# Patient Record
Sex: MALE | Race: WHITE | NOT HISPANIC OR LATINO | Employment: UNEMPLOYED | URBAN - METROPOLITAN AREA
[De-identification: names, ages, dates, MRNs, and addresses within clinical notes are randomized per-mention and may not be internally consistent; named-entity substitution may affect disease eponyms.]

---

## 2019-08-22 PROBLEM — J18.9 PNEUMONIA: Status: ACTIVE | Noted: 2019-08-22

## 2019-08-23 ENCOUNTER — HOSPITAL ENCOUNTER (INPATIENT)
Facility: OTHER | Age: 59
LOS: 1 days | Discharge: HOME | DRG: 193 | End: 2019-08-24
Attending: INTERNAL MEDICINE | Admitting: INTERNAL MEDICINE
Payer: COMMERCIAL

## 2019-08-23 DIAGNOSIS — J18.9 PNEUMONIA: ICD-10-CM

## 2019-08-23 PROBLEM — F17.200 SMOKER: Status: ACTIVE | Noted: 2019-08-23

## 2019-08-23 LAB
ANION GAP SERPL CALC-SCNC: 9 MMOL/L (ref 8–16)
ANION GAP SERPL CALC-SCNC: 9 MMOL/L (ref 8–16)
BASOPHILS # BLD AUTO: 0.05 K/UL (ref 0–0.2)
BASOPHILS # BLD AUTO: 0.06 K/UL (ref 0–0.2)
BASOPHILS NFR BLD: 0.3 % (ref 0–1.9)
BASOPHILS NFR BLD: 0.3 % (ref 0–1.9)
BILIRUB DIRECT SERPL-MCNC: 0.8 MG/DL (ref 0.1–0.3)
BUN SERPL-MCNC: 6 MG/DL (ref 6–20)
BUN SERPL-MCNC: 7 MG/DL (ref 6–20)
CALCIUM SERPL-MCNC: 8.6 MG/DL (ref 8.7–10.5)
CALCIUM SERPL-MCNC: 8.8 MG/DL (ref 8.7–10.5)
CHLORIDE SERPL-SCNC: 97 MMOL/L (ref 95–110)
CHLORIDE SERPL-SCNC: 98 MMOL/L (ref 95–110)
CO2 SERPL-SCNC: 23 MMOL/L (ref 23–29)
CO2 SERPL-SCNC: 25 MMOL/L (ref 23–29)
CREAT SERPL-MCNC: 0.7 MG/DL (ref 0.5–1.4)
CREAT SERPL-MCNC: 0.7 MG/DL (ref 0.5–1.4)
DIFFERENTIAL METHOD: ABNORMAL
DIFFERENTIAL METHOD: ABNORMAL
EOSINOPHIL # BLD AUTO: 0 K/UL (ref 0–0.5)
EOSINOPHIL # BLD AUTO: 0.1 K/UL (ref 0–0.5)
EOSINOPHIL NFR BLD: 0 % (ref 0–8)
EOSINOPHIL NFR BLD: 0.4 % (ref 0–8)
ERYTHROCYTE [DISTWIDTH] IN BLOOD BY AUTOMATED COUNT: 16.1 % (ref 11.5–14.5)
ERYTHROCYTE [DISTWIDTH] IN BLOOD BY AUTOMATED COUNT: 16.1 % (ref 11.5–14.5)
EST. GFR  (AFRICAN AMERICAN): >60 ML/MIN/1.73 M^2
EST. GFR  (AFRICAN AMERICAN): >60 ML/MIN/1.73 M^2
EST. GFR  (NON AFRICAN AMERICAN): >60 ML/MIN/1.73 M^2
EST. GFR  (NON AFRICAN AMERICAN): >60 ML/MIN/1.73 M^2
GLUCOSE SERPL-MCNC: 108 MG/DL (ref 70–110)
GLUCOSE SERPL-MCNC: 95 MG/DL (ref 70–110)
HCT VFR BLD AUTO: 31.4 % (ref 40–54)
HCT VFR BLD AUTO: 32.7 % (ref 40–54)
HGB BLD-MCNC: 10.3 G/DL (ref 14–18)
HGB BLD-MCNC: 10.5 G/DL (ref 14–18)
IMM GRANULOCYTES # BLD AUTO: 0.13 K/UL (ref 0–0.04)
IMM GRANULOCYTES # BLD AUTO: 0.14 K/UL (ref 0–0.04)
IMM GRANULOCYTES NFR BLD AUTO: 0.7 % (ref 0–0.5)
IMM GRANULOCYTES NFR BLD AUTO: 0.8 % (ref 0–0.5)
LYMPHOCYTES # BLD AUTO: 1.2 K/UL (ref 1–4.8)
LYMPHOCYTES # BLD AUTO: 1.3 K/UL (ref 1–4.8)
LYMPHOCYTES NFR BLD: 6.5 % (ref 18–48)
LYMPHOCYTES NFR BLD: 7.2 % (ref 18–48)
MAGNESIUM SERPL-MCNC: 1.6 MG/DL (ref 1.6–2.6)
MCH RBC QN AUTO: 24.9 PG (ref 27–31)
MCH RBC QN AUTO: 25 PG (ref 27–31)
MCHC RBC AUTO-ENTMCNC: 32.1 G/DL (ref 32–36)
MCHC RBC AUTO-ENTMCNC: 32.8 G/DL (ref 32–36)
MCV RBC AUTO: 76 FL (ref 82–98)
MCV RBC AUTO: 78 FL (ref 82–98)
MONOCYTES # BLD AUTO: 1.7 K/UL (ref 0.3–1)
MONOCYTES # BLD AUTO: 1.9 K/UL (ref 0.3–1)
MONOCYTES NFR BLD: 11.1 % (ref 4–15)
MONOCYTES NFR BLD: 8.9 % (ref 4–15)
NEUTROPHILS # BLD AUTO: 13.8 K/UL (ref 1.8–7.7)
NEUTROPHILS # BLD AUTO: 16 K/UL (ref 1.8–7.7)
NEUTROPHILS NFR BLD: 80.6 % (ref 38–73)
NEUTROPHILS NFR BLD: 83.2 % (ref 38–73)
NRBC BLD-RTO: 0 /100 WBC
NRBC BLD-RTO: 0 /100 WBC
PHOSPHATE SERPL-MCNC: 3.7 MG/DL (ref 2.7–4.5)
PLATELET # BLD AUTO: 440 K/UL (ref 150–350)
PLATELET # BLD AUTO: 468 K/UL (ref 150–350)
PMV BLD AUTO: 8.8 FL (ref 9.2–12.9)
PMV BLD AUTO: 9.6 FL (ref 9.2–12.9)
POTASSIUM SERPL-SCNC: 3.8 MMOL/L (ref 3.5–5.1)
POTASSIUM SERPL-SCNC: 4.7 MMOL/L (ref 3.5–5.1)
PROCALCITONIN SERPL IA-MCNC: 0.25 NG/ML
RBC # BLD AUTO: 4.12 M/UL (ref 4.6–6.2)
RBC # BLD AUTO: 4.22 M/UL (ref 4.6–6.2)
SODIUM SERPL-SCNC: 129 MMOL/L (ref 136–145)
SODIUM SERPL-SCNC: 132 MMOL/L (ref 136–145)
WBC # BLD AUTO: 17.15 K/UL (ref 3.9–12.7)
WBC # BLD AUTO: 19.26 K/UL (ref 3.9–12.7)

## 2019-08-23 PROCEDURE — 84145 PROCALCITONIN (PCT): CPT

## 2019-08-23 PROCEDURE — 99223 PR INITIAL HOSPITAL CARE,LEVL III: ICD-10-PCS | Mod: ,,, | Performed by: PHYSICIAN ASSISTANT

## 2019-08-23 PROCEDURE — 99223 1ST HOSP IP/OBS HIGH 75: CPT | Mod: ,,, | Performed by: PHYSICIAN ASSISTANT

## 2019-08-23 PROCEDURE — S4991 NICOTINE PATCH NONLEGEND: HCPCS | Performed by: INTERNAL MEDICINE

## 2019-08-23 PROCEDURE — 83735 ASSAY OF MAGNESIUM: CPT

## 2019-08-23 PROCEDURE — 11000001 HC ACUTE MED/SURG PRIVATE ROOM

## 2019-08-23 PROCEDURE — 85025 COMPLETE CBC W/AUTO DIFF WBC: CPT

## 2019-08-23 PROCEDURE — 25000003 PHARM REV CODE 250: Performed by: INTERNAL MEDICINE

## 2019-08-23 PROCEDURE — 94761 N-INVAS EAR/PLS OXIMETRY MLT: CPT

## 2019-08-23 PROCEDURE — 87205 SMEAR GRAM STAIN: CPT

## 2019-08-23 PROCEDURE — 84100 ASSAY OF PHOSPHORUS: CPT

## 2019-08-23 PROCEDURE — 36415 COLL VENOUS BLD VENIPUNCTURE: CPT

## 2019-08-23 PROCEDURE — 82248 BILIRUBIN DIRECT: CPT

## 2019-08-23 PROCEDURE — 87632 RESP VIRUS 6-11 TARGETS: CPT

## 2019-08-23 PROCEDURE — 80048 BASIC METABOLIC PNL TOTAL CA: CPT

## 2019-08-23 PROCEDURE — 87070 CULTURE OTHR SPECIMN AEROBIC: CPT

## 2019-08-23 PROCEDURE — 63600175 PHARM REV CODE 636 W HCPCS: Performed by: PHYSICIAN ASSISTANT

## 2019-08-23 RX ORDER — SODIUM CHLORIDE 0.9 % (FLUSH) 0.9 %
10 SYRINGE (ML) INJECTION
Status: DISCONTINUED | OUTPATIENT
Start: 2019-08-23 | End: 2019-08-24 | Stop reason: HOSPADM

## 2019-08-23 RX ORDER — ONDANSETRON 8 MG/1
8 TABLET, ORALLY DISINTEGRATING ORAL EVERY 8 HOURS PRN
Status: DISCONTINUED | OUTPATIENT
Start: 2019-08-23 | End: 2019-08-24 | Stop reason: HOSPADM

## 2019-08-23 RX ORDER — IBUPROFEN 200 MG
1 TABLET ORAL DAILY
Status: DISCONTINUED | OUTPATIENT
Start: 2019-08-23 | End: 2019-08-24 | Stop reason: HOSPADM

## 2019-08-23 RX ORDER — SODIUM CHLORIDE 9 MG/ML
INJECTION, SOLUTION INTRAVENOUS CONTINUOUS
Status: DISCONTINUED | OUTPATIENT
Start: 2019-08-23 | End: 2019-08-23

## 2019-08-23 RX ORDER — GUAIFENESIN 100 MG/5ML
200 SOLUTION ORAL EVERY 4 HOURS PRN
Status: DISCONTINUED | OUTPATIENT
Start: 2019-08-23 | End: 2019-08-24 | Stop reason: HOSPADM

## 2019-08-23 RX ORDER — ACETAMINOPHEN 325 MG/1
650 TABLET ORAL EVERY 4 HOURS PRN
Status: DISCONTINUED | OUTPATIENT
Start: 2019-08-23 | End: 2019-08-24 | Stop reason: HOSPADM

## 2019-08-23 RX ORDER — LEVOFLOXACIN 5 MG/ML
750 INJECTION, SOLUTION INTRAVENOUS
Status: DISCONTINUED | OUTPATIENT
Start: 2019-08-23 | End: 2019-08-24 | Stop reason: HOSPADM

## 2019-08-23 RX ORDER — BENZONATATE 100 MG/1
100 CAPSULE ORAL 3 TIMES DAILY PRN
Status: DISCONTINUED | OUTPATIENT
Start: 2019-08-23 | End: 2019-08-24 | Stop reason: HOSPADM

## 2019-08-23 RX ADMIN — NICOTINE 1 PATCH: 21 PATCH, EXTENDED RELEASE TRANSDERMAL at 09:08

## 2019-08-23 RX ADMIN — SODIUM CHLORIDE: 0.9 INJECTION, SOLUTION INTRAVENOUS at 03:08

## 2019-08-23 RX ADMIN — SODIUM CHLORIDE: 0.9 INJECTION, SOLUTION INTRAVENOUS at 04:08

## 2019-08-23 RX ADMIN — LEVOFLOXACIN 750 MG: 750 INJECTION, SOLUTION INTRAVENOUS at 10:08

## 2019-08-23 NOTE — H&P
"[facility] Hospital Medicine  History & Physical    Patient Name: Kashif Iverson  MRN: [id number]  Admission Date: 8/23/2019  Attending Physician: Jose Clement MD   Primary Care Provider: Raphael Santizo MD         Patient information was obtained from patient, past medical records and ER records.     Subjective:     Principal Problem:<principal problem not specified>    Chief Complaint: No chief complaint on file.       HPI: Mr. Kashif Iverson is a 58 y.o. male, with PMH of tobacco use, who presented to [facility] ED on 8/22/19 after his coworkers noticed he was confused and wandering while at work at the [employer] today. His son states "he has not been acting quite right." He has no complaints at present. His ED workkup showed a LLL post obstructive PNA, and a left mainstem bronchus pulmonary mass. As there was concern for cancer, the patient was transferred to [facility] for pulmonary consultation/bronchoscopy. He is admitted to inpatient status.     Past Medical History:   Diagnosis Date    Hypertension        History reviewed. No pertinent surgical history.    Review of patient's allergies indicates:  No Known Allergies    Current Facility-Administered Medications on File Prior to Encounter   Medication    [COMPLETED] iohexol (OMNIPAQUE 350) injection 100 mL    [COMPLETED] levoFLOXacin 750 mg/150 mL IVPB 750 mg    [COMPLETED] piperacillin-tazobactam 4.5 g in dextrose 5 % 100 mL IVPB (ready to mix system)    [COMPLETED] sodium chloride 0.9% bolus 1,000 mL    [COMPLETED] sodium chloride 0.9% bolus 1,000 mL    [COMPLETED] vancomycin 1500 mg in 0.9% sodium chloride 250 mL IVPB    [DISCONTINUED] vancomycin (VANCOCIN) 1,750 mg in dextrose 5 % 500 mL IVPB    [DISCONTINUED] vancomycin in dextrose 5 % 1 gram/250 mL IVPB 1,000 mg     No current outpatient medications on file prior to encounter.     Family History     None        Tobacco Use    Smoking status: Current Every Day Smoker     Packs/day: " 1.00     Years: 25.00     Pack years: 25.00     Types: Cigarettes    Smokeless tobacco: Never Used   Substance and Sexual Activity    Alcohol use: Not on file     Comment: quit 3 weks ago    Drug use: Never    Sexual activity: Yes     Partners: Female     Review of Systems   Unable to perform ROS: Mental status change     Objective:     Vital Signs (Most Recent):  Temp: 98.6 °F (37 °C) (08/23/19 0428)  Pulse: 90 (08/23/19 0428)  Resp: 20 (08/23/19 0428)  BP: 120/75 (08/23/19 0428)  SpO2: 97 % (08/23/19 0000) Vital Signs (24h Range):  Temp:  [98.3 °F (36.8 °C)-98.7 °F (37.1 °C)] 98.6 °F (37 °C)  Pulse:  [] 90  Resp:  [15-34] 20  SpO2:  [95 %-100 %] 97 %  BP: (109-150)/(70-90) 120/75     Weight: 79.7 kg (175 lb 11.3 oz)  Body mass index is 25.95 kg/m².    Physical Exam   Constitutional: Vital signs are normal. He appears well-developed and well-nourished.  Non-toxic appearance. He does not have a sickly appearance. He does not appear ill. No distress.   Thin, smells of urine.    HENT:   Head: Normocephalic and atraumatic.   Right Ear: External ear normal.   Left Ear: External ear normal.   Eyes: Pupils are equal, round, and reactive to light. Conjunctivae and EOM are normal. No scleral icterus.   Neck: Normal range of motion. Neck supple. No JVD present. No tracheal deviation present.   Cardiovascular: Normal rate, regular rhythm, normal heart sounds and intact distal pulses. Exam reveals no gallop and no friction rub.   No murmur heard.  Pulmonary/Chest: Effort normal and breath sounds normal. No stridor. No respiratory distress. He has no wheezes. He has no rales.   Decreased breath sounds in LLL.    Abdominal: Soft. Bowel sounds are normal. He exhibits no distension and no mass. There is no tenderness. There is no guarding.   Neurological: He is alert.   Oriented to person, and generally to situation. He is very slow in speaking, which son at bedside states is abnormal.    Skin: Skin is warm and dry. He  is not diaphoretic.   Psychiatric: He has a normal mood and affect. His behavior is normal. Judgment and thought content normal.   Nursing note and vitals reviewed.        CRANIAL NERVES     CN III, IV, VI   Pupils are equal, round, and reactive to light.  Extraocular motions are normal.        Significant Labs:   BMP:   Recent Labs   Lab 08/23/19 0048 08/23/19  0442    95   * 132*   K 3.8 4.7   CL 97 98   CO2 23 25   BUN 6 7   CREATININE 0.7 0.7   CALCIUM 8.6* 8.8   MG 1.6  --      CBC:   Recent Labs   Lab 08/22/19 1729 08/23/19 0048 08/23/19  0442   WBC 23.62* 19.26* 17.15*   HGB 10.4* 10.5* 10.3*   HCT 31.0* 32.7* 31.4*   * 440* 468*     CMP:   Recent Labs   Lab 08/22/19 1729 08/23/19 0048 08/23/19  0442   * 129* 132*   K 3.7 3.8 4.7   CL 88* 97 98   CO2 23 23 25   * 108 95   BUN 8 6 7   CREATININE 0.61 0.7 0.7   CALCIUM 8.6* 8.6* 8.8   PROT 7.1  --   --    ALBUMIN 3.2*  --   --    BILITOT 1.3*  --   --    ALKPHOS 113  --   --    AST 23  --   --    ALT 30  --   --    ANIONGAP 13 9 9   EGFRNONAA >60.0 >60 >60     Urine Culture: No results for input(s): LABURIN in the last 48 hours.  Urine Studies:   Recent Labs   Lab 08/22/19  1831   COLORU [place]*   APPEARANCEUA Clear   PHUR 6.0   SPECGRAV 1.010   PROTEINUA Negative   GLUCUA Negative   KETONESU Negative   BILIRUBINUA Negative   OCCULTUA Negative   NITRITE Negative   UROBILINOGEN >=8.0*   LEUKOCYTESUR Negative     All pertinent labs within the past 24 hours have been reviewed.    Significant Imaging: I have reviewed all pertinent imaging results/findings within the past 24 hours.          Assessment/Plan:     Smoker  - 40 years @ 1-1.5 PPD   - current active smoker      Pneumonia  - Mr. Kashif Iverson is admitted to inpatient status   - he has what appears to be a LLL postobstructive PNA    - antibiotics, PNA studies and anti-tussive ordered   - there is a Left mainstem bronchus obstructive mass   - pulmonology consulted    -  suspect bronchoscopy today   - PORT score: 98 (if this is not a neoplastic mass; 128 if neoplastic)       VTE Risk Mitigation (From admission, onward)        Ordered     IP VTE HIGH RISK PATIENT  Once      08/23/19 0105     Place sequential compression device  Until discontinued      08/23/19 0105     Reason for No Pharmacological VTE Prophylaxis  Once      08/23/19 0105             Inés Almonte PA-C  Department of Hospital Medicine   [facility]

## 2019-08-23 NOTE — HPI
"Mr. Kashif Iverson is a 58 y.o. male, with PMH of tobacco use, who presented to [facility] ED on 8/22/19 after his coworkers noticed he was confused and wandering while at work at the [employer] today. His son states "he has not been acting quite right." He has no complaints at present. His ED workkup showed a LLL post obstructive PNA, and a left mainstem bronchus pulmonary mass. As there was concern for cancer, the patient was transferred to [facility] for pulmonary consultation/bronchoscopy. He is admitted to inpatient status.   "

## 2019-08-23 NOTE — PLAN OF CARE
Appt with Dr Oumar Pacheco oncology recheduled for 9/9/19 1pm to ensure pathology reports complete

## 2019-08-23 NOTE — PROGRESS NOTES
Patient's brother Rolando Iverson here to see patient and asking for an update on his situation. Patient, Kashif Iverson, asked if it was okay to discuss his care with his brother, and he gave approval.

## 2019-08-23 NOTE — PLAN OF CARE
Problem: Adult Inpatient Plan of Care  Goal: Plan of Care Review  Outcome: Ongoing (interventions implemented as appropriate)  Patient stable. VSS. Last pulse 103. No pain reported during shift. Switched to regular diet. Family at bedside. Denies needs. Call bell in reach. Side rails up X2. Bed locked, lowered. Safety maintained.

## 2019-08-23 NOTE — SUBJECTIVE & OBJECTIVE
Past Medical History:   Diagnosis Date    Hypertension        History reviewed. No pertinent surgical history.    Review of patient's allergies indicates:  No Known Allergies    Current Facility-Administered Medications on File Prior to Encounter   Medication    [COMPLETED] iohexol (OMNIPAQUE 350) injection 100 mL    [COMPLETED] levoFLOXacin 750 mg/150 mL IVPB 750 mg    [COMPLETED] piperacillin-tazobactam 4.5 g in dextrose 5 % 100 mL IVPB (ready to mix system)    [COMPLETED] sodium chloride 0.9% bolus 1,000 mL    [COMPLETED] sodium chloride 0.9% bolus 1,000 mL    [COMPLETED] vancomycin 1500 mg in 0.9% sodium chloride 250 mL IVPB    [DISCONTINUED] vancomycin (VANCOCIN) 1,750 mg in dextrose 5 % 500 mL IVPB    [DISCONTINUED] vancomycin in dextrose 5 % 1 gram/250 mL IVPB 1,000 mg     No current outpatient medications on file prior to encounter.     Family History     None        Tobacco Use    Smoking status: Current Every Day Smoker     Packs/day: 1.00     Years: 25.00     Pack years: 25.00     Types: Cigarettes    Smokeless tobacco: Never Used   Substance and Sexual Activity    Alcohol use: Not on file     Comment: quit 3 weks ago    Drug use: Never    Sexual activity: Yes     Partners: Female     Review of Systems   Unable to perform ROS: Mental status change     Objective:     Vital Signs (Most Recent):  Temp: 98.6 °F (37 °C) (08/23/19 0428)  Pulse: 90 (08/23/19 0428)  Resp: 20 (08/23/19 0428)  BP: 120/75 (08/23/19 0428)  SpO2: 97 % (08/23/19 0000) Vital Signs (24h Range):  Temp:  [98.3 °F (36.8 °C)-98.7 °F (37.1 °C)] 98.6 °F (37 °C)  Pulse:  [] 90  Resp:  [15-34] 20  SpO2:  [95 %-100 %] 97 %  BP: (109-150)/(70-90) 120/75     Weight: 79.7 kg (175 lb 11.3 oz)  Body mass index is 25.95 kg/m².    Physical Exam   Constitutional: Vital signs are normal. He appears well-developed and well-nourished.  Non-toxic appearance. He does not have a sickly appearance. He does not appear ill. No distress.    Thin, smells of urine.    HENT:   Head: Normocephalic and atraumatic.   Right Ear: External ear normal.   Left Ear: External ear normal.   Eyes: Pupils are equal, round, and reactive to light. Conjunctivae and EOM are normal. No scleral icterus.   Neck: Normal range of motion. Neck supple. No JVD present. No tracheal deviation present.   Cardiovascular: Normal rate, regular rhythm, normal heart sounds and intact distal pulses. Exam reveals no gallop and no friction rub.   No murmur heard.  Pulmonary/Chest: Effort normal and breath sounds normal. No stridor. No respiratory distress. He has no wheezes. He has no rales.   Decreased breath sounds in LLL.    Abdominal: Soft. Bowel sounds are normal. He exhibits no distension and no mass. There is no tenderness. There is no guarding.   Neurological: He is alert.   Oriented to person, and generally to situation. He is very slow in speaking, which son at bedside states is abnormal.    Skin: Skin is warm and dry. He is not diaphoretic.   Psychiatric: He has a normal mood and affect. His behavior is normal. Judgment and thought content normal.   Nursing note and vitals reviewed.        CRANIAL NERVES     CN III, IV, VI   Pupils are equal, round, and reactive to light.  Extraocular motions are normal.        Significant Labs:   BMP:   Recent Labs   Lab 08/23/19 0048 08/23/19  0442    95   * 132*   K 3.8 4.7   CL 97 98   CO2 23 25   BUN 6 7   CREATININE 0.7 0.7   CALCIUM 8.6* 8.8   MG 1.6  --      CBC:   Recent Labs   Lab 08/22/19 1729 08/23/19 0048 08/23/19 0442   WBC 23.62* 19.26* 17.15*   HGB 10.4* 10.5* 10.3*   HCT 31.0* 32.7* 31.4*   * 440* 468*     CMP:   Recent Labs   Lab 08/22/19 1729 08/23/19 0048 08/23/19  0442   * 129* 132*   K 3.7 3.8 4.7   CL 88* 97 98   CO2 23 23 25   * 108 95   BUN 8 6 7   CREATININE 0.61 0.7 0.7   CALCIUM 8.6* 8.6* 8.8   PROT 7.1  --   --    ALBUMIN 3.2*  --   --    BILITOT 1.3*  --   --    ALKPHOS 113   --   --    AST 23  --   --    ALT 30  --   --    ANIONGAP 13 9 9   EGFRNONAA >60.0 >60 >60     Urine Culture: No results for input(s): LABURIN in the last 48 hours.  Urine Studies:   Recent Labs   Lab 08/22/19  1831   COLORU [place]*   APPEARANCEUA Clear   PHUR 6.0   SPECGRAV 1.010   PROTEINUA Negative   GLUCUA Negative   KETONESU Negative   BILIRUBINUA Negative   OCCULTUA Negative   NITRITE Negative   UROBILINOGEN >=8.0*   LEUKOCYTESUR Negative     All pertinent labs within the past 24 hours have been reviewed.    Significant Imaging: I have reviewed all pertinent imaging results/findings within the past 24 hours.

## 2019-08-23 NOTE — CONSULTS
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 Pt son states that father has lost 30-60lbs in past 3 months, has decreased appetite, increased urination, shaking of hands when picking up heavy objects, tooth loss. Pt states that he developed URI/LRI symptoms 2 months ago and wife gave amoxacillin which improved cough. He denies SOB, sputum production, cough, fever, chills, n/v/d/c, abdominal pain, blurred vision, loss of hearing, parathesias, dysphagia. Son denies significant alteration of physical appearance except for weight loss.    Pt found to have large OPAL mass with compression of left main stem bronchus and concern for post obstructive PNA. He had hyponatremia to 124 and leukocytosis 124. According to EMR at OSH patient was tachycardic and tachypnic without distress.     Past Medical History:   Diagnosis Date    Hypertension      History reviewed. No pertinent surgical history.  Social History:   Social History     Socioeconomic History    Marital status:      Spouse name: Not on file    Number of children: Not on file    Years of education: Not on file    Highest education level: Not on file   Occupational History    Not on file   Social Needs    Financial resource strain: Not on file    Food insecurity:     Worry: Not on file     Inability: Not on file    Transportation needs:     Medical: Not on file     Non-medical: Not on file   Tobacco Use    Smoking status: Current Every Day Smoker     Packs/day: 1.00     Years: 25.00     Pack years: 25.00     Types: Cigarettes    Smokeless tobacco: Never Used   Substance and Sexual Activity    Alcohol use: Not on file     Comment: quit 3 weks ago    Drug use: Never    Sexual activity: Yes     Partners: Female   Lifestyle     Physical activity:     Days per week: Not on file     Minutes per session: Not on file    Stress: Not on file   Relationships    Social connections:     Talks on phone: Not on file     Gets together: Not on file     Attends Hindu service: Not on file     Active member of club or organization: Not on file     Attends meetings of clubs or organizations: Not on file     Relationship status: Not on file   Other Topics Concern    Not on file   Social History Narrative    Not on file     History reviewed. No pertinent family history.  Drug Allergies:   Review of patient's allergies indicates:  No Known Allergies  Current Infusions:   sodium chloride 0.9% 100 mL/hr at 08/23/19 0425     Scheduled Medications:     levoFLOXacin  750 mg Intravenous Q24H    nicotine  1 patch Transdermal Daily     PRN Medications:   acetaminophen, benzonatate, guaifenesin 100 mg/5 ml, methyl salicylate-menthol, ondansetron, pneumoc 13-martin conj-dip cr(PF), sodium chloride 0.9%    Review of Systems:   A comprehensive 12-point review of systems was performed, and is negative except for those items mentioned above in the HPI section of this note.     Vital Signs:    Vitals:    08/23/19 1220   BP: 114/65   Pulse: 89   Resp: 18   Temp: 97.6 °F (36.4 °C)       Fluid Balance:     Intake/Output Summary (Last 24 hours) at 8/23/2019 1433  Last data filed at 8/23/2019 0855  Gross per 24 hour   Intake 406.56 ml   Output 1125 ml   Net -718.44 ml       Physical Exam:   General: Lying down comfortably, NAD  HEENT: NC/AT, PERRL, EOMI, no scleral icterus, moist mucous membranes, no pharyngeal erythema or exudates, normal dentition.  Neck: Supple without JVD, trachea midline, no thyromegaly  Cardiac: S1S2 auscultated, RRR, no murmurs/rubs/gallops  Resp: Normal inspection. Symmetric chest rise. Normal palpation and percussion. Auscultation clear bilaterally with no increased work of breathing and breath sounds R>L. Good inspiratory effort. No  wheezes/rhonchi/crackles.  Abd: Soft, NT/ND, +BS, no HSM, no palpable masses, no rebound tenderness.  Lymphatic: No palpable supraclavicular/cervical/axillary LAD.   Ext: No edema, no cyanosis, no clubbing, 2+ pulses present, brisk capillary refill present.  Skin: Warm and dry, no rashes, no lesions, no jaundice  Neuro: AAOx3, CN II-XII grossly intact, no focal deficits, good sensation bilaterally, grossly normal strength and tone.  Psych: Appropriate mood and affect, cooperative & interactive    Laboratory Studies:   No results for input(s): PH, PCO2, PO2, HCO3, POCSATURATED, BE in the last 24 hours.  Recent Labs   Lab 08/23/19  0442   WBC 17.15*   RBC 4.12*   HGB 10.3*   HCT 31.4*   *   MCV 76*   MCH 25.0*   MCHC 32.8     Recent Labs   Lab 08/23/19  0048 08/23/19  0442   * 132*   K 3.8 4.7   CL 97 98   CO2 23 25   BUN 6 7   CREATININE 0.7 0.7   MG 1.6  --        Microbiology Data:   Microbiology Results (last 7 days)     Procedure Component Value Units Date/Time    Culture, Respiratory with Gram Stain [[id number]] Collected:  08/23/19 0900    Order Status:  Sent Specimen:  Respiratory from Sputum Updated:  08/23/19 0914    Respiratory Viral Panel by PCR [facility]; Nasal Swab [[id number]] Collected:  08/23/19 0054    Order Status:  Sent Specimen:  Respiratory Updated:  08/23/19 0104        Summary of Chest Imaging Personally Reviewed:   OPAL mass with compression of left main bronchus with possible air trapping and/or necrosis of lung. Mediastinal shift to the left.     XR chest: Almost complete white out of Left lung fields.     Impression & Recommendations  Pt who presented with AMS to outside medical facility found to have signs of sepsis, hyponatremia sent to [facility] for further evaluation and treatment. Pt CXR showed near complete white out of left lung and follow up CT showed left upper lobe with compression of left main bronchus and peripheral compressed lung with possible consolidation and air  trapping.     Pulm:   OPAL mass concerning for malignancy, CAP 2/2 post obstructive PNA  - Will plan for outpatient bronchoscopy at [facility] if patient discharged over weekend; discussed preference with patients wife  - If patient continues to remain inpatient will consider inpatient bronch, but should not hold up discharge  - Agree with levoquin for CAP 2/2 post obstructive PNA; no MRSA or Pseudomonal risk factors noted.  - Would treat for 7 days total     DVT ppx: would consider chemical anticoagulation in patient with malignancy; agree with SCDs in meantime  GI ppx:N/A    Code status: Full    Thank you for involving us in the care of this patient. We will continue to follow along peripherally. If not discharged Monday will discuss planning for bronch dependent upon discharge planning.     Rhys Schuster MD  [facility] PCCM Fellow, PGY 4  [facility]    Staff Addendum  59 yo M with 40pkyr smoking history admitted with AMS, hyponatremia with CT imaging performed demonstrating mass encompassing left mainstem bronchus and postobstructive PNA. Patient reports 30lb weight loss over ~3 months. Sodium improving (124 to 132 today) - mentation improved per son at bedside. Patient will benefit from bronchoscopy for diagnostic evaluation as suspect should be able to obtain endobronchial biopsies. Clinical picture improved amd suspect patient can be discharged over the weekend - we can arrange for outpatient bronchoscopy at [facility] next week per patient's wife preference. Agree with Elvira.    Alida Wood MD

## 2019-08-23 NOTE — ASSESSMENT & PLAN NOTE
- Mr. Kashif Iverson is admitted to inpatient status   - he has what appears to be a LLL postobstructive PNA    - antibiotics, PNA studies and anti-tussive ordered   - there is a Left mainstem bronchus obstructive mass   - pulmonology consulted    - suspect bronchoscopy today   - PORT score: 98 (if this is not a neoplastic mass; 128 if neoplastic)

## 2019-08-23 NOTE — PLAN OF CARE
Pt was asleep and sonRolando answered most discharge assessment questions.  No sure of pharmacy or pt's MD.  Pt lives with spouse and sons, 36 & 17.  Pt doesn't have a POA or living will.  No needs identified at this time.     08/23/19 1038   Discharge Assessment   Assessment Type Discharge Planning Assessment   Confirmed/corrected address and phone number on facesheet? Yes   Assessment information obtained from? Caregiver   Communicated expected length of stay with patient/caregiver no   Prior to hospitilization cognitive status: Alert/Oriented   Prior to hospitalization functional status: Independent   Current cognitive status: Unable to Assess   Current Functional Status: Needs Assistance   Lives With spouse;child(tulio), adult;child(tulio), dependent   Able to Return to Prior Arrangements yes   Is patient able to care for self after discharge? Unable to determine at this time (comments)   Who are your caregiver(s) and their phone number(s)?   (wife and sons)   Readmission Within the Last 30 Days no previous admission in last 30 days   Patient currently being followed by outpatient case management? No   Patient currently receives any other outside agency services? No   Equipment Currently Used at Home none   Do you have any problems affording any of your prescribed medications? No   Is the patient taking medications as prescribed? yes   Does the patient have transportation home? Yes   Transportation Anticipated family or friend will provide   Does the patient receive services at the [facility]madin Clinic? No   Discharge Plan A Home   DME Needed Upon Discharge  none   Patient/Family in Agreement with Plan yes

## 2019-08-23 NOTE — PLAN OF CARE
Problem: Adult Inpatient Plan of Care  Goal: Plan of Care Review  Outcome: Ongoing (interventions implemented as appropriate)  [facility]; reported to have been seen d/t AMS; pt now AAOx4; NAD noted; VSS throughout shift; afebrile; sats maintained on RA; O2 on standby; POC reviewed with pt/family; verbalized understanding; continues with labs/vitals monitoring, IVF, IV abx and resp support; assessment per flowsheet; meds administered per MAR; safety precautions maintained; 0 falls, injury or acute events this shift; tellesitter in room for safety; 0 needs voiced; WCTM    Pending: AM labs, sputum sample for culture, viral panel results

## 2019-08-23 NOTE — PLAN OF CARE
Oncology appt rescheduled to better location for patient - new appt 9/10/19 11am with Dr Cricket BEGUM updated - patient/wife updated

## 2019-08-24 VITALS
TEMPERATURE: 98 F | RESPIRATION RATE: 18 BRPM | HEART RATE: 85 BPM | OXYGEN SATURATION: 99 % | DIASTOLIC BLOOD PRESSURE: 68 MMHG | SYSTOLIC BLOOD PRESSURE: 110 MMHG | HEIGHT: 69 IN | WEIGHT: 175.69 LBS | BODY MASS INDEX: 26.02 KG/M2

## 2019-08-24 PROBLEM — E87.1 HYPONATREMIA: Status: ACTIVE | Noted: 2019-08-24

## 2019-08-24 PROBLEM — E87.1 HYPONATREMIA: Status: RESOLVED | Noted: 2019-08-24 | Resolved: 2019-08-24

## 2019-08-24 PROBLEM — G93.41 ENCEPHALOPATHY, METABOLIC: Status: RESOLVED | Noted: 2019-08-24 | Resolved: 2019-08-24

## 2019-08-24 PROBLEM — R91.8 LUNG MASS: Status: ACTIVE | Noted: 2019-08-24

## 2019-08-24 PROBLEM — G93.41 ENCEPHALOPATHY, METABOLIC: Status: ACTIVE | Noted: 2019-08-24

## 2019-08-24 PROBLEM — D63.8 ANEMIA, CHRONIC DISEASE: Status: ACTIVE | Noted: 2019-08-24

## 2019-08-24 LAB
ANION GAP SERPL CALC-SCNC: 9 MMOL/L (ref 8–16)
BASOPHILS # BLD AUTO: 0.06 K/UL (ref 0–0.2)
BASOPHILS NFR BLD: 0.4 % (ref 0–1.9)
BUN SERPL-MCNC: 7 MG/DL (ref 6–20)
CALCIUM SERPL-MCNC: 8.2 MG/DL (ref 8.7–10.5)
CHLORIDE SERPL-SCNC: 99 MMOL/L (ref 95–110)
CO2 SERPL-SCNC: 26 MMOL/L (ref 23–29)
CREAT SERPL-MCNC: 0.7 MG/DL (ref 0.5–1.4)
DIFFERENTIAL METHOD: ABNORMAL
EOSINOPHIL # BLD AUTO: 0 K/UL (ref 0–0.5)
EOSINOPHIL NFR BLD: 0.2 % (ref 0–8)
ERYTHROCYTE [DISTWIDTH] IN BLOOD BY AUTOMATED COUNT: 16 % (ref 11.5–14.5)
EST. GFR  (AFRICAN AMERICAN): >60 ML/MIN/1.73 M^2
EST. GFR  (NON AFRICAN AMERICAN): >60 ML/MIN/1.73 M^2
FERRITIN SERPL-MCNC: 754 NG/ML (ref 20–300)
GLUCOSE SERPL-MCNC: 102 MG/DL (ref 70–110)
HCT VFR BLD AUTO: 31.2 % (ref 40–54)
HGB BLD-MCNC: 9.8 G/DL (ref 14–18)
IMM GRANULOCYTES # BLD AUTO: 0.16 K/UL (ref 0–0.04)
IMM GRANULOCYTES NFR BLD AUTO: 1 % (ref 0–0.5)
IRON SERPL-MCNC: 14 UG/DL (ref 45–160)
LYMPHOCYTES # BLD AUTO: 1.6 K/UL (ref 1–4.8)
LYMPHOCYTES NFR BLD: 9.7 % (ref 18–48)
MCH RBC QN AUTO: 24.7 PG (ref 27–31)
MCHC RBC AUTO-ENTMCNC: 31.4 G/DL (ref 32–36)
MCV RBC AUTO: 79 FL (ref 82–98)
MONOCYTES # BLD AUTO: 1.8 K/UL (ref 0.3–1)
MONOCYTES NFR BLD: 11 % (ref 4–15)
NEUTROPHILS # BLD AUTO: 12.7 K/UL (ref 1.8–7.7)
NEUTROPHILS NFR BLD: 77.7 % (ref 38–73)
NRBC BLD-RTO: 0 /100 WBC
PLATELET # BLD AUTO: 439 K/UL (ref 150–350)
PMV BLD AUTO: 9.3 FL (ref 9.2–12.9)
POTASSIUM SERPL-SCNC: 4.1 MMOL/L (ref 3.5–5.1)
RBC # BLD AUTO: 3.96 M/UL (ref 4.6–6.2)
SATURATED IRON: 8 % (ref 20–50)
SODIUM SERPL-SCNC: 134 MMOL/L (ref 136–145)
TOTAL IRON BINDING CAPACITY: 186 UG/DL (ref 250–450)
TRANSFERRIN SERPL-MCNC: 126 MG/DL (ref 200–375)
VIT B12 SERPL-MCNC: 371 PG/ML (ref 210–950)
WBC # BLD AUTO: 16.34 K/UL (ref 3.9–12.7)

## 2019-08-24 PROCEDURE — 99238 HOSP IP/OBS DSCHRG MGMT 30/<: CPT | Mod: ,,, | Performed by: INTERNAL MEDICINE

## 2019-08-24 PROCEDURE — 85025 COMPLETE CBC W/AUTO DIFF WBC: CPT

## 2019-08-24 PROCEDURE — 99238 PR HOSPITAL DISCHARGE DAY,<30 MIN: ICD-10-PCS | Mod: ,,, | Performed by: INTERNAL MEDICINE

## 2019-08-24 PROCEDURE — 80048 BASIC METABOLIC PNL TOTAL CA: CPT

## 2019-08-24 PROCEDURE — 82747 ASSAY OF FOLIC ACID RBC: CPT

## 2019-08-24 PROCEDURE — 36415 COLL VENOUS BLD VENIPUNCTURE: CPT

## 2019-08-24 PROCEDURE — 83540 ASSAY OF IRON: CPT

## 2019-08-24 PROCEDURE — 94761 N-INVAS EAR/PLS OXIMETRY MLT: CPT

## 2019-08-24 PROCEDURE — 82607 VITAMIN B-12: CPT

## 2019-08-24 PROCEDURE — 25000003 PHARM REV CODE 250: Performed by: INTERNAL MEDICINE

## 2019-08-24 PROCEDURE — 90670 PCV13 VACCINE IM: CPT | Performed by: INTERNAL MEDICINE

## 2019-08-24 PROCEDURE — 82728 ASSAY OF FERRITIN: CPT

## 2019-08-24 PROCEDURE — 25000003 PHARM REV CODE 250: Performed by: PHYSICIAN ASSISTANT

## 2019-08-24 PROCEDURE — S4991 NICOTINE PATCH NONLEGEND: HCPCS | Performed by: INTERNAL MEDICINE

## 2019-08-24 PROCEDURE — 90471 IMMUNIZATION ADMIN: CPT | Performed by: INTERNAL MEDICINE

## 2019-08-24 PROCEDURE — 63600175 PHARM REV CODE 636 W HCPCS: Performed by: INTERNAL MEDICINE

## 2019-08-24 RX ORDER — LEVOFLOXACIN 750 MG/1
750 TABLET ORAL DAILY
Qty: 10 TABLET | Refills: 0 | Status: SHIPPED | OUTPATIENT
Start: 2019-08-24 | End: 2019-09-03

## 2019-08-24 RX ORDER — HYDROCODONE POLISTIREX AND CHLORPHENIRAMINE POLISTIREX 10; 8 MG/5ML; MG/5ML
5 SUSPENSION, EXTENDED RELEASE ORAL EVERY 12 HOURS PRN
Qty: 120 ML | Refills: 0 | Status: SHIPPED | OUTPATIENT
Start: 2019-08-24 | End: 2020-01-23 | Stop reason: ALTCHOICE

## 2019-08-24 RX ADMIN — ONDANSETRON 8 MG: 8 TABLET, ORALLY DISINTEGRATING ORAL at 09:08

## 2019-08-24 RX ADMIN — PNEUMOCOCCAL 13-VALENT CONJUGATE VACCINE 0.5 ML: 2.2; 2.2; 2.2; 2.2; 2.2; 4.4; 2.2; 2.2; 2.2; 2.2; 2.2; 2.2; 2.2 INJECTION, SUSPENSION INTRAMUSCULAR at 03:08

## 2019-08-24 RX ADMIN — ACETAMINOPHEN 650 MG: 325 TABLET, FILM COATED ORAL at 09:08

## 2019-08-24 RX ADMIN — BENZONATATE 100 MG: 100 CAPSULE ORAL at 09:08

## 2019-08-24 RX ADMIN — NICOTINE 1 PATCH: 21 PATCH, EXTENDED RELEASE TRANSDERMAL at 09:08

## 2019-08-24 NOTE — DISCHARGE INSTRUCTIONS
If you have any problems filling your prescriptions or other questions after discharge call  Dr Clement [phone]        Thank you for choosing [facility] as your Health Care Provider. [facility] strives to provide the best healthcare available to you. In the next few days you may receive a Survey, either by mail or email,  asking you to rate our care that was provided to you during your stay.  Please return the survey to us, as your feedback is important. We aim to meet your expectations of safe, quality health care.    From your [facility] Care Team.

## 2019-08-24 NOTE — PLAN OF CARE
08/24/19 1039   Final Note   Assessment Type Final Discharge Note   Anticipated Discharge Disposition Home   Hospital Follow Up  Appt(s) scheduled? Yes   Discharge plans and expectations educations in teach back method with documentation complete? Yes   Right Care Referral Info   Post Acute Recommendation Other   Referral Type oncology clinic, initial appt   Facility Name [facility]

## 2019-08-24 NOTE — PROGRESS NOTES
"[facility] Hospital Medicine  Progress Note    Patient Name: Kashif Iverson  MRN: [id number]  Patient Class: IP- Inpatient   Admission Date: 8/23/2019  Length of Stay: 1 days  Attending Physician: Jose Clement MD  Primary Care Provider: Raphael Santizo MD        Subjective:     Principal Problem:Pneumonia        HPI:  Mr. Kashif Iverson is a 58 y.o. male, with PMH of tobacco use, who presented to [facility] ED on 8/22/19 after his coworkers noticed he was confused and wandering while at work at the [employer] today. His son states "he has not been acting quite right." He has no complaints at present. His ED workkup showed a LLL post obstructive PNA, and a left mainstem bronchus pulmonary mass. As there was concern for cancer, the patient was transferred to [facility] for pulmonary consultation/bronchoscopy. He is admitted to inpatient status.     Overview/Hospital Course:  No notes on file    Interval History:   Seems at baseline  Plans discussed with family  Will have outpatient bronch early this week and follow up with onc 1 week later.    Denies cp/sob      Objective:     Vital Signs (Most Recent):  Temp: 98.4 °F (36.9 °C) (08/24/19 0807)  Pulse: 85 (08/24/19 0849)  Resp: 18 (08/24/19 0849)  BP: 110/68 (08/24/19 0807)  SpO2: 99 % (08/24/19 0849) Vital Signs (24h Range):  Temp:  [97.6 °F (36.4 °C)-98.4 °F (36.9 °C)] 98.4 °F (36.9 °C)  Pulse:  [] 85  Resp:  [14-20] 18  SpO2:  [96 %-99 %] 99 %  BP: ()/(41-71) 110/68     Weight: 79.7 kg (175 lb 11.3 oz)  Body mass index is 25.95 kg/m².    Intake/Output Summary (Last 24 hours) at 8/24/2019 0930  Last data filed at 8/24/2019 0616  Gross per 24 hour   Intake 961.07 ml   Output 1075 ml   Net -113.93 ml      Physical Exam   Constitutional: He is oriented to person, place, and time. Vital signs are normal. He appears well-developed and well-nourished.  Non-toxic appearance. He does not have a sickly appearance. He does not appear ill. No distress. "   HENT:   Head: Normocephalic and atraumatic.   Right Ear: External ear normal.   Left Ear: External ear normal.   Eyes: Pupils are equal, round, and reactive to light. Conjunctivae and EOM are normal. No scleral icterus.   Neck: Normal range of motion. Neck supple. No JVD present. No tracheal deviation present.   Cardiovascular: Normal rate, regular rhythm, normal heart sounds and intact distal pulses. Exam reveals no gallop and no friction rub.   No murmur heard.  Pulmonary/Chest: Effort normal and breath sounds normal. No stridor. No respiratory distress. He has no wheezes. He has no rales.   Decreased breath sounds in LLL.    Abdominal: Soft. Bowel sounds are normal. He exhibits no distension and no mass. There is no tenderness. There is no guarding.   Neurological: He is alert and oriented to person, place, and time.   Skin: Skin is warm and dry. He is not diaphoretic.   Psychiatric: He has a normal mood and affect. His behavior is normal. Judgment and thought content normal.   Nursing note and vitals reviewed.      Significant Labs:   BMP:   Recent Labs   Lab 08/23/19  0048  08/24/19  0316      < > 102   *   < > 134*   K 3.8   < > 4.1   CL 97   < > 99   CO2 23   < > 26   BUN 6   < > 7   CREATININE 0.7   < > 0.7   CALCIUM 8.6*   < > 8.2*   MG 1.6  --   --     < > = values in this interval not displayed.     CBC:   Recent Labs   Lab 08/23/19  0048 08/23/19  0442 08/24/19  0315   WBC 19.26* 17.15* 16.34*   HGB 10.5* 10.3* 9.8*   HCT 32.7* 31.4* 31.2*   * 468* 439*       Significant Imaging: I have reviewed all pertinent imaging results/findings within the past 24 hours.      Assessment/Plan:      * Pneumonia   LLL postobstructive PNA  Continue course of levaquin      Encephalopathy, metabolic  Related to hyponatremia  Now appears at baseline      Hyponatremia  Na corrected       Anemia, chronic disease  Most likley related to suspected malignancy  Will check iron studies, b12, folate  levels  Supportive transfusions as needed for hb<8      Lung mass  Encases left main bronchus.  Plan is for outpatient bronch and bx      Smoker  - 40 years @ 1-1.5 PPD   - current active smoker  -cessation stressed        VTE Risk Mitigation (From admission, onward)        Ordered     IP VTE HIGH RISK PATIENT  Once      08/23/19 0105     Place sequential compression device  Until discontinued      08/23/19 0105     Reason for No Pharmacological VTE Prophylaxis  Once      08/23/19 0105                Jose Clement MD  Department of Hospital Medicine   [facility]

## 2019-08-24 NOTE — PLAN OF CARE
Problem: Adult Inpatient Plan of Care  Goal: Patient-Specific Goal (Individualization)  Outcome: Ongoing (interventions implemented as appropriate)  Pt free of falls/trauma/injuries this shift.VSS. NS infusion discontinued, IV abx continued. Medication administered as perscribed. PRN icy hot administered for lower back pain, Patient tolerating POC well. Pt verbalizes understanding of care. Pt denies any chest pain, SOB, or any other discomforts at this time. Will continue to monitor.

## 2019-08-24 NOTE — NURSING
Patient educated on discharge instructions and verbalized understanding.  All questions answered to patient's satisfaction.  IV removed without complication.  Family at bedside.  Patient to be transported off unit via wheelchair.

## 2019-08-24 NOTE — ASSESSMENT & PLAN NOTE
Most likley related to suspected malignancy  Will check iron studies, b12, folate levels  Supportive transfusions as needed for hb<8

## 2019-08-24 NOTE — SUBJECTIVE & OBJECTIVE
Interval History:   Seems at baseline  Plans discussed with family  Will have outpatient bronch early this week and follow up with onc 1 week later.    Denies cp/sob      Objective:     Vital Signs (Most Recent):  Temp: 98.4 °F (36.9 °C) (08/24/19 0807)  Pulse: 85 (08/24/19 0849)  Resp: 18 (08/24/19 0849)  BP: 110/68 (08/24/19 0807)  SpO2: 99 % (08/24/19 0849) Vital Signs (24h Range):  Temp:  [97.6 °F (36.4 °C)-98.4 °F (36.9 °C)] 98.4 °F (36.9 °C)  Pulse:  [] 85  Resp:  [14-20] 18  SpO2:  [96 %-99 %] 99 %  BP: ()/(41-71) 110/68     Weight: 79.7 kg (175 lb 11.3 oz)  Body mass index is 25.95 kg/m².    Intake/Output Summary (Last 24 hours) at 8/24/2019 0930  Last data filed at 8/24/2019 0616  Gross per 24 hour   Intake 961.07 ml   Output 1075 ml   Net -113.93 ml      Physical Exam   Constitutional: He is oriented to person, place, and time. Vital signs are normal. He appears well-developed and well-nourished.  Non-toxic appearance. He does not have a sickly appearance. He does not appear ill. No distress.   HENT:   Head: Normocephalic and atraumatic.   Right Ear: External ear normal.   Left Ear: External ear normal.   Eyes: Pupils are equal, round, and reactive to light. Conjunctivae and EOM are normal. No scleral icterus.   Neck: Normal range of motion. Neck supple. No JVD present. No tracheal deviation present.   Cardiovascular: Normal rate, regular rhythm, normal heart sounds and intact distal pulses. Exam reveals no gallop and no friction rub.   No murmur heard.  Pulmonary/Chest: Effort normal and breath sounds normal. No stridor. No respiratory distress. He has no wheezes. He has no rales.   Decreased breath sounds in LLL.    Abdominal: Soft. Bowel sounds are normal. He exhibits no distension and no mass. There is no tenderness. There is no guarding.   Neurological: He is alert and oriented to person, place, and time.   Skin: Skin is warm and dry. He is not diaphoretic.   Psychiatric: He has a normal  mood and affect. His behavior is normal. Judgment and thought content normal.   Nursing note and vitals reviewed.      Significant Labs:   BMP:   Recent Labs   Lab 08/23/19  0048  08/24/19  0316      < > 102   *   < > 134*   K 3.8   < > 4.1   CL 97   < > 99   CO2 23   < > 26   BUN 6   < > 7   CREATININE 0.7   < > 0.7   CALCIUM 8.6*   < > 8.2*   MG 1.6  --   --     < > = values in this interval not displayed.     CBC:   Recent Labs   Lab 08/23/19 0048 08/23/19  0442 08/24/19 0315   WBC 19.26* 17.15* 16.34*   HGB 10.5* 10.3* 9.8*   HCT 32.7* 31.4* 31.2*   * 468* 439*       Significant Imaging: I have reviewed all pertinent imaging results/findings within the past 24 hours.

## 2019-08-25 NOTE — DISCHARGE SUMMARY
"[facility] Hospital Medicine  Discharge Summary      Patient Name: Kashif Iverson  MRN: [id number]  Admission Date: 8/23/2019  Hospital Length of Stay: 1 days  Discharge Date and Time: 8/24/2019  5:06 PM  Attending Physician: No att. providers found   Discharging Provider: Jose Ferrell MD  Primary Care Provider: Raphael Santizo MD      HPI:   Mr. Kashif Iverson is a 58 y.o. male, with PMH of tobacco use, who presented to [facility] ED on 8/22/19 after his coworkers noticed he was confused and wandering while at work at the [employer] today. His son states "he has not been acting quite right." He has no complaints at present. His ED workkup showed a LLL post obstructive PNA, and a left mainstem bronchus pulmonary mass. As there was concern for cancer, the patient was transferred [facility]/bronchoscopy. He is admitted to inpatient status.     * No surgery found *      Hospital Course:   Transferred from [facility] 2[facility] with confusion and lung mass with post-obstructive PNA.   He was hyponatremic with a  which likely was the cause of his encephalopathy as with correction his mental status improved back to baseline.  His noncontrast head CT did not show any obvious mass but there were 2 remote infarcts noted.    He was treated with levaquin for post-obstructive PNA.    Seen by pulmonary and plan is for outpatient bronch and biopsy with heme/onc follow up 1 week after.    He also seems to have a concomitant Fe deficiency anemia.    He is still smoking and smoking cessation was strongly encouraged.    Consults:   Consults (From admission, onward)        Status Ordering Provider     Inpatient consult to Pulmonology  Once     Provider:  Alida Wood MD    Completed JOSE FERRELL.          No new Assessment & Plan notes have been filed under this hospital service since the last note was generated.  Service: Hospital Medicine    Final Active Diagnoses:    Diagnosis Date Noted POA "    PRINCIPAL PROBLEM:  Pneumonia [J18.9] 08/22/2019 Yes    Lung mass [R91.8] 08/24/2019 Yes    Anemia, chronic disease [D63.8] 08/24/2019 Yes    Smoker [F17.200] 08/23/2019 Yes      Problems Resolved During this Admission:    Diagnosis Date Noted Date Resolved POA    Hyponatremia [E87.1] 08/24/2019 08/24/2019 Yes    Encephalopathy, metabolic [G93.41] 08/24/2019 08/24/2019 Yes       Discharged Condition: good    Disposition: Home or Self Care    Follow Up:  Follow-up Information     Cricket Interiano MD On 9/10/2019.    Specialties:  Hematology and Oncology, Hematology, Oncology  Why:  at 11am to establish oncology care   Contact information:  [address]  [address]  [place] [id number] [phone]             Raphael Santizo MD.    Specialty:  Internal Medicine  Contact information:  [address]  [address]  [place] [address]  [phone]             Jun Bal MD.    Specialty:  Internal Medicine  Contact information:  [address]  [place] [address]  [phone]                 Patient Instructions:      Diet Adult Regular     Activity as tolerated       Significant Diagnostic Studies:     Ct:Large mass encasing the left mainstem bronchus and occluding left chest with resultant postobstructive pneumonia.  Findings concerning for neoplasm.   Small to moderate left pleural fluid.    Pending Diagnostic Studies:     Procedure Component Value Units Date/Time    Folate RBC [[id number]] Collected:  08/24/19 0835    Order Status:  Sent Lab Status:  In process Updated:  08/24/19 1125    Specimen:  Blood          Medications:  Reconciled Home Medications:      Medication List      START taking these medications    hydrocodone-chlorpheniramine 10-8 mg/5 mL suspension  Commonly known as:  TUSSIONEX  Take 5 mLs by mouth every 12 (twelve) hours as needed for Cough.     levoFLOXacin 750 MG tablet  Commonly known as:  LEVAQUIN  Take 1 tablet (750 mg total) by mouth once daily. for 10 days             Indwelling Lines/Drains at time of discharge:   Lines/Drains/Airways          None          Time spent on the discharge of patient: 30 minutes  Patient was seen and examined on the date of discharge and determined to be suitable for discharge.         Jose Clement MD  Department of Hospital Medicine  [facility]

## 2019-08-26 LAB
BACTERIA SPEC AEROBE CULT: NORMAL
ENTEROVIRUS: NOT DETECTED
GRAM STN SPEC: NORMAL
HUMAN BOCAVIRUS: NOT DETECTED
HUMAN CORONAVIRUS, COMMON COLD VIRUS: NOT DETECTED
INFLUENZA A - H1N1-09: NOT DETECTED
PARAINFLUENZA: NOT DETECTED
RVP - ADENOVIRUS: NOT DETECTED
RVP - HUMAN METAPNEUMOVIRUS (HMPV): NOT DETECTED
RVP - INFLUENZA A: NOT DETECTED
RVP - INFLUENZA B: NOT DETECTED
RVP - RESPIRATORY SYNCTIAL VIRUS (RSV) A: NOT DETECTED
RVP - RESPIRATORY VIRAL PANEL, SOURCE: NORMAL
RVP - RHINOVIRUS: NOT DETECTED

## 2019-08-27 ENCOUNTER — PATIENT OUTREACH (OUTPATIENT)
Dept: ADMINISTRATIVE | Facility: CLINIC | Age: 59
End: 2019-08-27

## 2019-08-27 LAB — FOLATE RBC-MCNC: 486 NG/ML (ref 280–791)

## 2019-08-27 NOTE — PROGRESS NOTES
Wife upset d\t biopsy not being done while in hospital. Was supposed to be called yesterday for date\time and contact Dr. Clement, hospitalist, if not contacted by today. She is unable to get in touch with him. She spoke with Carroll Doung who told her no biopsy scheduled. I sent secure message to Dr. Clement to clarify about outpatient biopsy. He states that he got in contact with pulmonary fellow to schedule and waiting to hear back. I then asked them who she should contact if not scheduled. He states either he or Carroll. He gave pt his cell number on secure message to call. Number given to pt if needed.

## 2019-08-27 NOTE — PLAN OF CARE
"8/27/19 10am Spoke with patient's wife Natty [phone] - wife confused as to whether or not patient is supposed to be set up for an outpatient biopsy prior to oncology appt stating "I thought Dr Clement mentioned something on Saturday before discharge" - secure chat sent to Dr Clement - he will reach out to pulm fellow   "

## 2019-08-27 NOTE — PATIENT INSTRUCTIONS
Pneumonia (Adult)  Pneumonia is an infection deep within the lungs. It is in the small air sacs (alveoli). Pneumonia may be caused by a virus or bacteria. Pneumonia caused by bacteria is usually treated with an antibiotic. Severe cases may need to be treated in the hospital. Milder cases can be treated at home. Symptoms usually start to get better during the first 2 days of treatment.    Home care  Follow these guidelines when caring for yourself at home:  · Rest at home for the first 2 to 3 days, or until you feel stronger. Dont let yourself get overly tired when you go back to your activities.  · Stay away from cigarette smoke - yours or other peoples.  · You may use acetaminophen or ibuprofen to control fever or pain, unless another medicine was prescribed. If you have chronic liver or kidney disease, talk with your healthcare provider before using these medicines. Also talk with your provider if youve had a stomach ulcer or gastrointestinal bleeding. Dont give aspirin to anyone younger than 18 years of age who is ill with a fever. It may cause severe liver damage.  · Your appetite may be poor, so a light diet is fine.  · Drink 6 to 8 glasses of fluids every day to make sure you are getting enough fluids. Beverages can include water, sport drinks, sodas without caffeine, juices, tea, or soup. Fluids will help loosen secretions in the lung. This will make it easier for you to cough up the phlegm (sputum). If you also have heart or kidney disease, check with your healthcare provider before you drink extra fluids.  · Take antibiotic medicine prescribed until it is all gone, even if you are feeling better after a few days.  Follow-up care  Follow up with your healthcare provider in the next 2 to 3 days, or as advised. This is to be sure the medicine is helping you get better.  If you are 65 or older, you should get a pneumococcal vaccine and a yearly flu (influenza) shot. You should also get these vaccines if  you have chronic lung disease like asthma, emphysema, or COPD. Recently, a second type of pneumonia vaccine has become available for everyone over 65 years old. This is in addition to the previous vaccine. Ask your provider about this.  When to seek medical advice  Call your healthcare provider right away if any of these occur:  · You dont get better within the first 48 hours of treatment  · Shortness of breath gets worse  · Rapid breathing (more than 25 breaths per minute)  · Coughing up blood  · Chest pain gets worse with breathing  · Fever of 100.4°F (38°C) or higher that doesnt get better with fever medicine  · Weakness, dizziness, or fainting that gets worse  · Thirst or dry mouth that gets worse  · Sinus pain, headache, or a stiff neck  · Chest pain not caused by coughing  Date Last Reviewed: 1/1/2017  © 4900-5415 [employer]. [address] [place] [id number]. All rights reserved. This information is not intended as a substitute for professional medical care. Always follow your healthcare professional's instructions.

## 2019-08-28 DIAGNOSIS — R91.8 LUNG MASS: Primary | ICD-10-CM

## 2019-08-28 DIAGNOSIS — J98.4 CAVITATING MASS IN LEFT UPPER LUNG LOBE: ICD-10-CM

## 2019-08-28 NOTE — PLAN OF CARE
8/28/19 9:15am Wife reached out to Dr Clement as she still hasn't heard from anyone to schedule outpatient bronch with biopsy - Dr Clement placed order - I spoke with [facility] [place] scheduling - procedure has to be scheduled by pulm that will be performing - spoke with Kaylah at pul clinic [id number] - patient is not known to them - she will forward urgent message to nurse to contact patient/wife with plans on how to move forward

## 2019-08-29 ENCOUNTER — TELEPHONE (OUTPATIENT)
Dept: PULMONOLOGY | Facility: CLINIC | Age: 59
End: 2019-08-29

## 2019-08-29 DIAGNOSIS — R91.8 LUNG MASS: Primary | ICD-10-CM

## 2019-08-29 NOTE — TELEPHONE ENCOUNTER
Pre Bronchoscopy instruction given to patient over the phone. Patient was instructed to remain in NPO after midnight. Patient was instructed to take morning medication with a little water and not to take blood thinners. Patient verbalized understanding [facility] and to have a designated .

## 2019-08-30 ENCOUNTER — HOSPITAL ENCOUNTER (OUTPATIENT)
Facility: HOSPITAL | Age: 59
Discharge: HOME | End: 2019-08-30
Attending: INTERNAL MEDICINE | Admitting: INTERNAL MEDICINE
Payer: COMMERCIAL

## 2019-08-30 VITALS
HEART RATE: 80 BPM | BODY MASS INDEX: 26.15 KG/M2 | RESPIRATION RATE: 20 BRPM | TEMPERATURE: 98 F | WEIGHT: 176.56 LBS | HEIGHT: 69 IN | OXYGEN SATURATION: 99 % | SYSTOLIC BLOOD PRESSURE: 91 MMHG | DIASTOLIC BLOOD PRESSURE: 50 MMHG

## 2019-08-30 DIAGNOSIS — R91.8 LUNG MASS: Primary | ICD-10-CM

## 2019-08-30 PROCEDURE — 88305 TISSUE SPECIMEN TO PATHOLOGY, CARDIOLOGY/PULMONARY: ICD-10-PCS | Mod: 26,,, | Performed by: PATHOLOGY

## 2019-08-30 PROCEDURE — 88342 CHG IMMUNOCYTOCHEMISTRY: ICD-10-PCS | Mod: 26,,, | Performed by: PATHOLOGY

## 2019-08-30 PROCEDURE — 88305 TISSUE EXAM BY PATHOLOGIST: CPT | Performed by: PATHOLOGY

## 2019-08-30 PROCEDURE — 88341 IMHCHEM/IMCYTCHM EA ADD ANTB: CPT | Performed by: PATHOLOGY

## 2019-08-30 PROCEDURE — 99152 MOD SED SAME PHYS/QHP 5/>YRS: CPT | Performed by: INTERNAL MEDICINE

## 2019-08-30 PROCEDURE — 88305 TISSUE EXAM BY PATHOLOGIST: CPT | Mod: 26,,, | Performed by: PATHOLOGY

## 2019-08-30 PROCEDURE — 88305 CYTOLOGY SPECIMEN- PULMONARY MEDICAL CYTOLOGY: ICD-10-PCS | Mod: 26,,, | Performed by: PATHOLOGY

## 2019-08-30 PROCEDURE — 88341 IMHCHEM/IMCYTCHM EA ADD ANTB: CPT | Mod: 26,,, | Performed by: PATHOLOGY

## 2019-08-30 PROCEDURE — 31625 BRONCHOSCOPY W/BIOPSY(S): CPT | Performed by: INTERNAL MEDICINE

## 2019-08-30 PROCEDURE — 88342 IMHCHEM/IMCYTCHM 1ST ANTB: CPT | Performed by: PATHOLOGY

## 2019-08-30 PROCEDURE — 88342 IMHCHEM/IMCYTCHM 1ST ANTB: CPT | Mod: 26,,, | Performed by: PATHOLOGY

## 2019-08-30 PROCEDURE — 88341 TISSUE SPECIMEN TO PATHOLOGY, CARDIOLOGY/PULMONARY: ICD-10-PCS | Mod: 26,,, | Performed by: PATHOLOGY

## 2019-08-30 PROCEDURE — 63600175 PHARM REV CODE 636 W HCPCS: Performed by: INTERNAL MEDICINE

## 2019-08-30 PROCEDURE — 31629 BRONCHOSCOPY/NEEDLE BX EACH: CPT | Performed by: INTERNAL MEDICINE

## 2019-08-30 PROCEDURE — 88341 PR IHC OR ICC EACH ADD'L SINGLE ANTIBODY  STAINPR: ICD-10-PCS | Mod: 26,,, | Performed by: PATHOLOGY

## 2019-08-30 PROCEDURE — 88342 TISSUE SPECIMEN TO PATHOLOGY, CARDIOLOGY/PULMONARY: ICD-10-PCS | Mod: 26,,, | Performed by: PATHOLOGY

## 2019-08-30 PROCEDURE — 99153 MOD SED SAME PHYS/QHP EA: CPT | Performed by: INTERNAL MEDICINE

## 2019-08-30 PROCEDURE — 88112 CYTOPATH CELL ENHANCE TECH: CPT | Mod: 26,,, | Performed by: PATHOLOGY

## 2019-08-30 PROCEDURE — 88112 PR  CYTOPATH, CELL ENHANCE TECH: ICD-10-PCS | Mod: 26,,, | Performed by: PATHOLOGY

## 2019-08-30 PROCEDURE — 27201129: Performed by: INTERNAL MEDICINE

## 2019-08-30 PROCEDURE — 25000003 PHARM REV CODE 250: Performed by: INTERNAL MEDICINE

## 2019-08-30 PROCEDURE — 27201011 HC FORCEPS DISPOSABLE: Performed by: INTERNAL MEDICINE

## 2019-08-30 PROCEDURE — 31625 PR BRONCHOSCOPY,BIOPSY: ICD-10-PCS | Mod: RT,,, | Performed by: INTERNAL MEDICINE

## 2019-08-30 PROCEDURE — 31625 BRONCHOSCOPY W/BIOPSY(S): CPT | Mod: RT,,, | Performed by: INTERNAL MEDICINE

## 2019-08-30 RX ORDER — LIDOCAINE HYDROCHLORIDE 10 MG/ML
1 INJECTION, SOLUTION EPIDURAL; INFILTRATION; INTRACAUDAL; PERINEURAL ONCE
Status: COMPLETED | OUTPATIENT
Start: 2019-08-30 | End: 2019-08-30

## 2019-08-30 RX ORDER — MIDAZOLAM HYDROCHLORIDE 5 MG/ML
INJECTION INTRAMUSCULAR; INTRAVENOUS CODE/TRAUMA/SEDATION MEDICATION
Status: COMPLETED | OUTPATIENT
Start: 2019-08-30 | End: 2019-08-30

## 2019-08-30 RX ORDER — LIDOCAINE HYDROCHLORIDE 10 MG/ML
INJECTION INFILTRATION; PERINEURAL CODE/TRAUMA/SEDATION MEDICATION
Status: COMPLETED | OUTPATIENT
Start: 2019-08-30 | End: 2019-08-30

## 2019-08-30 RX ORDER — FENTANYL CITRATE 50 UG/ML
INJECTION, SOLUTION INTRAMUSCULAR; INTRAVENOUS CODE/TRAUMA/SEDATION MEDICATION
Status: COMPLETED | OUTPATIENT
Start: 2019-08-30 | End: 2019-08-30

## 2019-08-30 RX ORDER — IBUPROFEN 200 MG
1 TABLET ORAL
COMMUNITY
End: 2020-08-20

## 2019-08-30 RX ORDER — LIDOCAINE HYDROCHLORIDE 20 MG/ML
INJECTION, SOLUTION INFILTRATION; PERINEURAL CODE/TRAUMA/SEDATION MEDICATION
Status: COMPLETED | OUTPATIENT
Start: 2019-08-30 | End: 2019-08-30

## 2019-08-30 RX ADMIN — LIDOCAINE HYDROCHLORIDE 6 ML: 20 INJECTION, SOLUTION INFILTRATION; PERINEURAL at 10:08

## 2019-08-30 RX ADMIN — LIDOCAINE HYDROCHLORIDE 8 ML: 10 INJECTION, SOLUTION INFILTRATION; PERINEURAL at 10:08

## 2019-08-30 RX ADMIN — MIDAZOLAM HYDROCHLORIDE 2 MG: 5 INJECTION, SOLUTION INTRAMUSCULAR; INTRAVENOUS at 10:08

## 2019-08-30 RX ADMIN — TOPICAL ANESTHETIC 0.5 ML: 200 SPRAY DENTAL; PERIODONTAL at 10:08

## 2019-08-30 RX ADMIN — LIDOCAINE HYDROCHLORIDE 1 MG: 10 INJECTION, SOLUTION EPIDURAL; INFILTRATION; INTRACAUDAL; PERINEURAL at 09:08

## 2019-08-30 RX ADMIN — FENTANYL CITRATE 50 MCG: 50 INJECTION, SOLUTION INTRAMUSCULAR; INTRAVENOUS at 10:08

## 2019-08-30 NOTE — H&P
[facility]  Pulmonology  H&P    Patient Name: Kashif Iverson  MRN: [id number]  Admission Date: 8/30/2019  Code Status: Full Code  Primary Care Provider: Raphael Santizo MD   Principal Problem: <principal problem not specified>    Subjective:     HPI:  Mr. Kashif Iverson is a 58 y.o. male, with PMH of tobacco use, who presented to [facility] ED on 8/22/19 after his coworkers noticed he was confused and wandering while at work at the [employer]. He was admitted to [facility] for hyponatremia. He was subsequently found to have a left upper lobe mass. He was discharged after correction of his sodium and presents today for bronchoscopy. Denies any new issues since discharge from [facility]. No complaints of chest pain, shortness of breath. Still with persistent cough.     Past Medical History:   Diagnosis Date    Hypertension     Lung mass     left lung       Past Surgical History:   Procedure Laterality Date    SPLENECTOMY, TOTAL         Review of patient's allergies indicates:  No Known Allergies    Family History     None        Tobacco Use    Smoking status: Current Every Day Smoker     Packs/day: 1.00     Years: 25.00     Pack years: 25.00     Types: Cigarettes    Smokeless tobacco: Never Used   Substance and Sexual Activity    Alcohol use: Not on file     Comment: 8/30/19- quit 3 weeks ago    Drug use: Never    Sexual activity: Yes     Partners: Female         Review of Systems   All other systems reviewed and are negative.    Objective:     Vital Signs (Most Recent):  Temp: 97.9 °F (36.6 °C) (08/30/19 1002)  Pulse: 87 (08/30/19 1016)  Resp: 20 (08/30/19 1016)  BP: 122/71 (08/30/19 1016)  SpO2: 99 % (08/30/19 1016) Vital Signs (24h Range):  Temp:  [97.9 °F (36.6 °C)] 97.9 °F (36.6 °C)  Pulse:  [83-87] 87  Resp:  [18-20] 20  SpO2:  [99 %] 99 %  BP: (122-136)/(71-83) 122/71     Weight: 80.1 kg (176 lb 9.4 oz)  Body mass index is 26.08 kg/m².    No intake or output data in the 24 hours ending 08/30/19  1020    Physical Exam   Constitutional: He is oriented to person, place, and time. He appears well-developed and well-nourished.   HENT:   Head: Normocephalic and atraumatic.   Eyes: Pupils are equal, round, and reactive to light. EOM are normal.   Cardiovascular: Normal rate, regular rhythm and normal heart sounds.   Pulmonary/Chest: Effort normal. No stridor. No respiratory distress.   Near Absent breath sounds on the left   Abdominal: Soft. Bowel sounds are normal. He exhibits no distension.   Musculoskeletal: Normal range of motion.   Neurological: He is alert and oriented to person, place, and time.   Skin: Skin is warm and dry.     CT chest reviewed and large mass in the left upper chest encasing left mainstem bronchus.    Assessment/Plan:     Lung mass  -will proceed with Bronch today    ASA 2  Mallampati 3             Jax Stroud MD  Pulmonology  [facility]

## 2019-08-30 NOTE — SEDATION DOCUMENTATION
H and P updated-yes, patient placed on cardiac monitor, anesthesia Plan:  Conscious sedation, ASA verified-yes, Airway exam performed-yes, Personal or Family history of anesthesia complications-No  Consent signed and witnessed, Gifty Chapman RN

## 2019-08-30 NOTE — SEDATION DOCUMENTATION
Moderate concious sedation was performed and cardiorespiratory functions were monitored the entire procedure by Gifty Chapman RN.  Sedation began at 1026am  and concluded at 1104am.

## 2019-08-30 NOTE — SEDATION DOCUMENTATION
Biopsy obtained from Left Main. Needle aspirate obtained. Patient tolerated well. Samples will be send to Pathology and Cytology.

## 2019-08-30 NOTE — SUBJECTIVE & OBJECTIVE
Past Medical History:   Diagnosis Date    Hypertension     Lung mass     left lung       Past Surgical History:   Procedure Laterality Date    SPLENECTOMY, TOTAL         Review of patient's allergies indicates:  No Known Allergies    Family History     None        Tobacco Use    Smoking status: Current Every Day Smoker     Packs/day: 1.00     Years: 25.00     Pack years: 25.00     Types: Cigarettes    Smokeless tobacco: Never Used   Substance and Sexual Activity    Alcohol use: Not on file     Comment: 8/30/19- quit 3 weeks ago    Drug use: Never    Sexual activity: Yes     Partners: Female         Review of Systems   All other systems reviewed and are negative.    Objective:     Vital Signs (Most Recent):  Temp: 97.9 °F (36.6 °C) (08/30/19 1002)  Pulse: 87 (08/30/19 1016)  Resp: 20 (08/30/19 1016)  BP: 122/71 (08/30/19 1016)  SpO2: 99 % (08/30/19 1016) Vital Signs (24h Range):  Temp:  [97.9 °F (36.6 °C)] 97.9 °F (36.6 °C)  Pulse:  [83-87] 87  Resp:  [18-20] 20  SpO2:  [99 %] 99 %  BP: (122-136)/(71-83) 122/71     Weight: 80.1 kg (176 lb 9.4 oz)  Body mass index is 26.08 kg/m².    No intake or output data in the 24 hours ending 08/30/19 1020    Physical Exam   Constitutional: He is oriented to person, place, and time. He appears well-developed and well-nourished.   HENT:   Head: Normocephalic and atraumatic.   Eyes: Pupils are equal, round, and reactive to light. EOM are normal.   Cardiovascular: Normal rate, regular rhythm and normal heart sounds.   Pulmonary/Chest: Effort normal. No stridor. No respiratory distress.   Near Absent breath sounds on the left   Abdominal: Soft. Bowel sounds are normal. He exhibits no distension.   Musculoskeletal: Normal range of motion.   Neurological: He is alert and oriented to person, place, and time.   Skin: Skin is warm and dry.     CT chest reviewed and large mass in the left upper chest encasing left mainstem bronchus.

## 2019-08-30 NOTE — HPI
Mr. Kashif Iverson is a 58 y.o. male, with PMH of tobacco use, who presented to [facility] ED on 8/22/19 after his coworkers noticed he was confused and wandering while at work at the [employer]. [facility] for hyp[facility]atremia. He was subsequently found to have a left upper lobe mass. He was discharged after correction of his sodium and presents today for bronchoscopy. Denies any new issues since discharge from [facility]. No complaints of chest pain, shortness of breath. Still with persistent cough.

## 2019-08-30 NOTE — PLAN OF CARE
Patient states they are ready to be discharged. Instructions and prescription given to patient and family. Both verbalize understanding. Patient tolerating po liquids with no difficulty. Patient states pain is at a tolerable level for them. Anesthesia consent and surgical consent in chart upon patient's discharge from [facility].

## 2019-08-31 NOTE — HOSPITAL COURSE
Patient underwent Bronchoscopy. Findings showed large left mainstem mass. Biopsies taken. Patient tolerated the procedure well.

## 2019-08-31 NOTE — DISCHARGE SUMMARY
[facility]  Pulmonology  Discharge Summary      Patient Name: Kashif Iverson  MRN: [id number]  Admission Date: 8/30/2019  Hospital Length of Stay: 0 days  Discharge Date and Time: 8/30/2019, 12:15pm  Attending Physician: No att. providers found   Discharging Provider: Jax Stroud MD  Primary Care Provider: Raphael Santizo MD    HPI:  Mr. Kashif Iverson is a 58 y.o. male, with PMH of tobacco use, who presented to [facility] ED on 8/22/19 after his coworkers noticed he was confused and wandering while at work at the [employer]. He was admitted to [facility] for hyponatremia. He was subsequently found to have a left upper lobe mass. He was discharged after correction of his sodium and presents today for bronchoscopy. Denies any new issues since discharge from [facility]. No complaints of chest pain, shortness of breath. Still with persistent cough.     Procedure(s) (LRB):  Bronchoscopy (N/A)    Indwelling Lines/Drains at Time of Discharge:   Lines/Drains/Airways          None          Hospital Course:  Patient underwent Bronchoscopy. Findings showed large left mainstem mass. Biopsies taken. Patient tolerated the procedure well.        Significant Labs:  None    Significant Imaging:  none    Pending Diagnostic Studies:     Procedure Component Value Units Date/Time    Cytology Specimen- Pulmonary Medical Cytology [[id number]] Collected:  08/30/19 1108    Order Status:  Sent Lab Status:  In process Updated:  08/30/19 1406    Specimen:  Other specimen location (comment)     Cytology Specimen-Medical Cytology (Fluid/Wash/Brush) [[id number]] Collected:  08/30/19 1107    Order Status:  Sent Lab Status:  In process Updated:  08/30/19 1412    Specimen:  BRONCHIAL WASHINGS from Bronch wash     Tissue Specimen To Pathology, Cardiology/Pulmonary [[id number]] Collected:  08/30/19 1108    Order Status:  Sent Lab Status:  In process Updated:  08/30/19 1241    Specimen:  Lung, biopsy not tranplant         Final Active  Diagnoses:    Diagnosis Date Noted POA    Lung mass [R91.8] 08/24/2019 Yes      Problems Resolved During this Admission:       Discharged Condition: good    Disposition: Home or Self Care    Follow Up:    Patient Instructions:      Diet general     Call MD for:  temperature >101     Call MD for:  coughing up blood greater than 3 tablespoons in volume     Call MD for:  difficulty breathing or shortness of breath     Call MD for:  chest pain     Cytology Specimen- Pulmonary Medical Cytology     Order Specific Question Answer Comments   Clinical Information: 59yo M w/ L mainstem mass    Specific Site: FNA left mainstem mass    Other Requests: none      Activity as tolerated     Medications:  Reconciled Home Medications:      Medication List      CONTINUE taking these medications    hydrocodone-chlorpheniramine 10-8 mg/5 mL suspension  Commonly known as:  TUSSIONEX  Take 5 mLs by mouth every 12 (twelve) hours as needed for Cough.     levoFLOXacin 750 MG tablet  Commonly known as:  LEVAQUIN  Take 1 tablet (750 mg total) by mouth once daily. for 10 days     nicotine 21 mg/24 hr  Commonly known as:  NICODERM CQ  Place 1 patch onto the skin every 24 hours.            Jax Stroud MD  Pulmonology  [facility] [address]

## 2019-09-02 ENCOUNTER — PATIENT MESSAGE (OUTPATIENT)
Dept: FAMILY MEDICINE | Facility: CLINIC | Age: 59
End: 2019-09-02

## 2019-09-03 ENCOUNTER — PATIENT MESSAGE (OUTPATIENT)
Dept: FAMILY MEDICINE | Facility: CLINIC | Age: 59
End: 2019-09-03

## 2019-09-03 ENCOUNTER — LAB VISIT (OUTPATIENT)
Dept: LAB | Facility: HOSPITAL | Age: 59
End: 2019-09-03
Attending: FAMILY MEDICINE
Payer: COMMERCIAL

## 2019-09-03 ENCOUNTER — OFFICE VISIT (OUTPATIENT)
Dept: FAMILY MEDICINE | Facility: CLINIC | Age: 59
End: 2019-09-03
Payer: COMMERCIAL

## 2019-09-03 VITALS
TEMPERATURE: 98 F | BODY MASS INDEX: 26.74 KG/M2 | HEIGHT: 69 IN | DIASTOLIC BLOOD PRESSURE: 76 MMHG | SYSTOLIC BLOOD PRESSURE: 132 MMHG | WEIGHT: 180.56 LBS | OXYGEN SATURATION: 97 % | HEART RATE: 115 BPM

## 2019-09-03 DIAGNOSIS — J18.9 PNEUMONIA OF LEFT LUNG DUE TO INFECTIOUS ORGANISM, UNSPECIFIED PART OF LUNG: ICD-10-CM

## 2019-09-03 DIAGNOSIS — R91.8 LUNG MASS: ICD-10-CM

## 2019-09-03 DIAGNOSIS — D63.8 ANEMIA, CHRONIC DISEASE: ICD-10-CM

## 2019-09-03 DIAGNOSIS — Z23 NEEDS FLU SHOT: ICD-10-CM

## 2019-09-03 DIAGNOSIS — Z11.59 NEED FOR HEPATITIS C SCREENING TEST: ICD-10-CM

## 2019-09-03 DIAGNOSIS — Z86.73 HISTORY OF COMPLETED STROKE: ICD-10-CM

## 2019-09-03 DIAGNOSIS — Z00.00 ANNUAL PHYSICAL EXAM: Primary | ICD-10-CM

## 2019-09-03 DIAGNOSIS — F17.200 SMOKER: ICD-10-CM

## 2019-09-03 DIAGNOSIS — Z23 NEED FOR DIPHTHERIA, TETANUS, PERTUSSIS, AND HIB VACCINATION: ICD-10-CM

## 2019-09-03 DIAGNOSIS — Z12.11 COLON CANCER SCREENING: ICD-10-CM

## 2019-09-03 DIAGNOSIS — K08.9 POOR DENTITION: ICD-10-CM

## 2019-09-03 DIAGNOSIS — Z23 NEED FOR INFLUENZA VACCINATION: ICD-10-CM

## 2019-09-03 DIAGNOSIS — Z00.00 ANNUAL PHYSICAL EXAM: ICD-10-CM

## 2019-09-03 DIAGNOSIS — Z23 NEED FOR VACCINE FOR DT (DIPHTHERIA-TETANUS): ICD-10-CM

## 2019-09-03 DIAGNOSIS — Z13.220 ENCOUNTER FOR LIPID SCREENING FOR CARDIOVASCULAR DISEASE: ICD-10-CM

## 2019-09-03 DIAGNOSIS — Z13.6 ENCOUNTER FOR LIPID SCREENING FOR CARDIOVASCULAR DISEASE: ICD-10-CM

## 2019-09-03 LAB
BILIRUB UR QL STRIP: NEGATIVE
CLARITY UR: CLEAR
COLOR UR: YELLOW
GLUCOSE UR QL STRIP: NEGATIVE
HGB UR QL STRIP: NEGATIVE
KETONES UR QL STRIP: NEGATIVE
LEUKOCYTE ESTERASE UR QL STRIP: NEGATIVE
NITRITE UR QL STRIP: NEGATIVE
PH UR STRIP: 6 [PH] (ref 5–8)
PROT UR QL STRIP: NEGATIVE
SP GR UR STRIP: 1.02 (ref 1–1.03)
URN SPEC COLLECT METH UR: NORMAL
UROBILINOGEN UR STRIP-ACNC: NEGATIVE EU/DL

## 2019-09-03 PROCEDURE — 99999 PR PBB SHADOW E&M-EST. PATIENT-LVL V: CPT | Mod: PBBFAC,,, | Performed by: FAMILY MEDICINE

## 2019-09-03 PROCEDURE — 90471 IMMUNIZATION ADMIN: CPT | Mod: S$GLB,,, | Performed by: FAMILY MEDICINE

## 2019-09-03 PROCEDURE — 81003 URINALYSIS AUTO W/O SCOPE: CPT

## 2019-09-03 PROCEDURE — 99386 PREV VISIT NEW AGE 40-64: CPT | Mod: 25,S$GLB,, | Performed by: FAMILY MEDICINE

## 2019-09-03 PROCEDURE — 99386 PR PREVENTIVE VISIT,NEW,40-64: ICD-10-PCS | Mod: 25,S$GLB,, | Performed by: FAMILY MEDICINE

## 2019-09-03 PROCEDURE — 99999 PR PBB SHADOW E&M-EST. PATIENT-LVL V: ICD-10-PCS | Mod: PBBFAC,,, | Performed by: FAMILY MEDICINE

## 2019-09-03 PROCEDURE — 90471 TDAP VACCINE GREATER THAN OR EQUAL TO 7YO IM: ICD-10-PCS | Mod: S$GLB,,, | Performed by: FAMILY MEDICINE

## 2019-09-03 PROCEDURE — 90715 TDAP VACCINE 7 YRS/> IM: CPT | Mod: S$GLB,,, | Performed by: FAMILY MEDICINE

## 2019-09-03 PROCEDURE — 90715 TDAP VACCINE GREATER THAN OR EQUAL TO 7YO IM: ICD-10-PCS | Mod: S$GLB,,, | Performed by: FAMILY MEDICINE

## 2019-09-03 RX ORDER — FERROUS GLUCONATE 324(38)MG
324 TABLET ORAL
Qty: 90 TABLET | Refills: 1 | Status: SHIPPED | OUTPATIENT
Start: 2019-09-03 | End: 2020-01-23 | Stop reason: ALTCHOICE

## 2019-09-03 NOTE — PROGRESS NOTES
"Chief Complaint   Patient presents with    Establish Care     SUBJECTIVE:   Kashif Iverson is a 58 y.o. male presenting for his annual checkup and status post ER visit for pneumonia and he found a mass on the left lung with concern for malignancy.  He is a lifelong smoker as well as alcohol use in the past.  He has poor dentition and he had biopsy performed awaiting pathology and he has Oncology follow-up on the 10th.  Since that time he does feel better he is breathing breathing better with the treatment that was given to him.  He has completed the antibiotics and he has done everything that he was asked to do and he is trying to quit and he is using nicotine patches currently.  Current Outpatient Medications   Medication Sig Dispense Refill    hydrocodone-chlorpheniramine (TUSSIONEX) 10-8 mg/5 mL suspension Take 5 mLs by mouth every 12 (twelve) hours as needed for Cough. 120 mL 0    levoFLOXacin (LEVAQUIN) 750 MG tablet Take 1 tablet (750 mg total) by mouth once daily. for 10 days 10 tablet 0    nicotine (NICODERM CQ) 21 mg/24 hr Place 1 patch onto the skin every 24 hours.      ferrous gluconate (FERGON) 324 MG tablet Take 1 tablet (324 mg total) by mouth daily with breakfast. 90 tablet 1     No current facility-administered medications for this visit.      Allergies: Patient has no known allergies.     ROS:  Feeling well. No dyspnea or chest pain on exertion. No abdominal pain, change in bowel habits, black or bloody stools. No urinary tract or prostatic symptoms. No neurological complaints.    OBJECTIVE:   The patient appears well, alert, oriented x 3, in no distress.   /76 (BP Location: Right arm, Patient Position: Sitting, BP Method: Medium (Manual))   Pulse (!) 115   Temp 98.1 °F (36.7 °C) (Oral)   Ht 5' 9" (1.753 m)   Wt 81.9 kg (180 lb 8.9 oz)   SpO2 97%   BMI 26.66 kg/m²        ENT abnormal.  Neck supple. No adenopathy or thyromegaly, poor dentition. DIANA. Lungs are clear, good air entry, no " wheezes, rhonchi or rales, really left side of lung sounded normal. S1 and S2 normal, no murmurs, with tachycardic rate and rhythm. Abdomen is soft without tenderness, guarding, mass or organomegaly.  exam: deferred.  Extremities show no edema, normal peripheral pulses. Neurological is normal without focal findings.    ASSESSMENT:   1. Annual physical exam    2. Colon cancer screening    3. Need for hepatitis C screening test    4. Need for diphtheria, tetanus, pertussis, and Hib vaccination    5. Need for influenza vaccination    6. Encounter for lipid screening for cardiovascular disease    7. Lung mass    8. Pneumonia of left lung due to infectious organism, unspecified part of lung    9. Anemia, chronic disease    10. Smoker    11. History of completed stroke    12. Poor dentition    13. Need for vaccine for DT (diphtheria-tetanus)    14. Needs flu shot      Patient with very concerning mass for malignancy and no real medical care over the last few years especially.  He has weight loss he has tachycardia he had hyponatremia and he has CT head findings concerning for remote strokes no obvious metastases but will need a complete workup.  Will get all of screening labs including a PSA and see about potentially get him screen for colon cancer as well may defer all of this to Hematology depend based on pathology findings no need to extend his coverage for pneumonia as he seems to resolve with that will keep him out of work until we get a more clear diagnosis and treatment plan they can defer all of his short-term disability FMLA to my office  PLAN:   Kashif was seen today for establish care.    Diagnoses and all orders for this visit:    Annual physical exam  -     CBC auto differential; Future  -     Comprehensive metabolic panel; Future  -     Hemoglobin A1c; Future  -     TSH; Future  -     Uric acid; Future  -     Urinalysis; Future  -     PSA, Screening; Future  -     ferrous gluconate (FERGON) 324 MG tablet;  Take 1 tablet (324 mg total) by mouth daily with breakfast.    Colon cancer screening  -     Case request GI: COLONOSCOPY    Need for hepatitis C screening test  -     Hepatitis C antibody; Future    Need for diphtheria, tetanus, pertussis, and Hib vaccination  -     Tdap Vaccine Greater than or Eqaul to 7 y.o.    Need for influenza vaccination    Encounter for lipid screening for cardiovascular disease  -     Lipid panel; Future    Lung mass    Pneumonia of left lung due to infectious organism, unspecified part of lung    Anemia, chronic disease    Smoker    History of completed stroke    Poor dentition    Need for vaccine for DT (diphtheria-tetanus)  -     Tdap Vaccine Greater than or Eqaul to 7 y.o.    Needs flu shot  -     Influenza - Quadrivalent (6 months+) (PF)    Other orders  -     Cancel: Case request GI: COLONOSCOPY  -     Cancel: Influenza - Quadrivalent (3 years & older)      Counseled on age appropriate medical preventative services, including age appropriate cancer screenings, over all nutritional health, need for a consistent exercise regimen and an over all push towards maintaining a vigorous and active lifestyle.  Counseled on age appropriate vaccines and discussed upcoming health care needs based on age/gender.  Spent time with patient counseling on need for a good patient/doctor relationship moving forward.  Discussed use of common OTC medications and supplements.  Discussed common dietary aids and use of caffeine and the need for good sleep hygiene and stress management.

## 2019-09-03 NOTE — PROGRESS NOTES
After obtaining consent, and per orders of Dr. Ruiz, injection of Tdap given IM into the right deltoid by Kacie Carmona. Patient instructed to remain in clinic for 20 minutes afterwards, and to report any adverse reaction to me immediately.

## 2019-09-04 ENCOUNTER — LAB VISIT (OUTPATIENT)
Dept: LAB | Facility: HOSPITAL | Age: 59
End: 2019-09-04
Attending: FAMILY MEDICINE
Payer: COMMERCIAL

## 2019-09-04 ENCOUNTER — TELEPHONE (OUTPATIENT)
Dept: FAMILY MEDICINE | Facility: CLINIC | Age: 59
End: 2019-09-04

## 2019-09-04 DIAGNOSIS — Z13.220 ENCOUNTER FOR LIPID SCREENING FOR CARDIOVASCULAR DISEASE: ICD-10-CM

## 2019-09-04 DIAGNOSIS — Z13.6 ENCOUNTER FOR LIPID SCREENING FOR CARDIOVASCULAR DISEASE: ICD-10-CM

## 2019-09-04 DIAGNOSIS — Z11.59 NEED FOR HEPATITIS C SCREENING TEST: ICD-10-CM

## 2019-09-04 DIAGNOSIS — Z00.00 ANNUAL PHYSICAL EXAM: ICD-10-CM

## 2019-09-04 LAB
ALBUMIN SERPL BCP-MCNC: 2.8 G/DL (ref 3.5–5.2)
ALP SERPL-CCNC: 120 U/L (ref 55–135)
ALT SERPL W/O P-5'-P-CCNC: 18 U/L (ref 10–44)
ANION GAP SERPL CALC-SCNC: 11 MMOL/L (ref 8–16)
AST SERPL-CCNC: 19 U/L (ref 10–40)
BASOPHILS # BLD AUTO: 0.1 K/UL (ref 0–0.2)
BASOPHILS NFR BLD: 0.8 % (ref 0–1.9)
BILIRUB SERPL-MCNC: 0.3 MG/DL (ref 0.1–1)
BUN SERPL-MCNC: 11 MG/DL (ref 6–20)
CALCIUM SERPL-MCNC: 10.2 MG/DL (ref 8.7–10.5)
CHLORIDE SERPL-SCNC: 95 MMOL/L (ref 95–110)
CHOLEST SERPL-MCNC: 174 MG/DL (ref 120–199)
CHOLEST/HDLC SERPL: 4 {RATIO} (ref 2–5)
CO2 SERPL-SCNC: 27 MMOL/L (ref 23–29)
COMPLEXED PSA SERPL-MCNC: 0.11 NG/ML (ref 0–4)
CREAT SERPL-MCNC: 0.8 MG/DL (ref 0.5–1.4)
DIFFERENTIAL METHOD: ABNORMAL
EOSINOPHIL # BLD AUTO: 0.1 K/UL (ref 0–0.5)
EOSINOPHIL NFR BLD: 0.9 % (ref 0–8)
ERYTHROCYTE [DISTWIDTH] IN BLOOD BY AUTOMATED COUNT: 16.8 % (ref 11.5–14.5)
EST. GFR  (AFRICAN AMERICAN): >60 ML/MIN/1.73 M^2
EST. GFR  (NON AFRICAN AMERICAN): >60 ML/MIN/1.73 M^2
ESTIMATED AVG GLUCOSE: 114 MG/DL (ref 68–131)
GLUCOSE SERPL-MCNC: 88 MG/DL (ref 70–110)
HBA1C MFR BLD HPLC: 5.6 % (ref 4–5.6)
HCT VFR BLD AUTO: 37.5 % (ref 40–54)
HCV AB SERPL QL IA: NEGATIVE
HDLC SERPL-MCNC: 43 MG/DL (ref 40–75)
HDLC SERPL: 24.7 % (ref 20–50)
HGB BLD-MCNC: 11.3 G/DL (ref 14–18)
IMM GRANULOCYTES # BLD AUTO: 0.07 K/UL (ref 0–0.04)
IMM GRANULOCYTES NFR BLD AUTO: 0.6 % (ref 0–0.5)
LDLC SERPL CALC-MCNC: 120 MG/DL (ref 63–159)
LYMPHOCYTES # BLD AUTO: 1.9 K/UL (ref 1–4.8)
LYMPHOCYTES NFR BLD: 16.1 % (ref 18–48)
MCH RBC QN AUTO: 24.5 PG (ref 27–31)
MCHC RBC AUTO-ENTMCNC: 30.1 G/DL (ref 32–36)
MCV RBC AUTO: 81 FL (ref 82–98)
MONOCYTES # BLD AUTO: 1.1 K/UL (ref 0.3–1)
MONOCYTES NFR BLD: 9.1 % (ref 4–15)
NEUTROPHILS # BLD AUTO: 8.8 K/UL (ref 1.8–7.7)
NEUTROPHILS NFR BLD: 72.5 % (ref 38–73)
NONHDLC SERPL-MCNC: 131 MG/DL
NRBC BLD-RTO: 0 /100 WBC
PLATELET # BLD AUTO: 538 K/UL (ref 150–350)
PMV BLD AUTO: 9.2 FL (ref 9.2–12.9)
POTASSIUM SERPL-SCNC: 4.6 MMOL/L (ref 3.5–5.1)
PROT SERPL-MCNC: 8.5 G/DL (ref 6–8.4)
RBC # BLD AUTO: 4.61 M/UL (ref 4.6–6.2)
SODIUM SERPL-SCNC: 133 MMOL/L (ref 136–145)
TRIGL SERPL-MCNC: 55 MG/DL (ref 30–150)
TSH SERPL DL<=0.005 MIU/L-ACNC: 0.96 UIU/ML (ref 0.4–4)
URATE SERPL-MCNC: 4.3 MG/DL (ref 3.4–7)
WBC # BLD AUTO: 12.08 K/UL (ref 3.9–12.7)

## 2019-09-04 PROCEDURE — 36415 COLL VENOUS BLD VENIPUNCTURE: CPT | Mod: PO

## 2019-09-04 PROCEDURE — 84443 ASSAY THYROID STIM HORMONE: CPT

## 2019-09-04 PROCEDURE — 84153 ASSAY OF PSA TOTAL: CPT

## 2019-09-04 PROCEDURE — 80053 COMPREHEN METABOLIC PANEL: CPT

## 2019-09-04 PROCEDURE — 84550 ASSAY OF BLOOD/URIC ACID: CPT

## 2019-09-04 PROCEDURE — 83036 HEMOGLOBIN GLYCOSYLATED A1C: CPT

## 2019-09-04 PROCEDURE — 86803 HEPATITIS C AB TEST: CPT

## 2019-09-04 PROCEDURE — 85025 COMPLETE CBC W/AUTO DIFF WBC: CPT

## 2019-09-04 PROCEDURE — 80061 LIPID PANEL: CPT

## 2019-09-04 NOTE — TELEPHONE ENCOUNTER
Advised Celine/[employer] that the forms have been received in office and that forms will be faxed back as soon as Dr. Ruiz completes them.  Celine verbalized understanding, stated she will update patient's file.     Forms in Dr. Ruiz's box.

## 2019-09-04 NOTE — TELEPHONE ENCOUNTER
----- Message from Marga Moris sent at 9/4/2019 12:21 PM CDT -----  Contact: [employer] [phone] claim# [id number]  Type: Patient Call Back    Who called: [employer]    What is the request in detail: insurance company is calling to see if we received FMLA paperwork on the patient    Can the clinic reply by [facility]? Call back    Would the patient rather a call back or a response via [facility]? Call back    Best call back number:[phone] claim# [id number]

## 2019-09-10 ENCOUNTER — LAB VISIT (OUTPATIENT)
Dept: LAB | Facility: HOSPITAL | Age: 59
End: 2019-09-10
Attending: FAMILY MEDICINE
Payer: COMMERCIAL

## 2019-09-10 ENCOUNTER — OFFICE VISIT (OUTPATIENT)
Dept: HEMATOLOGY/ONCOLOGY | Facility: CLINIC | Age: 59
End: 2019-09-10
Payer: COMMERCIAL

## 2019-09-10 VITALS
DIASTOLIC BLOOD PRESSURE: 67 MMHG | BODY MASS INDEX: 24.88 KG/M2 | WEIGHT: 177.69 LBS | SYSTOLIC BLOOD PRESSURE: 103 MMHG | TEMPERATURE: 98 F | OXYGEN SATURATION: 96 % | HEART RATE: 102 BPM | HEIGHT: 71 IN

## 2019-09-10 DIAGNOSIS — C34.02 LUNG CANCER, MAIN BRONCHUS, LEFT: Primary | ICD-10-CM

## 2019-09-10 DIAGNOSIS — C34.02 LUNG CANCER, MAIN BRONCHUS, LEFT: ICD-10-CM

## 2019-09-10 LAB
ALBUMIN SERPL BCP-MCNC: 2.8 G/DL (ref 3.5–5.2)
ALP SERPL-CCNC: 107 U/L (ref 55–135)
ALT SERPL W/O P-5'-P-CCNC: 12 U/L (ref 10–44)
ANION GAP SERPL CALC-SCNC: 7 MMOL/L (ref 8–16)
AST SERPL-CCNC: 14 U/L (ref 10–40)
BASOPHILS # BLD AUTO: 0.07 K/UL (ref 0–0.2)
BASOPHILS NFR BLD: 0.6 % (ref 0–1.9)
BILIRUB SERPL-MCNC: 0.3 MG/DL (ref 0.1–1)
BUN SERPL-MCNC: 8 MG/DL (ref 6–20)
CALCIUM SERPL-MCNC: 9.6 MG/DL (ref 8.7–10.5)
CHLORIDE SERPL-SCNC: 97 MMOL/L (ref 95–110)
CO2 SERPL-SCNC: 30 MMOL/L (ref 23–29)
CREAT SERPL-MCNC: 0.8 MG/DL (ref 0.5–1.4)
DIFFERENTIAL METHOD: ABNORMAL
EOSINOPHIL # BLD AUTO: 0.1 K/UL (ref 0–0.5)
EOSINOPHIL NFR BLD: 0.6 % (ref 0–8)
ERYTHROCYTE [DISTWIDTH] IN BLOOD BY AUTOMATED COUNT: 16.6 % (ref 11.5–14.5)
EST. GFR  (AFRICAN AMERICAN): >60 ML/MIN/1.73 M^2
EST. GFR  (NON AFRICAN AMERICAN): >60 ML/MIN/1.73 M^2
GLUCOSE SERPL-MCNC: 85 MG/DL (ref 70–110)
HCT VFR BLD AUTO: 34.8 % (ref 40–54)
HGB BLD-MCNC: 10.6 G/DL (ref 14–18)
LYMPHOCYTES # BLD AUTO: 1.5 K/UL (ref 1–4.8)
LYMPHOCYTES NFR BLD: 13.4 % (ref 18–48)
MCH RBC QN AUTO: 24.4 PG (ref 27–31)
MCHC RBC AUTO-ENTMCNC: 30.5 G/DL (ref 32–36)
MCV RBC AUTO: 80 FL (ref 82–98)
MONOCYTES # BLD AUTO: 1.1 K/UL (ref 0.3–1)
MONOCYTES NFR BLD: 9.9 % (ref 4–15)
NEUTROPHILS # BLD AUTO: 8.4 K/UL (ref 1.8–7.7)
NEUTROPHILS NFR BLD: 75.9 % (ref 38–73)
PLATELET # BLD AUTO: 484 K/UL (ref 150–350)
PMV BLD AUTO: 8.5 FL (ref 9.2–12.9)
POTASSIUM SERPL-SCNC: 4.5 MMOL/L (ref 3.5–5.1)
PROT SERPL-MCNC: 7.7 G/DL (ref 6–8.4)
RBC # BLD AUTO: 4.34 M/UL (ref 4.6–6.2)
SODIUM SERPL-SCNC: 134 MMOL/L (ref 136–145)
WBC # BLD AUTO: 11.11 K/UL (ref 3.9–12.7)

## 2019-09-10 PROCEDURE — 3008F PR BODY MASS INDEX (BMI) DOCUMENTED: ICD-10-PCS | Mod: CPTII,S$GLB,, | Performed by: INTERNAL MEDICINE

## 2019-09-10 PROCEDURE — 36415 COLL VENOUS BLD VENIPUNCTURE: CPT

## 2019-09-10 PROCEDURE — 99999 PR PBB SHADOW E&M-EST. PATIENT-LVL III: ICD-10-PCS | Mod: PBBFAC,,, | Performed by: INTERNAL MEDICINE

## 2019-09-10 PROCEDURE — 99205 OFFICE O/P NEW HI 60 MIN: CPT | Mod: S$GLB,,, | Performed by: INTERNAL MEDICINE

## 2019-09-10 PROCEDURE — 99205 PR OFFICE/OUTPT VISIT, NEW, LEVL V, 60-74 MIN: ICD-10-PCS | Mod: S$GLB,,, | Performed by: INTERNAL MEDICINE

## 2019-09-10 PROCEDURE — 85025 COMPLETE CBC W/AUTO DIFF WBC: CPT

## 2019-09-10 PROCEDURE — 3008F BODY MASS INDEX DOCD: CPT | Mod: CPTII,S$GLB,, | Performed by: INTERNAL MEDICINE

## 2019-09-10 PROCEDURE — 99999 PR PBB SHADOW E&M-EST. PATIENT-LVL III: CPT | Mod: PBBFAC,,, | Performed by: INTERNAL MEDICINE

## 2019-09-10 PROCEDURE — 80053 COMPREHEN METABOLIC PANEL: CPT

## 2019-09-10 RX ORDER — IBUPROFEN 800 MG/1
TABLET ORAL
Refills: 0 | COMMUNITY
Start: 2019-09-05 | End: 2020-01-24

## 2019-09-10 RX ORDER — CHLORHEXIDINE GLUCONATE ORAL RINSE 1.2 MG/ML
SOLUTION DENTAL
Refills: 1 | COMMUNITY
Start: 2019-09-05 | End: 2020-01-24

## 2019-09-10 NOTE — PROGRESS NOTES
Chief Complaint :   Lung cancer    Hx of Present illness :  Patient is a 58 y.o. year old male who presents to the clinic today for   Oncology evaluation. While at work, had sx of dehydration. 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 Next seen at [facility]. Had CT scan. Had mass In Lung. Sent to [facility] [place]. Started on antibiotics. Subsequently  Seen at [facility]. Had bronchoscopy. Documented to have  Obstructing lesion in midportion of left mainstem bronchus.       Allergies :    Review of patient's allergies indicates:  No Known Allergies    Occupation :  .     Transfusion :  None    Menstrual & obstetric Hx :  N/A      Present Meds :   Medication List with Changes/Refills   Current Medications    FERROUS GLUCONATE (FERGON) 324 MG TABLET    Take 1 tablet (324 mg total) by mouth daily with breakfast.    HYDROCODONE-CHLORPHENIRAMINE (TUSSIONEX) 10-8 MG/5 ML SUSPENSION    Take 5 mLs by mouth every 12 (twelve) hours as needed for Cough.    NICOTINE (NICODERM CQ) 21 MG/24 HR    Place 1 patch onto the skin every 24 hours.       Past Medical Hx :  No past hyx of DM,Hypertension, PUD, Hepatitis, liver disease, thyroid dysfuntion, CVA, seizure disorder. no9 hx of DVT or P)E>     Past Medical Hx :  Past Medical History:   Diagnosis Date    Hypertension     Lung mass     left lung       Travel Hx :  N/A    Immunization :  Immunization History   Administered Date(s) Administered    Pneumococcal Conjugate - 13 Valent 08/24/2019    Tdap 09/03/2019       Family Hx :  Family History   Problem Relation Age of Onset    Diabetes Mother     Heart defect Mother     Cancer Father     Cancer Sister     No Known Problems Son        Social Hx :  Started smoking  As a teen ager.  SMOKES   ONE ppd.  Social History     Socioeconomic History    Marital status:      Spouse name: Not on file    Number of children: Not on file    Years of education: Not on file    Highest education level: Not on  file   Occupational History    Not on file   Social Needs    Financial resource strain: Not very hard    Food insecurity:     Worry: Never true     Inability: Never true    Transportation needs:     Medical: No     Non-medical: No   Tobacco Use    Smoking status: Current Every Day Smoker     Packs/day: 1.00     Years: 25.00     Pack years: 25.00     Types: Cigarettes    Smokeless tobacco: Never Used   Substance and Sexual Activity    Alcohol use: Not on file     Comment: 8/30/19- quit 3 weeks ago    Drug use: Never    Sexual activity: Yes     Partners: Female   Lifestyle    Physical activity:     Days per week: 0 days     Minutes per session: 0 min    Stress: To some extent   Relationships    Social connections:     Talks on phone: Once a week     Gets together: Once a week     Attends [employer] service: Not on file     Active member of club or organization: No     Attends meetings of clubs or organizations: Never     Relationship status:    Other Topics Concern    Not on file   Social History Narrative    Not on file       Surgery :  Bronchoscopy. Has not had colonoscopy.    Symptoms :    Review of Systems   Constitutional: Positive for malaise/fatigue. Negative for chills, diaphoresis, fever and weight loss.        Appetite poor.    HENT: Negative for congestion, hearing loss, nosebleeds, sore throat and tinnitus.    Eyes: Negative for blurred vision, double vision and photophobia.   Respiratory: Negative for cough, hemoptysis, sputum production, shortness of breath and wheezing.    Cardiovascular: Negative for chest pain, palpitations, orthopnea, claudication and leg swelling.   Gastrointestinal: Negative for abdominal pain, blood in stool, constipation, diarrhea, heartburn, nausea and vomiting.   Genitourinary: Negative for dysuria, flank pain, frequency, hematuria and urgency.   Musculoskeletal: Positive for back pain. Negative for falls, joint pain, myalgias and neck pain.   Skin: Negative  for itching and rash.   Neurological: Negative for dizziness, tingling, tremors, sensory change and headaches.   Endo/Heme/Allergies: Negative for environmental allergies and polydipsia. Does not bruise/bleed easily.   Psychiatric/Behavioral: Negative for depression and memory loss. The patient is not nervous/anxious and does not have insomnia.        Physical Exam :  Wife, sisters and brother in law present in the room.  Physical Exam   Constitutional: He is oriented to person, place, and time and well-developed, well-nourished, and in no distress. Vital signs are normal. No distress.   HENT:   Head: Normocephalic and atraumatic.   Right Ear: External ear normal.   Left Ear: External ear normal.   Nose: Nose normal.   Mouth/Throat: Oropharynx is clear and moist. No oropharyngeal exudate.   Eyes: Pupils are equal, round, and reactive to light. Conjunctivae, EOM and lids are normal. Lids are everted and swept, no foreign bodies found. Right eye exhibits no discharge. Left eye exhibits no discharge.   Neck: Trachea normal, normal range of motion, full passive range of motion without pain and phonation normal. Neck supple. Normal carotid pulses, no hepatojugular reflux and no JVD present. Carotid bruit is not present. No tracheal deviation present. No thyroid mass and no thyromegaly present.   Cardiovascular: Normal rate, regular rhythm, normal heart sounds, intact distal pulses and normal pulses. PMI is not displaced. Exam reveals no gallop and no friction rub.   No murmur heard.  Pulmonary/Chest: Effort normal and breath sounds normal. No stridor. No apnea. No respiratory distress. He has no wheezes. He has no rales. He exhibits no tenderness.       Abdominal: Soft. Normal appearance, normal aorta and bowel sounds are normal. He exhibits no distension and no mass. There is no hepatosplenomegaly. There is no tenderness. There is no rebound, no guarding and no CVA tenderness. No hernia.   Musculoskeletal: Normal range  of motion. He exhibits no edema, tenderness or deformity.   Lymphadenopathy:        Head (right side): No submental, no submandibular, no tonsillar, no preauricular, no posterior auricular and no occipital adenopathy present.        Head (left side): No submental, no submandibular, no tonsillar, no preauricular, no posterior auricular and no occipital adenopathy present.     He has no cervical adenopathy.     He has no axillary adenopathy.        Right: No inguinal, no supraclavicular and no epitrochlear adenopathy present.        Left: No inguinal, no supraclavicular and no epitrochlear adenopathy present.   Neurological: He is alert and oriented to person, place, and time. He has normal sensation, normal strength, normal reflexes and intact cranial nerves. He displays normal reflexes. No cranial nerve deficit. He exhibits normal muscle tone. Gait normal. Coordination normal. GCS score is 15.   Skin: Skin is warm, dry and intact. No rash noted. He is not diaphoretic. No cyanosis or erythema. No pallor. Nails show no clubbing.   Psychiatric: Mood, memory, affect and judgment normal.   Nursing note and vitals reviewed.        Labs & Imaging : CT angiogram of chest 8/22/19 : large mass Left Upper lobe. mediastinal shift to left. No abdominal mets.  Small to moderate left Pleural fluid Left endo bronchial bx 8/30/19 : poorly differentiated squamous cell carcinoma.  CT head Small right parietal lobe infarct.         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  Should get PET/CT, MRI brain, CBC,CMP. Re evaluate with results. They Understand  And verbalised.

## 2019-09-18 ENCOUNTER — TELEPHONE (OUTPATIENT)
Dept: RADIOLOGY | Facility: HOSPITAL | Age: 59
End: 2019-09-18

## 2019-09-19 ENCOUNTER — HOSPITAL ENCOUNTER (OUTPATIENT)
Dept: RADIOLOGY | Facility: HOSPITAL | Age: 59
Discharge: HOME | End: 2019-09-19
Attending: INTERNAL MEDICINE
Payer: COMMERCIAL

## 2019-09-19 DIAGNOSIS — C34.02 LUNG CANCER, MAIN BRONCHUS, LEFT: ICD-10-CM

## 2019-09-19 PROCEDURE — 70553 MRI BRAIN STEM W/O & W/DYE: CPT | Mod: 26,,, | Performed by: RADIOLOGY

## 2019-09-19 PROCEDURE — 70553 MRI BRAIN STEM W/O & W/DYE: CPT | Mod: TC

## 2019-09-19 PROCEDURE — 70553 MRI BRAIN W WO CONTRAST: ICD-10-PCS | Mod: 26,,, | Performed by: RADIOLOGY

## 2019-09-19 PROCEDURE — 25500020 PHARM REV CODE 255: Performed by: INTERNAL MEDICINE

## 2019-09-19 PROCEDURE — A9585 GADOBUTROL INJECTION: HCPCS | Performed by: INTERNAL MEDICINE

## 2019-09-19 PROCEDURE — 78815 PET IMAGE W/CT SKULL-THIGH: CPT | Mod: TC

## 2019-09-19 PROCEDURE — 78815 PET IMAGE W/CT SKULL-THIGH: CPT | Mod: 26,PS,, | Performed by: RADIOLOGY

## 2019-09-19 PROCEDURE — 78815 NM PET CT ROUTINE: ICD-10-PCS | Mod: 26,PS,, | Performed by: RADIOLOGY

## 2019-09-19 PROCEDURE — A9552 F18 FDG: HCPCS

## 2019-09-19 RX ORDER — GADOBUTROL 604.72 MG/ML
8 INJECTION INTRAVENOUS
Status: COMPLETED | OUTPATIENT
Start: 2019-09-19 | End: 2019-09-19

## 2019-09-19 RX ADMIN — GADOBUTROL 8 ML: 604.72 INJECTION INTRAVENOUS at 01:09

## 2019-09-20 ENCOUNTER — PATIENT MESSAGE (OUTPATIENT)
Dept: FAMILY MEDICINE | Facility: CLINIC | Age: 59
End: 2019-09-20

## 2019-09-21 ENCOUNTER — PATIENT MESSAGE (OUTPATIENT)
Dept: FAMILY MEDICINE | Facility: CLINIC | Age: 59
End: 2019-09-21

## 2019-09-21 ENCOUNTER — PATIENT MESSAGE (OUTPATIENT)
Dept: HEMATOLOGY/ONCOLOGY | Facility: CLINIC | Age: 59
End: 2019-09-21

## 2019-09-22 ENCOUNTER — PATIENT MESSAGE (OUTPATIENT)
Dept: FAMILY MEDICINE | Facility: CLINIC | Age: 59
End: 2019-09-22

## 2019-09-22 ENCOUNTER — PATIENT MESSAGE (OUTPATIENT)
Dept: HEMATOLOGY/ONCOLOGY | Facility: CLINIC | Age: 59
End: 2019-09-22

## 2019-09-24 ENCOUNTER — OFFICE VISIT (OUTPATIENT)
Dept: HEMATOLOGY/ONCOLOGY | Facility: CLINIC | Age: 59
End: 2019-09-24
Payer: COMMERCIAL

## 2019-09-24 ENCOUNTER — PATIENT MESSAGE (OUTPATIENT)
Dept: FAMILY MEDICINE | Facility: CLINIC | Age: 59
End: 2019-09-24

## 2019-09-24 VITALS
TEMPERATURE: 98 F | SYSTOLIC BLOOD PRESSURE: 139 MMHG | HEART RATE: 111 BPM | BODY MASS INDEX: 26.02 KG/M2 | HEIGHT: 71 IN | WEIGHT: 185.88 LBS | OXYGEN SATURATION: 97 % | DIASTOLIC BLOOD PRESSURE: 87 MMHG

## 2019-09-24 DIAGNOSIS — C79.51 SECONDARY MALIGNANT NEOPLASM OF BONE: ICD-10-CM

## 2019-09-24 DIAGNOSIS — C34.02 LUNG CANCER, MAIN BRONCHUS, LEFT: Primary | ICD-10-CM

## 2019-09-24 DIAGNOSIS — C34.02 LUNG CANCER, MAIN BRONCHUS, LEFT: ICD-10-CM

## 2019-09-24 PROCEDURE — 3008F BODY MASS INDEX DOCD: CPT | Mod: CPTII,S$GLB,, | Performed by: INTERNAL MEDICINE

## 2019-09-24 PROCEDURE — 99999 PR PBB SHADOW E&M-EST. PATIENT-LVL III: CPT | Mod: PBBFAC,,, | Performed by: INTERNAL MEDICINE

## 2019-09-24 PROCEDURE — 99215 OFFICE O/P EST HI 40 MIN: CPT | Mod: S$GLB,,, | Performed by: INTERNAL MEDICINE

## 2019-09-24 PROCEDURE — 99999 PR PBB SHADOW E&M-EST. PATIENT-LVL III: ICD-10-PCS | Mod: PBBFAC,,, | Performed by: INTERNAL MEDICINE

## 2019-09-24 PROCEDURE — 99215 PR OFFICE/OUTPT VISIT, EST, LEVL V, 40-54 MIN: ICD-10-PCS | Mod: S$GLB,,, | Performed by: INTERNAL MEDICINE

## 2019-09-24 PROCEDURE — 3008F PR BODY MASS INDEX (BMI) DOCUMENTED: ICD-10-PCS | Mod: CPTII,S$GLB,, | Performed by: INTERNAL MEDICINE

## 2019-09-24 RX ORDER — OXYCODONE AND ACETAMINOPHEN 5; 325 MG/1; MG/1
1 TABLET ORAL EVERY 6 HOURS PRN
Qty: 40 TABLET | Refills: 0 | Status: SHIPPED | OUTPATIENT
Start: 2019-09-24 | End: 2020-01-23 | Stop reason: ALTCHOICE

## 2019-09-24 NOTE — PROGRESS NOTES
Chief Complaint :   Lung cancer    Hx of Present illness :  Patient is a 58 y.o. year old male who presents to the clinic today for   Oncology evaluation. While at work, had sx of dehydration. 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 Next seen at [facility]. Had CT scan. Had mass In Lung. Sent to [facility], [place], operated by [facility]. Started on antibiotics. Subsequently  Seen at [facility]. Had bronchoscopy. Documented to have  Obstructing lesion in midportion of left mainstem bronchus.  Here to discuss management.      Allergies :    Review of patient's allergies indicates:  No Known Allergies    Occupation :  .     Transfusion :  None    Menstrual & obstetric Hx :  N/A      Present Meds :   Medication List with Changes/Refills   Current Medications    CHLORHEXIDINE (PERIDEX) 0.12 % SOLUTION    U 5 ML PO BID FOR 1 MINUTE.  DO NOT SWALLOW.  DO NOT EAT OR DRINK FOR 1 HOUR AFTER USAGE.    FERROUS GLUCONATE (FERGON) 324 MG TABLET    Take 1 tablet (324 mg total) by mouth daily with breakfast.    HYDROCODONE-CHLORPHENIRAMINE (TUSSIONEX) 10-8 MG/5 ML SUSPENSION    Take 5 mLs by mouth every 12 (twelve) hours as needed for Cough.    IBUPROFEN (ADVIL,MOTRIN) 800 MG TABLET    TK 1 T PO Q 6 H PRN P    NICOTINE (NICODERM CQ) 21 MG/24 HR    Place 1 patch onto the skin every 24 hours.       Past Medical Hx :  No past hyx of DM,Hypertension, PUD, Hepatitis, liver disease, thyroid dysfuntion, CVA, seizure disorder. no9 hx of DVT or P)E>     Past Medical Hx :  Past Medical History:   Diagnosis Date    Hypertension     Lung mass     left lung       Travel Hx :  N/A    Immunization :  Immunization History   Administered Date(s) Administered    Pneumococcal Conjugate - 13 Valent 08/24/2019    Tdap 09/03/2019       Family Hx :  Family History   Problem Relation Age of Onset    Diabetes Mother     Heart defect Mother     Cancer Father     Cancer Sister     No Known Problems Son        Social Hx :  Started smoking  As a teen  ager.  SMOKES   ONE ppd.  Social History     Socioeconomic History    Marital status:      Spouse name: Not on file    Number of children: Not on file    Years of education: Not on file    Highest education level: Not on file   Occupational History    Not on file   Social Needs    Financial resource strain: Not very hard    Food insecurity:     Worry: Never true     Inability: Never true    Transportation needs:     Medical: No     Non-medical: No   Tobacco Use    Smoking status: Current Every Day Smoker     Packs/day: 1.00     Years: 25.00     Pack years: 25.00     Types: Cigarettes    Smokeless tobacco: Never Used   Substance and Sexual Activity    Alcohol use: Not on file     Comment: 8/30/19- quit 3 weeks ago    Drug use: Never    Sexual activity: Yes     Partners: Female   Lifestyle    Physical activity:     Days per week: 0 days     Minutes per session: 0 min    Stress: To some extent   Relationships    Social connections:     Talks on phone: Once a week     Gets together: Once a week     Attends Faith service: Not on file     Active member of club or organization: No     Attends meetings of clubs or organizations: Never     Relationship status:    Other Topics Concern    Not on file   Social History Narrative    Not on file       Surgery :  Bronchoscopy. Has not had colonoscopy.    Symptoms :    Review of Systems   Constitutional: Positive for malaise/fatigue. Negative for chills, diaphoresis, fever and weight loss.        Appetite poor.    HENT: Negative for congestion, hearing loss, nosebleeds, sore throat and tinnitus.    Eyes: Negative for blurred vision, double vision and photophobia.   Respiratory: Negative for cough, hemoptysis, sputum production, shortness of breath and wheezing.    Cardiovascular: Negative for chest pain, palpitations, orthopnea, claudication and leg swelling.   Gastrointestinal: Negative for abdominal pain, blood in stool, constipation, diarrhea,  heartburn, nausea and vomiting.   Genitourinary: Negative for dysuria, flank pain, frequency, hematuria and urgency.   Musculoskeletal: Positive for back pain. Negative for falls, joint pain, myalgias and neck pain.   Skin: Negative for itching and rash.   Neurological: Negative for dizziness, tingling, tremors, sensory change and headaches.   Endo/Heme/Allergies: Negative for environmental allergies and polydipsia. Does not bruise/bleed easily.   Psychiatric/Behavioral: Negative for depression and memory loss. The patient is not nervous/anxious and does not have insomnia.        Physical Exam :  Wife, sisters and brother in law present in the room.  Physical Exam   Constitutional: He is oriented to person, place, and time and well-developed, well-nourished, and in no distress. Vital signs are normal. No distress.   HENT:   Head: Normocephalic and atraumatic.   Right Ear: External ear normal.   Left Ear: External ear normal.   Nose: Nose normal.   Mouth/Throat: Oropharynx is clear and moist. No oropharyngeal exudate.   Eyes: Pupils are equal, round, and reactive to light. Conjunctivae, EOM and lids are normal. Lids are everted and swept, no foreign bodies found. Right eye exhibits no discharge. Left eye exhibits no discharge.   Neck: Trachea normal, normal range of motion, full passive range of motion without pain and phonation normal. Neck supple. Normal carotid pulses, no hepatojugular reflux and no JVD present. Carotid bruit is not present. No tracheal deviation present. No thyroid mass and no thyromegaly present.   Cardiovascular: Normal rate, regular rhythm, normal heart sounds, intact distal pulses and normal pulses. PMI is not displaced. Exam reveals no gallop and no friction rub.   No murmur heard.  Pulmonary/Chest: Effort normal and breath sounds normal. No stridor. No apnea. No respiratory distress. He has no wheezes. He has no rales. He exhibits no tenderness.       Abdominal: Soft. Normal appearance,  normal aorta and bowel sounds are normal. He exhibits no distension and no mass. There is no hepatosplenomegaly. There is no tenderness. There is no rebound, no guarding and no CVA tenderness. No hernia.   Musculoskeletal: Normal range of motion. He exhibits no edema, tenderness or deformity.   Lymphadenopathy:        Head (right side): No submental, no submandibular, no tonsillar, no preauricular, no posterior auricular and no occipital adenopathy present.        Head (left side): No submental, no submandibular, no tonsillar, no preauricular, no posterior auricular and no occipital adenopathy present.     He has no cervical adenopathy.     He has no axillary adenopathy.        Right: No inguinal, no supraclavicular and no epitrochlear adenopathy present.        Left: No inguinal, no supraclavicular and no epitrochlear adenopathy present.   Neurological: He is alert and oriented to person, place, and time. He has normal sensation, normal strength, normal reflexes and intact cranial nerves. No cranial nerve deficit. He exhibits normal muscle tone. Gait normal. Coordination normal. GCS score is 15.   Skin: Skin is warm, dry and intact. No rash noted. He is not diaphoretic. No cyanosis or erythema. No pallor. Nails show no clubbing.   Psychiatric: Mood, memory, affect and judgment normal.   Nursing note and vitals reviewed.    Labs & Imaging : CT angiogram of chest 8/22/19 : large mass Left Upper lobe. mediastinal shift to left. No abdominal mets.  Small to moderate left Pleural fluid Left endo bronchial bx 8/30/19 : poorly differentiated squamous cell carcinoma.  CT head Small right parietal lobe infarct.   09/10/19 : NFBS 85; Cr.0.8. Ca 9.6. Bili 0.3 liver enzymes normal.  Hgb 10.6; hct 34.8. MCV 80. Platelets 484,000 ANC 8,400.   09/190/19 : MRI brain : suggestive of subacute infarctions right. No hge or hematomal. No mets. PET/CT : mets to right sixth and ninth ribs. Small left pleural effusion. 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 Mets to right ribs      Assessment & Plan:  Reviewed with patient, spouse and daughter,   about Dx of lung cancer with rib mets. Recommend concurrent chemoradiation. Attempt to control the cancer. Not a cure. Use carboplatin + taxol weekly with radiation. All potential and reasonable side effects explained. Consent obtained. Refer to  for radiation. Schedule chemoscool. Percocet 5/325 Q 6 Hours for pain.

## 2019-09-24 NOTE — Clinical Note
Please schedule [facility] for Carboplatin + Taxol concurrent with radiation. Refer to  for radiation. Prescription sent to pharmacy for Percocet

## 2019-09-25 ENCOUNTER — PATIENT MESSAGE (OUTPATIENT)
Dept: FAMILY MEDICINE | Facility: CLINIC | Age: 59
End: 2019-09-25

## 2019-09-25 ENCOUNTER — TELEPHONE (OUTPATIENT)
Dept: FAMILY MEDICINE | Facility: CLINIC | Age: 59
End: 2019-09-25

## 2019-09-25 NOTE — TELEPHONE ENCOUNTER
Call placed to Pt, spoke with his wife-Natty and informed that the papers was ready for  at the office. She stated that she would pick them up tomorrow.    ,DirectAddress_Unknown

## 2019-09-25 NOTE — TELEPHONE ENCOUNTER
Return call to Pt, spoke with his wife-Natty and informed that the papers that was sent has been misplaced and per Provider request ask if she would refax them to the office. She states that she will locate the papers and give us a return call.

## 2019-09-25 NOTE — TELEPHONE ENCOUNTER
----- Message from Betsy Greene sent at 9/25/2019 11:28 AM CDT -----  Contact: Natty-Spouse  Type: Patient Call Back    Who called:Natty    What is the request in detail: Natty called to speak with Eugenie stating that she faxed paperwork on yesterday and received conformation. Please call      Can the clinic reply by [facility]?    Would the patient rather a call back or a response via [facility]? Call    Best call back number:[phone]

## 2019-09-27 ENCOUNTER — PATIENT MESSAGE (OUTPATIENT)
Dept: FAMILY MEDICINE | Facility: CLINIC | Age: 59
End: 2019-09-27

## 2019-09-30 ENCOUNTER — PATIENT MESSAGE (OUTPATIENT)
Dept: FAMILY MEDICINE | Facility: CLINIC | Age: 59
End: 2019-09-30

## 2019-09-30 ENCOUNTER — CLINICAL SUPPORT (OUTPATIENT)
Dept: HEMATOLOGY/ONCOLOGY | Facility: CLINIC | Age: 59
End: 2019-09-30
Payer: COMMERCIAL

## 2019-09-30 PROCEDURE — 99999 PR PBB SHADOW E&M-EST. PATIENT-LVL I: ICD-10-PCS | Mod: PBBFAC,,,

## 2019-09-30 PROCEDURE — 99999 PR PBB SHADOW E&M-EST. PATIENT-LVL I: CPT | Mod: PBBFAC,,,

## 2019-09-30 NOTE — PROGRESS NOTES
Chemotherapy Education    Kashif Iverson attended chemotherapy class with significant other x 1.  They watched the [facility] chemotherapy video & were given an [facility] cancer resource binder with handouts on Taxol & Carboplatin.  Most common side effects and regimen reviewed.  Information provided for all support groups and additional relevant resources.  Also provided my contact information for any future questions or concerns. Maximum support offered.  Schedule provided and reviewed. Provided XRT is ready to start next week, chemo will start next Tuesday.

## 2019-10-01 DIAGNOSIS — T45.1X5A CHEMOTHERAPY-INDUCED NAUSEA: ICD-10-CM

## 2019-10-01 DIAGNOSIS — C79.51 SECONDARY MALIGNANT NEOPLASM OF BONE: ICD-10-CM

## 2019-10-01 DIAGNOSIS — R11.0 CHEMOTHERAPY-INDUCED NAUSEA: ICD-10-CM

## 2019-10-01 DIAGNOSIS — C34.02 LUNG CANCER, MAIN BRONCHUS, LEFT: Primary | ICD-10-CM

## 2019-10-01 RX ORDER — ONDANSETRON HYDROCHLORIDE 8 MG/1
8 TABLET, FILM COATED ORAL EVERY 8 HOURS PRN
Qty: 30 TABLET | Refills: 2 | Status: SHIPPED | OUTPATIENT
Start: 2019-10-01 | End: 2020-08-20

## 2019-10-01 RX ORDER — PROCHLORPERAZINE MALEATE 10 MG
10 TABLET ORAL EVERY 6 HOURS PRN
Qty: 30 TABLET | Refills: 1 | Status: SHIPPED | OUTPATIENT
Start: 2019-10-01 | End: 2020-01-24

## 2019-10-08 ENCOUNTER — INFUSION (OUTPATIENT)
Dept: INFUSION THERAPY | Facility: HOSPITAL | Age: 59
End: 2019-10-08
Attending: INTERNAL MEDICINE
Payer: COMMERCIAL

## 2019-10-08 VITALS
SYSTOLIC BLOOD PRESSURE: 143 MMHG | HEART RATE: 85 BPM | OXYGEN SATURATION: 98 % | RESPIRATION RATE: 17 BRPM | DIASTOLIC BLOOD PRESSURE: 83 MMHG | TEMPERATURE: 99 F

## 2019-10-08 DIAGNOSIS — C34.02 LUNG CANCER, MAIN BRONCHUS, LEFT: Primary | ICD-10-CM

## 2019-10-08 DIAGNOSIS — C79.51 SECONDARY MALIGNANT NEOPLASM OF BONE: ICD-10-CM

## 2019-10-08 LAB
ALBUMIN SERPL BCP-MCNC: 3.6 G/DL (ref 3.5–5.2)
ALP SERPL-CCNC: 90 U/L (ref 55–135)
ALT SERPL W/O P-5'-P-CCNC: 10 U/L (ref 10–44)
ANION GAP SERPL CALC-SCNC: 8 MMOL/L (ref 8–16)
AST SERPL-CCNC: 12 U/L (ref 10–40)
BILIRUB SERPL-MCNC: 0.3 MG/DL (ref 0.1–1)
BUN SERPL-MCNC: 11 MG/DL (ref 6–20)
CALCIUM SERPL-MCNC: 9.6 MG/DL (ref 8.7–10.5)
CHLORIDE SERPL-SCNC: 99 MMOL/L (ref 95–110)
CO2 SERPL-SCNC: 29 MMOL/L (ref 23–29)
CREAT SERPL-MCNC: 0.8 MG/DL (ref 0.5–1.4)
ERYTHROCYTE [DISTWIDTH] IN BLOOD BY AUTOMATED COUNT: 19.2 % (ref 11.5–14.5)
EST. GFR  (AFRICAN AMERICAN): >60 ML/MIN/1.73 M^2
EST. GFR  (NON AFRICAN AMERICAN): >60 ML/MIN/1.73 M^2
GLUCOSE SERPL-MCNC: 142 MG/DL (ref 70–110)
HCT VFR BLD AUTO: 39.1 % (ref 40–54)
HGB BLD-MCNC: 12.3 G/DL (ref 14–18)
MCH RBC QN AUTO: 25.7 PG (ref 27–31)
MCHC RBC AUTO-ENTMCNC: 31.5 G/DL (ref 32–36)
MCV RBC AUTO: 82 FL (ref 82–98)
NEUTROPHILS # BLD AUTO: 6.5 K/UL (ref 1.8–7.7)
PLATELET # BLD AUTO: 417 K/UL (ref 150–350)
PMV BLD AUTO: 8.5 FL (ref 9.2–12.9)
POTASSIUM SERPL-SCNC: 3.7 MMOL/L (ref 3.5–5.1)
PROT SERPL-MCNC: 8.1 G/DL (ref 6–8.4)
RBC # BLD AUTO: 4.79 M/UL (ref 4.6–6.2)
SODIUM SERPL-SCNC: 136 MMOL/L (ref 136–145)
WBC # BLD AUTO: 9.41 K/UL (ref 3.9–12.7)

## 2019-10-08 PROCEDURE — 96375 TX/PRO/DX INJ NEW DRUG ADDON: CPT

## 2019-10-08 PROCEDURE — 25000003 PHARM REV CODE 250: Performed by: INTERNAL MEDICINE

## 2019-10-08 PROCEDURE — 96417 CHEMO IV INFUS EACH ADDL SEQ: CPT

## 2019-10-08 PROCEDURE — 96413 CHEMO IV INFUSION 1 HR: CPT

## 2019-10-08 PROCEDURE — 63600175 PHARM REV CODE 636 W HCPCS: Performed by: INTERNAL MEDICINE

## 2019-10-08 PROCEDURE — 80053 COMPREHEN METABOLIC PANEL: CPT

## 2019-10-08 PROCEDURE — S0028 INJECTION, FAMOTIDINE, 20 MG: HCPCS | Performed by: INTERNAL MEDICINE

## 2019-10-08 PROCEDURE — 85027 COMPLETE CBC AUTOMATED: CPT

## 2019-10-08 PROCEDURE — 96367 TX/PROPH/DG ADDL SEQ IV INF: CPT

## 2019-10-08 RX ORDER — HEPARIN 100 UNIT/ML
500 SYRINGE INTRAVENOUS
Status: DISCONTINUED | OUTPATIENT
Start: 2019-10-08 | End: 2019-10-08 | Stop reason: HOSPADM

## 2019-10-08 RX ORDER — SODIUM CHLORIDE 0.9 % (FLUSH) 0.9 %
10 SYRINGE (ML) INJECTION
Status: DISCONTINUED | OUTPATIENT
Start: 2019-10-08 | End: 2019-10-08 | Stop reason: HOSPADM

## 2019-10-08 RX ORDER — HEPARIN 100 UNIT/ML
500 SYRINGE INTRAVENOUS
Status: CANCELLED | OUTPATIENT
Start: 2019-10-08

## 2019-10-08 RX ORDER — SODIUM CHLORIDE 0.9 % (FLUSH) 0.9 %
10 SYRINGE (ML) INJECTION
Status: CANCELLED | OUTPATIENT
Start: 2019-10-08

## 2019-10-08 RX ORDER — FAMOTIDINE 10 MG/ML
20 INJECTION INTRAVENOUS
Status: COMPLETED | OUTPATIENT
Start: 2019-10-08 | End: 2019-10-08

## 2019-10-08 RX ORDER — FAMOTIDINE 10 MG/ML
20 INJECTION INTRAVENOUS
Status: CANCELLED | OUTPATIENT
Start: 2019-10-08

## 2019-10-08 RX ORDER — DIPHENHYDRAMINE HYDROCHLORIDE 50 MG/ML
50 INJECTION INTRAMUSCULAR; INTRAVENOUS ONCE AS NEEDED
Status: CANCELLED | OUTPATIENT
Start: 2019-10-08

## 2019-10-08 RX ORDER — EPINEPHRINE 0.3 MG/.3ML
0.3 INJECTION SUBCUTANEOUS ONCE AS NEEDED
Status: CANCELLED | OUTPATIENT
Start: 2019-10-08

## 2019-10-08 RX ADMIN — DEXAMETHASONE SODIUM PHOSPHATE: 10 INJECTION, SOLUTION INTRAMUSCULAR; INTRAVENOUS at 02:10

## 2019-10-08 RX ADMIN — PACLITAXEL 90 MG: 100 INJECTION INTRAVENOUS at 03:10

## 2019-10-08 RX ADMIN — CARBOPLATIN 290 MG: 10 INJECTION INTRAVENOUS at 04:10

## 2019-10-08 RX ADMIN — FAMOTIDINE 20 MG: 10 INJECTION INTRAVENOUS at 02:10

## 2019-10-08 RX ADMIN — DIPHENHYDRAMINE HYDROCHLORIDE 50 MG: 50 INJECTION INTRAMUSCULAR; INTRAVENOUS at 03:10

## 2019-10-14 ENCOUNTER — LAB VISIT (OUTPATIENT)
Dept: LAB | Facility: HOSPITAL | Age: 59
End: 2019-10-14
Attending: INTERNAL MEDICINE
Payer: COMMERCIAL

## 2019-10-14 DIAGNOSIS — C34.02 LUNG CANCER, MAIN BRONCHUS, LEFT: ICD-10-CM

## 2019-10-14 LAB
ALBUMIN SERPL BCP-MCNC: 3.2 G/DL (ref 3.5–5.2)
ALP SERPL-CCNC: 83 U/L (ref 55–135)
ALT SERPL W/O P-5'-P-CCNC: 9 U/L (ref 10–44)
ANION GAP SERPL CALC-SCNC: 7 MMOL/L (ref 8–16)
AST SERPL-CCNC: 11 U/L (ref 10–40)
BILIRUB SERPL-MCNC: 0.3 MG/DL (ref 0.1–1)
BUN SERPL-MCNC: 11 MG/DL (ref 6–20)
CALCIUM SERPL-MCNC: 9.4 MG/DL (ref 8.7–10.5)
CHLORIDE SERPL-SCNC: 98 MMOL/L (ref 95–110)
CO2 SERPL-SCNC: 31 MMOL/L (ref 23–29)
CREAT SERPL-MCNC: 0.7 MG/DL (ref 0.5–1.4)
ERYTHROCYTE [DISTWIDTH] IN BLOOD BY AUTOMATED COUNT: 19.8 % (ref 11.5–14.5)
EST. GFR  (AFRICAN AMERICAN): >60 ML/MIN/1.73 M^2
EST. GFR  (NON AFRICAN AMERICAN): >60 ML/MIN/1.73 M^2
GLUCOSE SERPL-MCNC: 81 MG/DL (ref 70–110)
HCT VFR BLD AUTO: 39.2 % (ref 40–54)
HGB BLD-MCNC: 12.2 G/DL (ref 14–18)
MCH RBC QN AUTO: 26.7 PG (ref 27–31)
MCHC RBC AUTO-ENTMCNC: 31.1 G/DL (ref 32–36)
MCV RBC AUTO: 86 FL (ref 82–98)
NEUTROPHILS # BLD AUTO: 7.6 K/UL (ref 1.8–7.7)
PLATELET # BLD AUTO: 343 K/UL (ref 150–350)
PMV BLD AUTO: 9.2 FL (ref 9.2–12.9)
POTASSIUM SERPL-SCNC: 4.1 MMOL/L (ref 3.5–5.1)
PROT SERPL-MCNC: 7.3 G/DL (ref 6–8.4)
RBC # BLD AUTO: 4.57 M/UL (ref 4.6–6.2)
SODIUM SERPL-SCNC: 136 MMOL/L (ref 136–145)
WBC # BLD AUTO: 10.18 K/UL (ref 3.9–12.7)

## 2019-10-14 PROCEDURE — 36415 COLL VENOUS BLD VENIPUNCTURE: CPT | Mod: PO

## 2019-10-14 PROCEDURE — 80053 COMPREHEN METABOLIC PANEL: CPT

## 2019-10-14 PROCEDURE — 85027 COMPLETE CBC AUTOMATED: CPT

## 2019-10-15 ENCOUNTER — OFFICE VISIT (OUTPATIENT)
Dept: HEMATOLOGY/ONCOLOGY | Facility: CLINIC | Age: 59
End: 2019-10-15
Payer: COMMERCIAL

## 2019-10-15 ENCOUNTER — INFUSION (OUTPATIENT)
Dept: INFUSION THERAPY | Facility: HOSPITAL | Age: 59
End: 2019-10-15
Attending: INTERNAL MEDICINE
Payer: COMMERCIAL

## 2019-10-15 VITALS
OXYGEN SATURATION: 99 % | HEIGHT: 71 IN | DIASTOLIC BLOOD PRESSURE: 78 MMHG | WEIGHT: 189.81 LBS | TEMPERATURE: 98 F | HEART RATE: 84 BPM | BODY MASS INDEX: 26.57 KG/M2 | SYSTOLIC BLOOD PRESSURE: 129 MMHG | RESPIRATION RATE: 18 BRPM

## 2019-10-15 VITALS
DIASTOLIC BLOOD PRESSURE: 70 MMHG | HEART RATE: 75 BPM | OXYGEN SATURATION: 100 % | TEMPERATURE: 98 F | SYSTOLIC BLOOD PRESSURE: 114 MMHG | RESPIRATION RATE: 17 BRPM

## 2019-10-15 DIAGNOSIS — C79.51 SECONDARY MALIGNANT NEOPLASM OF BONE: Primary | ICD-10-CM

## 2019-10-15 DIAGNOSIS — Z09 CHEMOTHERAPY FOLLOW-UP EXAMINATION: ICD-10-CM

## 2019-10-15 DIAGNOSIS — C34.02 LUNG CANCER, MAIN BRONCHUS, LEFT: Primary | ICD-10-CM

## 2019-10-15 DIAGNOSIS — C79.51 SECONDARY MALIGNANT NEOPLASM OF BONE: ICD-10-CM

## 2019-10-15 DIAGNOSIS — C34.02 LUNG CANCER, MAIN BRONCHUS, LEFT: ICD-10-CM

## 2019-10-15 PROCEDURE — S0028 INJECTION, FAMOTIDINE, 20 MG: HCPCS | Performed by: INTERNAL MEDICINE

## 2019-10-15 PROCEDURE — 25000003 PHARM REV CODE 250: Performed by: INTERNAL MEDICINE

## 2019-10-15 PROCEDURE — 96375 TX/PRO/DX INJ NEW DRUG ADDON: CPT

## 2019-10-15 PROCEDURE — 3008F PR BODY MASS INDEX (BMI) DOCUMENTED: ICD-10-PCS | Mod: CPTII,S$GLB,, | Performed by: INTERNAL MEDICINE

## 2019-10-15 PROCEDURE — 99214 OFFICE O/P EST MOD 30 MIN: CPT | Mod: S$GLB,,, | Performed by: INTERNAL MEDICINE

## 2019-10-15 PROCEDURE — 96413 CHEMO IV INFUSION 1 HR: CPT

## 2019-10-15 PROCEDURE — 96367 TX/PROPH/DG ADDL SEQ IV INF: CPT

## 2019-10-15 PROCEDURE — 99999 PR PBB SHADOW E&M-EST. PATIENT-LVL III: CPT | Mod: PBBFAC,,, | Performed by: INTERNAL MEDICINE

## 2019-10-15 PROCEDURE — 63600175 PHARM REV CODE 636 W HCPCS: Performed by: INTERNAL MEDICINE

## 2019-10-15 PROCEDURE — 3008F BODY MASS INDEX DOCD: CPT | Mod: CPTII,S$GLB,, | Performed by: INTERNAL MEDICINE

## 2019-10-15 PROCEDURE — 96417 CHEMO IV INFUS EACH ADDL SEQ: CPT

## 2019-10-15 PROCEDURE — 99214 PR OFFICE/OUTPT VISIT, EST, LEVL IV, 30-39 MIN: ICD-10-PCS | Mod: S$GLB,,, | Performed by: INTERNAL MEDICINE

## 2019-10-15 PROCEDURE — 99999 PR PBB SHADOW E&M-EST. PATIENT-LVL III: ICD-10-PCS | Mod: PBBFAC,,, | Performed by: INTERNAL MEDICINE

## 2019-10-15 RX ORDER — DIPHENHYDRAMINE HYDROCHLORIDE 50 MG/ML
50 INJECTION INTRAMUSCULAR; INTRAVENOUS ONCE AS NEEDED
Status: DISCONTINUED | OUTPATIENT
Start: 2019-10-15 | End: 2019-10-15 | Stop reason: HOSPADM

## 2019-10-15 RX ORDER — SODIUM CHLORIDE 0.9 % (FLUSH) 0.9 %
10 SYRINGE (ML) INJECTION
Status: CANCELLED | OUTPATIENT
Start: 2019-10-15

## 2019-10-15 RX ORDER — HEPARIN 100 UNIT/ML
500 SYRINGE INTRAVENOUS
Status: CANCELLED | OUTPATIENT
Start: 2019-10-15

## 2019-10-15 RX ORDER — DIPHENHYDRAMINE HYDROCHLORIDE 50 MG/ML
50 INJECTION INTRAMUSCULAR; INTRAVENOUS ONCE AS NEEDED
Status: CANCELLED | OUTPATIENT
Start: 2019-10-15

## 2019-10-15 RX ORDER — FAMOTIDINE 10 MG/ML
20 INJECTION INTRAVENOUS
Status: CANCELLED | OUTPATIENT
Start: 2019-10-15

## 2019-10-15 RX ORDER — EPINEPHRINE 0.3 MG/.3ML
0.3 INJECTION SUBCUTANEOUS ONCE AS NEEDED
Status: DISCONTINUED | OUTPATIENT
Start: 2019-10-15 | End: 2019-10-15 | Stop reason: HOSPADM

## 2019-10-15 RX ORDER — FAMOTIDINE 10 MG/ML
20 INJECTION INTRAVENOUS
Status: COMPLETED | OUTPATIENT
Start: 2019-10-15 | End: 2019-10-15

## 2019-10-15 RX ORDER — EPINEPHRINE 0.3 MG/.3ML
0.3 INJECTION SUBCUTANEOUS ONCE AS NEEDED
Status: CANCELLED | OUTPATIENT
Start: 2019-10-15

## 2019-10-15 RX ADMIN — DIPHENHYDRAMINE HYDROCHLORIDE 50 MG: 50 INJECTION INTRAMUSCULAR; INTRAVENOUS at 11:10

## 2019-10-15 RX ADMIN — HYDROCORTISONE SODIUM SUCCINATE 100 MG: 100 INJECTION, POWDER, FOR SOLUTION INTRAMUSCULAR; INTRAVENOUS at 11:10

## 2019-10-15 RX ADMIN — CARBOPLATIN 300 MG: 10 INJECTION INTRAVENOUS at 01:10

## 2019-10-15 RX ADMIN — PALONOSETRON HYDROCHLORIDE: 0.25 INJECTION, SOLUTION INTRAVENOUS at 10:10

## 2019-10-15 RX ADMIN — PACLITAXEL 90 MG: 100 INJECTION INTRAVENOUS at 11:10

## 2019-10-15 RX ADMIN — FAMOTIDINE 20 MG: 10 INJECTION INTRAVENOUS at 11:10

## 2019-10-15 NOTE — PLAN OF CARE
"Pt completed Taxol/Carbo infusion today. Less than 10 min into Taxol infusion patient complaint of "feeling hot" and that he feels like he "can't breath". Pt visibly flushed. Taxol paused, IVF infusing. VS obtained, BP elevated, O2 sat 98% on room air. 2L O2 via NC applied for comfort. Pt stated he felt better once the Taxol was paused with full relief of symptoms within 10 min. Solu-cortef given. Taxol restarted and completed without further issue. Dr. Cruz notified during unit rounds. VSS. AVS given. Pt d.c home in stable condition with instructions to return 10/22/19. In interim, pt knows to call clinic with any issues.     "

## 2019-10-15 NOTE — PROGRESS NOTES
Chief Complaint :   Lung cancer    Hx of Present illness :  Patient is a 58 y.o. year old male who presents to the clinic today for   Chemotherapy followup.  Found to have obstructing lesion left main stem bronchus. 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 Presently on concurrent chemoradiation.  Also has mets to right ribs    Allergies :    Review of patient's allergies indicates:  No Known Allergies    Occupation :  .     Transfusion :  None    Menstrual & obstetric Hx :  N/A      Present Meds :   Medication List with Changes/Refills   Current Medications    CHLORHEXIDINE (PERIDEX) 0.12 % SOLUTION    U 5 ML PO BID FOR 1 MINUTE.  DO NOT SWALLOW.  DO NOT EAT OR DRINK FOR 1 HOUR AFTER USAGE.    FERROUS GLUCONATE (FERGON) 324 MG TABLET    Take 1 tablet (324 mg total) by mouth daily with breakfast.    HYDROCODONE-CHLORPHENIRAMINE (TUSSIONEX) 10-8 MG/5 ML SUSPENSION    Take 5 mLs by mouth every 12 (twelve) hours as needed for Cough.    IBUPROFEN (ADVIL,MOTRIN) 800 MG TABLET    TK 1 T PO Q 6 H PRN P    NICOTINE (NICODERM CQ) 21 MG/24 HR    Place 1 patch onto the skin every 24 hours.    ONDANSETRON (ZOFRAN) 8 MG TABLET    Take 1 tablet (8 mg total) by mouth every 8 (eight) hours as needed for Nausea.    OXYCODONE-ACETAMINOPHEN (PERCOCET) 5-325 MG PER TABLET    Take 1 tablet by mouth every 6 (six) hours as needed for Pain.    PROCHLORPERAZINE (COMPAZINE) 10 MG TABLET    Take 1 tablet (10 mg total) by mouth every 6 (six) hours as needed.       Past Medical Hx :  No past hyx of DM,Hypertension, PUD, Hepatitis, liver disease, thyroid dysfuntion, CVA, seizure disorder. no9 hx of DVT or P)E>     Past Medical Hx :  Past Medical History:   Diagnosis Date    Hypertension     Lung mass     left lung       Travel Hx :  N/A    Immunization :  Immunization History   Administered Date(s) Administered    Pneumococcal Conjugate - 13 Valent 08/24/2019    Tdap 09/03/2019       Family Hx  :  Family History   Problem Relation Age of Onset    Diabetes Mother     Heart defect Mother     Cancer Father     Cancer Sister     No Known Problems Son        Social Hx :  Started smoking  As a teen ager.  SMOKES   ONE ppd.  Social History     Socioeconomic History    Marital status:      Spouse name: Not on file    Number of children: Not on file    Years of education: Not on file    Highest education level: Not on file   Occupational History    Not on file   Social Needs    Financial resource strain: Not very hard    Food insecurity:     Worry: Never true     Inability: Never true    Transportation needs:     Medical: No     Non-medical: No   Tobacco Use    Smoking status: Current Every Day Smoker     Packs/day: 1.00     Years: 25.00     Pack years: 25.00     Types: Cigarettes    Smokeless tobacco: Never Used   Substance and Sexual Activity    Alcohol use: Not on file     Comment: 8/30/19- quit 3 weeks ago    Drug use: Never    Sexual activity: Yes     Partners: Female   Lifestyle    Physical activity:     Days per week: 0 days     Minutes per session: 0 min    Stress: To some extent   Relationships    Social connections:     Talks on phone: Once a week     Gets together: Once a week     Attends Mu-ism service: Not on file     Active member of club or organization: No     Attends meetings of clubs or organizations: Never     Relationship status:    Other Topics Concern    Not on file   Social History Narrative    Not on file       Surgery :  Bronchoscopy. Has not had colonoscopy.    Symptoms :    Review of Systems   Constitutional: Positive for malaise/fatigue. Negative for chills, diaphoresis, fever and weight loss.        Appetite  Fair. Has gained nine poun ds   HENT: Negative for congestion, hearing loss, nosebleeds, sore throat and tinnitus.    Eyes: Negative for blurred vision, double vision and photophobia.   Respiratory: Negative for cough, hemoptysis, sputum  production, shortness of breath and wheezing.    Cardiovascular: Negative for chest pain, palpitations, orthopnea, claudication and leg swelling.   Gastrointestinal: Negative for abdominal pain, blood in stool, constipation, diarrhea, heartburn, nausea and vomiting.   Genitourinary: Negative for dysuria, flank pain, frequency, hematuria and urgency.   Musculoskeletal: Positive for back pain. Negative for falls, joint pain, myalgias and neck pain.   Skin: Negative for itching and rash.   Neurological: Negative for dizziness, tingling, tremors, sensory change and headaches.   Endo/Heme/Allergies: Negative for environmental allergies and polydipsia. Does not bruise/bleed easily.   Psychiatric/Behavioral: Negative for depression and memory loss. The patient is not nervous/anxious and does not have insomnia.        Physical Exam :  Wife, sisters and brother in law present in the room.  Physical Exam   Constitutional: He is oriented to person, place, and time and well-developed, well-nourished, and in no distress. Vital signs are normal. No distress.   HENT:   Head: Normocephalic and atraumatic.   Right Ear: External ear normal.   Left Ear: External ear normal.   Nose: Nose normal.   Mouth/Throat: Oropharynx is clear and moist. No oropharyngeal exudate.   Eyes: Pupils are equal, round, and reactive to light. Conjunctivae, EOM and lids are normal. Lids are everted and swept, no foreign bodies found. Right eye exhibits no discharge. Left eye exhibits no discharge.   Neck: Trachea normal, normal range of motion, full passive range of motion without pain and phonation normal. Neck supple. Normal carotid pulses, no hepatojugular reflux and no JVD present. Carotid bruit is not present. No tracheal deviation present. No thyroid mass and no thyromegaly present.   Cardiovascular: Normal rate, regular rhythm, normal heart sounds, intact distal pulses and normal pulses. PMI is not displaced. Exam reveals no gallop and no friction  rub.   No murmur heard.  Pulmonary/Chest: Effort normal and breath sounds normal. No stridor. No apnea. No respiratory distress. He has no wheezes. He has no rales. He exhibits no tenderness.       Abdominal: Soft. Normal appearance, normal aorta and bowel sounds are normal. He exhibits no distension and no mass. There is no hepatosplenomegaly. There is no tenderness. There is no rebound, no guarding and no CVA tenderness. No hernia.   Musculoskeletal: Normal range of motion. He exhibits no edema, tenderness or deformity.   Lymphadenopathy:        Head (right side): No submental, no submandibular, no tonsillar, no preauricular, no posterior auricular and no occipital adenopathy present.        Head (left side): No submental, no submandibular, no tonsillar, no preauricular, no posterior auricular and no occipital adenopathy present.     He has no cervical adenopathy.     He has no axillary adenopathy.        Right: No inguinal, no supraclavicular and no epitrochlear adenopathy present.        Left: No inguinal, no supraclavicular and no epitrochlear adenopathy present.   Neurological: He is alert and oriented to person, place, and time. He has normal sensation, normal strength, normal reflexes and intact cranial nerves. No cranial nerve deficit. He exhibits normal muscle tone. Gait normal. Coordination normal. GCS score is 15.   Skin: Skin is warm, dry and intact. No rash noted. He is not diaphoretic. No cyanosis or erythema. No pallor. Nails show no clubbing.   Psychiatric: Mood, memory, affect and judgment normal.   Nursing note and vitals reviewed.    Labs & Imaging :  10/14/19 :  NFBS 81; Cr.0.7; ca 9.4; bili 0.3. Liver enzymes normal. hgb 12.2; hct 39.2; MCV 86.  343,000. ANC 7,200    Dx : chemotherapy followup. 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 Mets to right ribs      Assessment & Plan:  Reviewed with patient, spouse and daughter,  Cleared for weekly  carbo + taxol. Continue present management.

## 2019-10-21 ENCOUNTER — LAB VISIT (OUTPATIENT)
Dept: LAB | Facility: HOSPITAL | Age: 59
End: 2019-10-21
Attending: INTERNAL MEDICINE
Payer: COMMERCIAL

## 2019-10-21 DIAGNOSIS — C79.51 SECONDARY MALIGNANT NEOPLASM OF BONE: ICD-10-CM

## 2019-10-21 DIAGNOSIS — C34.02 LUNG CANCER, MAIN BRONCHUS, LEFT: ICD-10-CM

## 2019-10-21 DIAGNOSIS — Z09 CHEMOTHERAPY FOLLOW-UP EXAMINATION: ICD-10-CM

## 2019-10-21 LAB
ALBUMIN SERPL BCP-MCNC: 3.2 G/DL (ref 3.5–5.2)
ALP SERPL-CCNC: 96 U/L (ref 55–135)
ALT SERPL W/O P-5'-P-CCNC: 12 U/L (ref 10–44)
ANION GAP SERPL CALC-SCNC: 7 MMOL/L (ref 8–16)
AST SERPL-CCNC: 12 U/L (ref 10–40)
BASOPHILS # BLD AUTO: 0.06 K/UL (ref 0–0.2)
BASOPHILS NFR BLD: 0.7 % (ref 0–1.9)
BILIRUB SERPL-MCNC: 0.4 MG/DL (ref 0.1–1)
BUN SERPL-MCNC: 8 MG/DL (ref 6–20)
CALCIUM SERPL-MCNC: 9.5 MG/DL (ref 8.7–10.5)
CHLORIDE SERPL-SCNC: 99 MMOL/L (ref 95–110)
CO2 SERPL-SCNC: 30 MMOL/L (ref 23–29)
CREAT SERPL-MCNC: 0.7 MG/DL (ref 0.5–1.4)
DIFFERENTIAL METHOD: ABNORMAL
EOSINOPHIL # BLD AUTO: 0.1 K/UL (ref 0–0.5)
EOSINOPHIL NFR BLD: 1.4 % (ref 0–8)
ERYTHROCYTE [DISTWIDTH] IN BLOOD BY AUTOMATED COUNT: 20.5 % (ref 11.5–14.5)
EST. GFR  (AFRICAN AMERICAN): >60 ML/MIN/1.73 M^2
EST. GFR  (NON AFRICAN AMERICAN): >60 ML/MIN/1.73 M^2
GLUCOSE SERPL-MCNC: 127 MG/DL (ref 70–110)
HCT VFR BLD AUTO: 40.9 % (ref 40–54)
HGB BLD-MCNC: 12.6 G/DL (ref 14–18)
LYMPHOCYTES # BLD AUTO: 1.1 K/UL (ref 1–4.8)
LYMPHOCYTES NFR BLD: 12.7 % (ref 18–48)
MCH RBC QN AUTO: 26.1 PG (ref 27–31)
MCHC RBC AUTO-ENTMCNC: 30.8 G/DL (ref 32–36)
MCV RBC AUTO: 85 FL (ref 82–98)
MONOCYTES # BLD AUTO: 0.8 K/UL (ref 0.3–1)
MONOCYTES NFR BLD: 8.9 % (ref 4–15)
NEUTROPHILS # BLD AUTO: 6.4 K/UL (ref 1.8–7.7)
NEUTROPHILS NFR BLD: 75.6 % (ref 38–73)
NRBC BLD-RTO: 0 /100 WBC
PLATELET # BLD AUTO: 350 K/UL (ref 150–350)
PMV BLD AUTO: 9.3 FL (ref 9.2–12.9)
POTASSIUM SERPL-SCNC: 4.4 MMOL/L (ref 3.5–5.1)
PROT SERPL-MCNC: 7.3 G/DL (ref 6–8.4)
RBC # BLD AUTO: 4.83 M/UL (ref 4.6–6.2)
SODIUM SERPL-SCNC: 136 MMOL/L (ref 136–145)
WBC # BLD AUTO: 8.41 K/UL (ref 3.9–12.7)

## 2019-10-21 PROCEDURE — 85025 COMPLETE CBC W/AUTO DIFF WBC: CPT | Mod: PO

## 2019-10-21 PROCEDURE — 36415 COLL VENOUS BLD VENIPUNCTURE: CPT | Mod: PO

## 2019-10-21 PROCEDURE — 80053 COMPREHEN METABOLIC PANEL: CPT

## 2019-10-22 ENCOUNTER — TELEPHONE (OUTPATIENT)
Dept: HEMATOLOGY/ONCOLOGY | Facility: HOSPITAL | Age: 59
End: 2019-10-22

## 2019-10-22 ENCOUNTER — INFUSION (OUTPATIENT)
Dept: INFUSION THERAPY | Facility: HOSPITAL | Age: 59
End: 2019-10-22
Attending: INTERNAL MEDICINE
Payer: COMMERCIAL

## 2019-10-22 VITALS
RESPIRATION RATE: 16 BRPM | SYSTOLIC BLOOD PRESSURE: 110 MMHG | TEMPERATURE: 98 F | DIASTOLIC BLOOD PRESSURE: 63 MMHG | OXYGEN SATURATION: 99 % | HEART RATE: 82 BPM

## 2019-10-22 DIAGNOSIS — C34.02 LUNG CANCER, MAIN BRONCHUS, LEFT: ICD-10-CM

## 2019-10-22 DIAGNOSIS — C79.51 SECONDARY MALIGNANT NEOPLASM OF BONE: Primary | ICD-10-CM

## 2019-10-22 PROCEDURE — 25000003 PHARM REV CODE 250: Performed by: INTERNAL MEDICINE

## 2019-10-22 PROCEDURE — 96413 CHEMO IV INFUSION 1 HR: CPT

## 2019-10-22 PROCEDURE — 96375 TX/PRO/DX INJ NEW DRUG ADDON: CPT

## 2019-10-22 PROCEDURE — S0028 INJECTION, FAMOTIDINE, 20 MG: HCPCS | Performed by: INTERNAL MEDICINE

## 2019-10-22 PROCEDURE — 63600175 PHARM REV CODE 636 W HCPCS: Performed by: INTERNAL MEDICINE

## 2019-10-22 PROCEDURE — 96367 TX/PROPH/DG ADDL SEQ IV INF: CPT

## 2019-10-22 RX ORDER — SODIUM CHLORIDE 0.9 % (FLUSH) 0.9 %
10 SYRINGE (ML) INJECTION
Status: CANCELLED | OUTPATIENT
Start: 2019-10-22

## 2019-10-22 RX ORDER — MEPERIDINE HYDROCHLORIDE 50 MG/ML
25 INJECTION INTRAMUSCULAR; INTRAVENOUS; SUBCUTANEOUS ONCE
Status: COMPLETED | OUTPATIENT
Start: 2019-10-22 | End: 2019-10-22

## 2019-10-22 RX ORDER — EPINEPHRINE 0.3 MG/.3ML
0.3 INJECTION SUBCUTANEOUS ONCE AS NEEDED
Status: DISCONTINUED | OUTPATIENT
Start: 2019-10-22 | End: 2019-10-22 | Stop reason: HOSPADM

## 2019-10-22 RX ORDER — FAMOTIDINE 10 MG/ML
20 INJECTION INTRAVENOUS
Status: COMPLETED | OUTPATIENT
Start: 2019-10-22 | End: 2019-10-22

## 2019-10-22 RX ORDER — HEPARIN 100 UNIT/ML
500 SYRINGE INTRAVENOUS
Status: CANCELLED | OUTPATIENT
Start: 2019-10-22

## 2019-10-22 RX ORDER — EPINEPHRINE 0.3 MG/.3ML
0.3 INJECTION SUBCUTANEOUS ONCE AS NEEDED
Status: CANCELLED | OUTPATIENT
Start: 2019-10-22

## 2019-10-22 RX ORDER — FAMOTIDINE 10 MG/ML
20 INJECTION INTRAVENOUS
Status: CANCELLED | OUTPATIENT
Start: 2019-10-22

## 2019-10-22 RX ORDER — DIPHENHYDRAMINE HYDROCHLORIDE 50 MG/ML
50 INJECTION INTRAMUSCULAR; INTRAVENOUS ONCE AS NEEDED
Status: CANCELLED | OUTPATIENT
Start: 2019-10-22

## 2019-10-22 RX ORDER — DIPHENHYDRAMINE HYDROCHLORIDE 50 MG/ML
50 INJECTION INTRAMUSCULAR; INTRAVENOUS ONCE AS NEEDED
Status: COMPLETED | OUTPATIENT
Start: 2019-10-22 | End: 2019-10-22

## 2019-10-22 RX ADMIN — HYDROCORTISONE SODIUM SUCCINATE 100 MG: 100 INJECTION, POWDER, FOR SOLUTION INTRAMUSCULAR; INTRAVENOUS at 02:10

## 2019-10-22 RX ADMIN — MEPERIDINE HYDROCHLORIDE 25 MG: 50 INJECTION, SOLUTION INTRAMUSCULAR; INTRAVENOUS; SUBCUTANEOUS at 03:10

## 2019-10-22 RX ADMIN — DIPHENHYDRAMINE HYDROCHLORIDE 50 MG: 50 INJECTION INTRAMUSCULAR; INTRAVENOUS at 01:10

## 2019-10-22 RX ADMIN — DEXAMETHASONE SODIUM PHOSPHATE: 10 INJECTION, SOLUTION INTRAMUSCULAR; INTRAVENOUS at 01:10

## 2019-10-22 RX ADMIN — FAMOTIDINE 20 MG: 10 INJECTION INTRAVENOUS at 01:10

## 2019-10-22 RX ADMIN — PACLITAXEL 90 MG: 100 INJECTION INTRAVENOUS at 02:10

## 2019-10-22 NOTE — TELEPHONE ENCOUNTER
Called patient's  Wife.  Gave her update. Had to stop taxol because of allergic reaction.  Patient scheduled to see me next week

## 2019-10-22 NOTE — PLAN OF CARE
"~8min into Taxol infusion pt stated he had a "funny taste in his mouth" and was starting to see "white floaters". Pt subsequently became flushed with complaint of "feeling hot" and SOB. Taxol stopped, IVF infusing. VS obtained. 2L O2 via NC applied. 100mg Solucortef given. Dr. Cruz notified. ~8min after Taxol infusion was stopped, patient stated he was no longer SOB or feeling as hot. Dr. Cruz at chairside. Per provider do not re-challenge with Taxol or give Carbo today. Pt to see provider in clinic next week. Pt agreeable to plan of care. Pt then complained of feeling "chilled" and was noted to have fine tremors/shivering. Pt afebrile. Warm blanket applied, 25mg Demerol given. ~9min after administration, pt stated feeling better and is just tired. Pt's wife notified of situation and plan of care, questions answered.    VSS at d/c. AVS given. Pt d/c home in stable condition, in NAD, with instructions to return 10/29/19 for Dr. Cruz appt. In interim, pt knows to call clinic with any issues. Reaction precautions reviewed, pt and wife stated understanding.    "

## 2019-10-25 NOTE — PROGRESS NOTES
Chief Complaint :   Lung cancer    Hx of Present illness :  Patient is a 58 y.o. year old male who presents to the clinic today for   Chemotherapy followup.  Found to have obstructing lesion left main stem bronchus. 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  Started on  on concurrent chemoradiation.  Had two weeks of radiation. Symptomatic relief. Had allergic reaction to taxol week 2. Here to discuss management.     Allergies :    Review of patient's allergies indicates:  No Known Allergies    Occupation :  .     Transfusion :  None    Menstrual & obstetric Hx :  N/A      Present Meds :   Medication List with Changes/Refills   Current Medications    CHLORHEXIDINE (PERIDEX) 0.12 % SOLUTION    U 5 ML PO BID FOR 1 MINUTE.  DO NOT SWALLOW.  DO NOT EAT OR DRINK FOR 1 HOUR AFTER USAGE.    FERROUS GLUCONATE (FERGON) 324 MG TABLET    Take 1 tablet (324 mg total) by mouth daily with breakfast.    HYDROCODONE-CHLORPHENIRAMINE (TUSSIONEX) 10-8 MG/5 ML SUSPENSION    Take 5 mLs by mouth every 12 (twelve) hours as needed for Cough.    IBUPROFEN (ADVIL,MOTRIN) 800 MG TABLET    TK 1 T PO Q 6 H PRN P    NICOTINE (NICODERM CQ) 21 MG/24 HR    Place 1 patch onto the skin every 24 hours.    ONDANSETRON (ZOFRAN) 8 MG TABLET    Take 1 tablet (8 mg total) by mouth every 8 (eight) hours as needed for Nausea.    OXYCODONE-ACETAMINOPHEN (PERCOCET) 5-325 MG PER TABLET    Take 1 tablet by mouth every 6 (six) hours as needed for Pain.    PROCHLORPERAZINE (COMPAZINE) 10 MG TABLET    Take 1 tablet (10 mg total) by mouth every 6 (six) hours as needed.       Past Medical Hx :  No past hyx of DM,Hypertension, PUD, Hepatitis, liver disease, thyroid dysfuntion, CVA, seizure disorder. no9 hx of DVT or P)E>     Past Medical Hx :  Past Medical History:   Diagnosis Date    Hypertension     Lung mass     left lung       Travel Hx :  N/A    Immunization :  Immunization  History   Administered Date(s) Administered    Pneumococcal Conjugate - 13 Valent 08/24/2019    Tdap 09/03/2019       Family Hx :  Family History   Problem Relation Age of Onset    Diabetes Mother     Heart defect Mother     Cancer Father     Cancer Sister     No Known Problems Son        Social Hx :  Started smoking  As a teen ager.  SMOKES   ONE ppd.  Social History     Socioeconomic History    Marital status:      Spouse name: Not on file    Number of children: Not on file    Years of education: Not on file    Highest education level: Not on file   Occupational History    Not on file   Social Needs    Financial resource strain: Not very hard    Food insecurity:     Worry: Never true     Inability: Never true    Transportation needs:     Medical: No     Non-medical: No   Tobacco Use    Smoking status: Current Every Day Smoker     Packs/day: 1.00     Years: 25.00     Pack years: 25.00     Types: Cigarettes    Smokeless tobacco: Never Used   Substance and Sexual Activity    Alcohol use: Not on file     Comment: 8/30/19- quit 3 weeks ago    Drug use: Never    Sexual activity: Yes     Partners: Female   Lifestyle    Physical activity:     Days per week: 0 days     Minutes per session: 0 min    Stress: To some extent   Relationships    Social connections:     Talks on phone: Once a week     Gets together: Once a week     Attends Yazidism service: Not on file     Active member of club or organization: No     Attends meetings of clubs or organizations: Never     Relationship status:    Other Topics Concern    Not on file   Social History Narrative    Not on file       Surgery :  Bronchoscopy. Has not had colonoscopy.    Symptoms :    Review of Systems   Constitutional: Positive for malaise/fatigue. Negative for chills, diaphoresis, fever and weight loss.        Appetite  Fair. Has gained nine poun ds   HENT: Negative for congestion, hearing loss, nosebleeds, sore throat and  tinnitus.    Eyes: Negative for blurred vision, double vision and photophobia.   Respiratory: Negative for cough, hemoptysis, sputum production, shortness of breath and wheezing.    Cardiovascular: Negative for chest pain, palpitations, orthopnea, claudication and leg swelling.   Gastrointestinal: Negative for abdominal pain, blood in stool, constipation, diarrhea, heartburn, nausea and vomiting.   Genitourinary: Negative for dysuria, flank pain, frequency, hematuria and urgency.   Musculoskeletal: Positive for back pain. Negative for falls, joint pain, myalgias and neck pain.   Skin: Negative for itching and rash.   Neurological: Negative for dizziness, tingling, tremors, sensory change and headaches.   Endo/Heme/Allergies: Negative for environmental allergies and polydipsia. Does not bruise/bleed easily.   Psychiatric/Behavioral: Negative for depression and memory loss. The patient is not nervous/anxious and does not have insomnia.        Physical Exam :  Wife, sisters and brother in law present in the room.  Physical Exam   Constitutional: He is oriented to person, place, and time and well-developed, well-nourished, and in no distress. Vital signs are normal. No distress.   HENT:   Head: Normocephalic and atraumatic.   Right Ear: External ear normal.   Left Ear: External ear normal.   Nose: Nose normal.   Mouth/Throat: Oropharynx is clear and moist. No oropharyngeal exudate.   Eyes: Pupils are equal, round, and reactive to light. Conjunctivae, EOM and lids are normal. Lids are everted and swept, no foreign bodies found. Right eye exhibits no discharge. Left eye exhibits no discharge.   Neck: Trachea normal, normal range of motion, full passive range of motion without pain and phonation normal. Neck supple. Normal carotid pulses, no hepatojugular reflux and no JVD present. Carotid bruit is not present. No tracheal deviation present. No thyroid mass and no thyromegaly present.   Cardiovascular: Normal rate, regular  rhythm, normal heart sounds, intact distal pulses and normal pulses. PMI is not displaced. Exam reveals no gallop and no friction rub.   No murmur heard.  Pulmonary/Chest: Effort normal and breath sounds normal. No stridor. No apnea. No respiratory distress. He has no wheezes. He has no rales. He exhibits no tenderness.       Abdominal: Soft. Normal appearance, normal aorta and bowel sounds are normal. He exhibits no distension and no mass. There is no hepatosplenomegaly. There is no tenderness. There is no rebound, no guarding and no CVA tenderness. No hernia.   Musculoskeletal: Normal range of motion. He exhibits no edema, tenderness or deformity.   Lymphadenopathy:        Head (right side): No submental, no submandibular, no tonsillar, no preauricular, no posterior auricular and no occipital adenopathy present.        Head (left side): No submental, no submandibular, no tonsillar, no preauricular, no posterior auricular and no occipital adenopathy present.     He has no cervical adenopathy.     He has no axillary adenopathy.        Right: No inguinal, no supraclavicular and no epitrochlear adenopathy present.        Left: No inguinal, no supraclavicular and no epitrochlear adenopathy present.   Neurological: He is alert and oriented to person, place, and time. He has normal sensation, normal strength, normal reflexes and intact cranial nerves. No cranial nerve deficit. He exhibits normal muscle tone. Gait normal. Coordination normal. GCS score is 15.   Skin: Skin is warm, dry and intact. No rash noted. He is not diaphoretic. No cyanosis or erythema. No pallor. Nails show no clubbing.   Psychiatric: Mood, memory, affect and judgment normal.   Nursing note and vitals reviewed.    Labs & Imaging :  10/14/19 :  NFBS 81; Cr.0.7; ca 9.4; bili 0.3. Liver enzymes normal. hgb 12.2; hct 39.2; MCV 86.  343,000. ANC 7,200   5 % of tumor Cells were positive for PD-l 1.  10/29/19 : NFBS 139; Cr.0.2. Ca 10.0. Bili 0.7. Liver  enzymes normal.; eGFR >60. hgb 13.6; hct 44.8.  MCV 90. Platelets 344,000 ANC 4,900.    Dx : Chemotherapy followup.  Poorly differentiated Squamous Cell carcinoma Left Upper Lobe. Mets to right ribs      Assessment & Plan:  Reviewed with patient, spouse and daughter.  Recommend Immunotherapy with single agent Keytruda, malathi yhrsavita weeks,  Attempt to control the cancer. Not v a cure. All potential and reasonable side effects including but not limited to  Colitis, bowel perforation, Pneumonitis, nephritis and auto immune phenomenon reviewed.  No guarantee can be given. . Consent obtained. Pamphlets given. Get authorization and initiate Rx.

## 2019-10-28 ENCOUNTER — LAB VISIT (OUTPATIENT)
Dept: LAB | Facility: HOSPITAL | Age: 59
End: 2019-10-28
Attending: INTERNAL MEDICINE
Payer: COMMERCIAL

## 2019-10-28 DIAGNOSIS — C34.02 LUNG CANCER, MAIN BRONCHUS, LEFT: ICD-10-CM

## 2019-10-28 LAB
ALBUMIN SERPL BCP-MCNC: 3.4 G/DL (ref 3.5–5.2)
ALP SERPL-CCNC: 104 U/L (ref 55–135)
ALT SERPL W/O P-5'-P-CCNC: 15 U/L (ref 10–44)
ANION GAP SERPL CALC-SCNC: 6 MMOL/L (ref 8–16)
AST SERPL-CCNC: 14 U/L (ref 10–40)
BILIRUB SERPL-MCNC: 0.2 MG/DL (ref 0.1–1)
BUN SERPL-MCNC: 7 MG/DL (ref 6–20)
CALCIUM SERPL-MCNC: 10 MG/DL (ref 8.7–10.5)
CHLORIDE SERPL-SCNC: 99 MMOL/L (ref 95–110)
CO2 SERPL-SCNC: 32 MMOL/L (ref 23–29)
CREAT SERPL-MCNC: 0.7 MG/DL (ref 0.5–1.4)
ERYTHROCYTE [DISTWIDTH] IN BLOOD BY AUTOMATED COUNT: 22.2 % (ref 11.5–14.5)
EST. GFR  (AFRICAN AMERICAN): >60 ML/MIN/1.73 M^2
EST. GFR  (NON AFRICAN AMERICAN): >60 ML/MIN/1.73 M^2
GLUCOSE SERPL-MCNC: 139 MG/DL (ref 70–110)
HCT VFR BLD AUTO: 44.8 % (ref 40–54)
HGB BLD-MCNC: 13.6 G/DL (ref 14–18)
MCH RBC QN AUTO: 27.2 PG (ref 27–31)
MCHC RBC AUTO-ENTMCNC: 30.4 G/DL (ref 32–36)
MCV RBC AUTO: 90 FL (ref 82–98)
NEUTROPHILS # BLD AUTO: 4.9 K/UL (ref 1.8–7.7)
PLATELET # BLD AUTO: 344 K/UL (ref 150–350)
PMV BLD AUTO: 9.3 FL (ref 9.2–12.9)
POTASSIUM SERPL-SCNC: 4.9 MMOL/L (ref 3.5–5.1)
PROT SERPL-MCNC: 7.4 G/DL (ref 6–8.4)
RBC # BLD AUTO: 5 M/UL (ref 4.6–6.2)
SODIUM SERPL-SCNC: 137 MMOL/L (ref 136–145)
WBC # BLD AUTO: 7.71 K/UL (ref 3.9–12.7)

## 2019-10-28 PROCEDURE — 85027 COMPLETE CBC AUTOMATED: CPT

## 2019-10-28 PROCEDURE — 80053 COMPREHEN METABOLIC PANEL: CPT

## 2019-10-28 PROCEDURE — 36415 COLL VENOUS BLD VENIPUNCTURE: CPT | Mod: PO

## 2019-10-29 ENCOUNTER — OFFICE VISIT (OUTPATIENT)
Dept: HEMATOLOGY/ONCOLOGY | Facility: CLINIC | Age: 59
End: 2019-10-29
Payer: COMMERCIAL

## 2019-10-29 VITALS
OXYGEN SATURATION: 98 % | HEART RATE: 111 BPM | BODY MASS INDEX: 26.55 KG/M2 | HEIGHT: 71 IN | TEMPERATURE: 98 F | WEIGHT: 189.63 LBS

## 2019-10-29 DIAGNOSIS — C34.02 LUNG CANCER, MAIN BRONCHUS, LEFT: Primary | ICD-10-CM

## 2019-10-29 DIAGNOSIS — Z09 CHEMOTHERAPY FOLLOW-UP EXAMINATION: ICD-10-CM

## 2019-10-29 DIAGNOSIS — C79.51 SECONDARY MALIGNANT NEOPLASM OF BONE: ICD-10-CM

## 2019-10-29 PROCEDURE — 99215 OFFICE O/P EST HI 40 MIN: CPT | Mod: S$GLB,,, | Performed by: INTERNAL MEDICINE

## 2019-10-29 PROCEDURE — 3008F PR BODY MASS INDEX (BMI) DOCUMENTED: ICD-10-PCS | Mod: CPTII,S$GLB,, | Performed by: INTERNAL MEDICINE

## 2019-10-29 PROCEDURE — 3008F BODY MASS INDEX DOCD: CPT | Mod: CPTII,S$GLB,, | Performed by: INTERNAL MEDICINE

## 2019-10-29 PROCEDURE — 99999 PR PBB SHADOW E&M-EST. PATIENT-LVL III: CPT | Mod: PBBFAC,,, | Performed by: INTERNAL MEDICINE

## 2019-10-29 PROCEDURE — 99215 PR OFFICE/OUTPT VISIT, EST, LEVL V, 40-54 MIN: ICD-10-PCS | Mod: S$GLB,,, | Performed by: INTERNAL MEDICINE

## 2019-10-29 PROCEDURE — 99999 PR PBB SHADOW E&M-EST. PATIENT-LVL III: ICD-10-PCS | Mod: PBBFAC,,, | Performed by: INTERNAL MEDICINE

## 2019-10-29 RX ORDER — SODIUM CHLORIDE 0.9 % (FLUSH) 0.9 %
10 SYRINGE (ML) INJECTION
Status: CANCELLED | OUTPATIENT
Start: 2019-10-31

## 2019-10-29 RX ORDER — HEPARIN 100 UNIT/ML
500 SYRINGE INTRAVENOUS
Status: CANCELLED | OUTPATIENT
Start: 2019-10-31

## 2019-10-29 NOTE — PATIENT INSTRUCTIONS
Pembrolizumab injection  What is this medicine?  PEMBROLIZUMAB (pem broe bryon ue mab) is a monoclonal antibody. It is used to treat melanoma, head and neck cancer, and non-small cell lung cancer.  How should I use this medicine?  This medicine is for infusion into a vein. It is given by a health care professional in a hospital or clinic setting.  A special MedGuide will be given to you before each treatment. Be sure to read this information carefully each time.  Talk to your pediatrician regarding the use of this medicine in children. Special care may be needed.  What side effects may I notice from receiving this medicine?  Side effects that you should report to your doctor or health care professional as soon as possible:  · allergic reactions like skin rash, itching or hives, swelling of the face, lips, or tongue  · bloody or black, tarry stools  · breathing problems  · change in the amount of urine  · changes in vision  · chest pain  · chills  · dark urine  · dizziness or feeling faint or lightheaded  · fast or irregular heartbeat  · fever  · flushing  · hair loss  · muscle pain  · muscle weakness  · persistent headache  · signs and symptoms of high blood sugar such as dizziness; dry mouth; dry skin; fruity breath; nausea; stomach pain; increased hunger or thirst; increased urination  · signs and symptoms of liver injury like dark urine, light-colored stools, loss of appetite, nausea, right upper belly pain, yellowing of the eyes or skin  · stomach pain  · weight loss  Side effects that usually do not require medical attention (Report these to your doctor or health care professional if they continue or are bothersome.):constipation  · cough  · diarrhea  · joint pain  · tiredness  What may interact with this medicine?  Interactions have not been studied.  Give your health care provider a list of all the medicines, herbs, non-prescription drugs, or dietary supplements you use. Also tell them if you smoke, drink  alcohol, or use illegal drugs. Some items may interact with your medicine.  What if I miss a dose?  It is important not to miss your dose. Call your doctor or health care professional if you are unable to keep an appointment.  Where should I keep my medicine?  This drug is given in a hospital or clinic and will not be stored at home.  What should I tell my health care provider before I take this medicine?  They need to know if you have any of these conditions:  · diabetes  · immune system problems  · inflammatory bowel disease  · liver disease  · lung or breathing disease  · lupus  · an unusual or allergic reaction to pembrolizumab, other medicines, foods, dyes, or preservatives  · pregnant or trying to get pregnant  · breast-feeding  What should I watch for while using this medicine?  Your condition will be monitored carefully while you are receiving this medicine.  You may need blood work done while you are taking this medicine.  Do not become pregnant while taking this medicine or for 4 months after stopping it. Women should inform their doctor if they wish to become pregnant or think they might be pregnant. There is a potential for serious side effects to an unborn child. Talk to your health care professional or pharmacist for more information. Do not breast-feed an infant while taking this medicine or for 4 months after the last dose.  NOTE:This sheet is a summary. It may not cover all possible information. If you have questions about this medicine, talk to your doctor, pharmacist, or health care provider. Copyright© 2017 [employer]

## 2019-10-29 NOTE — Clinical Note
Please give pamphlet for keytruda. Consent obtained. Labs done 10/28/19. To get auth and initiate Rx

## 2019-10-29 NOTE — PLAN OF CARE
START OFF PATHWAY REGIMEN - Non-Small Cell Lung            Pembrolizumab (Keytruda(R))           Additional Orders: Severe immune-mediated reactions can occur. See   prescribing information for more details and required immediate management with   steroids. Monitor thyroid, renal, liver function tests, glucose, and sodium at   baseline and before each dose of pembrolizumab. Ref: Keytruda(R) (pembrolizumab)   prescribing information, 2016.    **Always confirm dose/schedule in your pharmacy ordering system**    Patient Characteristics:  Stage IV and No Distant Metastasis  AJCC T Category: T3  Current Disease Status: No Distant Mets or Local Recurrence  AJCC N Category: NX  AJCC M Category: M1  AJCC 8 Stage Grouping: IV  Intent of Therapy:  Non-Curative / Palliative Intent, Discussed with Patient

## 2019-10-31 ENCOUNTER — INFUSION (OUTPATIENT)
Dept: INFUSION THERAPY | Facility: HOSPITAL | Age: 59
End: 2019-10-31
Attending: INTERNAL MEDICINE
Payer: COMMERCIAL

## 2019-10-31 VITALS
OXYGEN SATURATION: 100 % | TEMPERATURE: 97 F | DIASTOLIC BLOOD PRESSURE: 79 MMHG | SYSTOLIC BLOOD PRESSURE: 144 MMHG | HEART RATE: 88 BPM | RESPIRATION RATE: 17 BRPM

## 2019-10-31 DIAGNOSIS — C79.51 SECONDARY MALIGNANT NEOPLASM OF BONE: Primary | ICD-10-CM

## 2019-10-31 DIAGNOSIS — C34.02 LUNG CANCER, MAIN BRONCHUS, LEFT: ICD-10-CM

## 2019-10-31 PROCEDURE — 63600175 PHARM REV CODE 636 W HCPCS: Performed by: INTERNAL MEDICINE

## 2019-10-31 PROCEDURE — 96413 CHEMO IV INFUSION 1 HR: CPT

## 2019-10-31 RX ADMIN — SODIUM CHLORIDE 200 MG: 9 INJECTION, SOLUTION INTRAVENOUS at 12:10

## 2019-10-31 NOTE — PLAN OF CARE
Pt arrived to unit for first dose of Keytruda. Consent in chart. Reviewed possible side effects and pt verbalized understanding. Pt given handout from [email or web address]. VSS. Tolerated Keytruda. No reactions noted. Pt monitored post infusion and no reactions noted. AVS given to pt. Pt ambulated off unit, accompanied by wife. No distress noted.

## 2019-11-08 NOTE — PROGRESS NOTES
Chief Complaint :   Lung cancer    Hx of Present illness :  Patient is a 59 y.o. year old male who presents to the clinic today for   Chemotherapy followup.  Found to have obstructing lesion left main stem bronchus. 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  Started on  on concurrent chemoradiation.  Had two weeks of radiation. Symptomatic relief. Had allergic reaction to taxol week 2.  5 % of tumor cells were positive for PDL - 1.  Started on keytruda monotherapy     Allergies :    Review of patient's allergies indicates:  No Known Allergies    Occupation :  .     Transfusion :  None    Menstrual & obstetric Hx :  N/A      Present Meds :   Medication List with Changes/Refills   Current Medications    CHLORHEXIDINE (PERIDEX) 0.12 % SOLUTION    U 5 ML PO BID FOR 1 MINUTE.  DO NOT SWALLOW.  DO NOT EAT OR DRINK FOR 1 HOUR AFTER USAGE.    FERROUS GLUCONATE (FERGON) 324 MG TABLET    Take 1 tablet (324 mg total) by mouth daily with breakfast.    HYDROCODONE-CHLORPHENIRAMINE (TUSSIONEX) 10-8 MG/5 ML SUSPENSION    Take 5 mLs by mouth every 12 (twelve) hours as needed for Cough.    IBUPROFEN (ADVIL,MOTRIN) 800 MG TABLET    TK 1 T PO Q 6 H PRN P    NICOTINE (NICODERM CQ) 21 MG/24 HR    Place 1 patch onto the skin every 24 hours.    ONDANSETRON (ZOFRAN) 8 MG TABLET    Take 1 tablet (8 mg total) by mouth every 8 (eight) hours as needed for Nausea.    OXYCODONE-ACETAMINOPHEN (PERCOCET) 5-325 MG PER TABLET    Take 1 tablet by mouth every 6 (six) hours as needed for Pain.    PROCHLORPERAZINE (COMPAZINE) 10 MG TABLET    Take 1 tablet (10 mg total) by mouth every 6 (six) hours as needed.       Past Medical Hx :  No past hyx of DM,Hypertension, PUD, Hepatitis, liver disease, thyroid dysfuntion, CVA, seizure disorder. no9 hx of DVT or P)E>     Past Medical Hx :  Past Medical History:   Diagnosis Date    Hypertension     Lung mass     left lung        Travel Hx :  N/A    Immunization :  Immunization History   Administered Date(s) Administered    Pneumococcal Conjugate - 13 Valent 08/24/2019    Tdap 09/03/2019       Family Hx :  Family History   Problem Relation Age of Onset    Diabetes Mother     Heart defect Mother     Cancer Father     Cancer Sister     No Known Problems Son        Social Hx :  Started smoking  As a teen ager.  SMOKES   ONE ppd.  Social History     Socioeconomic History    Marital status:      Spouse name: Not on file    Number of children: Not on file    Years of education: Not on file    Highest education level: Not on file   Occupational History    Not on file   Social Needs    Financial resource strain: Not very hard    Food insecurity:     Worry: Never true     Inability: Never true    Transportation needs:     Medical: No     Non-medical: No   Tobacco Use    Smoking status: Current Every Day Smoker     Packs/day: 1.00     Years: 25.00     Pack years: 25.00     Types: Cigarettes    Smokeless tobacco: Never Used   Substance and Sexual Activity    Alcohol use: Not on file     Comment: 8/30/19- quit 3 weeks ago    Drug use: Never    Sexual activity: Yes     Partners: Female   Lifestyle    Physical activity:     Days per week: 0 days     Minutes per session: 0 min    Stress: To some extent   Relationships    Social connections:     Talks on phone: Once a week     Gets together: Once a week     Attends Restoration service: Not on file     Active member of club or organization: No     Attends meetings of clubs or organizations: Never     Relationship status:    Other Topics Concern    Not on file   Social History Narrative    Not on file       Surgery :  Bronchoscopy. Has not had colonoscopy.    Symptoms :    Review of Systems   Constitutional: Negative for chills, diaphoresis, fever, malaise/fatigue and weight loss.        Appetite  Fair. Has gained  Another six pounds.   HENT: Negative for congestion,  hearing loss, nosebleeds, sore throat and tinnitus.    Eyes: Negative for blurred vision, double vision and photophobia.   Respiratory: Negative for cough, hemoptysis, sputum production, shortness of breath and wheezing.    Cardiovascular: Negative for chest pain, palpitations, orthopnea, claudication and leg swelling.   Gastrointestinal: Negative for abdominal pain, blood in stool, constipation, diarrhea, heartburn, nausea and vomiting.   Genitourinary: Negative.  Negative for dysuria, flank pain, frequency, hematuria and urgency.   Musculoskeletal: Positive for back pain. Negative for falls, joint pain, myalgias and neck pain.   Skin: Negative for itching and rash.   Neurological: Negative for dizziness, tingling, tremors, sensory change and headaches.   Endo/Heme/Allergies: Negative for environmental allergies and polydipsia. Does not bruise/bleed easily.   Psychiatric/Behavioral: Negative for depression and memory loss. The patient is not nervous/anxious and does not have insomnia.        Physical Exam :  Wife, sisters and brother in law present in the room.  Physical Exam   Constitutional: He is oriented to person, place, and time and well-developed, well-nourished, and in no distress. Vital signs are normal. No distress.   HENT:   Head: Normocephalic and atraumatic.   Right Ear: External ear normal.   Left Ear: External ear normal.   Nose: Nose normal.   Mouth/Throat: Oropharynx is clear and moist. No oropharyngeal exudate.   Eyes: Pupils are equal, round, and reactive to light. Conjunctivae, EOM and lids are normal. Lids are everted and swept, no foreign bodies found. Right eye exhibits no discharge. Left eye exhibits no discharge.   Neck: Trachea normal, normal range of motion, full passive range of motion without pain and phonation normal. Neck supple. Normal carotid pulses, no hepatojugular reflux and no JVD present. Carotid bruit is not present. No tracheal deviation present. No thyroid mass and no  thyromegaly present.   Cardiovascular: Normal rate, regular rhythm, normal heart sounds, intact distal pulses and normal pulses. PMI is not displaced. Exam reveals no gallop and no friction rub.   No murmur heard.  Pulmonary/Chest: Effort normal and breath sounds normal. No stridor. No apnea. No respiratory distress. He has no wheezes. He has no rales. He exhibits no tenderness.       Abdominal: Soft. Normal appearance, normal aorta and bowel sounds are normal. He exhibits no distension and no mass. There is no hepatosplenomegaly. There is no tenderness. There is no rebound, no guarding and no CVA tenderness. No hernia.   Musculoskeletal: Normal range of motion. He exhibits no edema, tenderness or deformity.   Lymphadenopathy:        Head (right side): No submental, no submandibular, no tonsillar, no preauricular, no posterior auricular and no occipital adenopathy present.        Head (left side): No submental, no submandibular, no tonsillar, no preauricular, no posterior auricular and no occipital adenopathy present.     He has no cervical adenopathy.     He has no axillary adenopathy.        Right: No inguinal, no supraclavicular and no epitrochlear adenopathy present.        Left: No inguinal, no supraclavicular and no epitrochlear adenopathy present.   Neurological: He is alert and oriented to person, place, and time. He has normal sensation, normal strength, normal reflexes and intact cranial nerves. No cranial nerve deficit. He exhibits normal muscle tone. Gait normal. Coordination normal. GCS score is 15.   Skin: Skin is warm, dry and intact. No rash noted. He is not diaphoretic. No cyanosis or erythema. No pallor. Nails show no clubbing.   Psychiatric: Mood, memory, affect and judgment normal.   Nursing note and vitals reviewed.    Labs & Imaging :  11/11/19 :  NFBS 108; Cr.0.7; eGFR > 60. Ca 9.9; bili 0.7; Liver enzymes normal.  Hgb 14.4l; hct 46.4; MCV 90; Platelets 230,000 ANC 5,600.     Dx : Chemotherapy  followup.  Poorly differentiated Squamous Cell carcinoma Left Upper Lobe. Mets to right ribs      Assessment & Plan:  Reviewed with patient, spouse. Cleared for cycle 2. RTC 4 weeks with CBC,CMP

## 2019-11-11 ENCOUNTER — LAB VISIT (OUTPATIENT)
Dept: LAB | Facility: HOSPITAL | Age: 59
End: 2019-11-11
Attending: INTERNAL MEDICINE
Payer: COMMERCIAL

## 2019-11-11 DIAGNOSIS — C34.02 LUNG CANCER, MAIN BRONCHUS, LEFT: ICD-10-CM

## 2019-11-11 LAB
ALBUMIN SERPL BCP-MCNC: 3.8 G/DL (ref 3.5–5.2)
ALP SERPL-CCNC: 85 U/L (ref 55–135)
ALT SERPL W/O P-5'-P-CCNC: 15 U/L (ref 10–44)
ANION GAP SERPL CALC-SCNC: 7 MMOL/L (ref 8–16)
AST SERPL-CCNC: 14 U/L (ref 10–40)
BILIRUB SERPL-MCNC: 0.3 MG/DL (ref 0.1–1)
BUN SERPL-MCNC: 8 MG/DL (ref 6–20)
CALCIUM SERPL-MCNC: 9.9 MG/DL (ref 8.7–10.5)
CHLORIDE SERPL-SCNC: 101 MMOL/L (ref 95–110)
CO2 SERPL-SCNC: 31 MMOL/L (ref 23–29)
CREAT SERPL-MCNC: 0.7 MG/DL (ref 0.5–1.4)
ERYTHROCYTE [DISTWIDTH] IN BLOOD BY AUTOMATED COUNT: 21.9 % (ref 11.5–14.5)
EST. GFR  (AFRICAN AMERICAN): >60 ML/MIN/1.73 M^2
EST. GFR  (NON AFRICAN AMERICAN): >60 ML/MIN/1.73 M^2
GLUCOSE SERPL-MCNC: 108 MG/DL (ref 70–110)
HCT VFR BLD AUTO: 46.4 % (ref 40–54)
HGB BLD-MCNC: 14.4 G/DL (ref 14–18)
MCH RBC QN AUTO: 28 PG (ref 27–31)
MCHC RBC AUTO-ENTMCNC: 31 G/DL (ref 32–36)
MCV RBC AUTO: 90 FL (ref 82–98)
NEUTROPHILS # BLD AUTO: 5.6 K/UL (ref 1.8–7.7)
PLATELET # BLD AUTO: 230 K/UL (ref 150–350)
PMV BLD AUTO: 9.5 FL (ref 9.2–12.9)
POTASSIUM SERPL-SCNC: 4.7 MMOL/L (ref 3.5–5.1)
PROT SERPL-MCNC: 7.7 G/DL (ref 6–8.4)
RBC # BLD AUTO: 5.15 M/UL (ref 4.6–6.2)
SODIUM SERPL-SCNC: 139 MMOL/L (ref 136–145)
WBC # BLD AUTO: 8.81 K/UL (ref 3.9–12.7)

## 2019-11-11 PROCEDURE — 85027 COMPLETE CBC AUTOMATED: CPT | Mod: PO

## 2019-11-11 PROCEDURE — 85027 COMPLETE CBC AUTOMATED: CPT

## 2019-11-11 PROCEDURE — 80053 COMPREHEN METABOLIC PANEL: CPT

## 2019-11-11 PROCEDURE — 36415 COLL VENOUS BLD VENIPUNCTURE: CPT | Mod: PO

## 2019-11-12 ENCOUNTER — OFFICE VISIT (OUTPATIENT)
Dept: HEMATOLOGY/ONCOLOGY | Facility: CLINIC | Age: 59
End: 2019-11-12
Payer: COMMERCIAL

## 2019-11-12 VITALS
BODY MASS INDEX: 27.56 KG/M2 | WEIGHT: 196.88 LBS | HEIGHT: 71 IN | OXYGEN SATURATION: 100 % | HEART RATE: 111 BPM | SYSTOLIC BLOOD PRESSURE: 138 MMHG | TEMPERATURE: 99 F | DIASTOLIC BLOOD PRESSURE: 87 MMHG

## 2019-11-12 DIAGNOSIS — Z09 CHEMOTHERAPY FOLLOW-UP EXAMINATION: ICD-10-CM

## 2019-11-12 DIAGNOSIS — C34.02 LUNG CANCER, MAIN BRONCHUS, LEFT: Primary | ICD-10-CM

## 2019-11-12 DIAGNOSIS — C79.51 SECONDARY MALIGNANT NEOPLASM OF BONE: ICD-10-CM

## 2019-11-12 PROCEDURE — 99214 PR OFFICE/OUTPT VISIT, EST, LEVL IV, 30-39 MIN: ICD-10-PCS | Mod: S$GLB,,, | Performed by: INTERNAL MEDICINE

## 2019-11-12 PROCEDURE — 3008F PR BODY MASS INDEX (BMI) DOCUMENTED: ICD-10-PCS | Mod: CPTII,S$GLB,, | Performed by: INTERNAL MEDICINE

## 2019-11-12 PROCEDURE — 99999 PR PBB SHADOW E&M-EST. PATIENT-LVL III: ICD-10-PCS | Mod: PBBFAC,,, | Performed by: INTERNAL MEDICINE

## 2019-11-12 PROCEDURE — 99214 OFFICE O/P EST MOD 30 MIN: CPT | Mod: S$GLB,,, | Performed by: INTERNAL MEDICINE

## 2019-11-12 PROCEDURE — 3008F BODY MASS INDEX DOCD: CPT | Mod: CPTII,S$GLB,, | Performed by: INTERNAL MEDICINE

## 2019-11-12 PROCEDURE — 99999 PR PBB SHADOW E&M-EST. PATIENT-LVL III: CPT | Mod: PBBFAC,,, | Performed by: INTERNAL MEDICINE

## 2019-11-21 ENCOUNTER — INFUSION (OUTPATIENT)
Dept: INFUSION THERAPY | Facility: HOSPITAL | Age: 59
End: 2019-11-21
Attending: INTERNAL MEDICINE
Payer: COMMERCIAL

## 2019-11-21 VITALS
TEMPERATURE: 98 F | OXYGEN SATURATION: 100 % | HEART RATE: 110 BPM | SYSTOLIC BLOOD PRESSURE: 127 MMHG | RESPIRATION RATE: 18 BRPM | DIASTOLIC BLOOD PRESSURE: 82 MMHG

## 2019-11-21 DIAGNOSIS — C79.51 SECONDARY MALIGNANT NEOPLASM OF BONE: Primary | ICD-10-CM

## 2019-11-21 DIAGNOSIS — C34.02 LUNG CANCER, MAIN BRONCHUS, LEFT: ICD-10-CM

## 2019-11-21 PROCEDURE — 63600175 PHARM REV CODE 636 W HCPCS: Mod: JG | Performed by: INTERNAL MEDICINE

## 2019-11-21 PROCEDURE — 96413 CHEMO IV INFUSION 1 HR: CPT

## 2019-11-21 RX ORDER — SODIUM CHLORIDE 0.9 % (FLUSH) 0.9 %
10 SYRINGE (ML) INJECTION
Status: CANCELLED | OUTPATIENT
Start: 2019-11-21

## 2019-11-21 RX ORDER — HEPARIN 100 UNIT/ML
500 SYRINGE INTRAVENOUS
Status: CANCELLED | OUTPATIENT
Start: 2019-11-21

## 2019-11-21 RX ADMIN — SODIUM CHLORIDE 200 MG: 9 INJECTION, SOLUTION INTRAVENOUS at 11:11

## 2019-11-21 NOTE — PLAN OF CARE
Patient arrived to unit for C2 Keytruda. Patient denies any new or worsening symptoms at this time. Plan of care reviewed, patient agreeable to plan. Patient tolerated treatment well. No sign of reaction noted. VSS. Patient received discharge instructions and follow up appointments. Patient instructed to return 12/12/19 for cycle 3. Verbalized understanding and ambulated unassisted off unit accompanied by his wife. Patient in NAD at time of discharge.

## 2019-12-09 ENCOUNTER — LAB VISIT (OUTPATIENT)
Dept: LAB | Facility: HOSPITAL | Age: 59
End: 2019-12-09
Attending: INTERNAL MEDICINE
Payer: COMMERCIAL

## 2019-12-09 DIAGNOSIS — Z09 CHEMOTHERAPY FOLLOW-UP EXAMINATION: ICD-10-CM

## 2019-12-09 DIAGNOSIS — C79.51 SECONDARY MALIGNANT NEOPLASM OF BONE: ICD-10-CM

## 2019-12-09 DIAGNOSIS — C34.02 LUNG CANCER, MAIN BRONCHUS, LEFT: ICD-10-CM

## 2019-12-09 LAB
ALBUMIN SERPL BCP-MCNC: 3.8 G/DL (ref 3.5–5.2)
ALP SERPL-CCNC: 74 U/L (ref 55–135)
ALT SERPL W/O P-5'-P-CCNC: 17 U/L (ref 10–44)
ANION GAP SERPL CALC-SCNC: 9 MMOL/L (ref 8–16)
AST SERPL-CCNC: 16 U/L (ref 10–40)
BASOPHILS # BLD AUTO: 0.09 K/UL (ref 0–0.2)
BASOPHILS NFR BLD: 1.1 % (ref 0–1.9)
BILIRUB SERPL-MCNC: 0.3 MG/DL (ref 0.1–1)
BUN SERPL-MCNC: 8 MG/DL (ref 6–20)
CALCIUM SERPL-MCNC: 9.4 MG/DL (ref 8.7–10.5)
CHLORIDE SERPL-SCNC: 103 MMOL/L (ref 95–110)
CO2 SERPL-SCNC: 29 MMOL/L (ref 23–29)
CREAT SERPL-MCNC: 0.8 MG/DL (ref 0.5–1.4)
DIFFERENTIAL METHOD: ABNORMAL
EOSINOPHIL # BLD AUTO: 0.3 K/UL (ref 0–0.5)
EOSINOPHIL NFR BLD: 3.8 % (ref 0–8)
ERYTHROCYTE [DISTWIDTH] IN BLOOD BY AUTOMATED COUNT: 18.8 % (ref 11.5–14.5)
EST. GFR  (AFRICAN AMERICAN): >60 ML/MIN/1.73 M^2
EST. GFR  (NON AFRICAN AMERICAN): >60 ML/MIN/1.73 M^2
GLUCOSE SERPL-MCNC: 94 MG/DL (ref 70–110)
HCT VFR BLD AUTO: 46.2 % (ref 40–54)
HGB BLD-MCNC: 14.5 G/DL (ref 14–18)
LYMPHOCYTES # BLD AUTO: 1.5 K/UL (ref 1–4.8)
LYMPHOCYTES NFR BLD: 17.7 % (ref 18–48)
MCH RBC QN AUTO: 29.2 PG (ref 27–31)
MCHC RBC AUTO-ENTMCNC: 31.4 G/DL (ref 32–36)
MCV RBC AUTO: 93 FL (ref 82–98)
MONOCYTES # BLD AUTO: 1.1 K/UL (ref 0.3–1)
MONOCYTES NFR BLD: 13.2 % (ref 4–15)
NEUTROPHILS # BLD AUTO: 5.3 K/UL (ref 1.8–7.7)
NEUTROPHILS NFR BLD: 63.6 % (ref 38–73)
NRBC BLD-RTO: 0 /100 WBC
PLATELET # BLD AUTO: 251 K/UL (ref 150–350)
PMV BLD AUTO: 9.3 FL (ref 9.2–12.9)
POTASSIUM SERPL-SCNC: 4.4 MMOL/L (ref 3.5–5.1)
PROT SERPL-MCNC: 7.4 G/DL (ref 6–8.4)
RBC # BLD AUTO: 4.96 M/UL (ref 4.6–6.2)
SODIUM SERPL-SCNC: 141 MMOL/L (ref 136–145)
TSH SERPL DL<=0.005 MIU/L-ACNC: 0.82 UIU/ML (ref 0.4–4)
WBC # BLD AUTO: 8.32 K/UL (ref 3.9–12.7)

## 2019-12-09 PROCEDURE — 84443 ASSAY THYROID STIM HORMONE: CPT

## 2019-12-09 PROCEDURE — 85025 COMPLETE CBC W/AUTO DIFF WBC: CPT

## 2019-12-09 PROCEDURE — 36415 COLL VENOUS BLD VENIPUNCTURE: CPT | Mod: PO

## 2019-12-09 PROCEDURE — 80053 COMPREHEN METABOLIC PANEL: CPT

## 2019-12-10 ENCOUNTER — OFFICE VISIT (OUTPATIENT)
Dept: HEMATOLOGY/ONCOLOGY | Facility: CLINIC | Age: 59
End: 2019-12-10
Payer: COMMERCIAL

## 2019-12-10 VITALS
TEMPERATURE: 99 F | OXYGEN SATURATION: 99 % | SYSTOLIC BLOOD PRESSURE: 189 MMHG | HEIGHT: 71 IN | BODY MASS INDEX: 28.73 KG/M2 | WEIGHT: 205.25 LBS | DIASTOLIC BLOOD PRESSURE: 100 MMHG | HEART RATE: 105 BPM

## 2019-12-10 DIAGNOSIS — Z09 CHEMOTHERAPY FOLLOW-UP EXAMINATION: ICD-10-CM

## 2019-12-10 DIAGNOSIS — C79.51 SECONDARY MALIGNANT NEOPLASM OF BONE: ICD-10-CM

## 2019-12-10 DIAGNOSIS — C34.02 LUNG CANCER, MAIN BRONCHUS, LEFT: Primary | ICD-10-CM

## 2019-12-10 PROCEDURE — 3008F BODY MASS INDEX DOCD: CPT | Mod: CPTII,S$GLB,, | Performed by: INTERNAL MEDICINE

## 2019-12-10 PROCEDURE — 99214 PR OFFICE/OUTPT VISIT, EST, LEVL IV, 30-39 MIN: ICD-10-PCS | Mod: S$GLB,,, | Performed by: INTERNAL MEDICINE

## 2019-12-10 PROCEDURE — 99999 PR PBB SHADOW E&M-EST. PATIENT-LVL III: CPT | Mod: PBBFAC,,, | Performed by: INTERNAL MEDICINE

## 2019-12-10 PROCEDURE — 99214 OFFICE O/P EST MOD 30 MIN: CPT | Mod: S$GLB,,, | Performed by: INTERNAL MEDICINE

## 2019-12-10 PROCEDURE — 3008F PR BODY MASS INDEX (BMI) DOCUMENTED: ICD-10-PCS | Mod: CPTII,S$GLB,, | Performed by: INTERNAL MEDICINE

## 2019-12-10 PROCEDURE — 99999 PR PBB SHADOW E&M-EST. PATIENT-LVL III: ICD-10-PCS | Mod: PBBFAC,,, | Performed by: INTERNAL MEDICINE

## 2019-12-10 NOTE — PROGRESS NOTES
Chief Complaint :   Lung cancer    Hx of Present illness :  Patient is a 59 y.o. year old male who presents to the clinic today for   Chemotherapy followup.  Found to have obstructing lesion left main stem bronchus. 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  Started on  on concurrent chemoradiation.  Had two weeks of radiation. Symptomatic relief. Had allergic reaction to taxol week 2.  5 % of tumor cells were positive for PDL - 1.  Started on keytruda monotherapy . Completed cycle 2.    Allergies :    Review of patient's allergies indicates:  No Known Allergies    Occupation :  .     Transfusion :  None    Menstrual & obstetric Hx :  N/A      Present Meds :   Medication List with Changes/Refills   Current Medications    CHLORHEXIDINE (PERIDEX) 0.12 % SOLUTION    U 5 ML PO BID FOR 1 MINUTE.  DO NOT SWALLOW.  DO NOT EAT OR DRINK FOR 1 HOUR AFTER USAGE.    FERROUS GLUCONATE (FERGON) 324 MG TABLET    Take 1 tablet (324 mg total) by mouth daily with breakfast.    HYDROCODONE-CHLORPHENIRAMINE (TUSSIONEX) 10-8 MG/5 ML SUSPENSION    Take 5 mLs by mouth every 12 (twelve) hours as needed for Cough.    IBUPROFEN (ADVIL,MOTRIN) 800 MG TABLET    TK 1 T PO Q 6 H PRN P    NICOTINE (NICODERM CQ) 21 MG/24 HR    Place 1 patch onto the skin every 24 hours.    ONDANSETRON (ZOFRAN) 8 MG TABLET    Take 1 tablet (8 mg total) by mouth every 8 (eight) hours as needed for Nausea.    OXYCODONE-ACETAMINOPHEN (PERCOCET) 5-325 MG PER TABLET    Take 1 tablet by mouth every 6 (six) hours as needed for Pain.    PROCHLORPERAZINE (COMPAZINE) 10 MG TABLET    Take 1 tablet (10 mg total) by mouth every 6 (six) hours as needed.       Past Medical Hx :  No past hyx of DM,Hypertension, PUD, Hepatitis, liver disease, thyroid dysfuntion, CVA, seizure disorder. no9 hx of DVT or P)E>     Past Medical Hx :  Past Medical History:   Diagnosis Date    Hypertension     Lung  mass     left lung       Travel Hx :  N/A    Immunization :  Immunization History   Administered Date(s) Administered    Pneumococcal Conjugate - 13 Valent 08/24/2019    Tdap 09/03/2019       Family Hx :  Family History   Problem Relation Age of Onset    Diabetes Mother     Heart defect Mother     Cancer Father     Cancer Sister     No Known Problems Son        Social Hx :  Started smoking  As a teen ager.  SMOKES   ONE ppd.  Social History     Socioeconomic History    Marital status:      Spouse name: Not on file    Number of children: Not on file    Years of education: Not on file    Highest education level: Not on file   Occupational History    Not on file   Social Needs    Financial resource strain: Not very hard    Food insecurity:     Worry: Never true     Inability: Never true    Transportation needs:     Medical: No     Non-medical: No   Tobacco Use    Smoking status: Current Every Day Smoker     Packs/day: 1.00     Years: 25.00     Pack years: 25.00     Types: Cigarettes    Smokeless tobacco: Never Used   Substance and Sexual Activity    Alcohol use: Not on file     Comment: 8/30/19- quit 3 weeks ago    Drug use: Never    Sexual activity: Yes     Partners: Female   Lifestyle    Physical activity:     Days per week: 0 days     Minutes per session: 0 min    Stress: To some extent   Relationships    Social connections:     Talks on phone: Once a week     Gets together: Once a week     Attends Restorationism service: Not on file     Active member of club or organization: No     Attends meetings of clubs or organizations: Never     Relationship status:    Other Topics Concern    Not on file   Social History Narrative    Not on file       Surgery :  Bronchoscopy. Has not had colonoscopy.    Symptoms :    Review of Systems   Constitutional: Negative for chills, diaphoresis, fever, malaise/fatigue and weight loss.        Voracious appetite. Continues to gain weight.   HENT:  Positive for congestion. Negative for hearing loss, nosebleeds, sore throat and tinnitus.    Eyes: Negative for blurred vision, double vision and photophobia.   Respiratory: Negative for cough, hemoptysis, sputum production, shortness of breath and wheezing.    Cardiovascular: Negative for chest pain, palpitations, orthopnea, claudication and leg swelling.   Gastrointestinal: Negative for abdominal pain, blood in stool, constipation, diarrhea, heartburn, nausea and vomiting.   Genitourinary: Negative.  Negative for dysuria, flank pain, frequency, hematuria and urgency.   Musculoskeletal: Positive for back pain. Negative for falls, joint pain, myalgias and neck pain.   Skin: Negative for itching and rash.   Neurological: Negative for dizziness, tingling, tremors, sensory change and headaches.   Endo/Heme/Allergies: Negative for environmental allergies and polydipsia. Does not bruise/bleed easily.   Psychiatric/Behavioral: Negative for depression and memory loss. The patient is not nervous/anxious and does not have insomnia.        Physical Exam :  Wife, sisters and brother in law present in the room.  Physical Exam   Constitutional: He is oriented to person, place, and time and well-developed, well-nourished, and in no distress. Vital signs are normal. No distress.   HENT:   Head: Normocephalic and atraumatic.   Right Ear: External ear normal.   Left Ear: External ear normal.   Nose: Nose normal.   Mouth/Throat: Oropharynx is clear and moist. No oropharyngeal exudate.   Eyes: Pupils are equal, round, and reactive to light. Conjunctivae, EOM and lids are normal. Lids are everted and swept, no foreign bodies found. Right eye exhibits no discharge. Left eye exhibits no discharge.   Neck: Trachea normal, normal range of motion, full passive range of motion without pain and phonation normal. Neck supple. Normal carotid pulses, no hepatojugular reflux and no JVD present. Carotid bruit is not present. No tracheal deviation  present. No thyroid mass and no thyromegaly present.   Cardiovascular: Normal rate, regular rhythm, normal heart sounds, intact distal pulses and normal pulses. PMI is not displaced. Exam reveals no gallop and no friction rub.   No murmur heard.  Pulmonary/Chest: Effort normal and breath sounds normal. No stridor. No apnea. No respiratory distress. He has no wheezes. He has no rales. He exhibits no tenderness.       Abdominal: Soft. Normal appearance, normal aorta and bowel sounds are normal. He exhibits no distension and no mass. There is no hepatosplenomegaly. There is no tenderness. There is no rebound, no guarding and no CVA tenderness. No hernia.   Musculoskeletal: Normal range of motion. He exhibits no edema, tenderness or deformity.   Lymphadenopathy:        Head (right side): No submental, no submandibular, no tonsillar, no preauricular, no posterior auricular and no occipital adenopathy present.        Head (left side): No submental, no submandibular, no tonsillar, no preauricular, no posterior auricular and no occipital adenopathy present.     He has no cervical adenopathy.     He has no axillary adenopathy.        Right: No inguinal, no supraclavicular and no epitrochlear adenopathy present.        Left: No inguinal, no supraclavicular and no epitrochlear adenopathy present.   Neurological: He is alert and oriented to person, place, and time. He has normal sensation, normal strength, normal reflexes and intact cranial nerves. No cranial nerve deficit. He exhibits normal muscle tone. Gait normal. Coordination normal. GCS score is 15.   Skin: Skin is warm, dry and intact. No rash noted. He is not diaphoretic. No cyanosis or erythema. No pallor. Nails show no clubbing.   Psychiatric: Mood, memory, affect and judgment normal.   Nursing note and vitals reviewed.    Labs & Imaging :  12/09/19 :  TSH 0.803.         NFBS 94; Cr,0.8; Ca 9.4 Bili 0.3. Liver enzymes normal. eGFR >60. Hgb 14.5; Hct 46.1. MCV 98.  Platelets 251,000 ANC 5,100    Dx : Chemotherapy followup.  Poorly differentiated Squamous Cell carcinoma Left Upper Lobe. Mets to right ribs      Assessment & Plan:  Reviewed with patient, spouse. Cleared for cycle 3 on 12/12/19.  RTC 3 weeks with CBC,CMP, TSH, Lipids. Patient and wife  understand

## 2019-12-11 RX ORDER — SODIUM CHLORIDE 0.9 % (FLUSH) 0.9 %
10 SYRINGE (ML) INJECTION
Status: CANCELLED | OUTPATIENT
Start: 2019-12-12

## 2019-12-11 RX ORDER — HEPARIN 100 UNIT/ML
500 SYRINGE INTRAVENOUS
Status: CANCELLED | OUTPATIENT
Start: 2019-12-12

## 2019-12-12 ENCOUNTER — TELEPHONE (OUTPATIENT)
Dept: HEMATOLOGY/ONCOLOGY | Facility: CLINIC | Age: 59
End: 2019-12-12

## 2019-12-12 ENCOUNTER — INFUSION (OUTPATIENT)
Dept: INFUSION THERAPY | Facility: HOSPITAL | Age: 59
End: 2019-12-12
Attending: INTERNAL MEDICINE
Payer: COMMERCIAL

## 2019-12-12 VITALS
HEART RATE: 98 BPM | DIASTOLIC BLOOD PRESSURE: 85 MMHG | RESPIRATION RATE: 17 BRPM | SYSTOLIC BLOOD PRESSURE: 140 MMHG | OXYGEN SATURATION: 99 % | TEMPERATURE: 98 F

## 2019-12-12 DIAGNOSIS — C79.51 SECONDARY MALIGNANT NEOPLASM OF BONE: Primary | ICD-10-CM

## 2019-12-12 DIAGNOSIS — C34.02 LUNG CANCER, MAIN BRONCHUS, LEFT: ICD-10-CM

## 2019-12-12 PROCEDURE — 96413 CHEMO IV INFUSION 1 HR: CPT

## 2019-12-12 PROCEDURE — 63600175 PHARM REV CODE 636 W HCPCS: Mod: JG | Performed by: INTERNAL MEDICINE

## 2019-12-12 RX ADMIN — SODIUM CHLORIDE 200 MG: 9 INJECTION, SOLUTION INTRAVENOUS at 01:12

## 2019-12-12 NOTE — TELEPHONE ENCOUNTER
I faxed the patient FMLA paperwork and notified the patient's wife if they would like a copy they can pick it up in the office.

## 2019-12-12 NOTE — PLAN OF CARE
Pt presented for Keytruda infusion. VSS. No complaints/concerns voiced. Pt reported increased appetite and gaining back some of the weight that he lost. Tolerated Keytruda. Distress screening tool completed. Accompanied by wife. AVS provided and reviewed with pt and pt's wife. Pt ambulated unassisted off unit. Pt in NAD at time of discharge.

## 2020-01-02 ENCOUNTER — INFUSION (OUTPATIENT)
Dept: INFUSION THERAPY | Facility: HOSPITAL | Age: 60
End: 2020-01-02
Attending: INTERNAL MEDICINE
Payer: COMMERCIAL

## 2020-01-02 VITALS
DIASTOLIC BLOOD PRESSURE: 80 MMHG | SYSTOLIC BLOOD PRESSURE: 131 MMHG | TEMPERATURE: 98 F | RESPIRATION RATE: 17 BRPM | OXYGEN SATURATION: 98 % | HEART RATE: 99 BPM

## 2020-01-02 DIAGNOSIS — C79.51 SECONDARY MALIGNANT NEOPLASM OF BONE: Primary | ICD-10-CM

## 2020-01-02 DIAGNOSIS — C34.02 LUNG CANCER, MAIN BRONCHUS, LEFT: ICD-10-CM

## 2020-01-02 PROCEDURE — 63600175 PHARM REV CODE 636 W HCPCS: Mod: JG | Performed by: INTERNAL MEDICINE

## 2020-01-02 PROCEDURE — 96413 CHEMO IV INFUSION 1 HR: CPT

## 2020-01-02 RX ORDER — SODIUM CHLORIDE 0.9 % (FLUSH) 0.9 %
10 SYRINGE (ML) INJECTION
Status: CANCELLED | OUTPATIENT
Start: 2020-01-02

## 2020-01-02 RX ORDER — HEPARIN 100 UNIT/ML
500 SYRINGE INTRAVENOUS
Status: CANCELLED | OUTPATIENT
Start: 2020-01-02

## 2020-01-02 RX ADMIN — SODIUM CHLORIDE 200 MG: 9 INJECTION, SOLUTION INTRAVENOUS at 11:01

## 2020-01-02 NOTE — PLAN OF CARE
Pt presented for Cycle 4 Keytruda. Reported fatigue. VSS. Tolerated Keytruda. Distress screening tool completed. Accompanied by son. AVS provided and reviewed with pt. Pt ambulated unassisted off unit. Pt in NAD at time of discharge.

## 2020-01-20 ENCOUNTER — LAB VISIT (OUTPATIENT)
Dept: LAB | Facility: HOSPITAL | Age: 60
End: 2020-01-20
Attending: ANESTHESIOLOGY
Payer: COMMERCIAL

## 2020-01-20 DIAGNOSIS — C79.51 SECONDARY MALIGNANT NEOPLASM OF BONE: ICD-10-CM

## 2020-01-20 DIAGNOSIS — C34.02 LUNG CANCER, MAIN BRONCHUS, LEFT: ICD-10-CM

## 2020-01-20 DIAGNOSIS — Z09 CHEMOTHERAPY FOLLOW-UP EXAMINATION: ICD-10-CM

## 2020-01-20 LAB
ALBUMIN SERPL BCP-MCNC: 4.1 G/DL (ref 3.5–5.2)
ALP SERPL-CCNC: 81 U/L (ref 55–135)
ALT SERPL W/O P-5'-P-CCNC: 17 U/L (ref 10–44)
ANION GAP SERPL CALC-SCNC: 7 MMOL/L (ref 8–16)
AST SERPL-CCNC: 13 U/L (ref 10–40)
BASOPHILS # BLD AUTO: 0.08 K/UL (ref 0–0.2)
BASOPHILS NFR BLD: 0.9 % (ref 0–1.9)
BILIRUB SERPL-MCNC: 0.4 MG/DL (ref 0.1–1)
BUN SERPL-MCNC: 9 MG/DL (ref 6–20)
CALCIUM SERPL-MCNC: 9.7 MG/DL (ref 8.7–10.5)
CHLORIDE SERPL-SCNC: 102 MMOL/L (ref 95–110)
CO2 SERPL-SCNC: 33 MMOL/L (ref 23–29)
CREAT SERPL-MCNC: 0.8 MG/DL (ref 0.5–1.4)
DIFFERENTIAL METHOD: ABNORMAL
EOSINOPHIL # BLD AUTO: 0.3 K/UL (ref 0–0.5)
EOSINOPHIL NFR BLD: 3.6 % (ref 0–8)
ERYTHROCYTE [DISTWIDTH] IN BLOOD BY AUTOMATED COUNT: 14.8 % (ref 11.5–14.5)
EST. GFR  (AFRICAN AMERICAN): >60 ML/MIN/1.73 M^2
EST. GFR  (NON AFRICAN AMERICAN): >60 ML/MIN/1.73 M^2
GLUCOSE SERPL-MCNC: 113 MG/DL (ref 70–110)
HCT VFR BLD AUTO: 48.4 % (ref 40–54)
HGB BLD-MCNC: 15.7 G/DL (ref 14–18)
LYMPHOCYTES # BLD AUTO: 1.9 K/UL (ref 1–4.8)
LYMPHOCYTES NFR BLD: 20.8 % (ref 18–48)
MCH RBC QN AUTO: 31.3 PG (ref 27–31)
MCHC RBC AUTO-ENTMCNC: 32.4 G/DL (ref 32–36)
MCV RBC AUTO: 96 FL (ref 82–98)
MONOCYTES # BLD AUTO: 0.9 K/UL (ref 0.3–1)
MONOCYTES NFR BLD: 10.1 % (ref 4–15)
NEUTROPHILS # BLD AUTO: 5.8 K/UL (ref 1.8–7.7)
NEUTROPHILS NFR BLD: 64.3 % (ref 38–73)
NRBC BLD-RTO: 0 /100 WBC
PLATELET # BLD AUTO: 250 K/UL (ref 150–350)
PMV BLD AUTO: 9.6 FL (ref 9.2–12.9)
POTASSIUM SERPL-SCNC: 4.3 MMOL/L (ref 3.5–5.1)
PROT SERPL-MCNC: 7.6 G/DL (ref 6–8.4)
RBC # BLD AUTO: 5.02 M/UL (ref 4.6–6.2)
SODIUM SERPL-SCNC: 142 MMOL/L (ref 136–145)
TSH SERPL DL<=0.005 MIU/L-ACNC: 0.7 UIU/ML (ref 0.4–4)
WBC # BLD AUTO: 8.97 K/UL (ref 3.9–12.7)

## 2020-01-20 PROCEDURE — 80053 COMPREHEN METABOLIC PANEL: CPT

## 2020-01-20 PROCEDURE — 84443 ASSAY THYROID STIM HORMONE: CPT

## 2020-01-20 PROCEDURE — 36415 COLL VENOUS BLD VENIPUNCTURE: CPT | Mod: PO

## 2020-01-20 PROCEDURE — 85025 COMPLETE CBC W/AUTO DIFF WBC: CPT

## 2020-01-21 NOTE — PROGRESS NOTES
Chief Complaint :   Lung cancer    Hx of Present illness :  Patient is a 59 y.o. year old male who presents to the clinic today for   Chemotherapy followup.  Found to have obstructing lesion left main stem bronchus. 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  Started on  on concurrent chemoradiation.  Had two weeks of radiation. Symptomatic relief. Had allergic reaction to taxol week 2.  5 % of tumor cells were positive for PDL - 1.  Started on keytruda monotherapy . Completed cycle 2.    Allergies :    Review of patient's allergies indicates:  No Known Allergies    Occupation :  .     Transfusion :  None    Menstrual & obstetric Hx :  N/A      Present Meds :   Medication List with Changes/Refills   Current Medications    CHLORHEXIDINE (PERIDEX) 0.12 % SOLUTION    U 5 ML PO BID FOR 1 MINUTE.  DO NOT SWALLOW.  DO NOT EAT OR DRINK FOR 1 HOUR AFTER USAGE.    FERROUS GLUCONATE (FERGON) 324 MG TABLET    Take 1 tablet (324 mg total) by mouth daily with breakfast.    HYDROCODONE-CHLORPHENIRAMINE (TUSSIONEX) 10-8 MG/5 ML SUSPENSION    Take 5 mLs by mouth every 12 (twelve) hours as needed for Cough.    IBUPROFEN (ADVIL,MOTRIN) 800 MG TABLET    TK 1 T PO Q 6 H PRN P    NICOTINE (NICODERM CQ) 21 MG/24 HR    Place 1 patch onto the skin every 24 hours.    ONDANSETRON (ZOFRAN) 8 MG TABLET    Take 1 tablet (8 mg total) by mouth every 8 (eight) hours as needed for Nausea.    OXYCODONE-ACETAMINOPHEN (PERCOCET) 5-325 MG PER TABLET    Take 1 tablet by mouth every 6 (six) hours as needed for Pain.    PROCHLORPERAZINE (COMPAZINE) 10 MG TABLET    Take 1 tablet (10 mg total) by mouth every 6 (six) hours as needed.       Past Medical Hx :  No past hyx of DM,Hypertension, PUD, Hepatitis, liver disease, thyroid dysfuntion, CVA, seizure disorder. no9 hx of DVT or P)E>     Past Medical Hx :  Past Medical History:   Diagnosis Date    Hypertension     Lung  mass     left lung       Travel Hx :  N/A    Immunization :  Immunization History   Administered Date(s) Administered    Pneumococcal Conjugate - 13 Valent 08/24/2019    Tdap 09/03/2019       Family Hx :  Family History   Problem Relation Age of Onset    Diabetes Mother     Heart defect Mother     Cancer Father     Cancer Sister     No Known Problems Son        Social Hx :  Started smoking  As a teen ager.  SMOKES   ONE ppd.  Social History     Socioeconomic History    Marital status:      Spouse name: Not on file    Number of children: Not on file    Years of education: Not on file    Highest education level: Not on file   Occupational History    Not on file   Social Needs    Financial resource strain: Not very hard    Food insecurity:     Worry: Never true     Inability: Never true    Transportation needs:     Medical: No     Non-medical: No   Tobacco Use    Smoking status: Current Every Day Smoker     Packs/day: 1.00     Years: 25.00     Pack years: 25.00     Types: Cigarettes    Smokeless tobacco: Never Used   Substance and Sexual Activity    Alcohol use: Not on file     Comment: 8/30/19- quit 3 weeks ago    Drug use: Never    Sexual activity: Yes     Partners: Female   Lifestyle    Physical activity:     Days per week: 0 days     Minutes per session: 0 min    Stress: To some extent   Relationships    Social connections:     Talks on phone: Once a week     Gets together: Once a week     Attends Tenriism service: Not on file     Active member of club or organization: No     Attends meetings of clubs or organizations: Never     Relationship status:    Other Topics Concern    Not on file   Social History Narrative    Not on file       Surgery :  Bronchoscopy. Has not had colonoscopy.    Symptoms :    Review of Systems   Constitutional: Negative for chills, diaphoresis, fever, malaise/fatigue and weight loss.        Voracious appetite. Continues to gain weight.   HENT:  Positive for congestion. Negative for hearing loss, nosebleeds, sore throat and tinnitus.    Eyes: Negative for blurred vision, double vision and photophobia.   Respiratory: Negative for cough, hemoptysis, sputum production, shortness of breath and wheezing.    Cardiovascular: Negative for chest pain, palpitations, orthopnea, claudication and leg swelling.   Gastrointestinal: Negative for abdominal pain, blood in stool, constipation, diarrhea, heartburn, nausea and vomiting.   Genitourinary: Negative.  Negative for dysuria, flank pain, frequency, hematuria and urgency.   Musculoskeletal: Positive for back pain. Negative for falls, joint pain, myalgias and neck pain.   Skin: Negative for itching and rash.   Neurological: Negative for dizziness, tingling, tremors, sensory change and headaches.   Endo/Heme/Allergies: Negative for environmental allergies and polydipsia. Does not bruise/bleed easily.   Psychiatric/Behavioral: Negative for depression and memory loss. The patient is not nervous/anxious and does not have insomnia.        Physical Exam :  Wife, sisters and brother in law present in the room.  Physical Exam   Constitutional: He is oriented to person, place, and time and well-developed, well-nourished, and in no distress. Vital signs are normal. No distress.   HENT:   Head: Normocephalic and atraumatic.   Right Ear: External ear normal.   Left Ear: External ear normal.   Nose: Nose normal.   Mouth/Throat: Oropharynx is clear and moist. No oropharyngeal exudate.   Eyes: Pupils are equal, round, and reactive to light. Conjunctivae, EOM and lids are normal. Lids are everted and swept, no foreign bodies found. Right eye exhibits no discharge. Left eye exhibits no discharge.   Neck: Trachea normal, normal range of motion, full passive range of motion without pain and phonation normal. Neck supple. Normal carotid pulses, no hepatojugular reflux and no JVD present. Carotid bruit is not present. No tracheal deviation  present. No thyroid mass and no thyromegaly present.   Cardiovascular: Normal rate, regular rhythm, normal heart sounds, intact distal pulses and normal pulses. PMI is not displaced. Exam reveals no gallop and no friction rub.   No murmur heard.  Pulmonary/Chest: Effort normal and breath sounds normal. No stridor. No apnea. No respiratory distress. He has no wheezes. He has no rales. He exhibits no tenderness.       Abdominal: Soft. Normal appearance, normal aorta and bowel sounds are normal. He exhibits no distension and no mass. There is no hepatosplenomegaly. There is no tenderness. There is no rebound, no guarding and no CVA tenderness. No hernia.   Musculoskeletal: Normal range of motion. He exhibits no edema, tenderness or deformity.   Lymphadenopathy:        Head (right side): No submental, no submandibular, no tonsillar, no preauricular, no posterior auricular and no occipital adenopathy present.        Head (left side): No submental, no submandibular, no tonsillar, no preauricular, no posterior auricular and no occipital adenopathy present.     He has no cervical adenopathy.     He has no axillary adenopathy.        Right: No inguinal, no supraclavicular and no epitrochlear adenopathy present.        Left: No inguinal, no supraclavicular and no epitrochlear adenopathy present.   Neurological: He is alert and oriented to person, place, and time. He has normal sensation, normal strength, normal reflexes and intact cranial nerves. No cranial nerve deficit. He exhibits normal muscle tone. Gait normal. Coordination normal. GCS score is 15.   Skin: Skin is warm, dry and intact. No rash noted. He is not diaphoretic. No cyanosis or erythema. No pallor. Nails show no clubbing.   Psychiatric: Mood, memory, affect and judgment normal.   Nursing note and vitals reviewed.    Labs & Imaging :  01/20/2020 :  TSH 0.697; Hgb 15.7; hct 48.4; MCV 96; platelets 250,000. ANC 5.800; NFBS 113; Cr.0.8; ca 9.7. Bili 0.4. Liver  enzymes normal; eGFR > 60    Dx : Chemotherapy followup.  Poorly differentiated Squamous Cell carcinoma Left Upper Lobe. Mets to right ribs. Hypertension      Assessment & Plan:  Reviewed with patient, spouse. Cleared for cycle 5 on 01/23/20.  Has not taken hypertension Rx in few years. Will give Catapres 0.1 Mg at infusion. Get appt to see  today or tomorrow.   RTC 3 weeks with CBC,CMP, TSH, Lipids.and PET/CT Patient and wife  understand

## 2020-01-23 ENCOUNTER — PATIENT OUTREACH (OUTPATIENT)
Dept: ADMINISTRATIVE | Facility: HOSPITAL | Age: 60
End: 2020-01-23

## 2020-01-23 ENCOUNTER — INFUSION (OUTPATIENT)
Dept: INFUSION THERAPY | Facility: HOSPITAL | Age: 60
End: 2020-01-23
Attending: INTERNAL MEDICINE
Payer: COMMERCIAL

## 2020-01-23 ENCOUNTER — OFFICE VISIT (OUTPATIENT)
Dept: HEMATOLOGY/ONCOLOGY | Facility: CLINIC | Age: 60
End: 2020-01-23
Payer: COMMERCIAL

## 2020-01-23 VITALS
HEART RATE: 82 BPM | TEMPERATURE: 98 F | OXYGEN SATURATION: 99 % | RESPIRATION RATE: 17 BRPM | SYSTOLIC BLOOD PRESSURE: 128 MMHG | DIASTOLIC BLOOD PRESSURE: 85 MMHG

## 2020-01-23 VITALS
TEMPERATURE: 98 F | BODY MASS INDEX: 29.5 KG/M2 | SYSTOLIC BLOOD PRESSURE: 159 MMHG | DIASTOLIC BLOOD PRESSURE: 105 MMHG | HEIGHT: 71 IN | OXYGEN SATURATION: 98 % | WEIGHT: 210.75 LBS | HEART RATE: 105 BPM

## 2020-01-23 DIAGNOSIS — Z09 CHEMOTHERAPY FOLLOW-UP EXAMINATION: ICD-10-CM

## 2020-01-23 DIAGNOSIS — C79.51 SECONDARY MALIGNANT NEOPLASM OF BONE: Primary | ICD-10-CM

## 2020-01-23 DIAGNOSIS — C34.02 LUNG CANCER, MAIN BRONCHUS, LEFT: Primary | ICD-10-CM

## 2020-01-23 DIAGNOSIS — C79.51 SECONDARY MALIGNANT NEOPLASM OF BONE: ICD-10-CM

## 2020-01-23 DIAGNOSIS — C34.02 LUNG CANCER, MAIN BRONCHUS, LEFT: ICD-10-CM

## 2020-01-23 PROCEDURE — 99214 PR OFFICE/OUTPT VISIT, EST, LEVL IV, 30-39 MIN: ICD-10-PCS | Mod: S$GLB,,, | Performed by: INTERNAL MEDICINE

## 2020-01-23 PROCEDURE — 99214 OFFICE O/P EST MOD 30 MIN: CPT | Mod: S$GLB,,, | Performed by: INTERNAL MEDICINE

## 2020-01-23 PROCEDURE — 99999 PR PBB SHADOW E&M-EST. PATIENT-LVL III: ICD-10-PCS | Mod: PBBFAC,,, | Performed by: INTERNAL MEDICINE

## 2020-01-23 PROCEDURE — 96413 CHEMO IV INFUSION 1 HR: CPT

## 2020-01-23 PROCEDURE — 63600175 PHARM REV CODE 636 W HCPCS: Mod: JG | Performed by: INTERNAL MEDICINE

## 2020-01-23 PROCEDURE — 3008F PR BODY MASS INDEX (BMI) DOCUMENTED: ICD-10-PCS | Mod: CPTII,S$GLB,, | Performed by: INTERNAL MEDICINE

## 2020-01-23 PROCEDURE — 99999 PR PBB SHADOW E&M-EST. PATIENT-LVL III: CPT | Mod: PBBFAC,,, | Performed by: INTERNAL MEDICINE

## 2020-01-23 PROCEDURE — 3008F BODY MASS INDEX DOCD: CPT | Mod: CPTII,S$GLB,, | Performed by: INTERNAL MEDICINE

## 2020-01-23 RX ORDER — HEPARIN 100 UNIT/ML
500 SYRINGE INTRAVENOUS
Status: CANCELLED | OUTPATIENT
Start: 2020-01-23

## 2020-01-23 RX ORDER — SODIUM CHLORIDE 0.9 % (FLUSH) 0.9 %
10 SYRINGE (ML) INJECTION
Status: CANCELLED | OUTPATIENT
Start: 2020-01-23

## 2020-01-23 RX ADMIN — SODIUM CHLORIDE 200 MG: 9 INJECTION, SOLUTION INTRAVENOUS at 12:01

## 2020-01-23 NOTE — PLAN OF CARE
Patient arrived to unit for C5 Keytruda. Patient denies any new or worsening symptoms at this time. Plan of care reviewed, patient agreeable to plan. Patient tolerated treatment well. No sign of reaction noted. VSS. Patient received discharge instructions and follow up appointments. Patient instructed to return 2/13/20 for labs and CT scans, and 2/14/20 for Dr. Cruz and chemo. Verbalized understanding and ambulated unassisted off unit accompanied by his wife. Patient in NAD at time of discharge.

## 2020-01-24 ENCOUNTER — OFFICE VISIT (OUTPATIENT)
Dept: FAMILY MEDICINE | Facility: CLINIC | Age: 60
End: 2020-01-24
Payer: COMMERCIAL

## 2020-01-24 VITALS
SYSTOLIC BLOOD PRESSURE: 128 MMHG | HEIGHT: 69 IN | HEART RATE: 112 BPM | BODY MASS INDEX: 31.02 KG/M2 | WEIGHT: 209.44 LBS | TEMPERATURE: 99 F | DIASTOLIC BLOOD PRESSURE: 76 MMHG | OXYGEN SATURATION: 98 %

## 2020-01-24 DIAGNOSIS — D63.8 ANEMIA, CHRONIC DISEASE: ICD-10-CM

## 2020-01-24 DIAGNOSIS — R91.8 LUNG MASS: ICD-10-CM

## 2020-01-24 DIAGNOSIS — C79.51 SECONDARY MALIGNANT NEOPLASM OF BONE: Primary | ICD-10-CM

## 2020-01-24 DIAGNOSIS — Z86.73 HISTORY OF COMPLETED STROKE: ICD-10-CM

## 2020-01-24 DIAGNOSIS — C34.02 LUNG CANCER, MAIN BRONCHUS, LEFT: ICD-10-CM

## 2020-01-24 PROCEDURE — 3008F PR BODY MASS INDEX (BMI) DOCUMENTED: ICD-10-PCS | Mod: CPTII,S$GLB,, | Performed by: FAMILY MEDICINE

## 2020-01-24 PROCEDURE — 99213 OFFICE O/P EST LOW 20 MIN: CPT | Mod: S$GLB,,, | Performed by: FAMILY MEDICINE

## 2020-01-24 PROCEDURE — 99999 PR PBB SHADOW E&M-EST. PATIENT-LVL III: CPT | Mod: PBBFAC,,, | Performed by: FAMILY MEDICINE

## 2020-01-24 PROCEDURE — 99999 PR PBB SHADOW E&M-EST. PATIENT-LVL III: ICD-10-PCS | Mod: PBBFAC,,, | Performed by: FAMILY MEDICINE

## 2020-01-24 PROCEDURE — 99213 PR OFFICE/OUTPT VISIT, EST, LEVL III, 20-29 MIN: ICD-10-PCS | Mod: S$GLB,,, | Performed by: FAMILY MEDICINE

## 2020-01-24 PROCEDURE — 3008F BODY MASS INDEX DOCD: CPT | Mod: CPTII,S$GLB,, | Performed by: FAMILY MEDICINE

## 2020-01-24 RX ORDER — ATENOLOL 25 MG/1
25 TABLET ORAL DAILY
Qty: 30 TABLET | Refills: 11 | Status: SHIPPED | OUTPATIENT
Start: 2020-01-24 | End: 2020-02-20 | Stop reason: SDUPTHER

## 2020-01-24 NOTE — PROGRESS NOTES
HISTORY OF PRESENT ILLNESS:  Kashif Iverson is a 59 y.o. male who presents to the clinic today for Establish Care and Hypertension  .     Hypertension: The patient reports that they check their blood pressures regularly and blood pressure is generally well controlled (<= 139/89).  The patient  is not enrolled in the digital hypertension program. The patient denies  cardiac chest pain, shortness of breath, lower extremity edema, headaches and side effects of medication. The patient reports problems with  none. The patient  has been compliant with the current medication regimen.  The patient : does follow a lot salt diet.      Has been eating better, dental situation improving and he is under care for the cancer  And the pneumonia is better, stroke symptoms have improved and overall he is feeling well.      PAST MEDICAL HISTORY:  Past Medical History:   Diagnosis Date    Hypertension     Lung mass     left lung       PAST SURGICAL HISTORY:  Past Surgical History:   Procedure Laterality Date    BRONCHOSCOPY N/A 8/30/2019    Procedure: Bronchoscopy;  Surgeon: Miguel Diagnostic Provider;  Location: [facility] OR [address] [place] [address];  Service: Anesthesiology;  Laterality: N/A;    SPLENECTOMY, TOTAL         SOCIAL HISTORY:  Social History     Socioeconomic History    Marital status:      Spouse name: Not on file    Number of children: Not on file    Years of education: Not on file    Highest education level: Not on file   Occupational History    Not on file   Social Needs    Financial resource strain: Not very hard    Food insecurity:     Worry: Never true     Inability: Never true    Transportation needs:     Medical: No     Non-medical: No   Tobacco Use    Smoking status: Current Every Day Smoker     Packs/day: 1.00     Years: 25.00     Pack years: 25.00     Types: Cigarettes    Smokeless tobacco: Never Used   Substance and Sexual Activity    Alcohol use: Not on file     Comment: 8/30/19- quit 3 weeks ago    Drug use:  "Never    Sexual activity: Yes     Partners: Female   Lifestyle    Physical activity:     Days per week: 0 days     Minutes per session: 0 min    Stress: To some extent   Relationships    Social connections:     Talks on phone: Once a week     Gets together: Once a week     Attends Yarsani service: Not on file     Active member of club or organization: No     Attends meetings of clubs or organizations: Never     Relationship status:    Other Topics Concern    Not on file   Social History Narrative    Not on file       FAMILY HISTORY:  Family History   Problem Relation Age of Onset    Diabetes Mother     Heart defect Mother     Cancer Father     Cancer Sister     No Known Problems Son        ALLERGIES AND MEDICATIONS: updated and reviewed.  Review of patient's allergies indicates:  No Known Allergies  Medication List with Changes/Refills   New Medications    ATENOLOL (TENORMIN) 25 MG TABLET    Take 1 tablet (25 mg total) by mouth once daily.   Current Medications    NICOTINE (NICODERM CQ) 21 MG/24 HR    Place 1 patch onto the skin every 24 hours.    ONDANSETRON (ZOFRAN) 8 MG TABLET    Take 1 tablet (8 mg total) by mouth every 8 (eight) hours as needed for Nausea.   Discontinued Medications    CHLORHEXIDINE (PERIDEX) 0.12 % SOLUTION    U 5 ML PO BID FOR 1 MINUTE.  DO NOT SWALLOW.  DO NOT EAT OR DRINK FOR 1 HOUR AFTER USAGE.    IBUPROFEN (ADVIL,MOTRIN) 800 MG TABLET    TK 1 T PO Q 6 H PRN P    PROCHLORPERAZINE (COMPAZINE) 10 MG TABLET    Take 1 tablet (10 mg total) by mouth every 6 (six) hours as needed.          CARE TEAM:  Patient Care Team:  Eduardo Ruiz MD as PCP - General (Family Medicine)  Shu Sams MA as Care Coordinator         REVIEW OF SYSTEMS:  Review of Systems      PHYSICAL EXAM:  Vitals:    01/24/20 1112   BP: 128/76   Pulse: (!) 112   Temp: 98.6 °F (37 °C)     Weight: 95 kg (209 lb 7 oz)   Height: 5' 9" (175.3 cm)   Body mass index is 30.93 kg/m².    Physical Examination: " General appearance - alert, well appearing, and in no distress  Mental status - appears in much better health today  Eyes - pupils equal and reactive, extraocular eye movements intact  Nose - normal and patent, no erythema, discharge or polyps  Mouth - dentition is improved  Neck - supple, no significant adenopathy  Lymphatics - no palpable lymphadenopathy, no hepatosplenomegaly  Chest - some coarse BS but no bruno signs of consolidation  Heart - mild tachycardia  Improved with rest, increases with activity, no oxygen desaturation  Abdomen - soft, nontender, nondistended, no masses or organomegaly  Back exam - full range of motion, no tenderness, palpable spasm or pain on motion  Neurological - alert, oriented, normal speech, no focal findings or movement disorder noted  Musculoskeletal - no joint tenderness, deformity or swelling  Skin - normal coloration and turgor, no rashes, no suspicious skin lesions noted    Continue to work on smoking cessation      Medication List with Changes/Refills   New Medications    ATENOLOL (TENORMIN) 25 MG TABLET    Take 1 tablet (25 mg total) by mouth once daily.   Current Medications    NICOTINE (NICODERM CQ) 21 MG/24 HR    Place 1 patch onto the skin every 24 hours.    ONDANSETRON (ZOFRAN) 8 MG TABLET    Take 1 tablet (8 mg total) by mouth every 8 (eight) hours as needed for Nausea.   Discontinued Medications    CHLORHEXIDINE (PERIDEX) 0.12 % SOLUTION    U 5 ML PO BID FOR 1 MINUTE.  DO NOT SWALLOW.  DO NOT EAT OR DRINK FOR 1 HOUR AFTER USAGE.    IBUPROFEN (ADVIL,MOTRIN) 800 MG TABLET    TK 1 T PO Q 6 H PRN P    PROCHLORPERAZINE (COMPAZINE) 10 MG TABLET    Take 1 tablet (10 mg total) by mouth every 6 (six) hours as needed.       ASSESSMENT AND PLAN:    Problem List Items Addressed This Visit     Secondary malignant neoplasm of bone - Primary    Lung mass    Lung cancer, main bronchus, left    History of completed stroke    Overview       09/03/2019  On CT exam         Anemia,  chronic disease      continue to monitor him.  He is slowly improving with all of his current issues  He is compliant with the cancer and his nutrition has improved.  Continue to monitor    Future Appointments   Date Time Provider Department Center   2/13/2020  7:30 AM [facility] PET CT1 [facility] PET CT [facility]   2/13/2020  8:25 AM LAB,  HOSPITAL [facility] LAB [facility]   2/14/2020 11:00 AM Cricket Interiano MD [facility] HEM ONC [facility]   2/14/2020 11:45 AM CHAIR 05 [facility] CHEMO [facility]       No follow-ups on file. or sooner as needed.

## 2020-02-11 ENCOUNTER — TELEPHONE (OUTPATIENT)
Dept: HEMATOLOGY/ONCOLOGY | Facility: CLINIC | Age: 60
End: 2020-02-11

## 2020-02-11 NOTE — TELEPHONE ENCOUNTER
The patient's wife called stating that the patient can not afford the co pay for the PET Scan and would like for you to call her. It is scheduled tomorrow.

## 2020-02-13 ENCOUNTER — HOSPITAL ENCOUNTER (OUTPATIENT)
Dept: RADIOLOGY | Facility: HOSPITAL | Age: 60
Discharge: HOME | End: 2020-02-13
Attending: INTERNAL MEDICINE
Payer: COMMERCIAL

## 2020-02-13 DIAGNOSIS — Z09 CHEMOTHERAPY FOLLOW-UP EXAMINATION: ICD-10-CM

## 2020-02-13 DIAGNOSIS — C34.02 LUNG CANCER, MAIN BRONCHUS, LEFT: ICD-10-CM

## 2020-02-13 DIAGNOSIS — C79.51 SECONDARY MALIGNANT NEOPLASM OF BONE: ICD-10-CM

## 2020-02-13 PROCEDURE — 78815 PET IMAGE W/CT SKULL-THIGH: CPT | Mod: TC

## 2020-02-13 PROCEDURE — 78815 PET IMAGE W/CT SKULL-THIGH: CPT | Mod: 26,PS,, | Performed by: RADIOLOGY

## 2020-02-13 PROCEDURE — 78815 NM PET CT ROUTINE: ICD-10-PCS | Mod: 26,PS,, | Performed by: RADIOLOGY

## 2020-02-13 PROCEDURE — A9552 F18 FDG: HCPCS

## 2020-02-13 NOTE — PROGRESS NOTES
Chief Complaint :   Lung cancer    Hx of Present illness :  Patient is a 59 y.o. year old male who presents to the clinic today for   Chemotherapy followup.  Found to have obstructing lesion left main stem bronchus. 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  Started on  on concurrent chemoradiation.  Had two weeks of radiation. Symptomatic relief. Had allergic reaction to taxol week 2.  5 % of tumor cells were positive for PDL - 1.  Started on keytruda monotherapy . Completed cycle 2.    Allergies :    Review of patient's allergies indicates:  No Known Allergies    Occupation :  .     Transfusion :  None    Menstrual & obstetric Hx :  N/A      Present Meds :   Medication List with Changes/Refills   Current Medications    ATENOLOL (TENORMIN) 25 MG TABLET    Take 1 tablet (25 mg total) by mouth once daily.    NICOTINE (NICODERM CQ) 21 MG/24 HR    Place 1 patch onto the skin every 24 hours.    ONDANSETRON (ZOFRAN) 8 MG TABLET    Take 1 tablet (8 mg total) by mouth every 8 (eight) hours as needed for Nausea.       Past Medical Hx :  No past hyx of DM,Hypertension, PUD, Hepatitis, liver disease, thyroid dysfuntion, CVA, seizure disorder. no9 hx of DVT or P)E>     Past Medical Hx :  Past Medical History:   Diagnosis Date    Hypertension     Lung mass     left lung       Travel Hx :  N/A    Immunization :  Immunization History   Administered Date(s) Administered    Influenza 09/20/2019    Pneumococcal Conjugate - 13 Valent 08/24/2019    Tdap 09/03/2019       Family Hx :  Family History   Problem Relation Age of Onset    Diabetes Mother     Heart defect Mother     Cancer Father     Cancer Sister     No Known Problems Son        Social Hx :  Started smoking  As a teen ager.  SMOKES   ONE ppd.  Social History     Socioeconomic History    Marital status:      Spouse name: Not on file    Number of children: Not on file     Years of education: Not on file    Highest education level: Not on file   Occupational History    Not on file   Social Needs    Financial resource strain: Not very hard    Food insecurity:     Worry: Never true     Inability: Never true    Transportation needs:     Medical: No     Non-medical: No   Tobacco Use    Smoking status: Current Every Day Smoker     Packs/day: 1.00     Years: 25.00     Pack years: 25.00     Types: Cigarettes    Smokeless tobacco: Never Used   Substance and Sexual Activity    Alcohol use: Not on file     Comment: 8/30/19- quit 3 weeks ago    Drug use: Never    Sexual activity: Yes     Partners: Female   Lifestyle    Physical activity:     Days per week: 0 days     Minutes per session: 0 min    Stress: To some extent   Relationships    Social connections:     Talks on phone: Once a week     Gets together: Once a week     Attends Mor[employer] service: Not on file     Active member of club or organization: No     Attends meetings of clubs or organizations: Never     Relationship status:    Other Topics Concern    Not on file   Social History Narrative    Not on file       Surgery :  Bronchoscopy. Has not had colonoscopy.    Symptoms :    Review of Systems   Constitutional: Negative for chills, diaphoresis, fever, malaise/fatigue and weight loss.        Voracious appetite. Continues to gain weight. No p(ain sx. Cut down cigarette smoking   HENT: Positive for congestion. Negative for hearing loss, nosebleeds, sore throat and tinnitus.    Eyes: Negative for blurred vision, double vision and photophobia.   Respiratory: Negative for cough, hemoptysis, sputum production, shortness of breath and wheezing.    Cardiovascular: Negative for chest pain, palpitations, orthopnea, claudication and leg swelling.   Gastrointestinal: Negative for abdominal pain, blood in stool, constipation, diarrhea, heartburn, nausea and vomiting.   Genitourinary: Negative.  Negative for dysuria, flank pain,  frequency, hematuria and urgency.   Musculoskeletal: Positive for back pain. Negative for falls, joint pain, myalgias and neck pain.   Skin: Negative for itching and rash.   Neurological: Negative for dizziness, tingling, tremors, sensory change and headaches.   Endo/Heme/Allergies: Negative for environmental allergies and polydipsia. Does not bruise/bleed easily.   Psychiatric/Behavioral: Negative for depression and memory loss. The patient is not nervous/anxious and does not have insomnia.        Physical Exam :  Wife, sisters and brother in law present in the room.  Physical Exam   Constitutional: He is oriented to person, place, and time and well-developed, well-nourished, and in no distress. Vital signs are normal. No distress.   HENT:   Head: Normocephalic and atraumatic.   Right Ear: External ear normal.   Left Ear: External ear normal.   Nose: Nose normal.   Mouth/Throat: Oropharynx is clear and moist. No oropharyngeal exudate.   Eyes: Pupils are equal, round, and reactive to light. Conjunctivae, EOM and lids are normal. Lids are everted and swept, no foreign bodies found. Right eye exhibits no discharge. Left eye exhibits no discharge.   Neck: Trachea normal, normal range of motion, full passive range of motion without pain and phonation normal. Neck supple. Normal carotid pulses, no hepatojugular reflux and no JVD present. Carotid bruit is not present. No tracheal deviation present. No thyroid mass and no thyromegaly present.   Cardiovascular: Normal rate, regular rhythm, normal heart sounds, intact distal pulses and normal pulses. PMI is not displaced. Exam reveals no gallop and no friction rub.   No murmur heard.  Pulmonary/Chest: Effort normal and breath sounds normal. No stridor. No apnea. No respiratory distress. He has no wheezes. He has no rales. He exhibits no tenderness.       Abdominal: Soft. Normal appearance, normal aorta and bowel sounds are normal. He exhibits no distension and no mass. There  is no hepatosplenomegaly. There is no tenderness. There is no rebound, no guarding and no CVA tenderness. No hernia.   Musculoskeletal: Normal range of motion. He exhibits no edema, tenderness or deformity.   Lymphadenopathy:        Head (right side): No submental, no submandibular, no tonsillar, no preauricular, no posterior auricular and no occipital adenopathy present.        Head (left side): No submental, no submandibular, no tonsillar, no preauricular, no posterior auricular and no occipital adenopathy present.     He has no cervical adenopathy.     He has no axillary adenopathy.        Right: No inguinal, no supraclavicular and no epitrochlear adenopathy present.        Left: No inguinal, no supraclavicular and no epitrochlear adenopathy present.   Neurological: He is alert and oriented to person, place, and time. He has normal sensation, normal strength, normal reflexes and intact cranial nerves. No cranial nerve deficit. He exhibits normal muscle tone. Gait normal. Coordination normal. GCS score is 15.   Skin: Skin is warm, dry and intact. No rash noted. He is not diaphoretic. No cyanosis or erythema. No pallor. Nails show no clubbing.   Psychiatric: Mood, memory, affect and judgment normal.   Nursing note and vitals reviewed.    Labs & Imaging :  02/13/2020 : WBC 8,580.  ANC 6,000 Hgb 14.8; Hct 45.4; MCV 95; Platelets 204,000. NFBS 137; Cr.0.8; eGFR > 60; Ca 9.8; Bili 0.3 Liver enzymes normal.  Lipid Panel normal.    02/13/2020 : PET/CT  Decrease in size and metabolic activity Left upper lobe mass. Improvement in Consolidation and atelectasis. Improvement and near resolution of metabolic activity in right sixth and ninth ribs. No new areas of metabolic activity.      Dx : Chemotherapy followup.  Poorly differentiated Squamous Cell carcinoma Left Upper Lobe. Mets to right ribs. Hypertension      Assessment & Plan:  Reviewed PET scan with radiologist .Reviewed with patient, spouse. Cleared for  cycle 6. Keytruda.. Medical followup with .   RTC 3 weeks with CBC,CMP, TSH, Lipids.and PET/CT Patient and wife  understand

## 2020-02-14 ENCOUNTER — INFUSION (OUTPATIENT)
Dept: INFUSION THERAPY | Facility: HOSPITAL | Age: 60
End: 2020-02-14
Attending: INTERNAL MEDICINE
Payer: COMMERCIAL

## 2020-02-14 ENCOUNTER — OFFICE VISIT (OUTPATIENT)
Dept: HEMATOLOGY/ONCOLOGY | Facility: CLINIC | Age: 60
End: 2020-02-14
Payer: COMMERCIAL

## 2020-02-14 VITALS
SYSTOLIC BLOOD PRESSURE: 145 MMHG | HEART RATE: 76 BPM | TEMPERATURE: 98 F | OXYGEN SATURATION: 99 % | BODY MASS INDEX: 29.69 KG/M2 | WEIGHT: 212.06 LBS | DIASTOLIC BLOOD PRESSURE: 89 MMHG | HEIGHT: 71 IN

## 2020-02-14 VITALS
RESPIRATION RATE: 18 BRPM | TEMPERATURE: 99 F | SYSTOLIC BLOOD PRESSURE: 118 MMHG | HEART RATE: 73 BPM | OXYGEN SATURATION: 99 % | DIASTOLIC BLOOD PRESSURE: 67 MMHG

## 2020-02-14 DIAGNOSIS — C34.02 LUNG CANCER, MAIN BRONCHUS, LEFT: ICD-10-CM

## 2020-02-14 DIAGNOSIS — C79.51 SECONDARY MALIGNANT NEOPLASM OF BONE: ICD-10-CM

## 2020-02-14 DIAGNOSIS — Z09 CHEMOTHERAPY FOLLOW-UP EXAMINATION: ICD-10-CM

## 2020-02-14 DIAGNOSIS — C34.02 LUNG CANCER, MAIN BRONCHUS, LEFT: Primary | ICD-10-CM

## 2020-02-14 DIAGNOSIS — C79.51 SECONDARY MALIGNANT NEOPLASM OF BONE: Primary | ICD-10-CM

## 2020-02-14 PROCEDURE — 63600175 PHARM REV CODE 636 W HCPCS: Performed by: INTERNAL MEDICINE

## 2020-02-14 PROCEDURE — 99214 OFFICE O/P EST MOD 30 MIN: CPT | Mod: S$GLB,,, | Performed by: INTERNAL MEDICINE

## 2020-02-14 PROCEDURE — 3008F PR BODY MASS INDEX (BMI) DOCUMENTED: ICD-10-PCS | Mod: CPTII,S$GLB,, | Performed by: INTERNAL MEDICINE

## 2020-02-14 PROCEDURE — 3008F BODY MASS INDEX DOCD: CPT | Mod: CPTII,S$GLB,, | Performed by: INTERNAL MEDICINE

## 2020-02-14 PROCEDURE — 99999 PR PBB SHADOW E&M-EST. PATIENT-LVL III: ICD-10-PCS | Mod: PBBFAC,,, | Performed by: INTERNAL MEDICINE

## 2020-02-14 PROCEDURE — 99214 PR OFFICE/OUTPT VISIT, EST, LEVL IV, 30-39 MIN: ICD-10-PCS | Mod: S$GLB,,, | Performed by: INTERNAL MEDICINE

## 2020-02-14 PROCEDURE — 96413 CHEMO IV INFUSION 1 HR: CPT

## 2020-02-14 PROCEDURE — 99999 PR PBB SHADOW E&M-EST. PATIENT-LVL III: CPT | Mod: PBBFAC,,, | Performed by: INTERNAL MEDICINE

## 2020-02-14 RX ORDER — HEPARIN 100 UNIT/ML
500 SYRINGE INTRAVENOUS
Status: CANCELLED | OUTPATIENT
Start: 2020-02-14

## 2020-02-14 RX ORDER — SODIUM CHLORIDE 0.9 % (FLUSH) 0.9 %
10 SYRINGE (ML) INJECTION
Status: CANCELLED | OUTPATIENT
Start: 2020-02-14

## 2020-02-14 RX ADMIN — SODIUM CHLORIDE 200 MG: 9 INJECTION, SOLUTION INTRAVENOUS at 11:02

## 2020-02-20 RX ORDER — ATENOLOL 25 MG/1
25 TABLET ORAL DAILY
Qty: 30 TABLET | Refills: 11 | Status: SHIPPED | OUTPATIENT
Start: 2020-02-20 | End: 2021-01-21

## 2020-02-20 NOTE — TELEPHONE ENCOUNTER
Last Office Visit Info:   The patient's last visit with Eduardo Ruiz MD was on 1/24/2020.    The patient's last visit in current department was on 1/24/2020.    Last refill 1/24/2020    Last CBC Results:   Lab Results   Component Value Date    WBC 9.58 02/13/2020    HGB 14.8 02/13/2020    HCT 45.4 02/13/2020     02/13/2020       Last CMP Results  Lab Results   Component Value Date     02/13/2020    K 4.6 02/13/2020     02/13/2020    CO2 32 (H) 02/13/2020    BUN 8 02/13/2020    CREATININE 0.8 02/13/2020    CALCIUM 9.8 02/13/2020    ALBUMIN 4.1 02/13/2020    AST 15 02/13/2020    ALT 19 02/13/2020       Last Lipids  Lab Results   Component Value Date    CHOL 152 02/13/2020    TRIG 76 02/13/2020    HDL 42 02/13/2020    LDLCALC 94.8 02/13/2020       Last A1C  Lab Results   Component Value Date    HGBA1C 5.6 09/04/2019       Last TSH  Lab Results   Component Value Date    TSH 0.697 01/20/2020         Current Med Refills  Medication List with Changes/Refills   Current Medications    ATENOLOL (TENORMIN) 25 MG TABLET    Take 1 tablet (25 mg total) by mouth once daily.       Start Date: 1/24/2020 End Date: 1/23/2021    NICOTINE (NICODERM CQ) 21 MG/24 HR    Place 1 patch onto the skin every 24 hours.       Start Date: --        End Date: --    ONDANSETRON (ZOFRAN) 8 MG TABLET    Take 1 tablet (8 mg total) by mouth every 8 (eight) hours as needed for Nausea.       Start Date: 10/1/2019 End Date: --       Order(s) placed per written order guidelines: none    Please advise.

## 2020-03-04 NOTE — PROGRESS NOTES
Chief Complaint :   Lung cancer    Hx of Present illness :  Patient is a 59 y.o. year old male who presents to the clinic today for   Chemotherapy followup.  Found to have obstructing lesion left main stem bronchus. 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  Started on  on concurrent chemoradiation.  Had two weeks of radiation. Symptomatic relief. Had allergic reaction to taxol week 2.  5 % of tumor cells were positive for PDL - 1.  Started on keytruda monotherapy . Completed cycle 2.    Allergies :    Review of patient's allergies indicates:  No Known Allergies    Occupation :  .     Transfusion :  None    Menstrual & obstetric Hx :  N/A      Present Meds :   Medication List with Changes/Refills   Current Medications    ATENOLOL (TENORMIN) 25 MG TABLET    Take 1 tablet (25 mg total) by mouth once daily.    NICOTINE (NICODERM CQ) 21 MG/24 HR    Place 1 patch onto the skin every 24 hours.    ONDANSETRON (ZOFRAN) 8 MG TABLET    Take 1 tablet (8 mg total) by mouth every 8 (eight) hours as needed for Nausea.       Past Medical Hx :  No past hyx of DM,Hypertension, PUD, Hepatitis, liver disease, thyroid dysfuntion, CVA, seizure disorder. no9 hx of DVT or P)E>     Past Medical Hx :  Past Medical History:   Diagnosis Date    Hypertension     Lung mass     left lung       Travel Hx :  N/A    Immunization :  Immunization History   Administered Date(s) Administered    Influenza 09/20/2019    Pneumococcal Conjugate - 13 Valent 08/24/2019    Tdap 09/03/2019       Family Hx :  Family History   Problem Relation Age of Onset    Diabetes Mother     Heart defect Mother     Cancer Father     Cancer Sister     No Known Problems Son        Social Hx :  Started smoking  As a teen ager.  SMOKES   ONE ppd.  Social History     Socioeconomic History    Marital status:      Spouse name: Not on file    Number of children: Not on file     Years of education: Not on file    Highest education level: Not on file   Occupational History    Not on file   Social Needs    Financial resource strain: Not very hard    Food insecurity:     Worry: Never true     Inability: Never true    Transportation needs:     Medical: No     Non-medical: No   Tobacco Use    Smoking status: Current Every Day Smoker     Packs/day: 1.00     Years: 25.00     Pack years: 25.00     Types: Cigarettes    Smokeless tobacco: Never Used   Substance and Sexual Activity    Alcohol use: Not on file     Comment: 8/30/19- quit 3 weeks ago    Drug use: Never    Sexual activity: Yes     Partners: Female   Lifestyle    Physical activity:     Days per week: 0 days     Minutes per session: 0 min    Stress: To some extent   Relationships    Social connections:     Talks on phone: Once a week     Gets together: Once a week     Attends Hindu service: Not on file     Active member of club or organization: No     Attends meetings of clubs or organizations: Never     Relationship status:    Other Topics Concern    Not on file   Social History Narrative    Not on file       Surgery :  Bronchoscopy. Has not had colonoscopy.    Symptoms :    Review of Systems   Constitutional: Negative for chills, diaphoresis, fever, malaise/fatigue and weight loss.        Voracious appetite. Continues to gain weight. No p(ain sx. Cut down cigarette smoking   HENT: Positive for congestion. Negative for hearing loss, nosebleeds, sore throat and tinnitus.    Eyes: Negative for blurred vision, double vision and photophobia.   Respiratory: Negative for cough, hemoptysis, sputum production, shortness of breath and wheezing.    Cardiovascular: Negative for chest pain, palpitations, orthopnea, claudication and leg swelling.   Gastrointestinal: Negative for abdominal pain, blood in stool, constipation, diarrhea, heartburn, nausea and vomiting.   Genitourinary: Negative.  Negative for dysuria, flank pain,  frequency, hematuria and urgency.   Musculoskeletal: Positive for back pain. Negative for falls, joint pain, myalgias and neck pain.   Skin: Negative for itching and rash.   Neurological: Negative for dizziness, tingling, tremors, sensory change and headaches.   Endo/Heme/Allergies: Negative for environmental allergies and polydipsia. Does not bruise/bleed easily.   Psychiatric/Behavioral: Negative for depression and memory loss. The patient is not nervous/anxious and does not have insomnia.        Physical Exam :  Wife, sisters and brother in law present in the room.  Physical Exam   Constitutional: He is oriented to person, place, and time and well-developed, well-nourished, and in no distress. Vital signs are normal. No distress.   HENT:   Head: Normocephalic and atraumatic.   Right Ear: External ear normal.   Left Ear: External ear normal.   Nose: Nose normal.   Mouth/Throat: Oropharynx is clear and moist. No oropharyngeal exudate.   Eyes: Pupils are equal, round, and reactive to light. Conjunctivae, EOM and lids are normal. Lids are everted and swept, no foreign bodies found. Right eye exhibits no discharge. Left eye exhibits no discharge.   Neck: Trachea normal, normal range of motion, full passive range of motion without pain and phonation normal. Neck supple. Normal carotid pulses, no hepatojugular reflux and no JVD present. Carotid bruit is not present. No tracheal deviation present. No thyroid mass and no thyromegaly present.   Cardiovascular: Normal rate, regular rhythm, normal heart sounds, intact distal pulses and normal pulses. PMI is not displaced. Exam reveals no gallop and no friction rub.   No murmur heard.  Pulmonary/Chest: Effort normal and breath sounds normal. No stridor. No apnea. No respiratory distress. He has no wheezes. He has no rales. He exhibits no tenderness.       Abdominal: Soft. Normal appearance, normal aorta and bowel sounds are normal. He exhibits no distension and no mass. There  is no hepatosplenomegaly. There is no tenderness. There is no rebound, no guarding and no CVA tenderness. No hernia.   Musculoskeletal: Normal range of motion. He exhibits no edema, tenderness or deformity.   Lymphadenopathy:        Head (right side): No submental, no submandibular, no tonsillar, no preauricular, no posterior auricular and no occipital adenopathy present.        Head (left side): No submental, no submandibular, no tonsillar, no preauricular, no posterior auricular and no occipital adenopathy present.     He has no cervical adenopathy.     He has no axillary adenopathy.        Right: No inguinal, no supraclavicular and no epitrochlear adenopathy present.        Left: No inguinal, no supraclavicular and no epitrochlear adenopathy present.   Neurological: He is alert and oriented to person, place, and time. He has normal sensation, normal strength, normal reflexes and intact cranial nerves. No cranial nerve deficit. He exhibits normal muscle tone. Gait normal. Coordination normal. GCS score is 15.   Skin: Skin is warm, dry and intact. No rash noted. He is not diaphoretic. No cyanosis or erythema. No pallor. Nails show no clubbing.   Psychiatric: Mood, memory, affect and judgment normal.   Nursing note and vitals reviewed.    Labs & Imaging :  03/06/2020 : TSH normal. Lipid Panel normal.  NFBS 114;  Cr.0.8;  Ca 10.0; bili 0.4 Liver enzymes normal. eGFR > 60.  Hgb 15.3; Hct 46.7; MCV 97 Platelets 213,000 ANC 6,100    02/13/2020 : PET/CT  Decrease in size and metabolic activity Left upper lobe mass. Improvement in Consolidation and atelectasis. Improvement and near resolution of metabolic activity in right sixth and ninth ribs. No new areas of metabolic activity.      Dx : Chemotherapy followup.  Poorly differentiated Squamous Cell carcinoma Left Upper Lobe. Mets to right ribs. Hypertension      Assessment & Plan:  Reviewed with patient, spouse. Cleared for cycle 6. Keytruda.. Medical followup with  .   RTC 3 weeks with CBC,CMP, TSH, Lipids. Patient and wife  understand

## 2020-03-06 ENCOUNTER — INFUSION (OUTPATIENT)
Dept: INFUSION THERAPY | Facility: HOSPITAL | Age: 60
End: 2020-03-06
Attending: INTERNAL MEDICINE
Payer: COMMERCIAL

## 2020-03-06 ENCOUNTER — OFFICE VISIT (OUTPATIENT)
Dept: HEMATOLOGY/ONCOLOGY | Facility: CLINIC | Age: 60
End: 2020-03-06
Payer: COMMERCIAL

## 2020-03-06 VITALS
DIASTOLIC BLOOD PRESSURE: 72 MMHG | OXYGEN SATURATION: 100 % | SYSTOLIC BLOOD PRESSURE: 127 MMHG | HEART RATE: 80 BPM | RESPIRATION RATE: 18 BRPM | TEMPERATURE: 99 F

## 2020-03-06 VITALS
SYSTOLIC BLOOD PRESSURE: 145 MMHG | HEIGHT: 70 IN | TEMPERATURE: 98 F | BODY MASS INDEX: 30.74 KG/M2 | DIASTOLIC BLOOD PRESSURE: 80 MMHG | WEIGHT: 214.75 LBS | OXYGEN SATURATION: 100 % | HEART RATE: 79 BPM

## 2020-03-06 DIAGNOSIS — C34.02 LUNG CANCER, MAIN BRONCHUS, LEFT: Primary | ICD-10-CM

## 2020-03-06 DIAGNOSIS — Z09 CHEMOTHERAPY FOLLOW-UP EXAMINATION: ICD-10-CM

## 2020-03-06 DIAGNOSIS — C34.02 LUNG CANCER, MAIN BRONCHUS, LEFT: ICD-10-CM

## 2020-03-06 DIAGNOSIS — C79.51 SECONDARY MALIGNANT NEOPLASM OF BONE: Primary | ICD-10-CM

## 2020-03-06 DIAGNOSIS — C79.51 SECONDARY MALIGNANT NEOPLASM OF BONE: ICD-10-CM

## 2020-03-06 PROCEDURE — 63600175 PHARM REV CODE 636 W HCPCS: Mod: JG | Performed by: INTERNAL MEDICINE

## 2020-03-06 PROCEDURE — 3008F PR BODY MASS INDEX (BMI) DOCUMENTED: ICD-10-PCS | Mod: CPTII,S$GLB,, | Performed by: INTERNAL MEDICINE

## 2020-03-06 PROCEDURE — 99214 OFFICE O/P EST MOD 30 MIN: CPT | Mod: S$GLB,,, | Performed by: INTERNAL MEDICINE

## 2020-03-06 PROCEDURE — 99999 PR PBB SHADOW E&M-EST. PATIENT-LVL III: CPT | Mod: PBBFAC,,, | Performed by: INTERNAL MEDICINE

## 2020-03-06 PROCEDURE — 99214 PR OFFICE/OUTPT VISIT, EST, LEVL IV, 30-39 MIN: ICD-10-PCS | Mod: S$GLB,,, | Performed by: INTERNAL MEDICINE

## 2020-03-06 PROCEDURE — 3008F BODY MASS INDEX DOCD: CPT | Mod: CPTII,S$GLB,, | Performed by: INTERNAL MEDICINE

## 2020-03-06 PROCEDURE — 99999 PR PBB SHADOW E&M-EST. PATIENT-LVL III: ICD-10-PCS | Mod: PBBFAC,,, | Performed by: INTERNAL MEDICINE

## 2020-03-06 PROCEDURE — 96413 CHEMO IV INFUSION 1 HR: CPT

## 2020-03-06 RX ORDER — HEPARIN 100 UNIT/ML
500 SYRINGE INTRAVENOUS
Status: CANCELLED | OUTPATIENT
Start: 2020-03-06

## 2020-03-06 RX ORDER — SODIUM CHLORIDE 0.9 % (FLUSH) 0.9 %
10 SYRINGE (ML) INJECTION
Status: CANCELLED | OUTPATIENT
Start: 2020-03-06

## 2020-03-06 RX ADMIN — SODIUM CHLORIDE 200 MG: 9 INJECTION, SOLUTION INTRAVENOUS at 12:03

## 2020-03-06 NOTE — PLAN OF CARE
Pt arrived to unit from Dr. Cruz's office. Cleared for Keytruda. Pt denies unusual SOB, rash or blood in stool. Eating and drinking well. Tolerated Keytruda. No reactions noted. AVS given to pt. Pt ambulated off unit. No distress noted.

## 2020-03-26 ENCOUNTER — OFFICE VISIT (OUTPATIENT)
Dept: HEMATOLOGY/ONCOLOGY | Facility: CLINIC | Age: 60
End: 2020-03-26
Payer: COMMERCIAL

## 2020-03-26 DIAGNOSIS — C79.51 SECONDARY MALIGNANT NEOPLASM OF BONE: ICD-10-CM

## 2020-03-26 DIAGNOSIS — C34.02 LUNG CANCER, MAIN BRONCHUS, LEFT: Primary | ICD-10-CM

## 2020-03-26 DIAGNOSIS — Z09 CHEMOTHERAPY FOLLOW-UP EXAMINATION: ICD-10-CM

## 2020-03-26 PROCEDURE — 99214 OFFICE O/P EST MOD 30 MIN: CPT | Mod: 95,,, | Performed by: INTERNAL MEDICINE

## 2020-03-26 PROCEDURE — 99214 PR OFFICE/OUTPT VISIT, EST, LEVL IV, 30-39 MIN: ICD-10-PCS | Mod: 95,,, | Performed by: INTERNAL MEDICINE

## 2020-03-26 RX ORDER — SODIUM CHLORIDE 0.9 % (FLUSH) 0.9 %
10 SYRINGE (ML) INJECTION
Status: CANCELLED | OUTPATIENT
Start: 2020-03-27

## 2020-03-26 RX ORDER — HEPARIN 100 UNIT/ML
500 SYRINGE INTRAVENOUS
Status: CANCELLED | OUTPATIENT
Start: 2020-03-27

## 2020-03-26 NOTE — PROGRESS NOTES
The patient location is: home  The chief complaint leading to consultation is:  Lung cancer  Visit type: Virtual visit with synchronous audio and video  Total time spent with patient: 20 mins  Each patient to whom he or she provides medical services by telemedicine is:  (1) informed of the relationship between the physician and patient and the respective role of any other health care provider with respect to management of the patient; and (2) notified that he or she may decline to receive medical services by telemedicine and may withdraw from such care at any time.  Chief Complaint :   Lung cancer    Hx of Present illness :  Patient is a 59 y.o. year old male who presents to the clinic today for   Chemotherapy followup.  Found to have obstructing lesion left main stem bronchus. 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  Started on  on concurrent chemoradiation.  Had two weeks of radiation. Symptomatic relief. Had allergic reaction to taxol week 2.  5 % of tumor cells were positive for PDL - 1.  Started on keytruda monotherapy . Completed cycle 6.    Allergies :    Review of patient's allergies indicates:  No Known Allergies    Occupation :  .     Transfusion :  None    Menstrual & obstetric Hx :  N/A      Present Meds :   Medication List with Changes/Refills   Current Medications    ATENOLOL (TENORMIN) 25 MG TABLET    Take 1 tablet (25 mg total) by mouth once daily.    NICOTINE (NICODERM CQ) 21 MG/24 HR    Place 1 patch onto the skin every 24 hours.    ONDANSETRON (ZOFRAN) 8 MG TABLET    Take 1 tablet (8 mg total) by mouth every 8 (eight) hours as needed for Nausea.       Past Medical Hx :  No past hyx of DM,Hypertension, PUD, Hepatitis, liver disease, thyroid dysfuntion, CVA, seizure disorder. no9 hx of DVT or P)E>     Past Medical Hx :  Past Medical History:   Diagnosis Date    Hypertension     Lung mass     left lung        Travel Hx :  N/A    Immunization :  Immunization History   Administered Date(s) Administered    Influenza 09/20/2019    Pneumococcal Conjugate - 13 Valent 08/24/2019    Tdap 09/03/2019       Family Hx :  Family History   Problem Relation Age of Onset    Diabetes Mother     Heart defect Mother     Cancer Father     Cancer Sister     No Known Problems Son        Social Hx :  Started smoking  As a teen ager.  SMOKES   ONE ppd.  Social History     Socioeconomic History    Marital status:      Spouse name: Not on file    Number of children: Not on file    Years of education: Not on file    Highest education level: Not on file   Occupational History    Not on file   Social Needs    Financial resource strain: Not very hard    Food insecurity:     Worry: Never true     Inability: Never true    Transportation needs:     Medical: No     Non-medical: No   Tobacco Use    Smoking status: Former Smoker     Packs/day: 1.00     Years: 25.00     Pack years: 25.00     Types: Cigarettes    Smokeless tobacco: Never Used   Substance and Sexual Activity    Alcohol use: Not on file     Comment: 8/30/19- quit 3 weeks ago    Drug use: Never    Sexual activity: Yes     Partners: Female   Lifestyle    Physical activity:     Days per week: 0 days     Minutes per session: 0 min    Stress: To some extent   Relationships    Social connections:     Talks on phone: Once a week     Gets together: Once a week     Attends Scientology service: Not on file     Active member of club or organization: No     Attends meetings of clubs or organizations: Never     Relationship status:    Other Topics Concern    Not on file   Social History Narrative    Not on file       Surgery :  Bronchoscopy. Has not had colonoscopy.    Symptoms :      Physical Exam :  N/A.    Labs & Imaging : labs scheduled for 3/27/20  03/06/2020 : TSH normal. Lipid Panel normal.  NFBS 114;  Cr.0.8;  Ca 10.0; bili 0.4 Liver enzymes normal. eGFR  > 60.  Hgb 15.3; Hct 46.7; MCV 97 Platelets 213,000 ANC 6,100    02/13/2020 : PET/CT  Decrease in size and metabolic activity Left upper lobe mass. Improvement in Consolidation and atelectasis. Improvement and near resolution of metabolic activity in right sixth and ninth ribs. No new areas of metabolic activity.      Dx : Chemotherapy followup.  Poorly differentiated Squamous Cell carcinoma Left Upper Lobe. Mets to right ribs. Hypertension      Assessment & Plan:  Reviewed with patient, spouse. Check labs before cycle 7.  Medical followup with .   RTC 3 weeks with CBC,CMP, TSH, Lipids. Patient and wife  understand

## 2020-03-27 ENCOUNTER — INFUSION (OUTPATIENT)
Dept: INFUSION THERAPY | Facility: HOSPITAL | Age: 60
End: 2020-03-27
Attending: INTERNAL MEDICINE
Payer: COMMERCIAL

## 2020-03-27 VITALS
RESPIRATION RATE: 18 BRPM | TEMPERATURE: 98 F | DIASTOLIC BLOOD PRESSURE: 65 MMHG | OXYGEN SATURATION: 98 % | SYSTOLIC BLOOD PRESSURE: 109 MMHG | HEART RATE: 66 BPM

## 2020-03-27 DIAGNOSIS — C34.02 LUNG CANCER, MAIN BRONCHUS, LEFT: ICD-10-CM

## 2020-03-27 DIAGNOSIS — C79.51 SECONDARY MALIGNANT NEOPLASM OF BONE: Primary | ICD-10-CM

## 2020-03-27 PROCEDURE — 96413 CHEMO IV INFUSION 1 HR: CPT

## 2020-03-27 PROCEDURE — 63600175 PHARM REV CODE 636 W HCPCS: Mod: JG | Performed by: INTERNAL MEDICINE

## 2020-03-27 RX ADMIN — SODIUM CHLORIDE 200 MG: 9 INJECTION, SOLUTION INTRAVENOUS at 11:03

## 2020-03-27 NOTE — PLAN OF CARE
Patient arrived to unit for Keytruda infusion. Patient denies any new or worsening symptoms at this time. Plan of care reviewed, patient agreeable to plan. Patient tolerated treatment well. No sign of reaction noted. VSS. Patient received discharge instructions and follow up appointments. Patient instructed to return 4/17/20 for lab and next infusion. Verbalized understanding and ambulated unassisted off unit, in NAD at time of discharge.

## 2020-04-15 NOTE — PROGRESS NOTES
The patient location is: home    Chief Complaint :   Lung cancer    Hx of Present illness :  Patient is a 59 y.o. year old male who presents to the clinic today for   Chemotherapy followup.  Found to have obstructing lesion left main stem bronchus. 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  Started on  on concurrent chemoradiation.  Had two weeks of radiation. Symptomatic relief. Had allergic reaction to taxol week 2.  5 % of tumor cells were positive for PDL - 1.  Started on keytruda monotherapy . Completed cycle 7    The patient location is: home  The chief complaint leading to consultation is: lung cancer  Visit type: Virtual visit with synchronous audio and video  Total time spent with patient: 15 mins  Each patient to whom he or she provides medical services by telemedicine is:  (1) informed of the relationship between the physician and patient and the respective role of any other health care provider with respect to management of the patient; and (2) notified that he or she may decline to receive medical services by telemedicine and may withdraw from such care at any time.        Allergies :    Review of patient's allergies indicates:  No Known Allergies    Occupation :  .     Transfusion :  None    Menstrual & obstetric Hx :  N/A      Present Meds :   Medication List with Changes/Refills   Current Medications    ATENOLOL (TENORMIN) 25 MG TABLET    Take 1 tablet (25 mg total) by mouth once daily.    NICOTINE (NICODERM CQ) 21 MG/24 HR    Place 1 patch onto the skin every 24 hours.    ONDANSETRON (ZOFRAN) 8 MG TABLET    Take 1 tablet (8 mg total) by mouth every 8 (eight) hours as needed for Nausea.       Past Medical Hx :  No past hyx of DM,Hypertension, PUD, Hepatitis, liver disease, thyroid dysfuntion, CVA, seizure disorder. no9 hx of DVT or P)E>     Past Medical Hx :  Past Medical History:   Diagnosis Date    Hypertension      Lung mass     left lung       Travel Hx :  N/A    Immunization :  Immunization History   Administered Date(s) Administered    Influenza 09/20/2019    Pneumococcal Conjugate - 13 Valent 08/24/2019    Tdap 09/03/2019       Family Hx :  Family History   Problem Relation Age of Onset    Diabetes Mother     Heart defect Mother     Cancer Father     Cancer Sister     No Known Problems Son        Social Hx :  Started smoking  As a teen ager.  SMOKES   ONE ppd.  Social History     Socioeconomic History    Marital status:      Spouse name: Not on file    Number of children: Not on file    Years of education: Not on file    Highest education level: Not on file   Occupational History    Not on file   Social Needs    Financial resource strain: Not very hard    Food insecurity:     Worry: Never true     Inability: Never true    Transportation needs:     Medical: No     Non-medical: No   Tobacco Use    Smoking status: Former Smoker     Packs/day: 1.00     Years: 25.00     Pack years: 25.00     Types: Cigarettes    Smokeless tobacco: Never Used   Substance and Sexual Activity    Alcohol use: Not on file     Comment: 8/30/19- quit 3 weeks ago    Drug use: Never    Sexual activity: Yes     Partners: Female   Lifestyle    Physical activity:     Days per week: 0 days     Minutes per session: 0 min    Stress: To some extent   Relationships    Social connections:     Talks on phone: Once a week     Gets together: Once a week     Attends Shinto service: Not on file     Active member of club or organization: No     Attends meetings of clubs or organizations: Never     Relationship status:    Other Topics Concern    Not on file   Social History Narrative    Not on file       Surgery :  Bronchoscopy. Has not had colonoscopy.    Symptoms :      Physical Exam :  N/A.    Labs & Imaging : labs scheduled for 3/27/20  03/06/2020 : TSH normal. Lipid Panel normal.  NFBS 114;  Cr.0.8;  Ca 10.0; bili 0.4  Liver enzymes normal. eGFR > 60.  Hgb 15.3; Hct 46.7; MCV 97 Platelets 213,000 ANC 6,100    02/13/2020 : PET/CT  Decrease in size and metabolic activity Left upper lobe mass. Improvement in Consolidation and atelectasis. Improvement and near resolution of metabolic activity in right sixth and ninth ribs. No new areas of metabolic activity.      Dx : Chemotherapy followup.  Poorly differentiated Squamous Cell carcinoma Left Upper Lobe. Mets to right ribs. Hypertension      Assessment & Plan:  Reviewed with patient, spouse. Check labs before cycle 8.  Medical followup with .   RTC 3 weeks with CBC,CMP, TSH, Lipids. Patient and wife  understand

## 2020-04-16 ENCOUNTER — OFFICE VISIT (OUTPATIENT)
Dept: HEMATOLOGY/ONCOLOGY | Facility: CLINIC | Age: 60
End: 2020-04-16
Payer: COMMERCIAL

## 2020-04-16 DIAGNOSIS — C34.02 LUNG CANCER, MAIN BRONCHUS, LEFT: Primary | ICD-10-CM

## 2020-04-16 DIAGNOSIS — Z09 CHEMOTHERAPY FOLLOW-UP EXAMINATION: ICD-10-CM

## 2020-04-16 DIAGNOSIS — C79.51 SECONDARY MALIGNANT NEOPLASM OF BONE: ICD-10-CM

## 2020-04-16 PROCEDURE — 99214 OFFICE O/P EST MOD 30 MIN: CPT | Mod: 95,,, | Performed by: INTERNAL MEDICINE

## 2020-04-16 PROCEDURE — 99214 PR OFFICE/OUTPT VISIT, EST, LEVL IV, 30-39 MIN: ICD-10-PCS | Mod: 95,,, | Performed by: INTERNAL MEDICINE

## 2020-04-17 ENCOUNTER — INFUSION (OUTPATIENT)
Dept: INFUSION THERAPY | Facility: HOSPITAL | Age: 60
End: 2020-04-17
Attending: INTERNAL MEDICINE
Payer: COMMERCIAL

## 2020-04-17 ENCOUNTER — LAB VISIT (OUTPATIENT)
Dept: LAB | Facility: HOSPITAL | Age: 60
End: 2020-04-17
Attending: FAMILY MEDICINE
Payer: COMMERCIAL

## 2020-04-17 VITALS
DIASTOLIC BLOOD PRESSURE: 67 MMHG | TEMPERATURE: 98 F | SYSTOLIC BLOOD PRESSURE: 111 MMHG | HEART RATE: 77 BPM | WEIGHT: 226 LBS | RESPIRATION RATE: 18 BRPM | BODY MASS INDEX: 32.43 KG/M2 | OXYGEN SATURATION: 98 %

## 2020-04-17 DIAGNOSIS — Z09 CHEMOTHERAPY FOLLOW-UP EXAMINATION: ICD-10-CM

## 2020-04-17 DIAGNOSIS — C34.02 LUNG CANCER, MAIN BRONCHUS, LEFT: ICD-10-CM

## 2020-04-17 DIAGNOSIS — C79.51 SECONDARY MALIGNANT NEOPLASM OF BONE: Primary | ICD-10-CM

## 2020-04-17 DIAGNOSIS — C79.51 SECONDARY MALIGNANT NEOPLASM OF BONE: ICD-10-CM

## 2020-04-17 LAB
ALBUMIN SERPL BCP-MCNC: 4.3 G/DL (ref 3.5–5.2)
ALP SERPL-CCNC: 77 U/L (ref 55–135)
ALT SERPL W/O P-5'-P-CCNC: 22 U/L (ref 10–44)
ANION GAP SERPL CALC-SCNC: 8 MMOL/L (ref 8–16)
AST SERPL-CCNC: 15 U/L (ref 10–40)
BASOPHILS # BLD AUTO: 0.1 K/UL (ref 0–0.2)
BASOPHILS NFR BLD: 0.9 % (ref 0–1.9)
BILIRUB SERPL-MCNC: 0.3 MG/DL (ref 0.1–1)
BUN SERPL-MCNC: 11 MG/DL (ref 6–20)
CALCIUM SERPL-MCNC: 9.7 MG/DL (ref 8.7–10.5)
CHLORIDE SERPL-SCNC: 104 MMOL/L (ref 95–110)
CO2 SERPL-SCNC: 28 MMOL/L (ref 23–29)
CREAT SERPL-MCNC: 0.9 MG/DL (ref 0.5–1.4)
DIFFERENTIAL METHOD: ABNORMAL
EOSINOPHIL # BLD AUTO: 0.4 K/UL (ref 0–0.5)
EOSINOPHIL NFR BLD: 3.8 % (ref 0–8)
ERYTHROCYTE [DISTWIDTH] IN BLOOD BY AUTOMATED COUNT: 13.7 % (ref 11.5–14.5)
EST. GFR  (AFRICAN AMERICAN): >60 ML/MIN/1.73 M^2
EST. GFR  (NON AFRICAN AMERICAN): >60 ML/MIN/1.73 M^2
GLUCOSE SERPL-MCNC: 116 MG/DL (ref 70–110)
HCT VFR BLD AUTO: 47 % (ref 40–54)
HGB BLD-MCNC: 15.4 G/DL (ref 14–18)
IMM GRANULOCYTES # BLD AUTO: 0.05 K/UL (ref 0–0.04)
IMM GRANULOCYTES NFR BLD AUTO: 0.5 % (ref 0–0.5)
LYMPHOCYTES # BLD AUTO: 2.1 K/UL (ref 1–4.8)
LYMPHOCYTES NFR BLD: 19.3 % (ref 18–48)
MCH RBC QN AUTO: 32 PG (ref 27–31)
MCHC RBC AUTO-ENTMCNC: 32.8 G/DL (ref 32–36)
MCV RBC AUTO: 98 FL (ref 82–98)
MONOCYTES # BLD AUTO: 1.1 K/UL (ref 0.3–1)
MONOCYTES NFR BLD: 10.3 % (ref 4–15)
NEUTROPHILS # BLD AUTO: 7.3 K/UL (ref 1.8–7.7)
NEUTROPHILS NFR BLD: 65.2 % (ref 38–73)
NRBC BLD-RTO: 0 /100 WBC
PLATELET # BLD AUTO: 239 K/UL (ref 150–350)
PMV BLD AUTO: 9.1 FL (ref 9.2–12.9)
POTASSIUM SERPL-SCNC: 4.8 MMOL/L (ref 3.5–5.1)
PROT SERPL-MCNC: 7.8 G/DL (ref 6–8.4)
RBC # BLD AUTO: 4.82 M/UL (ref 4.6–6.2)
SODIUM SERPL-SCNC: 140 MMOL/L (ref 136–145)
TSH SERPL DL<=0.005 MIU/L-ACNC: 0.97 UIU/ML (ref 0.4–4)
WBC # BLD AUTO: 11.1 K/UL (ref 3.9–12.7)

## 2020-04-17 PROCEDURE — 63600175 PHARM REV CODE 636 W HCPCS: Mod: JG | Performed by: INTERNAL MEDICINE

## 2020-04-17 PROCEDURE — 36415 COLL VENOUS BLD VENIPUNCTURE: CPT

## 2020-04-17 PROCEDURE — 84443 ASSAY THYROID STIM HORMONE: CPT

## 2020-04-17 PROCEDURE — 85025 COMPLETE CBC W/AUTO DIFF WBC: CPT

## 2020-04-17 PROCEDURE — 96413 CHEMO IV INFUSION 1 HR: CPT

## 2020-04-17 PROCEDURE — 25000003 PHARM REV CODE 250: Performed by: INTERNAL MEDICINE

## 2020-04-17 PROCEDURE — 80053 COMPREHEN METABOLIC PANEL: CPT

## 2020-04-17 RX ORDER — HEPARIN 100 UNIT/ML
500 SYRINGE INTRAVENOUS
Status: CANCELLED | OUTPATIENT
Start: 2020-04-17

## 2020-04-17 RX ORDER — SODIUM CHLORIDE 0.9 % (FLUSH) 0.9 %
10 SYRINGE (ML) INJECTION
Status: CANCELLED | OUTPATIENT
Start: 2020-04-17

## 2020-04-17 RX ADMIN — SODIUM CHLORIDE 200 MG: 9 INJECTION, SOLUTION INTRAVENOUS at 11:04

## 2020-04-17 NOTE — PLAN OF CARE
Patient arrived to unit for Keytruda infusion. Patient denies any new or worsening symptoms at this time. Plan of care reviewed, patient agreeable to plan. Patient tolerated treatment well. No sign of reaction noted. VSS. Patient received discharge instructions and follow up appointments. Patient instructed to return 5/6/20 for labs, 5/7/20 for Dr. Cruz follow up, and 5/8/20 for next infusion. Verbalized understanding and ambulated unassisted off unit, in NAD at time of discharge.

## 2020-05-04 DIAGNOSIS — Z13.9 SCREENING FOR CONDITION: Primary | ICD-10-CM

## 2020-05-04 NOTE — PROGRESS NOTES
05/04/2020      In an effort to protect our immunocompromised patients from potential exposure to COVID-19, [facility] will now require all patients receiving an infusion, an injection, and/or radiation therapy to be tested for COVID-19 prior to their appointment.  All patients currently under treatment will be tested immediately, and patients initiating new treatment cycles or with one-time appointments (injections, transfusions, etc.) must be tested within 72 hours of their appointment.     Placed COVID-19 test order for patient.  A member of our team is to contact the patient in the near future to explain this process and the rationale behind it, to ask the COVID-19 screening questions, and to get the patient scheduled for their COVID-19 test.     The above was completed in accordance with instructions and guidelines set forth by [facility].     Signed,    Blas Tate, MIKE     Date:  05/04/2020

## 2020-05-05 ENCOUNTER — TELEPHONE (OUTPATIENT)
Dept: ADMINISTRATIVE | Facility: CLINIC | Age: 60
End: 2020-05-05

## 2020-05-05 NOTE — PROGRESS NOTES
VIRTUAL AUDIO VISIT.  Patient Location : home  Reason for followup : Lung Cancer  Time Spent 15 mins      Chief Complaint :   Lung cancer    Hx of Present illness :  Patient is a 59 y.o. year old male who presents to the clinic today for   Chemotherapy followup.  Found to have obstructing lesion left main stem bronchus. 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  Started on  on concurrent chemoradiation.  Had two weeks of radiation. Symptomatic relief. Had allergic reaction to taxol week 2.  5 % of tumor cells were positive for PDL - 1.  Started on keytruda monotherapy . Completed cycle 8    Allergies :    Review of patient's allergies indicates:  No Known Allergies    Occupation :  .     Transfusion :  None    Menstrual & obstetric Hx :  N/A      Present Meds :   Medication List with Changes/Refills   Current Medications    ATENOLOL (TENORMIN) 25 MG TABLET    Take 1 tablet (25 mg total) by mouth once daily.    NICOTINE (NICODERM CQ) 21 MG/24 HR    Place 1 patch onto the skin every 24 hours.    ONDANSETRON (ZOFRAN) 8 MG TABLET    Take 1 tablet (8 mg total) by mouth every 8 (eight) hours as needed for Nausea.       Past Medical Hx :  No past hyx of DM,Hypertension, PUD, Hepatitis, liver disease, thyroid dysfuntion, CVA, seizure disorder. no9 hx of DVT or P)E>     Past Medical Hx :  Past Medical History:   Diagnosis Date    Hypertension     Lung mass     left lung       Travel Hx :  N/A    Immunization :  Immunization History   Administered Date(s) Administered    Influenza 09/20/2019    Pneumococcal Conjugate - 13 Valent 08/24/2019    Tdap 09/03/2019       Family Hx :  Family History   Problem Relation Age of Onset    Diabetes Mother     Heart defect Mother     Cancer Father     Cancer Sister     No Known Problems Son        Social Hx :  Started smoking  As a teen ager.  SMOKES   ONE ppd.  Social History     Socioeconomic  History    Marital status:      Spouse name: Not on file    Number of children: Not on file    Years of education: Not on file    Highest education level: Not on file   Occupational History    Not on file   Social Needs    Financial resource strain: Not very hard    Food insecurity:     Worry: Never true     Inability: Never true    Transportation needs:     Medical: No     Non-medical: No   Tobacco Use    Smoking status: Former Smoker     Packs/day: 1.00     Years: 25.00     Pack years: 25.00     Types: Cigarettes    Smokeless tobacco: Never Used   Substance and Sexual Activity    Alcohol use: Not on file     Comment: 8/30/19- quit 3 weeks ago    Drug use: Never    Sexual activity: Yes     Partners: Female   Lifestyle    Physical activity:     Days per week: 0 days     Minutes per session: 0 min    Stress: To some extent   Relationships    Social connections:     Talks on phone: Once a week     Gets together: Once a week     Attends Anglican service: Not on file     Active member of club or organization: No     Attends meetings of clubs or organizations: Never     Relationship status:    Other Topics Concern    Not on file   Social History Narrative    Not on file       Surgery :  Bronchoscopy. Has not had colonoscopy.    Symptoms :   Good appetite. No weight loss. Has sinus problems.  No fever, chills or cough. No skin rash or pruritis. No sorethroat.  No headache, dizziness, nausea or vomiting. No diarrhoea or constipation. No heartburn. No visual disturbances. No chest pain, palpitation or leg swelling. No abdominal or back pain. No joint pain. No epistaxis, bleeding gums, hematemesis, melena, hematuria or hhemoptysis. Sleep normal. No depression.    Physical Exam :  N/A.    Labs & Imaging :  05/06/2020 : ; Cr.0.3; Ca 9.4; bili 0.3 Liver enzymes normal. eGFR > 60.  TSH 1.264.  Hgb 14.5; Hct 45.3; MCV 91; Platelets 215,000 ANC 8,100.   COVID-- 19 screen Negative.    labs   3/27/20   : TSH normal. Lipid Panel normal.  NFBS 114;  Cr.0.8;  Ca 10.0; bili 0.4 Liver enzymes normal. eGFR > 60.  Hgb 15.3; Hct 46.7; MCV 97 Platelets 213,000 ANC 6,100    02/13/2020 : PET/CT  Decrease in size and metabolic activity Left upper lobe mass. Improvement in Consolidation and atelectasis. Improvement and near resolution of metabolic activity in right sixth and ninth ribs. No new areas of metabolic activity.      Dx : Chemotherapy followup.  Poorly differentiated Squamous Cell carcinoma Left Upper Lobe. Mets to right ribs. Hypertension      Assessment & Plan:  Reviewed with patient, spouse  Cleared for Rx on 6/8/2020.  RTC 3 weeks, with CBC,CMP,TSH. Patient understands and verbalised.

## 2020-05-05 NOTE — TELEPHONE ENCOUNTER
05/05/2020      Phoned the patient.      Discussed the following with the patient:  In an effort to protect our immunocompromised patients from potential exposure to COVID-19, [facility] will now require all patients receiving an infusion, an injection, and/or radiation therapy to be tested for COVID-19 prior to their appointment.  All patients currently under treatment will be tested immediately, and patients initiating new treatment cycles or with one-time appointments (injections, transfusions, etc.) must be tested within 72 hours of their appointment.      Symptom Patient's Response   Fever YES/NO: no   Cough YES/NO: no   Shortness of breath  YES/NO: no   Difficulty breathing YES/NO: no   GI symptoms such as diarrhea or nausea YES/NO: no   Loss of taste YES/NO: no   Loss of smell YES/NO: no     Other Screening Patient's Response   Has the patient previously undergone COVID-19 testing? YES/NO: no     If yes to the question directly above, what was the result? not applicable   Has the patient been in close contact with someone who has undergone COVID-19 testing? YES/NO: no     If yes to the question directly above, what was the result? not applicable      The patient's 24-hour COVID-19 test was ordered and scheduled.  Reviewed the appointment date, time, and location with the patient.      Advised the patient that he can expect the results to take approximately 24 hours.  Advised the patient that someone will reach out to him regarding his results if he tests positive, as his treatment appointment will be rescheduled if he tests positive for COVID-19.  Also advised the patient that if he does not hear back from our office or if he is told by our office he has tested negative for COVID-19, he can proceed with treatment as originally planned.     Questions were answered to the patient's satisfaction, and the patient verbalized understanding of information and agreement with the plan.       The above was completed in  accordance with instructions and guidelines set forth by [facility].     Signed,        Toma Johnson     Date:  05/05/2020

## 2020-05-06 ENCOUNTER — LAB VISIT (OUTPATIENT)
Dept: LAB | Facility: HOSPITAL | Age: 60
End: 2020-05-06
Attending: INTERNAL MEDICINE
Payer: COMMERCIAL

## 2020-05-06 DIAGNOSIS — Z09 CHEMOTHERAPY FOLLOW-UP EXAMINATION: ICD-10-CM

## 2020-05-06 DIAGNOSIS — C79.51 SECONDARY MALIGNANT NEOPLASM OF BONE: ICD-10-CM

## 2020-05-06 DIAGNOSIS — C34.02 LUNG CANCER, MAIN BRONCHUS, LEFT: ICD-10-CM

## 2020-05-06 LAB
ALBUMIN SERPL BCP-MCNC: 4 G/DL (ref 3.5–5.2)
ALP SERPL-CCNC: 72 U/L (ref 55–135)
ALT SERPL W/O P-5'-P-CCNC: 37 U/L (ref 10–44)
ANION GAP SERPL CALC-SCNC: 12 MMOL/L (ref 8–16)
AST SERPL-CCNC: 21 U/L (ref 10–40)
BILIRUB SERPL-MCNC: 0.3 MG/DL (ref 0.1–1)
BUN SERPL-MCNC: 10 MG/DL (ref 6–20)
CALCIUM SERPL-MCNC: 9.4 MG/DL (ref 8.7–10.5)
CHLORIDE SERPL-SCNC: 102 MMOL/L (ref 95–110)
CHOLEST SERPL-MCNC: 203 MG/DL (ref 120–199)
CHOLEST/HDLC SERPL: 3.8 {RATIO} (ref 2–5)
CO2 SERPL-SCNC: 24 MMOL/L (ref 23–29)
CREAT SERPL-MCNC: 0.8 MG/DL (ref 0.5–1.4)
ERYTHROCYTE [DISTWIDTH] IN BLOOD BY AUTOMATED COUNT: 13.2 % (ref 11.5–14.5)
EST. GFR  (AFRICAN AMERICAN): >60 ML/MIN/1.73 M^2
EST. GFR  (NON AFRICAN AMERICAN): >60 ML/MIN/1.73 M^2
GLUCOSE SERPL-MCNC: 119 MG/DL (ref 70–110)
HCT VFR BLD AUTO: 45.3 % (ref 40–54)
HDLC SERPL-MCNC: 53 MG/DL (ref 40–75)
HDLC SERPL: 26.1 % (ref 20–50)
HGB BLD-MCNC: 14.8 G/DL (ref 14–18)
IMM GRANULOCYTES # BLD AUTO: 0.11 K/UL (ref 0–0.04)
LDLC SERPL CALC-MCNC: 113.8 MG/DL (ref 63–159)
MCH RBC QN AUTO: 31.4 PG (ref 27–31)
MCHC RBC AUTO-ENTMCNC: 32.7 G/DL (ref 32–36)
MCV RBC AUTO: 96 FL (ref 82–98)
NEUTROPHILS # BLD AUTO: 8.1 K/UL (ref 1.8–7.7)
NONHDLC SERPL-MCNC: 150 MG/DL
PLATELET # BLD AUTO: 215 K/UL (ref 150–350)
PMV BLD AUTO: 9.4 FL (ref 9.2–12.9)
POTASSIUM SERPL-SCNC: 4.4 MMOL/L (ref 3.5–5.1)
PROT SERPL-MCNC: 7.8 G/DL (ref 6–8.4)
RBC # BLD AUTO: 4.71 M/UL (ref 4.6–6.2)
SODIUM SERPL-SCNC: 138 MMOL/L (ref 136–145)
TRIGL SERPL-MCNC: 181 MG/DL (ref 30–150)
TSH SERPL DL<=0.005 MIU/L-ACNC: 1.26 UIU/ML (ref 0.4–4)
WBC # BLD AUTO: 11.97 K/UL (ref 3.9–12.7)

## 2020-05-06 PROCEDURE — 80053 COMPREHEN METABOLIC PANEL: CPT

## 2020-05-06 PROCEDURE — 36415 COLL VENOUS BLD VENIPUNCTURE: CPT

## 2020-05-06 PROCEDURE — 85027 COMPLETE CBC AUTOMATED: CPT

## 2020-05-06 PROCEDURE — 84443 ASSAY THYROID STIM HORMONE: CPT

## 2020-05-06 PROCEDURE — 80061 LIPID PANEL: CPT

## 2020-05-07 ENCOUNTER — OFFICE VISIT (OUTPATIENT)
Dept: HEMATOLOGY/ONCOLOGY | Facility: CLINIC | Age: 60
End: 2020-05-07
Payer: COMMERCIAL

## 2020-05-07 DIAGNOSIS — Z09 CHEMOTHERAPY FOLLOW-UP EXAMINATION: ICD-10-CM

## 2020-05-07 DIAGNOSIS — C34.02 LUNG CANCER, MAIN BRONCHUS, LEFT: Primary | ICD-10-CM

## 2020-05-07 DIAGNOSIS — C79.51 SECONDARY MALIGNANT NEOPLASM OF BONE: ICD-10-CM

## 2020-05-07 PROCEDURE — 99214 OFFICE O/P EST MOD 30 MIN: CPT | Mod: 95,,, | Performed by: INTERNAL MEDICINE

## 2020-05-07 PROCEDURE — 99214 PR OFFICE/OUTPT VISIT, EST, LEVL IV, 30-39 MIN: ICD-10-PCS | Mod: 95,,, | Performed by: INTERNAL MEDICINE

## 2020-05-07 RX ORDER — HEPARIN 100 UNIT/ML
500 SYRINGE INTRAVENOUS
Status: CANCELLED | OUTPATIENT
Start: 2020-05-08

## 2020-05-07 RX ORDER — SODIUM CHLORIDE 0.9 % (FLUSH) 0.9 %
10 SYRINGE (ML) INJECTION
Status: CANCELLED | OUTPATIENT
Start: 2020-05-08

## 2020-05-08 ENCOUNTER — INFUSION (OUTPATIENT)
Dept: INFUSION THERAPY | Facility: HOSPITAL | Age: 60
End: 2020-05-08
Attending: INTERNAL MEDICINE
Payer: COMMERCIAL

## 2020-05-08 VITALS
WEIGHT: 242 LBS | SYSTOLIC BLOOD PRESSURE: 125 MMHG | DIASTOLIC BLOOD PRESSURE: 77 MMHG | BODY MASS INDEX: 34.72 KG/M2 | HEART RATE: 74 BPM | RESPIRATION RATE: 17 BRPM | TEMPERATURE: 99 F | OXYGEN SATURATION: 97 %

## 2020-05-08 DIAGNOSIS — C79.51 SECONDARY MALIGNANT NEOPLASM OF BONE: Primary | ICD-10-CM

## 2020-05-08 DIAGNOSIS — C34.02 LUNG CANCER, MAIN BRONCHUS, LEFT: ICD-10-CM

## 2020-05-08 PROCEDURE — 25000003 PHARM REV CODE 250: Performed by: INTERNAL MEDICINE

## 2020-05-08 PROCEDURE — 96413 CHEMO IV INFUSION 1 HR: CPT

## 2020-05-08 PROCEDURE — 63600175 PHARM REV CODE 636 W HCPCS: Mod: JG | Performed by: INTERNAL MEDICINE

## 2020-05-08 RX ADMIN — SODIUM CHLORIDE 200 MG: 9 INJECTION, SOLUTION INTRAVENOUS at 11:05

## 2020-05-08 NOTE — PLAN OF CARE
Patient arrived to unit for C10 Keytruda. Patient denies any new or worsening symptoms at this time. Plan of care reviewed, patient agreeable to plan. Patient tolerated treatment well. No sign of reaction noted. VSS. Patient received discharge instructions and follow up appointments. Patient instructed to return 5/27/20 for labs, 5/28/20 for Dr. Cruz follow up, and 5/29/20 for next infusion. Verbalized understanding and ambulated unassisted off unit. Patient in NAD at time of discharge.

## 2020-05-18 PROBLEM — Z09 CHEMOTHERAPY FOLLOW-UP EXAMINATION: Status: RESOLVED | Noted: 2020-02-14 | Resolved: 2020-05-18

## 2020-05-21 ENCOUNTER — TELEPHONE (OUTPATIENT)
Dept: HEMATOLOGY/ONCOLOGY | Facility: CLINIC | Age: 60
End: 2020-05-21

## 2020-05-21 DIAGNOSIS — C34.02 LUNG CANCER, MAIN BRONCHUS, LEFT: Primary | ICD-10-CM

## 2020-05-22 ENCOUNTER — LAB VISIT (OUTPATIENT)
Dept: FAMILY MEDICINE | Facility: CLINIC | Age: 60
End: 2020-05-22
Payer: COMMERCIAL

## 2020-05-22 DIAGNOSIS — C34.02 LUNG CANCER, MAIN BRONCHUS, LEFT: ICD-10-CM

## 2020-05-22 PROCEDURE — U0003 INFECTIOUS AGENT DETECTION BY NUCLEIC ACID (DNA OR RNA); SEVERE ACUTE RESPIRATORY SYNDROME CORONAVIRUS 2 (SARS-COV-2) (CORONAVIRUS DISEASE [COVID-19]), AMPLIFIED PROBE TECHNIQUE, MAKING USE OF HIGH THROUGHPUT TECHNOLOGIES AS DESCRIBED BY CMS-2020-01-R: HCPCS

## 2020-05-23 LAB — SARS-COV-2 RNA RESP QL NAA+PROBE: NOT DETECTED

## 2020-05-27 ENCOUNTER — LAB VISIT (OUTPATIENT)
Dept: LAB | Facility: HOSPITAL | Age: 60
End: 2020-05-27
Attending: INTERNAL MEDICINE
Payer: COMMERCIAL

## 2020-05-27 DIAGNOSIS — Z09 CHEMOTHERAPY FOLLOW-UP EXAMINATION: ICD-10-CM

## 2020-05-27 DIAGNOSIS — C34.02 LUNG CANCER, MAIN BRONCHUS, LEFT: ICD-10-CM

## 2020-05-27 DIAGNOSIS — C79.51 SECONDARY MALIGNANT NEOPLASM OF BONE: ICD-10-CM

## 2020-05-27 LAB
ALBUMIN SERPL BCP-MCNC: 4.2 G/DL (ref 3.5–5.2)
ALP SERPL-CCNC: 64 U/L (ref 55–135)
ALT SERPL W/O P-5'-P-CCNC: 32 U/L (ref 10–44)
ANION GAP SERPL CALC-SCNC: 9 MMOL/L (ref 8–16)
AST SERPL-CCNC: 18 U/L (ref 10–40)
BASOPHILS # BLD AUTO: 0.12 K/UL (ref 0–0.2)
BASOPHILS NFR BLD: 1.2 % (ref 0–1.9)
BILIRUB SERPL-MCNC: 0.3 MG/DL (ref 0.1–1)
BUN SERPL-MCNC: 9 MG/DL (ref 6–20)
CALCIUM SERPL-MCNC: 9.7 MG/DL (ref 8.7–10.5)
CHLORIDE SERPL-SCNC: 102 MMOL/L (ref 95–110)
CO2 SERPL-SCNC: 27 MMOL/L (ref 23–29)
CREAT SERPL-MCNC: 0.8 MG/DL (ref 0.5–1.4)
DIFFERENTIAL METHOD: ABNORMAL
EOSINOPHIL # BLD AUTO: 0.4 K/UL (ref 0–0.5)
EOSINOPHIL NFR BLD: 4.2 % (ref 0–8)
ERYTHROCYTE [DISTWIDTH] IN BLOOD BY AUTOMATED COUNT: 13.3 % (ref 11.5–14.5)
EST. GFR  (AFRICAN AMERICAN): >60 ML/MIN/1.73 M^2
EST. GFR  (NON AFRICAN AMERICAN): >60 ML/MIN/1.73 M^2
GLUCOSE SERPL-MCNC: 94 MG/DL (ref 70–110)
HCT VFR BLD AUTO: 46.2 % (ref 40–54)
HGB BLD-MCNC: 15.2 G/DL (ref 14–18)
IMM GRANULOCYTES # BLD AUTO: 0.09 K/UL (ref 0–0.04)
IMM GRANULOCYTES NFR BLD AUTO: 0.9 % (ref 0–0.5)
LYMPHOCYTES # BLD AUTO: 2.4 K/UL (ref 1–4.8)
LYMPHOCYTES NFR BLD: 23.4 % (ref 18–48)
MCH RBC QN AUTO: 31.7 PG (ref 27–31)
MCHC RBC AUTO-ENTMCNC: 32.9 G/DL (ref 32–36)
MCV RBC AUTO: 96 FL (ref 82–98)
MONOCYTES # BLD AUTO: 1.4 K/UL (ref 0.3–1)
MONOCYTES NFR BLD: 13.5 % (ref 4–15)
NEUTROPHILS # BLD AUTO: 5.8 K/UL (ref 1.8–7.7)
NEUTROPHILS NFR BLD: 56.8 % (ref 38–73)
NRBC BLD-RTO: 0 /100 WBC
PLATELET # BLD AUTO: 264 K/UL (ref 150–350)
PMV BLD AUTO: 9.3 FL (ref 9.2–12.9)
POTASSIUM SERPL-SCNC: 4.5 MMOL/L (ref 3.5–5.1)
PROT SERPL-MCNC: 7.7 G/DL (ref 6–8.4)
RBC # BLD AUTO: 4.8 M/UL (ref 4.6–6.2)
SODIUM SERPL-SCNC: 138 MMOL/L (ref 136–145)
TSH SERPL DL<=0.005 MIU/L-ACNC: 1.44 UIU/ML (ref 0.4–4)
WBC # BLD AUTO: 10.14 K/UL (ref 3.9–12.7)

## 2020-05-27 PROCEDURE — 36415 COLL VENOUS BLD VENIPUNCTURE: CPT | Mod: PO

## 2020-05-27 PROCEDURE — 85025 COMPLETE CBC W/AUTO DIFF WBC: CPT

## 2020-05-27 PROCEDURE — 84443 ASSAY THYROID STIM HORMONE: CPT

## 2020-05-27 PROCEDURE — 80053 COMPREHEN METABOLIC PANEL: CPT

## 2020-05-27 NOTE — PROGRESS NOTES
Chief Complaint :   Lung cancer    Hx of Present illness :  Patient is a 59 y.o. year old male who presents to the clinic today for   Chemotherapy followup.  Found to have obstructing lesion left main stem bronchus. 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  Started on  on concurrent chemoradiation.  Had two weeks of radiation. Symptomatic relief. Had allergic reaction to taxol week 2.  5 % of tumor cells were positive for PDL - 1.  Started on keytruda monotherapy . Completed cycle 9    Allergies :    Review of patient's allergies indicates:  No Known Allergies    Occupation :  .     Transfusion :  None    Menstrual & obstetric Hx :  N/A      Present Meds :   Medication List with Changes/Refills   Current Medications    ATENOLOL (TENORMIN) 25 MG TABLET    Take 1 tablet (25 mg total) by mouth once daily.    NICOTINE (NICODERM CQ) 21 MG/24 HR    Place 1 patch onto the skin every 24 hours.    ONDANSETRON (ZOFRAN) 8 MG TABLET    Take 1 tablet (8 mg total) by mouth every 8 (eight) hours as needed for Nausea.       Past Medical Hx :  No past hyx of DM,Hypertension, PUD, Hepatitis, liver disease, thyroid dysfuntion, CVA, seizure disorder. no9 hx of DVT or P)E>     Past Medical Hx :  Past Medical History:   Diagnosis Date    Hypertension     Lung mass     left lung       Travel Hx :  N/A    Immunization :  Immunization History   Administered Date(s) Administered    Influenza 09/20/2019    Pneumococcal Conjugate - 13 Valent 08/24/2019    Tdap 09/03/2019       Family Hx :  Family History   Problem Relation Age of Onset    Diabetes Mother     Heart defect Mother     Cancer Father     Cancer Sister     No Known Problems Son        Social Hx :  Started smoking  As a teen ager.  SMOKES   ONE ppd.  Social History     Socioeconomic History    Marital status:      Spouse name: Not on file    Number of children: Not on file     Years of education: Not on file    Highest education level: Not on file   Occupational History    Not on file   Social Needs    Financial resource strain: Not very hard    Food insecurity:     Worry: Never true     Inability: Never true    Transportation needs:     Medical: No     Non-medical: No   Tobacco Use    Smoking status: Former Smoker     Packs/day: 1.00     Years: 25.00     Pack years: 25.00     Types: Cigarettes    Smokeless tobacco: Never Used   Substance and Sexual Activity    Alcohol use: Not on file     Comment: 8/30/19- quit 3 weeks ago    Drug use: Never    Sexual activity: Yes     Partners: Female   Lifestyle    Physical activity:     Days per week: 0 days     Minutes per session: 0 min    Stress: To some extent   Relationships    Social connections:     Talks on phone: Once a week     Gets together: Once a week     Attends Oriental orthodox service: Not on file     Active member of club or organization: No     Attends meetings of clubs or organizations: Never     Relationship status:    Other Topics Concern    Not on file   Social History Narrative    Not on file       Surgery :  Bronchoscopy. Has not had colonoscopy.    Symptoms :   Good appetite. No weight loss. Has sinus problems.  No fever, chills or cough. No skin rash or pruritis. No sorethroat.  No headache, dizziness, nausea or vomiting. No diarrhoea or constipation. No heartburn. No visual disturbances. No chest pain, palpitation or leg swelling. No abdominal or back pain. No joint pain. No epistaxis, bleeding gums, hematemesis, melena, hematuria or hhemoptysis. Sleep normal. No depression.    Physical Exam :  N/A.    Labs & Imaging :  05/27/2020 :  WBC 10,140; Hgb 15; hct 40.2; MCV 96; Platelets 264,000; ANC 5,600. TSH 1.438.  NFBS 94; Cr.0.8; eGFR >60. Bili 0.3. Liver enzymes normal.         05/06/2020 : ; Cr.0.3; Ca 9.4; bili 0.3 Liver enzymes normal. eGFR > 60.  TSH 1.264.  Hgb 14.5; Hct 45.3; MCV 91; Platelets  215,000 ANC 8,100.   COVID-- 19 screen Negative.    labs  3/27/20   : TSH normal. Lipid Panel normal.  NFBS 114;  Cr.0.8;  Ca 10.0; bili 0.4 Liver enzymes normal. eGFR > 60.  Hgb 15.3; Hct 46.7; MCV 97 Platelets 213,000 ANC 6,100    02/13/2020 : PET/CT  Decrease in size and metabolic activity Left upper lobe mass. Improvement in Consolidation and atelectasis. Improvement and near resolution of metabolic activity in right sixth and ninth ribs. No new areas of metabolic activity.      Dx : Chemotherapy followup.  Poorly differentiated Squamous Cell carcinoma Left Upper Lobe. Mets to right ribs. Hypertension      Assessment & Plan:  Reviewed with patient.   Cleared for Rx on 05/29/2020.  RTC 3 weeks, with CBC,CMP,TSH. Patient understands and verbalised.        Established Patient - Audio Only Telehealth Visit     The patient location is:  Home  The chief complaint leading to consultation is:  Lung cancer  Visit type: Virtual visit with audio only (telephone)  Total time spent with patient: 15 Mins       The reason for the audio only service rather than synchronous audio and video virtual visit was related to technical difficulties or patient preference/necessity.     Each patient to whom I provide medical services by telemedicine is:  (1) informed of the relationship between the physician and patient and the respective role of any other health care provider with respect to management of the patient; and (2) notified that they may decline to receive medical services by telemedicine and may withdraw from such care at any time. Patient verbally consented to receive this service via voice-only telephone call.                             This service was not originating from a related E/M service provided within the previous 7 days nor will  to an E/M service or procedure within the next 24 hours or my soonest available appointment.  Prevailing standard of care was able to be met in this audio-only visit.

## 2020-05-28 ENCOUNTER — OFFICE VISIT (OUTPATIENT)
Dept: HEMATOLOGY/ONCOLOGY | Facility: CLINIC | Age: 60
End: 2020-05-28
Payer: COMMERCIAL

## 2020-05-28 DIAGNOSIS — C79.51 SECONDARY MALIGNANT NEOPLASM OF BONE: ICD-10-CM

## 2020-05-28 DIAGNOSIS — Z09 CHEMOTHERAPY FOLLOW-UP EXAMINATION: ICD-10-CM

## 2020-05-28 DIAGNOSIS — C34.02 LUNG CANCER, MAIN BRONCHUS, LEFT: Primary | ICD-10-CM

## 2020-05-28 PROCEDURE — 99214 OFFICE O/P EST MOD 30 MIN: CPT | Mod: 95,,, | Performed by: INTERNAL MEDICINE

## 2020-05-28 PROCEDURE — 99214 PR OFFICE/OUTPT VISIT, EST, LEVL IV, 30-39 MIN: ICD-10-PCS | Mod: 95,,, | Performed by: INTERNAL MEDICINE

## 2020-05-28 RX ORDER — SODIUM CHLORIDE 0.9 % (FLUSH) 0.9 %
10 SYRINGE (ML) INJECTION
Status: CANCELLED | OUTPATIENT
Start: 2020-05-29

## 2020-05-28 RX ORDER — HEPARIN 100 UNIT/ML
500 SYRINGE INTRAVENOUS
Status: CANCELLED | OUTPATIENT
Start: 2020-05-29

## 2020-05-29 ENCOUNTER — INFUSION (OUTPATIENT)
Dept: INFUSION THERAPY | Facility: HOSPITAL | Age: 60
End: 2020-05-29
Attending: INTERNAL MEDICINE
Payer: COMMERCIAL

## 2020-05-29 VITALS
SYSTOLIC BLOOD PRESSURE: 129 MMHG | TEMPERATURE: 98 F | DIASTOLIC BLOOD PRESSURE: 77 MMHG | OXYGEN SATURATION: 96 % | HEART RATE: 69 BPM | RESPIRATION RATE: 17 BRPM

## 2020-05-29 DIAGNOSIS — C34.02 LUNG CANCER, MAIN BRONCHUS, LEFT: ICD-10-CM

## 2020-05-29 DIAGNOSIS — C79.51 SECONDARY MALIGNANT NEOPLASM OF BONE: Primary | ICD-10-CM

## 2020-05-29 PROCEDURE — 25000003 PHARM REV CODE 250: Performed by: INTERNAL MEDICINE

## 2020-05-29 PROCEDURE — 63600175 PHARM REV CODE 636 W HCPCS: Mod: JG | Performed by: INTERNAL MEDICINE

## 2020-05-29 PROCEDURE — 96413 CHEMO IV INFUSION 1 HR: CPT

## 2020-05-29 RX ADMIN — SODIUM CHLORIDE 200 MG: 9 INJECTION, SOLUTION INTRAVENOUS at 11:05

## 2020-05-29 NOTE — PLAN OF CARE
Pt arrived to unit. VSS. Pt afebrile. Pt cleared for treatment per Dr. Cruz. Pt denies side effects from Keytruda. Tolerated infusion. No reactions noted. AVS given to pt. Pt ambulatory, discharged from unit. No distress noted.

## 2020-06-16 ENCOUNTER — LAB VISIT (OUTPATIENT)
Dept: LAB | Facility: HOSPITAL | Age: 60
End: 2020-06-16
Attending: INTERNAL MEDICINE
Payer: COMMERCIAL

## 2020-06-16 DIAGNOSIS — C34.02 LUNG CANCER, MAIN BRONCHUS, LEFT: ICD-10-CM

## 2020-06-16 DIAGNOSIS — C79.51 SECONDARY MALIGNANT NEOPLASM OF BONE: ICD-10-CM

## 2020-06-16 DIAGNOSIS — Z09 CHEMOTHERAPY FOLLOW-UP EXAMINATION: ICD-10-CM

## 2020-06-16 LAB
ALBUMIN SERPL BCP-MCNC: 4.3 G/DL (ref 3.5–5.2)
ALP SERPL-CCNC: 81 U/L (ref 55–135)
ALT SERPL W/O P-5'-P-CCNC: 33 U/L (ref 10–44)
ANION GAP SERPL CALC-SCNC: 6 MMOL/L (ref 8–16)
AST SERPL-CCNC: 18 U/L (ref 10–40)
BASOPHILS # BLD AUTO: 0.09 K/UL (ref 0–0.2)
BASOPHILS NFR BLD: 0.8 % (ref 0–1.9)
BILIRUB SERPL-MCNC: 0.2 MG/DL (ref 0.1–1)
BUN SERPL-MCNC: 10 MG/DL (ref 6–20)
CALCIUM SERPL-MCNC: 9.4 MG/DL (ref 8.7–10.5)
CHLORIDE SERPL-SCNC: 103 MMOL/L (ref 95–110)
CO2 SERPL-SCNC: 31 MMOL/L (ref 23–29)
CREAT SERPL-MCNC: 0.9 MG/DL (ref 0.5–1.4)
DIFFERENTIAL METHOD: ABNORMAL
EOSINOPHIL # BLD AUTO: 0.5 K/UL (ref 0–0.5)
EOSINOPHIL NFR BLD: 4.3 % (ref 0–8)
ERYTHROCYTE [DISTWIDTH] IN BLOOD BY AUTOMATED COUNT: 13.2 % (ref 11.5–14.5)
EST. GFR  (AFRICAN AMERICAN): >60 ML/MIN/1.73 M^2
EST. GFR  (NON AFRICAN AMERICAN): >60 ML/MIN/1.73 M^2
GLUCOSE SERPL-MCNC: 111 MG/DL (ref 70–110)
HCT VFR BLD AUTO: 46.5 % (ref 40–54)
HGB BLD-MCNC: 15.1 G/DL (ref 14–18)
IMM GRANULOCYTES # BLD AUTO: 0.07 K/UL (ref 0–0.04)
IMM GRANULOCYTES NFR BLD AUTO: 0.7 % (ref 0–0.5)
LYMPHOCYTES # BLD AUTO: 2.6 K/UL (ref 1–4.8)
LYMPHOCYTES NFR BLD: 24.5 % (ref 18–48)
MCH RBC QN AUTO: 31.3 PG (ref 27–31)
MCHC RBC AUTO-ENTMCNC: 32.5 G/DL (ref 32–36)
MCV RBC AUTO: 96 FL (ref 82–98)
MONOCYTES # BLD AUTO: 1.4 K/UL (ref 0.3–1)
MONOCYTES NFR BLD: 12.8 % (ref 4–15)
NEUTROPHILS # BLD AUTO: 6.1 K/UL (ref 1.8–7.7)
NEUTROPHILS NFR BLD: 56.9 % (ref 38–73)
NRBC BLD-RTO: 0 /100 WBC
PLATELET # BLD AUTO: 250 K/UL (ref 150–350)
PMV BLD AUTO: 9.9 FL (ref 9.2–12.9)
POTASSIUM SERPL-SCNC: 3.9 MMOL/L (ref 3.5–5.1)
PROT SERPL-MCNC: 7.7 G/DL (ref 6–8.4)
RBC # BLD AUTO: 4.83 M/UL (ref 4.6–6.2)
SODIUM SERPL-SCNC: 140 MMOL/L (ref 136–145)
TSH SERPL DL<=0.005 MIU/L-ACNC: 1.91 UIU/ML (ref 0.4–4)
WBC # BLD AUTO: 10.7 K/UL (ref 3.9–12.7)

## 2020-06-16 PROCEDURE — 85025 COMPLETE CBC W/AUTO DIFF WBC: CPT

## 2020-06-16 PROCEDURE — 36415 COLL VENOUS BLD VENIPUNCTURE: CPT | Mod: PO

## 2020-06-16 PROCEDURE — 84443 ASSAY THYROID STIM HORMONE: CPT

## 2020-06-16 PROCEDURE — 80053 COMPREHEN METABOLIC PANEL: CPT

## 2020-06-16 NOTE — PROGRESS NOTES
Chief Complaint :   Lung cancer    Hx of Present illness :  Patient is a 59 y.o. year old male who presents to the clinic today for   Chemotherapy followup.  Found to have obstructing lesion left main stem bronchus. 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  Started on  on concurrent chemoradiation.  Had two weeks of radiation. Symptomatic relief. Had allergic reaction to taxol week 2.  5 % of tumor cells were positive for PDL - 1.  Started on keytruda monotherapy . Completed cycle 9    Allergies :    Review of patient's allergies indicates:  No Known Allergies    Occupation :  .     Transfusion :  None    Menstrual & obstetric Hx :  N/A      Present Meds :   Medication List with Changes/Refills   Current Medications    ATENOLOL (TENORMIN) 25 MG TABLET    Take 1 tablet (25 mg total) by mouth once daily.    NICOTINE (NICODERM CQ) 21 MG/24 HR    Place 1 patch onto the skin every 24 hours.    ONDANSETRON (ZOFRAN) 8 MG TABLET    Take 1 tablet (8 mg total) by mouth every 8 (eight) hours as needed for Nausea.       Past Medical Hx :  No past hyx of DM,Hypertension, PUD, Hepatitis, liver disease, thyroid dysfuntion, CVA, seizure disorder. no9 hx of DVT or P)E>     Past Medical Hx :  Past Medical History:   Diagnosis Date    Hypertension     Lung cancer     Lung mass     left lung       Travel Hx :  N/A    Immunization :  Immunization History   Administered Date(s) Administered    Influenza 09/20/2019    Pneumococcal Conjugate - 13 Valent 08/24/2019    Tdap 09/03/2019       Family Hx :  Family History   Problem Relation Age of Onset    Diabetes Mother     Heart defect Mother     Cancer Father     Cancer Sister     No Known Problems Son        Social Hx :  Started smoking  As a teen ager.  SMOKES   ONE ppd.  Social History     Socioeconomic History    Marital status:      Spouse name: Not on file    Number of  children: Not on file    Years of education: Not on file    Highest education level: Not on file   Occupational History    Not on file   Social Needs    Financial resource strain: Not very hard    Food insecurity     Worry: Never true     Inability: Never true    Transportation needs     Medical: No     Non-medical: No   Tobacco Use    Smoking status: Former Smoker     Packs/day: 1.00     Years: 25.00     Pack years: 25.00     Types: Cigarettes    Smokeless tobacco: Never Used   Substance and Sexual Activity    Alcohol Use     Frequency: Monthly or less     Drinks per session: 3 or 4     Binge frequency: Less than monthly     Comment: 8/30/19- quit 3 weeks ago    Drug use: Never    Sexual activity: Yes     Partners: Female   Lifestyle    Physical activity     Days per week: 0 days     Minutes per session: 0 min    Stress: To some extent   Relationships    Social connections     Talks on phone: Once a week     Gets together: Once a week     Attends Baptist service: Not on file     Active member of club or organization: No     Attends meetings of clubs or organizations: Never     Relationship status:    Other Topics Concern    Not on file   Social History Narrative    Not on file       Surgery :  Bronchoscopy. Has not had colonoscopy.    Symptoms :   Good appetite. No weight loss. Has sinus problems.  No fever, chills or cough. No skin rash or pruritis. No sorethroat.  No headache, dizziness, nausea or vomiting. No diarrhoea or constipation. No heartburn. No visual disturbances. No chest pain, palpitation or leg swelling. No abdominal or back pain. No joint pain. No epistaxis, bleeding gums, hematemesis, melena, hematuria or hhemoptysis. Sleep normal. No depression.    Physical Exam :  N/A.    Labs & Imaging :  06/16/2020 : TSH 1.907; NFBS 111; Cr.0.9; eGFR >60. Ca 9.4; Bili 0.2; Liver enzymes normal;  WBC 10,700, ANC 6,100. Platelets 250,000; Hgb 15.1; hct 46.5      05/27/2020 :  WBC 10,140;  Hgb 15; hct 40.2; MCV 96; Platelets 264,000; ANC 5,600. TSH 1.438.  NFBS 94; Cr.0.8; eGFR >60. Bili 0.3. Liver enzymes normal.         05/06/2020 : ; Cr.0.3; Ca 9.4; bili 0.3 Liver enzymes normal. eGFR > 60.  TSH 1.264.  Hgb 14.5; Hct 45.3; MCV 91; Platelets 215,000 ANC 8,100.   COVID-- 19 screen Negative.    labs  3/27/20   : TSH normal. Lipid Panel normal.  NFBS 114;  Cr.0.8;  Ca 10.0; bili 0.4 Liver enzymes normal. eGFR > 60.  Hgb 15.3; Hct 46.7; MCV 97 Platelets 213,000 ANC 6,100    02/13/2020 : PET/CT  Decrease in size and metabolic activity Left upper lobe mass. Improvement in Consolidation and atelectasis. Improvement and near resolution of metabolic activity in right sixth and ninth ribs. No new areas of metabolic activity.      Dx : Chemotherapy followup.  Poorly differentiated Squamous Cell carcinoma Left Upper Lobe. Mets to right ribs. Hypertension      Assessment & Plan:  Reviewed with patient.   Cleared for Rx on 06/19/20.  RTC 3 weeks, with CBC,CMP,TSH. Patient understands and verbalised.        Established Patient - Audio Only Telehealth Visit     The patient location is:  Home  The chief complaint leading to consultation is:  Lung cancer  Visit type: Virtual visit with audio only (telephone)  Total time spent with patient: 15 Mins       The reason for the audio only service rather than synchronous audio and video virtual visit was related to technical difficulties or patient preference/necessity.     Each patient to whom I provide medical services by telemedicine is:  (1) informed of the relationship between the physician and patient and the respective role of any other health care provider with respect to management of the patient; and (2) notified that they may decline to receive medical services by telemedicine and may withdraw from such care at any time. Patient verbally consented to receive this service via voice-only telephone call.        This service was not originating from a related  E/M service provided within the previous 7 days nor will  to an E/M service or procedure within the next 24 hours or my soonest available appointment.  Prevailing standard of care was able to be met in this audio-only visit.

## 2020-06-18 ENCOUNTER — OFFICE VISIT (OUTPATIENT)
Dept: HEMATOLOGY/ONCOLOGY | Facility: CLINIC | Age: 60
End: 2020-06-18
Payer: COMMERCIAL

## 2020-06-18 DIAGNOSIS — C79.51 SECONDARY MALIGNANT NEOPLASM OF BONE: ICD-10-CM

## 2020-06-18 DIAGNOSIS — C34.02 LUNG CANCER, MAIN BRONCHUS, LEFT: Primary | ICD-10-CM

## 2020-06-18 DIAGNOSIS — Z09 CHEMOTHERAPY FOLLOW-UP EXAMINATION: ICD-10-CM

## 2020-06-18 PROCEDURE — 99214 PR OFFICE/OUTPT VISIT, EST, LEVL IV, 30-39 MIN: ICD-10-PCS | Mod: 95,,, | Performed by: INTERNAL MEDICINE

## 2020-06-18 PROCEDURE — 99214 OFFICE O/P EST MOD 30 MIN: CPT | Mod: 95,,, | Performed by: INTERNAL MEDICINE

## 2020-06-18 RX ORDER — HEPARIN 100 UNIT/ML
500 SYRINGE INTRAVENOUS
Status: CANCELLED | OUTPATIENT
Start: 2020-06-19

## 2020-06-18 RX ORDER — SODIUM CHLORIDE 0.9 % (FLUSH) 0.9 %
10 SYRINGE (ML) INJECTION
Status: CANCELLED | OUTPATIENT
Start: 2020-06-19

## 2020-06-19 ENCOUNTER — INFUSION (OUTPATIENT)
Dept: INFUSION THERAPY | Facility: HOSPITAL | Age: 60
End: 2020-06-19
Attending: INTERNAL MEDICINE
Payer: COMMERCIAL

## 2020-06-19 VITALS
WEIGHT: 250 LBS | HEART RATE: 71 BPM | BODY MASS INDEX: 35 KG/M2 | RESPIRATION RATE: 17 BRPM | TEMPERATURE: 98 F | HEIGHT: 71 IN | DIASTOLIC BLOOD PRESSURE: 69 MMHG | OXYGEN SATURATION: 96 % | SYSTOLIC BLOOD PRESSURE: 117 MMHG

## 2020-06-19 DIAGNOSIS — C34.02 LUNG CANCER, MAIN BRONCHUS, LEFT: ICD-10-CM

## 2020-06-19 DIAGNOSIS — C79.51 SECONDARY MALIGNANT NEOPLASM OF BONE: Primary | ICD-10-CM

## 2020-06-19 PROCEDURE — 63600175 PHARM REV CODE 636 W HCPCS: Mod: JG | Performed by: INTERNAL MEDICINE

## 2020-06-19 PROCEDURE — 96413 CHEMO IV INFUSION 1 HR: CPT

## 2020-06-19 PROCEDURE — 25000003 PHARM REV CODE 250: Performed by: INTERNAL MEDICINE

## 2020-06-19 RX ADMIN — SODIUM CHLORIDE 200 MG: 9 INJECTION, SOLUTION INTRAVENOUS at 11:06

## 2020-06-19 NOTE — PLAN OF CARE
Patient arrived to unit for C12 Keytruda. Patient denies any new or worsening symptoms at this time. Plan of care reviewed, patient agreeable to plan. Patient tolerated treatment well. No sign of reaction noted. VSS. Patient received discharge instructions and follow up appointments. Patient instructed to return 7/8/20 for labs, 7/9/20 for Dr. Cruz follow up, and 7/10/20 for chemo. Verbalized understanding and ambulated unassisted off unit. Patient in NAD at time of discharge.

## 2020-07-08 ENCOUNTER — LAB VISIT (OUTPATIENT)
Dept: LAB | Facility: HOSPITAL | Age: 60
End: 2020-07-08
Attending: INTERNAL MEDICINE
Payer: COMMERCIAL

## 2020-07-08 DIAGNOSIS — Z09 CHEMOTHERAPY FOLLOW-UP EXAMINATION: ICD-10-CM

## 2020-07-08 DIAGNOSIS — C34.02 LUNG CANCER, MAIN BRONCHUS, LEFT: ICD-10-CM

## 2020-07-08 DIAGNOSIS — C79.51 SECONDARY MALIGNANT NEOPLASM OF BONE: ICD-10-CM

## 2020-07-08 LAB
ALBUMIN SERPL BCP-MCNC: 4.1 G/DL (ref 3.5–5.2)
ALP SERPL-CCNC: 76 U/L (ref 55–135)
ALT SERPL W/O P-5'-P-CCNC: 35 U/L (ref 10–44)
ANION GAP SERPL CALC-SCNC: 10 MMOL/L (ref 8–16)
AST SERPL-CCNC: 21 U/L (ref 10–40)
BASOPHILS # BLD AUTO: 0.1 K/UL (ref 0–0.2)
BASOPHILS NFR BLD: 1 % (ref 0–1.9)
BILIRUB SERPL-MCNC: 0.2 MG/DL (ref 0.1–1)
BUN SERPL-MCNC: 10 MG/DL (ref 6–20)
CALCIUM SERPL-MCNC: 9.3 MG/DL (ref 8.7–10.5)
CHLORIDE SERPL-SCNC: 102 MMOL/L (ref 95–110)
CO2 SERPL-SCNC: 27 MMOL/L (ref 23–29)
CREAT SERPL-MCNC: 0.9 MG/DL (ref 0.5–1.4)
DIFFERENTIAL METHOD: ABNORMAL
EOSINOPHIL # BLD AUTO: 0.5 K/UL (ref 0–0.5)
EOSINOPHIL NFR BLD: 4.6 % (ref 0–8)
ERYTHROCYTE [DISTWIDTH] IN BLOOD BY AUTOMATED COUNT: 13.1 % (ref 11.5–14.5)
EST. GFR  (AFRICAN AMERICAN): >60 ML/MIN/1.73 M^2
EST. GFR  (NON AFRICAN AMERICAN): >60 ML/MIN/1.73 M^2
GLUCOSE SERPL-MCNC: 173 MG/DL (ref 70–110)
HCT VFR BLD AUTO: 45.5 % (ref 40–54)
HGB BLD-MCNC: 15.1 G/DL (ref 14–18)
IMM GRANULOCYTES # BLD AUTO: 0.08 K/UL (ref 0–0.04)
IMM GRANULOCYTES NFR BLD AUTO: 0.8 % (ref 0–0.5)
LYMPHOCYTES # BLD AUTO: 2.8 K/UL (ref 1–4.8)
LYMPHOCYTES NFR BLD: 27.2 % (ref 18–48)
MCH RBC QN AUTO: 31.5 PG (ref 27–31)
MCHC RBC AUTO-ENTMCNC: 33.2 G/DL (ref 32–36)
MCV RBC AUTO: 95 FL (ref 82–98)
MONOCYTES # BLD AUTO: 1.2 K/UL (ref 0.3–1)
MONOCYTES NFR BLD: 11.4 % (ref 4–15)
NEUTROPHILS # BLD AUTO: 5.6 K/UL (ref 1.8–7.7)
NEUTROPHILS NFR BLD: 55 % (ref 38–73)
NRBC BLD-RTO: 0 /100 WBC
PLATELET # BLD AUTO: 242 K/UL (ref 150–350)
PMV BLD AUTO: 9.8 FL (ref 9.2–12.9)
POTASSIUM SERPL-SCNC: 4.2 MMOL/L (ref 3.5–5.1)
PROT SERPL-MCNC: 7.4 G/DL (ref 6–8.4)
RBC # BLD AUTO: 4.79 M/UL (ref 4.6–6.2)
SODIUM SERPL-SCNC: 139 MMOL/L (ref 136–145)
TSH SERPL DL<=0.005 MIU/L-ACNC: 1.5 UIU/ML (ref 0.4–4)
WBC # BLD AUTO: 10.19 K/UL (ref 3.9–12.7)

## 2020-07-08 PROCEDURE — 85025 COMPLETE CBC W/AUTO DIFF WBC: CPT

## 2020-07-08 PROCEDURE — 80053 COMPREHEN METABOLIC PANEL: CPT

## 2020-07-08 PROCEDURE — 36415 COLL VENOUS BLD VENIPUNCTURE: CPT | Mod: PO

## 2020-07-08 PROCEDURE — 84443 ASSAY THYROID STIM HORMONE: CPT

## 2020-07-09 ENCOUNTER — OFFICE VISIT (OUTPATIENT)
Dept: HEMATOLOGY/ONCOLOGY | Facility: CLINIC | Age: 60
End: 2020-07-09
Payer: COMMERCIAL

## 2020-07-09 DIAGNOSIS — C34.02 LUNG CANCER, MAIN BRONCHUS, LEFT: Primary | ICD-10-CM

## 2020-07-09 DIAGNOSIS — C79.51 SECONDARY MALIGNANT NEOPLASM OF BONE: ICD-10-CM

## 2020-07-09 DIAGNOSIS — Z09 CHEMOTHERAPY FOLLOW-UP EXAMINATION: ICD-10-CM

## 2020-07-09 PROCEDURE — 99214 PR OFFICE/OUTPT VISIT, EST, LEVL IV, 30-39 MIN: ICD-10-PCS | Mod: 95,,, | Performed by: INTERNAL MEDICINE

## 2020-07-09 PROCEDURE — 99214 OFFICE O/P EST MOD 30 MIN: CPT | Mod: 95,,, | Performed by: INTERNAL MEDICINE

## 2020-07-09 RX ORDER — HEPARIN 100 UNIT/ML
500 SYRINGE INTRAVENOUS
Status: CANCELLED | OUTPATIENT
Start: 2020-07-10

## 2020-07-09 RX ORDER — SODIUM CHLORIDE 0.9 % (FLUSH) 0.9 %
10 SYRINGE (ML) INJECTION
Status: CANCELLED | OUTPATIENT
Start: 2020-07-10

## 2020-07-09 NOTE — PROGRESS NOTES
Chief Complaint :   Lung cancer    Hx of Present illness :  Patient is a 59 y.o. year old male who presents to the clinic today for   Chemotherapy followup.  Found to have obstructing lesion left main stem bronchus. 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  Started on  on concurrent chemoradiation.  Had two weeks of radiation. Symptomatic relief. Had allergic reaction to taxol week 2.  5 % of tumor cells were positive for PDL - 1.  Started on keytruda monotherapy .     Allergies :    Review of patient's allergies indicates:  No Known Allergies    Occupation :  .     Transfusion :  None    Menstrual & obstetric Hx :  N/A      Present Meds :   Medication List with Changes/Refills   Current Medications    ATENOLOL (TENORMIN) 25 MG TABLET    Take 1 tablet (25 mg total) by mouth once daily.    NICOTINE (NICODERM CQ) 21 MG/24 HR    Place 1 patch onto the skin every 24 hours.    ONDANSETRON (ZOFRAN) 8 MG TABLET    Take 1 tablet (8 mg total) by mouth every 8 (eight) hours as needed for Nausea.       Past Medical Hx :  No past hyx of DM,Hypertension, PUD, Hepatitis, liver disease, thyroid dysfuntion, CVA, seizure disorder. no9 hx of DVT or P)E>     Past Medical Hx :  Past Medical History:   Diagnosis Date    Hypertension     Lung cancer     Lung mass     left lung       Travel Hx :  N/A    Immunization :  Immunization History   Administered Date(s) Administered    Influenza 09/20/2019    Pneumococcal Conjugate - 13 Valent 08/24/2019    Tdap 09/03/2019       Family Hx :  Family History   Problem Relation Age of Onset    Diabetes Mother     Heart defect Mother     Cancer Father     Cancer Sister     No Known Problems Son        Social Hx :  Started smoking  As a teen ager.  SMOKES   ONE ppd.  Social History     Socioeconomic History    Marital status:      Spouse name: Not on file    Number of children: Not on file     Years of education: Not on file    Highest education level: Not on file   Occupational History    Not on file   Social Needs    Financial resource strain: Not very hard    Food insecurity     Worry: Never true     Inability: Never true    Transportation needs     Medical: No     Non-medical: No   Tobacco Use    Smoking status: Former Smoker     Packs/day: 1.00     Years: 25.00     Pack years: 25.00     Types: Cigarettes    Smokeless tobacco: Never Used   Substance and Sexual Activity    Alcohol Use     Frequency: Monthly or less     Drinks per session: 3 or 4     Binge frequency: Less than monthly     Comment: 8/30/19- quit 3 weeks ago    Drug use: Never    Sexual activity: Yes     Partners: Female   Lifestyle    Physical activity     Days per week: 0 days     Minutes per session: 0 min    Stress: To some extent   Relationships    Social connections     Talks on phone: Once a week     Gets together: Once a week     Attends Voodoo service: Not on file     Active member of club or organization: No     Attends meetings of clubs or organizations: Never     Relationship status:    Other Topics Concern    Not on file   Social History Narrative    Not on file       Surgery :  Bronchoscopy. Has not had colonoscopy.    Symptoms :   Good appetite. No weight loss. Has sinus problems.  No fever, chills or cough. No skin rash or pruritis. No sorethroat.  No headache, dizziness, nausea or vomiting. No diarrhoea or constipation. No heartburn. No visual disturbances. No chest pain, palpitation or leg swelling. No abdominal or back pain. No joint pain. No epistaxis, bleeding gums, hematemesis, melena, hematuria or hhemoptysis. Sleep normal. No depression.    Physical Exam :  N/A.    Labs & Imaging : 07/08/20 : Hgb 15.1; Hct 45.5; MCV 95; Platelets 242,000 ANC 5,600.   TSH 1.501. NFBS 173; Cr.0.9; Ca 9.3; Bili 0.2. Liver enzymes normal. eGFR > 60          07/08/20 :  06/16/2020 : TSH 1.907; NFBS 111; Cr.0.9;  eGFR >60. Ca 9.4; Bili 0.2; Liver enzymes normal;  WBC 10,700, ANC 6,100. Platelets 250,000; Hgb 15.1; hct 46.5      05/27/2020 :  WBC 10,140; Hgb 15; hct 40.2; MCV 96; Platelets 264,000; ANC 5,600. TSH 1.438.  NFBS 94; Cr.0.8; eGFR >60. Bili 0.3. Liver enzymes normal.         05/06/2020 : ; Cr.0.3; Ca 9.4; bili 0.3 Liver enzymes normal. eGFR > 60.  TSH 1.264.  Hgb 14.5; Hct 45.3; MCV 91; Platelets 215,000 ANC 8,100.   COVID-- 19 screen Negative.    labs  3/27/20   : TSH normal. Lipid Panel normal.  NFBS 114;  Cr.0.8;  Ca 10.0; bili 0.4 Liver enzymes normal. eGFR > 60.  Hgb 15.3; Hct 46.7; MCV 97 Platelets 213,000 ANC 6,100    02/13/2020 : PET/CT  Decrease in size and metabolic activity Left upper lobe mass. Improvement in Consolidation and atelectasis. Improvement and near resolution of metabolic activity in right sixth and ninth ribs. No new areas of metabolic activity.      Dx : Chemotherapy followup.  Poorly differentiated Squamous Cell carcinoma Left Upper Lobe. Mets to right ribs. Hypertension      Assessment & Plan:  Reviewed with patient.   Cleared for Rx on 07/10/20. .  RTC 3 weeks, with CBC,CMP,TSH. Patient understands and verbalised.        Established Patient - Audio Only Telehealth Visit     The patient location is:  Home  The chief complaint leading to consultation is:  Lung cancer  Visit type: Virtual visit with audio only (telephone)  Total time spent with patient: 15 Mins       The reason for the audio only service rather than synchronous audio and video virtual visit was related to technical difficulties or patient preference/necessity.     Each patient to whom I provide medical services by telemedicine is:  (1) informed of the relationship between the physician and patient and the respective role of any other health care provider with respect to management of the patient; and (2) notified that they may decline to receive medical services by telemedicine and may withdraw from such care at  any time. Patient verbally consented to receive this service via voice-only telephone call.        This service was not originating from a related E/M service provided within the previous 7 days nor will  to an E/M service or procedure within the next 24 hours or my soonest available appointment.  Prevailing standard of care was able to be met in this audio-only visit.

## 2020-07-10 ENCOUNTER — INFUSION (OUTPATIENT)
Dept: INFUSION THERAPY | Facility: HOSPITAL | Age: 60
End: 2020-07-10
Attending: INTERNAL MEDICINE
Payer: COMMERCIAL

## 2020-07-10 VITALS
SYSTOLIC BLOOD PRESSURE: 128 MMHG | BODY MASS INDEX: 35.51 KG/M2 | HEART RATE: 73 BPM | WEIGHT: 251 LBS | RESPIRATION RATE: 17 BRPM | DIASTOLIC BLOOD PRESSURE: 73 MMHG | OXYGEN SATURATION: 95 % | TEMPERATURE: 98 F

## 2020-07-10 DIAGNOSIS — C79.51 SECONDARY MALIGNANT NEOPLASM OF BONE: Primary | ICD-10-CM

## 2020-07-10 DIAGNOSIS — C34.02 LUNG CANCER, MAIN BRONCHUS, LEFT: ICD-10-CM

## 2020-07-10 PROCEDURE — 96413 CHEMO IV INFUSION 1 HR: CPT

## 2020-07-10 PROCEDURE — 25000003 PHARM REV CODE 250: Performed by: INTERNAL MEDICINE

## 2020-07-10 PROCEDURE — 63600175 PHARM REV CODE 636 W HCPCS: Mod: JG | Performed by: INTERNAL MEDICINE

## 2020-07-10 RX ADMIN — SODIUM CHLORIDE 200 MG: 9 INJECTION, SOLUTION INTRAVENOUS at 11:07

## 2020-07-10 NOTE — PLAN OF CARE
Patient arrived to unit for C13 Keytruda. Patient denies any new or worsening symptoms at this time. Plan of care reviewed, patient agreeable to plan. Patient tolerated treatment well. No sign of reaction noted. VSS. Patient received discharge instructions and follow up appointments. Patient instructed to return 7/29/20 for labs, 7/30/20 for Dr. Cruz follow up, and 7/31/20 for chemo. Verbalized understanding and ambulated unassisted off unit. Patient in NAD at time of discharge.

## 2020-07-16 ENCOUNTER — DOCUMENTATION ONLY (OUTPATIENT)
Dept: HEMATOLOGY/ONCOLOGY | Facility: CLINIC | Age: 60
End: 2020-07-16

## 2020-07-29 ENCOUNTER — LAB VISIT (OUTPATIENT)
Dept: LAB | Facility: HOSPITAL | Age: 60
End: 2020-07-29
Attending: INTERNAL MEDICINE
Payer: COMMERCIAL

## 2020-07-29 DIAGNOSIS — Z09 CHEMOTHERAPY FOLLOW-UP EXAMINATION: ICD-10-CM

## 2020-07-29 DIAGNOSIS — C34.02 LUNG CANCER, MAIN BRONCHUS, LEFT: ICD-10-CM

## 2020-07-29 DIAGNOSIS — C79.51 SECONDARY MALIGNANT NEOPLASM OF BONE: ICD-10-CM

## 2020-07-29 LAB
ALBUMIN SERPL BCP-MCNC: 3.9 G/DL (ref 3.5–5.2)
ALP SERPL-CCNC: 66 U/L (ref 55–135)
ALT SERPL W/O P-5'-P-CCNC: 24 U/L (ref 10–44)
ANION GAP SERPL CALC-SCNC: 7 MMOL/L (ref 8–16)
AST SERPL-CCNC: 17 U/L (ref 10–40)
BASOPHILS # BLD AUTO: 0.07 K/UL (ref 0–0.2)
BASOPHILS NFR BLD: 0.6 % (ref 0–1.9)
BILIRUB SERPL-MCNC: 0.4 MG/DL (ref 0.1–1)
BUN SERPL-MCNC: 15 MG/DL (ref 6–20)
CALCIUM SERPL-MCNC: 8.9 MG/DL (ref 8.7–10.5)
CHLORIDE SERPL-SCNC: 103 MMOL/L (ref 95–110)
CO2 SERPL-SCNC: 27 MMOL/L (ref 23–29)
CREAT SERPL-MCNC: 0.9 MG/DL (ref 0.5–1.4)
DIFFERENTIAL METHOD: ABNORMAL
EOSINOPHIL # BLD AUTO: 0.4 K/UL (ref 0–0.5)
EOSINOPHIL NFR BLD: 3.2 % (ref 0–8)
ERYTHROCYTE [DISTWIDTH] IN BLOOD BY AUTOMATED COUNT: 13.2 % (ref 11.5–14.5)
EST. GFR  (AFRICAN AMERICAN): >60 ML/MIN/1.73 M^2
EST. GFR  (NON AFRICAN AMERICAN): >60 ML/MIN/1.73 M^2
GLUCOSE SERPL-MCNC: 122 MG/DL (ref 70–110)
HCT VFR BLD AUTO: 43.7 % (ref 40–54)
HGB BLD-MCNC: 14.3 G/DL (ref 14–18)
IMM GRANULOCYTES # BLD AUTO: 0.04 K/UL (ref 0–0.04)
IMM GRANULOCYTES NFR BLD AUTO: 0.4 % (ref 0–0.5)
LYMPHOCYTES # BLD AUTO: 2.2 K/UL (ref 1–4.8)
LYMPHOCYTES NFR BLD: 20 % (ref 18–48)
MCH RBC QN AUTO: 31 PG (ref 27–31)
MCHC RBC AUTO-ENTMCNC: 32.7 G/DL (ref 32–36)
MCV RBC AUTO: 95 FL (ref 82–98)
MONOCYTES # BLD AUTO: 1.6 K/UL (ref 0.3–1)
MONOCYTES NFR BLD: 14.5 % (ref 4–15)
NEUTROPHILS # BLD AUTO: 6.7 K/UL (ref 1.8–7.7)
NEUTROPHILS NFR BLD: 61.3 % (ref 38–73)
NRBC BLD-RTO: 0 /100 WBC
PLATELET # BLD AUTO: 261 K/UL (ref 150–350)
PMV BLD AUTO: 9.8 FL (ref 9.2–12.9)
POTASSIUM SERPL-SCNC: 4.1 MMOL/L (ref 3.5–5.1)
PROT SERPL-MCNC: 7.3 G/DL (ref 6–8.4)
RBC # BLD AUTO: 4.61 M/UL (ref 4.6–6.2)
SODIUM SERPL-SCNC: 137 MMOL/L (ref 136–145)
TSH SERPL DL<=0.005 MIU/L-ACNC: 0.96 UIU/ML (ref 0.4–4)
WBC # BLD AUTO: 10.86 K/UL (ref 3.9–12.7)

## 2020-07-29 PROCEDURE — 80053 COMPREHEN METABOLIC PANEL: CPT

## 2020-07-29 PROCEDURE — 84443 ASSAY THYROID STIM HORMONE: CPT

## 2020-07-29 PROCEDURE — 85025 COMPLETE CBC W/AUTO DIFF WBC: CPT

## 2020-07-29 PROCEDURE — 36415 COLL VENOUS BLD VENIPUNCTURE: CPT | Mod: PO

## 2020-07-29 NOTE — PROGRESS NOTES
Chief Complaint :   Lung cancer    Hx of Present illness :  Patient is a 59 y.o. year old male who presents to the clinic today for   Chemotherapy followup.  Found to have obstructing lesion left main stem bronchus. 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  Started on  on concurrent chemoradiation.  Had two weeks of radiation. Symptomatic relief. Had allergic reaction to taxol week 2.  5 % of tumor cells were positive for PDL - 1.  Started on keytruda monotherapy . Completed cycle 2.    Allergies :    Review of patient's allergies indicates:  No Known Allergies    Occupation :  .     Transfusion :  None    Menstrual & obstetric Hx :  N/A      Present Meds :   Medication List with Changes/Refills   Current Medications    ATENOLOL (TENORMIN) 25 MG TABLET    Take 1 tablet (25 mg total) by mouth once daily.    NICOTINE (NICODERM CQ) 21 MG/24 HR    Place 1 patch onto the skin every 24 hours.    ONDANSETRON (ZOFRAN) 8 MG TABLET    Take 1 tablet (8 mg total) by mouth every 8 (eight) hours as needed for Nausea.       Past Medical Hx :  No past hyx of DM,Hypertension, PUD, Hepatitis, liver disease, thyroid dysfuntion, CVA, seizure disorder. no9 hx of DVT or P)E>     Past Medical Hx :  Past Medical History:   Diagnosis Date    Hypertension     Lung cancer     Lung mass     left lung       Travel Hx :  N/A    Immunization :  Immunization History   Administered Date(s) Administered    Influenza 09/20/2019    Pneumococcal Conjugate - 13 Valent 08/24/2019    Tdap 09/03/2019       Family Hx :  Family History   Problem Relation Age of Onset    Diabetes Mother     Heart defect Mother     Cancer Father     Cancer Sister     No Known Problems Son        Social Hx :  Started smoking  As a teen ager.  SMOKES   ONE ppd.  Social History     Socioeconomic History    Marital status:      Spouse name: Not on file    Number of children:  Not on file    Years of education: Not on file    Highest education level: Not on file   Occupational History    Not on file   Social Needs    Financial resource strain: Not very hard    Food insecurity     Worry: Never true     Inability: Never true    Transportation needs     Medical: No     Non-medical: No   Tobacco Use    Smoking status: Former Smoker     Packs/day: 1.00     Years: 25.00     Pack years: 25.00     Types: Cigarettes    Smokeless tobacco: Never Used   Substance and Sexual Activity    Alcohol Use     Frequency: Monthly or less     Drinks per session: 3 or 4     Binge frequency: Less than monthly     Comment: 8/30/19- quit 3 weeks ago    Drug use: Never    Sexual activity: Yes     Partners: Female   Lifestyle    Physical activity     Days per week: 0 days     Minutes per session: 0 min    Stress: To some extent   Relationships    Social connections     Talks on phone: Once a week     Gets together: Once a week     Attends Gnosticism service: Not on file     Active member of club or organization: No     Attends meetings of clubs or organizations: Never     Relationship status:    Other Topics Concern    Not on file   Social History Narrative    Not on file       Surgery :  Bronchoscopy. Has not had colonoscopy.    Symptoms :    Review of Systems   Constitutional: Negative for chills, diaphoresis, fever, malaise/fatigue and weight loss.         Quit smoking.Voracious appetite. Continues to gain weight. No p(ain sx   HENT: Positive for congestion. Negative for hearing loss, nosebleeds, sore throat and tinnitus.    Eyes: Negative for blurred vision, double vision and photophobia.   Respiratory: Negative for cough, hemoptysis, sputum production, shortness of breath and wheezing.    Cardiovascular: Negative for chest pain, palpitations, orthopnea, claudication and leg swelling.   Gastrointestinal: Negative for abdominal pain, blood in stool, constipation, diarrhea, heartburn, nausea  and vomiting.   Genitourinary: Negative.  Negative for dysuria, flank pain, frequency, hematuria and urgency.   Musculoskeletal: Positive for back pain. Negative for falls, joint pain, myalgias and neck pain.   Skin: Negative for itching and rash.   Neurological: Negative for dizziness, tingling, tremors, sensory change and headaches.   Endo/Heme/Allergies: Negative for environmental allergies and polydipsia. Does not bruise/bleed easily.   Psychiatric/Behavioral: Negative for depression and memory loss. The patient is not nervous/anxious and does not have insomnia.        Physical Exam :  Wife, sisters and brother in law present in the room.  Physical Exam   Constitutional: He is oriented to person, place, and time and well-developed, well-nourished, and in no distress. Vital signs are normal. No distress.   HENT:   Head: Normocephalic and atraumatic.   Right Ear: External ear normal.   Left Ear: External ear normal.   Nose: Nose normal.   Mouth/Throat: Oropharynx is clear and moist. No oropharyngeal exudate.   Eyes: Pupils are equal, round, and reactive to light. Conjunctivae, EOM and lids are normal. Lids are everted and swept, no foreign bodies found. Right eye exhibits no discharge. Left eye exhibits no discharge.   Neck: Trachea normal, normal range of motion, full passive range of motion without pain and phonation normal. Neck supple. Normal carotid pulses, no hepatojugular reflux and no JVD present. Carotid bruit is not present. No tracheal deviation present. No thyroid mass and no thyromegaly present.   Cardiovascular: Normal rate, regular rhythm, normal heart sounds, intact distal pulses and normal pulses. PMI is not displaced. Exam reveals no gallop and no friction rub.   No murmur heard.  Pulmonary/Chest: Effort normal and breath sounds normal. No stridor. No apnea. No respiratory distress. He has no wheezes. He has no rales. He exhibits no tenderness.   Abdominal: Soft. Normal appearance, normal aorta  and bowel sounds are normal. He exhibits no distension and no mass. There is no hepatosplenomegaly. There is no abdominal tenderness. There is no rebound, no guarding and no CVA tenderness. No hernia.   Musculoskeletal: Normal range of motion.         General: No tenderness, deformity or edema.   Lymphadenopathy:        Head (right side): No submental, no submandibular, no tonsillar, no preauricular, no posterior auricular and no occipital adenopathy present.        Head (left side): No submental, no submandibular, no tonsillar, no preauricular, no posterior auricular and no occipital adenopathy present.     He has no cervical adenopathy.     He has no axillary adenopathy.        Right: No supraclavicular and no epitrochlear adenopathy present.        Left: No supraclavicular and no epitrochlear adenopathy present.   Neurological: He is alert and oriented to person, place, and time. He has normal sensation, normal strength, normal reflexes and intact cranial nerves. No cranial nerve deficit. He exhibits normal muscle tone. Gait normal. Coordination normal. GCS score is 15.   Skin: Skin is warm, dry and intact. No rash noted. He is not diaphoretic. No cyanosis or erythema. No pallor. Nails show no clubbing.   Psychiatric: Mood, memory, affect and judgment normal.   Nursing note and vitals reviewed.    Labs & Imaging :  07/28/20: WBC 10.860; ANC 6,100. Hgb 14.3; Hct 43; MCV 95;   Platelets 261,000; NFBS 132; Cr. 0.9' Ca 8.9; Bili 0.4; liver enzymes normal.             03/06/2020 : TSH normal. Lipid Panel normal.  NFBS 114;  Cr.0.8;  Ca 10.0; bili 0.4 Liver enzymes normal. eGFR > 60.  Hgb 15.3; Hct 46.7; MCV 97 Platelets 213,000 ANC 6,100    02/13/2020 : PET/CT  Decrease in size and metabolic activity Left upper lobe mass. Improvement in Consolidation and atelectasis. Improvement and near resolution of metabolic activity in right sixth and ninth ribs. No new areas of metabolic activity.      Dx : Chemotherapy followup.   Poorly differentiated Squamous Cell carcinoma Left Upper Lobe. Mets to right ribs. Hypertension      Assessment & Plan:  Reviewed with patient.  Cleared for  Keytruda today.. Medical followup with .   RTC 3 weeks with CBC,CMP, TSH, Lipids. Patient  Understands and verbalised.

## 2020-07-30 ENCOUNTER — OFFICE VISIT (OUTPATIENT)
Dept: HEMATOLOGY/ONCOLOGY | Facility: CLINIC | Age: 60
End: 2020-07-30
Payer: COMMERCIAL

## 2020-07-30 VITALS
TEMPERATURE: 97 F | HEIGHT: 70 IN | OXYGEN SATURATION: 95 % | WEIGHT: 253.06 LBS | HEART RATE: 76 BPM | DIASTOLIC BLOOD PRESSURE: 69 MMHG | BODY MASS INDEX: 36.23 KG/M2 | SYSTOLIC BLOOD PRESSURE: 132 MMHG

## 2020-07-30 DIAGNOSIS — Z09 CHEMOTHERAPY FOLLOW-UP EXAMINATION: ICD-10-CM

## 2020-07-30 DIAGNOSIS — C79.51 SECONDARY MALIGNANT NEOPLASM OF BONE: ICD-10-CM

## 2020-07-30 DIAGNOSIS — C34.02 LUNG CANCER, MAIN BRONCHUS, LEFT: Primary | ICD-10-CM

## 2020-07-30 PROCEDURE — 99999 PR PBB SHADOW E&M-EST. PATIENT-LVL III: CPT | Mod: PBBFAC,,, | Performed by: INTERNAL MEDICINE

## 2020-07-30 PROCEDURE — 3008F BODY MASS INDEX DOCD: CPT | Mod: CPTII,S$GLB,, | Performed by: INTERNAL MEDICINE

## 2020-07-30 PROCEDURE — 99999 PR PBB SHADOW E&M-EST. PATIENT-LVL III: ICD-10-PCS | Mod: PBBFAC,,, | Performed by: INTERNAL MEDICINE

## 2020-07-30 PROCEDURE — 99214 OFFICE O/P EST MOD 30 MIN: CPT | Mod: S$GLB,,, | Performed by: INTERNAL MEDICINE

## 2020-07-30 PROCEDURE — 3008F PR BODY MASS INDEX (BMI) DOCUMENTED: ICD-10-PCS | Mod: CPTII,S$GLB,, | Performed by: INTERNAL MEDICINE

## 2020-07-30 PROCEDURE — 99214 PR OFFICE/OUTPT VISIT, EST, LEVL IV, 30-39 MIN: ICD-10-PCS | Mod: S$GLB,,, | Performed by: INTERNAL MEDICINE

## 2020-07-30 RX ORDER — HEPARIN 100 UNIT/ML
500 SYRINGE INTRAVENOUS
Status: CANCELLED | OUTPATIENT
Start: 2020-07-31

## 2020-07-30 RX ORDER — SODIUM CHLORIDE 0.9 % (FLUSH) 0.9 %
10 SYRINGE (ML) INJECTION
Status: CANCELLED | OUTPATIENT
Start: 2020-07-31

## 2020-07-31 ENCOUNTER — DOCUMENTATION ONLY (OUTPATIENT)
Dept: HEMATOLOGY/ONCOLOGY | Facility: CLINIC | Age: 60
End: 2020-07-31

## 2020-07-31 ENCOUNTER — INFUSION (OUTPATIENT)
Dept: INFUSION THERAPY | Facility: HOSPITAL | Age: 60
End: 2020-07-31
Attending: INTERNAL MEDICINE
Payer: COMMERCIAL

## 2020-07-31 VITALS
OXYGEN SATURATION: 97 % | DIASTOLIC BLOOD PRESSURE: 73 MMHG | RESPIRATION RATE: 17 BRPM | TEMPERATURE: 98 F | HEART RATE: 80 BPM | SYSTOLIC BLOOD PRESSURE: 128 MMHG

## 2020-07-31 DIAGNOSIS — C79.51 SECONDARY MALIGNANT NEOPLASM OF BONE: Primary | ICD-10-CM

## 2020-07-31 DIAGNOSIS — C34.02 LUNG CANCER, MAIN BRONCHUS, LEFT: ICD-10-CM

## 2020-07-31 PROCEDURE — 63600175 PHARM REV CODE 636 W HCPCS: Mod: JG | Performed by: INTERNAL MEDICINE

## 2020-07-31 PROCEDURE — 96413 CHEMO IV INFUSION 1 HR: CPT

## 2020-07-31 PROCEDURE — 25000003 PHARM REV CODE 250: Performed by: INTERNAL MEDICINE

## 2020-07-31 RX ADMIN — SODIUM CHLORIDE 200 MG: 9 INJECTION, SOLUTION INTRAVENOUS at 11:07

## 2020-07-31 NOTE — PLAN OF CARE
Patient arrived to unit for C14 Keytruda. Patient denies any new or worsening symptoms at this time. Plan of care reviewed, patient agreeable to plan. Patient tolerated treatment well. No sign of reaction noted. VSS. Patient received discharge instructions and follow up appointments. Patient instructed to return 8/19/20 for labs, 8/20/20 for Dr. Cruz follow up, and 8/21/20 for next infusion. Verbalized understanding and ambulated unassisted off unit. Patient in NAD at time of discharge.

## 2020-08-16 NOTE — PROGRESS NOTES
Chief Complaint :   Lung cancer    Hx of Present illness :  Patient is a 59 y.o. year old male who presents to the clinic today for   Chemotherapy followup.  Found to have obstructing lesion left main stem bronchus. 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 Has mets to right sixth and ninth ribs.  Started on  on concurrent chemoradiation.  Had two weeks of radiation. Symptomatic relief. Had allergic reaction to taxol week 2.  5 % of tumor cells were positive for PDL - 1.  Started on keytruda monotherapy in November 2019    Allergies :    Review of patient's allergies indicates:  No Known Allergies    Occupation :  .     Transfusion :  None    Menstrual & obstetric Hx :  N/A      Present Meds :   Medication List with Changes/Refills   Current Medications    ATENOLOL (TENORMIN) 25 MG TABLET    Take 1 tablet (25 mg total) by mouth once daily.    NICOTINE (NICODERM CQ) 21 MG/24 HR    Place 1 patch onto the skin every 24 hours.    ONDANSETRON (ZOFRAN) 8 MG TABLET    Take 1 tablet (8 mg total) by mouth every 8 (eight) hours as needed for Nausea.       Past Medical Hx :  No past hyx of DM,Hypertension, PUD, Hepatitis, liver disease, thyroid dysfuntion, CVA, seizure disorder. no9 hx of DVT or P)E>     Past Medical Hx :  Past Medical History:   Diagnosis Date    Hypertension     Lung cancer     Lung mass     left lung       Travel Hx :  N/A    Immunization :  Immunization History   Administered Date(s) Administered    Influenza 09/20/2019    Pneumococcal Conjugate - 13 Valent 08/24/2019    Tdap 09/03/2019       Family Hx :  Family History   Problem Relation Age of Onset    Diabetes Mother     Heart defect Mother     Cancer Father     Cancer Sister     No Known Problems Son        Social Hx :  Started smoking  As a teen ager.  SMOKES   ONE ppd.  Social History     Socioeconomic History    Marital status:      Spouse name: Not on file    Number of children: Not  on file    Years of education: Not on file    Highest education level: Not on file   Occupational History    Not on file   Social Needs    Financial resource strain: Not very hard    Food insecurity     Worry: Never true     Inability: Never true    Transportation needs     Medical: No     Non-medical: No   Tobacco Use    Smoking status: Former Smoker     Packs/day: 1.00     Years: 25.00     Pack years: 25.00     Types: Cigarettes    Smokeless tobacco: Never Used   Substance and Sexual Activity    Alcohol Use     Frequency: Monthly or less     Drinks per session: 3 or 4     Binge frequency: Less than monthly     Comment: 8/30/19- quit 3 weeks ago    Drug use: Never    Sexual activity: Yes     Partners: Female   Lifestyle    Physical activity     Days per week: 0 days     Minutes per session: 0 min    Stress: To some extent   Relationships    Social connections     Talks on phone: Once a week     Gets together: Once a week     Attends Congregation service: Not on file     Active member of club or organization: No     Attends meetings of clubs or organizations: Never     Relationship status:    Other Topics Concern    Not on file   Social History Narrative    Not on file       Surgery :  Bronchoscopy. Has not had colonoscopy.    Symptoms :    Review of Systems   Constitutional: Negative for chills, diaphoresis, fever, malaise/fatigue and weight loss.         Quit smoking.Voracious appetite. Continues to gain weight. No p(ain sx   HENT: Positive for congestion. Negative for hearing loss, nosebleeds, sore throat and tinnitus.    Eyes: Negative for blurred vision, double vision and photophobia.   Respiratory: Negative for cough, hemoptysis, sputum production, shortness of breath and wheezing.    Cardiovascular: Negative for chest pain, palpitations, orthopnea, claudication and leg swelling.   Gastrointestinal: Negative for abdominal pain, blood in stool, constipation, diarrhea, heartburn, nausea and  vomiting.   Genitourinary: Negative.  Negative for dysuria, flank pain, frequency, hematuria and urgency.   Musculoskeletal: Positive for back pain. Negative for falls, joint pain, myalgias and neck pain.   Skin: Negative for itching and rash.   Neurological: Negative for dizziness, tingling, tremors, sensory change and headaches.   Endo/Heme/Allergies: Negative for environmental allergies and polydipsia. Does not bruise/bleed easily.   Psychiatric/Behavioral: Negative for depression and memory loss. The patient is not nervous/anxious and does not have insomnia.        Physical Exam :  Wife, present in the room.  Physical Exam   Constitutional: He is oriented to person, place, and time and well-developed, well-nourished, and in no distress. Vital signs are normal. No distress.   HENT:   Head: Normocephalic and atraumatic.   Right Ear: External ear normal.   Left Ear: External ear normal.   Nose: Nose normal.   Mouth/Throat: Oropharynx is clear and moist. No oropharyngeal exudate.   Eyes: Pupils are equal, round, and reactive to light. Conjunctivae, EOM and lids are normal. Lids are everted and swept, no foreign bodies found. Right eye exhibits no discharge. Left eye exhibits no discharge.   Neck: Trachea normal, normal range of motion, full passive range of motion without pain and phonation normal. Neck supple. Normal carotid pulses, no hepatojugular reflux and no JVD present. Carotid bruit is not present. No tracheal deviation present. No thyroid mass and no thyromegaly present.   Cardiovascular: Normal rate, regular rhythm, normal heart sounds, intact distal pulses and normal pulses. PMI is not displaced. Exam reveals no gallop and no friction rub.   No murmur heard.  Pulmonary/Chest: Effort normal and breath sounds normal. No stridor. No apnea. No respiratory distress. He has no wheezes. He has no rales. He exhibits no tenderness.   Abdominal: Soft. Normal appearance, normal aorta and bowel sounds are normal. He  exhibits no distension and no mass. There is no hepatosplenomegaly. There is no abdominal tenderness. There is no rebound, no guarding and no CVA tenderness. No hernia.   Musculoskeletal: Normal range of motion.         General: No tenderness, deformity or edema.   Lymphadenopathy:        Head (right side): No submental, no submandibular, no tonsillar, no preauricular, no posterior auricular and no occipital adenopathy present.        Head (left side): No submental, no submandibular, no tonsillar, no preauricular, no posterior auricular and no occipital adenopathy present.     He has no cervical adenopathy.     He has no axillary adenopathy.        Right: No supraclavicular and no epitrochlear adenopathy present.        Left: No supraclavicular and no epitrochlear adenopathy present.   Neurological: He is alert and oriented to person, place, and time. He has normal sensation, normal strength, normal reflexes and intact cranial nerves. No cranial nerve deficit. He exhibits normal muscle tone. Gait normal. Coordination normal. GCS score is 15.   Skin: Skin is warm, dry and intact. No rash noted. He is not diaphoretic. No cyanosis or erythema. No pallor. Nails show no clubbing.   Psychiatric: Mood, memory, affect and judgment normal.   Nursing note and vitals reviewed.    Labs & Imaging : 08/19/20 : NFBS 124; Cr.0.9; Ca 9.9; Bili 0.5; Liver enzymes normal.  eGFR > 60. Hgb 15.0; Hct 46.9; MCV 93; P_latelets 287,000. ANC 6,000.          07/28/20: WBC 10.860; ANC 6,100. Hgb 14.3; Hct 43; MCV 95;   Platelets 261,000; NFBS 132; Cr. 0.9' Ca 8.9; Bili 0.4; liver enzymes normal.             03/06/2020 : TSH normal. Lipid Panel normal.  NFBS 114;  Cr.0.8;  Ca 10.0; bili 0.4 Liver enzymes normal. eGFR > 60.  Hgb 15.3; Hct 46.7; MCV 97 Platelets 213,000 ANC 6,100    02/13/2020 : PET/CT  Decrease in size and metabolic activity Left upper lobe mass. Improvement in Consolidation and atelectasis. Improvement and near resolution of  metabolic activity in right sixth and ninth ribs. No new areas of metabolic activity.      Dx : Chemotherapy followup.  Poorly differentiated Squamous Cell carcinoma Left Upper Lobe. Mets to right ribs. Hypertension      Assessment & Plan:  Reviewed with patient.  Cleared for  Keytruda on 08/21/20. .. Medical followup with .   RTC 3 weeks with CBC,CMP, TSH, Lipids. PET/CTPatient  Understands and verbalised.

## 2020-08-19 ENCOUNTER — LAB VISIT (OUTPATIENT)
Dept: LAB | Facility: HOSPITAL | Age: 60
End: 2020-08-19
Attending: INTERNAL MEDICINE
Payer: COMMERCIAL

## 2020-08-19 DIAGNOSIS — C79.51 SECONDARY MALIGNANT NEOPLASM OF BONE: ICD-10-CM

## 2020-08-19 DIAGNOSIS — C34.02 LUNG CANCER, MAIN BRONCHUS, LEFT: ICD-10-CM

## 2020-08-19 DIAGNOSIS — Z09 CHEMOTHERAPY FOLLOW-UP EXAMINATION: ICD-10-CM

## 2020-08-19 LAB
ALBUMIN SERPL BCP-MCNC: 4.2 G/DL (ref 3.5–5.2)
ALP SERPL-CCNC: 75 U/L (ref 55–135)
ALT SERPL W/O P-5'-P-CCNC: 20 U/L (ref 10–44)
ANION GAP SERPL CALC-SCNC: 10 MMOL/L (ref 8–16)
AST SERPL-CCNC: 22 U/L (ref 10–40)
BASOPHILS # BLD AUTO: 0.09 K/UL (ref 0–0.2)
BASOPHILS NFR BLD: 0.9 % (ref 0–1.9)
BILIRUB SERPL-MCNC: 0.5 MG/DL (ref 0.1–1)
BUN SERPL-MCNC: 10 MG/DL (ref 6–20)
CALCIUM SERPL-MCNC: 9.9 MG/DL (ref 8.7–10.5)
CHLORIDE SERPL-SCNC: 102 MMOL/L (ref 95–110)
CO2 SERPL-SCNC: 25 MMOL/L (ref 23–29)
CREAT SERPL-MCNC: 1 MG/DL (ref 0.5–1.4)
DIFFERENTIAL METHOD: ABNORMAL
EOSINOPHIL # BLD AUTO: 0.3 K/UL (ref 0–0.5)
EOSINOPHIL NFR BLD: 2.8 % (ref 0–8)
ERYTHROCYTE [DISTWIDTH] IN BLOOD BY AUTOMATED COUNT: 12.9 % (ref 11.5–14.5)
EST. GFR  (AFRICAN AMERICAN): >60 ML/MIN/1.73 M^2
EST. GFR  (NON AFRICAN AMERICAN): >60 ML/MIN/1.73 M^2
GLUCOSE SERPL-MCNC: 124 MG/DL (ref 70–110)
HCT VFR BLD AUTO: 46.9 % (ref 40–54)
HGB BLD-MCNC: 15.5 G/DL (ref 14–18)
IMM GRANULOCYTES # BLD AUTO: 0.06 K/UL (ref 0–0.04)
IMM GRANULOCYTES NFR BLD AUTO: 0.6 % (ref 0–0.5)
LYMPHOCYTES # BLD AUTO: 2.1 K/UL (ref 1–4.8)
LYMPHOCYTES NFR BLD: 20.7 % (ref 18–48)
MCH RBC QN AUTO: 30.7 PG (ref 27–31)
MCHC RBC AUTO-ENTMCNC: 33 G/DL (ref 32–36)
MCV RBC AUTO: 93 FL (ref 82–98)
MONOCYTES # BLD AUTO: 1.5 K/UL (ref 0.3–1)
MONOCYTES NFR BLD: 14.8 % (ref 4–15)
NEUTROPHILS # BLD AUTO: 6 K/UL (ref 1.8–7.7)
NEUTROPHILS NFR BLD: 60.2 % (ref 38–73)
NRBC BLD-RTO: 0 /100 WBC
PLATELET # BLD AUTO: 297 K/UL (ref 150–350)
PMV BLD AUTO: 9.6 FL (ref 9.2–12.9)
POTASSIUM SERPL-SCNC: 4.2 MMOL/L (ref 3.5–5.1)
PROT SERPL-MCNC: 7.9 G/DL (ref 6–8.4)
RBC # BLD AUTO: 5.05 M/UL (ref 4.6–6.2)
SODIUM SERPL-SCNC: 137 MMOL/L (ref 136–145)
TSH SERPL DL<=0.005 MIU/L-ACNC: 1.09 UIU/ML (ref 0.4–4)
WBC # BLD AUTO: 10 K/UL (ref 3.9–12.7)

## 2020-08-19 PROCEDURE — 85025 COMPLETE CBC W/AUTO DIFF WBC: CPT

## 2020-08-19 PROCEDURE — 36415 COLL VENOUS BLD VENIPUNCTURE: CPT | Mod: PO

## 2020-08-19 PROCEDURE — 84443 ASSAY THYROID STIM HORMONE: CPT

## 2020-08-19 PROCEDURE — 80053 COMPREHEN METABOLIC PANEL: CPT

## 2020-08-20 ENCOUNTER — OFFICE VISIT (OUTPATIENT)
Dept: HEMATOLOGY/ONCOLOGY | Facility: CLINIC | Age: 60
End: 2020-08-20
Payer: COMMERCIAL

## 2020-08-20 VITALS
BODY MASS INDEX: 34.78 KG/M2 | HEART RATE: 83 BPM | DIASTOLIC BLOOD PRESSURE: 80 MMHG | WEIGHT: 242.94 LBS | HEIGHT: 70 IN | SYSTOLIC BLOOD PRESSURE: 116 MMHG | OXYGEN SATURATION: 96 % | TEMPERATURE: 99 F

## 2020-08-20 DIAGNOSIS — Z09 CHEMOTHERAPY FOLLOW-UP EXAMINATION: ICD-10-CM

## 2020-08-20 DIAGNOSIS — C79.51 SECONDARY MALIGNANT NEOPLASM OF BONE: ICD-10-CM

## 2020-08-20 DIAGNOSIS — C34.02 LUNG CANCER, MAIN BRONCHUS, LEFT: Primary | ICD-10-CM

## 2020-08-20 PROCEDURE — 3008F BODY MASS INDEX DOCD: CPT | Mod: CPTII,S$GLB,, | Performed by: INTERNAL MEDICINE

## 2020-08-20 PROCEDURE — 99214 OFFICE O/P EST MOD 30 MIN: CPT | Mod: S$GLB,,, | Performed by: INTERNAL MEDICINE

## 2020-08-20 PROCEDURE — 99999 PR PBB SHADOW E&M-EST. PATIENT-LVL III: CPT | Mod: PBBFAC,,, | Performed by: INTERNAL MEDICINE

## 2020-08-20 PROCEDURE — 99214 PR OFFICE/OUTPT VISIT, EST, LEVL IV, 30-39 MIN: ICD-10-PCS | Mod: S$GLB,,, | Performed by: INTERNAL MEDICINE

## 2020-08-20 PROCEDURE — 3008F PR BODY MASS INDEX (BMI) DOCUMENTED: ICD-10-PCS | Mod: CPTII,S$GLB,, | Performed by: INTERNAL MEDICINE

## 2020-08-20 PROCEDURE — 99999 PR PBB SHADOW E&M-EST. PATIENT-LVL III: ICD-10-PCS | Mod: PBBFAC,,, | Performed by: INTERNAL MEDICINE

## 2020-08-20 RX ORDER — SODIUM CHLORIDE 0.9 % (FLUSH) 0.9 %
10 SYRINGE (ML) INJECTION
Status: CANCELLED | OUTPATIENT
Start: 2020-08-21

## 2020-08-20 RX ORDER — HEPARIN 100 UNIT/ML
500 SYRINGE INTRAVENOUS
Status: CANCELLED | OUTPATIENT
Start: 2020-08-21

## 2020-08-21 ENCOUNTER — DOCUMENTATION ONLY (OUTPATIENT)
Dept: HEMATOLOGY/ONCOLOGY | Facility: CLINIC | Age: 60
End: 2020-08-21

## 2020-08-21 ENCOUNTER — INFUSION (OUTPATIENT)
Dept: INFUSION THERAPY | Facility: HOSPITAL | Age: 60
End: 2020-08-21
Attending: INTERNAL MEDICINE
Payer: COMMERCIAL

## 2020-08-21 VITALS
TEMPERATURE: 98 F | DIASTOLIC BLOOD PRESSURE: 70 MMHG | OXYGEN SATURATION: 97 % | RESPIRATION RATE: 7 BRPM | HEART RATE: 85 BPM | SYSTOLIC BLOOD PRESSURE: 134 MMHG

## 2020-08-21 DIAGNOSIS — C79.51 SECONDARY MALIGNANT NEOPLASM OF BONE: Primary | ICD-10-CM

## 2020-08-21 DIAGNOSIS — C34.02 LUNG CANCER, MAIN BRONCHUS, LEFT: ICD-10-CM

## 2020-08-21 PROCEDURE — 63600175 PHARM REV CODE 636 W HCPCS: Mod: JG | Performed by: INTERNAL MEDICINE

## 2020-08-21 PROCEDURE — 25000003 PHARM REV CODE 250: Performed by: INTERNAL MEDICINE

## 2020-08-21 PROCEDURE — 96413 CHEMO IV INFUSION 1 HR: CPT

## 2020-08-21 RX ADMIN — SODIUM CHLORIDE 200 MG: 9 INJECTION, SOLUTION INTRAVENOUS at 11:08

## 2020-08-21 NOTE — PLAN OF CARE
Patient arrived to unit for C15 Keytruda. Patient denies any new or worsening symptoms at this time. Plan of care reviewed, patient agreeable to plan. Patient tolerated treatment well. No sign of reaction noted. VSS. Patient received discharge instructions and follow up appointments. Patient instructed to return 9/9/20 for labs, 9/10/20 for PET scan, and 9/11/20 for Dr. Cruz follow up and next treatment. Verbalized understanding and ambulated unassisted off unit. Patient in NAD at time of discharge.

## 2020-08-25 ENCOUNTER — TELEPHONE (OUTPATIENT)
Dept: FAMILY MEDICINE | Facility: CLINIC | Age: 60
End: 2020-08-25

## 2020-08-25 NOTE — TELEPHONE ENCOUNTER
Spoke with patient, forms has been filled out and faxed back to [employer] and there a copy in the front office for him to .

## 2020-09-09 ENCOUNTER — LAB VISIT (OUTPATIENT)
Dept: LAB | Facility: HOSPITAL | Age: 60
End: 2020-09-09
Attending: INTERNAL MEDICINE
Payer: COMMERCIAL

## 2020-09-09 DIAGNOSIS — Z09 CHEMOTHERAPY FOLLOW-UP EXAMINATION: ICD-10-CM

## 2020-09-09 DIAGNOSIS — C34.02 LUNG CANCER, MAIN BRONCHUS, LEFT: ICD-10-CM

## 2020-09-09 DIAGNOSIS — C79.51 SECONDARY MALIGNANT NEOPLASM OF BONE: ICD-10-CM

## 2020-09-09 LAB
ALBUMIN SERPL BCP-MCNC: 4.1 G/DL (ref 3.5–5.2)
ALP SERPL-CCNC: 76 U/L (ref 55–135)
ALT SERPL W/O P-5'-P-CCNC: 24 U/L (ref 10–44)
ANION GAP SERPL CALC-SCNC: 8 MMOL/L (ref 8–16)
AST SERPL-CCNC: 18 U/L (ref 10–40)
BASOPHILS # BLD AUTO: 0.09 K/UL (ref 0–0.2)
BASOPHILS NFR BLD: 1 % (ref 0–1.9)
BILIRUB SERPL-MCNC: 0.4 MG/DL (ref 0.1–1)
BUN SERPL-MCNC: 10 MG/DL (ref 6–20)
CALCIUM SERPL-MCNC: 9.4 MG/DL (ref 8.7–10.5)
CHLORIDE SERPL-SCNC: 104 MMOL/L (ref 95–110)
CHOLEST SERPL-MCNC: 183 MG/DL (ref 120–199)
CHOLEST/HDLC SERPL: 5.5 {RATIO} (ref 2–5)
CO2 SERPL-SCNC: 28 MMOL/L (ref 23–29)
CREAT SERPL-MCNC: 0.8 MG/DL (ref 0.5–1.4)
DIFFERENTIAL METHOD: NORMAL
EOSINOPHIL # BLD AUTO: 0.4 K/UL (ref 0–0.5)
EOSINOPHIL NFR BLD: 4 % (ref 0–8)
ERYTHROCYTE [DISTWIDTH] IN BLOOD BY AUTOMATED COUNT: 12.8 % (ref 11.5–14.5)
EST. GFR  (AFRICAN AMERICAN): >60 ML/MIN/1.73 M^2
EST. GFR  (NON AFRICAN AMERICAN): >60 ML/MIN/1.73 M^2
GLUCOSE SERPL-MCNC: 118 MG/DL (ref 70–110)
HCT VFR BLD AUTO: 44.2 % (ref 40–54)
HDLC SERPL-MCNC: 33 MG/DL (ref 40–75)
HDLC SERPL: 18 % (ref 20–50)
HGB BLD-MCNC: 14.5 G/DL (ref 14–18)
IMM GRANULOCYTES # BLD AUTO: 0.03 K/UL (ref 0–0.04)
IMM GRANULOCYTES NFR BLD AUTO: 0.3 % (ref 0–0.5)
LDLC SERPL CALC-MCNC: 132.6 MG/DL (ref 63–159)
LYMPHOCYTES # BLD AUTO: 2.2 K/UL (ref 1–4.8)
LYMPHOCYTES NFR BLD: 24.5 % (ref 18–48)
MCH RBC QN AUTO: 30.3 PG (ref 27–31)
MCHC RBC AUTO-ENTMCNC: 32.8 G/DL (ref 32–36)
MCV RBC AUTO: 92 FL (ref 82–98)
MONOCYTES # BLD AUTO: 1 K/UL (ref 0.3–1)
MONOCYTES NFR BLD: 10.7 % (ref 4–15)
NEUTROPHILS # BLD AUTO: 5.3 K/UL (ref 1.8–7.7)
NEUTROPHILS NFR BLD: 59.5 % (ref 38–73)
NONHDLC SERPL-MCNC: 150 MG/DL
NRBC BLD-RTO: 0 /100 WBC
PLATELET # BLD AUTO: 236 K/UL (ref 150–350)
PMV BLD AUTO: 10.1 FL (ref 9.2–12.9)
POTASSIUM SERPL-SCNC: 3.9 MMOL/L (ref 3.5–5.1)
PROT SERPL-MCNC: 7.5 G/DL (ref 6–8.4)
RBC # BLD AUTO: 4.79 M/UL (ref 4.6–6.2)
SODIUM SERPL-SCNC: 140 MMOL/L (ref 136–145)
TRIGL SERPL-MCNC: 87 MG/DL (ref 30–150)
TSH SERPL DL<=0.005 MIU/L-ACNC: 0.83 UIU/ML (ref 0.4–4)
WBC # BLD AUTO: 8.86 K/UL (ref 3.9–12.7)

## 2020-09-09 PROCEDURE — 36415 COLL VENOUS BLD VENIPUNCTURE: CPT | Mod: PO

## 2020-09-09 PROCEDURE — 80053 COMPREHEN METABOLIC PANEL: CPT

## 2020-09-09 PROCEDURE — 84443 ASSAY THYROID STIM HORMONE: CPT

## 2020-09-09 PROCEDURE — 85025 COMPLETE CBC W/AUTO DIFF WBC: CPT

## 2020-09-09 PROCEDURE — 80061 LIPID PANEL: CPT

## 2020-09-09 NOTE — PROGRESS NOTES
Chief Complaint :   Lung cancer    Hx of Present illness :  Patient is a 59 y.o. year old male who presents to the clinic today for   Chemotherapy followup.  Found to have obstructing lesion left main stem bronchus. 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 Has mets to right sixth and ninth ribs.  Started on  on concurrent chemoradiation.  Had two weeks of radiation. Symptomatic relief. Had allergic reaction to taxol week 2.  5 % of tumor cells were positive for PDL - 1.  Started on keytruda monotherapy in November 2019    Allergies :    Review of patient's allergies indicates:  No Known Allergies    Occupation :  .     Transfusion :  None    Menstrual & obstetric Hx :  N/A      Present Meds :   Medication List with Changes/Refills   Current Medications    ATENOLOL (TENORMIN) 25 MG TABLET    Take 1 tablet (25 mg total) by mouth once daily.       Past Medical Hx :  No past hyx of DM,Hypertension, PUD, Hepatitis, liver disease, thyroid dysfuntion, CVA, seizure disorder. no9 hx of DVT or P)E>     Past Medical Hx :  Past Medical History:   Diagnosis Date    Hypertension     Lung cancer     Lung mass     left lung       Travel Hx :  N/A    Immunization :  Immunization History   Administered Date(s) Administered    Influenza 09/20/2019    Pneumococcal Conjugate - 13 Valent 08/24/2019    Tdap 09/03/2019       Family Hx :  Family History   Problem Relation Age of Onset    Diabetes Mother     Heart defect Mother     Cancer Father     Cancer Sister     No Known Problems Son        Social Hx :  Started smoking  As a teen ager.  SMOKES   ONE ppd.  Social History     Socioeconomic History    Marital status:      Spouse name: Not on file    Number of children: Not on file    Years of education: Not on file    Highest education level: Not on file   Occupational History    Not on file   Social Needs    Financial resource strain: Not very hard    Food insecurity      Worry: Never true     Inability: Never true    Transportation needs     Medical: No     Non-medical: No   Tobacco Use    Smoking status: Former Smoker     Packs/day: 1.00     Years: 25.00     Pack years: 25.00     Types: Cigarettes    Smokeless tobacco: Never Used   Substance and Sexual Activity    Alcohol Use     Frequency: Monthly or less     Drinks per session: 3 or 4     Binge frequency: Less than monthly     Comment: 8/30/19- quit 3 weeks ago    Drug use: Never    Sexual activity: Yes     Partners: Female   Lifestyle    Physical activity     Days per week: 0 days     Minutes per session: 0 min    Stress: To some extent   Relationships    Social connections     Talks on phone: Once a week     Gets together: Once a week     Attends Yazidi service: Not on file     Active member of club or organization: No     Attends meetings of clubs or organizations: Never     Relationship status:    Other Topics Concern    Not on file   Social History Narrative    Not on file       Surgery :  Bronchoscopy. Has not had colonoscopy.    Symptoms :    Review of Systems   Constitutional: Negative for chills, diaphoresis, fever, malaise/fatigue and weight loss.         Quit smoking.Voracious appetite. Continues to gain weight. No p(ain sx   HENT: Positive for congestion. Negative for hearing loss, nosebleeds, sore throat and tinnitus.    Eyes: Negative for blurred vision, double vision and photophobia.   Respiratory: Negative for cough, hemoptysis, sputum production, shortness of breath and wheezing.    Cardiovascular: Negative for chest pain, palpitations, orthopnea, claudication and leg swelling.   Gastrointestinal: Negative for abdominal pain, blood in stool, constipation, diarrhea, heartburn, nausea and vomiting.   Genitourinary: Negative.  Negative for dysuria, flank pain, frequency, hematuria and urgency.   Musculoskeletal: Positive for back pain. Negative for falls, joint pain, myalgias and neck pain.    Skin: Negative for itching and rash.   Neurological: Negative for dizziness, tingling, tremors, sensory change and headaches.   Endo/Heme/Allergies: Negative for environmental allergies and polydipsia. Does not bruise/bleed easily.   Psychiatric/Behavioral: Negative for depression and memory loss. The patient is not nervous/anxious and does not have insomnia.        Physical Exam :  Wife, present in the room.  Physical Exam   Constitutional: He is oriented to person, place, and time and well-developed, well-nourished, and in no distress. Vital signs are normal. No distress.   HENT:   Head: Normocephalic and atraumatic.   Right Ear: External ear normal.   Left Ear: External ear normal.   Nose: Nose normal.   Mouth/Throat: Oropharynx is clear and moist. No oropharyngeal exudate.   Eyes: Pupils are equal, round, and reactive to light. Conjunctivae, EOM and lids are normal. Lids are everted and swept, no foreign bodies found. Right eye exhibits no discharge. Left eye exhibits no discharge.   Neck: Trachea normal, normal range of motion, full passive range of motion without pain and phonation normal. Neck supple. Normal carotid pulses, no hepatojugular reflux and no JVD present. Carotid bruit is not present. No tracheal deviation present. No thyroid mass and no thyromegaly present.   Cardiovascular: Normal rate, regular rhythm, normal heart sounds, intact distal pulses and normal pulses. PMI is not displaced. Exam reveals no gallop and no friction rub.   No murmur heard.  Pulmonary/Chest: Effort normal and breath sounds normal. No stridor. No apnea. No respiratory distress. He has no wheezes. He has no rales. He exhibits no tenderness.   Abdominal: Soft. Normal appearance, normal aorta and bowel sounds are normal. He exhibits no distension and no mass. There is no hepatosplenomegaly. There is no abdominal tenderness. There is no rebound, no guarding and no CVA tenderness. No hernia.   Musculoskeletal: Normal range of  motion.         General: No tenderness, deformity or edema.   Lymphadenopathy:        Head (right side): No submental, no submandibular, no tonsillar, no preauricular, no posterior auricular and no occipital adenopathy present.        Head (left side): No submental, no submandibular, no tonsillar, no preauricular, no posterior auricular and no occipital adenopathy present.     He has no cervical adenopathy.     He has no axillary adenopathy.        Right: No supraclavicular and no epitrochlear adenopathy present.        Left: No supraclavicular and no epitrochlear adenopathy present.   Neurological: He is alert and oriented to person, place, and time. He has normal sensation, normal strength, normal reflexes and intact cranial nerves. No cranial nerve deficit. He exhibits normal muscle tone. Gait normal. Coordination normal. GCS score is 15.   Skin: Skin is warm, dry and intact. No rash noted. He is not diaphoretic. No cyanosis or erythema. No pallor. Nails show no clubbing.   Psychiatric: Mood, memory, affect and judgment normal.   Nursing note and vitals reviewed.    Labs & Imaging : 9/10/2020 : PET/CT : Interval progression og activity in Left upper lobe mass;  New hypermetabolic Cervical and Left Hilar Lymph nodes  09/09/2020 : Hgb 14.5; Hct 44.2; MCV 92; Platelets 236,000. ANC 5,300.  NFBS 118; Cr,0.8; eGFR > 60. Ca 9.4; Bili 0.4; Liver enzymes normal.         08/19/20 : NFBS 124; Cr.0.9; Ca 9.9; Bili 0.5; Liver enzymes normal.  eGFR > 60. Hgb 15.0; Hct 46.9; MCV 93; P_latelets 287,000. ANC 6,000.          07/28/20: WBC 10.860; ANC 6,100. Hgb 14.3; Hct 43; MCV 95;   Platelets 261,000; NFBS 132; Cr. 0.9' Ca 8.9; Bili 0.4; liver enzymes normal.             03/06/2020 : TSH normal. Lipid Panel normal.  NFBS 114;  Cr.0.8;  Ca 10.0; bili 0.4 Liver enzymes normal. eGFR > 60.  Hgb 15.3; Hct 46.7; MCV 97 Platelets 213,000 ANC 6,100    02/13/2020 : PET/CT  Decrease in size and metabolic activity Left upper lobe mass.  Improvement in Consolidation and atelectasis. Improvement and near resolution of metabolic activity in right sixth and ninth ribs. No new areas of metabolic activity.      Dx : Chemotherapy followup.  Poorly differentiated Squamous Cell carcinoma Left Upper Lobe. Mets to right ribs. Hypertension      Assessment & Plan:  Reviewed with patient and spouse. Unfortunate turn of events..  I am not sure if he had reaction to Taxotere. Will check at infusion. Also Check for Clinical trial and call patient. Understands and verbalised.

## 2020-09-10 ENCOUNTER — HOSPITAL ENCOUNTER (OUTPATIENT)
Dept: RADIOLOGY | Facility: HOSPITAL | Age: 60
Discharge: HOME | End: 2020-09-10
Attending: INTERNAL MEDICINE
Payer: COMMERCIAL

## 2020-09-10 DIAGNOSIS — C79.51 SECONDARY MALIGNANT NEOPLASM OF BONE: ICD-10-CM

## 2020-09-10 DIAGNOSIS — C34.02 LUNG CANCER, MAIN BRONCHUS, LEFT: ICD-10-CM

## 2020-09-10 DIAGNOSIS — Z09 CHEMOTHERAPY FOLLOW-UP EXAMINATION: ICD-10-CM

## 2020-09-10 PROCEDURE — 78815 NM PET CT ROUTINE: ICD-10-PCS | Mod: 26,PS,, | Performed by: RADIOLOGY

## 2020-09-10 PROCEDURE — 78815 PET IMAGE W/CT SKULL-THIGH: CPT | Mod: 26,PS,, | Performed by: RADIOLOGY

## 2020-09-10 PROCEDURE — 78815 PET IMAGE W/CT SKULL-THIGH: CPT | Mod: TC

## 2020-09-10 PROCEDURE — A9552 F18 FDG: HCPCS

## 2020-09-11 ENCOUNTER — OFFICE VISIT (OUTPATIENT)
Dept: HEMATOLOGY/ONCOLOGY | Facility: CLINIC | Age: 60
End: 2020-09-11
Payer: COMMERCIAL

## 2020-09-11 VITALS
OXYGEN SATURATION: 97 % | DIASTOLIC BLOOD PRESSURE: 72 MMHG | SYSTOLIC BLOOD PRESSURE: 140 MMHG | TEMPERATURE: 99 F | WEIGHT: 243.19 LBS | HEIGHT: 70 IN | HEART RATE: 79 BPM | BODY MASS INDEX: 34.82 KG/M2

## 2020-09-11 DIAGNOSIS — C34.02 LUNG CANCER, MAIN BRONCHUS, LEFT: Primary | ICD-10-CM

## 2020-09-11 DIAGNOSIS — C79.51 SECONDARY MALIGNANT NEOPLASM OF BONE: ICD-10-CM

## 2020-09-11 PROCEDURE — 99213 OFFICE O/P EST LOW 20 MIN: CPT | Mod: S$GLB,,, | Performed by: INTERNAL MEDICINE

## 2020-09-11 PROCEDURE — 3008F BODY MASS INDEX DOCD: CPT | Mod: CPTII,S$GLB,, | Performed by: INTERNAL MEDICINE

## 2020-09-11 PROCEDURE — 3008F PR BODY MASS INDEX (BMI) DOCUMENTED: ICD-10-PCS | Mod: CPTII,S$GLB,, | Performed by: INTERNAL MEDICINE

## 2020-09-11 PROCEDURE — 99213 PR OFFICE/OUTPT VISIT, EST, LEVL III, 20-29 MIN: ICD-10-PCS | Mod: S$GLB,,, | Performed by: INTERNAL MEDICINE

## 2020-09-11 PROCEDURE — 99999 PR PBB SHADOW E&M-EST. PATIENT-LVL III: ICD-10-PCS | Mod: PBBFAC,,, | Performed by: INTERNAL MEDICINE

## 2020-09-11 PROCEDURE — 99999 PR PBB SHADOW E&M-EST. PATIENT-LVL III: CPT | Mod: PBBFAC,,, | Performed by: INTERNAL MEDICINE

## 2020-09-16 ENCOUNTER — TELEPHONE (OUTPATIENT)
Dept: HEMATOLOGY/ONCOLOGY | Facility: CLINIC | Age: 60
End: 2020-09-16

## 2020-09-16 NOTE — TELEPHONE ENCOUNTER
Notified patient's wife about evaluation for clinical trial. He will be contacted by trial co ordinator.

## 2020-09-17 ENCOUNTER — TUMOR BOARD CONFERENCE (OUTPATIENT)
Dept: HEMATOLOGY/ONCOLOGY | Facility: CLINIC | Age: 60
End: 2020-09-17

## 2020-09-17 NOTE — PROGRESS NOTES
[facility] Tumor Board    Date: 9/16/2020    Patient Name: Kashif Iverson     MRN: [id number]    Diagnosis: Metastatic Squamous Cell Carcinoma    Referring Provider: Dr Cricket Interiano    Present PCTP Providers:    Dr. Lux Golden, MSN, APRN, FNP-C    Patient Summary:  Diagnosed in August 2019  Initial concurrent chemoradiation (Carboplatin/Taxol). Taxol stopped after 3 doses d/t allergic reaction  Has been on Keytruda after radiation since Oct 2019    Molecular Workup:  5 % of tumor cells positive for PDL-1    Board Recommendations:   (Abbey): cant find EGFR or ALK mutation testing, so cant say yes or no  LungMap (Enma): if tissue available. MD notes document h/o HTN, liver dx, Hepatitis, thyroid d/o, CVA and seizure d/o, all controlled? May be an issue for inclusion into sub-studies.  Strata: hold in case tissue needed for other trials   Tayisa to schedule consult.

## 2020-09-18 ENCOUNTER — TELEPHONE (OUTPATIENT)
Dept: HEMATOLOGY/ONCOLOGY | Facility: CLINIC | Age: 60
End: 2020-09-18

## 2020-09-21 ENCOUNTER — LAB VISIT (OUTPATIENT)
Dept: LAB | Facility: HOSPITAL | Age: 60
End: 2020-09-21
Attending: INTERNAL MEDICINE
Payer: COMMERCIAL

## 2020-09-21 ENCOUNTER — OFFICE VISIT (OUTPATIENT)
Dept: HEMATOLOGY/ONCOLOGY | Facility: CLINIC | Age: 60
End: 2020-09-21
Payer: COMMERCIAL

## 2020-09-21 VITALS
DIASTOLIC BLOOD PRESSURE: 86 MMHG | BODY MASS INDEX: 35.07 KG/M2 | TEMPERATURE: 98 F | RESPIRATION RATE: 20 BRPM | HEIGHT: 70 IN | WEIGHT: 244.94 LBS | HEART RATE: 77 BPM | SYSTOLIC BLOOD PRESSURE: 153 MMHG | OXYGEN SATURATION: 91 %

## 2020-09-21 DIAGNOSIS — C79.51 SECONDARY MALIGNANT NEOPLASM OF BONE: ICD-10-CM

## 2020-09-21 DIAGNOSIS — C34.02 LUNG CANCER, MAIN BRONCHUS, LEFT: Primary | ICD-10-CM

## 2020-09-21 DIAGNOSIS — D63.8 ANEMIA, CHRONIC DISEASE: ICD-10-CM

## 2020-09-21 DIAGNOSIS — C34.02 LUNG CANCER, MAIN BRONCHUS, LEFT: ICD-10-CM

## 2020-09-21 PROBLEM — Z09 CHEMOTHERAPY FOLLOW-UP EXAMINATION: Status: RESOLVED | Noted: 2020-06-18 | Resolved: 2020-09-21

## 2020-09-21 PROCEDURE — 99999 PR PBB SHADOW E&M-EST. PATIENT-LVL III: ICD-10-PCS | Mod: PBBFAC,,, | Performed by: INTERNAL MEDICINE

## 2020-09-21 PROCEDURE — 99215 OFFICE O/P EST HI 40 MIN: CPT | Mod: S$GLB,,, | Performed by: INTERNAL MEDICINE

## 2020-09-21 PROCEDURE — 36415 COLL VENOUS BLD VENIPUNCTURE: CPT

## 2020-09-21 PROCEDURE — 3008F BODY MASS INDEX DOCD: CPT | Mod: CPTII,S$GLB,, | Performed by: INTERNAL MEDICINE

## 2020-09-21 PROCEDURE — 3008F PR BODY MASS INDEX (BMI) DOCUMENTED: ICD-10-PCS | Mod: CPTII,S$GLB,, | Performed by: INTERNAL MEDICINE

## 2020-09-21 PROCEDURE — 99999 PR PBB SHADOW E&M-EST. PATIENT-LVL III: CPT | Mod: PBBFAC,,, | Performed by: INTERNAL MEDICINE

## 2020-09-21 PROCEDURE — 99215 PR OFFICE/OUTPT VISIT, EST, LEVL V, 40-54 MIN: ICD-10-PCS | Mod: S$GLB,,, | Performed by: INTERNAL MEDICINE

## 2020-09-21 NOTE — LETTER
September 21, 2020      Cricket Interiano MD  [address] [facility] [address]  [address]  [place] [address]           [facility] 3rd [facility]  [address]  [facility] [address]  Phone: [phone]  Fax: [phone]          Patient: Kashif Iverson   MR Number: [id number]   YOB: 1960   Date of Visit: 9/21/2020       Dear Dr. Cricket Interiano:    Thank you for referring Kashif Iverson to me for evaluation. Attached you will find relevant portions of my assessment and plan of care.    If you have questions, please do not hesitate to call me. I look forward to following Kashif Iverson along with you.    Sincerely,    Jevon Key MD    Enclosure  CC:  No Recipients    If you would like to receive this communication electronically, please contact [email or web address] or [phone] to request more information on [employer] Link access.    For providers and/or their staff who would like to refer a patient to [facility], please contact us through our one-stop-shop provider referral line, [facility], at [phone].    If you feel you have received this communication in error or would no longer like to receive these types of communications, please e-mail [email or web address]

## 2020-09-21 NOTE — PROGRESS NOTES
PATIENT: Kashif Iverson  MRN: [id number]  DATE: 9/21/2020      Diagnosis:   1. Lung cancer, main bronchus, left    2. Secondary malignant neoplasm of bone    3. Anemia, chronic disease        Chief Complaint: Lung Cancer    Subjective:   Kashif Iverson is a 59 y.o. male with HTN, CVA, previous smoker (40 pack years, quit ~May 2020), stage IV squamous cell cancer of the lung, who presents for evaluation of clinical trial candidacy.    Oncologic History  · Diagnosed in August 2019, PET showed left lung mass and metastatic rib lesions, brain MRI negative, biopsy with 5 % of tumor cells positive for PDL-1  · Initial concurrent chemoradiation (Carboplatin/Taxol) until 10/29/2019. Taxol stopped after 3 doses d/t allergic reaction - patient reports dyspnea, hallucinations, facial flushing on 2 occasions. Then after reaction patient was switched to single-agent Keytruda (not d/t progression)  · Has been on Keytruda after radiation since Nov 2019, stopped 9/10/2020 PET/CT showed: Interval progression og activity in Left upper lobe mass; New hypermetabolic Cervical and Left Hilar Lymph nodes     PCTP Board Recommendations:  ·  (Abbey): cant find EGFR or ALK mutation testing, so cant say yes or no  · LungMap (Enma): if tissue available. MD notes document h/o HTN, liver dx, Hepatitis, thyroid d/o, CVA and seizure d/o, all controlled? May be an issue for inclusion into sub-studies.  · Strata: hold in case tissue needed for other trials     Patient seen today with his wife, feeling well. States he feels the best he has felt in years. He denies dyspnea, chest pain, cough. No appetite or weight loss. Appetite excellent and has been gaining weight. ECOG 0. Denies history of seizures, or liver disease.    Past Medical History:   Past Medical History:   Diagnosis Date    Hypertension     Lung cancer     Lung mass     left lung       Past Surgical HIstory:   Past Surgical History:   Procedure Laterality Date    BRONCHOSCOPY N/A  8/30/2019    Procedure: Bronchoscopy;  Surgeon: Miguel Diagnostic Provider;  Location: [facility] OR [address] [place] [address];  Service: Anesthesiology;  Laterality: N/A;    SPLENECTOMY, TOTAL         Family History:   Family History   Problem Relation Age of Onset    Diabetes Mother     Heart defect Mother     Cancer Father     Cancer Sister     No Known Problems Son        Social History:  reports that he quit smoking about 8 months ago. His smoking use included cigarettes. He has a 25.00 pack-year smoking history. He has never used smokeless tobacco. He reports current alcohol use. He reports that he does not use drugs. Previously worked as [employer], quit at time of lung CA diagnosis in Aug 2019.    Allergies:  Review of patient's allergies indicates:  No Known Allergies    Medications:  Current Outpatient Medications   Medication Sig Dispense Refill    ascorbic acid-multivit-min (VITAMIN C ENERGY BOOSTER) 1,000 mg PwEP Take by mouth. Patient takes 3,000 mg per day      atenoloL (TENORMIN) 25 MG tablet Take 1 tablet (25 mg total) by mouth once daily. 30 tablet 11     No current facility-administered medications for this visit.        Review of Systems   Constitutional: Negative for appetite change, chills, fever and unexpected weight change.   HENT: Negative for mouth sores, nosebleeds and sore throat.    Eyes: Negative for visual disturbance.   Respiratory: Negative for cough and shortness of breath.    Cardiovascular: Negative for chest pain and leg swelling.   Gastrointestinal: Negative for abdominal pain, blood in stool, diarrhea, nausea and vomiting.   Genitourinary: Negative for dysuria, frequency and hematuria.   Musculoskeletal: Negative for arthralgias and back pain.   Skin: Negative for rash.   Neurological: Negative for light-headedness and headaches.   Hematological: Negative for adenopathy. Does not bruise/bleed easily.   Psychiatric/Behavioral: The patient is not nervous/anxious.        ECOG Performance  "Status: 0   Objective:      Vitals:   Vitals:    09/21/20 0851   BP: (!) 153/86   Pulse: 77   Resp: 20   Temp: 98.4 °F (36.9 °C)   TempSrc: Oral   SpO2: (!) 91%   Weight: 111.1 kg (244 lb 14.9 oz)   Height: 5' 10" (1.778 m)       Physical Exam  Constitutional:       Appearance: He is well-developed.   HENT:      Head: Normocephalic and atraumatic.   Eyes:      General: No scleral icterus.     Pupils: Pupils are equal, round, and reactive to light.   Neck:      Musculoskeletal: Neck supple.   Cardiovascular:      Rate and Rhythm: Normal rate and regular rhythm.   Pulmonary:      Effort: Pulmonary effort is normal. No respiratory distress.      Breath sounds: Normal breath sounds.   Abdominal:      General: There is no distension.      Palpations: Abdomen is soft.      Tenderness: There is no abdominal tenderness.   Musculoskeletal: Normal range of motion.   Lymphadenopathy:      Cervical: No cervical adenopathy.   Skin:     General: Skin is warm and dry.   Neurological:      Mental Status: He is alert.   Psychiatric:         Behavior: Behavior normal.         Laboratory Data:  No visits with results within 1 Week(s) from this visit.   Latest known visit with results is:   Lab Visit on 09/09/2020   Component Date Value Ref Range Status    WBC 09/09/2020 8.86  3.90 - 12.70 K/uL Final    RBC 09/09/2020 4.79  4.60 - 6.20 M/uL Final    Hemoglobin 09/09/2020 14.5  14.0 - 18.0 g/dL Final    Hematocrit 09/09/2020 44.2  40.0 - 54.0 % Final    Mean Corpuscular Volume 09/09/2020 92  82 - 98 fL Final    Mean Corpuscular Hemoglobin 09/09/2020 30.3  27.0 - 31.0 pg Final    Mean Corpuscular Hemoglobin Conc 09/09/2020 32.8  32.0 - 36.0 g/dL Final    RDW 09/09/2020 12.8  11.5 - 14.5 % Final    Platelets 09/09/2020 236  150 - 350 K/uL Final    MPV 09/09/2020 10.1  9.2 - 12.9 fL Final    Immature Granulocytes 09/09/2020 0.3  0.0 - 0.5 % Final    Gran # (ANC) 09/09/2020 5.3  1.8 - 7.7 K/uL Final    Immature Grans (Abs) " 09/09/2020 0.03  0.00 - 0.04 K/uL Final    Comment: Mild elevation in immature granulocytes is non specific and   can be seen in a variety of conditions including stress response,   acute inflammation, trauma and pregnancy. Correlation with other   laboratory and clinical findings is essential.      Lymph # 09/09/2020 2.2  1.0 - 4.8 K/uL Final    Mono # 09/09/2020 1.0  0.3 - 1.0 K/uL Final    Eos # 09/09/2020 0.4  0.0 - 0.5 K/uL Final    Baso # 09/09/2020 0.09  0.00 - 0.20 K/uL Final    nRBC 09/09/2020 0  0 /100 WBC Final    Gran% 09/09/2020 59.5  38.0 - 73.0 % Final    Lymph% 09/09/2020 24.5  18.0 - 48.0 % Final    Mono% 09/09/2020 10.7  4.0 - 15.0 % Final    Eosinophil% 09/09/2020 4.0  0.0 - 8.0 % Final    Basophil% 09/09/2020 1.0  0.0 - 1.9 % Final    Differential Method 09/09/2020 Automated   Final    Sodium 09/09/2020 140  136 - 145 mmol/L Final    Potassium 09/09/2020 3.9  3.5 - 5.1 mmol/L Final    Chloride 09/09/2020 104  95 - 110 mmol/L Final    CO2 09/09/2020 28  23 - 29 mmol/L Final    Glucose 09/09/2020 118* 70 - 110 mg/dL Final    BUN, Bld 09/09/2020 10  6 - 20 mg/dL Final    Creatinine 09/09/2020 0.8  0.5 - 1.4 mg/dL Final    Calcium 09/09/2020 9.4  8.7 - 10.5 mg/dL Final    Total Protein 09/09/2020 7.5  6.0 - 8.4 g/dL Final    Albumin 09/09/2020 4.1  3.5 - 5.2 g/dL Final    Total Bilirubin 09/09/2020 0.4  0.1 - 1.0 mg/dL Final    Comment: For infants and newborns, interpretation of results should be based  on gestational age, weight and in agreement with clinical  observations.  Premature Infant recommended reference ranges:  Up to 24 hours.............<8.0 mg/dL  Up to 48 hours............<12.0 mg/dL  3-5 days..................<15.0 mg/dL  6-29 days.................<15.0 mg/dL      Alkaline Phosphatase 09/09/2020 76  55 - 135 U/L Final    AST 09/09/2020 18  10 - 40 U/L Final    ALT 09/09/2020 24  10 - 44 U/L Final    Anion Gap 09/09/2020 8  8 - 16 mmol/L Final    eGFR if   09/09/2020 >60  >60 mL/min/1.73 m^2 Final    eGFR if non African American 09/09/2020 >60  >60 mL/min/1.73 m^2 Final    Comment: Calculation used to obtain the estimated glomerular filtration  rate (eGFR) is the CKD-EPI equation.       TSH 09/09/2020 0.830  0.400 - 4.000 uIU/mL Final    Cholesterol 09/09/2020 183  120 - 199 mg/dL Final    Comment: The National Cholesterol Education Program (NCEP) has set the  following guidelines (reference ranges) for Cholesterol:  Optimal.....................<200 mg/dL  Borderline High.............200-239 mg/dL  High........................> or = 240 mg/dL      Triglycerides 09/09/2020 87  30 - 150 mg/dL Final    Comment: The National Cholesterol Education Program (NCEP) has set the  following guidelines (reference values) for triglycerides:  Normal......................<150 mg/dL  Borderline High.............150-199 mg/dL  High........................200-499 mg/dL      HDL 09/09/2020 33* 40 - 75 mg/dL Final    Comment: The National Cholesterol Education Program (NCEP) has set the  following guidelines (reference values) for HDL Cholesterol:  Low...............<40 mg/dL  Optimal...........>60 mg/dL      LDL Cholesterol 09/09/2020 132.6  63.0 - 159.0 mg/dL Final    Comment: The National Cholesterol Education Program (NCEP) has set the  following guidelines (reference values) for LDL Cholesterol:  Optimal.......................<130 mg/dL  Borderline High...............130-159 mg/dL  High..........................160-189 mg/dL  Very High.....................>190 mg/dL      Hdl/Cholesterol Ratio 09/09/2020 18.0* 20.0 - 50.0 % Final    Total Cholesterol/HDL Ratio 09/09/2020 5.5* 2.0 - 5.0 Final    Non-HDL Cholesterol 09/09/2020 150  mg/dL Final    Comment: Risk category and Non-HDL cholesterol goals:  Coronary heart disease (CHD)or equivalent (10-year risk of CHD >20%):  Non-HDL cholesterol goal     <130 mg/dL  Two or more CHD risk factors and 10-year risk of  CHD <= 20%:  Non-HDL cholesterol goal     <160 mg/dL  0 to 1 CHD risk factor:  Non-HDL cholesterol goal     <190 mg/dL       Assessment:       1. Lung cancer, main bronchus, left    2. Secondary malignant neoplasm of bone    3. Anemia, chronic disease           Plan:   Stage IV squamous cell lung cancer, metastatic to bone  -Diagnosed Aug 2019, PDL1 5%, other molecular markers not sent  -ECOG 0  -Previously received carbo-taxol with RT (grade 3 anaphylaxis to taxol) Aug-Oct 2019 then Keytruda single agent until PET showed progression 9/10/20  -Screening for lungMAP (however may not be eligible given taxol reaction previously) vs other trial such as , discussing with pathology sending for additional studies  -Sending guardant today     Follow-up: pending further screening for possible trials    The following was staffed and discussed with supervising physician Dr. Shahid Patricio MD PGY-VI  Hematology/Oncology Fellow

## 2020-09-23 DIAGNOSIS — C34.02 LUNG CANCER, MAIN BRONCHUS, LEFT: Primary | ICD-10-CM

## 2020-09-24 ENCOUNTER — TELEPHONE (OUTPATIENT)
Dept: HEMATOLOGY/ONCOLOGY | Facility: CLINIC | Age: 60
End: 2020-09-24

## 2020-09-24 NOTE — TELEPHONE ENCOUNTER
"----- Message from Naeem Key sent at 9/24/2020  9:23 AM CDT -----  Consult/Advisory:    Name Of Caller: Monica [email or web address]   Contact Preference?: [phone] option 1 client services    What is the nature of the call?:  -Need primary ICD-10 code for diagnose lung squamous cell carcinoma     Additional Notes:  "Thank you for all that you do for our patients'"           "

## 2020-09-28 ENCOUNTER — LAB VISIT (OUTPATIENT)
Dept: LAB | Facility: HOSPITAL | Age: 60
End: 2020-09-28
Payer: COMMERCIAL

## 2020-09-28 DIAGNOSIS — C34.02 LUNG CANCER, MAIN BRONCHUS, LEFT: ICD-10-CM

## 2020-09-28 LAB
ALBUMIN SERPL BCP-MCNC: 4 G/DL (ref 3.5–5.2)
ALP SERPL-CCNC: 71 U/L (ref 55–135)
ALT SERPL W/O P-5'-P-CCNC: 23 U/L (ref 10–44)
ANION GAP SERPL CALC-SCNC: 7 MMOL/L (ref 8–16)
AST SERPL-CCNC: 18 U/L (ref 10–40)
BILIRUB SERPL-MCNC: 0.4 MG/DL (ref 0.1–1)
BUN SERPL-MCNC: 7 MG/DL (ref 6–20)
CALCIUM SERPL-MCNC: 9.4 MG/DL (ref 8.7–10.5)
CHLORIDE SERPL-SCNC: 104 MMOL/L (ref 95–110)
CO2 SERPL-SCNC: 30 MMOL/L (ref 23–29)
CREAT SERPL-MCNC: 0.8 MG/DL (ref 0.5–1.4)
ERYTHROCYTE [DISTWIDTH] IN BLOOD BY AUTOMATED COUNT: 13.1 % (ref 11.5–14.5)
EST. GFR  (AFRICAN AMERICAN): >60 ML/MIN/1.73 M^2
EST. GFR  (NON AFRICAN AMERICAN): >60 ML/MIN/1.73 M^2
GLUCOSE SERPL-MCNC: 144 MG/DL (ref 70–110)
HCT VFR BLD AUTO: 45.4 % (ref 40–54)
HGB BLD-MCNC: 14.6 G/DL (ref 14–18)
IMM GRANULOCYTES # BLD AUTO: 0.04 K/UL (ref 0–0.04)
MCH RBC QN AUTO: 30.7 PG (ref 27–31)
MCHC RBC AUTO-ENTMCNC: 32.2 G/DL (ref 32–36)
MCV RBC AUTO: 95 FL (ref 82–98)
NEUTROPHILS # BLD AUTO: 5.8 K/UL (ref 1.8–7.7)
PLATELET # BLD AUTO: 255 K/UL (ref 150–350)
PMV BLD AUTO: 9.4 FL (ref 9.2–12.9)
POTASSIUM SERPL-SCNC: 4 MMOL/L (ref 3.5–5.1)
PROT SERPL-MCNC: 7.5 G/DL (ref 6–8.4)
RBC # BLD AUTO: 4.76 M/UL (ref 4.6–6.2)
SODIUM SERPL-SCNC: 141 MMOL/L (ref 136–145)
WBC # BLD AUTO: 10.04 K/UL (ref 3.9–12.7)

## 2020-09-28 PROCEDURE — 36415 COLL VENOUS BLD VENIPUNCTURE: CPT

## 2020-09-28 PROCEDURE — 85027 COMPLETE CBC AUTOMATED: CPT

## 2020-09-28 PROCEDURE — 80053 COMPREHEN METABOLIC PANEL: CPT

## 2020-09-29 LAB
MISCELLANEOUS TEST NAME: NORMAL
REFERENCE LAB: NORMAL
SPECIMEN TYPE: NORMAL
TEST RESULT: NORMAL

## 2020-10-02 ENCOUNTER — OFFICE VISIT (OUTPATIENT)
Dept: HEMATOLOGY/ONCOLOGY | Facility: CLINIC | Age: 60
End: 2020-10-02
Payer: COMMERCIAL

## 2020-10-02 ENCOUNTER — RESEARCH ENCOUNTER (OUTPATIENT)
Dept: HEMATOLOGY/ONCOLOGY | Facility: CLINIC | Age: 60
End: 2020-10-02

## 2020-10-02 VITALS
BODY MASS INDEX: 35.03 KG/M2 | RESPIRATION RATE: 16 BRPM | HEIGHT: 70 IN | DIASTOLIC BLOOD PRESSURE: 79 MMHG | SYSTOLIC BLOOD PRESSURE: 144 MMHG | WEIGHT: 244.69 LBS | OXYGEN SATURATION: 96 % | TEMPERATURE: 99 F | HEART RATE: 73 BPM

## 2020-10-02 DIAGNOSIS — C34.02 LUNG CANCER, MAIN BRONCHUS, LEFT: Primary | ICD-10-CM

## 2020-10-02 DIAGNOSIS — C79.51 SECONDARY MALIGNANT NEOPLASM OF BONE: ICD-10-CM

## 2020-10-02 DIAGNOSIS — D63.8 ANEMIA, CHRONIC DISEASE: ICD-10-CM

## 2020-10-02 PROCEDURE — 3008F BODY MASS INDEX DOCD: CPT | Mod: CPTII,S$GLB,, | Performed by: INTERNAL MEDICINE

## 2020-10-02 PROCEDURE — 99999 PR PBB SHADOW E&M-EST. PATIENT-LVL IV: CPT | Mod: PBBFAC,,, | Performed by: INTERNAL MEDICINE

## 2020-10-02 PROCEDURE — 3008F PR BODY MASS INDEX (BMI) DOCUMENTED: ICD-10-PCS | Mod: CPTII,S$GLB,, | Performed by: INTERNAL MEDICINE

## 2020-10-02 PROCEDURE — 99999 PR PBB SHADOW E&M-EST. PATIENT-LVL IV: ICD-10-PCS | Mod: PBBFAC,,, | Performed by: INTERNAL MEDICINE

## 2020-10-02 PROCEDURE — 99214 OFFICE O/P EST MOD 30 MIN: CPT | Mod: S$GLB,,, | Performed by: INTERNAL MEDICINE

## 2020-10-02 PROCEDURE — 99214 PR OFFICE/OUTPT VISIT, EST, LEVL IV, 30-39 MIN: ICD-10-PCS | Mod: S$GLB,,, | Performed by: INTERNAL MEDICINE

## 2020-10-02 NOTE — PROGRESS NOTES
Kashif Iverson   MRN # [id number]  LungMap trial  10/02/2020     Screening Informed Consent     Dr Key spoke w/ pt and wife at his consult appt last week about clinical trials and pt was interested. He was waiting on Guardant results to come back, before seeing him again. These have resulted and did not show any mutations that would allow him to start oral drugs.  Pt is here to f/u regarding tx options.   Pt was introduced to LungMap trial by Dr Key at todays f/u appt. He was told it is a potential option for him after progressing on treatment for stage III lung cancer.  Dr Key discussed w/ pt that he would need another biopsy for trial. Pt would like to move forward w/ review of trial.   Both pt and his wife were present for Screening IC review.  Pt is AAO and in a pleasant mood.     Informed Consent was reviewed in full including: Overview and Key Information; Purpose; Screening; Number of Participants; What are study groups? (Including study chart displaying screening and sub-study assignment design); Procedure; Risks; Potential Benefits; Costs; Payment for Participation and/or Reimbursement of Expenses; Alternative Methods/Treatments; Study Related Questions and Compensation for Injury; Questions About Your Rights; Voluntary Participation and Withdrawal from Research; New Findings; Employees in Research; Confidentiality; Where can I get more information; Sections on optional studies; HIPAA Authorization to Release Information for Research.     Opportunity for questions given. All were answered to patient and his wifes satisfaction by Dr Key and myself. Pt stated he would like to proceed w/ signing the consent at this time. ICF was signed by pt and witnessed by myself. Pt was provided a copy of the signed ICF, along w/ my contact information.       Pt understands the next step in the process is to get him set up for a new biopsy since his specimen will prob not meet the protocols requirements  (per our pathologist). I also reviewed the process and time frames in which takes for us to get bx scheduled, pathology dx confirmation and then sent off for the results for substudy assignment, so there could be a potential delay in tx.of up to 4 weeks, possibly longer. Pt does agree to this since he states he is feeling good right now. Both Dr Key and myself instructed pt if he starts to develop any symptoms he should call the clinic, as he may need to just treatment off study.      Moving forward, pt gives permission for his wife, Natty Iverson, to be included in all of his decisions regarding tx and care specific to this trial. They have provided me w/ her contact information: cell # [phone] email- [email or web address]     I instructed pt to call clinic in the interim if any issues arise between today and his next visit. Pt verbalized understanding.    Dr Key will start referral process for IR biopsy.   I will f/u with pt.        (see link at bottom of page for scanned copy of screening ICF)

## 2020-10-02 NOTE — PROGRESS NOTES
ONCOLOGY FOLLOW UP VISIT.     Reason for visit: Treatment planning for stage IV NSCLC referred for clinical trial evaluation.    Best Contact Phone Number(s): [phone] (home)      Cancer/Stage/TNM:   Cancer Staging  No matching staging information was found for the patient.     Oncology History   Lung cancer, main bronchus, left   9/10/2019 Initial Diagnosis    Lung cancer, main bronchus, left     10/8/2019 - 10/28/2019 Chemotherapy    Treatment Summary   Plan Name: OP NSCLC PACLITAXEL + CARBOPLATIN (AUC) QW + RADIATION  Treatment Goal: Control  Status: Inactive  Start Date: 10/8/2019  End Date: 10/22/2019  Provider: Cricket Interiano MD  Chemotherapy: CARBOplatin (PARAPLATIN) 290 mg in sodium chloride 0.9% 250 mL chemo infusion, 290 mg (100 % of original dose 290 mg), Intravenous, Clinic/HOD 1 time, 3 of 6 cycles  Dose modification:   (original dose 290 mg, Cycle 1),   (original dose 300 mg, Cycle 2)  Administration: 290 mg (10/8/2019), 300 mg (10/15/2019)  PACLitaxel (TAXOL) 45 mg/m2 = 90 mg in sodium chloride 0.9% 250 mL chemo infusion, 45 mg/m2 = 90 mg, Intravenous, Clinic/HOD 1 time, 3 of 6 cycles  Administration: 90 mg (10/8/2019), 90 mg (10/15/2019), 90 mg (10/22/2019)     10/31/2019 - 9/10/2020 Chemotherapy    Treatment Summary   Plan Name: OP PEMBROLIZUMAB 200MG Q3W  Treatment Goal: Palliative  Status: Inactive  Start Date: 10/31/2019  End Date: 8/21/2020  Provider: Cricket Interiano MD  Chemotherapy: pembrolizumab (KEYTRUDA) 200 mg in sodium chloride 0.9% 108 mL chemo infusion, 200 mg, Intravenous, Clinic/HOD 1 time, 15 of 16 cycles  Administration: 200 mg (10/31/2019), 200 mg (11/21/2019), 200 mg (12/12/2019), 200 mg (1/2/2020), 200 mg (1/23/2020), 200 mg (2/14/2020), 200 mg (3/6/2020), 200 mg (3/27/2020), 200 mg (4/17/2020), 200 mg (5/8/2020), 200 mg (5/29/2020), 200 mg (6/19/2020), 200 mg (7/10/2020), 200 mg (7/31/2020), 200 mg (8/21/2020)     Secondary malignant neoplasm of bone    9/24/2019 Initial Diagnosis    Secondary malignant neoplasm of bone     10/8/2019 - 10/28/2019 Chemotherapy    Treatment Summary   Plan Name: OP NSCLC PACLITAXEL + CARBOPLATIN (AUC) QW + RADIATION  Treatment Goal: Control  Status: Inactive  Start Date: 10/8/2019  End Date: 10/22/2019  Provider: Cricket Interiano MD  Chemotherapy: CARBOplatin (PARAPLATIN) 290 mg in sodium chloride 0.9% 250 mL chemo infusion, 290 mg (100 % of original dose 290 mg), Intravenous, Clinic/HOD 1 time, 3 of 6 cycles  Dose modification:   (original dose 290 mg, Cycle 1),   (original dose 300 mg, Cycle 2)  Administration: 290 mg (10/8/2019), 300 mg (10/15/2019)  PACLitaxel (TAXOL) 45 mg/m2 = 90 mg in sodium chloride 0.9% 250 mL chemo infusion, 45 mg/m2 = 90 mg, Intravenous, Clinic/HOD 1 time, 3 of 6 cycles  Administration: 90 mg (10/8/2019), 90 mg (10/15/2019), 90 mg (10/22/2019)     10/31/2019 - 9/10/2020 Chemotherapy    Treatment Summary   Plan Name: OP PEMBROLIZUMAB 200MG Q3W  Treatment Goal: Palliative  Status: Active  Start Date: 10/31/2019  End Date: 5/29/2020  Provider: Crickte Interiano MD  Chemotherapy: pembrolizumab (KEYTRUDA) 200 mg in sodium chloride 0.9% 108 mL chemo infusion, 200 mg, Intravenous, Clinic/HOD 1 time, 11 of 11 cycles  Administration: 200 mg (10/31/2019), 200 mg (11/21/2019), 200 mg (12/12/2019), 200 mg (1/2/2020), 200 mg (1/23/2020), 200 mg (2/14/2020), 200 mg (3/6/2020), 200 mg (3/27/2020), 200 mg (4/17/2020), 200 mg (5/8/2020), 200 mg (5/29/2020)          Interval History:   Today patient reports no new symptoms.  Improved energy and appetite since off of pembrolizumab     ROS:  Review of Systems   Constitutional: Negative for activity change, appetite change, chills, fatigue, fever and unexpected weight change.   HENT: Negative for mouth sores, nosebleeds and sore throat.    Eyes: Negative for visual disturbance.   Respiratory: Negative for cough, shortness of breath and wheezing.   "  Cardiovascular: Negative for chest pain, palpitations and leg swelling.   Gastrointestinal: Negative for abdominal distention, abdominal pain, blood in stool and diarrhea.   Endocrine: Negative for cold intolerance and heat intolerance.   Genitourinary: Negative for dysuria, flank pain and frequency.   Musculoskeletal: Negative for arthralgias, back pain, joint swelling and myalgias.   Skin: Negative for color change, rash and wound.   Neurological: Negative for dizziness, light-headedness and headaches.   Hematological: Negative for adenopathy. Does not bruise/bleed easily.   Psychiatric/Behavioral: The patient is not nervous/anxious.       A complete 12-point review of systems was reviewed and is negative except as mentioned above.     Past Medical History:   Past Medical History:   Diagnosis Date    Hypertension     Lung cancer     Lung mass     left lung        Allergies:   Review of patient's allergies indicates:  No Known Allergies     Medications:   Current Outpatient Medications   Medication Sig Dispense Refill    ascorbic acid-multivit-min (VITAMIN C ENERGY BOOSTER) 1,000 mg PwEP Take by mouth. Patient takes 3,000 mg per day      atenoloL (TENORMIN) 25 MG tablet Take 1 tablet (25 mg total) by mouth once daily. 30 tablet 11     No current facility-administered medications for this visit.         Physical Exam:   BP (!) 144/79 (BP Location: Left arm, Patient Position: Sitting, BP Method: Medium (Automatic))   Pulse 73   Temp 98.6 °F (37 °C) (Oral)   Resp 16   Ht 5' 10" (1.778 m)   Wt 111 kg (244 lb 11.4 oz)   SpO2 96%   BMI 35.11 kg/m²      ECOG Performance Status: (foot note - ECOG PS provided by Eastern Cooperative Oncology Group) 0 - Asymptomatic    Physical Exam  Constitutional:       Appearance: He is well-developed.   HENT:      Head: Normocephalic and atraumatic.   Eyes:      Conjunctiva/sclera: Conjunctivae normal.      Pupils: Pupils are equal, round, and reactive to light.   Neck:      " Musculoskeletal: Normal range of motion and neck supple.   Cardiovascular:      Rate and Rhythm: Normal rate and regular rhythm.      Heart sounds: No murmur. No friction rub.   Pulmonary:      Effort: Pulmonary effort is normal.      Breath sounds: Normal breath sounds.   Abdominal:      General: Bowel sounds are normal.      Palpations: Abdomen is soft.      Tenderness: There is no abdominal tenderness.   Musculoskeletal: Normal range of motion.   Lymphadenopathy:      Cervical: No cervical adenopathy.   Skin:     General: Skin is warm and dry.   Neurological:      Mental Status: He is alert and oriented to person, place, and time.   Psychiatric:         Behavior: Behavior normal.           Labs:   No results found for this or any previous visit (from the past 48 hour(s)).     Imaging:   NM PET CT Routine Skull to Mid Thigh  Narrative: EXAMINATION:  NM PET CT ROUTINE    CLINICAL HISTORY:  Lung cancer; Malignant neoplasm of left main bronchus    TECHNIQUE:  11.37 mCi of F18-FDG was administered intravenously in the right antecubital fossa.  After an approximately 60 min distribution time, PET/CT images were acquired from the skull base to the mid thigh. Transmission images were acquired to correct for attenuation using a whole body low-dose CT scan without contrast with the arms positioned above the head. Glycemia at the time of injection was 95 mg/dL.    COMPARISON:  FDG PET-CT 02/13/2020, 09/19/2019    FINDINGS:  Quality of the study: Adequate.    In the neck, there is increased size and FDG avidity of several left level 3 and level 4 cervical lymph nodes.  Two left cervical lymph nodes demonstrate new FDG avidity (axial fused image 39 and 47).  Index left level 4 cervical lymph node measures 2.2 cm (previously 1.0 cm) and demonstrates SUV max of 10 (previously 5.8).    In the chest, there is increased FDG avidity of a left upper lobe opacity measuring 6.4 x 4.8 cm (previously 5.8 x 4.7 cm) with maximum SUV of 15  (previously 7.2).  Reported measurement likely includes adjacent atelectatic lung parenchyma in the setting of left upper lobe bronchial obstruction.  Stable 0.6 cm left lower lobe pulmonary nodule.    Index right paratracheal lymph node is stable in size measuring 1.4 cm and demonstrates maximum SUV 6.1 (previously 8.7).  Index subcarinal lymph node is stable in size measuring 1.6 cm and demonstrates maximum SUV of 7.8 (previously 7.1).  New mildly hypermetabolic left hilar lymph node with maximum SUV of 4.2 (axial fused image 74).    In the abdomen and pelvis, there is physiologic tracer distribution within the abdominal organs and excretion into the genitourinary system.    In the bones, there are no  hypermetabolic lesions worrisome for malignancy.  Linear cortical lucency and associated callus formation about the right posterolateral 9th rib suggest healing fracture.  Impression: Interval progression of disease with increased FDG avidity of a left upper lobe mass.  Progression of lymph node metastasis with new hypermetabolic left cervical and left hilar lymph nodes.  Additional findings as above.    I, Jevon Huston MD, attest that I reviewed and interpreted the images.    Electronically signed by resident: Jamar Fajardo  Date:    09/10/2020  Time:    10:10    Electronically signed by: Jevon Huston  Date:    09/10/2020  Time:    11:19            Diagnoses:       1. Lung cancer, main bronchus, left    2. Secondary malignant neoplasm of bone    3. Anemia, chronic disease          Assessment and Plan:         1,2. Stage IV NSCLC:   -Diagnosed Aug 2019, PDL1 5%, Guardant 360 without targetable  mutations  -ECOG 0  -Previously received carbo-taxol with RT (grade 3 anaphylaxis to taxol) Aug-Oct 2019 then Keytruda single agent until PET showed progression 9/10/20  -Screening for lungMAP today in order to obtain biopsy.  Will need to wait for biopsy, pathologic confirmation of NSCLC, then NGS through clinical trial  in order to know what treatment arm he would qualify.  This will take a minimum of 4 weeks which was discuss with patient.  With no symptoms from the cancer, the patient is willing to wait for these results.             Greater than 50% of this time involved counseling or coordination of care. I have provided the patient with an opportunity to ask questions and have all questions answered to his satisfaction.     he will return to clinic for consent once results from Lung MAP trial returns, but knows to call in the interim if symptoms change or should a problem arise.    Jevon Key MD  Medical Oncology   [facility]  [facility]

## 2020-10-02 NOTE — Clinical Note
Hello, I have placed a referral for a lung biopsy for this patient in order to be enrolled for a clinical trial.  We have had delays from this trial with enrollment, so if we could expedite this biopsy as soon as possible would be much, much appreciated.    Thanks so much  Jevon Key

## 2020-10-06 ENCOUNTER — TELEPHONE (OUTPATIENT)
Dept: INTERVENTIONAL RADIOLOGY/VASCULAR | Facility: HOSPITAL | Age: 60
End: 2020-10-06

## 2020-10-13 ENCOUNTER — OFFICE VISIT (OUTPATIENT)
Dept: INTERVENTIONAL RADIOLOGY/VASCULAR | Facility: CLINIC | Age: 60
End: 2020-10-13
Payer: COMMERCIAL

## 2020-10-13 VITALS
HEART RATE: 75 BPM | WEIGHT: 246.69 LBS | HEIGHT: 70 IN | DIASTOLIC BLOOD PRESSURE: 85 MMHG | SYSTOLIC BLOOD PRESSURE: 141 MMHG | BODY MASS INDEX: 35.32 KG/M2

## 2020-10-13 DIAGNOSIS — R59.1 LYMPHADENOPATHY: Primary | ICD-10-CM

## 2020-10-13 DIAGNOSIS — C34.02 LUNG CANCER, MAIN BRONCHUS, LEFT: ICD-10-CM

## 2020-10-13 PROCEDURE — 99999 PR PBB SHADOW E&M-EST. PATIENT-LVL III: ICD-10-PCS | Mod: PBBFAC,,, | Performed by: FAMILY MEDICINE

## 2020-10-13 PROCEDURE — 99244 PR OFFICE CONSULTATION,LEVEL IV: ICD-10-PCS | Mod: S$GLB,,, | Performed by: FAMILY MEDICINE

## 2020-10-13 PROCEDURE — 99244 OFF/OP CNSLTJ NEW/EST MOD 40: CPT | Mod: S$GLB,,, | Performed by: FAMILY MEDICINE

## 2020-10-13 PROCEDURE — 99999 PR PBB SHADOW E&M-EST. PATIENT-LVL III: CPT | Mod: PBBFAC,,, | Performed by: FAMILY MEDICINE

## 2020-10-13 NOTE — LETTER
October 13, 2020    Kashif Iverson  [address] [place] [address]     [facility]  [address]  [place] [address]  Phone: [phone] PRE-PROCEDURE INSTRUCTIONS    Your procedure with Interventional Radiology is scheduled for 10/27/2020. Please arrive by 8:30am. Please note your appointment time in [facility] will be different.    You must check-in and receive a wristband before going to your procedure. Your check-in location [facility].    **Do not eat or drink anything between midnight and the time of your procedure. This includes gum, mints, and candy lemon drops.    **Do not smoke or drink alcoholic beverages 24 hours prior to your procedure.    **If you wear contact lenses, dentures, hearing aids, or glasses, bring a container to put them in during the procedure and give them to a family member for safekeeping.    **If you have been diagnosed with sleep apnea please bring your CPAP machine.    **If your doctor has scheduled you for an overnight stay, bring a small overnight bag with any personal items that you may need.    **Make arrangements in advance for transportation home by a responsible adult. It is not safe to drive a vehicle during the 24 hours following the procedure.    **All [facility] facilities and properties are tobacco free. Smoking is NOT allowed.    PLEASE NOTE: The procedure schedule has many variables which affect the time of your procedure. Family members should be available if your surgery time changes.    If you have any questions about these instructions call Interventional Radiology at [phone] Monday - Friday between 8:00am and 4:00pm or [phone] (ask for interventional radiology resident) for after hours.

## 2020-10-13 NOTE — PROGRESS NOTES
"Subjective:       Patient ID: Kashif Iverson is a 59 y.o. male.    Chief Complaint: Lesion    Patient referred to Interventional Radiology by Dr. Key for evaluation of a left supraclavicular lymph node. Patient has a history of lung cancer, initial diagnosis 9/2019. Surveillance imaging with PET scan on 9/10/2020 noted "Progression of lymph node metastasis with new hypermetabolic left cervical and left hilar lymph nodes." He reports feeling well. He denies any palpable nodule. He denies any dyspnea, chronic cough, or shortness of breath. He is accompanied by his wife.     Review of Systems   Constitutional: Negative for activity change, appetite change, chills, fatigue and fever.   HENT: Negative for nasal congestion, drooling, ear discharge, postnasal drip, sneezing and trouble swallowing.    Eyes: Negative for pain, discharge, redness and itching.   Respiratory: Negative for cough, shortness of breath, wheezing and stridor.    Cardiovascular: Negative for chest pain, palpitations and leg swelling.   Gastrointestinal: Negative for abdominal distention, abdominal pain, constipation, diarrhea, nausea and vomiting.   Genitourinary: Negative for difficulty urinating, dysuria, frequency and urgency.   Musculoskeletal: Negative for arthralgias, back pain, gait problem, joint swelling, myalgias and neck pain.   Integumentary:  Negative for color change, pallor and rash.   Neurological: Negative for dizziness, weakness and headaches.         Objective:      Physical Exam  Vitals signs reviewed.   Constitutional:       General: He is not in acute distress.     Appearance: He is well-developed. He is not diaphoretic.   HENT:      Head: Normocephalic and atraumatic.      Right Ear: External ear normal.      Left Ear: External ear normal.      Nose: Nose normal.      Mouth/Throat:      Pharynx: No oropharyngeal exudate.   Eyes:      General: No scleral icterus.        Right eye: No discharge.         Left eye: No discharge. "      Conjunctiva/sclera: Conjunctivae normal.      Pupils: Pupils are equal, round, and reactive to light.   Neck:      Musculoskeletal: Neck supple.      Thyroid: No thyromegaly.      Vascular: No JVD.      Trachea: No tracheal deviation.   Cardiovascular:      Rate and Rhythm: Normal rate and regular rhythm.      Heart sounds: Normal heart sounds. No murmur. No friction rub. No gallop.    Pulmonary:      Effort: Pulmonary effort is normal. No respiratory distress.      Breath sounds: Normal breath sounds. No stridor. No wheezing or rales.   Abdominal:      General: Bowel sounds are normal. There is no distension.      Palpations: Abdomen is soft. There is no mass.      Tenderness: There is no abdominal tenderness. There is no guarding or rebound.   Lymphadenopathy:      Cervical: No cervical adenopathy.   Skin:     General: Skin is warm and dry.      Nails: There is no clubbing.     Neurological:      Mental Status: He is alert and oriented to person, place, and time.      Gait: Gait normal.       PET Scan 9/10/2020      Assessment:       1. Lymphadenopathy    2. Lung cancer, main bronchus, left        Plan:         Reviewed case with Dr. Zepeda. Explained to patient can offer biopsy of left supraclavicular lymph node. Discussed how the procedure will be performed, risks (including, but not limited to, pain, bleeding, infection, damage to nearby structures, and the need for additional procedures), benefits, possible complications, pre-post procedure expectations, and alternatives. The patient voices understanding and all questions have been answered.  The patient agrees to proceed as planned. Patient scheduled for 10/20/2020. Pre-procedure handout with clinic phone number provided. Patient will have pre-procedure labs drawn today.

## 2020-10-13 NOTE — Clinical Note
"Thank you for referring Mr. Iverson to Interventional Radiology at the [facility]. Please don't hesitate to contact us if there are any questions regarding this evaluation at [phone]. If you have any other patients for whom you would like a consultation, please place an order for "[id number]", and we will be happy to review their case.    Sincerely,  FABIENNE Bryan, FNP  Interventional Radiology"

## 2020-10-13 NOTE — LETTER
October 13, 2020    Kashif Iverson  [address] [place] [address]     [facility]  [address]  [place] [address]  Phone: [phone] PRE-PROCEDURE INSTRUCTIONS    Your procedure with Interventional Radiology is scheduled for 10/20/2020. Please arrive by 11:00am. Please note your appointment time in [facility] will be different.    You must check-in and receive a wristband before going to your procedure. Your check-in location is [facility].    **Do not eat or drink anything between midnight and the time of your procedure. This includes gum, mints, and candy lemon drops.    **Do not smoke or drink alcoholic beverages 24 hours prior to your procedure.    **If you wear contact lenses, dentures, hearing aids, or glasses, bring a container to put them in during the procedure and give them to a family member for safekeeping.    **If you have been diagnosed with sleep apnea please bring your CPAP machine.    **If your doctor has scheduled you for an overnight stay, bring a small overnight bag with any personal items that you may need.    **Make arrangements in advance for transportation home by a responsible adult. It is not safe to drive a vehicle during the 24 hours following the procedure.    **All [facility] facilities and properties are tobacco free. Smoking is NOT allowed.    PLEASE NOTE: The procedure schedule has many variables which affect the time of your procedure. Family members should be available if your surgery time changes.    If you have any questions about these instructions call Interventional Radiology at [phone] Monday - Friday between 8:00am and 4:00pm or [phone] (ask for interventional radiology resident) for after hours.

## 2020-10-19 DIAGNOSIS — Z00.6 EXAMINATION OF PARTICIPANT IN CLINICAL TRIAL: ICD-10-CM

## 2020-10-19 DIAGNOSIS — C34.02 LUNG CANCER, MAIN BRONCHUS, LEFT: Primary | ICD-10-CM

## 2020-10-20 ENCOUNTER — RESEARCH ENCOUNTER (OUTPATIENT)
Dept: HEMATOLOGY/ONCOLOGY | Facility: CLINIC | Age: 60
End: 2020-10-20

## 2020-10-20 ENCOUNTER — HOSPITAL ENCOUNTER (OUTPATIENT)
Dept: INTERVENTIONAL RADIOLOGY/VASCULAR | Facility: HOSPITAL | Age: 60
Discharge: HOME | End: 2020-10-20
Attending: FAMILY MEDICINE
Payer: COMMERCIAL

## 2020-10-20 VITALS
HEIGHT: 70 IN | OXYGEN SATURATION: 98 % | SYSTOLIC BLOOD PRESSURE: 120 MMHG | RESPIRATION RATE: 18 BRPM | DIASTOLIC BLOOD PRESSURE: 80 MMHG | BODY MASS INDEX: 35.22 KG/M2 | WEIGHT: 246 LBS | HEART RATE: 70 BPM | TEMPERATURE: 98 F

## 2020-10-20 DIAGNOSIS — R59.1 LYMPHADENOPATHY: ICD-10-CM

## 2020-10-20 DIAGNOSIS — C34.02 LUNG CANCER, MAIN BRONCHUS, LEFT: ICD-10-CM

## 2020-10-20 PROCEDURE — 25000003 PHARM REV CODE 250: Performed by: FAMILY MEDICINE

## 2020-10-20 PROCEDURE — 88307 TISSUE EXAM BY PATHOLOGIST: CPT | Performed by: PATHOLOGY

## 2020-10-20 PROCEDURE — 38505 IR BIOPSY LYMPH NODE: ICD-10-PCS | Mod: ,,, | Performed by: STUDENT IN AN ORGANIZED HEALTH CARE EDUCATION/TRAINING PROGRAM

## 2020-10-20 PROCEDURE — 88333 PR  INTRAOPERATIVE CYTO PATH CONSULT, INITIAL SITE: ICD-10-PCS | Mod: 26,,, | Performed by: PATHOLOGY

## 2020-10-20 PROCEDURE — 99152 PR MOD CONSCIOUS SEDATION, SAME PHYS, 5+ YRS, FIRST 15 MIN: ICD-10-PCS | Mod: ,,, | Performed by: STUDENT IN AN ORGANIZED HEALTH CARE EDUCATION/TRAINING PROGRAM

## 2020-10-20 PROCEDURE — 77012 CT SCAN FOR NEEDLE BIOPSY: CPT | Mod: 26,,, | Performed by: STUDENT IN AN ORGANIZED HEALTH CARE EDUCATION/TRAINING PROGRAM

## 2020-10-20 PROCEDURE — 88333 PATH CONSLTJ SURG CYTO XM 1: CPT | Mod: 26,,, | Performed by: PATHOLOGY

## 2020-10-20 PROCEDURE — 38505 NEEDLE BIOPSY LYMPH NODES: CPT | Mod: ,,, | Performed by: STUDENT IN AN ORGANIZED HEALTH CARE EDUCATION/TRAINING PROGRAM

## 2020-10-20 PROCEDURE — 63600175 PHARM REV CODE 636 W HCPCS: Performed by: STUDENT IN AN ORGANIZED HEALTH CARE EDUCATION/TRAINING PROGRAM

## 2020-10-20 PROCEDURE — 77012 PR  CT GUIDANCE NEEDLE PLACEMENT: ICD-10-PCS | Mod: 26,,, | Performed by: STUDENT IN AN ORGANIZED HEALTH CARE EDUCATION/TRAINING PROGRAM

## 2020-10-20 PROCEDURE — 77012 CT SCAN FOR NEEDLE BIOPSY: CPT | Mod: TC

## 2020-10-20 PROCEDURE — 27201068 IR BIOPSY LYMPH NODE

## 2020-10-20 PROCEDURE — 99152 MOD SED SAME PHYS/QHP 5/>YRS: CPT

## 2020-10-20 PROCEDURE — 88307 TISSUE EXAM BY PATHOLOGIST: CPT | Mod: 26,,, | Performed by: PATHOLOGY

## 2020-10-20 PROCEDURE — 99152 MOD SED SAME PHYS/QHP 5/>YRS: CPT | Mod: ,,, | Performed by: STUDENT IN AN ORGANIZED HEALTH CARE EDUCATION/TRAINING PROGRAM

## 2020-10-20 PROCEDURE — 88333 PATH CONSLTJ SURG CYTO XM 1: CPT | Performed by: PATHOLOGY

## 2020-10-20 PROCEDURE — 88307 PR  SURG PATH,LEVEL V: ICD-10-PCS | Mod: 26,,, | Performed by: PATHOLOGY

## 2020-10-20 PROCEDURE — 99153 MOD SED SAME PHYS/QHP EA: CPT

## 2020-10-20 RX ORDER — MIDAZOLAM HYDROCHLORIDE 1 MG/ML
INJECTION INTRAMUSCULAR; INTRAVENOUS CODE/TRAUMA/SEDATION MEDICATION
Status: COMPLETED | OUTPATIENT
Start: 2020-10-20 | End: 2020-10-20

## 2020-10-20 RX ORDER — LIDOCAINE HYDROCHLORIDE 10 MG/ML
1 INJECTION, SOLUTION EPIDURAL; INFILTRATION; INTRACAUDAL; PERINEURAL ONCE
Status: COMPLETED | OUTPATIENT
Start: 2020-10-20 | End: 2020-10-20

## 2020-10-20 RX ORDER — SODIUM CHLORIDE 9 MG/ML
INJECTION, SOLUTION INTRAVENOUS CONTINUOUS
Status: DISCONTINUED | OUTPATIENT
Start: 2020-10-20 | End: 2020-10-21 | Stop reason: HOSPADM

## 2020-10-20 RX ORDER — FENTANYL CITRATE 50 UG/ML
INJECTION, SOLUTION INTRAMUSCULAR; INTRAVENOUS CODE/TRAUMA/SEDATION MEDICATION
Status: COMPLETED | OUTPATIENT
Start: 2020-10-20 | End: 2020-10-20

## 2020-10-20 RX ADMIN — MIDAZOLAM HYDROCHLORIDE 0.5 MG: 1 INJECTION, SOLUTION INTRAMUSCULAR; INTRAVENOUS at 02:10

## 2020-10-20 RX ADMIN — LIDOCAINE HYDROCHLORIDE 0.1 MG: 10 INJECTION, SOLUTION EPIDURAL; INFILTRATION; INTRACAUDAL; PERINEURAL at 10:10

## 2020-10-20 RX ADMIN — SODIUM CHLORIDE: 9 INJECTION, SOLUTION INTRAVENOUS at 10:10

## 2020-10-20 RX ADMIN — FENTANYL CITRATE 50 MCG: 50 INJECTION, SOLUTION INTRAMUSCULAR; INTRAVENOUS at 02:10

## 2020-10-20 RX ADMIN — MIDAZOLAM HYDROCHLORIDE 1 MG: 1 INJECTION, SOLUTION INTRAMUSCULAR; INTRAVENOUS at 02:10

## 2020-10-20 RX ADMIN — FENTANYL CITRATE 25 MCG: 50 INJECTION, SOLUTION INTRAMUSCULAR; INTRAVENOUS at 02:10

## 2020-10-20 NOTE — DISCHARGE INSTRUCTIONS
For scheduling: Call Karina at [phone]    For questions or concerns call: EDGARDO MON-FRI 8 AM- 5PM [phone]. Radiology resident on call [phone].    For immediate concerns that are not emergent, you may call our radiology clinic at: [phone]

## 2020-10-20 NOTE — DISCHARGE SUMMARY
"Radiology Discharge Summary      Hospital Course: No complications    Admit Date: 10/20/2020  Discharge Date: 10/20/2020     Instructions Given to Patient: Yes  Diet: Resume prior diet  Activity: activity as tolerated and no driving for today    Description of Condition on Discharge: Stable  Vital Signs (Most Recent): Temp: 98 °F (36.7 °C) (10/20/20 1515)  Pulse: 70 (10/20/20 1615)  Resp: 18 (10/20/20 1615)  BP: 120/80 (10/20/20 1615)  SpO2: 98 % (10/20/20 1615)    Discharge Disposition: Home    Discharge Diagnosis: supraclavicular lymph node     Follow-up: as scheduled    Davy Gonzalez MD ([place])  Interventional Radiology  [phone]        "

## 2020-10-20 NOTE — PLAN OF CARE
Pt resting comfortably.    Call light in reach.    No questions or concerns at this time.    Wife at bedside; belongings under stretcher

## 2020-10-20 NOTE — PROGRESS NOTES
Assumed care of pt, no9 distress noted. Spoke with IR, reports about 30 mins til they are ready for pt. Pt and wife updated.

## 2020-10-20 NOTE — PROGRESS NOTES
Kashif Iverson   MRN # [id number]  LungMap trial  10/20/2020     STEP 0 registration:  Screening ctDNA blood       Pt arrived this morning to have research blood drawn for ctDNA, per section 15.3, since we will be submitting tissue from scheduled biopsy this afternoon. These samples were labeled and packaged per kit instructions.  Specimens were logged into [employer] system. Shipping manifest was printed and packaged w/ shipment.  They will be sent out ambient today via [employer].    Pt is scheduled this afternoon for biopsy. Will f/u with pathology to request block, once specimen has been reviewed and diagnosis is confirmed.

## 2020-10-20 NOTE — H&P
Radiology History & Physical      SUBJECTIVE:     Chief Complaint: lung cancer     History of Present Illness:  Kashif Iverson is a 59 y.o. male who presents for left supraclavicular lymph node biopsy     Past Medical History:   Diagnosis Date    Hypertension     Lung cancer     Lung mass     left lung     Past Surgical History:   Procedure Laterality Date    BRONCHOSCOPY N/A 8/30/2019    Procedure: Bronchoscopy;  Surgeon: Miguel Diagnostic Provider;  Location: [facility] OR [address] [place] [address];  Service: Anesthesiology;  Laterality: N/A;       Home Meds:   Prior to Admission medications    Medication Sig Start Date End Date Taking? Authorizing Provider   atenoloL (TENORMIN) 25 MG tablet Take 1 tablet (25 mg total) by mouth once daily. 2/20/20 2/19/21 Yes Eduardo Ruiz MD   ascorbic acid-multivit-min (VITAMIN C ENERGY BOOSTER) 1,000 mg PwEP Take by mouth. Patient takes 3,000 mg per day    Historical Provider     Anticoagulants/Antiplatelets: no anticoagulation    Allergies: Review of patient's allergies indicates:  No Known Allergies  Sedation History:  no adverse reactions    Review of Systems:   Hematological: no known coagulopathies  Respiratory: no shortness of breath  Cardiovascular: no chest pain  Gastrointestinal: no abdominal pain  Genito-Urinary: no dysuria  Musculoskeletal: negative  Neurological: no TIA or stroke symptoms         OBJECTIVE:     Vital Signs (Most Recent)  Temp: 98.7 °F (37.1 °C) (10/20/20 1047)  Pulse: 72 (10/20/20 1047)  Resp: 19 (10/20/20 1047)  BP: (!) 162/93 (10/20/20 1047)  SpO2: 100 % (10/20/20 1047)    Physical Exam:  ASA: 3  Mallampati: 3    General: no acute distress  Mental Status: alert and oriented to person, place and time  HEENT: normocephalic, atraumatic  Chest: unlabored breathing  Heart: regular heart rate  Abdomen: nondistended  Extremity: moves all extremities    Laboratory  Lab Results   Component Value Date    INR 1.0 10/13/2020       Lab Results   Component Value Date    WBC 10.04  09/28/2020    HGB 14.6 09/28/2020    HCT 45.4 09/28/2020    MCV 95 09/28/2020     09/28/2020      Lab Results   Component Value Date     (H) 09/28/2020     09/28/2020    K 4.0 09/28/2020     09/28/2020    CO2 30 (H) 09/28/2020    BUN 7 09/28/2020    CREATININE 0.8 09/28/2020    CALCIUM 9.4 09/28/2020    MG 1.6 08/23/2019    ALT 23 09/28/2020    AST 18 09/28/2020    ALBUMIN 4.0 09/28/2020    BILITOT 0.4 09/28/2020    BILIDIR 0.8 (H) 08/23/2019       ASSESSMENT/PLAN:     Sedation Plan: up to moderate   Patient will undergo left supraclavicular lymph node biopsy.    Jamar Fajardo MD  Department of Radiology   PGY V  [phone]

## 2020-10-20 NOTE — PLAN OF CARE
Plan of care reviewed with patient, patient verbalized understanding.  Pt arrived to Interventional Radiology room 173 via stretcher for left supraclavicular lymph node biopsy.  Name verified using two identifiers.  Allergies verified.  Labs, orders and consent reviewed on chart.  Pt oriented to unit and staff.  See flow sheet for documentation, monitoring and medication administration. Will continue to monitor.

## 2020-10-20 NOTE — NURSING
Recovery complete, supraclavicular biopsy site and bandaid dressing is clean, dry, intact, and without bleeding or hematoma, pt verbalized understanding of all discharge instructions, avs provided, dressed independently, escorted per wheelchair to waiting room and discharged under care of spouse

## 2020-10-20 NOTE — PROCEDURES
"  Pre Op Diagnosis: supraclavicular lymph node  Post Op Diagnosis: Same    Procedure: LN biopsy    Procedure performed by: Lisa    Written Informed Consent Obtained: Yes  Specimen Removed: YES 3 cores  Estimated Blood Loss: Minimal    Findings:   Successful biopsy of left supraclavicular lymph node    Patient tolerated procedure well.    Davy Gonzalez MD ([place])  Interventional Radiology  [phone]        "

## 2020-10-20 NOTE — PLAN OF CARE
Left supraclavicular lymph node biopsy complete. Pt tolerated well. VSS. No signs or symptoms of distress noted.  Dressing to clavicle area CDI.  Pt will be transferred to ROCU bed and report to be given at bedside.

## 2020-10-21 LAB
ADEQUACY: NORMAL
FINAL PATHOLOGIC DIAGNOSIS: NORMAL
GROSS: NORMAL

## 2020-10-22 ENCOUNTER — RESEARCH ENCOUNTER (OUTPATIENT)
Dept: HEMATOLOGY/ONCOLOGY | Facility: CLINIC | Age: 60
End: 2020-10-22

## 2020-10-22 NOTE — PROGRESS NOTES
Kashif Iverson  MRN # [id number]  LungMap trial  10/22/2020     Study ID# [id number] (previously assigned at STEP 0 registration)     Screening Eligibility Note     Patient meets all eligibility requirements for LungMAP screening at progression on prior treatment. Eligibility reviewed by 2 Clinical Research RNs and Investigator.       Registration worksheet completed and reviewed with Dr. Key. Form signed and dated on 10/22/2020 by Dr. Key. Patient deemed eligible based on the screening eligibility criteria linked to this encounter.         Registration Note     Tissue block received from pathology dept, along w/ the completed and signed Local Path Review Form.   Patient registered to step 1 in OPEN. Tissue specimen logged in [facility] and packaged per protocol. Shipping Manifest, Pathology review form and copy of pathology report was packaged w/  tissue and shipped today via [employer], per protocol. Tracking # [id number].      Patients wife was called and updated on above. I have instructed them we will notify them as soon as we are emailed the results. They know it will take about 16 days to receive a sub-study assignment. We can then start the process of screening pt for the sub-study that pt is assigned to.      Pt's wife had no further questions and she was encouraged to call if any questions or concerns should arise in the interim.

## 2020-10-30 ENCOUNTER — PATIENT MESSAGE (OUTPATIENT)
Dept: ADMINISTRATIVE | Facility: HOSPITAL | Age: 60
End: 2020-10-30

## 2020-11-04 DIAGNOSIS — Z00.6 EXAMINATION OF PARTICIPANT IN CLINICAL TRIAL: ICD-10-CM

## 2020-11-04 DIAGNOSIS — Z13.9 SCREENING FOR CONDITION: ICD-10-CM

## 2020-11-04 DIAGNOSIS — C34.02 LUNG CANCER, MAIN BRONCHUS, LEFT: Primary | ICD-10-CM

## 2020-11-05 ENCOUNTER — LAB VISIT (OUTPATIENT)
Dept: LAB | Facility: HOSPITAL | Age: 60
End: 2020-11-05
Payer: COMMERCIAL

## 2020-11-05 ENCOUNTER — RESEARCH ENCOUNTER (OUTPATIENT)
Dept: HEMATOLOGY/ONCOLOGY | Facility: CLINIC | Age: 60
End: 2020-11-05

## 2020-11-05 ENCOUNTER — OFFICE VISIT (OUTPATIENT)
Dept: HEMATOLOGY/ONCOLOGY | Facility: CLINIC | Age: 60
End: 2020-11-05
Payer: COMMERCIAL

## 2020-11-05 VITALS
SYSTOLIC BLOOD PRESSURE: 138 MMHG | OXYGEN SATURATION: 97 % | WEIGHT: 243.81 LBS | TEMPERATURE: 100 F | HEIGHT: 70 IN | DIASTOLIC BLOOD PRESSURE: 86 MMHG | HEART RATE: 73 BPM | BODY MASS INDEX: 34.9 KG/M2

## 2020-11-05 VITALS
TEMPERATURE: 100 F | DIASTOLIC BLOOD PRESSURE: 86 MMHG | HEIGHT: 70 IN | BODY MASS INDEX: 34.9 KG/M2 | WEIGHT: 243.81 LBS | HEART RATE: 73 BPM | RESPIRATION RATE: 18 BRPM | OXYGEN SATURATION: 97 % | SYSTOLIC BLOOD PRESSURE: 138 MMHG

## 2020-11-05 DIAGNOSIS — D63.8 ANEMIA, CHRONIC DISEASE: ICD-10-CM

## 2020-11-05 DIAGNOSIS — Z00.6 EXAMINATION OF PARTICIPANT IN CLINICAL TRIAL: ICD-10-CM

## 2020-11-05 DIAGNOSIS — C34.02 LUNG CANCER, MAIN BRONCHUS, LEFT: Primary | ICD-10-CM

## 2020-11-05 DIAGNOSIS — C79.51 SECONDARY MALIGNANT NEOPLASM OF BONE: ICD-10-CM

## 2020-11-05 DIAGNOSIS — C34.02 LUNG CANCER, MAIN BRONCHUS, LEFT: ICD-10-CM

## 2020-11-05 DIAGNOSIS — Z09 CHEMOTHERAPY FOLLOW-UP EXAMINATION: ICD-10-CM

## 2020-11-05 LAB
ALBUMIN SERPL BCP-MCNC: 3.9 G/DL (ref 3.5–5.2)
ALP SERPL-CCNC: 78 U/L (ref 55–135)
ALT SERPL W/O P-5'-P-CCNC: 21 U/L (ref 10–44)
ANION GAP SERPL CALC-SCNC: 10 MMOL/L (ref 8–16)
APTT BLDCRRT: 29.1 SEC (ref 21–32)
AST SERPL-CCNC: 20 U/L (ref 10–40)
BASOPHILS # BLD AUTO: 0.08 K/UL (ref 0–0.2)
BASOPHILS NFR BLD: 0.7 % (ref 0–1.9)
BILIRUB SERPL-MCNC: 0.4 MG/DL (ref 0.1–1)
BUN SERPL-MCNC: 10 MG/DL (ref 6–20)
CALCIUM SERPL-MCNC: 9.6 MG/DL (ref 8.7–10.5)
CHLORIDE SERPL-SCNC: 103 MMOL/L (ref 95–110)
CO2 SERPL-SCNC: 28 MMOL/L (ref 23–29)
CREAT SERPL-MCNC: 0.8 MG/DL (ref 0.5–1.4)
DIFFERENTIAL METHOD: ABNORMAL
EOSINOPHIL # BLD AUTO: 0.2 K/UL (ref 0–0.5)
EOSINOPHIL NFR BLD: 2 % (ref 0–8)
ERYTHROCYTE [DISTWIDTH] IN BLOOD BY AUTOMATED COUNT: 13.2 % (ref 11.5–14.5)
EST. GFR  (AFRICAN AMERICAN): >60 ML/MIN/1.73 M^2
EST. GFR  (NON AFRICAN AMERICAN): >60 ML/MIN/1.73 M^2
GLUCOSE SERPL-MCNC: 114 MG/DL (ref 70–110)
HCT VFR BLD AUTO: 47 % (ref 40–54)
HGB BLD-MCNC: 15.2 G/DL (ref 14–18)
IMM GRANULOCYTES # BLD AUTO: 0.03 K/UL (ref 0–0.04)
IMM GRANULOCYTES NFR BLD AUTO: 0.2 % (ref 0–0.5)
INR PPP: 1 (ref 0.8–1.2)
LYMPHOCYTES # BLD AUTO: 2.2 K/UL (ref 1–4.8)
LYMPHOCYTES NFR BLD: 17.7 % (ref 18–48)
MAGNESIUM SERPL-MCNC: 1.9 MG/DL (ref 1.6–2.6)
MCH RBC QN AUTO: 30.8 PG (ref 27–31)
MCHC RBC AUTO-ENTMCNC: 32.3 G/DL (ref 32–36)
MCV RBC AUTO: 95 FL (ref 82–98)
MONOCYTES # BLD AUTO: 1.2 K/UL (ref 0.3–1)
MONOCYTES NFR BLD: 9.5 % (ref 4–15)
NEUTROPHILS # BLD AUTO: 8.5 K/UL (ref 1.8–7.7)
NEUTROPHILS NFR BLD: 69.9 % (ref 38–73)
NRBC BLD-RTO: 0 /100 WBC
PLATELET # BLD AUTO: 279 K/UL (ref 150–350)
PMV BLD AUTO: 9.6 FL (ref 9.2–12.9)
POTASSIUM SERPL-SCNC: 4.4 MMOL/L (ref 3.5–5.1)
PROT SERPL-MCNC: 7.8 G/DL (ref 6–8.4)
PROTHROMBIN TIME: 10.6 SEC (ref 9–12.5)
RBC # BLD AUTO: 4.93 M/UL (ref 4.6–6.2)
SODIUM SERPL-SCNC: 141 MMOL/L (ref 136–145)
T4 FREE SERPL-MCNC: 1.02 NG/DL (ref 0.71–1.51)
TSH SERPL DL<=0.005 MIU/L-ACNC: 0.57 UIU/ML (ref 0.4–4)
WBC # BLD AUTO: 12.12 K/UL (ref 3.9–12.7)

## 2020-11-05 PROCEDURE — 99999 PR PBB SHADOW E&M-EST. PATIENT-LVL III: CPT | Mod: PBBFAC,,, | Performed by: INTERNAL MEDICINE

## 2020-11-05 PROCEDURE — 3008F PR BODY MASS INDEX (BMI) DOCUMENTED: ICD-10-PCS | Mod: CPTII,S$GLB,, | Performed by: INTERNAL MEDICINE

## 2020-11-05 PROCEDURE — 84439 ASSAY OF FREE THYROXINE: CPT

## 2020-11-05 PROCEDURE — 99214 PR OFFICE/OUTPT VISIT, EST, LEVL IV, 30-39 MIN: ICD-10-PCS | Mod: S$GLB,,, | Performed by: INTERNAL MEDICINE

## 2020-11-05 PROCEDURE — 36415 COLL VENOUS BLD VENIPUNCTURE: CPT

## 2020-11-05 PROCEDURE — 85610 PROTHROMBIN TIME: CPT

## 2020-11-05 PROCEDURE — 99999 PR PBB SHADOW E&M-EST. PATIENT-LVL III: ICD-10-PCS | Mod: PBBFAC,,, | Performed by: INTERNAL MEDICINE

## 2020-11-05 PROCEDURE — 99214 OFFICE O/P EST MOD 30 MIN: CPT | Mod: S$GLB,,, | Performed by: INTERNAL MEDICINE

## 2020-11-05 PROCEDURE — 84443 ASSAY THYROID STIM HORMONE: CPT

## 2020-11-05 PROCEDURE — 83735 ASSAY OF MAGNESIUM: CPT

## 2020-11-05 PROCEDURE — 3008F BODY MASS INDEX DOCD: CPT | Mod: CPTII,S$GLB,, | Performed by: INTERNAL MEDICINE

## 2020-11-05 PROCEDURE — 80053 COMPREHEN METABOLIC PANEL: CPT

## 2020-11-05 PROCEDURE — 85730 THROMBOPLASTIN TIME PARTIAL: CPT

## 2020-11-05 PROCEDURE — 85025 COMPLETE CBC W/AUTO DIFF WBC: CPT

## 2020-11-05 NOTE — PROGRESS NOTES
ONCOLOGY FOLLOW UP VISIT.     Reason for visit: Consent for clinical trial for stage IV NSCLC    Best Contact Phone Number(s): [phone] (home)      Cancer/Stage/TNM:   Cancer Staging  No matching staging information was found for the patient.     Oncology History   Lung cancer, main bronchus, left   9/10/2019 Initial Diagnosis    Lung cancer, main bronchus, left     10/8/2019 - 10/28/2019 Chemotherapy    Treatment Summary   Plan Name: OP NSCLC PACLITAXEL + CARBOPLATIN (AUC) QW + RADIATION  Treatment Goal: Control  Status: Inactive  Start Date: 10/8/2019  End Date: 10/22/2019  Provider: Cricket Interiano MD  Chemotherapy: CARBOplatin (PARAPLATIN) 290 mg in sodium chloride 0.9% 250 mL chemo infusion, 290 mg (100 % of original dose 290 mg), Intravenous, Clinic/HOD 1 time, 3 of 6 cycles  Dose modification:   (original dose 290 mg, Cycle 1),   (original dose 300 mg, Cycle 2)  Administration: 290 mg (10/8/2019), 300 mg (10/15/2019)  PACLitaxel (TAXOL) 45 mg/m2 = 90 mg in sodium chloride 0.9% 250 mL chemo infusion, 45 mg/m2 = 90 mg, Intravenous, Clinic/HOD 1 time, 3 of 6 cycles  Administration: 90 mg (10/8/2019), 90 mg (10/15/2019), 90 mg (10/22/2019)     10/31/2019 - 9/10/2020 Chemotherapy    Treatment Summary   Plan Name: OP PEMBROLIZUMAB 200MG Q3W  Treatment Goal: Palliative  Status: Inactive  Start Date: 10/31/2019  End Date: 8/21/2020  Provider: Cricket Interiano MD  Chemotherapy: pembrolizumab (KEYTRUDA) 200 mg in sodium chloride 0.9% 108 mL chemo infusion, 200 mg, Intravenous, Clinic/HOD 1 time, 15 of 16 cycles  Administration: 200 mg (10/31/2019), 200 mg (11/21/2019), 200 mg (12/12/2019), 200 mg (1/2/2020), 200 mg (1/23/2020), 200 mg (2/14/2020), 200 mg (3/6/2020), 200 mg (3/27/2020), 200 mg (4/17/2020), 200 mg (5/8/2020), 200 mg (5/29/2020), 200 mg (6/19/2020), 200 mg (7/10/2020), 200 mg (7/31/2020), 200 mg (8/21/2020)     Secondary malignant neoplasm of bone   9/24/2019 Initial Diagnosis     Secondary malignant neoplasm of bone     10/8/2019 - 10/28/2019 Chemotherapy    Treatment Summary   Plan Name: OP NSCLC PACLITAXEL + CARBOPLATIN (AUC) QW + RADIATION  Treatment Goal: Control  Status: Inactive  Start Date: 10/8/2019  End Date: 10/22/2019  Provider: Cricket Interiano MD  Chemotherapy: CARBOplatin (PARAPLATIN) 290 mg in sodium chloride 0.9% 250 mL chemo infusion, 290 mg (100 % of original dose 290 mg), Intravenous, Clinic/HOD 1 time, 3 of 6 cycles  Dose modification:   (original dose 290 mg, Cycle 1),   (original dose 300 mg, Cycle 2)  Administration: 290 mg (10/8/2019), 300 mg (10/15/2019)  PACLitaxel (TAXOL) 45 mg/m2 = 90 mg in sodium chloride 0.9% 250 mL chemo infusion, 45 mg/m2 = 90 mg, Intravenous, Clinic/HOD 1 time, 3 of 6 cycles  Administration: 90 mg (10/8/2019), 90 mg (10/15/2019), 90 mg (10/22/2019)     10/31/2019 - 9/10/2020 Chemotherapy    Treatment Summary   Plan Name: OP PEMBROLIZUMAB 200MG Q3W  Treatment Goal: Palliative  Status: Active  Start Date: 10/31/2019  End Date: 5/29/2020  Provider: Cricket Interiano MD  Chemotherapy: pembrolizumab (KEYTRUDA) 200 mg in sodium chloride 0.9% 108 mL chemo infusion, 200 mg, Intravenous, Clinic/HOD 1 time, 11 of 11 cycles  Administration: 200 mg (10/31/2019), 200 mg (11/21/2019), 200 mg (12/12/2019), 200 mg (1/2/2020), 200 mg (1/23/2020), 200 mg (2/14/2020), 200 mg (3/6/2020), 200 mg (3/27/2020), 200 mg (4/17/2020), 200 mg (5/8/2020), 200 mg (5/29/2020)          Interval History:   Today patient reports only new symptoms is some upper back pain.  He was clearing tree branches in his yard inciting this pain.  He denies diarrhea, HA, vision changes, SOB, hemoptysis, cough.     Practical Problems Physical Problems   : (P) No Appearance: (P) No   Housing: (P) No Bathing / Dressing: (P) No   Insurance / Financial: (P) Yes Breathing: (P) No    Transportation: (P) No  Changes in Urination: (P) No    Work / School: (P) No   Constipation: (P) No   Treatment Decisions: (P) No  Diarrhea: (P) No     Eating: (P) No    Family Problems Fatigue: (P) No    Dealing with Children: (P) No Feeling Swollen: (P) No    Dealing with Partner: (P) No Fevers: (P) No    Ability to Have Children: (P) No  Getting Around: (P) No       Indigestion: (P) No     Memory / Concentration: (P) No   Emotional Problems Mouth Sores: (P) No    Depression: (P) No  Nausea: (P) No    Fears: (P) Yes  Nose Dry / Congested: (P) No    Nervousness: (P) Yes  Pain: (P) No    Sadness: (P) No Sexual: (P) No    Worry: (P) Yes Skin Dry / Itchy: (P) No    Loss of Interest in Usual Activities: (P) No Sleep: (P) No     Substance Abuse: (P) No    Spiritual/Religions Concerns Tingling in Hands / Feet: (P) No   Spritual / Yarsani Concerns: (P) No         Other Problems            ROS:  Review of Systems   Constitutional: Negative for activity change, chills, fatigue, fever and unexpected weight change.   HENT: Negative for mouth sores, nosebleeds and sore throat.    Respiratory: Negative for cough, shortness of breath and wheezing.    Cardiovascular: Negative for chest pain, palpitations and leg swelling.   Gastrointestinal: Negative for abdominal distention, abdominal pain and blood in stool.   Endocrine: Negative for cold intolerance and heat intolerance.   Genitourinary: Negative for dysuria, flank pain and frequency.   Musculoskeletal: Positive for back pain. Negative for arthralgias, joint swelling and myalgias.   Skin: Negative for color change, rash and wound.   Neurological: Negative for dizziness, light-headedness and headaches.   Hematological: Negative for adenopathy. Does not bruise/bleed easily.      A complete 12-point review of systems was reviewed and is negative except as mentioned above.     Past Medical History:   Past Medical History:   Diagnosis Date    Hypertension     Lung cancer     Lung mass     left lung        Allergies:   Review of patient's allergies  "indicates:  No Known Allergies     Medications:   Current Outpatient Medications   Medication Sig Dispense Refill    ascorbic acid-multivit-min (VITAMIN C ENERGY BOOSTER) 1,000 mg PwEP Take by mouth. Patient takes 3,000 mg per day      atenoloL (TENORMIN) 25 MG tablet Take 1 tablet (25 mg total) by mouth once daily. 30 tablet 11     No current facility-administered medications for this visit.         Physical Exam:   /86 (BP Location: Left arm, Patient Position: Sitting, BP Method: Large (Automatic))   Pulse 73   Temp 99.9 °F (37.7 °C) (Oral)   Ht 5' 10" (1.778 m)   Wt 110.6 kg (243 lb 13.3 oz)   SpO2 97%   BMI 34.99 kg/m²      ECOG Performance Status: (foot note - ECOG PS provided by Eastern Cooperative Oncology Group) 0 - Asymptomatic    Physical Exam  Constitutional:       Appearance: Normal appearance. He is well-developed.   HENT:      Head: Normocephalic and atraumatic.   Eyes:      Conjunctiva/sclera: Conjunctivae normal.      Pupils: Pupils are equal, round, and reactive to light.   Neck:      Musculoskeletal: Normal range of motion and neck supple.   Cardiovascular:      Rate and Rhythm: Normal rate and regular rhythm.      Heart sounds: No murmur. No friction rub.   Pulmonary:      Effort: Pulmonary effort is normal. No respiratory distress.      Breath sounds: Normal breath sounds.   Abdominal:      General: Bowel sounds are normal.      Palpations: Abdomen is soft.      Tenderness: There is no abdominal tenderness.   Musculoskeletal: Normal range of motion.         General: Tenderness (tenderness over R upper back) present. No swelling.   Lymphadenopathy:      Cervical: No cervical adenopathy.   Skin:     General: Skin is warm and dry.   Neurological:      General: No focal deficit present.      Mental Status: He is alert and oriented to person, place, and time.   Psychiatric:         Mood and Affect: Mood normal.         Behavior: Behavior normal.           Labs:   No results found for " this or any previous visit (from the past 48 hour(s)).     Imaging:  IR Biopsy Lymph Node  Narrative: EXAMINATION:  Image-guided biopsy    Procedural Personnel    Attending physician(s): Lisa    Fellow physician(s): None    Resident physician(s): None    Advanced practice provider(s): None    Pre-procedure diagnosis: Hypermetabolic supraclavicular lymph node    Post-procedure diagnosis: Same    Indication: Histopathologic diagnosis    Previous biopsy of same target (QCDR): No    Additional clinical history: None    Complications: No immediate complications.    TECHNIQUE:  - Percutaneous CT -guided coaxial core needle biopsy    FINDINGS:  Pre-procedure    Reference imaging for biopsy target: None    Consent: Informed consent for the procedure was obtained and time-out was performed prior to the procedure.    Preparation: The site was prepared and draped using maximal sterile barrier technique including cutaneous antisepsis.    Anesthesia/sedation    Level of anesthesia/sedation: Moderate sedation (conscious sedation)    Anesthesia/sedation administered by: Independent trained observer under attending supervision with continuous monitoring of the patient's level of consciousness and physiologic status    Total intra-service sedation time (minutes): 25    Biopsy    Local anesthesia was administered. Under imaging guidance as stated in the procedure summary, the biopsy needle was advanced to the target and biopsy was performed.    Coaxial needle: 17 gauge    Core needle biopsy device: [employer]    Core needle size: 18 gauge    Number of core specimens: 3    On-site biopsy touch preparation: Yes    Additional sampling recommendations: None    Preliminary assessment of sample adequacy: Adequate    Needle removal    The biopsy needle was removed and a sterile dressing was applied.    Tract embolization: None    Contrast    Contrast agent: None    Contrast volume (mL): 0    Radiation Dose    CT dose length product (  mGy-cm): 784.49    Additional Details    Additional description of procedure: None    Equipment details: None    Specimens removed: Biopsy samples as detailed above    Estimated blood loss (mL): Less than 10    Standardized report: [email or web address]    Attestation    Signer name: Davy gross    I attest that I was present for the entire procedure. I reviewed the stored images and agree with the report as written.  Impression: Image-guided biopsy of left supraclavicular lymph node.    Plan:    Specimen(s) sent for evaluation.    _______________________________________________________________    Electronically signed by: Davy Gonzalez  Date:    10/20/2020  Time:    17:32            Diagnoses:       1. Lung cancer, main bronchus, left    2. Secondary malignant neoplasm of bone    3. Examination of participant in clinical trial    4. Anemia, chronic disease          Assessment and Plan:         1,2,3. Stage IV NSCLC:   -Diagnosed Aug 2019, PDL1 5%, Guardant 360 without targetable  mutations  -ECOG 0  -Previously received carbo-taxol with RT (grade 3 anaphylaxis to taxol) Aug-Oct 2019 then Keytruda single agent until PET showed progression 9/10/20  - tissue sent for screening for lungMAP trial, allocated to the un-matched arm Ramucirumab + Keytruda vs. SOC. Awaiting randomization assignment.  Patient still without symptoms and good PS.    4 Anemia  Hgb now normal.         Greater than 50% of this time involved counseling or coordination of care. I have provided the patient with an opportunity to ask questions and have all questions answered to his satisfaction.     he will return to clinic per protocol, but knows to call in the interim if symptoms change or should a problem arise.    Jevon Key MD  Medical Oncology   [facility]  [facility]

## 2020-11-05 NOTE — PROGRESS NOTES
"Kashif Iverson  MRN # [id number]  [id number] trial  11/05/2020     Study ID# [id number]      [id number] Informed Consent Process:    We received email assigning pt to [id number] trial. Pt was notified and agreed to come in today to review ICF, as he would like to get tx started ASAP.     Patient and his wife came in this morning to discuss [id number] consent and begin screening procedures. He is in good spirits and reports he has been staying active.      Informed Consent was reviewed in full including: Overview and Key Information; Purpose; Length of Study and Number of Participants; Procedure; What are study groups?; Risks; Potential Benefits; Costs; Payment for Participation and/or Reimbursement of Expenses; Alternative Methods/Treatments; Study Related Questions and Compensation for Injury; Questions About Your Rights; Voluntary Participation and Withdrawal from Research; New Findings; Employees in Research; Confidentiality; Where can I get more information; Sections on optional studies; HIPAA Authorization to Release Information for Research; and section titled "specimen consent supplemental sheets".     Dr. Key was available for any questions patient may have. Opportunity for questions was given and all were answered to patient and wifes satisfaction. Informed consent was signed by patient and witnessed by myself. He was given a copy of signed informed consent, along with my contact information. Of note, he did consent "yes" to future contact and samples for unknown future studies.        Some of the screening procedures were completed today after ICF was signed, including screening H&P. He will be set up for the MRI and CT Scan by . I will f/u w/ pt and his wife once all testing has been completed. They know they can contact me w/ any issues, concerns, questions.    (Signed ICF is linked to this encounter)          "

## 2020-11-06 ENCOUNTER — HOSPITAL ENCOUNTER (OUTPATIENT)
Dept: RADIOLOGY | Facility: HOSPITAL | Age: 60
Discharge: HOME | End: 2020-11-06
Attending: INTERNAL MEDICINE
Payer: COMMERCIAL

## 2020-11-06 ENCOUNTER — DOCUMENTATION ONLY (OUTPATIENT)
Dept: HEMATOLOGY/ONCOLOGY | Facility: CLINIC | Age: 60
End: 2020-11-06

## 2020-11-06 DIAGNOSIS — C34.02 LUNG CANCER, MAIN BRONCHUS, LEFT: ICD-10-CM

## 2020-11-06 DIAGNOSIS — Z00.6 EXAMINATION OF PARTICIPANT IN CLINICAL TRIAL: ICD-10-CM

## 2020-11-06 PROCEDURE — 25500020 PHARM REV CODE 255: Performed by: INTERNAL MEDICINE

## 2020-11-06 PROCEDURE — 71260 CT THORAX DX C+: CPT | Mod: 26,,, | Performed by: RADIOLOGY

## 2020-11-06 PROCEDURE — 71260 CT CHEST ABDOMEN PELVIS WITH CONTRAST (XPD): ICD-10-PCS | Mod: 26,,, | Performed by: RADIOLOGY

## 2020-11-06 PROCEDURE — 74177 CT ABD & PELVIS W/CONTRAST: CPT | Mod: TC

## 2020-11-06 PROCEDURE — 74177 CT CHEST ABDOMEN PELVIS WITH CONTRAST (XPD): ICD-10-PCS | Mod: 26,,, | Performed by: RADIOLOGY

## 2020-11-06 PROCEDURE — 70553 MRI BRAIN W WO CONTRAST: ICD-10-PCS | Mod: 26,,, | Performed by: RADIOLOGY

## 2020-11-06 PROCEDURE — 70553 MRI BRAIN STEM W/O & W/DYE: CPT | Mod: TC

## 2020-11-06 PROCEDURE — A9585 GADOBUTROL INJECTION: HCPCS | Performed by: INTERNAL MEDICINE

## 2020-11-06 PROCEDURE — 74177 CT ABD & PELVIS W/CONTRAST: CPT | Mod: 26,,, | Performed by: RADIOLOGY

## 2020-11-06 PROCEDURE — 70553 MRI BRAIN STEM W/O & W/DYE: CPT | Mod: 26,,, | Performed by: RADIOLOGY

## 2020-11-06 RX ORDER — GADOBUTROL 604.72 MG/ML
10 INJECTION INTRAVENOUS
Status: COMPLETED | OUTPATIENT
Start: 2020-11-06 | End: 2020-11-06

## 2020-11-06 RX ADMIN — IOHEXOL 100 ML: 350 INJECTION, SOLUTION INTRAVENOUS at 05:11

## 2020-11-06 RX ADMIN — GADOBUTROL 10 ML: 604.72 INJECTION INTRAVENOUS at 02:11

## 2020-11-06 RX ADMIN — IOHEXOL 15 ML: 350 INJECTION, SOLUTION INTRAVENOUS at 03:11

## 2020-11-06 RX ADMIN — IOHEXOL 15 ML: 350 INJECTION, SOLUTION INTRAVENOUS at 04:11

## 2020-11-06 NOTE — PROGRESS NOTES
"Received consult from Jane Man RN, with [employer], requesting that I contact patient's wife re: insurance issues. Called patient's wife Natty Iverson ([phone]) to discuss their situation. Referring her to [facility] and  for further assistance. Provided her with the phone number for Timothy Ackerman,  in Cancer Services.    Sent email to Jax Arguello with [facility] Department and cc'ed Timothy on it:  "Can you contact patient Kashif Iverson's wife Natty Iverson at [phone]? She needs help in applying again for Medicaid for patient, and for herself too. They cannot continue to afford COBRA premiums. Patient and son under COBRA coverage through his former job before he was diagnosed with cancer last year. Wife was offered separate insurance COBRA coverage through her former job. Patient receives Social Security Disability income and she is receiving unemployment since losing her job during the COBRA pandemic. I think patient won't meet criteria for full Medicaid due to income amount and will have to apply for [place]. She applied for him and their son and son got approved right away but patient didn't; when she checked in with Medicaid she was told they sent a letter requesting medical bills but she said they never received the letter; the case was closed due to documents not being received. They will need to apply for [facility] Financial Assistance too; I gave her Timothy's phone number. He is now going to be Dr. Key's patient at [facility] and will be on a clinical trial."  Will continue to follow and assist as needs are identified.      "

## 2020-11-09 ENCOUNTER — RESEARCH ENCOUNTER (OUTPATIENT)
Dept: HEMATOLOGY/ONCOLOGY | Facility: CLINIC | Age: 60
End: 2020-11-09

## 2020-11-10 NOTE — PROGRESS NOTES
Kashif Iverson  MRN # [id number]  [id number] trial  11/09/2020     Study ID# [id number]     [id number]- ELIGIBILITY NOTE:     Pt satisfies all eligibility criteria as evidenced by:    Section 5.1-  [id number]  EGFR, ALK, ROS 1 gene and BRAF mutation are not reported on [facility] report.   Pt denies any active autoimmune diseases or history of primary immunodeficiency.  Criteria for 5.1, e-k and m-v were all reviewed w/ Dr Key and d/w pt. None of these items were noted in pts medical record and pt denies having any history of the medical conditions/ diseases listed.  5.1, l : pt does have a known reaction to the SOC drug Taxol, therefore Dr Key will only order the Gemcitabine if pt were to get randomized to the SOC ARM.     Section 5.2-  Pt meets prior/concurrent therapy criteria: pt was stage IV at diagnosis in 9/2019 and is s/p tx w/ Carbo/Taxol w/ concurrent palliative RT X 2, then 1 cycle of Carbo alone, d/t Taxol reaction. Pt was then tx on Pembro X 15 cycles. Pt was found to have progressive disease on PET during Pembro tx. Pembro was started on 10/31/19. Progressive disease occurred more than 84 days from initiation of anti-PD-1.  Section 5.2, b-i were reviewed and meet criteria.    Section 5.3-  Pt had screening CT Scan done 11/06/2020 and results were reviewed w/ Dr Key. Pt does have measurable disease. Per Dr Key, target lesions are: Lt Thyroid LN= 2.4cm; Subcarinal LN 1.6cm; Lt Lung Mass= 7.1cm.   MRI Brain done 11/06/2020 did not show any evidence of brain mets  Per Dr Key, pt can safely receive the SOC drug Gemcitabine should he be randomized to the SOC ARM.  Section 5.3, d-g were reviewed and all results are w/in acceptable eligibility parameters.  Pt does have a h/o HTN, however this had been controlled on med. Pts last BPs since 10/02/2020 are noted and meet criteria. Most recent /86.  Pts PT/INR & PTT were drawn on 11/05/2020 and are w/in  "study parameters.  Per Dr Key's pre-study H&P, done 11/05/20, pt is PS "0".    Stratification factors were reviewed w/ Dr Key:  a) PD-L1 status is negative  b) Pt has squamous cell lung ca  c) If pt is randomized to the SOC arm, planned treatment does NOT include Ramucirumab.    Eligibility reviewed by 2 Clinical Research RN's and Investigator. Dr Key deems pt appropriate to proceed on trial.   See protocol specific eligibility checklist attached to this encounter.  Pt will be randomized in OPEN. Pts wife was called and notified pt will be randomized tomorrow morning. Pts wife was informed I will update them once randomization occurs. She verbalized understanding.   "

## 2020-11-12 ENCOUNTER — RESEARCH ENCOUNTER (OUTPATIENT)
Dept: HEMATOLOGY/ONCOLOGY | Facility: CLINIC | Age: 60
End: 2020-11-12

## 2020-11-12 NOTE — PROGRESS NOTES
Kashif Iverson  MRN # [id number]  [id number] trial  11/09/2020     Study ID# [id number]     [id number]- RANDOMIZATION NOTE:      Randomization Note     Pt was randomized in OPEN and received ARM: B assignment. Pt's wife notified and was happy to get this news. Required ctDNA kits were ordered per protocol for pre-treatment time point. Dr Key's  notified and will schedule pt for next week. They voiced understanding and have no questions at this time.

## 2020-11-13 DIAGNOSIS — C34.02 LUNG CANCER, MAIN BRONCHUS, LEFT: Primary | ICD-10-CM

## 2020-11-13 DIAGNOSIS — Z00.6 EXAMINATION OF PARTICIPANT IN CLINICAL TRIAL: ICD-10-CM

## 2020-11-16 ENCOUNTER — CLINICAL SUPPORT (OUTPATIENT)
Dept: HEMATOLOGY/ONCOLOGY | Facility: CLINIC | Age: 60
End: 2020-11-16
Payer: COMMERCIAL

## 2020-11-16 DIAGNOSIS — Z13.9 SCREENING FOR CONDITION: Primary | ICD-10-CM

## 2020-11-16 LAB — SARS-COV-2 RDRP RESP QL NAA+PROBE: NEGATIVE

## 2020-11-16 PROCEDURE — U0002 COVID-19 LAB TEST NON-CDC: HCPCS

## 2020-11-17 ENCOUNTER — LAB VISIT (OUTPATIENT)
Dept: LAB | Facility: HOSPITAL | Age: 60
End: 2020-11-17
Payer: COMMERCIAL

## 2020-11-17 ENCOUNTER — RESEARCH ENCOUNTER (OUTPATIENT)
Dept: HEMATOLOGY/ONCOLOGY | Facility: CLINIC | Age: 60
End: 2020-11-17

## 2020-11-17 ENCOUNTER — OFFICE VISIT (OUTPATIENT)
Dept: HEMATOLOGY/ONCOLOGY | Facility: CLINIC | Age: 60
End: 2020-11-17
Payer: COMMERCIAL

## 2020-11-17 ENCOUNTER — INFUSION (OUTPATIENT)
Dept: INFUSION THERAPY | Facility: HOSPITAL | Age: 60
End: 2020-11-17
Attending: INTERNAL MEDICINE
Payer: COMMERCIAL

## 2020-11-17 VITALS
RESPIRATION RATE: 18 BRPM | TEMPERATURE: 98 F | DIASTOLIC BLOOD PRESSURE: 82 MMHG | WEIGHT: 242.88 LBS | SYSTOLIC BLOOD PRESSURE: 131 MMHG | OXYGEN SATURATION: 98 % | HEIGHT: 70 IN | BODY MASS INDEX: 34.77 KG/M2 | HEART RATE: 73 BPM

## 2020-11-17 VITALS
SYSTOLIC BLOOD PRESSURE: 121 MMHG | DIASTOLIC BLOOD PRESSURE: 69 MMHG | RESPIRATION RATE: 18 BRPM | TEMPERATURE: 99 F | HEART RATE: 67 BPM | OXYGEN SATURATION: 97 %

## 2020-11-17 DIAGNOSIS — Z00.6 EXAMINATION OF PARTICIPANT IN CLINICAL TRIAL: ICD-10-CM

## 2020-11-17 DIAGNOSIS — C79.51 SECONDARY MALIGNANT NEOPLASM OF BONE: ICD-10-CM

## 2020-11-17 DIAGNOSIS — C34.02 LUNG CANCER, MAIN BRONCHUS, LEFT: Primary | ICD-10-CM

## 2020-11-17 DIAGNOSIS — D63.8 ANEMIA, CHRONIC DISEASE: ICD-10-CM

## 2020-11-17 DIAGNOSIS — C79.51 SECONDARY MALIGNANT NEOPLASM OF BONE: Primary | ICD-10-CM

## 2020-11-17 DIAGNOSIS — C34.02 LUNG CANCER, MAIN BRONCHUS, LEFT: ICD-10-CM

## 2020-11-17 LAB
DRUG STUDY SPECIMEN TYPE: NORMAL
DRUG STUDY TEST NAME: NORMAL
DRUG STUDY TEST RESULT: NORMAL

## 2020-11-17 PROCEDURE — 1126F PR PAIN SEVERITY QUANTIFIED, NO PAIN PRESENT: ICD-10-PCS | Mod: S$GLB,,, | Performed by: INTERNAL MEDICINE

## 2020-11-17 PROCEDURE — 99215 PR OFFICE/OUTPT VISIT, EST, LEVL V, 40-54 MIN: ICD-10-PCS | Mod: S$GLB,,, | Performed by: INTERNAL MEDICINE

## 2020-11-17 PROCEDURE — 99000 SPECIMEN HANDLING OFFICE-LAB: CPT

## 2020-11-17 PROCEDURE — 3008F BODY MASS INDEX DOCD: CPT | Mod: CPTII,S$GLB,, | Performed by: INTERNAL MEDICINE

## 2020-11-17 PROCEDURE — 3008F PR BODY MASS INDEX (BMI) DOCUMENTED: ICD-10-PCS | Mod: CPTII,S$GLB,, | Performed by: INTERNAL MEDICINE

## 2020-11-17 PROCEDURE — 99215 OFFICE O/P EST HI 40 MIN: CPT | Mod: S$GLB,,, | Performed by: INTERNAL MEDICINE

## 2020-11-17 PROCEDURE — 99999 PR PBB SHADOW E&M-EST. PATIENT-LVL III: CPT | Mod: PBBFAC,,, | Performed by: INTERNAL MEDICINE

## 2020-11-17 PROCEDURE — 1126F AMNT PAIN NOTED NONE PRSNT: CPT | Mod: S$GLB,,, | Performed by: INTERNAL MEDICINE

## 2020-11-17 PROCEDURE — 63600175 PHARM REV CODE 636 W HCPCS: Performed by: INTERNAL MEDICINE

## 2020-11-17 PROCEDURE — 96417 CHEMO IV INFUS EACH ADDL SEQ: CPT

## 2020-11-17 PROCEDURE — 25000003 PHARM REV CODE 250: Performed by: INTERNAL MEDICINE

## 2020-11-17 PROCEDURE — 99999 PR PBB SHADOW E&M-EST. PATIENT-LVL III: ICD-10-PCS | Mod: PBBFAC,,, | Performed by: INTERNAL MEDICINE

## 2020-11-17 PROCEDURE — 36415 COLL VENOUS BLD VENIPUNCTURE: CPT

## 2020-11-17 PROCEDURE — 96413 CHEMO IV INFUSION 1 HR: CPT

## 2020-11-17 PROCEDURE — 96367 TX/PROPH/DG ADDL SEQ IV INF: CPT

## 2020-11-17 RX ORDER — HEPARIN 100 UNIT/ML
500 SYRINGE INTRAVENOUS
Status: DISCONTINUED | OUTPATIENT
Start: 2020-11-17 | End: 2020-11-17 | Stop reason: HOSPADM

## 2020-11-17 RX ORDER — SODIUM CHLORIDE 0.9 % (FLUSH) 0.9 %
10 SYRINGE (ML) INJECTION
Status: DISCONTINUED | OUTPATIENT
Start: 2020-11-17 | End: 2020-11-17 | Stop reason: HOSPADM

## 2020-11-17 RX ORDER — EPINEPHRINE 0.3 MG/.3ML
0.3 INJECTION SUBCUTANEOUS ONCE AS NEEDED
Status: CANCELLED | OUTPATIENT
Start: 2020-11-17

## 2020-11-17 RX ORDER — DIPHENHYDRAMINE HYDROCHLORIDE 50 MG/ML
50 INJECTION INTRAMUSCULAR; INTRAVENOUS ONCE AS NEEDED
Status: CANCELLED | OUTPATIENT
Start: 2020-11-17

## 2020-11-17 RX ORDER — SODIUM CHLORIDE 0.9 % (FLUSH) 0.9 %
10 SYRINGE (ML) INJECTION
Status: CANCELLED | OUTPATIENT
Start: 2020-11-17

## 2020-11-17 RX ORDER — EPINEPHRINE 0.3 MG/.3ML
0.3 INJECTION SUBCUTANEOUS ONCE AS NEEDED
Status: DISCONTINUED | OUTPATIENT
Start: 2020-11-17 | End: 2020-11-17 | Stop reason: HOSPADM

## 2020-11-17 RX ORDER — HEPARIN 100 UNIT/ML
500 SYRINGE INTRAVENOUS
Status: CANCELLED | OUTPATIENT
Start: 2020-11-17

## 2020-11-17 RX ORDER — DIPHENHYDRAMINE HYDROCHLORIDE 50 MG/ML
50 INJECTION INTRAMUSCULAR; INTRAVENOUS ONCE AS NEEDED
Status: DISCONTINUED | OUTPATIENT
Start: 2020-11-17 | End: 2020-11-17 | Stop reason: HOSPADM

## 2020-11-17 RX ADMIN — DIPHENHYDRAMINE HYDROCHLORIDE 50 MG: 50 INJECTION, SOLUTION INTRAMUSCULAR; INTRAVENOUS at 01:11

## 2020-11-17 RX ADMIN — SODIUM CHLORIDE 1102 MG: 0.9 INJECTION, SOLUTION INTRAVENOUS at 01:11

## 2020-11-17 RX ADMIN — SODIUM CHLORIDE: 9 INJECTION, SOLUTION INTRAVENOUS at 01:11

## 2020-11-17 NOTE — PLAN OF CARE
Problem: Adult Inpatient Plan of Care  Goal: Optimal Comfort and Wellbeing  Intervention: Provide Person-Centered Care  Flowsheets (Taken 11/17/2020 1526)  Trust Relationship/Rapport:   care explained   choices provided   reassurance provided   questions encouraged   emotional support provided   thoughts/feelings acknowledged   empathic listening provided   questions answered

## 2020-11-17 NOTE — PROGRESS NOTES
ONCOLOGY FOLLOW UP VISIT.     Reason for visit: Consent for clinical trial for stage IV NSCLC    Best Contact Phone Number(s): [phone] (home)      Cancer/Stage/TNM:   Cancer Staging  Lung cancer, main bronchus, left  Staging form: Lung, AJCC 8th Edition  - Clinical: Stage IVB (cT3, cN3, cM1c) - Signed by Jevon Key MD on 11/17/2020       Oncology History   Lung cancer, main bronchus, left   9/10/2019 Initial Diagnosis    Lung cancer, main bronchus, left     10/8/2019 - 10/28/2019 Chemotherapy    Treatment Summary   Plan Name: OP NSCLC PACLITAXEL + CARBOPLATIN (AUC) QW + RADIATION  Treatment Goal: Control  Status: Inactive  Start Date: 10/8/2019  End Date: 10/22/2019  Provider: Cricket Interiano MD  Chemotherapy: CARBOplatin (PARAPLATIN) 290 mg in sodium chloride 0.9% 250 mL chemo infusion, 290 mg (100 % of original dose 290 mg), Intravenous, Clinic/HOD 1 time, 3 of 6 cycles  Dose modification:   (original dose 290 mg, Cycle 1),   (original dose 300 mg, Cycle 2)  Administration: 290 mg (10/8/2019), 300 mg (10/15/2019)  PACLitaxel (TAXOL) 45 mg/m2 = 90 mg in sodium chloride 0.9% 250 mL chemo infusion, 45 mg/m2 = 90 mg, Intravenous, Clinic/HOD 1 time, 3 of 6 cycles  Administration: 90 mg (10/8/2019), 90 mg (10/15/2019), 90 mg (10/22/2019)     10/31/2019 - 9/10/2020 Chemotherapy    Treatment Summary   Plan Name: OP PEMBROLIZUMAB 200MG Q3W  Treatment Goal: Palliative  Status: Inactive  Start Date: 10/31/2019  End Date: 8/21/2020  Provider: Cricket Interiano MD  Chemotherapy: pembrolizumab (KEYTRUDA) 200 mg in sodium chloride 0.9% 108 mL chemo infusion, 200 mg, Intravenous, Clinic/HOD 1 time, 15 of 16 cycles  Administration: 200 mg (10/31/2019), 200 mg (11/21/2019), 200 mg (12/12/2019), 200 mg (1/2/2020), 200 mg (1/23/2020), 200 mg (2/14/2020), 200 mg (3/6/2020), 200 mg (3/27/2020), 200 mg (4/17/2020), 200 mg (5/8/2020), 200 mg (5/29/2020), 200 mg (6/19/2020), 200 mg (7/10/2020), 200 mg  (7/31/2020), 200 mg (8/21/2020)     11/17/2020 Cancer Staged    Staging form: Lung, AJCC 8th Edition  - Clinical: Stage IVB (cT3, cN3, cM1c)     11/17/2020 -  Chemotherapy    Treatment Summary   Plan Name: [facility] [id number] ARM B RAMUCIRUMAB PEMBROLIZUMAB Q3W  Treatment Goal: Control  Status: Active  Start Date: 11/17/2020  End Date: 11/1/2022 (Planned)  Provider: Jevon eKy MD  Chemotherapy: INV MK-3475 (pembrolizumab) 200 mg in INV sodium chloride 0.9 % 100 mL chemo infusion, 200 mg, Intravenous, Clinic/HOD 1 time, 1 of 35 cycles     Secondary malignant neoplasm of bone   9/24/2019 Initial Diagnosis    Secondary malignant neoplasm of bone     10/8/2019 - 10/28/2019 Chemotherapy    Treatment Summary   Plan Name: OP NSCLC PACLITAXEL + CARBOPLATIN (AUC) QW + RADIATION  Treatment Goal: Control  Status: Inactive  Start Date: 10/8/2019  End Date: 10/22/2019  Provider: Cricket Interiano MD  Chemotherapy: CARBOplatin (PARAPLATIN) 290 mg in sodium chloride 0.9% 250 mL chemo infusion, 290 mg (100 % of original dose 290 mg), Intravenous, Clinic/HOD 1 time, 3 of 6 cycles  Dose modification:   (original dose 290 mg, Cycle 1),   (original dose 300 mg, Cycle 2)  Administration: 290 mg (10/8/2019), 300 mg (10/15/2019)  PACLitaxel (TAXOL) 45 mg/m2 = 90 mg in sodium chloride 0.9% 250 mL chemo infusion, 45 mg/m2 = 90 mg, Intravenous, Clinic/HOD 1 time, 3 of 6 cycles  Administration: 90 mg (10/8/2019), 90 mg (10/15/2019), 90 mg (10/22/2019)     10/31/2019 - 9/10/2020 Chemotherapy    Treatment Summary   Plan Name: OP PEMBROLIZUMAB 200MG Q3W  Treatment Goal: Palliative  Status: Active  Start Date: 10/31/2019  End Date: 5/29/2020  Provider: Cricket Interiano MD  Chemotherapy: pembrolizumab (KEYTRUDA) 200 mg in sodium chloride 0.9% 108 mL chemo infusion, 200 mg, Intravenous, Clinic/HOD 1 time, 11 of 11 cycles  Administration: 200 mg (10/31/2019), 200 mg (11/21/2019), 200 mg (12/12/2019), 200 mg (1/2/2020), 200 mg  (1/23/2020), 200 mg (2/14/2020), 200 mg (3/6/2020), 200 mg (3/27/2020), 200 mg (4/17/2020), 200 mg (5/8/2020), 200 mg (5/29/2020)     11/17/2020 -  Chemotherapy    Treatment Summary   Plan Name: [facility] [id number] ARM B RAMUCIRUMAB PEMBROLIZUMAB Q3W  Treatment Goal: Control  Status: Active  Start Date: 11/17/2020  End Date: 11/1/2022 (Planned)  Provider: Jevon Key MD  Chemotherapy: INV MK-3475 (pembrolizumab) 200 mg in INV sodium chloride 0.9 % 100 mL chemo infusion, 200 mg, Intravenous, Clinic/HOD 1 time, 1 of 35 cycles          Interval History:   Today patient reports some rhinorrhea and PND that is green.  He denies fevers, chills, diarrhea, HA, vision changes, SOB, hemoptysis, cough.     Practical Problems Physical Problems   : (P) No Appearance: (P) No   Housing: (P) No Bathing / Dressing: (P) No   Insurance / Financial: (P) Yes Breathing: (P) No    Transportation: (P) No  Changes in Urination: (P) No    Work / School: (P) No  Constipation: (P) No   Treatment Decisions: (P) No  Diarrhea: (P) No     Eating: (P) No    Family Problems Fatigue: (P) No    Dealing with Children: (P) No Feeling Swollen: (P) No    Dealing with Partner: (P) No Fevers: (P) No    Ability to Have Children: (P) No  Getting Around: (P) No       Indigestion: (P) No     Memory / Concentration: (P) No   Emotional Problems Mouth Sores: (P) No    Depression: (P) No  Nausea: (P) No    Fears: (P) No  Nose Dry / Congested: (P) No    Nervousness: (P) Yes  Pain: (P) No    Sadness: (P) No Sexual: (P) No    Worry: (P) Yes Skin Dry / Itchy: (P) No    Loss of Interest in Usual Activities: (P) No Sleep: (P) No     Substance Abuse: (P) No    Spiritual/Religions Concerns Tingling in Hands / Feet: (P) No   Spritual / Islam Concerns: (P) No         Other Problems            ROS:  Review of Systems   Constitutional: Negative for chills, fever and weight loss.   HENT: Positive for congestion and sinus pain. Negative for hearing loss, nosebleeds  and sore throat.    Eyes: Negative for blurred vision and double vision.   Respiratory: Negative for cough, hemoptysis and shortness of breath.    Cardiovascular: Negative for chest pain and leg swelling.   Gastrointestinal: Negative for abdominal pain, blood in stool, diarrhea, nausea and vomiting.   Genitourinary: Negative for dysuria, frequency and hematuria.   Musculoskeletal: Negative for joint pain and myalgias.   Skin: Negative for itching and rash.   Neurological: Negative for dizziness, tingling, sensory change, speech change and headaches.   Endo/Heme/Allergies: Does not bruise/bleed easily.          A complete 12-point review of systems was reviewed and is negative except as mentioned above.     Past Medical History:   Past Medical History:   Diagnosis Date    Hypertension     Lung cancer     Lung mass     left lung        Allergies:   Review of patient's allergies indicates:  No Known Allergies     Medications:   Current Outpatient Medications   Medication Sig Dispense Refill    ascorbic acid-multivit-min (VITAMIN C ENERGY BOOSTER) 1,000 mg PwEP Take by mouth. Patient takes 3,000 mg per day      atenoloL (TENORMIN) 25 MG tablet Take 1 tablet (25 mg total) by mouth once daily. 30 tablet 11     No current facility-administered medications for this visit.      Facility-Administered Medications Ordered in Other Visits   Medication Dose Route Frequency Provider Last Rate Last Dose    alteplase injection 2 mg  2 mg Intra-Catheter PRN Jevon Key MD        diphenhydramine (BENADRYL) 50 mg in NS 50 mL IVPB  50 mg Intravenous 1 time in Clinic/HOD Jevon Key  mL/hr at 11/17/20 1311 50 mg at 11/17/20 1311    diphenhydrAMINE injection 50 mg  50 mg Intravenous Once PRN Jevon Key MD        EPINEPHrine (EPIPEN) 0.3 mg/0.3 mL pen injection 0.3 mg  0.3 mg Intramuscular Once PRN Jevon Key MD        heparin, porcine (PF) 100 unit/mL injection flush 500 Units  500 Units  "Intravenous PRN Jevon Key MD        INV MK-3475 (pembrolizumab) 200 mg in INV sodium chloride 0.9 % 100 mL chemo infusion  200 mg Intravenous 1 time in Clinic/HOD Jevon Key MD        INV RAMUCIRUMAB (INV [id number]) 1,102 mg in sodium chloride 0.9% 250 mL chemo infusion  10 mg/kg (Treatment Plan Recorded) Intravenous 1 time in Clinic/HOD Jevon Key MD        sodium chloride 0.9% 250 mL flush bag   Intravenous PRN Jevon Key MD        sodium chloride 0.9% flush 10 mL  10 mL Intravenous PRN Jevon Key MD            Physical Exam:   /82 (BP Location: Left arm, Patient Position: Sitting, BP Method: Large (Automatic))   Pulse 73   Temp 98.4 °F (36.9 °C) (Oral)   Resp 18   Ht 5' 10" (1.778 m)   Wt 110.2 kg (242 lb 14.4 oz)   SpO2 98%   BMI 34.85 kg/m²      ECOG Performance Status: (foot note - ECOG PS provided by Eastern Cooperative Oncology Group) 0 - Asymptomatic    Physical Exam   Constitutional: He is oriented to person, place, and time and well-developed, well-nourished, and in no distress. No distress.   HENT:   Head: Normocephalic and atraumatic.   Eyes: Conjunctivae and EOM are normal.   Neck: Normal range of motion. Neck supple.   Cardiovascular: Normal rate and regular rhythm. Exam reveals no gallop and no friction rub.   No murmur heard.  Pulmonary/Chest: Effort normal. No respiratory distress.   Abdominal: Soft. He exhibits no distension. There is no abdominal tenderness.   Musculoskeletal: Normal range of motion.         General: No edema.   Neurological: He is alert and oriented to person, place, and time.   Skin: Skin is warm and dry. No rash noted.   Psychiatric: Affect and judgment normal.             Labs:   Recent Results (from the past 48 hour(s))   COVID-19 Rapid Screening    Collection Time: 11/16/20 10:56 AM   Result Value Ref Range    SARS-CoV-2 RNA, Amplification, Qual Negative Negative   DRUG STUDY SENDOUT, [facility]    Collection Time: " 11/17/20 10:32 AM   Result Value Ref Range    Drug Study Test Name Drug Study     Drug Study Specimen Type BLOOD     Drug Study Test Result Result sent directly to physician         Imaging:  CT Chest Abdomen Pelvis With Contrast  Narrative: EXAMINATION:  CT CHEST ABDOMEN PELVIS WITH CONTRAST (XPD)    CLINICAL HISTORY:  baseline N5630M/ screening; Malignant neoplasm of left main bronchus    TECHNIQUE:  Low dose axial images, sagittal and coronal reformations were obtained from the thoracic inlet to the pubic symphysis following the IV administration of 100 mL of Omnipaque 350 and the oral administration of 30 ml of Omnipaque 350.    COMPARISON:  PET-CT 09/10/2020. PET-CT 09/19/2019.  CTA chest abdomen pelvis 08/22/2020.    FINDINGS:  Chest:    Base of the neck: Several soft tissue lesions are present along the left side of the thyroid that demonstrate hypermetabolic activity on prior FDG PET scan consistent with enlarged lymph nodes.  Allowing for variation in technique, these appear similar in size to most recent comparison PET CT.  For reference, largest measures 2.4 cm short axis (axial series 2, image 17).    Heart and great vessels: Heart is normal in size without anterior pericardial fluid.  Trace fluid tracking within the pericardial recesses.    Adenopathy: 1.4 cm short axis right paratracheal lymph node (axial series 2, image 26) with previous corresponding PET tracer avidity.  1.6 cm short axis subcarinal lymph node (axial series 2, image 54) with previous corresponding PET tracer avidity.  No axillary lymphadenopathy.    Esophagus: Within normal limits.    Lungs/airways: Relatively decreased volume of the left upper lobe.  Persistent irregular soft tissue mass in the anterior aspect of the left upper lobe abutting the mediastinal and anterior pleura measuring up to 5.0 x 7.1 x 5.7 cm (AP x TV x CC).  Mass extends towards the mediastinum and partially surrounds the left pulmonary artery without narrowing.   There is adjacent pleuroparenchymal thickening and distortion surrounding bandlike densities, likely related to component of scarring and sequela of prior radiation.  Allowing for variation in technique, overall size and extent of this mass is similar to PET-CT 09/10/2020 and is significantly improved compared to initial CTA 08/22/2019.  Stable 0.5 cm nodule in the lateral basal segment left lower lobe (axial series 2, image 72).  No new mass.    Stable atelectatic changes and mild posterior pleural thickening in the left lower lobe.  Centrilobular emphysematous changes with an upper lobe predominance.    Abdomen:    Liver: Normal in size and attenuation.  No focal lesion.  Portal vein is patent.    Gallbladder: Within normal limits.    Biliary system: Non-dilated.    Spleen: Within normal limits.    Pancreas: No mass or peripancreatic fat stranding.  There are few scattered calcifications about the pancreatic head that may represent sequela of remote pancreatitis.    Adrenals: Left adrenal gland is normal.  Stable 0.9 cm right adrenal gland nodule too small to characterize though noncontrast Hounsfield attenuation on prior PET suggestive of adenoma.    Kidneys: Normal in size and position. No focal renal abnormality. No nephrolithiasis or hydronephrosis. Ureters are non-dilated.    Bowel: Within normal limits.  No evidence of obstruction.  Numerous colonic diverticula without evidence of diverticulitis.  Appendix normal.    Peritoneum: No ascites or pneumoperitoneum.    Abdominal Adenopathy: There few prominent lymph nodes in the tete hepatis and around the pancreatic head that are relatively unchanged since several prior exams and did not demonstrate hypermetabolic activity on prior PET-CT.  For example there is a 1.1 cm periportal lymph node (coronal series 601, image 65).  No new lymphadenopathy.    Vasculature: No aneurysm.  Scattered calcified noncalcified atherosclerotic plaque throughout the abdominal aorta  and major branch vessels.  More prominent area of noncalcified plaque versus mural thrombus in the distal aorta just proximal to the bifurcation, unchanged since CTA 08/22/2019.    Pelvis:    Urinary bladder: Unremarkable.    Male reproductive: Dystrophic calcification the prostate    Pelvis adenopathy: None.    Bones: Multilevel degenerative changes of the spine without acute fracture or destructive process.  Healed right posterior rib fracture.    Miscellaneous: None.  Impression: In this patient with known left lung squamous cell carcinoma and metastatic adenopathy, there is similar appearance of a left upper lobe primary mass with associated volume loss and surrounding fibrosis consistent with post radiation change.  Stable left lower lobe subcentimeter nodule.  No new pulmonary mass on today's exam.    Similar size of previous PET avid nodes along the left aspect of the thyroid, right paratracheal, and subcarinal regions.    Few prominent abdominal lymph nodes, predominantly in the tete hepatis and peripancreatic regions, unchanged since several prior examinations.    Additional stable incidental findings, as above.    Electronically signed by resident: Jevon Domingo  Date:    11/06/2020  Time:    19:26    Electronically signed by: Jamar Horner  Date:    11/07/2020  Time:    07:48            Diagnoses:       1. Lung cancer, main bronchus, left    2. Secondary malignant neoplasm of bone    3. Examination of participant in clinical trial    4. Anemia, chronic disease          Assessment and Plan:         1,2,3. Stage IV NSCLC:   -Diagnosed Aug 2019, PDL1 5%, Guardant 360 without targetable  mutations  -ECOG 0  -Previously received carbo-taxol with RT (grade 3 anaphylaxis to taxol) Aug-Oct 2019 then Keytruda single agent until PET showed progression 9/10/20  - tissue sent for screening for lungMAP trial, allocated to the un-matched arm Ramucirumab + Keytruda vs. SOC. He has been randomized the  experimental arm Ramucirumab + Keytruda. Patient still without symptoms and good PS.    4 Anemia  Hgb now normal.         Greater than 50% of this time involved counseling or coordination of care. I have provided the patient with an opportunity to ask questions and have all questions answered to his satisfaction.     he will return to clinic per protocol, but knows to call in the interim if symptoms change or should a problem arise.    Jevon Key MD  Medical Oncology   [facility]  [facility]

## 2020-11-17 NOTE — PLAN OF CARE
Pt tolerated C1D1 Q9521A today. Pt had hour observation in between Ramucirumab and Pembrolizumab infusion. VSS. Pt stayed hour post Pembro infusion observation VSS, NAD. PIV flushed and removed.AVS given to pt.  Discharged home. Ambulated independently with wife.

## 2020-11-17 NOTE — PROGRESS NOTES
"  Kashif Iverson  MRN # [id number]  [id number] trial  11/17/2020     Study ID# [id number]  [id number]     Randomized 11/10/2020 to ARM B: Ramucirumab + Pembrolizumab Q3W      Cycle 1/ Day 1    Pt arrived to clinic this morning for appts with lab, MD and chemo infusion. Pt was accompanied byt his wife and was in a pleasant mood. He is "ready to start treatment". Pt voiced his willingness to participate in trial.    Baseline AE's reviewed:  Pt denies any fever, SOB, difficulty breathing, chest pain, or headaches. He states his activity level has been good and he is eating well. Pt's wife also confirms this.     Hypertension, GR2 (intermittent)  - Pt has a h/o HTN; recent recordings noted in Epic have been GR1. Pt's BP has been controlled and he voices compliance w/ his Atenolol.  BP today was 131/82.  Allergic rhinitis, GR1- pt reports recent issues w/ nasal discharge and congestion. Dr Key aware, NCS, no new orders given. Will cont to monitor.      Pt was seen and assessed per Dr Key; H&P done. Pt's treatment plan on ARM B of trial was reviewed again w/ pt and his wife. Their questions were answered per Dr Key.    VS stable: /82, P 73, T 98.4  WT: 110.2 kg (242.9 pds)  PS: 0    Since screening labs were drawn on 11/05/2020 they did not need to be redrawn, per protocol section 9.2. All results from screening labs were reviewed and deemed satisfactory to proceed w/ treatment today.   Pt did have whole blood drawn this morning for ctDNA, as well as buffy coat and plasma; ctDNA specimens were logged in [facility], packing list was printed and will be sent out today w/ specimens today. Buffy coat and plasma were frozen and will be sent out w/ batch shipment.    Treatment plan was entered into [employer] and reviewed w/ Dr Key. Ramucirumab dose was calculated based off os today's wt. Orders were signed by Dr Key.     Chemotherapy infusion nurse Gloria Cummings, RN educated on the order and duration of drug administration, 1 hour " "observation time post Cyramza, and 1 hour observation time post Keytruda . Infusion guidelines handout provided and instructed to call me for any infusion issues. She verbalized understanding.     Regarding urine specimens: section 9.2 calendar of study events was reviewed w/ Dr Key, specifically footnote # 10 which states "urine protein should be performed throughout treatment as clinically indicated". Per Dr Key, this is not clinically indicated at this time for pt. Dr Key will order urine protein at his discretion if clinically indicated.     Pt has appt to return to clinic in 3 weeks for Cycle 2. They are aware of these appts. Pt has my contact information and was instructed to call us for any interim issues, concerns or questions they have between today and his next appt. They verbalized understanding.     "

## 2020-11-18 ENCOUNTER — DOCUMENTATION ONLY (OUTPATIENT)
Dept: HEMATOLOGY/ONCOLOGY | Facility: CLINIC | Age: 60
End: 2020-11-18

## 2020-11-18 DIAGNOSIS — Z00.6 EXAMINATION OF PARTICIPANT IN CLINICAL TRIAL: ICD-10-CM

## 2020-11-18 DIAGNOSIS — C34.02 LUNG CANCER, MAIN BRONCHUS, LEFT: Primary | ICD-10-CM

## 2020-11-18 NOTE — PROGRESS NOTES
Informed per Jane Man RN, that patient's wife mentioned to her that she had not received a call yet from [facility] Sent additional email message to Jax Arguello, Supervisor with the [facility] Dept[facility]  this morning requesting that he/staff contact patient's wife for help with his Medicaid application. Will continue to follow and assist as needs are identified.

## 2020-11-21 ENCOUNTER — PATIENT MESSAGE (OUTPATIENT)
Dept: ADMINISTRATIVE | Facility: OTHER | Age: 60
End: 2020-11-21

## 2020-11-24 ENCOUNTER — PATIENT MESSAGE (OUTPATIENT)
Dept: ADMINISTRATIVE | Facility: OTHER | Age: 60
End: 2020-11-24

## 2020-11-25 ENCOUNTER — PATIENT MESSAGE (OUTPATIENT)
Dept: ADMINISTRATIVE | Facility: OTHER | Age: 60
End: 2020-11-25

## 2020-11-26 ENCOUNTER — PATIENT MESSAGE (OUTPATIENT)
Dept: ADMINISTRATIVE | Facility: OTHER | Age: 60
End: 2020-11-26

## 2020-11-27 ENCOUNTER — PATIENT MESSAGE (OUTPATIENT)
Dept: ADMINISTRATIVE | Facility: OTHER | Age: 60
End: 2020-11-27

## 2020-11-28 ENCOUNTER — PATIENT MESSAGE (OUTPATIENT)
Dept: ADMINISTRATIVE | Facility: OTHER | Age: 60
End: 2020-11-28

## 2020-11-29 ENCOUNTER — PATIENT MESSAGE (OUTPATIENT)
Dept: ADMINISTRATIVE | Facility: OTHER | Age: 60
End: 2020-11-29

## 2020-11-30 ENCOUNTER — PATIENT MESSAGE (OUTPATIENT)
Dept: ADMINISTRATIVE | Facility: OTHER | Age: 60
End: 2020-11-30

## 2020-12-01 ENCOUNTER — PATIENT MESSAGE (OUTPATIENT)
Dept: ADMINISTRATIVE | Facility: OTHER | Age: 60
End: 2020-12-01

## 2020-12-02 ENCOUNTER — PATIENT MESSAGE (OUTPATIENT)
Dept: ADMINISTRATIVE | Facility: OTHER | Age: 60
End: 2020-12-02

## 2020-12-02 NOTE — PLAN OF CARE
Pt presented for Cycle 1 Taxol/Carbo. Stat labs drawn upon arrival. Labs WNL for treatment. Consent in media. Pt reported fatigue and chronic low back pain. Premedicated with IVPB Decadron/Aloxi, IVP Pepcid, and IVPB Benadryl. Tolerated treatment without issue. VSS throughout treatment. Distress screening tool completed. Ambulated independently into and out of unit. Accompanied by wife. Lunch provided. AVS provided and reviewed with pt.    Yes

## 2020-12-03 ENCOUNTER — PATIENT MESSAGE (OUTPATIENT)
Dept: ADMINISTRATIVE | Facility: OTHER | Age: 60
End: 2020-12-03

## 2020-12-04 ENCOUNTER — PATIENT MESSAGE (OUTPATIENT)
Dept: ADMINISTRATIVE | Facility: OTHER | Age: 60
End: 2020-12-04

## 2020-12-05 ENCOUNTER — PATIENT MESSAGE (OUTPATIENT)
Dept: ADMINISTRATIVE | Facility: OTHER | Age: 60
End: 2020-12-05

## 2020-12-06 ENCOUNTER — PATIENT MESSAGE (OUTPATIENT)
Dept: ADMINISTRATIVE | Facility: OTHER | Age: 60
End: 2020-12-06

## 2020-12-07 ENCOUNTER — LAB VISIT (OUTPATIENT)
Dept: LAB | Facility: HOSPITAL | Age: 60
End: 2020-12-07
Attending: INTERNAL MEDICINE
Payer: COMMERCIAL

## 2020-12-07 ENCOUNTER — PATIENT MESSAGE (OUTPATIENT)
Dept: ADMINISTRATIVE | Facility: OTHER | Age: 60
End: 2020-12-07

## 2020-12-07 DIAGNOSIS — C34.02 LUNG CANCER, MAIN BRONCHUS, LEFT: ICD-10-CM

## 2020-12-07 DIAGNOSIS — Z00.6 EXAMINATION OF PARTICIPANT IN CLINICAL TRIAL: ICD-10-CM

## 2020-12-07 LAB
ALBUMIN SERPL BCP-MCNC: 3.7 G/DL (ref 3.5–5.2)
ALP SERPL-CCNC: 67 U/L (ref 55–135)
ALT SERPL W/O P-5'-P-CCNC: 21 U/L (ref 10–44)
ANION GAP SERPL CALC-SCNC: 10 MMOL/L (ref 8–16)
AST SERPL-CCNC: 16 U/L (ref 10–40)
BASOPHILS # BLD AUTO: 0.1 K/UL (ref 0–0.2)
BASOPHILS NFR BLD: 1 % (ref 0–1.9)
BILIRUB SERPL-MCNC: 0.3 MG/DL (ref 0.1–1)
BUN SERPL-MCNC: 12 MG/DL (ref 6–20)
CALCIUM SERPL-MCNC: 8.8 MG/DL (ref 8.7–10.5)
CHLORIDE SERPL-SCNC: 103 MMOL/L (ref 95–110)
CO2 SERPL-SCNC: 28 MMOL/L (ref 23–29)
CREAT SERPL-MCNC: 0.8 MG/DL (ref 0.5–1.4)
DIFFERENTIAL METHOD: NORMAL
EOSINOPHIL # BLD AUTO: 0.4 K/UL (ref 0–0.5)
EOSINOPHIL NFR BLD: 4.1 % (ref 0–8)
ERYTHROCYTE [DISTWIDTH] IN BLOOD BY AUTOMATED COUNT: 13.2 % (ref 11.5–14.5)
EST. GFR  (AFRICAN AMERICAN): >60 ML/MIN/1.73 M^2
EST. GFR  (NON AFRICAN AMERICAN): >60 ML/MIN/1.73 M^2
GLUCOSE SERPL-MCNC: 140 MG/DL (ref 70–110)
HCT VFR BLD AUTO: 48.7 % (ref 40–54)
HGB BLD-MCNC: 15.8 G/DL (ref 14–18)
IMM GRANULOCYTES # BLD AUTO: 0.04 K/UL (ref 0–0.04)
IMM GRANULOCYTES NFR BLD AUTO: 0.4 % (ref 0–0.5)
LYMPHOCYTES # BLD AUTO: 2.3 K/UL (ref 1–4.8)
LYMPHOCYTES NFR BLD: 22.9 % (ref 18–48)
MCH RBC QN AUTO: 30.6 PG (ref 27–31)
MCHC RBC AUTO-ENTMCNC: 32.4 G/DL (ref 32–36)
MCV RBC AUTO: 94 FL (ref 82–98)
MONOCYTES # BLD AUTO: 1 K/UL (ref 0.3–1)
MONOCYTES NFR BLD: 9.7 % (ref 4–15)
NEUTROPHILS # BLD AUTO: 6.3 K/UL (ref 1.8–7.7)
NEUTROPHILS NFR BLD: 61.9 % (ref 38–73)
NRBC BLD-RTO: 0 /100 WBC
PLATELET # BLD AUTO: 240 K/UL (ref 150–350)
PMV BLD AUTO: 9.7 FL (ref 9.2–12.9)
POTASSIUM SERPL-SCNC: 4.2 MMOL/L (ref 3.5–5.1)
PROT SERPL-MCNC: 7.6 G/DL (ref 6–8.4)
RBC # BLD AUTO: 5.17 M/UL (ref 4.6–6.2)
SODIUM SERPL-SCNC: 141 MMOL/L (ref 136–145)
WBC # BLD AUTO: 10.23 K/UL (ref 3.9–12.7)

## 2020-12-07 PROCEDURE — 36415 COLL VENOUS BLD VENIPUNCTURE: CPT | Mod: PO

## 2020-12-07 PROCEDURE — 85025 COMPLETE CBC W/AUTO DIFF WBC: CPT

## 2020-12-07 PROCEDURE — 80053 COMPREHEN METABOLIC PANEL: CPT

## 2020-12-08 ENCOUNTER — LAB VISIT (OUTPATIENT)
Dept: LAB | Facility: HOSPITAL | Age: 60
End: 2020-12-08
Payer: COMMERCIAL

## 2020-12-08 ENCOUNTER — PATIENT MESSAGE (OUTPATIENT)
Dept: ADMINISTRATIVE | Facility: OTHER | Age: 60
End: 2020-12-08

## 2020-12-08 ENCOUNTER — INFUSION (OUTPATIENT)
Dept: INFUSION THERAPY | Facility: HOSPITAL | Age: 60
End: 2020-12-08
Attending: INTERNAL MEDICINE
Payer: COMMERCIAL

## 2020-12-08 ENCOUNTER — OFFICE VISIT (OUTPATIENT)
Dept: HEMATOLOGY/ONCOLOGY | Facility: CLINIC | Age: 60
End: 2020-12-08
Payer: COMMERCIAL

## 2020-12-08 VITALS
BODY MASS INDEX: 35.42 KG/M2 | HEIGHT: 70 IN | OXYGEN SATURATION: 96 % | TEMPERATURE: 98 F | WEIGHT: 247.38 LBS | DIASTOLIC BLOOD PRESSURE: 91 MMHG | HEART RATE: 74 BPM | SYSTOLIC BLOOD PRESSURE: 164 MMHG | RESPIRATION RATE: 18 BRPM

## 2020-12-08 VITALS
DIASTOLIC BLOOD PRESSURE: 83 MMHG | OXYGEN SATURATION: 97 % | RESPIRATION RATE: 18 BRPM | TEMPERATURE: 98 F | SYSTOLIC BLOOD PRESSURE: 142 MMHG | HEART RATE: 68 BPM

## 2020-12-08 DIAGNOSIS — Z00.6 EXAMINATION OF PARTICIPANT IN CLINICAL TRIAL: ICD-10-CM

## 2020-12-08 DIAGNOSIS — C34.02 LUNG CANCER, MAIN BRONCHUS, LEFT: Primary | ICD-10-CM

## 2020-12-08 DIAGNOSIS — C34.02 LUNG CANCER, MAIN BRONCHUS, LEFT: ICD-10-CM

## 2020-12-08 DIAGNOSIS — C79.51 SECONDARY MALIGNANT NEOPLASM OF BONE: ICD-10-CM

## 2020-12-08 DIAGNOSIS — C79.51 SECONDARY MALIGNANT NEOPLASM OF BONE: Primary | ICD-10-CM

## 2020-12-08 PROCEDURE — 96367 TX/PROPH/DG ADDL SEQ IV INF: CPT

## 2020-12-08 PROCEDURE — 99214 PR OFFICE/OUTPT VISIT, EST, LEVL IV, 30-39 MIN: ICD-10-PCS | Mod: S$GLB,,, | Performed by: INTERNAL MEDICINE

## 2020-12-08 PROCEDURE — 99999 PR PBB SHADOW E&M-EST. PATIENT-LVL III: ICD-10-PCS | Mod: PBBFAC,,, | Performed by: INTERNAL MEDICINE

## 2020-12-08 PROCEDURE — 25000003 PHARM REV CODE 250: Performed by: INTERNAL MEDICINE

## 2020-12-08 PROCEDURE — 1126F AMNT PAIN NOTED NONE PRSNT: CPT | Mod: S$GLB,,, | Performed by: INTERNAL MEDICINE

## 2020-12-08 PROCEDURE — 99999 PR PBB SHADOW E&M-EST. PATIENT-LVL III: CPT | Mod: PBBFAC,,, | Performed by: INTERNAL MEDICINE

## 2020-12-08 PROCEDURE — 96417 CHEMO IV INFUS EACH ADDL SEQ: CPT

## 2020-12-08 PROCEDURE — 3008F PR BODY MASS INDEX (BMI) DOCUMENTED: ICD-10-PCS | Mod: CPTII,S$GLB,, | Performed by: INTERNAL MEDICINE

## 2020-12-08 PROCEDURE — 99214 OFFICE O/P EST MOD 30 MIN: CPT | Mod: S$GLB,,, | Performed by: INTERNAL MEDICINE

## 2020-12-08 PROCEDURE — 1126F PR PAIN SEVERITY QUANTIFIED, NO PAIN PRESENT: ICD-10-PCS | Mod: S$GLB,,, | Performed by: INTERNAL MEDICINE

## 2020-12-08 PROCEDURE — 3008F BODY MASS INDEX DOCD: CPT | Mod: CPTII,S$GLB,, | Performed by: INTERNAL MEDICINE

## 2020-12-08 PROCEDURE — 96413 CHEMO IV INFUSION 1 HR: CPT

## 2020-12-08 PROCEDURE — 63600175 PHARM REV CODE 636 W HCPCS: Performed by: INTERNAL MEDICINE

## 2020-12-08 RX ORDER — EPINEPHRINE 0.3 MG/.3ML
0.3 INJECTION SUBCUTANEOUS ONCE AS NEEDED
Status: CANCELLED | OUTPATIENT
Start: 2020-12-08

## 2020-12-08 RX ORDER — SODIUM CHLORIDE 0.9 % (FLUSH) 0.9 %
10 SYRINGE (ML) INJECTION
Status: CANCELLED | OUTPATIENT
Start: 2020-12-08

## 2020-12-08 RX ORDER — SODIUM CHLORIDE 0.9 % (FLUSH) 0.9 %
10 SYRINGE (ML) INJECTION
Status: DISCONTINUED | OUTPATIENT
Start: 2020-12-08 | End: 2020-12-08 | Stop reason: HOSPADM

## 2020-12-08 RX ORDER — EPINEPHRINE 0.3 MG/.3ML
0.3 INJECTION SUBCUTANEOUS ONCE AS NEEDED
Status: DISCONTINUED | OUTPATIENT
Start: 2020-12-08 | End: 2020-12-08 | Stop reason: HOSPADM

## 2020-12-08 RX ORDER — HEPARIN 100 UNIT/ML
500 SYRINGE INTRAVENOUS
Status: CANCELLED | OUTPATIENT
Start: 2020-12-08

## 2020-12-08 RX ORDER — DIPHENHYDRAMINE HYDROCHLORIDE 50 MG/ML
50 INJECTION INTRAMUSCULAR; INTRAVENOUS ONCE AS NEEDED
Status: DISCONTINUED | OUTPATIENT
Start: 2020-12-08 | End: 2020-12-08 | Stop reason: HOSPADM

## 2020-12-08 RX ORDER — DIPHENHYDRAMINE HYDROCHLORIDE 50 MG/ML
50 INJECTION INTRAMUSCULAR; INTRAVENOUS ONCE AS NEEDED
Status: CANCELLED | OUTPATIENT
Start: 2020-12-08

## 2020-12-08 RX ORDER — HEPARIN 100 UNIT/ML
500 SYRINGE INTRAVENOUS
Status: DISCONTINUED | OUTPATIENT
Start: 2020-12-08 | End: 2020-12-08 | Stop reason: HOSPADM

## 2020-12-08 RX ADMIN — SODIUM CHLORIDE: 0.9 INJECTION, SOLUTION INTRAVENOUS at 12:12

## 2020-12-08 RX ADMIN — SODIUM CHLORIDE 1122 MG: 0.9 INJECTION, SOLUTION INTRAVENOUS at 12:12

## 2020-12-08 RX ADMIN — DIPHENHYDRAMINE HYDROCHLORIDE 50 MG: 50 INJECTION, SOLUTION INTRAMUSCULAR; INTRAVENOUS at 11:12

## 2020-12-08 NOTE — PROGRESS NOTES
ONCOLOGY FOLLOW UP VISIT.     Reason for visit: Consent for clinical trial for stage IV NSCLC    Best Contact Phone Number(s): [phone] (home)      Cancer/Stage/TNM:   Cancer Staging  Lung cancer, main bronchus, left  Staging form: Lung, AJCC 8th Edition  - Clinical: Stage IVB (cT3, cN3, cM1c) - Signed by Jevon Key MD on 11/17/2020       Oncology History   Lung cancer, main bronchus, left   9/10/2019 Initial Diagnosis    Lung cancer, main bronchus, left     10/8/2019 - 10/28/2019 Chemotherapy    Treatment Summary   Plan Name: OP NSCLC PACLITAXEL + CARBOPLATIN (AUC) QW + RADIATION  Treatment Goal: Control  Status: Inactive  Start Date: 10/8/2019  End Date: 10/22/2019  Provider: Cricket Interiano MD  Chemotherapy: CARBOplatin (PARAPLATIN) 290 mg in sodium chloride 0.9% 250 mL chemo infusion, 290 mg (100 % of original dose 290 mg), Intravenous, Clinic/HOD 1 time, 3 of 6 cycles  Dose modification:   (original dose 290 mg, Cycle 1),   (original dose 300 mg, Cycle 2)  Administration: 290 mg (10/8/2019), 300 mg (10/15/2019)  PACLitaxel (TAXOL) 45 mg/m2 = 90 mg in sodium chloride 0.9% 250 mL chemo infusion, 45 mg/m2 = 90 mg, Intravenous, Clinic/HOD 1 time, 3 of 6 cycles  Administration: 90 mg (10/8/2019), 90 mg (10/15/2019), 90 mg (10/22/2019)     10/31/2019 - 9/10/2020 Chemotherapy    Treatment Summary   Plan Name: OP PEMBROLIZUMAB 200MG Q3W  Treatment Goal: Palliative  Status: Inactive  Start Date: 10/31/2019  End Date: 8/21/2020  Provider: Cricket Interiano MD  Chemotherapy: pembrolizumab (KEYTRUDA) 200 mg in sodium chloride 0.9% 108 mL chemo infusion, 200 mg, Intravenous, Clinic/HOD 1 time, 15 of 16 cycles  Administration: 200 mg (10/31/2019), 200 mg (11/21/2019), 200 mg (12/12/2019), 200 mg (1/2/2020), 200 mg (1/23/2020), 200 mg (2/14/2020), 200 mg (3/6/2020), 200 mg (3/27/2020), 200 mg (4/17/2020), 200 mg (5/8/2020), 200 mg (5/29/2020), 200 mg (6/19/2020), 200 mg (7/10/2020), 200 mg  (7/31/2020), 200 mg (8/21/2020)     11/17/2020 Cancer Staged    Staging form: Lung, AJCC 8th Edition  - Clinical: Stage IVB (cT3, cN3, cM1c)     11/17/2020 -  Chemotherapy    Treatment Summary   Plan Name: [facility] [id number] ARM B RAMUCIRUMAB PEMBROLIZUMAB Q3W  Treatment Goal: Control  Status: Active  Start Date: 11/17/2020  End Date: 11/1/2022 (Planned)  Provider: Jevon Key MD  Chemotherapy: INV [id number] (pembrolizumab) 200 mg in INV sodium chloride 0.9 % 100 mL chemo infusion, 200 mg, Intravenous, Clinic/HOD 1 time, 1 of 35 cycles     Secondary malignant neoplasm of bone   9/24/2019 Initial Diagnosis    Secondary malignant neoplasm of bone     10/8/2019 - 10/28/2019 Chemotherapy    Treatment Summary   Plan Name: OP NSCLC PACLITAXEL + CARBOPLATIN (AUC) QW + RADIATION  Treatment Goal: Control  Status: Inactive  Start Date: 10/8/2019  End Date: 10/22/2019  Provider: Cricket Interiano MD  Chemotherapy: CARBOplatin (PARAPLATIN) 290 mg in sodium chloride 0.9% 250 mL chemo infusion, 290 mg (100 % of original dose 290 mg), Intravenous, Clinic/HOD 1 time, 3 of 6 cycles  Dose modification:   (original dose 290 mg, Cycle 1),   (original dose 300 mg, Cycle 2)  Administration: 290 mg (10/8/2019), 300 mg (10/15/2019)  PACLitaxel (TAXOL) 45 mg/m2 = 90 mg in sodium chloride 0.9% 250 mL chemo infusion, 45 mg/m2 = 90 mg, Intravenous, Clinic/HOD 1 time, 3 of 6 cycles  Administration: 90 mg (10/8/2019), 90 mg (10/15/2019), 90 mg (10/22/2019)     10/31/2019 - 9/10/2020 Chemotherapy    Treatment Summary   Plan Name: OP PEMBROLIZUMAB 200MG Q3W  Treatment Goal: Palliative  Status: Active  Start Date: 10/31/2019  End Date: 5/29/2020  Provider: Cricket Interiano MD  Chemotherapy: pembrolizumab (KEYTRUDA) 200 mg in sodium chloride 0.9% 108 mL chemo infusion, 200 mg, Intravenous, Clinic/HOD 1 time, 11 of 11 cycles  Administration: 200 mg (10/31/2019), 200 mg (11/21/2019), 200 mg (12/12/2019), 200 mg (1/2/2020), 200 mg  (1/23/2020), 200 mg (2/14/2020), 200 mg (3/6/2020), 200 mg (3/27/2020), 200 mg (4/17/2020), 200 mg (5/8/2020), 200 mg (5/29/2020)     11/17/2020 -  Chemotherapy    Treatment Summary   Plan Name: [facility] [id number] ARM B RAMUCIRUMAB PEMBROLIZUMAB Q3W  Treatment Goal: Control  Status: Active  Start Date: 11/17/2020  End Date: 11/1/2022 (Planned)  Provider: Jevon Key MD  Chemotherapy: INV [id number] (pembrolizumab) 200 mg in INV sodium chloride 0.9 % 100 mL chemo infusion, 200 mg, Intravenous, Clinic/HOD 1 time, 1 of 35 cycles          Interval History:   Today patient feels well, with no complaints.  He denies fevers, chills, diarrhea, HA, vision changes, SOB, hemoptysis, cough.      ROS:  Review of Systems   Constitutional: Negative for chills, fever and weight loss.   HENT: Negative for congestion, hearing loss, nosebleeds, sinus pain and sore throat.    Eyes: Negative for blurred vision and double vision.   Respiratory: Negative for cough, hemoptysis and shortness of breath.    Cardiovascular: Negative for chest pain and leg swelling.   Gastrointestinal: Negative for abdominal pain, blood in stool, diarrhea, nausea and vomiting.   Genitourinary: Negative for dysuria, frequency and hematuria.   Musculoskeletal: Negative for back pain, joint pain and myalgias.   Skin: Negative for itching and rash.   Neurological: Negative for dizziness, tingling, sensory change, speech change and headaches.   Endo/Heme/Allergies: Does not bruise/bleed easily.   Psychiatric/Behavioral: The patient is not nervous/anxious.           A complete 12-point review of systems was reviewed and is negative except as mentioned above.     Past Medical History:   Past Medical History:   Diagnosis Date    Hypertension     Lung cancer     Lung mass     left lung        Allergies:   Review of patient's allergies indicates:  No Known Allergies     Medications:   Current Outpatient Medications   Medication Sig Dispense Refill    ascorbic  "acid-multivit-min (VITAMIN C ENERGY BOOSTER) 1,000 mg PwEP Take by mouth. Patient takes 3,000 mg per day      atenoloL (TENORMIN) 25 MG tablet Take 1 tablet (25 mg total) by mouth once daily. 30 tablet 11     No current facility-administered medications for this visit.         Physical Exam:   BP (!) 164/91   Pulse 74   Temp 98 °F (36.7 °C) (Temporal)   Resp 18   Ht 5' 10" (1.778 m)   Wt 112.2 kg (247 lb 5.7 oz)   SpO2 96%   BMI 35.49 kg/m²      ECOG Performance Status: (foot note - ECOG PS provided by Eastern Cooperative Oncology Group) 0 - Asymptomatic    Physical Exam   Constitutional: He is oriented to person, place, and time and well-developed, well-nourished, and in no distress. No distress.   HENT:   Head: Normocephalic and atraumatic.   Eyes: Conjunctivae and EOM are normal.   Neck: Normal range of motion. Neck supple.   Cardiovascular: Normal rate and regular rhythm. Exam reveals no gallop and no friction rub.   No murmur heard.  Pulmonary/Chest: Effort normal. No respiratory distress.   Abdominal: Soft. He exhibits no distension. There is no abdominal tenderness.   Musculoskeletal: Normal range of motion.         General: No edema.   Neurological: He is alert and oriented to person, place, and time.   Skin: Skin is warm and dry. No rash noted.   Psychiatric: Affect and judgment normal.             Labs:   Recent Results (from the past 48 hour(s))   CBC W/ AUTO DIFFERENTIAL    Collection Time: 12/07/20 11:11 AM   Result Value Ref Range    WBC 10.23 3.90 - 12.70 K/uL    RBC 5.17 4.60 - 6.20 M/uL    Hemoglobin 15.8 14.0 - 18.0 g/dL    Hematocrit 48.7 40.0 - 54.0 %    MCV 94 82 - 98 fL    MCH 30.6 27.0 - 31.0 pg    MCHC 32.4 32.0 - 36.0 g/dL    RDW 13.2 11.5 - 14.5 %    Platelets 240 150 - 350 K/uL    MPV 9.7 9.2 - 12.9 fL    Immature Granulocytes 0.4 0.0 - 0.5 %    Gran # (ANC) 6.3 1.8 - 7.7 K/uL    Immature Grans (Abs) 0.04 0.00 - 0.04 K/uL    Lymph # 2.3 1.0 - 4.8 K/uL    Mono # 1.0 0.3 - 1.0 K/uL "    Eos # 0.4 0.0 - 0.5 K/uL    Baso # 0.10 0.00 - 0.20 K/uL    nRBC 0 0 /100 WBC    Gran % 61.9 38.0 - 73.0 %    Lymph % 22.9 18.0 - 48.0 %    Mono % 9.7 4.0 - 15.0 %    Eosinophil % 4.1 0.0 - 8.0 %    Basophil % 1.0 0.0 - 1.9 %    Differential Method Automated    COMPREHENSIVE METABOLIC PANEL    Collection Time: 12/07/20 11:11 AM   Result Value Ref Range    Sodium 141 136 - 145 mmol/L    Potassium 4.2 3.5 - 5.1 mmol/L    Chloride 103 95 - 110 mmol/L    CO2 28 23 - 29 mmol/L    Glucose 140 (H) 70 - 110 mg/dL    BUN 12 6 - 20 mg/dL    Creatinine 0.8 0.5 - 1.4 mg/dL    Calcium 8.8 8.7 - 10.5 mg/dL    Total Protein 7.6 6.0 - 8.4 g/dL    Albumin 3.7 3.5 - 5.2 g/dL    Total Bilirubin 0.3 0.1 - 1.0 mg/dL    Alkaline Phosphatase 67 55 - 135 U/L    AST 16 10 - 40 U/L    ALT 21 10 - 44 U/L    Anion Gap 10 8 - 16 mmol/L    eGFR if African American >60 >60 mL/min/1.73 m^2    eGFR if non African American >60 >60 mL/min/1.73 m^2        Imaging:  CT Chest Abdomen Pelvis With Contrast  Narrative: EXAMINATION:  CT CHEST ABDOMEN PELVIS WITH CONTRAST (XPD)    CLINICAL HISTORY:  baseline Y4531G/ screening; Malignant neoplasm of left main bronchus    TECHNIQUE:  Low dose axial images, sagittal and coronal reformations were obtained from the thoracic inlet to the pubic symphysis following the IV administration of 100 mL of Omnipaque 350 and the oral administration of 30 ml of Omnipaque 350.    COMPARISON:  PET-CT 09/10/2020. PET-CT 09/19/2019.  CTA chest abdomen pelvis 08/22/2020.    FINDINGS:  Chest:    Base of the neck: Several soft tissue lesions are present along the left side of the thyroid that demonstrate hypermetabolic activity on prior FDG PET scan consistent with enlarged lymph nodes.  Allowing for variation in technique, these appear similar in size to most recent comparison PET CT.  For reference, largest measures 2.4 cm short axis (axial series 2, image 17).    Heart and great vessels: Heart is normal in size without  anterior pericardial fluid.  Trace fluid tracking within the pericardial recesses.    Adenopathy: 1.4 cm short axis right paratracheal lymph node (axial series 2, image 26) with previous corresponding PET tracer avidity.  1.6 cm short axis subcarinal lymph node (axial series 2, image 54) with previous corresponding PET tracer avidity.  No axillary lymphadenopathy.    Esophagus: Within normal limits.    Lungs/airways: Relatively decreased volume of the left upper lobe.  Persistent irregular soft tissue mass in the anterior aspect of the left upper lobe abutting the mediastinal and anterior pleura measuring up to 5.0 x 7.1 x 5.7 cm (AP x TV x CC).  Mass extends towards the mediastinum and partially surrounds the left pulmonary artery without narrowing.  There is adjacent pleuroparenchymal thickening and distortion surrounding bandlike densities, likely related to component of scarring and sequela of prior radiation.  Allowing for variation in technique, overall size and extent of this mass is similar to PET-CT 09/10/2020 and is significantly improved compared to initial CTA 08/22/2019.  Stable 0.5 cm nodule in the lateral basal segment left lower lobe (axial series 2, image 72).  No new mass.    Stable atelectatic changes and mild posterior pleural thickening in the left lower lobe.  Centrilobular emphysematous changes with an upper lobe predominance.    Abdomen:    Liver: Normal in size and attenuation.  No focal lesion.  Portal vein is patent.    Gallbladder: Within normal limits.    Biliary system: Non-dilated.    Spleen: Within normal limits.    Pancreas: No mass or peripancreatic fat stranding.  There are few scattered calcifications about the pancreatic head that may represent sequela of remote pancreatitis.    Adrenals: Left adrenal gland is normal.  Stable 0.9 cm right adrenal gland nodule too small to characterize though noncontrast Hounsfield attenuation on prior PET suggestive of adenoma.    Kidneys: Normal  in size and position. No focal renal abnormality. No nephrolithiasis or hydronephrosis. Ureters are non-dilated.    Bowel: Within normal limits.  No evidence of obstruction.  Numerous colonic diverticula without evidence of diverticulitis.  Appendix normal.    Peritoneum: No ascites or pneumoperitoneum.    Abdominal Adenopathy: There few prominent lymph nodes in the tete hepatis and around the pancreatic head that are relatively unchanged since several prior exams and did not demonstrate hypermetabolic activity on prior PET-CT.  For example there is a 1.1 cm periportal lymph node (coronal series 601, image 65).  No new lymphadenopathy.    Vasculature: No aneurysm.  Scattered calcified noncalcified atherosclerotic plaque throughout the abdominal aorta and major branch vessels.  More prominent area of noncalcified plaque versus mural thrombus in the distal aorta just proximal to the bifurcation, unchanged since CTA 08/22/2019.    Pelvis:    Urinary bladder: Unremarkable.    Male reproductive: Dystrophic calcification the prostate    Pelvis adenopathy: None.    Bones: Multilevel degenerative changes of the spine without acute fracture or destructive process.  Healed right posterior rib fracture.    Miscellaneous: None.  Impression: In this patient with known left lung squamous cell carcinoma and metastatic adenopathy, there is similar appearance of a left upper lobe primary mass with associated volume loss and surrounding fibrosis consistent with post radiation change.  Stable left lower lobe subcentimeter nodule.  No new pulmonary mass on today's exam.    Similar size of previous PET avid nodes along the left aspect of the thyroid, right paratracheal, and subcarinal regions.    Few prominent abdominal lymph nodes, predominantly in the tete hepatis and peripancreatic regions, unchanged since several prior examinations.    Additional stable incidental findings, as above.    Electronically signed by resident: Jevon  April  Date:    11/06/2020  Time:    19:26    Electronically signed by: Jamar Horner  Date:    11/07/2020  Time:    07:48            Diagnoses:       1. Lung cancer, main bronchus, left    2. Examination of participant in clinical trial    3. Secondary malignant neoplasm of bone          Assessment and Plan:         1. Stage IV NSCLC:   -Diagnosed Aug 2019, PDL1 5%, Guardant 360 without targetable  mutations  -ECOG 0  -Previously received carbo-taxol with RT (grade 3 anaphylaxis to taxol) Aug-Oct 2019 then Keytruda single agent until PET showed progression 9/10/20  - tissue sent for screening for lungMAP trial, allocated to the un-matched arm Ramucirumab + Keytruda vs. SOC. He has been randomized the experimental arm Ramucirumab + Keytruda. Patient still without symptoms and good PS.    Proceed with treatment on trial.       RTC per research RN, Jane.     The patient agrees with the plan, and all questions have been answered to their satisfaction.      More than 25 mins were spent during this encounter, greater than 50% was spent in direct counseling and/or coordination of care.     Lux Restrepo M.D., M.S., F.A.C.P.  Hematology and Oncology Attending  [facility]

## 2020-12-08 NOTE — PLAN OF CARE
Pt ambulatory to unit for cycle two of I3925I Trial infusion. Denies any complaints. PIV started without difficulty. Veinpuncture to R  for labs prior to chemo infusions. Pt tolerated infusions well. Waited 60 minutes after Ramucirumab infusion prior to Pembrolizumab infusion and one hour obs after pembrolizumab. No adverse reactions noted. PIV removed with catheter intact. Pt ambulatory from clinic in NAD with wife.

## 2020-12-09 ENCOUNTER — RESEARCH ENCOUNTER (OUTPATIENT)
Dept: HEMATOLOGY/ONCOLOGY | Facility: CLINIC | Age: 60
End: 2020-12-09

## 2020-12-09 ENCOUNTER — PATIENT MESSAGE (OUTPATIENT)
Dept: HEMATOLOGY/ONCOLOGY | Facility: CLINIC | Age: 60
End: 2020-12-09

## 2020-12-09 ENCOUNTER — PATIENT MESSAGE (OUTPATIENT)
Dept: ADMINISTRATIVE | Facility: OTHER | Age: 60
End: 2020-12-09

## 2020-12-09 VITALS — DIASTOLIC BLOOD PRESSURE: 89 MMHG | SYSTOLIC BLOOD PRESSURE: 151 MMHG

## 2020-12-09 NOTE — PROGRESS NOTES
"  Kashif Iverson  MRN # [id number]  [id number] trial  12/08/2020     Study ID# [id number]  [id number]     Randomized 11/10/2020 to ARM B: Ramucirumab + Pembrolizumab Q3W      Cycle 2/ Day 1    Pt arrived to clinic this morning for appts with lab, MD and chemo infusion. Pt was accompanied by his wife and was in a pleasant mood. He stated he tolerated the first treatment well. Pt voiced his willingness to participate in trial.    AE's reviewed:  Pt denies any fever, SOB, difficulty breathing, chest pain, headaches, and diarrhea. He is eating well and remains active.     Hypertension, GR2 (intermittent) - Pt has a h/o HTN; Pt BP in clinic was noted initially @ 164/91, however this was retaken after he was given a chance to sit quietly. This resulted as: 151/89. Pt's wife states they are also checking his BP at home and they aren't typically that high. Pt is compliant w/ BP med. Pt is asymptomatic. Dr Restrepo aware, NCS; no new orders received, instructed pt to continue to monitor his BP at home and notify us if they become consistently high.   Allergic rhinitis, GR1- ongoing but intermittent based on the weather; pt does not have any complaints regarding this @ present time, remains NCS and no new orders given. Will cont to monitor.    Back Pain, GR1- Pt reports intermittent upper back pain, between the shoulder blades, which he describes as "dull". Pt voices that if he changes positions it is improved; Dr Restrepo notified, NCS, no new orders given.     Pt was seen and assessed per Dr Restrepo; H&P done.     VS stable: /89 (repeat), P 74, T 98.0  WT: 112.2 kg (247.5 pds)  PS: 0    Lab results were reviewed by Dr Restrepo and myself and all are w/in parameters for treatment today. Dose Modification section 8.4 & 8.5 were reviewed and pt required no dose mods.     Pt did have blood drawn this morning for WEEK 4 timepoint (buffy coat and plasma). Blood was processed per protocol. Buffy coat and plasma were frozen and will be sent out " w/ batch shipment.    Treatment plan was entered into [facility] and reviewed w/ Dr Restrepo. Ramucirumab dose was calculated based off of today's wt. Orders were signed by Dr Restrepo.     Chemotherapy infusion nurse Barrett Ramires RN educated on the order and duration of drug administration, 1 hour observation time post Cyramza, and 1 hour observation time post Keytruda . Infusion guidelines handout provided and instructed to call me for any infusion issues. She verbalized understanding.     Pt has appt to return to clinic in 3 weeks for Cycle 3. They are aware of these appts. Pt is also due for 1st 6 weeks after Cycle 1 CT Scans. These will be scheduled by clinic scheduler.   Pt has my contact information and was instructed to call us for any interim issues, concerns or questions they have between today and his next appt. They verbalized understanding.

## 2020-12-09 NOTE — PROGRESS NOTES
Zayra Kashif  MRN # [id number]  [id number] trial  12/08/2020     Study ID# [id number]  [id number]     Randomized 11/10/2020 to ARM B: Ramucirumab + Pembrolizumab Q3W      Cycle 2/ Day 1      Pt's blood pressure was retaken after he had an opportunity to sit quietly. This was noted to 151/89, Dr Restrepo aware, no new orders received.

## 2020-12-10 ENCOUNTER — PATIENT MESSAGE (OUTPATIENT)
Dept: ADMINISTRATIVE | Facility: OTHER | Age: 60
End: 2020-12-10

## 2020-12-11 ENCOUNTER — PATIENT MESSAGE (OUTPATIENT)
Dept: ADMINISTRATIVE | Facility: OTHER | Age: 60
End: 2020-12-11

## 2020-12-12 ENCOUNTER — PATIENT MESSAGE (OUTPATIENT)
Dept: ADMINISTRATIVE | Facility: OTHER | Age: 60
End: 2020-12-12

## 2020-12-13 ENCOUNTER — PATIENT MESSAGE (OUTPATIENT)
Dept: ADMINISTRATIVE | Facility: OTHER | Age: 60
End: 2020-12-13

## 2020-12-14 ENCOUNTER — PATIENT MESSAGE (OUTPATIENT)
Dept: ADMINISTRATIVE | Facility: OTHER | Age: 60
End: 2020-12-14

## 2020-12-15 ENCOUNTER — PATIENT MESSAGE (OUTPATIENT)
Dept: ADMINISTRATIVE | Facility: OTHER | Age: 60
End: 2020-12-15

## 2020-12-16 ENCOUNTER — PATIENT MESSAGE (OUTPATIENT)
Dept: ADMINISTRATIVE | Facility: OTHER | Age: 60
End: 2020-12-16

## 2020-12-17 ENCOUNTER — PATIENT MESSAGE (OUTPATIENT)
Dept: ADMINISTRATIVE | Facility: OTHER | Age: 60
End: 2020-12-17

## 2020-12-18 ENCOUNTER — PATIENT MESSAGE (OUTPATIENT)
Dept: ADMINISTRATIVE | Facility: OTHER | Age: 60
End: 2020-12-18

## 2020-12-19 ENCOUNTER — PATIENT MESSAGE (OUTPATIENT)
Dept: ADMINISTRATIVE | Facility: OTHER | Age: 60
End: 2020-12-19

## 2020-12-20 ENCOUNTER — PATIENT MESSAGE (OUTPATIENT)
Dept: ADMINISTRATIVE | Facility: OTHER | Age: 60
End: 2020-12-20

## 2020-12-21 ENCOUNTER — PATIENT MESSAGE (OUTPATIENT)
Dept: ADMINISTRATIVE | Facility: OTHER | Age: 60
End: 2020-12-21

## 2020-12-22 ENCOUNTER — PATIENT MESSAGE (OUTPATIENT)
Dept: ADMINISTRATIVE | Facility: OTHER | Age: 60
End: 2020-12-22

## 2020-12-23 ENCOUNTER — PATIENT MESSAGE (OUTPATIENT)
Dept: ADMINISTRATIVE | Facility: OTHER | Age: 60
End: 2020-12-23

## 2020-12-24 ENCOUNTER — PATIENT MESSAGE (OUTPATIENT)
Dept: ADMINISTRATIVE | Facility: OTHER | Age: 60
End: 2020-12-24

## 2020-12-25 ENCOUNTER — PATIENT MESSAGE (OUTPATIENT)
Dept: ADMINISTRATIVE | Facility: OTHER | Age: 60
End: 2020-12-25

## 2020-12-26 ENCOUNTER — PATIENT MESSAGE (OUTPATIENT)
Dept: ADMINISTRATIVE | Facility: OTHER | Age: 60
End: 2020-12-26

## 2020-12-29 ENCOUNTER — HOSPITAL ENCOUNTER (OUTPATIENT)
Dept: RADIOLOGY | Facility: HOSPITAL | Age: 60
Discharge: HOME | End: 2020-12-29
Attending: INTERNAL MEDICINE
Payer: COMMERCIAL

## 2020-12-29 ENCOUNTER — LAB VISIT (OUTPATIENT)
Dept: LAB | Facility: HOSPITAL | Age: 60
End: 2020-12-29
Attending: INTERNAL MEDICINE
Payer: COMMERCIAL

## 2020-12-29 DIAGNOSIS — C34.02 LUNG CANCER, MAIN BRONCHUS, LEFT: Primary | ICD-10-CM

## 2020-12-29 DIAGNOSIS — Z00.6 EXAMINATION OF PARTICIPANT IN CLINICAL TRIAL: ICD-10-CM

## 2020-12-29 DIAGNOSIS — C34.02 LUNG CANCER, MAIN BRONCHUS, LEFT: ICD-10-CM

## 2020-12-29 LAB
ALBUMIN SERPL BCP-MCNC: 3.5 G/DL (ref 3.5–5.2)
ALP SERPL-CCNC: 66 U/L (ref 55–135)
ALT SERPL W/O P-5'-P-CCNC: 21 U/L (ref 10–44)
ANION GAP SERPL CALC-SCNC: 10 MMOL/L (ref 8–16)
AST SERPL-CCNC: 20 U/L (ref 10–40)
BASOPHILS # BLD AUTO: 0.1 K/UL (ref 0–0.2)
BASOPHILS NFR BLD: 1 % (ref 0–1.9)
BILIRUB SERPL-MCNC: 0.4 MG/DL (ref 0.1–1)
BUN SERPL-MCNC: 9 MG/DL (ref 6–20)
CALCIUM SERPL-MCNC: 9.2 MG/DL (ref 8.7–10.5)
CHLORIDE SERPL-SCNC: 105 MMOL/L (ref 95–110)
CO2 SERPL-SCNC: 25 MMOL/L (ref 23–29)
CREAT SERPL-MCNC: 0.9 MG/DL (ref 0.5–1.4)
DIFFERENTIAL METHOD: NORMAL
DRUG STUDY SPECIMEN TYPE: NORMAL
DRUG STUDY TEST NAME: NORMAL
DRUG STUDY TEST RESULT: NORMAL
EOSINOPHIL # BLD AUTO: 0.4 K/UL (ref 0–0.5)
EOSINOPHIL NFR BLD: 4.2 % (ref 0–8)
ERYTHROCYTE [DISTWIDTH] IN BLOOD BY AUTOMATED COUNT: 13.2 % (ref 11.5–14.5)
EST. GFR  (AFRICAN AMERICAN): >60 ML/MIN/1.73 M^2
EST. GFR  (NON AFRICAN AMERICAN): >60 ML/MIN/1.73 M^2
GLUCOSE SERPL-MCNC: 129 MG/DL (ref 70–110)
HCT VFR BLD AUTO: 47.5 % (ref 40–54)
HGB BLD-MCNC: 15.3 G/DL (ref 14–18)
IMM GRANULOCYTES # BLD AUTO: 0.02 K/UL (ref 0–0.04)
IMM GRANULOCYTES NFR BLD AUTO: 0.2 % (ref 0–0.5)
LYMPHOCYTES # BLD AUTO: 2.8 K/UL (ref 1–4.8)
LYMPHOCYTES NFR BLD: 28.7 % (ref 18–48)
MCH RBC QN AUTO: 30.3 PG (ref 27–31)
MCHC RBC AUTO-ENTMCNC: 32.2 G/DL (ref 32–36)
MCV RBC AUTO: 94 FL (ref 82–98)
MONOCYTES # BLD AUTO: 1 K/UL (ref 0.3–1)
MONOCYTES NFR BLD: 9.9 % (ref 4–15)
NEUTROPHILS # BLD AUTO: 5.4 K/UL (ref 1.8–7.7)
NEUTROPHILS NFR BLD: 56 % (ref 38–73)
NRBC BLD-RTO: 0 /100 WBC
PLATELET # BLD AUTO: 201 K/UL (ref 150–350)
PMV BLD AUTO: 9.6 FL (ref 9.2–12.9)
POTASSIUM SERPL-SCNC: 4.3 MMOL/L (ref 3.5–5.1)
PROT SERPL-MCNC: 7.2 G/DL (ref 6–8.4)
RBC # BLD AUTO: 5.05 M/UL (ref 4.6–6.2)
SODIUM SERPL-SCNC: 140 MMOL/L (ref 136–145)
WBC # BLD AUTO: 9.65 K/UL (ref 3.9–12.7)

## 2020-12-29 PROCEDURE — 99000 SPECIMEN HANDLING OFFICE-LAB: CPT

## 2020-12-29 PROCEDURE — 80053 COMPREHEN METABOLIC PANEL: CPT

## 2020-12-29 PROCEDURE — 71260 CT THORAX DX C+: CPT | Mod: 26,,, | Performed by: RADIOLOGY

## 2020-12-29 PROCEDURE — 71260 CT CHEST ABDOMEN PELVIS WITH CONTRAST (XPD): ICD-10-PCS | Mod: 26,,, | Performed by: RADIOLOGY

## 2020-12-29 PROCEDURE — 74177 CT CHEST ABDOMEN PELVIS WITH CONTRAST (XPD): ICD-10-PCS | Mod: 26,,, | Performed by: RADIOLOGY

## 2020-12-29 PROCEDURE — 25500020 PHARM REV CODE 255: Performed by: INTERNAL MEDICINE

## 2020-12-29 PROCEDURE — 85025 COMPLETE CBC W/AUTO DIFF WBC: CPT

## 2020-12-29 PROCEDURE — 36415 COLL VENOUS BLD VENIPUNCTURE: CPT

## 2020-12-29 PROCEDURE — 74177 CT ABD & PELVIS W/CONTRAST: CPT | Mod: 26,,, | Performed by: RADIOLOGY

## 2020-12-29 PROCEDURE — 74177 CT ABD & PELVIS W/CONTRAST: CPT | Mod: TC

## 2020-12-29 RX ADMIN — IOHEXOL 100 ML: 350 INJECTION, SOLUTION INTRAVENOUS at 10:12

## 2020-12-29 RX ADMIN — IOHEXOL 15 ML: 350 INJECTION, SOLUTION INTRAVENOUS at 10:12

## 2020-12-30 ENCOUNTER — INFUSION (OUTPATIENT)
Dept: INFUSION THERAPY | Facility: HOSPITAL | Age: 60
End: 2020-12-30
Attending: INTERNAL MEDICINE
Payer: COMMERCIAL

## 2020-12-30 ENCOUNTER — OFFICE VISIT (OUTPATIENT)
Dept: HEMATOLOGY/ONCOLOGY | Facility: CLINIC | Age: 60
End: 2020-12-30
Payer: COMMERCIAL

## 2020-12-30 ENCOUNTER — RESEARCH ENCOUNTER (OUTPATIENT)
Dept: HEMATOLOGY/ONCOLOGY | Facility: CLINIC | Age: 60
End: 2020-12-30

## 2020-12-30 ENCOUNTER — PATIENT MESSAGE (OUTPATIENT)
Dept: ADMINISTRATIVE | Facility: OTHER | Age: 60
End: 2020-12-30

## 2020-12-30 VITALS
HEIGHT: 70 IN | TEMPERATURE: 99 F | RESPIRATION RATE: 16 BRPM | BODY MASS INDEX: 35.79 KG/M2 | WEIGHT: 250 LBS | DIASTOLIC BLOOD PRESSURE: 82 MMHG | OXYGEN SATURATION: 95 % | SYSTOLIC BLOOD PRESSURE: 134 MMHG | HEART RATE: 80 BPM

## 2020-12-30 VITALS
WEIGHT: 250 LBS | HEART RATE: 69 BPM | HEIGHT: 70 IN | OXYGEN SATURATION: 97 % | TEMPERATURE: 99 F | SYSTOLIC BLOOD PRESSURE: 119 MMHG | BODY MASS INDEX: 35.79 KG/M2 | DIASTOLIC BLOOD PRESSURE: 71 MMHG | RESPIRATION RATE: 18 BRPM

## 2020-12-30 DIAGNOSIS — D63.8 ANEMIA, CHRONIC DISEASE: ICD-10-CM

## 2020-12-30 DIAGNOSIS — C34.02 LUNG CANCER, MAIN BRONCHUS, LEFT: Primary | ICD-10-CM

## 2020-12-30 DIAGNOSIS — Z00.6 EXAMINATION OF PARTICIPANT IN CLINICAL TRIAL: ICD-10-CM

## 2020-12-30 DIAGNOSIS — C79.51 SECONDARY MALIGNANT NEOPLASM OF BONE: ICD-10-CM

## 2020-12-30 DIAGNOSIS — C34.02 LUNG CANCER, MAIN BRONCHUS, LEFT: ICD-10-CM

## 2020-12-30 DIAGNOSIS — C79.51 SECONDARY MALIGNANT NEOPLASM OF BONE: Primary | ICD-10-CM

## 2020-12-30 PROCEDURE — 99999 PR PBB SHADOW E&M-EST. PATIENT-LVL III: CPT | Mod: PBBFAC,,, | Performed by: INTERNAL MEDICINE

## 2020-12-30 PROCEDURE — 63600175 PHARM REV CODE 636 W HCPCS: Performed by: INTERNAL MEDICINE

## 2020-12-30 PROCEDURE — 96417 CHEMO IV INFUS EACH ADDL SEQ: CPT

## 2020-12-30 PROCEDURE — 1126F PR PAIN SEVERITY QUANTIFIED, NO PAIN PRESENT: ICD-10-PCS | Mod: S$GLB,,, | Performed by: INTERNAL MEDICINE

## 2020-12-30 PROCEDURE — 96367 TX/PROPH/DG ADDL SEQ IV INF: CPT

## 2020-12-30 PROCEDURE — 25000003 PHARM REV CODE 250: Performed by: INTERNAL MEDICINE

## 2020-12-30 PROCEDURE — 99215 OFFICE O/P EST HI 40 MIN: CPT | Mod: S$GLB,,, | Performed by: INTERNAL MEDICINE

## 2020-12-30 PROCEDURE — 99999 PR PBB SHADOW E&M-EST. PATIENT-LVL III: ICD-10-PCS | Mod: PBBFAC,,, | Performed by: INTERNAL MEDICINE

## 2020-12-30 PROCEDURE — 96413 CHEMO IV INFUSION 1 HR: CPT

## 2020-12-30 PROCEDURE — 3008F PR BODY MASS INDEX (BMI) DOCUMENTED: ICD-10-PCS | Mod: CPTII,S$GLB,, | Performed by: INTERNAL MEDICINE

## 2020-12-30 PROCEDURE — 3008F BODY MASS INDEX DOCD: CPT | Mod: CPTII,S$GLB,, | Performed by: INTERNAL MEDICINE

## 2020-12-30 PROCEDURE — 1126F AMNT PAIN NOTED NONE PRSNT: CPT | Mod: S$GLB,,, | Performed by: INTERNAL MEDICINE

## 2020-12-30 PROCEDURE — 99215 PR OFFICE/OUTPT VISIT, EST, LEVL V, 40-54 MIN: ICD-10-PCS | Mod: S$GLB,,, | Performed by: INTERNAL MEDICINE

## 2020-12-30 RX ORDER — SODIUM CHLORIDE 0.9 % (FLUSH) 0.9 %
10 SYRINGE (ML) INJECTION
Status: CANCELLED | OUTPATIENT
Start: 2020-12-30

## 2020-12-30 RX ORDER — EPINEPHRINE 0.3 MG/.3ML
0.3 INJECTION SUBCUTANEOUS ONCE AS NEEDED
Status: CANCELLED | OUTPATIENT
Start: 2020-12-30

## 2020-12-30 RX ORDER — EPINEPHRINE 0.3 MG/.3ML
0.3 INJECTION SUBCUTANEOUS ONCE AS NEEDED
Status: DISCONTINUED | OUTPATIENT
Start: 2020-12-30 | End: 2020-12-30 | Stop reason: HOSPADM

## 2020-12-30 RX ORDER — DIPHENHYDRAMINE HYDROCHLORIDE 50 MG/ML
50 INJECTION INTRAMUSCULAR; INTRAVENOUS ONCE AS NEEDED
Status: DISCONTINUED | OUTPATIENT
Start: 2020-12-30 | End: 2020-12-30 | Stop reason: HOSPADM

## 2020-12-30 RX ORDER — SODIUM CHLORIDE 0.9 % (FLUSH) 0.9 %
10 SYRINGE (ML) INJECTION
Status: DISCONTINUED | OUTPATIENT
Start: 2020-12-30 | End: 2020-12-30 | Stop reason: HOSPADM

## 2020-12-30 RX ORDER — DIPHENHYDRAMINE HYDROCHLORIDE 50 MG/ML
50 INJECTION INTRAMUSCULAR; INTRAVENOUS ONCE AS NEEDED
Status: CANCELLED | OUTPATIENT
Start: 2020-12-30

## 2020-12-30 RX ORDER — HEPARIN 100 UNIT/ML
500 SYRINGE INTRAVENOUS
Status: DISCONTINUED | OUTPATIENT
Start: 2020-12-30 | End: 2020-12-30 | Stop reason: HOSPADM

## 2020-12-30 RX ORDER — HEPARIN 100 UNIT/ML
500 SYRINGE INTRAVENOUS
Status: CANCELLED | OUTPATIENT
Start: 2020-12-30

## 2020-12-30 RX ADMIN — SODIUM CHLORIDE: 0.9 INJECTION, SOLUTION INTRAVENOUS at 10:12

## 2020-12-30 RX ADMIN — SODIUM CHLORIDE 1134 MG: 0.9 INJECTION, SOLUTION INTRAVENOUS at 11:12

## 2020-12-30 RX ADMIN — DIPHENHYDRAMINE HYDROCHLORIDE 50 MG: 50 INJECTION, SOLUTION INTRAMUSCULAR; INTRAVENOUS at 10:12

## 2020-12-30 NOTE — PROGRESS NOTES
ONCOLOGY FOLLOW UP VISIT.     Reason for visit: Re-staging and toxicity check on Ramucirumab + Keytruda arm of Lung MAP trial for for stage IV NSCLC    Best Contact Phone Number(s): [phone] (home)      Cancer/Stage/TNM:   Cancer Staging  Lung cancer, main bronchus, left  Staging form: Lung, AJCC 8th Edition  - Clinical: Stage IVB (cT3, cN3, cM1c) - Signed by Jevon Key MD on 11/17/2020       Oncology History   Lung cancer, main bronchus, left   9/10/2019 Initial Diagnosis    Lung cancer, main bronchus, left     10/8/2019 - 10/28/2019 Chemotherapy    Treatment Summary   Plan Name: OP NSCLC PACLITAXEL + CARBOPLATIN (AUC) QW + RADIATION  Treatment Goal: Control  Status: Inactive  Start Date: 10/8/2019  End Date: 10/22/2019  Provider: Cricket Interiano MD  Chemotherapy: CARBOplatin (PARAPLATIN) 290 mg in sodium chloride 0.9% 250 mL chemo infusion, 290 mg (100 % of original dose 290 mg), Intravenous, Clinic/HOD 1 time, 3 of 6 cycles  Dose modification:   (original dose 290 mg, Cycle 1),   (original dose 300 mg, Cycle 2)  Administration: 290 mg (10/8/2019), 300 mg (10/15/2019)  PACLitaxel (TAXOL) 45 mg/m2 = 90 mg in sodium chloride 0.9% 250 mL chemo infusion, 45 mg/m2 = 90 mg, Intravenous, Clinic/HOD 1 time, 3 of 6 cycles  Administration: 90 mg (10/8/2019), 90 mg (10/15/2019), 90 mg (10/22/2019)     10/31/2019 - 9/10/2020 Chemotherapy    Treatment Summary   Plan Name: OP PEMBROLIZUMAB 200MG Q3W  Treatment Goal: Palliative  Status: Active  Start Date: 10/31/2019  End Date: 5/29/2020  Provider: Cricket Interiano MD  Chemotherapy: pembrolizumab (KEYTRUDA) 200 mg in sodium chloride 0.9% 108 mL chemo infusion, 200 mg, Intravenous, Clinic/HOD 1 time, 11 of 11 cycles  Administration: 200 mg (10/31/2019), 200 mg (11/21/2019), 200 mg (12/12/2019), 200 mg (1/2/2020), 200 mg (1/23/2020), 200 mg (2/14/2020), 200 mg (3/6/2020), 200 mg (3/27/2020), 200 mg (4/17/2020), 200 mg (5/8/2020), 200 mg (5/29/2020)      11/17/2020 Cancer Staged    Staging form: Lung, AJCC 8th Edition  - Clinical: Stage IVB (cT3, cN3, cM1c)     11/17/2020 -  Chemotherapy    Treatment Summary   Plan Name: [facility] [id number] ARM B RAMUCIRUMAB PEMBROLIZUMAB Q3W  Treatment Goal: Control  Status: Active  Start Date: 11/17/2020  End Date: 11/1/2022 (Planned)  Provider: Jevon Key MD  Chemotherapy: INV [id number] (pembrolizumab) 200 mg in INV sodium chloride 0.9 % 100 mL chemo infusion, 200 mg, Intravenous, Clinic/HOD 1 time, 3 of 35 cycles  Administration: 200 mg (11/17/2020), 200 mg (12/8/2020)     Secondary malignant neoplasm of bone   9/24/2019 Initial Diagnosis    Secondary malignant neoplasm of bone     10/8/2019 - 10/28/2019 Chemotherapy    Treatment Summary   Plan Name: OP NSCLC PACLITAXEL + CARBOPLATIN (AUC) QW + RADIATION  Treatment Goal: Control  Status: Inactive  Start Date: 10/8/2019  End Date: 10/22/2019  Provider: Cricket Interiano MD  Chemotherapy: CARBOplatin (PARAPLATIN) 290 mg in sodium chloride 0.9% 250 mL chemo infusion, 290 mg (100 % of original dose 290 mg), Intravenous, Clinic/HOD 1 time, 3 of 6 cycles  Dose modification:   (original dose 290 mg, Cycle 1),   (original dose 300 mg, Cycle 2)  Administration: 290 mg (10/8/2019), 300 mg (10/15/2019)  PACLitaxel (TAXOL) 45 mg/m2 = 90 mg in sodium chloride 0.9% 250 mL chemo infusion, 45 mg/m2 = 90 mg, Intravenous, Clinic/HOD 1 time, 3 of 6 cycles  Administration: 90 mg (10/8/2019), 90 mg (10/15/2019), 90 mg (10/22/2019)     10/31/2019 - 9/10/2020 Chemotherapy    Treatment Summary   Plan Name: OP PEMBROLIZUMAB 200MG Q3W  Treatment Goal: Palliative  Status: Active  Start Date: 10/31/2019  End Date: 5/29/2020  Provider: Cricket Interiano MD  Chemotherapy: pembrolizumab (KEYTRUDA) 200 mg in sodium chloride 0.9% 108 mL chemo infusion, 200 mg, Intravenous, Clinic/HOD 1 time, 11 of 11 cycles  Administration: 200 mg (10/31/2019), 200 mg (11/21/2019), 200 mg (12/12/2019), 200 mg  (1/2/2020), 200 mg (1/23/2020), 200 mg (2/14/2020), 200 mg (3/6/2020), 200 mg (3/27/2020), 200 mg (4/17/2020), 200 mg (5/8/2020), 200 mg (5/29/2020)     11/17/2020 -  Chemotherapy    Treatment Summary   Plan Name: [facility] [id number] ARM B RAMUCIRUMAB PEMBROLIZUMAB Q3W  Treatment Goal: Control  Status: Active  Start Date: 11/17/2020  End Date: 11/1/2022 (Planned)  Provider: Jevon Key MD  Chemotherapy: INV MK-[id number] (pembrolizumab) 200 mg in INV sodium chloride 0.9 % 100 mL chemo infusion, 200 mg, Intravenous, Clinic/HOD 1 time, 3 of 35 cycles  Administration: 200 mg (11/17/2020), 200 mg (12/8/2020)          Interval History:   Today patient feels well, with no complaints.  He denies fevers, chills, diarrhea, HA, vision changes, SOB, hemoptysis, cough.      ROS:  Review of Systems   Constitutional: Negative for chills, fever and weight loss.   HENT: Negative for congestion, hearing loss, nosebleeds, sinus pain and sore throat.    Eyes: Negative for blurred vision and double vision.   Respiratory: Negative for cough, hemoptysis and shortness of breath.    Cardiovascular: Negative for chest pain and leg swelling.   Gastrointestinal: Negative for abdominal pain, blood in stool, diarrhea, nausea and vomiting.   Genitourinary: Negative for dysuria, frequency and hematuria.   Musculoskeletal: Negative for back pain, joint pain and myalgias.   Skin: Negative for itching and rash.   Neurological: Negative for dizziness, tingling, sensory change, speech change and headaches.   Endo/Heme/Allergies: Does not bruise/bleed easily.   Psychiatric/Behavioral: The patient is not nervous/anxious.           A complete 12-point review of systems was reviewed and is negative except as mentioned above.     Past Medical History:   Past Medical History:   Diagnosis Date    Hypertension     Lung cancer     Lung mass     left lung        Allergies:   Review of patient's allergies indicates:  No Known Allergies     Medications:   Current  "Outpatient Medications   Medication Sig Dispense Refill    ascorbic acid-multivit-min (VITAMIN C ENERGY BOOSTER) 1,000 mg PwEP Take by mouth. Patient takes 3,000 mg per day      atenoloL (TENORMIN) 25 MG tablet Take 1 tablet (25 mg total) by mouth once daily. 30 tablet 11     No current facility-administered medications for this visit.      Facility-Administered Medications Ordered in Other Visits   Medication Dose Route Frequency Provider Last Rate Last Dose    alteplase injection 2 mg  2 mg Intra-Catheter PRN Jevon Key MD        diphenhydrAMINE injection 50 mg  50 mg Intravenous Once PRN Jevon Key MD        EPINEPHrine (EPIPEN) 0.3 mg/0.3 mL pen injection 0.3 mg  0.3 mg Intramuscular Once PRN Jevon Key MD        heparin, porcine (PF) 100 unit/mL injection flush 500 Units  500 Units Intravenous PRN Jevon Key MD        sodium chloride 0.9% 250 mL flush bag   Intravenous PRN Jevon Key MD 25 mL/hr at 12/30/20 1055      sodium chloride 0.9% flush 10 mL  10 mL Intravenous PRN Jevon Key MD            Physical Exam:   /82 (BP Location: Left arm, Patient Position: Sitting, BP Method: Large (Automatic))   Pulse 80   Temp 99 °F (37.2 °C) (Oral)   Resp 16   Ht 5' 10" (1.778 m)   Wt 113.4 kg (250 lb)   SpO2 95%   BMI 35.87 kg/m²      ECOG Performance Status: (foot note - ECOG PS provided by Eastern Cooperative Oncology Group) 0 - Asymptomatic    Physical Exam   Constitutional: He is oriented to person, place, and time and well-developed, well-nourished, and in no distress. No distress.   HENT:   Head: Normocephalic and atraumatic.   Eyes: Conjunctivae and EOM are normal.   Neck: Normal range of motion. Neck supple.   Cardiovascular: Normal rate and regular rhythm. Exam reveals no gallop and no friction rub.   No murmur heard.  Pulmonary/Chest: Effort normal. No respiratory distress.   Abdominal: Soft. He exhibits no distension. There is no abdominal " tenderness.   Musculoskeletal: Normal range of motion.         General: No edema.   Neurological: He is alert and oriented to person, place, and time.   Skin: Skin is warm and dry. No rash noted.   Psychiatric: Affect and judgment normal.             Labs:   Recent Results (from the past 48 hour(s))   DRUG STUDY SENDOUT, [facility].    Collection Time: 12/29/20  9:14 AM   Result Value Ref Range    Drug Study Test Name Drug Study     Drug Study Specimen Type BLOOD     Drug Study Test Result Result sent directly to physician    CBC W/ AUTO DIFFERENTIAL    Collection Time: 12/29/20  9:14 AM   Result Value Ref Range    WBC 9.65 3.90 - 12.70 K/uL    RBC 5.05 4.60 - 6.20 M/uL    Hemoglobin 15.3 14.0 - 18.0 g/dL    Hematocrit 47.5 40.0 - 54.0 %    MCV 94 82 - 98 fL    MCH 30.3 27.0 - 31.0 pg    MCHC 32.2 32.0 - 36.0 g/dL    RDW 13.2 11.5 - 14.5 %    Platelets 201 150 - 350 K/uL    MPV 9.6 9.2 - 12.9 fL    Immature Granulocytes 0.2 0.0 - 0.5 %    Gran # (ANC) 5.4 1.8 - 7.7 K/uL    Immature Grans (Abs) 0.02 0.00 - 0.04 K/uL    Lymph # 2.8 1.0 - 4.8 K/uL    Mono # 1.0 0.3 - 1.0 K/uL    Eos # 0.4 0.0 - 0.5 K/uL    Baso # 0.10 0.00 - 0.20 K/uL    nRBC 0 0 /100 WBC    Gran % 56.0 38.0 - 73.0 %    Lymph % 28.7 18.0 - 48.0 %    Mono % 9.9 4.0 - 15.0 %    Eosinophil % 4.2 0.0 - 8.0 %    Basophil % 1.0 0.0 - 1.9 %    Differential Method Automated    COMPREHENSIVE METABOLIC PANEL    Collection Time: 12/29/20  9:14 AM   Result Value Ref Range    Sodium 140 136 - 145 mmol/L    Potassium 4.3 3.5 - 5.1 mmol/L    Chloride 105 95 - 110 mmol/L    CO2 25 23 - 29 mmol/L    Glucose 129 (H) 70 - 110 mg/dL    BUN 9 6 - 20 mg/dL    Creatinine 0.9 0.5 - 1.4 mg/dL    Calcium 9.2 8.7 - 10.5 mg/dL    Total Protein 7.2 6.0 - 8.4 g/dL    Albumin 3.5 3.5 - 5.2 g/dL    Total Bilirubin 0.4 0.1 - 1.0 mg/dL    Alkaline Phosphatase 66 55 - 135 U/L    AST 20 10 - 40 U/L    ALT 21 10 - 44 U/L    Anion Gap 10 8 - 16 mmol/L    eGFR if African American >60.0 >60  mL/min/1.73 m^2    eGFR if non African American >60.0 >60 mL/min/1.73 m^2        Imaging:  CT Chest Abdomen Pelvis With Contrast  Narrative: EXAMINATION:  CT CHEST ABDOMEN PELVIS WITH CONTRAST (XPD)    CLINICAL HISTORY:  Clinical trial- restaging;Malignant neoplasm of left main bronchus    TECHNIQUE:  Low-dose CT of the chest abdomen and pelvis with contrast was acquired helically from the lung apices through the ischial tuberosities status post administration of 100 cc of Omnipaque 350.  Axial and reformatted images were reviewed.    COMPARISON:  CT 11/06/2020, FDG PET-CT 09/10/2020    FINDINGS:  Thoracic soft tissues are unremarkable.    Left-sided aortic arch.  Thoracic aorta is normal in caliber and contains moderate atherosclerosis.  Heart is normal in size without pericardial effusion.  Coronary artery atherosclerosis.    Enlarged left level 4 cervical lymph nodes at the base of the neck. Left level 4 index lymph node measures 2.5 cm (previously 2.4 cm). Lesion demonstrates mass effect on the adjacent left thyroid lobe and trachea with rightward deviation.    Stable right paratracheal and subcarinal adenopathy with lymph nodes measuring 1.4 and 1.6 cm respectively.    Stable sized left upper lobe pulmonary mass measures 6.9 x 5.0 cm (series 2, image 33).  Lesion abuts the anteromedial pleura extending to the mediastinum and partially encasing the left pulmonary artery.  Left upper lobe bronchus is occluded and there is persistent volume loss with leftward shift of the mediastinum and bandlike densities suggesting subsegmental atelectasis.  Several stable left lung pulmonary micronodules measuring up to 5 mm (series 2, image 62).  Increased size of a right lower lobe pulmonary nodule measuring 6 mm (series 2, image 63) which previously measured 3 mm.  4 mm right lower lobe nodule adjacent to the major fissure, not definitively visualized on prior examination (series 2, image 58).  Centrilobular emphysema.  No  significant pleural fluid or pneumothorax.    Liver is normal in size.  No focal hepatic lesion.  No gallstones or biliary ductal dilatation.  Portal vein is patent.    Spleen adrenal glands, and pancreas are unremarkable.    Several stable normal sized lymph nodes within the tete hepatis and peripancreatic regions.  No new adenopathy.    Kidneys concentrate contrast appropriately.  No hydronephrosis or ureteral dilatation.  Urinary bladder is unremarkable.    Stomach is unremarkable.  Small and large bowel are normal caliber.  Colonic diverticulosis.  No evidence of bowel obstruction or inflammation.  No ascites or free air.  Appendix is unremarkable.    Normal caliber abdominal aorta with moderate atherosclerosis.    Body wall soft tissues are unremarkable.    Healing right posterolateral 9th rib fracture.  No acute fracture or destructive osseous lesion  Impression: 1. Stable appearing left upper lobe mass in keeping with known lung cancer.  Grossly stable left level 4 cervical, right paratracheal, and subcarinal adenopathy consistent with metastatic disease as identified on prior FDG PET-CT.  2. Stable left lung pulmonary nodules.  New and enlarged subcentimeter right lower lobe pulmonary nodules which could represent additional metastatic disease.  Attention on follow-up.  3. Additional findings as above.    Electronically signed by resident: Jamar Fajardo  Date:    12/29/2020  Time:    11:45    Electronically signed by: Raphael Chauhan MD  Date:    12/29/2020  Time:    12:31            Diagnoses:       1. Lung cancer, main bronchus, left    2. Examination of participant in clinical trial    3. Secondary malignant neoplasm of bone    4. Anemia, chronic disease          Assessment and Plan:         1,2,3. Stage IV NSCLC:   -Diagnosed Aug 2019, PDL1 5%, Guardant 360 without targetable  mutations  -ECOG 0  -Previously received carbo-taxol with RT (grade 3 anaphylaxis to taxol) Aug-Oct 2019 then Keytruda single  agent until PET showed progression 9/10/20  - tissue sent for screening for lungMAP trial, allocated to the un-matched arm Ramucirumab + Keytruda vs. SOC. He has been randomized the experimental arm Ramucirumab + Keytruda. Patient still without symptoms and good PS. Initial 6 week re-staging scans with SD.    Ok to proceed with next cycle.      RTC per research RN, Jane.     The patient agrees with the plan, and all questions have been answered to their satisfaction.      More than 25 mins were spent during this encounter, greater than 50% was spent in direct counseling and/or coordination of care.     Jevon Key MD  Medical Oncology  [facility]

## 2020-12-30 NOTE — PROGRESS NOTES
Kashif Iverson  MRN # [id number]  [id number] trial  12/30/2020     Study ID# [id number]  [id number]     Randomized 11/10/2020 to ARM B: Ramucirumab + Pembrolizumab Q3W      Cycle 3/ Day 1  WEEK 7    Pt arrived to clinic this morning for MD appt and chemo infusion. Pt is accompanied by his wife and was in a pleasant mood. He reports he has been doing fine after treatments. Pt voiced his willingness to participate in trial.  Pt had labs (including research labs) and restaging CT Scan done yesterday    AE's reviewed:  Pt denies any fever, SOB, difficulty breathing, chest pain, headaches, and diarrhea. He is eating well and remains active.     Hypertension, GR2 (intermittent) - Pt has a h/o HTN; Pt BP in clinic this morning was 134/82. Pt remians compliant w/ BP med.   Allergic rhinitis, GR1- continues to report this as intermittent based on the weather; pt does not have complaints regarding this @ present time, remains NCS and no new orders given. Will cont to monitor.    Back Pain, GR1- ongoing; Dr Key queried pt about this and pt confirms this is intermittent; notices it more behind the shoulder blades and more frequently on the Rt side. He also voiced increased pain when he does get it. Dr Key aware, no new orders given.     Pt was seen and assessed per Dr Key; H&P done. Results of CT Scan done yesterday were reviewed by Dr Key. Per Dr Key pt has stable disease based on RECIST criteria. Scan results were reviewed w/ pt by Dr Key    VS stable: /82, P 80, T 99.0  WT: 113.4 kg (250 pds)  PS: 0    Lab results were reviewed by Dr Key and myself and all are w/in parameters for treatment today. Dose Modification section 8.4 & 8.5 were reviewed and pt required no dose mods.     Pt did have blood drawn yesterday morning for WEEK 7 timepoint (buffy coat and plasma). Blood was processed per protocol. Buffy coat and plasma were frozen and were sent out w/ batch shipment yesterday.    Treatment plan was entered  into [facility] and reviewed w/ Dr Key. Ramucirumab dose was calculated based off of today's wt. Orders were signed by Dr Key.     Chemotherapy infusion nurse Anjali Martin, RN educated on the order and duration of drug administration. Since pt has tolerated his first 2 cycles he no longer needs the hour observation after each drug. Infusion guidelines handout provided and instructed to call me for any infusion issues. She verbalized understanding.     Pt has appt to return to clinic in 3 weeks for Cycle 4. They are aware of these appts.   Pt has my contact information and was instructed to call us for any interim issues, concerns or questions they have between today and his next appt. They verbalized understanding.

## 2020-12-30 NOTE — NURSING
[id number] Trial cycle 3    1030-Pt admitted to chemotherapy infusion unit for Trial S8028G, ambulating independently, accompanied by wife. Jane Man, research RN, at chairside and explained study and gave study checklist to Anjali Martin, infusion RN. VS stable, see flow sheet. Labs, allergies, medications and history reviewed. Assessment performed, pt has no complaints. PIV obtained in right hand, + blood return, flushes easily, pt tolerated well. Pt reclined in chair, warm blanket, drink and snack offered. Will monitor.     1055-Pt pre-medicated with IV Benadryl 50 mg at this time.     1126-INV Ramucirumab over 1 hour administered at this time. VS stable, see flow sheet.    1226-INV Ramicirumab complete. VS taken and stable, see flow sheet. Pembrolizumab 200 mg over 30 minutes administered at this time.     1256-Pembrolizumab complete. VS taken and stable, see flow sheet.     Pt tolerated E4418P Trial well, with no complications or s/s adverse reaction. PIV removed, catheter tip intact, pt tolerated well. Pt discharged with NAD, ambulating independently, accompanied by wife. AVS printed and given to pt.

## 2020-12-30 NOTE — PLAN OF CARE
Pt got F2141E Trial cycle 3 today and tolerated well, with no complications or s/s adverse reaction (see nursing note). PIV + blood return, flushed with NS and removed prior to discharge. Catheter tip intact. RTC 1/20/2021 for labs, pt and wife verbalized understanding. Pt discharged with no distress noted, ambulating independently, accompanied by wife. AVS printed and given to pt.

## 2020-12-31 ENCOUNTER — PATIENT MESSAGE (OUTPATIENT)
Dept: ADMINISTRATIVE | Facility: OTHER | Age: 60
End: 2020-12-31

## 2021-01-01 ENCOUNTER — PATIENT MESSAGE (OUTPATIENT)
Dept: ADMINISTRATIVE | Facility: OTHER | Age: 61
End: 2021-01-01

## 2021-01-02 ENCOUNTER — PATIENT MESSAGE (OUTPATIENT)
Dept: ADMINISTRATIVE | Facility: OTHER | Age: 61
End: 2021-01-02

## 2021-01-03 ENCOUNTER — PATIENT MESSAGE (OUTPATIENT)
Dept: ADMINISTRATIVE | Facility: OTHER | Age: 61
End: 2021-01-03

## 2021-01-05 ENCOUNTER — PATIENT MESSAGE (OUTPATIENT)
Dept: ADMINISTRATIVE | Facility: OTHER | Age: 61
End: 2021-01-05

## 2021-01-06 ENCOUNTER — PATIENT MESSAGE (OUTPATIENT)
Dept: ADMINISTRATIVE | Facility: OTHER | Age: 61
End: 2021-01-06

## 2021-01-07 ENCOUNTER — PATIENT MESSAGE (OUTPATIENT)
Dept: ADMINISTRATIVE | Facility: OTHER | Age: 61
End: 2021-01-07

## 2021-01-08 ENCOUNTER — PATIENT MESSAGE (OUTPATIENT)
Dept: ADMINISTRATIVE | Facility: OTHER | Age: 61
End: 2021-01-08

## 2021-01-14 ENCOUNTER — TELEPHONE (OUTPATIENT)
Dept: HEMATOLOGY/ONCOLOGY | Facility: CLINIC | Age: 61
End: 2021-01-14

## 2021-01-20 ENCOUNTER — PATIENT MESSAGE (OUTPATIENT)
Dept: ADMINISTRATIVE | Facility: OTHER | Age: 61
End: 2021-01-20

## 2021-01-20 ENCOUNTER — LAB VISIT (OUTPATIENT)
Dept: LAB | Facility: HOSPITAL | Age: 61
End: 2021-01-20
Attending: INTERNAL MEDICINE
Payer: COMMERCIAL

## 2021-01-20 DIAGNOSIS — Z00.6 EXAMINATION OF PARTICIPANT IN CLINICAL TRIAL: ICD-10-CM

## 2021-01-20 DIAGNOSIS — C34.02 LUNG CANCER, MAIN BRONCHUS, LEFT: ICD-10-CM

## 2021-01-20 LAB
ALBUMIN SERPL BCP-MCNC: 3.6 G/DL (ref 3.5–5.2)
ALP SERPL-CCNC: 52 U/L (ref 55–135)
ALT SERPL W/O P-5'-P-CCNC: 17 U/L (ref 10–44)
ANION GAP SERPL CALC-SCNC: 8 MMOL/L (ref 8–16)
AST SERPL-CCNC: 16 U/L (ref 10–40)
BASOPHILS # BLD AUTO: 0.06 K/UL (ref 0–0.2)
BASOPHILS NFR BLD: 0.7 % (ref 0–1.9)
BILIRUB SERPL-MCNC: 0.3 MG/DL (ref 0.1–1)
BUN SERPL-MCNC: 8 MG/DL (ref 6–20)
CALCIUM SERPL-MCNC: 9.2 MG/DL (ref 8.7–10.5)
CHLORIDE SERPL-SCNC: 102 MMOL/L (ref 95–110)
CO2 SERPL-SCNC: 30 MMOL/L (ref 23–29)
CREAT SERPL-MCNC: 0.8 MG/DL (ref 0.5–1.4)
DIFFERENTIAL METHOD: ABNORMAL
EOSINOPHIL # BLD AUTO: 0.3 K/UL (ref 0–0.5)
EOSINOPHIL NFR BLD: 3.6 % (ref 0–8)
ERYTHROCYTE [DISTWIDTH] IN BLOOD BY AUTOMATED COUNT: 13.7 % (ref 11.5–14.5)
EST. GFR  (AFRICAN AMERICAN): >60 ML/MIN/1.73 M^2
EST. GFR  (NON AFRICAN AMERICAN): >60 ML/MIN/1.73 M^2
GLUCOSE SERPL-MCNC: 95 MG/DL (ref 70–110)
HCT VFR BLD AUTO: 43.9 % (ref 40–54)
HGB BLD-MCNC: 14.7 G/DL (ref 14–18)
IMM GRANULOCYTES # BLD AUTO: 0.03 K/UL (ref 0–0.04)
IMM GRANULOCYTES NFR BLD AUTO: 0.3 % (ref 0–0.5)
LYMPHOCYTES # BLD AUTO: 2.2 K/UL (ref 1–4.8)
LYMPHOCYTES NFR BLD: 24.9 % (ref 18–48)
MCH RBC QN AUTO: 31.1 PG (ref 27–31)
MCHC RBC AUTO-ENTMCNC: 33.5 G/DL (ref 32–36)
MCV RBC AUTO: 93 FL (ref 82–98)
MONOCYTES # BLD AUTO: 1 K/UL (ref 0.3–1)
MONOCYTES NFR BLD: 11 % (ref 4–15)
NEUTROPHILS # BLD AUTO: 5.3 K/UL (ref 1.8–7.7)
NEUTROPHILS NFR BLD: 59.5 % (ref 38–73)
NRBC BLD-RTO: 0 /100 WBC
PLATELET # BLD AUTO: 230 K/UL (ref 150–350)
PMV BLD AUTO: 9.7 FL (ref 9.2–12.9)
POTASSIUM SERPL-SCNC: 4.1 MMOL/L (ref 3.5–5.1)
PROT SERPL-MCNC: 7.3 G/DL (ref 6–8.4)
RBC # BLD AUTO: 4.73 M/UL (ref 4.6–6.2)
SODIUM SERPL-SCNC: 140 MMOL/L (ref 136–145)
T4 FREE SERPL-MCNC: 1.06 NG/DL (ref 0.71–1.51)
TSH SERPL DL<=0.005 MIU/L-ACNC: 0.89 UIU/ML (ref 0.4–4)
WBC # BLD AUTO: 8.93 K/UL (ref 3.9–12.7)

## 2021-01-20 PROCEDURE — 80053 COMPREHEN METABOLIC PANEL: CPT

## 2021-01-20 PROCEDURE — 84443 ASSAY THYROID STIM HORMONE: CPT

## 2021-01-20 PROCEDURE — 84439 ASSAY OF FREE THYROXINE: CPT

## 2021-01-20 PROCEDURE — 85025 COMPLETE CBC W/AUTO DIFF WBC: CPT

## 2021-01-20 PROCEDURE — 36415 COLL VENOUS BLD VENIPUNCTURE: CPT | Mod: PO

## 2021-01-21 ENCOUNTER — PATIENT MESSAGE (OUTPATIENT)
Dept: ADMINISTRATIVE | Facility: OTHER | Age: 61
End: 2021-01-21

## 2021-01-21 ENCOUNTER — TELEPHONE (OUTPATIENT)
Dept: HEMATOLOGY/ONCOLOGY | Facility: CLINIC | Age: 61
End: 2021-01-21

## 2021-01-21 ENCOUNTER — INFUSION (OUTPATIENT)
Dept: INFUSION THERAPY | Facility: HOSPITAL | Age: 61
End: 2021-01-21
Attending: INTERNAL MEDICINE
Payer: COMMERCIAL

## 2021-01-21 ENCOUNTER — OFFICE VISIT (OUTPATIENT)
Dept: HEMATOLOGY/ONCOLOGY | Facility: CLINIC | Age: 61
End: 2021-01-21
Payer: COMMERCIAL

## 2021-01-21 ENCOUNTER — LAB VISIT (OUTPATIENT)
Dept: LAB | Facility: HOSPITAL | Age: 61
End: 2021-01-21
Payer: COMMERCIAL

## 2021-01-21 ENCOUNTER — RESEARCH ENCOUNTER (OUTPATIENT)
Dept: HEMATOLOGY/ONCOLOGY | Facility: CLINIC | Age: 61
End: 2021-01-21

## 2021-01-21 VITALS
TEMPERATURE: 98 F | HEART RATE: 73 BPM | SYSTOLIC BLOOD PRESSURE: 132 MMHG | DIASTOLIC BLOOD PRESSURE: 78 MMHG | OXYGEN SATURATION: 97 % | RESPIRATION RATE: 18 BRPM

## 2021-01-21 VITALS
RESPIRATION RATE: 20 BRPM | WEIGHT: 248.69 LBS | OXYGEN SATURATION: 98 % | DIASTOLIC BLOOD PRESSURE: 81 MMHG | SYSTOLIC BLOOD PRESSURE: 143 MMHG | HEART RATE: 78 BPM | HEIGHT: 70 IN | TEMPERATURE: 99 F | BODY MASS INDEX: 35.6 KG/M2

## 2021-01-21 DIAGNOSIS — D63.8 ANEMIA, CHRONIC DISEASE: ICD-10-CM

## 2021-01-21 DIAGNOSIS — C79.51 SECONDARY MALIGNANT NEOPLASM OF BONE: ICD-10-CM

## 2021-01-21 DIAGNOSIS — C79.51 SECONDARY MALIGNANT NEOPLASM OF BONE: Primary | ICD-10-CM

## 2021-01-21 DIAGNOSIS — C34.02 LUNG CANCER, MAIN BRONCHUS, LEFT: Primary | ICD-10-CM

## 2021-01-21 DIAGNOSIS — Z00.6 EXAMINATION OF PARTICIPANT IN CLINICAL TRIAL: ICD-10-CM

## 2021-01-21 DIAGNOSIS — C34.02 LUNG CANCER, MAIN BRONCHUS, LEFT: ICD-10-CM

## 2021-01-21 DIAGNOSIS — I10 BENIGN ESSENTIAL HTN: ICD-10-CM

## 2021-01-21 LAB
DRUG STUDY SPECIMEN TYPE: NORMAL
DRUG STUDY TEST NAME: NORMAL
DRUG STUDY TEST RESULT: NORMAL

## 2021-01-21 PROCEDURE — 96413 CHEMO IV INFUSION 1 HR: CPT

## 2021-01-21 PROCEDURE — 99215 OFFICE O/P EST HI 40 MIN: CPT | Mod: S$GLB,,, | Performed by: INTERNAL MEDICINE

## 2021-01-21 PROCEDURE — 3074F SYST BP LT 130 MM HG: CPT | Mod: CPTII,S$GLB,, | Performed by: INTERNAL MEDICINE

## 2021-01-21 PROCEDURE — 99999 PR PBB SHADOW E&M-EST. PATIENT-LVL III: ICD-10-PCS | Mod: PBBFAC,,, | Performed by: INTERNAL MEDICINE

## 2021-01-21 PROCEDURE — 3008F PR BODY MASS INDEX (BMI) DOCUMENTED: ICD-10-PCS | Mod: CPTII,S$GLB,, | Performed by: INTERNAL MEDICINE

## 2021-01-21 PROCEDURE — 3079F PR MOST RECENT DIASTOLIC BLOOD PRESSURE 80-89 MM HG: ICD-10-PCS | Mod: CPTII,S$GLB,, | Performed by: INTERNAL MEDICINE

## 2021-01-21 PROCEDURE — 1126F AMNT PAIN NOTED NONE PRSNT: CPT | Mod: S$GLB,,, | Performed by: INTERNAL MEDICINE

## 2021-01-21 PROCEDURE — 99215 PR OFFICE/OUTPT VISIT, EST, LEVL V, 40-54 MIN: ICD-10-PCS | Mod: S$GLB,,, | Performed by: INTERNAL MEDICINE

## 2021-01-21 PROCEDURE — 36415 COLL VENOUS BLD VENIPUNCTURE: CPT

## 2021-01-21 PROCEDURE — 96417 CHEMO IV INFUS EACH ADDL SEQ: CPT

## 2021-01-21 PROCEDURE — 3008F BODY MASS INDEX DOCD: CPT | Mod: CPTII,S$GLB,, | Performed by: INTERNAL MEDICINE

## 2021-01-21 PROCEDURE — 3074F PR MOST RECENT SYSTOLIC BLOOD PRESSURE < 130 MM HG: ICD-10-PCS | Mod: CPTII,S$GLB,, | Performed by: INTERNAL MEDICINE

## 2021-01-21 PROCEDURE — 3079F DIAST BP 80-89 MM HG: CPT | Mod: CPTII,S$GLB,, | Performed by: INTERNAL MEDICINE

## 2021-01-21 PROCEDURE — 25000003 PHARM REV CODE 250: Performed by: INTERNAL MEDICINE

## 2021-01-21 PROCEDURE — 96367 TX/PROPH/DG ADDL SEQ IV INF: CPT

## 2021-01-21 PROCEDURE — 63600175 PHARM REV CODE 636 W HCPCS: Performed by: INTERNAL MEDICINE

## 2021-01-21 PROCEDURE — 1126F PR PAIN SEVERITY QUANTIFIED, NO PAIN PRESENT: ICD-10-PCS | Mod: S$GLB,,, | Performed by: INTERNAL MEDICINE

## 2021-01-21 PROCEDURE — 99999 PR PBB SHADOW E&M-EST. PATIENT-LVL III: CPT | Mod: PBBFAC,,, | Performed by: INTERNAL MEDICINE

## 2021-01-21 PROCEDURE — 99000 SPECIMEN HANDLING OFFICE-LAB: CPT

## 2021-01-21 RX ORDER — ATENOLOL 50 MG/1
50 TABLET ORAL DAILY
Qty: 30 TABLET | Refills: 11 | Status: SHIPPED | OUTPATIENT
Start: 2021-01-21 | End: 2022-01-28 | Stop reason: SDUPTHER

## 2021-01-21 RX ORDER — DIPHENHYDRAMINE HYDROCHLORIDE 50 MG/ML
50 INJECTION INTRAMUSCULAR; INTRAVENOUS ONCE AS NEEDED
Status: DISCONTINUED | OUTPATIENT
Start: 2021-01-21 | End: 2021-01-21 | Stop reason: HOSPADM

## 2021-01-21 RX ORDER — EPINEPHRINE 0.3 MG/.3ML
0.3 INJECTION SUBCUTANEOUS ONCE AS NEEDED
Status: CANCELLED | OUTPATIENT
Start: 2021-01-21

## 2021-01-21 RX ORDER — HEPARIN 100 UNIT/ML
500 SYRINGE INTRAVENOUS
Status: CANCELLED | OUTPATIENT
Start: 2021-01-21

## 2021-01-21 RX ORDER — SODIUM CHLORIDE 0.9 % (FLUSH) 0.9 %
10 SYRINGE (ML) INJECTION
Status: CANCELLED | OUTPATIENT
Start: 2021-01-21

## 2021-01-21 RX ORDER — HEPARIN 100 UNIT/ML
500 SYRINGE INTRAVENOUS
Status: DISCONTINUED | OUTPATIENT
Start: 2021-01-21 | End: 2021-01-21 | Stop reason: HOSPADM

## 2021-01-21 RX ORDER — DIPHENHYDRAMINE HYDROCHLORIDE 50 MG/ML
50 INJECTION INTRAMUSCULAR; INTRAVENOUS ONCE AS NEEDED
Status: CANCELLED | OUTPATIENT
Start: 2021-01-21

## 2021-01-21 RX ORDER — EPINEPHRINE 0.3 MG/.3ML
0.3 INJECTION SUBCUTANEOUS ONCE AS NEEDED
Status: DISCONTINUED | OUTPATIENT
Start: 2021-01-21 | End: 2021-01-21 | Stop reason: HOSPADM

## 2021-01-21 RX ORDER — SODIUM CHLORIDE 0.9 % (FLUSH) 0.9 %
10 SYRINGE (ML) INJECTION
Status: DISCONTINUED | OUTPATIENT
Start: 2021-01-21 | End: 2021-01-21 | Stop reason: HOSPADM

## 2021-01-21 RX ADMIN — DIPHENHYDRAMINE HYDROCHLORIDE 50 MG: 50 INJECTION, SOLUTION INTRAMUSCULAR; INTRAVENOUS at 11:01

## 2021-01-21 RX ADMIN — SODIUM CHLORIDE: 9 INJECTION, SOLUTION INTRAVENOUS at 12:01

## 2021-01-21 RX ADMIN — SODIUM CHLORIDE 1128 MG: 0.9 INJECTION, SOLUTION INTRAVENOUS at 12:01

## 2021-01-26 ENCOUNTER — PATIENT MESSAGE (OUTPATIENT)
Dept: ADMINISTRATIVE | Facility: OTHER | Age: 61
End: 2021-01-26

## 2021-01-27 ENCOUNTER — PATIENT MESSAGE (OUTPATIENT)
Dept: ADMINISTRATIVE | Facility: OTHER | Age: 61
End: 2021-01-27

## 2021-01-28 ENCOUNTER — PATIENT MESSAGE (OUTPATIENT)
Dept: ADMINISTRATIVE | Facility: OTHER | Age: 61
End: 2021-01-28

## 2021-01-29 ENCOUNTER — PATIENT MESSAGE (OUTPATIENT)
Dept: ADMINISTRATIVE | Facility: OTHER | Age: 61
End: 2021-01-29

## 2021-01-30 ENCOUNTER — PATIENT MESSAGE (OUTPATIENT)
Dept: ADMINISTRATIVE | Facility: OTHER | Age: 61
End: 2021-01-30

## 2021-01-31 ENCOUNTER — PATIENT MESSAGE (OUTPATIENT)
Dept: ADMINISTRATIVE | Facility: OTHER | Age: 61
End: 2021-01-31

## 2021-02-01 ENCOUNTER — PATIENT MESSAGE (OUTPATIENT)
Dept: ADMINISTRATIVE | Facility: OTHER | Age: 61
End: 2021-02-01

## 2021-02-02 ENCOUNTER — PATIENT MESSAGE (OUTPATIENT)
Dept: ADMINISTRATIVE | Facility: OTHER | Age: 61
End: 2021-02-02

## 2021-02-03 ENCOUNTER — PATIENT MESSAGE (OUTPATIENT)
Dept: ADMINISTRATIVE | Facility: OTHER | Age: 61
End: 2021-02-03

## 2021-02-03 ENCOUNTER — PATIENT MESSAGE (OUTPATIENT)
Dept: HEMATOLOGY/ONCOLOGY | Facility: CLINIC | Age: 61
End: 2021-02-03

## 2021-02-04 ENCOUNTER — PATIENT MESSAGE (OUTPATIENT)
Dept: ADMINISTRATIVE | Facility: OTHER | Age: 61
End: 2021-02-04

## 2021-02-05 ENCOUNTER — PATIENT MESSAGE (OUTPATIENT)
Dept: ADMINISTRATIVE | Facility: OTHER | Age: 61
End: 2021-02-05

## 2021-02-06 ENCOUNTER — PATIENT MESSAGE (OUTPATIENT)
Dept: ADMINISTRATIVE | Facility: OTHER | Age: 61
End: 2021-02-06

## 2021-02-07 ENCOUNTER — PATIENT MESSAGE (OUTPATIENT)
Dept: ADMINISTRATIVE | Facility: OTHER | Age: 61
End: 2021-02-07

## 2021-02-08 ENCOUNTER — PATIENT MESSAGE (OUTPATIENT)
Dept: ADMINISTRATIVE | Facility: OTHER | Age: 61
End: 2021-02-08

## 2021-02-09 ENCOUNTER — PATIENT MESSAGE (OUTPATIENT)
Dept: ADMINISTRATIVE | Facility: OTHER | Age: 61
End: 2021-02-09

## 2021-02-10 ENCOUNTER — HOSPITAL ENCOUNTER (OUTPATIENT)
Dept: RADIOLOGY | Facility: HOSPITAL | Age: 61
Discharge: HOME | End: 2021-02-10
Attending: INTERNAL MEDICINE
Payer: COMMERCIAL

## 2021-02-10 ENCOUNTER — PATIENT MESSAGE (OUTPATIENT)
Dept: ADMINISTRATIVE | Facility: OTHER | Age: 61
End: 2021-02-10

## 2021-02-10 DIAGNOSIS — Z00.6 EXAMINATION OF PARTICIPANT IN CLINICAL TRIAL: ICD-10-CM

## 2021-02-10 DIAGNOSIS — C34.02 LUNG CANCER, MAIN BRONCHUS, LEFT: ICD-10-CM

## 2021-02-10 PROCEDURE — 74177 CT ABD & PELVIS W/CONTRAST: CPT | Mod: TC

## 2021-02-10 PROCEDURE — 74177 CT ABD & PELVIS W/CONTRAST: CPT | Mod: 26,,, | Performed by: RADIOLOGY

## 2021-02-10 PROCEDURE — 74177 CT CHEST ABDOMEN PELVIS WITH CONTRAST (XPD): ICD-10-PCS | Mod: 26,,, | Performed by: RADIOLOGY

## 2021-02-10 PROCEDURE — 71260 CT CHEST ABDOMEN PELVIS WITH CONTRAST (XPD): ICD-10-PCS | Mod: 26,,, | Performed by: RADIOLOGY

## 2021-02-10 PROCEDURE — 25500020 PHARM REV CODE 255: Performed by: INTERNAL MEDICINE

## 2021-02-10 PROCEDURE — 71260 CT THORAX DX C+: CPT | Mod: 26,,, | Performed by: RADIOLOGY

## 2021-02-10 RX ADMIN — IOHEXOL 15 ML: 350 INJECTION, SOLUTION INTRAVENOUS at 08:02

## 2021-02-10 RX ADMIN — IOHEXOL 100 ML: 350 INJECTION, SOLUTION INTRAVENOUS at 10:02

## 2021-02-11 ENCOUNTER — LAB VISIT (OUTPATIENT)
Dept: LAB | Facility: HOSPITAL | Age: 61
End: 2021-02-11
Attending: INTERNAL MEDICINE
Payer: COMMERCIAL

## 2021-02-11 ENCOUNTER — OFFICE VISIT (OUTPATIENT)
Dept: HEMATOLOGY/ONCOLOGY | Facility: CLINIC | Age: 61
End: 2021-02-11
Payer: COMMERCIAL

## 2021-02-11 ENCOUNTER — PATIENT MESSAGE (OUTPATIENT)
Dept: ADMINISTRATIVE | Facility: OTHER | Age: 61
End: 2021-02-11

## 2021-02-11 ENCOUNTER — RESEARCH ENCOUNTER (OUTPATIENT)
Dept: HEMATOLOGY/ONCOLOGY | Facility: CLINIC | Age: 61
End: 2021-02-11

## 2021-02-11 ENCOUNTER — PATIENT MESSAGE (OUTPATIENT)
Dept: HEMATOLOGY/ONCOLOGY | Facility: CLINIC | Age: 61
End: 2021-02-11

## 2021-02-11 VITALS
TEMPERATURE: 98 F | HEART RATE: 72 BPM | RESPIRATION RATE: 18 BRPM | OXYGEN SATURATION: 96 % | SYSTOLIC BLOOD PRESSURE: 147 MMHG | BODY MASS INDEX: 34.9 KG/M2 | HEIGHT: 70 IN | DIASTOLIC BLOOD PRESSURE: 88 MMHG | WEIGHT: 243.81 LBS

## 2021-02-11 DIAGNOSIS — C34.02 LUNG CANCER, MAIN BRONCHUS, LEFT: ICD-10-CM

## 2021-02-11 DIAGNOSIS — Z00.6 EXAMINATION OF PARTICIPANT IN CLINICAL TRIAL: ICD-10-CM

## 2021-02-11 DIAGNOSIS — D63.8 ANEMIA, CHRONIC DISEASE: ICD-10-CM

## 2021-02-11 DIAGNOSIS — C34.02 LUNG CANCER, MAIN BRONCHUS, LEFT: Primary | ICD-10-CM

## 2021-02-11 DIAGNOSIS — I10 BENIGN ESSENTIAL HTN: ICD-10-CM

## 2021-02-11 DIAGNOSIS — C79.51 SECONDARY MALIGNANT NEOPLASM OF BONE: ICD-10-CM

## 2021-02-11 LAB
DRUG STUDY SPECIMEN TYPE: NORMAL
DRUG STUDY TEST NAME: NORMAL
DRUG STUDY TEST RESULT: NORMAL

## 2021-02-11 PROCEDURE — 99215 PR OFFICE/OUTPT VISIT, EST, LEVL V, 40-54 MIN: ICD-10-PCS | Mod: S$GLB,,, | Performed by: INTERNAL MEDICINE

## 2021-02-11 PROCEDURE — 99000 SPECIMEN HANDLING OFFICE-LAB: CPT

## 2021-02-11 PROCEDURE — 3079F DIAST BP 80-89 MM HG: CPT | Mod: CPTII,S$GLB,, | Performed by: INTERNAL MEDICINE

## 2021-02-11 PROCEDURE — 99999 PR PBB SHADOW E&M-EST. PATIENT-LVL III: ICD-10-PCS | Mod: PBBFAC,,, | Performed by: INTERNAL MEDICINE

## 2021-02-11 PROCEDURE — 3008F PR BODY MASS INDEX (BMI) DOCUMENTED: ICD-10-PCS | Mod: CPTII,S$GLB,, | Performed by: INTERNAL MEDICINE

## 2021-02-11 PROCEDURE — 1126F PR PAIN SEVERITY QUANTIFIED, NO PAIN PRESENT: ICD-10-PCS | Mod: S$GLB,,, | Performed by: INTERNAL MEDICINE

## 2021-02-11 PROCEDURE — 3077F PR MOST RECENT SYSTOLIC BLOOD PRESSURE >= 140 MM HG: ICD-10-PCS | Mod: CPTII,S$GLB,, | Performed by: INTERNAL MEDICINE

## 2021-02-11 PROCEDURE — 1126F AMNT PAIN NOTED NONE PRSNT: CPT | Mod: S$GLB,,, | Performed by: INTERNAL MEDICINE

## 2021-02-11 PROCEDURE — 3079F PR MOST RECENT DIASTOLIC BLOOD PRESSURE 80-89 MM HG: ICD-10-PCS | Mod: CPTII,S$GLB,, | Performed by: INTERNAL MEDICINE

## 2021-02-11 PROCEDURE — 3077F SYST BP >= 140 MM HG: CPT | Mod: CPTII,S$GLB,, | Performed by: INTERNAL MEDICINE

## 2021-02-11 PROCEDURE — 99215 OFFICE O/P EST HI 40 MIN: CPT | Mod: S$GLB,,, | Performed by: INTERNAL MEDICINE

## 2021-02-11 PROCEDURE — 36415 COLL VENOUS BLD VENIPUNCTURE: CPT

## 2021-02-11 PROCEDURE — 99999 PR PBB SHADOW E&M-EST. PATIENT-LVL III: CPT | Mod: PBBFAC,,, | Performed by: INTERNAL MEDICINE

## 2021-02-11 PROCEDURE — 3008F BODY MASS INDEX DOCD: CPT | Mod: CPTII,S$GLB,, | Performed by: INTERNAL MEDICINE

## 2021-02-12 ENCOUNTER — PATIENT MESSAGE (OUTPATIENT)
Dept: ADMINISTRATIVE | Facility: OTHER | Age: 61
End: 2021-02-12

## 2021-02-13 ENCOUNTER — PATIENT MESSAGE (OUTPATIENT)
Dept: ADMINISTRATIVE | Facility: OTHER | Age: 61
End: 2021-02-13

## 2021-02-15 ENCOUNTER — PATIENT MESSAGE (OUTPATIENT)
Dept: ADMINISTRATIVE | Facility: OTHER | Age: 61
End: 2021-02-15

## 2021-02-17 ENCOUNTER — OFFICE VISIT (OUTPATIENT)
Dept: HEMATOLOGY/ONCOLOGY | Facility: CLINIC | Age: 61
End: 2021-02-17
Payer: COMMERCIAL

## 2021-02-17 ENCOUNTER — PATIENT MESSAGE (OUTPATIENT)
Dept: ADMINISTRATIVE | Facility: OTHER | Age: 61
End: 2021-02-17

## 2021-02-17 VITALS
WEIGHT: 246.06 LBS | HEIGHT: 70 IN | SYSTOLIC BLOOD PRESSURE: 140 MMHG | DIASTOLIC BLOOD PRESSURE: 90 MMHG | OXYGEN SATURATION: 98 % | HEART RATE: 98 BPM | BODY MASS INDEX: 35.23 KG/M2 | TEMPERATURE: 99 F

## 2021-02-17 DIAGNOSIS — C34.02 LUNG CANCER, MAIN BRONCHUS, LEFT: Primary | ICD-10-CM

## 2021-02-17 DIAGNOSIS — R91.8 MULTIPLE PULMONARY NODULES: ICD-10-CM

## 2021-02-17 PROCEDURE — 1126F AMNT PAIN NOTED NONE PRSNT: CPT | Mod: S$GLB,,, | Performed by: INTERNAL MEDICINE

## 2021-02-17 PROCEDURE — 3077F PR MOST RECENT SYSTOLIC BLOOD PRESSURE >= 140 MM HG: ICD-10-PCS | Mod: CPTII,S$GLB,, | Performed by: INTERNAL MEDICINE

## 2021-02-17 PROCEDURE — 99999 PR PBB SHADOW E&M-EST. PATIENT-LVL III: CPT | Mod: PBBFAC,,, | Performed by: INTERNAL MEDICINE

## 2021-02-17 PROCEDURE — 3008F PR BODY MASS INDEX (BMI) DOCUMENTED: ICD-10-PCS | Mod: CPTII,S$GLB,, | Performed by: INTERNAL MEDICINE

## 2021-02-17 PROCEDURE — 3080F DIAST BP >= 90 MM HG: CPT | Mod: CPTII,S$GLB,, | Performed by: INTERNAL MEDICINE

## 2021-02-17 PROCEDURE — 99215 PR OFFICE/OUTPT VISIT, EST, LEVL V, 40-54 MIN: ICD-10-PCS | Mod: S$GLB,,, | Performed by: INTERNAL MEDICINE

## 2021-02-17 PROCEDURE — 1126F PR PAIN SEVERITY QUANTIFIED, NO PAIN PRESENT: ICD-10-PCS | Mod: S$GLB,,, | Performed by: INTERNAL MEDICINE

## 2021-02-17 PROCEDURE — 99215 OFFICE O/P EST HI 40 MIN: CPT | Mod: S$GLB,,, | Performed by: INTERNAL MEDICINE

## 2021-02-17 PROCEDURE — 3008F BODY MASS INDEX DOCD: CPT | Mod: CPTII,S$GLB,, | Performed by: INTERNAL MEDICINE

## 2021-02-17 PROCEDURE — 3077F SYST BP >= 140 MM HG: CPT | Mod: CPTII,S$GLB,, | Performed by: INTERNAL MEDICINE

## 2021-02-17 PROCEDURE — 99999 PR PBB SHADOW E&M-EST. PATIENT-LVL III: ICD-10-PCS | Mod: PBBFAC,,, | Performed by: INTERNAL MEDICINE

## 2021-02-17 PROCEDURE — 3080F PR MOST RECENT DIASTOLIC BLOOD PRESSURE >= 90 MM HG: ICD-10-PCS | Mod: CPTII,S$GLB,, | Performed by: INTERNAL MEDICINE

## 2021-02-20 ENCOUNTER — PATIENT MESSAGE (OUTPATIENT)
Dept: HEMATOLOGY/ONCOLOGY | Facility: CLINIC | Age: 61
End: 2021-02-20

## 2021-02-20 ENCOUNTER — PATIENT MESSAGE (OUTPATIENT)
Dept: ADMINISTRATIVE | Facility: OTHER | Age: 61
End: 2021-02-20

## 2021-02-24 ENCOUNTER — PATIENT MESSAGE (OUTPATIENT)
Dept: ADMINISTRATIVE | Facility: OTHER | Age: 61
End: 2021-02-24

## 2021-02-25 ENCOUNTER — INFUSION (OUTPATIENT)
Dept: INFUSION THERAPY | Facility: HOSPITAL | Age: 61
End: 2021-02-25
Attending: INTERNAL MEDICINE
Payer: COMMERCIAL

## 2021-02-25 VITALS
OXYGEN SATURATION: 97 % | DIASTOLIC BLOOD PRESSURE: 77 MMHG | RESPIRATION RATE: 17 BRPM | TEMPERATURE: 99 F | SYSTOLIC BLOOD PRESSURE: 130 MMHG | HEART RATE: 66 BPM

## 2021-02-25 DIAGNOSIS — C34.02 LUNG CANCER, MAIN BRONCHUS, LEFT: Primary | ICD-10-CM

## 2021-02-25 DIAGNOSIS — C34.02 LUNG CANCER, MAIN BRONCHUS, LEFT: ICD-10-CM

## 2021-02-25 DIAGNOSIS — C79.51 SECONDARY MALIGNANT NEOPLASM OF BONE: Primary | ICD-10-CM

## 2021-02-25 DIAGNOSIS — R91.8 MULTIPLE PULMONARY NODULES: ICD-10-CM

## 2021-02-25 DIAGNOSIS — Z09 CHEMOTHERAPY FOLLOW-UP EXAMINATION: ICD-10-CM

## 2021-02-25 PROCEDURE — 63600175 PHARM REV CODE 636 W HCPCS: Performed by: INTERNAL MEDICINE

## 2021-02-25 PROCEDURE — 96375 TX/PRO/DX INJ NEW DRUG ADDON: CPT

## 2021-02-25 PROCEDURE — 96413 CHEMO IV INFUSION 1 HR: CPT

## 2021-02-25 PROCEDURE — 25000003 PHARM REV CODE 250: Performed by: INTERNAL MEDICINE

## 2021-02-25 RX ORDER — ONDANSETRON 2 MG/ML
8 INJECTION INTRAMUSCULAR; INTRAVENOUS
Status: CANCELLED | OUTPATIENT
Start: 2021-03-04

## 2021-02-25 RX ORDER — SODIUM CHLORIDE 0.9 % (FLUSH) 0.9 %
10 SYRINGE (ML) INJECTION
Status: CANCELLED | OUTPATIENT
Start: 2021-03-04

## 2021-02-25 RX ORDER — SODIUM CHLORIDE 0.9 % (FLUSH) 0.9 %
10 SYRINGE (ML) INJECTION
Status: CANCELLED | OUTPATIENT
Start: 2021-02-25

## 2021-02-25 RX ORDER — ONDANSETRON 4 MG/1
4 TABLET, FILM COATED ORAL EVERY 8 HOURS PRN
Qty: 30 TABLET | Refills: 1 | Status: SHIPPED | OUTPATIENT
Start: 2021-02-25 | End: 2021-08-17

## 2021-02-25 RX ORDER — ONDANSETRON 2 MG/ML
8 INJECTION INTRAMUSCULAR; INTRAVENOUS
Status: COMPLETED | OUTPATIENT
Start: 2021-02-25 | End: 2021-02-25

## 2021-02-25 RX ORDER — HEPARIN 100 UNIT/ML
500 SYRINGE INTRAVENOUS
Status: CANCELLED | OUTPATIENT
Start: 2021-03-04

## 2021-02-25 RX ORDER — ONDANSETRON 2 MG/ML
8 INJECTION INTRAMUSCULAR; INTRAVENOUS
Status: CANCELLED | OUTPATIENT
Start: 2021-02-25

## 2021-02-25 RX ORDER — HEPARIN 100 UNIT/ML
500 SYRINGE INTRAVENOUS
Status: CANCELLED | OUTPATIENT
Start: 2021-02-25

## 2021-02-25 RX ADMIN — GEMCITABINE HYDROCHLORIDE 2940 MG: 1 INJECTION, POWDER, LYOPHILIZED, FOR SOLUTION INTRAVENOUS at 02:02

## 2021-02-25 RX ADMIN — ONDANSETRON HYDROCHLORIDE 8 MG: 2 SOLUTION INTRAMUSCULAR; INTRAVENOUS at 01:02

## 2021-02-26 ENCOUNTER — PATIENT MESSAGE (OUTPATIENT)
Dept: ADMINISTRATIVE | Facility: OTHER | Age: 61
End: 2021-02-26

## 2021-03-03 ENCOUNTER — LAB VISIT (OUTPATIENT)
Dept: LAB | Facility: HOSPITAL | Age: 61
End: 2021-03-03
Attending: INTERNAL MEDICINE
Payer: COMMERCIAL

## 2021-03-03 DIAGNOSIS — R91.8 MULTIPLE PULMONARY NODULES: ICD-10-CM

## 2021-03-03 DIAGNOSIS — C34.02 LUNG CANCER, MAIN BRONCHUS, LEFT: ICD-10-CM

## 2021-03-03 LAB
ALBUMIN SERPL BCP-MCNC: 3.5 G/DL (ref 3.5–5.2)
ALP SERPL-CCNC: 66 U/L (ref 55–135)
ALT SERPL W/O P-5'-P-CCNC: 16 U/L (ref 10–44)
ANION GAP SERPL CALC-SCNC: 8 MMOL/L (ref 8–16)
AST SERPL-CCNC: 17 U/L (ref 10–40)
BASOPHILS # BLD AUTO: 0.09 K/UL (ref 0–0.2)
BASOPHILS NFR BLD: 1.4 % (ref 0–1.9)
BILIRUB SERPL-MCNC: 0.3 MG/DL (ref 0.1–1)
BUN SERPL-MCNC: 10 MG/DL (ref 6–20)
CALCIUM SERPL-MCNC: 9.1 MG/DL (ref 8.7–10.5)
CHLORIDE SERPL-SCNC: 101 MMOL/L (ref 95–110)
CO2 SERPL-SCNC: 28 MMOL/L (ref 23–29)
CREAT SERPL-MCNC: 0.8 MG/DL (ref 0.5–1.4)
DIFFERENTIAL METHOD: ABNORMAL
EOSINOPHIL # BLD AUTO: 0.3 K/UL (ref 0–0.5)
EOSINOPHIL NFR BLD: 4.4 % (ref 0–8)
ERYTHROCYTE [DISTWIDTH] IN BLOOD BY AUTOMATED COUNT: 12.8 % (ref 11.5–14.5)
EST. GFR  (AFRICAN AMERICAN): >60 ML/MIN/1.73 M^2
EST. GFR  (NON AFRICAN AMERICAN): >60 ML/MIN/1.73 M^2
GLUCOSE SERPL-MCNC: 125 MG/DL (ref 70–110)
HCT VFR BLD AUTO: 39.7 % (ref 40–54)
HGB BLD-MCNC: 13.3 G/DL (ref 14–18)
IMM GRANULOCYTES # BLD AUTO: 0.04 K/UL (ref 0–0.04)
IMM GRANULOCYTES NFR BLD AUTO: 0.6 % (ref 0–0.5)
LYMPHOCYTES # BLD AUTO: 1.5 K/UL (ref 1–4.8)
LYMPHOCYTES NFR BLD: 23 % (ref 18–48)
MCH RBC QN AUTO: 31.3 PG (ref 27–31)
MCHC RBC AUTO-ENTMCNC: 33.5 G/DL (ref 32–36)
MCV RBC AUTO: 93 FL (ref 82–98)
MONOCYTES # BLD AUTO: 0.6 K/UL (ref 0.3–1)
MONOCYTES NFR BLD: 9.1 % (ref 4–15)
NEUTROPHILS # BLD AUTO: 3.9 K/UL (ref 1.8–7.7)
NEUTROPHILS NFR BLD: 61.5 % (ref 38–73)
NRBC BLD-RTO: 0 /100 WBC
PLATELET # BLD AUTO: 157 K/UL (ref 150–350)
PMV BLD AUTO: 9.1 FL (ref 9.2–12.9)
POTASSIUM SERPL-SCNC: 3.8 MMOL/L (ref 3.5–5.1)
PROT SERPL-MCNC: 7.7 G/DL (ref 6–8.4)
RBC # BLD AUTO: 4.25 M/UL (ref 4.6–6.2)
SODIUM SERPL-SCNC: 137 MMOL/L (ref 136–145)
WBC # BLD AUTO: 6.38 K/UL (ref 3.9–12.7)

## 2021-03-03 PROCEDURE — 36415 COLL VENOUS BLD VENIPUNCTURE: CPT | Performed by: INTERNAL MEDICINE

## 2021-03-03 PROCEDURE — 85025 COMPLETE CBC W/AUTO DIFF WBC: CPT | Performed by: INTERNAL MEDICINE

## 2021-03-03 PROCEDURE — 80053 COMPREHEN METABOLIC PANEL: CPT | Performed by: INTERNAL MEDICINE

## 2021-03-04 ENCOUNTER — INFUSION (OUTPATIENT)
Dept: INFUSION THERAPY | Facility: HOSPITAL | Age: 61
End: 2021-03-04
Attending: INTERNAL MEDICINE

## 2021-03-04 VITALS
RESPIRATION RATE: 16 BRPM | OXYGEN SATURATION: 98 % | HEART RATE: 68 BPM | DIASTOLIC BLOOD PRESSURE: 72 MMHG | SYSTOLIC BLOOD PRESSURE: 119 MMHG | TEMPERATURE: 98 F

## 2021-03-04 DIAGNOSIS — R91.8 MULTIPLE PULMONARY NODULES: ICD-10-CM

## 2021-03-04 DIAGNOSIS — C34.02 LUNG CANCER, MAIN BRONCHUS, LEFT: ICD-10-CM

## 2021-03-04 DIAGNOSIS — C79.51 SECONDARY MALIGNANT NEOPLASM OF BONE: Primary | ICD-10-CM

## 2021-03-04 PROCEDURE — 96372 THER/PROPH/DIAG INJ SC/IM: CPT | Mod: 59

## 2021-03-04 PROCEDURE — 25000003 PHARM REV CODE 250: Performed by: INTERNAL MEDICINE

## 2021-03-04 PROCEDURE — 63600175 PHARM REV CODE 636 W HCPCS: Performed by: INTERNAL MEDICINE

## 2021-03-04 PROCEDURE — 96413 CHEMO IV INFUSION 1 HR: CPT

## 2021-03-04 RX ORDER — ONDANSETRON 2 MG/ML
8 INJECTION INTRAMUSCULAR; INTRAVENOUS
Status: COMPLETED | OUTPATIENT
Start: 2021-03-04 | End: 2021-03-04

## 2021-03-04 RX ADMIN — GEMCITABINE 2940 MG: 38 INJECTION, POWDER, LYOPHILIZED, FOR SOLUTION INTRAVENOUS at 01:03

## 2021-03-04 RX ADMIN — ONDANSETRON HYDROCHLORIDE 8 MG: 2 SOLUTION INTRAMUSCULAR; INTRAVENOUS at 01:03

## 2021-03-05 ENCOUNTER — PATIENT MESSAGE (OUTPATIENT)
Dept: HEMATOLOGY/ONCOLOGY | Facility: CLINIC | Age: 61
End: 2021-03-05

## 2021-03-13 ENCOUNTER — PATIENT MESSAGE (OUTPATIENT)
Dept: ADMINISTRATIVE | Facility: OTHER | Age: 61
End: 2021-03-13

## 2021-03-16 ENCOUNTER — PATIENT MESSAGE (OUTPATIENT)
Dept: ADMINISTRATIVE | Facility: OTHER | Age: 61
End: 2021-03-16

## 2021-03-17 ENCOUNTER — OFFICE VISIT (OUTPATIENT)
Dept: HEMATOLOGY/ONCOLOGY | Facility: CLINIC | Age: 61
End: 2021-03-17
Payer: COMMERCIAL

## 2021-03-17 ENCOUNTER — LAB VISIT (OUTPATIENT)
Dept: LAB | Facility: HOSPITAL | Age: 61
End: 2021-03-17
Attending: INTERNAL MEDICINE
Payer: COMMERCIAL

## 2021-03-17 ENCOUNTER — TELEPHONE (OUTPATIENT)
Dept: FAMILY MEDICINE | Facility: CLINIC | Age: 61
End: 2021-03-17

## 2021-03-17 ENCOUNTER — PATIENT MESSAGE (OUTPATIENT)
Dept: FAMILY MEDICINE | Facility: CLINIC | Age: 61
End: 2021-03-17

## 2021-03-17 VITALS
OXYGEN SATURATION: 99 % | BODY MASS INDEX: 33.96 KG/M2 | DIASTOLIC BLOOD PRESSURE: 75 MMHG | TEMPERATURE: 98 F | HEART RATE: 71 BPM | SYSTOLIC BLOOD PRESSURE: 132 MMHG | HEIGHT: 70 IN | WEIGHT: 237.19 LBS

## 2021-03-17 DIAGNOSIS — C34.02 LUNG CANCER, MAIN BRONCHUS, LEFT: Primary | ICD-10-CM

## 2021-03-17 DIAGNOSIS — C79.51 SECONDARY MALIGNANT NEOPLASM OF BONE: ICD-10-CM

## 2021-03-17 DIAGNOSIS — R91.8 MULTIPLE PULMONARY NODULES: ICD-10-CM

## 2021-03-17 DIAGNOSIS — Z09 CHEMOTHERAPY FOLLOW-UP EXAMINATION: ICD-10-CM

## 2021-03-17 LAB
ALBUMIN SERPL BCP-MCNC: 3.6 G/DL (ref 3.5–5.2)
ALP SERPL-CCNC: 54 U/L (ref 55–135)
ALT SERPL W/O P-5'-P-CCNC: 149 U/L (ref 10–44)
ANION GAP SERPL CALC-SCNC: 8 MMOL/L (ref 8–16)
AST SERPL-CCNC: 88 U/L (ref 10–40)
BILIRUB SERPL-MCNC: 0.3 MG/DL (ref 0.1–1)
BUN SERPL-MCNC: 7 MG/DL (ref 6–20)
CALCIUM SERPL-MCNC: 8.8 MG/DL (ref 8.7–10.5)
CHLORIDE SERPL-SCNC: 105 MMOL/L (ref 95–110)
CO2 SERPL-SCNC: 28 MMOL/L (ref 23–29)
CREAT SERPL-MCNC: 0.8 MG/DL (ref 0.5–1.4)
ERYTHROCYTE [DISTWIDTH] IN BLOOD BY AUTOMATED COUNT: 14.1 % (ref 11.5–14.5)
EST. GFR  (AFRICAN AMERICAN): >60 ML/MIN/1.73 M^2
EST. GFR  (NON AFRICAN AMERICAN): >60 ML/MIN/1.73 M^2
GLUCOSE SERPL-MCNC: 123 MG/DL (ref 70–110)
HCT VFR BLD AUTO: 39.2 % (ref 40–54)
HGB BLD-MCNC: 13.1 G/DL (ref 14–18)
IMM GRANULOCYTES # BLD AUTO: 0.08 K/UL (ref 0–0.04)
MCH RBC QN AUTO: 32 PG (ref 27–31)
MCHC RBC AUTO-ENTMCNC: 33.4 G/DL (ref 32–36)
MCV RBC AUTO: 96 FL (ref 82–98)
NEUTROPHILS # BLD AUTO: 4.8 K/UL (ref 1.8–7.7)
PLATELET # BLD AUTO: 335 K/UL (ref 150–350)
PMV BLD AUTO: 8.8 FL (ref 9.2–12.9)
POTASSIUM SERPL-SCNC: 4.2 MMOL/L (ref 3.5–5.1)
PROT SERPL-MCNC: 6.9 G/DL (ref 6–8.4)
RBC # BLD AUTO: 4.1 M/UL (ref 4.6–6.2)
SODIUM SERPL-SCNC: 141 MMOL/L (ref 136–145)
WBC # BLD AUTO: 8.24 K/UL (ref 3.9–12.7)

## 2021-03-17 PROCEDURE — 36415 COLL VENOUS BLD VENIPUNCTURE: CPT | Performed by: INTERNAL MEDICINE

## 2021-03-17 PROCEDURE — 99999 PR PBB SHADOW E&M-EST. PATIENT-LVL III: ICD-10-PCS | Mod: PBBFAC,,, | Performed by: INTERNAL MEDICINE

## 2021-03-17 PROCEDURE — 80053 COMPREHEN METABOLIC PANEL: CPT | Performed by: INTERNAL MEDICINE

## 2021-03-17 PROCEDURE — 99999 PR PBB SHADOW E&M-EST. PATIENT-LVL III: CPT | Mod: PBBFAC,,, | Performed by: INTERNAL MEDICINE

## 2021-03-17 PROCEDURE — 85027 COMPLETE CBC AUTOMATED: CPT | Performed by: INTERNAL MEDICINE

## 2021-03-17 RX ORDER — HEPARIN 100 UNIT/ML
500 SYRINGE INTRAVENOUS
Status: CANCELLED | OUTPATIENT
Start: 2021-03-25

## 2021-03-17 RX ORDER — ONDANSETRON 2 MG/ML
8 INJECTION INTRAMUSCULAR; INTRAVENOUS
Status: CANCELLED | OUTPATIENT
Start: 2021-03-18

## 2021-03-17 RX ORDER — SODIUM CHLORIDE 0.9 % (FLUSH) 0.9 %
10 SYRINGE (ML) INJECTION
Status: CANCELLED | OUTPATIENT
Start: 2021-03-18

## 2021-03-17 RX ORDER — HEPARIN 100 UNIT/ML
500 SYRINGE INTRAVENOUS
Status: CANCELLED | OUTPATIENT
Start: 2021-03-18

## 2021-03-17 RX ORDER — SODIUM CHLORIDE 0.9 % (FLUSH) 0.9 %
10 SYRINGE (ML) INJECTION
Status: CANCELLED | OUTPATIENT
Start: 2021-03-25

## 2021-03-17 RX ORDER — ONDANSETRON 2 MG/ML
8 INJECTION INTRAMUSCULAR; INTRAVENOUS
Status: CANCELLED | OUTPATIENT
Start: 2021-03-25

## 2021-03-18 ENCOUNTER — INFUSION (OUTPATIENT)
Dept: INFUSION THERAPY | Facility: HOSPITAL | Age: 61
End: 2021-03-18
Attending: INTERNAL MEDICINE

## 2021-03-18 VITALS
OXYGEN SATURATION: 99 % | DIASTOLIC BLOOD PRESSURE: 71 MMHG | TEMPERATURE: 99 F | SYSTOLIC BLOOD PRESSURE: 126 MMHG | RESPIRATION RATE: 18 BRPM | HEART RATE: 73 BPM

## 2021-03-18 DIAGNOSIS — C34.02 LUNG CANCER, MAIN BRONCHUS, LEFT: ICD-10-CM

## 2021-03-18 DIAGNOSIS — R91.8 MULTIPLE PULMONARY NODULES: ICD-10-CM

## 2021-03-18 DIAGNOSIS — C79.51 SECONDARY MALIGNANT NEOPLASM OF BONE: Primary | ICD-10-CM

## 2021-03-18 PROCEDURE — 63600175 PHARM REV CODE 636 W HCPCS: Performed by: INTERNAL MEDICINE

## 2021-03-18 PROCEDURE — 96413 CHEMO IV INFUSION 1 HR: CPT

## 2021-03-18 PROCEDURE — 25000003 PHARM REV CODE 250: Performed by: INTERNAL MEDICINE

## 2021-03-18 RX ORDER — ONDANSETRON 2 MG/ML
8 INJECTION INTRAMUSCULAR; INTRAVENOUS
Status: COMPLETED | OUTPATIENT
Start: 2021-03-18 | End: 2021-03-18

## 2021-03-18 RX ADMIN — ONDANSETRON 8 MG: 2 INJECTION INTRAMUSCULAR; INTRAVENOUS at 01:03

## 2021-03-18 RX ADMIN — GEMCITABINE 2940 MG: 38 INJECTION, POWDER, LYOPHILIZED, FOR SOLUTION INTRAVENOUS at 01:03

## 2021-03-23 ENCOUNTER — PATIENT MESSAGE (OUTPATIENT)
Dept: ADMINISTRATIVE | Facility: OTHER | Age: 61
End: 2021-03-23

## 2021-03-24 ENCOUNTER — LAB VISIT (OUTPATIENT)
Dept: LAB | Facility: HOSPITAL | Age: 61
End: 2021-03-24
Attending: INTERNAL MEDICINE
Payer: COMMERCIAL

## 2021-03-24 DIAGNOSIS — C34.02 LUNG CANCER, MAIN BRONCHUS, LEFT: ICD-10-CM

## 2021-03-24 DIAGNOSIS — R91.8 MULTIPLE PULMONARY NODULES: ICD-10-CM

## 2021-03-24 LAB
ALBUMIN SERPL BCP-MCNC: 3.5 G/DL (ref 3.5–5.2)
ALP SERPL-CCNC: 63 U/L (ref 55–135)
ALT SERPL W/O P-5'-P-CCNC: 67 U/L (ref 10–44)
ANION GAP SERPL CALC-SCNC: 12 MMOL/L (ref 8–16)
AST SERPL-CCNC: 34 U/L (ref 10–40)
BASOPHILS # BLD AUTO: 0.12 K/UL (ref 0–0.2)
BASOPHILS NFR BLD: 2.2 % (ref 0–1.9)
BILIRUB SERPL-MCNC: 0.3 MG/DL (ref 0.1–1)
BUN SERPL-MCNC: 5 MG/DL (ref 6–20)
CALCIUM SERPL-MCNC: 9.2 MG/DL (ref 8.7–10.5)
CHLORIDE SERPL-SCNC: 102 MMOL/L (ref 95–110)
CO2 SERPL-SCNC: 25 MMOL/L (ref 23–29)
CREAT SERPL-MCNC: 0.9 MG/DL (ref 0.5–1.4)
DIFFERENTIAL METHOD: ABNORMAL
EOSINOPHIL # BLD AUTO: 0.1 K/UL (ref 0–0.5)
EOSINOPHIL NFR BLD: 2.2 % (ref 0–8)
ERYTHROCYTE [DISTWIDTH] IN BLOOD BY AUTOMATED COUNT: 14.9 % (ref 11.5–14.5)
EST. GFR  (AFRICAN AMERICAN): >60 ML/MIN/1.73 M^2
EST. GFR  (NON AFRICAN AMERICAN): >60 ML/MIN/1.73 M^2
GLUCOSE SERPL-MCNC: 141 MG/DL (ref 70–110)
HCT VFR BLD AUTO: 36.3 % (ref 40–54)
HGB BLD-MCNC: 12.9 G/DL (ref 14–18)
IMM GRANULOCYTES # BLD AUTO: 0.09 K/UL (ref 0–0.04)
IMM GRANULOCYTES NFR BLD AUTO: 1.7 % (ref 0–0.5)
LYMPHOCYTES # BLD AUTO: 1.5 K/UL (ref 1–4.8)
LYMPHOCYTES NFR BLD: 27.5 % (ref 18–48)
MCH RBC QN AUTO: 35.2 PG (ref 27–31)
MCHC RBC AUTO-ENTMCNC: 35.5 G/DL (ref 32–36)
MCV RBC AUTO: 99 FL (ref 82–98)
MONOCYTES # BLD AUTO: 0.6 K/UL (ref 0.3–1)
MONOCYTES NFR BLD: 11 % (ref 4–15)
NEUTROPHILS # BLD AUTO: 3 K/UL (ref 1.8–7.7)
NEUTROPHILS NFR BLD: 55.4 % (ref 38–73)
NRBC BLD-RTO: 0 /100 WBC
PLATELET # BLD AUTO: 318 K/UL (ref 150–350)
PMV BLD AUTO: 9.9 FL (ref 9.2–12.9)
POTASSIUM SERPL-SCNC: 4.3 MMOL/L (ref 3.5–5.1)
PROT SERPL-MCNC: 7.2 G/DL (ref 6–8.4)
RBC # BLD AUTO: 3.66 M/UL (ref 4.6–6.2)
SODIUM SERPL-SCNC: 139 MMOL/L (ref 136–145)
WBC # BLD AUTO: 5.34 K/UL (ref 3.9–12.7)

## 2021-03-24 PROCEDURE — 80053 COMPREHEN METABOLIC PANEL: CPT | Performed by: INTERNAL MEDICINE

## 2021-03-24 PROCEDURE — 36415 COLL VENOUS BLD VENIPUNCTURE: CPT | Mod: PO | Performed by: INTERNAL MEDICINE

## 2021-03-24 PROCEDURE — 85025 COMPLETE CBC W/AUTO DIFF WBC: CPT | Performed by: INTERNAL MEDICINE

## 2021-03-25 ENCOUNTER — INFUSION (OUTPATIENT)
Dept: INFUSION THERAPY | Facility: HOSPITAL | Age: 61
End: 2021-03-25
Attending: INTERNAL MEDICINE

## 2021-03-25 VITALS
TEMPERATURE: 99 F | SYSTOLIC BLOOD PRESSURE: 113 MMHG | DIASTOLIC BLOOD PRESSURE: 68 MMHG | RESPIRATION RATE: 17 BRPM | OXYGEN SATURATION: 98 % | HEART RATE: 72 BPM

## 2021-03-25 DIAGNOSIS — C34.02 LUNG CANCER, MAIN BRONCHUS, LEFT: ICD-10-CM

## 2021-03-25 DIAGNOSIS — C79.51 SECONDARY MALIGNANT NEOPLASM OF BONE: Primary | ICD-10-CM

## 2021-03-25 DIAGNOSIS — R91.8 MULTIPLE PULMONARY NODULES: ICD-10-CM

## 2021-03-25 PROCEDURE — 96375 TX/PRO/DX INJ NEW DRUG ADDON: CPT

## 2021-03-25 PROCEDURE — 63600175 PHARM REV CODE 636 W HCPCS: Performed by: INTERNAL MEDICINE

## 2021-03-25 PROCEDURE — 25000003 PHARM REV CODE 250: Performed by: INTERNAL MEDICINE

## 2021-03-25 PROCEDURE — 96413 CHEMO IV INFUSION 1 HR: CPT

## 2021-03-25 RX ORDER — ONDANSETRON 2 MG/ML
8 INJECTION INTRAMUSCULAR; INTRAVENOUS
Status: COMPLETED | OUTPATIENT
Start: 2021-03-25 | End: 2021-03-25

## 2021-03-25 RX ADMIN — ONDANSETRON 8 MG: 2 INJECTION INTRAMUSCULAR; INTRAVENOUS at 01:03

## 2021-03-25 RX ADMIN — GEMCITABINE 2940 MG: 38 INJECTION, POWDER, LYOPHILIZED, FOR SOLUTION INTRAVENOUS at 01:03

## 2021-03-26 ENCOUNTER — PATIENT MESSAGE (OUTPATIENT)
Dept: ADMINISTRATIVE | Facility: OTHER | Age: 61
End: 2021-03-26

## 2021-03-29 ENCOUNTER — PATIENT MESSAGE (OUTPATIENT)
Dept: ADMINISTRATIVE | Facility: OTHER | Age: 61
End: 2021-03-29

## 2021-03-30 ENCOUNTER — PATIENT MESSAGE (OUTPATIENT)
Dept: ADMINISTRATIVE | Facility: OTHER | Age: 61
End: 2021-03-30

## 2021-03-31 ENCOUNTER — PATIENT MESSAGE (OUTPATIENT)
Dept: ADMINISTRATIVE | Facility: OTHER | Age: 61
End: 2021-03-31

## 2021-04-01 ENCOUNTER — PATIENT MESSAGE (OUTPATIENT)
Dept: ADMINISTRATIVE | Facility: OTHER | Age: 61
End: 2021-04-01

## 2021-04-06 ENCOUNTER — LAB VISIT (OUTPATIENT)
Dept: LAB | Facility: HOSPITAL | Age: 61
End: 2021-04-06
Attending: INTERNAL MEDICINE
Payer: COMMERCIAL

## 2021-04-06 ENCOUNTER — PATIENT MESSAGE (OUTPATIENT)
Dept: ADMINISTRATIVE | Facility: HOSPITAL | Age: 61
End: 2021-04-06

## 2021-04-06 DIAGNOSIS — C34.02 LUNG CANCER, MAIN BRONCHUS, LEFT: ICD-10-CM

## 2021-04-06 LAB
ERYTHROCYTE [DISTWIDTH] IN BLOOD BY AUTOMATED COUNT: 17.6 % (ref 11.5–14.5)
HCT VFR BLD AUTO: 35 % (ref 40–54)
HGB BLD-MCNC: 11.7 G/DL (ref 14–18)
IMM GRANULOCYTES # BLD AUTO: 0.48 K/UL (ref 0–0.04)
MCH RBC QN AUTO: 33.7 PG (ref 27–31)
MCHC RBC AUTO-ENTMCNC: 33.4 G/DL (ref 32–36)
MCV RBC AUTO: 101 FL (ref 82–98)
NEUTROPHILS # BLD AUTO: 6.4 K/UL (ref 1.8–7.7)
PLATELET # BLD AUTO: 200 K/UL (ref 150–450)
PMV BLD AUTO: 9.4 FL (ref 9.2–12.9)
RBC # BLD AUTO: 3.47 M/UL (ref 4.6–6.2)
WBC # BLD AUTO: 11.15 K/UL (ref 3.9–12.7)

## 2021-04-06 PROCEDURE — 85027 COMPLETE CBC AUTOMATED: CPT | Performed by: INTERNAL MEDICINE

## 2021-04-06 PROCEDURE — 36415 COLL VENOUS BLD VENIPUNCTURE: CPT | Mod: PO | Performed by: INTERNAL MEDICINE

## 2021-04-07 ENCOUNTER — OFFICE VISIT (OUTPATIENT)
Dept: HEMATOLOGY/ONCOLOGY | Facility: CLINIC | Age: 61
End: 2021-04-07
Payer: COMMERCIAL

## 2021-04-07 VITALS
SYSTOLIC BLOOD PRESSURE: 157 MMHG | OXYGEN SATURATION: 97 % | DIASTOLIC BLOOD PRESSURE: 90 MMHG | TEMPERATURE: 99 F | BODY MASS INDEX: 34.37 KG/M2 | HEIGHT: 70 IN | WEIGHT: 240.06 LBS | HEART RATE: 75 BPM

## 2021-04-07 DIAGNOSIS — C34.02 LUNG CANCER, MAIN BRONCHUS, LEFT: Primary | ICD-10-CM

## 2021-04-07 DIAGNOSIS — Z09 CHEMOTHERAPY FOLLOW-UP EXAMINATION: ICD-10-CM

## 2021-04-07 DIAGNOSIS — C79.51 SECONDARY MALIGNANT NEOPLASM OF BONE: ICD-10-CM

## 2021-04-07 PROCEDURE — 99999 PR PBB SHADOW E&M-EST. PATIENT-LVL III: ICD-10-PCS | Mod: PBBFAC,,, | Performed by: INTERNAL MEDICINE

## 2021-04-07 PROCEDURE — 99999 PR PBB SHADOW E&M-EST. PATIENT-LVL III: CPT | Mod: PBBFAC,,, | Performed by: INTERNAL MEDICINE

## 2021-04-07 RX ORDER — SODIUM CHLORIDE 0.9 % (FLUSH) 0.9 %
10 SYRINGE (ML) INJECTION
Status: CANCELLED | OUTPATIENT
Start: 2021-04-15

## 2021-04-07 RX ORDER — HEPARIN 100 UNIT/ML
500 SYRINGE INTRAVENOUS
Status: CANCELLED | OUTPATIENT
Start: 2021-04-08

## 2021-04-07 RX ORDER — HEPARIN 100 UNIT/ML
500 SYRINGE INTRAVENOUS
Status: CANCELLED | OUTPATIENT
Start: 2021-04-15

## 2021-04-07 RX ORDER — ONDANSETRON 2 MG/ML
8 INJECTION INTRAMUSCULAR; INTRAVENOUS
Status: CANCELLED | OUTPATIENT
Start: 2021-04-15

## 2021-04-07 RX ORDER — SODIUM CHLORIDE 0.9 % (FLUSH) 0.9 %
10 SYRINGE (ML) INJECTION
Status: CANCELLED | OUTPATIENT
Start: 2021-04-08

## 2021-04-07 RX ORDER — ONDANSETRON 2 MG/ML
8 INJECTION INTRAMUSCULAR; INTRAVENOUS
Status: CANCELLED | OUTPATIENT
Start: 2021-04-08

## 2021-04-08 ENCOUNTER — INFUSION (OUTPATIENT)
Dept: INFUSION THERAPY | Facility: HOSPITAL | Age: 61
End: 2021-04-08
Attending: INTERNAL MEDICINE
Payer: COMMERCIAL

## 2021-04-08 VITALS
TEMPERATURE: 98 F | HEART RATE: 72 BPM | RESPIRATION RATE: 16 BRPM | DIASTOLIC BLOOD PRESSURE: 72 MMHG | SYSTOLIC BLOOD PRESSURE: 149 MMHG | OXYGEN SATURATION: 98 %

## 2021-04-08 DIAGNOSIS — C34.02 LUNG CANCER, MAIN BRONCHUS, LEFT: ICD-10-CM

## 2021-04-08 DIAGNOSIS — C79.51 SECONDARY MALIGNANT NEOPLASM OF BONE: Primary | ICD-10-CM

## 2021-04-08 DIAGNOSIS — R91.8 MULTIPLE PULMONARY NODULES: ICD-10-CM

## 2021-04-08 PROCEDURE — 96375 TX/PRO/DX INJ NEW DRUG ADDON: CPT

## 2021-04-08 PROCEDURE — 25000003 PHARM REV CODE 250: Performed by: INTERNAL MEDICINE

## 2021-04-08 PROCEDURE — 63600175 PHARM REV CODE 636 W HCPCS: Performed by: INTERNAL MEDICINE

## 2021-04-08 PROCEDURE — 96413 CHEMO IV INFUSION 1 HR: CPT

## 2021-04-08 RX ORDER — ONDANSETRON 2 MG/ML
8 INJECTION INTRAMUSCULAR; INTRAVENOUS
Status: COMPLETED | OUTPATIENT
Start: 2021-04-08 | End: 2021-04-08

## 2021-04-08 RX ADMIN — ONDANSETRON 8 MG: 2 INJECTION INTRAMUSCULAR; INTRAVENOUS at 01:04

## 2021-04-08 RX ADMIN — GEMCITABINE 2940 MG: 1 INJECTION, POWDER, LYOPHILIZED, FOR SOLUTION INTRAVENOUS at 01:04

## 2021-04-14 ENCOUNTER — LAB VISIT (OUTPATIENT)
Dept: LAB | Facility: HOSPITAL | Age: 61
End: 2021-04-14
Attending: INTERNAL MEDICINE
Payer: COMMERCIAL

## 2021-04-14 DIAGNOSIS — C34.02 LUNG CANCER, MAIN BRONCHUS, LEFT: ICD-10-CM

## 2021-04-14 DIAGNOSIS — R91.8 MULTIPLE PULMONARY NODULES: ICD-10-CM

## 2021-04-14 LAB
ALBUMIN SERPL BCP-MCNC: 3.2 G/DL (ref 3.5–5.2)
ALP SERPL-CCNC: 61 U/L (ref 55–135)
ALT SERPL W/O P-5'-P-CCNC: 55 U/L (ref 10–44)
ANION GAP SERPL CALC-SCNC: 9 MMOL/L (ref 8–16)
AST SERPL-CCNC: 43 U/L (ref 10–40)
BASOPHILS # BLD AUTO: 0.15 K/UL (ref 0–0.2)
BASOPHILS NFR BLD: 1.8 % (ref 0–1.9)
BILIRUB SERPL-MCNC: 0.3 MG/DL (ref 0.1–1)
BUN SERPL-MCNC: 6 MG/DL (ref 6–20)
CALCIUM SERPL-MCNC: 8.7 MG/DL (ref 8.7–10.5)
CHLORIDE SERPL-SCNC: 104 MMOL/L (ref 95–110)
CO2 SERPL-SCNC: 26 MMOL/L (ref 23–29)
CREAT SERPL-MCNC: 0.7 MG/DL (ref 0.5–1.4)
DIFFERENTIAL METHOD: ABNORMAL
EOSINOPHIL # BLD AUTO: 0.3 K/UL (ref 0–0.5)
EOSINOPHIL NFR BLD: 3 % (ref 0–8)
ERYTHROCYTE [DISTWIDTH] IN BLOOD BY AUTOMATED COUNT: 17.7 % (ref 11.5–14.5)
EST. GFR  (AFRICAN AMERICAN): >60 ML/MIN/1.73 M^2
EST. GFR  (NON AFRICAN AMERICAN): >60 ML/MIN/1.73 M^2
GLUCOSE SERPL-MCNC: 128 MG/DL (ref 70–110)
HCT VFR BLD AUTO: 33.7 % (ref 40–54)
HGB BLD-MCNC: 11.3 G/DL (ref 14–18)
IMM GRANULOCYTES # BLD AUTO: 0.09 K/UL (ref 0–0.04)
IMM GRANULOCYTES NFR BLD AUTO: 1.1 % (ref 0–0.5)
LYMPHOCYTES # BLD AUTO: 1.3 K/UL (ref 1–4.8)
LYMPHOCYTES NFR BLD: 14.8 % (ref 18–48)
MCH RBC QN AUTO: 34.3 PG (ref 27–31)
MCHC RBC AUTO-ENTMCNC: 33.5 G/DL (ref 32–36)
MCV RBC AUTO: 102 FL (ref 82–98)
MONOCYTES # BLD AUTO: 0.9 K/UL (ref 0.3–1)
MONOCYTES NFR BLD: 9.9 % (ref 4–15)
NEUTROPHILS # BLD AUTO: 6 K/UL (ref 1.8–7.7)
NEUTROPHILS NFR BLD: 69.4 % (ref 38–73)
NRBC BLD-RTO: 0 /100 WBC
PLATELET # BLD AUTO: 227 K/UL (ref 150–450)
PMV BLD AUTO: 9.5 FL (ref 9.2–12.9)
POTASSIUM SERPL-SCNC: 4 MMOL/L (ref 3.5–5.1)
PROT SERPL-MCNC: 6.9 G/DL (ref 6–8.4)
RBC # BLD AUTO: 3.29 M/UL (ref 4.6–6.2)
SODIUM SERPL-SCNC: 139 MMOL/L (ref 136–145)
WBC # BLD AUTO: 8.57 K/UL (ref 3.9–12.7)

## 2021-04-14 PROCEDURE — 80053 COMPREHEN METABOLIC PANEL: CPT | Performed by: INTERNAL MEDICINE

## 2021-04-14 PROCEDURE — 36415 COLL VENOUS BLD VENIPUNCTURE: CPT | Mod: PO | Performed by: INTERNAL MEDICINE

## 2021-04-14 PROCEDURE — 85025 COMPLETE CBC W/AUTO DIFF WBC: CPT | Performed by: INTERNAL MEDICINE

## 2021-04-15 ENCOUNTER — INFUSION (OUTPATIENT)
Dept: INFUSION THERAPY | Facility: HOSPITAL | Age: 61
End: 2021-04-15
Attending: INTERNAL MEDICINE
Payer: COMMERCIAL

## 2021-04-15 VITALS
RESPIRATION RATE: 16 BRPM | DIASTOLIC BLOOD PRESSURE: 72 MMHG | OXYGEN SATURATION: 100 % | HEART RATE: 72 BPM | SYSTOLIC BLOOD PRESSURE: 137 MMHG | TEMPERATURE: 98 F

## 2021-04-15 DIAGNOSIS — C79.51 SECONDARY MALIGNANT NEOPLASM OF BONE: Primary | ICD-10-CM

## 2021-04-15 DIAGNOSIS — C34.02 LUNG CANCER, MAIN BRONCHUS, LEFT: ICD-10-CM

## 2021-04-15 DIAGNOSIS — R91.8 MULTIPLE PULMONARY NODULES: ICD-10-CM

## 2021-04-15 PROCEDURE — 96413 CHEMO IV INFUSION 1 HR: CPT

## 2021-04-15 PROCEDURE — 25000003 PHARM REV CODE 250: Performed by: INTERNAL MEDICINE

## 2021-04-15 PROCEDURE — 96375 TX/PRO/DX INJ NEW DRUG ADDON: CPT

## 2021-04-15 PROCEDURE — 63600175 PHARM REV CODE 636 W HCPCS: Performed by: INTERNAL MEDICINE

## 2021-04-15 RX ORDER — ONDANSETRON 2 MG/ML
8 INJECTION INTRAMUSCULAR; INTRAVENOUS
Status: COMPLETED | OUTPATIENT
Start: 2021-04-15 | End: 2021-04-15

## 2021-04-15 RX ADMIN — ONDANSETRON 8 MG: 2 INJECTION INTRAMUSCULAR; INTRAVENOUS at 01:04

## 2021-04-15 RX ADMIN — GEMCITABINE HYDROCHLORIDE 2940 MG: 1 INJECTION, POWDER, LYOPHILIZED, FOR SOLUTION INTRAVENOUS at 01:04

## 2021-04-26 ENCOUNTER — LAB VISIT (OUTPATIENT)
Dept: LAB | Facility: HOSPITAL | Age: 61
End: 2021-04-26
Attending: INTERNAL MEDICINE
Payer: COMMERCIAL

## 2021-04-26 DIAGNOSIS — C79.51 SECONDARY MALIGNANT NEOPLASM OF BONE: ICD-10-CM

## 2021-04-26 DIAGNOSIS — C34.02 LUNG CANCER, MAIN BRONCHUS, LEFT: ICD-10-CM

## 2021-04-26 DIAGNOSIS — Z09 CHEMOTHERAPY FOLLOW-UP EXAMINATION: ICD-10-CM

## 2021-04-26 LAB
ALBUMIN SERPL BCP-MCNC: 3 G/DL (ref 3.5–5.2)
ALP SERPL-CCNC: 56 U/L (ref 55–135)
ALT SERPL W/O P-5'-P-CCNC: 50 U/L (ref 10–44)
ANION GAP SERPL CALC-SCNC: 9 MMOL/L (ref 8–16)
AST SERPL-CCNC: 38 U/L (ref 10–40)
BASOPHILS # BLD AUTO: 0.04 K/UL (ref 0–0.2)
BASOPHILS NFR BLD: 0.5 % (ref 0–1.9)
BILIRUB SERPL-MCNC: 0.4 MG/DL (ref 0.1–1)
BUN SERPL-MCNC: 6 MG/DL (ref 6–20)
CALCIUM SERPL-MCNC: 8.6 MG/DL (ref 8.7–10.5)
CHLORIDE SERPL-SCNC: 104 MMOL/L (ref 95–110)
CO2 SERPL-SCNC: 28 MMOL/L (ref 23–29)
CREAT SERPL-MCNC: 0.8 MG/DL (ref 0.5–1.4)
DIFFERENTIAL METHOD: ABNORMAL
EOSINOPHIL # BLD AUTO: 0.1 K/UL (ref 0–0.5)
EOSINOPHIL NFR BLD: 1.5 % (ref 0–8)
ERYTHROCYTE [DISTWIDTH] IN BLOOD BY AUTOMATED COUNT: 21.4 % (ref 11.5–14.5)
EST. GFR  (AFRICAN AMERICAN): >60 ML/MIN/1.73 M^2
EST. GFR  (NON AFRICAN AMERICAN): >60 ML/MIN/1.73 M^2
GLUCOSE SERPL-MCNC: 123 MG/DL (ref 70–110)
HCT VFR BLD AUTO: 33.3 % (ref 40–54)
HGB BLD-MCNC: 11.1 G/DL (ref 14–18)
IMM GRANULOCYTES # BLD AUTO: 0.4 K/UL (ref 0–0.04)
IMM GRANULOCYTES NFR BLD AUTO: 4.6 % (ref 0–0.5)
LYMPHOCYTES # BLD AUTO: 1.6 K/UL (ref 1–4.8)
LYMPHOCYTES NFR BLD: 18.5 % (ref 18–48)
MCH RBC QN AUTO: 35.7 PG (ref 27–31)
MCHC RBC AUTO-ENTMCNC: 33.3 G/DL (ref 32–36)
MCV RBC AUTO: 107 FL (ref 82–98)
MONOCYTES # BLD AUTO: 1.7 K/UL (ref 0.3–1)
MONOCYTES NFR BLD: 19.7 % (ref 4–15)
NEUTROPHILS # BLD AUTO: 4.8 K/UL (ref 1.8–7.7)
NEUTROPHILS NFR BLD: 55.2 % (ref 38–73)
NRBC BLD-RTO: 0 /100 WBC
PLATELET # BLD AUTO: 180 K/UL (ref 150–450)
PMV BLD AUTO: 10.3 FL (ref 9.2–12.9)
POTASSIUM SERPL-SCNC: 3.7 MMOL/L (ref 3.5–5.1)
PROT SERPL-MCNC: 6.2 G/DL (ref 6–8.4)
RBC # BLD AUTO: 3.11 M/UL (ref 4.6–6.2)
SODIUM SERPL-SCNC: 141 MMOL/L (ref 136–145)
WBC # BLD AUTO: 8.76 K/UL (ref 3.9–12.7)

## 2021-04-26 PROCEDURE — 36415 COLL VENOUS BLD VENIPUNCTURE: CPT | Mod: PO | Performed by: INTERNAL MEDICINE

## 2021-04-26 PROCEDURE — 85025 COMPLETE CBC W/AUTO DIFF WBC: CPT | Performed by: INTERNAL MEDICINE

## 2021-04-26 PROCEDURE — 80053 COMPREHEN METABOLIC PANEL: CPT | Performed by: INTERNAL MEDICINE

## 2021-04-28 ENCOUNTER — OFFICE VISIT (OUTPATIENT)
Dept: HEMATOLOGY/ONCOLOGY | Facility: CLINIC | Age: 61
End: 2021-04-28
Payer: COMMERCIAL

## 2021-04-28 VITALS
OXYGEN SATURATION: 100 % | HEIGHT: 70 IN | HEART RATE: 87 BPM | WEIGHT: 240.06 LBS | DIASTOLIC BLOOD PRESSURE: 87 MMHG | SYSTOLIC BLOOD PRESSURE: 166 MMHG | BODY MASS INDEX: 34.37 KG/M2

## 2021-04-28 DIAGNOSIS — C79.51 SECONDARY MALIGNANT NEOPLASM OF BONE: ICD-10-CM

## 2021-04-28 DIAGNOSIS — C34.02 LUNG CANCER, MAIN BRONCHUS, LEFT: Primary | ICD-10-CM

## 2021-04-28 DIAGNOSIS — Z09 CHEMOTHERAPY FOLLOW-UP EXAMINATION: ICD-10-CM

## 2021-04-28 PROCEDURE — 99999 PR PBB SHADOW E&M-EST. PATIENT-LVL III: ICD-10-PCS | Mod: PBBFAC,,, | Performed by: INTERNAL MEDICINE

## 2021-04-28 PROCEDURE — 99999 PR PBB SHADOW E&M-EST. PATIENT-LVL III: CPT | Mod: PBBFAC,,, | Performed by: INTERNAL MEDICINE

## 2021-04-28 RX ORDER — ONDANSETRON 2 MG/ML
8 INJECTION INTRAMUSCULAR; INTRAVENOUS
Status: CANCELLED | OUTPATIENT
Start: 2021-05-06

## 2021-04-28 RX ORDER — ONDANSETRON 2 MG/ML
8 INJECTION INTRAMUSCULAR; INTRAVENOUS
Status: CANCELLED | OUTPATIENT
Start: 2021-04-29

## 2021-04-28 RX ORDER — HEPARIN 100 UNIT/ML
500 SYRINGE INTRAVENOUS
Status: CANCELLED | OUTPATIENT
Start: 2021-05-06

## 2021-04-28 RX ORDER — HEPARIN 100 UNIT/ML
500 SYRINGE INTRAVENOUS
Status: CANCELLED | OUTPATIENT
Start: 2021-04-29

## 2021-04-28 RX ORDER — SODIUM CHLORIDE 0.9 % (FLUSH) 0.9 %
10 SYRINGE (ML) INJECTION
Status: CANCELLED | OUTPATIENT
Start: 2021-05-06

## 2021-04-28 RX ORDER — SODIUM CHLORIDE 0.9 % (FLUSH) 0.9 %
10 SYRINGE (ML) INJECTION
Status: CANCELLED | OUTPATIENT
Start: 2021-04-29

## 2021-04-29 ENCOUNTER — HOSPITAL ENCOUNTER (OUTPATIENT)
Dept: RADIOLOGY | Facility: HOSPITAL | Age: 61
Discharge: HOME | End: 2021-04-29
Attending: INTERNAL MEDICINE
Payer: MEDICARE

## 2021-04-29 ENCOUNTER — INFUSION (OUTPATIENT)
Dept: INFUSION THERAPY | Facility: HOSPITAL | Age: 61
End: 2021-04-29
Attending: INTERNAL MEDICINE
Payer: COMMERCIAL

## 2021-04-29 VITALS
RESPIRATION RATE: 17 BRPM | TEMPERATURE: 99 F | SYSTOLIC BLOOD PRESSURE: 143 MMHG | HEART RATE: 70 BPM | DIASTOLIC BLOOD PRESSURE: 74 MMHG | OXYGEN SATURATION: 99 %

## 2021-04-29 DIAGNOSIS — C79.51 SECONDARY MALIGNANT NEOPLASM OF BONE: Primary | ICD-10-CM

## 2021-04-29 DIAGNOSIS — C34.02 LUNG CANCER, MAIN BRONCHUS, LEFT: ICD-10-CM

## 2021-04-29 DIAGNOSIS — M79.89 SWELLING OF RIGHT UPPER EXTREMITY: ICD-10-CM

## 2021-04-29 DIAGNOSIS — R91.8 MULTIPLE PULMONARY NODULES: ICD-10-CM

## 2021-04-29 DIAGNOSIS — M79.89 SWELLING OF RIGHT UPPER EXTREMITY: Primary | ICD-10-CM

## 2021-04-29 PROCEDURE — 93971 EXTREMITY STUDY: CPT | Mod: 26,RT,, | Performed by: RADIOLOGY

## 2021-04-29 PROCEDURE — 93971 US UPPER EXTREMITY VEINS RIGHT: ICD-10-PCS | Mod: 26,RT,, | Performed by: RADIOLOGY

## 2021-04-29 PROCEDURE — 96413 CHEMO IV INFUSION 1 HR: CPT

## 2021-04-29 PROCEDURE — 93971 EXTREMITY STUDY: CPT | Mod: TC,RT

## 2021-04-29 PROCEDURE — 63600175 PHARM REV CODE 636 W HCPCS: Performed by: INTERNAL MEDICINE

## 2021-04-29 PROCEDURE — 96375 TX/PRO/DX INJ NEW DRUG ADDON: CPT

## 2021-04-29 PROCEDURE — 25000003 PHARM REV CODE 250: Performed by: INTERNAL MEDICINE

## 2021-04-29 RX ORDER — ONDANSETRON 2 MG/ML
8 INJECTION INTRAMUSCULAR; INTRAVENOUS
Status: COMPLETED | OUTPATIENT
Start: 2021-04-29 | End: 2021-04-29

## 2021-04-29 RX ADMIN — ONDANSETRON 8 MG: 2 INJECTION INTRAMUSCULAR; INTRAVENOUS at 01:04

## 2021-04-29 RX ADMIN — GEMCITABINE 2940 MG: 38 INJECTION, POWDER, LYOPHILIZED, FOR SOLUTION INTRAVENOUS at 01:04

## 2021-04-30 ENCOUNTER — OFFICE VISIT (OUTPATIENT)
Dept: HEMATOLOGY/ONCOLOGY | Facility: CLINIC | Age: 61
End: 2021-04-30
Payer: COMMERCIAL

## 2021-04-30 VITALS
DIASTOLIC BLOOD PRESSURE: 76 MMHG | HEART RATE: 72 BPM | WEIGHT: 240.31 LBS | BODY MASS INDEX: 34.4 KG/M2 | SYSTOLIC BLOOD PRESSURE: 117 MMHG | TEMPERATURE: 98 F | HEIGHT: 70 IN | OXYGEN SATURATION: 96 %

## 2021-04-30 DIAGNOSIS — I80.9 PHLEBITIS AFTER INFUSION, SEQUELA: Primary | ICD-10-CM

## 2021-04-30 DIAGNOSIS — T80.1XXS PHLEBITIS AFTER INFUSION, SEQUELA: Primary | ICD-10-CM

## 2021-04-30 PROBLEM — T80.1XXA PHLEBITIS AFTER INFUSION: Status: ACTIVE | Noted: 2021-04-30

## 2021-04-30 PROCEDURE — 99999 PR PBB SHADOW E&M-EST. PATIENT-LVL III: CPT | Mod: PBBFAC,,, | Performed by: INTERNAL MEDICINE

## 2021-04-30 PROCEDURE — 99999 PR PBB SHADOW E&M-EST. PATIENT-LVL III: ICD-10-PCS | Mod: PBBFAC,,, | Performed by: INTERNAL MEDICINE

## 2021-04-30 RX ORDER — IBUPROFEN 600 MG/1
TABLET ORAL
Qty: 30 TABLET | Refills: 1 | Status: SHIPPED | OUTPATIENT
Start: 2021-04-30 | End: 2021-08-12 | Stop reason: ALTCHOICE

## 2021-05-03 ENCOUNTER — LAB VISIT (OUTPATIENT)
Dept: LAB | Facility: HOSPITAL | Age: 61
End: 2021-05-03
Attending: INTERNAL MEDICINE
Payer: COMMERCIAL

## 2021-05-03 DIAGNOSIS — C34.02 LUNG CANCER, MAIN BRONCHUS, LEFT: ICD-10-CM

## 2021-05-03 DIAGNOSIS — R91.8 MULTIPLE PULMONARY NODULES: ICD-10-CM

## 2021-05-03 LAB
ALBUMIN SERPL BCP-MCNC: 2.8 G/DL (ref 3.5–5.2)
ALP SERPL-CCNC: 61 U/L (ref 55–135)
ALT SERPL W/O P-5'-P-CCNC: 44 U/L (ref 10–44)
ANION GAP SERPL CALC-SCNC: 9 MMOL/L (ref 8–16)
ANISOCYTOSIS BLD QL SMEAR: SLIGHT
AST SERPL-CCNC: 40 U/L (ref 10–40)
BASOPHILS # BLD AUTO: 0.08 K/UL (ref 0–0.2)
BASOPHILS NFR BLD: 0.5 % (ref 0–1.9)
BILIRUB SERPL-MCNC: 0.9 MG/DL (ref 0.1–1)
BUN SERPL-MCNC: 9 MG/DL (ref 6–20)
CALCIUM SERPL-MCNC: 8.4 MG/DL (ref 8.7–10.5)
CHLORIDE SERPL-SCNC: 101 MMOL/L (ref 95–110)
CO2 SERPL-SCNC: 27 MMOL/L (ref 23–29)
CREAT SERPL-MCNC: 0.7 MG/DL (ref 0.5–1.4)
DIFFERENTIAL METHOD: ABNORMAL
EOSINOPHIL # BLD AUTO: 0.2 K/UL (ref 0–0.5)
EOSINOPHIL NFR BLD: 1.2 % (ref 0–8)
ERYTHROCYTE [DISTWIDTH] IN BLOOD BY AUTOMATED COUNT: 19.9 % (ref 11.5–14.5)
EST. GFR  (AFRICAN AMERICAN): >60 ML/MIN/1.73 M^2
EST. GFR  (NON AFRICAN AMERICAN): >60 ML/MIN/1.73 M^2
GIANT PLATELETS BLD QL SMEAR: PRESENT
GLUCOSE SERPL-MCNC: 140 MG/DL (ref 70–110)
HCT VFR BLD AUTO: 31.4 % (ref 40–54)
HGB BLD-MCNC: 10.1 G/DL (ref 14–18)
IMM GRANULOCYTES # BLD AUTO: 0.08 K/UL (ref 0–0.04)
IMM GRANULOCYTES NFR BLD AUTO: 0.5 % (ref 0–0.5)
LYMPHOCYTES # BLD AUTO: 0.6 K/UL (ref 1–4.8)
LYMPHOCYTES NFR BLD: 3.5 % (ref 18–48)
MCH RBC QN AUTO: 35.2 PG (ref 27–31)
MCHC RBC AUTO-ENTMCNC: 32.2 G/DL (ref 32–36)
MCV RBC AUTO: 109 FL (ref 82–98)
MONOCYTES # BLD AUTO: 0.4 K/UL (ref 0.3–1)
MONOCYTES NFR BLD: 2.2 % (ref 4–15)
NEUTROPHILS # BLD AUTO: 15.2 K/UL (ref 1.8–7.7)
NEUTROPHILS NFR BLD: 92.1 % (ref 38–73)
NRBC BLD-RTO: 0 /100 WBC
PLATELET # BLD AUTO: 354 K/UL (ref 150–450)
PLATELET BLD QL SMEAR: ABNORMAL
PMV BLD AUTO: 10 FL (ref 9.2–12.9)
POTASSIUM SERPL-SCNC: 3.9 MMOL/L (ref 3.5–5.1)
PROT SERPL-MCNC: 6.2 G/DL (ref 6–8.4)
RBC # BLD AUTO: 2.87 M/UL (ref 4.6–6.2)
SODIUM SERPL-SCNC: 137 MMOL/L (ref 136–145)
WBC # BLD AUTO: 16.46 K/UL (ref 3.9–12.7)

## 2021-05-03 PROCEDURE — 80053 COMPREHEN METABOLIC PANEL: CPT | Performed by: INTERNAL MEDICINE

## 2021-05-03 PROCEDURE — 85025 COMPLETE CBC W/AUTO DIFF WBC: CPT | Performed by: INTERNAL MEDICINE

## 2021-05-03 PROCEDURE — 36415 COLL VENOUS BLD VENIPUNCTURE: CPT | Mod: PO | Performed by: INTERNAL MEDICINE

## 2021-05-04 ENCOUNTER — TELEPHONE (OUTPATIENT)
Dept: HEMATOLOGY/ONCOLOGY | Facility: CLINIC | Age: 61
End: 2021-05-04

## 2021-05-04 ENCOUNTER — OFFICE VISIT (OUTPATIENT)
Dept: HEMATOLOGY/ONCOLOGY | Facility: CLINIC | Age: 61
End: 2021-05-04
Payer: COMMERCIAL

## 2021-05-04 ENCOUNTER — PATIENT MESSAGE (OUTPATIENT)
Dept: HEMATOLOGY/ONCOLOGY | Facility: CLINIC | Age: 61
End: 2021-05-04

## 2021-05-04 VITALS
SYSTOLIC BLOOD PRESSURE: 130 MMHG | HEIGHT: 70 IN | WEIGHT: 238.56 LBS | DIASTOLIC BLOOD PRESSURE: 80 MMHG | BODY MASS INDEX: 34.15 KG/M2 | TEMPERATURE: 98 F | HEART RATE: 77 BPM | OXYGEN SATURATION: 97 %

## 2021-05-04 DIAGNOSIS — T80.1XXS PHLEBITIS AFTER INFUSION, SEQUELA: Primary | ICD-10-CM

## 2021-05-04 DIAGNOSIS — M79.89 SWELLING OF RIGHT UPPER EXTREMITY: ICD-10-CM

## 2021-05-04 DIAGNOSIS — C34.02 LUNG CANCER, MAIN BRONCHUS, LEFT: ICD-10-CM

## 2021-05-04 DIAGNOSIS — I80.9 PHLEBITIS AFTER INFUSION, SEQUELA: Primary | ICD-10-CM

## 2021-05-04 PROCEDURE — 99999 PR PBB SHADOW E&M-EST. PATIENT-LVL III: ICD-10-PCS | Mod: PBBFAC,,, | Performed by: INTERNAL MEDICINE

## 2021-05-04 PROCEDURE — 99999 PR PBB SHADOW E&M-EST. PATIENT-LVL III: CPT | Mod: PBBFAC,,, | Performed by: INTERNAL MEDICINE

## 2021-05-17 ENCOUNTER — LAB VISIT (OUTPATIENT)
Dept: LAB | Facility: HOSPITAL | Age: 61
End: 2021-05-17
Attending: INTERNAL MEDICINE
Payer: COMMERCIAL

## 2021-05-17 DIAGNOSIS — Z09 CHEMOTHERAPY FOLLOW-UP EXAMINATION: ICD-10-CM

## 2021-05-17 DIAGNOSIS — C34.02 LUNG CANCER, MAIN BRONCHUS, LEFT: ICD-10-CM

## 2021-05-17 DIAGNOSIS — C79.51 SECONDARY MALIGNANT NEOPLASM OF BONE: ICD-10-CM

## 2021-05-17 LAB
ALBUMIN SERPL BCP-MCNC: 3.2 G/DL (ref 3.5–5.2)
ALP SERPL-CCNC: 73 U/L (ref 55–135)
ALT SERPL W/O P-5'-P-CCNC: 12 U/L (ref 10–44)
ANION GAP SERPL CALC-SCNC: 7 MMOL/L (ref 8–16)
AST SERPL-CCNC: 15 U/L (ref 10–40)
BASOPHILS # BLD AUTO: 0.09 K/UL (ref 0–0.2)
BASOPHILS NFR BLD: 1.1 % (ref 0–1.9)
BILIRUB SERPL-MCNC: 0.4 MG/DL (ref 0.1–1)
BUN SERPL-MCNC: 7 MG/DL (ref 6–20)
CALCIUM SERPL-MCNC: 9.1 MG/DL (ref 8.7–10.5)
CHLORIDE SERPL-SCNC: 106 MMOL/L (ref 95–110)
CO2 SERPL-SCNC: 28 MMOL/L (ref 23–29)
CREAT SERPL-MCNC: 0.7 MG/DL (ref 0.5–1.4)
DIFFERENTIAL METHOD: ABNORMAL
EOSINOPHIL # BLD AUTO: 0.2 K/UL (ref 0–0.5)
EOSINOPHIL NFR BLD: 2.2 % (ref 0–8)
ERYTHROCYTE [DISTWIDTH] IN BLOOD BY AUTOMATED COUNT: 18.5 % (ref 11.5–14.5)
EST. GFR  (AFRICAN AMERICAN): >60 ML/MIN/1.73 M^2
EST. GFR  (NON AFRICAN AMERICAN): >60 ML/MIN/1.73 M^2
GLUCOSE SERPL-MCNC: 93 MG/DL (ref 70–110)
HCT VFR BLD AUTO: 36.7 % (ref 40–54)
HGB BLD-MCNC: 11.7 G/DL (ref 14–18)
IMM GRANULOCYTES # BLD AUTO: 0.03 K/UL (ref 0–0.04)
IMM GRANULOCYTES NFR BLD AUTO: 0.4 % (ref 0–0.5)
LYMPHOCYTES # BLD AUTO: 1.7 K/UL (ref 1–4.8)
LYMPHOCYTES NFR BLD: 20.6 % (ref 18–48)
MCH RBC QN AUTO: 33.7 PG (ref 27–31)
MCHC RBC AUTO-ENTMCNC: 31.9 G/DL (ref 32–36)
MCV RBC AUTO: 106 FL (ref 82–98)
MONOCYTES # BLD AUTO: 1.1 K/UL (ref 0.3–1)
MONOCYTES NFR BLD: 13 % (ref 4–15)
NEUTROPHILS # BLD AUTO: 5.1 K/UL (ref 1.8–7.7)
NEUTROPHILS NFR BLD: 62.7 % (ref 38–73)
NRBC BLD-RTO: 0 /100 WBC
PLATELET # BLD AUTO: 340 K/UL (ref 150–450)
PMV BLD AUTO: 9.9 FL (ref 9.2–12.9)
POTASSIUM SERPL-SCNC: 4.1 MMOL/L (ref 3.5–5.1)
PROT SERPL-MCNC: 6.7 G/DL (ref 6–8.4)
RBC # BLD AUTO: 3.47 M/UL (ref 4.6–6.2)
SODIUM SERPL-SCNC: 141 MMOL/L (ref 136–145)
WBC # BLD AUTO: 8.07 K/UL (ref 3.9–12.7)

## 2021-05-17 PROCEDURE — 80053 COMPREHEN METABOLIC PANEL: CPT | Performed by: INTERNAL MEDICINE

## 2021-05-17 PROCEDURE — 36415 COLL VENOUS BLD VENIPUNCTURE: CPT | Mod: PO | Performed by: INTERNAL MEDICINE

## 2021-05-17 PROCEDURE — 85025 COMPLETE CBC W/AUTO DIFF WBC: CPT | Performed by: INTERNAL MEDICINE

## 2021-05-19 ENCOUNTER — OFFICE VISIT (OUTPATIENT)
Dept: HEMATOLOGY/ONCOLOGY | Facility: CLINIC | Age: 61
End: 2021-05-19
Payer: COMMERCIAL

## 2021-05-19 VITALS
DIASTOLIC BLOOD PRESSURE: 89 MMHG | TEMPERATURE: 99 F | WEIGHT: 227.94 LBS | HEART RATE: 73 BPM | OXYGEN SATURATION: 98 % | BODY MASS INDEX: 32.63 KG/M2 | HEIGHT: 70 IN | SYSTOLIC BLOOD PRESSURE: 184 MMHG

## 2021-05-19 DIAGNOSIS — C34.02 LUNG CANCER, MAIN BRONCHUS, LEFT: ICD-10-CM

## 2021-05-19 DIAGNOSIS — M79.89 SWELLING OF RIGHT UPPER EXTREMITY: ICD-10-CM

## 2021-05-19 DIAGNOSIS — T80.1XXS PHLEBITIS AFTER INFUSION, SEQUELA: Primary | ICD-10-CM

## 2021-05-19 DIAGNOSIS — I80.9 PHLEBITIS AFTER INFUSION, SEQUELA: Primary | ICD-10-CM

## 2021-05-19 PROCEDURE — 99999 PR PBB SHADOW E&M-EST. PATIENT-LVL III: ICD-10-PCS | Mod: PBBFAC,,, | Performed by: INTERNAL MEDICINE

## 2021-05-19 PROCEDURE — 99999 PR PBB SHADOW E&M-EST. PATIENT-LVL III: CPT | Mod: PBBFAC,,, | Performed by: INTERNAL MEDICINE

## 2021-05-24 ENCOUNTER — HOSPITAL ENCOUNTER (OUTPATIENT)
Dept: RADIOLOGY | Facility: HOSPITAL | Age: 61
Discharge: HOME | End: 2021-05-24
Attending: INTERNAL MEDICINE
Payer: MEDICARE

## 2021-05-24 DIAGNOSIS — T80.1XXS PHLEBITIS AFTER INFUSION, SEQUELA: ICD-10-CM

## 2021-05-24 DIAGNOSIS — C34.02 LUNG CANCER, MAIN BRONCHUS, LEFT: ICD-10-CM

## 2021-05-24 DIAGNOSIS — I80.9 PHLEBITIS AFTER INFUSION, SEQUELA: ICD-10-CM

## 2021-05-24 DIAGNOSIS — M79.89 SWELLING OF RIGHT UPPER EXTREMITY: ICD-10-CM

## 2021-05-24 PROCEDURE — 93971 EXTREMITY STUDY: CPT | Mod: 26,RT,, | Performed by: RADIOLOGY

## 2021-05-24 PROCEDURE — 93971 US UPPER EXTREMITY VEINS RIGHT: ICD-10-PCS | Mod: 26,RT,, | Performed by: RADIOLOGY

## 2021-05-24 PROCEDURE — 93971 EXTREMITY STUDY: CPT | Mod: TC,RT

## 2021-06-14 ENCOUNTER — LAB VISIT (OUTPATIENT)
Dept: LAB | Facility: HOSPITAL | Age: 61
End: 2021-06-14
Attending: INTERNAL MEDICINE
Payer: COMMERCIAL

## 2021-06-14 DIAGNOSIS — C34.02 LUNG CANCER, MAIN BRONCHUS, LEFT: ICD-10-CM

## 2021-06-14 DIAGNOSIS — R91.8 MULTIPLE PULMONARY NODULES: ICD-10-CM

## 2021-06-14 LAB
ALBUMIN SERPL BCP-MCNC: 3.7 G/DL (ref 3.5–5.2)
ALP SERPL-CCNC: 72 U/L (ref 55–135)
ALT SERPL W/O P-5'-P-CCNC: 16 U/L (ref 10–44)
ANION GAP SERPL CALC-SCNC: 7 MMOL/L (ref 8–16)
AST SERPL-CCNC: 17 U/L (ref 10–40)
BASOPHILS # BLD AUTO: 0.11 K/UL (ref 0–0.2)
BASOPHILS NFR BLD: 1.3 % (ref 0–1.9)
BILIRUB SERPL-MCNC: 0.3 MG/DL (ref 0.1–1)
BUN SERPL-MCNC: 9 MG/DL (ref 6–20)
CALCIUM SERPL-MCNC: 8.9 MG/DL (ref 8.7–10.5)
CHLORIDE SERPL-SCNC: 106 MMOL/L (ref 95–110)
CO2 SERPL-SCNC: 26 MMOL/L (ref 23–29)
CREAT SERPL-MCNC: 0.7 MG/DL (ref 0.5–1.4)
DIFFERENTIAL METHOD: ABNORMAL
EOSINOPHIL # BLD AUTO: 0.5 K/UL (ref 0–0.5)
EOSINOPHIL NFR BLD: 5.5 % (ref 0–8)
ERYTHROCYTE [DISTWIDTH] IN BLOOD BY AUTOMATED COUNT: 14.3 % (ref 11.5–14.5)
EST. GFR  (AFRICAN AMERICAN): >60 ML/MIN/1.73 M^2
EST. GFR  (NON AFRICAN AMERICAN): >60 ML/MIN/1.73 M^2
GLUCOSE SERPL-MCNC: 118 MG/DL (ref 70–110)
HCT VFR BLD AUTO: 43.3 % (ref 40–54)
HGB BLD-MCNC: 14.1 G/DL (ref 14–18)
IMM GRANULOCYTES # BLD AUTO: 0.02 K/UL (ref 0–0.04)
IMM GRANULOCYTES NFR BLD AUTO: 0.2 % (ref 0–0.5)
LYMPHOCYTES # BLD AUTO: 1.9 K/UL (ref 1–4.8)
LYMPHOCYTES NFR BLD: 21.3 % (ref 18–48)
MCH RBC QN AUTO: 32.9 PG (ref 27–31)
MCHC RBC AUTO-ENTMCNC: 32.6 G/DL (ref 32–36)
MCV RBC AUTO: 101 FL (ref 82–98)
MONOCYTES # BLD AUTO: 0.9 K/UL (ref 0.3–1)
MONOCYTES NFR BLD: 10.7 % (ref 4–15)
NEUTROPHILS # BLD AUTO: 5.4 K/UL (ref 1.8–7.7)
NEUTROPHILS NFR BLD: 61 % (ref 38–73)
NRBC BLD-RTO: 0 /100 WBC
PLATELET # BLD AUTO: 210 K/UL (ref 150–450)
PMV BLD AUTO: 10.2 FL (ref 9.2–12.9)
POTASSIUM SERPL-SCNC: 4.3 MMOL/L (ref 3.5–5.1)
PROT SERPL-MCNC: 7.3 G/DL (ref 6–8.4)
RBC # BLD AUTO: 4.29 M/UL (ref 4.6–6.2)
SODIUM SERPL-SCNC: 139 MMOL/L (ref 136–145)
WBC # BLD AUTO: 8.78 K/UL (ref 3.9–12.7)

## 2021-06-14 PROCEDURE — 85025 COMPLETE CBC W/AUTO DIFF WBC: CPT | Performed by: INTERNAL MEDICINE

## 2021-06-14 PROCEDURE — 80053 COMPREHEN METABOLIC PANEL: CPT | Performed by: INTERNAL MEDICINE

## 2021-06-14 PROCEDURE — 36415 COLL VENOUS BLD VENIPUNCTURE: CPT | Mod: PO | Performed by: INTERNAL MEDICINE

## 2021-06-17 ENCOUNTER — TELEPHONE (OUTPATIENT)
Dept: HEMATOLOGY/ONCOLOGY | Facility: CLINIC | Age: 61
End: 2021-06-17

## 2021-06-18 ENCOUNTER — OFFICE VISIT (OUTPATIENT)
Dept: HEMATOLOGY/ONCOLOGY | Facility: CLINIC | Age: 61
End: 2021-06-18
Payer: COMMERCIAL

## 2021-06-18 VITALS
TEMPERATURE: 98 F | BODY MASS INDEX: 33.27 KG/M2 | HEART RATE: 73 BPM | OXYGEN SATURATION: 98 % | WEIGHT: 232.38 LBS | DIASTOLIC BLOOD PRESSURE: 87 MMHG | SYSTOLIC BLOOD PRESSURE: 169 MMHG | HEIGHT: 70 IN

## 2021-06-18 DIAGNOSIS — C34.02 LUNG CANCER, MAIN BRONCHUS, LEFT: Primary | ICD-10-CM

## 2021-06-18 DIAGNOSIS — C34.90 MALIGNANT NEOPLASM OF UNSPECIFIED PART OF UNSPECIFIED BRONCHUS OR LUNG: ICD-10-CM

## 2021-06-18 DIAGNOSIS — T80.1XXS PHLEBITIS AFTER INFUSION, SEQUELA: ICD-10-CM

## 2021-06-18 DIAGNOSIS — C79.51 SECONDARY MALIGNANT NEOPLASM OF BONE: ICD-10-CM

## 2021-06-18 DIAGNOSIS — I80.9 PHLEBITIS AFTER INFUSION, SEQUELA: ICD-10-CM

## 2021-06-18 PROCEDURE — 99999 PR PBB SHADOW E&M-EST. PATIENT-LVL IV: CPT | Mod: PBBFAC,,, | Performed by: INTERNAL MEDICINE

## 2021-06-18 PROCEDURE — 99999 PR PBB SHADOW E&M-EST. PATIENT-LVL IV: ICD-10-PCS | Mod: PBBFAC,,, | Performed by: INTERNAL MEDICINE

## 2021-06-21 PROBLEM — Z09 CHEMOTHERAPY FOLLOW-UP EXAMINATION: Status: RESOLVED | Noted: 2019-10-15 | Resolved: 2021-06-21

## 2021-06-24 ENCOUNTER — TELEPHONE (OUTPATIENT)
Dept: HEMATOLOGY/ONCOLOGY | Facility: CLINIC | Age: 61
End: 2021-06-24

## 2021-06-24 ENCOUNTER — HOSPITAL ENCOUNTER (OUTPATIENT)
Dept: RADIOLOGY | Facility: HOSPITAL | Age: 61
Discharge: HOME | End: 2021-06-24
Attending: INTERNAL MEDICINE
Payer: COMMERCIAL

## 2021-06-24 DIAGNOSIS — C34.90 MALIGNANT NEOPLASM OF UNSPECIFIED PART OF UNSPECIFIED BRONCHUS OR LUNG: ICD-10-CM

## 2021-06-24 DIAGNOSIS — C79.51 SECONDARY MALIGNANT NEOPLASM OF BONE: ICD-10-CM

## 2021-06-24 DIAGNOSIS — C34.02 LUNG CANCER, MAIN BRONCHUS, LEFT: ICD-10-CM

## 2021-06-24 PROCEDURE — 71260 CT THORAX DX C+: CPT | Mod: TC

## 2021-06-24 PROCEDURE — 25500020 PHARM REV CODE 255: Performed by: INTERNAL MEDICINE

## 2021-06-24 PROCEDURE — 71260 CT THORAX DX C+: CPT | Mod: 26,,, | Performed by: RADIOLOGY

## 2021-06-24 PROCEDURE — 71260 CT CHEST WITH CONTRAST: ICD-10-PCS | Mod: 26,,, | Performed by: RADIOLOGY

## 2021-06-24 RX ADMIN — IOHEXOL 75 ML: 350 INJECTION, SOLUTION INTRAVENOUS at 08:06

## 2021-06-29 ENCOUNTER — OFFICE VISIT (OUTPATIENT)
Dept: HEMATOLOGY/ONCOLOGY | Facility: CLINIC | Age: 61
End: 2021-06-29
Payer: COMMERCIAL

## 2021-06-29 VITALS
WEIGHT: 231.94 LBS | DIASTOLIC BLOOD PRESSURE: 86 MMHG | HEIGHT: 70 IN | SYSTOLIC BLOOD PRESSURE: 153 MMHG | OXYGEN SATURATION: 98 % | HEART RATE: 73 BPM | TEMPERATURE: 98 F | BODY MASS INDEX: 33.21 KG/M2

## 2021-06-29 DIAGNOSIS — C34.02 LUNG CANCER, MAIN BRONCHUS, LEFT: Primary | ICD-10-CM

## 2021-06-29 DIAGNOSIS — C79.51 SECONDARY MALIGNANT NEOPLASM OF BONE: ICD-10-CM

## 2021-06-29 PROCEDURE — 99214 OFFICE O/P EST MOD 30 MIN: CPT | Mod: S$PBB,,, | Performed by: INTERNAL MEDICINE

## 2021-06-29 PROCEDURE — 99214 PR OFFICE/OUTPT VISIT, EST, LEVL IV, 30-39 MIN: ICD-10-PCS | Mod: S$PBB,,, | Performed by: INTERNAL MEDICINE

## 2021-06-29 PROCEDURE — 99999 PR PBB SHADOW E&M-EST. PATIENT-LVL III: ICD-10-PCS | Mod: PBBFAC,,, | Performed by: INTERNAL MEDICINE

## 2021-06-29 PROCEDURE — 99999 PR PBB SHADOW E&M-EST. PATIENT-LVL III: CPT | Mod: PBBFAC,,, | Performed by: INTERNAL MEDICINE

## 2021-07-07 ENCOUNTER — PATIENT MESSAGE (OUTPATIENT)
Dept: ADMINISTRATIVE | Facility: HOSPITAL | Age: 61
End: 2021-07-07

## 2021-07-29 ENCOUNTER — HOSPITAL ENCOUNTER (OUTPATIENT)
Dept: RADIOLOGY | Facility: HOSPITAL | Age: 61
Discharge: HOME | End: 2021-07-29
Attending: INTERNAL MEDICINE

## 2021-07-29 DIAGNOSIS — C34.02 LUNG CANCER, MAIN BRONCHUS, LEFT: ICD-10-CM

## 2021-07-29 PROCEDURE — 78815 PET IMAGE W/CT SKULL-THIGH: CPT | Mod: TC

## 2021-07-29 PROCEDURE — 78815 PET IMAGE W/CT SKULL-THIGH: CPT | Mod: 26,PS,, | Performed by: RADIOLOGY

## 2021-07-29 PROCEDURE — 78815 NM PET CT ROUTINE: ICD-10-PCS | Mod: 26,PS,, | Performed by: RADIOLOGY

## 2021-07-29 PROCEDURE — 70553 MRI BRAIN W WO CONTRAST: ICD-10-PCS | Mod: 26,,, | Performed by: RADIOLOGY

## 2021-07-29 PROCEDURE — 25500020 PHARM REV CODE 255: Performed by: INTERNAL MEDICINE

## 2021-07-29 PROCEDURE — 70553 MRI BRAIN STEM W/O & W/DYE: CPT | Mod: TC

## 2021-07-29 PROCEDURE — A9585 GADOBUTROL INJECTION: HCPCS | Performed by: INTERNAL MEDICINE

## 2021-07-29 PROCEDURE — 70553 MRI BRAIN STEM W/O & W/DYE: CPT | Mod: 26,,, | Performed by: RADIOLOGY

## 2021-07-29 RX ORDER — GADOBUTROL 604.72 MG/ML
10 INJECTION INTRAVENOUS
Status: COMPLETED | OUTPATIENT
Start: 2021-07-29 | End: 2021-07-29

## 2021-07-29 RX ADMIN — GADOBUTROL 10 ML: 604.72 INJECTION INTRAVENOUS at 12:07

## 2021-08-03 ENCOUNTER — OFFICE VISIT (OUTPATIENT)
Dept: HEMATOLOGY/ONCOLOGY | Facility: CLINIC | Age: 61
End: 2021-08-03
Payer: COMMERCIAL

## 2021-08-03 VITALS
TEMPERATURE: 99 F | WEIGHT: 240 LBS | OXYGEN SATURATION: 98 % | HEART RATE: 69 BPM | SYSTOLIC BLOOD PRESSURE: 180 MMHG | HEIGHT: 70 IN | DIASTOLIC BLOOD PRESSURE: 87 MMHG | BODY MASS INDEX: 34.36 KG/M2

## 2021-08-03 DIAGNOSIS — C34.02 LUNG CANCER, MAIN BRONCHUS, LEFT: Primary | ICD-10-CM

## 2021-08-03 DIAGNOSIS — C79.51 SECONDARY MALIGNANT NEOPLASM OF BONE: ICD-10-CM

## 2021-08-03 PROCEDURE — 99999 PR PBB SHADOW E&M-EST. PATIENT-LVL III: CPT | Mod: PBBFAC,,, | Performed by: INTERNAL MEDICINE

## 2021-08-03 PROCEDURE — 99999 PR PBB SHADOW E&M-EST. PATIENT-LVL III: ICD-10-PCS | Mod: PBBFAC,,, | Performed by: INTERNAL MEDICINE

## 2021-08-03 PROCEDURE — 99215 PR OFFICE/OUTPT VISIT, EST, LEVL V, 40-54 MIN: ICD-10-PCS | Mod: S$GLB,,, | Performed by: INTERNAL MEDICINE

## 2021-08-03 PROCEDURE — 99215 OFFICE O/P EST HI 40 MIN: CPT | Mod: S$GLB,,, | Performed by: INTERNAL MEDICINE

## 2021-08-04 DIAGNOSIS — C34.02 LUNG CANCER, MAIN BRONCHUS, LEFT: Primary | ICD-10-CM

## 2021-08-06 ENCOUNTER — PATIENT MESSAGE (OUTPATIENT)
Dept: FAMILY MEDICINE | Facility: CLINIC | Age: 61
End: 2021-08-06

## 2021-08-11 ENCOUNTER — OFFICE VISIT (OUTPATIENT)
Dept: FAMILY MEDICINE | Facility: CLINIC | Age: 61
End: 2021-08-11
Payer: COMMERCIAL

## 2021-08-11 ENCOUNTER — TELEPHONE (OUTPATIENT)
Dept: FAMILY MEDICINE | Facility: CLINIC | Age: 61
End: 2021-08-11

## 2021-08-11 DIAGNOSIS — Z00.00 ANNUAL PHYSICAL EXAM: ICD-10-CM

## 2021-08-11 DIAGNOSIS — Z79.899 IMMUNODEFICIENCY DUE TO DRUGS: ICD-10-CM

## 2021-08-11 DIAGNOSIS — C79.51 SECONDARY MALIGNANT NEOPLASM OF BONE: Primary | ICD-10-CM

## 2021-08-11 DIAGNOSIS — D84.821 IMMUNODEFICIENCY DUE TO DRUGS: ICD-10-CM

## 2021-08-11 PROCEDURE — 99442 PR PHYSICIAN TELEPHONE EVALUATION 11-20 MIN: ICD-10-PCS | Mod: 95,,, | Performed by: FAMILY MEDICINE

## 2021-08-11 PROCEDURE — 99442 PR PHYSICIAN TELEPHONE EVALUATION 11-20 MIN: CPT | Mod: 95,,, | Performed by: FAMILY MEDICINE

## 2021-08-11 NOTE — TELEPHONE ENCOUNTER
----- Message from Martha Romeo sent at 8/11/2021 12:19 PM CDT -----  Type:  Patient Returning Call    Who Called:  Self     Who Left Message for Patient: unknown     Does the patient know what this is regarding?: appt today at 4     Would the patient rather a call back or a response via [facility]?  Call     Best Call Back Number:.[phone] (home)

## 2021-08-17 ENCOUNTER — INFUSION (OUTPATIENT)
Dept: INFUSION THERAPY | Facility: HOSPITAL | Age: 61
End: 2021-08-17
Attending: INTERNAL MEDICINE
Payer: COMMERCIAL

## 2021-08-17 ENCOUNTER — OFFICE VISIT (OUTPATIENT)
Dept: HEMATOLOGY/ONCOLOGY | Facility: CLINIC | Age: 61
End: 2021-08-17
Payer: COMMERCIAL

## 2021-08-17 ENCOUNTER — LAB VISIT (OUTPATIENT)
Dept: LAB | Facility: HOSPITAL | Age: 61
End: 2021-08-17
Attending: INTERNAL MEDICINE

## 2021-08-17 VITALS
WEIGHT: 230.81 LBS | DIASTOLIC BLOOD PRESSURE: 81 MMHG | TEMPERATURE: 98 F | TEMPERATURE: 98 F | HEART RATE: 62 BPM | HEIGHT: 70 IN | HEART RATE: 63 BPM | DIASTOLIC BLOOD PRESSURE: 75 MMHG | BODY MASS INDEX: 33.04 KG/M2 | OXYGEN SATURATION: 98 % | OXYGEN SATURATION: 100 % | SYSTOLIC BLOOD PRESSURE: 154 MMHG | SYSTOLIC BLOOD PRESSURE: 134 MMHG | RESPIRATION RATE: 18 BRPM

## 2021-08-17 DIAGNOSIS — Z79.899 IMMUNODEFICIENCY DUE TO DRUGS: ICD-10-CM

## 2021-08-17 DIAGNOSIS — C34.02 LUNG CANCER, MAIN BRONCHUS, LEFT: Primary | ICD-10-CM

## 2021-08-17 DIAGNOSIS — D84.821 IMMUNODEFICIENCY DUE TO DRUGS: ICD-10-CM

## 2021-08-17 DIAGNOSIS — R91.8 MULTIPLE PULMONARY NODULES: ICD-10-CM

## 2021-08-17 DIAGNOSIS — C79.51 SECONDARY MALIGNANT NEOPLASM OF BONE: Primary | ICD-10-CM

## 2021-08-17 DIAGNOSIS — C34.02 LUNG CANCER, MAIN BRONCHUS, LEFT: ICD-10-CM

## 2021-08-17 DIAGNOSIS — C79.51 SECONDARY MALIGNANT NEOPLASM OF BONE: ICD-10-CM

## 2021-08-17 LAB
ALBUMIN SERPL BCP-MCNC: 3.7 G/DL (ref 3.5–5.2)
ALP SERPL-CCNC: 84 U/L (ref 55–135)
ALT SERPL W/O P-5'-P-CCNC: 26 U/L (ref 10–44)
ANION GAP SERPL CALC-SCNC: 8 MMOL/L (ref 8–16)
AST SERPL-CCNC: 20 U/L (ref 10–40)
BILIRUB SERPL-MCNC: 0.2 MG/DL (ref 0.1–1)
BUN SERPL-MCNC: 7 MG/DL (ref 6–20)
CALCIUM SERPL-MCNC: 9.3 MG/DL (ref 8.7–10.5)
CHLORIDE SERPL-SCNC: 106 MMOL/L (ref 95–110)
CO2 SERPL-SCNC: 25 MMOL/L (ref 23–29)
CREAT SERPL-MCNC: 0.8 MG/DL (ref 0.5–1.4)
ERYTHROCYTE [DISTWIDTH] IN BLOOD BY AUTOMATED COUNT: 13.8 % (ref 11.5–14.5)
EST. GFR  (AFRICAN AMERICAN): >60 ML/MIN/1.73 M^2
EST. GFR  (NON AFRICAN AMERICAN): >60 ML/MIN/1.73 M^2
GLUCOSE SERPL-MCNC: 115 MG/DL (ref 70–110)
HCT VFR BLD AUTO: 46.1 % (ref 40–54)
HGB BLD-MCNC: 15.5 G/DL (ref 14–18)
IMM GRANULOCYTES # BLD AUTO: 0.04 K/UL (ref 0–0.04)
MCH RBC QN AUTO: 30.4 PG (ref 27–31)
MCHC RBC AUTO-ENTMCNC: 33.6 G/DL (ref 32–36)
MCV RBC AUTO: 90 FL (ref 82–98)
NEUTROPHILS # BLD AUTO: 4.7 K/UL (ref 1.8–7.7)
PLATELET # BLD AUTO: 198 K/UL (ref 150–450)
PMV BLD AUTO: 9.2 FL (ref 9.2–12.9)
POTASSIUM SERPL-SCNC: 3.9 MMOL/L (ref 3.5–5.1)
PROT SERPL-MCNC: 7.4 G/DL (ref 6–8.4)
RBC # BLD AUTO: 5.1 M/UL (ref 4.6–6.2)
SODIUM SERPL-SCNC: 139 MMOL/L (ref 136–145)
WBC # BLD AUTO: 8.47 K/UL (ref 3.9–12.7)

## 2021-08-17 PROCEDURE — 99215 PR OFFICE/OUTPT VISIT, EST, LEVL V, 40-54 MIN: ICD-10-PCS | Mod: S$PBB,,, | Performed by: INTERNAL MEDICINE

## 2021-08-17 PROCEDURE — 99999 PR PBB SHADOW E&M-EST. PATIENT-LVL III: ICD-10-PCS | Mod: PBBFAC,,, | Performed by: INTERNAL MEDICINE

## 2021-08-17 PROCEDURE — 85027 COMPLETE CBC AUTOMATED: CPT | Performed by: INTERNAL MEDICINE

## 2021-08-17 PROCEDURE — 99215 OFFICE O/P EST HI 40 MIN: CPT | Mod: S$PBB,,, | Performed by: INTERNAL MEDICINE

## 2021-08-17 PROCEDURE — 63600175 PHARM REV CODE 636 W HCPCS: Performed by: INTERNAL MEDICINE

## 2021-08-17 PROCEDURE — 96375 TX/PRO/DX INJ NEW DRUG ADDON: CPT

## 2021-08-17 PROCEDURE — 25000003 PHARM REV CODE 250: Performed by: INTERNAL MEDICINE

## 2021-08-17 PROCEDURE — 96374 THER/PROPH/DIAG INJ IV PUSH: CPT

## 2021-08-17 PROCEDURE — 99999 PR PBB SHADOW E&M-EST. PATIENT-LVL III: CPT | Mod: PBBFAC,,, | Performed by: INTERNAL MEDICINE

## 2021-08-17 PROCEDURE — 96413 CHEMO IV INFUSION 1 HR: CPT

## 2021-08-17 PROCEDURE — 80053 COMPREHEN METABOLIC PANEL: CPT | Performed by: INTERNAL MEDICINE

## 2021-08-17 RX ORDER — HEPARIN 100 UNIT/ML
500 SYRINGE INTRAVENOUS
Status: CANCELLED | OUTPATIENT
Start: 2021-08-17

## 2021-08-17 RX ORDER — ONDANSETRON 2 MG/ML
8 INJECTION INTRAMUSCULAR; INTRAVENOUS
Status: COMPLETED | OUTPATIENT
Start: 2021-08-17 | End: 2021-08-17

## 2021-08-17 RX ORDER — HEPARIN 100 UNIT/ML
500 SYRINGE INTRAVENOUS
Status: DISCONTINUED | OUTPATIENT
Start: 2021-08-17 | End: 2021-08-17 | Stop reason: HOSPADM

## 2021-08-17 RX ORDER — ONDANSETRON 2 MG/ML
8 INJECTION INTRAMUSCULAR; INTRAVENOUS
Status: CANCELLED | OUTPATIENT
Start: 2021-08-17

## 2021-08-17 RX ORDER — SODIUM CHLORIDE 0.9 % (FLUSH) 0.9 %
10 SYRINGE (ML) INJECTION
Status: CANCELLED | OUTPATIENT
Start: 2021-08-17

## 2021-08-17 RX ORDER — SODIUM CHLORIDE 0.9 % (FLUSH) 0.9 %
10 SYRINGE (ML) INJECTION
Status: DISCONTINUED | OUTPATIENT
Start: 2021-08-17 | End: 2021-08-17 | Stop reason: HOSPADM

## 2021-08-17 RX ORDER — ONDANSETRON HYDROCHLORIDE 8 MG/1
8 TABLET, FILM COATED ORAL 4 TIMES DAILY PRN
Qty: 60 TABLET | Refills: 2 | Status: ON HOLD | OUTPATIENT
Start: 2021-08-17 | End: 2022-05-22 | Stop reason: HOSPADM

## 2021-08-17 RX ADMIN — GEMCITABINE 2320 MG: 38 INJECTION, POWDER, LYOPHILIZED, FOR SOLUTION INTRAVENOUS at 08:08

## 2021-08-17 RX ADMIN — ONDANSETRON 8 MG: 2 INJECTION INTRAMUSCULAR; INTRAVENOUS at 08:08

## 2021-08-24 ENCOUNTER — INFUSION (OUTPATIENT)
Dept: INFUSION THERAPY | Facility: HOSPITAL | Age: 61
End: 2021-08-24
Attending: INTERNAL MEDICINE
Payer: COMMERCIAL

## 2021-08-24 VITALS
SYSTOLIC BLOOD PRESSURE: 127 MMHG | HEART RATE: 70 BPM | OXYGEN SATURATION: 99 % | RESPIRATION RATE: 16 BRPM | TEMPERATURE: 98 F | DIASTOLIC BLOOD PRESSURE: 69 MMHG

## 2021-08-24 DIAGNOSIS — R91.8 MULTIPLE PULMONARY NODULES: ICD-10-CM

## 2021-08-24 DIAGNOSIS — C79.51 SECONDARY MALIGNANT NEOPLASM OF BONE: Primary | ICD-10-CM

## 2021-08-24 DIAGNOSIS — C34.02 LUNG CANCER, MAIN BRONCHUS, LEFT: ICD-10-CM

## 2021-08-24 PROCEDURE — 25000003 PHARM REV CODE 250: Performed by: INTERNAL MEDICINE

## 2021-08-24 PROCEDURE — 96413 CHEMO IV INFUSION 1 HR: CPT

## 2021-08-24 PROCEDURE — 63600175 PHARM REV CODE 636 W HCPCS: Performed by: INTERNAL MEDICINE

## 2021-08-24 RX ORDER — ONDANSETRON 2 MG/ML
8 INJECTION INTRAMUSCULAR; INTRAVENOUS
Status: COMPLETED | OUTPATIENT
Start: 2021-08-24 | End: 2021-08-24

## 2021-08-24 RX ORDER — SODIUM CHLORIDE 0.9 % (FLUSH) 0.9 %
10 SYRINGE (ML) INJECTION
Status: CANCELLED | OUTPATIENT
Start: 2021-08-24

## 2021-08-24 RX ORDER — HEPARIN 100 UNIT/ML
500 SYRINGE INTRAVENOUS
Status: CANCELLED | OUTPATIENT
Start: 2021-08-24

## 2021-08-24 RX ORDER — ONDANSETRON 2 MG/ML
8 INJECTION INTRAMUSCULAR; INTRAVENOUS
Status: CANCELLED | OUTPATIENT
Start: 2021-08-24

## 2021-08-24 RX ADMIN — ONDANSETRON 8 MG: 2 INJECTION INTRAMUSCULAR; INTRAVENOUS at 10:08

## 2021-08-24 RX ADMIN — GEMCITABINE 2320 MG: 1 INJECTION, POWDER, LYOPHILIZED, FOR SOLUTION INTRAVENOUS at 10:08

## 2021-09-07 ENCOUNTER — INFUSION (OUTPATIENT)
Dept: INFUSION THERAPY | Facility: HOSPITAL | Age: 61
End: 2021-09-07
Attending: INTERNAL MEDICINE
Payer: COMMERCIAL

## 2021-09-07 VITALS
TEMPERATURE: 98 F | OXYGEN SATURATION: 99 % | RESPIRATION RATE: 16 BRPM | HEART RATE: 70 BPM | DIASTOLIC BLOOD PRESSURE: 77 MMHG | SYSTOLIC BLOOD PRESSURE: 132 MMHG

## 2021-09-07 DIAGNOSIS — C79.51 SECONDARY MALIGNANT NEOPLASM OF BONE: ICD-10-CM

## 2021-09-07 DIAGNOSIS — R91.8 MULTIPLE PULMONARY NODULES: ICD-10-CM

## 2021-09-07 DIAGNOSIS — C34.02 LUNG CANCER, MAIN BRONCHUS, LEFT: Primary | ICD-10-CM

## 2021-09-07 LAB
ALBUMIN SERPL BCP-MCNC: 3.4 G/DL (ref 3.5–5.2)
ALP SERPL-CCNC: 69 U/L (ref 55–135)
ALT SERPL W/O P-5'-P-CCNC: 79 U/L (ref 10–44)
ANION GAP SERPL CALC-SCNC: 10 MMOL/L (ref 8–16)
AST SERPL-CCNC: 49 U/L (ref 10–40)
BILIRUB SERPL-MCNC: 0.3 MG/DL (ref 0.1–1)
BUN SERPL-MCNC: 5 MG/DL (ref 6–20)
CALCIUM SERPL-MCNC: 9.3 MG/DL (ref 8.7–10.5)
CHLORIDE SERPL-SCNC: 106 MMOL/L (ref 95–110)
CO2 SERPL-SCNC: 23 MMOL/L (ref 23–29)
CREAT SERPL-MCNC: 0.8 MG/DL (ref 0.5–1.4)
ERYTHROCYTE [DISTWIDTH] IN BLOOD BY AUTOMATED COUNT: 15.5 % (ref 11.5–14.5)
EST. GFR  (AFRICAN AMERICAN): >60 ML/MIN/1.73 M^2
EST. GFR  (NON AFRICAN AMERICAN): >60 ML/MIN/1.73 M^2
GLUCOSE SERPL-MCNC: 105 MG/DL (ref 70–110)
HCT VFR BLD AUTO: 39.8 % (ref 40–54)
HGB BLD-MCNC: 13.6 G/DL (ref 14–18)
IMM GRANULOCYTES # BLD AUTO: 0.05 K/UL (ref 0–0.04)
MCH RBC QN AUTO: 30.9 PG (ref 27–31)
MCHC RBC AUTO-ENTMCNC: 34.2 G/DL (ref 32–36)
MCV RBC AUTO: 91 FL (ref 82–98)
NEUTROPHILS # BLD AUTO: 5.7 K/UL (ref 1.8–7.7)
PLATELET # BLD AUTO: 452 K/UL (ref 150–450)
PMV BLD AUTO: 9.9 FL (ref 9.2–12.9)
POTASSIUM SERPL-SCNC: 4.5 MMOL/L (ref 3.5–5.1)
PROT SERPL-MCNC: 7.6 G/DL (ref 6–8.4)
RBC # BLD AUTO: 4.4 M/UL (ref 4.6–6.2)
SODIUM SERPL-SCNC: 139 MMOL/L (ref 136–145)
WBC # BLD AUTO: 8.91 K/UL (ref 3.9–12.7)

## 2021-09-07 PROCEDURE — 85027 COMPLETE CBC AUTOMATED: CPT | Performed by: INTERNAL MEDICINE

## 2021-09-07 PROCEDURE — 80053 COMPREHEN METABOLIC PANEL: CPT | Performed by: INTERNAL MEDICINE

## 2021-09-07 PROCEDURE — 96375 TX/PRO/DX INJ NEW DRUG ADDON: CPT

## 2021-09-07 PROCEDURE — 63600175 PHARM REV CODE 636 W HCPCS: Performed by: INTERNAL MEDICINE

## 2021-09-07 PROCEDURE — 96413 CHEMO IV INFUSION 1 HR: CPT

## 2021-09-07 PROCEDURE — 25000003 PHARM REV CODE 250: Performed by: INTERNAL MEDICINE

## 2021-09-07 PROCEDURE — 36591 DRAW BLOOD OFF VENOUS DEVICE: CPT

## 2021-09-07 RX ORDER — SODIUM CHLORIDE 0.9 % (FLUSH) 0.9 %
10 SYRINGE (ML) INJECTION
Status: CANCELLED | OUTPATIENT
Start: 2021-09-07

## 2021-09-07 RX ORDER — HEPARIN 100 UNIT/ML
500 SYRINGE INTRAVENOUS
Status: CANCELLED | OUTPATIENT
Start: 2021-09-07

## 2021-09-07 RX ORDER — ONDANSETRON 2 MG/ML
8 INJECTION INTRAMUSCULAR; INTRAVENOUS
Status: COMPLETED | OUTPATIENT
Start: 2021-09-07 | End: 2021-09-07

## 2021-09-07 RX ORDER — ONDANSETRON 2 MG/ML
8 INJECTION INTRAMUSCULAR; INTRAVENOUS
Status: CANCELLED | OUTPATIENT
Start: 2021-09-07

## 2021-09-07 RX ADMIN — GEMCITABINE HYDROCHLORIDE 2320 MG: 200 INJECTION, POWDER, LYOPHILIZED, FOR SOLUTION INTRAVENOUS at 04:09

## 2021-09-07 RX ADMIN — ONDANSETRON 8 MG: 2 INJECTION INTRAMUSCULAR; INTRAVENOUS at 04:09

## 2021-09-14 ENCOUNTER — OFFICE VISIT (OUTPATIENT)
Dept: HEMATOLOGY/ONCOLOGY | Facility: CLINIC | Age: 61
End: 2021-09-14
Payer: COMMERCIAL

## 2021-09-14 ENCOUNTER — INFUSION (OUTPATIENT)
Dept: INFUSION THERAPY | Facility: HOSPITAL | Age: 61
End: 2021-09-14
Attending: INTERNAL MEDICINE
Payer: COMMERCIAL

## 2021-09-14 ENCOUNTER — LAB VISIT (OUTPATIENT)
Dept: LAB | Facility: HOSPITAL | Age: 61
End: 2021-09-14
Attending: INTERNAL MEDICINE
Payer: COMMERCIAL

## 2021-09-14 VITALS
HEIGHT: 70 IN | TEMPERATURE: 98 F | SYSTOLIC BLOOD PRESSURE: 168 MMHG | HEART RATE: 67 BPM | OXYGEN SATURATION: 96 % | BODY MASS INDEX: 33.04 KG/M2 | DIASTOLIC BLOOD PRESSURE: 87 MMHG | WEIGHT: 230.81 LBS

## 2021-09-14 VITALS
OXYGEN SATURATION: 98 % | RESPIRATION RATE: 16 BRPM | TEMPERATURE: 99 F | DIASTOLIC BLOOD PRESSURE: 67 MMHG | SYSTOLIC BLOOD PRESSURE: 115 MMHG | HEART RATE: 62 BPM

## 2021-09-14 DIAGNOSIS — C34.02 LUNG CANCER, MAIN BRONCHUS, LEFT: Primary | ICD-10-CM

## 2021-09-14 DIAGNOSIS — C79.51 SECONDARY MALIGNANT NEOPLASM OF BONE: ICD-10-CM

## 2021-09-14 DIAGNOSIS — Z79.899 IMMUNODEFICIENCY DUE TO DRUGS: ICD-10-CM

## 2021-09-14 DIAGNOSIS — C34.02 LUNG CANCER, MAIN BRONCHUS, LEFT: ICD-10-CM

## 2021-09-14 DIAGNOSIS — C79.51 SECONDARY MALIGNANT NEOPLASM OF BONE: Primary | ICD-10-CM

## 2021-09-14 DIAGNOSIS — D84.821 IMMUNODEFICIENCY DUE TO DRUGS: ICD-10-CM

## 2021-09-14 DIAGNOSIS — R79.89 ELEVATED LIVER FUNCTION TESTS: ICD-10-CM

## 2021-09-14 DIAGNOSIS — R91.8 MULTIPLE PULMONARY NODULES: ICD-10-CM

## 2021-09-14 LAB
ALBUMIN SERPL BCP-MCNC: 3.3 G/DL (ref 3.5–5.2)
ALP SERPL-CCNC: 84 U/L (ref 55–135)
ALT SERPL W/O P-5'-P-CCNC: 86 U/L (ref 10–44)
ANION GAP SERPL CALC-SCNC: 6 MMOL/L (ref 8–16)
AST SERPL-CCNC: 56 U/L (ref 10–40)
BILIRUB SERPL-MCNC: 0.3 MG/DL (ref 0.1–1)
BUN SERPL-MCNC: 9 MG/DL (ref 6–20)
CALCIUM SERPL-MCNC: 9.8 MG/DL (ref 8.7–10.5)
CHLORIDE SERPL-SCNC: 106 MMOL/L (ref 95–110)
CO2 SERPL-SCNC: 27 MMOL/L (ref 23–29)
CREAT SERPL-MCNC: 0.8 MG/DL (ref 0.5–1.4)
ERYTHROCYTE [DISTWIDTH] IN BLOOD BY AUTOMATED COUNT: 15.3 % (ref 11.5–14.5)
EST. GFR  (AFRICAN AMERICAN): >60 ML/MIN/1.73 M^2
EST. GFR  (NON AFRICAN AMERICAN): >60 ML/MIN/1.73 M^2
GLUCOSE SERPL-MCNC: 138 MG/DL (ref 70–110)
HCT VFR BLD AUTO: 38 % (ref 40–54)
HGB BLD-MCNC: 12.9 G/DL (ref 14–18)
IMM GRANULOCYTES # BLD AUTO: 0.07 K/UL (ref 0–0.04)
MCH RBC QN AUTO: 30.9 PG (ref 27–31)
MCHC RBC AUTO-ENTMCNC: 33.9 G/DL (ref 32–36)
MCV RBC AUTO: 91 FL (ref 82–98)
NEUTROPHILS # BLD AUTO: 1.5 K/UL (ref 1.8–7.7)
PLATELET # BLD AUTO: 259 K/UL (ref 150–450)
PMV BLD AUTO: 9.2 FL (ref 9.2–12.9)
POTASSIUM SERPL-SCNC: 4.2 MMOL/L (ref 3.5–5.1)
PROT SERPL-MCNC: 7.1 G/DL (ref 6–8.4)
RBC # BLD AUTO: 4.18 M/UL (ref 4.6–6.2)
SODIUM SERPL-SCNC: 139 MMOL/L (ref 136–145)
WBC # BLD AUTO: 4.06 K/UL (ref 3.9–12.7)

## 2021-09-14 PROCEDURE — 36415 COLL VENOUS BLD VENIPUNCTURE: CPT | Performed by: INTERNAL MEDICINE

## 2021-09-14 PROCEDURE — 99999 PR PBB SHADOW E&M-EST. PATIENT-LVL III: ICD-10-PCS | Mod: PBBFAC,,, | Performed by: INTERNAL MEDICINE

## 2021-09-14 PROCEDURE — 99999 PR PBB SHADOW E&M-EST. PATIENT-LVL III: CPT | Mod: PBBFAC,,, | Performed by: INTERNAL MEDICINE

## 2021-09-14 PROCEDURE — 99215 OFFICE O/P EST HI 40 MIN: CPT | Mod: S$PBB,,, | Performed by: INTERNAL MEDICINE

## 2021-09-14 PROCEDURE — 25000003 PHARM REV CODE 250: Performed by: INTERNAL MEDICINE

## 2021-09-14 PROCEDURE — 99215 PR OFFICE/OUTPT VISIT, EST, LEVL V, 40-54 MIN: ICD-10-PCS | Mod: S$PBB,,, | Performed by: INTERNAL MEDICINE

## 2021-09-14 PROCEDURE — 85027 COMPLETE CBC AUTOMATED: CPT | Performed by: INTERNAL MEDICINE

## 2021-09-14 PROCEDURE — 63600175 PHARM REV CODE 636 W HCPCS: Performed by: INTERNAL MEDICINE

## 2021-09-14 PROCEDURE — 80053 COMPREHEN METABOLIC PANEL: CPT | Performed by: INTERNAL MEDICINE

## 2021-09-14 RX ORDER — SODIUM CHLORIDE 0.9 % (FLUSH) 0.9 %
10 SYRINGE (ML) INJECTION
Status: CANCELLED | OUTPATIENT
Start: 2021-09-14

## 2021-09-14 RX ORDER — ONDANSETRON 2 MG/ML
8 INJECTION INTRAMUSCULAR; INTRAVENOUS
Status: CANCELLED | OUTPATIENT
Start: 2021-09-14

## 2021-09-14 RX ORDER — ONDANSETRON 2 MG/ML
8 INJECTION INTRAMUSCULAR; INTRAVENOUS
Status: COMPLETED | OUTPATIENT
Start: 2021-09-14 | End: 2021-09-14

## 2021-09-14 RX ORDER — HEPARIN 100 UNIT/ML
500 SYRINGE INTRAVENOUS
Status: CANCELLED | OUTPATIENT
Start: 2021-09-14

## 2021-09-14 RX ADMIN — GEMCITABINE HYDROCHLORIDE 2320 MG: 200 INJECTION, POWDER, LYOPHILIZED, FOR SOLUTION INTRAVENOUS at 03:09

## 2021-09-14 RX ADMIN — ONDANSETRON 8 MG: 2 INJECTION INTRAMUSCULAR; INTRAVENOUS at 03:09

## 2021-09-24 ENCOUNTER — HOSPITAL ENCOUNTER (OUTPATIENT)
Dept: RADIOLOGY | Facility: HOSPITAL | Age: 61
Discharge: HOME | End: 2021-09-24
Attending: INTERNAL MEDICINE
Payer: COMMERCIAL

## 2021-09-24 DIAGNOSIS — C34.02 LUNG CANCER, MAIN BRONCHUS, LEFT: ICD-10-CM

## 2021-09-24 PROCEDURE — 71260 CT CHEST ABDOMEN PELVIS WITH CONTRAST (XPD): ICD-10-PCS | Mod: 26,,, | Performed by: RADIOLOGY

## 2021-09-24 PROCEDURE — 74177 CT CHEST ABDOMEN PELVIS WITH CONTRAST (XPD): ICD-10-PCS | Mod: 26,,, | Performed by: RADIOLOGY

## 2021-09-24 PROCEDURE — 25500020 PHARM REV CODE 255: Performed by: INTERNAL MEDICINE

## 2021-09-24 PROCEDURE — 71260 CT THORAX DX C+: CPT | Mod: TC

## 2021-09-24 PROCEDURE — 74177 CT ABD & PELVIS W/CONTRAST: CPT | Mod: 26,,, | Performed by: RADIOLOGY

## 2021-09-24 PROCEDURE — 71260 CT THORAX DX C+: CPT | Mod: 26,,, | Performed by: RADIOLOGY

## 2021-09-24 PROCEDURE — 74177 CT ABD & PELVIS W/CONTRAST: CPT | Mod: TC

## 2021-09-24 RX ADMIN — IOHEXOL 15 ML: 300 INJECTION, SOLUTION INTRAVENOUS at 12:09

## 2021-09-24 RX ADMIN — IOHEXOL 100 ML: 350 INJECTION, SOLUTION INTRAVENOUS at 02:09

## 2021-09-28 ENCOUNTER — OFFICE VISIT (OUTPATIENT)
Dept: HEMATOLOGY/ONCOLOGY | Facility: CLINIC | Age: 61
End: 2021-09-28
Payer: COMMERCIAL

## 2021-09-28 ENCOUNTER — LAB VISIT (OUTPATIENT)
Dept: LAB | Facility: HOSPITAL | Age: 61
End: 2021-09-28
Attending: INTERNAL MEDICINE

## 2021-09-28 ENCOUNTER — INFUSION (OUTPATIENT)
Dept: INFUSION THERAPY | Facility: HOSPITAL | Age: 61
End: 2021-09-28
Attending: INTERNAL MEDICINE
Payer: COMMERCIAL

## 2021-09-28 VITALS
HEART RATE: 71 BPM | TEMPERATURE: 98 F | DIASTOLIC BLOOD PRESSURE: 75 MMHG | SYSTOLIC BLOOD PRESSURE: 127 MMHG | OXYGEN SATURATION: 98 % | RESPIRATION RATE: 16 BRPM

## 2021-09-28 VITALS
HEIGHT: 70 IN | TEMPERATURE: 99 F | WEIGHT: 225.75 LBS | HEART RATE: 74 BPM | DIASTOLIC BLOOD PRESSURE: 89 MMHG | OXYGEN SATURATION: 98 % | SYSTOLIC BLOOD PRESSURE: 143 MMHG | BODY MASS INDEX: 32.32 KG/M2

## 2021-09-28 DIAGNOSIS — R91.8 MULTIPLE PULMONARY NODULES: ICD-10-CM

## 2021-09-28 DIAGNOSIS — C79.51 SECONDARY MALIGNANT NEOPLASM OF BONE: Primary | ICD-10-CM

## 2021-09-28 DIAGNOSIS — Z79.899 IMMUNODEFICIENCY DUE TO DRUGS: ICD-10-CM

## 2021-09-28 DIAGNOSIS — C79.51 SECONDARY MALIGNANT NEOPLASM OF BONE: ICD-10-CM

## 2021-09-28 DIAGNOSIS — C34.02 LUNG CANCER, MAIN BRONCHUS, LEFT: ICD-10-CM

## 2021-09-28 DIAGNOSIS — D84.821 IMMUNODEFICIENCY DUE TO DRUGS: ICD-10-CM

## 2021-09-28 DIAGNOSIS — C34.02 LUNG CANCER, MAIN BRONCHUS, LEFT: Primary | ICD-10-CM

## 2021-09-28 LAB
ALBUMIN SERPL BCP-MCNC: 3.4 G/DL (ref 3.5–5.2)
ALP SERPL-CCNC: 86 U/L (ref 55–135)
ALT SERPL W/O P-5'-P-CCNC: 36 U/L (ref 10–44)
ANION GAP SERPL CALC-SCNC: 10 MMOL/L (ref 8–16)
AST SERPL-CCNC: 26 U/L (ref 10–40)
BILIRUB SERPL-MCNC: 0.2 MG/DL (ref 0.1–1)
BUN SERPL-MCNC: 12 MG/DL (ref 6–20)
CALCIUM SERPL-MCNC: 9.8 MG/DL (ref 8.7–10.5)
CHLORIDE SERPL-SCNC: 106 MMOL/L (ref 95–110)
CO2 SERPL-SCNC: 24 MMOL/L (ref 23–29)
CREAT SERPL-MCNC: 0.9 MG/DL (ref 0.5–1.4)
ERYTHROCYTE [DISTWIDTH] IN BLOOD BY AUTOMATED COUNT: 18.2 % (ref 11.5–14.5)
EST. GFR  (AFRICAN AMERICAN): >60 ML/MIN/1.73 M^2
EST. GFR  (NON AFRICAN AMERICAN): >60 ML/MIN/1.73 M^2
GLUCOSE SERPL-MCNC: 132 MG/DL (ref 70–110)
HCT VFR BLD AUTO: 38.7 % (ref 40–54)
HGB BLD-MCNC: 13.2 G/DL (ref 14–18)
IMM GRANULOCYTES # BLD AUTO: 0.06 K/UL (ref 0–0.04)
MCH RBC QN AUTO: 32.4 PG (ref 27–31)
MCHC RBC AUTO-ENTMCNC: 34.1 G/DL (ref 32–36)
MCV RBC AUTO: 95 FL (ref 82–98)
NEUTROPHILS # BLD AUTO: 5.8 K/UL (ref 1.8–7.7)
PLATELET # BLD AUTO: 316 K/UL (ref 150–450)
PMV BLD AUTO: 9.3 FL (ref 9.2–12.9)
POTASSIUM SERPL-SCNC: 3.9 MMOL/L (ref 3.5–5.1)
PROT SERPL-MCNC: 7.4 G/DL (ref 6–8.4)
RBC # BLD AUTO: 4.07 M/UL (ref 4.6–6.2)
SODIUM SERPL-SCNC: 140 MMOL/L (ref 136–145)
WBC # BLD AUTO: 9.25 K/UL (ref 3.9–12.7)

## 2021-09-28 PROCEDURE — 99215 OFFICE O/P EST HI 40 MIN: CPT | Mod: S$PBB,,, | Performed by: INTERNAL MEDICINE

## 2021-09-28 PROCEDURE — 80053 COMPREHEN METABOLIC PANEL: CPT | Performed by: INTERNAL MEDICINE

## 2021-09-28 PROCEDURE — 96374 THER/PROPH/DIAG INJ IV PUSH: CPT

## 2021-09-28 PROCEDURE — 63600175 PHARM REV CODE 636 W HCPCS: Performed by: INTERNAL MEDICINE

## 2021-09-28 PROCEDURE — 96413 CHEMO IV INFUSION 1 HR: CPT

## 2021-09-28 PROCEDURE — 99999 PR PBB SHADOW E&M-EST. PATIENT-LVL III: CPT | Mod: PBBFAC,,, | Performed by: INTERNAL MEDICINE

## 2021-09-28 PROCEDURE — 99215 PR OFFICE/OUTPT VISIT, EST, LEVL V, 40-54 MIN: ICD-10-PCS | Mod: S$PBB,,, | Performed by: INTERNAL MEDICINE

## 2021-09-28 PROCEDURE — 85027 COMPLETE CBC AUTOMATED: CPT | Performed by: INTERNAL MEDICINE

## 2021-09-28 PROCEDURE — 36415 COLL VENOUS BLD VENIPUNCTURE: CPT | Performed by: INTERNAL MEDICINE

## 2021-09-28 PROCEDURE — 96375 TX/PRO/DX INJ NEW DRUG ADDON: CPT

## 2021-09-28 PROCEDURE — 99999 PR PBB SHADOW E&M-EST. PATIENT-LVL III: ICD-10-PCS | Mod: PBBFAC,,, | Performed by: INTERNAL MEDICINE

## 2021-09-28 PROCEDURE — 25000003 PHARM REV CODE 250: Performed by: INTERNAL MEDICINE

## 2021-09-28 RX ORDER — ONDANSETRON 2 MG/ML
8 INJECTION INTRAMUSCULAR; INTRAVENOUS
Status: CANCELLED | OUTPATIENT
Start: 2021-09-28

## 2021-09-28 RX ORDER — SODIUM CHLORIDE 0.9 % (FLUSH) 0.9 %
10 SYRINGE (ML) INJECTION
Status: CANCELLED | OUTPATIENT
Start: 2021-09-28

## 2021-09-28 RX ORDER — ONDANSETRON 2 MG/ML
8 INJECTION INTRAMUSCULAR; INTRAVENOUS
Status: COMPLETED | OUTPATIENT
Start: 2021-09-28 | End: 2021-09-28

## 2021-09-28 RX ORDER — HEPARIN 100 UNIT/ML
500 SYRINGE INTRAVENOUS
Status: CANCELLED | OUTPATIENT
Start: 2021-09-28

## 2021-09-28 RX ADMIN — GEMCITABINE HYDROCHLORIDE 2320 MG: 2 INJECTION, SOLUTION INTRAVENOUS at 10:09

## 2021-09-28 RX ADMIN — ONDANSETRON 8 MG: 2 INJECTION INTRAMUSCULAR; INTRAVENOUS at 10:09

## 2021-10-05 ENCOUNTER — INFUSION (OUTPATIENT)
Dept: INFUSION THERAPY | Facility: HOSPITAL | Age: 61
End: 2021-10-05
Attending: INTERNAL MEDICINE

## 2021-10-05 ENCOUNTER — LAB VISIT (OUTPATIENT)
Dept: LAB | Facility: HOSPITAL | Age: 61
End: 2021-10-05
Attending: INTERNAL MEDICINE

## 2021-10-05 VITALS
SYSTOLIC BLOOD PRESSURE: 133 MMHG | DIASTOLIC BLOOD PRESSURE: 74 MMHG | HEART RATE: 75 BPM | OXYGEN SATURATION: 97 % | TEMPERATURE: 98 F | RESPIRATION RATE: 16 BRPM

## 2021-10-05 DIAGNOSIS — C34.02 LUNG CANCER, MAIN BRONCHUS, LEFT: ICD-10-CM

## 2021-10-05 DIAGNOSIS — R91.8 MULTIPLE PULMONARY NODULES: ICD-10-CM

## 2021-10-05 DIAGNOSIS — C79.51 SECONDARY MALIGNANT NEOPLASM OF BONE: Primary | ICD-10-CM

## 2021-10-05 LAB
ALBUMIN SERPL BCP-MCNC: 3.3 G/DL (ref 3.5–5.2)
ALP SERPL-CCNC: 93 U/L (ref 55–135)
ALT SERPL W/O P-5'-P-CCNC: 46 U/L (ref 10–44)
ANION GAP SERPL CALC-SCNC: 7 MMOL/L (ref 8–16)
AST SERPL-CCNC: 32 U/L (ref 10–40)
BILIRUB SERPL-MCNC: 0.3 MG/DL (ref 0.1–1)
BUN SERPL-MCNC: 9 MG/DL (ref 6–20)
CALCIUM SERPL-MCNC: 9.3 MG/DL (ref 8.7–10.5)
CHLORIDE SERPL-SCNC: 108 MMOL/L (ref 95–110)
CO2 SERPL-SCNC: 25 MMOL/L (ref 23–29)
CREAT SERPL-MCNC: 0.8 MG/DL (ref 0.5–1.4)
ERYTHROCYTE [DISTWIDTH] IN BLOOD BY AUTOMATED COUNT: 17.3 % (ref 11.5–14.5)
EST. GFR  (AFRICAN AMERICAN): >60 ML/MIN/1.73 M^2
EST. GFR  (NON AFRICAN AMERICAN): >60 ML/MIN/1.73 M^2
GLUCOSE SERPL-MCNC: 175 MG/DL (ref 70–110)
HCT VFR BLD AUTO: 34.7 % (ref 40–54)
HGB BLD-MCNC: 12 G/DL (ref 14–18)
IMM GRANULOCYTES # BLD AUTO: 0.18 K/UL (ref 0–0.04)
MCH RBC QN AUTO: 32.8 PG (ref 27–31)
MCHC RBC AUTO-ENTMCNC: 34.6 G/DL (ref 32–36)
MCV RBC AUTO: 95 FL (ref 82–98)
NEUTROPHILS # BLD AUTO: 2.3 K/UL (ref 1.8–7.7)
PLATELET # BLD AUTO: 278 K/UL (ref 150–450)
PMV BLD AUTO: 9.4 FL (ref 9.2–12.9)
POTASSIUM SERPL-SCNC: 3.8 MMOL/L (ref 3.5–5.1)
PROT SERPL-MCNC: 7 G/DL (ref 6–8.4)
RBC # BLD AUTO: 3.66 M/UL (ref 4.6–6.2)
SODIUM SERPL-SCNC: 140 MMOL/L (ref 136–145)
WBC # BLD AUTO: 4.78 K/UL (ref 3.9–12.7)

## 2021-10-05 PROCEDURE — 80053 COMPREHEN METABOLIC PANEL: CPT | Performed by: INTERNAL MEDICINE

## 2021-10-05 PROCEDURE — 25000003 PHARM REV CODE 250: Performed by: INTERNAL MEDICINE

## 2021-10-05 PROCEDURE — 63600175 PHARM REV CODE 636 W HCPCS: Performed by: INTERNAL MEDICINE

## 2021-10-05 PROCEDURE — 96374 THER/PROPH/DIAG INJ IV PUSH: CPT

## 2021-10-05 PROCEDURE — 85027 COMPLETE CBC AUTOMATED: CPT | Performed by: INTERNAL MEDICINE

## 2021-10-05 PROCEDURE — 36415 COLL VENOUS BLD VENIPUNCTURE: CPT | Performed by: INTERNAL MEDICINE

## 2021-10-05 PROCEDURE — 96413 CHEMO IV INFUSION 1 HR: CPT

## 2021-10-05 RX ORDER — ONDANSETRON 2 MG/ML
8 INJECTION INTRAMUSCULAR; INTRAVENOUS
Status: CANCELLED | OUTPATIENT
Start: 2021-10-05

## 2021-10-05 RX ORDER — SODIUM CHLORIDE 0.9 % (FLUSH) 0.9 %
10 SYRINGE (ML) INJECTION
Status: CANCELLED | OUTPATIENT
Start: 2021-10-05

## 2021-10-05 RX ORDER — ONDANSETRON 2 MG/ML
8 INJECTION INTRAMUSCULAR; INTRAVENOUS
Status: COMPLETED | OUTPATIENT
Start: 2021-10-05 | End: 2021-10-05

## 2021-10-05 RX ORDER — HEPARIN 100 UNIT/ML
500 SYRINGE INTRAVENOUS
Status: CANCELLED | OUTPATIENT
Start: 2021-10-05

## 2021-10-05 RX ADMIN — ONDANSETRON 8 MG: 2 INJECTION INTRAMUSCULAR; INTRAVENOUS at 04:10

## 2021-10-05 RX ADMIN — GEMCITABINE HYDROCHLORIDE 2320 MG: 2 INJECTION, SOLUTION INTRAVENOUS at 04:10

## 2021-10-15 ENCOUNTER — HOSPITAL ENCOUNTER (OUTPATIENT)
Dept: RADIOLOGY | Facility: HOSPITAL | Age: 61
Discharge: HOME | End: 2021-10-15
Attending: STUDENT IN AN ORGANIZED HEALTH CARE EDUCATION/TRAINING PROGRAM
Payer: COMMERCIAL

## 2021-10-15 ENCOUNTER — OFFICE VISIT (OUTPATIENT)
Dept: HEMATOLOGY/ONCOLOGY | Facility: CLINIC | Age: 61
End: 2021-10-15
Payer: COMMERCIAL

## 2021-10-15 VITALS
DIASTOLIC BLOOD PRESSURE: 80 MMHG | WEIGHT: 229.94 LBS | HEART RATE: 74 BPM | OXYGEN SATURATION: 98 % | BODY MASS INDEX: 32.92 KG/M2 | TEMPERATURE: 98 F | HEIGHT: 70 IN | SYSTOLIC BLOOD PRESSURE: 150 MMHG

## 2021-10-15 DIAGNOSIS — M79.604 RIGHT LEG PAIN: ICD-10-CM

## 2021-10-15 DIAGNOSIS — D63.8 ANEMIA, CHRONIC DISEASE: ICD-10-CM

## 2021-10-15 DIAGNOSIS — C79.51 SECONDARY MALIGNANT NEOPLASM OF BONE: ICD-10-CM

## 2021-10-15 DIAGNOSIS — C34.02 LUNG CANCER, MAIN BRONCHUS, LEFT: Primary | ICD-10-CM

## 2021-10-15 DIAGNOSIS — C34.02 LUNG CANCER, MAIN BRONCHUS, LEFT: ICD-10-CM

## 2021-10-15 PROCEDURE — 93971 EXTREMITY STUDY: CPT | Mod: TC,RT

## 2021-10-15 PROCEDURE — 93971 EXTREMITY STUDY: CPT | Mod: 26,RT,, | Performed by: RADIOLOGY

## 2021-10-15 PROCEDURE — 93971 US LOWER EXTREMITY VEINS RIGHT: ICD-10-PCS | Mod: 26,RT,, | Performed by: RADIOLOGY

## 2021-10-15 PROCEDURE — 99999 PR PBB SHADOW E&M-EST. PATIENT-LVL IV: ICD-10-PCS | Mod: PBBFAC,,, | Performed by: STUDENT IN AN ORGANIZED HEALTH CARE EDUCATION/TRAINING PROGRAM

## 2021-10-15 PROCEDURE — 99417 PR PROLONGED SVC, OUTPT, W/WO DIRECT PT CONTACT,  EA ADDTL 15 MIN: ICD-10-PCS | Mod: S$PBB,,, | Performed by: STUDENT IN AN ORGANIZED HEALTH CARE EDUCATION/TRAINING PROGRAM

## 2021-10-15 PROCEDURE — 99417 PROLNG OP E/M EACH 15 MIN: CPT | Mod: S$PBB,,, | Performed by: STUDENT IN AN ORGANIZED HEALTH CARE EDUCATION/TRAINING PROGRAM

## 2021-10-15 PROCEDURE — 99999 PR PBB SHADOW E&M-EST. PATIENT-LVL IV: CPT | Mod: PBBFAC,,, | Performed by: STUDENT IN AN ORGANIZED HEALTH CARE EDUCATION/TRAINING PROGRAM

## 2021-10-15 PROCEDURE — 99215 PR OFFICE/OUTPT VISIT, EST, LEVL V, 40-54 MIN: ICD-10-PCS | Mod: S$PBB,,, | Performed by: STUDENT IN AN ORGANIZED HEALTH CARE EDUCATION/TRAINING PROGRAM

## 2021-10-15 PROCEDURE — 99215 OFFICE O/P EST HI 40 MIN: CPT | Mod: S$PBB,,, | Performed by: STUDENT IN AN ORGANIZED HEALTH CARE EDUCATION/TRAINING PROGRAM

## 2021-10-15 RX ORDER — ONDANSETRON 2 MG/ML
8 INJECTION INTRAMUSCULAR; INTRAVENOUS
Status: CANCELLED | OUTPATIENT
Start: 2021-10-19

## 2021-10-15 RX ORDER — HEPARIN 100 UNIT/ML
500 SYRINGE INTRAVENOUS
Status: CANCELLED | OUTPATIENT
Start: 2021-10-19

## 2021-10-15 RX ORDER — SODIUM CHLORIDE 0.9 % (FLUSH) 0.9 %
10 SYRINGE (ML) INJECTION
Status: CANCELLED | OUTPATIENT
Start: 2021-10-19

## 2021-10-16 PROBLEM — J18.9 PNEUMONIA: Status: RESOLVED | Noted: 2019-08-22 | Resolved: 2021-10-16

## 2021-10-16 PROBLEM — M79.604 RIGHT LEG PAIN: Status: ACTIVE | Noted: 2021-10-16

## 2021-10-19 ENCOUNTER — INFUSION (OUTPATIENT)
Dept: INFUSION THERAPY | Facility: HOSPITAL | Age: 61
End: 2021-10-19
Attending: INTERNAL MEDICINE

## 2021-10-19 VITALS
SYSTOLIC BLOOD PRESSURE: 128 MMHG | DIASTOLIC BLOOD PRESSURE: 71 MMHG | OXYGEN SATURATION: 98 % | HEART RATE: 73 BPM | TEMPERATURE: 99 F | RESPIRATION RATE: 16 BRPM

## 2021-10-19 DIAGNOSIS — R91.8 MULTIPLE PULMONARY NODULES: ICD-10-CM

## 2021-10-19 DIAGNOSIS — C34.02 LUNG CANCER, MAIN BRONCHUS, LEFT: ICD-10-CM

## 2021-10-19 DIAGNOSIS — C79.51 SECONDARY MALIGNANT NEOPLASM OF BONE: Primary | ICD-10-CM

## 2021-10-19 PROCEDURE — 63600175 PHARM REV CODE 636 W HCPCS: Performed by: STUDENT IN AN ORGANIZED HEALTH CARE EDUCATION/TRAINING PROGRAM

## 2021-10-19 PROCEDURE — 25000003 PHARM REV CODE 250: Performed by: STUDENT IN AN ORGANIZED HEALTH CARE EDUCATION/TRAINING PROGRAM

## 2021-10-19 PROCEDURE — 96413 CHEMO IV INFUSION 1 HR: CPT

## 2021-10-19 PROCEDURE — 96374 THER/PROPH/DIAG INJ IV PUSH: CPT

## 2021-10-19 RX ORDER — ONDANSETRON 2 MG/ML
8 INJECTION INTRAMUSCULAR; INTRAVENOUS
Status: COMPLETED | OUTPATIENT
Start: 2021-10-19 | End: 2021-10-19

## 2021-10-19 RX ADMIN — ONDANSETRON 8 MG: 2 INJECTION INTRAMUSCULAR; INTRAVENOUS at 10:10

## 2021-10-19 RX ADMIN — GEMCITABINE HYDROCHLORIDE 2320 MG: 2 INJECTION, SOLUTION INTRAVENOUS at 10:10

## 2021-10-25 ENCOUNTER — LAB VISIT (OUTPATIENT)
Dept: LAB | Facility: HOSPITAL | Age: 61
End: 2021-10-25
Attending: STUDENT IN AN ORGANIZED HEALTH CARE EDUCATION/TRAINING PROGRAM

## 2021-10-25 DIAGNOSIS — R91.8 MULTIPLE PULMONARY NODULES: ICD-10-CM

## 2021-10-25 DIAGNOSIS — C34.02 LUNG CANCER, MAIN BRONCHUS, LEFT: ICD-10-CM

## 2021-10-25 LAB
ALBUMIN SERPL BCP-MCNC: 3.4 G/DL (ref 3.5–5.2)
ALP SERPL-CCNC: 80 U/L (ref 55–135)
ALT SERPL W/O P-5'-P-CCNC: 33 U/L (ref 10–44)
ANION GAP SERPL CALC-SCNC: 10 MMOL/L (ref 8–16)
AST SERPL-CCNC: 29 U/L (ref 10–40)
BASOPHILS # BLD AUTO: 0.09 K/UL (ref 0–0.2)
BASOPHILS NFR BLD: 1.2 % (ref 0–1.9)
BILIRUB SERPL-MCNC: 0.5 MG/DL (ref 0.1–1)
BUN SERPL-MCNC: 8 MG/DL (ref 6–20)
CALCIUM SERPL-MCNC: 9.6 MG/DL (ref 8.7–10.5)
CHLORIDE SERPL-SCNC: 103 MMOL/L (ref 95–110)
CO2 SERPL-SCNC: 23 MMOL/L (ref 23–29)
CREAT SERPL-MCNC: 0.8 MG/DL (ref 0.5–1.4)
DIFFERENTIAL METHOD: ABNORMAL
EOSINOPHIL # BLD AUTO: 0.1 K/UL (ref 0–0.5)
EOSINOPHIL NFR BLD: 1.6 % (ref 0–8)
ERYTHROCYTE [DISTWIDTH] IN BLOOD BY AUTOMATED COUNT: 18.5 % (ref 11.5–14.5)
ERYTHROCYTE [DISTWIDTH] IN BLOOD BY AUTOMATED COUNT: 18.5 % (ref 11.5–14.5)
EST. GFR  (AFRICAN AMERICAN): >60 ML/MIN/1.73 M^2
EST. GFR  (NON AFRICAN AMERICAN): >60 ML/MIN/1.73 M^2
GLUCOSE SERPL-MCNC: 117 MG/DL (ref 70–110)
HCT VFR BLD AUTO: 34.8 % (ref 40–54)
HCT VFR BLD AUTO: 34.8 % (ref 40–54)
HGB BLD-MCNC: 11.7 G/DL (ref 14–18)
HGB BLD-MCNC: 11.7 G/DL (ref 14–18)
IMM GRANULOCYTES # BLD AUTO: 0.05 K/UL (ref 0–0.04)
IMM GRANULOCYTES # BLD AUTO: 0.05 K/UL (ref 0–0.04)
IMM GRANULOCYTES NFR BLD AUTO: 0.7 % (ref 0–0.5)
LYMPHOCYTES # BLD AUTO: 1 K/UL (ref 1–4.8)
LYMPHOCYTES NFR BLD: 12.5 % (ref 18–48)
MCH RBC QN AUTO: 34.1 PG (ref 27–31)
MCH RBC QN AUTO: 34.1 PG (ref 27–31)
MCHC RBC AUTO-ENTMCNC: 33.6 G/DL (ref 32–36)
MCHC RBC AUTO-ENTMCNC: 33.6 G/DL (ref 32–36)
MCV RBC AUTO: 102 FL (ref 82–98)
MCV RBC AUTO: 102 FL (ref 82–98)
MONOCYTES # BLD AUTO: 0.7 K/UL (ref 0.3–1)
MONOCYTES NFR BLD: 9.2 % (ref 4–15)
NEUTROPHILS # BLD AUTO: 5.8 K/UL (ref 1.8–7.7)
NEUTROPHILS # BLD AUTO: 5.8 K/UL (ref 1.8–7.7)
NEUTROPHILS NFR BLD: 74.8 % (ref 38–73)
NRBC BLD-RTO: 0 /100 WBC
PLATELET # BLD AUTO: 277 K/UL (ref 150–450)
PLATELET # BLD AUTO: 277 K/UL (ref 150–450)
PMV BLD AUTO: 9.2 FL (ref 9.2–12.9)
PMV BLD AUTO: 9.2 FL (ref 9.2–12.9)
POTASSIUM SERPL-SCNC: 4.1 MMOL/L (ref 3.5–5.1)
PROT SERPL-MCNC: 7.4 G/DL (ref 6–8.4)
RBC # BLD AUTO: 3.43 M/UL (ref 4.6–6.2)
RBC # BLD AUTO: 3.43 M/UL (ref 4.6–6.2)
SODIUM SERPL-SCNC: 136 MMOL/L (ref 136–145)
WBC # BLD AUTO: 7.68 K/UL (ref 3.9–12.7)
WBC # BLD AUTO: 7.68 K/UL (ref 3.9–12.7)

## 2021-10-25 PROCEDURE — 80053 COMPREHEN METABOLIC PANEL: CPT | Performed by: INTERNAL MEDICINE

## 2021-10-25 PROCEDURE — 36415 COLL VENOUS BLD VENIPUNCTURE: CPT | Performed by: INTERNAL MEDICINE

## 2021-10-25 PROCEDURE — 85025 COMPLETE CBC W/AUTO DIFF WBC: CPT | Performed by: INTERNAL MEDICINE

## 2021-10-26 ENCOUNTER — INFUSION (OUTPATIENT)
Dept: INFUSION THERAPY | Facility: HOSPITAL | Age: 61
End: 2021-10-26
Attending: INTERNAL MEDICINE

## 2021-10-26 VITALS
OXYGEN SATURATION: 98 % | DIASTOLIC BLOOD PRESSURE: 72 MMHG | SYSTOLIC BLOOD PRESSURE: 121 MMHG | TEMPERATURE: 98 F | HEART RATE: 63 BPM

## 2021-10-26 DIAGNOSIS — R91.8 MULTIPLE PULMONARY NODULES: ICD-10-CM

## 2021-10-26 DIAGNOSIS — C34.02 LUNG CANCER, MAIN BRONCHUS, LEFT: ICD-10-CM

## 2021-10-26 DIAGNOSIS — C79.51 SECONDARY MALIGNANT NEOPLASM OF BONE: Primary | ICD-10-CM

## 2021-10-26 PROCEDURE — 96413 CHEMO IV INFUSION 1 HR: CPT

## 2021-10-26 PROCEDURE — 63600175 PHARM REV CODE 636 W HCPCS: Performed by: INTERNAL MEDICINE

## 2021-10-26 PROCEDURE — 96375 TX/PRO/DX INJ NEW DRUG ADDON: CPT

## 2021-10-26 PROCEDURE — 25000003 PHARM REV CODE 250: Performed by: INTERNAL MEDICINE

## 2021-10-26 RX ORDER — HEPARIN 100 UNIT/ML
500 SYRINGE INTRAVENOUS
Status: CANCELLED | OUTPATIENT
Start: 2021-10-26

## 2021-10-26 RX ORDER — ONDANSETRON 2 MG/ML
8 INJECTION INTRAMUSCULAR; INTRAVENOUS
Status: COMPLETED | OUTPATIENT
Start: 2021-10-26 | End: 2021-10-26

## 2021-10-26 RX ORDER — SODIUM CHLORIDE 0.9 % (FLUSH) 0.9 %
10 SYRINGE (ML) INJECTION
Status: CANCELLED | OUTPATIENT
Start: 2021-10-26

## 2021-10-26 RX ORDER — ONDANSETRON 2 MG/ML
8 INJECTION INTRAMUSCULAR; INTRAVENOUS
Status: CANCELLED | OUTPATIENT
Start: 2021-10-26

## 2021-10-26 RX ADMIN — GEMCITABINE HYDROCHLORIDE 2320 MG: 2 INJECTION, SOLUTION INTRAVENOUS at 12:10

## 2021-10-26 RX ADMIN — ONDANSETRON 8 MG: 2 INJECTION INTRAMUSCULAR; INTRAVENOUS at 11:10

## 2021-11-04 ENCOUNTER — LAB VISIT (OUTPATIENT)
Dept: LAB | Facility: HOSPITAL | Age: 61
End: 2021-11-04
Attending: INTERNAL MEDICINE

## 2021-11-04 DIAGNOSIS — C34.02 LUNG CANCER, MAIN BRONCHUS, LEFT: ICD-10-CM

## 2021-11-04 LAB
ALBUMIN SERPL BCP-MCNC: 3.3 G/DL (ref 3.5–5.2)
ALP SERPL-CCNC: 86 U/L (ref 55–135)
ALT SERPL W/O P-5'-P-CCNC: 45 U/L (ref 10–44)
ANION GAP SERPL CALC-SCNC: 10 MMOL/L (ref 8–16)
AST SERPL-CCNC: 35 U/L (ref 10–40)
BILIRUB SERPL-MCNC: 0.4 MG/DL (ref 0.1–1)
BUN SERPL-MCNC: 8 MG/DL (ref 6–20)
CALCIUM SERPL-MCNC: 9.5 MG/DL (ref 8.7–10.5)
CHLORIDE SERPL-SCNC: 105 MMOL/L (ref 95–110)
CO2 SERPL-SCNC: 27 MMOL/L (ref 23–29)
CREAT SERPL-MCNC: 0.8 MG/DL (ref 0.5–1.4)
ERYTHROCYTE [DISTWIDTH] IN BLOOD BY AUTOMATED COUNT: 18.8 % (ref 11.5–14.5)
EST. GFR  (AFRICAN AMERICAN): >60 ML/MIN/1.73 M^2
EST. GFR  (NON AFRICAN AMERICAN): >60 ML/MIN/1.73 M^2
GLUCOSE SERPL-MCNC: 120 MG/DL (ref 70–110)
HCT VFR BLD AUTO: 34 % (ref 40–54)
HGB BLD-MCNC: 11.6 G/DL (ref 14–18)
IMM GRANULOCYTES # BLD AUTO: 0.1 K/UL (ref 0–0.04)
MCH RBC QN AUTO: 35.2 PG (ref 27–31)
MCHC RBC AUTO-ENTMCNC: 34.1 G/DL (ref 32–36)
MCV RBC AUTO: 103 FL (ref 82–98)
NEUTROPHILS # BLD AUTO: 3.8 K/UL (ref 1.8–7.7)
PLATELET # BLD AUTO: 130 K/UL (ref 150–450)
PMV BLD AUTO: 9.8 FL (ref 9.2–12.9)
POTASSIUM SERPL-SCNC: 3.9 MMOL/L (ref 3.5–5.1)
PROT SERPL-MCNC: 7 G/DL (ref 6–8.4)
RBC # BLD AUTO: 3.3 M/UL (ref 4.6–6.2)
SODIUM SERPL-SCNC: 142 MMOL/L (ref 136–145)
WBC # BLD AUTO: 6.31 K/UL (ref 3.9–12.7)

## 2021-11-04 PROCEDURE — 80053 COMPREHEN METABOLIC PANEL: CPT | Performed by: INTERNAL MEDICINE

## 2021-11-04 PROCEDURE — 36415 COLL VENOUS BLD VENIPUNCTURE: CPT | Performed by: INTERNAL MEDICINE

## 2021-11-04 PROCEDURE — 85027 COMPLETE CBC AUTOMATED: CPT | Performed by: INTERNAL MEDICINE

## 2021-11-05 ENCOUNTER — OFFICE VISIT (OUTPATIENT)
Dept: HEMATOLOGY/ONCOLOGY | Facility: CLINIC | Age: 61
End: 2021-11-05

## 2021-11-05 VITALS
OXYGEN SATURATION: 97 % | SYSTOLIC BLOOD PRESSURE: 171 MMHG | HEART RATE: 87 BPM | BODY MASS INDEX: 32.82 KG/M2 | HEIGHT: 70 IN | WEIGHT: 229.25 LBS | DIASTOLIC BLOOD PRESSURE: 95 MMHG

## 2021-11-05 DIAGNOSIS — I10 BENIGN ESSENTIAL HTN: ICD-10-CM

## 2021-11-05 DIAGNOSIS — M79.604 RIGHT LEG PAIN: Primary | ICD-10-CM

## 2021-11-05 DIAGNOSIS — D69.6 THROMBOCYTOPENIA, UNSPECIFIED: ICD-10-CM

## 2021-11-05 DIAGNOSIS — C79.51 SECONDARY MALIGNANT NEOPLASM OF BONE: ICD-10-CM

## 2021-11-05 DIAGNOSIS — I70.0 ATHEROSCLEROSIS OF AORTA: ICD-10-CM

## 2021-11-05 DIAGNOSIS — J47.9 BRONCHIECTASIS WITHOUT COMPLICATION: ICD-10-CM

## 2021-11-05 DIAGNOSIS — C34.02 LUNG CANCER, MAIN BRONCHUS, LEFT: ICD-10-CM

## 2021-11-05 PROCEDURE — 99999 PR PBB SHADOW E&M-EST. PATIENT-LVL III: CPT | Mod: PBBFAC,,, | Performed by: STUDENT IN AN ORGANIZED HEALTH CARE EDUCATION/TRAINING PROGRAM

## 2021-11-05 PROCEDURE — 99999 PR PBB SHADOW E&M-EST. PATIENT-LVL III: ICD-10-PCS | Mod: PBBFAC,,, | Performed by: STUDENT IN AN ORGANIZED HEALTH CARE EDUCATION/TRAINING PROGRAM

## 2021-11-05 PROCEDURE — 99213 OFFICE O/P EST LOW 20 MIN: CPT | Mod: PBBFAC | Performed by: STUDENT IN AN ORGANIZED HEALTH CARE EDUCATION/TRAINING PROGRAM

## 2021-11-05 PROCEDURE — 99215 PR OFFICE/OUTPT VISIT, EST, LEVL V, 40-54 MIN: ICD-10-PCS | Mod: S$PBB,,, | Performed by: STUDENT IN AN ORGANIZED HEALTH CARE EDUCATION/TRAINING PROGRAM

## 2021-11-05 PROCEDURE — 99215 OFFICE O/P EST HI 40 MIN: CPT | Mod: S$PBB,,, | Performed by: STUDENT IN AN ORGANIZED HEALTH CARE EDUCATION/TRAINING PROGRAM

## 2021-11-05 RX ORDER — ONDANSETRON 2 MG/ML
8 INJECTION INTRAMUSCULAR; INTRAVENOUS
Status: CANCELLED | OUTPATIENT
Start: 2021-11-16

## 2021-11-05 RX ORDER — SODIUM CHLORIDE 0.9 % (FLUSH) 0.9 %
10 SYRINGE (ML) INJECTION
Status: CANCELLED | OUTPATIENT
Start: 2021-11-16

## 2021-11-05 RX ORDER — SODIUM CHLORIDE 0.9 % (FLUSH) 0.9 %
10 SYRINGE (ML) INJECTION
Status: CANCELLED | OUTPATIENT
Start: 2021-11-09

## 2021-11-05 RX ORDER — HEPARIN 100 UNIT/ML
500 SYRINGE INTRAVENOUS
Status: CANCELLED | OUTPATIENT
Start: 2021-11-09

## 2021-11-05 RX ORDER — ONDANSETRON 2 MG/ML
8 INJECTION INTRAMUSCULAR; INTRAVENOUS
Status: CANCELLED | OUTPATIENT
Start: 2021-11-09

## 2021-11-05 RX ORDER — HEPARIN 100 UNIT/ML
500 SYRINGE INTRAVENOUS
Status: CANCELLED | OUTPATIENT
Start: 2021-11-16

## 2021-11-09 ENCOUNTER — INFUSION (OUTPATIENT)
Dept: INFUSION THERAPY | Facility: HOSPITAL | Age: 61
End: 2021-11-09
Attending: INTERNAL MEDICINE

## 2021-11-09 VITALS
SYSTOLIC BLOOD PRESSURE: 123 MMHG | RESPIRATION RATE: 16 BRPM | HEART RATE: 84 BPM | OXYGEN SATURATION: 99 % | TEMPERATURE: 99 F | DIASTOLIC BLOOD PRESSURE: 74 MMHG

## 2021-11-09 DIAGNOSIS — C79.51 SECONDARY MALIGNANT NEOPLASM OF BONE: Primary | ICD-10-CM

## 2021-11-09 DIAGNOSIS — C34.02 LUNG CANCER, MAIN BRONCHUS, LEFT: ICD-10-CM

## 2021-11-09 DIAGNOSIS — R91.8 MULTIPLE PULMONARY NODULES: ICD-10-CM

## 2021-11-09 PROCEDURE — 96413 CHEMO IV INFUSION 1 HR: CPT

## 2021-11-09 PROCEDURE — 96374 THER/PROPH/DIAG INJ IV PUSH: CPT

## 2021-11-09 PROCEDURE — 96375 TX/PRO/DX INJ NEW DRUG ADDON: CPT

## 2021-11-09 PROCEDURE — 25000003 PHARM REV CODE 250: Performed by: STUDENT IN AN ORGANIZED HEALTH CARE EDUCATION/TRAINING PROGRAM

## 2021-11-09 PROCEDURE — 63600175 PHARM REV CODE 636 W HCPCS: Performed by: STUDENT IN AN ORGANIZED HEALTH CARE EDUCATION/TRAINING PROGRAM

## 2021-11-09 RX ORDER — ONDANSETRON 2 MG/ML
8 INJECTION INTRAMUSCULAR; INTRAVENOUS
Status: COMPLETED | OUTPATIENT
Start: 2021-11-09 | End: 2021-11-09

## 2021-11-09 RX ADMIN — ONDANSETRON 8 MG: 2 INJECTION INTRAMUSCULAR; INTRAVENOUS at 01:11

## 2021-11-09 RX ADMIN — GEMCITABINE HYDROCHLORIDE 2320 MG: 2 INJECTION, SOLUTION INTRAVENOUS at 01:11

## 2021-11-10 PROBLEM — J47.9 BRONCHIECTASIS WITHOUT COMPLICATION: Status: ACTIVE | Noted: 2021-11-10

## 2021-11-10 PROBLEM — D69.6 THROMBOCYTOPENIA, UNSPECIFIED: Status: ACTIVE | Noted: 2021-11-10

## 2021-11-10 PROBLEM — I70.0 ATHEROSCLEROSIS OF AORTA: Status: ACTIVE | Noted: 2021-11-10

## 2021-11-16 ENCOUNTER — INFUSION (OUTPATIENT)
Dept: INFUSION THERAPY | Facility: HOSPITAL | Age: 61
End: 2021-11-16
Attending: INTERNAL MEDICINE

## 2021-11-16 VITALS
OXYGEN SATURATION: 99 % | TEMPERATURE: 98 F | SYSTOLIC BLOOD PRESSURE: 143 MMHG | RESPIRATION RATE: 16 BRPM | DIASTOLIC BLOOD PRESSURE: 86 MMHG

## 2021-11-16 DIAGNOSIS — C34.02 LUNG CANCER, MAIN BRONCHUS, LEFT: ICD-10-CM

## 2021-11-16 DIAGNOSIS — R91.8 MULTIPLE PULMONARY NODULES: ICD-10-CM

## 2021-11-16 DIAGNOSIS — C79.51 SECONDARY MALIGNANT NEOPLASM OF BONE: Primary | ICD-10-CM

## 2021-11-16 PROCEDURE — 25000003 PHARM REV CODE 250: Performed by: STUDENT IN AN ORGANIZED HEALTH CARE EDUCATION/TRAINING PROGRAM

## 2021-11-16 PROCEDURE — 96375 TX/PRO/DX INJ NEW DRUG ADDON: CPT

## 2021-11-16 PROCEDURE — 96413 CHEMO IV INFUSION 1 HR: CPT

## 2021-11-16 PROCEDURE — 63600175 PHARM REV CODE 636 W HCPCS: Performed by: STUDENT IN AN ORGANIZED HEALTH CARE EDUCATION/TRAINING PROGRAM

## 2021-11-16 RX ORDER — ONDANSETRON 2 MG/ML
8 INJECTION INTRAMUSCULAR; INTRAVENOUS
Status: COMPLETED | OUTPATIENT
Start: 2021-11-16 | End: 2021-11-16

## 2021-11-16 RX ADMIN — GEMCITABINE HYDROCHLORIDE 2320 MG: 2 INJECTION, SOLUTION INTRAVENOUS at 02:11

## 2021-11-16 RX ADMIN — ONDANSETRON 8 MG: 2 INJECTION INTRAMUSCULAR; INTRAVENOUS at 02:11

## 2021-11-30 ENCOUNTER — TELEPHONE (OUTPATIENT)
Dept: HEMATOLOGY/ONCOLOGY | Facility: CLINIC | Age: 61
End: 2021-11-30

## 2021-12-17 ENCOUNTER — LAB VISIT (OUTPATIENT)
Dept: LAB | Facility: HOSPITAL | Age: 61
End: 2021-12-17
Attending: INTERNAL MEDICINE

## 2021-12-17 ENCOUNTER — OFFICE VISIT (OUTPATIENT)
Dept: HEMATOLOGY/ONCOLOGY | Facility: CLINIC | Age: 61
End: 2021-12-17

## 2021-12-17 VITALS
OXYGEN SATURATION: 99 % | TEMPERATURE: 98 F | BODY MASS INDEX: 29.54 KG/M2 | SYSTOLIC BLOOD PRESSURE: 186 MMHG | WEIGHT: 206.38 LBS | HEIGHT: 70 IN | HEART RATE: 77 BPM | DIASTOLIC BLOOD PRESSURE: 90 MMHG

## 2021-12-17 DIAGNOSIS — C34.02 LUNG CANCER, MAIN BRONCHUS, LEFT: ICD-10-CM

## 2021-12-17 DIAGNOSIS — C34.02 LUNG CANCER, MAIN BRONCHUS, LEFT: Primary | ICD-10-CM

## 2021-12-17 LAB
ALBUMIN SERPL BCP-MCNC: 3.9 G/DL (ref 3.5–5.2)
ALP SERPL-CCNC: 86 U/L (ref 55–135)
ALT SERPL W/O P-5'-P-CCNC: 20 U/L (ref 10–44)
ANION GAP SERPL CALC-SCNC: 10 MMOL/L (ref 8–16)
AST SERPL-CCNC: 27 U/L (ref 10–40)
BILIRUB SERPL-MCNC: 0.4 MG/DL (ref 0.1–1)
BUN SERPL-MCNC: 11 MG/DL (ref 8–23)
CALCIUM SERPL-MCNC: 9.7 MG/DL (ref 8.7–10.5)
CHLORIDE SERPL-SCNC: 102 MMOL/L (ref 95–110)
CO2 SERPL-SCNC: 27 MMOL/L (ref 23–29)
CREAT SERPL-MCNC: 0.9 MG/DL (ref 0.5–1.4)
ERYTHROCYTE [DISTWIDTH] IN BLOOD BY AUTOMATED COUNT: 14.3 % (ref 11.5–14.5)
EST. GFR  (AFRICAN AMERICAN): >60 ML/MIN/1.73 M^2
EST. GFR  (NON AFRICAN AMERICAN): >60 ML/MIN/1.73 M^2
GLUCOSE SERPL-MCNC: 110 MG/DL (ref 70–110)
HCT VFR BLD AUTO: 46.2 % (ref 40–54)
HGB BLD-MCNC: 14.8 G/DL (ref 14–18)
IMM GRANULOCYTES # BLD AUTO: 0.03 K/UL (ref 0–0.04)
MCH RBC QN AUTO: 32.7 PG (ref 27–31)
MCHC RBC AUTO-ENTMCNC: 32 G/DL (ref 32–36)
MCV RBC AUTO: 102 FL (ref 82–98)
NEUTROPHILS # BLD AUTO: 4.8 K/UL (ref 1.8–7.7)
PLATELET # BLD AUTO: 222 K/UL (ref 150–450)
PMV BLD AUTO: 10.1 FL (ref 9.2–12.9)
POTASSIUM SERPL-SCNC: 4.7 MMOL/L (ref 3.5–5.1)
PROT SERPL-MCNC: 8.1 G/DL (ref 6–8.4)
RBC # BLD AUTO: 4.52 M/UL (ref 4.6–6.2)
SODIUM SERPL-SCNC: 139 MMOL/L (ref 136–145)
WBC # BLD AUTO: 8.16 K/UL (ref 3.9–12.7)

## 2021-12-17 PROCEDURE — 85027 COMPLETE CBC AUTOMATED: CPT | Performed by: INTERNAL MEDICINE

## 2021-12-17 PROCEDURE — 80053 COMPREHEN METABOLIC PANEL: CPT | Performed by: INTERNAL MEDICINE

## 2021-12-17 PROCEDURE — 99999 PR PBB SHADOW E&M-EST. PATIENT-LVL IV: CPT | Mod: PBBFAC,,, | Performed by: STUDENT IN AN ORGANIZED HEALTH CARE EDUCATION/TRAINING PROGRAM

## 2021-12-17 PROCEDURE — 99215 OFFICE O/P EST HI 40 MIN: CPT | Mod: S$PBB,,, | Performed by: STUDENT IN AN ORGANIZED HEALTH CARE EDUCATION/TRAINING PROGRAM

## 2021-12-17 PROCEDURE — 99999 PR PBB SHADOW E&M-EST. PATIENT-LVL IV: ICD-10-PCS | Mod: PBBFAC,,, | Performed by: STUDENT IN AN ORGANIZED HEALTH CARE EDUCATION/TRAINING PROGRAM

## 2021-12-17 PROCEDURE — 99214 OFFICE O/P EST MOD 30 MIN: CPT | Mod: PBBFAC | Performed by: STUDENT IN AN ORGANIZED HEALTH CARE EDUCATION/TRAINING PROGRAM

## 2021-12-17 PROCEDURE — 99215 PR OFFICE/OUTPT VISIT, EST, LEVL V, 40-54 MIN: ICD-10-PCS | Mod: S$PBB,,, | Performed by: STUDENT IN AN ORGANIZED HEALTH CARE EDUCATION/TRAINING PROGRAM

## 2021-12-17 PROCEDURE — 36415 COLL VENOUS BLD VENIPUNCTURE: CPT | Performed by: INTERNAL MEDICINE

## 2021-12-20 ENCOUNTER — TELEPHONE (OUTPATIENT)
Dept: INTERVENTIONAL RADIOLOGY/VASCULAR | Facility: CLINIC | Age: 61
End: 2021-12-20

## 2021-12-21 ENCOUNTER — INFUSION (OUTPATIENT)
Dept: INFUSION THERAPY | Facility: HOSPITAL | Age: 61
End: 2021-12-21
Attending: INTERNAL MEDICINE

## 2021-12-21 VITALS
HEIGHT: 70 IN | HEART RATE: 80 BPM | BODY MASS INDEX: 31.5 KG/M2 | SYSTOLIC BLOOD PRESSURE: 142 MMHG | DIASTOLIC BLOOD PRESSURE: 89 MMHG | RESPIRATION RATE: 16 BRPM | WEIGHT: 220 LBS | OXYGEN SATURATION: 100 % | TEMPERATURE: 98 F

## 2021-12-21 DIAGNOSIS — C79.51 SECONDARY MALIGNANT NEOPLASM OF BONE: Primary | ICD-10-CM

## 2021-12-21 DIAGNOSIS — R91.8 MULTIPLE PULMONARY NODULES: ICD-10-CM

## 2021-12-21 DIAGNOSIS — C34.02 LUNG CANCER, MAIN BRONCHUS, LEFT: ICD-10-CM

## 2021-12-21 PROCEDURE — 96375 TX/PRO/DX INJ NEW DRUG ADDON: CPT

## 2021-12-21 PROCEDURE — 25000003 PHARM REV CODE 250: Performed by: STUDENT IN AN ORGANIZED HEALTH CARE EDUCATION/TRAINING PROGRAM

## 2021-12-21 PROCEDURE — 96413 CHEMO IV INFUSION 1 HR: CPT

## 2021-12-21 PROCEDURE — 63600175 PHARM REV CODE 636 W HCPCS: Performed by: STUDENT IN AN ORGANIZED HEALTH CARE EDUCATION/TRAINING PROGRAM

## 2021-12-21 RX ORDER — ONDANSETRON 2 MG/ML
8 INJECTION INTRAMUSCULAR; INTRAVENOUS
Status: COMPLETED | OUTPATIENT
Start: 2021-12-21 | End: 2021-12-21

## 2021-12-21 RX ADMIN — ONDANSETRON 8 MG: 2 INJECTION INTRAMUSCULAR; INTRAVENOUS at 01:12

## 2021-12-21 RX ADMIN — GEMCITABINE HYDROCHLORIDE 2320 MG: 2 INJECTION, SOLUTION INTRAVENOUS at 01:12

## 2021-12-23 ENCOUNTER — HOSPITAL ENCOUNTER (OUTPATIENT)
Dept: PREADMISSION TESTING | Facility: HOSPITAL | Age: 61
Discharge: HOME | End: 2021-12-23
Attending: STUDENT IN AN ORGANIZED HEALTH CARE EDUCATION/TRAINING PROGRAM

## 2021-12-23 VITALS
SYSTOLIC BLOOD PRESSURE: 139 MMHG | HEART RATE: 115 BPM | OXYGEN SATURATION: 100 % | TEMPERATURE: 98 F | HEIGHT: 70 IN | WEIGHT: 219.13 LBS | RESPIRATION RATE: 16 BRPM | DIASTOLIC BLOOD PRESSURE: 87 MMHG | BODY MASS INDEX: 31.37 KG/M2

## 2021-12-23 DIAGNOSIS — Z01.818 PREOPERATIVE TESTING: Primary | ICD-10-CM

## 2021-12-23 DIAGNOSIS — C34.02 LUNG CANCER, MAIN BRONCHUS, LEFT: ICD-10-CM

## 2021-12-23 LAB
ALBUMIN SERPL BCP-MCNC: 3.6 G/DL (ref 3.5–5.2)
ALP SERPL-CCNC: 81 U/L (ref 55–135)
ALT SERPL W/O P-5'-P-CCNC: 26 U/L (ref 10–44)
ANION GAP SERPL CALC-SCNC: 8 MMOL/L (ref 8–16)
AST SERPL-CCNC: 34 U/L (ref 10–40)
BASOPHILS # BLD AUTO: 0.06 K/UL (ref 0–0.2)
BASOPHILS NFR BLD: 0.6 % (ref 0–1.9)
BILIRUB SERPL-MCNC: 0.7 MG/DL (ref 0.1–1)
BUN SERPL-MCNC: 13 MG/DL (ref 8–23)
CALCIUM SERPL-MCNC: 9.7 MG/DL (ref 8.7–10.5)
CHLORIDE SERPL-SCNC: 104 MMOL/L (ref 95–110)
CO2 SERPL-SCNC: 28 MMOL/L (ref 23–29)
CREAT SERPL-MCNC: 0.8 MG/DL (ref 0.5–1.4)
DIFFERENTIAL METHOD: ABNORMAL
EOSINOPHIL # BLD AUTO: 0.1 K/UL (ref 0–0.5)
EOSINOPHIL NFR BLD: 1.5 % (ref 0–8)
ERYTHROCYTE [DISTWIDTH] IN BLOOD BY AUTOMATED COUNT: 14.2 % (ref 11.5–14.5)
EST. GFR  (AFRICAN AMERICAN): >60 ML/MIN/1.73 M^2
EST. GFR  (NON AFRICAN AMERICAN): >60 ML/MIN/1.73 M^2
GLUCOSE SERPL-MCNC: 120 MG/DL (ref 70–110)
HCT VFR BLD AUTO: 44.3 % (ref 40–54)
HGB BLD-MCNC: 14.4 G/DL (ref 14–18)
IMM GRANULOCYTES # BLD AUTO: 0.02 K/UL (ref 0–0.04)
IMM GRANULOCYTES NFR BLD AUTO: 0.2 % (ref 0–0.5)
INR PPP: 1 (ref 0.8–1.2)
LYMPHOCYTES # BLD AUTO: 0.9 K/UL (ref 1–4.8)
LYMPHOCYTES NFR BLD: 9.9 % (ref 18–48)
MCH RBC QN AUTO: 32.9 PG (ref 27–31)
MCHC RBC AUTO-ENTMCNC: 32.5 G/DL (ref 32–36)
MCV RBC AUTO: 101 FL (ref 82–98)
MONOCYTES # BLD AUTO: 0.8 K/UL (ref 0.3–1)
MONOCYTES NFR BLD: 8.1 % (ref 4–15)
NEUTROPHILS # BLD AUTO: 7.4 K/UL (ref 1.8–7.7)
NEUTROPHILS NFR BLD: 79.7 % (ref 38–73)
NRBC BLD-RTO: 0 /100 WBC
PLATELET # BLD AUTO: 145 K/UL (ref 150–450)
PMV BLD AUTO: 10.3 FL (ref 9.2–12.9)
POTASSIUM SERPL-SCNC: 4.8 MMOL/L (ref 3.5–5.1)
PROT SERPL-MCNC: 7.6 G/DL (ref 6–8.4)
PROTHROMBIN TIME: 11.1 SEC (ref 9–12.5)
RBC # BLD AUTO: 4.38 M/UL (ref 4.6–6.2)
SODIUM SERPL-SCNC: 140 MMOL/L (ref 136–145)
WBC # BLD AUTO: 9.31 K/UL (ref 3.9–12.7)

## 2021-12-23 PROCEDURE — 36415 COLL VENOUS BLD VENIPUNCTURE: CPT | Performed by: STUDENT IN AN ORGANIZED HEALTH CARE EDUCATION/TRAINING PROGRAM

## 2021-12-23 PROCEDURE — 85025 COMPLETE CBC W/AUTO DIFF WBC: CPT | Performed by: STUDENT IN AN ORGANIZED HEALTH CARE EDUCATION/TRAINING PROGRAM

## 2021-12-23 PROCEDURE — 85610 PROTHROMBIN TIME: CPT | Performed by: STUDENT IN AN ORGANIZED HEALTH CARE EDUCATION/TRAINING PROGRAM

## 2021-12-23 PROCEDURE — 80053 COMPREHEN METABOLIC PANEL: CPT | Performed by: STUDENT IN AN ORGANIZED HEALTH CARE EDUCATION/TRAINING PROGRAM

## 2021-12-27 ENCOUNTER — HOSPITAL ENCOUNTER (OUTPATIENT)
Dept: INTERVENTIONAL RADIOLOGY/VASCULAR | Facility: HOSPITAL | Age: 61
Discharge: HOME | End: 2021-12-27
Attending: STUDENT IN AN ORGANIZED HEALTH CARE EDUCATION/TRAINING PROGRAM | Admitting: RADIOLOGY

## 2021-12-27 VITALS
OXYGEN SATURATION: 99 % | SYSTOLIC BLOOD PRESSURE: 128 MMHG | HEART RATE: 66 BPM | DIASTOLIC BLOOD PRESSURE: 76 MMHG | RESPIRATION RATE: 20 BRPM | TEMPERATURE: 98 F

## 2021-12-27 DIAGNOSIS — C34.02 LUNG CANCER, MAIN BRONCHUS, LEFT: ICD-10-CM

## 2021-12-27 DIAGNOSIS — C34.90 LUNG CANCER: ICD-10-CM

## 2021-12-27 PROCEDURE — C1788 PORT, INDWELLING, IMP: HCPCS

## 2021-12-27 PROCEDURE — 36561 INSERT TUNNELED CV CATH: CPT | Mod: RT,,, | Performed by: RADIOLOGY

## 2021-12-27 PROCEDURE — 99152 MOD SED SAME PHYS/QHP 5/>YRS: CPT | Performed by: RADIOLOGY

## 2021-12-27 PROCEDURE — 77001 CHG FLUOROGUIDE CNTRL VEN ACCESS,PLACE,REPLACE,REMOVE: ICD-10-PCS | Mod: 26,,, | Performed by: RADIOLOGY

## 2021-12-27 PROCEDURE — 77001 FLUOROGUIDE FOR VEIN DEVICE: CPT | Mod: 26,,, | Performed by: RADIOLOGY

## 2021-12-27 PROCEDURE — 76937 IR ULTRASOUND GUIDANCE: ICD-10-PCS | Mod: 26,,, | Performed by: RADIOLOGY

## 2021-12-27 PROCEDURE — 99153 MOD SED SAME PHYS/QHP EA: CPT

## 2021-12-27 PROCEDURE — 99152 MOD SED SAME PHYS/QHP 5/>YRS: CPT | Mod: ,,, | Performed by: RADIOLOGY

## 2021-12-27 PROCEDURE — 25000003 PHARM REV CODE 250: Performed by: RADIOLOGY

## 2021-12-27 PROCEDURE — 36561 IR TUNNELED CATH PLACEMENT WITH PORT: ICD-10-PCS | Mod: RT,,, | Performed by: RADIOLOGY

## 2021-12-27 PROCEDURE — 76937 US GUIDE VASCULAR ACCESS: CPT | Mod: TC | Performed by: RADIOLOGY

## 2021-12-27 PROCEDURE — 99153 MOD SED SAME PHYS/QHP EA: CPT | Performed by: RADIOLOGY

## 2021-12-27 PROCEDURE — 99152 PR MOD CONSCIOUS SEDATION, SAME PHYS, 5+ YRS, FIRST 15 MIN: ICD-10-PCS | Mod: ,,, | Performed by: RADIOLOGY

## 2021-12-27 PROCEDURE — 36561 INSERT TUNNELED CV CATH: CPT | Performed by: RADIOLOGY

## 2021-12-27 PROCEDURE — 99152 MOD SED SAME PHYS/QHP 5/>YRS: CPT

## 2021-12-27 PROCEDURE — 77001 FLUOROGUIDE FOR VEIN DEVICE: CPT | Mod: TC | Performed by: RADIOLOGY

## 2021-12-27 PROCEDURE — 63600175 PHARM REV CODE 636 W HCPCS: Performed by: RADIOLOGY

## 2021-12-27 RX ORDER — CEFAZOLIN SODIUM 1 G/50ML
SOLUTION INTRAVENOUS
Status: COMPLETED | OUTPATIENT
Start: 2021-12-27 | End: 2021-12-27

## 2021-12-27 RX ORDER — FENTANYL CITRATE 50 UG/ML
INJECTION, SOLUTION INTRAMUSCULAR; INTRAVENOUS CODE/TRAUMA/SEDATION MEDICATION
Status: COMPLETED | OUTPATIENT
Start: 2021-12-27 | End: 2021-12-27

## 2021-12-27 RX ORDER — HYDROCODONE BITARTRATE AND ACETAMINOPHEN 5; 325 MG/1; MG/1
1 TABLET ORAL EVERY 4 HOURS PRN
Status: DISCONTINUED | OUTPATIENT
Start: 2021-12-27 | End: 2021-12-28 | Stop reason: HOSPADM

## 2021-12-27 RX ORDER — HEPARIN 100 UNIT/ML
SYRINGE INTRAVENOUS CODE/TRAUMA/SEDATION MEDICATION
Status: COMPLETED | OUTPATIENT
Start: 2021-12-27 | End: 2021-12-27

## 2021-12-27 RX ORDER — LIDOCAINE HYDROCHLORIDE 20 MG/ML
INJECTION, SOLUTION INFILTRATION; PERINEURAL CODE/TRAUMA/SEDATION MEDICATION
Status: COMPLETED | OUTPATIENT
Start: 2021-12-27 | End: 2021-12-27

## 2021-12-27 RX ORDER — MIDAZOLAM HYDROCHLORIDE 1 MG/ML
INJECTION INTRAMUSCULAR; INTRAVENOUS CODE/TRAUMA/SEDATION MEDICATION
Status: COMPLETED | OUTPATIENT
Start: 2021-12-27 | End: 2021-12-27

## 2021-12-27 RX ADMIN — MIDAZOLAM HYDROCHLORIDE 1.5 MG: 1 INJECTION, SOLUTION INTRAMUSCULAR; INTRAVENOUS at 09:12

## 2021-12-27 RX ADMIN — LIDOCAINE HYDROCHLORIDE 5 ML: 20 INJECTION, SOLUTION INFILTRATION; PERINEURAL at 09:12

## 2021-12-27 RX ADMIN — FENTANYL CITRATE 50 MCG: 50 INJECTION, SOLUTION INTRAMUSCULAR; INTRAVENOUS at 09:12

## 2021-12-27 RX ADMIN — LIDOCAINE HYDROCHLORIDE 7 ML: 20 INJECTION, SOLUTION INFILTRATION; PERINEURAL at 09:12

## 2021-12-27 RX ADMIN — Medication 5 ML: at 10:12

## 2021-12-27 RX ADMIN — CEFAZOLIN SODIUM 2 G: 1 SOLUTION INTRAVENOUS at 09:12

## 2021-12-27 RX ADMIN — LIDOCAINE HYDROCHLORIDE 6 ML: 20 INJECTION, SOLUTION INFILTRATION; PERINEURAL at 09:12

## 2021-12-28 ENCOUNTER — INFUSION (OUTPATIENT)
Dept: INFUSION THERAPY | Facility: HOSPITAL | Age: 61
End: 2021-12-28
Attending: INTERNAL MEDICINE

## 2021-12-28 VITALS
RESPIRATION RATE: 16 BRPM | DIASTOLIC BLOOD PRESSURE: 73 MMHG | TEMPERATURE: 99 F | OXYGEN SATURATION: 98 % | SYSTOLIC BLOOD PRESSURE: 134 MMHG | HEART RATE: 74 BPM

## 2021-12-28 DIAGNOSIS — C79.51 SECONDARY MALIGNANT NEOPLASM OF BONE: Primary | ICD-10-CM

## 2021-12-28 DIAGNOSIS — C34.02 LUNG CANCER, MAIN BRONCHUS, LEFT: ICD-10-CM

## 2021-12-28 DIAGNOSIS — R91.8 MULTIPLE PULMONARY NODULES: ICD-10-CM

## 2021-12-28 PROCEDURE — 63600175 PHARM REV CODE 636 W HCPCS: Performed by: STUDENT IN AN ORGANIZED HEALTH CARE EDUCATION/TRAINING PROGRAM

## 2021-12-28 PROCEDURE — A4216 STERILE WATER/SALINE, 10 ML: HCPCS | Performed by: STUDENT IN AN ORGANIZED HEALTH CARE EDUCATION/TRAINING PROGRAM

## 2021-12-28 PROCEDURE — 96374 THER/PROPH/DIAG INJ IV PUSH: CPT

## 2021-12-28 PROCEDURE — 96413 CHEMO IV INFUSION 1 HR: CPT

## 2021-12-28 PROCEDURE — 25000003 PHARM REV CODE 250: Performed by: STUDENT IN AN ORGANIZED HEALTH CARE EDUCATION/TRAINING PROGRAM

## 2021-12-28 RX ORDER — SODIUM CHLORIDE 0.9 % (FLUSH) 0.9 %
10 SYRINGE (ML) INJECTION
Status: DISCONTINUED | OUTPATIENT
Start: 2021-12-28 | End: 2021-12-28 | Stop reason: HOSPADM

## 2021-12-28 RX ORDER — HEPARIN 100 UNIT/ML
500 SYRINGE INTRAVENOUS
Status: DISCONTINUED | OUTPATIENT
Start: 2021-12-28 | End: 2021-12-28 | Stop reason: HOSPADM

## 2021-12-28 RX ORDER — ONDANSETRON 2 MG/ML
8 INJECTION INTRAMUSCULAR; INTRAVENOUS
Status: COMPLETED | OUTPATIENT
Start: 2021-12-28 | End: 2021-12-28

## 2021-12-28 RX ADMIN — ONDANSETRON 8 MG: 2 INJECTION INTRAMUSCULAR; INTRAVENOUS at 01:12

## 2021-12-28 RX ADMIN — HEPARIN 500 UNITS: 100 SYRINGE at 02:12

## 2021-12-28 RX ADMIN — GEMCITABINE HYDROCHLORIDE 2320 MG: 2 INJECTION, SOLUTION INTRAVENOUS at 01:12

## 2021-12-28 RX ADMIN — Medication 10 ML: at 02:12

## 2022-01-01 ENCOUNTER — INFUSION (OUTPATIENT)
Dept: INFUSION THERAPY | Facility: HOSPITAL | Age: 62
DRG: 189 | End: 2022-01-01
Attending: STUDENT IN AN ORGANIZED HEALTH CARE EDUCATION/TRAINING PROGRAM
Payer: MEDICARE

## 2022-01-01 ENCOUNTER — OFFICE VISIT (OUTPATIENT)
Dept: FAMILY MEDICINE | Facility: CLINIC | Age: 62
End: 2022-01-01
Payer: MEDICARE

## 2022-01-01 ENCOUNTER — OFFICE VISIT (OUTPATIENT)
Dept: HEMATOLOGY/ONCOLOGY | Facility: CLINIC | Age: 62
End: 2022-01-01
Payer: MEDICARE

## 2022-01-01 ENCOUNTER — HOSPITAL ENCOUNTER (INPATIENT)
Facility: HOSPITAL | Age: 62
LOS: 7 days | Discharge: HOME HEALTH | DRG: 189 | End: 2022-12-23
Attending: STUDENT IN AN ORGANIZED HEALTH CARE EDUCATION/TRAINING PROGRAM | Admitting: STUDENT IN AN ORGANIZED HEALTH CARE EDUCATION/TRAINING PROGRAM
Payer: MEDICARE

## 2022-01-01 ENCOUNTER — PATIENT MESSAGE (OUTPATIENT)
Dept: HEMATOLOGY/ONCOLOGY | Facility: CLINIC | Age: 62
End: 2022-01-01
Payer: MEDICARE

## 2022-01-01 ENCOUNTER — INFUSION (OUTPATIENT)
Dept: INFUSION THERAPY | Facility: HOSPITAL | Age: 62
End: 2022-01-01
Attending: STUDENT IN AN ORGANIZED HEALTH CARE EDUCATION/TRAINING PROGRAM
Payer: MEDICARE

## 2022-01-01 ENCOUNTER — EXTERNAL HOME HEALTH (OUTPATIENT)
Dept: HOME HEALTH SERVICES | Facility: HOSPITAL | Age: 62
End: 2022-01-01
Payer: MEDICARE

## 2022-01-01 ENCOUNTER — OFFICE VISIT (OUTPATIENT)
Dept: CARDIOLOGY | Facility: CLINIC | Age: 62
End: 2022-01-01
Payer: MEDICARE

## 2022-01-01 ENCOUNTER — TELEPHONE (OUTPATIENT)
Dept: PHARMACY | Facility: CLINIC | Age: 62
End: 2022-01-01
Payer: MEDICARE

## 2022-01-01 ENCOUNTER — HOSPITAL ENCOUNTER (INPATIENT)
Facility: HOSPITAL | Age: 62
LOS: 7 days | Discharge: HOME HEALTH | DRG: 189 | End: 2022-11-10
Attending: EMERGENCY MEDICINE | Admitting: EMERGENCY MEDICINE
Payer: MEDICARE

## 2022-01-01 ENCOUNTER — TELEPHONE (OUTPATIENT)
Dept: HEMATOLOGY/ONCOLOGY | Facility: CLINIC | Age: 62
End: 2022-01-01
Payer: MEDICARE

## 2022-01-01 ENCOUNTER — PES CALL (OUTPATIENT)
Dept: ADMINISTRATIVE | Facility: CLINIC | Age: 62
End: 2022-01-01
Payer: MEDICARE

## 2022-01-01 ENCOUNTER — TELEPHONE (OUTPATIENT)
Dept: PULMONOLOGY | Facility: CLINIC | Age: 62
End: 2022-01-01
Payer: MEDICARE

## 2022-01-01 ENCOUNTER — TELEPHONE (OUTPATIENT)
Dept: HOME HEALTH SERVICES | Facility: CLINIC | Age: 62
End: 2022-01-01
Payer: MEDICARE

## 2022-01-01 ENCOUNTER — PATIENT OUTREACH (OUTPATIENT)
Dept: ADMINISTRATIVE | Facility: CLINIC | Age: 62
End: 2022-01-01
Payer: MEDICARE

## 2022-01-01 ENCOUNTER — LAB VISIT (OUTPATIENT)
Dept: LAB | Facility: HOSPITAL | Age: 62
End: 2022-01-01
Attending: STUDENT IN AN ORGANIZED HEALTH CARE EDUCATION/TRAINING PROGRAM
Payer: MEDICARE

## 2022-01-01 ENCOUNTER — TELEPHONE (OUTPATIENT)
Dept: HEMATOLOGY/ONCOLOGY | Facility: CLINIC | Age: 62
End: 2022-01-01

## 2022-01-01 ENCOUNTER — HOSPITAL ENCOUNTER (INPATIENT)
Facility: HOSPITAL | Age: 62
LOS: 9 days | Discharge: HOME HEALTH | DRG: 189 | End: 2022-10-07
Attending: EMERGENCY MEDICINE | Admitting: HOSPITALIST
Payer: MEDICARE

## 2022-01-01 ENCOUNTER — HOSPITAL ENCOUNTER (OUTPATIENT)
Dept: RADIOLOGY | Facility: HOSPITAL | Age: 62
Discharge: HOME | End: 2022-11-18
Attending: STUDENT IN AN ORGANIZED HEALTH CARE EDUCATION/TRAINING PROGRAM
Payer: MEDICARE

## 2022-01-01 ENCOUNTER — HOSPITAL ENCOUNTER (OUTPATIENT)
Dept: RADIOLOGY | Facility: HOSPITAL | Age: 62
Discharge: HOME | End: 2022-10-26
Attending: STUDENT IN AN ORGANIZED HEALTH CARE EDUCATION/TRAINING PROGRAM
Payer: MEDICARE

## 2022-01-01 ENCOUNTER — HOSPITAL ENCOUNTER (OUTPATIENT)
Facility: HOSPITAL | Age: 62
Discharge: HOME | End: 2022-09-06
Attending: EMERGENCY MEDICINE | Admitting: INTERNAL MEDICINE
Payer: MEDICARE

## 2022-01-01 VITALS
WEIGHT: 190 LBS | OXYGEN SATURATION: 97 % | TEMPERATURE: 97 F | DIASTOLIC BLOOD PRESSURE: 74 MMHG | SYSTOLIC BLOOD PRESSURE: 137 MMHG | HEART RATE: 90 BPM | RESPIRATION RATE: 20 BRPM | HEIGHT: 70 IN | BODY MASS INDEX: 27.2 KG/M2

## 2022-01-01 VITALS
HEART RATE: 87 BPM | DIASTOLIC BLOOD PRESSURE: 72 MMHG | TEMPERATURE: 98 F | OXYGEN SATURATION: 97 % | RESPIRATION RATE: 17 BRPM | SYSTOLIC BLOOD PRESSURE: 127 MMHG

## 2022-01-01 VITALS
WEIGHT: 190.5 LBS | HEART RATE: 73 BPM | TEMPERATURE: 98 F | HEIGHT: 70 IN | BODY MASS INDEX: 27.27 KG/M2 | DIASTOLIC BLOOD PRESSURE: 80 MMHG | OXYGEN SATURATION: 97 % | SYSTOLIC BLOOD PRESSURE: 132 MMHG | RESPIRATION RATE: 16 BRPM

## 2022-01-01 VITALS
WEIGHT: 192.88 LBS | DIASTOLIC BLOOD PRESSURE: 70 MMHG | HEART RATE: 79 BPM | OXYGEN SATURATION: 92 % | SYSTOLIC BLOOD PRESSURE: 125 MMHG | RESPIRATION RATE: 18 BRPM | BODY MASS INDEX: 27.61 KG/M2 | TEMPERATURE: 97 F | HEIGHT: 70 IN

## 2022-01-01 VITALS
SYSTOLIC BLOOD PRESSURE: 122 MMHG | DIASTOLIC BLOOD PRESSURE: 74 MMHG | HEART RATE: 79 BPM | RESPIRATION RATE: 16 BRPM | BODY MASS INDEX: 25.98 KG/M2 | TEMPERATURE: 98 F | OXYGEN SATURATION: 95 % | HEIGHT: 70 IN | WEIGHT: 181.44 LBS

## 2022-01-01 VITALS
OXYGEN SATURATION: 94 % | BODY MASS INDEX: 28.15 KG/M2 | HEIGHT: 70 IN | TEMPERATURE: 98 F | HEART RATE: 83 BPM | WEIGHT: 196.63 LBS | SYSTOLIC BLOOD PRESSURE: 180 MMHG | DIASTOLIC BLOOD PRESSURE: 60 MMHG

## 2022-01-01 VITALS
OXYGEN SATURATION: 98 % | TEMPERATURE: 98 F | SYSTOLIC BLOOD PRESSURE: 147 MMHG | DIASTOLIC BLOOD PRESSURE: 92 MMHG | HEART RATE: 105 BPM | BODY MASS INDEX: 27.88 KG/M2 | RESPIRATION RATE: 17 BRPM | HEIGHT: 70 IN | WEIGHT: 194.75 LBS

## 2022-01-01 VITALS
HEART RATE: 87 BPM | TEMPERATURE: 99 F | SYSTOLIC BLOOD PRESSURE: 138 MMHG | RESPIRATION RATE: 18 BRPM | OXYGEN SATURATION: 97 % | HEIGHT: 70 IN | WEIGHT: 200.19 LBS | DIASTOLIC BLOOD PRESSURE: 84 MMHG | BODY MASS INDEX: 28.66 KG/M2

## 2022-01-01 VITALS
OXYGEN SATURATION: 97 % | DIASTOLIC BLOOD PRESSURE: 79 MMHG | SYSTOLIC BLOOD PRESSURE: 140 MMHG | DIASTOLIC BLOOD PRESSURE: 82 MMHG | TEMPERATURE: 98 F | RESPIRATION RATE: 18 BRPM | SYSTOLIC BLOOD PRESSURE: 138 MMHG | HEIGHT: 70 IN | OXYGEN SATURATION: 94 % | WEIGHT: 194.25 LBS | BODY MASS INDEX: 27.81 KG/M2 | HEART RATE: 83 BPM | HEART RATE: 95 BPM

## 2022-01-01 VITALS
BODY MASS INDEX: 27.77 KG/M2 | RESPIRATION RATE: 18 BRPM | OXYGEN SATURATION: 99 % | HEIGHT: 70 IN | HEART RATE: 87 BPM | WEIGHT: 194 LBS | DIASTOLIC BLOOD PRESSURE: 86 MMHG | SYSTOLIC BLOOD PRESSURE: 187 MMHG

## 2022-01-01 VITALS
HEART RATE: 73 BPM | WEIGHT: 193.13 LBS | HEIGHT: 70 IN | OXYGEN SATURATION: 94 % | TEMPERATURE: 98 F | DIASTOLIC BLOOD PRESSURE: 90 MMHG | SYSTOLIC BLOOD PRESSURE: 179 MMHG | BODY MASS INDEX: 27.65 KG/M2

## 2022-01-01 VITALS
SYSTOLIC BLOOD PRESSURE: 175 MMHG | HEART RATE: 89 BPM | OXYGEN SATURATION: 91 % | BODY MASS INDEX: 28.62 KG/M2 | DIASTOLIC BLOOD PRESSURE: 90 MMHG | WEIGHT: 199.94 LBS | HEIGHT: 70 IN

## 2022-01-01 VITALS
OXYGEN SATURATION: 94 % | RESPIRATION RATE: 17 BRPM | BODY MASS INDEX: 26.75 KG/M2 | WEIGHT: 186.81 LBS | HEART RATE: 89 BPM | TEMPERATURE: 98 F | SYSTOLIC BLOOD PRESSURE: 152 MMHG | DIASTOLIC BLOOD PRESSURE: 73 MMHG | HEIGHT: 70 IN

## 2022-01-01 VITALS
HEART RATE: 72 BPM | DIASTOLIC BLOOD PRESSURE: 74 MMHG | RESPIRATION RATE: 17 BRPM | TEMPERATURE: 98 F | SYSTOLIC BLOOD PRESSURE: 118 MMHG | OXYGEN SATURATION: 92 %

## 2022-01-01 DIAGNOSIS — R91.8 MULTIPLE PULMONARY NODULES: ICD-10-CM

## 2022-01-01 DIAGNOSIS — I10 PRIMARY HYPERTENSION: ICD-10-CM

## 2022-01-01 DIAGNOSIS — R07.9 CHEST PAIN: ICD-10-CM

## 2022-01-01 DIAGNOSIS — I10 ESSENTIAL HYPERTENSION: Chronic | ICD-10-CM

## 2022-01-01 DIAGNOSIS — J96.11 CHRONIC RESPIRATORY FAILURE WITH HYPOXIA: ICD-10-CM

## 2022-01-01 DIAGNOSIS — R79.89 ELEVATED TROPONIN: ICD-10-CM

## 2022-01-01 DIAGNOSIS — D84.821 IMMUNODEFICIENCY DUE TO DRUGS: ICD-10-CM

## 2022-01-01 DIAGNOSIS — Z12.11 ENCOUNTER FOR SCREENING COLONOSCOPY FOR NON-HIGH-RISK PATIENT: Primary | ICD-10-CM

## 2022-01-01 DIAGNOSIS — Z71.89 ACP (ADVANCE CARE PLANNING): ICD-10-CM

## 2022-01-01 DIAGNOSIS — G89.29 CHRONIC LEFT SHOULDER PAIN: ICD-10-CM

## 2022-01-01 DIAGNOSIS — I47.19 MULTIFOCAL ATRIAL TACHYCARDIA: ICD-10-CM

## 2022-01-01 DIAGNOSIS — I73.9 PAD (PERIPHERAL ARTERY DISEASE): ICD-10-CM

## 2022-01-01 DIAGNOSIS — R09.02 HYPOXIA: Primary | ICD-10-CM

## 2022-01-01 DIAGNOSIS — Z71.2 ENCOUNTER TO DISCUSS TEST RESULTS: ICD-10-CM

## 2022-01-01 DIAGNOSIS — C79.51 SECONDARY MALIGNANT NEOPLASM OF BONE: ICD-10-CM

## 2022-01-01 DIAGNOSIS — I50.42 CHRONIC COMBINED SYSTOLIC AND DIASTOLIC HEART FAILURE: Chronic | ICD-10-CM

## 2022-01-01 DIAGNOSIS — J96.21 ACUTE ON CHRONIC RESPIRATORY FAILURE WITH HYPOXIA: ICD-10-CM

## 2022-01-01 DIAGNOSIS — R06.02 SHORTNESS OF BREATH: ICD-10-CM

## 2022-01-01 DIAGNOSIS — Z09 FOLLOW-UP EXAM: ICD-10-CM

## 2022-01-01 DIAGNOSIS — J44.9 CHRONIC OBSTRUCTIVE PULMONARY DISEASE, UNSPECIFIED COPD TYPE: ICD-10-CM

## 2022-01-01 DIAGNOSIS — C34.02 LUNG CANCER, MAIN BRONCHUS, LEFT: Primary | ICD-10-CM

## 2022-01-01 DIAGNOSIS — I48.19 PERSISTENT ATRIAL FIBRILLATION: ICD-10-CM

## 2022-01-01 DIAGNOSIS — I25.2 HISTORY OF NON-ST ELEVATION MYOCARDIAL INFARCTION (NSTEMI): ICD-10-CM

## 2022-01-01 DIAGNOSIS — J44.1 COPD EXACERBATION: Primary | ICD-10-CM

## 2022-01-01 DIAGNOSIS — C34.02 LUNG CANCER, MAIN BRONCHUS, LEFT: ICD-10-CM

## 2022-01-01 DIAGNOSIS — Z09 HOSPITAL DISCHARGE FOLLOW-UP: Primary | ICD-10-CM

## 2022-01-01 DIAGNOSIS — C34.90 LUNG CANCER: ICD-10-CM

## 2022-01-01 DIAGNOSIS — I73.9 PERIPHERAL ARTERIAL DISEASE: Primary | ICD-10-CM

## 2022-01-01 DIAGNOSIS — Z09 CHEMOTHERAPY FOLLOW-UP EXAMINATION: ICD-10-CM

## 2022-01-01 DIAGNOSIS — R00.0 TACHYCARDIA: ICD-10-CM

## 2022-01-01 DIAGNOSIS — I50.42 CHRONIC COMBINED SYSTOLIC AND DIASTOLIC HEART FAILURE: ICD-10-CM

## 2022-01-01 DIAGNOSIS — I10 BENIGN ESSENTIAL HTN: ICD-10-CM

## 2022-01-01 DIAGNOSIS — J96.01 ACUTE RESPIRATORY FAILURE WITH HYPOXIA AND HYPERCARBIA: ICD-10-CM

## 2022-01-01 DIAGNOSIS — D53.9 MACROCYTIC ANEMIA: ICD-10-CM

## 2022-01-01 DIAGNOSIS — L03.116 LEFT LEG CELLULITIS: ICD-10-CM

## 2022-01-01 DIAGNOSIS — C79.51 SECONDARY MALIGNANT NEOPLASM OF BONE: Primary | ICD-10-CM

## 2022-01-01 DIAGNOSIS — Z51.11 ENCOUNTER FOR ANTINEOPLASTIC CHEMOTHERAPY: ICD-10-CM

## 2022-01-01 DIAGNOSIS — I80.9 PHLEBITIS AFTER INFUSION, SEQUELA: ICD-10-CM

## 2022-01-01 DIAGNOSIS — J96.12 CHRONIC RESPIRATORY FAILURE WITH HYPOXIA AND HYPERCAPNIA: Primary | ICD-10-CM

## 2022-01-01 DIAGNOSIS — Z79.899 IMMUNODEFICIENCY DUE TO DRUGS: Chronic | ICD-10-CM

## 2022-01-01 DIAGNOSIS — J96.21 ACUTE ON CHRONIC RESPIRATORY FAILURE WITH HYPOXIA: Primary | ICD-10-CM

## 2022-01-01 DIAGNOSIS — J44.1 CHRONIC OBSTRUCTIVE PULMONARY DISEASE WITH ACUTE EXACERBATION: ICD-10-CM

## 2022-01-01 DIAGNOSIS — M25.512 CHRONIC LEFT SHOULDER PAIN: ICD-10-CM

## 2022-01-01 DIAGNOSIS — R53.81 DEBILITY: ICD-10-CM

## 2022-01-01 DIAGNOSIS — Z09 HOSPITAL DISCHARGE FOLLOW-UP: ICD-10-CM

## 2022-01-01 DIAGNOSIS — C34.02 LUNG CANCER, MAIN BRONCHUS, LEFT: Chronic | ICD-10-CM

## 2022-01-01 DIAGNOSIS — J90 PLEURAL EFFUSION: ICD-10-CM

## 2022-01-01 DIAGNOSIS — R63.0 POOR APPETITE: ICD-10-CM

## 2022-01-01 DIAGNOSIS — R09.02 HYPOXIA: ICD-10-CM

## 2022-01-01 DIAGNOSIS — C34.02 LUNG CANCER, MAIN BRONCHUS, LEFT: Primary | Chronic | ICD-10-CM

## 2022-01-01 DIAGNOSIS — J96.02 ACUTE RESPIRATORY FAILURE WITH HYPOXIA AND HYPERCARBIA: ICD-10-CM

## 2022-01-01 DIAGNOSIS — R60.0 LOCALIZED EDEMA: ICD-10-CM

## 2022-01-01 DIAGNOSIS — R06.02 SOB (SHORTNESS OF BREATH): ICD-10-CM

## 2022-01-01 DIAGNOSIS — I25.10 CORONARY ARTERY DISEASE INVOLVING NATIVE CORONARY ARTERY OF NATIVE HEART WITHOUT ANGINA PECTORIS: Chronic | ICD-10-CM

## 2022-01-01 DIAGNOSIS — Z79.899 IMMUNODEFICIENCY DUE TO DRUGS: ICD-10-CM

## 2022-01-01 DIAGNOSIS — R65.10 SIRS (SYSTEMIC INFLAMMATORY RESPONSE SYNDROME): ICD-10-CM

## 2022-01-01 DIAGNOSIS — Z51.5 PALLIATIVE CARE ENCOUNTER: ICD-10-CM

## 2022-01-01 DIAGNOSIS — J96.11 CHRONIC RESPIRATORY FAILURE WITH HYPOXIA AND HYPERCAPNIA: Primary | ICD-10-CM

## 2022-01-01 DIAGNOSIS — J96.01 ACUTE HYPOXEMIC RESPIRATORY FAILURE: ICD-10-CM

## 2022-01-01 DIAGNOSIS — I48.91 A-FIB: ICD-10-CM

## 2022-01-01 DIAGNOSIS — Z87.891 FORMER SMOKER: Primary | ICD-10-CM

## 2022-01-01 DIAGNOSIS — I70.0 ATHEROSCLEROSIS OF AORTA: ICD-10-CM

## 2022-01-01 DIAGNOSIS — Z86.73 HISTORY OF COMPLETED STROKE: ICD-10-CM

## 2022-01-01 DIAGNOSIS — M79.89 LEG SWELLING: ICD-10-CM

## 2022-01-01 DIAGNOSIS — I50.31 ACUTE DIASTOLIC CONGESTIVE HEART FAILURE: ICD-10-CM

## 2022-01-01 DIAGNOSIS — J18.9 PNEUMONIA OF BOTH LUNGS DUE TO INFECTIOUS ORGANISM, UNSPECIFIED PART OF LUNG: Primary | ICD-10-CM

## 2022-01-01 DIAGNOSIS — Z99.81 DEPENDENCE ON SUPPLEMENTAL OXYGEN: ICD-10-CM

## 2022-01-01 DIAGNOSIS — E87.6 HYPOKALEMIA: ICD-10-CM

## 2022-01-01 DIAGNOSIS — D84.821 IMMUNODEFICIENCY DUE TO DRUGS: Chronic | ICD-10-CM

## 2022-01-01 DIAGNOSIS — D63.8 ANEMIA, CHRONIC DISEASE: ICD-10-CM

## 2022-01-01 DIAGNOSIS — C34.90 MALIGNANT NEOPLASM OF UNSPECIFIED PART OF UNSPECIFIED BRONCHUS OR LUNG: Primary | ICD-10-CM

## 2022-01-01 DIAGNOSIS — T80.1XXS PHLEBITIS AFTER INFUSION, SEQUELA: ICD-10-CM

## 2022-01-01 LAB
ALBUMIN FLD-MCNC: 1.8 G/DL
ALBUMIN SERPL BCP-MCNC: 2.5 G/DL (ref 3.5–5.2)
ALBUMIN SERPL BCP-MCNC: 2.6 G/DL (ref 3.5–5.2)
ALBUMIN SERPL BCP-MCNC: 2.8 G/DL (ref 3.5–5.2)
ALBUMIN SERPL BCP-MCNC: 3 G/DL (ref 3.5–5.2)
ALBUMIN SERPL BCP-MCNC: 3 G/DL (ref 3.5–5.2)
ALBUMIN SERPL BCP-MCNC: 3.2 G/DL (ref 3.5–5.2)
ALBUMIN SERPL BCP-MCNC: 3.4 G/DL (ref 3.5–5.2)
ALBUMIN SERPL BCP-MCNC: 3.4 G/DL (ref 3.5–5.2)
ALBUMIN SERPL BCP-MCNC: 3.5 G/DL (ref 3.5–5.2)
ALBUMIN SERPL BCP-MCNC: 3.8 G/DL (ref 3.5–5.2)
ALBUMIN SERPL BCP-MCNC: 3.9 G/DL (ref 3.5–5.2)
ALBUMIN SERPL BCP-MCNC: 3.9 G/DL (ref 3.5–5.2)
ALBUMIN SERPL BCP-MCNC: 4 G/DL (ref 3.5–5.2)
ALLENS TEST: ABNORMAL
ALLENS TEST: ABNORMAL
ALP SERPL-CCNC: 102 U/L (ref 55–135)
ALP SERPL-CCNC: 107 U/L (ref 55–135)
ALP SERPL-CCNC: 108 U/L (ref 55–135)
ALP SERPL-CCNC: 108 U/L (ref 55–135)
ALP SERPL-CCNC: 138 U/L (ref 55–135)
ALP SERPL-CCNC: 76 U/L (ref 55–135)
ALP SERPL-CCNC: 79 U/L (ref 55–135)
ALP SERPL-CCNC: 81 U/L (ref 55–135)
ALP SERPL-CCNC: 87 U/L (ref 55–135)
ALP SERPL-CCNC: 88 U/L (ref 55–135)
ALP SERPL-CCNC: 88 U/L (ref 55–135)
ALP SERPL-CCNC: 91 U/L (ref 55–135)
ALP SERPL-CCNC: 92 U/L (ref 55–135)
ALP SERPL-CCNC: 99 U/L (ref 55–135)
ALT SERPL W/O P-5'-P-CCNC: 10 U/L (ref 10–44)
ALT SERPL W/O P-5'-P-CCNC: 12 U/L (ref 10–44)
ALT SERPL W/O P-5'-P-CCNC: 12 U/L (ref 10–44)
ALT SERPL W/O P-5'-P-CCNC: 13 U/L (ref 10–44)
ALT SERPL W/O P-5'-P-CCNC: 13 U/L (ref 10–44)
ALT SERPL W/O P-5'-P-CCNC: 14 U/L (ref 10–44)
ALT SERPL W/O P-5'-P-CCNC: 16 U/L (ref 10–44)
ALT SERPL W/O P-5'-P-CCNC: 18 U/L (ref 10–44)
ALT SERPL W/O P-5'-P-CCNC: 19 U/L (ref 10–44)
ALT SERPL W/O P-5'-P-CCNC: 20 U/L (ref 10–44)
ALT SERPL W/O P-5'-P-CCNC: 22 U/L (ref 10–44)
ALT SERPL W/O P-5'-P-CCNC: 8 U/L (ref 10–44)
ALT SERPL W/O P-5'-P-CCNC: 8 U/L (ref 10–44)
ALT SERPL W/O P-5'-P-CCNC: 9 U/L (ref 10–44)
AMMONIA PLAS-SCNC: 21 UMOL/L (ref 10–50)
ANION GAP SERPL CALC-SCNC: 10 MMOL/L (ref 8–16)
ANION GAP SERPL CALC-SCNC: 10 MMOL/L (ref 8–16)
ANION GAP SERPL CALC-SCNC: 11 MMOL/L (ref 8–16)
ANION GAP SERPL CALC-SCNC: 12 MMOL/L (ref 8–16)
ANION GAP SERPL CALC-SCNC: 13 MMOL/L (ref 8–16)
ANION GAP SERPL CALC-SCNC: 13 MMOL/L (ref 8–16)
ANION GAP SERPL CALC-SCNC: 14 MMOL/L (ref 8–16)
ANION GAP SERPL CALC-SCNC: 6 MMOL/L (ref 8–16)
ANION GAP SERPL CALC-SCNC: 7 MMOL/L (ref 8–16)
ANION GAP SERPL CALC-SCNC: 8 MMOL/L (ref 8–16)
ANION GAP SERPL CALC-SCNC: 9 MMOL/L (ref 8–16)
AORTIC ROOT ANNULUS: 3.89 CM
AORTIC VALVE CUSP SEPERATION: 2.32 CM
APPEARANCE FLD: NORMAL
ASCENDING AORTA: 2.86 CM
AST SERPL-CCNC: 10 U/L (ref 10–40)
AST SERPL-CCNC: 12 U/L (ref 10–40)
AST SERPL-CCNC: 13 U/L (ref 10–40)
AST SERPL-CCNC: 14 U/L (ref 10–40)
AST SERPL-CCNC: 16 U/L (ref 10–40)
AST SERPL-CCNC: 17 U/L (ref 10–40)
AST SERPL-CCNC: 18 U/L (ref 10–40)
AST SERPL-CCNC: 20 U/L (ref 10–40)
AST SERPL-CCNC: 23 U/L (ref 10–40)
AV INDEX (PROSTH): 0.93
AV MEAN GRADIENT: 4 MMHG
AV PEAK GRADIENT: 7 MMHG
AV VALVE AREA: 3.77 CM2
AV VELOCITY RATIO: 0.76
BACTERIA #/AREA URNS HPF: NEGATIVE /HPF
BACTERIA BLD CULT: NORMAL
BACTERIA SPEC AEROBE CULT: NO GROWTH
BACTERIA SPEC AEROBE CULT: NORMAL
BACTERIA SPEC AEROBE CULT: NORMAL
BACTERIA SPEC ANAEROBE CULT: NORMAL
BASOPHILS # BLD AUTO: 0 K/UL (ref 0–0.2)
BASOPHILS # BLD AUTO: 0 K/UL (ref 0–0.2)
BASOPHILS # BLD AUTO: 0.01 K/UL (ref 0–0.2)
BASOPHILS # BLD AUTO: 0.02 K/UL (ref 0–0.2)
BASOPHILS # BLD AUTO: 0.03 K/UL (ref 0–0.2)
BASOPHILS # BLD AUTO: 0.04 K/UL (ref 0–0.2)
BASOPHILS # BLD AUTO: 0.06 K/UL (ref 0–0.2)
BASOPHILS # BLD AUTO: 0.06 K/UL (ref 0–0.2)
BASOPHILS # BLD AUTO: 0.07 K/UL (ref 0–0.2)
BASOPHILS # BLD AUTO: 0.08 K/UL (ref 0–0.2)
BASOPHILS # BLD AUTO: 0.1 K/UL (ref 0–0.2)
BASOPHILS # BLD AUTO: 0.11 K/UL (ref 0–0.2)
BASOPHILS NFR BLD: 0 % (ref 0–1.9)
BASOPHILS NFR BLD: 0 % (ref 0–1.9)
BASOPHILS NFR BLD: 0.1 % (ref 0–1.9)
BASOPHILS NFR BLD: 0.2 % (ref 0–1.9)
BASOPHILS NFR BLD: 0.2 % (ref 0–1.9)
BASOPHILS NFR BLD: 0.3 % (ref 0–1.9)
BASOPHILS NFR BLD: 0.3 % (ref 0–1.9)
BASOPHILS NFR BLD: 0.4 % (ref 0–1.9)
BASOPHILS NFR BLD: 0.6 % (ref 0–1.9)
BASOPHILS NFR BLD: 0.6 % (ref 0–1.9)
BASOPHILS NFR BLD: 0.7 % (ref 0–1.9)
BASOPHILS NFR BLD: 0.7 % (ref 0–1.9)
BASOPHILS NFR BLD: 1 % (ref 0–1.9)
BASOPHILS NFR BLD: 1.2 % (ref 0–1.9)
BILIRUB SERPL-MCNC: 0.2 MG/DL (ref 0.1–1)
BILIRUB SERPL-MCNC: 0.4 MG/DL (ref 0.1–1)
BILIRUB SERPL-MCNC: 0.5 MG/DL (ref 0.1–1)
BILIRUB SERPL-MCNC: 0.5 MG/DL (ref 0.1–1)
BILIRUB SERPL-MCNC: 0.6 MG/DL (ref 0.1–1)
BILIRUB SERPL-MCNC: 0.6 MG/DL (ref 0.1–1)
BILIRUB SERPL-MCNC: 0.7 MG/DL (ref 0.1–1)
BILIRUB SERPL-MCNC: 0.8 MG/DL (ref 0.1–1)
BILIRUB SERPL-MCNC: 0.8 MG/DL (ref 0.1–1)
BILIRUB SERPL-MCNC: 0.9 MG/DL (ref 0.1–1)
BILIRUB SERPL-MCNC: 1 MG/DL (ref 0.1–1)
BILIRUB SERPL-MCNC: 1.1 MG/DL (ref 0.1–1)
BILIRUB SERPL-MCNC: 1.6 MG/DL (ref 0.1–1)
BILIRUB UR QL STRIP: NEGATIVE
BNP SERPL-MCNC: 173 PG/ML (ref 0–99)
BNP SERPL-MCNC: 271 PG/ML (ref 0–99)
BNP SERPL-MCNC: 355 PG/ML (ref 0–99)
BNP SERPL-MCNC: 447 PG/ML (ref 0–99)
BODY FLD TYPE: NORMAL
BODY FLUID SOURCE, LDH: NORMAL
BSA FOR ECHO PROCEDURE: 2.11 M2
BUN SERPL-MCNC: 10 MG/DL (ref 8–23)
BUN SERPL-MCNC: 11 MG/DL (ref 8–23)
BUN SERPL-MCNC: 12 MG/DL (ref 8–23)
BUN SERPL-MCNC: 13 MG/DL (ref 8–23)
BUN SERPL-MCNC: 14 MG/DL (ref 8–23)
BUN SERPL-MCNC: 14 MG/DL (ref 8–23)
BUN SERPL-MCNC: 15 MG/DL (ref 8–23)
BUN SERPL-MCNC: 16 MG/DL (ref 8–23)
BUN SERPL-MCNC: 16 MG/DL (ref 8–23)
BUN SERPL-MCNC: 17 MG/DL (ref 8–23)
BUN SERPL-MCNC: 18 MG/DL (ref 8–23)
BUN SERPL-MCNC: 18 MG/DL (ref 8–23)
BUN SERPL-MCNC: 19 MG/DL (ref 8–23)
BUN SERPL-MCNC: 20 MG/DL (ref 8–23)
BUN SERPL-MCNC: 21 MG/DL (ref 8–23)
BUN SERPL-MCNC: 24 MG/DL (ref 8–23)
BUN SERPL-MCNC: 34 MG/DL (ref 8–23)
BUN SERPL-MCNC: 39 MG/DL (ref 8–23)
BUN SERPL-MCNC: 40 MG/DL (ref 8–23)
BUN SERPL-MCNC: 9 MG/DL (ref 8–23)
CALCIUM SERPL-MCNC: 8.2 MG/DL (ref 8.7–10.5)
CALCIUM SERPL-MCNC: 8.4 MG/DL (ref 8.7–10.5)
CALCIUM SERPL-MCNC: 8.5 MG/DL (ref 8.7–10.5)
CALCIUM SERPL-MCNC: 8.6 MG/DL (ref 8.7–10.5)
CALCIUM SERPL-MCNC: 8.6 MG/DL (ref 8.7–10.5)
CALCIUM SERPL-MCNC: 8.7 MG/DL (ref 8.7–10.5)
CALCIUM SERPL-MCNC: 8.9 MG/DL (ref 8.7–10.5)
CALCIUM SERPL-MCNC: 8.9 MG/DL (ref 8.7–10.5)
CALCIUM SERPL-MCNC: 9 MG/DL (ref 8.7–10.5)
CALCIUM SERPL-MCNC: 9 MG/DL (ref 8.7–10.5)
CALCIUM SERPL-MCNC: 9.1 MG/DL (ref 8.7–10.5)
CALCIUM SERPL-MCNC: 9.2 MG/DL (ref 8.7–10.5)
CALCIUM SERPL-MCNC: 9.2 MG/DL (ref 8.7–10.5)
CALCIUM SERPL-MCNC: 9.3 MG/DL (ref 8.7–10.5)
CALCIUM SERPL-MCNC: 9.4 MG/DL (ref 8.7–10.5)
CALCIUM SERPL-MCNC: 9.6 MG/DL (ref 8.7–10.5)
CHLORIDE SERPL-SCNC: 100 MMOL/L (ref 95–110)
CHLORIDE SERPL-SCNC: 101 MMOL/L (ref 95–110)
CHLORIDE SERPL-SCNC: 101 MMOL/L (ref 95–110)
CHLORIDE SERPL-SCNC: 102 MMOL/L (ref 95–110)
CHLORIDE SERPL-SCNC: 103 MMOL/L (ref 95–110)
CHLORIDE SERPL-SCNC: 104 MMOL/L (ref 95–110)
CHLORIDE SERPL-SCNC: 104 MMOL/L (ref 95–110)
CHLORIDE SERPL-SCNC: 105 MMOL/L (ref 95–110)
CHLORIDE SERPL-SCNC: 106 MMOL/L (ref 95–110)
CHLORIDE SERPL-SCNC: 108 MMOL/L (ref 95–110)
CHLORIDE SERPL-SCNC: 98 MMOL/L (ref 95–110)
CHLORIDE SERPL-SCNC: 99 MMOL/L (ref 95–110)
CK MB SERPL-MCNC: 5.1 NG/ML (ref 0.1–6.5)
CK SERPL-CCNC: 52 U/L (ref 20–200)
CLARITY UR: CLEAR
CO2 SERPL-SCNC: 19 MMOL/L (ref 23–29)
CO2 SERPL-SCNC: 23 MMOL/L (ref 23–29)
CO2 SERPL-SCNC: 24 MMOL/L (ref 23–29)
CO2 SERPL-SCNC: 25 MMOL/L (ref 23–29)
CO2 SERPL-SCNC: 26 MMOL/L (ref 23–29)
CO2 SERPL-SCNC: 28 MMOL/L (ref 23–29)
CO2 SERPL-SCNC: 28 MMOL/L (ref 23–29)
CO2 SERPL-SCNC: 29 MMOL/L (ref 23–29)
CO2 SERPL-SCNC: 31 MMOL/L (ref 23–29)
CO2 SERPL-SCNC: 31 MMOL/L (ref 23–29)
COLOR FLD: YELLOW
COLOR UR: COLORLESS
COLOR UR: YELLOW
COLOR UR: YELLOW
CREAT SERPL-MCNC: 0.8 MG/DL (ref 0.5–1.4)
CREAT SERPL-MCNC: 0.9 MG/DL (ref 0.5–1.4)
CREAT SERPL-MCNC: 1 MG/DL (ref 0.5–1.4)
CREAT SERPL-MCNC: 1.2 MG/DL (ref 0.5–1.4)
CREAT SERPL-MCNC: 1.3 MG/DL (ref 0.5–1.4)
CREAT SERPL-MCNC: 1.3 MG/DL (ref 0.5–1.4)
CREAT SERPL-MCNC: 1.4 MG/DL (ref 0.5–1.4)
CRP SERPL-MCNC: 89.8 MG/L (ref 0–8.2)
CTP QC/QA: YES
CTP QC/QA: YES
CV ECHO LV RWT: 0.51 CM
D DIMER PPP IA.FEU-MCNC: 1.94 MG/L FEU
DELSYS: ABNORMAL
DELSYS: ABNORMAL
DIFFERENTIAL METHOD: ABNORMAL
DOP CALC AO PEAK VEL: 1.34 M/S
DOP CALC AO VTI: 16 CM
DOP CALC LVOT AREA: 4 CM2
DOP CALC LVOT DIAMETER: 2.27 CM
DOP CALC LVOT PEAK VEL: 1.02 M/S
DOP CALC LVOT STROKE VOLUME: 60.27 CM3
DOP CALCLVOT PEAK VEL VTI: 14.9 CM
E/E' RATIO: 7.91 M/S
ECHO LV POSTERIOR WALL: 1.11 CM (ref 0.6–1.1)
EJECTION FRACTION: 60 %
EOSINOPHIL # BLD AUTO: 0 K/UL (ref 0–0.5)
EOSINOPHIL # BLD AUTO: 0.1 K/UL (ref 0–0.5)
EOSINOPHIL # BLD AUTO: 0.2 K/UL (ref 0–0.5)
EOSINOPHIL NFR BLD: 0 % (ref 0–8)
EOSINOPHIL NFR BLD: 0.1 % (ref 0–8)
EOSINOPHIL NFR BLD: 0.1 % (ref 0–8)
EOSINOPHIL NFR BLD: 0.2 % (ref 0–8)
EOSINOPHIL NFR BLD: 0.4 % (ref 0–8)
EOSINOPHIL NFR BLD: 0.5 % (ref 0–8)
EOSINOPHIL NFR BLD: 0.8 % (ref 0–8)
EOSINOPHIL NFR BLD: 0.9 % (ref 0–8)
EOSINOPHIL NFR BLD: 1.8 % (ref 0–8)
EOSINOPHIL NFR BLD: 2.4 % (ref 0–8)
EOSINOPHIL NFR FLD MANUAL: 1 %
ERYTHROCYTE [DISTWIDTH] IN BLOOD BY AUTOMATED COUNT: 14.4 % (ref 11.5–14.5)
ERYTHROCYTE [DISTWIDTH] IN BLOOD BY AUTOMATED COUNT: 14.6 % (ref 11.5–14.5)
ERYTHROCYTE [DISTWIDTH] IN BLOOD BY AUTOMATED COUNT: 14.6 % (ref 11.5–14.5)
ERYTHROCYTE [DISTWIDTH] IN BLOOD BY AUTOMATED COUNT: 14.7 % (ref 11.5–14.5)
ERYTHROCYTE [DISTWIDTH] IN BLOOD BY AUTOMATED COUNT: 14.8 % (ref 11.5–14.5)
ERYTHROCYTE [DISTWIDTH] IN BLOOD BY AUTOMATED COUNT: 14.9 % (ref 11.5–14.5)
ERYTHROCYTE [DISTWIDTH] IN BLOOD BY AUTOMATED COUNT: 15 % (ref 11.5–14.5)
ERYTHROCYTE [DISTWIDTH] IN BLOOD BY AUTOMATED COUNT: 15.2 % (ref 11.5–14.5)
ERYTHROCYTE [DISTWIDTH] IN BLOOD BY AUTOMATED COUNT: 15.2 % (ref 11.5–14.5)
ERYTHROCYTE [DISTWIDTH] IN BLOOD BY AUTOMATED COUNT: 15.3 % (ref 11.5–14.5)
ERYTHROCYTE [DISTWIDTH] IN BLOOD BY AUTOMATED COUNT: 15.4 % (ref 11.5–14.5)
ERYTHROCYTE [DISTWIDTH] IN BLOOD BY AUTOMATED COUNT: 15.4 % (ref 11.5–14.5)
ERYTHROCYTE [DISTWIDTH] IN BLOOD BY AUTOMATED COUNT: 15.8 % (ref 11.5–14.5)
ERYTHROCYTE [DISTWIDTH] IN BLOOD BY AUTOMATED COUNT: 15.9 % (ref 11.5–14.5)
ERYTHROCYTE [DISTWIDTH] IN BLOOD BY AUTOMATED COUNT: 16.2 % (ref 11.5–14.5)
EST. GFR  (NO RACE VARIABLE): 57 ML/MIN/1.73 M^2
EST. GFR  (NO RACE VARIABLE): >60 ML/MIN/1.73 M^2
ETHANOL SERPL-MCNC: <5 MG/DL
FIO2: 100
FLOW: 12
FLOW: 4
FOLATE SERPL-MCNC: 4.6 NG/ML (ref 4–24)
FRACTIONAL SHORTENING: 25 % (ref 28–44)
GLUCOSE FLD-MCNC: 151 MG/DL
GLUCOSE SERPL-MCNC: 104 MG/DL (ref 70–110)
GLUCOSE SERPL-MCNC: 116 MG/DL (ref 70–110)
GLUCOSE SERPL-MCNC: 122 MG/DL (ref 70–110)
GLUCOSE SERPL-MCNC: 123 MG/DL (ref 70–110)
GLUCOSE SERPL-MCNC: 123 MG/DL (ref 70–110)
GLUCOSE SERPL-MCNC: 126 MG/DL (ref 70–110)
GLUCOSE SERPL-MCNC: 127 MG/DL (ref 70–110)
GLUCOSE SERPL-MCNC: 127 MG/DL (ref 70–110)
GLUCOSE SERPL-MCNC: 128 MG/DL (ref 70–110)
GLUCOSE SERPL-MCNC: 132 MG/DL (ref 70–110)
GLUCOSE SERPL-MCNC: 137 MG/DL (ref 70–110)
GLUCOSE SERPL-MCNC: 139 MG/DL (ref 70–110)
GLUCOSE SERPL-MCNC: 152 MG/DL (ref 70–110)
GLUCOSE SERPL-MCNC: 153 MG/DL (ref 70–110)
GLUCOSE SERPL-MCNC: 155 MG/DL (ref 70–110)
GLUCOSE SERPL-MCNC: 159 MG/DL (ref 70–110)
GLUCOSE SERPL-MCNC: 170 MG/DL (ref 70–110)
GLUCOSE SERPL-MCNC: 172 MG/DL (ref 70–110)
GLUCOSE SERPL-MCNC: 172 MG/DL (ref 70–110)
GLUCOSE SERPL-MCNC: 182 MG/DL (ref 70–110)
GLUCOSE SERPL-MCNC: 197 MG/DL (ref 70–110)
GLUCOSE SERPL-MCNC: 199 MG/DL (ref 70–110)
GLUCOSE SERPL-MCNC: 209 MG/DL (ref 70–110)
GLUCOSE SERPL-MCNC: 384 MG/DL (ref 70–110)
GLUCOSE UR QL STRIP: NEGATIVE
GRAM STN SPEC: NORMAL
HCO3 UR-SCNC: 20.4 MMOL/L (ref 24–28)
HCO3 UR-SCNC: 27.1 MMOL/L (ref 24–28)
HCT VFR BLD AUTO: 29.3 % (ref 40–54)
HCT VFR BLD AUTO: 31 % (ref 40–54)
HCT VFR BLD AUTO: 31.4 % (ref 40–54)
HCT VFR BLD AUTO: 33.3 % (ref 40–54)
HCT VFR BLD AUTO: 33.5 % (ref 40–54)
HCT VFR BLD AUTO: 33.9 % (ref 40–54)
HCT VFR BLD AUTO: 34.3 % (ref 40–54)
HCT VFR BLD AUTO: 34.7 % (ref 40–54)
HCT VFR BLD AUTO: 35.1 % (ref 40–54)
HCT VFR BLD AUTO: 35.4 % (ref 40–54)
HCT VFR BLD AUTO: 37 % (ref 40–54)
HCT VFR BLD AUTO: 37.1 % (ref 40–54)
HCT VFR BLD AUTO: 37.7 % (ref 40–54)
HCT VFR BLD AUTO: 38.4 % (ref 40–54)
HCT VFR BLD AUTO: 38.4 % (ref 40–54)
HCT VFR BLD AUTO: 39.3 % (ref 40–54)
HCT VFR BLD AUTO: 40.6 % (ref 40–54)
HCT VFR BLD AUTO: 43.1 % (ref 40–54)
HCT VFR BLD AUTO: 45.9 % (ref 40–54)
HGB BLD-MCNC: 10 G/DL (ref 14–18)
HGB BLD-MCNC: 10.9 G/DL (ref 14–18)
HGB BLD-MCNC: 11 G/DL (ref 14–18)
HGB BLD-MCNC: 11.4 G/DL (ref 14–18)
HGB BLD-MCNC: 11.5 G/DL (ref 14–18)
HGB BLD-MCNC: 11.6 G/DL (ref 14–18)
HGB BLD-MCNC: 11.7 G/DL (ref 14–18)
HGB BLD-MCNC: 12.2 G/DL (ref 14–18)
HGB BLD-MCNC: 12.5 G/DL (ref 14–18)
HGB BLD-MCNC: 12.5 G/DL (ref 14–18)
HGB BLD-MCNC: 12.7 G/DL (ref 14–18)
HGB BLD-MCNC: 12.7 G/DL (ref 14–18)
HGB BLD-MCNC: 13 G/DL (ref 14–18)
HGB BLD-MCNC: 13.1 G/DL (ref 14–18)
HGB BLD-MCNC: 13.8 G/DL (ref 14–18)
HGB BLD-MCNC: 14.1 G/DL (ref 14–18)
HGB BLD-MCNC: 15.4 G/DL (ref 14–18)
HGB UR QL STRIP: ABNORMAL
HGB UR QL STRIP: ABNORMAL
HGB UR QL STRIP: NEGATIVE
HYALINE CASTS #/AREA URNS LPF: 1 /LPF
HYALINE CASTS #/AREA URNS LPF: 4 /LPF
IMM GRANULOCYTES # BLD AUTO: 0.01 K/UL (ref 0–0.04)
IMM GRANULOCYTES # BLD AUTO: 0.03 K/UL (ref 0–0.04)
IMM GRANULOCYTES # BLD AUTO: 0.03 K/UL (ref 0–0.04)
IMM GRANULOCYTES # BLD AUTO: 0.04 K/UL (ref 0–0.04)
IMM GRANULOCYTES # BLD AUTO: 0.05 K/UL (ref 0–0.04)
IMM GRANULOCYTES # BLD AUTO: 0.06 K/UL (ref 0–0.04)
IMM GRANULOCYTES # BLD AUTO: 0.07 K/UL (ref 0–0.04)
IMM GRANULOCYTES # BLD AUTO: 0.08 K/UL (ref 0–0.04)
IMM GRANULOCYTES # BLD AUTO: 0.08 K/UL (ref 0–0.04)
IMM GRANULOCYTES # BLD AUTO: 0.09 K/UL (ref 0–0.04)
IMM GRANULOCYTES # BLD AUTO: 0.09 K/UL (ref 0–0.04)
IMM GRANULOCYTES # BLD AUTO: 0.11 K/UL (ref 0–0.04)
IMM GRANULOCYTES # BLD AUTO: 0.12 K/UL (ref 0–0.04)
IMM GRANULOCYTES # BLD AUTO: 0.13 K/UL (ref 0–0.04)
IMM GRANULOCYTES # BLD AUTO: 0.21 K/UL (ref 0–0.04)
IMM GRANULOCYTES # BLD AUTO: 0.41 K/UL (ref 0–0.04)
IMM GRANULOCYTES NFR BLD AUTO: 0.1 % (ref 0–0.5)
IMM GRANULOCYTES NFR BLD AUTO: 0.3 % (ref 0–0.5)
IMM GRANULOCYTES NFR BLD AUTO: 0.3 % (ref 0–0.5)
IMM GRANULOCYTES NFR BLD AUTO: 0.4 % (ref 0–0.5)
IMM GRANULOCYTES NFR BLD AUTO: 0.5 % (ref 0–0.5)
IMM GRANULOCYTES NFR BLD AUTO: 0.6 % (ref 0–0.5)
IMM GRANULOCYTES NFR BLD AUTO: 0.7 % (ref 0–0.5)
IMM GRANULOCYTES NFR BLD AUTO: 0.8 % (ref 0–0.5)
IMM GRANULOCYTES NFR BLD AUTO: 0.8 % (ref 0–0.5)
IMM GRANULOCYTES NFR BLD AUTO: 1.3 % (ref 0–0.5)
IMM GRANULOCYTES NFR BLD AUTO: 2.6 % (ref 0–0.5)
IMM GRANULOCYTES NFR BLD AUTO: 4.8 % (ref 0–0.5)
INTERVENTRICULAR SEPTUM: 1.04 CM (ref 0.6–1.1)
IVC DIAMETER: 1.84 CM
IVRT: 91.34 MSEC
KETONES UR QL STRIP: NEGATIVE
LA MINOR: 4.78 CM
LA WIDTH: 4.8 CM
LACTATE SERPL-SCNC: 1.9 MMOL/L (ref 0.5–1.9)
LACTATE SERPL-SCNC: 1.9 MMOL/L (ref 0.5–1.9)
LACTATE SERPL-SCNC: 1.9 MMOL/L (ref 0.5–2.2)
LACTATE SERPL-SCNC: 1.9 MMOL/L (ref 0.5–2.2)
LACTATE SERPL-SCNC: 2.2 MMOL/L (ref 0.5–1.9)
LACTATE SERPL-SCNC: 2.3 MMOL/L (ref 0.5–2.2)
LACTATE SERPL-SCNC: 2.7 MMOL/L (ref 0.5–2.2)
LDH FLD L TO P-CCNC: 271 U/L
LEFT ATRIUM SIZE: 4.68 CM
LEFT INTERNAL DIMENSION IN SYSTOLE: 3.27 CM (ref 2.1–4)
LEFT VENTRICLE DIASTOLIC VOLUME INDEX: 41.21 ML/M2
LEFT VENTRICLE DIASTOLIC VOLUME: 85.31 ML
LEFT VENTRICLE MASS INDEX: 78 G/M2
LEFT VENTRICLE SYSTOLIC VOLUME INDEX: 20.9 ML/M2
LEFT VENTRICLE SYSTOLIC VOLUME: 43.16 ML
LEFT VENTRICULAR INTERNAL DIMENSION IN DIASTOLE: 4.35 CM (ref 3.5–6)
LEFT VENTRICULAR MASS: 160.6 G
LEUKOCYTE ESTERASE UR QL STRIP: NEGATIVE
LV LATERAL E/E' RATIO: 7 M/S
LV SEPTAL E/E' RATIO: 9.1 M/S
LVOT MG: 2.26 MMHG
LVOT MV: 0.7 CM/S
LYMPHOCYTES # BLD AUTO: 0.2 K/UL (ref 1–4.8)
LYMPHOCYTES # BLD AUTO: 0.3 K/UL (ref 1–4.8)
LYMPHOCYTES # BLD AUTO: 0.5 K/UL (ref 1–4.8)
LYMPHOCYTES # BLD AUTO: 0.6 K/UL (ref 1–4.8)
LYMPHOCYTES # BLD AUTO: 0.7 K/UL (ref 1–4.8)
LYMPHOCYTES # BLD AUTO: 0.8 K/UL (ref 1–4.8)
LYMPHOCYTES # BLD AUTO: 0.9 K/UL (ref 1–4.8)
LYMPHOCYTES # BLD AUTO: 0.9 K/UL (ref 1–4.8)
LYMPHOCYTES # BLD AUTO: 1.1 K/UL (ref 1–4.8)
LYMPHOCYTES # BLD AUTO: 1.2 K/UL (ref 1–4.8)
LYMPHOCYTES # BLD AUTO: 1.2 K/UL (ref 1–4.8)
LYMPHOCYTES # BLD AUTO: 1.4 K/UL (ref 1–4.8)
LYMPHOCYTES # BLD AUTO: 1.5 K/UL (ref 1–4.8)
LYMPHOCYTES # BLD AUTO: 1.6 K/UL (ref 1–4.8)
LYMPHOCYTES # BLD AUTO: 1.7 K/UL (ref 1–4.8)
LYMPHOCYTES NFR BLD: 1.3 % (ref 18–48)
LYMPHOCYTES NFR BLD: 10.1 % (ref 18–48)
LYMPHOCYTES NFR BLD: 10.7 % (ref 18–48)
LYMPHOCYTES NFR BLD: 12.8 % (ref 18–48)
LYMPHOCYTES NFR BLD: 15.2 % (ref 18–48)
LYMPHOCYTES NFR BLD: 16.8 % (ref 18–48)
LYMPHOCYTES NFR BLD: 18.5 % (ref 18–48)
LYMPHOCYTES NFR BLD: 19.1 % (ref 18–48)
LYMPHOCYTES NFR BLD: 2.1 % (ref 18–48)
LYMPHOCYTES NFR BLD: 2.8 % (ref 18–48)
LYMPHOCYTES NFR BLD: 4 % (ref 18–48)
LYMPHOCYTES NFR BLD: 4.2 % (ref 18–48)
LYMPHOCYTES NFR BLD: 4.6 % (ref 18–48)
LYMPHOCYTES NFR BLD: 4.9 % (ref 18–48)
LYMPHOCYTES NFR BLD: 5 % (ref 18–48)
LYMPHOCYTES NFR BLD: 5 % (ref 18–48)
LYMPHOCYTES NFR BLD: 5.6 % (ref 18–48)
LYMPHOCYTES NFR BLD: 7.5 % (ref 18–48)
LYMPHOCYTES NFR BLD: 9.1 % (ref 18–48)
LYMPHOCYTES NFR FLD MANUAL: 5 %
MAGNESIUM SERPL-MCNC: 1.5 MG/DL (ref 1.6–2.6)
MAGNESIUM SERPL-MCNC: 1.6 MG/DL (ref 1.6–2.6)
MAGNESIUM SERPL-MCNC: 1.7 MG/DL (ref 1.6–2.6)
MAGNESIUM SERPL-MCNC: 1.8 MG/DL (ref 1.6–2.6)
MAGNESIUM SERPL-MCNC: 1.8 MG/DL (ref 1.6–2.6)
MAGNESIUM SERPL-MCNC: 1.9 MG/DL (ref 1.6–2.6)
MAGNESIUM SERPL-MCNC: 2.1 MG/DL (ref 1.6–2.6)
MCH RBC QN AUTO: 29.4 PG (ref 27–31)
MCH RBC QN AUTO: 29.7 PG (ref 27–31)
MCH RBC QN AUTO: 30 PG (ref 27–31)
MCH RBC QN AUTO: 30.1 PG (ref 27–31)
MCH RBC QN AUTO: 30.3 PG (ref 27–31)
MCH RBC QN AUTO: 30.4 PG (ref 27–31)
MCH RBC QN AUTO: 30.6 PG (ref 27–31)
MCH RBC QN AUTO: 30.6 PG (ref 27–31)
MCH RBC QN AUTO: 30.7 PG (ref 27–31)
MCH RBC QN AUTO: 30.7 PG (ref 27–31)
MCH RBC QN AUTO: 30.8 PG (ref 27–31)
MCH RBC QN AUTO: 30.9 PG (ref 27–31)
MCH RBC QN AUTO: 31.1 PG (ref 27–31)
MCHC RBC AUTO-ENTMCNC: 32.7 G/DL (ref 32–36)
MCHC RBC AUTO-ENTMCNC: 33.1 G/DL (ref 32–36)
MCHC RBC AUTO-ENTMCNC: 33.1 G/DL (ref 32–36)
MCHC RBC AUTO-ENTMCNC: 33.2 G/DL (ref 32–36)
MCHC RBC AUTO-ENTMCNC: 33.3 G/DL (ref 32–36)
MCHC RBC AUTO-ENTMCNC: 33.3 G/DL (ref 32–36)
MCHC RBC AUTO-ENTMCNC: 33.6 G/DL (ref 32–36)
MCHC RBC AUTO-ENTMCNC: 33.7 G/DL (ref 32–36)
MCHC RBC AUTO-ENTMCNC: 33.8 G/DL (ref 32–36)
MCHC RBC AUTO-ENTMCNC: 33.9 G/DL (ref 32–36)
MCHC RBC AUTO-ENTMCNC: 33.9 G/DL (ref 32–36)
MCHC RBC AUTO-ENTMCNC: 34 G/DL (ref 32–36)
MCHC RBC AUTO-ENTMCNC: 34.1 G/DL (ref 32–36)
MCHC RBC AUTO-ENTMCNC: 34.2 G/DL (ref 32–36)
MCHC RBC AUTO-ENTMCNC: 34.3 G/DL (ref 32–36)
MCHC RBC AUTO-ENTMCNC: 34.3 G/DL (ref 32–36)
MCHC RBC AUTO-ENTMCNC: 34.5 G/DL (ref 32–36)
MCHC RBC AUTO-ENTMCNC: 35 G/DL (ref 32–36)
MCHC RBC AUTO-ENTMCNC: 35.2 G/DL (ref 32–36)
MCV RBC AUTO: 87 FL (ref 82–98)
MCV RBC AUTO: 88 FL (ref 82–98)
MCV RBC AUTO: 89 FL (ref 82–98)
MCV RBC AUTO: 89 FL (ref 82–98)
MCV RBC AUTO: 90 FL (ref 82–98)
MCV RBC AUTO: 91 FL (ref 82–98)
MCV RBC AUTO: 92 FL (ref 82–98)
MCV RBC AUTO: 92 FL (ref 82–98)
MCV RBC AUTO: 93 FL (ref 82–98)
MESOTHL CELL NFR FLD MANUAL: 9 %
MICROSCOPIC COMMENT: ABNORMAL
MICROSCOPIC COMMENT: ABNORMAL
MODE: ABNORMAL
MODE: ABNORMAL
MONOCYTES # BLD AUTO: 0.1 K/UL (ref 0.3–1)
MONOCYTES # BLD AUTO: 0.1 K/UL (ref 0.3–1)
MONOCYTES # BLD AUTO: 0.2 K/UL (ref 0.3–1)
MONOCYTES # BLD AUTO: 0.2 K/UL (ref 0.3–1)
MONOCYTES # BLD AUTO: 0.3 K/UL (ref 0.3–1)
MONOCYTES # BLD AUTO: 0.3 K/UL (ref 0.3–1)
MONOCYTES # BLD AUTO: 0.4 K/UL (ref 0.3–1)
MONOCYTES # BLD AUTO: 0.4 K/UL (ref 0.3–1)
MONOCYTES # BLD AUTO: 0.5 K/UL (ref 0.3–1)
MONOCYTES # BLD AUTO: 0.8 K/UL (ref 0.3–1)
MONOCYTES # BLD AUTO: 0.8 K/UL (ref 0.3–1)
MONOCYTES # BLD AUTO: 0.9 K/UL (ref 0.3–1)
MONOCYTES # BLD AUTO: 1.1 K/UL (ref 0.3–1)
MONOCYTES # BLD AUTO: 1.1 K/UL (ref 0.3–1)
MONOCYTES # BLD AUTO: 1.2 K/UL (ref 0.3–1)
MONOCYTES # BLD AUTO: 1.3 K/UL (ref 0.3–1)
MONOCYTES # BLD AUTO: 1.4 K/UL (ref 0.3–1)
MONOCYTES NFR BLD: 0.7 % (ref 4–15)
MONOCYTES NFR BLD: 0.9 % (ref 4–15)
MONOCYTES NFR BLD: 0.9 % (ref 4–15)
MONOCYTES NFR BLD: 1.3 % (ref 4–15)
MONOCYTES NFR BLD: 1.8 % (ref 4–15)
MONOCYTES NFR BLD: 10.7 % (ref 4–15)
MONOCYTES NFR BLD: 11.1 % (ref 4–15)
MONOCYTES NFR BLD: 13.1 % (ref 4–15)
MONOCYTES NFR BLD: 14 % (ref 4–15)
MONOCYTES NFR BLD: 14.5 % (ref 4–15)
MONOCYTES NFR BLD: 14.5 % (ref 4–15)
MONOCYTES NFR BLD: 2.2 % (ref 4–15)
MONOCYTES NFR BLD: 3.2 % (ref 4–15)
MONOCYTES NFR BLD: 4.3 % (ref 4–15)
MONOCYTES NFR BLD: 4.6 % (ref 4–15)
MONOCYTES NFR BLD: 5.9 % (ref 4–15)
MONOCYTES NFR BLD: 6.2 % (ref 4–15)
MONOCYTES NFR BLD: 8.6 % (ref 4–15)
MONOCYTES NFR BLD: 9 % (ref 4–15)
MONOS+MACROS NFR FLD MANUAL: 48 %
MV PEAK E VEL: 0.91 M/S
NEUTROPHILS # BLD AUTO: 11.7 K/UL (ref 1.8–7.7)
NEUTROPHILS # BLD AUTO: 11.9 K/UL (ref 1.8–7.7)
NEUTROPHILS # BLD AUTO: 12.1 K/UL (ref 1.8–7.7)
NEUTROPHILS # BLD AUTO: 12.3 K/UL (ref 1.8–7.7)
NEUTROPHILS # BLD AUTO: 12.3 K/UL (ref 1.8–7.7)
NEUTROPHILS # BLD AUTO: 13.9 K/UL (ref 1.8–7.7)
NEUTROPHILS # BLD AUTO: 14.4 K/UL (ref 1.8–7.7)
NEUTROPHILS # BLD AUTO: 15.8 K/UL (ref 1.8–7.7)
NEUTROPHILS # BLD AUTO: 16.4 K/UL (ref 1.8–7.7)
NEUTROPHILS # BLD AUTO: 18.1 K/UL (ref 1.8–7.7)
NEUTROPHILS # BLD AUTO: 5.2 K/UL (ref 1.8–7.7)
NEUTROPHILS # BLD AUTO: 5.3 K/UL (ref 1.8–7.7)
NEUTROPHILS # BLD AUTO: 5.4 K/UL (ref 1.8–7.7)
NEUTROPHILS # BLD AUTO: 5.5 K/UL (ref 1.8–7.7)
NEUTROPHILS # BLD AUTO: 5.8 K/UL (ref 1.8–7.7)
NEUTROPHILS # BLD AUTO: 7.4 K/UL (ref 1.8–7.7)
NEUTROPHILS # BLD AUTO: 9 K/UL (ref 1.8–7.7)
NEUTROPHILS # BLD AUTO: 9.3 K/UL (ref 1.8–7.7)
NEUTROPHILS # BLD AUTO: 9.9 K/UL (ref 1.8–7.7)
NEUTROPHILS NFR BLD: 59.9 % (ref 38–73)
NEUTROPHILS NFR BLD: 64.7 % (ref 38–73)
NEUTROPHILS NFR BLD: 67.9 % (ref 38–73)
NEUTROPHILS NFR BLD: 68.6 % (ref 38–73)
NEUTROPHILS NFR BLD: 74.9 % (ref 38–73)
NEUTROPHILS NFR BLD: 77.9 % (ref 38–73)
NEUTROPHILS NFR BLD: 79.7 % (ref 38–73)
NEUTROPHILS NFR BLD: 79.8 % (ref 38–73)
NEUTROPHILS NFR BLD: 82.8 % (ref 38–73)
NEUTROPHILS NFR BLD: 88.8 % (ref 38–73)
NEUTROPHILS NFR BLD: 89.3 % (ref 38–73)
NEUTROPHILS NFR BLD: 90 % (ref 38–73)
NEUTROPHILS NFR BLD: 90.6 % (ref 38–73)
NEUTROPHILS NFR BLD: 92.6 % (ref 38–73)
NEUTROPHILS NFR BLD: 93.4 % (ref 38–73)
NEUTROPHILS NFR BLD: 93.4 % (ref 38–73)
NEUTROPHILS NFR BLD: 95.1 % (ref 38–73)
NEUTROPHILS NFR BLD: 95.3 % (ref 38–73)
NEUTROPHILS NFR BLD: 97.4 % (ref 38–73)
NEUTROPHILS NFR FLD MANUAL: 37 %
NITRITE UR QL STRIP: NEGATIVE
NRBC BLD-RTO: 0 /100 WBC
NRBC BLD-RTO: 1 /100 WBC
PCO2 BLDA: 32.7 MMHG (ref 35–45)
PCO2 BLDA: 33.8 MMHG (ref 35–45)
PH SMN: 7.4 [PH] (ref 7.35–7.45)
PH SMN: 7.51 [PH] (ref 7.35–7.45)
PH UR STRIP: 6 [PH] (ref 5–8)
PHOSPHATE SERPL-MCNC: 2.2 MG/DL (ref 2.7–4.5)
PHOSPHATE SERPL-MCNC: 2.4 MG/DL (ref 2.7–4.5)
PHOSPHATE SERPL-MCNC: 2.8 MG/DL (ref 2.7–4.5)
PHOSPHATE SERPL-MCNC: 3 MG/DL (ref 2.7–4.5)
PHOSPHATE SERPL-MCNC: 3.3 MG/DL (ref 2.7–4.5)
PHOSPHATE SERPL-MCNC: 3.3 MG/DL (ref 2.7–4.5)
PHOSPHATE SERPL-MCNC: 3.9 MG/DL (ref 2.7–4.5)
PISA TR MAX VEL: 3.05 M/S
PLATELET # BLD AUTO: 135 K/UL (ref 150–450)
PLATELET # BLD AUTO: 151 K/UL (ref 150–450)
PLATELET # BLD AUTO: 167 K/UL (ref 150–450)
PLATELET # BLD AUTO: 193 K/UL (ref 150–450)
PLATELET # BLD AUTO: 209 K/UL (ref 150–450)
PLATELET # BLD AUTO: 209 K/UL (ref 150–450)
PLATELET # BLD AUTO: 228 K/UL (ref 150–450)
PLATELET # BLD AUTO: 235 K/UL (ref 150–450)
PLATELET # BLD AUTO: 254 K/UL (ref 150–450)
PLATELET # BLD AUTO: 279 K/UL (ref 150–450)
PLATELET # BLD AUTO: 282 K/UL (ref 150–450)
PLATELET # BLD AUTO: 305 K/UL (ref 150–450)
PLATELET # BLD AUTO: 337 K/UL (ref 150–450)
PLATELET # BLD AUTO: 345 K/UL (ref 150–450)
PLATELET # BLD AUTO: 357 K/UL (ref 150–450)
PLATELET # BLD AUTO: 358 K/UL (ref 150–450)
PLATELET # BLD AUTO: 395 K/UL (ref 150–450)
PLATELET # BLD AUTO: 463 K/UL (ref 150–450)
PLATELET # BLD AUTO: ABNORMAL K/UL (ref 150–450)
PLATELET BLD QL SMEAR: ABNORMAL
PMV BLD AUTO: 10 FL (ref 9.2–12.9)
PMV BLD AUTO: 10.1 FL (ref 9.2–12.9)
PMV BLD AUTO: 10.2 FL (ref 9.2–12.9)
PMV BLD AUTO: 10.3 FL (ref 9.2–12.9)
PMV BLD AUTO: 10.7 FL (ref 9.2–12.9)
PMV BLD AUTO: 11.4 FL (ref 9.2–12.9)
PMV BLD AUTO: 9.4 FL (ref 9.2–12.9)
PMV BLD AUTO: 9.7 FL (ref 9.2–12.9)
PMV BLD AUTO: 9.8 FL (ref 9.2–12.9)
PMV BLD AUTO: 9.8 FL (ref 9.2–12.9)
PMV BLD AUTO: 9.9 FL (ref 9.2–12.9)
PMV BLD AUTO: ABNORMAL FL (ref 9.2–12.9)
PO2 BLDA: 124 MMHG (ref 80–100)
PO2 BLDA: 60 MMHG (ref 80–100)
POC BE: -4 MMOL/L
POC BE: 4 MMOL/L
POC MOLECULAR INFLUENZA A AGN: NEGATIVE
POC MOLECULAR INFLUENZA B AGN: NEGATIVE
POC SATURATED O2: 91 % (ref 95–100)
POC SATURATED O2: 99 % (ref 95–100)
POC TCO2: 21 MMOL/L (ref 23–27)
POC TCO2: 28 MMOL/L (ref 23–27)
POCT GLUCOSE: 111 MG/DL (ref 70–110)
POTASSIUM SERPL-SCNC: 2.8 MMOL/L (ref 3.5–5.1)
POTASSIUM SERPL-SCNC: 2.9 MMOL/L (ref 3.5–5.1)
POTASSIUM SERPL-SCNC: 3 MMOL/L (ref 3.5–5.1)
POTASSIUM SERPL-SCNC: 3.1 MMOL/L (ref 3.5–5.1)
POTASSIUM SERPL-SCNC: 3.2 MMOL/L (ref 3.5–5.1)
POTASSIUM SERPL-SCNC: 3.4 MMOL/L (ref 3.5–5.1)
POTASSIUM SERPL-SCNC: 3.4 MMOL/L (ref 3.5–5.1)
POTASSIUM SERPL-SCNC: 3.5 MMOL/L (ref 3.5–5.1)
POTASSIUM SERPL-SCNC: 3.5 MMOL/L (ref 3.5–5.1)
POTASSIUM SERPL-SCNC: 3.6 MMOL/L (ref 3.5–5.1)
POTASSIUM SERPL-SCNC: 3.6 MMOL/L (ref 3.5–5.1)
POTASSIUM SERPL-SCNC: 3.7 MMOL/L (ref 3.5–5.1)
POTASSIUM SERPL-SCNC: 3.8 MMOL/L (ref 3.5–5.1)
POTASSIUM SERPL-SCNC: 3.9 MMOL/L (ref 3.5–5.1)
POTASSIUM SERPL-SCNC: 4 MMOL/L (ref 3.5–5.1)
POTASSIUM SERPL-SCNC: 4.2 MMOL/L (ref 3.5–5.1)
POTASSIUM SERPL-SCNC: 4.2 MMOL/L (ref 3.5–5.1)
POTASSIUM SERPL-SCNC: 4.6 MMOL/L (ref 3.5–5.1)
PROCALCITONIN SERPL IA-MCNC: 0.05 NG/ML (ref 0–0.5)
PROCALCITONIN SERPL IA-MCNC: 0.15 NG/ML
PROCALCITONIN SERPL IA-MCNC: 1.32 NG/ML
PROT FLD-MCNC: 2.8 G/DL
PROT SERPL-MCNC: 5.4 G/DL (ref 6–8.4)
PROT SERPL-MCNC: 5.8 G/DL (ref 6–8.4)
PROT SERPL-MCNC: 6.2 G/DL (ref 6–8.4)
PROT SERPL-MCNC: 6.3 G/DL (ref 6–8.4)
PROT SERPL-MCNC: 6.4 G/DL (ref 6–8.4)
PROT SERPL-MCNC: 6.7 G/DL (ref 6–8.4)
PROT SERPL-MCNC: 6.8 G/DL (ref 6–8.4)
PROT SERPL-MCNC: 6.9 G/DL (ref 6–8.4)
PROT SERPL-MCNC: 6.9 G/DL (ref 6–8.4)
PROT SERPL-MCNC: 7.2 G/DL (ref 6–8.4)
PROT SERPL-MCNC: 7.2 G/DL (ref 6–8.4)
PROT SERPL-MCNC: 7.5 G/DL (ref 6–8.4)
PROT UR QL STRIP: ABNORMAL
PROT UR QL STRIP: NEGATIVE
PROT UR QL STRIP: NEGATIVE
PV PEAK VELOCITY: 1.29 CM/S
RA MAJOR: 5.98 CM
RA PRESSURE: 3 MMHG
RA WIDTH: 5.17 CM
RBC # BLD AUTO: 3.3 M/UL (ref 4.6–6.2)
RBC # BLD AUTO: 3.54 M/UL (ref 4.6–6.2)
RBC # BLD AUTO: 3.6 M/UL (ref 4.6–6.2)
RBC # BLD AUTO: 3.72 M/UL (ref 4.6–6.2)
RBC # BLD AUTO: 3.73 M/UL (ref 4.6–6.2)
RBC # BLD AUTO: 3.77 M/UL (ref 4.6–6.2)
RBC # BLD AUTO: 3.79 M/UL (ref 4.6–6.2)
RBC # BLD AUTO: 3.82 M/UL (ref 4.6–6.2)
RBC # BLD AUTO: 3.9 M/UL (ref 4.6–6.2)
RBC # BLD AUTO: 4.02 M/UL (ref 4.6–6.2)
RBC # BLD AUTO: 4.04 M/UL (ref 4.6–6.2)
RBC # BLD AUTO: 4.14 M/UL (ref 4.6–6.2)
RBC # BLD AUTO: 4.18 M/UL (ref 4.6–6.2)
RBC # BLD AUTO: 4.18 M/UL (ref 4.6–6.2)
RBC # BLD AUTO: 4.21 M/UL (ref 4.6–6.2)
RBC # BLD AUTO: 4.24 M/UL (ref 4.6–6.2)
RBC # BLD AUTO: 4.5 M/UL (ref 4.6–6.2)
RBC # BLD AUTO: 4.79 M/UL (ref 4.6–6.2)
RBC # BLD AUTO: 4.99 M/UL (ref 4.6–6.2)
RBC #/AREA URNS HPF: 10 /HPF (ref 0–4)
RBC #/AREA URNS HPF: 3 /HPF (ref 0–4)
RIGHT VENTRICULAR END-DIASTOLIC DIMENSION: 4.37 CM
RPR SER QL: NORMAL
SAMPLE: ABNORMAL
SAMPLE: ABNORMAL
SARS-COV-2 RDRP RESP QL NAA+PROBE: NEGATIVE
SINUS: 3.35 CM
SITE: ABNORMAL
SITE: ABNORMAL
SODIUM SERPL-SCNC: 132 MMOL/L (ref 136–145)
SODIUM SERPL-SCNC: 134 MMOL/L (ref 136–145)
SODIUM SERPL-SCNC: 136 MMOL/L (ref 136–145)
SODIUM SERPL-SCNC: 138 MMOL/L (ref 136–145)
SODIUM SERPL-SCNC: 139 MMOL/L (ref 136–145)
SODIUM SERPL-SCNC: 140 MMOL/L (ref 136–145)
SODIUM SERPL-SCNC: 142 MMOL/L (ref 136–145)
SODIUM SERPL-SCNC: 143 MMOL/L (ref 136–145)
SP GR UR STRIP: 1.01 (ref 1–1.03)
SP GR UR STRIP: 1.01 (ref 1–1.03)
SP GR UR STRIP: 1.03 (ref 1–1.03)
SP02: 99
SPECIMEN SOURCE: NORMAL
SQUAMOUS #/AREA URNS HPF: 0 /HPF
STJ: 2.95 CM
TDI LATERAL: 0.13 M/S
TDI SEPTAL: 0.1 M/S
TDI: 0.12 M/S
TR MAX PG: 37 MMHG
TRICUSPID ANNULAR PLANE SYSTOLIC EXCURSION: 1.77 CM
TROPONIN I SERPL DL<=0.01 NG/ML-MCNC: 0.01 NG/ML (ref 0–0.03)
TROPONIN I SERPL DL<=0.01 NG/ML-MCNC: 0.02 NG/ML (ref 0–0.03)
TROPONIN I SERPL DL<=0.01 NG/ML-MCNC: 0.03 NG/ML
TROPONIN I SERPL DL<=0.01 NG/ML-MCNC: 0.07 NG/ML (ref 0–0.03)
TROPONIN I SERPL DL<=0.01 NG/ML-MCNC: 0.09 NG/ML (ref 0–0.03)
TSH SERPL DL<=0.005 MIU/L-ACNC: 0.81 UIU/ML (ref 0.4–4)
TV REST PULMONARY ARTERY PRESSURE: 40 MMHG
URN SPEC COLLECT METH UR: ABNORMAL
URN SPEC COLLECT METH UR: ABNORMAL
URN SPEC COLLECT METH UR: NORMAL
UROBILINOGEN UR STRIP-ACNC: NEGATIVE EU/DL
VANCOMYCIN SERPL-MCNC: 10.7 UG/ML
VANCOMYCIN SERPL-MCNC: 13.2 UG/ML
VANCOMYCIN SERPL-MCNC: 20.5 UG/ML
VANCOMYCIN SERPL-MCNC: 9.9 UG/ML
VANCOMYCIN TROUGH SERPL-MCNC: 19.4 UG/ML (ref 10–22)
VANCOMYCIN TROUGH SERPL-MCNC: 20.6 UG/ML (ref 10–22)
VANCOMYCIN TROUGH SERPL-MCNC: 22.1 UG/ML (ref 10–22)
VANCOMYCIN TROUGH SERPL-MCNC: 22.8 UG/ML (ref 10–22)
VANCOMYCIN TROUGH SERPL-MCNC: 26.4 UG/ML (ref 10–22)
VIT B12 SERPL-MCNC: 334 PG/ML (ref 210–950)
WBC # BLD AUTO: 11.11 K/UL (ref 3.9–12.7)
WBC # BLD AUTO: 11.54 K/UL (ref 3.9–12.7)
WBC # BLD AUTO: 12.22 K/UL (ref 3.9–12.7)
WBC # BLD AUTO: 13.06 K/UL (ref 3.9–12.7)
WBC # BLD AUTO: 13.6 K/UL (ref 3.9–12.7)
WBC # BLD AUTO: 14.88 K/UL (ref 3.9–12.7)
WBC # BLD AUTO: 14.98 K/UL (ref 3.9–12.7)
WBC # BLD AUTO: 15.16 K/UL (ref 3.9–12.7)
WBC # BLD AUTO: 15.19 K/UL (ref 3.9–12.7)
WBC # BLD AUTO: 16.93 K/UL (ref 3.9–12.7)
WBC # BLD AUTO: 18.24 K/UL (ref 3.9–12.7)
WBC # BLD AUTO: 18.95 K/UL (ref 3.9–12.7)
WBC # BLD AUTO: 6.94 K/UL (ref 3.9–12.7)
WBC # BLD AUTO: 7.94 K/UL (ref 3.9–12.7)
WBC # BLD AUTO: 8.23 K/UL (ref 3.9–12.7)
WBC # BLD AUTO: 8.47 K/UL (ref 3.9–12.7)
WBC # BLD AUTO: 8.63 K/UL (ref 3.9–12.7)
WBC # BLD AUTO: 9.92 K/UL (ref 3.9–12.7)
WBC # BLD AUTO: 9.95 K/UL (ref 3.9–12.7)
WBC # FLD: 1674 /CU MM
WBC #/AREA URNS HPF: 0 /HPF (ref 0–5)
WBC #/AREA URNS HPF: 2 /HPF (ref 0–5)

## 2022-01-01 PROCEDURE — 99214 OFFICE O/P EST MOD 30 MIN: CPT | Mod: S$PBB,,, | Performed by: NURSE PRACTITIONER

## 2022-01-01 PROCEDURE — 84484 ASSAY OF TROPONIN QUANT: CPT | Performed by: EMERGENCY MEDICINE

## 2022-01-01 PROCEDURE — 36415 COLL VENOUS BLD VENIPUNCTURE: CPT | Performed by: STUDENT IN AN ORGANIZED HEALTH CARE EDUCATION/TRAINING PROGRAM

## 2022-01-01 PROCEDURE — 85025 COMPLETE CBC W/AUTO DIFF WBC: CPT | Performed by: STUDENT IN AN ORGANIZED HEALTH CARE EDUCATION/TRAINING PROGRAM

## 2022-01-01 PROCEDURE — 11000001 HC ACUTE MED/SURG PRIVATE ROOM

## 2022-01-01 PROCEDURE — 63600175 PHARM REV CODE 636 W HCPCS: Performed by: STUDENT IN AN ORGANIZED HEALTH CARE EDUCATION/TRAINING PROGRAM

## 2022-01-01 PROCEDURE — 27000221 HC OXYGEN, UP TO 24 HOURS

## 2022-01-01 PROCEDURE — 25000003 PHARM REV CODE 250: Performed by: STUDENT IN AN ORGANIZED HEALTH CARE EDUCATION/TRAINING PROGRAM

## 2022-01-01 PROCEDURE — 25000003 PHARM REV CODE 250: Performed by: HOSPITALIST

## 2022-01-01 PROCEDURE — 94640 AIRWAY INHALATION TREATMENT: CPT

## 2022-01-01 PROCEDURE — 86140 C-REACTIVE PROTEIN: CPT | Performed by: STUDENT IN AN ORGANIZED HEALTH CARE EDUCATION/TRAINING PROGRAM

## 2022-01-01 PROCEDURE — 80053 COMPREHEN METABOLIC PANEL: CPT | Performed by: HOSPITALIST

## 2022-01-01 PROCEDURE — 25000242 PHARM REV CODE 250 ALT 637 W/ HCPCS: Performed by: STUDENT IN AN ORGANIZED HEALTH CARE EDUCATION/TRAINING PROGRAM

## 2022-01-01 PROCEDURE — 83605 ASSAY OF LACTIC ACID: CPT | Mod: 91 | Performed by: EMERGENCY MEDICINE

## 2022-01-01 PROCEDURE — 94761 N-INVAS EAR/PLS OXIMETRY MLT: CPT | Mod: XB

## 2022-01-01 PROCEDURE — 94761 N-INVAS EAR/PLS OXIMETRY MLT: CPT

## 2022-01-01 PROCEDURE — 96374 THER/PROPH/DIAG INJ IV PUSH: CPT

## 2022-01-01 PROCEDURE — 99900031 HC PATIENT EDUCATION (STAT)

## 2022-01-01 PROCEDURE — 99233 SBSQ HOSP IP/OBS HIGH 50: CPT | Mod: ,,, | Performed by: INTERNAL MEDICINE

## 2022-01-01 PROCEDURE — G0378 HOSPITAL OBSERVATION PER HR: HCPCS

## 2022-01-01 PROCEDURE — 80202 ASSAY OF VANCOMYCIN: CPT | Performed by: INTERNAL MEDICINE

## 2022-01-01 PROCEDURE — 93010 ELECTROCARDIOGRAM REPORT: CPT | Mod: 76,,, | Performed by: INTERNAL MEDICINE

## 2022-01-01 PROCEDURE — 73030 XR SHOULDER TRAUMA 3 VIEW LEFT: ICD-10-PCS | Mod: 26,LT,, | Performed by: INTERNAL MEDICINE

## 2022-01-01 PROCEDURE — 96376 TX/PRO/DX INJ SAME DRUG ADON: CPT

## 2022-01-01 PROCEDURE — 99215 PR OFFICE/OUTPT VISIT, EST, LEVL V, 40-54 MIN: ICD-10-PCS | Mod: S$PBB,,, | Performed by: STUDENT IN AN ORGANIZED HEALTH CARE EDUCATION/TRAINING PROGRAM

## 2022-01-01 PROCEDURE — 93010 EKG 12-LEAD: ICD-10-PCS | Mod: ,,, | Performed by: INTERNAL MEDICINE

## 2022-01-01 PROCEDURE — 99215 OFFICE O/P EST HI 40 MIN: CPT | Mod: S$PBB,,, | Performed by: STUDENT IN AN ORGANIZED HEALTH CARE EDUCATION/TRAINING PROGRAM

## 2022-01-01 PROCEDURE — 83605 ASSAY OF LACTIC ACID: CPT | Performed by: STUDENT IN AN ORGANIZED HEALTH CARE EDUCATION/TRAINING PROGRAM

## 2022-01-01 PROCEDURE — 97116 GAIT TRAINING THERAPY: CPT | Mod: CQ

## 2022-01-01 PROCEDURE — 94664 DEMO&/EVAL PT USE INHALER: CPT

## 2022-01-01 PROCEDURE — 21400001 HC TELEMETRY ROOM

## 2022-01-01 PROCEDURE — U0002 COVID-19 LAB TEST NON-CDC: HCPCS | Performed by: NURSE PRACTITIONER

## 2022-01-01 PROCEDURE — 99223 PR INITIAL HOSPITAL CARE,LEVL III: ICD-10-PCS | Mod: ,,, | Performed by: NURSE PRACTITIONER

## 2022-01-01 PROCEDURE — 83605 ASSAY OF LACTIC ACID: CPT | Performed by: EMERGENCY MEDICINE

## 2022-01-01 PROCEDURE — 96375 TX/PRO/DX INJ NEW DRUG ADDON: CPT

## 2022-01-01 PROCEDURE — 63600175 PHARM REV CODE 636 W HCPCS: Performed by: EMERGENCY MEDICINE

## 2022-01-01 PROCEDURE — 80053 COMPREHEN METABOLIC PANEL: CPT | Performed by: EMERGENCY MEDICINE

## 2022-01-01 PROCEDURE — 36410 VNPNXR 3YR/> PHY/QHP DX/THER: CPT

## 2022-01-01 PROCEDURE — 99223 1ST HOSP IP/OBS HIGH 75: CPT | Mod: ,,, | Performed by: NURSE PRACTITIONER

## 2022-01-01 PROCEDURE — 82140 ASSAY OF AMMONIA: CPT | Performed by: HOSPITALIST

## 2022-01-01 PROCEDURE — 97535 SELF CARE MNGMENT TRAINING: CPT | Mod: CO

## 2022-01-01 PROCEDURE — 99999 PR PBB SHADOW E&M-EST. PATIENT-LVL IV: CPT | Mod: PBBFAC,,, | Performed by: NURSE PRACTITIONER

## 2022-01-01 PROCEDURE — 82945 GLUCOSE OTHER FLUID: CPT | Performed by: STUDENT IN AN ORGANIZED HEALTH CARE EDUCATION/TRAINING PROGRAM

## 2022-01-01 PROCEDURE — 99900035 HC TECH TIME PER 15 MIN (STAT)

## 2022-01-01 PROCEDURE — 80202 ASSAY OF VANCOMYCIN: CPT | Performed by: STUDENT IN AN ORGANIZED HEALTH CARE EDUCATION/TRAINING PROGRAM

## 2022-01-01 PROCEDURE — 25000242 PHARM REV CODE 250 ALT 637 W/ HCPCS: Performed by: HOSPITALIST

## 2022-01-01 PROCEDURE — 36415 COLL VENOUS BLD VENIPUNCTURE: CPT | Performed by: NURSE PRACTITIONER

## 2022-01-01 PROCEDURE — 80048 BASIC METABOLIC PNL TOTAL CA: CPT | Performed by: STUDENT IN AN ORGANIZED HEALTH CARE EDUCATION/TRAINING PROGRAM

## 2022-01-01 PROCEDURE — 63600175 PHARM REV CODE 636 W HCPCS: Performed by: HOSPITALIST

## 2022-01-01 PROCEDURE — 94799 UNLISTED PULMONARY SVC/PX: CPT

## 2022-01-01 PROCEDURE — 87040 BLOOD CULTURE FOR BACTERIA: CPT | Mod: 59 | Performed by: STUDENT IN AN ORGANIZED HEALTH CARE EDUCATION/TRAINING PROGRAM

## 2022-01-01 PROCEDURE — 85025 COMPLETE CBC W/AUTO DIFF WBC: CPT | Performed by: EMERGENCY MEDICINE

## 2022-01-01 PROCEDURE — 99291 CRITICAL CARE FIRST HOUR: CPT | Mod: 25

## 2022-01-01 PROCEDURE — 25000003 PHARM REV CODE 250: Performed by: INTERNAL MEDICINE

## 2022-01-01 PROCEDURE — 96365 THER/PROPH/DIAG IV INF INIT: CPT

## 2022-01-01 PROCEDURE — 36415 COLL VENOUS BLD VENIPUNCTURE: CPT | Performed by: INTERNAL MEDICINE

## 2022-01-01 PROCEDURE — 83735 ASSAY OF MAGNESIUM: CPT | Performed by: STUDENT IN AN ORGANIZED HEALTH CARE EDUCATION/TRAINING PROGRAM

## 2022-01-01 PROCEDURE — 99232 PR SUBSEQUENT HOSPITAL CARE,LEVL II: ICD-10-PCS | Mod: ,,, | Performed by: NURSE PRACTITIONER

## 2022-01-01 PROCEDURE — 97110 THERAPEUTIC EXERCISES: CPT

## 2022-01-01 PROCEDURE — 87075 CULTR BACTERIA EXCEPT BLOOD: CPT | Performed by: STUDENT IN AN ORGANIZED HEALTH CARE EDUCATION/TRAINING PROGRAM

## 2022-01-01 PROCEDURE — 63600175 PHARM REV CODE 636 W HCPCS: Performed by: INTERNAL MEDICINE

## 2022-01-01 PROCEDURE — 85025 COMPLETE CBC W/AUTO DIFF WBC: CPT | Performed by: HOSPITALIST

## 2022-01-01 PROCEDURE — 99223 PR INITIAL HOSPITAL CARE,LEVL III: ICD-10-PCS | Mod: ,,, | Performed by: STUDENT IN AN ORGANIZED HEALTH CARE EDUCATION/TRAINING PROGRAM

## 2022-01-01 PROCEDURE — 27000646 HC AEROBIKA DEVICE

## 2022-01-01 PROCEDURE — 94760 N-INVAS EAR/PLS OXIMETRY 1: CPT

## 2022-01-01 PROCEDURE — 89051 BODY FLUID CELL COUNT: CPT | Performed by: STUDENT IN AN ORGANIZED HEALTH CARE EDUCATION/TRAINING PROGRAM

## 2022-01-01 PROCEDURE — 80053 COMPREHEN METABOLIC PANEL: CPT | Performed by: STUDENT IN AN ORGANIZED HEALTH CARE EDUCATION/TRAINING PROGRAM

## 2022-01-01 PROCEDURE — 84100 ASSAY OF PHOSPHORUS: CPT | Performed by: STUDENT IN AN ORGANIZED HEALTH CARE EDUCATION/TRAINING PROGRAM

## 2022-01-01 PROCEDURE — 99223 PR INITIAL HOSPITAL CARE,LEVL III: ICD-10-PCS | Mod: 25,,, | Performed by: INTERNAL MEDICINE

## 2022-01-01 PROCEDURE — 84145 PROCALCITONIN (PCT): CPT | Performed by: STUDENT IN AN ORGANIZED HEALTH CARE EDUCATION/TRAINING PROGRAM

## 2022-01-01 PROCEDURE — 82803 BLOOD GASES ANY COMBINATION: CPT

## 2022-01-01 PROCEDURE — 99999 PR PBB SHADOW E&M-EST. PATIENT-LVL IV: ICD-10-PCS | Mod: PBBFAC,,, | Performed by: STUDENT IN AN ORGANIZED HEALTH CARE EDUCATION/TRAINING PROGRAM

## 2022-01-01 PROCEDURE — 27100171 HC OXYGEN HIGH FLOW UP TO 24 HOURS

## 2022-01-01 PROCEDURE — G0378 HOSPITAL OBSERVATION PER HR: HCPCS | Mod: CS

## 2022-01-01 PROCEDURE — 93010 ELECTROCARDIOGRAM REPORT: CPT | Mod: ,,, | Performed by: INTERNAL MEDICINE

## 2022-01-01 PROCEDURE — 99223 PR INITIAL HOSPITAL CARE,LEVL III: ICD-10-PCS | Mod: ,,, | Performed by: REGISTERED NURSE

## 2022-01-01 PROCEDURE — 76937 US GUIDE VASCULAR ACCESS: CPT

## 2022-01-01 PROCEDURE — 99999 PR PBB SHADOW E&M-EST. PATIENT-LVL IV: CPT | Mod: PBBFAC,,, | Performed by: STUDENT IN AN ORGANIZED HEALTH CARE EDUCATION/TRAINING PROGRAM

## 2022-01-01 PROCEDURE — 63600175 PHARM REV CODE 636 W HCPCS: Performed by: NURSE PRACTITIONER

## 2022-01-01 PROCEDURE — 96361 HYDRATE IV INFUSION ADD-ON: CPT

## 2022-01-01 PROCEDURE — 99999 PR PBB SHADOW E&M-EST. PATIENT-LVL IV: ICD-10-PCS | Mod: PBBFAC,,, | Performed by: NURSE PRACTITIONER

## 2022-01-01 PROCEDURE — 83880 ASSAY OF NATRIURETIC PEPTIDE: CPT | Performed by: EMERGENCY MEDICINE

## 2022-01-01 PROCEDURE — 36415 COLL VENOUS BLD VENIPUNCTURE: CPT | Performed by: HOSPITALIST

## 2022-01-01 PROCEDURE — 97535 SELF CARE MNGMENT TRAINING: CPT

## 2022-01-01 PROCEDURE — 93005 ELECTROCARDIOGRAM TRACING: CPT | Performed by: INTERNAL MEDICINE

## 2022-01-01 PROCEDURE — 25000003 PHARM REV CODE 250: Performed by: NURSE PRACTITIONER

## 2022-01-01 PROCEDURE — 96413 CHEMO IV INFUSION 1 HR: CPT

## 2022-01-01 PROCEDURE — 94618 PULMONARY STRESS TESTING: CPT

## 2022-01-01 PROCEDURE — 25000242 PHARM REV CODE 250 ALT 637 W/ HCPCS: Performed by: EMERGENCY MEDICINE

## 2022-01-01 PROCEDURE — 71260 CT THORAX DX C+: CPT | Mod: 26,,, | Performed by: RADIOLOGY

## 2022-01-01 PROCEDURE — 74177 CT ABD & PELVIS W/CONTRAST: CPT | Mod: TC

## 2022-01-01 PROCEDURE — 80202 ASSAY OF VANCOMYCIN: CPT | Mod: 91 | Performed by: STUDENT IN AN ORGANIZED HEALTH CARE EDUCATION/TRAINING PROGRAM

## 2022-01-01 PROCEDURE — 25500020 PHARM REV CODE 255: Performed by: STUDENT IN AN ORGANIZED HEALTH CARE EDUCATION/TRAINING PROGRAM

## 2022-01-01 PROCEDURE — 97530 THERAPEUTIC ACTIVITIES: CPT

## 2022-01-01 PROCEDURE — A4216 STERILE WATER/SALINE, 10 ML: HCPCS | Performed by: PHYSICIAN ASSISTANT

## 2022-01-01 PROCEDURE — 80048 BASIC METABOLIC PNL TOTAL CA: CPT | Performed by: INTERNAL MEDICINE

## 2022-01-01 PROCEDURE — 25000003 PHARM REV CODE 250: Performed by: PHYSICIAN ASSISTANT

## 2022-01-01 PROCEDURE — A4216 STERILE WATER/SALINE, 10 ML: HCPCS | Performed by: STUDENT IN AN ORGANIZED HEALTH CARE EDUCATION/TRAINING PROGRAM

## 2022-01-01 PROCEDURE — 99223 1ST HOSP IP/OBS HIGH 75: CPT | Mod: ,,, | Performed by: INTERNAL MEDICINE

## 2022-01-01 PROCEDURE — 99999 PR PBB SHADOW E&M-EST. PATIENT-LVL IV: CPT | Mod: PBBFAC,,, | Performed by: INTERNAL MEDICINE

## 2022-01-01 PROCEDURE — 25000242 PHARM REV CODE 250 ALT 637 W/ HCPCS: Performed by: NURSE PRACTITIONER

## 2022-01-01 PROCEDURE — 99223 1ST HOSP IP/OBS HIGH 75: CPT | Mod: ,,, | Performed by: REGISTERED NURSE

## 2022-01-01 PROCEDURE — 99211 OFF/OP EST MAY X REQ PHY/QHP: CPT | Mod: PBBFAC | Performed by: STUDENT IN AN ORGANIZED HEALTH CARE EDUCATION/TRAINING PROGRAM

## 2022-01-01 PROCEDURE — 99999 PR PBB SHADOW E&M-EST. PATIENT-LVL IV: ICD-10-PCS | Mod: PBBFAC,,, | Performed by: INTERNAL MEDICINE

## 2022-01-01 PROCEDURE — 96366 THER/PROPH/DIAG IV INF ADDON: CPT

## 2022-01-01 PROCEDURE — 83735 ASSAY OF MAGNESIUM: CPT | Performed by: EMERGENCY MEDICINE

## 2022-01-01 PROCEDURE — 99205 OFFICE O/P NEW HI 60 MIN: CPT | Mod: ,,, | Performed by: INTERNAL MEDICINE

## 2022-01-01 PROCEDURE — 99214 OFFICE O/P EST MOD 30 MIN: CPT | Mod: PBBFAC,PO | Performed by: NURSE PRACTITIONER

## 2022-01-01 PROCEDURE — 96372 THER/PROPH/DIAG INJ SC/IM: CPT | Performed by: NURSE PRACTITIONER

## 2022-01-01 PROCEDURE — 74177 CT CHEST ABDOMEN PELVIS WITH CONTRAST (XPD): ICD-10-PCS | Mod: 26,,, | Performed by: RADIOLOGY

## 2022-01-01 PROCEDURE — 99214 OFFICE O/P EST MOD 30 MIN: CPT | Mod: PBBFAC | Performed by: STUDENT IN AN ORGANIZED HEALTH CARE EDUCATION/TRAINING PROGRAM

## 2022-01-01 PROCEDURE — C1751 CATH, INF, PER/CENT/MIDLINE: HCPCS

## 2022-01-01 PROCEDURE — 25000003 PHARM REV CODE 250: Performed by: EMERGENCY MEDICINE

## 2022-01-01 PROCEDURE — 87040 BLOOD CULTURE FOR BACTERIA: CPT | Mod: 59 | Performed by: EMERGENCY MEDICINE

## 2022-01-01 PROCEDURE — 36600 WITHDRAWAL OF ARTERIAL BLOOD: CPT

## 2022-01-01 PROCEDURE — 87635 SARS-COV-2 COVID-19 AMP PRB: CPT | Performed by: STUDENT IN AN ORGANIZED HEALTH CARE EDUCATION/TRAINING PROGRAM

## 2022-01-01 PROCEDURE — 87205 SMEAR GRAM STAIN: CPT | Performed by: STUDENT IN AN ORGANIZED HEALTH CARE EDUCATION/TRAINING PROGRAM

## 2022-01-01 PROCEDURE — 97161 PT EVAL LOW COMPLEX 20 MIN: CPT

## 2022-01-01 PROCEDURE — 99214 PR OFFICE/OUTPT VISIT, EST, LEVL IV, 30-39 MIN: ICD-10-PCS | Mod: S$PBB,,, | Performed by: NURSE PRACTITIONER

## 2022-01-01 PROCEDURE — 99232 SBSQ HOSP IP/OBS MODERATE 35: CPT | Mod: ,,, | Performed by: NURSE PRACTITIONER

## 2022-01-01 PROCEDURE — 84157 ASSAY OF PROTEIN OTHER: CPT | Performed by: STUDENT IN AN ORGANIZED HEALTH CARE EDUCATION/TRAINING PROGRAM

## 2022-01-01 PROCEDURE — 97110 THERAPEUTIC EXERCISES: CPT | Mod: CQ

## 2022-01-01 PROCEDURE — 84145 PROCALCITONIN (PCT): CPT | Performed by: EMERGENCY MEDICINE

## 2022-01-01 PROCEDURE — 83880 ASSAY OF NATRIURETIC PEPTIDE: CPT | Performed by: STUDENT IN AN ORGANIZED HEALTH CARE EDUCATION/TRAINING PROGRAM

## 2022-01-01 PROCEDURE — 99233 PR SUBSEQUENT HOSPITAL CARE,LEVL III: ICD-10-PCS | Mod: ,,, | Performed by: STUDENT IN AN ORGANIZED HEALTH CARE EDUCATION/TRAINING PROGRAM

## 2022-01-01 PROCEDURE — 63600175 PHARM REV CODE 636 W HCPCS: Performed by: PHYSICIAN ASSISTANT

## 2022-01-01 PROCEDURE — 99285 EMERGENCY DEPT VISIT HI MDM: CPT | Mod: 25

## 2022-01-01 PROCEDURE — 99497 PR ADVNCD CARE PLAN 30 MIN: ICD-10-PCS | Mod: ,,, | Performed by: REGISTERED NURSE

## 2022-01-01 PROCEDURE — 99497 PR ADVNCD CARE PLAN 30 MIN: ICD-10-PCS | Mod: 25,,, | Performed by: NURSE PRACTITIONER

## 2022-01-01 PROCEDURE — 93010 EKG 12-LEAD: ICD-10-PCS | Mod: 76,,, | Performed by: INTERNAL MEDICINE

## 2022-01-01 PROCEDURE — 82607 VITAMIN B-12: CPT | Performed by: HOSPITALIST

## 2022-01-01 PROCEDURE — 80202 ASSAY OF VANCOMYCIN: CPT | Performed by: HOSPITALIST

## 2022-01-01 PROCEDURE — 87205 SMEAR GRAM STAIN: CPT | Performed by: INTERNAL MEDICINE

## 2022-01-01 PROCEDURE — 87040 BLOOD CULTURE FOR BACTERIA: CPT | Performed by: EMERGENCY MEDICINE

## 2022-01-01 PROCEDURE — 84443 ASSAY THYROID STIM HORMONE: CPT | Performed by: HOSPITALIST

## 2022-01-01 PROCEDURE — 99214 OFFICE O/P EST MOD 30 MIN: CPT | Mod: PBBFAC | Performed by: INTERNAL MEDICINE

## 2022-01-01 PROCEDURE — 99233 SBSQ HOSP IP/OBS HIGH 50: CPT | Mod: ,,, | Performed by: REGISTERED NURSE

## 2022-01-01 PROCEDURE — 97116 GAIT TRAINING THERAPY: CPT

## 2022-01-01 PROCEDURE — 71260 CT CHEST ABDOMEN PELVIS WITH CONTRAST (XPD): ICD-10-PCS | Mod: 26,,, | Performed by: RADIOLOGY

## 2022-01-01 PROCEDURE — 99497 ADVNCD CARE PLAN 30 MIN: CPT | Mod: 25,,, | Performed by: NURSE PRACTITIONER

## 2022-01-01 PROCEDURE — 93005 ELECTROCARDIOGRAM TRACING: CPT

## 2022-01-01 PROCEDURE — 99999 PR PBB SHADOW E&M-EST. PATIENT-LVL III: ICD-10-PCS | Mod: PBBFAC,,, | Performed by: STUDENT IN AN ORGANIZED HEALTH CARE EDUCATION/TRAINING PROGRAM

## 2022-01-01 PROCEDURE — U0002 COVID-19 LAB TEST NON-CDC: HCPCS | Performed by: EMERGENCY MEDICINE

## 2022-01-01 PROCEDURE — 96372 THER/PROPH/DIAG INJ SC/IM: CPT | Mod: 59 | Performed by: NURSE PRACTITIONER

## 2022-01-01 PROCEDURE — 87070 CULTURE OTHR SPECIMN AEROBIC: CPT | Performed by: STUDENT IN AN ORGANIZED HEALTH CARE EDUCATION/TRAINING PROGRAM

## 2022-01-01 PROCEDURE — 99223 PR INITIAL HOSPITAL CARE,LEVL III: ICD-10-PCS | Mod: ,,, | Performed by: INTERNAL MEDICINE

## 2022-01-01 PROCEDURE — 97166 OT EVAL MOD COMPLEX 45 MIN: CPT

## 2022-01-01 PROCEDURE — 74177 CT ABD & PELVIS W/CONTRAST: CPT | Mod: 26,,, | Performed by: RADIOLOGY

## 2022-01-01 PROCEDURE — 82553 CREATINE MB FRACTION: CPT | Performed by: EMERGENCY MEDICINE

## 2022-01-01 PROCEDURE — 85379 FIBRIN DEGRADATION QUANT: CPT | Performed by: STUDENT IN AN ORGANIZED HEALTH CARE EDUCATION/TRAINING PROGRAM

## 2022-01-01 PROCEDURE — 99213 OFFICE O/P EST LOW 20 MIN: CPT | Mod: PBBFAC | Performed by: STUDENT IN AN ORGANIZED HEALTH CARE EDUCATION/TRAINING PROGRAM

## 2022-01-01 PROCEDURE — 82042 OTHER SOURCE ALBUMIN QUAN EA: CPT | Performed by: STUDENT IN AN ORGANIZED HEALTH CARE EDUCATION/TRAINING PROGRAM

## 2022-01-01 PROCEDURE — 97530 THERAPEUTIC ACTIVITIES: CPT | Mod: CQ

## 2022-01-01 PROCEDURE — 25500020 PHARM REV CODE 255: Performed by: EMERGENCY MEDICINE

## 2022-01-01 PROCEDURE — 99214 PR OFFICE/OUTPT VISIT, EST, LEVL IV, 30-39 MIN: ICD-10-PCS | Mod: S$PBB,,, | Performed by: INTERNAL MEDICINE

## 2022-01-01 PROCEDURE — 99214 PR OFFICE/OUTPT VISIT, EST, LEVL IV, 30-39 MIN: ICD-10-PCS | Mod: ,,, | Performed by: INTERNAL MEDICINE

## 2022-01-01 PROCEDURE — 83735 ASSAY OF MAGNESIUM: CPT | Performed by: INTERNAL MEDICINE

## 2022-01-01 PROCEDURE — 83615 LACTATE (LD) (LDH) ENZYME: CPT | Performed by: STUDENT IN AN ORGANIZED HEALTH CARE EDUCATION/TRAINING PROGRAM

## 2022-01-01 PROCEDURE — 82550 ASSAY OF CK (CPK): CPT | Performed by: EMERGENCY MEDICINE

## 2022-01-01 PROCEDURE — 86592 SYPHILIS TEST NON-TREP QUAL: CPT | Performed by: HOSPITALIST

## 2022-01-01 PROCEDURE — 94644 CONT INHLJ TX 1ST HOUR: CPT

## 2022-01-01 PROCEDURE — 99233 PR SUBSEQUENT HOSPITAL CARE,LEVL III: ICD-10-PCS | Mod: ,,, | Performed by: REGISTERED NURSE

## 2022-01-01 PROCEDURE — 99497 ADVNCD CARE PLAN 30 MIN: CPT | Mod: ,,, | Performed by: REGISTERED NURSE

## 2022-01-01 PROCEDURE — 73030 X-RAY EXAM OF SHOULDER: CPT | Mod: TC,FY,LT

## 2022-01-01 PROCEDURE — 99223 1ST HOSP IP/OBS HIGH 75: CPT | Mod: ,,, | Performed by: STUDENT IN AN ORGANIZED HEALTH CARE EDUCATION/TRAINING PROGRAM

## 2022-01-01 PROCEDURE — 36415 COLL VENOUS BLD VENIPUNCTURE: CPT | Performed by: EMERGENCY MEDICINE

## 2022-01-01 PROCEDURE — 94668 MNPJ CHEST WALL SBSQ: CPT

## 2022-01-01 PROCEDURE — 85025 COMPLETE CBC W/AUTO DIFF WBC: CPT | Performed by: NURSE PRACTITIONER

## 2022-01-01 PROCEDURE — 73030 X-RAY EXAM OF SHOULDER: CPT | Mod: 26,LT,, | Performed by: INTERNAL MEDICINE

## 2022-01-01 PROCEDURE — 99205 PR OFFICE/OUTPT VISIT, NEW, LEVL V, 60-74 MIN: ICD-10-PCS | Mod: ,,, | Performed by: INTERNAL MEDICINE

## 2022-01-01 PROCEDURE — 83605 ASSAY OF LACTIC ACID: CPT | Mod: 91 | Performed by: STUDENT IN AN ORGANIZED HEALTH CARE EDUCATION/TRAINING PROGRAM

## 2022-01-01 PROCEDURE — 80048 BASIC METABOLIC PNL TOTAL CA: CPT | Mod: XB | Performed by: NURSE PRACTITIONER

## 2022-01-01 PROCEDURE — 82746 ASSAY OF FOLIC ACID SERUM: CPT | Performed by: HOSPITALIST

## 2022-01-01 PROCEDURE — 99223 1ST HOSP IP/OBS HIGH 75: CPT | Mod: 25,,, | Performed by: INTERNAL MEDICINE

## 2022-01-01 PROCEDURE — 81003 URINALYSIS AUTO W/O SCOPE: CPT | Performed by: HOSPITALIST

## 2022-01-01 PROCEDURE — 71260 CT THORAX DX C+: CPT | Mod: TC

## 2022-01-01 PROCEDURE — 81001 URINALYSIS AUTO W/SCOPE: CPT | Mod: 59 | Performed by: EMERGENCY MEDICINE

## 2022-01-01 PROCEDURE — 99233 SBSQ HOSP IP/OBS HIGH 50: CPT | Mod: ,,, | Performed by: STUDENT IN AN ORGANIZED HEALTH CARE EDUCATION/TRAINING PROGRAM

## 2022-01-01 PROCEDURE — 97165 OT EVAL LOW COMPLEX 30 MIN: CPT

## 2022-01-01 PROCEDURE — 99999 PR PBB SHADOW E&M-EST. PATIENT-LVL I: ICD-10-PCS | Mod: PBBFAC,,, | Performed by: STUDENT IN AN ORGANIZED HEALTH CARE EDUCATION/TRAINING PROGRAM

## 2022-01-01 PROCEDURE — 81000 URINALYSIS NONAUTO W/SCOPE: CPT | Performed by: STUDENT IN AN ORGANIZED HEALTH CARE EDUCATION/TRAINING PROGRAM

## 2022-01-01 PROCEDURE — 99233 PR SUBSEQUENT HOSPITAL CARE,LEVL III: ICD-10-PCS | Mod: ,,, | Performed by: INTERNAL MEDICINE

## 2022-01-01 PROCEDURE — 99999 PR PBB SHADOW E&M-EST. PATIENT-LVL III: CPT | Mod: PBBFAC,,, | Performed by: STUDENT IN AN ORGANIZED HEALTH CARE EDUCATION/TRAINING PROGRAM

## 2022-01-01 PROCEDURE — 87502 INFLUENZA DNA AMP PROBE: CPT

## 2022-01-01 PROCEDURE — 82077 ASSAY SPEC XCP UR&BREATH IA: CPT | Performed by: EMERGENCY MEDICINE

## 2022-01-01 PROCEDURE — 99214 OFFICE O/P EST MOD 30 MIN: CPT | Mod: ,,, | Performed by: INTERNAL MEDICINE

## 2022-01-01 PROCEDURE — 99999 PR PBB SHADOW E&M-EST. PATIENT-LVL I: CPT | Mod: PBBFAC,,, | Performed by: STUDENT IN AN ORGANIZED HEALTH CARE EDUCATION/TRAINING PROGRAM

## 2022-01-01 PROCEDURE — 99214 OFFICE O/P EST MOD 30 MIN: CPT | Mod: S$PBB,,, | Performed by: INTERNAL MEDICINE

## 2022-01-01 PROCEDURE — 99214 OFFICE O/P EST MOD 30 MIN: CPT | Mod: PBBFAC,25 | Performed by: STUDENT IN AN ORGANIZED HEALTH CARE EDUCATION/TRAINING PROGRAM

## 2022-01-01 PROCEDURE — 87070 CULTURE OTHR SPECIMN AEROBIC: CPT | Performed by: INTERNAL MEDICINE

## 2022-01-01 PROCEDURE — 84484 ASSAY OF TROPONIN QUANT: CPT | Performed by: STUDENT IN AN ORGANIZED HEALTH CARE EDUCATION/TRAINING PROGRAM

## 2022-01-01 PROCEDURE — 99285 EMERGENCY DEPT VISIT HI MDM: CPT | Mod: 25,CS

## 2022-01-01 RX ORDER — ACETAMINOPHEN 325 MG/1
650 TABLET ORAL EVERY 4 HOURS PRN
Status: DISCONTINUED | OUTPATIENT
Start: 2022-01-01 | End: 2022-01-01 | Stop reason: HOSPADM

## 2022-01-01 RX ORDER — MAG HYDROX/ALUMINUM HYD/SIMETH 200-200-20
30 SUSPENSION, ORAL (FINAL DOSE FORM) ORAL 4 TIMES DAILY PRN
Status: DISCONTINUED | OUTPATIENT
Start: 2022-01-01 | End: 2022-01-01 | Stop reason: HOSPADM

## 2022-01-01 RX ORDER — NAPROXEN SODIUM 220 MG/1
81 TABLET, FILM COATED ORAL DAILY
Status: DISCONTINUED | OUTPATIENT
Start: 2022-01-01 | End: 2022-01-01

## 2022-01-01 RX ORDER — IPRATROPIUM BROMIDE AND ALBUTEROL SULFATE 2.5; .5 MG/3ML; MG/3ML
3 SOLUTION RESPIRATORY (INHALATION) EVERY 4 HOURS PRN
Qty: 90 ML | Refills: 1 | Status: SHIPPED | OUTPATIENT
Start: 2022-01-01 | End: 2023-11-09

## 2022-01-01 RX ORDER — LANOLIN ALCOHOL/MO/W.PET/CERES
800 CREAM (GRAM) TOPICAL
Status: DISCONTINUED | OUTPATIENT
Start: 2022-01-01 | End: 2022-01-01 | Stop reason: HOSPADM

## 2022-01-01 RX ORDER — SODIUM CHLORIDE 0.9 % (FLUSH) 0.9 %
10 SYRINGE (ML) INJECTION EVERY 12 HOURS PRN
Status: DISCONTINUED | OUTPATIENT
Start: 2022-01-01 | End: 2022-01-01 | Stop reason: HOSPADM

## 2022-01-01 RX ORDER — LANOLIN ALCOHOL/MO/W.PET/CERES
800 CREAM (GRAM) TOPICAL
Status: DISCONTINUED | OUTPATIENT
Start: 2022-01-01 | End: 2022-01-01

## 2022-01-01 RX ORDER — FUROSEMIDE 10 MG/ML
40 INJECTION INTRAMUSCULAR; INTRAVENOUS
Status: COMPLETED | OUTPATIENT
Start: 2022-01-01 | End: 2022-01-01

## 2022-01-01 RX ORDER — METOPROLOL SUCCINATE 50 MG/1
50 TABLET, EXTENDED RELEASE ORAL DAILY
Status: DISCONTINUED | OUTPATIENT
Start: 2022-01-01 | End: 2022-01-01

## 2022-01-01 RX ORDER — SODIUM CHLORIDE 9 MG/ML
1000 INJECTION, SOLUTION INTRAVENOUS CONTINUOUS
Status: DISCONTINUED | OUTPATIENT
Start: 2022-01-01 | End: 2022-01-01

## 2022-01-01 RX ORDER — POTASSIUM CHLORIDE 20 MEQ/1
40 TABLET, EXTENDED RELEASE ORAL ONCE
Status: COMPLETED | OUTPATIENT
Start: 2022-01-01 | End: 2022-01-01

## 2022-01-01 RX ORDER — HEPARIN 100 UNIT/ML
500 SYRINGE INTRAVENOUS
Status: DISCONTINUED | OUTPATIENT
Start: 2022-01-01 | End: 2022-01-01 | Stop reason: HOSPADM

## 2022-01-01 RX ORDER — MAGNESIUM SULFATE HEPTAHYDRATE 40 MG/ML
2 INJECTION, SOLUTION INTRAVENOUS ONCE
Status: COMPLETED | OUTPATIENT
Start: 2022-01-01 | End: 2022-01-01

## 2022-01-01 RX ORDER — QUETIAPINE FUMARATE 25 MG/1
25 TABLET, FILM COATED ORAL NIGHTLY
Status: DISCONTINUED | OUTPATIENT
Start: 2022-01-01 | End: 2022-01-01

## 2022-01-01 RX ORDER — HEPARIN 100 UNIT/ML
500 SYRINGE INTRAVENOUS
Status: CANCELLED | OUTPATIENT
Start: 2023-01-01

## 2022-01-01 RX ORDER — DOCUSATE SODIUM 100 MG/1
100 CAPSULE, LIQUID FILLED ORAL DAILY PRN
Status: DISCONTINUED | OUTPATIENT
Start: 2022-01-01 | End: 2022-01-01 | Stop reason: HOSPADM

## 2022-01-01 RX ORDER — AMLODIPINE BESYLATE 5 MG/1
10 TABLET ORAL DAILY
Status: DISCONTINUED | OUTPATIENT
Start: 2022-01-01 | End: 2022-01-01 | Stop reason: HOSPADM

## 2022-01-01 RX ORDER — SODIUM CHLORIDE AND POTASSIUM CHLORIDE 150; 900 MG/100ML; MG/100ML
INJECTION, SOLUTION INTRAVENOUS ONCE
Status: COMPLETED | OUTPATIENT
Start: 2022-01-01 | End: 2022-01-01

## 2022-01-01 RX ORDER — SODIUM,POTASSIUM PHOSPHATES 280-250MG
2 POWDER IN PACKET (EA) ORAL
Status: DISCONTINUED | OUTPATIENT
Start: 2022-01-01 | End: 2022-01-01 | Stop reason: HOSPADM

## 2022-01-01 RX ORDER — METOPROLOL TARTRATE 1 MG/ML
5 INJECTION, SOLUTION INTRAVENOUS ONCE
Status: COMPLETED | OUTPATIENT
Start: 2022-01-01 | End: 2022-01-01

## 2022-01-01 RX ORDER — METHYLPREDNISOLONE SOD SUCC 125 MG
80 VIAL (EA) INJECTION ONCE
Status: COMPLETED | OUTPATIENT
Start: 2022-01-01 | End: 2022-01-01

## 2022-01-01 RX ORDER — IBUPROFEN 200 MG
24 TABLET ORAL
Status: DISCONTINUED | OUTPATIENT
Start: 2022-01-01 | End: 2022-01-01 | Stop reason: HOSPADM

## 2022-01-01 RX ORDER — IPRATROPIUM BROMIDE AND ALBUTEROL SULFATE 2.5; .5 MG/3ML; MG/3ML
3 SOLUTION RESPIRATORY (INHALATION)
Status: COMPLETED | OUTPATIENT
Start: 2022-01-01 | End: 2022-01-01

## 2022-01-01 RX ORDER — SODIUM CHLORIDE 9 MG/ML
INJECTION, SOLUTION INTRAVENOUS CONTINUOUS
Status: ACTIVE | OUTPATIENT
Start: 2022-01-01 | End: 2022-01-01

## 2022-01-01 RX ORDER — IPRATROPIUM BROMIDE AND ALBUTEROL SULFATE 2.5; .5 MG/3ML; MG/3ML
3 SOLUTION RESPIRATORY (INHALATION) EVERY 4 HOURS PRN
Status: DISCONTINUED | OUTPATIENT
Start: 2022-01-01 | End: 2022-01-01 | Stop reason: HOSPADM

## 2022-01-01 RX ORDER — GLUCAGON 1 MG
1 KIT INJECTION
Status: DISCONTINUED | OUTPATIENT
Start: 2022-01-01 | End: 2022-01-01 | Stop reason: HOSPADM

## 2022-01-01 RX ORDER — METOPROLOL TARTRATE 50 MG/1
50 TABLET ORAL 3 TIMES DAILY
Status: DISCONTINUED | OUTPATIENT
Start: 2022-01-01 | End: 2022-01-01

## 2022-01-01 RX ORDER — CEFTRIAXONE 2 G/50ML
2 INJECTION, SOLUTION INTRAVENOUS
Status: DISCONTINUED | OUTPATIENT
Start: 2022-01-01 | End: 2022-01-01

## 2022-01-01 RX ORDER — GUAIFENESIN 600 MG/1
1200 TABLET, EXTENDED RELEASE ORAL 2 TIMES DAILY
Status: DISCONTINUED | OUTPATIENT
Start: 2022-01-01 | End: 2022-01-01 | Stop reason: HOSPADM

## 2022-01-01 RX ORDER — FUROSEMIDE 10 MG/ML
40 INJECTION INTRAMUSCULAR; INTRAVENOUS
Status: DISCONTINUED | OUTPATIENT
Start: 2022-01-01 | End: 2022-01-01

## 2022-01-01 RX ORDER — CILOSTAZOL 100 MG/1
100 TABLET ORAL 2 TIMES DAILY
Status: DISCONTINUED | OUTPATIENT
Start: 2022-01-01 | End: 2022-01-01 | Stop reason: HOSPADM

## 2022-01-01 RX ORDER — METOPROLOL TARTRATE 50 MG/1
50 TABLET ORAL 3 TIMES DAILY
Qty: 90 TABLET | Refills: 11 | Status: SHIPPED | OUTPATIENT
Start: 2022-01-01 | End: 2023-12-23

## 2022-01-01 RX ORDER — ONDANSETRON 2 MG/ML
8 INJECTION INTRAMUSCULAR; INTRAVENOUS
Status: CANCELLED | OUTPATIENT
Start: 2023-01-01

## 2022-01-01 RX ORDER — PREDNISONE 20 MG/1
40 TABLET ORAL DAILY
Status: COMPLETED | OUTPATIENT
Start: 2022-01-01 | End: 2022-01-01

## 2022-01-01 RX ORDER — PROCHLORPERAZINE EDISYLATE 5 MG/ML
5 INJECTION INTRAMUSCULAR; INTRAVENOUS EVERY 6 HOURS PRN
Status: DISCONTINUED | OUTPATIENT
Start: 2022-01-01 | End: 2022-01-01 | Stop reason: HOSPADM

## 2022-01-01 RX ORDER — HEPARIN 100 UNIT/ML
500 SYRINGE INTRAVENOUS
Status: CANCELLED | OUTPATIENT
Start: 2022-01-01

## 2022-01-01 RX ORDER — FUROSEMIDE 40 MG/1
40 TABLET ORAL 2 TIMES DAILY
Status: DISCONTINUED | OUTPATIENT
Start: 2022-01-01 | End: 2022-01-01 | Stop reason: HOSPADM

## 2022-01-01 RX ORDER — POTASSIUM CHLORIDE 20 MEQ/1
60 TABLET, EXTENDED RELEASE ORAL ONCE
Status: COMPLETED | OUTPATIENT
Start: 2022-01-01 | End: 2022-01-01

## 2022-01-01 RX ORDER — NAPROXEN SODIUM 220 MG/1
81 TABLET, FILM COATED ORAL DAILY
Status: DISCONTINUED | OUTPATIENT
Start: 2022-01-01 | End: 2022-01-01 | Stop reason: HOSPADM

## 2022-01-01 RX ORDER — CILOSTAZOL 100 MG/1
100 TABLET ORAL 2 TIMES DAILY
Status: DISCONTINUED | OUTPATIENT
Start: 2022-01-01 | End: 2022-01-01

## 2022-01-01 RX ORDER — PREDNISONE 20 MG/1
40 TABLET ORAL DAILY
Status: DISCONTINUED | OUTPATIENT
Start: 2022-01-01 | End: 2022-01-01 | Stop reason: HOSPADM

## 2022-01-01 RX ORDER — HYDROCODONE BITARTRATE AND ACETAMINOPHEN 5; 325 MG/1; MG/1
1 TABLET ORAL EVERY 6 HOURS PRN
Status: DISCONTINUED | OUTPATIENT
Start: 2022-01-01 | End: 2022-01-01 | Stop reason: HOSPADM

## 2022-01-01 RX ORDER — METHYLPREDNISOLONE SOD SUCC 125 MG
125 VIAL (EA) INJECTION
Status: COMPLETED | OUTPATIENT
Start: 2022-01-01 | End: 2022-01-01

## 2022-01-01 RX ORDER — CARVEDILOL 6.25 MG/1
6.25 TABLET ORAL 2 TIMES DAILY
Status: DISCONTINUED | OUTPATIENT
Start: 2022-01-01 | End: 2022-01-01

## 2022-01-01 RX ORDER — BISACODYL 10 MG
10 SUPPOSITORY, RECTAL RECTAL DAILY PRN
Status: DISCONTINUED | OUTPATIENT
Start: 2022-01-01 | End: 2022-01-01 | Stop reason: HOSPADM

## 2022-01-01 RX ORDER — SIMETHICONE 80 MG
1 TABLET,CHEWABLE ORAL 4 TIMES DAILY PRN
Status: DISCONTINUED | OUTPATIENT
Start: 2022-01-01 | End: 2022-01-01 | Stop reason: HOSPADM

## 2022-01-01 RX ORDER — ONDANSETRON 2 MG/ML
8 INJECTION INTRAMUSCULAR; INTRAVENOUS
Status: CANCELLED | OUTPATIENT
Start: 2022-01-01

## 2022-01-01 RX ORDER — CLOPIDOGREL BISULFATE 75 MG/1
75 TABLET ORAL DAILY
Status: DISCONTINUED | OUTPATIENT
Start: 2022-01-01 | End: 2022-01-01 | Stop reason: HOSPADM

## 2022-01-01 RX ORDER — DOXYCYCLINE HYCLATE 100 MG
100 TABLET ORAL EVERY 12 HOURS
Status: DISCONTINUED | OUTPATIENT
Start: 2022-01-01 | End: 2022-01-01

## 2022-01-01 RX ORDER — IPRATROPIUM BROMIDE AND ALBUTEROL SULFATE 2.5; .5 MG/3ML; MG/3ML
3 SOLUTION RESPIRATORY (INHALATION)
Status: DISCONTINUED | OUTPATIENT
Start: 2022-01-01 | End: 2022-01-01 | Stop reason: HOSPADM

## 2022-01-01 RX ORDER — ONDANSETRON 2 MG/ML
4 INJECTION INTRAMUSCULAR; INTRAVENOUS EVERY 8 HOURS PRN
Status: DISCONTINUED | OUTPATIENT
Start: 2022-01-01 | End: 2022-01-01 | Stop reason: HOSPADM

## 2022-01-01 RX ORDER — ACETAMINOPHEN 325 MG/1
650 TABLET ORAL EVERY 8 HOURS PRN
Status: DISCONTINUED | OUTPATIENT
Start: 2022-01-01 | End: 2022-01-01 | Stop reason: HOSPADM

## 2022-01-01 RX ORDER — CILOSTAZOL 100 MG/1
100 TABLET ORAL 2 TIMES DAILY
Qty: 60 TABLET | Refills: 11 | Status: SHIPPED | OUTPATIENT
Start: 2022-01-01 | End: 2023-01-01

## 2022-01-01 RX ORDER — IBUPROFEN 200 MG
16 TABLET ORAL
Status: DISCONTINUED | OUTPATIENT
Start: 2022-01-01 | End: 2022-01-01 | Stop reason: HOSPADM

## 2022-01-01 RX ORDER — CILOSTAZOL 50 MG/1
100 TABLET ORAL 2 TIMES DAILY
Status: DISCONTINUED | OUTPATIENT
Start: 2022-01-01 | End: 2022-01-01 | Stop reason: HOSPADM

## 2022-01-01 RX ORDER — LOSARTAN POTASSIUM 25 MG/1
100 TABLET ORAL DAILY
Status: DISCONTINUED | OUTPATIENT
Start: 2022-01-01 | End: 2022-01-01

## 2022-01-01 RX ORDER — FUROSEMIDE 10 MG/ML
80 INJECTION INTRAMUSCULAR; INTRAVENOUS
Status: DISCONTINUED | OUTPATIENT
Start: 2022-01-01 | End: 2022-01-01

## 2022-01-01 RX ORDER — LORAZEPAM 2 MG/ML
0.5 INJECTION INTRAMUSCULAR
Status: COMPLETED | OUTPATIENT
Start: 2022-01-01 | End: 2022-01-01

## 2022-01-01 RX ORDER — AMOXICILLIN AND CLAVULANATE POTASSIUM 875; 125 MG/1; MG/1
1 TABLET, FILM COATED ORAL EVERY 12 HOURS
Status: DISCONTINUED | OUTPATIENT
Start: 2022-01-01 | End: 2022-01-01

## 2022-01-01 RX ORDER — ASPIRIN 325 MG
325 TABLET ORAL
Status: COMPLETED | OUTPATIENT
Start: 2022-01-01 | End: 2022-01-01

## 2022-01-01 RX ORDER — IPRATROPIUM BROMIDE AND ALBUTEROL SULFATE 2.5; .5 MG/3ML; MG/3ML
3 SOLUTION RESPIRATORY (INHALATION) ONCE
Status: COMPLETED | OUTPATIENT
Start: 2022-01-01 | End: 2022-01-01

## 2022-01-01 RX ORDER — ONDANSETRON 2 MG/ML
8 INJECTION INTRAMUSCULAR; INTRAVENOUS
Status: COMPLETED | OUTPATIENT
Start: 2022-01-01 | End: 2022-01-01

## 2022-01-01 RX ORDER — SODIUM CHLORIDE 0.9 % (FLUSH) 0.9 %
10 SYRINGE (ML) INJECTION
Status: CANCELLED | OUTPATIENT
Start: 2022-01-01

## 2022-01-01 RX ORDER — SODIUM CHLORIDE 0.9 % (FLUSH) 0.9 %
10 SYRINGE (ML) INJECTION
Status: DISCONTINUED | OUTPATIENT
Start: 2022-01-01 | End: 2022-01-01 | Stop reason: HOSPADM

## 2022-01-01 RX ORDER — SODIUM,POTASSIUM PHOSPHATES 280-250MG
2 POWDER IN PACKET (EA) ORAL
Status: DISCONTINUED | OUTPATIENT
Start: 2022-01-01 | End: 2022-01-01

## 2022-01-01 RX ORDER — CLOPIDOGREL BISULFATE 75 MG/1
75 TABLET ORAL DAILY
Status: DISCONTINUED | OUTPATIENT
Start: 2022-01-01 | End: 2022-01-01

## 2022-01-01 RX ORDER — HEPARIN 100 UNIT/ML
5 SYRINGE INTRAVENOUS ONCE
Status: COMPLETED | OUTPATIENT
Start: 2022-01-01 | End: 2022-01-01

## 2022-01-01 RX ORDER — TRAMADOL HYDROCHLORIDE 50 MG/1
50 TABLET ORAL EVERY 12 HOURS PRN
Qty: 28 TABLET | Refills: 0 | Status: SHIPPED | OUTPATIENT
Start: 2022-01-01 | End: 2022-01-01

## 2022-01-01 RX ORDER — HYDROCODONE BITARTRATE AND ACETAMINOPHEN 7.5; 325 MG/1; MG/1
1 TABLET ORAL EVERY 4 HOURS PRN
COMMUNITY
Start: 2022-01-01 | End: 2023-01-01

## 2022-01-01 RX ORDER — IPRATROPIUM BROMIDE AND ALBUTEROL SULFATE 2.5; .5 MG/3ML; MG/3ML
3 SOLUTION RESPIRATORY (INHALATION) EVERY 4 HOURS
Status: DISCONTINUED | OUTPATIENT
Start: 2022-01-01 | End: 2022-01-01

## 2022-01-01 RX ORDER — FLUTICASONE FUROATE AND VILANTEROL 100; 25 UG/1; UG/1
1 POWDER RESPIRATORY (INHALATION) DAILY
Status: DISCONTINUED | OUTPATIENT
Start: 2022-01-01 | End: 2022-01-01 | Stop reason: HOSPADM

## 2022-01-01 RX ORDER — NALOXONE HCL 0.4 MG/ML
0.02 VIAL (ML) INJECTION
Status: DISCONTINUED | OUTPATIENT
Start: 2022-01-01 | End: 2022-01-01 | Stop reason: HOSPADM

## 2022-01-01 RX ORDER — BENZONATATE 100 MG/1
200 CAPSULE ORAL 3 TIMES DAILY PRN
Status: DISCONTINUED | OUTPATIENT
Start: 2022-01-01 | End: 2022-01-01 | Stop reason: HOSPADM

## 2022-01-01 RX ORDER — CARVEDILOL 6.25 MG/1
6.25 TABLET ORAL 2 TIMES DAILY
Status: DISCONTINUED | OUTPATIENT
Start: 2022-01-01 | End: 2022-01-01 | Stop reason: HOSPADM

## 2022-01-01 RX ORDER — METOPROLOL SUCCINATE 50 MG/1
50 TABLET, EXTENDED RELEASE ORAL 2 TIMES DAILY
Qty: 60 TABLET | Refills: 1 | Status: SHIPPED | OUTPATIENT
Start: 2022-01-01 | End: 2022-01-01

## 2022-01-01 RX ORDER — LEVOFLOXACIN 750 MG/1
750 TABLET ORAL DAILY
Status: COMPLETED | OUTPATIENT
Start: 2022-01-01 | End: 2022-01-01

## 2022-01-01 RX ORDER — ALBUTEROL SULFATE 90 UG/1
2 AEROSOL, METERED RESPIRATORY (INHALATION) EVERY 6 HOURS PRN
Qty: 18 G | Refills: 11 | Status: SHIPPED | OUTPATIENT
Start: 2022-01-01 | End: 2023-09-06

## 2022-01-01 RX ORDER — POTASSIUM CHLORIDE 750 MG/1
50 TABLET, EXTENDED RELEASE ORAL ONCE
Status: COMPLETED | OUTPATIENT
Start: 2022-01-01 | End: 2022-01-01

## 2022-01-01 RX ORDER — HEPARIN SODIUM 5000 [USP'U]/ML
5000 INJECTION, SOLUTION INTRAVENOUS; SUBCUTANEOUS EVERY 8 HOURS
Status: DISCONTINUED | OUTPATIENT
Start: 2022-01-01 | End: 2022-01-01

## 2022-01-01 RX ORDER — PREDNISONE 50 MG/1
50 TABLET ORAL DAILY
Status: DISCONTINUED | OUTPATIENT
Start: 2022-01-01 | End: 2022-01-01

## 2022-01-01 RX ORDER — POLYETHYLENE GLYCOL 3350 17 G/17G
17 POWDER, FOR SOLUTION ORAL DAILY
Status: DISCONTINUED | OUTPATIENT
Start: 2022-01-01 | End: 2022-01-01 | Stop reason: HOSPADM

## 2022-01-01 RX ORDER — METOPROLOL TARTRATE 50 MG/1
50 TABLET ORAL 4 TIMES DAILY
Status: DISCONTINUED | OUTPATIENT
Start: 2022-01-01 | End: 2022-01-01

## 2022-01-01 RX ORDER — SODIUM CHLORIDE 0.9 % (FLUSH) 0.9 %
10 SYRINGE (ML) INJECTION
Status: CANCELLED | OUTPATIENT
Start: 2023-01-01

## 2022-01-01 RX ORDER — VANCOMYCIN HCL IN 5 % DEXTROSE 1G/250ML
1000 PLASTIC BAG, INJECTION (ML) INTRAVENOUS
Status: DISCONTINUED | OUTPATIENT
Start: 2022-01-01 | End: 2022-01-01 | Stop reason: DRUGHIGH

## 2022-01-01 RX ORDER — PREDNISONE 20 MG/1
40 TABLET ORAL DAILY
Status: DISCONTINUED | OUTPATIENT
Start: 2022-01-01 | End: 2022-01-01

## 2022-01-01 RX ORDER — IPRATROPIUM BROMIDE AND ALBUTEROL SULFATE 2.5; .5 MG/3ML; MG/3ML
3 SOLUTION RESPIRATORY (INHALATION) EVERY 6 HOURS
Status: DISCONTINUED | OUTPATIENT
Start: 2022-01-01 | End: 2022-01-01 | Stop reason: HOSPADM

## 2022-01-01 RX ORDER — TIOTROPIUM BROMIDE 18 UG/1
18 CAPSULE ORAL; RESPIRATORY (INHALATION) DAILY
Qty: 30 CAPSULE | Refills: 1 | Status: SHIPPED | OUTPATIENT
Start: 2022-01-01 | End: 2023-11-09

## 2022-01-01 RX ORDER — METOPROLOL SUCCINATE 50 MG/1
50 TABLET, EXTENDED RELEASE ORAL 2 TIMES DAILY
Status: DISCONTINUED | OUTPATIENT
Start: 2022-01-01 | End: 2022-01-01 | Stop reason: HOSPADM

## 2022-01-01 RX ORDER — IPRATROPIUM BROMIDE 0.5 MG/2.5ML
1 SOLUTION RESPIRATORY (INHALATION)
Status: COMPLETED | OUTPATIENT
Start: 2022-01-01 | End: 2022-01-01

## 2022-01-01 RX ORDER — FLUTICASONE PROPIONATE AND SALMETEROL 100; 50 UG/1; UG/1
1 POWDER RESPIRATORY (INHALATION) 2 TIMES DAILY
Qty: 60 EACH | Refills: 1 | Status: SHIPPED | OUTPATIENT
Start: 2022-01-01 | End: 2023-01-01 | Stop reason: ALTCHOICE

## 2022-01-01 RX ORDER — AMLODIPINE BESYLATE 5 MG/1
10 TABLET ORAL DAILY
Status: DISCONTINUED | OUTPATIENT
Start: 2022-01-01 | End: 2022-01-01

## 2022-01-01 RX ORDER — VANCOMYCIN HCL IN 5 % DEXTROSE 1G/250ML
1000 PLASTIC BAG, INJECTION (ML) INTRAVENOUS
Status: DISCONTINUED | OUTPATIENT
Start: 2022-01-01 | End: 2022-01-01

## 2022-01-01 RX ORDER — FAMOTIDINE 10 MG/ML
20 INJECTION INTRAVENOUS 2 TIMES DAILY
Status: DISCONTINUED | OUTPATIENT
Start: 2022-01-01 | End: 2022-01-01 | Stop reason: HOSPADM

## 2022-01-01 RX ORDER — ATORVASTATIN CALCIUM 40 MG/1
80 TABLET, FILM COATED ORAL DAILY
Status: DISCONTINUED | OUTPATIENT
Start: 2022-01-01 | End: 2022-01-01 | Stop reason: HOSPADM

## 2022-01-01 RX ORDER — IPRATROPIUM BROMIDE AND ALBUTEROL SULFATE 2.5; .5 MG/3ML; MG/3ML
3 SOLUTION RESPIRATORY (INHALATION) EVERY 4 HOURS
Status: DISCONTINUED | OUTPATIENT
Start: 2022-01-01 | End: 2022-01-01 | Stop reason: HOSPADM

## 2022-01-01 RX ORDER — POLYETHYLENE GLYCOL 3350 17 G/17G
17 POWDER, FOR SOLUTION ORAL DAILY
Qty: 510 G | Refills: 0 | Status: SHIPPED | OUTPATIENT
Start: 2022-01-01 | End: 2023-01-01

## 2022-01-01 RX ORDER — POTASSIUM CHLORIDE 20 MEQ/1
40 TABLET, EXTENDED RELEASE ORAL DAILY
Qty: 10 TABLET | Refills: 0 | Status: ON HOLD | OUTPATIENT
Start: 2022-01-01 | End: 2022-01-01 | Stop reason: HOSPADM

## 2022-01-01 RX ORDER — PREDNISONE 10 MG/1
TABLET ORAL
Qty: 30 TABLET | Refills: 0 | Status: ON HOLD | OUTPATIENT
Start: 2022-01-01 | End: 2022-01-01 | Stop reason: HOSPADM

## 2022-01-01 RX ORDER — ENOXAPARIN SODIUM 100 MG/ML
40 INJECTION SUBCUTANEOUS EVERY 24 HOURS
Status: DISCONTINUED | OUTPATIENT
Start: 2022-01-01 | End: 2022-01-01 | Stop reason: HOSPADM

## 2022-01-01 RX ORDER — TALC
6 POWDER (GRAM) TOPICAL NIGHTLY PRN
Status: DISCONTINUED | OUTPATIENT
Start: 2022-01-01 | End: 2022-01-01 | Stop reason: HOSPADM

## 2022-01-01 RX ORDER — ACETAMINOPHEN 325 MG/1
650 TABLET ORAL EVERY 6 HOURS PRN
Status: DISCONTINUED | OUTPATIENT
Start: 2022-01-01 | End: 2022-01-01 | Stop reason: HOSPADM

## 2022-01-01 RX ORDER — FUROSEMIDE 40 MG/1
40 TABLET ORAL 2 TIMES DAILY
Qty: 60 TABLET | Refills: 2 | Status: SHIPPED | OUTPATIENT
Start: 2022-01-01 | End: 2023-01-01

## 2022-01-01 RX ORDER — MORPHINE SULFATE 2 MG/ML
1 INJECTION, SOLUTION INTRAMUSCULAR; INTRAVENOUS EVERY 6 HOURS PRN
Status: DISCONTINUED | OUTPATIENT
Start: 2022-01-01 | End: 2022-01-01 | Stop reason: HOSPADM

## 2022-01-01 RX ORDER — SODIUM,POTASSIUM PHOSPHATES 280-250MG
2 POWDER IN PACKET (EA) ORAL ONCE
Status: COMPLETED | OUTPATIENT
Start: 2022-01-01 | End: 2022-01-01

## 2022-01-01 RX ORDER — METOPROLOL TARTRATE 25 MG/1
25 TABLET, FILM COATED ORAL 3 TIMES DAILY
Status: DISCONTINUED | OUTPATIENT
Start: 2022-01-01 | End: 2022-01-01

## 2022-01-01 RX ORDER — HYDROCODONE BITARTRATE AND ACETAMINOPHEN 5; 325 MG/1; MG/1
1 TABLET ORAL EVERY 6 HOURS PRN
Status: DISCONTINUED | OUTPATIENT
Start: 2022-01-01 | End: 2022-01-01

## 2022-01-01 RX ORDER — AMIODARONE HYDROCHLORIDE 100 MG/1
100 TABLET ORAL ONCE
Status: COMPLETED | OUTPATIENT
Start: 2022-01-01 | End: 2022-01-01

## 2022-01-01 RX ORDER — AMLODIPINE BESYLATE 10 MG/1
10 TABLET ORAL DAILY
Qty: 90 TABLET | Refills: 3 | Status: ON HOLD | OUTPATIENT
Start: 2022-01-01 | End: 2023-01-01 | Stop reason: HOSPADM

## 2022-01-01 RX ORDER — DRONABINOL 5 MG/1
5 CAPSULE ORAL
Qty: 28 CAPSULE | Refills: 0 | Status: SHIPPED | OUTPATIENT
Start: 2022-01-01 | End: 2022-01-01

## 2022-01-01 RX ORDER — LOSARTAN POTASSIUM 50 MG/1
100 TABLET ORAL DAILY
Status: DISCONTINUED | OUTPATIENT
Start: 2022-01-01 | End: 2022-01-01 | Stop reason: HOSPADM

## 2022-01-01 RX ORDER — FUROSEMIDE 40 MG/1
40 TABLET ORAL 2 TIMES DAILY
Status: DISCONTINUED | OUTPATIENT
Start: 2022-01-01 | End: 2022-01-01

## 2022-01-01 RX ORDER — FAMOTIDINE 10 MG/ML
20 INJECTION INTRAVENOUS DAILY
Status: DISCONTINUED | OUTPATIENT
Start: 2022-01-01 | End: 2022-01-01

## 2022-01-01 RX ORDER — LEVALBUTEROL INHALATION SOLUTION 1.25 MG/3ML
3.75 SOLUTION RESPIRATORY (INHALATION)
Status: COMPLETED | OUTPATIENT
Start: 2022-01-01 | End: 2022-01-01

## 2022-01-01 RX ORDER — POTASSIUM CHLORIDE 750 MG/1
30 TABLET, EXTENDED RELEASE ORAL ONCE
Status: COMPLETED | OUTPATIENT
Start: 2022-01-01 | End: 2022-01-01

## 2022-01-01 RX ORDER — POTASSIUM CHLORIDE 20 MEQ/1
40 TABLET, EXTENDED RELEASE ORAL EVERY 4 HOURS
Status: COMPLETED | OUTPATIENT
Start: 2022-01-01 | End: 2022-01-01

## 2022-01-01 RX ORDER — METOPROLOL TARTRATE 50 MG/1
50 TABLET ORAL 4 TIMES DAILY
Status: DISCONTINUED | OUTPATIENT
Start: 2022-01-01 | End: 2022-01-01 | Stop reason: HOSPADM

## 2022-01-01 RX ORDER — METOPROLOL TARTRATE 1 MG/ML
2.5 INJECTION, SOLUTION INTRAVENOUS ONCE
Status: COMPLETED | OUTPATIENT
Start: 2022-01-01 | End: 2022-01-01

## 2022-01-01 RX ADMIN — CLOPIDOGREL BISULFATE 75 MG: 75 TABLET ORAL at 08:11

## 2022-01-01 RX ADMIN — IPRATROPIUM BROMIDE AND ALBUTEROL SULFATE 3 ML: 2.5; .5 SOLUTION RESPIRATORY (INHALATION) at 09:11

## 2022-01-01 RX ADMIN — PIPERACILLIN AND TAZOBACTAM 4.5 G: 4; .5 INJECTION, POWDER, LYOPHILIZED, FOR SOLUTION INTRAVENOUS; PARENTERAL at 02:12

## 2022-01-01 RX ADMIN — CILOSTAZOL 100 MG: 100 TABLET ORAL at 08:09

## 2022-01-01 RX ADMIN — CLOPIDOGREL 75 MG: 75 TABLET, FILM COATED ORAL at 10:10

## 2022-01-01 RX ADMIN — GUAIFENESIN 1200 MG: 600 TABLET, EXTENDED RELEASE ORAL at 08:12

## 2022-01-01 RX ADMIN — IPRATROPIUM BROMIDE AND ALBUTEROL SULFATE 3 ML: 2.5; .5 SOLUTION RESPIRATORY (INHALATION) at 07:10

## 2022-01-01 RX ADMIN — POLYETHYLENE GLYCOL 3350 17 G: 17 POWDER, FOR SOLUTION ORAL at 08:12

## 2022-01-01 RX ADMIN — CLOPIDOGREL BISULFATE 75 MG: 75 TABLET ORAL at 09:12

## 2022-01-01 RX ADMIN — FUROSEMIDE 80 MG: 10 INJECTION, SOLUTION INTRAMUSCULAR; INTRAVENOUS at 12:09

## 2022-01-01 RX ADMIN — ASPIRIN 81 MG 81 MG: 81 TABLET ORAL at 08:09

## 2022-01-01 RX ADMIN — CILOSTAZOL 100 MG: 50 TABLET ORAL at 08:10

## 2022-01-01 RX ADMIN — PREDNISONE 40 MG: 20 TABLET ORAL at 09:09

## 2022-01-01 RX ADMIN — FUROSEMIDE 40 MG: 40 TABLET ORAL at 09:10

## 2022-01-01 RX ADMIN — CLOPIDOGREL BISULFATE 75 MG: 75 TABLET, FILM COATED ORAL at 08:09

## 2022-01-01 RX ADMIN — CILOSTAZOL 100 MG: 100 TABLET ORAL at 10:12

## 2022-01-01 RX ADMIN — VANCOMYCIN HYDROCHLORIDE 1000 MG: 1 INJECTION, POWDER, LYOPHILIZED, FOR SOLUTION INTRAVENOUS at 05:12

## 2022-01-01 RX ADMIN — QUETIAPINE FUMARATE 12.5 MG: 25 TABLET ORAL at 10:11

## 2022-01-01 RX ADMIN — ENOXAPARIN SODIUM 40 MG: 40 INJECTION SUBCUTANEOUS at 05:09

## 2022-01-01 RX ADMIN — CILOSTAZOL 100 MG: 50 TABLET ORAL at 09:09

## 2022-01-01 RX ADMIN — ASPIRIN 81 MG CHEWABLE TABLET 81 MG: 81 TABLET CHEWABLE at 08:11

## 2022-01-01 RX ADMIN — CARVEDILOL 6.25 MG: 6.25 TABLET, FILM COATED ORAL at 09:09

## 2022-01-01 RX ADMIN — AMOXICILLIN AND CLAVULANATE POTASSIUM 1 TABLET: 875; 125 TABLET, FILM COATED ORAL at 08:10

## 2022-01-01 RX ADMIN — FUROSEMIDE 40 MG: 40 TABLET ORAL at 08:11

## 2022-01-01 RX ADMIN — METOPROLOL TARTRATE 50 MG: 50 TABLET, FILM COATED ORAL at 09:12

## 2022-01-01 RX ADMIN — IPRATROPIUM BROMIDE AND ALBUTEROL SULFATE 3 ML: 2.5; .5 SOLUTION RESPIRATORY (INHALATION) at 07:11

## 2022-01-01 RX ADMIN — METOPROLOL SUCCINATE 50 MG: 50 TABLET, EXTENDED RELEASE ORAL at 08:11

## 2022-01-01 RX ADMIN — IPRATROPIUM BROMIDE AND ALBUTEROL SULFATE 3 ML: 2.5; .5 SOLUTION RESPIRATORY (INHALATION) at 08:10

## 2022-01-01 RX ADMIN — AMLODIPINE BESYLATE 10 MG: 5 TABLET ORAL at 10:12

## 2022-01-01 RX ADMIN — IPRATROPIUM BROMIDE AND ALBUTEROL SULFATE 3 ML: 2.5; .5 SOLUTION RESPIRATORY (INHALATION) at 03:10

## 2022-01-01 RX ADMIN — APIXABAN 5 MG: 5 TABLET, FILM COATED ORAL at 08:12

## 2022-01-01 RX ADMIN — IPRATROPIUM BROMIDE AND ALBUTEROL SULFATE 3 ML: 2.5; .5 SOLUTION RESPIRATORY (INHALATION) at 04:11

## 2022-01-01 RX ADMIN — CILOSTAZOL 100 MG: 50 TABLET ORAL at 10:10

## 2022-01-01 RX ADMIN — FUROSEMIDE 40 MG: 40 TABLET ORAL at 09:12

## 2022-01-01 RX ADMIN — FLUTICASONE FUROATE AND VILANTEROL TRIFENATATE 1 PUFF: 100; 25 POWDER RESPIRATORY (INHALATION) at 07:10

## 2022-01-01 RX ADMIN — ASPIRIN 81 MG CHEWABLE TABLET 81 MG: 81 TABLET CHEWABLE at 09:11

## 2022-01-01 RX ADMIN — ATORVASTATIN CALCIUM 80 MG: 40 TABLET, FILM COATED ORAL at 04:09

## 2022-01-01 RX ADMIN — PREDNISONE 40 MG: 20 TABLET ORAL at 09:10

## 2022-01-01 RX ADMIN — AMLODIPINE BESYLATE 10 MG: 5 TABLET ORAL at 08:11

## 2022-01-01 RX ADMIN — IPRATROPIUM BROMIDE AND ALBUTEROL SULFATE 3 ML: 2.5; .5 SOLUTION RESPIRATORY (INHALATION) at 08:09

## 2022-01-01 RX ADMIN — CILOSTAZOL 100 MG: 100 TABLET ORAL at 08:11

## 2022-01-01 RX ADMIN — HEPARIN 500 UNITS: 100 SYRINGE at 02:09

## 2022-01-01 RX ADMIN — IPRATROPIUM BROMIDE AND ALBUTEROL SULFATE 3 ML: 2.5; .5 SOLUTION RESPIRATORY (INHALATION) at 11:12

## 2022-01-01 RX ADMIN — ASPIRIN 81 MG CHEWABLE TABLET 81 MG: 81 TABLET CHEWABLE at 08:12

## 2022-01-01 RX ADMIN — GUAIFENESIN 1200 MG: 600 TABLET, EXTENDED RELEASE ORAL at 11:12

## 2022-01-01 RX ADMIN — AMLODIPINE BESYLATE 10 MG: 5 TABLET ORAL at 09:11

## 2022-01-01 RX ADMIN — METOPROLOL SUCCINATE 50 MG: 50 TABLET, EXTENDED RELEASE ORAL at 08:10

## 2022-01-01 RX ADMIN — POTASSIUM CHLORIDE 40 MEQ: 1500 TABLET, EXTENDED RELEASE ORAL at 11:12

## 2022-01-01 RX ADMIN — IPRATROPIUM BROMIDE AND ALBUTEROL SULFATE 3 ML: 2.5; .5 SOLUTION RESPIRATORY (INHALATION) at 07:12

## 2022-01-01 RX ADMIN — IPRATROPIUM BROMIDE AND ALBUTEROL SULFATE 3 ML: 2.5; .5 SOLUTION RESPIRATORY (INHALATION) at 03:12

## 2022-01-01 RX ADMIN — Medication 2 PACKET: at 11:12

## 2022-01-01 RX ADMIN — METOPROLOL SUCCINATE 50 MG: 50 TABLET, EXTENDED RELEASE ORAL at 09:11

## 2022-01-01 RX ADMIN — CARVEDILOL 6.25 MG: 6.25 TABLET, FILM COATED ORAL at 08:09

## 2022-01-01 RX ADMIN — FLUTICASONE FUROATE AND VILANTEROL TRIFENATATE 1 PUFF: 100; 25 POWDER RESPIRATORY (INHALATION) at 09:09

## 2022-01-01 RX ADMIN — PIPERACILLIN AND TAZOBACTAM 4.5 G: 4; .5 INJECTION, POWDER, LYOPHILIZED, FOR SOLUTION INTRAVENOUS; PARENTERAL at 12:12

## 2022-01-01 RX ADMIN — FUROSEMIDE 40 MG: 40 TABLET ORAL at 10:10

## 2022-01-01 RX ADMIN — IPRATROPIUM BROMIDE AND ALBUTEROL SULFATE 3 ML: 2.5; .5 SOLUTION RESPIRATORY (INHALATION) at 12:12

## 2022-01-01 RX ADMIN — POTASSIUM CHLORIDE 40 MEQ: 1500 TABLET, EXTENDED RELEASE ORAL at 10:12

## 2022-01-01 RX ADMIN — RIVAROXABAN 20 MG: 20 TABLET, FILM COATED ORAL at 04:11

## 2022-01-01 RX ADMIN — VANCOMYCIN HYDROCHLORIDE 750 MG: 750 INJECTION, POWDER, LYOPHILIZED, FOR SOLUTION INTRAVENOUS at 07:10

## 2022-01-01 RX ADMIN — METOPROLOL SUCCINATE 50 MG: 50 TABLET, EXTENDED RELEASE ORAL at 10:10

## 2022-01-01 RX ADMIN — GUAIFENESIN 1200 MG: 600 TABLET, EXTENDED RELEASE ORAL at 09:12

## 2022-01-01 RX ADMIN — APIXABAN 5 MG: 5 TABLET, FILM COATED ORAL at 08:10

## 2022-01-01 RX ADMIN — METOPROLOL TARTRATE 50 MG: 50 TABLET, FILM COATED ORAL at 01:12

## 2022-01-01 RX ADMIN — Medication 10 ML: at 02:09

## 2022-01-01 RX ADMIN — VANCOMYCIN HYDROCHLORIDE 1000 MG: 1 INJECTION, POWDER, LYOPHILIZED, FOR SOLUTION INTRAVENOUS at 11:10

## 2022-01-01 RX ADMIN — CILOSTAZOL 100 MG: 100 TABLET ORAL at 11:11

## 2022-01-01 RX ADMIN — ATORVASTATIN CALCIUM 80 MG: 40 TABLET, FILM COATED ORAL at 08:12

## 2022-01-01 RX ADMIN — ATORVASTATIN CALCIUM 80 MG: 40 TABLET, FILM COATED ORAL at 08:10

## 2022-01-01 RX ADMIN — FUROSEMIDE 40 MG: 40 TABLET ORAL at 06:12

## 2022-01-01 RX ADMIN — Medication 10 ML: at 12:10

## 2022-01-01 RX ADMIN — MAGNESIUM SULFATE HEPTAHYDRATE 2 G: 40 INJECTION, SOLUTION INTRAVENOUS at 10:12

## 2022-01-01 RX ADMIN — DOXYCYCLINE HYCLATE 100 MG: 100 TABLET, COATED ORAL at 08:12

## 2022-01-01 RX ADMIN — FAMOTIDINE 20 MG: 10 INJECTION INTRAVENOUS at 08:09

## 2022-01-01 RX ADMIN — CLOPIDOGREL BISULFATE 75 MG: 75 TABLET ORAL at 09:11

## 2022-01-01 RX ADMIN — FLUTICASONE FUROATE AND VILANTEROL TRIFENATATE 1 PUFF: 100; 25 POWDER RESPIRATORY (INHALATION) at 08:10

## 2022-01-01 RX ADMIN — IPRATROPIUM BROMIDE AND ALBUTEROL SULFATE 3 ML: 2.5; .5 SOLUTION RESPIRATORY (INHALATION) at 08:11

## 2022-01-01 RX ADMIN — CEFTRIAXONE SODIUM 2 G: 2 INJECTION, SOLUTION INTRAVENOUS at 10:12

## 2022-01-01 RX ADMIN — IPRATROPIUM BROMIDE AND ALBUTEROL SULFATE 3 ML: .5; 3 SOLUTION RESPIRATORY (INHALATION) at 07:09

## 2022-01-01 RX ADMIN — ATORVASTATIN CALCIUM 80 MG: 40 TABLET, FILM COATED ORAL at 09:11

## 2022-01-01 RX ADMIN — IPRATROPIUM BROMIDE AND ALBUTEROL SULFATE 3 ML: 2.5; .5 SOLUTION RESPIRATORY (INHALATION) at 11:11

## 2022-01-01 RX ADMIN — ASPIRIN 81 MG CHEWABLE TABLET 81 MG: 81 TABLET CHEWABLE at 09:09

## 2022-01-01 RX ADMIN — CILOSTAZOL 100 MG: 100 TABLET ORAL at 09:11

## 2022-01-01 RX ADMIN — DOXYCYCLINE HYCLATE 100 MG: 100 TABLET, COATED ORAL at 05:12

## 2022-01-01 RX ADMIN — IPRATROPIUM BROMIDE AND ALBUTEROL SULFATE 3 ML: 2.5; .5 SOLUTION RESPIRATORY (INHALATION) at 04:12

## 2022-01-01 RX ADMIN — APIXABAN 5 MG: 5 TABLET, FILM COATED ORAL at 08:11

## 2022-01-01 RX ADMIN — AMIODARONE HYDROCHLORIDE 100 MG: 100 TABLET ORAL at 09:12

## 2022-01-01 RX ADMIN — PREDNISONE 50 MG: 50 TABLET ORAL at 09:11

## 2022-01-01 RX ADMIN — MAGNESIUM SULFATE HEPTAHYDRATE 2 G: 40 INJECTION, SOLUTION INTRAVENOUS at 06:09

## 2022-01-01 RX ADMIN — ATORVASTATIN CALCIUM 80 MG: 40 TABLET, FILM COATED ORAL at 09:09

## 2022-01-01 RX ADMIN — IPRATROPIUM BROMIDE AND ALBUTEROL SULFATE 3 ML: 2.5; .5 SOLUTION RESPIRATORY (INHALATION) at 03:09

## 2022-01-01 RX ADMIN — IPRATROPIUM BROMIDE AND ALBUTEROL SULFATE 3 ML: 2.5; .5 SOLUTION RESPIRATORY (INHALATION) at 01:10

## 2022-01-01 RX ADMIN — APIXABAN 5 MG: 5 TABLET, FILM COATED ORAL at 09:12

## 2022-01-01 RX ADMIN — IPRATROPIUM BROMIDE AND ALBUTEROL SULFATE 3 ML: 2.5; .5 SOLUTION RESPIRATORY (INHALATION) at 06:12

## 2022-01-01 RX ADMIN — FUROSEMIDE 40 MG: 40 TABLET ORAL at 08:12

## 2022-01-01 RX ADMIN — ENOXAPARIN SODIUM 40 MG: 40 INJECTION SUBCUTANEOUS at 04:09

## 2022-01-01 RX ADMIN — METOPROLOL TARTRATE 50 MG: 50 TABLET, FILM COATED ORAL at 10:12

## 2022-01-01 RX ADMIN — GEMCITABINE HYDROCHLORIDE 2000 MG: 2 INJECTION, SOLUTION INTRAVENOUS at 03:11

## 2022-01-01 RX ADMIN — HEPARIN 500 UNITS: 100 SYRINGE at 03:11

## 2022-01-01 RX ADMIN — IOHEXOL 75 ML: 350 INJECTION, SOLUTION INTRAVENOUS at 05:09

## 2022-01-01 RX ADMIN — AMOXICILLIN AND CLAVULANATE POTASSIUM 1 TABLET: 875; 125 TABLET, FILM COATED ORAL at 12:09

## 2022-01-01 RX ADMIN — ATORVASTATIN CALCIUM 80 MG: 40 TABLET, FILM COATED ORAL at 09:10

## 2022-01-01 RX ADMIN — IPRATROPIUM BROMIDE AND ALBUTEROL SULFATE 3 ML: 2.5; .5 SOLUTION RESPIRATORY (INHALATION) at 04:10

## 2022-01-01 RX ADMIN — AMOXICILLIN AND CLAVULANATE POTASSIUM 1 TABLET: 875; 125 TABLET, FILM COATED ORAL at 09:09

## 2022-01-01 RX ADMIN — VANCOMYCIN HYDROCHLORIDE 1250 MG: 1.25 INJECTION, POWDER, LYOPHILIZED, FOR SOLUTION INTRAVENOUS at 10:09

## 2022-01-01 RX ADMIN — DOCUSATE SODIUM 100 MG: 100 CAPSULE, LIQUID FILLED ORAL at 05:09

## 2022-01-01 RX ADMIN — Medication 6 MG: at 09:12

## 2022-01-01 RX ADMIN — IPRATROPIUM BROMIDE AND ALBUTEROL SULFATE 3 ML: 2.5; .5 SOLUTION RESPIRATORY (INHALATION) at 03:11

## 2022-01-01 RX ADMIN — VANCOMYCIN HYDROCHLORIDE 1250 MG: 1.25 INJECTION, POWDER, LYOPHILIZED, FOR SOLUTION INTRAVENOUS at 06:10

## 2022-01-01 RX ADMIN — HEPARIN SODIUM 5000 UNITS: 5000 INJECTION INTRAVENOUS; SUBCUTANEOUS at 11:09

## 2022-01-01 RX ADMIN — IPRATROPIUM BROMIDE AND ALBUTEROL SULFATE 3 ML: 2.5; .5 SOLUTION RESPIRATORY (INHALATION) at 02:10

## 2022-01-01 RX ADMIN — AMOXICILLIN AND CLAVULANATE POTASSIUM 1 TABLET: 875; 125 TABLET, FILM COATED ORAL at 09:10

## 2022-01-01 RX ADMIN — Medication 6 MG: at 08:11

## 2022-01-01 RX ADMIN — FUROSEMIDE 40 MG: 10 INJECTION, SOLUTION INTRAMUSCULAR; INTRAVENOUS at 11:09

## 2022-01-01 RX ADMIN — CILOSTAZOL 100 MG: 100 TABLET ORAL at 08:12

## 2022-01-01 RX ADMIN — METOPROLOL TARTRATE 50 MG: 50 TABLET, FILM COATED ORAL at 08:12

## 2022-01-01 RX ADMIN — ATORVASTATIN CALCIUM 80 MG: 40 TABLET, FILM COATED ORAL at 08:11

## 2022-01-01 RX ADMIN — METOPROLOL SUCCINATE 50 MG: 50 TABLET, EXTENDED RELEASE ORAL at 09:10

## 2022-01-01 RX ADMIN — CLOPIDOGREL 75 MG: 75 TABLET, FILM COATED ORAL at 09:09

## 2022-01-01 RX ADMIN — ASPIRIN 81 MG CHEWABLE TABLET 81 MG: 81 TABLET CHEWABLE at 08:10

## 2022-01-01 RX ADMIN — CLOPIDOGREL BISULFATE 75 MG: 75 TABLET ORAL at 08:12

## 2022-01-01 RX ADMIN — FUROSEMIDE 40 MG: 40 TABLET ORAL at 09:11

## 2022-01-01 RX ADMIN — SODIUM CHLORIDE: 0.9 INJECTION, SOLUTION INTRAVENOUS at 08:09

## 2022-01-01 RX ADMIN — PREDNISONE 40 MG: 20 TABLET ORAL at 08:12

## 2022-01-01 RX ADMIN — QUETIAPINE FUMARATE 25 MG: 25 TABLET ORAL at 09:11

## 2022-01-01 RX ADMIN — FUROSEMIDE 40 MG: 40 TABLET ORAL at 11:11

## 2022-01-01 RX ADMIN — SODIUM CHLORIDE 1000 ML: 0.9 INJECTION, SOLUTION INTRAVENOUS at 03:11

## 2022-01-01 RX ADMIN — IPRATROPIUM BROMIDE AND ALBUTEROL SULFATE 3 ML: 2.5; .5 SOLUTION RESPIRATORY (INHALATION) at 09:09

## 2022-01-01 RX ADMIN — IPRATROPIUM BROMIDE AND ALBUTEROL SULFATE 3 ML: .5; 3 SOLUTION RESPIRATORY (INHALATION) at 12:12

## 2022-01-01 RX ADMIN — APIXABAN 5 MG: 5 TABLET, FILM COATED ORAL at 11:11

## 2022-01-01 RX ADMIN — GEMCITABINE HYDROCHLORIDE 2000 MG: 2 INJECTION, SOLUTION INTRAVENOUS at 01:09

## 2022-01-01 RX ADMIN — CEFTRIAXONE 1 G: 1 INJECTION, SOLUTION INTRAVENOUS at 05:09

## 2022-01-01 RX ADMIN — HEPARIN SODIUM 5000 UNITS: 5000 INJECTION INTRAVENOUS; SUBCUTANEOUS at 05:10

## 2022-01-01 RX ADMIN — IPRATROPIUM BROMIDE AND ALBUTEROL SULFATE 3 ML: 2.5; .5 SOLUTION RESPIRATORY (INHALATION) at 08:12

## 2022-01-01 RX ADMIN — LOSARTAN POTASSIUM 100 MG: 50 TABLET, FILM COATED ORAL at 09:09

## 2022-01-01 RX ADMIN — METOPROLOL TARTRATE 50 MG: 50 TABLET, FILM COATED ORAL at 06:12

## 2022-01-01 RX ADMIN — CILOSTAZOL 100 MG: 50 TABLET ORAL at 09:10

## 2022-01-01 RX ADMIN — METOPROLOL TARTRATE 50 MG: 50 TABLET, FILM COATED ORAL at 11:12

## 2022-01-01 RX ADMIN — HEPARIN SODIUM 5000 UNITS: 5000 INJECTION INTRAVENOUS; SUBCUTANEOUS at 09:10

## 2022-01-01 RX ADMIN — IPRATROPIUM BROMIDE AND ALBUTEROL SULFATE 3 ML: 2.5; .5 SOLUTION RESPIRATORY (INHALATION) at 12:10

## 2022-01-01 RX ADMIN — CILOSTAZOL 100 MG: 100 TABLET ORAL at 09:09

## 2022-01-01 RX ADMIN — ONDANSETRON 8 MG: 2 INJECTION INTRAMUSCULAR; INTRAVENOUS at 11:10

## 2022-01-01 RX ADMIN — HEPARIN SODIUM 5000 UNITS: 5000 INJECTION INTRAVENOUS; SUBCUTANEOUS at 02:10

## 2022-01-01 RX ADMIN — IPRATROPIUM BROMIDE AND ALBUTEROL SULFATE 3 ML: .5; 3 SOLUTION RESPIRATORY (INHALATION) at 01:09

## 2022-01-01 RX ADMIN — NITROGLYCERIN 1 INCH: 20 OINTMENT TOPICAL at 10:09

## 2022-01-01 RX ADMIN — METOROPROLOL TARTRATE 5 MG: 5 INJECTION, SOLUTION INTRAVENOUS at 12:12

## 2022-01-01 RX ADMIN — IPRATROPIUM BROMIDE AND ALBUTEROL SULFATE 3 ML: 2.5; .5 SOLUTION RESPIRATORY (INHALATION) at 05:12

## 2022-01-01 RX ADMIN — VANCOMYCIN HYDROCHLORIDE 1250 MG: 1.25 INJECTION, POWDER, LYOPHILIZED, FOR SOLUTION INTRAVENOUS at 06:12

## 2022-01-01 RX ADMIN — IPRATROPIUM BROMIDE AND ALBUTEROL SULFATE 3 ML: .5; 3 SOLUTION RESPIRATORY (INHALATION) at 08:09

## 2022-01-01 RX ADMIN — METOPROLOL SUCCINATE 50 MG: 50 TABLET, EXTENDED RELEASE ORAL at 10:11

## 2022-01-01 RX ADMIN — MAGNESIUM SULFATE HEPTAHYDRATE 2 G: 40 INJECTION, SOLUTION INTRAVENOUS at 07:10

## 2022-01-01 RX ADMIN — AMLODIPINE BESYLATE 10 MG: 5 TABLET ORAL at 09:09

## 2022-01-01 RX ADMIN — IOHEXOL 75 ML: 350 INJECTION, SOLUTION INTRAVENOUS at 03:12

## 2022-01-01 RX ADMIN — ATORVASTATIN CALCIUM 80 MG: 40 TABLET, FILM COATED ORAL at 08:09

## 2022-01-01 RX ADMIN — AMLODIPINE BESYLATE 10 MG: 5 TABLET ORAL at 08:12

## 2022-01-01 RX ADMIN — POTASSIUM CHLORIDE 30 MEQ: 750 TABLET, EXTENDED RELEASE ORAL at 09:11

## 2022-01-01 RX ADMIN — POTASSIUM CHLORIDE 40 MEQ: 1500 TABLET, EXTENDED RELEASE ORAL at 07:10

## 2022-01-01 RX ADMIN — ATORVASTATIN CALCIUM 80 MG: 40 TABLET, FILM COATED ORAL at 10:12

## 2022-01-01 RX ADMIN — FUROSEMIDE 40 MG: 40 TABLET ORAL at 05:12

## 2022-01-01 RX ADMIN — CLOPIDOGREL BISULFATE 75 MG: 75 TABLET, FILM COATED ORAL at 09:09

## 2022-01-01 RX ADMIN — IPRATROPIUM BROMIDE AND ALBUTEROL SULFATE 3 ML: 2.5; .5 SOLUTION RESPIRATORY (INHALATION) at 11:09

## 2022-01-01 RX ADMIN — PREDNISONE 40 MG: 20 TABLET ORAL at 09:11

## 2022-01-01 RX ADMIN — CARVEDILOL 6.25 MG: 6.25 TABLET, FILM COATED ORAL at 11:09

## 2022-01-01 RX ADMIN — PIPERACILLIN AND TAZOBACTAM 4.5 G: 4; .5 INJECTION, POWDER, LYOPHILIZED, FOR SOLUTION INTRAVENOUS; PARENTERAL at 10:12

## 2022-01-01 RX ADMIN — VANCOMYCIN HYDROCHLORIDE 1000 MG: 1 INJECTION, POWDER, LYOPHILIZED, FOR SOLUTION INTRAVENOUS at 11:09

## 2022-01-01 RX ADMIN — IPRATROPIUM BROMIDE AND ALBUTEROL SULFATE 3 ML: 2.5; .5 SOLUTION RESPIRATORY (INHALATION) at 05:09

## 2022-01-01 RX ADMIN — METOPROLOL TARTRATE 50 MG: 50 TABLET, FILM COATED ORAL at 02:12

## 2022-01-01 RX ADMIN — IPRATROPIUM BROMIDE AND ALBUTEROL SULFATE 3 ML: 2.5; .5 SOLUTION RESPIRATORY (INHALATION) at 02:11

## 2022-01-01 RX ADMIN — METOROPROLOL TARTRATE 5 MG: 5 INJECTION, SOLUTION INTRAVENOUS at 03:10

## 2022-01-01 RX ADMIN — POLYETHYLENE GLYCOL 3350 17 G: 17 POWDER, FOR SOLUTION ORAL at 09:12

## 2022-01-01 RX ADMIN — FAMOTIDINE 20 MG: 10 INJECTION INTRAVENOUS at 09:09

## 2022-01-01 RX ADMIN — APIXABAN 5 MG: 5 TABLET, FILM COATED ORAL at 09:11

## 2022-01-01 RX ADMIN — IOHEXOL 100 ML: 350 INJECTION, SOLUTION INTRAVENOUS at 12:11

## 2022-01-01 RX ADMIN — APIXABAN 5 MG: 5 TABLET, FILM COATED ORAL at 11:12

## 2022-01-01 RX ADMIN — DOXYCYCLINE HYCLATE 100 MG: 100 TABLET, COATED ORAL at 09:12

## 2022-01-01 RX ADMIN — ATORVASTATIN CALCIUM 80 MG: 40 TABLET, FILM COATED ORAL at 10:10

## 2022-01-01 RX ADMIN — ASPIRIN 81 MG CHEWABLE TABLET 81 MG: 81 TABLET CHEWABLE at 09:10

## 2022-01-01 RX ADMIN — ASPIRIN 81 MG CHEWABLE TABLET 81 MG: 81 TABLET CHEWABLE at 10:10

## 2022-01-01 RX ADMIN — IPRATROPIUM BROMIDE AND ALBUTEROL SULFATE 3 ML: 2.5; .5 SOLUTION RESPIRATORY (INHALATION) at 01:09

## 2022-01-01 RX ADMIN — HYDROCODONE BITARTRATE AND ACETAMINOPHEN 1 TABLET: 5; 325 TABLET ORAL at 10:11

## 2022-01-01 RX ADMIN — PREDNISONE 40 MG: 20 TABLET ORAL at 08:11

## 2022-01-01 RX ADMIN — METOROPROLOL TARTRATE 5 MG: 5 INJECTION, SOLUTION INTRAVENOUS at 06:12

## 2022-01-01 RX ADMIN — FUROSEMIDE 80 MG: 10 INJECTION, SOLUTION INTRAMUSCULAR; INTRAVENOUS at 11:09

## 2022-01-01 RX ADMIN — HEPARIN 500 UNITS: 100 SYRINGE at 12:10

## 2022-01-01 RX ADMIN — METOPROLOL TARTRATE 50 MG: 50 TABLET, FILM COATED ORAL at 04:12

## 2022-01-01 RX ADMIN — POTASSIUM CHLORIDE 40 MEQ: 1500 TABLET, EXTENDED RELEASE ORAL at 08:12

## 2022-01-01 RX ADMIN — METHYLPREDNISOLONE SODIUM SUCCINATE 80 MG: 125 INJECTION, POWDER, FOR SOLUTION INTRAMUSCULAR; INTRAVENOUS at 11:11

## 2022-01-01 RX ADMIN — APIXABAN 5 MG: 5 TABLET, FILM COATED ORAL at 10:10

## 2022-01-01 RX ADMIN — POTASSIUM CHLORIDE 60 MEQ: 1500 TABLET, EXTENDED RELEASE ORAL at 11:11

## 2022-01-01 RX ADMIN — IPRATROPIUM BROMIDE AND ALBUTEROL SULFATE 3 ML: 2.5; .5 SOLUTION RESPIRATORY (INHALATION) at 04:09

## 2022-01-01 RX ADMIN — ONDANSETRON 8 MG: 2 INJECTION INTRAMUSCULAR; INTRAVENOUS at 01:12

## 2022-01-01 RX ADMIN — IPRATROPIUM BROMIDE AND ALBUTEROL SULFATE 3 ML: .5; 3 SOLUTION RESPIRATORY (INHALATION) at 12:09

## 2022-01-01 RX ADMIN — IPRATROPIUM BROMIDE AND ALBUTEROL SULFATE 3 ML: 2.5; .5 SOLUTION RESPIRATORY (INHALATION) at 12:11

## 2022-01-01 RX ADMIN — IPRATROPIUM BROMIDE AND ALBUTEROL SULFATE 3 ML: 2.5; .5 SOLUTION RESPIRATORY (INHALATION) at 10:11

## 2022-01-01 RX ADMIN — POTASSIUM CHLORIDE 50 MEQ: 750 TABLET, EXTENDED RELEASE ORAL at 09:11

## 2022-01-01 RX ADMIN — HEPARIN SODIUM 5000 UNITS: 5000 INJECTION INTRAVENOUS; SUBCUTANEOUS at 05:09

## 2022-01-01 RX ADMIN — MAGNESIUM SULFATE HEPTAHYDRATE 2 G: 40 INJECTION, SOLUTION INTRAVENOUS at 11:12

## 2022-01-01 RX ADMIN — METOPROLOL SUCCINATE 50 MG: 50 TABLET, EXTENDED RELEASE ORAL at 02:09

## 2022-01-01 RX ADMIN — FUROSEMIDE 40 MG: 40 TABLET ORAL at 08:10

## 2022-01-01 RX ADMIN — POTASSIUM CHLORIDE 40 MEQ: 1500 TABLET, EXTENDED RELEASE ORAL at 01:12

## 2022-01-01 RX ADMIN — MAGNESIUM SULFATE HEPTAHYDRATE 2 G: 40 INJECTION, SOLUTION INTRAVENOUS at 08:12

## 2022-01-01 RX ADMIN — SODIUM CHLORIDE 500 ML: 0.9 INJECTION, SOLUTION INTRAVENOUS at 02:12

## 2022-01-01 RX ADMIN — LEVALBUTEROL HYDROCHLORIDE 3.75 MG: 1.25 SOLUTION RESPIRATORY (INHALATION) at 11:09

## 2022-01-01 RX ADMIN — AMLODIPINE BESYLATE 10 MG: 5 TABLET ORAL at 12:12

## 2022-01-01 RX ADMIN — CEFTRIAXONE SODIUM 2 G: 2 INJECTION, SOLUTION INTRAVENOUS at 07:12

## 2022-01-01 RX ADMIN — FUROSEMIDE 40 MG: 10 INJECTION, SOLUTION INTRAMUSCULAR; INTRAVENOUS at 09:12

## 2022-01-01 RX ADMIN — ATORVASTATIN CALCIUM 80 MG: 40 TABLET, FILM COATED ORAL at 09:12

## 2022-01-01 RX ADMIN — CILOSTAZOL 100 MG: 100 TABLET ORAL at 09:12

## 2022-01-01 RX ADMIN — FUROSEMIDE 40 MG: 40 TABLET ORAL at 12:12

## 2022-01-01 RX ADMIN — VANCOMYCIN HYDROCHLORIDE 1250 MG: 1.25 INJECTION, POWDER, LYOPHILIZED, FOR SOLUTION INTRAVENOUS at 11:09

## 2022-01-01 RX ADMIN — IPRATROPIUM BROMIDE AND ALBUTEROL SULFATE 3 ML: 2.5; .5 SOLUTION RESPIRATORY (INHALATION) at 10:10

## 2022-01-01 RX ADMIN — HEPARIN SODIUM 5000 UNITS: 5000 INJECTION INTRAVENOUS; SUBCUTANEOUS at 03:09

## 2022-01-01 RX ADMIN — APIXABAN 5 MG: 5 TABLET, FILM COATED ORAL at 10:11

## 2022-01-01 RX ADMIN — IPRATROPIUM BROMIDE AND ALBUTEROL SULFATE 3 ML: 2.5; .5 SOLUTION RESPIRATORY (INHALATION) at 01:11

## 2022-01-01 RX ADMIN — IPRATROPIUM BROMIDE 1 MG: 0.5 SOLUTION RESPIRATORY (INHALATION) at 11:09

## 2022-01-01 RX ADMIN — CILOSTAZOL 100 MG: 50 TABLET ORAL at 11:09

## 2022-01-01 RX ADMIN — ONDANSETRON 8 MG: 2 INJECTION INTRAMUSCULAR; INTRAVENOUS at 01:09

## 2022-01-01 RX ADMIN — FUROSEMIDE 40 MG: 40 TABLET ORAL at 10:12

## 2022-01-01 RX ADMIN — Medication 10 ML: at 02:12

## 2022-01-01 RX ADMIN — ASPIRIN 81 MG CHEWABLE TABLET 81 MG: 81 TABLET CHEWABLE at 12:12

## 2022-01-01 RX ADMIN — CEFTRIAXONE SODIUM 2 G: 2 INJECTION, POWDER, FOR SOLUTION INTRAMUSCULAR; INTRAVENOUS at 11:12

## 2022-01-01 RX ADMIN — Medication 10 ML: at 03:11

## 2022-01-01 RX ADMIN — APIXABAN 5 MG: 5 TABLET, FILM COATED ORAL at 09:10

## 2022-01-01 RX ADMIN — CILOSTAZOL 100 MG: 100 TABLET ORAL at 10:11

## 2022-01-01 RX ADMIN — DOXYCYCLINE HYCLATE 100 MG: 100 TABLET, COATED ORAL at 10:12

## 2022-01-01 RX ADMIN — IPRATROPIUM BROMIDE AND ALBUTEROL SULFATE 3 ML: 2.5; .5 SOLUTION RESPIRATORY (INHALATION) at 02:12

## 2022-01-01 RX ADMIN — Medication 6 MG: at 11:12

## 2022-01-01 RX ADMIN — MAGNESIUM SULFATE HEPTAHYDRATE: 500 INJECTION, SOLUTION INTRAMUSCULAR; INTRAVENOUS at 01:12

## 2022-01-01 RX ADMIN — METOPROLOL TARTRATE 50 MG: 50 TABLET, FILM COATED ORAL at 12:12

## 2022-01-01 RX ADMIN — AMLODIPINE BESYLATE 10 MG: 5 TABLET ORAL at 08:09

## 2022-01-01 RX ADMIN — ASPIRIN 81 MG CHEWABLE TABLET 81 MG: 81 TABLET CHEWABLE at 02:12

## 2022-01-01 RX ADMIN — LEVOFLOXACIN 750 MG: 750 TABLET, FILM COATED ORAL at 08:12

## 2022-01-01 RX ADMIN — APIXABAN 5 MG: 5 TABLET, FILM COATED ORAL at 12:12

## 2022-01-01 RX ADMIN — HEPARIN SODIUM 5000 UNITS: 5000 INJECTION INTRAVENOUS; SUBCUTANEOUS at 02:09

## 2022-01-01 RX ADMIN — ASPIRIN 325 MG ORAL TABLET 325 MG: 325 PILL ORAL at 04:09

## 2022-01-01 RX ADMIN — CILOSTAZOL 100 MG: 50 TABLET ORAL at 08:09

## 2022-01-01 RX ADMIN — AMLODIPINE BESYLATE 10 MG: 5 TABLET ORAL at 09:12

## 2022-01-01 RX ADMIN — PREDNISONE 40 MG: 20 TABLET ORAL at 08:09

## 2022-01-01 RX ADMIN — METOROPROLOL TARTRATE 5 MG: 5 INJECTION, SOLUTION INTRAVENOUS at 12:09

## 2022-01-01 RX ADMIN — METOPROLOL SUCCINATE 50 MG: 50 TABLET, EXTENDED RELEASE ORAL at 11:10

## 2022-01-01 RX ADMIN — AMLODIPINE BESYLATE 10 MG: 5 TABLET ORAL at 04:09

## 2022-01-01 RX ADMIN — FLUTICASONE FUROATE AND VILANTEROL TRIFENATATE 1 PUFF: 100; 25 POWDER RESPIRATORY (INHALATION) at 08:09

## 2022-01-01 RX ADMIN — CLOPIDOGREL 75 MG: 75 TABLET, FILM COATED ORAL at 08:10

## 2022-01-01 RX ADMIN — GEMCITABINE HYDROCHLORIDE 2000 MG: 2 INJECTION, SOLUTION INTRAVENOUS at 02:12

## 2022-01-01 RX ADMIN — METHYLPREDNISOLONE SODIUM SUCCINATE 125 MG: 125 INJECTION, POWDER, FOR SOLUTION INTRAMUSCULAR; INTRAVENOUS at 11:09

## 2022-01-01 RX ADMIN — FUROSEMIDE 40 MG: 40 TABLET ORAL at 06:10

## 2022-01-01 RX ADMIN — VANCOMYCIN HYDROCHLORIDE 1750 MG: 500 INJECTION, POWDER, LYOPHILIZED, FOR SOLUTION INTRAVENOUS at 11:09

## 2022-01-01 RX ADMIN — FUROSEMIDE 40 MG: 40 TABLET ORAL at 05:10

## 2022-01-01 RX ADMIN — PREDNISONE 40 MG: 20 TABLET ORAL at 05:12

## 2022-01-01 RX ADMIN — CLOPIDOGREL BISULFATE 75 MG: 75 TABLET, FILM COATED ORAL at 04:09

## 2022-01-01 RX ADMIN — ASPIRIN 81 MG 81 MG: 81 TABLET ORAL at 04:09

## 2022-01-01 RX ADMIN — IPRATROPIUM BROMIDE AND ALBUTEROL SULFATE 3 ML: .5; 3 SOLUTION RESPIRATORY (INHALATION) at 02:09

## 2022-01-01 RX ADMIN — IPRATROPIUM BROMIDE AND ALBUTEROL SULFATE 3 ML: 2.5; .5 SOLUTION RESPIRATORY (INHALATION) at 11:10

## 2022-01-01 RX ADMIN — ASPIRIN 81 MG 81 MG: 81 TABLET ORAL at 09:09

## 2022-01-01 RX ADMIN — VANCOMYCIN HYDROCHLORIDE 1000 MG: 1 INJECTION, POWDER, LYOPHILIZED, FOR SOLUTION INTRAVENOUS at 09:12

## 2022-01-01 RX ADMIN — CLOPIDOGREL 75 MG: 75 TABLET, FILM COATED ORAL at 09:10

## 2022-01-01 RX ADMIN — QUETIAPINE FUMARATE 12.5 MG: 25 TABLET ORAL at 08:11

## 2022-01-01 RX ADMIN — LEVOFLOXACIN 750 MG: 750 TABLET, FILM COATED ORAL at 09:12

## 2022-01-01 RX ADMIN — METOPROLOL TARTRATE 25 MG: 25 TABLET, FILM COATED ORAL at 10:12

## 2022-01-01 RX ADMIN — CLOPIDOGREL BISULFATE 75 MG: 75 TABLET ORAL at 12:12

## 2022-01-01 RX ADMIN — PREDNISONE 40 MG: 20 TABLET ORAL at 11:09

## 2022-01-01 RX ADMIN — VANCOMYCIN HYDROCHLORIDE 2000 MG: 500 INJECTION, POWDER, LYOPHILIZED, FOR SOLUTION INTRAVENOUS at 07:12

## 2022-01-01 RX ADMIN — ONDANSETRON 8 MG: 2 INJECTION INTRAMUSCULAR; INTRAVENOUS at 02:11

## 2022-01-01 RX ADMIN — HEPARIN 500 UNITS: 100 SYRINGE at 02:12

## 2022-01-01 RX ADMIN — FUROSEMIDE 80 MG: 10 INJECTION, SOLUTION INTRAMUSCULAR; INTRAVENOUS at 10:09

## 2022-01-01 RX ADMIN — METOPROLOL TARTRATE 25 MG: 25 TABLET, FILM COATED ORAL at 11:12

## 2022-01-01 RX ADMIN — SODIUM CHLORIDE AND POTASSIUM CHLORIDE: .9; .15 SOLUTION INTRAVENOUS at 01:09

## 2022-01-01 RX ADMIN — LOSARTAN POTASSIUM 100 MG: 50 TABLET, FILM COATED ORAL at 04:09

## 2022-01-01 RX ADMIN — VANCOMYCIN HYDROCHLORIDE 1000 MG: 1 INJECTION, POWDER, LYOPHILIZED, FOR SOLUTION INTRAVENOUS at 06:12

## 2022-01-01 RX ADMIN — FUROSEMIDE 40 MG: 10 INJECTION, SOLUTION INTRAMUSCULAR; INTRAVENOUS at 10:12

## 2022-01-01 RX ADMIN — GEMCITABINE HYDROCHLORIDE 2000 MG: 2 INJECTION, SOLUTION INTRAVENOUS at 12:10

## 2022-01-01 RX ADMIN — Medication 6 MG: at 08:09

## 2022-01-01 RX ADMIN — IPRATROPIUM BROMIDE AND ALBUTEROL SULFATE 3 ML: 2.5; .5 SOLUTION RESPIRATORY (INHALATION) at 05:11

## 2022-01-01 RX ADMIN — METOPROLOL TARTRATE 25 MG: 25 TABLET, FILM COATED ORAL at 02:12

## 2022-01-01 RX ADMIN — PREDNISONE 50 MG: 50 TABLET ORAL at 08:11

## 2022-01-01 RX ADMIN — HEPARIN SODIUM 5000 UNITS: 5000 INJECTION INTRAVENOUS; SUBCUTANEOUS at 09:09

## 2022-01-01 RX ADMIN — LOSARTAN POTASSIUM 100 MG: 25 TABLET, FILM COATED ORAL at 09:09

## 2022-01-01 RX ADMIN — LORAZEPAM 0.5 MG: 2 INJECTION, SOLUTION INTRAMUSCULAR; INTRAVENOUS at 10:09

## 2022-01-01 RX ADMIN — ATORVASTATIN CALCIUM 80 MG: 40 TABLET, FILM COATED ORAL at 12:12

## 2022-01-01 RX ADMIN — HEPARIN SODIUM 5000 UNITS: 5000 INJECTION INTRAVENOUS; SUBCUTANEOUS at 10:09

## 2022-01-01 RX ADMIN — Medication 6 MG: at 10:11

## 2022-01-01 RX ADMIN — POTASSIUM CHLORIDE 40 MEQ: 1500 TABLET, EXTENDED RELEASE ORAL at 02:12

## 2022-01-01 RX ADMIN — CILOSTAZOL 100 MG: 100 TABLET ORAL at 12:12

## 2022-01-01 RX ADMIN — Medication 6 MG: at 01:12

## 2022-01-01 RX ADMIN — LOSARTAN POTASSIUM 100 MG: 50 TABLET, FILM COATED ORAL at 08:09

## 2022-01-01 RX ADMIN — METOPROLOL SUCCINATE 50 MG: 50 TABLET, EXTENDED RELEASE ORAL at 11:11

## 2022-01-01 RX ADMIN — FUROSEMIDE 40 MG: 40 TABLET ORAL at 10:11

## 2022-01-01 RX ADMIN — HEPARIN 500 UNITS: 100 SYRINGE at 05:11

## 2022-01-01 RX ADMIN — METOPROLOL TARTRATE 25 MG: 25 TABLET, FILM COATED ORAL at 09:12

## 2022-01-01 RX ADMIN — FUROSEMIDE 40 MG: 10 INJECTION, SOLUTION INTRAVENOUS at 10:09

## 2022-01-01 RX ADMIN — GUAIFENESIN 1200 MG: 600 TABLET, EXTENDED RELEASE ORAL at 10:12

## 2022-01-01 RX ADMIN — IOHEXOL 100 ML: 350 INJECTION, SOLUTION INTRAVENOUS at 07:11

## 2022-01-01 RX ADMIN — METOROPROLOL TARTRATE 2.5 MG: 5 INJECTION, SOLUTION INTRAVENOUS at 05:09

## 2022-01-01 RX ADMIN — FAMOTIDINE 20 MG: 10 INJECTION INTRAVENOUS at 05:09

## 2022-01-01 RX ADMIN — METOROPROLOL TARTRATE 5 MG: 5 INJECTION, SOLUTION INTRAVENOUS at 11:10

## 2022-01-01 RX ADMIN — VANCOMYCIN HYDROCHLORIDE 1250 MG: 1.25 INJECTION, POWDER, LYOPHILIZED, FOR SOLUTION INTRAVENOUS at 08:12

## 2022-01-01 RX ADMIN — ACETAMINOPHEN 650 MG: 325 TABLET ORAL at 04:09

## 2022-01-05 ENCOUNTER — LAB VISIT (OUTPATIENT)
Dept: LAB | Facility: HOSPITAL | Age: 62
End: 2022-01-05
Attending: INTERNAL MEDICINE

## 2022-01-05 ENCOUNTER — HOSPITAL ENCOUNTER (OUTPATIENT)
Dept: RADIOLOGY | Facility: HOSPITAL | Age: 62
Discharge: HOME | End: 2022-01-05
Attending: STUDENT IN AN ORGANIZED HEALTH CARE EDUCATION/TRAINING PROGRAM

## 2022-01-05 DIAGNOSIS — C34.02 LUNG CANCER, MAIN BRONCHUS, LEFT: ICD-10-CM

## 2022-01-05 LAB
ALBUMIN SERPL BCP-MCNC: 3.2 G/DL (ref 3.5–5.2)
ALP SERPL-CCNC: 73 U/L (ref 55–135)
ALT SERPL W/O P-5'-P-CCNC: 55 U/L (ref 10–44)
ANION GAP SERPL CALC-SCNC: 8 MMOL/L (ref 8–16)
AST SERPL-CCNC: 48 U/L (ref 10–40)
BILIRUB SERPL-MCNC: 0.4 MG/DL (ref 0.1–1)
BUN SERPL-MCNC: 7 MG/DL (ref 8–23)
CALCIUM SERPL-MCNC: 8.9 MG/DL (ref 8.7–10.5)
CHLORIDE SERPL-SCNC: 106 MMOL/L (ref 95–110)
CO2 SERPL-SCNC: 28 MMOL/L (ref 23–29)
CREAT SERPL-MCNC: 0.7 MG/DL (ref 0.5–1.4)
ERYTHROCYTE [DISTWIDTH] IN BLOOD BY AUTOMATED COUNT: 14.1 % (ref 11.5–14.5)
EST. GFR  (AFRICAN AMERICAN): >60 ML/MIN/1.73 M^2
EST. GFR  (NON AFRICAN AMERICAN): >60 ML/MIN/1.73 M^2
GLUCOSE SERPL-MCNC: 104 MG/DL (ref 70–110)
HCT VFR BLD AUTO: 38.4 % (ref 40–54)
HGB BLD-MCNC: 12.5 G/DL (ref 14–18)
IMM GRANULOCYTES # BLD AUTO: 0.02 K/UL (ref 0–0.04)
MCH RBC QN AUTO: 32.6 PG (ref 27–31)
MCHC RBC AUTO-ENTMCNC: 32.6 G/DL (ref 32–36)
MCV RBC AUTO: 100 FL (ref 82–98)
NEUTROPHILS # BLD AUTO: 1.1 K/UL (ref 1.8–7.7)
PLATELET # BLD AUTO: 93 K/UL (ref 150–450)
PMV BLD AUTO: 11 FL (ref 9.2–12.9)
POTASSIUM SERPL-SCNC: 4.2 MMOL/L (ref 3.5–5.1)
PROT SERPL-MCNC: 7.1 G/DL (ref 6–8.4)
RBC # BLD AUTO: 3.83 M/UL (ref 4.6–6.2)
SODIUM SERPL-SCNC: 142 MMOL/L (ref 136–145)
WBC # BLD AUTO: 2.74 K/UL (ref 3.9–12.7)

## 2022-01-05 PROCEDURE — 36415 COLL VENOUS BLD VENIPUNCTURE: CPT | Performed by: INTERNAL MEDICINE

## 2022-01-05 PROCEDURE — 71260 CT THORAX DX C+: CPT | Mod: TC

## 2022-01-05 PROCEDURE — 74177 CT ABD & PELVIS W/CONTRAST: CPT | Mod: 26,,, | Performed by: INTERNAL MEDICINE

## 2022-01-05 PROCEDURE — 71260 CT CHEST ABDOMEN PELVIS WITH CONTRAST (XPD): ICD-10-PCS | Mod: 26,,, | Performed by: INTERNAL MEDICINE

## 2022-01-05 PROCEDURE — 74177 CT ABD & PELVIS W/CONTRAST: CPT | Mod: TC

## 2022-01-05 PROCEDURE — 74177 CT CHEST ABDOMEN PELVIS WITH CONTRAST (XPD): ICD-10-PCS | Mod: 26,,, | Performed by: INTERNAL MEDICINE

## 2022-01-05 PROCEDURE — 71260 CT THORAX DX C+: CPT | Mod: 26,,, | Performed by: INTERNAL MEDICINE

## 2022-01-05 PROCEDURE — 80053 COMPREHEN METABOLIC PANEL: CPT | Performed by: INTERNAL MEDICINE

## 2022-01-05 PROCEDURE — 85027 COMPLETE CBC AUTOMATED: CPT | Performed by: INTERNAL MEDICINE

## 2022-01-05 PROCEDURE — 25500020 PHARM REV CODE 255: Performed by: STUDENT IN AN ORGANIZED HEALTH CARE EDUCATION/TRAINING PROGRAM

## 2022-01-05 RX ADMIN — IOHEXOL 15 ML: 300 INJECTION, SOLUTION INTRAVENOUS at 01:01

## 2022-01-05 RX ADMIN — IOHEXOL 100 ML: 350 INJECTION, SOLUTION INTRAVENOUS at 01:01

## 2022-01-07 ENCOUNTER — TELEPHONE (OUTPATIENT)
Dept: FAMILY MEDICINE | Facility: CLINIC | Age: 62
End: 2022-01-07

## 2022-01-07 ENCOUNTER — OFFICE VISIT (OUTPATIENT)
Dept: HEMATOLOGY/ONCOLOGY | Facility: CLINIC | Age: 62
End: 2022-01-07

## 2022-01-07 VITALS
HEIGHT: 70 IN | BODY MASS INDEX: 32.26 KG/M2 | WEIGHT: 225.31 LBS | HEART RATE: 84 BPM | DIASTOLIC BLOOD PRESSURE: 86 MMHG | TEMPERATURE: 98 F | OXYGEN SATURATION: 97 % | SYSTOLIC BLOOD PRESSURE: 179 MMHG

## 2022-01-07 DIAGNOSIS — I70.0 ATHEROSCLEROSIS OF AORTA: ICD-10-CM

## 2022-01-07 DIAGNOSIS — D84.821 IMMUNODEFICIENCY DUE TO DRUGS: ICD-10-CM

## 2022-01-07 DIAGNOSIS — Z79.899 IMMUNODEFICIENCY DUE TO DRUGS: ICD-10-CM

## 2022-01-07 DIAGNOSIS — J47.9 BRONCHIECTASIS WITHOUT COMPLICATION: ICD-10-CM

## 2022-01-07 DIAGNOSIS — C79.51 SECONDARY MALIGNANT NEOPLASM OF BONE: ICD-10-CM

## 2022-01-07 DIAGNOSIS — C34.02 LUNG CANCER, MAIN BRONCHUS, LEFT: Primary | ICD-10-CM

## 2022-01-07 DIAGNOSIS — D69.6 THROMBOCYTOPENIA, UNSPECIFIED: ICD-10-CM

## 2022-01-07 PROCEDURE — 99213 OFFICE O/P EST LOW 20 MIN: CPT | Mod: PBBFAC | Performed by: STUDENT IN AN ORGANIZED HEALTH CARE EDUCATION/TRAINING PROGRAM

## 2022-01-07 PROCEDURE — 99999 PR PBB SHADOW E&M-EST. PATIENT-LVL III: CPT | Mod: PBBFAC,,, | Performed by: STUDENT IN AN ORGANIZED HEALTH CARE EDUCATION/TRAINING PROGRAM

## 2022-01-07 PROCEDURE — 99999 PR PBB SHADOW E&M-EST. PATIENT-LVL III: ICD-10-PCS | Mod: PBBFAC,,, | Performed by: STUDENT IN AN ORGANIZED HEALTH CARE EDUCATION/TRAINING PROGRAM

## 2022-01-07 PROCEDURE — 99215 PR OFFICE/OUTPT VISIT, EST, LEVL V, 40-54 MIN: ICD-10-PCS | Mod: S$PBB,,, | Performed by: STUDENT IN AN ORGANIZED HEALTH CARE EDUCATION/TRAINING PROGRAM

## 2022-01-07 PROCEDURE — 99215 OFFICE O/P EST HI 40 MIN: CPT | Mod: S$PBB,,, | Performed by: STUDENT IN AN ORGANIZED HEALTH CARE EDUCATION/TRAINING PROGRAM

## 2022-01-07 RX ORDER — LIDOCAINE AND PRILOCAINE 25; 25 MG/G; MG/G
CREAM TOPICAL
Qty: 30 G | Refills: 2 | Status: ON HOLD | OUTPATIENT
Start: 2022-01-07 | End: 2022-05-22 | Stop reason: HOSPADM

## 2022-01-07 NOTE — Clinical Note
When to follow up: 1/28 Labs needed: 1/28 port draw  labs CBC and Comprehensive Metabolic Panel  Chemotherapy Regimen:  Next Treatment Date Upcoming Treatment Dates - OP NSCLC GEMCITABINE Q3W      Days with orders in the following treatment categories:           Chemotherapy 1/11/2022      gemcitabine (GEMZAR) 2,320 mg in sodium chloride 0.9% 250 mL chemo infusion 1/18/2022      gemcitabine (GEMZAR) 2,320 mg in sodium chloride 0.9% 250 mL chemo infusion 2/1/2022      gemcitabine (GEMZAR) 2,320 mg in sodium chloride 0.9% 250 mL chemo infusion  Provider: Rivas

## 2022-01-07 NOTE — ASSESSMENT & PLAN NOTE
S/p right chest port placement.  1/5/2022 CT scan showing stable disease  -labs next week prior to chemo  -port draw labs and follow up with me prior to next cycle.

## 2022-01-07 NOTE — PROGRESS NOTES
"  PATIENT: Kashif Iverson  MRN: [id number]  DATE: 1/7/2022      Diagnosis:   1. Lung cancer, main bronchus, left    2. Secondary malignant neoplasm of bone    3. Immunodeficiency due to drugs    4. Thrombocytopenia, unspecified    5. Atherosclerosis of aorta    6. Bronchiectasis without complication        Chief Complaint: Lung Cancer      Oncologic History:    Oncologic History 1. 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 Palliative chemoradiation with carboplatin and paclitaxel; anaphylactic reaction to paclitaxel  2. Pembrolizumab  3. Pembrolizumab+ramucirumab (LungMAP trial)  4. Gemcitabine    Pathology Squamous cell carcinoma, no actionable mutations, PD-L1 5%        Subjective:    Interval History: Mr. Iverson returns for follow up.     8/30/2019 underwent bronchoscopy that showed "A partially obstructing (about 80% obstructed) mass was found in the middle portion in the left mainstem bronchus. The mass was large and bloody, circumferential, endobronchial, friable and necrotic. 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  No actionable mutations and PD-L1 5%.  9/19/21 staging PET CT showed "Hypermetabolic left lung mass concerning for primary pulmonary malignancy with metastatic disease involving the right 6th and 9th ribs as well as mediastinal and left supraclavicular lymph nodes."  Stage IV squamous cell carcinoma of the lung at diagnosis.    10/8/2019-10/21/2019 he received palliative chemoradiation for rib pain with carboplatin and paclitaxel.  He received 3 cycles of carboplatin and paclitaxel.  He developed anaphylactic reaction to paclitaxel.  The chest was treated with AP:PA fields and the right 9th rib was treated with anterior and posterior oblique fields at 300 cGy per fraction to 3000 cGy.   Lt chest 6X 300 10 / 10 3,000   Rt 9th rib 6X 300 10 / 10 3,000     10/31/2019-9/10/2020 he " received pembrolizumab (completed 15 cycles, last dose 8/21/2020)  9/10/2020 PET CT showed progression of disease.  He was then referred to Dr. Key for evaluation for clinical trials.  10/20/2020 he underwent repeat biopsy for LUNG MAP trial and was randomized to Ramucirumab + pembrolizumab.    11/17/2020 started ramucirumab+pembrolizumab.  Completed 4 cycles and then 2/10/2021 scans showed progression.    2/24/2021 he was subsequent started on gemcitabine with Dr. Cruz.  His care was transferred to Dr. Romo who continued his current regimen and now is currently under my care.   --    Since his last visit he had a port placed.  No new symptoms to report at this visit.  He is doing ok with chemotherapy so far.    He is alone at this visit.    Past Medical History:   Past Medical History:   Diagnosis Date    Hypertension     Lung cancer     NSCLC    Lung mass     left lung       Past Surgical HIstory:   Past Surgical History:   Procedure Laterality Date    BRONCHOSCOPY N/A 8/30/2019    Procedure: Bronchoscopy;  [facility];  Location: [facility] OR [address] [place] [address];  Service: Anesthesiology;  Laterality: N/A;       Family History:   Family History   Problem Relation Age of Onset    Diabetes Mother     Heart defect Mother     Cancer Father     Cancer Sister     No Known Problems Son        Social History:  reports that he quit smoking about 2 years ago. His smoking use included cigarettes. He has a 25.00 pack-year smoking history. He has never used smokeless tobacco. He reports current alcohol use. He reports that he does not use drugs.    Allergies:  Review of patient's allergies indicates:  No Known Allergies    Medications:  Current Outpatient Medications   Medication Sig Dispense Refill    ascorbic acid-multivit-min (VITAMIN C ENERGY BOOSTER) 1,000 mg PwEP Take by mouth. Patient takes 3,000 mg per day      atenoloL (TENORMIN) 50 MG tablet Take 1 tablet (50 mg total) by mouth once daily. 30  "tablet 11    ondansetron (ZOFRAN) 8 MG tablet Take 1 tablet (8 mg total) by mouth 4 (four) times daily as needed for Nausea. 60 tablet 2    LIDOcaine-prilocaine (EMLA) cream Apply generously to port site 30-60 min prior to chemo and then cover with saran wrap. 30 g 2     No current facility-administered medications for this visit.       Review of Systems   Constitutional: Negative for activity change, appetite change, chills, diaphoresis, fatigue, fever and unexpected weight change.   HENT: Negative for nosebleeds and trouble swallowing.    Eyes: Negative for visual disturbance.   Respiratory: Negative for cough, chest tightness, shortness of breath and wheezing.    Cardiovascular: Negative for chest pain and leg swelling.   Gastrointestinal: Negative for abdominal distention, abdominal pain, blood in stool, constipation, diarrhea, nausea and vomiting.   Endocrine: Negative for cold intolerance and heat intolerance.   Genitourinary: Negative for difficulty urinating and dysuria.   Musculoskeletal: Negative for arthralgias, back pain and myalgias.   Skin: Negative for color change.   Neurological: Negative for dizziness, weakness, light-headedness, numbness and headaches.   Hematological: Negative for adenopathy. Bruises/bleeds easily.   Psychiatric/Behavioral: Negative for confusion.       ECOG Performance Status:     ECOG SCORE    1 - Restricted in strenuous activity-ambulatory and able to carry out work of a light nature         Objective:      Vitals:   Vitals:    01/07/22 1332   BP: (!) 179/86   BP Location: Left arm   Patient Position: Sitting   Pulse: 84   Temp: 97.8 °F (36.6 °C)   TempSrc: Oral   SpO2: 97%   Weight: 102.2 kg (225 lb 5 oz)   Height: 5' 10" (1.778 m)     BMI: Body mass index is 32.33 kg/m².    Physical Exam  Constitutional:       General: He is not in acute distress.     Appearance: Normal appearance. He is not ill-appearing.   HENT:      Head: Normocephalic and atraumatic.      Mouth/Throat: "      Pharynx: No oropharyngeal exudate or posterior oropharyngeal erythema.   Eyes:      General: No scleral icterus.     Extraocular Movements: Extraocular movements intact.      Conjunctiva/sclera: Conjunctivae normal.      Pupils: Pupils are equal, round, and reactive to light.   Cardiovascular:      Rate and Rhythm: Normal rate and regular rhythm.      Heart sounds: No murmur heard.  No friction rub. No gallop.       Comments: Right chest wall port c/d/i  Pulmonary:      Effort: Pulmonary effort is normal. No respiratory distress.      Breath sounds: No stridor. No wheezing, rhonchi or rales.   Abdominal:      General: Bowel sounds are normal. There is no distension.      Palpations: Abdomen is soft. There is no mass.      Tenderness: There is no abdominal tenderness. There is no guarding or rebound.   Musculoskeletal:         General: No swelling or tenderness (negative homans). Normal range of motion.      Cervical back: Normal range of motion and neck supple.      Right lower leg: No edema.      Left lower leg: No edema.   Skin:     General: Skin is warm and dry.   Neurological:      General: No focal deficit present.      Mental Status: He is alert.         Laboratory Data:   Recent Results (from the past 168 hour(s))   Comprehensive Metabolic Panel    Collection Time: 01/05/22 12:35 PM   Result Value Ref Range    Sodium 142 136 - 145 mmol/L    Potassium 4.2 3.5 - 5.1 mmol/L    Chloride 106 95 - 110 mmol/L    CO2 28 23 - 29 mmol/L    Glucose 104 70 - 110 mg/dL    BUN 7 (L) 8 - 23 mg/dL    Creatinine 0.7 0.5 - 1.4 mg/dL    Calcium 8.9 8.7 - 10.5 mg/dL    Total Protein 7.1 6.0 - 8.4 g/dL    Albumin 3.2 (L) 3.5 - 5.2 g/dL    Total Bilirubin 0.4 0.1 - 1.0 mg/dL    Alkaline Phosphatase 73 55 - 135 U/L    AST 48 (H) 10 - 40 U/L    ALT 55 (H) 10 - 44 U/L    Anion Gap 8 8 - 16 mmol/L    eGFR if African American >60 >60 mL/min/1.73 m^2    eGFR if non African American >60 >60 mL/min/1.73 m^2   CBC Oncology     Collection Time: 01/05/22 12:35 PM   Result Value Ref Range    WBC 2.74 (L) 3.90 - 12.70 K/uL    RBC 3.83 (L) 4.60 - 6.20 M/uL    Hemoglobin 12.5 (L) 14.0 - 18.0 g/dL    Hematocrit 38.4 (L) 40.0 - 54.0 %     (H) 82 - 98 fL    MCH 32.6 (H) 27.0 - 31.0 pg    MCHC 32.6 32.0 - 36.0 g/dL    RDW 14.1 11.5 - 14.5 %    Platelets 93 (L) 150 - 450 K/uL    MPV 11.0 9.2 - 12.9 fL    Gran # (ANC) 1.1 (L) 1.8 - 7.7 K/uL    Immature Grans (Abs) 0.02 0.00 - 0.04 K/uL         Imaging:     IR Tunneled Cath Placement With Port    Result Date: 12/27/2021  EXAMINATION: Infusion port placement Procedural Personnel Attending physician(s): Luis Graff Resident physician(s): None Pre-procedure diagnosis: Stage IV squamous cell carcinoma of the left lung Post-procedure diagnosis: Same Complications: No immediate postprocedural complications noted INDICATION: Indication: 61-year-old male with history of stage IV squamous cell carcinoma of the left lung requiring long-term central venous access for chemotherapy. PROCEDURAL SUMMARY: - Real-time ultrasound-guided access of the right internal jugular vein - Fluoroscopic-guided placement of an RIJV-approach infusion port and 28.0-cm catheter PROCEDURE: Pre-procedure: History and imaging of central venous access reviewed (QCDR): Yes Consent: Informed consent for the procedure was obtained and time-out was performed prior to the procedure. Prophylactic antibiotic administered: 2-mg Ancef given prior to procedure. Preparation (MIPS): The site was prepared and draped using all elements of maximal sterile barrier technique including sterile gloves, sterile gown, cap, mask, large sterile sheet, sterile ultrasound probe cover, hand hygiene and cutaneous antisepsis with 2% chlorhexidine. Anesthesia/sedation: Level of anesthesia/sedation: Moderate sedation (conscious sedation) Anesthesia/sedation administered by: Independent trained observer under attending supervision with continuous monitoring  of the patient's level of consciousness and physiologic status. Total intra-service sedation time (minutes): 45 Access: Local anesthesia was administered. The vessel was sonographically evaluated and determined to be patent. Real time ultrasound was used to visualize needle entry into the vessel and a permanent image was stored. Vein accessed: Right internal jugular vein Access technique: Micropuncture set with 21 gauge needle Port placement: An incision was made at the right upper chest, a pocket was created, and the catheter was tunneled subcutaneously to the venous access site and trimmed to 28.0-cm.  The port was inserted into the pocket and the catheter was advanced via a peel-away sheath into the vein under fluoroscopic guidance. The port was not sutured into the pocket. Catheter tip location was fluoroscopically verified and a permanent image was stored. Port placed: Adomos power port Catheter size (Mauritian): 8 Catheter tip position: 2.5 VBUs below jonnathan. Unique Device Identifier: Not available Catheter flush: Heparin (100 units/mL) Closure: The access site and incision were closed and sterile dressing(s) were applied. Access site closure technique: Tissue adhesive Incision closure technique: Absorbable suture and tissue adhesive Patient discharged from procedure suite with device accessed: No Contrast: Contrast agent: None Contrast volume (mL): 0 Radiation Dose: Fluoroscopy time (minutes): 0.6 Dose area product (cGy-cm2): 197.0 Additional Details: Additional description of procedure: None Equipment details: None Specimens removed: None Estimated blood loss (mL): Minimal Standardized report: [email or web address] Attestation Signer name: Luis Graff MD I attest that I was present for the entire procedure. I reviewed the stored images and agree with the report as written.     1. Successful placement of a RIJV-approach single-lumen infusion port and catheter trimmed to 28.0-cm, with catheter tip in the expected location  of the cavoatrial junction. Plan: 1. The infusion port may be utilized immediately. Electronically signed by: Luis Graff Date:    12/27/2021 Time:    10:34    CT Chest Abdomen Pelvis With Contrast    Result Date: 1/5/2022  EXAMINATION: CT CHEST ABDOMEN PELVIS WITH CONTRAST (XPD) CLINICAL HISTORY: metastatic NSCLC, monitor for progression; Malignant neoplasm of left main bronchus TECHNIQUE: Low dose axial images, sagittal and coronal reformations were obtained from the thoracic inlet to the pubic symphysis following the IV administration of 100 mL of Omnipaque 350 and oral contrast. COMPARISON: September 2020 FINDINGS: Note that the timing of the contrast bolus within the abdomen is slightly delayed. There are changes of emphysema again seen within the lungs. There are 2 left lower lobe nodules.  The larger measures 1.7 cm on series 2, image 40.  This compares to 1.9 cm on prior.  The smaller nodule is subcentimeter and stable. There is a left upper lobe mass abutting the mediastinum measuring 6.2 x 3.5 cm on series 2, image 40, as compared to 6.3 x 3.3 cm on prior.  The mass abuts the left main pulmonary artery.  It obliterates the left upper lobe bronchus.  It appears to obliterate the left upper segmental pulmonary artery. Stable scarring in the left lung. A port overlies the right chest with its tip in the right atrium. Area of soft tissue density lateral to the left thyroid is stable measuring 2.9 cm on series 2, image 9.  This compares  2.8 cm on prior. Subcarinal node conglomerate measures 1.5 cm series 2, image 53, unchanged compared to 1.5 cm on prior.  Right hilar node appears unchanged. There is no pericardial or right pleural effusion.  Trace right pleural fluid.  There is calcification along the wall of the aorta and coronary arteries. The liver and spleen demonstrate no focal abnormality.  Stomach is unremarkable.  The pancreas, adrenal glands, gallbladder and biliary system are unremarkable. The  kidneys concentrate and excrete contrast satisfactorily demonstrating no mass or hydronephrosis. The aorta demonstrates calcification and thrombus along its wall along with ectasia, but no aneurysm or periaortic lymphadenopathy.  Aortic branches demonstrate calcification.  There is no pelvic lymphadenopathy. Bladder is incompletely distended with some apparent wall thickening, may relate to incomplete distension.  There is prostatic calcification.  The bowel is unremarkable. The osseous structures show degenerative change.     In this patient with lung cancer, left upper lobe mass and left lower lobe nodule appears similar in size. Soft tissue density lateral to the left thyroid is unchanged. Subcarinal and right hilar node appears stable. Additional incidental findings as above. RECIST SUMMARY: Date of prior examination for comparison: September 24, 2021 Lesion 1: Left upper lobe.  6.2 cm. Series 2 image 40.  Prior measurement 6.3 cm. Lesion 2: Left lower lobe.  1.7 cm. Series 2 image 40.  Prior measurement 1.9 cm. Electronically signed by: Marita Rosen MD Date:    01/05/2022 Time:    14:17    IR Ultrasound Guidance    Result Date: 12/27/2021  EXAMINATION: Infusion port placement Procedural Personnel Attending physician(s): Luis Graff Resident physician(s): None Pre-procedure diagnosis: Stage IV squamous cell carcinoma of the left lung Post-procedure diagnosis: Same Complications: No immediate postprocedural complications noted INDICATION: Indication: 61-year-old male with history of stage IV squamous cell carcinoma of the left lung requiring long-term central venous access for chemotherapy. PROCEDURAL SUMMARY: - Real-time ultrasound-guided access of the right internal jugular vein - Fluoroscopic-guided placement of an RIJV-approach infusion port and 28.0-cm catheter PROCEDURE: Pre-procedure: History and imaging of central venous access reviewed (QCDR): Yes Consent: Informed consent for the procedure was  obtained and time-out was performed prior to the procedure. Prophylactic antibiotic administered: 2-mg Ancef given prior to procedure. Preparation (MIPS): The site was prepared and draped using all elements of maximal sterile barrier technique including sterile gloves, sterile gown, cap, mask, large sterile sheet, sterile ultrasound probe cover, hand hygiene and cutaneous antisepsis with 2% chlorhexidine. Anesthesia/sedation: Level of anesthesia/sedation: Moderate sedation (conscious sedation) Anesthesia/sedation administered by: Independent trained observer under attending supervision with continuous monitoring of the patient's level of consciousness and physiologic status. Total intra-service sedation time (minutes): 45 Access: Local anesthesia was administered. The vessel was sonographically evaluated and determined to be patent. Real time ultrasound was used to visualize needle entry into the vessel and a permanent image was stored. Vein accessed: Right internal jugular vein Access technique: Micropuncture set with 21 gauge needle Port placement: An incision was made at the right upper chest, a pocket was created, and the catheter was tunneled subcutaneously to the venous access site and trimmed to 28.0-cm.  The port was inserted into the pocket and the catheter was advanced via a peel-away sheath into the vein under fluoroscopic guidance. The port was not sutured into the pocket. Catheter tip location was fluoroscopically verified and a permanent image was stored. Port placed: Bard power port Catheter size (Maori): 8 Catheter tip position: 2.5 VBUs below jonnathan. Unique Device Identifier: Not available Catheter flush: Heparin (100 units/mL) Closure: The access site and incision were closed and sterile dressing(s) were applied. Access site closure technique: Tissue adhesive Incision closure technique: Absorbable suture and tissue adhesive Patient discharged from procedure suite with device accessed: No Contrast:  "Contrast agent: None Contrast volume (mL): 0 Radiation Dose: Fluoroscopy time (minutes): 0.6 Dose area product (cGy-cm2): 197.0 Additional Details: Additional description of procedure: None Equipment details: None Specimens removed: None Estimated blood loss (mL): Minimal Standardized report: [email or web address] Attestation Signer name: Luis Graff MD I attest that I was present for the entire procedure. I reviewed the stored images and agree with the report as written.     1. Successful placement of a RIJV-approach single-lumen infusion port and catheter trimmed to 28.0-cm, with catheter tip in the expected location of the cavoatrial junction. Plan: 1. The infusion port may be utilized immediately. Electronically signed by: Luis Graff Date:    12/27/2021 Time:    10:34        Assessment:       1. Lung cancer, main bronchus, left    2. Secondary malignant neoplasm of bone    3. Immunodeficiency due to drugs    4. Thrombocytopenia, unspecified    5. Atherosclerosis of aorta    6. Bronchiectasis without complication           Plan:       Problem List Items Addressed This Visit        Pulmonary    Bronchiectasis without complication    Overview     CT Chest Abdomen Pelvis-9/24/2021   "Bronchiectasis and scarring noted within the left lower lobe."            Current Assessment & Plan     No new respiratory symptoms. No wheezing on exam  -follow up with pcp/pulmonology            Cardiac/Vascular    Atherosclerosis of aorta    Overview     CT Chest Abdomen Pelvis-9/24/2021  "The thoracic aorta is of normal caliber and demonstrates mild atherosclerotic plaque. . . Infrarenal abdominal aorta ectasia with atherosclerotic plaque."             Current Assessment & Plan     Asymptomatic.   -continue with medical management per PCP            Hematology    Thrombocytopenia, unspecified    Current Assessment & Plan     Secondary to chemotherapy, asymptomatic  -monitor cbc            Oncology    Lung cancer, main bronchus, left - " "Primary    Overview     8/30/2019 underwent bronchoscopy that showed "A partially obstructing (about 80% obstructed) mass was found in the middle portion in the left mainstem bronchus. The mass was large and bloody, circumferential, endobronchial, friable and necrotic. 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  No actionable mutations and PD-L1 5%.  9/19/21 staging PET CT showed "Hypermetabolic left lung mass concerning for primary pulmonary malignancy with metastatic disease involving the right 6th and 9th ribs as well as mediastinal and left supraclavicular lymph nodes."  Stage IV squamous cell carcinoma of the lung at diagnosis.    10/8/2019-10/21/2019 he received palliative chemoradiation for rib pain with carboplatin and paclitaxel.  He received 3 cycles of carboplatin and paclitaxel.  He developed anaphylactic reaction to paclitaxel.  The chest was treated with AP:PA fields and the right 9th rib was treated with anterior and posterior oblique fields at 300 cGy per fraction to 3000 cGy.   Lt chest 6X 300 10 / 10 3,000   Rt 9th rib 6X 300 10 / 10 3,000     10/31/2019-9/10/2020 he received pembrolizumab (completed 15 cycles, last dose 8/21/2020)  9/10/2020 PET CT showed progression of disease.  He was then referred to Dr. Key for evaluation for clinical trials.  10/20/2020 he underwent repeat biopsy for LUNG MAP trial and was randomized to Ramucirumab + pembrolizumab.    11/17/2020 started ramucirumab+pembrolizumab.  Completed 4 cycles and then 2/10/2021 scans showed progression.    2/24/2021 he was subsequent started on gemcitabine with Dr. Cruz.         Current Assessment & Plan     S/p right chest port placement.  1/5/2022 CT scan showing stable disease  -labs next week prior to chemo  -port draw labs and follow up with me prior to next cycle.         Relevant Medications    LIDOcaine-prilocaine (EMLA) cream    Other Relevant " Orders    Comprehensive Metabolic Panel    CBC Auto Differential    Secondary malignant neoplasm of bone       Other    Immunodeficiency due to drugs    Current Assessment & Plan     Secondary to chemotherapy  -monitor ANC               Orders Placed This Encounter   Procedures    Comprehensive Metabolic Panel    CBC Auto Differential           Mario Hathaway MD  Hematology Oncology

## 2022-01-11 ENCOUNTER — INFUSION (OUTPATIENT)
Dept: INFUSION THERAPY | Facility: HOSPITAL | Age: 62
End: 2022-01-11
Attending: INTERNAL MEDICINE

## 2022-01-11 DIAGNOSIS — C34.02 LUNG CANCER, MAIN BRONCHUS, LEFT: ICD-10-CM

## 2022-01-11 DIAGNOSIS — C79.51 SECONDARY MALIGNANT NEOPLASM OF BONE: Primary | ICD-10-CM

## 2022-01-11 DIAGNOSIS — R91.8 MULTIPLE PULMONARY NODULES: ICD-10-CM

## 2022-01-11 PROCEDURE — 96413 CHEMO IV INFUSION 1 HR: CPT

## 2022-01-11 PROCEDURE — 25000003 PHARM REV CODE 250: Performed by: STUDENT IN AN ORGANIZED HEALTH CARE EDUCATION/TRAINING PROGRAM

## 2022-01-11 PROCEDURE — A4216 STERILE WATER/SALINE, 10 ML: HCPCS | Performed by: STUDENT IN AN ORGANIZED HEALTH CARE EDUCATION/TRAINING PROGRAM

## 2022-01-11 PROCEDURE — 63600175 PHARM REV CODE 636 W HCPCS: Performed by: STUDENT IN AN ORGANIZED HEALTH CARE EDUCATION/TRAINING PROGRAM

## 2022-01-11 PROCEDURE — 96374 THER/PROPH/DIAG INJ IV PUSH: CPT

## 2022-01-11 RX ORDER — HEPARIN 100 UNIT/ML
500 SYRINGE INTRAVENOUS
Status: DISCONTINUED | OUTPATIENT
Start: 2022-01-11 | End: 2022-01-11 | Stop reason: HOSPADM

## 2022-01-11 RX ORDER — ONDANSETRON 2 MG/ML
8 INJECTION INTRAMUSCULAR; INTRAVENOUS
Status: COMPLETED | OUTPATIENT
Start: 2022-01-11 | End: 2022-01-11

## 2022-01-11 RX ORDER — SODIUM CHLORIDE 0.9 % (FLUSH) 0.9 %
10 SYRINGE (ML) INJECTION
Status: DISCONTINUED | OUTPATIENT
Start: 2022-01-11 | End: 2022-01-11 | Stop reason: HOSPADM

## 2022-01-11 RX ADMIN — HEPARIN 500 UNITS: 100 SYRINGE at 03:01

## 2022-01-11 RX ADMIN — ONDANSETRON 8 MG: 2 INJECTION INTRAMUSCULAR; INTRAVENOUS at 02:01

## 2022-01-11 RX ADMIN — Medication 10 ML: at 03:01

## 2022-01-11 RX ADMIN — GEMCITABINE HYDROCHLORIDE 2320 MG: 2 INJECTION, SOLUTION INTRAVENOUS at 02:01

## 2022-01-11 NOTE — PLAN OF CARE
Pt presents to unit for C7D1 of Gemzar. Pt went to lab just prior to visit. Labs along with plan of care reviewed. Pt cleared for tx today per . Denies any new or worsening complaints today. Given 8mg of Zofran IVP. Gemzar then given over 30 minutes. Tolerated well without any issues. Pt given discharge instructions along with next appointments. Left unit in NAD at time of discharge.

## 2022-01-13 ENCOUNTER — TELEPHONE (OUTPATIENT)
Dept: FAMILY MEDICINE | Facility: CLINIC | Age: 62
End: 2022-01-13

## 2022-01-13 ENCOUNTER — PATIENT MESSAGE (OUTPATIENT)
Dept: FAMILY MEDICINE | Facility: CLINIC | Age: 62
End: 2022-01-13

## 2022-01-13 NOTE — TELEPHONE ENCOUNTER
----- Message from Yoni Franco sent at 1/13/2022 10:31 AM CST -----   Name of Who is Calling:     What is the request in detail:  request call back in reference to missed call back and  papers that was dropped of at office Please contact to further discuss and advise      Can the clinic reply by [facility]:     [facility]  [phone]/ fay / wife

## 2022-01-18 ENCOUNTER — INFUSION (OUTPATIENT)
Dept: INFUSION THERAPY | Facility: HOSPITAL | Age: 62
End: 2022-01-18
Attending: INTERNAL MEDICINE
Payer: MEDICARE

## 2022-01-18 VITALS
RESPIRATION RATE: 16 BRPM | SYSTOLIC BLOOD PRESSURE: 153 MMHG | HEART RATE: 80 BPM | OXYGEN SATURATION: 98 % | DIASTOLIC BLOOD PRESSURE: 79 MMHG | TEMPERATURE: 98 F

## 2022-01-18 NOTE — PLAN OF CARE
Pt presents to unit for potential C7D8 of Gemzar. Went to lab prior to visit. Pt's ANC today is 0.2. Per , states to hold chemo for 2 weeks and recheck pt's labs then. Pt updated on plan of care. Pt and wife verbalized understanding. Given next appointments. Left unit in NAD at time of discharge.

## 2022-01-26 ENCOUNTER — PATIENT MESSAGE (OUTPATIENT)
Dept: FAMILY MEDICINE | Facility: CLINIC | Age: 62
End: 2022-01-26

## 2022-01-26 ENCOUNTER — TELEPHONE (OUTPATIENT)
Dept: FAMILY MEDICINE | Facility: CLINIC | Age: 62
End: 2022-01-26

## 2022-01-26 NOTE — TELEPHONE ENCOUNTER
----- Message from Gracia Santos sent at 1/26/2022  9:53 AM CST -----  Regarding: Sam Luz [phone]  Type: Patient Call Back    Who called: Sam Luz    What is the request in detail: She stated she has been calling in and sending messages regarding the paper work for insurance and has not gotten any response. She wants to come pick it  up    Can the clinic reply by [facility]? no    Would the patient rather a call back or a response via [facility]?  Call back    Best call back number: [phone]

## 2022-01-28 ENCOUNTER — OFFICE VISIT (OUTPATIENT)
Dept: HEMATOLOGY/ONCOLOGY | Facility: CLINIC | Age: 62
End: 2022-01-28

## 2022-01-28 VITALS
HEART RATE: 97 BPM | HEIGHT: 70 IN | OXYGEN SATURATION: 99 % | WEIGHT: 214.94 LBS | SYSTOLIC BLOOD PRESSURE: 176 MMHG | TEMPERATURE: 98 F | DIASTOLIC BLOOD PRESSURE: 106 MMHG | BODY MASS INDEX: 30.77 KG/M2

## 2022-01-28 DIAGNOSIS — I10 ESSENTIAL HYPERTENSION: ICD-10-CM

## 2022-01-28 DIAGNOSIS — I10 BENIGN ESSENTIAL HTN: ICD-10-CM

## 2022-01-28 DIAGNOSIS — I10 PRIMARY HYPERTENSION: ICD-10-CM

## 2022-01-28 DIAGNOSIS — C34.02 LUNG CANCER, MAIN BRONCHUS, LEFT: Primary | ICD-10-CM

## 2022-01-28 DIAGNOSIS — Z79.899 IMMUNODEFICIENCY DUE TO DRUGS: ICD-10-CM

## 2022-01-28 DIAGNOSIS — D84.821 IMMUNODEFICIENCY DUE TO DRUGS: ICD-10-CM

## 2022-01-28 DIAGNOSIS — D69.6 THROMBOCYTOPENIA, UNSPECIFIED: ICD-10-CM

## 2022-01-28 PROCEDURE — 99215 OFFICE O/P EST HI 40 MIN: CPT | Mod: S$PBB,,, | Performed by: STUDENT IN AN ORGANIZED HEALTH CARE EDUCATION/TRAINING PROGRAM

## 2022-01-28 PROCEDURE — 99215 PR OFFICE/OUTPT VISIT, EST, LEVL V, 40-54 MIN: ICD-10-PCS | Mod: S$PBB,,, | Performed by: STUDENT IN AN ORGANIZED HEALTH CARE EDUCATION/TRAINING PROGRAM

## 2022-01-28 PROCEDURE — 99999 PR PBB SHADOW E&M-EST. PATIENT-LVL III: ICD-10-PCS | Mod: PBBFAC,,, | Performed by: STUDENT IN AN ORGANIZED HEALTH CARE EDUCATION/TRAINING PROGRAM

## 2022-01-28 PROCEDURE — 99213 OFFICE O/P EST LOW 20 MIN: CPT | Mod: PBBFAC | Performed by: STUDENT IN AN ORGANIZED HEALTH CARE EDUCATION/TRAINING PROGRAM

## 2022-01-28 PROCEDURE — 99999 PR PBB SHADOW E&M-EST. PATIENT-LVL III: CPT | Mod: PBBFAC,,, | Performed by: STUDENT IN AN ORGANIZED HEALTH CARE EDUCATION/TRAINING PROGRAM

## 2022-01-28 RX ORDER — ATENOLOL 50 MG/1
50 TABLET ORAL DAILY
Qty: 30 TABLET | Refills: 11 | Status: SHIPPED | OUTPATIENT
Start: 2022-01-28 | End: 2022-03-17

## 2022-01-28 RX ORDER — AMLODIPINE BESYLATE 10 MG/1
10 TABLET ORAL DAILY
Qty: 30 TABLET | Refills: 11 | Status: SHIPPED | OUTPATIENT
Start: 2022-01-28 | End: 2022-03-05

## 2022-01-28 NOTE — Clinical Note
When to follow up: 2/25 Labs needed: port draw labs 2/1, 2/15, 3/1 prior to chemo CBC and Comprehensive Metabolic Panel  Chemotherapy Regimen:  Next Treatment Date 2/1/2022      gemcitabine (GEMZAR) 2,320 mg in sodium chloride 0.9% 250 mL chemo infusion 2/15/2022      gemcitabine (GEMZAR) 2,320 mg in sodium chloride 0.9% 250 mL chemo infusion 3/1/2022      gemcitabine (GEMZAR) 2,320 mg in sodium chloride 0.9% 250 mL chemo infusion  Provider: Rivas

## 2022-01-28 NOTE — PROGRESS NOTES
"  PATIENT: Kashif Iverson  MRN: [id number]  DATE: 1/28/2022      Diagnosis:   1. Lung cancer, main bronchus, left    2. Essential hypertension    3. Benign essential HTN    4. Thrombocytopenia, unspecified    5. Immunodeficiency due to drugs    6. Primary hypertension        Chief Complaint: Cancer      Oncologic History:    Oncologic History 1. 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 Palliative chemoradiation with carboplatin and paclitaxel; anaphylactic reaction to paclitaxel  2. Pembrolizumab  3. Pembrolizumab+ramucirumab (LungMAP trial)  4. Gemcitabine    Pathology Squamous cell carcinoma, no actionable mutations, PD-L1 5%        Subjective:    Interval History: Mr. Iverson returns for follow up.     8/30/2019 underwent bronchoscopy that showed "A partially obstructing (about 80% obstructed) mass was found in the middle portion in the left mainstem bronchus. The mass was large and bloody, circumferential, endobronchial, friable and necrotic. 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  No actionable mutations and PD-L1 5%.  9/19/21 staging PET CT showed "Hypermetabolic left lung mass concerning for primary pulmonary malignancy with metastatic disease involving the right 6th and 9th ribs as well as mediastinal and left supraclavicular lymph nodes."  Stage IV squamous cell carcinoma of the lung at diagnosis.    10/8/2019-10/21/2019 he received palliative chemoradiation for rib pain with carboplatin and paclitaxel.  He received 3 cycles of carboplatin and paclitaxel.  He developed anaphylactic reaction to paclitaxel.  The chest was treated with AP:PA fields and the right 9th rib was treated with anterior and posterior oblique fields at 300 cGy per fraction to 3000 cGy.   Lt chest 6X 300 10 / 10 3,000   Rt 9th rib 6X 300 10 / 10 3,000     10/31/2019-9/10/2020 he received pembrolizumab (completed 15 cycles, " last dose 8/21/2020)  9/10/2020 PET CT showed progression of disease.  He was then referred to Dr. Key for evaluation for clinical trials.  10/20/2020 he underwent repeat biopsy for LUNG MAP trial and was randomized to Ramucirumab + pembrolizumab.    11/17/2020 started ramucirumab+pembrolizumab.  Completed 4 cycles and then 2/10/2021 scans showed progression.    2/24/2021 he was subsequent started on gemcitabine with Dr. Cruz.  His care was transferred to Dr. Romo who continued his current regimen and now is currently under my care.   --    He continues to do well.  No new symptoms to report.  This past cycle he did require treatment interruption due to chemotherapy induced neutropenia.    Wife accompanies him at this visit.    Past Medical History:   Past Medical History:   Diagnosis Date    Hypertension     Lung cancer     NSCLC    Lung mass     left lung       Past Surgical HIstory:   Past Surgical History:   Procedure Laterality Date    BRONCHOSCOPY N/A 8/30/2019    Procedure: Bronchoscopy;  [facility];  Location: [facility] OR [address] [place] [address];  Service: Anesthesiology;  Laterality: N/A;       Family History:   Family History   Problem Relation Age of Onset    Diabetes Mother     Heart defect Mother     Cancer Father     Cancer Sister     No Known Problems Son        Social History:  reports that he quit smoking about 2 years ago. His smoking use included cigarettes. He has a 25.00 pack-year smoking history. He has never used smokeless tobacco. He reports current alcohol use. He reports that he does not use drugs.    Allergies:  Review of patient's allergies indicates:  No Known Allergies    Medications:  Current Outpatient Medications   Medication Sig Dispense Refill    ascorbic acid-multivit-min (VITAMIN C ENERGY BOOSTER) 1,000 mg PwEP Take by mouth. Patient takes 3,000 mg per day      LIDOcaine-prilocaine (EMLA) cream Apply generously to port site 30-60 min prior to chemo and then  "cover with saran wrap. 30 g 2    ondansetron (ZOFRAN) 8 MG tablet Take 1 tablet (8 mg total) by mouth 4 (four) times daily as needed for Nausea. 60 tablet 2    amLODIPine (NORVASC) 10 MG tablet Take 1 tablet (10 mg total) by mouth once daily. 30 tablet 11    atenoloL (TENORMIN) 50 MG tablet Take 1 tablet (50 mg total) by mouth once daily. 30 tablet 11     No current facility-administered medications for this visit.       Review of Systems   Constitutional: Negative for activity change, appetite change, chills, diaphoresis, fatigue, fever and unexpected weight change.   HENT: Negative for nosebleeds and trouble swallowing.    Eyes: Negative for visual disturbance.   Respiratory: Negative for cough, chest tightness, shortness of breath and wheezing.    Cardiovascular: Negative for chest pain and leg swelling.   Gastrointestinal: Negative for abdominal distention, abdominal pain, blood in stool, constipation, diarrhea, nausea and vomiting.   Endocrine: Negative for cold intolerance and heat intolerance.   Genitourinary: Negative for difficulty urinating and dysuria.   Musculoskeletal: Negative for arthralgias, back pain and myalgias.   Skin: Negative for color change.   Neurological: Negative for dizziness, weakness, light-headedness, numbness and headaches.   Hematological: Negative for adenopathy. Bruises/bleeds easily.   Psychiatric/Behavioral: Negative for confusion.       ECOG Performance Status:     ECOG SCORE    1 - Restricted in strenuous activity-ambulatory and able to carry out work of a light nature         Objective:      Vitals:   Vitals:    01/28/22 0949   BP: (!) 176/106   BP Location: Left arm   Patient Position: Sitting   Pulse: 97   Temp: 98.4 °F (36.9 °C)   TempSrc: Oral   SpO2: 99%   Weight: 97.5 kg (214 lb 15.2 oz)   Height: 5' 10" (1.778 m)     BMI: Body mass index is 30.84 kg/m².    Physical Exam  Constitutional:       General: He is not in acute distress.     Appearance: Normal appearance. He " is not ill-appearing.   HENT:      Head: Normocephalic and atraumatic.      Mouth/Throat:      Pharynx: No oropharyngeal exudate or posterior oropharyngeal erythema.   Eyes:      General: No scleral icterus.     Extraocular Movements: Extraocular movements intact.      Conjunctiva/sclera: Conjunctivae normal.      Pupils: Pupils are equal, round, and reactive to light.   Cardiovascular:      Rate and Rhythm: Normal rate and regular rhythm.      Heart sounds: No murmur heard.  No friction rub. No gallop.       Comments: Right chest wall port c/d/i  Pulmonary:      Effort: Pulmonary effort is normal. No respiratory distress.      Breath sounds: No stridor. No wheezing, rhonchi or rales.   Abdominal:      General: Bowel sounds are normal. There is no distension.      Palpations: Abdomen is soft. There is no mass.      Tenderness: There is no abdominal tenderness. There is no guarding or rebound.   Musculoskeletal:         General: No swelling or tenderness (negative homans). Normal range of motion.      Cervical back: Normal range of motion and neck supple.      Right lower leg: No edema.      Left lower leg: No edema.   Skin:     General: Skin is warm and dry.   Neurological:      General: No focal deficit present.      Mental Status: He is alert.         Laboratory Data:   No results found for this or any previous visit (from the past 168 hour(s)).      Imaging:       Assessment:       1. Lung cancer, main bronchus, left    2. Essential hypertension    3. Benign essential HTN    4. Thrombocytopenia, unspecified    5. Immunodeficiency due to drugs    6. Primary hypertension           Plan:       Problem List Items Addressed This Visit        Cardiac/Vascular    Primary hypertension    Current Assessment & Plan     Elevated BP  -start amlodipine 10mg  -continue home atenolol            Hematology    Thrombocytopenia, unspecified    Current Assessment & Plan     Secondary to chemotherapy.  Asymptomatic. Currently  "resolved  -monitor with [facility]            Oncology    Lung cancer, main bronchus, left - Primary    Overview     8/30/2019 underwent bronchoscopy that showed "A partially obstructing (about 80% obstructed) mass was found in the middle portion in the left mainstem bronchus. The mass was large and bloody, circumferential, endobronchial, friable and necrotic. 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  No actionable mutations and PD-L1 5%.  9/19/21 staging PET CT showed "Hypermetabolic left lung mass concerning for primary pulmonary malignancy with metastatic disease involving the right 6th and 9th ribs as well as mediastinal and left supraclavicular lymph nodes."  Stage IV squamous cell carcinoma of the lung at diagnosis.    10/8/2019-10/21/2019 he received palliative chemoradiation for rib pain with carboplatin and paclitaxel.  He received 3 cycles of carboplatin and paclitaxel.  He developed anaphylactic reaction to paclitaxel.  The chest was treated with AP:PA fields and the right 9th rib was treated with anterior and posterior oblique fields at 300 cGy per fraction to 3000 cGy.   Lt chest 6X 300 10 / 10 3,000   Rt 9th rib 6X 300 10 / 10 3,000     10/31/2019-9/10/2020 he received pembrolizumab (completed 15 cycles, last dose 8/21/2020)  9/10/2020 PET CT showed progression of disease.  He was then referred to Dr. Key for evaluation for clinical trials.  10/20/2020 he underwent repeat biopsy for LUNG MAP trial and was randomized to Ramucirumab + pembrolizumab.    11/17/2020 started ramucirumab+pembrolizumab.  Completed 4 cycles and then 2/10/2021 scans showed progression.    2/24/2021 he was subsequent started on gemcitabine with Dr. Cruz.         Current Assessment & Plan     S/p right chest port placement.  1/5/2022 CT scan showing stable disease.  Treatment interruption due to chemotherapy induced neutropenia.    -Reviewed with him to " change dosing to every other week for a few cycles.  He is agreeable with this plan.  -port draw labs next week prior to chemo  -follow up with me in 1 month         Relevant Orders    CBC Auto Differential    Comprehensive Metabolic Panel       Other    Immunodeficiency due to drugs    Current Assessment & Plan     Chemo induced neutropenia requiring treatment interruption this past cycle  -see lung cancer           Other Visit Diagnoses     Essential hypertension        Relevant Medications    amLODIPine (NORVASC) 10 MG tablet    Benign essential HTN        Relevant Medications    atenoloL (TENORMIN) 50 MG tablet          Orders Placed This Encounter   Procedures    CBC Auto Differential    Comprehensive Metabolic Panel           Mario Hathaway MD  Hematology Oncology

## 2022-01-28 NOTE — ASSESSMENT & PLAN NOTE
S/p right chest port placement.  1/5/2022 CT scan showing stable disease.  Treatment interruption due to chemotherapy induced neutropenia.    -Reviewed with him to change dosing to every other week for a few cycles.  He is agreeable with this plan.  -port draw labs next week prior to chemo  -follow up with me in 1 month

## 2022-02-01 ENCOUNTER — INFUSION (OUTPATIENT)
Dept: INFUSION THERAPY | Facility: HOSPITAL | Age: 62
End: 2022-02-01
Attending: STUDENT IN AN ORGANIZED HEALTH CARE EDUCATION/TRAINING PROGRAM
Payer: MEDICARE

## 2022-02-01 VITALS
HEART RATE: 73 BPM | OXYGEN SATURATION: 97 % | RESPIRATION RATE: 16 BRPM | WEIGHT: 216.06 LBS | BODY MASS INDEX: 30.93 KG/M2 | HEIGHT: 70 IN | DIASTOLIC BLOOD PRESSURE: 85 MMHG | TEMPERATURE: 99 F | SYSTOLIC BLOOD PRESSURE: 141 MMHG

## 2022-02-01 DIAGNOSIS — C79.51 SECONDARY MALIGNANT NEOPLASM OF BONE: ICD-10-CM

## 2022-02-01 DIAGNOSIS — C34.02 LUNG CANCER, MAIN BRONCHUS, LEFT: Primary | ICD-10-CM

## 2022-02-01 DIAGNOSIS — R91.8 MULTIPLE PULMONARY NODULES: ICD-10-CM

## 2022-02-01 LAB
ALBUMIN SERPL BCP-MCNC: 3.5 G/DL (ref 3.5–5.2)
ALP SERPL-CCNC: 75 U/L (ref 55–135)
ALT SERPL W/O P-5'-P-CCNC: 15 U/L (ref 10–44)
ANION GAP SERPL CALC-SCNC: 6 MMOL/L (ref 8–16)
AST SERPL-CCNC: 19 U/L (ref 10–40)
BASOPHILS # BLD AUTO: 0.12 K/UL (ref 0–0.2)
BASOPHILS NFR BLD: 1.6 % (ref 0–1.9)
BILIRUB SERPL-MCNC: 0.5 MG/DL (ref 0.1–1)
BUN SERPL-MCNC: 8 MG/DL (ref 8–23)
CALCIUM SERPL-MCNC: 8.8 MG/DL (ref 8.7–10.5)
CHLORIDE SERPL-SCNC: 108 MMOL/L (ref 95–110)
CO2 SERPL-SCNC: 26 MMOL/L (ref 23–29)
CREAT SERPL-MCNC: 0.9 MG/DL (ref 0.5–1.4)
DIFFERENTIAL METHOD: ABNORMAL
EOSINOPHIL # BLD AUTO: 0.1 K/UL (ref 0–0.5)
EOSINOPHIL NFR BLD: 1.3 % (ref 0–8)
ERYTHROCYTE [DISTWIDTH] IN BLOOD BY AUTOMATED COUNT: 16.1 % (ref 11.5–14.5)
EST. GFR  (AFRICAN AMERICAN): >60 ML/MIN/1.73 M^2
EST. GFR  (NON AFRICAN AMERICAN): >60 ML/MIN/1.73 M^2
GLUCOSE SERPL-MCNC: 120 MG/DL (ref 70–110)
HCT VFR BLD AUTO: 39 % (ref 40–54)
HGB BLD-MCNC: 13.1 G/DL (ref 14–18)
IMM GRANULOCYTES # BLD AUTO: 0.03 K/UL (ref 0–0.04)
IMM GRANULOCYTES NFR BLD AUTO: 0.4 % (ref 0–0.5)
LYMPHOCYTES # BLD AUTO: 1.6 K/UL (ref 1–4.8)
LYMPHOCYTES NFR BLD: 21.1 % (ref 18–48)
MCH RBC QN AUTO: 32.1 PG (ref 27–31)
MCHC RBC AUTO-ENTMCNC: 33.6 G/DL (ref 32–36)
MCV RBC AUTO: 96 FL (ref 82–98)
MONOCYTES # BLD AUTO: 1.1 K/UL (ref 0.3–1)
MONOCYTES NFR BLD: 14.8 % (ref 4–15)
NEUTROPHILS # BLD AUTO: 4.6 K/UL (ref 1.8–7.7)
NEUTROPHILS NFR BLD: 60.8 % (ref 38–73)
NRBC BLD-RTO: 0 /100 WBC
PLATELET # BLD AUTO: 213 K/UL (ref 150–450)
PMV BLD AUTO: 10.9 FL (ref 9.2–12.9)
POTASSIUM SERPL-SCNC: 3.2 MMOL/L (ref 3.5–5.1)
PROT SERPL-MCNC: 7.1 G/DL (ref 6–8.4)
RBC # BLD AUTO: 4.08 M/UL (ref 4.6–6.2)
SODIUM SERPL-SCNC: 140 MMOL/L (ref 136–145)
WBC # BLD AUTO: 7.52 K/UL (ref 3.9–12.7)

## 2022-02-01 PROCEDURE — 85025 COMPLETE CBC W/AUTO DIFF WBC: CPT | Performed by: STUDENT IN AN ORGANIZED HEALTH CARE EDUCATION/TRAINING PROGRAM

## 2022-02-01 PROCEDURE — 96413 CHEMO IV INFUSION 1 HR: CPT

## 2022-02-01 PROCEDURE — A4216 STERILE WATER/SALINE, 10 ML: HCPCS | Performed by: STUDENT IN AN ORGANIZED HEALTH CARE EDUCATION/TRAINING PROGRAM

## 2022-02-01 PROCEDURE — 80053 COMPREHEN METABOLIC PANEL: CPT | Performed by: STUDENT IN AN ORGANIZED HEALTH CARE EDUCATION/TRAINING PROGRAM

## 2022-02-01 PROCEDURE — 25000003 PHARM REV CODE 250: Performed by: STUDENT IN AN ORGANIZED HEALTH CARE EDUCATION/TRAINING PROGRAM

## 2022-02-01 PROCEDURE — 96375 TX/PRO/DX INJ NEW DRUG ADDON: CPT

## 2022-02-01 PROCEDURE — 63600175 PHARM REV CODE 636 W HCPCS: Performed by: STUDENT IN AN ORGANIZED HEALTH CARE EDUCATION/TRAINING PROGRAM

## 2022-02-01 RX ORDER — HEPARIN 100 UNIT/ML
500 SYRINGE INTRAVENOUS
Status: DISCONTINUED | OUTPATIENT
Start: 2022-02-01 | End: 2022-02-01 | Stop reason: HOSPADM

## 2022-02-01 RX ORDER — SODIUM CHLORIDE 0.9 % (FLUSH) 0.9 %
10 SYRINGE (ML) INJECTION
Status: DISCONTINUED | OUTPATIENT
Start: 2022-02-01 | End: 2022-02-01 | Stop reason: HOSPADM

## 2022-02-01 RX ORDER — ONDANSETRON 2 MG/ML
8 INJECTION INTRAMUSCULAR; INTRAVENOUS
Status: COMPLETED | OUTPATIENT
Start: 2022-02-01 | End: 2022-02-01

## 2022-02-01 RX ADMIN — HEPARIN 500 UNITS: 100 SYRINGE at 03:02

## 2022-02-01 RX ADMIN — Medication 10 ML: at 03:02

## 2022-02-01 RX ADMIN — GEMCITABINE HYDROCHLORIDE 2320 MG: 2 INJECTION, SOLUTION INTRAVENOUS at 02:02

## 2022-02-01 RX ADMIN — ONDANSETRON 8 MG: 2 INJECTION INTRAMUSCULAR; INTRAVENOUS at 01:02

## 2022-02-01 NOTE — PLAN OF CARE
Pt arrived to unit. VSS. Blood collected from port for cbc, cmp. No new issues noted. Labs resulted. Tolerated Gemzar. No reactions noted. Pt given next appts. Pt ambulated off unit, accompanied by wife. No distress noted.

## 2022-02-02 ENCOUNTER — TELEPHONE (OUTPATIENT)
Dept: HEMATOLOGY/ONCOLOGY | Facility: CLINIC | Age: 62
End: 2022-02-02
Payer: MEDICARE

## 2022-02-02 ENCOUNTER — PATIENT MESSAGE (OUTPATIENT)
Dept: HEMATOLOGY/ONCOLOGY | Facility: CLINIC | Age: 62
End: 2022-02-02
Payer: MEDICARE

## 2022-02-02 NOTE — TELEPHONE ENCOUNTER
----- Message from Mario Hathaway MD sent at 2/1/2022  7:33 AM CST -----  I can see him at 1pm thx -e  ----- Message -----  From: Aj Cespedes MA  Sent: 1/28/2022  10:24 AM CST  To: Mario Hathaway MD    Is it okay to double book you on the 2/25/2022 for patient follow up appointment?    Aj    ----- Message -----  From: Mario Hathaway MD  Sent: 1/28/2022  10:14 AM CST  To: [facility] Hem Onc Clinical Staff, #    When to follow up: 2/25  Labs needed: port draw labs 2/1, 2/15, 3/1 prior to chemo CBC and Comprehensive Metabolic Panel   Chemotherapy Regimen:   Next Treatment Date  2/1/2022       gemcitabine (GEMZAR) 2,320 mg in sodium chloride 0.9% 250 mL chemo infusion  2/15/2022       gemcitabine (GEMZAR) 2,320 mg in sodium chloride 0.9% 250 mL chemo infusion  3/1/2022       gemcitabine (GEMZAR) 2,320 mg in sodium chloride 0.9% 250 mL chemo infusion    Provider: Rivas

## 2022-02-04 ENCOUNTER — LAB VISIT (OUTPATIENT)
Dept: LAB | Facility: HOSPITAL | Age: 62
End: 2022-02-04
Attending: STUDENT IN AN ORGANIZED HEALTH CARE EDUCATION/TRAINING PROGRAM
Payer: MEDICARE

## 2022-02-04 DIAGNOSIS — C34.02 LUNG CANCER, MAIN BRONCHUS, LEFT: ICD-10-CM

## 2022-02-04 LAB
ALBUMIN SERPL BCP-MCNC: 3.2 G/DL (ref 3.5–5.2)
ALP SERPL-CCNC: 68 U/L (ref 55–135)
ALT SERPL W/O P-5'-P-CCNC: 21 U/L (ref 10–44)
ANION GAP SERPL CALC-SCNC: 7 MMOL/L (ref 8–16)
AST SERPL-CCNC: 28 U/L (ref 10–40)
BASOPHILS # BLD AUTO: 0.09 K/UL (ref 0–0.2)
BASOPHILS NFR BLD: 1.1 % (ref 0–1.9)
BILIRUB SERPL-MCNC: 1.1 MG/DL (ref 0.1–1)
BUN SERPL-MCNC: 12 MG/DL (ref 8–23)
CALCIUM SERPL-MCNC: 8.9 MG/DL (ref 8.7–10.5)
CHLORIDE SERPL-SCNC: 104 MMOL/L (ref 95–110)
CO2 SERPL-SCNC: 27 MMOL/L (ref 23–29)
CREAT SERPL-MCNC: 0.8 MG/DL (ref 0.5–1.4)
DIFFERENTIAL METHOD: ABNORMAL
EOSINOPHIL # BLD AUTO: 0.1 K/UL (ref 0–0.5)
EOSINOPHIL NFR BLD: 0.9 % (ref 0–8)
ERYTHROCYTE [DISTWIDTH] IN BLOOD BY AUTOMATED COUNT: 15.7 % (ref 11.5–14.5)
EST. GFR  (AFRICAN AMERICAN): >60 ML/MIN/1.73 M^2
EST. GFR  (NON AFRICAN AMERICAN): >60 ML/MIN/1.73 M^2
GLUCOSE SERPL-MCNC: 139 MG/DL (ref 70–110)
HCT VFR BLD AUTO: 37.9 % (ref 40–54)
HGB BLD-MCNC: 12.3 G/DL (ref 14–18)
IMM GRANULOCYTES # BLD AUTO: 0.02 K/UL (ref 0–0.04)
IMM GRANULOCYTES NFR BLD AUTO: 0.2 % (ref 0–0.5)
LYMPHOCYTES # BLD AUTO: 0.7 K/UL (ref 1–4.8)
LYMPHOCYTES NFR BLD: 9.2 % (ref 18–48)
MCH RBC QN AUTO: 32 PG (ref 27–31)
MCHC RBC AUTO-ENTMCNC: 32.5 G/DL (ref 32–36)
MCV RBC AUTO: 99 FL (ref 82–98)
MONOCYTES # BLD AUTO: 0.3 K/UL (ref 0.3–1)
MONOCYTES NFR BLD: 3.9 % (ref 4–15)
NEUTROPHILS # BLD AUTO: 6.8 K/UL (ref 1.8–7.7)
NEUTROPHILS NFR BLD: 84.7 % (ref 38–73)
NRBC BLD-RTO: 0 /100 WBC
PLATELET # BLD AUTO: 170 K/UL (ref 150–450)
PMV BLD AUTO: 11.2 FL (ref 9.2–12.9)
POTASSIUM SERPL-SCNC: 3.7 MMOL/L (ref 3.5–5.1)
PROT SERPL-MCNC: 6.8 G/DL (ref 6–8.4)
RBC # BLD AUTO: 3.84 M/UL (ref 4.6–6.2)
SODIUM SERPL-SCNC: 138 MMOL/L (ref 136–145)
WBC # BLD AUTO: 8.04 K/UL (ref 3.9–12.7)

## 2022-02-04 PROCEDURE — 36415 COLL VENOUS BLD VENIPUNCTURE: CPT | Performed by: STUDENT IN AN ORGANIZED HEALTH CARE EDUCATION/TRAINING PROGRAM

## 2022-02-04 PROCEDURE — 85025 COMPLETE CBC W/AUTO DIFF WBC: CPT | Performed by: STUDENT IN AN ORGANIZED HEALTH CARE EDUCATION/TRAINING PROGRAM

## 2022-02-04 PROCEDURE — 80053 COMPREHEN METABOLIC PANEL: CPT | Performed by: STUDENT IN AN ORGANIZED HEALTH CARE EDUCATION/TRAINING PROGRAM

## 2022-02-15 ENCOUNTER — INFUSION (OUTPATIENT)
Dept: INFUSION THERAPY | Facility: HOSPITAL | Age: 62
End: 2022-02-15
Attending: STUDENT IN AN ORGANIZED HEALTH CARE EDUCATION/TRAINING PROGRAM
Payer: MEDICARE

## 2022-02-15 VITALS
TEMPERATURE: 98 F | BODY MASS INDEX: 31.28 KG/M2 | HEART RATE: 80 BPM | OXYGEN SATURATION: 97 % | WEIGHT: 218 LBS | RESPIRATION RATE: 16 BRPM | DIASTOLIC BLOOD PRESSURE: 81 MMHG | SYSTOLIC BLOOD PRESSURE: 133 MMHG

## 2022-02-15 DIAGNOSIS — C79.51 SECONDARY MALIGNANT NEOPLASM OF BONE: ICD-10-CM

## 2022-02-15 DIAGNOSIS — C34.02 LUNG CANCER, MAIN BRONCHUS, LEFT: Primary | ICD-10-CM

## 2022-02-15 DIAGNOSIS — R91.8 MULTIPLE PULMONARY NODULES: ICD-10-CM

## 2022-02-15 PROCEDURE — 96413 CHEMO IV INFUSION 1 HR: CPT

## 2022-02-15 PROCEDURE — A4216 STERILE WATER/SALINE, 10 ML: HCPCS | Performed by: STUDENT IN AN ORGANIZED HEALTH CARE EDUCATION/TRAINING PROGRAM

## 2022-02-15 PROCEDURE — 63600175 PHARM REV CODE 636 W HCPCS: Performed by: STUDENT IN AN ORGANIZED HEALTH CARE EDUCATION/TRAINING PROGRAM

## 2022-02-15 PROCEDURE — 25000003 PHARM REV CODE 250: Performed by: STUDENT IN AN ORGANIZED HEALTH CARE EDUCATION/TRAINING PROGRAM

## 2022-02-15 PROCEDURE — 96375 TX/PRO/DX INJ NEW DRUG ADDON: CPT

## 2022-02-15 PROCEDURE — 96374 THER/PROPH/DIAG INJ IV PUSH: CPT

## 2022-02-15 RX ORDER — ONDANSETRON 2 MG/ML
8 INJECTION INTRAMUSCULAR; INTRAVENOUS
Status: CANCELLED | OUTPATIENT
Start: 2022-03-22

## 2022-02-15 RX ORDER — ONDANSETRON 2 MG/ML
8 INJECTION INTRAMUSCULAR; INTRAVENOUS
Status: COMPLETED | OUTPATIENT
Start: 2022-02-15 | End: 2022-02-15

## 2022-02-15 RX ORDER — HEPARIN 100 UNIT/ML
500 SYRINGE INTRAVENOUS
Status: DISCONTINUED | OUTPATIENT
Start: 2022-02-15 | End: 2022-02-15 | Stop reason: HOSPADM

## 2022-02-15 RX ORDER — SODIUM CHLORIDE 0.9 % (FLUSH) 0.9 %
10 SYRINGE (ML) INJECTION
Status: DISCONTINUED | OUTPATIENT
Start: 2022-02-15 | End: 2022-02-15 | Stop reason: HOSPADM

## 2022-02-15 RX ORDER — SODIUM CHLORIDE 0.9 % (FLUSH) 0.9 %
10 SYRINGE (ML) INJECTION
Status: CANCELLED | OUTPATIENT
Start: 2022-03-22

## 2022-02-15 RX ORDER — HEPARIN 100 UNIT/ML
500 SYRINGE INTRAVENOUS
Status: CANCELLED | OUTPATIENT
Start: 2022-03-22

## 2022-02-15 RX ADMIN — HEPARIN 500 UNITS: 100 SYRINGE at 02:02

## 2022-02-15 RX ADMIN — GEMCITABINE HYDROCHLORIDE 2210 MG: 2 INJECTION, SOLUTION INTRAVENOUS at 02:02

## 2022-02-15 RX ADMIN — ONDANSETRON 8 MG: 2 INJECTION INTRAMUSCULAR; INTRAVENOUS at 01:02

## 2022-02-15 RX ADMIN — Medication 10 ML: at 02:02

## 2022-02-15 NOTE — PLAN OF CARE
Pt presents to unit for C8D1 of Gemzar. Went to lab prior to visit. No new or worsening complaints to report today. VSS. Labs reviewed. Pt okay to proceed with tx today per . Given pre-med of Zofran IVP. Gemzar then given over 30 minutes. Tolerated well without any issues. Given discharge instructions with next upcoming appointments. Left unit in NAD at time of discharge.

## 2022-02-22 ENCOUNTER — PATIENT MESSAGE (OUTPATIENT)
Dept: FAMILY MEDICINE | Facility: CLINIC | Age: 62
End: 2022-02-22
Payer: MEDICARE

## 2022-02-22 ENCOUNTER — HOSPITAL ENCOUNTER (INPATIENT)
Facility: HOSPITAL | Age: 62
LOS: 11 days | Discharge: HOME | DRG: 208 | End: 2022-03-05
Attending: EMERGENCY MEDICINE | Admitting: EMERGENCY MEDICINE
Payer: MEDICARE

## 2022-02-22 DIAGNOSIS — I48.91 ATRIAL FIBRILLATION WITH RAPID VENTRICULAR RESPONSE: ICD-10-CM

## 2022-02-22 DIAGNOSIS — J96.91 RESPIRATORY FAILURE WITH HYPOXIA: ICD-10-CM

## 2022-02-22 DIAGNOSIS — R00.0 TACHYCARDIA: ICD-10-CM

## 2022-02-22 DIAGNOSIS — R06.02 SOB (SHORTNESS OF BREATH): ICD-10-CM

## 2022-02-22 DIAGNOSIS — I50.21 ACUTE SYSTOLIC HEART FAILURE: ICD-10-CM

## 2022-02-22 DIAGNOSIS — G93.40 ACUTE ENCEPHALOPATHY: ICD-10-CM

## 2022-02-22 DIAGNOSIS — R00.0 SINUS TACHYCARDIA: ICD-10-CM

## 2022-02-22 DIAGNOSIS — J96.01 ACUTE RESPIRATORY FAILURE WITH HYPOXIA: ICD-10-CM

## 2022-02-22 DIAGNOSIS — J96.01 ACUTE RESPIRATORY FAILURE WITH HYPOXIA AND HYPERCAPNIA: ICD-10-CM

## 2022-02-22 DIAGNOSIS — I48.92 ATRIAL FLUTTER: ICD-10-CM

## 2022-02-22 DIAGNOSIS — J96.02 ACUTE RESPIRATORY FAILURE WITH HYPOXIA AND HYPERCAPNIA: ICD-10-CM

## 2022-02-22 DIAGNOSIS — R57.0 CARDIOGENIC SHOCK: ICD-10-CM

## 2022-02-22 DIAGNOSIS — C34.90 MALIGNANT NEOPLASM OF LUNG, UNSPECIFIED LATERALITY, UNSPECIFIED PART OF LUNG: Primary | ICD-10-CM

## 2022-02-22 DIAGNOSIS — I21.4 NSTEMI (NON-ST ELEVATED MYOCARDIAL INFARCTION): ICD-10-CM

## 2022-02-22 DIAGNOSIS — R06.02 SHORTNESS OF BREATH: ICD-10-CM

## 2022-02-22 PROBLEM — E83.39 HYPERPHOSPHATEMIA: Status: ACTIVE | Noted: 2022-02-22

## 2022-02-22 PROBLEM — Z71.89 ACP (ADVANCE CARE PLANNING): Status: ACTIVE | Noted: 2022-02-22

## 2022-02-22 PROBLEM — D53.9 MACROCYTIC ANEMIA: Status: ACTIVE | Noted: 2019-08-24

## 2022-02-22 PROBLEM — E87.20 LACTIC ACIDOSIS: Status: ACTIVE | Noted: 2022-02-22

## 2022-02-22 PROBLEM — R73.9 HYPERGLYCEMIA: Status: ACTIVE | Noted: 2022-02-22

## 2022-02-22 PROBLEM — E87.6 HYPOKALEMIA: Status: ACTIVE | Noted: 2022-02-22

## 2022-02-22 PROBLEM — E87.29 HIGH ANION GAP METABOLIC ACIDOSIS: Status: ACTIVE | Noted: 2022-02-22

## 2022-02-22 PROBLEM — Z87.891 FORMER SMOKER: Status: ACTIVE | Noted: 2019-08-23

## 2022-02-22 LAB
ALBUMIN SERPL BCP-MCNC: 2.9 G/DL (ref 3.5–5.2)
ALLENS TEST: ABNORMAL
ALLENS TEST: ABNORMAL
ALP SERPL-CCNC: 76 U/L (ref 55–135)
ALT SERPL W/O P-5'-P-CCNC: 20 U/L (ref 10–44)
ANION GAP SERPL CALC-SCNC: 14 MMOL/L (ref 8–16)
ANION GAP SERPL CALC-SCNC: 17 MMOL/L (ref 8–16)
APTT BLDCRRT: 25.1 SEC (ref 21–32)
AST SERPL-CCNC: 34 U/L (ref 10–40)
BACTERIA #/AREA URNS HPF: ABNORMAL /HPF
BASOPHILS # BLD AUTO: 0.12 K/UL (ref 0–0.2)
BASOPHILS NFR BLD: 1.4 % (ref 0–1.9)
BILIRUB SERPL-MCNC: 0.7 MG/DL (ref 0.1–1)
BILIRUB UR QL STRIP: NEGATIVE
BNP SERPL-MCNC: 748 PG/ML (ref 0–99)
BUN SERPL-MCNC: 8 MG/DL (ref 8–23)
BUN SERPL-MCNC: 8 MG/DL (ref 8–23)
CALCIUM SERPL-MCNC: 7.8 MG/DL (ref 8.7–10.5)
CALCIUM SERPL-MCNC: 8.4 MG/DL (ref 8.7–10.5)
CHLORIDE SERPL-SCNC: 100 MMOL/L (ref 95–110)
CHLORIDE SERPL-SCNC: 102 MMOL/L (ref 95–110)
CLARITY UR: CLEAR
CO2 SERPL-SCNC: 17 MMOL/L (ref 23–29)
CO2 SERPL-SCNC: 19 MMOL/L (ref 23–29)
COLOR UR: YELLOW
CREAT SERPL-MCNC: 0.9 MG/DL (ref 0.5–1.4)
CREAT SERPL-MCNC: 1 MG/DL (ref 0.5–1.4)
CTP QC/QA: YES
CTP QC/QA: YES
D DIMER PPP IA.FEU-MCNC: 5.01 MG/L FEU
DELSYS: ABNORMAL
DELSYS: ABNORMAL
DIFFERENTIAL METHOD: ABNORMAL
EOSINOPHIL # BLD AUTO: 0 K/UL (ref 0–0.5)
EOSINOPHIL NFR BLD: 0.5 % (ref 0–8)
ERYTHROCYTE [DISTWIDTH] IN BLOOD BY AUTOMATED COUNT: 16.2 % (ref 11.5–14.5)
ERYTHROCYTE [SEDIMENTATION RATE] IN BLOOD BY WESTERGREN METHOD: 26 MM/H
ERYTHROCYTE [SEDIMENTATION RATE] IN BLOOD BY WESTERGREN METHOD: 28 MM/H
EST. GFR  (AFRICAN AMERICAN): >60 ML/MIN/1.73 M^2
EST. GFR  (AFRICAN AMERICAN): >60 ML/MIN/1.73 M^2
EST. GFR  (NON AFRICAN AMERICAN): >60 ML/MIN/1.73 M^2
EST. GFR  (NON AFRICAN AMERICAN): >60 ML/MIN/1.73 M^2
FIO2: 100
FIO2: 50
GLUCOSE SERPL-MCNC: 142 MG/DL (ref 70–110)
GLUCOSE SERPL-MCNC: 280 MG/DL (ref 70–110)
GLUCOSE UR QL STRIP: ABNORMAL
HCO3 UR-SCNC: 22.2 MMOL/L (ref 24–28)
HCO3 UR-SCNC: 23.7 MMOL/L (ref 24–28)
HCT VFR BLD AUTO: 35.8 % (ref 40–54)
HGB BLD-MCNC: 11.5 G/DL (ref 14–18)
HGB UR QL STRIP: ABNORMAL
HYALINE CASTS #/AREA URNS LPF: 9 /LPF
IMM GRANULOCYTES # BLD AUTO: 0.36 K/UL (ref 0–0.04)
IMM GRANULOCYTES NFR BLD AUTO: 4.1 % (ref 0–0.5)
INR PPP: 1.2 (ref 0.8–1.2)
KETONES UR QL STRIP: NEGATIVE
LACTATE SERPL-SCNC: 2.6 MMOL/L (ref 0.5–2.2)
LACTATE SERPL-SCNC: 8.7 MMOL/L (ref 0.5–2.2)
LEUKOCYTE ESTERASE UR QL STRIP: NEGATIVE
LIPASE SERPL-CCNC: 13 U/L (ref 4–60)
LYMPHOCYTES # BLD AUTO: 2.1 K/UL (ref 1–4.8)
LYMPHOCYTES NFR BLD: 24.1 % (ref 18–48)
MAGNESIUM SERPL-MCNC: 1.8 MG/DL (ref 1.6–2.6)
MCH RBC QN AUTO: 32.7 PG (ref 27–31)
MCHC RBC AUTO-ENTMCNC: 32.1 G/DL (ref 32–36)
MCV RBC AUTO: 102 FL (ref 82–98)
MICROSCOPIC COMMENT: ABNORMAL
MODE: ABNORMAL
MODE: ABNORMAL
MONOCYTES # BLD AUTO: 1.8 K/UL (ref 0.3–1)
MONOCYTES NFR BLD: 20.3 % (ref 4–15)
NEUTROPHILS # BLD AUTO: 4.4 K/UL (ref 1.8–7.7)
NEUTROPHILS NFR BLD: 49.6 % (ref 38–73)
NITRITE UR QL STRIP: NEGATIVE
NRBC BLD-RTO: 2 /100 WBC
PCO2 BLDA: 34.3 MMHG (ref 35–45)
PCO2 BLDA: 46.9 MMHG (ref 35–45)
PEEP: 5
PEEP: 8
PH SMN: 7.31 [PH] (ref 7.35–7.45)
PH SMN: 7.42 [PH] (ref 7.35–7.45)
PH UR STRIP: 6 [PH] (ref 5–8)
PHOSPHATE SERPL-MCNC: 6.4 MG/DL (ref 2.7–4.5)
PLATELET # BLD AUTO: 219 K/UL (ref 150–450)
PMV BLD AUTO: 9.6 FL (ref 9.2–12.9)
PO2 BLDA: 423 MMHG (ref 80–100)
PO2 BLDA: 61 MMHG (ref 80–100)
POC BE: -2 MMOL/L
POC BE: -3 MMOL/L
POC MOLECULAR INFLUENZA A AGN: NEGATIVE
POC MOLECULAR INFLUENZA B AGN: NEGATIVE
POC SATURATED O2: 100 % (ref 95–100)
POC SATURATED O2: 92 % (ref 95–100)
POC TCO2: 23 MMOL/L (ref 23–27)
POC TCO2: 25 MMOL/L (ref 23–27)
POCT GLUCOSE: 144 MG/DL (ref 70–110)
POCT GLUCOSE: 149 MG/DL (ref 70–110)
POCT GLUCOSE: 179 MG/DL (ref 70–110)
POCT GLUCOSE: 187 MG/DL (ref 70–110)
POCT GLUCOSE: 196 MG/DL (ref 70–110)
POCT GLUCOSE: 494 MG/DL (ref 70–110)
POTASSIUM SERPL-SCNC: 3 MMOL/L (ref 3.5–5.1)
POTASSIUM SERPL-SCNC: 3.5 MMOL/L (ref 3.5–5.1)
PROT SERPL-MCNC: 6.8 G/DL (ref 6–8.4)
PROT UR QL STRIP: ABNORMAL
PROTHROMBIN TIME: 12.3 SEC (ref 9–12.5)
RBC # BLD AUTO: 3.52 M/UL (ref 4.6–6.2)
RBC #/AREA URNS HPF: 39 /HPF (ref 0–4)
SAMPLE: ABNORMAL
SAMPLE: ABNORMAL
SARS-COV-2 RDRP RESP QL NAA+PROBE: NEGATIVE
SITE: ABNORMAL
SITE: ABNORMAL
SODIUM SERPL-SCNC: 134 MMOL/L (ref 136–145)
SODIUM SERPL-SCNC: 135 MMOL/L (ref 136–145)
SP GR UR STRIP: 1.01 (ref 1–1.03)
TROPONIN I SERPL DL<=0.01 NG/ML-MCNC: 0.92 NG/ML (ref 0–0.03)
TROPONIN I SERPL DL<=0.01 NG/ML-MCNC: 1.76 NG/ML (ref 0–0.03)
TSH SERPL DL<=0.005 MIU/L-ACNC: 1.31 UIU/ML (ref 0.4–4)
URN SPEC COLLECT METH UR: ABNORMAL
UROBILINOGEN UR STRIP-ACNC: NEGATIVE EU/DL
VT: 550
VT: 600
WBC # BLD AUTO: 8.78 K/UL (ref 3.9–12.7)
WBC #/AREA URNS HPF: 3 /HPF (ref 0–5)

## 2022-02-22 PROCEDURE — 36410 VNPNXR 3YR/> PHY/QHP DX/THER: CPT | Mod: 59

## 2022-02-22 PROCEDURE — U0002 COVID-19 LAB TEST NON-CDC: HCPCS | Performed by: EMERGENCY MEDICINE

## 2022-02-22 PROCEDURE — 25000003 PHARM REV CODE 250: Performed by: INTERNAL MEDICINE

## 2022-02-22 PROCEDURE — 85610 PROTHROMBIN TIME: CPT | Performed by: EMERGENCY MEDICINE

## 2022-02-22 PROCEDURE — 84100 ASSAY OF PHOSPHORUS: CPT | Performed by: EMERGENCY MEDICINE

## 2022-02-22 PROCEDURE — 20000000 HC ICU ROOM

## 2022-02-22 PROCEDURE — 93005 ELECTROCARDIOGRAM TRACING: CPT

## 2022-02-22 PROCEDURE — 85379 FIBRIN DEGRADATION QUANT: CPT | Performed by: EMERGENCY MEDICINE

## 2022-02-22 PROCEDURE — 94760 N-INVAS EAR/PLS OXIMETRY 1: CPT

## 2022-02-22 PROCEDURE — 99291 CRITICAL CARE FIRST HOUR: CPT | Mod: 25

## 2022-02-22 PROCEDURE — 25000003 PHARM REV CODE 250: Performed by: EMERGENCY MEDICINE

## 2022-02-22 PROCEDURE — 84484 ASSAY OF TROPONIN QUANT: CPT | Mod: 91 | Performed by: INTERNAL MEDICINE

## 2022-02-22 PROCEDURE — 31500 INSERT EMERGENCY AIRWAY: CPT | Mod: 59

## 2022-02-22 PROCEDURE — C1751 CATH, INF, PER/CENT/MIDLINE: HCPCS

## 2022-02-22 PROCEDURE — 36415 COLL VENOUS BLD VENIPUNCTURE: CPT | Performed by: INTERNAL MEDICINE

## 2022-02-22 PROCEDURE — 83690 ASSAY OF LIPASE: CPT | Performed by: EMERGENCY MEDICINE

## 2022-02-22 PROCEDURE — 63600175 PHARM REV CODE 636 W HCPCS: Performed by: INTERNAL MEDICINE

## 2022-02-22 PROCEDURE — 85730 THROMBOPLASTIN TIME PARTIAL: CPT | Performed by: EMERGENCY MEDICINE

## 2022-02-22 PROCEDURE — 99900026 HC AIRWAY MAINTENANCE (STAT)

## 2022-02-22 PROCEDURE — 81000 URINALYSIS NONAUTO W/SCOPE: CPT | Performed by: EMERGENCY MEDICINE

## 2022-02-22 PROCEDURE — 63600175 PHARM REV CODE 636 W HCPCS

## 2022-02-22 PROCEDURE — 83880 ASSAY OF NATRIURETIC PEPTIDE: CPT | Performed by: EMERGENCY MEDICINE

## 2022-02-22 PROCEDURE — 83605 ASSAY OF LACTIC ACID: CPT | Performed by: EMERGENCY MEDICINE

## 2022-02-22 PROCEDURE — 83605 ASSAY OF LACTIC ACID: CPT | Mod: 91 | Performed by: INTERNAL MEDICINE

## 2022-02-22 PROCEDURE — 85025 COMPLETE CBC W/AUTO DIFF WBC: CPT | Performed by: EMERGENCY MEDICINE

## 2022-02-22 PROCEDURE — 82803 BLOOD GASES ANY COMBINATION: CPT

## 2022-02-22 PROCEDURE — 93010 ELECTROCARDIOGRAM REPORT: CPT | Mod: 76,,, | Performed by: INTERNAL MEDICINE

## 2022-02-22 PROCEDURE — 93010 EKG 12-LEAD: ICD-10-PCS | Mod: ,,, | Performed by: INTERNAL MEDICINE

## 2022-02-22 PROCEDURE — 83735 ASSAY OF MAGNESIUM: CPT | Performed by: EMERGENCY MEDICINE

## 2022-02-22 PROCEDURE — 99900035 HC TECH TIME PER 15 MIN (STAT)

## 2022-02-22 PROCEDURE — 36600 WITHDRAWAL OF ARTERIAL BLOOD: CPT

## 2022-02-22 PROCEDURE — 84484 ASSAY OF TROPONIN QUANT: CPT | Performed by: EMERGENCY MEDICINE

## 2022-02-22 PROCEDURE — 63600175 PHARM REV CODE 636 W HCPCS: Performed by: EMERGENCY MEDICINE

## 2022-02-22 PROCEDURE — 94002 VENT MGMT INPAT INIT DAY: CPT

## 2022-02-22 PROCEDURE — 36680 INSERT NEEDLE BONE CAVITY: CPT | Mod: 59

## 2022-02-22 PROCEDURE — 80048 BASIC METABOLIC PNL TOTAL CA: CPT | Performed by: INTERNAL MEDICINE

## 2022-02-22 PROCEDURE — 80053 COMPREHEN METABOLIC PANEL: CPT | Performed by: EMERGENCY MEDICINE

## 2022-02-22 PROCEDURE — 93010 ELECTROCARDIOGRAM REPORT: CPT | Mod: ,,, | Performed by: INTERNAL MEDICINE

## 2022-02-22 PROCEDURE — 25500020 PHARM REV CODE 255: Performed by: EMERGENCY MEDICINE

## 2022-02-22 PROCEDURE — S5010 5% DEXTROSE AND 0.45% SALINE: HCPCS | Performed by: INTERNAL MEDICINE

## 2022-02-22 PROCEDURE — 25000003 PHARM REV CODE 250

## 2022-02-22 PROCEDURE — 84443 ASSAY THYROID STIM HORMONE: CPT | Performed by: EMERGENCY MEDICINE

## 2022-02-22 PROCEDURE — 87040 BLOOD CULTURE FOR BACTERIA: CPT | Mod: 59 | Performed by: EMERGENCY MEDICINE

## 2022-02-22 PROCEDURE — 36569 INSJ PICC 5 YR+ W/O IMAGING: CPT

## 2022-02-22 PROCEDURE — 25000003 PHARM REV CODE 250: Performed by: SURGERY

## 2022-02-22 PROCEDURE — 87502 INFLUENZA DNA AMP PROBE: CPT

## 2022-02-22 RX ORDER — ASPIRIN 325 MG
325 TABLET ORAL
Status: COMPLETED | OUTPATIENT
Start: 2022-02-22 | End: 2022-02-22

## 2022-02-22 RX ORDER — DEXTROSE MONOHYDRATE AND SODIUM CHLORIDE 5; .45 G/100ML; G/100ML
INJECTION, SOLUTION INTRAVENOUS CONTINUOUS
Status: DISCONTINUED | OUTPATIENT
Start: 2022-02-22 | End: 2022-02-23

## 2022-02-22 RX ORDER — SODIUM CHLORIDE 9 MG/ML
125 INJECTION, SOLUTION INTRAVENOUS CONTINUOUS
Status: DISCONTINUED | OUTPATIENT
Start: 2022-02-22 | End: 2022-02-22

## 2022-02-22 RX ORDER — NOREPINEPHRINE BITARTRATE/D5W 4MG/250ML
PLASTIC BAG, INJECTION (ML) INTRAVENOUS
Status: COMPLETED
Start: 2022-02-22 | End: 2022-02-22

## 2022-02-22 RX ORDER — ENOXAPARIN SODIUM 100 MG/ML
1 INJECTION SUBCUTANEOUS
Status: DISCONTINUED | OUTPATIENT
Start: 2022-02-23 | End: 2022-02-24

## 2022-02-22 RX ORDER — PROPOFOL 10 MG/ML
0-50 INJECTION, EMULSION INTRAVENOUS CONTINUOUS
Status: DISCONTINUED | OUTPATIENT
Start: 2022-02-22 | End: 2022-02-23

## 2022-02-22 RX ORDER — SUCCINYLCHOLINE CHLORIDE 20 MG/ML
120 INJECTION INTRAMUSCULAR; INTRAVENOUS
Status: COMPLETED | OUTPATIENT
Start: 2022-02-22 | End: 2022-02-22

## 2022-02-22 RX ORDER — FAMOTIDINE 10 MG/ML
20 INJECTION INTRAVENOUS EVERY 12 HOURS
Status: DISCONTINUED | OUTPATIENT
Start: 2022-02-22 | End: 2022-02-28

## 2022-02-22 RX ORDER — CLOPIDOGREL 300 MG/1
300 TABLET, FILM COATED ORAL ONCE
Status: COMPLETED | OUTPATIENT
Start: 2022-02-22 | End: 2022-02-22

## 2022-02-22 RX ORDER — ONDANSETRON 2 MG/ML
4 INJECTION INTRAMUSCULAR; INTRAVENOUS EVERY 6 HOURS PRN
Status: DISCONTINUED | OUTPATIENT
Start: 2022-02-22 | End: 2022-02-23

## 2022-02-22 RX ORDER — ETOMIDATE 2 MG/ML
20 INJECTION INTRAVENOUS
Status: COMPLETED | OUTPATIENT
Start: 2022-02-22 | End: 2022-02-22

## 2022-02-22 RX ORDER — SODIUM CHLORIDE 0.9 % (FLUSH) 0.9 %
10 SYRINGE (ML) INJECTION
Status: DISCONTINUED | OUTPATIENT
Start: 2022-02-22 | End: 2022-02-23

## 2022-02-22 RX ORDER — DEXMEDETOMIDINE HYDROCHLORIDE 4 UG/ML
0-1.4 INJECTION, SOLUTION INTRAVENOUS CONTINUOUS
Status: DISCONTINUED | OUTPATIENT
Start: 2022-02-22 | End: 2022-02-22

## 2022-02-22 RX ORDER — PROPOFOL 10 MG/ML
INJECTION, EMULSION INTRAVENOUS
Status: COMPLETED
Start: 2022-02-22 | End: 2022-02-22

## 2022-02-22 RX ORDER — ENOXAPARIN SODIUM 100 MG/ML
1 INJECTION SUBCUTANEOUS
Status: COMPLETED | OUTPATIENT
Start: 2022-02-22 | End: 2022-02-22

## 2022-02-22 RX ORDER — CLOPIDOGREL BISULFATE 75 MG/1
75 TABLET ORAL DAILY
Status: DISCONTINUED | OUTPATIENT
Start: 2022-02-23 | End: 2022-03-01

## 2022-02-22 RX ORDER — PROPOFOL 10 MG/ML
40 VIAL (ML) INTRAVENOUS ONCE
Status: COMPLETED | OUTPATIENT
Start: 2022-02-22 | End: 2022-02-22

## 2022-02-22 RX ORDER — NAPROXEN SODIUM 220 MG/1
81 TABLET, FILM COATED ORAL DAILY
Status: DISCONTINUED | OUTPATIENT
Start: 2022-02-23 | End: 2022-03-01

## 2022-02-22 RX ORDER — FENTANYL CITRATE-0.9 % NACL/PF 10 MCG/ML
0-250 PLASTIC BAG, INJECTION (ML) INTRAVENOUS CONTINUOUS
Status: DISCONTINUED | OUTPATIENT
Start: 2022-02-22 | End: 2022-02-23

## 2022-02-22 RX ORDER — PROPOFOL 10 MG/ML
20 VIAL (ML) INTRAVENOUS ONCE
Status: COMPLETED | OUTPATIENT
Start: 2022-02-22 | End: 2022-02-22

## 2022-02-22 RX ORDER — POTASSIUM CHLORIDE 7.45 MG/ML
10 INJECTION INTRAVENOUS
Status: COMPLETED | OUTPATIENT
Start: 2022-02-22 | End: 2022-02-22

## 2022-02-22 RX ORDER — CHLORHEXIDINE GLUCONATE ORAL RINSE 1.2 MG/ML
15 SOLUTION DENTAL 2 TIMES DAILY
Status: DISCONTINUED | OUTPATIENT
Start: 2022-02-22 | End: 2022-02-26

## 2022-02-22 RX ORDER — SODIUM CHLORIDE 0.9 % (FLUSH) 0.9 %
10 SYRINGE (ML) INJECTION EVERY 6 HOURS
Status: DISCONTINUED | OUTPATIENT
Start: 2022-02-23 | End: 2022-03-05 | Stop reason: HOSPADM

## 2022-02-22 RX ORDER — ATORVASTATIN CALCIUM 40 MG/1
80 TABLET, FILM COATED ORAL DAILY
Status: DISCONTINUED | OUTPATIENT
Start: 2022-02-22 | End: 2022-03-01

## 2022-02-22 RX ORDER — NOREPINEPHRINE BITARTRATE/D5W 4MG/250ML
0-3 PLASTIC BAG, INJECTION (ML) INTRAVENOUS CONTINUOUS
Status: DISCONTINUED | OUTPATIENT
Start: 2022-02-22 | End: 2022-02-23

## 2022-02-22 RX ORDER — SODIUM CHLORIDE 0.9 % (FLUSH) 0.9 %
10 SYRINGE (ML) INJECTION
Status: DISCONTINUED | OUTPATIENT
Start: 2022-02-22 | End: 2022-03-05 | Stop reason: HOSPADM

## 2022-02-22 RX ORDER — METOPROLOL TARTRATE 1 MG/ML
5 INJECTION, SOLUTION INTRAVENOUS
Status: ACTIVE | OUTPATIENT
Start: 2022-02-22 | End: 2022-02-23

## 2022-02-22 RX ORDER — ACETAMINOPHEN 325 MG/1
650 TABLET ORAL EVERY 4 HOURS PRN
Status: DISCONTINUED | OUTPATIENT
Start: 2022-02-22 | End: 2022-03-05 | Stop reason: HOSPADM

## 2022-02-22 RX ADMIN — PROPOFOL 50 MCG/KG/MIN: 10 INJECTION, EMULSION INTRAVENOUS at 03:02

## 2022-02-22 RX ADMIN — CHLORHEXIDINE GLUCONATE 0.12% ORAL RINSE 15 ML: 1.2 LIQUID ORAL at 08:02

## 2022-02-22 RX ADMIN — PROPOFOL 40 MG: 10 INJECTION, EMULSION INTRAVENOUS at 03:02

## 2022-02-22 RX ADMIN — Medication 0.02 MCG/KG/MIN: at 08:02

## 2022-02-22 RX ADMIN — DEXMEDETOMIDINE HYDROCHLORIDE 1.1 MCG/KG/HR: 400 INJECTION, SOLUTION INTRAVENOUS at 07:02

## 2022-02-22 RX ADMIN — DEXTROSE AND SODIUM CHLORIDE 125 ML/HR: 5; .45 INJECTION, SOLUTION INTRAVENOUS at 07:02

## 2022-02-22 RX ADMIN — FAMOTIDINE 20 MG: 10 INJECTION INTRAVENOUS at 08:02

## 2022-02-22 RX ADMIN — PROPOFOL 10 MCG/KG/MIN: 10 INJECTION, EMULSION INTRAVENOUS at 03:02

## 2022-02-22 RX ADMIN — PROPOFOL 15 MCG/KG/MIN: 10 INJECTION, EMULSION INTRAVENOUS at 07:02

## 2022-02-22 RX ADMIN — SODIUM CHLORIDE 1000 ML: 0.9 INJECTION, SOLUTION INTRAVENOUS at 03:02

## 2022-02-22 RX ADMIN — POTASSIUM CHLORIDE 10 MEQ: 7.46 INJECTION, SOLUTION INTRAVENOUS at 05:02

## 2022-02-22 RX ADMIN — CLOPIDOGREL BISULFATE 300 MG: 300 TABLET, FILM COATED ORAL at 05:02

## 2022-02-22 RX ADMIN — PROPOFOL 20 MG: 10 INJECTION, EMULSION INTRAVENOUS at 03:02

## 2022-02-22 RX ADMIN — SODIUM CHLORIDE 125 ML/HR: 0.9 INJECTION, SOLUTION INTRAVENOUS at 05:02

## 2022-02-22 RX ADMIN — VANCOMYCIN HYDROCHLORIDE 2000 MG: 10 INJECTION, POWDER, LYOPHILIZED, FOR SOLUTION INTRAVENOUS at 02:02

## 2022-02-22 RX ADMIN — SUCCINYLCHOLINE CHLORIDE 120 MG: 20 INJECTION, SOLUTION INTRAMUSCULAR; INTRAVENOUS at 02:02

## 2022-02-22 RX ADMIN — ASPIRIN 325 MG ORAL TABLET 325 MG: 325 PILL ORAL at 05:02

## 2022-02-22 RX ADMIN — Medication 10 ML: at 11:02

## 2022-02-22 RX ADMIN — ETOMIDATE INJECTION 20 MG: 2 SOLUTION INTRAVENOUS at 02:02

## 2022-02-22 RX ADMIN — Medication 49 MCG/HR: at 03:02

## 2022-02-22 RX ADMIN — SODIUM CHLORIDE 1 UNITS/HR: 9 INJECTION, SOLUTION INTRAVENOUS at 05:02

## 2022-02-22 RX ADMIN — NOREPINEPHRINE BITARTRATE 0.02 MCG/KG/MIN: 4 INJECTION, SOLUTION INTRAVENOUS at 08:02

## 2022-02-22 RX ADMIN — ENOXAPARIN SODIUM 100 MG: 100 INJECTION SUBCUTANEOUS at 05:02

## 2022-02-22 RX ADMIN — PIPERACILLIN AND TAZOBACTAM 4.5 G: 4; .5 INJECTION, POWDER, LYOPHILIZED, FOR SOLUTION INTRAVENOUS; PARENTERAL at 05:02

## 2022-02-22 RX ADMIN — IOHEXOL 100 ML: 350 INJECTION, SOLUTION INTRAVENOUS at 06:02

## 2022-02-22 RX ADMIN — PROPOFOL 40 MG: 10 INJECTION, EMULSION INTRAVENOUS at 02:02

## 2022-02-22 RX ADMIN — SODIUM CHLORIDE 500 ML: 0.9 INJECTION, SOLUTION INTRAVENOUS at 09:02

## 2022-02-22 NOTE — ASSESSMENT & PLAN NOTE
Hydrate. Recheck. Will obtain PTH and vitamin D. Does not have renal failure. Will get iCa for more accurate measure

## 2022-02-22 NOTE — HPI
"61 year old male with stage 4 squamous cell cancer of lung of bronchus, former smoker and hypertension who presented with shortness of breath of hours in evolution. Patient is currently intubated and unable to provide history. His wife, who is at bedside, states he was in his normal state up until today. States he had been eating and drinking just fine but did have to hide his "coke" which he drinks in excess. Apparently had "coke" again this AM as he felt that would help his breathing. He is currently on palliative chemotherapy with Gemcitabine, last dose given 2/15/2022 (about a week ago). Is no longer smoking.     In the ED, patient had progressive respiratory distress. Became diaphoretic and confused. Was also pulling oxygen mask. Patient was therefore intubated. Labwork significant for lactic acidosis of 8.7, hyperglycemia, HAGMA, hypokalemia, mild hyponatremia, hyperphosphatemia, BNP 700s, hyaline casts in UA and troponin 0.9. No ST segment elevation or depression. Cardiology and pulmonology consulted. Given fluids, empiric abx and admitted to ICU.   "

## 2022-02-22 NOTE — ASSESSMENT & PLAN NOTE
Loading doses of aspirin and clopidogrel. Statin and full dose anticoagulation  Echocardiogram ordered. Cardiology consulted  BB and ACE-I when able to tolerate

## 2022-02-22 NOTE — Clinical Note
The catheter insertion attempt was made into the ostium   right coronary artery. The catheter was unable to engage the area..

## 2022-02-22 NOTE — ASSESSMENT & PLAN NOTE
Patient with Hypercapnic and Hypoxic Respiratory failure which is Acute.  he is not on home oxygen. Supplemental oxygen was provided and noted- Vent Mode: PRVC  Oxygen Concentration (%):  [] 60  Vt Set:  [550 mL] 550 mL  PEEP/CPAP:  [8 cmH20] 8 cmH20  Mean Airway Pressure:  [15.1 cmH20] 15.1 cmH20.   Signs/symptoms of respiratory failure include- respiratory distress. Contributing diagnoses includes - I suspect cardiogenic pulmonary edema. I also suspect tachypnea to compensate for acidemia Labs and images were reviewed. Patient Has recent ABG, which has been reviewed. Will treat underlying causes and adjust management of respiratory failure as follows  Consult pulmonology for ventilator management. I agree with empiric broad spectrum antibiotics pending infectious workup. Cardiology consulted and echocardiogram ordered for suspected NSTEMI. Treating acidemia with insulin infusion and fluids for suspected DKA. Start stress ulcer prophylaxis.

## 2022-02-22 NOTE — ED PROVIDER NOTES
Encounter Date: 2/22/2022    SCRIBE #1 NOTE: I, Jessi Huitron-Gregorio and am scribing for, and in the presence of, Shania Tran MD.       History     Chief Complaint   Patient presents with    Shortness of Breath     Pt reports SOB since this morning. Patient has stage 4 lung cancer and on active chemo. No oxygen use at home.     61 year old male with a PMHx of stage 4 lung cancer and HTN presents to the ED with shortness of breath that began this morning. The patient reports after waking up he noticed his breathing had worsened when walking or moving but subsided when he sat down. Wife states her son called her this morning saying the patient was struggling to breathe and she told him to go to the ED. The patient also endorses pain in his lower legs when walking. He is currently in chemotherapy for his lung cancer and receives treatment every other week. The patient's last chemotherapy treatment was last week. He recently switched to oncologist Dr. Hess. He denies any cough, fever, chills, chest pain, leg swelling, blood in his stool, or any other associated symptoms. The patient has been vaccinated against COVID-19. He has no known allergies.    I reviewed patient's code status with the patient and his wife at bedside.  Patient is full code.  He does not have advance care directives in the chart.    The history is provided by the patient and the spouse.     Review of patient's allergies indicates:  No Known Allergies  Past Medical History:   Diagnosis Date    Hypertension     Lung cancer     NSCLC    Lung mass     left lung     Past Surgical History:   Procedure Laterality Date    BRONCHOSCOPY N/A 8/30/2019    Procedure: Bronchoscopy;  Surgeon: Dosc Diagnostic Provider;  Location: [facility] OR [address] [place] [address];  Service: Anesthesiology;  Laterality: N/A;     Family History   Problem Relation Age of Onset    Diabetes Mother     Heart defect Mother     Cancer Father     Cancer Sister     No Known Problems Son       Social History     Tobacco Use    Smoking status: Former Smoker     Packs/day: 1.00     Years: 25.00     Pack years: 25.00     Types: Cigarettes     Quit date:      Years since quittin.1    Smokeless tobacco: Never Used   Substance Use Topics    Alcohol use: Yes     Comment: ocassionally    Drug use: Never     Review of Systems   Constitutional: Negative for chills and fever.   HENT: Negative for congestion and sore throat.    Eyes: Negative for visual disturbance.   Respiratory: Positive for shortness of breath. Negative for cough.    Cardiovascular: Negative for chest pain and leg swelling.   Gastrointestinal: Negative for abdominal pain, blood in stool, nausea and vomiting.   Genitourinary: Negative for dysuria.   Musculoskeletal: Negative for back pain, gait problem, joint swelling, neck pain and neck stiffness.        Positive for lower leg pain.   Skin: Negative for rash.   Neurological: Negative for headaches.   Psychiatric/Behavioral: Negative for confusion.       Physical Exam     Initial Vitals [22 1312]   BP Pulse Resp Temp SpO2   (!) 176/105 (!) 136 (!) 24 97.4 °F (36.3 °C) (S) (!) 88 %      MAP       --         Physical Exam    Nursing note and vitals reviewed.  Constitutional: He appears well-developed and well-nourished. He is not diaphoretic. He appears ill. No distress.   Dusky appearance.   HENT:   Head: Normocephalic and atraumatic.   Mouth/Throat: Oropharynx is clear and moist.   Eyes: EOM are normal. Pupils are equal, round, and reactive to light.   Neck: Neck supple.   Cardiovascular: An irregularly irregular rhythm present.  Tachycardia present.    Pulmonary/Chest: Tachypnea noted. He is in respiratory distress. He has rales.   Rales in right lung base. O2 saturation on 3L nasal cannula is 89 and 90. O2 saturation on non rebreather mask is 94.    Abdominal: Abdomen is soft. Bowel sounds are normal.   Musculoskeletal:      Cervical back: Neck supple.      Right lower leg:  2+ Pitting Edema present.      Left lower le+ Pitting Edema present.      Comments: No posterior calf tenderness.     Neurological: He is alert and oriented to person, place, and time.   Skin: Skin is warm and dry.   Psychiatric: He has a normal mood and affect.         ED Course   Intubation    Date/Time: 2022 2:48 PM  Location procedure was performed: [facility] EMERGENCY DEPARTMENT  Performed by: Shania Tran MD  Authorized by: Shania Tran MD   Consent Done: Emergent Situation  Indications: respiratory distress and  respiratory failure  Intubation method: video-assisted  Patient status: paralyzed (RSI)  Preoxygenation: nonrebreather mask  Sedatives: etomidate  Paralytic: succinylcholine  Laryngoscope size: Glide 4  Tube size: 7.5 mm  Tube type: cuffed  Number of attempts: 1  Cords visualized: yes  Post-procedure assessment: chest rise and CO2 detector  Breath sounds: rales/crackles and equal and absent over the epigastrium  Cuff inflated: yes  ETT to lip: 24 cm  Tube secured with: ETT titus  Chest x-ray interpreted by me.  Chest x-ray findings: endotracheal tube in appropriate position  Patient tolerance: Patient tolerated the procedure well with no immediate complications    External Jugular IV    Date/Time: 2022 2:49 PM  Performed by: Shania Tran MD  Authorized by: Shania Tran MD   Consent Done: Emergent Situation  Location (Ext Jugular): Left.  Area Prepped With: Chlorohexidine.  Catheter Size: 18 ga.  Number of attempts: 1 (unsuccessful attempt)    IO Line - Interosseous Needle/Catheter    Date/Time: 2022 2:50 PM  Location procedure was performed: [facility] EMERGENCY DEPARTMENT  Performed by: Shania Tran MD  Authorized by: Shania Tran MD   Consent Done: Emergent Situation  Indications: clinical deterioration, medication administration and rapid vascular access  Local anesthesia used: no    Anesthesia:  Local anesthesia used: no  Preparation: Patient was  prepped and draped in the usual sterile fashion.  Site preparation: chlorhexidine  Insertion site: right proximal tibia  Insertion device: drill device  Insertion: needle was inserted through the bony cortex  Secured with: transparent dressing  Number of attempts: 1  Confirmation method: stability of the needle, easy infusion of fluids and aspiration of blood/marrow  Patient tolerance: Patient tolerated the procedure well with no immediate complications  Critical Care    Date/Time: 2/22/2022 9:44 PM  Performed by: Shania Tran MD  Authorized by: Abbey Conti MD   Total critical care time (exclusive of procedural time) : 0 minutes  Comments: Please put in 70 minutes of critical care due to patient having a high risk of respiratory failure.   Separate from teaching and exclusive of procedure and ekg time  Includes:  Time at bedside  Time reviewing test results  Time discussing case with staff  Time documenting the medical record  Time spent with family members  Time spent with consults  Management          Labs Reviewed   B-TYPE NATRIURETIC PEPTIDE - Abnormal; Notable for the following components:       Result Value     (*)     All other components within normal limits   CBC W/ AUTO DIFFERENTIAL - Abnormal; Notable for the following components:    RBC 3.52 (*)     Hemoglobin 11.5 (*)     Hematocrit 35.8 (*)      (*)     MCH 32.7 (*)     RDW 16.2 (*)     Immature Granulocytes 4.1 (*)     Immature Grans (Abs) 0.36 (*)     Mono # 1.8 (*)     nRBC 2 (*)     Mono % 20.3 (*)     All other components within normal limits   COMPREHENSIVE METABOLIC PANEL - Abnormal; Notable for the following components:    Sodium 134 (*)     Potassium 3.0 (*)     CO2 17 (*)     Glucose 280 (*)     Calcium 8.4 (*)     Albumin 2.9 (*)     Anion Gap 17 (*)     All other components within normal limits   D DIMER, QUANTITATIVE - Abnormal; Notable for the following components:    D-Dimer 5.01 (*)     All other components  within normal limits   TROPONIN I - Abnormal; Notable for the following components:    Troponin I 0.917 (*)     All other components within normal limits   URINALYSIS, REFLEX TO URINE CULTURE - Abnormal; Notable for the following components:    Protein, UA 3+ (*)     Glucose, UA Trace (*)     Occult Blood UA 3+ (*)     All other components within normal limits    Narrative:     Specimen Source->Urine   LACTIC ACID, PLASMA - Abnormal; Notable for the following components:    Lactate (Lactic Acid) 8.7 (*)     All other components within normal limits    Narrative:     Lactic Acid   critical result(s) called and verbal readback obtained   from [place]. by MARILYN 02/22/2022 15:47   PHOSPHORUS - Abnormal; Notable for the following components:    Phosphorus 6.4 (*)     All other components within normal limits   URINALYSIS MICROSCOPIC - Abnormal; Notable for the following components:    RBC, UA 39 (*)     Hyaline Casts, UA 9 (*)     All other components within normal limits    Narrative:     Specimen Source->Urine   ISTAT PROCEDURE - Abnormal; Notable for the following components:    POC PH 7.312 (*)     POC PCO2 46.9 (*)     POC PO2 423 (*)     POC HCO3 23.7 (*)     All other components within normal limits   ISTAT PROCEDURE - Abnormal; Notable for the following components:    POC PCO2 34.3 (*)     POC PO2 61 (*)     POC HCO3 22.2 (*)     POC SATURATED O2 92 (*)     All other components within normal limits   POCT GLUCOSE - Abnormal; Notable for the following components:    POCT Glucose 196 (*)     All other components within normal limits   CULTURE, BLOOD   CULTURE, RESPIRATORY   MAGNESIUM   PROTIME-INR   TSH   APTT   LIPASE   LIPASE   TROPONIN I   SARS-COV-2 RDRP GENE   POCT INFLUENZA A/B MOLECULAR   POCT GLUCOSE MONITORING CONTINUOUS        ECG Results          EKG 12-lead (Final result)  Result time 02/22/22 17:56:38    Final result by Interface, Lab In [facility] (02/22/22 17:56:38)                 Narrative:     Test Reason : R06.02,    Vent. Rate : 104 BPM     Atrial Rate : 104 BPM     P-R Int : 182 ms          QRS Dur : 102 ms      QT Int : 388 ms       P-R-T Axes : 084 114 109 degrees     QTc Int : 510 ms    Sinus tachycardia with Premature atrial complexes and Premature ventricular  complexes or Fusion complexes  Right axis deviation  Incomplete right bundle branch block  Septal infarct ,age undetermined  Abnormal ECG  When compared with ECG of 22-FEB-2022 13:08,  Significant changes have occurred  Confirmed by Robson Green MD ([id number]) on 2/22/2022 5:56:29 PM    Referred By: ALFREDITO   SELF           Confirmed By:Robson Green MD                  ED Interpretation by Shania Tran MD (02/22/22 13:45:14, [facility], Emergency Medicine)    Atrial fibrillation with RVR, ventricular rate 133 beats per minute, QRS duration 98 milliseconds.  No STEMI.  Baseline artifact present an EKG reading.                             EKG 12-lead (Final result)  Result time 02/22/22 17:49:38    Final result by Interface, Lab In [facility] (02/22/22 17:49:38)                 Narrative:    Test Reason : R06.02,    Vent. Rate : 133 BPM     Atrial Rate : 029 BPM     P-R Int : 000 ms          QRS Dur : 098 ms      QT Int : 380 ms       P-R-T Axes : 000 -04 105 degrees     QTc Int : 565 ms    Multifocal atrial tachycardia with premature ventricular or aberrantly  conducted complexes  ST and T wave abnormality, consider lateral ischemia  Abnormal ECG  When compared with ECG of 22-AUG-2019 17:36,  Significant changes have occurred  Confirmed by Robson Green MD ([id number]) on 2/22/2022 5:49:33 PM    Referred By: ALFREDITO   SELF           Confirmed By:Robson Green MD                            Imaging Results          CTA Chest Non-Coronary (PE Study) (Final result)  Result time 02/22/22 19:13:18    Final result by Abelino Finley MD (02/22/22 19:13:18)                 Impression:      1. No evidence of PE.  2. Moderate right pleural  effusion.  3. Occlusion of the left upper lobe bronchus with known left upper and lower lobe lesions with additional associated subsegmental areas of consolidation/atelectasis within the left upper and lower lobes.  4. Additional findings as detailed above.      Electronically signed by: Abelino Finley MD  Date:    02/22/2022  Time:    19:13             Narrative:    EXAMINATION:  CTA CHEST NON CORONARY    CLINICAL HISTORY:  Pulmonary embolism (PE) suspected, high prob;    TECHNIQUE:  Low dose axial images, sagittal and coronal reformations were obtained from the thoracic inlet to the lung bases following the IV administration of 100 mL of Omnipaque 350.  Contrast timing was optimized to evaluate the pulmonary arteries.  MIP images were performed.    COMPARISON:  CT chest abdomen and pelvis from 01/05/2022.    FINDINGS:  Examination was performed in a STAT inpatient setting is not to serve as official restaging exam.    Right-sided chest port is visualized with distal tip in the SVC.  Aorta is non-aneurysmal.  The heart is normal in size without pericardial effusion.  No intraluminal filling defects within the pulmonary arteries to suggest pulmonary thromboembolism.  Enteric tube extends throughout the esophagus into the stomach.    ET tube is visualized with distal tip above the jonnathan.  Occlusion is seen of the left upper lobe bronchus.  Mild bronchiectasis is seen within the bilateral lower lobes.  Patient with history of left upper and lower lobe lesions.  Additional associated subsegmental areas of consolidation/atelectasis are seen within the left upper lobe and medial aspect of the left lower lobe.  Moderate right-sided pleural effusion is seen resulting in volume loss and compressive atelectasis within the right lower lobe.  Emphysematous changes are seen with upper lobe predominance.    The visualized abdominal structures show no acute abnormalities.  No acute osseous abnormality identified.  Extrathoracic  soft tissues are unremarkable.                               X-ray Abdomen for NG Tube Placement (Nursing should notify Radiology after placement) (Final result)  Result time 02/22/22 18:15:49    Final result by Abelino Finley MD (02/22/22 18:15:49)                 Impression:      OG tube in place.      Electronically signed by: Abelino Finley MD  Date:    02/22/2022  Time:    18:15             Narrative:    EXAMINATION:  XR NON-RADIOLOGIST PERFORMED NG/GASTRIC TUBE CHECK    CLINICAL HISTORY:  OG tube placement;    COMPARISON:  14:21.    FINDINGS:  Enteric tube extends well below the diaphragm into the stomach.  No significant change in cardiopulmonary status from earlier chest radiograph.                               X-Ray Chest AP Portable (Final result)  Result time 02/22/22 14:59:15    Final result by Cristofer Botello MD (02/22/22 14:59:15)                 Impression:      New bilateral lung infiltrates.  Inflammatory, infectious, CHF etiologies favored.  The possibility of recurrent neoplasm left hilum cannot be excluded.    Consideration for follow-up CT chest with IV contrast suggested as well as clinical correlation.    Epic abnormal flag.      Electronically signed by: Cristofer Botello MD  Date:    02/22/2022  Time:    14:59             Narrative:    EXAMINATION:  XR CHEST AP PORTABLE    CLINICAL HISTORY:  shortness of breath;    TECHNIQUE:  Single frontal view of the chest was performed.    COMPARISON:  Two thousand nineteen chest x-ray and CT scan chest 01/05/2022    FINDINGS:  New bilateral infiltrates with partial consolidation right perihilar mid lower lung zone, obscuring of right lateral hemidiaphragm margin.  New less prominent infiltrates left perihilar mid lung zone as well as retrocardiac infrahilar left lung base with additional pleural reaction left lateral CP angle and adjacent chest wall.  Heart normal size.  Right IJ venous port stable.  Interval insertion of NG tube and  endotracheal tube with ET tube tip at T6 level satisfactory above level of jonnathan.  Pulmonary vascular tree appears more more prominent and obscured by adjacent infiltrates.                                 Medications   vancomycin - pharmacy to dose (has no administration in time range)   metoprolol injection 5 mg (5 mg Intravenous Not Given 2/22/22 1345)   propofol (DIPRIVAN) 10 mg/mL infusion (15 mcg/kg/min × 106.1 kg Intravenous New Bag 2/22/22 1935)   fentaNYL 2500 mcg in 0.9% sodium chloride 250 mL infusion premix (titrating) (312.5 mcg/hr Intravenous Rate/Dose Change 2/22/22 1707)   vancomycin 1.5 g in dextrose 5 % 250 mL IVPB (ready to mix) (has no administration in time range)   sodium chloride 0.9% flush 10 mL (has no administration in time range)   dextrose 50% injection 25 g (has no administration in time range)   dextrose 50% injection 12.5 g (has no administration in time range)   ondansetron injection 4 mg (has no administration in time range)   acetaminophen tablet 650 mg (has no administration in time range)   famotidine (PF) injection 20 mg (20 mg Intravenous Given 2/22/22 2018)   insulin regular in 0.9 % NaCl 100 unit/100 mL (1 unit/mL) infusion (0.1 Units/hr Intravenous Rate/Dose Change 2/22/22 2043)   chlorhexidine 0.12 % solution 15 mL (15 mLs Mouth/Throat Given 2/22/22 2018)   aspirin chewable tablet 81 mg (has no administration in time range)   clopidogreL tablet 75 mg (has no administration in time range)   atorvastatin tablet 80 mg (has no administration in time range)   piperacillin-tazobactam 4.5 g in dextrose 5 % 100 mL IVPB (ready to mix system) (has no administration in time range)   enoxaparin injection 100 mg (has no administration in time range)   dextrose 5 % and 0.45 % NaCl infusion (125 mL/hr Intravenous New Bag 2/22/22 1923)   sodium chloride 0.9% flush 10 mL (has no administration in time range)     And   sodium chloride 0.9% flush 10 mL (has no administration in time range)    sodium chloride 0.9% bolus 500 mL (has no administration in time range)   NORepinephrine 4 mg in dextrose 5% 250 mL infusion (premix) (titrating) (has no administration in time range)   NORepinephrine bitartrate-D5W 4 mg/250 mL (16 mcg/mL) infusion Soln (has no administration in time range)   sodium chloride 0.9% bolus 1,000 mL (0 mLs Intravenous Stopped 2/22/22 1618)   piperacillin-tazobactam 4.5 g in dextrose 5 % 100 mL IVPB (ready to mix system) (4.5 g Intravenous New Bag 2/22/22 1732)   vancomycin (VANCOCIN) 2,000 mg in dextrose 5 % 500 mL IVPB (0 mg/kg × 106.1 kg Intravenous Stopped 2/22/22 1730)   etomidate injection 20 mg (20 mg Intravenous Given 2/22/22 1408)   succinylcholine injection 120 mg (120 mg Intravenous Given 2/22/22 1400)   propofol (DIPRIVAN) 10 mg/mL IVP (40 mg Intravenous Given by Other 2/22/22 1549)   propofol (DIPRIVAN) 10 mg/mL IVP (20 mg Intravenous Given by Other 2/22/22 1550)   propofol (DIPRIVAN) 10 mg/mL IVP (40 mg Intravenous Given by Other 2/22/22 1551)   propofol (DIPRIVAN) 10 mg/mL IVP (40 mg Intravenous Given by Other 2/22/22 1450)   enoxaparin injection 100 mg (100 mg Subcutaneous Given 2/22/22 1709)   aspirin tablet 325 mg (325 mg Per OG tube Given 2/22/22 1709)   potassium chloride 10 mEq in 100 mL IVPB (0 mEq Intravenous Stopped 2/22/22 1835)   clopidogreL tablet 300 mg (300 mg Per OG tube Given 2/22/22 1709)   iohexoL (OMNIPAQUE 350) injection 100 mL (100 mLs Intravenous Given 2/22/22 1832)     Medical Decision Making:   Initial Assessment:   61-year-old male with history of stage IV lung cancer, currently on chemotherapy, presenting with shortness of breath that started today.  Patient reports significant shortness of breath with any exertion.  Denies cardiac history, denies history of asthma or COPD.  Denies use of home oxygen.  On arrival to the ER, this noted the patient is hypertensive, hypoxic, tachycardic.  On exam, the patient has mild respiratory distress with  tachypnea, there are rales in the right base, he has pitting edema.  He is requiring supplemental oxygen.  Differential is broad at this time and includes but not limited to dysrhythmia, PE, postobstructive pneumonia, electrolyte disturbance, symptomatic anemia.  Workup initiated with labs including cardiac enzymes, CTA chest, chest x-ray, treating with some IV fluids but being cautious as patient has pitting edema no recent echo on chart.  Will cover with broad-spectrum antibiotics.  Will give dose of Lopressor for rapid heart rate and consult Cardiology.  Anticipate admission, likely to ICU.  Code status addressed, patient is full code.  There are no ACP docs in the chart.  ED Management:  The patient was intubated while in the ED.  He had transient episodes of hypotension that responded to decreasing the propofol dose.  As the patient was hypertensive on arrival, I did not feel strongly about giving tPA prophylactically for possible PE as his hypotension improved with medication change.  The care was discussed with Dr. Naif Disla in the patient admitted to the ICU for further care.          Scribe Attestation:   Scribe #1: I performed the above scribed service and the documentation accurately describes the services I performed. I attest to the accuracy of the note.        ED Course as of 02/22/22 2145   Tue Feb 22, 2022   1441 Approximately 5 minutes after assessing the patient, RN called me back to the room for worsening respiratory distress.  The patient was confused, diaphoretic, pulling off the oxygen mask.  Unfortunately, the IV that was placed the patient dislodged during this process and an IO had to be established.  The patient was intubated and placed on propofol and fentanyl.  He had improvement in his sats, lowest was in the 60s, after intubation, O2 sats 90-94%.  He is on 100% FiO2.  I attempted to update his spouse, I believe she stepped outside the building as she is no longer in the family room.  [LH]   1444 Patient's wife, Natty, called and updated. [LH]   1648 Case discussed with Dr. Naif Disla for admission to hospital medicine, will place in ICU.  Patient's blood pressure dropped, I suspect this is related to propofol as when we back off the propofol his blood pressure improves.  Precedex has been added for sedation. [LH]      ED Course User Index  [LH] Shania Tran MD             Clinical Impression:   Final diagnoses:  [R06.02] SOB (shortness of breath)  [R06.02] Shortness of breath  [C34.90] Malignant neoplasm of lung, unspecified laterality, unspecified part of lung (Primary)  [J96.01] Acute respiratory failure with hypoxia  [I48.91] Atrial fibrillation with rapid ventricular response  [R00.0] Tachycardia  [J96.91] Respiratory failure with hypoxia          ED Disposition Condition    Admit               I, Shania Tran MD, personally performed the services described in this documentation. All medical record entries made by the scribe were at my direction and in my presence. I have reviewed the chart and agree that the record reflects my personal performance and is accurate and complete.    This dictation has been generated using M-Modal Fluency Direct dictation; some phonetic errors may occur.          Shania Tran MD  02/22/22 [id number]

## 2022-02-22 NOTE — Clinical Note
68 ml of contrast were injected throughout the case. 82 mL of contrast was the total wasted during the case. 150 mL was the total amount used during the case.

## 2022-02-22 NOTE — Clinical Note
The left DP pulse was 1+. The right DP pulse was detected with doppler.  The left PT pulse was 1+. The right PT pulse was 1+.

## 2022-02-22 NOTE — H&P
"[facility] Medicine  History & Physical    Patient Name: Kashif Iverson  MRN: [id number]  Patient Class: IP- Inpatient  Admission Date: 2/22/2022  Attending Physician: Shania Tran MD   Primary Care Provider: Eduardo Ruiz MD         Patient information was obtained from spouse/SO, past medical records and ER records.     Subjective:     Principal Problem:Acute respiratory failure with hypoxia and hypercapnia    Chief Complaint:   Chief Complaint   Patient presents with    Shortness of Breath     Pt reports SOB since this morning. Patient has stage 4 lung cancer and on active chemo. No oxygen use at home.        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 Patient is currently intubated and unable to provide history. His wife, who is at bedside, states he was in his normal state up until today. States he had been eating and drinking just fine but did have to hide his "coke" which he drinks in excess. Apparently had "coke" again this AM as he felt that would help his breathing. He is currently on palliative chemotherapy with Gemcitabine, last dose given 2/15/2022 (about a week ago). Is no longer smoking.     In the ED, patient had progressive respiratory distress. Became diaphoretic and confused. Was also pulling oxygen mask. Patient was therefore intubated. Labwork significant for lactic acidosis of 8.7, hyperglycemia, HAGMA, hypokalemia, mild hyponatremia, hyperphosphatemia, BNP 700s, hyaline casts in UA and troponin 0.9. No ST segment elevation or depression. Cardiology and pulmonology consulted. Given fluids, empiric abx and admitted to ICU.       Past Medical History:   Diagnosis Date    Hypertension     Lung cancer     NSCLC    Lung mass     left lung       Past Surgical History:   Procedure Laterality Date    BRONCHOSCOPY N/A 8/30/2019    Procedure: " Bronchoscopy;  Surgeon: Erisc Diagnostic Provider;  Location: [facility] OR [address] [place] [address];  Service: Anesthesiology;  Laterality: N/A;       Review of patient's allergies indicates:  No Known Allergies    No current facility-administered medications on file prior to encounter.     Current Outpatient Medications on File Prior to Encounter   Medication Sig    amLODIPine (NORVASC) 10 MG tablet Take 1 tablet (10 mg total) by mouth once daily.    ascorbic acid-multivit-min (VITAMIN C ENERGY BOOSTER) 1,000 mg PwEP Take by mouth. Patient takes 3,000 mg per day    atenoloL (TENORMIN) 50 MG tablet Take 1 tablet (50 mg total) by mouth once daily.    LIDOcaine-prilocaine (EMLA) cream Apply generously to port site 30-60 min prior to chemo and then cover with saran wrap.    ondansetron (ZOFRAN) 8 MG tablet Take 1 tablet (8 mg total) by mouth 4 (four) times daily as needed for Nausea.     Family History       Problem Relation (Age of Onset)    Cancer Father, Sister    Diabetes Mother    Heart defect Mother    No Known Problems Son          Tobacco Use    Smoking status: Former Smoker     Packs/day: 1.00     Years: 25.00     Pack years: 25.00     Types: Cigarettes     Quit date:      Years since quittin.1    Smokeless tobacco: Never Used   Substance and Sexual Activity    Alcohol use: Yes     Comment: ocassionally    Drug use: Never    Sexual activity: Yes     Partners: Female     Review of Systems   Unable to perform ROS: Intubated   Objective:     Vital Signs (Most Recent):  Temp: 97.4 °F (36.3 °C) (22 1312)  Pulse: 74 (22)  Resp: (!) 28 (22)  BP: 91/61 (22)  SpO2: 99 % (22)   Vital Signs (24h Range):  Temp:  [97.4 °F (36.3 °C)] 97.4 °F (36.3 °C)  Pulse:  [] 74  Resp:  [13-43] 28  SpO2:  [73 %-100 %] 99 %  BP: ()/() 91/61     Weight: 98 kg (216 lb)  Body mass index is 30.99 kg/m².    Physical Exam  Vitals and nursing note reviewed.   Constitutional:        General: He is not in acute distress.     Appearance: He is ill-appearing and toxic-appearing.      Comments: Sedated    HENT:      Head: Normocephalic.      Mouth/Throat:      Mouth: Mucous membranes are dry.      Comments: ETT in place  Eyes:      Pupils: Pupils are equal, round, and reactive to light.   Cardiovascular:      Rate and Rhythm: Normal rate and regular rhythm.   Pulmonary:      Breath sounds: Normal breath sounds. No wheezing or rales.      Comments: On mechanical ventilation  Abdominal:      General: There is no distension.      Palpations: Abdomen is soft. There is no mass.   Musculoskeletal:      Right lower leg: No edema.      Left lower leg: No edema.   Skin:     Capillary Refill: Capillary refill takes 2 to 3 seconds.   Neurological:      Comments: Sedated    Psychiatric:      Comments: Unable to assess          CRANIAL NERVES     CN III, IV, VI   Pupils are equal, round, and reactive to light.     Significant Labs: All pertinent labs within the past 24 hours have been reviewed.    Significant Imaging: I have reviewed all pertinent imaging results/findings within the past 24 hours.  I have reviewed and interpreted all pertinent imaging results/findings within the past 24 hours.    Assessment/Plan:     * Acute respiratory failure with hypoxia and hypercapnia  Patient with Hypercapnic and Hypoxic Respiratory failure which is Acute.  he is not on home oxygen. Supplemental oxygen was provided and noted- Vent Mode: PRVC  Oxygen Concentration (%):  [] 60  Vt Set:  [550 mL] 550 mL  PEEP/CPAP:  [8 cmH20] 8 cmH20  Mean Airway Pressure:  [15.1 cmH20] 15.1 cmH20.   Signs/symptoms of respiratory failure include- respiratory distress. Contributing diagnoses includes - I suspect cardiogenic pulmonary edema. I also suspect tachypnea to compensate for acidemia Labs and images were reviewed. Patient Has recent ABG, which has been reviewed. Will treat underlying causes and adjust management of respiratory  failure as follows  Consult pulmonology for ventilator management. I agree with empiric broad spectrum antibiotics pending infectious workup. Cardiology consulted and echocardiogram ordered for suspected NSTEMI. Treating acidemia with insulin infusion and fluids for suspected DKA. Start stress ulcer prophylaxis.     High anion gap metabolic acidosis  Although elevated lactic acid, I suspect DKA as new diagnosis for diabetes given hyperglycemia and reports for excessive consumption of soda at home. HgbA1c pending. On intravenous fluids and insulin infusion. Accuchecks every hour, BMP every 4 hours and replace potassium PRN. Can come off insulin drip once HAGMA resolves.       NSTEMI (non-ST elevated myocardial infarction)  Loading doses of aspirin and clopidogrel. Statin and full dose anticoagulation  Echocardiogram ordered. Cardiology consulted  BB and ACE-I when able to tolerate      Hyperglycemia  I suspect new diagnosis of diabetes      Lactic acidosis  As above. Repeat in 4 hours      ACP (advance care planning)  Advance Care Planning     Date: 02/22/2022    [facility]  I engaged the wife in a conversation about advance care planning and we specifically addressed what the goals of care would be moving forward, in light of the patient's change in clinical status.  We did specifically address the patient's likely prognosis, which is fair .  We explored the patient's values and preferences for future care.  The wife endorses that what is most important right now is to focus on curative/life-prolongation (regardless of treatment burdens)    Accordingly, we have decided that the best plan to meet the patient's goals includes continuing with treatment    I did not explain the role for hospice care at this stage of the patient's illness, including its ability to help the patient live with the best quality of life possible.  We will not be making a hospice referral.    I spent a total of > 15 minutes engaging the patient in  this advance care planning discussion.    Dr Tran states she had a code status conversation with patient prior to intubation. Patient prefers aggressive care and full code. Wife confirms that these are his wishes.              Hyperphosphatemia  Hydrate. Recheck. Will obtain PTH and vitamin D. Does not have renal failure. Will get iCa for more accurate measure      Hypokalemia  Given potassium in the ED. Recheck tonight      Primary hypertension  BP not high. Hold off on antihypertensives      Immunodeficiency due to drugs  Noted. Is not leukopenic or neutropenic      Secondary malignant neoplasm of bone  Noted       Lung cancer, main bronchus, left  Noted. Is stage 4. On palliative chemotherapy. Followed by Dr Hathaway as outpatient      History of completed stroke  Noted       Macrocytic anemia  No indication for transfusion at this time    Former smoker  Per patient's wife, patient has been abstinent for 3 years      VTE Risk Mitigation (From admission, onward)         Ordered     enoxaparin injection 100 mg  Every 12 hours (non-standard times)         02/22/22 6189                 Time spent: > 35 minutes in patient's care    Assessment and plan discussed with patient's wife at bedside.   Abbey Disla MD  Department of Hospital Medicine   [facility] - Emergency Dept

## 2022-02-22 NOTE — Clinical Note
The left DP pulse was 1+. The right DP pulse was detected with doppler.  The PT pulses were 1+ bilaterally.

## 2022-02-22 NOTE — ED TRIAGE NOTES
Patient arrived to the ED via personal transport due to SOB starting this morning at home. Upon arrival to ED, patient SpO2 low 90s on RA, abdominal accessory muscles noted on assessment. Pt alert, oriented x4, reports hx of lung cancer. Pt pale and diaphoretic, immediately placed on 5L NC with no improvement. MD Tran at bedside, pt placed on 15L NRB, SpO2 at 97%. Pt reports breathing is improving. Pt placed on cardiac monitoring, VS stable, EKG obtained. PT has hx of AFIB, H 110-120s. IV attempted for blood work. 10 mins later, pt restless. MD Tran proceeds with intubation. Respiratory at bedside. IO placed in R lower leg. Profolol 40 bolus administered by MD Tran for sedation. While attempting IV access, patient combative, agitated, and restless. Restraints applied to B arms and B legs. Profofol drip started. 20G IV to L AC. OG tube placed, Xray confirmed placement. Lemon placed. Port A Cath accessed for Fentanyl administration. Pt sedated, no acute distress noted.

## 2022-02-22 NOTE — ASSESSMENT & PLAN NOTE
Advance Care Planning     Date: 02/22/2022    [facility]  I engaged the wife in a conversation about advance care planning and we specifically addressed what the goals of care would be moving forward, in light of the patient's change in clinical status.  We did specifically address the patient's likely prognosis, which is fair .  We explored the patient's values and preferences for future care.  The wife endorses that what is most important right now is to focus on curative/life-prolongation (regardless of treatment burdens)    Accordingly, we have decided that the best plan to meet the patient's goals includes continuing with treatment    I did not explain the role for hospice care at this stage of the patient's illness, including its ability to help the patient live with the best quality of life possible.  We will not be making a hospice referral.    I spent a total of > 15 minutes engaging the patient in this advance care planning discussion.    Dr Tran states she had a code status conversation with patient prior to intubation. Patient prefers aggressive care and full code. Wife confirms that these are his wishes.

## 2022-02-22 NOTE — ED NOTES
Propofol decreased according to patient BP 78/47. MD Tran at bedside. MD reports to slowly wean patient of Propofol and start Precedex.

## 2022-02-22 NOTE — SUBJECTIVE & OBJECTIVE
Past Medical History:   Diagnosis Date    Hypertension     Lung cancer     NSCLC    Lung mass     left lung       Past Surgical History:   Procedure Laterality Date    BRONCHOSCOPY N/A 2019    Procedure: Bronchoscopy;  Surgeon: Miguel Diagnostic Provider;  Location: [facility] OR [address] [place] [address];  Service: Anesthesiology;  Laterality: N/A;       Review of patient's allergies indicates:  No Known Allergies    No current facility-administered medications on file prior to encounter.     Current Outpatient Medications on File Prior to Encounter   Medication Sig    amLODIPine (NORVASC) 10 MG tablet Take 1 tablet (10 mg total) by mouth once daily.    ascorbic acid-multivit-min (VITAMIN C ENERGY BOOSTER) 1,000 mg PwEP Take by mouth. Patient takes 3,000 mg per day    atenoloL (TENORMIN) 50 MG tablet Take 1 tablet (50 mg total) by mouth once daily.    LIDOcaine-prilocaine (EMLA) cream Apply generously to port site 30-60 min prior to chemo and then cover with saran wrap.    ondansetron (ZOFRAN) 8 MG tablet Take 1 tablet (8 mg total) by mouth 4 (four) times daily as needed for Nausea.     Family History       Problem Relation (Age of Onset)    Cancer Father, Sister    Diabetes Mother    Heart defect Mother    No Known Problems Son          Tobacco Use    Smoking status: Former Smoker     Packs/day: 1.00     Years: 25.00     Pack years: 25.00     Types: Cigarettes     Quit date:      Years since quittin.1    Smokeless tobacco: Never Used   Substance and Sexual Activity    Alcohol use: Yes     Comment: ocassionally    Drug use: Never    Sexual activity: Yes     Partners: Female     Review of Systems   Unable to perform ROS: Intubated   Objective:     Vital Signs (Most Recent):  Temp: 97.4 °F (36.3 °C) (22 1312)  Pulse: 74 (22)  Resp: (!) 28 (22)  BP: 91/61 (22)  SpO2: 99 % (22)   Vital Signs (24h Range):  Temp:  [97.4 °F (36.3 °C)] 97.4 °F (36.3 °C)  Pulse:  [] 74  Resp:   [13-43] 28  SpO2:  [73 %-100 %] 99 %  BP: ()/() 91/61     Weight: 98 kg (216 lb)  Body mass index is 30.99 kg/m².    Physical Exam  Vitals and nursing note reviewed.   Constitutional:       General: He is not in acute distress.     Appearance: He is ill-appearing and toxic-appearing.      Comments: Sedated    HENT:      Head: Normocephalic.      Mouth/Throat:      Mouth: Mucous membranes are dry.      Comments: ETT in place  Eyes:      Pupils: Pupils are equal, round, and reactive to light.   Cardiovascular:      Rate and Rhythm: Normal rate and regular rhythm.   Pulmonary:      Breath sounds: Normal breath sounds. No wheezing or rales.      Comments: On mechanical ventilation  Abdominal:      General: There is no distension.      Palpations: Abdomen is soft. There is no mass.   Musculoskeletal:      Right lower leg: No edema.      Left lower leg: No edema.   Skin:     Capillary Refill: Capillary refill takes 2 to 3 seconds.   Neurological:      Comments: Sedated    Psychiatric:      Comments: Unable to assess          CRANIAL NERVES     CN III, IV, VI   Pupils are equal, round, and reactive to light.     Significant Labs: All pertinent labs within the past 24 hours have been reviewed.    Significant Imaging: I have reviewed all pertinent imaging results/findings within the past 24 hours.  I have reviewed and interpreted all pertinent imaging results/findings within the past 24 hours.

## 2022-02-22 NOTE — ASSESSMENT & PLAN NOTE
Although elevated lactic acid, I suspect DKA as new diagnosis for diabetes given hyperglycemia and reports for excessive consumption of soda at home. HgbA1c pending. On intravenous fluids and insulin infusion. Accuchecks every hour, BMP every 4 hours and replace potassium PRN. Can come off insulin drip once HAGMA resolves.

## 2022-02-22 NOTE — PROGRESS NOTES
Pharmacokinetic Initial Assessment: IV Vancomycin    Assessment/Plan:    Initiate intravenous vancomycin with loading dose of 2000 mg once followed by a maintenance dose of vancomycin 1500 mg IV every 12 hours  Desired empiric serum trough concentration is 10 to 20 mcg/mL  Draw vancomycin trough level 60 min prior to fourth dose on 2/24/22 at approximately 02:00  Pharmacy will continue to follow and monitor vancomycin.      Please contact pharmacy at extension [phone] with any questions regarding this assessment.     Thank you for the consult,   Jocelin Pérez       Patient brief summary:  Kashif Iverson is a 61 y.o. male initiated on antimicrobial therapy with IV Vancomycin for treatment of suspected  pneumonia.    Drug Allergies:   Review of patient's allergies indicates:  No Known Allergies    Actual Body Weight:   98 kg    Renal Function:   Estimated Creatinine Clearance: 91.1 mL/min (based on SCr of 1 mg/dL).,     Dialysis Method (if applicable):  N/A    CBC (last 72 hours):  Recent Labs   Lab Result Units 02/22/22  1452   WBC K/uL 8.78   Hemoglobin g/dL 11.5*   Hematocrit % 35.8*   Platelets K/uL 219   Gran % % 49.6   Lymph % % 24.1   Mono % % 20.3*   Eosinophil % % 0.5   Basophil % % 1.4   Differential Method  Automated       Metabolic Panel (last 72 hours):  Recent Labs   Lab Result Units 02/22/22  1452 02/22/22  1546   Sodium mmol/L 134*  --    Potassium mmol/L 3.0*  --    Chloride mmol/L 100  --    CO2 mmol/L 17*  --    Glucose mg/dL 280*  --    Glucose, UA   --  Trace*   BUN mg/dL 8  --    Creatinine mg/dL 1.0  --    Albumin g/dL 2.9*  --    Total Bilirubin mg/dL 0.7  --    Alkaline Phosphatase U/L 76  --    AST U/L 34  --    ALT U/L 20  --    Magnesium mg/dL 1.8  --    Phosphorus mg/dL 6.4*  --        Drug levels (last 3 results):  No results for input(s): VANCOMYCINRA, VANCOMYCINPE, VANCOMYCINTR in the last 72 hours.    Microbiologic Results:  Microbiology Results (last 7 days)       Procedure Component  Value Units Date/Time    Blood culture x two cultures. Draw prior to antibiotics. [[id number]] Collected: 02/22/22 1448    Order Status: Sent Specimen: Blood from Peripheral, Antecubital, Left Updated: 02/22/22 1519    Blood culture x two cultures. Draw prior to antibiotics. [[id number]] Collected: 02/22/22 1448    Order Status: Sent Specimen: Blood from Peripheral, Antecubital, Left Updated: 02/22/22 1500

## 2022-02-23 ENCOUNTER — ANESTHESIA (OUTPATIENT)
Dept: INTENSIVE CARE | Facility: HOSPITAL | Age: 62
DRG: 208 | End: 2022-02-23
Payer: MEDICARE

## 2022-02-23 ENCOUNTER — ANESTHESIA EVENT (OUTPATIENT)
Dept: INTENSIVE CARE | Facility: HOSPITAL | Age: 62
DRG: 208 | End: 2022-02-23
Payer: MEDICARE

## 2022-02-23 PROBLEM — I50.21 ACUTE SYSTOLIC HEART FAILURE: Status: ACTIVE | Noted: 2022-02-23

## 2022-02-23 PROBLEM — I47.19 MULTIFOCAL ATRIAL TACHYCARDIA: Status: ACTIVE | Noted: 2022-02-23

## 2022-02-23 PROBLEM — Z51.5 PALLIATIVE CARE ENCOUNTER: Status: ACTIVE | Noted: 2022-02-22

## 2022-02-23 LAB
ALBUMIN SERPL BCP-MCNC: 2.4 G/DL (ref 3.5–5.2)
ALBUMIN SERPL BCP-MCNC: 2.6 G/DL (ref 3.5–5.2)
ALLENS TEST: ABNORMAL
ALP SERPL-CCNC: 57 U/L (ref 55–135)
ALP SERPL-CCNC: 67 U/L (ref 55–135)
ALT SERPL W/O P-5'-P-CCNC: 18 U/L (ref 10–44)
ALT SERPL W/O P-5'-P-CCNC: 19 U/L (ref 10–44)
ANION GAP SERPL CALC-SCNC: 10 MMOL/L (ref 8–16)
ANION GAP SERPL CALC-SCNC: 11 MMOL/L (ref 8–16)
ANION GAP SERPL CALC-SCNC: 11 MMOL/L (ref 8–16)
AORTIC ROOT ANNULUS: 3.92 CM
AORTIC VALVE CUSP SEPERATION: 2.31 CM
ASCENDING AORTA: 3.08 CM
AST SERPL-CCNC: 29 U/L (ref 10–40)
AST SERPL-CCNC: 29 U/L (ref 10–40)
AV INDEX (PROSTH): 0.66
AV MEAN GRADIENT: 3 MMHG
AV PEAK GRADIENT: 5 MMHG
AV VALVE AREA: 2.95 CM2
AV VELOCITY RATIO: 0.63
BASOPHILS # BLD AUTO: 0.07 K/UL (ref 0–0.2)
BASOPHILS NFR BLD: 0.9 % (ref 0–1.9)
BILIRUB SERPL-MCNC: 0.7 MG/DL (ref 0.1–1)
BILIRUB SERPL-MCNC: 0.8 MG/DL (ref 0.1–1)
BSA FOR ECHO PROCEDURE: 2.29 M2
BUN SERPL-MCNC: 10 MG/DL (ref 8–23)
BUN SERPL-MCNC: 9 MG/DL (ref 8–23)
BUN SERPL-MCNC: 9 MG/DL (ref 8–23)
CA-I BLDV-SCNC: 1.08 MMOL/L (ref 1.06–1.42)
CALCIUM SERPL-MCNC: 7.5 MG/DL (ref 8.7–10.5)
CALCIUM SERPL-MCNC: 7.8 MG/DL (ref 8.7–10.5)
CALCIUM SERPL-MCNC: 8.2 MG/DL (ref 8.7–10.5)
CHLORIDE SERPL-SCNC: 101 MMOL/L (ref 95–110)
CHLORIDE SERPL-SCNC: 102 MMOL/L (ref 95–110)
CHLORIDE SERPL-SCNC: 105 MMOL/L (ref 95–110)
CHOLEST SERPL-MCNC: 125 MG/DL (ref 120–199)
CHOLEST/HDLC SERPL: 4.8 {RATIO} (ref 2–5)
CO2 SERPL-SCNC: 20 MMOL/L (ref 23–29)
CO2 SERPL-SCNC: 21 MMOL/L (ref 23–29)
CO2 SERPL-SCNC: 21 MMOL/L (ref 23–29)
CREAT SERPL-MCNC: 0.9 MG/DL (ref 0.5–1.4)
CREAT SERPL-MCNC: 1 MG/DL (ref 0.5–1.4)
CREAT SERPL-MCNC: 1 MG/DL (ref 0.5–1.4)
CV ECHO LV RWT: 0.6 CM
DELSYS: ABNORMAL
DIFFERENTIAL METHOD: ABNORMAL
DOP CALC AO PEAK VEL: 1.11 M/S
DOP CALC AO VTI: 21.02 CM
DOP CALC LVOT AREA: 4.5 CM2
DOP CALC LVOT DIAMETER: 2.39 CM
DOP CALC LVOT PEAK VEL: 0.7 M/S
DOP CALC LVOT STROKE VOLUME: 62.1 CM3
DOP CALCLVOT PEAK VEL VTI: 13.85 CM
E WAVE DECELERATION TIME: 222.66 MSEC
E/A RATIO: 1.21
E/E' RATIO: 9.27 M/S
ECHO LV POSTERIOR WALL: 1.34 CM (ref 0.6–1.1)
EJECTION FRACTION: 35 %
EOSINOPHIL # BLD AUTO: 0 K/UL (ref 0–0.5)
EOSINOPHIL NFR BLD: 0.5 % (ref 0–8)
ERYTHROCYTE [DISTWIDTH] IN BLOOD BY AUTOMATED COUNT: 16.4 % (ref 11.5–14.5)
ERYTHROCYTE [SEDIMENTATION RATE] IN BLOOD BY WESTERGREN METHOD: 24 MM/H
EST. GFR  (AFRICAN AMERICAN): >60 ML/MIN/1.73 M^2
EST. GFR  (NON AFRICAN AMERICAN): >60 ML/MIN/1.73 M^2
ESTIMATED AVG GLUCOSE: 97 MG/DL (ref 68–131)
FIO2: 50
FRACTIONAL SHORTENING: 15 % (ref 28–44)
GLUCOSE SERPL-MCNC: 139 MG/DL (ref 70–110)
GLUCOSE SERPL-MCNC: 162 MG/DL (ref 70–110)
GLUCOSE SERPL-MCNC: 178 MG/DL (ref 70–110)
HBA1C MFR BLD: 5 % (ref 4–5.6)
HCO3 UR-SCNC: 21 MMOL/L (ref 24–28)
HCT VFR BLD AUTO: 27.2 % (ref 40–54)
HDLC SERPL-MCNC: 26 MG/DL (ref 40–75)
HDLC SERPL: 20.8 % (ref 20–50)
HGB BLD-MCNC: 9.3 G/DL (ref 14–18)
IMM GRANULOCYTES # BLD AUTO: 0.11 K/UL (ref 0–0.04)
IMM GRANULOCYTES NFR BLD AUTO: 1.4 % (ref 0–0.5)
INTERVENTRICULAR SEPTUM: 1.33 CM (ref 0.6–1.1)
IVRT: 283.74 MSEC
LA MAJOR: 5.89 CM
LA MINOR: 6.03 CM
LA WIDTH: 4.99 CM
LDLC SERPL CALC-MCNC: 54.2 MG/DL (ref 63–159)
LEFT ATRIUM SIZE: 4.42 CM
LEFT ATRIUM VOLUME INDEX: 51.5 ML/M2
LEFT ATRIUM VOLUME: 111.72 CM3
LEFT INTERNAL DIMENSION IN SYSTOLE: 3.8 CM (ref 2.1–4)
LEFT VENTRICLE DIASTOLIC VOLUME INDEX: 42.49 ML/M2
LEFT VENTRICLE DIASTOLIC VOLUME: 92.2 ML
LEFT VENTRICLE MASS INDEX: 106 G/M2
LEFT VENTRICLE SYSTOLIC VOLUME INDEX: 28.6 ML/M2
LEFT VENTRICLE SYSTOLIC VOLUME: 61.96 ML
LEFT VENTRICULAR INTERNAL DIMENSION IN DIASTOLE: 4.49 CM (ref 3.5–6)
LEFT VENTRICULAR MASS: 230.68 G
LV LATERAL E/E' RATIO: 7.29 M/S
LV SEPTAL E/E' RATIO: 12.75 M/S
LYMPHOCYTES # BLD AUTO: 2.5 K/UL (ref 1–4.8)
LYMPHOCYTES NFR BLD: 31.2 % (ref 18–48)
MAGNESIUM SERPL-MCNC: 1.6 MG/DL (ref 1.6–2.6)
MCH RBC QN AUTO: 32.9 PG (ref 27–31)
MCHC RBC AUTO-ENTMCNC: 34.2 G/DL (ref 32–36)
MCV RBC AUTO: 96 FL (ref 82–98)
MIN VOL: 13.2
MODE: ABNORMAL
MONOCYTES # BLD AUTO: 1.5 K/UL (ref 0.3–1)
MONOCYTES NFR BLD: 18.2 % (ref 4–15)
MV PEAK A VEL: 0.42 M/S
MV PEAK E VEL: 0.51 M/S
MV STENOSIS PRESSURE HALF TIME: 64.57 MS
MV VALVE AREA P 1/2 METHOD: 3.41 CM2
NEUTROPHILS # BLD AUTO: 3.8 K/UL (ref 1.8–7.7)
NEUTROPHILS NFR BLD: 47.8 % (ref 38–73)
NONHDLC SERPL-MCNC: 99 MG/DL
NRBC BLD-RTO: 1 /100 WBC
PCO2 BLDA: 31.5 MMHG (ref 35–45)
PEEP: 5
PH SMN: 7.43 [PH] (ref 7.35–7.45)
PHOSPHATE SERPL-MCNC: 3.7 MG/DL (ref 2.7–4.5)
PIP: 27
PISA TR MAX VEL: 2.33 M/S
PLATELET # BLD AUTO: 168 K/UL (ref 150–450)
PMV BLD AUTO: 9.8 FL (ref 9.2–12.9)
PO2 BLDA: 107 MMHG (ref 80–100)
POC BE: -3 MMOL/L
POC SATURATED O2: 98 % (ref 95–100)
POC TCO2: 22 MMOL/L (ref 23–27)
POCT GLUCOSE: 112 MG/DL (ref 70–110)
POCT GLUCOSE: 113 MG/DL (ref 70–110)
POCT GLUCOSE: 133 MG/DL (ref 70–110)
POCT GLUCOSE: 143 MG/DL (ref 70–110)
POCT GLUCOSE: 181 MG/DL (ref 70–110)
POCT GLUCOSE: 185 MG/DL (ref 70–110)
POCT GLUCOSE: 192 MG/DL (ref 70–110)
POTASSIUM SERPL-SCNC: 2.5 MMOL/L (ref 3.5–5.1)
POTASSIUM SERPL-SCNC: 2.6 MMOL/L (ref 3.5–5.1)
POTASSIUM SERPL-SCNC: 3.4 MMOL/L (ref 3.5–5.1)
PROCALCITONIN SERPL IA-MCNC: 0.45 NG/ML
PROT SERPL-MCNC: 5.5 G/DL (ref 6–8.4)
PROT SERPL-MCNC: 6 G/DL (ref 6–8.4)
PTH-INTACT SERPL-MCNC: 450.2 PG/ML (ref 9–77)
PULM VEIN S/D RATIO: 1.04
PV PEAK D VEL: 0.28 M/S
PV PEAK S VEL: 0.29 M/S
PV PEAK VELOCITY: 0.75 CM/S
RA MAJOR: 5.73 CM
RA WIDTH: 4.32 CM
RBC # BLD AUTO: 2.83 M/UL (ref 4.6–6.2)
RIGHT VENTRICULAR END-DIASTOLIC DIMENSION: 4.46 CM
RV TISSUE DOPPLER FREE WALL SYSTOLIC VELOCITY 1 (APICAL 4 CHAMBER VIEW): 9.12 CM/S
SAMPLE: ABNORMAL
SINUS: 3.83 CM
SITE: ABNORMAL
SODIUM SERPL-SCNC: 133 MMOL/L (ref 136–145)
SODIUM SERPL-SCNC: 133 MMOL/L (ref 136–145)
SODIUM SERPL-SCNC: 136 MMOL/L (ref 136–145)
SP02: 100
STJ: 2.92 CM
TDI LATERAL: 0.07 M/S
TDI SEPTAL: 0.04 M/S
TDI: 0.06 M/S
TR MAX PG: 22 MMHG
TRICUSPID ANNULAR PLANE SYSTOLIC EXCURSION: 1.56 CM
TRIGL SERPL-MCNC: 224 MG/DL (ref 30–150)
TROPONIN I SERPL DL<=0.01 NG/ML-MCNC: 1.02 NG/ML (ref 0–0.03)
VT: 580
WBC # BLD AUTO: 8.01 K/UL (ref 3.9–12.7)

## 2022-02-23 PROCEDURE — 80048 BASIC METABOLIC PNL TOTAL CA: CPT | Performed by: INTERNAL MEDICINE

## 2022-02-23 PROCEDURE — 84145 PROCALCITONIN (PCT): CPT | Performed by: SURGERY

## 2022-02-23 PROCEDURE — 99291 PR CRITICAL CARE, E/M 30-74 MINUTES: ICD-10-PCS | Mod: 25,,, | Performed by: INTERNAL MEDICINE

## 2022-02-23 PROCEDURE — 31500 AD HOC INTUBATION: ICD-10-PCS | Mod: ,,, | Performed by: ANESTHESIOLOGY

## 2022-02-23 PROCEDURE — 99291 CRITICAL CARE FIRST HOUR: CPT | Mod: 25,,, | Performed by: INTERNAL MEDICINE

## 2022-02-23 PROCEDURE — 84100 ASSAY OF PHOSPHORUS: CPT | Performed by: INTERNAL MEDICINE

## 2022-02-23 PROCEDURE — 31500 INSERT EMERGENCY AIRWAY: CPT | Mod: ,,, | Performed by: ANESTHESIOLOGY

## 2022-02-23 PROCEDURE — 63600175 PHARM REV CODE 636 W HCPCS: Performed by: ANESTHESIOLOGY

## 2022-02-23 PROCEDURE — 93010 ELECTROCARDIOGRAM REPORT: CPT | Mod: ,,, | Performed by: INTERNAL MEDICINE

## 2022-02-23 PROCEDURE — 85025 COMPLETE CBC W/AUTO DIFF WBC: CPT | Performed by: INTERNAL MEDICINE

## 2022-02-23 PROCEDURE — 83735 ASSAY OF MAGNESIUM: CPT | Performed by: INTERNAL MEDICINE

## 2022-02-23 PROCEDURE — 63600175 PHARM REV CODE 636 W HCPCS

## 2022-02-23 PROCEDURE — 93005 ELECTROCARDIOGRAM TRACING: CPT

## 2022-02-23 PROCEDURE — 83036 HEMOGLOBIN GLYCOSYLATED A1C: CPT | Performed by: INTERNAL MEDICINE

## 2022-02-23 PROCEDURE — 25000003 PHARM REV CODE 250: Performed by: ANESTHESIOLOGY

## 2022-02-23 PROCEDURE — 94761 N-INVAS EAR/PLS OXIMETRY MLT: CPT

## 2022-02-23 PROCEDURE — 25000003 PHARM REV CODE 250: Performed by: INTERNAL MEDICINE

## 2022-02-23 PROCEDURE — 99900026 HC AIRWAY MAINTENANCE (STAT)

## 2022-02-23 PROCEDURE — 83970 ASSAY OF PARATHORMONE: CPT | Performed by: INTERNAL MEDICINE

## 2022-02-23 PROCEDURE — 99291 PR CRITICAL CARE, E/M 30-74 MINUTES: ICD-10-PCS | Mod: ,,, | Performed by: INTERNAL MEDICINE

## 2022-02-23 PROCEDURE — 94003 VENT MGMT INPAT SUBQ DAY: CPT

## 2022-02-23 PROCEDURE — 84484 ASSAY OF TROPONIN QUANT: CPT | Performed by: INTERNAL MEDICINE

## 2022-02-23 PROCEDURE — 36600 WITHDRAWAL OF ARTERIAL BLOOD: CPT

## 2022-02-23 PROCEDURE — 99900035 HC TECH TIME PER 15 MIN (STAT)

## 2022-02-23 PROCEDURE — 99222 1ST HOSP IP/OBS MODERATE 55: CPT | Mod: GT,,, | Performed by: NURSE PRACTITIONER

## 2022-02-23 PROCEDURE — 63600175 PHARM REV CODE 636 W HCPCS: Performed by: INTERNAL MEDICINE

## 2022-02-23 PROCEDURE — 80053 COMPREHEN METABOLIC PANEL: CPT | Performed by: INTERNAL MEDICINE

## 2022-02-23 PROCEDURE — 99291 CRITICAL CARE FIRST HOUR: CPT | Mod: ,,, | Performed by: INTERNAL MEDICINE

## 2022-02-23 PROCEDURE — 94002 VENT MGMT INPAT INIT DAY: CPT

## 2022-02-23 PROCEDURE — A4216 STERILE WATER/SALINE, 10 ML: HCPCS | Performed by: INTERNAL MEDICINE

## 2022-02-23 PROCEDURE — 93010 EKG 12-LEAD: ICD-10-PCS | Mod: ,,, | Performed by: INTERNAL MEDICINE

## 2022-02-23 PROCEDURE — 63600175 PHARM REV CODE 636 W HCPCS: Performed by: SURGERY

## 2022-02-23 PROCEDURE — 25000003 PHARM REV CODE 250: Performed by: SURGERY

## 2022-02-23 PROCEDURE — 93010 ELECTROCARDIOGRAM REPORT: CPT | Mod: 76,,, | Performed by: INTERNAL MEDICINE

## 2022-02-23 PROCEDURE — 80061 LIPID PANEL: CPT | Performed by: INTERNAL MEDICINE

## 2022-02-23 PROCEDURE — 82330 ASSAY OF CALCIUM: CPT | Performed by: INTERNAL MEDICINE

## 2022-02-23 PROCEDURE — 85347 COAGULATION TIME ACTIVATED: CPT

## 2022-02-23 PROCEDURE — 27000221 HC OXYGEN, UP TO 24 HOURS

## 2022-02-23 PROCEDURE — 20000000 HC ICU ROOM

## 2022-02-23 PROCEDURE — 99222 PR INITIAL HOSPITAL CARE,LEVL II: ICD-10-PCS | Mod: GT,,, | Performed by: NURSE PRACTITIONER

## 2022-02-23 RX ORDER — POTASSIUM CHLORIDE 29.8 MG/ML
40 INJECTION INTRAVENOUS
Status: DISCONTINUED | OUTPATIENT
Start: 2022-02-23 | End: 2022-02-26

## 2022-02-23 RX ORDER — INSULIN ASPART 100 [IU]/ML
0-5 INJECTION, SOLUTION INTRAVENOUS; SUBCUTANEOUS EVERY 4 HOURS PRN
Status: DISCONTINUED | OUTPATIENT
Start: 2022-02-23 | End: 2022-02-26

## 2022-02-23 RX ORDER — FUROSEMIDE 10 MG/ML
60 INJECTION INTRAMUSCULAR; INTRAVENOUS DAILY
Status: DISCONTINUED | OUTPATIENT
Start: 2022-02-23 | End: 2022-02-24

## 2022-02-23 RX ORDER — POTASSIUM CHLORIDE 29.8 MG/ML
80 INJECTION INTRAVENOUS
Status: DISCONTINUED | OUTPATIENT
Start: 2022-02-23 | End: 2022-02-26

## 2022-02-23 RX ORDER — LABETALOL HYDROCHLORIDE 5 MG/ML
10 INJECTION, SOLUTION INTRAVENOUS EVERY 4 HOURS PRN
Status: DISCONTINUED | OUTPATIENT
Start: 2022-02-23 | End: 2022-02-25

## 2022-02-23 RX ORDER — LORAZEPAM 2 MG/ML
2 INJECTION INTRAMUSCULAR ONCE
Status: COMPLETED | OUTPATIENT
Start: 2022-02-23 | End: 2022-02-23

## 2022-02-23 RX ORDER — ALPRAZOLAM 0.5 MG/1
0.5 TABLET ORAL NIGHTLY PRN
Status: DISCONTINUED | OUTPATIENT
Start: 2022-02-23 | End: 2022-02-23

## 2022-02-23 RX ORDER — MAGNESIUM SULFATE HEPTAHYDRATE 40 MG/ML
2 INJECTION, SOLUTION INTRAVENOUS
Status: DISCONTINUED | OUTPATIENT
Start: 2022-02-23 | End: 2022-02-26

## 2022-02-23 RX ORDER — SODIUM CHLORIDE AND POTASSIUM CHLORIDE 150; 900 MG/100ML; MG/100ML
INJECTION, SOLUTION INTRAVENOUS CONTINUOUS
Status: DISCONTINUED | OUTPATIENT
Start: 2022-02-23 | End: 2022-02-23

## 2022-02-23 RX ORDER — GLUCAGON 1 MG
1 KIT INJECTION
Status: DISCONTINUED | OUTPATIENT
Start: 2022-02-23 | End: 2022-02-26

## 2022-02-23 RX ORDER — MUPIROCIN 20 MG/G
OINTMENT TOPICAL 2 TIMES DAILY
Status: COMPLETED | OUTPATIENT
Start: 2022-02-23 | End: 2022-02-27

## 2022-02-23 RX ORDER — SUCCINYLCHOLINE CHLORIDE 20 MG/ML
INJECTION INTRAMUSCULAR; INTRAVENOUS
Status: DISCONTINUED | OUTPATIENT
Start: 2022-02-23 | End: 2022-02-24 | Stop reason: HOSPADM

## 2022-02-23 RX ORDER — LORAZEPAM 2 MG/ML
INJECTION INTRAMUSCULAR
Status: COMPLETED
Start: 2022-02-23 | End: 2022-02-23

## 2022-02-23 RX ORDER — DEXMEDETOMIDINE HYDROCHLORIDE 4 UG/ML
0-1.4 INJECTION, SOLUTION INTRAVENOUS CONTINUOUS
Status: DISCONTINUED | OUTPATIENT
Start: 2022-02-24 | End: 2022-02-26

## 2022-02-23 RX ORDER — PROPOFOL 10 MG/ML
INJECTION, EMULSION INTRAVENOUS
Status: COMPLETED
Start: 2022-02-23 | End: 2022-02-24

## 2022-02-23 RX ORDER — POTASSIUM CHLORIDE 14.9 MG/ML
60 INJECTION INTRAVENOUS
Status: DISCONTINUED | OUTPATIENT
Start: 2022-02-23 | End: 2022-02-26

## 2022-02-23 RX ORDER — ETOMIDATE 2 MG/ML
INJECTION INTRAVENOUS
Status: DISCONTINUED | OUTPATIENT
Start: 2022-02-23 | End: 2022-02-24 | Stop reason: HOSPADM

## 2022-02-23 RX ORDER — MAGNESIUM SULFATE HEPTAHYDRATE 40 MG/ML
4 INJECTION, SOLUTION INTRAVENOUS
Status: DISCONTINUED | OUTPATIENT
Start: 2022-02-23 | End: 2022-02-26

## 2022-02-23 RX ADMIN — ASPIRIN 81 MG CHEWABLE TABLET 81 MG: 81 TABLET CHEWABLE at 08:02

## 2022-02-23 RX ADMIN — MUPIROCIN: 20 OINTMENT TOPICAL at 08:02

## 2022-02-23 RX ADMIN — Medication 10 ML: at 06:02

## 2022-02-23 RX ADMIN — ETOMIDATE 12 MG: 2 INJECTION, SOLUTION INTRAVENOUS at 11:02

## 2022-02-23 RX ADMIN — POTASSIUM CHLORIDE 80 MEQ: 29.8 INJECTION, SOLUTION INTRAVENOUS at 02:02

## 2022-02-23 RX ADMIN — NOREPINEPHRINE BITARTRATE 0.05 MCG/KG/MIN: 4 INJECTION, SOLUTION INTRAVENOUS at 10:02

## 2022-02-23 RX ADMIN — CLOPIDOGREL 75 MG: 75 TABLET, FILM COATED ORAL at 08:02

## 2022-02-23 RX ADMIN — PIPERACILLIN AND TAZOBACTAM 4.5 G: 4; .5 INJECTION, POWDER, LYOPHILIZED, FOR SOLUTION INTRAVENOUS; PARENTERAL at 05:02

## 2022-02-23 RX ADMIN — SODIUM CHLORIDE AND POTASSIUM CHLORIDE 100 ML/HR: 9; 1.49 INJECTION, SOLUTION INTRAVENOUS at 04:02

## 2022-02-23 RX ADMIN — VANCOMYCIN HYDROCHLORIDE 1500 MG: 1.5 INJECTION, POWDER, LYOPHILIZED, FOR SOLUTION INTRAVENOUS at 02:02

## 2022-02-23 RX ADMIN — Medication 125 MCG/HR: at 05:02

## 2022-02-23 RX ADMIN — LORAZEPAM 2 MG: 2 INJECTION INTRAMUSCULAR at 11:02

## 2022-02-23 RX ADMIN — PIPERACILLIN AND TAZOBACTAM 4.5 G: 4; .5 INJECTION, POWDER, LYOPHILIZED, FOR SOLUTION INTRAVENOUS; PARENTERAL at 01:02

## 2022-02-23 RX ADMIN — VANCOMYCIN HYDROCHLORIDE 1500 MG: 1.5 INJECTION, POWDER, LYOPHILIZED, FOR SOLUTION INTRAVENOUS at 03:02

## 2022-02-23 RX ADMIN — Medication 10 ML: at 12:02

## 2022-02-23 RX ADMIN — ATORVASTATIN CALCIUM 80 MG: 40 TABLET, FILM COATED ORAL at 08:02

## 2022-02-23 RX ADMIN — CHLORHEXIDINE GLUCONATE 0.12% ORAL RINSE 15 ML: 1.2 LIQUID ORAL at 08:02

## 2022-02-23 RX ADMIN — Medication 10 ML: at 05:02

## 2022-02-23 RX ADMIN — LORAZEPAM 2 MG: 2 INJECTION INTRAMUSCULAR; INTRAVENOUS at 11:02

## 2022-02-23 RX ADMIN — PIPERACILLIN AND TAZOBACTAM 4.5 G: 4; .5 INJECTION, POWDER, LYOPHILIZED, FOR SOLUTION INTRAVENOUS; PARENTERAL at 08:02

## 2022-02-23 RX ADMIN — ENOXAPARIN SODIUM 100 MG: 100 INJECTION SUBCUTANEOUS at 05:02

## 2022-02-23 RX ADMIN — FAMOTIDINE 20 MG: 10 INJECTION INTRAVENOUS at 08:02

## 2022-02-23 RX ADMIN — FUROSEMIDE 60 MG: 10 INJECTION, SOLUTION INTRAMUSCULAR; INTRAVENOUS at 08:02

## 2022-02-23 RX ADMIN — SUCCINYLCHOLINE CHLORIDE 100 MG: 20 INJECTION, SOLUTION INTRAMUSCULAR; INTRAVENOUS at 11:02

## 2022-02-23 RX ADMIN — DEXMEDETOMIDINE HYDROCHLORIDE 0.3 MCG/KG/HR: 400 INJECTION, SOLUTION INTRAVENOUS at 11:02

## 2022-02-23 NOTE — EICU
eICU Physician Virtual/Remote Brief Evaluation Note      Message from RN  Please check chest x-ray for PICC line position  Chart reviewed  PICC line tip overlying right atrium   PICC may be used for pressors and other medications, however, it should be withdrawn 4 cm and chest x-ray repeated      KAYLA Jimenez MD  [facility] Attending  [phone]    This report has been created through the use of [employer] dictation software. Typographical and content errors may occur with this process. While efforts are made to detect and correct such errors, in some cases errors will persist. For this reason, wording in this document should be considered in the proper context and not strictly verbatim

## 2022-02-23 NOTE — PROCEDURES
"Kashif Iverson is a 61 y.o. male patient.    Temp: 97.4 °F (36.3 °C) (02/22/22 1312)  Pulse: 68 (02/22/22 1752)  Resp: (!) 28 (02/22/22 1752)  BP: 95/65 (02/22/22 1752)  SpO2: 100 % (02/22/22 1752)  Weight: 99.8 kg (220 lb 0.3 oz) (02/22/22 1900)  Height: 5' 10" (177.8 cm) (02/22/22 1312)    PICC  Date/Time: 2/22/2022 8:20 PM  Performed by: Sergio Cary RN  Consent Done: Yes  Time out: Immediately prior to procedure a time out was called to verify the correct patient, procedure, equipment, support staff and site/side marked as required  Indications: med administration and vascular access  Anesthesia: local infiltration  Local anesthetic: lidocaine 1% without epinephrine  Anesthetic Total (mL): 5  Preparation: skin prepped with ChloraPrep  Skin prep agent dried: skin prep agent completely dried prior to procedure  Sterile barriers: all five maximum sterile barriers used - cap, mask, sterile gown, sterile gloves, and large sterile sheet  Hand hygiene: hand hygiene performed prior to central venous catheter insertion  Location details: right brachial  Catheter type: triple lumen  Catheter size: 5 Fr  Catheter Length: 40cm    Ultrasound guidance: yes  Vessel Caliber: medium, compressibility normal  Needle advanced into vessel with real time Ultrasound guidance.  Guidewire confirmed in vessel.  Sterile sheath used.  Number of attempts: 2 (see notes)  Post-procedure: blood return through all ports, chlorhexidine patch and sterile dressing applied    Comments: Attempted left arm first bc right chestwall port a cath in place. Upon cxr picc wouldn't thread down SVC despite multiple attempts and new guidewire. I removed it and attempted right arm, very hard to visualize on cxr, If picc tip is not in correct location, please consult anesthesia for TLC.           Name sergio cary  2/22/2022  "

## 2022-02-23 NOTE — CONSULTS
[facility] - Intensive Care  Pulmonology  Consult Note    Patient Name: Kashif Iverson  MRN: [id number]  Admission Date: 2022  Hospital Length of Stay: 1 days  Code Status: Full Code  Attending Physician: Abbey Conti MD  Primary Care Provider: Eduardo Ruiz MD   Principal Problem: Acute respiratory failure with hypoxia and hypercapnia    [unfilled]  Subjective:     HPI:  Patient is 61 y.o. male  has a past medical history of Hypertension, Lung cancer, and Lung mass. presented to [facility] on 22 with sob.   In the ED, patient had progressive respiratory distress. Became diaphoretic and confused.  Patient was therefore intubated.  Pulmonary was consulted for vent management.      During my initial evaluation, patient in intubated and sedated.  VSS with levophed.  Upon cessation of sedation, patient was alert and interactive.      Initial blood works showed significant for lactic acidosis of 8.7, hyperglycemia, HAGMA, hypokalemia, mild hyponatremia, hyperphosphatemia, BNP 700s, hyaline casts in UA and troponin 0.9.         Past Medical History:   Diagnosis Date    Hypertension     Lung cancer     NSCLC    Lung mass     left lung       Past Surgical History:   Procedure Laterality Date    BRONCHOSCOPY N/A 2019    Procedure: Bronchoscopy;  Surgeon: Miguel Diagnostic Provider;  Location: [facility] OR [address] [place] [address];  Service: Anesthesiology;  Laterality: N/A;       Review of patient's allergies indicates:  No Known Allergies    Family History       Problem Relation (Age of Onset)    Cancer Father, Sister    Diabetes Mother    Heart defect Mother    No Known Problems Son          Tobacco Use    Smoking status: Former Smoker     Packs/day: 1.00     Years: 25.00     Pack years: 25.00     Types: Cigarettes     Quit date:      Years since quittin.1    Smokeless tobacco: Never Used   Substance and Sexual Activity    Alcohol use: Yes     Comment: ocassionally    Drug use: Never    Sexual activity: Yes      Partners: Female         Review of Systems   Unable to perform ROS: Intubated   Objective:     Vital Signs (Most Recent):  Temp: 97.7 °F (36.5 °C) (02/23/22 0445)  Pulse: (!) 51 (02/23/22 0845)  Resp: (!) 24 (02/23/22 0845)  BP: (!) 91/59 (02/23/22 0845)  SpO2: 100 % (02/23/22 0845)   Vital Signs (24h Range):  Temp:  [97.4 °F (36.3 °C)-97.7 °F (36.5 °C)] 97.7 °F (36.5 °C)  Pulse:  [] 51  Resp:  [5-43] 24  SpO2:  [73 %-100 %] 100 %  BP: ()/() 91/59     Weight: 99.8 kg (220 lb 0.3 oz)  Body mass index is 31.57 kg/m².      Intake/Output Summary (Last 24 hours) at 2/23/2022 0850  Last data filed at 2/23/2022 0600  Gross per 24 hour   Intake 4163.61 ml   Output 550 ml   Net 3613.61 ml       Physical Exam  Constitutional:       Appearance: He is well-developed.   HENT:      Head: Normocephalic and atraumatic.   Eyes:      Conjunctiva/sclera: Conjunctivae normal.      Pupils: Pupils are equal, round, and reactive to light.   Cardiovascular:      Rate and Rhythm: Normal rate and regular rhythm.      Heart sounds: Normal heart sounds.   Pulmonary:      Effort: Pulmonary effort is normal.      Breath sounds: Normal breath sounds.   Abdominal:      General: Bowel sounds are normal.      Palpations: Abdomen is soft.   Musculoskeletal:         General: Normal range of motion.      Cervical back: Normal range of motion and neck supple.   Skin:     General: Skin is warm.   Neurological:      Cranial Nerves: No cranial nerve deficit.      Comments: Sedated on vent.       Vents:  Vent Mode: PRVC (02/23/22 0406)  Ventilator Initiated: Yes (02/22/22 1430)  Set Rate: 28 BPM (02/23/22 0406)  Vt Set: 580 mL (02/23/22 0406)  PEEP/CPAP: 5 cmH20 (02/23/22 0406)  Oxygen Concentration (%): 50 (02/23/22 0845)  Peak Airway Pressure: 22.7 cmH2O (02/23/22 0406)  Total Ve: 15.73 mL (02/23/22 0406)  F/VT Ratio<105 (RSBI): (!) 49.63 (02/23/22 0406)    Lines/Drains/Airways       Peripherally Inserted Central Catheter Line   Duration             PICC Triple Lumen 02/22/22 2020 right brachial <1 day              Central Venous Catheter Line  Duration             Port A Cath Single Lumen right subclavian -- days              Drain  Duration                  Urethral Catheter 02/22/22 1530 Straight-tip 16 Fr. <1 day              Airway  Duration                  Airway - Non-Surgical 02/22/22 1410 Endotracheal Tube <1 day              Peripheral Intravenous Line  Duration                  Peripheral IV - Single Lumen 02/22/22 1400 20 G Anterior;Distal;Left Upper Arm <1 day         Peripheral IV - Single Lumen 02/22/22 1725 20 G Right Hand <1 day         Peripheral IV - Single Lumen 02/22/22 1737 22 G Right Upper Arm <1 day                    Significant Labs:    CBC/Anemia Profile:  Recent Labs   Lab 02/22/22  1452 02/23/22  0407   WBC 8.78 8.01   HGB 11.5* 9.3*   HCT 35.8* 27.2*    168   * 96   RDW 16.2* 16.4*        Chemistries:  Recent Labs   Lab 02/22/22  1452 02/22/22 1910 02/23/22  0129 02/23/22  0407   * 135* 133* 133*   K 3.0* 3.5 2.5* 2.6*    102 101 102   CO2 17* 19* 21* 20*   BUN 8 8 9 9   CREATININE 1.0 0.9 0.9 1.0   CALCIUM 8.4* 7.8* 7.8* 7.5*   ALBUMIN 2.9*  --   --  2.4*   PROT 6.8  --   --  5.5*   BILITOT 0.7  --   --  0.7   ALKPHOS 76  --   --  57   ALT 20  --   --  19   AST 34  --   --  29   MG 1.8  --   --  1.6   PHOS 6.4*  --   --  3.7       ABGs:   Recent Labs   Lab 02/22/22  1632   PH 7.419   PCO2 34.3*   HCO3 22.2*   POCSATURATED 92*   BE -2     Cardiac Markers: No results for input(s): CKMB, TROPONINT, MYOGLOBIN in the last 48 hours.  Lactic Acid:   Recent Labs   Lab 02/22/22  1454 02/22/22 1910   LACTATE 8.7* 2.6*     Respiratory Culture: No results for input(s): GSRESP, RESPIRATORYC in the last 48 hours.  Troponin:   Recent Labs   Lab 02/22/22  1452 02/22/22  2222 02/23/22  0407   TROPONINI 0.917* 1.759* 1.018*     Urine Culture: No results for input(s): LABURIN in the last 48  hours.    Echo 2/23/22  · The left ventricle is normal in size with concentric hypertrophy and moderately decreased systolic function.  · The estimated ejection fraction is 35-40%.  · Grade I left ventricular diastolic dysfunction.  · Mild right ventricular enlargement with normal right ventricular systolic function.  · Mild left atrial enlargement.  · Mild right atrial enlargement.  · Mild tricuspid regurgitation.      Significant Imaging:   I have reviewed all pertinent imaging results/findings within the past 24 hours.  CT: I have reviewed all pertinent results/findings within the past 24 hours and my personal findings are:  2/22/22 no central filling defect.  Moderate right effusion.  Atelectasis in georges and lll.  Improved aeration when compared to 8/30/19 chest ct  CXR: I have reviewed all pertinent results/findings within the past 24 hours and my personal findings are:  2/22/22 blunting of left costophrenic angle.  Stranding opacification georges.  Much improved when compared to 8/22/19 cxr      ABG  Recent Labs   Lab 02/23/22  0933   PH 7.433   PO2 107*   PCO2 31.5*   HCO3 21.0*   BE -3     Assessment/Plan:     * Acute respiratory failure with hypoxia and hypercapnia  -intubated due to agitation and hypoxemia  -cxr without detrimental changes when compared to prior cxr.  CT with georges and lll atelectasis from underlying lung disease.  Overall, left lung aeration is better when compared to 2019 imaging.  -new right effusion.  Suspect secondary to volume overload with HFrEF  -will hold ivf and start a trial of lasix.    -mildly elevated procal.  Currently on vanco/zosyn.  Unable to collect sputum.  May consider 5 days course.    -sbt with plan to extubate.      Acute systolic heart failure  -NSTEM with EF of 35%.  -asa and plavix.    -card consulted.      ACP (advance care planning)  -full code per Dr. Disla  -h/o metastatic malignancy undergoing palliative chemo with Dr. Gonsalez  -await palliative care inputs.      High  anion gap metabolic acidosis  -initial AG was 17 due to lactic acidosis.  AG has normalized.      NSTEMI (non-ST elevated myocardial infarction)  -see heart failure    Lung cancer, main bronchus, left  -Metastatic squamous cell carcinoma of lung diagnosed in 2019.  S/p palliative chemo.    -repeat imaging showed improve aeration.            Thank you for your consult. I will follow-up with patient. Please contact us if you have any additional questions.     Vivek Adair MD  Pulmonology  [facility] [place] - Intensive Care    Critical Care Time: 45  minutes  Critical secondary to respiratory failure     Critical care was time spent personally by me on the following activities: development of treatment plan with patient or surrogate and bedside caregivers, discussions with consultants, evaluation of patient's response to treatment, examination of patient, ordering and performing treatments and interventions, ordering and review of laboratory studies, ordering and review of radiographic studies, pulse oximetry, re-evaluation of patient's condition.  This critical care time did not overlap with that of any other provider or involve time for any procedures.

## 2022-02-23 NOTE — HPI
"61 year old male with stage 4 squamous cell cancer of lung of bronchus, former smoker and hypertension who presented with shortness of breath of hours in evolution. Patient is currently intubated and unable to provide history. His wife, who is at bedside, states he was in his normal state up until today. States he had been eating and drinking just fine but did have to hide his "coke" which he drinks in excess. Apparently had "coke" again this AM as he felt that would help his breathing. He is currently on palliative chemotherapy with Gemcitabine, last dose given 2/15/2022 (about a week ago). Is no longer smoking.      In the ED, patient had progressive respiratory distress. Became diaphoretic and confused. Was also pulling oxygen mask. Patient was therefore intubated. Labwork significant for lactic acidosis of 8.7, hyperglycemia, HAGMA, hypokalemia, mild hyponatremia, hyperphosphatemia, BNP 700s, hyaline casts in UA and troponin 0.9. No ST segment elevation or depression. Cardiology and pulmonology consulted. Given fluids, empiric abx and admitted to ICU.         Overview/Hospital Course:  Mr Iverson presented with acute respiratory failure with hypercapnia/hypoxia. Intubated in the ED. Started on broad spectrum antibiotics. Blood and resp cultures obtained. No pneumothorax, large consolidation or pulmonary embolism but with moderate right sided pleural effusion and known left main bronchial cancerous mass. Given fluids. Lactic acidosis resolved. Questionable diabetic ketoacidosis given hyperglycemia and high anion gap metabolic acidosis. Placed on insulin infusion. Also started on full dose anticoagulation, double antiplatelet therapy and statin for suspected NSTEMI. Cardiology and pulmonology consulted.     Intubated and sedated in ICU  EKG  NSSTT changes. Currently sinus bradycardia 56 with prolonged QT  Troponin peak 1.7 now trending down    Lactate 8.7 to 2.6  No prior cardiac Hx from chart    Echo " 2/23/22  The left ventricle is normal in size with concentric hypertrophy and moderately decreased systolic function.  The estimated ejection fraction is 35-40%.  Grade I left ventricular diastolic dysfunction.  Mild right ventricular enlargement with normal right ventricular systolic function.  Mild left atrial enlargement.  Mild right atrial enlargement.  Mild tricuspid regurgitation.

## 2022-02-23 NOTE — ASSESSMENT & PLAN NOTE
-intubated due to agitation and hypoxemia  -cxr without detrimental changes when compared to prior cxr.  CT with georges and lll atelectasis from underlying lung disease.  Overall, left lung aeration is better when compared to 2019 imaging.  -new right effusion.  Suspect secondary to volume overload with HFrEF  -will hold ivf and start a trial of lasix.    -mildly elevated procal.  Currently on vanco/zosyn.  Unable to collect sputum.  May consider 5 days course.    -sbt with plan to extubate.

## 2022-02-23 NOTE — HPI
Patient is 61 y.o. male  has a past medical history of Hypertension, Lung cancer, and Lung mass. presented to [facility] on 2/22/22 with sob.   In the ED, patient had progressive respiratory distress. Became diaphoretic and confused.  Patient was therefore intubated.  Pulmonary was consulted for vent management.      During my initial evaluation, patient in intubated and sedated.  VSS with levophed.  Upon cessation of sedation, patient was alert and interactive.      Initial blood works showed significant for lactic acidosis of 8.7, hyperglycemia, HAGMA, hypokalemia, mild hyponatremia, hyperphosphatemia, BNP 700s, hyaline casts in UA and troponin 0.9.

## 2022-02-23 NOTE — SUBJECTIVE & OBJECTIVE
Past Medical History:   Diagnosis Date    Hypertension     Lung cancer     NSCLC    Lung mass     left lung       Past Surgical History:   Procedure Laterality Date    BRONCHOSCOPY N/A 2019    Procedure: Bronchoscopy;  Surgeon: Miguel Diagnostic Provider;  Location: [facility] OR [address] [place] [address];  Service: Anesthesiology;  Laterality: N/A;       Review of patient's allergies indicates:  No Known Allergies    No current facility-administered medications on file prior to encounter.     Current Outpatient Medications on File Prior to Encounter   Medication Sig    amLODIPine (NORVASC) 10 MG tablet Take 1 tablet (10 mg total) by mouth once daily.    ascorbic acid-multivit-min (VITAMIN C ENERGY BOOSTER) 1,000 mg PwEP Take by mouth. Patient takes 3,000 mg per day    atenoloL (TENORMIN) 50 MG tablet Take 1 tablet (50 mg total) by mouth once daily.    LIDOcaine-prilocaine (EMLA) cream Apply generously to port site 30-60 min prior to chemo and then cover with saran wrap.    ondansetron (ZOFRAN) 8 MG tablet Take 1 tablet (8 mg total) by mouth 4 (four) times daily as needed for Nausea.     Family History       Problem Relation (Age of Onset)    Cancer Father, Sister    Diabetes Mother    Heart defect Mother    No Known Problems Son          Tobacco Use    Smoking status: Former Smoker     Packs/day: 1.00     Years: 25.00     Pack years: 25.00     Types: Cigarettes     Quit date:      Years since quittin.1    Smokeless tobacco: Never Used   Substance and Sexual Activity    Alcohol use: Yes     Comment: ocassionally    Drug use: Never    Sexual activity: Yes     Partners: Female     Review of Systems   Unable to perform ROS: Intubated   Objective:     Vital Signs (Most Recent):  Temp: 96.8 °F (36 °C) (22 0715)  Pulse: (!) 48 (22 1040)  Resp: 17 (22 1040)  BP: 105/60 (22 1030)  SpO2: 100 % (22 1040)   Vital Signs (24h Range):  Temp:  [96.8 °F (36 °C)-97.7 °F (36.5 °C)] 96.8 °F (36 °C)  Pulse:  []  48  Resp:  [5-53] 17  SpO2:  [73 %-100 %] 100 %  BP: ()/() 105/60     Weight: 99.8 kg (220 lb 0.3 oz)  Body mass index is 31.57 kg/m².    SpO2: 100 %  O2 Device (Oxygen Therapy): ventilator      Intake/Output Summary (Last 24 hours) at 2/23/2022 1104  Last data filed at 2/23/2022 0600  Gross per 24 hour   Intake 4163.61 ml   Output 550 ml   Net 3613.61 ml       Lines/Drains/Airways       Peripherally Inserted Central Catheter Line  Duration             PICC Triple Lumen 02/22/22 2020 right brachial <1 day              Central Venous Catheter Line  Duration             Port A Cath Single Lumen right subclavian -- days              Drain  Duration                  Urethral Catheter 02/22/22 1530 Straight-tip 16 Fr. <1 day              Airway  Duration                  Airway - Non-Surgical 02/22/22 1410 Endotracheal Tube <1 day              Peripheral Intravenous Line  Duration                  Peripheral IV - Single Lumen 02/22/22 1400 20 G Anterior;Distal;Left Upper Arm <1 day         Peripheral IV - Single Lumen 02/22/22 1725 20 G Right Hand <1 day         Peripheral IV - Single Lumen 02/22/22 1737 22 G Right Upper Arm <1 day                    Physical Exam  Constitutional:       Appearance: He is well-developed.   HENT:      Head: Normocephalic and atraumatic.   Eyes:      Conjunctiva/sclera: Conjunctivae normal.      Pupils: Pupils are equal, round, and reactive to light.   Cardiovascular:      Rate and Rhythm: Normal rate.      Pulses: Intact distal pulses.      Heart sounds: Normal heart sounds.   Pulmonary:      Effort: Pulmonary effort is normal.      Breath sounds: Rales present.   Abdominal:      General: Bowel sounds are normal.      Palpations: Abdomen is soft.   Musculoskeletal:         General: Normal range of motion.      Cervical back: Normal range of motion and neck supple.   Skin:     General: Skin is warm and dry.       Significant Labs: All pertinent lab results from the last 24 hours  have been reviewed.    Significant Imaging: Echocardiogram: 2D echo with color flow doppler: No results found for this or any previous visit.

## 2022-02-23 NOTE — EICU
eICU Physician Virtual/Remote Brief Evaluation Note      Telephone call RN  K 2.5 and patient having PVCs  Anion gap closed and on insulin drip at 0.1  Patient intubated with gastric tube  Chart reviewed, discussed with RN   Creatinine 0.9, glucose 162, bicarb 21  Patient with central venous access  Critical care electrolyte replacement protocol for central venous catheter ordered  Insulin drip DC and q.4h SSI ordered      KAYLA Jimenez MD  eICU Attending  [phone]    This report has been created through the use of M-Modal dictation software. Typographical and content errors may occur with this process. While efforts are made to detect and correct such errors, in some cases errors will persist. For this reason, wording in this document should be considered in the proper context and not strictly verbatim

## 2022-02-23 NOTE — ASSESSMENT & PLAN NOTE
-full code per Dr. Disla  -h/o metastatic malignancy undergoing palliative chemo with Dr. Gonsalez  -await palliative care inputs.

## 2022-02-23 NOTE — ASSESSMENT & PLAN NOTE
-Metastatic squamous cell carcinoma of lung diagnosed in 2019.  S/p palliative chemo.    -repeat imaging showed improve aeration.

## 2022-02-23 NOTE — SUBJECTIVE & OBJECTIVE
Interval History: patient just extubated and on O2 BNC. He is alert and oriented but  voice weak. He reports his breathing is fine     Past Medical History:   Diagnosis Date    Hypertension     Lung cancer     NSCLC    Lung mass     left lung       Past Surgical History:   Procedure Laterality Date    BRONCHOSCOPY N/A 2019    Procedure: Bronchoscopy;  Surgeon: Miguel Diagnostic Provider;  Location: [facility] OR [address] [place] [address];  Service: Anesthesiology;  Laterality: N/A;       Review of patient's allergies indicates:  No Known Allergies    Medications:  Continuous Infusions:  Scheduled Meds:   aspirin  81 mg Per OG tube Daily    atorvastatin  80 mg Per OG tube Daily    chlorhexidine  15 mL Mouth/Throat BID    clopidogreL  75 mg Per OG tube Daily    enoxaparin  1 mg/kg Subcutaneous Q12H    famotidine (PF)  20 mg Intravenous Q12H    furosemide (LASIX) injection  60 mg Intravenous Daily    mupirocin   Nasal BID    piperacillin-tazobactam (ZOSYN) IVPB  4.5 g Intravenous Q8H    sodium chloride 0.9%  10 mL Intravenous Q6H    vancomycin (VANCOCIN) IVPB  15 mg/kg Intravenous Q12H     PRN Meds:acetaminophen, calcium gluconate IVPB, calcium gluconate IVPB, calcium gluconate IVPB, dextrose 50%, dextrose 50%, glucagon (human recombinant), insulin aspart U-100, magnesium sulfate IVPB, magnesium sulfate IVPB, potassium chloride in water **AND** potassium chloride in water **AND** potassium chloride in water, Flushing PICC Protocol **AND** sodium chloride 0.9% **AND** sodium chloride 0.9%, sodium phosphate IVPB, Pharmacy to dose Vancomycin consult **AND** vancomycin - pharmacy to dose    Family History       Problem Relation (Age of Onset)    Cancer Father, Sister    Diabetes Mother    Heart defect Mother    No Known Problems Son          Tobacco Use    Smoking status: Former Smoker     Packs/day: 1.00     Years: 25.00     Pack years: 25.00     Types: Cigarettes     Quit date:      Years since quittin.1    Smokeless tobacco: Never  Used   Substance and Sexual Activity    Alcohol use: Yes     Comment: ocassionally    Drug use: Never    Sexual activity: Yes     Partners: Female       Review of Systems   Constitutional:  Positive for activity change and appetite change.   Respiratory:  Positive for shortness of breath. Negative for cough.    Cardiovascular:  Negative for chest pain.   Neurological:  Positive for weakness.   Objective:     Vital Signs (Most Recent):  Temp: 96.8 °F (36 °C) (02/23/22 1115)  Pulse: 67 (02/23/22 1400)  Resp: (!) 24 (02/23/22 1400)  BP: (!) 113/56 (02/23/22 1400)  SpO2: (!) 92 % (02/23/22 1400)   Vital Signs (24h Range):  Temp:  [96.8 °F (36 °C)-97.7 °F (36.5 °C)] 96.8 °F (36 °C)  Pulse:  [] 67  Resp:  [5-53] 24  SpO2:  [87 %-100 %] 92 %  BP: ()/(35-94) 113/56     Weight: 99.8 kg (220 lb 0.3 oz)  Body mass index is 31.57 kg/m².    Physical Exam  Vitals and nursing note reviewed.   Constitutional:       General: He is not in acute distress.     Appearance: He is obese. He is ill-appearing. He is not toxic-appearing.   HENT:      Head: Normocephalic and atraumatic.      Mouth/Throat:      Mouth: Mucous membranes are dry.   Cardiovascular:      Rate and Rhythm: Normal rate and regular rhythm.   Pulmonary:      Effort: Pulmonary effort is normal.      Comments: Oxygen bnc  Abdominal:      General: Abdomen is flat.      Palpations: Abdomen is soft.   Musculoskeletal:      Right lower leg: No edema.      Left lower leg: No edema.   Skin:     General: Skin is warm and dry.   Neurological:      Mental Status: He is alert and oriented to person, place, and time.   Psychiatric:         Mood and Affect: Mood normal.         Behavior: Behavior normal.       Review of Symptoms      Symptom Assessment (ESAS 0-10 Scale)  Pain:  0  Dyspnea:  0  Anxiety:  0  Nausea:  0  Depression:  0  Anorexia:  0  Fatigue:  0  Insomnia:  0  Restlessness:  0  Agitation:  0       Advance Care Planning   Advance Care Planning        Significant Labs: All pertinent labs within the past 24 hours have been reviewed.  CBC:   Recent Labs   Lab 02/23/22 0407   WBC 8.01   HGB 9.3*   HCT 27.2*   MCV 96        BMP:  Recent Labs   Lab 02/23/22  0407 02/23/22  1258   * 139*   * 136   K 2.6* 3.4*    105   CO2 20* 21*   BUN 9 10   CREATININE 1.0 1.0   CALCIUM 7.5* 8.2*   MG 1.6  --      LFT:  Lab Results   Component Value Date    AST 29 02/23/2022    ALKPHOS 67 02/23/2022    BILITOT 0.8 02/23/2022     Albumin:   Albumin   Date Value Ref Range Status   02/23/2022 2.6 (L) 3.5 - 5.2 g/dL Final     Protein:   Total Protein   Date Value Ref Range Status   02/23/2022 6.0 6.0 - 8.4 g/dL Final     Lactic acid:   Lab Results   Component Value Date    LACTATE 2.6 (H) 02/22/2022    LACTATE 8.7 (HH) 02/22/2022       Significant Imaging: I have reviewed all pertinent imaging results/findings within the past 24 hours.

## 2022-02-23 NOTE — ASSESSMENT & PLAN NOTE
Loading doses of aspirin and clopidogrel given. Statin and full dose anticoagulation  Echocardiogram ordered. Cardiology consulted  BB and ACE-I when able to tolerate

## 2022-02-23 NOTE — ASSESSMENT & PLAN NOTE
Peak troponin 1.7. Suspect demand ischemia from tachycardia, respiratory distress and acidosis. Echo with EF 35-40% ? Tachymyopathy. Will discuss ischemic evaluation when more stable

## 2022-02-23 NOTE — CONSULTS
"[facility] - Intensive Care  Cardiology  Consult Note    Patient Name: Kashif Iverson  MRN: [id number]  Admission Date: 2/22/2022  Hospital Length of Stay: 1 days  Code Status: Full Code   Attending Provider: Abbey Conti MD   Consulting Provider: Robson Green MD  Primary Care Physician: Eduardo Ruiz MD  Principal Problem:Acute respiratory failure with hypoxia and hypercapnia    Patient information was obtained from ER records.     Consults  Subjective:     Chief Complaint:  Elevated troponin. 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 Patient is currently intubated and unable to provide history. His wife, who is at bedside, states he was in his normal state up until today. States he had been eating and drinking just fine but did have to hide his "coke" which he drinks in excess. Apparently had "coke" again this AM as he felt that would help his breathing. He is currently on palliative chemotherapy with Gemcitabine, last dose given 2/15/2022 (about a week ago). Is no longer smoking.      In the ED, patient had progressive respiratory distress. Became diaphoretic and confused. Was also pulling oxygen mask. Patient was therefore intubated. Labwork significant for lactic acidosis of 8.7, hyperglycemia, HAGMA, hypokalemia, mild hyponatremia, hyperphosphatemia, BNP 700s, hyaline casts in UA and troponin 0.9. No ST segment elevation or depression. Cardiology and pulmonology consulted. Given fluids, empiric abx and admitted to ICU.         Overview/Hospital Course:  Mr Iverson presented with acute respiratory failure with hypercapnia/hypoxia. Intubated in the ED. Started on broad spectrum antibiotics. Blood and resp cultures obtained. No pneumothorax, large consolidation or pulmonary embolism but with moderate right sided pleural effusion and known left main bronchial cancerous " mass. Given fluids. Lactic acidosis resolved. Questionable diabetic ketoacidosis given hyperglycemia and high anion gap metabolic acidosis. Placed on insulin infusion. Also started on full dose anticoagulation, double antiplatelet therapy and statin for suspected NSTEMI. Cardiology and pulmonology consulted.     Intubated and sedated in ICU  EKG  NSSTT changes. Currently sinus bradycardia 56 with prolonged QT  Troponin peak 1.7 now trending down    Lactate 8.7 to 2.6  No prior cardiac Hx from chart    Echo 2/23/22  · The left ventricle is normal in size with concentric hypertrophy and moderately decreased systolic function.  · The estimated ejection fraction is 35-40%.  · Grade I left ventricular diastolic dysfunction.  · Mild right ventricular enlargement with normal right ventricular systolic function.  · Mild left atrial enlargement.  · Mild right atrial enlargement.  · Mild tricuspid regurgitation.           Past Medical History:   Diagnosis Date    Hypertension     Lung cancer     NSCLC    Lung mass     left lung       Past Surgical History:   Procedure Laterality Date    BRONCHOSCOPY N/A 8/30/2019    Procedure: Bronchoscopy;  Surgeon: Miguel Diagnostic Provider;  Location: [facility] OR [address] [place] [address];  Service: Anesthesiology;  Laterality: N/A;       Review of patient's allergies indicates:  No Known Allergies    No current facility-administered medications on file prior to encounter.     Current Outpatient Medications on File Prior to Encounter   Medication Sig    amLODIPine (NORVASC) 10 MG tablet Take 1 tablet (10 mg total) by mouth once daily.    ascorbic acid-multivit-min (VITAMIN C ENERGY BOOSTER) 1,000 mg PwEP Take by mouth. Patient takes 3,000 mg per day    atenoloL (TENORMIN) 50 MG tablet Take 1 tablet (50 mg total) by mouth once daily.    LIDOcaine-prilocaine (EMLA) cream Apply generously to port site 30-60 min prior to chemo and then cover with saran wrap.    ondansetron (ZOFRAN) 8 MG tablet  Take 1 tablet (8 mg total) by mouth 4 (four) times daily as needed for Nausea.     Family History       Problem Relation (Age of Onset)    Cancer Father, Sister    Diabetes Mother    Heart defect Mother    No Known Problems Son          Tobacco Use    Smoking status: Former Smoker     Packs/day: 1.00     Years: 25.00     Pack years: 25.00     Types: Cigarettes     Quit date:      Years since quittin.1    Smokeless tobacco: Never Used   Substance and Sexual Activity    Alcohol use: Yes     Comment: ocassionally    Drug use: Never    Sexual activity: Yes     Partners: Female     Review of Systems   Unable to perform ROS: Intubated   Objective:     Vital Signs (Most Recent):  Temp: 96.8 °F (36 °C) (22 0715)  Pulse: (!) 48 (22 1040)  Resp: 17 (22 1040)  BP: 105/60 (22 1030)  SpO2: 100 % (22 1040)   Vital Signs (24h Range):  Temp:  [96.8 °F (36 °C)-97.7 °F (36.5 °C)] 96.8 °F (36 °C)  Pulse:  [] 48  Resp:  [5-53] 17  SpO2:  [73 %-100 %] 100 %  BP: ()/() 105/60     Weight: 99.8 kg (220 lb 0.3 oz)  Body mass index is 31.57 kg/m².    SpO2: 100 %  O2 Device (Oxygen Therapy): ventilator      Intake/Output Summary (Last 24 hours) at 2022 1104  Last data filed at 2022 0600  Gross per 24 hour   Intake 4163.61 ml   Output 550 ml   Net 3613.61 ml       Lines/Drains/Airways       Peripherally Inserted Central Catheter Line  Duration             PICC Triple Lumen 22 right brachial <1 day              Central Venous Catheter Line  Duration             Port A Cath Single Lumen right subclavian -- days              Drain  Duration                  Urethral Catheter 22 1530 Straight-tip 16 Fr. <1 day              Airway  Duration                  Airway - Non-Surgical 22 1410 Endotracheal Tube <1 day              Peripheral Intravenous Line  Duration                  Peripheral IV - Single Lumen 22 1400 20 G Anterior;Distal;Left Upper Arm  <1 day         Peripheral IV - Single Lumen 02/22/22 1725 20 G Right Hand <1 day         Peripheral IV - Single Lumen 02/22/22 1737 22 G Right Upper Arm <1 day                    Physical Exam  Constitutional:       Appearance: He is well-developed.   HENT:      Head: Normocephalic and atraumatic.   Eyes:      Conjunctiva/sclera: Conjunctivae normal.      Pupils: Pupils are equal, round, and reactive to light.   Cardiovascular:      Rate and Rhythm: Normal rate.      Pulses: Intact distal pulses.      Heart sounds: Normal heart sounds.   Pulmonary:      Effort: Pulmonary effort is normal.      Breath sounds: Rales present.   Abdominal:      General: Bowel sounds are normal.      Palpations: Abdomen is soft.   Musculoskeletal:         General: Normal range of motion.      Cervical back: Normal range of motion and neck supple.   Skin:     General: Skin is warm and dry.       Significant Labs: All pertinent lab results from the last 24 hours have been reviewed.    Significant Imaging: Echocardiogram: 2D echo with color flow doppler: No results found for this or any previous visit.    Assessment and Plan:     Multifocal atrial tachycardia  New Dx. Related to underlying pulmonary issues. Now resolved    High anion gap metabolic acidosis  Being treated for possible DKA    NSTEMI (non-ST elevated myocardial infarction)  Peak troponin 1.7. Suspect demand ischemia from tachycardia, respiratory distress and acidosis. Echo with EF 35-40% ? Tachymyopathy. Will discuss ischemic evaluation when more stable    Lung cancer, main bronchus, left  Per primary        VTE Risk Mitigation (From admission, onward)         Ordered     enoxaparin injection 100 mg  Every 12 hours (non-standard times)         02/22/22 1742              35 minutes spent with patient in ICU    Thank you for your consult. I will follow-up with patient. Please contact us if you have any additional questions.    Robson Green MD  Cardiology   [facility] [facility]

## 2022-02-23 NOTE — EICU
EICU BRIEF INITIAL EVALUATION:       HISTORY:      61-year-old male presented with shortness of breath and altered mental status.  Found to be hypoxic and had to be intubated for airway protection  History of stage IV lung cancer on palliative chemotherapy, hypertension she    DVT prophylaxis-fully anticoagulated on enoxaparin  GI prophylaxis PPI    Camera  /68, P 56, RR 28, O2 sat 100  Sedated on ventilator 580/28/5/60%      CAMERA ASSESSMENT: Yes      TELEMETRY: Reviewed      NOTES: Reviewed     LABS: Reviewed      IMAGING: Personally reviewed.      DISCUSSED with bedside nurse     ASSESSMENT AND PLAN:     Acute hypoxic, hypercapnic respiratory failure-continue ventilator support, titrate FiO2 to O2 sat 91-93.    Shock most likely septic-suspicion of postobstructive pneumonia-did not received full sepsis bolus due to suspicion of CHF.  500 mL normal saline bolus, start Levophed, place PICC line.  Continue antibiotics, check cultures, follow lactate    Hyperglycemia with HAGMA-doubt DKA with glucose 248-most likely due to sepsis    Hypokalemia-replacement already ordered    Elevated troponin NSTEMI versus demand ischemia-D 8 PT, statin and anticoagulation already ordered    History of hypertension-currently required blood pressure support.  Will treat p.r.n. for SBP greater than 160    KAYLA Jimenez MD  eICU Attending  [phone]

## 2022-02-23 NOTE — PROGRESS NOTES
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 Patient is currently intubated and unable to provide history. His wife, who is at bedside, states he was in his normal state up until today. States he had been eating and drinking just fine but did have to hide his "coke" which he drinks in excess. Apparently had "coke" again this AM as he felt that would help his breathing. He is currently on palliative chemotherapy with Gemcitabine, last dose given 2/15/2022 (about a week ago). Is no longer smoking.     In the ED, patient had progressive respiratory distress. Became diaphoretic and confused. Was also pulling oxygen mask. Patient was therefore intubated. Labwork significant for lactic acidosis of 8.7, hyperglycemia, HAGMA, hypokalemia, mild hyponatremia, hyperphosphatemia, BNP 700s, hyaline casts in UA and troponin 0.9. No ST segment elevation or depression. Cardiology and pulmonology consulted. Given fluids, empiric abx and admitted to ICU.       Overview/Hospital Course:  Mr Iverson presented with acute respiratory failure with hypercapnia/hypoxia. Intubated in the ED. Started on broad spectrum antibiotics. Blood and resp cultures obtained. No pneumothorax, large consolidation or pulmonary embolism but with moderate right sided pleural effusion and known left main bronchial cancerous mass. Given fluids. Lactic acidosis resolved. Questionable diabetic ketoacidosis given hyperglycemia and high anion gap metabolic acidosis. Placed on " insulin infusion. Also started on full dose anticoagulation, double antiplatelet therapy and statin for suspected NSTEMI. Cardiology and pulmonology consulted.       Interval History: remains intubated. Deeply sedated. Glucose normal. Off insulin. Potassium low.     Review of Systems   Unable to perform ROS: Intubated   Objective:     Vital Signs (Most Recent):  Temp: 96.8 °F (36 °C) (02/23/22 0715)  Pulse: (!) 48 (02/23/22 1040)  Resp: 17 (02/23/22 1040)  BP: 105/60 (02/23/22 1030)  SpO2: 100 % (02/23/22 1040)   Vital Signs (24h Range):  Temp:  [96.8 °F (36 °C)-97.7 °F (36.5 °C)] 96.8 °F (36 °C)  Pulse:  [] 48  Resp:  [5-53] 17  SpO2:  [73 %-100 %] 100 %  BP: ()/() 105/60     Weight: 99.8 kg (220 lb 0.3 oz)  Body mass index is 31.57 kg/m².    Intake/Output Summary (Last 24 hours) at 2/23/2022 1052  Last data filed at 2/23/2022 0600  Gross per 24 hour   Intake 4163.61 ml   Output 550 ml   Net 3613.61 ml      Physical Exam  Vitals and nursing note reviewed.   Constitutional:       Appearance: He is ill-appearing.   Cardiovascular:      Rate and Rhythm: Regular rhythm. Bradycardia present.   Pulmonary:      Breath sounds: No wheezing or rales.      Comments: No ventilator support  Abdominal:      Palpations: Abdomen is soft.   Musculoskeletal:      Right lower leg: No edema.      Left lower leg: No edema.   Skin:     Capillary Refill: Capillary refill takes less than 2 seconds.   Neurological:      Comments: Sedated. Barely moves with verbal or tactile stimuli    Psychiatric:      Comments: Unable to assess        Significant Labs: All pertinent labs within the past 24 hours have been reviewed.    Significant Imaging: I have reviewed all pertinent imaging results/findings within the past 24 hours.  I have reviewed and interpreted all pertinent imaging results/findings within the past 24 hours.      Assessment/Plan:      * Acute respiratory failure with hypoxia and hypercapnia  Patient with  Hypercapnic and Hypoxic Respiratory failure which is Acute.  he is not on home oxygen. Supplemental oxygen was provided and noted- Vent Mode: PRVC  Oxygen Concentration (%):  [] 50  Resp Rate Total:  [24 br/min] 24 br/min  Vt Set:  [550 mL-580 mL] 580 mL  PEEP/CPAP:  [5 cmH20-8 cmH20] 5 cmH20  Mean Airway Pressure:  [10.1 cmH20-15.1 cmH20] 10.1 cmH20.   Signs/symptoms of respiratory failure include- respiratory distress. Contributing diagnoses includes - I suspect cardiogenic pulmonary edema. I also suspect tachypnea to compensate for acidemia Labs and images were reviewed. Patient Has recent ABG, which has been reviewed. Will treat underlying causes and adjust management of respiratory failure as follows  Pulmonology on board for ventilator management. I agree with empiric broad spectrum antibiotics pending infectious workup. Cardiology consulted and echocardiogram ordered for suspected NSTEMI. Cotninue stress ulcer prophylaxis. SAT/SBT as per pulmonology    High anion gap metabolic acidosis  Lactic acidosis and gap resolved. Metabolic acidosis now mild  DKA on presentation ???      NSTEMI (non-ST elevated myocardial infarction)  Loading doses of aspirin and clopidogrel given. Statin and full dose anticoagulation  Echocardiogram ordered. Cardiology consulted  BB and ACE-I when able to tolerate      Hyperglycemia  I suspect new diagnosis of diabetes. HgbA1c pending  Glucose stable without insulin requirements at this time      Lactic acidosis  Resolved with fluids      ACP (advance care planning)  Advance Care Planning     Date: 02/22/2022    [facility]  I engaged the wife in a conversation about advance care planning and we specifically addressed what the goals of care would be moving forward, in light of the patient's change in clinical status.  We did specifically address the patient's likely prognosis, which is fair .  We explored the patient's values and preferences for future care.  The wife endorses that what  is most important right now is to focus on curative/life-prolongation (regardless of treatment burdens)    Accordingly, we have decided that the best plan to meet the patient's goals includes continuing with treatment    I did not explain the role for hospice care at this stage of the patient's illness, including its ability to help the patient live with the best quality of life possible.  We will not be making a hospice referral.    I spent a total of > 15 minutes engaging the patient in this advance care planning discussion.    Dr Tran states she had a code status conversation with patient prior to intubation. Patient prefers aggressive care and full code. Wife confirms that these are his wishes.              Hyperphosphatemia  PTH high. Hyperphosphatemia resolved. iCa pending      Hypokalemia  Replaced. Repeat CMP at noon      Primary hypertension  BP not high. Hold off on antihypertensives      Immunodeficiency due to drugs  Noted. Is not leukopenic or neutropenic      Secondary malignant neoplasm of bone  Noted       Lung cancer, main bronchus, left  Noted. Is stage 4. On palliative chemotherapy. Followed by Dr Hathaway as outpatient      History of completed stroke  Noted       Macrocytic anemia  No indication for transfusion at this time    Former smoker  Per patient's wife, patient has been abstinent for 3 years        VTE Risk Mitigation (From admission, onward)         Ordered     enoxaparin injection 100 mg  Every 12 hours (non-standard times)         02/22/22 1744                Discharge Planning   AZ:      Code Status: Full Code   Is the patient medically ready for discharge?:     Reason for patient still in hospital (select all that apply): Treatment               Critical care time spent on the evaluation and treatment of severe organ dysfunction, review of pertinent labs and imaging studies, discussions with consulting providers and discussions with patient/family: > 35 minutes.      Abbey Disla,  MD  Department of Hospital Medicine   [facility] [place] - Intensive Care

## 2022-02-23 NOTE — SUBJECTIVE & OBJECTIVE
Past Medical History:   Diagnosis Date    Hypertension     Lung cancer     NSCLC    Lung mass     left lung       Past Surgical History:   Procedure Laterality Date    BRONCHOSCOPY N/A 2019    Procedure: Bronchoscopy;  Surgeon: Miguel Diagnostic Provider;  Location: [facility] OR [address] [place] [address];  Service: Anesthesiology;  Laterality: N/A;       Review of patient's allergies indicates:  No Known Allergies    Family History       Problem Relation (Age of Onset)    Cancer Father, Sister    Diabetes Mother    Heart defect Mother    No Known Problems Son          Tobacco Use    Smoking status: Former Smoker     Packs/day: 1.00     Years: 25.00     Pack years: 25.00     Types: Cigarettes     Quit date:      Years since quittin.1    Smokeless tobacco: Never Used   Substance and Sexual Activity    Alcohol use: Yes     Comment: ocassionally    Drug use: Never    Sexual activity: Yes     Partners: Female         Review of Systems   Unable to perform ROS: Intubated   Objective:     Vital Signs (Most Recent):  Temp: 97.7 °F (36.5 °C) (22 0445)  Pulse: (!) 51 (22 0845)  Resp: (!) 24 (2245)  BP: (!) 91/59 (22 0845)  SpO2: 100 % (22 0845)   Vital Signs (24h Range):  Temp:  [97.4 °F (36.3 °C)-97.7 °F (36.5 °C)] 97.7 °F (36.5 °C)  Pulse:  [] 51  Resp:  [5-43] 24  SpO2:  [73 %-100 %] 100 %  BP: ()/() 91/59     Weight: 99.8 kg (220 lb 0.3 oz)  Body mass index is 31.57 kg/m².      Intake/Output Summary (Last 24 hours) at 2022 0850  Last data filed at 2022 0600  Gross per 24 hour   Intake 4163.61 ml   Output 550 ml   Net 3613.61 ml       Physical Exam  Constitutional:       Appearance: He is well-developed.   HENT:      Head: Normocephalic and atraumatic.   Eyes:      Conjunctiva/sclera: Conjunctivae normal.      Pupils: Pupils are equal, round, and reactive to light.   Cardiovascular:      Rate and Rhythm: Normal rate and regular rhythm.      Heart sounds: Normal heart sounds.    Pulmonary:      Effort: Pulmonary effort is normal.      Breath sounds: Normal breath sounds.   Abdominal:      General: Bowel sounds are normal.      Palpations: Abdomen is soft.   Musculoskeletal:         General: Normal range of motion.      Cervical back: Normal range of motion and neck supple.   Skin:     General: Skin is warm.   Neurological:      Cranial Nerves: No cranial nerve deficit.      Comments: Sedated on vent.       Vents:  Vent Mode: PRVC (02/23/22 0406)  Ventilator Initiated: Yes (02/22/22 1430)  Set Rate: 28 BPM (02/23/22 0406)  Vt Set: 580 mL (02/23/22 0406)  PEEP/CPAP: 5 cmH20 (02/23/22 0406)  Oxygen Concentration (%): 50 (02/23/22 0845)  Peak Airway Pressure: 22.7 cmH2O (02/23/22 0406)  Total Ve: 15.73 mL (02/23/22 0406)  F/VT Ratio<105 (RSBI): (!) 49.63 (02/23/22 0406)    Lines/Drains/Airways       Peripherally Inserted Central Catheter Line  Duration             PICC Triple Lumen 02/22/22 2020 right brachial <1 day              Central Venous Catheter Line  Duration             Port A Cath Single Lumen right subclavian -- days              Drain  Duration                  Urethral Catheter 02/22/22 1530 Straight-tip 16 Fr. <1 day              Airway  Duration                  Airway - Non-Surgical 02/22/22 1410 Endotracheal Tube <1 day              Peripheral Intravenous Line  Duration                  Peripheral IV - Single Lumen 02/22/22 1400 20 G Anterior;Distal;Left Upper Arm <1 day         Peripheral IV - Single Lumen 02/22/22 1725 20 G Right Hand <1 day         Peripheral IV - Single Lumen 02/22/22 1737 22 G Right Upper Arm <1 day                    Significant Labs:    CBC/Anemia Profile:  Recent Labs   Lab 02/22/22  1452 02/23/22  0407   WBC 8.78 8.01   HGB 11.5* 9.3*   HCT 35.8* 27.2*    168   * 96   RDW 16.2* 16.4*        Chemistries:  Recent Labs   Lab 02/22/22  1452 02/22/22  1910 02/23/22  0129 02/23/22  0407   * 135* 133* 133*   K 3.0* 3.5 2.5* 2.6*   CL  100 102 101 102   CO2 17* 19* 21* 20*   BUN 8 8 9 9   CREATININE 1.0 0.9 0.9 1.0   CALCIUM 8.4* 7.8* 7.8* 7.5*   ALBUMIN 2.9*  --   --  2.4*   PROT 6.8  --   --  5.5*   BILITOT 0.7  --   --  0.7   ALKPHOS 76  --   --  57   ALT 20  --   --  19   AST 34  --   --  29   MG 1.8  --   --  1.6   PHOS 6.4*  --   --  3.7       ABGs:   Recent Labs   Lab 02/22/22  1632   PH 7.419   PCO2 34.3*   HCO3 22.2*   POCSATURATED 92*   BE -2     Cardiac Markers: No results for input(s): CKMB, TROPONINT, MYOGLOBIN in the last 48 hours.  Lactic Acid:   Recent Labs   Lab 02/22/22  1454 02/22/22  1910   LACTATE 8.7* 2.6*     Respiratory Culture: No results for input(s): GSRESP, RESPIRATORYC in the last 48 hours.  Troponin:   Recent Labs   Lab 02/22/22  1452 02/22/22  2222 02/23/22  0407   TROPONINI 0.917* 1.759* 1.018*     Urine Culture: No results for input(s): LABURIN in the last 48 hours.    Echo 2/23/22  The left ventricle is normal in size with concentric hypertrophy and moderately decreased systolic function.  The estimated ejection fraction is 35-40%.  Grade I left ventricular diastolic dysfunction.  Mild right ventricular enlargement with normal right ventricular systolic function.  Mild left atrial enlargement.  Mild right atrial enlargement.  Mild tricuspid regurgitation.      Significant Imaging:   I have reviewed all pertinent imaging results/findings within the past 24 hours.  CT: I have reviewed all pertinent results/findings within the past 24 hours and my personal findings are:  2/22/22 no central filling defect.  Moderate right effusion.  Atelectasis in georges and lll.  Improved aeration when compared to 8/30/19 chest ct  CXR: I have reviewed all pertinent results/findings within the past 24 hours and my personal findings are:  2/22/22 blunting of left costophrenic angle.  Stranding opacification georges.  Much improved when compared to 8/22/19 cxr

## 2022-02-23 NOTE — SUBJECTIVE & OBJECTIVE
Interval History: remains intubated. Deeply sedated. Glucose normal. Off insulin. Potassium low.     Review of Systems   Unable to perform ROS: Intubated   Objective:     Vital Signs (Most Recent):  Temp: 96.8 °F (36 °C) (02/23/22 0715)  Pulse: (!) 48 (02/23/22 1040)  Resp: 17 (02/23/22 1040)  BP: 105/60 (02/23/22 1030)  SpO2: 100 % (02/23/22 1040)   Vital Signs (24h Range):  Temp:  [96.8 °F (36 °C)-97.7 °F (36.5 °C)] 96.8 °F (36 °C)  Pulse:  [] 48  Resp:  [5-53] 17  SpO2:  [73 %-100 %] 100 %  BP: ()/() 105/60     Weight: 99.8 kg (220 lb 0.3 oz)  Body mass index is 31.57 kg/m².    Intake/Output Summary (Last 24 hours) at 2/23/2022 1052  Last data filed at 2/23/2022 0600  Gross per 24 hour   Intake 4163.61 ml   Output 550 ml   Net 3613.61 ml      Physical Exam  Vitals and nursing note reviewed.   Constitutional:       Appearance: He is ill-appearing.   Cardiovascular:      Rate and Rhythm: Regular rhythm. Bradycardia present.   Pulmonary:      Breath sounds: No wheezing or rales.      Comments: No ventilator support  Abdominal:      Palpations: Abdomen is soft.   Musculoskeletal:      Right lower leg: No edema.      Left lower leg: No edema.   Skin:     Capillary Refill: Capillary refill takes less than 2 seconds.   Neurological:      Comments: Sedated. Barely moves with verbal or tactile stimuli    Psychiatric:      Comments: Unable to assess        Significant Labs: All pertinent labs within the past 24 hours have been reviewed.    Significant Imaging: I have reviewed all pertinent imaging results/findings within the past 24 hours.  I have reviewed and interpreted all pertinent imaging results/findings within the past 24 hours.

## 2022-02-23 NOTE — HOSPITAL COURSE
Mr Iverson presented with acute respiratory failure with hypercapnia/hypoxia. Intubated in the ED. Started on broad spectrum antibiotics. Blood and resp cultures obtained. No pneumothorax, large consolidation or pulmonary embolism but with moderate right sided pleural effusion and known left main bronchial cancerous mass. Given fluids. Lactic acidosis resolved. Questionable diabetic ketoacidosis given hyperglycemia and high anion gap metabolic acidosis. Placed on insulin infusion. Also started on full dose anticoagulation, double antiplatelet therapy and statin for suspected NSTEMI. Glucose normalized and HAGMA resolved. Insulin discontinued. Fluids stopped. HgbA1c < 6% which is not consistent with diabetes. Cardiology and pulmonology consulted. Echocardiogram showed LVEF 35%-40% with grade 1 diastolic dysfunction. Furosemide IV given. Extubated to NC on 2/23. Overnight patient became hyperactive and encephalopathic. Experienced respiratory failure. Re-intubated. Diuresed. Extubated on 2/25 with precedex in place to reduce anxiety/paranoia post extubation. This worked well and remained stable on low flow NC. Plan for LHC. PT/OT consulted for dispo. He was transferred to the floor. Family having long discussion about having heart cath, discussed with Oncologist and agreed to move forward. He had intermittent delirium, MRI Brain did not show any new lesions or strokes. This had resolved prior to discharge. LHC noted non-obstructive CAD. No intervention. Patient had progressed and required no therapy at time of discharge. Discussed plan with wife and patient. He was discharged home in stable condition to follow up with Oncology, PCP and Cardiology.

## 2022-02-23 NOTE — PLAN OF CARE
Pt remains in ICU on ventilator. Propofol and fentanyl for sedation, IVF infusing, levophed for MAP >65. VSS. Sinus rosemary on the monitor. Pt responds to pain and will follow commands sometimes. Potassium replaced. Insulin drip turned off per orders. Lemon in place, with adequate UO. No falls, injuries or skin breakdown this shift. Spouse updated on plan of care.

## 2022-02-23 NOTE — PLAN OF CARE
Recommendations  1) Replace electrolytes    2) When medically able, initate tube feeds of diabetasource at 20 mL/hr and gradually advance to goal rate of 55 mL/hr  -  mL Q 4 hr  - Beneprotein Packets: 2 packets TID   - Formula + Beneprotein + propofol will provide 2064 kcal, 115 g protein, 1708 kcal   - Will meet 120% kcal, 77% protein, and 100% fluid needs  - Hold for residuals > 400 cc    3) RD to complete nutrition education if appropriate at follow up    Goals: Nutrition initiated by RD follow up  Communication of RD Recs: reviewed with physician    Assessment and Plan  Nutrition Problem  Inadequate oral intake    Related to (etiology):   NPO status, intubated/sedated    Signs and Symptoms (as evidenced by):   Pt receiving < 25% EEN/EPN    Interventions(treatment strategy):  Collaboration with other providers  Nutrition Education    Nutrition Diagnosis Status:   New

## 2022-02-23 NOTE — AI DETERIORATION ALERT
Sepsis Screen (most recent)     Sepsis Screen - 02/23/22 1018     Is the patient's history or complaint suggestive of a possible infection? Yes  -    Are there at least two of the following signs and symptoms present? Yes  -    Sepsis signs/symptoms - Tachycardia Tachycardia     >90  -    Sepsis signs/symptoms - Tachypnea Tachypnea     >20  -    Are any of the following organ dysfunction criteria present and not considered to be due to a chronic condition? Yes  -    Organ Dysfunction Criteria Lactate > 2.0  -    Organ Dysfunction Criteria Respirator compromise requiring Bipap, Cpap, or Intubation  -    Start Sepsis Timer No   on abx -          User Key  (r) = Recorded By, (t) = Taken By, (c) = Cosigned By    Initials Name    JW Rhina Quispe RN              Patient screens positive but is on ABX therapy, therefore timer not started. WCTM.

## 2022-02-23 NOTE — CONSULTS
[facility] - Intensive Care  Adult Nutrition  Consult Note    SUMMARY     Recommendations  1) Replace electrolytes    2) When medically able, initate tube feeds of diabetasource at 20 mL/hr and gradually advance to goal rate of 55 mL/hr  -  mL Q 4 hr  - Beneprotein Packets: 2 packets TID   - Formula + Beneprotein + propofol will provide 2064 kcal, 115 g protein, 1708 kcal   - Will meet 120% kcal, 77% protein, and 100% fluid needs  - Hold for residuals > 400 cc    3) RD to complete nutrition education if appropriate at follow up    Goals: Nutrition initiated by RD follow up  Communication of RD Recs: reviewed with physician    Assessment and Plan  Nutrition Problem  Inadequate oral intake    Related to (etiology):   NPO status, intubated/sedated    Signs and Symptoms (as evidenced by):   Pt receiving < 25% EEN/EPN    Interventions(treatment strategy):  Collaboration with other providers  Nutrition Education    Nutrition Diagnosis Status:   New    Malnutrition Assessment    ALPA NFPEdue to remote assessment  +2 Edema on admit (LEs)    Reason for Assessment  Reason For Assessment: consult, new tube feeding  Diagnosis: other (see comments) (acute respiratory failure with hypoxia and hypercapnia\)  Relevant Medical History: stage 4 squamous cell cancer of lung of bronchus, former smoker and hypertension  Interdisciplinary Rounds: did not attend  General Information Comments: Remote assessment for coverage, consult for education and tube feed recs. Pt intubated/sedated, concern for DKA at admit, with hypokalemia, hyponatremia and hyperglycemia. BG improving. Nutrition education attached to discharge documents. Per chart, UBW ~230-240 lbs x 1 year showing about 4% wt loss x 3 months, not significant. Unable to complete NFPE due to remote assessment.  Nutrition Discharge Planning: Pending clinical course    Nutrition Risk Screen  Nutrition Risk Screen: no indicators present    Nutrition/Diet History  Typical  "Food/Fluid Intake: Per MD note, pt drinks a lot of "coke" at home  Factors Affecting Nutritional Intake: NPO, on mechanical ventilation    Anthropometrics  Temp: 96.8 °F (36 °C)  Height Method: Measured  Height: 5' 10" (177.8 cm)  Height (inches): 70 in  Weight Method: Bed Scale  Weight: 99.8 kg (220 lb 0.3 oz)  Weight (lb): 220.02 lb  Ideal Body Weight (IBW), Male: 166 lb  % Ideal Body Weight, Male (lb): 140.96 %  BMI (Calculated): 31.6     Lab/Procedures/Meds  Pertinent Labs Reviewed: reviewed  Pertinent Labs Comments: Na 133, K 2.6, Glu 178, Ca 7.5, Alb 2.4, Hgb A1c 5 on 2/23  Pertinent Medications Reviewed: reviewed  Pertinent Medications Comments: atorvastatin, famotidine, furosemide, NaCl    Estimated/Assessed Needs  Weight Used For Calorie Calculations: 99.8 kg (220 lb)  Energy Calorie Requirements (kcal): 1708 kcal/day  Energy Need Method: [place] (modified)  Protein Requirements: >150 g/day based on  Weight Used For Protein Calculations: 75.3 kg (166 lb) (IBW)  Fluid Requirements (mL): 1 mL/kcal or per MD  Estimated Fluid Requirement Method: RDA Method  RDA Method (mL): 1708  CHO Requirement: 214 g/day based on 50% kcal from CHO    Nutrition Prescription Ordered  Current Diet Order: NPO    Evaluation of Received Nutrient/Fluid Intake  Energy Calories Required: not meeting needs  Protein Required: not meeting needs  Fluid Required: not meeting needs  Comments: LBM 2/21  Tolerance: other (see comments) (NPO)  % Intake of Estimated Energy Needs: 0 - 25 %  % Meal Intake: NPO    Nutrition Risk  Level of Risk/Frequency of Follow-up:  (1-2x weekly)     Monitor and Evaluation  Food and Nutrient Intake: food and beverage intake, enteral nutrition intake  Food and Nutrient Adminstration: diet order  Knowledge/Beliefs/Attitudes: food and nutrition knowledge/skill  Physical Activity and Function: nutrition-related ADLs and IADLs  Anthropometric Measurements: weight change  Biochemical Data, Medical Tests and " Procedures: gastrointestinal profile, electrolyte and renal panel, glucose/endocrine profile, inflammatory profile  Nutrition-Focused Physical Findings: overall appearance, skin, extremities, muscles and bones     Nutrition Follow-Up  RD Follow-up?: Yes

## 2022-02-23 NOTE — PROGRESS NOTES
Vancomycin consult follow-up:    Patient reviewed, renal function stable, no new levels, continue current therapy; Next levels due: trough due 2/24/2022 at 0200

## 2022-02-23 NOTE — EICU
Rounding (Video Assessment):  No    Intervention Initiated From:  Bedside    Mignon Communicated with Bedside Nurse regarding:  BP and Medication    Nurse Notified:  No    Doctor Notified:  Yes    Comments: elert from bedside nurse for pt with t b/p-54/31. Needs another pressor. Informed Dr. Jimenez  That nurse needs him to camera into room.he is going into room now.

## 2022-02-23 NOTE — CARE UPDATE
[facility] - [place], [place] Intensive Care  ICU Shift Summary  Date: 2/23/2022      Prehospitalization: Home  Admit Date / LOS : 2/22/2022/ 1 days    Diagnosis: Acute respiratory failure with hypoxia and hypercapnia    Consults:        Active: Cardio and Pulm CC       Needed: N/A     Code Status: Full Code   Advanced Directive: <no information>    LDA: Lemon, OG, PICC, Port, PIV and Vent       Central Lines/Site/Justification:Pressors and Multiple GTTS       Urinary Cath/Order/Justification:Critically Ill in the ICU and requiring intensive monitoring    Vasopressors/Infusions:    0/9% NACL & POTASSIUM CHLORIDE 20 MEQ/L 100 mL/hr (02/23/22 0417)    fentanyl 112.5 mcg/hr (02/23/22 0400)    NORepinephrine bitartrate-D5W 0.04 mcg/kg/min (02/23/22 0400)    propofoL 10 mcg/kg/min (02/23/22 0400)          GOALS: Volume/ Hemodynamic: MAP >                     RASS: -2  light sedation, briefly awakes to voice (eye opening)    Pain Management: IV       Pain Controlled: yes     Rhythm: A-Fib and SB    Respiratory Device: Vent    Vent Mode: PRVC  Oxygen Concentration (%):  [] 60  Vt Set:  [550 mL-580 mL] 580 mL  PEEP/CPAP:  [5 cmH20-8 cmH20] 5 cmH20  Mean Airway Pressure:  [11.7 cmH20-15.1 cmH20] 11.9 cmH20             Most Recent SBT/ SAT: N/A       MOVE Screen: FAIL  ICU Liberation: yes    VTE Prophylaxis: Pharm  Mobility: Bedrest  Stress Ulcer Prophylaxis: Yes    Isolation: No active isolations  Dietary: NPO  Tolerance: not applicable  Advancement: no    I & O (24h):    Intake/Output Summary (Last 24 hours) at 2/23/2022 0443  Last data filed at 2/23/2022 0400  Gross per 24 hour   Intake 3743.41 ml   Output --   Net 3743.41 ml        Restraints: Yes    Significant Dates:  Post Op Date: N/A  Rescue Date: N/A  Imaging/ Diagnostics: N/A    Noteworthy Labs:  See below    COVID Test: (--)  CBC/Anemia Labs: Coags:    Recent Labs   Lab 02/22/22  1452 02/23/22  0407   WBC 8.78 8.01   HGB 11.5* 9.3*   HCT 35.8* 27.2*    168   MCV  102* 96   RDW 16.2* 16.4*    Recent Labs   Lab 02/22/22  1452   INR 1.2   APTT 25.1        Chemistries:   Recent Labs   Lab 02/22/22  1452 02/22/22  1910 02/23/22  0129   * 135* 133*   K 3.0* 3.5 2.5*    102 101   CO2 17* 19* 21*   BUN 8 8 9   CREATININE 1.0 0.9 0.9   CALCIUM 8.4* 7.8* 7.8*   PROT 6.8  --   --    BILITOT 0.7  --   --    ALKPHOS 76  --   --    ALT 20  --   --    AST 34  --   --    MG 1.8  --   --    PHOS 6.4*  --   --         Cardiac Enzymes: Ejection Fractions:    Recent Labs     02/22/22  1452 02/22/22  2222   TROPONINI 0.917* 1.759*    No results found for: EF     POCT Glucose: HbA1c:    Recent Labs   Lab 02/22/22  2324 02/23/22  0004 02/23/22  0132   POCTGLUCOSE 187* 192* 181*    Hemoglobin A1C   Date Value Ref Range Status   08/17/2021 6.0 (H) 4.0 - 5.6 % Final     Comment:     ADA Screening Guidelines:  5.7-6.4%  Consistent with prediabetes  >or=6.5%  Consistent with diabetes    High levels of fetal hemoglobin interfere with the HbA1C  assay. Heterozygous hemoglobin variants (HbS, HgC, etc)do  not significantly interfere with this assay.   However, presence of multiple variants may affect accuracy.             ICU LOS 10h  Level of Care: Critical Care    Chart Check: 12 HR Done  Shift Summary/Plan for the shift: see care plan note

## 2022-02-23 NOTE — CARE UPDATE
RT RECEIVED PATIENT ON VENT SETTINGS AS CHARTED IN FLOWSHEET CHARTING. ALL ALARMS ARE CHECKED AND FUNCTIONING. PT IN NO NOTED DISTRESS. RT WILL CONTINUE TO MONITOR PATIENT AT THIS TIME.

## 2022-02-23 NOTE — ASSESSMENT & PLAN NOTE
I suspect new diagnosis of diabetes. HgbA1c pending  Glucose stable without insulin requirements at this time

## 2022-02-23 NOTE — ASSESSMENT & PLAN NOTE
Patient with Hypercapnic and Hypoxic Respiratory failure which is Acute.  he is not on home oxygen. Supplemental oxygen was provided and noted- Vent Mode: PRVC  Oxygen Concentration (%):  [] 50  Resp Rate Total:  [24 br/min] 24 br/min  Vt Set:  [550 mL-580 mL] 580 mL  PEEP/CPAP:  [5 cmH20-8 cmH20] 5 cmH20  Mean Airway Pressure:  [10.1 cmH20-15.1 cmH20] 10.1 cmH20.   Signs/symptoms of respiratory failure include- respiratory distress. Contributing diagnoses includes - I suspect cardiogenic pulmonary edema. I also suspect tachypnea to compensate for acidemia Labs and images were reviewed. Patient Has recent ABG, which has been reviewed. Will treat underlying causes and adjust management of respiratory failure as follows  Pulmonology on board for ventilator management. I agree with empiric broad spectrum antibiotics pending infectious workup. Cardiology consulted and echocardiogram ordered for suspected NSTEMI. Cotninue stress ulcer prophylaxis. SAT/SBT as per pulmonology

## 2022-02-23 NOTE — EICU
eICU Physician Virtual/Remote Brief Evaluation Note      Message from RN  Her PICC nurse-a catheter which is in the right atrium is a pre-existing Port-A-Cath  X-ray rereviewed, discussed with RN  Left-sided PICC with tip in region of subclavian vein   Levophed infusing via Port-A-Cath  PICC may be used for nonvesicant medication and blood draws  May not be used for TPN, pressors or other irritating medications      KAYLA Jimenez MD  [facility] Attending  [phone]    This report has been created through the use of [employer] dictation software. Typographical and content errors may occur with this process. While efforts are made to detect and correct such errors, in some cases errors will persist. For this reason, wording in this document should be considered in the proper context and not strictly verbatim

## 2022-02-24 ENCOUNTER — PATIENT MESSAGE (OUTPATIENT)
Dept: FAMILY MEDICINE | Facility: CLINIC | Age: 62
End: 2022-02-24
Payer: MEDICARE

## 2022-02-24 ENCOUNTER — PATIENT MESSAGE (OUTPATIENT)
Dept: HEMATOLOGY/ONCOLOGY | Facility: CLINIC | Age: 62
End: 2022-02-24
Payer: MEDICARE

## 2022-02-24 PROBLEM — R57.0 CARDIOGENIC SHOCK: Status: ACTIVE | Noted: 2022-02-24

## 2022-02-24 LAB
ALBUMIN SERPL BCP-MCNC: 2.3 G/DL (ref 3.5–5.2)
ALP SERPL-CCNC: 60 U/L (ref 55–135)
ALT SERPL W/O P-5'-P-CCNC: 16 U/L (ref 10–44)
ANION GAP SERPL CALC-SCNC: 8 MMOL/L (ref 8–16)
ANION GAP SERPL CALC-SCNC: 8 MMOL/L (ref 8–16)
AST SERPL-CCNC: 26 U/L (ref 10–40)
BASOPHILS # BLD AUTO: 0.06 K/UL (ref 0–0.2)
BASOPHILS NFR BLD: 0.5 % (ref 0–1.9)
BILIRUB SERPL-MCNC: 0.5 MG/DL (ref 0.1–1)
BNP SERPL-MCNC: 1471 PG/ML (ref 0–99)
BUN SERPL-MCNC: 8 MG/DL (ref 8–23)
BUN SERPL-MCNC: 8 MG/DL (ref 8–23)
CALCIUM SERPL-MCNC: 7.2 MG/DL (ref 8.7–10.5)
CALCIUM SERPL-MCNC: 8.2 MG/DL (ref 8.7–10.5)
CHLORIDE SERPL-SCNC: 106 MMOL/L (ref 95–110)
CHLORIDE SERPL-SCNC: 109 MMOL/L (ref 95–110)
CO2 SERPL-SCNC: 21 MMOL/L (ref 23–29)
CO2 SERPL-SCNC: 25 MMOL/L (ref 23–29)
CREAT SERPL-MCNC: 0.9 MG/DL (ref 0.5–1.4)
CREAT SERPL-MCNC: 1.1 MG/DL (ref 0.5–1.4)
DIFFERENTIAL METHOD: ABNORMAL
EOSINOPHIL # BLD AUTO: 0 K/UL (ref 0–0.5)
EOSINOPHIL NFR BLD: 0.1 % (ref 0–8)
ERYTHROCYTE [DISTWIDTH] IN BLOOD BY AUTOMATED COUNT: 16.6 % (ref 11.5–14.5)
EST. GFR  (AFRICAN AMERICAN): >60 ML/MIN/1.73 M^2
EST. GFR  (AFRICAN AMERICAN): >60 ML/MIN/1.73 M^2
EST. GFR  (NON AFRICAN AMERICAN): >60 ML/MIN/1.73 M^2
EST. GFR  (NON AFRICAN AMERICAN): >60 ML/MIN/1.73 M^2
GLUCOSE SERPL-MCNC: 126 MG/DL (ref 70–110)
GLUCOSE SERPL-MCNC: 160 MG/DL (ref 70–110)
HCT VFR BLD AUTO: 28.2 % (ref 40–54)
HGB BLD-MCNC: 9.2 G/DL (ref 14–18)
IMM GRANULOCYTES # BLD AUTO: 0.1 K/UL (ref 0–0.04)
IMM GRANULOCYTES NFR BLD AUTO: 0.9 % (ref 0–0.5)
LYMPHOCYTES # BLD AUTO: 1.2 K/UL (ref 1–4.8)
LYMPHOCYTES NFR BLD: 11 % (ref 18–48)
MCH RBC QN AUTO: 32.3 PG (ref 27–31)
MCHC RBC AUTO-ENTMCNC: 32.6 G/DL (ref 32–36)
MCV RBC AUTO: 99 FL (ref 82–98)
MONOCYTES # BLD AUTO: 1.8 K/UL (ref 0.3–1)
MONOCYTES NFR BLD: 16.3 % (ref 4–15)
NEUTROPHILS # BLD AUTO: 7.8 K/UL (ref 1.8–7.7)
NEUTROPHILS NFR BLD: 71.2 % (ref 38–73)
NRBC BLD-RTO: 0 /100 WBC
PHOSPHATE SERPL-MCNC: 4.2 MG/DL (ref 2.7–4.5)
PLATELET # BLD AUTO: 153 K/UL (ref 150–450)
PMV BLD AUTO: 9.7 FL (ref 9.2–12.9)
POCT GLUCOSE: 129 MG/DL (ref 70–110)
POTASSIUM SERPL-SCNC: 3.2 MMOL/L (ref 3.5–5.1)
POTASSIUM SERPL-SCNC: 3.6 MMOL/L (ref 3.5–5.1)
PROT SERPL-MCNC: 5.3 G/DL (ref 6–8.4)
RBC # BLD AUTO: 2.85 M/UL (ref 4.6–6.2)
SODIUM SERPL-SCNC: 138 MMOL/L (ref 136–145)
SODIUM SERPL-SCNC: 139 MMOL/L (ref 136–145)
TROPONIN I SERPL DL<=0.01 NG/ML-MCNC: 0.61 NG/ML (ref 0–0.03)
VANCOMYCIN TROUGH SERPL-MCNC: 18.1 UG/ML (ref 10–22)
WBC # BLD AUTO: 11.01 K/UL (ref 3.9–12.7)

## 2022-02-24 PROCEDURE — 20000000 HC ICU ROOM

## 2022-02-24 PROCEDURE — 99900026 HC AIRWAY MAINTENANCE (STAT)

## 2022-02-24 PROCEDURE — 99233 SBSQ HOSP IP/OBS HIGH 50: CPT | Mod: ,,, | Performed by: INTERNAL MEDICINE

## 2022-02-24 PROCEDURE — 83880 ASSAY OF NATRIURETIC PEPTIDE: CPT | Performed by: INTERNAL MEDICINE

## 2022-02-24 PROCEDURE — 99291 PR CRITICAL CARE, E/M 30-74 MINUTES: ICD-10-PCS | Mod: ,,, | Performed by: INTERNAL MEDICINE

## 2022-02-24 PROCEDURE — 94761 N-INVAS EAR/PLS OXIMETRY MLT: CPT

## 2022-02-24 PROCEDURE — 84100 ASSAY OF PHOSPHORUS: CPT | Performed by: INTERNAL MEDICINE

## 2022-02-24 PROCEDURE — 94640 AIRWAY INHALATION TREATMENT: CPT

## 2022-02-24 PROCEDURE — 99291 CRITICAL CARE FIRST HOUR: CPT | Mod: ,,, | Performed by: INTERNAL MEDICINE

## 2022-02-24 PROCEDURE — A4216 STERILE WATER/SALINE, 10 ML: HCPCS | Performed by: INTERNAL MEDICINE

## 2022-02-24 PROCEDURE — 63600175 PHARM REV CODE 636 W HCPCS

## 2022-02-24 PROCEDURE — 84484 ASSAY OF TROPONIN QUANT: CPT | Performed by: INTERNAL MEDICINE

## 2022-02-24 PROCEDURE — 99900035 HC TECH TIME PER 15 MIN (STAT)

## 2022-02-24 PROCEDURE — 80202 ASSAY OF VANCOMYCIN: CPT | Performed by: INTERNAL MEDICINE

## 2022-02-24 PROCEDURE — 63600175 PHARM REV CODE 636 W HCPCS: Performed by: SURGERY

## 2022-02-24 PROCEDURE — 25000242 PHARM REV CODE 250 ALT 637 W/ HCPCS: Performed by: INTERNAL MEDICINE

## 2022-02-24 PROCEDURE — 25000003 PHARM REV CODE 250: Performed by: STUDENT IN AN ORGANIZED HEALTH CARE EDUCATION/TRAINING PROGRAM

## 2022-02-24 PROCEDURE — 94003 VENT MGMT INPAT SUBQ DAY: CPT

## 2022-02-24 PROCEDURE — 25000003 PHARM REV CODE 250: Performed by: INTERNAL MEDICINE

## 2022-02-24 PROCEDURE — 99233 PR SUBSEQUENT HOSPITAL CARE,LEVL III: ICD-10-PCS | Mod: ,,, | Performed by: INTERNAL MEDICINE

## 2022-02-24 PROCEDURE — 63600175 PHARM REV CODE 636 W HCPCS: Performed by: STUDENT IN AN ORGANIZED HEALTH CARE EDUCATION/TRAINING PROGRAM

## 2022-02-24 PROCEDURE — 63600175 PHARM REV CODE 636 W HCPCS: Performed by: INTERNAL MEDICINE

## 2022-02-24 PROCEDURE — 80048 BASIC METABOLIC PNL TOTAL CA: CPT | Performed by: INTERNAL MEDICINE

## 2022-02-24 PROCEDURE — 80053 COMPREHEN METABOLIC PANEL: CPT | Performed by: INTERNAL MEDICINE

## 2022-02-24 PROCEDURE — 85025 COMPLETE CBC W/AUTO DIFF WBC: CPT | Performed by: INTERNAL MEDICINE

## 2022-02-24 PROCEDURE — 87205 SMEAR GRAM STAIN: CPT | Performed by: INTERNAL MEDICINE

## 2022-02-24 PROCEDURE — 87070 CULTURE OTHR SPECIMN AEROBIC: CPT | Performed by: INTERNAL MEDICINE

## 2022-02-24 RX ORDER — NOREPINEPHRINE BITARTRATE/D5W 4MG/250ML
0-3 PLASTIC BAG, INJECTION (ML) INTRAVENOUS CONTINUOUS
Status: DISCONTINUED | OUTPATIENT
Start: 2022-02-24 | End: 2022-02-26

## 2022-02-24 RX ORDER — PREDNISONE 50 MG/1
50 TABLET ORAL DAILY
Status: DISCONTINUED | OUTPATIENT
Start: 2022-02-24 | End: 2022-02-24

## 2022-02-24 RX ORDER — FUROSEMIDE 10 MG/ML
60 INJECTION INTRAMUSCULAR; INTRAVENOUS EVERY 8 HOURS
Status: DISCONTINUED | OUTPATIENT
Start: 2022-02-24 | End: 2022-02-28

## 2022-02-24 RX ORDER — PROPOFOL 10 MG/ML
0-50 INJECTION, EMULSION INTRAVENOUS CONTINUOUS
Status: DISCONTINUED | OUTPATIENT
Start: 2022-02-24 | End: 2022-02-25

## 2022-02-24 RX ORDER — ENOXAPARIN SODIUM 100 MG/ML
40 INJECTION SUBCUTANEOUS EVERY 24 HOURS
Status: DISCONTINUED | OUTPATIENT
Start: 2022-02-25 | End: 2022-03-05 | Stop reason: HOSPADM

## 2022-02-24 RX ORDER — ENOXAPARIN SODIUM 100 MG/ML
1 INJECTION SUBCUTANEOUS
Status: COMPLETED | OUTPATIENT
Start: 2022-02-24 | End: 2022-02-24

## 2022-02-24 RX ORDER — PREDNISONE 50 MG/1
50 TABLET ORAL DAILY
Status: DISCONTINUED | OUTPATIENT
Start: 2022-02-24 | End: 2022-02-26

## 2022-02-24 RX ORDER — FUROSEMIDE 10 MG/ML
60 INJECTION INTRAMUSCULAR; INTRAVENOUS EVERY 6 HOURS
Status: DISCONTINUED | OUTPATIENT
Start: 2022-02-24 | End: 2022-02-24

## 2022-02-24 RX ORDER — FENTANYL CITRATE-0.9 % NACL/PF 10 MCG/ML
0-250 PLASTIC BAG, INJECTION (ML) INTRAVENOUS CONTINUOUS
Status: DISCONTINUED | OUTPATIENT
Start: 2022-02-24 | End: 2022-02-25

## 2022-02-24 RX ORDER — IPRATROPIUM BROMIDE AND ALBUTEROL SULFATE 2.5; .5 MG/3ML; MG/3ML
3 SOLUTION RESPIRATORY (INHALATION) EVERY 6 HOURS
Status: DISCONTINUED | OUTPATIENT
Start: 2022-02-24 | End: 2022-02-28

## 2022-02-24 RX ADMIN — PROPOFOL 30 MCG/KG/MIN: 10 INJECTION, EMULSION INTRAVENOUS at 09:02

## 2022-02-24 RX ADMIN — Medication 10 ML: at 12:02

## 2022-02-24 RX ADMIN — Medication 25 MCG/HR: at 12:02

## 2022-02-24 RX ADMIN — PROPOFOL 5 MCG/KG/MIN: 10 INJECTION, EMULSION INTRAVENOUS at 12:02

## 2022-02-24 RX ADMIN — CHLORHEXIDINE GLUCONATE 0.12% ORAL RINSE 15 ML: 1.2 LIQUID ORAL at 08:02

## 2022-02-24 RX ADMIN — POTASSIUM CHLORIDE 60 MEQ: 14.9 INJECTION, SOLUTION INTRAVENOUS at 03:02

## 2022-02-24 RX ADMIN — IPRATROPIUM BROMIDE AND ALBUTEROL SULFATE 3 ML: 2.5; .5 SOLUTION RESPIRATORY (INHALATION) at 07:02

## 2022-02-24 RX ADMIN — FAMOTIDINE 20 MG: 10 INJECTION INTRAVENOUS at 08:02

## 2022-02-24 RX ADMIN — PIPERACILLIN AND TAZOBACTAM 4.5 G: 4; .5 INJECTION, POWDER, LYOPHILIZED, FOR SOLUTION INTRAVENOUS; PARENTERAL at 08:02

## 2022-02-24 RX ADMIN — PROPOFOL 20 MCG/KG/MIN: 10 INJECTION, EMULSION INTRAVENOUS at 04:02

## 2022-02-24 RX ADMIN — ATORVASTATIN CALCIUM 80 MG: 40 TABLET, FILM COATED ORAL at 08:02

## 2022-02-24 RX ADMIN — IPRATROPIUM BROMIDE AND ALBUTEROL SULFATE 3 ML: 2.5; .5 SOLUTION RESPIRATORY (INHALATION) at 02:02

## 2022-02-24 RX ADMIN — NOREPINEPHRINE BITARTRATE 0.02 MCG/KG/MIN: 4 INJECTION, SOLUTION INTRAVENOUS at 07:02

## 2022-02-24 RX ADMIN — FAMOTIDINE 20 MG: 10 INJECTION INTRAVENOUS at 09:02

## 2022-02-24 RX ADMIN — PIPERACILLIN AND TAZOBACTAM 4.5 G: 4; .5 INJECTION, POWDER, LYOPHILIZED, FOR SOLUTION INTRAVENOUS; PARENTERAL at 05:02

## 2022-02-24 RX ADMIN — PREDNISONE 50 MG: 50 TABLET ORAL at 01:02

## 2022-02-24 RX ADMIN — VANCOMYCIN HYDROCHLORIDE 1500 MG: 1.5 INJECTION, POWDER, LYOPHILIZED, FOR SOLUTION INTRAVENOUS at 03:02

## 2022-02-24 RX ADMIN — MUPIROCIN: 20 OINTMENT TOPICAL at 08:02

## 2022-02-24 RX ADMIN — DEXMEDETOMIDINE HYDROCHLORIDE 0.9 MCG/KG/HR: 400 INJECTION, SOLUTION INTRAVENOUS at 02:02

## 2022-02-24 RX ADMIN — VANCOMYCIN HYDROCHLORIDE 1500 MG: 1.5 INJECTION, POWDER, LYOPHILIZED, FOR SOLUTION INTRAVENOUS at 05:02

## 2022-02-24 RX ADMIN — FUROSEMIDE 60 MG: 10 INJECTION, SOLUTION INTRAMUSCULAR; INTRAVENOUS at 01:02

## 2022-02-24 RX ADMIN — Medication 10 ML: at 07:02

## 2022-02-24 RX ADMIN — Medication 10 ML: at 05:02

## 2022-02-24 RX ADMIN — ENOXAPARIN SODIUM 100 MG: 100 INJECTION SUBCUTANEOUS at 06:02

## 2022-02-24 RX ADMIN — MUPIROCIN: 20 OINTMENT TOPICAL at 09:02

## 2022-02-24 RX ADMIN — ENOXAPARIN SODIUM 100 MG: 100 INJECTION SUBCUTANEOUS at 05:02

## 2022-02-24 RX ADMIN — CHLORHEXIDINE GLUCONATE 0.12% ORAL RINSE 15 ML: 1.2 LIQUID ORAL at 09:02

## 2022-02-24 RX ADMIN — PIPERACILLIN AND TAZOBACTAM 4.5 G: 4; .5 INJECTION, POWDER, LYOPHILIZED, FOR SOLUTION INTRAVENOUS; PARENTERAL at 12:02

## 2022-02-24 RX ADMIN — POTASSIUM CHLORIDE 40 MEQ: 29.8 INJECTION, SOLUTION INTRAVENOUS at 01:02

## 2022-02-24 RX ADMIN — FUROSEMIDE 60 MG: 10 INJECTION, SOLUTION INTRAMUSCULAR; INTRAVENOUS at 09:02

## 2022-02-24 RX ADMIN — CLOPIDOGREL 75 MG: 75 TABLET, FILM COATED ORAL at 08:02

## 2022-02-24 RX ADMIN — FUROSEMIDE 60 MG: 10 INJECTION, SOLUTION INTRAMUSCULAR; INTRAVENOUS at 08:02

## 2022-02-24 RX ADMIN — PROPOFOL 35 MCG/KG/MIN: 10 INJECTION, EMULSION INTRAVENOUS at 03:02

## 2022-02-24 RX ADMIN — ASPIRIN 81 MG CHEWABLE TABLET 81 MG: 81 TABLET CHEWABLE at 08:02

## 2022-02-24 NOTE — CONSULTS
[facility] - Intensive Care  Palliative Medicine  Consult Note    Patient Name: Kashif Iverson  MRN: [id number]  Admission Date: 2/22/2022  Hospital Length of Stay: 1 days  Code Status: Full Code   Attending Provider: Abbey Conti MD  Consulting Provider: Eli Arango NP  Primary Care Physician: Eduardo Ruiz MD  Principal Problem:Acute respiratory failure with hypoxia and hypercapnia    Patient information was obtained from patient, spouse/SO, past medical records and primary team.      Inpatient consult to Palliative Care  Consult performed by: Eli Arango NP  Consult ordered by: Shania Tran MD  Reason for consult: GOC        Assessment/Plan:   Palliative encounter:  -Discussed in ICU IDT rounds this am  -Discussed with Dr Disla primary [facility] medicine  -chart review  -At time of consult patient in ICU. He was recently extubated. His spouse is at bedside.  -Introduced myself and my role  -Discussed patient's current clinical condition. He said he couldn't breath at home and had never felt like that before He said it was the worst feeling ever.   -We also discussed his lung ca and palliative chemo. He has been on palliative chemo x 2 years and has been tolerating well.   Patient had discussion with Dr Disla earlier about his GOC.   -Confirmed with patient that he wants to continue aggressive tx and   to remain a full code at this time.    Acute respiratory failure with hypoxia and hypercapnia  Mgmt per primary  Patient was intubated but now extubated      High anion gap metabolic acidosis  Resolved        NSTEMI (non-ST elevated myocardial infarction)   noted   CARDS consulted  Mgmt per primary     Hyperglycemia  Noted  New dx?  Mgmt per primary      Lactic acidosis  Resolved with fluids        Hyperphosphatemia  Noted  Mgmt per primary     Hypokalemia  Noted  Mgmt per primary      Primary hypertension  Antihypertensives on hold per primary         Secondary malignant neoplasm of bone  Noted  "        Lung cancer, main bronchus, left  Noted.  stage 4. On palliative chemotherapy. Followed by Dr Hathaway as outpatient        History of completed stroke  Noted         Macrocytic anemia  noted                     Thank you for your consult. I will follow-up with patient. Please contact us if you have any additional questions.    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 Patient is currently intubated and unable to provide history. His wife, who is at bedside, states he was in his normal state up until today. States he had been eating and drinking just fine but did have to hide his "coke" which he drinks in excess. Apparently had "coke" again this AM as he felt that would help his breathing. He is currently on palliative chemotherapy with Gemcitabine, last dose given 2/15/2022 (about a week ago). Is no longer smoking.      In the ED, patient had progressive respiratory distress. Became diaphoretic and confused. Was also pulling oxygen mask. Patient was therefore intubated. Labwork significant for lactic acidosis of 8.7, hyperglycemia, HAGMA, hypokalemia, mild hyponatremia, hyperphosphatemia, BNP 700s, hyaline casts in UA and troponin 0.9. No ST segment elevation or depression. Cardiology and pulmonology consulted. Given fluids, empiric abx and admitted to ICU.         Overview/Hospital Course:  Mr Iverson presented with acute respiratory failure with hypercapnia/hypoxia. Intubated in the ED. Started on broad spectrum antibiotics. Blood and resp cultures obtained. No pneumothorax, large consolidation or pulmonary embolism but with moderate right sided pleural effusion and known left main bronchial cancerous mass. Given fluids. Lactic acidosis resolved. Questionable diabetic ketoacidosis given hyperglycemia and high anion gap metabolic acidosis. " Placed on insulin infusion. Also started on full dose anticoagulation, double antiplatelet therapy and statin for suspected NSTEMI. Cardiology and pulmonology consulted.          Hospital Course:  No notes on file    Interval History: patient just extubated and on O2 BNC. He is alert and oriented but  voice weak. He reports his breathing is fine     Past Medical History:   Diagnosis Date    Hypertension     Lung cancer     NSCLC    Lung mass     left lung       Past Surgical History:   Procedure Laterality Date    BRONCHOSCOPY N/A 8/30/2019    Procedure: Bronchoscopy;  Surgeon: [facility] Diagnostic Provider;  Location: [facility] OR [address] [place] [address];  Service: Anesthesiology;  Laterality: N/A;       Review of patient's allergies indicates:  No Known Allergies    Medications:  Continuous Infusions:  Scheduled Meds:   aspirin  81 mg Per OG tube Daily    atorvastatin  80 mg Per OG tube Daily    chlorhexidine  15 mL Mouth/Throat BID    clopidogreL  75 mg Per OG tube Daily    enoxaparin  1 mg/kg Subcutaneous Q12H    famotidine (PF)  20 mg Intravenous Q12H    furosemide (LASIX) injection  60 mg Intravenous Daily    mupirocin   Nasal BID    piperacillin-tazobactam (ZOSYN) IVPB  4.5 g Intravenous Q8H    sodium chloride 0.9%  10 mL Intravenous Q6H    vancomycin (VANCOCIN) IVPB  15 mg/kg Intravenous Q12H     PRN Meds:acetaminophen, calcium gluconate IVPB, calcium gluconate IVPB, calcium gluconate IVPB, dextrose 50%, dextrose 50%, glucagon (human recombinant), insulin aspart U-100, magnesium sulfate IVPB, magnesium sulfate IVPB, potassium chloride in water **AND** potassium chloride in water **AND** potassium chloride in water, Flushing PICC Protocol **AND** sodium chloride 0.9% **AND** sodium chloride 0.9%, sodium phosphate IVPB, Pharmacy to dose Vancomycin consult **AND** vancomycin - pharmacy to dose    Family History       Problem Relation (Age of Onset)    Cancer Father, Sister    Diabetes Mother    Heart defect Mother    No  Known Problems Son          Tobacco Use    Smoking status: Former Smoker     Packs/day: 1.00     Years: 25.00     Pack years: 25.00     Types: Cigarettes     Quit date:      Years since quittin.1    Smokeless tobacco: Never Used   Substance and Sexual Activity    Alcohol use: Yes     Comment: ocassionally    Drug use: Never    Sexual activity: Yes     Partners: Female       Review of Systems   Constitutional:  Positive for activity change and appetite change.   Respiratory:  Positive for shortness of breath. Negative for cough.    Cardiovascular:  Negative for chest pain.   Neurological:  Positive for weakness.   Objective:     Vital Signs (Most Recent):  Temp: 96.8 °F (36 °C) (22 1115)  Pulse: 67 (22 1400)  Resp: (!) 24 (22 1400)  BP: (!) 113/56 (22 1400)  SpO2: (!) 92 % (22 1400)   Vital Signs (24h Range):  Temp:  [96.8 °F (36 °C)-97.7 °F (36.5 °C)] 96.8 °F (36 °C)  Pulse:  [] 67  Resp:  [5-53] 24  SpO2:  [87 %-100 %] 92 %  BP: ()/(35-94) 113/56     Weight: 99.8 kg (220 lb 0.3 oz)  Body mass index is 31.57 kg/m².    Physical Exam  Vitals and nursing note reviewed.   Constitutional:       General: He is not in acute distress.     Appearance: He is obese. He is ill-appearing. He is not toxic-appearing.   HENT:      Head: Normocephalic and atraumatic.      Mouth/Throat:      Mouth: Mucous membranes are dry.   Cardiovascular:      Rate and Rhythm: Normal rate and regular rhythm.   Pulmonary:      Effort: Pulmonary effort is normal.      Comments: Oxygen bnc  Abdominal:      General: Abdomen is flat.      Palpations: Abdomen is soft.   Musculoskeletal:      Right lower leg: No edema.      Left lower leg: No edema.   Skin:     General: Skin is warm and dry.   Neurological:      Mental Status: He is alert and oriented to person, place, and time.   Psychiatric:         Mood and Affect: Mood normal.         Behavior: Behavior normal.       Review of  Symptoms      Symptom Assessment (ESAS 0-10 Scale)  Pain:  0  Dyspnea:  0  Anxiety:  0  Nausea:  0  Depression:  0  Anorexia:  0  Fatigue:  0  Insomnia:  0  Restlessness:  0  Agitation:  0       Advance Care Planning   Advance Care Planning       Significant Labs: All pertinent labs within the past 24 hours have been reviewed.  CBC:   Recent Labs   Lab 02/23/22  0407   WBC 8.01   HGB 9.3*   HCT 27.2*   MCV 96        BMP:  Recent Labs   Lab 02/23/22  0407 02/23/22  1258   * 139*   * 136   K 2.6* 3.4*    105   CO2 20* 21*   BUN 9 10   CREATININE 1.0 1.0   CALCIUM 7.5* 8.2*   MG 1.6  --      LFT:  Lab Results   Component Value Date    AST 29 02/23/2022    ALKPHOS 67 02/23/2022    BILITOT 0.8 02/23/2022     Albumin:   Albumin   Date Value Ref Range Status   02/23/2022 2.6 (L) 3.5 - 5.2 g/dL Final     Protein:   Total Protein   Date Value Ref Range Status   02/23/2022 6.0 6.0 - 8.4 g/dL Final     Lactic acid:   Lab Results   Component Value Date    LACTATE 2.6 (H) 02/22/2022    LACTATE 8.7 (HH) 02/22/2022       Significant Imaging: I have reviewed all pertinent imaging results/findings within the past 24 hours.        > 50% of 50 min visit spent in chart review, face to face discussion of goals of care,  symptom assessment, coordination of care and emotional support.    Eli Arango, NP  Palliative Medicine  [facility] - Intensive Care

## 2022-02-24 NOTE — SUBJECTIVE & OBJECTIVE
Interval History:     Review of Systems   Unable to perform ROS: Intubated   Objective:     Vital Signs (Most Recent):  Temp: 98.3 °F (36.8 °C) (02/24/22 0715)  Pulse: (!) 57 (02/24/22 0915)  Resp: (!) 8 (02/24/22 0915)  BP: 105/60 (02/24/22 0915)  SpO2: 100 % (02/24/22 0915)   Vital Signs (24h Range):  Temp:  [96.8 °F (36 °C)-99.5 °F (37.5 °C)] 98.3 °F (36.8 °C)  Pulse:  [] 57  Resp:  [0-67] 8  SpO2:  [80 %-100 %] 100 %  BP: ()/() 105/60     Weight: 99.8 kg (220 lb 0.3 oz)  Body mass index is 31.57 kg/m².     SpO2: 100 %  O2 Device (Oxygen Therapy): ventilator      Intake/Output Summary (Last 24 hours) at 2/24/2022 1025  Last data filed at 2/24/2022 0900  Gross per 24 hour   Intake 1846.24 ml   Output 5025 ml   Net -3178.76 ml       Lines/Drains/Airways       Peripherally Inserted Central Catheter Line  Duration             PICC Triple Lumen 02/22/22 2020 right brachial 1 day              Central Venous Catheter Line  Duration             Port A Cath Single Lumen right subclavian -- days              Drain  Duration                  Urethral Catheter 02/22/22 1530 Straight-tip 16 Fr. 1 day         NG/OG Tube 02/23/22 2315 Center mouth <1 day              Airway  Duration                  Airway - Non-Surgical 02/23/22 2300 Endotracheal Tube <1 day       Airway Anesthesia 02/23/22 <1 day              Peripheral Intravenous Line  Duration                  Peripheral IV - Single Lumen 02/22/22 1400 20 G Anterior;Distal;Left Upper Arm 1 day         Peripheral IV - Single Lumen 02/22/22 1737 22 G Right Upper Arm 1 day                    Physical Exam  Constitutional:       Appearance: He is well-developed.   HENT:      Head: Normocephalic and atraumatic.   Eyes:      Conjunctiva/sclera: Conjunctivae normal.      Pupils: Pupils are equal, round, and reactive to light.   Cardiovascular:      Rate and Rhythm: Normal rate.      Pulses: Intact distal pulses.      Heart sounds: Normal heart sounds.    Pulmonary:      Effort: Pulmonary effort is normal.      Breath sounds: Rales present.   Abdominal:      General: Bowel sounds are normal.      Palpations: Abdomen is soft.   Musculoskeletal:         General: Normal range of motion.      Cervical back: Normal range of motion and neck supple.   Skin:     General: Skin is warm and dry.       Significant Labs: All pertinent lab results from the last 24 hours have been reviewed.    Significant Imaging: Echocardiogram: Transthoracic echo (TTE) complete (Cupid Only):   Results for orders placed or performed during the hospital encounter of 02/22/22   Echo   Result Value Ref Range    BSA 2.29 m2    TDI SEPTAL 0.04 m/s    LV LATERAL E/E' RATIO 7.29 m/s    LV SEPTAL E/E' RATIO 12.75 m/s    LA WIDTH 4.99 cm    AORTIC VALVE CUSP SEPERATION 2.31 cm    TDI LATERAL 0.07 m/s    PV PEAK VELOCITY 0.75 cm/s    LVIDd 4.49 3.5 - 6.0 cm    IVS 1.33 (A) 0.6 - 1.1 cm    Posterior Wall 1.34 (A) 0.6 - 1.1 cm    Ao root annulus 3.92 cm    LVIDs 3.80 2.1 - 4.0 cm    FS 15 28 - 44 %    LA volume 111.72 cm3    Sinus 3.83 cm    STJ 2.92 cm    Ascending aorta 3.08 cm    LV mass 230.68 g    LA size 4.42 cm    RVDD 4.46 cm    TAPSE 1.56 cm    RV S' 9.12 cm/s    Left Ventricle Relative Wall Thickness 0.60 cm    AV mean gradient 3 mmHg    AV valve area 2.95 cm2    AV Velocity Ratio 0.63     AV index (prosthetic) 0.66     MV valve area p 1/2 method 3.41 cm2    E/A ratio 1.21     Mean e' 0.06 m/s    E wave deceleration time 222.66 msec    IVRT 283.74 msec    Pulm vein S/D ratio 1.04     LVOT diameter 2.39 cm    LVOT area 4.5 cm2    LVOT peak luigi 0.70 m/s    LVOT peak VTI 13.85 cm    Ao peak luigi 1.11 m/s    Ao VTI 21.02 cm    LVOT stroke volume 62.10 cm3    AV peak gradient 5 mmHg    E/E' ratio 9.27 m/s    MV Peak E Luigi 0.51 m/s    TR Max Luigi 2.33 m/s    MV stenosis pressure 1/2 time 64.57 ms    MV Peak A Luigi 0.42 m/s    PV Peak S Luigi 0.29 m/s    PV Peak D Luigi 0.28 m/s    LV Systolic Volume 61.96 mL     LV Systolic Volume Index 28.6 mL/m2    LV Diastolic Volume 92.20 mL    LV Diastolic Volume Index 42.49 mL/m2    LA Volume Index 51.5 mL/m2    LV Mass Index 106 g/m2    RA Major Axis 5.73 cm    Left Atrium Minor Axis 6.03 cm    Left Atrium Major Axis 5.89 cm    Triscuspid Valve Regurgitation Peak Gradient 22 mmHg    RA Width 4.32 cm    EF 35 %    Narrative    · The left ventricle is normal in size with concentric hypertrophy and   moderately decreased systolic function.  · The estimated ejection fraction is 35-40%.  · Grade I left ventricular diastolic dysfunction.  · Mild right ventricular enlargement with normal right ventricular   systolic function.  · Mild left atrial enlargement.  · Mild right atrial enlargement.  · Mild tricuspid regurgitation.

## 2022-02-24 NOTE — ASSESSMENT & PLAN NOTE
-Metastatic squamous cell carcinoma of lung diagnosed in 2019.  S/p palliative chemo with oncology.  Currently followed by Dr. Hathaway.    -repeat imaging from current hospitalization showed improve aeration when compared to 2019 imaging.

## 2022-02-24 NOTE — HPI
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 Patient is currently intubated and unable to provide history. His wife, who is at bedside, states he was in his normal state up until today. States he had been eating and drinking just fine but did have to hide his "coke" which he drinks in excess. Apparently had "coke" again this AM as he felt that would help his breathing. He is currently on palliative chemotherapy with Gemcitabine, last dose given 2/15/2022 (about a week ago). Is no longer smoking.      In the ED, patient had progressive respiratory distress. Became diaphoretic and confused. Was also pulling oxygen mask. Patient was therefore intubated. Labwork significant for lactic acidosis of 8.7, hyperglycemia, HAGMA, hypokalemia, mild hyponatremia, hyperphosphatemia, BNP 700s, hyaline casts in UA and troponin 0.9. No ST segment elevation or depression. Cardiology and pulmonology consulted. Given fluids, empiric abx and admitted to ICU.         Overview/Hospital Course:  Mr Iverson presented with acute respiratory failure with hypercapnia/hypoxia. Intubated in the ED. Started on broad spectrum antibiotics. Blood and resp cultures obtained. No pneumothorax, large consolidation or pulmonary embolism but with moderate right sided pleural effusion and known left main bronchial cancerous mass. Given fluids. Lactic acidosis resolved. Questionable diabetic ketoacidosis given hyperglycemia and high anion gap metabolic acidosis. Placed on insulin infusion. Also started on full dose anticoagulation, double antiplatelet therapy and statin for suspected NSTEMI. Cardiology and pulmonology consulted.      "

## 2022-02-24 NOTE — ANESTHESIA PROCEDURE NOTES
Ad Hoc Intubation    Date/Time: 2/23/2022 11:35 PM  Performed by: Jevon Sepulveda MD  Authorized by: Jevon Sepulveda MD     Indications:  Respiratory distress  Diagnosis:  Acute respiratory distress  Patient Location:  ICU  Timeout:  2/23/2022 11:30 PM  Procedure Start Time:  2/23/2022 11:31 PM  Procedure End Time:  2/23/2022 11:35 PM  Staff:     Anesthesiologist Present: Yes    Intubation:     Induction:  Intravenous    Intubated:  Postinduction    Mask Ventilation:  N/a    Attempts:  1    Attempted By:  Staff anesthesiologist    Method of Intubation:  Direct    Blade:  Nida 3    Laryngeal View Grade: Grade I - full view of chords      Difficult Airway Encountered?: No      Complications:  None    Airway Device:  Oral endotracheal tube    Airway Device Size:  7.5    Style/Cuff Inflation:  Cuffed    Inflation Amount (mL):  4    Tube secured:  22    Secured at:  The teeth    Placement Verified By:  Colorimetric ETCO2 device    Complicating Factors:  None    Findings Post-Intubation:  BS equal bilateral

## 2022-02-24 NOTE — ASSESSMENT & PLAN NOTE
-intubated due to agitation and hypoxemia  -cxr without detrimental changes when compared to prior cxr.  CT with georges and lll atelectasis from underlying lung disease.  Overall, left lung aeration is better when compared to 2019 imaging.  -new right effusion.  Suspect secondary to volume overload with HFrEF  -will hold ivf and start a trial of lasix.    -mildly elevated procal.  Currently on vanco/zosyn.  Unable to collect sputum.  recommend 5 days course.    -extubated on 2/23/22 after sbt.  Doing well on nasal canula for about 10 hours prior to reintubation.  ?flash pulmonary edema vs etoh withdrawal.    -back on minimal vent support in term of oxygenation.    -will keep on vent today for more aggressive diuresis.  -check cardiac enzymes.  -will start empiric steroid due smoking history along with respiratory decompensation of unclear etiology.

## 2022-02-24 NOTE — PLAN OF CARE
[facility] - Intensive Care  Initial Discharge Assessment       Primary Care Provider: Eduardo Ruiz MD    Admission Diagnosis: Shortness of breath [R06.02]  SOB (shortness of breath) [R06.02]  Tachycardia [R00.0]  NSTEMI (non-ST elevated myocardial infarction) [I21.4]  Atrial fibrillation with rapid ventricular response [I48.91]  Respiratory failure with hypoxia [J96.91]  Acute respiratory failure with hypoxia [J96.01]  Malignant neoplasm of lung, unspecified laterality, unspecified part of lung [C34.90]    Admission Date: 2/22/2022  Expected Discharge Date: TBD    Discharge Barriers Identified: Other (see comments) (No Rx Coverage)    Payor: MEDICARE / Plan: MEDICARE PART A & B / Product Type: Government /     Extended Emergency Contact Information  Primary Emergency Contact: Natty Iverson  Mobile Phone: [phone]  Relation: Spouse  Preferred language: English   needed? No    Discharge Plan A: Home with family (Patient has OP therapy sessions scheduled)  Discharge Plan B:  (TBD)      [id number] [address] AT [address] [address]  Phone: [phone] Fax: [phone]      Initial Assessment (most recent)       Adult Discharge Assessment - 02/23/22 1817          Discharge Assessment    Assessment Type Discharge Planning Assessment     Confirmed/corrected address, phone number and insurance Yes     Confirmed Demographics Correct on Facesheet     Source of Information family     When was your last doctors appointment? --   couple of months ago    Does patient/caregiver understand observation status --   n/a    Communicated [place] with patient/caregiver Date not available/Unable to determine     Reason For Admission Shortness of breath     Lives With spouse     Facility Arrived From: home     Do you expect to return to your current living situation? Yes     Do you have help at home or someone to help you manage your care  at home? Yes     Who are your caregiver(s) and their phone number(s)? wife; Natty Iverson  [phone]     Current cognitive status: Unable to Assess     Walking or Climbing Stairs Difficulty none     Dressing/Bathing Difficulty none     Readmission within 30 days? No     Patient currently being followed by outpatient case management? No     Do you currently have service(s) that help you manage your care at home? No     Do you take prescription medications? Yes     Do you have prescription coverage? No     Do you have any problems affording any of your prescribed medications? Yes     If yes, what medications? BP medication was $80.  Unable to pay for it at the pharmacy.     Is the patient taking medications as prescribed? no     If no, which medications is patient not taking? BP medication;  unable to afford cost.     Who is going to help you get home at discharge? wife; Natty Iverson;  [phone]     How do you get to doctors appointments? family or friend will provide     Are you on dialysis? No     Do you take coumadin? No     Discharge Plan A Home with family   Patient has OP therapy sessions scheduled    Discharge Plan B --   TBD    DME Needed Upon Discharge  oxygen   TBD    Discharge Plan discussed with: Spouse/sig other     Name(s) and Number(s) Rama Crane  [phone]     Discharge Barriers Identified Other (see comments)   No Rx Coverage       Relationship/Environment    Name(s) of Who Lives With Patient Spouse                 Discharge planning assessent completed with assistance from patient's spouse.  Patient from home with spouse and needed some assistance with ADLs.  Patient dx with stage IV ca.  Wife stated that patient becomes confused at times.  Patient has not been able to afford some of his medications.  Wife reportedly just learned that patient had Medicare Parts A & B.  Patient does not have any drug coverage.  RODOLFO left a message for this [facility]'s medicaid representative  at [phone]  inquiring if patient might apply for medicaid to assist with prescriptions.   will also share information with patient's wife on how to apply for Medicare Part D.

## 2022-02-24 NOTE — NURSING
2245 Pt with agitation and confusion, pt pulling at lines and states he is having a panic attack, eICU MD Patel notified, new order received.   2255 Pt agitation increasing, with sudden jerking movements and pt attempting to get out of bed, multiple nurses to refrain patient from exiting bed, HR elevated, EKG done showing sinus tachycardia and BP elevated, MD Patel notified, new orders received.   2315 Pt with increased respiratory rate and O2 sats decreased, pt placed on 15L NRB, MD Patel notified, new orders received.    2330 Pt intubated per MD Sepulveda.   0000 Pt with agitation on ventilator, MD Patel notified and new orders received.

## 2022-02-24 NOTE — EICU
"Notified that patient pulled off his EKG leads    He states he feels like he is having a panic attack  When asked if he drinks alcohol regularly he says "every now and then"  On hooking to telemetry HR 150s, /115    Patient takes atenolol at home  Previous EKGs sinus tachycardia and MAT  Echo EF 35-40% with Grade 1 diastolic dysfunction    Initially ordered xanax PO prn and labetalol 10 mg IV prn. Patient became increasingly agitated requiring several staff to hold him down  Ativan 2 mg ordered and given    Patient continues to be agitated with desaturation and increased work of breathing. Now on 100% NRB mask with O2 sat 86%    Arrange for re-intubation  CXR, Precedex ordered              "

## 2022-02-24 NOTE — PROGRESS NOTES
[facility] - [place] Intensive Care  Pulmonology  Progress Note    Patient Name: Kashif Iverson  MRN: [id number]  Admission Date: 2/22/2022  Hospital Length of Stay: 2 days  Code Status: Full Code  Attending Provider: Abbey Conti MD  Primary Care Provider: Eduardo Ruiz MD   Principal Problem: Acute respiratory failure with hypoxia and hypercapnia    Subjective:     Interval History: patient was reintubated due tachypnea, hypertensive and desaturation.  Currently on 50% FiO2 and propofol and fentanyl for sedation.      Objective:     Vital Signs (Most Recent):  Temp: 98.3 °F (36.8 °C) (02/24/22 0715)  Pulse: (!) 59 (02/24/22 0830)  Resp: (!) 9 (02/24/22 0830)  BP: (!) 86/55 (02/24/22 0830)  SpO2: 100 % (02/24/22 0830)   Vital Signs (24h Range):  Temp:  [96.8 °F (36 °C)-99.5 °F (37.5 °C)] 98.3 °F (36.8 °C)  Pulse:  [] 59  Resp:  [0-67] 9  SpO2:  [80 %-100 %] 100 %  BP: ()/() 86/55     Weight: 99.8 kg (220 lb 0.3 oz)  Body mass index is 31.57 kg/m².      Intake/Output Summary (Last 24 hours) at 2/24/2022 0854  Last data filed at 2/24/2022 0800  Gross per 24 hour   Intake 1747.75 ml   Output 4450 ml   Net -2702.25 ml       Physical Exam  Constitutional:       Appearance: He is well-developed.   HENT:      Head: Normocephalic and atraumatic.   Eyes:      Conjunctiva/sclera: Conjunctivae normal.      Pupils: Pupils are equal, round, and reactive to light.   Cardiovascular:      Rate and Rhythm: Normal rate and regular rhythm.      Heart sounds: Normal heart sounds.   Pulmonary:      Effort: Pulmonary effort is normal.      Breath sounds: Normal breath sounds.   Abdominal:      General: Bowel sounds are normal.      Palpations: Abdomen is soft.   Musculoskeletal:         General: Normal range of motion.      Cervical back: Normal range of motion and neck supple.   Skin:     General: Skin is warm.   Neurological:      Cranial Nerves: No cranial nerve deficit.      Comments: Sedated on vent.       Vents:  Vent  Mode: PRVC (02/24/22 0738)  Ventilator Initiated: Yes (02/23/22 2311)  Set Rate: 16 BPM (02/24/22 0738)  Vt Set: 450 mL (02/24/22 0738)  Pressure Support: 5 cmH20 (02/23/22 1145)  PEEP/CPAP: 5 cmH20 (02/24/22 0738)  Oxygen Concentration (%): 50 (02/24/22 0830)  Peak Airway Pressure: 15.9 cmH2O (02/24/22 0738)  Total Ve: 8.96 mL (02/24/22 0738)  F/VT Ratio<105 (RSBI): (!) 31.08 (02/24/22 0738)    Lines/Drains/Airways       Peripherally Inserted Central Catheter Line  Duration             PICC Triple Lumen 02/22/22 2020 right brachial 1 day              Central Venous Catheter Line  Duration             Port A Cath Single Lumen right subclavian -- days              Drain  Duration                  Urethral Catheter 02/22/22 1530 Straight-tip 16 Fr. 1 day         NG/OG Tube 02/23/22 2315 Center mouth <1 day              Airway  Duration                  Airway - Non-Surgical 02/23/22 2300 Endotracheal Tube <1 day       Airway Anesthesia 02/23/22 <1 day              Peripheral Intravenous Line  Duration                  Peripheral IV - Single Lumen 02/22/22 1400 20 G Anterior;Distal;Left Upper Arm 1 day         Peripheral IV - Single Lumen 02/22/22 1737 22 G Right Upper Arm 1 day                    Significant Labs:    CBC/Anemia Profile:  Recent Labs   Lab 02/22/22  1452 02/23/22  0407 02/24/22  0223   WBC 8.78 8.01 11.01   HGB 11.5* 9.3* 9.2*   HCT 35.8* 27.2* 28.2*    168 153   * 96 99*   RDW 16.2* 16.4* 16.6*        Chemistries:  Recent Labs   Lab 02/22/22  1452 02/22/22  1910 02/23/22  0407 02/23/22  1258 02/24/22  0223   *   < > 133* 136 138   K 3.0*   < > 2.6* 3.4* 3.2*      < > 102 105 109   CO2 17*   < > 20* 21* 21*   BUN 8   < > 9 10 8   CREATININE 1.0   < > 1.0 1.0 0.9   CALCIUM 8.4*   < > 7.5* 8.2* 7.2*   ALBUMIN 2.9*  --  2.4* 2.6* 2.3*   PROT 6.8  --  5.5* 6.0 5.3*   BILITOT 0.7  --  0.7 0.8 0.5   ALKPHOS 76  --  57 67 60   ALT 20  --  19 18 16   AST 34  --  29 29 26   MG 1.8  --   1.6  --   --    PHOS 6.4*  --  3.7  --  4.2    < > = values in this interval not displayed.       ABGs:   Recent Labs   Lab 02/23/22  0933   PH 7.433   PCO2 31.5*   HCO3 21.0*   POCSATURATED 98   BE -3     Echo 2/23/22  Echo 2/23/22  · The left ventricle is normal in size with concentric hypertrophy and moderately decreased systolic function.  · The estimated ejection fraction is 35-40%.  · Grade I left ventricular diastolic dysfunction.  · Mild right ventricular enlargement with normal right ventricular systolic function.  · Mild left atrial enlargement.  · Mild right atrial enlargement.  · Mild tricuspid regurgitation.    Significant Imaging:  I have reviewed all pertinent imaging results/findings within the past 24 hours.  CXR: I have reviewed all pertinent results/findings within the past 24 hours and my personal findings are:  no acute change ; persistent stranding pattern on the left lung.        ABG  Recent Labs   Lab 02/23/22  0933   PH 7.433   PO2 107*   PCO2 31.5*   HCO3 21.0*   BE -3     Assessment/Plan:     * Acute respiratory failure with hypoxia and hypercapnia  -intubated due to agitation and hypoxemia  -cxr without detrimental changes when compared to prior cxr.  CT with georges and lll atelectasis from underlying lung disease.  Overall, left lung aeration is better when compared to 2019 imaging.  -new right effusion.  Suspect secondary to volume overload with HFrEF  -will hold ivf and start a trial of lasix.    -mildly elevated procal.  Currently on vanco/zosyn.  Unable to collect sputum.  recommend 5 days course.    -extubated on 2/23/22 after sbt.  Doing well on nasal canula for about 10 hours prior to reintubation.  ?flash pulmonary edema vs etoh withdrawal.    -back on minimal vent support in term of oxygenation.    -will keep on vent today for more aggressive diuresis.  -check cardiac enzymes.  -will start empiric steroid due smoking history along with respiratory decompensation of unclear  etiology.      Cardiogenic shock   Currently on low dose levophed.  Will wean as tolerated.      Acute systolic heart failure  -NSTEM with EF of 35%.  -asa and plavix.    -no plan for LHC per cards.      Palliative care encounter  -full code per Dr. Disla  -h/o metastatic malignancy undergoing palliative chemo with Dr. Gonsalez  -await palliative care inputs.    -spoken with wife today 2/24/22 and all questions answered.      High anion gap metabolic acidosis  -initial AG was 17 due to lactic acidosis.  AG has normalized.      NSTEMI (non-ST elevated myocardial infarction)  -see heart failure    Primary hypertension  -currently hypotensive  -will need ace and bb once stabilized.      Lung cancer, main bronchus, left  -Metastatic squamous cell carcinoma of lung diagnosed in 2019.  S/p palliative chemo with oncology.  Currently followed by Dr. Hathaway.    -repeat imaging from current hospitalization showed improve aeration when compared to 2019 imaging.      Former smoker  -per wife, smoking 2 ppd x 40 years  -suspect underlying copd based on delayed exhalation phase on vent.  -outpatient pft is reasonable.           Vivek Adair MD  Pulmonology  [facility] - Intensive Care    Critical Care Time: 45  minutes  Critical secondary to respiratory failure     Critical care was time spent personally by me on the following activities: development of treatment plan with patient or surrogate and bedside caregivers, discussions with consultants, evaluation of patient's response to treatment, examination of patient, ordering and performing treatments and interventions, ordering and review of laboratory studies, ordering and review of radiographic studies, pulse oximetry, re-evaluation of patient's condition.  This critical care time did not overlap with that of any other provider or involve time for any procedures.

## 2022-02-24 NOTE — ASSESSMENT & PLAN NOTE
Aspirin and clopidogrel given. Statin and full dose anticoagulation (one more dose to complete 48 hours)  Echocardiogram significant for LVEF 35-40%, which is new. Cardiology planning on ischemic workup when more stable.   BB and ACE-I when able to tolerate

## 2022-02-24 NOTE — PROGRESS NOTES
Pharmacokinetic Assessment Follow Up: IV Vancomycin    Vancomycin serum concentration assessment(s):    The trough level was drawn correctly and can be used to guide therapy at this time. The measurement is within the desired definitive target range of 10 to 20 mcg/mL.    Vancomycin Regimen Plan:    Continue regimen to Vancomycin 1500 mg IV every 12 hours with next serum trough concentration measured at 0300 prior to 4th dose on 2/26/22    Drug levels (last 3 results):  Recent Labs   Lab Result Units 02/24/22  0223   Vancomycin-Trough ug/mL 18.1       Pharmacy will continue to follow and monitor vancomycin.    Please contact pharmacy at extension [phone] for questions regarding this assessment.    Thank you for the consult,   Marlon Cervantes       Patient brief summary:  Kashif Iverson is a 61 y.o. male initiated on antimicrobial therapy with IV Vancomycin for treatment of  pneumonia    The patient's current regimen is Vancomycin 1500mg q12h    Drug Allergies:   Review of patient's allergies indicates:  No Known Allergies    Actual Body Weight:   99.8 kg    Renal Function:   Estimated Creatinine Clearance: 102 mL/min (based on SCr of 0.9 mg/dL).,     Dialysis Method (if applicable):  N/A    CBC (last 72 hours):  Recent Labs   Lab Result Units 02/22/22  1452 02/23/22  0407 02/24/22  0223   WBC K/uL 8.78 8.01 11.01   Hemoglobin g/dL 11.5* 9.3* 9.2*   Hemoglobin A1C %  --  5.0  --    Hematocrit % 35.8* 27.2* 28.2*   Platelets K/uL 219 168 153   Gran % % 49.6 47.8 71.2   Lymph % % 24.1 31.2 11.0*   Mono % % 20.3* 18.2* 16.3*   Eosinophil % % 0.5 0.5 0.1   Basophil % % 1.4 0.9 0.5   Differential Method  Automated Automated Automated       Metabolic Panel (last 72 hours):  Recent Labs   Lab Result Units 02/22/22  1452 02/22/22  1546 02/22/22  1910 02/23/22  0129 02/23/22  0407 02/23/22  1258 02/24/22  0223   Sodium mmol/L 134*  --  135* 133* 133* 136 138   Potassium mmol/L 3.0*  --  3.5 2.5* 2.6* 3.4* 3.2*   Chloride  mmol/L 100  --  102 101 102 105 109   CO2 mmol/L 17*  --  19* 21* 20* 21* 21*   Glucose mg/dL 280*  --  142* 162* 178* 139* 160*   Glucose, UA   --  Trace*  --   --   --   --   --    BUN mg/dL 8  --  8 9 9 10 8   Creatinine mg/dL 1.0  --  0.9 0.9 1.0 1.0 0.9   Albumin g/dL 2.9*  --   --   --  2.4* 2.6* 2.3*   Total Bilirubin mg/dL 0.7  --   --   --  0.7 0.8 0.5   Alkaline Phosphatase U/L 76  --   --   --  57 67 60   AST U/L 34  --   --   --  29 29 26   ALT U/L 20  --   --   --  19 18 16   Magnesium mg/dL 1.8  --   --   --  1.6  --   --    Phosphorus mg/dL 6.4*  --   --   --  3.7  --  4.2       Vancomycin Administrations:  vancomycin given in the last 96 hours                     vancomycin 1.5 g in dextrose 5 % 250 mL IVPB (ready to mix) (mg) 1,500 mg New Bag 02/24/22 0349     1,500 mg New Bag 02/23/22 1529     1,500 mg New Bag  0259    vancomycin (VANCOCIN) 2,000 mg in dextrose 5 % 500 mL IVPB (mg) 2,000 mg New Bag 02/22/22 1445                    Microbiologic Results:  Microbiology Results (last 7 days)       Procedure Component Value Units Date/Time    Blood culture x two cultures. Draw prior to antibiotics. [[id number]] Collected: 02/22/22 1448    Order Status: Completed Specimen: Blood from Peripheral, Antecubital, Left Updated: 02/23/22 1703     Blood Culture, Routine No Growth to date      No Growth to date    Narrative:      Aerobic and anaerobic    Blood culture x two cultures. Draw prior to antibiotics. [[id number]] Collected: 02/22/22 1448    Order Status: Completed Specimen: Blood from Peripheral, Antecubital, Left Updated: 02/23/22 1703     Blood Culture, Routine No Growth to date      No Growth to date    Narrative:      Aerobic and anaerobic    Culture, Respiratory with Gram Stain [[id number]]     Order Status: No result Specimen: Respiratory from Tracheal Aspirate

## 2022-02-24 NOTE — PROGRESS NOTES
[facility] - [place] Intensive Care  Cardiology  Progress Note    Patient Name: Kashif Iverson  MRN: [id number]  Admission Date: 2/22/2022  Hospital Length of Stay: 2 days  Code Status: Full Code   Attending Physician: Abbey Conti MD   Primary Care Physician: Eduardo Ruiz MD  Expected Discharge Date:   Principal Problem:Acute respiratory failure with hypoxia and hypercapnia    Subjective:     Hospital Course:   2/24/22 Extubated yesterday re-intubated overnight. Currently NSR. BP low but stable    Echo 2/23/22  · The left ventricle is normal in size with concentric hypertrophy and moderately decreased systolic function.  · The estimated ejection fraction is 35-40%.  · Grade I left ventricular diastolic dysfunction.  · Mild right ventricular enlargement with normal right ventricular systolic function.  · Mild left atrial enlargement.  · Mild right atrial enlargement.  · Mild tricuspid regurgitation.        Interval History:     Review of Systems   Unable to perform ROS: Intubated   Objective:     Vital Signs (Most Recent):  Temp: 98.3 °F (36.8 °C) (02/24/22 0715)  Pulse: (!) 57 (02/24/22 0915)  Resp: (!) 8 (02/24/22 0915)  BP: 105/60 (02/24/22 0915)  SpO2: 100 % (02/24/22 0915)   Vital Signs (24h Range):  Temp:  [96.8 °F (36 °C)-99.5 °F (37.5 °C)] 98.3 °F (36.8 °C)  Pulse:  [] 57  Resp:  [0-67] 8  SpO2:  [80 %-100 %] 100 %  BP: ()/() 105/60     Weight: 99.8 kg (220 lb 0.3 oz)  Body mass index is 31.57 kg/m².     SpO2: 100 %  O2 Device (Oxygen Therapy): ventilator      Intake/Output Summary (Last 24 hours) at 2/24/2022 1025  Last data filed at 2/24/2022 0900  Gross per 24 hour   Intake 1846.24 ml   Output 5025 ml   Net -3178.76 ml       Lines/Drains/Airways       Peripherally Inserted Central Catheter Line  Duration             PICC Triple Lumen 02/22/22 2020 right brachial 1 day              Central Venous Catheter Line  Duration             Port A Cath Single Lumen right subclavian -- days               Drain  Duration                  Urethral Catheter 02/22/22 1530 Straight-tip 16 Fr. 1 day         NG/OG Tube 02/23/22 2315 Center mouth <1 day              Airway  Duration                  Airway - Non-Surgical 02/23/22 2300 Endotracheal Tube <1 day       Airway Anesthesia 02/23/22 <1 day              Peripheral Intravenous Line  Duration                  Peripheral IV - Single Lumen 02/22/22 1400 20 G Anterior;Distal;Left Upper Arm 1 day         Peripheral IV - Single Lumen 02/22/22 1737 22 G Right Upper Arm 1 day                    Physical Exam  Constitutional:       Appearance: He is well-developed.   HENT:      Head: Normocephalic and atraumatic.   Eyes:      Conjunctiva/sclera: Conjunctivae normal.      Pupils: Pupils are equal, round, and reactive to light.   Cardiovascular:      Rate and Rhythm: Normal rate.      Pulses: Intact distal pulses.      Heart sounds: Normal heart sounds.   Pulmonary:      Effort: Pulmonary effort is normal.      Breath sounds: Rales present.   Abdominal:      General: Bowel sounds are normal.      Palpations: Abdomen is soft.   Musculoskeletal:         General: Normal range of motion.      Cervical back: Normal range of motion and neck supple.   Skin:     General: Skin is warm and dry.       Significant Labs: All pertinent lab results from the last 24 hours have been reviewed.    Significant Imaging: Echocardiogram: Transthoracic echo (TTE) complete (Cupid Only):   Results for orders placed or performed during the hospital encounter of 02/22/22   Echo   Result Value Ref Range    BSA 2.29 m2    TDI SEPTAL 0.04 m/s    LV LATERAL E/E' RATIO 7.29 m/s    LV SEPTAL E/E' RATIO 12.75 m/s    LA WIDTH 4.99 cm    AORTIC VALVE CUSP SEPERATION 2.31 cm    TDI LATERAL 0.07 m/s    PV PEAK VELOCITY 0.75 cm/s    LVIDd 4.49 3.5 - 6.0 cm    IVS 1.33 (A) 0.6 - 1.1 cm    Posterior Wall 1.34 (A) 0.6 - 1.1 cm    Ao root annulus 3.92 cm    LVIDs 3.80 2.1 - 4.0 cm    FS 15 28 - 44 %    LA volume 111.72  cm3    Sinus 3.83 cm    STJ 2.92 cm    Ascending aorta 3.08 cm    LV mass 230.68 g    LA size 4.42 cm    RVDD 4.46 cm    TAPSE 1.56 cm    RV S' 9.12 cm/s    Left Ventricle Relative Wall Thickness 0.60 cm    AV mean gradient 3 mmHg    AV valve area 2.95 cm2    AV Velocity Ratio 0.63     AV index (prosthetic) 0.66     MV valve area p 1/2 method 3.41 cm2    E/A ratio 1.21     Mean e' 0.06 m/s    E wave deceleration time 222.66 msec    IVRT 283.74 msec    Pulm vein S/D ratio 1.04     LVOT diameter 2.39 cm    LVOT area 4.5 cm2    LVOT peak luigi 0.70 m/s    LVOT peak VTI 13.85 cm    Ao peak luigi 1.11 m/s    Ao VTI 21.02 cm    LVOT stroke volume 62.10 cm3    AV peak gradient 5 mmHg    E/E' ratio 9.27 m/s    MV Peak E Luigi 0.51 m/s    TR Max Luigi 2.33 m/s    MV stenosis pressure 1/2 time 64.57 ms    MV Peak A Luigi 0.42 m/s    PV Peak S Luigi 0.29 m/s    PV Peak D Luigi 0.28 m/s    LV Systolic Volume 61.96 mL    LV Systolic Volume Index 28.6 mL/m2    LV Diastolic Volume 92.20 mL    LV Diastolic Volume Index 42.49 mL/m2    LA Volume Index 51.5 mL/m2    LV Mass Index 106 g/m2    RA Major Axis 5.73 cm    Left Atrium Minor Axis 6.03 cm    Left Atrium Major Axis 5.89 cm    Triscuspid Valve Regurgitation Peak Gradient 22 mmHg    RA Width 4.32 cm    EF 35 %    Narrative    · The left ventricle is normal in size with concentric hypertrophy and   moderately decreased systolic function.  · The estimated ejection fraction is 35-40%.  · Grade I left ventricular diastolic dysfunction.  · Mild right ventricular enlargement with normal right ventricular   systolic function.  · Mild left atrial enlargement.  · Mild right atrial enlargement.  · Mild tricuspid regurgitation.        Assessment and Plan:     Brief HPI:     Multifocal atrial tachycardia  New Dx. Related to underlying pulmonary issues. Now resolved    High anion gap metabolic acidosis  Being treated for possible DKA    NSTEMI (non-ST elevated myocardial infarction)  Peak troponin 1.7.  Suspect demand ischemia from tachycardia, respiratory distress and acidosis. Echo with EF 35-40% ? Tachymyopathy. Will discuss ischemic evaluation when more stable    Lung cancer, main bronchus, left  Per primary        VTE Risk Mitigation (From admission, onward)         Ordered     enoxaparin injection 100 mg  Every 12 hours (non-standard times)         02/22/22 6727                Robson Green MD  Cardiology  [facility] [place] - Intensive Care

## 2022-02-24 NOTE — PROGRESS NOTES
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 Patient is currently intubated and unable to provide history. His wife, who is at bedside, states he was in his normal state up until today. States he had been eating and drinking just fine but did have to hide his "coke" which he drinks in excess. Apparently had "coke" again this AM as he felt that would help his breathing. He is currently on palliative chemotherapy with Gemcitabine, last dose given 2/15/2022 (about a week ago). Is no longer smoking.     In the ED, patient had progressive respiratory distress. Became diaphoretic and confused. Was also pulling oxygen mask. Patient was therefore intubated. Labwork significant for lactic acidosis of 8.7, hyperglycemia, HAGMA, hypokalemia, mild hyponatremia, hyperphosphatemia, BNP 700s, hyaline casts in UA and troponin 0.9. No ST segment elevation or depression. Cardiology and pulmonology consulted. Given fluids, empiric abx and admitted to ICU.       Overview/Hospital Course:  Mr Iverson presented with acute respiratory failure with hypercapnia/hypoxia. Intubated in the ED. Started on broad spectrum antibiotics. Blood and resp cultures obtained. No pneumothorax, large consolidation or pulmonary embolism but with moderate right sided pleural effusion and known left main bronchial cancerous mass. Given fluids. Lactic acidosis resolved. Questionable diabetic ketoacidosis given hyperglycemia and high anion gap metabolic acidosis. Placed on " insulin infusion. Also started on full dose anticoagulation, double antiplatelet therapy and statin for suspected NSTEMI. Glucose normalized and HAGMA resolved. Insulin discontinued. Fluids stopped. HgbA1c < 6% which is not consistent with diabetes. Cardiology and pulmonology consulted. Echocardiogram showed LVEF 35%-40% with grade 1 diastolic dysfunction. Furosemide IV given. Extubated to NC on 2/23. Overnight patient became hyperactive and encephalopathic. Experienced respiratory failure. Re-intubated.       Interval History: hyperactive encephalopathy followed by resp failure. Re-intubated last night.     Review of Systems   Unable to perform ROS: Intubated   Objective:     Vital Signs (Most Recent):  Temp: 98.3 °F (36.8 °C) (02/24/22 0715)  Pulse: (!) 54 (02/24/22 1020)  Resp: (!) 7 (02/24/22 1020)  BP: 100/65 (02/24/22 1020)  SpO2: 100 % (02/24/22 1020)   Vital Signs (24h Range):  Temp:  [96.8 °F (36 °C)-99.5 °F (37.5 °C)] 98.3 °F (36.8 °C)  Pulse:  [] 54  Resp:  [0-67] 7  SpO2:  [80 %-100 %] 100 %  BP: ()/() 100/65     Weight: 99.8 kg (220 lb 0.3 oz)  Body mass index is 31.57 kg/m².    Intake/Output Summary (Last 24 hours) at 2/24/2022 1037  Last data filed at 2/24/2022 1000  Gross per 24 hour   Intake 1923.54 ml   Output 5350 ml   Net -3426.46 ml      Physical Exam  Vitals and nursing note reviewed.   Constitutional:       General: He is not in acute distress.     Appearance: He is not toxic-appearing.   HENT:      Nose:      Comments: No secretions in ETT or OG  Pulmonary:      Breath sounds: No wheezing or rales.      Comments: Mechanical ventilation   Neurological:      Mental Status: He is alert.      Comments: Sedated    Psychiatric:      Comments: Unable to assess        Significant Labs: All pertinent labs within the past 24 hours have been reviewed.    Significant Imaging: I have reviewed all pertinent imaging results/findings within the past 24 hours.  I have reviewed and  interpreted all pertinent imaging results/findings within the past 24 hours.      Assessment/Plan:      * Acute respiratory failure with hypoxia and hypercapnia  Patient with Hypercapnic and Hypoxic Respiratory failure which is Acute.  he is not on home oxygen. Supplemental oxygen was provided and noted- Vent Mode: PRVC  Oxygen Concentration (%):  [50-65] 50  Resp Rate Total:  [20 br/min] 20 br/min  Vt Set:  [450 mL] 450 mL  PEEP/CPAP:  [5 cmH20-8 cmH20] 8 cmH20  Mean Airway Pressure:  [7.8 cmH20-10 cmH20] 10 cmH20.   Signs/symptoms of respiratory failure include- respiratory distress. Contributing diagnoses includes - I suspect cardiogenic pulmonary edema. I also suspect tachypnea to compensate for acidemia Labs and images were reviewed. Patient Has recent ABG, which has been reviewed. Will treat underlying causes and adjust management of respiratory failure as follows  Pulmonology on board for ventilator management. Based on workup thus far, acuity of condition is likely due to cardiogenic edema given new diagnosis of heart failure. Is on furosemide. I agree with empiric broad spectrum antibiotics pending infectious workup. So far BCx negative. No sputum Cx results yet. Cotninue stress ulcer prophylaxis. Start tube feeds. SAT/SBT as per pulmonology. Wife states patient became extremely paranoid when extubated yesterday. He is scared of being unable to breath and dying. Will likely need precedex when extubated. Consider psych consult.     High anion gap metabolic acidosis  Likely due to lactic acidosis. Resolved         NSTEMI (non-ST elevated myocardial infarction)  Aspirin and clopidogrel given. Statin and full dose anticoagulation (one more dose to complete 48 hours)  Echocardiogram significant for LVEF 35-40%, which is new. Cardiology planning on ischemic workup when more stable.   BB and ACE-I when able to tolerate      Hyperglycemia   HgbA1c not consistent with diabetes. Stress induced?        Lactic  acidosis  Resolved with fluids      Palliative care encounter  Advance Care Planning     Date: 02/22/2022    [place]  I engaged the wife in a conversation about advance care planning and we specifically addressed what the goals of care would be moving forward, in light of the patient's change in clinical status.  We did specifically address the patient's likely prognosis, which is fair .  We explored the patient's values and preferences for future care.  The wife endorses that what is most important right now is to focus on curative/life-prolongation (regardless of treatment burdens)    Accordingly, we have decided that the best plan to meet the patient's goals includes continuing with treatment    I did not explain the role for hospice care at this stage of the patient's illness, including its ability to help the patient live with the best quality of life possible.  We will not be making a hospice referral.    I spent a total of > 15 minutes engaging the patient in this advance care planning discussion.    Dr Tran states she had a code status conversation with patient prior to intubation. Patient prefers aggressive care and full code. Wife confirms that these are his wishes.              Hyperphosphatemia  PTH high. Hyperphosphatemia resolved. iCa pending      Hypokalemia  Replaced. Repeat CMP at noon      Primary hypertension  BP not high. Hold off on antihypertensives      Immunodeficiency due to drugs  Noted. Is not leukopenic or neutropenic      Secondary malignant neoplasm of bone  Noted       Lung cancer, main bronchus, left  Noted. Is stage 4. On palliative chemotherapy. Followed by Dr Hathaway as outpatient      History of completed stroke  Noted       Macrocytic anemia  No indication for transfusion at this time    Former smoker  Per patient's wife, patient has been abstinent for 3 years      VTE Risk Mitigation (From admission, onward)         Ordered     enoxaparin injection 40 mg  Daily         02/24/22 8801      enoxaparin injection 100 mg  Every 12 hours (non-standard times)         02/24/22 1446                Discharge Planning   AZ:      Code Status: Full Code   Is the patient medically ready for discharge?:     Reason for patient still in hospital (select all that apply): Treatment  Discharge Plan A: Home with family (Patient has OP therapy sessions scheduled)        Discussed with wife over the phone.     Critical care time spent on the evaluation and treatment of severe organ dysfunction, review of pertinent labs and imaging studies, discussions with consulting providers and discussions with patient/family: > 35 minutes.      Abbey Disla MD  Department of Hospital Medicine   [facility] - Intensive Care

## 2022-02-24 NOTE — ASSESSMENT & PLAN NOTE
-per wife, smoking 2 ppd x 40 years  -suspect underlying copd based on delayed exhalation phase on vent.  -outpatient pft is reasonable.

## 2022-02-24 NOTE — PROGRESS NOTES
SW placed information on bedside table on how to apply for Medicare Part D.  SW called patient's wife, Natty, to advise.

## 2022-02-24 NOTE — SUBJECTIVE & OBJECTIVE
Interval History: patient was reintubated due tachypnea, hypertensive and desaturation.  Currently on 50% FiO2 and propofol and fentanyl for sedation.      Objective:     Vital Signs (Most Recent):  Temp: 98.3 °F (36.8 °C) (02/24/22 0715)  Pulse: (!) 59 (02/24/22 0830)  Resp: (!) 9 (02/24/22 0830)  BP: (!) 86/55 (02/24/22 0830)  SpO2: 100 % (02/24/22 0830)   Vital Signs (24h Range):  Temp:  [96.8 °F (36 °C)-99.5 °F (37.5 °C)] 98.3 °F (36.8 °C)  Pulse:  [] 59  Resp:  [0-67] 9  SpO2:  [80 %-100 %] 100 %  BP: ()/() 86/55     Weight: 99.8 kg (220 lb 0.3 oz)  Body mass index is 31.57 kg/m².      Intake/Output Summary (Last 24 hours) at 2/24/2022 0854  Last data filed at 2/24/2022 0800  Gross per 24 hour   Intake 1747.75 ml   Output 4450 ml   Net -2702.25 ml       Physical Exam  Constitutional:       Appearance: He is well-developed.   HENT:      Head: Normocephalic and atraumatic.   Eyes:      Conjunctiva/sclera: Conjunctivae normal.      Pupils: Pupils are equal, round, and reactive to light.   Cardiovascular:      Rate and Rhythm: Normal rate and regular rhythm.      Heart sounds: Normal heart sounds.   Pulmonary:      Effort: Pulmonary effort is normal.      Breath sounds: Normal breath sounds.   Abdominal:      General: Bowel sounds are normal.      Palpations: Abdomen is soft.   Musculoskeletal:         General: Normal range of motion.      Cervical back: Normal range of motion and neck supple.   Skin:     General: Skin is warm.   Neurological:      Cranial Nerves: No cranial nerve deficit.      Comments: Sedated on vent.       Vents:  Vent Mode: PRVC (02/24/22 0738)  Ventilator Initiated: Yes (02/23/22 2311)  Set Rate: 16 BPM (02/24/22 0738)  Vt Set: 450 mL (02/24/22 0738)  Pressure Support: 5 cmH20 (02/23/22 1145)  PEEP/CPAP: 5 cmH20 (02/24/22 0738)  Oxygen Concentration (%): 50 (02/24/22 0830)  Peak Airway Pressure: 15.9 cmH2O (02/24/22 0738)  Total Ve: 8.96 mL (02/24/22 0738)  F/VT Ratio<105  (RSBI): (!) 31.08 (02/24/22 0738)    Lines/Drains/Airways       Peripherally Inserted Central Catheter Line  Duration             PICC Triple Lumen 02/22/22 2020 right brachial 1 day              Central Venous Catheter Line  Duration             Port A Cath Single Lumen right subclavian -- days              Drain  Duration                  Urethral Catheter 02/22/22 1530 Straight-tip 16 Fr. 1 day         NG/OG Tube 02/23/22 2315 Center mouth <1 day              Airway  Duration                  Airway - Non-Surgical 02/23/22 2300 Endotracheal Tube <1 day       Airway Anesthesia 02/23/22 <1 day              Peripheral Intravenous Line  Duration                  Peripheral IV - Single Lumen 02/22/22 1400 20 G Anterior;Distal;Left Upper Arm 1 day         Peripheral IV - Single Lumen 02/22/22 1737 22 G Right Upper Arm 1 day                    Significant Labs:    CBC/Anemia Profile:  Recent Labs   Lab 02/22/22  1452 02/23/22  0407 02/24/22  0223   WBC 8.78 8.01 11.01   HGB 11.5* 9.3* 9.2*   HCT 35.8* 27.2* 28.2*    168 153   * 96 99*   RDW 16.2* 16.4* 16.6*        Chemistries:  Recent Labs   Lab 02/22/22  1452 02/22/22  1910 02/23/22  0407 02/23/22  1258 02/24/22  0223   *   < > 133* 136 138   K 3.0*   < > 2.6* 3.4* 3.2*      < > 102 105 109   CO2 17*   < > 20* 21* 21*   BUN 8   < > 9 10 8   CREATININE 1.0   < > 1.0 1.0 0.9   CALCIUM 8.4*   < > 7.5* 8.2* 7.2*   ALBUMIN 2.9*  --  2.4* 2.6* 2.3*   PROT 6.8  --  5.5* 6.0 5.3*   BILITOT 0.7  --  0.7 0.8 0.5   ALKPHOS 76  --  57 67 60   ALT 20  --  19 18 16   AST 34  --  29 29 26   MG 1.8  --  1.6  --   --    PHOS 6.4*  --  3.7  --  4.2    < > = values in this interval not displayed.       ABGs:   Recent Labs   Lab 02/23/22  0933   PH 7.433   PCO2 31.5*   HCO3 21.0*   POCSATURATED 98   BE -3     Echo 2/23/22  Echo 2/23/22  The left ventricle is normal in size with concentric hypertrophy and moderately decreased systolic function.  The estimated  ejection fraction is 35-40%.  Grade I left ventricular diastolic dysfunction.  Mild right ventricular enlargement with normal right ventricular systolic function.  Mild left atrial enlargement.  Mild right atrial enlargement.  Mild tricuspid regurgitation.    Significant Imaging:  I have reviewed all pertinent imaging results/findings within the past 24 hours.  CXR: I have reviewed all pertinent results/findings within the past 24 hours and my personal findings are:  no acute change ; persistent stranding pattern on the left lung.

## 2022-02-24 NOTE — ASSESSMENT & PLAN NOTE
-full code per Dr. Disla  -h/o metastatic malignancy undergoing palliative chemo with Dr. Gonsalez  -await palliative care inputs.    -spoken with wife today 2/24/22 and all questions answered.

## 2022-02-24 NOTE — PLAN OF CARE
Pt remains in ICU. Re-intubated overnight. VSS. Propofol, precedex and fentanyl infusing for sedation, levophed infusing for MAP>65. NSR on the monitor. OG tube in place. Lemon with adequate UO. Spouse at bedside, updated on plan of care.

## 2022-02-24 NOTE — SUBJECTIVE & OBJECTIVE
Interval History: hyperactive encephalopathy followed by resp failure. Re-intubated last night.     Review of Systems   Unable to perform ROS: Intubated   Objective:     Vital Signs (Most Recent):  Temp: 98.3 °F (36.8 °C) (02/24/22 0715)  Pulse: (!) 54 (02/24/22 1020)  Resp: (!) 7 (02/24/22 1020)  BP: 100/65 (02/24/22 1020)  SpO2: 100 % (02/24/22 1020)   Vital Signs (24h Range):  Temp:  [96.8 °F (36 °C)-99.5 °F (37.5 °C)] 98.3 °F (36.8 °C)  Pulse:  [] 54  Resp:  [0-67] 7  SpO2:  [80 %-100 %] 100 %  BP: ()/() 100/65     Weight: 99.8 kg (220 lb 0.3 oz)  Body mass index is 31.57 kg/m².    Intake/Output Summary (Last 24 hours) at 2/24/2022 1037  Last data filed at 2/24/2022 1000  Gross per 24 hour   Intake 1923.54 ml   Output 5350 ml   Net -3426.46 ml      Physical Exam  Vitals and nursing note reviewed.   Constitutional:       General: He is not in acute distress.     Appearance: He is not toxic-appearing.   HENT:      Nose:      Comments: No secretions in ETT or OG  Pulmonary:      Breath sounds: No wheezing or rales.      Comments: Mechanical ventilation   Neurological:      Mental Status: He is alert.      Comments: Sedated    Psychiatric:      Comments: Unable to assess        Significant Labs: All pertinent labs within the past 24 hours have been reviewed.    Significant Imaging: I have reviewed all pertinent imaging results/findings within the past 24 hours.  I have reviewed and interpreted all pertinent imaging results/findings within the past 24 hours.

## 2022-02-25 ENCOUNTER — TELEPHONE (OUTPATIENT)
Dept: HEMATOLOGY/ONCOLOGY | Facility: CLINIC | Age: 62
End: 2022-02-25
Payer: MEDICARE

## 2022-02-25 LAB
ALBUMIN SERPL BCP-MCNC: 2.8 G/DL (ref 3.5–5.2)
ALLENS TEST: ABNORMAL
ALP SERPL-CCNC: 72 U/L (ref 55–135)
ALT SERPL W/O P-5'-P-CCNC: 18 U/L (ref 10–44)
ANION GAP SERPL CALC-SCNC: 10 MMOL/L (ref 8–16)
AST SERPL-CCNC: 25 U/L (ref 10–40)
BASOPHILS # BLD AUTO: 0.01 K/UL (ref 0–0.2)
BASOPHILS NFR BLD: 0.1 % (ref 0–1.9)
BILIRUB SERPL-MCNC: 0.7 MG/DL (ref 0.1–1)
BUN SERPL-MCNC: 9 MG/DL (ref 8–23)
CALCIUM SERPL-MCNC: 8.8 MG/DL (ref 8.7–10.5)
CHLORIDE SERPL-SCNC: 105 MMOL/L (ref 95–110)
CO2 SERPL-SCNC: 26 MMOL/L (ref 23–29)
CREAT SERPL-MCNC: 1.2 MG/DL (ref 0.5–1.4)
DELSYS: ABNORMAL
DIFFERENTIAL METHOD: ABNORMAL
EOSINOPHIL # BLD AUTO: 0 K/UL (ref 0–0.5)
EOSINOPHIL NFR BLD: 0 % (ref 0–8)
ERYTHROCYTE [DISTWIDTH] IN BLOOD BY AUTOMATED COUNT: 16.8 % (ref 11.5–14.5)
EST. GFR  (AFRICAN AMERICAN): >60 ML/MIN/1.73 M^2
EST. GFR  (NON AFRICAN AMERICAN): >60 ML/MIN/1.73 M^2
GLUCOSE SERPL-MCNC: 195 MG/DL (ref 70–110)
HCO3 UR-SCNC: 29.1 MMOL/L (ref 24–28)
HCT VFR BLD AUTO: 32.9 % (ref 40–54)
HGB BLD-MCNC: 10.6 G/DL (ref 14–18)
IMM GRANULOCYTES # BLD AUTO: 0.05 K/UL (ref 0–0.04)
IMM GRANULOCYTES NFR BLD AUTO: 0.5 % (ref 0–0.5)
LYMPHOCYTES # BLD AUTO: 1 K/UL (ref 1–4.8)
LYMPHOCYTES NFR BLD: 10.7 % (ref 18–48)
MAGNESIUM SERPL-MCNC: 1.7 MG/DL (ref 1.6–2.6)
MCH RBC QN AUTO: 31.4 PG (ref 27–31)
MCHC RBC AUTO-ENTMCNC: 32.2 G/DL (ref 32–36)
MCV RBC AUTO: 97 FL (ref 82–98)
MONOCYTES # BLD AUTO: 1.1 K/UL (ref 0.3–1)
MONOCYTES NFR BLD: 12 % (ref 4–15)
NEUTROPHILS # BLD AUTO: 7 K/UL (ref 1.8–7.7)
NEUTROPHILS NFR BLD: 76.7 % (ref 38–73)
NRBC BLD-RTO: 0 /100 WBC
PCO2 BLDA: 42.4 MMHG (ref 35–45)
PH SMN: 7.45 [PH] (ref 7.35–7.45)
PHOSPHATE SERPL-MCNC: 5 MG/DL (ref 2.7–4.5)
PLATELET # BLD AUTO: 193 K/UL (ref 150–450)
PMV BLD AUTO: 10.7 FL (ref 9.2–12.9)
PO2 BLDA: 77 MMHG (ref 80–100)
POC BE: 5 MMOL/L
POC SATURATED O2: 96 % (ref 95–100)
POC TCO2: 30 MMOL/L (ref 23–27)
POTASSIUM SERPL-SCNC: 4.5 MMOL/L (ref 3.5–5.1)
PROT SERPL-MCNC: 6.6 G/DL (ref 6–8.4)
RBC # BLD AUTO: 3.38 M/UL (ref 4.6–6.2)
SAMPLE: ABNORMAL
SITE: ABNORMAL
SODIUM SERPL-SCNC: 141 MMOL/L (ref 136–145)
WBC # BLD AUTO: 9.18 K/UL (ref 3.9–12.7)

## 2022-02-25 PROCEDURE — A4216 STERILE WATER/SALINE, 10 ML: HCPCS | Performed by: INTERNAL MEDICINE

## 2022-02-25 PROCEDURE — 85025 COMPLETE CBC W/AUTO DIFF WBC: CPT | Performed by: INTERNAL MEDICINE

## 2022-02-25 PROCEDURE — 99900035 HC TECH TIME PER 15 MIN (STAT)

## 2022-02-25 PROCEDURE — 94003 VENT MGMT INPAT SUBQ DAY: CPT

## 2022-02-25 PROCEDURE — 25000242 PHARM REV CODE 250 ALT 637 W/ HCPCS: Performed by: INTERNAL MEDICINE

## 2022-02-25 PROCEDURE — 82803 BLOOD GASES ANY COMBINATION: CPT

## 2022-02-25 PROCEDURE — 99900026 HC AIRWAY MAINTENANCE (STAT)

## 2022-02-25 PROCEDURE — 99291 PR CRITICAL CARE, E/M 30-74 MINUTES: ICD-10-PCS | Mod: ,,, | Performed by: INTERNAL MEDICINE

## 2022-02-25 PROCEDURE — 63600175 PHARM REV CODE 636 W HCPCS: Performed by: STUDENT IN AN ORGANIZED HEALTH CARE EDUCATION/TRAINING PROGRAM

## 2022-02-25 PROCEDURE — 83735 ASSAY OF MAGNESIUM: CPT | Performed by: INTERNAL MEDICINE

## 2022-02-25 PROCEDURE — 20000000 HC ICU ROOM

## 2022-02-25 PROCEDURE — 27000221 HC OXYGEN, UP TO 24 HOURS

## 2022-02-25 PROCEDURE — 25000003 PHARM REV CODE 250: Performed by: INTERNAL MEDICINE

## 2022-02-25 PROCEDURE — 80053 COMPREHEN METABOLIC PANEL: CPT | Performed by: INTERNAL MEDICINE

## 2022-02-25 PROCEDURE — 99291 CRITICAL CARE FIRST HOUR: CPT | Mod: ,,, | Performed by: INTERNAL MEDICINE

## 2022-02-25 PROCEDURE — 63600175 PHARM REV CODE 636 W HCPCS: Performed by: INTERNAL MEDICINE

## 2022-02-25 PROCEDURE — 94761 N-INVAS EAR/PLS OXIMETRY MLT: CPT

## 2022-02-25 PROCEDURE — 94640 AIRWAY INHALATION TREATMENT: CPT

## 2022-02-25 PROCEDURE — 99223 PR INITIAL HOSPITAL CARE,LEVL III: ICD-10-PCS | Mod: ,,, | Performed by: STUDENT IN AN ORGANIZED HEALTH CARE EDUCATION/TRAINING PROGRAM

## 2022-02-25 PROCEDURE — 25000003 PHARM REV CODE 250: Performed by: STUDENT IN AN ORGANIZED HEALTH CARE EDUCATION/TRAINING PROGRAM

## 2022-02-25 PROCEDURE — 36600 WITHDRAWAL OF ARTERIAL BLOOD: CPT

## 2022-02-25 PROCEDURE — 84100 ASSAY OF PHOSPHORUS: CPT | Performed by: INTERNAL MEDICINE

## 2022-02-25 PROCEDURE — 99223 1ST HOSP IP/OBS HIGH 75: CPT | Mod: ,,, | Performed by: STUDENT IN AN ORGANIZED HEALTH CARE EDUCATION/TRAINING PROGRAM

## 2022-02-25 RX ORDER — LABETALOL HYDROCHLORIDE 5 MG/ML
20 INJECTION, SOLUTION INTRAVENOUS EVERY 30 MIN PRN
Status: DISCONTINUED | OUTPATIENT
Start: 2022-02-25 | End: 2022-02-26

## 2022-02-25 RX ADMIN — MUPIROCIN: 20 OINTMENT TOPICAL at 08:02

## 2022-02-25 RX ADMIN — FUROSEMIDE 60 MG: 10 INJECTION, SOLUTION INTRAMUSCULAR; INTRAVENOUS at 01:02

## 2022-02-25 RX ADMIN — IPRATROPIUM BROMIDE AND ALBUTEROL SULFATE 3 ML: 2.5; .5 SOLUTION RESPIRATORY (INHALATION) at 07:02

## 2022-02-25 RX ADMIN — DEXMEDETOMIDINE HYDROCHLORIDE 0.5 MCG/KG/HR: 400 INJECTION, SOLUTION INTRAVENOUS at 09:02

## 2022-02-25 RX ADMIN — IPRATROPIUM BROMIDE AND ALBUTEROL SULFATE 3 ML: 2.5; .5 SOLUTION RESPIRATORY (INHALATION) at 12:02

## 2022-02-25 RX ADMIN — PIPERACILLIN AND TAZOBACTAM 4.5 G: 4; .5 INJECTION, POWDER, LYOPHILIZED, FOR SOLUTION INTRAVENOUS; PARENTERAL at 09:02

## 2022-02-25 RX ADMIN — ENOXAPARIN SODIUM 40 MG: 40 INJECTION SUBCUTANEOUS at 04:02

## 2022-02-25 RX ADMIN — IPRATROPIUM BROMIDE AND ALBUTEROL SULFATE 3 ML: 2.5; .5 SOLUTION RESPIRATORY (INHALATION) at 01:02

## 2022-02-25 RX ADMIN — DEXMEDETOMIDINE HYDROCHLORIDE 0.2 MCG/KG/HR: 400 INJECTION, SOLUTION INTRAVENOUS at 12:02

## 2022-02-25 RX ADMIN — FUROSEMIDE 60 MG: 10 INJECTION, SOLUTION INTRAMUSCULAR; INTRAVENOUS at 05:02

## 2022-02-25 RX ADMIN — CHLORHEXIDINE GLUCONATE 0.12% ORAL RINSE 15 ML: 1.2 LIQUID ORAL at 08:02

## 2022-02-25 RX ADMIN — Medication 10 ML: at 08:02

## 2022-02-25 RX ADMIN — Medication 10 ML: at 10:02

## 2022-02-25 RX ADMIN — Medication 10 ML: at 11:02

## 2022-02-25 RX ADMIN — MUPIROCIN: 20 OINTMENT TOPICAL at 09:02

## 2022-02-25 RX ADMIN — PIPERACILLIN AND TAZOBACTAM 4.5 G: 4; .5 INJECTION, POWDER, LYOPHILIZED, FOR SOLUTION INTRAVENOUS; PARENTERAL at 01:02

## 2022-02-25 RX ADMIN — FAMOTIDINE 20 MG: 10 INJECTION INTRAVENOUS at 09:02

## 2022-02-25 RX ADMIN — CLOPIDOGREL 75 MG: 75 TABLET, FILM COATED ORAL at 09:02

## 2022-02-25 RX ADMIN — Medication 10 ML: at 01:02

## 2022-02-25 RX ADMIN — ASPIRIN 81 MG CHEWABLE TABLET 81 MG: 81 TABLET CHEWABLE at 09:02

## 2022-02-25 RX ADMIN — VANCOMYCIN HYDROCHLORIDE 1500 MG: 1.5 INJECTION, POWDER, LYOPHILIZED, FOR SOLUTION INTRAVENOUS at 04:02

## 2022-02-25 RX ADMIN — PROPOFOL 30 MCG/KG/MIN: 10 INJECTION, EMULSION INTRAVENOUS at 02:02

## 2022-02-25 RX ADMIN — PREDNISONE 50 MG: 50 TABLET ORAL at 09:02

## 2022-02-25 RX ADMIN — Medication 10 ML: at 05:02

## 2022-02-25 RX ADMIN — Medication 10 ML: at 12:02

## 2022-02-25 RX ADMIN — FUROSEMIDE 60 MG: 10 INJECTION, SOLUTION INTRAMUSCULAR; INTRAVENOUS at 10:02

## 2022-02-25 RX ADMIN — FAMOTIDINE 20 MG: 10 INJECTION INTRAVENOUS at 10:02

## 2022-02-25 RX ADMIN — CHLORHEXIDINE GLUCONATE 0.12% ORAL RINSE 15 ML: 1.2 LIQUID ORAL at 09:02

## 2022-02-25 RX ADMIN — ATORVASTATIN CALCIUM 80 MG: 40 TABLET, FILM COATED ORAL at 09:02

## 2022-02-25 NOTE — SUBJECTIVE & OBJECTIVE
Oncology Treatment Plan:   OP NSCLC GEMCITABINE Q3W    Medications:  Continuous Infusions:   dexmedetomidine (PRECEDEX) infusion 0.2 mcg/kg/hr (22 1225)    NORepinephrine bitartrate-D5W Stopped (22 1445)     Scheduled Meds:   albuterol-ipratropium  3 mL Nebulization Q6H    aspirin  81 mg Per OG tube Daily    atorvastatin  80 mg Per OG tube Daily    chlorhexidine  15 mL Mouth/Throat BID    clopidogreL  75 mg Per OG tube Daily    enoxaparin  40 mg Subcutaneous Daily    famotidine (PF)  20 mg Intravenous Q12H    furosemide (LASIX) injection  60 mg Intravenous Q8H    mupirocin   Nasal BID    predniSONE  50 mg Per OG tube Daily    sodium chloride 0.9%  10 mL Intravenous Q6H     PRN Meds:acetaminophen, calcium gluconate IVPB, calcium gluconate IVPB, calcium gluconate IVPB, dextrose 50%, dextrose 50%, glucagon (human recombinant), insulin aspart U-100, labetaloL, magnesium sulfate IVPB, magnesium sulfate IVPB, potassium chloride in water **AND** potassium chloride in water **AND** potassium chloride in water, Flushing PICC Protocol **AND** sodium chloride 0.9% **AND** sodium chloride 0.9%, sodium phosphate IVPB     Review of patient's allergies indicates:  No Known Allergies     Past Medical History:   Diagnosis Date    Hypertension     Lung cancer     NSCLC    Lung mass     left lung     Past Surgical History:   Procedure Laterality Date    BRONCHOSCOPY N/A 2019    Procedure: Bronchoscopy;  Surgeon: Miguel Diagnostic Provider;  Location: [facility] OR [address] [place] [address];  Service: Anesthesiology;  Laterality: N/A;     Family History       Problem Relation (Age of Onset)    Cancer Father, Sister    Diabetes Mother    Heart defect Mother    No Known Problems Son          Tobacco Use    Smoking status: Former Smoker     Packs/day: 1.00     Years: 25.00     Pack years: 25.00     Types: Cigarettes     Quit date:      Years since quittin.1    Smokeless tobacco: Never Used   Substance and Sexual Activity    Alcohol use:  Yes     Comment: ocassionally    Drug use: Never    Sexual activity: Yes     Partners: Female       Review of Systems   Unable to perform ROS: Other (sedated)   Objective:     Vital Signs (Most Recent):  Temp: 97.6 °F (36.4 °C) (02/25/22 0715)  Pulse: 61 (02/25/22 1530)  Resp: (!) 21 (02/25/22 1530)  BP: 112/64 (02/25/22 1530)  SpO2: 100 % (02/25/22 1530)   Vital Signs (24h Range):  Temp:  [97.6 °F (36.4 °C)-98.5 °F (36.9 °C)] 97.6 °F (36.4 °C)  Pulse:  [48-75] 61  Resp:  [9-49] 21  SpO2:  [97 %-100 %] 100 %  BP: ()/(52-88) 112/64     Weight: 99.8 kg (220 lb 0.3 oz)  Body mass index is 31.57 kg/m².  Body surface area is 2.22 meters squared.      Intake/Output Summary (Last 24 hours) at 2/25/2022 1654  Last data filed at 2/25/2022 1501  Gross per 24 hour   Intake 1736.81 ml   Output 6200 ml   Net -4463.19 ml       Physical Exam  Vitals and nursing note reviewed.   Constitutional:       General: He is not in acute distress.     Appearance: He is not toxic-appearing.   HENT:      Head: Normocephalic.      Nose: Nose normal.      Mouth/Throat:      Pharynx: Oropharynx is clear.   Eyes:      General: No scleral icterus.  Cardiovascular:      Rate and Rhythm: Normal rate.   Pulmonary:      Breath sounds: No wheezing or rales.      Comments: Nasal cannula  Abdominal:      Tenderness: There is no abdominal tenderness.   Musculoskeletal:      Cervical back: Neck supple.      Right lower leg: No edema.      Left lower leg: No edema.   Lymphadenopathy:      Cervical: No cervical adenopathy.   Skin:     General: Skin is warm and dry.   Neurological:      Mental Status: He is alert.      Comments: Sedated    Psychiatric:      Comments: Unable to assess        Significant Labs:   CBC:   Recent Labs   Lab 02/24/22  0223 02/25/22  0503   WBC 11.01 9.18   HGB 9.2* 10.6*   HCT 28.2* 32.9*    193    and CMP:   Recent Labs   Lab 02/24/22  0223 02/24/22  1045 02/25/22  0503    139 141   K 3.2* 3.6 4.5    106 105    CO2 21* 25 26   * 126* 195*   BUN 8 8 9   CREATININE 0.9 1.1 1.2   CALCIUM 7.2* 8.2* 8.8   PROT 5.3*  --  6.6   ALBUMIN 2.3*  --  2.8*   BILITOT 0.5  --  0.7   ALKPHOS 60  --  72   AST 26  --  25   ALT 16  --  18   ANIONGAP 8 8 10   EGFRNONAA >60 >60 >60       Diagnostic Results:  I have reviewed all pertinent imaging results/findings within the past 24 hours.  X-Ray Chest 1 View    Result Date: 2/23/2022  EXAMINATION: XR CHEST 1 VIEW CLINICAL HISTORY: eval ETT/line placement/pna/ptx; TECHNIQUE: Single frontal view of the chest was performed. COMPARISON: 02/22/2022 chest and and CTA chest FINDINGS: Incomplete inspiration, apical lordotic positioning, large body size degrades exam.  Tube support devices stable positioning with right IJ catheter tip remaining in central right atrial chamber.  Cardia margins remain obscured by overlying infiltrate particularly left perihilar and lower lung zone in left cardiac margin.  The moderate sub pulmonic pleural effusion seen better advantage on CT obscures right mid lower lung zone by compression atelectasis.  Pulmonary vascular tree less prominent.  Small left pleural effusion particularly lateral chest wall, atelectasis with corresponding radiating star like infiltrate from left hilar region and superimposed scarring volume loss left upper lobe     Clearing of pulmonary vascular congestion.  Pleural effusions and corresponding compression atelectasis on right and atelectasis scarring left perihilar and smaller pleural effusion on left stable. Electronically signed by: Cristofer Botello MD Date:    02/23/2022 Time:    10:58    X-Ray Chest 1 View    Result Date: 2/22/2022  EXAMINATION: XR CHEST 1 VIEW CLINICAL HISTORY: picc; TECHNIQUE: Single frontal view of the chest was performed. COMPARISON: 20:04. FINDINGS: Right-sided PICC line is visualized with distal tip in the subclavian region.  Previously visualized left-sided PICC line has been removed.  No significant  change in cardiopulmonary status from earlier exam.  ET tube and enteric tube are in similar positions.  No pneumothorax.     As above. Electronically signed by: Abelino Finley MD Date:    02/22/2022 Time:    20:46    X-Ray Chest 1 View    Result Date: 2/22/2022  EXAMINATION: XR CHEST 1 VIEW CLINICAL HISTORY: picc line; TECHNIQUE: Single frontal view of the chest was performed. COMPARISON: 14:21. FINDINGS: ET tube is visualized with distal tip approximately 4 cm above the jonnathan.  Left-sided PICC line is visualized with distal tip projecting horizontally toward the right over the SVC.  No significant change in cardiopulmonary status from earlier exam.  No pneumothorax.     As above. Electronically signed by: Abelino Finley MD Date:    02/22/2022 Time:    20:20    CTA Chest Non-Coronary (PE Study)    Result Date: 2/22/2022  EXAMINATION: CTA CHEST NON CORONARY CLINICAL HISTORY: Pulmonary embolism (PE) suspected, high prob; TECHNIQUE: Low dose axial images, sagittal and coronal reformations were obtained from the thoracic inlet to the lung bases following the IV administration of 100 mL of Omnipaque 350.  Contrast timing was optimized to evaluate the pulmonary arteries.  MIP images were performed. COMPARISON: CT chest abdomen and pelvis from 01/05/2022. FINDINGS: Examination was performed in a STAT inpatient setting is not to serve as official restaging exam. Right-sided chest port is visualized with distal tip in the SVC.  Aorta is non-aneurysmal.  The heart is normal in size without pericardial effusion.  No intraluminal filling defects within the pulmonary arteries to suggest pulmonary thromboembolism.  Enteric tube extends throughout the esophagus into the stomach. ET tube is visualized with distal tip above the jonnathan.  Occlusion is seen of the left upper lobe bronchus.  Mild bronchiectasis is seen within the bilateral lower lobes.  Patient with history of left upper and lower lobe lesions.  Additional associated  subsegmental areas of consolidation/atelectasis are seen within the left upper lobe and medial aspect of the left lower lobe.  Moderate right-sided pleural effusion is seen resulting in volume loss and compressive atelectasis within the right lower lobe.  Emphysematous changes are seen with upper lobe predominance. The visualized abdominal structures show no acute abnormalities.  No acute osseous abnormality identified.  Extrathoracic soft tissues are unremarkable.     1. No evidence of PE. 2. Moderate right pleural effusion. 3. Occlusion of the left upper lobe bronchus with known left upper and lower lobe lesions with additional associated subsegmental areas of consolidation/atelectasis within the left upper and lower lobes. 4. Additional findings as detailed above. Electronically signed by: Abelino Finley MD Date:    02/22/2022 Time:    19:13    X-Ray Chest AP Portable    Result Date: 2/23/2022  EXAMINATION: XR CHEST AP PORTABLE February 23, 2022, 23:43 hours CLINICAL HISTORY: ET tube placement; TECHNIQUE: Single frontal view of the chest was performed. COMPARISON: Chest radiograph February 23, 2022, 10:16 hours FINDINGS: ET tube is noted, the distal tip above the level the jonnathan.  Enteric tube is noted, the distal tip extends subdiaphragmatic below the field of view.  Right-sided central venous catheter again noted.  Additional objects overlie the patient.  There is also a right-sided PICC line again noted. The patient is rotated, when accounting for difference in position, technique and depth of inspiration, the intrathoracic appearance is stable, persistent abnormal radiographic appearance.  There is no evidence for pneumothorax.  The osseous structures demonstrate chronic change.     Stable abnormal radiographic appearance when accounting for difference in position and technique. Electronically signed by: Jordin Sofia Date:    02/23/2022 Time:    23:55    X-Ray Chest AP Portable    Result Date:  2/22/2022  EXAMINATION: XR CHEST AP PORTABLE CLINICAL HISTORY: shortness of breath; TECHNIQUE: Single frontal view of the chest was performed. COMPARISON: Two thousand nineteen chest x-ray and CT scan chest 01/05/2022 FINDINGS: New bilateral infiltrates with partial consolidation right perihilar mid lower lung zone, obscuring of right lateral hemidiaphragm margin.  New less prominent infiltrates left perihilar mid lung zone as well as retrocardiac infrahilar left lung base with additional pleural reaction left lateral CP angle and adjacent chest wall.  Heart normal size.  Right IJ venous port stable.  Interval insertion of NG tube and endotracheal tube with ET tube tip at T6 level satisfactory above level of jonnathan.  Pulmonary vascular tree appears more more prominent and obscured by adjacent infiltrates.     New bilateral lung infiltrates.  Inflammatory, infectious, CHF etiologies favored.  The possibility of recurrent neoplasm left hilum cannot be excluded. Consideration for follow-up CT chest with IV contrast suggested as well as clinical correlation. Epic abnormal flag. Electronically signed by: Cristofer Botello MD Date:    02/22/2022 Time:    14:59    Echo    Result Date: 2/23/2022  · The left ventricle is normal in size with concentric hypertrophy and moderately decreased systolic function. · The estimated ejection fraction is 35-40%. · Grade I left ventricular diastolic dysfunction. · Mild right ventricular enlargement with normal right ventricular systolic function. · Mild left atrial enlargement. · Mild right atrial enlargement. · Mild tricuspid regurgitation.      XR Non-Rad Performed NG/Gastric Tube Check    Result Date: 2/23/2022  EXAMINATION: XR NON-RADIOLOGIST PERFORMED NG/GASTRIC TUBE CHECK CLINICAL HISTORY: Ng placement; COMPARISON: 02/22/2022. FINDINGS: Enteric tube extends well below the diaphragm curled within the gastric fundal region.  Nonspecific bowel gas pattern seen.     OG tube in place.  Electronically signed by: Abelino Finley MD Date:    02/23/2022 Time:    23:53    X-ray Abdomen for NG Tube Placement (Nursing should notify Radiology after placement)    Result Date: 2/22/2022  EXAMINATION: XR NON-RADIOLOGIST PERFORMED NG/GASTRIC TUBE CHECK CLINICAL HISTORY: OG tube placement; COMPARISON: 14:21. FINDINGS: Enteric tube extends well below the diaphragm into the stomach.  No significant change in cardiopulmonary status from earlier chest radiograph.     OG tube in place. Electronically signed by: Abelino Finley MD Date:    02/22/2022 Time:    18:15

## 2022-02-25 NOTE — ASSESSMENT & PLAN NOTE
Aspirin and clopidogrel given. Statin. Completed 48 hours of full dose anticoagulation. Echocardiogram significant for LVEF 35-40%, which is new. Cardiology planning on ischemic workup when more stable.   BB and ACE-I when able to tolerate

## 2022-02-25 NOTE — PROGRESS NOTES
[facility] Intensive Care  Cardiology  Progress Note    Patient Name: Kashif Iverson  MRN: [id number]  Admission Date: 2/22/2022  Hospital Length of Stay: 3 days  Code Status: Full Code   Attending Physician: Abbey Conti MD   Primary Care Physician: Eduardo Ruiz MD  Expected Discharge Date:   Principal Problem:Acute respiratory failure with hypoxia and hypercapnia    Subjective:         Interval History:  Patient intubated.    Review of Systems   Unable to perform ROS: Intubated   Objective:     Vital Signs (Most Recent):  Temp: 97.6 °F (36.4 °C) (02/25/22 0715)  Pulse: (!) 54 (02/25/22 1145)  Resp: 14 (02/25/22 1145)  BP: 134/68 (02/25/22 1145)  SpO2: 100 % (02/25/22 1145)   Vital Signs (24h Range):  Temp:  [97.6 °F (36.4 °C)-98.5 °F (36.9 °C)] 97.6 °F (36.4 °C)  Pulse:  [41-77] 54  Resp:  [9-49] 14  SpO2:  [97 %-100 %] 100 %  BP: ()/(56-88) 134/68     Weight: 99.8 kg (220 lb 0.3 oz)  Body mass index is 31.57 kg/m².     SpO2: 100 %  O2 Device (Oxygen Therapy): T-Piece Trial      Intake/Output Summary (Last 24 hours) at 2/25/2022 1307  Last data filed at 2/25/2022 1000  Gross per 24 hour   Intake 1740.38 ml   Output 5900 ml   Net -4159.62 ml       Lines/Drains/Airways       Peripherally Inserted Central Catheter Line  Duration             PICC Triple Lumen 02/22/22 2020 right brachial 2 days              Central Venous Catheter Line  Duration             Port A Cath Single Lumen right subclavian -- days              Drain  Duration                  Urethral Catheter 02/22/22 1530 Straight-tip 16 Fr. 2 days         NG/OG Tube 02/23/22 2315 Center mouth 1 day              Airway  Duration                  Airway - Non-Surgical 02/23/22 2300 Endotracheal Tube 1 day       Airway Anesthesia 02/23/22 1 day              Peripheral Intravenous Line  Duration                  Peripheral IV - Single Lumen 02/22/22 1400 20 G Anterior;Distal;Left Upper Arm 2 days         Peripheral IV - Single Lumen 02/22/22 1737 22 G  Right Upper Arm 2 days                    Physical Exam  Constitutional:       Appearance: He is well-developed.      Interventions: He is intubated.   HENT:      Head: Normocephalic and atraumatic.   Eyes:      Conjunctiva/sclera: Conjunctivae normal.      Pupils: Pupils are equal, round, and reactive to light.   Cardiovascular:      Rate and Rhythm: Normal rate.      Pulses: Intact distal pulses.      Heart sounds: Normal heart sounds.   Pulmonary:      Effort: Pulmonary effort is normal. He is intubated.   Abdominal:      General: Bowel sounds are normal.      Palpations: Abdomen is soft.   Musculoskeletal:         General: Normal range of motion.      Cervical back: Normal range of motion and neck supple.   Skin:     General: Skin is warm and dry.       Significant Labs: ABG:   Recent Labs   Lab 02/25/22  1205   PH 7.445   PCO2 42.4   HCO3 29.1*   POCSATURATED 96   BE 5   , Blood Culture: No results for input(s): LABBLOO in the last 48 hours., BMP:   Recent Labs   Lab 02/24/22 0223 02/24/22  1045 02/25/22  0503   * 126* 195*    139 141   K 3.2* 3.6 4.5    106 105   CO2 21* 25 26   BUN 8 8 9   CREATININE 0.9 1.1 1.2   CALCIUM 7.2* 8.2* 8.8   MG  --   --  1.7   , CMP   Recent Labs   Lab 02/24/22 0223 02/24/22  1045 02/25/22  0503    139 141   K 3.2* 3.6 4.5    106 105   CO2 21* 25 26   * 126* 195*   BUN 8 8 9   CREATININE 0.9 1.1 1.2   CALCIUM 7.2* 8.2* 8.8   PROT 5.3*  --  6.6   ALBUMIN 2.3*  --  2.8*   BILITOT 0.5  --  0.7   ALKPHOS 60  --  72   AST 26  --  25   ALT 16  --  18   ANIONGAP 8 8 10   ESTGFRAFRICA >60 >60 >60   EGFRNONAA >60 >60 >60   , CBC   Recent Labs   Lab 02/24/22 0223 02/25/22  0503   WBC 11.01 9.18   HGB 9.2* 10.6*   HCT 28.2* 32.9*    193   , INR No results for input(s): INR, PROTIME in the last 48 hours., Lipid Panel No results for input(s): CHOL, HDL, LDLCALC, TRIG, CHOLHDL in the last 48 hours., Troponin   Recent Labs   Lab 02/24/22  1048    TROPONINI 0.610*   , and All pertinent lab results from the last 24 hours have been reviewed.    Significant Imaging: Echocardiogram: Transthoracic echo (TTE) complete (Cupid Only):   Results for orders placed or performed during the hospital encounter of 02/22/22   Echo   Result Value Ref Range    BSA 2.29 m2    TDI SEPTAL 0.04 m/s    LV LATERAL E/E' RATIO 7.29 m/s    LV SEPTAL E/E' RATIO 12.75 m/s    LA WIDTH 4.99 cm    AORTIC VALVE CUSP SEPERATION 2.31 cm    TDI LATERAL 0.07 m/s    PV PEAK VELOCITY 0.75 cm/s    LVIDd 4.49 3.5 - 6.0 cm    IVS 1.33 (A) 0.6 - 1.1 cm    Posterior Wall 1.34 (A) 0.6 - 1.1 cm    Ao root annulus 3.92 cm    LVIDs 3.80 2.1 - 4.0 cm    FS 15 28 - 44 %    LA volume 111.72 cm3    Sinus 3.83 cm    STJ 2.92 cm    Ascending aorta 3.08 cm    LV mass 230.68 g    LA size 4.42 cm    RVDD 4.46 cm    TAPSE 1.56 cm    RV S' 9.12 cm/s    Left Ventricle Relative Wall Thickness 0.60 cm    AV mean gradient 3 mmHg    AV valve area 2.95 cm2    AV Velocity Ratio 0.63     AV index (prosthetic) 0.66     MV valve area p 1/2 method 3.41 cm2    E/A ratio 1.21     Mean e' 0.06 m/s    E wave deceleration time 222.66 msec    IVRT 283.74 msec    Pulm vein S/D ratio 1.04     LVOT diameter 2.39 cm    LVOT area 4.5 cm2    LVOT peak luigi 0.70 m/s    LVOT peak VTI 13.85 cm    Ao peak luigi 1.11 m/s    Ao VTI 21.02 cm    LVOT stroke volume 62.10 cm3    AV peak gradient 5 mmHg    E/E' ratio 9.27 m/s    MV Peak E Luigi 0.51 m/s    TR Max Luigi 2.33 m/s    MV stenosis pressure 1/2 time 64.57 ms    MV Peak A Luigi 0.42 m/s    PV Peak S Luigi 0.29 m/s    PV Peak D Luigi 0.28 m/s    LV Systolic Volume 61.96 mL    LV Systolic Volume Index 28.6 mL/m2    LV Diastolic Volume 92.20 mL    LV Diastolic Volume Index 42.49 mL/m2    LA Volume Index 51.5 mL/m2    LV Mass Index 106 g/m2    RA Major Axis 5.73 cm    Left Atrium Minor Axis 6.03 cm    Left Atrium Major Axis 5.89 cm    Triscuspid Valve Regurgitation Peak Gradient 22 mmHg    RA Width 4.32 cm     EF 35 %    Narrative    · The left ventricle is normal in size with concentric hypertrophy and   moderately decreased systolic function.  · The estimated ejection fraction is 35-40%.  · Grade I left ventricular diastolic dysfunction.  · Mild right ventricular enlargement with normal right ventricular   systolic function.  · Mild left atrial enlargement.  · Mild right atrial enlargement.  · Mild tricuspid regurgitation.        Assessment and Plan:     Brief HPI:     Multifocal atrial tachycardia  New Dx. Related to underlying pulmonary issues. Now resolved    High anion gap metabolic acidosis  Being treated for possible DKA    NSTEMI (non-ST elevated myocardial infarction)  Peak troponin 1.7. Suspect demand ischemia from tachycardia, respiratory distress and acidosis. Echo with EF 35-40% ? Tachymyopathy. Will discuss ischemic evaluation when more stable    Lung cancer, main bronchus, left  Per primary        VTE Risk Mitigation (From admission, onward)         Ordered     enoxaparin injection 40 mg  Daily         02/24/22 3957                Catia Roman MD  Cardiology  [facility] [place] - Intensive Care    Critical Care Time:  35 minutes     Critical care was time spent personally by me on the following activities: development of treatment plan with patient or surrogate and bedside caregivers, discussions with consultants, evaluation of patient's response to treatment, examination of patient, ordering and performing treatments and interventions, ordering and review of laboratory studies, ordering and review of radiographic studies, pulse oximetry, re-evaluation of patient's condition. This critical care time did not overlap with that of any other provider or involve time for any procedures.

## 2022-02-25 NOTE — HPI
61 year old with metastatic NSCLC on gemcitabine is admitted for acute respiratory failure requiring intubation.  Was extubated, but required reintubation.  2/25/22 was again re-extubated.  Patient is sedated.  Son is at bedside.

## 2022-02-25 NOTE — SUBJECTIVE & OBJECTIVE
Interval History: No acute issue overnight.  Diuresing well.  Off pressor.      Objective:     Vital Signs (Most Recent):  Temp: 98.5 °F (36.9 °C) (02/25/22 0400)  Pulse: 70 (02/25/22 0838)  Resp: 16 (02/25/22 0755)  BP: 133/78 (02/25/22 0752)  SpO2: 100 % (02/25/22 0838)   Vital Signs (24h Range):  Temp:  [97.7 °F (36.5 °C)-98.5 °F (36.9 °C)] 98.5 °F (36.9 °C)  Pulse:  [41-77] 70  Resp:  [6-49] 16  SpO2:  [100 %] 100 %  BP: ()/(46-88) 133/78     Weight: 99.8 kg (220 lb 0.3 oz)  Body mass index is 31.57 kg/m².      Intake/Output Summary (Last 24 hours) at 2/25/2022 0846  Last data filed at 2/25/2022 0700  Gross per 24 hour   Intake 1864.27 ml   Output 7425 ml   Net -5560.73 ml         Physical Exam  Constitutional:       Appearance: He is well-developed.   HENT:      Head: Normocephalic and atraumatic.   Eyes:      Conjunctiva/sclera: Conjunctivae normal.      Pupils: Pupils are equal, round, and reactive to light.   Cardiovascular:      Rate and Rhythm: Normal rate and regular rhythm.      Heart sounds: Normal heart sounds.   Pulmonary:      Effort: Pulmonary effort is normal.      Breath sounds: Normal breath sounds.   Abdominal:      General: Bowel sounds are normal.      Palpations: Abdomen is soft.   Musculoskeletal:         General: Normal range of motion.      Cervical back: Normal range of motion and neck supple.   Skin:     General: Skin is warm.   Neurological:      Cranial Nerves: No cranial nerve deficit.      Comments: Sedated on vent.       Vents:  Vent Mode: PC (02/25/22 0838)  Ventilator Initiated: Yes (02/23/22 2311)  Set Rate: 10 BPM (02/25/22 0838)  Vt Set: 450 mL (02/25/22 0752)  Pressure Support: 5 cmH20 (02/23/22 1145)  PEEP/CPAP: 8 cmH20 (02/25/22 0838)  Oxygen Concentration (%): 50 (02/25/22 0838)  Peak Airway Pressure: 13.9 cmH2O (02/25/22 0838)  Total Ve: 9.48 mL (02/25/22 0838)  F/VT Ratio<105 (RSBI): (!) 36.56 (02/25/22 0752)    Lines/Drains/Airways       Peripherally Inserted  Central Catheter Line  Duration             PICC Triple Lumen 02/22/22 2020 right brachial 2 days              Central Venous Catheter Line  Duration             Port A Cath Single Lumen right subclavian -- days              Drain  Duration                  Urethral Catheter 02/22/22 1530 Straight-tip 16 Fr. 2 days         NG/OG Tube 02/23/22 2315 Center mouth 1 day              Airway  Duration                  Airway - Non-Surgical 02/23/22 2300 Endotracheal Tube 1 day       Airway Anesthesia 02/23/22 1 day              Peripheral Intravenous Line  Duration                  Peripheral IV - Single Lumen 02/22/22 1400 20 G Anterior;Distal;Left Upper Arm 2 days         Peripheral IV - Single Lumen 02/22/22 1737 22 G Right Upper Arm 2 days                    Significant Labs:    CBC/Anemia Profile:  Recent Labs   Lab 02/24/22  0223 02/25/22  0503   WBC 11.01 9.18   HGB 9.2* 10.6*   HCT 28.2* 32.9*    193   MCV 99* 97   RDW 16.6* 16.8*          Chemistries:  Recent Labs   Lab 02/23/22  1258 02/24/22  0223 02/24/22  1045 02/25/22  0503    138 139 141   K 3.4* 3.2* 3.6 4.5    109 106 105   CO2 21* 21* 25 26   BUN 10 8 8 9   CREATININE 1.0 0.9 1.1 1.2   CALCIUM 8.2* 7.2* 8.2* 8.8   ALBUMIN 2.6* 2.3*  --  2.8*   PROT 6.0 5.3*  --  6.6   BILITOT 0.8 0.5  --  0.7   ALKPHOS 67 60  --  72   ALT 18 16  --  18   AST 29 26  --  25   MG  --   --   --  1.7   PHOS  --  4.2  --  5.0*         ABGs:   Recent Labs   Lab 02/23/22  0933   PH 7.433   PCO2 31.5*   HCO3 21.0*   POCSATURATED 98   BE -3       Echo 2/23/22  Echo 2/23/22  The left ventricle is normal in size with concentric hypertrophy and moderately decreased systolic function.  The estimated ejection fraction is 35-40%.  Grade I left ventricular diastolic dysfunction.  Mild right ventricular enlargement with normal right ventricular systolic function.  Mild left atrial enlargement.  Mild right atrial enlargement.  Mild tricuspid regurgitation.    Significant  Imaging:  I have reviewed all pertinent imaging results/findings within the past 24 hours.  CXR: I have reviewed all pertinent results/findings within the past 24 hours and my personal findings are:  no acute change ; persistent stranding pattern on the left lung.

## 2022-02-25 NOTE — ASSESSMENT & PLAN NOTE
Patient with Hypercapnic and Hypoxic Respiratory failure which is Acute.  he is not on home oxygen. Supplemental oxygen was provided and noted- Vent Mode: PC  Oxygen Concentration (%):  [40-50] 40  Resp Rate Total:  [11 br/min] 11 br/min  Vt Set:  [450 mL] 450 mL  PEEP/CPAP:  [8 cmH20] 8 cmH20  Mean Airway Pressure:  [9.2 raW68-43.5 cmH20] 9.2 cmH20.   Signs/symptoms of respiratory failure include- respiratory distress. Contributing diagnoses includes - I suspect cardiogenic pulmonary edema. I also suspect tachypnea to compensate for acidemia Labs and images were reviewed. Patient Has recent ABG, which has been reviewed. Will treat underlying causes and adjust management of respiratory failure as follows  Pulmonology on board for ventilator management. Based on workup thus far, acuity of condition is likely due to cardiogenic edema given new diagnosis of heart failure. Is on furosemide. As of today, no evidence of infection. Will dc antibiotics and observe. Cotninue stress ulcer prophylaxis. Hold tube feeds for SAT/SBT. Wife states patient became extremely paranoid when extubated the first time. He is scared of being unable to breath and dying. Will likely need precedex when extubated. Consider psych consult.

## 2022-02-25 NOTE — PROGRESS NOTES
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 Patient is currently intubated and unable to provide history. His wife, who is at bedside, states he was in his normal state up until today. States he had been eating and drinking just fine but did have to hide his "coke" which he drinks in excess. Apparently had "coke" again this AM as he felt that would help his breathing. He is currently on palliative chemotherapy with Gemcitabine, last dose given 2/15/2022 (about a week ago). Is no longer smoking.     In the ED, patient had progressive respiratory distress. Became diaphoretic and confused. Was also pulling oxygen mask. Patient was therefore intubated. Labwork significant for lactic acidosis of 8.7, hyperglycemia, HAGMA, hypokalemia, mild hyponatremia, hyperphosphatemia, BNP 700s, hyaline casts in UA and troponin 0.9. No ST segment elevation or depression. Cardiology and pulmonology consulted. Given fluids, empiric abx and admitted to ICU.       Overview/Hospital Course:  Mr Iverson presented with acute respiratory failure with hypercapnia/hypoxia. Intubated in the ED. Started on broad spectrum antibiotics. Blood and resp cultures obtained. No pneumothorax, large consolidation or pulmonary embolism but with moderate right sided pleural effusion and known left main bronchial cancerous mass. Given fluids. Lactic acidosis resolved. Questionable diabetic ketoacidosis given hyperglycemia and high anion gap metabolic acidosis. Placed on " insulin infusion. Also started on full dose anticoagulation, double antiplatelet therapy and statin for suspected NSTEMI. Glucose normalized and HAGMA resolved. Insulin discontinued. Fluids stopped. HgbA1c < 6% which is not consistent with diabetes. Cardiology and pulmonology consulted. Echocardiogram showed LVEF 35%-40% with grade 1 diastolic dysfunction. Furosemide IV given. Extubated to NC on 2/23. Overnight patient became hyperactive and encephalopathic. Experienced respiratory failure. Re-intubated.       Interval History: working on SAT    Review of Systems   Unable to perform ROS: Intubated   Objective:     Vital Signs (Most Recent):  Temp: 97.6 °F (36.4 °C) (02/25/22 0715)  Pulse: (!) 59 (02/25/22 1015)  Resp: 14 (02/25/22 1015)  BP: 124/70 (02/25/22 1015)  SpO2: 100 % (02/25/22 1015)   Vital Signs (24h Range):  Temp:  [97.6 °F (36.4 °C)-98.5 °F (36.9 °C)] 97.6 °F (36.4 °C)  Pulse:  [41-77] 59  Resp:  [9-49] 14  SpO2:  [97 %-100 %] 100 %  BP: ()/(55-88) 124/70     Weight: 99.8 kg (220 lb 0.3 oz)  Body mass index is 31.57 kg/m².    Intake/Output Summary (Last 24 hours) at 2/25/2022 1043  Last data filed at 2/25/2022 1000  Gross per 24 hour   Intake 1803.93 ml   Output 6750 ml   Net -4946.07 ml        Physical Exam  Vitals and nursing note reviewed.   Constitutional:       General: He is not in acute distress.     Appearance: He is not toxic-appearing.   HENT:      Nose:      Comments: No secretions in ETT or OG  Pulmonary:      Breath sounds: No wheezing or rales.      Comments: Mechanical ventilation   Neurological:      Mental Status: He is alert.      Comments: Sedated    Psychiatric:      Comments: Unable to assess        Significant Labs: All pertinent labs within the past 24 hours have been reviewed.    Significant Imaging: I have reviewed all pertinent imaging results/findings within the past 24 hours.  I have reviewed and interpreted all pertinent imaging results/findings within the past 24  hours.      Assessment/Plan:      * Acute respiratory failure with hypoxia and hypercapnia  Patient with Hypercapnic and Hypoxic Respiratory failure which is Acute.  he is not on home oxygen. Supplemental oxygen was provided and noted- Vent Mode: PC  Oxygen Concentration (%):  [40-50] 40  Resp Rate Total:  [11 br/min] 11 br/min  Vt Set:  [450 mL] 450 mL  PEEP/CPAP:  [8 cmH20] 8 cmH20  Mean Airway Pressure:  [9.2 zkA67-10.5 cmH20] 9.2 cmH20.   Signs/symptoms of respiratory failure include- respiratory distress. Contributing diagnoses includes - I suspect cardiogenic pulmonary edema. I also suspect tachypnea to compensate for acidemia Labs and images were reviewed. Patient Has recent ABG, which has been reviewed. Will treat underlying causes and adjust management of respiratory failure as follows  Pulmonology on board for ventilator management. Based on workup thus far, acuity of condition is likely due to cardiogenic edema given new diagnosis of heart failure. Is on furosemide. As of today, no evidence of infection. Will dc antibiotics and observe. Cotninue stress ulcer prophylaxis. Hold tube feeds for SAT/SBT. Wife states patient became extremely paranoid when extubated the first time. He is scared of being unable to breath and dying. Will likely need precedex when extubated. Consider psych consult.     High anion gap metabolic acidosis  Likely due to lactic acidosis. Resolved         NSTEMI (non-ST elevated myocardial infarction)  Aspirin and clopidogrel given. Statin. Completed 48 hours of full dose anticoagulation. Echocardiogram significant for LVEF 35-40%, which is new. Cardiology planning on ischemic workup when more stable.   BB and ACE-I when able to tolerate      Hyperglycemia   HgbA1c not consistent with diabetes. Stress induced?        Lactic acidosis  Resolved with fluids      Palliative care encounter  Advance Care Planning     Date: 02/22/2022    [facility]  I engaged the wife in a conversation about advance  care planning and we specifically addressed what the goals of care would be moving forward, in light of the patient's change in clinical status.  We did specifically address the patient's likely prognosis, which is fair .  We explored the patient's values and preferences for future care.  The wife endorses that what is most important right now is to focus on curative/life-prolongation (regardless of treatment burdens)    Accordingly, we have decided that the best plan to meet the patient's goals includes continuing with treatment    I did not explain the role for hospice care at this stage of the patient's illness, including its ability to help the patient live with the best quality of life possible.  We will not be making a hospice referral.    I spent a total of > 15 minutes engaging the patient in this advance care planning discussion.    Dr Tran states she had a code status conversation with patient prior to intubation. Patient prefers aggressive care and full code. Wife confirms that these are his wishes.              Hyperphosphatemia  PTH high. Hyperphosphatemia resolved. iCa pending      Hypokalemia  Replaced. Repeat CMP at noon      Primary hypertension  BP not high. Hold off on antihypertensives      Immunodeficiency due to drugs  Noted. Is not leukopenic or neutropenic      Secondary malignant neoplasm of bone  Noted       Lung cancer, main bronchus, left  Noted. Is stage 4. On palliative chemotherapy. Followed by Dr Hathaway as outpatient      History of completed stroke  Noted       Macrocytic anemia  No indication for transfusion at this time    Former smoker  Per patient's wife, patient has been abstinent for 3 years      VTE Risk Mitigation (From admission, onward)         Ordered     enoxaparin injection 40 mg  Daily         02/24/22 6567                Discharge Planning   AZ:      Code Status: Full Code   Is the patient medically ready for discharge?:     Reason for patient still in hospital  (select all that apply): Treatment  Discharge Plan A: Home with family (Patient has OP therapy sessions scheduled)        Discussed with wife at bedside. Tentative extubation today.     Critical care time spent on the evaluation and treatment of severe organ dysfunction, review of pertinent labs and imaging studies, discussions with consulting providers and discussions with patient/family: > 35 minutes.      Abbey Disla MD  Department of Hospital Medicine   [facility] - Intensive Care

## 2022-02-25 NOTE — ASSESSMENT & PLAN NOTE
-intubated due to agitation and hypoxemia  -cxr without detrimental changes when compared to prior cxr.  CT with georges and lll atelectasis from underlying lung disease.  Overall, left lung aeration is better when compared to 2019 imaging.  -new right effusion.  Suspect secondary to volume overload with HFrEF  -will hold ivf and start a trial of lasix.    -mildly elevated procal.  Currently on vanco/zosyn.  Unable to collect sputum.  recommend 5 days course.    -extubated on 2/23/22 after sbt.  Doing well on nasal canula for about 10 hours prior to reintubation.  ?flash pulmonary edema in spiked in BNP after intubation  -back on minimal vent support in term of oxygenation.    -check cardiac enzymes.  -will start empiric steroid due smoking history along with respiratory decompensation after extubation  -will place patient on t-piece and possible extubation today.

## 2022-02-25 NOTE — SUBJECTIVE & OBJECTIVE
Interval History:  Patient intubated.    Review of Systems   Unable to perform ROS: Intubated   Objective:     Vital Signs (Most Recent):  Temp: 97.6 °F (36.4 °C) (02/25/22 0715)  Pulse: (!) 54 (02/25/22 1145)  Resp: 14 (02/25/22 1145)  BP: 134/68 (02/25/22 1145)  SpO2: 100 % (02/25/22 1145)   Vital Signs (24h Range):  Temp:  [97.6 °F (36.4 °C)-98.5 °F (36.9 °C)] 97.6 °F (36.4 °C)  Pulse:  [41-77] 54  Resp:  [9-49] 14  SpO2:  [97 %-100 %] 100 %  BP: ()/(56-88) 134/68     Weight: 99.8 kg (220 lb 0.3 oz)  Body mass index is 31.57 kg/m².     SpO2: 100 %  O2 Device (Oxygen Therapy): T-Piece Trial      Intake/Output Summary (Last 24 hours) at 2/25/2022 1307  Last data filed at 2/25/2022 1000  Gross per 24 hour   Intake 1740.38 ml   Output 5900 ml   Net -4159.62 ml       Lines/Drains/Airways       Peripherally Inserted Central Catheter Line  Duration             PICC Triple Lumen 02/22/22 2020 right brachial 2 days              Central Venous Catheter Line  Duration             Port A Cath Single Lumen right subclavian -- days              Drain  Duration                  Urethral Catheter 02/22/22 1530 Straight-tip 16 Fr. 2 days         NG/OG Tube 02/23/22 2315 Center mouth 1 day              Airway  Duration                  Airway - Non-Surgical 02/23/22 2300 Endotracheal Tube 1 day       Airway Anesthesia 02/23/22 1 day              Peripheral Intravenous Line  Duration                  Peripheral IV - Single Lumen 02/22/22 1400 20 G Anterior;Distal;Left Upper Arm 2 days         Peripheral IV - Single Lumen 02/22/22 1737 22 G Right Upper Arm 2 days                    Physical Exam  Constitutional:       Appearance: He is well-developed.      Interventions: He is intubated.   HENT:      Head: Normocephalic and atraumatic.   Eyes:      Conjunctiva/sclera: Conjunctivae normal.      Pupils: Pupils are equal, round, and reactive to light.   Cardiovascular:      Rate and Rhythm: Normal rate.      Pulses: Intact distal  pulses.      Heart sounds: Normal heart sounds.   Pulmonary:      Effort: Pulmonary effort is normal. He is intubated.   Abdominal:      General: Bowel sounds are normal.      Palpations: Abdomen is soft.   Musculoskeletal:         General: Normal range of motion.      Cervical back: Normal range of motion and neck supple.   Skin:     General: Skin is warm and dry.       Significant Labs: ABG:   Recent Labs   Lab 02/25/22  1205   PH 7.445   PCO2 42.4   HCO3 29.1*   POCSATURATED 96   BE 5   , Blood Culture: No results for input(s): LABBLOO in the last 48 hours., BMP:   Recent Labs   Lab 02/24/22 0223 02/24/22  1045 02/25/22  0503   * 126* 195*    139 141   K 3.2* 3.6 4.5    106 105   CO2 21* 25 26   BUN 8 8 9   CREATININE 0.9 1.1 1.2   CALCIUM 7.2* 8.2* 8.8   MG  --   --  1.7   , CMP   Recent Labs   Lab 02/24/22 0223 02/24/22  1045 02/25/22  0503    139 141   K 3.2* 3.6 4.5    106 105   CO2 21* 25 26   * 126* 195*   BUN 8 8 9   CREATININE 0.9 1.1 1.2   CALCIUM 7.2* 8.2* 8.8   PROT 5.3*  --  6.6   ALBUMIN 2.3*  --  2.8*   BILITOT 0.5  --  0.7   ALKPHOS 60  --  72   AST 26  --  25   ALT 16  --  18   ANIONGAP 8 8 10   ESTGFRAFRICA >60 >60 >60   EGFRNONAA >60 >60 >60   , CBC   Recent Labs   Lab 02/24/22 0223 02/25/22  0503   WBC 11.01 9.18   HGB 9.2* 10.6*   HCT 28.2* 32.9*    193   , INR No results for input(s): INR, PROTIME in the last 48 hours., Lipid Panel No results for input(s): CHOL, HDL, LDLCALC, TRIG, CHOLHDL in the last 48 hours., Troponin   Recent Labs   Lab 02/24/22  1045   TROPONINI 0.610*   , and All pertinent lab results from the last 24 hours have been reviewed.    Significant Imaging: Echocardiogram: Transthoracic echo (TTE) complete (Cupid Only):   Results for orders placed or performed during the hospital encounter of 02/22/22   Echo   Result Value Ref Range    BSA 2.29 m2    TDI SEPTAL 0.04 m/s    LV LATERAL E/E' RATIO 7.29 m/s    LV SEPTAL E/E' RATIO  12.75 m/s    LA WIDTH 4.99 cm    AORTIC VALVE CUSP SEPERATION 2.31 cm    TDI LATERAL 0.07 m/s    PV PEAK VELOCITY 0.75 cm/s    LVIDd 4.49 3.5 - 6.0 cm    IVS 1.33 (A) 0.6 - 1.1 cm    Posterior Wall 1.34 (A) 0.6 - 1.1 cm    Ao root annulus 3.92 cm    LVIDs 3.80 2.1 - 4.0 cm    FS 15 28 - 44 %    LA volume 111.72 cm3    Sinus 3.83 cm    STJ 2.92 cm    Ascending aorta 3.08 cm    LV mass 230.68 g    LA size 4.42 cm    RVDD 4.46 cm    TAPSE 1.56 cm    RV S' 9.12 cm/s    Left Ventricle Relative Wall Thickness 0.60 cm    AV mean gradient 3 mmHg    AV valve area 2.95 cm2    AV Velocity Ratio 0.63     AV index (prosthetic) 0.66     MV valve area p 1/2 method 3.41 cm2    E/A ratio 1.21     Mean e' 0.06 m/s    E wave deceleration time 222.66 msec    IVRT 283.74 msec    Pulm vein S/D ratio 1.04     LVOT diameter 2.39 cm    LVOT area 4.5 cm2    LVOT peak luigi 0.70 m/s    LVOT peak VTI 13.85 cm    Ao peak luigi 1.11 m/s    Ao VTI 21.02 cm    LVOT stroke volume 62.10 cm3    AV peak gradient 5 mmHg    E/E' ratio 9.27 m/s    MV Peak E Luigi 0.51 m/s    TR Max Luigi 2.33 m/s    MV stenosis pressure 1/2 time 64.57 ms    MV Peak A Luigi 0.42 m/s    PV Peak S Luigi 0.29 m/s    PV Peak D Luigi 0.28 m/s    LV Systolic Volume 61.96 mL    LV Systolic Volume Index 28.6 mL/m2    LV Diastolic Volume 92.20 mL    LV Diastolic Volume Index 42.49 mL/m2    LA Volume Index 51.5 mL/m2    LV Mass Index 106 g/m2    RA Major Axis 5.73 cm    Left Atrium Minor Axis 6.03 cm    Left Atrium Major Axis 5.89 cm    Triscuspid Valve Regurgitation Peak Gradient 22 mmHg    RA Width 4.32 cm    EF 35 %    Narrative    · The left ventricle is normal in size with concentric hypertrophy and   moderately decreased systolic function.  · The estimated ejection fraction is 35-40%.  · Grade I left ventricular diastolic dysfunction.  · Mild right ventricular enlargement with normal right ventricular   systolic function.  · Mild left atrial enlargement.  · Mild right atrial  enlargement.  · Mild tricuspid regurgitation.

## 2022-02-25 NOTE — PLAN OF CARE
Problem: Infection  Goal: Absence of Infection Signs and Symptoms  Outcome: Ongoing, Progressing     Problem: Fall Injury Risk  Goal: Absence of Fall and Fall-Related Injury  Outcome: Ongoing, Progressing     Problem: Coping Ineffective  Goal: Effective Coping  Outcome: Ongoing, Progressing     Problem: Adult Inpatient Plan of Care  Goal: Plan of Care Review  Outcome: Ongoing, Progressing  Goal: Patient-Specific Goal (Individualized)  Outcome: Ongoing, Progressing  Goal: Absence of Hospital-Acquired Illness or Injury  Outcome: Ongoing, Progressing  Goal: Optimal Comfort and Wellbeing  Outcome: Ongoing, Progressing  Goal: Readiness for Transition of Care  Outcome: Ongoing, Progressing     Problem: Nutrition Impairment (Mechanical Ventilation, Invasive)  Goal: Optimal Nutrition Delivery  Outcome: Ongoing, Progressing     Problem: Skin and Tissue Injury (Mechanical Ventilation, Invasive)  Goal: Absence of Device-Related Skin and Tissue Injury  Outcome: Ongoing, Progressing     Problem: Skin and Tissue Injury (Artificial Airway)  Goal: Absence of Device-Related Skin or Tissue Injury  Outcome: Ongoing, Progressing     Problem: Noninvasive Ventilation Acute  Goal: Effective Unassisted Ventilation and Oxygenation  Outcome: Ongoing, Progressing     Problem: Skin Injury Risk Increased  Goal: Skin Health and Integrity  Outcome: Ongoing, Progressing     Problem: Impaired Wound Healing  Goal: Optimal Wound Healing  Outcome: Ongoing, Progressing

## 2022-02-25 NOTE — TELEPHONE ENCOUNTER
Pt wife called to inform pt still in icu. Dr. mott informed and pt wife informed he will come to see patient sometime today.

## 2022-02-25 NOTE — CONSULTS
[facility] - Intensive Care  Hematology/Oncology  Consult Note    Patient Name: Kashif Iverson  MRN: [id number]  Admission Date: 2/22/2022  Hospital Length of Stay: 3 days  Code Status: Full Code   Attending Provider: Abbey Conti MD  Consulting Provider: Mario Hathaway MD  Primary Care Physician: Eduardo Ruiz MD  Principal Problem:Acute respiratory failure with hypoxia and hypercapnia    Consults  Subjective:     HPI:  61 year old with metastatic NSCLC on gemcitabine is admitted for acute respiratory failure requiring intubation.  Was extubated, but required reintubation.  2/25/22 was again re-extubated.  Patient is sedated.  Son is at bedside.      Oncology Treatment Plan:   OP NSCLC GEMCITABINE Q3W    Medications:  Continuous Infusions:   dexmedetomidine (PRECEDEX) infusion 0.2 mcg/kg/hr (02/25/22 1225)    NORepinephrine bitartrate-D5W Stopped (02/25/22 1445)     Scheduled Meds:   albuterol-ipratropium  3 mL Nebulization Q6H    aspirin  81 mg Per OG tube Daily    atorvastatin  80 mg Per OG tube Daily    chlorhexidine  15 mL Mouth/Throat BID    clopidogreL  75 mg Per OG tube Daily    enoxaparin  40 mg Subcutaneous Daily    famotidine (PF)  20 mg Intravenous Q12H    furosemide (LASIX) injection  60 mg Intravenous Q8H    mupirocin   Nasal BID    predniSONE  50 mg Per OG tube Daily    sodium chloride 0.9%  10 mL Intravenous Q6H     PRN Meds:acetaminophen, calcium gluconate IVPB, calcium gluconate IVPB, calcium gluconate IVPB, dextrose 50%, dextrose 50%, glucagon (human recombinant), insulin aspart U-100, labetaloL, magnesium sulfate IVPB, magnesium sulfate IVPB, potassium chloride in water **AND** potassium chloride in water **AND** potassium chloride in water, Flushing PICC Protocol **AND** sodium chloride 0.9% **AND** sodium chloride 0.9%, sodium phosphate IVPB     Review of patient's allergies indicates:  No Known Allergies     Past Medical History:   Diagnosis Date    Hypertension     Lung cancer      NSCLC    Lung mass     left lung     Past Surgical History:   Procedure Laterality Date    BRONCHOSCOPY N/A 2019    Procedure: Bronchoscopy;  Surgeon: Miguel Diagnostic Provider;  Location: [facility] OR [address] [place] [address];  Service: Anesthesiology;  Laterality: N/A;     Family History       Problem Relation (Age of Onset)    Cancer Father, Sister    Diabetes Mother    Heart defect Mother    No Known Problems Son          Tobacco Use    Smoking status: Former Smoker     Packs/day: 1.00     Years: 25.00     Pack years: 25.00     Types: Cigarettes     Quit date:      Years since quittin.1    Smokeless tobacco: Never Used   Substance and Sexual Activity    Alcohol use: Yes     Comment: ocassionally    Drug use: Never    Sexual activity: Yes     Partners: Female       Review of Systems   Unable to perform ROS: Other (sedated)   Objective:     Vital Signs (Most Recent):  Temp: 97.6 °F (36.4 °C) (22 0715)  Pulse: 61 (22 1530)  Resp: (!) 21 (22 1530)  BP: 112/64 (22 1530)  SpO2: 100 % (22 1530)   Vital Signs (24h Range):  Temp:  [97.6 °F (36.4 °C)-98.5 °F (36.9 °C)] 97.6 °F (36.4 °C)  Pulse:  [48-75] 61  Resp:  [9-49] 21  SpO2:  [97 %-100 %] 100 %  BP: ()/(52-88) 112/64     Weight: 99.8 kg (220 lb 0.3 oz)  Body mass index is 31.57 kg/m².  Body surface area is 2.22 meters squared.      Intake/Output Summary (Last 24 hours) at 2022 1654  Last data filed at 2022 1501  Gross per 24 hour   Intake 1736.81 ml   Output 6200 ml   Net -4463.19 ml       Physical Exam  Vitals and nursing note reviewed.   Constitutional:       General: He is not in acute distress.     Appearance: He is not toxic-appearing.   HENT:      Head: Normocephalic.      Nose: Nose normal.      Mouth/Throat:      Pharynx: Oropharynx is clear.   Eyes:      General: No scleral icterus.  Cardiovascular:      Rate and Rhythm: Normal rate.   Pulmonary:      Breath sounds: No wheezing or rales.      Comments: Nasal  cannula  Abdominal:      Tenderness: There is no abdominal tenderness.   Musculoskeletal:      Cervical back: Neck supple.      Right lower leg: No edema.      Left lower leg: No edema.   Lymphadenopathy:      Cervical: No cervical adenopathy.   Skin:     General: Skin is warm and dry.   Neurological:      Mental Status: He is alert.      Comments: Sedated    Psychiatric:      Comments: Unable to assess        Significant Labs:   CBC:   Recent Labs   Lab 02/24/22  0223 02/25/22  0503   WBC 11.01 9.18   HGB 9.2* 10.6*   HCT 28.2* 32.9*    193    and CMP:   Recent Labs   Lab 02/24/22  0223 02/24/22  1045 02/25/22  0503    139 141   K 3.2* 3.6 4.5    106 105   CO2 21* 25 26   * 126* 195*   BUN 8 8 9   CREATININE 0.9 1.1 1.2   CALCIUM 7.2* 8.2* 8.8   PROT 5.3*  --  6.6   ALBUMIN 2.3*  --  2.8*   BILITOT 0.5  --  0.7   ALKPHOS 60  --  72   AST 26  --  25   ALT 16  --  18   ANIONGAP 8 8 10   EGFRNONAA >60 >60 >60       Diagnostic Results:  I have reviewed all pertinent imaging results/findings within the past 24 hours.  X-Ray Chest 1 View    Result Date: 2/23/2022  EXAMINATION: XR CHEST 1 VIEW CLINICAL HISTORY: eval ETT/line placement/pna/ptx; TECHNIQUE: Single frontal view of the chest was performed. COMPARISON: 02/22/2022 chest and and CTA chest FINDINGS: Incomplete inspiration, apical lordotic positioning, large body size degrades exam.  Tube support devices stable positioning with right IJ catheter tip remaining in central right atrial chamber.  Cardia margins remain obscured by overlying infiltrate particularly left perihilar and lower lung zone in left cardiac margin.  The moderate sub pulmonic pleural effusion seen better advantage on CT obscures right mid lower lung zone by compression atelectasis.  Pulmonary vascular tree less prominent.  Small left pleural effusion particularly lateral chest wall, atelectasis with corresponding radiating star like infiltrate from left hilar region and  superimposed scarring volume loss left upper lobe     Clearing of pulmonary vascular congestion.  Pleural effusions and corresponding compression atelectasis on right and atelectasis scarring left perihilar and smaller pleural effusion on left stable. Electronically signed by: Cristofer Botello MD Date:    02/23/2022 Time:    10:58    X-Ray Chest 1 View    Result Date: 2/22/2022  EXAMINATION: XR CHEST 1 VIEW CLINICAL HISTORY: picc; TECHNIQUE: Single frontal view of the chest was performed. COMPARISON: 20:04. FINDINGS: Right-sided PICC line is visualized with distal tip in the subclavian region.  Previously visualized left-sided PICC line has been removed.  No significant change in cardiopulmonary status from earlier exam.  ET tube and enteric tube are in similar positions.  No pneumothorax.     As above. Electronically signed by: Abelino Finley MD Date:    02/22/2022 Time:    20:46    X-Ray Chest 1 View    Result Date: 2/22/2022  EXAMINATION: XR CHEST 1 VIEW CLINICAL HISTORY: picc line; TECHNIQUE: Single frontal view of the chest was performed. COMPARISON: 14:21. FINDINGS: ET tube is visualized with distal tip approximately 4 cm above the jonnathan.  Left-sided PICC line is visualized with distal tip projecting horizontally toward the right over the SVC.  No significant change in cardiopulmonary status from earlier exam.  No pneumothorax.     As above. Electronically signed by: Abelino Finley MD Date:    02/22/2022 Time:    20:20    CTA Chest Non-Coronary (PE Study)    Result Date: 2/22/2022  EXAMINATION: CTA CHEST NON CORONARY CLINICAL HISTORY: Pulmonary embolism (PE) suspected, high prob; TECHNIQUE: Low dose axial images, sagittal and coronal reformations were obtained from the thoracic inlet to the lung bases following the IV administration of 100 mL of Omnipaque 350.  Contrast timing was optimized to evaluate the pulmonary arteries.  MIP images were performed. COMPARISON: CT chest abdomen and pelvis from  01/05/2022. FINDINGS: Examination was performed in a STAT inpatient setting is not to serve as official restaging exam. Right-sided chest port is visualized with distal tip in the SVC.  Aorta is non-aneurysmal.  The heart is normal in size without pericardial effusion.  No intraluminal filling defects within the pulmonary arteries to suggest pulmonary thromboembolism.  Enteric tube extends throughout the esophagus into the stomach. ET tube is visualized with distal tip above the jonnathan.  Occlusion is seen of the left upper lobe bronchus.  Mild bronchiectasis is seen within the bilateral lower lobes.  Patient with history of left upper and lower lobe lesions.  Additional associated subsegmental areas of consolidation/atelectasis are seen within the left upper lobe and medial aspect of the left lower lobe.  Moderate right-sided pleural effusion is seen resulting in volume loss and compressive atelectasis within the right lower lobe.  Emphysematous changes are seen with upper lobe predominance. The visualized abdominal structures show no acute abnormalities.  No acute osseous abnormality identified.  Extrathoracic soft tissues are unremarkable.     1. No evidence of PE. 2. Moderate right pleural effusion. 3. Occlusion of the left upper lobe bronchus with known left upper and lower lobe lesions with additional associated subsegmental areas of consolidation/atelectasis within the left upper and lower lobes. 4. Additional findings as detailed above. Electronically signed by: Abelino Finley MD Date:    02/22/2022 Time:    19:13    X-Ray Chest AP Portable    Result Date: 2/23/2022  EXAMINATION: XR CHEST AP PORTABLE February 23, 2022, 23:43 hours CLINICAL HISTORY: ET tube placement; TECHNIQUE: Single frontal view of the chest was performed. COMPARISON: Chest radiograph February 23, 2022, 10:16 hours FINDINGS: ET tube is noted, the distal tip above the level the jonnathan.  Enteric tube is noted, the distal tip extends  subdiaphragmatic below the field of view.  Right-sided central venous catheter again noted.  Additional objects overlie the patient.  There is also a right-sided PICC line again noted. The patient is rotated, when accounting for difference in position, technique and depth of inspiration, the intrathoracic appearance is stable, persistent abnormal radiographic appearance.  There is no evidence for pneumothorax.  The osseous structures demonstrate chronic change.     Stable abnormal radiographic appearance when accounting for difference in position and technique. Electronically signed by: Jordin Sofia Date:    02/23/2022 Time:    23:55    X-Ray Chest AP Portable    Result Date: 2/22/2022  EXAMINATION: XR CHEST AP PORTABLE CLINICAL HISTORY: shortness of breath; TECHNIQUE: Single frontal view of the chest was performed. COMPARISON: Two [facility] nineteen chest x-ray and CT scan chest 01/05/2022 FINDINGS: New bilateral infiltrates with partial consolidation right perihilar mid lower lung zone, obscuring of right lateral hemidiaphragm margin.  New less prominent infiltrates left perihilar mid lung zone as well as retrocardiac infrahilar left lung base with additional pleural reaction left lateral CP angle and adjacent chest wall.  Heart normal size.  Right IJ venous port stable.  Interval insertion of NG tube and endotracheal tube with ET tube tip at T6 level satisfactory above level of jonnathan.  Pulmonary vascular tree appears more more prominent and obscured by adjacent infiltrates.     New bilateral lung infiltrates.  Inflammatory, infectious, CHF etiologies favored.  The possibility of recurrent neoplasm left hilum cannot be excluded. Consideration for follow-up CT chest with IV contrast suggested as well as clinical correlation. Epic abnormal flag. Electronically signed by: Cristofer Botello MD Date:    02/22/2022 Time:    14:59    Echo    Result Date: 2/23/2022  · The left ventricle is normal in size with concentric  hypertrophy and moderately decreased systolic function. · The estimated ejection fraction is 35-40%. · Grade I left ventricular diastolic dysfunction. · Mild right ventricular enlargement with normal right ventricular systolic function. · Mild left atrial enlargement. · Mild right atrial enlargement. · Mild tricuspid regurgitation.      XR Non-Rad Performed NG/Gastric Tube Check    Result Date: 2/23/2022  EXAMINATION: XR NON-RADIOLOGIST PERFORMED NG/GASTRIC TUBE CHECK CLINICAL HISTORY: Ng placement; COMPARISON: 02/22/2022. FINDINGS: Enteric tube extends well below the diaphragm curled within the gastric fundal region.  Nonspecific bowel gas pattern seen.     OG tube in place. Electronically signed by: Abelino Finley MD Date:    02/23/2022 Time:    23:53    X-ray Abdomen for NG Tube Placement (Nursing should notify Radiology after placement)    Result Date: 2/22/2022  EXAMINATION: XR NON-RADIOLOGIST PERFORMED NG/GASTRIC TUBE CHECK CLINICAL HISTORY: OG tube placement; COMPARISON: 14:21. FINDINGS: Enteric tube extends well below the diaphragm into the stomach.  No significant change in cardiopulmonary status from earlier chest radiograph.     OG tube in place. Electronically signed by: Abelino Finley MD Date:    02/22/2022 Time:    18:15       Assessment/Plan:     * Acute respiratory failure with hypoxia and hypercapnia  Reviewed imaging and compared to prior scans.  No obvious evidence of progression with regards to new masses or enlargement of known target lesions.  Moderate right sided pleural effusion ddx malignant effusion vs. Cardiogenic (Echo with reduced EF) vs less likely causes such as parapneumonic.  Extubated 2/25/22.  -discussed with son that when patient stable, next step would be diagnostic thoracentesis. Clarified this is not urgent but is necessary to determine if this is a malignant effusion as this would mean change to his chemotherapy plan.    Lung cancer, main bronchus, left  Currently on  gemcitabine  -follow up with me as outpatient after he is discharged.        Thank you for your consult. I will sign off. Please contact us if you have any additional questions.    Mario Hathaway MD  Hematology/Oncology  [facility] - Intensive Care

## 2022-02-25 NOTE — ASSESSMENT & PLAN NOTE
Reviewed imaging and compared to prior scans.  No obvious evidence of progression with regards to new masses or enlargement of known target lesions.  Moderate right sided pleural effusion ddx malignant effusion vs. Cardiogenic (Echo with reduced EF) vs less likely causes such as parapneumonic.  Extubated 2/25/22.  -discussed with son that when patient stable, next step would be diagnostic thoracentesis. Clarified this is not urgent but is necessary to determine if this is a malignant effusion as this would mean change to his chemotherapy plan.

## 2022-02-25 NOTE — PROGRESS NOTES
[facility] Intensive Care  Pulmonology  Progress Note    Patient Name: Kashif Iverson  MRN: [id number]  Admission Date: 2/22/2022  Hospital Length of Stay: 3 days  Code Status: Full Code  Attending Provider: Abbey Conti MD  Primary Care Provider: Eduardo Ruiz MD   Principal Problem: Acute respiratory failure with hypoxia and hypercapnia    Subjective:     Interval History: No acute issue overnight.  Diuresing well.  Off pressor.      Objective:     Vital Signs (Most Recent):  Temp: 98.5 °F (36.9 °C) (02/25/22 0400)  Pulse: 70 (02/25/22 0838)  Resp: 16 (02/25/22 0755)  BP: 133/78 (02/25/22 0752)  SpO2: 100 % (02/25/22 0838)   Vital Signs (24h Range):  Temp:  [97.7 °F (36.5 °C)-98.5 °F (36.9 °C)] 98.5 °F (36.9 °C)  Pulse:  [41-77] 70  Resp:  [6-49] 16  SpO2:  [100 %] 100 %  BP: ()/(46-88) 133/78     Weight: 99.8 kg (220 lb 0.3 oz)  Body mass index is 31.57 kg/m².      Intake/Output Summary (Last 24 hours) at 2/25/2022 0846  Last data filed at 2/25/2022 0700  Gross per 24 hour   Intake 1864.27 ml   Output 7425 ml   Net -5560.73 ml         Physical Exam  Constitutional:       Appearance: He is well-developed.   HENT:      Head: Normocephalic and atraumatic.   Eyes:      Conjunctiva/sclera: Conjunctivae normal.      Pupils: Pupils are equal, round, and reactive to light.   Cardiovascular:      Rate and Rhythm: Normal rate and regular rhythm.      Heart sounds: Normal heart sounds.   Pulmonary:      Effort: Pulmonary effort is normal.      Breath sounds: Normal breath sounds.   Abdominal:      General: Bowel sounds are normal.      Palpations: Abdomen is soft.   Musculoskeletal:         General: Normal range of motion.      Cervical back: Normal range of motion and neck supple.   Skin:     General: Skin is warm.   Neurological:      Cranial Nerves: No cranial nerve deficit.      Comments: Sedated on vent.       Vents:  Vent Mode: PC (02/25/22 0838)  Ventilator Initiated: Yes (02/23/22 6423)  Set Rate: 10 BPM  (02/25/22 0838)  Vt Set: 450 mL (02/25/22 0752)  Pressure Support: 5 cmH20 (02/23/22 1145)  PEEP/CPAP: 8 cmH20 (02/25/22 0838)  Oxygen Concentration (%): 50 (02/25/22 0838)  Peak Airway Pressure: 13.9 cmH2O (02/25/22 0838)  Total Ve: 9.48 mL (02/25/22 0838)  F/VT Ratio<105 (RSBI): (!) 36.56 (02/25/22 0752)    Lines/Drains/Airways       Peripherally Inserted Central Catheter Line  Duration             PICC Triple Lumen 02/22/22 2020 right brachial 2 days              Central Venous Catheter Line  Duration             Port A Cath Single Lumen right subclavian -- days              Drain  Duration                  Urethral Catheter 02/22/22 1530 Straight-tip 16 Fr. 2 days         NG/OG Tube 02/23/22 2315 Center mouth 1 day              Airway  Duration                  Airway - Non-Surgical 02/23/22 2300 Endotracheal Tube 1 day       Airway Anesthesia 02/23/22 1 day              Peripheral Intravenous Line  Duration                  Peripheral IV - Single Lumen 02/22/22 1400 20 G Anterior;Distal;Left Upper Arm 2 days         Peripheral IV - Single Lumen 02/22/22 1737 22 G Right Upper Arm 2 days                    Significant Labs:    CBC/Anemia Profile:  Recent Labs   Lab 02/24/22  0223 02/25/22  0503   WBC 11.01 9.18   HGB 9.2* 10.6*   HCT 28.2* 32.9*    193   MCV 99* 97   RDW 16.6* 16.8*          Chemistries:  Recent Labs   Lab 02/23/22  1258 02/24/22  0223 02/24/22  1045 02/25/22  0503    138 139 141   K 3.4* 3.2* 3.6 4.5    109 106 105   CO2 21* 21* 25 26   BUN 10 8 8 9   CREATININE 1.0 0.9 1.1 1.2   CALCIUM 8.2* 7.2* 8.2* 8.8   ALBUMIN 2.6* 2.3*  --  2.8*   PROT 6.0 5.3*  --  6.6   BILITOT 0.8 0.5  --  0.7   ALKPHOS 67 60  --  72   ALT 18 16  --  18   AST 29 26  --  25   MG  --   --   --  1.7   PHOS  --  4.2  --  5.0*         ABGs:   Recent Labs   Lab 02/23/22  0933   PH 7.433   PCO2 31.5*   HCO3 21.0*   POCSATURATED 98   BE -3       Echo 2/23/22  Echo 2/23/22  · The left ventricle is normal in  size with concentric hypertrophy and moderately decreased systolic function.  · The estimated ejection fraction is 35-40%.  · Grade I left ventricular diastolic dysfunction.  · Mild right ventricular enlargement with normal right ventricular systolic function.  · Mild left atrial enlargement.  · Mild right atrial enlargement.  · Mild tricuspid regurgitation.    Significant Imaging:  I have reviewed all pertinent imaging results/findings within the past 24 hours.  CXR: I have reviewed all pertinent results/findings within the past 24 hours and my personal findings are:  no acute change ; persistent stranding pattern on the left lung.        ABG  Recent Labs   Lab 02/23/22  0933   PH 7.433   PO2 107*   PCO2 31.5*   HCO3 21.0*   BE -3     Assessment/Plan:     * Acute respiratory failure with hypoxia and hypercapnia  -intubated due to agitation and hypoxemia  -cxr without detrimental changes when compared to prior cxr.  CT with georges and lll atelectasis from underlying lung disease.  Overall, left lung aeration is better when compared to 2019 imaging.  -new right effusion.  Suspect secondary to volume overload with HFrEF  -will hold ivf and start a trial of lasix.    -mildly elevated procal.  Currently on vanco/zosyn.  Unable to collect sputum.  recommend 5 days course.    -extubated on 2/23/22 after sbt.  Doing well on nasal canula for about 10 hours prior to reintubation.  ?flash pulmonary edema in spiked in BNP after intubation  -back on minimal vent support in term of oxygenation.    -check cardiac enzymes.  -will start empiric steroid due smoking history along with respiratory decompensation after extubation  -will place patient on t-piece and possible extubation today.      Cardiogenic shock   Currently on low dose levophed.  Will wean as tolerated.      Acute systolic heart failure  -NSTEM with EF of 35%.  -asa and plavix.    -no plan for LHC per cards.      Palliative care encounter  -full code per Dr. Disla  -h/o  metastatic malignancy undergoing palliative chemo with Dr. Gonsalez  - palliative care inputs appreciated.  -spoken with wife today 2/24/22 and all questions answered.      High anion gap metabolic acidosis  -initial AG was 17 due to lactic acidosis.  AG has normalized.      NSTEMI (non-ST elevated myocardial infarction)  -see heart failure    Lung cancer, main bronchus, left  -Metastatic squamous cell carcinoma of lung diagnosed in 2019.  S/p palliative chemo with oncology.  Currently followed by Dr. Hathaway.    -repeat imaging from current hospitalization showed improve aeration when compared to 2019 imaging.      Former smoker  -per wife, smoking 2 ppd x 40 years  -suspect underlying copd based on delayed exhalation phase on vent.  -outpatient pft is reasonable.           Vivek Adair MD  Pulmonology  [facility] - [place] - Intensive Care    Critical Care Time: 45  minutes  Critical secondary to respiratory failure     Critical care was time spent personally by me on the following activities: development of treatment plan with patient or surrogate and bedside caregivers, discussions with consultants, evaluation of patient's response to treatment, examination of patient, ordering and performing treatments and interventions, ordering and review of laboratory studies, ordering and review of radiographic studies, pulse oximetry, re-evaluation of patient's condition.  This critical care time did not overlap with that of any other provider or involve time for any procedures.

## 2022-02-25 NOTE — SUBJECTIVE & OBJECTIVE
Interval History: working on SAT    Review of Systems   Unable to perform ROS: Intubated   Objective:     Vital Signs (Most Recent):  Temp: 97.6 °F (36.4 °C) (02/25/22 0715)  Pulse: (!) 59 (02/25/22 1015)  Resp: 14 (02/25/22 1015)  BP: 124/70 (02/25/22 1015)  SpO2: 100 % (02/25/22 1015)   Vital Signs (24h Range):  Temp:  [97.6 °F (36.4 °C)-98.5 °F (36.9 °C)] 97.6 °F (36.4 °C)  Pulse:  [41-77] 59  Resp:  [9-49] 14  SpO2:  [97 %-100 %] 100 %  BP: ()/(55-88) 124/70     Weight: 99.8 kg (220 lb 0.3 oz)  Body mass index is 31.57 kg/m².    Intake/Output Summary (Last 24 hours) at 2/25/2022 1043  Last data filed at 2/25/2022 1000  Gross per 24 hour   Intake 1803.93 ml   Output 6750 ml   Net -4946.07 ml        Physical Exam  Vitals and nursing note reviewed.   Constitutional:       General: He is not in acute distress.     Appearance: He is not toxic-appearing.   HENT:      Nose:      Comments: No secretions in ETT or OG  Pulmonary:      Breath sounds: No wheezing or rales.      Comments: Mechanical ventilation   Neurological:      Mental Status: He is alert.      Comments: Sedated    Psychiatric:      Comments: Unable to assess        Significant Labs: All pertinent labs within the past 24 hours have been reviewed.    Significant Imaging: I have reviewed all pertinent imaging results/findings within the past 24 hours.  I have reviewed and interpreted all pertinent imaging results/findings within the past 24 hours.

## 2022-02-26 LAB
ALBUMIN SERPL BCP-MCNC: 2.9 G/DL (ref 3.5–5.2)
ALP SERPL-CCNC: 63 U/L (ref 55–135)
ALT SERPL W/O P-5'-P-CCNC: 18 U/L (ref 10–44)
ANION GAP SERPL CALC-SCNC: 10 MMOL/L (ref 8–16)
AST SERPL-CCNC: 21 U/L (ref 10–40)
BACTERIA BLD CULT: NORMAL
BACTERIA BLD CULT: NORMAL
BACTERIA SPEC AEROBE CULT: NO GROWTH
BASOPHILS # BLD AUTO: 0 K/UL (ref 0–0.2)
BASOPHILS NFR BLD: 0 % (ref 0–1.9)
BILIRUB SERPL-MCNC: 0.7 MG/DL (ref 0.1–1)
BUN SERPL-MCNC: 12 MG/DL (ref 8–23)
CALCIUM SERPL-MCNC: 8.8 MG/DL (ref 8.7–10.5)
CHLORIDE SERPL-SCNC: 102 MMOL/L (ref 95–110)
CO2 SERPL-SCNC: 32 MMOL/L (ref 23–29)
CREAT SERPL-MCNC: 1.1 MG/DL (ref 0.5–1.4)
DIFFERENTIAL METHOD: ABNORMAL
EOSINOPHIL # BLD AUTO: 0 K/UL (ref 0–0.5)
EOSINOPHIL NFR BLD: 0 % (ref 0–8)
ERYTHROCYTE [DISTWIDTH] IN BLOOD BY AUTOMATED COUNT: 16.6 % (ref 11.5–14.5)
EST. GFR  (AFRICAN AMERICAN): >60 ML/MIN/1.73 M^2
EST. GFR  (NON AFRICAN AMERICAN): >60 ML/MIN/1.73 M^2
GLUCOSE SERPL-MCNC: 137 MG/DL (ref 70–110)
GRAM STN SPEC: NORMAL
HCT VFR BLD AUTO: 32.1 % (ref 40–54)
HGB BLD-MCNC: 10.6 G/DL (ref 14–18)
IMM GRANULOCYTES # BLD AUTO: 0.05 K/UL (ref 0–0.04)
IMM GRANULOCYTES NFR BLD AUTO: 0.5 % (ref 0–0.5)
LYMPHOCYTES # BLD AUTO: 1.2 K/UL (ref 1–4.8)
LYMPHOCYTES NFR BLD: 12.6 % (ref 18–48)
MCH RBC QN AUTO: 32.3 PG (ref 27–31)
MCHC RBC AUTO-ENTMCNC: 33 G/DL (ref 32–36)
MCV RBC AUTO: 98 FL (ref 82–98)
MONOCYTES # BLD AUTO: 1.2 K/UL (ref 0.3–1)
MONOCYTES NFR BLD: 13.1 % (ref 4–15)
NEUTROPHILS # BLD AUTO: 7 K/UL (ref 1.8–7.7)
NEUTROPHILS NFR BLD: 73.8 % (ref 38–73)
NRBC BLD-RTO: 0 /100 WBC
PHOSPHATE SERPL-MCNC: 4.1 MG/DL (ref 2.7–4.5)
PLATELET # BLD AUTO: 194 K/UL (ref 150–450)
PMV BLD AUTO: 10.7 FL (ref 9.2–12.9)
POCT GLUCOSE: 117 MG/DL (ref 70–110)
POCT GLUCOSE: 119 MG/DL (ref 70–110)
POCT GLUCOSE: 138 MG/DL (ref 70–110)
POCT GLUCOSE: 174 MG/DL (ref 70–110)
POTASSIUM SERPL-SCNC: 3.6 MMOL/L (ref 3.5–5.1)
PROT SERPL-MCNC: 6.7 G/DL (ref 6–8.4)
RBC # BLD AUTO: 3.28 M/UL (ref 4.6–6.2)
SODIUM SERPL-SCNC: 144 MMOL/L (ref 136–145)
WBC # BLD AUTO: 9.43 K/UL (ref 3.9–12.7)

## 2022-02-26 PROCEDURE — 84100 ASSAY OF PHOSPHORUS: CPT | Performed by: INTERNAL MEDICINE

## 2022-02-26 PROCEDURE — 99233 PR SUBSEQUENT HOSPITAL CARE,LEVL III: ICD-10-PCS | Mod: ,,, | Performed by: INTERNAL MEDICINE

## 2022-02-26 PROCEDURE — 85025 COMPLETE CBC W/AUTO DIFF WBC: CPT | Performed by: INTERNAL MEDICINE

## 2022-02-26 PROCEDURE — 99233 SBSQ HOSP IP/OBS HIGH 50: CPT | Mod: ,,, | Performed by: INTERNAL MEDICINE

## 2022-02-26 PROCEDURE — 25000003 PHARM REV CODE 250: Performed by: INTERNAL MEDICINE

## 2022-02-26 PROCEDURE — 99291 PR CRITICAL CARE, E/M 30-74 MINUTES: ICD-10-PCS | Mod: ,,, | Performed by: INTERNAL MEDICINE

## 2022-02-26 PROCEDURE — 99291 CRITICAL CARE FIRST HOUR: CPT | Mod: ,,, | Performed by: INTERNAL MEDICINE

## 2022-02-26 PROCEDURE — A4216 STERILE WATER/SALINE, 10 ML: HCPCS | Performed by: INTERNAL MEDICINE

## 2022-02-26 PROCEDURE — 94640 AIRWAY INHALATION TREATMENT: CPT

## 2022-02-26 PROCEDURE — 80053 COMPREHEN METABOLIC PANEL: CPT | Performed by: INTERNAL MEDICINE

## 2022-02-26 PROCEDURE — 99900035 HC TECH TIME PER 15 MIN (STAT)

## 2022-02-26 PROCEDURE — 94760 N-INVAS EAR/PLS OXIMETRY 1: CPT

## 2022-02-26 PROCEDURE — 25000003 PHARM REV CODE 250: Performed by: STUDENT IN AN ORGANIZED HEALTH CARE EDUCATION/TRAINING PROGRAM

## 2022-02-26 PROCEDURE — 63600175 PHARM REV CODE 636 W HCPCS: Performed by: INTERNAL MEDICINE

## 2022-02-26 PROCEDURE — 20000000 HC ICU ROOM

## 2022-02-26 PROCEDURE — 25000242 PHARM REV CODE 250 ALT 637 W/ HCPCS: Performed by: INTERNAL MEDICINE

## 2022-02-26 RX ORDER — OLANZAPINE 5 MG/1
5 TABLET, ORALLY DISINTEGRATING ORAL EVERY 8 HOURS PRN
Status: DISCONTINUED | OUTPATIENT
Start: 2022-02-26 | End: 2022-03-05 | Stop reason: HOSPADM

## 2022-02-26 RX ADMIN — MUPIROCIN: 20 OINTMENT TOPICAL at 08:02

## 2022-02-26 RX ADMIN — ATORVASTATIN CALCIUM 80 MG: 40 TABLET, FILM COATED ORAL at 08:02

## 2022-02-26 RX ADMIN — FUROSEMIDE 60 MG: 10 INJECTION, SOLUTION INTRAMUSCULAR; INTRAVENOUS at 09:02

## 2022-02-26 RX ADMIN — Medication 10 ML: at 06:02

## 2022-02-26 RX ADMIN — IPRATROPIUM BROMIDE AND ALBUTEROL SULFATE 3 ML: 2.5; .5 SOLUTION RESPIRATORY (INHALATION) at 07:02

## 2022-02-26 RX ADMIN — FUROSEMIDE 60 MG: 10 INJECTION, SOLUTION INTRAMUSCULAR; INTRAVENOUS at 06:02

## 2022-02-26 RX ADMIN — FUROSEMIDE 60 MG: 10 INJECTION, SOLUTION INTRAMUSCULAR; INTRAVENOUS at 01:02

## 2022-02-26 RX ADMIN — DEXMEDETOMIDINE HYDROCHLORIDE 0.5 MCG/KG/HR: 400 INJECTION, SOLUTION INTRAVENOUS at 02:02

## 2022-02-26 RX ADMIN — FAMOTIDINE 20 MG: 10 INJECTION INTRAVENOUS at 09:02

## 2022-02-26 RX ADMIN — ENOXAPARIN SODIUM 40 MG: 40 INJECTION SUBCUTANEOUS at 05:02

## 2022-02-26 RX ADMIN — Medication 10 ML: at 12:02

## 2022-02-26 RX ADMIN — FAMOTIDINE 20 MG: 10 INJECTION INTRAVENOUS at 08:02

## 2022-02-26 RX ADMIN — CLOPIDOGREL 75 MG: 75 TABLET, FILM COATED ORAL at 08:02

## 2022-02-26 RX ADMIN — ASPIRIN 81 MG CHEWABLE TABLET 81 MG: 81 TABLET CHEWABLE at 08:02

## 2022-02-26 RX ADMIN — PREDNISONE 50 MG: 50 TABLET ORAL at 08:02

## 2022-02-26 RX ADMIN — CHLORHEXIDINE GLUCONATE 0.12% ORAL RINSE 15 ML: 1.2 LIQUID ORAL at 08:02

## 2022-02-26 RX ADMIN — MUPIROCIN: 20 OINTMENT TOPICAL at 09:02

## 2022-02-26 NOTE — PROGRESS NOTES
[facility] Intensive Care  Cardiology  Progress Note    Patient Name: Kashif Iverson  MRN: [id number]  Admission Date: 2/22/2022  Hospital Length of Stay: 4 days  Code Status: Full Code   Attending Physician: Abbey Conti MD   Primary Care Physician: Eduardo Ruiz MD  Expected Discharge Date:   Principal Problem:Acute respiratory failure with hypoxia and hypercapnia    Subjective:           Interval History:  Extubated.  Still somnolent.  Denies chest pain.    Review of Systems   Constitutional: Negative for diaphoresis.   Eyes:  Negative for double vision.   Cardiovascular:  Negative for chest pain.   Respiratory:  Negative for shortness of breath.    Objective:     Vital Signs (Most Recent):  Temp: (!) 95.8 °F (35.4 °C) (02/26/22 0800)  Pulse: (!) 53 (02/26/22 1130)  Resp: (!) 24 (02/26/22 1130)  BP: 112/78 (02/26/22 1130)  SpO2: 100 % (02/26/22 1130)   Vital Signs (24h Range):  Temp:  [95.8 °F (35.4 °C)-98.6 °F (37 °C)] 95.8 °F (35.4 °C)  Pulse:  [] 53  Resp:  [10-58] 24  SpO2:  [95 %-100 %] 100 %  BP: ()/(52-92) 112/78     Weight: 99.8 kg (220 lb 0.3 oz)  Body mass index is 31.57 kg/m².     SpO2: 100 %  O2 Device (Oxygen Therapy): nasal cannula      Intake/Output Summary (Last 24 hours) at 2/26/2022 1235  Last data filed at 2/26/2022 0900  Gross per 24 hour   Intake 344.12 ml   Output 5210 ml   Net -4865.88 ml       Lines/Drains/Airways       Peripherally Inserted Central Catheter Line  Duration             PICC Triple Lumen 02/22/22 2020 right brachial 3 days              Central Venous Catheter Line  Duration             Port A Cath Single Lumen right subclavian -- days              Drain  Duration                  Urethral Catheter 02/22/22 1530 Straight-tip 16 Fr. 3 days              Peripheral Intravenous Line  Duration                  Peripheral IV - Single Lumen 02/22/22 1400 20 G Anterior;Distal;Left Upper Arm 3 days         Peripheral IV - Single Lumen 02/22/22 1737 22 G Right Upper Arm 3  days                    Physical Exam  Vitals reviewed.   Constitutional:       Appearance: He is well-developed.   HENT:      Head: Normocephalic.   Eyes:      Conjunctiva/sclera: Conjunctivae normal.      Pupils: Pupils are equal, round, and reactive to light.   Cardiovascular:      Rate and Rhythm: Regular rhythm. Bradycardia present.      Heart sounds: Normal heart sounds.   Pulmonary:      Effort: Pulmonary effort is normal.      Breath sounds: Normal breath sounds.   Abdominal:      General: Bowel sounds are normal.      Palpations: Abdomen is soft.   Musculoskeletal:      Cervical back: Normal range of motion and neck supple.   Skin:     General: Skin is warm.   Neurological:      Mental Status: He is alert and oriented to person, place, and time.       Significant Labs: BMP:   Recent Labs   Lab 02/25/22  0503 02/26/22  0542   * 137*    144   K 4.5 3.6    102   CO2 26 32*   BUN 9 12   CREATININE 1.2 1.1   CALCIUM 8.8 8.8   MG 1.7  --    , CMP   Recent Labs   Lab 02/25/22  0503 02/26/22  0542    144   K 4.5 3.6    102   CO2 26 32*   * 137*   BUN 9 12   CREATININE 1.2 1.1   CALCIUM 8.8 8.8   PROT 6.6 6.7   ALBUMIN 2.8* 2.9*   BILITOT 0.7 0.7   ALKPHOS 72 63   AST 25 21   ALT 18 18   ANIONGAP 10 10   ESTGFRAFRICA >60 >60   EGFRNONAA >60 >60   , CBC   Recent Labs   Lab 02/25/22  0503 02/26/22  0542   WBC 9.18 9.43   HGB 10.6* 10.6*   HCT 32.9* 32.1*    194   , INR No results for input(s): INR, PROTIME in the last 48 hours., Lipid Panel No results for input(s): CHOL, HDL, LDLCALC, TRIG, CHOLHDL in the last 48 hours., Troponin No results for input(s): TROPONINI in the last 48 hours., and All pertinent lab results from the last 24 hours have been reviewed.    Significant Imaging: Echocardiogram: Transthoracic echo (TTE) complete (Cupid Only):   Results for orders placed or performed during the hospital encounter of 02/22/22   Echo   Result Value Ref Range    BSA 2.29 m2     TDI SEPTAL 0.04 m/s    LV LATERAL E/E' RATIO 7.29 m/s    LV SEPTAL E/E' RATIO 12.75 m/s    LA WIDTH 4.99 cm    AORTIC VALVE CUSP SEPERATION 2.31 cm    TDI LATERAL 0.07 m/s    PV PEAK VELOCITY 0.75 cm/s    LVIDd 4.49 3.5 - 6.0 cm    IVS 1.33 (A) 0.6 - 1.1 cm    Posterior Wall 1.34 (A) 0.6 - 1.1 cm    Ao root annulus 3.92 cm    LVIDs 3.80 2.1 - 4.0 cm    FS 15 28 - 44 %    LA volume 111.72 cm3    Sinus 3.83 cm    STJ 2.92 cm    Ascending aorta 3.08 cm    LV mass 230.68 g    LA size 4.42 cm    RVDD 4.46 cm    TAPSE 1.56 cm    RV S' 9.12 cm/s    Left Ventricle Relative Wall Thickness 0.60 cm    AV mean gradient 3 mmHg    AV valve area 2.95 cm2    AV Velocity Ratio 0.63     AV index (prosthetic) 0.66     MV valve area p 1/2 method 3.41 cm2    E/A ratio 1.21     Mean e' 0.06 m/s    E wave deceleration time 222.66 msec    IVRT 283.74 msec    Pulm vein S/D ratio 1.04     LVOT diameter 2.39 cm    LVOT area 4.5 cm2    LVOT peak luigi 0.70 m/s    LVOT peak VTI 13.85 cm    Ao peak luigi 1.11 m/s    Ao VTI 21.02 cm    LVOT stroke volume 62.10 cm3    AV peak gradient 5 mmHg    E/E' ratio 9.27 m/s    MV Peak E Luigi 0.51 m/s    TR Max Luigi 2.33 m/s    MV stenosis pressure 1/2 time 64.57 ms    MV Peak A Luigi 0.42 m/s    PV Peak S Luigi 0.29 m/s    PV Peak D Luigi 0.28 m/s    LV Systolic Volume 61.96 mL    LV Systolic Volume Index 28.6 mL/m2    LV Diastolic Volume 92.20 mL    LV Diastolic Volume Index 42.49 mL/m2    LA Volume Index 51.5 mL/m2    LV Mass Index 106 g/m2    RA Major Axis 5.73 cm    Left Atrium Minor Axis 6.03 cm    Left Atrium Major Axis 5.89 cm    Triscuspid Valve Regurgitation Peak Gradient 22 mmHg    RA Width 4.32 cm    EF 35 %    Narrative    · The left ventricle is normal in size with concentric hypertrophy and   moderately decreased systolic function.  · The estimated ejection fraction is 35-40%.  · Grade I left ventricular diastolic dysfunction.  · Mild right ventricular enlargement with normal right ventricular    systolic function.  · Mild left atrial enlargement.  · Mild right atrial enlargement.  · Mild tricuspid regurgitation.        Assessment and Plan:     Brief HPI:     * Acute respiratory failure with hypoxia and hypercapnia  Extubated today.    Multifocal atrial tachycardia  New Dx. Related to underlying pulmonary issues. Now resolved    High anion gap metabolic acidosis  Being treated for possible DKA    NSTEMI (non-ST elevated myocardial infarction)  Peak troponin 1.7. Suspect demand ischemia from tachycardia, respiratory distress and acidosis. Echo with EF 35-40% ? Tachymyopathy. Will discuss ischemic evaluation when more stable        Lung cancer, main bronchus, left  Per primary        VTE Risk Mitigation (From admission, onward)         Ordered     enoxaparin injection 40 mg  Daily         02/24/22 2695                Catia Roman MD  Cardiology  [facility] - Intensive Care    Critical Care Time: 35 minutes     Critical care was time spent personally by me on the following activities: development of treatment plan with patient or surrogate and bedside caregivers, discussions with consultants, evaluation of patient's response to treatment, examination of patient, ordering and performing treatments and interventions, ordering and review of laboratory studies, ordering and review of radiographic studies, pulse oximetry, re-evaluation of patient's condition. This critical care time did not overlap with that of any other provider or involve time for any procedures.

## 2022-02-26 NOTE — PROGRESS NOTES
[facility]  Pulmonology  Progress Note    Patient Name: Kashif Iverson  MRN: [id number]  Admission Date: 2/22/2022  Hospital Length of Stay: 4 days  Code Status: Full Code  Attending Provider: Abbey Conti MD  Primary Care Provider: Eduardo Ruiz MD   Principal Problem: Acute respiratory failure with hypoxia and hypercapnia    Subjective:     Interval History: 2/26 =>  On BiPAP overnight following extubation around 13:00 yesterday.  Now on nasal cannula with comfortable breathing and no desaturation.    Objective:     Vital Signs (Most Recent):  Temp: 98 °F (36.7 °C) (02/26/22 0400)  Pulse: (!) 55 (02/26/22 0830)  Resp: 15 (02/26/22 0830)  BP: 103/63 (02/26/22 0830)  SpO2: 100 % (02/26/22 0830)   Vital Signs (24h Range):  Temp:  [98 °F (36.7 °C)-98.6 °F (37 °C)] 98 °F (36.7 °C)  Pulse:  [] 55  Resp:  [10-58] 15  SpO2:  [95 %-100 %] 100 %  BP: ()/(52-92) 103/63     Weight: 99.8 kg (220 lb 0.3 oz)  Body mass index is 31.57 kg/m².      Intake/Output Summary (Last 24 hours) at 2/26/2022 1054  Last data filed at 2/26/2022 0800  Gross per 24 hour   Intake 409.15 ml   Output 5210 ml   Net -4800.85 ml       Physical Exam  Vitals and nursing note reviewed.   Constitutional:       General: He is not in acute distress.     Appearance: He is not toxic-appearing.      Comments: Sleeping comfortably; answers questions appropriately upon awakening.   Cardiovascular:      Rate and Rhythm: Normal rate and regular rhythm.      Heart sounds: Normal heart sounds. No murmur heard.    No gallop.   Pulmonary:      Effort: Pulmonary effort is normal. No respiratory distress.      Breath sounds: Normal breath sounds. No stridor. No wheezing or rales.   Abdominal:      General: Bowel sounds are normal. There is no distension.      Palpations: Abdomen is soft.      Tenderness: There is no abdominal tenderness. There is no rebound.   Neurological:      General: No focal deficit present.      Mental Status: Mental  status is at baseline.       Vents:  Nasal cannula oxygen      Lines/Drains/Airways       Peripherally Inserted Central Catheter Line  Duration             PICC Triple Lumen 02/22/22 2020 right brachial 3 days              Central Venous Catheter Line  Duration             Port A Cath Single Lumen right subclavian -- days              Drain  Duration                  Urethral Catheter 02/22/22 1530 Straight-tip 16 Fr. 3 days              Peripheral Intravenous Line  Duration                  Peripheral IV - Single Lumen 02/22/22 1400 20 G Anterior;Distal;Left Upper Arm 3 days         Peripheral IV - Single Lumen 02/22/22 1737 22 G Right Upper Arm 3 days                    Significant Labs:    CBC/Anemia Profile:  Recent Labs   Lab 02/25/22  0503 02/26/22  0542   WBC 9.18 9.43   HGB 10.6* 10.6*   HCT 32.9* 32.1*    194   MCV 97 98   RDW 16.8* 16.6*        Chemistries:  Recent Labs   Lab 02/25/22  0503 02/26/22  0542    144   K 4.5 3.6    102   CO2 26 32*   BUN 9 12   CREATININE 1.2 1.1   CALCIUM 8.8 8.8   ALBUMIN 2.8* 2.9*   PROT 6.6 6.7   BILITOT 0.7 0.7   ALKPHOS 72 63   ALT 18 18   AST 25 21   MG 1.7  --    PHOS 5.0* 4.1       All pertinent labs within the past 24 hours have been reviewed.    Significant Imaging:  I have reviewed all pertinent imaging results/findings within the past 24 hours.  No new imaging but modest right pleural effusion present on admission.      ABG  Recent Labs   Lab 02/25/22  1205   PH 7.445   PO2 77*   PCO2 42.4   HCO3 29.1*   BE 5     Assessment/Plan:     * Acute respiratory failure with hypoxia and hypercapnia  -intubated due to agitation and hypoxemia  -cxr without detrimental changes when compared to prior cxr.  CT with georges and lll atelectasis from underlying lung disease.  Overall, left lung aeration is better when compared to 2019 imaging.  -new right effusion.  Suspect secondary to volume overload with HFrEF  -will hold ivf and start a trial of lasix.    -mildly  elevated procal.  Currently on vanco/zosyn.  Unable to collect sputum.  recommend 5 days course.    -extubated on 2/23/22 after sbt.  Doing well on nasal canula for about 10 hours prior to reintubation.  ?flash pulmonary edema in spiked in BNP after intubation  -back on minimal vent support in term of oxygenation.    -check cardiac enzymes.  -will start empiric steroid due smoking history along with respiratory decompensation after extubation  -will place patient on t-piece and possible extubation 2/25.    2/26 => Extubated for 22 hours with BiPAP overnight.  Now on nasal cannula with no desaturation or stridor noted.  Should be able to discontinue BiPAP altogether and wean/off steroids.  Likely multi-factorial contributions including COPD, pneumonia, and heart failure.  Would plan on repeat CXR and possible ultrasound-guided right thoracentesis prior to hospital discharge to determine whether effusion is secondary to CHF or possible metastatic malignancy that would warrant a change in the oncologic treatment plan.    Lung cancer, main bronchus, left  -Metastatic squamous cell carcinoma of lung diagnosed in 2019.  S/p palliative chemo with oncology.  Currently followed by Dr. Hathaway.    -repeat imaging from current hospitalization showed improve aeration when compared to 2019 imaging.      2/26 => Consider right thoracentesis if still present prior to discharge.  Metastatic effusion to contralateral lung without parenchymal changes is uncommon.    Former smoker  -per wife, smoking 2 ppd x 40 years  -suspect underlying copd based on delayed exhalation phase on vent.  -outpatient pft is reasonable.        Pulmonary to reassess in 48 hours whether thoracentesis should be pursued while inpatient.       Luis Enrique Dejesus MD  Pulmonology  [facility] - Intensive Care

## 2022-02-26 NOTE — SUBJECTIVE & OBJECTIVE
Interval History:  Extubated.  Still somnolent.  Denies chest pain.    Review of Systems   Constitutional: Negative for diaphoresis.   Eyes:  Negative for double vision.   Cardiovascular:  Negative for chest pain.   Respiratory:  Negative for shortness of breath.    Objective:     Vital Signs (Most Recent):  Temp: (!) 95.8 °F (35.4 °C) (02/26/22 0800)  Pulse: (!) 53 (02/26/22 1130)  Resp: (!) 24 (02/26/22 1130)  BP: 112/78 (02/26/22 1130)  SpO2: 100 % (02/26/22 1130)   Vital Signs (24h Range):  Temp:  [95.8 °F (35.4 °C)-98.6 °F (37 °C)] 95.8 °F (35.4 °C)  Pulse:  [] 53  Resp:  [10-58] 24  SpO2:  [95 %-100 %] 100 %  BP: ()/(52-92) 112/78     Weight: 99.8 kg (220 lb 0.3 oz)  Body mass index is 31.57 kg/m².     SpO2: 100 %  O2 Device (Oxygen Therapy): nasal cannula      Intake/Output Summary (Last 24 hours) at 2/26/2022 1235  Last data filed at 2/26/2022 0900  Gross per 24 hour   Intake 344.12 ml   Output 5210 ml   Net -4865.88 ml       Lines/Drains/Airways       Peripherally Inserted Central Catheter Line  Duration             PICC Triple Lumen 02/22/22 2020 right brachial 3 days              Central Venous Catheter Line  Duration             Port A Cath Single Lumen right subclavian -- days              Drain  Duration                  Urethral Catheter 02/22/22 1530 Straight-tip 16 Fr. 3 days              Peripheral Intravenous Line  Duration                  Peripheral IV - Single Lumen 02/22/22 1400 20 G Anterior;Distal;Left Upper Arm 3 days         Peripheral IV - Single Lumen 02/22/22 1737 22 G Right Upper Arm 3 days                    Physical Exam  Vitals reviewed.   Constitutional:       Appearance: He is well-developed.   HENT:      Head: Normocephalic.   Eyes:      Conjunctiva/sclera: Conjunctivae normal.      Pupils: Pupils are equal, round, and reactive to light.   Cardiovascular:      Rate and Rhythm: Regular rhythm. Bradycardia present.      Heart sounds: Normal heart sounds.   Pulmonary:       Effort: Pulmonary effort is normal.      Breath sounds: Normal breath sounds.   Abdominal:      General: Bowel sounds are normal.      Palpations: Abdomen is soft.   Musculoskeletal:      Cervical back: Normal range of motion and neck supple.   Skin:     General: Skin is warm.   Neurological:      Mental Status: He is alert and oriented to person, place, and time.       Significant Labs: BMP:   Recent Labs   Lab 02/25/22  0503 02/26/22  0542   * 137*    144   K 4.5 3.6    102   CO2 26 32*   BUN 9 12   CREATININE 1.2 1.1   CALCIUM 8.8 8.8   MG 1.7  --    , CMP   Recent Labs   Lab 02/25/22  0503 02/26/22  0542    144   K 4.5 3.6    102   CO2 26 32*   * 137*   BUN 9 12   CREATININE 1.2 1.1   CALCIUM 8.8 8.8   PROT 6.6 6.7   ALBUMIN 2.8* 2.9*   BILITOT 0.7 0.7   ALKPHOS 72 63   AST 25 21   ALT 18 18   ANIONGAP 10 10   ESTGFRAFRICA >60 >60   EGFRNONAA >60 >60   , CBC   Recent Labs   Lab 02/25/22  0503 02/26/22  0542   WBC 9.18 9.43   HGB 10.6* 10.6*   HCT 32.9* 32.1*    194   , INR No results for input(s): INR, PROTIME in the last 48 hours., Lipid Panel No results for input(s): CHOL, HDL, LDLCALC, TRIG, CHOLHDL in the last 48 hours., Troponin No results for input(s): TROPONINI in the last 48 hours., and All pertinent lab results from the last 24 hours have been reviewed.    Significant Imaging: Echocardiogram: Transthoracic echo (TTE) complete (Cupid Only):   Results for orders placed or performed during the hospital encounter of 02/22/22   Echo   Result Value Ref Range    BSA 2.29 m2    TDI SEPTAL 0.04 m/s    LV LATERAL E/E' RATIO 7.29 m/s    LV SEPTAL E/E' RATIO 12.75 m/s    LA WIDTH 4.99 cm    AORTIC VALVE CUSP SEPERATION 2.31 cm    TDI LATERAL 0.07 m/s    PV PEAK VELOCITY 0.75 cm/s    LVIDd 4.49 3.5 - 6.0 cm    IVS 1.33 (A) 0.6 - 1.1 cm    Posterior Wall 1.34 (A) 0.6 - 1.1 cm    Ao root annulus 3.92 cm    LVIDs 3.80 2.1 - 4.0 cm    FS 15 28 - 44 %    LA volume 111.72 cm3     Sinus 3.83 cm    STJ 2.92 cm    Ascending aorta 3.08 cm    LV mass 230.68 g    LA size 4.42 cm    RVDD 4.46 cm    TAPSE 1.56 cm    RV S' 9.12 cm/s    Left Ventricle Relative Wall Thickness 0.60 cm    AV mean gradient 3 mmHg    AV valve area 2.95 cm2    AV Velocity Ratio 0.63     AV index (prosthetic) 0.66     MV valve area p 1/2 method 3.41 cm2    E/A ratio 1.21     Mean e' 0.06 m/s    E wave deceleration time 222.66 msec    IVRT 283.74 msec    Pulm vein S/D ratio 1.04     LVOT diameter 2.39 cm    LVOT area 4.5 cm2    LVOT peak luigi 0.70 m/s    LVOT peak VTI 13.85 cm    Ao peak luigi 1.11 m/s    Ao VTI 21.02 cm    LVOT stroke volume 62.10 cm3    AV peak gradient 5 mmHg    E/E' ratio 9.27 m/s    MV Peak E Luigi 0.51 m/s    TR Max Luigi 2.33 m/s    MV stenosis pressure 1/2 time 64.57 ms    MV Peak A Luigi 0.42 m/s    PV Peak S Luigi 0.29 m/s    PV Peak D Luigi 0.28 m/s    LV Systolic Volume 61.96 mL    LV Systolic Volume Index 28.6 mL/m2    LV Diastolic Volume 92.20 mL    LV Diastolic Volume Index 42.49 mL/m2    LA Volume Index 51.5 mL/m2    LV Mass Index 106 g/m2    RA Major Axis 5.73 cm    Left Atrium Minor Axis 6.03 cm    Left Atrium Major Axis 5.89 cm    Triscuspid Valve Regurgitation Peak Gradient 22 mmHg    RA Width 4.32 cm    EF 35 %    Narrative    · The left ventricle is normal in size with concentric hypertrophy and   moderately decreased systolic function.  · The estimated ejection fraction is 35-40%.  · Grade I left ventricular diastolic dysfunction.  · Mild right ventricular enlargement with normal right ventricular   systolic function.  · Mild left atrial enlargement.  · Mild right atrial enlargement.  · Mild tricuspid regurgitation.

## 2022-02-26 NOTE — SUBJECTIVE & OBJECTIVE
Interval History: off precedex this AM. Still a bit somnolent but makes eye contact when called by name and seems to be aware of situation. Is on low flow NC and stable.     Review of Systems   Constitutional: Negative.    Respiratory: Negative.     Cardiovascular: Negative.    Gastrointestinal: Negative.    Objective:     Vital Signs (Most Recent):  Temp: 98 °F (36.7 °C) (02/26/22 0400)  Pulse: (!) 55 (02/26/22 0830)  Resp: 15 (02/26/22 0830)  BP: 103/63 (02/26/22 0830)  SpO2: 100 % (02/26/22 0830)   Vital Signs (24h Range):  Temp:  [98 °F (36.7 °C)-98.6 °F (37 °C)] 98 °F (36.7 °C)  Pulse:  [] 55  Resp:  [10-58] 15  SpO2:  [95 %-100 %] 100 %  BP: ()/(52-92) 103/63     Weight: 99.8 kg (220 lb 0.3 oz)  Body mass index is 31.57 kg/m².    Intake/Output Summary (Last 24 hours) at 2/26/2022 1047  Last data filed at 2/26/2022 0800  Gross per 24 hour   Intake 409.15 ml   Output 5210 ml   Net -4800.85 ml      Physical Exam  Vitals and nursing note reviewed.   Constitutional:       General: He is not in acute distress.     Appearance: He is not toxic-appearing.   Cardiovascular:      Rate and Rhythm: Regular rhythm. Bradycardia present.   Pulmonary:      Effort: Pulmonary effort is normal.      Breath sounds: No wheezing or rales.   Abdominal:      Palpations: Abdomen is soft.   Musculoskeletal:      Right lower leg: No edema.      Left lower leg: No edema.   Skin:     General: Skin is warm.   Neurological:      Comments: Drowsy but able to engage in conversation   Psychiatric:         Mood and Affect: Mood normal.         Behavior: Behavior normal.       Significant Labs: All pertinent labs within the past 24 hours have been reviewed.    Significant Imaging: I have reviewed all pertinent imaging results/findings within the past 24 hours.  I have reviewed and interpreted all pertinent imaging results/findings within the past 24 hours.

## 2022-02-26 NOTE — PLAN OF CARE
Step 1 - Admission - Current ([facility] - IP DIABETIC KETOACIDOSIS (DKA/HHS) - [facility])  MD: Glucose less than 200; MD should discontinue insulin drip  Outcome: Met  MD: CO2 greater than or equal to 20; MD should discontinue insulin drip  Outcome: Met  MD: Anion gap less than or equal to 12; MD should discontinue insulin drip  Outcome: Met  MD: POTASSIUM BETWEEN 3.3 AND 5.5  Outcome: Met  RN: Fluids switched when glucose level is 250  Outcome: Met  RN:  BMP drawn Q2-Q6  Outcome: Met  RN: A1C drawn  Outcome: Met  RN:  Patient free from episodes of hypoglycemia (documented since admission)  Outcome: Met     Problem: Infection  Goal: Absence of Infection Signs and Symptoms  Outcome: Ongoing, Progressing     Problem: Fall Injury Risk  Goal: Absence of Fall and Fall-Related Injury  Outcome: Ongoing, Progressing     Problem: Coping Ineffective  Goal: Effective Coping  Outcome: Ongoing, Progressing     Problem: Adult Inpatient Plan of Care  Goal: Plan of Care Review  Outcome: Ongoing, Progressing  Goal: Patient-Specific Goal (Individualized)  Outcome: Ongoing, Progressing  Goal: Absence of Hospital-Acquired Illness or Injury  Outcome: Ongoing, Progressing  Goal: Optimal Comfort and Wellbeing  Outcome: Ongoing, Progressing  Goal: Readiness for Transition of Care  Outcome: Ongoing, Progressing     Problem: Skin Injury Risk Increased  Goal: Skin Health and Integrity  Outcome: Ongoing, Progressing     Problem: Impaired Wound Healing  Goal: Optimal Wound Healing  Outcome: Ongoing, Progressing

## 2022-02-26 NOTE — ASSESSMENT & PLAN NOTE
-Metastatic squamous cell carcinoma of lung diagnosed in 2019.  S/p palliative chemo with oncology.  Currently followed by Dr. Hathaway.    -repeat imaging from current hospitalization showed improve aeration when compared to 2019 imaging.      2/26 => Consider right thoracentesis if still present prior to discharge.  Metastatic effusion to contralateral lung without parenchymal changes is uncommon.

## 2022-02-26 NOTE — HOSPITAL COURSE
2/26 =>  On BiPAP overnight following extubation around 13:00 yesterday.  Now on nasal cannula with comfortable breathing and no desaturation.

## 2022-02-26 NOTE — ASSESSMENT & PLAN NOTE
Patient with Hypercapnic and Hypoxic Respiratory failure which is Acute.  he is not on home oxygen. Supplemental oxygen was provided and noted- Vent Mode: PS/CPAP  Oxygen Concentration (%):  [40] 40  PEEP/CPAP:  [8 cmH20] 8 cmH20  Pressure Support:  [5 cmH20] 5 cmH20  Mean Airway Pressure:  [9.1 cmH20] 9.1 cmH20.   Signs/symptoms of respiratory failure include- respiratory distress. Contributing diagnoses includes - I suspect cardiogenic pulmonary edema. I also suspect tachypnea to compensate for acidemia Labs and images were reviewed. Patient Has recent ABG, which has been reviewed. Will treat underlying causes and adjust management of respiratory failure as follows   Based on workup thus far, acuity of condition is likely due to cardiogenic edema given new diagnosis of heart failure. Is on furosemide. Cotninue stress ulcer prophylaxis. Extubated yesterday and doing well on low flow NC. Off precedex now and waking up. Will start a diet and hopefully out of bed today. If does well, will consider stepping down to floor.

## 2022-02-26 NOTE — ASSESSMENT & PLAN NOTE
-intubated due to agitation and hypoxemia  -cxr without detrimental changes when compared to prior cxr.  CT with georges and lll atelectasis from underlying lung disease.  Overall, left lung aeration is better when compared to 2019 imaging.  -new right effusion.  Suspect secondary to volume overload with HFrEF  -will hold ivf and start a trial of lasix.    -mildly elevated procal.  Currently on vanco/zosyn.  Unable to collect sputum.  recommend 5 days course.    -extubated on 2/23/22 after sbt.  Doing well on nasal canula for about 10 hours prior to reintubation.  ?flash pulmonary edema in spiked in BNP after intubation  -back on minimal vent support in term of oxygenation.    -check cardiac enzymes.  -will start empiric steroid due smoking history along with respiratory decompensation after extubation  -will place patient on t-piece and possible extubation 2/25.    2/26 => Extubated for 22 hours with BiPAP overnight.  Now on nasal cannula with no desaturation or stridor noted.  Should be able to discontinue BiPAP altogether and wean/off steroids.  Likely multi-factorial contributions including COPD, pneumonia, and heart failure.  Would plan on repeat CXR and possible ultrasound-guided right thoracentesis prior to hospital discharge to determine whether effusion is secondary to CHF or possible metastatic malignancy that would warrant a change in the oncologic treatment plan.

## 2022-02-26 NOTE — SUBJECTIVE & OBJECTIVE
Interval History: 2/26 =>  On BiPAP overnight following extubation around 13:00 yesterday.  Now on nasal cannula with comfortable breathing and no desaturation.    Objective:     Vital Signs (Most Recent):  Temp: 98 °F (36.7 °C) (02/26/22 0400)  Pulse: (!) 55 (02/26/22 0830)  Resp: 15 (02/26/22 0830)  BP: 103/63 (02/26/22 0830)  SpO2: 100 % (02/26/22 0830)   Vital Signs (24h Range):  Temp:  [98 °F (36.7 °C)-98.6 °F (37 °C)] 98 °F (36.7 °C)  Pulse:  [] 55  Resp:  [10-58] 15  SpO2:  [95 %-100 %] 100 %  BP: ()/(52-92) 103/63     Weight: 99.8 kg (220 lb 0.3 oz)  Body mass index is 31.57 kg/m².      Intake/Output Summary (Last 24 hours) at 2/26/2022 1054  Last data filed at 2/26/2022 0800  Gross per 24 hour   Intake 409.15 ml   Output 5210 ml   Net -4800.85 ml       Physical Exam  Vitals and nursing note reviewed.   Constitutional:       General: He is not in acute distress.     Appearance: He is not toxic-appearing.      Comments: Sleeping comfortably; answers questions appropriately upon awakening.   Cardiovascular:      Rate and Rhythm: Normal rate and regular rhythm.      Heart sounds: Normal heart sounds. No murmur heard.    No gallop.   Pulmonary:      Effort: Pulmonary effort is normal. No respiratory distress.      Breath sounds: Normal breath sounds. No stridor. No wheezing or rales.   Abdominal:      General: Bowel sounds are normal. There is no distension.      Palpations: Abdomen is soft.      Tenderness: There is no abdominal tenderness. There is no rebound.   Neurological:      General: No focal deficit present.      Mental Status: Mental status is at baseline.       Vents:  Nasal cannula oxygen      Lines/Drains/Airways       Peripherally Inserted Central Catheter Line  Duration             PICC Triple Lumen 02/22/22 2020 right brachial 3 days              Central Venous Catheter Line  Duration             Port A Cath Single Lumen right subclavian -- days              Drain  Duration                   Urethral Catheter 02/22/22 1530 Straight-tip 16 Fr. 3 days              Peripheral Intravenous Line  Duration                  Peripheral IV - Single Lumen 02/22/22 1400 20 G Anterior;Distal;Left Upper Arm 3 days         Peripheral IV - Single Lumen 02/22/22 1737 22 G Right Upper Arm 3 days                    Significant Labs:    CBC/Anemia Profile:  Recent Labs   Lab 02/25/22  0503 02/26/22  0542   WBC 9.18 9.43   HGB 10.6* 10.6*   HCT 32.9* 32.1*    194   MCV 97 98   RDW 16.8* 16.6*        Chemistries:  Recent Labs   Lab 02/25/22  0503 02/26/22  0542    144   K 4.5 3.6    102   CO2 26 32*   BUN 9 12   CREATININE 1.2 1.1   CALCIUM 8.8 8.8   ALBUMIN 2.8* 2.9*   PROT 6.6 6.7   BILITOT 0.7 0.7   ALKPHOS 72 63   ALT 18 18   AST 25 21   MG 1.7  --    PHOS 5.0* 4.1       All pertinent labs within the past 24 hours have been reviewed.    Significant Imaging:  I have reviewed all pertinent imaging results/findings within the past 24 hours.  No new imaging but modest right pleural effusion present on admission.

## 2022-02-26 NOTE — PROGRESS NOTES
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 Patient is currently intubated and unable to provide history. His wife, who is at bedside, states he was in his normal state up until today. States he had been eating and drinking just fine but did have to hide his "coke" which he drinks in excess. Apparently had "coke" again this AM as he felt that would help his breathing. He is currently on palliative chemotherapy with Gemcitabine, last dose given 2/15/2022 (about a week ago). Is no longer smoking.     In the ED, patient had progressive respiratory distress. Became diaphoretic and confused. Was also pulling oxygen mask. Patient was therefore intubated. Labwork significant for lactic acidosis of 8.7, hyperglycemia, HAGMA, hypokalemia, mild hyponatremia, hyperphosphatemia, BNP 700s, hyaline casts in UA and troponin 0.9. No ST segment elevation or depression. Cardiology and pulmonology consulted. Given fluids, empiric abx and admitted to ICU.       Overview/Hospital Course:  Mr Iverson presented with acute respiratory failure with hypercapnia/hypoxia. Intubated in the ED. Started on broad spectrum antibiotics. Blood and resp cultures obtained. No pneumothorax, large consolidation or pulmonary embolism but with moderate right sided pleural effusion and known left main bronchial cancerous mass. Given fluids. Lactic acidosis resolved. Questionable diabetic ketoacidosis given hyperglycemia and high anion gap metabolic acidosis. Placed on " insulin infusion. Also started on full dose anticoagulation, double antiplatelet therapy and statin for suspected NSTEMI. Glucose normalized and HAGMA resolved. Insulin discontinued. Fluids stopped. HgbA1c < 6% which is not consistent with diabetes. Cardiology and pulmonology consulted. Echocardiogram showed LVEF 35%-40% with grade 1 diastolic dysfunction. Furosemide IV given. Extubated to NC on 2/23. Overnight patient became hyperactive and encephalopathic. Experienced respiratory failure. Re-intubated. Diuresed. Extubated on 2/25 with precedex in place to reduce anxiety/paranoia post extubation. This worked well and remained stable on low flow NC.       Interval History: off precedex this AM. Still a bit somnolent but makes eye contact when called by name and seems to be aware of situation. Is on low flow NC and stable.     Review of Systems   Constitutional: Negative.    Respiratory: Negative.     Cardiovascular: Negative.    Gastrointestinal: Negative.    Objective:     Vital Signs (Most Recent):  Temp: 98 °F (36.7 °C) (02/26/22 0400)  Pulse: (!) 55 (02/26/22 0830)  Resp: 15 (02/26/22 0830)  BP: 103/63 (02/26/22 0830)  SpO2: 100 % (02/26/22 0830)   Vital Signs (24h Range):  Temp:  [98 °F (36.7 °C)-98.6 °F (37 °C)] 98 °F (36.7 °C)  Pulse:  [] 55  Resp:  [10-58] 15  SpO2:  [95 %-100 %] 100 %  BP: ()/(52-92) 103/63     Weight: 99.8 kg (220 lb 0.3 oz)  Body mass index is 31.57 kg/m².    Intake/Output Summary (Last 24 hours) at 2/26/2022 1047  Last data filed at 2/26/2022 0800  Gross per 24 hour   Intake 409.15 ml   Output 5210 ml   Net -4800.85 ml      Physical Exam  Vitals and nursing note reviewed.   Constitutional:       General: He is not in acute distress.     Appearance: He is not toxic-appearing.   Cardiovascular:      Rate and Rhythm: Regular rhythm. Bradycardia present.   Pulmonary:      Effort: Pulmonary effort is normal.      Breath sounds: No wheezing or rales.   Abdominal:      Palpations:  Abdomen is soft.   Musculoskeletal:      Right lower leg: No edema.      Left lower leg: No edema.   Skin:     General: Skin is warm.   Neurological:      Comments: Drowsy but able to engage in conversation   Psychiatric:         Mood and Affect: Mood normal.         Behavior: Behavior normal.       Significant Labs: All pertinent labs within the past 24 hours have been reviewed.    Significant Imaging: I have reviewed all pertinent imaging results/findings within the past 24 hours.  I have reviewed and interpreted all pertinent imaging results/findings within the past 24 hours.      Assessment/Plan:      * Acute respiratory failure with hypoxia and hypercapnia  Patient with Hypercapnic and Hypoxic Respiratory failure which is Acute.  he is not on home oxygen. Supplemental oxygen was provided and noted- Vent Mode: PS/CPAP  Oxygen Concentration (%):  [40] 40  PEEP/CPAP:  [8 cmH20] 8 cmH20  Pressure Support:  [5 cmH20] 5 cmH20  Mean Airway Pressure:  [9.1 cmH20] 9.1 cmH20.   Signs/symptoms of respiratory failure include- respiratory distress. Contributing diagnoses includes - I suspect cardiogenic pulmonary edema. I also suspect tachypnea to compensate for acidemia Labs and images were reviewed. Patient Has recent ABG, which has been reviewed. Will treat underlying causes and adjust management of respiratory failure as follows   Based on workup thus far, acuity of condition is likely due to cardiogenic edema given new diagnosis of heart failure. Is on furosemide. Cotninue stress ulcer prophylaxis. Extubated yesterday and doing well on low flow NC. Off precedex now and waking up. Will start a diet and hopefully out of bed today. If does well, will consider stepping down to floor.     High anion gap metabolic acidosis  Likely due to lactic acidosis. Resolved         NSTEMI (non-ST elevated myocardial infarction)  Aspirin and clopidogrel given. Statin. Completed 48 hours of full dose anticoagulation. Echocardiogram  significant for LVEF 35-40%, which is new. Cardiology planning on ischemic workup when more stable.   BB and ACE-I when able to tolerate      Hyperglycemia   HgbA1c not consistent with diabetes. Stress induced?        Lactic acidosis  Resolved with fluids      Palliative care encounter  Advance Care Planning     Date: 02/22/2022    [facility]  I engaged the wife in a conversation about advance care planning and we specifically addressed what the goals of care would be moving forward, in light of the patient's change in clinical status.  We did specifically address the patient's likely prognosis, which is fair .  We explored the patient's values and preferences for future care.  The wife endorses that what is most important right now is to focus on curative/life-prolongation (regardless of treatment burdens)    Accordingly, we have decided that the best plan to meet the patient's goals includes continuing with treatment    I did not explain the role for hospice care at this stage of the patient's illness, including its ability to help the patient live with the best quality of life possible.  We will not be making a hospice referral.    I spent a total of > 15 minutes engaging the patient in this advance care planning discussion.    Dr Tran states she had a code status conversation with patient prior to intubation. Patient prefers aggressive care and full code. Wife confirms that these are his wishes.              Hyperphosphatemia  PTH high. Hyperphosphatemia resolved. iCa pending      Hypokalemia  Replaced. Repeat CMP at noon      Primary hypertension  BP not high. Hold off on antihypertensives      Immunodeficiency due to drugs  Noted. Is not leukopenic or neutropenic      Secondary malignant neoplasm of bone  Noted       Lung cancer, main bronchus, left  Noted. Is stage 4. On palliative chemotherapy. Followed by Dr Hathaway as outpatient      History of completed stroke  Noted       Macrocytic anemia  No indication for  transfusion at this time    Former smoker  Per patient's wife, patient has been abstinent for 3 years      VTE Risk Mitigation (From admission, onward)         Ordered     enoxaparin injection 40 mg  Daily         02/24/22 1446                Discharge Planning   AZ:      Code Status: Full Code   Is the patient medically ready for discharge?:     Reason for patient still in hospital (select all that apply): Treatment  Discharge Plan A: Home with family (Patient has OP therapy sessions scheduled)       Tentative step down today    Will discuss with wife    Critical care time spent on the evaluation and treatment of severe organ dysfunction, review of pertinent labs and imaging studies, discussions with consulting providers and discussions with patient/family: > 35 minutes.      Abbey Disla MD  Department of Hospital Medicine   [facility] [place] [facility]

## 2022-02-27 LAB
ALBUMIN SERPL BCP-MCNC: 3.4 G/DL (ref 3.5–5.2)
ALP SERPL-CCNC: 75 U/L (ref 55–135)
ALT SERPL W/O P-5'-P-CCNC: 31 U/L (ref 10–44)
ANION GAP SERPL CALC-SCNC: 12 MMOL/L (ref 8–16)
AST SERPL-CCNC: 42 U/L (ref 10–40)
BASOPHILS # BLD AUTO: 0.02 K/UL (ref 0–0.2)
BASOPHILS NFR BLD: 0.1 % (ref 0–1.9)
BILIRUB SERPL-MCNC: 0.8 MG/DL (ref 0.1–1)
BUN SERPL-MCNC: 20 MG/DL (ref 8–23)
CALCIUM SERPL-MCNC: 9.4 MG/DL (ref 8.7–10.5)
CHLORIDE SERPL-SCNC: 99 MMOL/L (ref 95–110)
CO2 SERPL-SCNC: 33 MMOL/L (ref 23–29)
CREAT SERPL-MCNC: 1.3 MG/DL (ref 0.5–1.4)
DIFFERENTIAL METHOD: ABNORMAL
EOSINOPHIL # BLD AUTO: 0 K/UL (ref 0–0.5)
EOSINOPHIL NFR BLD: 0.1 % (ref 0–8)
ERYTHROCYTE [DISTWIDTH] IN BLOOD BY AUTOMATED COUNT: 17 % (ref 11.5–14.5)
EST. GFR  (AFRICAN AMERICAN): >60 ML/MIN/1.73 M^2
EST. GFR  (NON AFRICAN AMERICAN): 59 ML/MIN/1.73 M^2
GLUCOSE SERPL-MCNC: 100 MG/DL (ref 70–110)
HCT VFR BLD AUTO: 38.4 % (ref 40–54)
HGB BLD-MCNC: 12.3 G/DL (ref 14–18)
IMM GRANULOCYTES # BLD AUTO: 0.04 K/UL (ref 0–0.04)
IMM GRANULOCYTES NFR BLD AUTO: 0.3 % (ref 0–0.5)
LYMPHOCYTES # BLD AUTO: 2.5 K/UL (ref 1–4.8)
LYMPHOCYTES NFR BLD: 18.1 % (ref 18–48)
MCH RBC QN AUTO: 31.9 PG (ref 27–31)
MCHC RBC AUTO-ENTMCNC: 32 G/DL (ref 32–36)
MCV RBC AUTO: 100 FL (ref 82–98)
MONOCYTES # BLD AUTO: 2.6 K/UL (ref 0.3–1)
MONOCYTES NFR BLD: 18.8 % (ref 4–15)
NEUTROPHILS # BLD AUTO: 8.6 K/UL (ref 1.8–7.7)
NEUTROPHILS NFR BLD: 62.6 % (ref 38–73)
NRBC BLD-RTO: 0 /100 WBC
PHOSPHATE SERPL-MCNC: 2.3 MG/DL (ref 2.7–4.5)
PLATELET # BLD AUTO: 262 K/UL (ref 150–450)
PMV BLD AUTO: 10.3 FL (ref 9.2–12.9)
POCT GLUCOSE: 92 MG/DL (ref 70–110)
POTASSIUM SERPL-SCNC: 3.2 MMOL/L (ref 3.5–5.1)
PROT SERPL-MCNC: 7.6 G/DL (ref 6–8.4)
RBC # BLD AUTO: 3.86 M/UL (ref 4.6–6.2)
SODIUM SERPL-SCNC: 144 MMOL/L (ref 136–145)
WBC # BLD AUTO: 13.83 K/UL (ref 3.9–12.7)

## 2022-02-27 PROCEDURE — 21400001 HC TELEMETRY ROOM

## 2022-02-27 PROCEDURE — 99223 1ST HOSP IP/OBS HIGH 75: CPT | Mod: ,,, | Performed by: INTERNAL MEDICINE

## 2022-02-27 PROCEDURE — 63600175 PHARM REV CODE 636 W HCPCS: Performed by: INTERNAL MEDICINE

## 2022-02-27 PROCEDURE — A4216 STERILE WATER/SALINE, 10 ML: HCPCS | Performed by: INTERNAL MEDICINE

## 2022-02-27 PROCEDURE — 25000003 PHARM REV CODE 250: Performed by: STUDENT IN AN ORGANIZED HEALTH CARE EDUCATION/TRAINING PROGRAM

## 2022-02-27 PROCEDURE — 94640 AIRWAY INHALATION TREATMENT: CPT

## 2022-02-27 PROCEDURE — 25000003 PHARM REV CODE 250: Performed by: INTERNAL MEDICINE

## 2022-02-27 PROCEDURE — 99223 PR INITIAL HOSPITAL CARE,LEVL III: ICD-10-PCS | Mod: ,,, | Performed by: INTERNAL MEDICINE

## 2022-02-27 PROCEDURE — 36415 COLL VENOUS BLD VENIPUNCTURE: CPT | Performed by: INTERNAL MEDICINE

## 2022-02-27 PROCEDURE — 25000242 PHARM REV CODE 250 ALT 637 W/ HCPCS: Performed by: INTERNAL MEDICINE

## 2022-02-27 PROCEDURE — 84100 ASSAY OF PHOSPHORUS: CPT | Performed by: INTERNAL MEDICINE

## 2022-02-27 PROCEDURE — 80053 COMPREHEN METABOLIC PANEL: CPT | Performed by: INTERNAL MEDICINE

## 2022-02-27 PROCEDURE — 85025 COMPLETE CBC W/AUTO DIFF WBC: CPT | Performed by: INTERNAL MEDICINE

## 2022-02-27 PROCEDURE — 93010 EKG 12-LEAD: ICD-10-PCS | Mod: ,,, | Performed by: INTERNAL MEDICINE

## 2022-02-27 PROCEDURE — 94761 N-INVAS EAR/PLS OXIMETRY MLT: CPT

## 2022-02-27 PROCEDURE — 94760 N-INVAS EAR/PLS OXIMETRY 1: CPT

## 2022-02-27 PROCEDURE — 93005 ELECTROCARDIOGRAM TRACING: CPT

## 2022-02-27 PROCEDURE — 93010 ELECTROCARDIOGRAM REPORT: CPT | Mod: ,,, | Performed by: INTERNAL MEDICINE

## 2022-02-27 RX ORDER — METOPROLOL TARTRATE 25 MG/1
12.5 TABLET ORAL ONCE
Status: COMPLETED | OUTPATIENT
Start: 2022-02-27 | End: 2022-02-27

## 2022-02-27 RX ADMIN — ATORVASTATIN CALCIUM 80 MG: 40 TABLET, FILM COATED ORAL at 09:02

## 2022-02-27 RX ADMIN — CLOPIDOGREL 75 MG: 75 TABLET, FILM COATED ORAL at 09:02

## 2022-02-27 RX ADMIN — Medication 10 ML: at 01:02

## 2022-02-27 RX ADMIN — IPRATROPIUM BROMIDE AND ALBUTEROL SULFATE 3 ML: 2.5; .5 SOLUTION RESPIRATORY (INHALATION) at 08:02

## 2022-02-27 RX ADMIN — Medication 10 ML: at 12:02

## 2022-02-27 RX ADMIN — IPRATROPIUM BROMIDE AND ALBUTEROL SULFATE 3 ML: 2.5; .5 SOLUTION RESPIRATORY (INHALATION) at 07:02

## 2022-02-27 RX ADMIN — ASPIRIN 81 MG CHEWABLE TABLET 81 MG: 81 TABLET CHEWABLE at 09:02

## 2022-02-27 RX ADMIN — FAMOTIDINE 20 MG: 10 INJECTION INTRAVENOUS at 09:02

## 2022-02-27 RX ADMIN — PREDNISONE 25 MG: 5 TABLET ORAL at 09:02

## 2022-02-27 RX ADMIN — Medication 10 ML: at 06:02

## 2022-02-27 RX ADMIN — MUPIROCIN: 20 OINTMENT TOPICAL at 09:02

## 2022-02-27 RX ADMIN — FUROSEMIDE 60 MG: 10 INJECTION, SOLUTION INTRAMUSCULAR; INTRAVENOUS at 09:02

## 2022-02-27 RX ADMIN — IPRATROPIUM BROMIDE AND ALBUTEROL SULFATE 3 ML: 2.5; .5 SOLUTION RESPIRATORY (INHALATION) at 01:02

## 2022-02-27 RX ADMIN — ENOXAPARIN SODIUM 40 MG: 40 INJECTION SUBCUTANEOUS at 05:02

## 2022-02-27 RX ADMIN — FUROSEMIDE 60 MG: 10 INJECTION, SOLUTION INTRAMUSCULAR; INTRAVENOUS at 01:02

## 2022-02-27 RX ADMIN — FUROSEMIDE 60 MG: 10 INJECTION, SOLUTION INTRAMUSCULAR; INTRAVENOUS at 06:02

## 2022-02-27 RX ADMIN — METOPROLOL TARTRATE 12.5 MG: 25 TABLET, FILM COATED ORAL at 04:02

## 2022-02-27 NOTE — SUBJECTIVE & OBJECTIVE
Interval History:  Doing fine.  No chest pain.  Breathing better.  Talked to wife also today who was in the room with the patient    Review of Systems   Constitutional: Positive for malaise/fatigue.   HENT: Negative.     Eyes: Negative.    Cardiovascular: Negative.    Respiratory: Negative.     Endocrine: Negative.    Hematologic/Lymphatic: Negative.    Skin: Negative.    Musculoskeletal: Negative.    Gastrointestinal: Negative.    Genitourinary: Negative.    Neurological: Negative.    Psychiatric/Behavioral: Negative.     Allergic/Immunologic: Negative.    Objective:     Vital Signs (Most Recent):  Temp: 98.3 °F (36.8 °C) (02/27/22 1154)  Pulse: 70 (02/27/22 1320)  Resp: 15 (02/27/22 1320)  BP: 130/76 (02/27/22 1154)  SpO2: 95 % (02/27/22 1320) Vital Signs (24h Range):  Temp:  [98.1 °F (36.7 °C)-98.7 °F (37.1 °C)] 98.3 °F (36.8 °C)  Pulse:  [] 70  Resp:  [15-63] 15  SpO2:  [91 %-100 %] 95 %  BP: (125-192)/(66-98) 130/76     Weight: 99.8 kg (220 lb 0.3 oz)  Body mass index is 31.57 kg/m².     SpO2: 95 %  O2 Device (Oxygen Therapy): room air      Intake/Output Summary (Last 24 hours) at 2/27/2022 1547  Last data filed at 2/27/2022 1246  Gross per 24 hour   Intake 1820 ml   Output 2800 ml   Net -980 ml       Lines/Drains/Airways       Peripherally Inserted Central Catheter Line  Duration             PICC Triple Lumen 02/22/22 2020 right brachial 4 days              Central Venous Catheter Line  Duration             Port A Cath Single Lumen right subclavian -- days              Drain  Duration                  Urethral Catheter 02/22/22 1530 Straight-tip 16 Fr. 5 days              Peripheral Intravenous Line  Duration                  Peripheral IV - Single Lumen 02/22/22 1400 20 G Anterior;Distal;Left Upper Arm 5 days         Peripheral IV - Single Lumen 02/22/22 1737 22 G Right Upper Arm 4 days                    Physical Exam  Vitals reviewed.   Constitutional:       Appearance: He is well-developed.   HENT:       Head: Normocephalic.   Eyes:      Conjunctiva/sclera: Conjunctivae normal.      Pupils: Pupils are equal, round, and reactive to light.   Cardiovascular:      Rate and Rhythm: Normal rate and regular rhythm.      Heart sounds: Normal heart sounds.   Pulmonary:      Effort: Pulmonary effort is normal.      Breath sounds: Normal breath sounds.   Abdominal:      General: Bowel sounds are normal.      Palpations: Abdomen is soft.   Musculoskeletal:      Cervical back: Normal range of motion and neck supple.   Skin:     General: Skin is warm.   Neurological:      Mental Status: He is alert and oriented to person, place, and time.       Significant Labs: BMP:   Recent Labs   Lab 02/26/22  0542 02/27/22 0441   * 100    144   K 3.6 3.2*    99   CO2 32* 33*   BUN 12 20   CREATININE 1.1 1.3   CALCIUM 8.8 9.4   , CMP   Recent Labs   Lab 02/26/22  0542 02/27/22  0441    144   K 3.6 3.2*    99   CO2 32* 33*   * 100   BUN 12 20   CREATININE 1.1 1.3   CALCIUM 8.8 9.4   PROT 6.7 7.6   ALBUMIN 2.9* 3.4*   BILITOT 0.7 0.8   ALKPHOS 63 75   AST 21 42*   ALT 18 31   ANIONGAP 10 12   ESTGFRAFRICA >60 >60   EGFRNONAA >60 59*   , CBC   Recent Labs   Lab 02/26/22  0542 02/27/22 0441   WBC 9.43 13.83*   HGB 10.6* 12.3*   HCT 32.1* 38.4*    262   , INR No results for input(s): INR, PROTIME in the last 48 hours., Lipid Panel No results for input(s): CHOL, HDL, LDLCALC, TRIG, CHOLHDL in the last 48 hours., Troponin No results for input(s): TROPONINI in the last 48 hours., and All pertinent lab results from the last 24 hours have been reviewed.    Significant Imaging: Echocardiogram: Transthoracic echo (TTE) complete (Cupid Only):   Results for orders placed or performed during the hospital encounter of 02/22/22   Echo   Result Value Ref Range    BSA 2.29 m2    TDI SEPTAL 0.04 m/s    LV LATERAL E/E' RATIO 7.29 m/s    LV SEPTAL E/E' RATIO 12.75 m/s    LA WIDTH 4.99 cm    AORTIC VALVE CUSP  SEPERATION 2.31 cm    TDI LATERAL 0.07 m/s    PV PEAK VELOCITY 0.75 cm/s    LVIDd 4.49 3.5 - 6.0 cm    IVS 1.33 (A) 0.6 - 1.1 cm    Posterior Wall 1.34 (A) 0.6 - 1.1 cm    Ao root annulus 3.92 cm    LVIDs 3.80 2.1 - 4.0 cm    FS 15 28 - 44 %    LA volume 111.72 cm3    Sinus 3.83 cm    STJ 2.92 cm    Ascending aorta 3.08 cm    LV mass 230.68 g    LA size 4.42 cm    RVDD 4.46 cm    TAPSE 1.56 cm    RV S' 9.12 cm/s    Left Ventricle Relative Wall Thickness 0.60 cm    AV mean gradient 3 mmHg    AV valve area 2.95 cm2    AV Velocity Ratio 0.63     AV index (prosthetic) 0.66     MV valve area p 1/2 method 3.41 cm2    E/A ratio 1.21     Mean e' 0.06 m/s    E wave deceleration time 222.66 msec    IVRT 283.74 msec    Pulm vein S/D ratio 1.04     LVOT diameter 2.39 cm    LVOT area 4.5 cm2    LVOT peak luigi 0.70 m/s    LVOT peak VTI 13.85 cm    Ao peak luigi 1.11 m/s    Ao VTI 21.02 cm    LVOT stroke volume 62.10 cm3    AV peak gradient 5 mmHg    E/E' ratio 9.27 m/s    MV Peak E Luigi 0.51 m/s    TR Max Luigi 2.33 m/s    MV stenosis pressure 1/2 time 64.57 ms    MV Peak A Luigi 0.42 m/s    PV Peak S Luigi 0.29 m/s    PV Peak D Luigi 0.28 m/s    LV Systolic Volume 61.96 mL    LV Systolic Volume Index 28.6 mL/m2    LV Diastolic Volume 92.20 mL    LV Diastolic Volume Index 42.49 mL/m2    LA Volume Index 51.5 mL/m2    LV Mass Index 106 g/m2    RA Major Axis 5.73 cm    Left Atrium Minor Axis 6.03 cm    Left Atrium Major Axis 5.89 cm    Triscuspid Valve Regurgitation Peak Gradient 22 mmHg    RA Width 4.32 cm    EF 35 %    Narrative    · The left ventricle is normal in size with concentric hypertrophy and   moderately decreased systolic function.  · The estimated ejection fraction is 35-40%.  · Grade I left ventricular diastolic dysfunction.  · Mild right ventricular enlargement with normal right ventricular   systolic function.  · Mild left atrial enlargement.  · Mild right atrial enlargement.  · Mild tricuspid regurgitation.

## 2022-02-27 NOTE — NURSING
Note that patient is confused with unsteady gait and has diarrhea. Patient constantly out of bed to bedside commode. V/S stable at this time. On 2L NC.     All safety measures continued.     Will continue to f/u.

## 2022-02-27 NOTE — NURSING
Throughout shift, pt cont. To be confused and attempted to get out of bed without assistance. Pt is has an unsteady gait. Tele sitter added to pt room for safety measures. Pt has had several loose stool using BSC. He stated he thinks its something he had eaten. Pt in the bed with bed alarm on and tele sitter in place. Bed in lowest position and room door opened. LBS 92. Pt catheter continues to drain clear, yellow urine. Has taken sips of water since arrived on floor.

## 2022-02-27 NOTE — PLAN OF CARE
Step 1 - Admission - Current ([facility] - IP DIABETIC KETOACIDOSIS (DKA/HHS) - [facility])  MD: Glucose less than 200; MD should discontinue insulin drip  2/26/2022 2152 by Lilia Squires RN  Outcome: Met  2/26/2022 2151 by Lilia Squires RN  Outcome: Met  MD: CO2 greater than or equal to 20; MD should discontinue insulin drip  2/26/2022 2152 by Lilia Squires, NEERAJ  Outcome: Met  2/26/2022 2151 by Lilia Squires RN  Outcome: Met  MD: Anion gap less than or equal to 12; MD should discontinue insulin drip  2/26/2022 2152 by Lilia Squires RN  Outcome: Met  2/26/2022 2151 by Lilia Squires RN  Outcome: Met  MD: POTASSIUM BETWEEN 3.3 AND 5.5  2/26/2022 2152 by Lilia Squires RN  Outcome: Met  2/26/2022 2151 by Lilia Squires RN  Outcome: Met  RN: Fluids switched when glucose level is 250  2/26/2022 2152 by Lilia Squires RN  Outcome: Met  2/26/2022 2151 by Lilia Squires RN  Outcome: Met  RN:  BMP drawn Q2-Q6  2/26/2022 2152 by Lilia Squires RN  Outcome: Met  2/26/2022 2151 by Lilia Squires RN  Outcome: Met  RN: A1C drawn  2/26/2022 2152 by Lilia Squirse RN  Outcome: Met  2/26/2022 2151 by Lilia Squires RN  Outcome: Met  RN:  Patient free from episodes of hypoglycemia (documented since admission)  2/26/2022 2152 by Lilia Squires RN  Outcome: Met  2/26/2022 2151 by Lilia Squires RN  Outcome: Met     Step 2 - Care ([facility] - IP DIABETIC KETOACIDOSIS (DKA/[facility]HS) - [facility])  RN: Patient free from episodes of hypoglycemia (documented since admission)  Outcome: Met  RN: Insulin discontinued 1-2 hours after basal insulin was administered  Outcome: Met  RN: Patient tolerating PO diet  Outcome: Met  RD: Nutrition consult complete  Outcome: Met  CM: Outpatient follow up appointment scheduled prior to discharge  Outcome: Met     Problem: Infection  Goal: Absence of Infection Signs and Symptoms  Outcome: Adequate for Care Transition     Problem: Fall Injury Risk  Goal: Absence  of Fall and Fall-Related Injury  Outcome: Adequate for Care Transition     Problem: Coping Ineffective  Goal: Effective Coping  Outcome: Adequate for Care Transition     Problem: Adult Inpatient Plan of Care  Goal: Plan of Care Review  Outcome: Adequate for Care Transition  Goal: Patient-Specific Goal (Individualized)  Outcome: Adequate for Care Transition  Goal: Absence of Hospital-Acquired Illness or Injury  Outcome: Adequate for Care Transition  Goal: Optimal Comfort and Wellbeing  Outcome: Adequate for Care Transition  Goal: Readiness for Transition of Care  Outcome: Adequate for Care Transition     Problem: Skin Injury Risk Increased  Goal: Skin Health and Integrity  Outcome: Adequate for Care Transition     Problem: Impaired Wound Healing  Goal: Optimal Wound Healing  Outcome: Adequate for Care Transition

## 2022-02-27 NOTE — NURSING
Patient arrived to the medsurg/tele unit via hospital bed with 1 transport tech and the caring nurse NEERAJ Rodrigues, Arrived on 2L via NC Sats 98 %. Pt alert, oriented to self. During assessment, pt was able to relay information regarding having lung cancer. However, he could not recall ever having chemo. Pt did not show any s/s of distress at the time of arrival.  Arriving BP slightly elevated at 157/87. Continuing to monitor. No c/o SOB. Arrival EKG obtained. Pt has stable HR with NSR noted at this time. Lemon in place upon arrival. Draining clear, yellow urine. No c/o of lower Abd pain.  Port A Cath accessed prior to patient arriving to floor. Needle left in place. Pt also has PICC to upper right arm. Both sites has blood return and are saline locked. Pt also has 22g to R upper arm and 20g to LAC. Both are saline locked. No edema noted to bilateral lowe extremities. Pt has good ROM to upper and lower bilateral extremities. Pt has scattered bruising to bilateral upper extremities. Foam drsg in place to skin tear on left forearm. Mepilex drg to sacrum area for preventive measures.   Bed alarm on for pt safety and bed in lowest position.

## 2022-02-27 NOTE — PLAN OF CARE
Problem: Fall Injury Risk  Goal: Absence of Fall and Fall-Related Injury  Outcome: Ongoing, Progressing  Intervention: Identify and Manage Contributors  Flowsheets (Taken 2/27/2022 1246)  Self-Care Promotion:   meal set-up provided   BADL personal objects within reach  Medication Review/Management:   medications reviewed   high-risk medications identified  Intervention: Promote Injury-Free Environment  Flowsheets (Taken 2/27/2022 1246)  Safety Promotion/Fall Prevention:   assistive device/personal item within reach   bed alarm set   /camera at bedside   high risk medications identified   nonskid shoes/socks when out of bed

## 2022-02-27 NOTE — NURSING
At approx. 0240 the monitor tech reported to this nurse a rhythm change from NSR to A-Flutter. 12 Lead EKG done. Resulting with the following: NSR with incomplete RBBB. ST  And T wave abnormality, consider lateral ischemia with prolonged QT. These results were reported to Dr. Severino. New orders for one time dose of lopressor 12.5mg po given.

## 2022-02-27 NOTE — PROGRESS NOTES
02/26/22 2129   Patient Belongings Sent Home   Belongings Sent Home Clothing;Other valuables   Clothing Footwear;Pants;Underpants   Other Valuables Money (Comment);Keys;Other (Comment)  ($26.00& ID & INSURANCE CARD -)   Sent Home With Wife     Sent with store cards, no bank nor credit cards seen. Per wife patient did not come to hospital with any. Replacement ordered by mail & at home

## 2022-02-27 NOTE — NURSING TRANSFER
Nursing Transfer Note      2/26/2022     Reason patient is being transferred: Downgrade to TELE    Transfer To: Room 305    Transfer via bed    Transfer with 2 liters O2 via nasal cannula; cardiac monitoring    Transported by transporter, RN    Medicines sent: none    Any special needs or follow-up needed:none    Chart send with patient: Yes    Notified: Spouse @ [phone]     Upon arrival to floor: cardiac monitor applied, patient oriented to room, call bell in reach and bed in lowest position

## 2022-02-27 NOTE — PROGRESS NOTES
[facility] - Telemetry  Cardiology  Progress Note    Patient Name: Kashif Iverson  MRN: [id number]  Admission Date: 2/22/2022  Hospital Length of Stay: 5 days  Code Status: Full Code   Attending Physician: Abbey Conti MD   Primary Care Physician: Eduardo Ruiz MD  Expected Discharge Date:   Principal Problem:Acute respiratory failure with hypoxia and hypercapnia    Subjective:           Interval History:  Doing fine.  No chest pain.  Breathing better.  Talked to wife also today who was in the room with the patient    Review of Systems   Constitutional: Positive for malaise/fatigue.   HENT: Negative.     Eyes: Negative.    Cardiovascular: Negative.    Respiratory: Negative.     Endocrine: Negative.    Hematologic/Lymphatic: Negative.    Skin: Negative.    Musculoskeletal: Negative.    Gastrointestinal: Negative.    Genitourinary: Negative.    Neurological: Negative.    Psychiatric/Behavioral: Negative.     Allergic/Immunologic: Negative.    Objective:     Vital Signs (Most Recent):  Temp: 98.3 °F (36.8 °C) (02/27/22 1154)  Pulse: 70 (02/27/22 1320)  Resp: 15 (02/27/22 1320)  BP: 130/76 (02/27/22 1154)  SpO2: 95 % (02/27/22 1320) Vital Signs (24h Range):  Temp:  [98.1 °F (36.7 °C)-98.7 °F (37.1 °C)] 98.3 °F (36.8 °C)  Pulse:  [] 70  Resp:  [15-63] 15  SpO2:  [91 %-100 %] 95 %  BP: (125-192)/(66-98) 130/76     Weight: 99.8 kg (220 lb 0.3 oz)  Body mass index is 31.57 kg/m².     SpO2: 95 %  O2 Device (Oxygen Therapy): room air      Intake/Output Summary (Last 24 hours) at 2/27/2022 1547  Last data filed at 2/27/2022 1246  Gross per 24 hour   Intake 1820 ml   Output 2800 ml   Net -980 ml       Lines/Drains/Airways       Peripherally Inserted Central Catheter Line  Duration             PICC Triple Lumen 02/22/22 2020 right brachial 4 days              Central Venous Catheter Line  Duration             Port A Cath Single Lumen right subclavian -- days              Drain  Duration                  Urethral Catheter  02/22/22 1530 Straight-tip 16 Fr. 5 days              Peripheral Intravenous Line  Duration                  Peripheral IV - Single Lumen 02/22/22 1400 20 G Anterior;Distal;Left Upper Arm 5 days         Peripheral IV - Single Lumen 02/22/22 1737 22 G Right Upper Arm 4 days                    Physical Exam  Vitals reviewed.   Constitutional:       Appearance: He is well-developed.   HENT:      Head: Normocephalic.   Eyes:      Conjunctiva/sclera: Conjunctivae normal.      Pupils: Pupils are equal, round, and reactive to light.   Cardiovascular:      Rate and Rhythm: Normal rate and regular rhythm.      Heart sounds: Normal heart sounds.   Pulmonary:      Effort: Pulmonary effort is normal.      Breath sounds: Normal breath sounds.   Abdominal:      General: Bowel sounds are normal.      Palpations: Abdomen is soft.   Musculoskeletal:      Cervical back: Normal range of motion and neck supple.   Skin:     General: Skin is warm.   Neurological:      Mental Status: He is alert and oriented to person, place, and time.       Significant Labs: BMP:   Recent Labs   Lab 02/26/22  0542 02/27/22  0441   * 100    144   K 3.6 3.2*    99   CO2 32* 33*   BUN 12 20   CREATININE 1.1 1.3   CALCIUM 8.8 9.4   , CMP   Recent Labs   Lab 02/26/22  0542 02/27/22  0441    144   K 3.6 3.2*    99   CO2 32* 33*   * 100   BUN 12 20   CREATININE 1.1 1.3   CALCIUM 8.8 9.4   PROT 6.7 7.6   ALBUMIN 2.9* 3.4*   BILITOT 0.7 0.8   ALKPHOS 63 75   AST 21 42*   ALT 18 31   ANIONGAP 10 12   ESTGFRAFRICA >60 >60   EGFRNONAA >60 59*   , CBC   Recent Labs   Lab 02/26/22  0542 02/27/22  0441   WBC 9.43 13.83*   HGB 10.6* 12.3*   HCT 32.1* 38.4*    262   , INR No results for input(s): INR, PROTIME in the last 48 hours., Lipid Panel No results for input(s): CHOL, HDL, LDLCALC, TRIG, CHOLHDL in the last 48 hours., Troponin No results for input(s): TROPONINI in the last 48 hours., and All pertinent lab results from  the last 24 hours have been reviewed.    Significant Imaging: Echocardiogram: Transthoracic echo (TTE) complete (Cupid Only):   Results for orders placed or performed during the hospital encounter of 02/22/22   Echo   Result Value Ref Range    BSA 2.29 m2    TDI SEPTAL 0.04 m/s    LV LATERAL E/E' RATIO 7.29 m/s    LV SEPTAL E/E' RATIO 12.75 m/s    LA WIDTH 4.99 cm    AORTIC VALVE CUSP SEPERATION 2.31 cm    TDI LATERAL 0.07 m/s    PV PEAK VELOCITY 0.75 cm/s    LVIDd 4.49 3.5 - 6.0 cm    IVS 1.33 (A) 0.6 - 1.1 cm    Posterior Wall 1.34 (A) 0.6 - 1.1 cm    Ao root annulus 3.92 cm    LVIDs 3.80 2.1 - 4.0 cm    FS 15 28 - 44 %    LA volume 111.72 cm3    Sinus 3.83 cm    STJ 2.92 cm    Ascending aorta 3.08 cm    LV mass 230.68 g    LA size 4.42 cm    RVDD 4.46 cm    TAPSE 1.56 cm    RV S' 9.12 cm/s    Left Ventricle Relative Wall Thickness 0.60 cm    AV mean gradient 3 mmHg    AV valve area 2.95 cm2    AV Velocity Ratio 0.63     AV index (prosthetic) 0.66     MV valve area p 1/2 method 3.41 cm2    E/A ratio 1.21     Mean e' 0.06 m/s    E wave deceleration time 222.66 msec    IVRT 283.74 msec    Pulm vein S/D ratio 1.04     LVOT diameter 2.39 cm    LVOT area 4.5 cm2    LVOT peak luigi 0.70 m/s    LVOT peak VTI 13.85 cm    Ao peak luigi 1.11 m/s    Ao VTI 21.02 cm    LVOT stroke volume 62.10 cm3    AV peak gradient 5 mmHg    E/E' ratio 9.27 m/s    MV Peak E Luigi 0.51 m/s    TR Max Luigi 2.33 m/s    MV stenosis pressure 1/2 time 64.57 ms    MV Peak A Luigi 0.42 m/s    PV Peak S Luigi 0.29 m/s    PV Peak D Luigi 0.28 m/s    LV Systolic Volume 61.96 mL    LV Systolic Volume Index 28.6 mL/m2    LV Diastolic Volume 92.20 mL    LV Diastolic Volume Index 42.49 mL/m2    LA Volume Index 51.5 mL/m2    LV Mass Index 106 g/m2    RA Major Axis 5.73 cm    Left Atrium Minor Axis 6.03 cm    Left Atrium Major Axis 5.89 cm    Triscuspid Valve Regurgitation Peak Gradient 22 mmHg    RA Width 4.32 cm    EF 35 %    Narrative    · The left ventricle is normal  in size with concentric hypertrophy and   moderately decreased systolic function.  · The estimated ejection fraction is 35-40%.  · Grade I left ventricular diastolic dysfunction.  · Mild right ventricular enlargement with normal right ventricular   systolic function.  · Mild left atrial enlargement.  · Mild right atrial enlargement.  · Mild tricuspid regurgitation.        Assessment and Plan:     Brief HPI:     * Acute respiratory failure with hypoxia and hypercapnia  Extubated today.    Multifocal atrial tachycardia  New Dx. Related to underlying pulmonary issues. Now resolved    High anion gap metabolic acidosis  Being treated for possible DKA    NSTEMI (non-ST elevated myocardial infarction)  Peak troponin 1.7. Suspect demand ischemia from tachycardia, respiratory distress and acidosis. Echo with EF 35-40% ? Tachymyopathy. Will discuss ischemic evaluation when more stable.    Discussed with patient and wife today.  They would like to discuss with the oncologist before making a decision about coronary angiogram.        Lung cancer, main bronchus, left  Per primary        VTE Risk Mitigation (From admission, onward)         Ordered     enoxaparin injection 40 mg  Daily         02/24/22 5163                Catia Roman MD  Cardiology  [facility] - Telemetry

## 2022-02-27 NOTE — ASSESSMENT & PLAN NOTE
Peak troponin 1.7. Suspect demand ischemia from tachycardia, respiratory distress and acidosis. Echo with EF 35-40% ? Tachymyopathy. Will discuss ischemic evaluation when more stable.    Discussed with patient and wife today.  They would like to discuss with the oncologist before making a decision about coronary angiogram.

## 2022-02-27 NOTE — SUBJECTIVE & OBJECTIVE
Interval History: stable at room air.     Review of Systems   Constitutional: Negative.    Respiratory: Negative.     Cardiovascular: Negative.    Gastrointestinal: Negative.    Objective:     Vital Signs (Most Recent):  Temp: 97.9 °F (36.6 °C) (02/27/22 1635)  Pulse: 90 (02/27/22 1635)  Resp: 18 (02/27/22 1635)  BP: 132/83 (02/27/22 1635)  SpO2: 95 % (02/27/22 1635)   Vital Signs (24h Range):  Temp:  [97.9 °F (36.6 °C)-98.7 °F (37.1 °C)] 97.9 °F (36.6 °C)  Pulse:  [] 90  Resp:  [15-51] 18  SpO2:  [91 %-100 %] 95 %  BP: (125-192)/(66-98) 132/83     Weight: 99.8 kg (220 lb 0.3 oz)  Body mass index is 31.57 kg/m².    Intake/Output Summary (Last 24 hours) at 2/27/2022 1654  Last data filed at 2/27/2022 1246  Gross per 24 hour   Intake 1820 ml   Output 2800 ml   Net -980 ml        Physical Exam  Vitals and nursing note reviewed.   Constitutional:       General: He is not in acute distress.     Appearance: He is not toxic-appearing.   Cardiovascular:      Rate and Rhythm: Normal rate and regular rhythm.   Pulmonary:      Effort: Pulmonary effort is normal.      Breath sounds: No wheezing or rales.   Abdominal:      Palpations: Abdomen is soft.   Musculoskeletal:      Right lower leg: No edema.      Left lower leg: No edema.   Skin:     General: Skin is warm.      Capillary Refill: Capillary refill takes less than 2 seconds.   Neurological:      Mental Status: He is oriented to person, place, and time.   Psychiatric:         Mood and Affect: Mood normal.         Behavior: Behavior normal.       Significant Labs: All pertinent labs within the past 24 hours have been reviewed.    Significant Imaging: I have reviewed all pertinent imaging results/findings within the past 24 hours.  I have reviewed and interpreted all pertinent imaging results/findings within the past 24 hours.

## 2022-02-27 NOTE — PLAN OF CARE
Problem: Infection  Goal: Absence of Infection Signs and Symptoms  Outcome: Ongoing, Progressing     Problem: Fall Injury Risk  Goal: Absence of Fall and Fall-Related Injury  Outcome: Ongoing, Progressing     Problem: Coping Ineffective  Goal: Effective Coping  Outcome: Ongoing, Progressing     Problem: Adult Inpatient Plan of Care  Goal: Plan of Care Review  Outcome: Ongoing, Progressing  Goal: Patient-Specific Goal (Individualized)  Outcome: Ongoing, Progressing  Goal: Absence of Hospital-Acquired Illness or Injury  Outcome: Ongoing, Progressing  Goal: Optimal Comfort and Wellbeing  Outcome: Ongoing, Progressing  Goal: Readiness for Transition of Care  Outcome: Ongoing, Progressing     Problem: Skin Injury Risk Increased  Goal: Skin Health and Integrity  Outcome: Ongoing, Progressing     Problem: Impaired Wound Healing  Goal: Optimal Wound Healing  Outcome: Ongoing, Progressing

## 2022-02-28 LAB
ALBUMIN SERPL BCP-MCNC: 3.4 G/DL (ref 3.5–5.2)
ALP SERPL-CCNC: 73 U/L (ref 55–135)
ALT SERPL W/O P-5'-P-CCNC: 32 U/L (ref 10–44)
ANION GAP SERPL CALC-SCNC: 12 MMOL/L (ref 8–16)
AST SERPL-CCNC: 35 U/L (ref 10–40)
BASOPHILS # BLD AUTO: 0.02 K/UL (ref 0–0.2)
BASOPHILS NFR BLD: 0.2 % (ref 0–1.9)
BILIRUB SERPL-MCNC: 0.8 MG/DL (ref 0.1–1)
BUN SERPL-MCNC: 20 MG/DL (ref 8–23)
CALCIUM SERPL-MCNC: 9 MG/DL (ref 8.7–10.5)
CHLORIDE SERPL-SCNC: 97 MMOL/L (ref 95–110)
CO2 SERPL-SCNC: 33 MMOL/L (ref 23–29)
CREAT SERPL-MCNC: 1.1 MG/DL (ref 0.5–1.4)
DIFFERENTIAL METHOD: ABNORMAL
EOSINOPHIL # BLD AUTO: 0.1 K/UL (ref 0–0.5)
EOSINOPHIL NFR BLD: 0.5 % (ref 0–8)
ERYTHROCYTE [DISTWIDTH] IN BLOOD BY AUTOMATED COUNT: 16.2 % (ref 11.5–14.5)
EST. GFR  (AFRICAN AMERICAN): >60 ML/MIN/1.73 M^2
EST. GFR  (NON AFRICAN AMERICAN): >60 ML/MIN/1.73 M^2
GLUCOSE SERPL-MCNC: 98 MG/DL (ref 70–110)
HCT VFR BLD AUTO: 40.4 % (ref 40–54)
HGB BLD-MCNC: 13 G/DL (ref 14–18)
IMM GRANULOCYTES # BLD AUTO: 0.07 K/UL (ref 0–0.04)
IMM GRANULOCYTES NFR BLD AUTO: 0.6 % (ref 0–0.5)
LYMPHOCYTES # BLD AUTO: 2.5 K/UL (ref 1–4.8)
LYMPHOCYTES NFR BLD: 19.8 % (ref 18–48)
MAGNESIUM SERPL-MCNC: 1.7 MG/DL (ref 1.6–2.6)
MCH RBC QN AUTO: 31.3 PG (ref 27–31)
MCHC RBC AUTO-ENTMCNC: 32.2 G/DL (ref 32–36)
MCV RBC AUTO: 97 FL (ref 82–98)
MONOCYTES # BLD AUTO: 2.3 K/UL (ref 0.3–1)
MONOCYTES NFR BLD: 17.9 % (ref 4–15)
NEUTROPHILS # BLD AUTO: 7.7 K/UL (ref 1.8–7.7)
NEUTROPHILS NFR BLD: 61 % (ref 38–73)
NRBC BLD-RTO: 0 /100 WBC
PHOSPHATE SERPL-MCNC: 3 MG/DL (ref 2.7–4.5)
PLATELET # BLD AUTO: 276 K/UL (ref 150–450)
PMV BLD AUTO: 10.3 FL (ref 9.2–12.9)
POCT GLUCOSE: 115 MG/DL (ref 70–110)
POCT GLUCOSE: 119 MG/DL (ref 70–110)
POTASSIUM SERPL-SCNC: 2.8 MMOL/L (ref 3.5–5.1)
POTASSIUM SERPL-SCNC: 2.9 MMOL/L (ref 3.5–5.1)
PROT SERPL-MCNC: 7.5 G/DL (ref 6–8.4)
RBC # BLD AUTO: 4.16 M/UL (ref 4.6–6.2)
SODIUM SERPL-SCNC: 142 MMOL/L (ref 136–145)
WBC # BLD AUTO: 12.56 K/UL (ref 3.9–12.7)

## 2022-02-28 PROCEDURE — 84132 ASSAY OF SERUM POTASSIUM: CPT | Performed by: INTERNAL MEDICINE

## 2022-02-28 PROCEDURE — 36415 COLL VENOUS BLD VENIPUNCTURE: CPT | Performed by: INTERNAL MEDICINE

## 2022-02-28 PROCEDURE — A4216 STERILE WATER/SALINE, 10 ML: HCPCS | Performed by: INTERNAL MEDICINE

## 2022-02-28 PROCEDURE — 21400001 HC TELEMETRY ROOM

## 2022-02-28 PROCEDURE — 85025 COMPLETE CBC W/AUTO DIFF WBC: CPT | Performed by: INTERNAL MEDICINE

## 2022-02-28 PROCEDURE — 83735 ASSAY OF MAGNESIUM: CPT | Performed by: INTERNAL MEDICINE

## 2022-02-28 PROCEDURE — 93010 EKG 12-LEAD: ICD-10-PCS | Mod: ,,, | Performed by: INTERNAL MEDICINE

## 2022-02-28 PROCEDURE — 63600175 PHARM REV CODE 636 W HCPCS: Performed by: INTERNAL MEDICINE

## 2022-02-28 PROCEDURE — 93005 ELECTROCARDIOGRAM TRACING: CPT

## 2022-02-28 PROCEDURE — 80053 COMPREHEN METABOLIC PANEL: CPT | Performed by: INTERNAL MEDICINE

## 2022-02-28 PROCEDURE — 25000003 PHARM REV CODE 250: Performed by: STUDENT IN AN ORGANIZED HEALTH CARE EDUCATION/TRAINING PROGRAM

## 2022-02-28 PROCEDURE — 93010 ELECTROCARDIOGRAM REPORT: CPT | Mod: ,,, | Performed by: INTERNAL MEDICINE

## 2022-02-28 PROCEDURE — 99233 SBSQ HOSP IP/OBS HIGH 50: CPT | Mod: ,,, | Performed by: INTERNAL MEDICINE

## 2022-02-28 PROCEDURE — 25000242 PHARM REV CODE 250 ALT 637 W/ HCPCS: Performed by: INTERNAL MEDICINE

## 2022-02-28 PROCEDURE — 84100 ASSAY OF PHOSPHORUS: CPT | Performed by: INTERNAL MEDICINE

## 2022-02-28 PROCEDURE — 99233 PR SUBSEQUENT HOSPITAL CARE,LEVL III: ICD-10-PCS | Mod: ,,, | Performed by: INTERNAL MEDICINE

## 2022-02-28 PROCEDURE — 94640 AIRWAY INHALATION TREATMENT: CPT

## 2022-02-28 PROCEDURE — 25000003 PHARM REV CODE 250: Performed by: INTERNAL MEDICINE

## 2022-02-28 RX ORDER — HYDRALAZINE HYDROCHLORIDE 20 MG/ML
10 INJECTION INTRAMUSCULAR; INTRAVENOUS ONCE
Status: DISCONTINUED | OUTPATIENT
Start: 2022-02-28 | End: 2022-03-01

## 2022-02-28 RX ORDER — LANOLIN ALCOHOL/MO/W.PET/CERES
400 CREAM (GRAM) TOPICAL DAILY
Status: DISCONTINUED | OUTPATIENT
Start: 2022-02-28 | End: 2022-03-05 | Stop reason: HOSPADM

## 2022-02-28 RX ORDER — POTASSIUM CHLORIDE 14.9 MG/ML
20 INJECTION INTRAVENOUS
Status: COMPLETED | OUTPATIENT
Start: 2022-02-28 | End: 2022-02-28

## 2022-02-28 RX ORDER — FUROSEMIDE 40 MG/1
40 TABLET ORAL DAILY
Status: DISCONTINUED | OUTPATIENT
Start: 2022-03-01 | End: 2022-03-05 | Stop reason: HOSPADM

## 2022-02-28 RX ORDER — POTASSIUM CHLORIDE 20 MEQ/1
40 TABLET, EXTENDED RELEASE ORAL ONCE
Status: COMPLETED | OUTPATIENT
Start: 2022-02-28 | End: 2022-02-28

## 2022-02-28 RX ADMIN — Medication 10 ML: at 06:02

## 2022-02-28 RX ADMIN — ATORVASTATIN CALCIUM 80 MG: 40 TABLET, FILM COATED ORAL at 09:02

## 2022-02-28 RX ADMIN — IPRATROPIUM BROMIDE AND ALBUTEROL SULFATE 3 ML: 2.5; .5 SOLUTION RESPIRATORY (INHALATION) at 06:02

## 2022-02-28 RX ADMIN — POTASSIUM CHLORIDE 40 MEQ: 1500 TABLET, EXTENDED RELEASE ORAL at 06:02

## 2022-02-28 RX ADMIN — CLOPIDOGREL 75 MG: 75 TABLET, FILM COATED ORAL at 09:02

## 2022-02-28 RX ADMIN — Medication 10 ML: at 12:02

## 2022-02-28 RX ADMIN — ASPIRIN 81 MG CHEWABLE TABLET 81 MG: 81 TABLET CHEWABLE at 09:02

## 2022-02-28 RX ADMIN — POTASSIUM CHLORIDE 20 MEQ: 200 INJECTION, SOLUTION INTRAVENOUS at 10:02

## 2022-02-28 RX ADMIN — POTASSIUM CHLORIDE 20 MEQ: 200 INJECTION, SOLUTION INTRAVENOUS at 08:02

## 2022-02-28 RX ADMIN — Medication 400 MG: at 06:02

## 2022-02-28 RX ADMIN — ENOXAPARIN SODIUM 40 MG: 40 INJECTION SUBCUTANEOUS at 06:02

## 2022-02-28 RX ADMIN — FUROSEMIDE 60 MG: 10 INJECTION, SOLUTION INTRAMUSCULAR; INTRAVENOUS at 05:02

## 2022-02-28 RX ADMIN — PREDNISONE 25 MG: 5 TABLET ORAL at 09:02

## 2022-02-28 NOTE — ASSESSMENT & PLAN NOTE
Peak troponin 1.7. Suspect demand ischemia from tachycardia, respiratory distress and acidosis. Echo with EF 35-40% ? Tachymyopathy. Will discuss ischemic evaluation when more stable.    Discussed with patient and wife.  They would like to discuss with the oncologist before making a decision about coronary angiogram.

## 2022-02-28 NOTE — PROGRESS NOTES
[facility]  Cardiology  Progress Note    Patient Name: Kashif Iverson  MRN: [id number]  Admission Date: 2/22/2022  Hospital Length of Stay: 6 days  Code Status: Full Code   Attending Physician: Abbey Conti MD   Primary Care Physician: Eduardo Ruiz MD  Expected Discharge Date:   Principal Problem:Acute respiratory failure with hypoxia and hypercapnia    Subjective:     Hospital Course:   2/24/22 Extubated yesterday re-intubated overnight. Currently NSR. BP low but stable    Echo 2/23/22  · The left ventricle is normal in size with concentric hypertrophy and moderately decreased systolic function.  · The estimated ejection fraction is 35-40%.  · Grade I left ventricular diastolic dysfunction.  · Mild right ventricular enlargement with normal right ventricular systolic function.  · Mild left atrial enlargement.  · Mild right atrial enlargement.  · Mild tricuspid regurgitation.        Interval History:  Continues to be chest pain free.    Review of Systems   Constitutional: Positive for malaise/fatigue.   HENT: Negative.     Eyes: Negative.    Cardiovascular: Negative.    Respiratory: Negative.     Endocrine: Negative.    Hematologic/Lymphatic: Negative.    Skin: Negative.    Musculoskeletal: Negative.    Gastrointestinal: Negative.    Genitourinary: Negative.    Neurological: Negative.    Psychiatric/Behavioral: Negative.     Allergic/Immunologic: Negative.    Objective:     Vital Signs (Most Recent):  Temp: 99.4 °F (37.4 °C) (02/28/22 1114)  Pulse: 90 (02/28/22 1114)  Resp: 18 (02/28/22 1114)  BP: (!) 141/86 (02/28/22 1114)  SpO2: 97 % (02/28/22 1114)   Vital Signs (24h Range):  Temp:  [97.9 °F (36.6 °C)-99.4 °F (37.4 °C)] 99.4 °F (37.4 °C)  Pulse:  [78-96] 90  Resp:  [16-20] 18  SpO2:  [94 %-98 %] 97 %  BP: (124-156)/(72-97) 141/86     Weight: 86.9 kg (191 lb 9.3 oz)  Body mass index is 27.49 kg/m².     SpO2: 97 %  O2 Device (Oxygen Therapy): room air      Intake/Output Summary (Last 24 hours) at  2/28/2022 1403  Last data filed at 2/28/2022 1200  Gross per 24 hour   Intake 1160 ml   Output 3400 ml   Net -2240 ml       Lines/Drains/Airways       Peripherally Inserted Central Catheter Line  Duration             PICC Triple Lumen 02/22/22 2020 right brachial 5 days              Central Venous Catheter Line  Duration             Port A Cath Single Lumen right subclavian -- days              Drain  Duration                  Urethral Catheter 02/22/22 1530 Straight-tip 16 Fr. 5 days                    Physical Exam  Vitals reviewed.   Constitutional:       Appearance: He is well-developed.   HENT:      Head: Normocephalic.   Eyes:      Conjunctiva/sclera: Conjunctivae normal.      Pupils: Pupils are equal, round, and reactive to light.   Cardiovascular:      Rate and Rhythm: Normal rate and regular rhythm.      Heart sounds: Normal heart sounds.   Pulmonary:      Effort: Pulmonary effort is normal.      Breath sounds: Normal breath sounds.   Abdominal:      General: Bowel sounds are normal.      Palpations: Abdomen is soft.   Musculoskeletal:      Cervical back: Normal range of motion and neck supple.   Skin:     General: Skin is warm.   Neurological:      Mental Status: He is alert and oriented to person, place, and time.       Significant Labs: BMP:   Recent Labs   Lab 02/27/22  0441 02/28/22  0442 02/28/22  1030    98  --     142  --    K 3.2* 2.8* 2.9*   CL 99 97  --    CO2 33* 33*  --    BUN 20 20  --    CREATININE 1.3 1.1  --    CALCIUM 9.4 9.0  --    MG  --   --  1.7   , CMP   Recent Labs   Lab 02/27/22  0441 02/28/22  0442 02/28/22  1030    142  --    K 3.2* 2.8* 2.9*   CL 99 97  --    CO2 33* 33*  --     98  --    BUN 20 20  --    CREATININE 1.3 1.1  --    CALCIUM 9.4 9.0  --    PROT 7.6 7.5  --    ALBUMIN 3.4* 3.4*  --    BILITOT 0.8 0.8  --    ALKPHOS 75 73  --    AST 42* 35  --    ALT 31 32  --    ANIONGAP 12 12  --    ESTGFRAFRICA >60 >60  --    EGFRNONAA 59* >60  --    , CBC    Recent Labs   Lab 02/27/22  0441 02/28/22  0442   WBC 13.83* 12.56   HGB 12.3* 13.0*   HCT 38.4* 40.4    276   , INR No results for input(s): INR, PROTIME in the last 48 hours., Lipid Panel No results for input(s): CHOL, HDL, LDLCALC, TRIG, CHOLHDL in the last 48 hours., Troponin No results for input(s): TROPONINI in the last 48 hours., and All pertinent lab results from the last 24 hours have been reviewed.    Significant Imaging: Echocardiogram: Transthoracic echo (TTE) complete (Cupid Only):   Results for orders placed or performed during the hospital encounter of 02/22/22   Echo   Result Value Ref Range    BSA 2.29 m2    TDI SEPTAL 0.04 m/s    LV LATERAL E/E' RATIO 7.29 m/s    LV SEPTAL E/E' RATIO 12.75 m/s    LA WIDTH 4.99 cm    AORTIC VALVE CUSP SEPERATION 2.31 cm    TDI LATERAL 0.07 m/s    PV PEAK VELOCITY 0.75 cm/s    LVIDd 4.49 3.5 - 6.0 cm    IVS 1.33 (A) 0.6 - 1.1 cm    Posterior Wall 1.34 (A) 0.6 - 1.1 cm    Ao root annulus 3.92 cm    LVIDs 3.80 2.1 - 4.0 cm    FS 15 28 - 44 %    LA volume 111.72 cm3    Sinus 3.83 cm    STJ 2.92 cm    Ascending aorta 3.08 cm    LV mass 230.68 g    LA size 4.42 cm    RVDD 4.46 cm    TAPSE 1.56 cm    RV S' 9.12 cm/s    Left Ventricle Relative Wall Thickness 0.60 cm    AV mean gradient 3 mmHg    AV valve area 2.95 cm2    AV Velocity Ratio 0.63     AV index (prosthetic) 0.66     MV valve area p 1/2 method 3.41 cm2    E/A ratio 1.21     Mean e' 0.06 m/s    E wave deceleration time 222.66 msec    IVRT 283.74 msec    Pulm vein S/D ratio 1.04     LVOT diameter 2.39 cm    LVOT area 4.5 cm2    LVOT peak luigi 0.70 m/s    LVOT peak VTI 13.85 cm    Ao peak luigi 1.11 m/s    Ao VTI 21.02 cm    LVOT stroke volume 62.10 cm3    AV peak gradient 5 mmHg    E/E' ratio 9.27 m/s    MV Peak E Luigi 0.51 m/s    TR Max Luigi 2.33 m/s    MV stenosis pressure 1/2 time 64.57 ms    MV Peak A Luigi 0.42 m/s    PV Peak S Luigi 0.29 m/s    PV Peak D Luigi 0.28 m/s    LV Systolic Volume 61.96 mL    LV  Systolic Volume Index 28.6 mL/m2    LV Diastolic Volume 92.20 mL    LV Diastolic Volume Index 42.49 mL/m2    LA Volume Index 51.5 mL/m2    LV Mass Index 106 g/m2    RA Major Axis 5.73 cm    Left Atrium Minor Axis 6.03 cm    Left Atrium Major Axis 5.89 cm    Triscuspid Valve Regurgitation Peak Gradient 22 mmHg    RA Width 4.32 cm    EF 35 %    Narrative    · The left ventricle is normal in size with concentric hypertrophy and   moderately decreased systolic function.  · The estimated ejection fraction is 35-40%.  · Grade I left ventricular diastolic dysfunction.  · Mild right ventricular enlargement with normal right ventricular   systolic function.  · Mild left atrial enlargement.  · Mild right atrial enlargement.  · Mild tricuspid regurgitation.        Assessment and Plan:     Brief HPI:     * Acute respiratory failure with hypoxia and hypercapnia  Extubated today.    Multifocal atrial tachycardia  New Dx. Related to underlying pulmonary issues. Now resolved    High anion gap metabolic acidosis  Being treated for possible DKA    NSTEMI (non-ST elevated myocardial infarction)  Peak troponin 1.7. Suspect demand ischemia from tachycardia, respiratory distress and acidosis. Echo with EF 35-40% ? Tachymyopathy. Will discuss ischemic evaluation when more stable.    Discussed with patient and wife.  They would like to discuss with the oncologist before making a decision about coronary angiogram.        Lung cancer, main bronchus, left  Per primary    Former smoker            VTE Risk Mitigation (From admission, onward)         Ordered     enoxaparin injection 40 mg  Daily         02/24/22 7128                Catia Roman MD  Cardiology  [facility]

## 2022-02-28 NOTE — PT/OT/SLP PROGRESS
Occupational Therapy      Patient Name:  Kashif Iverson   MRN:  [id number]    Patient not seen today secondary to hold per nurse; pt currently tachy w/ HR as high as 150 bpm at rest. Will follow-up as appropriate.     2/28/2022

## 2022-02-28 NOTE — PT/OT/SLP PROGRESS
Physical Therapy      Patient Name:  Kashif Iverson   MRN:  [id number]    Patient not seen today secondary to MD hold (Comment) (elevated hr per nurse, Rosemary). Will follow-up tomorrow.

## 2022-02-28 NOTE — ASSESSMENT & PLAN NOTE
Patient with Hypercapnic and Hypoxic Respiratory failure which is Acute.  he is not on home oxygen. Supplemental oxygen was provided and noted-  .   Signs/symptoms of respiratory failure include- respiratory distress. Contributing diagnoses includes - I suspect cardiogenic pulmonary edema. I also suspect tachypnea to compensate for acidemia Labs and images were reviewed. Patient Has recent ABG, which has been reviewed. Will treat underlying causes and adjust management of respiratory failure as follows   Based on workup thus far, acuity of condition is likely due to cardiogenic edema given new diagnosis of heart failure. Is on furosemide. Stable at room air.

## 2022-02-28 NOTE — DISCHARGE INSTRUCTIONS
Flexible Bronchoscopy  A flexible bronchoscopy is an exam of the airways of your lungs. A thin, flexible tube called a bronchoscope is used. It has a light and small camera that allow the healthcare provider to view your airways.    Before your test  · Follow your healthcare provider's instructions carefully. If you dont, the exam may be canceled. Or you may need to take it again.  · If you are taking blood-thinning medicine, ask your healthcare provider if you should stop taking the medicine before this test.  · Have no food or drink for at least 8 hours before the test. Also, avoid smoking for 24 hours before the test.  · You will need to remove any dentures or removable devices from your mouth.  · Right before the test, you will be given sedating medicines to help you relax. The medicine may be given by an IV (intravenously) into one of your veins. In addition, your nose and throat may be numbed with a special spray to help prevent gagging and coughing.  · If you are having this test as an outpatient, make sure you have an adult friend or family member to drive you home.  During your test  Bronchoscopy takes 45 to 60 minutes and includes the following steps:  · You may be given medicine (anesthesia) so that you are unconscious or asleep during the procedure.  · The healthcare provider inserts the tube into your nose or mouth.  · If you have not been given anesthesia, you might feel a gagging sensation. To help ease this feeling, you will be told to swallow or take deep breaths. Your airway will remain open even with the tube in place. But you wont be able to talk.  · The provider checks your breathing passage. He or she may also remove tiny tissue samples for biopsy.  After your test  · You may have a mild sore throat or cough. Your voice may also be hoarse.  · Don't eat or drink until the anesthesia wears off.  · If you had a biopsy, you might see traces of blood being coughed up.  When to call your  This patient has been assessed with a concern for Malnutrition and has been determined to have a diagnosis/diagnoses of Severe protein-calorie malnutrition.    This patient is being managed with:   Diet NPO with Tube Feed-  Tube Feeding Modality: Gastrostomy  2Kal HN  Total Volume for 24 Hours (mL): 840  Continuous  Starting Tube Feed Rate {mL per Hour}: 10  Increase Tube Feed Rate by (mL): 10     Every 3 hours  Until Goal Tube Feed Rate (mL per Hour): 35  Tube Feed Duration (in Hours): 24  Tube Feed Start Time: 19:00  No Carb Prosource TF     Qty per Day:  1  Entered: Feb 25 2022  6:40PM    Diet NPO with Tube Feed-  Tube Feeding Modality: Gastrostomy  Glucerna 1.5 Dhruv  Total Volume for 24 Hours (mL): 960  Continuous  Starting Tube Feed Rate {mL per Hour}: 30  Increase Tube Feed Rate by (mL): 10     Every 6 hours  Until Goal Tube Feed Rate (mL per Hour): 40  Tube Feed Duration (in Hours): 24  Tube Feed Start Time: 11:00  No Carb Prosource TF     Qty per Day:  1 pack /day  Entered: Feb 22 2022 10:57AM    The following pending diet order is being considered for treatment of Severe protein-calorie malnutrition:null healthcare provider  Call your healthcare provider right away if you have any of the following:  · Shortness of breath  · Chest pain  · Bleeding from your nose or throat  · Coughing up a large amount of blood  · A fever above 100.4°F (38°C) for more than 24 hours  Call 911  Call 911 if you have:  · Chest pain  · Severe shortness of breath   Date Last Reviewed: 12/1/2016 © 2000-2017 [employer]. [address] [place] [address], [place] [id number]. All rights reserved. This information is not intended as a substitute for professional medical care. Always follow your healthcare professional's instructions.      Recovery After Procedural Sedation (Adult)  You have been given medicine by vein to make you sleep during your surgery. This may have included both a pain medicine and sleeping medicine. Most of the effects have worn off. But you may still have some drowsiness for the next 6 to 8 hours.  Home care  Follow these guidelines when you get home:  · For the next 8 hours, you should be watched by a responsible adult. This person should make sure your condition is not getting worse.  · Don't drink any alcohol for the next 24 hours.  · Don't drive, operate dangerous machinery, or make important business or personal decisions during the next 24 hours.  Note: Your healthcare provider may tell you not to take any medicine by mouth for pain or sleep in the next 4 hours. These medicines may react with the medicines you were given in the hospital. This could cause a much stronger response than usual.  Follow-up care  Follow up with your healthcare provider if you are not alert and back to your usual level of activity within 12 hours.  When to seek medical advice  Call your healthcare provider right away if any of these occur:  · Drowsiness gets worse  · Weakness or dizziness gets worse  · Repeated vomiting  · You can't be awakened   Date Last Reviewed: 10/18/2016  © 1644-0600 [employer]. [address] [place] [address]  Road, [place] [id number]. All rights reserved. This information is not intended as a substitute for professional medical care. Always follow your healthcare professional's instructions.

## 2022-02-28 NOTE — ASSESSMENT & PLAN NOTE
Likely due to heart failure. Respiratory failure resolved   Continue furosemide in oral form.   Consult PT/OT

## 2022-02-28 NOTE — SUBJECTIVE & OBJECTIVE
Interval History: stable at room air.     Review of Systems   Constitutional: Negative.    Respiratory: Negative.     Cardiovascular: Negative.    Gastrointestinal: Negative.    Objective:     Vital Signs (Most Recent):  Temp: 99.4 °F (37.4 °C) (02/28/22 0733)  Pulse: 94 (02/28/22 0733)  Resp: 18 (02/28/22 0733)  BP: 124/79 (02/28/22 0733)  SpO2: 98 % (02/28/22 0733)   Vital Signs (24h Range):  Temp:  [97.9 °F (36.6 °C)-99.4 °F (37.4 °C)] 99.4 °F (37.4 °C)  Pulse:  [70-99] 94  Resp:  [15-20] 18  SpO2:  [93 %-98 %] 98 %  BP: (124-156)/(72-97) 124/79     Weight: 86.9 kg (191 lb 9.3 oz)  Body mass index is 27.49 kg/m².    Intake/Output Summary (Last 24 hours) at 2/28/2022 0813  Last data filed at 2/28/2022 0639  Gross per 24 hour   Intake 1480 ml   Output 3800 ml   Net -2320 ml        Physical Exam  Vitals and nursing note reviewed.   Constitutional:       General: He is not in acute distress.     Appearance: He is not toxic-appearing.   Cardiovascular:      Rate and Rhythm: Normal rate and regular rhythm.   Pulmonary:      Effort: Pulmonary effort is normal.      Breath sounds: No wheezing or rales.   Abdominal:      Palpations: Abdomen is soft.   Musculoskeletal:      Right lower leg: No edema.      Left lower leg: No edema.   Skin:     General: Skin is warm.      Capillary Refill: Capillary refill takes less than 2 seconds.   Neurological:      Mental Status: He is oriented to person, place, and time.   Psychiatric:         Mood and Affect: Mood normal.         Behavior: Behavior normal.       Significant Labs: All pertinent labs within the past 24 hours have been reviewed.    Significant Imaging: I have reviewed all pertinent imaging results/findings within the past 24 hours.  I have reviewed and interpreted all pertinent imaging results/findings within the past 24 hours.

## 2022-02-28 NOTE — PROGRESS NOTES
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 Patient is currently intubated and unable to provide history. His wife, who is at bedside, states he was in his normal state up until today. States he had been eating and drinking just fine but did have to hide his "coke" which he drinks in excess. Apparently had "coke" again this AM as he felt that would help his breathing. He is currently on palliative chemotherapy with Gemcitabine, last dose given 2/15/2022 (about a week ago). Is no longer smoking.     In the ED, patient had progressive respiratory distress. Became diaphoretic and confused. Was also pulling oxygen mask. Patient was therefore intubated. Labwork significant for lactic acidosis of 8.7, hyperglycemia, HAGMA, hypokalemia, mild hyponatremia, hyperphosphatemia, BNP 700s, hyaline casts in UA and troponin 0.9. No ST segment elevation or depression. Cardiology and pulmonology consulted. Given fluids, empiric abx and admitted to ICU.       Overview/Hospital Course:  Mr Iverson presented with acute respiratory failure with hypercapnia/hypoxia. Intubated in the ED. Started on broad spectrum antibiotics. Blood and resp cultures obtained. No pneumothorax, large consolidation or pulmonary embolism but with moderate right sided pleural effusion and known left main bronchial cancerous mass. Given fluids. Lactic acidosis resolved. Questionable diabetic ketoacidosis given hyperglycemia and high anion gap metabolic acidosis. Placed on " insulin infusion. Also started on full dose anticoagulation, double antiplatelet therapy and statin for suspected NSTEMI. Glucose normalized and HAGMA resolved. Insulin discontinued. Fluids stopped. HgbA1c < 6% which is not consistent with diabetes. Cardiology and pulmonology consulted. Echocardiogram showed LVEF 35%-40% with grade 1 diastolic dysfunction. Furosemide IV given. Extubated to NC on 2/23. Overnight patient became hyperactive and encephalopathic. Experienced respiratory failure. Re-intubated. Diuresed. Extubated on 2/25 with precedex in place to reduce anxiety/paranoia post extubation. This worked well and remained stable on low flow NC.       Interval History: stable at room air.     Review of Systems   Constitutional: Negative.    Respiratory: Negative.     Cardiovascular: Negative.    Gastrointestinal: Negative.    Objective:     Vital Signs (Most Recent):  Temp: 97.9 °F (36.6 °C) (02/27/22 1635)  Pulse: 90 (02/27/22 1635)  Resp: 18 (02/27/22 1635)  BP: 132/83 (02/27/22 1635)  SpO2: 95 % (02/27/22 1635)   Vital Signs (24h Range):  Temp:  [97.9 °F (36.6 °C)-98.7 °F (37.1 °C)] 97.9 °F (36.6 °C)  Pulse:  [] 90  Resp:  [15-51] 18  SpO2:  [91 %-100 %] 95 %  BP: (125-192)/(66-98) 132/83     Weight: 99.8 kg (220 lb 0.3 oz)  Body mass index is 31.57 kg/m².    Intake/Output Summary (Last 24 hours) at 2/27/2022 1654  Last data filed at 2/27/2022 1246  Gross per 24 hour   Intake 1820 ml   Output 2800 ml   Net -980 ml        Physical Exam  Vitals and nursing note reviewed.   Constitutional:       General: He is not in acute distress.     Appearance: He is not toxic-appearing.   Cardiovascular:      Rate and Rhythm: Normal rate and regular rhythm.   Pulmonary:      Effort: Pulmonary effort is normal.      Breath sounds: No wheezing or rales.   Abdominal:      Palpations: Abdomen is soft.   Musculoskeletal:      Right lower leg: No edema.      Left lower leg: No edema.   Skin:     General: Skin is warm.       Capillary Refill: Capillary refill takes less than 2 seconds.   Neurological:      Mental Status: He is oriented to person, place, and time.   Psychiatric:         Mood and Affect: Mood normal.         Behavior: Behavior normal.       Significant Labs: All pertinent labs within the past 24 hours have been reviewed.    Significant Imaging: I have reviewed all pertinent imaging results/findings within the past 24 hours.  I have reviewed and interpreted all pertinent imaging results/findings within the past 24 hours.      Assessment/Plan:      * Acute respiratory failure with hypoxia and hypercapnia  Patient with Hypercapnic and Hypoxic Respiratory failure which is Acute.  he is not on home oxygen. Supplemental oxygen was provided and noted- Vent Mode: PS/CPAP  Oxygen Concentration (%):  [40] 40  PEEP/CPAP:  [8 cmH20] 8 cmH20  Pressure Support:  [5 cmH20] 5 cmH20  Mean Airway Pressure:  [9.1 cmH20] 9.1 cmH20.   Signs/symptoms of respiratory failure include- respiratory distress. Contributing diagnoses includes - I suspect cardiogenic pulmonary edema. I also suspect tachypnea to compensate for acidemia Labs and images were reviewed. Patient Has recent ABG, which has been reviewed. Will treat underlying causes and adjust management of respiratory failure as follows   Based on workup thus far, acuity of condition is likely due to cardiogenic edema given new diagnosis of heart failure. Is on furosemide. Cotninue stress ulcer prophylaxis. Extubated yesterday and doing well on low flow NC. Off precedex now and waking up. Will start a diet and hopefully out of bed today. If does well, will consider stepping down to floor.     High anion gap metabolic acidosis  Likely due to lactic acidosis. Resolved         NSTEMI (non-ST elevated myocardial infarction)  Aspirin and clopidogrel given. Statin. Completed 48 hours of full dose anticoagulation. Echocardiogram significant for LVEF 35-40%, which is new. Cardiology planning on  ischemic workup when more stable.   BB and ACE-I when able to tolerate      Hyperglycemia   HgbA1c not consistent with diabetes. Stress induced?        Lactic acidosis  Resolved with fluids      Palliative care encounter  Advance Care Planning     Date: 02/22/2022    [facility]  I engaged the wife in a conversation about advance care planning and we specifically addressed what the goals of care would be moving forward, in light of the patient's change in clinical status.  We did specifically address the patient's likely prognosis, which is fair .  We explored the patient's values and preferences for future care.  The wife endorses that what is most important right now is to focus on curative/life-prolongation (regardless of treatment burdens)    Accordingly, we have decided that the best plan to meet the patient's goals includes continuing with treatment    I did not explain the role for hospice care at this stage of the patient's illness, including its ability to help the patient live with the best quality of life possible.  We will not be making a hospice referral.    I spent a total of > 15 minutes engaging the patient in this advance care planning discussion.    Dr Tran states she had a code status conversation with patient prior to intubation. Patient prefers aggressive care and full code. Wife confirms that these are his wishes.              Hyperphosphatemia  PTH high. Hyperphosphatemia resolved. iCa pending      Hypokalemia  Replaced. Repeat CMP at noon      Primary hypertension  BP not high. Hold off on antihypertensives      Immunodeficiency due to drugs  Noted. Is not leukopenic or neutropenic      Secondary malignant neoplasm of bone  Noted       Lung cancer, main bronchus, left  Noted. Is stage 4. On palliative chemotherapy. Followed by Dr Hathaway as outpatient      History of completed stroke  Noted       Macrocytic anemia  No indication for transfusion at this time    Former smoker  Per patient's wife,  patient has been abstinent for 3 years        VTE Risk Mitigation (From admission, onward)         Ordered     enoxaparin injection 40 mg  Daily         02/24/22 8833                Discharge Planning   AZ:      Code Status: Full Code   Is the patient medically ready for discharge?:     Reason for patient still in hospital (select all that apply): Treatment  Discharge Plan A: Home with family (Patient has OP therapy sessions scheduled)                  Abbey Disla MD  Department of [facility] Medicine   [facility]

## 2022-02-28 NOTE — SUBJECTIVE & OBJECTIVE
Interval History:  Continues to be chest pain free.    Review of Systems   Constitutional: Positive for malaise/fatigue.   HENT: Negative.     Eyes: Negative.    Cardiovascular: Negative.    Respiratory: Negative.     Endocrine: Negative.    Hematologic/Lymphatic: Negative.    Skin: Negative.    Musculoskeletal: Negative.    Gastrointestinal: Negative.    Genitourinary: Negative.    Neurological: Negative.    Psychiatric/Behavioral: Negative.     Allergic/Immunologic: Negative.    Objective:     Vital Signs (Most Recent):  Temp: 99.4 °F (37.4 °C) (02/28/22 1114)  Pulse: 90 (02/28/22 1114)  Resp: 18 (02/28/22 1114)  BP: (!) 141/86 (02/28/22 1114)  SpO2: 97 % (02/28/22 1114)   Vital Signs (24h Range):  Temp:  [97.9 °F (36.6 °C)-99.4 °F (37.4 °C)] 99.4 °F (37.4 °C)  Pulse:  [78-96] 90  Resp:  [16-20] 18  SpO2:  [94 %-98 %] 97 %  BP: (124-156)/(72-97) 141/86     Weight: 86.9 kg (191 lb 9.3 oz)  Body mass index is 27.49 kg/m².     SpO2: 97 %  O2 Device (Oxygen Therapy): room air      Intake/Output Summary (Last 24 hours) at 2/28/2022 1403  Last data filed at 2/28/2022 1200  Gross per 24 hour   Intake 1160 ml   Output 3400 ml   Net -2240 ml       Lines/Drains/Airways       Peripherally Inserted Central Catheter Line  Duration             PICC Triple Lumen 02/22/22 2020 right brachial 5 days              Central Venous Catheter Line  Duration             Port A Cath Single Lumen right subclavian -- days              Drain  Duration                  Urethral Catheter 02/22/22 1530 Straight-tip 16 Fr. 5 days                    Physical Exam  Vitals reviewed.   Constitutional:       Appearance: He is well-developed.   HENT:      Head: Normocephalic.   Eyes:      Conjunctiva/sclera: Conjunctivae normal.      Pupils: Pupils are equal, round, and reactive to light.   Cardiovascular:      Rate and Rhythm: Normal rate and regular rhythm.      Heart sounds: Normal heart sounds.   Pulmonary:      Effort: Pulmonary effort is normal.       Breath sounds: Normal breath sounds.   Abdominal:      General: Bowel sounds are normal.      Palpations: Abdomen is soft.   Musculoskeletal:      Cervical back: Normal range of motion and neck supple.   Skin:     General: Skin is warm.   Neurological:      Mental Status: He is alert and oriented to person, place, and time.       Significant Labs: BMP:   Recent Labs   Lab 02/27/22 0441 02/28/22 0442 02/28/22  1030    98  --     142  --    K 3.2* 2.8* 2.9*   CL 99 97  --    CO2 33* 33*  --    BUN 20 20  --    CREATININE 1.3 1.1  --    CALCIUM 9.4 9.0  --    MG  --   --  1.7   , CMP   Recent Labs   Lab 02/27/22 0441 02/28/22 0442 02/28/22  1030    142  --    K 3.2* 2.8* 2.9*   CL 99 97  --    CO2 33* 33*  --     98  --    BUN 20 20  --    CREATININE 1.3 1.1  --    CALCIUM 9.4 9.0  --    PROT 7.6 7.5  --    ALBUMIN 3.4* 3.4*  --    BILITOT 0.8 0.8  --    ALKPHOS 75 73  --    AST 42* 35  --    ALT 31 32  --    ANIONGAP 12 12  --    ESTGFRAFRICA >60 >60  --    EGFRNONAA 59* >60  --    , CBC   Recent Labs   Lab 02/27/22 0441 02/28/22 0442   WBC 13.83* 12.56   HGB 12.3* 13.0*   HCT 38.4* 40.4    276   , INR No results for input(s): INR, PROTIME in the last 48 hours., Lipid Panel No results for input(s): CHOL, HDL, LDLCALC, TRIG, CHOLHDL in the last 48 hours., Troponin No results for input(s): TROPONINI in the last 48 hours., and All pertinent lab results from the last 24 hours have been reviewed.    Significant Imaging: Echocardiogram: Transthoracic echo (TTE) complete (Cupid Only):   Results for orders placed or performed during the hospital encounter of 02/22/22   Echo   Result Value Ref Range    BSA 2.29 m2    TDI SEPTAL 0.04 m/s    LV LATERAL E/E' RATIO 7.29 m/s    LV SEPTAL E/E' RATIO 12.75 m/s    LA WIDTH 4.99 cm    AORTIC VALVE CUSP SEPERATION 2.31 cm    TDI LATERAL 0.07 m/s    PV PEAK VELOCITY 0.75 cm/s    LVIDd 4.49 3.5 - 6.0 cm    IVS 1.33 (A) 0.6 - 1.1 cm    Posterior  Wall 1.34 (A) 0.6 - 1.1 cm    Ao root annulus 3.92 cm    LVIDs 3.80 2.1 - 4.0 cm    FS 15 28 - 44 %    LA volume 111.72 cm3    Sinus 3.83 cm    STJ 2.92 cm    Ascending aorta 3.08 cm    LV mass 230.68 g    LA size 4.42 cm    RVDD 4.46 cm    TAPSE 1.56 cm    RV S' 9.12 cm/s    Left Ventricle Relative Wall Thickness 0.60 cm    AV mean gradient 3 mmHg    AV valve area 2.95 cm2    AV Velocity Ratio 0.63     AV index (prosthetic) 0.66     MV valve area p 1/2 method 3.41 cm2    E/A ratio 1.21     Mean e' 0.06 m/s    E wave deceleration time 222.66 msec    IVRT 283.74 msec    Pulm vein S/D ratio 1.04     LVOT diameter 2.39 cm    LVOT area 4.5 cm2    LVOT peak luigi 0.70 m/s    LVOT peak VTI 13.85 cm    Ao peak luigi 1.11 m/s    Ao VTI 21.02 cm    LVOT stroke volume 62.10 cm3    AV peak gradient 5 mmHg    E/E' ratio 9.27 m/s    MV Peak E Luigi 0.51 m/s    TR Max Luigi 2.33 m/s    MV stenosis pressure 1/2 time 64.57 ms    MV Peak A Luigi 0.42 m/s    PV Peak S Luigi 0.29 m/s    PV Peak D Luigi 0.28 m/s    LV Systolic Volume 61.96 mL    LV Systolic Volume Index 28.6 mL/m2    LV Diastolic Volume 92.20 mL    LV Diastolic Volume Index 42.49 mL/m2    LA Volume Index 51.5 mL/m2    LV Mass Index 106 g/m2    RA Major Axis 5.73 cm    Left Atrium Minor Axis 6.03 cm    Left Atrium Major Axis 5.89 cm    Triscuspid Valve Regurgitation Peak Gradient 22 mmHg    RA Width 4.32 cm    EF 35 %    Narrative    · The left ventricle is normal in size with concentric hypertrophy and   moderately decreased systolic function.  · The estimated ejection fraction is 35-40%.  · Grade I left ventricular diastolic dysfunction.  · Mild right ventricular enlargement with normal right ventricular   systolic function.  · Mild left atrial enlargement.  · Mild right atrial enlargement.  · Mild tricuspid regurgitation.

## 2022-02-28 NOTE — NURSING
Indwelling griffin catheter removed as ordered. No bleeding, trauma noted.10 cc clear, colorless fluid removed from balloon. Catheter tip intact as evidence by blue tip. Patient tolerated procedure well. No apparent distress noted.

## 2022-02-28 NOTE — ASSESSMENT & PLAN NOTE
Aspirin and clopidogrel given. Statin. Completed 48 hours of full dose anticoagulation. Echocardiogram significant for LVEF 35-40%, which is new. Plan for LHC. Patient and wife would like to discuss this with his oncologist before any agreement.   BB and ACE-I when able to tolerate

## 2022-02-28 NOTE — PROGRESS NOTES
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 Patient is currently intubated and unable to provide history. His wife, who is at bedside, states he was in his normal state up until today. States he had been eating and drinking just fine but did have to hide his "coke" which he drinks in excess. Apparently had "coke" again this AM as he felt that would help his breathing. He is currently on palliative chemotherapy with Gemcitabine, last dose given 2/15/2022 (about a week ago). Is no longer smoking.     In the ED, patient had progressive respiratory distress. Became diaphoretic and confused. Was also pulling oxygen mask. Patient was therefore intubated. Labwork significant for lactic acidosis of 8.7, hyperglycemia, HAGMA, hypokalemia, mild hyponatremia, hyperphosphatemia, BNP 700s, hyaline casts in UA and troponin 0.9. No ST segment elevation or depression. Cardiology and pulmonology consulted. Given fluids, empiric abx and admitted to ICU.       Overview/Hospital Course:  Mr Iverson presented with acute respiratory failure with hypercapnia/hypoxia. Intubated in the ED. Started on broad spectrum antibiotics. Blood and resp cultures obtained. No pneumothorax, large consolidation or pulmonary embolism but with moderate right sided pleural effusion and known left main bronchial cancerous mass. Given fluids. Lactic acidosis resolved. Questionable diabetic ketoacidosis given hyperglycemia and high anion gap metabolic acidosis. Placed on " insulin infusion. Also started on full dose anticoagulation, double antiplatelet therapy and statin for suspected NSTEMI. Glucose normalized and HAGMA resolved. Insulin discontinued. Fluids stopped. HgbA1c < 6% which is not consistent with diabetes. Cardiology and pulmonology consulted. Echocardiogram showed LVEF 35%-40% with grade 1 diastolic dysfunction. Furosemide IV given. Extubated to NC on 2/23. Overnight patient became hyperactive and encephalopathic. Experienced respiratory failure. Re-intubated. Diuresed. Extubated on 2/25 with precedex in place to reduce anxiety/paranoia post extubation. This worked well and remained stable on low flow NC. Plan for [facility]. PT/OT consulted for dispo.       Interval History: stable at room air.     Review of Systems   Constitutional: Negative.    Respiratory: Negative.     Cardiovascular: Negative.    Gastrointestinal: Negative.    Objective:     Vital Signs (Most Recent):  Temp: 99.4 °F (37.4 °C) (02/28/22 0733)  Pulse: 94 (02/28/22 0733)  Resp: 18 (02/28/22 0733)  BP: 124/79 (02/28/22 0733)  SpO2: 98 % (02/28/22 0733)   Vital Signs (24h Range):  Temp:  [97.9 °F (36.6 °C)-99.4 °F (37.4 °C)] 99.4 °F (37.4 °C)  Pulse:  [70-99] 94  Resp:  [15-20] 18  SpO2:  [93 %-98 %] 98 %  BP: (124-156)/(72-97) 124/79     Weight: 86.9 kg (191 lb 9.3 oz)  Body mass index is 27.49 kg/m².    Intake/Output Summary (Last 24 hours) at 2/28/2022 0813  Last data filed at 2/28/2022 0639  Gross per 24 hour   Intake 1480 ml   Output 3800 ml   Net -2320 ml        Physical Exam  Vitals and nursing note reviewed.   Constitutional:       General: He is not in acute distress.     Appearance: He is not toxic-appearing.   Cardiovascular:      Rate and Rhythm: Normal rate and regular rhythm.   Pulmonary:      Effort: Pulmonary effort is normal.      Breath sounds: No wheezing or rales.   Abdominal:      Palpations: Abdomen is soft.   Musculoskeletal:      Right lower leg: No edema.      Left lower leg: No edema.    Skin:     General: Skin is warm.      Capillary Refill: Capillary refill takes less than 2 seconds.   Neurological:      Mental Status: He is oriented to person, place, and time.   Psychiatric:         Mood and Affect: Mood normal.         Behavior: Behavior normal.       Significant Labs: All pertinent labs within the past 24 hours have been reviewed.    Significant Imaging: I have reviewed all pertinent imaging results/findings within the past 24 hours.  I have reviewed and interpreted all pertinent imaging results/findings within the past 24 hours.      Assessment/Plan:      * Acute respiratory failure with hypoxia and hypercapnia  Likely due to heart failure. Respiratory failure resolved   Continue furosemide in oral form.   Consult PT/OT    High anion gap metabolic acidosis  Likely due to lactic acidosis. Resolved         NSTEMI (non-ST elevated myocardial infarction)  Aspirin and clopidogrel given. Statin. Completed 48 hours of full dose anticoagulation. Echocardiogram significant for LVEF 35-40%, which is new. Plan for [facility]. Patient and wife would like to discuss this with his oncologist before any agreement.   BB and ACE-I when able to tolerate      Hyperglycemia   HgbA1c not consistent with diabetes. Stress induced?        Lactic acidosis  Resolved with fluids      Palliative care encounter  Advance Care Planning     Date: 02/22/2022    [facility]  I engaged the wife in a conversation about advance care planning and we specifically addressed what the goals of care would be moving forward, in light of the patient's change in clinical status.  We did specifically address the patient's likely prognosis, which is fair .  We explored the patient's values and preferences for future care.  The wife endorses that what is most important right now is to focus on curative/life-prolongation (regardless of treatment burdens)    Accordingly, we have decided that the best plan to meet the patient's goals includes continuing  with treatment    I did not explain the role for hospice care at this stage of the patient's illness, including its ability to help the patient live with the best quality of life possible.  We will not be making a hospice referral.    I spent a total of > 15 minutes engaging the patient in this advance care planning discussion.    Dr Tran states she had a code status conversation with patient prior to intubation. Patient prefers aggressive care and full code. Wife confirms that these are his wishes.              Hyperphosphatemia  PTH high. Hyperphosphatemia resolved.       Hypokalemia  Replaced. Repeat CMP at noon      Primary hypertension  BP not high. Hold off on antihypertensives      Immunodeficiency due to drugs  Noted. Is not leukopenic or neutropenic      Secondary malignant neoplasm of bone  Noted       Lung cancer, main bronchus, left  Noted. Is stage 4. On palliative chemotherapy. Followed by Dr Hathaway as outpatient      History of completed stroke  Noted       Macrocytic anemia  No indication for transfusion at this time    Former smoker  Per patient's wife, patient has been abstinent for 3 years      VTE Risk Mitigation (From admission, onward)         Ordered     enoxaparin injection 40 mg  Daily         02/24/22 1446                Discharge Planning   AZ:      Code Status: Full Code   Is the patient medically ready for discharge?:     Reason for patient still in hospital (select all that apply): Treatment  Discharge Plan A: Home with family (Patient has OP therapy sessions scheduled)          Discussed with patient at bedside. PT/OT consulted for alpa Disla MD  Department of Hospital Medicine   [facility]

## 2022-03-01 LAB
ALBUMIN SERPL BCP-MCNC: 3.4 G/DL (ref 3.5–5.2)
ALP SERPL-CCNC: 79 U/L (ref 55–135)
ALT SERPL W/O P-5'-P-CCNC: 30 U/L (ref 10–44)
ANION GAP SERPL CALC-SCNC: 10 MMOL/L (ref 8–16)
AST SERPL-CCNC: 32 U/L (ref 10–40)
BILIRUB SERPL-MCNC: 0.7 MG/DL (ref 0.1–1)
BUN SERPL-MCNC: 19 MG/DL (ref 8–23)
CALCIUM SERPL-MCNC: 9.1 MG/DL (ref 8.7–10.5)
CHLORIDE SERPL-SCNC: 98 MMOL/L (ref 95–110)
CO2 SERPL-SCNC: 32 MMOL/L (ref 23–29)
CREAT SERPL-MCNC: 1 MG/DL (ref 0.5–1.4)
EST. GFR  (AFRICAN AMERICAN): >60 ML/MIN/1.73 M^2
EST. GFR  (NON AFRICAN AMERICAN): >60 ML/MIN/1.73 M^2
GLUCOSE SERPL-MCNC: 102 MG/DL (ref 70–110)
PHOSPHATE SERPL-MCNC: 3.2 MG/DL (ref 2.7–4.5)
POTASSIUM SERPL-SCNC: 3.3 MMOL/L (ref 3.5–5.1)
PROT SERPL-MCNC: 7.3 G/DL (ref 6–8.4)
SODIUM SERPL-SCNC: 140 MMOL/L (ref 136–145)

## 2022-03-01 PROCEDURE — A4216 STERILE WATER/SALINE, 10 ML: HCPCS | Performed by: INTERNAL MEDICINE

## 2022-03-01 PROCEDURE — 36415 COLL VENOUS BLD VENIPUNCTURE: CPT | Performed by: INTERNAL MEDICINE

## 2022-03-01 PROCEDURE — 99233 SBSQ HOSP IP/OBS HIGH 50: CPT | Mod: ,,, | Performed by: INTERNAL MEDICINE

## 2022-03-01 PROCEDURE — 25000003 PHARM REV CODE 250: Performed by: STUDENT IN AN ORGANIZED HEALTH CARE EDUCATION/TRAINING PROGRAM

## 2022-03-01 PROCEDURE — 63600175 PHARM REV CODE 636 W HCPCS: Mod: JG | Performed by: STUDENT IN AN ORGANIZED HEALTH CARE EDUCATION/TRAINING PROGRAM

## 2022-03-01 PROCEDURE — 80053 COMPREHEN METABOLIC PANEL: CPT | Performed by: INTERNAL MEDICINE

## 2022-03-01 PROCEDURE — 97165 OT EVAL LOW COMPLEX 30 MIN: CPT

## 2022-03-01 PROCEDURE — 99233 PR SUBSEQUENT HOSPITAL CARE,LEVL III: ICD-10-PCS | Mod: ,,, | Performed by: INTERNAL MEDICINE

## 2022-03-01 PROCEDURE — 25000003 PHARM REV CODE 250: Performed by: INTERNAL MEDICINE

## 2022-03-01 PROCEDURE — 97535 SELF CARE MNGMENT TRAINING: CPT

## 2022-03-01 PROCEDURE — 63600175 PHARM REV CODE 636 W HCPCS: Performed by: INTERNAL MEDICINE

## 2022-03-01 PROCEDURE — 84100 ASSAY OF PHOSPHORUS: CPT | Performed by: INTERNAL MEDICINE

## 2022-03-01 PROCEDURE — 21400001 HC TELEMETRY ROOM

## 2022-03-01 RX ORDER — NAPROXEN SODIUM 220 MG/1
81 TABLET, FILM COATED ORAL DAILY
Status: DISCONTINUED | OUTPATIENT
Start: 2022-03-01 | End: 2022-03-05 | Stop reason: HOSPADM

## 2022-03-01 RX ORDER — ATORVASTATIN CALCIUM 40 MG/1
80 TABLET, FILM COATED ORAL DAILY
Status: DISCONTINUED | OUTPATIENT
Start: 2022-03-01 | End: 2022-03-05 | Stop reason: HOSPADM

## 2022-03-01 RX ORDER — METOPROLOL TARTRATE 25 MG/1
25 TABLET, FILM COATED ORAL 2 TIMES DAILY
Status: DISCONTINUED | OUTPATIENT
Start: 2022-03-01 | End: 2022-03-05 | Stop reason: HOSPADM

## 2022-03-01 RX ORDER — CLOPIDOGREL BISULFATE 75 MG/1
75 TABLET ORAL DAILY
Status: DISCONTINUED | OUTPATIENT
Start: 2022-03-01 | End: 2022-03-05 | Stop reason: HOSPADM

## 2022-03-01 RX ADMIN — ACETAMINOPHEN 650 MG: 325 TABLET ORAL at 06:03

## 2022-03-01 RX ADMIN — POTASSIUM PHOSPHATE, MONOBASIC 500 MG: 500 TABLET, SOLUBLE ORAL at 10:03

## 2022-03-01 RX ADMIN — METOPROLOL TARTRATE 25 MG: 25 TABLET, FILM COATED ORAL at 09:03

## 2022-03-01 RX ADMIN — OLANZAPINE 5 MG: 5 TABLET, ORALLY DISINTEGRATING ORAL at 09:03

## 2022-03-01 RX ADMIN — Medication 10 ML: at 12:03

## 2022-03-01 RX ADMIN — METOPROLOL TARTRATE 25 MG: 25 TABLET, FILM COATED ORAL at 12:03

## 2022-03-01 RX ADMIN — ALTEPLASE 2 MG: 2.2 INJECTION, POWDER, LYOPHILIZED, FOR SOLUTION INTRAVENOUS at 01:03

## 2022-03-01 RX ADMIN — Medication 10 ML: at 06:03

## 2022-03-01 RX ADMIN — CLOPIDOGREL 75 MG: 75 TABLET, FILM COATED ORAL at 10:03

## 2022-03-01 RX ADMIN — ATORVASTATIN CALCIUM 80 MG: 40 TABLET, FILM COATED ORAL at 10:03

## 2022-03-01 RX ADMIN — ENOXAPARIN SODIUM 40 MG: 40 INJECTION SUBCUTANEOUS at 06:03

## 2022-03-01 RX ADMIN — Medication 400 MG: at 10:03

## 2022-03-01 RX ADMIN — ASPIRIN 81 MG CHEWABLE TABLET 81 MG: 81 TABLET CHEWABLE at 10:03

## 2022-03-01 RX ADMIN — FUROSEMIDE 40 MG: 40 TABLET ORAL at 10:03

## 2022-03-01 NOTE — PLAN OF CARE
Problem: Occupational Therapy Goal  Goal: Occupational Therapy Goal  Description: Goals to be met by: 3/15/22     Patient will increase functional independence with ADLs by performing:    UE Dressing with Modified [place].  LE Dressing with Modified [place].  Grooming while standing at sink with Modified [place] and Assistive Devices as needed.  Supine to sit with Modified [place].  Step transfer with Modified [place]  Toilet transfer to toilet with Modified [place].  Upper extremity exercise program x15 reps per handout, with independence.  Educate the patient in Home Safety/Fall Prevention  Outcome: Ongoing, Progressing     The patient will benefit from HH OT.

## 2022-03-01 NOTE — PROGRESS NOTES
[facility] - Telemetry  Adult Nutrition  Progress Note    SUMMARY       Recommendations    1) Continue Cardiac Diet, encourage PO   2) Add Boost (+) BID to assist in meeting increased needs per CA dx.   3) Hypokalemia - replete PRN    Goals:   1) Patient to consistently meet > 75% EEN/EPN via PO/ONS intake  2) Monitored labs to trend toward target ranges    Nutrition Goal Status: progressing towards goal  Communication of RD Recs:  (POC)    Assessment and Plan  Nutrition Problem  Increased Protein Energy Needs    Related to (etiology):   Hypermetabolic State    Signs and Symptoms (as evidenced by):   Stage 4 Squamous Cell Cancer of Lung    Interventions/Recommendations (treatment strategy):  Mineral Modified Diet  Commercial beverage  Collaboration of care with other providers    Nutrition Diagnosis Status:   New      Reason for Assessment    Reason For Assessment: consult, new tube feeding  Diagnosis: other (see comments) (acute respiratory failure with hypoxia and hypercapnia\)  Relevant Medical History: 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 Fully extubated 2/25 - pt now on cardiac diet - no cc - PO intake ~ 75% and tolerating - will continue to follow.      Per weight hx - new weight 2/28 191# - January 7 pt weight 225#. 34#, 15% loss in < 2 months, Significant. Will follow up with NFPE.    2/23 - Remote assessment for coverage, consult for education and tube feed recs. Pt intubated/sedated, concern for DKA at admit, with hypokalemia, hyponatremia and hyperglycemia. BG improving. Nutrition education attached to discharge documents. Per chart, UBW ~230-240 lbs x 1 year showing about 4% wt loss x 3 months, not significant. Unable to complete NFPE due to remote assessment.  Nutrition Discharge Planning:  "Cardiac Diet, No undercooked meats, unpasturized milks, juices, cheeses, heat lunch meats    Nutrition Risk Screen    Nutrition Risk Screen: no indicators present    Nutrition/Diet History    Patient Reported Diet/Restrictions/Preferences: general  Typical Food/Fluid Intake: Per MD note, pt drinks a lot of "coke" at home  Food Allergies: NKFA  Factors Affecting Nutritional Intake: None identified at this time    Anthropometrics    Temp: 97.8 °F (36.6 °C)  Height Method: Measured  Height: 5' 10" (177.8 cm)  Height (inches): 70 in  Weight Method: Bed Scale  Weight: 86.9 kg (191 lb 9.3 oz)  Weight (lb): 191.58 lb  Ideal Body Weight (IBW), Male: 166 lb  % Ideal Body Weight, Male (lb): 140.96 %  BMI (Calculated): 27.5  BMI Grade: 25 - 29.9 - overweight       Lab/Procedures/Meds    Pertinent Labs Reviewed: reviewed  CBC:  Recent Labs   Lab 02/28/22 0442   WBC 12.56   HGB 13.0*   HCT 40.4        CMP:  Recent Labs   Lab 02/28/22  0442 02/28/22  1030   CALCIUM 9.0  --    ALBUMIN 3.4*  --    PROT 7.5  --      --    K 2.8* 2.9*   CO2 33*  --    CL 97  --    BUN 20  --    CREATININE 1.1  --    ALKPHOS 73  --    ALT 32  --    AST 35  --    BILITOT 0.8  --      Glucose   Date Value Ref Range Status   02/28/2022 98 70 - 110 mg/dL Final     Pertinent Medications Reviewed: reviewed  Scheduled Meds:   aspirin  81 mg Per OG tube Daily    atorvastatin  80 mg Per OG tube Daily    clopidogreL  75 mg Per OG tube Daily    enoxaparin  40 mg Subcutaneous Daily    [START ON 3/1/2022] furosemide  40 mg Oral Daily    hydrALAZINE  10 mg Intravenous Once    magnesium oxide  400 mg Oral Daily    sodium chloride 0.9%  10 mL Intravenous Q6H     Continuous Infusions:  PRN Meds:.acetaminophen, olanzapine zydis, Flushing PICC Protocol **AND** sodium chloride 0.9% **AND** sodium chloride 0.9%    Estimated/Assessed Needs    Weight Used For Calorie Calculations: 86.9 kg (191 lb 9.3 oz)  Energy Calorie Requirements (kcal): " 2173  Energy Need Method: Kcal/kg (25 per CA dx BMI > 24.9)  Protein Requirements: 87 - 105g (1.0 - 1.2g/kg per increased PRO needs CA dx)  Weight Used For Protein Calculations: 86.9 kg (191 lb 9.3 oz)  Fluid Requirements (mL): 1 mL/kcal or per MD  Estimated Fluid Requirement Method: RDA Method  RDA Method (mL): 2173  CHO Requirement: 272g      Nutrition Prescription Ordered    Current Diet Order: Cardiac Diet    Evaluation of Received Nutrient/Fluid Intake    Energy Calories Required: not meeting needs  Protein Required: not meeting needs  Fluid Required: not meeting needs  Comments: LBM 2/27  Tolerance: tolerating  % Intake of Estimated Energy Needs: 75 - 100 %  % Meal Intake: 75 - 100 %    Intake/Output - Last 3 Shifts       02/27 0700  02/28 0659 02/28 0700  03/01 0659    P.O. 1720 480    I.V. (mL/kg)      Total Intake(mL/kg) 1720 (19.8) 480 (5.5)    Urine (mL/kg/hr) 3800 (1.8)     Emesis/NG output      Other 0     Stool 0     Total Output 3800     Net -2080 +480          Urine Occurrence 1 x 1 x    Stool Occurrence 2 x 0 x          Nutrition Risk    Level of Risk/Frequency of Follow-up:  (1x/week)     Monitor and Evaluation    Food and Nutrient Intake: energy intake, food and beverage intake  Food and Nutrient Adminstration: diet order  Knowledge/Beliefs/Attitudes: beliefs and attitudes  Physical Activity and Function: nutrition-related ADLs and IADLs  Anthropometric Measurements: weight, weight change, body mass index  Biochemical Data, Medical Tests and Procedures: electrolyte and renal panel, gastrointestinal profile, glucose/endocrine profile, inflammatory profile, lipid profile  Nutrition-Focused Physical Findings: overall appearance, extremities, muscles and bones, head and eyes, skin     Nutrition Follow-Up    RD Follow-up?: Yes

## 2022-03-01 NOTE — PROGRESS NOTES
[facility] Telemetry  Cardiology  Progress Note    Patient Name: Kashif Iverson  MRN: [id number]  Admission Date: 2/22/2022  Hospital Length of Stay: 7 days  Code Status: Full Code   Attending Physician: Rolando Knott MD   Primary Care Physician: Eduardo Ruiz MD  Expected Discharge Date:   Principal Problem:Acute respiratory failure with hypoxia and hypercapnia    Subjective:           Interval History:  Continues to be chest pain free.    Review of Systems   Constitutional: Positive for malaise/fatigue.   HENT: Negative.     Eyes: Negative.    Cardiovascular: Negative.    Respiratory: Negative.     Endocrine: Negative.    Hematologic/Lymphatic: Negative.    Skin: Negative.    Musculoskeletal: Negative.    Gastrointestinal: Negative.    Genitourinary: Negative.    Neurological: Negative.    Psychiatric/Behavioral: Negative.     Allergic/Immunologic: Negative.    Objective:     Vital Signs (Most Recent):  Temp: 97.6 °F (36.4 °C) (03/01/22 0849)  Pulse: (!) 114 (03/01/22 0849)  Resp: 18 (03/01/22 0849)  BP: (!) 164/93 (03/01/22 0849)  SpO2: 95 % (03/01/22 0849)   Vital Signs (24h Range):  Temp:  [97.6 °F (36.4 °C)-99.4 °F (37.4 °C)] 97.6 °F (36.4 °C)  Pulse:  [] 114  Resp:  [18] 18  SpO2:  [94 %-97 %] 95 %  BP: (136-172)/(71-98) 164/93     Weight: 86.9 kg (191 lb 9.3 oz)  Body mass index is 27.49 kg/m².     SpO2: 95 %  O2 Device (Oxygen Therapy): room air      Intake/Output Summary (Last 24 hours) at 3/1/2022 1045  Last data filed at 3/1/2022 0948  Gross per 24 hour   Intake 240 ml   Output 400 ml   Net -160 ml         Lines/Drains/Airways       Peripherally Inserted Central Catheter Line  Duration             PICC Triple Lumen 02/22/22 2020 right brachial 6 days              Central Venous Catheter Line  Duration             Port A Cath Single Lumen right subclavian -- days                    Physical Exam  Vitals reviewed.   Constitutional:       Appearance: He is well-developed.   HENT:      Head:  Normocephalic.   Eyes:      Conjunctiva/sclera: Conjunctivae normal.      Pupils: Pupils are equal, round, and reactive to light.   Cardiovascular:      Rate and Rhythm: Normal rate and regular rhythm.      Heart sounds: Normal heart sounds.   Pulmonary:      Effort: Pulmonary effort is normal.      Breath sounds: Normal breath sounds.   Abdominal:      General: Bowel sounds are normal.      Palpations: Abdomen is soft.   Musculoskeletal:      Cervical back: Normal range of motion and neck supple.   Skin:     General: Skin is warm.   Neurological:      Mental Status: He is alert and oriented to person, place, and time.       Significant Labs: BMP:   Recent Labs   Lab 02/28/22 0442 02/28/22  1030 03/01/22  0400   GLU 98  --  102     --  140   K 2.8* 2.9* 3.3*   CL 97  --  98   CO2 33*  --  32*   BUN 20  --  19   CREATININE 1.1  --  1.0   CALCIUM 9.0  --  9.1   MG  --  1.7  --      , CMP   Recent Labs   Lab 02/28/22 0442 02/28/22  1030 03/01/22  0400     --  140   K 2.8* 2.9* 3.3*   CL 97  --  98   CO2 33*  --  32*   GLU 98  --  102   BUN 20  --  19   CREATININE 1.1  --  1.0   CALCIUM 9.0  --  9.1   PROT 7.5  --  7.3   ALBUMIN 3.4*  --  3.4*   BILITOT 0.8  --  0.7   ALKPHOS 73  --  79   AST 35  --  32   ALT 32  --  30   ANIONGAP 12  --  10   ESTGFRAFRICA >60  --  >60   EGFRNONAA >60  --  >60     , CBC   Recent Labs   Lab 02/28/22 0442   WBC 12.56   HGB 13.0*   HCT 40.4        , INR No results for input(s): INR, PROTIME in the last 48 hours., Lipid Panel No results for input(s): CHOL, HDL, LDLCALC, TRIG, CHOLHDL in the last 48 hours., Troponin No results for input(s): TROPONINI in the last 48 hours., and All pertinent lab results from the last 24 hours have been reviewed.    Significant Imaging: Echocardiogram: Transthoracic echo (TTE) complete (Cupid Only):   Results for orders placed or performed during the hospital encounter of 02/22/22   Echo   Result Value Ref Range    BSA 2.29 m2    TDI  SEPTAL 0.04 m/s    LV LATERAL E/E' RATIO 7.29 m/s    LV SEPTAL E/E' RATIO 12.75 m/s    LA WIDTH 4.99 cm    AORTIC VALVE CUSP SEPERATION 2.31 cm    TDI LATERAL 0.07 m/s    PV PEAK VELOCITY 0.75 cm/s    LVIDd 4.49 3.5 - 6.0 cm    IVS 1.33 (A) 0.6 - 1.1 cm    Posterior Wall 1.34 (A) 0.6 - 1.1 cm    Ao root annulus 3.92 cm    LVIDs 3.80 2.1 - 4.0 cm    FS 15 28 - 44 %    LA volume 111.72 cm3    Sinus 3.83 cm    STJ 2.92 cm    Ascending aorta 3.08 cm    LV mass 230.68 g    LA size 4.42 cm    RVDD 4.46 cm    TAPSE 1.56 cm    RV S' 9.12 cm/s    Left Ventricle Relative Wall Thickness 0.60 cm    AV mean gradient 3 mmHg    AV valve area 2.95 cm2    AV Velocity Ratio 0.63     AV index (prosthetic) 0.66     MV valve area p 1/2 method 3.41 cm2    E/A ratio 1.21     Mean e' 0.06 m/s    E wave deceleration time 222.66 msec    IVRT 283.74 msec    Pulm vein S/D ratio 1.04     LVOT diameter 2.39 cm    LVOT area 4.5 cm2    LVOT peak luigi 0.70 m/s    LVOT peak VTI 13.85 cm    Ao peak luigi 1.11 m/s    Ao VTI 21.02 cm    LVOT stroke volume 62.10 cm3    AV peak gradient 5 mmHg    E/E' ratio 9.27 m/s    MV Peak E Luigi 0.51 m/s    TR Max Luigi 2.33 m/s    MV stenosis pressure 1/2 time 64.57 ms    MV Peak A Luigi 0.42 m/s    PV Peak S Luigi 0.29 m/s    PV Peak D Luigi 0.28 m/s    LV Systolic Volume 61.96 mL    LV Systolic Volume Index 28.6 mL/m2    LV Diastolic Volume 92.20 mL    LV Diastolic Volume Index 42.49 mL/m2    LA Volume Index 51.5 mL/m2    LV Mass Index 106 g/m2    RA Major Axis 5.73 cm    Left Atrium Minor Axis 6.03 cm    Left Atrium Major Axis 5.89 cm    Triscuspid Valve Regurgitation Peak Gradient 22 mmHg    RA Width 4.32 cm    EF 35 %    Narrative    · The left ventricle is normal in size with concentric hypertrophy and   moderately decreased systolic function.  · The estimated ejection fraction is 35-40%.  · Grade I left ventricular diastolic dysfunction.  · Mild right ventricular enlargement with normal right ventricular   systolic  function.  · Mild left atrial enlargement.  · Mild right atrial enlargement.  · Mild tricuspid regurgitation.        Assessment and Plan:     Brief HPI:     * Acute respiratory failure with hypoxia and hypercapnia  Extubated today.    Multifocal atrial tachycardia  New Dx. Related to underlying pulmonary issues. Now resolved    High anion gap metabolic acidosis  Being treated for possible DKA    NSTEMI (non-ST elevated myocardial infarction)  Peak troponin 1.7. Suspect demand ischemia from tachycardia, respiratory distress and acidosis. Echo with EF 35-40% ? Tachymyopathy. Will discuss ischemic evaluation when more stable.    Discussed with patient and wife.  They would like to discuss with the oncologist before making a decision about coronary angiogram.        Lung cancer, main bronchus, left  Per primary    Former smoker            VTE Risk Mitigation (From admission, onward)         Ordered     enoxaparin injection 40 mg  Daily         02/24/22 6475                Catia Roman MD  Cardiology  [facility] - Telemetry

## 2022-03-01 NOTE — PT/OT/SLP EVAL
"Occupational Therapy   Evaluation    Name: Kashif Iverson  MRN: [id number]  Admitting Diagnosis:  Acute respiratory failure with hypoxia and hypercapnia  Recent Surgery: * No surgery found *      Recommendations:     Discharge Recommendations: home health OT  Discharge Equipment Recommendations:  bath bench  Barriers to discharge:  None    Assessment:     Kashif Iverson is a 61 y.o. male with a medical diagnosis of Acute respiratory failure with hypoxia and hypercapnia.  Performance deficits affecting function: impaired endurance, impaired self care skills, impaired functional mobilty, gait instability, decreased safety awareness, impaired balance, decreased upper extremity function.    The patient participated in OT eval and grooming tasks at the sink. The patient was able to amb with CGA/SBA to/from the sink with no LOB but used the walls and bed for stability. The patient will benefit from  OT eval to assess home safety.    Rehab Prognosis: Good and Fair; patient would benefit from acute skilled OT services to address these deficits and reach maximum level of function.       Plan:     Patient to be seen 3 x/week to address the above listed problems via self-care/home management, therapeutic activities, therapeutic exercises  · Plan of Care Expires: 03/15/22  · Plan of Care Reviewed with: patient    Subjective     Chief Complaint: "I didn't like those wires hanging out of the pocket"  Patient/Family Comments/goals: agreeable to OT eval    Occupational Profile:  Living Environment: lives with his spouse in a SS house with no CHENG, tub/shower combo  Previous level of function: Patient reports (I) with self care and amb, has a cane but does not use. Per chart, the patient received assist for srlf care from his spouse?  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  Patient found HOB elevated with PICC line (The patient had removed telemetry) upon OT entry to room.    General Precautions: Standard, fall   Orthopedic Precautions:N/A   Braces: N/A  Respiratory Status: Room air    Occupational Performance:    Bed Mobility:    · Patient completed Scooting/Bridging with stand by assistance  · Patient completed Supine to Sit with stand by assistance and HOB elevated    Functional Mobility/Transfers:  · Patient completed Sit <> Stand Transfer with stand by assistance  with  no assistive device   · Functional Mobility: The patient amb to the sink and chair with SBA/CGA with no AD but reached for the walls or bed for stability, no LOB.    Activities of Daily Living:  · Grooming: stand by assistance to stand at the sink to wash his hands and face, rinse mouth with mothwash  · Upper Body Dressing: contact guard assistance to don back gown  · Lower Body Dressing: stand by assistance to don socks while seated on the EOB    Cognitive/Visual Perceptual:  Cognitive/Psychosocial Skills:     -       Oriented to: Person, Place and stated date as April, 2021   -       Follows Commands/attention:Follows one-step commands and Follows two-step commands  -       Communication: clear/fluent  -       Memory: No Deficits noted  -       Safety awareness/insight to disability: impaired   -       Mood/Affect/Coping skills/emotional control: Appropriate to situation  Visual/Perceptual:      -Intact      Physical Exam:  Balance: -       fair+  Postural examination/scapula alignment:    -       No postural abnormalities identified  Skin integrity: Visible skin intact  Edema:  None noted  Sensation:    -       Intact  Dominant hand: -       right  Upper Extremity Range of Motion:     -       Right Upper Extremity: WNL  -       Left Upper Extremity: WNL  Upper Extremity Strength:   "  -       Right Upper Extremity: WFL  -       Left Upper Extremity: WFL   Strength:    -       Right Upper Extremity: WFL  -       Left Upper Extremity: WFL  Fine Motor Coordination:    -       Intact  Left hand thumb/finger opposition skills and Right hand thumb/finger opposition skills but slow    AMPAC 6 Click ADL:  AMPAC Total Score: 21    Treatment & Education:  · Participated in OT eval  · HR was monitored. Per telemetry monitor tech, the patient's HR was in the 100's earlier but was "off line" prior to OT. EOB HR: 102, SpO2 99%, after amb: HR 80's-90's . Rosemary Darby notified.  · Participated in self care and functional mobility as noted above  · Discussed DME needs and assistance available at home  · Educated the patient re: need to call nurse to return to bed or amb to the bathroom. The patient was educated re: call bell use, chair alarm was placed in the chair.  · Home Safety/Fall Prevention handout attached to chart  Education:    Patient left up in chair, reclined with all lines intact, call button in reach, chair alarm on and Rosemary darby notified    GOALS:   Multidisciplinary Problems     Occupational Therapy Goals        Problem: Occupational Therapy Goal    Goal Priority Disciplines Outcome Interventions   Occupational Therapy Goal     OT, PT/OT Ongoing, Progressing    Description: Goals to be met by: 3/15/22     Patient will increase functional independence with ADLs by performing:    UE Dressing with Modified [place].  LE Dressing with Modified [place].  Grooming while standing at sink with Modified [place] and Assistive Devices as needed.  Supine to sit with Modified [place].  Step transfer with Modified [place]  Toilet transfer to toilet with Modified [place].  Upper extremity exercise program x15 reps per handout, with independence.  Educate the patient in Home Safety/Fall Prevention                   History:     Past Medical History:   Diagnosis Date    " Hypertension     Lung cancer     NSCLC    Lung mass     left lung       Past Surgical History:   Procedure Laterality Date    BRONCHOSCOPY N/A 8/30/2019    Procedure: Bronchoscopy;  Surgeon: Miguel Diagnostic Provider;  Location: [facility] OR [address] [place] [address];  Service: Anesthesiology;  Laterality: N/A;       Time Tracking:     OT Date of Treatment: 03/01/22  OT Start Time: 1004  OT Stop Time: 1028  OT Total Time (min): 24 min    Billable Minutes:Evaluation 15  Self Care/Home Management 9  Total Time 24    3/1/2022

## 2022-03-01 NOTE — ASSESSMENT & PLAN NOTE
Aspirin and clopidogrel given. Statin. Completed 48 hours of full dose anticoagulation. Echocardiogram significant for LVEF 35-40%, which is new. Plan for C. Patient and wife would like to discuss this with his oncologist before any agreement.   BB and ACE-I when able to tolerate - start on metoprolol  - discussed at length with wife, suspect they will do as outpatient but wants to discuss with Dr. Hathaway

## 2022-03-01 NOTE — PROGRESS NOTES
Right pleural effusion has resolved with diuresis. No further pulmonary workup is needed at this time. Continue routine follow up with oncology for squamous cell cancer of the left lung.    Mir Fitzgerald MD  [facility]

## 2022-03-01 NOTE — PROGRESS NOTES
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 Patient is currently intubated and unable to provide history. His wife, who is at bedside, states he was in his normal state up until today. States he had been eating and drinking just fine but did have to hide his "coke" which he drinks in excess. Apparently had "coke" again this AM as he felt that would help his breathing. He is currently on palliative chemotherapy with Gemcitabine, last dose given 2/15/2022 (about a week ago). Is no longer smoking.     In the ED, patient had progressive respiratory distress. Became diaphoretic and confused. Was also pulling oxygen mask. Patient was therefore intubated. Labwork significant for lactic acidosis of 8.7, hyperglycemia, HAGMA, hypokalemia, mild hyponatremia, hyperphosphatemia, BNP 700s, hyaline casts in UA and troponin 0.9. No ST segment elevation or depression. Cardiology and pulmonology consulted. Given fluids, empiric abx and admitted to ICU.       Overview/Hospital Course:  Mr Iverson presented with acute respiratory failure with hypercapnia/hypoxia. Intubated in the ED. Started on broad spectrum antibiotics. Blood and resp cultures obtained. No pneumothorax, large consolidation or pulmonary embolism but with moderate right sided pleural effusion and known left main bronchial cancerous mass. Given fluids. Lactic acidosis resolved. Questionable diabetic ketoacidosis given hyperglycemia and high anion gap metabolic acidosis. Placed on insulin " infusion. Also started on full dose anticoagulation, double antiplatelet therapy and statin for suspected NSTEMI. Glucose normalized and HAGMA resolved. Insulin discontinued. Fluids stopped. HgbA1c < 6% which is not consistent with diabetes. Cardiology and pulmonology consulted. Echocardiogram showed LVEF 35%-40% with grade 1 diastolic dysfunction. Furosemide IV given. Extubated to NC on 2/23. Overnight patient became hyperactive and encephalopathic. Experienced respiratory failure. Re-intubated. Diuresed. Extubated on 2/25 with precedex in place to reduce anxiety/paranoia post extubation. This worked well and remained stable on low flow NC. Plan for [facility]. PT/OT consulted for dispo.       Interval History: no acute issues, discussed at length with wife about heart cath and answered all questions. She would still like to hear back from Dr. Hathaway before proceeding.   Patient doing okay, no breathing issues.     Review of Systems   Constitutional: Negative.    Respiratory: Negative.     Cardiovascular: Negative.    Gastrointestinal: Negative.    Objective:     Vital Signs (Most Recent):  Temp: 97.6 °F (36.4 °C) (03/01/22 0849)  Pulse: (!) 114 (03/01/22 0849)  Resp: 18 (03/01/22 0849)  BP: (!) 164/93 (03/01/22 0849)  SpO2: 95 % (03/01/22 0849)   Vital Signs (24h Range):  Temp:  [97.6 °F (36.4 °C)-99.4 °F (37.4 °C)] 97.6 °F (36.4 °C)  Pulse:  [] 114  Resp:  [18] 18  SpO2:  [94 %-97 %] 95 %  BP: (136-172)/(71-98) 164/93     Weight: 86.9 kg (191 lb 9.3 oz)  Body mass index is 27.49 kg/m².    Intake/Output Summary (Last 24 hours) at 3/1/2022 1009  Last data filed at 3/1/2022 0948  Gross per 24 hour   Intake 240 ml   Output 400 ml   Net -160 ml        Physical Exam  Vitals and nursing note reviewed.   Constitutional:       General: He is not in acute distress.     Appearance: He is ill-appearing. He is not toxic-appearing.   Cardiovascular:      Rate and Rhythm: Normal rate and regular rhythm.   Pulmonary:      Effort:  Pulmonary effort is normal.      Breath sounds: No wheezing or rales.   Abdominal:      Palpations: Abdomen is soft.   Musculoskeletal:      Right lower leg: No edema.      Left lower leg: No edema.   Skin:     General: Skin is warm.      Capillary Refill: Capillary refill takes less than 2 seconds.   Neurological:      Mental Status: He is alert and oriented to person, place, and time.   Psychiatric:         Mood and Affect: Mood normal.         Behavior: Behavior normal.       Significant Labs: All pertinent labs within the past 24 hours have been reviewed.    Significant Imaging: I have reviewed all pertinent imaging results/findings within the past 24 hours.      Assessment/Plan:      * Acute respiratory failure with hypoxia and hypercapnia  Likely due to heart failure. Respiratory failure resolved   Continue furosemide in oral form.   Consult PT/OT - pending recommendations    Cardiogenic shock  See heart failure, now resolved      Acute systolic heart failure  - presented with heart failure and cardiogenic shock on admission  - EF 35% which is new, ischemic vs non-ischemic. Discussing with wife about LHC  - on asa/plavix and completed anticoagulation for 48 hours  - now euvolemic, on PO lasix      Multifocal atrial tachycardia  - noted on admit, now resolved      Palliative care encounter  Advance Care Planning     Date: 02/22/2022    [facility]  I engaged the wife in a conversation about advance care planning and we specifically addressed what the goals of care would be moving forward, in light of the patient's change in clinical status.  We did specifically address the patient's likely prognosis, which is fair .  We explored the patient's values and preferences for future care.  The wife endorses that what is most important right now is to focus on curative/life-prolongation (regardless of treatment burdens)    Accordingly, we have decided that the best plan to meet the patient's goals includes continuing with  treatment    I did not explain the role for hospice care at this stage of the patient's illness, including its ability to help the patient live with the best quality of life possible.  We will not be making a hospice referral.    I spent a total of > 15 minutes engaging the patient in this advance care planning discussion.    Dr Tran states she had a code status conversation with patient prior to intubation. Patient prefers aggressive care and full code. Wife confirms that these are his wishes.              Lactic acidosis  Resolved with fluids      Hyperglycemia   HgbA1c not consistent with diabetes. Stress induced?        High anion gap metabolic acidosis  Likely due to lactic acidosis. Resolved         Hyperphosphatemia  PTH high. Hyperphosphatemia resolved.       Hypokalemia  Replaced. Daily Potassium with furosemide      NSTEMI (non-ST elevated myocardial infarction)  Aspirin and clopidogrel given. Statin. Completed 48 hours of full dose anticoagulation. Echocardiogram significant for LVEF 35-40%, which is new. Plan for [facility]. Patient and wife would like to discuss this with his oncologist before any agreement.   BB and ACE-I when able to tolerate - start on metoprolol  - discussed at length with wife, suspect they will do as outpatient but wants to discuss with Dr. Hathaway      Primary hypertension  - slowly creeping up  - will start to add back medications, particularly GDMT      Immunodeficiency due to drugs  Noted. Is not leukopenic or neutropenic      Secondary malignant neoplasm of bone  Noted       Lung cancer, main bronchus, left  Noted. Is stage 4. On palliative chemotherapy. Followed by Dr Hathaway as outpatient      History of completed stroke  Noted       Macrocytic anemia  No indication for transfusion at this time    Former smoker  Per patient's wife, patient has been abstinent for 3 years        VTE Risk Mitigation (From admission, onward)         Ordered     enoxaparin injection 40 mg  Daily          02/24/22 1446                Discharge Planning   [place]:      Code Status: Full Code   Is the patient medically ready for discharge?:     Reason for patient still in hospital (select all that apply): Treatment  Discharge Plan A: Home with family (Patient has OP therapy sessions scheduled)                  Rolando Knott MD  Department of [facility] Medicine   [facility]

## 2022-03-01 NOTE — SUBJECTIVE & OBJECTIVE
Interval History: no acute issues, discussed at length with wife about heart cath and answered all questions. She would still like to hear back from Dr. Hathaway before proceeding.   Patient doing okay, no breathing issues.     Review of Systems   Constitutional: Negative.    Respiratory: Negative.     Cardiovascular: Negative.    Gastrointestinal: Negative.    Objective:     Vital Signs (Most Recent):  Temp: 97.6 °F (36.4 °C) (03/01/22 0849)  Pulse: (!) 114 (03/01/22 0849)  Resp: 18 (03/01/22 0849)  BP: (!) 164/93 (03/01/22 0849)  SpO2: 95 % (03/01/22 0849)   Vital Signs (24h Range):  Temp:  [97.6 °F (36.4 °C)-99.4 °F (37.4 °C)] 97.6 °F (36.4 °C)  Pulse:  [] 114  Resp:  [18] 18  SpO2:  [94 %-97 %] 95 %  BP: (136-172)/(71-98) 164/93     Weight: 86.9 kg (191 lb 9.3 oz)  Body mass index is 27.49 kg/m².    Intake/Output Summary (Last 24 hours) at 3/1/2022 1009  Last data filed at 3/1/2022 0948  Gross per 24 hour   Intake 240 ml   Output 400 ml   Net -160 ml        Physical Exam  Vitals and nursing note reviewed.   Constitutional:       General: He is not in acute distress.     Appearance: He is ill-appearing. He is not toxic-appearing.   Cardiovascular:      Rate and Rhythm: Normal rate and regular rhythm.   Pulmonary:      Effort: Pulmonary effort is normal.      Breath sounds: No wheezing or rales.   Abdominal:      Palpations: Abdomen is soft.   Musculoskeletal:      Right lower leg: No edema.      Left lower leg: No edema.   Skin:     General: Skin is warm.      Capillary Refill: Capillary refill takes less than 2 seconds.   Neurological:      Mental Status: He is alert and oriented to person, place, and time.   Psychiatric:         Mood and Affect: Mood normal.         Behavior: Behavior normal.       Significant Labs: All pertinent labs within the past 24 hours have been reviewed.    Significant Imaging: I have reviewed all pertinent imaging results/findings within the past 24 hours.

## 2022-03-01 NOTE — ASSESSMENT & PLAN NOTE
Likely due to heart failure. Respiratory failure resolved   Continue furosemide in oral form.   Consult PT/OT - pending recommendations

## 2022-03-01 NOTE — SUBJECTIVE & OBJECTIVE
Interval History:  Continues to be chest pain free.    Review of Systems   Constitutional: Positive for malaise/fatigue.   HENT: Negative.     Eyes: Negative.    Cardiovascular: Negative.    Respiratory: Negative.     Endocrine: Negative.    Hematologic/Lymphatic: Negative.    Skin: Negative.    Musculoskeletal: Negative.    Gastrointestinal: Negative.    Genitourinary: Negative.    Neurological: Negative.    Psychiatric/Behavioral: Negative.     Allergic/Immunologic: Negative.    Objective:     Vital Signs (Most Recent):  Temp: 97.6 °F (36.4 °C) (03/01/22 0849)  Pulse: (!) 114 (03/01/22 0849)  Resp: 18 (03/01/22 0849)  BP: (!) 164/93 (03/01/22 0849)  SpO2: 95 % (03/01/22 0849)   Vital Signs (24h Range):  Temp:  [97.6 °F (36.4 °C)-99.4 °F (37.4 °C)] 97.6 °F (36.4 °C)  Pulse:  [] 114  Resp:  [18] 18  SpO2:  [94 %-97 %] 95 %  BP: (136-172)/(71-98) 164/93     Weight: 86.9 kg (191 lb 9.3 oz)  Body mass index is 27.49 kg/m².     SpO2: 95 %  O2 Device (Oxygen Therapy): room air      Intake/Output Summary (Last 24 hours) at 3/1/2022 1045  Last data filed at 3/1/2022 0948  Gross per 24 hour   Intake 240 ml   Output 400 ml   Net -160 ml         Lines/Drains/Airways       Peripherally Inserted Central Catheter Line  Duration             PICC Triple Lumen 02/22/22 2020 right brachial 6 days              Central Venous Catheter Line  Duration             Port A Cath Single Lumen right subclavian -- days                    Physical Exam  Vitals reviewed.   Constitutional:       Appearance: He is well-developed.   HENT:      Head: Normocephalic.   Eyes:      Conjunctiva/sclera: Conjunctivae normal.      Pupils: Pupils are equal, round, and reactive to light.   Cardiovascular:      Rate and Rhythm: Normal rate and regular rhythm.      Heart sounds: Normal heart sounds.   Pulmonary:      Effort: Pulmonary effort is normal.      Breath sounds: Normal breath sounds.   Abdominal:      General: Bowel sounds are normal.       Palpations: Abdomen is soft.   Musculoskeletal:      Cervical back: Normal range of motion and neck supple.   Skin:     General: Skin is warm.   Neurological:      Mental Status: He is alert and oriented to person, place, and time.       Significant Labs: BMP:   Recent Labs   Lab 02/28/22 0442 02/28/22  1030 03/01/22  0400   GLU 98  --  102     --  140   K 2.8* 2.9* 3.3*   CL 97  --  98   CO2 33*  --  32*   BUN 20  --  19   CREATININE 1.1  --  1.0   CALCIUM 9.0  --  9.1   MG  --  1.7  --      , CMP   Recent Labs   Lab 02/28/22  0442 02/28/22  1030 03/01/22  0400     --  140   K 2.8* 2.9* 3.3*   CL 97  --  98   CO2 33*  --  32*   GLU 98  --  102   BUN 20  --  19   CREATININE 1.1  --  1.0   CALCIUM 9.0  --  9.1   PROT 7.5  --  7.3   ALBUMIN 3.4*  --  3.4*   BILITOT 0.8  --  0.7   ALKPHOS 73  --  79   AST 35  --  32   ALT 32  --  30   ANIONGAP 12  --  10   ESTGFRAFRICA >60  --  >60   EGFRNONAA >60  --  >60     , CBC   Recent Labs   Lab 02/28/22 0442   WBC 12.56   HGB 13.0*   HCT 40.4        , INR No results for input(s): INR, PROTIME in the last 48 hours., Lipid Panel No results for input(s): CHOL, HDL, LDLCALC, TRIG, CHOLHDL in the last 48 hours., Troponin No results for input(s): TROPONINI in the last 48 hours., and All pertinent lab results from the last 24 hours have been reviewed.    Significant Imaging: Echocardiogram: Transthoracic echo (TTE) complete (Cupid Only):   Results for orders placed or performed during the hospital encounter of 02/22/22   Echo   Result Value Ref Range    BSA 2.29 m2    TDI SEPTAL 0.04 m/s    LV LATERAL E/E' RATIO 7.29 m/s    LV SEPTAL E/E' RATIO 12.75 m/s    LA WIDTH 4.99 cm    AORTIC VALVE CUSP SEPERATION 2.31 cm    TDI LATERAL 0.07 m/s    PV PEAK VELOCITY 0.75 cm/s    LVIDd 4.49 3.5 - 6.0 cm    IVS 1.33 (A) 0.6 - 1.1 cm    Posterior Wall 1.34 (A) 0.6 - 1.1 cm    Ao root annulus 3.92 cm    LVIDs 3.80 2.1 - 4.0 cm    FS 15 28 - 44 %    LA volume 111.72 cm3    Sinus  3.83 cm    STJ 2.92 cm    Ascending aorta 3.08 cm    LV mass 230.68 g    LA size 4.42 cm    RVDD 4.46 cm    TAPSE 1.56 cm    RV S' 9.12 cm/s    Left Ventricle Relative Wall Thickness 0.60 cm    AV mean gradient 3 mmHg    AV valve area 2.95 cm2    AV Velocity Ratio 0.63     AV index (prosthetic) 0.66     MV valve area p 1/2 method 3.41 cm2    E/A ratio 1.21     Mean e' 0.06 m/s    E wave deceleration time 222.66 msec    IVRT 283.74 msec    Pulm vein S/D ratio 1.04     LVOT diameter 2.39 cm    LVOT area 4.5 cm2    LVOT peak luigi 0.70 m/s    LVOT peak VTI 13.85 cm    Ao peak luigi 1.11 m/s    Ao VTI 21.02 cm    LVOT stroke volume 62.10 cm3    AV peak gradient 5 mmHg    E/E' ratio 9.27 m/s    MV Peak E Luigi 0.51 m/s    TR Max Luigi 2.33 m/s    MV stenosis pressure 1/2 time 64.57 ms    MV Peak A Luigi 0.42 m/s    PV Peak S Luigi 0.29 m/s    PV Peak D Luigi 0.28 m/s    LV Systolic Volume 61.96 mL    LV Systolic Volume Index 28.6 mL/m2    LV Diastolic Volume 92.20 mL    LV Diastolic Volume Index 42.49 mL/m2    LA Volume Index 51.5 mL/m2    LV Mass Index 106 g/m2    RA Major Axis 5.73 cm    Left Atrium Minor Axis 6.03 cm    Left Atrium Major Axis 5.89 cm    Triscuspid Valve Regurgitation Peak Gradient 22 mmHg    RA Width 4.32 cm    EF 35 %    Narrative    · The left ventricle is normal in size with concentric hypertrophy and   moderately decreased systolic function.  · The estimated ejection fraction is 35-40%.  · Grade I left ventricular diastolic dysfunction.  · Mild right ventricular enlargement with normal right ventricular   systolic function.  · Mild left atrial enlargement.  · Mild right atrial enlargement.  · Mild tricuspid regurgitation.

## 2022-03-01 NOTE — PLAN OF CARE
Recommendations    1) Continue Cardiac Diet, encourage PO   2) Add Boost (+) BID to assist in meeting increased needs per CA dx.   3) Hypokalemia - replete PRN    Goals:   1) Patient to consistently meet > 75% EEN/EPN via PO/ONS intake  2) Monitored labs to trend toward target ranges    Nutrition Goal Status: progressing towards goal  Communication of RD Recs:  (POC)

## 2022-03-01 NOTE — ASSESSMENT & PLAN NOTE
- presented with heart failure and cardiogenic shock on admission  - EF 35% which is new, ischemic vs non-ischemic. Discussing with wife about LHC  - on asa/plavix and completed anticoagulation for 48 hours  - now euvolemic, on PO lasix

## 2022-03-02 PROBLEM — R41.0 DELIRIUM: Status: ACTIVE | Noted: 2022-03-02

## 2022-03-02 LAB
ANION GAP SERPL CALC-SCNC: 11 MMOL/L (ref 8–16)
BUN SERPL-MCNC: 22 MG/DL (ref 8–23)
CALCIUM SERPL-MCNC: 9.2 MG/DL (ref 8.7–10.5)
CHLORIDE SERPL-SCNC: 100 MMOL/L (ref 95–110)
CO2 SERPL-SCNC: 32 MMOL/L (ref 23–29)
CREAT SERPL-MCNC: 1.2 MG/DL (ref 0.5–1.4)
EST. GFR  (AFRICAN AMERICAN): >60 ML/MIN/1.73 M^2
EST. GFR  (NON AFRICAN AMERICAN): >60 ML/MIN/1.73 M^2
GLUCOSE SERPL-MCNC: 106 MG/DL (ref 70–110)
PHOSPHATE SERPL-MCNC: 3.1 MG/DL (ref 2.7–4.5)
POTASSIUM SERPL-SCNC: 3.3 MMOL/L (ref 3.5–5.1)
SODIUM SERPL-SCNC: 143 MMOL/L (ref 136–145)

## 2022-03-02 PROCEDURE — 36415 COLL VENOUS BLD VENIPUNCTURE: CPT | Performed by: INTERNAL MEDICINE

## 2022-03-02 PROCEDURE — 25000003 PHARM REV CODE 250: Performed by: STUDENT IN AN ORGANIZED HEALTH CARE EDUCATION/TRAINING PROGRAM

## 2022-03-02 PROCEDURE — 63600175 PHARM REV CODE 636 W HCPCS: Performed by: INTERNAL MEDICINE

## 2022-03-02 PROCEDURE — 25000003 PHARM REV CODE 250: Performed by: INTERNAL MEDICINE

## 2022-03-02 PROCEDURE — 97161 PT EVAL LOW COMPLEX 20 MIN: CPT

## 2022-03-02 PROCEDURE — 84100 ASSAY OF PHOSPHORUS: CPT | Performed by: INTERNAL MEDICINE

## 2022-03-02 PROCEDURE — A9585 GADOBUTROL INJECTION: HCPCS | Performed by: STUDENT IN AN ORGANIZED HEALTH CARE EDUCATION/TRAINING PROGRAM

## 2022-03-02 PROCEDURE — A4216 STERILE WATER/SALINE, 10 ML: HCPCS | Performed by: INTERNAL MEDICINE

## 2022-03-02 PROCEDURE — 21400001 HC TELEMETRY ROOM

## 2022-03-02 PROCEDURE — 80048 BASIC METABOLIC PNL TOTAL CA: CPT | Performed by: INTERNAL MEDICINE

## 2022-03-02 PROCEDURE — 25500020 PHARM REV CODE 255: Performed by: STUDENT IN AN ORGANIZED HEALTH CARE EDUCATION/TRAINING PROGRAM

## 2022-03-02 PROCEDURE — 99233 PR SUBSEQUENT HOSPITAL CARE,LEVL III: ICD-10-PCS | Mod: ,,, | Performed by: INTERNAL MEDICINE

## 2022-03-02 PROCEDURE — 99233 SBSQ HOSP IP/OBS HIGH 50: CPT | Mod: ,,, | Performed by: INTERNAL MEDICINE

## 2022-03-02 RX ORDER — GADOBUTROL 604.72 MG/ML
9 INJECTION INTRAVENOUS
Status: COMPLETED | OUTPATIENT
Start: 2022-03-02 | End: 2022-03-02

## 2022-03-02 RX ADMIN — FUROSEMIDE 40 MG: 40 TABLET ORAL at 09:03

## 2022-03-02 RX ADMIN — ASPIRIN 81 MG CHEWABLE TABLET 81 MG: 81 TABLET CHEWABLE at 09:03

## 2022-03-02 RX ADMIN — OLANZAPINE 5 MG: 5 TABLET, ORALLY DISINTEGRATING ORAL at 10:03

## 2022-03-02 RX ADMIN — METOPROLOL TARTRATE 25 MG: 25 TABLET, FILM COATED ORAL at 08:03

## 2022-03-02 RX ADMIN — ATORVASTATIN CALCIUM 80 MG: 40 TABLET, FILM COATED ORAL at 09:03

## 2022-03-02 RX ADMIN — POTASSIUM PHOSPHATE, MONOBASIC 500 MG: 500 TABLET, SOLUBLE ORAL at 09:03

## 2022-03-02 RX ADMIN — METOPROLOL TARTRATE 25 MG: 25 TABLET, FILM COATED ORAL at 09:03

## 2022-03-02 RX ADMIN — Medication 10 ML: at 12:03

## 2022-03-02 RX ADMIN — Medication 400 MG: at 09:03

## 2022-03-02 RX ADMIN — ENOXAPARIN SODIUM 40 MG: 40 INJECTION SUBCUTANEOUS at 05:03

## 2022-03-02 RX ADMIN — CLOPIDOGREL 75 MG: 75 TABLET, FILM COATED ORAL at 09:03

## 2022-03-02 RX ADMIN — GADOBUTROL 9 ML: 604.72 INJECTION INTRAVENOUS at 02:03

## 2022-03-02 NOTE — ASSESSMENT & PLAN NOTE
Likely due to heart failure. Respiratory failure resolved   Continue furosemide in oral form.   Consult PT/OT - recommending home  - now resolved

## 2022-03-02 NOTE — ASSESSMENT & PLAN NOTE
- more confused today, possible delirium?  - MRI did not show any mets or stroke or causes  - meds reviewed  - Neurology consulted

## 2022-03-02 NOTE — SUBJECTIVE & OBJECTIVE
Interval History:  Doing fine.  No chest pains.    Review of Systems   Constitutional: Negative.   HENT: Negative.     Eyes: Negative.    Cardiovascular: Negative.    Respiratory: Negative.     Endocrine: Negative.    Hematologic/Lymphatic: Negative.    Skin: Negative.    Musculoskeletal: Negative.    Gastrointestinal: Negative.    Genitourinary: Negative.    Neurological: Negative.    Psychiatric/Behavioral: Negative.     Allergic/Immunologic: Negative.    Objective:     Vital Signs (Most Recent):  Temp: 97.9 °F (36.6 °C) (03/02/22 1145)  Pulse: 85 (03/02/22 1145)  Resp: 18 (03/02/22 1145)  BP: 139/81 (03/02/22 1145)  SpO2: 97 % (03/02/22 1145) Vital Signs (24h Range):  Temp:  [97.5 °F (36.4 °C)-98.5 °F (36.9 °C)] 97.9 °F (36.6 °C)  Pulse:  [58-96] 85  Resp:  [18] 18  SpO2:  [96 %-98 %] 97 %  BP: (131-161)/(66-91) 139/81     Weight: 86.9 kg (191 lb 9.3 oz)  Body mass index is 27.49 kg/m².     SpO2: 97 %  O2 Device (Oxygen Therapy): room air      Intake/Output Summary (Last 24 hours) at 3/2/2022 1232  Last data filed at 3/2/2022 0400  Gross per 24 hour   Intake 250 ml   Output 1350 ml   Net -1100 ml       Lines/Drains/Airways       Central Venous Catheter Line  Duration             Port A Cath Single Lumen right subclavian -- days                    Physical Exam  Vitals reviewed.   Constitutional:       Appearance: He is well-developed.   HENT:      Head: Normocephalic.   Eyes:      Conjunctiva/sclera: Conjunctivae normal.      Pupils: Pupils are equal, round, and reactive to light.   Cardiovascular:      Rate and Rhythm: Normal rate and regular rhythm.      Heart sounds: Normal heart sounds.   Pulmonary:      Effort: Pulmonary effort is normal.      Breath sounds: Normal breath sounds.   Abdominal:      General: Bowel sounds are normal.      Palpations: Abdomen is soft.   Musculoskeletal:      Cervical back: Normal range of motion and neck supple.   Skin:     General: Skin is warm.   Neurological:      Mental  Status: He is alert and oriented to person, place, and time.       Significant Labs: BMP:   Recent Labs   Lab 03/01/22  0400 03/02/22  0504    106    143   K 3.3* 3.3*   CL 98 100   CO2 32* 32*   BUN 19 22   CREATININE 1.0 1.2   CALCIUM 9.1 9.2   , CMP   Recent Labs   Lab 03/01/22  0400 03/02/22  0504    143   K 3.3* 3.3*   CL 98 100   CO2 32* 32*    106   BUN 19 22   CREATININE 1.0 1.2   CALCIUM 9.1 9.2   PROT 7.3  --    ALBUMIN 3.4*  --    BILITOT 0.7  --    ALKPHOS 79  --    AST 32  --    ALT 30  --    ANIONGAP 10 11   ESTGFRAFRICA >60 >60   EGFRNONAA >60 >60   , CBC No results for input(s): WBC, HGB, HCT, PLT in the last 48 hours., INR No results for input(s): INR, PROTIME in the last 48 hours., Lipid Panel No results for input(s): CHOL, HDL, LDLCALC, TRIG, CHOLHDL in the last 48 hours., Troponin No results for input(s): TROPONINI in the last 48 hours., and All pertinent lab results from the last 24 hours have been reviewed.    Significant Imaging: Echocardiogram: Transthoracic echo (TTE) complete (Cupid Only):   Results for orders placed or performed during the hospital encounter of 02/22/22   Echo   Result Value Ref Range    BSA 2.29 m2    TDI SEPTAL 0.04 m/s    LV LATERAL E/E' RATIO 7.29 m/s    LV SEPTAL E/E' RATIO 12.75 m/s    LA WIDTH 4.99 cm    AORTIC VALVE CUSP SEPERATION 2.31 cm    TDI LATERAL 0.07 m/s    PV PEAK VELOCITY 0.75 cm/s    LVIDd 4.49 3.5 - 6.0 cm    IVS 1.33 (A) 0.6 - 1.1 cm    Posterior Wall 1.34 (A) 0.6 - 1.1 cm    Ao root annulus 3.92 cm    LVIDs 3.80 2.1 - 4.0 cm    FS 15 28 - 44 %    LA volume 111.72 cm3    Sinus 3.83 cm    STJ 2.92 cm    Ascending aorta 3.08 cm    LV mass 230.68 g    LA size 4.42 cm    RVDD 4.46 cm    TAPSE 1.56 cm    RV S' 9.12 cm/s    Left Ventricle Relative Wall Thickness 0.60 cm    AV mean gradient 3 mmHg    AV valve area 2.95 cm2    AV Velocity Ratio 0.63     AV index (prosthetic) 0.66     MV valve area p 1/2 method 3.41 cm2    E/A ratio  1.21     Mean e' 0.06 m/s    E wave deceleration time 222.66 msec    IVRT 283.74 msec    Pulm vein S/D ratio 1.04     LVOT diameter 2.39 cm    LVOT area 4.5 cm2    LVOT peak luigi 0.70 m/s    LVOT peak VTI 13.85 cm    Ao peak luigi 1.11 m/s    Ao VTI 21.02 cm    LVOT stroke volume 62.10 cm3    AV peak gradient 5 mmHg    E/E' ratio 9.27 m/s    MV Peak E Luigi 0.51 m/s    TR Max Luigi 2.33 m/s    MV stenosis pressure 1/2 time 64.57 ms    MV Peak A Luigi 0.42 m/s    PV Peak S Luigi 0.29 m/s    PV Peak D Luigi 0.28 m/s    LV Systolic Volume 61.96 mL    LV Systolic Volume Index 28.6 mL/m2    LV Diastolic Volume 92.20 mL    LV Diastolic Volume Index 42.49 mL/m2    LA Volume Index 51.5 mL/m2    LV Mass Index 106 g/m2    RA Major Axis 5.73 cm    Left Atrium Minor Axis 6.03 cm    Left Atrium Major Axis 5.89 cm    Triscuspid Valve Regurgitation Peak Gradient 22 mmHg    RA Width 4.32 cm    EF 35 %    Narrative    · The left ventricle is normal in size with concentric hypertrophy and   moderately decreased systolic function.  · The estimated ejection fraction is 35-40%.  · Grade I left ventricular diastolic dysfunction.  · Mild right ventricular enlargement with normal right ventricular   systolic function.  · Mild left atrial enlargement.  · Mild right atrial enlargement.  · Mild tricuspid regurgitation.

## 2022-03-02 NOTE — PT/OT/SLP EVAL
"Physical Therapy Evaluation and Discharge Note    Patient Name:  Kashif Iverson   MRN:  [id number]    Recommendations:     Discharge Recommendations:  home   Discharge Equipment Recommendations: none   Barriers to discharge: None    Assessment:     Kashif Iverson is a 61 y.o. male admitted with a medical diagnosis of Acute respiratory failure with hypoxia and hypercapnia. .  At this time, patient is functioning at their prior level of function and does not require further acute PT services.     Recent Surgery: * No surgery found *      Plan:     During this hospitalization, patient does not require further acute PT services.  Please re-consult if situation changes.      Subjective     Chief Complaint: "I' waiting for my wife..."  Patient/Family Comments/goals: To go home  Pain/Comfort:  · Pain Rating 1: 0/10      Living Environment:  PTA pt reports living with his wife and 2 sons in a 1 story house with no steps to enter.  Prior to admission, patients level of function was (I).  Equipment used at home: none.  DME owned (not currently used): single point cane.  Upon discharge, patient will have assistance from wife.    Objective:     Communicated with Gautam puentes prior to session.  Patient found sitting edge of bed   upon PT entry to room.    General Precautions: Standard,     Orthopedic Precautions:N/A   Braces: N/A   Respiratory Status: Room air    Exams:  · RLE ROM: WFL  · RLE Strength: WFL  · LLE ROM: WFL  · LLE Strength: WFL    Functional Mobility:  · Bed Mobility:     · Supine to Sit: modified independence  · Transfers:     · Sit to Stand:  modified independence with no AD  · Gait: 150' /s AD (S)/(I); pt also seen ambulating to/from BR (I)    AM-PAC 6 CLICK MOBILITY  Total Score:24       AM-PAC 6 CLICK MOBILITY  Total Score:24     Patient left sitting edge of bed with call button in reach.    GOALS:   Multidisciplinary Problems     Physical Therapy Goals        Problem: Physical Therapy Goal    Goal Priority " Disciplines Outcome Goal Variances Interventions   Physical Therapy Goal     PT, PT/OT      Description: Pt demonstrates safety with transfers and gait; recommend supervision due to mental status, no further skilled PT needed at this time.                   History:     Past Medical History:   Diagnosis Date    Hypertension     Lung cancer     NSCLC    Lung mass     left lung       Past Surgical History:   Procedure Laterality Date    BRONCHOSCOPY N/A 8/30/2019    Procedure: Bronchoscopy;  Surgeon: Miguel Diagnostic Provider;  Location: [facility] OR [address] [place] [address];  Service: Anesthesiology;  Laterality: N/A;       Time Tracking:     PT Received On: 03/02/22  PT Start Time: 0920     PT Stop Time: 0930  PT Total Time (min): 10 min     Billable Minutes: Evaluation 10      03/02/2022

## 2022-03-02 NOTE — NURSING
Received report from [place]. Discussed plan of care, pain management and safety measures with patient. Patient pleasantly confused and require frequent reorientation and instructions. Patient denies discomfort and appears to be resting comfortably.

## 2022-03-02 NOTE — NURSING
Patient pulled PICC line out from Right arm- catheter intact-  All 40 cm present -no apparent shearing. Patient is free of pain and is not in any respiratory distress.. Previous site free of bleeding. Ecchymosis to arm was present before occurrence. Dr Zapata was alerted -no new orders received. Will let PMD decide in am if new PICC line needs to be placed.

## 2022-03-02 NOTE — TELEPHONE ENCOUNTER
Wife of patient called to get advice on patient for an inpatient angiogram procedure tomorrow during patient currently admit. Wife of patient was advise that a message will be sent to  and someone from our office will contact her back with an response. Wife of patient verbalized understanding.

## 2022-03-02 NOTE — PT/OT/SLP DISCHARGE
Occupational Therapy Discharge Summary    Kashif Iverson  MRN: [id number]   Principal Problem: Acute respiratory failure with hypoxia and hypercapnia      Patient Discharged from acute Occupational Therapy on 3/2/22.  Please refer to prior OT notes for functional status.    Assessment:      The patiet was seen by PT asa and is (I) with ambulation. The patient is ambulating to the bathroom ad faye and will be D/C from OT.    Objective:     GOALS:   Multidisciplinary Problems     Occupational Therapy Goals        Problem: Occupational Therapy Goal    Goal Priority Disciplines Outcome Interventions   Occupational Therapy Goal     OT, PT/OT Ongoing, Progressing    Description: Goals to be met by: 3/15/22     Patient will increase functional independence with ADLs by performing:    UE Dressing with Modified [place].  LE Dressing with Modified [place].  Grooming while standing at sink with Modified [place] and Assistive Devices as needed.  Supine to sit with Modified [place].  Step transfer with Modified [place]  Toilet transfer to toilet with Modified [place].  Upper extremity exercise program x15 reps per handout, with independence.  Educate the patient in Home Safety/Fall Prevention                   Reasons for Discontinuation of Therapy Services  No needs in the acute setting.      Plan:     Patient Discharged to: home with family    3/2/2022

## 2022-03-02 NOTE — PLAN OF CARE
Patient was not in room at time of SW visit to complete reassessment. SW will continue to follow up with completing reassessment.

## 2022-03-02 NOTE — PROGRESS NOTES
[facility] Telemetry  Cardiology  Progress Note    Patient Name: Kashif Iverson  MRN: [id number]  Admission Date: 2/22/2022  Hospital Length of Stay: 8 days  Code Status: Full Code   Attending Physician: Rolando Knott MD   Primary Care Physician: Eduardo Ruiz MD  Expected Discharge Date:   Principal Problem:Acute respiratory failure with hypoxia and hypercapnia    Subjective:           Interval History:  Doing fine.  No chest pains.    Review of Systems   Constitutional: Negative.   HENT: Negative.     Eyes: Negative.    Cardiovascular: Negative.    Respiratory: Negative.     Endocrine: Negative.    Hematologic/Lymphatic: Negative.    Skin: Negative.    Musculoskeletal: Negative.    Gastrointestinal: Negative.    Genitourinary: Negative.    Neurological: Negative.    Psychiatric/Behavioral: Negative.     Allergic/Immunologic: Negative.    Objective:     Vital Signs (Most Recent):  Temp: 97.9 °F (36.6 °C) (03/02/22 1145)  Pulse: 85 (03/02/22 1145)  Resp: 18 (03/02/22 1145)  BP: 139/81 (03/02/22 1145)  SpO2: 97 % (03/02/22 1145) Vital Signs (24h Range):  Temp:  [97.5 °F (36.4 °C)-98.5 °F (36.9 °C)] 97.9 °F (36.6 °C)  Pulse:  [58-96] 85  Resp:  [18] 18  SpO2:  [96 %-98 %] 97 %  BP: (131-161)/(66-91) 139/81     Weight: 86.9 kg (191 lb 9.3 oz)  Body mass index is 27.49 kg/m².     SpO2: 97 %  O2 Device (Oxygen Therapy): room air      Intake/Output Summary (Last 24 hours) at 3/2/2022 1232  Last data filed at 3/2/2022 0400  Gross per 24 hour   Intake 250 ml   Output 1350 ml   Net -1100 ml       Lines/Drains/Airways       Central Venous Catheter Line  Duration             Port A Cath Single Lumen right subclavian -- days                    Physical Exam  Vitals reviewed.   Constitutional:       Appearance: He is well-developed.   HENT:      Head: Normocephalic.   Eyes:      Conjunctiva/sclera: Conjunctivae normal.      Pupils: Pupils are equal, round, and reactive to light.   Cardiovascular:      Rate and Rhythm: Normal rate  and regular rhythm.      Heart sounds: Normal heart sounds.   Pulmonary:      Effort: Pulmonary effort is normal.      Breath sounds: Normal breath sounds.   Abdominal:      General: Bowel sounds are normal.      Palpations: Abdomen is soft.   Musculoskeletal:      Cervical back: Normal range of motion and neck supple.   Skin:     General: Skin is warm.   Neurological:      Mental Status: He is alert and oriented to person, place, and time.       Significant Labs: BMP:   Recent Labs   Lab 03/01/22  0400 03/02/22  0504    106    143   K 3.3* 3.3*   CL 98 100   CO2 32* 32*   BUN 19 22   CREATININE 1.0 1.2   CALCIUM 9.1 9.2   , CMP   Recent Labs   Lab 03/01/22  0400 03/02/22  0504    143   K 3.3* 3.3*   CL 98 100   CO2 32* 32*    106   BUN 19 22   CREATININE 1.0 1.2   CALCIUM 9.1 9.2   PROT 7.3  --    ALBUMIN 3.4*  --    BILITOT 0.7  --    ALKPHOS 79  --    AST 32  --    ALT 30  --    ANIONGAP 10 11   ESTGFRAFRICA >60 >60   EGFRNONAA >60 >60   , CBC No results for input(s): WBC, HGB, HCT, PLT in the last 48 hours., INR No results for input(s): INR, PROTIME in the last 48 hours., Lipid Panel No results for input(s): CHOL, HDL, LDLCALC, TRIG, CHOLHDL in the last 48 hours., Troponin No results for input(s): TROPONINI in the last 48 hours., and All pertinent lab results from the last 24 hours have been reviewed.    Significant Imaging: Echocardiogram: Transthoracic echo (TTE) complete (Cupid Only):   Results for orders placed or performed during the hospital encounter of 02/22/22   Echo   Result Value Ref Range    BSA 2.29 m2    TDI SEPTAL 0.04 m/s    LV LATERAL E/E' RATIO 7.29 m/s    LV SEPTAL E/E' RATIO 12.75 m/s    LA WIDTH 4.99 cm    AORTIC VALVE CUSP SEPERATION 2.31 cm    TDI LATERAL 0.07 m/s    PV PEAK VELOCITY 0.75 cm/s    LVIDd 4.49 3.5 - 6.0 cm    IVS 1.33 (A) 0.6 - 1.1 cm    Posterior Wall 1.34 (A) 0.6 - 1.1 cm    Ao root annulus 3.92 cm    LVIDs 3.80 2.1 - 4.0 cm    FS 15 28 - 44 %     LA volume 111.72 cm3    Sinus 3.83 cm    STJ 2.92 cm    Ascending aorta 3.08 cm    LV mass 230.68 g    LA size 4.42 cm    RVDD 4.46 cm    TAPSE 1.56 cm    RV S' 9.12 cm/s    Left Ventricle Relative Wall Thickness 0.60 cm    AV mean gradient 3 mmHg    AV valve area 2.95 cm2    AV Velocity Ratio 0.63     AV index (prosthetic) 0.66     MV valve area p 1/2 method 3.41 cm2    E/A ratio 1.21     Mean e' 0.06 m/s    E wave deceleration time 222.66 msec    IVRT 283.74 msec    Pulm vein S/D ratio 1.04     LVOT diameter 2.39 cm    LVOT area 4.5 cm2    LVOT peak luigi 0.70 m/s    LVOT peak VTI 13.85 cm    Ao peak luigi 1.11 m/s    Ao VTI 21.02 cm    LVOT stroke volume 62.10 cm3    AV peak gradient 5 mmHg    E/E' ratio 9.27 m/s    MV Peak E Luigi 0.51 m/s    TR Max Luigi 2.33 m/s    MV stenosis pressure 1/2 time 64.57 ms    MV Peak A Luigi 0.42 m/s    PV Peak S Luigi 0.29 m/s    PV Peak D Luigi 0.28 m/s    LV Systolic Volume 61.96 mL    LV Systolic Volume Index 28.6 mL/m2    LV Diastolic Volume 92.20 mL    LV Diastolic Volume Index 42.49 mL/m2    LA Volume Index 51.5 mL/m2    LV Mass Index 106 g/m2    RA Major Axis 5.73 cm    Left Atrium Minor Axis 6.03 cm    Left Atrium Major Axis 5.89 cm    Triscuspid Valve Regurgitation Peak Gradient 22 mmHg    RA Width 4.32 cm    EF 35 %    Narrative    · The left ventricle is normal in size with concentric hypertrophy and   moderately decreased systolic function.  · The estimated ejection fraction is 35-40%.  · Grade I left ventricular diastolic dysfunction.  · Mild right ventricular enlargement with normal right ventricular   systolic function.  · Mild left atrial enlargement.  · Mild right atrial enlargement.  · Mild tricuspid regurgitation.        Assessment and Plan:     Brief HPI:     * Acute respiratory failure with hypoxia and hypercapnia  Extubated today.    Multifocal atrial tachycardia  New Dx. Related to underlying pulmonary issues. Now resolved    High anion gap metabolic acidosis  Being  treated for possible DKA    NSTEMI (non-ST elevated myocardial infarction)  Peak troponin 1.7. Suspect demand ischemia from tachycardia, respiratory distress and acidosis. Echo with EF 35-40% ? Tachymyopathy. Will discuss ischemic evaluation when more stable.    Discussed with patient and wife.  They would like to discuss with the oncologist before making a decision about coronary angiogram.        Lung cancer, main bronchus, left  Per primary    Former smoker            VTE Risk Mitigation (From admission, onward)         Ordered     enoxaparin injection 40 mg  Daily         02/24/22 3619                Catia Roman MD  Cardiology  [facility] - Telemetry

## 2022-03-02 NOTE — PROGRESS NOTES
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 Patient is currently intubated and unable to provide history. His wife, who is at bedside, states he was in his normal state up until today. States he had been eating and drinking just fine but did have to hide his "coke" which he drinks in excess. Apparently had "coke" again this AM as he felt that would help his breathing. He is currently on palliative chemotherapy with Gemcitabine, last dose given 2/15/2022 (about a week ago). Is no longer smoking.     In the ED, patient had progressive respiratory distress. Became diaphoretic and confused. Was also pulling oxygen mask. Patient was therefore intubated. Labwork significant for lactic acidosis of 8.7, hyperglycemia, HAGMA, hypokalemia, mild hyponatremia, hyperphosphatemia, BNP 700s, hyaline casts in UA and troponin 0.9. No ST segment elevation or depression. Cardiology and pulmonology consulted. Given fluids, empiric abx and admitted to ICU.       Overview/Hospital Course:  Mr Iverson presented with acute respiratory failure with hypercapnia/hypoxia. Intubated in the ED. Started on broad spectrum antibiotics. Blood and resp cultures obtained. No pneumothorax, large consolidation or pulmonary embolism but with moderate right sided pleural effusion and known left main bronchial cancerous mass. Given fluids. Lactic acidosis resolved. Questionable diabetic ketoacidosis given hyperglycemia and high anion gap metabolic acidosis. Placed on insulin " infusion. Also started on full dose anticoagulation, double antiplatelet therapy and statin for suspected NSTEMI. Glucose normalized and HAGMA resolved. Insulin discontinued. Fluids stopped. HgbA1c < 6% which is not consistent with diabetes. Cardiology and pulmonology consulted. Echocardiogram showed LVEF 35%-40% with grade 1 diastolic dysfunction. Furosemide IV given. Extubated to NC on 2/23. Overnight patient became hyperactive and encephalopathic. Experienced respiratory failure. Re-intubated. Diuresed. Extubated on 2/25 with precedex in place to reduce anxiety/paranoia post extubation. This worked well and remained stable on low flow NC. Plan for [facility]. PT/OT consulted for dispo.       Interval History: doing okay today, seems more confused. Waiting on wife's decision still as patient is unable to make it for himself.     Review of Systems   Constitutional: Negative.    Respiratory: Negative.     Cardiovascular: Negative.    Gastrointestinal: Negative.    Objective:     Vital Signs (Most Recent):  Temp: 97.9 °F (36.6 °C) (03/02/22 1145)  Pulse: 85 (03/02/22 1145)  Resp: 18 (03/02/22 1145)  BP: 139/81 (03/02/22 1145)  SpO2: 97 % (03/02/22 1145)   Vital Signs (24h Range):  Temp:  [97.5 °F (36.4 °C)-98.5 °F (36.9 °C)] 97.9 °F (36.6 °C)  Pulse:  [58-96] 85  Resp:  [18] 18  SpO2:  [96 %-98 %] 97 %  BP: (131-161)/(66-91) 139/81     Weight: 86.9 kg (191 lb 9.3 oz)  Body mass index is 27.49 kg/m².    Intake/Output Summary (Last 24 hours) at 3/2/2022 1630  Last data filed at 3/2/2022 1145  Gross per 24 hour   Intake 610 ml   Output 1350 ml   Net -740 ml        Physical Exam  Vitals and nursing note reviewed.   Constitutional:       General: He is not in acute distress.     Appearance: He is ill-appearing. He is not toxic-appearing.   Cardiovascular:      Rate and Rhythm: Normal rate and regular rhythm.   Pulmonary:      Effort: Pulmonary effort is normal.      Breath sounds: No wheezing or rales.   Abdominal:       Palpations: Abdomen is soft.   Musculoskeletal:      Right lower leg: No edema.      Left lower leg: No edema.   Skin:     General: Skin is warm.      Capillary Refill: Capillary refill takes less than 2 seconds.   Neurological:      Mental Status: He is alert. He is confused.   Psychiatric:         Mood and Affect: Mood normal.         Behavior: Behavior normal.       Significant Labs: All pertinent labs within the past 24 hours have been reviewed.    Significant Imaging: I have reviewed all pertinent imaging results/findings within the past 24 hours.      Assessment/Plan:      * Acute respiratory failure with hypoxia and hypercapnia  Likely due to heart failure. Respiratory failure resolved   Continue furosemide in oral form.   Consult PT/OT - recommending home  - now resolved    Delirium  - more confused today, possible delirium?  - MRI did not show any mets or stroke or causes  - meds reviewed  - Neurology consulted      Cardiogenic shock  See heart failure, now resolved      Acute systolic heart failure  - presented with heart failure and cardiogenic shock on admission  - EF 35% which is new, ischemic vs non-ischemic. Discussing with wife about C  - on asa/plavix and completed anticoagulation for 48 hours  - now euvolemic, on PO lasix      Multifocal atrial tachycardia  - noted on admit, now resolved      Palliative care encounter  Advance Care Planning     Date: 02/22/2022    [facility]  I engaged the wife in a conversation about advance care planning and we specifically addressed what the goals of care would be moving forward, in light of the patient's change in clinical status.  We did specifically address the patient's likely prognosis, which is fair .  We explored the patient's values and preferences for future care.  The wife endorses that what is most important right now is to focus on curative/life-prolongation (regardless of treatment burdens)    Accordingly, we have decided that the best plan to meet the  patient's goals includes continuing with treatment    I did not explain the role for hospice care at this stage of the patient's illness, including its ability to help the patient live with the best quality of life possible.  We will not be making a hospice referral.    I spent a total of > 15 minutes engaging the patient in this advance care planning discussion.    Dr Tran states she had a code status conversation with patient prior to intubation. Patient prefers aggressive care and full code. Wife confirms that these are his wishes.              Lactic acidosis  Resolved with fluids      Hyperglycemia   HgbA1c not consistent with diabetes. Stress induced?        High anion gap metabolic acidosis  Likely due to lactic acidosis. Resolved         Hyperphosphatemia  PTH high. Hyperphosphatemia resolved.       Hypokalemia  Replaced. Daily Potassium with furosemide      NSTEMI (non-ST elevated myocardial infarction)  Aspirin and clopidogrel given. Statin. Completed 48 hours of full dose anticoagulation. Echocardiogram significant for LVEF 35-40%, which is new. Plan for [facility]. Patient and wife would like to discuss this with his oncologist before any agreement.   BB and ACE-I when able to tolerate - start on metoprolol  - discussed at length with wife, suspect they will do as outpatient but wants to discuss with Dr. Hathaway  - pending discussion with Dr. Hathaway still - discussed with wife      Primary hypertension  - slowly creeping up  - will start to add back medications, particularly GDMT      Immunodeficiency due to drugs  Noted. Is not leukopenic or neutropenic      Secondary malignant neoplasm of bone  Noted       Lung cancer, main bronchus, left  Noted. Is stage 4. On palliative chemotherapy. Followed by Dr Hathaway as outpatient      History of completed stroke  Noted   - repeat MRI notes stable findings    Macrocytic anemia  No indication for transfusion at this time    Former smoker  Per patient's wife, patient has been  abstinent for 3 years        VTE Risk Mitigation (From admission, onward)         Ordered     enoxaparin injection 40 mg  Daily         02/24/22 1446                Discharge Planning   AZ:      Code Status: Full Code   Is the patient medically ready for discharge?:     Reason for patient still in hospital (select all that apply): Treatment  Discharge Plan A: Home with family (Patient has OP therapy sessions scheduled)                  Rolando Knott MD  Department of [facility] Medicine   [facility]

## 2022-03-02 NOTE — ASSESSMENT & PLAN NOTE
Aspirin and clopidogrel given. Statin. Completed 48 hours of full dose anticoagulation. Echocardiogram significant for LVEF 35-40%, which is new. Plan for C. Patient and wife would like to discuss this with his oncologist before any agreement.   BB and ACE-I when able to tolerate - start on metoprolol  - discussed at length with wife, suspect they will do as outpatient but wants to discuss with Dr. Hathaway  - pending discussion with Dr. Hathaway still - discussed with wife

## 2022-03-02 NOTE — SUBJECTIVE & OBJECTIVE
Interval History: doing okay today, seems more confused. Waiting on wife's decision still as patient is unable to make it for himself.     Review of Systems   Constitutional: Negative.    Respiratory: Negative.     Cardiovascular: Negative.    Gastrointestinal: Negative.    Objective:     Vital Signs (Most Recent):  Temp: 97.9 °F (36.6 °C) (03/02/22 1145)  Pulse: 85 (03/02/22 1145)  Resp: 18 (03/02/22 1145)  BP: 139/81 (03/02/22 1145)  SpO2: 97 % (03/02/22 1145)   Vital Signs (24h Range):  Temp:  [97.5 °F (36.4 °C)-98.5 °F (36.9 °C)] 97.9 °F (36.6 °C)  Pulse:  [58-96] 85  Resp:  [18] 18  SpO2:  [96 %-98 %] 97 %  BP: (131-161)/(66-91) 139/81     Weight: 86.9 kg (191 lb 9.3 oz)  Body mass index is 27.49 kg/m².    Intake/Output Summary (Last 24 hours) at 3/2/2022 1630  Last data filed at 3/2/2022 1145  Gross per 24 hour   Intake 610 ml   Output 1350 ml   Net -740 ml        Physical Exam  Vitals and nursing note reviewed.   Constitutional:       General: He is not in acute distress.     Appearance: He is ill-appearing. He is not toxic-appearing.   Cardiovascular:      Rate and Rhythm: Normal rate and regular rhythm.   Pulmonary:      Effort: Pulmonary effort is normal.      Breath sounds: No wheezing or rales.   Abdominal:      Palpations: Abdomen is soft.   Musculoskeletal:      Right lower leg: No edema.      Left lower leg: No edema.   Skin:     General: Skin is warm.      Capillary Refill: Capillary refill takes less than 2 seconds.   Neurological:      Mental Status: He is alert. He is confused.   Psychiatric:         Mood and Affect: Mood normal.         Behavior: Behavior normal.       Significant Labs: All pertinent labs within the past 24 hours have been reviewed.    Significant Imaging: I have reviewed all pertinent imaging results/findings within the past 24 hours.

## 2022-03-02 NOTE — PLAN OF CARE
Patient remained confused and would forget to call staff when he neededto urinate. He was close to nursing station so staff was alerted immediately when he would start to get up. Patient gien frequent prompts to call staff before getting up and to not pull at PICC line or telemetry monitor, but he would forget and do so. IV was not restarted as per Dr Zapata, awaiting PMD to determine need for new PICC line. . Otherwise patient rested between care.  Problem: Fall Injury Risk  Goal: Absence of Fall and Fall-Related Injury  Outcome: Ongoing, Progressing  Intervention: Identify and Manage Contributors  Flowsheets (Taken 3/2/2022 0610)  Self-Care Promotion:   BADL personal objects within reach   BADL personal routines maintained   safe use of adaptive equipment encouraged  Medication Review/Management: medications reviewed  Intervention: Promote Injury-Free Environment  Flowsheets (Taken 3/2/2022 0610)  Safety Promotion/Fall Prevention:   assistive device/personal item within reach   bed alarm set   commode/urinal/bedpan at bedside   diversional activities provided   Fall Risk reviewed with patient/family   Fall Risk signage in place   lighting adjusted   room near unit station   side rails raised x 3   supervised activity   Supervised toileting - stay within arms reach   instructed to call staff for mobility     Problem: Coping Ineffective  Goal: Effective Coping  Outcome: Ongoing, Progressing  Intervention: Support and Enhance Coping Strategies  Flowsheets (Taken 3/2/2022 0610)  Environmental Support:   calm environment promoted   caregiver consistency promoted   distractions minimized   environmental consistency promoted   personal routine supported   rest periods encouraged

## 2022-03-02 NOTE — TELEPHONE ENCOUNTER
Spoke to wife. Explained purpose of the procedure. Reviewed chart with her. Told her procedure unlikely to cause cancer to spread more.  Told her to ask cardiologist questions about sedation during the procedure.  For life expectancy, told her that he has exceeded expectations and is in the top percentile of lung cancer survivors.  Told her that I anticipate him to be alive for at least another year if this chemotherapy is still working.    She is agreeable with the plan and will talk to the cardiologist.    Mario Hathaway MD  Hematology Oncology

## 2022-03-03 LAB — PHOSPHATE SERPL-MCNC: 2.5 MG/DL (ref 2.7–4.5)

## 2022-03-03 PROCEDURE — 25000003 PHARM REV CODE 250: Performed by: STUDENT IN AN ORGANIZED HEALTH CARE EDUCATION/TRAINING PROGRAM

## 2022-03-03 PROCEDURE — 21400001 HC TELEMETRY ROOM

## 2022-03-03 PROCEDURE — 36415 COLL VENOUS BLD VENIPUNCTURE: CPT | Performed by: INTERNAL MEDICINE

## 2022-03-03 PROCEDURE — A4216 STERILE WATER/SALINE, 10 ML: HCPCS | Performed by: INTERNAL MEDICINE

## 2022-03-03 PROCEDURE — 99222 1ST HOSP IP/OBS MODERATE 55: CPT | Mod: ,,, | Performed by: PSYCHIATRY & NEUROLOGY

## 2022-03-03 PROCEDURE — 99222 PR INITIAL HOSPITAL CARE,LEVL II: ICD-10-PCS | Mod: ,,, | Performed by: PSYCHIATRY & NEUROLOGY

## 2022-03-03 PROCEDURE — 84100 ASSAY OF PHOSPHORUS: CPT | Performed by: INTERNAL MEDICINE

## 2022-03-03 PROCEDURE — 25000003 PHARM REV CODE 250: Performed by: INTERNAL MEDICINE

## 2022-03-03 PROCEDURE — 99233 PR SUBSEQUENT HOSPITAL CARE,LEVL III: ICD-10-PCS | Mod: ,,, | Performed by: INTERNAL MEDICINE

## 2022-03-03 PROCEDURE — 99233 SBSQ HOSP IP/OBS HIGH 50: CPT | Mod: ,,, | Performed by: INTERNAL MEDICINE

## 2022-03-03 PROCEDURE — 63600175 PHARM REV CODE 636 W HCPCS: Performed by: INTERNAL MEDICINE

## 2022-03-03 RX ADMIN — Medication 400 MG: at 09:03

## 2022-03-03 RX ADMIN — ATORVASTATIN CALCIUM 80 MG: 40 TABLET, FILM COATED ORAL at 09:03

## 2022-03-03 RX ADMIN — METOPROLOL TARTRATE 25 MG: 25 TABLET, FILM COATED ORAL at 08:03

## 2022-03-03 RX ADMIN — ASPIRIN 81 MG CHEWABLE TABLET 81 MG: 81 TABLET CHEWABLE at 09:03

## 2022-03-03 RX ADMIN — OLANZAPINE 5 MG: 5 TABLET, ORALLY DISINTEGRATING ORAL at 08:03

## 2022-03-03 RX ADMIN — POTASSIUM PHOSPHATE, MONOBASIC 500 MG: 500 TABLET, SOLUBLE ORAL at 09:03

## 2022-03-03 RX ADMIN — METOPROLOL TARTRATE 25 MG: 25 TABLET, FILM COATED ORAL at 09:03

## 2022-03-03 RX ADMIN — FUROSEMIDE 40 MG: 40 TABLET ORAL at 09:03

## 2022-03-03 RX ADMIN — CLOPIDOGREL 75 MG: 75 TABLET, FILM COATED ORAL at 09:03

## 2022-03-03 RX ADMIN — ENOXAPARIN SODIUM 40 MG: 40 INJECTION SUBCUTANEOUS at 05:03

## 2022-03-03 NOTE — SUBJECTIVE & OBJECTIVE
Interval History: confusion improved, discussed with wife at bedside. Plan for [facility] tomorrow.    Review of Systems   Constitutional: Negative.    Respiratory: Negative.     Cardiovascular: Negative.    Gastrointestinal: Negative.    Objective:     Vital Signs (Most Recent):  Temp: 97.9 °F (36.6 °C) (03/03/22 1225)  Pulse: 88 (03/03/22 1225)  Resp: 18 (03/03/22 1225)  BP: 133/76 (03/03/22 1225)  SpO2: 99 % (03/03/22 1225)   Vital Signs (24h Range):  Temp:  [96.1 °F (35.6 °C)-97.9 °F (36.6 °C)] 97.9 °F (36.6 °C)  Pulse:  [] 88  Resp:  [18-19] 18  SpO2:  [97 %-100 %] 99 %  BP: (116-136)/(63-80) 133/76     Weight: 86.9 kg (191 lb 9.3 oz)  Body mass index is 27.49 kg/m².    Intake/Output Summary (Last 24 hours) at 3/3/2022 1426  Last data filed at 3/3/2022 0600  Gross per 24 hour   Intake 600 ml   Output --   Net 600 ml        Physical Exam  Vitals and nursing note reviewed.   Constitutional:       General: He is not in acute distress.     Appearance: He is ill-appearing. He is not toxic-appearing.   Cardiovascular:      Rate and Rhythm: Normal rate and regular rhythm.   Pulmonary:      Effort: Pulmonary effort is normal.      Breath sounds: No wheezing or rales.   Abdominal:      Palpations: Abdomen is soft.   Musculoskeletal:      Right lower leg: No edema.      Left lower leg: No edema.   Skin:     General: Skin is warm.      Capillary Refill: Capillary refill takes less than 2 seconds.   Neurological:      Mental Status: He is alert.      Comments: Oriented to self and situation but does not know the date   Psychiatric:         Mood and Affect: Mood normal.         Behavior: Behavior normal.       Significant Labs: All pertinent labs within the past 24 hours have been reviewed.    Significant Imaging: I have reviewed all pertinent imaging results/findings within the past 24 hours.

## 2022-03-03 NOTE — ASSESSMENT & PLAN NOTE
Patient oncologist has cleared him for cardiac catheterization.  Wife interested in getting cardiac catheterization.  However patient appears to be slightly confused.  Wants his wife to make the decision about cardiac catheterization.  Talked to wife about cardiac catheterization.  Will keep an eye on his mental status and perform cardiac catheterization either as an inpatient or outpatient based on his mental status

## 2022-03-03 NOTE — NURSING
RAPID RESPONSE NURSE PROACTIVE ROUNDING NOTE       Time of Visit:     Admit Date: 2022  LOS: 9  Code Status: Full Code   Date of Visit: 2022  : 1960  Age: 61 y.o.  Sex: male  Race: White  Bed: W305/W305 A:   MRN: [id number]  Was the patient discharged from an ICU this admission? Yes   Was the patient discharged from a PACU within last 24 hours? No   Did the patient receive conscious sedation/general anesthesia in last 24 hours? No   Was the patient in the ED within the past 24 hours? No   Was the patient on NIPPV within the past 24 hours? No   Attending Physician: Rolando Knott MD  Primary Service: Hospitalist   Time spent at the bedside: < 15 min    SITUATION    Notified by chart review, MEWS 4  Reason for alert: MEWS 4,     Diagnosis: Acute respiratory failure with hypoxia and hypercapnia   has a past medical history of Hypertension, Lung cancer, and Lung mass.    Last Vitals:  Temp: 97.2 °F (36.2 °C) (2313)  Pulse: 79 (2313)  Resp: 18 (2313)  BP: 136/77 (2313)  SpO2: 100 % (2313)    24 Hour Vitals Range:  Temp:  [96.1 °F (35.6 °C)-98.3 °F (36.8 °C)]   Pulse:  []   Resp:  [18-19]   BP: (116-139)/(77-91)   SpO2:  [96 %-100 %]     Clinical Issues: Respiratory    ASSESSMENT/INTERVENTIONS    Pt found sitting on side of bed in NAD. Pt stated was feeling ok and was listening to basketball game. Reassess HR and SpO2,  and SpO2 98%. PO metoprolol scheduled for PM as well. Pt was confused to place and situation. Spoke with charge RNPedro Pablo, who stated pt has been confused, not a new finding. Will monitor.    RECOMMENDATIONS  Monitor.    Discussed plan of care with Charge Pedro Pablo PICKARD    PROVIDER ESCALATION    Physician escalation: No    Orders received and case discussed with NA.    Disposition:Remain in room 305    FOLLOW UP    Call back the Rapid Response NurseSherri at [phone] for additional questions or concerns.

## 2022-03-03 NOTE — CONSULTS
[facility] - Telemetry  Neurology  Consult Note    Patient Name: Kashif Iverson  MRN: [id number]  Admission Date: 2/22/2022  Hospital Length of Stay: 9 days  Code Status: Full Code   Attending Provider: Rolando Knott MD   Consulting Provider: José Mendoza MD  Primary Care Physician: Eduardo Ruiz MD  Principal Problem:Acute respiratory failure with hypoxia and hypercapnia    Inpatient consult to Neurology  Consult performed by: José Mendoza MD  Consult ordered by: Rolando Knott MD        Subjective:     Chief Complaint:  AMS     HPI: 62 y/o male with medical Hx of lung CA, HTN comes to ED for shortness of breath. Found to be be in respiratory failure with hypoxia/hypercapnia. Pt was intubated. Stay complicated by NSTEMI and hyperglycemia. Mr. Iverson was later extubated and transferred to floor after being successfully treated. Pt noted to have periods of confusion and even agitation. No seizures or unlateral weakness.     Past Medical History:   Diagnosis Date    Hypertension     Lung cancer     NSCLC    Lung mass     left lung       Past Surgical History:   Procedure Laterality Date    BRONCHOSCOPY N/A 8/30/2019    Procedure: Bronchoscopy;  Surgeon: Miguel Diagnostic Provider;  Location: [facility] OR [address] [place] [address];  Service: Anesthesiology;  Laterality: N/A;       Review of patient's allergies indicates:  No Known Allergies    Current Neurological Medications:     No current facility-administered medications on file prior to encounter.     Current Outpatient Medications on File Prior to Encounter   Medication Sig    amLODIPine (NORVASC) 10 MG tablet Take 1 tablet (10 mg total) by mouth once daily.    ascorbic acid-multivit-min (VITAMIN C ENERGY BOOSTER) 1,000 mg PwEP Take by mouth. Patient takes 3,000 mg per day    atenoloL (TENORMIN) 50 MG tablet Take 1 tablet (50 mg total) by mouth once daily.    LIDOcaine-prilocaine (EMLA) cream Apply generously to port site 30-60 min prior to chemo and then cover with saran wrap.     ondansetron (ZOFRAN) 8 MG tablet Take 1 tablet (8 mg total) by mouth 4 (four) times daily as needed for Nausea.      Family History     Problem Relation (Age of Onset)    Cancer Father, Sister    Diabetes Mother    Heart defect Mother    No Known Problems Son        Tobacco Use    Smoking status: Former Smoker     Packs/day: 1.00     Years: 25.00     Pack years: 25.00     Types: Cigarettes     Quit date:      Years since quittin.1    Smokeless tobacco: Never Used   Substance and Sexual Activity    Alcohol use: Yes     Comment: ocassionally    Drug use: Never    Sexual activity: Yes     Partners: Female     Review of Systems   Constitutional: Negative for fever.   HENT: Negative for trouble swallowing.    Eyes: Negative for photophobia.   Respiratory: Negative for shortness of breath.    Cardiovascular: Negative for chest pain.   Gastrointestinal: Negative for abdominal pain.   Genitourinary: Negative for flank pain.   Musculoskeletal: Negative for back pain.   Neurological: Negative for headaches.     Objective:     Vital Signs (Most Recent):  Temp: 97.9 °F (36.6 °C) (22 1225)  Pulse: 88 (22 1225)  Resp: 18 (22 1225)  BP: 133/76 (22 1225)  SpO2: 99 % (22 1225) Vital Signs (24h Range):  Temp:  [96.1 °F (35.6 °C)-97.9 °F (36.6 °C)] 97.9 °F (36.6 °C)  Pulse:  [] 88  Resp:  [18-19] 18  SpO2:  [97 %-100 %] 99 %  BP: (116-136)/(63-80) 133/76     Weight: 86.9 kg (191 lb 9.3 oz)  Body mass index is 27.49 kg/m².    Physical Exam  Constitutional:       General: He is not in acute distress.  HENT:      Head: Normocephalic.   Eyes:      General:         Right eye: No discharge.         Left eye: No discharge.   Cardiovascular:      Rate and Rhythm: Normal rate.   Pulmonary:      Breath sounds: Normal breath sounds.   Abdominal:      Palpations: Abdomen is soft.   Musculoskeletal:         General: No tenderness.      Cervical back: Neck supple.   Skin:     General: Skin is  warm.   Neurological:      Deep Tendon Reflexes: Strength normal.   Psychiatric:         Speech: Speech normal.         NEUROLOGICAL EXAMINATION:     MENTAL STATUS   Speech: speech is normal   Level of consciousness: alert       Oriented to person, place     CRANIAL NERVES     CN III, IV, VI   Right pupil: Size: 2 mm. Shape: regular.   Left pupil: Size: 2 mm. Shape: regular.   Ophthalmoparesis: none  Conjugate gaze: present    CN VII   Right facial weakness: none  Left facial weakness: none    CN XII   Tongue deviation: none    MOTOR EXAM     Strength   Strength 5/5 throughout.       Significant Labs:   CBC: No results for input(s): WBC, HGB, HCT, PLT in the last 48 hours.  CMP:   Recent Labs   Lab 03/02/22  0504         K 3.3*      CO2 32*   BUN 22   CREATININE 1.2   CALCIUM 9.2   ANIONGAP 11   EGFRNONAA >60       Significant Imaging: I have reviewed all pertinent imaging results/findings within the past 24 hours.     MRI brain  Impression:     Stable appearance of the brain without acute intracranial process or enhancing lesion to suggest metastatic disease.        Electronically signed by: Surjit Garrett  Date:                                            03/02/2022  Time:                                           15:05    Assessment and Plan:     60 y/o male consulted for AMS    1. Encephalopathy: fluctuation on level awareness and mentation could be from delirium due to acute illness, hospital stay. Pt could also be sleep deprived.   MRI brain was done due to Hx go CA to R/O any CNS involvement. No evident etiology on imaging for AMS.   Exam shows no motor or major sensory deficits.   -If needed quetiapine 25 mg nightly.   -Will monitor.       Active Diagnoses:    Diagnosis Date Noted POA    PRINCIPAL PROBLEM:  Acute respiratory failure with hypoxia and hypercapnia [J96.01, J96.02] 02/22/2022 Yes    Delirium [R41.0] 03/02/2022 No    Cardiogenic shock [R57.0] 02/24/2022 No    Multifocal  atrial tachycardia [I47.1] 02/23/2022 Yes    Acute systolic heart failure [I50.21] 02/23/2022 Yes    NSTEMI (non-ST elevated myocardial infarction) [I21.4] 02/22/2022 Yes    Hypokalemia [E87.6] 02/22/2022 Yes    Hyperphosphatemia [E83.39] 02/22/2022 Yes    High anion gap metabolic acidosis [E87.2] 02/22/2022 Yes    Hyperglycemia [R73.9] 02/22/2022 Yes    Lactic acidosis [E87.2] 02/22/2022 Yes    Palliative care encounter [Z51.5] 02/22/2022 Not Applicable    Primary hypertension [I10]  Yes    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 I will follow-up with patient. Please contact us if you have any additional questions.    José Mendoza MD  Neurology  [facility]

## 2022-03-03 NOTE — SUBJECTIVE & OBJECTIVE
Interval History:  Doing fine.  No chest pains.  However appears to be slightly confused.  Wants his wife to make the decision about cardiac catheterization.  Talked to wife about cardiac catheterization.  Will keep an eye on his mental status and perform cardiac catheterization either as an inpatient or outpatient based on his mental status    Review of Systems   Constitutional: Negative.   HENT: Negative.     Eyes: Negative.    Cardiovascular: Negative.    Respiratory: Negative.     Endocrine: Negative.    Hematologic/Lymphatic: Negative.    Skin: Negative.    Musculoskeletal: Negative.    Gastrointestinal: Negative.    Genitourinary: Negative.    Neurological: Negative.    Psychiatric/Behavioral: Negative.     Allergic/Immunologic: Negative.    Objective:     Vital Signs (Most Recent):  Temp: 97.9 °F (36.6 °C) (03/03/22 1225)  Pulse: 88 (03/03/22 1225)  Resp: 18 (03/03/22 1225)  BP: 133/76 (03/03/22 1225)  SpO2: 99 % (03/03/22 1225) Vital Signs (24h Range):  Temp:  [96.1 °F (35.6 °C)-97.9 °F (36.6 °C)] 97.9 °F (36.6 °C)  Pulse:  [] 88  Resp:  [18-19] 18  SpO2:  [97 %-100 %] 99 %  BP: (116-136)/(63-80) 133/76     Weight: 86.9 kg (191 lb 9.3 oz)  Body mass index is 27.49 kg/m².     SpO2: 99 %  O2 Device (Oxygen Therapy): room air      Intake/Output Summary (Last 24 hours) at 3/3/2022 1311  Last data filed at 3/3/2022 0600  Gross per 24 hour   Intake 600 ml   Output --   Net 600 ml         Lines/Drains/Airways       Central Venous Catheter Line  Duration             Port A Cath Single Lumen right subclavian -- days                    Physical Exam  Vitals reviewed.   Constitutional:       Appearance: He is well-developed.   HENT:      Head: Normocephalic.   Eyes:      Conjunctiva/sclera: Conjunctivae normal.      Pupils: Pupils are equal, round, and reactive to light.   Cardiovascular:      Rate and Rhythm: Normal rate and regular rhythm.      Heart sounds: Normal heart sounds.   Pulmonary:      Effort:  Pulmonary effort is normal.      Breath sounds: Normal breath sounds.   Abdominal:      General: Bowel sounds are normal.      Palpations: Abdomen is soft.   Musculoskeletal:      Cervical back: Normal range of motion and neck supple.   Skin:     General: Skin is warm.   Neurological:      Mental Status: He is alert.       Significant Labs: BMP:   Recent Labs   Lab 03/02/22  0504         K 3.3*      CO2 32*   BUN 22   CREATININE 1.2   CALCIUM 9.2     , CMP   Recent Labs   Lab 03/02/22  0504      K 3.3*      CO2 32*      BUN 22   CREATININE 1.2   CALCIUM 9.2   ANIONGAP 11   ESTGFRAFRICA >60   EGFRNONAA >60     , CBC No results for input(s): WBC, HGB, HCT, PLT in the last 48 hours., INR No results for input(s): INR, PROTIME in the last 48 hours., Lipid Panel No results for input(s): CHOL, HDL, LDLCALC, TRIG, CHOLHDL in the last 48 hours., Troponin No results for input(s): TROPONINI in the last 48 hours., and All pertinent lab results from the last 24 hours have been reviewed.    Significant Imaging: Echocardiogram: Transthoracic echo (TTE) complete (Cupid Only):   Results for orders placed or performed during the hospital encounter of 02/22/22   Echo   Result Value Ref Range    BSA 2.29 m2    TDI SEPTAL 0.04 m/s    LV LATERAL E/E' RATIO 7.29 m/s    LV SEPTAL E/E' RATIO 12.75 m/s    LA WIDTH 4.99 cm    AORTIC VALVE CUSP SEPERATION 2.31 cm    TDI LATERAL 0.07 m/s    PV PEAK VELOCITY 0.75 cm/s    LVIDd 4.49 3.5 - 6.0 cm    IVS 1.33 (A) 0.6 - 1.1 cm    Posterior Wall 1.34 (A) 0.6 - 1.1 cm    Ao root annulus 3.92 cm    LVIDs 3.80 2.1 - 4.0 cm    FS 15 28 - 44 %    LA volume 111.72 cm3    Sinus 3.83 cm    STJ 2.92 cm    Ascending aorta 3.08 cm    LV mass 230.68 g    LA size 4.42 cm    RVDD 4.46 cm    TAPSE 1.56 cm    RV S' 9.12 cm/s    Left Ventricle Relative Wall Thickness 0.60 cm    AV mean gradient 3 mmHg    AV valve area 2.95 cm2    AV Velocity Ratio 0.63     AV index (prosthetic)  0.66     MV valve area p 1/2 method 3.41 cm2    E/A ratio 1.21     Mean e' 0.06 m/s    E wave deceleration time 222.66 msec    IVRT 283.74 msec    Pulm vein S/D ratio 1.04     LVOT diameter 2.39 cm    LVOT area 4.5 cm2    LVOT peak luigi 0.70 m/s    LVOT peak VTI 13.85 cm    Ao peak luigi 1.11 m/s    Ao VTI 21.02 cm    LVOT stroke volume 62.10 cm3    AV peak gradient 5 mmHg    E/E' ratio 9.27 m/s    MV Peak E Luigi 0.51 m/s    TR Max Luigi 2.33 m/s    MV stenosis pressure 1/2 time 64.57 ms    MV Peak A Luigi 0.42 m/s    PV Peak S Luigi 0.29 m/s    PV Peak D Luigi 0.28 m/s    LV Systolic Volume 61.96 mL    LV Systolic Volume Index 28.6 mL/m2    LV Diastolic Volume 92.20 mL    LV Diastolic Volume Index 42.49 mL/m2    LA Volume Index 51.5 mL/m2    LV Mass Index 106 g/m2    RA Major Axis 5.73 cm    Left Atrium Minor Axis 6.03 cm    Left Atrium Major Axis 5.89 cm    Triscuspid Valve Regurgitation Peak Gradient 22 mmHg    RA Width 4.32 cm    EF 35 %    Narrative    · The left ventricle is normal in size with concentric hypertrophy and   moderately decreased systolic function.  · The estimated ejection fraction is 35-40%.  · Grade I left ventricular diastolic dysfunction.  · Mild right ventricular enlargement with normal right ventricular   systolic function.  · Mild left atrial enlargement.  · Mild right atrial enlargement.  · Mild tricuspid regurgitation.

## 2022-03-03 NOTE — ASSESSMENT & PLAN NOTE
- more confused today, possible delirium?  - MRI did not show any mets or stroke or causes  - meds reviewed  - Neurology consulted  - this has resolved

## 2022-03-03 NOTE — PLAN OF CARE
Problem: Infection  Goal: Absence of Infection Signs and Symptoms  Outcome: Ongoing, Progressing  Intervention: Prevent or Manage Infection  Flowsheets (Taken 3/3/2022 0630)  Infection Management: aseptic technique maintained     Problem: Fall Injury Risk  Goal: Absence of Fall and Fall-Related Injury  Outcome: Ongoing, Progressing  Intervention: Identify and Manage Contributors  Flowsheets (Taken 3/3/2022 0630)  Self-Care Promotion:   BADL personal routines maintained   BADL personal objects within reach     Problem: Coping Ineffective  Goal: Effective Coping  Outcome: Ongoing, Progressing     Problem: Adult Inpatient Plan of Care  Goal: Plan of Care Review  Outcome: Ongoing, Progressing  Flowsheets (Taken 3/3/2022 0630)  Plan of Care Reviewed With: patient  Goal: Patient-Specific Goal (Individualized)  Outcome: Ongoing, Progressing  Goal: Absence of Hospital-Acquired Illness or Injury  Outcome: Ongoing, Progressing  Intervention: Identify and Manage Fall Risk  Flowsheets (Taken 3/3/2022 0630)  Safety Promotion/Fall Prevention: side rails raised x 2  Intervention: Prevent Skin Injury  Flowsheets (Taken 3/3/2022 0630)  Skin Protection: tubing/devices free from skin contact  Intervention: Prevent and Manage VTE (Venous Thromboembolism) Risk  Flowsheets (Taken 3/3/2022 0630)  Activity Management: Ambulated -L4  Goal: Optimal Comfort and Wellbeing  Outcome: Ongoing, Progressing  Goal: Readiness for Transition of Care  Outcome: Ongoing, Progressing     Problem: Skin Injury Risk Increased  Goal: Skin Health and Integrity  Outcome: Ongoing, Progressing     Problem: Impaired Wound Healing  Goal: Optimal Wound Healing  Outcome: Ongoing, Progressing

## 2022-03-03 NOTE — PLAN OF CARE
03/03/22 1435   Medicare Message   Important Message from Medicare regarding Discharge Appeal Rights Given to patient/caregiver;Explained to patient/caregiver;Signed/date by patient/caregiver   Date IMM was signed 03/03/22   Time IMM was signed 1433     Patient's wife signed IMM and a copy provided to her.

## 2022-03-03 NOTE — ASSESSMENT & PLAN NOTE
Aspirin and clopidogrel given. Statin. Completed 48 hours of full dose anticoagulation. Echocardiogram significant for LVEF 35-40%, which is new. Plan for C. Patient and wife would like to discuss this with his oncologist before any agreement.   BB and ACE-I when able to tolerate - start on metoprolol  - planning for 3/4/22

## 2022-03-03 NOTE — PROGRESS NOTES
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 Patient is currently intubated and unable to provide history. His wife, who is at bedside, states he was in his normal state up until today. States he had been eating and drinking just fine but did have to hide his "coke" which he drinks in excess. Apparently had "coke" again this AM as he felt that would help his breathing. He is currently on palliative chemotherapy with Gemcitabine, last dose given 2/15/2022 (about a week ago). Is no longer smoking.     In the ED, patient had progressive respiratory distress. Became diaphoretic and confused. Was also pulling oxygen mask. Patient was therefore intubated. Labwork significant for lactic acidosis of 8.7, hyperglycemia, HAGMA, hypokalemia, mild hyponatremia, hyperphosphatemia, BNP 700s, hyaline casts in UA and troponin 0.9. No ST segment elevation or depression. Cardiology and pulmonology consulted. Given fluids, empiric abx and admitted to ICU.       Overview/Hospital Course:  Mr Iverson presented with acute respiratory failure with hypercapnia/hypoxia. Intubated in the ED. Started on broad spectrum antibiotics. Blood and resp cultures obtained. No pneumothorax, large consolidation or pulmonary embolism but with moderate right sided pleural effusion and known left main bronchial cancerous mass. Given fluids. Lactic acidosis resolved. Questionable diabetic ketoacidosis given hyperglycemia and high anion gap metabolic acidosis. Placed on insulin " infusion. Also started on full dose anticoagulation, double antiplatelet therapy and statin for suspected NSTEMI. Glucose normalized and HAGMA resolved. Insulin discontinued. Fluids stopped. HgbA1c < 6% which is not consistent with diabetes. Cardiology and pulmonology consulted. Echocardiogram showed LVEF 35%-40% with grade 1 diastolic dysfunction. Furosemide IV given. Extubated to NC on 2/23. Overnight patient became hyperactive and encephalopathic. Experienced respiratory failure. Re-intubated. Diuresed. Extubated on 2/25 with precedex in place to reduce anxiety/paranoia post extubation. This worked well and remained stable on low flow NC. Plan for LHC. PT/OT consulted for dispo.       Interval History: confusion improved, discussed with wife at bedside. Plan for LHC tomorrow.    Review of Systems   Constitutional: Negative.    Respiratory: Negative.     Cardiovascular: Negative.    Gastrointestinal: Negative.    Objective:     Vital Signs (Most Recent):  Temp: 97.9 °F (36.6 °C) (03/03/22 1225)  Pulse: 88 (03/03/22 1225)  Resp: 18 (03/03/22 1225)  BP: 133/76 (03/03/22 1225)  SpO2: 99 % (03/03/22 1225)   Vital Signs (24h Range):  Temp:  [96.1 °F (35.6 °C)-97.9 °F (36.6 °C)] 97.9 °F (36.6 °C)  Pulse:  [] 88  Resp:  [18-19] 18  SpO2:  [97 %-100 %] 99 %  BP: (116-136)/(63-80) 133/76     Weight: 86.9 kg (191 lb 9.3 oz)  Body mass index is 27.49 kg/m².    Intake/Output Summary (Last 24 hours) at 3/3/2022 1426  Last data filed at 3/3/2022 0600  Gross per 24 hour   Intake 600 ml   Output --   Net 600 ml        Physical Exam  Vitals and nursing note reviewed.   Constitutional:       General: He is not in acute distress.     Appearance: He is ill-appearing. He is not toxic-appearing.   Cardiovascular:      Rate and Rhythm: Normal rate and regular rhythm.   Pulmonary:      Effort: Pulmonary effort is normal.      Breath sounds: No wheezing or rales.   Abdominal:      Palpations: Abdomen is soft.   Musculoskeletal:       Right lower leg: No edema.      Left lower leg: No edema.   Skin:     General: Skin is warm.      Capillary Refill: Capillary refill takes less than 2 seconds.   Neurological:      Mental Status: He is alert.      Comments: Oriented to self and situation but does not know the date   Psychiatric:         Mood and Affect: Mood normal.         Behavior: Behavior normal.       Significant Labs: All pertinent labs within the past 24 hours have been reviewed.    Significant Imaging: I have reviewed all pertinent imaging results/findings within the past 24 hours.      Assessment/Plan:      * Acute respiratory failure with hypoxia and hypercapnia  Likely due to heart failure. Respiratory failure resolved   Continue furosemide in oral form.   Consult PT/OT - recommending home  - now resolved    Delirium  - more confused today, possible delirium?  - MRI did not show any mets or stroke or causes  - meds reviewed  - Neurology consulted  - this has resolved       Cardiogenic shock  See heart failure, now resolved      Acute systolic heart failure  - presented with heart failure and cardiogenic shock on admission  - EF 35% which is new, ischemic vs non-ischemic. Discussing with wife about C  - on asa/plavix and completed anticoagulation for 48 hours  - now euvolemic, on PO lasix      Multifocal atrial tachycardia  - noted on admit, now resolved      Palliative care encounter  Advance Care Planning     Date: 02/22/2022    [facility]  I engaged the wife in a conversation about advance care planning and we specifically addressed what the goals of care would be moving forward, in light of the patient's change in clinical status.  We did specifically address the patient's likely prognosis, which is fair .  We explored the patient's values and preferences for future care.  The wife endorses that what is most important right now is to focus on curative/life-prolongation (regardless of treatment burdens)    Accordingly, we have decided that  the best plan to meet the patient's goals includes continuing with treatment    I did not explain the role for hospice care at this stage of the patient's illness, including its ability to help the patient live with the best quality of life possible.  We will not be making a hospice referral.    I spent a total of > 15 minutes engaging the patient in this advance care planning discussion.    Dr Tran states she had a code status conversation with patient prior to intubation. Patient prefers aggressive care and full code. Wife confirms that these are his wishes.              Lactic acidosis  Resolved with fluids      Hyperglycemia   HgbA1c not consistent with diabetes. Stress induced?        High anion gap metabolic acidosis  Likely due to lactic acidosis. Resolved         Hyperphosphatemia  PTH high. Hyperphosphatemia resolved.       Hypokalemia  Replaced. Daily Potassium with furosemide      NSTEMI (non-ST elevated myocardial infarction)  Aspirin and clopidogrel given. Statin. Completed 48 hours of full dose anticoagulation. Echocardiogram significant for LVEF 35-40%, which is new. Plan for C. Patient and wife would like to discuss this with his oncologist before any agreement.   BB and ACE-I when able to tolerate - start on metoprolol  - planning for 3/4/22      Primary hypertension  - slowly creeping up  - will start to add back medications, particularly GDMT      Immunodeficiency due to drugs  Noted. Is not leukopenic or neutropenic      Secondary malignant neoplasm of bone  Noted       Lung cancer, main bronchus, left  Noted. Is stage 4. On palliative chemotherapy. Followed by Dr Hathaway as outpatient      History of completed stroke  Noted   - repeat MRI notes stable findings    Macrocytic anemia  No indication for transfusion at this time    Former smoker  Per patient's wife, patient has been abstinent for 3 years        VTE Risk Mitigation (From admission, onward)         Ordered     enoxaparin injection 40  mg  Daily         02/24/22 1446                Discharge Planning   AZ:      Code Status: Full Code   Is the patient medically ready for discharge?:     Reason for patient still in hospital (select all that apply): Treatment  Discharge Plan A: Home with family (Patient has OP therapy sessions scheduled)                  Rolando Knott MD  Department of [facility] Medicine   [facility]

## 2022-03-03 NOTE — PROGRESS NOTES
[facility] - Telemetry  Cardiology  Progress Note    Patient Name: Kashif Iverson  MRN: [id number]  Admission Date: 2/22/2022  Hospital Length of Stay: 9 days  Code Status: Full Code   Attending Physician: Rolando Knott MD   Primary Care Physician: Eduardo Ruiz MD  Expected Discharge Date:   Principal Problem:Acute respiratory failure with hypoxia and hypercapnia    Subjective:         Interval History:  Doing fine.  No chest pains.  However appears to be slightly confused.  Wants his wife to make the decision about cardiac catheterization.  Talked to wife about cardiac catheterization.  Will keep an eye on his mental status and perform cardiac catheterization either as an inpatient or outpatient based on his mental status    Review of Systems   Constitutional: Negative.   HENT: Negative.     Eyes: Negative.    Cardiovascular: Negative.    Respiratory: Negative.     Endocrine: Negative.    Hematologic/Lymphatic: Negative.    Skin: Negative.    Musculoskeletal: Negative.    Gastrointestinal: Negative.    Genitourinary: Negative.    Neurological: Negative.    Psychiatric/Behavioral: Negative.     Allergic/Immunologic: Negative.    Objective:     Vital Signs (Most Recent):  Temp: 97.9 °F (36.6 °C) (03/03/22 1225)  Pulse: 88 (03/03/22 1225)  Resp: 18 (03/03/22 1225)  BP: 133/76 (03/03/22 1225)  SpO2: 99 % (03/03/22 1225) Vital Signs (24h Range):  Temp:  [96.1 °F (35.6 °C)-97.9 °F (36.6 °C)] 97.9 °F (36.6 °C)  Pulse:  [] 88  Resp:  [18-19] 18  SpO2:  [97 %-100 %] 99 %  BP: (116-136)/(63-80) 133/76     Weight: 86.9 kg (191 lb 9.3 oz)  Body mass index is 27.49 kg/m².     SpO2: 99 %  O2 Device (Oxygen Therapy): room air      Intake/Output Summary (Last 24 hours) at 3/3/2022 1311  Last data filed at 3/3/2022 0600  Gross per 24 hour   Intake 600 ml   Output --   Net 600 ml         Lines/Drains/Airways       Central Venous Catheter Line  Duration             Port A Cath Single Lumen right subclavian -- days                     Physical Exam  Vitals reviewed.   Constitutional:       Appearance: He is well-developed.   HENT:      Head: Normocephalic.   Eyes:      Conjunctiva/sclera: Conjunctivae normal.      Pupils: Pupils are equal, round, and reactive to light.   Cardiovascular:      Rate and Rhythm: Normal rate and regular rhythm.      Heart sounds: Normal heart sounds.   Pulmonary:      Effort: Pulmonary effort is normal.      Breath sounds: Normal breath sounds.   Abdominal:      General: Bowel sounds are normal.      Palpations: Abdomen is soft.   Musculoskeletal:      Cervical back: Normal range of motion and neck supple.   Skin:     General: Skin is warm.   Neurological:      Mental Status: He is alert.       Significant Labs: BMP:   Recent Labs   Lab 03/02/22  0504         K 3.3*      CO2 32*   BUN 22   CREATININE 1.2   CALCIUM 9.2     , CMP   Recent Labs   Lab 03/02/22  0504      K 3.3*      CO2 32*      BUN 22   CREATININE 1.2   CALCIUM 9.2   ANIONGAP 11   ESTGFRAFRICA >60   EGFRNONAA >60     , CBC No results for input(s): WBC, HGB, HCT, PLT in the last 48 hours., INR No results for input(s): INR, PROTIME in the last 48 hours., Lipid Panel No results for input(s): CHOL, HDL, LDLCALC, TRIG, CHOLHDL in the last 48 hours., Troponin No results for input(s): TROPONINI in the last 48 hours., and All pertinent lab results from the last 24 hours have been reviewed.    Significant Imaging: Echocardiogram: Transthoracic echo (TTE) complete (Cupid Only):   Results for orders placed or performed during the hospital encounter of 02/22/22   Echo   Result Value Ref Range    BSA 2.29 m2    TDI SEPTAL 0.04 m/s    LV LATERAL E/E' RATIO 7.29 m/s    LV SEPTAL E/E' RATIO 12.75 m/s    LA WIDTH 4.99 cm    AORTIC VALVE CUSP SEPERATION 2.31 cm    TDI LATERAL 0.07 m/s    PV PEAK VELOCITY 0.75 cm/s    LVIDd 4.49 3.5 - 6.0 cm    IVS 1.33 (A) 0.6 - 1.1 cm    Posterior Wall 1.34 (A) 0.6 - 1.1 cm    Ao root annulus  3.92 cm    LVIDs 3.80 2.1 - 4.0 cm    FS 15 28 - 44 %    LA volume 111.72 cm3    Sinus 3.83 cm    STJ 2.92 cm    Ascending aorta 3.08 cm    LV mass 230.68 g    LA size 4.42 cm    RVDD 4.46 cm    TAPSE 1.56 cm    RV S' 9.12 cm/s    Left Ventricle Relative Wall Thickness 0.60 cm    AV mean gradient 3 mmHg    AV valve area 2.95 cm2    AV Velocity Ratio 0.63     AV index (prosthetic) 0.66     MV valve area p 1/2 method 3.41 cm2    E/A ratio 1.21     Mean e' 0.06 m/s    E wave deceleration time 222.66 msec    IVRT 283.74 msec    Pulm vein S/D ratio 1.04     LVOT diameter 2.39 cm    LVOT area 4.5 cm2    LVOT peak luigi 0.70 m/s    LVOT peak VTI 13.85 cm    Ao peak luigi 1.11 m/s    Ao VTI 21.02 cm    LVOT stroke volume 62.10 cm3    AV peak gradient 5 mmHg    E/E' ratio 9.27 m/s    MV Peak E Luigi 0.51 m/s    TR Max Luigi 2.33 m/s    MV stenosis pressure 1/2 time 64.57 ms    MV Peak A Luigi 0.42 m/s    PV Peak S Luigi 0.29 m/s    PV Peak D Luigi 0.28 m/s    LV Systolic Volume 61.96 mL    LV Systolic Volume Index 28.6 mL/m2    LV Diastolic Volume 92.20 mL    LV Diastolic Volume Index 42.49 mL/m2    LA Volume Index 51.5 mL/m2    LV Mass Index 106 g/m2    RA Major Axis 5.73 cm    Left Atrium Minor Axis 6.03 cm    Left Atrium Major Axis 5.89 cm    Triscuspid Valve Regurgitation Peak Gradient 22 mmHg    RA Width 4.32 cm    EF 35 %    Narrative    · The left ventricle is normal in size with concentric hypertrophy and   moderately decreased systolic function.  · The estimated ejection fraction is 35-40%.  · Grade I left ventricular diastolic dysfunction.  · Mild right ventricular enlargement with normal right ventricular   systolic function.  · Mild left atrial enlargement.  · Mild right atrial enlargement.  · Mild tricuspid regurgitation.        Assessment and Plan:     Brief HPI:     * Acute respiratory failure with hypoxia and hypercapnia  Extubated today.    Cardiogenic shock  Patient oncologist has cleared him for cardiac catheterization.   Wife interested in getting cardiac catheterization.  However patient appears to be slightly confused.  Wants his wife to make the decision about cardiac catheterization.  Talked to wife about cardiac catheterization.  Will keep an eye on his mental status and perform cardiac catheterization either as an inpatient or outpatient based on his mental status    Multifocal atrial tachycardia  New Dx. Related to underlying pulmonary issues. Now resolved    High anion gap metabolic acidosis  Being treated for possible DKA    NSTEMI (non-ST elevated myocardial infarction)  Peak troponin 1.7. Suspect demand ischemia from tachycardia, respiratory distress and acidosis. Echo with EF 35-40% ? Tachymyopathy. Will discuss ischemic evaluation when more stable.            Lung cancer, main bronchus, left  Per primary    Former smoker            VTE Risk Mitigation (From admission, onward)         Ordered     enoxaparin injection 40 mg  Daily         02/24/22 5078                Catia Roman MD  Cardiology  [facility] - Telemetry

## 2022-03-03 NOTE — PLAN OF CARE
03/03/22 1436   Discharge Reassessment   Assessment Type Discharge Planning Reassessment   Did the patient's condition or plan change since previous assessment? Yes   Discharge Plan discussed with: Spouse/sig other;Patient   Name(s) and Number(s) Natty Iverson (Spouse)   [phone]   Communicated [place] with patient/caregiver Yes   Discharge Plan A Home with family   Discharge Plan B Home with family   Discharge Barriers Identified None   Why the patient remains in the hospital Requires continued medical care   Post-Acute Status   Hospital Resources/Appts/Education Provided Provided patient/caregiver with written discharge plan information;Appointments scheduled and added to AVS   Discharge Delays None known at this time

## 2022-03-03 NOTE — CARE UPDATE
Confusion improving. Discussed LHC - risks/benefits explained and he agrees to proceed. Consent on chart

## 2022-03-04 LAB
ANION GAP SERPL CALC-SCNC: 10 MMOL/L (ref 8–16)
ANION GAP SERPL CALC-SCNC: 9 MMOL/L (ref 8–16)
APTT BLDCRRT: 30.2 SEC (ref 21–32)
BASOPHILS # BLD AUTO: 0.08 K/UL (ref 0–0.2)
BASOPHILS NFR BLD: 0.8 % (ref 0–1.9)
BUN SERPL-MCNC: 22 MG/DL (ref 8–23)
BUN SERPL-MCNC: 22 MG/DL (ref 8–23)
CALCIUM SERPL-MCNC: 8.5 MG/DL (ref 8.7–10.5)
CALCIUM SERPL-MCNC: 8.7 MG/DL (ref 8.7–10.5)
CHLORIDE SERPL-SCNC: 103 MMOL/L (ref 95–110)
CHLORIDE SERPL-SCNC: 103 MMOL/L (ref 95–110)
CO2 SERPL-SCNC: 27 MMOL/L (ref 23–29)
CO2 SERPL-SCNC: 27 MMOL/L (ref 23–29)
CREAT SERPL-MCNC: 1 MG/DL (ref 0.5–1.4)
CREAT SERPL-MCNC: 1.1 MG/DL (ref 0.5–1.4)
DIFFERENTIAL METHOD: ABNORMAL
EOSINOPHIL # BLD AUTO: 0.2 K/UL (ref 0–0.5)
EOSINOPHIL NFR BLD: 2.1 % (ref 0–8)
ERYTHROCYTE [DISTWIDTH] IN BLOOD BY AUTOMATED COUNT: 16 % (ref 11.5–14.5)
ERYTHROCYTE [DISTWIDTH] IN BLOOD BY AUTOMATED COUNT: 16.1 % (ref 11.5–14.5)
EST. GFR  (AFRICAN AMERICAN): >60 ML/MIN/1.73 M^2
EST. GFR  (AFRICAN AMERICAN): >60 ML/MIN/1.73 M^2
EST. GFR  (NON AFRICAN AMERICAN): >60 ML/MIN/1.73 M^2
EST. GFR  (NON AFRICAN AMERICAN): >60 ML/MIN/1.73 M^2
GLUCOSE SERPL-MCNC: 103 MG/DL (ref 70–110)
GLUCOSE SERPL-MCNC: 88 MG/DL (ref 70–110)
HCT VFR BLD AUTO: 37.1 % (ref 40–54)
HCT VFR BLD AUTO: 42.2 % (ref 40–54)
HGB BLD-MCNC: 11.8 G/DL (ref 14–18)
HGB BLD-MCNC: 13.1 G/DL (ref 14–18)
IMM GRANULOCYTES # BLD AUTO: 0.06 K/UL (ref 0–0.04)
IMM GRANULOCYTES NFR BLD AUTO: 0.6 % (ref 0–0.5)
INR PPP: 1.1 (ref 0.8–1.2)
LYMPHOCYTES # BLD AUTO: 2.2 K/UL (ref 1–4.8)
LYMPHOCYTES NFR BLD: 22.6 % (ref 18–48)
MCH RBC QN AUTO: 31.6 PG (ref 27–31)
MCH RBC QN AUTO: 31.7 PG (ref 27–31)
MCHC RBC AUTO-ENTMCNC: 31 G/DL (ref 32–36)
MCHC RBC AUTO-ENTMCNC: 31.8 G/DL (ref 32–36)
MCV RBC AUTO: 100 FL (ref 82–98)
MCV RBC AUTO: 102 FL (ref 82–98)
MONOCYTES # BLD AUTO: 1.5 K/UL (ref 0.3–1)
MONOCYTES NFR BLD: 14.7 % (ref 4–15)
NEUTROPHILS # BLD AUTO: 5.9 K/UL (ref 1.8–7.7)
NEUTROPHILS NFR BLD: 59.2 % (ref 38–73)
NRBC BLD-RTO: 0 /100 WBC
PHOSPHATE SERPL-MCNC: 2.6 MG/DL (ref 2.7–4.5)
PLATELET # BLD AUTO: 329 K/UL (ref 150–450)
PLATELET # BLD AUTO: 331 K/UL (ref 150–450)
PMV BLD AUTO: 10 FL (ref 9.2–12.9)
PMV BLD AUTO: 10.3 FL (ref 9.2–12.9)
POTASSIUM SERPL-SCNC: 3.5 MMOL/L (ref 3.5–5.1)
POTASSIUM SERPL-SCNC: 3.7 MMOL/L (ref 3.5–5.1)
PROTHROMBIN TIME: 11.7 SEC (ref 9–12.5)
RBC # BLD AUTO: 3.72 M/UL (ref 4.6–6.2)
RBC # BLD AUTO: 4.15 M/UL (ref 4.6–6.2)
SODIUM SERPL-SCNC: 139 MMOL/L (ref 136–145)
SODIUM SERPL-SCNC: 140 MMOL/L (ref 136–145)
WBC # BLD AUTO: 9.65 K/UL (ref 3.9–12.7)
WBC # BLD AUTO: 9.91 K/UL (ref 3.9–12.7)

## 2022-03-04 PROCEDURE — 21400001 HC TELEMETRY ROOM

## 2022-03-04 PROCEDURE — 99153 MOD SED SAME PHYS/QHP EA: CPT | Performed by: INTERNAL MEDICINE

## 2022-03-04 PROCEDURE — 85025 COMPLETE CBC W/AUTO DIFF WBC: CPT | Performed by: STUDENT IN AN ORGANIZED HEALTH CARE EDUCATION/TRAINING PROGRAM

## 2022-03-04 PROCEDURE — 25500020 PHARM REV CODE 255: Performed by: INTERNAL MEDICINE

## 2022-03-04 PROCEDURE — C1894 INTRO/SHEATH, NON-LASER: HCPCS | Performed by: INTERNAL MEDICINE

## 2022-03-04 PROCEDURE — 99152 PR MOD CONSCIOUS SEDATION, SAME PHYS, 5+ YRS, FIRST 15 MIN: ICD-10-PCS | Mod: ,,, | Performed by: INTERNAL MEDICINE

## 2022-03-04 PROCEDURE — 25000003 PHARM REV CODE 250: Performed by: STUDENT IN AN ORGANIZED HEALTH CARE EDUCATION/TRAINING PROGRAM

## 2022-03-04 PROCEDURE — 99152 MOD SED SAME PHYS/QHP 5/>YRS: CPT | Performed by: INTERNAL MEDICINE

## 2022-03-04 PROCEDURE — 93458 L HRT ARTERY/VENTRICLE ANGIO: CPT | Performed by: INTERNAL MEDICINE

## 2022-03-04 PROCEDURE — 94760 N-INVAS EAR/PLS OXIMETRY 1: CPT

## 2022-03-04 PROCEDURE — 25000003 PHARM REV CODE 250: Performed by: INTERNAL MEDICINE

## 2022-03-04 PROCEDURE — 85610 PROTHROMBIN TIME: CPT | Performed by: INTERNAL MEDICINE

## 2022-03-04 PROCEDURE — 85027 COMPLETE CBC AUTOMATED: CPT | Performed by: INTERNAL MEDICINE

## 2022-03-04 PROCEDURE — C1887 CATHETER, GUIDING: HCPCS | Performed by: INTERNAL MEDICINE

## 2022-03-04 PROCEDURE — 80048 BASIC METABOLIC PNL TOTAL CA: CPT | Performed by: STUDENT IN AN ORGANIZED HEALTH CARE EDUCATION/TRAINING PROGRAM

## 2022-03-04 PROCEDURE — 99152 MOD SED SAME PHYS/QHP 5/>YRS: CPT | Mod: ,,, | Performed by: INTERNAL MEDICINE

## 2022-03-04 PROCEDURE — A4216 STERILE WATER/SALINE, 10 ML: HCPCS | Performed by: INTERNAL MEDICINE

## 2022-03-04 PROCEDURE — 99232 PR SUBSEQUENT HOSPITAL CARE,LEVL II: ICD-10-PCS | Mod: ,,, | Performed by: PSYCHIATRY & NEUROLOGY

## 2022-03-04 PROCEDURE — 27000221 HC OXYGEN, UP TO 24 HOURS

## 2022-03-04 PROCEDURE — 84100 ASSAY OF PHOSPHORUS: CPT | Performed by: INTERNAL MEDICINE

## 2022-03-04 PROCEDURE — 93458 PR CATH PLACE/CORON ANGIO, IMG SUPER/INTERP,W LEFT HEART VENTRICULOGRAPHY: ICD-10-PCS | Mod: 26,,, | Performed by: INTERNAL MEDICINE

## 2022-03-04 PROCEDURE — 99232 SBSQ HOSP IP/OBS MODERATE 35: CPT | Mod: ,,, | Performed by: PSYCHIATRY & NEUROLOGY

## 2022-03-04 PROCEDURE — 63600175 PHARM REV CODE 636 W HCPCS: Performed by: INTERNAL MEDICINE

## 2022-03-04 PROCEDURE — 85730 THROMBOPLASTIN TIME PARTIAL: CPT | Performed by: INTERNAL MEDICINE

## 2022-03-04 PROCEDURE — 93458 L HRT ARTERY/VENTRICLE ANGIO: CPT | Mod: 26,,, | Performed by: INTERNAL MEDICINE

## 2022-03-04 PROCEDURE — 36415 COLL VENOUS BLD VENIPUNCTURE: CPT | Performed by: INTERNAL MEDICINE

## 2022-03-04 PROCEDURE — C1769 GUIDE WIRE: HCPCS | Performed by: INTERNAL MEDICINE

## 2022-03-04 PROCEDURE — 80048 BASIC METABOLIC PNL TOTAL CA: CPT | Mod: 91 | Performed by: INTERNAL MEDICINE

## 2022-03-04 RX ORDER — MIDAZOLAM HYDROCHLORIDE 1 MG/ML
INJECTION INTRAMUSCULAR; INTRAVENOUS
Status: DISCONTINUED | OUTPATIENT
Start: 2022-03-04 | End: 2022-03-04 | Stop reason: HOSPADM

## 2022-03-04 RX ORDER — NITROGLYCERIN 0.4 MG/1
0.4 TABLET SUBLINGUAL EVERY 5 MIN PRN
Status: DISCONTINUED | OUTPATIENT
Start: 2022-03-04 | End: 2022-03-05 | Stop reason: HOSPADM

## 2022-03-04 RX ORDER — LIDOCAINE HYDROCHLORIDE 10 MG/ML
INJECTION, SOLUTION EPIDURAL; INFILTRATION; INTRACAUDAL; PERINEURAL
Status: DISCONTINUED | OUTPATIENT
Start: 2022-03-04 | End: 2022-03-04 | Stop reason: HOSPADM

## 2022-03-04 RX ORDER — ONDANSETRON 8 MG/1
8 TABLET, ORALLY DISINTEGRATING ORAL EVERY 8 HOURS PRN
Status: DISCONTINUED | OUTPATIENT
Start: 2022-03-04 | End: 2022-03-05 | Stop reason: HOSPADM

## 2022-03-04 RX ORDER — SODIUM CHLORIDE 9 MG/ML
INJECTION, SOLUTION INTRAVENOUS CONTINUOUS
Status: DISCONTINUED | OUTPATIENT
Start: 2022-03-04 | End: 2022-03-05

## 2022-03-04 RX ORDER — SODIUM CHLORIDE 9 MG/ML
INJECTION, SOLUTION INTRAVENOUS CONTINUOUS
Status: ACTIVE | OUTPATIENT
Start: 2022-03-04 | End: 2022-03-04

## 2022-03-04 RX ORDER — FENTANYL CITRATE 50 UG/ML
INJECTION, SOLUTION INTRAMUSCULAR; INTRAVENOUS
Status: DISCONTINUED | OUTPATIENT
Start: 2022-03-04 | End: 2022-03-04 | Stop reason: HOSPADM

## 2022-03-04 RX ORDER — HYDROCODONE BITARTRATE AND ACETAMINOPHEN 5; 325 MG/1; MG/1
1 TABLET ORAL EVERY 4 HOURS PRN
Status: DISCONTINUED | OUTPATIENT
Start: 2022-03-04 | End: 2022-03-05 | Stop reason: HOSPADM

## 2022-03-04 RX ORDER — ACETAMINOPHEN 325 MG/1
650 TABLET ORAL EVERY 4 HOURS PRN
Status: DISCONTINUED | OUTPATIENT
Start: 2022-03-04 | End: 2022-03-05 | Stop reason: HOSPADM

## 2022-03-04 RX ORDER — HEPARIN SODIUM 200 [USP'U]/100ML
INJECTION, SOLUTION INTRAVENOUS
Status: DISCONTINUED | OUTPATIENT
Start: 2022-03-04 | End: 2022-03-05

## 2022-03-04 RX ADMIN — SODIUM CHLORIDE: 0.9 INJECTION, SOLUTION INTRAVENOUS at 09:03

## 2022-03-04 RX ADMIN — POTASSIUM PHOSPHATE, MONOBASIC 500 MG: 500 TABLET, SOLUBLE ORAL at 07:03

## 2022-03-04 RX ADMIN — ATORVASTATIN CALCIUM 80 MG: 40 TABLET, FILM COATED ORAL at 07:03

## 2022-03-04 RX ADMIN — Medication 10 ML: at 06:03

## 2022-03-04 RX ADMIN — METOPROLOL TARTRATE 25 MG: 25 TABLET, FILM COATED ORAL at 07:03

## 2022-03-04 RX ADMIN — Medication 400 MG: at 07:03

## 2022-03-04 RX ADMIN — ASPIRIN 81 MG CHEWABLE TABLET 81 MG: 81 TABLET CHEWABLE at 07:03

## 2022-03-04 RX ADMIN — FUROSEMIDE 40 MG: 40 TABLET ORAL at 07:03

## 2022-03-04 RX ADMIN — Medication 10 ML: at 12:03

## 2022-03-04 RX ADMIN — METOPROLOL TARTRATE 25 MG: 25 TABLET, FILM COATED ORAL at 09:03

## 2022-03-04 RX ADMIN — SODIUM CHLORIDE: 0.9 INJECTION, SOLUTION INTRAVENOUS at 07:03

## 2022-03-04 RX ADMIN — CLOPIDOGREL 75 MG: 75 TABLET, FILM COATED ORAL at 07:03

## 2022-03-04 RX ADMIN — OLANZAPINE 5 MG: 5 TABLET, ORALLY DISINTEGRATING ORAL at 09:03

## 2022-03-04 NOTE — PROCEDURES
Procedures     [facility]   Dr Green  Pre-op Dx NSTEMI, CM  Post-op Dx same  Specimen none  EBL < 50 cc    3/4/22 [facility] - EDP 15, LAD 20% proximal 30% mid at D1, Cx/RCA with luminal irregularities    Non-obstructive CAD  Continue medical Rx  Reviewed with Dr Verma

## 2022-03-04 NOTE — SUBJECTIVE & OBJECTIVE
Interval History: seen post cath, doing well and having no issues.     Review of Systems   Constitutional: Negative.    Respiratory: Negative.     Cardiovascular: Negative.    Gastrointestinal: Negative.    Objective:     Vital Signs (Most Recent):  Temp: 98.2 °F (36.8 °C) (03/04/22 0914)  Pulse: 70 (03/04/22 0914)  Resp: 18 (03/04/22 0914)  BP: 104/65 (03/04/22 0914)  SpO2: 96 % (03/04/22 0914)   Vital Signs (24h Range):  Temp:  [97.9 °F (36.6 °C)-98.5 °F (36.9 °C)] 98.2 °F (36.8 °C)  Pulse:  [62-92] 70  Resp:  [18-22] 18  SpO2:  [96 %-100 %] 96 %  BP: (104-138)/(62-82) 104/65     Weight: 86.9 kg (191 lb 9.3 oz)  Body mass index is 27.49 kg/m².    Intake/Output Summary (Last 24 hours) at 3/4/2022 1041  Last data filed at 3/3/2022 2343  Gross per 24 hour   Intake 600 ml   Output 650 ml   Net -50 ml        Physical Exam  Vitals and nursing note reviewed.   Constitutional:       General: He is not in acute distress.     Appearance: He is not ill-appearing or toxic-appearing.   Cardiovascular:      Rate and Rhythm: Normal rate and regular rhythm.   Pulmonary:      Effort: Pulmonary effort is normal.      Breath sounds: No wheezing or rales.   Abdominal:      Palpations: Abdomen is soft.   Musculoskeletal:      Right lower leg: No edema.      Left lower leg: No edema.   Skin:     General: Skin is warm.      Capillary Refill: Capillary refill takes less than 2 seconds.   Neurological:      Mental Status: He is alert.      Comments: Oriented and seems back to his baseline   Psychiatric:         Mood and Affect: Mood normal.         Behavior: Behavior normal.       Significant Labs: All pertinent labs within the past 24 hours have been reviewed.    Significant Imaging: I have reviewed all pertinent imaging results/findings within the past 24 hours.

## 2022-03-04 NOTE — PROGRESS NOTES
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 Patient is currently intubated and unable to provide history. His wife, who is at bedside, states he was in his normal state up until today. States he had been eating and drinking just fine but did have to hide his "coke" which he drinks in excess. Apparently had "coke" again this AM as he felt that would help his breathing. He is currently on palliative chemotherapy with Gemcitabine, last dose given 2/15/2022 (about a week ago). Is no longer smoking.     In the ED, patient had progressive respiratory distress. Became diaphoretic and confused. Was also pulling oxygen mask. Patient was therefore intubated. Labwork significant for lactic acidosis of 8.7, hyperglycemia, HAGMA, hypokalemia, mild hyponatremia, hyperphosphatemia, BNP 700s, hyaline casts in UA and troponin 0.9. No ST segment elevation or depression. Cardiology and pulmonology consulted. Given fluids, empiric abx and admitted to ICU.       Overview/Hospital Course:  Mr Iverson presented with acute respiratory failure with hypercapnia/hypoxia. Intubated in the ED. Started on broad spectrum antibiotics. Blood and resp cultures obtained. No pneumothorax, large consolidation or pulmonary embolism but with moderate right sided pleural effusion and known left main bronchial cancerous mass. Given fluids. Lactic acidosis resolved. Questionable diabetic ketoacidosis given hyperglycemia and high anion gap metabolic acidosis. Placed on " insulin infusion. Also started on full dose anticoagulation, double antiplatelet therapy and statin for suspected NSTEMI. Glucose normalized and HAGMA resolved. Insulin discontinued. Fluids stopped. HgbA1c < 6% which is not consistent with diabetes. Cardiology and pulmonology consulted. Echocardiogram showed LVEF 35%-40% with grade 1 diastolic dysfunction. Furosemide IV given. Extubated to NC on 2/23. Overnight patient became hyperactive and encephalopathic. Experienced respiratory failure. Re-intubated. Diuresed. Extubated on 2/25 with precedex in place to reduce anxiety/paranoia post extubation. This worked well and remained stable on low flow NC. Plan for [facility]. PT/OT consulted for dispo.       Interval History: seen post cath, doing well and having no issues.     Review of Systems   Constitutional: Negative.    Respiratory: Negative.     Cardiovascular: Negative.    Gastrointestinal: Negative.    Objective:     Vital Signs (Most Recent):  Temp: 98.2 °F (36.8 °C) (03/04/22 0914)  Pulse: 70 (03/04/22 0914)  Resp: 18 (03/04/22 0914)  BP: 104/65 (03/04/22 0914)  SpO2: 96 % (03/04/22 0914)   Vital Signs (24h Range):  Temp:  [97.9 °F (36.6 °C)-98.5 °F (36.9 °C)] 98.2 °F (36.8 °C)  Pulse:  [62-92] 70  Resp:  [18-22] 18  SpO2:  [96 %-100 %] 96 %  BP: (104-138)/(62-82) 104/65     Weight: 86.9 kg (191 lb 9.3 oz)  Body mass index is 27.49 kg/m².    Intake/Output Summary (Last 24 hours) at 3/4/2022 1041  Last data filed at 3/3/2022 2343  Gross per 24 hour   Intake 600 ml   Output 650 ml   Net -50 ml        Physical Exam  Vitals and nursing note reviewed.   Constitutional:       General: He is not in acute distress.     Appearance: He is not ill-appearing or toxic-appearing.   Cardiovascular:      Rate and Rhythm: Normal rate and regular rhythm.   Pulmonary:      Effort: Pulmonary effort is normal.      Breath sounds: No wheezing or rales.   Abdominal:      Palpations: Abdomen is soft.   Musculoskeletal:      Right lower leg:  No edema.      Left lower leg: No edema.   Skin:     General: Skin is warm.      Capillary Refill: Capillary refill takes less than 2 seconds.   Neurological:      Mental Status: He is alert.      Comments: Oriented and seems back to his baseline   Psychiatric:         Mood and Affect: Mood normal.         Behavior: Behavior normal.       Significant Labs: All pertinent labs within the past 24 hours have been reviewed.    Significant Imaging: I have reviewed all pertinent imaging results/findings within the past 24 hours.      Assessment/Plan:      * Acute respiratory failure with hypoxia and hypercapnia  Likely due to heart failure. Respiratory failure resolved   Continue furosemide in oral form.   Consult PT/OT - recommending home  - now resolved    Delirium  - more confused today, possible delirium?  - MRI did not show any mets or stroke or causes  - meds reviewed  - Neurology consulted  - this has resolved       Cardiogenic shock  See heart failure, now resolved      Acute systolic heart failure  - presented with heart failure and cardiogenic shock on admission  - EF 35% which is new, ischemic vs non-ischemic. Discussing with wife about LHC  - on asa/plavix and completed anticoagulation for 48 hours  - now euvolemic, on PO lasix      Multifocal atrial tachycardia  - noted on admit, now resolved      Palliative care encounter  Advance Care Planning     Date: 02/22/2022    [facility]  I engaged the wife in a conversation about advance care planning and we specifically addressed what the goals of care would be moving forward, in light of the patient's change in clinical status.  We did specifically address the patient's likely prognosis, which is fair .  We explored the patient's values and preferences for future care.  The wife endorses that what is most important right now is to focus on curative/life-prolongation (regardless of treatment burdens)    Accordingly, we have decided that the best plan to meet the patient's  goals includes continuing with treatment    I did not explain the role for hospice care at this stage of the patient's illness, including its ability to help the patient live with the best quality of life possible.  We will not be making a hospice referral.    I spent a total of > 15 minutes engaging the patient in this advance care planning discussion.    Dr Tran states she had a code status conversation with patient prior to intubation. Patient prefers aggressive care and full code. Wife confirms that these are his wishes.              Lactic acidosis  Resolved with fluids      Hyperglycemia   HgbA1c not consistent with diabetes. Stress induced?        High anion gap metabolic acidosis  Likely due to lactic acidosis. Resolved         Hyperphosphatemia  PTH high. Hyperphosphatemia resolved.       Hypokalemia  Replaced. Daily Potassium with furosemide      NSTEMI (non-ST elevated myocardial infarction)  Aspirin and clopidogrel given. Statin. Completed 48 hours of full dose anticoagulation. Echocardiogram significant for LVEF 35-40%, which is new. Plan for [facility]. Patient and wife would like to discuss this with his oncologist before any agreement.   BB and ACE-I when able to tolerate - start on metoprolol  - planning for 3/4/22 - non-obstructive CAD, no intervention performed      Primary hypertension  - slowly creeping up  - will start to add back medications, particularly GDMT      Immunodeficiency due to drugs  Noted. Is not leukopenic or neutropenic      Secondary malignant neoplasm of bone  Noted       Lung cancer, main bronchus, left  Noted. Is stage 4. On palliative chemotherapy. Followed by Dr Hathaway as outpatient      History of completed stroke  Noted   - repeat MRI notes stable findings    Macrocytic anemia  No indication for transfusion at this time    Former smoker  Per patient's wife, patient has been abstinent for 3 years        VTE Risk Mitigation (From admission, onward)         Ordered     heparin  infusion 1,000 units/500 ml in 0.9% NaCl (pressure line flush)  Intra-op continuous PRN         03/04/22 0827     enoxaparin injection 40 mg  Daily         02/24/22 1446                Discharge Planning   [place]:      Code Status: Full Code   Is the patient medically ready for discharge?:     Reason for patient still in hospital (select all that apply): Pending disposition  Discharge Plan A: Home with family   Discharge Delays: None known at this time      Possible discharge later today        Rolando Knott MD  Department of [facility] Medicine   [facility]

## 2022-03-04 NOTE — ASSESSMENT & PLAN NOTE
Aspirin and clopidogrel given. Statin. Completed 48 hours of full dose anticoagulation. Echocardiogram significant for LVEF 35-40%, which is new. Plan for LHC. Patient and wife would like to discuss this with his oncologist before any agreement.   BB and ACE-I when able to tolerate - start on metoprolol  - planning for 3/4/22 - non-obstructive CAD, no intervention performed

## 2022-03-04 NOTE — PROGRESS NOTES
[facility] [place]  Progress Note    Patient Name: Kashif Iverson  MRN: [id number]  Admission Date: 2/22/2022  Hospital Length of Stay: 10 days  Code Status: Full Code   Attending Provider: Rolando Knott MD  Primary Care Physician: Eduardo Ruiz MD   Principal Problem:Acute respiratory failure with hypoxia and hypercapnia    Subjective:     Interval History: 62 y/o male with medical Hx of lung CA, HTN comes to ED for shortness of breath. Found to be be in respiratory failure with hypoxia/hypercapnia. Pt was intubated. Stay complicated by NSTEMI and hyperglycemia. Mr. Iverson was later extubated and transferred to floor after being successfully treated. Pt noted to have periods of confusion and even agitation. No seizures or unlateral weakness.     -3/4/22: No reports of agitation or confusion today.    Current Neurological Medications:     Current Facility-Administered Medications   Medication Dose Route Frequency Provider Last Rate Last Admin    0.9%  NaCl infusion   Intravenous Continuous Robson Green MD   Stopped at 03/04/22 0925    acetaminophen tablet 650 mg  650 mg Oral Q4H PRN Abbey Conti MD   650 mg at 03/01/22 1830    acetaminophen tablet 650 mg  650 mg Oral Q4H PRN Robson Green MD        aspirin chewable tablet 81 mg  81 mg Oral Daily Rolando Knott MD   81 mg at 03/04/22 0728    atorvastatin tablet 80 mg  80 mg Oral Daily Rolando Knott MD   80 mg at 03/04/22 0728    clopidogreL tablet 75 mg  75 mg Oral Daily Rolando Knott MD   75 mg at 03/04/22 0728    enoxaparin injection 40 mg  40 mg Subcutaneous Daily Abbey Conti MD   40 mg at 03/03/22 1728    furosemide tablet 40 mg  40 mg Oral Daily Abbey Conti MD   40 mg at 03/04/22 0727    heparin infusion 1,000 units/500 ml in 0.9% NaCl (pressure line flush)    Continuous PRN Robson Green  mL/hr at 03/04/22 0826 1,500 Units/hr at 03/04/22 0826    HYDROcodone-acetaminophen 5-325 mg per tablet 1 tablet  1  tablet Oral Q4H PRN Robson Green MD        magnesium oxide tablet 400 mg  400 mg Oral Daily Abbey Conti MD   400 mg at 03/04/22 0726    metoprolol tartrate (LOPRESSOR) tablet 25 mg  25 mg Oral BID Rolando Knott MD   25 mg at 03/04/22 0727    nitroGLYCERIN SL tablet 0.4 mg  0.4 mg Sublingual Q5 Min PRN Robson Green MD        olanzapine zydis disintegrating tablet 5 mg  5 mg Oral Q8H PRN Abbey Conti MD   5 mg at 03/03/22 2056    ondansetron disintegrating tablet 8 mg  8 mg Oral Q8H PRN Robson Green MD        potassium phosphate (monobasic) tablet 500 mg  500 mg Oral Daily Rolando Knott MD   500 mg at 03/04/22 0728    sodium chloride 0.9% flush 10 mL  10 mL Intravenous Q6H Abbey Conti MD   10 mL at 03/04/22 0600    And    sodium chloride 0.9% flush 10 mL  10 mL Intravenous PRN Abbey Conti MD   10 mL at 02/25/22 2209       Review of Systems   Constitutional: Negative for fever.   HENT: Negative for trouble swallowing.    Eyes: Negative for photophobia.   Respiratory: Negative for shortness of breath.    Cardiovascular: Negative for chest pain.   Gastrointestinal: Negative for abdominal pain.   Genitourinary: Negative for flank pain.   Musculoskeletal: Negative for back pain.   Neurological: Negative for headaches.     Objective:     Vital Signs (Most Recent):  Temp: 98.2 °F (36.8 °C) (03/04/22 1143)  Pulse: 65 (03/04/22 1143)  Resp: 18 (03/04/22 1143)  BP: 112/70 (03/04/22 1143)  SpO2: 100 % (03/04/22 1143) Vital Signs (24h Range):  Temp:  [98.2 °F (36.8 °C)-98.5 °F (36.9 °C)] 98.2 °F (36.8 °C)  Pulse:  [62-92] 65  Resp:  [18] 18  SpO2:  [96 %-100 %] 100 %  BP: (104-138)/(60-82) 112/70     Weight: 86.9 kg (191 lb 9.3 oz)  Body mass index is 27.49 kg/m².    Physical Exam  Constitutional:       General: He is not in acute distress.  HENT:      Head: Normocephalic.   Eyes:      General:         Right eye: No discharge.         Left eye: No discharge.   Cardiovascular:       Rate and Rhythm: Normal rate.   Pulmonary:      Breath sounds: Normal breath sounds.   Abdominal:      Palpations: Abdomen is soft.   Musculoskeletal:         General: No tenderness.      Cervical back: Neck supple.   Skin:     General: Skin is warm.   Neurological:      Deep Tendon Reflexes: Strength normal.   Psychiatric:         Speech: Speech normal.            NEUROLOGICAL EXAMINATION:      MENTAL STATUS   Speech: speech is normal   Level of consciousness: alert       Oriented to person, place      CRANIAL NERVES      CN III, IV, VI   Right pupil: Size: 2 mm. Shape: regular.   Left pupil: Size: 2 mm. Shape: regular.   Ophthalmoparesis: none  Conjugate gaze: present     CN VII   Right facial weakness: none  Left facial weakness: none     CN XII   Tongue deviation: none     MOTOR EXAM      Strength   Strength 5/5 throughout.        Significant Labs: CBC: No results for input(s): WBC, HGB, HCT, PLT in the last 48 hours.  CMP: No results for input(s): GLU, NA, K, CL, CO2, BUN, CREATININE, CALCIUM, MG, PROT, ALBUMIN, BILITOT, ALKPHOS, AST, ALT, ANIONGAP, EGFRNONAA in the last 48 hours.    Significant Imaging: I have reviewed all pertinent imaging results/findings within the past 24 hours.    Assessment and Plan:     60 y/o male consulted for AMS     1. Encephalopathy: fluctuation on level awareness and mentation could be from delirium due to acute illness, hospital stay. Pt could also be sleep deprived.           MRI brain was done due to Hx go CA to R/O any CNS involvement. No evident etiology on imaging for AMS.           Exam shows no motor or major sensory deficits.           -If needed quetiapine 25 mg nightly. Melatonin 6 mg nightly.           -Will continue to monitor.    Active Diagnoses:    Diagnosis Date Noted POA    PRINCIPAL PROBLEM:  Acute respiratory failure with hypoxia and hypercapnia [J96.01, J96.02] 02/22/2022 Yes    Delirium [R41.0] 03/02/2022 No    Cardiogenic shock [R57.0] 02/24/2022 No     Multifocal atrial tachycardia [I47.1] 02/23/2022 Yes    Acute systolic heart failure [I50.21] 02/23/2022 Yes    NSTEMI (non-ST elevated myocardial infarction) [I21.4] 02/22/2022 Yes    Hypokalemia [E87.6] 02/22/2022 Yes    Hyperphosphatemia [E83.39] 02/22/2022 Yes    High anion gap metabolic acidosis [E87.2] 02/22/2022 Yes    Hyperglycemia [R73.9] 02/22/2022 Yes    Lactic acidosis [E87.2] 02/22/2022 Yes    Palliative care encounter [Z51.5] 02/22/2022 Not Applicable    Primary hypertension [I10]  Yes    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, Z79.899] 08/17/2021 Yes    Secondary malignant neoplasm of bone [C79.51] 09/24/2019 Yes    Lung cancer, main bronchus, left [C34.02] 09/10/2019 Yes    History of completed stroke [Z86.73] 09/03/2019 Not Applicable    Macrocytic anemia [D53.9] 08/24/2019 Yes    Former smoker [Z87.891] 08/23/2019 Not Applicable      Problems Resolved During this Admission:       VTE Risk Mitigation (From admission, onward)         Ordered     heparin infusion 1,000 units/500 ml in 0.9% NaCl (pressure line flush)  Intra-op continuous PRN         03/04/22 0827     enoxaparin injection 40 mg  Daily         02/24/22 1446                José Mendoza MD  Neurology  [facility] - [place] - Telemetry

## 2022-03-05 VITALS
BODY MASS INDEX: 27.42 KG/M2 | TEMPERATURE: 99 F | HEART RATE: 76 BPM | SYSTOLIC BLOOD PRESSURE: 119 MMHG | OXYGEN SATURATION: 96 % | WEIGHT: 191.56 LBS | RESPIRATION RATE: 18 BRPM | DIASTOLIC BLOOD PRESSURE: 71 MMHG | HEIGHT: 70 IN

## 2022-03-05 LAB — PHOSPHATE SERPL-MCNC: 3.1 MG/DL (ref 2.7–4.5)

## 2022-03-05 PROCEDURE — 25000003 PHARM REV CODE 250: Performed by: STUDENT IN AN ORGANIZED HEALTH CARE EDUCATION/TRAINING PROGRAM

## 2022-03-05 PROCEDURE — 94761 N-INVAS EAR/PLS OXIMETRY MLT: CPT

## 2022-03-05 PROCEDURE — 99232 SBSQ HOSP IP/OBS MODERATE 35: CPT | Mod: ,,, | Performed by: PSYCHIATRY & NEUROLOGY

## 2022-03-05 PROCEDURE — 25000003 PHARM REV CODE 250: Performed by: INTERNAL MEDICINE

## 2022-03-05 PROCEDURE — 99232 SBSQ HOSP IP/OBS MODERATE 35: CPT | Mod: ,,, | Performed by: INTERNAL MEDICINE

## 2022-03-05 PROCEDURE — A4216 STERILE WATER/SALINE, 10 ML: HCPCS | Performed by: INTERNAL MEDICINE

## 2022-03-05 PROCEDURE — 84100 ASSAY OF PHOSPHORUS: CPT | Performed by: INTERNAL MEDICINE

## 2022-03-05 PROCEDURE — 36415 COLL VENOUS BLD VENIPUNCTURE: CPT | Performed by: INTERNAL MEDICINE

## 2022-03-05 PROCEDURE — 99232 PR SUBSEQUENT HOSPITAL CARE,LEVL II: ICD-10-PCS | Mod: ,,, | Performed by: INTERNAL MEDICINE

## 2022-03-05 PROCEDURE — 99232 PR SUBSEQUENT HOSPITAL CARE,LEVL II: ICD-10-PCS | Mod: ,,, | Performed by: PSYCHIATRY & NEUROLOGY

## 2022-03-05 RX ORDER — FUROSEMIDE 40 MG/1
40 TABLET ORAL DAILY
Qty: 30 TABLET | Refills: 11 | Status: ON HOLD | OUTPATIENT
Start: 2022-03-05 | End: 2022-05-22 | Stop reason: SDUPTHER

## 2022-03-05 RX ORDER — NAPROXEN SODIUM 220 MG/1
81 TABLET, FILM COATED ORAL DAILY
Qty: 30 TABLET | Refills: 11 | Status: SHIPPED | OUTPATIENT
Start: 2022-03-05 | End: 2024-03-08

## 2022-03-05 RX ORDER — CLOPIDOGREL BISULFATE 75 MG/1
75 TABLET ORAL DAILY
Qty: 30 TABLET | Refills: 11 | Status: SHIPPED | OUTPATIENT
Start: 2022-03-05 | End: 2023-01-01

## 2022-03-05 RX ORDER — ATORVASTATIN CALCIUM 80 MG/1
80 TABLET, FILM COATED ORAL DAILY
Qty: 90 TABLET | Refills: 3 | Status: SHIPPED | OUTPATIENT
Start: 2022-03-05 | End: 2023-01-01 | Stop reason: SDUPTHER

## 2022-03-05 RX ADMIN — ASPIRIN 81 MG CHEWABLE TABLET 81 MG: 81 TABLET CHEWABLE at 09:03

## 2022-03-05 RX ADMIN — POTASSIUM PHOSPHATE, MONOBASIC 500 MG: 500 TABLET, SOLUBLE ORAL at 09:03

## 2022-03-05 RX ADMIN — ATORVASTATIN CALCIUM 80 MG: 40 TABLET, FILM COATED ORAL at 09:03

## 2022-03-05 RX ADMIN — Medication 10 ML: at 12:03

## 2022-03-05 RX ADMIN — CLOPIDOGREL 75 MG: 75 TABLET, FILM COATED ORAL at 09:03

## 2022-03-05 RX ADMIN — METOPROLOL TARTRATE 25 MG: 25 TABLET, FILM COATED ORAL at 09:03

## 2022-03-05 RX ADMIN — FUROSEMIDE 40 MG: 40 TABLET ORAL at 09:03

## 2022-03-05 RX ADMIN — Medication 10 ML: at 06:03

## 2022-03-05 RX ADMIN — Medication 400 MG: at 09:03

## 2022-03-05 NOTE — PROGRESS NOTES
[facility] - Telemetry  Cardiology  Progress Note    Patient Name: Kashif Iverson  MRN: [id number]  Admission Date: 2/22/2022  Hospital Length of Stay: 11 days  Code Status: Full Code   Attending Physician: Rolando Knott MD   Primary Care Physician: Eduardo Ruiz MD  Expected Discharge Date: 3/5/2022  Principal Problem:Acute respiratory failure with hypoxia and hypercapnia    Subjective:     Interval Hx: cath yesterday with nonobst CAD.  No cp/sob.          Review of Systems   Gastrointestinal:  Negative for melena.   Genitourinary:  Negative for hematuria.   Objective:     Vital Signs (Most Recent):  Temp: 98.3 °F (36.8 °C) (03/05/22 0822)  Pulse: 99 (03/05/22 0822)  Resp: 18 (03/05/22 0822)  BP: 116/85 (03/05/22 0822)  SpO2: 96 % (03/05/22 0822) Vital Signs (24h Range):  Temp:  [97.9 °F (36.6 °C)-98.7 °F (37.1 °C)] 98.3 °F (36.8 °C)  Pulse:  [64-99] 99  Resp:  [18] 18  SpO2:  [96 %-100 %] 96 %  BP: (110-142)/(62-85) 116/85     Weight: 86.9 kg (191 lb 9.3 oz)  Body mass index is 27.49 kg/m².     SpO2: 96 %  O2 Device (Oxygen Therapy): nasal cannula      Intake/Output Summary (Last 24 hours) at 3/5/2022 1113  Last data filed at 3/4/2022 1800  Gross per 24 hour   Intake 240 ml   Output 900 ml   Net -660 ml       Lines/Drains/Airways       Central Venous Catheter Line  Duration             Port A Cath Single Lumen right subclavian -- days              Peripheral Intravenous Line  Duration                  Peripheral IV - Single Lumen 03/04/22 0030 22 G Anterior;Left Hand 1 day                    Physical Exam  Constitutional:       General: He is not in acute distress.     Appearance: He is well-developed. He is not ill-appearing, toxic-appearing or diaphoretic.   HENT:      Head: Normocephalic and atraumatic.   Eyes:      General: No scleral icterus.     Extraocular Movements: Extraocular movements intact.      Conjunctiva/sclera: Conjunctivae normal.      Pupils: Pupils are equal, round, and reactive to light.   Neck:       Thyroid: No thyromegaly.      Vascular: No JVD.      Trachea: No tracheal deviation.   Cardiovascular:      Rate and Rhythm: Normal rate and regular rhythm.      Heart sounds: S1 normal and S2 normal. No murmur heard.    No friction rub. No gallop.      Comments: RFA access site with mild ecchymosis, no hematoma.  Pulmonary:      Effort: Pulmonary effort is normal. No respiratory distress.      Breath sounds: Normal breath sounds. No stridor. No wheezing, rhonchi or rales.   Chest:      Chest wall: No tenderness.   Abdominal:      General: There is no distension.      Palpations: Abdomen is soft.   Musculoskeletal:         General: No swelling or tenderness. Normal range of motion.      Cervical back: Normal range of motion and neck supple. No rigidity.      Right lower leg: No edema.      Left lower leg: No edema.   Skin:     General: Skin is warm and dry.      Coloration: Skin is not jaundiced.      Findings: No rash.   Neurological:      General: No focal deficit present.      Mental Status: He is alert and oriented to person, place, and time.      Cranial Nerves: No cranial nerve deficit.   Psychiatric:         Mood and Affect: Mood normal.         Behavior: Behavior normal.     Current Medications:   aspirin  81 mg Oral Daily    atorvastatin  80 mg Oral Daily    clopidogreL  75 mg Oral Daily    enoxaparin  40 mg Subcutaneous Daily    furosemide  40 mg Oral Daily    magnesium oxide  400 mg Oral Daily    metoprolol tartrate  25 mg Oral BID    potassium phosphate (monobasic)  500 mg Oral Daily    sodium chloride 0.9%  10 mL Intravenous Q6H       acetaminophen, acetaminophen, HYDROcodone-acetaminophen, nitroGLYCERIN, olanzapine zydis, ondansetron, Flushing PICC Protocol **AND** sodium chloride 0.9% **AND** sodium chloride 0.9%    Laboratory (all labs reviewed):  CBC:  Recent Labs   Lab 02/26/22  0542 02/27/22  0441 02/28/22  0442 03/04/22  0338 03/04/22  0424   WBC 9.43 13.83 H 12.56 9.91 9.65    Hemoglobin 10.6 L 12.3 L 13.0 L 13.1 L 11.8 L   Hematocrit 32.1 L 38.4 L 40.4 42.2 37.1 L   Platelets 194 262 276 331 329       CHEMISTRIES:  Recent Labs   Lab 11/05/20  1107 12/07/20  1111 02/22/22  1452 02/22/22  1910 02/23/22  0407 02/23/22  1258 02/25/22  0503 02/26/22  0542 02/28/22  0442 02/28/22  1030 03/01/22  0400 03/02/22  0504 03/04/22  0338 03/04/22  0424   Glucose 114 H   < > 280 H   < > 178 H   < > 195 H   < > 98  --  102 106 88 103   Sodium 141   < > 134 L   < > 133 L   < > 141   < > 142  --  140 143 140 139   Potassium 4.4   < > 3.0 L   < > 2.6 LL   < > 4.5   < > 2.8 L 2.9 L 3.3 L 3.3 L 3.7 3.5   BUN 10   < > 8   < > 9   < > 9   < > 20  --  19 22 22 22   Creatinine 0.8   < > 1.0   < > 1.0   < > 1.2   < > 1.1  --  1.0 1.2 1.1 1.0   eGFR if African American >60.0   < > >60   < > >60   < > >60   < > >60  --  >60 >60 >60 >60   eGFR if non  >60.0   < > >60   < > >60   < > >60   < > >60  --  >60 >60 >60 >60   Calcium 9.6   < > 8.4 L   < > 7.5 L   < > 8.8   < > 9.0  --  9.1 9.2 8.7 8.5 L   Magnesium 1.9  --  1.8  --  1.6  --  1.7  --   --  1.7  --   --   --   --     < > = values in this interval not displayed.       CARDIAC BIOMARKERS:  Recent Labs   Lab 08/22/19  1729 02/22/22  1452 02/22/22  2222 02/23/22  0407 02/24/22  1045   Troponin I <0.012 0.917 H 1.759 H 1.018 H 0.610 H       COAGS:  Recent Labs   Lab 10/13/20  1031 11/05/20  1107 12/23/21  1030 02/22/22  1452 03/04/22  0424   INR 1.0 1.0 1.0 1.2 1.1       LIPIDS/LFTS:  Recent Labs   Lab 03/06/20  0937 03/27/20  1012 05/06/20  1159 05/27/20  1125 09/09/20  0956 09/28/20  0722 08/17/21  0733 08/24/21  0846 02/23/22  0407 02/23/22  1258 02/25/22  0503 02/26/22  0542 02/27/22  0441 02/28/22  0442 03/01/22  0400   Cholesterol 180  --  203 H  --  183  --  186  --  125  --   --   --   --   --   --    Triglycerides 81  --  181 H  --  87  --  172 H  --  224 H  --   --   --   --   --   --    HDL 42  --  53  --  33 L  --  38 L  --  26 L  --    --   --   --   --   --    LDL Cholesterol 121.8  --  113.8  --  132.6  --  113.6  --  54.2 L  --   --   --   --   --   --    Non-HDL Cholesterol 138  --  150  --  150  --  148  --  99  --   --   --   --   --   --    AST 15   < > 21   < > 18   < > 20   < > 29   < > 25 21 42 H 35 32   ALT 27   < > 37   < > 24   < > 26   < > 19   < > 18 18 31 32 30    < > = values in this interval not displayed.       BNP:  Recent Labs   Lab 02/22/22  1452 02/24/22  1045    H 1,471 H       TSH:  Recent Labs   Lab 08/19/20  0950 09/09/20  0956 11/05/20  1107 01/20/21  1148 02/22/22  1452   TSH 1.094 0.830 0.575 0.888 1.310       Free T4:  Recent Labs   Lab 11/05/20  1107 01/20/21  1148   Free T4 1.02 1.06       Diagnostic Results:    Echo: 2/23/22  · The left ventricle is normal in size with concentric hypertrophy and moderately decreased systolic function.  · The estimated ejection fraction is 35-40%.  · Grade I left ventricular diastolic dysfunction.  · Mild right ventricular enlargement with normal right ventricular systolic function.  · Mild left atrial enlargement.  · Mild right atrial enlargement.  · Mild tricuspid regurgitation.      Cath: 3/4/22 ([facility])  EDP 15, LAD 20% proximal 30% mid at D1, Cx/RCA with luminal irregularities        Assessment and Plan:     * Acute respiratory failure with hypoxia and hypercapnia  Resolved    NSTEMI (non-ST elevated myocardial infarction)  Peak troponin 1.7. Suspect demand ischemia from tachycardia, respiratory distress and acidosis. Echo with EF 35-40% ? Tachymyopathy. Will discuss ischemic evaluation when more stable.    Cath 3/4 with nonobst CAD, med rx planned.  Cont asa/plavix/statin.        Former smoker  Smokingcessation      Lung cancer, main bronchus, left  Per primary    Cardiogenic shock  Resolved    High anion gap metabolic acidosis  Mgmt per IM    Multifocal atrial tachycardia  New Dx. Related to underlying pulmonary issues. Now resolved        VTE Risk Mitigation (From  admission, onward)         Ordered     enoxaparin injection 40 mg  Daily         02/24/22 1446              Cardiology will sign off, pls call with questions.  Pt to follow up with Dr. Green after discharge.    Shan Verma MD  Cardiology  [facility]

## 2022-03-05 NOTE — ASSESSMENT & PLAN NOTE
Peak troponin 1.7. Suspect demand ischemia from tachycardia, respiratory distress and acidosis. Echo with EF 35-40% ? Tachymyopathy. Will discuss ischemic evaluation when more stable.    Cath 3/4 with nonobst CAD, med rx planned.

## 2022-03-05 NOTE — SUBJECTIVE & OBJECTIVE
Review of Systems   Gastrointestinal:  Negative for melena.   Genitourinary:  Negative for hematuria.   Objective:     Vital Signs (Most Recent):  Temp: 98.3 °F (36.8 °C) (03/05/22 0822)  Pulse: 99 (03/05/22 0822)  Resp: 18 (03/05/22 0822)  BP: 116/85 (03/05/22 0822)  SpO2: 96 % (03/05/22 0822) Vital Signs (24h Range):  Temp:  [97.9 °F (36.6 °C)-98.7 °F (37.1 °C)] 98.3 °F (36.8 °C)  Pulse:  [64-99] 99  Resp:  [18] 18  SpO2:  [96 %-100 %] 96 %  BP: (110-142)/(62-85) 116/85     Weight: 86.9 kg (191 lb 9.3 oz)  Body mass index is 27.49 kg/m².     SpO2: 96 %  O2 Device (Oxygen Therapy): nasal cannula      Intake/Output Summary (Last 24 hours) at 3/5/2022 1113  Last data filed at 3/4/2022 1800  Gross per 24 hour   Intake 240 ml   Output 900 ml   Net -660 ml       Lines/Drains/Airways       Central Venous Catheter Line  Duration             Port A Cath Single Lumen right subclavian -- days              Peripheral Intravenous Line  Duration                  Peripheral IV - Single Lumen 03/04/22 0030 22 G Anterior;Left Hand 1 day                    Physical Exam  Constitutional:       General: He is not in acute distress.     Appearance: He is well-developed. He is not ill-appearing, toxic-appearing or diaphoretic.   HENT:      Head: Normocephalic and atraumatic.   Eyes:      General: No scleral icterus.     Extraocular Movements: Extraocular movements intact.      Conjunctiva/sclera: Conjunctivae normal.      Pupils: Pupils are equal, round, and reactive to light.   Neck:      Thyroid: No thyromegaly.      Vascular: No JVD.      Trachea: No tracheal deviation.   Cardiovascular:      Rate and Rhythm: Normal rate and regular rhythm.      Heart sounds: S1 normal and S2 normal. No murmur heard.    No friction rub. No gallop.      Comments: RFA access site with mild ecchymosis, no hematoma.  Pulmonary:      Effort: Pulmonary effort is normal. No respiratory distress.      Breath sounds: Normal breath sounds. No stridor. No  wheezing, rhonchi or rales.   Chest:      Chest wall: No tenderness.   Abdominal:      General: There is no distension.      Palpations: Abdomen is soft.   Musculoskeletal:         General: No swelling or tenderness. Normal range of motion.      Cervical back: Normal range of motion and neck supple. No rigidity.      Right lower leg: No edema.      Left lower leg: No edema.   Skin:     General: Skin is warm and dry.      Coloration: Skin is not jaundiced.      Findings: No rash.   Neurological:      General: No focal deficit present.      Mental Status: He is alert and oriented to person, place, and time.      Cranial Nerves: No cranial nerve deficit.   Psychiatric:         Mood and Affect: Mood normal.         Behavior: Behavior normal.     Current Medications:   aspirin  81 mg Oral Daily    atorvastatin  80 mg Oral Daily    clopidogreL  75 mg Oral Daily    enoxaparin  40 mg Subcutaneous Daily    furosemide  40 mg Oral Daily    magnesium oxide  400 mg Oral Daily    metoprolol tartrate  25 mg Oral BID    potassium phosphate (monobasic)  500 mg Oral Daily    sodium chloride 0.9%  10 mL Intravenous Q6H       acetaminophen, acetaminophen, HYDROcodone-acetaminophen, nitroGLYCERIN, olanzapine zydis, ondansetron, Flushing PICC Protocol **AND** sodium chloride 0.9% **AND** sodium chloride 0.9%    Laboratory (all labs reviewed):  CBC:  Recent Labs   Lab 02/26/22  0542 02/27/22  0441 02/28/22  0442 03/04/22  0338 03/04/22  0424   WBC 9.43 13.83 H 12.56 9.91 9.65   Hemoglobin 10.6 L 12.3 L 13.0 L 13.1 L 11.8 L   Hematocrit 32.1 L 38.4 L 40.4 42.2 37.1 L   Platelets 194 262 276 331 329       CHEMISTRIES:  Recent Labs   Lab 11/05/20  1107 12/07/20  1111 02/22/22  1452 02/22/22  1910 02/23/22  0407 02/23/22  1258 02/25/22  0503 02/26/22  0542 02/28/22  0442 02/28/22  1030 03/01/22  0400 03/02/22  0504 03/04/22  0338 03/04/22  0424   Glucose 114 H   < > 280 H   < > 178 H   < > 195 H   < > 98  --  102 106 88 103   Sodium 141   < >  134 L   < > 133 L   < > 141   < > 142  --  140 143 140 139   Potassium 4.4   < > 3.0 L   < > 2.6 LL   < > 4.5   < > 2.8 L 2.9 L 3.3 L 3.3 L 3.7 3.5   BUN 10   < > 8   < > 9   < > 9   < > 20  --  19 22 22 22   Creatinine 0.8   < > 1.0   < > 1.0   < > 1.2   < > 1.1  --  1.0 1.2 1.1 1.0   eGFR if African American >60.0   < > >60   < > >60   < > >60   < > >60  --  >60 >60 >60 >60   eGFR if non  >60.0   < > >60   < > >60   < > >60   < > >60  --  >60 >60 >60 >60   Calcium 9.6   < > 8.4 L   < > 7.5 L   < > 8.8   < > 9.0  --  9.1 9.2 8.7 8.5 L   Magnesium 1.9  --  1.8  --  1.6  --  1.7  --   --  1.7  --   --   --   --     < > = values in this interval not displayed.       CARDIAC BIOMARKERS:  Recent Labs   Lab 08/22/19  1729 02/22/22  1452 02/22/22  2222 02/23/22  0407 02/24/22  1045   Troponin I <0.012 0.917 H 1.759 H 1.018 H 0.610 H       COAGS:  Recent Labs   Lab 10/13/20  1031 11/05/20  1107 12/23/21  1030 02/22/22  1452 03/04/22  0424   INR 1.0 1.0 1.0 1.2 1.1       LIPIDS/LFTS:  Recent Labs   Lab 03/06/20  0937 03/27/20  1012 05/06/20  1159 05/27/20  1125 09/09/20  0956 09/28/20  0722 08/17/21  0733 08/24/21  0846 02/23/22  0407 02/23/22  1258 02/25/22  0503 02/26/22  0542 02/27/22  0441 02/28/22  0442 03/01/22  0400   Cholesterol 180  --  203 H  --  183  --  186  --  125  --   --   --   --   --   --    Triglycerides 81  --  181 H  --  87  --  172 H  --  224 H  --   --   --   --   --   --    HDL 42  --  53  --  33 L  --  38 L  --  26 L  --   --   --   --   --   --    LDL Cholesterol 121.8  --  113.8  --  132.6  --  113.6  --  54.2 L  --   --   --   --   --   --    Non-HDL Cholesterol 138  --  150  --  150  --  148  --  99  --   --   --   --   --   --    AST 15   < > 21   < > 18   < > 20   < > 29   < > 25 21 42 H 35 32   ALT 27   < > 37   < > 24   < > 26   < > 19   < > 18 18 31 32 30    < > = values in this interval not displayed.       BNP:  Recent Labs   Lab 02/22/22  1452 02/24/22  1045    H  1,471 H       TSH:  Recent Labs   Lab 08/19/20  0950 09/09/20  0956 11/05/20  1107 01/20/21  1148 02/22/22  1452   TSH 1.094 0.830 0.575 0.888 1.310       Free T4:  Recent Labs   Lab 11/05/20  1107 01/20/21  1148   Free T4 1.02 1.06       Diagnostic Results:    Echo: 2/23/22  The left ventricle is normal in size with concentric hypertrophy and moderately decreased systolic function.  The estimated ejection fraction is 35-40%.  Grade I left ventricular diastolic dysfunction.  Mild right ventricular enlargement with normal right ventricular systolic function.  Mild left atrial enlargement.  Mild right atrial enlargement.  Mild tricuspid regurgitation.      Cath: 3/4/22 (Peter)  EDP 15, LAD 20% proximal 30% mid at D1, Cx/RCA with luminal irregularities

## 2022-03-05 NOTE — PLAN OF CARE
[facility] - Telemetry  Discharge Final Note  TN informed telemetry nurse Rosemary that patient is cleared for discharge from case management's viewpoint.    Primary Care Provider: Eduardo Ruiz MD    Expected Discharge Date: 3/5/2022    Final Discharge Note (most recent)     Final Note - 03/05/22 0937        Final Note    Assessment Type Final Discharge Note     Anticipated Discharge Disposition Home or Self Care     What phone number can be called within the next 1-3 days to see how you are doing after discharge? --   see chart    Hospital Resources/Appts/Education Provided Provided patient/caregiver with written discharge plan information;Appointments scheduled by Navigator/Coordinator        Post-Acute Status    Discharge Delays None known at this time                 Important Message from Medicare  Important Message from Medicare regarding Discharge Appeal Rights: Given to patient/caregiver, Explained to patient/caregiver, Signed/date by patient/caregiver     Date IMM was signed: 03/03/22  Time IMM was signed: 1432    Contact Info     Eduardo Ruiz MD   Specialty: Family Medicine   Relationship: PCP - General    [id number] [place] [id number]   Phone: [phone]       Next Steps: Follow up

## 2022-03-06 NOTE — NURSING
Patient discharge information given to patient prior to discharge. Called and spoke with patient spouse Natty and gave discharge information. IV D/C and tele D/C tolerated well. All personal belongings with patient at the time of discharge. Transported to personal vehicle via hospital transport wheelchair.

## 2022-03-07 NOTE — PROGRESS NOTES
[facility] [place]  Progress Note    Patient Name: Kashif Iverson  MRN: [id number]  Admission Date: 2/22/2022  Hospital Length of Stay: 11 days  Code Status: Prior   Attending Provider: No att. providers found  Primary Care Physician: Eduardo Ruiz MD   Principal Problem:Acute respiratory failure with hypoxia and hypercapnia    Subjective:     Interval History: 62 y/o male with medical Hx of lung CA, HTN comes to ED for shortness of breath. Found to be be in respiratory failure with hypoxia/hypercapnia. Pt was intubated. Stay complicated by NSTEMI and hyperglycemia. Mr. Iverson was later extubated and transferred to floor after being successfully treated. Pt noted to have periods of confusion and even agitation. No seizures or unlateral weakness.     -3/5/22: Mr. Iverson repots no new symptoms today. Denies headaches, visual or speech disturbances, unilateral weakenss or numbness.    Current Neurological Medications:     No current facility-administered medications for this encounter.     Current Outpatient Medications   Medication Sig Dispense Refill    ascorbic acid-multivit-min (VITAMIN C ENERGY BOOSTER) 1,000 mg PwEP Take by mouth. Patient takes 3,000 mg per day      aspirin 81 MG Chew Take 1 tablet (81 mg total) by mouth once daily. 30 tablet 11    atenoloL (TENORMIN) 50 MG tablet Take 1 tablet (50 mg total) by mouth once daily. 30 tablet 11    atorvastatin (LIPITOR) 80 MG tablet Take 1 tablet (80 mg total) by mouth once daily. 90 tablet 3    clopidogreL (PLAVIX) 75 mg tablet Take 1 tablet (75 mg total) by mouth once daily. 30 tablet 11    furosemide (LASIX) 40 MG tablet Take 1 tablet (40 mg total) by mouth once daily. 30 tablet 11    LIDOcaine-prilocaine (EMLA) cream Apply generously to port site 30-60 min prior to chemo and then cover with saran wrap. 30 g 2    ondansetron (ZOFRAN) 8 MG tablet Take 1 tablet (8 mg total) by mouth 4 (four) times daily as needed for Nausea. 60 tablet 2       Review  of Systems   Constitutional: Negative for fever.   HENT: Negative for trouble swallowing.    Eyes: Negative for photophobia.   Respiratory: Negative for shortness of breath.    Cardiovascular: Negative for chest pain.   Gastrointestinal: Negative for abdominal pain.   Genitourinary: Negative for flank pain.   Musculoskeletal: Negative for back pain.   Neurological: Negative for headaches.        Objective:     Vital Signs (Most Recent):  Temp: 98.8 °F (37.1 °C) (03/05/22 1119)  Pulse: 76 (03/05/22 1119)  Resp: 18 (03/05/22 1119)  BP: 119/71 (03/05/22 1119)  SpO2: 96 % (03/05/22 1300) Vital Signs (24h Range):        Weight: 86.9 kg (191 lb 9.3 oz)  Body mass index is 27.49 kg/m².    Physical Exam  Constitutional:       General: He is not in acute distress.  HENT:      Head: Normocephalic.   Eyes:      General:         Right eye: No discharge.         Left eye: No discharge.   Cardiovascular:      Rate and Rhythm: Normal rate.   Pulmonary:      Breath sounds: Normal breath sounds.   Abdominal:      Palpations: Abdomen is soft.   Musculoskeletal:         General: No tenderness.      Cervical back: Neck supple.   Skin:     General: Skin is warm.   Neurological:      Deep Tendon Reflexes: Strength normal.   Psychiatric:         Speech: Speech normal.            NEUROLOGICAL EXAMINATION:      MENTAL STATUS   Speech: speech is normal   Level of consciousness: alert       Oriented to person, place      CRANIAL NERVES      CN III, IV, VI   Right pupil: Size: 2 mm. Shape: regular.   Left pupil: Size: 2 mm. Shape: regular.   Ophthalmoparesis: none  Conjugate gaze: present     CN VII   Right facial weakness: none  Left facial weakness: none     CN XII   Tongue deviation: none     MOTOR EXAM      Strength   Strength 5/5 throughout.        Significant Labs: CBC: No results for input(s): WBC, HGB, HCT, PLT in the last 48 hours.  CMP: No results for input(s): GLU, NA, K, CL, CO2, BUN, CREATININE, CALCIUM, MG, PROT, ALBUMIN,  BILITOT, ALKPHOS, AST, ALT, ANIONGAP, EGFRNONAA in the last 48 hours.    Significant Imaging: I have reviewed all pertinent imaging results/findings within the past 24 hours.    Assessment and Plan:     60 y/o male consulted for AMS     1. Encephalopathy: fluctuation on level awareness and mentation could be from delirium due to acute illness, hospital stay. Pt could also be sleep deprived.    He is better today. No hallucinations or agitation.           MRI brain was done due to Hx go CA to R/O any CNS involvement. No evident etiology on imaging for AMS.           Exam shows no motor or major sensory deficits.           -If needed quetiapine 25 mg nightly.           -OK to D/C home from neurology standpoint.    Active Diagnoses:    Diagnosis Date Noted POA    PRINCIPAL PROBLEM:  Acute respiratory failure with hypoxia and hypercapnia [J96.01, J96.02] 02/22/2022 Yes    Delirium [R41.0] 03/02/2022 No    Cardiogenic shock [R57.0] 02/24/2022 No    Multifocal atrial tachycardia [I47.1] 02/23/2022 Yes    Acute systolic heart failure [I50.21] 02/23/2022 Yes    NSTEMI (non-ST elevated myocardial infarction) [I21.4] 02/22/2022 Yes    Hypokalemia [E87.6] 02/22/2022 Yes    Hyperphosphatemia [E83.39] 02/22/2022 Yes    High anion gap metabolic acidosis [E87.2] 02/22/2022 Yes    Hyperglycemia [R73.9] 02/22/2022 Yes    Lactic acidosis [E87.2] 02/22/2022 Yes    Palliative care encounter [Z51.5] 02/22/2022 Not Applicable    Primary hypertension [I10]  Yes    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

## 2022-03-09 NOTE — DISCHARGE SUMMARY
"[facility]  Discharge Summary      Patient Name: Kashif Iverson  MRN: [id number]  Patient Class: IP- Inpatient  Admission Date: 2/22/2022  Hospital Length of Stay: 11 days  Discharge Date and Time: 3/5/2022  4:07 PM  Attending Physician: No att. 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 Patient is currently intubated and unable to provide history. His wife, who is at bedside, states he was in his normal state up until today. States he had been eating and drinking just fine but did have to hide his "coke" which he drinks in excess. Apparently had "coke" again this AM as he felt that would help his breathing. He is currently on palliative chemotherapy with Gemcitabine, last dose given 2/15/2022 (about a week ago). Is no longer smoking.     In the ED, patient had progressive respiratory distress. Became diaphoretic and confused. Was also pulling oxygen mask. Patient was therefore intubated. Labwork significant for lactic acidosis of 8.7, hyperglycemia, HAGMA, hypokalemia, mild hyponatremia, hyperphosphatemia, BNP 700s, hyaline casts in UA and troponin 0.9. No ST segment elevation or depression. Cardiology and pulmonology consulted. Given fluids, empiric abx and admitted to ICU.       Procedure(s) (LRB):  ANGIOGRAM, CORONARY ARTERY (N/A)      Hospital Course:   Mr Iverson presented with acute respiratory failure with hypercapnia/hypoxia. Intubated in the ED. Started on broad spectrum antibiotics. Blood and resp cultures obtained. No pneumothorax, large consolidation or pulmonary embolism but with moderate right sided pleural effusion and known left main bronchial cancerous mass. Given fluids. Lactic acidosis resolved. Questionable diabetic ketoacidosis given " hyperglycemia and high anion gap metabolic acidosis. Placed on insulin infusion. Also started on full dose anticoagulation, double antiplatelet therapy and statin for suspected NSTEMI. Glucose normalized and HAGMA resolved. Insulin discontinued. Fluids stopped. HgbA1c < 6% which is not consistent with diabetes. Cardiology and pulmonology consulted. Echocardiogram showed LVEF 35%-40% with grade 1 diastolic dysfunction. Furosemide IV given. Extubated to NC on 2/23. Overnight patient became hyperactive and encephalopathic. Experienced respiratory failure. Re-intubated. Diuresed. Extubated on 2/25 with precedex in place to reduce anxiety/paranoia post extubation. This worked well and remained stable on low flow NC. Plan for LHC. PT/OT consulted for dispo. He was transferred to the floor. Family having long discussion about having heart cath, discussed with Oncologist and agreed to move forward. He had intermittent delirium, MRI Brain did not show any new lesions or strokes. This had resolved prior to discharge. LHC noted non-obstructive CAD. No intervention. Patient had progressed and required no therapy at time of discharge. Discussed plan with wife and patient. He was discharged home in stable condition to follow up with Oncology, PCP and Cardiology.       Goals of Care Treatment Preferences:  Code Status: Full Code          What is most important right now is to focus on curative/life-prolongation (regardless of treatment burdens).  Accordingly, we have decided that the best plan to meet the patient's goals includes continuing with treatment.      Consults:   Consults (From admission, onward)        Status Ordering Provider     Inpatient consult to Neurology  Once        Provider:  (Not yet assigned)    Completed RICK MEDINA     Inpatient consult to Registered Dietitian/Nutritionist  Once        Provider:  (Not yet assigned)    Completed RYAN MCCRAY     IP consult to dietary  Once        Provider:  (Not yet  assigned)    Completed KEITH OMALLEY     Inpatient consult to Pulmonology  Once        Provider:  Vivek Adair MD    Completed KEITH OMALLEY     Inpatient consult to Palliative Care  Once        Provider:  Eli Arango NP    Completed RAYMOND MOJICA     Inpatient consult to PICC team ([facility])  Once        Provider:  (Not yet assigned)    Completed KEITH OMALLEY     Inpatient consult to Registered Dietitian/Nutritionist  Once        Provider:  (Not yet assigned)    Completed KEITH OMALLEY     Inpatient consult to Cardiology  Once        Provider:  Catia Roman MD    Completed KEITH OMALLEY          No new Assessment & Plan notes have been filed under this hospital service since the last note was generated.  Service: Hospital Medicine    Final Active Diagnoses:    Diagnosis Date Noted POA    PRINCIPAL PROBLEM:  Acute respiratory failure with hypoxia and hypercapnia [J96.01, J96.02] 02/22/2022 Yes    Acute encephalopathy [G93.40]  No    Delirium [R41.0] 03/02/2022 No    Cardiogenic shock [R57.0] 02/24/2022 No    Multifocal atrial tachycardia [I47.1] 02/23/2022 Yes    Acute systolic heart failure [I50.21] 02/23/2022 Yes    NSTEMI (non-ST elevated myocardial infarction) [I21.4] 02/22/2022 Yes    Hypokalemia [E87.6] 02/22/2022 Yes    Hyperphosphatemia [E83.39] 02/22/2022 Yes    High anion gap metabolic acidosis [E87.2] 02/22/2022 Yes    Hyperglycemia [R73.9] 02/22/2022 Yes    Lactic acidosis [E87.2] 02/22/2022 Yes    Palliative care encounter [Z51.5] 02/22/2022 Not Applicable    Primary hypertension [I10]  Yes    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    Specialty: Family Medicine  Contact information:  [address] [place] [address] [phone]                       Patient Instructions:      Diet Cardiac     Lifting restrictions   Order Comments: For 2-3 days, no lifting about 15 pounds     Notify your health care provider if you experience any of the following:  temperature >100.4     Notify your health care provider if you experience any of the following:  persistent nausea and vomiting or diarrhea     Notify your health care provider if you experience any of the following:  severe uncontrolled pain     Notify your health care provider if you experience any of the following:  difficulty breathing or increased cough     Notify your health care provider if you experience any of the following:  severe persistent headache     Notify your health care provider if you experience any of the following:  worsening rash     Notify your health care provider if you experience any of the following:  persistent dizziness, light-headedness, or visual disturbances     Notify your health care provider if you experience any of the following:  increased confusion or weakness     Notify your health care provider if you experience any of the following:  redness, tenderness, or signs of infection (pain, swelling, redness, odor or green/yellow discharge around incision site)     Activity as tolerated       Significant Diagnostic Studies: Labs: All labs within the past 24 hours have been reviewed    Pending Diagnostic Studies:     None         Medications:  Reconciled Home Medications:      Medication List      START taking these medications    aspirin 81 MG Chew  Take 1 tablet (81 mg total) by mouth once daily.     atorvastatin 80 MG tablet  Commonly known as: LIPITOR  Take 1 tablet (80 mg  total) by mouth once daily.     clopidogreL 75 mg tablet  Commonly known as: PLAVIX  Take 1 tablet (75 mg total) by mouth once daily.     furosemide 40 MG tablet  Commonly known as: LASIX  Take 1 tablet (40 mg total) by mouth once daily.        CONTINUE taking these medications    atenoloL 50 MG tablet  Commonly known as: TENORMIN  Take 1 tablet (50 mg total) by mouth once daily.     LIDOcaine-prilocaine cream  Commonly known as: EMLA  Apply generously to port site 30-60 min prior to chemo and then cover with saran wrap.     ondansetron 8 MG tablet  Commonly known as: ZOFRAN  Take 1 tablet (8 mg total) by mouth 4 (four) times daily as needed for Nausea.     VITAMIN C ENERGY BOOSTER 1,000 mg Pwep  Generic drug: ascorbic acid-multivit-min  Take by mouth. Patient takes 3,000 mg per day        STOP taking these medications    amLODIPine 10 MG tablet  Commonly known as: NORVASC            Indwelling Lines/Drains at time of discharge:   Lines/Drains/Airways     Central Venous Catheter Line  Duration           Port A Cath Single Lumen right subclavian -- days                Time spent on the discharge of patient: >35 minutes         Rolando Knott MD  Department of Hospital Medicine  [facility] - Telemetry

## 2022-03-10 ENCOUNTER — HOSPITAL ENCOUNTER (OUTPATIENT)
Dept: RADIOLOGY | Facility: HOSPITAL | Age: 62
Discharge: HOME | End: 2022-03-10
Attending: PHYSICAL MEDICINE & REHABILITATION
Payer: MEDICARE

## 2022-03-10 ENCOUNTER — OFFICE VISIT (OUTPATIENT)
Dept: PHYSICAL MEDICINE AND REHAB | Facility: CLINIC | Age: 62
End: 2022-03-10
Payer: MEDICARE

## 2022-03-10 VITALS
HEIGHT: 70 IN | HEART RATE: 77 BPM | WEIGHT: 206.88 LBS | BODY MASS INDEX: 29.62 KG/M2 | SYSTOLIC BLOOD PRESSURE: 161 MMHG | DIASTOLIC BLOOD PRESSURE: 86 MMHG

## 2022-03-10 DIAGNOSIS — M79.604 CHRONIC PAIN OF RIGHT LOWER EXTREMITY: ICD-10-CM

## 2022-03-10 DIAGNOSIS — G89.29 CHRONIC PAIN OF RIGHT LOWER EXTREMITY: ICD-10-CM

## 2022-03-10 DIAGNOSIS — G89.29 CHRONIC PAIN OF RIGHT LOWER EXTREMITY: Primary | ICD-10-CM

## 2022-03-10 DIAGNOSIS — M79.604 CHRONIC PAIN OF RIGHT LOWER EXTREMITY: Primary | ICD-10-CM

## 2022-03-10 PROCEDURE — 99999 PR PBB SHADOW E&M-EST. PATIENT-LVL III: ICD-10-PCS | Mod: PBBFAC,,, | Performed by: PHYSICAL MEDICINE & REHABILITATION

## 2022-03-10 PROCEDURE — 73590 X-RAY EXAM OF LOWER LEG: CPT | Mod: TC,RT

## 2022-03-10 PROCEDURE — 73590 X-RAY EXAM OF LOWER LEG: CPT | Mod: 26,RT,, | Performed by: RADIOLOGY

## 2022-03-10 PROCEDURE — 99204 OFFICE O/P NEW MOD 45 MIN: CPT | Mod: S$PBB,,, | Performed by: PHYSICAL MEDICINE & REHABILITATION

## 2022-03-10 PROCEDURE — 99999 PR PBB SHADOW E&M-EST. PATIENT-LVL III: CPT | Mod: PBBFAC,,, | Performed by: PHYSICAL MEDICINE & REHABILITATION

## 2022-03-10 PROCEDURE — 99213 OFFICE O/P EST LOW 20 MIN: CPT | Mod: PBBFAC | Performed by: PHYSICAL MEDICINE & REHABILITATION

## 2022-03-10 PROCEDURE — 99204 PR OFFICE/OUTPT VISIT, NEW, LEVL IV, 45-59 MIN: ICD-10-PCS | Mod: S$PBB,,, | Performed by: PHYSICAL MEDICINE & REHABILITATION

## 2022-03-10 PROCEDURE — 73590 XR TIBIA FIBULA 2 VIEW RIGHT: ICD-10-PCS | Mod: 26,RT,, | Performed by: RADIOLOGY

## 2022-03-10 NOTE — PROGRESS NOTES
Subjective:       Patient ID: Kashif Iverson is a 61 y.o. male.    Chief Complaint: No chief complaint on file.      HPI      Mr. Iverson is a 61-year-old white male with multiple medical problems who was referred to the Physical Medicine Clinic for chronic right leg pain.  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  The patient reports that about 4 months ago, while mowing his grass, he started complaining acutely of pain in his right calf.  The pain was relieved by sitting down.  The pain has been off and on since.  He describes it as soreness in the right mid calf region.  It is again aggravated by access activity and mowing his grass.  It is better with sitting down.  Topical analgesic creams such as icy Hot also help some extent.  His maximum pain is 3-4/10 and minimum 0/10.  Today it is 0/10.  The patient denies any associated back pain or knee pain.  He denies any discoloration or coldsensation of his foot.  He denies any swelling in his leg.    As noted above, he is currently taking p.r.n. icy Hot cream to his right calf when his pain acts up.        Past Medical History:   Diagnosis Date    Hypertension     Lung cancer     NSCLC    Lung mass     left lung        Review of patient's allergies indicates:  No Known Allergies     Review of Systems   Constitutional: Positive for fatigue. Negative for chills and fever.   Eyes: Negative for visual disturbance.   Respiratory: Negative for shortness of breath.    Cardiovascular: Negative for chest pain.   Gastrointestinal: Negative for nausea and vomiting.   Genitourinary: Negative for difficulty urinating.   Musculoskeletal: Negative for arthralgias, back pain, gait problem and neck pain.   Neurological: Negative for dizziness, weakness and headaches.    Psychiatric/Behavioral: Negative for behavioral problems and sleep disturbance.             Objective:      Physical Exam  Vitals reviewed.   Constitutional:       General: He is not in acute distress.     Appearance: Normal appearance. He is well-developed.   HENT:      Head: Normocephalic and atraumatic.   Eyes:      Extraocular Movements: Extraocular movements intact.   Musculoskeletal:      Cervical back: Normal range of motion.      Comments: BUE:  ROM:   RUE: full.   LUE: full.  Strength:    RUE: 5/5 at shoulder abduction, 5 elbow flexion, 5 elbow extension, 5 hand .   LUE: 5/5 at shoulder abduction, 5 elbow flexion, 5 elbow extension, 5 hand .  Sensation to pinprick:   RUE: intact.   LUE: intact.  DTR:    RUE: +1 biceps, +1 triceps.   LUE:  +1 biceps, +1 triceps.  [place]:   RUE: -ve.   LUE: -ve.    BLE:  ROM:   RLE: full.   LLE: full.  Knee crepitus:   RLE: -ve.   LLE: +ve.   Strength:    RLE: 5/5 at hip flexion, 5 knee extension, 5 ankle DF, 5 PF, 5 EHL.   LLE: 5/5 at hip flexion, 5 knee extension, 5 ankle DF, 5 PF, 5 EHL.  Sensation to pinprick:     RLE: intact.      LLE: intact.   DTR:     RLE: +2 knee, +1 ankle.    LLE: +2 knee, +1 ankle.  Clonus:    Rt ankle: -ve.    Lt ankle: -ve.  SLR:      RLE: -ve at 70 degrees.      LLE: -ve at 70 degrees.   -ve tenderness over lumbar spine.    Rt leg:  -ve discoloration  +ve varicose veins.  -ve leg swelling.  -ve tight muscles.  +ve mild calf tenderness.  -ve Homans' test.      Poorly palpable pulse in Dorsalis pedis and post tibialis bilaterally      Gait: WNL     Skin:     General: Skin is warm.   Neurological:      Mental Status: He is alert.   Psychiatric:         Mood and Affect: Mood normal.         Behavior: Behavior normal.         Assessment:       1. Chronic pain of right lower extremity        Plan:         The patient has chronic right leg pain.  Not clearly related to vascular claudications.  No associated back pain to suggest  radiculopathy.  No signs of compartment syndrome.  - We will order: X-Ray Tibia Fibula 2 View Right; Future.  - We will also order: Ankle Brachial Indices (LUIS FELIPE); Future (due to poorly palpable pulses and history of nicotine abuse).  - He was asked to get an appointment with dermatology about a mole in his right calf area.  - Follow up in about 4 months (around 7/10/2022).        This was a 45 minute visit, 50% of which was spent educating the patient about the diagnosis and the treatment plan.    This note was partly generated with [employer] voice recognition software. I apologize for any possible typographical errors.

## 2022-03-11 ENCOUNTER — OFFICE VISIT (OUTPATIENT)
Dept: HEMATOLOGY/ONCOLOGY | Facility: CLINIC | Age: 62
End: 2022-03-11
Payer: MEDICARE

## 2022-03-11 ENCOUNTER — HOSPITAL ENCOUNTER (OUTPATIENT)
Dept: CARDIOLOGY | Facility: HOSPITAL | Age: 62
Discharge: HOME | End: 2022-03-11
Attending: PHYSICAL MEDICINE & REHABILITATION
Payer: MEDICARE

## 2022-03-11 VITALS
SYSTOLIC BLOOD PRESSURE: 160 MMHG | DIASTOLIC BLOOD PRESSURE: 78 MMHG | WEIGHT: 209.44 LBS | OXYGEN SATURATION: 98 % | BODY MASS INDEX: 29.98 KG/M2 | HEIGHT: 70 IN | HEART RATE: 87 BPM | TEMPERATURE: 98 F

## 2022-03-11 DIAGNOSIS — G89.29 CHRONIC PAIN OF RIGHT LOWER EXTREMITY: ICD-10-CM

## 2022-03-11 DIAGNOSIS — D53.9 MACROCYTIC ANEMIA: ICD-10-CM

## 2022-03-11 DIAGNOSIS — M79.604 CHRONIC PAIN OF RIGHT LOWER EXTREMITY: ICD-10-CM

## 2022-03-11 DIAGNOSIS — C34.02 LUNG CANCER, MAIN BRONCHUS, LEFT: Primary | ICD-10-CM

## 2022-03-11 DIAGNOSIS — C79.51 SECONDARY MALIGNANT NEOPLASM OF BONE: ICD-10-CM

## 2022-03-11 DIAGNOSIS — I50.21 ACUTE SYSTOLIC HEART FAILURE: ICD-10-CM

## 2022-03-11 DIAGNOSIS — I21.4 NSTEMI (NON-ST ELEVATED MYOCARDIAL INFARCTION): ICD-10-CM

## 2022-03-11 PROBLEM — R41.0 DELIRIUM: Status: RESOLVED | Noted: 2022-03-02 | Resolved: 2022-03-11

## 2022-03-11 PROBLEM — D69.6 THROMBOCYTOPENIA, UNSPECIFIED: Status: RESOLVED | Noted: 2021-11-10 | Resolved: 2022-03-11

## 2022-03-11 LAB
LEFT ABI: 0.92
LEFT ARM BP: 160 MMHG
LEFT DORSALIS PEDIS: 150 MMHG
LEFT POSTERIOR TIBIAL: 109 MMHG
RIGHT ABI: 0.29
RIGHT ARM BP: 163 MMHG
RIGHT DORSALIS PEDIS: 48 MMHG
RIGHT POSTERIOR TIBIAL: 40 MMHG

## 2022-03-11 PROCEDURE — 93922 UPR/L XTREMITY ART 2 LEVELS: CPT

## 2022-03-11 PROCEDURE — 99999 PR PBB SHADOW E&M-EST. PATIENT-LVL III: ICD-10-PCS | Mod: PBBFAC,,, | Performed by: STUDENT IN AN ORGANIZED HEALTH CARE EDUCATION/TRAINING PROGRAM

## 2022-03-11 PROCEDURE — 99999 PR PBB SHADOW E&M-EST. PATIENT-LVL III: CPT | Mod: PBBFAC,,, | Performed by: STUDENT IN AN ORGANIZED HEALTH CARE EDUCATION/TRAINING PROGRAM

## 2022-03-11 PROCEDURE — 99215 OFFICE O/P EST HI 40 MIN: CPT | Mod: S$PBB,,, | Performed by: STUDENT IN AN ORGANIZED HEALTH CARE EDUCATION/TRAINING PROGRAM

## 2022-03-11 PROCEDURE — 99213 OFFICE O/P EST LOW 20 MIN: CPT | Mod: PBBFAC | Performed by: STUDENT IN AN ORGANIZED HEALTH CARE EDUCATION/TRAINING PROGRAM

## 2022-03-11 PROCEDURE — 99215 PR OFFICE/OUTPT VISIT, EST, LEVL V, 40-54 MIN: ICD-10-PCS | Mod: S$PBB,,, | Performed by: STUDENT IN AN ORGANIZED HEALTH CARE EDUCATION/TRAINING PROGRAM

## 2022-03-11 PROCEDURE — 93922 ANKLE BRACHIAL INDICES (ABI): ICD-10-PCS | Mod: 26,,, | Performed by: INTERNAL MEDICINE

## 2022-03-11 PROCEDURE — 93922 UPR/L XTREMITY ART 2 LEVELS: CPT | Mod: 26,,, | Performed by: INTERNAL MEDICINE

## 2022-03-11 NOTE — Clinical Note
When to follow up: 4/29 Labs needed: port draw labs prior to chemo CBC and Comprehensive Metabolic Panel  Chemotherapy Regimen:   3/22/2022      gemcitabine (GEMZAR) 2,210 mg in sodium chloride 0.9% 250 mL chemo infusion 4/5/2022      gemcitabine (GEMZAR) 2,210 mg in sodium chloride 0.9% 250 mL chemo infusion 4/19/2022      gemcitabine (GEMZAR) 2,210 mg in sodium chloride 0.9% 250 mL chemo infusion

## 2022-03-11 NOTE — ASSESSMENT & PLAN NOTE
S/p right chest port placement.  Recovered from recent hospitalization and is near baseline.  -port draw labs and set up chemo to start in 2 weeks  -follow up with me in 1 month

## 2022-03-11 NOTE — ASSESSMENT & PLAN NOTE
Hospitalized 2/22/22-3/5/22 with acute respiratory failure, found to have new systolic heart failure and moderate right sided pleural effusion.  C with non-obstructive CAD.  Pleural effusion was able to be diuresed off. Physical exam without fluid overload.  -continue cardiac meds and follow up with cardiology

## 2022-03-11 NOTE — PROGRESS NOTES
"  PATIENT: Kashif Iverson  MRN: [id number]  DATE: 3/11/2022      Diagnosis:   1. Lung cancer, main bronchus, left    2. Secondary malignant neoplasm of bone    3. Macrocytic anemia    4. NSTEMI (non-ST elevated myocardial infarction)    5. Acute systolic heart failure        Chief Complaint: Lung Cancer      Oncologic History:    Oncologic History 1. 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 Palliative chemoradiation with carboplatin and paclitaxel; anaphylactic reaction to paclitaxel  2. Pembrolizumab  3. Pembrolizumab+ramucirumab (LungMAP trial)  4. Gemcitabine    Pathology Squamous cell carcinoma, no actionable mutations, PD-L1 5%        Subjective:    Interval History: Mr. Iverson returns for follow up.     8/30/2019 underwent bronchoscopy that showed "A partially obstructing (about 80% obstructed) mass was found in the middle portion in the left mainstem bronchus. The mass was large and bloody, circumferential, endobronchial, friable and necrotic. 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  No actionable mutations and PD-L1 5%.  9/19/21 staging PET CT showed "Hypermetabolic left lung mass concerning for primary pulmonary malignancy with metastatic disease involving the right 6th and 9th ribs as well as mediastinal and left supraclavicular lymph nodes."  Stage IV squamous cell carcinoma of the lung at diagnosis.    10/8/2019-10/21/2019 he received palliative chemoradiation for rib pain with carboplatin and paclitaxel.  He received 3 cycles of carboplatin and paclitaxel.  He developed anaphylactic reaction to paclitaxel.  The chest was treated with AP:PA fields and the right 9th rib was treated with anterior and posterior oblique fields at 300 cGy per fraction to 3000 cGy.   Lt chest 6X 300 10 / 10 3,000   Rt 9th rib 6X 300 10 / 10 3,000     10/31/2019-9/10/2020 he received pembrolizumab (completed 15 " cycles, last dose 8/21/2020)  9/10/2020 PET CT showed progression of disease.  He was then referred to Dr. Key for evaluation for clinical trials.  10/20/2020 he underwent repeat biopsy for LUNG MAP trial and was randomized to Ramucirumab + pembrolizumab.    11/17/2020 started ramucirumab+pembrolizumab.  Completed 4 cycles and then 2/10/2021 scans showed progression.    2/24/2021 he was subsequent started on gemcitabine with Dr. Cruz.  His care was transferred to Dr. Romo who continued his current regimen and now is currently under my care.   --  2/22/22-3/5/22 hospitalized for acute respiratory failure requiring intubation. Evaluated by cardiology for new systolic heart failure; LHC showed non-obstructive CAD.  Right moderate pleural effusion resolved after diuresis.  He was able to be discharged home.    He comes in today at near baseline performance status.  No chest pain or dyspnea.  No other symptoms to report at this time.    Wife accompanies him at this visit.    Past Medical History:   Past Medical History:   Diagnosis Date    Hypertension     Lung cancer     NSCLC    Lung mass     left lung       Past Surgical HIstory:   Past Surgical History:   Procedure Laterality Date    BRONCHOSCOPY N/A 8/30/2019    Procedure: Bronchoscopy;  Surgeon: [facility] Diagnostic Provider;  Location: [facility] OR [address] [place] [address];  Service: Anesthesiology;  Laterality: N/A;    CORONARY ANGIOGRAPHY N/A 3/4/2022    Procedure: ANGIOGRAM, CORONARY ARTERY;  Surgeon: Robson Green MD;  Location: [facility] CATH LAB;  Service: Cardiology;  Laterality: N/A;       Family History:   Family History   Problem Relation Age of Onset    Diabetes Mother     Heart defect Mother     Cancer Father     Cancer Sister     No Known Problems Son        Social History:  reports that he quit smoking about 2 years ago. His smoking use included cigarettes. He has a 25.00 pack-year smoking history. He has never used smokeless tobacco. He reports current alcohol  use. He reports that he does not use drugs.    Allergies:  Review of patient's allergies indicates:  No Known Allergies    Medications:  Current Outpatient Medications   Medication Sig Dispense Refill    ascorbic acid-multivit-min (VITAMIN C ENERGY BOOSTER) 1,000 mg PwEP Take by mouth. Patient takes 3,000 mg per day      aspirin 81 MG Chew Take 1 tablet (81 mg total) by mouth once daily. 30 tablet 11    atenoloL (TENORMIN) 50 MG tablet Take 1 tablet (50 mg total) by mouth once daily. 30 tablet 11    atorvastatin (LIPITOR) 80 MG tablet Take 1 tablet (80 mg total) by mouth once daily. 90 tablet 3    clopidogreL (PLAVIX) 75 mg tablet Take 1 tablet (75 mg total) by mouth once daily. 30 tablet 11    furosemide (LASIX) 40 MG tablet Take 1 tablet (40 mg total) by mouth once daily. 30 tablet 11    LIDOcaine-prilocaine (EMLA) cream Apply generously to port site 30-60 min prior to chemo and then cover with saran wrap. 30 g 2    ondansetron (ZOFRAN) 8 MG tablet Take 1 tablet (8 mg total) by mouth 4 (four) times daily as needed for Nausea. 60 tablet 2     No current facility-administered medications for this visit.       Review of Systems   Constitutional: Negative for activity change, appetite change, chills, diaphoresis, fatigue, fever and unexpected weight change.   HENT: Negative for nosebleeds and trouble swallowing.    Eyes: Negative for visual disturbance.   Respiratory: Negative for cough, chest tightness, shortness of breath and wheezing.    Cardiovascular: Negative for chest pain and leg swelling.   Gastrointestinal: Negative for abdominal distention, abdominal pain, blood in stool, constipation, diarrhea, nausea and vomiting.   Endocrine: Negative for cold intolerance and heat intolerance.   Genitourinary: Negative for difficulty urinating and dysuria.   Musculoskeletal: Negative for arthralgias, back pain and myalgias.   Skin: Negative for color change.   Neurological: Negative for dizziness, weakness,  "light-headedness, numbness and headaches.   Hematological: Negative for adenopathy. Bruises/bleeds easily.   Psychiatric/Behavioral: Negative for confusion.       ECOG Performance Status:     ECOG SCORE    1 - Restricted in strenuous activity-ambulatory and able to carry out work of a light nature         Objective:      Vitals:   Vitals:    03/11/22 1336   BP: (!) 160/78   BP Location: Left arm   Patient Position: Sitting   Pulse: 87   Temp: 98.3 °F (36.8 °C)   TempSrc: Oral   SpO2: 98%   Weight: 95 kg (209 lb 7 oz)   Height: 5' 10" (1.778 m)     BMI: Body mass index is 30.05 kg/m².    Physical Exam  Constitutional:       General: He is not in acute distress.     Appearance: Normal appearance. He is not ill-appearing.   HENT:      Head: Normocephalic and atraumatic.      Mouth/Throat:      Pharynx: No oropharyngeal exudate or posterior oropharyngeal erythema.   Eyes:      General: No scleral icterus.     Extraocular Movements: Extraocular movements intact.      Conjunctiva/sclera: Conjunctivae normal.      Pupils: Pupils are equal, round, and reactive to light.   Cardiovascular:      Rate and Rhythm: Normal rate and regular rhythm.      Heart sounds: No murmur heard.    No friction rub. No gallop.      Comments: Right chest wall port c/d/i  Pulmonary:      Effort: Pulmonary effort is normal. No respiratory distress.      Breath sounds: No stridor. No wheezing, rhonchi or rales.   Abdominal:      General: Bowel sounds are normal. There is no distension.      Palpations: Abdomen is soft. There is no mass.      Tenderness: There is no abdominal tenderness. There is no guarding or rebound.   Musculoskeletal:         General: No swelling or tenderness (negative homans). Normal range of motion.      Cervical back: Normal range of motion and neck supple.      Right lower leg: No edema.      Left lower leg: No edema.   Skin:     General: Skin is warm and dry.      Findings: Bruising present.   Neurological:      General: " No focal deficit present.      Mental Status: He is alert.         Laboratory Data:   Recent Results (from the past 168 hour(s))   Phosphorus    Collection Time: 03/05/22  2:22 AM   Result Value Ref Range    Phosphorus 3.1 2.7 - 4.5 mg/dL      Latest Reference Range & Units 03/04/22 04:24   WBC 3.90 - 12.70 K/uL 9.65   RBC 4.60 - 6.20 M/uL 3.72 (L)   Hemoglobin 14.0 - 18.0 g/dL 11.8 (L)   Hematocrit 40.0 - 54.0 % 37.1 (L)   MCV 82 - 98 fL 100 (H)   MCH 27.0 - 31.0 pg 31.7 (H)   MCHC 32.0 - 36.0 g/dL 31.8 (L)   RDW 11.5 - 14.5 % 16.0 (H)   Platelets 150 - 450 K/uL 329   MPV 9.2 - 12.9 fL 10.3   Protime 9.0 - 12.5 sec 11.7   INR 0.8 - 1.2  1.1 [1]   APTT 21.0 - 32.0 sec 30.2 [2]   Sodium 136 - 145 mmol/L 139   Potassium 3.5 - 5.1 mmol/L 3.5   Chloride 95 - 110 mmol/L 103   CO2 23 - 29 mmol/L 27   Anion Gap 8 - 16 mmol/L 9   BUN 8 - 23 mg/dL 22   Creatinine 0.5 - 1.4 mg/dL 1.0   EGFR if non African American >60 mL/min/1.73 m^2 >60 [3]   EGFR if African American >60 mL/min/1.73 m^2 >60   Glucose 70 - 110 mg/dL 103   Calcium 8.7 - 10.5 mg/dL 8.5 (L)       Imaging:     X-Ray Chest 1 View    Result Date: 3/1/2022  EXAMINATION: XR CHEST 1 VIEW CLINICAL HISTORY: PICC placement; TECHNIQUE: Single frontal view of the chest was performed. COMPARISON: 03/01/2022 FINDINGS: Right PICC catheter has been placed, the tip is difficult to visualize given overlying right chest wall port catheter.  The tip projects at least to the level of the proximal SVC.  There is no pneumothorax or significant interval detrimental change in the cardiopulmonary status since the previous exam.     As above Electronically signed by: Jonathan Mclaughlin MD Date:    03/01/2022 Time:    19:23    X-Ray Chest 1 View    Result Date: 2/23/2022  EXAMINATION: XR CHEST 1 VIEW CLINICAL HISTORY: eval ETT/line placement/pna/ptx; TECHNIQUE: Single frontal view of the chest was performed. COMPARISON: 02/22/2022 chest and and CTA chest FINDINGS: Incomplete inspiration,  apical lordotic positioning, large body size degrades exam.  Tube support devices stable positioning with right IJ catheter tip remaining in central right atrial chamber.  Cardia margins remain obscured by overlying infiltrate particularly left perihilar and lower lung zone in left cardiac margin.  The moderate sub pulmonic pleural effusion seen better advantage on CT obscures right mid lower lung zone by compression atelectasis.  Pulmonary vascular tree less prominent.  Small left pleural effusion particularly lateral chest wall, atelectasis with corresponding radiating star like infiltrate from left hilar region and superimposed scarring volume loss left upper lobe     Clearing of pulmonary vascular congestion.  Pleural effusions and corresponding compression atelectasis on right and atelectasis scarring left perihilar and smaller pleural effusion on left stable. Electronically signed by: Cristofer Botello MD Date:    02/23/2022 Time:    10:58    X-Ray Chest 1 View    Result Date: 2/22/2022  EXAMINATION: XR CHEST 1 VIEW CLINICAL HISTORY: picc; TECHNIQUE: Single frontal view of the chest was performed. COMPARISON: 20:04. FINDINGS: Right-sided PICC line is visualized with distal tip in the subclavian region.  Previously visualized left-sided PICC line has been removed.  No significant change in cardiopulmonary status from earlier exam.  ET tube and enteric tube are in similar positions.  No pneumothorax.     As above. Electronically signed by: Abelino Finley MD Date:    02/22/2022 Time:    20:46    X-Ray Chest 1 View    Result Date: 2/22/2022  EXAMINATION: XR CHEST 1 VIEW CLINICAL HISTORY: picc line; TECHNIQUE: Single frontal view of the chest was performed. COMPARISON: 14:21. FINDINGS: ET tube is visualized with distal tip approximately 4 cm above the jonnathan.  Left-sided PICC line is visualized with distal tip projecting horizontally toward the right over the SVC.  No significant change in cardiopulmonary status  from earlier exam.  No pneumothorax.     As above. Electronically signed by: Abelino Finley MD Date:    02/22/2022 Time:    20:20    X-Ray Chest PA And Lateral    Result Date: 3/1/2022  EXAMINATION: XR CHEST PA AND LATERAL CLINICAL HISTORY: right pleural effusion follow up; TECHNIQUE: PA and lateral views of the chest were performed. COMPARISON: 02/23/2022 FINDINGS: Suspect LEFT suprahilar mass with volume loss and retraction.  The RIGHT lung is clear.  Old RIGHT-sided rib fracture RIGHT lower rib.  Port-A-Cath in place with tip at level of superior vena cava/RIGHT atrial junction.Pleural thickening identified LEFT costophrenic angle.No pneumothorax. The cardiac silhouette is unchanged. Bones are intact.     No interval detrimental change.  LEFT upper lobe/suprahilar mass with volume loss, stable. Electronically signed by: Davy Woodard MD Date:    03/01/2022 Time:    14:31    X-Ray Tibia Fibula 2 View Right    Result Date: 3/10/2022  EXAMINATION: XR TIBIA FIBULA 2 VIEW RIGHT CLINICAL HISTORY: Pain in right leg FINDINGS: Tib fib two views right: No fracture dislocation bone destruction seen.  No trauma seen.  There is DJD. Electronically signed by: Kameron Seaman MD Date:    03/10/2022 Time:    12:07    CTA Chest Non-Coronary (PE Study)    Result Date: 2/22/2022  EXAMINATION: CTA CHEST NON CORONARY CLINICAL HISTORY: Pulmonary embolism (PE) suspected, high prob; TECHNIQUE: Low dose axial images, sagittal and coronal reformations were obtained from the thoracic inlet to the lung bases following the IV administration of 100 mL of Omnipaque 350.  Contrast timing was optimized to evaluate the pulmonary arteries.  MIP images were performed. COMPARISON: CT chest abdomen and pelvis from 01/05/2022. FINDINGS: Examination was performed in a STAT inpatient setting is not to serve as official restaging exam. Right-sided chest port is visualized with distal tip in the SVC.  Aorta is non-aneurysmal.  The heart is normal in size  without pericardial effusion.  No intraluminal filling defects within the pulmonary arteries to suggest pulmonary thromboembolism.  Enteric tube extends throughout the esophagus into the stomach. ET tube is visualized with distal tip above the jonnathan.  Occlusion is seen of the left upper lobe bronchus.  Mild bronchiectasis is seen within the bilateral lower lobes.  Patient with history of left upper and lower lobe lesions.  Additional associated subsegmental areas of consolidation/atelectasis are seen within the left upper lobe and medial aspect of the left lower lobe.  Moderate right-sided pleural effusion is seen resulting in volume loss and compressive atelectasis within the right lower lobe.  Emphysematous changes are seen with upper lobe predominance. The visualized abdominal structures show no acute abnormalities.  No acute osseous abnormality identified.  Extrathoracic soft tissues are unremarkable.     1. No evidence of PE. 2. Moderate right pleural effusion. 3. Occlusion of the left upper lobe bronchus with known left upper and lower lobe lesions with additional associated subsegmental areas of consolidation/atelectasis within the left upper and lower lobes. 4. Additional findings as detailed above. Electronically signed by: Abelino Finley MD Date:    02/22/2022 Time:    19:13    MRI Brain W WO Contrast    Result Date: 3/2/2022  EXAMINATION: MRI BRAIN W WO CONTRAST CLINICAL HISTORY: Brain metastases suspected; TECHNIQUE: Multiplanar multisequence MR imaging of the brain was performed before and after the administration of 9 mL Gadavist intravenous contrast. COMPARISON: Most recent MRI dated 07/29/2021 FINDINGS: There is no evidence of hydrocephalus mass effect intracranial hemorrhage or acute infarct.  Gliotic changes in the right frontal and parietal lobes from prior insult/infarct is again noted.  Prior insult to the left frontal deep white matter is stable. After the intravenous administration of  contrast, no enhancing lesion to suggest metastatic disease is identified. Normal arterial flow voids are preserved at the skull base. The visualized sinuses and mastoid air cells are clear.     Stable appearance of the brain without acute intracranial process or enhancing lesion to suggest metastatic disease. Electronically signed by: Surjit Garrett Date:    03/02/2022 Time:    15:05    X-Ray Chest AP Portable    Result Date: 2/23/2022  EXAMINATION: XR CHEST AP PORTABLE February 23, 2022, 23:43 hours CLINICAL HISTORY: ET tube placement; TECHNIQUE: Single frontal view of the chest was performed. COMPARISON: Chest radiograph February 23, 2022, 10:16 hours FINDINGS: ET tube is noted, the distal tip above the level the jonnathan.  Enteric tube is noted, the distal tip extends subdiaphragmatic below the field of view.  Right-sided central venous catheter again noted.  Additional objects overlie the patient.  There is also a right-sided PICC line again noted. The patient is rotated, when accounting for difference in position, technique and depth of inspiration, the intrathoracic appearance is stable, persistent abnormal radiographic appearance.  There is no evidence for pneumothorax.  The osseous structures demonstrate chronic change.     Stable abnormal radiographic appearance when accounting for difference in position and technique. Electronically signed by: Jordin Sofia Date:    02/23/2022 Time:    23:55    X-Ray Chest AP Portable    Result Date: 2/22/2022  EXAMINATION: XR CHEST AP PORTABLE CLINICAL HISTORY: shortness of breath; TECHNIQUE: Single frontal view of the chest was performed. COMPARISON: Two thousand nineteen chest x-ray and CT scan chest 01/05/2022 FINDINGS: New bilateral infiltrates with partial consolidation right perihilar mid lower lung zone, obscuring of right lateral hemidiaphragm margin.  New less prominent infiltrates left perihilar mid lung zone as well as retrocardiac infrahilar left lung base  with additional pleural reaction left lateral CP angle and adjacent chest wall.  Heart normal size.  Right IJ venous port stable.  Interval insertion of NG tube and endotracheal tube with ET tube tip at T6 level satisfactory above level of jonnathan.  Pulmonary vascular tree appears more more prominent and obscured by adjacent infiltrates.     New bilateral lung infiltrates.  Inflammatory, infectious, CHF etiologies favored.  The possibility of recurrent neoplasm left hilum cannot be excluded. Consideration for follow-up CT chest with IV contrast suggested as well as clinical correlation. Epic abnormal flag. Electronically signed by: Cristofer Botello MD Date:    02/22/2022 Time:    14:59    Echo    Result Date: 2/23/2022  · The left ventricle is normal in size with concentric hypertrophy and moderately decreased systolic function. · The estimated ejection fraction is 35-40%. · Grade I left ventricular diastolic dysfunction. · Mild right ventricular enlargement with normal right ventricular systolic function. · Mild left atrial enlargement. · Mild right atrial enlargement. · Mild tricuspid regurgitation.      Cardiac catheterization    Result Date: 3/4/2022  · There was non-obstructive coronary artery disease.. · LVEDP 15      XR Non-Rad Performed NG/Gastric Tube Check    Result Date: 2/23/2022  EXAMINATION: XR NON-RADIOLOGIST PERFORMED NG/GASTRIC TUBE CHECK CLINICAL HISTORY: Ng placement; COMPARISON: 02/22/2022. FINDINGS: Enteric tube extends well below the diaphragm curled within the gastric fundal region.  Nonspecific bowel gas pattern seen.     OG tube in place. Electronically signed by: Abelino Finley MD Date:    02/23/2022 Time:    23:53    X-ray Abdomen for NG Tube Placement (Nursing should notify Radiology after placement)    Result Date: 2/22/2022  EXAMINATION: XR NON-RADIOLOGIST PERFORMED NG/GASTRIC TUBE CHECK CLINICAL HISTORY: OG tube placement; COMPARISON: 14:21. FINDINGS: Enteric tube extends well below  "the diaphragm into the stomach.  No significant change in cardiopulmonary status from earlier chest radiograph.     OG tube in place. Electronically signed by: Abelino Finley MD Date:    02/22/2022 Time:    18:15      Assessment:       1. Lung cancer, main bronchus, left    2. Secondary malignant neoplasm of bone    3. Macrocytic anemia    4. NSTEMI (non-ST elevated myocardial infarction)    5. Acute systolic heart failure           Plan:       Problem List Items Addressed This Visit        Cardiac/Vascular    NSTEMI (non-ST elevated myocardial infarction)    Acute systolic heart failure    Current Assessment & Plan     Hospitalized 2/22/22-3/5/22 with acute respiratory failure, found to have new systolic heart failure and moderate right sided pleural effusion.  [facility] with non-obstructive CAD.  Pleural effusion was able to be diuresed off. Physical exam without fluid overload.  -continue cardiac meds and follow up with cardiology              Oncology    Macrocytic anemia    Current Assessment & Plan     Stable, hemoglobin >10  -monitor cbc with each cycle           Lung cancer, main bronchus, left - Primary    Overview     8/30/2019 underwent bronchoscopy that showed "A partially obstructing (about 80% obstructed) mass was found in the middle portion in the left mainstem bronchus. The mass was large and bloody, circumferential, endobronchial, friable and necrotic. 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  No actionable mutations and PD-L1 5%.  9/19/21 staging PET CT showed "Hypermetabolic left lung mass concerning for primary pulmonary malignancy with metastatic disease involving the right 6th and 9th ribs as well as mediastinal and left supraclavicular lymph nodes."  Stage IV squamous cell carcinoma of the lung at diagnosis.    10/8/2019-10/21/2019 he received palliative chemoradiation for rib pain with carboplatin and paclitaxel.  He " received 3 cycles of carboplatin and paclitaxel.  He developed anaphylactic reaction to paclitaxel.  The chest was treated with AP:PA fields and the right 9th rib was treated with anterior and posterior oblique fields at 300 cGy per fraction to 3000 cGy.   Lt chest 6X 300 10 / 10 3,000   Rt 9th rib 6X 300 10 / 10 3,000     10/31/2019-9/10/2020 he received pembrolizumab (completed 15 cycles, last dose 8/21/2020)  9/10/2020 PET CT showed progression of disease.  He was then referred to Dr. Key for evaluation for clinical trials.  10/20/2020 he underwent repeat biopsy for LUNG MAP trial and was randomized to Ramucirumab + pembrolizumab.    11/17/2020 started ramucirumab+pembrolizumab.  Completed 4 cycles and then 2/10/2021 scans showed progression.    2/24/2021 he was subsequent started on gemcitabine with Dr. Cruz.           Current Assessment & Plan     S/p right chest port placement.  Recovered from recent hospitalization and is near baseline.  -port draw labs and set up chemo to start in 2 weeks  -follow up with me in 1 month           Secondary malignant neoplasm of bone          No orders of the defined types were placed in this encounter.          Mario Hathaway MD  Hematology Oncology

## 2022-03-14 ENCOUNTER — OFFICE VISIT (OUTPATIENT)
Dept: FAMILY MEDICINE | Facility: CLINIC | Age: 62
End: 2022-03-14
Payer: MEDICARE

## 2022-03-14 VITALS
TEMPERATURE: 98 F | BODY MASS INDEX: 30.05 KG/M2 | WEIGHT: 209.88 LBS | SYSTOLIC BLOOD PRESSURE: 162 MMHG | HEART RATE: 98 BPM | DIASTOLIC BLOOD PRESSURE: 84 MMHG | HEIGHT: 70 IN | RESPIRATION RATE: 16 BRPM | OXYGEN SATURATION: 97 %

## 2022-03-14 DIAGNOSIS — I10 PRIMARY HYPERTENSION: ICD-10-CM

## 2022-03-14 DIAGNOSIS — J96.02 ACUTE RESPIRATORY FAILURE WITH HYPOXIA AND HYPERCAPNIA: ICD-10-CM

## 2022-03-14 DIAGNOSIS — I47.19 MULTIFOCAL ATRIAL TACHYCARDIA: ICD-10-CM

## 2022-03-14 DIAGNOSIS — I25.10 CORONARY ARTERY DISEASE INVOLVING NATIVE CORONARY ARTERY OF NATIVE HEART WITHOUT ANGINA PECTORIS: ICD-10-CM

## 2022-03-14 DIAGNOSIS — C34.02 LUNG CANCER, MAIN BRONCHUS, LEFT: ICD-10-CM

## 2022-03-14 DIAGNOSIS — J96.01 ACUTE RESPIRATORY FAILURE WITH HYPOXIA AND HYPERCAPNIA: ICD-10-CM

## 2022-03-14 DIAGNOSIS — I50.21 ACUTE SYSTOLIC HEART FAILURE: ICD-10-CM

## 2022-03-14 DIAGNOSIS — Z09 HOSPITAL DISCHARGE FOLLOW-UP: Primary | ICD-10-CM

## 2022-03-14 DIAGNOSIS — I73.9 PAD (PERIPHERAL ARTERY DISEASE): ICD-10-CM

## 2022-03-14 PROBLEM — E87.20 LACTIC ACIDOSIS: Status: RESOLVED | Noted: 2022-02-22 | Resolved: 2022-03-14

## 2022-03-14 PROBLEM — M79.604 RIGHT LEG PAIN: Status: RESOLVED | Noted: 2021-10-16 | Resolved: 2022-03-14

## 2022-03-14 PROBLEM — E87.29 HIGH ANION GAP METABOLIC ACIDOSIS: Status: RESOLVED | Noted: 2022-02-22 | Resolved: 2022-03-14

## 2022-03-14 PROBLEM — M79.89 SWELLING OF RIGHT UPPER EXTREMITY: Status: RESOLVED | Noted: 2021-05-04 | Resolved: 2022-03-14

## 2022-03-14 PROBLEM — E83.39 HYPERPHOSPHATEMIA: Status: RESOLVED | Noted: 2022-02-22 | Resolved: 2022-03-14

## 2022-03-14 PROBLEM — R73.9 HYPERGLYCEMIA: Status: RESOLVED | Noted: 2022-02-22 | Resolved: 2022-03-14

## 2022-03-14 PROBLEM — R57.0 CARDIOGENIC SHOCK: Status: RESOLVED | Noted: 2022-02-24 | Resolved: 2022-03-14

## 2022-03-14 PROBLEM — E87.6 HYPOKALEMIA: Status: RESOLVED | Noted: 2022-02-22 | Resolved: 2022-03-14

## 2022-03-14 PROBLEM — I25.2 HISTORY OF NON-ST ELEVATION MYOCARDIAL INFARCTION (NSTEMI): Status: ACTIVE | Noted: 2022-02-22

## 2022-03-14 PROCEDURE — 99999 PR PBB SHADOW E&M-EST. PATIENT-LVL III: CPT | Mod: PBBFAC,,, | Performed by: NURSE PRACTITIONER

## 2022-03-14 PROCEDURE — 99213 OFFICE O/P EST LOW 20 MIN: CPT | Mod: PBBFAC,PO | Performed by: NURSE PRACTITIONER

## 2022-03-14 PROCEDURE — 99214 PR OFFICE/OUTPT VISIT, EST, LEVL IV, 30-39 MIN: ICD-10-PCS | Mod: S$PBB,,, | Performed by: NURSE PRACTITIONER

## 2022-03-14 PROCEDURE — 99214 OFFICE O/P EST MOD 30 MIN: CPT | Mod: S$PBB,,, | Performed by: NURSE PRACTITIONER

## 2022-03-14 PROCEDURE — 99999 PR PBB SHADOW E&M-EST. PATIENT-LVL III: ICD-10-PCS | Mod: PBBFAC,,, | Performed by: NURSE PRACTITIONER

## 2022-03-14 NOTE — PROGRESS NOTES
"Subjective:      Patient ID: Kashif Iverson is a 61 y.o. male.  New to me but seen previously in clinic by a fellow provider. Pt presents to clinic for hospital f/u, see course below. Pt reports since hospital d/c, breathing has improved and denies any acute complaints today. Has metastatic lung cancer, undergoing chemotherapy, under the care of , oncology. Was dx with CHF while in hospital, also had NSTEMI, LVHC with nonobstructive CAD, denies chest pain, dyspnea or swelling.          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 Patient is currently intubated and unable to provide history. His wife, who is at bedside, states he was in his normal state up until today. States he had been eating and drinking just fine but did have to hide his "coke" which he drinks in excess. Apparently had "coke" again this AM as he felt that would help his breathing. He is currently on palliative chemotherapy with Gemcitabine, last dose given 2/15/2022 (about a week ago). Is no longer smoking.   In the ED, patient had progressive respiratory distress. Became diaphoretic and confused. Was also pulling oxygen mask. Patient was therefore intubated. Labwork significant for lactic acidosis of 8.7, hyperglycemia, HAGMA, hypokalemia, mild hyponatremia, hyperphosphatemia, BNP 700s, hyaline casts in UA and troponin 0.9. No ST segment elevation or depression. Cardiology and pulmonology consulted. Given fluids, empiric abx and admitted to ICU.   Hospital Course:   Mr Iverson presented with acute respiratory failure with hypercapnia/hypoxia. Intubated in the ED. Started on broad spectrum antibiotics. Blood and resp cultures obtained. No pneumothorax, large consolidation or pulmonary embolism but with moderate right " sided pleural effusion and known left main bronchial cancerous mass. Given fluids. Lactic acidosis resolved. Questionable diabetic ketoacidosis given hyperglycemia and high anion gap metabolic acidosis. Placed on insulin infusion. Also started on full dose anticoagulation, double antiplatelet therapy and statin for suspected NSTEMI. Glucose normalized and HAGMA resolved. Insulin discontinued. Fluids stopped. HgbA1c < 6% which is not consistent with diabetes. Cardiology and pulmonology consulted. Echocardiogram showed LVEF 35%-40% with grade 1 diastolic dysfunction. Furosemide IV given. Extubated to NC on 2/23. Overnight patient became hyperactive and encephalopathic. Experienced respiratory failure. Re-intubated. Diuresed. Extubated on 2/25 with precedex in place to reduce anxiety/paranoia post extubation. This worked well and remained stable on low flow NC. Plan for LHC. PT/OT consulted for dispo. He was transferred to the floor. Family having long discussion about having heart cath, discussed with Oncologist and agreed to move forward. He had intermittent delirium, MRI Brain did not show any new lesions or strokes. This had resolved prior to discharge. LHC noted non-obstructive CAD. No intervention. Patient had progressed and required no therapy at time of discharge. Discussed plan with wife and patient. He was discharged home in stable condition to follow up with Oncology, PCP and Cardiology.       Review of Systems   Constitutional: Positive for fatigue. Negative for activity change, appetite change, fever and unexpected weight change.   HENT: Negative for nasal congestion, ear pain, postnasal drip, rhinorrhea, sneezing, sore throat and voice change.    Eyes: Negative for pain and visual disturbance.   Respiratory: Negative for cough, chest tightness and shortness of breath.    Cardiovascular: Negative for chest pain, palpitations and leg swelling.   Gastrointestinal: Negative for abdominal pain, constipation,  "diarrhea, nausea and vomiting.   Endocrine: Negative.    Genitourinary: Negative for difficulty urinating.   Musculoskeletal: Negative for arthralgias, gait problem and myalgias.   Integumentary:  Negative for rash.   Allergic/Immunologic: Negative.    Neurological: Negative for speech difficulty and headaches.   Hematological: Negative.    Psychiatric/Behavioral: Negative.    All other systems reviewed and are negative.        Objective:     Vitals:    03/14/22 1104   BP: (!) 162/84   BP Location: Right arm   Patient Position: Sitting   Pulse: 98   Resp: 16   Temp: 98.2 °F (36.8 °C)   TempSrc: Oral   SpO2: 97%   Weight: 95.2 kg (209 lb 14.1 oz)   Height: 5' 10" (1.778 m)     Physical Exam  Vitals and nursing note reviewed.   Constitutional:       General: He is not in acute distress.     Appearance: Normal appearance. He is well-developed, well-groomed and normal weight. He is ill-appearing. He is not toxic-appearing or diaphoretic.   HENT:      Head: Normocephalic and atraumatic.      Right Ear: External ear normal.      Left Ear: External ear normal.      Nose: Nose normal.      Mouth/Throat:      Lips: Pink.      Mouth: Mucous membranes are moist.   Eyes:      General: Lids are normal. Vision grossly intact. Gaze aligned appropriately.      Extraocular Movements: Extraocular movements intact.      Conjunctiva/sclera: Conjunctivae normal.      Pupils: Pupils are equal, round, and reactive to light.   Neck:      Trachea: Phonation normal.   Cardiovascular:      Rate and Rhythm: Normal rate and regular rhythm.      Heart sounds: Normal heart sounds.   Pulmonary:      Effort: Pulmonary effort is normal. No accessory muscle usage or respiratory distress.      Breath sounds: Normal breath sounds and air entry. No decreased breath sounds, wheezing, rhonchi or rales.   Abdominal:      General: Abdomen is flat. Bowel sounds are normal. There is no distension.      Palpations: Abdomen is soft.      Tenderness: There is no " abdominal tenderness.   Musculoskeletal:      Cervical back: Neck supple.      Right lower leg: No edema.      Left lower leg: No edema.   Skin:     General: Skin is warm and dry.      Findings: No rash.   Neurological:      General: No focal deficit present.      Mental Status: He is alert and oriented to person, place, and time. Mental status is at baseline.      Cranial Nerves: No cranial nerve deficit.      Sensory: No sensory deficit.      Motor: Motor function is intact.      Coordination: Coordination is intact.      Gait: Gait is intact.   Psychiatric:         Attention and Perception: Attention and perception normal.         Mood and Affect: Mood and affect normal.         Speech: Speech normal.         Behavior: Behavior normal. Behavior is cooperative.         Thought Content: Thought content normal.         Cognition and Memory: Cognition and memory normal.         Judgment: Judgment normal.       Assessment and Plan:     1. Hospital discharge follow-up  Improving without acute complaint today    2. Acute systolic heart failure  Improved with diuresis, on lasix at home, no edema or acute distress  We discussed about leg edema  And  weight gain issues with CHF.  I educated patient to call  If he  Gains 5 lbs and more ,or legs start swelling or worsening shortness of breath , or inability to lie down.  If breathing is worse go to emergency Room    3. Acute respiratory failure with hypoxia and hypercapnia  Recommendations: improving, oxygen stats 97% on RA in clinic today, no signs of distress.  The patient was also instructed to call IMMEDIATELY (i.e., day or night) if any cardiopulmonary symptoms occur, especially chest pain, shortness of breath, dyspnea on exertion, paroxysmal nocturnal dyspnea, or orthopnea, and these were explained.    4. Primary hypertension  Elevated today, cardiovascularly intact, decrease sodium and f/u with cardiology as scheduled     5. PAD (peripheral artery  disease)  Asymptomatic at rest, no acute ulcerations, anticoagulated on plavix, has f/u with cards for further management     6. Multifocal atrial tachycardia  Evident in hospital, not evident today, Avoid alcohol, Avoid caffeine, Avoid cocaine, Avoid OTC decongestants/stimulants, Discontinue smoking, Follow up with cards as scheduled    7. Coronary artery disease involving native coronary artery of native heart without angina pectoris  Asymptomatic today, recent NSTEMI, on statin, anticoagulated on plavix, f/u with cards as scheduled    8. Lung cancer, main bronchus, left  Chemotherapy plan per oncology              FABIENNE Lawler, FNP-C  Family/Internal Medicine  [facility]

## 2022-03-17 ENCOUNTER — OFFICE VISIT (OUTPATIENT)
Dept: CARDIOLOGY | Facility: CLINIC | Age: 62
End: 2022-03-17
Payer: MEDICARE

## 2022-03-17 VITALS
DIASTOLIC BLOOD PRESSURE: 92 MMHG | HEART RATE: 80 BPM | SYSTOLIC BLOOD PRESSURE: 160 MMHG | WEIGHT: 209.44 LBS | RESPIRATION RATE: 15 BRPM | OXYGEN SATURATION: 95 % | HEIGHT: 70 IN | BODY MASS INDEX: 29.98 KG/M2

## 2022-03-17 DIAGNOSIS — C34.02 LUNG CANCER, MAIN BRONCHUS, LEFT: ICD-10-CM

## 2022-03-17 DIAGNOSIS — I10 PRIMARY HYPERTENSION: ICD-10-CM

## 2022-03-17 DIAGNOSIS — D53.9 MACROCYTIC ANEMIA: ICD-10-CM

## 2022-03-17 DIAGNOSIS — Z86.73 HISTORY OF COMPLETED STROKE: ICD-10-CM

## 2022-03-17 DIAGNOSIS — I25.10 CORONARY ARTERY DISEASE INVOLVING NATIVE CORONARY ARTERY OF NATIVE HEART WITHOUT ANGINA PECTORIS: ICD-10-CM

## 2022-03-17 DIAGNOSIS — D84.821 IMMUNODEFICIENCY DUE TO DRUGS: ICD-10-CM

## 2022-03-17 DIAGNOSIS — C79.51 SECONDARY MALIGNANT NEOPLASM OF BONE: ICD-10-CM

## 2022-03-17 DIAGNOSIS — R91.8 MULTIPLE PULMONARY NODULES: ICD-10-CM

## 2022-03-17 DIAGNOSIS — I70.0 ATHEROSCLEROSIS OF AORTA: Primary | ICD-10-CM

## 2022-03-17 DIAGNOSIS — Z87.891 FORMER SMOKER: ICD-10-CM

## 2022-03-17 DIAGNOSIS — I73.9 PAD (PERIPHERAL ARTERY DISEASE): ICD-10-CM

## 2022-03-17 DIAGNOSIS — Z79.899 IMMUNODEFICIENCY DUE TO DRUGS: ICD-10-CM

## 2022-03-17 DIAGNOSIS — K08.9 POOR DENTITION: ICD-10-CM

## 2022-03-17 DIAGNOSIS — I50.21 ACUTE SYSTOLIC HEART FAILURE: ICD-10-CM

## 2022-03-17 DIAGNOSIS — I47.19 MULTIFOCAL ATRIAL TACHYCARDIA: ICD-10-CM

## 2022-03-17 DIAGNOSIS — I25.2 HISTORY OF NON-ST ELEVATION MYOCARDIAL INFARCTION (NSTEMI): ICD-10-CM

## 2022-03-17 PROCEDURE — 99214 OFFICE O/P EST MOD 30 MIN: CPT | Mod: PBBFAC | Performed by: INTERNAL MEDICINE

## 2022-03-17 PROCEDURE — 99214 PR OFFICE/OUTPT VISIT, EST, LEVL IV, 30-39 MIN: ICD-10-PCS | Mod: S$PBB,,, | Performed by: INTERNAL MEDICINE

## 2022-03-17 PROCEDURE — 99999 PR PBB SHADOW E&M-EST. PATIENT-LVL IV: CPT | Mod: PBBFAC,,, | Performed by: INTERNAL MEDICINE

## 2022-03-17 PROCEDURE — 99214 OFFICE O/P EST MOD 30 MIN: CPT | Mod: S$PBB,,, | Performed by: INTERNAL MEDICINE

## 2022-03-17 PROCEDURE — 99999 PR PBB SHADOW E&M-EST. PATIENT-LVL IV: ICD-10-PCS | Mod: PBBFAC,,, | Performed by: INTERNAL MEDICINE

## 2022-03-17 RX ORDER — ATENOLOL 50 MG/1
75 TABLET ORAL DAILY
Qty: 45 TABLET | Refills: 11 | Status: SHIPPED | OUTPATIENT
Start: 2022-03-17 | End: 2022-04-18

## 2022-03-17 RX ORDER — LOSARTAN POTASSIUM 25 MG/1
25 TABLET ORAL DAILY
Qty: 90 TABLET | Refills: 3 | Status: SHIPPED | OUTPATIENT
Start: 2022-03-17 | End: 2022-04-18

## 2022-03-22 ENCOUNTER — INFUSION (OUTPATIENT)
Dept: INFUSION THERAPY | Facility: HOSPITAL | Age: 62
End: 2022-03-22
Attending: STUDENT IN AN ORGANIZED HEALTH CARE EDUCATION/TRAINING PROGRAM
Payer: MEDICARE

## 2022-03-22 VITALS
SYSTOLIC BLOOD PRESSURE: 132 MMHG | DIASTOLIC BLOOD PRESSURE: 77 MMHG | RESPIRATION RATE: 16 BRPM | OXYGEN SATURATION: 94 % | HEART RATE: 64 BPM | TEMPERATURE: 98 F

## 2022-03-22 DIAGNOSIS — R91.8 MULTIPLE PULMONARY NODULES: ICD-10-CM

## 2022-03-22 DIAGNOSIS — C79.51 SECONDARY MALIGNANT NEOPLASM OF BONE: ICD-10-CM

## 2022-03-22 DIAGNOSIS — C34.02 LUNG CANCER, MAIN BRONCHUS, LEFT: Primary | ICD-10-CM

## 2022-03-22 LAB
ALBUMIN SERPL BCP-MCNC: 3.3 G/DL (ref 3.5–5.2)
ALP SERPL-CCNC: 89 U/L (ref 55–135)
ALT SERPL W/O P-5'-P-CCNC: 18 U/L (ref 10–44)
ANION GAP SERPL CALC-SCNC: 8 MMOL/L (ref 8–16)
AST SERPL-CCNC: 24 U/L (ref 10–40)
BASOPHILS # BLD AUTO: 0.11 K/UL (ref 0–0.2)
BASOPHILS NFR BLD: 1.2 % (ref 0–1.9)
BILIRUB SERPL-MCNC: 0.6 MG/DL (ref 0.1–1)
BUN SERPL-MCNC: 19 MG/DL (ref 8–23)
CALCIUM SERPL-MCNC: 8.8 MG/DL (ref 8.7–10.5)
CHLORIDE SERPL-SCNC: 108 MMOL/L (ref 95–110)
CO2 SERPL-SCNC: 24 MMOL/L (ref 23–29)
CREAT SERPL-MCNC: 0.9 MG/DL (ref 0.5–1.4)
DIFFERENTIAL METHOD: ABNORMAL
EOSINOPHIL # BLD AUTO: 0.1 K/UL (ref 0–0.5)
EOSINOPHIL NFR BLD: 1.3 % (ref 0–8)
ERYTHROCYTE [DISTWIDTH] IN BLOOD BY AUTOMATED COUNT: 15 % (ref 11.5–14.5)
EST. GFR  (AFRICAN AMERICAN): >60 ML/MIN/1.73 M^2
EST. GFR  (NON AFRICAN AMERICAN): >60 ML/MIN/1.73 M^2
GLUCOSE SERPL-MCNC: 103 MG/DL (ref 70–110)
HCT VFR BLD AUTO: 38.9 % (ref 40–54)
HGB BLD-MCNC: 12.4 G/DL (ref 14–18)
IMM GRANULOCYTES # BLD AUTO: 0.02 K/UL (ref 0–0.04)
IMM GRANULOCYTES NFR BLD AUTO: 0.2 % (ref 0–0.5)
LYMPHOCYTES # BLD AUTO: 1.6 K/UL (ref 1–4.8)
LYMPHOCYTES NFR BLD: 16.2 % (ref 18–48)
MCH RBC QN AUTO: 31.5 PG (ref 27–31)
MCHC RBC AUTO-ENTMCNC: 31.9 G/DL (ref 32–36)
MCV RBC AUTO: 99 FL (ref 82–98)
MONOCYTES # BLD AUTO: 1.1 K/UL (ref 0.3–1)
MONOCYTES NFR BLD: 11 % (ref 4–15)
NEUTROPHILS # BLD AUTO: 6.7 K/UL (ref 1.8–7.7)
NEUTROPHILS NFR BLD: 70.1 % (ref 38–73)
NRBC BLD-RTO: 0 /100 WBC
PLATELET # BLD AUTO: 289 K/UL (ref 150–450)
PMV BLD AUTO: 10.3 FL (ref 9.2–12.9)
POTASSIUM SERPL-SCNC: 3.5 MMOL/L (ref 3.5–5.1)
PROT SERPL-MCNC: 7 G/DL (ref 6–8.4)
RBC # BLD AUTO: 3.94 M/UL (ref 4.6–6.2)
SODIUM SERPL-SCNC: 140 MMOL/L (ref 136–145)
WBC # BLD AUTO: 9.56 K/UL (ref 3.9–12.7)

## 2022-03-22 PROCEDURE — 25000003 PHARM REV CODE 250: Performed by: STUDENT IN AN ORGANIZED HEALTH CARE EDUCATION/TRAINING PROGRAM

## 2022-03-22 PROCEDURE — 96375 TX/PRO/DX INJ NEW DRUG ADDON: CPT

## 2022-03-22 PROCEDURE — 63600175 PHARM REV CODE 636 W HCPCS: Performed by: STUDENT IN AN ORGANIZED HEALTH CARE EDUCATION/TRAINING PROGRAM

## 2022-03-22 PROCEDURE — 80053 COMPREHEN METABOLIC PANEL: CPT | Performed by: STUDENT IN AN ORGANIZED HEALTH CARE EDUCATION/TRAINING PROGRAM

## 2022-03-22 PROCEDURE — 36591 DRAW BLOOD OFF VENOUS DEVICE: CPT

## 2022-03-22 PROCEDURE — A4216 STERILE WATER/SALINE, 10 ML: HCPCS | Performed by: STUDENT IN AN ORGANIZED HEALTH CARE EDUCATION/TRAINING PROGRAM

## 2022-03-22 PROCEDURE — 96374 THER/PROPH/DIAG INJ IV PUSH: CPT

## 2022-03-22 PROCEDURE — 96413 CHEMO IV INFUSION 1 HR: CPT

## 2022-03-22 PROCEDURE — 85025 COMPLETE CBC W/AUTO DIFF WBC: CPT | Performed by: STUDENT IN AN ORGANIZED HEALTH CARE EDUCATION/TRAINING PROGRAM

## 2022-03-22 RX ORDER — ONDANSETRON 2 MG/ML
8 INJECTION INTRAMUSCULAR; INTRAVENOUS
Status: COMPLETED | OUTPATIENT
Start: 2022-03-22 | End: 2022-03-22

## 2022-03-22 RX ORDER — SODIUM CHLORIDE 0.9 % (FLUSH) 0.9 %
10 SYRINGE (ML) INJECTION
Status: DISCONTINUED | OUTPATIENT
Start: 2022-03-22 | End: 2022-03-22 | Stop reason: HOSPADM

## 2022-03-22 RX ORDER — HEPARIN 100 UNIT/ML
500 SYRINGE INTRAVENOUS
Status: DISCONTINUED | OUTPATIENT
Start: 2022-03-22 | End: 2022-03-22 | Stop reason: HOSPADM

## 2022-03-22 RX ADMIN — GEMCITABINE HYDROCHLORIDE 2210 MG: 2 INJECTION, SOLUTION INTRAVENOUS at 02:03

## 2022-03-22 RX ADMIN — Medication 10 ML: at 03:03

## 2022-03-22 RX ADMIN — HEPARIN 500 UNITS: 100 SYRINGE at 03:03

## 2022-03-22 RX ADMIN — ONDANSETRON 8 MG: 2 INJECTION INTRAMUSCULAR; INTRAVENOUS at 02:03

## 2022-03-22 NOTE — PLAN OF CARE
Pt arrived to unit for C8D15 of Gemzar. STAT labs drawn. Pt recently discharged from the hospital. Saw  in clinic last week. Starting tx back up. CBC and CMP reviewed. Pt okay to proceed with chemo today. Pt feeling well overall. VSS. Given 8mg Zofran IVP. Gemzar given over 30 minutes and tolerated well. Pt received discharge instructions along with follow-up appointments. Left unit in NAD at time of discharge.

## 2022-04-05 ENCOUNTER — INFUSION (OUTPATIENT)
Dept: INFUSION THERAPY | Facility: HOSPITAL | Age: 62
End: 2022-04-05
Attending: STUDENT IN AN ORGANIZED HEALTH CARE EDUCATION/TRAINING PROGRAM
Payer: MEDICARE

## 2022-04-05 VITALS
TEMPERATURE: 98 F | OXYGEN SATURATION: 97 % | HEART RATE: 76 BPM | RESPIRATION RATE: 16 BRPM | DIASTOLIC BLOOD PRESSURE: 98 MMHG | SYSTOLIC BLOOD PRESSURE: 160 MMHG

## 2022-04-05 DIAGNOSIS — C34.02 LUNG CANCER, MAIN BRONCHUS, LEFT: Primary | ICD-10-CM

## 2022-04-05 DIAGNOSIS — C79.51 SECONDARY MALIGNANT NEOPLASM OF BONE: ICD-10-CM

## 2022-04-05 DIAGNOSIS — R91.8 MULTIPLE PULMONARY NODULES: ICD-10-CM

## 2022-04-05 LAB
ALBUMIN SERPL BCP-MCNC: 3.5 G/DL (ref 3.5–5.2)
ALP SERPL-CCNC: 87 U/L (ref 55–135)
ALT SERPL W/O P-5'-P-CCNC: 20 U/L (ref 10–44)
ANION GAP SERPL CALC-SCNC: 9 MMOL/L (ref 8–16)
AST SERPL-CCNC: 27 U/L (ref 10–40)
BASOPHILS # BLD AUTO: 0.04 K/UL (ref 0–0.2)
BASOPHILS NFR BLD: 0.5 % (ref 0–1.9)
BILIRUB SERPL-MCNC: 0.6 MG/DL (ref 0.1–1)
BUN SERPL-MCNC: 7 MG/DL (ref 8–23)
CALCIUM SERPL-MCNC: 8.6 MG/DL (ref 8.7–10.5)
CHLORIDE SERPL-SCNC: 102 MMOL/L (ref 95–110)
CO2 SERPL-SCNC: 26 MMOL/L (ref 23–29)
CREAT SERPL-MCNC: 0.9 MG/DL (ref 0.5–1.4)
DIFFERENTIAL METHOD: ABNORMAL
EOSINOPHIL # BLD AUTO: 0.1 K/UL (ref 0–0.5)
EOSINOPHIL NFR BLD: 0.6 % (ref 0–8)
ERYTHROCYTE [DISTWIDTH] IN BLOOD BY AUTOMATED COUNT: 15 % (ref 11.5–14.5)
EST. GFR  (AFRICAN AMERICAN): >60 ML/MIN/1.73 M^2
EST. GFR  (NON AFRICAN AMERICAN): >60 ML/MIN/1.73 M^2
GLUCOSE SERPL-MCNC: 166 MG/DL (ref 70–110)
HCT VFR BLD AUTO: 36.8 % (ref 40–54)
HGB BLD-MCNC: 12.1 G/DL (ref 14–18)
IMM GRANULOCYTES # BLD AUTO: 0.03 K/UL (ref 0–0.04)
IMM GRANULOCYTES NFR BLD AUTO: 0.4 % (ref 0–0.5)
LYMPHOCYTES # BLD AUTO: 1.4 K/UL (ref 1–4.8)
LYMPHOCYTES NFR BLD: 16.6 % (ref 18–48)
MCH RBC QN AUTO: 32.1 PG (ref 27–31)
MCHC RBC AUTO-ENTMCNC: 32.9 G/DL (ref 32–36)
MCV RBC AUTO: 98 FL (ref 82–98)
MONOCYTES # BLD AUTO: 0.8 K/UL (ref 0.3–1)
MONOCYTES NFR BLD: 9.8 % (ref 4–15)
NEUTROPHILS # BLD AUTO: 5.9 K/UL (ref 1.8–7.7)
NEUTROPHILS NFR BLD: 72.1 % (ref 38–73)
NRBC BLD-RTO: 0 /100 WBC
PLATELET # BLD AUTO: 251 K/UL (ref 150–450)
PMV BLD AUTO: 9.9 FL (ref 9.2–12.9)
POTASSIUM SERPL-SCNC: 2.9 MMOL/L (ref 3.5–5.1)
PROT SERPL-MCNC: 7 G/DL (ref 6–8.4)
RBC # BLD AUTO: 3.77 M/UL (ref 4.6–6.2)
SODIUM SERPL-SCNC: 137 MMOL/L (ref 136–145)
WBC # BLD AUTO: 8.17 K/UL (ref 3.9–12.7)

## 2022-04-05 PROCEDURE — 36591 DRAW BLOOD OFF VENOUS DEVICE: CPT

## 2022-04-05 PROCEDURE — 80053 COMPREHEN METABOLIC PANEL: CPT | Performed by: STUDENT IN AN ORGANIZED HEALTH CARE EDUCATION/TRAINING PROGRAM

## 2022-04-05 PROCEDURE — 25000003 PHARM REV CODE 250: Performed by: STUDENT IN AN ORGANIZED HEALTH CARE EDUCATION/TRAINING PROGRAM

## 2022-04-05 PROCEDURE — 96375 TX/PRO/DX INJ NEW DRUG ADDON: CPT

## 2022-04-05 PROCEDURE — 63600175 PHARM REV CODE 636 W HCPCS: Performed by: STUDENT IN AN ORGANIZED HEALTH CARE EDUCATION/TRAINING PROGRAM

## 2022-04-05 PROCEDURE — A4216 STERILE WATER/SALINE, 10 ML: HCPCS | Performed by: STUDENT IN AN ORGANIZED HEALTH CARE EDUCATION/TRAINING PROGRAM

## 2022-04-05 PROCEDURE — 96413 CHEMO IV INFUSION 1 HR: CPT

## 2022-04-05 PROCEDURE — 85025 COMPLETE CBC W/AUTO DIFF WBC: CPT | Performed by: STUDENT IN AN ORGANIZED HEALTH CARE EDUCATION/TRAINING PROGRAM

## 2022-04-05 RX ORDER — POTASSIUM CHLORIDE 20 MEQ/1
40 TABLET, EXTENDED RELEASE ORAL ONCE
Status: COMPLETED | OUTPATIENT
Start: 2022-04-05 | End: 2022-04-05

## 2022-04-05 RX ORDER — HEPARIN 100 UNIT/ML
500 SYRINGE INTRAVENOUS
Status: DISCONTINUED | OUTPATIENT
Start: 2022-04-05 | End: 2022-04-05 | Stop reason: HOSPADM

## 2022-04-05 RX ORDER — SODIUM CHLORIDE 0.9 % (FLUSH) 0.9 %
10 SYRINGE (ML) INJECTION
Status: DISCONTINUED | OUTPATIENT
Start: 2022-04-05 | End: 2022-04-05 | Stop reason: HOSPADM

## 2022-04-05 RX ORDER — ONDANSETRON 2 MG/ML
8 INJECTION INTRAMUSCULAR; INTRAVENOUS
Status: COMPLETED | OUTPATIENT
Start: 2022-04-05 | End: 2022-04-05

## 2022-04-05 RX ADMIN — POTASSIUM CHLORIDE 40 MEQ: 1500 TABLET, EXTENDED RELEASE ORAL at 02:04

## 2022-04-05 RX ADMIN — HEPARIN 500 UNITS: 100 SYRINGE at 04:04

## 2022-04-05 RX ADMIN — ONDANSETRON 8 MG: 2 INJECTION INTRAMUSCULAR; INTRAVENOUS at 03:04

## 2022-04-05 RX ADMIN — Medication 10 ML: at 04:04

## 2022-04-05 RX ADMIN — GEMCITABINE HYDROCHLORIDE 2210 MG: 2 INJECTION, SOLUTION INTRAVENOUS at 03:04

## 2022-04-05 NOTE — PLAN OF CARE
Patient arrived to unit for C9D1 Gemzar infusion. Pt reports recent weakness, fatigue, [place] and when walking for any distances. Decreased oral intake reported. Labs drawn and reviewed. Pt cleared for chemo per Dr. Hathaway. Notified Dr. Hathaway of HTN today and O2 sats of 92-97 during visit. Oral Kcl ordered due to K+ 2.9 today. Plan of care reviewed, patient agreeable to plan.Premeds of oral KCL 20 mEq and Zofran administered prior to chemo.  Patient tolerated Gemzar infusion well. Printed and gave pt handout on Hypokalemia and tips to increase in diet. VSS. Discharge instructions reviewed, patient instructed to return 4/29 for C9D15 Gemzar. Patient ambulated off unit accompanied by wife. Declined w/c. Patient in NAD at time of discharge.

## 2022-04-08 ENCOUNTER — TELEPHONE (OUTPATIENT)
Dept: HEMATOLOGY/ONCOLOGY | Facility: CLINIC | Age: 62
End: 2022-04-08
Payer: MEDICARE

## 2022-04-08 NOTE — TELEPHONE ENCOUNTER
Mitul for pt to call to inform that per dr. Hathaway, todays appointment is not needed. Pt is scheduled for 4/29 for follow up

## 2022-04-14 ENCOUNTER — PATIENT OUTREACH (OUTPATIENT)
Dept: ADMINISTRATIVE | Facility: OTHER | Age: 62
End: 2022-04-14
Payer: MEDICARE

## 2022-04-14 DIAGNOSIS — Z12.11 SCREEN FOR COLON CANCER: Primary | ICD-10-CM

## 2022-04-14 NOTE — PROGRESS NOTES
Health Maintenance Due   Topic Date Due    HIV Screening  Never done    Shingles Vaccine (1 of 2) Never done    Colorectal Cancer Screening  Never done    Pneumococcal Vaccines (Age 0-64) (2 - PPSV23 or PCV20) 08/24/2020    Influenza Vaccine (1) 09/01/2021    COVID-19 Vaccine (3 - Booster for Moderna series) 02/25/2022     Updates were requested from care everywhere.  Chart was reviewed for overdue Proactive [facility] Encounters ([facility]) topics (CRS, Breast Cancer Screening, Eye exam)  Health Maintenance has been updated.  LINKS immunization registry triggered.  Immunizations were reconciled.

## 2022-04-18 ENCOUNTER — OFFICE VISIT (OUTPATIENT)
Dept: CARDIOLOGY | Facility: CLINIC | Age: 62
End: 2022-04-18
Payer: MEDICARE

## 2022-04-18 VITALS
WEIGHT: 207.44 LBS | HEART RATE: 71 BPM | DIASTOLIC BLOOD PRESSURE: 98 MMHG | HEIGHT: 70 IN | BODY MASS INDEX: 29.7 KG/M2 | RESPIRATION RATE: 15 BRPM | OXYGEN SATURATION: 96 % | SYSTOLIC BLOOD PRESSURE: 192 MMHG

## 2022-04-18 DIAGNOSIS — I10 PRIMARY HYPERTENSION: ICD-10-CM

## 2022-04-18 DIAGNOSIS — I70.0 ATHEROSCLEROSIS OF AORTA: ICD-10-CM

## 2022-04-18 DIAGNOSIS — I47.19 MULTIFOCAL ATRIAL TACHYCARDIA: ICD-10-CM

## 2022-04-18 DIAGNOSIS — I73.9 PAD (PERIPHERAL ARTERY DISEASE): Primary | ICD-10-CM

## 2022-04-18 DIAGNOSIS — R09.89 POOR PERIPHERAL CIRCULATION: ICD-10-CM

## 2022-04-18 PROCEDURE — 99999 PR PBB SHADOW E&M-EST. PATIENT-LVL III: CPT | Mod: PBBFAC,,, | Performed by: INTERNAL MEDICINE

## 2022-04-18 PROCEDURE — 99213 OFFICE O/P EST LOW 20 MIN: CPT | Mod: PBBFAC | Performed by: INTERNAL MEDICINE

## 2022-04-18 PROCEDURE — 99999 PR PBB SHADOW E&M-EST. PATIENT-LVL III: ICD-10-PCS | Mod: PBBFAC,,, | Performed by: INTERNAL MEDICINE

## 2022-04-18 PROCEDURE — 99214 OFFICE O/P EST MOD 30 MIN: CPT | Mod: S$PBB,,, | Performed by: INTERNAL MEDICINE

## 2022-04-18 PROCEDURE — 99214 PR OFFICE/OUTPT VISIT, EST, LEVL IV, 30-39 MIN: ICD-10-PCS | Mod: S$PBB,,, | Performed by: INTERNAL MEDICINE

## 2022-04-18 RX ORDER — LOSARTAN POTASSIUM 100 MG/1
100 TABLET ORAL DAILY
Qty: 90 TABLET | Refills: 3 | Status: ON HOLD | OUTPATIENT
Start: 2022-04-18 | End: 2022-01-01 | Stop reason: HOSPADM

## 2022-04-18 RX ORDER — ATENOLOL 100 MG/1
100 TABLET ORAL DAILY
Qty: 90 TABLET | Refills: 3 | Status: SHIPPED | OUTPATIENT
Start: 2022-04-18 | End: 2022-06-16 | Stop reason: CLARIF

## 2022-04-18 NOTE — PROGRESS NOTES
CARDIOVASCULAR CONSULTATION    REASON FOR CONSULT:   Kashif Iverson is a 61 y.o. male who presents for follow-up.      HISTORY OF PRESENT ILLNESS:     Patient is a pleasant 61-year-old male.  Saw Dr. Green and me in the hospital, now wants to follow up with me.  During the hospital stay Dr. Green had performed a coronary angiogram.  That demonstrated nonobstructive CAD.  He was admitted with shock and he when she has recovered and doing fine.  Denies any chest pains at rest on exertion, orthopnea, PND.  Has history of lung cancer and lung mass.  EF on echo was 35-40%.      Notes April 2022:  Patient here for follow-up.  Denies any chest pains at rest on exertion, orthopnea, PND.        PAST MEDICAL HISTORY:     Past Medical History:   Diagnosis Date    Hypertension     Lung cancer     NSCLC    Lung mass     left lung       PAST SURGICAL HISTORY:     Past Surgical History:   Procedure Laterality Date    BRONCHOSCOPY N/A 8/30/2019    Procedure: Bronchoscopy;  Surgeon: [facility] Diagnostic Provider;  Location: [facility] OR [address] [place] [address];  Service: Anesthesiology;  Laterality: N/A;    CORONARY ANGIOGRAPHY N/A 3/4/2022    Procedure: ANGIOGRAM, CORONARY ARTERY;  Surgeon: Robson Green MD;  Location: [facility] CATH LAB;  Service: Cardiology;  Laterality: N/A;       ALLERGIES AND MEDICATION:   Review of patient's allergies indicates:  No Known Allergies     Medication List          Accurate as of April 18, 2022 11:43 AM. If you have any questions, ask your nurse or doctor.            CONTINUE taking these medications    aspirin 81 MG Chew  Take 1 tablet (81 mg total) by mouth once daily.     atenoloL 50 MG tablet  Commonly known as: TENORMIN  Take 1.5 tablets (75 mg total) by mouth once daily.     atorvastatin 80 MG tablet  Commonly known as: LIPITOR  Take 1 tablet (80 mg total) by mouth once daily.     clopidogreL 75 mg tablet  Commonly known as: PLAVIX  Take 1 tablet (75 mg total) by mouth once daily.     furosemide 40 MG  tablet  Commonly known as: LASIX  Take 1 tablet (40 mg total) by mouth once daily.     LIDOcaine-prilocaine cream  Commonly known as: EMLA  Apply generously to port site 30-60 min prior to chemo and then cover with saran wrap.     losartan 25 MG tablet  Commonly known as: COZAAR  Take 1 tablet (25 mg total) by mouth once daily.     ondansetron 8 MG tablet  Commonly known as: ZOFRAN  Take 1 tablet (8 mg total) by mouth 4 (four) times daily as needed for Nausea.     VITAMIN C ENERGY BOOSTER 1,000 mg Pwep  Generic drug: ascorbic acid-multivit-min            SOCIAL HISTORY:     Social History     Socioeconomic History    Marital status:    Tobacco Use    Smoking status: Former Smoker     Packs/day: 1.00     Years: 25.00     Pack years: 25.00     Types: Cigarettes     Quit date:      Years since quittin.2    Smokeless tobacco: Never Used   Substance and Sexual Activity    Alcohol use: Yes     Comment: ocassionally    Drug use: Never    Sexual activity: Yes     Partners: Female     Social Determinants of Health     Financial Resource Strain: Medium Risk    Difficulty of Paying Living Expenses: Somewhat hard   Food Insecurity: No Food Insecurity    Worried About Running Out of Food in the Last Year: Never true    Ran Out of Food in the Last Year: Never true   Transportation Needs: Unmet Transportation Needs    Lack of Transportation (Medical): Yes    Lack of Transportation (Non-Medical): Yes   Physical Activity: Insufficiently Active    Days of Exercise per Week: 3 days    Minutes of Exercise per Session: 10 min   Stress: Stress Concern Present    Feeling of Stress : To some extent   Social Connections: Unknown    Frequency of Communication with Friends and Family: Once a week    Frequency of Social Gatherings with Friends and Family: Never    Active Member of Clubs or Organizations: No    Attends Club or Organization Meetings: Never    Marital Status:    Housing Stability: High  "Risk    Unable to Pay for Housing in the Last Year: Yes    Number of Places Lived in the Last Year: 1    Unstable Housing in the Last Year: No       FAMILY HISTORY:     Family History   Problem Relation Age of Onset    Diabetes Mother     Heart defect Mother     Cancer Father     Cancer Sister     No Known Problems Son        REVIEW OF SYSTEMS:   Review of Systems   Constitutional: Negative.   HENT: Negative.    Eyes: Negative.    Cardiovascular: Negative.    Respiratory: Negative.    Endocrine: Negative.    Hematologic/Lymphatic: Negative.    Skin: Negative.    Musculoskeletal: Negative.    Gastrointestinal: Negative.    Genitourinary: Negative.    Neurological: Negative.    Psychiatric/Behavioral: Negative.    Allergic/Immunologic: Negative.        A 10 point review of systems was performed and all the pertinent positives have been mentioned. Rest of review of systems was negative.        PHYSICAL EXAM:     Vitals:    04/18/22 1138   BP: (!) 192/98   Pulse: 71   Resp: 15    Body mass index is 29.77 kg/m².  Weight: 94.1 kg (207 lb 7.3 oz)   Height: 5' 10" (177.8 cm)     Physical Exam  Vitals reviewed.   Constitutional:       Appearance: He is well-developed.   HENT:      Head: Normocephalic.   Eyes:      Conjunctiva/sclera: Conjunctivae normal.      Pupils: Pupils are equal, round, and reactive to light.   Cardiovascular:      Rate and Rhythm: Normal rate and regular rhythm.      Heart sounds: Normal heart sounds.   Pulmonary:      Effort: Pulmonary effort is normal.      Breath sounds: Normal breath sounds.   Abdominal:      General: Bowel sounds are normal.      Palpations: Abdomen is soft.   Musculoskeletal:      Cervical back: Normal range of motion and neck supple.   Skin:     General: Skin is warm.   Neurological:      Mental Status: He is alert and oriented to person, place, and time.           DATA:     Laboratory:  CBC:  Recent Labs   Lab 03/04/22  0424 03/22/22  1315 04/05/22  1331   WBC 9.65 9.56 " 8.17   Hemoglobin 11.8 L 12.4 L 12.1 L   Hematocrit 37.1 L 38.9 L 36.8 L   Platelets 329 289 251       CHEMISTRIES:  Recent Labs   Lab 02/23/22  0407 02/23/22  1258 02/25/22  0503 02/26/22  0542 02/28/22  1030 03/01/22  0400 03/04/22  0424 03/22/22  1315 04/05/22  1331   Glucose 178 H   < > 195 H   < >  --    < > 103 103 166 H   Sodium 133 L   < > 141   < >  --    < > 139 140 137   Potassium 2.6 LL   < > 4.5   < > 2.9 L   < > 3.5 3.5 2.9 L   BUN 9   < > 9   < >  --    < > 22 19 7 L   Creatinine 1.0   < > 1.2   < >  --    < > 1.0 0.9 0.9   eGFR if  >60   < > >60   < >  --    < > >60 >60 >60   eGFR if non African American >60   < > >60   < >  --    < > >60 >60 >60   Calcium 7.5 L   < > 8.8   < >  --    < > 8.5 L 8.8 8.6 L   Magnesium 1.6  --  1.7  --  1.7  --   --   --   --     < > = values in this interval not displayed.       CARDIAC BIOMARKERS:  Recent Labs   Lab 02/22/22 2222 02/23/22  0407 02/24/22  1045   Troponin I 1.759 H 1.018 H 0.610 H       COAGS:  Recent Labs   Lab 12/23/21  1030 02/22/22  1452 03/04/22  0424   INR 1.0 1.2 1.1       LIPIDS/LFTS:  Recent Labs   Lab 09/09/20  0956 09/28/20  0722 08/17/21  0733 08/24/21  0846 02/23/22  0407 02/23/22  1258 03/01/22  0400 03/22/22  1315 04/05/22  1331   Cholesterol 183  --  186  --  125  --   --   --   --    Triglycerides 87  --  172 H  --  224 H  --   --   --   --    HDL 33 L  --  38 L  --  26 L  --   --   --   --    LDL Cholesterol 132.6  --  113.6  --  54.2 L  --   --   --   --    Non-HDL Cholesterol 150  --  148  --  99  --   --   --   --    AST 18   < > 20   < > 29   < > 32 24 27   ALT 24   < > 26   < > 19   < > 30 18 20    < > = values in this interval not displayed.       Hemoglobin A1C   Date Value Ref Range Status   02/23/2022 5.0 4.0 - 5.6 % Final     Comment:     ADA Screening Guidelines:  5.7-6.4%  Consistent with prediabetes  >or=6.5%  Consistent with diabetes    High levels of fetal hemoglobin interfere with the HbA1C  assay.  Heterozygous hemoglobin variants (HbS, HgC, etc)do  not significantly interfere with this assay.   However, presence of multiple variants may affect accuracy.     08/17/2021 6.0 (H) 4.0 - 5.6 % Final     Comment:     ADA Screening Guidelines:  5.7-6.4%  Consistent with prediabetes  >or=6.5%  Consistent with diabetes    High levels of fetal hemoglobin interfere with the HbA1C  assay. Heterozygous hemoglobin variants (HbS, HgC, etc)do  not significantly interfere with this assay.   However, presence of multiple variants may affect accuracy.     09/04/2019 5.6 4.0 - 5.6 % Final     Comment:     ADA Screening Guidelines:  5.7-6.4%  Consistent with prediabetes  >or=6.5%  Consistent with diabetes  High levels of fetal hemoglobin interfere with the HbA1C  assay. Heterozygous hemoglobin variants (HbS, HgC, etc)do  not significantly interfere with this assay.   However, presence of multiple variants may affect accuracy.         TSH  Recent Labs   Lab 11/05/20  1107 01/20/21  1148 02/22/22  1452   TSH 0.575 0.888 1.310       The ASCVD Risk score (Jennie DC Jr., et al., 2013) failed to calculate for the following reasons:    The patient has a prior MI or stroke diagnosis             ASSESSMENT AND PLAN     Patient Active Problem List   Diagnosis    Former smoker    Multiple pulmonary nodules    Macrocytic anemia    History of completed stroke    Poor dentition    Lung cancer, main bronchus, left    Secondary malignant neoplasm of bone    Phlebitis after infusion    Immunodeficiency due to drugs    Atherosclerosis of aorta    Bronchiectasis without complication    Primary hypertension    Acute respiratory failure with hypoxia and hypercapnia    History of non-ST elevation myocardial infarction (NSTEMI)    Palliative care encounter    Multifocal atrial tachycardia    Acute systolic heart failure    PAD (peripheral artery disease)    Coronary artery disease involving native coronary artery of native heart without  angina pectoris         Nonischemic cardiomyopathy:  EF 35-40%.  Catheterization showed nonobstructive CAD.  Currently euvolemic.  Increase atenolol to 100 mg daily.  Increase losartan to 100 mg daily    Uncontrolled hypertension:  As above    Nonobstructive CAD:  Aggressive risk factor modification    Abnormal ABIs.  Check peripheral ultrasound.  Denies any resting leg pains or ulcers.      · Right resting LUIS FELIPE 0.29, is suggestive of severe right lower extremity arterial disease sufficient to cause rest pain.  · Left resting LUIS FELIPE 0.92, is suggestive of minimal left lower extremity arterial disease.  · PVR waveforms are abnormal at the calf and ankle level on the right.        Follow-up in 1 month        Thank you very much for involving me in the care of your patient.  Please do not hesitate to contact me if there are any questions.      Catia Roman MD, FACC, [facility]  Interventional Cardiologist, [facility].           This note was dictated with the help of speech recognition software.  There might be un-intended errors and/or substitutions.

## 2022-04-20 ENCOUNTER — INFUSION (OUTPATIENT)
Dept: INFUSION THERAPY | Facility: HOSPITAL | Age: 62
End: 2022-04-20
Attending: STUDENT IN AN ORGANIZED HEALTH CARE EDUCATION/TRAINING PROGRAM
Payer: MEDICARE

## 2022-04-20 VITALS
OXYGEN SATURATION: 95 % | SYSTOLIC BLOOD PRESSURE: 176 MMHG | HEART RATE: 63 BPM | TEMPERATURE: 98 F | DIASTOLIC BLOOD PRESSURE: 86 MMHG | RESPIRATION RATE: 16 BRPM

## 2022-04-20 DIAGNOSIS — C79.51 SECONDARY MALIGNANT NEOPLASM OF BONE: ICD-10-CM

## 2022-04-20 DIAGNOSIS — C34.02 LUNG CANCER, MAIN BRONCHUS, LEFT: Primary | ICD-10-CM

## 2022-04-20 DIAGNOSIS — R91.8 MULTIPLE PULMONARY NODULES: ICD-10-CM

## 2022-04-20 LAB
ALBUMIN SERPL BCP-MCNC: 3.6 G/DL (ref 3.5–5.2)
ALP SERPL-CCNC: 96 U/L (ref 55–135)
ALT SERPL W/O P-5'-P-CCNC: 25 U/L (ref 10–44)
ANION GAP SERPL CALC-SCNC: 7 MMOL/L (ref 8–16)
AST SERPL-CCNC: 31 U/L (ref 10–40)
BASOPHILS # BLD AUTO: 0.05 K/UL (ref 0–0.2)
BASOPHILS NFR BLD: 0.6 % (ref 0–1.9)
BILIRUB SERPL-MCNC: 0.7 MG/DL (ref 0.1–1)
BUN SERPL-MCNC: 6 MG/DL (ref 8–23)
CALCIUM SERPL-MCNC: 8.6 MG/DL (ref 8.7–10.5)
CHLORIDE SERPL-SCNC: 104 MMOL/L (ref 95–110)
CO2 SERPL-SCNC: 27 MMOL/L (ref 23–29)
CREAT SERPL-MCNC: 0.8 MG/DL (ref 0.5–1.4)
DIFFERENTIAL METHOD: ABNORMAL
EOSINOPHIL # BLD AUTO: 0.1 K/UL (ref 0–0.5)
EOSINOPHIL NFR BLD: 0.9 % (ref 0–8)
ERYTHROCYTE [DISTWIDTH] IN BLOOD BY AUTOMATED COUNT: 15.5 % (ref 11.5–14.5)
EST. GFR  (AFRICAN AMERICAN): >60 ML/MIN/1.73 M^2
EST. GFR  (NON AFRICAN AMERICAN): >60 ML/MIN/1.73 M^2
GLUCOSE SERPL-MCNC: 122 MG/DL (ref 70–110)
HCT VFR BLD AUTO: 37.4 % (ref 40–54)
HGB BLD-MCNC: 12.2 G/DL (ref 14–18)
IMM GRANULOCYTES # BLD AUTO: 0.02 K/UL (ref 0–0.04)
IMM GRANULOCYTES NFR BLD AUTO: 0.2 % (ref 0–0.5)
LYMPHOCYTES # BLD AUTO: 1.5 K/UL (ref 1–4.8)
LYMPHOCYTES NFR BLD: 18.2 % (ref 18–48)
MCH RBC QN AUTO: 31.6 PG (ref 27–31)
MCHC RBC AUTO-ENTMCNC: 32.6 G/DL (ref 32–36)
MCV RBC AUTO: 97 FL (ref 82–98)
MONOCYTES # BLD AUTO: 1 K/UL (ref 0.3–1)
MONOCYTES NFR BLD: 12.2 % (ref 4–15)
NEUTROPHILS # BLD AUTO: 5.5 K/UL (ref 1.8–7.7)
NEUTROPHILS NFR BLD: 67.9 % (ref 38–73)
NRBC BLD-RTO: 0 /100 WBC
PLATELET # BLD AUTO: 268 K/UL (ref 150–450)
PMV BLD AUTO: 10.2 FL (ref 9.2–12.9)
POTASSIUM SERPL-SCNC: 3.6 MMOL/L (ref 3.5–5.1)
PROT SERPL-MCNC: 7.1 G/DL (ref 6–8.4)
RBC # BLD AUTO: 3.86 M/UL (ref 4.6–6.2)
SODIUM SERPL-SCNC: 138 MMOL/L (ref 136–145)
WBC # BLD AUTO: 8.11 K/UL (ref 3.9–12.7)

## 2022-04-20 PROCEDURE — 25000003 PHARM REV CODE 250: Performed by: STUDENT IN AN ORGANIZED HEALTH CARE EDUCATION/TRAINING PROGRAM

## 2022-04-20 PROCEDURE — 85025 COMPLETE CBC W/AUTO DIFF WBC: CPT | Performed by: STUDENT IN AN ORGANIZED HEALTH CARE EDUCATION/TRAINING PROGRAM

## 2022-04-20 PROCEDURE — 96413 CHEMO IV INFUSION 1 HR: CPT

## 2022-04-20 PROCEDURE — 63600175 PHARM REV CODE 636 W HCPCS: Performed by: STUDENT IN AN ORGANIZED HEALTH CARE EDUCATION/TRAINING PROGRAM

## 2022-04-20 PROCEDURE — 36591 DRAW BLOOD OFF VENOUS DEVICE: CPT

## 2022-04-20 PROCEDURE — 96374 THER/PROPH/DIAG INJ IV PUSH: CPT

## 2022-04-20 PROCEDURE — A4216 STERILE WATER/SALINE, 10 ML: HCPCS | Performed by: STUDENT IN AN ORGANIZED HEALTH CARE EDUCATION/TRAINING PROGRAM

## 2022-04-20 PROCEDURE — 96375 TX/PRO/DX INJ NEW DRUG ADDON: CPT

## 2022-04-20 PROCEDURE — 80053 COMPREHEN METABOLIC PANEL: CPT | Performed by: STUDENT IN AN ORGANIZED HEALTH CARE EDUCATION/TRAINING PROGRAM

## 2022-04-20 RX ORDER — HEPARIN 100 UNIT/ML
500 SYRINGE INTRAVENOUS
Status: DISCONTINUED | OUTPATIENT
Start: 2022-04-20 | End: 2022-04-20 | Stop reason: HOSPADM

## 2022-04-20 RX ORDER — ONDANSETRON 2 MG/ML
8 INJECTION INTRAMUSCULAR; INTRAVENOUS
Status: COMPLETED | OUTPATIENT
Start: 2022-04-20 | End: 2022-04-20

## 2022-04-20 RX ORDER — SODIUM CHLORIDE 0.9 % (FLUSH) 0.9 %
10 SYRINGE (ML) INJECTION
Status: DISCONTINUED | OUTPATIENT
Start: 2022-04-20 | End: 2022-04-20 | Stop reason: HOSPADM

## 2022-04-20 RX ADMIN — HEPARIN 500 UNITS: 100 SYRINGE at 03:04

## 2022-04-20 RX ADMIN — Medication 10 ML: at 03:04

## 2022-04-20 RX ADMIN — ONDANSETRON 8 MG: 2 INJECTION INTRAMUSCULAR; INTRAVENOUS at 02:04

## 2022-04-20 RX ADMIN — GEMCITABINE HYDROCHLORIDE 2210 MG: 2 INJECTION, SOLUTION INTRAVENOUS at 03:04

## 2022-04-20 NOTE — PLAN OF CARE
Pt arrived to unit for C9D15 Gemzar and stat labs. CBC and CMP drawn. Labs reviewed and pt cleared for tx today. No new or worsening complaints to report. Given 8mg Zofran IVP. Then received Gemzar over 30 minutes. Tolerated well without issues. Pt received next appointments. Left unit in NAD at time of discharge.

## 2022-04-28 ENCOUNTER — HOSPITAL ENCOUNTER (OUTPATIENT)
Dept: CARDIOLOGY | Facility: HOSPITAL | Age: 62
Discharge: HOME | End: 2022-04-28
Attending: INTERNAL MEDICINE
Payer: MEDICARE

## 2022-04-28 DIAGNOSIS — I73.9 PAD (PERIPHERAL ARTERY DISEASE): ICD-10-CM

## 2022-04-28 DIAGNOSIS — R09.89 POOR PERIPHERAL CIRCULATION: ICD-10-CM

## 2022-04-28 LAB
ABDOMINAL IMA AP: 1.6 CM
ABDOMINAL IMA ED VEL: 0 CM/S
ABDOMINAL IMA PS VEL: 46 CM/S
ABDOMINAL IMA TRANS: 1.6 CM
ABDOMINAL INFRARENAL AORTA AP: 2.3 CM
ABDOMINAL INFRARENAL AORTA ED VEL: 0 CM/S
ABDOMINAL INFRARENAL AORTA PS VEL: 62 CM/S
ABDOMINAL INFRARENAL AORTA TRANS: 2.2 CM
ABDOMINAL JUXTARENAL AORTA AP: 2.3 CM
ABDOMINAL JUXTARENAL AORTA ED VEL: 0 CM/S
ABDOMINAL JUXTARENAL AORTA PS VEL: 56 CM/S
ABDOMINAL JUXTARENAL AORTA TRANS: 2.4 CM
ABDOMINAL LT COM ILIAC AP: 1.5 CM
ABDOMINAL LT COM ILIAC TRANS: 1.3 CM
ABDOMINAL LT COM ILIAC VEL: 138 CM/S
ABDOMINAL LT COM ILLIAC ED VEL: 0 CM/S
ABDOMINAL RT COM ILIAC AP: 1 CM
ABDOMINAL RT COM ILIAC TRANS: 1.1 CM
ABDOMINAL RT COM ILIAC VEL: 65 CM/S
ABDOMINAL RT COM ILLIAC ED VEL: 0 CM/S
ABDOMINAL SUPRARENAL AORTA AP: 2.7 CM
ABDOMINAL SUPRARENAL AORTA ED VEL: 0 CM/S
ABDOMINAL SUPRARENAL AORTA PS VEL: 131 CM/S
ABDOMINAL SUPRARENAL AORTA TRANS: 2.3 CM
LEFT ANT TIBIAL SYS PSV: 74 CM/S
LEFT CBA DIAS: 16 CM/S
LEFT CBA SYS: 89 CM/S
LEFT CCA DIST DIAS: 20 CM/S
LEFT CCA DIST SYS: 85 CM/S
LEFT CCA MID DIAS: 19 CM/S
LEFT CCA MID SYS: 94 CM/S
LEFT CCA PROX DIAS: 17 CM/S
LEFT CCA PROX SYS: 92 CM/S
LEFT CFA PSV: 214 CM/S
LEFT ECA DIAS: 11 CM/S
LEFT ECA SYS: 68 CM/S
LEFT EXTERNAL ILIAC PSV: 164 CM/S
LEFT ICA DIST DIAS: 30 CM/S
LEFT ICA DIST SYS: 91 CM/S
LEFT ICA MID DIAS: 23 CM/S
LEFT ICA MID SYS: 63 CM/S
LEFT ICA PROX DIAS: 15 CM/S
LEFT ICA PROX SYS: 45 CM/S
LEFT PERONEAL SYS PSV: 50 CM/S
LEFT POPLITEAL PSV: 59 CM/S
LEFT POST TIBIAL SYS PSV: 32 CM/S
LEFT PROFUNDA SYS PSV: 148 CM/S
LEFT SUPER FEMORAL DIST SYS PSV: 81 CM/S
LEFT SUPER FEMORAL MID SYS PSV: 120 CM/S
LEFT SUPER FEMORAL OSTIAL SYS PSV: 193 CM/S
LEFT SUPER FEMORAL PROX SYS PSV: 155 CM/S
LEFT TIB/PER TRUNK SYS PSV: 74 CM/S
LEFT VERTEBRAL DIAS: 15 CM/S
LEFT VERTEBRAL SYS: 57 CM/S
OHS CV CAROTID RIGHT ICA EDV HIGHEST: 21
OHS CV CAROTID ULTRASOUND LEFT ICA/CCA RATIO: 1.07
OHS CV CAROTID ULTRASOUND RIGHT ICA/CCA RATIO: 0.98
OHS CV LEFT LOWER EXTREMITY ABI (NO CALC): 0.98
OHS CV PV CAROTID LEFT HIGHEST CCA: 94
OHS CV PV CAROTID LEFT HIGHEST ICA: 91
OHS CV PV CAROTID RIGHT HIGHEST CCA: 107
OHS CV PV CAROTID RIGHT HIGHEST ICA: 65
OHS CV RIGHT ABI LOWER EXTREMITY (NO CALC): 0.49
OHS CV US CAROTID LEFT HIGHEST EDV: 30
RIGHT ANT TIBIAL SYS PSV: 42 CM/S
RIGHT CBA DIAS: 8 CM/S
RIGHT CBA SYS: 39 CM/S
RIGHT CCA DIST DIAS: 13 CM/S
RIGHT CCA DIST SYS: 66 CM/S
RIGHT CCA MID DIAS: 12 CM/S
RIGHT CCA MID SYS: 74 CM/S
RIGHT CCA PROX DIAS: 9 CM/S
RIGHT CCA PROX SYS: 107 CM/S
RIGHT CFA PSV: 146 CM/S
RIGHT ECA DIAS: 10 CM/S
RIGHT ECA SYS: 52 CM/S
RIGHT EXTERNAL ILLIAC PSV: 128 CM/S
RIGHT ICA DIST DIAS: 21 CM/S
RIGHT ICA DIST SYS: 56 CM/S
RIGHT ICA MID DIAS: 20 CM/S
RIGHT ICA MID SYS: 61 CM/S
RIGHT ICA PROX DIAS: 15 CM/S
RIGHT ICA PROX SYS: 65 CM/S
RIGHT PERONEAL SYS PSV: 21 CM/S
RIGHT POPLITEAL PSV: 39 CM/S
RIGHT POST TIBIAL SYS PSV: 20 CM/S
RIGHT PROFUNDA SYS PSV: 337 CM/S
RIGHT SUPER FEMORAL DIST SYS PSV: 0 CM/S
RIGHT SUPER FEMORAL MID SYS PSV: 55 CM/S
RIGHT SUPER FEMORAL OSTIAL SYS PSV: 158 CM/S
RIGHT SUPER FEMORAL PROX SYS PSV: 142 CM/S
RIGHT TIB/PER TRUNK SYS PSV: 24 CM/S
RIGHT VERTEBRAL DIAS: 17 CM/S
RIGHT VERTEBRAL SYS: 46 CM/S

## 2022-04-28 PROCEDURE — 93925 LOWER EXTREMITY STUDY: CPT

## 2022-04-28 PROCEDURE — 93925 CV US DOPPLER ARTERIAL LEGS BILATERAL (CUPID ONLY): ICD-10-PCS | Mod: 26,,, | Performed by: INTERNAL MEDICINE

## 2022-04-28 PROCEDURE — 93978 CV US ABDOMINAL AORTA EVALUATION (CUPID ONLY): ICD-10-PCS | Mod: 26,,, | Performed by: INTERNAL MEDICINE

## 2022-04-28 PROCEDURE — 93880 CV US DOPPLER CAROTID (CUPID ONLY): ICD-10-PCS | Mod: 26,,, | Performed by: INTERNAL MEDICINE

## 2022-04-28 PROCEDURE — 93978 VASCULAR STUDY: CPT

## 2022-04-28 PROCEDURE — 93880 EXTRACRANIAL BILAT STUDY: CPT

## 2022-04-28 PROCEDURE — 93880 EXTRACRANIAL BILAT STUDY: CPT | Mod: 26,,, | Performed by: INTERNAL MEDICINE

## 2022-04-28 PROCEDURE — 93978 VASCULAR STUDY: CPT | Mod: 26,,, | Performed by: INTERNAL MEDICINE

## 2022-04-28 PROCEDURE — 93925 LOWER EXTREMITY STUDY: CPT | Mod: 26,,, | Performed by: INTERNAL MEDICINE

## 2022-04-29 ENCOUNTER — OFFICE VISIT (OUTPATIENT)
Dept: HEMATOLOGY/ONCOLOGY | Facility: CLINIC | Age: 62
End: 2022-04-29
Payer: MEDICARE

## 2022-04-29 VITALS
BODY MASS INDEX: 29.35 KG/M2 | HEIGHT: 70 IN | DIASTOLIC BLOOD PRESSURE: 80 MMHG | SYSTOLIC BLOOD PRESSURE: 180 MMHG | WEIGHT: 205 LBS | OXYGEN SATURATION: 90 % | HEART RATE: 70 BPM

## 2022-04-29 DIAGNOSIS — C34.02 LUNG CANCER, MAIN BRONCHUS, LEFT: Primary | ICD-10-CM

## 2022-04-29 DIAGNOSIS — C79.51 SECONDARY MALIGNANT NEOPLASM OF BONE: ICD-10-CM

## 2022-04-29 DIAGNOSIS — I10 PRIMARY HYPERTENSION: ICD-10-CM

## 2022-04-29 DIAGNOSIS — I10 ESSENTIAL HYPERTENSION: ICD-10-CM

## 2022-04-29 PROCEDURE — 99215 OFFICE O/P EST HI 40 MIN: CPT | Mod: S$PBB,,, | Performed by: STUDENT IN AN ORGANIZED HEALTH CARE EDUCATION/TRAINING PROGRAM

## 2022-04-29 PROCEDURE — 99214 OFFICE O/P EST MOD 30 MIN: CPT | Mod: PBBFAC | Performed by: STUDENT IN AN ORGANIZED HEALTH CARE EDUCATION/TRAINING PROGRAM

## 2022-04-29 PROCEDURE — 99999 PR PBB SHADOW E&M-EST. PATIENT-LVL IV: ICD-10-PCS | Mod: PBBFAC,,, | Performed by: STUDENT IN AN ORGANIZED HEALTH CARE EDUCATION/TRAINING PROGRAM

## 2022-04-29 PROCEDURE — 99999 PR PBB SHADOW E&M-EST. PATIENT-LVL IV: CPT | Mod: PBBFAC,,, | Performed by: STUDENT IN AN ORGANIZED HEALTH CARE EDUCATION/TRAINING PROGRAM

## 2022-04-29 PROCEDURE — 99215 PR OFFICE/OUTPT VISIT, EST, LEVL V, 40-54 MIN: ICD-10-PCS | Mod: S$PBB,,, | Performed by: STUDENT IN AN ORGANIZED HEALTH CARE EDUCATION/TRAINING PROGRAM

## 2022-04-29 RX ORDER — AMLODIPINE BESYLATE 10 MG/1
10 TABLET ORAL DAILY
Qty: 30 TABLET | Refills: 11 | Status: ON HOLD | OUTPATIENT
Start: 2022-04-29 | End: 2022-01-01 | Stop reason: HOSPADM

## 2022-04-29 NOTE — Clinical Note
When to follow up: 5/27 Labs needed: 5/26 port draw labs cmp cbc and leave accessed for CT scan Images: 5/26 CT chest/abd/pelvis  Chemotherapy Regimen:  Next Treatment Date 5/3/2022      gemcitabine (GEMZAR) 2,210 mg in sodium chloride 0.9% 250 mL chemo infusion 5/17/2022      gemcitabine (GEMZAR) 2,210 mg in sodium chloride 0.9% 250 mL chemo infusion 5/31/2022      gemcitabine (GEMZAR) 2,210 mg in sodium chloride 0.9% 250 mL chemo infusion  Provider: Rivas

## 2022-04-29 NOTE — PROGRESS NOTES
"  PATIENT: Kashif Iverson  MRN: [id number]  DATE: 4/29/2022      Diagnosis:   1. Lung cancer, main bronchus, left    2. Primary hypertension    3. Essential hypertension    4. Secondary malignant neoplasm of bone        Chief Complaint: Lung Cancer      Oncologic History:    Oncologic History 1. 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 Palliative chemoradiation with carboplatin and paclitaxel; anaphylactic reaction to paclitaxel  2. Pembrolizumab  3. Pembrolizumab+ramucirumab (LungMAP trial)  4. Gemcitabine    Pathology Squamous cell carcinoma, no actionable mutations, PD-L1 5%        Subjective:    Interval History: Mr. Iverson returns for follow up.     8/30/2019 underwent bronchoscopy that showed "A partially obstructing (about 80% obstructed) mass was found in the middle portion in the left mainstem bronchus. The mass was large and bloody, circumferential, endobronchial, friable and necrotic. 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  No actionable mutations and PD-L1 5%.  9/19/21 staging PET CT showed "Hypermetabolic left lung mass concerning for primary pulmonary malignancy with metastatic disease involving the right 6th and 9th ribs as well as mediastinal and left supraclavicular lymph nodes."  Stage IV squamous cell carcinoma of the lung at diagnosis.    10/8/2019-10/21/2019 he received palliative chemoradiation for rib pain with carboplatin and paclitaxel.  He received 3 cycles of carboplatin and paclitaxel.  He developed anaphylactic reaction to paclitaxel.  The chest was treated with AP:PA fields and the right 9th rib was treated with anterior and posterior oblique fields at 300 cGy per fraction to 3000 cGy.   Lt chest 6X 300 10 / 10 3,000   Rt 9th rib 6X 300 10 / 10 3,000     10/31/2019-9/10/2020 he received pembrolizumab (completed 15 cycles, last dose 8/21/2020)  9/10/2020 PET CT showed " progression of disease.  He was then referred to Dr. Key for evaluation for clinical trials.  10/20/2020 he underwent repeat biopsy for LUNG MAP trial and was randomized to Ramucirumab + pembrolizumab.    11/17/2020 started ramucirumab+pembrolizumab.  Completed 4 cycles and then 2/10/2021 scans showed progression.    2/24/2021 he was subsequent started on gemcitabine with Dr. Cruz.  His care was transferred to Dr. Romo who continued his current regimen and now is currently under my care.   --  He is doing well since I last saw him.  No new symptoms to report.    Wife accompanies him at this visit.    Past Medical History:   Past Medical History:   Diagnosis Date    Hypertension     Lung cancer     NSCLC    Lung mass     left lung       Past Surgical HIstory:   Past Surgical History:   Procedure Laterality Date    BRONCHOSCOPY N/A 8/30/2019    Procedure: Bronchoscopy;  Surgeon: Miguel Diagnostic Provider;  Location: [facility] OR [address] [place] [address];  Service: Anesthesiology;  Laterality: N/A;    CORONARY ANGIOGRAPHY N/A 3/4/2022    Procedure: ANGIOGRAM, CORONARY ARTERY;  Surgeon: Robson Green MD;  Location: [facility] CATH LAB;  Service: Cardiology;  Laterality: N/A;       Family History:   Family History   Problem Relation Age of Onset    Diabetes Mother     Heart defect Mother     Cancer Father     Cancer Sister     No Known Problems Son        Social History:  reports that he quit smoking about 2 years ago. His smoking use included cigarettes. He has a 25.00 pack-year smoking history. He has never used smokeless tobacco. He reports current alcohol use. He reports that he does not use drugs.    Allergies:  Review of patient's allergies indicates:  No Known Allergies    Medications:  Current Outpatient Medications   Medication Sig Dispense Refill    ascorbic acid-multivit-min (VITAMIN C ENERGY BOOSTER) 1,000 mg PwEP Take by mouth. Patient takes 3,000 mg per day      aspirin 81 MG Chew Take 1 tablet (81 mg total) by  mouth once daily. 30 tablet 11    atenoloL (TENORMIN) 100 MG tablet Take 1 tablet (100 mg total) by mouth once daily. 90 tablet 3    atorvastatin (LIPITOR) 80 MG tablet Take 1 tablet (80 mg total) by mouth once daily. 90 tablet 3    clopidogreL (PLAVIX) 75 mg tablet Take 1 tablet (75 mg total) by mouth once daily. 30 tablet 11    furosemide (LASIX) 40 MG tablet Take 1 tablet (40 mg total) by mouth once daily. 30 tablet 11    LIDOcaine-prilocaine (EMLA) cream Apply generously to port site 30-60 min prior to chemo and then cover with saran wrap. 30 g 2    losartan (COZAAR) 100 MG tablet Take 1 tablet (100 mg total) by mouth once daily. 90 tablet 3    ondansetron (ZOFRAN) 8 MG tablet Take 1 tablet (8 mg total) by mouth 4 (four) times daily as needed for Nausea. 60 tablet 2    amLODIPine (NORVASC) 10 MG tablet Take 1 tablet (10 mg total) by mouth once daily. 30 tablet 11     No current facility-administered medications for this visit.       Review of Systems   Constitutional: Negative for activity change, appetite change, chills, diaphoresis, fatigue, fever and unexpected weight change.   HENT: Negative for nosebleeds and trouble swallowing.    Eyes: Negative for visual disturbance.   Respiratory: Negative for cough, chest tightness, shortness of breath and wheezing.    Cardiovascular: Negative for chest pain and leg swelling.   Gastrointestinal: Negative for abdominal distention, abdominal pain, blood in stool, constipation, diarrhea, nausea and vomiting.   Endocrine: Negative for cold intolerance and heat intolerance.   Genitourinary: Negative for difficulty urinating and dysuria.   Musculoskeletal: Negative for arthralgias, back pain and myalgias.   Skin: Negative for color change.   Neurological: Negative for dizziness, weakness, light-headedness, numbness and headaches.   Hematological: Negative for adenopathy. Bruises/bleeds easily.   Psychiatric/Behavioral: Negative for confusion.       ECOG Performance  "Status:     ECOG SCORE    0 - Fully active-able to carry on all pre-disease performance without restriction         Objective:      Vitals:   Vitals:    04/29/22 1302 04/29/22 1318   BP: (!) 218/100 (!) 180/80   BP Location: Left arm Right arm   Patient Position: Sitting    BP Method: Large (Automatic) Medium (Manual)   Pulse: 70    SpO2: (!) 90%    Weight: 93 kg (205 lb 0.4 oz)    Height: 5' 10" (1.778 m)      BMI: Body mass index is 29.42 kg/m².    Physical Exam  Constitutional:       General: He is not in acute distress.     Appearance: Normal appearance. He is not ill-appearing.   HENT:      Head: Normocephalic and atraumatic.      Mouth/Throat:      Pharynx: No oropharyngeal exudate or posterior oropharyngeal erythema.   Eyes:      General: No scleral icterus.     Extraocular Movements: Extraocular movements intact.      Conjunctiva/sclera: Conjunctivae normal.      Pupils: Pupils are equal, round, and reactive to light.   Cardiovascular:      Rate and Rhythm: Normal rate and regular rhythm.      Heart sounds: No murmur heard.    No friction rub. No gallop.      Comments: Right chest wall port c/d/i  Pulmonary:      Effort: Pulmonary effort is normal. No respiratory distress.      Breath sounds: No stridor. No wheezing, rhonchi or rales.   Abdominal:      General: Bowel sounds are normal. There is no distension.      Palpations: Abdomen is soft. There is no mass.      Tenderness: There is no abdominal tenderness. There is no guarding or rebound.   Musculoskeletal:         General: No swelling or tenderness (negative homans). Normal range of motion.      Cervical back: Normal range of motion and neck supple.      Right lower leg: No edema.      Left lower leg: No edema.   Skin:     General: Skin is warm and dry.      Findings: Bruising present.   Neurological:      General: No focal deficit present.      Mental Status: He is alert.         Laboratory Data:   Recent Results (from the past 168 hour(s))   CV " Ultrasound doppler arterial legs bilat    Collection Time: 04/28/22 11:41 AM   Result Value Ref Range    Left profunda sys  cm/s    Left super femoral prox sys  cm/s    Left super femoral mid sys  cm/s    Left super femoral dist sys PSV 81 cm/s    Left ant tibial sys PSV 74 cm/s    Left tib/per trunk sys PSV 74 cm/s    Left post tibial sys PSV 32 cm/s    Left peroneal sys PSV 50 cm/s    Left popliteal PSV 59 cm/s    Right popliteal PSV 39 cm/s    Left CFA  cm/s    Right CFA  cm/s    Right profunda sys  cm/s    Right super femoral prox sys  cm/s    Right super femoral mid sys PSV 55 cm/s    Right ant tibial sys PSV 42 cm/s    Right tib/per trunk sys PSV 24 cm/s    Right post tibial sys PSV 20 cm/s    Right peroneal sys PSV 21 cm/s    Left LUIS FELIPE 0.98     Right LUIS FELIPE 0.49     Left External Iliac  cm/s    Right External Illiac  cm/s    Left super femoral ostial sys  cm/s    Right super femoral ostial sys  cm/s    Right super femoral dist sys PSV 0 cm/s   CV Abdominal Aorta    Collection Time: 04/28/22 11:45 AM   Result Value Ref Range    Abdominal rt com iliac carole 65 cm/s    Abdominal rt com iliac AP 1 cm    Abdominal rt com iliac trans 1.1 cm    Abdominal lt com iliac carole 138 cm/s    Abdominal lt com iliac AP 1.5 cm    Abdominal lt com iliac trans 1.3 cm    ABDOMINAL RT COM ILLIAC ED CAROLE 0 cm/s    ABDOMINAL LT COM ILLIAC ED CAROLE 0 cm/s    Abdominal suprarenal aorta carole 131 cm/s    ABDOMINAL SUPRARENAL AORTA ED CAROLE 0 cm/s    ABDOMINAL SUPRARENAL AORTA AP 2.7 cm    ABDOMINAL SUPRARENAL AORTA TRANS 2.3 cm    Abdominal juxtarenal aorta carole 56 cm/s    ABDOMINAL JUXTARENAL AORTA ED CAROLE 0 cm/s    ABDOMINAL JUXTARENAL AORTA AP 2.3 cm    ABDOMINAL JUXTARENAL AORTA TRANS 2.4 cm    Abdominal infrarenal aorta carole 62 cm/s    ABDOMINAL INFRARENAL AORTA ED CAROLE 0 cm/s    ABDOMINAL INFRARENAL AORTA AP 2.3 cm    ABDOMINAL INFRARENAL AORTA TRANS 2.2 cm    Abdomincal  inferior messenteric carole 46 cm/s    ABDOMINAL NANCY ED CAROLE 0 cm/s    ABDOMINAL NANCY AP 1.6 cm    ABDOMINAL NANCY TRANS 1.6 cm   CV Ultrasound Bilateral Doppler Carotid    Collection Time: 04/28/22 11:45 AM   Result Value Ref Range    [facility] CV CAROTID ULTRASOUND LEFT ICA/CCA RATIO 1.07     [facility] CV CAROTID ULTRASOUND RIGHT ICA/CCA RATIO 0.98     [facility] CV PV CAROTID LEFT HIGHEST ICA 91.00     [facility] CV PV CAROTID LEFT HIGHEST CCA 94     [facility] CV PV CAROTID RIGHT HIGHEST ICA 65.00     [facility] CV PV CAROTID RIGHT HIGHEST      [facility] CV CAROTID RIGHT ICA EDV HIGHEST 21.00     LT Highest EDV 30.00     Right CCA prox sys 107 cm/s    Right CCA prox larsen 9 cm/s    Right cca dist sys 66 cm/s    Right CCA dist larsen 13 cm/s    Right ICA prox sys 65 cm/s    Right ICA prox larsen 15 cm/s    Right ICA mid sys 61 cm/s    Right ICA mid larsen 20 cm/s    Right ICA dist sys 56 cm/s    Right ICA dist larsen 21 cm/s    Right eca sys 52 cm/s    Right vertebral sys 46 cm/s    Left CCA prox sys 92 cm/s    Left CCA prox larsen 17 cm/s    Left CCA dist sys 85 cm/s    Left CCA dist larsen 20 cm/s    Left ICA prox sys 45 cm/s    Left ICA prox larsen 15 cm/s    Left ICA mid sys 63 cm/s    Left ICA mid larsen 23 cm/s    Left ICA dist sys 91 cm/s    Left ICA dist larsen 30 cm/s    Left ECA sys 68 cm/s    Left vertebral sys 57 cm/s    Left CCA mid sys 94 cm/s    Left CCA mid larsen 19 cm/s    Right CCA mid sys 74 cm/s    Right CCA mid larsen 12 cm/s    Right vertebral larsen 17 cm/s    Right ECA larsen 10 cm/s    Left vertebral larsen 15 cm/s    Left ECA larsen 11 cm/s    Left CBA sys 89 cm/s    Left CBA larsen 16 cm/s    Rigth CBA sys 39 cm/s    Right CBA larsen 8 cm/s      Latest Reference Range & Units 03/04/22 04:24   WBC 3.90 - 12.70 K/uL 9.65   RBC 4.60 - 6.20 M/uL 3.72 (L)   Hemoglobin 14.0 - 18.0 g/dL 11.8 (L)   Hematocrit 40.0 - 54.0 % 37.1 (L)   MCV 82 - 98 fL 100 (H)   MCH 27.0 - 31.0 pg 31.7 (H)   MCHC 32.0 - 36.0 g/dL 31.8 (L)   RDW 11.5 - 14.5 % 16.0 (H)   Platelets 150 - 450 K/uL  "329   MPV 9.2 - 12.9 fL 10.3   Protime 9.0 - 12.5 sec 11.7   INR 0.8 - 1.2  1.1 [1]   APTT 21.0 - 32.0 sec 30.2 [2]   Sodium 136 - 145 mmol/L 139   Potassium 3.5 - 5.1 mmol/L 3.5   Chloride 95 - 110 mmol/L 103   CO2 23 - 29 mmol/L 27   Anion Gap 8 - 16 mmol/L 9   BUN 8 - 23 mg/dL 22   Creatinine 0.5 - 1.4 mg/dL 1.0   EGFR if non African American >60 mL/min/1.73 m^2 >60 [3]   EGFR if African American >60 mL/min/1.73 m^2 >60   Glucose 70 - 110 mg/dL 103   Calcium 8.7 - 10.5 mg/dL 8.5 (L)       Imaging:     CV Ultrasound Bilateral Doppler Carotid    Result Date: 4/28/2022  · There is 0-19% right Internal Carotid Stenosis. · There is 0-19% left Internal Carotid Stenosis.      CV Ultrasound doppler arterial legs bilat    Result Date: 4/28/2022  Right leg: >50% PFA stenosis Mid SFA occlusion Moderately decreased LUIS FELIPE, 0.48. Bi/monophasic waveforms. Left leg: >50% CFA stenosis Biphasic waveforms Borderline normal LUIS FELIPE, 0.98.    CV Abdominal Aorta    Result Date: 4/28/2022  Suprarenal aortic ectasia without evidence of AAA, max diam 2.7cm. Aortic atherosclerosis. Increased flow velocity across L MORIAH suggesting >50% stenosis.        Assessment:       1. Lung cancer, main bronchus, left    2. Primary hypertension    3. Essential hypertension    4. Secondary malignant neoplasm of bone           Plan:       Problem List Items Addressed This Visit        Cardiac/Vascular    Primary hypertension    Current Assessment & Plan     Poorly controlled today but asymptomatic.  Used to be on amlodipine but it was discontinued.  -restart amlodipine and continue the rest of his antihypertensive regimen               Oncology    Lung cancer, main bronchus, left - Primary    Overview     8/30/2019 underwent bronchoscopy that showed "A partially obstructing (about 80% obstructed) mass was found in the middle portion in the left mainstem bronchus. The mass was large and bloody, circumferential, endobronchial, friable and necrotic. The lesion was " "not traversed." He was diagnosed with poorly differentiated squamous cell carcinoma of the left bronchus.  No actionable mutations and PD-L1 5%.  9/19/21 staging PET CT showed "Hypermetabolic left lung mass concerning for primary pulmonary malignancy with metastatic disease involving the right 6th and 9th ribs as well as mediastinal and left supraclavicular lymph nodes."  Stage IV squamous cell carcinoma of the lung at diagnosis.    10/8/2019-10/21/2019 he received palliative chemoradiation for rib pain with carboplatin and paclitaxel.  He received 3 cycles of carboplatin and paclitaxel.  He developed anaphylactic reaction to paclitaxel.  The chest was treated with AP:PA fields and the right 9th rib was treated with anterior and posterior oblique fields at 300 cGy per fraction to 3000 cGy.   Lt chest 6X 300 10 / 10 3,000   Rt 9th rib 6X 300 10 / 10 3,000     10/31/2019-9/10/2020 he received pembrolizumab (completed 15 cycles, last dose 8/21/2020)  9/10/2020 PET CT showed progression of disease.  He was then referred to Dr. Key for evaluation for clinical trials.  10/20/2020 he underwent repeat biopsy for LUNG MAP trial and was randomized to Ramucirumab + pembrolizumab.    11/17/2020 started ramucirumab+pembrolizumab.  Completed 4 cycles and then 2/10/2021 scans showed progression.    2/24/2021 he was subsequent started on gemcitabine with Dr. Cruz.           Current Assessment & Plan     Doing well since his last visit. No new symptoms.  -labs will be done next week prior to next cycle  -labs, CT scan, and follow up in 1 month.           Relevant Orders    CT Chest Abdomen Pelvis With Contrast (xpd)    Secondary malignant neoplasm of bone    Relevant Orders    CT Chest Abdomen Pelvis With Contrast (xpd)      Other Visit Diagnoses     Essential hypertension        Relevant Medications    amLODIPine (NORVASC) 10 MG tablet          Orders Placed This Encounter   Procedures    CT Chest Abdomen Pelvis With Contrast " (xpd)           Mario Hathaway MD  Hematology Oncology

## 2022-04-29 NOTE — ASSESSMENT & PLAN NOTE
Doing well since his last visit. No new symptoms.  -labs will be done next week prior to next cycle  -labs, CT scan, and follow up in 1 month.

## 2022-04-29 NOTE — ASSESSMENT & PLAN NOTE
Poorly controlled today but asymptomatic.  Used to be on amlodipine but it was discontinued.  -restart amlodipine and continue the rest of his antihypertensive regimen

## 2022-05-03 ENCOUNTER — INFUSION (OUTPATIENT)
Dept: INFUSION THERAPY | Facility: HOSPITAL | Age: 62
End: 2022-05-03
Attending: STUDENT IN AN ORGANIZED HEALTH CARE EDUCATION/TRAINING PROGRAM
Payer: MEDICARE

## 2022-05-03 VITALS
TEMPERATURE: 98 F | RESPIRATION RATE: 16 BRPM | HEART RATE: 58 BPM | DIASTOLIC BLOOD PRESSURE: 72 MMHG | OXYGEN SATURATION: 95 % | SYSTOLIC BLOOD PRESSURE: 128 MMHG

## 2022-05-03 DIAGNOSIS — R91.8 MULTIPLE PULMONARY NODULES: ICD-10-CM

## 2022-05-03 DIAGNOSIS — C79.51 SECONDARY MALIGNANT NEOPLASM OF BONE: ICD-10-CM

## 2022-05-03 DIAGNOSIS — C34.02 LUNG CANCER, MAIN BRONCHUS, LEFT: Primary | ICD-10-CM

## 2022-05-03 LAB
ALBUMIN SERPL BCP-MCNC: 3.4 G/DL (ref 3.5–5.2)
ALP SERPL-CCNC: 83 U/L (ref 55–135)
ALT SERPL W/O P-5'-P-CCNC: 23 U/L (ref 10–44)
ANION GAP SERPL CALC-SCNC: 5 MMOL/L (ref 8–16)
AST SERPL-CCNC: 26 U/L (ref 10–40)
BASOPHILS # BLD AUTO: 0.03 K/UL (ref 0–0.2)
BASOPHILS NFR BLD: 0.4 % (ref 0–1.9)
BILIRUB SERPL-MCNC: 0.6 MG/DL (ref 0.1–1)
BUN SERPL-MCNC: 10 MG/DL (ref 8–23)
CALCIUM SERPL-MCNC: 8.5 MG/DL (ref 8.7–10.5)
CHLORIDE SERPL-SCNC: 108 MMOL/L (ref 95–110)
CO2 SERPL-SCNC: 27 MMOL/L (ref 23–29)
CREAT SERPL-MCNC: 0.8 MG/DL (ref 0.5–1.4)
DIFFERENTIAL METHOD: ABNORMAL
EOSINOPHIL # BLD AUTO: 0.1 K/UL (ref 0–0.5)
EOSINOPHIL NFR BLD: 1.2 % (ref 0–8)
ERYTHROCYTE [DISTWIDTH] IN BLOOD BY AUTOMATED COUNT: 16.6 % (ref 11.5–14.5)
EST. GFR  (AFRICAN AMERICAN): >60 ML/MIN/1.73 M^2
EST. GFR  (NON AFRICAN AMERICAN): >60 ML/MIN/1.73 M^2
GLUCOSE SERPL-MCNC: 134 MG/DL (ref 70–110)
HCT VFR BLD AUTO: 35.2 % (ref 40–54)
HGB BLD-MCNC: 11.6 G/DL (ref 14–18)
IMM GRANULOCYTES # BLD AUTO: 0.02 K/UL (ref 0–0.04)
IMM GRANULOCYTES NFR BLD AUTO: 0.3 % (ref 0–0.5)
LYMPHOCYTES # BLD AUTO: 1.4 K/UL (ref 1–4.8)
LYMPHOCYTES NFR BLD: 20.1 % (ref 18–48)
MCH RBC QN AUTO: 32 PG (ref 27–31)
MCHC RBC AUTO-ENTMCNC: 33 G/DL (ref 32–36)
MCV RBC AUTO: 97 FL (ref 82–98)
MONOCYTES # BLD AUTO: 1.2 K/UL (ref 0.3–1)
MONOCYTES NFR BLD: 17.2 % (ref 4–15)
NEUTROPHILS # BLD AUTO: 4.2 K/UL (ref 1.8–7.7)
NEUTROPHILS NFR BLD: 60.8 % (ref 38–73)
NRBC BLD-RTO: 0 /100 WBC
PLATELET # BLD AUTO: 205 K/UL (ref 150–450)
PMV BLD AUTO: 9.4 FL (ref 9.2–12.9)
POTASSIUM SERPL-SCNC: 3.5 MMOL/L (ref 3.5–5.1)
PROT SERPL-MCNC: 6.8 G/DL (ref 6–8.4)
RBC # BLD AUTO: 3.62 M/UL (ref 4.6–6.2)
SODIUM SERPL-SCNC: 140 MMOL/L (ref 136–145)
WBC # BLD AUTO: 6.93 K/UL (ref 3.9–12.7)

## 2022-05-03 PROCEDURE — 80053 COMPREHEN METABOLIC PANEL: CPT | Performed by: STUDENT IN AN ORGANIZED HEALTH CARE EDUCATION/TRAINING PROGRAM

## 2022-05-03 PROCEDURE — 36591 DRAW BLOOD OFF VENOUS DEVICE: CPT

## 2022-05-03 PROCEDURE — 63600175 PHARM REV CODE 636 W HCPCS: Performed by: STUDENT IN AN ORGANIZED HEALTH CARE EDUCATION/TRAINING PROGRAM

## 2022-05-03 PROCEDURE — 96375 TX/PRO/DX INJ NEW DRUG ADDON: CPT

## 2022-05-03 PROCEDURE — A4216 STERILE WATER/SALINE, 10 ML: HCPCS | Performed by: STUDENT IN AN ORGANIZED HEALTH CARE EDUCATION/TRAINING PROGRAM

## 2022-05-03 PROCEDURE — 96413 CHEMO IV INFUSION 1 HR: CPT

## 2022-05-03 PROCEDURE — 25000003 PHARM REV CODE 250: Performed by: STUDENT IN AN ORGANIZED HEALTH CARE EDUCATION/TRAINING PROGRAM

## 2022-05-03 PROCEDURE — 85025 COMPLETE CBC W/AUTO DIFF WBC: CPT | Performed by: STUDENT IN AN ORGANIZED HEALTH CARE EDUCATION/TRAINING PROGRAM

## 2022-05-03 RX ORDER — ONDANSETRON 2 MG/ML
8 INJECTION INTRAMUSCULAR; INTRAVENOUS
Status: CANCELLED | OUTPATIENT
Start: 2022-05-31

## 2022-05-03 RX ORDER — SODIUM CHLORIDE 0.9 % (FLUSH) 0.9 %
10 SYRINGE (ML) INJECTION
Status: DISCONTINUED | OUTPATIENT
Start: 2022-05-03 | End: 2022-05-03 | Stop reason: HOSPADM

## 2022-05-03 RX ORDER — HEPARIN 100 UNIT/ML
500 SYRINGE INTRAVENOUS
Status: DISCONTINUED | OUTPATIENT
Start: 2022-05-03 | End: 2022-05-03 | Stop reason: HOSPADM

## 2022-05-03 RX ORDER — HEPARIN 100 UNIT/ML
500 SYRINGE INTRAVENOUS
Status: CANCELLED | OUTPATIENT
Start: 2022-05-31

## 2022-05-03 RX ORDER — ONDANSETRON 2 MG/ML
8 INJECTION INTRAMUSCULAR; INTRAVENOUS
Status: COMPLETED | OUTPATIENT
Start: 2022-05-03 | End: 2022-05-03

## 2022-05-03 RX ORDER — SODIUM CHLORIDE 0.9 % (FLUSH) 0.9 %
10 SYRINGE (ML) INJECTION
Status: CANCELLED | OUTPATIENT
Start: 2022-05-31

## 2022-05-03 RX ADMIN — Medication 10 ML: at 03:05

## 2022-05-03 RX ADMIN — ONDANSETRON 8 MG: 2 INJECTION INTRAMUSCULAR; INTRAVENOUS at 02:05

## 2022-05-03 RX ADMIN — GEMCITABINE HYDROCHLORIDE 2210 MG: 2 INJECTION, SOLUTION INTRAVENOUS at 02:05

## 2022-05-03 RX ADMIN — HEPARIN 500 UNITS: 100 SYRINGE at 03:05

## 2022-05-03 NOTE — PLAN OF CARE
Patient arrived to unit for C10D1 Gemzar infusion. No new or worsening symptoms to report today. Labs reviewed and pt cleared for chemo per Dr. Hathaway. Plan of care reviewed, patient agreeable to plan. Zofran IVP administered prior to Gemzar. Patient tolerated infusion well.VSS.AVS printed and given to pt. Discharge instructions reviewed, patient instructed to return 5/17 for C10 D15. Patient ambulated off unit accompanied by spouse. Patient in NAD at time of discharge.

## 2022-05-17 ENCOUNTER — INFUSION (OUTPATIENT)
Dept: INFUSION THERAPY | Facility: HOSPITAL | Age: 62
End: 2022-05-17
Attending: STUDENT IN AN ORGANIZED HEALTH CARE EDUCATION/TRAINING PROGRAM
Payer: MEDICARE

## 2022-05-17 ENCOUNTER — HOSPITAL ENCOUNTER (INPATIENT)
Facility: HOSPITAL | Age: 62
LOS: 5 days | Discharge: HOME HEALTH | DRG: 208 | End: 2022-05-22
Attending: EMERGENCY MEDICINE | Admitting: HOSPITALIST
Payer: MEDICARE

## 2022-05-17 DIAGNOSIS — R07.9 CHEST PAIN: ICD-10-CM

## 2022-05-17 DIAGNOSIS — R09.02 HYPOXIC: ICD-10-CM

## 2022-05-17 DIAGNOSIS — R00.0 TACHYCARDIA: ICD-10-CM

## 2022-05-17 DIAGNOSIS — Z46.59 ENCOUNTER FOR OROGASTRIC (OG) TUBE PLACEMENT: ICD-10-CM

## 2022-05-17 DIAGNOSIS — J96.01 ACUTE RESPIRATORY FAILURE WITH HYPOXIA AND HYPERCAPNIA: Primary | ICD-10-CM

## 2022-05-17 DIAGNOSIS — J96.02 ACUTE RESPIRATORY FAILURE WITH HYPOXIA AND HYPERCAPNIA: Primary | ICD-10-CM

## 2022-05-17 DIAGNOSIS — J96.90 RESPIRATORY FAILURE: ICD-10-CM

## 2022-05-17 DIAGNOSIS — C34.02 LUNG CANCER, MAIN BRONCHUS, LEFT: ICD-10-CM

## 2022-05-17 DIAGNOSIS — I50.42 CHRONIC COMBINED SYSTOLIC AND DIASTOLIC HEART FAILURE: ICD-10-CM

## 2022-05-17 PROBLEM — A41.9 SEPSIS: Status: ACTIVE | Noted: 2022-05-17

## 2022-05-17 PROBLEM — I95.9 TRANSIENT HYPOTENSION: Status: ACTIVE | Noted: 2022-05-17

## 2022-05-17 PROBLEM — I50.43 ACUTE ON CHRONIC COMBINED SYSTOLIC AND DIASTOLIC HEART FAILURE: Status: ACTIVE | Noted: 2022-02-23

## 2022-05-17 LAB
ALBUMIN SERPL BCP-MCNC: 3.9 G/DL (ref 3.5–5.2)
ALLENS TEST: ABNORMAL
ALP SERPL-CCNC: 101 U/L (ref 55–135)
ALT SERPL W/O P-5'-P-CCNC: 22 U/L (ref 10–44)
ANION GAP SERPL CALC-SCNC: 13 MMOL/L (ref 8–16)
AST SERPL-CCNC: 42 U/L (ref 10–40)
BACTERIA #/AREA URNS HPF: ABNORMAL /HPF
BASOPHILS # BLD AUTO: 0.07 K/UL (ref 0–0.2)
BASOPHILS NFR BLD: 0.5 % (ref 0–1.9)
BILIRUB SERPL-MCNC: 1.3 MG/DL (ref 0.1–1)
BILIRUB UR QL STRIP: NEGATIVE
BNP SERPL-MCNC: 1051 PG/ML (ref 0–99)
BUN SERPL-MCNC: 9 MG/DL (ref 8–23)
CALCIUM SERPL-MCNC: 9.2 MG/DL (ref 8.7–10.5)
CHLORIDE SERPL-SCNC: 103 MMOL/L (ref 95–110)
CLARITY UR: CLEAR
CO2 SERPL-SCNC: 19 MMOL/L (ref 23–29)
COLOR UR: COLORLESS
CREAT SERPL-MCNC: 0.9 MG/DL (ref 0.5–1.4)
CTP QC/QA: YES
CTP QC/QA: YES
D DIMER PPP IA.FEU-MCNC: 0.84 MG/L FEU
DELSYS: ABNORMAL
DIFFERENTIAL METHOD: ABNORMAL
EOSINOPHIL # BLD AUTO: 0 K/UL (ref 0–0.5)
EOSINOPHIL NFR BLD: 0.1 % (ref 0–8)
ERYTHROCYTE [DISTWIDTH] IN BLOOD BY AUTOMATED COUNT: 17.5 % (ref 11.5–14.5)
ERYTHROCYTE [SEDIMENTATION RATE] IN BLOOD BY WESTERGREN METHOD: 20 MM/H
EST. GFR  (AFRICAN AMERICAN): >60 ML/MIN/1.73 M^2
EST. GFR  (NON AFRICAN AMERICAN): >60 ML/MIN/1.73 M^2
FIO2: 40
FIO2: 50
FIO2: 80
FLOW: 12
FLOW: 15
GLUCOSE SERPL-MCNC: 146 MG/DL (ref 70–110)
GLUCOSE UR QL STRIP: NEGATIVE
HCO3 UR-SCNC: 24.3 MMOL/L (ref 24–28)
HCO3 UR-SCNC: 24.7 MMOL/L (ref 24–28)
HCO3 UR-SCNC: 24.8 MMOL/L (ref 24–28)
HCO3 UR-SCNC: 25.7 MMOL/L (ref 24–28)
HCT VFR BLD AUTO: 41.7 % (ref 40–54)
HGB BLD-MCNC: 14.1 G/DL (ref 14–18)
HGB UR QL STRIP: ABNORMAL
IMM GRANULOCYTES # BLD AUTO: 0.04 K/UL (ref 0–0.04)
IMM GRANULOCYTES NFR BLD AUTO: 0.3 % (ref 0–0.5)
KETONES UR QL STRIP: NEGATIVE
LACTATE SERPL-SCNC: 4 MMOL/L (ref 0.5–2.2)
LEUKOCYTE ESTERASE UR QL STRIP: NEGATIVE
LYMPHOCYTES # BLD AUTO: 1 K/UL (ref 1–4.8)
LYMPHOCYTES NFR BLD: 7.4 % (ref 18–48)
MCH RBC QN AUTO: 32.2 PG (ref 27–31)
MCHC RBC AUTO-ENTMCNC: 33.8 G/DL (ref 32–36)
MCV RBC AUTO: 95 FL (ref 82–98)
MICROSCOPIC COMMENT: ABNORMAL
MODE: ABNORMAL
MONOCYTES # BLD AUTO: 1 K/UL (ref 0.3–1)
MONOCYTES NFR BLD: 7.6 % (ref 4–15)
NEUTROPHILS # BLD AUTO: 11.4 K/UL (ref 1.8–7.7)
NEUTROPHILS NFR BLD: 84.1 % (ref 38–73)
NITRITE UR QL STRIP: NEGATIVE
NRBC BLD-RTO: 0 /100 WBC
PCO2 BLDA: 37.3 MMHG (ref 35–45)
PCO2 BLDA: 37.5 MMHG (ref 35–45)
PCO2 BLDA: 41.9 MMHG (ref 35–45)
PCO2 BLDA: 54 MMHG (ref 35–45)
PEEP: 5
PH SMN: 7.26 [PH] (ref 7.35–7.45)
PH SMN: 7.38 [PH] (ref 7.35–7.45)
PH SMN: 7.43 [PH] (ref 7.35–7.45)
PH SMN: 7.45 [PH] (ref 7.35–7.45)
PH UR STRIP: 7 [PH] (ref 5–8)
PLATELET # BLD AUTO: ABNORMAL K/UL (ref 150–450)
PLATELET BLD QL SMEAR: ABNORMAL
PMV BLD AUTO: ABNORMAL FL (ref 9.2–12.9)
PO2 BLDA: 286 MMHG (ref 80–100)
PO2 BLDA: 51 MMHG (ref 80–100)
PO2 BLDA: 58 MMHG (ref 80–100)
PO2 BLDA: 90 MMHG (ref 40–60)
POC BE: -1 MMOL/L
POC BE: -3 MMOL/L
POC BE: 1 MMOL/L
POC BE: 2 MMOL/L
POC MOLECULAR INFLUENZA A AGN: NEGATIVE
POC MOLECULAR INFLUENZA B AGN: NEGATIVE
POC SATURATED O2: 100 % (ref 95–100)
POC SATURATED O2: 86 % (ref 95–100)
POC SATURATED O2: 91 % (ref 95–100)
POC SATURATED O2: 97 % (ref 95–100)
POC TCO2: 26 MMOL/L (ref 23–27)
POC TCO2: 26 MMOL/L (ref 23–27)
POC TCO2: 26 MMOL/L (ref 24–29)
POC TCO2: 27 MMOL/L (ref 23–27)
POTASSIUM SERPL-SCNC: 4.1 MMOL/L (ref 3.5–5.1)
PROCALCITONIN SERPL IA-MCNC: 0.05 NG/ML
PROT SERPL-MCNC: 7.9 G/DL (ref 6–8.4)
PROT UR QL STRIP: ABNORMAL
RBC # BLD AUTO: 4.38 M/UL (ref 4.6–6.2)
RBC #/AREA URNS HPF: 17 /HPF (ref 0–4)
SAMPLE: ABNORMAL
SARS-COV-2 RDRP RESP QL NAA+PROBE: NEGATIVE
SITE: ABNORMAL
SODIUM SERPL-SCNC: 135 MMOL/L (ref 136–145)
SP GR UR STRIP: 1.01 (ref 1–1.03)
SP02: 100
SP02: 94
SP02: 98
TROPONIN I SERPL DL<=0.01 NG/ML-MCNC: 0.02 NG/ML (ref 0–0.03)
TROPONIN I SERPL DL<=0.01 NG/ML-MCNC: 0.04 NG/ML (ref 0–0.03)
URN SPEC COLLECT METH UR: ABNORMAL
UROBILINOGEN UR STRIP-ACNC: NEGATIVE EU/DL
VT: 450
VT: 450
WBC # BLD AUTO: 13.6 K/UL (ref 3.9–12.7)

## 2022-05-17 PROCEDURE — 25000003 PHARM REV CODE 250: Performed by: EMERGENCY MEDICINE

## 2022-05-17 PROCEDURE — 96367 TX/PROPH/DG ADDL SEQ IV INF: CPT

## 2022-05-17 PROCEDURE — 36415 COLL VENOUS BLD VENIPUNCTURE: CPT | Performed by: EMERGENCY MEDICINE

## 2022-05-17 PROCEDURE — 20000000 HC ICU ROOM

## 2022-05-17 PROCEDURE — 63600175 PHARM REV CODE 636 W HCPCS

## 2022-05-17 PROCEDURE — U0002 COVID-19 LAB TEST NON-CDC: HCPCS | Performed by: EMERGENCY MEDICINE

## 2022-05-17 PROCEDURE — 94003 VENT MGMT INPAT SUBQ DAY: CPT

## 2022-05-17 PROCEDURE — 99900035 HC TECH TIME PER 15 MIN (STAT)

## 2022-05-17 PROCEDURE — 85347 COAGULATION TIME ACTIVATED: CPT

## 2022-05-17 PROCEDURE — 85025 COMPLETE CBC W/AUTO DIFF WBC: CPT | Performed by: EMERGENCY MEDICINE

## 2022-05-17 PROCEDURE — 94761 N-INVAS EAR/PLS OXIMETRY MLT: CPT

## 2022-05-17 PROCEDURE — 83880 ASSAY OF NATRIURETIC PEPTIDE: CPT | Performed by: EMERGENCY MEDICINE

## 2022-05-17 PROCEDURE — 84484 ASSAY OF TROPONIN QUANT: CPT | Mod: 91 | Performed by: EMERGENCY MEDICINE

## 2022-05-17 PROCEDURE — 83605 ASSAY OF LACTIC ACID: CPT | Performed by: EMERGENCY MEDICINE

## 2022-05-17 PROCEDURE — 93010 ELECTROCARDIOGRAM REPORT: CPT | Mod: ,,, | Performed by: INTERNAL MEDICINE

## 2022-05-17 PROCEDURE — 31500 INSERT EMERGENCY AIRWAY: CPT

## 2022-05-17 PROCEDURE — 87070 CULTURE OTHR SPECIMN AEROBIC: CPT | Performed by: INTERNAL MEDICINE

## 2022-05-17 PROCEDURE — 93010 EKG 12-LEAD: ICD-10-PCS | Mod: ,,, | Performed by: INTERNAL MEDICINE

## 2022-05-17 PROCEDURE — 99291 CRITICAL CARE FIRST HOUR: CPT | Mod: 25

## 2022-05-17 PROCEDURE — 84145 PROCALCITONIN (PCT): CPT | Performed by: EMERGENCY MEDICINE

## 2022-05-17 PROCEDURE — 87040 BLOOD CULTURE FOR BACTERIA: CPT | Mod: 59 | Performed by: EMERGENCY MEDICINE

## 2022-05-17 PROCEDURE — 25000003 PHARM REV CODE 250: Performed by: INTERNAL MEDICINE

## 2022-05-17 PROCEDURE — 94660 CPAP INITIATION&MGMT: CPT

## 2022-05-17 PROCEDURE — 27000221 HC OXYGEN, UP TO 24 HOURS

## 2022-05-17 PROCEDURE — 63600175 PHARM REV CODE 636 W HCPCS: Performed by: EMERGENCY MEDICINE

## 2022-05-17 PROCEDURE — 25000003 PHARM REV CODE 250

## 2022-05-17 PROCEDURE — 27000190 HC CPAP FULL FACE MASK W/VALVE

## 2022-05-17 PROCEDURE — 36600 WITHDRAWAL OF ARTERIAL BLOOD: CPT

## 2022-05-17 PROCEDURE — 80053 COMPREHEN METABOLIC PANEL: CPT | Performed by: EMERGENCY MEDICINE

## 2022-05-17 PROCEDURE — 82803 BLOOD GASES ANY COMBINATION: CPT

## 2022-05-17 PROCEDURE — 25500020 PHARM REV CODE 255: Performed by: EMERGENCY MEDICINE

## 2022-05-17 PROCEDURE — 85379 FIBRIN DEGRADATION QUANT: CPT | Performed by: EMERGENCY MEDICINE

## 2022-05-17 PROCEDURE — 87205 SMEAR GRAM STAIN: CPT | Performed by: INTERNAL MEDICINE

## 2022-05-17 PROCEDURE — 81000 URINALYSIS NONAUTO W/SCOPE: CPT | Performed by: INTERNAL MEDICINE

## 2022-05-17 PROCEDURE — 96365 THER/PROPH/DIAG IV INF INIT: CPT

## 2022-05-17 PROCEDURE — 93005 ELECTROCARDIOGRAM TRACING: CPT

## 2022-05-17 PROCEDURE — 63600175 PHARM REV CODE 636 W HCPCS: Performed by: INTERNAL MEDICINE

## 2022-05-17 RX ORDER — FUROSEMIDE 10 MG/ML
40 INJECTION INTRAMUSCULAR; INTRAVENOUS
Status: DISCONTINUED | OUTPATIENT
Start: 2022-05-17 | End: 2022-05-22 | Stop reason: HOSPADM

## 2022-05-17 RX ORDER — ATORVASTATIN CALCIUM 40 MG/1
80 TABLET, FILM COATED ORAL DAILY
Status: DISCONTINUED | OUTPATIENT
Start: 2022-05-18 | End: 2022-05-17

## 2022-05-17 RX ORDER — SODIUM CHLORIDE 9 MG/ML
INJECTION, SOLUTION INTRAVENOUS
Status: DISCONTINUED | OUTPATIENT
Start: 2022-05-17 | End: 2022-05-18 | Stop reason: SDUPTHER

## 2022-05-17 RX ORDER — SODIUM CHLORIDE 0.9 % (FLUSH) 0.9 %
10 SYRINGE (ML) INJECTION EVERY 12 HOURS PRN
Status: DISCONTINUED | OUTPATIENT
Start: 2022-05-17 | End: 2022-05-22 | Stop reason: HOSPADM

## 2022-05-17 RX ORDER — PREDNISONE 20 MG/1
40 TABLET ORAL DAILY
Status: DISCONTINUED | OUTPATIENT
Start: 2022-05-18 | End: 2022-05-18

## 2022-05-17 RX ORDER — KETAMINE HYDROCHLORIDE 100 MG/ML
INJECTION, SOLUTION INTRAMUSCULAR; INTRAVENOUS
Status: COMPLETED
Start: 2022-05-17 | End: 2022-05-17

## 2022-05-17 RX ORDER — SIMETHICONE 80 MG
1 TABLET,CHEWABLE ORAL 4 TIMES DAILY PRN
Status: DISCONTINUED | OUTPATIENT
Start: 2022-05-17 | End: 2022-05-22 | Stop reason: HOSPADM

## 2022-05-17 RX ORDER — ACETAMINOPHEN 325 MG/1
650 TABLET ORAL EVERY 4 HOURS PRN
Status: DISCONTINUED | OUTPATIENT
Start: 2022-05-17 | End: 2022-05-17

## 2022-05-17 RX ORDER — ETOMIDATE 2 MG/ML
INJECTION INTRAVENOUS
Status: DISCONTINUED
Start: 2022-05-17 | End: 2022-05-17 | Stop reason: WASHOUT

## 2022-05-17 RX ORDER — PROPOFOL 10 MG/ML
0-100 INJECTION, EMULSION INTRAVENOUS CONTINUOUS
Status: DISCONTINUED | OUTPATIENT
Start: 2022-05-17 | End: 2022-05-17

## 2022-05-17 RX ORDER — ATORVASTATIN CALCIUM 40 MG/1
80 TABLET, FILM COATED ORAL DAILY
Status: DISCONTINUED | OUTPATIENT
Start: 2022-05-18 | End: 2022-05-22 | Stop reason: HOSPADM

## 2022-05-17 RX ORDER — ACETAMINOPHEN 325 MG/1
650 TABLET ORAL EVERY 4 HOURS PRN
Status: DISCONTINUED | OUTPATIENT
Start: 2022-05-17 | End: 2022-05-22 | Stop reason: HOSPADM

## 2022-05-17 RX ORDER — NAPROXEN SODIUM 220 MG/1
81 TABLET, FILM COATED ORAL DAILY
Status: DISCONTINUED | OUTPATIENT
Start: 2022-05-18 | End: 2022-05-22 | Stop reason: HOSPADM

## 2022-05-17 RX ORDER — IPRATROPIUM BROMIDE AND ALBUTEROL SULFATE 2.5; .5 MG/3ML; MG/3ML
3 SOLUTION RESPIRATORY (INHALATION) EVERY 6 HOURS
Status: DISCONTINUED | OUTPATIENT
Start: 2022-05-18 | End: 2022-05-22 | Stop reason: HOSPADM

## 2022-05-17 RX ORDER — KETAMINE HYDROCHLORIDE 100 MG/ML
150 INJECTION, SOLUTION INTRAMUSCULAR; INTRAVENOUS
Status: COMPLETED | OUTPATIENT
Start: 2022-05-17 | End: 2022-05-17

## 2022-05-17 RX ORDER — METHYLPREDNISOLONE SOD SUCC 125 MG
125 VIAL (EA) INJECTION ONCE
Status: COMPLETED | OUTPATIENT
Start: 2022-05-17 | End: 2022-05-17

## 2022-05-17 RX ORDER — CLOPIDOGREL BISULFATE 75 MG/1
75 TABLET ORAL DAILY
Status: DISCONTINUED | OUTPATIENT
Start: 2022-05-18 | End: 2022-05-17

## 2022-05-17 RX ORDER — SUCCINYLCHOLINE CHLORIDE 20 MG/ML
120 INJECTION INTRAMUSCULAR; INTRAVENOUS
Status: COMPLETED | OUTPATIENT
Start: 2022-05-17 | End: 2022-05-17

## 2022-05-17 RX ORDER — FENTANYL CITRATE 50 UG/ML
50 INJECTION, SOLUTION INTRAMUSCULAR; INTRAVENOUS
Status: COMPLETED | OUTPATIENT
Start: 2022-05-17 | End: 2022-05-17

## 2022-05-17 RX ORDER — FENTANYL CITRATE 50 UG/ML
INJECTION, SOLUTION INTRAMUSCULAR; INTRAVENOUS
Status: COMPLETED
Start: 2022-05-17 | End: 2022-05-17

## 2022-05-17 RX ORDER — MAG HYDROX/ALUMINUM HYD/SIMETH 200-200-20
30 SUSPENSION, ORAL (FINAL DOSE FORM) ORAL EVERY 4 HOURS PRN
Status: DISCONTINUED | OUTPATIENT
Start: 2022-05-17 | End: 2022-05-17

## 2022-05-17 RX ORDER — FENTANYL CITRATE 50 UG/ML
100 INJECTION, SOLUTION INTRAMUSCULAR; INTRAVENOUS
Status: COMPLETED | OUTPATIENT
Start: 2022-05-17 | End: 2022-05-17

## 2022-05-17 RX ORDER — NAPROXEN SODIUM 220 MG/1
81 TABLET, FILM COATED ORAL DAILY
Status: DISCONTINUED | OUTPATIENT
Start: 2022-05-18 | End: 2022-05-17

## 2022-05-17 RX ORDER — AMOXICILLIN 250 MG
1 CAPSULE ORAL 2 TIMES DAILY PRN
Status: DISCONTINUED | OUTPATIENT
Start: 2022-05-17 | End: 2022-05-17

## 2022-05-17 RX ORDER — MAG HYDROX/ALUMINUM HYD/SIMETH 200-200-20
30 SUSPENSION, ORAL (FINAL DOSE FORM) ORAL EVERY 4 HOURS PRN
Status: DISCONTINUED | OUTPATIENT
Start: 2022-05-17 | End: 2022-05-22 | Stop reason: HOSPADM

## 2022-05-17 RX ORDER — CLOPIDOGREL BISULFATE 75 MG/1
75 TABLET ORAL DAILY
Status: DISCONTINUED | OUTPATIENT
Start: 2022-05-18 | End: 2022-05-22 | Stop reason: HOSPADM

## 2022-05-17 RX ORDER — ENOXAPARIN SODIUM 100 MG/ML
40 INJECTION SUBCUTANEOUS EVERY 24 HOURS
Status: DISCONTINUED | OUTPATIENT
Start: 2022-05-17 | End: 2022-05-22 | Stop reason: HOSPADM

## 2022-05-17 RX ORDER — AMOXICILLIN 250 MG
1 CAPSULE ORAL 2 TIMES DAILY PRN
Status: DISCONTINUED | OUTPATIENT
Start: 2022-05-17 | End: 2022-05-22 | Stop reason: HOSPADM

## 2022-05-17 RX ORDER — SUCCINYLCHOLINE CHLORIDE 20 MG/ML
INJECTION INTRAMUSCULAR; INTRAVENOUS
Status: COMPLETED
Start: 2022-05-17 | End: 2022-05-17

## 2022-05-17 RX ORDER — IPRATROPIUM BROMIDE AND ALBUTEROL SULFATE 2.5; .5 MG/3ML; MG/3ML
3 SOLUTION RESPIRATORY (INHALATION) EVERY 4 HOURS PRN
Status: DISCONTINUED | OUTPATIENT
Start: 2022-05-17 | End: 2022-05-22 | Stop reason: HOSPADM

## 2022-05-17 RX ORDER — ONDANSETRON 2 MG/ML
4 INJECTION INTRAMUSCULAR; INTRAVENOUS EVERY 8 HOURS PRN
Status: DISCONTINUED | OUTPATIENT
Start: 2022-05-17 | End: 2022-05-22 | Stop reason: HOSPADM

## 2022-05-17 RX ORDER — SIMETHICONE 80 MG
1 TABLET,CHEWABLE ORAL 4 TIMES DAILY PRN
Status: DISCONTINUED | OUTPATIENT
Start: 2022-05-17 | End: 2022-05-17

## 2022-05-17 RX ORDER — FENTANYL CITRATE-0.9 % NACL/PF 10 MCG/ML
0-500 PLASTIC BAG, INJECTION (ML) INTRAVENOUS CONTINUOUS
Status: DISCONTINUED | OUTPATIENT
Start: 2022-05-17 | End: 2022-05-17

## 2022-05-17 RX ORDER — PROCHLORPERAZINE EDISYLATE 5 MG/ML
5 INJECTION INTRAMUSCULAR; INTRAVENOUS EVERY 6 HOURS PRN
Status: DISCONTINUED | OUTPATIENT
Start: 2022-05-17 | End: 2022-05-22 | Stop reason: HOSPADM

## 2022-05-17 RX ORDER — ASPIRIN 325 MG
325 TABLET ORAL
Status: COMPLETED | OUTPATIENT
Start: 2022-05-17 | End: 2022-05-17

## 2022-05-17 RX ORDER — NALOXONE HCL 0.4 MG/ML
0.02 VIAL (ML) INJECTION
Status: DISCONTINUED | OUTPATIENT
Start: 2022-05-17 | End: 2022-05-22 | Stop reason: HOSPADM

## 2022-05-17 RX ADMIN — FENTANYL CITRATE 50 MCG: 50 INJECTION, SOLUTION INTRAMUSCULAR; INTRAVENOUS at 04:05

## 2022-05-17 RX ADMIN — SODIUM CHLORIDE, SODIUM LACTATE, POTASSIUM CHLORIDE, AND CALCIUM CHLORIDE 1000 ML: .6; .31; .03; .02 INJECTION, SOLUTION INTRAVENOUS at 05:05

## 2022-05-17 RX ADMIN — AZITHROMYCIN MONOHYDRATE 500 MG: 500 INJECTION, POWDER, LYOPHILIZED, FOR SOLUTION INTRAVENOUS at 11:05

## 2022-05-17 RX ADMIN — Medication 50 MCG/HR: at 05:05

## 2022-05-17 RX ADMIN — ASPIRIN 325 MG ORAL TABLET 325 MG: 325 PILL ORAL at 03:05

## 2022-05-17 RX ADMIN — PIPERACILLIN AND TAZOBACTAM 4.5 G: 4; .5 INJECTION, POWDER, FOR SOLUTION INTRAVENOUS at 08:05

## 2022-05-17 RX ADMIN — KETAMINE HYDROCHLORIDE 150 MG: 100 INJECTION INTRAMUSCULAR; INTRAVENOUS at 04:05

## 2022-05-17 RX ADMIN — KETAMINE HYDROCHLORIDE 150 MG: 100 INJECTION, SOLUTION INTRAMUSCULAR; INTRAVENOUS at 04:05

## 2022-05-17 RX ADMIN — SODIUM CHLORIDE 5 ML/HR: 0.9 INJECTION, SOLUTION INTRAVENOUS at 11:05

## 2022-05-17 RX ADMIN — SUCCINYLCHOLINE CHLORIDE 120 MG: 20 INJECTION INTRAMUSCULAR; INTRAVENOUS at 04:05

## 2022-05-17 RX ADMIN — IOHEXOL 100 ML: 300 INJECTION, SOLUTION INTRAVENOUS at 03:05

## 2022-05-17 RX ADMIN — PROPOFOL 10 MCG/KG/MIN: 10 INJECTION, EMULSION INTRAVENOUS at 04:05

## 2022-05-17 RX ADMIN — FENTANYL CITRATE 100 MCG: 50 INJECTION, SOLUTION INTRAMUSCULAR; INTRAVENOUS at 05:05

## 2022-05-17 RX ADMIN — SODIUM CHLORIDE 5 ML/HR: 0.9 INJECTION, SOLUTION INTRAVENOUS at 08:05

## 2022-05-17 RX ADMIN — VANCOMYCIN HYDROCHLORIDE 2000 MG: 100 INJECTION, POWDER, LYOPHILIZED, FOR SOLUTION INTRAVENOUS at 05:05

## 2022-05-17 RX ADMIN — METHYLPREDNISOLONE SODIUM SUCCINATE 125 MG: 125 INJECTION, POWDER, FOR SOLUTION INTRAMUSCULAR; INTRAVENOUS at 08:05

## 2022-05-17 RX ADMIN — SUCCINYLCHOLINE CHLORIDE 120 MG: 20 INJECTION, SOLUTION INTRAMUSCULAR; INTRAVENOUS at 04:05

## 2022-05-17 RX ADMIN — ENOXAPARIN SODIUM 40 MG: 100 INJECTION SUBCUTANEOUS at 08:05

## 2022-05-17 RX ADMIN — FUROSEMIDE 40 MG: 10 INJECTION, SOLUTION INTRAMUSCULAR; INTRAVENOUS at 08:05

## 2022-05-17 NOTE — PLAN OF CARE
Patient arrived to unit for C10D15 Gemzar. Pt lethargic, ill appearing, and wife reports having trouble walking short distances and has had nothing to eat today. Pulse Ox 79 %. Applied 2L O2 per n/c. Pt with shallow breaths. Encouraged pt to deep breathe through nose. Pulse Ox 85%. Increased 02to 5L per n/c and sats at 93%. Portable O2 obtained. Transferred pt to ED on 6L O2 per n/c. Wife notified. Dr. Hathaway notified of transfer.

## 2022-05-17 NOTE — ED NOTES
While in another room, I heard pt screaming and yelling. Left to check on pt. Upon entering pts room, pt appeared blue in the face/lips and was pulling his non-rebreather mask off. Pt was in obvious distress, O2 sat 68%. Immediately placed non-rebreather back on pt and went to alert MD. MD Martinss quick to bedside, along with other nurses. Pt prepared to be intubated.

## 2022-05-17 NOTE — PHARMACY MED REC
"Admission Medication History     The home medication history was taken by Yari Esquivel CPhT.    You may go to "Admission" then "Reconcile Home Medications" tabs to review and/or act upon these items.      The home medication list has been updated by the Pharmacy department.    Please read ALL comments highlighted in yellow.    Please address this information as you see fit.     Feel free to contact us if you have any questions or require assistance.      Medications listed below were obtained from: Patient/family and Analytic software- [employer]  (Not in a hospital admission)      Potential issues to be addressed PRIOR TO DISCHARGE  Patient reported not taking the following medications: (Lidocaine-prilocaine (EMLA) cream; ondansetron (Zofran) 8 mg tab). These medications remain on the home medication list. Please address accordingly.     Spoke with the wife of patient via telephone and she was able to verify patient medications.  Stated that patient no longer takes EMLA cream and Zofran tablet.    Yari Esquivel CPhT.  [phone]                    .          "

## 2022-05-17 NOTE — ED NOTES
RN having eMAR difficulty, spoke to charge rn ana regarding linked orders and override meds. RN to call pharmacy for assistance. RN spoke to pharmacist Beverly regarding linked and over ridden fentanyl orders. Rn clarified that 200mcg total was pulled, 150mcg given and 50mcg wasted in pyxis but linked orders are not matching as MAR is showing that dose was double given when linked with either order. Pharmacist instruction to unlink order and leave as is.

## 2022-05-17 NOTE — HPI
"Mr. 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  He is followed by Dr. Hathaway in Oncology, last seeing him in clinic on 4/29.22.  At that time he noted, "On 8/30/2019 he underwent bronchoscopy that showed 'a partially obstructing (about 80% obstructed) mass was found in the middle portion in the left mainstem bronchus. The mass was large and bloody, circumferential, endobronchial, friable and necrotic. 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  Completed 4 cycles and then 2/10/2021 scans showed progression.  2/24/2021 he was subsequent started on gemcitabine with Dr. Cruz."    Other medical issues include SCHF, macrocytic anemia, bronchiectasis, NSTEMI with non-obstructive CAD on [facility] (follows with Dr. Roman), MAT, and PAD.  His last TTE was on 2/23/22, and showed an estimated ejection fraction of 35-40%, normal RV systolic function (estimated PA systolic pressure is greater than 22 mmHg), and grade I left ventricular diastolic dysfunction.    He now comes to the ED after developing acute SOB today.  His wife is at the bedside and assists with the history.  Apparently he awoke this morning for chemo and immediately became very anxious and had, "a panic attack, like he always does on chemo days."  His wife settled him down and he improved enough to be driven to  Onc.  On walking from the parking lot to the Onc clinic he was severely dyspneic.  He was found in clinic to have an O2 sat of 85%, so he was referred to the ED.  He denies fever or chills, but does report some cough and leg swelling.  He denies N/V/D.    In the ED his VS's showed BP 90/59-->130/79 (BP Location: Left arm, Patient Position: Lying)   Pulse 111-->66   Temp Tm 97.7F, Tc 97.6 °F (36.4 °C) (Axillary)   Resp 18   " "Ht 5' 10" (1.778 m)   Wt 95.4 kg (210 lb 5.1 oz)   SpO2 87%-->100%  BMI 30.18 kg/m².  Labs showed WBC 13.6, PLT clumped, D-dimer 0.84, Na 135, Cr 0.9, gluc 146, TB 1.3, AT 42, LA 8.7-->4, BNP 1051, Trop 0.016, COVID negative and Flu negative.  Initial ABG showed pH 7.26, PCO2 54, PAO2 286 with follow up VBG showing pH 7.38 and PCO2 of 42.  EKG showed NSR 73, Normal sinus rhythm, incomplete right bundle branch block, and nonspecific ST and T wave abnormality.    CTA chest and CT abd/pelvis showed no evidence of pulmonary embolism.  There was a moderate pleural effusion on the right and mild pleural effusion on the left.  There is associated bilateral atelectasis and mild ground-glass infiltrate or edema.  There was airspace disease in left upper lobe and superior left lower lobe with slight nodular components.  This could represent neoplasm.  There was scarring, pneumonitis and or post treatment changes not excluded.  There was mild perinephric stranding right greater than left with minimal wall thickening of the renal pelvis on the right.  There was nondistention of the descending and sigmoid colon.  Minimal wall thickening is not excluded.    In the ED he was treated with ASA 325mg 1504, LR 1L 1618, Vanco 2g 1735.  Due to respiratory distress, he underwent RSI and was placed on the vent - 8.0 ETT secured 24 cm @ lips. Pt was placed on PRVC: 20/450/+5/80% per ER settings. OGT was placed and confirmed in the stomach by KUB.  Shortly after arrival to the ICU room, he self-extubated and ripped out his OGT.  He was placed on BiPAP, which he refused, then placed on Venti mask.     "

## 2022-05-17 NOTE — CARE UPDATE
Pt received intubated with 8.0 ETT secured 24 cm @ lips. Pt was placed on PRVC: 20/450/+5/80% per ER settings, ABG obtained on arrival. Will continue to monitor.

## 2022-05-17 NOTE — ED PROVIDER NOTES
Encounter Date: 5/17/2022       History     Chief Complaint   Patient presents with    Shortness of Breath     Pt was in clinic to do some type of infusion and staff noticed his SPO2 was low so they sent him to ED. Pt has no new acute complaints and says he does not feel SOB. Pt is on O2 from clinic and SPO2 is 93% in triage.        Patient presents to the ED after being sent from chemotherapy infusion unit for severe shortness of breath.  Patient has past medical history of metastatic lung cancer and is receiving chemotherapy at this hospital.  He does not have any baseline oxygen requirements but did note increasing shortness of breath over the last day.  He denies any associated chest pain with his increased shortness of breath.  Upon arriving for his chemotherapy infusion his port was accessed and the patient was noted to be dyspneic.  They did not note nor was there reported SpO2 in the infusion suite.  However the patient was sent emergently to the emergency department for further evaluation of his shortness of breath upon initial evaluation the patient's saturations in the high 80s to low 90s on room air and did not significantly improve after is a him on supplemental O2.  Patient continues to deny any chest pain but does say he is slightly short of breath.  He denies any recent leg swelling and states he is not on any blood thinner that he is aware of.          Review of patient's allergies indicates:  No Known Allergies  Past Medical History:   Diagnosis Date    Hypertension     Lung cancer     NSCLC    Lung mass     left lung     Past Surgical History:   Procedure Laterality Date    BRONCHOSCOPY N/A 8/30/2019    Procedure: Bronchoscopy;  Surgeon: Miguel Diagnostic Provider;  Location: [facility] OR [address] [place] [address];  Service: Anesthesiology;  Laterality: N/A;    CORONARY ANGIOGRAPHY N/A 3/4/2022    Procedure: ANGIOGRAM, CORONARY ARTERY;  Surgeon: Robson Green MD;  Location: [facility] CATH LAB;  Service: Cardiology;   Laterality: N/A;     Family History   Problem Relation Age of Onset    Diabetes Mother     Heart defect Mother     Cancer Father     Cancer Sister     No Known Problems Son      Social History     Tobacco Use    Smoking status: Former Smoker     Packs/day: 1.00     Years: 25.00     Pack years: 25.00     Types: Cigarettes     Quit date:      Years since quittin.3    Smokeless tobacco: Never Used   Substance Use Topics    Alcohol use: Yes     Comment: ocassionally    Drug use: Never     Review of Systems   Constitutional: Negative for chills and fever.   HENT: Negative for facial swelling.    Eyes: Negative for redness.   Respiratory: Positive for shortness of breath. Negative for cough.    Cardiovascular: Negative for chest pain and leg swelling.   Gastrointestinal: Negative for vomiting.   Musculoskeletal: Negative for gait problem.   Skin: Negative for pallor.   Neurological: Negative for facial asymmetry.   Psychiatric/Behavioral: Negative for confusion.   All other systems reviewed and are negative.      Physical Exam     Initial Vitals [22 1357]   BP Pulse Resp Temp SpO2   (!) 188/111 68 20 97.7 °F (36.5 °C) (!) 93 %      MAP       --         Physical Exam    Nursing note and vitals reviewed.  Constitutional: He appears well-developed and well-nourished. He is not diaphoretic.   Initial evaluation revealed white male who is mildly dyspneic with an SpO2 of 93% on room air   HENT:   Head: Normocephalic and atraumatic.   Eyes: EOM are normal.   Neck: Neck supple.   Normal range of motion.  Pulmonary/Chest: No respiratory distress. He has no wheezes. He has no rhonchi. He has no rales.   No wheezes and no rhonchi   Abdominal: Abdomen is soft. He exhibits no distension. There is no rebound.   Musculoskeletal:         General: No edema. Normal range of motion.      Cervical back: Normal range of motion and neck supple.     Neurological: He is alert and oriented to person, place, and time.    Skin: Skin is warm and dry.   Psychiatric: He has a normal mood and affect.         ED Course   Procedures  Labs Reviewed   CBC W/ AUTO DIFFERENTIAL - Abnormal; Notable for the following components:       Result Value    WBC 13.60 (*)     RBC 4.38 (*)     MCH 32.2 (*)     RDW 17.5 (*)     Gran # (ANC) 11.4 (*)     Gran % 84.1 (*)     Lymph % 7.4 (*)     Platelet Estimate Clumped (*)     All other components within normal limits   COMPREHENSIVE METABOLIC PANEL - Abnormal; Notable for the following components:    Sodium 135 (*)     CO2 19 (*)     Glucose 146 (*)     Total Bilirubin 1.3 (*)     AST 42 (*)     All other components within normal limits   B-TYPE NATRIURETIC PEPTIDE - Abnormal; Notable for the following components:    BNP 1,051 (*)     All other components within normal limits   D DIMER, QUANTITATIVE - Abnormal; Notable for the following components:    D-Dimer 0.84 (*)     All other components within normal limits   ISTAT PROCEDURE - Abnormal; Notable for the following components:    POC PH 7.261 (*)     POC PCO2 54.0 (*)     POC PO2 286 (*)     All other components within normal limits   CULTURE, BLOOD   CULTURE, BLOOD   TROPONIN I   POCT INFLUENZA A/B MOLECULAR   SARS-COV-2 RDRP GENE     EKG Readings: (Independently Interpreted)   Initial Reading: No STEMI.   No acute ischemic changes.       Imaging Results          X-Ray Chest AP Portable (Final result)  Result time 05/17/22 17:11:55    Final result by Abelino Finley MD (05/17/22 17:11:55)                 Impression:      No significant change.      Electronically signed by: Abelino Finley MD  Date:    05/17/2022  Time:    17:11             Narrative:    EXAMINATION:  XR CHEST AP PORTABLE    CLINICAL HISTORY:  Hypoxemia    TECHNIQUE:  Single frontal view of the chest was performed.    COMPARISON:  14:58. CTA chest 15:41.    FINDINGS:  Presumed ET tube is visualized approximately 7 cm above the jonnathan.  No significant change in cardiopulmonary  status from earlier chest radiograph and CT.  Bilateral pleural effusions are seen.  Stable area of consolidation is seen in the left mid lung zone with bibasilar atelectasis.  No pneumothorax.                               X-Ray Abdomen Portable (Final result)  Result time 05/17/22 17:13:01   Procedure changed from X-Ray Abdomen AP 1 View (KUB)     Final result by Abelino Finley MD (05/17/22 17:13:01)                 Impression:      As above.      Electronically signed by: Abelino Finley MD  Date:    05/17/2022  Time:    17:13             Narrative:    EXAMINATION:  XR ABDOMEN PORTABLE    CLINICAL HISTORY:  Encounter for orogastric (OG) tube placement; Encounter for fitting and adjustment of other gastrointestinal appliance and device    TECHNIQUE:  AP View(s) of the abdomen was performed.    COMPARISON:  CT abdomen and pelvis from the same date.    FINDINGS:  Enteric tube extends well below the diaphragm into the stomach.  There is prominent gaseous distention seen of the stomach, increased from earlier CT.                                CTA Chest Non-Coronary (PE Study) (Final result)  Result time 05/17/22 16:53:01    Final result by Renato Elizabeth MD (05/17/22 16:53:01)                 Impression:      1. No evidence of pulmonary embolism.  Limited visualization.  See above comments.  2. Moderate pleural effusion on the right and mild pleural effusion on the left.  There is associated bilateral atelectasis and mild ground-glass infiltrate or edema.  Airspace disease in left upper lobe and superior left lower lobe with slight nodular components.  This could represent neoplasm..  Scarring, pneumonitis and or post treatment changes not excluded.  See above comments.  Follow-up recommended.  3. Mild perinephric stranding right greater than left with minimal wall thickening of the renal pelvis on the right.  Mild inflammatory or infectious process is not excluded.  4. Nondistention of the descending and sigmoid  colon.  Minimal wall thickening is not excluded.  5.  This report was flagged in Epic as abnormal.      Electronically signed by: Renato Lewis  Date:    05/17/2022  Time:    16:53             Narrative:    EXAMINATION:  CT ABDOMEN PELVIS WITH CONTRAST; CTA CHEST NON CORONARY    CLINICAL HISTORY:  Small cell lung cancer (SCLC), assess treatment response;; Pulmonary embolism (PE) suspected, unknown D-dimer; .  Emergency CT    TECHNIQUE:  Low dose axial images, sagittal and coronal reformations were obtained from the lung bases to the pubic symphysis following the IV administration of 100 mL of Omnipaque 350    Please note a separate study a CTA of the chest is included with the CT abdomen and pelvis study    COMPARISON:  02/22/2022, 01/05/2022    FINDINGS:  Abdomen:    -    - Liver: No focal mass.    - Gallbladder: No calcified gallstones.    - Bile Ducts: No evidence of intra or extra hepatic biliary ductal dilation.    - Spleen: Negative.    - Kidneys: Kidneys appear normal size.  Kidneys enhance normally.  No stones bilaterally.  No hydronephrosis or obstruction bilaterally.  Mild perinephric and periureteral stranding bilaterally right greater than left.  Minimal wall thickening of the renal pelvis on the right.  Mild inflammatory or infectious process is not excluded.  Follow-up recommended.    - Adrenals: Unremarkable.    - Pancreas: No mass or peripancreatic fat stranding.    - Retroperitoneum:  No significant adenopathy.    - Vascular: Mild atherosclerotic change and ectasia of the distal abdominal aorta.  Maximum diameter is 2.7 cm.    - Abdominal wall:  Unremarkable.    Pelvis:    No pelvic mass, adenopathy, or free fluid.    Bowel/Mesentery:    No evidence of bowel obstruction.  Nondistention of the descending and sigmoid colon.  Minimal wall thickening is not excluded.  Retained feces in the proximal colon.    No evidence of appendicitis.    Facet arthropathy at L4-5 on the left.    Bones:  No acute  osseous abnormality and no suspicious lytic or blastic lesion.    Chest:    Pulmonary arteries are suboptimally enhanced.  No filling defects are identified to indicate pulmonary embolism.  Limited visualization of subsegmental pulmonary arteries bilaterally.    Moderate pleural effusion on the right and mild pleural effusion on the left posteriorly.  Right lower lobe atelectasis.    Mild anterior left upper lobe complex fluid, thickening or low-density mass axial 181 of series 2, similar to the prior study.  Mild left upper lobe atelectasis.  Small 1.8 cm left upper lobe nodular focus on axial 133.    Superior left lower lobe posteromedial focal airspace disease and mild consolidation on axial 157, coronal 164 of series 601 and sagittal 334 series 602.  This could represent neoplasm or infection.  Follow-up recommended.    Mild ground-glass changes bilaterally may be associated with mild edema pneumonitis.                                CT Abdomen Pelvis With Contrast (Final result)  Result time 05/17/22 16:53:01    Final result by Renato Elizabeth MD (05/17/22 16:53:01)                 Impression:      1. No evidence of pulmonary embolism.  Limited visualization.  See above comments.  2. Moderate pleural effusion on the right and mild pleural effusion on the left.  There is associated bilateral atelectasis and mild ground-glass infiltrate or edema.  Airspace disease in left upper lobe and superior left lower lobe with slight nodular components.  This could represent neoplasm..  Scarring, pneumonitis and or post treatment changes not excluded.  See above comments.  Follow-up recommended.  3. Mild perinephric stranding right greater than left with minimal wall thickening of the renal pelvis on the right.  Mild inflammatory or infectious process is not excluded.  4. Nondistention of the descending and sigmoid colon.  Minimal wall thickening is not excluded.  5.  This report was flagged in Epic as  abnormal.      Electronically signed by: Renato Lewis  Date:    05/17/2022  Time:    16:53             Narrative:    EXAMINATION:  CT ABDOMEN PELVIS WITH CONTRAST; CTA CHEST NON CORONARY    CLINICAL HISTORY:  Small cell lung cancer (SCLC), assess treatment response;; Pulmonary embolism (PE) suspected, unknown D-dimer; .  Emergency CT    TECHNIQUE:  Low dose axial images, sagittal and coronal reformations were obtained from the lung bases to the pubic symphysis following the IV administration of 100 mL of Omnipaque 350    Please note a separate study a CTA of the chest is included with the CT abdomen and pelvis study    COMPARISON:  02/22/2022, 01/05/2022    FINDINGS:  Abdomen:    -    - Liver: No focal mass.    - Gallbladder: No calcified gallstones.    - Bile Ducts: No evidence of intra or extra hepatic biliary ductal dilation.    - Spleen: Negative.    - Kidneys: Kidneys appear normal size.  Kidneys enhance normally.  No stones bilaterally.  No hydronephrosis or obstruction bilaterally.  Mild perinephric and periureteral stranding bilaterally right greater than left.  Minimal wall thickening of the renal pelvis on the right.  Mild inflammatory or infectious process is not excluded.  Follow-up recommended.    - Adrenals: Unremarkable.    - Pancreas: No mass or peripancreatic fat stranding.    - Retroperitoneum:  No significant adenopathy.    - Vascular: Mild atherosclerotic change and ectasia of the distal abdominal aorta.  Maximum diameter is 2.7 cm.    - Abdominal wall:  Unremarkable.    Pelvis:    No pelvic mass, adenopathy, or free fluid.    Bowel/Mesentery:    No evidence of bowel obstruction.  Nondistention of the descending and sigmoid colon.  Minimal wall thickening is not excluded.  Retained feces in the proximal colon.    No evidence of appendicitis.    Facet arthropathy at L4-5 on the left.    Bones:  No acute osseous abnormality and no suspicious lytic or blastic lesion.    Chest:    Pulmonary  arteries are suboptimally enhanced.  No filling defects are identified to indicate pulmonary embolism.  Limited visualization of subsegmental pulmonary arteries bilaterally.    Moderate pleural effusion on the right and mild pleural effusion on the left posteriorly.  Right lower lobe atelectasis.    Mild anterior left upper lobe complex fluid, thickening or low-density mass axial 181 of series 2, similar to the prior study.  Mild left upper lobe atelectasis.  Small 1.8 cm left upper lobe nodular focus on axial 133.    Superior left lower lobe posteromedial focal airspace disease and mild consolidation on axial 157, coronal 164 of series 601 and sagittal 334 series 602.  This could represent neoplasm or infection.  Follow-up recommended.    Mild ground-glass changes bilaterally may be associated with mild edema pneumonitis.                               X-Ray Chest 1 View (Final result)  Result time 05/17/22 15:24:49   Procedure changed from X-Ray Chest PA And Lateral     Final result by Renato Elizabeth MD (05/17/22 15:24:49)                 Impression:      Suboptimal inspiration with interval increased density at the lung bases could be associated with infiltrate/pneumonitis, edema and or layering effusions.    Stable left upper lobe appearance.  See above comments.  Follow-up recommended.      Electronically signed by: Renato Elizabeth  Date:    05/17/2022  Time:    15:24             Narrative:    EXAMINATION:  XR CHEST 1 VIEW    CLINICAL HISTORY:  Chest Pain;    TECHNIQUE:  Single frontal view of the chest was performed.    COMPARISON:  03/01/2022    FINDINGS:  Port catheter on the right.    Suboptimal inspiration limits characterization.    Bibasilar hazy density may be associated with layering effusion, edema, ground-glass infiltrate and or atelectasis.    Stable left upper lobe interstitial and ground-glass changes.  Possible left upper lobe suprahilar mass.                                 Medications    piperacillin-tazobactam 4.5 g in dextrose 5 % 100 mL IVPB (ready to mix system) ( Intravenous Verify Only 5/17/22 2200)   furosemide injection 40 mg (40 mg Intravenous Given 5/17/22 2024)   sodium chloride 0.9% flush 10 mL (has no administration in time range)   naloxone 0.4 mg/mL injection 0.02 mg (has no administration in time range)   enoxaparin injection 40 mg (40 mg Subcutaneous Given 5/17/22 2024)   ondansetron injection 4 mg (has no administration in time range)   prochlorperazine injection Soln 5 mg (has no administration in time range)   albuterol-ipratropium 2.5 mg-0.5 mg/3 mL nebulizer solution 3 mL (has no administration in time range)   0.9%  NaCl infusion ( Intravenous Stopped 5/17/22 2022)   aspirin chewable tablet 81 mg (has no administration in time range)   atorvastatin tablet 80 mg (has no administration in time range)   clopidogreL tablet 75 mg (has no administration in time range)   acetaminophen tablet 650 mg (has no administration in time range)   aluminum-magnesium hydroxide-simethicone 200-200-20 mg/5 mL suspension 30 mL (has no administration in time range)   senna-docusate 8.6-50 mg per tablet 1 tablet (has no administration in time range)   simethicone chewable tablet 80 mg (has no administration in time range)   influenza (QUADRIVALENT PF) vaccine 0.5 mL (has no administration in time range)   predniSONE tablet 40 mg (has no administration in time range)   azithromycin 500 mg in dextrose 5 % 250 mL IVPB (ready to mix system) (has no administration in time range)   vancomycin - pharmacy to dose (has no administration in time range)   albuterol-ipratropium 2.5 mg-0.5 mg/3 mL nebulizer solution 3 mL (has no administration in time range)   vancomycin (VANCOCIN) 1,750 mg in dextrose 5 % 500 mL IVPB (1,750 mg Intravenous Trough Due As Scheduled Before Dose 5/19/22 0430)   aspirin tablet 325 mg (325 mg Oral Given 5/17/22 1504)   iohexoL (OMNIPAQUE 300) injection 100 mL (100 mLs Intravenous  Given 5/17/22 1545)   ketamine injection 150 mg (150 mg Intravenous Given by Other 5/17/22 1618)   succinylcholine injection 120 mg (120 mg Intravenous Given 5/17/22 1626)   vancomycin (VANCOCIN) 2,000 mg in dextrose 5 % 500 mL IVPB (0 mg Intravenous Stopped 5/17/22 1935)   fentaNYL (SUBLIMAZE) 50 mcg/mL injection (100 mcg Intravenous Given by Other 5/17/22 1705)   fentaNYL 50 mcg/mL injection 100 mcg (100 mcg Intravenous Given 5/17/22 1715)   fentaNYL 50 mcg/mL injection 50 mcg (50 mcg Intravenous Given by Other 5/17/22 1636)   lactated ringers bolus 1,000 mL (0 mLs Intravenous Stopped 5/17/22 1821)   methylPREDNISolone sodium succinate injection 125 mg (125 mg Intravenous Given 5/17/22 2025)     Medical Decision Making:   Initial Assessment:   Patient presenting with dyspnea and hypoxemia with past medical history of lung cancer and no previous supplemental oxygen requirement.  Differential Diagnosis:   Pneumonia, pulmonary embolism, heart attack, heart failure, worsening lung cancer, hypercapnia, hypoxemia, respiratory failure not otherwise specified.  ED Management:    Although the patient's initial medical evaluation did not reveal significant or severe respiratory distress, he clinically deteriorated and required intubation.  He initially was placed on supplemental oxygen but became more agitated as his oxygen saturations decline and could not keep his oxygen mass on his face.  Please see independent procedure note.    CT PE was negative for acute pulmonary embolism the patient was empirically treated for pneumonia and admitted to the ICU.    Luis Hill MD,   Emergency Medicine  05/17/2022 10:58 PM    (This note was written with voice recognition software.  Please excuse any grammatical errors.)             Attending Attestation:         Attending Critical Care:   Critical Care Times:   Direct Patient Care (initial evaluation, reassessments, and time considering the  case)................................................................20 minutes.   Additional History from reviewing old medical records or taking additional history from the family, EMS, PCP, etc.......................5 minutes.   Ordering, Reviewing, and Interpreting Diagnostic Studies...............................................................................................................5 minutes.   Documentation..................................................................................................................................................................................5 minutes.   Consultation with other Physicians. .................................................................................................................................................5 minutes.   Consultation with the patient's family directly relating to the patient's condition, care, and DNR status (when patient unable)......5 minutes.   ==============================================================  · Total Critical Care Time - exclusive of procedural time: 45 minutes.  ==============================================================          ED Course as of 05/17/22 2300   Tue May 17, 2022   1559 D-Dimer(!): 0.84 [CH]   1632 Patient was emergently intubated for persistent hypoxemia and inability to maintain his non-rebreather in place due to increased anxiety.  He was initially given 150 mg of ketamine and was subsequently preoxygenated while sedated with ketamine.  He was then intubated with 1st pass success by resident MD under my direct supervision.   His PE study is still pending.  Although the patient has been afebrile and did not report a productive cough, he is severely hypoxemia and on chemo, therefore I will empirically cover for pneumonia and blood cultures and broad-spectrum antibiotics have been ordered.  He will be admitted to the ICU. [CH]   1701 Case discussed with the ICU Dr. Nayak, who is  accepted the patient to a nursing informed me that patient is fighting the vent.  Will give additional analgesia with fentanyl in addition to the already ordered propofol [CH]      ED Course User Index  [CH] Luis Hill MD             Clinical Impression:   Final diagnoses:  [R07.9] Chest pain  [R09.02] Hypoxic  [Z46.59] Encounter for orogastric (OG) tube placement          ED Disposition Condition    Admit               Luis Hill MD  05/17/22 9210

## 2022-05-17 NOTE — ED NOTES
Pt currently on 40% venti mask currently. Pt O2 sats dropping to high 80's. Notified MD who instructed to place pt on non-rebreather which brought O2 back to 90's.

## 2022-05-17 NOTE — ED TRIAGE NOTES
Pt presents to ED via personal transportation with complaints of SOB and hypertension. Pt explains that he was at the infusion clinic, when they checked his pulse ox and it was low. Pt was placed on 8L via nasal cannula and brought to our ER via the clinic nurse. Pt has no complaints at this time other than SOB. Pt denies chest pain, abdominal pain, n/v/d. Pt does have hypertension and currently has lung cancer. Pt placed on non-rebreather mask to maintain O2 sats above 93%. Pt placed on cardiac monitor and continuous pulse ox. Pt AAOX4.

## 2022-05-17 NOTE — SUBJECTIVE & OBJECTIVE
Past Medical History:   Diagnosis Date    Hypertension     Lung cancer     NSCLC    Lung mass     left lung       Past Surgical History:   Procedure Laterality Date    BRONCHOSCOPY N/A 8/30/2019    Procedure: Bronchoscopy;  Surgeon: Miguel Diagnostic Provider;  Location: [facility] OR [address] [place] [address];  Service: Anesthesiology;  Laterality: N/A;    CORONARY ANGIOGRAPHY N/A 3/4/2022    Procedure: ANGIOGRAM, CORONARY ARTERY;  Surgeon: Robson Green MD;  Location: [facility] CATH LAB;  Service: Cardiology;  Laterality: N/A;       Review of patient's allergies indicates:  No Known Allergies    No current facility-administered medications on file prior to encounter.     Current Outpatient Medications on File Prior to Encounter   Medication Sig    amLODIPine (NORVASC) 10 MG tablet Take 1 tablet (10 mg total) by mouth once daily.    ascorbic acid-multivit-min (VITAMIN C ENERGY BOOSTER) 1,000 mg PwEP Take by mouth. Patient takes 3,000 mg per day    aspirin 81 MG Chew Take 1 tablet (81 mg total) by mouth once daily.    atenoloL (TENORMIN) 100 MG tablet Take 1 tablet (100 mg total) by mouth once daily.    atorvastatin (LIPITOR) 80 MG tablet Take 1 tablet (80 mg total) by mouth once daily.    clopidogreL (PLAVIX) 75 mg tablet Take 1 tablet (75 mg total) by mouth once daily.    furosemide (LASIX) 40 MG tablet Take 1 tablet (40 mg total) by mouth once daily.    losartan (COZAAR) 100 MG tablet Take 1 tablet (100 mg total) by mouth once daily.    LIDOcaine-prilocaine (EMLA) cream Apply generously to port site 30-60 min prior to chemo and then cover with saran wrap. (Patient not taking: Reported on 5/17/2022)    ondansetron (ZOFRAN) 8 MG tablet Take 1 tablet (8 mg total) by mouth 4 (four) times daily as needed for Nausea. (Patient not taking: Reported on 5/17/2022)     Family History       Problem Relation (Age of Onset)    Cancer Father, Sister    Diabetes Mother    Heart defect Mother    No Known Problems Son          Tobacco Use    Smoking status:  Former Smoker     Packs/day: 1.00     Years: 25.00     Pack years: 25.00     Types: Cigarettes     Quit date:      Years since quittin.3    Smokeless tobacco: Never Used   Substance and Sexual Activity    Alcohol use: Yes     Comment: ocassionally    Drug use: Never    Sexual activity: Yes     Partners: Female     Review of Systems   Constitutional:  Positive for activity change and fatigue. Negative for fever.   HENT:  Negative for congestion.    Eyes:  Negative for photophobia.   Respiratory:  Positive for cough and shortness of breath. Negative for wheezing.    Cardiovascular:  Positive for leg swelling. Negative for chest pain.   Gastrointestinal:  Negative for diarrhea, nausea and vomiting.   Endocrine: Negative for cold intolerance.   Genitourinary:  Negative for dysuria.   Musculoskeletal:  Negative for myalgias.   Skin:  Negative for wound.   Allergic/Immunologic: Positive for immunocompromised state.   Neurological:  Negative for light-headedness.   Hematological:  Does not bruise/bleed easily.   Psychiatric/Behavioral:  The patient is nervous/anxious.    All other systems reviewed and are negative.  Objective:     Vital Signs (Most Recent):  Temp: 97.8 °F (36.6 °C) (22 190)  Pulse: 72 (22)  Resp: (!) 32 (22)  BP: (!) 157/84 (22)  SpO2: (!) 94 % (22) Vital Signs (24h Range):  Temp:  [97.6 °F (36.4 °C)-97.8 °F (36.6 °C)] 97.8 °F (36.6 °C)  Pulse:  [] 72  Resp:  [9-42] 32  SpO2:  [82 %-100 %] 94 %  BP: ()/() 157/84     Weight: 95.4 kg (210 lb 5.1 oz)  Body mass index is 30.18 kg/m².    Physical Exam  Constitutional:       General: He is awake. He is not in acute distress.     Appearance: He is ill-appearing. He is not toxic-appearing or diaphoretic.   HENT:      Head: Normocephalic and atraumatic.   Eyes:      Conjunctiva/sclera:      Right eye: Right conjunctiva is not injected.      Left eye: Left conjunctiva is not injected.    Neck:      Thyroid: No thyromegaly.   Cardiovascular:      Rate and Rhythm: Normal rate and regular rhythm.      Heart sounds: No murmur heard.  Pulmonary:      Effort: Pulmonary effort is normal. No tachypnea or bradypnea.      Breath sounds: Rales present. No decreased breath sounds, wheezing or rhonchi.   Chest:      Comments: Right port-a-cath in place  Abdominal:      General: Abdomen is flat. Bowel sounds are normal. There is no distension.      Palpations: Abdomen is soft.      Tenderness: There is no abdominal tenderness.   Genitourinary:     Comments: Lemon in place with clear yellow urine  Musculoskeletal:      Cervical back: Full passive range of motion without pain.   Lymphadenopathy:      Comments: Trace edema to both legs   Skin:     Findings: No lesion or rash.   Neurological:      General: No focal deficit present.      Mental Status: He is oriented to person, place, and time. He is lethargic.      GCS: GCS eye subscore is 4. GCS verbal subscore is 5. GCS motor subscore is 6.   Psychiatric:         Attention and Perception: Perception normal. He is inattentive.         Mood and Affect: Mood is depressed. Affect is flat.         Speech: Speech normal.         Behavior: Behavior is cooperative.         Cognition and Memory: Cognition and memory normal.           Significant Labs:   Recent Results (from the past 24 hour(s))   CBC auto differential    Collection Time: 05/17/22  2:28 PM   Result Value Ref Range    WBC 13.60 (H) 3.90 - 12.70 K/uL    RBC 4.38 (L) 4.60 - 6.20 M/uL    Hemoglobin 14.1 14.0 - 18.0 g/dL    Hematocrit 41.7 40.0 - 54.0 %    MCV 95 82 - 98 fL    MCH 32.2 (H) 27.0 - 31.0 pg    MCHC 33.8 32.0 - 36.0 g/dL    RDW 17.5 (H) 11.5 - 14.5 %    Platelets SEE COMMENT 150 - 450 K/uL    MPV SEE COMMENT 9.2 - 12.9 fL    Immature Granulocytes 0.3 0.0 - 0.5 %    Gran # (ANC) 11.4 (H) 1.8 - 7.7 K/uL    Immature Grans (Abs) 0.04 0.00 - 0.04 K/uL    Lymph # 1.0 1.0 - 4.8 K/uL    Mono # 1.0 0.3 -  1.0 K/uL    Eos # 0.0 0.0 - 0.5 K/uL    Baso # 0.07 0.00 - 0.20 K/uL    nRBC 0 0 /100 WBC    Gran % 84.1 (H) 38.0 - 73.0 %    Lymph % 7.4 (L) 18.0 - 48.0 %    Mono % 7.6 4.0 - 15.0 %    Eosinophil % 0.1 0.0 - 8.0 %    Basophil % 0.5 0.0 - 1.9 %    Platelet Estimate Clumped (A)     Differential Method Automated    Comprehensive metabolic panel    Collection Time: 05/17/22  2:28 PM   Result Value Ref Range    Sodium 135 (L) 136 - 145 mmol/L    Potassium 4.1 3.5 - 5.1 mmol/L    Chloride 103 95 - 110 mmol/L    CO2 19 (L) 23 - 29 mmol/L    Glucose 146 (H) 70 - 110 mg/dL    BUN 9 8 - 23 mg/dL    Creatinine 0.9 0.5 - 1.4 mg/dL    Calcium 9.2 8.7 - 10.5 mg/dL    Total Protein 7.9 6.0 - 8.4 g/dL    Albumin 3.9 3.5 - 5.2 g/dL    Total Bilirubin 1.3 (H) 0.1 - 1.0 mg/dL    Alkaline Phosphatase 101 55 - 135 U/L    AST 42 (H) 10 - 40 U/L    ALT 22 10 - 44 U/L    Anion Gap 13 8 - 16 mmol/L    eGFR if African American >60 >60 mL/min/1.73 m^2    eGFR if non African American >60 >60 mL/min/1.73 m^2   Troponin I #1    Collection Time: 05/17/22  2:28 PM   Result Value Ref Range    Troponin I 0.016 0.000 - 0.026 ng/mL   B-Type natriuretic peptide (BNP)    Collection Time: 05/17/22  2:28 PM   Result Value Ref Range    BNP 1,051 (H) 0 - 99 pg/mL   D dimer, quantitative    Collection Time: 05/17/22  2:28 PM   Result Value Ref Range    D-Dimer 0.84 (H) <0.50 mg/L FEU   POCT Influenza A/B Molecular    Collection Time: 05/17/22  3:42 PM   Result Value Ref Range    POC Molecular Influenza A Ag Negative Negative, Not Reported    POC Molecular Influenza B Ag Negative Negative, Not Reported     Acceptable Yes    POCT COVID-19 Rapid Screening    Collection Time: 05/17/22  4:59 PM   Result Value Ref Range    POC Rapid COVID Negative Negative     Acceptable Yes    ISTAT PROCEDURE    Collection Time: 05/17/22  5:02 PM   Result Value Ref Range    POC PH 7.261 (LL) 7.35 - 7.45    POC PCO2 54.0 (H) 35 - 45 mmHg    POC PO2  286 (H) 80 - 100 mmHg    POC HCO3 24.3 24 - 28 mmol/L    POC BE -3 -2 to 2 mmol/L    POC SATURATED O2 100 95 - 100 %    POC TCO2 26 23 - 27 mmol/L    Sample ARTERIAL     Site RR     Allens Test Pass     DelSys Adult Vent     Mode AC/PRVC     Vt 450    Lactic acid, plasma    Collection Time: 05/17/22  5:16 PM   Result Value Ref Range    Lactate (Lactic Acid) 4.0 (HH) 0.5 - 2.2 mmol/L   Procalcitonin    Collection Time: 05/17/22  5:16 PM   Result Value Ref Range    Procalcitonin 0.05 <0.25 ng/mL   ISTAT PROCEDURE    Collection Time: 05/17/22  6:27 PM   Result Value Ref Range    POC PH 7.379 7.35 - 7.45    POC PCO2 41.9 35 - 45 mmHg    POC PO2 90 (HH) 40 - 60 mmHg    POC HCO3 24.7 24 - 28 mmol/L    POC BE -1 -2 to 2 mmol/L    POC SATURATED O2 97 95 - 100 %    POC TCO2 26 24 - 29 mmol/L    Rate 20     Sample VENOUS     Site LR     Allens Test N/A     DelSys Adult Vent     Mode AC/PRVC     Vt 450     PEEP 5     FiO2 80     Sp02 100    Troponin I #2    Collection Time: 05/17/22  6:36 PM   Result Value Ref Range    Troponin I 0.043 (H) 0.000 - 0.026 ng/mL   ISTAT PROCEDURE    Collection Time: 05/17/22  8:32 PM   Result Value Ref Range    POC PH 7.427 7.35 - 7.45    POC PCO2 37.5 35 - 45 mmHg    POC PO2 51 (LL) 80 - 100 mmHg    POC HCO3 24.8 24 - 28 mmol/L    POC BE 1 -2 to 2 mmol/L    POC SATURATED O2 86 (L) 95 - 100 %    POC TCO2 26 23 - 27 mmol/L    Sample ARTERIAL     Site LR     Allens Test Pass     DelSys Venti Mask     Mode SPONT     Flow 12     FiO2 40     Sp02 94          Significant Imaging: I have reviewed all pertinent imaging results/findings within the past 24 hours.

## 2022-05-17 NOTE — PROCEDURES - EMERGENCY DEPT.
ED Procedure Notes:   Intubation    Date/Time: 5/17/2022 4:50 PM  Location procedure was performed: [facility] EMERGENCY DEPARTMENT  Performed by: Jose Alfredo Araiza MD  Authorized by: Luis Hill MD   Consent Done: Emergent Situation  Indications: respiratory failure and  respiratory distress  Intubation method: video-assisted  Patient status: paralyzed (RSI)  Preoxygenation: BVM  Sedatives: ketmine  Paralytic: succinylcholine  Laryngoscope size: Glide 3  Tube size: 8.0 mm  Tube type: cuffed  Number of attempts: 1  Cords visualized: yes  Post-procedure assessment: chest rise and ETCO2 monitor  Breath sounds: rales/crackles  Cuff inflated: yes  ETT to lip: 24 cm  Tube secured with: ETT titus  Chest x-ray interpreted by radiologist.  Patient tolerance: Patient tolerated the procedure well with no immediate complications  Complications: No  Specimens: No  Implants: No

## 2022-05-18 ENCOUNTER — PATIENT MESSAGE (OUTPATIENT)
Dept: ADMINISTRATIVE | Facility: OTHER | Age: 62
End: 2022-05-18
Payer: MEDICARE

## 2022-05-18 PROBLEM — E83.42 HYPOMAGNESEMIA: Status: ACTIVE | Noted: 2022-05-18

## 2022-05-18 PROBLEM — Z71.89 GOALS OF CARE, COUNSELING/DISCUSSION: Status: ACTIVE | Noted: 2022-05-18

## 2022-05-18 PROBLEM — I95.9 TRANSIENT HYPOTENSION: Status: RESOLVED | Noted: 2022-05-17 | Resolved: 2022-05-18

## 2022-05-18 LAB
ALBUMIN SERPL BCP-MCNC: 3.6 G/DL (ref 3.5–5.2)
ALLENS TEST: ABNORMAL
ALLENS TEST: ABNORMAL
ALP SERPL-CCNC: 95 U/L (ref 55–135)
ALT SERPL W/O P-5'-P-CCNC: 21 U/L (ref 10–44)
ANION GAP SERPL CALC-SCNC: 12 MMOL/L (ref 8–16)
AORTIC ROOT ANNULUS: 3.57 CM
AORTIC VALVE CUSP SEPERATION: 2.08 CM
ASCENDING AORTA: 3.07 CM
AST SERPL-CCNC: 31 U/L (ref 10–40)
AV INDEX (PROSTH): 0.81
AV MEAN GRADIENT: 6 MMHG
AV PEAK GRADIENT: 10 MMHG
AV VALVE AREA: 3.54 CM2
AV VELOCITY RATIO: 0.75
BASOPHILS # BLD AUTO: 0.02 K/UL (ref 0–0.2)
BASOPHILS NFR BLD: 0.2 % (ref 0–1.9)
BILIRUB SERPL-MCNC: 1.5 MG/DL (ref 0.1–1)
BSA FOR ECHO PROCEDURE: 2.17 M2
BUN SERPL-MCNC: 9 MG/DL (ref 8–23)
CALCIUM SERPL-MCNC: 9.1 MG/DL (ref 8.7–10.5)
CHLORIDE SERPL-SCNC: 102 MMOL/L (ref 95–110)
CO2 SERPL-SCNC: 23 MMOL/L (ref 23–29)
CREAT SERPL-MCNC: 0.8 MG/DL (ref 0.5–1.4)
CV ECHO LV RWT: 0.62 CM
DELSYS: ABNORMAL
DELSYS: ABNORMAL
DIFFERENTIAL METHOD: ABNORMAL
DOP CALC AO PEAK VEL: 1.55 M/S
DOP CALC AO VTI: 30.07 CM
DOP CALC LVOT AREA: 4.4 CM2
DOP CALC LVOT DIAMETER: 2.36 CM
DOP CALC LVOT PEAK VEL: 1.16 M/S
DOP CALC LVOT STROKE VOLUME: 106.46 CM3
DOP CALCLVOT PEAK VEL VTI: 24.35 CM
E WAVE DECELERATION TIME: 299.29 MSEC
E/A RATIO: 0.61
E/E' RATIO: 6.21 M/S
ECHO LV POSTERIOR WALL: 1.38 CM (ref 0.6–1.1)
EJECTION FRACTION: 65 %
EOSINOPHIL # BLD AUTO: 0 K/UL (ref 0–0.5)
EOSINOPHIL NFR BLD: 0 % (ref 0–8)
ERYTHROCYTE [DISTWIDTH] IN BLOOD BY AUTOMATED COUNT: 17.1 % (ref 11.5–14.5)
EST. GFR  (AFRICAN AMERICAN): >60 ML/MIN/1.73 M^2
EST. GFR  (NON AFRICAN AMERICAN): >60 ML/MIN/1.73 M^2
FIO2: 40
FIO2: 80
FLOW: 15
FLOW: 5
FRACTIONAL SHORTENING: 37 % (ref 28–44)
GLUCOSE SERPL-MCNC: 180 MG/DL (ref 70–110)
HCO3 UR-SCNC: 21.9 MMOL/L (ref 24–28)
HCO3 UR-SCNC: 27.3 MMOL/L (ref 24–28)
HCT VFR BLD AUTO: 40.8 % (ref 40–54)
HGB BLD-MCNC: 13.9 G/DL (ref 14–18)
IMM GRANULOCYTES # BLD AUTO: 0.03 K/UL (ref 0–0.04)
IMM GRANULOCYTES NFR BLD AUTO: 0.3 % (ref 0–0.5)
INTERVENTRICULAR SEPTUM: 1.45 CM (ref 0.6–1.1)
LA MAJOR: 5.75 CM
LA MINOR: 5.52 CM
LA WIDTH: 4.16 CM
LEFT ATRIUM SIZE: 3.87 CM
LEFT ATRIUM VOLUME INDEX: 37.1 ML/M2
LEFT ATRIUM VOLUME: 77.08 CM3
LEFT INTERNAL DIMENSION IN SYSTOLE: 2.81 CM (ref 2.1–4)
LEFT VENTRICLE DIASTOLIC VOLUME INDEX: 44.05 ML/M2
LEFT VENTRICLE DIASTOLIC VOLUME: 91.62 ML
LEFT VENTRICLE MASS INDEX: 121 G/M2
LEFT VENTRICLE SYSTOLIC VOLUME INDEX: 14.4 ML/M2
LEFT VENTRICLE SYSTOLIC VOLUME: 29.93 ML
LEFT VENTRICULAR INTERNAL DIMENSION IN DIASTOLE: 4.48 CM (ref 3.5–6)
LEFT VENTRICULAR MASS: 250.78 G
LV LATERAL E/E' RATIO: 4.92 M/S
LV SEPTAL E/E' RATIO: 8.43 M/S
LYMPHOCYTES # BLD AUTO: 0.6 K/UL (ref 1–4.8)
LYMPHOCYTES NFR BLD: 5.5 % (ref 18–48)
MAGNESIUM SERPL-MCNC: 1.5 MG/DL (ref 1.6–2.6)
MCH RBC QN AUTO: 32 PG (ref 27–31)
MCHC RBC AUTO-ENTMCNC: 34.1 G/DL (ref 32–36)
MCV RBC AUTO: 94 FL (ref 82–98)
MODE: ABNORMAL
MODE: ABNORMAL
MONOCYTES # BLD AUTO: 0.1 K/UL (ref 0.3–1)
MONOCYTES NFR BLD: 0.9 % (ref 4–15)
MV PEAK A VEL: 0.97 M/S
MV PEAK E VEL: 0.59 M/S
MV STENOSIS PRESSURE HALF TIME: 86.8 MS
MV VALVE AREA P 1/2 METHOD: 2.53 CM2
NEUTROPHILS # BLD AUTO: 10.3 K/UL (ref 1.8–7.7)
NEUTROPHILS NFR BLD: 93.1 % (ref 38–73)
NRBC BLD-RTO: 0 /100 WBC
PCO2 BLDA: 29 MMHG (ref 35–45)
PCO2 BLDA: 37.6 MMHG (ref 35–45)
PH SMN: 7.47 [PH] (ref 7.35–7.45)
PH SMN: 7.49 [PH] (ref 7.35–7.45)
PHOSPHATE SERPL-MCNC: 3.6 MG/DL (ref 2.7–4.5)
PISA TR MAX VEL: 3.55 M/S
PLATELET # BLD AUTO: 231 K/UL (ref 150–450)
PMV BLD AUTO: 9.4 FL (ref 9.2–12.9)
PO2 BLDA: 67 MMHG (ref 80–100)
PO2 BLDA: 69 MMHG (ref 80–100)
POC BE: 0 MMOL/L
POC BE: 4 MMOL/L
POC SATURATED O2: 95 % (ref 95–100)
POC SATURATED O2: 95 % (ref 95–100)
POC TCO2: 23 MMOL/L (ref 23–27)
POC TCO2: 28 MMOL/L (ref 23–27)
POTASSIUM SERPL-SCNC: 3.5 MMOL/L (ref 3.5–5.1)
PROT SERPL-MCNC: 7.1 G/DL (ref 6–8.4)
PULM VEIN S/D RATIO: 1.49
PV PEAK D VEL: 0.55 M/S
PV PEAK S VEL: 0.82 M/S
PV PEAK VELOCITY: 1.19 CM/S
RA MAJOR: 5.79 CM
RA PRESSURE: 3 MMHG
RA WIDTH: 4.21 CM
RBC # BLD AUTO: 4.34 M/UL (ref 4.6–6.2)
RIGHT VENTRICULAR END-DIASTOLIC DIMENSION: 4.86 CM
SAMPLE: ABNORMAL
SAMPLE: ABNORMAL
SINUS: 3.2 CM
SITE: ABNORMAL
SITE: ABNORMAL
SODIUM SERPL-SCNC: 137 MMOL/L (ref 136–145)
SP02: 100
SP02: 92
STJ: 3.09 CM
TDI LATERAL: 0.12 M/S
TDI SEPTAL: 0.07 M/S
TDI: 0.1 M/S
TR MAX PG: 50 MMHG
TRICUSPID ANNULAR PLANE SYSTOLIC EXCURSION: 2.21 CM
TV REST PULMONARY ARTERY PRESSURE: 53 MMHG
WBC # BLD AUTO: 11.03 K/UL (ref 3.9–12.7)

## 2022-05-18 PROCEDURE — 94761 N-INVAS EAR/PLS OXIMETRY MLT: CPT

## 2022-05-18 PROCEDURE — 93005 ELECTROCARDIOGRAM TRACING: CPT

## 2022-05-18 PROCEDURE — 99223 PR INITIAL HOSPITAL CARE,LEVL III: ICD-10-PCS | Mod: ,,, | Performed by: INTERNAL MEDICINE

## 2022-05-18 PROCEDURE — 99291 CRITICAL CARE FIRST HOUR: CPT | Mod: ,,, | Performed by: NURSE PRACTITIONER

## 2022-05-18 PROCEDURE — 84100 ASSAY OF PHOSPHORUS: CPT | Performed by: HOSPITALIST

## 2022-05-18 PROCEDURE — 36600 WITHDRAWAL OF ARTERIAL BLOOD: CPT

## 2022-05-18 PROCEDURE — 82803 BLOOD GASES ANY COMBINATION: CPT

## 2022-05-18 PROCEDURE — 27100171 HC OXYGEN HIGH FLOW UP TO 24 HOURS

## 2022-05-18 PROCEDURE — 63600175 PHARM REV CODE 636 W HCPCS: Performed by: INTERNAL MEDICINE

## 2022-05-18 PROCEDURE — 99223 1ST HOSP IP/OBS HIGH 75: CPT | Mod: ,,, | Performed by: INTERNAL MEDICINE

## 2022-05-18 PROCEDURE — 94640 AIRWAY INHALATION TREATMENT: CPT

## 2022-05-18 PROCEDURE — 99291 PR CRITICAL CARE, E/M 30-74 MINUTES: ICD-10-PCS | Mod: ,,, | Performed by: NURSE PRACTITIONER

## 2022-05-18 PROCEDURE — S0166 INJ OLANZAPINE 2.5MG: HCPCS | Performed by: INTERNAL MEDICINE

## 2022-05-18 PROCEDURE — 85025 COMPLETE CBC W/AUTO DIFF WBC: CPT | Performed by: HOSPITALIST

## 2022-05-18 PROCEDURE — 80053 COMPREHEN METABOLIC PANEL: CPT | Performed by: HOSPITALIST

## 2022-05-18 PROCEDURE — 20000000 HC ICU ROOM

## 2022-05-18 PROCEDURE — 25000003 PHARM REV CODE 250: Performed by: INTERNAL MEDICINE

## 2022-05-18 PROCEDURE — 63600175 PHARM REV CODE 636 W HCPCS: Performed by: HOSPITALIST

## 2022-05-18 PROCEDURE — 83735 ASSAY OF MAGNESIUM: CPT | Performed by: HOSPITALIST

## 2022-05-18 PROCEDURE — 99497 ADVNCD CARE PLAN 30 MIN: CPT | Mod: 25,,, | Performed by: INTERNAL MEDICINE

## 2022-05-18 PROCEDURE — 93010 EKG 12-LEAD: ICD-10-PCS | Mod: ,,, | Performed by: INTERNAL MEDICINE

## 2022-05-18 PROCEDURE — 99900035 HC TECH TIME PER 15 MIN (STAT)

## 2022-05-18 PROCEDURE — 25000003 PHARM REV CODE 250: Performed by: HOSPITALIST

## 2022-05-18 PROCEDURE — 25000242 PHARM REV CODE 250 ALT 637 W/ HCPCS: Performed by: INTERNAL MEDICINE

## 2022-05-18 PROCEDURE — 99497 PR ADVNCD CARE PLAN 30 MIN: ICD-10-PCS | Mod: 25,,, | Performed by: INTERNAL MEDICINE

## 2022-05-18 PROCEDURE — 93010 ELECTROCARDIOGRAM REPORT: CPT | Mod: ,,, | Performed by: INTERNAL MEDICINE

## 2022-05-18 RX ORDER — OLANZAPINE 10 MG/2ML
5 INJECTION, POWDER, FOR SOLUTION INTRAMUSCULAR ONCE
Status: COMPLETED | OUTPATIENT
Start: 2022-05-18 | End: 2022-05-18

## 2022-05-18 RX ORDER — SODIUM CHLORIDE 9 MG/ML
INJECTION, SOLUTION INTRAVENOUS
Status: DISCONTINUED | OUTPATIENT
Start: 2022-05-18 | End: 2022-05-18

## 2022-05-18 RX ORDER — MORPHINE SULFATE 4 MG/ML
1 INJECTION, SOLUTION INTRAMUSCULAR; INTRAVENOUS ONCE
Status: COMPLETED | OUTPATIENT
Start: 2022-05-18 | End: 2022-05-18

## 2022-05-18 RX ORDER — MAGNESIUM SULFATE HEPTAHYDRATE 40 MG/ML
2 INJECTION, SOLUTION INTRAVENOUS ONCE
Status: COMPLETED | OUTPATIENT
Start: 2022-05-18 | End: 2022-05-18

## 2022-05-18 RX ORDER — MORPHINE SULFATE 4 MG/ML
1 INJECTION, SOLUTION INTRAMUSCULAR; INTRAVENOUS EVERY 4 HOURS PRN
Status: DISCONTINUED | OUTPATIENT
Start: 2022-05-18 | End: 2022-05-22 | Stop reason: HOSPADM

## 2022-05-18 RX ORDER — PREDNISONE 20 MG/1
40 TABLET ORAL DAILY
Status: DISCONTINUED | OUTPATIENT
Start: 2022-05-19 | End: 2022-05-22 | Stop reason: HOSPADM

## 2022-05-18 RX ORDER — SODIUM CHLORIDE 9 MG/ML
INJECTION, SOLUTION INTRAVENOUS
Status: DISCONTINUED | OUTPATIENT
Start: 2022-05-18 | End: 2022-05-22 | Stop reason: HOSPADM

## 2022-05-18 RX ORDER — POTASSIUM CHLORIDE 7.45 MG/ML
10 INJECTION INTRAVENOUS ONCE
Status: COMPLETED | OUTPATIENT
Start: 2022-05-18 | End: 2022-05-18

## 2022-05-18 RX ORDER — AMLODIPINE BESYLATE 5 MG/1
10 TABLET ORAL DAILY
Status: DISCONTINUED | OUTPATIENT
Start: 2022-05-18 | End: 2022-05-22 | Stop reason: HOSPADM

## 2022-05-18 RX ADMIN — IPRATROPIUM BROMIDE AND ALBUTEROL SULFATE 3 ML: 2.5; .5 SOLUTION RESPIRATORY (INHALATION) at 07:05

## 2022-05-18 RX ADMIN — IPRATROPIUM BROMIDE AND ALBUTEROL SULFATE 3 ML: 2.5; .5 SOLUTION RESPIRATORY (INHALATION) at 12:05

## 2022-05-18 RX ADMIN — SODIUM CHLORIDE 5 ML/HR: 0.9 INJECTION, SOLUTION INTRAVENOUS at 04:05

## 2022-05-18 RX ADMIN — OLANZAPINE 5 MG: 10 INJECTION, POWDER, LYOPHILIZED, FOR SOLUTION INTRAMUSCULAR at 08:05

## 2022-05-18 RX ADMIN — VANCOMYCIN HYDROCHLORIDE 1750 MG: 1 INJECTION, POWDER, LYOPHILIZED, FOR SOLUTION INTRAVENOUS at 06:05

## 2022-05-18 RX ADMIN — ASPIRIN 81 MG CHEWABLE TABLET 81 MG: 81 TABLET CHEWABLE at 08:05

## 2022-05-18 RX ADMIN — AZITHROMYCIN MONOHYDRATE 500 MG: 500 INJECTION, POWDER, LYOPHILIZED, FOR SOLUTION INTRAVENOUS at 11:05

## 2022-05-18 RX ADMIN — ATORVASTATIN CALCIUM 80 MG: 40 TABLET, FILM COATED ORAL at 08:05

## 2022-05-18 RX ADMIN — PIPERACILLIN AND TAZOBACTAM 4.5 G: 4; .5 INJECTION, POWDER, FOR SOLUTION INTRAVENOUS at 10:05

## 2022-05-18 RX ADMIN — MAGNESIUM SULFATE 2 G: 2 INJECTION INTRAVENOUS at 04:05

## 2022-05-18 RX ADMIN — PIPERACILLIN AND TAZOBACTAM 4.5 G: 4; .5 INJECTION, POWDER, FOR SOLUTION INTRAVENOUS at 02:05

## 2022-05-18 RX ADMIN — PREDNISONE 40 MG: 20 TABLET ORAL at 08:05

## 2022-05-18 RX ADMIN — POTASSIUM CHLORIDE 10 MEQ: 7.46 INJECTION, SOLUTION INTRAVENOUS at 06:05

## 2022-05-18 RX ADMIN — AMLODIPINE BESYLATE 10 MG: 5 TABLET ORAL at 08:05

## 2022-05-18 RX ADMIN — FUROSEMIDE 40 MG: 10 INJECTION, SOLUTION INTRAMUSCULAR; INTRAVENOUS at 06:05

## 2022-05-18 RX ADMIN — PIPERACILLIN AND TAZOBACTAM 4.5 G: 4; .5 INJECTION, POWDER, FOR SOLUTION INTRAVENOUS at 06:05

## 2022-05-18 RX ADMIN — MORPHINE SULFATE 1 MG: 4 INJECTION INTRAVENOUS at 08:05

## 2022-05-18 RX ADMIN — IPRATROPIUM BROMIDE AND ALBUTEROL SULFATE 3 ML: 2.5; .5 SOLUTION RESPIRATORY (INHALATION) at 01:05

## 2022-05-18 RX ADMIN — CLOPIDOGREL 75 MG: 75 TABLET, FILM COATED ORAL at 08:05

## 2022-05-18 RX ADMIN — ENOXAPARIN SODIUM 40 MG: 100 INJECTION SUBCUTANEOUS at 06:05

## 2022-05-18 RX ADMIN — VANCOMYCIN HYDROCHLORIDE 1750 MG: 1 INJECTION, POWDER, LYOPHILIZED, FOR SOLUTION INTRAVENOUS at 04:05

## 2022-05-18 NOTE — ASSESSMENT & PLAN NOTE
"This patient does have evidence of infective focus  My overall impression is sepsis. Vital signs were reviewed and noted in progress note.  Antibiotics given-   Antibiotics (From admission, onward)            Start     Stop Route Frequency Ordered    05/17/22 2245  azithromycin 500 mg in dextrose 5 % 250 mL IVPB (ready to mix system)         05/20 2244 IV Every 24 hours (non-standard times) 05/17/22 2132 05/17/22 2238  vancomycin - pharmacy to dose  (vancomycin IVPB)        "And" Linked Group Details    -- IV pharmacy to manage frequency 05/17/22 2138 05/17/22 1830  piperacillin-tazobactam 4.5 g in dextrose 5 % 100 mL IVPB (ready to mix system)         -- IV Every 8 hours (non-standard times) 05/17/22 1728        Cultures were taken-   Microbiology Results (last 7 days)     Procedure Component Value Units Date/Time    Culture, Respiratory with Gram Stain [[id number]] Collected: 05/17/22 2030    Order Status: Sent Specimen: Sputum, Expectorated Updated: 05/17/22 2126    Blood Culture #2 **CANNOT BE ORDERED STAT** [[id number]] Collected: 05/17/22 1723    Order Status: Sent Specimen: Blood from Peripheral, Forearm, Right Updated: 05/17/22 1727    Blood Culture #1 **CANNOT BE ORDERED STAT** [[id number]] Collected: 05/17/22 1716    Order Status: Sent Specimen: Blood from Peripheral, Upper Arm, Right Updated: 05/17/22 1727        Latest lactate reviewed, they are-   Latest Reference Range & Units 02/22/22 14:54 02/22/22 19:10 05/17/22 17:16   Lactate, Humberto 0.5 - 2.2 mmol/L 8.7 (HH) [1] 2.6 (H) [2] 4.0 (HH) [3]      Organ dysfunction indicated by Acute respiratory failure  Source- Lungs    Source control Achieved by- IV antibiotics    "

## 2022-05-18 NOTE — PROGRESS NOTES
Vancomycin consult follow-up:    Patient reviewed, renal function stable, no new levels, continue current therapy; Next levels due: trough due 5/19/2022 at 0430

## 2022-05-18 NOTE — RESPIRATORY THERAPY
"Notified by nurse that patient self extubated. Nurse placed patient on 100% NRB mask. Patient spo2 100% RR 22. Patient stated "he could not breath"  so he pulled the tube out[facility] BIPAP ordered for Desat. Vent left in room on standby along with BIPAP. Follow Up ABG ordered for 1 hour.   "

## 2022-05-18 NOTE — ASSESSMENT & PLAN NOTE
  [START ON 5/18/2022] aspirin chewable tablet 81 mg, 81 mg, Oral, Daily     [START ON 5/18/2022] atorvastatin tablet 80 mg, 80 mg, Oral, Daily     [START ON 5/18/2022] clopidogreL tablet 75 mg, 75 mg, Oral, Daily

## 2022-05-18 NOTE — CONSULTS
"[facility] - Intensive Care  Critical Care Medicine  Consult Note    Patient Name: Kashif Iverson  MRN: [id number]  Admission Date: 5/17/2022  Hospital Length of Stay: 1 days  Code Status: Full Code  Attending Physician: Jennifer Singleton MD   Primary Care Provider: Eduardo Ruiz MD   Principal Problem: Pneumonia    [unfilled]  Subjective:     HPI:  Mr. Iverson is a 61 year old male with significant PMH notable for HTN, HF (most recent EF 35-40%, G1DD), CAD, previous NSTEMI, anemia, bronchiectasis, 25 year pack year cigarette history, and NSCLC (followed by Dr. Hathaway, diagnosed in 2019) who presented to  ED with shortness of breath and respiratory distress. Per chart review, his wife at bedside states that he woke up yesterday morning very anxious and with worsening dyspnea. Per chart review, his wife states he had "a panic attack like he always does on chemo days."     In the emergency department he was normotensive, tachycardic, and afebrile. COVID and Flu negative. Initial labs work up demonstrates WBC 13.6,  D-dimer 0.84, Na 135, Cr 0.9, gluc 146, TB 1.3, AT 42, LA 8.7-->4, BNP 1051, and Trop 0.016. NSR on EKG. CTA without PE. CT with bilateral pleural effusions (present on previous imaging) R > L, atelectasis, pulm nodules. There was mild perinephric stranding right greater than left with minimal wall thickening of the renal pelvis on the right, and nondistention of the descending and sigmoid colon.      In the emergency department he was given ASA, IVF, started on broad spectrum abx and an infectious work up was sent. Given his respiratory distress he was intubated and placed on the ventilator. After arrival to the ICU, patient self extubated and was placed on BiPAP, and shortly after transitioned to nasal cannula.     In speaking with patient this morning, he reports that he felt as if he were "suffocating" yesterday with worsening anxiety than usual. He reports that this has occurred several times over the last few " months. He is unsure of his medication use (I.e. if he was taking his prescribed Lasix 40mg).       Hospital/ICU Course:  No notes on file    Past Medical History:   Diagnosis Date    Hypertension     Lung cancer     NSCLC    Lung mass     left lung       Past Surgical History:   Procedure Laterality Date    BRONCHOSCOPY N/A 2019    Procedure: Bronchoscopy;  Surgeon: Miguel Diagnostic Provider;  Location: [facility] OR [address] [place] [address];  Service: Anesthesiology;  Laterality: N/A;    CORONARY ANGIOGRAPHY N/A 3/4/2022    Procedure: ANGIOGRAM, CORONARY ARTERY;  Surgeon: Robson Green MD;  Location: [facility] CATH LAB;  Service: Cardiology;  Laterality: N/A;       Review of patient's allergies indicates:  No Known Allergies    Family History       Problem Relation (Age of Onset)    Cancer Father, Sister    Diabetes Mother    Heart defect Mother    No Known Problems Son          Tobacco Use    Smoking status: Former Smoker     Packs/day: 1.00     Years: 25.00     Pack years: 25.00     Types: Cigarettes     Quit date:      Years since quittin.3    Smokeless tobacco: Never Used   Substance and Sexual Activity    Alcohol use: Yes     Comment: ocassionally    Drug use: Never    Sexual activity: Yes     Partners: Female         Review of Systems   Constitutional:  Negative for chills and fever.   HENT:  Negative for sinus pressure and sinus pain.    Respiratory:  Positive for cough and shortness of breath.    Cardiovascular:  Negative for chest pain and leg swelling.   Gastrointestinal:  Negative for nausea and vomiting.   Genitourinary:  Negative for frequency and hematuria.   Musculoskeletal:  Negative for back pain and neck pain.   Skin:  Negative for rash and wound.   Psychiatric/Behavioral:  Negative for agitation, confusion and hallucinations.    Objective:     Vital Signs (Most Recent):  Temp: 97.8 °F (36.6 °C) (22 0800)  Pulse: 88 (22 1000)  Resp: (!) 23 (22 0906)  BP: (!) 153/79 (22  1000)  SpO2: 95 % (05/18/22 1000)   Vital Signs (24h Range):  Temp:  [97.6 °F (36.4 °C)-98.3 °F (36.8 °C)] 97.8 °F (36.6 °C)  Pulse:  [] 88  Resp:  [9-42] 23  SpO2:  [82 %-100 %] 95 %  BP: ()/() 153/79     Weight: 90.2 kg (198 lb 13.7 oz)  Body mass index is 28.53 kg/m².      Intake/Output Summary (Last 24 hours) at 5/18/2022 1013  Last data filed at 5/18/2022 1000  Gross per 24 hour   Intake 2669.03 ml   Output 4525 ml   Net -1855.97 ml       Physical Exam  Vitals and nursing note reviewed.   Constitutional:       General: He is not in acute distress.     Appearance: He is ill-appearing.      Comments: Temporal wasting   HENT:      Head: Normocephalic and atraumatic.      Mouth/Throat:      Mouth: Mucous membranes are dry.      Pharynx: Oropharynx is clear. No oropharyngeal exudate.   Eyes:      General: No scleral icterus.     Pupils: Pupils are equal, round, and reactive to light.   Cardiovascular:      Rate and Rhythm: Normal rate and regular rhythm.      Pulses: Normal pulses.      Heart sounds: Normal heart sounds.   Pulmonary:      Breath sounds: Rales present. No wheezing.   Chest:      Comments: Right chest port - accessed.   Genitourinary:     Comments: Lemon patent and draining at bedside    Musculoskeletal:      Right lower leg: Edema present.      Left lower leg: Edema present.   Skin:     General: Skin is dry.      Capillary Refill: Capillary refill takes less than 2 seconds.      Coloration: Skin is not jaundiced.      Findings: Bruising present.   Neurological:      General: No focal deficit present.      Mental Status: He is alert and oriented to person, place, and time.      GCS: GCS eye subscore is 4. GCS verbal subscore is 5. GCS motor subscore is 6.       Vents:  Vent Mode: PRVC (05/17/22 1827)  Set Rate: 20 BPM (05/17/22 1827)  Vt Set: 450 mL (05/17/22 1827)  PEEP/CPAP: 5 cmH20 (05/17/22 1827)  Oxygen Concentration (%): 80 (05/18/22 0200)  Peak Airway Pressure: 15.4 cmH2O  (05/17/22 1827)  Total Ve: 9.4 mL (05/17/22 1827)  F/VT Ratio<105 (RSBI): (!) 65.71 (05/17/22 1827)    Lines/Drains/Airways       Central Venous Catheter Line  Duration             Port A Cath Single Lumen right subclavian -- days              Drain  Duration                  Urethral Catheter 05/17/22 1635 Straight-tip 18 Fr. <1 day              Peripheral Intravenous Line  Duration                  Peripheral IV - Single Lumen 05/17/22 1441 20 G;1 in Left Antecubital <1 day         Peripheral IV - Single Lumen 05/17/22 1716 18 G;1 3/4 in Right Upper Arm <1 day         Peripheral IV - Single Lumen 05/17/22 1723 18 G;1 3/4 in Right Forearm <1 day                    Significant Labs:    CBC/Anemia Profile:  Recent Labs   Lab 05/17/22  1428 05/18/22  0316   WBC 13.60* 11.03   HGB 14.1 13.9*   HCT 41.7 40.8   PLT SEE COMMENT 231   MCV 95 94   RDW 17.5* 17.1*        Chemistries:  Recent Labs   Lab 05/17/22  1428 05/18/22  0316   * 137   K 4.1 3.5    102   CO2 19* 23   BUN 9 9   CREATININE 0.9 0.8   CALCIUM 9.2 9.1   ALBUMIN 3.9 3.6   PROT 7.9 7.1   BILITOT 1.3* 1.5*   ALKPHOS 101 95   ALT 22 21   AST 42* 31   MG  --  1.5*   PHOS  --  3.6       All pertinent labs within the past 24 hours have been reviewed.    Significant Imaging:   I have reviewed all pertinent imaging results/findings within the past 24 hours.      ABG  Recent Labs   Lab 05/18/22  0200   PH 7.468*   PO2 69*   PCO2 37.6   HCO3 27.3   BE 4     Assessment/Plan:     Pulmonary  * Pneumonia  -- See respiratory failure     Acute respiratory failure with hypoxia and hypercapnia  Patient with Hypoxic Respiratory failure which is Acute on chronic.  he is not on home oxygen.     Signs/symptoms of respiratory failure include- tachypnea, increased work of breathing, respiratory distress and use of accessory muscles. Contributing diagnoses includes - CHF and Pneumonia Labs and images were reviewed. Patient Has recent ABG, which has been reviewed.     61  year old male with multiple medical problems including advanced NSCLC s/p multiple rounds of treatment who presented to  ED with shortness of breath and respiratory distress who was intubated and shortly after self extubated. CTA without PE. CT with bilateral pleural effusions and possible post obstructive PNA     -- Self extubated, now on NC. Wean for SpO2 > 90%   -- IS/ Flutter valve   -- Continue IV lasix BID   -- Continue broad spectrum abx and follow up infectious work up   -- Continue prednisone  -- Needs pulm / onc follow up at outpatient   -- No PFTs on file / No home therapies   -- Albuterol PRN     Cardiac/Vascular  Transient hypotension  Resolved    Coronary artery disease involving native coronary artery of native heart without angina pectoris  -- 3/3 heart cath with nonobstructive CAD  -- LUIS FELIPE with severe disease   -- Continue ASA/Statin/Plavix     PAD (peripheral artery disease)  -- 03/11/22 LUIS FELIPE with severe disease   -- Continue ASA/Statin/plavix    Acute on chronic combined systolic and diastolic heart failure  Most recent ECHO:     · The left ventricle is normal in size with concentric hypertrophy and moderately decreased systolic function.  · The estimated ejection fraction is 35-40%.  · Grade I left ventricular diastolic dysfunction.  · Mild right ventricular enlargement with normal right ventricular systolic function.  · Mild left atrial enlargement.  · Mild right atrial enlargement.  · Mild tricuspid regurgitation.    Bilateral pleural effusions likely in setting of HF and questionable medication compliance.      -- Continue Lasix IV BID   -- Strict I&O   -- Follow up AM XRAY   -- ? Utility of thoracentesis. Will defer at this time.   -- Telemetry   -- Resume home antihypertensives as clinically indicated   -- Holding beta blocker in setting of bradycardia overnight. Will resume as able / clinically indicated.     Essential hypertension  -- Home antihypertensives initially held due to labile  "blood pressure. Will resume as clinically indicated.   -- Holding home BB in setting of bradycardia overnight     ID  Sepsis  This patient does have evidence of infective focus  My overall impression is sepsis. Vital signs were reviewed and noted in progress note.  Antibiotics given-   Antibiotics (From admission, onward)            Start     Stop Route Frequency Ordered    05/18/22 0530  vancomycin (VANCOCIN) 1,750 mg in dextrose 5 % 500 mL IVPB         -- IV Every 12 hours (non-standard times) 05/17/22 2148 05/17/22 2245  azithromycin 500 mg in dextrose 5 % 250 mL IVPB (ready to mix system)         05/20 2244 IV Every 24 hours (non-standard times) 05/17/22 2132 05/17/22 2238  vancomycin - pharmacy to dose  (vancomycin IVPB)        "And" Linked Group Details    -- IV pharmacy to manage frequency 05/17/22 2138 05/17/22 1830  piperacillin-tazobactam 4.5 g in dextrose 5 % 100 mL IVPB (ready to mix system)         -- IV Every 8 hours (non-standard times) 05/17/22 1728        Cultures were taken-   Microbiology Results (last 7 days)     Procedure Component Value Units Date/Time    Culture, Respiratory with Gram Stain [[id number]] Collected: 05/17/22 2030    Order Status: Completed Specimen: Sputum, Expectorated Updated: 05/18/22 0824     Gram Stain (Respiratory) Few Gram positive cocci in clusters     Gram Stain (Respiratory) <10 epithelial cells per low power field.     Gram Stain (Respiratory) No WBC's    Blood Culture #1 **CANNOT BE ORDERED STAT** [[id number]] Collected: 05/17/22 1716    Order Status: Completed Specimen: Blood from Peripheral, Upper Arm, Right Updated: 05/18/22 0312     Blood Culture, Routine No Growth to date    Blood Culture #2 **CANNOT BE ORDERED STAT** [[id number]] Collected: 05/17/22 1723    Order Status: Completed Specimen: Blood from Peripheral, Forearm, Right Updated: 05/18/22 0312     Blood Culture, Routine No Growth to date        Latest lactate reviewed, they are-  Recent Labs   Lab " "05/17/22  1716   LACTATE 4.0*       Organ dysfunction indicated by Acute respiratory failure  Source- lung     Source control Achieved by- broad spectrum abx       Oncology  Lung cancer, main bronchus, left  Per chart review: "On 8/30/2019 he underwent bronchoscopy that showed 'a partially obstructing (about 80% obstructed) mass was found in the middle portion in the left mainstem bronchus. The mass was large and bloody, circumferential, endobronchial, friable and necrotic. 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  Completed 4 cycles and then 2/10/2021 scans showed progression.  2/24/2021 he was subsequent started on gemcitabine with Dr. Cruz."    -- Oncology aware patient admitted. Appreciate assistance   -- Palliative consulted. Appreciate assistance.     Palliative Care  Advance care planning  -- Palliative consulted. See thorough documentation from consult note 05/18        Critical Care Daily Checklist:    A: Awake: RASS Goal/Actual Goal:    Actual: Duran Agitation Sedation Scale (RASS): Alert and calm   B: Spontaneous Breathing Trial Performed?     C: SAT & SBT Coordinated?  N/A                      D: Delirium: CAM-ICU     E: Early Mobility Performed? No   F: Feeding Goal:    Status:     Current Diet Order   Procedures    Diet Cardiac [facility]     Order Specific Question:   Indicate patient location for additional diet options:     Answer:   [facility]      AS: Analgesia/Sedation N/A   T: Thromboembolic Prophylaxis Lovenox    H: HOB > 300 Yes   U: Stress Ulcer Prophylaxis (if needed) N/A    G: Glucose Control Following.    B: Bowel Function     I: Indwelling Catheter (Lines & Griffin) Necessity PIV. D/C griffin    D: De-escalation of Antimicrobials/Pharmacotherapies Follow up culture data     Plan for the day/ETD Wean FiO2 for Spo2 > 90%. Continue abx and infectious " work up. IV diuresis     Code Status:  Family/Goals of Care: Full Code  Will call and update wife      Critical Care Time: 60 minutes    Plan of care discussed with primary team and Dr. Adair. Attestation to follow.     Critical secondary to Patient has a condition that poses threat to life and bodily function: acute hypoxemic respiratory failure and respiratory distress      Critical care was time spent personally by me on the following activities: development of treatment plan with patient or surrogate and bedside caregivers, discussions with consultants, evaluation of patient's response to treatment, examination of patient, ordering and performing treatments and interventions, ordering and review of laboratory studies, ordering and review of radiographic studies, pulse oximetry, re-evaluation of patient's condition. This critical care time did not overlap with that of any other provider or involve time for any procedures.    Thank you for your consult. I will follow-up with patient. Please contact us if you have any additional questions.     Deuce Garcia NP  Critical Care Medicine  [facility] - Intensive Care

## 2022-05-18 NOTE — ASSESSMENT & PLAN NOTE
Patient with Hypercapnic and Hypoxic Respiratory failure which is Acute.  he is not on home oxygen. Supplemental oxygen was provided and noted- Vent Mode: PRVC  Oxygen Concentration (%):  [] 50  Resp Rate Total:  [20 br/min-54 br/min] 54 br/min  Vt Set:  [450 mL] 450 mL  PEEP/CPAP:  [5 cmH20] 5 cmH20  Mean Airway Pressure:  [5.4 cmH20-10.7 cmH20] 10.7 cmH20.   Signs/symptoms of respiratory failure include- tachypnea, increased work of breathing and respiratory distress. Contributing diagnoses includes - CHF, COPD and Pneumonia Labs and images were reviewed. Patient Has recent ABG, which has been reviewed. Will treat underlying causes and adjust management of respiratory failure as follows:    albuterol-ipratropium 2.5 mg-0.5 mg/3 mL nebulizer 3 mL, Nebulization, Q4H PRN and Q6 scheduled     furosemide injection 40 mg, 40 mg, Intravenous, Q12H      Solumedrol 125mg iv x 1 now    [START ON 5/18/2022] predniSONE tablet 40 mg, 40 mg, Oral, Daily     Initial ABG showed pH 7.26, PCO2 54, PAO2 286 with follow up VBG showing pH 7.38 and PCO2 of 42.  On arrival to MICU he self-extubated and BiPAP was attempted, which he refused.  He is now on Venti mask with plans for follow up ABG 2 hours.

## 2022-05-18 NOTE — ASSESSMENT & PLAN NOTE
His last TTE was on 2/23/22, and showed an estimated ejection fraction of 35-40%, normal RV systolic function (estimated PA systolic pressure is greater than 22 mmHg), and grade I left ventricular diastolic dysfunction.    His BNP today is 1051  Starting Lasix 40mg iv Q12 hours with care  Holding home ARB and BB due to transient hypotension

## 2022-05-18 NOTE — ASSESSMENT & PLAN NOTE
- Follows with Dr Hathaway outpatient; on palliative chemotherapy. Provided update to his oncologist.

## 2022-05-18 NOTE — PLAN OF CARE
[facility] - Intensive Care  Initial Discharge Assessment       Primary Care Provider: Eduardo Ruiz MD    Admission Diagnosis: Hypoxic [R09.02]  Encounter for orogastric (OG) tube placement [Z46.59]  Chest pain [R07.9]  Respiratory failure [J96.90]    Admission Date: 5/17/2022  Expected Discharge Date:     Discharge Barriers Identified: None    Payor: MEDICARE / Plan: MEDICARE PART A & B / Product Type: Government /     Extended Emergency Contact Information  Primary Emergency Contact: Natty Iverson  Mobile Phone: [phone]  Relation: Spouse  Preferred language: English   needed? No    Discharge Plan A: Home  Discharge Plan B: Home      [facility] #[id number] [address] AT [address] [address]  Phone: [phone] Fax: [phone]      Initial Assessment (most recent)     Adult Discharge Assessment - 05/18/22 1505        Discharge Assessment    Assessment Type Discharge Planning Assessment     Confirmed/corrected address, phone number and insurance Yes     Confirmed Demographics Correct on Facesheet     Source of Information health record     Communicated [place] with patient/caregiver Date not available/Unable to determine     Reason For Admission Acute Respiratory Failure     Lives With spouse     Facility Arrived From: Home     Do you expect to return to your current living situation? Yes     Do you have help at home or someone to help you manage your care at home? Yes     Who are your caregiver(s) and their phone number(s)? wife; Natty Iverson  [phone]     Prior to hospitilization cognitive status: Alert/Oriented     Current cognitive status: Alert/Oriented     Walking or Climbing Stairs Difficulty none     Dressing/Bathing Difficulty none     Equipment Currently Used at Home oxygen     Readmission within 30 days? No     Patient currently being followed by outpatient case management? No     Do you currently have service(s)  that help you manage your care at home? No     Do you take prescription medications? Yes     Do you have prescription coverage? Yes     Coverage Medicare     Do you have any problems affording any of your prescribed medications? No     Is the patient taking medications as prescribed? yes     Who is going to help you get home at discharge? wife; Natty Iverson  [phone]     How do you get to doctors appointments? car, drives self     Are you on dialysis? No     Do you take coumadin? No     Discharge Plan A Home     Discharge Plan B Home     DME Needed Upon Discharge  none     Discharge Barriers Identified None        Relationship/Environment    Name(s) of Who Lives With Patient wife; Natty Iverson  [phone]

## 2022-05-18 NOTE — PLAN OF CARE
SW f/u with patient's wife, Natty, at the bedside.  Patient is from home with spouse and mostly independent.  Patient is currently receiving chemo at [facility].  His last appt with his PCP was about 1 month ago.  Patient recently became short of breath while walking to chemo session and subsequently admitted.  Pt may benefit from a walker.  Wife drives patient to appointments.  His preferred pharmacy is [facility] on [address].  Patient's wife requested and received information to apply for [facility] Financial Assistance Program.   will continue to assist with discharge planning.

## 2022-05-18 NOTE — PROGRESS NOTES
Pharmacokinetic Initial Assessment: IV Vancomycin    Assessment/Plan:    Initiate intravenous vancomycin with loading dose of 2000 mg once followed by a maintenance dose of vancomycin 1750mg IV every 12 hours  Desired empiric serum trough concentration is 10 to 20 mcg/mL  Draw vancomycin trough level 60 min prior to fourth dose on 5/19/22 at approximately 0430  Pharmacy will continue to follow and monitor vancomycin.      Please contact pharmacy at extension [phone] with any questions regarding this assessment.     Thank you for the consult,   Jordyn Pérez       Patient brief summary:  Kashif Iverson is a 61 y.o. male initiated on antimicrobial therapy with IV Vancomycin for treatment of suspected lower respiratory infection    Drug Allergies:   Review of patient's allergies indicates:  No Known Allergies    Actual Body Weight:   95.4 kg    Renal Function:   Estimated Creatinine Clearance: 100 mL/min (based on SCr of 0.9 mg/dL).,     Dialysis Method (if applicable):  N/A    CBC (last 72 hours):  Recent Labs   Lab Result Units 05/17/22  1428   WBC K/uL 13.60*   Hemoglobin g/dL 14.1   Hematocrit % 41.7   Platelets K/uL SEE COMMENT   Gran % % 84.1*   Lymph % % 7.4*   Mono % % 7.6   Eosinophil % % 0.1   Basophil % % 0.5   Differential Method  Automated       Metabolic Panel (last 72 hours):  Recent Labs   Lab Result Units 05/17/22  1428 05/17/22  2040   Sodium mmol/L 135*  --    Potassium mmol/L 4.1  --    Chloride mmol/L 103  --    CO2 mmol/L 19*  --    Glucose mg/dL 146*  --    Glucose, UA   --  Negative   BUN mg/dL 9  --    Creatinine mg/dL 0.9  --    Albumin g/dL 3.9  --    Total Bilirubin mg/dL 1.3*  --    Alkaline Phosphatase U/L 101  --    AST U/L 42*  --    ALT U/L 22  --        Drug levels (last 3 results):  No results for input(s): VANCOMYCINRA, VANCOMYCINPE, VANCOMYCINTR in the last 72 hours.    Microbiologic Results:  Microbiology Results (last 7 days)       Procedure Component Value Units Date/Time    Culture,  Respiratory with Gram Stain [[id number]] Collected: 05/17/22 2030    Order Status: Sent Specimen: Sputum, Expectorated Updated: 05/17/22 2126    Blood Culture #2 **CANNOT BE ORDERED STAT** [[id number]] Collected: 05/17/22 1723    Order Status: Sent Specimen: Blood from Peripheral, Forearm, Right Updated: 05/17/22 1727    Blood Culture #1 **CANNOT BE ORDERED STAT** [[id number]] Collected: 05/17/22 1716    Order Status: Sent Specimen: Blood from Peripheral, Upper Arm, Right Updated: 05/17/22 1727

## 2022-05-18 NOTE — ASSESSMENT & PLAN NOTE
"Per chart review: "On 8/30/2019 he underwent bronchoscopy that showed 'a partially obstructing (about 80% obstructed) mass was found in the middle portion in the left mainstem bronchus. The mass was large and bloody, circumferential, endobronchial, friable and necrotic. 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  Completed 4 cycles and then 2/10/2021 scans showed progression.  2/24/2021 he was subsequent started on gemcitabine with Dr. Cruz."    -- Oncology aware patient admitted. Appreciate assistance   -- Palliative consulted. Appreciate assistance.   "

## 2022-05-18 NOTE — ASSESSMENT & PLAN NOTE
Concern for post obstructive PNA  Continue vanc, zosyn, azithromycin for now  Follow up ET aspirate culture- currently few GPC in clusters

## 2022-05-18 NOTE — ASSESSMENT & PLAN NOTE
- Consult for advance care planning and continuity of care in pt with underlying metastatic lung cancer (on palliative chemotherapy) admitted to the ICU with acute respiratory failure; he was emergently intubated in the ER, though self-extubated several hours later and is doing well on NC oxygen. Etiology of respiratory failure believed to be due to pleural effusions/volume overload and pneumonia. He has diuresed well in last 24 hours (5L UOP), and is also on antibiotics. Chart reviewed in depth; discussed pt during MDT rounds. Pt has previously been seen by our team   - Along with Jovan Baca (ICU NP), met with pt at bedside. He was awake, sitting up in bed eating breakfast, and able to have a full conversation. Reminded him of role of palliative medicine, and discussed how things have been going outside of hospital.   - He said other than this acute episode, he's been doing well. He lives with his wife (legal MPOA) and his son. At baseline, he says he is ambulatory and is not on home oxygen.   - Medical update provided; discussed our transparent concern that pt has now been on life support twice in the last 3 months, which makes us worried that this will occur again. There are instances where he is home alone, which we said is not safe. Additionally, he has limited insight into his chronic illnesses, and does not know any of his home medications (said his wife puts his pills in pill container).   - He said it was frightening to have this most recent episode occur; support provided. He verbalized several times that he wants to live; care goals are consistent with aggressive medical care. However, should his preferences change at any point, he does qualify for hospice care. Did not discuss this during first visit, as pt seemed quite overwhelmed by last 24 hours.   - Will try to call his wife in next 24 hours; our team will follow up with pt while in hospital

## 2022-05-18 NOTE — CARE UPDATE
05/17/22 2045   PRE-TX-O2   O2 Device (Oxygen Therapy) venti mask   Flow (L/min) 15   Oxygen Concentration (%) 50   SpO2 (!) 94 %   Pulse Oximetry Type Continuous   $ Pulse Oximetry - Multiple Charge Pulse Oximetry - Multiple   Pulse 72   Resp (!) 32   BP (!) 157/84   Ready to Wean/Extubation Screen   FIO2<=50 (chart decimal) 0.5     Placed back on venturi mask 15L 50% fio2 per

## 2022-05-18 NOTE — NURSING
1917: Pt extubated self with soft wrist restraints in use. Sedation stopped. Sats 78%. NRB placed. RT paged. eICU paged. MD Jimenez on camera in room. Sats up to 100%. NSR in 80s. BP stable. Bipap ordered on standby but MD Jimenez stated ok to try NRB and wean as tolerated to keep sats 91-93%. Pt oriented to place, time, and person but not situation. Does not remember removing the ETT. Pt wife at bedside. Updated on POC.     1946: MD Woodard notified of events.

## 2022-05-18 NOTE — ASSESSMENT & PLAN NOTE
Patient with Hypoxic Respiratory failure which is Acute on chronic.  he is not on home oxygen.     Signs/symptoms of respiratory failure include- tachypnea, increased work of breathing, respiratory distress and use of accessory muscles. Contributing diagnoses includes - CHF and Pneumonia Labs and images were reviewed. Patient Has recent ABG, which has been reviewed.     61 year old male with multiple medical problems including advanced NSCLC s/p multiple rounds of treatment who presented to  ED with shortness of breath and respiratory distress who was intubated and shortly after self extubated. CTA without PE. CT with bilateral pleural effusions and possible post obstructive PNA     -- Self extubated, now on NC. Wean for SpO2 > 90%   -- IS/ Flutter valve   -- Continue IV lasix BID   -- Continue broad spectrum abx and follow up infectious work up   -- Continue prednisone  -- Needs pulm / onc follow up at outpatient   -- No PFTs on file / No home therapies   -- Albuterol PRN

## 2022-05-18 NOTE — HOSPITAL COURSE
Mr Kashif Iverson is a 61 y.o. man admitted with acute hypoxic respiratory failure. He has known SCLC on palliative chemotherapy and systolic/diastolic CHF. He was briefly intubated on 5/18 but self extubated a few hours later. Started IV lasix for CHF exacerbation, broad spectrum antibiotics for potential post obstructive pneumonia, and prednisone for potential COPD exacerbation (no PFTs to review). Respiratory failure seems most likely due to acute on chronic systolic/diastolic CHF as he improved rapidly with lasix. He has had episodes of anxiety that worsen his respiratory status.  He has remained afebrile and hemodynamically stable.  Able to wean down oxygen.  Will assess for home oxygen needs prior to discharge.  He has been transitioned to oral Levaquin.  Will increase home Lasix to bid.  Patient will be discharged home with HH.  He will be referred to Palliative Care.  Patient will follow up with PCP and Heme/Onc.

## 2022-05-18 NOTE — CLINICAL REVIEW
IP Sepsis Screen (most recent)     Sepsis Screen - 05/17/22 2008     Is the patient's history or complaint suggestive of a possible infection? Yes  -AD    Are there at least two of the following signs and symptoms present? Yes  -AD    Sepsis signs/symptoms - Tachypnea Tachypnea     >20  -AD    Sepsis signs/symptoms - WBC WBC < 4,000 or WBC > 12,000  -AD    Sepsis signs/symptoms - Altered Mental Status Altered Mental Status  -AD    Are any of the following organ dysfunction criteria present and not considered to be due to a chronic condition? Yes  -AD    Organ Dysfunction Criteria Lactate > 2.0  -AD    Organ Dysfunction Criteria - Resp Comp Respiratory compromise requiring Bipap, Cpap, or Intubation  -AD    Initiate Sepsis Protocol No  -AD    Reason sepsis not considered Pt. receiving appropriate management  -AD          User Key  (r) = Recorded By, (t) = Taken By, (c) = Cosigned By    Initials Name    LAWRENCE Fuller RN

## 2022-05-18 NOTE — HPI
"61 year old male with metastatic NSCLC on gemcitabine is admitted for acute respiratory failure requiring intubation. Pt self extubated in ICU . CTA with worsening bilateral effusion Was extubated, but required reintubation. Pt reports he felt like he was " suffocating" morning prior to admission and very anxious. Pt also uncertain if he taking his prescribed medication . Consulted for NSCLC.  "

## 2022-05-18 NOTE — PROGRESS NOTES
"[facility] Medicine  Progress Note    Patient Name: Kashif Iverson  MRN: [id number]  Patient Class: IP- Inpatient   Admission Date: 5/17/2022  Length of Stay: 1 days  Attending Physician: Jennifer Singleton MD  Primary Care Provider: Eduardo Ruiz MD        Subjective:     Principal Problem:Acute respiratory failure with hypoxia and hypercapnia        HPI:  Mr. 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  He is followed by Dr. Hathaway in Oncology, last seeing him in clinic on 4/29.22.  At that time he noted, "On 8/30/2019 he underwent bronchoscopy that showed 'a partially obstructing (about 80% obstructed) mass was found in the middle portion in the left mainstem bronchus. The mass was large and bloody, circumferential, endobronchial, friable and necrotic. 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  Completed 4 cycles and then 2/10/2021 scans showed progression.  2/24/2021 he was subsequent started on gemcitabine with Dr. Cruz."    Other medical issues include SCHF, macrocytic anemia, bronchiectasis, NSTEMI with non-obstructive CAD on [facility] (follows with Dr. Roman), MAT, and PAD.  His last TTE was on 2/23/22, and showed an estimated ejection fraction of 35-40%, normal RV systolic function (estimated PA systolic pressure is greater than 22 mmHg), and grade I left ventricular diastolic dysfunction.    He now comes to the ED after developing acute SOB today.  His wife is at the bedside and assists with the history.  Apparently he awoke this morning for chemo and immediately became very anxious and had, "a panic attack, like he always does on chemo days."  His wife settled him down and he improved enough to be driven to [facility].  On walking from the parking lot to the Onc " "clinic he was severely dyspneic.  He was found in clinic to have an O2 sat of 85%, so he was referred to the ED.  He denies fever or chills, but does report some cough and leg swelling.  He denies N/V/D.    In the ED his VS's showed BP 90/59-->130/79 (BP Location: Left arm, Patient Position: Lying)   Pulse 111-->66   Temp Tm 97.7F, Tc 97.6 °F (36.4 °C) (Axillary)   Resp 18   Ht 5' 10" (1.778 m)   Wt 95.4 kg (210 lb 5.1 oz)   SpO2 87%-->100%  BMI 30.18 kg/m².  Labs showed WBC 13.6, PLT clumped, D-dimer 0.84, Na 135, Cr 0.9, gluc 146, TB 1.3, AT 42, LA 8.7-->4, BNP 1051, Trop 0.016, COVID negative and Flu negative.  Initial ABG showed pH 7.26, PCO2 54, PAO2 286 with follow up VBG showing pH 7.38 and PCO2 of 42.  EKG showed NSR 73, Normal sinus rhythm, incomplete right bundle branch block, and nonspecific ST and T wave abnormality.    CTA chest and CT abd/pelvis showed no evidence of pulmonary embolism.  There was a moderate pleural effusion on the right and mild pleural effusion on the left.  There is associated bilateral atelectasis and mild ground-glass infiltrate or edema.  There was airspace disease in left upper lobe and superior left lower lobe with slight nodular components.  This could represent neoplasm.  There was scarring, pneumonitis and or post treatment changes not excluded.  There was mild perinephric stranding right greater than left with minimal wall thickening of the renal pelvis on the right.  There was nondistention of the descending and sigmoid colon.  Minimal wall thickening is not excluded.    In the ED he was treated with ASA 325mg 1504, LR 1L 1618, Vanco 2g 1735.  Due to respiratory distress, he underwent RSI and was placed on the vent - 8.0 ETT secured 24 cm @ lips. Pt was placed on PRVC: 20/450/+5/80% per ER settings. OGT was placed and confirmed in the stomach by KUB.  Shortly after arrival to the ICU room, he self-extubated and ripped out his OGT.  He was placed on BiPAP, which he " refused, then placed on Venti mask.         Overview/Hospital Course:  Mr Kashif Iverson is a 61 y.o. man admitted with acute hypoxic respiratory failure. He has known SCLC on palliative chemotherapy and systolic/diastolic CHF. He was briefly intubated on 5/18 but self extubated a few hours later. Started IV lasix for suspected CHF exacerbation, broad spectrum antibiotics for potential post obstructive pneumonia, and prednisone for potential COPD exacerbation (no PFTs to review). Respiratory failure seems most likely due to acute on chronic systolic/diastolic CHF as he improved rapidly with lasix. He is now on 3L HFNC.       Interval History: No complaints today. He does not feel short of breath currently. He has nasal congestion.     Review of Systems   Constitutional:  Negative for chills and fever.   HENT:  Positive for congestion.    Respiratory:  Negative for chest tightness, shortness of breath and wheezing.    Cardiovascular:  Negative for chest pain, palpitations and leg swelling.   Gastrointestinal:  Negative for abdominal distention, nausea and vomiting.   Genitourinary:  Negative for difficulty urinating.   Musculoskeletal:  Negative for arthralgias and myalgias.   Neurological:  Negative for weakness and numbness.   Psychiatric/Behavioral:  Negative for confusion.    Objective:     Vital Signs (Most Recent):  Temp: 97.8 °F (36.6 °C) (05/18/22 0800)  Pulse: 82 (05/18/22 1309)  Resp: 20 (05/18/22 1309)  BP: (!) 142/74 (05/18/22 1200)  SpO2: 98 % (05/18/22 1309)   Vital Signs (24h Range):  Temp:  [97.6 °F (36.4 °C)-98.3 °F (36.8 °C)] 97.8 °F (36.6 °C)  Pulse:  [] 82  Resp:  [9-42] 20  SpO2:  [82 %-100 %] 98 %  BP: ()/() 142/74     Weight: 90.2 kg (198 lb 13.7 oz)  Body mass index is 28.53 kg/m².    Intake/Output Summary (Last 24 hours) at 5/18/2022 1320  Last data filed at 5/18/2022 1200  Gross per 24 hour   Intake 2712.07 ml   Output 5845 ml   Net -3132.93 ml      Physical Exam  Vitals and  nursing note reviewed.   Constitutional:       General: He is not in acute distress.     Appearance: He is not toxic-appearing.   HENT:      Head: Normocephalic and atraumatic.      Nose: Nose normal.      Mouth/Throat:      Mouth: Mucous membranes are moist.   Cardiovascular:      Rate and Rhythm: Normal rate and regular rhythm.      Pulses: Normal pulses.      Heart sounds: Normal heart sounds. No murmur heard.    No gallop.   Pulmonary:      Effort: Pulmonary effort is normal. No respiratory distress.      Breath sounds: No wheezing or rales.      Comments: 3L HFNC. Becomes tachypneic when speaking for prolonged period. Diminished on right side  Abdominal:      General: Bowel sounds are normal. There is no distension.      Palpations: Abdomen is soft.      Tenderness: There is no abdominal tenderness. There is no guarding.   Musculoskeletal:      Right lower leg: No edema.      Left lower leg: Edema present.   Skin:     General: Skin is warm and dry.   Neurological:      Mental Status: He is alert and oriented to person, place, and time.       Significant Labs: All pertinent labs within the past 24 hours have been reviewed.    Significant Imaging: I have reviewed all pertinent imaging results/findings within the past 24 hours.      Assessment/Plan:      * Acute respiratory failure with hypoxia and hypercapnia  Patient admitted with Hypercapnic and Hypoxic which is Acute.  he is not on home oxygen. Signs/symptoms of respiratory failure include- tachypnea, increased work of breathing and respiratory distress present on admission.   - Labs and images were reviewed. Patient Has recent ABG, which has been reviewed..   - Supplemental oxygen was provided and noted- High Flow Nasal Cannula  3L HFNC   - Respiratory failure is due to- CHF, COPD and Pneumonia   - treat CHF with IV lasix. Repeat TTE  - unknown if he has COPD- no prior PFTs. Given smoking history, will continue prednisone  - concern for post obstructive PNA.  "Follow up ET aspirate. Continue vanc, zosyn, azithro for now       Hypomagnesemia  Replace and monitor        Goals of care, counseling/discussion  Advance Care Planning   Palliative care consulted.       Sepsis  This patient does have evidence of infective focus  My overall impression is sepsis. Vital signs were reviewed and noted in progress note.  Antibiotics given-   Antibiotics (From admission, onward)            Start     Stop Route Frequency Ordered    05/18/22 0530  vancomycin (VANCOCIN) 1,750 mg in dextrose 5 % 500 mL IVPB         -- IV Every 12 hours (non-standard times) 05/17/22 2148 05/17/22 2245  azithromycin 500 mg in dextrose 5 % 250 mL IVPB (ready to mix system)         05/20 2244 IV Every 24 hours (non-standard times) 05/17/22 2132 05/17/22 2238  vancomycin - pharmacy to dose  (vancomycin IVPB)        "And" Linked Group Details    -- IV pharmacy to manage frequency 05/17/22 2138 05/17/22 1830  piperacillin-tazobactam 4.5 g in dextrose 5 % 100 mL IVPB (ready to mix system)         -- IV Every 8 hours (non-standard times) 05/17/22 1728        Cultures were taken-   Microbiology Results (last 7 days)     Procedure Component Value Units Date/Time    Culture, Respiratory with Gram Stain [[id number]] Collected: 05/17/22 2030    Order Status: Completed Specimen: Sputum, Expectorated Updated: 05/18/22 1158     Respiratory Culture Normal respiratory maksim     Gram Stain (Respiratory) Few Gram positive cocci in clusters     Gram Stain (Respiratory) <10 epithelial cells per low power field.     Gram Stain (Respiratory) No WBC's    Blood Culture #1 **CANNOT BE ORDERED STAT** [[id number]] Collected: 05/17/22 1716    Order Status: Completed Specimen: Blood from Peripheral, Upper Arm, Right Updated: 05/18/22 0312     Blood Culture, Routine No Growth to date    Blood Culture #2 **CANNOT BE ORDERED STAT** [[id number]] Collected: 05/17/22 1723    Order Status: Completed Specimen: Blood from Peripheral, Forearm, " Right Updated: 05/18/22 0312     Blood Culture, Routine No Growth to date        Latest lactate reviewed, they are-   Latest Reference Range & Units 02/22/22 14:54 02/22/22 19:10 05/17/22 17:16   Lactate, Humberto 0.5 - 2.2 mmol/L 8.7 (HH) [1] 2.6 (H) [2] 4.0 (HH) [3]      Organ dysfunction indicated by Acute respiratory failure  Source- Lungs    Source control Achieved by- IV antibiotics      Coronary artery disease involving native coronary artery of native heart without angina pectoris  Continue asa, plavix, statin         PAD (peripheral artery disease)  Continue ASA and Plavix as above      Acute on chronic combined systolic and diastolic heart failure  Patient admitted with shortness of breath, edema consistent with acute on chronic systolic and diastolic CHF. Cause of exacerbation is unclear     His last TTE was on 2/23/22, and showed an estimated ejection fraction of 35-40%, normal RV systolic function (estimated PA systolic pressure is greater than 22 mmHg), and grade I left ventricular diastolic dysfunction.    BNP  Recent Labs   Lab 05/17/22  1428   BNP 1,051*     Start on IV lasix diuresis. Monitor ins and outs  TTE pending   Nutrition consulted for low salt cardiac diet education  Cardiology follow up on discharge    Essential hypertension  BP normalized  Continue home amlodipine for now       Secondary malignant neoplasm of bone  Noted       Lung cancer, main bronchus, left  SCLC with obstructing mass in L mainstem bronchus and bone metastases. He is receiving palliative chemotherapy and radiation.   - Palliative care consult      Pneumonia  Concern for post obstructive PNA  Continue vanc, zosyn, azithromycin for now  Follow up ET aspirate culture- currently few GPC in clusters         VTE Risk Mitigation (From admission, onward)         Ordered     enoxaparin injection 40 mg  Daily         05/17/22 1926     Place NASIR hose  Until discontinued         05/17/22 1926     IP VTE HIGH RISK PATIENT  Once          05/17/22 1926     Place sequential compression device  Until discontinued         05/17/22 1926                Discharge Planning   AZ:      Code Status: Full Code   Is the patient medically ready for discharge?:     Reason for patient still in hospital (select all that apply): Patient trending condition               Critical care time spent on the evaluation and treatment of severe organ dysfunction, review of pertinent labs and imaging studies, discussions with consulting providers and discussions with patient/family: 30 minutes.      Jennifer Singleton MD  Department of Hospital Medicine   [facility] - Intensive Care

## 2022-05-18 NOTE — EICU
eICU Physician Virtual/Remote Brief Evaluation Note      Telephone call RT  ABG pH 7.42, pCO2 37, PO2 51, bicarb 24, O2 sat 86  O2 sat brain 92  Patient was on 40% Venti mask at time of ABG  Chart reviewed, discussed with RT  Will increase to 50% Ventimask and if O2 sat 95 or above will switch back to non-rebreather    BHitesh Jimenez MD  [facility] Attending  [phone]    This report has been created through the use of [employer] dictation software. Typographical and content errors may occur with this process. While efforts are made to detect and correct such errors, in some cases errors will persist. For this reason, wording in this document should be considered in the proper context and not strictly verbatim

## 2022-05-18 NOTE — ASSESSMENT & PLAN NOTE
Patient admitted with Hypercapnic and Hypoxic which is Acute.  he is not on home oxygen. Signs/symptoms of respiratory failure include- tachypnea, increased work of breathing and respiratory distress present on admission.   - Labs and images were reviewed. Patient Has recent ABG, which has been reviewed..   - Supplemental oxygen was provided and noted- High Flow Nasal Cannula  3L HFNC   - Respiratory failure is due to- CHF, COPD and Pneumonia   - treat CHF with IV lasix. Repeat TTE  - unknown if he has COPD- no prior PFTs. Given smoking history, will continue prednisone  - concern for post obstructive PNA. Follow up ET aspirate. Continue vanc, zosyn, azithro for now

## 2022-05-18 NOTE — PLAN OF CARE
Remains in ICU. Transfer orders placed. On 5L NC. Tolerating diet well. Lemon removed, now voiding per urinal. VSS. No distress noted. Free of falls, injury, or breakdown.

## 2022-05-18 NOTE — SUBJECTIVE & OBJECTIVE
Past Medical History:   Diagnosis Date    Hypertension     Lung cancer     NSCLC    Lung mass     left lung       Past Surgical History:   Procedure Laterality Date    BRONCHOSCOPY N/A 2019    Procedure: Bronchoscopy;  Surgeon: Miguel Diagnostic Provider;  Location: [facility] OR [address] [place] [address];  Service: Anesthesiology;  Laterality: N/A;    CORONARY ANGIOGRAPHY N/A 3/4/2022    Procedure: ANGIOGRAM, CORONARY ARTERY;  Surgeon: Robson Green MD;  Location: [facility] CATH LAB;  Service: Cardiology;  Laterality: N/A;       Review of patient's allergies indicates:  No Known Allergies    Family History       Problem Relation (Age of Onset)    Cancer Father, Sister    Diabetes Mother    Heart defect Mother    No Known Problems Son          Tobacco Use    Smoking status: Former Smoker     Packs/day: 1.00     Years: 25.00     Pack years: 25.00     Types: Cigarettes     Quit date:      Years since quittin.3    Smokeless tobacco: Never Used   Substance and Sexual Activity    Alcohol use: Yes     Comment: ocassionally    Drug use: Never    Sexual activity: Yes     Partners: Female         Review of Systems   Constitutional:  Negative for chills and fever.   HENT:  Negative for sinus pressure and sinus pain.    Respiratory:  Positive for cough and shortness of breath.    Cardiovascular:  Negative for chest pain and leg swelling.   Gastrointestinal:  Negative for nausea and vomiting.   Genitourinary:  Negative for frequency and hematuria.   Musculoskeletal:  Negative for back pain and neck pain.   Skin:  Negative for rash and wound.   Psychiatric/Behavioral:  Negative for agitation, confusion and hallucinations.    Objective:     Vital Signs (Most Recent):  Temp: 97.8 °F (36.6 °C) (22 0800)  Pulse: 88 (22 1000)  Resp: (!) 23 (22 0906)  BP: (!) 153/79 (22 1000)  SpO2: 95 % (22 1000)   Vital Signs (24h Range):  Temp:  [97.6 °F (36.4 °C)-98.3 °F (36.8 °C)] 97.8 °F (36.6 °C)  Pulse:  [] 88  Resp:   [9-42] 23  SpO2:  [82 %-100 %] 95 %  BP: ()/() 153/79     Weight: 90.2 kg (198 lb 13.7 oz)  Body mass index is 28.53 kg/m².      Intake/Output Summary (Last 24 hours) at 5/18/2022 1013  Last data filed at 5/18/2022 1000  Gross per 24 hour   Intake 2669.03 ml   Output 4525 ml   Net -1855.97 ml       Physical Exam  Vitals and nursing note reviewed.   Constitutional:       General: He is not in acute distress.     Appearance: He is ill-appearing.      Comments: Temporal wasting   HENT:      Head: Normocephalic and atraumatic.      Mouth/Throat:      Mouth: Mucous membranes are dry.      Pharynx: Oropharynx is clear. No oropharyngeal exudate.   Eyes:      General: No scleral icterus.     Pupils: Pupils are equal, round, and reactive to light.   Cardiovascular:      Rate and Rhythm: Normal rate and regular rhythm.      Pulses: Normal pulses.      Heart sounds: Normal heart sounds.   Pulmonary:      Breath sounds: Rales present. No wheezing.   Chest:      Comments: Right chest port - accessed.   Genitourinary:     Comments: Lemon patent and draining at bedside    Musculoskeletal:      Right lower leg: Edema present.      Left lower leg: Edema present.   Skin:     General: Skin is dry.      Capillary Refill: Capillary refill takes less than 2 seconds.      Coloration: Skin is not jaundiced.      Findings: Bruising present.   Neurological:      General: No focal deficit present.      Mental Status: He is alert and oriented to person, place, and time.      GCS: GCS eye subscore is 4. GCS verbal subscore is 5. GCS motor subscore is 6.       Vents:  Vent Mode: PRVC (05/17/22 1827)  Set Rate: 20 BPM (05/17/22 1827)  Vt Set: 450 mL (05/17/22 1827)  PEEP/CPAP: 5 cmH20 (05/17/22 1827)  Oxygen Concentration (%): 80 (05/18/22 0200)  Peak Airway Pressure: 15.4 cmH2O (05/17/22 1827)  Total Ve: 9.4 mL (05/17/22 1827)  F/VT Ratio<105 (RSBI): (!) 65.71 (05/17/22 1827)    Lines/Drains/Airways       Central Venous Catheter Line   Duration             Port A Cath Single Lumen right subclavian -- days              Drain  Duration                  Urethral Catheter 05/17/22 1635 Straight-tip 18 Fr. <1 day              Peripheral Intravenous Line  Duration                  Peripheral IV - Single Lumen 05/17/22 1441 20 G;1 in Left Antecubital <1 day         Peripheral IV - Single Lumen 05/17/22 1716 18 G;1 3/4 in Right Upper Arm <1 day         Peripheral IV - Single Lumen 05/17/22 1723 18 G;1 3/4 in Right Forearm <1 day                    Significant Labs:    CBC/Anemia Profile:  Recent Labs   Lab 05/17/22  1428 05/18/22  0316   WBC 13.60* 11.03   HGB 14.1 13.9*   HCT 41.7 40.8   PLT SEE COMMENT 231   MCV 95 94   RDW 17.5* 17.1*        Chemistries:  Recent Labs   Lab 05/17/22 1428 05/18/22 0316   * 137   K 4.1 3.5    102   CO2 19* 23   BUN 9 9   CREATININE 0.9 0.8   CALCIUM 9.2 9.1   ALBUMIN 3.9 3.6   PROT 7.9 7.1   BILITOT 1.3* 1.5*   ALKPHOS 101 95   ALT 22 21   AST 42* 31   MG  --  1.5*   PHOS  --  3.6       All pertinent labs within the past 24 hours have been reviewed.    Significant Imaging:   I have reviewed all pertinent imaging results/findings within the past 24 hours.

## 2022-05-18 NOTE — ASSESSMENT & PLAN NOTE
Holding home Atenolol 100mg daily, Norvasc 10mg, and Cozaar 100mg po qday for now  He has recovered to SBP currently of 130, so will just monitor for now

## 2022-05-18 NOTE — H&P
"[facility]  History & Physical    Patient Name: Kashif Iverson  MRN: [id number]  Patient Class: IP- Inpatient  Admission Date: 5/17/2022  Attending Physician: Jennifer Singleton MD   Primary Care Provider: Eduardo Ruiz MD         Patient information was obtained from patient, spouse/SO, past medical records and ER records.     Subjective:     Principal Problem:Pneumonia    Chief Complaint:   Chief Complaint   Patient presents with    Shortness of Breath     Pt was in clinic to do some type of infusion and staff noticed his SPO2 was low so they sent him to ED. Pt has no new acute complaints and says he does not feel SOB. Pt is on O2 from clinic and SPO2 is 93% in triage.         HPI: Mr. 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  He is followed by Dr. Hathaway in Oncology, last seeing him in clinic on 4/29.22.  At that time he noted, "On 8/30/2019 he underwent bronchoscopy that showed 'a partially obstructing (about 80% obstructed) mass was found in the middle portion in the left mainstem bronchus. The mass was large and bloody, circumferential, endobronchial, friable and necrotic. 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  Completed 4 cycles and then 2/10/2021 scans showed progression.  2/24/2021 he was subsequent started on gemcitabine with Dr. Cruz."    Other medical issues include SCHF, macrocytic anemia, bronchiectasis, NSTEMI with non-obstructive CAD on [facility] (follows with Dr. Roman), MAT, and PAD.  His last TTE was on 2/23/22, and showed an estimated ejection fraction of 35-40%, normal RV systolic function (estimated PA systolic pressure is greater than 22 mmHg), and grade I left ventricular diastolic dysfunction.    He now comes to the ED after " "developing acute SOB today.  His wife is at the bedside and assists with the history.  Apparently he awoke this morning for chemo and immediately became very anxious and had, "a panic attack, like he always does on chemo days."  His wife settled him down and he improved enough to be driven to  Onc.  On walking from the parking lot to the Onc clinic he was severely dyspneic.  He was found in clinic to have an O2 sat of 85%, so he was referred to the ED.  He denies fever or chills, but does report some cough and leg swelling.  He denies N/V/D.    In the ED his VS's showed BP 90/59-->130/79 (BP Location: Left arm, Patient Position: Lying)   Pulse 111-->66   Temp Tm 97.7F, Tc 97.6 °F (36.4 °C) (Axillary)   Resp 18   Ht 5' 10" (1.778 m)   Wt 95.4 kg (210 lb 5.1 oz)   SpO2 87%-->100%  BMI 30.18 kg/m².  Labs showed WBC 13.6, PLT clumped, D-dimer 0.84, Na 135, Cr 0.9, gluc 146, TB 1.3, AT 42, LA 8.7-->4, BNP 1051, Trop 0.016, COVID negative and Flu negative.  Initial ABG showed pH 7.26, PCO2 54, PAO2 286 with follow up VBG showing pH 7.38 and PCO2 of 42.  EKG showed NSR 73, Normal sinus rhythm, incomplete right bundle branch block, and nonspecific ST and T wave abnormality.    CTA chest and CT abd/pelvis showed no evidence of pulmonary embolism.  There was a moderate pleural effusion on the right and mild pleural effusion on the left.  There is associated bilateral atelectasis and mild ground-glass infiltrate or edema.  There was airspace disease in left upper lobe and superior left lower lobe with slight nodular components.  This could represent neoplasm.  There was scarring, pneumonitis and or post treatment changes not excluded.  There was mild perinephric stranding right greater than left with minimal wall thickening of the renal pelvis on the right.  There was nondistention of the descending and sigmoid colon.  Minimal wall thickening is not excluded.    In the ED he was treated with ASA 325mg 1504, LR 1L " 1618, Vanco 2g 1735.  Due to respiratory distress, he underwent RSI and was placed on the vent - 8.0 ETT secured 24 cm @ lips. Pt was placed on PRVC: 20/450/+5/80% per ER settings. OGT was placed and confirmed in the stomach by KUB.  Shortly after arrival to the ICU room, he self-extubated and ripped out his OGT.  He was placed on BiPAP, which he refused, then placed on Venti mask.         Past Medical History:   Diagnosis Date    Hypertension     Lung cancer     NSCLC    Lung mass     left lung       Past Surgical History:   Procedure Laterality Date    BRONCHOSCOPY N/A 8/30/2019    Procedure: Bronchoscopy;  [facility];  Location: [facility] OR [address] [place] [address];  Service: Anesthesiology;  Laterality: N/A;    CORONARY ANGIOGRAPHY N/A 3/4/2022    Procedure: ANGIOGRAM, CORONARY ARTERY;  Surgeon: Robson Green MD;  Location: [facility] CATH LAB;  Service: Cardiology;  Laterality: N/A;       Review of patient's allergies indicates:  No Known Allergies    No current facility-administered medications on file prior to encounter.     Current Outpatient Medications on File Prior to Encounter   Medication Sig    amLODIPine (NORVASC) 10 MG tablet Take 1 tablet (10 mg total) by mouth once daily.    ascorbic acid-multivit-min (VITAMIN C ENERGY BOOSTER) 1,000 mg PwEP Take by mouth. Patient takes 3,000 mg per day    aspirin 81 MG Chew Take 1 tablet (81 mg total) by mouth once daily.    atenoloL (TENORMIN) 100 MG tablet Take 1 tablet (100 mg total) by mouth once daily.    atorvastatin (LIPITOR) 80 MG tablet Take 1 tablet (80 mg total) by mouth once daily.    clopidogreL (PLAVIX) 75 mg tablet Take 1 tablet (75 mg total) by mouth once daily.    furosemide (LASIX) 40 MG tablet Take 1 tablet (40 mg total) by mouth once daily.    losartan (COZAAR) 100 MG tablet Take 1 tablet (100 mg total) by mouth once daily.    LIDOcaine-prilocaine (EMLA) cream Apply generously to port site 30-60 min prior to chemo and then cover  with saran wrap. (Patient not taking: Reported on 2022)    ondansetron (ZOFRAN) 8 MG tablet Take 1 tablet (8 mg total) by mouth 4 (four) times daily as needed for Nausea. (Patient not taking: Reported on 2022)     Family History       Problem Relation (Age of Onset)    Cancer Father, Sister    Diabetes Mother    Heart defect Mother    No Known Problems Son          Tobacco Use    Smoking status: Former Smoker     Packs/day: 1.00     Years: 25.00     Pack years: 25.00     Types: Cigarettes     Quit date:      Years since quittin.3    Smokeless tobacco: Never Used   Substance and Sexual Activity    Alcohol use: Yes     Comment: ocassionally    Drug use: Never    Sexual activity: Yes     Partners: Female     Review of Systems   Constitutional:  Positive for activity change and fatigue. Negative for fever.   HENT:  Negative for congestion.    Eyes:  Negative for photophobia.   Respiratory:  Positive for cough and shortness of breath. Negative for wheezing.    Cardiovascular:  Positive for leg swelling. Negative for chest pain.   Gastrointestinal:  Negative for diarrhea, nausea and vomiting.   Endocrine: Negative for cold intolerance.   Genitourinary:  Negative for dysuria.   Musculoskeletal:  Negative for myalgias.   Skin:  Negative for wound.   Allergic/Immunologic: Positive for immunocompromised state.   Neurological:  Negative for light-headedness.   Hematological:  Does not bruise/bleed easily.   Psychiatric/Behavioral:  The patient is nervous/anxious.    All other systems reviewed and are negative.  Objective:     Vital Signs (Most Recent):  Temp: 97.8 °F (36.6 °C) (22 190)  Pulse: 72 (22)  Resp: (!) 32 (22)  BP: (!) 157/84 (22)  SpO2: (!) 94 % (22) Vital Signs (24h Range):  Temp:  [97.6 °F (36.4 °C)-97.8 °F (36.6 °C)] 97.8 °F (36.6 °C)  Pulse:  [] 72  Resp:  [9-42] 32  SpO2:  [82 %-100 %] 94 %  BP: ()/() 157/84     Weight:  95.4 kg (210 lb 5.1 oz)  Body mass index is 30.18 kg/m².    Physical Exam  Constitutional:       General: He is awake. He is not in acute distress.     Appearance: He is ill-appearing. He is not toxic-appearing or diaphoretic.   HENT:      Head: Normocephalic and atraumatic.   Eyes:      Conjunctiva/sclera:      Right eye: Right conjunctiva is not injected.      Left eye: Left conjunctiva is not injected.   Neck:      Thyroid: No thyromegaly.   Cardiovascular:      Rate and Rhythm: Normal rate and regular rhythm.      Heart sounds: No murmur heard.  Pulmonary:      Effort: Pulmonary effort is normal. No tachypnea or bradypnea.      Breath sounds: Rales present. No decreased breath sounds, wheezing or rhonchi.   Chest:      Comments: Right port-a-cath in place  Abdominal:      General: Abdomen is flat. Bowel sounds are normal. There is no distension.      Palpations: Abdomen is soft.      Tenderness: There is no abdominal tenderness.   Genitourinary:     Comments: Lemon in place with clear yellow urine  Musculoskeletal:      Cervical back: Full passive range of motion without pain.   Lymphadenopathy:      Comments: Trace edema to both legs   Skin:     Findings: No lesion or rash.   Neurological:      General: No focal deficit present.      Mental Status: He is oriented to person, place, and time. He is lethargic.      GCS: GCS eye subscore is 4. GCS verbal subscore is 5. GCS motor subscore is 6.   Psychiatric:         Attention and Perception: Perception normal. He is inattentive.         Mood and Affect: Mood is depressed. Affect is flat.         Speech: Speech normal.         Behavior: Behavior is cooperative.         Cognition and Memory: Cognition and memory normal.           Significant Labs:   Recent Results (from the past 24 hour(s))   CBC auto differential    Collection Time: 05/17/22  2:28 PM   Result Value Ref Range    WBC 13.60 (H) 3.90 - 12.70 K/uL    RBC 4.38 (L) 4.60 - 6.20 M/uL    Hemoglobin 14.1 14.0  - 18.0 g/dL    Hematocrit 41.7 40.0 - 54.0 %    MCV 95 82 - 98 fL    MCH 32.2 (H) 27.0 - 31.0 pg    MCHC 33.8 32.0 - 36.0 g/dL    RDW 17.5 (H) 11.5 - 14.5 %    Platelets SEE COMMENT 150 - 450 K/uL    MPV SEE COMMENT 9.2 - 12.9 fL    Immature Granulocytes 0.3 0.0 - 0.5 %    Gran # (ANC) 11.4 (H) 1.8 - 7.7 K/uL    Immature Grans (Abs) 0.04 0.00 - 0.04 K/uL    Lymph # 1.0 1.0 - 4.8 K/uL    Mono # 1.0 0.3 - 1.0 K/uL    Eos # 0.0 0.0 - 0.5 K/uL    Baso # 0.07 0.00 - 0.20 K/uL    nRBC 0 0 /100 WBC    Gran % 84.1 (H) 38.0 - 73.0 %    Lymph % 7.4 (L) 18.0 - 48.0 %    Mono % 7.6 4.0 - 15.0 %    Eosinophil % 0.1 0.0 - 8.0 %    Basophil % 0.5 0.0 - 1.9 %    Platelet Estimate Clumped (A)     Differential Method Automated    Comprehensive metabolic panel    Collection Time: 05/17/22  2:28 PM   Result Value Ref Range    Sodium 135 (L) 136 - 145 mmol/L    Potassium 4.1 3.5 - 5.1 mmol/L    Chloride 103 95 - 110 mmol/L    CO2 19 (L) 23 - 29 mmol/L    Glucose 146 (H) 70 - 110 mg/dL    BUN 9 8 - 23 mg/dL    Creatinine 0.9 0.5 - 1.4 mg/dL    Calcium 9.2 8.7 - 10.5 mg/dL    Total Protein 7.9 6.0 - 8.4 g/dL    Albumin 3.9 3.5 - 5.2 g/dL    Total Bilirubin 1.3 (H) 0.1 - 1.0 mg/dL    Alkaline Phosphatase 101 55 - 135 U/L    AST 42 (H) 10 - 40 U/L    ALT 22 10 - 44 U/L    Anion Gap 13 8 - 16 mmol/L    eGFR if African American >60 >60 mL/min/1.73 m^2    eGFR if non African American >60 >60 mL/min/1.73 m^2   Troponin I #1    Collection Time: 05/17/22  2:28 PM   Result Value Ref Range    Troponin I 0.016 0.000 - 0.026 ng/mL   B-Type natriuretic peptide (BNP)    Collection Time: 05/17/22  2:28 PM   Result Value Ref Range    BNP 1,051 (H) 0 - 99 pg/mL   D dimer, quantitative    Collection Time: 05/17/22  2:28 PM   Result Value Ref Range    D-Dimer 0.84 (H) <0.50 mg/L FEU   POCT Influenza A/B Molecular    Collection Time: 05/17/22  3:42 PM   Result Value Ref Range    POC Molecular Influenza A Ag Negative Negative, Not Reported    POC Molecular  Influenza B Ag Negative Negative, Not Reported     Acceptable Yes    POCT COVID-19 Rapid Screening    Collection Time: 05/17/22  4:59 PM   Result Value Ref Range    POC Rapid COVID Negative Negative     Acceptable Yes    ISTAT PROCEDURE    Collection Time: 05/17/22  5:02 PM   Result Value Ref Range    POC PH 7.261 (LL) 7.35 - 7.45    POC PCO2 54.0 (H) 35 - 45 mmHg    POC PO2 286 (H) 80 - 100 mmHg    POC HCO3 24.3 24 - 28 mmol/L    POC BE -3 -2 to 2 mmol/L    POC SATURATED O2 100 95 - 100 %    POC TCO2 26 23 - 27 mmol/L    Sample ARTERIAL     Site RR     Allens Test Pass     DelSys Adult Vent     Mode AC/PRVC     Vt 450    Lactic acid, plasma    Collection Time: 05/17/22  5:16 PM   Result Value Ref Range    Lactate (Lactic Acid) 4.0 (HH) 0.5 - 2.2 mmol/L   Procalcitonin    Collection Time: 05/17/22  5:16 PM   Result Value Ref Range    Procalcitonin 0.05 <0.25 ng/mL   ISTAT PROCEDURE    Collection Time: 05/17/22  6:27 PM   Result Value Ref Range    POC PH 7.379 7.35 - 7.45    POC PCO2 41.9 35 - 45 mmHg    POC PO2 90 (HH) 40 - 60 mmHg    POC HCO3 24.7 24 - 28 mmol/L    POC BE -1 -2 to 2 mmol/L    POC SATURATED O2 97 95 - 100 %    POC TCO2 26 24 - 29 mmol/L    Rate 20     Sample VENOUS     Site LR     Allens Test N/A     DelSys Adult Vent     Mode AC/PRVC     Vt 450     PEEP 5     FiO2 80     Sp02 100    Troponin I #2    Collection Time: 05/17/22  6:36 PM   Result Value Ref Range    Troponin I 0.043 (H) 0.000 - 0.026 ng/mL   ISTAT PROCEDURE    Collection Time: 05/17/22  8:32 PM   Result Value Ref Range    POC PH 7.427 7.35 - 7.45    POC PCO2 37.5 35 - 45 mmHg    POC PO2 51 (LL) 80 - 100 mmHg    POC HCO3 24.8 24 - 28 mmol/L    POC BE 1 -2 to 2 mmol/L    POC SATURATED O2 86 (L) 95 - 100 %    POC TCO2 26 23 - 27 mmol/L    Sample ARTERIAL     Site LR     Allens Test Pass     DelSys Venti Mask     Mode SPONT     Flow 12     FiO2 40     Sp02 94          Significant Imaging: I have reviewed all  "pertinent imaging results/findings within the past 24 hours.    Assessment/Plan:     * Pneumonia  Given his immunossupressed state, treating as bacteremia pneumonia for now  Consult Pulmonary to assist in his CT findings  Check Procal stat    His CT chest is equivocal as to whether this is PNA, pneumonitis, edema or progression of cancer:  Mild anterior left upper lobe complex fluid, thickening or low-density mass axial 181 of series 2, similar to the prior study.  Mild left upper lobe atelectasis.  Small 1.8 cm left upper lobe nodular focus on axial 133.  Superior left lower lobe posteromedial focal airspace disease and mild consolidation on axial 157, coronal 164 of series 601 and sagittal 334 series 602.  This could represent neoplasm or infection.  Follow-up recommended.  Mild ground-glass changes bilaterally may be associated with mild edema pneumonitis      Sepsis  This patient does have evidence of infective focus  My overall impression is sepsis. Vital signs were reviewed and noted in progress note.  Antibiotics given-   Antibiotics (From admission, onward)            Start     Stop Route Frequency Ordered    05/17/22 2245  azithromycin 500 mg in dextrose 5 % 250 mL IVPB (ready to mix system)         05/20 2244 IV Every 24 hours (non-standard times) 05/17/22 2132 05/17/22 2238  vancomycin - pharmacy to dose  (vancomycin IVPB)        "And" Linked Group Details    -- IV pharmacy to manage frequency 05/17/22 2138 05/17/22 1830  piperacillin-tazobactam 4.5 g in dextrose 5 % 100 mL IVPB (ready to mix system)         -- IV Every 8 hours (non-standard times) 05/17/22 1728        Cultures were taken-   Microbiology Results (last 7 days)     Procedure Component Value Units Date/Time    Culture, Respiratory with Gram Stain [[id number]] Collected: 05/17/22 2030    Order Status: Sent Specimen: Sputum, Expectorated Updated: 05/17/22 2126    Blood Culture #2 **CANNOT BE ORDERED STAT** [[id number]] Collected: 05/17/22 " 1723    Order Status: Sent Specimen: Blood from Peripheral, Forearm, Right Updated: 05/17/22 1727    Blood Culture #1 **CANNOT BE ORDERED STAT** [[id number]] Collected: 05/17/22 1716    Order Status: Sent Specimen: Blood from Peripheral, Upper Arm, Right Updated: 05/17/22 1727        Latest lactate reviewed, they are-   Latest Reference Range & Units 02/22/22 14:54 02/22/22 19:10 05/17/22 17:16   Lactate, Humberto 0.5 - 2.2 mmol/L 8.7 (HH) [1] 2.6 (H) [2] 4.0 (HH) [3]      Organ dysfunction indicated by Acute respiratory failure  Source- Lungs    Source control Achieved by- IV antibiotics      Acute respiratory failure with hypoxia and hypercapnia  Patient with Hypercapnic and Hypoxic Respiratory failure which is Acute.  he is not on home oxygen. Supplemental oxygen was provided and noted- Vent Mode: PRVC  Oxygen Concentration (%):  [] 50  Resp Rate Total:  [20 br/min-54 br/min] 54 br/min  Vt Set:  [450 mL] 450 mL  PEEP/CPAP:  [5 cmH20] 5 cmH20  Mean Airway Pressure:  [5.4 cmH20-10.7 cmH20] 10.7 cmH20.   Signs/symptoms of respiratory failure include- tachypnea, increased work of breathing and respiratory distress. Contributing diagnoses includes - CHF, COPD and Pneumonia Labs and images were reviewed. Patient Has recent ABG, which has been reviewed. Will treat underlying causes and adjust management of respiratory failure as follows:    albuterol-ipratropium 2.5 mg-0.5 mg/3 mL nebulizer 3 mL, Nebulization, Q4H PRN and Q6 scheduled     furosemide injection 40 mg, 40 mg, Intravenous, Q12H      Solumedrol 125mg iv x 1 now    [START ON 5/18/2022] predniSONE tablet 40 mg, 40 mg, Oral, Daily     Initial ABG showed pH 7.26, PCO2 54, PAO2 286 with follow up VBG showing pH 7.38 and PCO2 of 42.  On arrival to MICU he self-extubated and BiPAP was attempted, which he refused.  He is now on Venti mask with plans for follow up ABG 2 hours.    Lung cancer, main bronchus, left  Consult Oncology, Dr. 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  He is followed by Dr. Hathaway in Oncology, last seeing him in clinic on 4/29.22.  At that time he noted, "On 8/30/2019 he underwent bronchoscopy that showed 'a partially obstructing (about 80% obstructed) mass was found in the middle portion in the left mainstem bronchus. The mass was large and bloody, circumferential, endobronchial, friable and necrotic. 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  Completed 4 cycles and then 2/10/2021 scans showed progression.  2/24/2021 he was subsequent started on gemcitabine with Dr. Cruz."      Transient hypotension  Holding home Atenolol 100mg daily, Norvasc 10mg, and Cozaar 100mg po qday for now  He has recovered to SBP currently of 130, so will just monitor for now      Acute on chronic combined systolic and diastolic heart failure  His last TTE was on 2/23/22, and showed an estimated ejection fraction of 35-40%, normal RV systolic function (estimated PA systolic pressure is greater than 22 mmHg), and grade I left ventricular diastolic dysfunction.    His BNP today is 1051  Starting Lasix 40mg iv Q12 hours with care  Holding home ARB and BB due to transient hypotension      Essential hypertension  His BP is very labile  Add back in home meds as tolerated  All held as noted above, for now      Coronary artery disease involving native coronary artery of native heart without angina pectoris    [START ON 5/18/2022] aspirin chewable tablet 81 mg, 81 mg, Oral, Daily     [START ON 5/18/2022] atorvastatin tablet 80 mg, 80 mg, Oral, Daily     [START ON 5/18/2022] clopidogreL tablet 75 mg, 75 mg, Oral, Daily          PAD (peripheral artery disease)  Continue ASA and Plavix as above        VTE Risk Mitigation (From admission, onward)         Ordered     enoxaparin " injection 40 mg  Daily         05/17/22 1926     Place NASIR hose  Until discontinued         05/17/22 1926     IP VTE HIGH RISK PATIENT  Once         05/17/22 1926     Place sequential compression device  Until discontinued         05/17/22 1926              Critical care time spent on the evaluation and treatment of severe organ dysfunction, review of pertinent labs and imaging studies, discussions with consulting providers and discussions with patient/family: 80 minutes.     JOB Woodard MD  Department of Hospital Medicine   [facility] - Intensive Care

## 2022-05-18 NOTE — SUBJECTIVE & OBJECTIVE
Oncology Treatment Plan:   OP NSCLC GEMCITABINE Q3W    Medications:  Continuous Infusions:  Scheduled Meds:   albuterol-ipratropium  3 mL Nebulization Q6H    amLODIPine  10 mg Oral Daily    aspirin  81 mg Oral Daily    atorvastatin  80 mg Oral Daily    azithromycin  500 mg Intravenous Q24H    clopidogreL  75 mg Oral Daily    enoxaparin  40 mg Subcutaneous Daily    furosemide (LASIX) injection  40 mg Intravenous Q12H    piperacillin-tazobactam (ZOSYN) IVPB  4.5 g Intravenous Q8H    [START ON 2022] predniSONE  40 mg Oral Daily    vancomycin (VANCOCIN) IVPB  1,750 mg Intravenous Q12H     PRN Meds:sodium chloride 0.9%, acetaminophen, albuterol-ipratropium, aluminum-magnesium hydroxide-simethicone, influenza, naloxone, ondansetron, prochlorperazine, senna-docusate 8.6-50 mg, simethicone, sodium chloride 0.9%, Pharmacy to dose Vancomycin consult **AND** vancomycin - pharmacy to dose     Review of patient's allergies indicates:  No Known Allergies     Past Medical History:   Diagnosis Date    Combined systolic and diastolic heart failure     Hypertension     Lung cancer     NSCLC    Lung mass     left lung     Past Surgical History:   Procedure Laterality Date    BRONCHOSCOPY N/A 2019    Procedure: Bronchoscopy;  Surgeon: [facility] Diagnostic Provider;  Location: [facility] OR [address] [place] [address];  Service: Anesthesiology;  Laterality: N/A;    CORONARY ANGIOGRAPHY N/A 3/4/2022    Procedure: ANGIOGRAM, CORONARY ARTERY;  Surgeon: Robson Green MD;  Location: [facility] CATH LAB;  Service: Cardiology;  Laterality: N/A;     Family History       Problem Relation (Age of Onset)    Cancer Father, Sister    Diabetes Mother    Heart defect Mother    No Known Problems Son          Tobacco Use    Smoking status: Former Smoker     Packs/day: 1.00     Years: 25.00     Pack years: 25.00     Types: Cigarettes     Quit date:      Years since quittin.3    Smokeless tobacco: Never Used   Substance and Sexual Activity    Alcohol use: Yes      Comment: ocassionally    Drug use: Never    Sexual activity: Yes     Partners: Female       Review of Systems   Constitutional:  Negative for activity change and fatigue.   HENT:  Negative for congestion.    Respiratory:  Positive for chest tightness and shortness of breath. Negative for cough.         (Improved)    Cardiovascular:  Negative for chest pain.   Gastrointestinal:  Negative for abdominal pain.   Genitourinary:  Negative for difficulty urinating.   Musculoskeletal:  Negative for arthralgias.   Skin:  Negative for wound.   Neurological:  Negative for seizures.   Hematological:  Does not bruise/bleed easily.   Psychiatric/Behavioral:  Negative for confusion. The patient is nervous/anxious.    Objective:     Vital Signs (Most Recent):  Temp: 98 °F (36.7 °C) (05/18/22 1600)  Pulse: 69 (05/18/22 1600)  Resp: (!) 23 (05/18/22 1600)  BP: 113/74 (05/18/22 1600)  SpO2: 95 % (05/18/22 1600)   Vital Signs (24h Range):  Temp:  [97.6 °F (36.4 °C)-98.3 °F (36.8 °C)] 98 °F (36.7 °C)  Pulse:  [56-95] 69  Resp:  [9-42] 23  SpO2:  [82 %-100 %] 95 %  BP: ()/(54-99) 113/74     Weight: 90.2 kg (198 lb 13.7 oz)  Body mass index is 28.53 kg/m².  Body surface area is 2.11 meters squared.      Intake/Output Summary (Last 24 hours) at 5/18/2022 1647  Last data filed at 5/18/2022 1200  Gross per 24 hour   Intake 2712.07 ml   Output 5845 ml   Net -3132.93 ml       Physical Exam  Constitutional:       Appearance: He is ill-appearing.      Comments: Sitting up in bed, eating breakfast unassisted, conversational, appears chronically ill and older than stated age   HENT:      Head: Normocephalic.      Comments: Mild temporal wasting   Eyes:      General: No scleral icterus.  Neck:      Vascular: No carotid bruit.   Cardiovascular:      Rate and Rhythm: Normal rate.   Pulmonary:      Effort: Pulmonary effort is normal.      Breath sounds: Examination of the right-lower field reveals decreased breath sounds. Decreased breath sounds  present.      Comments: on [place] oxygen   Abdominal:      General: Bowel sounds are normal.   Musculoskeletal:      Right lower leg: Edema present.      Left lower leg: Edema present.   Skin:     General: Skin is warm.   Neurological:      General: No focal deficit present.      Mental Status: He is alert and oriented to person, place, and time.   Psychiatric:         Mood and Affect: Mood normal.         Behavior: Behavior normal.       Significant Labs:   I have reviewed and interpreted all pertinent imaging results/findings within the past 24 hours     Diagnostic Results:    Impression:   CT c/a/p w/contrast 5/17/22  1. No evidence of pulmonary embolism.  Limited visualization.  See above comments.  2. Moderate pleural effusion on the right and mild pleural effusion on the left.  There is associated bilateral atelectasis and mild ground-glass infiltrate or edema.  Airspace disease in left upper lobe and superior left lower lobe with slight nodular components.  This could represent neoplasm..  Scarring, pneumonitis and or post treatment changes not excluded.  See above comments.  Follow-up recommended.  3. Mild perinephric stranding right greater than left with minimal wall thickening of the renal pelvis on the right.  Mild inflammatory or infectious process is not excluded.  4. Nondistention of the descending and sigmoid colon.  Minimal wall thickening is not excluded.

## 2022-05-18 NOTE — ASSESSMENT & PLAN NOTE
- Diuresis and management per primary team; has 5L UOP charted in last 24 hours, with improvement of respiratory status

## 2022-05-18 NOTE — SUBJECTIVE & OBJECTIVE
Past Medical History:   Diagnosis Date    Hypertension     Lung cancer     NSCLC    Lung mass     left lung       Past Surgical History:   Procedure Laterality Date    BRONCHOSCOPY N/A 2019    Procedure: Bronchoscopy;  Surgeon: [facility] Provider;  Location: [facility] OR [address] [place] [address];  Service: Anesthesiology;  Laterality: N/A;    CORONARY ANGIOGRAPHY N/A 3/4/2022    Procedure: ANGIOGRAM, CORONARY ARTERY;  Surgeon: Robson Green MD;  Location: [facility] CATH LAB;  Service: Cardiology;  Laterality: N/A;       Review of patient's allergies indicates:  No Known Allergies    Medications:  Continuous Infusions:  Scheduled Meds:   albuterol-ipratropium  3 mL Nebulization Q6H    amLODIPine  10 mg Oral Daily    aspirin  81 mg Oral Daily    atorvastatin  80 mg Oral Daily    azithromycin  500 mg Intravenous Q24H    clopidogreL  75 mg Oral Daily    enoxaparin  40 mg Subcutaneous Daily    furosemide (LASIX) injection  40 mg Intravenous Q12H    piperacillin-tazobactam (ZOSYN) IVPB  4.5 g Intravenous Q8H    predniSONE  40 mg Oral Daily    vancomycin (VANCOCIN) IVPB  1,750 mg Intravenous Q12H     PRN Meds:sodium chloride 0.9%, acetaminophen, albuterol-ipratropium, aluminum-magnesium hydroxide-simethicone, influenza, naloxone, ondansetron, prochlorperazine, senna-docusate 8.6-50 mg, simethicone, sodium chloride 0.9%, Pharmacy to dose Vancomycin consult **AND** vancomycin - pharmacy to dose    Family History       Problem Relation (Age of Onset)    Cancer Father, Sister    Diabetes Mother    Heart defect Mother    No Known Problems Son          Tobacco Use    Smoking status: Former Smoker     Packs/day: 1.00     Years: 25.00     Pack years: 25.00     Types: Cigarettes     Quit date:      Years since quittin.3    Smokeless tobacco: Never Used   Substance and Sexual Activity    Alcohol use: Yes     Comment: ocassionally    Drug use: Never    Sexual activity: Yes     Partners: Female       Review of Systems   Constitutional:   Negative for activity change and fatigue.   HENT:  Negative for congestion.    Respiratory:  Positive for chest tightness and shortness of breath.         (Improved)    Cardiovascular:  Negative for chest pain.   Gastrointestinal:  Negative for abdominal pain.   Genitourinary:  Negative for difficulty urinating.   Musculoskeletal:  Negative for arthralgias.   Skin:  Negative for wound.   Neurological:  Negative for seizures.   Hematological:  Does not bruise/bleed easily.   Psychiatric/Behavioral:  Negative for confusion.    Objective:     Vital Signs (Most Recent):  Temp: 97.8 °F (36.6 °C) (05/18/22 0800)  Pulse: 88 (05/18/22 1000)  Resp: (!) 23 (05/18/22 0906)  BP: (!) 153/79 (05/18/22 1000)  SpO2: 95 % (05/18/22 1000)   Vital Signs (24h Range):  Temp:  [97.6 °F (36.4 °C)-98.3 °F (36.8 °C)] 97.8 °F (36.6 °C)  Pulse:  [] 88  Resp:  [9-42] 23  SpO2:  [82 %-100 %] 95 %  BP: ()/() 153/79     Weight: 90.2 kg (198 lb 13.7 oz)  Body mass index is 28.53 kg/m².    Physical Exam  Constitutional:       Comments: Sitting up in bed, eating breakfast unassisted, conversational, appears chronically ill and older than stated age   HENT:      Head:      Comments: Mild temporal wasting      Nose: Nose normal.      Mouth/Throat:      Mouth: Mucous membranes are moist.   Eyes:      Extraocular Movements: Extraocular movements intact.   Cardiovascular:      Rate and Rhythm: Normal rate.   Pulmonary:      Comments: Breathing appears comfortable on NC oxygen   Skin:     General: Skin is warm.   Neurological:      General: No focal deficit present.      Mental Status: He is oriented to person, place, and time.   Psychiatric:         Mood and Affect: Mood normal.         Behavior: Behavior normal.       Significant Labs: All pertinent labs within the past 24 hours have been reviewed.  CBC:   Recent Labs   Lab 05/18/22 0316   WBC 11.03   HGB 13.9*   HCT 40.8   MCV 94        BMP:  Recent Labs   Lab 05/18/22 0316    *      K 3.5      CO2 23   BUN 9   CREATININE 0.8   CALCIUM 9.1   MG 1.5*     LFT:  Lab Results   Component Value Date    AST 31 05/18/2022    ALKPHOS 95 05/18/2022    BILITOT 1.5 (H) 05/18/2022     Albumin:   Albumin   Date Value Ref Range Status   05/18/2022 3.6 3.5 - 5.2 g/dL Final     Protein:   Total Protein   Date Value Ref Range Status   05/18/2022 7.1 6.0 - 8.4 g/dL Final     Lactic acid:   Lab Results   Component Value Date    LACTATE 4.0 (HH) 05/17/2022    LACTATE 2.6 (H) 02/22/2022       Significant Imaging: I have reviewed all pertinent imaging results/findings within the past 24 hours.

## 2022-05-18 NOTE — PLAN OF CARE
Patient remains in ICU. Oriented to person and time. Needs reminders to situation and place. Pt extubated himself at start of shift. See prior nursing note for details. Based on ABGs ordered and results, pt currently on 15L HFNC. Sats 100% on monitor but due to low PO2 MD stated to keep on HFNC. Order for Bipap d/c'd. NSR/SB on cardiac monitor. Afebrile. BP stable. Lemon maintained to gravity, pt with >4L UO this shift. Abx administered as ordered. K of 3.5 and Mg 1.5 reported to MD Woodard. Ordered 2g Mg Sulfate and 10 mEq KCL IVPB replacements. Skin tear to sacral cleft noted at start of shift/admission. LDA placed in chart and sacral foam in place, CDI. Pt repositions in bed independently and encouraged to turn q2 hr. No falls, injury, or new skin breakdown this shift. Plan of care reviewed with pt and pt wife at bedside.

## 2022-05-18 NOTE — ASSESSMENT & PLAN NOTE
"This patient does have evidence of infective focus  My overall impression is sepsis. Vital signs were reviewed and noted in progress note.  Antibiotics given-   Antibiotics (From admission, onward)            Start     Stop Route Frequency Ordered    05/18/22 0530  vancomycin (VANCOCIN) 1,750 mg in dextrose 5 % 500 mL IVPB         -- IV Every 12 hours (non-standard times) 05/17/22 2148 05/17/22 2245  azithromycin 500 mg in dextrose 5 % 250 mL IVPB (ready to mix system)         05/20 2244 IV Every 24 hours (non-standard times) 05/17/22 2132 05/17/22 2238  vancomycin - pharmacy to dose  (vancomycin IVPB)        "And" Linked Group Details    -- IV pharmacy to manage frequency 05/17/22 2138 05/17/22 1830  piperacillin-tazobactam 4.5 g in dextrose 5 % 100 mL IVPB (ready to mix system)         -- IV Every 8 hours (non-standard times) 05/17/22 1728        Cultures were taken-   Microbiology Results (last 7 days)     Procedure Component Value Units Date/Time    Culture, Respiratory with Gram Stain [[id number]] Collected: 05/17/22 2030    Order Status: Completed Specimen: Sputum, Expectorated Updated: 05/18/22 1158     Respiratory Culture Normal respiratory maksim     Gram Stain (Respiratory) Few Gram positive cocci in clusters     Gram Stain (Respiratory) <10 epithelial cells per low power field.     Gram Stain (Respiratory) No WBC's    Blood Culture #1 **CANNOT BE ORDERED STAT** [[id number]] Collected: 05/17/22 1716    Order Status: Completed Specimen: Blood from Peripheral, Upper Arm, Right Updated: 05/18/22 0312     Blood Culture, Routine No Growth to date    Blood Culture #2 **CANNOT BE ORDERED STAT** [[id number]] Collected: 05/17/22 1723    Order Status: Completed Specimen: Blood from Peripheral, Forearm, Right Updated: 05/18/22 0312     Blood Culture, Routine No Growth to date        Latest lactate reviewed, they are-   Latest Reference Range & Units 02/22/22 14:54 02/22/22 19:10 05/17/22 17:16   Lactate, Humberto 0.5 " - 2.2 mmol/L 8.7 (HH) [1] 2.6 (H) [2] 4.0 (HH) [3]      Organ dysfunction indicated by Acute respiratory failure  Source- Lungs    Source control Achieved by- IV antibiotics

## 2022-05-18 NOTE — CONSULTS
[facility] - Intensive Care  Palliative Medicine  Consult Note    Patient Name: Kashif Iverson  MRN: [id number]  Admission Date: 5/17/2022  Hospital Length of Stay: 1 days  Code Status: Full Code   Attending Provider: Jennifer Singleton MD  Consulting Provider: Tomasa Knott MD  Primary Care Physician: Eduardo Ruiz MD  Principal Problem:Pneumonia    Patient information was obtained from patient, past medical records, ER records and primary team.      Inpatient consult to Palliative Care  Consult performed by: Tomasa Knott MD  Consult ordered by: KHUSHBU Woodard MD  Reason for consult: Advance Care Planning         Assessment/Plan:     Pneumonia  - Likely post-obstructive; abx per primary team     Advance Care Planning        5/18/22  - Consult for advance care planning and continuity of care in pt with underlying metastatic lung cancer (on palliative chemotherapy) admitted to the ICU with acute respiratory failure; he was emergently intubated in the ER, though self-extubated several hours later and is doing well on NC oxygen. Etiology of respiratory failure believed to be due to pleural effusions/volume overload and pneumonia. He has diuresed well in last 24 hours (5L UOP), and is also on antibiotics. Chart reviewed in depth; discussed pt during MDT rounds. Pt has previously been seen by our team   - Along with Jovan Baca (ICU NP), met with pt at bedside. He was awake, sitting up in bed eating breakfast, and able to have a full conversation. Reminded him of role of palliative medicine, and discussed how things have been going outside of hospital.   - He said other than this acute episode, he's been doing well. He lives with his wife (legal MPOA) and his son. At baseline, he says he is ambulatory and is not on home oxygen.   - Medical update provided; discussed our transparent concern that pt has now been on life support twice in the last 3 months, which makes us worried that this will occur again. There are  instances where he is home alone, which we said is not safe. Additionally, he has very limited insight into his chronic illnesses, and does not know any of his home medications (said his wife puts his pills in pill container).   - He said it was frightening to have this most recent episode occur; support provided. He verbalized several times that he wants to live; care goals are consistent with aggressive medical care/continuing oncological-focused treatment as long as he is a candidate.   - However, should his preferences change at any point, he does qualify for hospice care. Did not discuss this during first visit, as pt seemed quite overwhelmed by last 24 hours.   - Will try to call his wife in next 24 hours; our team will follow up with pt while in hospital     Acute respiratory failure with hypoxia and hypercapnia  - Was emergently intubated in ER; etiology likely multifactorial (pleural effusions/volume overload, pneumonia, underlying lung cancer)  - Self-extubated several hours later and doing well on NC oxygen   - PCCM consulted and managing   - Contributes to overall prognosis of 6 months or less, given that pt has now been on life support twice in last 3 months     Sepsis  - Likely pneumonia as source; abx per primary team     Coronary artery disease involving native coronary artery of native heart without angina pectoris  - Noted underlying disease     PAD (peripheral artery disease)  - Noted underlying disease     Acute on chronic combined systolic and diastolic heart failure  - Diuresis and management per primary team; has 5L UOP charted in last 24 hours, with improvement of respiratory status     Secondary malignant neoplasm of bone  - Metastatic lung cancer    Lung cancer, main bronchus, left  - Follows with Dr Hathaway outpatient; on palliative chemotherapy. Provided update to his oncologist.     Thank you for your consult. I will follow-up with patient. Please contact us if you have any additional  "questions.     DELPHINE Granados  Palliative Medicine Staff   [phone]    Total visit time: 86 minutes    > 50% of 70 min visit spent in chart review, face to face discussion of symptom assessment, coordination of care with other specialists, and discharge planning.    16 min ACP time spent: goals of care, emotional support, formulating and communicating prognosis, exploring burden/ benefit of various approaches of treatment.     Subjective:     HPI:   (From H&P) "Mr. 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  He is followed by Dr. Hathaway in Oncology, last seeing him in clinic on 4/29.22.  At that time he noted, "On 8/30/2019 he underwent bronchoscopy that showed 'a partially obstructing (about 80% obstructed) mass was found in the middle portion in the left mainstem bronchus. The mass was large and bloody, circumferential, endobronchial, friable and necrotic. 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  Completed 4 cycles and then 2/10/2021 scans showed progression.  2/24/2021 he was subsequent started on gemcitabine with Dr. Cruz." Other medical issues include SCHF, macrocytic anemia, bronchiectasis, NSTEMI with non-obstructive CAD on [facility] (follows with Dr. Roman), MAT, and PAD.  His last TTE was on 2/23/22, and showed an estimated ejection fraction of 35-40%, normal RV systolic function (estimated PA systolic pressure is greater than 22 mmHg), and grade I left ventricular diastolic dysfunction. He now comes to the ED after developing acute SOB today.  His wife is at the bedside and assists with the history.  Apparently he awoke this morning for chemo and immediately became very anxious and had, "a panic attack, like he always does on chemo days."  His wife settled him down " "and he improved enough to be driven to [facility].  On walking from the parking lot to the Onc clinic he was severely dyspneic.  He was found in clinic to have an O2 sat of 85%, so he was referred to the ED.  He denies fever or chills, but does report some cough and leg swelling.  He denies N/V/D. In the ED his VS's showed BP 90/59-->130/79 (BP Location: Left arm, Patient Position: Lying)   Pulse 111-->66   Temp Tm 97.7F, Tc 97.6 °F (36.4 °C) (Axillary)   Resp 18   Ht 5' 10" (1.778 m)   Wt 95.4 kg (210 lb 5.1 oz)   SpO2 87%-->100%  BMI 30.18 kg/m².  Labs showed WBC 13.6, PLT clumped, D-dimer 0.84, Na 135, Cr 0.9, gluc 146, TB 1.3, AT 42, LA 8.7-->4, BNP 1051, Trop 0.016, COVID negative and Flu negative.  Initial ABG showed pH 7.26, PCO2 54, PAO2 286 with follow up VBG showing pH 7.38 and PCO2 of 42.  EKG showed NSR 73, Normal sinus rhythm, incomplete right bundle branch block, and nonspecific ST and T wave abnormality. CTA chest and CT abd/pelvis showed no evidence of pulmonary embolism.  There was a moderate pleural effusion on the right and mild pleural effusion on the left.  There is associated bilateral atelectasis and mild ground-glass infiltrate or edema.  There was airspace disease in left upper lobe and superior left lower lobe with slight nodular components.  This could represent neoplasm.  There was scarring, pneumonitis and or post treatment changes not excluded.  There was mild perinephric stranding right greater than left with minimal wall thickening of the renal pelvis on the right.  There was nondistention of the descending and sigmoid colon.  Minimal wall thickening is not excluded. In the ED he was treated with ASA 325mg 1504, LR 1L 1618, Vanco 2g 1735.  Due to respiratory distress, he underwent RSI and was placed on the vent - 8.0 ETT secured 24 cm @ lips. Pt was placed on PRVC: 20/450/+5/80% per ER settings. OGT was placed and confirmed in the stomach by KUB.  Shortly after arrival to the ICU " "room, he self-extubated and ripped out his OGT. He was placed on BiPAP, which he refused, then placed on Venti mask."     Palliative medicine is consulted for advance care planning, given pt's underlying metastatic cancer and current ICU stay. Met with pt at bedside; for details of discussion, see advance care planning section of plan.       Past Medical History:   Diagnosis Date    Hypertension     Lung cancer     NSCLC    Lung mass     left lung       Past Surgical History:   Procedure Laterality Date    BRONCHOSCOPY N/A 8/30/2019    Procedure: Bronchoscopy;  [facility];  Location[facility] OR [address] [place] [address];  Service: Anesthesiology;  Laterality: N/A;    CORONARY ANGIOGRAPHY N/A 3/4/2022    Procedure: ANGIOGRAM, CORONARY ARTERY;  Surgeon: Robson Green MD;  Location: [facility] CATH LAB;  Service: Cardiology;  Laterality: N/A;       Review of patient's allergies indicates:  No Known Allergies    Medications:  Continuous Infusions:  Scheduled Meds:   albuterol-ipratropium  3 mL Nebulization Q6H    amLODIPine  10 mg Oral Daily    aspirin  81 mg Oral Daily    atorvastatin  80 mg Oral Daily    azithromycin  500 mg Intravenous Q24H    clopidogreL  75 mg Oral Daily    enoxaparin  40 mg Subcutaneous Daily    furosemide (LASIX) injection  40 mg Intravenous Q12H    piperacillin-tazobactam (ZOSYN) IVPB  4.5 g Intravenous Q8H    predniSONE  40 mg Oral Daily    vancomycin (VANCOCIN) IVPB  1,750 mg Intravenous Q12H     PRN Meds:sodium chloride 0.9%, acetaminophen, albuterol-ipratropium, aluminum-magnesium hydroxide-simethicone, influenza, naloxone, ondansetron, prochlorperazine, senna-docusate 8.6-50 mg, simethicone, sodium chloride 0.9%, Pharmacy to dose Vancomycin consult **AND** vancomycin - pharmacy to dose    Family History       Problem Relation (Age of Onset)    Cancer Father, Sister    Diabetes Mother    Heart defect Mother    No Known Problems Son          Tobacco Use    Smoking status: " Former Smoker     Packs/day: 1.00     Years: 25.00     Pack years: 25.00     Types: Cigarettes     Quit date:      Years since quittin.3    Smokeless tobacco: Never Used   Substance and Sexual Activity    Alcohol use: Yes     Comment: ocassionally    Drug use: Never    Sexual activity: Yes     Partners: Female       Review of Systems   Constitutional:  Negative for activity change and fatigue.   HENT:  Negative for congestion.    Respiratory:  Positive for chest tightness and shortness of breath.         (Improved)    Cardiovascular:  Negative for chest pain.   Gastrointestinal:  Negative for abdominal pain.   Genitourinary:  Negative for difficulty urinating.   Musculoskeletal:  Negative for arthralgias.   Skin:  Negative for wound.   Neurological:  Negative for seizures.   Hematological:  Does not bruise/bleed easily.   Psychiatric/Behavioral:  Negative for confusion.    Objective:     Vital Signs (Most Recent):  Temp: 97.8 °F (36.6 °C) (22 0800)  Pulse: 88 (22 1000)  Resp: (!) 23 (22 0906)  BP: (!) 153/79 (22 1000)  SpO2: 95 % (22 1000)   Vital Signs (24h Range):  Temp:  [97.6 °F (36.4 °C)-98.3 °F (36.8 °C)] 97.8 °F (36.6 °C)  Pulse:  [] 88  Resp:  [9-42] 23  SpO2:  [82 %-100 %] 95 %  BP: ()/() 153/79     Weight: 90.2 kg (198 lb 13.7 oz)  Body mass index is 28.53 kg/m².    Physical Exam  Constitutional:       Comments: Sitting up in bed, eating breakfast unassisted, conversational, appears chronically ill and older than stated age   HENT:      Head:      Comments: Mild temporal wasting      Nose: Nose normal.      Mouth/Throat:      Mouth: Mucous membranes are moist.   Eyes:      Extraocular Movements: Extraocular movements intact.   Cardiovascular:      Rate and Rhythm: Normal rate.   Pulmonary:      Comments: Breathing appears comfortable on NC oxygen   Skin:     General: Skin is warm.   Neurological:      General: No focal deficit present.      Mental  Status: He is oriented to person, place, and time.   Psychiatric:         Mood and Affect: Mood normal.         Behavior: Behavior normal.       Significant Labs: All pertinent labs within the past 24 hours have been reviewed.  CBC:   Recent Labs   Lab 05/18/22 0316   WBC 11.03   HGB 13.9*   HCT 40.8   MCV 94        BMP:  Recent Labs   Lab 05/18/22 0316   *      K 3.5      CO2 23   BUN 9   CREATININE 0.8   CALCIUM 9.1   MG 1.5*     LFT:  Lab Results   Component Value Date    AST 31 05/18/2022    ALKPHOS 95 05/18/2022    BILITOT 1.5 (H) 05/18/2022     Albumin:   Albumin   Date Value Ref Range Status   05/18/2022 3.6 3.5 - 5.2 g/dL Final     Protein:   Total Protein   Date Value Ref Range Status   05/18/2022 7.1 6.0 - 8.4 g/dL Final     Lactic acid:   Lab Results   Component Value Date    LACTATE 4.0 (HH) 05/17/2022    LACTATE 2.6 (H) 02/22/2022       Significant Imaging: I have reviewed all pertinent imaging results/findings within the past 24 hours.

## 2022-05-18 NOTE — NURSING
[facility] [place] 4 [place]      5/18/2022       Skin assessed on: Q Shift      [] No Pressure Injuries Present    []Prevention Measures Documented    [] Yes LDA Previously documented     [x] Yes New Pressure Injury Discovered (pt admitted today, this is present on admit)   [x] LDA Added      Attending RN:  Sherri Rose, RN     Second RN:  NEERAJ Georges

## 2022-05-18 NOTE — SUBJECTIVE & OBJECTIVE
Interval History: No complaints today. He does not feel short of breath currently. He has nasal congestion.     Review of Systems   Constitutional:  Negative for chills and fever.   HENT:  Positive for congestion.    Respiratory:  Negative for chest tightness, shortness of breath and wheezing.    Cardiovascular:  Negative for chest pain, palpitations and leg swelling.   Gastrointestinal:  Negative for abdominal distention, nausea and vomiting.   Genitourinary:  Negative for difficulty urinating.   Musculoskeletal:  Negative for arthralgias and myalgias.   Neurological:  Negative for weakness and numbness.   Psychiatric/Behavioral:  Negative for confusion.    Objective:     Vital Signs (Most Recent):  Temp: 97.8 °F (36.6 °C) (05/18/22 0800)  Pulse: 82 (05/18/22 1309)  Resp: 20 (05/18/22 1309)  BP: (!) 142/74 (05/18/22 1200)  SpO2: 98 % (05/18/22 1309)   Vital Signs (24h Range):  Temp:  [97.6 °F (36.4 °C)-98.3 °F (36.8 °C)] 97.8 °F (36.6 °C)  Pulse:  [] 82  Resp:  [9-42] 20  SpO2:  [82 %-100 %] 98 %  BP: ()/() 142/74     Weight: 90.2 kg (198 lb 13.7 oz)  Body mass index is 28.53 kg/m².    Intake/Output Summary (Last 24 hours) at 5/18/2022 1320  Last data filed at 5/18/2022 1200  Gross per 24 hour   Intake 2712.07 ml   Output 5845 ml   Net -3132.93 ml      Physical Exam  Vitals and nursing note reviewed.   Constitutional:       General: He is not in acute distress.     Appearance: He is not toxic-appearing.   HENT:      Head: Normocephalic and atraumatic.      Nose: Nose normal.      Mouth/Throat:      Mouth: Mucous membranes are moist.   Cardiovascular:      Rate and Rhythm: Normal rate and regular rhythm.      Pulses: Normal pulses.      Heart sounds: Normal heart sounds. No murmur heard.    No gallop.   Pulmonary:      Effort: Pulmonary effort is normal. No respiratory distress.      Breath sounds: No wheezing or rales.      Comments: 3L HFNC. Becomes tachypneic when speaking for prolonged period.  Diminished on right side  Abdominal:      General: Bowel sounds are normal. There is no distension.      Palpations: Abdomen is soft.      Tenderness: There is no abdominal tenderness. There is no guarding.   Musculoskeletal:      Right lower leg: No edema.      Left lower leg: Edema present.   Skin:     General: Skin is warm and dry.   Neurological:      Mental Status: He is alert and oriented to person, place, and time.       Significant Labs: All pertinent labs within the past 24 hours have been reviewed.    Significant Imaging: I have reviewed all pertinent imaging results/findings within the past 24 hours.

## 2022-05-18 NOTE — EICU
Rounding (Video Assessment):  Yes    Intervention Initiated From:  COR / EICU    Mignon Communicated with Bedside Nurse regarding:  Respiratory    Doctor Notified:  Yes    Comments: Pt self extubated.  Informed MD

## 2022-05-18 NOTE — ASSESSMENT & PLAN NOTE
-- Home antihypertensives initially held due to labile blood pressure. Will resume as clinically indicated.   -- Holding home BB in setting of bradycardia overnight

## 2022-05-18 NOTE — ASSESSMENT & PLAN NOTE
Most recent ECHO:     · The left ventricle is normal in size with concentric hypertrophy and moderately decreased systolic function.  · The estimated ejection fraction is 35-40%.  · Grade I left ventricular diastolic dysfunction.  · Mild right ventricular enlargement with normal right ventricular systolic function.  · Mild left atrial enlargement.  · Mild right atrial enlargement.  · Mild tricuspid regurgitation.    Bilateral pleural effusions likely in setting of HF and questionable medication compliance.      -- Continue Lasix IV BID   -- Strict I&O   -- Follow up AM XRAY   -- ? Utility of thoracentesis. Will defer at this time.   -- Telemetry   -- Resume home antihypertensives as clinically indicated   -- Holding beta blocker in setting of bradycardia overnight. Will resume as able / clinically indicated.

## 2022-05-18 NOTE — ASSESSMENT & PLAN NOTE
"This patient does have evidence of infective focus  My overall impression is sepsis. Vital signs were reviewed and noted in progress note.  Antibiotics given-   Antibiotics (From admission, onward)            Start     Stop Route Frequency Ordered    05/18/22 0530  vancomycin (VANCOCIN) 1,750 mg in dextrose 5 % 500 mL IVPB         -- IV Every 12 hours (non-standard times) 05/17/22 2148 05/17/22 2245  azithromycin 500 mg in dextrose 5 % 250 mL IVPB (ready to mix system)         05/20 2244 IV Every 24 hours (non-standard times) 05/17/22 2132 05/17/22 2238  vancomycin - pharmacy to dose  (vancomycin IVPB)        "And" Linked Group Details    -- IV pharmacy to manage frequency 05/17/22 2138 05/17/22 1830  piperacillin-tazobactam 4.5 g in dextrose 5 % 100 mL IVPB (ready to mix system)         -- IV Every 8 hours (non-standard times) 05/17/22 1728        Cultures were taken-   Microbiology Results (last 7 days)     Procedure Component Value Units Date/Time    Culture, Respiratory with Gram Stain [[id number]] Collected: 05/17/22 2030    Order Status: Completed Specimen: Sputum, Expectorated Updated: 05/18/22 0824     Gram Stain (Respiratory) Few Gram positive cocci in clusters     Gram Stain (Respiratory) <10 epithelial cells per low power field.     Gram Stain (Respiratory) No WBC's    Blood Culture #1 **CANNOT BE ORDERED STAT** [[id number]] Collected: 05/17/22 1716    Order Status: Completed Specimen: Blood from Peripheral, Upper Arm, Right Updated: 05/18/22 0312     Blood Culture, Routine No Growth to date    Blood Culture #2 **CANNOT BE ORDERED STAT** [[id number]] Collected: 05/17/22 1723    Order Status: Completed Specimen: Blood from Peripheral, Forearm, Right Updated: 05/18/22 0312     Blood Culture, Routine No Growth to date        Latest lactate reviewed, they are-  Recent Labs   Lab 05/17/22 1716   LACTATE 4.0*       Organ dysfunction indicated by Acute respiratory failure  Source- lung     Source control " Achieved by- broad spectrum abx

## 2022-05-18 NOTE — PLAN OF CARE
Problem: Adult Inpatient Plan of Care  Goal: Plan of Care Review  Outcome: Ongoing, Progressing  Goal: Patient-Specific Goal (Individualized)  Outcome: Ongoing, Progressing  Goal: Absence of Hospital-Acquired Illness or Injury  Outcome: Ongoing, Progressing  Goal: Optimal Comfort and Wellbeing  Outcome: Ongoing, Progressing  Goal: Readiness for Transition of Care  Outcome: Ongoing, Progressing     Problem: Infection  Goal: Absence of Infection Signs and Symptoms  Outcome: Ongoing, Progressing     Problem: Impaired Wound Healing  Goal: Optimal Wound Healing  Outcome: Ongoing, Progressing     Problem: Communication Impairment (Mechanical Ventilation, Invasive)  Goal: Effective Communication  Outcome: Ongoing, Progressing     Problem: Device-Related Complication Risk (Mechanical Ventilation, Invasive)  Goal: Optimal Device Function  Outcome: Ongoing, Progressing     Problem: Inability to Wean (Mechanical Ventilation, Invasive)  Goal: Mechanical Ventilation Liberation  Outcome: Ongoing, Progressing     Problem: Nutrition Impairment (Mechanical Ventilation, Invasive)  Goal: Optimal Nutrition Delivery  Outcome: Ongoing, Progressing     Problem: Skin and Tissue Injury (Mechanical Ventilation, Invasive)  Goal: Absence of Device-Related Skin and Tissue Injury  Outcome: Ongoing, Progressing     Problem: Ventilator-Induced Lung Injury (Mechanical Ventilation, Invasive)  Goal: Absence of Ventilator-Induced Lung Injury  Outcome: Ongoing, Progressing     Problem: Communication Impairment (Artificial Airway)  Goal: Effective Communication  Outcome: Ongoing, Progressing     Problem: Device-Related Complication Risk (Artificial Airway)  Goal: Optimal Device Function  Outcome: Ongoing, Progressing     Problem: Skin and Tissue Injury (Artificial Airway)  Goal: Absence of Device-Related Skin or Tissue Injury  Outcome: Ongoing, Progressing     Problem: Noninvasive Ventilation Acute  Goal: Effective Unassisted Ventilation and  Oxygenation  Outcome: Ongoing, Progressing     Problem: Fall Injury Risk  Goal: Absence of Fall and Fall-Related Injury  Outcome: Ongoing, Progressing     Problem: Coping Ineffective  Goal: Effective Coping  Outcome: Ongoing, Progressing     Problem: Restraint, Nonbehavioral (Nonviolent)  Goal: Absence of Harm or Injury  Outcome: Ongoing, Progressing     Problem: Skin Injury Risk Increased  Goal: Skin Health and Integrity  Outcome: Ongoing, Progressing

## 2022-05-18 NOTE — ASSESSMENT & PLAN NOTE
Patient admitted with shortness of breath, edema consistent with acute on chronic systolic and diastolic CHF. Cause of exacerbation is unclear     His last TTE was on 2/23/22, and showed an estimated ejection fraction of 35-40%, normal RV systolic function (estimated PA systolic pressure is greater than 22 mmHg), and grade I left ventricular diastolic dysfunction.    BNP  Recent Labs   Lab 05/17/22  1428   BNP 1,051*     Start on IV lasix diuresis. Monitor ins and outs  TTE pending   Nutrition consulted for low salt cardiac diet education  Cardiology follow up on discharge

## 2022-05-18 NOTE — ASSESSMENT & PLAN NOTE
Given his immunossupressed state, treating as bacteremia pneumonia for now  Consult Pulmonary to assist in his CT findings  Check Procal stat    His CT chest is equivocal as to whether this is PNA, pneumonitis, edema or progression of cancer:  Mild anterior left upper lobe complex fluid, thickening or low-density mass axial 181 of series 2, similar to the prior study.  Mild left upper lobe atelectasis.  Small 1.8 cm left upper lobe nodular focus on axial 133.  Superior left lower lobe posteromedial focal airspace disease and mild consolidation on axial 157, coronal 164 of series 601 and sagittal 334 series 602.  This could represent neoplasm or infection.  Follow-up recommended.  Mild ground-glass changes bilaterally may be associated with mild edema pneumonitis

## 2022-05-18 NOTE — ASSESSMENT & PLAN NOTE
- Was emergently intubated in ER; etiology likely multifactorial (pleural effusions/volume overload, pneumonia, underlying lung cancer)  - Self-extubated several hours later and doing well on NC oxygen   - PCCM consulted and managing   - Contributes to overall prognosis of 6 months or less, given that pt has now been on life support twice in last 3 months

## 2022-05-18 NOTE — HPI
"Mr. Iverson is a 61 year old male with significant PMH notable for HTN, HF (most recent EF 35-40%, G1DD), CAD, previous NSTEMI, anemia, bronchiectasis, 25 year pack year cigarette history, and NSCLC (followed by Dr. Hathaway, diagnosed in 2019) who presented to  ED with shortness of breath and respiratory distress. Per chart review, his wife at bedside states that he woke up yesterday morning very anxious and with worsening dyspnea. Per chart review, his wife states he had "a panic attack like he always does on chemo days."     In the emergency department he was normotensive, tachycardic, and afebrile. COVID and Flu negative. Initial labs work up demonstrates WBC 13.6,  D-dimer 0.84, Na 135, Cr 0.9, gluc 146, TB 1.3, AT 42, LA 8.7-->4, BNP 1051, and Trop 0.016. NSR on EKG. CTA without PE. CT with bilateral pleural effusions (present on previous imaging) R > L, atelectasis, pulm nodules. There was mild perinephric stranding right greater than left with minimal wall thickening of the renal pelvis on the right, and nondistention of the descending and sigmoid colon.      In the emergency department he was given ASA, IVF, started on broad spectrum abx and an infectious work up was sent. Given his respiratory distress he was intubated and placed on the ventilator. After arrival to the ICU, patient self extubated and was placed on BiPAP, and shortly after transitioned to nasal cannula.     In speaking with patient this morning, he reports that he felt as if he were "suffocating" yesterday with worsening anxiety than usual. He reports that this has occurred several times over the last few months. He is unsure of his medication use (I.e. if he was taking his prescribed Lasix 40mg).   "

## 2022-05-18 NOTE — PROVIDER TRANSFER
Mr Kashif Iverson is a 61 y.o. man admitted with acute hypoxic respiratory failure. He has known SCLC on palliative chemotherapy and systolic/diastolic CHF. He was briefly intubated on 5/18 but self extubated a few hours later. Started IV lasix for suspected CHF exacerbation, broad spectrum antibiotics for potential post obstructive pneumonia, and prednisone for potential COPD exacerbation (no PFTs to review). Respiratory failure seems most likely due to acute on chronic systolic/diastolic CHF as he improved rapidly with lasix. He is now on 4L HFNC.    To do  - wean O2 as tolerated. O2 testing prior to DC  - wean antibiotics if still no growth on culture  - may need to increase lasix PO on discharge  - follow up Palliative discussions- would be good for outpatient palliative visits at minimum for anxiety control     Jennifer Singleton MD  05/18/2022  2:19 PM

## 2022-05-18 NOTE — HPI
"(From H&P) "Mr. 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  He is followed by Dr. Hathaway in Oncology, last seeing him in clinic on 4/29.22.  At that time he noted, "On 8/30/2019 he underwent bronchoscopy that showed 'a partially obstructing (about 80% obstructed) mass was found in the middle portion in the left mainstem bronchus. The mass was large and bloody, circumferential, endobronchial, friable and necrotic. 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  Completed 4 cycles and then 2/10/2021 scans showed progression.  2/24/2021 he was subsequent started on gemcitabine with Dr. Cruz." Other medical issues include SCHF, macrocytic anemia, bronchiectasis, NSTEMI with non-obstructive CAD on [facility] (follows with Dr. Roman), MAT, and PAD.  His last TTE was on 2/23/22, and showed an estimated ejection fraction of 35-40%, normal RV systolic function (estimated PA systolic pressure is greater than 22 mmHg), and grade I left ventricular diastolic dysfunction. He now comes to the ED after developing acute SOB today.  His wife is at the bedside and assists with the history.  Apparently he awoke this morning for chemo and immediately became very anxious and had, "a panic attack, like he always does on chemo days."  His wife settled him down and he improved enough to be driven to  Onc.  On walking from the parking lot to the Onc clinic he was severely dyspneic.  He was found in clinic to have an O2 sat of 85%, so he was referred to the ED.  He denies fever or chills, but does report some cough and leg swelling.  He denies N/V/D. In the ED his VS's showed BP 90/59-->130/79 (BP Location: Left arm, Patient Position: Lying)   Pulse 111-->66   Temp Tm 97.7F, Tc 97.6 °F (36.4 °C) (Axillary)   Resp 18 " "  Ht 5' 10" (1.778 m)   Wt 95.4 kg (210 lb 5.1 oz)   SpO2 87%-->100%  BMI 30.18 kg/m².  Labs showed WBC 13.6, PLT clumped, D-dimer 0.84, Na 135, Cr 0.9, gluc 146, TB 1.3, AT 42, LA 8.7-->4, BNP 1051, Trop 0.016, COVID negative and Flu negative.  Initial ABG showed pH 7.26, PCO2 54, PAO2 286 with follow up VBG showing pH 7.38 and PCO2 of 42.  EKG showed NSR 73, Normal sinus rhythm, incomplete right bundle branch block, and nonspecific ST and T wave abnormality. CTA chest and CT abd/pelvis showed no evidence of pulmonary embolism.  There was a moderate pleural effusion on the right and mild pleural effusion on the left.  There is associated bilateral atelectasis and mild ground-glass infiltrate or edema.  There was airspace disease in left upper lobe and superior left lower lobe with slight nodular components.  This could represent neoplasm.  There was scarring, pneumonitis and or post treatment changes not excluded.  There was mild perinephric stranding right greater than left with minimal wall thickening of the renal pelvis on the right.  There was nondistention of the descending and sigmoid colon.  Minimal wall thickening is not excluded. In the ED he was treated with ASA 325mg 1504, LR 1L 1618, Vanco 2g 1735.  Due to respiratory distress, he underwent RSI and was placed on the vent - 8.0 ETT secured 24 cm @ lips. Pt was placed on PRVC: 20/450/+5/80% per ER settings. OGT was placed and confirmed in the stomach by KUB.  Shortly after arrival to the ICU room, he self-extubated and ripped out his OGT. He was placed on BiPAP, which he refused, then placed on Venti mask."     Palliative medicine is consulted for advance care planning, given pt's underlying metastatic cancer and current ICU stay. Met with pt at bedside; for details of discussion, see advance care planning section of plan.   "

## 2022-05-18 NOTE — ASSESSMENT & PLAN NOTE
-- 3/3 heart cath with nonobstructive CAD  -- LUIS FELIPE with severe disease   -- Continue ASA/Statin/Plavix    yes

## 2022-05-18 NOTE — EICU
eICU Physician Virtual/Remote Brief Evaluation Note      Message from RN  Patient self extubated new.  Recently came up from ED  Chart reviewed, patient observed, discussed with RN  Resting comfortably on non-rebreather mask  Awake, alert, responding appropriately.  Oriented to person place and situation but intermittently trying to remove mask  /81, P 84, RR 21, O2 sat 100  Titrate FiO2 to O2 sat 91-93  BiPAP ordered on standby  Bilateral soft restraints ordered for patient safety  ABG in 45 minutes      KAYLA Jimenez MD  [facility] Attending  [phone]    This report has been created through the use of [employer] dictation software. Typographical and content errors may occur with this process. While efforts are made to detect and correct such errors, in some cases errors will persist. For this reason, wording in this document should be considered in the proper context and not strictly verbatim

## 2022-05-18 NOTE — ASSESSMENT & PLAN NOTE
"Consult Oncology, Dr. 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  He is followed by Dr. Hathaway in Oncology, last seeing him in clinic on 4/29.22.  At that time he noted, "On 8/30/2019 he underwent bronchoscopy that showed 'a partially obstructing (about 80% obstructed) mass was found in the middle portion in the left mainstem bronchus. The mass was large and bloody, circumferential, endobronchial, friable and necrotic. 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  Completed 4 cycles and then 2/10/2021 scans showed progression.  2/24/2021 he was subsequent started on gemcitabine with Dr. Cruz."    "

## 2022-05-19 LAB
ANION GAP SERPL CALC-SCNC: 11 MMOL/L (ref 8–16)
BACTERIA SPEC AEROBE CULT: NORMAL
BASOPHILS # BLD AUTO: 0.02 K/UL (ref 0–0.2)
BASOPHILS NFR BLD: 0.1 % (ref 0–1.9)
BUN SERPL-MCNC: 16 MG/DL (ref 8–23)
CALCIUM SERPL-MCNC: 8.8 MG/DL (ref 8.7–10.5)
CHLORIDE SERPL-SCNC: 103 MMOL/L (ref 95–110)
CO2 SERPL-SCNC: 25 MMOL/L (ref 23–29)
CREAT SERPL-MCNC: 1 MG/DL (ref 0.5–1.4)
DIFFERENTIAL METHOD: ABNORMAL
EOSINOPHIL # BLD AUTO: 0 K/UL (ref 0–0.5)
EOSINOPHIL NFR BLD: 0 % (ref 0–8)
ERYTHROCYTE [DISTWIDTH] IN BLOOD BY AUTOMATED COUNT: 17.6 % (ref 11.5–14.5)
EST. GFR  (AFRICAN AMERICAN): >60 ML/MIN/1.73 M^2
EST. GFR  (NON AFRICAN AMERICAN): >60 ML/MIN/1.73 M^2
GLUCOSE SERPL-MCNC: 127 MG/DL (ref 70–110)
GRAM STN SPEC: NORMAL
HCT VFR BLD AUTO: 34.8 % (ref 40–54)
HGB BLD-MCNC: 11.7 G/DL (ref 14–18)
IMM GRANULOCYTES # BLD AUTO: 0.14 K/UL (ref 0–0.04)
IMM GRANULOCYTES NFR BLD AUTO: 0.6 % (ref 0–0.5)
LYMPHOCYTES # BLD AUTO: 1.9 K/UL (ref 1–4.8)
LYMPHOCYTES NFR BLD: 7.4 % (ref 18–48)
MAGNESIUM SERPL-MCNC: 1.8 MG/DL (ref 1.6–2.6)
MCH RBC QN AUTO: 32.1 PG (ref 27–31)
MCHC RBC AUTO-ENTMCNC: 33.6 G/DL (ref 32–36)
MCV RBC AUTO: 96 FL (ref 82–98)
MONOCYTES # BLD AUTO: 2.4 K/UL (ref 0.3–1)
MONOCYTES NFR BLD: 9.7 % (ref 4–15)
NEUTROPHILS # BLD AUTO: 20.6 K/UL (ref 1.8–7.7)
NEUTROPHILS NFR BLD: 82.2 % (ref 38–73)
NRBC BLD-RTO: 0 /100 WBC
PLATELET # BLD AUTO: 245 K/UL (ref 150–450)
PMV BLD AUTO: 10.3 FL (ref 9.2–12.9)
POTASSIUM SERPL-SCNC: 3.2 MMOL/L (ref 3.5–5.1)
RBC # BLD AUTO: 3.64 M/UL (ref 4.6–6.2)
SODIUM SERPL-SCNC: 139 MMOL/L (ref 136–145)
VANCOMYCIN SERPL-MCNC: 14.1 UG/ML
VANCOMYCIN TROUGH SERPL-MCNC: 24.9 UG/ML (ref 10–22)
WBC # BLD AUTO: 25.05 K/UL (ref 3.9–12.7)

## 2022-05-19 PROCEDURE — 25000003 PHARM REV CODE 250: Performed by: NURSE PRACTITIONER

## 2022-05-19 PROCEDURE — 25000003 PHARM REV CODE 250: Performed by: INTERNAL MEDICINE

## 2022-05-19 PROCEDURE — 94640 AIRWAY INHALATION TREATMENT: CPT

## 2022-05-19 PROCEDURE — 27000221 HC OXYGEN, UP TO 24 HOURS

## 2022-05-19 PROCEDURE — 80048 BASIC METABOLIC PNL TOTAL CA: CPT | Performed by: HOSPITALIST

## 2022-05-19 PROCEDURE — 25000003 PHARM REV CODE 250: Performed by: HOSPITALIST

## 2022-05-19 PROCEDURE — 11000001 HC ACUTE MED/SURG PRIVATE ROOM

## 2022-05-19 PROCEDURE — 25000242 PHARM REV CODE 250 ALT 637 W/ HCPCS: Performed by: HOSPITALIST

## 2022-05-19 PROCEDURE — 25000242 PHARM REV CODE 250 ALT 637 W/ HCPCS: Performed by: INTERNAL MEDICINE

## 2022-05-19 PROCEDURE — 63600175 PHARM REV CODE 636 W HCPCS: Performed by: INTERNAL MEDICINE

## 2022-05-19 PROCEDURE — 80202 ASSAY OF VANCOMYCIN: CPT | Mod: 91 | Performed by: HOSPITALIST

## 2022-05-19 PROCEDURE — 83735 ASSAY OF MAGNESIUM: CPT | Performed by: HOSPITALIST

## 2022-05-19 PROCEDURE — 80202 ASSAY OF VANCOMYCIN: CPT | Performed by: HOSPITALIST

## 2022-05-19 PROCEDURE — 63600175 PHARM REV CODE 636 W HCPCS: Performed by: HOSPITALIST

## 2022-05-19 PROCEDURE — 99291 CRITICAL CARE FIRST HOUR: CPT | Mod: ,,, | Performed by: NURSE PRACTITIONER

## 2022-05-19 PROCEDURE — 85025 COMPLETE CBC W/AUTO DIFF WBC: CPT | Performed by: HOSPITALIST

## 2022-05-19 PROCEDURE — 94761 N-INVAS EAR/PLS OXIMETRY MLT: CPT

## 2022-05-19 PROCEDURE — 99291 PR CRITICAL CARE, E/M 30-74 MINUTES: ICD-10-PCS | Mod: ,,, | Performed by: NURSE PRACTITIONER

## 2022-05-19 RX ORDER — POTASSIUM CHLORIDE 7.45 MG/ML
10 INJECTION INTRAVENOUS
Status: COMPLETED | OUTPATIENT
Start: 2022-05-19 | End: 2022-05-19

## 2022-05-19 RX ORDER — MAGNESIUM SULFATE 1 G/100ML
1 INJECTION INTRAVENOUS ONCE
Status: COMPLETED | OUTPATIENT
Start: 2022-05-19 | End: 2022-05-19

## 2022-05-19 RX ORDER — CETIRIZINE HYDROCHLORIDE 10 MG/1
10 TABLET ORAL DAILY
Status: DISCONTINUED | OUTPATIENT
Start: 2022-05-19 | End: 2022-05-22 | Stop reason: HOSPADM

## 2022-05-19 RX ORDER — LOSARTAN POTASSIUM 25 MG/1
100 TABLET ORAL DAILY
Status: DISCONTINUED | OUTPATIENT
Start: 2022-05-19 | End: 2022-05-22 | Stop reason: HOSPADM

## 2022-05-19 RX ORDER — FLUTICASONE PROPIONATE 50 MCG
2 SPRAY, SUSPENSION (ML) NASAL DAILY
Status: DISCONTINUED | OUTPATIENT
Start: 2022-05-19 | End: 2022-05-22 | Stop reason: HOSPADM

## 2022-05-19 RX ORDER — SODIUM,POTASSIUM PHOSPHATES 280-250MG
2 POWDER IN PACKET (EA) ORAL ONCE
Status: COMPLETED | OUTPATIENT
Start: 2022-05-19 | End: 2022-05-19

## 2022-05-19 RX ORDER — CARVEDILOL 6.25 MG/1
6.25 TABLET ORAL 2 TIMES DAILY
Status: DISCONTINUED | OUTPATIENT
Start: 2022-05-19 | End: 2022-05-22 | Stop reason: HOSPADM

## 2022-05-19 RX ADMIN — POTASSIUM CHLORIDE 10 MEQ: 7.46 INJECTION, SOLUTION INTRAVENOUS at 05:05

## 2022-05-19 RX ADMIN — CARVEDILOL 6.25 MG: 6.25 TABLET, FILM COATED ORAL at 01:05

## 2022-05-19 RX ADMIN — MAGNESIUM SULFATE 1 G: 1 INJECTION INTRAVENOUS at 05:05

## 2022-05-19 RX ADMIN — LOSARTAN POTASSIUM 100 MG: 25 TABLET, FILM COATED ORAL at 01:05

## 2022-05-19 RX ADMIN — IPRATROPIUM BROMIDE AND ALBUTEROL SULFATE 3 ML: 2.5; .5 SOLUTION RESPIRATORY (INHALATION) at 08:05

## 2022-05-19 RX ADMIN — AZITHROMYCIN MONOHYDRATE 500 MG: 500 INJECTION, POWDER, LYOPHILIZED, FOR SOLUTION INTRAVENOUS at 10:05

## 2022-05-19 RX ADMIN — SODIUM CHLORIDE: 0.9 INJECTION, SOLUTION INTRAVENOUS at 10:05

## 2022-05-19 RX ADMIN — IPRATROPIUM BROMIDE AND ALBUTEROL SULFATE 3 ML: 2.5; .5 SOLUTION RESPIRATORY (INHALATION) at 12:05

## 2022-05-19 RX ADMIN — ENOXAPARIN SODIUM 40 MG: 100 INJECTION SUBCUTANEOUS at 05:05

## 2022-05-19 RX ADMIN — VANCOMYCIN HYDROCHLORIDE 1500 MG: 1.5 INJECTION, POWDER, LYOPHILIZED, FOR SOLUTION INTRAVENOUS at 08:05

## 2022-05-19 RX ADMIN — PIPERACILLIN AND TAZOBACTAM 4.5 G: 4; .5 INJECTION, POWDER, FOR SOLUTION INTRAVENOUS at 03:05

## 2022-05-19 RX ADMIN — FUROSEMIDE 40 MG: 10 INJECTION, SOLUTION INTRAMUSCULAR; INTRAVENOUS at 06:05

## 2022-05-19 RX ADMIN — Medication 2 PACKET: at 09:05

## 2022-05-19 RX ADMIN — CETIRIZINE HYDROCHLORIDE 10 MG: 10 TABLET, FILM COATED ORAL at 01:05

## 2022-05-19 RX ADMIN — PIPERACILLIN AND TAZOBACTAM 4.5 G: 4; .5 INJECTION, POWDER, FOR SOLUTION INTRAVENOUS at 11:05

## 2022-05-19 RX ADMIN — FLUTICASONE PROPIONATE 100 MCG: 50 SPRAY, METERED NASAL at 01:05

## 2022-05-19 RX ADMIN — POTASSIUM CHLORIDE 10 MEQ: 7.46 INJECTION, SOLUTION INTRAVENOUS at 06:05

## 2022-05-19 RX ADMIN — FUROSEMIDE 40 MG: 10 INJECTION, SOLUTION INTRAMUSCULAR; INTRAVENOUS at 05:05

## 2022-05-19 RX ADMIN — ATORVASTATIN CALCIUM 80 MG: 40 TABLET, FILM COATED ORAL at 09:05

## 2022-05-19 RX ADMIN — ASPIRIN 81 MG CHEWABLE TABLET 81 MG: 81 TABLET CHEWABLE at 09:05

## 2022-05-19 RX ADMIN — IPRATROPIUM BROMIDE AND ALBUTEROL SULFATE 3 ML: 2.5; .5 SOLUTION RESPIRATORY (INHALATION) at 02:05

## 2022-05-19 RX ADMIN — AMLODIPINE BESYLATE 10 MG: 5 TABLET ORAL at 09:05

## 2022-05-19 RX ADMIN — PIPERACILLIN AND TAZOBACTAM 4.5 G: 4; .5 INJECTION, POWDER, FOR SOLUTION INTRAVENOUS at 06:05

## 2022-05-19 RX ADMIN — SODIUM CHLORIDE: 0.9 INJECTION, SOLUTION INTRAVENOUS at 05:05

## 2022-05-19 RX ADMIN — CARVEDILOL 6.25 MG: 6.25 TABLET, FILM COATED ORAL at 08:05

## 2022-05-19 RX ADMIN — CLOPIDOGREL 75 MG: 75 TABLET, FILM COATED ORAL at 09:05

## 2022-05-19 RX ADMIN — PREDNISONE 40 MG: 20 TABLET ORAL at 09:05

## 2022-05-19 NOTE — ASSESSMENT & PLAN NOTE
SCLC with obstructing mass in L mainstem bronchus and bone metastases. He is receiving palliative chemotherapy and radiation.   - Palliative care consult

## 2022-05-19 NOTE — CONSULTS
"[facility] [place]ology/Oncology  Consult Note    Patient Name: Kashif Iverson  MRN: [id number]  Admission Date: 5/17/2022  Hospital Length of Stay: 2 days  Code Status: Full Code   Attending Provider: Jennifer Singleton MD  Consulting Provider: Sameera Amador MD  Primary Care Physician: dEuardo Ruiz MD  Principal Problem:Acute respiratory failure with hypoxia and hypercapnia    Inpatient consult to Hematology/Oncology - [facility]  Consult performed by: Sameera Amador MD  Consult ordered by: KHUSHBU Woodard MD        Subjective:     HPI:  61 year old male with metastatic NSCLC on gemcitabine is admitted for acute respiratory failure requiring intubation. Pt self extubated in ICU . CTA with worsening bilateral effusion Was extubated, but required reintubation. Pt reports he felt like he was " suffocating" morning prior to admission and very anxious. Pt also uncertain if he taking his prescribed medication . Consulted for NSCLC.      Oncology Treatment Plan:   OP NSCLC GEMCITABINE Q3W    Medications:  Continuous Infusions:  Scheduled Meds:   albuterol-ipratropium  3 mL Nebulization Q6H    amLODIPine  10 mg Oral Daily    aspirin  81 mg Oral Daily    atorvastatin  80 mg Oral Daily    azithromycin  500 mg Intravenous Q24H    clopidogreL  75 mg Oral Daily    enoxaparin  40 mg Subcutaneous Daily    furosemide (LASIX) injection  40 mg Intravenous Q12H    piperacillin-tazobactam (ZOSYN) IVPB  4.5 g Intravenous Q8H    [START ON 5/19/2022] predniSONE  40 mg Oral Daily    vancomycin (VANCOCIN) IVPB  1,750 mg Intravenous Q12H     PRN Meds:sodium chloride 0.9%, acetaminophen, albuterol-ipratropium, aluminum-magnesium hydroxide-simethicone, influenza, naloxone, ondansetron, prochlorperazine, senna-docusate 8.6-50 mg, simethicone, sodium chloride 0.9%, Pharmacy to dose Vancomycin consult **AND** vancomycin - pharmacy to dose     Review of patient's allergies indicates:  No Known Allergies     Past Medical " History:   Diagnosis Date    Combined systolic and diastolic heart failure     Hypertension     Lung cancer     NSCLC    Lung mass     left lung     Past Surgical History:   Procedure Laterality Date    BRONCHOSCOPY N/A 2019    Procedure: Bronchoscopy;  Surgeon: Miguel Diagnostic Provider;  Location: [address];  Service: Anesthesiology;  Laterality: N/A;    CORONARY ANGIOGRAPHY N/A 3/4/2022    Procedure: ANGIOGRAM, CORONARY ARTERY;  Surgeon: Robson Green MD;  Location: [facility] CATH LAB;  Service: Cardiology;  Laterality: N/A;     Family History       Problem Relation (Age of Onset)    Cancer Father, Sister    Diabetes Mother    Heart defect Mother    No Known Problems Son          Tobacco Use    Smoking status: Former Smoker     Packs/day: 1.00     Years: 25.00     Pack years: 25.00     Types: Cigarettes     Quit date:      Years since quittin.3    Smokeless tobacco: Never Used   Substance and Sexual Activity    Alcohol use: Yes     Comment: ocassionally    Drug use: Never    Sexual activity: Yes     Partners: Female       Review of Systems   Constitutional:  Negative for activity change and fatigue.   HENT:  Negative for congestion.    Respiratory:  Positive for chest tightness and shortness of breath. Negative for cough.         (Improved)    Cardiovascular:  Negative for chest pain.   Gastrointestinal:  Negative for abdominal pain.   Genitourinary:  Negative for difficulty urinating.   Musculoskeletal:  Negative for arthralgias.   Skin:  Negative for wound.   Neurological:  Negative for seizures.   Hematological:  Does not bruise/bleed easily.   Psychiatric/Behavioral:  Negative for confusion. The patient is nervous/anxious.    Objective:     Vital Signs (Most Recent):  Temp: 98 °F (36.7 °C) (22 1600)  Pulse: 69 (22 1600)  Resp: (!) 23 (22 1600)  BP: 113/74 (22 1600)  SpO2: 95 % (22 1600)   Vital Signs (24h Range):  Temp:  [97.6 °F (36.4 °C)-98.3 °F (36.8  °C)] 98 °F (36.7 °C)  Pulse:  [56-95] 69  Resp:  [9-42] 23  SpO2:  [82 %-100 %] 95 %  BP: ()/(54-99) 113/74     Weight: 90.2 kg (198 lb 13.7 oz)  Body mass index is 28.53 kg/m².  Body surface area is 2.11 meters squared.      Intake/Output Summary (Last 24 hours) at 5/18/2022 1647  Last data filed at 5/18/2022 1200  Gross per 24 hour   Intake 2712.07 ml   Output 5845 ml   Net -3132.93 ml       Physical Exam  Constitutional:       Appearance: He is ill-appearing.   HENT:      Head: Normocephalic.      Comments: Mild temporal wasting   Eyes:      General: No scleral icterus.  Neck:      Vascular: No carotid bruit.   Cardiovascular:      Rate and Rhythm: Normal rate.   Pulmonary:      Effort: Pulmonary effort is normal.      Breath sounds: Examination of the right-lower field reveals decreased breath sounds. Decreased breath sounds present.      Comments: on NC oxygen   Abdominal:      General: Bowel sounds are normal.   Musculoskeletal:      Right lower leg: Edema present.      Left lower leg: Edema present.   Skin:     General: Skin is warm.   Neurological:      General: No focal deficit present.      Mental Status: He is alert and oriented to person, place, and time.   Psychiatric:         Mood and Affect: nervous/anxious.         Behavior: Behavior normal.       Significant Labs:   I have reviewed and interpreted all pertinent imaging results/findings within the past 24 hours     Diagnostic Results:    Impression:   CT c/a/p w/contrast 5/17/22  1. No evidence of pulmonary embolism.  Limited visualization.  See above comments.  2. Moderate pleural effusion on the right and mild pleural effusion on the left.  There is associated bilateral atelectasis and mild ground-glass infiltrate or edema.  Airspace disease in left upper lobe and superior left lower lobe with slight nodular components.  This could represent neoplasm..  Scarring, pneumonitis and or post treatment changes not excluded.  See above comments.   Follow-up recommended.  3. Mild perinephric stranding right greater than left with minimal wall thickening of the renal pelvis on the right.  Mild inflammatory or infectious process is not excluded.  4. Nondistention of the descending and sigmoid colon.  Minimal wall thickening is not excluded.    Assessment/Plan:     * Acute respiratory failure with hypoxia and hypercapnia  CTA reveals worsening bilateral effusions   differential includes malignant vs cardiogenic   Admitted with suspected acute on chronic combined systolic and diastolic CHF exacerbation    Pt briefly intubated then self extubated  Improved with aggressive diuresis   Pt uncertain of his medication use at home        Lung cancer, main bronchus, left  Pt followed by Dr. Hathaway for Metastatic NSCLC  Pt heavily pretreated and currently on gemcitabine  Followed by Palliative care to discuss GOC  Pt does meet Hospice criteria but does not align with his current GOC  He will follow-up with Dr. Hathaway as outpatient         Thank you for your consult.     Sameera Amador MD  Hematology/Oncology  [facility] [place] [facility] [facility]

## 2022-05-19 NOTE — ASSESSMENT & PLAN NOTE
Most recent ECHO:     · The left ventricle is normal in size with concentric hypertrophy and moderately decreased systolic function.  · The estimated ejection fraction is 35-40%.  · Grade I left ventricular diastolic dysfunction.  · Mild right ventricular enlargement with normal right ventricular systolic function.  · Mild left atrial enlargement.  · Mild right atrial enlargement.  · Mild tricuspid regurgitation.    Bilateral pleural effusions likely in setting of HF and questionable medication compliance.      -- Continue Lasix IV BID   -- Strict I&O   -- Telemetry   -- Resume home antihypertensives as clinically indicated -- amlodipine resumed   -- Holding beta blocker in setting of bradycardia overnight. Will resume as able.

## 2022-05-19 NOTE — ASSESSMENT & PLAN NOTE
Patient admitted with Hypercapnic and Hypoxic which is Acute.  he is not on home oxygen. Signs/symptoms of respiratory failure include- tachypnea, increased work of breathing and respiratory distress present on admission.   - Labs and images were reviewed. Patient Has recent ABG, which has been reviewed..   - Supplemental oxygen was provided and noted- High Flow Nasal Cannula  4L HFNC   - Respiratory failure is due to- CHF, COPD and Pneumonia   - treat CHF with IV lasix. Repeat TTE improved from prior  - unknown if he has COPD- no prior PFTs. Given smoking history, will continue prednisone x5 days  - concern for post obstructive PNA. No growth on ET aspirate. Continue vanc, zosyn, azithro for now

## 2022-05-19 NOTE — ASSESSMENT & PLAN NOTE
Pt followed by Dr. Hathaway for Metastatic NSCLC  Pt heavily pretreated and currently on gemcitabine  Followed by Palliative care to discuss GOC  Pt does meet Hospice criteria but does not align with his current GOC  He will follow-up with Dr. Hathaway as outpatient

## 2022-05-19 NOTE — ASSESSMENT & PLAN NOTE
"Per chart review: "On 8/30/2019 he underwent bronchoscopy that showed 'a partially obstructing (about 80% obstructed) mass was found in the middle portion in the left mainstem bronchus. The mass was large and bloody, circumferential, endobronchial, friable and necrotic. 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  Completed 4 cycles and then 2/10/2021 scans showed progression.  2/24/2021 he was subsequent started on gemcitabine with Dr. Cruz."    -- Oncology aware patient admitted. Appreciate assistance. Will follow outpatient with Dr. Hathaway   -- Palliative consulted. Appreciate assistance.   "

## 2022-05-19 NOTE — PROGRESS NOTES
"[facility] Intensive [facility] Medicine  Progress Note    Patient Name: Kashif Iverson  MRN: [id number]  Patient Class: IP- Inpatient   Admission Date: 5/17/2022  Length of Stay: 2 days  Attending Physician: Jennifer Singleton MD  Primary Care Provider: Eduardo Ruiz MD        Subjective:     Principal Problem:Acute respiratory failure with hypoxia and hypercapnia        HPI:  Mr. 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  He is followed by Dr. Hathaway in Oncology, last seeing him in clinic on 4/29.22.  At that time he noted, "On 8/30/2019 he underwent bronchoscopy that showed 'a partially obstructing (about 80% obstructed) mass was found in the middle portion in the left mainstem bronchus. The mass was large and bloody, circumferential, endobronchial, friable and necrotic. 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  Completed 4 cycles and then 2/10/2021 scans showed progression.  2/24/2021 he was subsequent started on gemcitabine with Dr. Cruz."    Other medical issues include SCHF, macrocytic anemia, bronchiectasis, NSTEMI with non-obstructive CAD on [facility] (follows with Dr. Roman), MAT, and PAD.  His last TTE was on 2/23/22, and showed an estimated ejection fraction of 35-40%, normal RV systolic function (estimated PA systolic pressure is greater than 22 mmHg), and grade I left ventricular diastolic dysfunction.    He now comes to the ED after developing acute SOB today.  His wife is at the bedside and assists with the history.  Apparently he awoke this morning for chemo and immediately became very anxious and had, "a panic attack, like he always does on chemo days."  His wife settled him down and he improved enough to be driven to [facility].  On walking from the parking lot to the Onc " "clinic he was severely dyspneic.  He was found in clinic to have an O2 sat of 85%, so he was referred to the ED.  He denies fever or chills, but does report some cough and leg swelling.  He denies N/V/D.    In the ED his VS's showed BP 90/59-->130/79 (BP Location: Left arm, Patient Position: Lying)   Pulse 111-->66   Temp Tm 97.7F, Tc 97.6 °F (36.4 °C) (Axillary)   Resp 18   Ht 5' 10" (1.778 m)   Wt 95.4 kg (210 lb 5.1 oz)   SpO2 87%-->100%  BMI 30.18 kg/m².  Labs showed WBC 13.6, PLT clumped, D-dimer 0.84, Na 135, Cr 0.9, gluc 146, TB 1.3, AT 42, LA 8.7-->4, BNP 1051, Trop 0.016, COVID negative and Flu negative.  Initial ABG showed pH 7.26, PCO2 54, PAO2 286 with follow up VBG showing pH 7.38 and PCO2 of 42.  EKG showed NSR 73, Normal sinus rhythm, incomplete right bundle branch block, and nonspecific ST and T wave abnormality.    CTA chest and CT abd/pelvis showed no evidence of pulmonary embolism.  There was a moderate pleural effusion on the right and mild pleural effusion on the left.  There is associated bilateral atelectasis and mild ground-glass infiltrate or edema.  There was airspace disease in left upper lobe and superior left lower lobe with slight nodular components.  This could represent neoplasm.  There was scarring, pneumonitis and or post treatment changes not excluded.  There was mild perinephric stranding right greater than left with minimal wall thickening of the renal pelvis on the right.  There was nondistention of the descending and sigmoid colon.  Minimal wall thickening is not excluded.    In the ED he was treated with ASA 325mg 1504, LR 1L 1618, Vanco 2g 1735.  Due to respiratory distress, he underwent RSI and was placed on the vent - 8.0 ETT secured 24 cm @ lips. Pt was placed on PRVC: 20/450/+5/80% per ER settings. OGT was placed and confirmed in the stomach by KUB.  Shortly after arrival to the ICU room, he self-extubated and ripped out his OGT.  He was placed on BiPAP, which he " refused, then placed on Venti mask.         Overview/Hospital Course:  Mr Kashif Iverson is a 61 y.o. man admitted with acute hypoxic respiratory failure. He has known SCLC on palliative chemotherapy and systolic/diastolic CHF. He was briefly intubated on 5/18 but self extubated a few hours later. Started IV lasix for CHF exacerbation, broad spectrum antibiotics for potential post obstructive pneumonia, and prednisone for potential COPD exacerbation (no PFTs to review). Respiratory failure seems most likely due to acute on chronic systolic/diastolic CHF as he improved rapidly with lasix. He is now on 3L HFNC. He has had episodes of anxiety that worsen his respiratory status.       Interval History: Complains of sinus congestion R>L. No other complaints. He does not remember episode of anxiety/shortness of breath last night.     Review of Systems   Constitutional:  Negative for chills and fever.   HENT:  Positive for congestion.    Respiratory:  Negative for chest tightness, shortness of breath and wheezing.    Cardiovascular:  Negative for chest pain, palpitations and leg swelling.   Gastrointestinal:  Negative for abdominal distention, nausea and vomiting.   Genitourinary:  Negative for difficulty urinating.   Musculoskeletal:  Negative for arthralgias and myalgias.   Neurological:  Negative for weakness and numbness.   Psychiatric/Behavioral:  Negative for confusion.    Objective:     Vital Signs (Most Recent):  Temp: 97.8 °F (36.6 °C) (05/19/22 0800)  Pulse: 66 (05/19/22 1000)  Resp: 19 (05/19/22 1000)  BP: (!) 160/88 (05/19/22 1000)  SpO2: 97 % (05/19/22 1000)   Vital Signs (24h Range):  Temp:  [97.8 °F (36.6 °C)-98.5 °F (36.9 °C)] 97.8 °F (36.6 °C)  Pulse:  [] 66  Resp:  [16-46] 19  SpO2:  [84 %-100 %] 97 %  BP: ()/() 160/88     Weight: 90.2 kg (198 lb 13.7 oz)  Body mass index is 28.53 kg/m².    Intake/Output Summary (Last 24 hours) at 5/19/2022 1148  Last data filed at 5/19/2022 1000  Gross per  24 hour   Intake 1580.34 ml   Output 4675 ml   Net -3094.66 ml        Physical Exam  Vitals and nursing note reviewed.   Constitutional:       General: He is not in acute distress.     Appearance: He is not toxic-appearing.   HENT:      Head: Normocephalic and atraumatic.      Nose: Nose normal.      Mouth/Throat:      Mouth: Mucous membranes are moist.   Cardiovascular:      Rate and Rhythm: Normal rate and regular rhythm.      Pulses: Normal pulses.      Heart sounds: Normal heart sounds. No murmur heard.    No gallop.   Pulmonary:      Effort: Pulmonary effort is normal. No respiratory distress.      Breath sounds: No wheezing or rales.      Comments: 4L HFNC.  Abdominal:      General: Bowel sounds are normal. There is no distension.      Palpations: Abdomen is soft.      Tenderness: There is no abdominal tenderness. There is no guarding.   Musculoskeletal:      Right lower leg: No edema.      Left lower leg: No edema.   Skin:     General: Skin is warm and dry.   Neurological:      Mental Status: He is alert and oriented to person, place, and time.       Significant Labs: All pertinent labs within the past 24 hours have been reviewed.    Significant Imaging: I have reviewed all pertinent imaging results/findings within the past 24 hours.      Assessment/Plan:      * Acute respiratory failure with hypoxia and hypercapnia  Patient admitted with Hypercapnic and Hypoxic which is Acute.  he is not on home oxygen. Signs/symptoms of respiratory failure include- tachypnea, increased work of breathing and respiratory distress present on admission.   - Labs and images were reviewed. Patient Has recent ABG, which has been reviewed..   - Supplemental oxygen was provided and noted- High Flow Nasal Cannula  4L HFNC   - Respiratory failure is due to- CHF, COPD and Pneumonia   - treat CHF with IV lasix. Repeat TTE improved from prior  - unknown if he has COPD- no prior PFTs. Given smoking history, will continue prednisone x5  "days  - concern for post obstructive PNA. No growth on ET aspirate. Continue vanc, zosyn, azithro for now       Hypomagnesemia  Replace and monitor        Goals of care, counseling/discussion  Advance Care Planning   Palliative care consulted.       Sepsis  This patient does have evidence of infective focus  My overall impression is sepsis. Vital signs were reviewed and noted in progress note.  Antibiotics given-   Antibiotics (From admission, onward)            Start     Stop Route Frequency Ordered    05/17/22 2245  azithromycin 500 mg in dextrose 5 % 250 mL IVPB (ready to mix system)         05/20 2244 IV Every 24 hours (non-standard times) 05/17/22 2132 05/17/22 2238  vancomycin - pharmacy to dose  (vancomycin IVPB)        "And" Linked Group Details    -- IV pharmacy to manage frequency 05/17/22 2138 05/17/22 1830  piperacillin-tazobactam 4.5 g in dextrose 5 % 100 mL IVPB (ready to mix system)         -- IV Every 8 hours (non-standard times) 05/17/22 1728        Cultures were taken-   Microbiology Results (last 7 days)     Procedure Component Value Units Date/Time    Culture, Respiratory with Gram Stain [[id number]] Collected: 05/17/22 2030    Order Status: Completed Specimen: Sputum, Expectorated Updated: 05/19/22 0854     Respiratory Culture Normal respiratory maksim     Gram Stain (Respiratory) Few Gram positive cocci in clusters     Gram Stain (Respiratory) <10 epithelial cells per low power field.     Gram Stain (Respiratory) No WBC's    Blood Culture #1 **CANNOT BE ORDERED STAT** [[id number]] Collected: 05/17/22 1716    Order Status: Completed Specimen: Blood from Peripheral, Upper Arm, Right Updated: 05/18/22 1903     Blood Culture, Routine No Growth to date      No Growth to date    Blood Culture #2 **CANNOT BE ORDERED STAT** [[id number]] Collected: 05/17/22 1723    Order Status: Completed Specimen: Blood from Peripheral, Forearm, Right Updated: 05/18/22 1903     Blood Culture, Routine No Growth to " date      No Growth to date        Latest lactate reviewed, they are-   Latest Reference Range & Units 02/22/22 14:54 02/22/22 19:10 05/17/22 17:16   Lactate, Humberto 0.5 - 2.2 mmol/L 8.7 (HH) [1] 2.6 (H) [2] 4.0 (HH) [3]      Organ dysfunction indicated by Acute respiratory failure  Source- Lungs    Source control Achieved by- IV antibiotics      Coronary artery disease involving native coronary artery of native heart without angina pectoris  Continue asa, plavix, statin         PAD (peripheral artery disease)  Continue ASA, Plavix, statin as above      Acute on chronic combined systolic and diastolic heart failure  Patient admitted with shortness of breath, edema consistent with acute on chronic systolic and diastolic CHF. Cause of exacerbation is unclear     His last TTE was on 2/23/22, and showed an estimated ejection fraction of 35-40%, normal RV systolic function (estimated PA systolic pressure is greater than 22 mmHg), and grade I left ventricular diastolic dysfunction.    BNP  Recent Labs   Lab 05/17/22  1428   BNP 1,051*     Start on IV lasix diuresis. Monitor ins and outs  TTE repeated- improved function  Resume home losartan  Change home atenolol to coreg for CHF  Nutrition consulted for low salt cardiac diet education  Cardiology follow up on discharge    Hypokalemia  Replace and monitor        Essential hypertension  BP normalized  Continue home amlodipine, losartan  Change home atenolol to coreg for CHF    Secondary malignant neoplasm of bone  Noted       Lung cancer, main bronchus, left  SCLC with obstructing mass in L mainstem bronchus and bone metastases. He is receiving palliative chemotherapy and radiation.   - Palliative care consult      Pneumonia  Concern for post obstructive PNA  Continue vanc, zosyn, azithromycin for now  ET aspirate culture no growth      VTE Risk Mitigation (From admission, onward)         Ordered     enoxaparin injection 40 mg  Daily         05/17/22 1926     Place NASIR hose   Until discontinued         05/17/22 1926     IP VTE HIGH RISK PATIENT  Once         05/17/22 1926     Place sequential compression device  Until discontinued         05/17/22 1926                Discharge Planning   AZ:      Code Status: Full Code   Is the patient medically ready for discharge?:     Reason for patient still in hospital (select all that apply): Patient trending condition  Discharge Plan A: Home            Critical care time spent on the evaluation and treatment of severe organ dysfunction, review of pertinent labs and imaging studies, discussions with consulting providers and discussions with patient/family: 20 minutes.      Jennifer Singleton MD  Department of Hospital Medicine   [facility] - [place] - Intensive Care

## 2022-05-19 NOTE — CARE UPDATE
05/18/22 1951   Patient Assessment/Suction   Level of Consciousness (AVPU) alert   Respiratory Effort Mild;Labored   All Lung Fields Breath Sounds diminished   Rhythm/Pattern, Respiratory tachypneic;shortness of breath   Cough Frequency no cough   PRE-TX-O2   O2 Device (Oxygen Therapy) High Flow nasal Cannula   Flow (L/min) 5   Oxygen Concentration (%) 40   SpO2 97 %   Pulse Oximetry Type Continuous   $ Pulse Oximetry - Multiple Charge Pulse Oximetry - Multiple   Pulse (!) 137   Resp (!) 29   Aerosol Therapy   $ Aerosol Therapy Charges Aerosol Treatment   Daily Review of Necessity (SVN) completed   Respiratory Treatment Status (SVN) given   Treatment Route (SVN) oxygen;mask   Patient Position (SVN) HOB elevated;semi-Quintero's   Post Treatment Assessment (SVN) breath sounds unchanged   Signs of Intolerance (SVN) none   Ready to Wean/Extubation Screen   FIO2<=50 (chart decimal) 0.4     Patient ambulated to the bathroom. Patient having SOB.

## 2022-05-19 NOTE — HOSPITAL COURSE
Mr. Iverson is a 61 year old male with multiple medical problems admitted to the ICU for acute hypercapnic and hypoxic respiratory failure. In short, he presented to [facility] ED in respiratory distress and hypoxia, and subsequently intubated. He shortly after self extubated and was transitioned to NC. Imaging revealed worsening of chronic appearing pleural effusion. IV diuresis with good output. Broad spectrum abx as CT imaging shows ? OPAL post obstructive PNA. Palliative care consulted to assist with goals of care conversations as patient has had multiple intubations within recent months. He follows with Dr. Hathaway for management of his NSCLC. Heme/Onc was also consulted.

## 2022-05-19 NOTE — ASSESSMENT & PLAN NOTE
-- Palliative consulted. See thorough documentation from consult note 05/18  -- Patient would benefit from hospice services for symptom management as he suffers severe dyspnea and respiratory distress with associated anxiety  -- Awaiting return call from wife to discuss

## 2022-05-19 NOTE — ASSESSMENT & PLAN NOTE
Concern for post obstructive PNA  Continue vanc, zosyn, azithromycin for now  ET aspirate culture no growth

## 2022-05-19 NOTE — ASSESSMENT & PLAN NOTE
-- Home antihypertensives initially held due to labile blood pressure. Will resume as clinically indicated.   -- Back on home amlodipine   -- Holding home BB in setting of bradycardia overnight

## 2022-05-19 NOTE — PROGRESS NOTES
"[facility]  Critical Care Medicine  Progress Note    Patient Name: Kashif Iverson  MRN: [id number]  Admission Date: 5/17/2022  Hospital Length of Stay: 2 days  Code Status: Full Code  Attending Provider: Jennifer Singleton MD  Primary Care Provider: Eduardo Ruiz MD   Principal Problem: Acute respiratory failure with hypoxia and hypercapnia    Subjective:     HPI:  Mr. Iverson is a 61 year old male with significant PMH notable for HTN, HF (most recent EF 35-40%, G1DD), CAD, previous NSTEMI, anemia, bronchiectasis, 25 year pack year cigarette history, and NSCLC (followed by Dr. Hathaway, diagnosed in 2019) who presented to  ED with shortness of breath and respiratory distress. Per chart review, his wife at bedside states that he woke up yesterday morning very anxious and with worsening dyspnea. Per chart review, his wife states he had "a panic attack like he always does on chemo days."     In the emergency department he was normotensive, tachycardic, and afebrile. COVID and Flu negative. Initial labs work up demonstrates WBC 13.6,  D-dimer 0.84, Na 135, Cr 0.9, gluc 146, TB 1.3, AT 42, LA 8.7-->4, BNP 1051, and Trop 0.016. NSR on EKG. CTA without PE. CT with bilateral pleural effusions (present on previous imaging) R > L, atelectasis, pulm nodules. There was mild perinephric stranding right greater than left with minimal wall thickening of the renal pelvis on the right, and nondistention of the descending and sigmoid colon.      In the emergency department he was given ASA, IVF, started on broad spectrum abx and an infectious work up was sent. Given his respiratory distress he was intubated and placed on the ventilator. After arrival to the ICU, patient self extubated and was placed on BiPAP, and shortly after transitioned to nasal cannula.     In speaking with patient this morning, he reports that he felt as if he were "suffocating" yesterday with worsening anxiety than usual. He reports that this has occurred " several times over the last few months. He is unsure of his medication use (I.e. if he was taking his prescribed Lasix 40mg).       Hospital/ICU Course:  Mr. Iverson is a 61 year old male with multiple medical problems admitted to the ICU for acute hypercapnic and hypoxic respiratory failure. In short, he presented to  ED in respiratory distress and hypoxia, and subsequently intubated. He shortly after self extubated and was transitioned to NC. Imaging revealed worsening of chronic appearing pleural effusion. IV diuresis with good output. Broad spectrum abx as CT imaging shows ? OPAL post obstructive PNA. Palliative care consulted to assist with goals of care conversations as patient has had multiple intubations within recent months. He follows with Dr. Hathaway for management of his NSCLC. Heme/Onc was also consulted.       Interval History: Anxiety overnight with associated tachypnea and tachycardia. Given 1mg morphine and Zyprexa x1 with resolution of symptoms. Weaning oxygen this morning and tolerating well.     Objective:     Vital Signs (Most Recent):  Temp: 97.8 °F (36.6 °C) (05/19/22 0800)  Pulse: 66 (05/19/22 1000)  Resp: 19 (05/19/22 1000)  BP: (!) 160/88 (05/19/22 1000)  SpO2: 97 % (05/19/22 1000)   Vital Signs (24h Range):  Temp:  [97.8 °F (36.6 °C)-98.5 °F (36.9 °C)] 97.8 °F (36.6 °C)  Pulse:  [] 66  Resp:  [16-46] 19  SpO2:  [84 %-100 %] 97 %  BP: ()/() 160/88     Weight: 90.2 kg (198 lb 13.7 oz)  Body mass index is 28.53 kg/m².      Intake/Output Summary (Last 24 hours) at 5/19/2022 1241  Last data filed at 5/19/2022 1000  Gross per 24 hour   Intake 1537.3 ml   Output 4675 ml   Net -3137.7 ml       Physical Exam  Vitals and nursing note reviewed.   Constitutional:       General: He is not in acute distress.     Appearance: He is ill-appearing.      Comments: Temporal wasting   HENT:      Head: Normocephalic and atraumatic.      Mouth/Throat:      Mouth: Mucous membranes are dry.       Pharynx: Oropharynx is clear. No oropharyngeal exudate.   Eyes:      General: No scleral icterus.     Pupils: Pupils are equal, round, and reactive to light.   Cardiovascular:      Rate and Rhythm: Normal rate and regular rhythm.      Pulses: Normal pulses.      Heart sounds: Normal heart sounds.   Pulmonary:      Breath sounds: No wheezing or rales.   Chest:      Comments: Right chest port - accessed.   Genitourinary:     Comments: Lemon patent and draining at bedside    Musculoskeletal:      Comments: Edema improving    Skin:     General: Skin is dry.      Capillary Refill: Capillary refill takes less than 2 seconds.      Coloration: Skin is not jaundiced.      Findings: Bruising present.   Neurological:      General: No focal deficit present.      Mental Status: He is alert and oriented to person, place, and time.      GCS: GCS eye subscore is 4. GCS verbal subscore is 5. GCS motor subscore is 6.       Vents:  Vent Mode: PRVC (05/17/22 1827)  Set Rate: 20 BPM (05/17/22 1827)  Vt Set: 450 mL (05/17/22 1827)  PEEP/CPAP: 5 cmH20 (05/17/22 1827)  Oxygen Concentration (%): 40 (05/19/22 0805)  Peak Airway Pressure: 15.4 cmH2O (05/17/22 1827)  Total Ve: 9.4 mL (05/17/22 1827)  F/VT Ratio<105 (RSBI): (!) 65.71 (05/17/22 1827)    Lines/Drains/Airways       Central Venous Catheter Line  Duration             Port A Cath Single Lumen right subclavian -- days              Peripheral Intravenous Line  Duration                  Peripheral IV - Single Lumen 05/17/22 1441 20 G;1 in Left Antecubital 1 day         Peripheral IV - Single Lumen 05/17/22 1716 18 G;1 3/4 in Right Upper Arm 1 day         Peripheral IV - Single Lumen 05/17/22 1723 18 G;1 3/4 in Right Forearm 1 day                    Significant Labs:    CBC/Anemia Profile:  Recent Labs   Lab 05/17/22  1428 05/18/22  0316 05/19/22  0416   WBC 13.60* 11.03 25.05*   HGB 14.1 13.9* 11.7*   HCT 41.7 40.8 34.8*   PLT SEE COMMENT 231 245   MCV 95 94 96   RDW 17.5* 17.1* 17.6*         Chemistries:  Recent Labs   Lab 05/17/22  1428 05/18/22  0316 05/19/22  0416   * 137 139   K 4.1 3.5 3.2*    102 103   CO2 19* 23 25   BUN 9 9 16   CREATININE 0.9 0.8 1.0   CALCIUM 9.2 9.1 8.8   ALBUMIN 3.9 3.6  --    PROT 7.9 7.1  --    BILITOT 1.3* 1.5*  --    ALKPHOS 101 95  --    ALT 22 21  --    AST 42* 31  --    MG  --  1.5* 1.8   PHOS  --  3.6  --        All pertinent labs within the past 24 hours have been reviewed.    Significant Imaging:  I have reviewed all pertinent imaging results/findings within the past 24 hours.      ABG  Recent Labs   Lab 05/18/22 2010   PH 7.485*   PO2 67*   PCO2 29.0*   HCO3 21.9*   BE 0     Assessment/Plan:     Pulmonary  * Acute respiratory failure with hypoxia and hypercapnia  Patient with Hypoxic Respiratory failure which is Acute on chronic.  he is not on home oxygen.     Signs/symptoms of respiratory failure include- tachypnea, increased work of breathing, respiratory distress and use of accessory muscles. Contributing diagnoses includes - CHF and Pneumonia Labs and images were reviewed. Patient Has recent ABG, which has been reviewed.     61 year old male with multiple medical problems including advanced NSCLC s/p multiple rounds of treatment who presented to  ED with shortness of breath and respiratory distress who was intubated and shortly after self extubated. CTA without PE. CT with bilateral pleural effusions and possible post obstructive PNA     -- Self extubated, now on NC. Wean for SpO2 > 90%   -- IS/ Flutter valve   -- Continue IV lasix BID   -- Electrolyte replacement for mg > 2, phos > 3, k > 4  -- Continue broad spectrum abx and follow up infectious work up   -- Continue prednisone  -- Needs pulm / onc follow up at outpatient   -- No PFTs on file / No home therapies   -- Albuterol PRN     Pneumonia  -- See respiratory failure     Cardiac/Vascular  Coronary artery disease involving native coronary artery of native heart without angina  "pectoris  -- 3/3 heart cath with nonobstructive CAD  -- LUIS FELIPE with severe disease   -- Continue ASA/Statin/Plavix     PAD (peripheral artery disease)  -- 03/11/22 LUIS FELIPE with severe disease   -- Continue ASA/Statin/plavix    Acute on chronic combined systolic and diastolic heart failure  Most recent ECHO:     · The left ventricle is normal in size with concentric hypertrophy and moderately decreased systolic function.  · The estimated ejection fraction is 35-40%.  · Grade I left ventricular diastolic dysfunction.  · Mild right ventricular enlargement with normal right ventricular systolic function.  · Mild left atrial enlargement.  · Mild right atrial enlargement.  · Mild tricuspid regurgitation.    Bilateral pleural effusions likely in setting of HF and questionable medication compliance.      -- Continue Lasix IV BID   -- Strict I&O   -- Telemetry   -- Resume home antihypertensives as clinically indicated -- amlodipine resumed   -- Holding beta blocker in setting of bradycardia overnight. Will resume as able.     Essential hypertension  -- Home antihypertensives initially held due to labile blood pressure. Will resume as clinically indicated.   -- Back on home amlodipine   -- Holding home BB in setting of bradycardia overnight     ID  Sepsis  This patient does have evidence of infective focus  My overall impression is sepsis. Vital signs were reviewed and noted in progress note.  Antibiotics given-   Antibiotics (From admission, onward)            Start     Stop Route Frequency Ordered    05/17/22 2245  azithromycin 500 mg in dextrose 5 % 250 mL IVPB (ready to mix system)         05/20 2244 IV Every 24 hours (non-standard times) 05/17/22 2132 05/17/22 2238  vancomycin - pharmacy to dose  (vancomycin IVPB)        "And" Linked Group Details    -- IV pharmacy to manage frequency 05/17/22 2138 05/17/22 1830  piperacillin-tazobactam 4.5 g in dextrose 5 % 100 mL IVPB (ready to mix system)         -- IV Every 8 hours " "(non-standard times) 05/17/22 1728        Cultures were taken-   Microbiology Results (last 7 days)     Procedure Component Value Units Date/Time    Culture, Respiratory with Gram Stain [[id number]] Collected: 05/17/22 2030    Order Status: Completed Specimen: Sputum, Expectorated Updated: 05/19/22 0854     Respiratory Culture Normal respiratory maksim     Gram Stain (Respiratory) Few Gram positive cocci in clusters     Gram Stain (Respiratory) <10 epithelial cells per low power field.     Gram Stain (Respiratory) No WBC's    Blood Culture #1 **CANNOT BE ORDERED STAT** [[id number]] Collected: 05/17/22 1716    Order Status: Completed Specimen: Blood from Peripheral, Upper Arm, Right Updated: 05/18/22 1903     Blood Culture, Routine No Growth to date      No Growth to date    Blood Culture #2 **CANNOT BE ORDERED STAT** [[id number]] Collected: 05/17/22 1723    Order Status: Completed Specimen: Blood from Peripheral, Forearm, Right Updated: 05/18/22 1903     Blood Culture, Routine No Growth to date      No Growth to date        Latest lactate reviewed, they are-  Recent Labs   Lab 05/17/22 1716   LACTATE 4.0*       Organ dysfunction indicated by Acute respiratory failure  Source- lung     Source control Achieved by- broad spectrum abx       Oncology  Lung cancer, main bronchus, left  Per chart review: "On 8/30/2019 he underwent bronchoscopy that showed 'a partially obstructing (about 80% obstructed) mass was found in the middle portion in the left mainstem bronchus. The mass was large and bloody, circumferential, endobronchial, friable and necrotic. 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  Completed 4 cycles and then 2/10/2021 scans showed progression.  2/24/2021 he was subsequent started on gemcitabine with Dr. Cruz."    -- Oncology aware patient admitted. Appreciate assistance. Will follow " outpatient with Dr. Hathaway   -- Palliative consulted. Appreciate assistance.     Palliative Care  Goals of care, counseling/discussion  -- Palliative consulted. See thorough documentation from consult note 05/18  -- Patient would benefit from hospice services for symptom management as he suffers severe dyspnea and respiratory distress with associated anxiety  -- Awaiting return call from wife to discuss       Critical Care Daily Checklist:    A: Awake: RASS Goal/Actual Goal:    Actual: Duran Agitation Sedation Scale (RASS): Drowsy   B: Spontaneous Breathing Trial Performed?     C: SAT & SBT Coordinated?  N/A                      D: Delirium: CAM-ICU     E: Early Mobility Performed? Yes   F: Feeding Goal:    Status:     Current Diet Order   Procedures    Diet Cardiac [facility]     Order Specific Question:   Indicate patient location for additional diet options:     [facility]      AS: Analgesia/Sedation PRN    T: Thromboembolic Prophylaxis Lovenox    H: HOB > 300 Yes   U: Stress Ulcer Prophylaxis (if needed) N/A   G: Glucose Control Following.    B: Bowel Function     I: Indwelling Catheter (Lines & Lemon) Necessity PIV   D: De-escalation of Antimicrobials/Pharmacotherapies Follow up culture data. D#2 today of abx     Plan for the day/ETD Step down to hospital medicine     Code Status:  Family/Goals of Care: Full Code  Awaiting return call from wife      Critical Care Time: 40 minutes    Plan of care discussed with Dr. Adair and primary team. Pulmonary critical care will sign off at this time. Patient to follow up in outpatient pulm clinic.     Critical secondary to Patient has a condition that poses threat to life and bodily function: acute on chronic combined heart failure and respiratory failure       Critical care was time spent personally by me on the following activities: development of treatment plan with patient or surrogate and bedside caregivers, discussions with consultants,  evaluation of patient's response to treatment, examination of patient, ordering and performing treatments and interventions, ordering and review of laboratory studies, ordering and review of radiographic studies, pulse oximetry, re-evaluation of patient's condition. This critical care time did not overlap with that of any other provider or involve time for any procedures.     Deuce Garcia NP  Critical Care Medicine  [facility] - Intensive Care

## 2022-05-19 NOTE — PLAN OF CARE
Patient remains in ICU. Transfer order d/c'd by MD Woodard. At start of shift, pt was AAOx3 with some confusion to situation but had improved from previous PM shift with patient. Pt stated needed to urinate and told this RN he had been sitting on the side of the bed since his griffin had been removed to do so. Pt was assisted to side of bed. When getting back into bed, pt became extremely SOB, and tachycardic to 140s. Sats in 80s. RT was already at bedside to give scheduled breathing treatment. MD Woodard notified and came to bedside. CXR, ABG, 1 mg morphine ordered. Pt HR and dyspnea and sats improved but became very confused and suspicious of staff. Would not allow staff to get close to him and appeared afraid despite calmly reorienting him and explaining plan of care to him. MD notified and one time dose zyprexa ordered. Pt much more calm and VSS for remainder of shift, however pt did need reorientation to situation and place each time pt awoke and needed to urinate. Bed alarm in use. Much more redirectable. Abx administered as ordered. Pt with 2L UO this shift. K 3.2, replaced with 20 mEq KCL IVPB. Mg 1.8, replaced with 1 g Mg Sulfate. No falls, injury, or skin breakdown. Pt repositioned independently in bed. Remains on 5 L HFNC. Afebrile. Now NSR/SB on cardiac monitor. Plan of care reviewed.

## 2022-05-19 NOTE — PLAN OF CARE
Remains in ICU with transfer orders placed. Uneventful day. On 4LNC. Voiding per urinal. Free of falls, injury, or breakdown. Plan of care reviewed.

## 2022-05-19 NOTE — ACP (ADVANCE CARE PLANNING)
Advance Care Planning     Date: 05/19/2022    Today a meeting took place: bedside    Patient Participation: Patient is able to participate     Attendees (Name and  Relationship to patient): Patient, Wife (Natty), and brother    Staff attendees (Name and  Role): Deuce Garcia NP    ACP Conversation (General): Understanding of current condition , and terminal illness with two recent expereinces with life support. Met with patient, wife, and brother at bedside. Reviewed current treatment and plan. Discussed that unfortunately Mr. Iverson has a terminal illness, and has been on life support (mechanical ventilation) twice recently. We discussed that while he wishes to continue treatment with Dr. Hathaway, he also wants to focus on managing his symptoms (shortness of breath, anxiety, restlessness) outside of the hospital. Discussed general hospice information and patient and family are both open to discussions with hospice.      Code Status: Full Code    ACP Documents: None    Goals of care: The patient and family endorses that what is most important right now is to focus on spending time at home, avoiding the hospital, remaining as independent as possible, symptom/pain control and quality of life, even if it means sacrificing a little time    Accordingly, we have decided that the best plan to meet the patient's goals includes enrolling in hospice care to assist with symptom management and provide additional support to caregivers (wife, and family)       Recommendations/  Follow-up tasks: Other (specify below) Social work consult placed for assistance with hospice referral       Length of ACP   conversation in minutes: 18 minutes    JUSTIN Albert  Pulmonary Critical Care Medicine   05/19/2022

## 2022-05-19 NOTE — ASSESSMENT & PLAN NOTE
"This patient does have evidence of infective focus  My overall impression is sepsis. Vital signs were reviewed and noted in progress note.  Antibiotics given-   Antibiotics (From admission, onward)            Start     Stop Route Frequency Ordered    05/17/22 2245  azithromycin 500 mg in dextrose 5 % 250 mL IVPB (ready to mix system)         05/20 2244 IV Every 24 hours (non-standard times) 05/17/22 2132 05/17/22 2238  vancomycin - pharmacy to dose  (vancomycin IVPB)        "And" Linked Group Details    -- IV pharmacy to manage frequency 05/17/22 2138 05/17/22 1830  piperacillin-tazobactam 4.5 g in dextrose 5 % 100 mL IVPB (ready to mix system)         -- IV Every 8 hours (non-standard times) 05/17/22 1728        Cultures were taken-   Microbiology Results (last 7 days)     Procedure Component Value Units Date/Time    Culture, Respiratory with Gram Stain [[id number]] Collected: 05/17/22 2030    Order Status: Completed Specimen: Sputum, Expectorated Updated: 05/19/22 0854     Respiratory Culture Normal respiratory maksim     Gram Stain (Respiratory) Few Gram positive cocci in clusters     Gram Stain (Respiratory) <10 epithelial cells per low power field.     Gram Stain (Respiratory) No WBC's    Blood Culture #1 **CANNOT BE ORDERED STAT** [[id number]] Collected: 05/17/22 1716    Order Status: Completed Specimen: Blood from Peripheral, Upper Arm, Right Updated: 05/18/22 1903     Blood Culture, Routine No Growth to date      No Growth to date    Blood Culture #2 **CANNOT BE ORDERED STAT** [[id number]] Collected: 05/17/22 1723    Order Status: Completed Specimen: Blood from Peripheral, Forearm, Right Updated: 05/18/22 1903     Blood Culture, Routine No Growth to date      No Growth to date        Latest lactate reviewed, they are-  Recent Labs   Lab 05/17/22 1716   LACTATE 4.0*       Organ dysfunction indicated by Acute respiratory failure  Source- lung     Source control Achieved by- broad spectrum abx     "

## 2022-05-19 NOTE — PROGRESS NOTES
Pharmacokinetic Assessment Follow Up: IV Vancomycin    Vancomycin serum concentration assessment(s):    The trough level was drawn correctly and can be used to guide therapy at this time. The measurement is within the desired definitive target range of 10 to 20 mcg/mL.    Vancomycin Regimen Plan:    Change regimen to Vancomycin 1500 mg IV every 12 hours with next serum trough concentration measured at 1830 prior to 3rd dose on 05/20/22    Drug levels (last 3 results):  Recent Labs   Lab Result Units 05/19/22 0416 05/19/22  1801   Vancomycin, Random ug/mL  --  14.1   Vancomycin-Trough ug/mL 24.9*  --        Pharmacy will continue to follow and monitor vancomycin.    Please contact pharmacy at extension [phone] for questions regarding this assessment.    Thank you for the consult,   Nelida Bermudez       Patient brief summary:  Kashif Iverson is a 61 y.o. male initiated on antimicrobial therapy with IV Vancomycin for treatment of  pneumonia    The patient's current regimen is 1500 mg q12h    Drug Allergies:   Review of patient's allergies indicates:  No Known Allergies    Actual Body Weight:   90.2kg    Renal Function:   Estimated Creatinine Clearance: 87.7 mL/min (based on SCr of 1 mg/dL).,     Dialysis Method (if applicable):  N/A    CBC (last 72 hours):  Recent Labs   Lab Result Units 05/17/22  1428 05/18/22 0316 05/19/22  0416   WBC K/uL 13.60* 11.03 25.05*   Hemoglobin g/dL 14.1 13.9* 11.7*   Hematocrit % 41.7 40.8 34.8*   Platelets K/uL SEE COMMENT 231 245   Gran % % 84.1* 93.1* 82.2*   Lymph % % 7.4* 5.5* 7.4*   Mono % % 7.6 0.9* 9.7   Eosinophil % % 0.1 0.0 0.0   Basophil % % 0.5 0.2 0.1   Differential Method  Automated Automated Automated       Metabolic Panel (last 72 hours):  Recent Labs   Lab Result Units 05/17/22  1428 05/17/22  2040 05/18/22 0316 05/19/22  0416   Sodium mmol/L 135*  --  137 139   Potassium mmol/L 4.1  --  3.5 3.2*   Chloride mmol/L 103  --  102 103   CO2 mmol/L 19*  --  23 25   Glucose mg/dL  146*  --  180* 127*   Glucose, UA   --  Negative  --   --    BUN mg/dL 9  --  9 16   Creatinine mg/dL 0.9  --  0.8 1.0   Albumin g/dL 3.9  --  3.6  --    Total Bilirubin mg/dL 1.3*  --  1.5*  --    Alkaline Phosphatase U/L 101  --  95  --    AST U/L 42*  --  31  --    ALT U/L 22  --  21  --    Magnesium mg/dL  --   --  1.5* 1.8   Phosphorus mg/dL  --   --  3.6  --        Vancomycin Administrations:  vancomycin given in the last 96 hours                     vancomycin (VANCOCIN) 1,750 mg in dextrose 5 % 500 mL IVPB (mg) 1,750 mg New Bag 05/18/22 1815     1,750 mg New Bag  0433    vancomycin (VANCOCIN) 2,000 mg in dextrose 5 % 500 mL IVPB (mg) 2,000 mg New Bag 05/17/22 1735                    Microbiologic Results:  Microbiology Results (last 7 days)       Procedure Component Value Units Date/Time    Culture, Respiratory with Gram Stain [[id number]] Collected: 05/17/22 2030    Order Status: Completed Specimen: Sputum, Expectorated Updated: 05/19/22 0854     Respiratory Culture Normal respiratory maksim     Gram Stain (Respiratory) Few Gram positive cocci in clusters     Gram Stain (Respiratory) <10 epithelial cells per low power field.     Gram Stain (Respiratory) No WBC's    Blood Culture #1 **CANNOT BE ORDERED STAT** [[id number]] Collected: 05/17/22 1716    Order Status: Completed Specimen: Blood from Peripheral, Upper Arm, Right Updated: 05/18/22 1903     Blood Culture, Routine No Growth to date      No Growth to date    Blood Culture #2 **CANNOT BE ORDERED STAT** [[id number]] Collected: 05/17/22 1723    Order Status: Completed Specimen: Blood from Peripheral, Forearm, Right Updated: 05/18/22 1903     Blood Culture, Routine No Growth to date      No Growth to date

## 2022-05-19 NOTE — PROGRESS NOTES
Pharmacokinetic Assessment Follow Up: IV Vancomycin    Vancomycin serum concentration assessment(s):    The trough level was drawn correctly and can be used to guide therapy at this time. The measurement is above the desired definitive target range of 10 to 20 mcg/mL.    Vancomycin Regimen Plan:    Discontinue the scheduled vancomycin regimen and re-dose when the random level is less than 20 mcg/mL, next level to be drawn at 1800 on 5/19/22.    Drug levels (last 3 results):  Recent Labs   Lab Result Units 05/19/22  0416   Vancomycin-Trough ug/mL 24.9*       Pharmacy will continue to follow and monitor vancomycin.    Please contact pharmacy at extension [phone] for questions regarding this assessment.    Thank you for the consult,   Marlon Cervantes       Patient brief summary:  Kashif Iverson is a 61 y.o. male initiated on antimicrobial therapy with IV Vancomycin for treatment of lower respiratory infection    The patient's current regimen is random pulse dosing    Drug Allergies:   Review of patient's allergies indicates:  No Known Allergies    Actual Body Weight:   90.2 kg    Renal Function:   Estimated Creatinine Clearance: 87.7 mL/min (based on SCr of 1 mg/dL).,     Dialysis Method (if applicable):  N/A    CBC (last 72 hours):  Recent Labs   Lab Result Units 05/17/22 1428 05/18/22 0316 05/19/22  0416   WBC K/uL 13.60* 11.03 25.05*   Hemoglobin g/dL 14.1 13.9* 11.7*   Hematocrit % 41.7 40.8 34.8*   Platelets K/uL SEE COMMENT 231 245   Gran % % 84.1* 93.1* 82.2*   Lymph % % 7.4* 5.5* 7.4*   Mono % % 7.6 0.9* 9.7   Eosinophil % % 0.1 0.0 0.0   Basophil % % 0.5 0.2 0.1   Differential Method  Automated Automated Automated       Metabolic Panel (last 72 hours):  Recent Labs   Lab Result Units 05/17/22 1428 05/17/22 2040 05/18/22 0316 05/19/22  0416   Sodium mmol/L 135*  --  137 139   Potassium mmol/L 4.1  --  3.5 3.2*   Chloride mmol/L 103  --  102 103   CO2 mmol/L 19*  --  23 25   Glucose mg/dL 146*  --  180*  127*   Glucose, UA   --  Negative  --   --    BUN mg/dL 9  --  9 16   Creatinine mg/dL 0.9  --  0.8 1.0   Albumin g/dL 3.9  --  3.6  --    Total Bilirubin mg/dL 1.3*  --  1.5*  --    Alkaline Phosphatase U/L 101  --  95  --    AST U/L 42*  --  31  --    ALT U/L 22  --  21  --    Magnesium mg/dL  --   --  1.5* 1.8   Phosphorus mg/dL  --   --  3.6  --        Vancomycin Administrations:  vancomycin given in the last 96 hours                     vancomycin (VANCOCIN) 1,750 mg in dextrose 5 % 500 mL IVPB (mg) 1,750 mg New Bag 05/18/22 1815     1,750 mg New Bag  0433    vancomycin (VANCOCIN) 2,000 mg in dextrose 5 % 500 mL IVPB (mg) 2,000 mg New Bag 05/17/22 1735                    Microbiologic Results:  Microbiology Results (last 7 days)       Procedure Component Value Units Date/Time    Blood Culture #1 **CANNOT BE ORDERED STAT** [[id number]] Collected: 05/17/22 1716    Order Status: Completed Specimen: Blood from Peripheral, Upper Arm, Right Updated: 05/18/22 1903     Blood Culture, Routine No Growth to date      No Growth to date    Blood Culture #2 **CANNOT BE ORDERED STAT** [[id number]] Collected: 05/17/22 1723    Order Status: Completed Specimen: Blood from Peripheral, Forearm, Right Updated: 05/18/22 1903     Blood Culture, Routine No Growth to date      No Growth to date    Culture, Respiratory with Gram Stain [[id number]] Collected: 05/17/22 2030    Order Status: Completed Specimen: Sputum, Expectorated Updated: 05/18/22 1158     Respiratory Culture Normal respiratory maksim     Gram Stain (Respiratory) Few Gram positive cocci in clusters     Gram Stain (Respiratory) <10 epithelial cells per low power field.     Gram Stain (Respiratory) No WBC's

## 2022-05-19 NOTE — ASSESSMENT & PLAN NOTE
-- 3/3 heart cath with nonobstructive CAD  -- LUIS FELIPE with severe disease   -- Continue ASA/Statin/Plavix

## 2022-05-19 NOTE — ASSESSMENT & PLAN NOTE
CTA reveals worsening bilateral effusions   differential includes malignant vs cardiogenic   Admitted with suspected acute on chronic combined systolic and diastolic CHF exacerbation    Pt briefly intubated then self extubated  Improved with aggressive diuresis   Pt uncertain of his medication use at home

## 2022-05-19 NOTE — SUBJECTIVE & OBJECTIVE
Interval History: Anxiety overnight with associated tachypnea and tachycardia. Given 1mg morphine and Zyprexa x1 with resolution of symptoms. Weaning oxygen this morning and tolerating well.     Objective:     Vital Signs (Most Recent):  Temp: 97.8 °F (36.6 °C) (05/19/22 0800)  Pulse: 66 (05/19/22 1000)  Resp: 19 (05/19/22 1000)  BP: (!) 160/88 (05/19/22 1000)  SpO2: 97 % (05/19/22 1000)   Vital Signs (24h Range):  Temp:  [97.8 °F (36.6 °C)-98.5 °F (36.9 °C)] 97.8 °F (36.6 °C)  Pulse:  [] 66  Resp:  [16-46] 19  SpO2:  [84 %-100 %] 97 %  BP: ()/() 160/88     Weight: 90.2 kg (198 lb 13.7 oz)  Body mass index is 28.53 kg/m².      Intake/Output Summary (Last 24 hours) at 5/19/2022 1241  Last data filed at 5/19/2022 1000  Gross per 24 hour   Intake 1537.3 ml   Output 4675 ml   Net -3137.7 ml       Physical Exam  Vitals and nursing note reviewed.   Constitutional:       General: He is not in acute distress.     Appearance: He is ill-appearing.      Comments: Temporal wasting   HENT:      Head: Normocephalic and atraumatic.      Mouth/Throat:      Mouth: Mucous membranes are dry.      Pharynx: Oropharynx is clear. No oropharyngeal exudate.   Eyes:      General: No scleral icterus.     Pupils: Pupils are equal, round, and reactive to light.   Cardiovascular:      Rate and Rhythm: Normal rate and regular rhythm.      Pulses: Normal pulses.      Heart sounds: Normal heart sounds.   Pulmonary:      Breath sounds: No wheezing or rales.   Chest:      Comments: Right chest port - accessed.   Genitourinary:     Comments: Lemon patent and draining at bedside    Musculoskeletal:      Comments: Edema improving    Skin:     General: Skin is dry.      Capillary Refill: Capillary refill takes less than 2 seconds.      Coloration: Skin is not jaundiced.      Findings: Bruising present.   Neurological:      General: No focal deficit present.      Mental Status: He is alert and oriented to person, place, and time.       GCS: GCS eye subscore is 4. GCS verbal subscore is 5. GCS motor subscore is 6.       Vents:  Vent Mode: PRVC (05/17/22 1827)  Set Rate: 20 BPM (05/17/22 1827)  Vt Set: 450 mL (05/17/22 1827)  PEEP/CPAP: 5 cmH20 (05/17/22 1827)  Oxygen Concentration (%): 40 (05/19/22 0805)  Peak Airway Pressure: 15.4 cmH2O (05/17/22 1827)  Total Ve: 9.4 mL (05/17/22 1827)  F/VT Ratio<105 (RSBI): (!) 65.71 (05/17/22 1827)    Lines/Drains/Airways       Central Venous Catheter Line  Duration             Port A Cath Single Lumen right subclavian -- days              Peripheral Intravenous Line  Duration                  Peripheral IV - Single Lumen 05/17/22 1441 20 G;1 in Left Antecubital 1 day         Peripheral IV - Single Lumen 05/17/22 1716 18 G;1 3/4 in Right Upper Arm 1 day         Peripheral IV - Single Lumen 05/17/22 1723 18 G;1 3/4 in Right Forearm 1 day                    Significant Labs:    CBC/Anemia Profile:  Recent Labs   Lab 05/17/22  1428 05/18/22  0316 05/19/22  0416   WBC 13.60* 11.03 25.05*   HGB 14.1 13.9* 11.7*   HCT 41.7 40.8 34.8*   PLT SEE COMMENT 231 245   MCV 95 94 96   RDW 17.5* 17.1* 17.6*        Chemistries:  Recent Labs   Lab 05/17/22  1428 05/18/22  0316 05/19/22  0416   * 137 139   K 4.1 3.5 3.2*    102 103   CO2 19* 23 25   BUN 9 9 16   CREATININE 0.9 0.8 1.0   CALCIUM 9.2 9.1 8.8   ALBUMIN 3.9 3.6  --    PROT 7.9 7.1  --    BILITOT 1.3* 1.5*  --    ALKPHOS 101 95  --    ALT 22 21  --    AST 42* 31  --    MG  --  1.5* 1.8   PHOS  --  3.6  --        All pertinent labs within the past 24 hours have been reviewed.    Significant Imaging:  I have reviewed all pertinent imaging results/findings within the past 24 hours.

## 2022-05-19 NOTE — NURSING
[facility]  Nurses Note -- 4 Eyes      5/19/2022       Skin assessed on: Q Shift      [x] No Pressure Injuries Present    [x]Prevention Measures Documented    [x] Yes LDA Previously documented     [] Yes New Pressure Injury Discovered   [] LDA Added      Attending RN:  Sherri Rose, RN     Second RN:

## 2022-05-19 NOTE — ASSESSMENT & PLAN NOTE
Patient admitted with shortness of breath, edema consistent with acute on chronic systolic and diastolic CHF. Cause of exacerbation is unclear     His last TTE was on 2/23/22, and showed an estimated ejection fraction of 35-40%, normal RV systolic function (estimated PA systolic pressure is greater than 22 mmHg), and grade I left ventricular diastolic dysfunction.    BNP  Recent Labs   Lab 05/17/22  1428   BNP 1,051*     Start on IV lasix diuresis. Monitor ins and outs  TTE repeated- improved function  Resume home losartan  Change home atenolol to coreg for CHF  Nutrition consulted for low salt cardiac diet education  Cardiology follow up on discharge

## 2022-05-19 NOTE — ASSESSMENT & PLAN NOTE
Patient with Hypoxic Respiratory failure which is Acute on chronic.  he is not on home oxygen.     Signs/symptoms of respiratory failure include- tachypnea, increased work of breathing, respiratory distress and use of accessory muscles. Contributing diagnoses includes - CHF and Pneumonia Labs and images were reviewed. Patient Has recent ABG, which has been reviewed.     61 year old male with multiple medical problems including advanced NSCLC s/p multiple rounds of treatment who presented to  ED with shortness of breath and respiratory distress who was intubated and shortly after self extubated. CTA without PE. CT with bilateral pleural effusions and possible post obstructive PNA     -- Self extubated, now on NC. Wean for SpO2 > 90%   -- IS/ Flutter valve   -- Continue IV lasix BID   -- Electrolyte replacement for mg > 2, phos > 3, k > 4  -- Continue broad spectrum abx and follow up infectious work up   -- Continue prednisone  -- Needs pulm / onc follow up at outpatient   -- No PFTs on file / No home therapies   -- Albuterol PRN

## 2022-05-19 NOTE — PROGRESS NOTES
RODOLFO met with patient's wife, Natty, at the bedside and provided her with a list of hospice providers obtained fro [facility].  RODOLFO also provided a [facility] Directory containing listings of hospice providers.  SW will f/u to obtained patient's preferred provider.

## 2022-05-19 NOTE — PLAN OF CARE
Attempted to call patient's wife, Natty, to discuss advance care planning; no answer. Will attempt again tomorrow.     DELPHINE Granados  Palliative Medicine Staff

## 2022-05-19 NOTE — SIGNIFICANT EVENT
"I was called to the bedside earlier tonight due to Mr. Iverson developing acute anxiety with severe respiratory distress after having his griffin removed and sitting up to urinate.    On arrival he was mildly confused, tachycardic to 140-150's and tachypneic.  I gave him a duoneb and 1mg of Morphine, after which he settled down to a pulse of 100 and relaxed.  He has remained slightly hypotensive and now his pulse is on the lower end.  I have cancelled his Tx out of MICU to watch for 24 hours more    BP (!) 92/54   Pulse (!) 55   Temp 98.3 °F (36.8 °C) (Oral)   Resp 18   Ht 5' 10" (1.778 m)   Wt 90.2 kg (198 lb 13.7 oz)   SpO2 (!) 90%   BMI 28.53 kg/m²        Current Facility-Administered Medications:     0.9%  NaCl infusion, , Intravenous, PRN, Jennifer Singleton MD    acetaminophen tablet 650 mg, 650 mg, Oral, Q4H PRN, KHUSHBU Woodard MD    albuterol-ipratropium 2.5 mg-0.5 mg/3 mL nebulizer solution 3 mL, 3 mL, Nebulization, Q4H PRN, KHUSHBU Woodard MD    albuterol-ipratropium 2.5 mg-0.5 mg/3 mL nebulizer solution 3 mL, 3 mL, Nebulization, Q6H, KHUSHBU Woodard MD, 3 mL at 05/19/22 0040    aluminum-magnesium hydroxide-simethicone 200-200-20 mg/5 mL suspension 30 mL, 30 mL, Oral, Q4H PRN, KHUSHBU Woodard MD    amLODIPine tablet 10 mg, 10 mg, Oral, Daily, Jennifer Singleton MD, 10 mg at 05/18/22 0836    aspirin chewable tablet 81 mg, 81 mg, Oral, Daily, KHUSHBU Woodard MD, 81 mg at 05/18/22 0836    atorvastatin tablet 80 mg, 80 mg, Oral, Daily, KHUSHBU Woodard MD, 80 mg at 05/18/22 0836    azithromycin 500 mg in dextrose 5 % 250 mL IVPB (ready to mix system), 500 mg, Intravenous, Q24H, KHUSHBU Woodard MD, Stopped at 05/19/22 0011    clopidogreL tablet 75 mg, 75 mg, Oral, Daily, KHUSHBU Woodard MD, 75 mg at 05/18/22 0836    enoxaparin injection 40 mg, 40 mg, Subcutaneous, Daily, KHUSHBU Woodard MD, 40 mg at 05/18/22 1800    furosemide injection 40 mg, 40 mg, Intravenous, Q12H, KHUSHBU Woodard MD, " 40 mg at 05/18/22 1816    influenza (QUADRIVALENT PF) vaccine 0.5 mL, 0.5 mL, Intramuscular, vaccine x 1 dose, Jennifer Singleton MD    morphine injection 1 mg, 1 mg, Intravenous, Q4H PRN, KHUSHBU Woodard MD    naloxone 0.4 mg/mL injection 0.02 mg, 0.02 mg, Intravenous, PRN, KHUSHBU Woodard MD    ondansetron injection 4 mg, 4 mg, Intravenous, Q8H PRN, KHUSHBU Woodard MD    piperacillin-tazobactam 4.5 g in dextrose 5 % 100 mL IVPB (ready to mix system), 4.5 g, Intravenous, Q8H, KHUSHBU Woodard MD, Last Rate: 25 mL/hr at 05/19/22 0316, 4.5 g at 05/19/22 0316    predniSONE tablet 40 mg, 40 mg, Oral, Daily, Jennifer Singleton MD    prochlorperazine injection Soln 5 mg, 5 mg, Intravenous, Q6H PRN, KHUSHBU Woodard MD    senna-docusate 8.6-50 mg per tablet 1 tablet, 1 tablet, Oral, BID PRN, KHUSHBU Woodard MD    simethicone chewable tablet 80 mg, 1 tablet, Oral, QID PRN, KHUSHBU Woodard MD    sodium chloride 0.9% flush 10 mL, 10 mL, Intravenous, Q12H PRN, KHUSHBU Woodard MD    vancomycin (VANCOCIN) 1,750 mg in dextrose 5 % 500 mL IVPB, 1,750 mg, Intravenous, Q12H, Jennifer Singleton MD, Stopped at 05/18/22 2015    Pharmacy to dose Vancomycin consult, , , Once **AND** vancomycin - pharmacy to dose, , Intravenous, pharmacy to manage frequency, KHUSHBU Woodard MD      Recent Results (from the past 12 hour(s))   ISTAT PROCEDURE    Collection Time: 05/18/22  8:10 PM   Result Value Ref Range    POC PH 7.485 (H) 7.35 - 7.45    POC PCO2 29.0 (LL) 35 - 45 mmHg    POC PO2 67 (L) 80 - 100 mmHg    POC HCO3 21.9 (L) 24 - 28 mmol/L    POC BE 0 -2 to 2 mmol/L    POC SATURATED O2 95 95 - 100 %    POC TCO2 23 23 - 27 mmol/L    Sample ARTERIAL     Site RR     Allens Test Pass     DelSys Nasal Can     Mode SPONT     Flow 5     FiO2 40     Sp02 92      CHEST XRAY  FINDINGS:  There is a right sided port terminating in the low SVC.   The lungs are hypoexpanded.  There are continued bilateral interstitial and airspace  opacities, similar to prior.  There are small pleural effusions.  There is no pneumothorax.   The cardiac silhouette is partially obscured.   The visualized osseous structures are intact.     Impression:   Bilateral interstitial and airspace opacities and small effusions, not significantly changed from prior.      Electronically signed by: Arjun Dejesus  Date:                                            05/18/2022  Time:                                           20:25

## 2022-05-19 NOTE — SUBJECTIVE & OBJECTIVE
Interval History: Complains of sinus congestion R>L. No other complaints. He does not remember episode of anxiety/shortness of breath last night.     Review of Systems   Constitutional:  Negative for chills and fever.   HENT:  Positive for congestion.    Respiratory:  Negative for chest tightness, shortness of breath and wheezing.    Cardiovascular:  Negative for chest pain, palpitations and leg swelling.   Gastrointestinal:  Negative for abdominal distention, nausea and vomiting.   Genitourinary:  Negative for difficulty urinating.   Musculoskeletal:  Negative for arthralgias and myalgias.   Neurological:  Negative for weakness and numbness.   Psychiatric/Behavioral:  Negative for confusion.    Objective:     Vital Signs (Most Recent):  Temp: 97.8 °F (36.6 °C) (05/19/22 0800)  Pulse: 66 (05/19/22 1000)  Resp: 19 (05/19/22 1000)  BP: (!) 160/88 (05/19/22 1000)  SpO2: 97 % (05/19/22 1000)   Vital Signs (24h Range):  Temp:  [97.8 °F (36.6 °C)-98.5 °F (36.9 °C)] 97.8 °F (36.6 °C)  Pulse:  [] 66  Resp:  [16-46] 19  SpO2:  [84 %-100 %] 97 %  BP: ()/() 160/88     Weight: 90.2 kg (198 lb 13.7 oz)  Body mass index is 28.53 kg/m².    Intake/Output Summary (Last 24 hours) at 5/19/2022 1148  Last data filed at 5/19/2022 1000  Gross per 24 hour   Intake 1580.34 ml   Output 4675 ml   Net -3094.66 ml        Physical Exam  Vitals and nursing note reviewed.   Constitutional:       General: He is not in acute distress.     Appearance: He is not toxic-appearing.   HENT:      Head: Normocephalic and atraumatic.      Nose: Nose normal.      Mouth/Throat:      Mouth: Mucous membranes are moist.   Cardiovascular:      Rate and Rhythm: Normal rate and regular rhythm.      Pulses: Normal pulses.      Heart sounds: Normal heart sounds. No murmur heard.    No gallop.   Pulmonary:      Effort: Pulmonary effort is normal. No respiratory distress.      Breath sounds: No wheezing or rales.      Comments: 4L HFNC.  Abdominal:       General: Bowel sounds are normal. There is no distension.      Palpations: Abdomen is soft.      Tenderness: There is no abdominal tenderness. There is no guarding.   Musculoskeletal:      Right lower leg: No edema.      Left lower leg: No edema.   Skin:     General: Skin is warm and dry.   Neurological:      Mental Status: He is alert and oriented to person, place, and time.       Significant Labs: All pertinent labs within the past 24 hours have been reviewed.    Significant Imaging: I have reviewed all pertinent imaging results/findings within the past 24 hours.

## 2022-05-19 NOTE — ASSESSMENT & PLAN NOTE
"This patient does have evidence of infective focus  My overall impression is sepsis. Vital signs were reviewed and noted in progress note.  Antibiotics given-   Antibiotics (From admission, onward)            Start     Stop Route Frequency Ordered    05/17/22 2245  azithromycin 500 mg in dextrose 5 % 250 mL IVPB (ready to mix system)         05/20 2244 IV Every 24 hours (non-standard times) 05/17/22 2132 05/17/22 2238  vancomycin - pharmacy to dose  (vancomycin IVPB)        "And" Linked Group Details    -- IV pharmacy to manage frequency 05/17/22 2138 05/17/22 1830  piperacillin-tazobactam 4.5 g in dextrose 5 % 100 mL IVPB (ready to mix system)         -- IV Every 8 hours (non-standard times) 05/17/22 1728        Cultures were taken-   Microbiology Results (last 7 days)     Procedure Component Value Units Date/Time    Culture, Respiratory with Gram Stain [[id number]] Collected: 05/17/22 2030    Order Status: Completed Specimen: Sputum, Expectorated Updated: 05/19/22 0854     Respiratory Culture Normal respiratory maksim     Gram Stain (Respiratory) Few Gram positive cocci in clusters     Gram Stain (Respiratory) <10 epithelial cells per low power field.     Gram Stain (Respiratory) No WBC's    Blood Culture #1 **CANNOT BE ORDERED STAT** [[id number]] Collected: 05/17/22 1716    Order Status: Completed Specimen: Blood from Peripheral, Upper Arm, Right Updated: 05/18/22 1903     Blood Culture, Routine No Growth to date      No Growth to date    Blood Culture #2 **CANNOT BE ORDERED STAT** [[id number]] Collected: 05/17/22 1723    Order Status: Completed Specimen: Blood from Peripheral, Forearm, Right Updated: 05/18/22 1903     Blood Culture, Routine No Growth to date      No Growth to date        Latest lactate reviewed, they are-   Latest Reference Range & Units 02/22/22 14:54 02/22/22 19:10 05/17/22 17:16   Lactate, Humberto 0.5 - 2.2 mmol/L 8.7 (HH) [1] 2.6 (H) [2] 4.0 (HH) [3]      Organ dysfunction indicated by Acute " respiratory failure  Source- Lungs    Source control Achieved by- IV antibiotics

## 2022-05-20 PROBLEM — A41.9 SEPSIS: Status: RESOLVED | Noted: 2022-05-17 | Resolved: 2022-05-20

## 2022-05-20 LAB
ANION GAP SERPL CALC-SCNC: 11 MMOL/L (ref 8–16)
BASOPHILS # BLD AUTO: 0.03 K/UL (ref 0–0.2)
BASOPHILS NFR BLD: 0.2 % (ref 0–1.9)
BUN SERPL-MCNC: 21 MG/DL (ref 8–23)
CALCIUM SERPL-MCNC: 8.8 MG/DL (ref 8.7–10.5)
CHLORIDE SERPL-SCNC: 105 MMOL/L (ref 95–110)
CO2 SERPL-SCNC: 29 MMOL/L (ref 23–29)
CREAT SERPL-MCNC: 1 MG/DL (ref 0.5–1.4)
DIFFERENTIAL METHOD: ABNORMAL
EOSINOPHIL # BLD AUTO: 0 K/UL (ref 0–0.5)
EOSINOPHIL NFR BLD: 0 % (ref 0–8)
ERYTHROCYTE [DISTWIDTH] IN BLOOD BY AUTOMATED COUNT: 17.8 % (ref 11.5–14.5)
EST. GFR  (AFRICAN AMERICAN): >60 ML/MIN/1.73 M^2
EST. GFR  (NON AFRICAN AMERICAN): >60 ML/MIN/1.73 M^2
GLUCOSE SERPL-MCNC: 94 MG/DL (ref 70–110)
HCT VFR BLD AUTO: 37.1 % (ref 40–54)
HGB BLD-MCNC: 12.1 G/DL (ref 14–18)
IMM GRANULOCYTES # BLD AUTO: 0.1 K/UL (ref 0–0.04)
IMM GRANULOCYTES NFR BLD AUTO: 0.5 % (ref 0–0.5)
LYMPHOCYTES # BLD AUTO: 2.2 K/UL (ref 1–4.8)
LYMPHOCYTES NFR BLD: 10.8 % (ref 18–48)
MAGNESIUM SERPL-MCNC: 1.9 MG/DL (ref 1.6–2.6)
MCH RBC QN AUTO: 31.3 PG (ref 27–31)
MCHC RBC AUTO-ENTMCNC: 32.6 G/DL (ref 32–36)
MCV RBC AUTO: 96 FL (ref 82–98)
MONOCYTES # BLD AUTO: 2.3 K/UL (ref 0.3–1)
MONOCYTES NFR BLD: 11.7 % (ref 4–15)
NEUTROPHILS # BLD AUTO: 15.3 K/UL (ref 1.8–7.7)
NEUTROPHILS NFR BLD: 76.8 % (ref 38–73)
NRBC BLD-RTO: 0 /100 WBC
PHOSPHATE SERPL-MCNC: 4.9 MG/DL (ref 2.7–4.5)
PLATELET # BLD AUTO: 303 K/UL (ref 150–450)
PMV BLD AUTO: 10.5 FL (ref 9.2–12.9)
POTASSIUM SERPL-SCNC: 3.3 MMOL/L (ref 3.5–5.1)
RBC # BLD AUTO: 3.86 M/UL (ref 4.6–6.2)
SODIUM SERPL-SCNC: 145 MMOL/L (ref 136–145)
WBC # BLD AUTO: 19.87 K/UL (ref 3.9–12.7)

## 2022-05-20 PROCEDURE — 94640 AIRWAY INHALATION TREATMENT: CPT

## 2022-05-20 PROCEDURE — 25000003 PHARM REV CODE 250: Performed by: INTERNAL MEDICINE

## 2022-05-20 PROCEDURE — 27000221 HC OXYGEN, UP TO 24 HOURS

## 2022-05-20 PROCEDURE — 84100 ASSAY OF PHOSPHORUS: CPT | Performed by: NURSE PRACTITIONER

## 2022-05-20 PROCEDURE — 63600175 PHARM REV CODE 636 W HCPCS: Performed by: HOSPITALIST

## 2022-05-20 PROCEDURE — 63600175 PHARM REV CODE 636 W HCPCS: Performed by: INTERNAL MEDICINE

## 2022-05-20 PROCEDURE — 25000003 PHARM REV CODE 250: Performed by: HOSPITALIST

## 2022-05-20 PROCEDURE — 85025 COMPLETE CBC W/AUTO DIFF WBC: CPT | Performed by: HOSPITALIST

## 2022-05-20 PROCEDURE — 80048 BASIC METABOLIC PNL TOTAL CA: CPT | Performed by: HOSPITALIST

## 2022-05-20 PROCEDURE — 25000242 PHARM REV CODE 250 ALT 637 W/ HCPCS: Performed by: INTERNAL MEDICINE

## 2022-05-20 PROCEDURE — 11000001 HC ACUTE MED/SURG PRIVATE ROOM

## 2022-05-20 PROCEDURE — 94760 N-INVAS EAR/PLS OXIMETRY 1: CPT

## 2022-05-20 PROCEDURE — 83735 ASSAY OF MAGNESIUM: CPT | Performed by: HOSPITALIST

## 2022-05-20 RX ADMIN — IPRATROPIUM BROMIDE AND ALBUTEROL SULFATE 3 ML: 2.5; .5 SOLUTION RESPIRATORY (INHALATION) at 01:05

## 2022-05-20 RX ADMIN — SODIUM CHLORIDE 15 ML/HR: 0.9 INJECTION, SOLUTION INTRAVENOUS at 03:05

## 2022-05-20 RX ADMIN — ENOXAPARIN SODIUM 40 MG: 100 INJECTION SUBCUTANEOUS at 05:05

## 2022-05-20 RX ADMIN — VANCOMYCIN HYDROCHLORIDE 1500 MG: 1.5 INJECTION, POWDER, LYOPHILIZED, FOR SOLUTION INTRAVENOUS at 08:05

## 2022-05-20 RX ADMIN — CARVEDILOL 6.25 MG: 6.25 TABLET, FILM COATED ORAL at 08:05

## 2022-05-20 RX ADMIN — PIPERACILLIN AND TAZOBACTAM 4.5 G: 4; .5 INJECTION, POWDER, FOR SOLUTION INTRAVENOUS at 06:05

## 2022-05-20 RX ADMIN — LOSARTAN POTASSIUM 100 MG: 25 TABLET, FILM COATED ORAL at 08:05

## 2022-05-20 RX ADMIN — IPRATROPIUM BROMIDE AND ALBUTEROL SULFATE 3 ML: 2.5; .5 SOLUTION RESPIRATORY (INHALATION) at 07:05

## 2022-05-20 RX ADMIN — FUROSEMIDE 40 MG: 10 INJECTION, SOLUTION INTRAMUSCULAR; INTRAVENOUS at 05:05

## 2022-05-20 RX ADMIN — AMLODIPINE BESYLATE 10 MG: 5 TABLET ORAL at 08:05

## 2022-05-20 RX ADMIN — CETIRIZINE HYDROCHLORIDE 10 MG: 10 TABLET, FILM COATED ORAL at 08:05

## 2022-05-20 RX ADMIN — CLOPIDOGREL 75 MG: 75 TABLET, FILM COATED ORAL at 08:05

## 2022-05-20 RX ADMIN — PIPERACILLIN AND TAZOBACTAM 4.5 G: 4; .5 INJECTION, POWDER, FOR SOLUTION INTRAVENOUS at 10:05

## 2022-05-20 RX ADMIN — ASPIRIN 81 MG CHEWABLE TABLET 81 MG: 81 TABLET CHEWABLE at 08:05

## 2022-05-20 RX ADMIN — FUROSEMIDE 40 MG: 10 INJECTION, SOLUTION INTRAMUSCULAR; INTRAVENOUS at 06:05

## 2022-05-20 RX ADMIN — PREDNISONE 40 MG: 20 TABLET ORAL at 08:05

## 2022-05-20 RX ADMIN — CARVEDILOL 6.25 MG: 6.25 TABLET, FILM COATED ORAL at 09:05

## 2022-05-20 RX ADMIN — ATORVASTATIN CALCIUM 80 MG: 40 TABLET, FILM COATED ORAL at 08:05

## 2022-05-20 RX ADMIN — PIPERACILLIN AND TAZOBACTAM 4.5 G: 4; .5 INJECTION, POWDER, FOR SOLUTION INTRAVENOUS at 03:05

## 2022-05-20 RX ADMIN — FLUTICASONE PROPIONATE 100 MCG: 50 SPRAY, METERED NASAL at 08:05

## 2022-05-20 NOTE — ASSESSMENT & PLAN NOTE
Concern for post obstructive PNA  Continue zosyn, azithromycin for now  ET aspirate culture no growth

## 2022-05-20 NOTE — SUBJECTIVE & OBJECTIVE
Interval History: feeling better.    Review of Systems   HENT:  Negative for ear discharge and ear pain.    Eyes:  Negative for discharge and itching.   Endocrine: Negative for cold intolerance and heat intolerance.   Neurological:  Negative for seizures and syncope.   Objective:     Vital Signs (Most Recent):  Temp: 98.1 °F (36.7 °C) (05/20/22 1140)  Pulse: 81 (05/20/22 1323)  Resp: 18 (05/20/22 1323)  BP: 125/72 (05/20/22 1140)  SpO2: 96 % (05/20/22 1323) Vital Signs (24h Range):  Temp:  [97.8 °F (36.6 °C)-98.6 °F (37 °C)] 98.1 °F (36.7 °C)  Pulse:  [] 81  Resp:  [16-38] 18  SpO2:  [88 %-100 %] 96 %  BP: (117-170)/(65-94) 125/72     Weight: 90.2 kg (198 lb 13.7 oz)  Body mass index is 28.53 kg/m².    Intake/Output Summary (Last 24 hours) at 5/20/2022 1350  Last data filed at 5/20/2022 0834  Gross per 24 hour   Intake 291.23 ml   Output 1425 ml   Net -1133.77 ml      Physical Exam  Vitals and nursing note reviewed.   Constitutional:       General: He is not in acute distress.     Appearance: He is not toxic-appearing.   HENT:      Head: Normocephalic and atraumatic.      Nose: Nose normal.      Mouth/Throat:      Mouth: Mucous membranes are moist.   Cardiovascular:      Rate and Rhythm: Normal rate and regular rhythm.      Pulses: Normal pulses.      Heart sounds: Normal heart sounds. No murmur heard.    No gallop.   Pulmonary:      Effort: Pulmonary effort is normal. No respiratory distress.      Breath sounds: No wheezing or rales.   Abdominal:      General: Bowel sounds are normal. There is no distension.      Palpations: Abdomen is soft.      Tenderness: There is no abdominal tenderness. There is no guarding.   Musculoskeletal:      Right lower leg: No edema.      Left lower leg: No edema.   Skin:     General: Skin is warm and dry.   Neurological:      Mental Status: He is alert and oriented to person, place, and time.       Significant Labs: All pertinent labs within the past 24 hours have been  reviewed.  BMP:   Recent Labs   Lab 05/20/22  0326   GLU 94      K 3.3*      CO2 29   BUN 21   CREATININE 1.0   CALCIUM 8.8   MG 1.9     CBC:   Recent Labs   Lab 05/19/22  0416 05/20/22  0326   WBC 25.05* 19.87*   HGB 11.7* 12.1*   HCT 34.8* 37.1*    303       Significant Imaging: I have reviewed all pertinent imaging results/findings within the past 24 hours.

## 2022-05-20 NOTE — NURSING
[facility] [place]  Nurses Note -- 4 Eyes      5/19/2022       Skin assessed on: Transfer from ICU     [x] No Pressure Injuries Present    [x]Prevention Measures Documented    [x] Yes LDA Previously documented     [] Yes New Pressure Injury Discovered   [] LDA Added      Attending RN:  Roseann Govea RN     Second RN: Brynn PetersonRN

## 2022-05-20 NOTE — PLAN OF CARE
Problem: Adult Inpatient Plan of Care  Goal: Plan of Care Review  Outcome: Ongoing, Progressing  Flowsheets (Taken 5/20/2022 0353)  Plan of Care Reviewed With: patient    Patient remains free from injury and falls. Oriented to self, place, year, and month. Having some episodes of confusion throughout shift, but easily re-oriented. Patient remains on 4 Liters O2. Sats dropped to 85-87% when sitting on side of bed. IV antibiotic administered per ordered. Patient resting comfortably. Denies pain. Plan of care continued.

## 2022-05-20 NOTE — PLAN OF CARE
05/20/22 1222   Discharge Reassessment   Assessment Type Discharge Planning Reassessment   Did the patient's condition or plan change since previous assessment? No   Discharge Plan discussed with: Spouse/sig other   Name(s) and Number(s) Natty Iverson (Spouse)   [phone]   Communicated [place] with patient/caregiver Yes   Discharge Plan A Hospice/home   Discharge Plan B Home with family   DME Needed Upon Discharge  none   Discharge Barriers Identified None   Why the patient remains in the hospital Requires continued medical care   Post-Acute Status   Post-Acute Authorization Hospice   Coverage Medicare   Patient choice form signed by patient/caregiver List with quality metrics by geographic area provided   Discharge Delays None known at this time

## 2022-05-20 NOTE — ASSESSMENT & PLAN NOTE
Patient admitted with Hypercapnic and Hypoxic which is Acute.  he is not on home oxygen. Signs/symptoms of respiratory failure include- tachypnea, increased work of breathing and respiratory distress present on admission.   - Labs and images were reviewed. Patient Has recent ABG, which has been reviewed..   - Supplemental oxygen was provided and noted- High Flow Nasal Cannula  4L HFNC   - Respiratory failure is due to- CHF, COPD and Pneumonia   - treat CHF with IV lasix. Repeat TTE improved from prior  - unknown if he has COPD- no prior PFTs. Given smoking history, will continue prednisone x5 days  - concern for post obstructive PNA. No growth on ET aspirate. On vanc, zosyn, azithro for now   Start deescalating ABX's.  Stop Vanc.

## 2022-05-20 NOTE — CONSULTS
Received inpatient consult for hospice. TN spoke with patient's spouse, Natty regarding discharge planning and to confirm choice for home hospice. Ms. Luz stated she was unable to review the list given by [facility] because it was thrown away when the ICU room was cleaned and pt was moved to the floor. TN emailed hospice list to Ms. Luz ([email or web address]). Will follow up for preference, Pt continues to require oxygen to be weaned and anticipate discharge over the weekend.

## 2022-05-21 ENCOUNTER — PATIENT MESSAGE (OUTPATIENT)
Dept: ADMINISTRATIVE | Facility: OTHER | Age: 62
End: 2022-05-21
Payer: MEDICARE

## 2022-05-21 LAB
ANION GAP SERPL CALC-SCNC: 10 MMOL/L (ref 8–16)
BACTERIA BLD CULT: NORMAL
BACTERIA BLD CULT: NORMAL
BASOPHILS # BLD AUTO: 0.02 K/UL (ref 0–0.2)
BASOPHILS NFR BLD: 0.1 % (ref 0–1.9)
BUN SERPL-MCNC: 23 MG/DL (ref 8–23)
CALCIUM SERPL-MCNC: 8.7 MG/DL (ref 8.7–10.5)
CHLORIDE SERPL-SCNC: 103 MMOL/L (ref 95–110)
CO2 SERPL-SCNC: 31 MMOL/L (ref 23–29)
CREAT SERPL-MCNC: 1 MG/DL (ref 0.5–1.4)
DIFFERENTIAL METHOD: ABNORMAL
EOSINOPHIL # BLD AUTO: 0 K/UL (ref 0–0.5)
EOSINOPHIL NFR BLD: 0.1 % (ref 0–8)
ERYTHROCYTE [DISTWIDTH] IN BLOOD BY AUTOMATED COUNT: 17.4 % (ref 11.5–14.5)
EST. GFR  (AFRICAN AMERICAN): >60 ML/MIN/1.73 M^2
EST. GFR  (NON AFRICAN AMERICAN): >60 ML/MIN/1.73 M^2
GLUCOSE SERPL-MCNC: 111 MG/DL (ref 70–110)
HCT VFR BLD AUTO: 38.3 % (ref 40–54)
HGB BLD-MCNC: 12.5 G/DL (ref 14–18)
IMM GRANULOCYTES # BLD AUTO: 0.08 K/UL (ref 0–0.04)
IMM GRANULOCYTES NFR BLD AUTO: 0.4 % (ref 0–0.5)
LYMPHOCYTES # BLD AUTO: 2.5 K/UL (ref 1–4.8)
LYMPHOCYTES NFR BLD: 13.3 % (ref 18–48)
MAGNESIUM SERPL-MCNC: 1.7 MG/DL (ref 1.6–2.6)
MCH RBC QN AUTO: 31.8 PG (ref 27–31)
MCHC RBC AUTO-ENTMCNC: 32.6 G/DL (ref 32–36)
MCV RBC AUTO: 98 FL (ref 82–98)
MONOCYTES # BLD AUTO: 2.2 K/UL (ref 0.3–1)
MONOCYTES NFR BLD: 11.5 % (ref 4–15)
NEUTROPHILS # BLD AUTO: 14.2 K/UL (ref 1.8–7.7)
NEUTROPHILS NFR BLD: 74.6 % (ref 38–73)
NRBC BLD-RTO: 0 /100 WBC
PHOSPHATE SERPL-MCNC: 4.2 MG/DL (ref 2.7–4.5)
PLATELET # BLD AUTO: 293 K/UL (ref 150–450)
PMV BLD AUTO: 10.3 FL (ref 9.2–12.9)
POTASSIUM SERPL-SCNC: 2.7 MMOL/L (ref 3.5–5.1)
RBC # BLD AUTO: 3.93 M/UL (ref 4.6–6.2)
SODIUM SERPL-SCNC: 144 MMOL/L (ref 136–145)
WBC # BLD AUTO: 19.08 K/UL (ref 3.9–12.7)

## 2022-05-21 PROCEDURE — 94640 AIRWAY INHALATION TREATMENT: CPT

## 2022-05-21 PROCEDURE — 63600175 PHARM REV CODE 636 W HCPCS: Performed by: INTERNAL MEDICINE

## 2022-05-21 PROCEDURE — 80048 BASIC METABOLIC PNL TOTAL CA: CPT | Performed by: HOSPITALIST

## 2022-05-21 PROCEDURE — 25000003 PHARM REV CODE 250: Performed by: INTERNAL MEDICINE

## 2022-05-21 PROCEDURE — 63600175 PHARM REV CODE 636 W HCPCS: Performed by: HOSPITALIST

## 2022-05-21 PROCEDURE — 25000003 PHARM REV CODE 250: Performed by: HOSPITALIST

## 2022-05-21 PROCEDURE — 25000242 PHARM REV CODE 250 ALT 637 W/ HCPCS: Performed by: INTERNAL MEDICINE

## 2022-05-21 PROCEDURE — 84100 ASSAY OF PHOSPHORUS: CPT | Performed by: NURSE PRACTITIONER

## 2022-05-21 PROCEDURE — 83735 ASSAY OF MAGNESIUM: CPT | Performed by: HOSPITALIST

## 2022-05-21 PROCEDURE — 27000221 HC OXYGEN, UP TO 24 HOURS

## 2022-05-21 PROCEDURE — 85025 COMPLETE CBC W/AUTO DIFF WBC: CPT | Performed by: HOSPITALIST

## 2022-05-21 PROCEDURE — 11000001 HC ACUTE MED/SURG PRIVATE ROOM

## 2022-05-21 RX ORDER — LEVOFLOXACIN 750 MG/1
750 TABLET ORAL DAILY
Status: DISCONTINUED | OUTPATIENT
Start: 2022-05-21 | End: 2022-05-22 | Stop reason: HOSPADM

## 2022-05-21 RX ORDER — POTASSIUM CHLORIDE 20 MEQ/1
40 TABLET, EXTENDED RELEASE ORAL 2 TIMES DAILY
Status: COMPLETED | OUTPATIENT
Start: 2022-05-21 | End: 2022-05-22

## 2022-05-21 RX ADMIN — LOSARTAN POTASSIUM 100 MG: 25 TABLET, FILM COATED ORAL at 08:05

## 2022-05-21 RX ADMIN — IPRATROPIUM BROMIDE AND ALBUTEROL SULFATE 3 ML: 2.5; .5 SOLUTION RESPIRATORY (INHALATION) at 12:05

## 2022-05-21 RX ADMIN — ATORVASTATIN CALCIUM 80 MG: 40 TABLET, FILM COATED ORAL at 08:05

## 2022-05-21 RX ADMIN — AMLODIPINE BESYLATE 10 MG: 5 TABLET ORAL at 08:05

## 2022-05-21 RX ADMIN — ENOXAPARIN SODIUM 40 MG: 100 INJECTION SUBCUTANEOUS at 06:05

## 2022-05-21 RX ADMIN — FLUTICASONE PROPIONATE 100 MCG: 50 SPRAY, METERED NASAL at 08:05

## 2022-05-21 RX ADMIN — PIPERACILLIN AND TAZOBACTAM 4.5 G: 4; .5 INJECTION, POWDER, FOR SOLUTION INTRAVENOUS at 01:05

## 2022-05-21 RX ADMIN — FUROSEMIDE 40 MG: 10 INJECTION, SOLUTION INTRAMUSCULAR; INTRAVENOUS at 06:05

## 2022-05-21 RX ADMIN — CARVEDILOL 6.25 MG: 6.25 TABLET, FILM COATED ORAL at 08:05

## 2022-05-21 RX ADMIN — FUROSEMIDE 40 MG: 10 INJECTION, SOLUTION INTRAMUSCULAR; INTRAVENOUS at 05:05

## 2022-05-21 RX ADMIN — IPRATROPIUM BROMIDE AND ALBUTEROL SULFATE 3 ML: 2.5; .5 SOLUTION RESPIRATORY (INHALATION) at 07:05

## 2022-05-21 RX ADMIN — LEVOFLOXACIN 750 MG: 750 TABLET, FILM COATED ORAL at 03:05

## 2022-05-21 RX ADMIN — CLOPIDOGREL 75 MG: 75 TABLET, FILM COATED ORAL at 08:05

## 2022-05-21 RX ADMIN — PIPERACILLIN AND TAZOBACTAM 4.5 G: 4; .5 INJECTION, POWDER, FOR SOLUTION INTRAVENOUS at 11:05

## 2022-05-21 RX ADMIN — PREDNISONE 40 MG: 20 TABLET ORAL at 08:05

## 2022-05-21 RX ADMIN — POTASSIUM CHLORIDE 40 MEQ: 1500 TABLET, EXTENDED RELEASE ORAL at 08:05

## 2022-05-21 RX ADMIN — ASPIRIN 81 MG CHEWABLE TABLET 81 MG: 81 TABLET CHEWABLE at 08:05

## 2022-05-21 RX ADMIN — CETIRIZINE HYDROCHLORIDE 10 MG: 10 TABLET, FILM COATED ORAL at 08:05

## 2022-05-21 NOTE — SUBJECTIVE & OBJECTIVE
Interval History: no new complaints.    Review of Systems   HENT:  Negative for ear discharge and ear pain.    Eyes:  Negative for discharge and itching.   Endocrine: Negative for cold intolerance and heat intolerance.   Neurological:  Negative for seizures and syncope.   Objective:     Vital Signs (Most Recent):  Temp: 97.8 °F (36.6 °C) (05/21/22 1115)  Pulse: 81 (05/21/22 1245)  Resp: 18 (05/21/22 1245)  BP: 139/81 (05/21/22 1115)  SpO2: 95 % (05/21/22 1245) Vital Signs (24h Range):  Temp:  [97.8 °F (36.6 °C)-98.2 °F (36.8 °C)] 97.8 °F (36.6 °C)  Pulse:  [58-81] 81  Resp:  [17-19] 18  SpO2:  [94 %-100 %] 95 %  BP: (110-139)/(68-82) 139/81     Weight: 88.6 kg (195 lb 5.2 oz)  Body mass index is 28.03 kg/m².    Intake/Output Summary (Last 24 hours) at 5/21/2022 1429  Last data filed at 5/21/2022 1237  Gross per 24 hour   Intake 720 ml   Output 2250 ml   Net -1530 ml        Physical Exam  Vitals and nursing note reviewed.   Constitutional:       General: He is not in acute distress.     Appearance: He is not toxic-appearing.   HENT:      Head: Normocephalic and atraumatic.      Nose: Nose normal.      Mouth/Throat:      Mouth: Mucous membranes are moist.   Cardiovascular:      Rate and Rhythm: Normal rate and regular rhythm.      Pulses: Normal pulses.      Heart sounds: Normal heart sounds. No murmur heard.    No gallop.   Pulmonary:      Effort: Pulmonary effort is normal. No respiratory distress.      Breath sounds: No wheezing or rales.   Abdominal:      General: Bowel sounds are normal. There is no distension.      Palpations: Abdomen is soft.      Tenderness: There is no abdominal tenderness. There is no guarding.   Musculoskeletal:      Right lower leg: No edema.      Left lower leg: No edema.   Skin:     General: Skin is warm and dry.   Neurological:      Mental Status: He is alert and oriented to person, place, and time.       Significant Labs: All pertinent labs within the past 24 hours have been  reviewed.  BMP:   Recent Labs   Lab 05/21/22  0423   *      K 2.7*      CO2 31*   BUN 23   CREATININE 1.0   CALCIUM 8.7   MG 1.7       CBC:   Recent Labs   Lab 05/20/22  0326 05/21/22  0423   WBC 19.87* 19.08*   HGB 12.1* 12.5*   HCT 37.1* 38.3*    293         Significant Imaging: I have reviewed all pertinent imaging results/findings within the past 24 hours.

## 2022-05-21 NOTE — ASSESSMENT & PLAN NOTE
Patient admitted with Hypercapnic and Hypoxic which is Acute.  he is not on home oxygen. Signs/symptoms of respiratory failure include- tachypnea, increased work of breathing and respiratory distress present on admission.   - Labs and images were reviewed. Patient Has recent ABG, which has been reviewed..   - Supplemental oxygen was provided and noted- High Flow Nasal Cannula  4L HFNC   - Respiratory failure is due to- CHF, COPD and Pneumonia   - treat CHF with IV lasix. Repeat TTE improved from prior  - unknown if he has COPD- no prior PFTs. Given smoking history, will continue prednisone x5 days  - concern for post obstructive PNA. No growth on ET aspirate.  Stop IV ABx's.  Oral Levaquin for 3 more days.  Assess home oxygen needs tomorrow and possibly home.

## 2022-05-21 NOTE — PROGRESS NOTES
"[facility] Medicine  Progress Note    Patient Name: Kashif Iverson  MRN: [id number]  Patient Class: IP- Inpatient   Admission Date: 5/17/2022  Length of Stay: 4 days  Attending Physician: Ant Martin MD  Primary Care Provider: Eduardo Ruiz MD        Subjective:     Principal Problem:Acute respiratory failure with hypoxia and hypercapnia        HPI:  Mr. 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  He is followed by Dr. Hathaway in Oncology, last seeing him in clinic on 4/29.22.  At that time he noted, "On 8/30/2019 he underwent bronchoscopy that showed 'a partially obstructing (about 80% obstructed) mass was found in the middle portion in the left mainstem bronchus. The mass was large and bloody, circumferential, endobronchial, friable and necrotic. 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  Completed 4 cycles and then 2/10/2021 scans showed progression.  2/24/2021 he was subsequent started on gemcitabine with Dr. Cruz."    Other medical issues include SCHF, macrocytic anemia, bronchiectasis, NSTEMI with non-obstructive CAD on [facility] (follows with Dr. Roman), MAT, and PAD.  His last TTE was on 2/23/22, and showed an estimated ejection fraction of 35-40%, normal RV systolic function (estimated PA systolic pressure is greater than 22 mmHg), and grade I left ventricular diastolic dysfunction.    He now comes to the ED after developing acute SOB today.  His wife is at the bedside and assists with the history.  Apparently he awoke this morning for chemo and immediately became very anxious and had, "a panic attack, like he always does on chemo days."  His wife settled him down and he improved enough to be driven to  Onc.  On walking from the parking lot to the Onc clinic he " "was severely dyspneic.  He was found in clinic to have an O2 sat of 85%, so he was referred to the ED.  He denies fever or chills, but does report some cough and leg swelling.  He denies N/V/D.    In the ED his VS's showed BP 90/59-->130/79 (BP Location: Left arm, Patient Position: Lying)   Pulse 111-->66   Temp Tm 97.7F, Tc 97.6 °F (36.4 °C) (Axillary)   Resp 18   Ht 5' 10" (1.778 m)   Wt 95.4 kg (210 lb 5.1 oz)   SpO2 87%-->100%  BMI 30.18 kg/m².  Labs showed WBC 13.6, PLT clumped, D-dimer 0.84, Na 135, Cr 0.9, gluc 146, TB 1.3, AT 42, LA 8.7-->4, BNP 1051, Trop 0.016, COVID negative and Flu negative.  Initial ABG showed pH 7.26, PCO2 54, PAO2 286 with follow up VBG showing pH 7.38 and PCO2 of 42.  EKG showed NSR 73, Normal sinus rhythm, incomplete right bundle branch block, and nonspecific ST and T wave abnormality.    CTA chest and CT abd/pelvis showed no evidence of pulmonary embolism.  There was a moderate pleural effusion on the right and mild pleural effusion on the left.  There is associated bilateral atelectasis and mild ground-glass infiltrate or edema.  There was airspace disease in left upper lobe and superior left lower lobe with slight nodular components.  This could represent neoplasm.  There was scarring, pneumonitis and or post treatment changes not excluded.  There was mild perinephric stranding right greater than left with minimal wall thickening of the renal pelvis on the right.  There was nondistention of the descending and sigmoid colon.  Minimal wall thickening is not excluded.    In the ED he was treated with ASA 325mg 1504, LR 1L 1618, Vanco 2g 1735.  Due to respiratory distress, he underwent RSI and was placed on the vent - 8.0 ETT secured 24 cm @ lips. Pt was placed on PRVC: 20/450/+5/80% per ER settings. OGT was placed and confirmed in the stomach by KUB.  Shortly after arrival to the ICU room, he self-extubated and ripped out his OGT.  He was placed on BiPAP, which he refused, " then placed on Venti mask.         Overview/Hospital Course:  Mr Kashif Iverson is a 61 y.o. man admitted with acute hypoxic respiratory failure. He has known SCLC on palliative chemotherapy and systolic/diastolic CHF. He was briefly intubated on 5/18 but self extubated a few hours later. Started IV lasix for CHF exacerbation, broad spectrum antibiotics for potential post obstructive pneumonia, and prednisone for potential COPD exacerbation (no PFTs to review). Respiratory failure seems most likely due to acute on chronic systolic/diastolic CHF as he improved rapidly with lasix. He is now on 3L HFNC. He has had episodes of anxiety that worsen his respiratory status.       Interval History: no new complaints.    Review of Systems   HENT:  Negative for ear discharge and ear pain.    Eyes:  Negative for discharge and itching.   Endocrine: Negative for cold intolerance and heat intolerance.   Neurological:  Negative for seizures and syncope.   Objective:     Vital Signs (Most Recent):  Temp: 97.8 °F (36.6 °C) (05/21/22 1115)  Pulse: 81 (05/21/22 1245)  Resp: 18 (05/21/22 1245)  BP: 139/81 (05/21/22 1115)  SpO2: 95 % (05/21/22 1245) Vital Signs (24h Range):  Temp:  [97.8 °F (36.6 °C)-98.2 °F (36.8 °C)] 97.8 °F (36.6 °C)  Pulse:  [58-81] 81  Resp:  [17-19] 18  SpO2:  [94 %-100 %] 95 %  BP: (110-139)/(68-82) 139/81     Weight: 88.6 kg (195 lb 5.2 oz)  Body mass index is 28.03 kg/m².    Intake/Output Summary (Last 24 hours) at 5/21/2022 1429  Last data filed at 5/21/2022 1237  Gross per 24 hour   Intake 720 ml   Output 2250 ml   Net -1530 ml        Physical Exam  Vitals and nursing note reviewed.   Constitutional:       General: He is not in acute distress.     Appearance: He is not toxic-appearing.   HENT:      Head: Normocephalic and atraumatic.      Nose: Nose normal.      Mouth/Throat:      Mouth: Mucous membranes are moist.   Cardiovascular:      Rate and Rhythm: Normal rate and regular rhythm.      Pulses: Normal pulses.       Heart sounds: Normal heart sounds. No murmur heard.    No gallop.   Pulmonary:      Effort: Pulmonary effort is normal. No respiratory distress.      Breath sounds: No wheezing or rales.   Abdominal:      General: Bowel sounds are normal. There is no distension.      Palpations: Abdomen is soft.      Tenderness: There is no abdominal tenderness. There is no guarding.   Musculoskeletal:      Right lower leg: No edema.      Left lower leg: No edema.   Skin:     General: Skin is warm and dry.   Neurological:      Mental Status: He is alert and oriented to person, place, and time.       Significant Labs: All pertinent labs within the past 24 hours have been reviewed.  BMP:   Recent Labs   Lab 05/21/22  0423   *      K 2.7*      CO2 31*   BUN 23   CREATININE 1.0   CALCIUM 8.7   MG 1.7       CBC:   Recent Labs   Lab 05/20/22  0326 05/21/22  0423   WBC 19.87* 19.08*   HGB 12.1* 12.5*   HCT 37.1* 38.3*    293         Significant Imaging: I have reviewed all pertinent imaging results/findings within the past 24 hours.      Assessment/Plan:      * Acute respiratory failure with hypoxia and hypercapnia  Patient admitted with Hypercapnic and Hypoxic which is Acute.  he is not on home oxygen. Signs/symptoms of respiratory failure include- tachypnea, increased work of breathing and respiratory distress present on admission.   - Labs and images were reviewed. Patient Has recent ABG, which has been reviewed..   - Supplemental oxygen was provided and noted- High Flow Nasal Cannula  4L HFNC   - Respiratory failure is due to- CHF, COPD and Pneumonia   - treat CHF with IV lasix. Repeat TTE improved from prior  - unknown if he has COPD- no prior PFTs. Given smoking history, will continue prednisone x5 days  - concern for post obstructive PNA. No growth on ET aspirate.  Stop IV ABx's.  Oral Levaquin for 3 more days.  Assess home oxygen needs tomorrow and possibly home.      Hypomagnesemia  Replace as  needed.        Goals of care, counseling/discussion  Advance Care Planning   Palliative care consulted.       Coronary artery disease involving native coronary artery of native heart without angina pectoris  Continue asa, plavix, statin         PAD (peripheral artery disease)  Continue ASA, Plavix, statin as above      Acute on chronic combined systolic and diastolic heart failure  Patient admitted with shortness of breath, edema consistent with acute on chronic systolic and diastolic CHF. Cause of exacerbation is unclear     His last TTE was on 2/23/22, and showed an estimated ejection fraction of 35-40%, normal RV systolic function (estimated PA systolic pressure is greater than 22 mmHg), and grade I left ventricular diastolic dysfunction.    BNP  Recent Labs   Lab 05/17/22  1428   BNP 1,051*     Start on IV lasix diuresis. Monitor ins and outs  TTE repeated- improved function  Resume home losartan  Change home atenolol to coreg for CHF  Nutrition consulted for low salt cardiac diet education  Cardiology follow up on discharge    Hypokalemia  Replace as needed.        Essential hypertension  BP normalized  Continue home amlodipine, losartan  Change home atenolol to coreg for CHF    Secondary malignant neoplasm of bone  Noted       Lung cancer, main bronchus, left  SCLC with obstructing mass in L mainstem bronchus and bone metastases. He is receiving palliative chemotherapy and radiation.   - Palliative care consult      Pneumonia  Concern for post obstructive PNA  Continue zosyn, azithromycin for now  ET aspirate culture no growth      VTE Risk Mitigation (From admission, onward)         Ordered     enoxaparin injection 40 mg  Daily         05/17/22 1926     Place NASIR hose  Until discontinued         05/17/22 1926     IP VTE HIGH RISK PATIENT  Once         05/17/22 1926     Place sequential compression device  Until discontinued         05/17/22 1926                Discharge Planning   AZ:      Code Status: Full  Code   Is the patient medically ready for discharge?:     Reason for patient still in hospital (select all that apply): Patient trending condition and Treatment  Discharge Plan A: Hospice/home   Discharge Delays: None known at this time              Ant Martin MD  Department of [facility] Medicine   [facility]

## 2022-05-21 NOTE — PLAN OF CARE
Problem: Adult Inpatient Plan of Care  Goal: Plan of Care Review  Outcome: Ongoing, Progressing  Goal: Patient-Specific Goal (Individualized)  Outcome: Ongoing, Progressing  Goal: Absence of Hospital-Acquired Illness or Injury  Outcome: Ongoing, Progressing  Goal: Optimal Comfort and Wellbeing  Outcome: Ongoing, Progressing  Goal: Readiness for Transition of Care  Outcome: Ongoing, Progressing     Problem: Infection  Goal: Absence of Infection Signs and Symptoms  Outcome: Ongoing, Progressing     Problem: Impaired Wound Healing  Goal: Optimal Wound Healing  Outcome: Ongoing, Progressing     Problem: Nutrition Impairment (Mechanical Ventilation, Invasive)  Goal: Optimal Nutrition Delivery  Outcome: Ongoing, Progressing     Problem: Fall Injury Risk  Goal: Absence of Fall and Fall-Related Injury  Outcome: Ongoing, Progressing     Problem: Coping Ineffective  Goal: Effective Coping  Outcome: Ongoing, Progressing     Problem: Skin Injury Risk Increased  Goal: Skin Health and Integrity  Outcome: Ongoing, Progressing     Problem: Adjustment to Illness (Sepsis/Septic Shock)  Goal: Optimal Coping  Outcome: Ongoing, Progressing     Problem: Bleeding (Sepsis/Septic Shock)  Goal: Absence of Bleeding  Outcome: Ongoing, Progressing     Problem: Glycemic Control Impaired (Sepsis/Septic Shock)  Goal: Blood Glucose Level Within Desired Range  Outcome: Ongoing, Progressing     Problem: Infection Progression (Sepsis/Septic Shock)  Goal: Absence of Infection Signs and Symptoms  Outcome: Ongoing, Progressing     Problem: Nutrition Impaired (Sepsis/Septic Shock)  Goal: Optimal Nutrition Intake  Outcome: Ongoing, Progressing     Problem: Fluid Imbalance (Pneumonia)  Goal: Fluid Balance  Outcome: Ongoing, Progressing     Problem: Infection (Pneumonia)  Goal: Resolution of Infection Signs and Symptoms  Outcome: Ongoing, Progressing     Problem: Respiratory Compromise (Pneumonia)  Goal: Effective Oxygenation and Ventilation  Outcome:  Ongoing, Progressing     No events throughout shift. POC reviewed w/ pt at bedside. Oxygen titrated from 4L to 2L nasal cannula. Scheduled medication administered. No c/o pain. Pt sitting at the edge of bed with meals. Bed in lowest position. Bed alarm is on and audible. Call light and personal items are in reach. Pt monitored throughout shift w/ purposeful rounding.

## 2022-05-22 ENCOUNTER — PATIENT MESSAGE (OUTPATIENT)
Dept: ADMINISTRATIVE | Facility: OTHER | Age: 62
End: 2022-05-22
Payer: MEDICARE

## 2022-05-22 VITALS
HEART RATE: 78 BPM | TEMPERATURE: 98 F | SYSTOLIC BLOOD PRESSURE: 136 MMHG | OXYGEN SATURATION: 98 % | BODY MASS INDEX: 28.09 KG/M2 | WEIGHT: 196.19 LBS | HEIGHT: 70 IN | RESPIRATION RATE: 18 BRPM | DIASTOLIC BLOOD PRESSURE: 87 MMHG

## 2022-05-22 PROBLEM — J96.01 ACUTE RESPIRATORY FAILURE WITH HYPOXIA AND HYPERCAPNIA: Status: RESOLVED | Noted: 2022-02-22 | Resolved: 2022-05-22

## 2022-05-22 PROBLEM — E83.42 HYPOMAGNESEMIA: Status: RESOLVED | Noted: 2022-05-18 | Resolved: 2022-05-22

## 2022-05-22 PROBLEM — J18.9 PNEUMONIA: Status: RESOLVED | Noted: 2019-08-22 | Resolved: 2022-05-22

## 2022-05-22 PROBLEM — I50.42 CHRONIC COMBINED SYSTOLIC AND DIASTOLIC HEART FAILURE: Status: ACTIVE | Noted: 2022-02-23

## 2022-05-22 PROBLEM — E87.6 HYPOKALEMIA: Status: RESOLVED | Noted: 2022-02-22 | Resolved: 2022-05-22

## 2022-05-22 PROBLEM — J96.02 ACUTE RESPIRATORY FAILURE WITH HYPOXIA AND HYPERCAPNIA: Status: RESOLVED | Noted: 2022-02-22 | Resolved: 2022-05-22

## 2022-05-22 PROCEDURE — 94640 AIRWAY INHALATION TREATMENT: CPT

## 2022-05-22 PROCEDURE — 25000003 PHARM REV CODE 250: Performed by: INTERNAL MEDICINE

## 2022-05-22 PROCEDURE — 27000221 HC OXYGEN, UP TO 24 HOURS

## 2022-05-22 PROCEDURE — 25000003 PHARM REV CODE 250: Performed by: HOSPITALIST

## 2022-05-22 PROCEDURE — 25000242 PHARM REV CODE 250 ALT 637 W/ HCPCS: Performed by: INTERNAL MEDICINE

## 2022-05-22 PROCEDURE — 63600175 PHARM REV CODE 636 W HCPCS: Performed by: INTERNAL MEDICINE

## 2022-05-22 PROCEDURE — 63600175 PHARM REV CODE 636 W HCPCS: Performed by: HOSPITALIST

## 2022-05-22 PROCEDURE — 94761 N-INVAS EAR/PLS OXIMETRY MLT: CPT

## 2022-05-22 RX ORDER — FUROSEMIDE 40 MG/1
40 TABLET ORAL 2 TIMES DAILY
Qty: 60 TABLET | Refills: 11 | Status: ON HOLD | OUTPATIENT
Start: 2022-05-22 | End: 2022-06-11 | Stop reason: SDUPTHER

## 2022-05-22 RX ORDER — CARVEDILOL 6.25 MG/1
6.25 TABLET ORAL 2 TIMES DAILY
Qty: 60 TABLET | Refills: 11 | Status: ON HOLD | OUTPATIENT
Start: 2022-05-22 | End: 2022-01-01 | Stop reason: HOSPADM

## 2022-05-22 RX ORDER — HEPARIN 100 UNIT/ML
10 SYRINGE INTRAVENOUS ONCE
Status: COMPLETED | OUTPATIENT
Start: 2022-05-22 | End: 2022-05-22

## 2022-05-22 RX ORDER — LEVOFLOXACIN 750 MG/1
750 TABLET ORAL DAILY
Qty: 4 TABLET | Refills: 0 | Status: SHIPPED | OUTPATIENT
Start: 2022-05-23 | End: 2022-05-27 | Stop reason: ALTCHOICE

## 2022-05-22 RX ORDER — ALBUTEROL SULFATE 90 UG/1
2 AEROSOL, METERED RESPIRATORY (INHALATION) EVERY 6 HOURS PRN
Qty: 18 G | Refills: 11 | Status: ON HOLD | OUTPATIENT
Start: 2022-05-22 | End: 2022-01-01 | Stop reason: SDUPTHER

## 2022-05-22 RX ORDER — PREDNISONE 20 MG/1
40 TABLET ORAL DAILY
Qty: 6 TABLET | Refills: 0 | Status: SHIPPED | OUTPATIENT
Start: 2022-05-23 | End: 2022-05-27 | Stop reason: ALTCHOICE

## 2022-05-22 RX ORDER — POTASSIUM CHLORIDE 20 MEQ/1
40 TABLET, EXTENDED RELEASE ORAL ONCE
Status: DISCONTINUED | OUTPATIENT
Start: 2022-05-22 | End: 2022-05-22 | Stop reason: HOSPADM

## 2022-05-22 RX ADMIN — PREDNISONE 40 MG: 20 TABLET ORAL at 08:05

## 2022-05-22 RX ADMIN — ATORVASTATIN CALCIUM 80 MG: 40 TABLET, FILM COATED ORAL at 08:05

## 2022-05-22 RX ADMIN — CETIRIZINE HYDROCHLORIDE 10 MG: 10 TABLET, FILM COATED ORAL at 08:05

## 2022-05-22 RX ADMIN — IPRATROPIUM BROMIDE AND ALBUTEROL SULFATE 3 ML: 2.5; .5 SOLUTION RESPIRATORY (INHALATION) at 12:05

## 2022-05-22 RX ADMIN — LOSARTAN POTASSIUM 100 MG: 25 TABLET, FILM COATED ORAL at 08:05

## 2022-05-22 RX ADMIN — AMLODIPINE BESYLATE 10 MG: 5 TABLET ORAL at 08:05

## 2022-05-22 RX ADMIN — FUROSEMIDE 40 MG: 10 INJECTION, SOLUTION INTRAMUSCULAR; INTRAVENOUS at 06:05

## 2022-05-22 RX ADMIN — ASPIRIN 81 MG CHEWABLE TABLET 81 MG: 81 TABLET CHEWABLE at 08:05

## 2022-05-22 RX ADMIN — CLOPIDOGREL 75 MG: 75 TABLET, FILM COATED ORAL at 08:05

## 2022-05-22 RX ADMIN — POTASSIUM CHLORIDE 40 MEQ: 1500 TABLET, EXTENDED RELEASE ORAL at 08:05

## 2022-05-22 RX ADMIN — CARVEDILOL 6.25 MG: 6.25 TABLET, FILM COATED ORAL at 08:05

## 2022-05-22 RX ADMIN — IPRATROPIUM BROMIDE AND ALBUTEROL SULFATE 3 ML: 2.5; .5 SOLUTION RESPIRATORY (INHALATION) at 02:05

## 2022-05-22 RX ADMIN — IPRATROPIUM BROMIDE AND ALBUTEROL SULFATE 3 ML: 2.5; .5 SOLUTION RESPIRATORY (INHALATION) at 08:05

## 2022-05-22 RX ADMIN — LEVOFLOXACIN 750 MG: 750 TABLET, FILM COATED ORAL at 08:05

## 2022-05-22 RX ADMIN — HEPARIN 10 UNITS: 100 SYRINGE at 04:05

## 2022-05-22 NOTE — PLAN OF CARE
Patient accepted for home health with [facility]. Patient will be seen tomorrow.        05/22/22 1056   Post-Acute Status   Post-Acute Authorization Home Health   Home Health Status Set-up Complete/Auth obtained   Coverage Medicare   Hospital Resources/Appts/Education Provided Appointments scheduled and added to AVS;Appointments scheduled by Navigator/Coordinator   Discharge Delays None known at this time   Discharge Plan   Discharge Plan A Home Health   Discharge Plan B Home with family

## 2022-05-22 NOTE — PLAN OF CARE
"Chart check complete, pt AAOx4, able to verbalize needs.  Tele box monitored.  IV"s flushed and saline locked.  Pt able to ambulate to the restroom and void with standby assistance, urinal in reach for use.  Pt on 2L NC, tolerating well.  Mild leg swelling noted.  No acute distress noted, pt free from falls or injury this shift.  Bed in low position, wheels locked, call light in reach for assistance, will continue to monitor.        Problem: Adult Inpatient Plan of Care  Goal: Plan of Care Review  Outcome: Ongoing, Progressing     Problem: Adult Inpatient Plan of Care  Goal: Patient-Specific Goal (Individualized)  Outcome: Ongoing, Progressing     Problem: Adult Inpatient Plan of Care  Goal: Absence of Hospital-Acquired Illness or Injury  Outcome: Ongoing, Progressing     Problem: Adult Inpatient Plan of Care  Goal: Optimal Comfort and Wellbeing  Outcome: Ongoing, Progressing     Problem: Impaired Wound Healing  Goal: Optimal Wound Healing  Outcome: Ongoing, Progressing     Problem: Fall Injury Risk  Goal: Absence of Fall and Fall-Related Injury  Outcome: Ongoing, Progressing     Problem: Coping Ineffective  Goal: Effective Coping  Outcome: Ongoing, Progressing     Problem: Fluid Imbalance (Pneumonia)  Goal: Fluid Balance  Outcome: Ongoing, Progressing     Problem: Infection (Pneumonia)  Goal: Resolution of Infection Signs and Symptoms  Outcome: Ongoing, Progressing     Problem: Respiratory Compromise (Pneumonia)  Goal: Effective Oxygenation and Ventilation  Outcome: Ongoing, Progressing     "

## 2022-05-22 NOTE — DISCHARGE SUMMARY
"[facility] Medicine  Discharge Summary      Patient Name: Kashif Iverson  MRN: [id number]  Patient Class: IP- Inpatient  Admission Date: 5/17/2022  Hospital Length of Stay: 5 days  Discharge Date and Time:  05/22/2022 9:57 AM  Attending Physician: Ant Martin MD   Discharging Provider: Ant Martin MD  Primary Care Provider: Eduardo Ruiz MD      HPI:   Mr. 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  He is followed by Dr. Hathaway in Oncology, last seeing him in clinic on 4/29.22.  At that time he noted, "On 8/30/2019 he underwent bronchoscopy that showed 'a partially obstructing (about 80% obstructed) mass was found in the middle portion in the left mainstem bronchus. The mass was large and bloody, circumferential, endobronchial, friable and necrotic. 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  Completed 4 cycles and then 2/10/2021 scans showed progression.  2/24/2021 he was subsequent started on gemcitabine with Dr. Cruz."    Other medical issues include SCHF, macrocytic anemia, bronchiectasis, NSTEMI with non-obstructive CAD on [facility] (follows with Dr. Roman), MAT, and PAD.  His last TTE was on 2/23/22, and showed an estimated ejection fraction of 35-40%, normal RV systolic function (estimated PA systolic pressure is greater than 22 mmHg), and grade I left ventricular diastolic dysfunction.    He now comes to the ED after developing acute SOB today.  His wife is at the bedside and assists with the history.  Apparently he awoke this morning for chemo and immediately became very anxious and had, "a panic attack, like he always does on chemo days."  His wife settled him down and he improved enough to be driven to [facility].  On walking from the parking lot to the Onc " "clinic he was severely dyspneic.  He was found in clinic to have an O2 sat of 85%, so he was referred to the ED.  He denies fever or chills, but does report some cough and leg swelling.  He denies N/V/D.    In the ED his VS's showed BP 90/59-->130/79 (BP Location: Left arm, Patient Position: Lying)   Pulse 111-->66   Temp Tm 97.7F, Tc 97.6 °F (36.4 °C) (Axillary)   Resp 18   Ht 5' 10" (1.778 m)   Wt 95.4 kg (210 lb 5.1 oz)   SpO2 87%-->100%  BMI 30.18 kg/m².  Labs showed WBC 13.6, PLT clumped, D-dimer 0.84, Na 135, Cr 0.9, gluc 146, TB 1.3, AT 42, LA 8.7-->4, BNP 1051, Trop 0.016, COVID negative and Flu negative.  Initial ABG showed pH 7.26, PCO2 54, PAO2 286 with follow up VBG showing pH 7.38 and PCO2 of 42.  EKG showed NSR 73, Normal sinus rhythm, incomplete right bundle branch block, and nonspecific ST and T wave abnormality.    CTA chest and CT abd/pelvis showed no evidence of pulmonary embolism.  There was a moderate pleural effusion on the right and mild pleural effusion on the left.  There is associated bilateral atelectasis and mild ground-glass infiltrate or edema.  There was airspace disease in left upper lobe and superior left lower lobe with slight nodular components.  This could represent neoplasm.  There was scarring, pneumonitis and or post treatment changes not excluded.  There was mild perinephric stranding right greater than left with minimal wall thickening of the renal pelvis on the right.  There was nondistention of the descending and sigmoid colon.  Minimal wall thickening is not excluded.    In the ED he was treated with ASA 325mg 1504, LR 1L 1618, Vanco 2g 1735.  Due to respiratory distress, he underwent RSI and was placed on the vent - 8.0 ETT secured 24 cm @ lips. Pt was placed on PRVC: 20/450/+5/80% per ER settings. OGT was placed and confirmed in the stomach by KUB.  Shortly after arrival to the ICU room, he self-extubated and ripped out his OGT.  He was placed on BiPAP, which he " refused, then placed on Venti mask.         * No surgery found *      Hospital Course:   Mr Kashif Iverson is a 61 y.o. man admitted with acute hypoxic respiratory failure. He has known SCLC on palliative chemotherapy and systolic/diastolic CHF. He was briefly intubated on 5/18 but self extubated a few hours later. Started IV lasix for CHF exacerbation, broad spectrum antibiotics for potential post obstructive pneumonia, and prednisone for potential COPD exacerbation (no PFTs to review). Respiratory failure seems most likely due to acute on chronic systolic/diastolic CHF as he improved rapidly with lasix. He has had episodes of anxiety that worsen his respiratory status.  He has remained afebrile and hemodynamically stable.  Able to wean down oxygen.  Will assess for home oxygen needs prior to discharge.  He has been transitioned to oral Levaquin.  Will increase home Lasix to bid.  Patient will be discharged home with .  He will be referred to Palliative Care.  Patient will follow up with PCP and Heme/Onc.       Goals of Care Treatment Preferences:  Code Status: Full Code          What is most important right now is to focus on spending time at home, avoiding the hospital, remaining as independent as possible, symptom/pain control, quality of life, even if it means sacrificing a little time.  Accordingly, we have decided that the best plan to meet the patient's goals includes enrolling in hospice care.      Consults:   Consults (From admission, onward)        Status Ordering Provider     Inpatient consult to Social Work  Once        Provider:  (Not yet assigned)    Completed RUBENS ANTUNEZ     Inpatient consult to Pulmonology  Once        Provider:  Vivek Adair MD    Completed KHUSHBU BRYANT     Inpatient consult to Palliative Care  Once        Provider:  Eli Arango NP    Completed KHUSHBU BRYANT     Inpatient consult to Hematology/Oncology - [facility]  Once        Provider:  Sameera Amador MD     Completed KHUSHBU BRYANT          No new Assessment & Plan notes have been filed under this hospital service since the last note was generated.  Service: Hospital Medicine    Final Active Diagnoses:    Diagnosis Date Noted POA    Goals of care, counseling/discussion [Z71.89] 05/18/2022 Not Applicable    Coronary artery disease involving native coronary artery of native heart without angina pectoris [I25.10] 03/14/2022 Yes    PAD (peripheral artery disease) [I73.9] 03/14/2022 Yes    Chronic combined systolic and diastolic heart failure [I50.42] 02/23/2022 Yes    Essential hypertension [I10]  Yes    Secondary malignant neoplasm of bone [C79.51] 09/24/2019 Yes    Lung cancer, main bronchus, left [C34.02] 09/10/2019 Yes      Problems Resolved During this Admission:    Diagnosis Date Noted Date Resolved POA    PRINCIPAL PROBLEM:  Acute respiratory failure with hypoxia and hypercapnia [J96.01, J96.02] 02/22/2022 05/22/2022 Yes    Hypomagnesemia [E83.42] 05/18/2022 05/22/2022 Yes    Transient hypotension [I95.9] 05/17/2022 05/18/2022 Yes    Sepsis [A41.9] 05/17/2022 05/20/2022 Yes    Hypokalemia [E87.6] 02/22/2022 05/22/2022 No    Pneumonia [J18.9] 08/22/2019 05/22/2022 Yes       Discharged Condition: stable    Disposition: Home-Health Care [facility]    Follow Up:   Follow-up Information     Eduardo Ruiz MD Follow up in 1 week(s).    Specialty: Family Medicine  Contact information:  [address] [place] [address]  [phone]             Mario Hathaway MD Follow up in 2 week(s).    Specialty: Hematology and Oncology  Contact information:  [address]  [facility] [address]  [phone]                       Patient Instructions:      Ambulatory referral/consult to CLINIC Palliative Care   Standing Status: Future   Referral Priority: Routine Referral Type: Consultation   Requested Specialty: Palliative Medicine   Number of Visits Requested: 1     Diet Cardiac     Notify your health care  provider if you experience any of the following:  temperature >100.4     Notify your health care provider if you experience any of the following:  persistent nausea and vomiting or diarrhea     Notify your health care provider if you experience any of the following:  difficulty breathing or increased cough     Notify your health care provider if you experience any of the following:  persistent dizziness, light-headedness, or visual disturbances     Notify your health care provider if you experience any of the following:  increased confusion or weakness     Activity as tolerated         Pending Diagnostic Studies:     None         Medications:  Reconciled Home Medications:      Medication List      START taking these medications    albuterol 90 mcg/actuation inhaler  Commonly known as: VENTOLIN HFA  Inhale 2 puffs into the lungs every 6 (six) hours as needed for Wheezing. Rescue     carvediloL 6.25 MG tablet  Commonly known as: COREG  Take 1 tablet (6.25 mg total) by mouth 2 (two) times daily.     levoFLOXacin 750 MG tablet  Commonly known as: LEVAQUIN  Take 1 tablet (750 mg total) by mouth once daily. for 4 days  Start taking on: May 23, 2022     predniSONE 20 MG tablet  Commonly known as: DELTASONE  Take 2 tablets (40 mg total) by mouth once daily. for 3 days  Start taking on: May 23, 2022        CHANGE how you take these medications    furosemide 40 MG tablet  Commonly known as: LASIX  Take 1 tablet (40 mg total) by mouth 2 (two) times a day.  What changed: when to take this        CONTINUE taking these medications    amLODIPine 10 MG tablet  Commonly known as: NORVASC  Take 1 tablet (10 mg total) by mouth once daily.     aspirin 81 MG Chew  Take 1 tablet (81 mg total) by mouth once daily.     atorvastatin 80 MG tablet  Commonly known as: LIPITOR  Take 1 tablet (80 mg total) by mouth once daily.     clopidogreL 75 mg tablet  Commonly known as: PLAVIX  Take 1 tablet (75 mg total) by mouth once daily.     losartan  100 MG tablet  Commonly known as: COZAAR  Take 1 tablet (100 mg total) by mouth once daily.     VITAMIN C ENERGY BOOSTER 1,000 mg Pwep  Generic drug: ascorbic acid-multivit-min  Take by mouth. Patient takes 3,000 mg per day        STOP taking these medications    atenoloL 100 MG tablet  Commonly known as: TENORMIN     LIDOcaine-prilocaine cream  Commonly known as: EMLA     ondansetron 8 MG tablet  Commonly known as: ZOFRAN            Indwelling Lines/Drains at time of discharge:   Lines/Drains/Airways     Central Venous Catheter Line  Duration           Port A Cath Single Lumen right subclavian -- days                Time spent on the discharge of patient: >30 minutes         Ant Martin MD  Department of Hospital Medicine  [facility] Surg

## 2022-05-22 NOTE — PLAN OF CARE
Patient approved for 1 tank of oxygen by Raina at [facility]. SW notified Leia in Respiratory.        05/22/22 1104   Post-Acute Status   Post-Acute Authorization [facility] Status Set-up Complete/Auth obtained   Coverage Medicare   Hospital Resources/Appts/Education Provided Appointments scheduled and added to AVS;Appointments scheduled by Navigator/Coordinator   Discharge Delays None known at this time   Discharge Plan   Discharge Plan A Home Health   Discharge Plan B Home with family

## 2022-05-22 NOTE — CARE UPDATE
O2 sats on RA at rest 92%    O2 sats on RA on exertion 85%    O2 sats on 2L on exertion/recovery 92%

## 2022-05-22 NOTE — PLAN OF CARE
"    [facility]    HOME HEALTH ORDERS  FACE TO FACE ENCOUNTER    Patient Name: Kashif Iverson  YOB: 1960    PCP: Eduardo Ruiz MD   PCP Address: [address] [place] [address]  PCP Phone Number: [phone]  PCP Fax: [phone]       Encounter Date: 05/22/2022    Admit to Home Health    Diagnoses:  Active Hospital Problems    Diagnosis  POA    Goals of care, counseling/discussion [Z71.89]  Not Applicable    Coronary artery disease involving native coronary artery of native heart without angina pectoris [I25.10]  Yes    PAD (peripheral artery disease) [I73.9]  Yes     LUIS FELIPE 3/11/22      Chronic combined systolic and diastolic heart failure [I50.42]  Yes    Essential hypertension [I10]  Yes    Secondary malignant neoplasm of bone [C79.51]  Yes    Lung cancer, main bronchus, left [C34.02]  Yes     8/30/2019 underwent bronchoscopy that showed "A partially obstructing (about 80% obstructed) mass was found in the middle portion in the left mainstem bronchus. The mass was large and bloody, circumferential, endobronchial, friable and necrotic. 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  No actionable mutations and PD-L1 5%.  9/19/21 staging PET CT showed "Hypermetabolic left lung mass concerning for primary pulmonary malignancy with metastatic disease involving the right 6th and 9th ribs as well as mediastinal and left supraclavicular lymph nodes."  Stage IV squamous cell carcinoma of the lung at diagnosis.    10/8/2019-10/21/2019 he received palliative chemoradiation for rib pain with carboplatin and paclitaxel.  He received 3 cycles of carboplatin and paclitaxel.  He developed anaphylactic reaction to paclitaxel.  The chest was treated with AP:PA fields and the right 9th rib was treated with anterior and posterior oblique fields at 300 cGy per fraction to 3000 cGy.   Lt chest 6X 300 10 / 10 3,000   Rt " 9th rib 6X 300 10 / 10 3,000     10/31/2019-9/10/2020 he received pembrolizumab (completed 15 cycles, last dose 8/21/2020)  9/10/2020 PET CT showed progression of disease.  He was then referred to Dr. Key for evaluation for clinical trials.  10/20/2020 he underwent repeat biopsy for LUNG MAP trial and was randomized to Ramucirumab + pembrolizumab.    11/17/2020 started ramucirumab+pembrolizumab.  Completed 4 cycles and then 2/10/2021 scans showed progression.    2/24/2021 he was subsequent started on gemcitabine with Dr. Cruz.        Resolved Hospital Problems    Diagnosis Date Resolved POA    *Acute respiratory failure with hypoxia and hypercapnia [J96.01, J96.02] 05/22/2022 Yes     Priority: 1 - High    Hypomagnesemia [E83.42] 05/22/2022 Yes    Transient hypotension [I95.9] 05/18/2022 Yes    Sepsis [A41.9] 05/20/2022 Yes    Hypokalemia [E87.6] 05/22/2022 No    Pneumonia [J18.9] 05/22/2022 Yes       Future Appointments   Date Time Provider Department Center   5/24/2022  2:20 PM Catia Roman MD [facility] CARDIO [facility]   5/26/2022  9:20 AM [facility] CT1 LIMIT 400 LBS [facility] CT SCAN [facility]   5/27/2022  2:00 PM Mario Hathaway MD [facility] HEM ONC [facility]   5/31/2022  1:00 PM CHAIR 04 [facility] CHEMO [facility]      Follow-up Information     Eduardo Ruiz MD Follow up in 1 week(s).    Specialty: Family Medicine  Contact information:  [address] [place] [address] [phone]             Mario Hathaway MD Follow up in 2 week(s).    Specialty: Hematology and Oncology  Contact information:  [address]  [facility] [address]  [phone]                           I have seen and examined this patient face to face today. My clinical findings that support the need for the home health skilled services and home bound status are the following:  Weakness/numbness causing balance and gait disturbance due to Heart Failure and Malignancy/Cancer making it taxing to leave home.    Allergies:Review of  patient's allergies indicates:  No Known Allergies    Diet: cardiac diet    Activities: activity as tolerated    Nursing:   SN to complete comprehensive assessment including routine vital signs. Instruct on disease process and s/s of complications to report to MD. Review/verify medication list sent home with the patient at time of discharge  and instruct patient/caregiver as needed. Frequency may be adjusted depending on start of care date.    Notify MD if SBP > 160 or < 90; DBP > 90 or < 50; HR > 120 or < 50; Temp > 101      CONSULTS:    Physical Therapy to evaluate and treat. Evaluate for home safety and equipment needs; Establish/upgrade home exercise program. Perform / instruct on therapeutic exercises, gait training, transfer training, and Range of Motion.  Occupational Therapy to evaluate and treat. Evaluate home environment for safety and equipment needs. Perform/Instruct on transfers, ADL training, ROM, and therapeutic exercises.     Home oxygen: O2 per NC @ 2L to wear on exertion and prn shortness of breath    Medications: Review discharge medications with patient and family and provide education.      Current Discharge Medication List      START taking these medications    Details   albuterol (VENTOLIN HFA) 90 mcg/actuation inhaler Inhale 2 puffs into the lungs every 6 (six) hours as needed for Wheezing. Rescue  Qty: 18 g, Refills: 11      carvediloL (COREG) 6.25 MG tablet Take 1 tablet (6.25 mg total) by mouth 2 (two) times daily.  Qty: 60 tablet, Refills: 11    Comments: .      levoFLOXacin (LEVAQUIN) 750 MG tablet Take 1 tablet (750 mg total) by mouth once daily. for 4 days  Qty: 4 tablet, Refills: 0      predniSONE (DELTASONE) 20 MG tablet Take 2 tablets (40 mg total) by mouth once daily. for 3 days  Qty: 6 tablet, Refills: 0         CONTINUE these medications which have CHANGED    Details   furosemide (LASIX) 40 MG tablet Take 1 tablet (40 mg total) by mouth 2 (two) times a day.  Qty: 60 tablet,  Refills: 11         CONTINUE these medications which have NOT CHANGED    Details   amLODIPine (NORVASC) 10 MG tablet Take 1 tablet (10 mg total) by mouth once daily.  Qty: 30 tablet, Refills: 11    Comments: .  Associated Diagnoses: Essential hypertension      ascorbic acid-multivit-min (VITAMIN C ENERGY BOOSTER) 1,000 mg PwEP Take by mouth. Patient takes 3,000 mg per day      aspirin 81 MG Chew Take 1 tablet (81 mg total) by mouth once daily.  Qty: 30 tablet, Refills: 11      atorvastatin (LIPITOR) 80 MG tablet Take 1 tablet (80 mg total) by mouth once daily.  Qty: 90 tablet, Refills: 3      clopidogreL (PLAVIX) 75 mg tablet Take 1 tablet (75 mg total) by mouth once daily.  Qty: 30 tablet, Refills: 11      losartan (COZAAR) 100 MG tablet Take 1 tablet (100 mg total) by mouth once daily.  Qty: 90 tablet, Refills: 3    Comments: .         STOP taking these medications       atenoloL (TENORMIN) 100 MG tablet Comments:   Reason for Stopping:         LIDOcaine-prilocaine (EMLA) cream Comments:   Reason for Stopping:         ondansetron (ZOFRAN) 8 MG tablet Comments:   Reason for Stopping:               I certify that this patient is confined to his home and needs intermittent skilled nursing care, physical therapy and occupational therapy.

## 2022-05-22 NOTE — PLAN OF CARE
[facility] [place] - Med Surg  Discharge Final Note    Primary Care Provider: Eduardo Ruiz MD    Expected Discharge Date: 5/22/2022     All needs met. RODOLFO reviewed upcoming appointments with wife, Natty. RODOLFO notified Natty that patient was approved for oxygen and [facility] will be calling to coordinate delivery. RODOLFO informed Natty that [facility] will be providing home health services and will visit patient tomorrow. RODOLFO notified nurse Massiel that patient is ready for discharge from case management standpoint.     Final Discharge Note (most recent)     Final Note - 05/22/22 1108        Final Note    Assessment Type Final Discharge Note     Anticipated Discharge Disposition Home-Health Care Svc     What phone number can be called within the next 1-3 days to see how you are doing after discharge? [phone]     Hospital Resources/Appts/Education Provided Appointments scheduled and added to AVS;Appointments scheduled by Navigator/Coordinator        Post-Acute Status    Post-Acute Authorization HME;Home Health     HME Status Set-up Complete/Auth obtained     Home Health Status Set-up Complete/Auth obtained     Coverage Medicare     Discharge Delays None known at this time                 Important Message from Medicare             Contact Info     [facility]   Specialty: Home Health Services, Home Therapy Services, Home Living Aide Services    [address] [address]   Phone: [phone]       Next Steps: Follow up on 5/23/2022    Instructions: Home Health agency will call to schedule a time for inital visit.    [facility]   Specialty: DME Provider    [address] [place] [id number]   Phone: [phone]       Next Steps: Follow up

## 2022-05-22 NOTE — PLAN OF CARE
Problem: Adult Inpatient Plan of Care  Goal: Plan of Care Review  Outcome: Ongoing, Progressing  Goal: Patient-Specific Goal (Individualized)  Outcome: Ongoing, Progressing  Goal: Absence of Hospital-Acquired Illness or Injury  Outcome: Ongoing, Progressing  Goal: Optimal Comfort and Wellbeing  Outcome: Ongoing, Progressing  Goal: Readiness for Transition of Care  Outcome: Ongoing, Progressing     Problem: Infection  Goal: Absence of Infection Signs and Symptoms  Outcome: Ongoing, Progressing     Problem: Impaired Wound Healing  Goal: Optimal Wound Healing  Outcome: Ongoing, Progressing     Problem: Nutrition Impairment (Mechanical Ventilation, Invasive)  Goal: Optimal Nutrition Delivery  Outcome: Ongoing, Progressing     Problem: Fall Injury Risk  Goal: Absence of Fall and Fall-Related Injury  Outcome: Ongoing, Progressing     Problem: Coping Ineffective  Goal: Effective Coping  Outcome: Ongoing, Progressing     Problem: Skin Injury Risk Increased  Goal: Skin Health and Integrity  Outcome: Ongoing, Progressing     Problem: Adjustment to Illness (Sepsis/Septic Shock)  Goal: Optimal Coping  Outcome: Ongoing, Progressing     Problem: Bleeding (Sepsis/Septic Shock)  Goal: Absence of Bleeding  Outcome: Ongoing, Progressing     Problem: Glycemic Control Impaired (Sepsis/Septic Shock)  Goal: Blood Glucose Level Within Desired Range  Outcome: Ongoing, Progressing     Problem: Infection Progression (Sepsis/Septic Shock)  Goal: Absence of Infection Signs and Symptoms  Outcome: Ongoing, Progressing     Problem: Nutrition Impaired (Sepsis/Septic Shock)  Goal: Optimal Nutrition Intake  Outcome: Ongoing, Progressing     Problem: Fluid Imbalance (Pneumonia)  Goal: Fluid Balance  Outcome: Ongoing, Progressing     Problem: Infection (Pneumonia)  Goal: Resolution of Infection Signs and Symptoms  Outcome: Ongoing, Progressing     Problem: Respiratory Compromise (Pneumonia)  Goal: Effective Oxygenation and Ventilation  Outcome:  Ongoing, Progressing

## 2022-05-22 NOTE — PLAN OF CARE
RODOFLO spoke with patient's wife about discharge planning. SW inquired about home health preferences. Wife stated she didn't have a preference. [facility]. [facility] agreed to [facility]. RODOLFO sent referral via [employer].        05/22/22 1025   Post-Acute Status   Post-Acute Authorization Home Health   Home Health Status Pending post-acute provider review/more information requested   Coverage Medicare   Discharge Delays (!) Post-Acute Set-up   Discharge Plan   Discharge Plan A Home Health   Discharge Plan B Home with family

## 2022-05-24 ENCOUNTER — OFFICE VISIT (OUTPATIENT)
Dept: CARDIOLOGY | Facility: CLINIC | Age: 62
End: 2022-05-24
Payer: MEDICARE

## 2022-05-24 ENCOUNTER — TELEPHONE (OUTPATIENT)
Dept: FAMILY MEDICINE | Facility: CLINIC | Age: 62
End: 2022-05-24
Payer: MEDICARE

## 2022-05-24 VITALS
DIASTOLIC BLOOD PRESSURE: 68 MMHG | SYSTOLIC BLOOD PRESSURE: 131 MMHG | WEIGHT: 202.25 LBS | RESPIRATION RATE: 18 BRPM | BODY MASS INDEX: 28.95 KG/M2 | HEART RATE: 96 BPM | OXYGEN SATURATION: 95 % | HEIGHT: 70 IN

## 2022-05-24 DIAGNOSIS — I25.2 HISTORY OF NON-ST ELEVATION MYOCARDIAL INFARCTION (NSTEMI): ICD-10-CM

## 2022-05-24 DIAGNOSIS — I25.10 CORONARY ARTERY DISEASE INVOLVING NATIVE CORONARY ARTERY OF NATIVE HEART WITHOUT ANGINA PECTORIS: ICD-10-CM

## 2022-05-24 DIAGNOSIS — C79.51 SECONDARY MALIGNANT NEOPLASM OF BONE: ICD-10-CM

## 2022-05-24 DIAGNOSIS — Z12.11 COLON CANCER SCREENING: Primary | ICD-10-CM

## 2022-05-24 DIAGNOSIS — I70.0 ATHEROSCLEROSIS OF AORTA: Primary | ICD-10-CM

## 2022-05-24 DIAGNOSIS — I50.42 CHRONIC COMBINED SYSTOLIC AND DIASTOLIC HEART FAILURE: ICD-10-CM

## 2022-05-24 DIAGNOSIS — C34.02 LUNG CANCER, MAIN BRONCHUS, LEFT: ICD-10-CM

## 2022-05-24 DIAGNOSIS — I73.9 PAD (PERIPHERAL ARTERY DISEASE): ICD-10-CM

## 2022-05-24 DIAGNOSIS — I47.19 MULTIFOCAL ATRIAL TACHYCARDIA: ICD-10-CM

## 2022-05-24 DIAGNOSIS — Z51.5 PALLIATIVE CARE ENCOUNTER: ICD-10-CM

## 2022-05-24 DIAGNOSIS — Z86.73 HISTORY OF COMPLETED STROKE: ICD-10-CM

## 2022-05-24 DIAGNOSIS — I10 ESSENTIAL HYPERTENSION: ICD-10-CM

## 2022-05-24 PROCEDURE — 99999 PR PBB SHADOW E&M-EST. PATIENT-LVL IV: CPT | Mod: PBBFAC,,, | Performed by: INTERNAL MEDICINE

## 2022-05-24 PROCEDURE — 99214 PR OFFICE/OUTPT VISIT, EST, LEVL IV, 30-39 MIN: ICD-10-PCS | Mod: S$PBB,,, | Performed by: INTERNAL MEDICINE

## 2022-05-24 PROCEDURE — G0180 PR HOME HEALTH MD CERTIFICATION: ICD-10-PCS | Mod: ,,, | Performed by: HOSPITALIST

## 2022-05-24 PROCEDURE — 99214 OFFICE O/P EST MOD 30 MIN: CPT | Mod: PBBFAC | Performed by: INTERNAL MEDICINE

## 2022-05-24 PROCEDURE — 99214 OFFICE O/P EST MOD 30 MIN: CPT | Mod: S$PBB,,, | Performed by: INTERNAL MEDICINE

## 2022-05-24 PROCEDURE — G0180 MD CERTIFICATION HHA PATIENT: HCPCS | Mod: ,,, | Performed by: HOSPITALIST

## 2022-05-24 PROCEDURE — 99999 PR PBB SHADOW E&M-EST. PATIENT-LVL IV: ICD-10-PCS | Mod: PBBFAC,,, | Performed by: INTERNAL MEDICINE

## 2022-05-24 RX ORDER — CILOSTAZOL 100 MG/1
100 TABLET ORAL 2 TIMES DAILY
Qty: 60 TABLET | Refills: 11 | Status: SHIPPED | OUTPATIENT
Start: 2022-05-24 | End: 2022-01-01 | Stop reason: SDUPTHER

## 2022-05-24 NOTE — PROGRESS NOTES
CARDIOVASCULAR CONSULTATION    REASON FOR CONSULT:   Kashif Iverson is a 61 y.o. male who presents for follow-up.      HISTORY OF PRESENT ILLNESS:     Patient is a pleasant 61-year-old male.  Saw Dr. Green and me in the hospital, now wants to follow up with me.  During the hospital stay Dr. Green had performed a coronary angiogram.  That demonstrated nonobstructive CAD.  He was admitted with shock and he when she has recovered and doing fine.  Denies any chest pains at rest on exertion, orthopnea, PND.  Has history of lung cancer and lung mass.  EF on echo was 35-40%.      Notes April 2022:  Patient here for follow-up.  Denies any chest pains at rest on exertion, orthopnea, PND.    May 22:  Patient here for.  Denies chest pains at rest on exertion.  Does have claudication but cannot tell me which leg is worse.  He states he will have to walk and figure out which leg hurts more.  Denies any resting leg pains or ulcers.       · The left ventricle is normal in size with concentric hypertrophy and normal systolic function.  · The estimated ejection fraction is 65%.  · Indeterminate left ventricular diastolic function.  · Mild right ventricular enlargement with normal right ventricular systolic function.  · Mild left atrial enlargement.  · Mild right atrial enlargement.  · Mild tricuspid regurgitation.  · Normal central venous pressure (3 mmHg).  · The estimated PA systolic pressure is 53 mmHg.  · There is pulmonary hypertension.        Right leg:  >50% PFA stenosis  Mid SFA occlusion  Moderately decreased LUIS FELIPE, 0.48.  Bi/monophasic waveforms.     Left leg:  >50% CFA stenosis  Biphasic waveforms  Borderline normal LUIS FELIPE, 0.98.      Suprarenal aortic ectasia without evidence of AAA, max diam 2.7cm.  Aortic atherosclerosis.  Increased flow velocity across L MORIAH suggesting >50% stenosis.        · Right resting LUIS FELIPE 0.29, is suggestive of severe right lower extremity arterial disease sufficient to cause rest pain.  · Left  resting LUIS FELIPE 0.92, is suggestive of minimal left lower extremity arterial disease.  · PVR waveforms are abnormal at the calf and ankle level on the right.          PAST MEDICAL HISTORY:     Past Medical History:   Diagnosis Date    Combined systolic and diastolic heart failure     Hypertension     Lung cancer     NSCLC    Lung mass     left lung       PAST SURGICAL HISTORY:     Past Surgical History:   Procedure Laterality Date    BRONCHOSCOPY N/A 8/30/2019    Procedure: Bronchoscopy;  Surgeon: Miguel Diagnostic Provider;  Location: [facility] OR [address] [place] [address];  Service: Anesthesiology;  Laterality: N/A;    CORONARY ANGIOGRAPHY N/A 3/4/2022    Procedure: ANGIOGRAM, CORONARY ARTERY;  Surgeon: Robson Green MD;  Location: [facility] CATH LAB;  Service: Cardiology;  Laterality: N/A;       ALLERGIES AND MEDICATION:   Review of patient's allergies indicates:  No Known Allergies     Medication List          Accurate as of May 24, 2022  2:43 PM. If you have any questions, ask your nurse or doctor.            CONTINUE taking these medications    albuterol 90 mcg/actuation inhaler  Commonly known as: VENTOLIN HFA  Inhale 2 puffs into the lungs every 6 (six) hours as needed for Wheezing. Rescue     amLODIPine 10 MG tablet  Commonly known as: NORVASC  Take 1 tablet (10 mg total) by mouth once daily.     aspirin 81 MG Chew  Take 1 tablet (81 mg total) by mouth once daily.     atorvastatin 80 MG tablet  Commonly known as: LIPITOR  Take 1 tablet (80 mg total) by mouth once daily.     carvediloL 6.25 MG tablet  Commonly known as: COREG  Take 1 tablet (6.25 mg total) by mouth 2 (two) times daily.     clopidogreL 75 mg tablet  Commonly known as: PLAVIX  Take 1 tablet (75 mg total) by mouth once daily.     furosemide 40 MG tablet  Commonly known as: LASIX  Take 1 tablet (40 mg total) by mouth 2 (two) times a day.     levoFLOXacin 750 MG tablet  Commonly known as: LEVAQUIN  Take 1 tablet (750 mg total) by mouth once daily. for 4 days     losartan  100 MG tablet  Commonly known as: COZAAR  Take 1 tablet (100 mg total) by mouth once daily.     predniSONE 20 MG tablet  Commonly known as: DELTASONE  Take 2 tablets (40 mg total) by mouth once daily. for 3 days     VITAMIN C ENERGY BOOSTER 1,000 mg Pwep  Generic drug: ascorbic acid-multivit-min            SOCIAL HISTORY:     Social History     Socioeconomic History    Marital status:    Tobacco Use    Smoking status: Former Smoker     Packs/day: 1.00     Years: 25.00     Pack years: 25.00     Types: Cigarettes     Quit date:      Years since quittin.3    Smokeless tobacco: Never Used   Substance and Sexual Activity    Alcohol use: Yes     Comment: ocassionally    Drug use: Never    Sexual activity: Yes     Partners: Female     Social Determinants of Health     Financial Resource Strain: Medium Risk    Difficulty of Paying Living Expenses: Somewhat hard   Food Insecurity: No Food Insecurity    Worried About Running Out of Food in the Last Year: Never true    Ran Out of Food in the Last Year: Never true   Transportation Needs: Unmet Transportation Needs    Lack of Transportation (Medical): Yes    Lack of Transportation (Non-Medical): Yes   Physical Activity: Insufficiently Active    Days of Exercise per Week: 3 days    Minutes of Exercise per Session: 10 min   Stress: Stress Concern Present    Feeling of Stress : To some extent   Social Connections: Unknown    Frequency of Communication with Friends and Family: Once a week    Frequency of Social Gatherings with Friends and Family: Never    Active Member of Clubs or Organizations: No    Attends Club or Organization Meetings: Never    Marital Status:    Housing Stability: High Risk    Unable to Pay for Housing in the Last Year: Yes    Number of Places Lived in the Last Year: 1    Unstable Housing in the Last Year: No       FAMILY HISTORY:     Family History   Problem Relation Age of Onset    Diabetes Mother     Heart defect  "Mother     Cancer Father     Cancer Sister     No Known Problems Son        REVIEW OF SYSTEMS:   Review of Systems   Constitutional: Negative.   HENT: Negative.    Eyes: Negative.    Cardiovascular: Negative.    Respiratory: Negative.    Endocrine: Negative.    Hematologic/Lymphatic: Negative.    Skin: Negative.    Musculoskeletal: Negative.    Gastrointestinal: Negative.    Genitourinary: Negative.    Neurological: Negative.    Psychiatric/Behavioral: Negative.    Allergic/Immunologic: Negative.        A 10 point review of systems was performed and all the pertinent positives have been mentioned. Rest of review of systems was negative.        PHYSICAL EXAM:     Vitals:    05/24/22 1435   BP: 131/68   Pulse: 96   Resp: 18    Body mass index is 29.02 kg/m².  Weight: 91.7 kg (202 lb 4.4 oz)   Height: 5' 10" (177.8 cm)     Physical Exam  Vitals reviewed.   Constitutional:       Appearance: He is well-developed.   HENT:      Head: Normocephalic.   Eyes:      Conjunctiva/sclera: Conjunctivae normal.      Pupils: Pupils are equal, round, and reactive to light.   Cardiovascular:      Rate and Rhythm: Normal rate and regular rhythm.      Heart sounds: Normal heart sounds.   Pulmonary:      Effort: Pulmonary effort is normal.      Breath sounds: Normal breath sounds.   Abdominal:      General: Bowel sounds are normal.      Palpations: Abdomen is soft.   Musculoskeletal:      Cervical back: Normal range of motion and neck supple.   Skin:     General: Skin is warm.   Neurological:      Mental Status: He is alert and oriented to person, place, and time.           DATA:     Laboratory:  CBC:  Recent Labs   Lab 05/19/22  0416 05/20/22  0326 05/21/22  0423   WBC 25.05 H 19.87 H 19.08 H   Hemoglobin 11.7 L 12.1 L 12.5 L   Hematocrit 34.8 L 37.1 L 38.3 L   Platelets 245 303 293       CHEMISTRIES:  Recent Labs   Lab 05/19/22  0416 05/20/22  0326 05/21/22  0423   Glucose 127 H 94 111 H   Sodium 139 145 144   Potassium 3.2 L 3.3 L " 2.7 LL   BUN 16 21 23   Creatinine 1.0 1.0 1.0   eGFR if African American >60 >60 >60   eGFR if non African American >60 >60 >60   Calcium 8.8 8.8 8.7   Magnesium 1.8 1.9 1.7       CARDIAC BIOMARKERS:  Recent Labs   Lab 02/24/22  1045 05/17/22  1428 05/17/22  1836   Troponin I 0.610 H 0.016 0.043 H       COAGS:  Recent Labs   Lab 12/23/21  1030 02/22/22  1452 03/04/22  0424   INR 1.0 1.2 1.1       LIPIDS/LFTS:  Recent Labs   Lab 09/09/20  0956 09/28/20  0722 08/17/21  0733 08/24/21  0846 02/23/22  0407 02/23/22  1258 05/03/22  1255 05/17/22  1428 05/18/22  0316   Cholesterol 183  --  186  --  125  --   --   --   --    Triglycerides 87  --  172 H  --  224 H  --   --   --   --    HDL 33 L  --  38 L  --  26 L  --   --   --   --    LDL Cholesterol 132.6  --  113.6  --  54.2 L  --   --   --   --    Non-HDL Cholesterol 150  --  148  --  99  --   --   --   --    AST 18   < > 20   < > 29   < > 26 42 H 31   ALT 24   < > 26   < > 19   < > 23 22 21    < > = values in this interval not displayed.       Hemoglobin A1C   Date Value Ref Range Status   02/23/2022 5.0 4.0 - 5.6 % Final     Comment:     ADA Screening Guidelines:  5.7-6.4%  Consistent with prediabetes  >or=6.5%  Consistent with diabetes    High levels of fetal hemoglobin interfere with the HbA1C  assay. Heterozygous hemoglobin variants (HbS, HgC, etc)do  not significantly interfere with this assay.   However, presence of multiple variants may affect accuracy.     08/17/2021 6.0 (H) 4.0 - 5.6 % Final     Comment:     ADA Screening Guidelines:  5.7-6.4%  Consistent with prediabetes  >or=6.5%  Consistent with diabetes    High levels of fetal hemoglobin interfere with the HbA1C  assay. Heterozygous hemoglobin variants (HbS, HgC, etc)do  not significantly interfere with this assay.   However, presence of multiple variants may affect accuracy.     09/04/2019 5.6 4.0 - 5.6 % Final     Comment:     ADA Screening Guidelines:  5.7-6.4%  Consistent with prediabetes  >or=6.5%   Consistent with diabetes  High levels of fetal hemoglobin interfere with the HbA1C  assay. Heterozygous hemoglobin variants (HbS, HgC, etc)do  not significantly interfere with this assay.   However, presence of multiple variants may affect accuracy.         TSH  Recent Labs   Lab 11/05/20  1107 01/20/21  1148 02/22/22  1452   TSH 0.575 0.888 1.310       The ASCVD Risk score (Jennie TINAJERO Jr., et al., 2013) failed to calculate for the following reasons:    The patient has a prior MI or stroke diagnosis             ASSESSMENT AND PLAN     Patient Active Problem List   Diagnosis    Former smoker    Multiple pulmonary nodules    Macrocytic anemia    History of completed stroke    Poor dentition    Lung cancer, main bronchus, left    Secondary malignant neoplasm of bone    Phlebitis after infusion    Immunodeficiency due to drugs    Atherosclerosis of aorta    Bronchiectasis without complication    Essential hypertension    History of non-ST elevation myocardial infarction (NSTEMI)    Palliative care encounter    Multifocal atrial tachycardia    Chronic combined systolic and diastolic heart failure    PAD (peripheral artery disease)    Coronary artery disease involving native coronary artery of native heart without angina pectoris    Goals of care, counseling/discussion         Nonischemic cardiomyopathy:  EF 35-40%.  Catheterization showed nonobstructive CAD.  Currently euvolemic.  Continue long-acting beta blockers and losartan.    Hypertension:  Now well controlled    Nonobstructive CAD:  Aggressive risk factor modification    Abnormal ABIs.  Check peripheral ultrasound.  Peripheral ultrasound revealed greater than 50% left common iliac stenosis, left SFA disease, chronic total occlusion of right SFA.  Initiate P Theresa.  Follow-up in 1 month.  Consider revascularization if patient continues to lifestyle limiting claudication despite maximum medical management and walking therapy.    · Right resting LUIS FELIPE  0.29, is suggestive of severe right lower extremity arterial disease sufficient to cause rest pain.  · Left resting LUIS FELIPE 0.92, is suggestive of minimal left lower extremity arterial disease.  · PVR waveforms are abnormal at the calf and ankle level on the right.        Follow-up in 1 month        Thank you very much for involving me in the care of your patient.  Please do not hesitate to contact me if there are any questions.      Catia Roman MD, FAC, [facility]  Interventional Cardiologist, [facility].           This note was dictated with the help of speech recognition software.  There might be un-intended errors and/or substitutions.

## 2022-05-24 NOTE — TELEPHONE ENCOUNTER
----- Message from Niranjan Delarosa sent at 5/24/2022 11:13 AM CDT -----  Regarding: Call BAck  Name of Who is Calling: Salud Bolivar on behalf of RAMA PANIAGUA [[id number]]              What is the request in detail: Salud Bolivar requesting order for patient. Please assist              Can the clinic reply by [facility]: No              What Number to Call Back if not in [facility]:Salud Fax  [phone]  . [phone]

## 2022-05-24 NOTE — TELEPHONE ENCOUNTER
Return call to [facility], and she was stating that she needs a order for a  consult. V/O per  for a  consult. She read back and confirmed. Fax number to office given to have paperwork sent for provider to sign.

## 2022-05-24 NOTE — TELEPHONE ENCOUNTER
----- Message from Niranjan Delarosa sent at 5/24/2022 11:13 AM CDT -----  Regarding: Call BAck  Name of Who is Calling: Salud Bolivar on behalf of RAMA PANIAGUA [[id number]]              What is the request in detail: Salud Bolivar requesting order for patient. Please assist              Can the clinic reply by [facility]: No              What Number to Call Back if not in [facility]:Salud Fax  [phone]  . [phone]

## 2022-05-26 ENCOUNTER — HOSPITAL ENCOUNTER (OUTPATIENT)
Dept: RADIOLOGY | Facility: HOSPITAL | Age: 62
Discharge: HOME | End: 2022-05-26
Attending: STUDENT IN AN ORGANIZED HEALTH CARE EDUCATION/TRAINING PROGRAM
Payer: MEDICARE

## 2022-05-26 DIAGNOSIS — C34.02 LUNG CANCER, MAIN BRONCHUS, LEFT: ICD-10-CM

## 2022-05-26 DIAGNOSIS — C79.51 SECONDARY MALIGNANT NEOPLASM OF BONE: ICD-10-CM

## 2022-05-26 PROCEDURE — A9698 NON-RAD CONTRAST MATERIALNOC: HCPCS | Performed by: STUDENT IN AN ORGANIZED HEALTH CARE EDUCATION/TRAINING PROGRAM

## 2022-05-26 PROCEDURE — 71250 CT CHEST ABDOMEN PELVIS WITHOUT CONTRAST(XPD): ICD-10-PCS | Mod: 26,,, | Performed by: INTERNAL MEDICINE

## 2022-05-26 PROCEDURE — 71250 CT THORAX DX C-: CPT | Mod: 26,,, | Performed by: INTERNAL MEDICINE

## 2022-05-26 PROCEDURE — 25500020 PHARM REV CODE 255: Performed by: STUDENT IN AN ORGANIZED HEALTH CARE EDUCATION/TRAINING PROGRAM

## 2022-05-26 PROCEDURE — 74176 CT ABD & PELVIS W/O CONTRAST: CPT | Mod: TC

## 2022-05-26 PROCEDURE — 74176 CT ABD & PELVIS W/O CONTRAST: CPT | Mod: 26,,, | Performed by: INTERNAL MEDICINE

## 2022-05-26 PROCEDURE — 74176 CT CHEST ABDOMEN PELVIS WITHOUT CONTRAST(XPD): ICD-10-PCS | Mod: 26,,, | Performed by: INTERNAL MEDICINE

## 2022-05-26 RX ADMIN — BARIUM SULFATE 450 ML: 20 SUSPENSION ORAL at 09:05

## 2022-05-27 ENCOUNTER — OFFICE VISIT (OUTPATIENT)
Dept: HEMATOLOGY/ONCOLOGY | Facility: CLINIC | Age: 62
End: 2022-05-27
Payer: MEDICARE

## 2022-05-27 VITALS
WEIGHT: 209 LBS | HEART RATE: 104 BPM | HEIGHT: 70 IN | SYSTOLIC BLOOD PRESSURE: 141 MMHG | TEMPERATURE: 99 F | DIASTOLIC BLOOD PRESSURE: 82 MMHG | OXYGEN SATURATION: 96 % | BODY MASS INDEX: 29.92 KG/M2

## 2022-05-27 DIAGNOSIS — C34.02 LUNG CANCER, MAIN BRONCHUS, LEFT: ICD-10-CM

## 2022-05-27 DIAGNOSIS — D84.821 IMMUNODEFICIENCY DUE TO DRUGS: ICD-10-CM

## 2022-05-27 DIAGNOSIS — I73.9 PAD (PERIPHERAL ARTERY DISEASE): ICD-10-CM

## 2022-05-27 DIAGNOSIS — I50.42 CHRONIC COMBINED SYSTOLIC AND DIASTOLIC HEART FAILURE: Primary | ICD-10-CM

## 2022-05-27 DIAGNOSIS — Z79.899 IMMUNODEFICIENCY DUE TO DRUGS: ICD-10-CM

## 2022-05-27 DIAGNOSIS — C79.51 SECONDARY MALIGNANT NEOPLASM OF BONE: ICD-10-CM

## 2022-05-27 PROCEDURE — 99213 OFFICE O/P EST LOW 20 MIN: CPT | Mod: PBBFAC | Performed by: STUDENT IN AN ORGANIZED HEALTH CARE EDUCATION/TRAINING PROGRAM

## 2022-05-27 PROCEDURE — 99215 PR OFFICE/OUTPT VISIT, EST, LEVL V, 40-54 MIN: ICD-10-PCS | Mod: S$PBB,,, | Performed by: STUDENT IN AN ORGANIZED HEALTH CARE EDUCATION/TRAINING PROGRAM

## 2022-05-27 PROCEDURE — 99215 OFFICE O/P EST HI 40 MIN: CPT | Mod: S$PBB,,, | Performed by: STUDENT IN AN ORGANIZED HEALTH CARE EDUCATION/TRAINING PROGRAM

## 2022-05-27 PROCEDURE — 99999 PR PBB SHADOW E&M-EST. PATIENT-LVL III: CPT | Mod: PBBFAC,,, | Performed by: STUDENT IN AN ORGANIZED HEALTH CARE EDUCATION/TRAINING PROGRAM

## 2022-05-27 PROCEDURE — 99999 PR PBB SHADOW E&M-EST. PATIENT-LVL III: ICD-10-PCS | Mod: PBBFAC,,, | Performed by: STUDENT IN AN ORGANIZED HEALTH CARE EDUCATION/TRAINING PROGRAM

## 2022-05-27 NOTE — ASSESSMENT & PLAN NOTE
5/26/22 restaging scans show slight interval enlargement of known target lesion.  However, patient has missed several doses of chemo between 2/2022 and now so unlikely true treatment failure.  -will repeat labs next week prior to chemo; if labs are ok, ok to proceed with treatment  -continue with q2w labs and follow up in 1 month.

## 2022-05-27 NOTE — PROGRESS NOTES
"  PATIENT: Kashif Iversno  MRN: [id number]  DATE: 5/27/2022      Diagnosis:   1. Chronic combined systolic and diastolic heart failure    2. PAD (peripheral artery disease)    3. Immunodeficiency due to drugs    4. Lung cancer, main bronchus, left    5. Secondary malignant neoplasm of bone        Chief Complaint: Lung Cancer      Oncologic History:    Oncologic History 1. 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 Palliative chemoradiation with carboplatin and paclitaxel; anaphylactic reaction to paclitaxel  2. Pembrolizumab  3. Pembrolizumab+ramucirumab (LungMAP trial)  4. Gemcitabine    Pathology Squamous cell carcinoma, no actionable mutations, PD-L1 5%        Subjective:    Interval History: Mr. Iverson returns for follow up.     8/30/2019 underwent bronchoscopy that showed "A partially obstructing (about 80% obstructed) mass was found in the middle portion in the left mainstem bronchus. The mass was large and bloody, circumferential, endobronchial, friable and necrotic. 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  No actionable mutations and PD-L1 5%.  9/19/21 staging PET CT showed "Hypermetabolic left lung mass concerning for primary pulmonary malignancy with metastatic disease involving the right 6th and 9th ribs as well as mediastinal and left supraclavicular lymph nodes."  Stage IV squamous cell carcinoma of the lung at diagnosis.    10/8/2019-10/21/2019 he received palliative chemoradiation for rib pain with carboplatin and paclitaxel.  He received 3 cycles of carboplatin and paclitaxel.  He developed anaphylactic reaction to paclitaxel.  The chest was treated with AP:PA fields and the right 9th rib was treated with anterior and posterior oblique fields at 300 cGy per fraction to 3000 cGy.   Lt chest 6X 300 10 / 10 3,000   Rt 9th rib 6X 300 10 / 10 3,000     10/31/2019-9/10/2020 he received " pembrolizumab (completed 15 cycles, last dose 8/21/2020)  9/10/2020 PET CT showed progression of disease.  He was then referred to Dr. Key for evaluation for clinical trials.  10/20/2020 he underwent repeat biopsy for LUNG MAP trial and was randomized to Ramucirumab + pembrolizumab.    11/17/2020 started ramucirumab+pembrolizumab.  Completed 4 cycles and then 2/10/2021 scans showed progression.    2/24/2021 he was subsequent started on gemcitabine with Dr. Cruz.  His care was transferred to Dr. Romo who continued his current regimen and now is currently under my care.   --  5/17/22-5/22/22 admitted for acute respiratory failure secondary to heart failure exacerbation requiring intubation.  He was extubated and diuresed successfully.  Here is here for discharge follow up evaluation and also to be assessed prior to chemo next week.  He feels he is back at baseline. He does have RLE claudication but his wife says he has been very active and appears to be at his baseline.  He hasn't tried mowing his lawn yet, which he stopped because of the RLE claudication.    No new symptoms to report otherwise.    Wife accompanies him at this visit.    Past Medical History:   Past Medical History:   Diagnosis Date    Combined systolic and diastolic heart failure     Hypertension     Lung cancer     NSCLC    Lung mass     left lung       Past Surgical HIstory:   Past Surgical History:   Procedure Laterality Date    BRONCHOSCOPY N/A 8/30/2019    Procedure: Bronchoscopy;  Surgeon: Miguel Diagnostic Provider;  Location: [address];  Service: Anesthesiology;  Laterality: N/A;    CORONARY ANGIOGRAPHY N/A 3/4/2022    Procedure: ANGIOGRAM, CORONARY ARTERY;  Surgeon: Robson Green MD;  Location: [facility] CATH LAB;  Service: Cardiology;  Laterality: N/A;       Family History:   Family History   Problem Relation Age of Onset    Diabetes Mother     Heart defect Mother     Cancer Father     Cancer Sister     No Known Problems  Son        Social History:  reports that he quit smoking about 2 years ago. His smoking use included cigarettes. He has a 25.00 pack-year smoking history. He has never used smokeless tobacco. He reports current alcohol use. He reports that he does not use drugs.    Allergies:  Review of patient's allergies indicates:  No Known Allergies    Medications:  Current Outpatient Medications   Medication Sig Dispense Refill    albuterol (VENTOLIN HFA) 90 mcg/actuation inhaler Inhale 2 puffs into the lungs every 6 (six) hours as needed for Wheezing. Rescue 18 g 11    amLODIPine (NORVASC) 10 MG tablet Take 1 tablet (10 mg total) by mouth once daily. 30 tablet 11    ascorbic acid-multivit-min (VITAMIN C ENERGY BOOSTER) 1,000 mg PwEP Take by mouth. Patient takes 3,000 mg per day      aspirin 81 MG Chew Take 1 tablet (81 mg total) by mouth once daily. 30 tablet 11    atorvastatin (LIPITOR) 80 MG tablet Take 1 tablet (80 mg total) by mouth once daily. 90 tablet 3    carvediloL (COREG) 6.25 MG tablet Take 1 tablet (6.25 mg total) by mouth 2 (two) times daily. 60 tablet 11    cilostazoL (PLETAL) 100 MG Tab Take 1 tablet (100 mg total) by mouth 2 (two) times daily. 60 tablet 11    clopidogreL (PLAVIX) 75 mg tablet Take 1 tablet (75 mg total) by mouth once daily. 30 tablet 11    furosemide (LASIX) 40 MG tablet Take 1 tablet (40 mg total) by mouth 2 (two) times a day. 60 tablet 11    losartan (COZAAR) 100 MG tablet Take 1 tablet (100 mg total) by mouth once daily. 90 tablet 3     No current facility-administered medications for this visit.       Review of Systems   Constitutional: Negative for activity change, appetite change, chills, diaphoresis, fatigue, fever and unexpected weight change.   HENT: Negative for nosebleeds and trouble swallowing.    Eyes: Negative for visual disturbance.   Respiratory: Negative for cough, chest tightness, shortness of breath and wheezing.    Cardiovascular: Negative for chest pain and leg  "swelling.   Gastrointestinal: Negative for abdominal distention, abdominal pain, blood in stool, constipation, diarrhea, nausea and vomiting.   Endocrine: Negative for cold intolerance and heat intolerance.   Genitourinary: Negative for difficulty urinating and dysuria.   Musculoskeletal: Negative for arthralgias, back pain and myalgias.   Skin: Negative for color change.   Neurological: Negative for dizziness, weakness, light-headedness, numbness and headaches.   Hematological: Negative for adenopathy. Bruises/bleeds easily.   Psychiatric/Behavioral: Negative for confusion.       ECOG Performance Status:     ECOG SCORE    0 - Fully active-able to carry on all pre-disease performance without restriction         Objective:      Vitals:   Vitals:    05/27/22 1412   BP: (!) 141/82   BP Location: Left arm   Patient Position: Sitting   Pulse: 104   Temp: 98.7 °F (37.1 °C)   TempSrc: Oral   SpO2: 96%   Weight: 94.8 kg (208 lb 15.9 oz)   Height: 5' 10" (1.778 m)     BMI: Body mass index is 29.99 kg/m².    Physical Exam  Constitutional:       General: He is not in acute distress.     Appearance: Normal appearance. He is not ill-appearing.   HENT:      Head: Normocephalic and atraumatic.      Nose: Nose normal.      Mouth/Throat:      Pharynx: No oropharyngeal exudate or posterior oropharyngeal erythema.   Eyes:      General: No scleral icterus.     Extraocular Movements: Extraocular movements intact.      Conjunctiva/sclera: Conjunctivae normal.      Pupils: Pupils are equal, round, and reactive to light.   Cardiovascular:      Rate and Rhythm: Normal rate and regular rhythm.      Heart sounds: No murmur heard.    No friction rub. No gallop.      Comments: Right chest wall port c/d/i  Pulmonary:      Effort: Pulmonary effort is normal. No respiratory distress.      Breath sounds: No stridor. No wheezing, rhonchi or rales.   Abdominal:      General: Bowel sounds are normal. There is no distension.      Palpations: Abdomen is " soft. There is no mass.      Tenderness: There is no abdominal tenderness. There is no guarding or rebound.   Musculoskeletal:         General: No swelling or tenderness. Normal range of motion.      Cervical back: Normal range of motion and neck supple.      Right lower leg: No edema.      Left lower leg: No edema.   Skin:     General: Skin is warm and dry.      Findings: Bruising present.   Neurological:      General: No focal deficit present.      Mental Status: He is alert.         Laboratory Data:   Recent Results (from the past 168 hour(s))   CBC Auto Differential    Collection Time: 05/21/22  4:23 AM   Result Value Ref Range    WBC 19.08 (H) 3.90 - 12.70 K/uL    RBC 3.93 (L) 4.60 - 6.20 M/uL    Hemoglobin 12.5 (L) 14.0 - 18.0 g/dL    Hematocrit 38.3 (L) 40.0 - 54.0 %    MCV 98 82 - 98 fL    MCH 31.8 (H) 27.0 - 31.0 pg    MCHC 32.6 32.0 - 36.0 g/dL    RDW 17.4 (H) 11.5 - 14.5 %    Platelets 293 150 - 450 K/uL    MPV 10.3 9.2 - 12.9 fL    Immature Granulocytes 0.4 0.0 - 0.5 %    Gran # (ANC) 14.2 (H) 1.8 - 7.7 K/uL    Immature Grans (Abs) 0.08 (H) 0.00 - 0.04 K/uL    Lymph # 2.5 1.0 - 4.8 K/uL    Mono # 2.2 (H) 0.3 - 1.0 K/uL    Eos # 0.0 0.0 - 0.5 K/uL    Baso # 0.02 0.00 - 0.20 K/uL    nRBC 0 0 /100 WBC    Gran % 74.6 (H) 38.0 - 73.0 %    Lymph % 13.3 (L) 18.0 - 48.0 %    Mono % 11.5 4.0 - 15.0 %    Eosinophil % 0.1 0.0 - 8.0 %    Basophil % 0.1 0.0 - 1.9 %    Differential Method Automated    Magnesium    Collection Time: 05/21/22  4:23 AM   Result Value Ref Range    Magnesium 1.7 1.6 - 2.6 mg/dL   Phosphorus    Collection Time: 05/21/22  4:23 AM   Result Value Ref Range    Phosphorus 4.2 2.7 - 4.5 mg/dL   Basic Metabolic Panel    Collection Time: 05/21/22  4:23 AM   Result Value Ref Range    Sodium 144 136 - 145 mmol/L    Potassium 2.7 (LL) 3.5 - 5.1 mmol/L    Chloride 103 95 - 110 mmol/L    CO2 31 (H) 23 - 29 mmol/L    Glucose 111 (H) 70 - 110 mg/dL    BUN 23 8 - 23 mg/dL    Creatinine 1.0 0.5 - 1.4 mg/dL     Calcium 8.7 8.7 - 10.5 mg/dL    Anion Gap 10 8 - 16 mmol/L    eGFR if African American >60 >60 mL/min/1.73 m^2    eGFR if non African American >60 >60 mL/min/1.73 m^2      [facility] Reference Range & Units 03/04/22 04:24   WBC 3.90 - 12.70 K/uL 9.65   RBC 4.60 - 6.20 M/uL 3.72 (L)   Hemoglobin 14.0 - 18.0 g/dL 11.8 (L)   Hematocrit 40.0 - 54.0 % 37.1 (L)   MCV 82 - 98 fL 100 (H)   MCH 27.0 - 31.0 pg 31.7 (H)   MCHC 32.0 - 36.0 g/dL 31.8 (L)   RDW 11.5 - 14.5 % 16.0 (H)   Platelets 150 - 450 K/uL 329   MPV 9.2 - 12.9 fL 10.3   Protime 9.0 - 12.5 sec 11.7   INR 0.8 - 1.2  1.1 [1]   APTT 21.0 - 32.0 sec 30.2 [2]   Sodium 136 - 145 mmol/L 139   Potassium 3.5 - 5.1 mmol/L 3.5   Chloride 95 - 110 mmol/L 103   CO2 23 - 29 mmol/L 27   Anion Gap 8 - 16 mmol/L 9   BUN 8 - 23 mg/dL 22   Creatinine 0.5 - 1.4 mg/dL 1.0   EGFR if non African American >60 mL/min/1.73 m^2 >60 [3]   EGFR if African American >60 mL/min/1.73 m^2 >60   Glucose 70 - 110 mg/dL 103   Calcium 8.7 - 10.5 mg/dL 8.5 (L)       Imaging:     X-Ray Chest 1 View    Result Date: 5/19/2022  EXAMINATION: XR CHEST 1 VIEW CLINICAL HISTORY: Pleural effusion; TECHNIQUE: Single frontal view of the chest was performed. COMPARISON: 05/18/2022 FINDINGS: Right central line tip stable.  There is perihilar and bibasilar lung opacities with bilateral pleural effusions, slightly increased on the right side since the prior exam.  No gross pneumothorax.  Enlarged cardiac silhouette, stable.     Slight increased right pleural effusion. Electronically signed by: Sergio Roberts MD Date:    05/19/2022 Time:    08:06    X-Ray Chest 1 View    Result Date: 5/17/2022  EXAMINATION: XR CHEST 1 VIEW CLINICAL HISTORY: Chest Pain; TECHNIQUE: Single frontal view of the chest was performed. COMPARISON: 03/01/2022 FINDINGS: Port catheter on the right. Suboptimal inspiration limits characterization. Bibasilar hazy density may be associated with layering effusion, edema, ground-glass infiltrate and or  atelectasis. Stable left upper lobe interstitial and ground-glass changes.  Possible left upper lobe suprahilar mass.     Suboptimal inspiration with interval increased density at the lung bases could be associated with infiltrate/pneumonitis, edema and or layering effusions. Stable left upper lobe appearance.  See above comments.  Follow-up recommended. Electronically signed by: Renato Lewis Date:    05/17/2022 Time:    15:24    CTA Chest Non-Coronary (PE Study)    Result Date: 5/17/2022  EXAMINATION: CT ABDOMEN PELVIS WITH CONTRAST; CTA CHEST NON CORONARY CLINICAL HISTORY: Small cell lung cancer (SCLC), assess treatment response;; Pulmonary embolism (PE) suspected, unknown D-dimer; .  Emergency CT TECHNIQUE: Low dose axial images, sagittal and coronal reformations were obtained from the lung bases to the pubic symphysis following the IV administration of 100 mL of Omnipaque 350 Please note a separate study a CTA of the chest is included with the CT abdomen and pelvis study COMPARISON: 02/22/2022, 01/05/2022 FINDINGS: Abdomen: - - Liver: No focal mass. - Gallbladder: No calcified gallstones. - Bile Ducts: No evidence of intra or extra hepatic biliary ductal dilation. - Spleen: Negative. - Kidneys: Kidneys appear normal size.  Kidneys enhance normally.  No stones bilaterally.  No hydronephrosis or obstruction bilaterally.  Mild perinephric and periureteral stranding bilaterally right greater than left.  Minimal wall thickening of the renal pelvis on the right.  Mild inflammatory or infectious process is not excluded.  Follow-up recommended. - Adrenals: Unremarkable. - Pancreas: No mass or peripancreatic fat stranding. - Retroperitoneum:  No significant adenopathy. - Vascular: Mild atherosclerotic change and ectasia of the distal abdominal aorta.  Maximum diameter is 2.7 cm. - Abdominal wall:  Unremarkable. Pelvis: No pelvic mass, adenopathy, or free fluid. Bowel/Mesentery: No evidence of bowel obstruction.   Nondistention of the descending and sigmoid colon.  Minimal wall thickening is not excluded.  Retained feces in the proximal colon. No evidence of appendicitis. Facet arthropathy at L4-5 on the left. Bones:  No acute osseous abnormality and no suspicious lytic or blastic lesion. Chest: Pulmonary arteries are suboptimally enhanced.  No filling defects are identified to indicate pulmonary embolism.  Limited visualization of subsegmental pulmonary arteries bilaterally. Moderate pleural effusion on the right and mild pleural effusion on the left posteriorly.  Right lower lobe atelectasis. Mild anterior left upper lobe complex fluid, thickening or low-density mass axial 181 of series 2, similar to the prior study.  Mild left upper lobe atelectasis.  Small 1.8 cm left upper lobe nodular focus on axial 133. Superior left lower lobe posteromedial focal airspace disease and mild consolidation on axial 157, coronal 164 of series 601 and sagittal 334 series 602.  This could represent neoplasm or infection.  Follow-up recommended. Mild ground-glass changes bilaterally may be associated with mild edema pneumonitis.     1. No evidence of pulmonary embolism.  Limited visualization.  See above comments. 2. Moderate pleural effusion on the right and mild pleural effusion on the left.  There is associated bilateral atelectasis and mild ground-glass infiltrate or edema.  Airspace disease in left upper lobe and superior left lower lobe with slight nodular components.  This could represent neoplasm..  Scarring, pneumonitis and or post treatment changes not excluded.  See above comments.  Follow-up recommended. 3. Mild perinephric stranding right greater than left with minimal wall thickening of the renal pelvis on the right.  Mild inflammatory or infectious process is not excluded. 4. Nondistention of the descending and sigmoid colon.  Minimal wall thickening is not excluded. 5.  This report was flagged in Epic as abnormal.  Electronically signed by: Renato Lewis Date:    05/17/2022 Time:    16:53    CT Abdomen Pelvis With Contrast    Result Date: 5/17/2022  EXAMINATION: CT ABDOMEN PELVIS WITH CONTRAST; CTA CHEST NON CORONARY CLINICAL HISTORY: Small cell lung cancer (SCLC), assess treatment response;; Pulmonary embolism (PE) suspected, unknown D-dimer; .  Emergency CT TECHNIQUE: Low dose axial images, sagittal and coronal reformations were obtained from the lung bases to the pubic symphysis following the IV administration of 100 mL of Omnipaque 350 Please note a separate study a CTA of the chest is included with the CT abdomen and pelvis study COMPARISON: 02/22/2022, 01/05/2022 FINDINGS: Abdomen: - - Liver: No focal mass. - Gallbladder: No calcified gallstones. - Bile Ducts: No evidence of intra or extra hepatic biliary ductal dilation. - Spleen: Negative. - Kidneys: Kidneys appear normal size.  Kidneys enhance normally.  No stones bilaterally.  No hydronephrosis or obstruction bilaterally.  Mild perinephric and periureteral stranding bilaterally right greater than left.  Minimal wall thickening of the renal pelvis on the right.  Mild inflammatory or infectious process is not excluded.  Follow-up recommended. - Adrenals: Unremarkable. - Pancreas: No mass or peripancreatic fat stranding. - Retroperitoneum:  No significant adenopathy. - Vascular: Mild atherosclerotic change and ectasia of the distal abdominal aorta.  Maximum diameter is 2.7 cm. - Abdominal wall:  Unremarkable. Pelvis: No pelvic mass, adenopathy, or free fluid. Bowel/Mesentery: No evidence of bowel obstruction.  Nondistention of the descending and sigmoid colon.  Minimal wall thickening is not excluded.  Retained feces in the proximal colon. No evidence of appendicitis. Facet arthropathy at L4-5 on the left. Bones:  No acute osseous abnormality and no suspicious lytic or blastic lesion. Chest: Pulmonary arteries are suboptimally enhanced.  No filling defects are  identified to indicate pulmonary embolism.  Limited visualization of subsegmental pulmonary arteries bilaterally. Moderate pleural effusion on the right and mild pleural effusion on the left posteriorly.  Right lower lobe atelectasis. Mild anterior left upper lobe complex fluid, thickening or low-density mass axial 181 of series 2, similar to the prior study.  Mild left upper lobe atelectasis.  Small 1.8 cm left upper lobe nodular focus on axial 133. Superior left lower lobe posteromedial focal airspace disease and mild consolidation on axial 157, coronal 164 of series 601 and sagittal 334 series 602.  This could represent neoplasm or infection.  Follow-up recommended. Mild ground-glass changes bilaterally may be associated with mild edema pneumonitis.     1. No evidence of pulmonary embolism.  Limited visualization.  See above comments. 2. Moderate pleural effusion on the right and mild pleural effusion on the left.  There is associated bilateral atelectasis and mild ground-glass infiltrate or edema.  Airspace disease in left upper lobe and superior left lower lobe with slight nodular components.  This could represent neoplasm..  Scarring, pneumonitis and or post treatment changes not excluded.  See above comments.  Follow-up recommended. 3. Mild perinephric stranding right greater than left with minimal wall thickening of the renal pelvis on the right.  Mild inflammatory or infectious process is not excluded. 4. Nondistention of the descending and sigmoid colon.  Minimal wall thickening is not excluded. 5.  This report was flagged in Epic as abnormal. Electronically signed by: Renato Lewis Date:    05/17/2022 Time:    16:53    X-Ray Abdomen Portable    Result Date: 5/17/2022  EXAMINATION: XR ABDOMEN PORTABLE CLINICAL HISTORY: Encounter for orogastric (OG) tube placement; Encounter for fitting and adjustment of other gastrointestinal appliance and device TECHNIQUE: AP View(s) of the abdomen was performed.  COMPARISON: CT abdomen and pelvis from the same date. FINDINGS: Enteric tube extends well below the diaphragm into the stomach.  There is prominent gaseous distention seen of the stomach, increased from earlier CT.     As above. Electronically signed by: Abelino Finley MD Date:    05/17/2022 Time:    17:13    X-Ray Chest AP Portable    Result Date: 5/18/2022  EXAMINATION: XR CHEST AP PORTABLE CLINICAL HISTORY: SHORTNESS OF BREATH; TECHNIQUE: Single frontal view of the chest was performed. COMPARISON: 05/17/2022. FINDINGS: There is a right sided port terminating in the low SVC. The lungs are hypoexpanded.  There are continued bilateral interstitial and airspace opacities, similar to prior.  There are small pleural effusions.  There is no pneumothorax. The cardiac silhouette is partially obscured. The visualized osseous structures are intact.     Bilateral interstitial and airspace opacities and small effusions, not significantly changed from prior. Electronically signed by: Arjun Dejesus Date:    05/18/2022 Time:    20:25    X-Ray Chest AP Portable    Result Date: 5/17/2022  EXAMINATION: XR CHEST AP PORTABLE CLINICAL HISTORY: Hypoxemia TECHNIQUE: Single frontal view of the chest was performed. COMPARISON: 14:58. CTA chest 15:41. FINDINGS: Presumed ET tube is visualized approximately 7 cm above the jonnathan.  No significant change in cardiopulmonary status from earlier chest radiograph and CT.  Bilateral pleural effusions are seen.  Stable area of consolidation is seen in the left mid lung zone with bibasilar atelectasis.  No pneumothorax.     No significant change. Electronically signed by: Abelino Finley MD Date:    05/17/2022 Time:    17:11    Echo    Result Date: 5/18/2022  · The left ventricle is normal in size with concentric hypertrophy and normal systolic function. · The estimated ejection fraction is 65%. · Indeterminate left ventricular diastolic function. · Mild right ventricular enlargement with normal right  ventricular systolic function. · Mild left atrial enlargement. · Mild right atrial enlargement. · Mild tricuspid regurgitation. · Normal central venous pressure (3 mmHg). · The estimated PA systolic pressure is 53 mmHg. · There is pulmonary hypertension.      CV Ultrasound Bilateral Doppler Carotid    Result Date: 4/28/2022  · There is 0-19% right Internal Carotid Stenosis. · There is 0-19% left Internal Carotid Stenosis.      CV Ultrasound doppler arterial legs bilat    Result Date: 4/28/2022  Right leg: >50% PFA stenosis Mid SFA occlusion Moderately decreased LUIS FELIPE, 0.48. Bi/monophasic waveforms. Left leg: >50% CFA stenosis Biphasic waveforms Borderline normal LUIS FELIPE, 0.98.    CT Chest Abdomen Pelvis Without Contrast (XPD)    Result Date: 5/26/2022  EXAMINATION: CT CHEST ABDOMEN PELVIS WITHOUT CONTRAST(XPD) CLINICAL HISTORY: lung cancer on chemotherapy, surveillance scan; Malignant neoplasm of left main bronchus TECHNIQUE: Low dose axial images, sagittal and coronal reformations were obtained from the thoracic inlet to the pubic symphysis following the oral administration of 450ml of Readi-Cat. COMPARISON: Multiple CTs of the chest, abdomen, and pelvis, most recent 05/17/2022 FINDINGS: LINES/TUBES:  Right chest wall Port-A-Cath terminates in the right atrium. SOFT TISSUES: No axillary or subpectoral lymphadenopathy. The visualized thyroid gland is unremarkable. HEART AND MEDIASTINUM: No mediastinal or hilar lymphadenopathy. Heart is normal in size.  Trace pericardial fluid.  Calcifications of the coronary arteries, thoracic aorta, and arch vessels.  The main pulmonary artery is not enlarged. PLEURA: Previous pleural effusions have resolved.  No pneumothorax. LUNGS AND AIRWAYS: Occlusion of the left upper lobe bronchus centrally.  Distally, there is bronchial wall thickening and scattered mucous plugging, for example in the lingula on 4:220. Upper lobe predominant centrilobular emphysema.  There is irregular band like  soft tissue in the left upper lobe abutting the mediastinum and pleura with adjacent pleural thickening (4:176 and 601:84), unchanged in size and configuration.  There is a wedge-shaped opacity in the superior left lower lobe (4:159), also unchanged.  Additional bandlike opacities throughout the left lung, with areas of volume loss, traction bronchiectasis, and architectural distortion. There is a 1.7 x 1.1 cm solid subpleural nodule in the paramediastinal left upper lobe (4:133), unchanged from 05/17/2022 but increased in size from 1.1 x 0.8 cm on 02/22/2022.  Unchanged 0.5 cm solid nodule in the left lower lobe (4:303).  There are numerous micro nodules scattered throughout the lungs bilaterally, for example in the right upper lobe on 4:88; it is difficult to tell if all of these were these were present on prior examinations. HEPATOBILIARY: No focal hepatic lesions. No biliary ductal dilatation. Normal gallbladder. SPLEEN: No splenomegaly. PANCREAS: Punctate calcifications throughout the pancreas, which could be secondary to chronic pancreatitis.  No focal mass or ductal dilatation. ADRENALS: No adrenal nodules. KIDNEYS/URETERS: Punctate nonobstructing stone in the right lower pole.  No hydronephrosis.  No definite solid mass. PELVIC ORGANS/BLADDER: Bladder is unremarkable.  Coarse prostatic calcifications. PERITONEUM / RETROPERITONEUM: No free air or fluid. LYMPH NODES: No lymphadenopathy. VESSELS: Calcifications of the abdominal aorta and its major branches.  Infrarenal abdominal aortic ectasia measuring up to the 2.8 cm. GI TRACT: No distention or wall thickening. Normal appendix. BONES: No acute fractures or focal osseous lesions.  Degenerative changes.     Posttreatment changes in the left upper lobe for lung cancer. Progressively enlarging 1.7 cm nodule in the left upper lobe, concerning for local recurrence or pleural/pulmonary metastasis. No mediastinal or hilar lymphadenopathy. No abdominopelvic  "metastases. Electronically signed by: Jun Ramirez Date:    05/26/2022 Time:    11:38    CV Abdominal Aorta    Result Date: 4/28/2022  Suprarenal aortic ectasia without evidence of AAA, max diam 2.7cm. Aortic atherosclerosis. Increased flow velocity across L MORIAH suggesting >50% stenosis.        Assessment:       1. Chronic combined systolic and diastolic heart failure    2. PAD (peripheral artery disease)    3. Immunodeficiency due to drugs    4. Lung cancer, main bronchus, left    5. Secondary malignant neoplasm of bone           Plan:       Problem List Items Addressed This Visit        Cardiac/Vascular    Chronic combined systolic and diastolic heart failure - Primary    Current Assessment & Plan     Admitted again 5/2022 for acute on chronic systolic and diastolic heart failure requiring intubation.  CT scan with moderate right pleural effusion, which has since resolved since diuresis during his hospital admission  -continue current diuretics and follow up with cardiology.           PAD (peripheral artery disease)    Overview     LUIS FELIPE 3/11/22           Current Assessment & Plan     Follows with cardiology. Recently started on cilostazol. Has intermittent RLE claudication but it is not severe. Has not yet tried mowing his lawn  -told him to try mowing his lawn now that he's on cilostazol. If he can mow his lawn again, anticipate he would likely prefer to stay on cilostazol over getting an invasive procedure.  He will follow up with cardiology in July.              Immunology/Multi System    Immunodeficiency due to drugs    Current Assessment & Plan     Afebrile, ANC sufficient for treatment  -repeat labs prior to chemo next week              Oncology    Lung cancer, main bronchus, left    Overview     8/30/2019 underwent bronchoscopy that showed "A partially obstructing (about 80% obstructed) mass was found in the middle portion in the left mainstem bronchus. The mass was large and bloody, circumferential, " "endobronchial, friable and necrotic. 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  No actionable mutations and PD-L1 5%.  9/19/21 staging PET CT showed "Hypermetabolic left lung mass concerning for primary pulmonary malignancy with metastatic disease involving the right 6th and 9th ribs as well as mediastinal and left supraclavicular lymph nodes."  Stage IV squamous cell carcinoma of the lung at diagnosis.    10/8/2019-10/21/2019 he received palliative chemoradiation for rib pain with carboplatin and paclitaxel.  He received 3 cycles of carboplatin and paclitaxel.  He developed anaphylactic reaction to paclitaxel.  The chest was treated with AP:PA fields and the right 9th rib was treated with anterior and posterior oblique fields at 300 cGy per fraction to 3000 cGy.   Lt chest 6X 300 10 / 10 3,000   Rt 9th rib 6X 300 10 / 10 3,000     10/31/2019-9/10/2020 he received pembrolizumab (completed 15 cycles, last dose 8/21/2020)  9/10/2020 PET CT showed progression of disease.  He was then referred to Dr. Key for evaluation for clinical trials.  10/20/2020 he underwent repeat biopsy for LUNG MAP trial and was randomized to Ramucirumab + pembrolizumab.    11/17/2020 started ramucirumab+pembrolizumab.  Completed 4 cycles and then 2/10/2021 scans showed progression.    2/24/2021 he was subsequent started on gemcitabine with Dr. Cruz.           Current Assessment & Plan     5/26/22 restaging scans show slight interval enlargement of known target lesion.  However, patient has missed several doses of chemo between 2/2022 and now so unlikely true treatment failure.  -will repeat labs next week prior to chemo; if labs are ok, ok to proceed with treatment  -continue with q2w labs and follow up in 1 month.           Secondary malignant neoplasm of bone          No orders of the defined types were placed in this encounter.          Mario Hathaway, " MD  Hematology Oncology

## 2022-05-27 NOTE — Clinical Note
When to follow up: 6/24 Labs needed: port draw 5/31, 6/14, 6/24 CBC and Comprehensive Metabolic Panel  Chemotherapy Regimen:  Next Treatment Date Upcoming Treatment Dates - OP NSCLC GEMCITABINE Q2W 5/31/2022      gemcitabine (GEMZAR) 2,210 mg in sodium chloride 0.9% 250 mL chemo infusion 6/14/2022      gemcitabine (GEMZAR) 2,210 mg in sodium chloride 0.9% 250 mL chemo infusion 6/28/2022      gemcitabine (GEMZAR) 2,210 mg in sodium chloride 0.9% 250 mL chemo infusion  Provider: Rivas

## 2022-05-27 NOTE — ASSESSMENT & PLAN NOTE
Admitted again 5/2022 for acute on chronic systolic and diastolic heart failure requiring intubation.  CT scan with moderate right pleural effusion, which has since resolved since diuresis during his hospital admission  -continue current diuretics and follow up with cardiology.

## 2022-05-27 NOTE — ASSESSMENT & PLAN NOTE
Follows with cardiology. Recently started on cilostazol. Has intermittent RLE claudication but it is not severe. Has not yet tried mowing his lawn  -told him to try mowing his lawn now that he's on cilostazol. If he can mow his lawn again, anticipate he would likely prefer to stay on cilostazol over getting an invasive procedure.  He will follow up with cardiology in July.

## 2022-05-31 ENCOUNTER — INFUSION (OUTPATIENT)
Dept: INFUSION THERAPY | Facility: HOSPITAL | Age: 62
End: 2022-05-31
Attending: STUDENT IN AN ORGANIZED HEALTH CARE EDUCATION/TRAINING PROGRAM
Payer: MEDICARE

## 2022-05-31 VITALS
DIASTOLIC BLOOD PRESSURE: 65 MMHG | RESPIRATION RATE: 16 BRPM | HEART RATE: 79 BPM | SYSTOLIC BLOOD PRESSURE: 123 MMHG | OXYGEN SATURATION: 97 % | TEMPERATURE: 98 F

## 2022-05-31 DIAGNOSIS — C34.02 LUNG CANCER, MAIN BRONCHUS, LEFT: Primary | ICD-10-CM

## 2022-05-31 DIAGNOSIS — R91.8 MULTIPLE PULMONARY NODULES: ICD-10-CM

## 2022-05-31 DIAGNOSIS — C79.51 SECONDARY MALIGNANT NEOPLASM OF BONE: ICD-10-CM

## 2022-05-31 LAB
ALBUMIN SERPL BCP-MCNC: 3.1 G/DL (ref 3.5–5.2)
ALP SERPL-CCNC: 90 U/L (ref 55–135)
ALT SERPL W/O P-5'-P-CCNC: 33 U/L (ref 10–44)
ANION GAP SERPL CALC-SCNC: 9 MMOL/L (ref 8–16)
AST SERPL-CCNC: 26 U/L (ref 10–40)
BASOPHILS # BLD AUTO: 0.06 K/UL (ref 0–0.2)
BASOPHILS NFR BLD: 0.5 % (ref 0–1.9)
BILIRUB SERPL-MCNC: 0.6 MG/DL (ref 0.1–1)
BUN SERPL-MCNC: 11 MG/DL (ref 8–23)
CALCIUM SERPL-MCNC: 8.6 MG/DL (ref 8.7–10.5)
CHLORIDE SERPL-SCNC: 105 MMOL/L (ref 95–110)
CO2 SERPL-SCNC: 27 MMOL/L (ref 23–29)
CREAT SERPL-MCNC: 0.9 MG/DL (ref 0.5–1.4)
DIFFERENTIAL METHOD: ABNORMAL
EOSINOPHIL # BLD AUTO: 0.1 K/UL (ref 0–0.5)
EOSINOPHIL NFR BLD: 1.2 % (ref 0–8)
ERYTHROCYTE [DISTWIDTH] IN BLOOD BY AUTOMATED COUNT: 16.2 % (ref 11.5–14.5)
EST. GFR  (AFRICAN AMERICAN): >60 ML/MIN/1.73 M^2
EST. GFR  (NON AFRICAN AMERICAN): >60 ML/MIN/1.73 M^2
GLUCOSE SERPL-MCNC: 162 MG/DL (ref 70–110)
HCT VFR BLD AUTO: 35 % (ref 40–54)
HGB BLD-MCNC: 11.6 G/DL (ref 14–18)
IMM GRANULOCYTES # BLD AUTO: 0.05 K/UL (ref 0–0.04)
IMM GRANULOCYTES NFR BLD AUTO: 0.4 % (ref 0–0.5)
LYMPHOCYTES # BLD AUTO: 1.3 K/UL (ref 1–4.8)
LYMPHOCYTES NFR BLD: 11.8 % (ref 18–48)
MCH RBC QN AUTO: 32 PG (ref 27–31)
MCHC RBC AUTO-ENTMCNC: 33.1 G/DL (ref 32–36)
MCV RBC AUTO: 96 FL (ref 82–98)
MONOCYTES # BLD AUTO: 1.2 K/UL (ref 0.3–1)
MONOCYTES NFR BLD: 10.9 % (ref 4–15)
NEUTROPHILS # BLD AUTO: 8.4 K/UL (ref 1.8–7.7)
NEUTROPHILS NFR BLD: 75.2 % (ref 38–73)
NRBC BLD-RTO: 0 /100 WBC
PLATELET # BLD AUTO: 220 K/UL (ref 150–450)
PMV BLD AUTO: 10.3 FL (ref 9.2–12.9)
POTASSIUM SERPL-SCNC: 3 MMOL/L (ref 3.5–5.1)
PROT SERPL-MCNC: 6.1 G/DL (ref 6–8.4)
RBC # BLD AUTO: 3.63 M/UL (ref 4.6–6.2)
SODIUM SERPL-SCNC: 141 MMOL/L (ref 136–145)
WBC # BLD AUTO: 11.23 K/UL (ref 3.9–12.7)

## 2022-05-31 PROCEDURE — A4216 STERILE WATER/SALINE, 10 ML: HCPCS | Performed by: STUDENT IN AN ORGANIZED HEALTH CARE EDUCATION/TRAINING PROGRAM

## 2022-05-31 PROCEDURE — 96413 CHEMO IV INFUSION 1 HR: CPT

## 2022-05-31 PROCEDURE — 96375 TX/PRO/DX INJ NEW DRUG ADDON: CPT

## 2022-05-31 PROCEDURE — 85025 COMPLETE CBC W/AUTO DIFF WBC: CPT | Performed by: STUDENT IN AN ORGANIZED HEALTH CARE EDUCATION/TRAINING PROGRAM

## 2022-05-31 PROCEDURE — 36591 DRAW BLOOD OFF VENOUS DEVICE: CPT

## 2022-05-31 PROCEDURE — 80053 COMPREHEN METABOLIC PANEL: CPT | Performed by: STUDENT IN AN ORGANIZED HEALTH CARE EDUCATION/TRAINING PROGRAM

## 2022-05-31 PROCEDURE — 25000003 PHARM REV CODE 250: Performed by: STUDENT IN AN ORGANIZED HEALTH CARE EDUCATION/TRAINING PROGRAM

## 2022-05-31 PROCEDURE — 63600175 PHARM REV CODE 636 W HCPCS: Performed by: STUDENT IN AN ORGANIZED HEALTH CARE EDUCATION/TRAINING PROGRAM

## 2022-05-31 RX ORDER — POTASSIUM CHLORIDE 20 MEQ/1
40 TABLET, EXTENDED RELEASE ORAL
Status: COMPLETED | OUTPATIENT
Start: 2022-05-31 | End: 2022-05-31

## 2022-05-31 RX ORDER — ONDANSETRON 2 MG/ML
8 INJECTION INTRAMUSCULAR; INTRAVENOUS
Status: COMPLETED | OUTPATIENT
Start: 2022-05-31 | End: 2022-05-31

## 2022-05-31 RX ORDER — SODIUM CHLORIDE 0.9 % (FLUSH) 0.9 %
10 SYRINGE (ML) INJECTION
Status: DISCONTINUED | OUTPATIENT
Start: 2022-05-31 | End: 2022-06-01 | Stop reason: HOSPADM

## 2022-05-31 RX ORDER — HEPARIN 100 UNIT/ML
500 SYRINGE INTRAVENOUS
Status: DISCONTINUED | OUTPATIENT
Start: 2022-05-31 | End: 2022-06-01 | Stop reason: HOSPADM

## 2022-05-31 RX ADMIN — ONDANSETRON 8 MG: 2 INJECTION INTRAMUSCULAR; INTRAVENOUS at 02:05

## 2022-05-31 RX ADMIN — POTASSIUM CHLORIDE 40 MEQ: 1500 TABLET, EXTENDED RELEASE ORAL at 02:05

## 2022-05-31 RX ADMIN — GEMCITABINE HYDROCHLORIDE 2200 MG: 2 INJECTION, SOLUTION INTRAVENOUS at 02:05

## 2022-05-31 RX ADMIN — HEPARIN 500 UNITS: 100 SYRINGE at 03:05

## 2022-05-31 RX ADMIN — Medication 10 ML: at 03:05

## 2022-06-01 NOTE — PLAN OF CARE
Patient arrived to unit for C10D15 Gemzar infusion ( off schedule due to hospitalization 5/17). Pt ill appearing and recently discharged from hospital from Acute respiratory failure.Pt was discharged with home O2,pt reports he feels he does not need it. Pt states his wife checks his VS, including pulse ox. Encouraged pt to use home O2 when he meets parameters. Pt Met with Dr. Hathaway on 5/27. Stat labs today, reviewed and cleared for chemo per Dr. Hathaway. Plan of care reviewed, patient agreeable to plan. Patient tolerated infusion well. 30 min post observation completed. VSS. Discharge instructions reviewed, patient instructed to return 6/14 for C11D1. Patient ambulated off unit accompanied by wife. Patient in NAD at time of discharge.

## 2022-06-02 ENCOUNTER — TELEPHONE (OUTPATIENT)
Dept: HEMATOLOGY/ONCOLOGY | Facility: CLINIC | Age: 62
End: 2022-06-02
Payer: MEDICARE

## 2022-06-03 ENCOUNTER — TELEPHONE (OUTPATIENT)
Dept: HEMATOLOGY/ONCOLOGY | Facility: CLINIC | Age: 62
End: 2022-06-03
Payer: MEDICARE

## 2022-06-03 NOTE — TELEPHONE ENCOUNTER
Spoke with wife of patient. Natty Iverson, wife of patient made aware of scheduled labs and follow up appointment with  on [place] for 6/23/2022. Natty Iverson agreed and acknowledge understanding of all scheduled appointments.

## 2022-06-03 NOTE — TELEPHONE ENCOUNTER
----- Message from Aj Cespedes MA sent at 6/1/2022  8:00 AM CDT -----    ----- Message -----  From: Mario Hathaway MD  Sent: 5/31/2022  10:42 AM CDT  To: Aj Cespedes MA, [facility]  Pool    If he's willing to come to [facility], can we set him up for port draw labs and follow up with me in the afternoon while im on hospital service 6/24? Thanks -e  ----- Message -----  From: Aj Cespedes MA  Sent: 5/27/2022   2:41 PM CDT  To: Mario Hathaway MD    Your schedule for 6/24/2022 is not open. I don't think you are here on that day. Please advise.    Thanks,    Aj    ----- Message -----  From: Mario Hathaway MD  Sent: 5/27/2022   2:35 PM CDT  To: [facility] Hem Onc Clinical Staff, #    When to follow up: 6/24  Labs needed: port draw 5/31, 6/14, 6/24 CBC and Comprehensive Metabolic Panel   Chemotherapy Regimen:   Next Treatment Date  Upcoming Treatment Dates - OP NSCLC GEMCITABINE Q2W  5/31/2022       gemcitabine (GEMZAR) 2,210 mg in sodium chloride 0.9% 250 mL chemo infusion  6/14/2022       gemcitabine (GEMZAR) 2,210 mg in sodium chloride 0.9% 250 mL chemo infusion  6/28/2022       gemcitabine (GEMZAR) 2,210 mg in sodium chloride 0.9% 250 mL chemo infusion    Provider: Rivas

## 2022-06-10 ENCOUNTER — HOSPITAL ENCOUNTER (OUTPATIENT)
Facility: HOSPITAL | Age: 62
Discharge: HOME | End: 2022-06-11
Attending: EMERGENCY MEDICINE | Admitting: EMERGENCY MEDICINE
Payer: MEDICARE

## 2022-06-10 ENCOUNTER — TELEPHONE (OUTPATIENT)
Dept: HEMATOLOGY/ONCOLOGY | Facility: CLINIC | Age: 62
End: 2022-06-10
Payer: MEDICARE

## 2022-06-10 DIAGNOSIS — M79.89 LEFT LEG SWELLING: ICD-10-CM

## 2022-06-10 DIAGNOSIS — R09.02 HYPOXIA: ICD-10-CM

## 2022-06-10 DIAGNOSIS — R06.02 SHORTNESS OF BREATH: ICD-10-CM

## 2022-06-10 PROBLEM — C34.02: Chronic | Status: ACTIVE | Noted: 2019-09-10

## 2022-06-10 PROBLEM — I50.42 CHRONIC COMBINED SYSTOLIC AND DIASTOLIC HEART FAILURE: Chronic | Status: ACTIVE | Noted: 2022-02-23

## 2022-06-10 PROBLEM — Z79.899 IMMUNODEFICIENCY DUE TO DRUGS: Chronic | Status: ACTIVE | Noted: 2021-08-17

## 2022-06-10 PROBLEM — I25.10 CORONARY ARTERY DISEASE INVOLVING NATIVE CORONARY ARTERY OF NATIVE HEART WITHOUT ANGINA PECTORIS: Chronic | Status: ACTIVE | Noted: 2022-03-14

## 2022-06-10 PROBLEM — J96.21 ACUTE ON CHRONIC RESPIRATORY FAILURE WITH HYPOXIA: Status: ACTIVE | Noted: 2022-06-10

## 2022-06-10 PROBLEM — J47.1 BRONCHIECTASIS WITH ACUTE EXACERBATION: Status: ACTIVE | Noted: 2022-06-10

## 2022-06-10 PROBLEM — D84.821 IMMUNODEFICIENCY DUE TO DRUGS: Chronic | Status: ACTIVE | Noted: 2021-08-17

## 2022-06-10 LAB
ALBUMIN SERPL BCP-MCNC: 3.1 G/DL (ref 3.5–5.2)
ALP SERPL-CCNC: 102 U/L (ref 55–135)
ALT SERPL W/O P-5'-P-CCNC: 28 U/L (ref 10–44)
ANION GAP SERPL CALC-SCNC: 9 MMOL/L (ref 8–16)
APTT BLDCRRT: 28.6 SEC (ref 21–32)
AST SERPL-CCNC: 25 U/L (ref 10–40)
BASOPHILS # BLD AUTO: 0.04 K/UL (ref 0–0.2)
BASOPHILS NFR BLD: 0.4 % (ref 0–1.9)
BILIRUB SERPL-MCNC: 0.8 MG/DL (ref 0.1–1)
BNP SERPL-MCNC: 206 PG/ML (ref 0–99)
BUN SERPL-MCNC: 9 MG/DL (ref 8–23)
CALCIUM SERPL-MCNC: 8.6 MG/DL (ref 8.7–10.5)
CHLORIDE SERPL-SCNC: 107 MMOL/L (ref 95–110)
CO2 SERPL-SCNC: 24 MMOL/L (ref 23–29)
CREAT SERPL-MCNC: 0.8 MG/DL (ref 0.5–1.4)
CTP QC/QA: YES
CTP QC/QA: YES
DIFFERENTIAL METHOD: ABNORMAL
EOSINOPHIL # BLD AUTO: 0.1 K/UL (ref 0–0.5)
EOSINOPHIL NFR BLD: 1.4 % (ref 0–8)
ERYTHROCYTE [DISTWIDTH] IN BLOOD BY AUTOMATED COUNT: 15.7 % (ref 11.5–14.5)
EST. GFR  (AFRICAN AMERICAN): >60 ML/MIN/1.73 M^2
EST. GFR  (NON AFRICAN AMERICAN): >60 ML/MIN/1.73 M^2
GLUCOSE SERPL-MCNC: 134 MG/DL (ref 70–110)
HCT VFR BLD AUTO: 35.4 % (ref 40–54)
HGB BLD-MCNC: 12.1 G/DL (ref 14–18)
IMM GRANULOCYTES # BLD AUTO: 0.06 K/UL (ref 0–0.04)
IMM GRANULOCYTES NFR BLD AUTO: 0.6 % (ref 0–0.5)
INR PPP: 1.1 (ref 0.8–1.2)
LYMPHOCYTES # BLD AUTO: 1.3 K/UL (ref 1–4.8)
LYMPHOCYTES NFR BLD: 13.5 % (ref 18–48)
MCH RBC QN AUTO: 32.2 PG (ref 27–31)
MCHC RBC AUTO-ENTMCNC: 34.2 G/DL (ref 32–36)
MCV RBC AUTO: 94 FL (ref 82–98)
MONOCYTES # BLD AUTO: 1 K/UL (ref 0.3–1)
MONOCYTES NFR BLD: 10.5 % (ref 4–15)
NEUTROPHILS # BLD AUTO: 6.9 K/UL (ref 1.8–7.7)
NEUTROPHILS NFR BLD: 73.6 % (ref 38–73)
NRBC BLD-RTO: 0 /100 WBC
PLATELET # BLD AUTO: 169 K/UL (ref 150–450)
PMV BLD AUTO: 9.8 FL (ref 9.2–12.9)
POC MOLECULAR INFLUENZA A AGN: NEGATIVE
POC MOLECULAR INFLUENZA B AGN: NEGATIVE
POTASSIUM SERPL-SCNC: 3.3 MMOL/L (ref 3.5–5.1)
PROT SERPL-MCNC: 6.5 G/DL (ref 6–8.4)
PROTHROMBIN TIME: 11.6 SEC (ref 9–12.5)
RBC # BLD AUTO: 3.76 M/UL (ref 4.6–6.2)
SARS-COV-2 RDRP RESP QL NAA+PROBE: NEGATIVE
SODIUM SERPL-SCNC: 140 MMOL/L (ref 136–145)
TROPONIN I SERPL DL<=0.01 NG/ML-MCNC: 0.07 NG/ML (ref 0–0.03)
WBC # BLD AUTO: 9.34 K/UL (ref 3.9–12.7)

## 2022-06-10 PROCEDURE — 25000242 PHARM REV CODE 250 ALT 637 W/ HCPCS: Performed by: HOSPITALIST

## 2022-06-10 PROCEDURE — 80053 COMPREHEN METABOLIC PANEL: CPT | Performed by: EMERGENCY MEDICINE

## 2022-06-10 PROCEDURE — 94761 N-INVAS EAR/PLS OXIMETRY MLT: CPT | Mod: 59

## 2022-06-10 PROCEDURE — 25000003 PHARM REV CODE 250: Performed by: HOSPITALIST

## 2022-06-10 PROCEDURE — 25500020 PHARM REV CODE 255: Performed by: EMERGENCY MEDICINE

## 2022-06-10 PROCEDURE — 96374 THER/PROPH/DIAG INJ IV PUSH: CPT | Mod: 59

## 2022-06-10 PROCEDURE — 94760 N-INVAS EAR/PLS OXIMETRY 1: CPT

## 2022-06-10 PROCEDURE — 93010 ELECTROCARDIOGRAM REPORT: CPT | Mod: ,,, | Performed by: INTERNAL MEDICINE

## 2022-06-10 PROCEDURE — 99291 CRITICAL CARE FIRST HOUR: CPT | Mod: 25

## 2022-06-10 PROCEDURE — 25000003 PHARM REV CODE 250: Performed by: EMERGENCY MEDICINE

## 2022-06-10 PROCEDURE — 96372 THER/PROPH/DIAG INJ SC/IM: CPT | Performed by: HOSPITALIST

## 2022-06-10 PROCEDURE — G0378 HOSPITAL OBSERVATION PER HR: HCPCS

## 2022-06-10 PROCEDURE — 85025 COMPLETE CBC W/AUTO DIFF WBC: CPT | Performed by: EMERGENCY MEDICINE

## 2022-06-10 PROCEDURE — 27000221 HC OXYGEN, UP TO 24 HOURS

## 2022-06-10 PROCEDURE — 87502 INFLUENZA DNA AMP PROBE: CPT

## 2022-06-10 PROCEDURE — 93010 EKG 12-LEAD: ICD-10-PCS | Mod: ,,, | Performed by: INTERNAL MEDICINE

## 2022-06-10 PROCEDURE — 93005 ELECTROCARDIOGRAM TRACING: CPT

## 2022-06-10 PROCEDURE — 63600175 PHARM REV CODE 636 W HCPCS: Performed by: HOSPITALIST

## 2022-06-10 PROCEDURE — 84484 ASSAY OF TROPONIN QUANT: CPT | Performed by: EMERGENCY MEDICINE

## 2022-06-10 PROCEDURE — U0002 COVID-19 LAB TEST NON-CDC: HCPCS | Performed by: EMERGENCY MEDICINE

## 2022-06-10 PROCEDURE — 83880 ASSAY OF NATRIURETIC PEPTIDE: CPT | Performed by: EMERGENCY MEDICINE

## 2022-06-10 PROCEDURE — 25000242 PHARM REV CODE 250 ALT 637 W/ HCPCS: Performed by: EMERGENCY MEDICINE

## 2022-06-10 PROCEDURE — 85730 THROMBOPLASTIN TIME PARTIAL: CPT | Performed by: EMERGENCY MEDICINE

## 2022-06-10 PROCEDURE — 63600175 PHARM REV CODE 636 W HCPCS: Performed by: EMERGENCY MEDICINE

## 2022-06-10 PROCEDURE — 94640 AIRWAY INHALATION TREATMENT: CPT

## 2022-06-10 PROCEDURE — 85610 PROTHROMBIN TIME: CPT | Performed by: EMERGENCY MEDICINE

## 2022-06-10 RX ORDER — ASPIRIN 325 MG
325 TABLET ORAL
Status: COMPLETED | OUTPATIENT
Start: 2022-06-10 | End: 2022-06-10

## 2022-06-10 RX ORDER — ENOXAPARIN SODIUM 100 MG/ML
40 INJECTION SUBCUTANEOUS EVERY 24 HOURS
Status: DISCONTINUED | OUTPATIENT
Start: 2022-06-10 | End: 2022-06-11 | Stop reason: HOSPADM

## 2022-06-10 RX ORDER — IPRATROPIUM BROMIDE AND ALBUTEROL SULFATE 2.5; .5 MG/3ML; MG/3ML
3 SOLUTION RESPIRATORY (INHALATION)
Status: COMPLETED | OUTPATIENT
Start: 2022-06-10 | End: 2022-06-10

## 2022-06-10 RX ORDER — CLOPIDOGREL BISULFATE 75 MG/1
75 TABLET ORAL DAILY
Status: DISCONTINUED | OUTPATIENT
Start: 2022-06-11 | End: 2022-06-11 | Stop reason: HOSPADM

## 2022-06-10 RX ORDER — ONDANSETRON 2 MG/ML
4 INJECTION INTRAMUSCULAR; INTRAVENOUS EVERY 12 HOURS PRN
Status: DISCONTINUED | OUTPATIENT
Start: 2022-06-10 | End: 2022-06-11 | Stop reason: HOSPADM

## 2022-06-10 RX ORDER — PREDNISONE 20 MG/1
40 TABLET ORAL DAILY
Status: DISCONTINUED | OUTPATIENT
Start: 2022-06-11 | End: 2022-06-11 | Stop reason: HOSPADM

## 2022-06-10 RX ORDER — FUROSEMIDE 10 MG/ML
40 INJECTION INTRAMUSCULAR; INTRAVENOUS 2 TIMES DAILY WITH MEALS
Status: DISCONTINUED | OUTPATIENT
Start: 2022-06-11 | End: 2022-06-11 | Stop reason: HOSPADM

## 2022-06-10 RX ORDER — METHYLPREDNISOLONE SOD SUCC 125 MG
125 VIAL (EA) INJECTION
Status: COMPLETED | OUTPATIENT
Start: 2022-06-10 | End: 2022-06-10

## 2022-06-10 RX ORDER — TALC
6 POWDER (GRAM) TOPICAL NIGHTLY PRN
Status: DISCONTINUED | OUTPATIENT
Start: 2022-06-10 | End: 2022-06-11 | Stop reason: HOSPADM

## 2022-06-10 RX ORDER — SODIUM CHLORIDE 0.9 % (FLUSH) 0.9 %
3 SYRINGE (ML) INJECTION
Status: DISCONTINUED | OUTPATIENT
Start: 2022-06-10 | End: 2022-06-11 | Stop reason: HOSPADM

## 2022-06-10 RX ORDER — NAPROXEN SODIUM 220 MG/1
81 TABLET, FILM COATED ORAL DAILY
Status: DISCONTINUED | OUTPATIENT
Start: 2022-06-11 | End: 2022-06-11 | Stop reason: HOSPADM

## 2022-06-10 RX ORDER — ACETAMINOPHEN 325 MG/1
650 TABLET ORAL EVERY 6 HOURS PRN
Status: DISCONTINUED | OUTPATIENT
Start: 2022-06-10 | End: 2022-06-11 | Stop reason: HOSPADM

## 2022-06-10 RX ORDER — LEVOFLOXACIN 750 MG/1
750 TABLET ORAL DAILY
Status: DISCONTINUED | OUTPATIENT
Start: 2022-06-11 | End: 2022-06-11 | Stop reason: HOSPADM

## 2022-06-10 RX ORDER — ENOXAPARIN SODIUM 100 MG/ML
1 INJECTION SUBCUTANEOUS
Status: DISCONTINUED | OUTPATIENT
Start: 2022-06-10 | End: 2022-06-10

## 2022-06-10 RX ORDER — LOSARTAN POTASSIUM 25 MG/1
100 TABLET ORAL DAILY
Status: DISCONTINUED | OUTPATIENT
Start: 2022-06-11 | End: 2022-06-11 | Stop reason: HOSPADM

## 2022-06-10 RX ORDER — IPRATROPIUM BROMIDE AND ALBUTEROL SULFATE 2.5; .5 MG/3ML; MG/3ML
3 SOLUTION RESPIRATORY (INHALATION)
Status: DISCONTINUED | OUTPATIENT
Start: 2022-06-10 | End: 2022-06-11 | Stop reason: HOSPADM

## 2022-06-10 RX ORDER — CARVEDILOL 6.25 MG/1
6.25 TABLET ORAL 2 TIMES DAILY
Status: DISCONTINUED | OUTPATIENT
Start: 2022-06-10 | End: 2022-06-11 | Stop reason: HOSPADM

## 2022-06-10 RX ORDER — AMLODIPINE BESYLATE 5 MG/1
10 TABLET ORAL DAILY
Status: DISCONTINUED | OUTPATIENT
Start: 2022-06-11 | End: 2022-06-11 | Stop reason: HOSPADM

## 2022-06-10 RX ORDER — CILOSTAZOL 100 MG/1
100 TABLET ORAL 2 TIMES DAILY
Status: DISCONTINUED | OUTPATIENT
Start: 2022-06-10 | End: 2022-06-11 | Stop reason: HOSPADM

## 2022-06-10 RX ORDER — ATORVASTATIN CALCIUM 40 MG/1
80 TABLET, FILM COATED ORAL DAILY
Status: DISCONTINUED | OUTPATIENT
Start: 2022-06-11 | End: 2022-06-11 | Stop reason: HOSPADM

## 2022-06-10 RX ORDER — POLYETHYLENE GLYCOL 3350 17 G/17G
17 POWDER, FOR SOLUTION ORAL DAILY
Status: DISCONTINUED | OUTPATIENT
Start: 2022-06-11 | End: 2022-06-11 | Stop reason: HOSPADM

## 2022-06-10 RX ORDER — PROCHLORPERAZINE EDISYLATE 5 MG/ML
5 INJECTION INTRAMUSCULAR; INTRAVENOUS EVERY 6 HOURS PRN
Status: DISCONTINUED | OUTPATIENT
Start: 2022-06-10 | End: 2022-06-11 | Stop reason: HOSPADM

## 2022-06-10 RX ADMIN — CARVEDILOL 6.25 MG: 6.25 TABLET, FILM COATED ORAL at 10:06

## 2022-06-10 RX ADMIN — ENOXAPARIN SODIUM 40 MG: 40 INJECTION SUBCUTANEOUS at 10:06

## 2022-06-10 RX ADMIN — CILOSTAZOL 100 MG: 100 TABLET ORAL at 10:06

## 2022-06-10 RX ADMIN — IOHEXOL 75 ML: 350 INJECTION, SOLUTION INTRAVENOUS at 03:06

## 2022-06-10 RX ADMIN — IPRATROPIUM BROMIDE AND ALBUTEROL SULFATE 3 ML: 2.5; .5 SOLUTION RESPIRATORY (INHALATION) at 07:06

## 2022-06-10 RX ADMIN — IPRATROPIUM BROMIDE AND ALBUTEROL SULFATE 3 ML: 2.5; .5 SOLUTION RESPIRATORY (INHALATION) at 04:06

## 2022-06-10 RX ADMIN — METHYLPREDNISOLONE SODIUM SUCCINATE 125 MG: 125 INJECTION, POWDER, FOR SOLUTION INTRAMUSCULAR; INTRAVENOUS at 07:06

## 2022-06-10 RX ADMIN — POTASSIUM BICARBONATE 40 MEQ: 391 TABLET, EFFERVESCENT ORAL at 07:06

## 2022-06-10 RX ADMIN — ASPIRIN 325 MG ORAL TABLET 325 MG: 325 PILL ORAL at 07:06

## 2022-06-10 NOTE — ED TRIAGE NOTES
Pt presents to ED from home with complaints of shortness of breath. According to pt's wife, pt woke up at 0200 this morning due to a panic attack accompanied by an oxygen saturation of 80%. Pt states she applied 2L of O2 via nasal cannula with minimal increase in oxygen level. Pt also reports left foot swelling as well as left leg tightness. Pt denies chest pain, n/v/d.

## 2022-06-10 NOTE — TELEPHONE ENCOUNTER
Rec'd incoming call from Natty Iverson, wife patient regarding patient had a panic attack last night, feet swelling, and O2 saturation as low as 83%-81%. Natty Iverson was encouraged to take patient to nearest ED for further evaluation. Natty Iverson verbalized understanding and will proceed to ED with the patient.

## 2022-06-10 NOTE — ED NOTES
"RN CALLED LAB TO SEE WHY BNP HAS NOT RESULTED. LAB STATES THAT TWO LAVENDERS NEEDED TO BE SENT DUE TO LABS BEING PROCESSED IN DIFFERENT LOCATIONS. RN THAT PULLED BLOOD STATES SHE SENT TWO LAVENDERS. RN TOLD LAB THAT CBC AND BNP HAVE BEEN RUN BY [facility] ON MULTIPLE [facility] AND THAT IT HAS BEEN 3 HOURS SINCE BNP HAS NOT RESULTED AND THEY SHOULD'VE AT LEAST CALLED TO NOTIFY ER IF THEY NEEDED ANOTHER TUBE. LAB STATES THEY DIDN'T SEE IT BECAUSE "IT WASN'T SCANNED" EVEN THOUGH IT SHOWS IT WAS. LAB STATES THEY WILL RUN THE BNP ON THE LAVENDER THAT WAS SENT FOR THE CBC. MD NOTIFIED.   "

## 2022-06-10 NOTE — ED PROVIDER NOTES
"Encounter Date: 6/10/2022    SCRIBE #1 NOTE: ILeander, am scribing for, and in the presence of, Yumi Delvalle MD.       History     Chief Complaint   Patient presents with    Shortness of Breath    Leg Swelling     Patient c/o sob,left leg swelling began this morning approx 0200, Hx of stage 4 lung cancer, COPD, CHF.   Denies numbness, tingling, chest pain, fever, chills, n/v/d. Patient able to speak in complete sentences at triage.     Kashif Iverson is a 61 y.o. male with a PMHx of lung cancer, CHF, and HTN who presents to the Emergency Department for evaluation of a panic attack with associated shortness of breath that occurred at 0200. Patient is accompanied by his wife who reports placing the patient on 3L home oxygen. She states the patient's SpO2 dropped as low as 80%. Patient is also complaining of a 3 day history of left leg swelling. Patient's wife explains the patient is unable to wear shoes secondary to left foot pain. Patient describes the skin on his left leg "feels tight". Patient endorses compliance with his medication regimen and today's doses. He denies any chest pain, fever, nausea, vomiting, or other associated symptoms.      The history is provided by the patient and the spouse.     Review of patient's allergies indicates:  No Known Allergies  Past Medical History:   Diagnosis Date    Combined systolic and diastolic heart failure     Hypertension     Lung cancer     NSCLC    Lung mass     left lung     Past Surgical History:   Procedure Laterality Date    BRONCHOSCOPY N/A 8/30/2019    Procedure: Bronchoscopy;  Surgeon: Miguel Diagnostic Provider;  Location: [facility] OR [address] [place] [address];  Service: Anesthesiology;  Laterality: N/A;    CORONARY ANGIOGRAPHY N/A 3/4/2022    Procedure: ANGIOGRAM, CORONARY ARTERY;  Surgeon: Robson Green MD;  Location: [facility] CATH LAB;  Service: Cardiology;  Laterality: N/A;     Family History   Problem Relation Age of Onset    Diabetes Mother     Heart defect " Mother     Cancer Father     Cancer Sister     No Known Problems Son      Social History     Tobacco Use    Smoking status: Former Smoker     Packs/day: 1.00     Years: 25.00     Pack years: 25.00     Types: Cigarettes     Quit date:      Years since quittin.4    Smokeless tobacco: Never Used   Substance Use Topics    Alcohol use: Yes     Comment: ocassionally    Drug use: Never     Review of Systems   Constitutional: Negative for diaphoresis and fever.   HENT: Negative for sore throat.    Eyes: Negative for pain.   Respiratory: Positive for shortness of breath.    Cardiovascular: Positive for leg swelling. Negative for chest pain.   Gastrointestinal: Negative for abdominal pain and nausea.   Genitourinary: Negative for dysuria.   Musculoskeletal: Positive for arthralgias. Negative for back pain.   Skin: Negative for rash.   Neurological: Negative for weakness.   Psychiatric/Behavioral: The patient is nervous/anxious.        Physical Exam     Initial Vitals [06/10/22 1348]   BP Pulse Resp Temp SpO2   (!) 175/90 (!) 112 20 97.9 °F (36.6 °C) (!) 88 %      MAP       --         Physical Exam    Nursing note and vitals reviewed.  Constitutional: He is not diaphoretic. No distress.   HENT:   Head: Normocephalic and atraumatic.   Mouth/Throat: Oropharynx is clear and moist.   Eyes: Conjunctivae and EOM are normal. No scleral icterus.   Neck: Neck supple. No tracheal deviation present.   Normal range of motion.  Cardiovascular: Regular rhythm and intact distal pulses. Tachycardia present.    Pulmonary/Chest: Breath sounds normal. No stridor. No respiratory distress.   Abdominal: Abdomen is soft. He exhibits no distension. There is no abdominal tenderness.   Musculoskeletal:         General: No tenderness. Normal range of motion.      Cervical back: Normal range of motion and neck supple.      Left lower leg: Edema present.     Neurological: He is alert. He has normal strength. No cranial nerve deficit or  sensory deficit. GCS score is 15. GCS eye subscore is 4. GCS verbal subscore is 5. GCS motor subscore is 6.   Skin: Skin is warm and dry.   Psychiatric: He has a normal mood and affect.         ED Course   Procedures  Labs Reviewed   CBC W/ AUTO DIFFERENTIAL - Abnormal; Notable for the following components:       Result Value    RBC 3.76 (*)     Hemoglobin 12.1 (*)     Hematocrit 35.4 (*)     MCH 32.2 (*)     RDW 15.7 (*)     Immature Granulocytes 0.6 (*)     Immature Grans (Abs) 0.06 (*)     Gran % 73.6 (*)     Lymph % 13.5 (*)     All other components within normal limits   COMPREHENSIVE METABOLIC PANEL - Abnormal; Notable for the following components:    Potassium 3.3 (*)     Glucose 134 (*)     Calcium 8.6 (*)     Albumin 3.1 (*)     All other components within normal limits   TROPONIN I - Abnormal; Notable for the following components:    Troponin I 0.069 (*)     All other components within normal limits   B-TYPE NATRIURETIC PEPTIDE - Abnormal; Notable for the following components:     (*)     All other components within normal limits   APTT   PROTIME-INR   SARS-COV-2 RDRP GENE   POCT INFLUENZA A/B MOLECULAR     EKG Readings: (Independently Interpreted)   Initial Reading: No STEMI. Rhythm: Normal Sinus Rhythm. Heart Rate: 100. Ectopy: No Ectopy. Conduction: Incomplete RBBB. ST Segments: Non-Specific ST Segment Depression.       Imaging Results          US Lower Extremity Veins Left (Final result)  Result time 06/10/22 16:31:56    Final result by Jonathan Mclaughlin MD (06/10/22 16:31:56)                 Impression:      No evidence of deep venous thrombosis in the left lower extremity.      Electronically signed by: Jonathan Mclaughlin MD  Date:    06/10/2022  Time:    16:31             Narrative:    EXAMINATION:  US LOWER EXTREMITY VEINS LEFT    CLINICAL HISTORY:  Other specified soft tissue disorders    TECHNIQUE:  Duplex and color flow Doppler evaluation and graded compression of the left lower extremity  veins was performed.    COMPARISON:  10/15/2021    FINDINGS:  Duplex and color flow Doppler evaluation does not reveal any evidence of acute venous thrombosis in the common femoral, superficial femoral, greater saphenous, popliteal, peroneal, anterior tibial and posterior tibial veins of the left lower extremity.  There is no reflux to suggest valvular incompetence.  There is left lower extremity subcutaneous edema.                               CTA Chest Non-Coronary - PE Study (Final result)  Result time 06/10/22 16:30:56    Final result by Arjun Dejesus DO (06/10/22 16:30:56)                 Impression:      1. Limited examination, no pulmonary embolism to the proximal segmental level.  2. Interlobular septal thickening and diffuse bronchial wall thickening suspicious for interstitial pulmonary edema.  3. Redemonstration of a pleural based nodule in the left upper lobe suspicious for local recurrence or pleural/pulmonary metastasis, unchanged in size and appearance from prior.  4. Moderate right and small left pleural effusions.  5. Radiation changes in the left upper lung.      Electronically signed by: Arjun Dejesus  Date:    06/10/2022  Time:    16:30             Narrative:    EXAMINATION:  CTA CHEST NON CORONARY    CLINICAL HISTORY:  Pulmonary embolism (PE) suspected, high prob;    TECHNIQUE:  Low dose axial images, sagittal and coronal reformations were obtained from the thoracic inlet to the lung bases following the IV administration of 75 mL of Omnipaque 350.  Contrast timing was optimized to evaluate the pulmonary arteries.  Maximum intensity projection images were provided for review.    COMPARISON:  CT of the chest, abdomen, and pelvis from 05/26/2022.    FINDINGS:  Pulmonary vasculature: Limited evaluation secondary to poor contrast bolus timing and motion artifact.  There is no evidence of a filling defect within the central, lobar, or proximal segmental pulmonary arteries.  Distal segmental and  subsegmental pulmonary arteries are not well evaluated.    Aorta: Left-sided aortic arch.  No aneurysm.  Mild atherosclerosis.    Base of Neck: No significant abnormality.    Thoracic soft tissues: There is a right anterior chest wall port.    Heart: Normal size. No effusion.    Leonarda/Mediastinum: No pathologic ciro enlargement.    Airways: There is continued complete occlusion of the left upper lobe bronchus.  Scattered bronchial wall thickening and mucous plugging is noted in the lingula such as series 2, image 215).  Diffuse bronchial wall thickening is also seen elsewhere, suspicious for pulmonary edema.    Lungs/Pleura: Again seen is irregular bandlike soft tissue in the left upper lobe abutting the mediastinal pleura with adjacent pleural thickening, unchanged in size and configuration from prior.  There is a wedge-shaped opacity in the superior segment of the left lower lobe, also unchanged.  There are additional bandlike opacities throughout the left lung with there is volume loss, traction bronchiectasis, and architectural distortion compatible with radiation changes.  Again seen is a subpleural nodule in the paramediastinal left upper lobe measuring approximately 1.8 by 1.2 cm (series 2, image 138), unchanged in size from prior but increased in size from 02/22/2022.  Unchanged 5 mm pulmonary nodule in the left lower lobe (series 2, image 35).  Additional scattered micro nodules are seen within the bilateral lungs, stable.  No new pulmonary nodules are seen.    There are moderate right and small left pleural effusions.  There is passive atelectasis in the right lower lobe posteriorly.  There is interlobular septal thickening.  There are background moderate centrilobular emphysematous changes.    Esophagus: Normal.    Upper Abdomen: No abnormality of the partially imaged upper abdomen.    Bones: No acute fracture. No suspicious lytic or sclerotic lesions.                               X-Ray Chest AP Portable  (Final result)  Result time 06/10/22 14:19:44    Final result by Montrell Hsu MD (06/10/22 14:19:44)                 Impression:      No significant interval change      Electronically signed by: Montrell Hsu MD  Date:    06/10/2022  Time:    14:19             Narrative:    EXAMINATION:  XR CHEST AP PORTABLE    CLINICAL HISTORY:  CHF;    TECHNIQUE:  Single frontal view of the chest was performed.    COMPARISON:  05/19/2022    FINDINGS:  Right-sided chest port remains in place with the catheter tip probably in the right atrium.  Cardiomediastinal silhouette is unchanged.  Pulmonary vascularity appears stable.  Somewhat poor inspiration.  Continued interstitial and airspace opacities bilaterally with perihilar opacities, particularly on the left.  Continued blunting of both costophrenic angles with some pleural fluid or thickening along the lateral aspect of the left hemithorax.  No pneumothorax or other detrimental change.  Skeletal structures appear intact.                                 Medications   amLODIPine tablet 10 mg (has no administration in time range)   aspirin chewable tablet 81 mg (has no administration in time range)   atorvastatin tablet 80 mg (has no administration in time range)   carvediloL tablet 6.25 mg (6.25 mg Oral Given 6/10/22 2205)   cilostazoL tablet 100 mg (100 mg Oral Given 6/10/22 2206)   clopidogreL tablet 75 mg (has no administration in time range)   losartan tablet 100 mg (has no administration in time range)   furosemide injection 40 mg (has no administration in time range)   sodium chloride 0.9% flush 3 mL (has no administration in time range)   predniSONE tablet 40 mg (has no administration in time range)   albuterol-ipratropium 2.5 mg-0.5 mg/3 mL nebulizer solution 3 mL (3 mLs Nebulization Given 6/10/22 1951)   enoxaparin injection 40 mg (40 mg Subcutaneous Given 6/10/22 2206)   melatonin tablet 6 mg (has no administration in time range)   ondansetron injection 4 mg (has no  administration in time range)   prochlorperazine injection Soln 5 mg (has no administration in time range)   polyethylene glycol packet 17 g (has no administration in time range)   acetaminophen tablet 650 mg (has no administration in time range)   levoFLOXacin tablet 750 mg (has no administration in time range)   iohexoL (OMNIPAQUE 350) injection 75 mL (75 mLs Intravenous Given 6/10/22 1548)   albuterol-ipratropium 2.5 mg-0.5 mg/3 mL nebulizer solution 3 mL (3 mLs Nebulization Given 6/10/22 1657)   methylPREDNISolone sodium succinate injection 125 mg (125 mg Intravenous Given 6/10/22 1903)   aspirin tablet 325 mg (325 mg Oral Given 6/10/22 1903)   potassium bicarbonate disintegrating tablet 40 mEq (40 mEq Oral Given 6/10/22 1934)     Medical Decision Making:   History:   Old Medical Records: I decided to obtain old medical records.  Differential Diagnosis:   Differential diagnosis includes, but is not limited to: DVT, PE, COPD, pneumonia, anxiety, ACS, CHF.  Independently Interpreted Test(s):   I have ordered and independently interpreted EKG Reading(s) - see prior notes  Clinical Tests:   Lab Tests: Reviewed and Ordered  Radiological Study: Ordered and Reviewed  Medical Tests: Reviewed and Ordered  ED Management:  Patient hypoxic to the 80s on room air.  Oxygen saturation remains 88 - 89 on 2 L nasal cannula.  Patient requires 3-4 L nasal cannula to maintain oxygen saturation above 92%.  Ultrasound negative for DVT.  CTA of the chest negative for PE.  Troponin is elevated at 0.069.  This is significantly improved compared to previous.  BNP is mildly elevated at 206.  This is significantly improved compared to prior.  Chest x-ray does not suggest pulmonary edema.  Patient given Solu-Medrol and DuoNeb with some improvement symptoms however he remains hypoxic on 2 L nasal cannula.  Patient has tachycardia when hypoxic but tachycardia resolves when oxygen saturation improved.  Patient to be placed in observation for  schedule nebulizations, supplemental oxygen, serial troponin.    Please put in 30 minutes of critical care due to patient having a high risk of respiratory failure.   Separate from teaching and exclusive of procedure and ekg time  Includes:  Time at bedside  Time reviewing test results  Time discussing case with staff  Time documenting the medical record  Time spent with family members  Time spent with consults  Management   This chart was completed using dictation software, as a result there may be some transcription errors.           Scribe Attestation:   Scribe #1: I performed the above scribed service and the documentation accurately describes the services I performed. I attest to the accuracy of the note.                 Clinical Impression:   Final diagnoses:  [R06.02] Shortness of breath  [M79.89] Left leg swelling  [R09.02] Hypoxia          ED Disposition Condition    Observation             I, Yumi Delvalle , personally performed the services described in this documentation. All medical record entries made by the scribe were at my direction and in my presence. I have reviewed the chart and agree that the record reflects my personal performance and is accurate and complete.       Yumi Delvalle MD  06/10/22 [id number]

## 2022-06-11 VITALS
HEART RATE: 97 BPM | RESPIRATION RATE: 20 BRPM | WEIGHT: 199.5 LBS | OXYGEN SATURATION: 95 % | HEIGHT: 70 IN | BODY MASS INDEX: 28.56 KG/M2 | TEMPERATURE: 98 F | DIASTOLIC BLOOD PRESSURE: 81 MMHG | SYSTOLIC BLOOD PRESSURE: 140 MMHG

## 2022-06-11 PROBLEM — J96.21 ACUTE ON CHRONIC RESPIRATORY FAILURE WITH HYPOXIA: Status: RESOLVED | Noted: 2022-06-10 | Resolved: 2022-06-11

## 2022-06-11 PROBLEM — J47.1 BRONCHIECTASIS WITH ACUTE EXACERBATION: Status: RESOLVED | Noted: 2022-06-10 | Resolved: 2022-06-11

## 2022-06-11 LAB
ANION GAP SERPL CALC-SCNC: 10 MMOL/L (ref 8–16)
BASOPHILS # BLD AUTO: 0.01 K/UL (ref 0–0.2)
BASOPHILS NFR BLD: 0.2 % (ref 0–1.9)
BUN SERPL-MCNC: 10 MG/DL (ref 8–23)
CALCIUM SERPL-MCNC: 8.6 MG/DL (ref 8.7–10.5)
CHLORIDE SERPL-SCNC: 107 MMOL/L (ref 95–110)
CO2 SERPL-SCNC: 23 MMOL/L (ref 23–29)
CREAT SERPL-MCNC: 0.8 MG/DL (ref 0.5–1.4)
DIFFERENTIAL METHOD: ABNORMAL
EOSINOPHIL # BLD AUTO: 0 K/UL (ref 0–0.5)
EOSINOPHIL NFR BLD: 0 % (ref 0–8)
ERYTHROCYTE [DISTWIDTH] IN BLOOD BY AUTOMATED COUNT: 15.8 % (ref 11.5–14.5)
EST. GFR  (AFRICAN AMERICAN): >60 ML/MIN/1.73 M^2
EST. GFR  (NON AFRICAN AMERICAN): >60 ML/MIN/1.73 M^2
GLUCOSE SERPL-MCNC: 186 MG/DL (ref 70–110)
HCT VFR BLD AUTO: 37 % (ref 40–54)
HGB BLD-MCNC: 12.4 G/DL (ref 14–18)
IMM GRANULOCYTES # BLD AUTO: 0.02 K/UL (ref 0–0.04)
IMM GRANULOCYTES NFR BLD AUTO: 0.3 % (ref 0–0.5)
LYMPHOCYTES # BLD AUTO: 0.5 K/UL (ref 1–4.8)
LYMPHOCYTES NFR BLD: 9.2 % (ref 18–48)
MAGNESIUM SERPL-MCNC: 2 MG/DL (ref 1.6–2.6)
MCH RBC QN AUTO: 31.4 PG (ref 27–31)
MCHC RBC AUTO-ENTMCNC: 33.5 G/DL (ref 32–36)
MCV RBC AUTO: 94 FL (ref 82–98)
MONOCYTES # BLD AUTO: 0 K/UL (ref 0.3–1)
MONOCYTES NFR BLD: 0.7 % (ref 4–15)
NEUTROPHILS # BLD AUTO: 5.1 K/UL (ref 1.8–7.7)
NEUTROPHILS NFR BLD: 89.6 % (ref 38–73)
NRBC BLD-RTO: 0 /100 WBC
PHOSPHATE SERPL-MCNC: 3.3 MG/DL (ref 2.7–4.5)
PLATELET # BLD AUTO: 181 K/UL (ref 150–450)
PMV BLD AUTO: 9.9 FL (ref 9.2–12.9)
POTASSIUM SERPL-SCNC: 4 MMOL/L (ref 3.5–5.1)
RBC # BLD AUTO: 3.95 M/UL (ref 4.6–6.2)
SODIUM SERPL-SCNC: 140 MMOL/L (ref 136–145)
WBC # BLD AUTO: 5.73 K/UL (ref 3.9–12.7)

## 2022-06-11 PROCEDURE — 84100 ASSAY OF PHOSPHORUS: CPT | Performed by: HOSPITALIST

## 2022-06-11 PROCEDURE — 94640 AIRWAY INHALATION TREATMENT: CPT

## 2022-06-11 PROCEDURE — 25000003 PHARM REV CODE 250: Performed by: HOSPITALIST

## 2022-06-11 PROCEDURE — 85025 COMPLETE CBC W/AUTO DIFF WBC: CPT | Performed by: HOSPITALIST

## 2022-06-11 PROCEDURE — 80048 BASIC METABOLIC PNL TOTAL CA: CPT | Performed by: HOSPITALIST

## 2022-06-11 PROCEDURE — 25000242 PHARM REV CODE 250 ALT 637 W/ HCPCS: Performed by: HOSPITALIST

## 2022-06-11 PROCEDURE — 27000221 HC OXYGEN, UP TO 24 HOURS

## 2022-06-11 PROCEDURE — 96375 TX/PRO/DX INJ NEW DRUG ADDON: CPT | Performed by: EMERGENCY MEDICINE

## 2022-06-11 PROCEDURE — G0378 HOSPITAL OBSERVATION PER HR: HCPCS

## 2022-06-11 PROCEDURE — 94761 N-INVAS EAR/PLS OXIMETRY MLT: CPT

## 2022-06-11 PROCEDURE — 63600175 PHARM REV CODE 636 W HCPCS: Performed by: HOSPITALIST

## 2022-06-11 PROCEDURE — 96374 THER/PROPH/DIAG INJ IV PUSH: CPT | Performed by: EMERGENCY MEDICINE

## 2022-06-11 PROCEDURE — 83735 ASSAY OF MAGNESIUM: CPT | Performed by: HOSPITALIST

## 2022-06-11 PROCEDURE — 36415 COLL VENOUS BLD VENIPUNCTURE: CPT | Performed by: HOSPITALIST

## 2022-06-11 RX ORDER — FUROSEMIDE 40 MG/1
40 TABLET ORAL 2 TIMES DAILY
Qty: 60 TABLET | Refills: 2 | Status: SHIPPED | OUTPATIENT
Start: 2022-06-11 | End: 2022-01-01 | Stop reason: SDUPTHER

## 2022-06-11 RX ORDER — PREDNISONE 20 MG/1
40 TABLET ORAL DAILY
Qty: 10 TABLET | Refills: 0 | Status: SHIPPED | OUTPATIENT
Start: 2022-06-12 | End: 2022-06-17

## 2022-06-11 RX ORDER — CEFDINIR 300 MG/1
300 CAPSULE ORAL 2 TIMES DAILY
Qty: 10 CAPSULE | Refills: 0 | Status: SHIPPED | OUTPATIENT
Start: 2022-06-11 | End: 2022-06-16

## 2022-06-11 RX ADMIN — IPRATROPIUM BROMIDE AND ALBUTEROL SULFATE 3 ML: 2.5; .5 SOLUTION RESPIRATORY (INHALATION) at 02:06

## 2022-06-11 RX ADMIN — CARVEDILOL 6.25 MG: 6.25 TABLET, FILM COATED ORAL at 08:06

## 2022-06-11 RX ADMIN — IPRATROPIUM BROMIDE AND ALBUTEROL SULFATE 3 ML: 2.5; .5 SOLUTION RESPIRATORY (INHALATION) at 07:06

## 2022-06-11 RX ADMIN — LEVOFLOXACIN 750 MG: 750 TABLET, FILM COATED ORAL at 08:06

## 2022-06-11 RX ADMIN — PREDNISONE 40 MG: 20 TABLET ORAL at 08:06

## 2022-06-11 RX ADMIN — CLOPIDOGREL 75 MG: 75 TABLET, FILM COATED ORAL at 08:06

## 2022-06-11 RX ADMIN — FUROSEMIDE 40 MG: 10 INJECTION, SOLUTION INTRAMUSCULAR; INTRAVENOUS at 08:06

## 2022-06-11 RX ADMIN — AMLODIPINE BESYLATE 10 MG: 5 TABLET ORAL at 08:06

## 2022-06-11 RX ADMIN — POLYETHYLENE GLYCOL 3350 17 G: 17 POWDER, FOR SOLUTION ORAL at 08:06

## 2022-06-11 RX ADMIN — LOSARTAN POTASSIUM 100 MG: 25 TABLET, FILM COATED ORAL at 08:06

## 2022-06-11 RX ADMIN — CILOSTAZOL 100 MG: 100 TABLET ORAL at 08:06

## 2022-06-11 RX ADMIN — ATORVASTATIN CALCIUM 80 MG: 40 TABLET, FILM COATED ORAL at 08:06

## 2022-06-11 RX ADMIN — ASPIRIN 81 MG CHEWABLE TABLET 81 MG: 81 TABLET CHEWABLE at 08:06

## 2022-06-11 NOTE — NURSING
[facility] Nurses Note -- 4 Eyes      6/10/2022   9:32 PM      Skin assessed during: Admit assessment      [x] No Pressure Injuries Present    []Prevention Measures Documented      [] Yes- Altered Skin Integrity Present or Discovered   [] LDA Added if Not in Epic (Describe Wound)   [] New Altered Skin Integrity was Present on Admit and Documented in LDA   [] Wound Image Taken  Bruises noted to landy arms and abdomen.  Attending RN:  Jesusita Herman RN     Second RN:  Emilio Davis

## 2022-06-11 NOTE — H&P
[facility] Medicine  History & Physical    Patient Name: Kashif Iverson  MRN: [id number]  Patient Class: OP- Observation  Admission Date: 6/10/2022  Attending Physician: Valente Nava MD   Primary Care Provider: Eduardo Ruiz MD         Patient information was obtained from patient, past medical records and ER records.     Subjective:     Principal Problem:Acute on chronic respiratory failure with hypoxia    Chief Complaint:   Chief Complaint   Patient presents with    Shortness of Breath    Leg Swelling     Patient c/o sob,left leg swelling began this morning approx 0200, Hx of stage 4 lung cancer, COPD, CHF.   Denies numbness, tingling, chest pain, fever, chills, n/v/d. Patient able to speak in complete sentences at triage.        HPI: 61 y.o. male with stage IV lung cancer, bronchiectasis, chronic combined heart failure, HTN, CAD, and immunodeficiency due to chemotherapy presents with a complaint of shortness of breath.  He is chronically short of breath but became acutely severe early this morning, associated with low saturations on home pulse ox.  Uses home oxygen p.r.n. 2 L as needed but does not wear it very often at baseline.  No known exacerbating or alleviating factors, associated with lower extremity edema, attempted self treatment with home oxygen turned up to 3-4 L with some improvement.  Denies fever, chills, chest pain, palpitations, dizziness, syncope, nausea, vomiting, diarrhea, abdominal pain, bloody or black stools, or dysuria.  In the ED, no fever or leukocytosis, chest imaging with only possible mild pulmonary edema, CTA chest negative for PE, BNP only 206. He requires supplemental oxygen at 4 L nasal cannula to maintain oxygen saturations of 91 92% which is acutely worse than his baseline.  Corticosteroids, nebulizer treatments, and Lasix were initiated.  Placed in observation.      Past Medical History:   Diagnosis Date    Combined systolic and diastolic heart  failure     Hypertension     Lung cancer     NSCLC    Lung mass     left lung       Past Surgical History:   Procedure Laterality Date    BRONCHOSCOPY N/A 8/30/2019    Procedure: Bronchoscopy;  Surgeon: Miguel Diagnostic Provider;  Location: [facility] OR [address] [place] [address];  Service: Anesthesiology;  Laterality: N/A;    CORONARY ANGIOGRAPHY N/A 3/4/2022    Procedure: ANGIOGRAM, CORONARY ARTERY;  Surgeon: Robson Green MD;  Location: [facility] CATH LAB;  Service: Cardiology;  Laterality: N/A;       Review of patient's allergies indicates:  No Known Allergies    No current facility-administered medications on file prior to encounter.     Current Outpatient Medications on File Prior to Encounter   Medication Sig    albuterol (VENTOLIN HFA) 90 mcg/actuation inhaler Inhale 2 puffs into the lungs every 6 (six) hours as needed for Wheezing. Rescue    amLODIPine (NORVASC) 10 MG tablet Take 1 tablet (10 mg total) by mouth once daily.    ascorbic acid-multivit-min (VITAMIN C ENERGY BOOSTER) 1,000 mg PwEP Take by mouth. Patient takes 3,000 mg per day    aspirin 81 MG Chew Take 1 tablet (81 mg total) by mouth once daily.    atorvastatin (LIPITOR) 80 MG tablet Take 1 tablet (80 mg total) by mouth once daily.    carvediloL (COREG) 6.25 MG tablet Take 1 tablet (6.25 mg total) by mouth 2 (two) times daily.    cilostazoL (PLETAL) 100 MG Tab Take 1 tablet (100 mg total) by mouth 2 (two) times daily.    clopidogreL (PLAVIX) 75 mg tablet Take 1 tablet (75 mg total) by mouth once daily.    furosemide (LASIX) 40 MG tablet Take 1 tablet (40 mg total) by mouth 2 (two) times a day.    losartan (COZAAR) 100 MG tablet Take 1 tablet (100 mg total) by mouth once daily.    [DISCONTINUED] atenoloL (TENORMIN) 100 MG tablet Take 1 tablet (100 mg total) by mouth once daily.     Family History       Problem Relation (Age of Onset)    Cancer Father, Sister    Diabetes Mother    Heart defect Mother    No Known Problems Son          Tobacco Use    Smoking  status: Former Smoker     Packs/day: 1.00     Years: 25.00     Pack years: 25.00     Types: Cigarettes     Quit date:      Years since quittin.4    Smokeless tobacco: Never Used   Substance and Sexual Activity    Alcohol use: Yes     Comment: ocassionally    Drug use: Never    Sexual activity: Yes     Partners: Female     Review of Systems   Constitutional:  Negative for chills and fever.   Eyes:  Negative for photophobia and visual disturbance.   Respiratory:  Positive for shortness of breath. Negative for cough.    Cardiovascular:  Positive for leg swelling. Negative for chest pain and palpitations.   Gastrointestinal:  Negative for abdominal pain, diarrhea, nausea and vomiting.   Genitourinary:  Negative for frequency, hematuria and urgency.   Skin:  Negative for pallor, rash and wound.   Neurological:  Negative for light-headedness and headaches.   Psychiatric/Behavioral:  Negative for confusion and decreased concentration.    Objective:     Vital Signs (Most Recent):  Temp: 98.3 °F (36.8 °C) (06/10/22 2121)  Pulse: (!) 114 (06/10/22 2121)  Resp: 19 (06/10/22 2121)  BP: 138/87 (06/10/22 2121)  SpO2: (!) 93 % (06/10/22 2121)   Vital Signs (24h Range):  Temp:  [97.9 °F (36.6 °C)-98.3 °F (36.8 °C)] 98.3 °F (36.8 °C)  Pulse:  [] 114  Resp:  [17-20] 19  SpO2:  [86 %-97 %] 93 %  BP: (138-175)/() 138/87     Weight: 90.5 kg (199 lb 8.3 oz)  Body mass index is 28.63 kg/m².    Physical Exam  Vitals and nursing note reviewed.   Constitutional:       General: He is not in acute distress.     Appearance: He is well-developed.   HENT:      Head: Normocephalic and atraumatic.      Right Ear: External ear normal.      Left Ear: External ear normal.      Nose: Nose normal.   Eyes:      Extraocular Movements: EOM normal.      Conjunctiva/sclera: Conjunctivae normal.      Pupils: Pupils are equal, round, and reactive to light.   Cardiovascular:      Rate and Rhythm: Regular rhythm. Tachycardia present.    Pulmonary:      Effort: Accessory muscle usage present. No respiratory distress.      Breath sounds: Decreased breath sounds present. No wheezing or rales.   Abdominal:      General: Bowel sounds are normal. There is no distension.      Palpations: Abdomen is soft.      Tenderness: There is no abdominal tenderness.      Comments: No palpable hepatomegaly or splenomegaly   Musculoskeletal:         General: No tenderness. Normal range of motion.      Cervical back: Normal range of motion and neck supple.      Right lower leg: Edema present.      Left lower leg: Edema present.   Skin:     General: Skin is warm and dry.   Neurological:      Mental Status: He is alert and oriented to person, place, and time.   Psychiatric:         Thought Content: Thought content normal.         CRANIAL NERVES     CN III, IV, VI   Pupils are equal, round, and reactive to light.  Extraocular motions are normal.      Significant Labs: All pertinent labs within the past 24 hours have been reviewed.    Significant Imaging: I have reviewed all pertinent imaging results/findings within the past 24 hours.    Assessment/Plan:     * Acute on chronic respiratory failure with hypoxia  Patient with Hypoxic Respiratory failure which is Acute on chronic.  he is on home oxygen at 2 LPM. Supplemental oxygen was provided and noted-  .   Signs/symptoms of respiratory failure include- tachypnea, increased work of breathing, use of accessory muscles and wheezing. Contributing diagnoses includes - CHF, COPD and Lung cancer Labs and images were reviewed. Patient Has not had a recent ABG. Will treat underlying causes and adjust management of respiratory failure as follows- likely multifactorial, continue corticosteroids, nebulizer treatments, antibiotics, IV diuresis with Lasix and supplemental oxygen    Bronchiectasis with acute exacerbation  Management as above    Coronary artery disease involving native coronary artery of native heart without angina  pectoris  Continue home regimen of ASA/cilostazol/plavix/statin/BBl/ARB    Chronic combined systolic and diastolic heart failure  Possibly some element of acute on chronic, continue diuresis with IV Lasix and afterload reduction as tolerated, I&Os, daily weights.    Essential hypertension  Well controlled, continue home medications and monitor blood pressure, adjust as needed.     Immunodeficiency due to drugs  On chemo    Lung cancer, main bronchus, left  Follows with Dr. Hathaway      VTE Risk Mitigation (From admission, onward)         Ordered     enoxaparin injection 40 mg  Daily         06/10/22 1927     IP VTE HIGH RISK PATIENT  Once         06/10/22 1927     Place sequential compression device  Until discontinued         06/10/22 1927               As clarification, on 06/10/2022, patient should be placed in hospital observation services under my care in collaboration with MD Aaron Hatfield Jr., APRN, [facility]  Hospitalist - Department of Hospital Medicine  [facility] [address] [place] [address]  Office #: [phone]; Pager #: [phone]

## 2022-06-11 NOTE — HOSPITAL COURSE
The patient was admitted to the hospital for acute respiratory failure with hypoxia due to COPD exacerbation and CHF exacerbation.  Patient does have history of stage IV lung cancer.  Patient was placed on COPD order set, given IV Lasix, DuoNebs, Solu-Medrol, low flow oxygen , and antibiotics.  Patient reports he improved overnight.  Feels a lot better today.  He is hoping to be discharged home.  The patient is unsure if he has Lasix at home.  Did discuss refilling his prescription for Lasix 40 mg p.o. b.i.d..  He will continue the other medications from home.  Also given prednisone 40 mg daily for 5 days, cefdinir 300 mg p.o. b.i.d. for 5 days.  Patient will follow this PCP in 1 week.  Follow up in Oncology Clinic in 1-2 weeks or as scheduled.  The patient says he has oxygen at home that he uses as needed.  The patient if he develops chest pain, shortness of breath, nausea, vomiting, or abdominal pain type of symptoms he should go to the local ER.  He verbalized understanding.

## 2022-06-11 NOTE — ASSESSMENT & PLAN NOTE
Patient with Hypoxic Respiratory failure which is Acute on chronic.  he is on home oxygen at 2 LPM. Supplemental oxygen was provided and noted-  .   Signs/symptoms of respiratory failure include- tachypnea, increased work of breathing, use of accessory muscles and wheezing. Contributing diagnoses includes - CHF, COPD and Lung cancer Labs and images were reviewed. Patient Has not had a recent ABG. Will treat underlying causes and adjust management of respiratory failure as follows- likely multifactorial, continue corticosteroids, nebulizer treatments, antibiotics, IV diuresis with Lasix and supplemental oxygen

## 2022-06-11 NOTE — ASSESSMENT & PLAN NOTE
Possibly some element of acute on chronic, continue diuresis with IV Lasix and afterload reduction as tolerated, I&Os, daily weights.

## 2022-06-11 NOTE — PLAN OF CARE
Problem: Adult Inpatient Plan of Care  Goal: Plan of Care Review  Outcome: Ongoing, Progressing  Goal: Patient-Specific Goal (Individualized)  Outcome: Ongoing, Progressing  Goal: Absence of Hospital-Acquired Illness or Injury  Outcome: Ongoing, Progressing  Goal: Optimal Comfort and Wellbeing  Outcome: Ongoing, Progressing  Goal: Readiness for Transition of Care  Outcome: Ongoing, Progressing     Problem: Adjustment to Illness COPD (Chronic Obstructive Pulmonary Disease)  Goal: Optimal Chronic Illness Coping  Outcome: Ongoing, Progressing     Problem: Functional Ability Impaired COPD (Chronic Obstructive Pulmonary Disease)  Goal: Optimal Level of Functional Kleberg  Outcome: Ongoing, Progressing     Problem: Infection COPD (Chronic Obstructive Pulmonary Disease)  Goal: Absence of Infection Signs and Symptoms  Outcome: Ongoing, Progressing     Problem: Oral Intake Inadequate COPD (Chronic Obstructive Pulmonary Disease)  Goal: Improved Nutrition Intake  Outcome: Ongoing, Progressing     Problem: Respiratory Compromise COPD (Chronic Obstructive Pulmonary Disease)  Goal: Effective Oxygenation and Ventilation  Outcome: Ongoing, Progressing     Problem: Infection  Goal: Absence of Infection Signs and Symptoms  Outcome: Ongoing, Progressing     Problem: Impaired Wound Healing  Goal: Optimal Wound Healing  Outcome: Ongoing, Progressing     Problem: Fall Injury Risk  Goal: Absence of Fall and Fall-Related Injury  Outcome: Ongoing, Progressing

## 2022-06-11 NOTE — NURSING
RAPID RESPONSE NURSE PROACTIVE ROUNDING NOTE       Time of Visit: 905    Admit Date: 6/10/2022  LOS: 0  Code Status: Full Code   Date of Visit: 2022  : 1960  Age: 61 y.o.  Sex: male  Race: White  Bed: W300/W300 A:   MRN: [id number]  Was the patient discharged from an ICU this admission? No   Was the patient discharged from a PACU within last 24 hours? No   Did the patient receive conscious sedation/general anesthesia in last 24 hours? No   Was the patient in the ED within the past 24 hours? Yes   Was the patient on NIPPV within the past 24 hours? No   Attending Physician: Marlon Nath MD  Primary Service: Hospitalist   Time spent at the bedside: < 15 min    SITUATION    Notified by Epic patient alert  Reason for alert: MEWS 3, HR 120s    Diagnosis: Acute on chronic respiratory failure with hypoxia   has a past medical history of Combined systolic and diastolic heart failure, Hypertension, Lung cancer, and Lung mass.    Last Vitals:  Temp: 97.9 °F (36.6 °C) ( 111)  Pulse: 98 ( 1116)  Resp: 18 ( 111)  BP: 140/81 ( 111)  SpO2: 94 % (1116)    24 Hour Vitals Range:  Temp:  [97.9 °F (36.6 °C)-98.4 °F (36.9 °C)]   Pulse:  []   Resp:  [17-20]   BP: (136-175)/()   SpO2:  [86 %-98 %]     Clinical Issues: Respiratory    ASSESSMENT/INTERVENTIONS  Pt lying in bed in NAD. Awake and alert. Stated feeling ok. HR on monitor at this time 104, manual . 2 L NC. Pt was given scheduled carvedilol this AM.     RECOMMENDATIONS  Continue to monitor.    Discussed plan of care with Charge Santa PICKARD    PROVIDER ESCALATION    Physician escalation: No    Orders received and case discussed with NA.    Disposition:Remain in room 300    FOLLOW UP    Call back the Rapid Response NurseSherri at [phone] for additional questions or concerns.

## 2022-06-11 NOTE — HPI
61 y.o. male with stage IV lung cancer, bronchiectasis, chronic combined heart failure, HTN, CAD, and immunodeficiency due to chemotherapy presents with a complaint of shortness of breath.  He is chronically short of breath but became acutely severe early this morning, associated with low saturations on home pulse ox.  Uses home oxygen p.r.n. 2 L as needed but does not wear it very often at baseline.  No known exacerbating or alleviating factors, associated with lower extremity edema, attempted self treatment with home oxygen turned up to 3-4 L with some improvement.  Denies fever, chills, chest pain, palpitations, dizziness, syncope, nausea, vomiting, diarrhea, abdominal pain, bloody or black stools, or dysuria.  In the ED, no fever or leukocytosis, chest imaging with only possible mild pulmonary edema, CTA chest negative for PE, BNP only 206. He requires supplemental oxygen at 4 L nasal cannula to maintain oxygen saturations of 91 92% which is acutely worse than his baseline.  Corticosteroids, nebulizer treatments, and Lasix were initiated.  Placed in observation.

## 2022-06-11 NOTE — SUBJECTIVE & OBJECTIVE
Past Medical History:   Diagnosis Date    Combined systolic and diastolic heart failure     Hypertension     Lung cancer     NSCLC    Lung mass     left lung       Past Surgical History:   Procedure Laterality Date    BRONCHOSCOPY N/A 8/30/2019    Procedure: Bronchoscopy;  Surgeon: Miguel Diagnostic Provider;  Location: [address];  Service: Anesthesiology;  Laterality: N/A;    CORONARY ANGIOGRAPHY N/A 3/4/2022    Procedure: ANGIOGRAM, CORONARY ARTERY;  Surgeon: Robson Green MD;  Location: [facility] CATH LAB;  Service: Cardiology;  Laterality: N/A;       Review of patient's allergies indicates:  No Known Allergies    No current facility-administered medications on file prior to encounter.     Current Outpatient Medications on File Prior to Encounter   Medication Sig    albuterol (VENTOLIN HFA) 90 mcg/actuation inhaler Inhale 2 puffs into the lungs every 6 (six) hours as needed for Wheezing. Rescue    amLODIPine (NORVASC) 10 MG tablet Take 1 tablet (10 mg total) by mouth once daily.    ascorbic acid-multivit-min (VITAMIN C ENERGY BOOSTER) 1,000 mg PwEP Take by mouth. Patient takes 3,000 mg per day    aspirin 81 MG Chew Take 1 tablet (81 mg total) by mouth once daily.    atorvastatin (LIPITOR) 80 MG tablet Take 1 tablet (80 mg total) by mouth once daily.    carvediloL (COREG) 6.25 MG tablet Take 1 tablet (6.25 mg total) by mouth 2 (two) times daily.    cilostazoL (PLETAL) 100 MG Tab Take 1 tablet (100 mg total) by mouth 2 (two) times daily.    clopidogreL (PLAVIX) 75 mg tablet Take 1 tablet (75 mg total) by mouth once daily.    furosemide (LASIX) 40 MG tablet Take 1 tablet (40 mg total) by mouth 2 (two) times a day.    losartan (COZAAR) 100 MG tablet Take 1 tablet (100 mg total) by mouth once daily.    [DISCONTINUED] atenoloL (TENORMIN) 100 MG tablet Take 1 tablet (100 mg total) by mouth once daily.     Family History       Problem Relation (Age of Onset)    Cancer Father, Sister    Diabetes Mother    Heart defect  Mother    No Known Problems Son          Tobacco Use    Smoking status: Former Smoker     Packs/day: 1.00     Years: 25.00     Pack years: 25.00     Types: Cigarettes     Quit date:      Years since quittin.4    Smokeless tobacco: Never Used   Substance and Sexual Activity    Alcohol use: Yes     Comment: ocassionally    Drug use: Never    Sexual activity: Yes     Partners: Female     Review of Systems   Constitutional:  Negative for chills and fever.   Eyes:  Negative for photophobia and visual disturbance.   Respiratory:  Positive for shortness of breath. Negative for cough.    Cardiovascular:  Positive for leg swelling. Negative for chest pain and palpitations.   Gastrointestinal:  Negative for abdominal pain, diarrhea, nausea and vomiting.   Genitourinary:  Negative for frequency, hematuria and urgency.   Skin:  Negative for pallor, rash and wound.   Neurological:  Negative for light-headedness and headaches.   Psychiatric/Behavioral:  Negative for confusion and decreased concentration.    Objective:     Vital Signs (Most Recent):  Temp: 98.3 °F (36.8 °C) (06/10/22 2121)  Pulse: (!) 114 (06/10/22 2121)  Resp: 19 (06/10/22 2121)  BP: 138/87 (06/10/22 2121)  SpO2: (!) 93 % (06/10/22 2121)   Vital Signs (24h Range):  Temp:  [97.9 °F (36.6 °C)-98.3 °F (36.8 °C)] 98.3 °F (36.8 °C)  Pulse:  [] 114  Resp:  [17-20] 19  SpO2:  [86 %-97 %] 93 %  BP: (138-175)/() 138/87     Weight: 90.5 kg (199 lb 8.3 oz)  Body mass index is 28.63 kg/m².    Physical Exam  Vitals and nursing note reviewed.   Constitutional:       General: He is not in acute distress.     Appearance: He is well-developed.   HENT:      Head: Normocephalic and atraumatic.      Right Ear: External ear normal.      Left Ear: External ear normal.      Nose: Nose normal.   Eyes:      Extraocular Movements: EOM normal.      Conjunctiva/sclera: Conjunctivae normal.      Pupils: Pupils are equal, round, and reactive to light.   Cardiovascular:       Rate and Rhythm: Regular rhythm. Tachycardia present.   Pulmonary:      Effort: Accessory muscle usage present. No respiratory distress.      Breath sounds: Decreased breath sounds present. No wheezing or rales.   Abdominal:      General: Bowel sounds are normal. There is no distension.      Palpations: Abdomen is soft.      Tenderness: There is no abdominal tenderness.      Comments: No palpable hepatomegaly or splenomegaly   Musculoskeletal:         General: No tenderness. Normal range of motion.      Cervical back: Normal range of motion and neck supple.      Right lower leg: Edema present.      Left lower leg: Edema present.   Skin:     General: Skin is warm and dry.   Neurological:      Mental Status: He is alert and oriented to person, place, and time.   Psychiatric:         Thought Content: Thought content normal.         CRANIAL NERVES     CN III, IV, VI   Pupils are equal, round, and reactive to light.  Extraocular motions are normal.      Significant Labs: All pertinent labs within the past 24 hours have been reviewed.    Significant Imaging: I have reviewed all pertinent imaging results/findings within the past 24 hours.

## 2022-06-11 NOTE — PLAN OF CARE
[facility] - Telemetry  Discharge Final Note    Primary Care Provider: Eduardo Ruiz MD    Expected Discharge Date: 6/11/2022    Final Discharge Note (most recent)       Final Note - 06/11/22 1252          Final Note    Anticipated Discharge Disposition [facility]     What phone number can be called within the next 1-3 days to see how you are doing after discharge? [phone]     Hospital Resources/Appts/Education Provided Provided patient/caregiver with written discharge plan information;Appointments scheduled and added to AVS;Appointments scheduled by Navigator/Coordinator        Post-Acute Status    Post-Acute Authorization Home Health     Home Health Status Set-up Complete/Auth obtained     Discharge Delays None known at this time                     Important Message from Medicare             Contact Info       Eduardo Ruiz MD   Specialty: Family Medicine   Relationship: PCP - General  Hypertension Digital Medicine Responsible Provider    [address] [address]   Phone: [phone]       Next Steps: Follow up in 1 week(s)    [facility] [address] [address]   Phone: [phone]       Next Steps: Follow up    Instructions: Home Health          SW spoke with Annel ([facility]) informing her of patient discharging today.    SW secure chat patient's nurse Jyoti patient is cleared from case management standpoint.

## 2022-06-11 NOTE — PLAN OF CARE
[facility] - Telemetry  Discharge Assessment    Primary Care Provider: Eduardo Ruiz MD     Discharge Assessment (most recent)       BRIEF DISCHARGE ASSESSMENT - 06/11/22 0811          Discharge Planning    Assessment Type Discharge Planning Brief Assessment     Resource/Environmental Concerns none     Support Systems Spouse/significant other     Equipment Currently Used at Home oxygen     Current Living Arrangements home/apartment/condo     Patient/Family Anticipates Transition to home with family     Patient/Family Anticipated Services at Transition home health care (P)    [facility]     DME Needed Upon Discharge  other (see comments)   TBD    Discharge Plan A Home with family     Discharge Plan B Home with family

## 2022-06-11 NOTE — DISCHARGE SUMMARY
[facility] Medicine  Discharge Summary      Patient Name: Kashif Iverson  MRN: [id number]  Patient Class: OP- Observation  Admission Date: 6/10/2022  Hospital Length of Stay: 0 days  Discharge Date and Time:  06/11/2022 12:03 PM  Attending Physician: Marlon Nath MD   Discharging Provider: Marlon Nath MD  Primary Care Provider: Eduardo Ruiz MD      HPI:   61 y.o. male with stage IV lung cancer, bronchiectasis, chronic combined heart failure, HTN, CAD, and immunodeficiency due to chemotherapy presents with a complaint of shortness of breath.  He is chronically short of breath but became acutely severe early this morning, associated with low saturations on home pulse ox.  Uses home oxygen p.r.n. 2 L as needed but does not wear it very often at baseline.  No known exacerbating or alleviating factors, associated with lower extremity edema, attempted self treatment with home oxygen turned up to 3-4 L with some improvement.  Denies fever, chills, chest pain, palpitations, dizziness, syncope, nausea, vomiting, diarrhea, abdominal pain, bloody or black stools, or dysuria.  In the ED, no fever or leukocytosis, chest imaging with only possible mild pulmonary edema, CTA chest negative for PE, BNP only 206. He requires supplemental oxygen at 4 L nasal cannula to maintain oxygen saturations of 91 92% which is acutely worse than his baseline.  Corticosteroids, nebulizer treatments, and Lasix were initiated.  Placed in observation.      * No surgery found *      Hospital Course:   The patient was admitted to the hospital for acute respiratory failure with hypoxia due to COPD exacerbation and CHF exacerbation.  Patient does have history of stage IV lung cancer.  Patient was placed on COPD order set, given IV Lasix, DuoNebs, Solu-Medrol, low flow oxygen , and antibiotics.  Patient reports he improved overnight.  Feels a lot better today.  He is hoping to be discharged home.  The patient is unsure if he has  Lasix at home.  Did discuss refilling his prescription for Lasix 40 mg p.o. b.i.d..  He will continue the other medications from home.  Also given prednisone 40 mg daily for 5 days, cefdinir 300 mg p.o. b.i.d. for 5 days.  Patient will follow this PCP in 1 week.  Follow up in Oncology Clinic in 1-2 weeks or as scheduled.  The patient says he has oxygen at home that he uses as needed.  The patient if he develops chest pain, shortness of breath, nausea, vomiting, or abdominal pain type of symptoms he should go to the local ER.  He verbalized understanding.       Goals of Care Treatment Preferences:  Code Status: Full Code          What is most important right now is to focus on spending time at home, avoiding the hospital, remaining as independent as possible, symptom/pain control, quality of life, even if it means sacrificing a little time.  Accordingly, we have decided that the best plan to meet the patient's goals includes enrolling in hospice care.      Consults:     Coronary artery disease involving native coronary artery of native heart without angina pectoris  Continue home regimen of ASA/cilostazol/plavix/statin/BBl/ARB    Chronic combined systolic and diastolic heart failure  Possibly some element of acute on chronic  -continue Lasix 40 mg p.o. b.i.d. at home, Coreg 6.25 mg p.o. b.i.d., losartan 100 mg p.o. q.day.  - daily weights.  -low-sodium diet    Essential hypertension  Well controlled, continue home medications and monitor blood pressure, adjust as needed.     Immunodeficiency due to drugs  On chemo    Lung cancer, main bronchus, left  Follows with Dr. Hathaway      Final Active Diagnoses:    Diagnosis Date Noted POA    Coronary artery disease involving native coronary artery of native heart without angina pectoris [I25.10] 03/14/2022 Yes     Chronic    Chronic combined systolic and diastolic heart failure [I50.42] 02/23/2022 Yes     Chronic    Essential hypertension [I10]  Yes     Chronic     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, Z79.899] 08/17/2021 Yes     Chronic    Lung cancer, main bronchus, left [C34.02] 09/10/2019 Yes     Chronic      Problems Resolved During this Admission:    Diagnosis Date Noted Date Resolved POA    PRINCIPAL PROBLEM:  Acute on chronic respiratory failure with hypoxia [J96.21] 06/10/2022 06/11/2022 Yes    Bronchiectasis with acute exacerbation [J47.1] 06/10/2022 06/11/2022 Yes       Discharged Condition: good    Disposition: Home or Self Care    Follow Up:   Follow-up Information     Eduardo Ruiz MD Follow up in 1 week(s).    Specialty: Family Medicine  Contact information:  [address]  [place] [address] [phone]                       Patient Instructions:   No discharge procedures on file.    Significant Diagnostic Studies: Labs:   CMP   Recent Labs   Lab 06/10/22  1435 06/11/22  0343    140   K 3.3* 4.0    107   CO2 24 23   * 186*   BUN 9 10   CREATININE 0.8 0.8   CALCIUM 8.6* 8.6*   PROT 6.5  --    ALBUMIN 3.1*  --    BILITOT 0.8  --    ALKPHOS 102  --    AST 25  --    ALT 28  --    ANIONGAP 9 10   ESTGFRAFRICA >60 >60   EGFRNONAA >60 >60   , CBC   Recent Labs   Lab 06/10/22  1435 06/11/22  0343   WBC 9.34 5.73   HGB 12.1* 12.4*   HCT 35.4* 37.0*    181   , Lipid Panel   Lab Results   Component Value Date    CHOL 125 02/23/2022    HDL 26 (L) 02/23/2022    LDLCALC 54.2 (L) 02/23/2022    TRIG 224 (H) 02/23/2022    CHOLHDL 20.8 02/23/2022   , Troponin   Recent Labs   Lab 06/10/22  1435   TROPONINI 0.069*    and A1C:   Recent Labs   Lab 02/23/22  0407   HGBA1C 5.0       Pending Diagnostic Studies:     None         Medications:  Reconciled Home Medications:      Medication List      START taking these medications    cefdinir 300 MG capsule  Commonly known as: OMNICEF  Take 1 capsule (300 mg total) by mouth 2 (two) times daily. Take with food for 5 days     predniSONE 20 MG tablet  Commonly known as: DELTASONE  Take 2 tablets (40  mg total) by mouth once daily. Take with food for 5 days  Start taking on: June 12, 2022        CONTINUE taking these medications    albuterol 90 mcg/actuation inhaler  Commonly known as: VENTOLIN HFA  Inhale 2 puffs into the lungs every 6 (six) hours as needed for Wheezing. Rescue     amLODIPine 10 MG tablet  Commonly known as: NORVASC  Take 1 tablet (10 mg total) by mouth once daily.     aspirin 81 MG Chew  Take 1 tablet (81 mg total) by mouth once daily.     atorvastatin 80 MG tablet  Commonly known as: LIPITOR  Take 1 tablet (80 mg total) by mouth once daily.     carvediloL 6.25 MG tablet  Commonly known as: COREG  Take 1 tablet (6.25 mg total) by mouth 2 (two) times daily.     cilostazoL 100 MG Tab  Commonly known as: PLETAL  Take 1 tablet (100 mg total) by mouth 2 (two) times daily.     clopidogreL 75 mg tablet  Commonly known as: PLAVIX  Take 1 tablet (75 mg total) by mouth once daily.     furosemide 40 MG tablet  Commonly known as: LASIX  Take 1 tablet (40 mg total) by mouth 2 (two) times a day.     losartan 100 MG tablet  Commonly known as: COZAAR  Take 1 tablet (100 mg total) by mouth once daily.     VITAMIN C ENERGY BOOSTER 1,000 mg Pwep  Generic drug: ascorbic acid-multivit-min  Take by mouth. Patient takes 3,000 mg per day        STOP taking these medications    atenoloL 100 MG tablet  Commonly known as: TENORMIN            Indwelling Lines/Drains at time of discharge:   Lines/Drains/Airways     Central Venous Catheter Line  Duration           Port A Cath Single Lumen right subclavian -- days                Time spent on the discharge of patient: greater than 30 minutes         Marlon Nath MD  Department of Hospital Medicine  [facility] [place] [facility]

## 2022-06-11 NOTE — PLAN OF CARE
Problem: Adult Inpatient Plan of Care  Goal: Plan of Care Review  Outcome: Ongoing, Progressing  Goal: Patient-Specific Goal (Individualized)  Outcome: Ongoing, Progressing  Goal: Absence of Hospital-Acquired Illness or Injury  Outcome: Ongoing, Progressing  Goal: Optimal Comfort and Wellbeing  Outcome: Ongoing, Progressing  Goal: Readiness for Transition of Care  Outcome: Ongoing, Progressing     Problem: Adjustment to Illness COPD (Chronic Obstructive Pulmonary Disease)  Goal: Optimal Chronic Illness Coping  Outcome: Ongoing, Progressing     Problem: Functional Ability Impaired COPD (Chronic Obstructive Pulmonary Disease)  Goal: Optimal Level of Functional [place]  Outcome: Ongoing, Progressing     Problem: Infection COPD (Chronic Obstructive Pulmonary Disease)  Goal: Absence of Infection Signs and Symptoms  Outcome: Ongoing, Progressing     Problem: Oral Intake Inadequate COPD (Chronic Obstructive Pulmonary Disease)  Goal: Improved Nutrition Intake  Outcome: Ongoing, Progressing     Problem: Respiratory Compromise COPD (Chronic Obstructive Pulmonary Disease)  Goal: Effective Oxygenation and Ventilation  Outcome: Ongoing, Progressing     Problem: Fall Injury Risk  Goal: Absence of Fall and Fall-Related Injury  Outcome: Ongoing, Progressing

## 2022-06-11 NOTE — ASSESSMENT & PLAN NOTE
Possibly some element of acute on chronic  -continue Lasix 40 mg p.o. b.i.d. at home, Coreg 6.25 mg p.o. b.i.d., losartan 100 mg p.o. q.day.  - daily weights.  -low-sodium diet

## 2022-06-14 ENCOUNTER — INFUSION (OUTPATIENT)
Dept: INFUSION THERAPY | Facility: HOSPITAL | Age: 62
End: 2022-06-14
Attending: STUDENT IN AN ORGANIZED HEALTH CARE EDUCATION/TRAINING PROGRAM
Payer: MEDICARE

## 2022-06-14 VITALS
TEMPERATURE: 98 F | OXYGEN SATURATION: 95 % | HEART RATE: 84 BPM | SYSTOLIC BLOOD PRESSURE: 120 MMHG | RESPIRATION RATE: 18 BRPM | DIASTOLIC BLOOD PRESSURE: 67 MMHG

## 2022-06-14 DIAGNOSIS — R91.8 MULTIPLE PULMONARY NODULES: ICD-10-CM

## 2022-06-14 DIAGNOSIS — C34.02 LUNG CANCER, MAIN BRONCHUS, LEFT: Primary | ICD-10-CM

## 2022-06-14 DIAGNOSIS — C79.51 SECONDARY MALIGNANT NEOPLASM OF BONE: ICD-10-CM

## 2022-06-14 LAB
ALBUMIN SERPL BCP-MCNC: 3.3 G/DL (ref 3.5–5.2)
ALP SERPL-CCNC: 102 U/L (ref 55–135)
ALT SERPL W/O P-5'-P-CCNC: 50 U/L (ref 10–44)
ANION GAP SERPL CALC-SCNC: 10 MMOL/L (ref 8–16)
AST SERPL-CCNC: 39 U/L (ref 10–40)
BASOPHILS # BLD AUTO: 0.01 K/UL (ref 0–0.2)
BASOPHILS NFR BLD: 0.1 % (ref 0–1.9)
BILIRUB SERPL-MCNC: 0.4 MG/DL (ref 0.1–1)
BUN SERPL-MCNC: 26 MG/DL (ref 8–23)
CALCIUM SERPL-MCNC: 8.6 MG/DL (ref 8.7–10.5)
CHLORIDE SERPL-SCNC: 105 MMOL/L (ref 95–110)
CO2 SERPL-SCNC: 24 MMOL/L (ref 23–29)
CREAT SERPL-MCNC: 1.2 MG/DL (ref 0.5–1.4)
DIFFERENTIAL METHOD: ABNORMAL
EOSINOPHIL # BLD AUTO: 0 K/UL (ref 0–0.5)
EOSINOPHIL NFR BLD: 0 % (ref 0–8)
ERYTHROCYTE [DISTWIDTH] IN BLOOD BY AUTOMATED COUNT: 16.4 % (ref 11.5–14.5)
EST. GFR  (AFRICAN AMERICAN): >60 ML/MIN/1.73 M^2
EST. GFR  (NON AFRICAN AMERICAN): >60 ML/MIN/1.73 M^2
GLUCOSE SERPL-MCNC: 222 MG/DL (ref 70–110)
HCT VFR BLD AUTO: 34.2 % (ref 40–54)
HGB BLD-MCNC: 11.3 G/DL (ref 14–18)
IMM GRANULOCYTES # BLD AUTO: 0.05 K/UL (ref 0–0.04)
IMM GRANULOCYTES NFR BLD AUTO: 0.4 % (ref 0–0.5)
LYMPHOCYTES # BLD AUTO: 0.6 K/UL (ref 1–4.8)
LYMPHOCYTES NFR BLD: 5.4 % (ref 18–48)
MCH RBC QN AUTO: 32.1 PG (ref 27–31)
MCHC RBC AUTO-ENTMCNC: 33 G/DL (ref 32–36)
MCV RBC AUTO: 97 FL (ref 82–98)
MONOCYTES # BLD AUTO: 0.2 K/UL (ref 0.3–1)
MONOCYTES NFR BLD: 2.1 % (ref 4–15)
NEUTROPHILS # BLD AUTO: 10.3 K/UL (ref 1.8–7.7)
NEUTROPHILS NFR BLD: 92 % (ref 38–73)
NRBC BLD-RTO: 0 /100 WBC
PLATELET # BLD AUTO: 419 K/UL (ref 150–450)
PMV BLD AUTO: 9.5 FL (ref 9.2–12.9)
POTASSIUM SERPL-SCNC: 4 MMOL/L (ref 3.5–5.1)
PROT SERPL-MCNC: 6.4 G/DL (ref 6–8.4)
RBC # BLD AUTO: 3.52 M/UL (ref 4.6–6.2)
SODIUM SERPL-SCNC: 139 MMOL/L (ref 136–145)
WBC # BLD AUTO: 11.24 K/UL (ref 3.9–12.7)

## 2022-06-14 PROCEDURE — 63600175 PHARM REV CODE 636 W HCPCS: Performed by: INTERNAL MEDICINE

## 2022-06-14 PROCEDURE — 80053 COMPREHEN METABOLIC PANEL: CPT | Performed by: STUDENT IN AN ORGANIZED HEALTH CARE EDUCATION/TRAINING PROGRAM

## 2022-06-14 PROCEDURE — 96413 CHEMO IV INFUSION 1 HR: CPT

## 2022-06-14 PROCEDURE — 96374 THER/PROPH/DIAG INJ IV PUSH: CPT | Mod: 59

## 2022-06-14 PROCEDURE — A4216 STERILE WATER/SALINE, 10 ML: HCPCS | Performed by: INTERNAL MEDICINE

## 2022-06-14 PROCEDURE — 85025 COMPLETE CBC W/AUTO DIFF WBC: CPT | Performed by: STUDENT IN AN ORGANIZED HEALTH CARE EDUCATION/TRAINING PROGRAM

## 2022-06-14 PROCEDURE — 36591 DRAW BLOOD OFF VENOUS DEVICE: CPT

## 2022-06-14 PROCEDURE — 25000003 PHARM REV CODE 250: Performed by: INTERNAL MEDICINE

## 2022-06-14 RX ORDER — ONDANSETRON 2 MG/ML
8 INJECTION INTRAMUSCULAR; INTRAVENOUS
Status: CANCELLED | OUTPATIENT
Start: 2022-06-28

## 2022-06-14 RX ORDER — ONDANSETRON 2 MG/ML
8 INJECTION INTRAMUSCULAR; INTRAVENOUS
Status: CANCELLED | OUTPATIENT
Start: 2022-06-14

## 2022-06-14 RX ORDER — ONDANSETRON 2 MG/ML
8 INJECTION INTRAMUSCULAR; INTRAVENOUS
Status: COMPLETED | OUTPATIENT
Start: 2022-06-14 | End: 2022-06-14

## 2022-06-14 RX ORDER — SODIUM CHLORIDE 0.9 % (FLUSH) 0.9 %
10 SYRINGE (ML) INJECTION
Status: CANCELLED | OUTPATIENT
Start: 2022-06-14

## 2022-06-14 RX ORDER — SODIUM CHLORIDE 0.9 % (FLUSH) 0.9 %
10 SYRINGE (ML) INJECTION
Status: DISCONTINUED | OUTPATIENT
Start: 2022-06-14 | End: 2022-06-14 | Stop reason: HOSPADM

## 2022-06-14 RX ORDER — HEPARIN 100 UNIT/ML
500 SYRINGE INTRAVENOUS
Status: CANCELLED | OUTPATIENT
Start: 2022-06-28

## 2022-06-14 RX ORDER — HEPARIN 100 UNIT/ML
500 SYRINGE INTRAVENOUS
Status: DISCONTINUED | OUTPATIENT
Start: 2022-06-14 | End: 2022-06-14 | Stop reason: HOSPADM

## 2022-06-14 RX ORDER — SODIUM CHLORIDE 0.9 % (FLUSH) 0.9 %
10 SYRINGE (ML) INJECTION
Status: CANCELLED | OUTPATIENT
Start: 2022-06-28

## 2022-06-14 RX ORDER — HEPARIN 100 UNIT/ML
500 SYRINGE INTRAVENOUS
Status: CANCELLED | OUTPATIENT
Start: 2022-06-14

## 2022-06-14 RX ADMIN — ONDANSETRON 8 MG: 2 INJECTION INTRAMUSCULAR; INTRAVENOUS at 03:06

## 2022-06-14 RX ADMIN — GEMCITABINE HYDROCHLORIDE 2210 MG: 2 INJECTION, SOLUTION INTRAVENOUS at 03:06

## 2022-06-14 RX ADMIN — Medication 10 ML: at 04:06

## 2022-06-14 RX ADMIN — HEPARIN 500 UNITS: 100 SYRINGE at 04:06

## 2022-06-14 NOTE — PLAN OF CARE
Pt arrived to unit for stat labs and Gemzar. Pt was just in the hospital again overnight due to SOB. Pt feeling better overall today. Endorsing generalized fatigue and weakness. VSS. Labs drawn and reviewed. Pt okay to proceed with tx today. Received pre-med of Zofran IVP. Gemzar given over 30 minutes. No complaints voiced. Pt received discharge instructions along with next appointments. Left unit in wheelchair accompanied by spouse in NAD at time of discharge.

## 2022-06-16 ENCOUNTER — OFFICE VISIT (OUTPATIENT)
Dept: FAMILY MEDICINE | Facility: CLINIC | Age: 62
End: 2022-06-16
Payer: MEDICARE

## 2022-06-16 VITALS
BODY MASS INDEX: 30.02 KG/M2 | RESPIRATION RATE: 16 BRPM | OXYGEN SATURATION: 96 % | TEMPERATURE: 98 F | HEIGHT: 70 IN | WEIGHT: 209.69 LBS | HEART RATE: 92 BPM

## 2022-06-16 DIAGNOSIS — J96.11 CHRONIC RESPIRATORY FAILURE WITH HYPOXIA: Primary | ICD-10-CM

## 2022-06-16 DIAGNOSIS — C34.02 LUNG CANCER, MAIN BRONCHUS, LEFT: Chronic | ICD-10-CM

## 2022-06-16 DIAGNOSIS — I50.42 CHRONIC COMBINED SYSTOLIC AND DIASTOLIC HEART FAILURE: Chronic | ICD-10-CM

## 2022-06-16 PROBLEM — Z99.81 DEPENDENCE ON SUPPLEMENTAL OXYGEN: Status: ACTIVE | Noted: 2022-05-24

## 2022-06-16 PROCEDURE — 99999 PR PBB SHADOW E&M-EST. PATIENT-LVL III: ICD-10-PCS | Mod: PBBFAC,,, | Performed by: NURSE PRACTITIONER

## 2022-06-16 PROCEDURE — 99214 PR OFFICE/OUTPT VISIT, EST, LEVL IV, 30-39 MIN: ICD-10-PCS | Mod: S$PBB,,, | Performed by: NURSE PRACTITIONER

## 2022-06-16 PROCEDURE — 99999 PR PBB SHADOW E&M-EST. PATIENT-LVL III: CPT | Mod: PBBFAC,,, | Performed by: NURSE PRACTITIONER

## 2022-06-16 PROCEDURE — 99214 OFFICE O/P EST MOD 30 MIN: CPT | Mod: S$PBB,,, | Performed by: NURSE PRACTITIONER

## 2022-06-16 PROCEDURE — 99213 OFFICE O/P EST LOW 20 MIN: CPT | Mod: PBBFAC,PO | Performed by: NURSE PRACTITIONER

## 2022-06-16 NOTE — PROGRESS NOTES
Subjective:      Patient ID: Kashif Iverson is a 61 y.o. male.  Pt presents to clinic for hospital f/u, see course below. He was admitted for COPD and CHF exacerbation with acute respiratory distress. Was d/c home with oxygen though has not used it. Home Sats 92-88%. Denies any CP or SOB, appetite good and having good output. Denies any new complaints today.       Admission Date: 6/10/2022  Hospital Length of Stay: 0 days  Discharge Date and Time:  06/11/2022 12:03 PM    Hospital Course:   61 y.o. male with stage IV lung cancer, bronchiectasis, chronic combined heart failure, HTN, CAD, and immunodeficiency due to chemotherapy presents with a complaint of shortness of breath.  He is chronically short of breath but became acutely severe early this morning, associated with low saturations on home pulse ox.  Uses home oxygen p.r.n. 2 L as needed but does not wear it very often at baseline.  No known exacerbating or alleviating factors, associated with lower extremity edema, attempted self treatment with home oxygen turned up to 3-4 L with some improvement.  Denies fever, chills, chest pain, palpitations, dizziness, syncope, nausea, vomiting, diarrhea, abdominal pain, bloody or black stools, or dysuria.  In the ED, no fever or leukocytosis, chest imaging with only possible mild pulmonary edema, CTA chest negative for PE, BNP only 206. He requires supplemental oxygen at 4 L nasal cannula to maintain oxygen saturations of 91 92% which is acutely worse than his baseline.  Corticosteroids, nebulizer treatments, and Lasix were initiated.  Placed in observation.    The patient was admitted to the hospital for acute respiratory failure with hypoxia due to COPD exacerbation and CHF exacerbation.  Patient does have history of stage IV lung cancer.  Patient was placed on COPD order set, given IV Lasix, DuoNebs, Solu-Medrol, low flow oxygen , and antibiotics.  Patient reports he improved overnight.  Feels a lot better today.  He is  hoping to be discharged home.  The patient is unsure if he has Lasix at home.  Did discuss refilling his prescription for Lasix 40 mg p.o. b.i.d..  He will continue the other medications from home.  Also given prednisone 40 mg daily for 5 days, cefdinir 300 mg p.o. b.i.d. for 5 days.  Patient will follow this PCP in 1 week.  Follow up in Oncology Clinic in 1-2 weeks or as scheduled.  The patient says he has oxygen at home that he uses as needed.  The patient if he develops chest pain, shortness of breath, nausea, vomiting, or abdominal pain type of symptoms he should go to the local ER.  He verbalized understanding.     Consults:      Coronary artery disease involving native coronary artery of native heart without angina pectoris  Continue home regimen of ASA/cilostazol/plavix/statin/BBl/ARB     Chronic combined systolic and diastolic heart failure  Possibly some element of acute on chronic  -continue Lasix 40 mg p.o. b.i.d. at home, Coreg 6.25 mg p.o. b.i.d., losartan 100 mg p.o. q.day.  - daily weights.  -low-sodium diet     Essential hypertension  Well controlled, continue home medications and monitor blood pressure, adjust as needed.      Immunodeficiency due to drugs  On chemo     Lung cancer, main bronchus, left  Follows with Dr. Hathaway    Review of Systems   Constitutional: Negative for activity change, appetite change, chills, diaphoresis, fatigue, fever and unexpected weight change.   HENT: Negative for nasal congestion, ear pain, postnasal drip, rhinorrhea, sneezing, sore throat and voice change.    Eyes: Negative for pain and visual disturbance.   Respiratory: Negative for cough, chest tightness, shortness of breath and wheezing.    Cardiovascular: Positive for leg swelling (improving, L>R). Negative for chest pain, palpitations and claudication.   Gastrointestinal: Negative for abdominal pain, change in bowel habit, constipation, diarrhea, nausea, vomiting and change in bowel habit.   Endocrine: Negative.   "  Genitourinary: Negative for difficulty urinating.   Musculoskeletal: Negative for arthralgias, gait problem and myalgias.   Integumentary:  Negative for rash.   Allergic/Immunologic: Negative.    Neurological: Negative for speech difficulty and headaches.   Hematological: Negative.    Psychiatric/Behavioral: Negative.    All other systems reviewed and are negative.        Objective:     Vitals:    22 1351   Pulse: 92   Resp: 16   Temp: 98.4 °F (36.9 °C)   TempSrc: Oral   SpO2: 96%   Weight: 95.1 kg (209 lb 10.5 oz)   Height: 5' 10" (1.778 m)     Physical Exam  Vitals and nursing note reviewed.   Constitutional:       General: He is not in acute distress.     Appearance: Normal appearance. He is well-developed, well-groomed and overweight. He is not ill-appearing.   HENT:      Head: Normocephalic and atraumatic.      Right Ear: External ear normal.      Left Ear: External ear normal.      Nose: Nose normal.      Mouth/Throat:      Lips: Pink.      Mouth: Mucous membranes are moist.   Eyes:      General: Lids are normal. Vision grossly intact. Gaze aligned appropriately.      Conjunctiva/sclera: Conjunctivae normal.      Pupils: Pupils are equal, round, and reactive to light.   Neck:      Trachea: Phonation normal.   Cardiovascular:      Rate and Rhythm: Normal rate and regular rhythm.      Heart sounds: Normal heart sounds.   Pulmonary:      Effort: Pulmonary effort is normal. No accessory muscle usage or respiratory distress.      Breath sounds: Normal breath sounds and air entry. No decreased breath sounds, wheezing, rhonchi or rales.   Abdominal:      General: Abdomen is flat. Bowel sounds are normal. There is no distension.      Palpations: Abdomen is soft.      Tenderness: There is no abdominal tenderness.   Musculoskeletal:      Cervical back: Neck supple.      Right lower le+ Edema present.      Left lower le+ Edema present.   Skin:     General: Skin is warm and dry.      Findings: No rash. "   Neurological:      General: No focal deficit present.      Mental Status: He is alert and oriented to person, place, and time. Mental status is at baseline.      Cranial Nerves: Cranial nerves are intact.      Sensory: Sensation is intact.      Motor: Motor function is intact.      Coordination: Coordination is intact.      Gait: Gait is intact.   Psychiatric:         Attention and Perception: Attention and perception normal.         Mood and Affect: Mood and affect normal.         Speech: Speech normal.         Behavior: Behavior normal. Behavior is cooperative.         Thought Content: Thought content normal.         Cognition and Memory: Cognition and memory normal.         Judgment: Judgment normal.       Assessment and Plan:     1. Chronic respiratory failure with hypoxia  Sats 96% on RA in clinic today without any distress. Use oxygen as needed for decreased oxygen sats as instructed by pulmonology.      2. Chronic combined systolic and diastolic heart failure  No acute CV complaints, peripheral edema improving with lasix daily, f/u with cards as scheduled    3. Lung cancer, main bronchus, left  With bone mets, Last chemo infusion yesterday, no acute complaints today, has f/u with , hem/onc as directed           FABIENNE Lawler, FNP-C  Family/Internal Medicine  [facility]

## 2022-06-23 ENCOUNTER — INFUSION (OUTPATIENT)
Dept: INFUSION THERAPY | Facility: HOSPITAL | Age: 62
End: 2022-06-23
Attending: STUDENT IN AN ORGANIZED HEALTH CARE EDUCATION/TRAINING PROGRAM
Payer: MEDICARE

## 2022-06-23 ENCOUNTER — OFFICE VISIT (OUTPATIENT)
Dept: HEMATOLOGY/ONCOLOGY | Facility: CLINIC | Age: 62
End: 2022-06-23
Payer: MEDICARE

## 2022-06-23 VITALS
WEIGHT: 202.81 LBS | DIASTOLIC BLOOD PRESSURE: 81 MMHG | SYSTOLIC BLOOD PRESSURE: 145 MMHG | HEART RATE: 84 BPM | BODY MASS INDEX: 29.03 KG/M2 | OXYGEN SATURATION: 98 % | RESPIRATION RATE: 20 BRPM | HEIGHT: 70 IN

## 2022-06-23 DIAGNOSIS — C79.51 SECONDARY MALIGNANT NEOPLASM OF BONE: ICD-10-CM

## 2022-06-23 DIAGNOSIS — C34.02 LUNG CANCER, MAIN BRONCHUS, LEFT: Primary | ICD-10-CM

## 2022-06-23 DIAGNOSIS — C34.02 LUNG CANCER, MAIN BRONCHUS, LEFT: Primary | Chronic | ICD-10-CM

## 2022-06-23 DIAGNOSIS — I50.42 CHRONIC COMBINED SYSTOLIC AND DIASTOLIC HEART FAILURE: Chronic | ICD-10-CM

## 2022-06-23 LAB
ALBUMIN SERPL BCP-MCNC: 3.3 G/DL (ref 3.5–5.2)
ALP SERPL-CCNC: 89 U/L (ref 55–135)
ALT SERPL W/O P-5'-P-CCNC: 26 U/L (ref 10–44)
ANION GAP SERPL CALC-SCNC: 9 MMOL/L (ref 8–16)
AST SERPL-CCNC: 25 U/L (ref 10–40)
BASOPHILS # BLD AUTO: 0.04 K/UL (ref 0–0.2)
BASOPHILS NFR BLD: 0.7 % (ref 0–1.9)
BILIRUB SERPL-MCNC: 0.7 MG/DL (ref 0.1–1)
BUN SERPL-MCNC: 13 MG/DL (ref 8–23)
CALCIUM SERPL-MCNC: 8.8 MG/DL (ref 8.7–10.5)
CHLORIDE SERPL-SCNC: 103 MMOL/L (ref 95–110)
CO2 SERPL-SCNC: 28 MMOL/L (ref 23–29)
CREAT SERPL-MCNC: 0.9 MG/DL (ref 0.5–1.4)
DIFFERENTIAL METHOD: ABNORMAL
EOSINOPHIL # BLD AUTO: 0.1 K/UL (ref 0–0.5)
EOSINOPHIL NFR BLD: 1.3 % (ref 0–8)
ERYTHROCYTE [DISTWIDTH] IN BLOOD BY AUTOMATED COUNT: 15.6 % (ref 11.5–14.5)
EST. GFR  (AFRICAN AMERICAN): >60 ML/MIN/1.73 M^2
EST. GFR  (NON AFRICAN AMERICAN): >60 ML/MIN/1.73 M^2
GLUCOSE SERPL-MCNC: 124 MG/DL (ref 70–110)
HCT VFR BLD AUTO: 35.8 % (ref 40–54)
HGB BLD-MCNC: 11.9 G/DL (ref 14–18)
IMM GRANULOCYTES # BLD AUTO: 0.04 K/UL (ref 0–0.04)
IMM GRANULOCYTES NFR BLD AUTO: 0.7 % (ref 0–0.5)
LYMPHOCYTES # BLD AUTO: 1.2 K/UL (ref 1–4.8)
LYMPHOCYTES NFR BLD: 22.2 % (ref 18–48)
MCH RBC QN AUTO: 32.5 PG (ref 27–31)
MCHC RBC AUTO-ENTMCNC: 33.2 G/DL (ref 32–36)
MCV RBC AUTO: 98 FL (ref 82–98)
MONOCYTES # BLD AUTO: 0.7 K/UL (ref 0.3–1)
MONOCYTES NFR BLD: 12.7 % (ref 4–15)
NEUTROPHILS # BLD AUTO: 3.4 K/UL (ref 1.8–7.7)
NEUTROPHILS NFR BLD: 62.4 % (ref 38–73)
NRBC BLD-RTO: 0 /100 WBC
PLATELET # BLD AUTO: 250 K/UL (ref 150–450)
PMV BLD AUTO: 9.6 FL (ref 9.2–12.9)
POTASSIUM SERPL-SCNC: 3.1 MMOL/L (ref 3.5–5.1)
PROT SERPL-MCNC: 6.1 G/DL (ref 6–8.4)
RBC # BLD AUTO: 3.66 M/UL (ref 4.6–6.2)
SODIUM SERPL-SCNC: 140 MMOL/L (ref 136–145)
WBC # BLD AUTO: 5.5 K/UL (ref 3.9–12.7)

## 2022-06-23 PROCEDURE — 80053 COMPREHEN METABOLIC PANEL: CPT | Performed by: STUDENT IN AN ORGANIZED HEALTH CARE EDUCATION/TRAINING PROGRAM

## 2022-06-23 PROCEDURE — 99999 PR PBB SHADOW E&M-EST. PATIENT-LVL III: CPT | Mod: PBBFAC,,, | Performed by: STUDENT IN AN ORGANIZED HEALTH CARE EDUCATION/TRAINING PROGRAM

## 2022-06-23 PROCEDURE — 99215 PR OFFICE/OUTPT VISIT, EST, LEVL V, 40-54 MIN: ICD-10-PCS | Mod: S$PBB,,, | Performed by: STUDENT IN AN ORGANIZED HEALTH CARE EDUCATION/TRAINING PROGRAM

## 2022-06-23 PROCEDURE — 36591 DRAW BLOOD OFF VENOUS DEVICE: CPT

## 2022-06-23 PROCEDURE — 85025 COMPLETE CBC W/AUTO DIFF WBC: CPT | Performed by: STUDENT IN AN ORGANIZED HEALTH CARE EDUCATION/TRAINING PROGRAM

## 2022-06-23 PROCEDURE — 99215 OFFICE O/P EST HI 40 MIN: CPT | Mod: S$PBB,,, | Performed by: STUDENT IN AN ORGANIZED HEALTH CARE EDUCATION/TRAINING PROGRAM

## 2022-06-23 PROCEDURE — 99213 OFFICE O/P EST LOW 20 MIN: CPT | Mod: PBBFAC | Performed by: STUDENT IN AN ORGANIZED HEALTH CARE EDUCATION/TRAINING PROGRAM

## 2022-06-23 PROCEDURE — 99999 PR PBB SHADOW E&M-EST. PATIENT-LVL III: ICD-10-PCS | Mod: PBBFAC,,, | Performed by: STUDENT IN AN ORGANIZED HEALTH CARE EDUCATION/TRAINING PROGRAM

## 2022-06-23 PROCEDURE — 63600175 PHARM REV CODE 636 W HCPCS: Performed by: STUDENT IN AN ORGANIZED HEALTH CARE EDUCATION/TRAINING PROGRAM

## 2022-06-23 PROCEDURE — 25000003 PHARM REV CODE 250: Performed by: STUDENT IN AN ORGANIZED HEALTH CARE EDUCATION/TRAINING PROGRAM

## 2022-06-23 PROCEDURE — A4216 STERILE WATER/SALINE, 10 ML: HCPCS | Performed by: STUDENT IN AN ORGANIZED HEALTH CARE EDUCATION/TRAINING PROGRAM

## 2022-06-23 RX ORDER — HEPARIN 100 UNIT/ML
500 SYRINGE INTRAVENOUS
Status: DISCONTINUED | OUTPATIENT
Start: 2022-06-23 | End: 2022-06-23 | Stop reason: HOSPADM

## 2022-06-23 RX ORDER — HEPARIN 100 UNIT/ML
500 SYRINGE INTRAVENOUS
Status: CANCELLED | OUTPATIENT
Start: 2022-06-23

## 2022-06-23 RX ORDER — SODIUM CHLORIDE 0.9 % (FLUSH) 0.9 %
10 SYRINGE (ML) INJECTION
Status: DISCONTINUED | OUTPATIENT
Start: 2022-06-23 | End: 2022-06-23 | Stop reason: HOSPADM

## 2022-06-23 RX ORDER — SODIUM CHLORIDE 0.9 % (FLUSH) 0.9 %
10 SYRINGE (ML) INJECTION
Status: CANCELLED | OUTPATIENT
Start: 2022-06-23

## 2022-06-23 RX ADMIN — Medication 10 ML: at 08:06

## 2022-06-23 RX ADMIN — HEPARIN 500 UNITS: 100 SYRINGE at 08:06

## 2022-06-23 NOTE — ASSESSMENT & PLAN NOTE
6/2022 CTA chest reviewed; overall stable compared to 5/2022 restaging scans.  Known treatment interruption between 2/2022 and 5/2022.  No new symptoms to report today. He feels well since hospital discharge for heart failure. No signs of fluid overload and weight has declined since his last visit.  -labs reviewed; ok to proceed with treatment  -continue with q2w labs and follow up in 1 month.  -At the end of August, will transition care to new oncologist.

## 2022-06-23 NOTE — Clinical Note
When to follow up: 7/22 Labs needed: 7/11, 7/22 CBC and Comprehensive Metabolic Panel  Chemotherapy Regimen:  Next Treatment Date Upcoming Treatment Dates - OP NSCLC GEMCITABINE Q2W 6/28/2022      gemcitabine (GEMZAR) 2,210 mg in sodium chloride 0.9% 250 mL chemo infusion 7/12/2022      gemcitabine (GEMZAR) 2,210 mg in sodium chloride 0.9% 250 mL chemo infusion 7/26/2022      gemcitabine (GEMZAR) 2,210 mg in sodium chloride 0.9% 250 mL chemo infusion  Provider: Rivas

## 2022-06-23 NOTE — NURSING
Patient's PAC accessed for lab draw. Line flushed, heparinized, and needle removed. Patient tolerated procedure well.  Specimens sent to lab.

## 2022-06-23 NOTE — ASSESSMENT & PLAN NOTE
6/10/22 admitted to observation for symptoms of acute on chronic heart failure that responded to diuresis.   -follow up with cardiology  -monitor weight

## 2022-06-23 NOTE — PROGRESS NOTES
"  PATIENT: Kashif Iverson  MRN: [id number]  DATE: 6/23/2022      Diagnosis:   1. Lung cancer, main bronchus, left    2. Chronic combined systolic and diastolic heart failure    3. Secondary malignant neoplasm of bone        Chief Complaint: Lung cancer, main bronchus, left      Oncologic History:    Oncologic History 1. 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 Palliative chemoradiation with carboplatin and paclitaxel; anaphylactic reaction to paclitaxel  2. Pembrolizumab  3. Pembrolizumab+ramucirumab (LungMAP trial)  4. Gemcitabine    Pathology Squamous cell carcinoma, no actionable mutations, PD-L1 5%        Subjective:    Interval History: Mr. Iverson returns for follow up.     8/30/2019 underwent bronchoscopy that showed "A partially obstructing (about 80% obstructed) mass was found in the middle portion in the left mainstem bronchus. The mass was large and bloody, circumferential, endobronchial, friable and necrotic. 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  No actionable mutations and PD-L1 5%.  9/19/21 staging PET CT showed "Hypermetabolic left lung mass concerning for primary pulmonary malignancy with metastatic disease involving the right 6th and 9th ribs as well as mediastinal and left supraclavicular lymph nodes."  Stage IV squamous cell carcinoma of the lung at diagnosis.    10/8/2019-10/21/2019 he received palliative chemoradiation for rib pain with carboplatin and paclitaxel.  He received 3 cycles of carboplatin and paclitaxel.  He developed anaphylactic reaction to paclitaxel.  The chest was treated with AP:PA fields and the right 9th rib was treated with anterior and posterior oblique fields at 300 cGy per fraction to 3000 cGy.   Lt chest 6X 300 10 / 10 3,000   Rt 9th rib 6X 300 10 / 10 3,000     10/31/2019-9/10/2020 he received pembrolizumab (completed 15 cycles, last dose 8/21/2020)  " 9/10/2020 PET CT showed progression of disease.  He was then referred to Dr. Key for evaluation for clinical trials.  10/20/2020 he underwent repeat biopsy for LUNG MAP trial and was randomized to Ramucirumab + pembrolizumab.    11/17/2020 started ramucirumab+pembrolizumab.  Completed 4 cycles and then 2/10/2021 scans showed progression.    2/24/2021 he was subsequent started on gemcitabine with Dr. Cruz.  His care was transferred to Dr. Romo who continued his current regimen and now is currently under my care.   5/17/22-5/22/22 admitted for acute respiratory failure secondary to heart failure exacerbation requiring intubation.  He was extubated and diuresed successfully.  --  6/10/22 Again had heart failure exacerbation but fortunately only required brief hospital observation and diuresis before being discharged the following day.  No recurrent symptoms since discharge and no new symptoms to report today.    He feels he is back at baseline. He does have RLE claudication but his wife says he has been very active and appears to be at his baseline.  He hasn't tried mowing his lawn yet, which he stopped because of the RLE claudication.    No new symptoms to report otherwise.    Wife accompanies him at this visit.    Past Medical History:   Past Medical History:   Diagnosis Date    Combined systolic and diastolic heart failure     Hypertension     Lung cancer     NSCLC    Lung mass     left lung       Past Surgical HIstory:   Past Surgical History:   Procedure Laterality Date    BRONCHOSCOPY N/A 8/30/2019    Procedure: Bronchoscopy;  Surgeon: Miguel Diagnostic Provider;  Location: [facility] OR [address] [place] [address];  Service: Anesthesiology;  Laterality: N/A;    CORONARY ANGIOGRAPHY N/A 3/4/2022    Procedure: ANGIOGRAM, CORONARY ARTERY;  Surgeon: Robson Green MD;  Location: [facility] CATH LAB;  Service: Cardiology;  Laterality: N/A;       Family History:   Family History   Problem Relation Age of Onset    Diabetes Mother      Heart defect Mother     Cancer Father     Cancer Sister     No Known Problems Son        Social History:  reports that he quit smoking about 2 years ago. His smoking use included cigarettes. He has a 25.00 pack-year smoking history. He has never used smokeless tobacco. He reports current alcohol use. He reports that he does not use drugs.    Allergies:  Review of patient's allergies indicates:  No Known Allergies    Medications:  Current Outpatient Medications   Medication Sig Dispense Refill    albuterol (VENTOLIN HFA) 90 mcg/actuation inhaler Inhale 2 puffs into the lungs every 6 (six) hours as needed for Wheezing. Rescue 18 g 11    amLODIPine (NORVASC) 10 MG tablet Take 1 tablet (10 mg total) by mouth once daily. 30 tablet 11    ascorbic acid-multivit-min (VITAMIN C ENERGY BOOSTER) 1,000 mg PwEP Take by mouth. Patient takes 3,000 mg per day      aspirin 81 MG Chew Take 1 tablet (81 mg total) by mouth once daily. 30 tablet 11    atorvastatin (LIPITOR) 80 MG tablet Take 1 tablet (80 mg total) by mouth once daily. 90 tablet 3    carvediloL (COREG) 6.25 MG tablet Take 1 tablet (6.25 mg total) by mouth 2 (two) times daily. 60 tablet 11    cilostazoL (PLETAL) 100 MG Tab Take 1 tablet (100 mg total) by mouth 2 (two) times daily. 60 tablet 11    clopidogreL (PLAVIX) 75 mg tablet Take 1 tablet (75 mg total) by mouth once daily. 30 tablet 11    furosemide (LASIX) 40 MG tablet Take 1 tablet (40 mg total) by mouth 2 (two) times a day. 60 tablet 2    losartan (COZAAR) 100 MG tablet Take 1 tablet (100 mg total) by mouth once daily. 90 tablet 3     No current facility-administered medications for this visit.     Facility-Administered Medications Ordered in Other Visits   Medication Dose Route Frequency Provider Last Rate Last Admin    heparin, porcine (PF) 100 unit/mL injection flush 500 Units  500 Units Intravenous PRN Mario Hathaway MD   500 Units at 06/23/22 0851    sodium chloride 0.9% flush 10 mL  10 mL  "Intravenous PRN Mario Hathaway MD   10 mL at 06/23/22 0852       Review of Systems   Constitutional: Negative for activity change, appetite change, chills, diaphoresis, fatigue, fever and unexpected weight change.   HENT: Negative for nosebleeds and trouble swallowing.    Eyes: Negative for visual disturbance.   Respiratory: Negative for cough, chest tightness, shortness of breath and wheezing.    Cardiovascular: Negative for chest pain and leg swelling.   Gastrointestinal: Negative for abdominal distention, abdominal pain, blood in stool, constipation, diarrhea, nausea and vomiting.   Endocrine: Negative for cold intolerance and heat intolerance.   Genitourinary: Negative for difficulty urinating and dysuria.   Musculoskeletal: Negative for arthralgias, back pain and myalgias.   Skin: Negative for color change.   Neurological: Negative for dizziness, weakness, light-headedness, numbness and headaches.   Hematological: Negative for adenopathy. Bruises/bleeds easily.   Psychiatric/Behavioral: Negative for confusion.       ECOG Performance Status:     ECOG SCORE    1 - Restricted in strenuous activity-ambulatory and able to carry out work of a light nature         Objective:      Vitals:   Vitals:    06/23/22 0907   BP: (!) 145/81   BP Location: Left arm   Patient Position: Sitting   BP Method: Medium (Automatic)   Pulse: 84   Resp: 20   SpO2: 98%   Weight: 92 kg (202 lb 13.2 oz)   Height: 5' 10" (1.778 m)     BMI: Body mass index is 29.1 kg/m².    Physical Exam  Constitutional:       General: He is not in acute distress.     Appearance: Normal appearance. He is not ill-appearing.   HENT:      Head: Normocephalic and atraumatic.      Nose: Nose normal.      Mouth/Throat:      Pharynx: No oropharyngeal exudate or posterior oropharyngeal erythema.   Eyes:      General: No scleral icterus.     Extraocular Movements: Extraocular movements intact.      Conjunctiva/sclera: Conjunctivae normal.      Pupils: Pupils are equal, " round, and reactive to light.   Cardiovascular:      Rate and Rhythm: Normal rate and regular rhythm.      Heart sounds: No murmur heard.    No friction rub. No gallop.      Comments: Right chest wall port c/d/i  Pulmonary:      Effort: Pulmonary effort is normal. No respiratory distress.      Breath sounds: No stridor. No wheezing, rhonchi or rales.   Abdominal:      General: Bowel sounds are normal. There is no distension.      Palpations: Abdomen is soft. There is no mass.      Tenderness: There is no abdominal tenderness. There is no guarding or rebound.   Musculoskeletal:         General: No swelling or tenderness. Normal range of motion.      Cervical back: Normal range of motion and neck supple.      Right lower leg: No edema.      Left lower leg: No edema.   Skin:     General: Skin is warm and dry.      Findings: Bruising present.   Neurological:      General: No focal deficit present.      Mental Status: He is alert.         Laboratory Data:   Recent Results (from the past 168 hour(s))   CBC Auto Differential    Collection Time: 06/23/22  8:47 AM   Result Value Ref Range    WBC 5.50 3.90 - 12.70 K/uL    RBC 3.66 (L) 4.60 - 6.20 M/uL    Hemoglobin 11.9 (L) 14.0 - 18.0 g/dL    Hematocrit 35.8 (L) 40.0 - 54.0 %    MCV 98 82 - 98 fL    MCH 32.5 (H) 27.0 - 31.0 pg    MCHC 33.2 32.0 - 36.0 g/dL    RDW 15.6 (H) 11.5 - 14.5 %    Platelets 250 150 - 450 K/uL    MPV 9.6 9.2 - 12.9 fL    Immature Granulocytes 0.7 (H) 0.0 - 0.5 %    Gran # (ANC) 3.4 1.8 - 7.7 K/uL    Immature Grans (Abs) 0.04 0.00 - 0.04 K/uL    Lymph # 1.2 1.0 - 4.8 K/uL    Mono # 0.7 0.3 - 1.0 K/uL    Eos # 0.1 0.0 - 0.5 K/uL    Baso # 0.04 0.00 - 0.20 K/uL    nRBC 0 0 /100 WBC    Gran % 62.4 38.0 - 73.0 %    Lymph % 22.2 18.0 - 48.0 %    Mono % 12.7 4.0 - 15.0 %    Eosinophil % 1.3 0.0 - 8.0 %    Basophil % 0.7 0.0 - 1.9 %    Differential Method Automated    Comprehensive Metabolic Panel    Collection Time: 06/23/22  8:47 AM   Result Value Ref Range     Sodium 140 136 - 145 mmol/L    Potassium 3.1 (L) 3.5 - 5.1 mmol/L    Chloride 103 95 - 110 mmol/L    CO2 28 23 - 29 mmol/L    Glucose 124 (H) 70 - 110 mg/dL    BUN 13 8 - 23 mg/dL    Creatinine 0.9 0.5 - 1.4 mg/dL    Calcium 8.8 8.7 - 10.5 mg/dL    Total Protein 6.1 6.0 - 8.4 g/dL    Albumin 3.3 (L) 3.5 - 5.2 g/dL    Total Bilirubin 0.7 0.1 - 1.0 mg/dL    Alkaline Phosphatase 89 55 - 135 U/L    AST 25 10 - 40 U/L    ALT 26 10 - 44 U/L    Anion Gap 9 8 - 16 mmol/L    eGFR if African American >60.0 >60 mL/min/1.73 m^2    eGFR if non African American >60.0 >60 mL/min/1.73 m^2         Imaging:     CTA Chest Non-Coronary - PE Study    Result Date: 6/10/2022  EXAMINATION: CTA CHEST NON CORONARY CLINICAL HISTORY: Pulmonary embolism (PE) suspected, high prob; TECHNIQUE: Low dose axial images, sagittal and coronal reformations were obtained from the thoracic inlet to the lung bases following the IV administration of 75 mL of Omnipaque 350.  Contrast timing was optimized to evaluate the pulmonary arteries.  Maximum intensity projection images were provided for review. COMPARISON: CT of the chest, abdomen, and pelvis from 05/26/2022. FINDINGS: Pulmonary vasculature: Limited evaluation secondary to poor contrast bolus timing and motion artifact.  There is no evidence of a filling defect within the central, lobar, or proximal segmental pulmonary arteries.  Distal segmental and subsegmental pulmonary arteries are not well evaluated. Aorta: Left-sided aortic arch.  No aneurysm.  Mild atherosclerosis. Base of Neck: No significant abnormality. Thoracic soft tissues: There is a right anterior chest wall port. Heart: Normal size. No effusion. Leonarda/Mediastinum: No pathologic ciro enlargement. Airways: There is continued complete occlusion of the left upper lobe bronchus.  Scattered bronchial wall thickening and mucous plugging is noted in the lingula such as series 2, image 215).  Diffuse bronchial wall thickening is also seen  elsewhere, suspicious for pulmonary edema. Lungs/Pleura: Again seen is irregular bandlike soft tissue in the left upper lobe abutting the mediastinal pleura with adjacent pleural thickening, unchanged in size and configuration from prior.  There is a wedge-shaped opacity in the superior segment of the left lower lobe, also unchanged.  There are additional bandlike opacities throughout the left lung with there is volume loss, traction bronchiectasis, and architectural distortion compatible with radiation changes.  Again seen is a subpleural nodule in the paramediastinal left upper lobe measuring approximately 1.8 by 1.2 cm (series 2, image 138), unchanged in size from prior but increased in size from 02/22/2022.  Unchanged 5 mm pulmonary nodule in the left lower lobe (series 2, image 35).  Additional scattered micro nodules are seen within the bilateral lungs, stable.  No new pulmonary nodules are seen. There are moderate right and small left pleural effusions.  There is passive atelectasis in the right lower lobe posteriorly.  There is interlobular septal thickening.  There are background moderate centrilobular emphysematous changes. Esophagus: Normal. Upper Abdomen: No abnormality of the partially imaged upper abdomen. Bones: No acute fracture. No suspicious lytic or sclerotic lesions.     1. Limited examination, no pulmonary embolism to the proximal segmental level. 2. Interlobular septal thickening and diffuse bronchial wall thickening suspicious for interstitial pulmonary edema. 3. Redemonstration of a pleural based nodule in the left upper lobe suspicious for local recurrence or pleural/pulmonary metastasis, unchanged in size and appearance from prior. 4. Moderate right and small left pleural effusions. 5. Radiation changes in the left upper lung. Electronically signed by: Arjun Dejesus Date:    06/10/2022 Time:    16:30    X-Ray Chest AP Portable    Result Date: 6/10/2022  EXAMINATION: XR CHEST AP PORTABLE  CLINICAL HISTORY: CHF; TECHNIQUE: Single frontal view of the chest was performed. COMPARISON: 05/19/2022 FINDINGS: Right-sided chest port remains in place with the catheter tip probably in the right atrium.  Cardiomediastinal silhouette is unchanged.  Pulmonary vascularity appears stable.  Somewhat poor inspiration.  Continued interstitial and airspace opacities bilaterally with perihilar opacities, particularly on the left.  Continued blunting of both costophrenic angles with some pleural fluid or thickening along the lateral aspect of the left hemithorax.  No pneumothorax or other detrimental change.  Skeletal structures appear intact.     No significant interval change Electronically signed by: Montrell Hsu MD Date:    06/10/2022 Time:    14:19    US Lower Extremity Veins Left    Result Date: 6/10/2022  EXAMINATION: US LOWER EXTREMITY VEINS LEFT CLINICAL HISTORY: Other specified soft tissue disorders TECHNIQUE: Duplex and color flow Doppler evaluation and graded compression of the left lower extremity veins was performed. COMPARISON: 10/15/2021 FINDINGS: Duplex and color flow Doppler evaluation does not reveal any evidence of acute venous thrombosis in the common femoral, superficial femoral, greater saphenous, popliteal, peroneal, anterior tibial and posterior tibial veins of the left lower extremity.  There is no reflux to suggest valvular incompetence.  There is left lower extremity subcutaneous edema.     No evidence of deep venous thrombosis in the left lower extremity. Electronically signed by: Jonathan Mclaughlin MD Date:    06/10/2022 Time:    16:31    CT Chest Abdomen Pelvis Without Contrast (XPD)    Result Date: 5/26/2022  EXAMINATION: CT CHEST ABDOMEN PELVIS WITHOUT CONTRAST(XPD) CLINICAL HISTORY: lung cancer on chemotherapy, surveillance scan; Malignant neoplasm of left main bronchus TECHNIQUE: Low dose axial images, sagittal and coronal reformations were obtained from the thoracic inlet to the pubic  symphysis following the oral administration of 450ml of Readi-Cat. COMPARISON: Multiple CTs of the chest, abdomen, and pelvis, most recent 05/17/2022 FINDINGS: LINES/TUBES:  Right chest wall Port-A-Cath terminates in the right atrium. SOFT TISSUES: No axillary or subpectoral lymphadenopathy. The visualized thyroid gland is unremarkable. HEART AND MEDIASTINUM: No mediastinal or hilar lymphadenopathy. Heart is normal in size.  Trace pericardial fluid.  Calcifications of the coronary arteries, thoracic aorta, and arch vessels.  The main pulmonary artery is not enlarged. PLEURA: Previous pleural effusions have resolved.  No pneumothorax. LUNGS AND AIRWAYS: Occlusion of the left upper lobe bronchus centrally.  Distally, there is bronchial wall thickening and scattered mucous plugging, for example in the lingula on 4:220. Upper lobe predominant centrilobular emphysema.  There is irregular band like soft tissue in the left upper lobe abutting the mediastinum and pleura with adjacent pleural thickening (4:176 and 601:84), unchanged in size and configuration.  There is a wedge-shaped opacity in the superior left lower lobe (4:159), also unchanged.  Additional bandlike opacities throughout the left lung, with areas of volume loss, traction bronchiectasis, and architectural distortion. There is a 1.7 x 1.1 cm solid subpleural nodule in the paramediastinal left upper lobe (4:133), unchanged from 05/17/2022 but increased in size from 1.1 x 0.8 cm on 02/22/2022.  Unchanged 0.5 cm solid nodule in the left lower lobe (4:303).  There are numerous micro nodules scattered throughout the lungs bilaterally, for example in the right upper lobe on 4:88; it is difficult to tell if all of these were these were present on prior examinations. HEPATOBILIARY: No focal hepatic lesions. No biliary ductal dilatation. Normal gallbladder. SPLEEN: No splenomegaly. PANCREAS: Punctate calcifications throughout the pancreas, which could be secondary to  "chronic pancreatitis.  No focal mass or ductal dilatation. ADRENALS: No adrenal nodules. KIDNEYS/URETERS: Punctate nonobstructing stone in the right lower pole.  No hydronephrosis.  No definite solid mass. PELVIC ORGANS/BLADDER: Bladder is unremarkable.  Coarse prostatic calcifications. PERITONEUM / RETROPERITONEUM: No free air or fluid. LYMPH NODES: No lymphadenopathy. VESSELS: Calcifications of the abdominal aorta and its major branches.  Infrarenal abdominal aortic ectasia measuring up to the 2.8 cm. GI TRACT: No distention or wall thickening. Normal appendix. BONES: No acute fractures or focal osseous lesions.  Degenerative changes.     Posttreatment changes in the left upper lobe for lung cancer. Progressively enlarging 1.7 cm nodule in the left upper lobe, concerning for local recurrence or pleural/pulmonary metastasis. No mediastinal or hilar lymphadenopathy. No abdominopelvic metastases. Electronically signed by: Jun Ramirez Date:    05/26/2022 Time:    11:38        Assessment:       1. Lung cancer, main bronchus, left    2. Chronic combined systolic and diastolic heart failure    3. Secondary malignant neoplasm of bone           Plan:       Problem List Items Addressed This Visit        Cardiac/Vascular    Chronic combined systolic and diastolic heart failure (Chronic)    Current Assessment & Plan     6/10/22 admitted to observation for symptoms of acute on chronic heart failure that responded to diuresis.   -follow up with cardiology  -monitor weight              Oncology    Lung cancer, main bronchus, left - Primary (Chronic)    Overview     8/30/2019 underwent bronchoscopy that showed "A partially obstructing (about 80% obstructed) mass was found in the middle portion in the left mainstem bronchus. The mass was large and bloody, circumferential, endobronchial, friable and necrotic. 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  No " "actionable mutations and PD-L1 5%.  9/19/21 staging PET CT showed "Hypermetabolic left lung mass concerning for primary pulmonary malignancy with metastatic disease involving the right 6th and 9th ribs as well as mediastinal and left supraclavicular lymph nodes."  Stage IV squamous cell carcinoma of the lung at diagnosis.    10/8/2019-10/21/2019 he received palliative chemoradiation for rib pain with carboplatin and paclitaxel.  He received 3 cycles of carboplatin and paclitaxel.  He developed anaphylactic reaction to paclitaxel.  The chest was treated with AP:PA fields and the right 9th rib was treated with anterior and posterior oblique fields at 300 cGy per fraction to 3000 cGy.   Lt chest 6X 300 10 / 10 3,000   Rt 9th rib 6X 300 10 / 10 3,000     10/31/2019-9/10/2020 he received pembrolizumab (completed 15 cycles, last dose 8/21/2020)  9/10/2020 PET CT showed progression of disease.  He was then referred to Dr. Key for evaluation for clinical trials.  10/20/2020 he underwent repeat biopsy for LUNG MAP trial and was randomized to Ramucirumab + pembrolizumab.    11/17/2020 started ramucirumab+pembrolizumab.  Completed 4 cycles and then 2/10/2021 scans showed progression.    2/24/2021 he was subsequent started on gemcitabine with Dr. Cruz.           Current Assessment & Plan     6/2022 CTA chest reviewed; overall stable compared to 5/2022 restaging scans.  Known treatment interruption between 2/2022 and 5/2022.  No new symptoms to report today. He feels well since hospital discharge for heart failure. No signs of fluid overload and weight has declined since his last visit.  -labs reviewed; ok to proceed with treatment  -continue with q2w labs and follow up in 1 month.  -At the end of August, will transition care to new oncologist.           Secondary malignant neoplasm of bone          No orders of the defined types were placed in this encounter.          Mario Hathaway MD  Hematology Oncology            "

## 2022-06-27 ENCOUNTER — TELEPHONE (OUTPATIENT)
Dept: HEMATOLOGY/ONCOLOGY | Facility: CLINIC | Age: 62
End: 2022-06-27
Payer: MEDICARE

## 2022-06-27 NOTE — TELEPHONE ENCOUNTER
Patient wife called and stated that patient took extra doses of cilastozol, carvedilol, and furosemide on yesterday. He took 3 doses of carvedilol, he took 3 doses of cilastozaol, and 3 furosemide all at one time yesterday. Wife called poison control and they stated that he only had to worry about cilastozol. She has been checking his bp. She was advised not to take anymore meds until speaking with us. Please advise.

## 2022-06-27 NOTE — TELEPHONE ENCOUNTER
Patient wife called and stated that patient took extra doses of cilastozol, carvedilol, and furosemide on yesterday. He took 3 doses of carvedilol, he took 3 doses of cilastozaol, and 3 furosemide all at one time yesterday. Wife called poison control and they stated that he only had to worry about cilastozol. She has been checking his bp. She was advised not to take anymore meds until speaking with us. Please advise.    Advised to contact his cardiologist

## 2022-06-28 ENCOUNTER — DOCUMENT SCAN (OUTPATIENT)
Dept: HOME HEALTH SERVICES | Facility: HOSPITAL | Age: 62
End: 2022-06-28
Payer: MEDICARE

## 2022-06-28 ENCOUNTER — INFUSION (OUTPATIENT)
Dept: INFUSION THERAPY | Facility: HOSPITAL | Age: 62
End: 2022-06-28
Attending: STUDENT IN AN ORGANIZED HEALTH CARE EDUCATION/TRAINING PROGRAM
Payer: MEDICARE

## 2022-06-28 VITALS
RESPIRATION RATE: 16 BRPM | TEMPERATURE: 98 F | HEART RATE: 77 BPM | SYSTOLIC BLOOD PRESSURE: 122 MMHG | DIASTOLIC BLOOD PRESSURE: 68 MMHG | OXYGEN SATURATION: 98 %

## 2022-06-28 DIAGNOSIS — C34.02 LUNG CANCER, MAIN BRONCHUS, LEFT: Primary | ICD-10-CM

## 2022-06-28 DIAGNOSIS — R91.8 MULTIPLE PULMONARY NODULES: ICD-10-CM

## 2022-06-28 DIAGNOSIS — C79.51 SECONDARY MALIGNANT NEOPLASM OF BONE: ICD-10-CM

## 2022-06-28 LAB
ALBUMIN SERPL BCP-MCNC: 3.3 G/DL (ref 3.5–5.2)
ALP SERPL-CCNC: 83 U/L (ref 55–135)
ALT SERPL W/O P-5'-P-CCNC: 16 U/L (ref 10–44)
ANION GAP SERPL CALC-SCNC: 7 MMOL/L (ref 8–16)
AST SERPL-CCNC: 18 U/L (ref 10–40)
BASOPHILS # BLD AUTO: 0.07 K/UL (ref 0–0.2)
BASOPHILS NFR BLD: 1 % (ref 0–1.9)
BILIRUB SERPL-MCNC: 0.7 MG/DL (ref 0.1–1)
BUN SERPL-MCNC: 11 MG/DL (ref 8–23)
CALCIUM SERPL-MCNC: 8.7 MG/DL (ref 8.7–10.5)
CHLORIDE SERPL-SCNC: 105 MMOL/L (ref 95–110)
CO2 SERPL-SCNC: 28 MMOL/L (ref 23–29)
CREAT SERPL-MCNC: 0.9 MG/DL (ref 0.5–1.4)
DIFFERENTIAL METHOD: ABNORMAL
EOSINOPHIL # BLD AUTO: 0.1 K/UL (ref 0–0.5)
EOSINOPHIL NFR BLD: 1.1 % (ref 0–8)
ERYTHROCYTE [DISTWIDTH] IN BLOOD BY AUTOMATED COUNT: 15.9 % (ref 11.5–14.5)
EST. GFR  (AFRICAN AMERICAN): >60 ML/MIN/1.73 M^2
EST. GFR  (NON AFRICAN AMERICAN): >60 ML/MIN/1.73 M^2
GLUCOSE SERPL-MCNC: 139 MG/DL (ref 70–110)
HCT VFR BLD AUTO: 34.3 % (ref 40–54)
HGB BLD-MCNC: 11.3 G/DL (ref 14–18)
IMM GRANULOCYTES # BLD AUTO: 0.02 K/UL (ref 0–0.04)
IMM GRANULOCYTES NFR BLD AUTO: 0.3 % (ref 0–0.5)
LYMPHOCYTES # BLD AUTO: 1.2 K/UL (ref 1–4.8)
LYMPHOCYTES NFR BLD: 16.9 % (ref 18–48)
MCH RBC QN AUTO: 32.4 PG (ref 27–31)
MCHC RBC AUTO-ENTMCNC: 32.9 G/DL (ref 32–36)
MCV RBC AUTO: 98 FL (ref 82–98)
MONOCYTES # BLD AUTO: 1 K/UL (ref 0.3–1)
MONOCYTES NFR BLD: 13.8 % (ref 4–15)
NEUTROPHILS # BLD AUTO: 4.9 K/UL (ref 1.8–7.7)
NEUTROPHILS NFR BLD: 66.9 % (ref 38–73)
NRBC BLD-RTO: 0 /100 WBC
PLATELET # BLD AUTO: 156 K/UL (ref 150–450)
PMV BLD AUTO: 9.6 FL (ref 9.2–12.9)
POTASSIUM SERPL-SCNC: 3.2 MMOL/L (ref 3.5–5.1)
PROT SERPL-MCNC: 6.1 G/DL (ref 6–8.4)
RBC # BLD AUTO: 3.49 M/UL (ref 4.6–6.2)
SODIUM SERPL-SCNC: 140 MMOL/L (ref 136–145)
WBC # BLD AUTO: 7.27 K/UL (ref 3.9–12.7)

## 2022-06-28 PROCEDURE — A4216 STERILE WATER/SALINE, 10 ML: HCPCS | Performed by: INTERNAL MEDICINE

## 2022-06-28 PROCEDURE — 80053 COMPREHEN METABOLIC PANEL: CPT | Performed by: STUDENT IN AN ORGANIZED HEALTH CARE EDUCATION/TRAINING PROGRAM

## 2022-06-28 PROCEDURE — 25000003 PHARM REV CODE 250: Performed by: INTERNAL MEDICINE

## 2022-06-28 PROCEDURE — 96375 TX/PRO/DX INJ NEW DRUG ADDON: CPT

## 2022-06-28 PROCEDURE — 96413 CHEMO IV INFUSION 1 HR: CPT

## 2022-06-28 PROCEDURE — 63600175 PHARM REV CODE 636 W HCPCS: Performed by: INTERNAL MEDICINE

## 2022-06-28 PROCEDURE — 36591 DRAW BLOOD OFF VENOUS DEVICE: CPT

## 2022-06-28 PROCEDURE — 85025 COMPLETE CBC W/AUTO DIFF WBC: CPT | Performed by: STUDENT IN AN ORGANIZED HEALTH CARE EDUCATION/TRAINING PROGRAM

## 2022-06-28 RX ORDER — SODIUM CHLORIDE 0.9 % (FLUSH) 0.9 %
10 SYRINGE (ML) INJECTION
Status: DISCONTINUED | OUTPATIENT
Start: 2022-06-28 | End: 2022-06-28 | Stop reason: HOSPADM

## 2022-06-28 RX ORDER — HEPARIN 100 UNIT/ML
500 SYRINGE INTRAVENOUS
Status: DISCONTINUED | OUTPATIENT
Start: 2022-06-28 | End: 2022-06-28 | Stop reason: HOSPADM

## 2022-06-28 RX ORDER — ONDANSETRON 2 MG/ML
8 INJECTION INTRAMUSCULAR; INTRAVENOUS
Status: COMPLETED | OUTPATIENT
Start: 2022-06-28 | End: 2022-06-28

## 2022-06-28 RX ADMIN — ONDANSETRON 8 MG: 2 INJECTION INTRAMUSCULAR; INTRAVENOUS at 03:06

## 2022-06-28 RX ADMIN — HEPARIN 500 UNITS: 100 SYRINGE at 04:06

## 2022-06-28 RX ADMIN — Medication 10 ML: at 04:06

## 2022-06-28 RX ADMIN — GEMCITABINE HYDROCHLORIDE 2210 MG: 2 INJECTION, SOLUTION INTRAVENOUS at 03:06

## 2022-06-28 NOTE — PLAN OF CARE
Patient arrived to unit for D15 Gemzar infusion. Labs reviewed, pt cleared for chemo per Dr. Hathaway. Plan of care reviewed, patient agreeable to plan.Zofran administered prior to Gemzar. Patient tolerated infusion well. VSS. Discharge instructions reviewed, patient instructed to return 7/12. Patient ambulated off unit accompanied by spouse. Patient in NAD at time of discharge.

## 2022-07-07 ENCOUNTER — EXTERNAL HOME HEALTH (OUTPATIENT)
Dept: HOME HEALTH SERVICES | Facility: HOSPITAL | Age: 62
End: 2022-07-07
Payer: MEDICARE

## 2022-07-11 ENCOUNTER — OFFICE VISIT (OUTPATIENT)
Dept: CARDIOLOGY | Facility: CLINIC | Age: 62
End: 2022-07-11
Payer: MEDICARE

## 2022-07-11 VITALS
BODY MASS INDEX: 28.74 KG/M2 | DIASTOLIC BLOOD PRESSURE: 68 MMHG | OXYGEN SATURATION: 88 % | WEIGHT: 200.75 LBS | RESPIRATION RATE: 19 BRPM | SYSTOLIC BLOOD PRESSURE: 129 MMHG | HEIGHT: 70 IN | HEART RATE: 96 BPM

## 2022-07-11 DIAGNOSIS — C34.02 LUNG CANCER, MAIN BRONCHUS, LEFT: ICD-10-CM

## 2022-07-11 DIAGNOSIS — I50.42 CHRONIC COMBINED SYSTOLIC AND DIASTOLIC HEART FAILURE: ICD-10-CM

## 2022-07-11 DIAGNOSIS — I25.10 CORONARY ARTERY DISEASE INVOLVING NATIVE CORONARY ARTERY OF NATIVE HEART WITHOUT ANGINA PECTORIS: ICD-10-CM

## 2022-07-11 DIAGNOSIS — Z86.73 HISTORY OF COMPLETED STROKE: ICD-10-CM

## 2022-07-11 DIAGNOSIS — Z87.891 FORMER SMOKER: Primary | ICD-10-CM

## 2022-07-11 DIAGNOSIS — I47.19 MULTIFOCAL ATRIAL TACHYCARDIA: ICD-10-CM

## 2022-07-11 DIAGNOSIS — I73.9 PAD (PERIPHERAL ARTERY DISEASE): ICD-10-CM

## 2022-07-11 DIAGNOSIS — Z51.5 PALLIATIVE CARE ENCOUNTER: ICD-10-CM

## 2022-07-11 DIAGNOSIS — Z99.81 DEPENDENCE ON SUPPLEMENTAL OXYGEN: ICD-10-CM

## 2022-07-11 DIAGNOSIS — T80.1XXS PHLEBITIS AFTER INFUSION, SEQUELA: ICD-10-CM

## 2022-07-11 DIAGNOSIS — I25.2 HISTORY OF NON-ST ELEVATION MYOCARDIAL INFARCTION (NSTEMI): ICD-10-CM

## 2022-07-11 DIAGNOSIS — I70.0 ATHEROSCLEROSIS OF AORTA: ICD-10-CM

## 2022-07-11 DIAGNOSIS — I10 ESSENTIAL HYPERTENSION: ICD-10-CM

## 2022-07-11 DIAGNOSIS — I80.9 PHLEBITIS AFTER INFUSION, SEQUELA: ICD-10-CM

## 2022-07-11 DIAGNOSIS — R91.8 MULTIPLE PULMONARY NODULES: ICD-10-CM

## 2022-07-11 PROCEDURE — 99213 OFFICE O/P EST LOW 20 MIN: CPT | Mod: PBBFAC | Performed by: INTERNAL MEDICINE

## 2022-07-11 PROCEDURE — 99214 OFFICE O/P EST MOD 30 MIN: CPT | Mod: S$PBB,,, | Performed by: INTERNAL MEDICINE

## 2022-07-11 PROCEDURE — 99999 PR PBB SHADOW E&M-EST. PATIENT-LVL III: ICD-10-PCS | Mod: PBBFAC,,, | Performed by: INTERNAL MEDICINE

## 2022-07-11 PROCEDURE — 99214 PR OFFICE/OUTPT VISIT, EST, LEVL IV, 30-39 MIN: ICD-10-PCS | Mod: S$PBB,,, | Performed by: INTERNAL MEDICINE

## 2022-07-11 PROCEDURE — 99999 PR PBB SHADOW E&M-EST. PATIENT-LVL III: CPT | Mod: PBBFAC,,, | Performed by: INTERNAL MEDICINE

## 2022-07-11 NOTE — PROGRESS NOTES
CARDIOVASCULAR CONSULTATION    REASON FOR CONSULT:   Kashif Iverson is a 61 y.o. male who presents for follow-up.      HISTORY OF PRESENT ILLNESS:     Patient is a pleasant 61-year-old male.  Saw Dr. Green and me in the hospital, now wants to follow up with me.  During the hospital stay Dr. Green had performed a coronary angiogram.  That demonstrated nonobstructive CAD.  He was admitted with shock and he when she has recovered and doing fine.  Denies any chest pains at rest on exertion, orthopnea, PND.  Has history of lung cancer and lung mass.  EF on echo was 35-40%.      Notes April 2022:  Patient here for follow-up.  Denies any chest pains at rest on exertion, orthopnea, PND.    May 22:  Patient here for.  Denies chest pains at rest on exertion.  Does have claudication but cannot tell me which leg is worse.  He states he will have to walk and figure out which leg hurts more.  Denies any resting leg pains or ulcers.       · The left ventricle is normal in size with concentric hypertrophy and normal systolic function.  · The estimated ejection fraction is 65%.  · Indeterminate left ventricular diastolic function.  · Mild right ventricular enlargement with normal right ventricular systolic function.  · Mild left atrial enlargement.  · Mild right atrial enlargement.  · Mild tricuspid regurgitation.  · Normal central venous pressure (3 mmHg).  · The estimated PA systolic pressure is 53 mmHg.  · There is pulmonary hypertension.        Right leg:  >50% PFA stenosis  Mid SFA occlusion  Moderately decreased LUIS FELIPE, 0.48.  Bi/monophasic waveforms.     Left leg:  >50% CFA stenosis  Biphasic waveforms  Borderline normal LUIS FELIPE, 0.98.      Suprarenal aortic ectasia without evidence of AAA, max diam 2.7cm.  Aortic atherosclerosis.  Increased flow velocity across L MORIAH suggesting >50% stenosis.        · Right resting LUIS FELIPE 0.29, is suggestive of severe right lower extremity arterial disease sufficient to cause rest pain.  · Left  resting LUIS FELIPE 0.92, is suggestive of minimal left lower extremity arterial disease.  · PVR waveforms are abnormal at the calf and ankle level on the right.      Notes from July 2022: Patient here for follow-up.  Main complaint is dyspnea on exertion.  No chest pain or   Lifestyle limiting claudication    PAST MEDICAL HISTORY:     Past Medical History:   Diagnosis Date    Combined systolic and diastolic heart failure     Hypertension     Lung cancer     NSCLC    Lung mass     left lung       PAST SURGICAL HISTORY:     Past Surgical History:   Procedure Laterality Date    BRONCHOSCOPY N/A 8/30/2019    Procedure: Bronchoscopy;  Surgeon: Miguel Diagnostic Provider;  Location: [facility] OR [address] [place] [address];  Service: Anesthesiology;  Laterality: N/A;    CORONARY ANGIOGRAPHY N/A 3/4/2022    Procedure: ANGIOGRAM, CORONARY ARTERY;  Surgeon: Robson Green MD;  Location: [facility] CATH LAB;  Service: Cardiology;  Laterality: N/A;       ALLERGIES AND MEDICATION:   Review of patient's allergies indicates:  No Known Allergies     Medication List          Accurate as of July 11, 2022 10:34 AM. If you have any questions, ask your nurse or doctor.            CONTINUE taking these medications    albuterol 90 mcg/actuation inhaler  Commonly known as: VENTOLIN HFA  Inhale 2 puffs into the lungs every 6 (six) hours as needed for Wheezing. Rescue     amLODIPine 10 MG tablet  Commonly known as: NORVASC  Take 1 tablet (10 mg total) by mouth once daily.     aspirin 81 MG Chew  Take 1 tablet (81 mg total) by mouth once daily.     atorvastatin 80 MG tablet  Commonly known as: LIPITOR  Take 1 tablet (80 mg total) by mouth once daily.     carvediloL 6.25 MG tablet  Commonly known as: COREG  Take 1 tablet (6.25 mg total) by mouth 2 (two) times daily.     cilostazoL 100 MG Tab  Commonly known as: PLETAL  Take 1 tablet (100 mg total) by mouth 2 (two) times daily.     clopidogreL 75 mg tablet  Commonly known as: PLAVIX  Take 1 tablet (75 mg total) by mouth once  daily.     furosemide 40 MG tablet  Commonly known as: LASIX  Take 1 tablet (40 mg total) by mouth 2 (two) times a day.     losartan 100 MG tablet  Commonly known as: COZAAR  Take 1 tablet (100 mg total) by mouth once daily.     VITAMIN C ENERGY BOOSTER 1,000 mg Pwep  Generic drug: ascorbic acid-multivit-min            SOCIAL HISTORY:     Social History     Socioeconomic History    Marital status:    Tobacco Use    Smoking status: Former Smoker     Packs/day: 1.00     Years: 25.00     Pack years: 25.00     Types: Cigarettes     Quit date:      Years since quittin.5    Smokeless tobacco: Never Used   Substance and Sexual Activity    Alcohol use: Yes     Comment: ocassionally    Drug use: Never    Sexual activity: Yes     Partners: Female     Social Determinants of Health     Financial Resource Strain: Medium Risk    Difficulty of Paying Living Expenses: Somewhat hard   Food Insecurity: No Food Insecurity    Worried About Running Out of Food in the Last Year: Never true    Ran Out of Food in the Last Year: Never true   Transportation Needs: Unmet Transportation Needs    Lack of Transportation (Medical): Yes    Lack of Transportation (Non-Medical): Yes   Physical Activity: Insufficiently Active    Days of Exercise per Week: 3 days    Minutes of Exercise per Session: 20 min   Stress: Stress Concern Present    Feeling of Stress : To some extent   Social Connections: Unknown    Frequency of Communication with Friends and Family: Once a week    Frequency of Social Gatherings with Friends and Family: Never    Active Member of Clubs or Organizations: No    Attends Club or Organization Meetings: Never    Marital Status:    Housing Stability: High Risk    Unable to Pay for Housing in the Last Year: Yes    Number of Places Lived in the Last Year: 1    Unstable Housing in the Last Year: No       FAMILY HISTORY:     Family History   Problem Relation Age of Onset    Diabetes Mother      "Heart defect Mother     Cancer Father     Cancer Sister     No Known Problems Son        REVIEW OF SYSTEMS:   Review of Systems   Constitutional: Negative.   HENT: Negative.    Eyes: Negative.    Cardiovascular: Negative.    Respiratory: Negative.    Endocrine: Negative.    Hematologic/Lymphatic: Negative.    Skin: Negative.    Musculoskeletal: Negative.    Gastrointestinal: Negative.    Genitourinary: Negative.    Neurological: Negative.    Psychiatric/Behavioral: Negative.    Allergic/Immunologic: Negative.        A 10 point review of systems was performed and all the pertinent positives have been mentioned. Rest of review of systems was negative.        PHYSICAL EXAM:     Vitals:    07/11/22 0848   BP: 129/68   Pulse: 96   Resp: 19    Body mass index is 28.8 kg/m².  Weight: 91 kg (200 lb 11.7 oz)   Height: 5' 10" (177.8 cm)     Physical Exam  Vitals reviewed.   Constitutional:       Appearance: He is well-developed.   HENT:      Head: Normocephalic.   Eyes:      Conjunctiva/sclera: Conjunctivae normal.      Pupils: Pupils are equal, round, and reactive to light.   Cardiovascular:      Rate and Rhythm: Normal rate and regular rhythm.      Heart sounds: Normal heart sounds.   Pulmonary:      Effort: Pulmonary effort is normal.      Breath sounds: Normal breath sounds.   Abdominal:      General: Bowel sounds are normal.      Palpations: Abdomen is soft.   Musculoskeletal:      Cervical back: Normal range of motion and neck supple.   Skin:     General: Skin is warm.   Neurological:      Mental Status: He is alert and oriented to person, place, and time.           DATA:     Laboratory:  CBC:  Recent Labs   Lab 06/14/22  1350 06/23/22  0847 06/28/22  1316   WBC 11.24 5.50 7.27   Hemoglobin 11.3 L 11.9 L 11.3 L   Hematocrit 34.2 L 35.8 L 34.3 L   Platelets 419 250 156       CHEMISTRIES:  Recent Labs   Lab 05/20/22  0326 05/21/22  0423 05/31/22  1237 06/11/22  0343 06/14/22  1350 06/23/22  0847 06/28/22  1316 "   Glucose 94 111 H   < > 186 H 222 H 124 H 139 H   Sodium 145 144   < > 140 139 140 140   Potassium 3.3 L 2.7 LL   < > 4.0 4.0 3.1 L 3.2 L   BUN 21 23   < > 10 26 H 13 11   Creatinine 1.0 1.0   < > 0.8 1.2 0.9 0.9   eGFR if African American >60 >60   < > >60 >60 >60.0 >60   eGFR if non African American >60 >60   < > >60 >60 >60.0 >60   Calcium 8.8 8.7   < > 8.6 L 8.6 L 8.8 8.7   Magnesium 1.9 1.7  --  2.0  --   --   --     < > = values in this interval not displayed.       CARDIAC BIOMARKERS:  Recent Labs   Lab 05/17/22  1428 05/17/22  1836 06/10/22  1435   Troponin I 0.016 0.043 H 0.069 H       COAGS:  Recent Labs   Lab 02/22/22  1452 03/04/22  0424 06/10/22  1438   INR 1.2 1.1 1.1       LIPIDS/LFTS:  Recent Labs   Lab 09/09/20  0956 09/28/20  0722 08/17/21  0733 08/24/21  0846 02/23/22  0407 02/23/22  1258 06/14/22  1350 06/23/22  0847 06/28/22  1316   Cholesterol 183  --  186  --  125  --   --   --   --    Triglycerides 87  --  172 H  --  224 H  --   --   --   --    HDL 33 L  --  38 L  --  26 L  --   --   --   --    LDL Cholesterol 132.6  --  113.6  --  54.2 L  --   --   --   --    Non-HDL Cholesterol 150  --  148  --  99  --   --   --   --    AST 18   < > 20   < > 29   < > 39 25 18   ALT 24   < > 26   < > 19   < > 50 H 26 16    < > = values in this interval not displayed.       Hemoglobin A1C   Date Value Ref Range Status   02/23/2022 5.0 4.0 - 5.6 % Final     Comment:     ADA Screening Guidelines:  5.7-6.4%  Consistent with prediabetes  >or=6.5%  Consistent with diabetes    High levels of fetal hemoglobin interfere with the HbA1C  assay. Heterozygous hemoglobin variants (HbS, HgC, etc)do  not significantly interfere with this assay.   However, presence of multiple variants may affect accuracy.     08/17/2021 6.0 (H) 4.0 - 5.6 % Final     Comment:     ADA Screening Guidelines:  5.7-6.4%  Consistent with prediabetes  >or=6.5%  Consistent with diabetes    High levels of fetal hemoglobin interfere with the  HbA1C  assay. Heterozygous hemoglobin variants (HbS, HgC, etc)do  not significantly interfere with this assay.   However, presence of multiple variants may affect accuracy.     09/04/2019 5.6 4.0 - 5.6 % Final     Comment:     ADA Screening Guidelines:  5.7-6.4%  Consistent with prediabetes  >or=6.5%  Consistent with diabetes  High levels of fetal hemoglobin interfere with the HbA1C  assay. Heterozygous hemoglobin variants (HbS, HgC, etc)do  not significantly interfere with this assay.   However, presence of multiple variants may affect accuracy.         TSH  Recent Labs   Lab 11/05/20  1107 01/20/21  1148 02/22/22  1452   TSH 0.575 0.888 1.310       The ASCVD Risk score (Jenniejessica TINAJERO Jr., et al., 2013) failed to calculate for the following reasons:    The patient has a prior MI or stroke diagnosis             ASSESSMENT AND PLAN     Patient Active Problem List   Diagnosis    Former smoker    Multiple pulmonary nodules    Macrocytic anemia    History of completed stroke    Poor dentition    Lung cancer, main bronchus, left    Secondary malignant neoplasm of bone    Phlebitis after infusion    Immunodeficiency due to drugs    Atherosclerosis of aorta    Bronchiectasis without complication    Essential hypertension    History of non-ST elevation myocardial infarction (NSTEMI)    Palliative care encounter    Multifocal atrial tachycardia    Chronic combined systolic and diastolic heart failure    PAD (peripheral artery disease)    Coronary artery disease involving native coronary artery of native heart without angina pectoris    Goals of care, counseling/discussion    Dependence on supplemental oxygen    Chronic respiratory failure with hypoxia         Nonischemic cardiomyopathy:  EF 35-40%.  Catheterization showed nonobstructive CAD.  Currently euvolemic.  Continue long-acting beta blockers and losartan.    Hypertension:  Now well controlled    Nonobstructive CAD:  Aggressive risk factor  modification    Abnormal ABIs.  Check peripheral ultrasound.  Peripheral ultrasound revealed greater than 50% left common iliac stenosis, left SFA disease, chronic total occlusion of right SFA.on pletal.  Currently denies any lifestyle limiting claudication.  Denies any ulcers on his feet.Consider revascularization if patient continues to lifestyle limiting claudication despite maximum medical management and walking therapy.    · Right resting LUIS FELIPE 0.29, is suggestive of severe right lower extremity arterial disease sufficient to cause rest pain.  · Left resting LUIS FELIPE 0.92, is suggestive of minimal left lower extremity arterial disease.  · PVR waveforms are abnormal at the calf and ankle level on the right.        Follow-up in 3 month        Thank you very much for involving me in the care of your patient.  Please do not hesitate to contact me if there are any questions.      Catia Roman MD, FACC, [facility]  Interventional Cardiologist, [facility].           This note was dictated with the help of speech recognition software.  There might be un-intended errors and/or substitutions.

## 2022-07-19 ENCOUNTER — INFUSION (OUTPATIENT)
Dept: INFUSION THERAPY | Facility: HOSPITAL | Age: 62
End: 2022-07-19
Attending: STUDENT IN AN ORGANIZED HEALTH CARE EDUCATION/TRAINING PROGRAM
Payer: MEDICARE

## 2022-07-19 VITALS
OXYGEN SATURATION: 95 % | TEMPERATURE: 98 F | HEART RATE: 87 BPM | SYSTOLIC BLOOD PRESSURE: 110 MMHG | RESPIRATION RATE: 17 BRPM | DIASTOLIC BLOOD PRESSURE: 65 MMHG

## 2022-07-19 DIAGNOSIS — C79.51 SECONDARY MALIGNANT NEOPLASM OF BONE: ICD-10-CM

## 2022-07-19 DIAGNOSIS — C34.02 LUNG CANCER, MAIN BRONCHUS, LEFT: Primary | ICD-10-CM

## 2022-07-19 DIAGNOSIS — R91.8 MULTIPLE PULMONARY NODULES: ICD-10-CM

## 2022-07-19 LAB
ALBUMIN SERPL BCP-MCNC: 3.3 G/DL (ref 3.5–5.2)
ALP SERPL-CCNC: 97 U/L (ref 55–135)
ALT SERPL W/O P-5'-P-CCNC: 13 U/L (ref 10–44)
ANION GAP SERPL CALC-SCNC: 10 MMOL/L (ref 8–16)
AST SERPL-CCNC: 18 U/L (ref 10–40)
BASOPHILS # BLD AUTO: 0.12 K/UL (ref 0–0.2)
BASOPHILS NFR BLD: 1.3 % (ref 0–1.9)
BILIRUB SERPL-MCNC: 0.7 MG/DL (ref 0.1–1)
BUN SERPL-MCNC: 11 MG/DL (ref 8–23)
CALCIUM SERPL-MCNC: 8.4 MG/DL (ref 8.7–10.5)
CHLORIDE SERPL-SCNC: 105 MMOL/L (ref 95–110)
CO2 SERPL-SCNC: 26 MMOL/L (ref 23–29)
CREAT SERPL-MCNC: 1.1 MG/DL (ref 0.5–1.4)
DIFFERENTIAL METHOD: ABNORMAL
EOSINOPHIL # BLD AUTO: 0.1 K/UL (ref 0–0.5)
EOSINOPHIL NFR BLD: 1.5 % (ref 0–8)
ERYTHROCYTE [DISTWIDTH] IN BLOOD BY AUTOMATED COUNT: 15.7 % (ref 11.5–14.5)
EST. GFR  (AFRICAN AMERICAN): >60 ML/MIN/1.73 M^2
EST. GFR  (NON AFRICAN AMERICAN): >60 ML/MIN/1.73 M^2
GLUCOSE SERPL-MCNC: 163 MG/DL (ref 70–110)
HCT VFR BLD AUTO: 37.8 % (ref 40–54)
HGB BLD-MCNC: 12.4 G/DL (ref 14–18)
IMM GRANULOCYTES # BLD AUTO: 0.05 K/UL (ref 0–0.04)
IMM GRANULOCYTES NFR BLD AUTO: 0.5 % (ref 0–0.5)
LYMPHOCYTES # BLD AUTO: 1.3 K/UL (ref 1–4.8)
LYMPHOCYTES NFR BLD: 13.2 % (ref 18–48)
MCH RBC QN AUTO: 31.1 PG (ref 27–31)
MCHC RBC AUTO-ENTMCNC: 32.8 G/DL (ref 32–36)
MCV RBC AUTO: 95 FL (ref 82–98)
MONOCYTES # BLD AUTO: 1 K/UL (ref 0.3–1)
MONOCYTES NFR BLD: 10.3 % (ref 4–15)
NEUTROPHILS # BLD AUTO: 7 K/UL (ref 1.8–7.7)
NEUTROPHILS NFR BLD: 73.2 % (ref 38–73)
NRBC BLD-RTO: 0 /100 WBC
PLATELET # BLD AUTO: 347 K/UL (ref 150–450)
PMV BLD AUTO: 9.7 FL (ref 9.2–12.9)
POTASSIUM SERPL-SCNC: 3.4 MMOL/L (ref 3.5–5.1)
PROT SERPL-MCNC: 6.3 G/DL (ref 6–8.4)
RBC # BLD AUTO: 3.99 M/UL (ref 4.6–6.2)
SODIUM SERPL-SCNC: 141 MMOL/L (ref 136–145)
WBC # BLD AUTO: 9.5 K/UL (ref 3.9–12.7)

## 2022-07-19 PROCEDURE — 36591 DRAW BLOOD OFF VENOUS DEVICE: CPT

## 2022-07-19 PROCEDURE — 96375 TX/PRO/DX INJ NEW DRUG ADDON: CPT

## 2022-07-19 PROCEDURE — 96413 CHEMO IV INFUSION 1 HR: CPT

## 2022-07-19 PROCEDURE — 85025 COMPLETE CBC W/AUTO DIFF WBC: CPT | Performed by: STUDENT IN AN ORGANIZED HEALTH CARE EDUCATION/TRAINING PROGRAM

## 2022-07-19 PROCEDURE — 25000003 PHARM REV CODE 250: Performed by: STUDENT IN AN ORGANIZED HEALTH CARE EDUCATION/TRAINING PROGRAM

## 2022-07-19 PROCEDURE — 63600175 PHARM REV CODE 636 W HCPCS: Performed by: STUDENT IN AN ORGANIZED HEALTH CARE EDUCATION/TRAINING PROGRAM

## 2022-07-19 PROCEDURE — A4216 STERILE WATER/SALINE, 10 ML: HCPCS | Performed by: STUDENT IN AN ORGANIZED HEALTH CARE EDUCATION/TRAINING PROGRAM

## 2022-07-19 PROCEDURE — 80053 COMPREHEN METABOLIC PANEL: CPT | Performed by: STUDENT IN AN ORGANIZED HEALTH CARE EDUCATION/TRAINING PROGRAM

## 2022-07-19 RX ORDER — HEPARIN 100 UNIT/ML
500 SYRINGE INTRAVENOUS
Status: DISCONTINUED | OUTPATIENT
Start: 2022-07-19 | End: 2022-07-19 | Stop reason: HOSPADM

## 2022-07-19 RX ORDER — ONDANSETRON 2 MG/ML
8 INJECTION INTRAMUSCULAR; INTRAVENOUS
Status: COMPLETED | OUTPATIENT
Start: 2022-07-19 | End: 2022-07-19

## 2022-07-19 RX ORDER — SODIUM CHLORIDE 0.9 % (FLUSH) 0.9 %
10 SYRINGE (ML) INJECTION
Status: DISCONTINUED | OUTPATIENT
Start: 2022-07-19 | End: 2022-07-19 | Stop reason: HOSPADM

## 2022-07-19 RX ADMIN — SODIUM CHLORIDE 2210 MG: 9 INJECTION, SOLUTION INTRAVENOUS at 03:07

## 2022-07-19 RX ADMIN — Medication 10 ML: at 04:07

## 2022-07-19 RX ADMIN — HEPARIN 500 UNITS: 100 SYRINGE at 04:07

## 2022-07-19 RX ADMIN — ONDANSETRON 8 MG: 2 INJECTION INTRAMUSCULAR; INTRAVENOUS at 03:07

## 2022-07-19 NOTE — PLAN OF CARE
Patient arrived to unit for C12D1 Gemzar infusion. Pt reports SOB with short and long walks. Pt continues checking pulse Ox at home resulting in readings of usually 90-95 % with drops as low as 85-88 recovering only when pt stops walking and rests. Labs reviewed, pt cleared for chemo per Dr. Hathaway. Plan of care reviewed, patient agreeable to plan. Zofran administered prior to chemo. Gemzar administered over 30 minutes without issue. Patient tolerated infusion well.VSS. Discharge instructions reviewed, patient instructed to return 8/2. Patient left off unit in w/c escorted  by spouse. AVS given to spouse. Patient in NAD at time of discharge.

## 2022-07-22 ENCOUNTER — OFFICE VISIT (OUTPATIENT)
Dept: HEMATOLOGY/ONCOLOGY | Facility: CLINIC | Age: 62
End: 2022-07-22
Payer: MEDICARE

## 2022-07-22 VITALS
DIASTOLIC BLOOD PRESSURE: 78 MMHG | SYSTOLIC BLOOD PRESSURE: 142 MMHG | BODY MASS INDEX: 27.94 KG/M2 | HEART RATE: 108 BPM | WEIGHT: 195.13 LBS | HEIGHT: 70 IN | OXYGEN SATURATION: 90 %

## 2022-07-22 DIAGNOSIS — C34.02 LUNG CANCER, MAIN BRONCHUS, LEFT: Primary | ICD-10-CM

## 2022-07-22 DIAGNOSIS — I50.42 CHRONIC COMBINED SYSTOLIC AND DIASTOLIC HEART FAILURE: Chronic | ICD-10-CM

## 2022-07-22 DIAGNOSIS — D84.821 IMMUNODEFICIENCY DUE TO DRUGS: Chronic | ICD-10-CM

## 2022-07-22 DIAGNOSIS — C79.51 SECONDARY MALIGNANT NEOPLASM OF BONE: ICD-10-CM

## 2022-07-22 DIAGNOSIS — Z79.899 IMMUNODEFICIENCY DUE TO DRUGS: Chronic | ICD-10-CM

## 2022-07-22 PROCEDURE — 99999 PR PBB SHADOW E&M-EST. PATIENT-LVL IV: ICD-10-PCS | Mod: PBBFAC,,, | Performed by: STUDENT IN AN ORGANIZED HEALTH CARE EDUCATION/TRAINING PROGRAM

## 2022-07-22 PROCEDURE — 99215 OFFICE O/P EST HI 40 MIN: CPT | Mod: S$PBB,,, | Performed by: STUDENT IN AN ORGANIZED HEALTH CARE EDUCATION/TRAINING PROGRAM

## 2022-07-22 PROCEDURE — 99214 OFFICE O/P EST MOD 30 MIN: CPT | Mod: PBBFAC | Performed by: STUDENT IN AN ORGANIZED HEALTH CARE EDUCATION/TRAINING PROGRAM

## 2022-07-22 PROCEDURE — 99215 PR OFFICE/OUTPT VISIT, EST, LEVL V, 40-54 MIN: ICD-10-PCS | Mod: S$PBB,,, | Performed by: STUDENT IN AN ORGANIZED HEALTH CARE EDUCATION/TRAINING PROGRAM

## 2022-07-22 PROCEDURE — 99999 PR PBB SHADOW E&M-EST. PATIENT-LVL IV: CPT | Mod: PBBFAC,,, | Performed by: STUDENT IN AN ORGANIZED HEALTH CARE EDUCATION/TRAINING PROGRAM

## 2022-07-22 NOTE — PROGRESS NOTES
"  PATIENT: Kashif Iverson  MRN: [id number]  DATE: 7/22/2022      Diagnosis:   1. Lung cancer, main bronchus, left    2. Chronic combined systolic and diastolic heart failure    3. Secondary malignant neoplasm of bone    4. Immunodeficiency due to drugs        Chief Complaint: Lung Cancer      Oncologic History:    Oncologic History 1. 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 Palliative chemoradiation with carboplatin and paclitaxel; anaphylactic reaction to paclitaxel  2. Pembrolizumab  3. Pembrolizumab+ramucirumab (LungMAP trial)  4. Gemcitabine    Pathology Squamous cell carcinoma, no actionable mutations, PD-L1 5%        Subjective:    Interval History: Mr. Iverson returns for follow up.     8/30/2019 underwent bronchoscopy that showed "A partially obstructing (about 80% obstructed) mass was found in the middle portion in the left mainstem bronchus. The mass was large and bloody, circumferential, endobronchial, friable and necrotic. 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  No actionable mutations and PD-L1 5%.  9/19/21 staging PET CT showed "Hypermetabolic left lung mass concerning for primary pulmonary malignancy with metastatic disease involving the right 6th and 9th ribs as well as mediastinal and left supraclavicular lymph nodes."  Stage IV squamous cell carcinoma of the lung at diagnosis.    10/8/2019-10/21/2019 he received palliative chemoradiation for rib pain with carboplatin and paclitaxel.  He received 3 cycles of carboplatin and paclitaxel.  He developed anaphylactic reaction to paclitaxel.  The chest was treated with AP:PA fields and the right 9th rib was treated with anterior and posterior oblique fields at 300 cGy per fraction to 3000 cGy.   Lt chest 6X 300 10 / 10 3,000   Rt 9th rib 6X 300 10 / 10 3,000     10/31/2019-9/10/2020 he received pembrolizumab (completed 15 cycles, last dose " 8/21/2020)  9/10/2020 PET CT showed progression of disease.  He was then referred to Dr. Key for evaluation for clinical trials.  10/20/2020 he underwent repeat biopsy for LUNG MAP trial and was randomized to Ramucirumab + pembrolizumab.    11/17/2020 started ramucirumab+pembrolizumab.  Completed 4 cycles and then 2/10/2021 scans showed progression.    2/24/2021 he was subsequent started on gemcitabine with Dr. Cruz.  His care was transferred to Dr. Romo who continued his current regimen and now is currently under my care.   5/17/22-5/22/22 admitted for acute respiratory failure secondary to heart failure exacerbation requiring intubation.  He was extubated and diuresed successfully.  --  He is doing well since I last saw him.  No leg swelling or symptoms of heart failure exacerbation.  No hospitalizations since his last visit.    He feels he is at baseline. He does have RLE claudication but his wife says he has been very active.  No new symptoms to report otherwise.    Wife accompanies him at this visit.    Past Medical History:   Past Medical History:   Diagnosis Date    Combined systolic and diastolic heart failure     Hypertension     Lung cancer     NSCLC    Lung mass     left lung       Past Surgical HIstory:   Past Surgical History:   Procedure Laterality Date    BRONCHOSCOPY N/A 8/30/2019    Procedure: Bronchoscopy;  Surgeon: [facility] Provider;  Location: [address];  Service: Anesthesiology;  Laterality: N/A;    CORONARY ANGIOGRAPHY N/A 3/4/2022    Procedure: ANGIOGRAM, CORONARY ARTERY;  Surgeon: Robson Green MD;  Location: [facility] CATH LAB;  Service: Cardiology;  Laterality: N/A;       Family History:   Family History   Problem Relation Age of Onset    Diabetes Mother     Heart defect Mother     Cancer Father     Cancer Sister     No Known Problems Son        Social History:  reports that he quit smoking about 2 years ago. His smoking use included cigarettes. He has a 25.00  pack-year smoking history. He has never used smokeless tobacco. He reports current alcohol use. He reports that he does not use drugs.    Allergies:  Review of patient's allergies indicates:  No Known Allergies    Medications:  Current Outpatient Medications   Medication Sig Dispense Refill    albuterol (VENTOLIN HFA) 90 mcg/actuation inhaler Inhale 2 puffs into the lungs every 6 (six) hours as needed for Wheezing. Rescue 18 g 11    amLODIPine (NORVASC) 10 MG tablet Take 1 tablet (10 mg total) by mouth once daily. 30 tablet 11    ascorbic acid-multivit-min (VITAMIN C ENERGY BOOSTER) 1,000 mg PwEP Take by mouth. Patient takes 3,000 mg per day      aspirin 81 MG Chew Take 1 tablet (81 mg total) by mouth once daily. 30 tablet 11    atorvastatin (LIPITOR) 80 MG tablet Take 1 tablet (80 mg total) by mouth once daily. 90 tablet 3    carvediloL (COREG) 6.25 MG tablet Take 1 tablet (6.25 mg total) by mouth 2 (two) times daily. 60 tablet 11    cilostazoL (PLETAL) 100 MG Tab Take 1 tablet (100 mg total) by mouth 2 (two) times daily. 60 tablet 11    clopidogreL (PLAVIX) 75 mg tablet Take 1 tablet (75 mg total) by mouth once daily. 30 tablet 11    furosemide (LASIX) 40 MG tablet Take 1 tablet (40 mg total) by mouth 2 (two) times a day. 60 tablet 2    losartan (COZAAR) 100 MG tablet Take 1 tablet (100 mg total) by mouth once daily. 90 tablet 3     No current facility-administered medications for this visit.       Review of Systems   Constitutional: Negative for activity change, appetite change, chills, diaphoresis, fatigue, fever and unexpected weight change.   HENT: Negative for nosebleeds and trouble swallowing.    Eyes: Negative for visual disturbance.   Respiratory: Negative for cough, chest tightness, shortness of breath and wheezing.    Cardiovascular: Negative for chest pain and leg swelling.   Gastrointestinal: Negative for abdominal distention, abdominal pain, blood in stool, constipation, diarrhea, nausea and  "vomiting.   Endocrine: Negative for cold intolerance and heat intolerance.   Genitourinary: Negative for difficulty urinating and dysuria.   Musculoskeletal: Negative for arthralgias, back pain and myalgias.   Skin: Negative for color change.   Neurological: Negative for dizziness, weakness, light-headedness, numbness and headaches.   Hematological: Negative for adenopathy. Bruises/bleeds easily.   Psychiatric/Behavioral: Negative for confusion.       ECOG Performance Status:     ECOG SCORE    1 - Restricted in strenuous activity-ambulatory and able to carry out work of a light nature         Objective:      Vitals:   Vitals:    07/22/22 0818   BP: (!) 142/78   BP Location: Left arm   Patient Position: Sitting   BP Method: Large (Automatic)   Pulse: 108   SpO2: (!) 90%   Weight: 88.5 kg (195 lb 1.7 oz)   Height: 5' 10" (1.778 m)     BMI: Body mass index is 27.99 kg/m².    Physical Exam  Constitutional:       General: He is not in acute distress.     Appearance: Normal appearance. He is not ill-appearing.   HENT:      Head: Normocephalic and atraumatic.      Nose: Nose normal.      Mouth/Throat:      Pharynx: No oropharyngeal exudate or posterior oropharyngeal erythema.   Eyes:      General: No scleral icterus.     Extraocular Movements: Extraocular movements intact.      Conjunctiva/sclera: Conjunctivae normal.      Pupils: Pupils are equal, round, and reactive to light.   Cardiovascular:      Rate and Rhythm: Normal rate and regular rhythm.      Heart sounds: No murmur heard.    No friction rub. No gallop.      Comments: Right chest wall port c/d/i  Pulmonary:      Effort: Pulmonary effort is normal. No respiratory distress.      Breath sounds: No stridor. No wheezing, rhonchi or rales.   Abdominal:      General: Bowel sounds are normal. There is no distension.      Palpations: Abdomen is soft. There is no mass.      Tenderness: There is no abdominal tenderness. There is no guarding or rebound.   Musculoskeletal:   "       General: No swelling or tenderness. Normal range of motion.      Cervical back: Normal range of motion and neck supple.      Right lower leg: No edema.      Left lower leg: No edema.   Skin:     General: Skin is warm and dry.      Findings: Bruising present.   Neurological:      General: No focal deficit present.      Mental Status: He is alert.         Laboratory Data:   Recent Results (from the past 168 hour(s))   CBC Auto Differential    Collection Time: 07/19/22  1:17 PM   Result Value Ref Range    WBC 9.50 3.90 - 12.70 K/uL    RBC 3.99 (L) 4.60 - 6.20 M/uL    Hemoglobin 12.4 (L) 14.0 - 18.0 g/dL    Hematocrit 37.8 (L) 40.0 - 54.0 %    MCV 95 82 - 98 fL    MCH 31.1 (H) 27.0 - 31.0 pg    MCHC 32.8 32.0 - 36.0 g/dL    RDW 15.7 (H) 11.5 - 14.5 %    Platelets 347 150 - 450 K/uL    MPV 9.7 9.2 - 12.9 fL    Immature Granulocytes 0.5 0.0 - 0.5 %    Gran # (ANC) 7.0 1.8 - 7.7 K/uL    Immature Grans (Abs) 0.05 (H) 0.00 - 0.04 K/uL    Lymph # 1.3 1.0 - 4.8 K/uL    Mono # 1.0 0.3 - 1.0 K/uL    Eos # 0.1 0.0 - 0.5 K/uL    Baso # 0.12 0.00 - 0.20 K/uL    nRBC 0 0 /100 WBC    Gran % 73.2 (H) 38.0 - 73.0 %    Lymph % 13.2 (L) 18.0 - 48.0 %    Mono % 10.3 4.0 - 15.0 %    Eosinophil % 1.5 0.0 - 8.0 %    Basophil % 1.3 0.0 - 1.9 %    Differential Method Automated    Comprehensive Metabolic Panel    Collection Time: 07/19/22  1:17 PM   Result Value Ref Range    Sodium 141 136 - 145 mmol/L    Potassium 3.4 (L) 3.5 - 5.1 mmol/L    Chloride 105 95 - 110 mmol/L    CO2 26 23 - 29 mmol/L    Glucose 163 (H) 70 - 110 mg/dL    BUN 11 8 - 23 mg/dL    Creatinine 1.1 0.5 - 1.4 mg/dL    Calcium 8.4 (L) 8.7 - 10.5 mg/dL    Total Protein 6.3 6.0 - 8.4 g/dL    Albumin 3.3 (L) 3.5 - 5.2 g/dL    Total Bilirubin 0.7 0.1 - 1.0 mg/dL    Alkaline Phosphatase 97 55 - 135 U/L    AST 18 10 - 40 U/L    ALT 13 10 - 44 U/L    Anion Gap 10 8 - 16 mmol/L    eGFR if African American >60 >60 mL/min/1.73 m^2    eGFR if non African American >60 >60  "mL/min/1.73 m^2         Imaging:         Assessment:       1. Lung cancer, main bronchus, left    2. Chronic combined systolic and diastolic heart failure    3. Secondary malignant neoplasm of bone    4. Immunodeficiency due to drugs           Plan:       Problem List Items Addressed This Visit        Cardiac/Vascular    Chronic combined systolic and diastolic heart failure (Chronic)    Current Assessment & Plan     Wt Readings from Last 4 Encounters:   07/22/22 88.5 kg (195 lb 1.7 oz)   07/11/22 91 kg (200 lb 11.7 oz)   06/23/22 92 kg (202 lb 13.2 oz)   06/16/22 95.1 kg (209 lb 10.5 oz)   weight decreased, no signs or symptoms of heart failure exacerbation  -monitor weight                Immunology/Multi System    Immunodeficiency due to drugs (Chronic)    Current Assessment & Plan     Afebrile, ANC sufficient for treatment this week  -monitor cbc              Oncology    Lung cancer, main bronchus, left - Primary (Chronic)    Overview     8/30/2019 underwent bronchoscopy that showed "A partially obstructing (about 80% obstructed) mass was found in the middle portion in the left mainstem bronchus. The mass was large and bloody, circumferential, endobronchial, friable and necrotic. 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  No actionable mutations and PD-L1 5%.  9/19/21 staging PET CT showed "Hypermetabolic left lung mass concerning for primary pulmonary malignancy with metastatic disease involving the right 6th and 9th ribs as well as mediastinal and left supraclavicular lymph nodes."  Stage IV squamous cell carcinoma of the lung at diagnosis.    10/8/2019-10/21/2019 he received palliative chemoradiation for rib pain with carboplatin and paclitaxel.  He received 3 cycles of carboplatin and paclitaxel.  He developed anaphylactic reaction to paclitaxel.  The chest was treated with AP:PA fields and the right 9th rib was treated with " anterior and posterior oblique fields at 300 cGy per fraction to 3000 cGy.   Lt chest 6X 300 10 / 10 3,000   Rt 9th rib 6X 300 10 / 10 3,000     10/31/2019-9/10/2020 he received pembrolizumab (completed 15 cycles, last dose 8/21/2020)  9/10/2020 PET CT showed progression of disease.  He was then referred to Dr. Key for evaluation for clinical trials.  10/20/2020 he underwent repeat biopsy for LUNG MAP trial and was randomized to Ramucirumab + pembrolizumab.    11/17/2020 started ramucirumab+pembrolizumab.  Completed 4 cycles and then 2/10/2021 scans showed progression.    2/24/2021 he was subsequent started on gemcitabine with Dr. Cruz.           Current Assessment & Plan     No new symptoms since his last visit  -continue with gemcitabine D1 and D15  -labs prior to each dose  -labs, scans, and follow up in 1 month  -will transition to new oncologist after August           Relevant Orders    CT Chest With Contrast    CT Abdomen Pelvis With Contrast    Secondary malignant neoplasm of bone    Relevant Orders    CT Chest With Contrast    CT Abdomen Pelvis With Contrast          Orders Placed This Encounter   Procedures    CT Chest With Contrast    CT Abdomen Pelvis With Contrast           Mario Hathaway MD  Hematology Oncology

## 2022-07-22 NOTE — ASSESSMENT & PLAN NOTE
Wt Readings from Last 4 Encounters:   07/22/22 88.5 kg (195 lb 1.7 oz)   07/11/22 91 kg (200 lb 11.7 oz)   06/23/22 92 kg (202 lb 13.2 oz)   06/16/22 95.1 kg (209 lb 10.5 oz)   weight decreased, no signs or symptoms of heart failure exacerbation  -monitor weight

## 2022-07-22 NOTE — ASSESSMENT & PLAN NOTE
No new symptoms since his last visit  -continue with gemcitabine D1 and D15  -labs prior to each dose  -labs, scans, and follow up in 1 month  -will transition to new oncologist after August

## 2022-08-02 ENCOUNTER — INFUSION (OUTPATIENT)
Dept: INFUSION THERAPY | Facility: HOSPITAL | Age: 62
End: 2022-08-02
Attending: STUDENT IN AN ORGANIZED HEALTH CARE EDUCATION/TRAINING PROGRAM
Payer: MEDICARE

## 2022-08-02 VITALS
RESPIRATION RATE: 16 BRPM | OXYGEN SATURATION: 95 % | SYSTOLIC BLOOD PRESSURE: 112 MMHG | HEART RATE: 74 BPM | DIASTOLIC BLOOD PRESSURE: 69 MMHG | TEMPERATURE: 98 F

## 2022-08-02 DIAGNOSIS — R91.8 MULTIPLE PULMONARY NODULES: ICD-10-CM

## 2022-08-02 DIAGNOSIS — C79.51 SECONDARY MALIGNANT NEOPLASM OF BONE: ICD-10-CM

## 2022-08-02 DIAGNOSIS — C34.02 LUNG CANCER, MAIN BRONCHUS, LEFT: Primary | ICD-10-CM

## 2022-08-02 LAB
ALBUMIN SERPL BCP-MCNC: 3.2 G/DL (ref 3.5–5.2)
ALP SERPL-CCNC: 82 U/L (ref 55–135)
ALT SERPL W/O P-5'-P-CCNC: 18 U/L (ref 10–44)
ANION GAP SERPL CALC-SCNC: 11 MMOL/L (ref 8–16)
AST SERPL-CCNC: 20 U/L (ref 10–40)
BASOPHILS # BLD AUTO: 0.04 K/UL (ref 0–0.2)
BASOPHILS NFR BLD: 0.5 % (ref 0–1.9)
BILIRUB SERPL-MCNC: 0.6 MG/DL (ref 0.1–1)
BUN SERPL-MCNC: 10 MG/DL (ref 8–23)
CALCIUM SERPL-MCNC: 8.8 MG/DL (ref 8.7–10.5)
CHLORIDE SERPL-SCNC: 105 MMOL/L (ref 95–110)
CO2 SERPL-SCNC: 25 MMOL/L (ref 23–29)
CREAT SERPL-MCNC: 1.1 MG/DL (ref 0.5–1.4)
DIFFERENTIAL METHOD: ABNORMAL
EOSINOPHIL # BLD AUTO: 0.1 K/UL (ref 0–0.5)
EOSINOPHIL NFR BLD: 1.6 % (ref 0–8)
ERYTHROCYTE [DISTWIDTH] IN BLOOD BY AUTOMATED COUNT: 15.9 % (ref 11.5–14.5)
EST. GFR  (NO RACE VARIABLE): >60 ML/MIN/1.73 M^2
GLUCOSE SERPL-MCNC: 144 MG/DL (ref 70–110)
HCT VFR BLD AUTO: 35.6 % (ref 40–54)
HGB BLD-MCNC: 12.3 G/DL (ref 14–18)
IMM GRANULOCYTES # BLD AUTO: 0.07 K/UL (ref 0–0.04)
IMM GRANULOCYTES NFR BLD AUTO: 0.9 % (ref 0–0.5)
LYMPHOCYTES # BLD AUTO: 1.4 K/UL (ref 1–4.8)
LYMPHOCYTES NFR BLD: 17.8 % (ref 18–48)
MCH RBC QN AUTO: 31.9 PG (ref 27–31)
MCHC RBC AUTO-ENTMCNC: 34.6 G/DL (ref 32–36)
MCV RBC AUTO: 92 FL (ref 82–98)
MONOCYTES # BLD AUTO: 1 K/UL (ref 0.3–1)
MONOCYTES NFR BLD: 12.6 % (ref 4–15)
NEUTROPHILS # BLD AUTO: 5.1 K/UL (ref 1.8–7.7)
NEUTROPHILS NFR BLD: 66.6 % (ref 38–73)
NRBC BLD-RTO: 0 /100 WBC
PLATELET # BLD AUTO: 265 K/UL (ref 150–450)
PMV BLD AUTO: 9.8 FL (ref 9.2–12.9)
POTASSIUM SERPL-SCNC: 2.9 MMOL/L (ref 3.5–5.1)
PROT SERPL-MCNC: 6.3 G/DL (ref 6–8.4)
RBC # BLD AUTO: 3.86 M/UL (ref 4.6–6.2)
SODIUM SERPL-SCNC: 141 MMOL/L (ref 136–145)
WBC # BLD AUTO: 7.6 K/UL (ref 3.9–12.7)

## 2022-08-02 PROCEDURE — A4216 STERILE WATER/SALINE, 10 ML: HCPCS | Performed by: STUDENT IN AN ORGANIZED HEALTH CARE EDUCATION/TRAINING PROGRAM

## 2022-08-02 PROCEDURE — 36591 DRAW BLOOD OFF VENOUS DEVICE: CPT

## 2022-08-02 PROCEDURE — 85025 COMPLETE CBC W/AUTO DIFF WBC: CPT | Performed by: STUDENT IN AN ORGANIZED HEALTH CARE EDUCATION/TRAINING PROGRAM

## 2022-08-02 PROCEDURE — 96413 CHEMO IV INFUSION 1 HR: CPT

## 2022-08-02 PROCEDURE — 25000003 PHARM REV CODE 250: Performed by: STUDENT IN AN ORGANIZED HEALTH CARE EDUCATION/TRAINING PROGRAM

## 2022-08-02 PROCEDURE — 80053 COMPREHEN METABOLIC PANEL: CPT | Performed by: STUDENT IN AN ORGANIZED HEALTH CARE EDUCATION/TRAINING PROGRAM

## 2022-08-02 PROCEDURE — 63600175 PHARM REV CODE 636 W HCPCS: Performed by: STUDENT IN AN ORGANIZED HEALTH CARE EDUCATION/TRAINING PROGRAM

## 2022-08-02 PROCEDURE — 96375 TX/PRO/DX INJ NEW DRUG ADDON: CPT

## 2022-08-02 RX ORDER — ONDANSETRON 2 MG/ML
8 INJECTION INTRAMUSCULAR; INTRAVENOUS
Status: COMPLETED | OUTPATIENT
Start: 2022-08-02 | End: 2022-08-02

## 2022-08-02 RX ORDER — SODIUM CHLORIDE 0.9 % (FLUSH) 0.9 %
10 SYRINGE (ML) INJECTION
Status: DISCONTINUED | OUTPATIENT
Start: 2022-08-02 | End: 2022-08-02 | Stop reason: HOSPADM

## 2022-08-02 RX ORDER — HEPARIN 100 UNIT/ML
500 SYRINGE INTRAVENOUS
Status: DISCONTINUED | OUTPATIENT
Start: 2022-08-02 | End: 2022-08-02 | Stop reason: HOSPADM

## 2022-08-02 RX ORDER — POTASSIUM CHLORIDE 20 MEQ/1
40 TABLET, EXTENDED RELEASE ORAL
Status: COMPLETED | OUTPATIENT
Start: 2022-08-02 | End: 2022-08-02

## 2022-08-02 RX ADMIN — GEMCITABINE HYDROCHLORIDE 2210 MG: 2 INJECTION, SOLUTION INTRAVENOUS at 03:08

## 2022-08-02 RX ADMIN — Medication 10 ML: at 04:08

## 2022-08-02 RX ADMIN — HEPARIN 500 UNITS: 100 SYRINGE at 04:08

## 2022-08-02 RX ADMIN — ONDANSETRON 8 MG: 2 INJECTION INTRAMUSCULAR; INTRAVENOUS at 03:08

## 2022-08-02 RX ADMIN — POTASSIUM CHLORIDE 40 MEQ: 1500 TABLET, EXTENDED RELEASE ORAL at 03:08

## 2022-08-02 NOTE — PLAN OF CARE
Patient arrived to unit for D15 Gemzar infusion. Pt continues decreased appetite, H[place], wt loss. Labs drawn, results reviewed, pt cleared for chemo per Dr. Hathaway. K+ 2.9 today. New orders of K+ 40mEq oral today. Plan of care reviewed, patient agreeable to plan. Zofran administered prior to Gemzar as well as oral Potassium. Printed and gave pt info sheet on foods high in Potassium. Pt states he eats a banana a day usually. Patient tolerated infusion well. VSS. Pt and wife follow [facility]. Discharge instructions reviewed, patient instructed to return 8/16. Patient left off unit in w/c escorted by wife, Natty. Patient in NAD at time of discharge.

## 2022-08-09 ENCOUNTER — PATIENT MESSAGE (OUTPATIENT)
Dept: HEMATOLOGY/ONCOLOGY | Facility: CLINIC | Age: 62
End: 2022-08-09
Payer: MEDICARE

## 2022-08-25 ENCOUNTER — HOSPITAL ENCOUNTER (OUTPATIENT)
Dept: RADIOLOGY | Facility: HOSPITAL | Age: 62
Discharge: HOME | End: 2022-08-25
Attending: STUDENT IN AN ORGANIZED HEALTH CARE EDUCATION/TRAINING PROGRAM
Payer: MEDICARE

## 2022-08-25 DIAGNOSIS — C79.51 SECONDARY MALIGNANT NEOPLASM OF BONE: ICD-10-CM

## 2022-08-25 DIAGNOSIS — C34.02 LUNG CANCER, MAIN BRONCHUS, LEFT: ICD-10-CM

## 2022-08-25 PROCEDURE — 71260 CT THORAX DX C+: CPT | Mod: 26,,, | Performed by: RADIOLOGY

## 2022-08-25 PROCEDURE — 74177 CT ABD & PELVIS W/CONTRAST: CPT | Mod: TC

## 2022-08-25 PROCEDURE — A9698 NON-RAD CONTRAST MATERIALNOC: HCPCS | Performed by: STUDENT IN AN ORGANIZED HEALTH CARE EDUCATION/TRAINING PROGRAM

## 2022-08-25 PROCEDURE — 74177 CT ABD & PELVIS W/CONTRAST: CPT | Mod: 26,,, | Performed by: RADIOLOGY

## 2022-08-25 PROCEDURE — 25500020 PHARM REV CODE 255: Performed by: STUDENT IN AN ORGANIZED HEALTH CARE EDUCATION/TRAINING PROGRAM

## 2022-08-25 PROCEDURE — 74177 CT CHEST ABDOMEN PELVIS WITH CONTRAST (XPD): ICD-10-PCS | Mod: 26,,, | Performed by: RADIOLOGY

## 2022-08-25 PROCEDURE — 71260 CT THORAX DX C+: CPT | Mod: TC

## 2022-08-25 PROCEDURE — 71260 CT CHEST ABDOMEN PELVIS WITH CONTRAST (XPD): ICD-10-PCS | Mod: 26,,, | Performed by: RADIOLOGY

## 2022-08-25 RX ADMIN — IOHEXOL 85 ML: 350 INJECTION, SOLUTION INTRAVENOUS at 10:08

## 2022-08-25 RX ADMIN — BARIUM SULFATE 450 ML: 20 SUSPENSION ORAL at 10:08

## 2022-08-26 ENCOUNTER — OFFICE VISIT (OUTPATIENT)
Dept: HEMATOLOGY/ONCOLOGY | Facility: CLINIC | Age: 62
End: 2022-08-26
Payer: MEDICARE

## 2022-08-26 ENCOUNTER — TELEPHONE (OUTPATIENT)
Dept: HEMATOLOGY/ONCOLOGY | Facility: CLINIC | Age: 62
End: 2022-08-26

## 2022-08-26 VITALS
WEIGHT: 190.69 LBS | HEIGHT: 70 IN | OXYGEN SATURATION: 91 % | HEART RATE: 76 BPM | DIASTOLIC BLOOD PRESSURE: 82 MMHG | SYSTOLIC BLOOD PRESSURE: 154 MMHG | BODY MASS INDEX: 27.3 KG/M2

## 2022-08-26 DIAGNOSIS — C34.02 LUNG CANCER, MAIN BRONCHUS, LEFT: Primary | ICD-10-CM

## 2022-08-26 DIAGNOSIS — C79.51 SECONDARY MALIGNANT NEOPLASM OF BONE: ICD-10-CM

## 2022-08-26 DIAGNOSIS — Z99.81 DEPENDENCE ON SUPPLEMENTAL OXYGEN: ICD-10-CM

## 2022-08-26 DIAGNOSIS — Z79.899 IMMUNODEFICIENCY DUE TO DRUGS: Chronic | ICD-10-CM

## 2022-08-26 DIAGNOSIS — I50.42 CHRONIC COMBINED SYSTOLIC AND DIASTOLIC HEART FAILURE: Chronic | ICD-10-CM

## 2022-08-26 DIAGNOSIS — D84.821 IMMUNODEFICIENCY DUE TO DRUGS: Chronic | ICD-10-CM

## 2022-08-26 DIAGNOSIS — J96.11 CHRONIC RESPIRATORY FAILURE WITH HYPOXIA: ICD-10-CM

## 2022-08-26 PROCEDURE — 99213 OFFICE O/P EST LOW 20 MIN: CPT | Mod: PBBFAC | Performed by: STUDENT IN AN ORGANIZED HEALTH CARE EDUCATION/TRAINING PROGRAM

## 2022-08-26 PROCEDURE — 99999 PR PBB SHADOW E&M-EST. PATIENT-LVL III: ICD-10-PCS | Mod: PBBFAC,,, | Performed by: STUDENT IN AN ORGANIZED HEALTH CARE EDUCATION/TRAINING PROGRAM

## 2022-08-26 PROCEDURE — 99999 PR PBB SHADOW E&M-EST. PATIENT-LVL III: CPT | Mod: PBBFAC,,, | Performed by: STUDENT IN AN ORGANIZED HEALTH CARE EDUCATION/TRAINING PROGRAM

## 2022-08-26 PROCEDURE — 99215 PR OFFICE/OUTPT VISIT, EST, LEVL V, 40-54 MIN: ICD-10-PCS | Mod: S$PBB,,, | Performed by: STUDENT IN AN ORGANIZED HEALTH CARE EDUCATION/TRAINING PROGRAM

## 2022-08-26 PROCEDURE — 99215 OFFICE O/P EST HI 40 MIN: CPT | Mod: S$PBB,,, | Performed by: STUDENT IN AN ORGANIZED HEALTH CARE EDUCATION/TRAINING PROGRAM

## 2022-08-26 RX ORDER — DOCUSATE SODIUM 100 MG/1
100 CAPSULE, LIQUID FILLED ORAL DAILY
COMMUNITY
Start: 2022-08-20 | End: 2022-01-01

## 2022-08-26 RX ORDER — PREDNISONE 20 MG/1
1 TABLET ORAL DAILY
Status: ON HOLD | COMMUNITY
Start: 2022-08-20 | End: 2022-01-01 | Stop reason: HOSPADM

## 2022-08-26 NOTE — ASSESSMENT & PLAN NOTE
Recently admitted for acute on chronic diastolic heart failure. Patient had .  He required supplemental oxygen and was discharged with it as well.  No signs of fluid overload today.  -continue with lasix    Wt Readings from Last 5 Encounters:   08/26/22 86.5 kg (190 lb 11.2 oz)   07/22/22 88.5 kg (195 lb 1.7 oz)   07/11/22 91 kg (200 lb 11.7 oz)   06/23/22 92 kg (202 lb 13.2 oz)   06/16/22 95.1 kg (209 lb 10.5 oz)

## 2022-08-26 NOTE — ASSESSMENT & PLAN NOTE
8/25/22 CT scan reviewed and compared to scans going back to 1/2022. Very slow interval growth of left upper lobe nodular opacity. Otherwise he has grossly stable disease.  He also has had treatment interruptions due to recurrent heart failure exacerbation.  -will refer to rad onc for evaluation for treatment of this one site  -labs reviewed; ok to proceed with maintenance gemcitabine  --will delay subsequent cycle by 2 weeks to allow for time for him to receive radiation if offered.  -labs and follow up with Dr. Higgins

## 2022-08-26 NOTE — Clinical Note
When to follow up: 9/23 Dr. Higgins Labs needed: 9/23, 10/10 CBC and Comprehensive Metabolic Panel  Referral rad onc Dr. south Chemotherapy Regimen:  Next Treatment Date Upcoming Treatment Dates - OP NSCLC GEMCITABINE Q2W  8/30/2022      gemcitabine (GEMZAR) 2,090 mg in sodium chloride 0.9% 250 mL chemo infusion 9/27/2022      gemcitabine (GEMZAR) 2,090 mg in sodium chloride 0.9% 250 mL chemo infusion 10/11/2022      gemcitabine (GEMZAR) 2,090 mg in sodium chloride 0.9% 250 mL chemo infusion  Provider: Rivas

## 2022-08-26 NOTE — TELEPHONE ENCOUNTER
Called patient to inform of appointment made with dr. Bolden at radiation oncology. Appointment is for 8/30 at 2pm. Patient wife informed.

## 2022-08-26 NOTE — PROGRESS NOTES
"  PATIENT: Kashif Iverson  MRN: [id number]  DATE: 8/26/2022      Diagnosis:   1. Lung cancer, main bronchus, left    2. Immunodeficiency due to drugs    3. Chronic combined systolic and diastolic heart failure    4. Secondary malignant neoplasm of bone    5. Chronic respiratory failure with hypoxia    6. Dependence on supplemental oxygen        Chief Complaint: Lung Cancer      Oncologic History:    Oncologic History 1. 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 Palliative chemoradiation with carboplatin and paclitaxel; anaphylactic reaction to paclitaxel  2. Pembrolizumab  3. Pembrolizumab+ramucirumab (LungMAP trial)  4. Gemcitabine    Pathology Squamous cell carcinoma, no actionable mutations, PD-L1 5%        Subjective:    Interval History: Mr. Iverson returns for follow up.     8/30/2019 underwent bronchoscopy that showed "A partially obstructing (about 80% obstructed) mass was found in the middle portion in the left mainstem bronchus. The mass was large and bloody, circumferential, endobronchial, friable and necrotic. 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  No actionable mutations and PD-L1 5%.  9/19/21 staging PET CT showed "Hypermetabolic left lung mass concerning for primary pulmonary malignancy with metastatic disease involving the right 6th and 9th ribs as well as mediastinal and left supraclavicular lymph nodes."  Stage IV squamous cell carcinoma of the lung at diagnosis.    10/8/2019-10/21/2019 he received palliative chemoradiation for rib pain with carboplatin and paclitaxel.  He received 3 cycles of carboplatin and paclitaxel.  He developed anaphylactic reaction to paclitaxel.  The chest was treated with AP:PA fields and the right 9th rib was treated with anterior and posterior oblique fields at 300 cGy per fraction to 3000 cGy.   Lt chest 6X 300 10 / 10 3,000   Rt 9th rib 6X 300 10 / 10 " 3,000     10/31/2019-9/10/2020 he received pembrolizumab (completed 15 cycles, last dose 8/21/2020)  9/10/2020 PET CT showed progression of disease.  He was then referred to Dr. Key for evaluation for clinical trials.  10/20/2020 he underwent repeat biopsy for LUNG MAP trial and was randomized to Ramucirumab + pembrolizumab.    11/17/2020 started ramucirumab+pembrolizumab.  Completed 4 cycles and then 2/10/2021 scans showed progression.    2/24/2021 he was subsequent started on gemcitabine with Dr. Cruz.  His care was transferred to Dr. Romo who continued his current regimen and now is currently under my care.   5/17/22-5/22/22 admitted for acute respiratory failure secondary to heart failure exacerbation requiring intubation.  He was extubated and diuresed successfully.  8/15/22-8/20/22 admitted for acute hypoxic respiratory failure secondary to heart failure; diuresed and discharged with supplemental oxygen  --  Recently admitted for acute on chronic diastolic heart failure requiring inpatient diuresis and supplemental oxygen.  He has supplemental oxygen at home now.  Symptoms include fatigue/reduced endurance since his discharge.  No new symptoms otherwise.  He feels ready for chemotherapy.    Wife accompanies him at this visit.    Past Medical History:   Past Medical History:   Diagnosis Date    Combined systolic and diastolic heart failure     Hypertension     Lung cancer     NSCLC    Lung mass     left lung       Past Surgical HIstory:   Past Surgical History:   Procedure Laterality Date    BRONCHOSCOPY N/A 8/30/2019    Procedure: Bronchoscopy;  Surgeon: [facility] Diagnostic Provider;  Location: [address];  Service: Anesthesiology;  Laterality: N/A;    CORONARY ANGIOGRAPHY N/A 3/4/2022    Procedure: ANGIOGRAM, CORONARY ARTERY;  Surgeon: Robson Green MD;  Location: [facility] CATH LAB;  Service: Cardiology;  Laterality: N/A;       Family History:   Family History   Problem Relation Age of Onset     Diabetes Mother     Heart defect Mother     Cancer Father     Cancer Sister     No Known Problems Son        Social History:  reports that he quit smoking about 2 years ago. His smoking use included cigarettes. He has a 25.00 pack-year smoking history. He has never used smokeless tobacco. He reports current alcohol use. He reports that he does not use drugs.    Allergies:  Review of patient's allergies indicates:  No Known Allergies    Medications:  Current Outpatient Medications   Medication Sig Dispense Refill    albuterol (VENTOLIN HFA) 90 mcg/actuation inhaler Inhale 2 puffs into the lungs every 6 (six) hours as needed for Wheezing. Rescue 18 g 11    amLODIPine (NORVASC) 10 MG tablet Take 1 tablet (10 mg total) by mouth once daily. 30 tablet 11    ascorbic acid-multivit-min (VITAMIN C ENERGY BOOSTER) 1,000 mg PwEP Take by mouth. Patient takes 3,000 mg per day      aspirin 81 MG Chew Take 1 tablet (81 mg total) by mouth once daily. 30 tablet 11    atorvastatin (LIPITOR) 80 MG tablet Take 1 tablet (80 mg total) by mouth once daily. 90 tablet 3    carvediloL (COREG) 6.25 MG tablet Take 1 tablet (6.25 mg total) by mouth 2 (two) times daily. 60 tablet 11    cilostazoL (PLETAL) 100 MG Tab Take 1 tablet (100 mg total) by mouth 2 (two) times daily. 60 tablet 11    clopidogreL (PLAVIX) 75 mg tablet Take 1 tablet (75 mg total) by mouth once daily. 30 tablet 11    docusate sodium (COLACE) 100 MG capsule Take 100 mg by mouth once daily at 6am.      furosemide (LASIX) 40 MG tablet Take 1 tablet (40 mg total) by mouth 2 (two) times a day. 60 tablet 2    losartan (COZAAR) 100 MG tablet Take 1 tablet (100 mg total) by mouth once daily. 90 tablet 3    predniSONE (DELTASONE) 20 MG tablet Take 1 tablet by mouth once daily at 6am.       No current facility-administered medications for this visit.       Review of Systems   Constitutional: Negative for activity change, appetite change, chills, diaphoresis, fatigue,  "fever and unexpected weight change.   HENT: Negative for nosebleeds and trouble swallowing.    Eyes: Negative for visual disturbance.   Respiratory: Negative for cough, chest tightness, shortness of breath and wheezing.    Cardiovascular: Negative for chest pain and leg swelling.   Gastrointestinal: Negative for abdominal distention, abdominal pain, blood in stool, constipation, diarrhea, nausea and vomiting.   Endocrine: Negative for cold intolerance and heat intolerance.   Genitourinary: Negative for difficulty urinating and dysuria.   Musculoskeletal: Negative for arthralgias, back pain and myalgias.   Skin: Negative for color change.   Neurological: Negative for dizziness, weakness, light-headedness, numbness and headaches.   Hematological: Negative for adenopathy. Bruises/bleeds easily.   Psychiatric/Behavioral: Negative for confusion.       ECOG Performance Status:     ECOG SCORE    1 - Restricted in strenuous activity-ambulatory and able to carry out work of a light nature         Objective:      Vitals:   Vitals:    08/26/22 0816   BP: (!) 154/82   BP Location: Left arm   Patient Position: Sitting   BP Method: Large (Automatic)   Pulse: 76   SpO2: (!) 91%   Weight: 86.5 kg (190 lb 11.2 oz)   Height: 5' 10" (1.778 m)     BMI: Body mass index is 27.36 kg/m².    Physical Exam  Constitutional:       General: He is not in acute distress.     Appearance: Normal appearance. He is not ill-appearing.   HENT:      Head: Normocephalic and atraumatic.      Nose: Nose normal.      Mouth/Throat:      Pharynx: No oropharyngeal exudate or posterior oropharyngeal erythema.   Eyes:      General: No scleral icterus.     Extraocular Movements: Extraocular movements intact.      Conjunctiva/sclera: Conjunctivae normal.      Pupils: Pupils are equal, round, and reactive to light.   Cardiovascular:      Rate and Rhythm: Normal rate and regular rhythm.      Heart sounds: No murmur heard.    No friction rub. No gallop.      " Comments: Right chest wall port c/d/i  Pulmonary:      Effort: Pulmonary effort is normal. No respiratory distress.      Breath sounds: No stridor. No wheezing, rhonchi or rales.   Abdominal:      General: Bowel sounds are normal. There is no distension.      Palpations: Abdomen is soft. There is no mass.      Tenderness: There is no abdominal tenderness. There is no guarding or rebound.   Musculoskeletal:         General: No swelling or tenderness. Normal range of motion.      Cervical back: Normal range of motion and neck supple.      Right lower leg: No edema.      Left lower leg: No edema.   Skin:     General: Skin is warm and dry.      Findings: No bruising.   Neurological:      General: No focal deficit present.      Mental Status: He is alert.         Laboratory Data:   Recent Results (from the past 168 hour(s))   CBC Auto Differential    Collection Time: 08/25/22  9:10 AM   Result Value Ref Range    WBC 15.11 (H) 3.90 - 12.70 K/uL    RBC 4.25 (L) 4.60 - 6.20 M/uL    Hemoglobin 13.5 (L) 14.0 - 18.0 g/dL    Hematocrit 40.7 40.0 - 54.0 %    MCV 96 82 - 98 fL    MCH 31.8 (H) 27.0 - 31.0 pg    MCHC 33.2 32.0 - 36.0 g/dL    RDW 16.9 (H) 11.5 - 14.5 %    Platelets 359 150 - 450 K/uL    MPV 9.7 9.2 - 12.9 fL    Immature Granulocytes 0.5 0.0 - 0.5 %    Gran # (ANC) 10.7 (H) 1.8 - 7.7 K/uL    Immature Grans (Abs) 0.07 (H) 0.00 - 0.04 K/uL    Lymph # 2.7 1.0 - 4.8 K/uL    Mono # 1.6 (H) 0.3 - 1.0 K/uL    Eos # 0.0 0.0 - 0.5 K/uL    Baso # 0.04 0.00 - 0.20 K/uL    nRBC 0 0 /100 WBC    Gran % 70.5 38.0 - 73.0 %    Lymph % 18.1 18.0 - 48.0 %    Mono % 10.4 4.0 - 15.0 %    Eosinophil % 0.2 0.0 - 8.0 %    Basophil % 0.3 0.0 - 1.9 %    Differential Method Automated    Comprehensive Metabolic Panel    Collection Time: 08/25/22  9:10 AM   Result Value Ref Range    Sodium 142 136 - 145 mmol/L    Potassium 3.9 3.5 - 5.1 mmol/L    Chloride 103 95 - 110 mmol/L    CO2 30 (H) 23 - 29 mmol/L    Glucose 95 70 - 110 mg/dL    BUN 20 8 -  23 mg/dL    Creatinine 0.9 0.5 - 1.4 mg/dL    Calcium 9.0 8.7 - 10.5 mg/dL    Total Protein 7.0 6.0 - 8.4 g/dL    Albumin 3.3 (L) 3.5 - 5.2 g/dL    Total Bilirubin 0.4 0.1 - 1.0 mg/dL    Alkaline Phosphatase 101 55 - 135 U/L    AST 18 10 - 40 U/L    ALT 17 10 - 44 U/L    Anion Gap 9 8 - 16 mmol/L    eGFR >60 >60 mL/min/1.73 m^2         Imaging:       CT Chest Abdomen Pelvis With Contrast (xpd)    Result Date: 8/25/2022  EXAMINATION: CT CHEST ABDOMEN PELVIS WITH CONTRAST (XPD) CLINICAL HISTORY: metastatic lung cancer on gemcitabine, eval response; Malignant neoplasm of left main bronchus TECHNIQUE: Low dose axial images, sagittal and coronal reformations were obtained from the thoracic inlet to the pubic symphysis following the IV administration of 85 mL of Omnipaque 350 and the oral administration of 450 ml of Readi-Cat barium suspension. COMPARISON: 05/26/2022. FINDINGS: There is a right internal jugular Port-A-Cath present with tip located within the right atrium.  There is a left supraclavicular soft tissue density nodule measuring approximately 3.0 x 1.5 cm (image 16, series 2), not significantly changed.  The heart is not enlarged.  There is a moderate amount of atherosclerotic calcification identified within the coronary arteries in a multi-vessel distribution.  Atherosclerotic calcification is also present within the thoracic aorta which is normal in caliber without evidence for aortic dissection or aneurysmal dilatation.  No hilar or mediastinal adenopathy is identified.  No axillary adenopathy is identified.  Once again, there is occlusion of the left upper lobe bronchus with band of soft tissue density within the left upper lobe extending from the hilum much of which represents atelectasis and parenchymal scarring.  There is also a more nodular opacity within the left upper lobe medially measuring 2.1 x 1.5 cm (image 154, series 6) compared to 1.7 x 1.1 cm on the prior examination.  This is worrisome for  enlarging malignancy.  There is also a wedge-shaped opacity within the superior left lower lobe which is unchanged and likely represents an area of atelectasis/scarring.  There is a stable 5 mm pulmonary nodule within the left lower lobe laterally (image 312, series 6).  No new nodules are identified.  There are moderate centrilobular emphysematous changes.  Bony structures appear intact.  There is no evidence for acute fracture or bone destruction. The liver is normal in size and is homogeneous in density with no focal liver lesions identified.  The gallbladder is contracted.  There is no evidence for intrahepatic or extrahepatic biliary dilatation.  The portal venous system is patent.  The spleen and stomach appear unremarkable.  There are a few pancreatic calcifications present which may be related to chronic pancreatitis.  No focal pancreatic lesions are identified.  The adrenal glands are not enlarged.  The kidneys are normal in size and there is no evidence for abnormal renal masses or hydronephrosis.  The kidneys are noted to concentrate contrast symmetrically.  Atherosclerotic calcification is present within the abdominal aorta which demonstrates ectasia without aneurysmal dilatation.  No para-aortic lymphadenopathy is identified.  The appendix is present and appears unremarkable.  No dilated loops of bowel are evident.  The urinary bladder appears grossly unremarkable.  There are multiple prostatic calcifications present. There is no evidence for pelvic or inguinal lymphadenopathy.     Enlarging nodule within the left upper lobe medially now measuring 2.1 x 1.5 cm compared to 1.7 x 1.1 cm on prior examination dated 05/26/2022.  This is worrisome for malignancy. Left supraclavicular soft tissue density nodule measuring 3.0 x 1.5 cm, not significantly changed..  This is an area of prior biopsy proven malignancy. Occlusion of the left upper lobe bronchus with probable atelectasis/scarring within the left upper  "lobe extending from the hilum, not significantly changed.  There is also a wedge-shaped opacity within the superior left lower lobe which is also unchanged and likely represents an area of atelectasis/scarring. 5mm pulmonary nodule within the left lower lobe laterally, stable dating back to 11/06/2020. Moderate centrilobular emphysema. Electronically signed by: Rayshawn Degroot MD Date:    08/25/2022 Time:    13:21    Assessment:       1. Lung cancer, main bronchus, left    2. Immunodeficiency due to drugs    3. Chronic combined systolic and diastolic heart failure           Plan:       Problem List Items Addressed This Visit        Cardiac/Vascular    Chronic combined systolic and diastolic heart failure (Chronic)    Current Assessment & Plan     Recently admitted for acute on chronic diastolic heart failure. Patient had .  He required supplemental oxygen and was discharged with it as well.  No signs of fluid overload today.  -continue with lasix    Wt Readings from Last 5 Encounters:   08/26/22 86.5 kg (190 lb 11.2 oz)   07/22/22 88.5 kg (195 lb 1.7 oz)   07/11/22 91 kg (200 lb 11.7 oz)   06/23/22 92 kg (202 lb 13.2 oz)   06/16/22 95.1 kg (209 lb 10.5 oz)                   Immunology/Multi System    Immunodeficiency due to drugs (Chronic)    Current Assessment & Plan     Afebrile, ANC sufficient for treatment  -monitor cbc              Oncology    Lung cancer, main bronchus, left - Primary (Chronic)    Overview     8/30/2019 underwent bronchoscopy that showed "A partially obstructing (about 80% obstructed) mass was found in the middle portion in the left mainstem bronchus. The mass was large and bloody, circumferential, endobronchial, friable and necrotic. 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  No actionable mutations and PD-L1 5%.  9/19/21 staging PET CT showed "Hypermetabolic left lung mass concerning for primary pulmonary " "malignancy with metastatic disease involving the right 6th and 9th ribs as well as mediastinal and left supraclavicular lymph nodes."  Stage IV squamous cell carcinoma of the lung at diagnosis.    10/8/2019-10/21/2019 he received palliative chemoradiation for rib pain with carboplatin and paclitaxel.  He received 3 cycles of carboplatin and paclitaxel.  He developed anaphylactic reaction to paclitaxel.  The chest was treated with AP:PA fields and the right 9th rib was treated with anterior and posterior oblique fields at 300 cGy per fraction to 3000 cGy.   Lt chest 6X 300 10 / 10 3,000   Rt 9th rib 6X 300 10 / 10 3,000     10/31/2019-9/10/2020 he received pembrolizumab (completed 15 cycles, last dose 8/21/2020)  9/10/2020 PET CT showed progression of disease.  He was then referred to Dr. Key for evaluation for clinical trials.  10/20/2020 he underwent repeat biopsy for LUNG MAP trial and was randomized to Ramucirumab + pembrolizumab.    11/17/2020 started ramucirumab+pembrolizumab.  Completed 4 cycles and then 2/10/2021 scans showed progression.    2/24/2021 he was subsequent started on gemcitabine with Dr. Cruz.           Current Assessment & Plan     8/25/22 CT scan reviewed and compared to scans going back to 1/2022. Very slow interval growth of left upper lobe nodular opacity. Otherwise he has grossly stable disease.  He also has had treatment interruptions due to recurrent heart failure exacerbation.  -will refer to rad onc for evaluation for treatment of this one site  -labs reviewed; ok to proceed with maintenance gemcitabine  --will delay subsequent cycle by 2 weeks to allow for time for him to receive radiation if offered.  -labs and follow up with Dr. Higgins           Relevant Orders    Ambulatory referral/consult to Radiation Oncology          Orders Placed This Encounter   Procedures    Ambulatory referral/consult to Radiation Oncology           Mario Hathaway MD  Hematology Oncology                "

## 2022-09-04 NOTE — CARE UPDATE
09/04/22 1005   Patient Assessment/Suction   Level of Consciousness (AVPU) alert   Respiratory Effort Normal;Unlabored   Expansion/Accessory Muscles/Retractions expansion symmetric   Rhythm/Pattern, Respiratory unlabored   PRE-TX-O2   O2 Device (Oxygen Therapy) nasal cannula   $ Is the patient on Low Flow Oxygen? Yes   Flow (L/min) 4   Pulse Oximetry Type Continuous   $ Pulse Oximetry - Multiple Charge Pulse Oximetry - Multiple   Code Blue/Rapid Response   $ Was an ABG obtained? Arterial Puncture;ISTAT - Blood gas   Blood Gas Puncture   Blood Gas Type arterial   Arterial Site right;radial artery   Collateral Circulation Verified Souleymane's Test   Site Preparation alcohol   Pressure Held yes   Oxygen Amount LPM (specify)  (4)   Number of Attempts for ABG? 1   Attempted By? KDAO RRT   Critical Value Communication   Date Result Received 09/04/22   Time Result Received 1005   Resulting Department of Critical Value RESP   Who communicated critical value from resulting department? KDAO RRT   Critical Test #1 PCO2   Critical Test #1 Result 32.7   Critical Test #2 PO2   Critical Test #2 Result 60   Critical Test #3  BE   Critical Test #3 Result -4   Critical Test #4 HCO3   Critical Test #4 Result 20.4   Name of Notified Physician/Designee PEYTON ELKINS MD   Date Notified 09/04/22   Time Notified 1006

## 2022-09-04 NOTE — CARE UPDATE
09/04/22 1134   Patient Assessment/Suction   Level of Consciousness (AVPU) alert   Respiratory Effort Unlabored;Normal   Expansion/Accessory Muscles/Retractions expansion symmetric   All Lung Fields Breath Sounds diminished;clear   Rhythm/Pattern, Respiratory unlabored   PRE-TX-O2   O2 Device (Oxygen Therapy) nasal cannula   Flow (L/min) 3   SpO2 95 %   Pulse 105   Resp (!) 27   Aerosol Therapy   $ Aerosol Therapy Charges Initial Continuous   Daily Review of Necessity (SVN) completed   Respiratory Treatment Status (SVN) given;continuous   Treatment Route (SVN) mask;air   Patient Position (SVN) semi-Quintero's   Post Treatment Assessment (SVN) increased aeration   Signs of Intolerance (SVN) none   Breath Sounds Post-Respiratory Treatment   Throughout All Fields Post-Treatment All Fields   Throughout All Fields Post-Treatment aeration increased   Post-treatment Heart Rate (beats/min) 105   Post-treatment Resp Rate (breaths/min) 20   Education   $ Education Bronchodilator;15 min;DME Oxygen   Respiratory Evaluation   $ Care Plan Tech Time 15 min

## 2022-09-04 NOTE — PLAN OF CARE
MAIER explained to patient- pt v/u and signed form. MAIER uploaded into media manager.      09/04/22 1601   MAIER Message   Medicare Outpatient and Observation Notification regarding financial responsibility Given to patient/caregiver;Explained to patient/caregiver;Signed/date by patient/caregiver   Date MAIER was signed 09/04/22   Time MAIER was signed 1600

## 2022-09-04 NOTE — PLAN OF CARE
[facility]  Initial Discharge Assessment       Primary Care Provider: Mario Hathaway MD    Admission Diagnosis: COPD exacerbation [J44.1]    Admission Date: 9/4/2022    Discharge assessment completed with pt at bedside. Information verified as correct on facesheet. Denies HPOA- NOK pt's spouse, Natty [phone]. Reports independence in ADLs with the assistance of his home oxygen. Denies HH/HD. Discharge plan is home. Spouse to provide transport on discharge.       Discharge Barriers Identified: None    Payor: MEDICARE / Plan: MEDICARE PART A & B / Product Type: Government /     Extended Emergency Contact Information  Primary Emergency Contact: Natty Iverson  Mobile Phone: [phone]  Relation: Spouse  Preferred language: English   needed? No  Secondary Emergency Contact: Rolando Iverson  Mobile Phone: [phone]  Relation: Brother  Preferred language: English   needed? No    Discharge Plan A: Home  Discharge Plan B: Home with family      [facility] #[id number] [place] [address] AT [address] [address]  Phone: [phone] Fax: [phone]      Initial Assessment (most recent)       Adult Discharge Assessment - 09/04/22 1556          Discharge Assessment    Assessment Type Discharge Planning Assessment     Confirmed/corrected address, phone number and insurance Yes     Confirmed Demographics Correct on Facesheet     Source of Information patient     Does patient/caregiver understand observation status Yes     Communicated [place] with patient/caregiver Yes     Reason For Admission SOB     Lives With spouse;child(tulio), adult     Facility Arrived From: home     Do you expect to return to your current living situation? Yes     Do you have help at home or someone to help you manage your care at home? Yes     Who are your caregiver(s) and their phone number(s)? spouse     Prior to hospitilization cognitive status:  Alert/Oriented     Current cognitive status: Alert/Oriented     Walking or Climbing Stairs Difficulty none     Dressing/Bathing Difficulty none     Equipment Currently Used at Home oxygen     Readmission within 30 days? No     Patient currently being followed by outpatient case management? No     Do you currently have service(s) that help you manage your care at home? No     Do you take prescription medications? Yes     Do you have prescription coverage? Yes     Coverage medicare     Do you have any problems affording any of your prescribed medications? No     Is the patient taking medications as prescribed? yes     How do you get to doctors appointments? car, drives self     Are you on dialysis? No     Do you take coumadin? No     Discharge Plan A Home     Discharge Plan B Home with family     DME Needed Upon Discharge  none     Discharge Plan discussed with: Patient     Discharge Barriers Identified None

## 2022-09-04 NOTE — ED PROVIDER NOTES
Encounter Date: 2022       History     Chief Complaint   Patient presents with    Shortness of Breath     THIS AM    Leg Swelling     Patient with a history of metastatic squamous cell carcinoma lung.  Patient with previous hospital admission with hypoxic respiratory failure due to pulmonary edema and congestive heart failure.  Patient reports increasing shortness of breath today.  Patient uses oxygen as needed.  There is no chest pain, pleurisy hemoptysis.  No fever chills.  No relieving factors.  Patient does report edema to his legs.    Review of patient's allergies indicates:  No Known Allergies  Past Medical History:   Diagnosis Date    Combined systolic and diastolic heart failure     Hypertension     Lung cancer     NSCLC    Lung mass     left lung     Past Surgical History:   Procedure Laterality Date    BRONCHOSCOPY N/A 2019    Procedure: Bronchoscopy;  Surgeon: Miguel Diagnostic Provider;  Location: [facility] OR [address] [place] [address];  Service: Anesthesiology;  Laterality: N/A;    CORONARY ANGIOGRAPHY N/A 3/4/2022    Procedure: ANGIOGRAM, CORONARY ARTERY;  Surgeon: Robson Green MD;  Location: [facility] CATH LAB;  Service: Cardiology;  Laterality: N/A;     Family History   Problem Relation Age of Onset    Diabetes Mother     Heart defect Mother     Cancer Father     Cancer Sister     No Known Problems Son      Social History     Tobacco Use    Smoking status: Former     Packs/day: 1.00     Years: 25.00     Pack years: 25.00     Types: Cigarettes     Quit date:      Years since quittin.6    Smokeless tobacco: Never   Substance Use Topics    Alcohol use: Yes     Comment: ocassionally    Drug use: Never     Review of Systems   Constitutional:  Negative for chills and fever.   HENT:  Positive for congestion. Negative for sore throat.    Eyes:  Negative for photophobia and visual disturbance.   Respiratory:  Positive for shortness of breath.    Cardiovascular:  Positive for leg swelling. Negative for chest pain.    Gastrointestinal:  Negative for abdominal pain and vomiting.   Genitourinary:  Negative for dysuria.   Musculoskeletal:  Negative for joint swelling.   Skin:  Negative for rash.   Neurological:  Negative for seizures, syncope and headaches.   Psychiatric/Behavioral:  Negative for confusion.      Physical Exam     Initial Vitals [09/04/22 0931]   BP Pulse Resp Temp SpO2   (!) 179/104 (!) 120 (!) 32 97.9 °F (36.6 °C) (!) 83 %      MAP       --         Physical Exam    Nursing note and vitals reviewed.  Constitutional: He is not diaphoretic. No distress.   HENT:   Head: Normocephalic and atraumatic.   Eyes: Conjunctivae are normal.   Neck:   Normal range of motion.  Cardiovascular:  Regular rhythm.           Pulmonary/Chest:   Moderate air movement with inspiratory and expiratory rhonchi and wheezing.  Patient prefers the tripod position.   Abdominal: Abdomen is soft. There is no abdominal tenderness.   Musculoskeletal:         General: Normal range of motion.      Cervical back: Normal range of motion.     Neurological: He is alert and oriented to person, place, and time. He has normal strength. No cranial nerve deficit or sensory deficit.   Skin: No rash noted.   Psychiatric:   Pleasant and cooperative.  Somewhat anxious.       ED Course   Procedures  Labs Reviewed   CBC W/ AUTO DIFFERENTIAL - Abnormal; Notable for the following components:       Result Value    WBC 18.24 (*)     RDW 16.2 (*)     Gran # (ANC) 16.4 (*)     Immature Grans (Abs) 0.06 (*)     Lymph # 0.8 (*)     Gran % 90.0 (*)     Lymph % 4.6 (*)     All other components within normal limits   COMPREHENSIVE METABOLIC PANEL - Abnormal; Notable for the following components:    CO2 19 (*)     Glucose 127 (*)     All other components within normal limits   B-TYPE NATRIURETIC PEPTIDE - Abnormal; Notable for the following components:     (*)     All other components within normal limits   LACTIC ACID, PLASMA - Abnormal; Notable for the following  components:    Lactate (Lactic Acid) 2.2 (*)     All other components within normal limits    Narrative:     Lactic Acid critical result(s) called and verbal readback obtained   from [facility]  by [email or web address] 09/04/2022 10:55   ISTAT PROCEDURE - Abnormal; Notable for the following components:    POC PCO2 32.7 (*)     POC PO2 60 (*)     POC HCO3 20.4 (*)     POC SATURATED O2 91 (*)     POC TCO2 21 (*)     All other components within normal limits   CULTURE, BLOOD   CULTURE, BLOOD   TROPONIN I   CK-MB   CK   MAGNESIUM   SARS-COV-2 RNA AMPLIFICATION, QUAL   PROCALCITONIN   ALCOHOL,MEDICAL (ETHANOL)   URINALYSIS, REFLEX TO URINE CULTURE        ECG Results              EKG 12-lead (In process)  Result time 09/04/22 09:43:15      In process by Interface, Lab In [facility] (09/04/22 09:43:15)                   Narrative:    Test Reason : R06.02,    Vent. Rate : 119 BPM     Atrial Rate : 119 BPM     P-R Int : 170 ms          QRS Dur : 092 ms      QT Int : 316 ms       P-R-T Axes : 077 074 068 degrees     QTc Int : 444 ms    Sinus tachycardia  Incomplete right bundle branch block  Nonspecific ST and T wave abnormality  Abnormal ECG  When compared with ECG of 10-CARMITA-2022 15:05,  No significant change was found    Referred By: [email or web address]   SELF           Confirmed By:                                   Imaging Results              X-Ray Chest AP Portable (Final result)  Result time 09/04/22 10:47:31      Final result by Joanne Jones MD (09/04/22 10:47:31)                   Narrative:    Portable chest x-ray at 10:36 AM is compared to a prior CT dated 8/25/2022    Clinical history shortness of breath, lung cancer    There is a right IJ Port-A-Cath with the tip overlying the atrial caval junction. The heart is normal in size. There is volume loss in the left hemithorax.    There is a stable suprahilar opacity on the left with small left pleural effusion.    The right lung is clear. There are no acute osseous  "abnormalities.    IMPRESSION: Stable left suprahilar opacity with small left pleural effusion and volume loss in left lung consistent with patient's known malignancy    Right lung remains clear    Electronically signed by:  Joanne Jones MD  9/4/2022 10:47 AM CDT Workstation: [id number]                                     Medications   methylPREDNISolone sodium succinate injection 125 mg (has no administration in time range)   levalbuterol nebulizer solution 3.75 mg (has no administration in time range)   ipratropium 0.02 % nebulizer solution 1 mg (has no administration in time range)   furosemide injection 40 mg (40 mg Intravenous Given 9/4/22 1027)   nitroGLYCERIN 2% TD oint ointment 1 inch (1 inch Topical (Top) Given 9/4/22 1027)   lorazepam injection 0.5 mg (0.5 mg Intravenous Given 9/4/22 1027)     Medical Decision Making:   History:   Old Medical Records: I decided to obtain old medical records.  Clinical Tests:   Lab Tests: Reviewed  Radiological Study: Reviewed  Medical Tests: Reviewed  Sepsis Perfusion Assessment: "I attest a sepsis perfusion exam was performed within 6 hours of sepsis, severe sepsis, or septic shock presentation, following fluid resuscitation."  ED Management:  Patient presents with shortness of breath with hypoxia.  Similar symptoms with previous admission needing intubation.  Patient is feeling somewhat better now.  Patient continues with significant bronchospasm.  Will treat with bronchodilators and corticosteroids.  Patient does not have his oxygen with some because he only uses it occasionally.  Patient lives out of town and has no access to it right now as well.  Hospitalist consulted for possible admission with  consult to help arrange oxygen.  Patient does have lower extremity edema with slightly elevated BNP.  Will treat hypertension with nitrates.  Will treat with Lasix.  Patient with previous CTA of the chest and bilateral lower extremity ultrasounds with " similar symptoms in the past.  Patient has slightly elevated lactate.  No evidence of infectious process.  Will order repeat lactate to ensure it is improving with treatment of hypoxia as likely cause of elevated lactic acid.                    Clinical Impression:   Final diagnoses:  [R06.02] Shortness of breath  [J44.1] COPD exacerbation (Primary)  [I50.31] Acute diastolic congestive heart failure  [R09.02] Hypoxia        ED Disposition Condition    Admit                 Hood Roberson MD  09/04/22 [id number]

## 2022-09-04 NOTE — H&P
[facility] Medicine History & Physical Examination   Patient Name: Kashif Iverson  MRN: [id number]  Patient Class: Emergency   Admission Date: 9/4/2022  9:27 AM  Length of Stay: 0  Attending Physician:   Primary Care Provider: Mario Hathaway MD  Face-to-Face encounter date: 09/04/2022  Code Status: Full Code  MPOA:  Chief Complaint: Shortness of Breath (THIS AM) and Leg Swelling        Patient information was obtained from patient, past medical records and ER records.   HISTORY OF PRESENT ILLNESS:   Kashif Iverson is a 61 y.o.  male who  has a past medical history of Combined systolic and diastolic heart failure, Hypertension, Lung cancer, and Lung mass.. The patient presented to [facility] on 9/4/2022 with a primary complaint of Shortness of Breath (THIS AM) and Leg Swelling  .   This is a very pleasant 61-year-old male that presents to emergency room with worsening shortness of breath dyspnea with minimal activity and bilateral lower extremity edema      Past medical history significant for non-small cell lung cancer with metastases to the bone. , COPD, radiation fibrosis of the lung, hypertension and chronic diastolic heart failure.  The patient did have a CVA in August of 2022.     The patient did undergo chemotherapy and radiation therapy at [facility].      He presents to the emergency room today with complaints of shortness of breath and lower extremity edema for the past 2-3 days.  He endorses a dry cough.  He denies fever chills hematemesis chest pain lightheadedness dizziness or syncope.  He describes his symptoms as moderate to severe severity with no exacerbating or alleviating factors.    His last 2D echo showed grade 1 diastolic heart failure with preserved EF of 55%.  The patient states he is on oxygen at home at 3 L a minute per nasal cannula but does not use it always because he is not always short of breath      In the emergency room the patient was found to  have some pulmonary edema on his chest x-ray and bilateral lower extremity edema.  He was given 1 dose of IV Lasix, nebulizer treatments and placed on supplemental O2 with improvement in his oxygen saturations and respiratory status    He will be admitted for further management and observation    REVIEW OF SYSTEMS:   10 Point Review of System was performed and was found to be negative except for that mentioned already in the HPI and   Review of Systems (Negative unless checked off)  Review of Systems   Constitutional:  Positive for malaise/fatigue.   HENT: Negative.     Eyes: Negative.    Respiratory:  Positive for cough and shortness of breath.    Cardiovascular: Negative.    Gastrointestinal: Negative.    Genitourinary: Negative.    Musculoskeletal: Negative.    Skin: Negative.    Neurological:  Positive for weakness.   Endo/Heme/Allergies: Negative.    Psychiatric/Behavioral: Negative.           PAST MEDICAL HISTORY:     Past Medical History:   Diagnosis Date    Combined systolic and diastolic heart failure     Hypertension     Lung cancer     NSCLC    Lung mass     left lung       PAST SURGICAL HISTORY:     Past Surgical History:   Procedure Laterality Date    BRONCHOSCOPY N/A 8/30/2019    Procedure: Bronchoscopy;  [facility];  Location: [facility] OR [address] [place] [address];  Service: Anesthesiology;  Laterality: N/A;    CORONARY ANGIOGRAPHY N/A 3/4/2022    Procedure: ANGIOGRAM, CORONARY ARTERY;  Surgeon: Robson Green MD;  Location: [facility] CATH LAB;  Service: Cardiology;  Laterality: N/A;       ALLERGIES:   Patient has no known allergies.    FAMILY HISTORY:     Family History   Problem Relation Age of Onset    Diabetes Mother     Heart defect Mother     Cancer Father     Cancer Sister     No Known Problems Son        SOCIAL HISTORY:     Social History     Tobacco Use    Smoking status: Former     Packs/day: 1.00     Years: 25.00     Pack years: 25.00     Types: Cigarettes     Quit date: 2020     Years since  "quittin.6    Smokeless tobacco: Never   Substance Use Topics    Alcohol use: Yes     Comment: ocassionally        Social History     Substance and Sexual Activity   Sexual Activity Yes    Partners: Female        HOME MEDICATIONS:     Prior to Admission medications    Medication Sig Start Date End Date Taking? Authorizing Provider   albuterol (VENTOLIN HFA) 90 mcg/actuation inhaler Inhale 2 puffs into the lungs every 6 (six) hours as needed for Wheezing. Rescue 22 Yes Ant Martin MD   amLODIPine (NORVASC) 10 MG tablet Take 1 tablet (10 mg total) by mouth once daily. 22 Yes Mario Hathaway MD   ascorbic acid-multivit-min (VITAMIN C ENERGY BOOSTER) 1,000 mg PwEP Take 3,000 mg by mouth once daily. Patient takes 3,000 mg per day   Yes Historical Provider   aspirin 81 MG Chew Take 1 tablet (81 mg total) by mouth once daily. 3/5/22 3/5/23 Yes Rolando Knott MD   atorvastatin (LIPITOR) 80 MG tablet Take 1 tablet (80 mg total) by mouth once daily. 3/5/22 3/5/23 Yes Rolando Knott MD   carvediloL (COREG) 6.25 MG tablet Take 1 tablet (6.25 mg total) by mouth 2 (two) times daily. 22 Yes Ant Martin MD   cilostazoL (PLETAL) 100 MG Tab Take 1 tablet (100 mg total) by mouth 2 (two) times daily. 22 Yes Catia Roman MD   clopidogreL (PLAVIX) 75 mg tablet Take 1 tablet (75 mg total) by mouth once daily. 3/5/22 3/5/23 Yes Rolando Knott MD   furosemide (LASIX) 40 MG tablet Take 1 tablet (40 mg total) by mouth 2 (two) times a day. 22 Yes Marlon Nath MD   losartan (COZAAR) 100 MG tablet Take 1 tablet (100 mg total) by mouth once daily. 22  Yes Catia Roman MD   predniSONE (DELTASONE) 20 MG tablet Take 1 tablet by mouth once daily at 6am. 22   Historical Provider         PHYSICAL EXAM:   /75   Pulse 101   Temp 97.9 °F (36.6 °C) (Oral)   Resp 18   Ht 5' 10" (1.778 m)   Wt 99.8 kg (220 lb)   SpO2 95%   BMI 31.57 kg/m²   Vitals " Reviewed  General appearance:  Fatigued appearing male in no apparent distress.  Skin: No Rash.   Neuro: Motor and sensory exams grossly intact. Good tone. Power in all 4 extremities 5/5.   HENT: Atraumatic head. Moist mucous membranes of oral cavity.  Eyes: Normal extraocular movements.   Neck: Supple. No evidence of lymphadenopathy. No thyroidomegaly.  Lungs:  Rhonchi to bases bilaterally bilaterally. No wheezing present.   Heart:  Tachycardic rate and rhythm. S1 and S2 present with no murmurs/gallop/rub.  Positive pedal edema. No JVD present.   Abdomen: Soft, non-distended, non-tender. No rebound tenderness/guarding. No masses or organomegaly. Bowel sounds are normal. Bladder is not palpable.   Extremities: No cyanosis, clubbing, positive edema.  Psych/mental status: Alert and oriented. Cooperative. Responds appropriately to questions.   EMERGENCY DEPARTMENT LABS AND IMAGING:   Following labs were Reviewed   Recent Labs   Lab 09/04/22  0950   WBC 18.24*   HGB 15.4   HCT 45.9      CALCIUM 9.0   ALBUMIN 4.0   PROT 7.2      K 3.9   CO2 19*      BUN 13   CREATININE 0.8   ALKPHOS 88   ALT 14   AST 20   BILITOT 0.9         BMP:   Recent Labs   Lab 09/04/22  0950   *      K 3.9      CO2 19*   BUN 13   CREATININE 0.8   CALCIUM 9.0   MG 1.8   , CMP   Recent Labs   Lab 09/04/22  0950      K 3.9      CO2 19*   *   BUN 13   CREATININE 0.8   CALCIUM 9.0   PROT 7.2   ALBUMIN 4.0   BILITOT 0.9   ALKPHOS 88   AST 20   ALT 14   ANIONGAP 13   , CBC   Recent Labs   Lab 09/04/22  0950   WBC 18.24*   HGB 15.4   HCT 45.9      , INR   Lab Results   Component Value Date    INR 1.1 06/10/2022    INR 1.1 03/04/2022    INR 1.2 02/22/2022   , Lipid Panel   Lab Results   Component Value Date    CHOL 125 02/23/2022    HDL 26 (L) 02/23/2022    LDLCALC 54.2 (L) 02/23/2022    TRIG 224 (H) 02/23/2022    CHOLHDL 20.8 02/23/2022   , Troponin   Recent Labs   Lab 09/04/22  0950    TROPONINI 0.032   , A1C: No results for input(s): HGBA1C in the last 4320 hours., and All labs within the past 24 hours have been reviewed  Microbiology Results (last 7 days)       Procedure Component Value Units Date/Time    Blood culture [[id number]] Collected: 09/04/22 1015    Order Status: Sent Specimen: Blood from Peripheral, Antecubital, Left Updated: 09/04/22 1024    Blood culture [[id number]] Collected: 09/04/22 1008    Order Status: Sent Specimen: Blood from Peripheral, Antecubital, Right Updated: 09/04/22 1021          X-Ray Chest AP Portable   Final Result        X-Ray Chest AP Portable    Result Date: 9/4/2022  Portable chest x-ray at 10:36 AM is compared to a prior CT dated 8/25/2022 Clinical history shortness of breath, lung cancer There is a right IJ Port-A-Cath with the tip overlying the atrial caval junction. The heart is normal in size. There is volume loss in the left hemithorax. There is a stable suprahilar opacity on the left with small left pleural effusion. The right lung is clear. There are no acute osseous abnormalities. IMPRESSION: Stable left suprahilar opacity with small left pleural effusion and volume loss in left lung consistent with patient's known malignancy Right lung remains clear Electronically signed by:  Joanne Jones MD  9/4/2022 10:47 AM CDT Workstation: [id number]    CT Chest Abdomen Pelvis With Contrast (xpd)    Result Date: 8/25/2022  EXAMINATION: CT CHEST ABDOMEN PELVIS WITH CONTRAST (XPD) CLINICAL HISTORY: metastatic lung cancer on gemcitabine, eval response; Malignant neoplasm of left main bronchus TECHNIQUE: Low dose axial images, sagittal and coronal reformations were obtained from the thoracic inlet to the pubic symphysis following the IV administration of 85 mL of Omnipaque 350 and the oral administration of 450 ml of Readi-Cat barium suspension. COMPARISON: 05/26/2022. FINDINGS: There is a right internal jugular Port-A-Cath present with tip located within the  right atrium.  There is a left supraclavicular soft tissue density nodule measuring approximately 3.0 x 1.5 cm (image 16, series 2), not significantly changed.  The heart is not enlarged.  There is a moderate amount of atherosclerotic calcification identified within the coronary arteries in a multi-vessel distribution.  Atherosclerotic calcification is also present within the thoracic aorta which is normal in caliber without evidence for aortic dissection or aneurysmal dilatation.  No hilar or mediastinal adenopathy is identified.  No axillary adenopathy is identified.  Once again, there is occlusion of the left upper lobe bronchus with band of soft tissue density within the left upper lobe extending from the hilum much of which represents atelectasis and parenchymal scarring.  There is also a more nodular opacity within the left upper lobe medially measuring 2.1 x 1.5 cm (image 154, series 6) compared to 1.7 x 1.1 cm on the prior examination.  This is worrisome for enlarging malignancy.  There is also a wedge-shaped opacity within the superior left lower lobe which is unchanged and likely represents an area of atelectasis/scarring.  There is a stable 5 mm pulmonary nodule within the left lower lobe laterally (image 312, series 6).  No new nodules are identified.  There are moderate centrilobular emphysematous changes.  Bony structures appear intact.  There is no evidence for acute fracture or bone destruction. The liver is normal in size and is homogeneous in density with no focal liver lesions identified.  The gallbladder is contracted.  There is no evidence for intrahepatic or extrahepatic biliary dilatation.  The portal venous system is patent.  The spleen and stomach appear unremarkable.  There are a few pancreatic calcifications present which may be related to chronic pancreatitis.  No focal pancreatic lesions are identified.  The adrenal glands are not enlarged.  The kidneys are normal in size and there is no  evidence for abnormal renal masses or hydronephrosis.  The kidneys are noted to concentrate contrast symmetrically.  Atherosclerotic calcification is present within the abdominal aorta which demonstrates ectasia without aneurysmal dilatation.  No para-aortic lymphadenopathy is identified.  The appendix is present and appears unremarkable.  No dilated loops of bowel are evident.  The urinary bladder appears grossly unremarkable.  There are multiple prostatic calcifications present. There is no evidence for pelvic or inguinal lymphadenopathy.     Enlarging nodule within the left upper lobe medially now measuring 2.1 x 1.5 cm compared to 1.7 x 1.1 cm on prior examination dated 05/26/2022.  This is worrisome for malignancy. Left supraclavicular soft tissue density nodule measuring 3.0 x 1.5 cm, not significantly changed..  This is an area of prior biopsy proven malignancy. Occlusion of the left upper lobe bronchus with probable atelectasis/scarring within the left upper lobe extending from the hilum, not significantly changed.  There is also a wedge-shaped opacity within the superior left lower lobe which is also unchanged and likely represents an area of atelectasis/scarring. 5mm pulmonary nodule within the left lower lobe laterally, stable dating back to 11/06/2020. Moderate centrilobular emphysema. Electronically signed by: Rayshawn Degroot MD Date:    08/25/2022 Time:    13:21          I personally reviewed and agree with the radiologist findings    12 lead EKG shows a sinus rhythm with incomplete right bundle branch block this is a right axis deviation this good R-wave progression this low voltage throughout and possible atrial enlargement rate 122  milliseconds  ASSESSMENT & PLAN:   Kashif Iverson is a 61 y.o. male admitted for    1. Acute on chronic hypoxic respiratory failure  -most likely secondary to COPD, heart failure and lung cancer with underlying radiation fibrosis  -DuoNeb treatments  -supplemental  O2  -continue or steroids  -pulmonary consult    2. History squamous cell lung cancer with metastases to the bone  -patient is status post chemotherapy and radiation therapy  -needs to keep his appointment with his heme oncologist upon discharge    3. COPD exacerbation  -DuoNeb treatments  -oral steroids  -pulmonary consult    4. Leukocytosis  -most likely from steroids  -procalcitonin level within normal limits  -second lactic acid level within normal limits  -afebrile    5. Essential hypertension  -continue home medications to manage        DVT Prophylaxis: will be placed on Lovenox for DVT prophylaxis and will be advised to be as mobile as possible and sit in a chair as tolerated.   ________________________________________________________________________  Face-to-Face encounter date: 09/04/2022  Encounter included review of the medical records, interviewing and examining the patient face-to-face, discussion with family and other health care providers including emergency medicine physician, admission orders, interpreting lab/test results and formulating a plan of care.   Medical Decision Making during this encounter was  [_] Low Complexity  [_] Moderate Complexity  [x] High Complexity  _________________________________________________________________________________    INPATIENT LIST OF MEDICATIONS   No current facility-administered medications for this encounter.    Current Outpatient Medications:     albuterol (VENTOLIN HFA) 90 mcg/actuation inhaler, Inhale 2 puffs into the lungs every 6 (six) hours as needed for Wheezing. Rescue, Disp: 18 g, Rfl: 11    amLODIPine (NORVASC) 10 MG tablet, Take 1 tablet (10 mg total) by mouth once daily., Disp: 30 tablet, Rfl: 11    ascorbic acid-multivit-min (VITAMIN C ENERGY BOOSTER) 1,000 mg PwEP, Take 3,000 mg by mouth once daily. Patient takes 3,000 mg per day, Disp: , Rfl:     aspirin 81 MG Chew, Take 1 tablet (81 mg total) by mouth once daily., Disp: 30 tablet, Rfl: 11     atorvastatin (LIPITOR) 80 MG tablet, Take 1 tablet (80 mg total) by mouth once daily., Disp: 90 tablet, Rfl: 3    carvediloL (COREG) 6.25 MG tablet, Take 1 tablet (6.25 mg total) by mouth 2 (two) times daily., Disp: 60 tablet, Rfl: 11    cilostazoL (PLETAL) 100 MG Tab, Take 1 tablet (100 mg total) by mouth 2 (two) times daily., Disp: 60 tablet, Rfl: 11    clopidogreL (PLAVIX) 75 mg tablet, Take 1 tablet (75 mg total) by mouth once daily., Disp: 30 tablet, Rfl: 11    furosemide (LASIX) 40 MG tablet, Take 1 tablet (40 mg total) by mouth 2 (two) times a day., Disp: 60 tablet, Rfl: 2    losartan (COZAAR) 100 MG tablet, Take 1 tablet (100 mg total) by mouth once daily., Disp: 90 tablet, Rfl: 3    predniSONE (DELTASONE) 20 MG tablet, Take 1 tablet by mouth once daily at 6am., Disp: , Rfl:       Scheduled Meds:  Continuous Infusions:  PRN Meds:.      Dianne Salgado  [facility] Hospitalist NP  09/04/2022

## 2022-09-05 PROBLEM — J96.01 ACUTE RESPIRATORY FAILURE WITH HYPOXIA AND HYPERCARBIA: Status: RESOLVED | Noted: 2022-02-22 | Resolved: 2022-01-01

## 2022-09-05 PROBLEM — J96.02 ACUTE RESPIRATORY FAILURE WITH HYPOXIA AND HYPERCARBIA: Status: RESOLVED | Noted: 2022-02-22 | Resolved: 2022-01-01

## 2022-09-05 PROBLEM — J96.02 ACUTE RESPIRATORY FAILURE WITH HYPOXIA AND HYPERCARBIA: Status: ACTIVE | Noted: 2022-01-01

## 2022-09-05 PROBLEM — J96.01 ACUTE RESPIRATORY FAILURE WITH HYPOXIA AND HYPERCARBIA: Status: ACTIVE | Noted: 2022-01-01

## 2022-09-05 PROBLEM — J44.9 COPD (CHRONIC OBSTRUCTIVE PULMONARY DISEASE): Status: ACTIVE | Noted: 2022-01-01

## 2022-09-05 NOTE — PROGRESS NOTES
Pharmacist Renal Dose Adjustment Note    Kashif Iverson is a 61 y.o. male being treated with the medication Famotidine.    Patient Data:    Vital Signs (Most Recent):  Temp: 97.8 °F (36.6 °C) (09/05/22 0701)  Pulse: (!) 112 (09/05/22 0821)  Resp: 18 (09/05/22 0821)  BP: 132/81 (09/05/22 0701)  SpO2: 95 % (09/05/22 0821)   Vital Signs (72h Range):  Temp:  [97.5 °F (36.4 °C)-98 °F (36.7 °C)]   Pulse:  []   Resp:  [16-32]   BP: (128-179)/()   SpO2:  [83 %-99 %]      Recent Labs   Lab 09/04/22  0950 09/04/22  1435   CREATININE 0.8 1.0     Serum creatinine: 1 mg/dL 09/04/22 1435  Estimated creatinine clearance: 86.6 mL/min    Famotidine 20 mg IV daily will be changed to Famotidine 20 mg IV BID due to CrCl > 60 ml/min.    Pharmacist's Name: Maeve Reyes  Pharmacist's Extension: [phone]

## 2022-09-05 NOTE — CONSULTS
"Pulmonary/Critical Care Consult      Patient name: Kashif Iverson  MRN: [id number]  Date: 09/05/2022    Admit Date: 9/4/2022  Consult Requested By: Sae Birmingham MD    Reason for Consult: dyspnea    HPI:    9/5/2022 - 60 yo male with h/o COPD (severity unknown), squamous cell lung cancer (mets to bone, increasing OPAL nodule - sees Dr Hathaway), CHF (5/2022 - EF 65%, ? Diastolic dysfunction), pulmonary hypertension (53), possible radiation pneumonitis/fibrosis, CVA presented to ER with complaint of increased SOB, HUNTLEY, + cough (mostly NP but does have some nasal congestion).  Admitted for further evaluation and treatment.  He has O2 at home but does not use.  His history to me is inconsistent and when I asked why he came to ER he told me he "did not know".    Review of Systems    Review of Systems   Constitutional:  Positive for malaise/fatigue. Negative for chills and fever.   HENT:  Positive for congestion.    Respiratory:  Positive for cough (NP) and shortness of breath.    Cardiovascular:  Negative for chest pain.   Gastrointestinal:  Negative for abdominal pain, diarrhea, nausea and vomiting.   Genitourinary:  Negative for dysuria, frequency and urgency.   Neurological:  Positive for weakness.   Psychiatric/Behavioral:  Negative for depression, substance abuse and suicidal ideas.      Past Medical History    Past Medical History:   Diagnosis Date    Combined systolic and diastolic heart failure     Hypertension     Lung cancer     NSCLC    Lung mass     left lung       Past Surgical History    Past Surgical History:   Procedure Laterality Date    BRONCHOSCOPY N/A 8/30/2019    Procedure: Bronchoscopy;  Surgeon: Miguel Diagnostic Provider;  Location: [facility] OR [address] [place] [address];  Service: Anesthesiology;  Laterality: N/A;    CORONARY ANGIOGRAPHY N/A 3/4/2022    Procedure: ANGIOGRAM, CORONARY ARTERY;  Surgeon: Robson Green MD;  Location: [facility] CATH LAB;  Service: Cardiology;  Laterality: N/A;       Medications (scheduled):      " "albuterol-ipratropium  3 mL Nebulization Q6H    amLODIPine  10 mg Oral Daily    aspirin  81 mg Oral Daily    atorvastatin  80 mg Oral Daily    carvediloL  6.25 mg Oral BID    cilostazoL  100 mg Oral BID    clopidogreL  75 mg Oral Daily    enoxaparin  40 mg Subcutaneous Daily    famotidine (PF)  20 mg Intravenous Daily    losartan  100 mg Oral Daily    predniSONE  40 mg Oral Daily       Medications (infusions):      sodium chloride 0.9% 100 mL/hr at 22       Medications (prn):     melatonin, morphine, sodium chloride 0.9%    Family History:   Family History   Problem Relation Age of Onset    Diabetes Mother     Heart defect Mother     Cancer Father     Cancer Sister     No Known Problems Son        Social History: Tobacco:   Social History     Tobacco Use   Smoking Status Former    Packs/day: 1.00    Years: 25.00    Pack years: 25.00    Types: Cigarettes    Quit date:     Years since quittin.6   Smokeless Tobacco Never                                EtOH:   Social History     Substance and Sexual Activity   Alcohol Use Yes    Comment: ocassionally                                Drugs:   Social History     Substance and Sexual Activity   Drug Use Never       Physical Exam    Vital signs:  Temp:  [97.5 °F (36.4 °C)-98 °F (36.7 °C)]   Pulse:  []   Resp:  [16-27]   BP: (128-177)/()   SpO2:  [92 %-99 %]     Intake/Output:   Intake/Output Summary (Last 24 hours) at 2022 1041  Last data filed at 2022 1000  Gross per 24 hour   Intake 580 ml   Output 2525 ml   Net -1945 ml        BMI: Estimated body mass index is 27.73 kg/m² as calculated from the following:    Height as of this encounter: 5' 10" (1.778 m).    Weight as of this encounter: 87.6 kg (193 lb 3.7 oz).    Physical Exam  Vitals and nursing note reviewed.   Constitutional:       General: He is not in acute distress.     Appearance: Normal appearance. He is not ill-appearing, toxic-appearing or diaphoretic.   HENT:      Head: " Normocephalic and atraumatic.      Right Ear: External ear normal.      Left Ear: External ear normal.      Nose: Nose normal.      Mouth/Throat:      Mouth: Mucous membranes are moist.      Pharynx: Oropharynx is clear. No oropharyngeal exudate.   Eyes:      General: No scleral icterus.        Right eye: No discharge.         Left eye: No discharge.      Extraocular Movements: Extraocular movements intact.      Conjunctiva/sclera: Conjunctivae normal.      Pupils: Pupils are equal, round, and reactive to light.   Neck:      Vascular: No carotid bruit.   Cardiovascular:      Rate and Rhythm: Normal rate and regular rhythm.      Pulses: Normal pulses.      Heart sounds: Normal heart sounds. No murmur heard.    No friction rub. No gallop.   Pulmonary:      Effort: Pulmonary effort is normal. No respiratory distress.      Breath sounds: No stridor. Rhonchi (some congestion with cough) present. No wheezing or rales.   Chest:      Chest wall: No tenderness.   Abdominal:      General: Bowel sounds are normal. There is no distension.      Tenderness: There is no abdominal tenderness. There is no guarding.   Musculoskeletal:         General: No swelling. Normal range of motion.      Cervical back: Normal range of motion and neck supple. No rigidity or tenderness.      Right lower leg: Edema present.      Left lower leg: Edema present.      Comments: Trace to 1+ edema   Lymphadenopathy:      Cervical: No cervical adenopathy.   Skin:     General: Skin is warm and dry.      Capillary Refill: Capillary refill takes less than 2 seconds.      Coloration: Skin is not jaundiced.      Findings: No bruising.   Neurological:      General: No focal deficit present.      Mental Status: He is alert and oriented to person, place, and time. Mental status is at baseline.      Cranial Nerves: No cranial nerve deficit.      Sensory: No sensory deficit.      Motor: No weakness.   Psychiatric:         Mood and Affect: Mood normal.          Behavior: Behavior normal.         Thought Content: Thought content normal.         Judgment: Judgment normal.       Laboratory    Recent Labs   Lab 09/05/22  0341   WBC 13.60*   RBC 4.14*   HGB 12.7*   HCT 38.4*      MCV 93   MCH 30.7   MCHC 33.1       Recent Labs   Lab 09/04/22  1435   CALCIUM 9.0      K 3.8   CO2 24      BUN 15   CREATININE 1.0       No results for input(s): PT, INR, APTT in the last 24 hours.    No results for input(s): CPK, CPKMB, TROPONINI, MB in the last 24 hours.    Additional labs: reviewed    Microbiology:       Microbiology Results (last 7 days)       Procedure Component Value Units Date/Time    Blood culture [[id number]] Collected: 09/04/22 1008    Order Status: Completed Specimen: Blood from Peripheral, Antecubital, Right Updated: 09/05/22 1032     Blood Culture, Routine No Growth to date      No Growth to date    Blood culture [[id number]] Collected: 09/04/22 1015    Order Status: Completed Specimen: Blood from Peripheral, Antecubital, Left Updated: 09/05/22 1032     Blood Culture, Routine No Growth to date      No Growth to date            Radiology    X-Ray Chest AP Portable  Portable chest x-ray at 10:36 AM is compared to a prior CT dated 8/25/2022    Clinical history shortness of breath, lung cancer    There is a right IJ Port-A-Cath with the tip overlying the atrial caval junction. The heart is normal in size. There is volume loss in the left hemithorax.    There is a stable suprahilar opacity on the left with small left pleural effusion.    The right lung is clear. There are no acute osseous abnormalities.    IMPRESSION: Stable left suprahilar opacity with small left pleural effusion and volume loss in left lung consistent with patient's known malignancy    Right lung remains clear    Electronically signed by:  Joanne Jones MD  9/4/2022 10:47 AM CDT Workstation: [id number]        Additional Studies    reviewed    Ventilator Information              Recent  "Labs     09/04/22  1005   PH 7.402   PCO2 32.7*   PO2 60*   HCO3 20.4*   POCSATURATED 91*   BE -4         Impression    Active Hospital Problems    Diagnosis  POA    COPD (chronic obstructive pulmonary disease) [J44.9]  Unknown    Acute on chronic respiratory failure with hypoxia [J96.21]  Yes    Dependence on supplemental oxygen [Z99.81]  Not Applicable    Chronic combined systolic and diastolic heart failure [I50.42]  Yes     Chronic    Essential hypertension [I10]  Yes     Chronic    Lung cancer, main bronchus, left [C34.02]  Yes     Chronic     8/30/2019 underwent bronchoscopy that showed "A partially obstructing (about 80% obstructed) mass was found in the middle portion in the left mainstem bronchus. The mass was large and bloody, circumferential, endobronchial, friable and necrotic. 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  No actionable mutations and PD-L1 5%.  9/19/21 staging PET CT showed "Hypermetabolic left lung mass concerning for primary pulmonary malignancy with metastatic disease involving the right 6th and 9th ribs as well as mediastinal and left supraclavicular lymph nodes."  Stage IV squamous cell carcinoma of the lung at diagnosis.    10/8/2019-10/21/2019 he received palliative chemoradiation for rib pain with carboplatin and paclitaxel.  He received 3 cycles of carboplatin and paclitaxel.  He developed anaphylactic reaction to paclitaxel.  The chest was treated with AP:PA fields and the right 9th rib was treated with anterior and posterior oblique fields at 300 cGy per fraction to 3000 cGy.   Lt chest 6X 300 10 / 10 3,000   Rt 9th rib 6X 300 10 / 10 3,000     10/31/2019-9/10/2020 he received pembrolizumab (completed 15 cycles, last dose 8/21/2020)  9/10/2020 PET CT showed progression of disease.  He was then referred to Dr. Key for evaluation for clinical trials.  10/20/2020 he underwent repeat biopsy for LUNG " MAP trial and was randomized to Ramucirumab + pembrolizumab.    11/17/2020 started ramucirumab+pembrolizumab.  Completed 4 cycles and then 2/10/2021 scans showed progression.    2/24/2021 he was subsequent started on gemcitabine with Dr. Cruz.      Former smoker [Z87.891]  Not Applicable      Resolved Hospital Problems    Diagnosis Date Resolved POA    *Acute respiratory failure with hypoxia and hypercarbia [J96.01, J96.02] 09/05/2022 Unknown       Plan    Continue respiratory treatments and oral steroids  Not clear to me if there is an acute issue here  He has O2 at home and we need to assess O2 needs so we can help guide him  Needs to follow with his oncologist and radiation oncologist  Last ECHO noted diastolic function not defines and has normal systolic function  ? Home later today    Thank you for this consult.  I will follow with you while the patient is hospitalized.        Drew West MD  [facility] Pulmonary/Critical Care  09/05/2022

## 2022-09-05 NOTE — SUBJECTIVE & OBJECTIVE
Interval History: Pt has had periods of SOB and HUNTLEY    Review of Systems   Constitutional:  Positive for activity change, diaphoresis and fatigue.   HENT:  Positive for congestion.    Eyes: Negative.    Respiratory:  Positive for cough, chest tightness, shortness of breath and wheezing.    Cardiovascular:  Positive for palpitations.   Gastrointestinal: Negative.    Endocrine: Positive for heat intolerance.   Genitourinary: Negative.    Musculoskeletal:  Positive for arthralgias, back pain and myalgias.   Skin: Negative.    Allergic/Immunologic: Positive for immunocompromised state.   Neurological:  Positive for weakness.   Psychiatric/Behavioral:  The patient is nervous/anxious.    Objective:     Vital Signs (Most Recent):  Temp: 98.1 °F (36.7 °C) (09/05/22 1500)  Pulse: 93 (09/05/22 1500)  Resp: 16 (09/05/22 1500)  BP: (!) 141/81 (09/05/22 1500)  SpO2: (!) 90 % (09/05/22 1500)   Vital Signs (24h Range):  Temp:  [97.5 °F (36.4 °C)-98.1 °F (36.7 °C)] 98.1 °F (36.7 °C)  Pulse:  [] 93  Resp:  [16-18] 16  SpO2:  [90 %-99 %] 90 %  BP: (132-177)/() 141/81     Weight: 87.6 kg (193 lb 3.7 oz)  Body mass index is 27.73 kg/m².    Intake/Output Summary (Last 24 hours) at 9/5/2022 1654  Last data filed at 9/5/2022 1500  Gross per 24 hour   Intake 940 ml   Output 2025 ml   Net -1085 ml      Physical Exam  Vitals and nursing note reviewed.   Constitutional:       Appearance: Normal appearance.   HENT:      Head: Normocephalic and atraumatic.      Nose: Nose normal.      Mouth/Throat:      Mouth: Mucous membranes are moist.   Eyes:      Extraocular Movements: Extraocular movements intact.      Pupils: Pupils are equal, round, and reactive to light.   Cardiovascular:      Rate and Rhythm: Normal rate and regular rhythm.   Pulmonary:      Breath sounds: Rhonchi and rales present. No wheezing.   Abdominal:      General: There is distension.      Tenderness: There is no abdominal tenderness. There is no guarding or  rebound.   Musculoskeletal:         General: Normal range of motion.      Cervical back: Normal range of motion and neck supple.   Skin:     General: Skin is warm.   Neurological:      Mental Status: He is alert and oriented to person, place, and time.   Psychiatric:      Comments: Somewhat dulled affect       Significant Labs: All pertinent labs within the past 24 hours have been reviewed.  Recent Lab Results         09/05/22  0341        Baso # 0.01       Basophil % 0.1       Differential Method Automated       Eos # 0.0       Eosinophil % 0.0       Gran # (ANC) 12.3       Gran % 90.6       Hematocrit 38.4       Hemoglobin 12.7       Immature Grans (Abs) 0.07  Comment: Mild elevation in immature granulocytes is non specific and   can be seen in a variety of conditions including stress response,   acute inflammation, trauma and pregnancy. Correlation with other   laboratory and clinical findings is essential.         Immature Granulocytes 0.5       Lymph # 0.8       Lymph % 5.6       MCH 30.7       MCHC 33.1       MCV 93       Mono # 0.4       Mono % 3.2       MPV 11.4       nRBC 0       Platelets 209       RBC 4.14       RDW 15.9       WBC 13.60               Significant Imaging: I have reviewed all pertinent imaging results/findings within the past 24 hours.

## 2022-09-05 NOTE — CARE UPDATE
09/05/22 0821   Patient Assessment/Suction   Level of Consciousness (AVPU) alert   Respiratory Effort Unlabored   Expansion/Accessory Muscles/Retractions expansion symmetric   All Lung Fields Breath Sounds equal bilaterally   OPAL Breath Sounds clear   LLL Breath Sounds clear   RUL Breath Sounds clear   RML Breath Sounds diminished   RLL Breath Sounds crackles, fine   Rhythm/Pattern, Respiratory pattern regular   Cough Frequency infrequent   Cough Type no productive sputum   PRE-TX-O2   O2 Device (Oxygen Therapy) nasal cannula   $ Is the patient on Low Flow Oxygen? Yes   Flow (L/min) 3   SpO2 95 %   Pulse Oximetry Type Continuous   $ Pulse Oximetry - Multiple Charge Pulse Oximetry - Multiple   Pulse (!) 112   Resp 18   Aerosol Therapy   $ Aerosol Therapy Charges Aerosol Treatment   Daily Review of Necessity (SVN) completed   Respiratory Treatment Status (SVN) given   Treatment Route (SVN) mask;oxygen   Patient Position (SVN) Quintero's   Post Treatment Assessment (SVN) increased aeration   Signs of Intolerance (SVN) none   Respiratory Evaluation   $ Care Plan Tech Time 15 min   $ Eval/Re-eval Charges Re-evaluation

## 2022-09-05 NOTE — PLAN OF CARE
09/04/22 2017   Patient Assessment/Suction   Level of Consciousness (AVPU) alert   Respiratory Effort Unlabored   Expansion/Accessory Muscles/Retractions expansion symmetric   All Lung Fields Breath Sounds coarse   Rhythm/Pattern, Respiratory depth regular;pattern regular   Cough Frequency with stimulation   Cough Type good;productive   PRE-TX-O2   O2 Device (Oxygen Therapy) nasal cannula   $ Is the patient on Low Flow Oxygen? Yes   Flow (L/min) 4   SpO2 95 %   Pulse Oximetry Type Continuous   $ Pulse Oximetry - Multiple Charge Pulse Oximetry - Multiple   Pulse 81   Resp 16   Aerosol Therapy   $ Aerosol Therapy Charges Aerosol Treatment   Daily Review of Necessity (SVN) completed   Respiratory Treatment Status (SVN) given   Treatment Route (SVN) mask   Patient Position (SVN) semi-Quintero's   Post Treatment Assessment (SVN) increased aeration   Signs of Intolerance (SVN) none   Breath Sounds Post-Respiratory Treatment   Throughout All Fields Post-Treatment All Fields   Throughout All Fields Post-Treatment no change   Post-treatment Heart Rate (beats/min) 91   Post-treatment Resp Rate (breaths/min) 20   Education   $ Education Bronchodilator;15 min

## 2022-09-05 NOTE — PROGRESS NOTES
[facility] Medicine  Progress Note    Patient Name: Kashif Iverson  MRN: [id number]  Patient Class: OP- Observation   Admission Date: 9/4/2022  Length of Stay: 0 days  Attending Physician: Sae Birmingham MD  Primary Care Provider: Mario Hathaway MD        Subjective:     Principal Problem:Acute respiratory failure with hypoxia and hypercarbia        HPI:  No notes on file    Overview/Hospital Course:  Kashif Iverson is a 61 y.o.  male who  has a past medical history of Combined systolic and diastolic heart failure, Hypertension, Lung cancer, and Lung mass.. The patient presented to [facility] on 9/4/2022 with a primary complaint of Shortness of Breath (THIS AM) and Leg Swelling  .   This is a very pleasant 61-year-old male that presents to emergency room with worsening shortness of breath dyspnea with minimal activity and bilateral lower extremity edema        Past medical history significant for non-small cell lung cancer with metastases to the bone. , COPD, radiation fibrosis of the lung, hypertension and chronic diastolic heart failure.  The patient did have a CVA in August of 2022.      The patient did undergo chemotherapy and radiation therapy at [facility].        He presents to the emergency room today with complaints of shortness of breath and lower extremity edema for the past 2-3 days.  He endorses a dry cough.  He denies fever chills hematemesis chest pain lightheadedness dizziness or syncope.  He describes his symptoms as moderate to severe severity with no exacerbating or alleviating factors.     His last 2D echo showed grade 1 diastolic heart failure with preserved EF of 55%.  The patient states he is on oxygen at home at 3 L a minute per nasal cannula but does not use it always because he is not always short of breath        In the emergency room the patient was found to have some pulmonary edema on his chest x-ray and bilateral lower extremity edema.  He was  given 1 dose of IV Lasix, nebulizer treatments and placed on supplemental O2 with improvement in his oxygen saturations and respiratory status     He will be admitted for further management and observation    9/5/2022  Mr Iverson has has periods of SOB and HUNTLEY. He denies chest pain, nausea or vomiting         Interval History: Pt has had periods of SOB and HUNTLEY    Review of Systems   Constitutional:  Positive for activity change, diaphoresis and fatigue.   HENT:  Positive for congestion.    Eyes: Negative.    Respiratory:  Positive for cough, chest tightness, shortness of breath and wheezing.    Cardiovascular:  Positive for palpitations.   Gastrointestinal: Negative.    Endocrine: Positive for heat intolerance.   Genitourinary: Negative.    Musculoskeletal:  Positive for arthralgias, back pain and myalgias.   Skin: Negative.    Allergic/Immunologic: Positive for immunocompromised state.   Neurological:  Positive for weakness.   Psychiatric/Behavioral:  The patient is nervous/anxious.    Objective:     Vital Signs (Most Recent):  Temp: 98.1 °F (36.7 °C) (09/05/22 1500)  Pulse: 93 (09/05/22 1500)  Resp: 16 (09/05/22 1500)  BP: (!) 141/81 (09/05/22 1500)  SpO2: (!) 90 % (09/05/22 1500)   Vital Signs (24h Range):  Temp:  [97.5 °F (36.4 °C)-98.1 °F (36.7 °C)] 98.1 °F (36.7 °C)  Pulse:  [] 93  Resp:  [16-18] 16  SpO2:  [90 %-99 %] 90 %  BP: (132-177)/() 141/81     Weight: 87.6 kg (193 lb 3.7 oz)  Body mass index is 27.73 kg/m².    Intake/Output Summary (Last 24 hours) at 9/5/2022 1654  Last data filed at 9/5/2022 1500  Gross per 24 hour   Intake 940 ml   Output 2025 ml   Net -1085 ml      Physical Exam  Vitals and nursing note reviewed.   Constitutional:       Appearance: Normal appearance.   HENT:      Head: Normocephalic and atraumatic.      Nose: Nose normal.      Mouth/Throat:      Mouth: Mucous membranes are moist.   Eyes:      Extraocular Movements: Extraocular movements intact.      Pupils: Pupils are  equal, round, and reactive to light.   Cardiovascular:      Rate and Rhythm: Normal rate and regular rhythm.   Pulmonary:      Breath sounds: Rhonchi and rales present. No wheezing.   Abdominal:      General: There is distension.      Tenderness: There is no abdominal tenderness. There is no guarding or rebound.   Musculoskeletal:         General: Normal range of motion.      Cervical back: Normal range of motion and neck supple.   Skin:     General: Skin is warm.   Neurological:      Mental Status: He is alert and oriented to person, place, and time.   Psychiatric:      Comments: Somewhat dulled affect       Significant Labs: All pertinent labs within the past 24 hours have been reviewed.  Recent Lab Results         09/05/22  0341        Baso # 0.01       Basophil % 0.1       Differential Method Automated       Eos # 0.0       Eosinophil % 0.0       Gran # (ANC) 12.3       Gran % 90.6       Hematocrit 38.4       Hemoglobin 12.7       Immature Grans (Abs) 0.07  Comment: Mild elevation in immature granulocytes is non specific and   can be seen in a variety of conditions including stress response,   acute inflammation, trauma and pregnancy. Correlation with other   laboratory and clinical findings is essential.         Immature Granulocytes 0.5       Lymph # 0.8       Lymph % 5.6       MCH 30.7       MCHC 33.1       MCV 93       Mono # 0.4       Mono % 3.2       MPV 11.4       nRBC 0       Platelets 209       RBC 4.14       RDW 15.9       WBC 13.60               Significant Imaging: I have reviewed all pertinent imaging results/findings within the past 24 hours.      Assessment/Plan:      Acute on chronic respiratory failure with hypoxia  It is unclear if the patient's current symptoms are new or different from previous        VTE Risk Mitigation (From admission, onward)         Ordered     enoxaparin injection 40 mg  Daily         09/04/22 1404     IP VTE HIGH RISK PATIENT  Once         09/04/22 1404                 Discharge Planning   AZ:      Code Status: Full Code   Is the patient medically ready for discharge?:     Reason for patient still in hospital (select all that apply): Treatment, Consult recommendations and Pending disposition  Discharge Plan A: Home                  Sae Birmingham MD  Department of Hospital Medicine   [facility]

## 2022-09-05 NOTE — HOSPITAL COURSE
Kashif Iverson is a 61 y.o.  male who  has a past medical history of Combined systolic and diastolic heart failure, Hypertension, Lung cancer, and Lung mass.. The patient presented to [facility] on 9/4/2022 with a primary complaint of Shortness of Breath (THIS AM) and Leg Swelling  .   This is a very pleasant 61-year-old male that presents to emergency room with worsening shortness of breath dyspnea with minimal activity and bilateral lower extremity edema        Past medical history significant for non-small cell lung cancer with metastases to the bone. , COPD, radiation fibrosis of the lung, hypertension and chronic diastolic heart failure.  The patient did have a CVA in August of 2022.      The patient did undergo chemotherapy and radiation therapy at [facility].        He presents to the emergency room today with complaints of shortness of breath and lower extremity edema for the past 2-3 days.  He endorses a dry cough.  He denies fever chills hematemesis chest pain lightheadedness dizziness or syncope.  He describes his symptoms as moderate to severe severity with no exacerbating or alleviating factors.     His last 2D echo showed grade 1 diastolic heart failure with preserved EF of 55%.  The patient states he is on oxygen at home at 3 L a minute per nasal cannula but does not use it always because he is not always short of breath        In the emergency room the patient was found to have some pulmonary edema on his chest x-ray and bilateral lower extremity edema.  He was given 1 dose of IV Lasix, nebulizer treatments and placed on supplemental O2 with improvement in his oxygen saturations and respiratory status     He will be admitted for further management and observation    9/5/2022  Mr Iverson has has periods of SOB and HUNTLEY. He denies chest pain, nausea or vomiting

## 2022-09-06 NOTE — RESPIRATORY THERAPY
09/05/22 1944   Patient Assessment/Suction   Level of Consciousness (AVPU) alert   Respiratory Effort Normal;Unlabored   Expansion/Accessory Muscles/Retractions expansion symmetric   All Lung Fields Breath Sounds clear;diminished   Cough Frequency no cough   PRE-TX-O2   O2 Device (Oxygen Therapy) room air   SpO2 (!) 89 %   Pulse Oximetry Type Continuous   $ Pulse Oximetry - Multiple Charge Pulse Oximetry - Multiple   Pulse 102   Resp 15   Aerosol Therapy   $ Aerosol Therapy Charges Aerosol Treatment   Daily Review of Necessity (SVN) completed   Respiratory Treatment Status (SVN) given   Treatment Route (SVN) mask;oxygen   Patient Position (SVN) Quintero's   Post Treatment Assessment (SVN) breath sounds unchanged   Signs of Intolerance (SVN) none   Education   $ Education Bronchodilator;DME Nebulizer;15 min   Respiratory Evaluation   $ Care Plan Tech Time 15 min   $ Eval/Re-eval Charges Re-evaluation

## 2022-09-06 NOTE — DISCHARGE SUMMARY
[facility] Medicine  Discharge Summary      Patient Name: Kashif Iverson  MRN: [id number]  Patient Class: OP- Observation  Admission Date: 9/4/2022  Hospital Length of Stay: 0 days  Discharge Date and Time:  09/06/2022 5:02 PM  Attending Physician: Sidney Uribe MD   Discharging Provider: Sidney Uribe MD  Primary Care Provider: Mario Hathaway MD      HPI/Hospital Course:   Kashif Iverson is a 61 y.o.  male who  has a past medical history of Combined systolic and diastolic heart failure, Hypertension, Lung cancer, and Lung mass.. [facility] with a primary complaint of Shortness of Breath (THIS AM) and Leg Swelling  .   This is a very pleasant 61-year-old male that presents to emergency room with worsening shortness of breath dyspnea with minimal activity and bilateral lower extremity edema        Past medical history significant for non-small cell lung cancer with metastases to the bone. , COPD, radiation fibrosis of the lung, hypertension and chronic diastolic heart failure.  The patient did have a CVA in August of 2022.      The patient did undergo chemotherapy and radiation therapy at [facility].        He presents to the emergency room today with complaints of shortness of breath and lower extremity edema for the past 2-3 days.  He endorses a dry cough.  He denies fever chills hematemesis chest pain lightheadedness dizziness or syncope.  He describes his symptoms as moderate to severe severity with no exacerbating or alleviating factors.     His last 2D echo showed grade 1 diastolic heart failure with preserved EF of 55%.  The patient states he is on oxygen at home at 3 L a minute per nasal cannula but does not use it always because he is not always short of breath        In the emergency room the patient was found to have some pulmonary edema on his chest x-ray and bilateral lower extremity edema.  He was given 1 dose of IV  Lasix, nebulizer treatments and placed on supplemental O2 with improvement in his oxygen saturations and respiratory status     He will be admitted for further management and observation    9/5/2022  Mr Iverson has has periods of SOB and HUNTLEY. He denies chest pain, nausea or vomiting       9/6: PATIENT SEEN AND EXAMINED. FEELING BETTER, MINIMAL SOB. ON EXAM HAS COMFORTABLE WORK OF BREATHING AND LUNGS ARE CLEAR WITHOUT WHEEZING OR CRACKLES. STABLE FOR DC HOME WITH PREDNISONE, ALBUTEROL, AND TRELEGY. DISCUSSED CONTROLLED MED VS RESCUE MED USAGE. APPRECIATE PULM, OUTPATIENT FU.    Goals of Care Treatment Preferences:  Code Status: Full Code          What is most important right now is to focus on spending time at home, avoiding the hospital, remaining as independent as possible, symptom/pain control, quality of life, even if it means sacrificing a little time.  Accordingly, we have decided that the best plan to meet the patient's goals includes enrolling in hospice care.      Consults:   Consults (From admission, onward)          Status Ordering Provider     Inpatient consult to   Once        Provider:  (Not yet assigned)    Acknowledged OZZY HOWELL     Inpatient consult to Pulmonology  Once        Provider:  Drew West MD    Completed DIANNE GARCÍA     Inpatient consult to Hospitalist  Once        Provider:  Dianne García NP    Acknowledged NEGRITA ELKINS            No new Assessment & Plan notes have been filed under this hospital service since the last note was generated.  Service: Hospital Medicine    Final Active Diagnoses:    Diagnosis Date Noted POA    COPD (chronic obstructive pulmonary disease) [J44.9] 09/05/2022 Yes    Acute respiratory failure with hypoxia and hypercarbia [J96.01, J96.02] 09/05/2022 Yes    Acute on chronic respiratory failure with hypoxia [J96.21] 06/10/2022 Yes    Dependence on supplemental oxygen [Z99.81] 05/24/2022 Not Applicable    Chronic combined  "systolic and diastolic heart failure [I50.42] 02/23/2022 Yes     Chronic    Essential hypertension [I10]  Yes     Chronic    Lung cancer, main bronchus, left [C34.02] 09/10/2019 Yes     Chronic    Former smoker [Z87.891] 08/23/2019 Not Applicable      Problems Resolved During this Admission:    Diagnosis Date Noted Date Resolved POA    PRINCIPAL PROBLEM:  Acute respiratory failure with hypoxia and hypercarbia [J96.01, J96.02] 02/22/2022 09/05/2022 Yes       Discharged Condition: stable    Disposition: Home or Self Care    Follow Up:   Follow-up Information       Mario Hathaway MD Follow up in 1 week(s).    Specialty: Hematology and Oncology  Contact information:  [address] [facility] [address]  [phone]               Drew West MD Follow up in 2 week(s).    Specialty: Pulmonary Disease  Contact information:  [address] [facility] [address]  [id number]  [facility] [place] [address]  [phone]                           Patient Instructions:      NEBULIZER FOR HOME USE     Order Specific Question Answer Comments   Height: 5' 10" (1.778 m)    Weight: 87.6 kg (193 lb 3.7 oz)    Does patient have medical equipment at home? oxygen    Length of need (1-99 months): 99      Diet Cardiac     Notify your health care provider if you experience any of the following:  difficulty breathing or increased cough     Notify your health care provider if you experience any of the following:  persistent nausea and vomiting or diarrhea     Notify your health care provider if you experience any of the following:  temperature >100.4     Activity as tolerated       Pending Diagnostic Studies:       None           Medications:  Reconciled Home Medications:      Medication List        START taking these medications      fluticasone-umeclidin-vilanter 100-62.5-25 mcg Dsdv  Commonly known as: TRELEGY ELLIPTA  Inhale 1 puff into the lungs once daily.            CHANGE how you take these medications      predniSONE 10 MG " tablet  Commonly known as: DELTASONE  Take 3 pills by mouth daily x 5 days, then take 2 pills by mouth daily x 5 days, then take one pill daily x 5 days  What changed:   medication strength  how much to take  how to take this  when to take this  additional instructions            CONTINUE taking these medications      albuterol 90 mcg/actuation inhaler  Commonly known as: VENTOLIN HFA  Inhale 2 puffs into the lungs every 6 (six) hours as needed for Wheezing. Rescue     amLODIPine 10 MG tablet  Commonly known as: NORVASC  Take 1 tablet (10 mg total) by mouth once daily.     aspirin 81 MG Chew  Take 1 tablet (81 mg total) by mouth once daily.     atorvastatin 80 MG tablet  Commonly known as: LIPITOR  Take 1 tablet (80 mg total) by mouth once daily.     carvediloL 6.25 MG tablet  Commonly known as: COREG  Take 1 tablet (6.25 mg total) by mouth 2 (two) times daily.     cilostazoL 100 MG Tab  Commonly known as: PLETAL  Take 1 tablet (100 mg total) by mouth 2 (two) times daily.     clopidogreL 75 mg tablet  Commonly known as: PLAVIX  Take 1 tablet (75 mg total) by mouth once daily.     furosemide 40 MG tablet  Commonly known as: LASIX  Take 1 tablet (40 mg total) by mouth 2 (two) times a day.     losartan 100 MG tablet  Commonly known as: COZAAR  Take 1 tablet (100 mg total) by mouth once daily.     VITAMIN C ENERGY BOOSTER 1,000 mg Pwep  Generic drug: ascorbic acid-multivit-min  Take 3,000 mg by mouth once daily. Patient takes 3,000 mg per day              Indwelling Lines/Drains at time of discharge:   Lines/Drains/Airways       Central Venous Catheter Line  Duration             Port A Cath Single Lumen right subclavian -- days                    Time spent on the discharge of patient: 33 minutes         Sidney Uribe MD  Department of Hospital Medicine  [facility]

## 2022-09-06 NOTE — PLAN OF CARE
Problem: Adult Inpatient Plan of Care  Goal: Plan of Care Review  9/6/2022 1705 by Jennifer Aguilar RN  Outcome: Met  9/6/2022 1048 by Jennifer Aguilar RN  Outcome: Ongoing, Progressing  Goal: Patient-Specific Goal (Individualized)  9/6/2022 1705 by Jennifer Aguilar RN  Outcome: Met  9/6/2022 1048 by Jennifer Aguilar RN  Outcome: Ongoing, Progressing  Goal: Absence of Hospital-Acquired Illness or Injury  9/6/2022 1705 by Jennifer Aguilar RN  Outcome: Met  9/6/2022 1048 by Jennifer Aguilar RN  Outcome: Ongoing, Progressing  Goal: Optimal Comfort and Wellbeing  9/6/2022 1705 by Jennifer Aguilar RN  Outcome: Met  9/6/2022 1048 by Jennifer Aguilar RN  Outcome: Ongoing, Progressing  Goal: Readiness for Transition of Care  9/6/2022 1705 by Jennifer Aguilar RN  Outcome: Met  9/6/2022 1048 by Jennifer Aguilar RN  Outcome: Ongoing, Progressing     Problem: Infection  Goal: Absence of Infection Signs and Symptoms  9/6/2022 1705 by Jennifer Aguilar RN  Outcome: Met  9/6/2022 1048 by Jennifer Aguilar RN  Outcome: Ongoing, Progressing     Problem: Impaired Wound Healing  Goal: Optimal Wound Healing  9/6/2022 1705 by Jennifer Aguilar RN  Outcome: Met  9/6/2022 1048 by Jennifer Aguilar RN  Outcome: Ongoing, Progressing

## 2022-09-06 NOTE — PROGRESS NOTES
"Pulmonary/Critical Care  Progress Note      Patient name: Kashif Iverson  MRN: [id number]  Date: 09/06/2022    Admit Date: 9/4/2022  Consult Requested By: Sae Birmingham MD    Reason for Consult: dyspnea    HPI:    9/5/2022 - 60 yo male with h/o COPD (severity unknown), squamous cell lung cancer (mets to bone, increasing OPAL nodule - sees Dr Hathaway), CHF (5/2022 - EF 65%, ? Diastolic dysfunction), pulmonary hypertension (53), possible radiation pneumonitis/fibrosis, CVA presented to ER with complaint of increased SOB, HUNTLEY, + cough (mostly NP but does have some nasal congestion).  Admitted for further evaluation and treatment.  He has O2 at home but does not use.  His history to me is inconsistent and when I asked why he came to ER he told me he "did not know".    9/6/2022 - Stable overnight and feels better and tells me that he feels good enough to go home.  No new issues reported.    Review of Systems    Review of Systems   Constitutional:  Positive for malaise/fatigue. Negative for chills and fever.   HENT:  Positive for congestion.    Respiratory:  Positive for cough (NP) and shortness of breath.    Cardiovascular:  Negative for chest pain.   Gastrointestinal:  Negative for abdominal pain, diarrhea, nausea and vomiting.   Genitourinary:  Negative for dysuria, frequency and urgency.   Neurological:  Positive for weakness.   Psychiatric/Behavioral:  Negative for depression, substance abuse and suicidal ideas.      Past Medical History    Past Medical History:   Diagnosis Date    Combined systolic and diastolic heart failure     Hypertension     Lung cancer     NSCLC    Lung mass     left lung       Past Surgical History    Past Surgical History:   Procedure Laterality Date    BRONCHOSCOPY N/A 8/30/2019    Procedure: Bronchoscopy;  Surgeon: Dosc Diagnostic Provider;  Location: [facility] OR [address] [place] [address];  Service: Anesthesiology;  Laterality: N/A;    CORONARY ANGIOGRAPHY N/A 3/4/2022    Procedure: ANGIOGRAM, CORONARY ARTERY;  " "Surgeon: Robson Green MD;  Location: [facility] CATH LAB;  Service: Cardiology;  Laterality: N/A;       Medications (scheduled):      albuterol-ipratropium  3 mL Nebulization Q6H    amLODIPine  10 mg Oral Daily    aspirin  81 mg Oral Daily    atorvastatin  80 mg Oral Daily    carvediloL  6.25 mg Oral BID    cilostazoL  100 mg Oral BID    clopidogreL  75 mg Oral Daily    enoxaparin  40 mg Subcutaneous Daily    famotidine (PF)  20 mg Intravenous BID    losartan  100 mg Oral Daily    predniSONE  40 mg Oral Daily       Medications (infusions):           Medications (prn):     melatonin, morphine, sodium chloride 0.9%    Family History:   Family History   Problem Relation Age of Onset    Diabetes Mother     Heart defect Mother     Cancer Father     Cancer Sister     No Known Problems Son        Social History: Tobacco:   Social History     Tobacco Use   Smoking Status Former    Packs/day: 1.00    Years: 25.00    Pack years: 25.00    Types: Cigarettes    Quit date:     Years since quittin.6   Smokeless Tobacco Never                                EtOH:   Social History     Substance and Sexual Activity   Alcohol Use Yes    Comment: ocassionally                                Drugs:   Social History     Substance and Sexual Activity   Drug Use Never       Physical Exam    Vital signs:  Temp:  [97.5 °F (36.4 °C)-98.2 °F (36.8 °C)]   Pulse:  []   Resp:  [15-19]   BP: (119-155)/()   SpO2:  [89 %-96 %]     Intake/Output:   Intake/Output Summary (Last 24 hours) at 2022 0833  Last data filed at 2022 2331  Gross per 24 hour   Intake 480 ml   Output 1125 ml   Net -645 ml          BMI: Estimated body mass index is 27.95 kg/m² as calculated from the following:    Height as of this encounter: 5' 10" (1.778 m).    Weight as of this encounter: 88.3 kg (194 lb 12.4 oz).    Physical Exam  Vitals and nursing note reviewed.   Constitutional:       General: He is not in acute distress.     Appearance: Normal " appearance. He is not ill-appearing, toxic-appearing or diaphoretic.   HENT:      Head: Normocephalic and atraumatic.      Right Ear: External ear normal.      Left Ear: External ear normal.      Nose: Nose normal.      Mouth/Throat:      Mouth: Mucous membranes are moist.      Pharynx: Oropharynx is clear. No oropharyngeal exudate.   Eyes:      General: No scleral icterus.        Right eye: No discharge.         Left eye: No discharge.      Extraocular Movements: Extraocular movements intact.      Conjunctiva/sclera: Conjunctivae normal.      Pupils: Pupils are equal, round, and reactive to light.   Neck:      Vascular: No carotid bruit.   Cardiovascular:      Rate and Rhythm: Normal rate and regular rhythm.      Pulses: Normal pulses.      Heart sounds: Normal heart sounds. No murmur heard.    No friction rub. No gallop.   Pulmonary:      Effort: Pulmonary effort is normal. No respiratory distress.      Breath sounds: No stridor. Rhonchi (some congestion with cough) present. No wheezing or rales.   Chest:      Chest wall: No tenderness.   Abdominal:      General: Bowel sounds are normal. There is no distension.      Tenderness: There is no abdominal tenderness. There is no guarding.   Musculoskeletal:         General: No swelling. Normal range of motion.      Cervical back: Normal range of motion and neck supple. No rigidity or tenderness.      Right lower leg: Edema present.      Left lower leg: Edema present.      Comments: Trace to 1+ edema   Lymphadenopathy:      Cervical: No cervical adenopathy.   Skin:     General: Skin is warm and dry.      Capillary Refill: Capillary refill takes less than 2 seconds.      Coloration: Skin is not jaundiced.      Findings: No bruising.   Neurological:      General: No focal deficit present.      Mental Status: He is alert and oriented to person, place, and time. Mental status is at baseline.      Cranial Nerves: No cranial nerve deficit.      Sensory: No sensory deficit.       Motor: No weakness.   Psychiatric:         Mood and Affect: Mood normal.         Behavior: Behavior normal.         Thought Content: Thought content normal.         Judgment: Judgment normal.       Laboratory    No results for input(s): WBC, RBC, HGB, HCT, PLT, MCV, MCH, MCHC in the last 24 hours.      No results for input(s): GLUCOSE, CALCIUM, PROT, NA, K, CO2, CL, BUN, CREATININE, ALKPHOS, ALT, AST, BILITOT in the last 24 hours.    Invalid input(s):  ALBUMIN      No results for input(s): PT, INR, APTT in the last 24 hours.    No results for input(s): CPK, CPKMB, TROPONINI, MB in the last 24 hours.    Additional labs: reviewed    Microbiology:       Microbiology Results (last 7 days)       Procedure Component Value Units Date/Time    Blood culture [[id number]] Collected: 09/04/22 1008    Order Status: Completed Specimen: Blood from Peripheral, Antecubital, Right Updated: 09/05/22 1032     Blood Culture, Routine No Growth to date      No Growth to date    Blood culture [[id number]] Collected: 09/04/22 1015    Order Status: Completed Specimen: Blood from Peripheral, Antecubital, Left Updated: 09/05/22 1032     Blood Culture, Routine No Growth to date      No Growth to date            Radiology    X-Ray Chest AP Portable  Portable chest x-ray at 10:36 AM is compared to a prior CT dated 8/25/2022    Clinical history shortness of breath, lung cancer    There is a right IJ Port-A-Cath with the tip overlying the atrial caval junction. The heart is normal in size. There is volume loss in the left hemithorax.    There is a stable suprahilar opacity on the left with small left pleural effusion.    The right lung is clear. There are no acute osseous abnormalities.    IMPRESSION: Stable left suprahilar opacity with small left pleural effusion and volume loss in left lung consistent with patient's known malignancy    Right lung remains clear    Electronically signed by:  Joanne Jones MD  9/4/2022 10:47 AM CDT Workstation:  "[id number]        Additional Studies    reviewed    Ventilator Information              Recent Labs     09/04/22  1005   PH 7.402   PCO2 32.7*   PO2 60*   HCO3 20.4*   POCSATURATED 91*   BE -4           Impression    Active Hospital Problems    Diagnosis  POA    COPD (chronic obstructive pulmonary disease) [J44.9]  Yes    Acute respiratory failure with hypoxia and hypercarbia [J96.01, J96.02]  Yes    Acute on chronic respiratory failure with hypoxia [J96.21]  Yes    Dependence on supplemental oxygen [Z99.81]  Not Applicable    Chronic combined systolic and diastolic heart failure [I50.42]  Yes     Chronic    Essential hypertension [I10]  Yes     Chronic    Lung cancer, main bronchus, left [C34.02]  Yes     Chronic     8/30/2019 underwent bronchoscopy that showed "A partially obstructing (about 80% obstructed) mass was found in the middle portion in the left mainstem bronchus. The mass was large and bloody, circumferential, endobronchial, friable and necrotic. 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  No actionable mutations and PD-L1 5%.  9/19/21 staging PET CT showed "Hypermetabolic left lung mass concerning for primary pulmonary malignancy with metastatic disease involving the right 6th and 9th ribs as well as mediastinal and left supraclavicular lymph nodes."  Stage IV squamous cell carcinoma of the lung at diagnosis.    10/8/2019-10/21/2019 he received palliative chemoradiation for rib pain with carboplatin and paclitaxel.  He received 3 cycles of carboplatin and paclitaxel.  He developed anaphylactic reaction to paclitaxel.  The chest was treated with AP:PA fields and the right 9th rib was treated with anterior and posterior oblique fields at 300 cGy per fraction to 3000 cGy.   Lt chest 6X 300 10 / 10 3,000   Rt 9th rib 6X 300 10 / 10 3,000     10/31/2019-9/10/2020 he received pembrolizumab (completed 15 cycles, last dose " 8/21/2020)  9/10/2020 PET CT showed progression of disease.  He was then referred to Dr. Key for evaluation for clinical trials.  10/20/2020 he underwent repeat biopsy for LUNG MAP trial and was randomized to Ramucirumab + pembrolizumab.    11/17/2020 started ramucirumab+pembrolizumab.  Completed 4 cycles and then 2/10/2021 scans showed progression.    2/24/2021 he was subsequent started on gemcitabine with Dr. Cruz.      Former smoker [Z87.891]  Not Applicable      Resolved Hospital Problems    Diagnosis Date Resolved POA    *Acute respiratory failure with hypoxia and hypercarbia [J96.01, J96.02] 09/05/2022 Yes       Plan    Continue respiratory treatments and oral steroids  Not clear to me if there is an acute issue here  He has O2 at home and we need to assess O2 needs so we can help guide him  Needs to follow with his oncologist and radiation oncologist  Last ECHO noted diastolic function not defines and has normal systolic function  OK to DC home on    ICS/LABA/LAMA (trelegy or breztri)  Prn CONNIE  Prednisone taper - 30 x 5, 20 x 5, 1 x 5  Check O2 needs and guide pt on use (I suspect that he needs to use it more)  He needs pulmonary followup as outpt      Thank you for this consult.  I will follow with you while the patient is hospitalized.        Drew West MD  [facility] Pulmonary/Critical Care  09/06/2022

## 2022-09-06 NOTE — CARE UPDATE
09/06/22 0900   Home Oxygen Qualification   $ Home O2 Qualification Pulmonary Stress Test/6 min walk;Tech time 15 minutes   Room Air SpO2 At Rest 94 %   Room Air SpO2 During Ambulation (!) 87 %   SpO2 During Ambulation on O2 94 %   Heart Rate on O2 110 bpm   Ambulation O2 LPM 2 LPM   SpO2 Post Ambulation (!) 89 %   Post Ambulation Heart Rate 127 bpm   Post Ambulation O2 LPM 2 LPM   Home O2 Eval Comments PT NEEDS OXYGEN WITH AMBULATION. 2-3 LITERS REQUIRED. PT BECAME VERY SOB AND BECAME TACYCHARDIC. STATES HE HAS BOTTLES OF OXYGEN AT HOME.

## 2022-09-06 NOTE — CARE UPDATE
09/06/22 0821   Patient Assessment/Suction   Level of Consciousness (AVPU) alert   Respiratory Effort Unlabored;Normal   Expansion/Accessory Muscles/Retractions expansion symmetric   All Lung Fields Breath Sounds equal bilaterally   OPAL Breath Sounds clear   LLL Breath Sounds clear   RUL Breath Sounds clear   RML Breath Sounds diminished   RLL Breath Sounds crackles, fine   Rhythm/Pattern, Respiratory pattern regular;unlabored   Cough Frequency infrequent   Cough Type no productive sputum   PRE-TX-O2   SpO2 (!) 94 %   Pulse 74   Resp 16   Aerosol Therapy   $ Aerosol Therapy Charges Aerosol Treatment   Daily Review of Necessity (SVN) completed   Respiratory Treatment Status (SVN) given   Treatment Route (SVN) mask;oxygen   Patient Position (SVN) Quintero's   Post Treatment Assessment (SVN) breath sounds unchanged   Signs of Intolerance (SVN) none   Respiratory Evaluation   $ Care Plan Tech Time 15 min   $ Eval/Re-eval Charges Re-evaluation

## 2022-09-07 NOTE — PLAN OF CARE
09/07/22 1233   Final Note   Assessment Type Final Discharge Note   Anticipated Discharge Disposition Home   What phone number can be called within the next 1-3 days to see how you are doing after discharge? [phone]   Hospital Resources/Appts/Education Provided Provided patient/caregiver with written discharge plan information;Patient refused appointment set-up   Post-Acute Status   Coverage Medicare A&B   Discharge Delays None known at this time       Orders reviewed. Pt already discharged. Call Patient about nebulizer order and spouse verbalized already have spoken with [facility] about getting one. Spouse verbalized going to call for follow up appointments.  gave spouse phone number to call if she needs any help. No further needs from case management.

## 2022-09-07 NOTE — PLAN OF CARE
09/06/22 2150   Post-Acute Status   Post-Acute Authorization HME   HME Status Set-up Complete/Auth obtained     SW consulted after hours due to wife presenting to pick Pt up.  She reportedly brought tank from home that was empty.  Per records, Pt has Home O2 through [facility] that was set up in May.  Contacted on-call Greg that provided auth to pull one tank from [facility].  Provided this information to Nursing Supervisor Shan, who is to deliver to Pt's room.  Paperwork to be followed up on in the morning for filing.

## 2022-09-07 NOTE — RESPIRATORY THERAPY
09/06/22 1945   Patient Assessment/Suction   Level of Consciousness (AVPU) alert   Respiratory Effort Unlabored   Expansion/Accessory Muscles/Retractions expansion symmetric   All Lung Fields Breath Sounds clear;diminished   PRE-TX-O2   O2 Device (Oxygen Therapy) nasal cannula   $ Is the patient on Low Flow Oxygen? Yes   Flow (L/min) 2   Pulse Oximetry Type Continuous   $ Pulse Oximetry - Multiple Charge Pulse Oximetry - Multiple   Pulse 105   Resp 17   Education   $ Education Bronchodilator;DME Nebulizer;DME Oxygen;15 min   Respiratory Evaluation   $ Care Plan Tech Time 15 min   $ Eval/Re-eval Charges Re-evaluation

## 2022-09-07 NOTE — NURSING
"September 6, 2022 2102: Notified SCOTT Berumen MD/hospitalist that patient has home O2 orders for discharge, but wife brought empty O2 tank. [place] KELLY stated " notify house supervisor and charge to see if something can be done if not the patient has to stay."     2104 Notified charge nurse Alex Obando of situation and he stated he would call the house supervisor.     2131:Talked to  Lesa Peralta about  O2 tank and possibility of tank being switch.    2204 Tank and regulator switched out( See  note) by House supervisor and charge nurse.     2211 Patient cleared for discharge.  "

## 2022-09-23 NOTE — Clinical Note
RTC next Tuesday for labs (CMP, CBC, Mg) and gem tx with possible K or Mg infusion -Please reset up Q2 week gem and MD visits with labs as Dr. Hathaway had it arranged prior to radiation break

## 2022-09-23 NOTE — PROGRESS NOTES
"  PATIENT: Kashif Iverson  MRN: [id number]  DATE: 9/23/2022      Diagnosis:   1. Lung cancer, main bronchus, left    2. Immunodeficiency due to drugs    3. Chronic respiratory failure with hypoxia    4. Benign essential HTN    5. Encounter for antineoplastic chemotherapy          Chief Complaint: Lung Cancer, Chemotherapy, Follow-up, and Hypokalemia      Oncologic History:    Oncologic History 1. 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 Palliative chemoradiation with carboplatin and paclitaxel; anaphylactic reaction to paclitaxel  2. Pembrolizumab  3. Pembrolizumab+ramucirumab (LungMAP trial)  4. Gemcitabine    Pathology Squamous cell carcinoma, no actionable mutations, PD-L1 5%        Subjective:    Interval History: Mr. Iverson returns for follow up.     8/30/2019 underwent bronchoscopy that showed "A partially obstructing (about 80% obstructed) mass was found in the middle portion in the left mainstem bronchus. The mass was large and bloody, circumferential, endobronchial, friable and necrotic. 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  No actionable mutations and PD-L1 5%.  9/19/21 staging PET CT showed "Hypermetabolic left lung mass concerning for primary pulmonary malignancy with metastatic disease involving the right 6th and 9th ribs as well as mediastinal and left supraclavicular lymph nodes."  Stage IV squamous cell carcinoma of the lung at diagnosis.    10/8/2019-10/21/2019 he received palliative chemoradiation for rib pain with carboplatin and paclitaxel.  He received 3 cycles of carboplatin and paclitaxel.  He developed anaphylactic reaction to paclitaxel.  The chest was treated with AP:PA fields and the right 9th rib was treated with anterior and posterior oblique fields at 300 cGy per fraction to 3000 cGy.   Lt chest 6X 300 10 / 10 3,000   Rt 9th rib 6X 300 10 / 10 3,000 "     10/31/2019-9/10/2020 he received pembrolizumab (completed 15 cycles, last dose 8/21/2020)  9/10/2020 PET CT showed progression of disease.  He was then referred to Dr. Key for evaluation for clinical trials.  10/20/2020 he underwent repeat biopsy for LUNG MAP trial and was randomized to Ramucirumab + pembrolizumab.    11/17/2020 started ramucirumab+pembrolizumab.  Completed 4 cycles and then 2/10/2021 scans showed progression.    2/24/2021 he was subsequent started on gemcitabine with Dr. Cruz.  His care was transferred to Dr. Romo who continued his current regimen and now is currently under my care.   5/17/22-5/22/22 admitted for acute respiratory failure secondary to heart failure exacerbation requiring intubation.  He was extubated and diuresed successfully.  8/15/22-8/20/22 admitted for acute hypoxic respiratory failure secondary to heart failure; diuresed and discharged with supplemental oxygen  -      Interval hx:      Pt feels well and denied any issues at time of visit.  K supplements given.      Wife accompanies him at this visit.    Past Medical History:   Past Medical History:   Diagnosis Date    Combined systolic and diastolic heart failure     Hypertension     Lung cancer     NSCLC    Lung mass     left lung       Past Surgical HIstory:   Past Surgical History:   Procedure Laterality Date    BRONCHOSCOPY N/A 8/30/2019    Procedure: Bronchoscopy;  Surgeon: Miguel Diagnostic Provider;  Location: [address];  Service: Anesthesiology;  Laterality: N/A;    CORONARY ANGIOGRAPHY N/A 3/4/2022    Procedure: ANGIOGRAM, CORONARY ARTERY;  Surgeon: Robson Green MD;  Location: [facility] CATH LAB;  Service: Cardiology;  Laterality: N/A;       Family History:   Family History   Problem Relation Age of Onset    Diabetes Mother     Heart defect Mother     Cancer Father     Cancer Sister     No Known Problems Son        Social History:  reports that he quit smoking about 2 years ago. His smoking use included  cigarettes. He has a 25.00 pack-year smoking history. He has never used smokeless tobacco. He reports current alcohol use. He reports that he does not use drugs.    Allergies:  Review of patient's allergies indicates:  No Known Allergies    Medications:  Current Outpatient Medications   Medication Sig Dispense Refill    albuterol (VENTOLIN HFA) 90 mcg/actuation inhaler Inhale 2 puffs into the lungs every 6 (six) hours as needed for Wheezing. Rescue 18 g 11    amLODIPine (NORVASC) 10 MG tablet Take 1 tablet (10 mg total) by mouth once daily. 30 tablet 11    ascorbic acid-multivit-min (VITAMIN C ENERGY BOOSTER) 1,000 mg PwEP Take 3,000 mg by mouth once daily. Patient takes 3,000 mg per day      aspirin 81 MG Chew Take 1 tablet (81 mg total) by mouth once daily. 30 tablet 11    atorvastatin (LIPITOR) 80 MG tablet Take 1 tablet (80 mg total) by mouth once daily. 90 tablet 3    carvediloL (COREG) 6.25 MG tablet Take 1 tablet (6.25 mg total) by mouth 2 (two) times daily. 60 tablet 11    cilostazoL (PLETAL) 100 MG Tab Take 1 tablet (100 mg total) by mouth 2 (two) times daily. 60 tablet 11    clopidogreL (PLAVIX) 75 mg tablet Take 1 tablet (75 mg total) by mouth once daily. 30 tablet 11    fluticasone-umeclidin-vilanter (TRELEGY ELLIPTA) 100-62.5-25 mcg DsDv Inhale 1 puff into the lungs once daily. 1 each 1    losartan (COZAAR) 100 MG tablet Take 1 tablet (100 mg total) by mouth once daily. 90 tablet 3    predniSONE (DELTASONE) 10 MG tablet Take 3 pills by mouth daily x 5 days, then take 2 pills by mouth daily x 5 days, then take one pill daily x 5 days 30 tablet 0    furosemide (LASIX) 40 MG tablet Take 1 tablet (40 mg total) by mouth 2 (two) times a day. 60 tablet 2    potassium chloride SA (K-DUR,KLOR-CON) 20 MEQ tablet Take 2 tablets (40 mEq total) by mouth once daily. for 5 days 10 tablet 0     No current facility-administered medications for this visit.       Review of Systems   Constitutional:  Negative for activity  "change, appetite change, chills, diaphoresis, fatigue, fever and unexpected weight change.   HENT:  Negative for nosebleeds and trouble swallowing.    Eyes:  Negative for visual disturbance.   Respiratory:  Negative for cough, chest tightness, shortness of breath and wheezing.    Cardiovascular:  Negative for chest pain and leg swelling.   Gastrointestinal:  Negative for abdominal distention, abdominal pain, blood in stool, constipation, diarrhea, nausea and vomiting.   Endocrine: Negative for cold intolerance and heat intolerance.   Genitourinary:  Negative for difficulty urinating and dysuria.   Musculoskeletal:  Negative for arthralgias, back pain and myalgias.   Skin:  Negative for color change.   Neurological:  Negative for dizziness, weakness, light-headedness, numbness and headaches.   Hematological:  Negative for adenopathy. Bruises/bleeds easily.   Psychiatric/Behavioral:  Negative for confusion.      ECOG Performance Status:     ECOG SCORE    1 - Restricted in strenuous activity-ambulatory and able to carry out work of a light nature         Objective:      Vitals:   Vitals:    09/23/22 0921   BP: (!) 175/90   BP Location: Left arm   Patient Position: Sitting   BP Method: Large (Automatic)   Pulse: 89   SpO2: (!) 91%   Weight: 90.7 kg (199 lb 15.3 oz)   Height: 5' 9.5" (1.765 m)       BMI: Body mass index is 29.11 kg/m².    Physical Exam  Constitutional:       General: He is not in acute distress.     Appearance: Normal appearance. He is not ill-appearing.   HENT:      Head: Normocephalic and atraumatic.      Nose: Nose normal.      Mouth/Throat:      Pharynx: No oropharyngeal exudate or posterior oropharyngeal erythema.   Eyes:      General: No scleral icterus.     Extraocular Movements: Extraocular movements intact.      Conjunctiva/sclera: Conjunctivae normal.      Pupils: Pupils are equal, round, and reactive to light.   Cardiovascular:      Rate and Rhythm: Normal rate and regular rhythm.      Heart " sounds: No murmur heard.    No friction rub. No gallop.      Comments: Right chest wall port c/d/i  Pulmonary:      Effort: Pulmonary effort is normal. No respiratory distress.      Breath sounds: No stridor. No wheezing, rhonchi or rales.   Abdominal:      General: Bowel sounds are normal. There is no distension.      Palpations: Abdomen is soft. There is no mass.      Tenderness: There is no abdominal tenderness. There is no guarding or rebound.   Musculoskeletal:         General: No swelling or tenderness. Normal range of motion.      Cervical back: Normal range of motion and neck supple.      Right lower leg: No edema.      Left lower leg: No edema.   Skin:     General: Skin is warm and dry.      Findings: No bruising.   Neurological:      General: No focal deficit present.      Mental Status: He is alert.       Laboratory Data:   Recent Results (from the past 168 hour(s))   CBC Auto Differential    Collection Time: 09/23/22  9:04 AM   Result Value Ref Range    WBC 8.47 3.90 - 12.70 K/uL    RBC 4.24 (L) 4.60 - 6.20 M/uL    Hemoglobin 13.1 (L) 14.0 - 18.0 g/dL    Hematocrit 39.3 (L) 40.0 - 54.0 %    MCV 93 82 - 98 fL    MCH 30.9 27.0 - 31.0 pg    MCHC 33.3 32.0 - 36.0 g/dL    RDW 15.3 (H) 11.5 - 14.5 %    Platelets 209 150 - 450 K/uL    MPV 10.3 9.2 - 12.9 fL    Immature Granulocytes 0.4 0.0 - 0.5 %    Gran # (ANC) 5.8 1.8 - 7.7 K/uL    Immature Grans (Abs) 0.03 0.00 - 0.04 K/uL    Lymph # 1.4 1.0 - 4.8 K/uL    Mono # 0.9 0.3 - 1.0 K/uL    Eos # 0.2 0.0 - 0.5 K/uL    Baso # 0.06 0.00 - 0.20 K/uL    nRBC 0 0 /100 WBC    Gran % 68.6 38.0 - 73.0 %    Lymph % 16.8 (L) 18.0 - 48.0 %    Mono % 11.1 4.0 - 15.0 %    Eosinophil % 2.4 0.0 - 8.0 %    Basophil % 0.7 0.0 - 1.9 %    Differential Method Automated    Comprehensive Metabolic Panel    Collection Time: 09/23/22  9:04 AM   Result Value Ref Range    Sodium 142 136 - 145 mmol/L    Potassium 3.0 (L) 3.5 - 5.1 mmol/L    Chloride 105 95 - 110 mmol/L    CO2 28 23 - 29  mmol/L    Glucose 116 (H) 70 - 110 mg/dL    BUN 12 8 - 23 mg/dL    Creatinine 0.8 0.5 - 1.4 mg/dL    Calcium 9.6 8.7 - 10.5 mg/dL    Total Protein 6.7 6.0 - 8.4 g/dL    Albumin 3.9 3.5 - 5.2 g/dL    Total Bilirubin 0.6 0.1 - 1.0 mg/dL    Alkaline Phosphatase 92 55 - 135 U/L    AST 13 10 - 40 U/L    ALT 13 10 - 44 U/L    Anion Gap 9 8 - 16 mmol/L    eGFR >60 >60 mL/min/1.73 m^2           Imaging:       CT Chest Abdomen Pelvis With Contrast (xpd)    Result Date: 8/25/2022  EXAMINATION: CT CHEST ABDOMEN PELVIS WITH CONTRAST (XPD) CLINICAL HISTORY: metastatic lung cancer on gemcitabine, eval response; Malignant neoplasm of left main bronchus TECHNIQUE: Low dose axial images, sagittal and coronal reformations were obtained from the thoracic inlet to the pubic symphysis following the IV administration of 85 mL of Omnipaque 350 and the oral administration of 450 ml of Readi-Cat barium suspension. COMPARISON: 05/26/2022. FINDINGS: There is a right internal jugular Port-A-Cath present with tip located within the right atrium.  There is a left supraclavicular soft tissue density nodule measuring approximately 3.0 x 1.5 cm (image 16, series 2), not significantly changed.  The heart is not enlarged.  There is a moderate amount of atherosclerotic calcification identified within the coronary arteries in a multi-vessel distribution.  Atherosclerotic calcification is also present within the thoracic aorta which is normal in caliber without evidence for aortic dissection or aneurysmal dilatation.  No hilar or mediastinal adenopathy is identified.  No axillary adenopathy is identified.  Once again, there is occlusion of the left upper lobe bronchus with band of soft tissue density within the left upper lobe extending from the hilum much of which represents atelectasis and parenchymal scarring.  There is also a more nodular opacity within the left upper lobe medially measuring 2.1 x 1.5 cm (image 154, series 6) compared to 1.7 x 1.1  cm on the prior examination.  This is worrisome for enlarging malignancy.  There is also a wedge-shaped opacity within the superior left lower lobe which is unchanged and likely represents an area of atelectasis/scarring.  There is a stable 5 mm pulmonary nodule within the left lower lobe laterally (image 312, series 6).  No new nodules are identified.  There are moderate centrilobular emphysematous changes.  Bony structures appear intact.  There is no evidence for acute fracture or bone destruction. The liver is normal in size and is homogeneous in density with no focal liver lesions identified.  The gallbladder is contracted.  There is no evidence for intrahepatic or extrahepatic biliary dilatation.  The portal venous system is patent.  The spleen and stomach appear unremarkable.  There are a few pancreatic calcifications present which may be related to chronic pancreatitis.  No focal pancreatic lesions are identified.  The adrenal glands are not enlarged.  The kidneys are normal in size and there is no evidence for abnormal renal masses or hydronephrosis.  The kidneys are noted to concentrate contrast symmetrically.  Atherosclerotic calcification is present within the abdominal aorta which demonstrates ectasia without aneurysmal dilatation.  No para-aortic lymphadenopathy is identified.  The appendix is present and appears unremarkable.  No dilated loops of bowel are evident.  The urinary bladder appears grossly unremarkable.  There are multiple prostatic calcifications present. There is no evidence for pelvic or inguinal lymphadenopathy.     Enlarging nodule within the left upper lobe medially now measuring 2.1 x 1.5 cm compared to 1.7 x 1.1 cm on prior examination dated 05/26/2022.  This is worrisome for malignancy. Left supraclavicular soft tissue density nodule measuring 3.0 x 1.5 cm, not significantly changed..  This is an area of prior biopsy proven malignancy. Occlusion of the left upper lobe bronchus with  "probable atelectasis/scarring within the left upper lobe extending from the hilum, not significantly changed.  There is also a wedge-shaped opacity within the superior left lower lobe which is also unchanged and likely represents an area of atelectasis/scarring. 5mm pulmonary nodule within the left lower lobe laterally, stable dating back to 11/06/2020. Moderate centrilobular emphysema. Electronically signed by: Rayshawn Degroot MD Date:    08/25/2022 Time:    13:21    Assessment/Plan:           Lung cancer, main bronchus, left - Primary (Chronic)  8/30/2019 underwent bronchoscopy that showed "A partially obstructing (about 80% obstructed) mass was found in the middle portion in the left mainstem bronchus. The mass was large and bloody, circumferential, endobronchial, friable and necrotic. 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  No actionable mutations and PD-L1 5%.  9/19/21 staging PET CT showed "Hypermetabolic left lung mass concerning for primary pulmonary malignancy with metastatic disease involving the right 6th and 9th ribs as well as mediastinal and left supraclavicular lymph nodes."  Stage IV squamous cell carcinoma of the lung at diagnosis.     10/8/2019-10/21/2019 he received palliative chemoradiation for rib pain with carboplatin and paclitaxel.  He received 3 cycles of carboplatin and paclitaxel.  He developed anaphylactic reaction to paclitaxel.  The chest was treated with AP:PA fields and the right 9th rib was treated with anterior and posterior oblique fields at 300 cGy per fraction to 3000 cGy.   Lt chest 6X 300 10 / 10 3,000   Rt 9th rib 6X 300 10 / 10 3,000      10/31/2019-9/10/2020 he received pembrolizumab (completed 15 cycles, last dose 8/21/2020)  9/10/2020 PET CT showed progression of disease.  He was then referred to Dr. Key for evaluation for clinical trials.  10/20/2020 he underwent repeat biopsy for LUNG MAP " "trial and was randomized to Ramucirumab + pembrolizumab.     11/17/2020 started ramucirumab+pembrolizumab.  Completed 4 cycles and then 2/10/2021 scans showed progression.     2/24/2021 he was subsequent started on gemcitabine with Dr. Cruz.    -Referred to rad/onc to have tx of OPAL lesion        A/P 9/23/22 8/25/22 CT scan reviewed and compared to scans going back to 1/2022. Very slow interval growth of left upper lobe nodular opacity. Otherwise he has grossly stable disease.  He also has had treatment interruptions due to recurrent heart failure exacerbation.  -Repeat CT in late Nov  -labs reviewed; ok to proceed with maintenance gemcitabine and replenish K and Mg as needed  -labs and follow up with Dr. Higgins          Chronic combined systolic and diastolic heart failure (Chronic)            Problem List Items Addressed This Visit          Pulmonary    Chronic respiratory failure with hypoxia       Immunology/Multi System    Immunodeficiency due to drugs (Chronic)       Oncology    Lung cancer, main bronchus, left - Primary (Chronic)    Overview     8/30/2019 underwent bronchoscopy that showed "A partially obstructing (about 80% obstructed) mass was found in the middle portion in the left mainstem bronchus. The mass was large and bloody, circumferential, endobronchial, friable and necrotic. 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  No actionable mutations and PD-L1 5%.  9/19/21 staging PET CT showed "Hypermetabolic left lung mass concerning for primary pulmonary malignancy with metastatic disease involving the right 6th and 9th ribs as well as mediastinal and left supraclavicular lymph nodes."  Stage IV squamous cell carcinoma of the lung at diagnosis.    10/8/2019-10/21/2019 he received palliative chemoradiation for rib pain with carboplatin and paclitaxel.  He received 3 cycles of carboplatin and paclitaxel.  He developed " anaphylactic reaction to paclitaxel.  The chest was treated with AP:PA fields and the right 9th rib was treated with anterior and posterior oblique fields at 300 cGy per fraction to 3000 cGy.   Lt chest 6X 300 10 / 10 3,000   Rt 9th rib 6X 300 10 / 10 3,000     10/31/2019-9/10/2020 he received pembrolizumab (completed 15 cycles, last dose 8/21/2020)  9/10/2020 PET CT showed progression of disease.  He was then referred to Dr. Key for evaluation for clinical trials.  10/20/2020 he underwent repeat biopsy for LUNG MAP trial and was randomized to Ramucirumab + pembrolizumab.    11/17/2020 started ramucirumab+pembrolizumab.  Completed 4 cycles and then 2/10/2021 scans showed progression.    2/24/2021 he was subsequent started on gemcitabine with Dr. Cruz.         Relevant Medications    potassium chloride SA (K-DUR,KLOR-CON) 20 MEQ tablet    Other Relevant Orders    Magnesium    CBC w/ DIFF    COMPREHENSIVE METABOLIC PANEL    Magnesium     Other Visit Diagnoses       Benign essential HTN        Encounter for antineoplastic chemotherapy                Orders Placed This Encounter   Procedures    Magnesium    CBC w/ DIFF    COMPREHENSIVE METABOLIC PANEL    Magnesium             Guerline Higgins MD  Hematology Oncology

## 2022-09-27 NOTE — PLAN OF CARE
Pt received D1 of Gemzar and 20 mEq KCL. Most recent labs reviewed along with plan of care. Pt seen in clinic by  recently. VSS. Pt tolerated infusions well. Received discharge instructions along with next appointments. Instructed to return for chemo and repeat labs on October 11th. Discharged from unit in [facility].

## 2022-09-28 PROBLEM — L03.116 LEFT LEG CELLULITIS: Status: ACTIVE | Noted: 2022-01-01

## 2022-09-28 NOTE — TELEPHONE ENCOUNTER
"TC  from pt's spouse stating that his O2 sat lasp pm was 88 % on 3 liters of oxygen    At that time he was having some SOB and just not feeling well. She increased O2 a  " little"  Later in evening he stated he felt better She checked o@ sat again and it was 88 % but he was resting comfortable  She sat up all night with him   early this am it dropped to 77 & on 3 liters  It is now 88 % but she is concerned  He is having some SOB     She denies fever no n/v/d  Advised her to present to ER now  Spouse stated she would take him immediately to  ER.  Nurse instructed her that if his O 2 level dropped into the 70's or below 88 % on oxygen she needed to present ER immediately or contact 911 if in distress She acknowledged understanding   "

## 2022-09-28 NOTE — FIRST PROVIDER EVALUATION
"Medical screening examination initiated.  I have conducted a focused provider triage encounter, findings are as follows:    Brief history of present illness:  Hx of Lung Ca, currently on Chemo. Home pulse ox showed sat of 74%. Called his doctor and was instructed to come to ER for evaluation. Pt denies currently feeling SOB. Reports and episode of SOB yesterday that resolved spontaneously    Vitals:    09/28/22 1505   BP: (!) 149/80   BP Location: Left arm   Patient Position: Sitting   Pulse: 105   Resp: (!) 24   Temp: 99 °F (37.2 °C)   TempSrc: Oral   SpO2: 95%   Weight: 90.3 kg (199 lb)   Height: 5' 10" (1.778 m)       Pertinent physical exam:  No increased work of breathing, no accessory muscle use, speaking in full sentences, oxygen saturation 95% on patient's usual 2 L nasal cannula    Pt to be placed in exam room for further evaluation  "

## 2022-09-28 NOTE — ED NOTES
Pt continues to need increase in oxygen need. MD aware and to bedside, states to start pt on high flow NC.

## 2022-09-28 NOTE — ED PROVIDER NOTES
Encounter Date: 9/28/2022    SCRIBE #1 NOTE: I, Leander Kingsley, am scribing for, and in the presence of,  Teresa Swanson DO.     History     Chief Complaint   Patient presents with    Shortness of Breath     Wife presents with patient, states the patient's pulse ox read 74% earlier today and his provider sent them here. Pt is stage 4 lung cancer on active chemo, 2 L NC at all times. Pt reports he had an episode of SOB yesterday.      Kashif Iverson is a 61 y.o. male with a PMHx of stage 4 lung cancer (currently on Chemo), CHF, HTN who presents to the Emergency Department with the chief complaint of a flare up of chronic shortness of breath that began last night and has since resolved. Patient is accompanied by his wife who reports that last night the patient became very anxious and short of breath. She states the patient has had numerous similar episodes in the past attributed to his lung cancer and anxiety. She explains that she checked the patient's SpO2 throughout the night with readings at 88%-89%. Patient's wife reports that today around 1200 the patient's SpO2 dropped to 74% while he was on his oxygen concentrator. She states that she called the patient's PCP and was instructed to bring the patient into the ED for further evaluation. Patient is also complaining of a 2 day history of left leg swelling and a productive cough. He denies any fevers, chills, vomiting, chest pain, abdominal pain, or shortness of breath at this time. Patient's wife explains the patient has had a similar episode of unilateral left leg swelling for which the patient was admitted and treated with lasix. She states the patient is no longer on lasix. She explains that the patient was previously on hospice, but is no longer on hospice as his condition seemed to improve. She notes that the patient is not on any medications for anxiety.    The history is provided by the patient and the spouse.   Review of patient's allergies indicates:  No Known  Allergies  Past Medical History:   Diagnosis Date    Combined systolic and diastolic heart failure     Hypertension     Lung cancer     NSCLC    Lung mass     left lung     Past Surgical History:   Procedure Laterality Date    BRONCHOSCOPY N/A 2019    Procedure: Bronchoscopy;  Surgeon: Miguel Diagnostic Provider;  Location: [facility] OR [address] [place] [address];  Service: Anesthesiology;  Laterality: N/A;    CORONARY ANGIOGRAPHY N/A 3/4/2022    Procedure: ANGIOGRAM, CORONARY ARTERY;  Surgeon: Robson Green MD;  Location: [facility] CATH LAB;  Service: Cardiology;  Laterality: N/A;     Family History   Problem Relation Age of Onset    Diabetes Mother     Heart defect Mother     Cancer Father     Cancer Sister     No Known Problems Son      Social History     Tobacco Use    Smoking status: Former     Packs/day: 1.00     Years: 25.00     Pack years: 25.00     Types: Cigarettes     Quit date:      Years since quittin.7    Smokeless tobacco: Never   Substance Use Topics    Alcohol use: Yes     Comment: ocassionally    Drug use: Never     Review of Systems   Constitutional:  Negative for chills and fever.   Respiratory:  Positive for cough and shortness of breath (resolved).    Cardiovascular:  Positive for leg swelling. Negative for chest pain.   Gastrointestinal:  Negative for abdominal pain, nausea and vomiting.   Psychiatric/Behavioral:  The patient is nervous/anxious (resolved).    All other systems reviewed and are negative.    Physical Exam     Initial Vitals [22 1505]   BP Pulse Resp Temp SpO2   (!) 149/80 105 (!) 24 99 °F (37.2 °C) 95 %      MAP       --         Physical Exam    Nursing note and vitals reviewed.  Constitutional: He appears well-developed and well-nourished.   HENT:   Head: Normocephalic and atraumatic.   Right Ear: External ear normal.   Left Ear: External ear normal.   Nose: Nose normal.   Mouth/Throat: Oropharynx is clear and moist.   Eyes: Conjunctivae are normal.   Neck: Neck supple.   Normal range  of motion.  Cardiovascular:  Normal rate, regular rhythm, normal heart sounds and intact distal pulses.     Exam reveals no gallop and no friction rub.       No murmur heard.  Pulmonary/Chest: Effort normal. No respiratory distress. He has decreased breath sounds (bilaterally). He has no wheezes. He has no rhonchi. He has no rales.   Abdominal: Abdomen is soft. Bowel sounds are normal. There is no abdominal tenderness. There is no guarding.   Musculoskeletal:         General: Normal range of motion.      Cervical back: Normal range of motion and neck supple.      Right lower leg: Edema (trace) present.      Left lower le+ Pitting Edema present.     Neurological: He is alert and oriented to person, place, and time.   Skin: Skin is warm and dry.   Psychiatric: He has a normal mood and affect. His behavior is normal.       ED Course   Critical Care    Date/Time: 2022 7:28 PM  Performed by: Teresa Swanson DO  Authorized by: Teresa Swanson DO   Direct patient critical care time: 8 minutes  Additional history critical care time: 8 minutes  Ordering / reviewing critical care time: 8 minutes  Documentation critical care time: 8 minutes  Consulting other physicians critical care time: 8 minutes  Total critical care time (exclusive of procedural time) : 40 minutes  Critical care was necessary to treat or prevent imminent or life-threatening deterioration of the following conditions: respiratory failure.  Critical care was time spent personally by me on the following activities: evaluation of patient's response to treatment, examination of patient, obtaining history from patient or surrogate, ordering and performing treatments and interventions, ordering and review of laboratory studies, ordering and review of radiographic studies, pulse oximetry, re-evaluation of patient's condition and review of old charts.      Labs Reviewed   CBC W/ AUTO DIFFERENTIAL - Abnormal; Notable for the following components:       Result Value     RBC 4.04 (*)     Hemoglobin 12.5 (*)     Hematocrit 37.7 (*)     RDW 15.0 (*)     Gran # (ANC) 9.9 (*)     Lymph # 0.6 (*)     Gran % 88.8 (*)     Lymph % 5.0 (*)     All other components within normal limits   COMPREHENSIVE METABOLIC PANEL - Abnormal; Notable for the following components:    Glucose 170 (*)     Total Bilirubin 1.1 (*)     All other components within normal limits   B-TYPE NATRIURETIC PEPTIDE - Abnormal; Notable for the following components:     (*)     All other components within normal limits   CULTURE, BLOOD   CULTURE, BLOOD   TROPONIN I   LACTIC ACID, PLASMA   PROCALCITONIN     EKG Readings: (Independently Interpreted)   No STEMI. Normal sinus rhythm. Ventricular rate of 94. QTc 440. Abnormal EKG. When compared to prior EKG done on 9/4/22, rate has decreased by 28 beats per minute today.     Imaging Results              CTA Chest Non-Coronary (PE Studies) (Final result)  Result time 09/28/22 19:01:46      Final result by Luis Enrique Schroeder MD (09/28/22 19:01:46)                   Impression:      1. No evidence of acute pulmonary thromboembolism.  2. Decrease in right pleural fluid collection.  3. Stable consolidated changes in the right and left lungs as described.      Electronically signed by: Luis Enrique Schroeder  Date:    09/28/2022  Time:    19:01               Narrative:    EXAMINATION:  CTA CHEST NON CORONARY (PE STUDIES)    CLINICAL HISTORY:  Pulmonary embolism (PE) suspected, high prob;    TECHNIQUE:  Following the intravenous administration of 75 cc of Omnipaque 350 contrast material, 1.25 mm contiguous axial images were acquired through the chest utilizing the CT pulmonary angiogram protocol.  2-D coronal and sagittal reconstructed images of the chest and sagittal oblique MIP images of the pulmonary arterial system were generated from the source data.    COMPARISON:  06/10/2022.    FINDINGS:  Pulmonary arterial system: This is a good quality examination for the evaluation of  pulmonary thromboembolism with good opacification of the pulmonary arteries to the level of the segmental branches of the pulmonary arterial system. There is no evidence of acute pulmonary thromboembolism. No large saddle pulmonary embolism, enlargement of the main pulmonary artery, or secondary findings of right ventricular strain.    Aorta and heart: The heart is normal in size.  No pericardial fluid.  No thoracic aortic aneurysm.  No thoracic aortic dissection.    Airways: Unremarkable.    Lymph nodes: No pathologically enlarged lymph nodes in the chest or axilla.    Lungs: Consolidation in the anterior segment of the left upper lobe, left lower lobe, lingula and right lower lobe with right pleural effusion is present.  Pleural effusion on the right has decreased but the parenchymal opacities in the left lung appear stable.    Visualized upper abdominal soft tissues: No significant abnormalities appreciated.    Bones: There is degenerative change of the thoracic spine.                                       US Lower Extremity Veins Bilateral (Final result)  Result time 09/28/22 17:31:20      Final result by Veda Sullivan MD (09/28/22 17:31:20)                   Impression:      No evidence of deep venous thrombosis in either lower extremity.      Electronically signed by: Veda Sullivan  Date:    09/28/2022  Time:    17:31               Narrative:    EXAMINATION:  ULTRASOUND LOWER EXTREMITY VEINS BILATERAL    CLINICAL HISTORY:  Other specified soft tissue disorders    TECHNIQUE:  Duplex and color flow Doppler and dynamic compression was performed of the bilateral lower extremity veins was performed.    COMPARISON:  06/10/2022    FINDINGS:  Right thigh veins: The common femoral, femoral, popliteal, upper greater saphenous, and deep femoral veins are patent and free of thrombus. The veins are normally compressible and have normal phasic flow and augmentation response.    Right calf veins: The visualized calf  veins are patent.    Left thigh veins: The common femoral, femoral, popliteal, upper greater saphenous, and deep femoral veins are patent and free of thrombus. The veins are normally compressible and have normal phasic flow and augmentation response.    Left calf veins: The visualized calf veins are patent.    Miscellaneous: None                                       X-Ray Chest AP Portable (Final result)  Result time 09/28/22 16:36:55      Final result by Sofei Ackerman MD (09/28/22 16:36:55)                   Impression:      Larger area of increased opacity left upper lobe could represent superimposed infection or progression of disease.      Electronically signed by: Sofie Ackerman MD  Date:    09/28/2022  Time:    16:36               Narrative:    EXAMINATION:  XR CHEST AP PORTABLE    CLINICAL HISTORY:  Chest Pain;    TECHNIQUE:  Single frontal view of the chest was performed.    COMPARISON:  09/04/2022    FINDINGS:  There is a port in the right upper thorax, distal tip in the SVC.  The cardiomediastinal silhouette is midline.  Abnormal increased attenuation left upper lobe and patchy increased attenuation at the bilateral lung basis.  Larger area of opacity left upper lobe compared to 06/10/2022 and 09/04/2022 could be due to a superimposed infectious process or progression of disease.  There is blunting of the left costal diaphragmatic angle which could indicate a small pleural effusion or pleural thickening, similar to the prior exam.  There is no pneumothorax.  The osseous structures appear normal.                                       Medications   aspirin tablet 325 mg (325 mg Oral Given 9/28/22 1644)   cefTRIAXone (ROCEPHIN) 1 g/50 mL D5W IVPB (1 g Intravenous New Bag 9/28/22 1705)   albuterol-ipratropium 2.5 mg-0.5 mg/3 mL nebulizer solution 3 mL (3 mLs Nebulization Given 9/28/22 1725)   iohexoL (OMNIPAQUE 350) injection 75 mL (75 mLs Intravenous Given 9/28/22 1745)     Medical Decision  Making:   History:   Old Medical Records: I decided to obtain old medical records.  Independently Interpreted Test(s):   I have ordered and independently interpreted EKG Reading(s) - see prior notes  Clinical Tests:   Lab Tests: Ordered and Reviewed  Radiological Study: Reviewed and Ordered  Medical Tests: Ordered and Reviewed       Medical decision making   Chief Complaint: Shortness of breath and hypoxia  Differential diagnosis: Hypoxia, respiratory failure, pneumonia, PE, DVT.  Treatment in the ED included Physical Exam, and oxygen   Patient reports feeling better after treatment in the ER.    Discussed diagnosis, labs, and imaging results with the patient.    Patient is requiring additional oxygen saturation to maintain O2 sat greater than 90%.  Patient is on 5 L at this time.  Patient is agreeable to admission at [facility].  Patient and his wife at bedside are agreeable to admission.  Discussed possible need for hospice.  Patient is wife are both interested.  I spoke with shital Vee at 7:20 p.m.  Admitted to Hospital Medicine.  Discussed patient's presentation, past medical history, physical exam, labs, radiology results, vital signs, and ED course.  Consultation with DR PEREZ on call for Dr Murillo for admission cility.      At time of admit patient is awake alert oriented x4 speaking clearly in full sentences and moving all 4 extremities.       Scribe Attestation:   Scribe #1: I performed the above scribed service and the documentation accurately describes the services I performed. I attest to the accuracy of the note.                   Clinical Impression:   Final diagnoses:  [R06.02] SOB (shortness of breath)  [M79.89] Leg swelling  [R09.02] Hypoxia (Primary)           I, Dr. Teresa Swanson, personally performed the services described in this documentation. This document was produced by a scribe under my direction and in my presence. All medical record entries made by the scribe  were at my direction and in my presence.  I have reviewed the chart and agree that the record reflects my personal performance and is accurate and complete. Teresa Swanson DO.     09/28/2022 7:27 PM       Teresa Swanson DO  09/28/22 1929

## 2022-09-29 NOTE — ASSESSMENT & PLAN NOTE
Patient with Hypoxic Respiratory failure which is Acute on chronic.  he is on home oxygen at 2 LPM. Supplemental oxygen was provided and noted-  .   Signs/symptoms of respiratory failure include- increased work of breathing and respiratory distress. Contributing diagnoses includes - CHF and COPD Labs and images were reviewed. Patient Has not had a recent ABG. Will treat underlying causes and adjust management of respiratory failure as follows- See plan for heart failure/COPD     Doing better. Continue lasix today. Home likely tomorrow.

## 2022-09-29 NOTE — ASSESSMENT & PLAN NOTE
- Resume home medications (aspirin, statin and cilostazol)   - Follow up outpatient with cardiology

## 2022-09-29 NOTE — ASSESSMENT & PLAN NOTE
- Echocardiogram (2/22/2022): EF: 35%-40%, GIDD, normal RV systolic function, mild TR   - Echocardiogram (5/18/2022): EF: 65%, indeterminate LV diastolic function, mild TR, PA pressure of 54 mmHg  - Patient presented with worsening SOB and AMADEO   - Elevated BNP, compared to most recent readings   - Reports that he no longer takes his Lasix   - Suspect that he is in exacerbation     Plan:   - Lasix 40 mg IV BID   - Resume home medications     Increase lasix

## 2022-09-29 NOTE — NURSING
Pt's HR in 130s and 140s on exertion, not sustained. MD notified. No new orders.Will continue to monitor.

## 2022-09-29 NOTE — PROGRESS NOTES
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     The patient developed worsening shortness of breath one day prior to presentation. The patient reports that his SOB became worse because he was in his yard and was bothered by the heat. His episode was associated with worsening hypoxia documented at home (SpO2: 88%, on 3L, with occasional drops to the high 70s). His wife called the oncology clinic and they were instructed to present to the ED for further management. The patient denies having worsening cough and reports feeling fine at the time of examination. He denied having any chest pain but did report having worsening lower extremity edema. He is compliant with his medications but no longer taking Lasix. No fevers or chills. No urinary or GI symptoms. While in the ER, the patient was saturating 92% on 6L of supplemental O2, but otherwise was hemodynamically stable. Labs showed no leukocytosis (11.1), normocytic anemia (12.5), elevated BNP (447), negative lactate and procalcitonin (0.15). CTA chest showed no evidence of acute pulmonary thromboembolism, and a decrease in right pleural fluid collection with stable consolidated changes in the right and left lungs. He was administered Duo-Nebs, aspirin 325 mg and ceftriaxone. He was admitted for further management.       Overview/Hospital Course:  Patient was admitted to the  hospital for SOB- due to possible CHF/COPD.  Started on treatment for both with clinical improvement.       Interval History: Feels better. Very comfortable on NC    Review of Systems   Constitutional:  Negative for activity change.   HENT:  Negative for congestion and dental problem.    Eyes:  Negative for discharge and itching.   Respiratory:  Negative for chest tightness and shortness of breath.    Cardiovascular:  Negative for chest pain.   Gastrointestinal:  Negative for abdominal pain.   Genitourinary:  Negative for difficulty urinating and dysuria.   Musculoskeletal:  Negative for arthralgias.   Neurological:  Negative for dizziness and facial asymmetry.   Psychiatric/Behavioral:  Negative for agitation and behavioral problems.    Objective:     Vital Signs (Most Recent):  Temp: 100.3 °F (37.9 °C) (09/29/22 0451)  Pulse: 107 (09/29/22 0403)  Resp: 19 (09/29/22 0403)  BP: 124/64 (09/29/22 0403)  SpO2: (!) 92 % (09/29/22 0403)   Vital Signs (24h Range):  Temp:  [98.6 °F (37 °C)-101.2 °F (38.4 °C)] 100.3 °F (37.9 °C)  Pulse:  [] 107  Resp:  [19-26] 19  SpO2:  [85 %-98 %] 92 %  BP: (118-156)/(63-81) 124/64     Weight: 90.4 kg (199 lb 4.7 oz)  Body mass index is 28.6 kg/m².    Intake/Output Summary (Last 24 hours) at 9/29/2022 0624  Last data filed at 9/29/2022 0610  Gross per 24 hour   Intake 250 ml   Output 1900 ml   Net -1650 ml      Physical Exam  Vitals and nursing note reviewed.   Constitutional:       General: He is not in acute distress.     Appearance: Normal appearance. He is normal weight. He is not ill-appearing, toxic-appearing or diaphoretic.   Eyes:      Conjunctiva/sclera: Conjunctivae normal.   Cardiovascular:      Rate and Rhythm: Normal rate and regular rhythm.      Heart sounds:     No gallop.   Pulmonary:      Effort: Pulmonary effort is normal. No respiratory distress.   Skin:     General: Skin is warm and dry.   Neurological:      Mental Status: He is alert and oriented to person,  place, and time.   Psychiatric:         Mood and Affect: Mood normal.         Behavior: Behavior normal.         Thought Content: Thought content normal.       Significant Labs: All pertinent labs within the past 24 hours have been reviewed.  BMP:   Recent Labs   Lab 09/29/22  0523   *   *   K 4.2      CO2 23   BUN 15   CREATININE 0.9   CALCIUM 8.7   MG 1.5*     CBC:   Recent Labs   Lab 09/28/22  1616 09/29/22  0523   WBC 11.11 12.22   HGB 12.5* 13.0*   HCT 37.7* 38.4*    151       Significant Imaging:       Assessment/Plan:      * Acute on chronic respiratory failure with hypoxia  Patient with Hypoxic Respiratory failure which is Acute on chronic.  he is on home oxygen at 2 LPM. Supplemental oxygen was provided and noted-  .   Signs/symptoms of respiratory failure include- increased work of breathing and respiratory distress. Contributing diagnoses includes - CHF and COPD Labs and images were reviewed. Patient Has not had a recent ABG. Will treat underlying causes and adjust management of respiratory failure as follows- See plan for heart failure/COPD     Doing better. Continue lasix today. Home likely tomorrow.     Left leg cellulitis  - Patietn reports having AMADEO (L>R)   - Erythema and warmth noted on physical examination  - Likely related to underlying cellulitis     Plan:   - Vancomycin, pharmacy to dose  - Monitor progression/resolution       COPD (chronic obstructive pulmonary disease)  - History of COPD on supplemental O2   - Presented with worsening SOB   - Likely related to a COPD flare (in the setting of recent weather changes)     Plan:   - Prednisone 40 mg x 5 days   - Continue home inhalers   - Duo-Nebs scheduled  - Does not meet criteria for antibiotics     Coronary artery disease involving native coronary artery of native heart without angina pectoris  - Continue home medications (aspirin, Coreg, statin and Plavix)     PAD (peripheral artery disease)  - Resume home  medications (aspirin, statin and cilostazol)   - Follow up outpatient with cardiology     Chronic combined systolic and diastolic heart failure  - Echocardiogram (2/22/2022): EF: 35%-40%, GIDD, normal RV systolic function, mild TR   - Echocardiogram (5/18/2022): EF: 65%, indeterminate LV diastolic function, mild TR, PA pressure of 54 mmHg  - Patient presented with worsening SOB and AMADEO   - Elevated BNP, compared to most recent readings   - Reports that he no longer takes his Lasix   - Suspect that he is in exacerbation     Plan:   - Lasix 40 mg IV BID   - Resume home medications     Increase lasix     Essential hypertension  - Resume home medications (amlodipine 10 mg, Coreg 6.25 mg BID, Losartan 100 mg)     Lung cancer, main bronchus, left  - On maintenance chemotherapy and radiation   - Outpatient follow up with oncology       Hypomagnesemia- will replace     Obesity Body mass index is 28.6 kg/m². Weight loss as out patient       VTE Risk Mitigation (From admission, onward)           Ordered     heparin (porcine) injection 5,000 Units  Every 8 hours         09/28/22 2203     IP VTE HIGH RISK PATIENT  Once         09/28/22 2203     Place sequential compression device  Until discontinued         09/28/22 2203                    Discharge Planning   AZ:      Code Status: Full Code   Is the patient medically ready for discharge?:     Reason for patient still in hospital (select all that apply): Patient unstable         Home likely on 9/30            Carroll Syed MD  Department of Hospital Medicine   [facility]

## 2022-09-29 NOTE — NURSING
Pt's HR sustaining on the 130's, patient Denies chest pain nor feeling of palpitations. Dr. Ambriz notified - ordered one time IV Lopressor and 12 Lead EKG.     Recheck

## 2022-09-29 NOTE — SUBJECTIVE & OBJECTIVE
Interval History: Feels better. Very comfortable on NC    Review of Systems   Constitutional:  Negative for activity change.   HENT:  Negative for congestion and dental problem.    Eyes:  Negative for discharge and itching.   Respiratory:  Negative for chest tightness and shortness of breath.    Cardiovascular:  Negative for chest pain.   Gastrointestinal:  Negative for abdominal pain.   Genitourinary:  Negative for difficulty urinating and dysuria.   Musculoskeletal:  Negative for arthralgias.   Neurological:  Negative for dizziness and facial asymmetry.   Psychiatric/Behavioral:  Negative for agitation and behavioral problems.    Objective:     Vital Signs (Most Recent):  Temp: 100.3 °F (37.9 °C) (09/29/22 0451)  Pulse: 107 (09/29/22 0403)  Resp: 19 (09/29/22 0403)  BP: 124/64 (09/29/22 0403)  SpO2: (!) 92 % (09/29/22 0403)   Vital Signs (24h Range):  Temp:  [98.6 °F (37 °C)-101.2 °F (38.4 °C)] 100.3 °F (37.9 °C)  Pulse:  [] 107  Resp:  [19-26] 19  SpO2:  [85 %-98 %] 92 %  BP: (118-156)/(63-81) 124/64     Weight: 90.4 kg (199 lb 4.7 oz)  Body mass index is 28.6 kg/m².    Intake/Output Summary (Last 24 hours) at 9/29/2022 0624  Last data filed at 9/29/2022 0610  Gross per 24 hour   Intake 250 ml   Output 1900 ml   Net -1650 ml      Physical Exam  Vitals and nursing note reviewed.   Constitutional:       General: He is not in acute distress.     Appearance: Normal appearance. He is normal weight. He is not ill-appearing, toxic-appearing or diaphoretic.   Eyes:      Conjunctiva/sclera: Conjunctivae normal.   Cardiovascular:      Rate and Rhythm: Normal rate and regular rhythm.      Heart sounds:     No gallop.   Pulmonary:      Effort: Pulmonary effort is normal. No respiratory distress.   Skin:     General: Skin is warm and dry.   Neurological:      Mental Status: He is alert and oriented to person, place, and time.   Psychiatric:         Mood and Affect: Mood normal.         Behavior: Behavior normal.          Thought Content: Thought content normal.       Significant Labs: All pertinent labs within the past 24 hours have been reviewed.  BMP:   Recent Labs   Lab 09/29/22  0523   *   *   K 4.2      CO2 23   BUN 15   CREATININE 0.9   CALCIUM 8.7   MG 1.5*     CBC:   Recent Labs   Lab 09/28/22  1616 09/29/22  0523   WBC 11.11 12.22   HGB 12.5* 13.0*   HCT 37.7* 38.4*    151       Significant Imaging:

## 2022-09-29 NOTE — ASSESSMENT & PLAN NOTE
- Echocardiogram (2/22/2022): EF: 35%-40%, GIDD, normal RV systolic function, mild TR   - Echocardiogram (5/18/2022): EF: 65%, indeterminate LV diastolic function, mild TR, PA pressure of 54 mmHg  - Patient presented with worsening SOB and AMADEO   - Elevated BNP, compared to most recent readings   - Reports that he no longer takes his Lasix   - Suspect that he is in exacerbation     Plan:   - Lasix 40 mg IV BID   - Resume home medications

## 2022-09-29 NOTE — PROGRESS NOTES
Pharmacokinetic Initial Assessment: IV Vancomycin    Assessment/Plan:    Initiate intravenous vancomycin with loading dose of 1750 mg once followed by a maintenance dose of vancomycin 1250mg IV every 12 hours  Desired empiric serum trough concentration is 10 to 20 mcg/mL  Draw vancomycin trough level 60 min prior to fourth dose on 9/30/22 at approximately 10:00  Pharmacy will continue to follow and monitor vancomycin.      Please contact pharmacy at extension [phone] with any questions regarding this assessment.     Thank you for the consult,   Dayan Castro       Patient brief summary:  Kashif Iverson is a 61 y.o. male initiated on antimicrobial therapy with IV Vancomycin for treatment of suspected skin & soft tissue infection    Drug Allergies:   Review of patient's allergies indicates:  No Known Allergies    Actual Body Weight:   91.5 kg    Renal Function:   Estimated Creatinine Clearance: 88.2 mL/min (based on SCr of 1 mg/dL).,     Dialysis Method (if applicable):  N/A    CBC (last 72 hours):  Recent Labs   Lab Result Units 09/28/22  1616   WBC K/uL 11.11   Hemoglobin g/dL 12.5*   Hematocrit % 37.7*   Platelets K/uL 193   Gran % % 88.8*   Lymph % % 5.0*   Mono % % 4.6   Eosinophil % % 0.9   Basophil % % 0.3   Differential Method  Automated       Metabolic Panel (last 72 hours):  Recent Labs   Lab Result Units 09/28/22  1616   Sodium mmol/L 139   Potassium mmol/L 4.2   Chloride mmol/L 105   CO2 mmol/L 26   Glucose mg/dL 170*   BUN mg/dL 16   Creatinine mg/dL 1.0   Albumin g/dL 3.5   Total Bilirubin mg/dL 1.1*   Alkaline Phosphatase U/L 88   AST U/L 13   ALT U/L 10       Drug levels (last 3 results):  No results for input(s): VANCOMYCINRA, VANCORANDOM, VANCOMYCINPE, VANCOPEAK, VANCOMYCINTR, VANCOTROUGH in the last 72 hours.    Microbiologic Results:  Microbiology Results (last 7 days)       Procedure Component Value Units Date/Time    Blood Culture #2 **CANNOT BE ORDERED STAT** [[id number]] Collected: 09/28/22 3747     Order Status: Sent Specimen: Blood from Peripheral, Antecubital, Right Updated: 09/28/22 1651    Blood Culture #1 **CANNOT BE ORDERED STAT** [[id number]] Collected: 09/28/22 1641    Order Status: Sent Specimen: Blood from Peripheral, Hand, Left Updated: 09/28/22 1651

## 2022-09-29 NOTE — SUBJECTIVE & OBJECTIVE
Past Medical History:   Diagnosis Date    Combined systolic and diastolic heart failure     Hypertension     Lung cancer     NSCLC    Lung mass     left lung       Past Surgical History:   Procedure Laterality Date    BRONCHOSCOPY N/A 8/30/2019    Procedure: Bronchoscopy;  Surgeon: Miguel Diagnostic Provider;  Location: [facility] OR [address] [place] [address];  Service: Anesthesiology;  Laterality: N/A;    CORONARY ANGIOGRAPHY N/A 3/4/2022    Procedure: ANGIOGRAM, CORONARY ARTERY;  Surgeon: Robson Green MD;  Location: [facility] CATH LAB;  Service: Cardiology;  Laterality: N/A;       Review of patient's allergies indicates:  No Known Allergies    No current facility-administered medications on file prior to encounter.     Current Outpatient Medications on File Prior to Encounter   Medication Sig    albuterol (VENTOLIN HFA) 90 mcg/actuation inhaler Inhale 2 puffs into the lungs every 6 (six) hours as needed for Wheezing. Rescue    amLODIPine (NORVASC) 10 MG tablet Take 1 tablet (10 mg total) by mouth once daily.    ascorbic acid-multivit-min (VITAMIN C ENERGY BOOSTER) 1,000 mg PwEP Take 3,000 mg by mouth once daily. Patient takes 3,000 mg per day    aspirin 81 MG Chew Take 1 tablet (81 mg total) by mouth once daily.    atorvastatin (LIPITOR) 80 MG tablet Take 1 tablet (80 mg total) by mouth once daily.    carvediloL (COREG) 6.25 MG tablet Take 1 tablet (6.25 mg total) by mouth 2 (two) times daily.    cilostazoL (PLETAL) 100 MG Tab Take 1 tablet (100 mg total) by mouth 2 (two) times daily.    clopidogreL (PLAVIX) 75 mg tablet Take 1 tablet (75 mg total) by mouth once daily.    fluticasone-umeclidin-vilanter (TRELEGY ELLIPTA) 100-62.5-25 mcg DsDv Inhale 1 puff into the lungs once daily.    losartan (COZAAR) 100 MG tablet Take 1 tablet (100 mg total) by mouth once daily.    potassium chloride SA (K-DUR,KLOR-CON) 20 MEQ tablet Take 2 tablets (40 mEq total) by mouth once daily. for 5 days    predniSONE (DELTASONE) 10 MG tablet Take 3 pills by mouth  daily x 5 days, then take 2 pills by mouth daily x 5 days, then take one pill daily x 5 days    furosemide (LASIX) 40 MG tablet Take 1 tablet (40 mg total) by mouth 2 (two) times a day.     Family History       Problem Relation (Age of Onset)    Cancer Father, Sister    Diabetes Mother    Heart defect Mother    No Known Problems Son          Tobacco Use    Smoking status: Former     Packs/day: 1.00     Years: 25.00     Pack years: 25.00     Types: Cigarettes     Quit date:      Years since quittin.7    Smokeless tobacco: Never   Substance and Sexual Activity    Alcohol use: Yes     Comment: ocassionally    Drug use: Never    Sexual activity: Yes     Partners: Female     Review of Systems   Constitutional: Negative.    HENT: Negative.     Eyes: Negative.    Respiratory:  Positive for shortness of breath.    Cardiovascular:  Positive for leg swelling.   Gastrointestinal: Negative.    Endocrine: Negative.    Genitourinary: Negative.    Musculoskeletal: Negative.    Allergic/Immunologic: Negative.    Neurological: Negative.    Hematological: Negative.    Psychiatric/Behavioral: Negative.     Objective:     Vital Signs (Most Recent):  Temp: 98.6 °F (37 °C) (22)  Pulse: 102 (22)  Resp: 20 (22)  BP: (!) 142/81 (22)  SpO2: (!) 92 % (22)   Vital Signs (24h Range):  Temp:  [98.6 °F (37 °C)-99.4 °F (37.4 °C)] 98.6 °F (37 °C)  Pulse:  [] 102  Resp:  [20-26] 20  SpO2:  [85 %-98 %] 92 %  BP: (118-156)/(63-81) 142/81     Weight: 91.5 kg (201 lb 11.5 oz)  Body mass index is 28.94 kg/m².    Physical Exam  Constitutional:       General: He is not in acute distress.     Appearance: Normal appearance.   HENT:      Head: Normocephalic.      Nose: Nose normal. No congestion.      Mouth/Throat:      Mouth: Mucous membranes are moist.   Eyes:      Extraocular Movements: Extraocular movements intact.   Cardiovascular:      Rate and Rhythm: Normal rate.      Pulses: Normal  pulses.      Heart sounds: No murmur heard.  Pulmonary:      Effort: Pulmonary effort is normal.      Comments: Faint expiratory wheezes with diminished air entry bilaterally.   Abdominal:      General: Abdomen is flat.      Palpations: Abdomen is soft.   Musculoskeletal:      Cervical back: Neck supple.      Right lower leg: Edema present.      Left lower leg: Edema present.      Comments: Left lower extremity swelling (+3) with well demarkated erythema and warmth    Neurological:      General: No focal deficit present.      Mental Status: He is alert and oriented to person, place, and time.   Psychiatric:         Mood and Affect: Mood normal.           Significant Labs: All pertinent labs within the past 24 hours have been reviewed.    Significant Imaging: I have reviewed all pertinent imaging results/findings within the past 24 hours.

## 2022-09-29 NOTE — NURSING
Pt transferred to [facility] via stretcher. VSS.  Pt oriented to room and call light, bed locked and in lowest position, bedside table and call light in reach. Pt in no distress. Will cont to monitor.

## 2022-09-29 NOTE — HPI
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     The patient developed worsening shortness of breath one day prior to presentation. The patient reports that his SOB became worse because he was in his yard and was bothered by the heat. His episode was associated with worsening hypoxia documented at home (SpO2: 88%, on 3L, with occasional drops to the high 70s). His wife called the oncology clinic and they were instructed to present to the ED for further management. The patient denies having worsening cough and reports feeling fine at the time of examination. He denied having any chest pain but did report having worsening lower extremity edema. He is compliant with his medications but no longer taking Lasix. No fevers or chills. No urinary or GI symptoms. While in the ER, the patient was saturating 92% on 6L of supplemental O2, but otherwise was hemodynamically stable. Labs showed no leukocytosis (11.1), normocytic anemia (12.5), elevated BNP (447), negative lactate and procalcitonin (0.15). CTA chest showed no evidence of acute pulmonary thromboembolism, and a decrease in right pleural fluid collection with stable consolidated changes in the right and left lungs. He was administered Duo-Nebs, aspirin 325 mg and ceftriaxone. He was admitted for further management.

## 2022-09-29 NOTE — ED NOTES
Received report from NEERAJ Edouard. Introduced self at bedside, pt is resting comfortably, denies any pain at this time. Pt is alert, calm, and oriented x4, no acute distress noted.

## 2022-09-29 NOTE — PLAN OF CARE
[place] [facility] S[facility]  Initial Discharge Assessment    SW completed the initial assessment with patient's wife over the phone.        Primary Care Provider: Guerline Higgins MD    Admission Diagnosis: SOB (shortness of breath) [R06.02]  Leg swelling [M79.89]  Hypoxia [R09.02]    Admission Date: 9/28/2022  Expected Discharge Date:     Discharge Barriers Identified: None    Payor: MEDICARE / Plan: MEDICARE PART A & B / Product Type: Government /     Extended Emergency Contact Information  Primary Emergency Contact: Natty Iverson  Mobile Phone: [phone]  Relation: Spouse  Preferred language: English   needed? No  Secondary Emergency Contact: Rolando Iverson  Mobile Phone: [phone]  Relation: Brother  Preferred language: English   needed? No    Discharge Plan A: Home Health  Discharge Plan B: Home with family (Home with family)      [id number] [address] AT [address] [address]  Phone: [phone] Fax: [phone]      Initial Assessment (most recent)       Adult Discharge Assessment - 09/29/22 1415          Discharge Assessment    Assessment Type Discharge Planning Assessment     Confirmed/corrected address, phone number and insurance Yes     Confirmed Demographics Correct on Facesheet     Source of Information family     If unable to respond/provide information was family/caregiver contacted? Yes     Contact Name/Number Natty Iverson (Spouse)   [phone] (Mobile)     When was your last doctors appointment? 09/23/22     Does patient/caregiver understand observation status No     Was observation education provided? No     Communicated [place] with patient/caregiver Yes     Reason For Admission Acute on chronic respiratory failure with hypoxia     Lives With spouse     Facility Arrived From: Home     Do you expect to return to your current living situation? Yes     Do you have help at home or someone to help you  manage your care at home? Yes     Who are your caregiver(s) and their phone number(s)? Natty Iverson (Spouse)   [phone] (Mobile)     Prior to hospitilization cognitive status: Unable to Assess     Current cognitive status: Unable to Assess     Walking or Climbing Stairs Difficulty none     Dressing/Bathing Difficulty bathing difficulty, assistance 1 person     Equipment Currently Used at Home oxygen     Readmission within 30 days? No   Patient was in observation 3 weeks ago at [facility]    Patient currently being followed by outpatient case management? No     Do you currently have service(s) that help you manage your care at home? Yes     Name and Contact number of agency Located within [facility] home health     Is the pt/caregiver preference to resume services with current agency Yes     Do you take prescription medications? Yes     Do you have prescription coverage? Yes     Do you have any problems affording any of your prescribed medications? Yes     If yes, what medications? Wife stated that most of his medictions are not covered by medicare she has to pay and sometimes medications are very expensive.     Is the patient taking medications as prescribed? yes     Who is going to help you get home at discharge? Natty Iverson (Spouse)   [phone] (Mobile)     How do you get to doctors appointments? family or friend will provide     Are you on dialysis? No     Do you take coumadin? No     Discharge Plan A Home Health     Discharge Plan B Home with family   Home with family    DME Needed Upon Discharge  none     Discharge Plan discussed with: Spouse/sig other     Name(s) and Number(s) Natty Iverson (Spouse)   [phone] (Mobile)     Discharge Barriers Identified None        Relationship/Environment    Name(s) of Who Lives With Patient Natty Iverson (Spouse)   [phone] (Mobile)

## 2022-09-29 NOTE — ASSESSMENT & PLAN NOTE
- Patietn reports having AMADEO (L>R)   - Erythema and warmth noted on physical examination  - Likely related to underlying cellulitis     Plan:   - Vancomycin, pharmacy to dose  - Monitor progression/resolution

## 2022-09-29 NOTE — HOSPITAL COURSE
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  Required increased supplemental O2. Started on treatment.  Pulmonary consulted-suspect due to Afib with RVR. Weaning down O2.  Palliative care was consulted as well. Cards consulted for afib RVR- continue po metoprolol with IV as needed. EF 60% on echo. Switch to DOAC prior to discharge. PT/OT consulted as well and rec: H/H without DME.  Patient continues to have intermittent confusion. Concern for possible mets to the brain. Oncology consulted-suspect confusion due to hypoxia. CT head showed Remote ischemic infarct right anterior frontal and right parietooccipital junction stable unchanged. No new stroke, hemorrhage, subdural fluid or mass. Mental status improving, appears at  baseline.  Patient was able to lean over to down to 2 L nasal cannula, which is his home oxygen.    Patient states he is doing better.  States that shortness of breath is at baseline, and denies any difficulty breathing.  Currently on 2 L nasal cannula.  Wants to really go home.  Alert and oriented x4. Pt denies any fever, headaches, vision changes, chest pain, palpitations, abdominal pain, nausea, vomiting, or any new weaknesses. Feels ready to go home. Patient's exam on discharge was as follow: Patient is alert and oriented, appears in no acute distress, heart with regular rate and irregular rhythm, lungs with diminished breath sounds, abdomen soft and nontender, and no new weaknesses or focal deficits seen. Bilateral lower extremities without any edema or calf tenderness.     Patient was counseled regarding any abnormal labs, differential diagnosis, treatment options, risk-benefit, lifestyle changes,  prognosis, current condition, and medications. Patient was interactive and attentive.  Patient's questions were answered in a respectful and timely manner. Patient was instructed to follow-up with PCP within 1 week and to continue taking medications as prescribed.  Instructed to also follow up with cardiology outpatient. Also, extensively discussed the risks, benefits, and side effects of patient's medications. Discussed with patient about any medication changes. Patient verbalized understanding and agrees to treatment plan.  Patient is stable for discharge.  Patient has no other questions or concerns at this time.  ED precautions discussed with the patient.    Vital signs are stable. Ambulating without any difficulty. Tolerating p.o. intake without any nausea or vomiting. Afebrile for over 24 hours. Patient is in stable condition and has no questions or concerns. Patient will be discharge to home with home health once that is set up and transportation is secured. Prescriptions sent to pharmacy.  CM/SW to assist with discharge planning.

## 2022-09-29 NOTE — ASSESSMENT & PLAN NOTE
Patient with Hypoxic Respiratory failure which is Acute on chronic.  he is on home oxygen at 2 LPM. Supplemental oxygen was provided and noted-  .   Signs/symptoms of respiratory failure include- increased work of breathing and respiratory distress. Contributing diagnoses includes - CHF and COPD Labs and images were reviewed. Patient Has not had a recent ABG. Will treat underlying causes and adjust management of respiratory failure as follows- See plan for heart failure/COPD

## 2022-09-29 NOTE — PLAN OF CARE
Problem: Adult Inpatient Plan of Care  Goal: Plan of Care Review  Outcome: Ongoing, Progressing  Goal: Optimal Comfort and Wellbeing  Outcome: Ongoing, Progressing  Goal: Readiness for Transition of Care  Outcome: Ongoing, Progressing     Pt free from falls or any further trauma through out the shift. Prescribed medication given No complaints of pain. Pt on 5 lpm O2 via high flow nasal cannula. Pt in no distress. Will continue to monitor.

## 2022-09-29 NOTE — NURSING
Nurses Note -- 4 Eyes      9/29/2022   12 am      Skin assessed during: Admit      [x] No Pressure Injuries Present    []Prevention Measures Documented      [] Yes- Altered Skin Integrity Present or Discovered   [] LDA Added if Not in Epic (Describe Wound)   [] New Altered Skin Integrity was Present on Admit and Documented in LDA   [] Wound Image Taken    Wound Care Consulted? No    Attending Nurse:  Reyes Malcolm RN     Second RN/Staff Member:  Anjali osuna RN

## 2022-09-29 NOTE — ASSESSMENT & PLAN NOTE
- History of COPD on supplemental O2   - Presented with worsening SOB   - Likely related to a COPD flare (in the setting of recent weather changes)     Plan:   - Prednisone 40 mg x 5 days   - Continue home inhalers   - Duo-Nebs scheduled  - Does not meet criteria for antibiotics

## 2022-09-29 NOTE — H&P
[facility]  History & Physical    Patient Name: Kashif Iverson  MRN: [id number]  Patient Class: IP- Inpatient  Admission Date: 9/28/2022  Attending Physician: David Marroquin MD  Primary Care Provider: Mario Hathaway MD         Patient information was obtained from patient and ER records.     Subjective:     Principal Problem:<principal problem not specified>    Chief Complaint:   Chief Complaint   Patient presents with    Shortness of Breath     Wife presents with patient, states the patient's pulse ox read 74% earlier today and his provider sent them here. Pt is stage 4 lung cancer on active chemo, 2 L NC at all times. Pt reports he had an episode of SOB yesterday.         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     The patient developed worsening shortness of breath one day prior to presentation. The patient reports that his SOB became worse because he was in his yard and was bothered by the heat. His episode was associated with worsening hypoxia documented at home (SpO2: 88%, on 3L, with occasional drops to the high 70s). His wife called the oncology clinic and they were instructed to present to the ED for further management. The patient denies having worsening cough and reports feeling fine at the time of examination. He denied having any chest pain but did report having worsening lower extremity edema. He is compliant with his medications but no longer taking Lasix. No fevers or chills. No urinary or GI symptoms. While in the ER, the patient was saturating 92% on 6L of supplemental O2, but otherwise was hemodynamically stable. Labs showed no leukocytosis (11.1), normocytic anemia (12.5), elevated BNP (447), negative lactate and procalcitonin (0.15). CTA  chest showed no evidence of acute pulmonary thromboembolism, and a decrease in right pleural fluid collection with stable consolidated changes in the right and left lungs. He was administered Duo-Nebs, aspirin 325 mg and ceftriaxone. He was admitted for further management.       Past Medical History:   Diagnosis Date    Combined systolic and diastolic heart failure     Hypertension     Lung cancer     NSCLC    Lung mass     left lung       Past Surgical History:   Procedure Laterality Date    BRONCHOSCOPY N/A 8/30/2019    Procedure: Bronchoscopy;  [facility];  Location: [facility] OR [address] [place] [address];  Service: Anesthesiology;  Laterality: N/A;    CORONARY ANGIOGRAPHY N/A 3/4/2022    Procedure: ANGIOGRAM, CORONARY ARTERY;  Surgeon: Robson Green MD;  Location: [facility] CATH LAB;  Service: Cardiology;  Laterality: N/A;       Review of patient's allergies indicates:  No Known Allergies    No current facility-administered medications on file prior to encounter.     Current Outpatient Medications on File Prior to Encounter   Medication Sig    albuterol (VENTOLIN HFA) 90 mcg/actuation inhaler Inhale 2 puffs into the lungs every 6 (six) hours as needed for Wheezing. Rescue    amLODIPine (NORVASC) 10 MG tablet Take 1 tablet (10 mg total) by mouth once daily.    ascorbic acid-multivit-min (VITAMIN C ENERGY BOOSTER) 1,000 mg PwEP Take 3,000 mg by mouth once daily. Patient takes 3,000 mg per day    aspirin 81 MG Chew Take 1 tablet (81 mg total) by mouth once daily.    atorvastatin (LIPITOR) 80 MG tablet Take 1 tablet (80 mg total) by mouth once daily.    carvediloL (COREG) 6.25 MG tablet Take 1 tablet (6.25 mg total) by mouth 2 (two) times daily.    cilostazoL (PLETAL) 100 MG Tab Take 1 tablet (100 mg total) by mouth 2 (two) times daily.    clopidogreL (PLAVIX) 75 mg tablet Take 1 tablet (75 mg total) by mouth once daily.    fluticasone-umeclidin-vilanter (TRELEGY ELLIPTA) 100-62.5-25 mcg DsDv Inhale 1  puff into the lungs once daily.    losartan (COZAAR) 100 MG tablet Take 1 tablet (100 mg total) by mouth once daily.    potassium chloride SA (K-DUR,KLOR-CON) 20 MEQ tablet Take 2 tablets (40 mEq total) by mouth once daily. for 5 days    predniSONE (DELTASONE) 10 MG tablet Take 3 pills by mouth daily x 5 days, then take 2 pills by mouth daily x 5 days, then take one pill daily x 5 days    furosemide (LASIX) 40 MG tablet Take 1 tablet (40 mg total) by mouth 2 (two) times a day.     Family History       Problem Relation (Age of Onset)    Cancer Father, Sister    Diabetes Mother    Heart defect Mother    No Known Problems Son          Tobacco Use    Smoking status: Former     Packs/day: 1.00     Years: 25.00     Pack years: 25.00     Types: Cigarettes     Quit date:      Years since quittin.7    Smokeless tobacco: Never   Substance and Sexual Activity    Alcohol use: Yes     Comment: ocassionally    Drug use: Never    Sexual activity: Yes     Partners: Female     Review of Systems   Constitutional: Negative.    HENT: Negative.     Eyes: Negative.    Respiratory:  Positive for shortness of breath.    Cardiovascular:  Positive for leg swelling.   Gastrointestinal: Negative.    Endocrine: Negative.    Genitourinary: Negative.    Musculoskeletal: Negative.    Allergic/Immunologic: Negative.    Neurological: Negative.    Hematological: Negative.    Psychiatric/Behavioral: Negative.     Objective:     Vital Signs (Most Recent):  Temp: 98.6 °F (37 °C) (22)  Pulse: 102 (22)  Resp: 20 (22)  BP: (!) 142/81 (22)  SpO2: (!) 92 % (22)   Vital Signs (24h Range):  Temp:  [98.6 °F (37 °C)-99.4 °F (37.4 °C)] 98.6 °F (37 °C)  Pulse:  [] 102  Resp:  [20-26] 20  SpO2:  [85 %-98 %] 92 %  BP: (118-156)/(63-81) 142/81     Weight: 91.5 kg (201 lb 11.5 oz)  Body mass index is 28.94 kg/m².    Physical Exam  Constitutional:       General: He is not in acute distress.      Appearance: Normal appearance.   HENT:      Head: Normocephalic.      Nose: Nose normal. No congestion.      Mouth/Throat:      Mouth: Mucous membranes are moist.   Eyes:      Extraocular Movements: Extraocular movements intact.   Cardiovascular:      Rate and Rhythm: Normal rate.      Pulses: Normal pulses.      Heart sounds: No murmur heard.  Pulmonary:      Effort: Pulmonary effort is normal.      Comments: Faint expiratory wheezes with diminished air entry bilaterally.   Abdominal:      General: Abdomen is flat.      Palpations: Abdomen is soft.   Musculoskeletal:      Cervical back: Neck supple.      Right lower leg: Edema present.      Left lower leg: Edema present.      Comments: Left lower extremity swelling (+3) with well demarkated erythema and warmth    Neurological:      General: No focal deficit present.      Mental Status: He is alert and oriented to person, place, and time.   Psychiatric:         Mood and Affect: Mood normal.           Significant Labs: All pertinent labs within the past 24 hours have been reviewed.    Significant Imaging: I have reviewed all pertinent imaging results/findings within the past 24 hours.    Assessment/Plan:     * Acute on chronic respiratory failure with hypoxia  Patient with Hypoxic Respiratory failure which is Acute on chronic.  he is on home oxygen at 2 LPM. Supplemental oxygen was provided and noted-  .   Signs/symptoms of respiratory failure include- increased work of breathing and respiratory distress. Contributing diagnoses includes - CHF and COPD Labs and images were reviewed. Patient Has not had a recent ABG. Will treat underlying causes and adjust management of respiratory failure as follows- See plan for heart failure/COPD     Left leg cellulitis  - Yarietn reports having AMADEO (L>R)   - Erythema and warmth noted on physical examination  - Likely related to underlying cellulitis     Plan:   - Vancomycin, pharmacy to dose  - Monitor progression/resolution        COPD (chronic obstructive pulmonary disease)  - History of COPD on supplemental O2   - Presented with worsening SOB   - Likely related to a COPD flare (in the setting of recent weather changes)     Plan:   - Prednisone 40 mg x 5 days   - Continue home inhalers   - Duo-Nebs scheduled  - Does not meet criteria for antibiotics     Coronary artery disease involving native coronary artery of native heart without angina pectoris  - Continue home medications (aspirin, Coreg, statin and Plavix)     PAD (peripheral artery disease)  - Resume home medications (aspirin, statin and cilostazol)   - Follow up outpatient with cardiology     Chronic combined systolic and diastolic heart failure  - Echocardiogram (2/22/2022): EF: 35%-40%, GIDD, normal RV systolic function, mild TR   - Echocardiogram (5/18/2022): EF: 65%, indeterminate LV diastolic function, mild TR, PA pressure of 54 mmHg  - Patient presented with worsening SOB and AMADEO   - Elevated BNP, compared to most recent readings   - Reports that he no longer takes his Lasix   - Suspect that he is in exacerbation     Plan:   - Lasix 40 mg IV BID   - Resume home medications     Essential hypertension  - Resume home medications (amlodipine 10 mg, Coreg 6.25 mg BID, Losartan 100 mg)     Lung cancer, main bronchus, left  - On maintenance chemotherapy and radiation   - Outpatient follow up with oncology       VTE Risk Mitigation (From admission, onward)         Ordered     heparin (porcine) injection 5,000 Units  Every 8 hours         09/28/22 2203     IP VTE HIGH RISK PATIENT  Once         09/28/22 2203     Place sequential compression device  Until discontinued         09/28/22 2203                   David Marroquin MD  Department of Hospital Medicine   [facility] - Med Surg

## 2022-09-30 PROBLEM — I48.19 PERSISTENT ATRIAL FIBRILLATION: Status: ACTIVE | Noted: 2022-01-01

## 2022-09-30 NOTE — SUBJECTIVE & OBJECTIVE
Past Medical History:   Diagnosis Date    Combined systolic and diastolic heart failure     Hypertension     Lung cancer     NSCLC    Lung mass     left lung       Past Surgical History:   Procedure Laterality Date    BRONCHOSCOPY N/A 2019    Procedure: Bronchoscopy;  Surgeon: [facility] Diagnostic Provider;  Location: [address];  Service: Anesthesiology;  Laterality: N/A;    CORONARY ANGIOGRAPHY N/A 3/4/2022    Procedure: ANGIOGRAM, CORONARY ARTERY;  Surgeon: Robson Green MD;  Location: [facility] CATH LAB;  Service: Cardiology;  Laterality: N/A;       Review of patient's allergies indicates:  No Known Allergies    Family History       Problem Relation (Age of Onset)    Cancer Father, Sister    Diabetes Mother    Heart defect Mother    No Known Problems Son          Tobacco Use    Smoking status: Former     Packs/day: 1.00     Years: 25.00     Pack years: 25.00     Types: Cigarettes     Quit date:      Years since quittin.7    Smokeless tobacco: Never   Substance and Sexual Activity    Alcohol use: Yes     Comment: ocassionally    Drug use: Never    Sexual activity: Yes     Partners: Female         Review of Systems   Constitutional:  Positive for activity change and fatigue. Negative for chills, diaphoresis and fever.   HENT:  Negative for congestion, postnasal drip and rhinorrhea.    Eyes: Negative.    Respiratory:  Positive for shortness of breath. Negative for cough, choking and chest tightness.    Cardiovascular:  Positive for palpitations and leg swelling. Negative for chest pain.   Gastrointestinal:  Negative for abdominal distention, abdominal pain, constipation, diarrhea, nausea and vomiting.   Endocrine: Negative.    Genitourinary: Negative.    Musculoskeletal: Negative.    Skin:  Negative for color change, pallor, rash and wound.   Allergic/Immunologic: Negative.    Neurological: Negative.    Hematological: Negative.    Psychiatric/Behavioral: Negative.     Objective:     Vital Signs (Most  Recent):  Temp: 99 °F (37.2 °C) (09/30/22 1112)  Pulse: 92 (09/30/22 1503)  Resp: 19 (09/30/22 1503)  BP: 100/61 (09/30/22 1112)  SpO2: 95 % (09/30/22 1503) Vital Signs (24h Range):  Temp:  [99 °F (37.2 °C)-100.1 °F (37.8 °C)] 99 °F (37.2 °C)  Pulse:  [] 92  Resp:  [18-22] 19  SpO2:  [90 %-95 %] 95 %  BP: (100-133)/(61-90) 100/61     Weight: 89.1 kg (196 lb 6.9 oz)  Body mass index is 28.18 kg/m².      Intake/Output Summary (Last 24 hours) at 9/30/2022 1635  Last data filed at 9/30/2022 1540  Gross per 24 hour   Intake 444.77 ml   Output 1650 ml   Net -1205.23 ml       Physical Exam  Constitutional:       General: He is not in acute distress.     Appearance: Normal appearance. He is normal weight. He is not ill-appearing or toxic-appearing.   HENT:      Head: Normocephalic and atraumatic.      Nose: Nose normal.      Mouth/Throat:      Mouth: Mucous membranes are dry.   Eyes:      Extraocular Movements: Extraocular movements intact.      Pupils: Pupils are equal, round, and reactive to light.   Cardiovascular:      Rate and Rhythm: Tachycardia present. Rhythm irregular.      Heart sounds: No murmur heard.    No friction rub. No gallop.   Pulmonary:      Effort: Pulmonary effort is normal.      Comments: Crackles noted diffusely, mild in nature.  Abdominal:      General: Abdomen is flat. Bowel sounds are normal. There is no distension.      Palpations: Abdomen is soft.   Musculoskeletal:         General: Normal range of motion.      Cervical back: Normal range of motion.      Right lower leg: Edema present.      Left lower leg: Edema present.   Skin:     General: Skin is warm.   Neurological:      General: No focal deficit present.      Mental Status: He is alert and oriented to person, place, and time. Mental status is at baseline.   Psychiatric:         Mood and Affect: Mood normal.         Behavior: Behavior normal.         Thought Content: Thought content normal.         Judgment: Judgment normal.        Vents:  Oxygen Concentration (%): 40 (09/30/22 1503)    Lines/Drains/Airways       Central Venous Catheter Line  Duration             Port A Cath Single Lumen right subclavian -- days              Peripheral Intravenous Line  Duration                  Peripheral IV - Single Lumen 09/28/22 1541 20 G Left Antecubital 2 days                    Significant Labs:    CBC/Anemia Profile:  Recent Labs   Lab 09/29/22  0523   WBC 12.22   HGB 13.0*   HCT 38.4*      MCV 92   RDW 14.6*        Chemistries:  Recent Labs   Lab 09/29/22  0523 09/30/22  1033   *  --    K 4.2  --      --    CO2 23  --    BUN 15  --    CREATININE 0.9  --    CALCIUM 8.7  --    MG 1.5* 1.7   PHOS 2.8  --        All pertinent labs within the past 24 hours have been reviewed.    Significant Imaging:   I have reviewed all pertinent imaging results/findings within the past 24 hours.

## 2022-09-30 NOTE — NURSING
Pt's HR sustaining in the 120s, no complaints of chest pain. Temp 99.9. Remote JACKI Valdez notified. Later Pt's HR down to 110s. No new orders. Will continue to monitor.

## 2022-09-30 NOTE — HPI
"From H&P: " This is a 61 year old male with a PMHx of SCC of the lung with metastasis to the bone (on maintenance gemcitabine and radiation, s/p chemoradiation and immunotherapy), COPD/bronchiestasis (on 2L O2), HTN, HFpEF (EF: 65%), CVA, CAD, PAD who presented for shortness of breath.      The patient developed worsening shortness of breath one day prior to presentation. The patient reports that his SOB became worse because he was in his yard and was bothered by the heat. His episode was associated with worsening hypoxia documented at home (SpO2: 88%, on 3L, with occasional drops to the high 70s). His wife called the oncology clinic and they were instructed to present to the ED for further management. The patient denies having worsening cough and reports feeling fine at the time of examination. He denied having any chest pain but did report having worsening lower extremity edema. He is compliant with his medications but no longer taking Lasix. No fevers or chills. No urinary or GI symptoms. While in the ER, the patient was saturating 92% on 6L of supplemental O2, but otherwise was hemodynamically stable. Labs showed no leukocytosis (11.1), normocytic anemia (12.5), elevated BNP (447), negative lactate and procalcitonin (0.15). CTA chest showed no evidence of acute pulmonary thromboembolism, and a decrease in right pleural fluid collection with stable consolidated changes in the right and left lungs. He was administered Duo-Nebs, aspirin 325 mg and ceftriaxone. He was admitted for further management. "    Palliative medicine consulted for advance care planning and continuity of care; Met with pt at bedside; for details of visit, see advance care planning section of plan.     "

## 2022-09-30 NOTE — ASSESSMENT & PLAN NOTE
Possible exacerbation given bronchiectasis, and lung cancer cause an increased disposition for infection.   - augmentin started at this time.  Recommend 5 days as long as he remains afebrile.  Given distorted architecture of lung, this will give some anaerobic coverage, if needed.    - continue nebulizer therapy q4hr PRN  - continue fluticasone furoate - vilanterol 100-25.  Continue home albuterol PRN.  - prednisone 40mg Qday x 5 days.  - sputum culture obtained.  Narrow/adjust abx based on results.

## 2022-09-30 NOTE — HPI
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      The patient developed worsening shortness of breath one day prior to presentation. The patient reports that his SOB became worse because he was in his yard and was bothered by the heat. His episode was associated with worsening hypoxia documented at home (SpO2: 88%, on 3L, with occasional drops to the high 70s). His wife called the oncology clinic and they were instructed to present to the ED for further management. The patient denies having worsening cough and reports feeling fine at the time of examination. He denied having any chest pain but did report having worsening lower extremity edema. He is compliant with his medications but no longer taking Lasix. No fevers or chills. No urinary or GI symptoms. While in the ER, the patient was saturating 92% on 6L of supplemental O2, but otherwise was hemodynamically stable. Labs showed no leukocytosis (11.1), normocytic anemia (12.5), elevated BNP (447), negative lactate and procalcitonin (0.15). CTA chest showed no evidence of acute pulmonary thromboembolism, and a decrease in right pleural fluid collection with stable consolidated changes in the right and left lungs. He was administered Duo-Nebs, aspirin 325 mg and ceftriaxone. He was admitted for further management.         Overview/Hospital Course:  Patient was admitted to the hospital for SOB- due to possible CHF/COPD/progression of lung CA and pulmonary hypertension.  Started on treatment.  Pulmonary consulted. Palliative care was consulted as well.       Consult for A-fib RVR - HR up to 160s - now 100-120 after IV metoprolol  Denies CP, still SOB  EKG sinus tachycardia with PACs - A-fib on telemetry  A-fib is new Dx    Followed by   Jessie    3/4/22 [facility] - EDP 15, LAD 20% proximal 30% mid at D1, Cx/RCA with luminal irregularities    Echo 5/18/22  The left ventricle is normal in size with concentric hypertrophy and normal systolic function.  The estimated ejection fraction is 65%.  Indeterminate left ventricular diastolic function.  Mild right ventricular enlargement with normal right ventricular systolic function.  Mild left atrial enlargement.  Mild right atrial enlargement.  Mild tricuspid regurgitation.  Normal central venous pressure (3 mmHg).  The estimated PA systolic pressure is 53 mmHg.  There is pulmonary hypertension.    Nonischemic cardiomyopathy:  EF 35-40%.  Catheterization showed nonobstructive CAD.  Currently euvolemic.  Continue long-acting beta blockers and losartan.     Hypertension:  Now well controlled     Nonobstructive CAD:  Aggressive risk factor modification     Abnormal ABIs.  Check peripheral ultrasound.  Peripheral ultrasound revealed greater than 50% left common iliac stenosis, left SFA disease, chronic total occlusion of right SFA.on pletal.  Currently denies any lifestyle limiting claudication.  Denies any ulcers on his feet.Consider revascularization if patient continues to lifestyle limiting claudication despite maximum medical management and walking therapy.     Right resting LUIS FELIPE 0.29, is suggestive of severe right lower extremity arterial disease sufficient to cause rest pain.  Left resting LUIS FELIPE 0.92, is suggestive of minimal left lower extremity arterial disease.  PVR waveforms are abnormal at the calf and ankle level on the right.

## 2022-09-30 NOTE — NURSING
RAPID RESPONSE NURSE PROACTIVE ROUNDING NOTE       Time of Visit: 1209    Admit Date: 2022  LOS: 2  Code Status: Full Code   Date of Visit: 2022  : 1960  Age: 61 y.o.  Sex: male  Race: White  Bed: W430/W430 A:   MRN: [id number]  Was the patient discharged from an ICU this admission? No   Was the patient discharged from a PACU within last 24 hours? No   Did the patient receive conscious sedation/general anesthesia in last 24 hours? No   Was the patient in the ED within the past 24 hours? No   Was the patient on NIPPV within the past 24 hours? No   Attending Physician: Carroll Ambriz MD  Primary Service: Hospitalist   Time spent at the bedside: 30 - 45 min    SITUATION    Notified by Epic patient alert  Reason for alert: MEWS 5    Diagnosis: Acute on chronic respiratory failure with hypoxia   has a past medical history of Combined systolic and diastolic heart failure, Hypertension, Lung cancer, and Lung mass.    Last Vitals:  Temp: 99 °F (37.2 °C) ( 1112)  Pulse: 100 ( 1205)  Resp: 21 ( 1150)  BP: 100/61 ( 1112)  SpO2: 95 % ( 1205)    24 Hour Vitals Range:  Temp:  [99 °F (37.2 °C)-100.1 °F (37.8 °C)]   Pulse:  []   Resp:  [18-22]   BP: (100-133)/(55-90)   SpO2:  [90 %-95 %]     Clinical Issues: Circulatory    ASSESSMENT/INTERVENTIONS    Pt found alert with NC in place.  Pt states he feels better post breathing tx; MD ordered lasix, EKG and IV lopressor.     RECOMMENDATIONS  See above    Discussed plan of care with charge RNNai    PROVIDER ESCALATION    Physician escalation: Yes    Orders received and case discussed with Dr. Ambriz .    Disposition:Remain in room 430    FOLLOW UP    Call back the Rapid Response NurseTena at [phone] for additional questions or concerns.

## 2022-09-30 NOTE — ASSESSMENT & PLAN NOTE
Appears somewhat compensated at this time, however apparently in a. Fib when seen today.  This is likely causing decompensation and subsequently worsening respiratory status.  - continue goal directed therapy and diuresis as needed.  - heart rate control imperative.  Cardiology on board.

## 2022-09-30 NOTE — SUBJECTIVE & OBJECTIVE
Past Medical History:   Diagnosis Date    Combined systolic and diastolic heart failure     Hypertension     Lung cancer     NSCLC    Lung mass     left lung     Past Surgical History:   Procedure Laterality Date    BRONCHOSCOPY N/A 2019    Procedure: Bronchoscopy;  Surgeon: Miguel Diagnostic Provider;  Location: [facility] OR [address] [place] [address];  Service: Anesthesiology;  Laterality: N/A;    CORONARY ANGIOGRAPHY N/A 3/4/2022    Procedure: ANGIOGRAM, CORONARY ARTERY;  Surgeon: Robson Green MD;  Location: [facility] CATH LAB;  Service: Cardiology;  Laterality: N/A;     Review of patient's allergies indicates:  No Known Allergies    Medications:  Continuous Infusions:  Scheduled Meds:   albuterol-ipratropium  3 mL Nebulization Q4H WAKE    amLODIPine  10 mg Oral Daily    amoxicillin-clavulanate 875-125mg  1 tablet Oral Q12H    aspirin  81 mg Oral Daily    atorvastatin  80 mg Oral Daily    carvediloL  6.25 mg Oral BID    cilostazoL  100 mg Oral BID    clopidogreL  75 mg Oral Daily    fluticasone furoate-vilanteroL  1 puff Inhalation Daily    furosemide (LASIX) injection  80 mg Intravenous Q12H    heparin (porcine)  5,000 Units Subcutaneous Q8H    losartan  100 mg Oral Daily    metoprolol succinate  50 mg Oral Daily    predniSONE  40 mg Oral Daily    vancomycin (VANCOCIN) IVPB  1,000 mg Intravenous Q12H     PRN Meds:acetaminophen, docusate sodium, glucagon (human recombinant), glucose, glucose, naloxone, sodium chloride 0.9%, Pharmacy to dose Vancomycin consult **AND** vancomycin - pharmacy to dose    Family History       Problem Relation (Age of Onset)    Cancer Father, Sister    Diabetes Mother    Heart defect Mother    No Known Problems Son          Tobacco Use    Smoking status: Former     Packs/day: 1.00     Years: 25.00     Pack years: 25.00     Types: Cigarettes     Quit date:      Years since quittin.7    Smokeless tobacco: Never   Substance and Sexual Activity    Alcohol use: Yes     Comment: ocassionally    Drug use:  Never    Sexual activity: Yes     Partners: Female     Review of Systems   Constitutional:  Positive for activity change and fatigue. Negative for chills and fever.        Reports feeling better overall today   HENT: Negative.     Respiratory:  Positive for cough and shortness of breath.    Cardiovascular:  Positive for leg swelling. Negative for chest pain.   Gastrointestinal: Negative.    Genitourinary: Negative.    Musculoskeletal: Negative.    Neurological: Negative.    Objective:     Vital Signs (Most Recent):  Temp: 99 °F (37.2 °C) (09/30/22 1112)  Pulse: 100 (09/30/22 1205)  Resp: (!) 21 (09/30/22 1150)  BP: 100/61 (09/30/22 1112)  SpO2: 95 % (09/30/22 1205)   Vital Signs (24h Range):  Temp:  [99 °F (37.2 °C)-100.1 °F (37.8 °C)] 99 °F (37.2 °C)  Pulse:  [] 100  Resp:  [18-22] 21  SpO2:  [90 %-95 %] 95 %  BP: (100-133)/(55-90) 100/61     Weight: 89.1 kg (196 lb 6.9 oz)  Body mass index is 28.18 kg/m².    Physical Exam  Vitals and nursing note reviewed.   Constitutional:       General: He is not in acute distress.     Appearance: He is normal weight. He is ill-appearing.   HENT:      Head: Normocephalic and atraumatic.      Nose: Nose normal.      Mouth/Throat:      Mouth: Mucous membranes are moist.   Pulmonary:      Effort: Pulmonary effort is normal.      Comments: NC in place   Musculoskeletal:      Right lower leg: Edema present.      Left lower leg: Edema present.   Neurological:      Mental Status: He is alert and oriented to person, place, and time.   Psychiatric:         Mood and Affect: Mood normal.         Thought Content: Thought content normal.     Significant Labs: All pertinent labs within the past 24 hours have been reviewed.  CBC:   Recent Labs   Lab 09/29/22  0523   WBC 12.22   HGB 13.0*   HCT 38.4*   MCV 92          BMP:  Recent Labs   Lab 09/30/22  1033   MG 1.7       LFT:  Lab Results   Component Value Date    AST 13 09/28/2022    ALKPHOS 88 09/28/2022    BILITOT 1.1 (H)  09/28/2022     Albumin:   Albumin   Date Value Ref Range Status   09/28/2022 3.5 3.5 - 5.2 g/dL Final     Protein:   Total Protein   Date Value Ref Range Status   09/28/2022 6.4 6.0 - 8.4 g/dL Final     Lactic acid:   Lab Results   Component Value Date    LACTATE 1.9 09/28/2022    LACTATE 1.9 09/04/2022    LACTATE 1.9 09/04/2022       Significant Imaging: I have reviewed all pertinent imaging results/findings within the past 24 hours.

## 2022-09-30 NOTE — PLAN OF CARE
Problem: Adult Inpatient Plan of Care  Goal: Plan of Care Review  Outcome: Ongoing, Progressing  Flowsheets (Taken 9/30/2022 1744)  Plan of Care Reviewed With: patient  Goal: Patient-Specific Goal (Individualized)  Outcome: Ongoing, Progressing  Goal: Absence of Hospital-Acquired Illness or Injury  Outcome: Ongoing, Progressing  Intervention: Identify and Manage Fall Risk  Flowsheets (Taken 9/30/2022 1744)  Safety Promotion/Fall Prevention:   Fall Risk reviewed with patient/family   high risk medications identified   nonskid shoes/socks when out of bed   side rails raised x 2   room near unit station   instructed to call staff for mobility   assistive device/personal item within reach   bed alarm set   medications reviewed  Intervention: Prevent Skin Injury  Flowsheets (Taken 9/30/2022 1744)  Body Position:   position changed independently   weight shifting  Skin Protection:   adhesive use limited   tubing/devices free from skin contact  Intervention: Prevent and Manage VTE (Venous Thromboembolism) Risk  Flowsheets (Taken 9/30/2022 1744)  Activity Management:   Ankle pumps - L1   Arm raise - L1   Rolling - L1   Straight leg raise - L1  VTE Prevention/Management:   ambulation promoted   bleeding precations maintained   bleeding risk assessed  Range of Motion: active ROM (range of motion) encouraged  Intervention: Prevent Infection  Flowsheets (Taken 9/30/2022 1744)  Infection Prevention: hand hygiene promoted  Goal: Optimal Comfort and Wellbeing  Outcome: Ongoing, Progressing  Goal: Readiness for Transition of Care  Outcome: Ongoing, Progressing     Problem: Infection  Goal: Absence of Infection Signs and Symptoms  Outcome: Ongoing, Progressing  Intervention: Prevent or Manage Infection  Flowsheets (Taken 9/30/2022 1744)  Infection Management: aseptic technique maintained     Problem: Coping Ineffective  Goal: Effective Coping  Outcome: Ongoing, Progressing  Intervention: Support and Enhance Coping  Strategies  Flowsheets (Taken 9/30/2022 4415)  Family/Support System Care: self-care encouraged

## 2022-09-30 NOTE — ASSESSMENT & PLAN NOTE
Patient with Hypoxic Respiratory failure which is Acute on chronic.  he is on home oxygen at 2 LPM. Supplemental oxygen was provided and noted- Oxygen Concentration (%):  [40] 40.   Signs/symptoms of respiratory failure include- increased work of breathing and respiratory distress. Contributing diagnoses includes - CHF and COPD Labs and images were reviewed. Patient Has not had a recent ABG. Will treat underlying causes and adjust management of respiratory failure as follows- See plan for heart failure/COPD     Multifactorial including possible diastolic dysfunction/COPD/Pulmoanry hypertension as well as possible progression of lung CA. Still with high 02 requirements. Will consult pulmonary and palliative care

## 2022-09-30 NOTE — PROGRESS NOTES
Pharmacokinetic Assessment Follow Up: IV Vancomycin    Vancomycin serum concentration assessment(s):    The trough level was drawn correctly and can be used to guide therapy at this time. The measurement is above the desired definitive target range of 10 to 20 mcg/mL.    Vancomycin Regimen Plan:    Change regimen to Vancomycin 1000 mg IV every 12 hours with next serum trough concentration measured at 2200 prior to 3rd dose on 10/1/2022    Drug levels (last 3 results):  Recent Labs   Lab Result Units 09/30/22  1033   Vancomycin-Trough ug/mL 22.8*       Pharmacy will continue to follow and monitor vancomycin.    Please contact pharmacy at extension [phone] for questions regarding this assessment.    Thank you for the consult,   Kingston Walker Jr       Patient brief summary:  Kashif Iverson is a 61 y.o. male initiated on antimicrobial therapy with IV Vancomycin for treatment of skin & soft tissue infection    Drug Allergies:   Review of patient's allergies indicates:  No Known Allergies    Actual Body Weight:   89.1 kg    Renal Function:   Estimated Creatinine Clearance: 96.8 mL/min (based on SCr of 0.9 mg/dL).,     Dialysis Method (if applicable):  N/A    CBC (last 72 hours):  Recent Labs   Lab Result Units 09/28/22  1616 09/29/22  0523   WBC K/uL 11.11 12.22   Hemoglobin g/dL 12.5* 13.0*   Hematocrit % 37.7* 38.4*   Platelets K/uL 193 151   Gran % % 88.8* 97.4*   Lymph % % 5.0* 1.3*   Mono % % 4.6 0.7*   Eosinophil % % 0.9 0.0   Basophil % % 0.3 0.1   Differential Method  Automated Automated       Metabolic Panel (last 72 hours):  Recent Labs   Lab Result Units 09/28/22  1616 09/29/22  0523 09/30/22  1033   Sodium mmol/L 139 134*  --    Potassium mmol/L 4.2 4.2  --    Chloride mmol/L 105 103  --    CO2 mmol/L 26 23  --    Glucose mg/dL 170* 159*  --    BUN mg/dL 16 15  --    Creatinine mg/dL 1.0 0.9  --    Albumin g/dL 3.5  --   --    Total Bilirubin mg/dL 1.1*  --   --    Alkaline Phosphatase U/L 88  --   --    AST  U/L 13  --   --    ALT U/L 10  --   --    Magnesium mg/dL  --  1.5* 1.7   Phosphorus mg/dL  --  2.8  --        Vancomycin Administrations:  vancomycin given in the last 96 hours                     vancomycin 1.25 g in dextrose 5% 250 mL IVPB (ready to mix) (mg) 1,250 mg New Bag 09/30/22 1118      Restarted 09/29/22 2309     1,250 mg New Bag  2243     1,250 mg New Bag  1133    vancomycin (VANCOCIN) 1,750 mg in dextrose 5 % 500 mL IVPB (mg) 1,750 mg New Bag 09/28/22 2310                    Microbiologic Results:  Microbiology Results (last 7 days)       Procedure Component Value Units Date/Time    Culture, Respiratory with Gram Stain [[id number]] Collected: 09/30/22 1119    Order Status: Sent Specimen: Respiratory from Sputum, Expectorated Updated: 09/30/22 1127    Blood Culture #1 **CANNOT BE ORDERED STAT** [[id number]] Collected: 09/28/22 1641    Order Status: Completed Specimen: Blood from Peripheral, Hand, Left Updated: 09/29/22 1903     Blood Culture, Routine No Growth to date      No Growth to date    Blood Culture #2 **CANNOT BE ORDERED STAT** [[id number]] Collected: 09/28/22 1641    Order Status: Completed Specimen: Blood from Peripheral, Antecubital, Right Updated: 09/29/22 1903     Blood Culture, Routine No Growth to date      No Growth to date

## 2022-09-30 NOTE — SUBJECTIVE & OBJECTIVE
Interval History:  No new issues     Review of Systems   Constitutional:  Negative for activity change.   HENT:  Negative for congestion and dental problem.    Eyes:  Negative for discharge and itching.   Respiratory:  Negative for chest tightness and shortness of breath.    Cardiovascular:  Negative for chest pain.   Gastrointestinal:  Negative for abdominal pain.   Genitourinary:  Negative for difficulty urinating and dysuria.   Musculoskeletal:  Negative for arthralgias.   Neurological:  Negative for dizziness and facial asymmetry.   Psychiatric/Behavioral:  Negative for agitation and behavioral problems.    Objective:     Vital Signs (Most Recent):  Temp: 99.2 °F (37.3 °C) (09/30/22 0459)  Pulse: (!) 56 (09/30/22 0812)  Resp: 19 (09/30/22 0812)  BP: 129/75 (09/30/22 0459)  SpO2: (!) 92 % (09/30/22 0812)   Vital Signs (24h Range):  Temp:  [99 °F (37.2 °C)-100.1 °F (37.8 °C)] 99.2 °F (37.3 °C)  Pulse:  [] 56  Resp:  [18-20] 19  SpO2:  [90 %-94 %] 92 %  BP: (110-133)/(55-90) 129/75     Weight: 89.1 kg (196 lb 6.9 oz)  Body mass index is 28.18 kg/m².    Intake/Output Summary (Last 24 hours) at 9/30/2022 1002  Last data filed at 9/30/2022 0629  Gross per 24 hour   Intake 936.99 ml   Output 2050 ml   Net -1113.01 ml      Physical Exam  Vitals and nursing note reviewed.   Constitutional:       General: He is not in acute distress.     Appearance: Normal appearance. He is normal weight. He is not ill-appearing, toxic-appearing or diaphoretic.   Eyes:      Conjunctiva/sclera: Conjunctivae normal.   Cardiovascular:      Rate and Rhythm: Normal rate and regular rhythm.      Heart sounds:     No gallop.   Pulmonary:      Effort: Pulmonary effort is normal. No respiratory distress.   Skin:     General: Skin is warm and dry.   Neurological:      Mental Status: He is alert and oriented to person, place, and time.   Psychiatric:         Mood and Affect: Mood normal.         Behavior: Behavior normal.         Thought  Content: Thought content normal.       Significant Labs: All pertinent labs within the past 24 hours have been reviewed.  BMP:   Recent Labs   Lab 09/29/22  0523   *   *   K 4.2      CO2 23   BUN 15   CREATININE 0.9   CALCIUM 8.7   MG 1.5*     CBC:   Recent Labs   Lab 09/28/22  1616 09/29/22  0523   WBC 11.11 12.22   HGB 12.5* 13.0*   HCT 37.7* 38.4*    151       Significant Imaging:

## 2022-09-30 NOTE — CONSULTS
[facility]  Palliative Medicine  Consult Note    Patient Name: Kashif Iverson  MRN: [id number]  Admission Date: 9/28/2022  Hospital Length of Stay: 2 days  Code Status: Full Code   Attending Provider: Carroll Ambriz MD  Consulting Provider: Janet Hawkins NP  Primary Care Physician: Guerline Higgins MD  Principal Problem:Acute on chronic respiratory failure with hypoxia    Patient information was obtained from patient, past medical records, ER records and primary team.      Inpatient consult to Palliative Care  Consult performed by: Janet Hawkins NP  Consult ordered by: Carroll Ambriz MD  Reason for consult: continuity of care, goals of care, and advanced care planning       Assessment/Plan:   Advance Care Planning   Palliative Consult   Date: 09/30/2022  - consult received   - interval chart reviewed in detail; pt known to palliative medicine team from previous admissions; previous ACP records from 2/2022 and 5/2022 reviewed (pt required intubation on both admits) discussed pt [facility] Dr. Ambriz (primary[facility]   - met with patient, at bedside; re-introduction to palliative medicine team and role in current care and admission   - learned more about pt outside of hospital and since previous admit; has been on home O2 at 2L; has been undergoing maintenance chemo and XRT without any reported difficulties and wishes to continue with oncology treatments as long as he remains a candidate; reports not taking lasix at home to his knowledge but is unsure as wife usually manages medications  - pt seems to have poor insight into chronic medical conditions and medications, which is consistent with previous palliative team assessments; COPD, CHF, and trajectory of both discussed and education provided to patient   - pt confirms continued desire for full code status; discusses previous admissions requiring intubation, and pt confirms if respiratory status were to require intubation in  "the future he would wish to be intubated, "I want to live"; confirmed wife, Natty, is legal MPOA and still who pt wishes to make medical decisions  if he lacked capacity in the future   - emotional support provided as pt seems very down about this admission "really thought I was doing better"; have asked nursing staff to assess for continued depressed mood over weekend, will plan to discuss symptoms further at next visit; pt confirms he has been visited by spiritual care team which "lifted his spirits"  - Allowed time for questions/concerns; all addressed; expressed availability of myself/palliative team for additional questions/concerns   - updated primary and     Acute on chronic respiratory failure with hypoxia  COPD (chronic obstructive pulmonary disease)  - PCCM consulted/following  - pt with history of resp failure requiring intubation during previous admits 2/22 and 5/22  - Hx of advanced lung cancer with bone mets and COPD requiring O2 at home    Lung cancer, main bronchus, left   - Pt of Dr. Hathaway (Oncology); OP maintenance chemotherapy and radiation, pt reports no problems or adverse reactions to current tx   - pt wishes to continue oncology treatment as long as he is a candidate  - history noted; management per primary and oncology      Chronic combined systolic and diastolic heart failure  - Echo 2/2022: EF: 35%-40%, mild TR; 5/2022: EF: 65%  - presented with worsening SOB and AMADEO   - pt reported no longer taking lasix, possible exacerbation     Coronary artery disease involving native coronary artery of native heart without angina pectoris  PAD (peripheral artery disease)  - history noted; management per primary     Left leg cellulitis  - noted; management per primary     Lung cancer, main bronchus, left  - Pt of Dr. Hathaway (Oncology); OP maintenance chemotherapy and radiation      Thank you for your consult. I will follow-up with patient. Please contact us if you have any additional questions.    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      The patient developed worsening shortness of breath one day prior to presentation. The patient reports that his SOB became worse because he was in his yard and was bothered by the heat. His episode was associated with worsening hypoxia documented at home (SpO2: 88%, on 3L, with occasional drops to the high 70s). His wife called the oncology clinic and they were instructed to present to the ED for further management. The patient denies having worsening cough and reports feeling fine at the time of examination. He denied having any chest pain but did report having worsening lower extremity edema. He is compliant with his medications but no longer taking Lasix. No fevers or chills. No urinary or GI symptoms. While in the ER, the patient was saturating 92% on 6L of supplemental O2, but otherwise was hemodynamically stable. Labs showed no leukocytosis (11.1), normocytic anemia (12.5), elevated BNP (447), negative lactate and procalcitonin (0.15). CTA chest showed no evidence of acute pulmonary thromboembolism, and a decrease in right pleural fluid collection with stable consolidated changes in the right and left lungs. He was administered Duo-Nebs, aspirin 325 mg and ceftriaxone. He was admitted for further management. "    Palliative medicine consulted for advance care planning and continuity of care; Met with pt at bedside; for details of visit, see advance care planning section of plan.       Hospital Course:  No notes on file    Past Medical History:   Diagnosis Date    Combined systolic and diastolic heart failure     Hypertension     Lung cancer     NSCLC    Lung mass     left lung     Past Surgical History: "   Procedure Laterality Date    BRONCHOSCOPY N/A 2019    Procedure: Bronchoscopy;  Surgeon: Miguel Diagnostic Provider;  Location: [facility] OR [address] [place] [address];  Service: Anesthesiology;  Laterality: N/A;    CORONARY ANGIOGRAPHY N/A 3/4/2022    Procedure: ANGIOGRAM, CORONARY ARTERY;  Surgeon: Robson Green MD;  Location: [facility] CATH LAB;  Service: Cardiology;  Laterality: N/A;     Review of patient's allergies indicates:  No Known Allergies    Medications:  Continuous Infusions:  Scheduled Meds:   albuterol-ipratropium  3 mL Nebulization Q4H WAKE    amLODIPine  10 mg Oral Daily    amoxicillin-clavulanate 875-125mg  1 tablet Oral Q12H    aspirin  81 mg Oral Daily    atorvastatin  80 mg Oral Daily    carvediloL  6.25 mg Oral BID    cilostazoL  100 mg Oral BID    clopidogreL  75 mg Oral Daily    fluticasone furoate-vilanteroL  1 puff Inhalation Daily    furosemide (LASIX) injection  80 mg Intravenous Q12H    heparin (porcine)  5,000 Units Subcutaneous Q8H    losartan  100 mg Oral Daily    metoprolol succinate  50 mg Oral Daily    predniSONE  40 mg Oral Daily    vancomycin (VANCOCIN) IVPB  1,000 mg Intravenous Q12H     PRN Meds:acetaminophen, docusate sodium, glucagon (human recombinant), glucose, glucose, naloxone, sodium chloride 0.9%, Pharmacy to dose Vancomycin consult **AND** vancomycin - pharmacy to dose    Family History       Problem Relation (Age of Onset)    Cancer Father, Sister    Diabetes Mother    Heart defect Mother    No Known Problems Son          Tobacco Use    Smoking status: Former     Packs/day: 1.00     Years: 25.00     Pack years: 25.00     Types: Cigarettes     Quit date:      Years since quittin.7    Smokeless tobacco: Never   Substance and Sexual Activity    Alcohol use: Yes     Comment: ocassionally    Drug use: Never    Sexual activity: Yes     Partners: Female     Review of Systems   Constitutional:  Positive for activity change and fatigue. Negative for chills and fever.   HENT: Negative.      Respiratory:  Positive for cough and shortness of breath.    Cardiovascular:  Positive for leg swelling. Negative for chest pain.   Gastrointestinal: Negative.    Genitourinary: Negative.    Musculoskeletal: Negative.    Neurological: Negative.    Objective:     Vital Signs (Most Recent):  Temp: 99 °F (37.2 °C) (09/30/22 1112)  Pulse: 100 (09/30/22 1205)  Resp: (!) 21 (09/30/22 1150)  BP: 100/61 (09/30/22 1112)  SpO2: 95 % (09/30/22 1205)   Vital Signs (24h Range):  Temp:  [99 °F (37.2 °C)-100.1 °F (37.8 °C)] 99 °F (37.2 °C)  Pulse:  [] 100  Resp:  [18-22] 21  SpO2:  [90 %-95 %] 95 %  BP: (100-133)/(55-90) 100/61     Weight: 89.1 kg (196 lb 6.9 oz)  Body mass index is 28.18 kg/m².    Physical Exam  Vitals and nursing note reviewed.   Constitutional:       General: He is not in acute distress.     Appearance: He is normal weight. He is ill-appearing.   HENT:      Head: Normocephalic and atraumatic.      Nose: Nose normal.      Mouth/Throat:      Mouth: Mucous membranes are moist.   Pulmonary:      Effort: Pulmonary effort is normal.      Comments: NC in place   Musculoskeletal:      Right lower leg: Edema present.      Left lower leg: Edema present.   Neurological:      Mental Status: He is alert and oriented to person, place, and time.   Psychiatric:         Mood and Affect: Mood normal.         Thought Content: Thought content normal.     Significant Labs: All pertinent labs within the past 24 hours have been reviewed.  CBC:   Recent Labs   Lab 09/29/22  0523   WBC 12.22   HGB 13.0*   HCT 38.4*   MCV 92          BMP:  Recent Labs   Lab 09/30/22  1033   MG 1.7       LFT:  Lab Results   Component Value Date    AST 13 09/28/2022    ALKPHOS 88 09/28/2022    BILITOT 1.1 (H) 09/28/2022     Albumin:   Albumin   Date Value Ref Range Status   09/28/2022 3.5 3.5 - 5.2 g/dL Final     Protein:   Total Protein   Date Value Ref Range Status   09/28/2022 6.4 6.0 - 8.4 g/dL Final     Lactic acid:   Lab Results    Component Value Date    LACTATE 1.9 09/28/2022    LACTATE 1.9 09/04/2022    LACTATE 1.9 09/04/2022       Significant Imaging: I have reviewed all pertinent imaging results/findings within the past 24 hours.      Total visit time: 70 minutes    > 50% of  70  min visit spent in chart review, face to face discussion of symptom assessment, coordination of care with other specialists, and discharge planning.    ACP time spent included in visit time: goals of care, emotional support, formulating and communicating prognosis, exploring burden/ benefit of various approaches of treatment.     Janet Hawkins NP  Palliative Medicine  [facility]

## 2022-09-30 NOTE — ASSESSMENT & PLAN NOTE
New Dx. Rates improved after metoprolol. Repeat echo. Switch to DOAC prior to discharge. Consider out patient CV if A-fib persists

## 2022-09-30 NOTE — SUBJECTIVE & OBJECTIVE
Past Medical History:   Diagnosis Date    Combined systolic and diastolic heart failure     Hypertension     Lung cancer     NSCLC    Lung mass     left lung     Past Surgical History:   Procedure Laterality Date    BRONCHOSCOPY N/A 2019    Procedure: Bronchoscopy;  Surgeon: Miguel Diagnostic Provider;  Location: [facility] OR [address] [place] [address];  Service: Anesthesiology;  Laterality: N/A;    CORONARY ANGIOGRAPHY N/A 3/4/2022    Procedure: ANGIOGRAM, CORONARY ARTERY;  Surgeon: Robson Green MD;  Location: [facility] CATH LAB;  Service: Cardiology;  Laterality: N/A;     Review of patient's allergies indicates:  No Known Allergies    Medications:  Continuous Infusions:  Scheduled Meds:   albuterol-ipratropium  3 mL Nebulization Q4H WAKE    amLODIPine  10 mg Oral Daily    amoxicillin-clavulanate 875-125mg  1 tablet Oral Q12H    aspirin  81 mg Oral Daily    atorvastatin  80 mg Oral Daily    carvediloL  6.25 mg Oral BID    cilostazoL  100 mg Oral BID    clopidogreL  75 mg Oral Daily    fluticasone furoate-vilanteroL  1 puff Inhalation Daily    furosemide (LASIX) injection  80 mg Intravenous Q12H    heparin (porcine)  5,000 Units Subcutaneous Q8H    losartan  100 mg Oral Daily    predniSONE  40 mg Oral Daily    vancomycin (VANCOCIN) IVPB  1,250 mg Intravenous Q12H     PRN Meds:acetaminophen, docusate sodium, glucagon (human recombinant), glucose, glucose, naloxone, sodium chloride 0.9%, Pharmacy to dose Vancomycin consult **AND** vancomycin - pharmacy to dose    Family History       Problem Relation (Age of Onset)    Cancer Father, Sister    Diabetes Mother    Heart defect Mother    No Known Problems Son          Tobacco Use    Smoking status: Former     Packs/day: 1.00     Years: 25.00     Pack years: 25.00     Types: Cigarettes     Quit date:      Years since quittin.7    Smokeless tobacco: Never   Substance and Sexual Activity    Alcohol use: Yes     Comment: ocassionally    Drug use: Never    Sexual activity: Yes     Partners:  Female     Review of Systems   Constitutional:  Positive for activity change and fatigue. Negative for chills and fever.   HENT: Negative.     Respiratory:  Positive for cough and shortness of breath.    Cardiovascular:  Positive for leg swelling. Negative for chest pain.   Gastrointestinal: Negative.    Genitourinary: Negative.    Musculoskeletal: Negative.    Neurological: Negative.    Objective:     Vital Signs (Most Recent):  Temp: 99 °F (37.2 °C) (09/30/22 1112)  Pulse: 110 (09/30/22 1150)  Resp: (!) 21 (09/30/22 1150)  BP: 100/61 (09/30/22 1112)  SpO2: (!) 94 % (09/30/22 1150)   Vital Signs (24h Range):  Temp:  [99 °F (37.2 °C)-100.1 °F (37.8 °C)] 99 °F (37.2 °C)  Pulse:  [] 110  Resp:  [18-22] 21  SpO2:  [90 %-94 %] 94 %  BP: (100-133)/(55-90) 100/61     Weight: 89.1 kg (196 lb 6.9 oz)  Body mass index is 28.18 kg/m².    Physical Exam  Vitals and nursing note reviewed.   Constitutional:       General: He is not in acute distress.     Appearance: He is normal weight. He is ill-appearing.   HENT:      Head: Normocephalic and atraumatic.      Nose: Nose normal.      Mouth/Throat:      Mouth: Mucous membranes are moist.   Pulmonary:      Effort: Pulmonary effort is normal.      Comments: NC in place   Musculoskeletal:      Right lower leg: Edema present.      Left lower leg: Edema present.   Neurological:      Mental Status: He is alert and oriented to person, place, and time.   Psychiatric:         Mood and Affect: Mood normal.         Thought Content: Thought content normal.     Significant Labs: All pertinent labs within the past 24 hours have been reviewed.  CBC:   Recent Labs   Lab 09/29/22  0523   WBC 12.22   HGB 13.0*   HCT 38.4*   MCV 92        BMP:  Recent Labs   Lab 09/30/22  1033   MG 1.7     LFT:  Lab Results   Component Value Date    AST 13 09/28/2022    ALKPHOS 88 09/28/2022    BILITOT 1.1 (H) 09/28/2022     Albumin:   Albumin   Date Value Ref Range Status   09/28/2022 3.5 3.5 - 5.2  g/dL Final     Protein:   Total Protein   Date Value Ref Range Status   09/28/2022 6.4 6.0 - 8.4 g/dL Final     Lactic acid:   Lab Results   Component Value Date    LACTATE 1.9 09/28/2022    LACTATE 1.9 09/04/2022    LACTATE 1.9 09/04/2022       Significant Imaging: I have reviewed all pertinent imaging results/findings within the past 24 hours.

## 2022-09-30 NOTE — CONSULTS
[facility] Surg  Cardiology  Consult Note    Patient Name: Kashif Iverson  MRN: [id number]  Admission Date: 9/28/2022  Hospital Length of Stay: 2 days  Code Status: Full Code   Attending Provider: Carroll Ambriz MD   Consulting Provider: Robson Green MD  Primary Care Physician: Guerline Higgins MD  Principal Problem:Acute on chronic respiratory failure with hypoxia    Patient information was obtained from patient and ER records.     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      The patient developed worsening shortness of breath one day prior to presentation. The patient reports that his SOB became worse because he was in his yard and was bothered by the heat. His episode was associated with worsening hypoxia documented at home (SpO2: 88%, on 3L, with occasional drops to the high 70s). His wife called the oncology clinic and they were instructed to present to the ED for further management. The patient denies having worsening cough and reports feeling fine at the time of examination. He denied having any chest pain but did report having worsening lower extremity edema. He is compliant with his medications but no longer taking Lasix. No fevers or chills. No urinary or GI symptoms. While in the ER, the patient was saturating 92% on 6L of supplemental O2, but otherwise was hemodynamically stable. Labs showed no leukocytosis (11.1), normocytic anemia (12.5), elevated BNP (447), negative lactate and procalcitonin (0.15). CTA chest showed no evidence of acute pulmonary thromboembolism, and a decrease in right pleural fluid collection with stable consolidated changes in the right and left lungs. He was administered  Duo-Nebs, aspirin 325 mg and ceftriaxone. He was admitted for further management.         Overview/Hospital Course:  Patient was admitted to the hospital for SOB- due to possible CHF/COPD/progression of lung CA and pulmonary hypertension.  Started on treatment.  Pulmonary consulted. Palliative care was consulted as well.       Consult for A-fib RVR - HR up to 160s - now 100-120 after IV metoprolol  Denies CP, still SOB  EKG sinus tachycardia with PACs - A-fib on telemetry  A-fib is new Dx    Followed by Dr Roman    3/4/22 [facility] - EDP 15, LAD 20% proximal 30% mid at D1, Cx/RCA with luminal irregularities    Echo 5/18/22   The left ventricle is normal in size with concentric hypertrophy and normal systolic function.   The estimated ejection fraction is 65%.   Indeterminate left ventricular diastolic function.   Mild right ventricular enlargement with normal right ventricular systolic function.   Mild left atrial enlargement.   Mild right atrial enlargement.   Mild tricuspid regurgitation.   Normal central venous pressure (3 mmHg).   The estimated PA systolic pressure is 53 mmHg.   There is pulmonary hypertension.    Nonischemic cardiomyopathy:  EF 35-40%.  Catheterization showed nonobstructive CAD.  Currently euvolemic.  Continue long-acting beta blockers and losartan.     Hypertension:  Now well controlled     Nonobstructive CAD:  Aggressive risk factor modification     Abnormal ABIs.  Check peripheral ultrasound.  Peripheral ultrasound revealed greater than 50% left common iliac stenosis, left SFA disease, chronic total occlusion of right SFA.on pletal.  Currently denies any lifestyle limiting claudication.  Denies any ulcers on his feet.Consider revascularization if patient continues to lifestyle limiting claudication despite maximum medical management and walking therapy.      Right resting LUIS FELIPE 0.29, is suggestive of severe right lower extremity arterial disease sufficient to cause rest pain.   Left  resting LUIS FELIPE 0.92, is suggestive of minimal left lower extremity arterial disease.   PVR waveforms are abnormal at the calf and ankle level on the right.               Past Medical History:   Diagnosis Date    Combined systolic and diastolic heart failure     Hypertension     Lung cancer     NSCLC    Lung mass     left lung       Past Surgical History:   Procedure Laterality Date    BRONCHOSCOPY N/A 8/30/2019    Procedure: Bronchoscopy;  Surgeon: Miguel Diagnostic Provider;  Location: [facility] OR [address] [place] [address];  Service: Anesthesiology;  Laterality: N/A;    CORONARY ANGIOGRAPHY N/A 3/4/2022    Procedure: ANGIOGRAM, CORONARY ARTERY;  Surgeon: Robson Green MD;  Location: [facility] CATH LAB;  Service: Cardiology;  Laterality: N/A;       Review of patient's allergies indicates:  No Known Allergies    No current facility-administered medications on file prior to encounter.     Current Outpatient Medications on File Prior to Encounter   Medication Sig    albuterol (VENTOLIN HFA) 90 mcg/actuation inhaler Inhale 2 puffs into the lungs every 6 (six) hours as needed for Wheezing. Rescue    amLODIPine (NORVASC) 10 MG tablet Take 1 tablet (10 mg total) by mouth once daily.    ascorbic acid-multivit-min (VITAMIN C ENERGY BOOSTER) 1,000 mg PwEP Take 3,000 mg by mouth once daily. Patient takes 3,000 mg per day    aspirin 81 MG Chew Take 1 tablet (81 mg total) by mouth once daily.    atorvastatin (LIPITOR) 80 MG tablet Take 1 tablet (80 mg total) by mouth once daily.    carvediloL (COREG) 6.25 MG tablet Take 1 tablet (6.25 mg total) by mouth 2 (two) times daily.    cilostazoL (PLETAL) 100 MG Tab Take 1 tablet (100 mg total) by mouth 2 (two) times daily.    clopidogreL (PLAVIX) 75 mg tablet Take 1 tablet (75 mg total) by mouth once daily.    fluticasone-umeclidin-vilanter (TRELEGY ELLIPTA) 100-62.5-25 mcg DsDv Inhale 1 puff into the lungs once daily.    losartan (COZAAR) 100 MG tablet Take 1 tablet (100 mg total) by mouth once  daily.    predniSONE (DELTASONE) 10 MG tablet Take 3 pills by mouth daily x 5 days, then take 2 pills by mouth daily x 5 days, then take one pill daily x 5 days    furosemide (LASIX) 40 MG tablet Take 1 tablet (40 mg total) by mouth 2 (two) times a day.     Family History       Problem Relation (Age of Onset)    Cancer Father, Sister    Diabetes Mother    Heart defect Mother    No Known Problems Son          Tobacco Use    Smoking status: Former     Packs/day: 1.00     Years: 25.00     Pack years: 25.00     Types: Cigarettes     Quit date:      Years since quittin.7    Smokeless tobacco: Never   Substance and Sexual Activity    Alcohol use: Yes     Comment: ocassionally    Drug use: Never    Sexual activity: Yes     Partners: Female     Review of Systems   Constitutional: Negative for decreased appetite.   HENT:  Negative for ear discharge.    Eyes:  Negative for blurred vision.   Endocrine: Negative for polyphagia.   Skin:  Negative for nail changes.   Genitourinary:  Negative for bladder incontinence.   Neurological:  Negative for aphonia.   Psychiatric/Behavioral:  Negative for hallucinations.    Allergic/Immunologic: Negative for hives.   Objective:     Vital Signs (Most Recent):  Temp: 99 °F (37.2 °C) (22 1112)  Pulse: (P) 92 (22 1503)  Resp: (!) 21 (22 1150)  BP: 100/61 (22 1112)  SpO2: 95 % (22 1205)   Vital Signs (24h Range):  Temp:  [99 °F (37.2 °C)-100.1 °F (37.8 °C)] 99 °F (37.2 °C)  Pulse:  [] (P) 92  Resp:  [18-22] 21  SpO2:  [90 %-95 %] 95 %  BP: (100-133)/(55-90) 100/61     Weight: 89.1 kg (196 lb 6.9 oz)  Body mass index is 28.18 kg/m².    SpO2: 95 %  O2 Device (Oxygen Therapy): High Flow nasal Cannula      Intake/Output Summary (Last 24 hours) at 2022 1505  Last data filed at 2022 0845  Gross per 24 hour   Intake 444.77 ml   Output 1400 ml   Net -955.23 ml       Lines/Drains/Airways       Central Venous Catheter Line  Duration              Port A Cath Single Lumen right subclavian -- days              Peripheral Intravenous Line  Duration                  Peripheral IV - Single Lumen 09/28/22 1541 20 G Left Antecubital 1 day                    Physical Exam  Constitutional:       Appearance: He is well-developed.   HENT:      Head: Normocephalic and atraumatic.   Eyes:      Conjunctiva/sclera: Conjunctivae normal.      Pupils: Pupils are equal, round, and reactive to light.   Cardiovascular:      Rate and Rhythm: Tachycardia present. Rhythm irregular.      Pulses: Intact distal pulses.      Heart sounds: Normal heart sounds.   Pulmonary:      Effort: Pulmonary effort is normal.      Breath sounds: Normal breath sounds.   Abdominal:      General: Bowel sounds are normal.      Palpations: Abdomen is soft.   Musculoskeletal:         General: Normal range of motion.      Cervical back: Normal range of motion and neck supple.   Skin:     General: Skin is warm and dry.   Neurological:      Mental Status: He is alert and oriented to person, place, and time.       Significant Labs: All pertinent lab results from the last 24 hours have been reviewed.    Significant Imaging: Echocardiogram: 2Transthoracic echo (TTE) complete (Cupid Only):   Results for orders placed or performed during the hospital encounter of 05/17/22   Echo   Result Value Ref Range    Ascending aorta 3.07 cm    STJ 3.09 cm    AV mean gradient 6 mmHg    Ao peak luigi 1.55 m/s    Ao VTI 30.07 cm    IVS 1.45 (A) 0.6 - 1.1 cm    LA size 3.87 cm    Left Atrium Major Axis 5.75 cm    Left Atrium Minor Axis 5.52 cm    LVIDd 4.48 3.5 - 6.0 cm    LVIDs 2.81 2.1 - 4.0 cm    LVOT diameter 2.36 cm    LVOT peak VTI 24.35 cm    Posterior Wall 1.38 (A) 0.6 - 1.1 cm    MV Peak A Luigi 0.97 m/s    E wave deceleration time 299.29 msec    MV Peak E Luigi 0.59 m/s    PV Peak D Luigi 0.55 m/s    PV Peak S Luigi 0.82 m/s    RA Major Axis 5.79 cm    RA Width 4.21 cm    RVDD 4.86 cm    TAPSE 2.21 cm    TR Max Luigi 3.55 m/s     LA WIDTH 4.16 cm    Ao root annulus 3.57 cm    AORTIC VALVE CUSP SEPERATION 2.08 cm    PV PEAK VELOCITY 1.19 cm/s    MV stenosis pressure 1/2 time 86.80 ms    LV Diastolic Volume 91.62 mL    LV Systolic Volume 29.93 mL    LVOT peak tere 1.16 m/s    TDI LATERAL 0.12 m/s    TDI SEPTAL 0.07 m/s    LV LATERAL E/E' RATIO 4.92 m/s    LV SEPTAL E/E' RATIO 8.43 m/s    FS 37 %    LA volume 77.08 cm3    LV mass 250.78 g    Left Ventricle Relative Wall Thickness 0.62 cm    AV valve area 3.54 cm2    AV Velocity Ratio 0.75     AV index (prosthetic) 0.81     MV valve area p 1/2 method 2.53 cm2    E/A ratio 0.61     Mean e' 0.10 m/s    Pulm vein S/D ratio 1.49     LVOT area 4.4 cm2    LVOT stroke volume 106.46 cm3    AV peak gradient 10 mmHg    E/E' ratio 6.21 m/s    LV Systolic Volume Index 14.4 mL/m2    LV Diastolic Volume Index 44.05 mL/m2    LA Volume Index 37.1 mL/m2    LV Mass Index 121 g/m2    Triscuspid Valve Regurgitation Peak Gradient 50 mmHg    BSA 2.17 m2    Sinus 3.20 cm    Right Atrial Pressure (from IVC) 3 mmHg    EF 65 %    TV rest pulmonary artery pressure 53 mmHg    Narrative    · The left ventricle is normal in size with concentric hypertrophy and   normal systolic function.  · The estimated ejection fraction is 65%.  · Indeterminate left ventricular diastolic function.  · Mild right ventricular enlargement with normal right ventricular   systolic function.  · Mild left atrial enlargement.  · Mild right atrial enlargement.  · Mild tricuspid regurgitation.  · Normal central venous pressure (3 mmHg).  · The estimated PA systolic pressure is 53 mmHg.  · There is pulmonary hypertension.        Assessment and Plan:     * Acute on chronic respiratory failure with hypoxia  Per primary    Persistent atrial fibrillation  New Dx. Rates improved after metoprolol. Repeat echo. Switch to DOAC prior to discharge. Consider out patient CV if A-fib persists    Coronary artery disease involving native coronary artery of native  heart without angina pectoris  Non-obstructive CAD by [facility] 3/4/22    PAD (peripheral artery disease)  Per Dr Roman    Lung cancer, main bronchus, left  Per primary        VTE Risk Mitigation (From admission, onward)         Ordered     heparin (porcine) injection 5,000 Units  Every 8 hours         09/28/22 2203     IP VTE HIGH RISK PATIENT  Once         09/28/22 2203     Place sequential compression device  Until discontinued         09/28/22 2203                Thank you for your consult. I will follow-up with patient. Please contact us if you have any additional questions.    Robson Green MD  Cardiology   [facility]

## 2022-09-30 NOTE — PROGRESS NOTES
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     The patient developed worsening shortness of breath one day prior to presentation. The patient reports that his SOB became worse because he was in his yard and was bothered by the heat. His episode was associated with worsening hypoxia documented at home (SpO2: 88%, on 3L, with occasional drops to the high 70s). His wife called the oncology clinic and they were instructed to present to the ED for further management. The patient denies having worsening cough and reports feeling fine at the time of examination. He denied having any chest pain but did report having worsening lower extremity edema. He is compliant with his medications but no longer taking Lasix. No fevers or chills. No urinary or GI symptoms. While in the ER, the patient was saturating 92% on 6L of supplemental O2, but otherwise was hemodynamically stable. Labs showed no leukocytosis (11.1), normocytic anemia (12.5), elevated BNP (447), negative lactate and procalcitonin (0.15). CTA chest showed no evidence of acute pulmonary thromboembolism, and a decrease in right pleural fluid collection with stable consolidated changes in the right and left lungs. He was administered Duo-Nebs, aspirin 325 mg and ceftriaxone. He was admitted for further management.       Overview/Hospital Course:  Patient was admitted to the  hospital for SOB- due to possible CHF/COPD/progression of lung CA and pulmonary hypertension.  Started on treatment.  Pulmonary consulted. Palliative care was consulted as well.       Interval History:  No new issues     Review of Systems   Constitutional:  Negative for activity change.   HENT:  Negative for congestion and dental problem.    Eyes:  Negative for discharge and itching.   Respiratory:  Negative for chest tightness and shortness of breath.    Cardiovascular:  Negative for chest pain.   Gastrointestinal:  Negative for abdominal pain.   Genitourinary:  Negative for difficulty urinating and dysuria.   Musculoskeletal:  Negative for arthralgias.   Neurological:  Negative for dizziness and facial asymmetry.   Psychiatric/Behavioral:  Negative for agitation and behavioral problems.    Objective:     Vital Signs (Most Recent):  Temp: 99.2 °F (37.3 °C) (09/30/22 0459)  Pulse: (!) 56 (09/30/22 0812)  Resp: 19 (09/30/22 0812)  BP: 129/75 (09/30/22 0459)  SpO2: (!) 92 % (09/30/22 0812)   Vital Signs (24h Range):  Temp:  [99 °F (37.2 °C)-100.1 °F (37.8 °C)] 99.2 °F (37.3 °C)  Pulse:  [] 56  Resp:  [18-20] 19  SpO2:  [90 %-94 %] 92 %  BP: (110-133)/(55-90) 129/75     Weight: 89.1 kg (196 lb 6.9 oz)  Body mass index is 28.18 kg/m².    Intake/Output Summary (Last 24 hours) at 9/30/2022 1002  Last data filed at 9/30/2022 0629  Gross per 24 hour   Intake 936.99 ml   Output 2050 ml   Net -1113.01 ml      Physical Exam  Vitals and nursing note reviewed.   Constitutional:       General: He is not in acute distress.     Appearance: Normal appearance. He is normal weight. He is not ill-appearing, toxic-appearing or diaphoretic.   Eyes:      Conjunctiva/sclera: Conjunctivae normal.   Cardiovascular:      Rate and Rhythm: Normal rate and regular rhythm.      Heart sounds:     No gallop.   Pulmonary:      Effort: Pulmonary effort is normal. No respiratory distress.   Skin:     General: Skin is warm and dry.    Neurological:      Mental Status: He is alert and oriented to person, place, and time.   Psychiatric:         Mood and Affect: Mood normal.         Behavior: Behavior normal.         Thought Content: Thought content normal.       Significant Labs: All pertinent labs within the past 24 hours have been reviewed.  BMP:   Recent Labs   Lab 09/29/22  0523   *   *   K 4.2      CO2 23   BUN 15   CREATININE 0.9   CALCIUM 8.7   MG 1.5*     CBC:   Recent Labs   Lab 09/28/22  1616 09/29/22  0523   WBC 11.11 12.22   HGB 12.5* 13.0*   HCT 37.7* 38.4*    151       Significant Imaging:       Assessment/Plan:      * Acute on chronic respiratory failure with hypoxia  Patient with Hypoxic Respiratory failure which is Acute on chronic.  he is on home oxygen at 2 LPM. Supplemental oxygen was provided and noted- Oxygen Concentration (%):  [40] 40.   Signs/symptoms of respiratory failure include- increased work of breathing and respiratory distress. Contributing diagnoses includes - CHF and COPD Labs and images were reviewed. Patient Has not had a recent ABG. Will treat underlying causes and adjust management of respiratory failure as follows- See plan for heart failure/COPD     Multifactorial including possible diastolic dysfunction/COPD/Pulmoanry hypertension as well as possible progression of lung CA. Still with high 02 requirements. Will consult pulmonary and palliative care     Left leg cellulitis  - Patietn reports having AMADEO (L>R)   - Erythema and warmth noted on physical examination  - Likely related to underlying cellulitis     Plan:   - Vancomycin, pharmacy to dose  - Monitor progression/resolution       COPD (chronic obstructive pulmonary disease)  - History of COPD on supplemental O2   - Presented with worsening SOB   - Likely related to a COPD flare (in the setting of recent weather changes)     Plan:   - Prednisone 40 mg x 5 days   - Continue home inhalers   - Duo-Nebs scheduled  - Does not meet  criteria for antibiotics     Coronary artery disease involving native coronary artery of native heart without angina pectoris  - Continue home medications (aspirin, Coreg, statin and Plavix)     PAD (peripheral artery disease)  - Resume home medications (aspirin, statin and cilostazol)   - Follow up outpatient with cardiology     Chronic combined systolic and diastolic heart failure  - Echocardiogram (2/22/2022): EF: 35%-40%, GIDD, normal RV systolic function, mild TR   - Echocardiogram (5/18/2022): EF: 65%, indeterminate LV diastolic function, mild TR, PA pressure of 54 mmHg  - Patient presented with worsening SOB and AMADEO   - Elevated BNP, compared to most recent readings   - Reports that he no longer takes his Lasix   - Suspect that he is in exacerbation     Plan:   - Lasix 40 mg IV BID   - Resume home medications     Increase lasix     Essential hypertension  - Resume home medications (amlodipine 10 mg, Coreg 6.25 mg BID, Losartan 100 mg)     Lung cancer, main bronchus, left  - On maintenance chemotherapy and radiation   - Outpatient follow up with oncology         VTE Risk Mitigation (From admission, onward)         Ordered     heparin (porcine) injection 5,000 Units  Every 8 hours         09/28/22 2203     IP VTE HIGH RISK PATIENT  Once         09/28/22 2203     Place sequential compression device  Until discontinued         09/28/22 2203                Discharge Planning   [place]:      Code Status: Full Code   Is the patient medically ready for discharge?:     Reason for patient still in hospital (select all that apply): Patient unstable  Discharge Plan A: Home Health                  Carroll Syed MD  Department of Hospital Medicine   [facility] [place] - [facility] Surg

## 2022-09-30 NOTE — NURSING
Patient left unit via bed with transporter for procedure.  Portable O2 tank with patient.  No acute distress noted.

## 2022-09-30 NOTE — SUBJECTIVE & OBJECTIVE
Past Medical History:   Diagnosis Date    Combined systolic and diastolic heart failure     Hypertension     Lung cancer     NSCLC    Lung mass     left lung       Past Surgical History:   Procedure Laterality Date    BRONCHOSCOPY N/A 8/30/2019    Procedure: Bronchoscopy;  Surgeon: [facility] Diagnostic Provider;  Location: [facility] OR [address] [place] [address];  Service: Anesthesiology;  Laterality: N/A;    CORONARY ANGIOGRAPHY N/A 3/4/2022    Procedure: ANGIOGRAM, CORONARY ARTERY;  Surgeon: Robson Green MD;  Location: [facility] CATH LAB;  Service: Cardiology;  Laterality: N/A;       Review of patient's allergies indicates:  No Known Allergies    No current facility-administered medications on file prior to encounter.     Current Outpatient Medications on File Prior to Encounter   Medication Sig    albuterol (VENTOLIN HFA) 90 mcg/actuation inhaler Inhale 2 puffs into the lungs every 6 (six) hours as needed for Wheezing. Rescue    amLODIPine (NORVASC) 10 MG tablet Take 1 tablet (10 mg total) by mouth once daily.    ascorbic acid-multivit-min (VITAMIN C ENERGY BOOSTER) 1,000 mg PwEP Take 3,000 mg by mouth once daily. Patient takes 3,000 mg per day    aspirin 81 MG Chew Take 1 tablet (81 mg total) by mouth once daily.    atorvastatin (LIPITOR) 80 MG tablet Take 1 tablet (80 mg total) by mouth once daily.    carvediloL (COREG) 6.25 MG tablet Take 1 tablet (6.25 mg total) by mouth 2 (two) times daily.    cilostazoL (PLETAL) 100 MG Tab Take 1 tablet (100 mg total) by mouth 2 (two) times daily.    clopidogreL (PLAVIX) 75 mg tablet Take 1 tablet (75 mg total) by mouth once daily.    fluticasone-umeclidin-vilanter (TRELEGY ELLIPTA) 100-62.5-25 mcg DsDv Inhale 1 puff into the lungs once daily.    losartan (COZAAR) 100 MG tablet Take 1 tablet (100 mg total) by mouth once daily.    predniSONE (DELTASONE) 10 MG tablet Take 3 pills by mouth daily x 5 days, then take 2 pills by mouth daily x 5 days, then take one pill daily x 5 days    furosemide (LASIX) 40  MG tablet Take 1 tablet (40 mg total) by mouth 2 (two) times a day.     Family History       Problem Relation (Age of Onset)    Cancer Father, Sister    Diabetes Mother    Heart defect Mother    No Known Problems Son          Tobacco Use    Smoking status: Former     Packs/day: 1.00     Years: 25.00     Pack years: 25.00     Types: Cigarettes     Quit date:      Years since quittin.7    Smokeless tobacco: Never   Substance and Sexual Activity    Alcohol use: Yes     Comment: ocassionally    Drug use: Never    Sexual activity: Yes     Partners: Female     Review of Systems   Constitutional: Negative for decreased appetite.   HENT:  Negative for ear discharge.    Eyes:  Negative for blurred vision.   Endocrine: Negative for polyphagia.   Skin:  Negative for nail changes.   Genitourinary:  Negative for bladder incontinence.   Neurological:  Negative for aphonia.   Psychiatric/Behavioral:  Negative for hallucinations.    Allergic/Immunologic: Negative for hives.   Objective:     Vital Signs (Most Recent):  Temp: 99 °F (37.2 °C) (22 1112)  Pulse: (P) 92 (22 1503)  Resp: (!) 21 (22 1150)  BP: 100/61 (22 1112)  SpO2: 95 % (22 1205)   Vital Signs (24h Range):  Temp:  [99 °F (37.2 °C)-100.1 °F (37.8 °C)] 99 °F (37.2 °C)  Pulse:  [] (P) 92  Resp:  [18-22] 21  SpO2:  [90 %-95 %] 95 %  BP: (100-133)/(55-90) 100/61     Weight: 89.1 kg (196 lb 6.9 oz)  Body mass index is 28.18 kg/m².    SpO2: 95 %  O2 Device (Oxygen Therapy): High Flow nasal Cannula      Intake/Output Summary (Last 24 hours) at 2022 1505  Last data filed at 2022 0845  Gross per 24 hour   Intake 444.77 ml   Output 1400 ml   Net -955.23 ml       Lines/Drains/Airways       Central Venous Catheter Line  Duration             Port A Cath Single Lumen right subclavian -- days              Peripheral Intravenous Line  Duration                  Peripheral IV - Single Lumen 22 1541 20 G Left Antecubital 1 day                     Physical Exam  Constitutional:       Appearance: He is well-developed.   HENT:      Head: Normocephalic and atraumatic.   Eyes:      Conjunctiva/sclera: Conjunctivae normal.      Pupils: Pupils are equal, round, and reactive to light.   Cardiovascular:      Rate and Rhythm: Tachycardia present. Rhythm irregular.      Pulses: Intact distal pulses.      Heart sounds: Normal heart sounds.   Pulmonary:      Effort: Pulmonary effort is normal.      Breath sounds: Normal breath sounds.   Abdominal:      General: Bowel sounds are normal.      Palpations: Abdomen is soft.   Musculoskeletal:         General: Normal range of motion.      Cervical back: Normal range of motion and neck supple.   Skin:     General: Skin is warm and dry.   Neurological:      Mental Status: He is alert and oriented to person, place, and time.       Significant Labs: All pertinent lab results from the last 24 hours have been reviewed.    Significant Imaging: Echocardiogram: 2Transthoracic echo (TTE) complete (Cupid Only):   Results for orders placed or performed during the hospital encounter of 05/17/22   Echo   Result Value Ref Range    Ascending aorta 3.07 cm    STJ 3.09 cm    AV mean gradient 6 mmHg    Ao peak luigi 1.55 m/s    Ao VTI 30.07 cm    IVS 1.45 (A) 0.6 - 1.1 cm    LA size 3.87 cm    Left Atrium Major Axis 5.75 cm    Left Atrium Minor Axis 5.52 cm    LVIDd 4.48 3.5 - 6.0 cm    LVIDs 2.81 2.1 - 4.0 cm    LVOT diameter 2.36 cm    LVOT peak VTI 24.35 cm    Posterior Wall 1.38 (A) 0.6 - 1.1 cm    MV Peak A Luigi 0.97 m/s    E wave deceleration time 299.29 msec    MV Peak E Luigi 0.59 m/s    PV Peak D Luigi 0.55 m/s    PV Peak S Luigi 0.82 m/s    RA Major Axis 5.79 cm    RA Width 4.21 cm    RVDD 4.86 cm    TAPSE 2.21 cm    TR Max Luigi 3.55 m/s    LA WIDTH 4.16 cm    Ao root annulus 3.57 cm    AORTIC VALVE CUSP SEPERATION 2.08 cm    PV PEAK VELOCITY 1.19 cm/s    MV stenosis pressure 1/2 time 86.80 ms    LV Diastolic Volume 91.62 mL    LV  Systolic Volume 29.93 mL    LVOT peak tere 1.16 m/s    TDI LATERAL 0.12 m/s    TDI SEPTAL 0.07 m/s    LV LATERAL E/E' RATIO 4.92 m/s    LV SEPTAL E/E' RATIO 8.43 m/s    FS 37 %    LA volume 77.08 cm3    LV mass 250.78 g    Left Ventricle Relative Wall Thickness 0.62 cm    AV valve area 3.54 cm2    AV Velocity Ratio 0.75     AV index (prosthetic) 0.81     MV valve area p 1/2 method 2.53 cm2    E/A ratio 0.61     Mean e' 0.10 m/s    Pulm vein S/D ratio 1.49     LVOT area 4.4 cm2    LVOT stroke volume 106.46 cm3    AV peak gradient 10 mmHg    E/E' ratio 6.21 m/s    LV Systolic Volume Index 14.4 mL/m2    LV Diastolic Volume Index 44.05 mL/m2    LA Volume Index 37.1 mL/m2    LV Mass Index 121 g/m2    Triscuspid Valve Regurgitation Peak Gradient 50 mmHg    BSA 2.17 m2    Sinus 3.20 cm    Right Atrial Pressure (from IVC) 3 mmHg    EF 65 %    TV rest pulmonary artery pressure 53 mmHg    Narrative    · The left ventricle is normal in size with concentric hypertrophy and   normal systolic function.  · The estimated ejection fraction is 65%.  · Indeterminate left ventricular diastolic function.  · Mild right ventricular enlargement with normal right ventricular   systolic function.  · Mild left atrial enlargement.  · Mild right atrial enlargement.  · Mild tricuspid regurgitation.  · Normal central venous pressure (3 mmHg).  · The estimated PA systolic pressure is 53 mmHg.  · There is pulmonary hypertension.

## 2022-09-30 NOTE — HPI
61M with SCC of the lung with mets to bone (on gemcitabine and radiation maintenance, s/p chemo and radiation and immunotherapy), COPD (on 2LPM home O2), Bronchiectasis, HTN, HFpEF, CVA, CAD, PAD, who presents with shortness of breath.  He reportedly was working in his yard and felt that his shortness of breath worsened.  He was directed to the ER by his oncologist. CTA performed by ER and found to have no acute PE and improvement in right pleural effusion in response to outpatient diuretic therapy.  He was nearing discharge and was on 2LPM yesterday.  This AM he had an abrupt increase in his FiO2 requirements to 5-6 LPM via NC.  Pulmonary was consulted for further input.

## 2022-09-30 NOTE — CONSULTS
[facility] ER Surg  Pulmonology  Consult Note    Patient Name: Kashif Iverson  MRN: [id number]  Admission Date: 9/28/2022  Hospital Length of Stay: 2 days  Code Status: Full Code  Attending Physician: Carroll Ambriz MD  Primary Care Provider: Guerline Higgins MD   Principal Problem: Acute on chronic respiratory failure with hypoxia    [unfilled]  Subjective:     HPI:  61M with SCC of the lung with mets to bone (on gemcitabine and radiation maintenance, s/p chemo and radiation and immunotherapy), COPD (on 2LPM home O2), Bronchiectasis, HTN, HFpEF, CVA, CAD, PAD, who presents with shortness of breath.  He reportedly was working in his yard and felt that his shortness of breath worsened.  He was directed to the ER by his oncologist. CTA performed by ER and found to have no acute PE and improvement in right pleural effusion in response to outpatient diuretic therapy.  He was nearing discharge and was on 2LPM yesterday.  This AM he had an abrupt increase in his FiO2 requirements to 5-6 LPM via NC.  Pulmonary was consulted for further input.      Past Medical History:   Diagnosis Date    Combined systolic and diastolic heart failure     Hypertension     Lung cancer     NSCLC    Lung mass     left lung       Past Surgical History:   Procedure Laterality Date    BRONCHOSCOPY N/A 8/30/2019    Procedure: Bronchoscopy;  Surgeon: [employer] Diagnostic Provider;  Location: [facility] OR [address] [place] [address];  Service: Anesthesiology;  Laterality: N/A;    CORONARY ANGIOGRAPHY N/A 3/4/2022    Procedure: ANGIOGRAM, CORONARY ARTERY;  Surgeon: Robson Green MD;  Location: [facility] CATH LAB;  Service: Cardiology;  Laterality: N/A;       Review of patient's allergies indicates:  No Known Allergies    Family History       Problem Relation (Age of Onset)    Cancer Father, Sister    Diabetes Mother    Heart defect Mother    No Known Problems Son          Tobacco Use    Smoking status: Former     Packs/day: 1.00     Years: 25.00     Pack years: 25.00      Types: Cigarettes     Quit date:      Years since quittin.7    Smokeless tobacco: Never   Substance and Sexual Activity    Alcohol use: Yes     Comment: ocassionally    Drug use: Never    Sexual activity: Yes     Partners: Female         Review of Systems   Constitutional:  Positive for activity change and fatigue. Negative for chills, diaphoresis and fever.   HENT:  Negative for congestion, postnasal drip and rhinorrhea.    Eyes: Negative.    Respiratory:  Positive for shortness of breath. Negative for cough, choking and chest tightness.    Cardiovascular:  Positive for palpitations and leg swelling. Negative for chest pain.   Gastrointestinal:  Negative for abdominal distention, abdominal pain, constipation, diarrhea, nausea and vomiting.   Endocrine: Negative.    Genitourinary: Negative.    Musculoskeletal: Negative.    Skin:  Negative for color change, pallor, rash and wound.   Allergic/Immunologic: Negative.    Neurological: Negative.    Hematological: Negative.    Psychiatric/Behavioral: Negative.     Objective:     Vital Signs (Most Recent):  Temp: 99 °F (37.2 °C) (22 1112)  Pulse: 92 (22 1503)  Resp: 19 (22 1503)  BP: 100/61 (22 1112)  SpO2: 95 % (22 1503) Vital Signs (24h Range):  Temp:  [99 °F (37.2 °C)-100.1 °F (37.8 °C)] 99 °F (37.2 °C)  Pulse:  [] 92  Resp:  [18-22] 19  SpO2:  [90 %-95 %] 95 %  BP: (100-133)/(61-90) 100/61     Weight: 89.1 kg (196 lb 6.9 oz)  Body mass index is 28.18 kg/m².      Intake/Output Summary (Last 24 hours) at 2022 1635  Last data filed at 2022 1540  Gross per 24 hour   Intake 444.77 ml   Output 1650 ml   Net -1205.23 ml       Physical Exam  Constitutional:       General: He is not in acute distress.     Appearance: Normal appearance. He is normal weight. He is not ill-appearing or toxic-appearing.   HENT:      Head: Normocephalic and atraumatic.      Nose: Nose normal.      Mouth/Throat:      Mouth: Mucous membranes are  dry.   Eyes:      Extraocular Movements: Extraocular movements intact.      Pupils: Pupils are equal, round, and reactive to light.   Cardiovascular:      Rate and Rhythm: Tachycardia present. Rhythm irregular.      Heart sounds: No murmur heard.    No friction rub. No gallop.   Pulmonary:      Effort: Pulmonary effort is normal.      Comments: Crackles noted diffusely, mild in nature.  Abdominal:      General: Abdomen is flat. Bowel sounds are normal. There is no distension.      Palpations: Abdomen is soft.   Musculoskeletal:         General: Normal range of motion.      Cervical back: Normal range of motion.      Right lower leg: Edema present.      Left lower leg: Edema present.   Skin:     General: Skin is warm.   Neurological:      General: No focal deficit present.      Mental Status: He is alert and oriented to person, place, and time. Mental status is at baseline.   Psychiatric:         Mood and Affect: Mood normal.         Behavior: Behavior normal.         Thought Content: Thought content normal.         Judgment: Judgment normal.       Vents:  Oxygen Concentration (%): 40 (09/30/22 1503)    Lines/Drains/Airways       Central Venous Catheter Line  Duration             Port A Cath Single Lumen right subclavian -- days              Peripheral Intravenous Line  Duration                  Peripheral IV - Single Lumen 09/28/22 1541 20 G Left Antecubital 2 days                    Significant Labs:    CBC/Anemia Profile:  Recent Labs   Lab 09/29/22  0523   WBC 12.22   HGB 13.0*   HCT 38.4*      MCV 92   RDW 14.6*        Chemistries:  Recent Labs   Lab 09/29/22  0523 09/30/22  1033   *  --    K 4.2  --      --    CO2 23  --    BUN 15  --    CREATININE 0.9  --    CALCIUM 8.7  --    MG 1.5* 1.7   PHOS 2.8  --        All pertinent labs within the past 24 hours have been reviewed.    Significant Imaging:   I have reviewed all pertinent imaging results/findings within the past 24 hours.      ABG  No  results for input(s): PH, PO2, PCO2, HCO3, BE in the last 168 hours.  Assessment/Plan:     * Acute on chronic respiratory failure with hypoxia  Increase in O2 requirements are likely due to atrial fibrillation.    - continue rate control and PRN diuresis if pulmonary edema is an issue.  - antibiotic therapy as detailed below.    Persistent atrial fibrillation  Likely culprit of increased O2 requirements this AM.  Continue management per cardiology.    COPD (chronic obstructive pulmonary disease)  Possible exacerbation given bronchiectasis, and lung cancer cause an increased disposition for infection.   - augmentin started at this time.  Recommend 5 days as long as he remains afebrile.  Given distorted architecture of lung, this will give some anaerobic coverage, if needed.    - continue nebulizer therapy q4hr PRN  - continue fluticasone furoate - vilanterol 100-25.  Continue home albuterol PRN.  - prednisone 40mg Qday x 5 days.  - sputum culture obtained.  Narrow/adjust abx based on results.    Chronic combined systolic and diastolic heart failure  Appears somewhat compensated at this time, however apparently in a. Fib when seen today.  This is likely causing decompensation and subsequently worsening respiratory status.  - continue goal directed therapy and diuresis as needed.  - heart rate control imperative.  Cardiology on board.    Lung cancer, main bronchus, left  Prolonged history of lung cancer with multiple therapies tried in past.  Currently on gemcitabine and radiation therapy.  Following closely with oncology.  - continue treatment plan as dictated by onc.          Thank you for your consult. I will follow peripherally. Please contact me with further questions, should they arise.     Oumar Danielson MD  Pulmonology  [facility] - [place] - Med Surg

## 2022-09-30 NOTE — NURSING
[facility] Rapid Response Follow-up Note     Followed up with patient for proactive rounding.   MEWS 3, 93% on 5L NC. Assessed pt @ 20:50, pt resting in bed, no SOB. Pt has no complaints at this time. Will continue to monitor.   Please call Rapid Response RN with any questions or concerns at  [phone]

## 2022-09-30 NOTE — ASSESSMENT & PLAN NOTE
Prolonged history of lung cancer with multiple therapies tried in past.  Currently on gemcitabine and radiation therapy.  Following closely with oncology.  - continue treatment plan as dictated by onc.

## 2022-09-30 NOTE — ASSESSMENT & PLAN NOTE
Increase in O2 requirements are likely due to atrial fibrillation.    - continue rate control and PRN diuresis if pulmonary edema is an issue.  - antibiotic therapy as detailed below.

## 2022-09-30 NOTE — PLAN OF CARE
Problem: Adult Inpatient Plan of Care  Goal: Plan of Care Review  Outcome: Ongoing, Progressing  Goal: Patient-Specific Goal (Individualized)  Outcome: Ongoing, Progressing  Goal: Optimal Comfort and Wellbeing  Outcome: Ongoing, Progressing     Pt alert and oriented. Pt free from falls or any further trauma through out the shift. Prescribed medication administered. No complaints of pain. Pt tachycardiac. Pt on 5 lpm O@ via high flow nasal cannula. Pt in no distress. Will continue to monitor.

## 2022-09-30 NOTE — HOSPITAL COURSE
When seen, a pulse oximeter was placed on the patient with intention to wean oxygen.  This revealed an erratic heart beat with rates as high as in the 150s concerning for a. Fib.  EKG ordered and cardiology consulted.  CTA reviewed and multiple areas of bronchiectasis present that may harbor infection.  Patient seems in good spirits, but is fatigued.

## 2022-10-01 NOTE — ASSESSMENT & PLAN NOTE
Likely culprit of increased O2 requirements this AM.  Continue management per cardiology.  - still tachycardic on exam today.

## 2022-10-01 NOTE — PROGRESS NOTES
[facility]  Cardiology  Progress Note    Patient Name: Kashif Iverson  MRN: [id number]  Admission Date: 9/28/2022  Hospital Length of Stay: 3 days  Code Status: Full Code   Attending Physician: Carroll Ambriz MD   Primary Care Physician: Guerline Higgins MD  Expected Discharge Date:   Principal Problem:Acute on chronic respiratory failure with hypoxia    Subjective:     Hospital Course:   10/1/22 A-fib . SBP low 100s    Echo 9/30/22   The left ventricle is normal in size with concentric hypertrophy and normal systolic function.   The estimated ejection fraction is 60%.   Normal left ventricular diastolic function.   Mild right ventricular enlargement with mildly reduced right ventricular systolic function.   Mild left atrial enlargement.   Mild right atrial enlargement.   Normal central venous pressure (3 mmHg).   The estimated PA systolic pressure is 40 mmHg.   Trivial posterior pericardial effusion.      Interval History:     Review of Systems   Unable to perform ROS: Acuity of condition   Objective:     Vital Signs (Most Recent):  Temp: 99.6 °F (37.6 °C) (10/01/22 1131)  Pulse: 93 (10/01/22 1135)  Resp: 20 (10/01/22 1131)  BP: 116/71 (10/01/22 1135)  SpO2: (!) 91 % (10/01/22 1131)   Vital Signs (24h Range):  Temp:  [97.5 °F (36.4 °C)-99.6 °F (37.6 °C)] 99.6 °F (37.6 °C)  Pulse:  [] 93  Resp:  [18-26] 20  SpO2:  [82 %-99 %] 91 %  BP: ()/(54-71) 116/71     Weight: 89.1 kg (196 lb 6.9 oz)  Body mass index is 28.18 kg/m².     SpO2: (!) 91 %  O2 Device (Oxygen Therapy): High Flow nasal Cannula      Intake/Output Summary (Last 24 hours) at 10/1/2022 1152  Last data filed at 10/1/2022 0838  Gross per 24 hour   Intake 401.88 ml   Output 850 ml   Net -448.12 ml       Lines/Drains/Airways       Central Venous Catheter Line  Duration             Port A Cath Single Lumen right subclavian -- days              Peripheral Intravenous Line  Duration                  Peripheral IV - Single Lumen  09/28/22 1541 20 G Left Antecubital 2 days                    Physical Exam  Constitutional:       Appearance: He is well-developed.   HENT:      Head: Normocephalic and atraumatic.   Eyes:      Conjunctiva/sclera: Conjunctivae normal.      Pupils: Pupils are equal, round, and reactive to light.   Cardiovascular:      Rate and Rhythm: Tachycardia present. Rhythm irregular.      Pulses: Intact distal pulses.      Heart sounds: Normal heart sounds.   Pulmonary:      Effort: Pulmonary effort is normal.      Breath sounds: Normal breath sounds.   Abdominal:      General: Bowel sounds are normal.      Palpations: Abdomen is soft.   Musculoskeletal:         General: Normal range of motion.      Cervical back: Normal range of motion and neck supple.   Skin:     General: Skin is warm and dry.   Neurological:      Mental Status: He is alert and oriented to person, place, and time.       Significant Labs: All pertinent lab results from the last 24 hours have been reviewed.    Significant Imaging: Echocardiogram: Transthoracic echo (TTE) complete (Cupid Only):   Results for orders placed or performed during the hospital encounter of 09/28/22   Echo   Result Value Ref Range    BSA 2.11 m2    TDI SEPTAL 0.10 m/s    LV LATERAL E/E' RATIO 7.00 m/s    LV SEPTAL E/E' RATIO 9.10 m/s    IVC diameter 1.84 cm    Left Ventricular Outflow Tract Mean Velocity 0.70 cm/s    Left Ventricular Outflow Tract Mean Gradient 2.26 mmHg    AORTIC VALVE CUSP SEPERATION 2.32 cm    TDI LATERAL 0.13 m/s    PV PEAK VELOCITY 1.29 cm/s    LVIDd 4.35 3.5 - 6.0 cm    IVS 1.04 0.6 - 1.1 cm    Posterior Wall 1.11 (A) 0.6 - 1.1 cm    Ao root annulus 3.89 cm    LVIDs 3.27 2.1 - 4.0 cm    FS 25 28 - 44 %    Sinus 3.35 cm    STJ 2.95 cm    Ascending aorta 2.86 cm    LV mass 160.60 g    LA size 4.68 cm    RVDD 4.37 cm    TAPSE 1.77 cm    Left Ventricle Relative Wall Thickness 0.51 cm    AV mean gradient 4 mmHg    AV valve area 3.77 cm2    AV Velocity Ratio 0.76      AV index (prosthetic) 0.93     Mean e' 0.12 m/s    IVRT 91.34 msec    LVOT diameter 2.27 cm    LVOT area 4.0 cm2    LVOT peak luigi 1.02 m/s    LVOT peak VTI 14.90 cm    Ao peak luigi 1.34 m/s    Ao VTI 16.0 cm    LVOT stroke volume 60.27 cm3    AV peak gradient 7 mmHg    E/E' ratio 7.91 m/s    MV Peak E Luigi 0.91 m/s    TR Max Luigi 3.05 m/s    LV Systolic Volume 43.16 mL    LV Systolic Volume Index 20.9 mL/m2    LV Diastolic Volume 85.31 mL    LV Diastolic Volume Index 41.21 mL/m2    LV Mass Index 78 g/m2    RA Major Axis 5.98 cm    Left Atrium Minor Axis 4.78 cm    Triscuspid Valve Regurgitation Peak Gradient 37 mmHg    RA Width 5.17 cm    LA WIDTH 4.80 cm    Right Atrial Pressure (from IVC) 3 mmHg    EF 60 %    TV rest pulmonary artery pressure 40 mmHg    Narrative    · The left ventricle is normal in size with concentric hypertrophy and   normal systolic function.  · The estimated ejection fraction is 60%.  · Normal left ventricular diastolic function.  · Mild right ventricular enlargement with mildly reduced right ventricular   systolic function.  · Mild left atrial enlargement.  · Mild right atrial enlargement.  · Normal central venous pressure (3 mmHg).  · The estimated PA systolic pressure is 40 mmHg.  · Trivial posterior pericardial effusion.        Assessment and Plan:     Brief HPI:     * Acute on chronic respiratory failure with hypoxia  Per primary    Persistent atrial fibrillation  New Dx. Rates improved after metoprolol. Repeat echo. Switch to DOAC prior to discharge. Consider out patient CV if A-fib persists    10/1/22 Still with A-fib. Rates up some. Continue po metoprolol with IV as needed. EF 60% on echo  Hold other BP meds with borderline BP    Coronary artery disease involving native coronary artery of native heart without angina pectoris  Non-obstructive CAD by [facility] 3/4/22    PAD (peripheral artery disease)  Per Dr Roman    Lung cancer, main bronchus, left  Per primary        VTE Risk Mitigation (From  admission, onward)         Ordered     heparin (porcine) injection 5,000 Units  Every 8 hours         09/28/22 2203     IP VTE HIGH RISK PATIENT  Once         09/28/22 2203     Place sequential compression device  Until discontinued         09/28/22 2203                Robson Green MD  Cardiology  [facility]

## 2022-10-01 NOTE — SUBJECTIVE & OBJECTIVE
Interval History:     Review of Systems   Unable to perform ROS: Acuity of condition   Objective:     Vital Signs (Most Recent):  Temp: 99.6 °F (37.6 °C) (10/01/22 1131)  Pulse: 93 (10/01/22 1135)  Resp: 20 (10/01/22 1131)  BP: 116/71 (10/01/22 1135)  SpO2: (!) 91 % (10/01/22 1131)   Vital Signs (24h Range):  Temp:  [97.5 °F (36.4 °C)-99.6 °F (37.6 °C)] 99.6 °F (37.6 °C)  Pulse:  [] 93  Resp:  [18-26] 20  SpO2:  [82 %-99 %] 91 %  BP: ()/(54-71) 116/71     Weight: 89.1 kg (196 lb 6.9 oz)  Body mass index is 28.18 kg/m².     SpO2: (!) 91 %  O2 Device (Oxygen Therapy): High Flow nasal Cannula      Intake/Output Summary (Last 24 hours) at 10/1/2022 1152  Last data filed at 10/1/2022 0838  Gross per 24 hour   Intake 401.88 ml   Output 850 ml   Net -448.12 ml       Lines/Drains/Airways       Central Venous Catheter Line  Duration             Port A Cath Single Lumen right subclavian -- days              Peripheral Intravenous Line  Duration                  Peripheral IV - Single Lumen 09/28/22 1541 20 G Left Antecubital 2 days                    Physical Exam  Constitutional:       Appearance: He is well-developed.   HENT:      Head: Normocephalic and atraumatic.   Eyes:      Conjunctiva/sclera: Conjunctivae normal.      Pupils: Pupils are equal, round, and reactive to light.   Cardiovascular:      Rate and Rhythm: Tachycardia present. Rhythm irregular.      Pulses: Intact distal pulses.      Heart sounds: Normal heart sounds.   Pulmonary:      Effort: Pulmonary effort is normal.      Breath sounds: Normal breath sounds.   Abdominal:      General: Bowel sounds are normal.      Palpations: Abdomen is soft.   Musculoskeletal:         General: Normal range of motion.      Cervical back: Normal range of motion and neck supple.   Skin:     General: Skin is warm and dry.   Neurological:      Mental Status: He is alert and oriented to person, place, and time.       Significant Labs: All pertinent lab results from  the last 24 hours have been reviewed.    Significant Imaging: Echocardiogram: Transthoracic echo (TTE) complete (Cupid Only):   Results for orders placed or performed during the hospital encounter of 09/28/22   Echo   Result Value Ref Range    BSA 2.11 m2    TDI SEPTAL 0.10 m/s    LV LATERAL E/E' RATIO 7.00 m/s    LV SEPTAL E/E' RATIO 9.10 m/s    IVC diameter 1.84 cm    Left Ventricular Outflow Tract Mean Velocity 0.70 cm/s    Left Ventricular Outflow Tract Mean Gradient 2.26 mmHg    AORTIC VALVE CUSP SEPERATION 2.32 cm    TDI LATERAL 0.13 m/s    PV PEAK VELOCITY 1.29 cm/s    LVIDd 4.35 3.5 - 6.0 cm    IVS 1.04 0.6 - 1.1 cm    Posterior Wall 1.11 (A) 0.6 - 1.1 cm    Ao root annulus 3.89 cm    LVIDs 3.27 2.1 - 4.0 cm    FS 25 28 - 44 %    Sinus 3.35 cm    STJ 2.95 cm    Ascending aorta 2.86 cm    LV mass 160.60 g    LA size 4.68 cm    RVDD 4.37 cm    TAPSE 1.77 cm    Left Ventricle Relative Wall Thickness 0.51 cm    AV mean gradient 4 mmHg    AV valve area 3.77 cm2    AV Velocity Ratio 0.76     AV index (prosthetic) 0.93     Mean e' 0.12 m/s    IVRT 91.34 msec    LVOT diameter 2.27 cm    LVOT area 4.0 cm2    LVOT peak luigi 1.02 m/s    LVOT peak VTI 14.90 cm    Ao peak luigi 1.34 m/s    Ao VTI 16.0 cm    LVOT stroke volume 60.27 cm3    AV peak gradient 7 mmHg    E/E' ratio 7.91 m/s    MV Peak E Uligi 0.91 m/s    TR Max Luigi 3.05 m/s    LV Systolic Volume 43.16 mL    LV Systolic Volume Index 20.9 mL/m2    LV Diastolic Volume 85.31 mL    LV Diastolic Volume Index 41.21 mL/m2    LV Mass Index 78 g/m2    RA Major Axis 5.98 cm    Left Atrium Minor Axis 4.78 cm    Triscuspid Valve Regurgitation Peak Gradient 37 mmHg    RA Width 5.17 cm    LA WIDTH 4.80 cm    Right Atrial Pressure (from IVC) 3 mmHg    EF 60 %    TV rest pulmonary artery pressure 40 mmHg    Narrative    · The left ventricle is normal in size with concentric hypertrophy and   normal systolic function.  · The estimated ejection fraction is 60%.  · Normal left  ventricular diastolic function.  · Mild right ventricular enlargement with mildly reduced right ventricular   systolic function.  · Mild left atrial enlargement.  · Mild right atrial enlargement.  · Normal central venous pressure (3 mmHg).  · The estimated PA systolic pressure is 40 mmHg.  · Trivial posterior pericardial effusion.

## 2022-10-01 NOTE — PROGRESS NOTES
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     The patient developed worsening shortness of breath one day prior to presentation. The patient reports that his SOB became worse because he was in his yard and was bothered by the heat. His episode was associated with worsening hypoxia documented at home (SpO2: 88%, on 3L, with occasional drops to the high 70s). His wife called the oncology clinic and they were instructed to present to the ED for further management. The patient denies having worsening cough and reports feeling fine at the time of examination. He denied having any chest pain but did report having worsening lower extremity edema. He is compliant with his medications but no longer taking Lasix. No fevers or chills. No urinary or GI symptoms. While in the ER, the patient was saturating 92% on 6L of supplemental O2, but otherwise was hemodynamically stable. Labs showed no leukocytosis (11.1), normocytic anemia (12.5), elevated BNP (447), negative lactate and procalcitonin (0.15). CTA chest showed no evidence of acute pulmonary thromboembolism, and a decrease in right pleural fluid collection with stable consolidated changes in the right and left lungs. He was administered Duo-Nebs, aspirin 325 mg and ceftriaxone. He was admitted for further management.       Overview/Hospital Course:  Patient was admitted to the  hospital for SOB- due to possible CHF/COPD/progression of lung CA and pulmonary hypertension.  Started on treatment.  Pulmonary consulted. Palliative care was consulted as well.       Interval History: No new issues     Review of Systems   Constitutional:  Negative for activity change.   HENT:  Negative for congestion and dental problem.    Eyes:  Negative for discharge and itching.   Respiratory:  Negative for chest tightness and shortness of breath.    Cardiovascular:  Negative for chest pain.   Gastrointestinal:  Negative for abdominal pain.   Genitourinary:  Negative for difficulty urinating and dysuria.   Musculoskeletal:  Negative for arthralgias.   Neurological:  Negative for dizziness and facial asymmetry.   Psychiatric/Behavioral:  Negative for agitation and behavioral problems.    Objective:     Vital Signs (Most Recent):  Temp: 99.4 °F (37.4 °C) (10/01/22 0706)  Pulse: 91 (10/01/22 0716)  Resp: 20 (10/01/22 0716)  BP: 104/64 (10/01/22 0706)  SpO2: 99 % (10/01/22 0808) Vital Signs (24h Range):  Temp:  [97.5 °F (36.4 °C)-99.4 °F (37.4 °C)] 99.4 °F (37.4 °C)  Pulse:  [] 91  Resp:  [18-22] 20  SpO2:  [82 %-99 %] 99 %  BP: ()/(54-64) 104/64     Weight: 89.1 kg (196 lb 6.9 oz)  Body mass index is 28.18 kg/m².    Intake/Output Summary (Last 24 hours) at 10/1/2022 1002  Last data filed at 10/1/2022 0838  Gross per 24 hour   Intake 401.88 ml   Output 850 ml   Net -448.12 ml      Physical Exam  Vitals and nursing note reviewed.   Constitutional:       General: He is not in acute distress.     Appearance: Normal appearance. He is normal weight. He is not ill-appearing, toxic-appearing or diaphoretic.   Eyes:      Conjunctiva/sclera: Conjunctivae normal.   Cardiovascular:      Rate and Rhythm: Normal rate and regular rhythm.      Heart sounds:     No gallop.   Pulmonary:      Effort: Pulmonary effort is normal. No respiratory distress.   Skin:     General: Skin is warm and dry.   Neurological:      Mental  Status: He is alert and oriented to person, place, and time.   Psychiatric:         Mood and Affect: Mood normal.         Behavior: Behavior normal.         Thought Content: Thought content normal.       Significant Labs: All pertinent labs within the past 24 hours have been reviewed.  BMP:   Recent Labs   Lab 09/30/22  1033 10/01/22  0600   GLU  --  127*   NA  --  132*   K  --  3.5   CL  --  99   CO2  --  24   BUN  --  39*   CREATININE  --  1.3   CALCIUM  --  8.4*   MG 1.7  --      CBC: No results for input(s): WBC, HGB, HCT, PLT in the last 48 hours.    Significant Imaging:       Assessment/Plan:      * Acute on chronic respiratory failure with hypoxia  Patient with Hypoxic Respiratory failure which is Acute on chronic.  he is on home oxygen at 2 LPM. Supplemental oxygen was provided and noted- Oxygen Concentration (%):  [40-44] 40.   Signs/symptoms of respiratory failure include- increased work of breathing and respiratory distress. Contributing diagnoses includes - CHF and COPD Labs and images were reviewed. Patient Has not had a recent ABG. Will treat underlying causes and adjust management of respiratory failure as follows- See plan for heart failure/COPD     Multifactorial including possible diastolic dysfunction/COPD/Pulmoanry hypertension as well as possible progression of lung CA. Still with high 02 requirements. Will consult pulmonary and palliative care     Pulmonary recs    Left leg cellulitis  - Patietn reports having AMADEO (L>R)   - Erythema and warmth noted on physical examination  - Likely related to underlying cellulitis     Plan:   - Vancomycin, pharmacy to dose  - Monitor progression/resolution       COPD (chronic obstructive pulmonary disease)  - History of COPD on supplemental O2   - Presented with worsening SOB   - Likely related to a COPD flare (in the setting of recent weather changes)     Plan:   - Prednisone 40 mg x 5 days   - Continue home inhalers   - Duo-Nebs scheduled  - Does not meet  criteria for antibiotics     Coronary artery disease involving native coronary artery of native heart without angina pectoris  - Continue home medications (aspirin, Coreg, statin and Plavix)     PAD (peripheral artery disease)  - Resume home medications (aspirin, statin and cilostazol)   - Follow up outpatient with cardiology     Chronic combined systolic and diastolic heart failure  - Echocardiogram (2/22/2022): EF: 35%-40%, GIDD, normal RV systolic function, mild TR   - Echocardiogram (5/18/2022): EF: 65%, indeterminate LV diastolic function, mild TR, PA pressure of 54 mmHg  - Patient presented with worsening SOB and AMADEO   - Elevated BNP, compared to most recent readings   - Reports that he no longer takes his Lasix   - Suspect that he is in exacerbation     Plan:   - Lasix 40 mg IV BID   - Resume home medications     Increase lasix     Essential hypertension  - Resume home medications (amlodipine 10 mg, Coreg 6.25 mg BID, Losartan 100 mg)     Lung cancer, main bronchus, left  - On maintenance chemotherapy and radiation   - Outpatient follow up with oncology         VTE Risk Mitigation (From admission, onward)         Ordered     heparin (porcine) injection 5,000 Units  Every 8 hours         09/28/22 2203     IP VTE HIGH RISK PATIENT  Once         09/28/22 2203     Place sequential compression device  Until discontinued         09/28/22 2203                Discharge Planning   AZ:      Code Status: Full Code   Is the patient medically ready for discharge?:     Reason for patient still in hospital (select all that apply): Patient unstable  Discharge Plan A: Home Health                  Carroll Syed MD  Department of Hospital Medicine   [facility]

## 2022-10-01 NOTE — SUBJECTIVE & OBJECTIVE
Interval History:  This AM he is resting in bed.  O2 weaned to 4LPM and sats are 94%.  Still seems to be in a. Fib on exam (pulse ox with rapidly alternating rate between 100-130).    Review of Systems   Constitutional:  Positive for activity change and fatigue. Negative for chills, diaphoresis and fever.   HENT:  Negative for congestion, postnasal drip and rhinorrhea.    Eyes: Negative.    Respiratory:  Positive for shortness of breath. Negative for cough, choking and chest tightness.    Cardiovascular:  Positive for palpitations and leg swelling. Negative for chest pain.   Gastrointestinal:  Negative for abdominal distention, abdominal pain, constipation, diarrhea, nausea and vomiting.   Endocrine: Negative.    Genitourinary: Negative.    Musculoskeletal: Negative.    Skin:  Negative for color change, pallor, rash and wound.   Allergic/Immunologic: Negative.    Neurological: Negative.    Hematological: Negative.    Psychiatric/Behavioral: Negative.     Objective:     Vital Signs (Most Recent):  Temp: 99.6 °F (37.6 °C) (10/01/22 1131)  Pulse: 110 (10/01/22 1201)  Resp: 20 (10/01/22 1131)  BP: 122/68 (10/01/22 1201)  SpO2: (!) 94 % (10/01/22 1201) Vital Signs (24h Range):  Temp:  [97.5 °F (36.4 °C)-99.6 °F (37.6 °C)] 99.6 °F (37.6 °C)  Pulse:  [] 110  Resp:  [18-26] 20  SpO2:  [82 %-99 %] 94 %  BP: ()/(54-71) 122/68     Weight: 89.1 kg (196 lb 6.9 oz)  Body mass index is 28.18 kg/m².      Intake/Output Summary (Last 24 hours) at 10/1/2022 1211  Last data filed at 10/1/2022 0838  Gross per 24 hour   Intake 401.88 ml   Output 850 ml   Net -448.12 ml         Physical Exam  Constitutional:       General: He is not in acute distress.     Appearance: Normal appearance. He is normal weight. He is not ill-appearing or toxic-appearing.   HENT:      Head: Normocephalic and atraumatic.      Nose: Nose normal.      Mouth/Throat:      Mouth: Mucous membranes are dry.   Eyes:      Extraocular Movements: Extraocular  movements intact.      Pupils: Pupils are equal, round, and reactive to light.   Cardiovascular:      Rate and Rhythm: Tachycardia present. Rhythm irregular.      Heart sounds: No murmur heard.    No friction rub. No gallop.   Pulmonary:      Effort: Pulmonary effort is normal.      Comments: Air entry more clear today.  Abdominal:      General: Abdomen is flat. Bowel sounds are normal. There is no distension.      Palpations: Abdomen is soft.   Musculoskeletal:         General: Normal range of motion.      Cervical back: Normal range of motion.      Right lower leg: Edema present.      Left lower leg: Edema present.   Skin:     General: Skin is warm.   Neurological:      General: No focal deficit present.      Mental Status: He is alert and oriented to person, place, and time. Mental status is at baseline.   Psychiatric:         Mood and Affect: Mood normal.         Behavior: Behavior normal.         Thought Content: Thought content normal.         Judgment: Judgment normal.       Vents:  Oxygen Concentration (%): 40 (09/30/22 2121)    Lines/Drains/Airways       Central Venous Catheter Line  Duration             Port A Cath Single Lumen right subclavian -- days              Peripheral Intravenous Line  Duration                  Peripheral IV - Single Lumen 09/28/22 1541 20 G Left Antecubital 2 days                    Significant Labs:    CBC/Anemia Profile:  No results for input(s): WBC, HGB, HCT, PLT, MCV, RDW, IRON, FERRITIN, RETIC, FOLATE, [id number], OCCULTBLOOD in the last 48 hours.       Chemistries:  Recent Labs   Lab 09/30/22  1033 10/01/22  0600   NA  --  132*   K  --  3.5   CL  --  99   CO2  --  24   BUN  --  39*   CREATININE  --  1.3   CALCIUM  --  8.4*   MG 1.7  --          All pertinent labs within the past 24 hours have been reviewed.    Significant Imaging:   I have reviewed all pertinent imaging results/findings within the past 24 hours.

## 2022-10-01 NOTE — NURSING
Patient bp 94/62. Patient has carvedilol ordered. Notified Irina Valdez Np of patient's blood pressure.orders given to hold dosage.

## 2022-10-01 NOTE — PLAN OF CARE
Problem: Adult Inpatient Plan of Care  Goal: Plan of Care Review  Outcome: Ongoing, Progressing  Goal: Patient-Specific Goal (Individualized)  Outcome: Ongoing, Progressing  Goal: Absence of Hospital-Acquired Illness or Injury  Outcome: Ongoing, Progressing  Goal: Optimal Comfort and Wellbeing  Outcome: Ongoing, Progressing  Goal: Readiness for Transition of Care  Outcome: Ongoing, Progressing     Problem: Infection  Goal: Absence of Infection Signs and Symptoms  Outcome: Ongoing, Progressing     Problem: Coping Ineffective  Goal: Effective Coping  Outcome: Ongoing, Progressing

## 2022-10-01 NOTE — PROGRESS NOTES
[facility] S[facility]  Pulmonology  Progress Note    Patient Name: Kashif Iverson  MRN: [id number]  Admission Date: 9/28/2022  Hospital Length of Stay: 3 days  Code Status: Full Code  Attending Provider: Carroll Ambriz MD  Primary Care Provider: Guerline Higgins MD   Principal Problem: Acute on chronic respiratory failure with hypoxia    Subjective:     Interval History:  This AM he is resting in bed.  O2 weaned to 4LPM and sats are 94%.  Still seems to be in a. Fib on exam (pulse ox with rapidly alternating rate between 100-130).    Review of Systems   Constitutional:  Positive for activity change and fatigue. Negative for chills, diaphoresis and fever.   HENT:  Negative for congestion, postnasal drip and rhinorrhea.    Eyes: Negative.    Respiratory:  Positive for shortness of breath. Negative for cough, choking and chest tightness.    Cardiovascular:  Positive for palpitations and leg swelling. Negative for chest pain.   Gastrointestinal:  Negative for abdominal distention, abdominal pain, constipation, diarrhea, nausea and vomiting.   Endocrine: Negative.    Genitourinary: Negative.    Musculoskeletal: Negative.    Skin:  Negative for color change, pallor, rash and wound.   Allergic/Immunologic: Negative.    Neurological: Negative.    Hematological: Negative.    Psychiatric/Behavioral: Negative.     Objective:     Vital Signs (Most Recent):  Temp: 99.6 °F (37.6 °C) (10/01/22 1131)  Pulse: 110 (10/01/22 1201)  Resp: 20 (10/01/22 1131)  BP: 122/68 (10/01/22 1201)  SpO2: (!) 94 % (10/01/22 1201) Vital Signs (24h Range):  Temp:  [97.5 °F (36.4 °C)-99.6 °F (37.6 °C)] 99.6 °F (37.6 °C)  Pulse:  [] 110  Resp:  [18-26] 20  SpO2:  [82 %-99 %] 94 %  BP: ()/(54-71) 122/68     Weight: 89.1 kg (196 lb 6.9 oz)  Body mass index is 28.18 kg/m².      Intake/Output Summary (Last 24 hours) at 10/1/2022 1211  Last data filed at 10/1/2022 0838  Gross per 24 hour   Intake 401.88 ml   Output 850 ml   Net -448.12 ml          Physical Exam  Constitutional:       General: He is not in acute distress.     Appearance: Normal appearance. He is normal weight. He is not ill-appearing or toxic-appearing.   HENT:      Head: Normocephalic and atraumatic.      Nose: Nose normal.      Mouth/Throat:      Mouth: Mucous membranes are dry.   Eyes:      Extraocular Movements: Extraocular movements intact.      Pupils: Pupils are equal, round, and reactive to light.   Cardiovascular:      Rate and Rhythm: Tachycardia present. Rhythm irregular.      Heart sounds: No murmur heard.    No friction rub. No gallop.   Pulmonary:      Effort: Pulmonary effort is normal.      Comments: Air entry more clear today.  Abdominal:      General: Abdomen is flat. Bowel sounds are normal. There is no distension.      Palpations: Abdomen is soft.   Musculoskeletal:         General: Normal range of motion.      Cervical back: Normal range of motion.      Right lower leg: Edema present.      Left lower leg: Edema present.   Skin:     General: Skin is warm.   Neurological:      General: No focal deficit present.      Mental Status: He is alert and oriented to person, place, and time. Mental status is at baseline.   Psychiatric:         Mood and Affect: Mood normal.         Behavior: Behavior normal.         Thought Content: Thought content normal.         Judgment: Judgment normal.       Vents:  Oxygen Concentration (%): 40 (09/30/22 2121)    Lines/Drains/Airways       Central Venous Catheter Line  Duration             Port A Cath Single Lumen right subclavian -- days              Peripheral Intravenous Line  Duration                  Peripheral IV - Single Lumen 09/28/22 1541 20 G Left Antecubital 2 days                    Significant Labs:    CBC/Anemia Profile:  No results for input(s): WBC, HGB, HCT, PLT, MCV, RDW, IRON, FERRITIN, RETIC, FOLATE, [id number], OCCULTBLOOD in the last 48 hours.       Chemistries:  Recent Labs   Lab 09/30/22  1033 10/01/22  0600   NA   --  132*   K  --  3.5   CL  --  99   CO2  --  24   BUN  --  39*   CREATININE  --  1.3   CALCIUM  --  8.4*   MG 1.7  --          All pertinent labs within the past 24 hours have been reviewed.    Significant Imaging:   I have reviewed all pertinent imaging results/findings within the past 24 hours.      ABG  No results for input(s): PH, PO2, PCO2, HCO3, BE in the last 168 hours.  Assessment/Plan:     * Acute on chronic respiratory failure with hypoxia  Increase in O2 requirements are likely due to atrial fibrillation.    - continue rate control and PRN diuresis if pulmonary edema is an issue.  - antibiotic therapy as detailed below.    Persistent atrial fibrillation  Likely culprit of increased O2 requirements this AM.  Continue management per cardiology.  - still tachycardic on exam today.    COPD (chronic obstructive pulmonary disease)  Possible exacerbation given bronchiectasis, and lung cancer cause an increased disposition for infection.   - augmentin started at this time.  Recommend 5 days as long as he remains afebrile.  Given distorted architecture of lung, this will give some anaerobic coverage, if needed.  Day 2/5.  - continue nebulizer therapy q4hr PRN  - continue fluticasone furoate - vilanterol 100-25.  Continue home albuterol PRN.  - prednisone 40mg Qday x 5 days (day 4)  - sputum culture with normal maksim.    Chronic combined systolic and diastolic heart failure  Appears somewhat compensated at this time, however apparently in a. Fib when seen today.  This is likely causing decompensation and subsequently worsening respiratory status.  - continue goal directed therapy and diuresis as needed.  - heart rate control imperative.  Cardiology on board.    Lung cancer, main bronchus, left  Prolonged history of lung cancer with multiple therapies tried in past.  Currently on gemcitabine and radiation therapy.  Following closely with oncology.  - continue treatment plan as dictated by onc.      Pulmonary will  sign off at this time.  Please re-consult with further questions or needs.     Oumar Danielson MD  Pulmonology  [facility]

## 2022-10-01 NOTE — CONSULTS
2004:  The patient and his medical provider, both disclosed that the patient has been diagnosed with cancer. The patient further stated that he has been  for thirty-six years and has two children. 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  Medical provider  reported that the patient is currently on oxygen at home. She also stated that the patient's wife has communicated with the patient and the medical provider via telephone.  Prayers were offered for the patient. The SpiritualCare Team will continue to provide spiritual support to this patient.        RONAN Mejia   [phone]

## 2022-10-01 NOTE — NURSING
[facility]  Nurses Note -- 4 Eyes      10/1/2022       Skin assessed on: Saturday      [x] No Pressure Injuries Present    []Prevention Measures Documented    [] Yes LDA  for Pressure Injury Previously documented     [] Yes New Pressure Injury Discovered   [] LDA for New Pressure Injury Added      Attending RN:  Ct Elise LPN     Second RN:  Maria Antonia Jeffery RN

## 2022-10-01 NOTE — SUBJECTIVE & OBJECTIVE
Interval History: No new issues     Review of Systems   Constitutional:  Negative for activity change.   HENT:  Negative for congestion and dental problem.    Eyes:  Negative for discharge and itching.   Respiratory:  Negative for chest tightness and shortness of breath.    Cardiovascular:  Negative for chest pain.   Gastrointestinal:  Negative for abdominal pain.   Genitourinary:  Negative for difficulty urinating and dysuria.   Musculoskeletal:  Negative for arthralgias.   Neurological:  Negative for dizziness and facial asymmetry.   Psychiatric/Behavioral:  Negative for agitation and behavioral problems.    Objective:     Vital Signs (Most Recent):  Temp: 99.4 °F (37.4 °C) (10/01/22 0706)  Pulse: 91 (10/01/22 0716)  Resp: 20 (10/01/22 0716)  BP: 104/64 (10/01/22 0706)  SpO2: 99 % (10/01/22 0808) Vital Signs (24h Range):  Temp:  [97.5 °F (36.4 °C)-99.4 °F (37.4 °C)] 99.4 °F (37.4 °C)  Pulse:  [] 91  Resp:  [18-22] 20  SpO2:  [82 %-99 %] 99 %  BP: ()/(54-64) 104/64     Weight: 89.1 kg (196 lb 6.9 oz)  Body mass index is 28.18 kg/m².    Intake/Output Summary (Last 24 hours) at 10/1/2022 1002  Last data filed at 10/1/2022 0838  Gross per 24 hour   Intake 401.88 ml   Output 850 ml   Net -448.12 ml      Physical Exam  Vitals and nursing note reviewed.   Constitutional:       General: He is not in acute distress.     Appearance: Normal appearance. He is normal weight. He is not ill-appearing, toxic-appearing or diaphoretic.   Eyes:      Conjunctiva/sclera: Conjunctivae normal.   Cardiovascular:      Rate and Rhythm: Normal rate and regular rhythm.      Heart sounds:     No gallop.   Pulmonary:      Effort: Pulmonary effort is normal. No respiratory distress.   Skin:     General: Skin is warm and dry.   Neurological:      Mental Status: He is alert and oriented to person, place, and time.   Psychiatric:         Mood and Affect: Mood normal.         Behavior: Behavior normal.         Thought Content: Thought  content normal.       Significant Labs: All pertinent labs within the past 24 hours have been reviewed.  BMP:   Recent Labs   Lab 09/30/22  1033 10/01/22  0600   GLU  --  127*   NA  --  132*   K  --  3.5   CL  --  99   CO2  --  24   BUN  --  39*   CREATININE  --  1.3   CALCIUM  --  8.4*   MG 1.7  --      CBC: No results for input(s): WBC, HGB, HCT, PLT in the last 48 hours.    Significant Imaging:

## 2022-10-01 NOTE — ASSESSMENT & PLAN NOTE
New Dx. Rates improved after metoprolol. Repeat echo. Switch to DOAC prior to discharge. Consider out patient CV if A-fib persists    10/1/22 Still with A-fib. Rates up some. Continue po metoprolol with IV as needed. EF 60% on echo  Hold other BP meds with borderline BP

## 2022-10-01 NOTE — HOSPITAL COURSE
10/1/22 A-fib . SBP low 100s    Echo 9/30/22  The left ventricle is normal in size with concentric hypertrophy and normal systolic function.  The estimated ejection fraction is 60%.  Normal left ventricular diastolic function.  Mild right ventricular enlargement with mildly reduced right ventricular systolic function.  Mild left atrial enlargement.  Mild right atrial enlargement.  Normal central venous pressure (3 mmHg).  The estimated PA systolic pressure is 40 mmHg.  Trivial posterior pericardial effusion.

## 2022-10-01 NOTE — NURSING
Pt running a-fib -120s getting as high as 130s. Dr. Ambriz and Dr. Green notified. Lopressor push ordered and being given by Charge RN.

## 2022-10-01 NOTE — ASSESSMENT & PLAN NOTE
Patient with Hypoxic Respiratory failure which is Acute on chronic.  he is on home oxygen at 2 LPM. Supplemental oxygen was provided and noted- Oxygen Concentration (%):  [40-44] 40.   Signs/symptoms of respiratory failure include- increased work of breathing and respiratory distress. Contributing diagnoses includes - CHF and COPD Labs and images were reviewed. Patient Has not had a recent ABG. Will treat underlying causes and adjust management of respiratory failure as follows- See plan for heart failure/COPD     Multifactorial including possible diastolic dysfunction/COPD/Pulmoanry hypertension as well as possible progression of lung CA. Still with high 02 requirements. Will consult pulmonary and palliative care     Pulmonary recs

## 2022-10-01 NOTE — ASSESSMENT & PLAN NOTE
Possible exacerbation given bronchiectasis, and lung cancer cause an increased disposition for infection.   - augmentin started at this time.  Recommend 5 days as long as he remains afebrile.  Given distorted architecture of lung, this will give some anaerobic coverage, if needed.  Day 2/5.  - continue nebulizer therapy q4hr PRN  - continue fluticasone furoate - vilanterol 100-25.  Continue home albuterol PRN.  - prednisone 40mg Qday x 5 days (day 4)  - sputum culture with normal maksim.

## 2022-10-02 NOTE — PT/OT/SLP EVAL
Physical Therapy Evaluation    Patient Name:  Kashif Iverson   MRN:  [id number]    Recommendations:     Discharge Recommendations:  home with home health and family assistance   Discharge Equipment Recommendations: none   Barriers to discharge: None    Assessment:     Kashif Iverson is a 61 y.o. male admitted with a medical diagnosis of Acute on chronic respiratory failure with hypoxia.  He presents with the following impairments/functional limitations:  weakness, impaired endurance, impaired functional mobility, gait instability, impaired balance, impaired cardiopulmonary response to activity.    Rehab Prognosis: Fair; patient would benefit from acute skilled PT services to address these deficits and reach maximum level of function.    Recent Surgery: * No surgery found *      Plan:     During this hospitalization, patient to be seen 2 x/week to address the identified rehab impairments via gait training, therapeutic activities, therapeutic exercises and progress toward the following goals:    Plan of Care Expires:  10/16/22    Subjective     Chief Complaint: N/A  Patient/Family Comments/goals: Pt agreeable to therapy.  Pain/Comfort:  Pain Rating 1: 0/10      Living Environment:  Pt lives with spouse and 2 sons in a [facility] with no concerns.   Prior to admission, patients level of function was mod I with ambulation using SPC/home O2 PRN.  Equipment at home: cane, straight, oxygen.  Upon discharge, patient will have assistance from family.    Objective:     Patient found HOB elevated with oxygen, peripheral IV, telemetry  upon PT entry to room.    General Precautions: Standard, fall, respiratory   Orthopedic Precautions:N/A   Braces: N/A  Respiratory Status: Nasal cannula, flow 6 L/min    Exams:  Cognitive Exam:  Patient was able to follow 2 step commands.    Gross Motor Coordination:  WFL  Postural Exam:  Patient presented with the following abnormalities:    -       Rounded shoulders  -       Forward head  Skin  Integrity/Edema:      -       Skin integrity: Visible skin intact  -       Edema: None noted BLE  BLE ROM: WFL  BLE Strength: WFL    Functional Mobility:  Bed Mobility:     Scooting: supervision  Supine to Sit: supervision with HOB elevated   Transfers:     Sit to Stand:  stand by assistance with no AD  Bed to Chair: stand by assistance with  no AD  using  Step Transfer  Gait: Pt ambulated ~250 ft with CGA-SBA using no AD and on 6L O2 NC, spO2 ~93% and HR ~117 bpm.  Pt with mild unsteady gait, decreased step length, and decreased juan.   Balance: Pt with fair dynamic standing balance.     Therapeutic Activities and Exercises:  Pt educated on acute skilled PT services and goals.  Pt verbalized good understanding.     AM-PAC 6 CLICK MOBILITY  Total Score:21     Patient left up in chair reclined with all lines intact, call button in reach, and nurse Ct notified.  Tray table close by.    GOALS:   Multidisciplinary Problems       Physical Therapy Goals          Problem: Physical Therapy    Goal Priority Disciplines Outcome Goal Variances Interventions   Physical Therapy Goal     PT, PT/OT      Description: Goals to be met by: 10/16/22     Patient will increase functional independence with mobility by performin. Supine to sit with Modified [place]  2. Rolling to Left and Right with Modified [place]  3. Sit to stand transfer with Modified [place]  4. Bed to chair transfer with Modified [place]   5. Gait >500 feet with Modified [place] using O2  6. Lower extremity exercise program 2 sets x15 reps per handout, with independence                         History:     Past Medical History:   Diagnosis Date    Combined systolic and diastolic heart failure     Hypertension     Lung cancer     NSCLC    Lung mass     left lung       Past Surgical History:   Procedure Laterality Date    BRONCHOSCOPY N/A 2019    Procedure: Bronchoscopy;  [facility];  Location: [facility]  OR 2ND FLR;  Service: Anesthesiology;  Laterality: N/A;    CORONARY ANGIOGRAPHY N/A 3/4/2022    Procedure: ANGIOGRAM, CORONARY ARTERY;  Surgeon: Robson Green MD;  Location: [facility] CATH LAB;  Service: Cardiology;  Laterality: N/A;       Time Tracking:     PT Received On: 10/02/22  PT Start Time: 1458     PT Stop Time: 1514  PT Total Time (min): 16 min     Billable Minutes: Evaluation 16 min      10/02/2022

## 2022-10-02 NOTE — PT/OT/SLP EVAL
Occupational Therapy   Evaluation    Name: Kashif Iverson  MRN: [id number]  Admitting Diagnosis:  Acute on chronic respiratory failure with hypoxia  Recent Surgery: * No surgery found *      Recommendations:     Discharge Recommendations: home with home health  Discharge Equipment Recommendations:  none  Barriers to discharge:  None    Assessment:     Kashif Iverson is a 61 y.o. male with a medical diagnosis of Acute on chronic respiratory failure with hypoxia.   Performance deficits affecting function: weakness, impaired endurance, impaired self care skills, impaired functional mobility, gait instability, impaired balance, impaired cardiopulmonary response to activity.    The patient participated in the OT eval with mild SOB while on 6L NC with SpO2 of 93-94%, . The patient will benefit from OT to address functional deficits.      Rehab Prognosis: Good; patient would benefit from acute skilled OT services to address these deficits and reach maximum level of function.       Plan:     Patient to be seen 3 x/week to address the above listed problems via self-care/home management, therapeutic activities, therapeutic exercises  Plan of Care Expires: 10/16/22  Plan of Care Reviewed with: patient    Subjective     Chief Complaint: none  Patient/Family Comments/goals: agreeable to sit in the chair    Occupational Profile:  Living Environment: lives with spouse and 2 sons in a [place] house with no concerns  Previous level of function: Mod I amb and able to perform self care. The pateint has a cane that is borrowed but does not use  Roles and Routines: per chart, the patient. The patient reports not being able to go outside 2* heat. The patient's spouse works.  Equipment Used at Home:  cane, straight, oxygen  Assistance upon Discharge: spouse and sons    Pain/Comfort:  Pain Rating 1: 0/10    Patients cultural, spiritual, Yarsani conflicts given the current situation: no    Objective:      Patient found HOB elevated with oxygen,  pulse ox (continuous), telemetry upon OT entry to room.    General Precautions: Standard, fall, respiratory   Orthopedic Precautions:N/A   Braces: N/A  Respiratory Status: High flow, flow 6 L/min    Occupational Performance:    Bed Mobility:    Patient completed Scooting/Bridging with stand by assistance  Patient completed Supine to Sit with stand by assistance    Functional Mobility/Transfers:  Patient completed Sit <> Stand Transfer with stand by assistance and contact guard assistance  with  hand-held assist   Functional Mobility: The patient amb in the hallway with PT, no AD with SBA/CGA with SpO2 93-94%,     Activities of Daily Living:  Upper Body Dressing: contact guard assistance to don back gown  Lower Body Dressing: stand by assistance to karina B socks in the bed    Cognitive/Visual Perceptual:  Cognitive/Psychosocial Skills:     -       Oriented to: Person, Place, and Situation   -       Follows Commands/attention:Follows two-step commands  -       Communication: clear/fluent  -       Memory: No Deficits noted  -       Safety awareness/insight to disability: impaired   -       Mood/Affect/Coping skills/emotional control: Appropriate to situation    Physical Exam:  Balance: -       good  Postural examination/scapula alignment:    -       No postural abnormalities identified  Skin integrity: Visible skin intact  Edema:  None noted  Upper Extremity Range of Motion:     -       Right Upper Extremity: WFL  -       Left Upper Extremity: WFL  Upper Extremity Strength:    -       Right Upper Extremity: WFL  -       Left Upper Extremity: WFL    AMPAC 6 Click ADL:  AMPAC Total Score: 23    Treatment & Education:  Participated in OT eval  Encouraged the patient to sit in the chair as tolerated. Educated the patient re: recline of the chair.    Patient left up in chair with all lines intact and call button in reach    GOALS:   Multidisciplinary Problems       Occupational Therapy Goals          Problem:  Occupational Therapy    Goal Priority Disciplines Outcome Interventions   Occupational Therapy Goal     OT, PT/OT Ongoing, Progressing    Description: Goals to be met by: 10/16/22    Patient will increase functional independence with ADLs by performing:    UE Dressing with Modified [place].  LE Dressing with Modified [place].  Grooming while standing at sink with Modified [place].  Toileting from toilet with Modified [place] for hygiene and clothing management.   Stand pivot transfers with Modified [place].  Step transfer with Modified [place]  Toilet transfer to toilet with Modified [place].  Upper extremity exercise program x15 reps per handout, with independence.                         History:     Past Medical History:   Diagnosis Date    Combined systolic and diastolic heart failure     Hypertension     Lung cancer     NSCLC    Lung mass     left lung         Past Surgical History:   Procedure Laterality Date    BRONCHOSCOPY N/A 8/30/2019    Procedure: Bronchoscopy;  Surgeon: [facility] Diagnostic Provider;  Location: [address];  Service: Anesthesiology;  Laterality: N/A;    CORONARY ANGIOGRAPHY N/A 3/4/2022    Procedure: ANGIOGRAM, CORONARY ARTERY;  Surgeon: Robson Green MD;  Location: [facility] CATH LAB;  Service: Cardiology;  Laterality: N/A;       Time Tracking:     OT Date of Treatment: 10/02/22  OT Start Time: 1459  OT Stop Time: 1514  OT Total Time (min): 15 min    Billable Minutes:Evaluation 15 (with PT)    10/2/2022

## 2022-10-02 NOTE — SUBJECTIVE & OBJECTIVE
Interval History:  No new issues     Review of Systems   Constitutional:  Negative for activity change.   HENT:  Negative for congestion and dental problem.    Eyes:  Negative for discharge and itching.   Respiratory:  Negative for chest tightness and shortness of breath.    Cardiovascular:  Negative for chest pain.   Gastrointestinal:  Negative for abdominal pain.   Genitourinary:  Negative for difficulty urinating and dysuria.   Musculoskeletal:  Negative for arthralgias.   Neurological:  Negative for dizziness and facial asymmetry.   Psychiatric/Behavioral:  Negative for agitation and behavioral problems.    Objective:     Vital Signs (Most Recent):  Temp: 98.3 °F (36.8 °C) (10/02/22 0501)  Pulse: 102 (10/02/22 0501)  Resp: 18 (10/02/22 0501)  BP: (!) 131/54 (10/02/22 0501)  SpO2: 98 % (10/02/22 0501)   Vital Signs (24h Range):  Temp:  [98.2 °F (36.8 °C)-99.6 °F (37.6 °C)] 98.3 °F (36.8 °C)  Pulse:  [] 102  Resp:  [18-26] 18  SpO2:  [82 %-99 %] 98 %  BP: ()/(54-87) 131/54     Weight: 89.1 kg (196 lb 6.9 oz)  Body mass index is 28.18 kg/m².    Intake/Output Summary (Last 24 hours) at 10/2/2022 0620  Last data filed at 10/1/2022 2139  Gross per 24 hour   Intake 996.66 ml   Output 2500 ml   Net -1503.34 ml      Physical Exam  Vitals and nursing note reviewed.   Constitutional:       General: He is not in acute distress.     Appearance: Normal appearance. He is normal weight. He is not ill-appearing, toxic-appearing or diaphoretic.   Eyes:      Conjunctiva/sclera: Conjunctivae normal.   Cardiovascular:      Rate and Rhythm: Normal rate and regular rhythm.      Heart sounds:     No gallop.   Pulmonary:      Effort: Pulmonary effort is normal. No respiratory distress.   Skin:     General: Skin is warm and dry.   Neurological:      Mental Status: He is alert and oriented to person, place, and time.   Psychiatric:         Mood and Affect: Mood normal.         Behavior: Behavior normal.         Thought  Content: Thought content normal.       Significant Labs: All pertinent labs within the past 24 hours have been reviewed.  BMP:   Recent Labs   Lab 09/30/22  1033 10/01/22  0600   GLU  --  127*   NA  --  132*   K  --  3.5   CL  --  99   CO2  --  24   BUN  --  39*   CREATININE  --  1.3   CALCIUM  --  8.4*   MG 1.7  --      CBC: No results for input(s): WBC, HGB, HCT, PLT in the last 48 hours.    Significant Imaging:

## 2022-10-02 NOTE — PLAN OF CARE
Problem: Occupational Therapy  Goal: Occupational Therapy Goal  Description: Goals to be met by: 10/16/22    Patient will increase functional independence with ADLs by performing:    UE Dressing with Modified [place].  LE Dressing with Modified [place].  Grooming while standing at sink with Modified [place].  Toileting from toilet with Modified [place] for hygiene and clothing management.   Stand pivot transfers with Modified [place].  Step transfer with Modified [place]  Toilet transfer to toilet with Modified [place].  Upper extremity exercise program x15 reps per handout, with independence.    Outcome: Ongoing, Progressing     The patient will benefit from OT to address functional deficits.

## 2022-10-02 NOTE — ASSESSMENT & PLAN NOTE
Patient with Paroxysmal (<7 days) atrial fibrillation which is controlled currently with Beta Blocker. Patient is currently in atrial fibrillation.[id number] Score: 1. HASBLED Score: Unable to calculate. Anticoagulation indicated. Anticoagulation done with Eliquis.

## 2022-10-02 NOTE — PROGRESS NOTES
Pharmacokinetic Assessment Follow Up: IV Vancomycin    Vancomycin serum concentration assessment(s):    The trough level was drawn correctly and can be used to guide therapy at this time. The measurement is above the desired definitive target range of 10 to 20 mcg/mL.    Vancomycin Regimen Plan:    Discontinue the scheduled vancomycin regimen and re-dose when the random level is less than 20 mcg/mL, next level to be drawn at 0400 on 10/2/22.    Drug levels (last 3 results):  Recent Labs   Lab Result Units 09/30/22  1033 10/01/22  2139   Vancomycin-Trough ug/mL 22.8* 26.4*       Pharmacy will continue to follow and monitor vancomycin.    Please contact pharmacy at extension [phone] for questions regarding this assessment.    Thank you for the consult,   Marlon Cervantes       Patient brief summary:  Kashif Iverson is a 61 y.o. male initiated on antimicrobial therapy with IV Vancomycin for treatment of skin & soft tissue infection    The patient's current regimen is random pulse dosing    Drug Allergies:   Review of patient's allergies indicates:  No Known Allergies    Actual Body Weight:   89.1 kg    Renal Function:   Estimated Creatinine Clearance: 67 mL/min (based on SCr of 1.3 mg/dL).,     Dialysis Method (if applicable):  N/A    CBC (last 72 hours):  Recent Labs   Lab Result Units 09/29/22  0523   WBC K/uL 12.22   Hemoglobin g/dL 13.0*   Hematocrit % 38.4*   Platelets K/uL 151   Gran % % 97.4*   Lymph % % 1.3*   Mono % % 0.7*   Eosinophil % % 0.0   Basophil % % 0.1   Differential Method  Automated       Metabolic Panel (last 72 hours):  Recent Labs   Lab Result Units 09/29/22  0523 09/30/22  1033 10/01/22  0600   Sodium mmol/L 134*  --  132*   Potassium mmol/L 4.2  --  3.5   Chloride mmol/L 103  --  99   CO2 mmol/L 23  --  24   Glucose mg/dL 159*  --  127*   BUN mg/dL 15  --  39*   Creatinine mg/dL 0.9  --  1.3   Magnesium mg/dL 1.5* 1.7  --    Phosphorus mg/dL 2.8  --   --        Vancomycin  Administrations:  vancomycin given in the last 96 hours                     vancomycin in dextrose 5 % 1 gram/250 mL IVPB 1,000 mg ()  Restarted 10/01/22 1254      Restarted  1220      Restarted  1159     1,000 mg New Bag  1140     1,000 mg New Bag 09/30/22 2315    vancomycin 1.25 g in dextrose 5% 250 mL IVPB (ready to mix) (mg) 1,250 mg New Bag 09/30/22 1118      Restarted 09/29/22 2309     1,250 mg New Bag  2243     1,250 mg New Bag  1133    vancomycin (VANCOCIN) 1,750 mg in dextrose 5 % 500 mL IVPB (mg) 1,750 mg New Bag 09/28/22 2310                    Microbiologic Results:  Microbiology Results (last 7 days)       Procedure Component Value Units Date/Time    Blood Culture #1 **CANNOT BE ORDERED STAT** [[id number]] Collected: 09/28/22 1641    Order Status: Completed Specimen: Blood from Peripheral, Hand, Left Updated: 10/01/22 1903     Blood Culture, Routine No Growth to date      No Growth to date      No Growth to date      No Growth to date    Blood Culture #2 **CANNOT BE ORDERED STAT** [[id number]] Collected: 09/28/22 1641    Order Status: Completed Specimen: Blood from Peripheral, Antecubital, Right Updated: 10/01/22 1903     Blood Culture, Routine No Growth to date      No Growth to date      No Growth to date      No Growth to date    Culture, Respiratory with Gram Stain [[id number]] Collected: 09/30/22 1119    Order Status: Completed Specimen: Respiratory from Sputum, Expectorated Updated: 10/01/22 0917     Respiratory Culture Normal respiratory maksim     Gram Stain (Respiratory) <10 epithelial cells per low power field.     Gram Stain (Respiratory) Moderate Gram positive cocci in clusters     Gram Stain (Respiratory) Few Gram positive cocci in pairs and chains     Gram Stain (Respiratory) Moderate Gram negative rods     Gram Stain (Respiratory) Few WBC's

## 2022-10-02 NOTE — PROGRESS NOTES
Pharmacokinetic Assessment Follow Up: IV Vancomycin    Vancomycin serum concentration assessment(s):    The random level was drawn correctly and can be used to guide therapy at this time. The measurement is above the desired definitive target range of 10 to 20 mcg/mL.    Vancomycin Regimen Plan:    Re-dose when the random level is less than 20 mcg/mL, next level to be drawn at 0400 on 10-03-22    Drug levels (last 3 results):  Recent Labs   Lab Result Units 09/30/22  1033 10/01/22  2139 10/02/22  0552   Vancomycin, Random ug/mL  --   --  20.5   Vancomycin-Trough ug/mL 22.8* 26.4*  --        Pharmacy will continue to follow and monitor vancomycin.    Please contact pharmacy at extension [phone] for questions regarding this assessment.    Thank you for the consult,   Tiff Cornell       Patient brief summary:  Kashif Iverson is a 61 y.o. male initiated on antimicrobial therapy with IV Vancomycin for treatment of skin & soft tissue infection    The patient's current regimen is PULSE dosed based on daily random vanc level    Drug Allergies:   Review of patient's allergies indicates:  No Known Allergies    Actual Body Weight:   89.1 kg    Renal Function:   Estimated Creatinine Clearance: 67 mL/min (based on SCr of 1.3 mg/dL).,     Dialysis Method (if applicable):  N/A    CBC (last 72 hours):  No results for input(s): WHITE BLOOD CELL COUNT, HEMOGLOBIN, HEMATOCRIT, PLATELETS, GRAN%, LYMPH%, MONO%, EOSINOPHIL%, BASOPHIL%, DIFFERENTIAL METHOD in the last 72 hours.    Metabolic Panel (last 72 hours):  Recent Labs   Lab Result Units 09/30/22  1033 10/01/22  0600   Sodium mmol/L  --  132*   Potassium mmol/L  --  3.5   Chloride mmol/L  --  99   CO2 mmol/L  --  24   Glucose mg/dL  --  127*   BUN mg/dL  --  39*   Creatinine mg/dL  --  1.3   Magnesium mg/dL 1.7  --        Vancomycin Administrations:  vancomycin given in the last 96 hours                     vancomycin in dextrose 5 % 1 gram/250 mL IVPB 1,000 mg ()  Restarted  10/01/22 1254      Restarted  1220      Restarted  1159     1,000 mg New Bag  1140     1,000 mg New Bag 09/30/22 2315    vancomycin 1.25 g in dextrose 5% 250 mL IVPB (ready to mix) (mg) 1,250 mg New Bag 09/30/22 1118      Restarted 09/29/22 2309     1,250 mg New Bag  2243     1,250 mg New Bag  1133    vancomycin (VANCOCIN) 1,750 mg in dextrose 5 % 500 mL IVPB (mg) 1,750 mg New Bag 09/28/22 2310                    Microbiologic Results:  Microbiology Results (last 7 days)       Procedure Component Value Units Date/Time    Culture, Respiratory with Gram Stain [[id number]] Collected: 09/30/22 1119    Order Status: Completed Specimen: Respiratory from Sputum, Expectorated Updated: 10/02/22 0648     Respiratory Culture Normal respiratory maksim     Gram Stain (Respiratory) <10 epithelial cells per low power field.     Gram Stain (Respiratory) Moderate Gram positive cocci in clusters     Gram Stain (Respiratory) Few Gram positive cocci in pairs and chains     Gram Stain (Respiratory) Moderate Gram negative rods     Gram Stain (Respiratory) Few WBC's    Blood Culture #1 **CANNOT BE ORDERED STAT** [[id number]] Collected: 09/28/22 1641    Order Status: Completed Specimen: Blood from Peripheral, Hand, Left Updated: 10/01/22 1903     Blood Culture, Routine No Growth to date      No Growth to date      No Growth to date      No Growth to date    Blood Culture #2 **CANNOT BE ORDERED STAT** [[id number]] Collected: 09/28/22 1641    Order Status: Completed Specimen: Blood from Peripheral, Antecubital, Right Updated: 10/01/22 1903     Blood Culture, Routine No Growth to date      No Growth to date      No Growth to date      No Growth to date

## 2022-10-02 NOTE — NURSING
[facility] MEWS TRIGGER FOLLOW UP       MEWS Monitoring, Score is: 3  Indication for review: HR    Bedside Nurse, Bel, contacted, no concerns verbalized at this time, instructed to call [phone] for further concerns or assistance.    Afib on cardiac monitor HR 90s-100s. One time dose of IV lopressor was given on day shift for increased HR with relief obtained. Pt resting in bed in NAD. Stated felt like he was getting better. On 4L NC. Breathing unlabored. Will monitor.

## 2022-10-02 NOTE — PROGRESS NOTES
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     The patient developed worsening shortness of breath one day prior to presentation. The patient reports that his SOB became worse because he was in his yard and was bothered by the heat. His episode was associated with worsening hypoxia documented at home (SpO2: 88%, on 3L, with occasional drops to the high 70s). His wife called the oncology clinic and they were instructed to present to the ED for further management. The patient denies having worsening cough and reports feeling fine at the time of examination. He denied having any chest pain but did report having worsening lower extremity edema. He is compliant with his medications but no longer taking Lasix. No fevers or chills. No urinary or GI symptoms. While in the ER, the patient was saturating 92% on 6L of supplemental O2, but otherwise was hemodynamically stable. Labs showed no leukocytosis (11.1), normocytic anemia (12.5), elevated BNP (447), negative lactate and procalcitonin (0.15). CTA chest showed no evidence of acute pulmonary thromboembolism, and a decrease in right pleural fluid collection with stable consolidated changes in the right and left lungs. He was administered Duo-Nebs, aspirin 325 mg and ceftriaxone. He was admitted for further management.       Overview/Hospital Course:  Patient was admitted to the  hospital for SOB- due to possible CHF/COPD/progression of lung CA and pulmonary hypertension.  Started on treatment.  Pulmonary consulted. Palliative care was consulted as well. Patient discovered to have A-fib with RVR. Cards consulted. PT/OT consulted as well.      Interval History:  No new issues     Review of Systems   Constitutional:  Negative for activity change.   HENT:  Negative for congestion and dental problem.    Eyes:  Negative for discharge and itching.   Respiratory:  Negative for chest tightness and shortness of breath.    Cardiovascular:  Negative for chest pain.   Gastrointestinal:  Negative for abdominal pain.   Genitourinary:  Negative for difficulty urinating and dysuria.   Musculoskeletal:  Negative for arthralgias.   Neurological:  Negative for dizziness and facial asymmetry.   Psychiatric/Behavioral:  Negative for agitation and behavioral problems.    Objective:     Vital Signs (Most Recent):  Temp: 98.3 °F (36.8 °C) (10/02/22 0501)  Pulse: 102 (10/02/22 0501)  Resp: 18 (10/02/22 0501)  BP: (!) 131/54 (10/02/22 0501)  SpO2: 98 % (10/02/22 0501)   Vital Signs (24h Range):  Temp:  [98.2 °F (36.8 °C)-99.6 °F (37.6 °C)] 98.3 °F (36.8 °C)  Pulse:  [] 102  Resp:  [18-26] 18  SpO2:  [82 %-99 %] 98 %  BP: ()/(54-87) 131/54     Weight: 89.1 kg (196 lb 6.9 oz)  Body mass index is 28.18 kg/m².    Intake/Output Summary (Last 24 hours) at 10/2/2022 0620  Last data filed at 10/1/2022 2139  Gross per 24 hour   Intake 996.66 ml   Output 2500 ml   Net -1503.34 ml      Physical Exam  Vitals and nursing note reviewed.   Constitutional:       General: He is not in acute distress.     Appearance: Normal appearance. He is normal weight. He is not ill-appearing, toxic-appearing or diaphoretic.   Eyes:      Conjunctiva/sclera: Conjunctivae normal.   Cardiovascular:      Rate and Rhythm: Normal rate and regular rhythm.      Heart sounds:     No gallop.   Pulmonary:      Effort: Pulmonary effort is normal.  No respiratory distress.   Skin:     General: Skin is warm and dry.   Neurological:      Mental Status: He is alert and oriented to person, place, and time.   Psychiatric:         Mood and Affect: Mood normal.         Behavior: Behavior normal.         Thought Content: Thought content normal.       Significant Labs: All pertinent labs within the past 24 hours have been reviewed.  BMP:   Recent Labs   Lab 09/30/22  1033 10/01/22  0600   GLU  --  127*   NA  --  132*   K  --  3.5   CL  --  99   CO2  --  24   BUN  --  39*   CREATININE  --  1.3   CALCIUM  --  8.4*   MG 1.7  --      CBC: No results for input(s): WBC, HGB, HCT, PLT in the last 48 hours.    Significant Imaging:       Assessment/Plan:      * Acute on chronic respiratory failure with hypoxia  Patient with Hypoxic Respiratory failure which is Acute on chronic.  he is on home oxygen at 2 LPM. Supplemental oxygen was provided and noted-  .   Signs/symptoms of respiratory failure include- increased work of breathing and respiratory distress. Contributing diagnoses includes - CHF and COPD Labs and images were reviewed. Patient Has not had a recent ABG. Will treat underlying causes and adjust management of respiratory failure as follows- See plan for heart failure/COPD     Multifactorial including possible diastolic dysfunction/COPD/Pulmoanry hypertension as well as possible progression of lung CA. Still with high 02 requirements. Will consult pulmonary and palliative care     Pulmonary recs- thinks secondary to A-fib with RVR.  Now rate controlled. Cards consulted.     Persistent atrial fibrillation  Patient with Paroxysmal (<7 days) atrial fibrillation which is controlled currently with Beta Blocker. Patient is currently in atrial fibrillation.[id number] Score: 1. HASBLED Score: Unable to calculate. Anticoagulation indicated. Anticoagulation done with Eliquis.        Left leg cellulitis  - Severiano reports having AMADEO (L>R)   - Erythema and warmth noted on physical  examination  - Likely related to underlying cellulitis     Plan:   - Vancomycin, pharmacy to dose  - Monitor progression/resolution       COPD (chronic obstructive pulmonary disease)  - History of COPD on supplemental O2   - Presented with worsening SOB   - Likely related to a COPD flare (in the setting of recent weather changes)     Plan:   - Prednisone 40 mg x 5 days   - Continue home inhalers   - Duo-Nebs scheduled  - Does not meet criteria for antibiotics     Coronary artery disease involving native coronary artery of native heart without angina pectoris  - Continue home medications (aspirin, Coreg, statin and Plavix)     PAD (peripheral artery disease)  - Resume home medications (aspirin, statin and cilostazol)   - Follow up outpatient with cardiology     Chronic combined systolic and diastolic heart failure  - Echocardiogram (2/22/2022): EF: 35%-40%, GIDD, normal RV systolic function, mild TR   - Echocardiogram (5/18/2022): EF: 65%, indeterminate LV diastolic function, mild TR, PA pressure of 54 mmHg  - Patient presented with worsening SOB and AMADEO   - Elevated BNP, compared to most recent readings   - Reports that he no longer takes his Lasix   - Suspect that he is in exacerbation     Plan:   - Lasix 40 mg IV BID   - Resume home medications     Increase lasix     Essential hypertension  - Resume home medications (amlodipine 10 mg, Coreg 6.25 mg BID, Losartan 100 mg)     Lung cancer, main bronchus, left  - On maintenance chemotherapy and radiation   - Outpatient follow up with oncology       Debility- PT/OT consulted     VTE Risk Mitigation (From admission, onward)         Ordered     heparin (porcine) injection 5,000 Units  Every 8 hours         09/28/22 2203     IP VTE HIGH RISK PATIENT  Once         09/28/22 2203     Place sequential compression device  Until discontinued         09/28/22 2203                Discharge Planning   AZ:      Code Status: Full Code   Is the patient medically ready for  discharge?:     Reason for patient still in hospital (select all that apply): Patient unstable  Discharge Plan A: Home Health                  Carroll Syed MD  Department of Hospital Medicine   [facility]

## 2022-10-02 NOTE — NURSING
Patient's o2 sat low on monitor. Patient ambulating to bathroom without oxygen on. Patient ambulated back to bed. Patient educated on importance of leaving oxygen on at all times. 02 at 98 percent once oxygen placed back on.

## 2022-10-02 NOTE — ASSESSMENT & PLAN NOTE
Patient with Hypoxic Respiratory failure which is Acute on chronic.  he is on home oxygen at 2 LPM. Supplemental oxygen was provided and noted-  .   Signs/symptoms of respiratory failure include- increased work of breathing and respiratory distress. Contributing diagnoses includes - CHF and COPD Labs and images were reviewed. Patient Has not had a recent ABG. Will treat underlying causes and adjust management of respiratory failure as follows- See plan for heart failure/COPD     Multifactorial including possible diastolic dysfunction/COPD/Pulmoanry hypertension as well as possible progression of lung CA. Still with high 02 requirements. Will consult pulmonary and palliative care     Pulmonary recs- thinks secondary to A-fib with RVR.  Now rate controlled. Cards consulted.

## 2022-10-02 NOTE — PLAN OF CARE
Patient alert and oriented x4. Respirations even and unlabored. Sob on exertion. 02 at 6l via high flow nasal cannula. Skin warm and dry. Scattered bruising noted to skin. Iv saline locked. No complications noted. Patient denies pain. Pain medication available as needed. Patient refuses scds. Patient has no complaints at this time. Instructed to call for any needs. Bed in lowest position. Call bell within reach. Bed alarms audible.     Problem: Adult Inpatient Plan of Care  Goal: Plan of Care Review  Outcome: Ongoing, Progressing  Goal: Patient-Specific Goal (Individualized)  Outcome: Ongoing, Progressing  Goal: Absence of Hospital-Acquired Illness or Injury  Outcome: Ongoing, Progressing  Goal: Optimal Comfort and Wellbeing  Outcome: Ongoing, Progressing

## 2022-10-03 PROBLEM — Z71.89 ACP (ADVANCE CARE PLANNING): Status: ACTIVE | Noted: 2022-01-01

## 2022-10-03 PROBLEM — R53.81 DEBILITY: Status: ACTIVE | Noted: 2022-01-01

## 2022-10-03 NOTE — PROGRESS NOTES
Pharmacokinetic Assessment Follow Up: IV Vancomycin    Vancomycin serum concentration assessment(s):    The random level was drawn correctly and can be used to guide therapy at this time. The measurement is within the desired definitive target range of 10 to 20 mcg/mL.    Vancomycin Regimen Plan:    Vancomycin 1250mg dose scheduled.    Re-dose when the random level is less than 20 mcg/mL, next level to be drawn at 0400 on 10/4/22    Drug levels (last 3 results):  Recent Labs   Lab Result Units 09/30/22  1033 10/01/22  2139 10/02/22  0552 10/03/22  0411   Vancomycin, Random ug/mL  --   --  20.5 10.7   Vancomycin-Trough ug/mL 22.8* 26.4*  --   --        Pharmacy will continue to follow and monitor vancomycin.    Please contact pharmacy at extension [phone] for questions regarding this assessment.    Thank you for the consult,   Marlon Cervantes       Patient brief summary:  Kashif Iverson is a 61 y.o. male initiated on antimicrobial therapy with IV Vancomycin for treatment of skin & soft tissue infection    The patient's current regimen is random pulse dosing    Drug Allergies:   Review of patient's allergies indicates:  No Known Allergies    Actual Body Weight:   89.1 kg    Renal Function:   Estimated Creatinine Clearance: 72.6 mL/min (based on SCr of 1.2 mg/dL).,     Dialysis Method (if applicable):  N/A    CBC (last 72 hours):  No results for input(s): WHITE BLOOD CELL COUNT, HEMOGLOBIN, HEMATOCRIT, PLATELETS, GRAN%, LYMPH%, MONO%, EOSINOPHIL%, BASOPHIL%, DIFFERENTIAL METHOD in the last 72 hours.    Metabolic Panel (last 72 hours):  Recent Labs   Lab Result Units 09/30/22  1033 10/01/22  0600 10/03/22  0411   Sodium mmol/L  --  132* 136   Potassium mmol/L  --  3.5 3.5   Chloride mmol/L  --  99 100   CO2 mmol/L  --  24 24   Glucose mg/dL  --  127* 209*   BUN mg/dL  --  39* 40*   Creatinine mg/dL  --  1.3 1.2   Magnesium mg/dL 1.7  --   --        Vancomycin Administrations:  vancomycin given in the last 96 hours                      vancomycin in dextrose 5 % 1 gram/250 mL IVPB 1,000 mg ()  Restarted 10/01/22 1254      Restarted  1220      Restarted  1159     1,000 mg New Bag  1140     1,000 mg New Bag 09/30/22 2315    vancomycin 1.25 g in dextrose 5% 250 mL IVPB (ready to mix) (mg) 1,250 mg New Bag 09/30/22 1118      Restarted 09/29/22 2309     1,250 mg New Bag  2243     1,250 mg New Bag  1133                    Microbiologic Results:  Microbiology Results (last 7 days)       Procedure Component Value Units Date/Time    Blood Culture #2 **CANNOT BE ORDERED STAT** [[id number]] Collected: 09/28/22 1641    Order Status: Completed Specimen: Blood from Peripheral, Antecubital, Right Updated: 10/02/22 1903     Blood Culture, Routine No Growth after 4 days.    Blood Culture #1 **CANNOT BE ORDERED STAT** [[id number]] Collected: 09/28/22 1641    Order Status: Completed Specimen: Blood from Peripheral, Hand, Left Updated: 10/02/22 1903     Blood Culture, Routine No Growth after 4 days.    Culture, Respiratory with Gram Stain [[id number]] Collected: 09/30/22 1119    Order Status: Completed Specimen: Respiratory from Sputum, Expectorated Updated: 10/02/22 0648     Respiratory Culture Normal respiratory maksim     Gram Stain (Respiratory) <10 epithelial cells per low power field.     Gram Stain (Respiratory) Moderate Gram positive cocci in clusters     Gram Stain (Respiratory) Few Gram positive cocci in pairs and chains     Gram Stain (Respiratory) Moderate Gram negative rods     Gram Stain (Respiratory) Few WBC's

## 2022-10-03 NOTE — SUBJECTIVE & OBJECTIVE
Interval History:  No new issues     Review of Systems   Constitutional:  Negative for activity change.   HENT:  Negative for congestion and dental problem.    Eyes:  Negative for discharge and itching.   Respiratory:  Negative for chest tightness and shortness of breath.    Cardiovascular:  Negative for chest pain.   Gastrointestinal:  Negative for abdominal pain.   Genitourinary:  Negative for difficulty urinating and dysuria.   Musculoskeletal:  Negative for arthralgias.   Neurological:  Negative for dizziness and facial asymmetry.   Psychiatric/Behavioral:  Negative for agitation and behavioral problems.    Objective:     Vital Signs (Most Recent):  Temp: 97.7 °F (36.5 °C) (10/03/22 0719)  Pulse: (!) 54 (10/03/22 0754)  Resp: 20 (10/03/22 0754)  BP: 128/78 (10/03/22 0719)  SpO2: 98 % (10/03/22 0754)   Vital Signs (24h Range):  Temp:  [97.7 °F (36.5 °C)-98.9 °F (37.2 °C)] 97.7 °F (36.5 °C)  Pulse:  [] 54  Resp:  [16-20] 20  SpO2:  [91 %-100 %] 98 %  BP: (103-128)/(64-81) 128/78     Weight: 90.8 kg (200 lb 2.8 oz)  Body mass index is 28.72 kg/m².    Intake/Output Summary (Last 24 hours) at 10/3/2022 0904  Last data filed at 10/3/2022 0525  Gross per 24 hour   Intake --   Output 600 ml   Net -600 ml      Physical Exam  Vitals and nursing note reviewed.   Constitutional:       General: He is not in acute distress.     Appearance: Normal appearance. He is normal weight. He is not ill-appearing, toxic-appearing or diaphoretic.   Eyes:      Conjunctiva/sclera: Conjunctivae normal.   Cardiovascular:      Rate and Rhythm: Normal rate and regular rhythm.      Heart sounds:     No gallop.   Pulmonary:      Effort: Pulmonary effort is normal. No respiratory distress.   Skin:     General: Skin is warm and dry.   Neurological:      Mental Status: He is alert and oriented to person, place, and time.   Psychiatric:         Mood and Affect: Mood normal.         Behavior: Behavior normal.         Thought Content: Thought  content normal.       Significant Labs: All pertinent labs within the past 24 hours have been reviewed.  BMP:   Recent Labs   Lab 10/03/22  0411   *      K 3.5      CO2 24   BUN 40*   CREATININE 1.2   CALCIUM 8.4*     CBC: No results for input(s): WBC, HGB, HCT, PLT in the last 48 hours.    Significant Imaging:

## 2022-10-03 NOTE — NURSING
"Patient's o2 dropping in 80s. Patient sitting up in bed eating candy with oxygen off. Patient states "I don't know how that came off". Patient has taken oxygen off multiple times throughout shift. Patient educated again at this time. On importance of keeping oxygen in. Patient verbalizes an understanding. Bed in lowest position. Bed alarms audible.   "

## 2022-10-03 NOTE — PLAN OF CARE
[facility]  Discharge Reassessment    Spoke with patient at bedside regarding discharge planning. Pt accepting to resume [facility] once medically stable for discharge. Updated clinicals sent to [facility] via care port to confirm eligibility to resume. No current dme needs. Ambulatory referral for out patient palliative care placed, per NP. [place] to continue to follow for dc needs.     Primary Care Provider: Guerline Higgins MD    Expected Discharge Date:     Reassessment (most recent)       Discharge Reassessment - 10/03/22 1608          Discharge Reassessment    Assessment Type Discharge Planning Reassessment     Did the patient's condition or plan change since previous assessment? No     Discharge Plan discussed with: Patient     Name(s) and Number(s) Natty Iverson Spouse     Communicated [place] with patient/caregiver Date not available/Unable to determine     Discharge Plan A Home Health     Discharge Plan B Home with family     DME Needed Upon Discharge  none     Discharge Barriers Identified None     Why the patient remains in the hospital Requires continued medical care        Post-Acute Status    Post-Acute Authorization Home Health     Home Health Status Pending medical clearance/testing     Discharge Delays None known at this time

## 2022-10-03 NOTE — PLAN OF CARE
Problem: Occupational Therapy  Goal: Occupational Therapy Goal  Description: Goals to be met by: 10/16/22    Patient will increase functional independence with ADLs by performing:    UE Dressing with Modified Walsh.  LE Dressing with Modified Walsh.  Grooming while standing at sink with Modified Walsh.  Toileting from toilet with Modified Walsh for hygiene and clothing management.   Stand pivot transfers with Modified Walsh.  Step transfer with Modified Walsh  Toilet transfer to toilet with Modified Walsh.  Upper extremity exercise program x15 reps per handout, with independence.    Outcome: Ongoing, Progressing   The patient perform UE therex using red theraband x10 reps with O2 sats 92-93% while on 4L NC.

## 2022-10-03 NOTE — PROGRESS NOTES
"[facility]  Palliative Medicine  Progress Note    Patient Name: Kashif Iverson  MRN: [id number]  Admission Date: 9/28/2022  Hospital Length of Stay: 5 days  Code Status: Full Code   Attending Provider: Carroll Ambriz MD  Consulting Provider: Janet Hawkins NP  Primary Care Physician: Guerline Higgins MD  Principal Problem:Acute on chronic respiratory failure with hypoxia    Patient information was obtained from patient, spouse/SO, past medical records, ER records and primary team.      Assessment/Plan:   Advance Care Planning   Palliative Encounter   Date: 10/03/2022  - interval chart reviewed in detail  - met with pt at bedside; reports overall improvement and improved dyspnea  - continued GOC/ACP discussion; pt maintains desire to continue with onc tx as OP as long as a candidate; discussed trajectory of multiple co-morbidities and at least 6 hospital admissions this year per EMR leading to my concern for home long pt would be able to continue with treatment; discussed palliative care as OP while continuing treatment, discussed philosophy of care; recommended OP Palliative care with either outpatient clinic or home palliative care; pt is interested in this and asked that I speak with his wife to see if she would prefer clinic or home as she usually manages all of his medical needs  - did appear to have less depressed mood/affect today compared to previous assessment; education provided regarding chronic disease associated depression; pt did confirm sometimes "gets down" about health, but again asked that I discuss further with his wife   - call placed to pt's wife, Natty, to discuss above; shares they have been  30+ years and she is having a really difficult time with pt's decline over the last year; discussed pt's baseline at home; pt is ambulatory but gets hypoxic easily/quickly with activity especially outside, she monitors pulse ox regularly; she did express concern of "delirium" including " disorientation, hallucinations, and paranoia since last week prior to admit which has increased since being in hospital (gave examples of pt calling her very worked up bc someone was trying to break into the window of his hospital room which is on the 4th floor, referencing being at work, not knowing where he was)  - wife wishes to follow up with palliative medicine in outpatient clinic; reports pt enjoys the outing of medical visits since activities are so limited currently due to illness; ambulatory palliative referral ordered   - emotional support provided to both patient and wife; allowed time for questions/concerns for both pt and wife; all addressed   - updated Dr. Ambriz (primary), Bettie, RN (/SW), and Dr. Higgins (Oncologist)    Palliative Consult   Date: 09/30/2022  - consult received   - interval chart reviewed in detail; pt known to palliative medicine team from previous admissions; previous ACP records from 2/2022 and 5/2022 reviewed (pt required intubation on both admits) discussed pt with Dr. Ambriz (primary) and Dr. Danielson ([facility])   - met with patient, at bedside; re-introduction to palliative medicine team and role in current care and admission   - learned more about pt outside of hospital and since previous admit; has been on home O2 at 2L; has been undergoing maintenance chemo and XRT without any reported difficulties and wishes to continue with oncology treatments as long as he remains a candidate; reports not taking lasix at home to his knowledge but is unsure as wife usually manages medications  - pt seems to have poor insight into chronic medical conditions and medications, which is consistent with previous palliative team assessments; COPD, CHF, and trajectory of both discussed and education provided to patient   - pt confirms continued desire for full code status; discusses previous admissions requiring intubation, and pt confirms if respiratory status were to require intubation in the  "future he would wish to be intubated, "I want to live"; confirmed wife, Natty, is legal MPOA and still who pt wishes to make medical decisions  if he lacked capacity in the future   - emotional support provided as pt seems very down about this admission "really thought I was doing better"; have asked nursing staff to assess for continued depressed mood over weekend, will plan to discuss symptoms further at next visit; pt confirms he has been visited by spiritual care team which "lifted his spirits"  - Allowed time for questions/concerns; all addressed; expressed availability of myself/palliative team for additional questions/concerns   - updated primary and     Acute on chronic respiratory failure with hypoxia  COPD (chronic obstructive pulmonary disease)  - PCCM consulted/following  - pt with history of resp failure requiring intubation during previous admits 2/22 and 5/22  - Hx of advanced lung cancer with bone mets and COPD requiring O2 at home    Lung cancer, main bronchus, left   - Pt of Dr. Hathaway (Oncology); OP maintenance chemotherapy and radiation, pt reports no problems or adverse reactions to current tx   - pt wishes to continue oncology treatment as long as he is a candidate  - history noted; management per primary and oncology      Chronic combined systolic and diastolic heart failure  - Echo 2/2022: EF: 35%-40%, mild TR; 5/2022: EF: 65%  - presented with worsening SOB and AMADEO   - pt reported no longer taking lasix, possible exacerbation     Coronary artery disease involving native coronary artery of native heart without angina pectoris  PAD (peripheral artery disease)  - history noted; management per primary     Left leg cellulitis  - noted; management per primary     Lung cancer, main bronchus, left  - Pt of Dr. Hathaway (Oncology); OP maintenance chemotherapy and radiation      Delirium   - 10/3 wife reports worsening delirium since admit with hallucinations, disorientation, and mild paranoia; reports this " "happened previously several months ago but had improved   - discussed with primary and OP oncologist     Thank you for your consult. I will follow-up with patient. Please contact us if you have any additional questions.    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      The patient developed worsening shortness of breath one day prior to presentation. The patient reports that his SOB became worse because he was in his yard and was bothered by the heat. His episode was associated with worsening hypoxia documented at home (SpO2: 88%, on 3L, with occasional drops to the high 70s). His wife called the oncology clinic and they were instructed to present to the ED for further management. The patient denies having worsening cough and reports feeling fine at the time of examination. He denied having any chest pain but did report having worsening lower extremity edema. He is compliant with his medications but no longer taking Lasix. No fevers or chills. No urinary or GI symptoms. While in the ER, the patient was saturating 92% on 6L of supplemental O2, but otherwise was hemodynamically stable. Labs showed no leukocytosis (11.1), normocytic anemia (12.5), elevated BNP (447), negative lactate and procalcitonin (0.15). CTA chest showed no evidence of acute pulmonary thromboembolism, and a decrease in right pleural fluid collection with stable consolidated changes in the right and left lungs. He was administered Duo-Nebs, aspirin 325 mg and ceftriaxone. He was admitted for further management. "    Palliative medicine consulted for advance care planning and continuity of care; Met with pt at bedside; for details of visit, see advance care planning section of plan. "           Hospital Course:  No notes on file    Past Medical History:   Diagnosis Date    Combined systolic and diastolic heart failure     Hypertension     Lung cancer     NSCLC    Lung mass     left lung     Past Surgical History:   Procedure Laterality Date    BRONCHOSCOPY N/A 2019    Procedure: Bronchoscopy;  Surgeon: Miguel Diagnostic Provider;  Location: [facility] OR [address] [place] [address];  Service: Anesthesiology;  Laterality: N/A;    CORONARY ANGIOGRAPHY N/A 3/4/2022    Procedure: ANGIOGRAM, CORONARY ARTERY;  Surgeon: Robson Green MD;  Location: [facility] CATH LAB;  Service: Cardiology;  Laterality: N/A;     Review of patient's allergies indicates:  No Known Allergies    Medications:  Continuous Infusions:  Scheduled Meds:   albuterol-ipratropium  3 mL Nebulization Q4H WAKE    amLODIPine  10 mg Oral Daily    amoxicillin-clavulanate 875-125mg  1 tablet Oral Q12H    aspirin  81 mg Oral Daily    atorvastatin  80 mg Oral Daily    carvediloL  6.25 mg Oral BID    cilostazoL  100 mg Oral BID    clopidogreL  75 mg Oral Daily    fluticasone furoate-vilanteroL  1 puff Inhalation Daily    furosemide (LASIX) injection  80 mg Intravenous Q12H    heparin (porcine)  5,000 Units Subcutaneous Q8H    losartan  100 mg Oral Daily    metoprolol succinate  50 mg Oral Daily    predniSONE  40 mg Oral Daily    vancomycin (VANCOCIN) IVPB  1,000 mg Intravenous Q12H     PRN Meds:acetaminophen, docusate sodium, glucagon (human recombinant), glucose, glucose, naloxone, sodium chloride 0.9%, Pharmacy to dose Vancomycin consult **AND** vancomycin - pharmacy to dose    Family History       Problem Relation (Age of Onset)    Cancer Father, Sister    Diabetes Mother    Heart defect Mother    No Known Problems Son          Tobacco Use    Smoking status: Former     Packs/day: 1.00     Years: 25.00     Pack years: 25.00     Types: Cigarettes     Quit date:      Years since quittin.7    Smokeless tobacco: Never   Substance and Sexual Activity    Alcohol  use: Yes     Comment: ocassionally    Drug use: Never    Sexual activity: Yes     Partners: Female     Review of Systems   Constitutional:  Positive for activity change and fatigue. Negative for chills and fever.        Reports feeling better overall today   HENT: Negative.     Respiratory:  Positive for cough and shortness of breath.    Cardiovascular:  Positive for leg swelling. Negative for chest pain.   Gastrointestinal: Negative.    Genitourinary: Negative.    Musculoskeletal: Negative.    Neurological: Negative.    Objective:     Vital Signs (Most Recent):  Temp: 99 °F (37.2 °C) (09/30/22 1112)  Pulse: 100 (09/30/22 1205)  Resp: (!) 21 (09/30/22 1150)  BP: 100/61 (09/30/22 1112)  SpO2: 95 % (09/30/22 1205)   Vital Signs (24h Range):  Temp:  [99 °F (37.2 °C)-100.1 °F (37.8 °C)] 99 °F (37.2 °C)  Pulse:  [] 100  Resp:  [18-22] 21  SpO2:  [90 %-95 %] 95 %  BP: (100-133)/(55-90) 100/61     Weight: 89.1 kg (196 lb 6.9 oz)  Body mass index is 28.18 kg/m².    Physical Exam  Vitals and nursing note reviewed.   Constitutional:       General: He is not in acute distress.     Appearance: He is normal weight. He is ill-appearing.   HENT:      Head: Normocephalic and atraumatic.      Nose: Nose normal.      Mouth/Throat:      Mouth: Mucous membranes are moist.   Pulmonary:      Effort: Pulmonary effort is normal.      Comments: NC in place   Musculoskeletal:      Right lower leg: Edema present.      Left lower leg: Edema present.   Neurological:      Mental Status: He is alert and oriented to person, place, and time.   Psychiatric:         Mood and Affect: Mood normal.         Thought Content: Thought content normal.     Significant Labs: All pertinent labs within the past 24 hours have been reviewed.  CBC:   Recent Labs   Lab 09/29/22  0523   WBC 12.22   HGB 13.0*   HCT 38.4*   MCV 92          BMP:  Recent Labs   Lab 09/30/22  1033   MG 1.7       LFT:  Lab Results   Component Value Date    AST 13 09/28/2022     ALKPHOS 88 09/28/2022    BILITOT 1.1 (H) 09/28/2022     Albumin:   Albumin   Date Value Ref Range Status   09/28/2022 3.5 3.5 - 5.2 g/dL Final     Protein:   Total Protein   Date Value Ref Range Status   09/28/2022 6.4 6.0 - 8.4 g/dL Final     Lactic acid:   Lab Results   Component Value Date    LACTATE 1.9 09/28/2022    LACTATE 1.9 09/04/2022    LACTATE 1.9 09/04/2022       Significant Imaging: I have reviewed all pertinent imaging results/findings within the past 24 hours.      Total visit time: 55 minutes    > 50% of  35 min visit spent in chart review, face to face discussion of symptom assessment, coordination of care with other specialists, and discharge planning.    20 min ACP time: goals of care, emotional support, formulating and communicating prognosis, exploring burden/ benefit of various approaches of treatment.     Janet Hawkins NP  Palliative Medicine  [facility]

## 2022-10-03 NOTE — PROGRESS NOTES
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     The patient developed worsening shortness of breath one day prior to presentation. The patient reports that his SOB became worse because he was in his yard and was bothered by the heat. His episode was associated with worsening hypoxia documented at home (SpO2: 88%, on 3L, with occasional drops to the high 70s). His wife called the oncology clinic and they were instructed to present to the ED for further management. The patient denies having worsening cough and reports feeling fine at the time of examination. He denied having any chest pain but did report having worsening lower extremity edema. He is compliant with his medications but no longer taking Lasix. No fevers or chills. No urinary or GI symptoms. While in the ER, the patient was saturating 92% on 6L of supplemental O2, but otherwise was hemodynamically stable. Labs showed no leukocytosis (11.1), normocytic anemia (12.5), elevated BNP (447), negative lactate and procalcitonin (0.15). CTA chest showed no evidence of acute pulmonary thromboembolism, and a decrease in right pleural fluid collection with stable consolidated changes in the right and left lungs. He was administered Duo-Nebs, aspirin 325 mg and ceftriaxone. He was admitted for further management.       Overview/Hospital Course:  Patient was admitted to the  hospital for SOB- due to possible CHF/COPD/progression of lung CA and pulmonary hypertension.  Started on treatment.  Pulmonary consulted. Palliative care was consulted as well. Patient discovered to have A-fib with RVR. Cards consulted. PT/OT consulted as well and rec: H/H without DME.  Patient continued to require 6L of 02.        Interval History:  No new issues     Review of Systems   Constitutional:  Negative for activity change.   HENT:  Negative for congestion and dental problem.    Eyes:  Negative for discharge and itching.   Respiratory:  Negative for chest tightness and shortness of breath.    Cardiovascular:  Negative for chest pain.   Gastrointestinal:  Negative for abdominal pain.   Genitourinary:  Negative for difficulty urinating and dysuria.   Musculoskeletal:  Negative for arthralgias.   Neurological:  Negative for dizziness and facial asymmetry.   Psychiatric/Behavioral:  Negative for agitation and behavioral problems.    Objective:     Vital Signs (Most Recent):  Temp: 97.7 °F (36.5 °C) (10/03/22 0719)  Pulse: (!) 54 (10/03/22 0754)  Resp: 20 (10/03/22 0754)  BP: 128/78 (10/03/22 0719)  SpO2: 98 % (10/03/22 0754)   Vital Signs (24h Range):  Temp:  [97.7 °F (36.5 °C)-98.9 °F (37.2 °C)] 97.7 °F (36.5 °C)  Pulse:  [] 54  Resp:  [16-20] 20  SpO2:  [91 %-100 %] 98 %  BP: (103-128)/(64-81) 128/78     Weight: 90.8 kg (200 lb 2.8 oz)  Body mass index is 28.72 kg/m².    Intake/Output Summary (Last 24 hours) at 10/3/2022 0904  Last data filed at 10/3/2022 0525  Gross per 24 hour   Intake --   Output 600 ml   Net -600 ml      Physical Exam  Vitals and nursing note reviewed.   Constitutional:       General: He is not in acute distress.     Appearance: Normal appearance. He is normal weight. He is not ill-appearing, toxic-appearing or diaphoretic.   Eyes:      Conjunctiva/sclera: Conjunctivae normal.   Cardiovascular:      Rate and Rhythm: Normal rate and regular rhythm.      Heart sounds:     No gallop.    Pulmonary:      Effort: Pulmonary effort is normal. No respiratory distress.   Skin:     General: Skin is warm and dry.   Neurological:      Mental Status: He is alert and oriented to person, place, and time.   Psychiatric:         Mood and Affect: Mood normal.         Behavior: Behavior normal.         Thought Content: Thought content normal.       Significant Labs: All pertinent labs within the past 24 hours have been reviewed.  BMP:   Recent Labs   Lab 10/03/22  0411   *      K 3.5      CO2 24   BUN 40*   CREATININE 1.2   CALCIUM 8.4*     CBC: No results for input(s): WBC, HGB, HCT, PLT in the last 48 hours.    Significant Imaging:       Assessment/Plan:      * Acute on chronic respiratory failure with hypoxia  Patient with Hypoxic Respiratory failure which is Acute on chronic.  he is on home oxygen at 2 LPM. Supplemental oxygen was provided and noted- Oxygen Concentration (%):  [44] 44.   Signs/symptoms of respiratory failure include- increased work of breathing and respiratory distress. Contributing diagnoses includes - CHF and COPD Labs and images were reviewed. Patient Has not had a recent ABG. Will treat underlying causes and adjust management of respiratory failure as follows- See plan for heart failure/COPD     Multifactorial including possible diastolic dysfunction/COPD/Pulmoanry hypertension as well as possible progression of lung CA. Still with high 02 requirements. Will consult pulmonary and palliative care     Pulmonary recs- thinks secondary to A-fib with RVR.  Now rate controlled. Cards consulted.     Continues on 6L NC.  He does have 02 at home   Follow pulmonary recs. Not sure how much better he will do.    Debility  PT/OT consulted and rec: H/H no DME       Persistent atrial fibrillation  Patient with Paroxysmal (<7 days) atrial fibrillation which is controlled currently with Beta Blocker. Patient is currently in atrial fibrillation.[id number] Score: 1. HASBLED Score: Unable  to calculate. Anticoagulation indicated. Anticoagulation done with Eliquis.        Left leg cellulitis  - Patietn reports having AMADEO (L>R)   - Erythema and warmth noted on physical examination  - Likely related to underlying cellulitis     Plan:   - Vancomycin, pharmacy to dose  - Monitor progression/resolution       COPD (chronic obstructive pulmonary disease)  - History of COPD on supplemental O2   - Presented with worsening SOB   - Likely related to a COPD flare (in the setting of recent weather changes)     Plan:   - Prednisone 40 mg x 5 days   - Continue home inhalers   - Duo-Nebs scheduled  - Does not meet criteria for antibiotics     Coronary artery disease involving native coronary artery of native heart without angina pectoris  - Continue home medications (aspirin, Coreg, statin and Plavix)     PAD (peripheral artery disease)  - Resume home medications (aspirin, statin and cilostazol)   - Follow up outpatient with cardiology     Chronic combined systolic and diastolic heart failure  - Echocardiogram (2/22/2022): EF: 35%-40%, GIDD, normal RV systolic function, mild TR   - Echocardiogram (5/18/2022): EF: 65%, indeterminate LV diastolic function, mild TR, PA pressure of 54 mmHg  - Patient presented with worsening SOB and AMADEO   - Elevated BNP, compared to most recent readings   - Reports that he no longer takes his Lasix   - Suspect that he is in exacerbation     Plan:   - Lasix 40 mg IV BID   - Resume home medications     Increase lasix     Essential hypertension  - Resume home medications (amlodipine 10 mg, Coreg 6.25 mg BID, Losartan 100 mg)     Lung cancer, main bronchus, left  - On maintenance chemotherapy and radiation   - Outpatient follow up with oncology         VTE Risk Mitigation (From admission, onward)         Ordered     heparin (porcine) injection 5,000 Units  Every 8 hours         09/28/22 2203     IP VTE HIGH RISK PATIENT  Once         09/28/22 2203     Place sequential compression device   Until discontinued         09/28/22 2203                Discharge Planning   AZ:      Code Status: Full Code   Is the patient medically ready for discharge?:     Reason for patient still in hospital (select all that apply): Patient unstable  Discharge Plan A: Home Health                  Carroll Syed MD  Department of Hospital Medicine   [facility]

## 2022-10-03 NOTE — PT/OT/SLP PROGRESS
Physical Therapy Treatment    Patient Name:  Kashif Iverson   MRN:  [id number]    Recommendations:     Discharge Recommendations:  home with home health and family assistance  Discharge Equipment Recommendations: none   Barriers to discharge: None    Assessment:     Kashif Iverson is a 61 y.o. male admitted with a medical diagnosis of Acute on chronic respiratory failure with hypoxia.  He presents with the following impairments/functional limitations:  weakness, impaired endurance, impaired functional mobility, gait instability, impaired balance, impaired cardiopulmonary response to activity.    Rehab Prognosis: Fair; patient would benefit from acute skilled PT services to address these deficits and reach maximum level of function.    Recent Surgery: * No surgery found *      Plan:     During this hospitalization, patient to be seen 2 x/week to address the identified rehab impairments via gait training, therapeutic activities, therapeutic exercises and progress toward the following goals:    Plan of Care Expires:  10/16/22    Subjective     Chief Complaint: Pt reported that his mouth is so dry.  Pt was offered some water.  Patient/Family Comments/goals: Pt agreeable to ambulation.  Pt would like to use the bathroom.  Pain/Comfort:  Pain Rating 1: 0/10      Objective:     Patient found HOB elevated with oxygen, peripheral IV, telemetry upon PT entry to room.     General Precautions: Standard, fall, respiratory   Orthopedic Precautions:N/A   Braces: N/A  Respiratory Status: Nasal cannula, flow 4 L/min     Functional Mobility: Pt with some confusion, able to be redirected with good participation during therapy.    Bed Mobility:     Scooting: supervision  Supine to Sit: supervision with HOB elevated   Transfers:     Sit to Stand:  stand by assistance with no AD  Bed to Chair: SBA-contact guard assistance with  no AD  using  Step Transfer  Toilet Transfer: stand by assistance and contact guard assistance with  no AD  using  Step  Transfer; Pt able to ambulate to the bathroom with assistance.  Pt stood at sink for handwashing with SBA.    Gait: Pt ambulated ~200 ft x2 trials with CGA-SBA using no AD and on 4L O2 NC.  spO2 on 4L ~85% during ambulation, able to recover to ~93% with rest and pursed lip breathing; HR ~'s bpm.  Pt with decreased step length and juan.    Balance: Pt with fair dynamic standing balance.       AM-PAC 6 CLICK MOBILITY  Turning over in bed (including adjusting bedclothes, sheets and blankets)?: 4  Sitting down on and standing up from a chair with arms (e.g., wheelchair, bedside commode, etc.): 4  Moving from lying on back to sitting on the side of the bed?: 4  Moving to and from a bed to a chair (including a wheelchair)?: 3  Need to walk in hospital room?: 3  Climbing 3-5 steps with a railing?: 3  Basic Mobility Total Score: 21       Therapeutic Activities and Exercises:  BLE standing therex x10 reps with B HHA: marches, calf raises, mini squats, and hip abd/add.    Pt issued/educated on HEP for standing therex, encouraged 2x/day ~10 reps.  Pt encouraged to call for nursing assistance with OOB activities.  Pt verbalized understanding.    Patient left up in chair reclined with all lines intact, call button in reach, and BARBARA Pratt and nurse Kristy notified.  Tray table in front and door left opened.     GOALS:   Multidisciplinary Problems       Physical Therapy Goals          Problem: Physical Therapy    Goal Priority Disciplines Outcome Goal Variances Interventions   Physical Therapy Goal     PT, PT/OT Ongoing, Progressing     Description: Goals to be met by: 10/16/22     Patient will increase functional independence with mobility by performin. Supine to sit with Modified [place]  2. Rolling to Left and Right with Modified [place]  3. Sit to stand transfer with Modified [place]  4. Bed to chair transfer with Modified [place]   5. Gait >500 feet with Modified [place] using  O2  6. Lower extremity exercise program 2 sets x15 reps per handout, with independence                         Time Tracking:     PT Received On: 10/03/22  PT Start Time: 1122     PT Stop Time: 1149  PT Total Time (min): 27 min     Billable Minutes: Gait Training 14 min and Therapeutic Exercise 13 min    Treatment Type: Treatment              10/03/2022

## 2022-10-03 NOTE — ASSESSMENT & PLAN NOTE
Patient with Hypoxic Respiratory failure which is Acute on chronic.  he is on home oxygen at 2 LPM. Supplemental oxygen was provided and noted- Oxygen Concentration (%):  [44] 44.   Signs/symptoms of respiratory failure include- increased work of breathing and respiratory distress. Contributing diagnoses includes - CHF and COPD Labs and images were reviewed. Patient Has not had a recent ABG. Will treat underlying causes and adjust management of respiratory failure as follows- See plan for heart failure/COPD     Multifactorial including possible diastolic dysfunction/COPD/Pulmoanry hypertension as well as possible progression of lung CA. Still with high 02 requirements. Will consult pulmonary and palliative care     Pulmonary recs- thinks secondary to A-fib with RVR.  Now rate controlled. Cards consulted.     Continues on 6L NC.  He does have 02 at home   Follow pulmonary recs. Not sure how much better he will do.

## 2022-10-03 NOTE — PLAN OF CARE
Problem: Physical Therapy  Goal: Physical Therapy Goal  Description: Goals to be met by: 10/16/22     Patient will increase functional independence with mobility by performin. Supine to sit with Modified [place]  2. Rolling to Left and Right with Modified [place]  3. Sit to stand transfer with Modified [place]  4. Bed to chair transfer with Modified [place]   5. Gait >500 feet with Modified [place] using O2  6. Lower extremity exercise program 2 sets x15 reps per handout, with independence    Outcome: Ongoing, Progressing     Pt ambulated ~200 ft with CGA-SBA using on AD and on 4L O2 NC.

## 2022-10-03 NOTE — PT/OT/SLP PROGRESS
Occupational Therapy   Treatment    Name: Kashif Iverson  MRN: [id number]  Admitting Diagnosis:  Acute on chronic respiratory failure with hypoxia       Recommendations:     Discharge Recommendations: home with home health  Discharge Equipment Recommendations:  none  Barriers to discharge:  None    Assessment:     Kashif Iverson is a 61 y.o. male with a medical diagnosis of Acute on chronic respiratory failure with hypoxia.   Performance deficits affecting function are weakness, impaired endurance, impaired self care skills, impaired functional mobility, gait instability, impaired balance, decreased upper extremity function, impaired cardiopulmonary response to activity.    The patient was agreeable to OT but c/o being accused of removing his oxygen. The patient perform UE therex using red theraband x10 reps with O2 sats 92-93% while on 4L NC. OT will continue.    Rehab Prognosis:  Good and Fair; patient would benefit from acute skilled OT services to address these deficits and reach maximum level of function.       Plan:     Patient to be seen 3 x/week to address the above listed problems via self-care/home management, therapeutic activities, therapeutic exercises  Plan of Care Expires: 10/16/22  Plan of Care Reviewed with: patient    Subjective     Pain/Comfort:  Pain Rating 1: 0/10    Objective:       Patient found up in chair , reclined with oxygen, pulse ox (continuous), telemetry upon OT entry to room.    General Precautions: Standard, fall, respiratory   Orthopedic Precautions:N/A   Braces: N/A  Respiratory Status: Nasal cannula, flow 4 L/min     Occupational Performance:     Bed Mobility:    N/T     Functional Mobility/Transfers:  Patient completed Sit <> Stand Transfer with stand by assistance  with  no assistive device   Functional Mobility: The patient stood from his chair for pressure relief, stood and marched in place. OT adjust pads and pillows for comfort    Activities of Daily Living:  N/T      [facility] 6 Click  ADL: 24    Treatment & Education:  Re-oriented the patient to need for oxygen and 4L NC. The patient appeared confused about his O2 use and checked his nose for placement. The patient denies removing oxygen.  OT monitored SpO2 at rest 96% on 4L, HR , with activity 92%,   Educated the patient re: UE therex using red theraband  Pt completed  x 10 BUE HEP with red theraband in seated position:  Shoulder horizontal ab/dduction  Shoulder flexion/extension with ab/adduction  Elbow flexion   Handout placed on table with theraband placed within reach of pt   Pt encouraged to complete x10 reps a day      Patient left up in chair with all lines intact and call button in reach    GOALS:   Multidisciplinary Problems       Occupational Therapy Goals          Problem: Occupational Therapy    Goal Priority Disciplines Outcome Interventions   Occupational Therapy Goal     OT, PT/OT Ongoing, Progressing    Description: Goals to be met by: 10/16/22    Patient will increase functional independence with ADLs by performing:    UE Dressing with Modified [place].  LE Dressing with Modified [place].  Grooming while standing at sink with Modified [place].  Toileting from toilet with Modified [place] for hygiene and clothing management.   Stand pivot transfers with Modified [place].  Step transfer with Modified [place]  Toilet transfer to toilet with Modified [place].  Upper extremity exercise program x15 reps per handout, with independence.                         Time Tracking:     OT Date of Treatment: 10/03/22  OT Start Time: 1602  OT Stop Time: 1626  OT Total Time (min): 24 min    Billable Minutes:Therapeutic Activity 9  Therapeutic Exercise 15  Total Time 24    OT/PURVI: OT          10/3/2022

## 2022-10-03 NOTE — ASSESSMENT & PLAN NOTE
Patient with Paroxysmal (<7 days) atrial fibrillation which is controlled currently with Beta Blocker. Patient is currently in atrial fibrillation.[id number] Score: 1. HASBLED Score: Unable to calculate. Anticoagulation indicated. Anticoagulation done with Eliquis.

## 2022-10-04 NOTE — NURSING
Notified by tele tech of patient's oxygen decreased in the 80s. Patient's oxygen decreased by rt to 3l. Patient's oxygen increased back to 4l at this time. O2 now at 94 percent.

## 2022-10-04 NOTE — ASSESSMENT & PLAN NOTE
Patient with Paroxysmal (<7 days) atrial fibrillation which is controlled currently with Beta Blocker. Patient is currently in atrial fibrillation.[id number] Score: 1. HASBLED Score: Unable to calculate. Anticoagulation indicated. Anticoagulation done with Eliquis.    Continue Metoprolol   Continue eliquis

## 2022-10-04 NOTE — ASSESSMENT & PLAN NOTE
- Patient reports having AMADEO (L>R)   - Erythema and warmth noted on physical examination on admission   - Likely related to underlying cellulitis     Plan:   - Completed 5 days of vancomycin  -Improved, cellulitis resolving

## 2022-10-04 NOTE — PROGRESS NOTES
Pharmacokinetic Assessment Follow Up: IV Vancomycin    Vancomycin serum concentration assessment(s):    The random level was drawn correctly and can be used to guide therapy at this time. The measurement is below the desired definitive target range of 10 to 20 mcg/mL.    Vancomycin Regimen Plan:    Change regimen to Vancomycin 750 mg IV every 12 hours with next serum trough concentration measured at 0630 prior to 3rd dose on 10/5/22    Drug levels (last 3 results):  Recent Labs   Lab Result Units 10/01/22  2139 10/02/22  0552 10/03/22  0411 10/04/22  0441   Vancomycin, Random ug/mL  --  20.5 10.7 9.9   Vancomycin-Trough ug/mL 26.4*  --   --   --        Pharmacy will continue to follow and monitor vancomycin.    Please contact pharmacy at extension [phone] for questions regarding this assessment.    Thank you for the consult,   Marlon Cervantes       Patient brief summary:  Kashif Iverson is a 61 y.o. male initiated on antimicrobial therapy with IV Vancomycin for treatment of skin & soft tissue infection    The patient's current regimen is Vancomycin 750mg q12h    Drug Allergies:   Review of patient's allergies indicates:  No Known Allergies    Actual Body Weight:   90.8 kg    Renal Function:   Estimated Creatinine Clearance: 97.7 mL/min (based on SCr of 0.9 mg/dL).,     Dialysis Method (if applicable):  N/A    CBC (last 72 hours):  No results for input(s): WHITE BLOOD CELL COUNT, HEMOGLOBIN, HEMATOCRIT, PLATELETS, GRAN%, LYMPH%, MONO%, EOSINOPHIL%, BASOPHIL%, DIFFERENTIAL METHOD in the last 72 hours.    Metabolic Panel (last 72 hours):  Recent Labs   Lab Result Units 10/01/22  0600 10/03/22  0411 10/04/22  0441   Sodium mmol/L 132* 136 140   Potassium mmol/L 3.5 3.5 3.1*   Chloride mmol/L 99 100 106   CO2 mmol/L 24 24 25   Glucose mg/dL 127* 209* 153*   BUN mg/dL 39* 40* 24*   Creatinine mg/dL 1.3 1.2 0.9       Vancomycin Administrations:  vancomycin given in the last 96 hours                     vancomycin 1.25 g in  dextrose 5% 250 mL IVPB (ready to mix) (mg) 1,250 mg New Bag 10/03/22 0617    vancomycin in dextrose 5 % 1 gram/250 mL IVPB 1,000 mg ()  Restarted 10/01/22 1254      Restarted  1220      Restarted  1159     1,000 mg New Bag  1140     1,000 mg New Bag 09/30/22 2315    vancomycin 1.25 g in dextrose 5% 250 mL IVPB (ready to mix) (mg) 1,250 mg New Bag 09/30/22 1118                    Microbiologic Results:  Microbiology Results (last 7 days)       Procedure Component Value Units Date/Time    Blood Culture #2 **CANNOT BE ORDERED STAT** [[id number]] Collected: 09/28/22 1641    Order Status: Completed Specimen: Blood from Peripheral, Antecubital, Right Updated: 10/02/22 1903     Blood Culture, Routine No Growth after 4 days.    Blood Culture #1 **CANNOT BE ORDERED STAT** [[id number]] Collected: 09/28/22 1641    Order Status: Completed Specimen: Blood from Peripheral, Hand, Left Updated: 10/02/22 1903     Blood Culture, Routine No Growth after 4 days.    Culture, Respiratory with Gram Stain [[id number]] Collected: 09/30/22 1119    Order Status: Completed Specimen: Respiratory from Sputum, Expectorated Updated: 10/02/22 0648     Respiratory Culture Normal respiratory maksim     Gram Stain (Respiratory) <10 epithelial cells per low power field.     Gram Stain (Respiratory) Moderate Gram positive cocci in clusters     Gram Stain (Respiratory) Few Gram positive cocci in pairs and chains     Gram Stain (Respiratory) Moderate Gram negative rods     Gram Stain (Respiratory) Few WBC's

## 2022-10-04 NOTE — ASSESSMENT & PLAN NOTE
- Hold home medications (amlodipine 10 mg, Coreg 6.25 mg BID, Losartan 100 mg) given controlled blood pressure  -DC home Coreg 6.25 mg BID, as patient started on Metoprolol for Afib  -Monitor BP, resume home meds if BP elevated

## 2022-10-04 NOTE — PLAN OF CARE
"Pt remained free from falls and injury throughout shift, Up in chair most of shift.  Pt with intermittent desaturation multiple times throughout shift, pt found with nasal cannula off, pt reports nasal canula "falls off" however pt noted in room with canula in bed with spouse at bedside assisting pt with gown this evening. Pt and spouse educated on how lack on oxygen can cause disorientation, in which has had this shift, disorientation noted to be increased in times when pt is found without his canula in place.   Problem: Adult Inpatient Plan of Care  Goal: Plan of Care Review  Outcome: Ongoing, Progressing  Goal: Patient-Specific Goal (Individualized)  Outcome: Ongoing, Progressing  Goal: Absence of Hospital-Acquired Illness or Injury  Outcome: Ongoing, Progressing  Goal: Optimal Comfort and Wellbeing  Outcome: Ongoing, Progressing  Goal: Readiness for Transition of Care  Outcome: Ongoing, Progressing     Problem: Infection  Goal: Absence of Infection Signs and Symptoms  Outcome: Ongoing, Progressing     Problem: Coping Ineffective  Goal: Effective Coping  Outcome: Ongoing, Progressing     Problem: Fall Injury Risk  Goal: Absence of Fall and Fall-Related Injury  Outcome: Ongoing, Progressing     "

## 2022-10-04 NOTE — NURSING
Received phone call from monitor tech pts heart rate 140-160. Manual assessment HR in the 80's. Pt c/o his heart bounding. EKG obtained A flutter with variable AV block. Incomplete right bundle branch block. Notified the primary nurse for this pt

## 2022-10-04 NOTE — CONSULTS
Hematology- Oncology Consult Note :     10/4/2022    Reason for consult: Lung cancer    HPI      Pt is a 61 year old male with a PMHx of SCC of the lung currently on gemcitabine tx, COPD/bronchiestasis (on 2L O2), HTN, HFpEF (EF: 65%), CVA, CAD, PAD who presented to the  ED for shortness of breath that worsened over the previous day.  Pt stated he noticed some worsening LE edema. CTA chest showed no evidence of acute pulmonary thromboembolism, and a decrease in right pleural fluid collection with stable consolidated changes in the right and left lungs. He was administered Duo-Nebs, aspirin 325 mg and ceftriaxone and admitted.  On further workup pt was found to have A-fib with RVR. Cards and pulm consulted as pt required 6L O2 and was reported to have confusion.      Pt seen at bedside today and was lethargic but able to answer questions and was oriented at time of exam.  Pt noted to be on %L O2 and reported feeling better.  Once current issues with hypoxia resolve pt would like to continue follow up and tx with oncology clinic if possible.           Active Problem List with Overview Notes    Diagnosis Date Noted    Debility 10/03/2022    ACP (advance care planning) 10/03/2022    Persistent atrial fibrillation 09/30/2022    Left leg cellulitis 09/28/2022    COPD (chronic obstructive pulmonary disease) 09/05/2022    Acute respiratory failure with hypoxia and hypercarbia 09/05/2022    Chronic respiratory failure with hypoxia 06/16/2022    Acute on chronic respiratory failure with hypoxia 06/10/2022    Dependence on supplemental oxygen 05/24/2022    Goals of care, counseling/discussion 05/18/2022    PAD (peripheral artery disease) 03/14/2022     LUIS FELIPE 3/11/22      Coronary artery disease involving native coronary artery of native heart without angina pectoris 03/14/2022    Multifocal atrial tachycardia 02/23/2022    Chronic combined systolic and diastolic heart failure 02/23/2022    History of non-ST  "elevation myocardial infarction (NSTEMI) 02/22/2022    Palliative care encounter 02/22/2022    Essential hypertension     Atherosclerosis of aorta 11/10/2021     CT Chest Abdomen Pelvis-9/24/2021  "The thoracic aorta is of normal caliber and demonstrates mild atherosclerotic plaque. . . Infrarenal abdominal aorta ectasia with atherosclerotic plaque."          Bronchiectasis without complication 11/10/2021     CT Chest Abdomen Pelvis-9/24/2021   "Bronchiectasis and scarring noted within the left lower lobe."         Immunodeficiency due to drugs 08/17/2021    Phlebitis after infusion 04/30/2021    Secondary malignant neoplasm of bone 09/24/2019     S/p palliative radiation to rib in 2019 with Dr. Bolden      Lung cancer, main bronchus, left 09/10/2019     8/30/2019 underwent bronchoscopy that showed "A partially obstructing (about 80% obstructed) mass was found in the middle portion in the left mainstem bronchus. The mass was large and bloody, circumferential, endobronchial, friable and necrotic. 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  No actionable mutations and PD-L1 5%.  9/19/21 staging PET CT showed "Hypermetabolic left lung mass concerning for primary pulmonary malignancy with metastatic disease involving the right 6th and 9th ribs as well as mediastinal and left supraclavicular lymph nodes."  Stage IV squamous cell carcinoma of the lung at diagnosis.    10/8/2019-10/21/2019 he received palliative chemoradiation for rib pain with carboplatin and paclitaxel.  He received 3 cycles of carboplatin and paclitaxel.  He developed anaphylactic reaction to paclitaxel.  The chest was treated with AP:PA fields and the right 9th rib was treated with anterior and posterior oblique fields at 300 cGy per fraction to 3000 cGy.   Lt chest 6X 300 10 / 10 3,000   Rt 9th rib 6X 300 10 / 10 3,000     10/31/2019-9/10/2020 he received " pembrolizumab (completed 15 cycles, last dose 8/21/2020)  9/10/2020 PET CT showed progression of disease.  He was then referred to Dr. Key for evaluation for clinical trials.  10/20/2020 he underwent repeat biopsy for LUNG MAP trial and was randomized to Ramucirumab + pembrolizumab.    11/17/2020 started ramucirumab+pembrolizumab.  Completed 4 cycles and then 2/10/2021 scans showed progression.    2/24/2021 he was subsequent started on gemcitabine with Dr. Cruz.      History of completed stroke 09/03/2019 09/03/2019  On CT exam      Poor dentition 09/03/2019    Multiple pulmonary nodules 08/24/2019    Macrocytic anemia 08/24/2019    Former smoker 08/23/2019       Patient Active Problem List    Diagnosis Date Noted    Debility 10/03/2022    ACP (advance care planning) 10/03/2022    Persistent atrial fibrillation 09/30/2022    Left leg cellulitis 09/28/2022    COPD (chronic obstructive pulmonary disease) 09/05/2022    Acute respiratory failure with hypoxia and hypercarbia 09/05/2022    Chronic respiratory failure with hypoxia 06/16/2022    Acute on chronic respiratory failure with hypoxia 06/10/2022    Dependence on supplemental oxygen 05/24/2022    Goals of care, counseling/discussion 05/18/2022    PAD (peripheral artery disease) 03/14/2022    Coronary artery disease involving native coronary artery of native heart without angina pectoris 03/14/2022    Multifocal atrial tachycardia 02/23/2022    Chronic combined systolic and diastolic heart failure 02/23/2022    History of non-ST elevation myocardial infarction (NSTEMI) 02/22/2022    Palliative care encounter 02/22/2022    Essential hypertension     Atherosclerosis of aorta 11/10/2021    Bronchiectasis without complication 11/10/2021    Immunodeficiency due to drugs 08/17/2021    Phlebitis after infusion 04/30/2021    Secondary malignant neoplasm of bone 09/24/2019    Lung cancer, main bronchus, left 09/10/2019    History of  completed stroke 09/03/2019    Poor dentition 09/03/2019    Multiple pulmonary nodules 08/24/2019    Macrocytic anemia 08/24/2019    Former smoker 08/23/2019     Past Medical History:   Diagnosis Date    Combined systolic and diastolic heart failure     Hypertension     Lung cancer     NSCLC    Lung mass     left lung      Past Surgical History:   Procedure Laterality Date    BRONCHOSCOPY N/A 8/30/2019    Procedure: Bronchoscopy;  Surgeon: Miguel Diagnostic Provider;  Location: [facility] OR [address] [place] [address];  Service: Anesthesiology;  Laterality: N/A;    CORONARY ANGIOGRAPHY N/A 3/4/2022    Procedure: ANGIOGRAM, CORONARY ARTERY;  Surgeon: Robson Green MD;  Location: [facility] CATH LAB;  Service: Cardiology;  Laterality: N/A;      Medications Prior to Admission   Medication Sig Dispense Refill Last Dose    albuterol (VENTOLIN HFA) 90 mcg/actuation inhaler Inhale 2 puffs into the lungs every 6 (six) hours as needed for Wheezing. Rescue 18 g 11 Past Month    amLODIPine (NORVASC) 10 MG tablet Take 1 tablet (10 mg total) by mouth once daily. 30 tablet 11 9/28/2022    ascorbic acid-multivit-min (VITAMIN C ENERGY BOOSTER) 1,000 mg PwEP Take 3,000 mg by mouth once daily. Patient takes 3,000 mg per day   9/28/2022    aspirin 81 MG Chew Take 1 tablet (81 mg total) by mouth once daily. 30 tablet 11 9/28/2022    atorvastatin (LIPITOR) 80 MG tablet Take 1 tablet (80 mg total) by mouth once daily. 90 tablet 3 9/28/2022    carvediloL (COREG) 6.25 MG tablet Take 1 tablet (6.25 mg total) by mouth 2 (two) times daily. 60 tablet 11 9/28/2022    cilostazoL (PLETAL) 100 MG Tab Take 1 tablet (100 mg total) by mouth 2 (two) times daily. 60 tablet 11 9/28/2022    clopidogreL (PLAVIX) 75 mg tablet Take 1 tablet (75 mg total) by mouth once daily. 30 tablet 11 9/28/2022    fluticasone-umeclidin-vilanter (TRELEGY ELLIPTA) 100-62.5-25 mcg DsDv Inhale 1 puff into the lungs once daily. 1 each 1 9/28/2022    losartan (COZAAR) 100 MG tablet Take  1 tablet (100 mg total) by mouth once daily. 90 tablet 3 2022    [] potassium chloride SA (K-DUR,KLOR-CON) 20 MEQ tablet Take 2 tablets (40 mEq total) by mouth once daily. for 5 days 10 tablet 0 2022    predniSONE (DELTASONE) 10 MG tablet Take 3 pills by mouth daily x 5 days, then take 2 pills by mouth daily x 5 days, then take one pill daily x 5 days 30 tablet 0 2022    furosemide (LASIX) 40 MG tablet Take 1 tablet (40 mg total) by mouth 2 (two) times a day. 60 tablet 2      Review of patient's allergies indicates:  No Known Allergies   Social History     Tobacco Use    Smoking status: Former     Packs/day: 1.00     Years: 25.00     Pack years: 25.00     Types: Cigarettes     Quit date:      Years since quittin.7    Smokeless tobacco: Never   Substance Use Topics    Alcohol use: Yes     Comment: ocassionally      Family History   Problem Relation Age of Onset    Diabetes Mother     Heart defect Mother     Cancer Father     Cancer Sister     No Known Problems Son         Review of Systems :  Review of Systems   Constitutional:  Positive for malaise/fatigue. Negative for chills and fever.   HENT:  Negative for congestion and hearing loss.    Eyes:  Negative for blurred vision and double vision.   Respiratory:  Positive for shortness of breath. Negative for cough.    Cardiovascular:  Negative for chest pain and palpitations.   Gastrointestinal:  Negative for abdominal pain, blood in stool, constipation, diarrhea, heartburn and melena.   Genitourinary:  Negative for dysuria and hematuria.   Musculoskeletal:  Negative for joint pain and myalgias.   Skin:  Negative for itching and rash.   Neurological:  Negative for dizziness, sensory change, speech change, focal weakness and headaches.   Endo/Heme/Allergies:  Does not bruise/bleed easily.   Psychiatric/Behavioral:  Negative for depression. The patient is not nervous/anxious.      Physical Exam :  Wt Readings from Last 3  Encounters:   10/03/22 90.8 kg (200 lb 2.8 oz)   09/23/22 90.7 kg (199 lb 15.3 oz)   09/06/22 88.3 kg (194 lb 12.4 oz)     Temp Readings from Last 3 Encounters:   10/04/22 98.4 °F (36.9 °C) (Oral)   09/27/22 98.1 °F (36.7 °C)   09/06/22 97.9 °F (36.6 °C) (Oral)     BP Readings from Last 3 Encounters:   10/04/22 (!) 148/71   09/27/22 127/72   09/23/22 (!) 175/90     Pulse Readings from Last 3 Encounters:   10/04/22 (!) 56   09/27/22 87   09/23/22 89     Body mass index is 28.72 kg/m².    Physical Exam  Vitals reviewed.   Constitutional:       Comments: Lethargic on 5L O2   HENT:      Head: Normocephalic and atraumatic.      Nose: Nose normal.      Mouth/Throat:      Mouth: Mucous membranes are moist.   Eyes:      Extraocular Movements: Extraocular movements intact.      Pupils: Pupils are equal, round, and reactive to light.   Cardiovascular:      Rate and Rhythm: Normal rate.      Heart sounds: Normal heart sounds.   Pulmonary:      Effort: Pulmonary effort is normal.      Breath sounds: Rhonchi present.      Comments: On O2  Abdominal:      General: Abdomen is flat.   Musculoskeletal:         General: Normal range of motion.      Cervical back: Normal range of motion and neck supple.   Neurological:      Mental Status: He is oriented to person, place, and time.      Comments: Pt lethargic but oriented at time of exam and able to answer questions appropriately   Psychiatric:         Mood and Affect: Mood normal.         Behavior: Behavior normal.         Thought Content: Thought content normal.         Judgment: Judgment normal.         Pertinent Diagnostic studies:    Recent Results (from the past 24 hour(s))   Vancomycin, Random    Collection Time: 10/04/22  4:41 AM   Result Value Ref Range    Vancomycin, Random 9.9 Not established ug/mL   Basic Metabolic Panel    Collection Time: 10/04/22  4:41 AM   Result Value Ref Range    Sodium 140 136 - 145 mmol/L    Potassium 3.1 (L) 3.5 - 5.1 mmol/L    Chloride 106 95 -  110 mmol/L    CO2 25 23 - 29 mmol/L    Glucose 153 (H) 70 - 110 mg/dL    BUN 24 (H) 8 - 23 mg/dL    Creatinine 0.9 0.5 - 1.4 mg/dL    Calcium 8.2 (L) 8.7 - 10.5 mg/dL    Anion Gap 9 8 - 16 mmol/L    eGFR >60 >60 mL/min/1.73 m^2       Assessment/Recs:     SCC lung cancer  -Currently on gemcitabine and radiation therapy.    -Cancer has been largely stable/slow growing over past few scans  -Once acute issues resolve will see pt in clinic for follow up and additional tx   -Agree with CT scan of head to assess for confusion, pt oriented on exam today and lethargy most likely due to hypoxia but will rule out possible mets.    -Palliative care following       Acute on chronic respiratory failure with hypoxia  -Increase in O2 requirements are likely due to afib and underlying COPD rather than the cancer  -CTA negative for PE       COPD   -Concern for exacerbation  -On Augmentin and follow up sputum cultures        Chronic combined systolic and diastolic heart failure  -Most likely worsened by afib  -Per cardiology       Future Appointments   Date Time Provider Department Center   10/7/2022  8:00 AM Guerline Higgins MD [facility] HEM ONC [facility]   10/11/2022  8:40 AM Catia Roman MD [facility] CARDIO [facility]   10/11/2022 10:00 AM CHAIR 06 [facility] CHEMO [facility]   10/25/2022 10:00 AM CHAIR 05 [facility] CHEMO [facility]         Guerline Higgins MD   Hematology/oncology, [facility] [place]

## 2022-10-04 NOTE — PLAN OF CARE
Pt remained free from falls and injury throughout shift. Pt with consistent irregular heart rate, HR up to 150's at times. HR remains aflutter/afib in the 110's after IV lopressor. Pt doing better with keeping O2 in place this shift  Problem: Adult Inpatient Plan of Care  Goal: Plan of Care Review  Outcome: Ongoing, Progressing  Goal: Patient-Specific Goal (Individualized)  Outcome: Ongoing, Progressing  Goal: Absence of Hospital-Acquired Illness or Injury  Outcome: Ongoing, Progressing  Goal: Optimal Comfort and Wellbeing  Outcome: Ongoing, Progressing  Goal: Readiness for Transition of Care  Outcome: Ongoing, Progressing     Problem: Infection  Goal: Absence of Infection Signs and Symptoms  Outcome: Ongoing, Progressing     Problem: Coping Ineffective  Goal: Effective Coping  Outcome: Ongoing, Progressing     Problem: Fall Injury Risk  Goal: Absence of Fall and Fall-Related Injury  Outcome: Ongoing, Progressing     Problem: Skin Injury Risk Increased  Goal: Skin Health and Integrity  Outcome: Ongoing, Progressing

## 2022-10-04 NOTE — SUBJECTIVE & OBJECTIVE
Interval History:  No acute overnight events.  Patient remains afebrile.  Wean O2 as tolerated.  Nurse reports the patient frequently takes off his supplemental oxygen, which results in hypoxia. Nurse reports patient is still intermittently confused. Able to answer most questions appropriately.  Able to tell me his name, date of birth, his wife's name, and that he is in the building.  Unable to tell me that he was currently in the hospital.  States that shortness of breath is at baseline.  No coughing.    Review of Systems   Constitutional:  Negative for chills, fatigue and fever.   HENT:  Negative for congestion.    Eyes:  Negative for visual disturbance.   Respiratory:  Positive for shortness of breath (at baseline). Negative for cough.    Cardiovascular:  Negative for chest pain, palpitations and leg swelling.   Gastrointestinal:  Negative for abdominal pain, nausea and vomiting.   Genitourinary:  Negative for difficulty urinating and dysuria.   Neurological:  Positive for weakness. Negative for dizziness and headaches.   Psychiatric/Behavioral:  Positive for confusion. Negative for hallucinations.      Objective:     Vital Signs (Most Recent):  Temp: 98.4 °F (36.9 °C) (10/04/22 1134)  Pulse: (!) 56 (10/04/22 1254)  Resp: 18 (10/04/22 1254)  BP: (!) 148/71 (10/04/22 1134)  SpO2: 95 % (10/04/22 1254)   Vital Signs (24h Range):  Temp:  [97.9 °F (36.6 °C)-98.9 °F (37.2 °C)] 98.4 °F (36.9 °C)  Pulse:  [] 56  Resp:  [18-20] 18  SpO2:  [88 %-99 %] 95 %  BP: (119-153)/(60-77) 148/71     Weight: 90.8 kg (200 lb 2.8 oz)  Body mass index is 28.72 kg/m².    Intake/Output Summary (Last 24 hours) at 10/4/2022 1324  Last data filed at 10/4/2022 0825  Gross per 24 hour   Intake 480 ml   Output 1050 ml   Net -570 ml      Physical Exam  Vitals and nursing note reviewed.   Constitutional:       General: He is not in acute distress.     Appearance: He is ill-appearing (chronically ill appearing).   HENT:      Right Ear:  External ear normal.      Left Ear: External ear normal.      Mouth/Throat:      Mouth: Mucous membranes are dry.   Eyes:      Conjunctiva/sclera: Conjunctivae normal.   Cardiovascular:      Rate and Rhythm: Regular rhythm. Bradycardia present.      Heart sounds:     No gallop.   Pulmonary:      Effort: Pulmonary effort is normal. No respiratory distress.      Breath sounds: Decreased air movement present. Decreased breath sounds present. No wheezing.      Comments: Diminished breath sounds. Currently on 4L NC  Skin:     General: Skin is warm and dry.   Neurological:      Mental Status: He is alert.      Comments: Alert and oriented to person, time, , president. Unable to tell me he was in the hospital. Cannot tell me why he is in the hosptial    Psychiatric:         Mood and Affect: Mood normal.         Behavior: Behavior normal.         Cognition and Memory: Cognition is impaired. Memory is impaired.       Significant Labs: All pertinent labs within the past 24 hours have been reviewed.    Significant Imaging: I have reviewed all pertinent imaging results/findings within the past 24 hours.

## 2022-10-04 NOTE — ASSESSMENT & PLAN NOTE
- Echocardiogram (2/22/2022): EF: 35%-40%, GIDD, normal RV systolic function, mild TR   - Echocardiogram (5/18/2022): EF: 65%, indeterminate LV diastolic function, mild TR, PA pressure of 54 mmHg  - Patient presented with worsening SOB and AMADEO   - Elevated BNP, compared to most recent readings   - Reports that he no longer takes his Lasix outpatient   - Suspect that he is in exacerbation     Plan:   - Continue diuresis with oral lasix 40mg BID   - Strict Is/Os

## 2022-10-04 NOTE — ASSESSMENT & PLAN NOTE
- History of COPD on supplemental O2, 2L NC at home   - Presented with worsening SOB   - Likely related to a COPD flare (in the setting of recent weather changes)     Plan:   - Completed Prednisone 40 mg x 5 days   - Continue home inhalers   - Duo-Nebs scheduled  - Completed 3 days of Augmentin and 5 days of vancomycin   - Wean O2 as tolerated

## 2022-10-04 NOTE — PROGRESS NOTES
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     The patient developed worsening shortness of breath one day prior to presentation. The patient reports that his SOB became worse because he was in his yard and was bothered by the heat. His episode was associated with worsening hypoxia documented at home (SpO2: 88%, on 3L, with occasional drops to the high 70s). His wife called the oncology clinic and they were instructed to present to the ED for further management. The patient denies having worsening cough and reports feeling fine at the time of examination. He denied having any chest pain but did report having worsening lower extremity edema. He is compliant with his medications but no longer taking Lasix. No fevers or chills. No urinary or GI symptoms. While in the ER, the patient was saturating 92% on 6L of supplemental O2, but otherwise was hemodynamically stable. Labs showed no leukocytosis (11.1), normocytic anemia (12.5), elevated BNP (447), negative lactate and procalcitonin (0.15). CTA chest showed no evidence of acute pulmonary thromboembolism, and a decrease in right pleural fluid collection with stable consolidated changes in the right and left lungs. He was administered Duo-Nebs, aspirin 325 mg and ceftriaxone. He was admitted for further management.       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  Required increased supplemental O2. Started on treatment.  Pulmonary consulted-suspect due to Afib with RVR. Weaning down O2.  Palliative care was consulted as well. Cards consulted for afib RVR- continue po metoprolol with IV as needed. EF 60% on echo. Switch to DOAC prior to discharge. PT/OT consulted as well and rec: H/H without DME.  Patient continues to have confusion. Concern for possible mets to the brain. Oncology consulted. CT head ordered.  Wean O2 as tolerated.       Interval History:  No acute overnight events.  Patient remains afebrile.  Wean O2 as tolerated.  Nurse reports the patient frequently takes off his supplemental oxygen, which results in hypoxia. Nurse reports patient is still intermittently confused. Able to answer most questions appropriately.  Able to tell me his name, date of birth, his wife's name, and that he is in the building.  Unable to tell me that he was currently in the hospital.  States that shortness of breath is at baseline.  No coughing.    Review of Systems   Constitutional:  Negative for chills, fatigue and fever.   HENT:  Negative for congestion.    Eyes:  Negative for visual disturbance.   Respiratory:  Positive for shortness of breath (at baseline). Negative for cough.    Cardiovascular:  Negative for chest pain, palpitations and leg swelling.   Gastrointestinal:  Negative for abdominal pain, nausea and vomiting.   Genitourinary:  Negative for difficulty urinating and dysuria.   Neurological:  Positive for weakness. Negative for dizziness and headaches.   Psychiatric/Behavioral:  Positive for confusion. Negative for hallucinations.      Objective:     Vital Signs (Most  Recent):  Temp: 98.4 °F (36.9 °C) (10/04/22 1134)  Pulse: (!) 56 (10/04/22 1254)  Resp: 18 (10/04/22 1254)  BP: (!) 148/71 (10/04/22 1134)  SpO2: 95 % (10/04/22 1254)   Vital Signs (24h Range):  Temp:  [97.9 °F (36.6 °C)-98.9 °F (37.2 °C)] 98.4 °F (36.9 °C)  Pulse:  [] 56  Resp:  [18-20] 18  SpO2:  [88 %-99 %] 95 %  BP: (119-153)/(60-77) 148/71     Weight: 90.8 kg (200 lb 2.8 oz)  Body mass index is 28.72 kg/m².    Intake/Output Summary (Last 24 hours) at 10/4/2022 1324  Last data filed at 10/4/2022 0825  Gross per 24 hour   Intake 480 ml   Output 1050 ml   Net -570 ml      Physical Exam  Vitals and nursing note reviewed.   Constitutional:       General: He is not in acute distress.     Appearance: He is ill-appearing (chronically ill appearing).   HENT:      Right Ear: External ear normal.      Left Ear: External ear normal.      Mouth/Throat:      Mouth: Mucous membranes are dry.   Eyes:      Conjunctiva/sclera: Conjunctivae normal.   Cardiovascular:      Rate and Rhythm: Regular rhythm. Bradycardia present.      Heart sounds:     No gallop.   Pulmonary:      Effort: Pulmonary effort is normal. No respiratory distress.      Breath sounds: Decreased air movement present. Decreased breath sounds present. No wheezing.      Comments: Diminished breath sounds. Currently on 4L NC  Skin:     General: Skin is warm and dry.   Neurological:      Mental Status: He is alert.      Comments: Alert and oriented to person, time, , president. Unable to tell me he was in the hospital. Cannot tell me why he is in the hosptial    Psychiatric:         Mood and Affect: Mood normal.         Behavior: Behavior normal.         Cognition and Memory: Cognition is impaired. Memory is impaired.       Significant Labs: All pertinent labs within the past 24 hours have been reviewed.    Significant Imaging: I have reviewed all pertinent imaging results/findings within the past 24 hours.      Assessment/Plan:      * Acute on chronic  respiratory failure with hypoxia  Patient with Hypoxic Respiratory failure which is Acute on chronic.  he is on home oxygen at 2 LPM. Supplemental oxygen was provided and noted- Oxygen Concentration (%):  [36] 36.   Signs/symptoms of respiratory failure include- increased work of breathing and respiratory distress. Contributing diagnoses includes - CHF and COPD Labs and images were reviewed. Patient Has not had a recent ABG. Will treat underlying causes and adjust management of respiratory failure as follows- See plan for heart failure/COPD     -Multifactorial including possible diastolic dysfunction/COPD/Pulmoanry hypertension as well as possible progression of lung CA.   -pulmonology consulted: Increase in O2 requirements are likely due to atrial fibrillation.  Possible COPD exacerbation given bronchiectasis, and lung cancer cause an increased disposition for infection. Treated with antibiotics.   -Cardiology consulted for afib: continue metoprolol   -Wean O2 as tolerated     Persistent atrial fibrillation  Patient with Paroxysmal (<7 days) atrial fibrillation which is controlled currently with Beta Blocker. Patient is currently in atrial fibrillation.[id number] Score: 1. HASBLED Score: Unable to calculate. Anticoagulation indicated. Anticoagulation done with Eliquis.    Continue Metoprolol   Continue eliquis     Chronic combined systolic and diastolic heart failure  - Echocardiogram (2/22/2022): EF: 35%-40%, GIDD, normal RV systolic function, mild TR   - Echocardiogram (5/18/2022): EF: 65%, indeterminate LV diastolic function, mild TR, PA pressure of 54 mmHg  - Patient presented with worsening SOB and AMADEO   - Elevated BNP, compared to most recent readings   - Reports that he no longer takes his Lasix outpatient   - Suspect that he is in exacerbation     Plan:   - Continue diuresis with oral lasix 40mg BID   - Strict Is/Os    COPD (chronic obstructive pulmonary disease)  - History of COPD on supplemental O2, 2L NC  at home   - Presented with worsening SOB   - Likely related to a COPD flare (in the setting of recent weather changes)     Plan:   - Completed Prednisone 40 mg x 5 days   - Continue home inhalers   - Duo-Nebs scheduled  - Completed 3 days of Augmentin and 5 days of vancomycin   - Wean O2 as tolerated     Essential hypertension  - Hold home medications (amlodipine 10 mg, Coreg 6.25 mg BID, Losartan 100 mg) given controlled blood pressure  -DC home Coreg 6.25 mg BID, as patient started on Metoprolol for Afib  -Monitor BP, resume home meds if BP elevated    Coronary artery disease involving native coronary artery of native heart without angina pectoris  - Continue home medications: aspirin, statin and Plavix     Left leg cellulitis  - Patient reports having AMADEO (L>R)   - Erythema and warmth noted on physical examination on admission   - Likely related to underlying cellulitis     Plan:   - Completed 5 days of vancomycin  -Improved, cellulitis resolving       Lung cancer, main bronchus, left  - On maintenance chemotherapy and radiation   - Consult oncology given intermittent confusion  - Concerns for possible brain mets vs chemotherapy toxicity given intermittent confusion  - CT head ordered   - Palliative care team consulted       PAD (peripheral artery disease)  - Continue home medications (aspirin, statin and cilostazol)   - Follow up outpatient with cardiology     Debility  PT/OT consulted and rec: H/H no DME       ACP (advance care planning)  -Palliative team consulted  -Patient remains full code at this time        - I spent a total of 16 minutes engaging the patient in this advance care planning discussion.                VTE Risk Mitigation (From admission, onward)           Ordered     apixaban tablet 5 mg  2 times daily         10/03/22 0908     IP VTE HIGH RISK PATIENT  Once         09/28/22 2203     Place sequential compression device  Until discontinued         09/28/22 2203                    Discharge  Planning   AZ:      Code Status: Full Code   Is the patient medically ready for discharge?:     Reason for patient still in hospital (select all that apply): Patient trending condition, Treatment, Imaging and Consult recommendations  Discharge Plan A: Home Health   Discharge Delays: None known at this time              Kayla Coleman DO  Department of Hospital Medicine   [facility] [place] [facility]

## 2022-10-04 NOTE — ASSESSMENT & PLAN NOTE
Patient with Hypoxic Respiratory failure which is Acute on chronic.  he is on home oxygen at 2 LPM. Supplemental oxygen was provided and noted- Oxygen Concentration (%):  [36] 36.   Signs/symptoms of respiratory failure include- increased work of breathing and respiratory distress. Contributing diagnoses includes - CHF and COPD Labs and images were reviewed. Patient Has not had a recent ABG. Will treat underlying causes and adjust management of respiratory failure as follows- See plan for heart failure/COPD     -Multifactorial including possible diastolic dysfunction/COPD/Pulmoanry hypertension as well as possible progression of lung CA.   -pulmonology consulted: Increase in O2 requirements are likely due to atrial fibrillation.  Possible COPD exacerbation given bronchiectasis, and lung cancer cause an increased disposition for infection. Treated with antibiotics.   -Cardiology consulted for afib: continue metoprolol   -Wean O2 as tolerated

## 2022-10-04 NOTE — ASSESSMENT & PLAN NOTE
- Continue home medications (aspirin, statin and cilostazol)   - Follow up outpatient with cardiology

## 2022-10-04 NOTE — ASSESSMENT & PLAN NOTE
- On maintenance chemotherapy and radiation   - Consult oncology given intermittent confusion  - Concerns for possible brain mets vs chemotherapy toxicity given intermittent confusion  - CT head ordered   - Palliative care team consulted

## 2022-10-05 NOTE — ASSESSMENT & PLAN NOTE
Patient with Hypoxic Respiratory failure which is Acute on chronic.  he is on home oxygen at 2 LPM. Supplemental oxygen was provided and noted- Oxygen Concentration (%):  [40] 40.   Signs/symptoms of respiratory failure include- increased work of breathing and respiratory distress. Contributing diagnoses includes - CHF and COPD Labs and images were reviewed. Patient Has not had a recent ABG. Will treat underlying causes and adjust management of respiratory failure as follows- See plan for heart failure/COPD     -Multifactorial including possible diastolic dysfunction/COPD/Pulmoanry hypertension as well as possible progression of lung CA.   -pulmonology consulted: Increase in O2 requirements are likely due to atrial fibrillation.  Possible COPD exacerbation given bronchiectasis, and lung cancer cause an increased disposition for infection. Treated with antibiotics.   -CTA chest with no PE  -Cardiology consulted for afib: continue metoprolol   -Wean O2 as tolerated, on 4L NC

## 2022-10-05 NOTE — ASSESSMENT & PLAN NOTE
- Patient reports having AMADEO (L>R)   - Erythema and warmth noted on physical examination on admission   - Likely related to underlying cellulitis     Plan:   - Completed 5 days of vancomycin  - Improved, cellulitis resolving

## 2022-10-05 NOTE — ASSESSMENT & PLAN NOTE
- On maintenance chemotherapy and radiation   - Consult oncology: Currently on gemcitabine and radiation therapy. Confusion and lethargy most likely due to hypoxia  - CT head: Remote ischemic infarct right anterior frontal and right parietooccipital junction stable unchanged. No new stroke, hemorrhage, subdural fluid or mass.   - Palliative care team consulted

## 2022-10-05 NOTE — ASSESSMENT & PLAN NOTE
Patient with Paroxysmal (<7 days) atrial fibrillation which is controlled currently with Beta Blocker. Patient is currently in atrial fibrillation.[id number] Score: 1. HASBLED Score: Unable to calculate. Anticoagulation indicated. Anticoagulation done with Eliquis.    Continue Metoprolol   Continue eliquis

## 2022-10-05 NOTE — PROGRESS NOTES
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     The patient developed worsening shortness of breath one day prior to presentation. The patient reports that his SOB became worse because he was in his yard and was bothered by the heat. His episode was associated with worsening hypoxia documented at home (SpO2: 88%, on 3L, with occasional drops to the high 70s). His wife called the oncology clinic and they were instructed to present to the ED for further management. The patient denies having worsening cough and reports feeling fine at the time of examination. He denied having any chest pain but did report having worsening lower extremity edema. He is compliant with his medications but no longer taking Lasix. No fevers or chills. No urinary or GI symptoms. While in the ER, the patient was saturating 92% on 6L of supplemental O2, but otherwise was hemodynamically stable. Labs showed no leukocytosis (11.1), normocytic anemia (12.5), elevated BNP (447), negative lactate and procalcitonin (0.15). CTA chest showed no evidence of acute pulmonary thromboembolism, and a decrease in right pleural fluid collection with stable consolidated changes in the right and left lungs. He was administered Duo-Nebs, aspirin 325 mg and ceftriaxone. He was admitted for further management.       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  Required increased supplemental O2. Started on treatment.  Pulmonary consulted-suspect due to Afib with RVR. Weaning down O2.  Palliative care was consulted as well. Cards consulted for afib RVR- continue po metoprolol with IV as needed. EF 60% on echo. Switch to DOAC prior to discharge. PT/OT consulted as well and rec: H/H without DME.  Patient continues to have intermittent confusion. Concern for possible mets to the brain. Oncology consulted-suspect confusion due to hypoxia. CT head showed Remote ischemic infarct right anterior frontal and right parietooccipital junction stable unchanged. No new stroke, hemorrhage, subdural fluid or mass. Mental status improving, appears near baseline. Wean O2 as tolerated.       Interval History:  No acute overnight events.  Patient remains afebrile.  Wean O2 as tolerated.  Patient better at keeping the O2 on now. Nurse reports patient is better and not confused. Alert and oriented x 3 this morning. States that shortness of breath is at baseline.  No coughing.    Review of Systems   Constitutional:  Negative for chills, fatigue and fever.   HENT:  Negative for congestion.    Eyes:  Negative for visual disturbance.   Respiratory:  Positive for shortness of breath (at baseline). Negative for cough.    Cardiovascular:  Negative for chest pain, palpitations and leg swelling.   Gastrointestinal:  Negative for abdominal pain, nausea and vomiting.   Genitourinary:  Negative for difficulty urinating and dysuria.   Neurological:  Positive for weakness. Negative for dizziness and headaches.   Psychiatric/Behavioral:  Negative for confusion and hallucinations.      Objective:     Vital  Signs (Most Recent):  Temp: 96.3 °F (35.7 °C) (10/05/22 1146)  Pulse: 103 (10/05/22 1500)  Resp: 19 (10/05/22 1500)  BP: 113/67 (10/05/22 1146)  SpO2: 95 % (10/05/22 1500)   Vital Signs (24h Range):  Temp:  [96.3 °F (35.7 °C)-98.7 °F (37.1 °C)] 96.3 °F (35.7 °C)  Pulse:  [] 103  Resp:  [17-20] 19  SpO2:  [92 %-97 %] 95 %  BP: (113-147)/(62-79) 113/67     Weight: 90.8 kg (200 lb 2.8 oz)  Body mass index is 28.72 kg/m².    Intake/Output Summary (Last 24 hours) at 10/5/2022 1537  Last data filed at 10/5/2022 1436  Gross per 24 hour   Intake 729.84 ml   Output 2750 ml   Net -2020.16 ml        Physical Exam  Vitals and nursing note reviewed.   Constitutional:       General: He is not in acute distress.     Appearance: He is ill-appearing (chronically ill appearing).   HENT:      Right Ear: External ear normal.      Left Ear: External ear normal.      Mouth/Throat:      Mouth: Mucous membranes are moist.   Eyes:      Conjunctiva/sclera: Conjunctivae normal.   Cardiovascular:      Rate and Rhythm: Normal rate. Rhythm irregular.      Heart sounds:     No gallop.   Pulmonary:      Effort: Pulmonary effort is normal. No respiratory distress.      Breath sounds: Decreased air movement present. Decreased breath sounds present. No wheezing.      Comments: Diminished breath sounds. Currently on 4L NC  Skin:     General: Skin is warm and dry.   Neurological:      Mental Status: He is alert.      Comments: Alert and oriented to person, time, , president. Able to tell me he was in the hospital today.    Psychiatric:         Mood and Affect: Mood normal.         Behavior: Behavior normal.         Cognition and Memory: Memory is impaired.       Significant Labs: All pertinent labs within the past 24 hours have been reviewed.    Significant Imaging: I have reviewed all pertinent imaging results/findings within the past 24 hours.      Assessment/Plan:      * Acute on chronic respiratory failure with hypoxia  Patient with  Hypoxic Respiratory failure which is Acute on chronic.  he is on home oxygen at 2 LPM. Supplemental oxygen was provided and noted- Oxygen Concentration (%):  [40] 40.   Signs/symptoms of respiratory failure include- increased work of breathing and respiratory distress. Contributing diagnoses includes - CHF and COPD Labs and images were reviewed. Patient Has not had a recent ABG. Will treat underlying causes and adjust management of respiratory failure as follows- See plan for heart failure/COPD     -Multifactorial including possible diastolic dysfunction/COPD/Pulmoanry hypertension as well as possible progression of lung CA.   -pulmonology consulted: Increase in O2 requirements are likely due to atrial fibrillation.  Possible COPD exacerbation given bronchiectasis, and lung cancer cause an increased disposition for infection. Treated with antibiotics.   -CTA chest with no PE  -Cardiology consulted for afib: continue metoprolol   -Wean O2 as tolerated, on 4L NC    Persistent atrial fibrillation  Patient with Paroxysmal (<7 days) atrial fibrillation which is controlled currently with Beta Blocker. Patient is currently in atrial fibrillation.[id number] Score: 1. HASBLED Score: Unable to calculate. Anticoagulation indicated. Anticoagulation done with Eliquis.    Continue Metoprolol   Continue eliquis     Chronic combined systolic and diastolic heart failure  - Echocardiogram (2/22/2022): EF: 35%-40%, GIDD, normal RV systolic function, mild TR   - Echocardiogram (5/18/2022): EF: 65%, indeterminate LV diastolic function, mild TR, PA pressure of 54 mmHg  - Patient presented with worsening SOB and AMADEO   - Elevated BNP, compared to most recent readings   - Reports that he no longer takes his Lasix outpatient   - Suspect that he is in exacerbation     Plan:   - Continue diuresis with oral lasix 40mg BID   - Strict Is/Os    COPD (chronic obstructive pulmonary disease)  - History of COPD on supplemental O2, 2L NC at home   -  Presented with worsening SOB   - Likely related to a COPD flare (in the setting of recent weather changes)     Plan:   - Completed Prednisone 40 mg x 5 days   - Continue home inhalers   - Duo-Nebs scheduled  - Completed 3 days of Augmentin and 5 days of vancomycin   - Wean O2 as tolerated     Essential hypertension  - Hold home medications (amlodipine 10 mg, Coreg 6.25 mg BID, Losartan 100 mg) given controlled blood pressure  -DC home Coreg 6.25 mg BID, as patient started on Metoprolol for Afib  -Monitor BP, resume home meds if BP elevated    Coronary artery disease involving native coronary artery of native heart without angina pectoris  - Continue home medications: aspirin, statin and Plavix     Left leg cellulitis  - Patient reports having AMADEO (L>R)   - Erythema and warmth noted on physical examination on admission   - Likely related to underlying cellulitis     Plan:   - Completed 5 days of vancomycin  - Improved, cellulitis resolving       Lung cancer, main bronchus, left  - On maintenance chemotherapy and radiation   - Consult oncology: Currently on gemcitabine and radiation therapy. Confusion and lethargy most likely due to hypoxia  - CT head: Remote ischemic infarct right anterior frontal and right parietooccipital junction stable unchanged. No new stroke, hemorrhage, subdural fluid or mass.   - Palliative care team consulted       PAD (peripheral artery disease)  - Continue home medications (aspirin, statin and cilostazol)   - Follow up outpatient with cardiology     Debility  PT/OT consulted and rec: H/H no DME       ACP (advance care planning)  -Palliative team consulted  -Patient remains full code at this time         VTE Risk Mitigation (From admission, onward)         Ordered     apixaban tablet 5 mg  2 times daily         10/03/22 0908     IP VTE HIGH RISK PATIENT  Once         09/28/22 2203     Place sequential compression device  Until discontinued         09/28/22 2203                Discharge  Planning   ZA:      Code Status: Full Code   Is the patient medically ready for discharge?:     Reason for patient still in hospital (select all that apply): Patient trending condition, Treatment and Consult recommendations  Discharge Plan A: Home Health   Discharge Delays: None known at this time              Kayla Coleman DO  Department of Hospital Medicine   [facility]

## 2022-10-05 NOTE — PLAN OF CARE
Chart check complete, pt AAOx4, able to verbalize needs.  IV flushed and saline locked.  Pt on 5L high flow nasal cannula, tolerating well.  Urinal in reach for use, no BM this shift. Tele box monitored with continuous pulse ox.  Pt denies difficulty breathing.  Diet tolerated well, no N/V noted.  No acute distress noted, pt free from falls or injury this shift.  Bed in low position, wheels locked, call light in reach for assistance, will continue to monitor.       Problem: Adult Inpatient Plan of Care  Goal: Plan of Care Review  Outcome: Ongoing, Progressing     Problem: Adult Inpatient Plan of Care  Goal: Patient-Specific Goal (Individualized)  Outcome: Ongoing, Progressing     Problem: Adult Inpatient Plan of Care  Goal: Absence of Hospital-Acquired Illness or Injury  Outcome: Ongoing, Progressing     Problem: Adult Inpatient Plan of Care  Goal: Optimal Comfort and Wellbeing  Outcome: Ongoing, Progressing     Problem: Infection  Goal: Absence of Infection Signs and Symptoms  Outcome: Ongoing, Progressing     Problem: Coping Ineffective  Goal: Effective Coping  Outcome: Ongoing, Progressing

## 2022-10-05 NOTE — SUBJECTIVE & OBJECTIVE
Interval History:  No acute overnight events.  Patient remains afebrile.  Wean O2 as tolerated.  Patient better at keeping the O2 on now. Nurse reports patient is better and not confused. Alert and oriented x 3 this morning. States that shortness of breath is at baseline.  No coughing.    Review of Systems   Constitutional:  Negative for chills, fatigue and fever.   HENT:  Negative for congestion.    Eyes:  Negative for visual disturbance.   Respiratory:  Positive for shortness of breath (at baseline). Negative for cough.    Cardiovascular:  Negative for chest pain, palpitations and leg swelling.   Gastrointestinal:  Negative for abdominal pain, nausea and vomiting.   Genitourinary:  Negative for difficulty urinating and dysuria.   Neurological:  Positive for weakness. Negative for dizziness and headaches.   Psychiatric/Behavioral:  Negative for confusion and hallucinations.      Objective:     Vital Signs (Most Recent):  Temp: 96.3 °F (35.7 °C) (10/05/22 1146)  Pulse: 103 (10/05/22 1500)  Resp: 19 (10/05/22 1500)  BP: 113/67 (10/05/22 1146)  SpO2: 95 % (10/05/22 1500)   Vital Signs (24h Range):  Temp:  [96.3 °F (35.7 °C)-98.7 °F (37.1 °C)] 96.3 °F (35.7 °C)  Pulse:  [] 103  Resp:  [17-20] 19  SpO2:  [92 %-97 %] 95 %  BP: (113-147)/(62-79) 113/67     Weight: 90.8 kg (200 lb 2.8 oz)  Body mass index is 28.72 kg/m².    Intake/Output Summary (Last 24 hours) at 10/5/2022 1537  Last data filed at 10/5/2022 1436  Gross per 24 hour   Intake 729.84 ml   Output 2750 ml   Net -2020.16 ml        Physical Exam  Vitals and nursing note reviewed.   Constitutional:       General: He is not in acute distress.     Appearance: He is ill-appearing (chronically ill appearing).   HENT:      Right Ear: External ear normal.      Left Ear: External ear normal.      Mouth/Throat:      Mouth: Mucous membranes are moist.   Eyes:      Conjunctiva/sclera: Conjunctivae normal.   Cardiovascular:      Rate and Rhythm: Normal rate. Rhythm  irregular.      Heart sounds:     No gallop.   Pulmonary:      Effort: Pulmonary effort is normal. No respiratory distress.      Breath sounds: Decreased air movement present. Decreased breath sounds present. No wheezing.      Comments: Diminished breath sounds. Currently on 4L NC  Skin:     General: Skin is warm and dry.   Neurological:      Mental Status: He is alert.      Comments: Alert and oriented to person, time, , president. Able to tell me he was in the hospital today.    Psychiatric:         Mood and Affect: Mood normal.         Behavior: Behavior normal.         Cognition and Memory: Memory is impaired.       Significant Labs: All pertinent labs within the past 24 hours have been reviewed.    Significant Imaging: I have reviewed all pertinent imaging results/findings within the past 24 hours.

## 2022-10-06 NOTE — SUBJECTIVE & OBJECTIVE
Interval History:  No acute overnight events.  Patient remains afebrile.  Wean O2 as tolerated.  Patient better at keeping the O2 on now. Nurse reports patient is better and not confused. Still needing 5L NC. Alert and oriented x 3 this morning. Unable to tell me hospital name today but told me he was in building and say hospital with some prompting. States that shortness of breath is at baseline.  No coughing.Sitting up and eating breakfast.     Review of Systems   Constitutional:  Negative for chills, fatigue and fever.   HENT:  Negative for congestion.    Eyes:  Negative for visual disturbance.   Respiratory:  Positive for shortness of breath (at baseline). Negative for cough.    Cardiovascular:  Negative for chest pain, palpitations and leg swelling.   Gastrointestinal:  Negative for abdominal pain, nausea and vomiting.   Genitourinary:  Negative for difficulty urinating and dysuria.   Neurological:  Positive for weakness. Negative for dizziness and headaches.   Psychiatric/Behavioral:  Negative for confusion and hallucinations.      Objective:     Vital Signs (Most Recent):  Temp: 98.2 °F (36.8 °C) (10/06/22 1125)  Pulse: 95 (10/06/22 1125)  Resp: 19 (10/06/22 1125)  BP: 122/79 (10/06/22 1125)  SpO2: 96 % (10/06/22 1125)   Vital Signs (24h Range):  Temp:  [98.1 °F (36.7 °C)-99.1 °F (37.3 °C)] 98.2 °F (36.8 °C)  Pulse:  [] 95  Resp:  [16-20] 19  SpO2:  [93 %-96 %] 96 %  BP: (121-148)/(72-90) 122/79     Weight: 90.8 kg (200 lb 2.8 oz)  Body mass index is 28.72 kg/m².    Intake/Output Summary (Last 24 hours) at 10/6/2022 1153  Last data filed at 10/6/2022 0852  Gross per 24 hour   Intake 360 ml   Output 1700 ml   Net -1340 ml        Physical Exam  Vitals and nursing note reviewed.   Constitutional:       General: He is not in acute distress.     Appearance: He is ill-appearing (chronically ill appearing).   HENT:      Right Ear: External ear normal.      Left Ear: External ear normal.      Mouth/Throat:       Mouth: Mucous membranes are moist.   Eyes:      Conjunctiva/sclera: Conjunctivae normal.   Cardiovascular:      Rate and Rhythm: Normal rate. Rhythm irregular.      Heart sounds:     No gallop.   Pulmonary:      Effort: Pulmonary effort is normal. No respiratory distress.      Breath sounds: Decreased air movement present. Decreased breath sounds present. No wheezing.      Comments: Diminished breath sounds. Currently on 5L NC  Abdominal:      General: There is no distension.      Tenderness: There is no abdominal tenderness. There is no guarding.   Skin:     General: Skin is warm and dry.   Neurological:      Mental Status: He is alert.      Comments: Alert and oriented to person, time, , president. Able to tell me he was in the hospital today.    Psychiatric:         Mood and Affect: Mood normal.         Behavior: Behavior normal.         Cognition and Memory: Memory is impaired.       Significant Labs: All pertinent labs within the past 24 hours have been reviewed.    Significant Imaging: I have reviewed all pertinent imaging results/findings within the past 24 hours.

## 2022-10-06 NOTE — PLAN OF CARE
10/06/22 1308   Medicare Message   Important Message from Medicare regarding Discharge Appeal Rights Given to patient/caregiver;Explained to patient/caregiver;Other (comments)  ([place] spoke with Natty Iverson (Spouse)   [phone], via phone, verbalized understanding. Copy sent certified to address in chart. [phone])   Date IMM was signed 10/06/22   Time IMM was signed [id number]

## 2022-10-06 NOTE — PT/OT/SLP PROGRESS
Physical Therapy Treatment    Patient Name:  Kashif Iverson   MRN:  [id number]    Recommendations:     Discharge Recommendations:  home with home health   Discharge Equipment Recommendations: none   Barriers to discharge: None    Assessment:     Kashif Iverson is a 61 y.o. male admitted with a medical diagnosis of Acute on chronic respiratory failure with hypoxia.  He presents with the following impairments/functional limitations:  weakness, impaired endurance, impaired cardiopulmonary response to activity .    Rehab Prognosis: Good; patient would benefit from acute skilled PT services to address these deficits and reach maximum level of function.    Recent Surgery: * No surgery found *      Plan:     During this hospitalization, patient to be seen 2 x/week to address the identified rehab impairments via gait training, therapeutic activities, therapeutic exercises and progress toward the following goals:    Plan of Care Expires:  10/16/22    Subjective     Chief Complaint: none  Patient/Family Comments/goals: pt agreed to therapy  Pain/Comfort:  Pain Rating 1: 0/10  Pain Rating Post-Intervention 1: 0/10      Objective:     Communicated with nurse prior to session.  Patient found HOB elevated with peripheral IV, oxygen, pulse ox (continuous) upon PT entry to room.     General Precautions: Standard, fall, respiratory   Orthopedic Precautions:N/A   Braces:    Respiratory Status: Nasal cannula, flow 2 L/min     Functional Mobility:  Bed Mobility:     Rolling Left:  modified independence  Scooting: modified independence  Supine to Sit: modified independence  Transfers:     Sit to Stand:  modified independence with no AD  Gait: 500 ft with 2L O2 with MOD I  Balance: G standing and sitting      AM-PAC 6 CLICK MOBILITY  Turning over in bed (including adjusting bedclothes, sheets and blankets)?: 4  Sitting down on and standing up from a chair with arms (e.g., wheelchair, bedside commode, etc.): 4  Moving from lying on back to  sitting on the side of the bed?: 4  Moving to and from a bed to a chair (including a wheelchair)?: 4  Need to walk in hospital room?: 4  Climbing 3-5 steps with a railing?: 4  Basic Mobility Total Score: 24       Therapeutic Activities and Exercises:   Lower Extremity Exercises.   Patient educated on the purpose of therapeutic exercise.    Patient verbalized acceptance/understanding of instructions, expectations, and limitations(for safety).  Patient performed: 1 set of 15 reps (each) of B LE There Ex: AP, LAQ, Hip abd/add, Hip flexion while sitting up on EOB.       Patient required still requires verbal cues/tactile cues to ensure correct sequence, to maintain proper form, and to allow for self-correction.  Pt reported no complaints or problems with exercise activity.      Patient left up in chair with all lines intact, call button in reach, and nurse notified..    GOALS:   Multidisciplinary Problems       Physical Therapy Goals          Problem: Physical Therapy    Goal Priority Disciplines Outcome Goal Variances Interventions   Physical Therapy Goal     PT, PT/OT Ongoing, Progressing     Description: Goals to be met by: 10/16/22     Patient will increase functional independence with mobility by performin. Supine to sit with Modified [place]  2. Rolling to Left and Right with Modified [place]  3. Sit to stand transfer with Modified [place]  4. Bed to chair transfer with Modified [place]   5. Gait >500 feet with Modified [place] using O2  6. Lower extremity exercise program 2 sets x15 reps per handout, with independence                         Time Tracking:     PT Received On: 10/06/22  PT Start Time: 1620     PT Stop Time: 1643  PT Total Time (min): 23 min     Billable Minutes: Gait Training 8 and Therapeutic Exercise 15    Treatment Type: Treatment  PT/PTA: PTA     PTA Visit Number: 1     10/06/2022

## 2022-10-06 NOTE — PROGRESS NOTES
[facility] [place] - Med Surg  Adult Nutrition  Progress Note    SUMMARY       Recommendations  Continue cardiac diet as tolerated. Encourage PO intake.  Recommend supplementing Boost Plus TID if PO intake <75%  Monitor nutrition related labs  Goals: Pt to continue receiving >75% EEN by RD f/u  Nutrition Goal Status: new  Communication of RD Recs: other (comment) (POC)    Assessment and Plan  Nutrition Problem  Inadequate energy intake     Related to (etiology):   Increased needs    Signs and Symptoms (as evidenced by):   Dx of cancer, CHF, cellulitis    Interventions/Recommendations (treatment strategy):  Collaboration of care with other providers  MDconnectME    Nutrition Diagnosis Status:   New    Reason for Assessment    Reason For Assessment: RD follow-up  Diagnosis: other (see comments) (Respiratory failure)  Relevant Medical History:   Patient Active Problem List   Diagnosis    Former smoker    Multiple pulmonary nodules    Macrocytic anemia    History of completed stroke    Poor dentition    Lung cancer, main bronchus, left    Secondary malignant neoplasm of bone    Phlebitis after infusion    Immunodeficiency due to drugs    Atherosclerosis of aorta    Bronchiectasis without complication    Essential hypertension    History of non-ST elevation myocardial infarction (NSTEMI)    Palliative care encounter    Multifocal atrial tachycardia    Chronic combined systolic and diastolic heart failure    PAD (peripheral artery disease)    Coronary artery disease involving native coronary artery of native heart without angina pectoris    Goals of care, counseling/discussion    Acute on chronic respiratory failure with hypoxia    Dependence on supplemental oxygen    Chronic respiratory failure with hypoxia    COPD (chronic obstructive pulmonary disease)    Acute respiratory failure with hypoxia and hypercarbia    Left leg cellulitis    Persistent atrial fibrillation    Debility    ACP (advance care planning)  "      Interdisciplinary Rounds: did not attend  General Information Comments: Pt with CHF and lung cancer that has potentially metastasized. Pt has good appetite and has eating around % of meals since admit. Wt has been stable since July of this year. Continue to monitor weight. Pt with increased needs due to disease state.   Nutrition Discharge Planning: Pending clinical course    Nutrition Risk Screen    Nutrition Risk Screen: no indicators present    Nutrition/Diet History    Patient Reported Diet/Restrictions/Preferences: general  Spiritual, Cultural Beliefs, Islam Practices, Values that Affect Care: no    Anthropometrics    Temp: 98.2 °F (36.8 °C)  Height Method: Stated  Height: 5' 10" (177.8 cm)  Height (inches): 70 in  Weight Method: Bed Scale  Weight: 90.8 kg (200 lb 2.8 oz)  Weight (lb): 200.18 lb  Ideal Body Weight (IBW), Male: 166 lb  % Ideal Body Weight, Male (lb): 119.88 %  BMI (Calculated): 28.7       Lab/Procedures/Meds    Pertinent Labs Reviewed: reviewed  Pertinent Labs Comments: CBC:  No results for input(s): WBC, HGB, HCT, PLT in the last 24 hours.  CMP:  Recent Labs   Lab 10/06/22  0402   CALCIUM 8.4*      K 3.1*   CO2 28      BUN 15   CREATININE 0.8         Pertinent Medications Reviewed: reviewed  Pertinent Medications Comments: Scheduled Meds:   albuterol-ipratropium  3 mL Nebulization Q4H WAKE    apixaban  5 mg Oral BID    aspirin  81 mg Oral Daily    atorvastatin  80 mg Oral Daily    cilostazoL  100 mg Oral BID    clopidogreL  75 mg Oral Daily    fluticasone furoate-vilanteroL  1 puff Inhalation Daily    furosemide  40 mg Oral BID    metoprolol succinate  50 mg Oral BID     Continuous Infusions:  PRN Meds:.acetaminophen, docusate sodium, glucagon (human recombinant), glucose, glucose, naloxone, sodium chloride 0.9%      Estimated/Assessed Needs    Weight Used For Calorie Calculations: 90.8 kg (200 lb 2.8 oz)  Energy Calorie Requirements (kcal): 4707-7203 " kcal/day  Energy Need Method: Kcal/kg (30-35 (per cancer dx))  Protein Requirements: 109-136 g/day (1.2-1.5g/kg)  Weight Used For Protein Calculations: 90.8 kg (200 lb 2.8 oz)  Fluid Requirements (mL): 1 mL per kcal or per MD     RDA Method (mL): 2724  CHO Requirement: 341 g      Nutrition Prescription Ordered    Current Diet Order: Cardiac diet    Evaluation of Received Nutrient/Fluid Intake    I/O: -6.3L since admit  Energy Calories Required: meeting needs  Protein Required: meeting needs  Fluid Required: meeting needs  Comments: LBM 10/2  Tolerance: tolerating  % Intake of Estimated Energy Needs: 75 - 100 %  % Meal Intake: 75 - 100 %    Nutrition Risk    Level of Risk/Frequency of Follow-up: moderate - high     Monitor and Evaluation    Food and Nutrient Intake: energy intake, food and beverage intake  Food and Nutrient Adminstration: diet order  Knowledge/Beliefs/Attitudes: food and nutrition knowledge/skill  Physical Activity and Function: nutrition-related ADLs and IADLs  Anthropometric Measurements: height/length, weight, weight change, body mass index  Biochemical Data, Medical Tests and Procedures: electrolyte and renal panel, gastrointestinal profile, glucose/endocrine profile, inflammatory profile, lipid profile  Nutrition-Focused Physical Findings: overall appearance     Nutrition Follow-Up    RD Follow-up?: Yes  Christina Ayala, MPH, RDN, LDN

## 2022-10-06 NOTE — PLAN OF CARE
Problem: Physical Therapy  Goal: Physical Therapy Goal  Description: Goals to be met by: 10/16/22     Patient will increase functional independence with mobility by performin. Supine to sit with Modified [place]  2. Rolling to Left and Right with Modified [place]  3. Sit to stand transfer with Modified [place]  4. Bed to chair transfer with Modified [place]   5. Gait >500 feet with Modified [place] using O2  6. Lower extremity exercise program 2 sets x15 reps per handout, with independence    Outcome: Ongoing, Progressing   Pt juan tx well with increased gt distance /s AD /c MOD I using O2, and no LOB. SPO2 decreased was 88% /p amb but quickly increased to 95% at rest.

## 2022-10-06 NOTE — PROGRESS NOTES
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     The patient developed worsening shortness of breath one day prior to presentation. The patient reports that his SOB became worse because he was in his yard and was bothered by the heat. His episode was associated with worsening hypoxia documented at home (SpO2: 88%, on 3L, with occasional drops to the high 70s). His wife called the oncology clinic and they were instructed to present to the ED for further management. The patient denies having worsening cough and reports feeling fine at the time of examination. He denied having any chest pain but did report having worsening lower extremity edema. He is compliant with his medications but no longer taking Lasix. No fevers or chills. No urinary or GI symptoms. While in the ER, the patient was saturating 92% on 6L of supplemental O2, but otherwise was hemodynamically stable. Labs showed no leukocytosis (11.1), normocytic anemia (12.5), elevated BNP (447), negative lactate and procalcitonin (0.15). CTA chest showed no evidence of acute pulmonary thromboembolism, and a decrease in right pleural fluid collection with stable consolidated changes in the right and left lungs. He was administered Duo-Nebs, aspirin 325 mg and ceftriaxone. He was admitted for further management.       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  Required increased supplemental O2. Started on treatment.  Pulmonary consulted-suspect due to Afib with RVR. Weaning down O2.  Palliative care was consulted as well. Cards consulted for afib RVR- continue po metoprolol with IV as needed. EF 60% on echo. Switch to DOAC prior to discharge. PT/OT consulted as well and rec: H/H without DME.  Patient continues to have intermittent confusion. Concern for possible mets to the brain. Oncology consulted-suspect confusion due to hypoxia. CT head showed Remote ischemic infarct right anterior frontal and right parietooccipital junction stable unchanged. No new stroke, hemorrhage, subdural fluid or mass. Mental status improving, appears near baseline. Wean O2 as tolerated-still continues to require 5L      Interval History:  No acute overnight events.  Patient remains afebrile.  Wean O2 as tolerated.  Patient better at keeping the O2 on now. Nurse reports patient is better and not confused. Still needing 5L NC. Alert and oriented x 3 this morning. Unable to tell me hospital name today but told me he was in building and say hospital with some prompting. States that shortness of breath is at baseline.  No coughing.Sitting up and eating breakfast.     Review of Systems   Constitutional:  Negative for chills, fatigue and fever.   HENT:  Negative for congestion.    Eyes:  Negative for visual disturbance.   Respiratory:  Positive for shortness of breath (at baseline). Negative for cough.    Cardiovascular:  Negative for chest pain, palpitations and leg swelling.   Gastrointestinal:  Negative for abdominal pain, nausea and vomiting.   Genitourinary:  Negative for difficulty urinating  and dysuria.   Neurological:  Positive for weakness. Negative for dizziness and headaches.   Psychiatric/Behavioral:  Negative for confusion and hallucinations.      Objective:     Vital Signs (Most Recent):  Temp: 98.2 °F (36.8 °C) (10/06/22 1125)  Pulse: 95 (10/06/22 1125)  Resp: 19 (10/06/22 1125)  BP: 122/79 (10/06/22 1125)  SpO2: 96 % (10/06/22 112)   Vital Signs (24h Range):  Temp:  [98.1 °F (36.7 °C)-99.1 °F (37.3 °C)] 98.2 °F (36.8 °C)  Pulse:  [] 95  Resp:  [16-20] 19  SpO2:  [93 %-96 %] 96 %  BP: (121-148)/(72-90) 122/79     Weight: 90.8 kg (200 lb 2.8 oz)  Body mass index is 28.72 kg/m².    Intake/Output Summary (Last 24 hours) at 10/6/2022 1153  Last data filed at 10/6/2022 0852  Gross per 24 hour   Intake 360 ml   Output 1700 ml   Net -1340 ml        Physical Exam  Vitals and nursing note reviewed.   Constitutional:       General: He is not in acute distress.     Appearance: He is ill-appearing (chronically ill appearing).   HENT:      Right Ear: External ear normal.      Left Ear: External ear normal.      Mouth/Throat:      Mouth: Mucous membranes are moist.   Eyes:      Conjunctiva/sclera: Conjunctivae normal.   Cardiovascular:      Rate and Rhythm: Normal rate. Rhythm irregular.      Heart sounds:     No gallop.   Pulmonary:      Effort: Pulmonary effort is normal. No respiratory distress.      Breath sounds: Decreased air movement present. Decreased breath sounds present. No wheezing.      Comments: Diminished breath sounds. Currently on 5L NC  Abdominal:      General: There is no distension.      Tenderness: There is no abdominal tenderness. There is no guarding.   Skin:     General: Skin is warm and dry.   Neurological:      Mental Status: He is alert.      Comments: Alert and oriented to person, time, , president. Able to tell me he was in the hospital today.    Psychiatric:         Mood and Affect: Mood normal.         Behavior: Behavior normal.         Cognition and Memory: Memory is  impaired.       Significant Labs: All pertinent labs within the past 24 hours have been reviewed.    Significant Imaging: I have reviewed all pertinent imaging results/findings within the past 24 hours.      Assessment/Plan:      * Acute on chronic respiratory failure with hypoxia  Patient with Hypoxic Respiratory failure which is Acute on chronic.  he is on home oxygen at 2 LPM. Supplemental oxygen was provided and noted-  .   Signs/symptoms of respiratory failure include- increased work of breathing and respiratory distress. Contributing diagnoses includes - CHF and COPD Labs and images were reviewed. Patient Has not had a recent ABG. Will treat underlying causes and adjust management of respiratory failure as follows- See plan for heart failure/COPD     -Multifactorial including possible diastolic dysfunction/COPD/Pulmoanry hypertension as well as possible progression of lung CA.   -pulmonology consulted: Increase in O2 requirements are likely due to atrial fibrillation.  Possible COPD exacerbation given bronchiectasis, and lung cancer cause an increased disposition for infection. Treated with antibiotics.   -CTA chest with no PE  -Cardiology consulted for afib: continue metoprolol   -Wean O2 as tolerated, on 5L NC    Persistent atrial fibrillation  Patient with Paroxysmal (<7 days) atrial fibrillation which is controlled currently with Beta Blocker. Patient is currently in atrial fibrillation.[id number] Score: 1. HASBLED Score: Unable to calculate. Anticoagulation indicated. Anticoagulation done with Eliquis.    Continue Metoprolol   Continue eliquis     Chronic combined systolic and diastolic heart failure  - Echocardiogram (2/22/2022): EF: 35%-40%, GIDD, normal RV systolic function, mild TR   - Echocardiogram (5/18/2022): EF: 65%, indeterminate LV diastolic function, mild TR, PA pressure of 54 mmHg  - Patient presented with worsening SOB and AMADEO   - Elevated BNP, compared to most recent readings   - Reports  that he no longer takes his Lasix outpatient   - Suspect that he is in exacerbation     Plan:   - Continue diuresis with oral lasix 40mg BID   - Strict Is/Os    COPD (chronic obstructive pulmonary disease)  - History of COPD on supplemental O2, 2L NC at home   - Presented with worsening SOB   - Likely related to a COPD flare (in the setting of recent weather changes)     Plan:   - Completed Prednisone 40 mg x 5 days   - Continue home inhalers   - Duo-Nebs scheduled  - Completed 3 days of Augmentin and 5 days of vancomycin   - Wean O2 as tolerated, may be requiring more at baseline now    Essential hypertension  - Hold home medications (amlodipine 10 mg, Coreg 6.25 mg BID, Losartan 100 mg) given controlled blood pressure  -DC home Coreg 6.25 mg BID, as patient started on Metoprolol for Afib  -Monitor BP, resume home meds if BP elevated    Coronary artery disease involving native coronary artery of native heart without angina pectoris  - Continue home medications: aspirin, statin and Plavix     Left leg cellulitis  - Patient reports having AMADEO (L>R)   - Erythema and warmth noted on physical examination on admission   - Likely related to underlying cellulitis     Plan:   - Completed 5 days of vancomycin  - Improved, cellulitis resolving       Lung cancer, main bronchus, left  - On maintenance chemotherapy and radiation   - Consult oncology: Currently on gemcitabine and radiation therapy. Confusion and lethargy most likely due to hypoxia  - CT head: Remote ischemic infarct right anterior frontal and right parietooccipital junction stable unchanged. No new stroke, hemorrhage, subdural fluid or mass.   - Palliative care team consulted       PAD (peripheral artery disease)  - Continue home medications (aspirin, statin and cilostazol)   - Follow up outpatient with cardiology     Debility  PT/OT consulted and rec: H/H no DME       ACP (advance care planning)  -Palliative team consulted  -Patient remains full code at this  time         VTE Risk Mitigation (From admission, onward)         Ordered     apixaban tablet 5 mg  2 times daily         10/03/22 0908     IP VTE HIGH RISK PATIENT  Once         09/28/22 2203     Place sequential compression device  Until discontinued         09/28/22 2203                Discharge Planning   [place]:      Code Status: Full Code   Is the patient medically ready for discharge?:     Reason for patient still in hospital (select all that apply): Patient trending condition and Treatment  Discharge Plan A: Home Health   Discharge Delays: None known at this time              Kayla Coleman DO  Department of Hospital Medicine   [facility] [place] [facility]

## 2022-10-06 NOTE — PLAN OF CARE
[facility] - [facility]  Discharge Reassessment    Plan for patient discharge to resume [facility] home health, will to accept. Per physician, continue to wean O2. Pt previously on home oxygen. Spoke with patient's spouse, accepting to [facility]  upon discharge. No current dme needs.TN to continue to follow for dc needs.     Primary Care Provider: Guerline Higgins MD    Expected Discharge Date:     Reassessment (most recent)       Discharge Reassessment - 10/06/22 1302          Discharge Reassessment    Assessment Type Discharge Planning Reassessment     Did the patient's condition or plan change since previous assessment? No     Discharge Plan discussed with: Spouse/sig other     Name(s) and Number(s) Natty Iverson (Spouse)   [phone]     Communicated [place] with patient/caregiver Date not available/Unable to determine     Discharge Plan A Home Health     DME Needed Upon Discharge  none (P)      Discharge Barriers Identified None (P)      Why the patient remains in the hospital Requires continued medical care (P)         Post-Acute Status    Post-Acute Authorization Home Health (P)      Home Health Status Pending medical clearance/testing (P)      Discharge Delays None known at this time (P)

## 2022-10-06 NOTE — PLAN OF CARE
Recommendations  Continue cardiac diet as tolerated. Encourage PO intake.  Recommend supplementing Boost Plus TID if PO intake <75%  Monitor nutrition related labs  Goals: Pt to continue receiving >75% EEN by RD f/u  Nutrition Goal Status: new  Communication of RD Recs: other (comment) (POC)    Christina Ayala, MPH, RDN, LDN

## 2022-10-06 NOTE — ASSESSMENT & PLAN NOTE
Patient with Paroxysmal (<7 days) atrial fibrillation which is controlled currently with Beta Blocker. Patient is currently in atrial fibrillation.[id number] Score: 1. HASBLED Score: Unable to calculate. Anticoagulation indicated. Anticoagulation done with Eliquis.    Continue Metoprolol   Continue eliquis

## 2022-10-06 NOTE — ASSESSMENT & PLAN NOTE
Patient with Hypoxic Respiratory failure which is Acute on chronic.  he is on home oxygen at 2 LPM. Supplemental oxygen was provided and noted-  .   Signs/symptoms of respiratory failure include- increased work of breathing and respiratory distress. Contributing diagnoses includes - CHF and COPD Labs and images were reviewed. Patient Has not had a recent ABG. Will treat underlying causes and adjust management of respiratory failure as follows- See plan for heart failure/COPD     -Multifactorial including possible diastolic dysfunction/COPD/Pulmoanry hypertension as well as possible progression of lung CA.   -pulmonology consulted: Increase in O2 requirements are likely due to atrial fibrillation.  Possible COPD exacerbation given bronchiectasis, and lung cancer cause an increased disposition for infection. Treated with antibiotics.   -CTA chest with no PE  -Cardiology consulted for afib: continue metoprolol   -Wean O2 as tolerated, on 5L NC

## 2022-10-06 NOTE — ASSESSMENT & PLAN NOTE
- History of COPD on supplemental O2, 2L NC at home   - Presented with worsening SOB   - Likely related to a COPD flare (in the setting of recent weather changes)     Plan:   - Completed Prednisone 40 mg x 5 days   - Continue home inhalers   - Duo-Nebs scheduled  - Completed 3 days of Augmentin and 5 days of vancomycin   - Wean O2 as tolerated, may be requiring more at baseline now

## 2022-10-07 NOTE — PLAN OF CARE
[facility] - Med Surg  Discharge Final Note      Patient clear to discharge from case management stand point. Reviewed home health with [facility], pt verbalized acceptance. Follow up appointments with cardio and hem/onc scheduled and listed on avs. Pt stated his spouse will be helping him home.     Spoke with patient's spouse, reviewed discharge plan. Confirmed she will bring patient's home oxygen for discharge today.     Primary Care Provider: Guerline Higgins MD    Expected Discharge Date: 10/7/2022    Final Discharge Note (most recent)       Final Note - 10/07/22 1243          Final Note    Assessment Type Final Discharge Note (P)      Anticipated Discharge Disposition Home-Health Care Svc (P)    [facility]    What phone number can be called within the next 1-3 days to see how you are doing after discharge? [phone] (P)      Hospital Resources/Appts/Education Provided Provided patient/caregiver with written discharge plan information;Appointments scheduled and added to AVS (P)         Post-Acute Status    Post-Acute Authorization Home Health (P)      Home Health Status Set-up Complete/Auth obtained (P)      Discharge Delays None known at this time (P)                      Important Message from Medicare  Important Message from Medicare regarding Discharge Appeal Rights: Given to patient/caregiver, Explained to patient/caregiver, Other (comments) ([place] spoke with Natty Iverson (Spouse)   [phone], via phone, verbalized understanding. Copy sent certified to address in chart. [phone])     Date IMM was signed: 10/06/22  Time IMM was signed: 8317

## 2022-10-07 NOTE — PLAN OF CARE
Problem: Adult Inpatient Plan of Care  Goal: Plan of Care Review  Outcome: Ongoing, Progressing  Goal: Patient-Specific Goal (Individualized)  Outcome: Ongoing, Progressing  Goal: Absence of Hospital-Acquired Illness or Injury  Outcome: Ongoing, Progressing  Goal: Optimal Comfort and Wellbeing  Outcome: Ongoing, Progressing  Goal: Readiness for Transition of Care  Outcome: Ongoing, Progressing     Problem: Infection  Goal: Absence of Infection Signs and Symptoms  Outcome: Ongoing, Progressing     Problem: Coping Ineffective  Goal: Effective Coping  Outcome: Ongoing, Progressing     Problem: Fall Injury Risk  Goal: Absence of Fall and Fall-Related Injury  Outcome: Ongoing, Progressing     Problem: Skin Injury Risk Increased  Goal: Skin Health and Integrity  Outcome: Ongoing, Progressing

## 2022-10-07 NOTE — PT/OT/SLP PROGRESS
"Occupational Therapy   Treatment    Name: Kashif Iverson  MRN: [id number]  Admitting Diagnosis:  Acute on chronic respiratory failure with hypoxia       Recommendations:     Discharge Recommendations: home with home health  Discharge Equipment Recommendations:  none  Barriers to discharge:none       Assessment:     Kashif Iverson is a 61 y.o. male with a medical diagnosis of Acute on chronic respiratory failure with hypoxia.  He presents with the following performance deficits affecting function: weakness, impaired endurance, impaired cardiopulmonary response to activity, gait instability.     Rehab Prognosis:  Good; patient would benefit from acute skilled OT services to address these deficits and reach maximum level of function.       Plan:     Patient to be seen 3 x/week to address the above listed problems via self-care/home management, therapeutic activities, therapeutic exercises  Plan of Care Expires: 10/16/22  Plan of Care Reviewed with: patient    Subjective   " I need to go to the bathroom"  Pain/Comfort:  Pain Rating 1: 0/10    Objective:     Communicated with: nurse prior to session.  Patient found HOB elevated with peripheral IV, telemetry, pulse ox (continuous), oxygen upon OT entry to room.    General Precautions: Standard, fall, respiratory   Orthopedic Precautions:N/A   Braces: N/A  Respiratory Status: Nasal cannula, flow 2 L/min   SPO2 decreased to 83% with exertion; recovered to 92% with seated rest.  bpm.     Occupational Performance:     Bed Mobility:    Patient completed Supine to Sit with modified independence     Functional Mobility/Transfers:  Patient completed Sit <> Stand Transfer with supervision  with  no assistive device   Patient completed Toilet Transfer Step Transfer technique with stand by assistance with  no AD  Functional Mobility: Pt able to ambulate household distances to bathroom, sink, chair with SBA, no AD, 2L O2. SPO2 decreased to 83 % with mobility. Pt able to recover to 92% " after seated rest and PLB technique.    Activities of Daily Living:  Grooming: modified independence standing at sink to perform hand hygiene  Toileting: modified independence seated on toilet for clothing management and posterior pericare.      [facility] 6 Click ADL: 24    Treatment & Education:  Bed mobility, functional transfer/mobility , and ADL completed as noted above.   Pt educated on safety awareness with all OOB mobility and ADL .   Encouraged increased OOB activity with nursing staff throughout day.  Required seated rest breaks and PLB technique between tasks for SPO2 recovery.     Patient left up in chair with all lines intact, call button in reach, and nurse Kacie notified    GOALS:   Multidisciplinary Problems       Occupational Therapy Goals          Problem: Occupational Therapy    Goal Priority Disciplines Outcome Interventions   Occupational Therapy Goal     OT, PT/OT Ongoing, Progressing    Description: Goals to be met by: 10/16/22    Patient will increase functional independence with ADLs by performing:    UE Dressing with Modified [place].  LE Dressing with Modified [place].  Grooming while standing at sink with Modified [place].  Toileting from toilet with Modified [place] for hygiene and clothing management.   Stand pivot transfers with Modified [place].  Step transfer with Modified [place]  Toilet transfer to toilet with Modified [place].  Upper extremity exercise program x15 reps per handout, with independence.                         Time Tracking:     OT Date of Treatment: 10/07/22  OT Start Time: 1144  OT Stop Time: 1200  OT Total Time (min): 16 min    Billable Minutes:Self Care/Home Management 16    OT/PURVI: PURVI LOJA Visit Number: 1    10/7/2022

## 2022-10-07 NOTE — PLAN OF CARE
Problem: Occupational Therapy  Goal: Occupational Therapy Goal  Description: Goals to be met by: 10/16/22    Patient will increase functional independence with ADLs by performing:    UE Dressing with Modified [place].  LE Dressing with Modified [place].  Grooming while standing at sink with Modified [place].  Toileting from toilet with Modified [place] for hygiene and clothing management.   Stand pivot transfers with Modified [place].  Step transfer with Modified [place]  Toilet transfer to toilet with Modified [place].  Upper extremity exercise program x15 reps per handout, with independence.    Outcome: Ongoing, Progressing

## 2022-10-07 NOTE — PLAN OF CARE
"[facility]      HOME HEALTH ORDERS  FACE TO FACE ENCOUNTER    Patient Name: Kashif Iverson  YOB: 1960    PCP: Guerline Higgins MD   PCP Address: [address] / [place]  PCP Phone Number: [phone]  PCP Fax: [phone]    Encounter Date: 9/28/22    Admit to Home Health    Diagnoses:  Active Hospital Problems    Diagnosis  POA    *Acute on chronic respiratory failure with hypoxia [J96.21]  Yes     Priority: 1 - High    Persistent atrial fibrillation [I48.19]  Yes     Priority: 2     Chronic combined systolic and diastolic heart failure [I50.42]  Yes     Priority: 3      Chronic    COPD (chronic obstructive pulmonary disease) [J44.9]  Yes     Priority: 4     Essential hypertension [I10]  Yes     Priority: 5      Chronic    Coronary artery disease involving native coronary artery of native heart without angina pectoris [I25.10]  Yes     Priority: 6      Chronic    Left leg cellulitis [L03.116]  Yes     Priority: 7     Lung cancer, main bronchus, left [C34.02]  Yes     Priority: 8      Chronic     8/30/2019 underwent bronchoscopy that showed "A partially obstructing (about 80% obstructed) mass was found in the middle portion in the left mainstem bronchus. The mass was large and bloody, circumferential, endobronchial, friable and necrotic. 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  No actionable mutations and PD-L1 5%.  9/19/21 staging PET CT showed "Hypermetabolic left lung mass concerning for primary pulmonary malignancy with metastatic disease involving the right 6th and 9th ribs as well as mediastinal and left supraclavicular lymph nodes."  Stage IV squamous cell carcinoma of the lung at diagnosis.    10/8/2019-10/21/2019 he received palliative chemoradiation for rib pain with carboplatin and paclitaxel.  He received 3 cycles of carboplatin and paclitaxel.  He developed anaphylactic reaction to " paclitaxel.  The chest was treated with AP:PA fields and the right 9th rib was treated with anterior and posterior oblique fields at 300 cGy per fraction to 3000 cGy.   Lt chest 6X 300 10 / 10 3,000   Rt 9th rib 6X 300 10 / 10 3,000     10/31/2019-9/10/2020 he received pembrolizumab (completed 15 cycles, last dose 8/21/2020)  9/10/2020 PET CT showed progression of disease.  He was then referred to Dr. Key for evaluation for clinical trials.  10/20/2020 he underwent repeat biopsy for LUNG MAP trial and was randomized to Ramucirumab + pembrolizumab.    11/17/2020 started ramucirumab+pembrolizumab.  Completed 4 cycles and then 2/10/2021 scans showed progression.    2/24/2021 he was subsequent started on gemcitabine with Dr. Cruz.      PAD (peripheral artery disease) [I73.9]  Yes     Priority: 9      LUIS FELIPE 3/11/22      Debility [R53.81]  Yes     Priority: 10     ACP (advance care planning) [Z71.89]  Not Applicable     Priority: 11       Resolved Hospital Problems   No resolved problems to display.       Follow Up Appointments:  Future Appointments   Date Time Provider Department Center   10/11/2022  8:40 AM Catia Roman [place] [facility] [place] [facility] [place]   10/11/2022 10:00 AM [facility]IR [facility]   10/14/2022  8:00 AM Guerline Higgins MD [facility] [facility]   10/25/2022 10:00 AM CHAIR 05 [facility]       Allergies:Review of patient's allergies indicates:  No Known Allergies    Medications: Review discharge medications with patient and family and provide education.    Current Facility-Administered Medications   Medication Dose Route Frequency Provider Last Rate Last Admin    acetaminophen tablet 650 mg  650 mg Oral Q6H PRN Celina Davey, NP   650 mg at 09/29/22 0419    albuterol-ipratropium 2.5 mg-0.5 mg/3 mL nebulizer solution 3 mL  3 mL Nebulization Q4H WAKE David Marroquin MD   3 mL at 10/07/22 0808    apixaban tablet 5 mg  5 mg Oral BID Carroll Ambriz MD   5 mg at 10/07/22 1015     aspirin chewable tablet 81 mg  81 mg Oral Daily David Marroquin MD   81 mg at 10/07/22 1016    atorvastatin tablet 80 mg  80 mg Oral Daily David Marroquin MD   80 mg at 10/07/22 1015    cilostazoL tablet 100 mg  100 mg Oral BID David Marroquin MD   100 mg at 10/07/22 1015    clopidogreL tablet 75 mg  75 mg Oral Daily David Marroquin MD   75 mg at 10/07/22 1015    docusate sodium capsule 100 mg  100 mg Oral Daily PRN Carroll Ambriz MD   100 mg at 09/29/22 1750    fluticasone furoate-vilanteroL 100-25 mcg/dose diskus inhaler 1 puff  1 puff Inhalation Daily David Marroquin MD   1 puff at 10/07/22 0807    furosemide tablet 40 mg  40 mg Oral BID Carroll Ambriz MD   40 mg at 10/07/22 1016    glucagon (human recombinant) injection 1 mg  1 mg Intramuscular PRN David Marroquin MD        glucose chewable tablet 16 g  16 g Oral PRN David Marroquin MD        glucose chewable tablet 24 g  24 g Oral PRN David Marroquin MD        metoprolol succinate (TOPROL-XL) 24 hr tablet 50 mg  50 mg Oral BID Robson Green MD   50 mg at 10/07/22 1016    naloxone 0.4 mg/mL injection 0.02 mg  0.02 mg Intravenous PRN David Marroquin MD        sodium chloride 0.9% flush 10 mL  10 mL Intravenous Q12H PRN David Marroquin MD         Current Discharge Medication List        START taking these medications    Details   apixaban (ELIQUIS) 5 mg Tab Take 1 tablet (5 mg total) by mouth 2 (two) times daily.  Qty: 60 tablet, Refills: 1      metoprolol succinate (TOPROL-XL) 50 MG 24 hr tablet Take 1 tablet (50 mg total) by mouth 2 (two) times daily.  Qty: 60 tablet, Refills: 1    Comments: .           CONTINUE these medications which have NOT CHANGED    Details   albuterol (VENTOLIN HFA) 90 mcg/actuation inhaler Inhale 2 puffs into the lungs every 6 (six) hours as needed for Wheezing. Rescue  Qty: 18 g, Refills: 11      ascorbic acid-multivit-min (VITAMIN C ENERGY BOOSTER) 1,000 mg PwEP Take 3,000 mg by mouth once daily. Patient takes 3,000 mg per day      aspirin 81 MG  Chew Take 1 tablet (81 mg total) by mouth once daily.  Qty: 30 tablet, Refills: 11      atorvastatin (LIPITOR) 80 MG tablet Take 1 tablet (80 mg total) by mouth once daily.  Qty: 90 tablet, Refills: 3      cilostazoL (PLETAL) 100 MG Tab Take 1 tablet (100 mg total) by mouth 2 (two) times daily.  Qty: 60 tablet, Refills: 11      clopidogreL (PLAVIX) 75 mg tablet Take 1 tablet (75 mg total) by mouth once daily.  Qty: 30 tablet, Refills: 11      fluticasone-umeclidin-vilanter (TRELEGY ELLIPTA) 100-62.5-25 mcg DsDv Inhale 1 puff into the lungs once daily.  Qty: 1 each, Refills: 1      furosemide (LASIX) 40 MG tablet Take 1 tablet (40 mg total) by mouth 2 (two) times a day.  Qty: 60 tablet, Refills: 2           STOP taking these medications       amLODIPine (NORVASC) 10 MG tablet Comments:   Reason for Stopping:         carvediloL (COREG) 6.25 MG tablet Comments:   Reason for Stopping:         losartan (COZAAR) 100 MG tablet Comments:   Reason for Stopping:         potassium chloride SA (K-DUR,KLOR-CON) 20 MEQ tablet Comments:   Reason for Stopping:         predniSONE (DELTASONE) 10 MG tablet Comments:   Reason for Stopping:                 I have seen and examined this patient within the last 30 days. My clinical findings that support the need for the home health skilled services and home bound status are the following:no   Weakness/numbness causing balance and gait disturbance due to Weakness/Debility and Malignancy/Cancer making it taxing to leave home.     Diet:   cardiac diet      Referrals/ Consults  Physical Therapy to evaluate and treat. Evaluate for home safety and equipment needs; Establish/upgrade home exercise program. Perform / instruct on therapeutic exercises, gait training, transfer training, and Range of Motion.  Occupational Therapy to evaluate and treat. Evaluate home environment for safety and equipment needs. Perform/Instruct on transfers, ADL training, ROM, and therapeutic exercises.    Activities:    activity as tolerated    Nursing:   Agency to admit patient within 24 hours of hospital discharge unless specified on physician order or at patient request    SN to complete comprehensive assessment including routine vital signs. Instruct on disease process and s/s of complications to report to MD. Review/verify medication list sent home with the patient at time of discharge  and instruct patient/caregiver as needed. Frequency may be adjusted depending on start of care date.     Skilled nurse to perform up to 3 visits PRN for symptoms related to diagnosis    Notify MD if SBP > 160 or < 90; DBP > 90 or < 50; HR > 120 or < 50; Temp > 101; O2 < 88%;     Ok to schedule additional visits based on staff availability and patient request on consecutive days within the home health episode.    When multiple disciplines ordered:    Start of Care occurs on Sunday - Wednesday schedule remaining discipline evaluations as ordered on separate consecutive days following the start of care.    Thursday SOC -schedule subsequent evaluations Friday and Monday the following week.     Friday - Saturday SOC - schedule subsequent discipline evaluations on consecutive days starting Monday of the following week.    For all post-discharge communication and subsequent orders please contact patient's primary care physician.     Miscellaneous   Home Oxygen:  Oxygen at 2 L/min nasal canula to be used:  Continuously.    Home Health Aide:  Physical Therapy Three times weekly and Occupational Therapy Three times weekly      I certify that this patient is confined to his home and needs physical therapy and occupational therapy.

## 2022-10-07 NOTE — TELEPHONE ENCOUNTER
Tc to pt  no response message left on VM advising him that appt is cx today due to him being hospitalized  and rescheduled for next Friday at 8:00  Message also sent thru portal

## 2022-10-07 NOTE — DISCHARGE SUMMARY
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     The patient developed worsening shortness of breath one day prior to presentation. The patient reports that his SOB became worse because he was in his yard and was bothered by the heat. His episode was associated with worsening hypoxia documented at home (SpO2: 88%, on 3L, with occasional drops to the high 70s). His wife called the oncology clinic and they were instructed to present to the ED for further management. The patient denies having worsening cough and reports feeling fine at the time of examination. He denied having any chest pain but did report having worsening lower extremity edema. He is compliant with his medications but no longer taking Lasix. No fevers or chills. No urinary or GI symptoms. While in the ER, the patient was saturating 92% on 6L of supplemental O2, but otherwise was hemodynamically stable. Labs showed no leukocytosis (11.1), normocytic anemia (12.5), elevated BNP (447), negative lactate and procalcitonin (0.15). CTA chest showed no evidence of acute pulmonary thromboembolism, and a decrease in right pleural fluid collection with stable consolidated changes in the right and left lungs. He was administered Duo-Nebs, aspirin 325 mg and ceftriaxone. He was admitted for further management.       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  Required increased supplemental O2. Started on treatment.  Pulmonary consulted-suspect due to Afib with RVR. Weaning down O2.  Palliative care was consulted as well. Cards consulted for afib RVR- continue po metoprolol with IV as needed. EF 60% on echo. Switch to DOAC prior to discharge. PT/OT consulted as well and rec: H/H without DME.  Patient continues to have intermittent confusion. Concern for possible mets to the brain. Oncology consulted-suspect confusion due to hypoxia. CT head showed Remote ischemic infarct right anterior frontal and right parietooccipital junction stable unchanged. No new stroke, hemorrhage, subdural fluid or mass. Mental status improving, appears at  baseline.  Patient was able to lean over to down to 2 L nasal cannula, which is his home oxygen.    Patient states he is doing better.  States that shortness of breath is at baseline, and denies any difficulty breathing.  Currently on 2 L nasal cannula.  Wants to really go home.  Alert and oriented x4. Pt denies any fever, headaches, vision changes, chest pain, palpitations, abdominal pain, nausea, vomiting, or any new weaknesses. Feels ready to go home. Patient's exam on discharge was as follow: Patient is alert and oriented, appears in no acute distress, heart with regular rate and irregular rhythm, lungs with diminished breath sounds, abdomen soft and nontender, and no new weaknesses or focal deficits seen. Bilateral lower extremities without any edema or calf tenderness.     Patient was counseled regarding any abnormal labs, differential diagnosis, treatment options, risk-benefit, lifestyle changes, prognosis,  current condition, and medications. Patient was interactive and attentive.  Patient's questions were answered in a respectful and timely manner. Patient was instructed to follow-up with PCP within 1 week and to continue taking medications as prescribed.  Instructed to also follow up with cardiology outpatient. Also, extensively discussed the risks, benefits, and side effects of patient's medications. Discussed with patient about any medication changes. Patient verbalized understanding and agrees to treatment plan.  Patient is stable for discharge.  Patient has no other questions or concerns at this time.  ED precautions discussed with the patient.    Vital signs are stable. Ambulating without any difficulty. Tolerating p.o. intake without any nausea or vomiting. Afebrile for over 24 hours. Patient is in stable condition and has no questions or concerns. Patient will be discharge to home with home health once that is set up and transportation is secured. Prescriptions sent to pharmacy.  CM/SW to assist with discharge planning.          Goals of Care Treatment Preferences:  Code Status: Full Code          What is most important right now is to focus on spending time at home, avoiding the hospital, remaining as independent as possible, symptom/pain control, quality of life, even if it means sacrificing a little time.  Accordingly, we have decided that the best plan to meet the patient's goals includes enrolling in hospice care.      Consults:   Consults (From admission, onward)        Status Ordering Provider     Inpatient consult to Hematology/Oncology - [facility]  Once        Provider:  Guerline Higgins MD    Completed SHLOMO ZAMUDIO-DEVENDRA T.     Inpatient consult to Cardiology  Once        Provider:  Robson Green MD    Completed AIDEN MYERS     Inpatient consult to Palliative Care  Once        Provider:  (Not yet assigned)    Completed AIDEN MYERS     Inpatient consult to Spiritual Care  Once         Provider:  (Not yet assigned)    Completed AIDEN MYERS     Inpatient consult to Pulmonology  Once        Provider:  Oumar Danielson MD    Completed AIDEN MYERS          No new Assessment & Plan notes have been filed under this hospital service since the last note was generated.  Service: Hospital Medicine    Final Active Diagnoses:    Diagnosis Date Noted POA    PRINCIPAL PROBLEM:  Acute on chronic respiratory failure with hypoxia [J96.21] 06/10/2022 Yes    Persistent atrial fibrillation [I48.19] 09/30/2022 Yes    Chronic combined systolic and diastolic heart failure [I50.42] 02/23/2022 Yes     Chronic    COPD (chronic obstructive pulmonary disease) [J44.9] 09/05/2022 Yes    Essential hypertension [I10]  Yes     Chronic    Coronary artery disease involving native coronary artery of native heart without angina pectoris [I25.10] 03/14/2022 Yes     Chronic    Left leg cellulitis [L03.116] 09/28/2022 Yes    Lung cancer, main bronchus, left [C34.02] 09/10/2019 Yes     Chronic    PAD (peripheral artery disease) [I73.9] 03/14/2022 Yes    Debility [R53.81] 10/03/2022 Yes    ACP (advance care planning) [Z71.89] 10/03/2022 Not Applicable      Problems Resolved During this Admission:       Discharged Condition: stable    Disposition: Home or Self Care    Follow Up:   Follow-up Information     Guerline Higgins MD .    Specialty: Hematology and Oncology  Contact information:  [address] [facility] [address]  [address]  [place] [address]  [phone]             [facility] Follow up.    Specialties: DME Provider, Home Health Services  Why: Home Health  Contact information:  [address]  [address]  [place] [address]  [phone]                       Patient Instructions:      Ambulatory referral/consult to CLINIC Palliative Care   Standing Status: Future   Referral Priority: Routine Referral Type: Consultation   Referred to Provider: AVNI TOLLIVER Requested Specialty: Palliative Medicine    Number of Visits Requested: 1     Diet Cardiac     Notify your health care provider if you experience any of the following:  difficulty breathing or increased cough     Notify your health care provider if you experience any of the following:  persistent dizziness, light-headedness, or visual disturbances     Notify your health care provider if you experience any of the following:  increased confusion or weakness     Notify your health care provider if you experience any of the following:  severe uncontrolled pain     Activity as tolerated       Significant Diagnostic Studies:   Imaging Results          CTA Chest Non-Coronary (PE Studies) (Final result)  Result time 09/28/22 19:01:46    Final result by Luis Enrique Schroeder MD (09/28/22 19:01:46)                 Impression:      1. No evidence of acute pulmonary thromboembolism.  2. Decrease in right pleural fluid collection.  3. Stable consolidated changes in the right and left lungs as described.      Electronically signed by: Luis Enrique Schroeder  Date:    09/28/2022  Time:    19:01             Narrative:    EXAMINATION:  CTA CHEST NON CORONARY (PE STUDIES)    CLINICAL HISTORY:  Pulmonary embolism (PE) suspected, high prob;    TECHNIQUE:  Following the intravenous administration of 75 cc of Omnipaque 350 contrast material, 1.25 mm contiguous axial images were acquired through the chest utilizing the CT pulmonary angiogram protocol.  2-D coronal and sagittal reconstructed images of the chest and sagittal oblique MIP images of the pulmonary arterial system were generated from the source data.    COMPARISON:  06/10/2022.    FINDINGS:  Pulmonary arterial system: This is a good quality examination for the evaluation of pulmonary thromboembolism with good opacification of the pulmonary arteries to the level of the segmental branches of the pulmonary arterial system. There is no evidence of acute pulmonary thromboembolism. No large saddle pulmonary embolism, enlargement of  the main pulmonary artery, or secondary findings of right ventricular strain.    Aorta and heart: The heart is normal in size.  No pericardial fluid.  No thoracic aortic aneurysm.  No thoracic aortic dissection.    Airways: Unremarkable.    Lymph nodes: No pathologically enlarged lymph nodes in the chest or axilla.    Lungs: Consolidation in the anterior segment of the left upper lobe, left lower lobe, lingula and right lower lobe with right pleural effusion is present.  Pleural effusion on the right has decreased but the parenchymal opacities in the left lung appear stable.    Visualized upper abdominal soft tissues: No significant abnormalities appreciated.    Bones: There is degenerative change of the thoracic spine.                               US Lower Extremity Veins Bilateral (Final result)  Result time 09/28/22 17:31:20    Final result by Veda Sullivan MD (09/28/22 17:31:20)                 Impression:      No evidence of deep venous thrombosis in either lower extremity.      Electronically signed by: Veda Sullivan  Date:    09/28/2022  Time:    17:31             Narrative:    EXAMINATION:  ULTRASOUND LOWER EXTREMITY VEINS BILATERAL    CLINICAL HISTORY:  Other specified soft tissue disorders    TECHNIQUE:  Duplex and color flow Doppler and dynamic compression was performed of the bilateral lower extremity veins was performed.    COMPARISON:  06/10/2022    FINDINGS:  Right thigh veins: The common femoral, femoral, popliteal, upper greater saphenous, and deep femoral veins are patent and free of thrombus. The veins are normally compressible and have normal phasic flow and augmentation response.    Right calf veins: The visualized calf veins are patent.    Left thigh veins: The common femoral, femoral, popliteal, upper greater saphenous, and deep femoral veins are patent and free of thrombus. The veins are normally compressible and have normal phasic flow and augmentation response.    Left calf veins:  The visualized calf veins are patent.    Miscellaneous: None                               X-Ray Chest AP Portable (Final result)  Result time 09/28/22 16:36:55    Final result by Sofie Ackerman MD (09/28/22 16:36:55)                 Impression:      Larger area of increased opacity left upper lobe could represent superimposed infection or progression of disease.      Electronically signed by: Sofie Ackerman MD  Date:    09/28/2022  Time:    16:36             Narrative:    EXAMINATION:  XR CHEST AP PORTABLE    CLINICAL HISTORY:  Chest Pain;    TECHNIQUE:  Single frontal view of the chest was performed.    COMPARISON:  09/04/2022    FINDINGS:  There is a port in the right upper thorax, distal tip in the SVC.  The cardiomediastinal silhouette is midline.  Abnormal increased attenuation left upper lobe and patchy increased attenuation at the bilateral lung basis.  Larger area of opacity left upper lobe compared to 06/10/2022 and 09/04/2022 could be due to a superimposed infectious process or progression of disease.  There is blunting of the left costal diaphragmatic angle which could indicate a small pleural effusion or pleural thickening, similar to the prior exam.  There is no pneumothorax.  The osseous structures appear normal.                                  Pending Diagnostic Studies:     None         Medications:  Reconciled Home Medications:      Medication List      START taking these medications    ELIQUIS 5 mg Tab  Generic drug: apixaban  Take 1 tablet (5 mg total) by mouth 2 (two) times daily.     metoprolol succinate 50 MG 24 hr tablet  Commonly known as: TOPROL-XL  Take 1 tablet (50 mg total) by mouth 2 (two) times daily.        CONTINUE taking these medications    albuterol 90 mcg/actuation inhaler  Commonly known as: VENTOLIN HFA  Inhale 2 puffs into the lungs every 6 (six) hours as needed for Wheezing. Rescue     aspirin 81 MG Chew  Take 1 tablet (81 mg total) by mouth once daily.      atorvastatin 80 MG tablet  Commonly known as: LIPITOR  Take 1 tablet (80 mg total) by mouth once daily.     cilostazoL 100 MG Tab  Commonly known as: PLETAL  Take 1 tablet (100 mg total) by mouth 2 (two) times daily.     clopidogreL 75 mg tablet  Commonly known as: PLAVIX  Take 1 tablet (75 mg total) by mouth once daily.     fluticasone-umeclidin-vilanter 100-62.5-25 mcg Dsdv  Commonly known as: TRELEGY ELLIPTA  Inhale 1 puff into the lungs once daily.     furosemide 40 MG tablet  Commonly known as: LASIX  Take 1 tablet (40 mg total) by mouth 2 (two) times a day.     VITAMIN C ENERGY BOOSTER 1,000 mg Pwep  Generic drug: ascorbic acid-multivit-min  Take 3,000 mg by mouth once daily. Patient takes 3,000 mg per day        STOP taking these medications    amLODIPine 10 MG tablet  Commonly known as: NORVASC     carvediloL 6.25 MG tablet  Commonly known as: COREG     losartan 100 MG tablet  Commonly known as: COZAAR     potassium chloride SA 20 MEQ tablet  Commonly known as: K-SEVERINO SANCHEZ-CON     predniSONE 10 MG tablet  Commonly known as: DELTASONE            Indwelling Lines/Drains at time of discharge:   Lines/Drains/Airways     Central Venous Catheter Line  Duration           Port A Cath Single Lumen right subclavian -- days                Time spent on the discharge of patient: Greater than 35 minutes         Kayla Coleman DO  Department of Hospital Medicine  [facility] Surg

## 2022-10-07 NOTE — PLAN OF CARE
Plan for discharge with [facility] home health. Plan of care orders sent to [facility] via care port. Placed call to patient's spouse, left detailed voice message. [place] to follow for dc needs.    10/07/22 1104   Post-Acute Status   Post-Acute Authorization Home Health   Home Health Status Pending post-acute provider review/more information requested   Discharge Delays None known at this time   Discharge Plan   Discharge Plan A Home Health   Discharge Plan B Home with family

## 2022-10-07 NOTE — NURSING
Meds delivered to bedside. IV taken out and in tact. Tele removed and returned to dirty bin. Discharge instructions given, verbalized understanding. Awaiting wife for family transport for discharge.

## 2022-10-11 NOTE — PROGRESS NOTES
CARDIOVASCULAR CONSULTATION    REASON FOR CONSULT:   Kashif Iverson is a 61 y.o. male who presents for follow-up.      HISTORY OF PRESENT ILLNESS:     Patient is a pleasant 61-year-old male.  Saw Dr. Green and me in the hospital, now wants to follow up with me.  During the hospital stay Dr. Green had performed a coronary angiogram.  That demonstrated nonobstructive CAD.  He was admitted with shock and he when she has recovered and doing fine.  Denies any chest pains at rest on exertion, orthopnea, PND.  Has history of lung cancer and lung mass.  EF on echo was 35-40%.      Notes April 2022:  Patient here for follow-up.  Denies any chest pains at rest on exertion, orthopnea, PND.    May 22:  Patient here for.  Denies chest pains at rest on exertion.  Does have claudication but cannot tell me which leg is worse.  He states he will have to walk and figure out which leg hurts more.  Denies any resting leg pains or ulcers.       The left ventricle is normal in size with concentric hypertrophy and normal systolic function.  The estimated ejection fraction is 65%.  Indeterminate left ventricular diastolic function.  Mild right ventricular enlargement with normal right ventricular systolic function.  Mild left atrial enlargement.  Mild right atrial enlargement.  Mild tricuspid regurgitation.  Normal central venous pressure (3 mmHg).  The estimated PA systolic pressure is 53 mmHg.  There is pulmonary hypertension.        Right leg:  >50% PFA stenosis  Mid SFA occlusion  Moderately decreased LUIS FELIPE, 0.48.  Bi/monophasic waveforms.     Left leg:  >50% CFA stenosis  Biphasic waveforms  Borderline normal LUIS FELIPE, 0.98.      Suprarenal aortic ectasia without evidence of AAA, max diam 2.7cm.  Aortic atherosclerosis.  Increased flow velocity across L MORIAH suggesting >50% stenosis.        Right resting LUIS FELIPE 0.29, is suggestive of severe right lower extremity arterial disease sufficient to cause rest pain.  Left resting LUIS FELIPE 0.92, is  suggestive of minimal left lower extremity arterial disease.  PVR waveforms are abnormal at the calf and ankle level on the right.      Notes from July 2022: Patient here for follow-up.  Main complaint is dyspnea on exertion.  No chest pain or   Lifestyle limiting claudication    Oct 22: doing fine. No cp, orthopnea, pnd. No claudication.    PAST MEDICAL HISTORY:     Past Medical History:   Diagnosis Date    Combined systolic and diastolic heart failure     Hypertension     Lung cancer     NSCLC    Lung mass     left lung       PAST SURGICAL HISTORY:     Past Surgical History:   Procedure Laterality Date    BRONCHOSCOPY N/A 8/30/2019    Procedure: Bronchoscopy;  Surgeon: Miguel Diagnostic Provider;  Location: [facility] OR [address] [place] [address];  Service: Anesthesiology;  Laterality: N/A;    CORONARY ANGIOGRAPHY N/A 3/4/2022    Procedure: ANGIOGRAM, CORONARY ARTERY;  Surgeon: Robson Green MD;  Location: [facility] CATH LAB;  Service: Cardiology;  Laterality: N/A;       ALLERGIES AND MEDICATION:   Review of patient's allergies indicates:  No Known Allergies     Medication List            Accurate as of October 11, 2022  8:43 AM. If you have any questions, ask your nurse or doctor.                CONTINUE taking these medications      albuterol 90 mcg/actuation inhaler  Commonly known as: VENTOLIN HFA  Inhale 2 puffs into the lungs every 6 (six) hours as needed for Wheezing. Rescue     aspirin 81 MG Chew  Take 1 tablet (81 mg total) by mouth once daily.     atorvastatin 80 MG tablet  Commonly known as: LIPITOR  Take 1 tablet (80 mg total) by mouth once daily.     cilostazoL 100 MG Tab  Commonly known as: PLETAL  Take 1 tablet (100 mg total) by mouth 2 (two) times daily.     clopidogreL 75 mg tablet  Commonly known as: PLAVIX  Take 1 tablet (75 mg total) by mouth once daily.     ELIQUIS 5 mg Tab  Generic drug: apixaban  Take 1 tablet (5 mg total) by mouth 2 (two) times daily.     fluticasone-umeclidin-vilanter 100-62.5-25 mcg Dsdv  Commonly  known as: TRELEGY ELLIPTA  Inhale 1 puff into the lungs once daily.     furosemide 40 MG tablet  Commonly known as: LASIX  Take 1 tablet (40 mg total) by mouth 2 (two) times a day.     metoprolol succinate 50 MG 24 hr tablet  Commonly known as: TOPROL-XL  Take 1 tablet (50 mg total) by mouth 2 (two) times daily.     VITAMIN C ENERGY BOOSTER 1,000 mg Pwep  Generic drug: ascorbic acid-multivit-min              SOCIAL HISTORY:     Social History     Socioeconomic History    Marital status:    Tobacco Use    Smoking status: Former     Packs/day: 1.00     Years: 25.00     Pack years: 25.00     Types: Cigarettes     Quit date:      Years since quittin.7    Smokeless tobacco: Never   Substance and Sexual Activity    Alcohol use: Yes     Comment: ocassionally    Drug use: Never    Sexual activity: Yes     Partners: Female     Social Determinants of Health     Financial Resource Strain: Medium Risk    Difficulty of Paying Living Expenses: Somewhat hard   Food Insecurity: No Food Insecurity    Worried About Running Out of Food in the Last Year: Never true    Ran Out of Food in the Last Year: Never true   Transportation Needs: Unmet Transportation Needs    Lack of Transportation (Medical): Yes    Lack of Transportation (Non-Medical): Yes   Physical Activity: Insufficiently Active    Days of Exercise per Week: 3 days    Minutes of Exercise per Session: 10 min   Stress: Stress Concern Present    Feeling of Stress : To some extent   Social Connections: Unknown    Frequency of Communication with Friends and Family: Once a week    Frequency of Social Gatherings with Friends and Family: Never    Active Member of Clubs or Organizations: No    Attends Club or Organization Meetings: Never    Marital Status:    Housing Stability: High Risk    Unable to Pay for Housing in the Last Year: Yes    Number of Places Lived in the Last Year: 1    Unstable Housing in the Last Year: No       FAMILY HISTORY:     Family History  "  Problem Relation Age of Onset    Diabetes Mother     Heart defect Mother     Cancer Father     Cancer Sister     No Known Problems Son        REVIEW OF SYSTEMS:   Review of Systems   Constitutional: Negative.   HENT: Negative.    Eyes: Negative.    Cardiovascular: Negative.    Respiratory: Negative.    Endocrine: Negative.    Hematologic/Lymphatic: Negative.    Skin: Negative.    Musculoskeletal: Negative.    Gastrointestinal: Negative.    Genitourinary: Negative.    Neurological: Negative.    Psychiatric/Behavioral: Negative.    Allergic/Immunologic: Negative.        A 10 point review of systems was performed and all the pertinent positives have been mentioned. Rest of review of systems was negative.        PHYSICAL EXAM:     Vitals:    10/11/22 0834   BP: (!) 187/86   Pulse: 87   Resp: 18    Body mass index is 27.84 kg/m².  Weight: 88 kg (194 lb 0.1 oz)   Height: 5' 10" (177.8 cm)     Physical Exam  Vitals reviewed.   Constitutional:       Appearance: He is well-developed.   HENT:      Head: Normocephalic.   Eyes:      Conjunctiva/sclera: Conjunctivae normal.      Pupils: Pupils are equal, round, and reactive to light.   Cardiovascular:      Rate and Rhythm: Normal rate and regular rhythm.      Heart sounds: Normal heart sounds.   Pulmonary:      Effort: Pulmonary effort is normal.      Breath sounds: Normal breath sounds.   Abdominal:      General: Bowel sounds are normal.      Palpations: Abdomen is soft.   Musculoskeletal:      Cervical back: Normal range of motion and neck supple.   Skin:     General: Skin is warm.   Neurological:      Mental Status: He is alert and oriented to person, place, and time.           DATA:     Laboratory:  CBC:  Recent Labs   Lab 09/23/22  0904 09/28/22  1616 09/29/22  0523   WBC 8.47 11.11 12.22   Hemoglobin 13.1 L 12.5 L 13.0 L   Hematocrit 39.3 L 37.7 L 38.4 L   Platelets 209 193 151       CHEMISTRIES:  Recent Labs   Lab 06/23/22  0847 06/28/22  1316 07/19/22  1317 " 08/02/22  1329 09/30/22  1033 10/01/22  0600 10/04/22  0441 10/06/22  0402 10/07/22  0603   Glucose 124 H 139 H 163 H   < >  --    < > 153 H 126 H 152 H   Sodium 140 140 141   < >  --    < > 140 140 140   Potassium 3.1 L 3.2 L 3.4 L   < >  --    < > 3.1 L 3.1 L 3.6   BUN 13 11 11   < >  --    < > 24 H 15 13   Creatinine 0.9 0.9 1.1   < >  --    < > 0.9 0.8 0.8   eGFR if African American >60.0 >60 >60  --   --   --   --   --   --    eGFR if non African American >60.0 >60 >60  --   --   --   --   --   --    Calcium 8.8 8.7 8.4 L   < >  --    < > 8.2 L 8.4 L 8.5 L   Magnesium  --   --   --    < > 1.7  --   --  1.5 L 1.6    < > = values in this interval not displayed.       CARDIAC BIOMARKERS:  Recent Labs   Lab 06/10/22  1435 09/04/22  0950 09/28/22  1616   CPK  --  52  --    CPK MB  --  5.1  --    Troponin I 0.069 H 0.032 0.021       COAGS:  Recent Labs   Lab 02/22/22  1452 03/04/22  0424 06/10/22  1438   INR 1.2 1.1 1.1       LIPIDS/LFTS:  Recent Labs   Lab 09/09/20  0956 09/28/20  0722 08/17/21  0733 08/24/21  0846 02/23/22  0407 02/23/22  1258 09/04/22  0950 09/23/22  0904 09/28/22  1616   Cholesterol 183  --  186  --  125  --   --   --   --    Triglycerides 87  --  172 H  --  224 H  --   --   --   --    HDL 33 L  --  38 L  --  26 L  --   --   --   --    LDL Cholesterol 132.6  --  113.6  --  54.2 L  --   --   --   --    Non-HDL Cholesterol 150  --  148  --  99  --   --   --   --    AST 18   < > 20   < > 29   < > 20 13 13   ALT 24   < > 26   < > 19   < > 14 13 10    < > = values in this interval not displayed.       Hemoglobin A1C   Date Value Ref Range Status   02/23/2022 5.0 4.0 - 5.6 % Final     Comment:     ADA Screening Guidelines:  5.7-6.4%  Consistent with prediabetes  >or=6.5%  Consistent with diabetes    High levels of fetal hemoglobin interfere with the HbA1C  assay. Heterozygous hemoglobin variants (HbS, HgC, etc)do  not significantly interfere with this assay.   However, presence of multiple variants may  affect accuracy.     08/17/2021 6.0 (H) 4.0 - 5.6 % Final     Comment:     ADA Screening Guidelines:  5.7-6.4%  Consistent with prediabetes  >or=6.5%  Consistent with diabetes    High levels of fetal hemoglobin interfere with the HbA1C  assay. Heterozygous hemoglobin variants (HbS, HgC, etc)do  not significantly interfere with this assay.   However, presence of multiple variants may affect accuracy.     09/04/2019 5.6 4.0 - 5.6 % Final     Comment:     ADA Screening Guidelines:  5.7-6.4%  Consistent with prediabetes  >or=6.5%  Consistent with diabetes  High levels of fetal hemoglobin interfere with the HbA1C  assay. Heterozygous hemoglobin variants (HbS, HgC, etc)do  not significantly interfere with this assay.   However, presence of multiple variants may affect accuracy.         TSH  Recent Labs   Lab 11/05/20  1107 01/20/21  1148 02/22/22  1452   TSH 0.575 0.888 1.310       The ASCVD Risk score (Rosendo DK, et al., 2019) failed to calculate for the following reasons:    The patient has a prior MI or stroke diagnosis             ASSESSMENT AND PLAN     Patient Active Problem List   Diagnosis    Former smoker    Multiple pulmonary nodules    Macrocytic anemia    History of completed stroke    Poor dentition    Lung cancer, main bronchus, left    Secondary malignant neoplasm of bone    Phlebitis after infusion    Immunodeficiency due to drugs    Atherosclerosis of aorta    Bronchiectasis without complication    Essential hypertension    History of non-ST elevation myocardial infarction (NSTEMI)    Palliative care encounter    Multifocal atrial tachycardia    Chronic combined systolic and diastolic heart failure    PAD (peripheral artery disease)    Coronary artery disease involving native coronary artery of native heart without angina pectoris    Goals of care, counseling/discussion    Acute on chronic respiratory failure with hypoxia    Dependence on supplemental oxygen    Chronic respiratory failure with hypoxia     COPD (chronic obstructive pulmonary disease)    Acute respiratory failure with hypoxia and hypercarbia    Left leg cellulitis    Persistent atrial fibrillation    Debility    ACP (advance care planning)         Nonischemic cardiomyopathy:  EF 35-40%.  Catheterization showed nonobstructive CAD.  Currently euvolemic.  Continue long-acting beta blockers and losartan.    Hypertension:  uncontrolled. Add norvasc 10 mg daily.    Nonobstructive CAD:  Aggressive risk factor modification    Abnormal ABIs.  Check peripheral ultrasound.  Peripheral ultrasound revealed greater than 50% left common iliac stenosis, left SFA disease, chronic total occlusion of right SFA.on pletal.  Currently denies any lifestyle limiting claudication.  Denies any ulcers on his feet.Consider revascularization if patient continues to lifestyle limiting claudication despite maximum medical management and walking therapy.    Right resting LUIS FELIPE 0.29, is suggestive of severe right lower extremity arterial disease sufficient to cause rest pain.  Left resting LUIS FELIPE 0.92, is suggestive of minimal left lower extremity arterial disease.  PVR waveforms are abnormal at the calf and ankle level on the right.        Follow-up in 1 month        Thank you very much for involving me in the care of your patient.  Please do not hesitate to contact me if there are any questions.      Catia Roman MD, FACC, [facility]  Interventional Cardiologist, [facility].           This note was dictated with the help of speech recognition software.  There might be un-intended errors and/or substitutions.

## 2022-10-14 NOTE — Clinical Note
-RTC 10/25 for gem tx with MD visit and labs day of or day prior with CMP, CBC, Mg with Dr Higgins -CT scan ordered for Mid Nov

## 2022-10-14 NOTE — PROGRESS NOTES
"  PATIENT: Kashif Iverson  MRN: [id number]  DATE: 10/14/2022      Diagnosis:   No diagnosis found.        Chief Complaint: No chief complaint on file.      Oncologic History:    Oncologic History 1. 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 Palliative chemoradiation with carboplatin and paclitaxel; anaphylactic reaction to paclitaxel  2. Pembrolizumab  3. Pembrolizumab+ramucirumab (LungMAP trial)  4. Gemcitabine    Pathology Squamous cell carcinoma, no actionable mutations, PD-L1 5%        Subjective:    Interval History: Mr. Iverson returns for follow up.     8/30/2019 underwent bronchoscopy that showed "A partially obstructing (about 80% obstructed) mass was found in the middle portion in the left mainstem bronchus. The mass was large and bloody, circumferential, endobronchial, friable and necrotic. 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  No actionable mutations and PD-L1 5%.  9/19/21 staging PET CT showed "Hypermetabolic left lung mass concerning for primary pulmonary malignancy with metastatic disease involving the right 6th and 9th ribs as well as mediastinal and left supraclavicular lymph nodes."  Stage IV squamous cell carcinoma of the lung at diagnosis.    10/8/2019-10/21/2019 he received palliative chemoradiation for rib pain with carboplatin and paclitaxel.  He received 3 cycles of carboplatin and paclitaxel.  He developed anaphylactic reaction to paclitaxel.  The chest was treated with AP:PA fields and the right 9th rib was treated with anterior and posterior oblique fields at 300 cGy per fraction to 3000 cGy.   Lt chest 6X 300 10 / 10 3,000   Rt 9th rib 6X 300 10 / 10 3,000     10/31/2019-9/10/2020 he received pembrolizumab (completed 15 cycles, last dose 8/21/2020)  9/10/2020 PET CT showed progression of disease.  He was then referred to Dr. Key for evaluation for clinical trials.  " 10/20/2020 he underwent repeat biopsy for LUNG MAP trial and was randomized to Ramucirumab + pembrolizumab.    11/17/2020 started ramucirumab+pembrolizumab.  Completed 4 cycles and then 2/10/2021 scans showed progression.    2/24/2021 he was subsequent started on gemcitabine with Dr. Cruz.  His care was transferred to Dr. Romo who continued his current regimen and now is currently under my care.   5/17/22-5/22/22 admitted for acute respiratory failure secondary to heart failure exacerbation requiring intubation.  He was extubated and diuresed successfully.  8/15/22-8/20/22 admitted for acute hypoxic respiratory failure secondary to heart failure; diuresed and discharged with supplemental oxygen        Interval hx:      Pt feels well and denied any issues at time of visit post hospitalization.  BP was high but pt asymptomatic and just took them before arriving to clinic.  Pt educated on  maintaining fluid balance and will follow up at next tx apt.      Wife accompanies him at this visit.    Past Medical History:   Past Medical History:   Diagnosis Date    Combined systolic and diastolic heart failure     Hypertension     Lung cancer     NSCLC    Lung mass     left lung       Past Surgical HIstory:   Past Surgical History:   Procedure Laterality Date    BRONCHOSCOPY N/A 8/30/2019    Procedure: Bronchoscopy;  Surgeon: [facility] Diagnostic Provider;  Location: [facility] OR [address] [place] [address];  Service: Anesthesiology;  Laterality: N/A;    CORONARY ANGIOGRAPHY N/A 3/4/2022    Procedure: ANGIOGRAM, CORONARY ARTERY;  Surgeon: Robson Green MD;  Location: [facility] CATH LAB;  Service: Cardiology;  Laterality: N/A;       Family History:   Family History   Problem Relation Age of Onset    Diabetes Mother     Heart defect Mother     Cancer Father     Cancer Sister     No Known Problems Son        Social History:  reports that he quit smoking about 2 years ago. His smoking use included cigarettes. He has a 25.00 pack-year smoking history. He has  never used smokeless tobacco. He reports current alcohol use. He reports that he does not use drugs.    Allergies:  Review of patient's allergies indicates:  No Known Allergies    Medications:  Current Outpatient Medications   Medication Sig Dispense Refill    albuterol (VENTOLIN HFA) 90 mcg/actuation inhaler Inhale 2 puffs into the lungs every 6 (six) hours as needed for Wheezing. Rescue 18 g 11    amLODIPine (NORVASC) 10 MG tablet Take 1 tablet (10 mg total) by mouth once daily. 90 tablet 3    apixaban (ELIQUIS) 5 mg Tab Take 1 tablet (5 mg total) by mouth 2 (two) times daily. 60 tablet 1    ascorbic acid-multivit-min (VITAMIN C ENERGY BOOSTER) 1,000 mg PwEP Take 3,000 mg by mouth once daily. Patient takes 3,000 mg per day      aspirin 81 MG Chew Take 1 tablet (81 mg total) by mouth once daily. 30 tablet 11    atorvastatin (LIPITOR) 80 MG tablet Take 1 tablet (80 mg total) by mouth once daily. 90 tablet 3    cilostazoL (PLETAL) 100 MG Tab Take 1 tablet (100 mg total) by mouth 2 (two) times daily. 60 tablet 11    clopidogreL (PLAVIX) 75 mg tablet Take 1 tablet (75 mg total) by mouth once daily. 30 tablet 11    fluticasone-umeclidin-vilanter (TRELEGY ELLIPTA) 100-62.5-25 mcg DsDv Inhale 1 puff into the lungs once daily. 1 each 1    furosemide (LASIX) 40 MG tablet Take 1 tablet (40 mg total) by mouth 2 (two) times a day. 60 tablet 2    metoprolol succinate (TOPROL-XL) 50 MG 24 hr tablet Take 1 tablet (50 mg total) by mouth 2 (two) times daily. 60 tablet 1     No current facility-administered medications for this visit.       Review of Systems   Constitutional:  Negative for activity change, appetite change, chills, diaphoresis, fatigue, fever and unexpected weight change.   HENT:  Negative for nosebleeds and trouble swallowing.    Eyes:  Negative for visual disturbance.   Respiratory:  Negative for cough, chest tightness, shortness of breath and wheezing.    Cardiovascular:  Negative for chest pain and leg swelling.    Gastrointestinal:  Negative for abdominal distention, abdominal pain, blood in stool, constipation, diarrhea, nausea and vomiting.   Endocrine: Negative for cold intolerance and heat intolerance.   Genitourinary:  Negative for difficulty urinating and dysuria.   Musculoskeletal:  Negative for arthralgias, back pain and myalgias.   Skin:  Negative for color change.   Neurological:  Negative for dizziness, weakness, light-headedness, numbness and headaches.   Hematological:  Negative for adenopathy. Bruises/bleeds easily.   Psychiatric/Behavioral:  Negative for confusion.      ECOG Performance Status:     ECOG SCORE             Objective:      Vitals:   There were no vitals filed for this visit.      BMI: There is no height or weight on file to calculate BMI.    Physical Exam  Constitutional:       General: He is not in acute distress.     Appearance: Normal appearance. He is not ill-appearing.   HENT:      Head: Normocephalic and atraumatic.      Nose: Nose normal.      Mouth/Throat:      Pharynx: No oropharyngeal exudate or posterior oropharyngeal erythema.   Eyes:      General: No scleral icterus.     Extraocular Movements: Extraocular movements intact.      Conjunctiva/sclera: Conjunctivae normal.      Pupils: Pupils are equal, round, and reactive to light.   Cardiovascular:      Rate and Rhythm: Normal rate and regular rhythm.      Heart sounds: No murmur heard.    No friction rub. No gallop.      Comments: Right chest wall port c/d/i  Pulmonary:      Effort: Pulmonary effort is normal. No respiratory distress.      Breath sounds: No stridor. No wheezing, rhonchi or rales.   Abdominal:      General: Bowel sounds are normal. There is no distension.      Palpations: Abdomen is soft. There is no mass.      Tenderness: There is no abdominal tenderness. There is no guarding or rebound.   Musculoskeletal:         General: No swelling or tenderness. Normal range of motion.      Cervical back: Normal range of motion  and neck supple.      Right lower leg: No edema.      Left lower leg: No edema.   Skin:     General: Skin is warm and dry.      Findings: No bruising.   Neurological:      General: No focal deficit present.      Mental Status: He is alert.       Laboratory Data:   No results found for this or any previous visit (from the past 168 hour(s)).          Imaging:       CT Chest Abdomen Pelvis With Contrast (xpd)    Result Date: 8/25/2022  EXAMINATION: CT CHEST ABDOMEN PELVIS WITH CONTRAST (XPD) CLINICAL HISTORY: metastatic lung cancer on gemcitabine, eval response; Malignant neoplasm of left main bronchus TECHNIQUE: Low dose axial images, sagittal and coronal reformations were obtained from the thoracic inlet to the pubic symphysis following the IV administration of 85 mL of Omnipaque 350 and the oral administration of 450 ml of Readi-Cat barium suspension. COMPARISON: 05/26/2022. FINDINGS: There is a right internal jugular Port-A-Cath present with tip located within the right atrium.  There is a left supraclavicular soft tissue density nodule measuring approximately 3.0 x 1.5 cm (image 16, series 2), not significantly changed.  The heart is not enlarged.  There is a moderate amount of atherosclerotic calcification identified within the coronary arteries in a multi-vessel distribution.  Atherosclerotic calcification is also present within the thoracic aorta which is normal in caliber without evidence for aortic dissection or aneurysmal dilatation.  No hilar or mediastinal adenopathy is identified.  No axillary adenopathy is identified.  Once again, there is occlusion of the left upper lobe bronchus with band of soft tissue density within the left upper lobe extending from the hilum much of which represents atelectasis and parenchymal scarring.  There is also a more nodular opacity within the left upper lobe medially measuring 2.1 x 1.5 cm (image 154, series 6) compared to 1.7 x 1.1 cm on the prior examination.  This is  worrisome for enlarging malignancy.  There is also a wedge-shaped opacity within the superior left lower lobe which is unchanged and likely represents an area of atelectasis/scarring.  There is a stable 5 mm pulmonary nodule within the left lower lobe laterally (image 312, series 6).  No new nodules are identified.  There are moderate centrilobular emphysematous changes.  Bony structures appear intact.  There is no evidence for acute fracture or bone destruction. The liver is normal in size and is homogeneous in density with no focal liver lesions identified.  The gallbladder is contracted.  There is no evidence for intrahepatic or extrahepatic biliary dilatation.  The portal venous system is patent.  The spleen and stomach appear unremarkable.  There are a few pancreatic calcifications present which may be related to chronic pancreatitis.  No focal pancreatic lesions are identified.  The adrenal glands are not enlarged.  The kidneys are normal in size and there is no evidence for abnormal renal masses or hydronephrosis.  The kidneys are noted to concentrate contrast symmetrically.  Atherosclerotic calcification is present within the abdominal aorta which demonstrates ectasia without aneurysmal dilatation.  No para-aortic lymphadenopathy is identified.  The appendix is present and appears unremarkable.  No dilated loops of bowel are evident.  The urinary bladder appears grossly unremarkable.  There are multiple prostatic calcifications present. There is no evidence for pelvic or inguinal lymphadenopathy.     Enlarging nodule within the left upper lobe medially now measuring 2.1 x 1.5 cm compared to 1.7 x 1.1 cm on prior examination dated 05/26/2022.  This is worrisome for malignancy. Left supraclavicular soft tissue density nodule measuring 3.0 x 1.5 cm, not significantly changed..  This is an area of prior biopsy proven malignancy. Occlusion of the left upper lobe bronchus with probable atelectasis/scarring within  "the left upper lobe extending from the hilum, not significantly changed.  There is also a wedge-shaped opacity within the superior left lower lobe which is also unchanged and likely represents an area of atelectasis/scarring. 5mm pulmonary nodule within the left lower lobe laterally, stable dating back to 11/06/2020. Moderate centrilobular emphysema. Electronically signed by: Rayshawn Degroot MD Date:    08/25/2022 Time:    13:21    Assessment/Plan:           Lung cancer, main bronchus, left - Primary (Chronic)  8/30/2019 underwent bronchoscopy that showed "A partially obstructing (about 80% obstructed) mass was found in the middle portion in the left mainstem bronchus. The mass was large and bloody, circumferential, endobronchial, friable and necrotic. 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  No actionable mutations and PD-L1 5%.  9/19/21 staging PET CT showed "Hypermetabolic left lung mass concerning for primary pulmonary malignancy with metastatic disease involving the right 6th and 9th ribs as well as mediastinal and left supraclavicular lymph nodes."  Stage IV squamous cell carcinoma of the lung at diagnosis.     10/8/2019-10/21/2019 he received palliative chemoradiation for rib pain with carboplatin and paclitaxel.  He received 3 cycles of carboplatin and paclitaxel.  He developed anaphylactic reaction to paclitaxel.  The chest was treated with AP:PA fields and the right 9th rib was treated with anterior and posterior oblique fields at 300 cGy per fraction to 3000 cGy.   Lt chest 6X 300 10 / 10 3,000   Rt 9th rib 6X 300 10 / 10 3,000      10/31/2019-9/10/2020 he received pembrolizumab (completed 15 cycles, last dose 8/21/2020)  9/10/2020 PET CT showed progression of disease.  He was then referred to Dr. Key for evaluation for clinical trials.  10/20/2020 he underwent repeat biopsy for LUNG MAP trial and was randomized to " Ramucirumab + pembrolizumab.     11/17/2020 started ramucirumab+pembrolizumab.  Completed 4 cycles and then 2/10/2021 scans showed progression.     2/24/2021 he was subsequent started on gemcitabine with Dr. Cruz.    -Referred to rad/onc to have tx of OPAL lesion        A/P 10/14/22    8/25/22 CT scan reviewed and compared to scans going back to 1/2022. Very slow interval growth of left upper lobe nodular opacity. Otherwise he has grossly stable disease.  He also has had treatment interruptions due to recurrent heart failure exacerbation.  -Repeat CT in late Nov to be ordered  -Resume chemo on 10/20/22 with MD and labs the day before          Chronic combined systolic and diastolic heart failure (Chronic)            Problem List Items Addressed This Visit    None      No orders of the defined types were placed in this encounter.            Guerline Higgins MD  Hematology Oncology

## 2022-10-25 NOTE — PROGRESS NOTES
"  PATIENT: Kashif Iverson  MRN: [id number]  DATE: 10/25/2022      Diagnosis:   1. Lung cancer, main bronchus, left    2. Encounter for antineoplastic chemotherapy    3. PAD (peripheral artery disease)    4. Immunodeficiency due to drugs    5. Follow-up exam    6. Benign essential HTN    7. Chronic left shoulder pain            Chief Complaint: Chemotherapy, Follow-up, and Lung Cancer      Oncologic History:    Oncologic History 1. 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 Palliative chemoradiation with carboplatin and paclitaxel; anaphylactic reaction to paclitaxel  2. Pembrolizumab  3. Pembrolizumab+ramucirumab (LungMAP trial)  4. Gemcitabine    Pathology Squamous cell carcinoma, no actionable mutations, PD-L1 5%        Subjective:    Interval History: Mr. Iverson returns for follow up.     8/30/2019 underwent bronchoscopy that showed "A partially obstructing (about 80% obstructed) mass was found in the middle portion in the left mainstem bronchus. The mass was large and bloody, circumferential, endobronchial, friable and necrotic. 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  No actionable mutations and PD-L1 5%.  9/19/21 staging PET CT showed "Hypermetabolic left lung mass concerning for primary pulmonary malignancy with metastatic disease involving the right 6th and 9th ribs as well as mediastinal and left supraclavicular lymph nodes."  Stage IV squamous cell carcinoma of the lung at diagnosis.    10/8/2019-10/21/2019 he received palliative chemoradiation for rib pain with carboplatin and paclitaxel.  He received 3 cycles of carboplatin and paclitaxel.  He developed anaphylactic reaction to paclitaxel.  The chest was treated with AP:PA fields and the right 9th rib was treated with anterior and posterior oblique fields at 300 cGy per fraction to 3000 cGy.   Lt chest 6X 300 10 / 10 3,000   Rt 9th rib 6X 300 10 " / 10 3,000     10/31/2019-9/10/2020 he received pembrolizumab (completed 15 cycles, last dose 8/21/2020)  9/10/2020 PET CT showed progression of disease.  He was then referred to Dr. Key for evaluation for clinical trials.  10/20/2020 he underwent repeat biopsy for LUNG MAP trial and was randomized to Ramucirumab + pembrolizumab.    11/17/2020 started ramucirumab+pembrolizumab.  Completed 4 cycles and then 2/10/2021 scans showed progression.    2/24/2021 he was subsequent started on gemcitabine with Dr. Cruz.  His care was transferred to Dr. Romo who continued his current regimen and now is currently under my care.   5/17/22-5/22/22 admitted for acute respiratory failure secondary to heart failure exacerbation requiring intubation.  He was extubated and diuresed successfully.  8/15/22-8/20/22 admitted for acute hypoxic respiratory failure secondary to heart failure; diuresed and discharged with supplemental oxygen        Interval hx:      Pt feels well and denied any issues at time of visit post hospitalization.  BP was high but pt asymptomatic and just took them before arriving to clinic.  Pt is maintaining a steady fluid balance with improved breathing and denied any new issues apart from tiny amount of blood in stool and sputum this week but no other episodes.  As pt on plavix bleeding precautions given.        Wife accompanies him at this visit.    Past Medical History:   Past Medical History:   Diagnosis Date    Combined systolic and diastolic heart failure     Hypertension     Lung cancer     NSCLC    Lung mass     left lung       Past Surgical HIstory:   Past Surgical History:   Procedure Laterality Date    BRONCHOSCOPY N/A 8/30/2019    Procedure: Bronchoscopy;  [facility];  Location: [facility] OR [address] [place] [address];  Service: Anesthesiology;  Laterality: N/A;    CORONARY ANGIOGRAPHY N/A 3/4/2022    Procedure: ANGIOGRAM, CORONARY ARTERY;  Surgeon: Robson Green MD;  Location: [facility] CATH LAB;   Service: Cardiology;  Laterality: N/A;       Family History:   Family History   Problem Relation Age of Onset    Diabetes Mother     Heart defect Mother     Cancer Father     Cancer Sister     No Known Problems Son        Social History:  reports that he quit smoking about 2 years ago. His smoking use included cigarettes. He has a 25.00 pack-year smoking history. He has never used smokeless tobacco. He reports current alcohol use. He reports that he does not use drugs.    Allergies:  Review of patient's allergies indicates:  No Known Allergies    Medications:  Current Outpatient Medications   Medication Sig Dispense Refill    albuterol (VENTOLIN HFA) 90 mcg/actuation inhaler Inhale 2 puffs into the lungs every 6 (six) hours as needed for Wheezing. Rescue 18 g 11    amLODIPine (NORVASC) 10 MG tablet Take 1 tablet (10 mg total) by mouth once daily. 90 tablet 3    apixaban (ELIQUIS) 5 mg Tab Take 1 tablet (5 mg total) by mouth 2 (two) times daily. 60 tablet 1    ascorbic acid-multivit-min (VITAMIN C ENERGY BOOSTER) 1,000 mg PwEP Take 3,000 mg by mouth once daily. Patient takes 3,000 mg per day      aspirin 81 MG Chew Take 1 tablet (81 mg total) by mouth once daily. 30 tablet 11    atorvastatin (LIPITOR) 80 MG tablet Take 1 tablet (80 mg total) by mouth once daily. 90 tablet 3    cilostazoL (PLETAL) 100 MG Tab Take 1 tablet (100 mg total) by mouth 2 (two) times daily. 60 tablet 11    clopidogreL (PLAVIX) 75 mg tablet Take 1 tablet (75 mg total) by mouth once daily. 30 tablet 11    fluticasone-umeclidin-vilanter (TRELEGY ELLIPTA) 100-62.5-25 mcg DsDv Inhale 1 puff into the lungs once daily. 1 each 1    metoprolol succinate (TOPROL-XL) 50 MG 24 hr tablet Take 1 tablet (50 mg total) by mouth 2 (two) times daily. 60 tablet 1    furosemide (LASIX) 40 MG tablet Take 1 tablet (40 mg total) by mouth 2 (two) times a day. 60 tablet 2     No current facility-administered medications for this visit.       Review of Systems  "  Constitutional:  Negative for activity change, appetite change, chills, diaphoresis, fatigue, fever and unexpected weight change.   HENT:  Negative for nosebleeds and trouble swallowing.    Eyes:  Negative for visual disturbance.   Respiratory:  Negative for cough, chest tightness, shortness of breath and wheezing.    Cardiovascular:  Negative for chest pain and leg swelling.   Gastrointestinal:  Negative for abdominal distention, abdominal pain, blood in stool, constipation, diarrhea, nausea and vomiting.   Endocrine: Negative for cold intolerance and heat intolerance.   Genitourinary:  Negative for difficulty urinating and dysuria.   Musculoskeletal:  Negative for arthralgias, back pain and myalgias.   Skin:  Negative for color change.   Neurological:  Negative for dizziness, weakness, light-headedness, numbness and headaches.   Hematological:  Negative for adenopathy. Bruises/bleeds easily.   Psychiatric/Behavioral:  Negative for confusion.      ECOG Performance Status:     ECOG SCORE    1 - Restricted in strenuous activity-ambulatory and able to carry out work of a light nature         Objective:      Vitals:   Vitals:    10/25/22 0938 10/25/22 1008   BP: (!) 193/95 (!) 180/60   BP Location: Right arm Right arm   Patient Position: Sitting Sitting   BP Method: Large (Automatic) Large (Manual)   Pulse: 83    Temp: 97.7 °F (36.5 °C)    TempSrc: Oral    SpO2: (!) 94%    Weight: 89.2 kg (196 lb 10.4 oz)    Height: 5' 10" (1.778 m)          BMI: Body mass index is 28.22 kg/m².    Physical Exam  Constitutional:       General: He is not in acute distress.     Appearance: Normal appearance. He is not ill-appearing.   HENT:      Head: Normocephalic and atraumatic.      Nose: Nose normal.      Mouth/Throat:      Pharynx: No oropharyngeal exudate or posterior oropharyngeal erythema.   Eyes:      General: No scleral icterus.     Extraocular Movements: Extraocular movements intact.      Conjunctiva/sclera: Conjunctivae " normal.      Pupils: Pupils are equal, round, and reactive to light.   Cardiovascular:      Rate and Rhythm: Normal rate and regular rhythm.      Heart sounds: No murmur heard.    No friction rub. No gallop.      Comments: Right chest wall port c/d/i  Pulmonary:      Effort: Pulmonary effort is normal. No respiratory distress.      Breath sounds: No stridor. No wheezing, rhonchi or rales.   Abdominal:      General: Bowel sounds are normal. There is no distension.      Palpations: Abdomen is soft. There is no mass.      Tenderness: There is no abdominal tenderness. There is no guarding or rebound.   Musculoskeletal:         General: No swelling or tenderness. Normal range of motion.      Cervical back: Normal range of motion and neck supple.      Right lower leg: No edema.      Left lower leg: No edema.   Skin:     General: Skin is warm and dry.      Findings: No bruising.   Neurological:      General: No focal deficit present.      Mental Status: He is alert.       Laboratory Data:   Recent Results (from the past 168 hour(s))   COMPREHENSIVE METABOLIC PANEL    Collection Time: 10/25/22  9:25 AM   Result Value Ref Range    Sodium 140 136 - 145 mmol/L    Potassium 4.0 3.5 - 5.1 mmol/L    Chloride 105 95 - 110 mmol/L    CO2 22 (L) 23 - 29 mmol/L    Glucose 118 (H) 70 - 110 mg/dL    BUN 10 8 - 23 mg/dL    Creatinine 0.8 0.5 - 1.4 mg/dL    Calcium 9.0 8.7 - 10.5 mg/dL    Total Protein 7.6 6.0 - 8.4 g/dL    Albumin 3.8 3.5 - 5.2 g/dL    Total Bilirubin 0.8 0.1 - 1.0 mg/dL    Alkaline Phosphatase 106 55 - 135 U/L    AST 22 10 - 40 U/L    ALT 13 10 - 44 U/L    Anion Gap 13 8 - 16 mmol/L    eGFR >60 >60 mL/min/1.73 m^2   CBC w/ DIFF    Collection Time: 10/25/22  9:25 AM   Result Value Ref Range    WBC 7.05 3.90 - 12.70 K/uL    RBC 4.46 (L) 4.60 - 6.20 M/uL    Hemoglobin 13.8 (L) 14.0 - 18.0 g/dL    Hematocrit 41.2 40.0 - 54.0 %    MCV 92 82 - 98 fL    MCH 30.9 27.0 - 31.0 pg    MCHC 33.5 32.0 - 36.0 g/dL    RDW 14.9 (H) 11.5  - 14.5 %    Platelets 224 150 - 450 K/uL    MPV 10.4 9.2 - 12.9 fL    Immature Granulocytes 0.4 0.0 - 0.5 %    Gran # (ANC) 4.3 1.8 - 7.7 K/uL    Immature Grans (Abs) 0.03 0.00 - 0.04 K/uL    Lymph # 1.4 1.0 - 4.8 K/uL    Mono # 1.1 (H) 0.3 - 1.0 K/uL    Eos # 0.2 0.0 - 0.5 K/uL    Baso # 0.08 0.00 - 0.20 K/uL    nRBC 0 0 /100 WBC    Gran % 60.5 38.0 - 73.0 %    Lymph % 20.3 18.0 - 48.0 %    Mono % 15.6 (H) 4.0 - 15.0 %    Eosinophil % 2.1 0.0 - 8.0 %    Basophil % 1.1 0.0 - 1.9 %    Differential Method Automated    Magnesium    Collection Time: 10/25/22  9:25 AM   Result Value Ref Range    Magnesium 1.6 1.6 - 2.6 mg/dL             Imaging:       CT Chest Abdomen Pelvis With Contrast (xpd)    Result Date: 8/25/2022  EXAMINATION: CT CHEST ABDOMEN PELVIS WITH CONTRAST (XPD) CLINICAL HISTORY: metastatic lung cancer on gemcitabine, eval response; Malignant neoplasm of left main bronchus TECHNIQUE: Low dose axial images, sagittal and coronal reformations were obtained from the thoracic inlet to the pubic symphysis following the IV administration of 85 mL of Omnipaque 350 and the oral administration of 450 ml of Readi-Cat barium suspension. COMPARISON: 05/26/2022. FINDINGS: There is a right internal jugular Port-A-Cath present with tip located within the right atrium.  There is a left supraclavicular soft tissue density nodule measuring approximately 3.0 x 1.5 cm (image 16, series 2), not significantly changed.  The heart is not enlarged.  There is a moderate amount of atherosclerotic calcification identified within the coronary arteries in a multi-vessel distribution.  Atherosclerotic calcification is also present within the thoracic aorta which is normal in caliber without evidence for aortic dissection or aneurysmal dilatation.  No hilar or mediastinal adenopathy is identified.  No axillary adenopathy is identified.  Once again, there is occlusion of the left upper lobe bronchus with band of soft tissue density within  the left upper lobe extending from the hilum much of which represents atelectasis and parenchymal scarring.  There is also a more nodular opacity within the left upper lobe medially measuring 2.1 x 1.5 cm (image 154, series 6) compared to 1.7 x 1.1 cm on the prior examination.  This is worrisome for enlarging malignancy.  There is also a wedge-shaped opacity within the superior left lower lobe which is unchanged and likely represents an area of atelectasis/scarring.  There is a stable 5 mm pulmonary nodule within the left lower lobe laterally (image 312, series 6).  No new nodules are identified.  There are moderate centrilobular emphysematous changes.  Bony structures appear intact.  There is no evidence for acute fracture or bone destruction. The liver is normal in size and is homogeneous in density with no focal liver lesions identified.  The gallbladder is contracted.  There is no evidence for intrahepatic or extrahepatic biliary dilatation.  The portal venous system is patent.  The spleen and stomach appear unremarkable.  There are a few pancreatic calcifications present which may be related to chronic pancreatitis.  No focal pancreatic lesions are identified.  The adrenal glands are not enlarged.  The kidneys are normal in size and there is no evidence for abnormal renal masses or hydronephrosis.  The kidneys are noted to concentrate contrast symmetrically.  Atherosclerotic calcification is present within the abdominal aorta which demonstrates ectasia without aneurysmal dilatation.  No para-aortic lymphadenopathy is identified.  The appendix is present and appears unremarkable.  No dilated loops of bowel are evident.  The urinary bladder appears grossly unremarkable.  There are multiple prostatic calcifications present. There is no evidence for pelvic or inguinal lymphadenopathy.     Enlarging nodule within the left upper lobe medially now measuring 2.1 x 1.5 cm compared to 1.7 x 1.1 cm on prior examination  "dated 05/26/2022.  This is worrisome for malignancy. Left supraclavicular soft tissue density nodule measuring 3.0 x 1.5 cm, not significantly changed..  This is an area of prior biopsy proven malignancy. Occlusion of the left upper lobe bronchus with probable atelectasis/scarring within the left upper lobe extending from the hilum, not significantly changed.  There is also a wedge-shaped opacity within the superior left lower lobe which is also unchanged and likely represents an area of atelectasis/scarring. 5mm pulmonary nodule within the left lower lobe laterally, stable dating back to 11/06/2020. Moderate centrilobular emphysema. Electronically signed by: Rayshawn Degroot MD Date:    08/25/2022 Time:    13:21    Assessment/Plan:           Lung cancer, main bronchus, left - Primary (Chronic)  8/30/2019 underwent bronchoscopy that showed "A partially obstructing (about 80% obstructed) mass was found in the middle portion in the left mainstem bronchus. The mass was large and bloody, circumferential, endobronchial, friable and necrotic. 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  No actionable mutations and PD-L1 5%.  9/19/21 staging PET CT showed "Hypermetabolic left lung mass concerning for primary pulmonary malignancy with metastatic disease involving the right 6th and 9th ribs as well as mediastinal and left supraclavicular lymph nodes."  Stage IV squamous cell carcinoma of the lung at diagnosis.     10/8/2019-10/21/2019 he received palliative chemoradiation for rib pain with carboplatin and paclitaxel.  He received 3 cycles of carboplatin and paclitaxel.  He developed anaphylactic reaction to paclitaxel.  The chest was treated with AP:PA fields and the right 9th rib was treated with anterior and posterior oblique fields at 300 cGy per fraction to 3000 cGy.   Lt chest 6X 300 10 / 10 3,000   Rt 9th rib 6X 300 10 / 10 3,000    " "  10/31/2019-9/10/2020 he received pembrolizumab (completed 15 cycles, last dose 8/21/2020)  9/10/2020 PET CT showed progression of disease.  He was then referred to Dr. Key for evaluation for clinical trials.  10/20/2020 he underwent repeat biopsy for LUNG MAP trial and was randomized to Ramucirumab + pembrolizumab.     11/17/2020 started ramucirumab+pembrolizumab.  Completed 4 cycles and then 2/10/2021 scans showed progression.     2/24/2021 he was subsequent started on gemcitabine with Dr. Cruz.    -Treated in 9/22 with XRT for OPAL lesion        A/P 10/25/22    8/25/22 CT scan reviewed and compared to scans going back to 1/2022. Very slow interval growth of left upper lobe nodular opacity. Otherwise he has grossly stable disease.  He also has had treatment interruptions due to recurrent heart failure exacerbation.  -Repeat CT in late Nov to be ordered  -Resume chemo, labs reviewed and RTC in 2 weeks for MD and tx          Chronic combined systolic and diastolic heart failure (Chronic)  -Fluid balance improving       Left shoulder pain  -chronic  -As not improving will order Xray      Problem List Items Addressed This Visit          Cardiac/Vascular    PAD (peripheral artery disease)    Overview     LUIS FELIPE 3/11/22            Immunology/Multi System    Immunodeficiency due to drugs (Chronic)       Oncology    Lung cancer, main bronchus, left - Primary (Chronic)    Overview     8/30/2019 underwent bronchoscopy that showed "A partially obstructing (about 80% obstructed) mass was found in the middle portion in the left mainstem bronchus. The mass was large and bloody, circumferential, endobronchial, friable and necrotic. 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  No actionable mutations and PD-L1 5%.  9/19/21 staging PET CT showed "Hypermetabolic left lung mass concerning for primary pulmonary malignancy with metastatic disease involving " "the right 6th and 9th ribs as well as mediastinal and left supraclavicular lymph nodes."  Stage IV squamous cell carcinoma of the lung at diagnosis.    10/8/2019-10/21/2019 he received palliative chemoradiation for rib pain with carboplatin and paclitaxel.  He received 3 cycles of carboplatin and paclitaxel.  He developed anaphylactic reaction to paclitaxel.  The chest was treated with AP:PA fields and the right 9th rib was treated with anterior and posterior oblique fields at 300 cGy per fraction to 3000 cGy.   Lt chest 6X 300 10 / 10 3,000   Rt 9th rib 6X 300 10 / 10 3,000     10/31/2019-9/10/2020 he received pembrolizumab (completed 15 cycles, last dose 8/21/2020)  9/10/2020 PET CT showed progression of disease.  He was then referred to Dr. Key for evaluation for clinical trials.  10/20/2020 he underwent repeat biopsy for LUNG MAP trial and was randomized to Ramucirumab + pembrolizumab.    11/17/2020 started ramucirumab+pembrolizumab.  Completed 4 cycles and then 2/10/2021 scans showed progression.    2/24/2021 he was subsequent started on gemcitabine with Dr. Cruz.          Other Visit Diagnoses       Encounter for antineoplastic chemotherapy        Follow-up exam        Benign essential HTN        Chronic left shoulder pain        Relevant Orders    X-Ray Shoulder Trauma 3 view Left          Orders Placed This Encounter   Procedures    X-Ray Shoulder Trauma 3 view Left               Guerline Higgins MD  Hematology Oncology                      "

## 2022-10-25 NOTE — Clinical Note
-BP elevated but pt asymptomatic and took meds not even an hour before arriving -Please recheck Bp manually -If BP is lowering and labs wnl will sign chemo today -Xray ordered for left shoulder pain -RTC in 2 weeks for MD, labs (CMP, CBC, Mg) and gem

## 2022-10-25 NOTE — PLAN OF CARE
Patient arrived to unit for D15 Gemzar infusion. Labs reviewed, pt cleared for chemo per Dr. Higgins. Pt has 2L O2 per N/c. Denies SOB. Pt reports blood in stool, Dr. Higgins aware. Plan of care reviewed, patient agreeable to plan.Zofran administered prior to Gemzar. Patient tolerated infusion well.VSS. AVS printed and given to pt and wife. Discharge instructions reviewed, patient instructed to return 11/7 for labs and 11/8 for tx. Patient left off unit in w/c escorted by spouse. Patient in NAD at time of discharge.

## 2022-11-03 PROBLEM — J44.1 CHRONIC OBSTRUCTIVE PULMONARY DISEASE WITH ACUTE EXACERBATION: Status: ACTIVE | Noted: 2022-01-01

## 2022-11-03 PROBLEM — I50.32 CHRONIC DIASTOLIC HEART FAILURE: Status: ACTIVE | Noted: 2022-02-23

## 2022-11-03 NOTE — TELEPHONE ENCOUNTER
Patient spouse called to inform dr Higgins that patient oxygen is very low, even after being connected to his oxygen machine. spO2 went to 98 then dropped right back to 78 while connected. Informed patient wife that dr Higgins is not in clinic today and advised her to bring him to the emergency department. She understood and agreed.

## 2022-11-03 NOTE — ED PROVIDER NOTES
Encounter Date: 11/3/2022       History     Chief Complaint   Patient presents with    Shortness of Breath     Pt c/o SOB since this morning. Pt stated his SPO2 has been in the 70s on 2 LPM of O2. Pt 88% on 3 LPM at this time.     61-year-old male with a history of heart failure, lung cancer, lung mass presenting with hypoxia, shortness of breath.  Patient's wife reports he was at home asleep and she noted he was breathing fast.  She reports she used a home pox oximeter which showed a reading of 75%.  She notes he has had a cough, worsening dyspnea with exertion, and difficulty breathing today compared to most days.  She reports he is normally on 2-3 L of oxygen today which he continue to take.  Patient denies fevers, chills, chest pain, hemoptysis.  Notes left lower extremity edema which she states is chronic.  Reports he was recently admitted for a blood infection.  Denies fevers, chills.  Notes recent dipped disability with multiple chronic medical problems.    Review of patient's allergies indicates:  No Known Allergies  Past Medical History:   Diagnosis Date    Combined systolic and diastolic heart failure     Hypertension     Lung cancer     NSCLC    Lung mass     left lung    Peripheral artery disease      Past Surgical History:   Procedure Laterality Date    BRONCHOSCOPY N/A 08/30/2019    Procedure: Bronchoscopy;  Surgeon: Miguel Diagnostic Provider;  Location: [facility] OR [address] [place] [address];  Service: Anesthesiology;  Laterality: N/A;    CORONARY ANGIOGRAPHY N/A 03/04/2022    Procedure: ANGIOGRAM, CORONARY ARTERY;  Surgeon: Robson Green MD;  Location: [facility] CATH LAB;  Service: Cardiology;  Laterality: N/A;    INSERTION OF TUNNELED CENTRAL VENOUS CATHETER (CVC) WITH SUBCUTANEOUS PORT       Family History   Problem Relation Age of Onset    Diabetes Mother     Heart defect Mother     Cancer Father     Cancer Sister     No Known Problems Son      Social History     Tobacco Use    Smoking status: Former     Packs/day: 1.00      Years: 25.00     Pack years: 25.00     Types: Cigarettes     Quit date:      Years since quittin.8    Smokeless tobacco: Never   Substance Use Topics    Alcohol use: Yes     Comment: 11/3/22: Occ.    Drug use: Never     Review of Systems   Constitutional:  Negative for chills and fever.   HENT:  Negative for sore throat.    Eyes:  Negative for photophobia.   Respiratory:  Positive for cough and shortness of breath. Negative for chest tightness and wheezing.    Cardiovascular:  Negative for chest pain.   Gastrointestinal:  Negative for abdominal pain, nausea and vomiting.   Genitourinary:  Negative for dysuria.   Musculoskeletal:  Negative for back pain.   Skin:  Negative for rash.   Neurological:  Negative for weakness.   Psychiatric/Behavioral:  Negative for confusion.      Physical Exam     Initial Vitals   BP Pulse Resp Temp SpO2   22 1345 22 1345 22 1345 22 1701 22 1345   (!) 161/95 107 (!) 25 98.5 °F (36.9 °C) (!) 89 %      MAP       --                Physical Exam    Nursing note and vitals reviewed.  Constitutional: He appears well-developed and well-nourished. He is not diaphoretic. No distress.   HENT:   Head: Normocephalic and atraumatic.   Eyes: Conjunctivae and EOM are normal. Pupils are equal, round, and reactive to light. No scleral icterus.   Neck: Neck supple.   Normal range of motion.  Cardiovascular:  Normal rate, regular rhythm, normal heart sounds and intact distal pulses.     Exam reveals no gallop and no friction rub.       No murmur heard.  Pulmonary/Chest: Breath sounds normal. No stridor. No respiratory distress. He has no wheezes. He has no rhonchi. He has no rales.   Abdominal: Abdomen is soft. Bowel sounds are normal. He exhibits no distension. There is no abdominal tenderness. There is no rebound and no guarding.   Musculoskeletal:         General: No tenderness or edema. Normal range of motion.      Cervical back: Normal range of motion and neck  supple.     Neurological: He is alert and oriented to person, place, and time. He has normal strength. No cranial nerve deficit. GCS score is 15. GCS eye subscore is 4. GCS verbal subscore is 5. GCS motor subscore is 6.   Skin: Skin is warm and dry. No rash noted.   Psychiatric: He has a normal mood and affect. His behavior is normal.       ED Course   Procedures  Labs Reviewed   CBC W/ AUTO DIFFERENTIAL - Abnormal; Notable for the following components:       Result Value    RBC 4.18 (*)     Hemoglobin 12.7 (*)     Hematocrit 37.0 (*)     Platelets 135 (*)     Gran # (ANC) 9.0 (*)     Immature Grans (Abs) 0.06 (*)     Mono # 1.2 (*)     Gran % 77.9 (*)     Lymph % 10.1 (*)     All other components within normal limits   COMPREHENSIVE METABOLIC PANEL - Abnormal; Notable for the following components:    Potassium 2.9 (*)     Glucose 155 (*)     Albumin 3.2 (*)     All other components within normal limits   LACTIC ACID, PLASMA - Abnormal; Notable for the following components:    Lactate (Lactic Acid) 2.7 (*)     All other components within normal limits   B-TYPE NATRIURETIC PEPTIDE - Abnormal; Notable for the following components:     (*)     All other components within normal limits   CULTURE, BLOOD   CULTURE, BLOOD   TROPONIN I          Imaging Results              CTA Chest Non-Coronary (PE Studies) (Final result)  Result time 11/03/22 19:51:16      Final result by Abelino Finley MD (11/03/22 19:51:16)                   Impression:      1. No evidence of PE.  2. Persistent abnormal appearance of the chest and lungs as detailed above, noting chronic occlusion of the left mainstem bronchus, chronic consolidative changes in the left upper and lower lobes and right lower lobe, as well as soft tissue mass versus enlarged lymph node in the left supraclavicular/retroclavicular region.  Continued outpatient follow-up is recommended.  3. Small to moderate volume right pleural effusion.      Electronically signed  by: Abelino Finley MD  Date:    11/03/2022  Time:    19:51               Narrative:    EXAMINATION:  CTA CHEST NON CORONARY (PE STUDIES)    CLINICAL HISTORY:  Pulmonary embolism (PE) suspected, positive D-dimer;    TECHNIQUE:  Low dose axial images, sagittal and coronal reformations were obtained from the thoracic inlet to the lung bases following the IV administration of 100 mL of Omnipaque 350.  Contrast timing was optimized to evaluate the pulmonary arteries.  MIP images were performed.    COMPARISON:  CTA chest 09/28/2022.    FINDINGS:  No evidence of pulmonary thromboembolism.  No evidence of aortic aneurysm or dissection.  Heart is in size with no pericardial effusion.  Small to moderate volume right pleural effusion is seen.  Grossly stable abnormal appearance of the lungs with stable regions of consolidation and atelectasis or seen within the left upper and lower lobes and right lower lobe.  There is unchanged occlusion seen of the left upper lobe bronchus.  Potential underlying lesion not excluded at this level.  Findings appear overall unchanged compared to previous CT.  There is left-sided pleural thickening.  Major airways are otherwise patent.  Interlobular septal thickening is seen within the lower lobes, left greater than right.  Redemonstration of known mass or enlarged node seen in the left supraclavicular/retroclavicular region.  No significant abnormalities are seen along the esophageal course. The visualized abdominal structures show no acute abnormalities.  Osseous structures demonstrate degenerative change.                                       X-Ray Chest 1 View (Final result)  Result time 11/03/22 17:15:47      Final result by Abelino Finley MD (11/03/22 17:15:47)                   Impression:      Stable abnormal appearance of the chest.  No significant change.      Electronically signed by: Abelino Finley MD  Date:    11/03/2022  Time:    17:15               Narrative:     EXAMINATION:  XR CHEST 1 VIEW    CLINICAL HISTORY:  Shortness of breath    TECHNIQUE:  Single frontal view of the chest was performed.    COMPARISON:  Chest radiograph and CTA chest from 09/28/2022.    FINDINGS:  Cardiac silhouette is stable in size.  Asymmetric decreased volume is seen of the left lung as compared to the right with stable areas of persistent/chronic consolidation in the left upper and lower lobes.  There are small bilateral pleural effusions.  Unchanged opacification/consolidation is also seen in the right lower lung zone.  There is pulmonary emphysema.  Right-sided chest port is in stable position.  No pneumothorax.                                       Medications   aspirin chewable tablet 81 mg (has no administration in time range)   atorvastatin tablet 80 mg (has no administration in time range)   cilostazoL tablet 100 mg (has no administration in time range)   clopidogreL tablet 75 mg (has no administration in time range)   furosemide tablet 40 mg (has no administration in time range)   sodium chloride 0.9% flush 10 mL (has no administration in time range)   albuterol-ipratropium 2.5 mg-0.5 mg/3 mL nebulizer solution 3 mL (has no administration in time range)   melatonin tablet 6 mg (has no administration in time range)   ondansetron injection 4 mg (has no administration in time range)   prochlorperazine injection Soln 5 mg (has no administration in time range)   polyethylene glycol packet 17 g (has no administration in time range)   bisacodyL suppository 10 mg (has no administration in time range)   acetaminophen tablet 650 mg (has no administration in time range)   simethicone chewable tablet 80 mg (has no administration in time range)   aluminum-magnesium hydroxide-simethicone 200-200-20 mg/5 mL suspension 30 mL (has no administration in time range)   acetaminophen tablet 650 mg (has no administration in time range)   HYDROcodone-acetaminophen 5-325 mg per tablet 1 tablet (has no  administration in time range)   naloxone 0.4 mg/mL injection 0.02 mg (has no administration in time range)   potassium bicarbonate disintegrating tablet 50 mEq (has no administration in time range)   potassium bicarbonate disintegrating tablet 35 mEq (has no administration in time range)   potassium bicarbonate disintegrating tablet 60 mEq (has no administration in time range)   magnesium oxide tablet 800 mg (has no administration in time range)   magnesium oxide tablet 800 mg (has no administration in time range)   potassium, sodium phosphates 280-160-250 mg packet 2 packet (has no administration in time range)   potassium, sodium phosphates 280-160-250 mg packet 2 packet (has no administration in time range)   potassium, sodium phosphates 280-160-250 mg packet 2 packet (has no administration in time range)   glucose chewable tablet 16 g (has no administration in time range)   glucose chewable tablet 24 g (has no administration in time range)   glucagon (human recombinant) injection 1 mg (has no administration in time range)   methylPREDNISolone sodium succinate injection 80 mg (has no administration in time range)   predniSONE tablet 50 mg (has no administration in time range)   albuterol-ipratropium 2.5 mg-0.5 mg/3 mL nebulizer solution 3 mL (3 mLs Nebulization Given 11/3/22 2042)   dextrose 10% bolus 125 mL (has no administration in time range)   dextrose 10% bolus 250 mL (has no administration in time range)   metoprolol succinate (TOPROL-XL) 24 hr tablet 50 mg (has no administration in time range)   apixaban tablet 5 mg (has no administration in time range)   potassium chloride SA CR tablet 60 mEq (has no administration in time range)   amLODIPine tablet 10 mg (has no administration in time range)   iohexoL (OMNIPAQUE 350) injection 100 mL (100 mLs Intravenous Given 11/3/22 1901)     Medical Decision Making:   Initial Assessment:   61-year-old male presenting with shortness of breath.  On exam patient is  mildly tachypneic at rest.  Speaking in full sentences.  95% on my exam, previously 89%.  Appears to get short of breath with minimal exertion.  Lungs with mild inspiratory crackles.  Differential is broad includes CHF, COPD, PE, pneumonia.  Will labs, x-ray, reassess.  ED Management:  X-ray and CT scan relatively similar.  Does show areas of consolidation consistent with known advanced lung cancer.  Patient notably hypoxic, 90% despite being on 5 L nasal cannula.  Concern for pneumonia.  Patient will be admitted to Hospital Medicine for further evaluation and treatment.                        Clinical Impression:   Final diagnoses:  [R06.02] SOB (shortness of breath)  [R06.02] Shortness of breath        ED Disposition Condition    Admit Stable                Talib Weber MD  11/03/22 [id number]

## 2022-11-03 NOTE — ED TRIAGE NOTES
Pt c/o SOB since this morning. Pt stated his SPO2 has been in the 70s on 2 LPM of O2. Pt 90% on 3 LPM at this time.    Pt reporting current stage 4 lung cancer. Diagnosed in 2019- receiving chemotherapy. Pt denies hospice care. Pt oriented and stable. Family at bedside.

## 2022-11-04 NOTE — PLAN OF CARE
Problem: Physical Therapy  Goal: Physical Therapy Goal  Description: Goals to be met by: 22     Patient will increase functional independence with mobility by performin. Supine to sit with Modified Wise  2. Sit to stand transfer with Modified Wise  3. Gait  x 25-50 feet with Modified Wise    4. Lower extremity exercise program x10 reps per handout, with supervision    Outcome: Ongoing, Progressing   Initial PT evaluation performed today.  Pt could benefit from skilled PT services 2-3x/wk I order to maximize function prior to D/C.  HHPT and family support recommended.  DME in place if needed.

## 2022-11-04 NOTE — SUBJECTIVE & OBJECTIVE
Past Medical History:   Diagnosis Date    Combined systolic and diastolic heart failure     Hypertension     Lung cancer     NSCLC    Lung mass     left lung    Peripheral artery disease        Past Surgical History:   Procedure Laterality Date    BRONCHOSCOPY N/A 08/30/2019    Procedure: Bronchoscopy;  Surgeon: Miguel Diagnostic Provider;  Location: [facility] OR [address] [place] [address];  Service: Anesthesiology;  Laterality: N/A;    CORONARY ANGIOGRAPHY N/A 03/04/2022    Procedure: ANGIOGRAM, CORONARY ARTERY;  Surgeon: Robson Green MD;  Location: [facility] CATH LAB;  Service: Cardiology;  Laterality: N/A;    INSERTION OF TUNNELED CENTRAL VENOUS CATHETER (CVC) WITH SUBCUTANEOUS PORT         Review of patient's allergies indicates:  No Known Allergies    No current facility-administered medications on file prior to encounter.     Current Outpatient Medications on File Prior to Encounter   Medication Sig    albuterol (VENTOLIN HFA) 90 mcg/actuation inhaler Inhale 2 puffs into the lungs every 6 (six) hours as needed for Wheezing. Rescue    amLODIPine (NORVASC) 10 MG tablet Take 1 tablet (10 mg total) by mouth once daily.    ascorbic acid-multivit-min (VITAMIN C ENERGY BOOSTER) 1,000 mg PwEP Take 3,000 mg by mouth once daily. Patient takes 3,000 mg per day    aspirin 81 MG Chew Take 1 tablet (81 mg total) by mouth once daily.    atorvastatin (LIPITOR) 80 MG tablet Take 1 tablet (80 mg total) by mouth once daily.    cilostazoL (PLETAL) 100 MG Tab Take 1 tablet (100 mg total) by mouth 2 (two) times daily.    clopidogreL (PLAVIX) 75 mg tablet Take 1 tablet (75 mg total) by mouth once daily.    furosemide (LASIX) 40 MG tablet Take 1 tablet (40 mg total) by mouth 2 (two) times a day.    fluticasone-umeclidin-vilanter (TRELEGY ELLIPTA) 100-62.5-25 mcg DsDv Inhale 1 puff into the lungs once daily.    metoprolol succinate (TOPROL-XL) 50 MG 24 hr tablet Take 1 tablet (50 mg total) by mouth 2 (two) times daily.    [DISCONTINUED] apixaban (ELIQUIS) 5 mg  Tab Take 1 tablet (5 mg total) by mouth 2 (two) times daily.     Family History       Problem Relation (Age of Onset)    Cancer Father, Sister    Diabetes Mother    Heart defect Mother    No Known Problems Son          Tobacco Use    Smoking status: Former     Packs/day: 1.00     Years: 25.00     Pack years: 25.00     Types: Cigarettes     Quit date:      Years since quittin.8    Smokeless tobacco: Never   Substance and Sexual Activity    Alcohol use: Yes     Comment: 11/3/22: Occ.    Drug use: Never    Sexual activity: Yes     Partners: Female     Review of Systems   Constitutional: Negative.    HENT: Negative.     Eyes: Negative.    Respiratory:  Positive for cough and shortness of breath.    Cardiovascular:  Positive for leg swelling.   Gastrointestinal: Negative.    Endocrine: Negative.    Genitourinary: Negative.    Musculoskeletal: Negative.    Skin: Negative.    Allergic/Immunologic: Negative.    Neurological: Negative.    Psychiatric/Behavioral: Negative.     Objective:     Vital Signs (Most Recent):  Temp: 98.5 °F (36.9 °C) (22 1701)  Pulse: (!) 116 (22)  Resp: (!) 34 (22)  BP: 123/82 (22 1833)  SpO2: (!) 88 % (22)   Vital Signs (24h Range):  Temp:  [98.5 °F (36.9 °C)] 98.5 °F (36.9 °C)  Pulse:  [] 116  Resp:  [17-34] 34  SpO2:  [88 %-95 %] 88 %  BP: (122-161)/(78-95) 123/82     Weight: 88.9 kg (196 lb)  Body mass index is 28.12 kg/m².    Physical Exam  Vitals and nursing note reviewed.   Constitutional:       General: He is not in acute distress.     Appearance: Normal appearance. He is not ill-appearing.   HENT:      Head: Normocephalic and atraumatic.      Nose: Nose normal.      Mouth/Throat:      Mouth: Mucous membranes are moist.   Eyes:      Extraocular Movements: Extraocular movements intact.   Cardiovascular:      Rate and Rhythm: Normal rate.      Pulses: Normal pulses.      Heart sounds: No murmur heard.  Pulmonary:      Effort:  Respiratory distress present.      Comments: Diminished air entry bilaterally   On 8L O2  Abdominal:      General: Abdomen is flat.      Palpations: Abdomen is soft.      Tenderness: There is no abdominal tenderness.   Musculoskeletal:      Right lower leg: No edema.      Left lower leg: No edema.   Skin:     General: Skin is warm.      Capillary Refill: Capillary refill takes less than 2 seconds.   Neurological:      General: No focal deficit present.      Mental Status: He is alert.   Psychiatric:         Mood and Affect: Mood normal.           Significant Labs: All pertinent labs within the past 24 hours have been reviewed.    Significant Imaging: I have reviewed all pertinent imaging results/findings within the past 24 hours.

## 2022-11-04 NOTE — SUBJECTIVE & OBJECTIVE
Interval History: feeling a little better.    Review of Systems   HENT:  Negative for ear discharge and ear pain.    Eyes:  Negative for discharge and itching.   Endocrine: Negative for cold intolerance and heat intolerance.   Neurological:  Negative for seizures and syncope.   Objective:     Vital Signs (Most Recent):  Temp: 98 °F (36.7 °C) (11/04/22 0735)  Pulse: 98 (11/04/22 1045)  Resp: 20 (11/04/22 1045)  BP: 134/81 (11/04/22 0735)  SpO2: (!) 93 % (11/04/22 1045)   Vital Signs (24h Range):  Temp:  [97.6 °F (36.4 °C)-98.5 °F (36.9 °C)] 98 °F (36.7 °C)  Pulse:  [] 98  Resp:  [17-34] 20  SpO2:  [83 %-98 %] 93 %  BP: (122-161)/(78-95) 134/81     Weight: 88.2 kg (194 lb 7.1 oz)  Body mass index is 27.9 kg/m².    Intake/Output Summary (Last 24 hours) at 11/4/2022 1046  Last data filed at 11/4/2022 0950  Gross per 24 hour   Intake 390 ml   Output 1250 ml   Net -860 ml      Physical Exam  Vitals and nursing note reviewed.   Constitutional:       General: He is not in acute distress.     Appearance: Normal appearance. He is not ill-appearing.   HENT:      Head: Normocephalic and atraumatic.      Nose: Nose normal.      Mouth/Throat:      Mouth: Mucous membranes are moist.   Eyes:      Extraocular Movements: Extraocular movements intact.   Cardiovascular:      Rate and Rhythm: Normal rate.      Pulses: Normal pulses.      Heart sounds: No murmur heard.  Pulmonary:      Effort: Respiratory distress present.      Comments: Diminished air entry bilaterally   Abdominal:      General: Abdomen is flat.      Palpations: Abdomen is soft.      Tenderness: There is no abdominal tenderness.   Musculoskeletal:      Right lower leg: No edema.      Left lower leg: No edema.   Skin:     General: Skin is warm.      Capillary Refill: Capillary refill takes less than 2 seconds.   Neurological:      General: No focal deficit present.      Mental Status: He is alert.   Psychiatric:         Mood and Affect: Mood normal.        Significant Labs: All pertinent labs within the past 24 hours have been reviewed.  BMP:   Recent Labs   Lab 11/04/22  0457   *      K 3.7      CO2 25   BUN 13   CREATININE 0.8   CALCIUM 9.2   MG 1.6     CBC:   Recent Labs   Lab 11/03/22  1434 11/04/22  0457   WBC 11.54 9.95   HGB 12.7* 14.1   HCT 37.0* 43.1   * 167       Significant Imaging: I have reviewed all pertinent imaging results/findings within the past 24 hours.

## 2022-11-04 NOTE — ASSESSMENT & PLAN NOTE
- History of COPD on supplemental O2, 2L NC at home   - Presented with worsening SOB   - Likely related to a COPD exacerbation      Plan:   - Continue steroids.  - Duo-Nebs scheduled  - Wean O2 as tolerated

## 2022-11-04 NOTE — PT/OT/SLP EVAL
Occupational Therapy   Evaluation    Name: Kashif Iverson  MRN: [id number]  Admitting Diagnosis:  Acute on chronic respiratory failure with hypoxia  Recent Surgery: * No surgery found *      Recommendations:     Discharge Recommendations: home health OT (with family assist)  Discharge Equipment Recommendations:  bath bench  Barriers to discharge:  None    Assessment:     Kashif Iverson is a 61 y.o. male with a medical diagnosis of Acute on chronic respiratory failure with hypoxia.   Performance deficits affecting function: weakness, impaired self care skills, impaired functional mobility, gait instability, impaired balance, decreased upper extremity function, decreased safety awareness, impaired cardiopulmonary response to activity.      Rehab Prognosis: Good and Fair; patient would benefit from acute skilled OT services to address these deficits and reach maximum level of function.       Plan:     Patient to be seen 3 x/week to address the above listed problems via self-care/home management, therapeutic exercises, therapeutic activities  Plan of Care Expires: 11/18/22  Plan of Care Reviewed with: patient    Subjective     Chief Complaint: SOB  Patient/Family Comments/goals: return home    Occupational Profile:  Living Environment: lives with his spouse in a [facility] with no CHENG  Previous level of function: Mod I amb using a cane or RW, able to bathe and dress self with assist, prn from his spouse  Roles and Routines: likes to fish  Equipment Used at Home:   RW, SC, W/C, oxygen  Assistance upon Discharge: spouse, has 2 sons    Pain/Comfort:  Pain Rating 1: 0/10    Patients cultural, spiritual, Taoism conflicts given the current situation: no    Objective:     Communicated with: Elida puentes prior to session.  Patient found HOB elevated with bed alarm, oxygen, pulse ox (continuous), telemetry upon OT entry to room.    General Precautions: Standard, fall   Orthopedic Precautions:N/A   Braces: N/A  Respiratory Status: Nasal  cannula, flow 4 L/min    Occupational Performance:    Bed Mobility:    Patient completed Scooting/Bridging with stand by assistance  Patient completed Supine to Sit with contact guard assistance and HOB elevated and rest break 2* SOB/low O2 sats . Mild tremors present in     Functional Mobility/Transfers:  Patient completed Sit <> Stand Transfer with contact guard assistance  with  rolling walker   Functional Mobility: The patient amb with CGA from bed, into the hallway with PT and to the chair with 4L NC. The patient stood a 2nd time to allow placement of chair alarm for safety.  O2 sats decreased mid 70's-80's with activity and returned to 94% with PLB    Activities of Daily Living:  Upper Body Dressing: contact guard assistance to don back gown  Lower Body Dressing: Mod I to don the left sock and mod assist to don the right sock 2* SOB      Cognitive/Visual Perceptual:  Cognitive/Psychosocial Skills:     -       Oriented to: Person, Place, and Situation   -       Follows Commands/attention:Follows two-step commands  -       Communication: clear/fluent  -       Memory: some difficulty giving a functional history  -       Safety awareness/insight to disability: impaired   -       Mood/Affect/Coping skills/emotional control: Appropriate to situation    Physical Exam:  Balance: -       fair+/fair  Postural examination/scapula alignment:    -       Rounded shoulders  -       Forward head  Skin integrity: Visible skin intact  Edema:  None noted  Sensation:    -       Intact  Upper Extremity Range of Motion:     -       Right Upper Extremity: WFL  -       Left Upper Extremity: WFL  Upper Extremity Strength:    -       Right Upper Extremity: WFL  -       Left Upper Extremity: WFL    AMPAC 6 Click ADL:  AMPAC Total Score: 21    Treatment & Education:  Participated in OT eval  Educated re: OT role and POC  Educated re: Pursed Lipped Breathing verbally, via demonstration and handout  Educated the patient re: need to request  nursing assist to return to bed or amb to the bathroom    Patient left up in chair with all lines intact, call button in reach, chair alarm on, nurse, Elida notified, and [employer] present    GOALS:   Multidisciplinary Problems       Occupational Therapy Goals          Problem: Occupational Therapy    Goal Priority Disciplines Outcome Interventions   Occupational Therapy Goal     OT, PT/OT Ongoing, Progressing    Description: Goals to be met by: 11/18/22     Patient will increase functional independence with ADLs by performing:    UE Dressing with Modified [place] and Supervision.  LE Dressing with Modified [place] and Supervision.  Grooming while standing at sink with Modified [place] and Supervision.  Toileting from toilet with Modified [place], Supervision, and Assistive Devices as needed for hygiene and clothing management.   Supine to sit with Modified [place] and Supervision.  Step transfer with Modified [place] and Supervision  Toilet transfer to toilet with Modified [place] and Supervision.  Upper extremity exercise program x15 reps per handout, with assistance as needed.  Educate the patient re: Pursed Lipped Breathing and Energy Conservation                       History:     Past Medical History:   Diagnosis Date    Combined systolic and diastolic heart failure     Hypertension     Lung cancer     NSCLC    Lung mass     left lung    Peripheral artery disease          Past Surgical History:   Procedure Laterality Date    BRONCHOSCOPY N/A 08/30/2019    Procedure: Bronchoscopy;  Surgeon: Miguel Diagnostic Provider;  Location: [facility] OR [address] [place] [address];  Service: Anesthesiology;  Laterality: N/A;    CORONARY ANGIOGRAPHY N/A 03/04/2022    Procedure: ANGIOGRAM, CORONARY ARTERY;  Surgeon: Robson Green MD;  Location: [facility] CATH LAB;  Service: Cardiology;  Laterality: N/A;    INSERTION OF TUNNELED CENTRAL VENOUS CATHETER (CVC) WITH SUBCUTANEOUS PORT         Time Tracking:     OT Date of  Treatment: 11/04/22  OT Start Time: 1327  OT Stop Time: 1356  OT Total Time (min): 29 min    Billable Minutes:Evaluation 15 (with PT)  Self Care/Home Management 14  Total Time 29    11/4/2022

## 2022-11-04 NOTE — ASSESSMENT & PLAN NOTE
Patient with Hypoxic Respiratory failure which is Acute on chronic.  he is on home oxygen at 2-3 LPM. Supplemental oxygen was provided and noted-  .   Signs/symptoms of respiratory failure include- increased work of breathing and respiratory distress. Contributing diagnoses includes - COPD and progression of malignancy Labs and images were reviewed. Patient Has not had a recent ABG. Will treat underlying causes and adjust management of respiratory failure as follows-   See plan for COPD  Low threshold for ICU escalation

## 2022-11-04 NOTE — PROGRESS NOTES
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     Patient reports developing worsening SOB that started about 2 days prior to presentation. He has a chronic productive cough and lower extremity edema that have not worsened. His wife checked his SpO2 at home and was noted to be 75% on 2-3L O2, prompting him to present to the ED. He had a recent hospitalization (9/28 - 10/7) for hypoxia 2/2 COPD/HF, Afib with RVR, cellulitis and confusion. He was referred to palliative as outpatient but reported that he wants to remain full code at that time. Patient reported that he wants to be a full code at this time as well. While in the ED, the patient was hypoxic (SpO2: 88%) on 8L of supplemental O2., tachypnic and tachycardic (116), Labs showed borderline leukocytosis (11.54), hypokalemia (2.9), elevated BNP (271), elevated lactic acid (2.7). CTA of the chest showed no PE, chronic occlusion of the left mainstem bronchus, chronic consolidative changes in the left upper and lower lobes and right lower lobe and a small to moderate volume right pleural effusion. The patient was admitted for further management.       Overview/Hospital Course:  60 y/o male with lung cancer presents with acute on chronic hypoxemic respiratory failure.  Started on nebs and steroids.        Interval History: feeling a little better.    Review of  Systems   HENT:  Negative for ear discharge and ear pain.    Eyes:  Negative for discharge and itching.   Endocrine: Negative for cold intolerance and heat intolerance.   Neurological:  Negative for seizures and syncope.   Objective:     Vital Signs (Most Recent):  Temp: 98 °F (36.7 °C) (11/04/22 0735)  Pulse: 98 (11/04/22 1045)  Resp: 20 (11/04/22 1045)  BP: 134/81 (11/04/22 0735)  SpO2: (!) 93 % (11/04/22 1045)   Vital Signs (24h Range):  Temp:  [97.6 °F (36.4 °C)-98.5 °F (36.9 °C)] 98 °F (36.7 °C)  Pulse:  [] 98  Resp:  [17-34] 20  SpO2:  [83 %-98 %] 93 %  BP: (122-161)/(78-95) 134/81     Weight: 88.2 kg (194 lb 7.1 oz)  Body mass index is 27.9 kg/m².    Intake/Output Summary (Last 24 hours) at 11/4/2022 1046  Last data filed at 11/4/2022 0950  Gross per 24 hour   Intake 390 ml   Output 1250 ml   Net -860 ml      Physical Exam  Vitals and nursing note reviewed.   Constitutional:       General: He is not in acute distress.     Appearance: Normal appearance. He is not ill-appearing.   HENT:      Head: Normocephalic and atraumatic.      Nose: Nose normal.      Mouth/Throat:      Mouth: Mucous membranes are moist.   Eyes:      Extraocular Movements: Extraocular movements intact.   Cardiovascular:      Rate and Rhythm: Normal rate.      Pulses: Normal pulses.      Heart sounds: No murmur heard.  Pulmonary:      Effort: Respiratory distress present.      Comments: Diminished air entry bilaterally   Abdominal:      General: Abdomen is flat.      Palpations: Abdomen is soft.      Tenderness: There is no abdominal tenderness.   Musculoskeletal:      Right lower leg: No edema.      Left lower leg: No edema.   Skin:     General: Skin is warm.      Capillary Refill: Capillary refill takes less than 2 seconds.   Neurological:      General: No focal deficit present.      Mental Status: He is alert.   Psychiatric:         Mood and Affect: Mood normal.       Significant Labs: All pertinent labs within the past 24 hours  have been reviewed.  BMP:   Recent Labs   Lab 11/04/22  0457   *      K 3.7      CO2 25   BUN 13   CREATININE 0.8   CALCIUM 9.2   MG 1.6     CBC:   Recent Labs   Lab 11/03/22  1434 11/04/22  0457   WBC 11.54 9.95   HGB 12.7* 14.1   HCT 37.0* 43.1   * 167       Significant Imaging: I have reviewed all pertinent imaging results/findings within the past 24 hours.      Assessment/Plan:      * Acute on chronic respiratory failure with hypoxia  Patient with Hypoxic Respiratory failure which is Acute on chronic.  he is on home oxygen at 2-3 LPM. Supplemental oxygen was provided and noted- Oxygen Concentration (%):  [50] 50.   Signs/symptoms of respiratory failure include- increased work of breathing and respiratory distress. Contributing diagnoses includes - COPD and progression of malignancy Labs and images were reviewed. Patient Has not had a recent ABG. Will treat underlying causes and adjust management of respiratory failure as follows-   See plan for COPD  Continue O2 weaning efforts.    ACP (advance care planning)        Debility  PT/OT       Persistent atrial fibrillation  [id number] Score: 1.  Resume home medications         Chronic obstructive pulmonary disease with acute exacerbation  - History of COPD on supplemental O2, 2L NC at home   - Presented with worsening SOB   - Likely related to a COPD exacerbation      Plan:   - Continue steroids.  - Duo-Nebs scheduled  - Wean O2 as tolerated    Coronary artery disease involving native coronary artery of native heart without angina pectoris  Resume home medications     PAD (peripheral artery disease)  Resume home medications       Chronic diastolic heart failure  - Echocardiogram (9/30/2022): EF: 60%, mildly reduced RV systolic function, PA pressure of 40 mmHg.   - Echocardiogram (2/22/2022): EF: 35%-40%, GIDD, normal RV systolic function, mild TR   - Echocardiogram (5/18/2022): EF: 65%, indeterminate LV diastolic function, mild TR, PA  pressure of 54 mmHg  - Patient presented with worsening SOB and AMADEO   - Elevated BNP, but below baseline   - Reported compliance with his Lasix, hypokalemic on presentation   - Doubt exacerbation      Plan:   - Resume home Lasix   - Strict Is/Os    Essential hypertension  Resume home medications       Lung cancer, main bronchus, left  - On maintenance chemotherapy and radiation   - Outpatient follow up with oncology   Palliative Care consult.      VTE Risk Mitigation (From admission, onward)         Ordered     apixaban tablet 5 mg  2 times daily         11/03/22 2031     IP VTE HIGH RISK PATIENT  Once         11/03/22 2019     Place sequential compression device  Until discontinued         11/03/22 2019                Discharge Planning   AZ: 11/7/2022     Code Status: Full Code   Is the patient medically ready for discharge?:     Reason for patient still in hospital (select all that apply): Patient unstable                     Ant Martin MD  Department of Hospital Medicine   [facility] - Telemetry

## 2022-11-04 NOTE — PHARMACY MED REC
"  Medications confirmed by wife.    Admission Medication History     The home medication history was taken by Yelena Mera.    You may go to "Admission" then "Reconcile Home Medications" tabs to review and/or act upon these items.     The home medication list has been updated by the Pharmacy department.   Please read ALL comments highlighted in yellow.   Please address this information as you see fit.    Feel free to contact us if you have any questions or require assistance.      The medications listed below were removed from the home medication list. Please reorder if appropriate:  Patient reports no longer taking the following medication(s):  metoprolol      Medications listed below were obtained from: Patient/family, Analytic software- [employer], and Medical records  (Not in a hospital admission)          Yelena Mera  [phone]               .        "

## 2022-11-04 NOTE — CARE UPDATE
11/03/22 2104   Patient Assessment/Suction   Level of Consciousness (AVPU) alert   Respiratory Effort Pursed lips;Short of breath   Expansion/Accessory Muscles/Retractions no retractions;no use of accessory muscles;expansion symmetric   Rhythm/Pattern, Respiratory shortness of breath;pursed lip breathing   Cough Frequency frequent   Cough Type nonproductive   PRE-TX-O2   O2 Device (Oxygen Therapy) venti mask   Flow (L/min) 15   Oxygen Concentration (%) 50   SpO2 (!) 92 %   Pulse Oximetry Type Continuous   $ Pulse Oximetry - Multiple Charge Pulse Oximetry - Multiple   Pulse 99   Resp (!) 29   BP (!) 148/95   Equipment Change   $ RT Equipment Venti Mask   Ready to Wean/Extubation Screen   FIO2<=50 (chart decimal) 0.5    Pt placed on 50% Venti mask per Dr. Weber. Pt spo2 92%.

## 2022-11-04 NOTE — SUBJECTIVE & OBJECTIVE
Past Medical History:   Diagnosis Date    Combined systolic and diastolic heart failure     Hypertension     Lung cancer     NSCLC    Lung mass     left lung    Peripheral artery disease        Past Surgical History:   Procedure Laterality Date    BRONCHOSCOPY N/A 08/30/2019    Procedure: Bronchoscopy;  Surgeon: Miguel Diagnostic Provider;  Location: [facility] OR [address] [place] [address];  Service: Anesthesiology;  Laterality: N/A;    CORONARY ANGIOGRAPHY N/A 03/04/2022    Procedure: ANGIOGRAM, CORONARY ARTERY;  Surgeon: Robson Green MD;  Location: [facility] CATH LAB;  Service: Cardiology;  Laterality: N/A;    INSERTION OF TUNNELED CENTRAL VENOUS CATHETER (CVC) WITH SUBCUTANEOUS PORT         Review of patient's allergies indicates:  No Known Allergies    Medications:  Continuous Infusions:  Scheduled Meds:   albuterol-ipratropium  3 mL Nebulization Q4H    amLODIPine  10 mg Oral Daily    apixaban  5 mg Oral BID    aspirin  81 mg Oral Daily    atorvastatin  80 mg Oral Daily    cilostazoL  100 mg Oral BID    clopidogreL  75 mg Oral Daily    furosemide  40 mg Oral BID    metoprolol succinate  50 mg Oral BID    polyethylene glycol  17 g Oral Daily    predniSONE  50 mg Oral Daily     PRN Meds:acetaminophen, acetaminophen, albuterol-ipratropium, aluminum-magnesium hydroxide-simethicone, bisacodyL, dextrose 10%, dextrose 10%, glucagon (human recombinant), glucose, glucose, HYDROcodone-acetaminophen, magnesium oxide, magnesium oxide, melatonin, naloxone, ondansetron, potassium bicarbonate, potassium bicarbonate, potassium bicarbonate, potassium, sodium phosphates, potassium, sodium phosphates, potassium, sodium phosphates, prochlorperazine, simethicone, sodium chloride 0.9%    Family History       Problem Relation (Age of Onset)    Cancer Father, Sister    Diabetes Mother    Heart defect Mother    No Known Problems Son          Tobacco Use    Smoking status: Former     Packs/day: 1.00     Years: 25.00     Pack years: 25.00     Types: Cigarettes      Quit date:      Years since quittin.8    Smokeless tobacco: Never   Substance and Sexual Activity    Alcohol use: Yes     Comment: 11/3/22: Occ.    Drug use: Never    Sexual activity: Yes     Partners: Female       Review of Systems   Constitutional:  Positive for activity change, appetite change and fatigue.   Respiratory:  Positive for cough and shortness of breath.    Cardiovascular:  Negative for chest pain.   Musculoskeletal:  Positive for back pain and myalgias.   Neurological:  Positive for weakness.   Objective:     Vital Signs (Most Recent):  Temp: 97.9 °F (36.6 °C) (22 113)  Pulse: (!) 116 (22)  Resp: 18 (22)  BP: (!) 141/79 (22)  SpO2: (!) 90 % (22)   Vital Signs (24h Range):  Temp:  [97.6 °F (36.4 °C)-98.5 °F (36.9 °C)] 97.9 °F (36.6 °C)  Pulse:  [] 116  Resp:  [17-34] 18  SpO2:  [83 %-98 %] 90 %  BP: (122-161)/(78-95) 141/79     Weight: 88.2 kg (194 lb 7.1 oz)  Body mass index is 27.9 kg/m².    Physical Exam  Vitals and nursing note reviewed.   Constitutional:       General: He is not in acute distress.     Appearance: He is ill-appearing. He is not toxic-appearing or diaphoretic.   HENT:      Head: Normocephalic and atraumatic.      Right Ear: External ear normal.      Left Ear: External ear normal.   Eyes:      General:         Right eye: No discharge.         Left eye: No discharge.   Cardiovascular:      Rate and Rhythm: Normal rate and regular rhythm.   Pulmonary:      Effort: No respiratory distress.      Comments: Increased work of breathing, oxygen BNC after not wanting to use mask  Abdominal:      General: Abdomen is flat.      Palpations: Abdomen is soft.   Musculoskeletal:      Right lower leg: Edema present.      Left lower leg: Edema present.   Skin:     General: Skin is warm and dry.   Neurological:      Mental Status: He is alert and oriented to person, place, and time.      Comments: He is forgetful and not at baseline,  he thought year was 2023 until prompted   Psychiatric:         Mood and Affect: Mood normal.         Speech: Speech is delayed.         Behavior: Behavior normal.         Thought Content: Thought content normal.         Judgment: Judgment normal.       Review of Symptoms      Symptom Assessment (ESAS 0-10 Scale)  Pain:  4  Dyspnea:  0  Anxiety:  0  Nausea:  0  Depression:  0  Anorexia:  0  Fatigue:  0  Insomnia:  0  Restlessness:  0  Agitation:  0         Pain Assessment:    Location(s): back    Back       Location: lower        Quality: Aching        Quantity: 8/10 in intensity        Chronicity: Onset 10 month(s) ago, unchanged        Aggravating Factors: Sitting up and standing        Alleviating Factors: Opiates       Associated Symptoms: None    Performance Status:  70    ECOG Performance Status stGstrstastdstest:st st1st Advance Care Planning          Significant Labs: All pertinent labs within the past 24 hours have been reviewed.  CBC:   Recent Labs   Lab 11/04/22 0457   WBC 9.95   HGB 14.1   HCT 43.1   MCV 90        BMP:  Recent Labs   Lab 11/04/22 0457   *      K 3.7      CO2 25   BUN 13   CREATININE 0.8   CALCIUM 9.2   MG 1.6     LFT:  Lab Results   Component Value Date    AST 17 11/03/2022    ALKPHOS 102 11/03/2022    BILITOT 1.0 11/03/2022     Albumin:   Albumin   Date Value Ref Range Status   11/03/2022 3.2 (L) 3.5 - 5.2 g/dL Final     Protein:   Total Protein   Date Value Ref Range Status   11/03/2022 6.9 6.0 - 8.4 g/dL Final     Lactic acid:   Lab Results   Component Value Date    LACTATE 2.7 (H) 11/03/2022    LACTATE 1.9 09/28/2022       Significant Imaging:   I have reviewed all pertinent imaging results/findings within the past 24 hours. CTA chest, CXR

## 2022-11-04 NOTE — ASSESSMENT & PLAN NOTE
Patient with Hypoxic Respiratory failure which is Acute on chronic.  he is on home oxygen at 2-3 LPM. Supplemental oxygen was provided and noted- Oxygen Concentration (%):  [50] 50.   Signs/symptoms of respiratory failure include- increased work of breathing and respiratory distress. Contributing diagnoses includes - COPD and progression of malignancy Labs and images were reviewed. Patient Has not had a recent ABG. Will treat underlying causes and adjust management of respiratory failure as follows-   See plan for COPD  Continue O2 weaning efforts.

## 2022-11-04 NOTE — PLAN OF CARE
Problem: Fall Injury Risk  Goal: Absence of Fall and Fall-Related Injury  Outcome: Ongoing, Progressing     Problem: Breathing Pattern Ineffective  Goal: Effective Breathing Pattern  Intervention: Promote Improved Breathing Pattern  Flowsheets (Taken 11/4/2022 0603)  Airway/Ventilation Management: calming measures promoted  Supportive Measures:   active listening utilized   relaxation techniques promoted  Head of Bed (HOB) Positioning: HOB at 60-90 degrees

## 2022-11-04 NOTE — PLAN OF CARE
Problem: Occupational Therapy  Goal: Occupational Therapy Goal  Description: Goals to be met by: 11/18/22     Patient will increase functional independence with ADLs by performing:    UE Dressing with Modified [place] and Supervision.  LE Dressing with Modified [place] and Supervision.  Grooming while standing at sink with Modified [place] and Supervision.  Toileting from toilet with Modified [place], Supervision, and Assistive Devices as needed for hygiene and clothing management.   Supine to sit with Modified [place] and Supervision.  Step transfer with Modified [place] and Supervision  Toilet transfer to toilet with Modified [place] and Supervision.  Upper extremity exercise program x15 reps per handout, with assistance as needed.  Educate the patient re: Pursed Lipped Breathing and Energy Conservation  Outcome: Ongoing, Progressing     The patient with benefit from HH OT.

## 2022-11-04 NOTE — CONSULTS
[place] - Telemetry  Palliative Medicine  Consult Note    Patient Name: Kashif Iverson  MRN: [id number]  Admission Date: 11/3/2022  Hospital Length of Stay: 1 days  Code Status: Full Code   Attending Provider: Ant Martin MD  Consulting Provider: Eli Arango NP  Primary Care Physician: Guerline Higgins MD  Principal Problem:Acute on chronic respiratory failure with hypoxia    Patient information was obtained from patient, past medical records, ER records and primary team.      Inpatient consult to Palliative Care  Consult performed by: Eli Arango NP  Consult ordered by: Ant Martin MD  Reason for consult: GOC/support        Assessment/Plan:     Advance Care Planning       Palliative care encounter  -Palliative consult received. Patient well known to Palliative. See all ACP notes  -Chart reviewed  -At time of consult patient in bed sitting up after recently changing oxygen mask out to [facility] because he doesn't like it. He is able to maintain sats so far 90 - 92 which is good for COPD. He denies feeling SOB.  -He does seem a forgetful and delayed in his responses but likely due to hypoxia.    Patient states wants to be out of hospital by his birthday in 3 days to go fishing.   -we discussed his continued tx with chemo and his last being on 10/25. He reports he just had more trouble breathing and probably a combination of his COPD and cancer. He does want to continue tx options as long as he is able no matter the burden and feels he tolerates them well although palliative   -he wants to remain a full code. He continue to be independent in most activities he states   -hospice had been discussed with him in the past as an option but this is not in line with his GOC as long as he is tolerating chemo  -addressed all questions  -emotional support provided     -updated DR Martin      Acute on chronic respiratory failure with hypoxia  -Patient with Hypoxic Respiratory failure which is Acute on chronic.  he  is on home oxygen at 2-3 LPM. Supplemental oxygen was provided and noted- Oxygen Concentration (%):  [50] 50.   -tolerating BNC  -mgmt per primary    Lung cancer, main bronchus, left  - On maintenance chemotherapy and radiation   - Outpatient follow up with oncology        - will f/u in outpatient palliative clinic (have seen him previously)        Chronic obstructive pulmonary disease with acute exacerbation  - History of COPD on supplemental O2, 2L NC at home   - Presented with worsening SOB   - Likely related to a COPD exacerbation    -mgmt per primary       Chronic diastolic heart failure  - Echocardiogram (9/30/2022): EF: 60%, mildly reduced RV systolic function, PA pressure of 40 mmHg.   - Echocardiogram (2/22/2022): EF: 35%-40%, GIDD, normal RV systolic function, mild TR   - Echocardiogram (5/18/2022): EF: 65%, indeterminate LV diastolic function, mild TR, PA pressure of 54 mmHg  -mgmt per primary    Persistent atrial fibrillation  [id number] Score: 1.  Mgmt per primary     Coronary artery disease involving native coronary artery of native heart without angina pectoris  noted     PAD (peripheral artery disease)  Essential hypertension   noted    Thank you for your consult. I will follow-up with patient. Please contact us if you have any additional questions.    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      Patient reports developing worsening SOB that started about 2 days prior to presentation. He has a chronic productive cough and lower extremity edema that have not worsened. His wife checked his SpO2 at home and was noted to be 75% on 2-3L O2, prompting him to present to the ED. He had a recent hospitalization (9/28 - 10/7) for hypoxia  2/2 COPD/HF, Afib with RVR, cellulitis and confusion. He was referred to palliative as outpatient but reported that he wants to remain full code at that time. Patient reported that he wants to be a full code at this time as well. While in the ED, the patient was hypoxic (SpO2: 88%) on 8L of supplemental O2., tachypnic and tachycardic (116), Labs showed borderline leukocytosis (11.54), hypokalemia (2.9), elevated BNP (271), elevated lactic acid (2.7). CTA of the chest showed no PE, chronic occlusion of the left mainstem bronchus, chronic consolidative changes in the left upper and lower lobes and right lower lobe and a small to moderate volume right pleural effusion. The patient was admitted for further management.          Hospital Course:  No notes on file        Past Medical History:   Diagnosis Date    Combined systolic and diastolic heart failure     Hypertension     Lung cancer     NSCLC    Lung mass     left lung    Peripheral artery disease        Past Surgical History:   Procedure Laterality Date    BRONCHOSCOPY N/A 08/30/2019    Procedure: Bronchoscopy;  Surgeon: [facility];  Location: [facility] OR [address] [place] [address];  Service: Anesthesiology;  Laterality: N/A;    CORONARY ANGIOGRAPHY N/A 03/04/2022    Procedure: ANGIOGRAM, CORONARY ARTERY;  Surgeon: Robson Green MD;  Location: [facility] CATH LAB;  Service: Cardiology;  Laterality: N/A;    INSERTION OF TUNNELED CENTRAL VENOUS CATHETER (CVC) WITH SUBCUTANEOUS PORT         Review of patient's allergies indicates:  No Known Allergies    Medications:  Continuous Infusions:  Scheduled Meds:   albuterol-ipratropium  3 mL Nebulization Q4H    amLODIPine  10 mg Oral Daily    apixaban  5 mg Oral BID    aspirin  81 mg Oral Daily    atorvastatin  80 mg Oral Daily    cilostazoL  100 mg Oral BID    clopidogreL  75 mg Oral Daily    furosemide  40 mg Oral BID    metoprolol succinate  50 mg Oral BID    polyethylene glycol  17 g Oral Daily    predniSONE  50 mg Oral Daily      PRN Meds:acetaminophen, acetaminophen, albuterol-ipratropium, aluminum-magnesium hydroxide-simethicone, bisacodyL, dextrose 10%, dextrose 10%, glucagon (human recombinant), glucose, glucose, HYDROcodone-acetaminophen, magnesium oxide, magnesium oxide, melatonin, naloxone, ondansetron, potassium bicarbonate, potassium bicarbonate, potassium bicarbonate, potassium, sodium phosphates, potassium, sodium phosphates, potassium, sodium phosphates, prochlorperazine, simethicone, sodium chloride 0.9%    Family History       Problem Relation (Age of Onset)    Cancer Father, Sister    Diabetes Mother    Heart defect Mother    No Known Problems Son          Tobacco Use    Smoking status: Former     Packs/day: 1.00     Years: 25.00     Pack years: 25.00     Types: Cigarettes     Quit date:      Years since quittin.8    Smokeless tobacco: Never   Substance and Sexual Activity    Alcohol use: Yes     Comment: 11/3/22: Occ.    Drug use: Never    Sexual activity: Yes     Partners: Female       Review of Systems   Constitutional:  Positive for activity change, appetite change and fatigue.   Respiratory:  Positive for cough and shortness of breath.    Cardiovascular:  Negative for chest pain.   Musculoskeletal:  Positive for back pain and myalgias.   Neurological:  Positive for weakness.   Objective:     Vital Signs (Most Recent):  Temp: 97.9 °F (36.6 °C) (22 113)  Pulse: (!) 116 (22 113)  Resp: 18 (22 113)  BP: (!) 141/79 (22 113)  SpO2: (!) 90 % (22)   Vital Signs (24h Range):  Temp:  [97.6 °F (36.4 °C)-98.5 °F (36.9 °C)] 97.9 °F (36.6 °C)  Pulse:  [] 116  Resp:  [17-34] 18  SpO2:  [83 %-98 %] 90 %  BP: (122-161)/(78-95) 141/79     Weight: 88.2 kg (194 lb 7.1 oz)  Body mass index is 27.9 kg/m².    Physical Exam  Vitals and nursing note reviewed.   Constitutional:       General: He is not in acute distress.     Appearance: He is ill-appearing. He is not toxic-appearing or  diaphoretic.   HENT:      Head: Normocephalic and atraumatic.      Right Ear: External ear normal.      Left Ear: External ear normal.   Eyes:      General:         Right eye: No discharge.         Left eye: No discharge.   Cardiovascular:      Rate and Rhythm: Normal rate and regular rhythm.   Pulmonary:      Effort: No respiratory distress.      Comments: Increased work of breathing, oxygen BNC after not wanting to use mask  Abdominal:      General: Abdomen is flat.      Palpations: Abdomen is soft.   Musculoskeletal:      Right lower leg: Edema present.      Left lower leg: Edema present.   Skin:     General: Skin is warm and dry.   Neurological:      Mental Status: He is alert and oriented to person, place, and time.      Comments: He is forgetful and not at baseline, he thought year was 2023 until prompted   Psychiatric:         Mood and Affect: Mood normal.         Speech: Speech is delayed.         Behavior: Behavior normal.         Thought Content: Thought content normal.         Judgment: Judgment normal.       Review of Symptoms      Symptom Assessment (ESAS 0-10 Scale)  Pain:  4  Dyspnea:  0  Anxiety:  0  Nausea:  0  Depression:  0  Anorexia:  0  Fatigue:  0  Insomnia:  0  Restlessness:  0  Agitation:  0         Pain Assessment:    Location(s): back    Back       Location: lower        Quality: Aching        Quantity: 8/10 in intensity        Chronicity: Onset 10 month(s) ago, unchanged        Aggravating Factors: Sitting up and standing        Alleviating Factors: Opiates       Associated Symptoms: None    Performance Status:  70    ECOG Performance Status rdGrdrrdarddrderd:rd rd3rd Advance Care Planning          Significant Labs: All pertinent labs within the past 24 hours have been reviewed.  CBC:   Recent Labs   Lab 11/04/22 0457   WBC 9.95   HGB 14.1   HCT 43.1   MCV 90        BMP:  Recent Labs   Lab 11/04/22 0457   *      K 3.7      CO2 25   BUN 13   CREATININE 0.8   CALCIUM 9.2   MG  1.6     LFT:  Lab Results   Component Value Date    AST 17 11/03/2022    ALKPHOS 102 11/03/2022    BILITOT 1.0 11/03/2022     Albumin:   Albumin   Date Value Ref Range Status   11/03/2022 3.2 (L) 3.5 - 5.2 g/dL Final     Protein:   Total Protein   Date Value Ref Range Status   11/03/2022 6.9 6.0 - 8.4 g/dL Final     Lactic acid:   Lab Results   Component Value Date    LACTATE 2.7 (H) 11/03/2022    LACTATE 1.9 09/28/2022       Significant Imaging:   I have reviewed all pertinent imaging results/findings within the past 24 hours. CTA chest, CXR       > 50% of 70 min visit spent in chart review, face to face discussion of goals of care,  symptom assessment, coordination of care and emotional support.    Eli Arango NP  Palliative Medicine  [facility] [place] [facility]

## 2022-11-04 NOTE — HOSPITAL COURSE
62 y/o male with lung cancer presents with acute on chronic hypoxemic respiratory failure.  Started on nebs and steroids.  Able to wean down oxygen.  Hospitalization complicated by delirium.  Unremarkable Head CT.

## 2022-11-04 NOTE — HPI
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     Patient reports developing worsening SOB that started about 2 days prior to presentation. He has a chronic productive cough and lower extremity edema that have not worsened. His wife checked his SpO2 at home and was noted to be 75% on 2-3L O2, prompting him to present to the ED. He had a recent hospitalization (9/28 - 10/7) for hypoxia 2/2 COPD/HF, Afib with RVR, cellulitis and confusion. He was referred to palliative as outpatient but reported that he wants to remain full code at that time. Patient reported that he wants to be a full code at this time as well. While in the ED, the patient was hypoxic (SpO2: 88%) on 8L of supplemental O2., tachypnic and tachycardic (116), Labs showed borderline leukocytosis (11.54), hypokalemia (2.9), elevated BNP (271), elevated lactic acid (2.7). CTA of the chest showed no PE, chronic occlusion of the left mainstem bronchus, chronic consolidative changes in the left upper and lower lobes and right lower lobe and a small to moderate volume right pleural effusion. The patient was admitted for further management.

## 2022-11-04 NOTE — H&P
[facility]  [facility] Medicine  History & Physical    Patient Name: Kashif Iverson  MRN: [id number]  Patient Class: IP- Inpatient  Admission Date: 11/3/2022  Attending Physician: Ant Martin MD   Primary Care Provider: Guerline Higgins MD         Patient information was obtained from patient and ER records.     Subjective:     Principal Problem:Acute on chronic respiratory failure with hypoxia    Chief Complaint:   Chief Complaint   Patient presents with    Shortness of Breath     Pt c/o SOB since this morning. Pt stated his SPO2 has been in the 70s on 2 LPM of O2. Pt 88% on 3 LPM at this time.        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     Patient reports developing worsening SOB that started about 2 days prior to presentation. He has a chronic productive cough and lower extremity edema that have not worsened. His wife checked his SpO2 at home and was noted to be 75% on 2-3L O2, prompting him to present to the ED. He had a recent hospitalization (9/28 - 10/7) for hypoxia 2/2 COPD/HF, Afib with RVR, cellulitis and confusion. He was referred to palliative as outpatient but reported that he wants to remain full code at that time. Patient reported that he wants to be a full code at this time as well. While in the ED, the patient was hypoxic (SpO2: 88%) on 8L of supplemental O2., tachypnic and tachycardic (116), Labs showed borderline leukocytosis (11.54), hypokalemia (2.9), elevated BNP (271), elevated lactic acid (2.7). CTA of the chest showed no PE, chronic occlusion of the left mainstem bronchus, chronic consolidative changes in the left upper and lower lobes and right lower lobe and a small to moderate volume right pleural effusion. The  patient was admitted for further management.       Past Medical History:   Diagnosis Date    Combined systolic and diastolic heart failure     Hypertension     Lung cancer     NSCLC    Lung mass     left lung    Peripheral artery disease        Past Surgical History:   Procedure Laterality Date    BRONCHOSCOPY N/A 08/30/2019    Procedure: Bronchoscopy;  Surgeon: [facility] Diagnostic Provider;  Location: [facility] OR [address] [place] [address];  Service: Anesthesiology;  Laterality: N/A;    CORONARY ANGIOGRAPHY N/A 03/04/2022    Procedure: ANGIOGRAM, CORONARY ARTERY;  Surgeon: Robson Green MD;  Location: [facility] CATH LAB;  Service: Cardiology;  Laterality: N/A;    INSERTION OF TUNNELED CENTRAL VENOUS CATHETER (CVC) WITH SUBCUTANEOUS PORT         Review of patient's allergies indicates:  No Known Allergies    No current facility-administered medications on file prior to encounter.     Current Outpatient Medications on File Prior to Encounter   Medication Sig    albuterol (VENTOLIN HFA) 90 mcg/actuation inhaler Inhale 2 puffs into the lungs every 6 (six) hours as needed for Wheezing. Rescue    amLODIPine (NORVASC) 10 MG tablet Take 1 tablet (10 mg total) by mouth once daily.    ascorbic acid-multivit-min (VITAMIN C ENERGY BOOSTER) 1,000 mg PwEP Take 3,000 mg by mouth once daily. Patient takes 3,000 mg per day    aspirin 81 MG Chew Take 1 tablet (81 mg total) by mouth once daily.    atorvastatin (LIPITOR) 80 MG tablet Take 1 tablet (80 mg total) by mouth once daily.    cilostazoL (PLETAL) 100 MG Tab Take 1 tablet (100 mg total) by mouth 2 (two) times daily.    clopidogreL (PLAVIX) 75 mg tablet Take 1 tablet (75 mg total) by mouth once daily.    furosemide (LASIX) 40 MG tablet Take 1 tablet (40 mg total) by mouth 2 (two) times a day.    fluticasone-umeclidin-vilanter (TRELEGY ELLIPTA) 100-62.5-25 mcg DsDv Inhale 1 puff into the lungs once daily.    metoprolol succinate (TOPROL-XL) 50 MG 24 hr tablet Take 1 tablet (50 mg total) by  mouth 2 (two) times daily.    [DISCONTINUED] apixaban (ELIQUIS) 5 mg Tab Take 1 tablet (5 mg total) by mouth 2 (two) times daily.     Family History       Problem Relation (Age of Onset)    Cancer Father, Sister    Diabetes Mother    Heart defect Mother    No Known Problems Son          Tobacco Use    Smoking status: Former     Packs/day: 1.00     Years: 25.00     Pack years: 25.00     Types: Cigarettes     Quit date:      Years since quittin.8    Smokeless tobacco: Never   Substance and Sexual Activity    Alcohol use: Yes     Comment: 11/3/22: Occ.    Drug use: Never    Sexual activity: Yes     Partners: Female     Review of Systems   Constitutional: Negative.    HENT: Negative.     Eyes: Negative.    Respiratory:  Positive for cough and shortness of breath.    Cardiovascular:  Positive for leg swelling.   Gastrointestinal: Negative.    Endocrine: Negative.    Genitourinary: Negative.    Musculoskeletal: Negative.    Skin: Negative.    Allergic/Immunologic: Negative.    Neurological: Negative.    Psychiatric/Behavioral: Negative.     Objective:     Vital Signs (Most Recent):  Temp: 98.5 °F (36.9 °C) (22 1701)  Pulse: (!) 116 (22)  Resp: (!) 34 (22)  BP: 123/82 (22 1833)  SpO2: (!) 88 % (22)   Vital Signs (24h Range):  Temp:  [98.5 °F (36.9 °C)] 98.5 °F (36.9 °C)  Pulse:  [] 116  Resp:  [17-34] 34  SpO2:  [88 %-95 %] 88 %  BP: (122-161)/(78-95) 123/82     Weight: 88.9 kg (196 lb)  Body mass index is 28.12 kg/m².    Physical Exam  Vitals and nursing note reviewed.   Constitutional:       General: He is not in acute distress.     Appearance: Normal appearance. He is not ill-appearing.   HENT:      Head: Normocephalic and atraumatic.      Nose: Nose normal.      Mouth/Throat:      Mouth: Mucous membranes are moist.   Eyes:      Extraocular Movements: Extraocular movements intact.   Cardiovascular:      Rate and Rhythm: Normal rate.      Pulses: Normal  pulses.      Heart sounds: No murmur heard.  Pulmonary:      Effort: Respiratory distress present.      Comments: Diminished air entry bilaterally   On 8L O2  Abdominal:      General: Abdomen is flat.      Palpations: Abdomen is soft.      Tenderness: There is no abdominal tenderness.   Musculoskeletal:      Right lower leg: No edema.      Left lower leg: No edema.   Skin:     General: Skin is warm.      Capillary Refill: Capillary refill takes less than 2 seconds.   Neurological:      General: No focal deficit present.      Mental Status: He is alert.   Psychiatric:         Mood and Affect: Mood normal.           Significant Labs: All pertinent labs within the past 24 hours have been reviewed.    Significant Imaging: I have reviewed all pertinent imaging results/findings within the past 24 hours.    Assessment/Plan:     * Acute on chronic respiratory failure with hypoxia  Patient with Hypoxic Respiratory failure which is Acute on chronic.  he is on home oxygen at 2-3 LPM. Supplemental oxygen was provided and noted-  .   Signs/symptoms of respiratory failure include- increased work of breathing and respiratory distress. Contributing diagnoses includes - COPD and progression of malignancy Labs and images were reviewed. Patient Has not had a recent ABG. Will treat underlying causes and adjust management of respiratory failure as follows-   See plan for COPD  Low threshold for ICU escalation     ACP (advance care planning)  Patient wants to remain full code at this time       Debility  PT/OT       Persistent atrial fibrillation  [id number] Score: 1.  Resume home medications         Chronic obstructive pulmonary disease with acute exacerbation  - History of COPD on supplemental O2, 2L NC at home   - Presented with worsening SOB   - Likely related to a COPD exacerbation      Plan:   - Completed Prednisone 40 mg   - Duo-Nebs scheduled  - Wean O2 as tolerated    Coronary artery disease involving native coronary artery of  native heart without angina pectoris  Resume home medications     PAD (peripheral artery disease)  Resume home medications       Chronic diastolic heart failure  - Echocardiogram (9/30/2022): EF: 60%, mildly reduced RV systolic function, PA pressure of 40 mmHg.   - Echocardiogram (2/22/2022): EF: 35%-40%, GIDD, normal RV systolic function, mild TR   - Echocardiogram (5/18/2022): EF: 65%, indeterminate LV diastolic function, mild TR, PA pressure of 54 mmHg  - Patient presented with worsening SOB and AMADEO   - Elevated BNP, but below baseline   - Reported compliance with his Lasix, hypokalemic on presentation   - Doubt exacerbation      Plan:   - Resume home Lasix   - Strict Is/Os    Essential hypertension  Resume home medications       Lung cancer, main bronchus, left  - On maintenance chemotherapy and radiation   - Outpatient follow up with palliative care/oncology     VTE Risk Mitigation (From admission, onward)         Ordered     apixaban tablet 5 mg  2 times daily         11/03/22 2031     IP VTE HIGH RISK PATIENT  Once         11/03/22 2019     Place sequential compression device  Until discontinued         11/03/22 2019                   David Marroquin MD  Department of Hospital Medicine   [facility] - Emergency Dept

## 2022-11-04 NOTE — ASSESSMENT & PLAN NOTE
- On maintenance chemotherapy and radiation   - Outpatient follow up with palliative care/oncology

## 2022-11-04 NOTE — ASSESSMENT & PLAN NOTE
- History of COPD on supplemental O2, 2L NC at home   - Presented with worsening SOB   - Likely related to a COPD exacerbation      Plan:   - Completed Prednisone 40 mg   - Duo-Nebs scheduled  - Wean O2 as tolerated

## 2022-11-04 NOTE — PLAN OF CARE
[facility] [place] - Telemetry  Initial Discharge Assessment       Primary Care Provider: Guerline Higgins MD    Admission Diagnosis: Shortness of breath [R06.02]  SOB (shortness of breath) [R06.02]  Chest pain [R07.9]    Admission Date: 11/3/2022  Expected Discharge Date: 11/7/2022    CM discussed discharge planning with pt and pt's wife, Natty. Pt's wife inquired how to apply for financial assistance. CM instructed pt's wife to contact billing for assistance. Assessment completed and role of CM discussed. Plan A ( home with family) and Plan B ( other-tbd).    Discharge Barriers Identified: (P) None    Payor: MEDICARE / Plan: MEDICARE PART A & B / Product Type: Government /     Extended Emergency Contact Information  Primary Emergency Contact: Natty Iverson  Mobile Phone: [phone]  Relation: Spouse  Preferred language: English   needed? No  Secondary Emergency Contact: Rolando Iverson  Mobile Phone: [phone]  Relation: Brother  Preferred language: English   needed? No    Discharge Plan A: (P) Home with family  Discharge Plan B: (P) Other (tbd)      [id number] [address] EX[address] AT [facility] & [address] [address]  Phone: [phone] Fax: [phone]    [facility] [address]  [address]   [facility] [address]  Phone: [phone] Fax: [phone]      Initial Assessment (most recent)       Adult Discharge Assessment - 11/04/22 1404          Discharge Assessment    Assessment Type Discharge Planning Assessment     Confirmed/corrected address, phone number and insurance Yes     Confirmed Demographics Correct on Facesheet     Source of Information patient;family     When was your last doctors appointment? 10/25/22 (P)      Reason For Admission Acute on chronic respiratory failure with hypoxia (P)      Lives With spouse (P)      Facility Arrived From: Home (P)      Do you expect to return to your current living  situation? Yes (P)      Do you have help at home or someone to help you manage your care at home? No (P)      Prior to hospitilization cognitive status: Alert/Oriented (P)      Current cognitive status: Alert/Oriented (P)      Walking or Climbing Stairs Difficulty none (P)      Dressing/Bathing Difficulty none (P)      Equipment Currently Used at Home oxygen (P)      Readmission within 30 days? No (P)      Patient currently being followed by outpatient case management? No (P)      Do you currently have service(s) that help you manage your care at home? No (P)      Do you take prescription medications? Yes (P)      Do you have prescription coverage? Yes (P)      Coverage MEDICARE AB (P)      Do you have any problems affording any of your prescribed medications? No (P)      Is the patient taking medications as prescribed? yes (P)      Who is going to help you get home at discharge? Natty- wife (P)      How do you get to doctors appointments? family or friend will provide (P)      Are you on dialysis? No (P)      Do you take coumadin? No (P)      Discharge Plan A Home with family (P)      Discharge Plan B Other (P)    tbd    DME Needed Upon Discharge  other (see comments) (P)    tbd    Discharge Plan discussed with: Patient;Spouse/sig other (P)      Name(s) and Number(s) Natty Iverson (Spouse)   [phone] (P)      Discharge Barriers Identified None (P)         Relationship/Environment    Name(s) of Who Lives With Patient Natty- wife (P)

## 2022-11-04 NOTE — ASSESSMENT & PLAN NOTE
- On maintenance chemotherapy and radiation   - Outpatient follow up with oncology   Palliative Care consult.

## 2022-11-04 NOTE — HPI
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      Patient reports developing worsening SOB that started about 2 days prior to presentation. He has a chronic productive cough and lower extremity edema that have not worsened. His wife checked his SpO2 at home and was noted to be 75% on 2-3L O2, prompting him to present to the ED. He had a recent hospitalization (9/28 - 10/7) for hypoxia 2/2 COPD/HF, Afib with RVR, cellulitis and confusion. He was referred to palliative as outpatient but reported that he wants to remain full code at that time. Patient reported that he wants to be a full code at this time as well. While in the ED, the patient was hypoxic (SpO2: 88%) on 8L of supplemental O2., tachypnic and tachycardic (116), Labs showed borderline leukocytosis (11.54), hypokalemia (2.9), elevated BNP (271), elevated lactic acid (2.7). CTA of the chest showed no PE, chronic occlusion of the left mainstem bronchus, chronic consolidative changes in the left upper and lower lobes and right lower lobe and a small to moderate volume right pleural effusion. The patient was admitted for further management.

## 2022-11-04 NOTE — PT/OT/SLP EVAL
Physical Therapy Evaluation    Patient Name:  Kashif Iverson   MRN:  [id number]    Recommendations:     Discharge Recommendations:  home health PT (Family support)   Discharge Equipment Recommendations: hospital bed, bath bench   Barriers to discharge:  None from PT standpoint, Increased O2 demand    Assessment:     Kashif Iverson is a 61 y.o. male admitted with a medical diagnosis of Acute on chronic respiratory failure with hypoxia.  He presents with the following impairments/functional limitations:  weakness, gait instability, impaired cardiopulmonary response to activity, impaired endurance, impaired balance, impaired coordination, decreased safety awareness, impaired self care skills, impaired functional mobility, decreased coordination .    Rehab Prognosis: Fair; patient would benefit from acute skilled PT services to address these deficits and reach maximum level of function.    Recent Surgery: * No surgery found *      Plan:     During this hospitalization, patient to be seen  (2-3x/wk) to address the identified rehab impairments via gait training, therapeutic activities, therapeutic exercises and progress toward the following goals:    Plan of Care Expires:  11/18/22    Subjective     Chief Complaint: SOB  Patient/Family Comments/goals: Pt agreeable to sit up in chair  Pain/Comfort:  Pain Rating 1: 0/10    Patients cultural, spiritual, Church conflicts given the current situation: no    Living Environment:  Pt lives with his spouse in a [facility] with no concerns  Prior to admission, patients level of function was Independent with ambulation and ADL's per pt.  Equipment used at home: oxygen.  DME owned (not currently used): rolling walker, single point cane, and wheelchair  Upon discharge, patient will have assistance from Spouse and son.    Objective:     Communicated with nsg prior to session.  Patient found  sitting up in bed  with bed alarm, oxygen  upon PT entry to room.    General Precautions: Standard, fall    Orthopedic Precautions:N/A   Braces: N/A  Respiratory Status: Nasal cannula, flow 4 L/min    Exams:  Cognitive Exam:  Patient is oriented to Person and Place  Gross Motor Coordination:  mildly impaired 2/2 tremors, gen weakness, and deconditioning  Postural Exam:  Patient presented with the following abnormalities:    -       Rounded shoulders  -       Forward head  Sensation:    -       Intact  light/touch B LE's  Skin Integrity/Edema:      -       Skin integrity: Discolored LE's  -       Edema: None noted    RLE ROM: WFL  RLE Strength: WFL  LLE ROM: WFL  LLE Strength: WFL    Functional Mobility:  Bed Mobility:     Scooting: stand by assistance  Supine to Sit: stand by assistance  Transfers:     Sit to Stand:  contact guard assistance with no AD  Gait: Pt ambulated with CGA approx 35'  Balance: FAir+ sit, FAir stand      AM-PAC 6 CLICK MOBILITY  Total Score:18       Treatment & Education:  Handout provided to and Pt educated to perform Pursed Lip breathing 2/2 SPO2 in 70'-80's with minimal activity.  Needs reinforcement.  Pt educated to perform B AP's and TKE's while up in chair.  Needs reinforcement    Patient left up in chair with all lines intact, call button in reach, chair alarm on, and nsg notified.    GOALS:   Multidisciplinary Problems       Physical Therapy Goals          Problem: Physical Therapy    Goal Priority Disciplines Outcome Goal Variances Interventions   Physical Therapy Goal     PT, PT/OT Ongoing, Progressing     Description: Goals to be met by: 22     Patient will increase functional independence with mobility by performin. Supine to sit with Modified [place]  2. Sit to stand transfer with Modified [place]  3. Gait  x 25-50 feet with Modified [place]    4. Lower extremity exercise program x10 reps per handout, with supervision                         History:     Past Medical History:   Diagnosis Date    Combined systolic and diastolic heart failure      Hypertension     Lung cancer     NSCLC    Lung mass     left lung    Peripheral artery disease        Past Surgical History:   Procedure Laterality Date    BRONCHOSCOPY N/A 08/30/2019    Procedure: Bronchoscopy;  Surgeon: Miguel Diagnostic Provider;  Location: [facility] OR [address] [place] [address];  Service: Anesthesiology;  Laterality: N/A;    CORONARY ANGIOGRAPHY N/A 03/04/2022    Procedure: ANGIOGRAM, CORONARY ARTERY;  Surgeon: Robson Green MD;  Location: [facility] CATH LAB;  Service: Cardiology;  Laterality: N/A;    INSERTION OF TUNNELED CENTRAL VENOUS CATHETER (CVC) WITH SUBCUTANEOUS PORT         Time Tracking:     PT Received On: 11/04/22  PT Start Time: 1326     PT Stop Time: 1358  PT Total Time (min): 32 min     Billable Minutes: Evaluation 15 and Therapeutic Activity 17      11/04/2022

## 2022-11-04 NOTE — NURSING TRANSFER
Nursing Transfer Note      11/4/2022     Reason patient is being transferred: continuation of care    Transfer To: telemetry, from ED    Transfer via stretcher    Transfer with  to O2    Transported by transporter    Medicines sent: none    Any special needs or follow-up needed: resp treatments, close attention to O2 sats    Chart send with patient: Yes    Notified: Spouse aware    Patient reassessed at: 11/3/2022, 2305    Upon arrival to floor: cardiac monitor applied, patient oriented to room, call bell in reach, and bed in lowest position. Venti mask in place at 50%, O2 95%. Explained plan of care. Verbalized understanding. Yellow non-skid socks in place. Side rails up X 3.

## 2022-11-04 NOTE — PLAN OF CARE
Problem: Fall Injury Risk  Goal: Absence of Fall and Fall-Related Injury  Outcome: Ongoing, Progressing     Problem: Adult Inpatient Plan of Care  Goal: Plan of Care Review  Outcome: Ongoing, Progressing  Goal: Patient-Specific Goal (Individualized)  Outcome: Ongoing, Progressing  Goal: Absence of Hospital-Acquired Illness or Injury  Outcome: Ongoing, Progressing  Goal: Optimal Comfort and Wellbeing  Outcome: Ongoing, Progressing  Goal: Readiness for Transition of Care  Outcome: Ongoing, Progressing     Problem: Infection  Goal: Absence of Infection Signs and Symptoms  Outcome: Ongoing, Progressing     Problem: Breathing Pattern Ineffective  Goal: Effective Breathing Pattern  Outcome: Ongoing, Progressing   No falls, safety maintained. Patient up in chair for second half of day, chair alarm in use. No c/o pain. 4L O2 nc which was weaned from venti mask, sats mid 90s, desats with exertion. No c/o pain. VSS, afebrile

## 2022-11-04 NOTE — ASSESSMENT & PLAN NOTE
- Echocardiogram (9/30/2022): EF: 60%, mildly reduced RV systolic function, PA pressure of 40 mmHg.   - Echocardiogram (2/22/2022): EF: 35%-40%, GIDD, normal RV systolic function, mild TR   - Echocardiogram (5/18/2022): EF: 65%, indeterminate LV diastolic function, mild TR, PA pressure of 54 mmHg  - Patient presented with worsening SOB and AMADEO   - Elevated BNP, but below baseline   - Reported compliance with his Lasix, hypokalemic on presentation   - Doubt exacerbation      Plan:   - Resume home Lasix   - Strict Is/Os

## 2022-11-05 NOTE — PROGRESS NOTES
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     Patient reports developing worsening SOB that started about 2 days prior to presentation. He has a chronic productive cough and lower extremity edema that have not worsened. His wife checked his SpO2 at home and was noted to be 75% on 2-3L O2, prompting him to present to the ED. He had a recent hospitalization (9/28 - 10/7) for hypoxia 2/2 COPD/HF, Afib with RVR, cellulitis and confusion. He was referred to palliative as outpatient but reported that he wants to remain full code at that time. Patient reported that he wants to be a full code at this time as well. While in the ED, the patient was hypoxic (SpO2: 88%) on 8L of supplemental O2., tachypnic and tachycardic (116), Labs showed borderline leukocytosis (11.54), hypokalemia (2.9), elevated BNP (271), elevated lactic acid (2.7). CTA of the chest showed no PE, chronic occlusion of the left mainstem bronchus, chronic consolidative changes in the left upper and lower lobes and right lower lobe and a small to moderate volume right pleural effusion. The patient was admitted for further management.       Overview/Hospital Course:  62 y/o male with lung cancer presents with acute on chronic hypoxemic respiratory failure.  Started on nebs and steroids.        Interval History: able to wean down oxygen.  Episodes of  confusion.    Review of Systems   HENT:  Negative for ear discharge and ear pain.    Eyes:  Negative for discharge and itching.   Endocrine: Negative for cold intolerance and heat intolerance.   Neurological:  Negative for seizures and syncope.   Objective:     Vital Signs (Most Recent):  Temp: 98.2 °F (36.8 °C) (11/05/22 0728)  Pulse: 74 (11/05/22 0924)  Resp: 16 (11/05/22 0924)  BP: 125/76 (11/05/22 0728)  SpO2: 98 % (11/05/22 0924)   Vital Signs (24h Range):  Temp:  [97.3 °F (36.3 °C)-98.6 °F (37 °C)] 98.2 °F (36.8 °C)  Pulse:  [] 74  Resp:  [16-20] 16  SpO2:  [90 %-98 %] 98 %  BP: (117-141)/(58-90) 125/76     Weight: 88.2 kg (194 lb 7.1 oz)  Body mass index is 27.9 kg/m².    Intake/Output Summary (Last 24 hours) at 11/5/2022 1027  Last data filed at 11/5/2022 0900  Gross per 24 hour   Intake 660 ml   Output 1300 ml   Net -640 ml        Physical Exam  Vitals and nursing note reviewed.   Constitutional:       General: He is not in acute distress.     Appearance: Normal appearance. He is not ill-appearing.   HENT:      Head: Normocephalic and atraumatic.      Nose: Nose normal.      Mouth/Throat:      Mouth: Mucous membranes are moist.   Eyes:      Extraocular Movements: Extraocular movements intact.   Cardiovascular:      Rate and Rhythm: Normal rate.      Pulses: Normal pulses.      Heart sounds: No murmur heard.  Pulmonary:      Effort: No respiratory distress.      Comments: Diminished air entry bilaterally   Abdominal:      General: Abdomen is flat.      Palpations: Abdomen is soft.      Tenderness: There is no abdominal tenderness.   Musculoskeletal:      Right lower leg: No edema.      Left lower leg: No edema.   Skin:     General: Skin is warm.      Capillary Refill: Capillary refill takes less than 2 seconds.   Neurological:      General: No focal deficit present.      Mental Status: He is alert.      Comments: Oriented, but episodes of confusion and tangential speech.   Psychiatric:         Mood and  Affect: Mood normal.       Significant Labs: All pertinent labs within the past 24 hours have been reviewed.  BMP:   Recent Labs   Lab 11/04/22  0457   *      K 3.7      CO2 25   BUN 13   CREATININE 0.8   CALCIUM 9.2   MG 1.6       CBC:   Recent Labs   Lab 11/03/22  1434 11/04/22  0457   WBC 11.54 9.95   HGB 12.7* 14.1   HCT 37.0* 43.1   * 167         Significant Imaging: I have reviewed all pertinent imaging results/findings within the past 24 hours.      Assessment/Plan:      * Acute on chronic respiratory failure with hypoxia  Patient with Hypoxic Respiratory failure which is Acute on chronic.  he is on home oxygen at 2-3 LPM. Supplemental oxygen was provided and noted- Oxygen Concentration (%):  [40] 40.   Signs/symptoms of respiratory failure include- increased work of breathing and respiratory distress. Contributing diagnoses includes - COPD and progression of malignancy Labs and images were reviewed. Patient Has not had a recent ABG. Will treat underlying causes and adjust management of respiratory failure as follows-   See plan for COPD  Continue O2 weaning efforts.    ACP (advance care planning)        Debility  PT/OT       Persistent atrial fibrillation  [id number] Score: 1.  Resume home medications         Chronic obstructive pulmonary disease with acute exacerbation  - History of COPD on supplemental O2, 2L NC at home   - Presented with worsening SOB   - Likely related to a COPD exacerbation      Plan:   - Continue steroids.  - Duo-Nebs scheduled  - Wean O2 as tolerated    Coronary artery disease involving native coronary artery of native heart without angina pectoris  Resume home medications     PAD (peripheral artery disease)  Resume home medications       Chronic diastolic heart failure  - Echocardiogram (9/30/2022): EF: 60%, mildly reduced RV systolic function, PA pressure of 40 mmHg.   - Echocardiogram (2/22/2022): EF: 35%-40%, GIDD, normal RV systolic function, mild TR   -  Echocardiogram (5/18/2022): EF: 65%, indeterminate LV diastolic function, mild TR, PA pressure of 54 mmHg  - Patient presented with worsening SOB and AMADEO   - Elevated BNP, but below baseline   - Reported compliance with his Lasix, hypokalemic on presentation   - Doubt exacerbation      Plan:   - Resume home Lasix   - Strict Is/Os    Palliative care encounter        Essential hypertension  Resume home medications       Lung cancer, main bronchus, left  - On maintenance chemotherapy and radiation   - Outpatient follow up with oncology   Palliative Care consult.  Episodes of confusion and tangential speech.  Will get CT of Head.      VTE Risk Mitigation (From admission, onward)         Ordered     apixaban tablet 5 mg  2 times daily         11/03/22 2031     IP VTE HIGH RISK PATIENT  Once         11/03/22 2019     Place sequential compression device  Until discontinued         11/03/22 2019                Discharge Planning   [place]: 11/7/2022     Code Status: Full Code   Is the patient medically ready for discharge?:     Reason for patient still in hospital (select all that apply): Patient trending condition  Discharge Plan A: Home with family                  Ant Martin MD  Department of Hospital Medicine   [facility] [place] - Telemetry

## 2022-11-05 NOTE — ASSESSMENT & PLAN NOTE
Patient with Hypoxic Respiratory failure which is Acute on chronic.  he is on home oxygen at 2-3 LPM. Supplemental oxygen was provided and noted- Oxygen Concentration (%):  [40] 40.   Signs/symptoms of respiratory failure include- increased work of breathing and respiratory distress. Contributing diagnoses includes - COPD and progression of malignancy Labs and images were reviewed. Patient Has not had a recent ABG. Will treat underlying causes and adjust management of respiratory failure as follows-   See plan for COPD  Continue O2 weaning efforts.

## 2022-11-05 NOTE — ASSESSMENT & PLAN NOTE
- On maintenance chemotherapy and radiation   - Outpatient follow up with oncology   Palliative Care consult.  Episodes of confusion and tangential speech.  Will get CT of Head.

## 2022-11-05 NOTE — SUBJECTIVE & OBJECTIVE
Interval History: able to wean down oxygen.  Episodes of confusion.    Review of Systems   HENT:  Negative for ear discharge and ear pain.    Eyes:  Negative for discharge and itching.   Endocrine: Negative for cold intolerance and heat intolerance.   Neurological:  Negative for seizures and syncope.   Objective:     Vital Signs (Most Recent):  Temp: 98.2 °F (36.8 °C) (11/05/22 0728)  Pulse: 74 (11/05/22 0924)  Resp: 16 (11/05/22 0924)  BP: 125/76 (11/05/22 0728)  SpO2: 98 % (11/05/22 0924)   Vital Signs (24h Range):  Temp:  [97.3 °F (36.3 °C)-98.6 °F (37 °C)] 98.2 °F (36.8 °C)  Pulse:  [] 74  Resp:  [16-20] 16  SpO2:  [90 %-98 %] 98 %  BP: (117-141)/(58-90) 125/76     Weight: 88.2 kg (194 lb 7.1 oz)  Body mass index is 27.9 kg/m².    Intake/Output Summary (Last 24 hours) at 11/5/2022 1027  Last data filed at 11/5/2022 0900  Gross per 24 hour   Intake 660 ml   Output 1300 ml   Net -640 ml        Physical Exam  Vitals and nursing note reviewed.   Constitutional:       General: He is not in acute distress.     Appearance: Normal appearance. He is not ill-appearing.   HENT:      Head: Normocephalic and atraumatic.      Nose: Nose normal.      Mouth/Throat:      Mouth: Mucous membranes are moist.   Eyes:      Extraocular Movements: Extraocular movements intact.   Cardiovascular:      Rate and Rhythm: Normal rate.      Pulses: Normal pulses.      Heart sounds: No murmur heard.  Pulmonary:      Effort: No respiratory distress.      Comments: Diminished air entry bilaterally   Abdominal:      General: Abdomen is flat.      Palpations: Abdomen is soft.      Tenderness: There is no abdominal tenderness.   Musculoskeletal:      Right lower leg: No edema.      Left lower leg: No edema.   Skin:     General: Skin is warm.      Capillary Refill: Capillary refill takes less than 2 seconds.   Neurological:      General: No focal deficit present.      Mental Status: He is alert.      Comments: Oriented, but episodes of  confusion and tangential speech.   Psychiatric:         Mood and Affect: Mood normal.       Significant Labs: All pertinent labs within the past 24 hours have been reviewed.  BMP:   Recent Labs   Lab 11/04/22  0457   *      K 3.7      CO2 25   BUN 13   CREATININE 0.8   CALCIUM 9.2   MG 1.6       CBC:   Recent Labs   Lab 11/03/22  1434 11/04/22  0457   WBC 11.54 9.95   HGB 12.7* 14.1   HCT 37.0* 43.1   * 167         Significant Imaging: I have reviewed all pertinent imaging results/findings within the past 24 hours.

## 2022-11-05 NOTE — PLAN OF CARE
Problem: Coping Ineffective  Goal: Effective Coping  Outcome: Ongoing, Progressing  Intervention: Support and Enhance Coping Strategies  Flowsheets (Taken 11/5/2022 1703)  Complementary Therapy: music therapy provided  Supportive Measures:   active listening utilized   self-care encouraged  Family/Support System Care:   self-care encouraged   support provided  Environmental Support:   calm environment promoted   caregiver consistency promoted   comfort object encouraged   rest periods encouraged

## 2022-11-06 NOTE — ASSESSMENT & PLAN NOTE
- On maintenance chemotherapy and radiation   - Outpatient follow up with oncology   Palliative Care consult.  Episodes of confusion and tangential speech.  Unremarkable Head CT.

## 2022-11-06 NOTE — PLAN OF CARE
Problem: Fall Injury Risk  Goal: Absence of Fall and Fall-Related Injury  Outcome: Ongoing, Progressing     Problem: Adult Inpatient Plan of Care  Goal: Plan of Care Review  Outcome: Ongoing, Progressing  Goal: Patient-Specific Goal (Individualized)  Outcome: Ongoing, Progressing  Goal: Absence of Hospital-Acquired Illness or Injury  Outcome: Ongoing, Progressing  Goal: Optimal Comfort and Wellbeing  Outcome: Ongoing, Progressing  Goal: Readiness for Transition of Care  Outcome: Ongoing, Progressing     Problem: Breathing Pattern Ineffective  Goal: Effective Breathing Pattern  Outcome: Ongoing, Progressing     Problem: Coping Ineffective  Goal: Effective Coping  Outcome: Ongoing, Progressing

## 2022-11-06 NOTE — PLAN OF CARE
Problem: Fall Injury Risk  Goal: Absence of Fall and Fall-Related Injury  Outcome: Ongoing, Progressing  Intervention: Identify and Manage Contributors  Flowsheets (Taken 11/6/2022 0424)  Self-Care Promotion: safe use of adaptive equipment encouraged  Medication Review/Management: medications reviewed  Intervention: Promote Injury-Free Environment  Flowsheets (Taken 11/6/2022 0424)  Safety Promotion/Fall Prevention:   assistive device/personal item within reach   bed alarm set   side rails raised x 3   /camera at bedside   room near unit station   nonskid shoes/socks when out of bed   medications reviewed     Problem: Adult Inpatient Plan of Care  Goal: Plan of Care Review  Outcome: Ongoing, Progressing  Goal: Patient-Specific Goal (Individualized)  Outcome: Ongoing, Progressing  Goal: Absence of Hospital-Acquired Illness or Injury  Outcome: Ongoing, Progressing  Intervention: Identify and Manage Fall Risk  Flowsheets (Taken 11/6/2022 0424)  Safety Promotion/Fall Prevention:   assistive device/personal item within reach   bed alarm set   side rails raised x 3   /camera at bedside   room near unit station   nonskid shoes/socks when out of bed   medications reviewed  Intervention: Prevent Skin Injury  Flowsheets (Taken 11/6/2022 0424)  Skin Protection: adhesive use limited  Goal: Optimal Comfort and Wellbeing  Outcome: Ongoing, Progressing  Goal: Readiness for Transition of Care  Outcome: Ongoing, Progressing     Problem: Breathing Pattern Ineffective  Goal: Effective Breathing Pattern  Outcome: Ongoing, Progressing  Intervention: Promote Improved Breathing Pattern  Flowsheets (Taken 11/6/2022 0424)  Airway/Ventilation Management: airway patency maintained  Breathing Techniques/Airway Clearance: deep/controlled cough encouraged  Supportive Measures:   active listening utilized   self-care encouraged   relaxation techniques promoted  Head of Bed (HOB) Positioning: HOB elevated      Problem: Coping Ineffective  Goal: Effective Coping  Intervention: Support and Enhance Coping Strategies  Flowsheets (Taken 11/6/2022 1867)  Supportive Measures:   active listening utilized   self-care encouraged   relaxation techniques promoted  Environmental Support: calm environment promoted

## 2022-11-06 NOTE — SUBJECTIVE & OBJECTIVE
Interval History: feeling better.    Review of Systems   HENT:  Negative for ear discharge and ear pain.    Eyes:  Negative for discharge and itching.   Endocrine: Negative for cold intolerance and heat intolerance.   Neurological:  Negative for seizures and syncope.   Objective:     Vital Signs (Most Recent):  Temp: 98 °F (36.7 °C) (11/06/22 0705)  Pulse: 91 (11/06/22 1054)  Resp: 20 (11/06/22 1054)  BP: 136/79 (11/06/22 0705)  SpO2: 97 % (11/06/22 1054)   Vital Signs (24h Range):  Temp:  [97.8 °F (36.6 °C)-98.7 °F (37.1 °C)] 98 °F (36.7 °C)  Pulse:  [] 91  Resp:  [16-20] 20  SpO2:  [93 %-98 %] 97 %  BP: (119-137)/(67-81) 136/79     Weight: 88.2 kg (194 lb 7.1 oz)  Body mass index is 27.9 kg/m².    Intake/Output Summary (Last 24 hours) at 11/6/2022 1059  Last data filed at 11/6/2022 0900  Gross per 24 hour   Intake 960 ml   Output 400 ml   Net 560 ml        Physical Exam  Vitals and nursing note reviewed.   Constitutional:       General: He is not in acute distress.     Appearance: Normal appearance. He is not ill-appearing.   HENT:      Head: Normocephalic and atraumatic.      Nose: Nose normal.      Mouth/Throat:      Mouth: Mucous membranes are moist.   Eyes:      Extraocular Movements: Extraocular movements intact.   Cardiovascular:      Rate and Rhythm: Normal rate.      Pulses: Normal pulses.      Heart sounds: No murmur heard.  Pulmonary:      Effort: No respiratory distress.      Comments: Diminished air entry bilaterally   Abdominal:      General: Abdomen is flat.      Palpations: Abdomen is soft.      Tenderness: There is no abdominal tenderness.   Musculoskeletal:      Right lower leg: No edema.      Left lower leg: No edema.   Skin:     General: Skin is warm.      Capillary Refill: Capillary refill takes less than 2 seconds.   Neurological:      General: No focal deficit present.      Mental Status: He is alert.      Comments: Oriented, but episodes of confusion and tangential speech.    Psychiatric:         Mood and Affect: Mood normal.       Significant Labs: All pertinent labs within the past 24 hours have been reviewed.  BMP:   No results for input(s): GLU, NA, K, CL, CO2, BUN, CREATININE, CALCIUM, MG in the last 48 hours.    CBC:   No results for input(s): WBC, HGB, HCT, PLT in the last 48 hours.      Significant Imaging: I have reviewed all pertinent imaging results/findings within the past 24 hours.

## 2022-11-06 NOTE — ASSESSMENT & PLAN NOTE
Patient with Hypoxic Respiratory failure which is Acute on chronic.  he is on home oxygen at 2-3 LPM. Supplemental oxygen was provided and noted-  .   Signs/symptoms of respiratory failure include- increased work of breathing and respiratory distress. Contributing diagnoses includes - COPD and progression of malignancy Labs and images were reviewed. Patient Has not had a recent ABG. Will treat underlying causes and adjust management of respiratory failure as follows-   See plan for COPD  Continue O2 weaning efforts.  Possibly home tomorrow.

## 2022-11-06 NOTE — PROGRESS NOTES
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     Patient reports developing worsening SOB that started about 2 days prior to presentation. He has a chronic productive cough and lower extremity edema that have not worsened. His wife checked his SpO2 at home and was noted to be 75% on 2-3L O2, prompting him to present to the ED. He had a recent hospitalization (9/28 - 10/7) for hypoxia 2/2 COPD/HF, Afib with RVR, cellulitis and confusion. He was referred to palliative as outpatient but reported that he wants to remain full code at that time. Patient reported that he wants to be a full code at this time as well. While in the ED, the patient was hypoxic (SpO2: 88%) on 8L of supplemental O2., tachypnic and tachycardic (116), Labs showed borderline leukocytosis (11.54), hypokalemia (2.9), elevated BNP (271), elevated lactic acid (2.7). CTA of the chest showed no PE, chronic occlusion of the left mainstem bronchus, chronic consolidative changes in the left upper and lower lobes and right lower lobe and a small to moderate volume right pleural effusion. The patient was admitted for further management.       Overview/Hospital Course:  60 y/o male with lung cancer presents with acute on chronic hypoxemic respiratory failure.  Started on nebs and steroids.        Interval History: feeling better.    Review of Systems    HENT:  Negative for ear discharge and ear pain.    Eyes:  Negative for discharge and itching.   Endocrine: Negative for cold intolerance and heat intolerance.   Neurological:  Negative for seizures and syncope.   Objective:     Vital Signs (Most Recent):  Temp: 98 °F (36.7 °C) (11/06/22 0705)  Pulse: 91 (11/06/22 1054)  Resp: 20 (11/06/22 1054)  BP: 136/79 (11/06/22 0705)  SpO2: 97 % (11/06/22 1054)   Vital Signs (24h Range):  Temp:  [97.8 °F (36.6 °C)-98.7 °F (37.1 °C)] 98 °F (36.7 °C)  Pulse:  [] 91  Resp:  [16-20] 20  SpO2:  [93 %-98 %] 97 %  BP: (119-137)/(67-81) 136/79     Weight: 88.2 kg (194 lb 7.1 oz)  Body mass index is 27.9 kg/m².    Intake/Output Summary (Last 24 hours) at 11/6/2022 1059  Last data filed at 11/6/2022 0900  Gross per 24 hour   Intake 960 ml   Output 400 ml   Net 560 ml        Physical Exam  Vitals and nursing note reviewed.   Constitutional:       General: He is not in acute distress.     Appearance: Normal appearance. He is not ill-appearing.   HENT:      Head: Normocephalic and atraumatic.      Nose: Nose normal.      Mouth/Throat:      Mouth: Mucous membranes are moist.   Eyes:      Extraocular Movements: Extraocular movements intact.   Cardiovascular:      Rate and Rhythm: Normal rate.      Pulses: Normal pulses.      Heart sounds: No murmur heard.  Pulmonary:      Effort: No respiratory distress.      Comments: Diminished air entry bilaterally   Abdominal:      General: Abdomen is flat.      Palpations: Abdomen is soft.      Tenderness: There is no abdominal tenderness.   Musculoskeletal:      Right lower leg: No edema.      Left lower leg: No edema.   Skin:     General: Skin is warm.      Capillary Refill: Capillary refill takes less than 2 seconds.   Neurological:      General: No focal deficit present.      Mental Status: He is alert.      Comments: Oriented, but episodes of confusion and tangential speech.   Psychiatric:         Mood and Affect: Mood normal.        Significant Labs: All pertinent labs within the past 24 hours have been reviewed.  BMP:   No results for input(s): GLU, NA, K, CL, CO2, BUN, CREATININE, CALCIUM, MG in the last 48 hours.    CBC:   No results for input(s): WBC, HGB, HCT, PLT in the last 48 hours.      Significant Imaging: I have reviewed all pertinent imaging results/findings within the past 24 hours.      Assessment/Plan:      * Acute on chronic respiratory failure with hypoxia  Patient with Hypoxic Respiratory failure which is Acute on chronic.  he is on home oxygen at 2-3 LPM. Supplemental oxygen was provided and noted-  .   Signs/symptoms of respiratory failure include- increased work of breathing and respiratory distress. Contributing diagnoses includes - COPD and progression of malignancy Labs and images were reviewed. Patient Has not had a recent ABG. Will treat underlying causes and adjust management of respiratory failure as follows-   See plan for COPD  Continue O2 weaning efforts.  Possibly home tomorrow.    ACP (advance care planning)        Debility  PT/OT       Persistent atrial fibrillation  [id number] Score: 1.  Resume home medications         Chronic obstructive pulmonary disease with acute exacerbation  - History of COPD on supplemental O2, 2L NC at home   - Presented with worsening SOB   - Likely related to a COPD exacerbation      Plan:   - Continue steroids.  - Duo-Nebs scheduled  - Wean O2 as tolerated    Coronary artery disease involving native coronary artery of native heart without angina pectoris  Resume home medications     PAD (peripheral artery disease)  Resume home medications       Chronic diastolic heart failure  - Echocardiogram (9/30/2022): EF: 60%, mildly reduced RV systolic function, PA pressure of 40 mmHg.   - Echocardiogram (2/22/2022): EF: 35%-40%, GIDD, normal RV systolic function, mild TR   - Echocardiogram (5/18/2022): EF: 65%, indeterminate LV diastolic function, mild TR, PA pressure of 54 mmHg  -  Patient presented with worsening SOB and AMADEO   - Elevated BNP, but below baseline   - Reported compliance with his Lasix, hypokalemic on presentation   - Doubt exacerbation      Plan:   - Resume home Lasix   - Strict Is/Os    Palliative care encounter        Essential hypertension  Resume home medications       Lung cancer, main bronchus, left  - On maintenance chemotherapy and radiation   - Outpatient follow up with oncology   Palliative Care consult.  Episodes of confusion and tangential speech.  Unremarkable Head CT.      VTE Risk Mitigation (From admission, onward)         Ordered     apixaban tablet 5 mg  2 times daily         11/03/22 2031     IP VTE HIGH RISK PATIENT  Once         11/03/22 2019     Place sequential compression device  Until discontinued         11/03/22 2019                Discharge Planning   AZ: 11/7/2022     Code Status: Full Code   Is the patient medically ready for discharge?:     Reason for patient still in hospital (select all that apply): Patient trending condition  Discharge Plan A: Home with family                  Ant Martin MD  Department of Hospital Medicine   [facility] [place] - Telemetry

## 2022-11-07 NOTE — PROGRESS NOTES
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     Patient reports developing worsening SOB that started about 2 days prior to presentation. He has a chronic productive cough and lower extremity edema that have not worsened. His wife checked his SpO2 at home and was noted to be 75% on 2-3L O2, prompting him to present to the ED. He had a recent hospitalization (9/28 - 10/7) for hypoxia 2/2 COPD/HF, Afib with RVR, cellulitis and confusion. He was referred to palliative as outpatient but reported that he wants to remain full code at that time. Patient reported that he wants to be a full code at this time as well. While in the ED, the patient was hypoxic (SpO2: 88%) on 8L of supplemental O2., tachypnic and tachycardic (116), Labs showed borderline leukocytosis (11.54), hypokalemia (2.9), elevated BNP (271), elevated lactic acid (2.7). CTA of the chest showed no PE, chronic occlusion of the left mainstem bronchus, chronic consolidative changes in the left upper and lower lobes and right lower lobe and a small to moderate volume right pleural effusion. The patient was admitted for further management.       Overview/Hospital Course:  60 y/o male with lung cancer presents with acute on chronic hypoxemic respiratory failure.  Started on nebs and steroids.  Able to wean down oxygen.  Hospitalization complicated by  delirium.  Unremarkable Head CT.      Interval History: doing well with oxygen, but confused.    Review of Systems   HENT:  Negative for ear discharge and ear pain.    Eyes:  Negative for discharge and itching.   Endocrine: Negative for cold intolerance and heat intolerance.   Neurological:  Negative for seizures and syncope.   Objective:     Vital Signs (Most Recent):  Temp: 98.4 °F (36.9 °C) (11/07/22 1119)  Pulse: 89 (11/07/22 1119)  Resp: 20 (11/07/22 1119)  BP: 118/70 (11/07/22 1119)  SpO2: 98 % (11/07/22 1119)   Vital Signs (24h Range):  Temp:  [97.7 °F (36.5 °C)-98.4 °F (36.9 °C)] 98.4 °F (36.9 °C)  Pulse:  [] 89  Resp:  [18-20] 20  SpO2:  [94 %-100 %] 98 %  BP: (112-156)/(62-81) 118/70     Weight: 88.2 kg (194 lb 7.1 oz)  Body mass index is 27.9 kg/m².    Intake/Output Summary (Last 24 hours) at 11/7/2022 1159  Last data filed at 11/7/2022 0726  Gross per 24 hour   Intake 360 ml   Output 1000 ml   Net -640 ml        Physical Exam  Vitals and nursing note reviewed.   Constitutional:       General: He is not in acute distress.     Appearance: Normal appearance. He is not ill-appearing.   HENT:      Head: Normocephalic and atraumatic.      Nose: Nose normal.      Mouth/Throat:      Mouth: Mucous membranes are moist.   Eyes:      Extraocular Movements: Extraocular movements intact.   Cardiovascular:      Rate and Rhythm: Normal rate.      Pulses: Normal pulses.      Heart sounds: No murmur heard.  Pulmonary:      Effort: No respiratory distress.      Comments: Diminished air entry bilaterally   Abdominal:      General: Abdomen is flat.      Palpations: Abdomen is soft.      Tenderness: There is no abdominal tenderness.   Musculoskeletal:      Right lower leg: No edema.      Left lower leg: No edema.   Skin:     General: Skin is warm.      Capillary Refill: Capillary refill takes less than 2 seconds.   Neurological:      General: No focal deficit present.      Mental Status: He is alert.      Comments:  Oriented, but episodes of confusion and tangential speech.   Psychiatric:         Mood and Affect: Mood normal.       Significant Labs: All pertinent labs within the past 24 hours have been reviewed.  BMP:   Recent Labs   Lab 11/06/22  2254   *      K 3.8      CO2 25   BUN 34*   CREATININE 1.3   CALCIUM 8.7       CBC:   Recent Labs   Lab 11/06/22  2254   WBC 13.06*   HGB 11.5*   HCT 33.9*            Significant Imaging: I have reviewed all pertinent imaging results/findings within the past 24 hours.      Assessment/Plan:      * Acute on chronic respiratory failure with hypoxia  Patient with Hypoxic Respiratory failure which is Acute on chronic.  he is on home oxygen at 2-3 LPM. Supplemental oxygen was provided and noted- Oxygen Concentration (%):  [36] 36.   Signs/symptoms of respiratory failure include- increased work of breathing and respiratory distress. Contributing diagnoses includes - COPD and progression of malignancy Labs and images were reviewed. Patient Has not had a recent ABG. Will treat underlying causes and adjust management of respiratory failure as follows-   See plan for COPD  Doing well and now on home oxygen requirements.  Hospitalization complicated by delirium.    ACP (advance care planning)        Debility  PT/OT       Persistent atrial fibrillation  [id number] Score: 1.  Resume home medications         Chronic obstructive pulmonary disease with acute exacerbation  - History of COPD on supplemental O2, 2L NC at home   - Presented with worsening SOB   - Likely related to a COPD exacerbation      Plan:   - Continue steroids.  - Duo-Nebs scheduled  - Wean O2 as tolerated    Coronary artery disease involving native coronary artery of native heart without angina pectoris  Resume home medications     PAD (peripheral artery disease)  Resume home medications       Acute delirium  Patient with confusion and tangential speech.  Probably related to hospitalization.  Hx of lung  cancer.  Unremarkable Head CT.  Would need MRI if continued concern about brain mets.  Delirium precautions.  Will try nightly Seroquel.  Reevaluated in Am.      Chronic diastolic heart failure  - Echocardiogram (9/30/2022): EF: 60%, mildly reduced RV systolic function, PA pressure of 40 mmHg.   - Echocardiogram (2/22/2022): EF: 35%-40%, GIDD, normal RV systolic function, mild TR   - Echocardiogram (5/18/2022): EF: 65%, indeterminate LV diastolic function, mild TR, PA pressure of 54 mmHg  - Patient presented with worsening SOB and AMADEO   - Elevated BNP, but below baseline   - Reported compliance with his Lasix, hypokalemic on presentation   - Doubt exacerbation      Plan:   - Resume home Lasix   - Strict Is/Os    Palliative care encounter        Essential hypertension  Resume home medications       Lung cancer, main bronchus, left  - On maintenance chemotherapy and radiation   - Outpatient follow up with oncology   Palliative Care consult.  Episodes of confusion and tangential speech.  Unremarkable Head CT.      VTE Risk Mitigation (From admission, onward)         Ordered     apixaban tablet 5 mg  2 times daily         11/03/22 2031     IP VTE HIGH RISK PATIENT  Once         11/03/22 2019     Place sequential compression device  Until discontinued         11/03/22 2019                Discharge Planning   AZ: 11/7/2022     Code Status: Full Code   Is the patient medically ready for discharge?:     Reason for patient still in hospital (select all that apply): Patient trending condition  Discharge Plan A: Home with family                  Ant Martin MD  Department of Hospital Medicine   [facility] - Telemetry

## 2022-11-07 NOTE — PT/OT/SLP PROGRESS
Occupational Therapy   Treatment    Name: Kashif Iverson  MRN: [id number]  Admitting Diagnosis:  Acute on chronic respiratory failure with hypoxia       Recommendations:     Discharge Recommendations: home health OT (with family assist)  Discharge Equipment Recommendations:  bath bench  Barriers to discharge:       Assessment:     Kashif Iverson is a 62 y.o. male with a medical diagnosis of Acute on chronic respiratory failure with hypoxia.  He presents with the following performance deficits affecting function: weakness, impaired endurance, impaired self care skills, impaired functional mobility, gait instability, impaired balance, decreased upper extremity function, decreased lower extremity function, impaired cardiopulmonary response to activity.       Rehab Prognosis:  Good; patient would benefit from acute skilled OT services to address these deficits and reach maximum level of function.       Plan:     Patient to be seen 3 x/week to address the above listed problems via self-care/home management, therapeutic exercises, therapeutic activities  Plan of Care Expires: 11/18/22  Plan of Care Reviewed with: patient    Subjective   Pt agreeable to therapy.    Pain/Comfort:  Pain Rating 1: 0/10    Objective:     Communicated with: Nurse prior to session.  Patient found HOB elevated with bed alarm, pulse ox (continuous), telemetry upon OT entry to room.    General Precautions: Standard, fall   Orthopedic Precautions:N/A   Braces: N/A    Respiratory Status: Nasal cannula, flow 2 L/min  SPO2 on 2L at rest 88-94%  SPO2 in 2L with exertion 85%; recovered to 92% with PLB technique and seated rest.     Occupational Performance: Pt with some confusion today, but able to redirect to participate in tasks.    Bed Mobility:    Patient completed Supine to Sit with supervision     Functional Mobility/Transfers:  Patient completed Sit <> Stand Transfer with stand by assistance and contact guard assistance  with  no assistive device and  rolling walker   Patient completed Bed <> Chair Transfer using Step Transfer technique with stand by assistance and contact guard assistance with no assistive device  Functional Mobility: Pt able to ambulate within room ~12 ft with SBA/CGA, no AD, and 2L O2.    Activities of Daily Living:  Grooming: stand by assistance standing at sink to perform oral care  Upper Body Dressing: contact guard assistance to don back gown  Toileting: SBA/CGA for standing balance to place and use urinal      AMPAC 6 Click ADL: 21    Treatment & Education:  Bed mobility, functional transfer/mobility , and ADL completed as noted above.   Pt educated on safety awareness with all OOB mobility and ADL .   Encouraged increased OOB activity to maximize recovery.  Educated pt on PLB technique with exertion and for SPO2 recovery. Required verbal cues for implementation.     Patient left up in chair with all lines intact, call button in reach, nurse notified, and AVASYS present    GOALS:   Multidisciplinary Problems       Occupational Therapy Goals          Problem: Occupational Therapy    Goal Priority Disciplines Outcome Interventions   Occupational Therapy Goal     OT, PT/OT Ongoing, Progressing    Description: Goals to be met by: 11/18/22     Patient will increase functional independence with ADLs by performing:    UE Dressing with Modified [place] and Supervision.  LE Dressing with Modified [place] and Supervision.  Grooming while standing at sink with Modified [place] and Supervision.  Toileting from toilet with Modified [place], Supervision, and Assistive Devices as needed for hygiene and clothing management.   Supine to sit with Modified [place] and Supervision.  Step transfer with Modified [place] and Supervision  Toilet transfer to toilet with Modified [place] and Supervision.  Upper extremity exercise program x15 reps per handout, with assistance as needed.  Educate the patient re: Pursed Lipped  Breathing and Energy Conservation                       Time Tracking:     OT Date of Treatment: 11/07/22  OT Start Time: 0929  OT Stop Time: 0959  OT Total Time (min): 30 min    Billable Minutes:Self Care/Home Management 30    OT/PURVI: PURVI     PURVI Visit Number: 1    11/7/2022

## 2022-11-07 NOTE — PLAN OF CARE
[place] - Telemetry  Discharge Reassessment    Primary Care Provider: Guerline Higgins MD    Expected Discharge Date: 11/7/2022    CM spoke to pt's wife, Jane to discuss dc needs. Pt's wife stated  they have [facility] . CM informed pt's wife that PT/OT has recommended a hospital bed and bath bench. Pt would like to know what the co- pay. CM informed pt's wife DME will contact her before delivery.    Reassessment (most recent)       Discharge Reassessment - 11/07/22 8125          Discharge Reassessment    Assessment Type Discharge Planning Reassessment (P)      Did the patient's condition or plan change since previous assessment? Yes (P)      Discharge Plan discussed with: Spouse/sig other (P)      Name(s) and Number(s) Natty Iverson (Spouse)   [phone] (Mobile (P)      Discharge Plan A Home Health (P)      Discharge Plan B Home;Home Health (P)      DME Needed Upon Discharge  hospital bed;bath bench (P)      Discharge Barriers Identified Other (see comments) (P)    Financial issues    Why the patient remains in the hospital Requires continued medical care (P)         Post-Acute Status    Post-Acute Authorization Home Health (P)      Home Health Status Set-up Complete/Auth obtained (P)    Pt has HH with [facility]    Discharge Delays None known at this time (P)

## 2022-11-07 NOTE — ASSESSMENT & PLAN NOTE
Patient with Hypoxic Respiratory failure which is Acute on chronic.  he is on home oxygen at 2-3 LPM. Supplemental oxygen was provided and noted- Oxygen Concentration (%):  [36] 36.   Signs/symptoms of respiratory failure include- increased work of breathing and respiratory distress. Contributing diagnoses includes - COPD and progression of malignancy Labs and images were reviewed. Patient Has not had a recent ABG. Will treat underlying causes and adjust management of respiratory failure as follows-   See plan for COPD  Doing well and now on home oxygen requirements.  Hospitalization complicated by delirium.

## 2022-11-07 NOTE — PLAN OF CARE
Problem: Occupational Therapy  Goal: Occupational Therapy Goal  Description: Goals to be met by: 11/18/22     Patient will increase functional independence with ADLs by performing:    UE Dressing with Modified [place] and Supervision.  LE Dressing with Modified [place] and Supervision.  Grooming while standing at sink with Modified [place] and Supervision.  Toileting from toilet with Modified [place], Supervision, and Assistive Devices as needed for hygiene and clothing management.   Supine to sit with Modified [place] and Supervision.  Step transfer with Modified [place] and Supervision  Toilet transfer to toilet with Modified [place] and Supervision.  Upper extremity exercise program x15 reps per handout, with assistance as needed.  Educate the patient re: Pursed Lipped Breathing and Energy Conservation  Outcome: Ongoing, Progressing

## 2022-11-07 NOTE — ASSESSMENT & PLAN NOTE
Patient with confusion and tangential speech.  Probably related to hospitalization.  Hx of lung cancer.  Unremarkable Head CT.  Would need MRI if continued concern about brain mets.  Delirium precautions.  Will try nightly Seroquel.  Reevaluated in Am.

## 2022-11-07 NOTE — SUBJECTIVE & OBJECTIVE
Interval History: doing well with oxygen, but confused.    Review of Systems   HENT:  Negative for ear discharge and ear pain.    Eyes:  Negative for discharge and itching.   Endocrine: Negative for cold intolerance and heat intolerance.   Neurological:  Negative for seizures and syncope.   Objective:     Vital Signs (Most Recent):  Temp: 98.4 °F (36.9 °C) (11/07/22 1119)  Pulse: 89 (11/07/22 1119)  Resp: 20 (11/07/22 1119)  BP: 118/70 (11/07/22 1119)  SpO2: 98 % (11/07/22 1119)   Vital Signs (24h Range):  Temp:  [97.7 °F (36.5 °C)-98.4 °F (36.9 °C)] 98.4 °F (36.9 °C)  Pulse:  [] 89  Resp:  [18-20] 20  SpO2:  [94 %-100 %] 98 %  BP: (112-156)/(62-81) 118/70     Weight: 88.2 kg (194 lb 7.1 oz)  Body mass index is 27.9 kg/m².    Intake/Output Summary (Last 24 hours) at 11/7/2022 1159  Last data filed at 11/7/2022 0726  Gross per 24 hour   Intake 360 ml   Output 1000 ml   Net -640 ml        Physical Exam  Vitals and nursing note reviewed.   Constitutional:       General: He is not in acute distress.     Appearance: Normal appearance. He is not ill-appearing.   HENT:      Head: Normocephalic and atraumatic.      Nose: Nose normal.      Mouth/Throat:      Mouth: Mucous membranes are moist.   Eyes:      Extraocular Movements: Extraocular movements intact.   Cardiovascular:      Rate and Rhythm: Normal rate.      Pulses: Normal pulses.      Heart sounds: No murmur heard.  Pulmonary:      Effort: No respiratory distress.      Comments: Diminished air entry bilaterally   Abdominal:      General: Abdomen is flat.      Palpations: Abdomen is soft.      Tenderness: There is no abdominal tenderness.   Musculoskeletal:      Right lower leg: No edema.      Left lower leg: No edema.   Skin:     General: Skin is warm.      Capillary Refill: Capillary refill takes less than 2 seconds.   Neurological:      General: No focal deficit present.      Mental Status: He is alert.      Comments: Oriented, but episodes of confusion and  tangential speech.   Psychiatric:         Mood and Affect: Mood normal.       Significant Labs: All pertinent labs within the past 24 hours have been reviewed.  BMP:   Recent Labs   Lab 11/06/22  2254   *      K 3.8      CO2 25   BUN 34*   CREATININE 1.3   CALCIUM 8.7       CBC:   Recent Labs   Lab 11/06/22 2254   WBC 13.06*   HGB 11.5*   HCT 33.9*            Significant Imaging: I have reviewed all pertinent imaging results/findings within the past 24 hours.

## 2022-11-07 NOTE — PLAN OF CARE
Problem: Fall Injury Risk  Goal: Absence of Fall and Fall-Related Injury  Outcome: Ongoing, Progressing     Problem: Adult Inpatient Plan of Care  Goal: Plan of Care Review  Outcome: Ongoing, Progressing  Goal: Patient-Specific Goal (Individualized)  Outcome: Ongoing, Progressing  Goal: Absence of Hospital-Acquired Illness or Injury  Outcome: Ongoing, Progressing  Intervention: Prevent Skin Injury  Flowsheets (Taken 11/7/2022 0641)  Body Position:   position maintained   position changed independently  Skin Protection: incontinence pads utilized  Goal: Optimal Comfort and Wellbeing  Outcome: Ongoing, Progressing  Goal: Readiness for Transition of Care  Outcome: Ongoing, Progressing     Problem: Breathing Pattern Ineffective  Goal: Effective Breathing Pattern  Outcome: Ongoing, Progressing  Intervention: Promote Improved Breathing Pattern  Flowsheets (Taken 11/7/2022 0641)  Airway/Ventilation Management: airway patency maintained  Supportive Measures: self-care encouraged  Head of Bed (HOB) Positioning: HOB elevated

## 2022-11-07 NOTE — TELEPHONE ENCOUNTER
Writer went to see patient today who was resting comfortably in NAD in chair.  Pt states he is feeling better and working with physical therapy.  He is now weaned down to 2L O2.  Pt was also eating and denied any issues apart from SOB on exertion. Will work with office staff to reschedule apt.        Guerline Higgins MD

## 2022-11-07 NOTE — PLAN OF CARE
Problem: Physical Therapy  Goal: Physical Therapy Goal  Description: Goals to be met by: 22     Patient will increase functional independence with mobility by performin. Supine to sit with Modified [place]  2. Sit to stand transfer with Modified [place]  3. Gait  x 25-50 feet with Modified [place]    4. Lower extremity exercise program x10 reps per handout, with supervision    Outcome: Ongoing, Progressing   Pt is [facility] post treatment, nurse notified. SpO2 89-94% at rest and SpO2 dropped to 86% after activity, v/c for pursed lip breathing tech and rest breaks SpO2 went up to 94% on 2 L/min Oxygen NC.

## 2022-11-07 NOTE — PT/OT/SLP PROGRESS
"Physical Therapy Treatment    Patient Name:  Kashif Iverson   MRN:  [id number]    Recommendations:     Discharge Recommendations:  home health PT (Family support)   Discharge Equipment Recommendations: hospital bed, bath bench   Barriers to discharge:  None from PT standpoint, Increased O2 demand    Assessment:     Kashif Iverson is a 62 y.o. male admitted with a medical diagnosis of Acute on chronic respiratory failure with hypoxia.  He presents with the following impairments/functional limitations:  weakness, impaired endurance, impaired functional mobility, gait instability, impaired balance, decreased coordination, decreased lower extremity function, decreased safety awareness, impaired cardiopulmonary response to activity, decreased upper extremity function.    Pt is [facility] post treatment, nurse notified. SpO2 89-94% at rest and SpO2 dropped to 86% after activity, v/c for pursed lip breathing tech and rest breaks SpO2 went up to 94% on 2 L/min Oxygen NC.    Rehab Prognosis: Good; patient would benefit from acute skilled PT services to address these deficits and reach maximum level of function.    Recent Surgery: * No surgery found *      Plan:     During this hospitalization, patient to be seen  (2-3x/wk) to address the identified rehab impairments via gait training, therapeutic activities, therapeutic exercises and progress toward the following goals:    Plan of Care Expires:  11/18/22    Subjective     Chief Complaint: "I need to use restroom."  Patient/Family Comments/goals: Pt agreed to participate.  Pain/Comfort:  Pain Rating 1: 0/10  Pain Rating Post-Intervention 1: 0/10      Objective:     Communicated with nurse Pizano prior to session.  Patient found up in chair with telemetry, pulse ox (continuous) upon PT entry to room.     General Precautions: Standard, fall   Orthopedic Precautions:N/A   Braces: N/A  Respiratory Status: Nasal cannula, flow 2 L/min     Functional Mobility:  Transfers:   Gait Belt don prior " transfer and ambulation SpO2 86-94% on 2L/min Oxygen NC, seated rest breaks and pursed lip breathing tech to recovered SpO2 level  Sit to Stand: 2 trials from BS Chair with stand by assistance with no AD  Toilet Transfer: SBA/CGA with no AD using Step Transfer  Gait: Patient ambulated ~12, ~50  feet on level tile with no AD with Stand-by Assistance and Contact Guard Assistance. Pt with demonstrating a reciprocal gait with decreased juan, decreased velocity of limb motion, and decreased stride length. Impairments contributing to gait deviations include impaired balance and decreased strength; pt with visibly present SOB, v/c required for pursed lip breathing tech and rest breaks  Balance: Fair/Fair+ Sitting; Fair Standing      AM-PAC 6 CLICK MOBILITY  Turning over in bed (including adjusting bedclothes, sheets and blankets)?: 3  Sitting down on and standing up from a chair with arms (e.g., wheelchair, bedside commode, etc.): 3  Moving from lying on back to sitting on the side of the bed?: 3  Moving to and from a bed to a chair (including a wheelchair)?: 3  Need to walk in hospital room?: 3  Climbing 3-5 steps with a railing?: 3  Basic Mobility Total Score: 18       Treatment & Education:  Educated pt on benefits of OOB activity and performing BLE ex throughout the day to maintain muscle strength/endurance and blood circulation, pt verbalized understanding.  BLE ex in seated in BS Chair 10 reps AP, LAQ, Marching, PS  SpO2 87-93%    Patient left up in chair with tray table + lunch tray in front, all lines intact (Oxygen), call button in reach, and nurse notified.    GOALS:   Multidisciplinary Problems       Physical Therapy Goals          Problem: Physical Therapy    Goal Priority Disciplines Outcome Goal Variances Interventions   Physical Therapy Goal     PT, PT/OT Ongoing, Progressing     Description: Goals to be met by: 22     Patient will increase functional independence with mobility by performin.  Supine to sit with Modified [place]  2. Sit to stand transfer with Modified [place]  3. Gait  x 25-50 feet with Modified [place]    4. Lower extremity exercise program x10 reps per handout, with supervision                         Time Tracking:     PT Received On: 11/07/22  PT Start Time: 1147     PT Stop Time: 1215  PT Total Time (min): 28 min     Billable Minutes: Therapeutic Activity 18 min and Therapeutic Exercise 10 min    Treatment Type: Treatment  PT/PTA: PTA     PTA Visit Number: 1     11/07/2022

## 2022-11-07 NOTE — PLAN OF CARE
Problem: Fall Injury Risk  Goal: Absence of Fall and Fall-Related Injury  Outcome: Ongoing, Progressing     Problem: Adult Inpatient Plan of Care  Goal: Plan of Care Review  Outcome: Ongoing, Progressing  Goal: Patient-Specific Goal (Individualized)  Outcome: Ongoing, Progressing  Goal: Absence of Hospital-Acquired Illness or Injury  Outcome: Ongoing, Progressing  Goal: Optimal Comfort and Wellbeing  Outcome: Ongoing, Progressing  Goal: Readiness for Transition of Care  Outcome: Ongoing, Progressing     Problem: Infection  Goal: Absence of Infection Signs and Symptoms  Outcome: Ongoing, Progressing     Problem: Breathing Pattern Ineffective  Goal: Effective Breathing Pattern  Outcome: Ongoing, Progressing     Problem: Coping Ineffective  Goal: Effective Coping  Outcome: Ongoing, Progressing

## 2022-11-07 NOTE — PLAN OF CARE
11/07/22 1513   Medicare Message   Important Message from Medicare regarding Discharge Appeal Rights Explained to patient/caregiver  (SW explained IMM. Pt's wife verbally expressed understanding. SW will mail a copy to address on file.)   Date IMM was signed 11/07/22   Time IMM was signed 1513     [id number] [phone]

## 2022-11-08 NOTE — PLAN OF CARE
Problem: Fall Injury Risk  Goal: Absence of Fall and Fall-Related Injury  Intervention: Identify and Manage Contributors  Flowsheets (Taken 11/8/2022 0436)  Self-Care Promotion: independence encouraged  Medication Review/Management: medications reviewed  Intervention: Promote Injury-Free Environment  Flowsheets (Taken 11/8/2022 0436)  Safety Promotion/Fall Prevention:   bed alarm set   Fall Risk reviewed with patient/family     Problem: Adult Inpatient Plan of Care  Goal: Plan of Care Review  Flowsheets (Taken 11/8/2022 0436)  Plan of Care Reviewed With: patient  Goal: Absence of Hospital-Acquired Illness or Injury  Intervention: Identify and Manage Fall Risk  Flowsheets (Taken 11/8/2022 0436)  Safety Promotion/Fall Prevention:   bed alarm set   Fall Risk reviewed with patient/family  Intervention: Prevent and Manage VTE (Venous Thromboembolism) Risk  Flowsheets (Taken 11/8/2022 0436)  VTE Prevention/Management: ROM (active) performed  Range of Motion: active ROM (range of motion) encouraged  Goal: Optimal Comfort and Wellbeing  Intervention: Monitor Pain and Promote Comfort  Flowsheets (Taken 11/8/2022 0436)  Pain Management Interventions: pain management plan reviewed with patient/caregiver  Intervention: Provide Person-Centered Care  Flowsheets (Taken 11/8/2022 0436)  Trust Relationship/Rapport:   care explained   thoughts/feelings acknowledged     Problem: Breathing Pattern Ineffective  Goal: Effective Breathing Pattern  Intervention: Promote Improved Breathing Pattern  Flowsheets (Taken 11/8/2022 0436)  Airway/Ventilation Management: calming measures promoted  Head of Bed (HOB) Positioning: HOB at 30-45 degrees

## 2022-11-08 NOTE — PT/OT/SLP PROGRESS
Physical Therapy      Patient Name:  Kashif Iverson   MRN:  [id number]    2:58 pm Patient not seen today secondary to pt was working with Respiratory Therapist. Will follow-up as able.

## 2022-11-09 NOTE — PLAN OF CARE
Problem: Physical Therapy  Goal: Physical Therapy Goal  Description: Goals to be met by: 22     Patient will increase functional independence with mobility by performin. Supine to sit with Modified [place]  2. Sit to stand transfer with Modified [place]  3. Gait  x 25-50 feet with Modified [place]    4. Lower extremity exercise program x10 reps per handout, with supervision    Outcome: Ongoing, Progressing

## 2022-11-09 NOTE — SUBJECTIVE & OBJECTIVE
Interval History: No acute events overnight.  He remains calm and breathing comfortably on home level of O2.  Delirium persists and is a bit worse actually.  Discussed with wife on phone and all questions answered.    Review of Systems   HENT:  Negative for ear discharge and ear pain.    Eyes:  Negative for discharge and itching.   Endocrine: Negative for cold intolerance and heat intolerance.   Neurological:  Positive for weakness. Negative for seizures and syncope.   Psychiatric/Behavioral:  Positive for confusion.    Objective:     Vital Signs (Most Recent):  Temp: 98.4 °F (36.9 °C) (11/09/22 1134)  Pulse: 102 (11/09/22 1256)  Resp: 20 (11/09/22 1256)  BP: 129/73 (11/09/22 1134)  SpO2: (!) 84 % (11/09/22 1256)   Vital Signs (24h Range):  Temp:  [97.7 °F (36.5 °C)-99.2 °F (37.3 °C)] 98.4 °F (36.9 °C)  Pulse:  [] 102  Resp:  [18-20] 20  SpO2:  [84 %-99 %] 84 %  BP: (117-142)/(58-84) 129/73     Weight: 87.8 kg (193 lb 9 oz)  Body mass index is 27.77 kg/m².    Intake/Output Summary (Last 24 hours) at 11/9/2022 1441  Last data filed at 11/9/2022 0440  Gross per 24 hour   Intake 240 ml   Output --   Net 240 ml        Physical Exam  Vitals and nursing note reviewed.   Constitutional:       General: He is not in acute distress.     Appearance: Normal appearance. He is not ill-appearing, toxic-appearing or diaphoretic.   HENT:      Head: Normocephalic and atraumatic.      Right Ear: External ear normal.      Left Ear: External ear normal.      Nose: Nose normal.      Mouth/Throat:      Mouth: Mucous membranes are moist.   Eyes:      Extraocular Movements: Extraocular movements intact.      Conjunctiva/sclera: Conjunctivae normal.   Cardiovascular:      Rate and Rhythm: Tachycardia present.      Pulses: Normal pulses.      Heart sounds: No murmur heard.  Pulmonary:      Effort: No respiratory distress.      Comments: Good air movement - no wheezing  Abdominal:      General: Abdomen is flat.      Palpations: Abdomen  "is soft.      Tenderness: There is no abdominal tenderness.   Musculoskeletal:      Cervical back: Normal range of motion. No rigidity.      Right lower leg: No edema.      Left lower leg: No edema.   Skin:     General: Skin is warm.      Capillary Refill: Capillary refill takes less than 2 seconds.   Neurological:      General: No focal deficit present.      Mental Status: He is alert.      Comments: Oriented to self, wife's name, home address but not president, year, hospital or city.  [place]  Wife states this is new for him.  Diffuse weakness - non-focal exam   Psychiatric:         Mood and Affect: Mood normal.       Significant Labs: All pertinent labs within the past 24 hours have been reviewed.    Significant Imaging: I have reviewed all pertinent imaging results/findings within the past 24 hours.  "

## 2022-11-09 NOTE — PROGRESS NOTES
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     Patient reports developing worsening SOB that started about 2 days prior to presentation. He has a chronic productive cough and lower extremity edema that have not worsened. His wife checked his SpO2 at home and was noted to be 75% on 2-3L O2, prompting him to present to the ED. He had a recent hospitalization (9/28 - 10/7) for hypoxia 2/2 COPD/HF, Afib with RVR, cellulitis and confusion. He was referred to palliative as outpatient but reported that he wants to remain full code at that time. Patient reported that he wants to be a full code at this time as well. While in the ED, the patient was hypoxic (SpO2: 88%) on 8L of supplemental O2., tachypnic and tachycardic (116), Labs showed borderline leukocytosis (11.54), hypokalemia (2.9), elevated BNP (271), elevated lactic acid (2.7). CTA of the chest showed no PE, chronic occlusion of the left mainstem bronchus, chronic consolidative changes in the left upper and lower lobes and right lower lobe and a small to moderate volume right pleural effusion. The patient was admitted for further management.       Overview/Hospital Course:  60 y/o male with lung cancer presents with acute on chronic hypoxemic respiratory failure.  Started on nebs and steroids.  Able to wean down oxygen.  Hospitalization complicated by  "delirium.  Unremarkable Head CT.      Interval History: No acute events overnight.  Breathing comfortably on home level of O2.  Delirium is improving but still thinks he is at work, "[place]".  Discussed with wife on phone and all questions answered.    Review of Systems   HENT:  Negative for ear discharge and ear pain.    Eyes:  Negative for discharge and itching.   Endocrine: Negative for cold intolerance and heat intolerance.   Neurological:  Positive for weakness. Negative for seizures and syncope.   Psychiatric/Behavioral:  Positive for confusion.    Objective:     Vital Signs (Most Recent):  Temp: 98.4 °F (36.9 °C) (11/08/22 1714)  Pulse: 96 (11/08/22 1714)  Resp: 18 (11/08/22 1714)  BP: 126/69 (11/08/22 1714)  SpO2: 96 % (11/08/22 1714)   Vital Signs (24h Range):  Temp:  [97.8 °F (36.6 °C)-98.9 °F (37.2 °C)] 98.4 °F (36.9 °C)  Pulse:  [] 96  Resp:  [18-20] 18  SpO2:  [94 %-99 %] 96 %  BP: (118-158)/(59-73) 126/69     Weight: 89.5 kg (197 lb 5 oz)  Body mass index is 28.31 kg/m².    Intake/Output Summary (Last 24 hours) at 11/8/2022 1802  Last data filed at 11/8/2022 1211  Gross per 24 hour   Intake 600 ml   Output 1450 ml   Net -850 ml        Physical Exam  Vitals and nursing note reviewed.   Constitutional:       General: He is not in acute distress.     Appearance: Normal appearance. He is not ill-appearing, toxic-appearing or diaphoretic.   HENT:      Head: Normocephalic and atraumatic.      Right Ear: External ear normal.      Left Ear: External ear normal.      Nose: Nose normal.      Mouth/Throat:      Mouth: Mucous membranes are moist.   Eyes:      Extraocular Movements: Extraocular movements intact.      Conjunctiva/sclera: Conjunctivae normal.   Cardiovascular:      Rate and Rhythm: Tachycardia present.      Pulses: Normal pulses.      Heart sounds: No murmur heard.  Pulmonary:      Effort: No respiratory distress.      Comments: Good air movement - no wheezing  Abdominal:      " General: Abdomen is flat.      Palpations: Abdomen is soft.      Tenderness: There is no abdominal tenderness.   Musculoskeletal:      Cervical back: Normal range of motion. No rigidity.      Right lower leg: No edema.      Left lower leg: No edema.   Skin:     General: Skin is warm.      Capillary Refill: Capillary refill takes less than 2 seconds.   Neurological:      General: No focal deficit present.      Mental Status: He is alert.      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       Significant Labs: All pertinent labs within the past 24 hours have been reviewed.    Significant Imaging: I have reviewed all pertinent imaging results/findings within the past 24 hours.      Assessment/Plan:      * Acute on chronic respiratory failure with hypoxia  [facility]  -Patient with Hypoxic Respiratory failure which is Acute on chronic.  he is on home oxygen at 2-3 LPM. -Supplemental oxygen was provided and noted-  .   -Signs/symptoms of respiratory failure include- increased work of breathing and respiratory distress.   -Contributing diagnoses includes - COPD and progression of malignancy   -Labs and images were reviewed. Patient Has not had a recent ABG.   -Will treat underlying causes and adjust management of respiratory failure as follows-   -Likely related to a COPD exacerbation   -Exacerbation has been treated with steroids and scheduled duo-nebs.  Wheezing has resolved and he is on his home level of O2  -Complete course of prednisone x 5 days.  Will change to scheduled nebulizers q6h while awake to allow him to sleep.   -Likely resume trelegy tomorrow.  -Home tomorrow if delirium continues to improve    Chronic obstructive pulmonary disease with acute exacerbation  -Treatment as above.    Acute delirium  -Noted significant delirium during his stay  -Head CT without any  acute findings  -Suspect secondary to steroids and hospitalization related delirium.  -Search for other reversible sources of encephalopathy unrevealing so far:  Ammonia, TSH are normal.  Await RPR, folate and b12.  -No evidence of infection at this time.  -Continue delirium precautions  -Will decrease dose of seroquel at night    ACP (advance care planning)  -Patient is full code    Debility  -PT/OT consulted and recommend HH at discharge  -hospital bed ordered for at home 11/8.    Persistent atrial fibrillation  -Presently appears sinus  -Continue metoprolol and eliquis      Coronary artery disease involving native coronary artery of native heart without angina pectoris  -Denies chest pain  -Continue aspirin, plavix and statin.    PAD (peripheral artery disease)  -Continue aspirin, plavix and statin    Chronic diastolic heart failure  -Echocardiogram (2/22/2022): EF: 35%-40%, GIDD, normal RV systolic function, mild TR   -Echocardiogram (5/18/2022): EF: 65%, indeterminate LV diastolic function, mild TR, PA pressure of 54 mmHg  -Echocardiogram (9/30/2022): EF: 60%, mildly reduced RV systolic function, PA pressure of 40 mmHg.   -Patient presented with worsening SOB and AMADEO   -Elevated BNP, but below baseline   -Reported compliance with his Lasix, hypokalemic on presentation   -Doubt exacerbation    -Continue strict ins/outs and home lasix    Essential hypertension  -Well controlled  -Continue norvasc and metoprolol    Lung cancer, main bronchus, left  -On maintenance chemotherapy and radiation   -Outpatient follow up with oncology   -Palliative Care consult.      VTE Risk Mitigation (From admission, onward)         Ordered     apixaban tablet 5 mg  2 times daily         11/03/22 2031     IP VTE HIGH RISK PATIENT  Once         11/03/22 2019     Place sequential compression device  Until discontinued         11/03/22 2019                Discharge Planning   AZ: 11/9/2022     Code Status: Full Code   Is the patient  medically ready for discharge?:     Reason for patient still in hospital (select all that apply): Treatment  Discharge Plan A: Home Health   Discharge Delays: None known at this time              Raphael Hathaway MD  Department of Hospital Medicine   [facility]

## 2022-11-09 NOTE — ASSESSMENT & PLAN NOTE
-Echocardiogram (2/22/2022): EF: 35%-40%, GIDD, normal RV systolic function, mild TR   -Echocardiogram (5/18/2022): EF: 65%, indeterminate LV diastolic function, mild TR, PA pressure of 54 mmHg  -Echocardiogram (9/30/2022): EF: 60%, mildly reduced RV systolic function, PA pressure of 40 mmHg.   -Patient presented with worsening SOB and AMADEO   -Elevated BNP, but below baseline   -Reported compliance with his Lasix, hypokalemic on presentation   -Doubt exacerbation    -Continue strict ins/outs and home lasix

## 2022-11-09 NOTE — DISCHARGE INSTRUCTIONS
Take all medications as prescribed.  Take xarelto 15 mg by mouth twice daily x 21 days, then change and take 20mg by mouth once daily with dinner as maintenance dose.  Eat a strict low salt cardiac diet.  Follow up with your physicians as scheduled - pcp within 1 week and pulmonologist within 2 weeks (referral placed) and hematology oncology physician within 2 weeks.  We have ordered home health for physical and occupational therapy.  Thank you for trusting [facility] and Dr. Hathaway with your care.  We are honored that you entrusted us with your healthcare needs. Your satisfaction is very important to us and we hope you have been very pleased with your experience at [facility]. After your discharge you may receive a survey asking you to rate your hospital experience. We encourage you to take the time to complete the survey as your feedback allows us to identify areas for improvement as well as recognize our staff.   We hope that you have received the very best care possible during your hospitalization at [facility], as your satisfaction is our top priority.

## 2022-11-09 NOTE — PT/OT/SLP PROGRESS
Physical Therapy Treatment    Patient Name:  Kashif Iverson   MRN:  [id number]    Recommendations:     Discharge Recommendations:  home health PT (Family support)   Discharge Equipment Recommendations: hospital bed, bath bench     Assessment:     Kashif Iverson is a 62 y.o. male admitted with a medical diagnosis of Acute on chronic respiratory failure with hypoxia.  He presents with the following impairments/functional limitations:  weakness, impaired endurance, impaired functional mobility, gait instability, impaired balance, decreased coordination, decreased upper extremity function, decreased lower extremity function, impaired cardiopulmonary response to activity, decreased safety awareness, impaired skin, edema.        Rehab Prognosis: Good; patient would benefit from acute skilled PT services to address these deficits and reach maximum level of function.    Recent Surgery: * No surgery found *      Plan:     During this hospitalization, patient to be seen  (2-3x/wk) to address the identified rehab impairments via gait training, therapeutic activities, therapeutic exercises and progress toward the following goals:    Plan of Care Expires:  11/18/22    Subjective     Chief Complaint: n/a  Patient/Family Comments/goals: Pt agreed to participate.  Pain/Comfort:  Pain Rating 1: 0/10  Pain Rating Post-Intervention 1: 0/10      Objective:     Communicated with nurse April prior to session.  Patient found HOB elevated with telemetry, peripheral IV, oxygen, pulse ox (continuous) upon PT entry to room.     General Precautions: Standard, fall   Orthopedic Precautions:N/A   Braces: N/A  Respiratory Status: Nasal cannula, flow 2 L/min     Functional Mobility:  Bed Mobility:     Supine to Sit: supervision   Sit to Supine: supervision  Transfers:     Sit to Stand: from EOB with stand by assistance with no AD  Gait: Patient ambulated ~12 feet x 2 trials on level tile with no AD with Stand-by Assistance and Contact Guard Assistance on  2 L/min Oxygen NC. Pt with demonstrating a reciprocal gait with decreased juan, decreased velocity of limb motion, and decreased stride length. Impairments contributing to gait deviations include impaired balance and decreased strength; v/c for upright posture and PLB tech  Balance: Fair/Fair+ Sitting; Fair Standing      AM-PAC 6 CLICK MOBILITY  Turning over in bed (including adjusting bedclothes, sheets and blankets)?: 4  Sitting down on and standing up from a chair with arms (e.g., wheelchair, bedside commode, etc.): 3  Moving from lying on back to sitting on the side of the bed?: 4  Moving to and from a bed to a chair (including a wheelchair)?: 3  Need to walk in hospital room?: 3  Climbing 3-5 steps with a railing?: 3  Basic Mobility Total Score: 20       Treatment & Education:  Re-educated pt on benefits of OOB activity and performing BLE ex throughout the day to maintain muscle strength/endurance and blood circulation, pt verbalized understanding.  BLE ex in seated in BS Chair 10 reps AP, LAQ, Marching,     Patient left HOB elevated with tray table at bedside, all lines intact, call button in reach, bed alarm on, and nurse notified.    GOALS:   Multidisciplinary Problems       Physical Therapy Goals          Problem: Physical Therapy    Goal Priority Disciplines Outcome Goal Variances Interventions   Physical Therapy Goal     PT, PT/OT Ongoing, Progressing     Description: Goals to be met by: 22     Patient will increase functional independence with mobility by performin. Supine to sit with Modified [place]  2. Sit to stand transfer with Modified [place]  3. Gait  x 25-50 feet with Modified [place]    4. Lower extremity exercise program x10 reps per handout, with supervision                         Time Tracking:     PT Received On: 22  PT Start Time: 1633     PT Stop Time: 1653  PT Total Time (min): 20 min     Billable Minutes: Therapeutic Activity 20 min (Co-treat with  OT)    Treatment Type: Treatment  PT/PTA: PTA     PTA Visit Number: 2     11/09/2022

## 2022-11-09 NOTE — ASSESSMENT & PLAN NOTE
-Noted significant delirium during his stay  -Head CT without any acute findings  -Suspect secondary to steroids and hospitalization related delirium.  -Search for other reversible sources of encephalopathy unrevealing so far:  UA, Ammonia, B12 and TSH are normal.  Await RPR and folate.  -No evidence of infection at this time.  Leukocytosis most likely due to steroids from prednisone  -Delirium a bit worse today.  Will check MRI for possible stroke vs brain mets and consult neurology.  -Continue delirium precautions  -Continue low dose of seroquel at night  -Stop other sedating meds - last received pain meds 11/5.

## 2022-11-09 NOTE — PT/OT/SLP PROGRESS
Occupational Therapy   Treatment    Name: Kashif Iverson  MRN: [id number]  Admitting Diagnosis:  Acute on chronic respiratory failure with hypoxia       Recommendations:     Discharge Recommendations: home health OT (with family assist)  Discharge Equipment Recommendations:  bath bench  Barriers to discharge:  None    Assessment:     Kashif Iverson is a 62 y.o. male with a medical diagnosis of Acute on chronic respiratory failure with hypoxia.  Performance deficits affecting function are weakness, impaired endurance, impaired self care skills, impaired functional mobility, gait instability, impaired balance, decreased safety awareness, impaired cardiopulmonary response to activity, impaired skin.   The patient was alert,oriented to self and year but not place or date. The patient was able to amb without AD with SBA/CGA to/from the sink.  No SOB or LOB noted.     Rehab Prognosis:  Good; patient would benefit from acute skilled OT services to address these deficits and reach maximum level of function.       Plan:     Patient to be seen 3 x/week to address the above listed problems via self-care/home management, therapeutic activities, therapeutic exercises  Plan of Care Expires: 11/18/22  Plan of Care Reviewed with: patient    Subjective     Pain/Comfort:  Pain Rating 1: 0/10    Objective:     Communicated with: nurse, April prior to session.  Patient found HOB elevated with telemetry, peripheral IV, oxygen, pulse ox (continuous) ([employer] monitor) upon OT entry to room.    General Precautions: Standard, fall   Orthopedic Precautions:N/A   Braces: N/A  Respiratory Status: Nasal cannula, flow 2 L/min     Occupational Performance:     Bed Mobility:    Patient completed Scooting/Bridging with stand by assistance  Patient completed Supine to Sit with stand by assistance and HOB elevated  Patient completed Sit to Supine with stand by assistance     Functional Mobility/Transfers:  Patient completed Sit <> Stand Transfer with stand by  assistance and contact guard assistance  with  no assistive device   Functional Mobility: The patient amb to/from the sink to perform grooming task with SBA and 2L O2 NC. The patient was able to stand at the sink to wash his hands and face and brush his teeth with SOB or LOB.    Activities of Daily Living:  Grooming: stand by assistance to stand at the sink to wash his hands and face and brush his teeth. The patient hit his right index PIP on the faucet while reaching to turn off the water. The patient had a small cut with bleeding. Bleeding stopped with pressure applied. Area was cleaned and bandage applied. Nurse, April notified.  Upper Body Dressing: contact guard assistance    Lower Body Dressing: stand by assistance to adjust socks      [facility] 6 Click ADL: 22    Treatment & Education:  Participated in self care and functional mobility as noted above    Patient left HOB elevated with all lines intact, call button in reach, bed alarm on, and nurse, April notified, [employer] present    GOALS:   Multidisciplinary Problems       Occupational Therapy Goals          Problem: Occupational Therapy    Goal Priority Disciplines Outcome Interventions   Occupational Therapy Goal     OT, PT/OT Ongoing, Progressing    Description: Goals to be met by: 11/18/22     Patient will increase functional independence with ADLs by performing:    UE Dressing with Modified [place] and Supervision.  LE Dressing with Modified [place] and Supervision.  Grooming while standing at sink with Modified [place] and Supervision.  Toileting from toilet with Modified [place], Supervision, and Assistive Devices as needed for hygiene and clothing management.   Supine to sit with Modified [place] and Supervision.  Step transfer with Modified [place] and Supervision  Toilet transfer to toilet with Modified [place] and Supervision.  Upper extremity exercise program x15 reps per handout, with assistance as needed.  Educate  the patient re: Pursed Lipped Breathing and Energy Conservation                       Time Tracking:     OT Date of Treatment: 11/09/22  OT Start Time: 1632  OT Stop Time: 1652  OT Total Time (min): 20 min    Billable Minutes:Self Care/Home Management 20 (with PT)    OT/PURVI: OT     PURVI Visit Number: 1    11/9/2022

## 2022-11-09 NOTE — PLAN OF CARE
"[facility] Telemetry      HOME HEALTH ORDERS  FACE TO FACE ENCOUNTER    Patient Name: Kashif Iverson  YOB: 1960    PCP: Guerline Higgins MD   PCP Address: [address] / [place]  PCP Phone Number: [phone]  PCP Fax: [phone]    Encounter Date: 11/3/22    Admit to Home Health    Diagnoses:  Active Hospital Problems    Diagnosis  POA    *Acute on chronic respiratory failure with hypoxia [J96.21]  Yes     Priority: 1 - High    Chronic obstructive pulmonary disease with acute exacerbation [J44.1]  Yes     Priority: 2     Acute delirium [R41.0]  No     Priority: 3     ACP (advance care planning) [Z71.89]  Not Applicable    Debility [R53.81]  Yes    Persistent atrial fibrillation [I48.19]  Yes    Coronary artery disease involving native coronary artery of native heart without angina pectoris [I25.10]  Yes     Chronic    PAD (peripheral artery disease) [I73.9]  Yes     LUIS FELIPE 3/11/22      Chronic diastolic heart failure [I50.32]  Yes    Essential hypertension [I10]  Yes     Chronic    Lung cancer, main bronchus, left [C34.02]  Yes     Chronic     8/30/2019 underwent bronchoscopy that showed "A partially obstructing (about 80% obstructed) mass was found in the middle portion in the left mainstem bronchus. The mass was large and bloody, circumferential, endobronchial, friable and necrotic. 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  No actionable mutations and PD-L1 5%.  9/19/21 staging PET CT showed "Hypermetabolic left lung mass concerning for primary pulmonary malignancy with metastatic disease involving the right 6th and 9th ribs as well as mediastinal and left supraclavicular lymph nodes."  Stage IV squamous cell carcinoma of the lung at diagnosis.    10/8/2019-10/21/2019 he received palliative chemoradiation for rib pain with carboplatin and paclitaxel.  He received 3 cycles of carboplatin and paclitaxel. "  He developed anaphylactic reaction to paclitaxel.  The chest was treated with AP:PA fields and the right 9th rib was treated with anterior and posterior oblique fields at 300 cGy per fraction to 3000 cGy.   Lt chest 6X 300 10 / 10 3,000   Rt 9th rib 6X 300 10 / 10 3,000     10/31/2019-9/10/2020 he received pembrolizumab (completed 15 cycles, last dose 8/21/2020)  9/10/2020 PET CT showed progression of disease.  He was then referred to Dr. Key for evaluation for clinical trials.  10/20/2020 he underwent repeat biopsy for LUNG MAP trial and was randomized to Ramucirumab + pembrolizumab.    11/17/2020 started ramucirumab+pembrolizumab.  Completed 4 cycles and then 2/10/2021 scans showed progression.    2/24/2021 he was subsequent started on gemcitabine with Dr. Cruz.        Resolved Hospital Problems   No resolved problems to display.       Follow Up Appointments:  Future Appointments   Date Time Provider Department Center   11/10/2022  2:40 PM Catia Roman MD [facility] [place] [facility] [place] [place]   11/14/2022  9:00 AM [facility] CT1 LIMIT 400 LBS [facility] CT [place] [facility] [place] [facility] [place]   11/22/2022  1:00 PM CHAIR 02 [facility] [place]MO [facility] [place]   11/28/2022  8:30 AM Guerline Higgins MD [facility] HEM ONC [facility] [place] [place] [facility]       Allergies:Review of patient's allergies indicates:  No Known Allergies    Medications: Review discharge medications with patient and family and provide education.      Current Discharge Medication List        START taking these medications    Details   albuterol-ipratropium (DUO-NEB) 2.5 mg-0.5 mg/3 mL nebulizer solution Take 3 mLs by nebulization every 4 (four) hours as needed for Wheezing or Shortness of Breath. Rescue  Qty: 90 mL, Refills: 1      fluticasone-salmeterol diskus inhaler 100-50 mcg Inhale 1 puff into the lungs 2 (two) times daily. Controller  Qty: 60 each, Refills: 1      polyethylene glycol (GLYCOLAX) 17 gram/dose powder Mix 1 capful (17 grams total) with a liquid and take by mouth once daily.  Qty: 510 g, Refills:  0      !! rivaroxaban (XARELTO) 15 mg Tab Take 1 tablet (15 mg total) by mouth 2 (two) times daily with meals. for 21 days  (start taking on 11/9/22)  Qty: 42 tablet, Refills: 0      !! rivaroxaban (XARELTO) 20 mg Tab Take 1 tablet (20 mg total) by mouth daily with dinner or evening meal. (Start taking on 12/1/22 as maintenance dose)  Qty: 30 tablet, Refills: 1      tiotropium (SPIRIVA) 18 mcg inhalation capsule Inhale 1 capsule (18 mcg total) into the lungs once daily. Controller  Qty: 30 capsule, Refills: 1       !! - Potential duplicate medications found. Please discuss with provider.        CONTINUE these medications which have NOT CHANGED    Details   albuterol (VENTOLIN HFA) 90 mcg/actuation inhaler Inhale 2 puffs into the lungs every 6 (six) hours as needed for Wheezing. Rescue  Qty: 18 g, Refills: 11      amLODIPine (NORVASC) 10 MG tablet Take 1 tablet (10 mg total) by mouth once daily.  Qty: 90 tablet, Refills: 3    Comments: .      ascorbic acid-multivit-min (VITAMIN C ENERGY BOOSTER) 1,000 mg PwEP Take 3,000 mg by mouth once daily. Patient takes 3,000 mg per day      aspirin 81 MG Chew Take 1 tablet (81 mg total) by mouth once daily.  Qty: 30 tablet, Refills: 11      atorvastatin (LIPITOR) 80 MG tablet Take 1 tablet (80 mg total) by mouth once daily.  Qty: 90 tablet, Refills: 3      cilostazoL (PLETAL) 100 MG Tab Take 1 tablet (100 mg total) by mouth 2 (two) times daily.  Qty: 60 tablet, Refills: 11      clopidogreL (PLAVIX) 75 mg tablet Take 1 tablet (75 mg total) by mouth once daily.  Qty: 30 tablet, Refills: 11      furosemide (LASIX) 40 MG tablet Take 1 tablet (40 mg total) by mouth 2 (two) times a day.  Qty: 60 tablet, Refills: 2      metoprolol succinate (TOPROL-XL) 50 MG 24 hr tablet Take 1 tablet (50 mg total) by mouth 2 (two) times daily.  Qty: 60 tablet, Refills: 1    Comments: .           STOP taking these medications       fluticasone-umeclidin-vilanter (TRELEGY ELLIPTA) 100-62.5-25 mcg DsDv  Comments:   Reason for Stopping:                 I have seen and examined this patient within the last 30 days. My clinical findings that support the need for the home health skilled services and home bound status are the following:no   Weakness/numbness causing balance and gait disturbance due to COPD Exacerbation, Weakness/Debility, and Malignancy/Cancer making it taxing to leave home.  Requiring assistive device to leave home due to unsteady gait caused by  COPD Exacerbation, Weakness/Debility, and Malignancy/Cancer.  Medical restrictions requiring assistance of another human to leave home due to  Home oxygen requirement.     Diet:   cardiac diet and 2 gram sodium diet    Labs:  CBC and BMP weekly on Monday x 2 weeks- fax to pcp    Referrals/ Consults  Physical Therapy to evaluate and treat. Evaluate for home safety and equipment needs; Establish/upgrade home exercise program. Perform / instruct on therapeutic exercises, gait training, transfer training, and Range of Motion.  Occupational Therapy to evaluate and treat. Evaluate home environment for safety and equipment needs. Perform/Instruct on transfers, ADL training, ROM, and therapeutic exercises.  Speech Therapy  to evaluate and treat for  Cognition.   to evaluate for community resources/long-range planning.  Aide to provide assistance with personal care, ADLs, and vital signs.    Activities:   ambulate in house with assistance    Nursing:   Agency to admit patient within 24 hours of hospital discharge unless specified on physician order or at patient request    SN to complete comprehensive assessment including routine vital signs. Instruct on disease process and s/s of complications to report to MD. Review/verify medication list sent home with the patient at time of discharge  and instruct patient/caregiver as needed. Frequency may be adjusted depending on start of care date.     Skilled nurse to perform up to 3 visits PRN for symptoms related  to diagnosis    Notify MD if SBP > 160 or < 90; DBP > 90 or < 50; HR > 120 or < 50; Temp > 101; O2 < 88%; Other:       Ok to schedule additional visits based on staff availability and patient request on consecutive days within the home health episode.    When multiple disciplines ordered:    Start of Care occurs on Sunday - Wednesday schedule remaining discipline evaluations as ordered on separate consecutive days following the start of care.    Thursday SOC -schedule subsequent evaluations Friday and Monday the following week.     Friday - Saturday SOC - schedule subsequent discipline evaluations on consecutive days starting Monday of the following week.    For all post-discharge communication and subsequent orders please contact patient's primary care physician.     Miscellaneous   Heart Failure:      SN to instruct on the following:    Instruct on the definition of CHF.   Instruct on the signs/sympoms of CHF to be reported.   Instruct on and monitor daily weights.   Instruct on factors that cause exacerbation.   Instruct on action, dose, schedule, and side effects of medications.   Instruct on diet as prescribed.   Instruct on activity allowed.   Instruct on life-style modifications for life long management of CHF   SN to assess compliance with daily weights, diet, medications, fluid retention,    safety precautions, activities permitted and life-style modifications.   Additional 1-2 SN visits per week as needed for signs and symptoms     of CHF exacerbation.      For Weight Gain > 2-3 lbs in 1 day or 4-6 lbs over 1 week notify PCP:    Home Oxygen:  Oxygen at 2-3 L/min nasal canula to be used:  Continuously.    Home Health Aide:  Nursing Twice weekly, Physical Therapy Three times weekly, Occupational Therapy Three times weekly, Speech Language Pathology Three times weekly, Respiratory Therapy Twice weekly, and Home Health Aide Twice weekly    Wound Care Orders  no    I certify that this patient is confined to his  home and needs intermittent skilled nursing care, physical therapy, speech therapy, and occupational therapy.

## 2022-11-09 NOTE — PLAN OF CARE
11/09/22 1409   Post-Acute Status   Post-Acute Authorization Home Health   Home Health Status Set-up Complete/Auth obtained   Coverage MEDICARE AB   Patient choice form signed by patient/caregiver List from System Post-Acute Care   Discharge Delays None known at this time   Discharge Plan   Discharge Plan A Home Health   Discharge Plan B Home with family     Pt will resume services per Rani with [facility].

## 2022-11-09 NOTE — ASSESSMENT & PLAN NOTE
-On maintenance chemotherapy and radiation   -Outpatient follow up with oncology   -Palliative Care consult.

## 2022-11-09 NOTE — PLAN OF CARE
Problem: Fall Injury Risk  Goal: Absence of Fall and Fall-Related Injury  Outcome: Ongoing, Progressing     Problem: Adult Inpatient Plan of Care  Goal: Plan of Care Review  Outcome: Ongoing, Progressing  Goal: Patient-Specific Goal (Individualized)  Outcome: Ongoing, Progressing  Goal: Absence of Hospital-Acquired Illness or Injury  Outcome: Ongoing, Progressing  Goal: Optimal Comfort and Wellbeing  Outcome: Ongoing, Progressing  Goal: Readiness for Transition of Care  Outcome: Ongoing, Progressing     Problem: Infection  Goal: Absence of Infection Signs and Symptoms  Outcome: Ongoing, Progressing     Problem: Coping Ineffective  Goal: Effective Coping  Outcome: Ongoing, Progressing

## 2022-11-09 NOTE — ASSESSMENT & PLAN NOTE
-Admitted to inaptient status  -Patient with Hypoxic Respiratory failure which is Acute on chronic.  he is on home oxygen at 2-3 LPM. -Supplemental oxygen was provided and noted-  .   -Signs/symptoms of respiratory failure include- increased work of breathing and respiratory distress.   -Contributing diagnoses includes - COPD and progression of malignancy   -Labs and images were reviewed. Patient Has not had a recent ABG.   -Will treat underlying causes and adjust management of respiratory failure as follows-   -Likely related to a COPD exacerbation   -Exacerbation has been treated with steroids and scheduled duo-nebs.  Wheezing has resolved and he is on his home level of O2  -Complete course of prednisone x 5 days.  Will change to scheduled nebulizers q6h while awake to allow him to sleep.   -Likely resume trelegy tomorrow.  -Home tomorrow if delirium continues to improve

## 2022-11-09 NOTE — CONSULTS
[facility] - Telemetry  Neurology  Consult Note    Patient Name: Kashif Iverson  MRN: [id number]  Admission Date: 11/3/2022  Hospital Length of Stay: 6 days  Code Status: Full Code   Attending Provider: Raphael Hathaway MD  Consulting Provider: Nanda Pérez MD  Primary Care Physician: Guerline Higgins MD  Principal Problem:Acute on chronic respiratory failure with hypoxia    Inpatient consult to Neurology  Consult performed by: Nanda Pérez MD  Consult ordered by: Raphael Hathaway MD      Subjective:     Chief Complaint:  delirium    HPI:   60yo M w hx of SCC lung mets to bone, afib on eliqius, HTN, hx of CVA and vascular risk factors who consulted to neurology w concerns for worsening delirium.     Patient admitted for acute on chronic hypoxemic resp failure. CTH unremarkable for acute process. Patient continues to wax and wane, improved today upon neurology eval.    MRI brain pending.     Past Medical History:   Diagnosis Date    Combined systolic and diastolic heart failure     Hypertension     Lung cancer     NSCLC    Lung mass     left lung    Peripheral artery disease        Past Surgical History:   Procedure Laterality Date    BRONCHOSCOPY N/A 08/30/2019    Procedure: Bronchoscopy;  [facility];  Location: [facility] OR [address] [place] [address];  Service: Anesthesiology;  Laterality: N/A;    CORONARY ANGIOGRAPHY N/A 03/04/2022    Procedure: ANGIOGRAM, CORONARY ARTERY;  Surgeon: Robson Green MD;  Location: [facility] CATH LAB;  Service: Cardiology;  Laterality: N/A;    INSERTION OF TUNNELED CENTRAL VENOUS CATHETER (CVC) WITH SUBCUTANEOUS PORT         Review of patient's allergies indicates:  No Known Allergies    Current Neurological Medications:    No current facility-administered medications on file prior to encounter.     Current Outpatient Medications on File Prior to Encounter   Medication Sig    albuterol (VENTOLIN HFA) 90 mcg/actuation inhaler Inhale 2 puffs into the lungs every 6 (six) hours as needed for  Wheezing. Rescue    amLODIPine (NORVASC) 10 MG tablet Take 1 tablet (10 mg total) by mouth once daily.    ascorbic acid-multivit-min (VITAMIN C ENERGY BOOSTER) 1,000 mg PwEP Take 3,000 mg by mouth once daily. Patient takes 3,000 mg per day    aspirin 81 MG Chew Take 1 tablet (81 mg total) by mouth once daily.    atorvastatin (LIPITOR) 80 MG tablet Take 1 tablet (80 mg total) by mouth once daily.    cilostazoL (PLETAL) 100 MG Tab Take 1 tablet (100 mg total) by mouth 2 (two) times daily.    clopidogreL (PLAVIX) 75 mg tablet Take 1 tablet (75 mg total) by mouth once daily.    furosemide (LASIX) 40 MG tablet Take 1 tablet (40 mg total) by mouth 2 (two) times a day.    metoprolol succinate (TOPROL-XL) 50 MG 24 hr tablet Take 1 tablet (50 mg total) by mouth 2 (two) times daily.    [DISCONTINUED] fluticasone-umeclidin-vilanter (TRELEGY ELLIPTA) 100-62.5-25 mcg DsDv Inhale 1 puff into the lungs once daily.      Family History       Problem Relation (Age of Onset)    Cancer Father, Sister    Diabetes Mother    Heart defect Mother    No Known Problems Son          Tobacco Use    Smoking status: Former     Packs/day: 1.00     Years: 25.00     Pack years: 25.00     Types: Cigarettes     Quit date:      Years since quittin.8    Smokeless tobacco: Never   Substance and Sexual Activity    Alcohol use: Yes     Comment: 11/3/22: Occ.    Drug use: Never    Sexual activity: Yes     Partners: Female     ROS negative other than stated in HPI    Objective:     Vital Signs (Most Recent):  Temp: 98.6 °F (37 °C) (22 1546)  Pulse: 91 (22 1546)  Resp: 18 (22 1546)  BP: 118/68 (22 1546)  SpO2: 99 % (22 1546) Vital Signs (24h Range):  Temp:  [97.7 °F (36.5 °C)-99.2 °F (37.3 °C)] 98.6 °F (37 °C)  Pulse:  [] 91  Resp:  [18-20] 18  SpO2:  [84 %-99 %] 99 %  BP: (117-142)/(58-84) 118/68     Weight: 87.8 kg (193 lb 9 oz)  Body mass index is 27.77 kg/m².    Gen: in NAD, sitting up , pleasant  HEENT: NCAT,  MMM  Resp effort: normal  Abd: soft  Skin: no rashes    Gen: alert and oriented to person, year, president. Does not know location.  Attn: normal  Speech: no evidence of dysarthria  CN: PERRL, EOMI, facial sensation V1-V3 intact, symmetric facies, hearing grossly intact, uvula and palate midline  Motor: no focal weakness appreciated, able to hold extremities to gravity bilateral upper and lower  Reflexes 2+ symmetric biceps, brachiaradialis, patellar  Sensation to light touch intact    Plantar reflexes downgoing    Coordination not assessd  Gait not assessed    Significant Labs:   WBC 14.88  K 3.4    Significant Imaging:   CTH 11/5 wo acute intracranial process  MRI brain pending    Assessment and Plan:     62yo M w hx of SCC lung mets to bone, afib on eliqius, HTN, hx of CVA and vascular risk factors who consulted to neurology w concerns for worsening delirium in setting of acute on chronic hypoxic respiratory failure. Given w patient w mets and vascular risk factors, getting MRI brain wo contrast to rule out lesions.    - pending MRI brain, otherwise continue delirium precautions    Active Diagnoses:    Diagnosis Date Noted POA    PRINCIPAL PROBLEM:  Acute on chronic respiratory failure with hypoxia [J96.21] 06/10/2022 Yes    ACP (advance care planning) [Z71.89] 10/03/2022 Not Applicable    Debility [R53.81] 10/03/2022 Yes    Persistent atrial fibrillation [I48.19] 09/30/2022 Yes    Chronic obstructive pulmonary disease with acute exacerbation [J44.1] 09/05/2022 Yes    Coronary artery disease involving native coronary artery of native heart without angina pectoris [I25.10] 03/14/2022 Yes     Chronic    PAD (peripheral artery disease) [I73.9] 03/14/2022 Yes    Acute delirium [R41.0] 03/02/2022 No    Chronic diastolic heart failure [I50.32] 02/23/2022 Yes    Essential hypertension [I10]  Yes     Chronic    Lung cancer, main bronchus, left [C34.02] 09/10/2019 Yes     Chronic      Problems Resolved During this  Admission:       VTE Risk Mitigation (From admission, onward)           Ordered     rivaroxaban tablet 20 mg  With dinner         11/09/22 0901     IP VTE HIGH RISK PATIENT  Once         11/03/22 2019     Place sequential compression device  Until discontinued         11/03/22 2019                    Thank you for your consult. I will follow-up with patient. Please contact us if you have any additional questions.    Nanda Pérez MD  Neurology  [facility]

## 2022-11-09 NOTE — ASSESSMENT & PLAN NOTE
-Presently appears sinus  -Continue metoprolol and OAC  -Sent Rx to pharmacy - eliquis is prohibitively expensive.  Changed to xarelto which he can get free for at least one month at discharge.  Consulted CM to assist enrolling in assistance programs.

## 2022-11-09 NOTE — ASSESSMENT & PLAN NOTE
-On maintenance chemotherapy and radiation   -Outpatient follow up with oncology   -Palliative Care consult appreciated.

## 2022-11-09 NOTE — ASSESSMENT & PLAN NOTE
-Admitted to inaptient status  -Patient with Hypoxic Respiratory failure which is Acute on chronic.  he is on home oxygen at 2-3 LPM. -Supplemental oxygen was provided and noted-  .   -Signs/symptoms of respiratory failure include- increased work of breathing and respiratory distress.   -Contributing diagnoses includes - COPD and progression of malignancy   -Labs and images were reviewed. Patient Has not had a recent ABG.   -Will treat underlying causes and adjust management of respiratory failure as follows-   -Likely related to a COPD exacerbation   -Exacerbation has been treated with steroids and scheduled duo-nebs.  Wheezing has resolved and he is on his home level of O2  -Completed course of prednisone x 5 days.  Continue with scheduled nebulizers q6h while awake to allow him to sleep at night  -Start spiriva and advair today.  Discussed with pulmonology and these are absolutely necessary to control his COPD - short acting albuterol will not be sufficient and would lead to a rapid readmission most likely.  Sent Rxs to pharmacy to price check and they are too expensive for him to afford.  Have consulted CM to help enroll in patient assistance program and to see if we can provide some amount of these..   -Discharge home once delirium improved.

## 2022-11-09 NOTE — PROGRESS NOTES
[facility] received a message from nurse, April that MD to hold off until neuro consult.CM left a message for pt's wife.

## 2022-11-09 NOTE — ASSESSMENT & PLAN NOTE
-Noted significant delirium during his stay  -Head CT without any acute findings  -Suspect secondary to steroids and hospitalization related delirium.  -Search for other reversible sources of encephalopathy unrevealing so far:  Ammonia, TSH are normal.  Await RPR, folate and b12.  -No evidence of infection at this time.  -Continue delirium precautions  -Will decrease dose of seroquel at night

## 2022-11-09 NOTE — PLAN OF CARE
Problem: Occupational Therapy  Goal: Occupational Therapy Goal  Description: Goals to be met by: 11/18/22     Patient will increase functional independence with ADLs by performing:    UE Dressing with Modified [place] and Supervision.  LE Dressing with Modified [place] and Supervision.  Grooming while standing at sink with Modified [place] and Supervision.  Toileting from toilet with Modified [place], Supervision, and Assistive Devices as needed for hygiene and clothing management.   Supine to sit with Modified [place] and Supervision.  Step transfer with Modified [place] and Supervision  Toilet transfer to toilet with Modified [place] and Supervision.  Upper extremity exercise program x15 reps per handout, with assistance as needed.  Educate the patient re: Pursed Lipped Breathing and Energy Conservation  Outcome: Ongoing, Progressing     The patient was able to amb with SBA to the sink. The patient was able to follow commands to perform grooming at the sink.

## 2022-11-09 NOTE — PLAN OF CARE
11/09/22 1149   Medicare Message   Important Message from Medicare regarding Discharge Appeal Rights Explained to patient/caregiver  (SW explained IMM. Pt's wife, Natty verbally expressed understanding. SW will mail a copy to address on file.)   Date IMM was signed 11/09/22   Time IMM was signed 1149     [id number] [phone]

## 2022-11-09 NOTE — PROGRESS NOTES
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     Patient reports developing worsening SOB that started about 2 days prior to presentation. He has a chronic productive cough and lower extremity edema that have not worsened. His wife checked his SpO2 at home and was noted to be 75% on 2-3L O2, prompting him to present to the ED. He had a recent hospitalization (9/28 - 10/7) for hypoxia 2/2 COPD/HF, Afib with RVR, cellulitis and confusion. He was referred to palliative as outpatient but reported that he wants to remain full code at that time. Patient reported that he wants to be a full code at this time as well. While in the ED, the patient was hypoxic (SpO2: 88%) on 8L of supplemental O2., tachypnic and tachycardic (116), Labs showed borderline leukocytosis (11.54), hypokalemia (2.9), elevated BNP (271), elevated lactic acid (2.7). CTA of the chest showed no PE, chronic occlusion of the left mainstem bronchus, chronic consolidative changes in the left upper and lower lobes and right lower lobe and a small to moderate volume right pleural effusion. The patient was admitted for further management.       Overview/Hospital Course:  60 y/o male with lung cancer presents with acute on chronic hypoxemic respiratory failure.  Started on nebs and steroids.  Able to wean down oxygen.  Hospitalization complicated by  delirium.  Unremarkable Head CT.      Interval History: No acute events overnight.  He remains calm and breathing comfortably on home level of O2.  Delirium persists and is a bit worse actually.  Discussed with wife on phone and all questions answered.    Review of Systems   HENT:  Negative for ear discharge and ear pain.    Eyes:  Negative for discharge and itching.   Endocrine: Negative for cold intolerance and heat intolerance.   Neurological:  Positive for weakness. Negative for seizures and syncope.   Psychiatric/Behavioral:  Positive for confusion.    Objective:     Vital Signs (Most Recent):  Temp: 98.4 °F (36.9 °C) (11/09/22 1134)  Pulse: 102 (11/09/22 1256)  Resp: 20 (11/09/22 1256)  BP: 129/73 (11/09/22 1134)  SpO2: (!) 84 % (11/09/22 1256)   Vital Signs (24h Range):  Temp:  [97.7 °F (36.5 °C)-99.2 °F (37.3 °C)] 98.4 °F (36.9 °C)  Pulse:  [] 102  Resp:  [18-20] 20  SpO2:  [84 %-99 %] 84 %  BP: (117-142)/(58-84) 129/73     Weight: 87.8 kg (193 lb 9 oz)  Body mass index is 27.77 kg/m².    Intake/Output Summary (Last 24 hours) at 11/9/2022 1441  Last data filed at 11/9/2022 0440  Gross per 24 hour   Intake 240 ml   Output --   Net 240 ml        Physical Exam  Vitals and nursing note reviewed.   Constitutional:       General: He is not in acute distress.     Appearance: Normal appearance. He is not ill-appearing, toxic-appearing or diaphoretic.   HENT:      Head: Normocephalic and atraumatic.      Right Ear: External ear normal.      Left Ear: External ear normal.      Nose: Nose normal.      Mouth/Throat:      Mouth: Mucous membranes are moist.   Eyes:      Extraocular Movements: Extraocular movements intact.      Conjunctiva/sclera: Conjunctivae normal.   Cardiovascular:      Rate and Rhythm: Tachycardia present.      Pulses: Normal pulses.      Heart sounds: No murmur heard.  Pulmonary:      Effort: No respiratory distress.      Comments: Good air movement - no wheezing  Abdominal:      General:  "Abdomen is flat.      Palpations: Abdomen is soft.      Tenderness: There is no abdominal tenderness.   Musculoskeletal:      Cervical back: Normal range of motion. No rigidity.      Right lower leg: No edema.      Left lower leg: No edema.   Skin:     General: Skin is warm.      Capillary Refill: Capillary refill takes less than 2 seconds.   Neurological:      General: No focal deficit present.      Mental Status: He is alert.      Comments: Oriented to self, wife's name, home address but not president, year, hospital or city.  [place]  Wife states this is new for him.  Diffuse weakness - non-focal exam   Psychiatric:         Mood and Affect: Mood normal.       Significant Labs: All pertinent labs within the past 24 hours have been reviewed.    Significant Imaging: I have reviewed all pertinent imaging results/findings within the past 24 hours.      Assessment/Plan:      * Acute on chronic respiratory failure with hypoxia  -Admitted to inaptient status  -Patient with Hypoxic Respiratory failure which is Acute on chronic.  he is on home oxygen at 2-3 LPM. -Supplemental oxygen was provided and noted-  .   -Signs/symptoms of respiratory failure include- increased work of breathing and respiratory distress.   -Contributing diagnoses includes - COPD and progression of malignancy   -Labs and images were reviewed. Patient Has not had a recent ABG.   -Will treat underlying causes and adjust management of respiratory failure as follows-   -Likely related to a COPD exacerbation   -Exacerbation has been treated with steroids and scheduled duo-nebs.  Wheezing has resolved and he is on his home level of O2  -Completed course of prednisone x 5 days.  Continue with scheduled nebulizers q6h while awake to allow him to sleep at night  -Start spiriva and advair today.  Discussed with pulmonology and these are absolutely necessary to control his COPD - short acting albuterol will not be sufficient and would lead " to a rapid readmission most likely.  Sent Rxs to pharmacy to price check and they are too expensive for him to afford.  Have consulted CM to help enroll in patient assistance program and to see if we can provide some amount of these..   -Discharge home once delirium improved.    Chronic obstructive pulmonary disease with acute exacerbation  -Treatment as above.    Acute delirium  -Noted significant delirium during his stay  -Head CT without any acute findings  -Suspect secondary to steroids and hospitalization related delirium.  -Search for other reversible sources of encephalopathy unrevealing so far:  UA, Ammonia, B12 and TSH are normal.  Await RPR and folate.  -No evidence of infection at this time.  Leukocytosis most likely due to steroids from prednisone  -Delirium a bit worse today.  Will check MRI for possible stroke vs brain mets and consult neurology.  -Continue delirium precautions  -Continue low dose of seroquel at night  -Stop other sedating meds - last received pain meds 11/5.    ACP (advance care planning)  -Patient is full code    Debility  -PT/OT consulted and recommend HH at discharge  -Hospital bed ordered for at home 11/8.    Persistent atrial fibrillation  -Presently appears sinus  -Continue metoprolol and OAC  -Sent Rx to pharmacy - eliquis is prohibitively expensive.  Changed to xarelto which he can get free for at least one month at discharge.  Consulted CM to assist enrolling in assistance programs.    Coronary artery disease involving native coronary artery of native heart without angina pectoris  -Denies chest pain  -Continue aspirin, plavix and statin.    PAD (peripheral artery disease)  -Continue aspirin, plavix and statin    Chronic diastolic heart failure  -Echocardiogram (2/22/2022): EF: 35%-40%, GIDD, normal RV systolic function, mild TR   -Echocardiogram (5/18/2022): EF: 65%, indeterminate LV diastolic function, mild TR, PA pressure of 54 mmHg  -Echocardiogram (9/30/2022): EF: 60%,  mildly reduced RV systolic function, PA pressure of 40 mmHg.   -Patient presented with worsening SOB and AMADEO   -Elevated BNP, but below baseline   -Reported compliance with his Lasix, hypokalemic on presentation   -Doubt exacerbation   -Continue strict ins/outs and home lasix    Essential hypertension  -Well controlled  -Continue norvasc and metoprolol    Lung cancer, main bronchus, left  -On maintenance chemotherapy and radiation   -Outpatient follow up with oncology   -Palliative Care consult appreciated.      VTE Risk Mitigation (From admission, onward)         Ordered     rivaroxaban tablet 20 mg  With dinner         11/09/22 0901     IP VTE HIGH RISK PATIENT  Once         11/03/22 2019     Place sequential compression device  Until discontinued         11/03/22 2019                Discharge Planning   AZ: 11/9/2022     Code Status: Full Code   Is the patient medically ready for discharge?:     Reason for patient still in hospital (select all that apply): Treatment and Consult recommendations  Discharge Plan A: Home Health   Discharge Delays: None known at this time              Raphael Hathaway MD  Department of Hospital Medicine   [facility] - [place] - Telemetry

## 2022-11-10 NOTE — PROGRESS NOTES
[facility] - Telemetry  Neurology  Progress Note    Patient Name: Kashif Iverson  MRN: [id number]  Admission Date: 11/3/2022  Hospital Length of Stay: 7 days  Code Status: Full Code   Attending Provider: Raphael Hathaway MD  Consulting Provider: Nanda Pérez MD  Primary Care Physician: Guerline Higgins MD  Principal Problem:Acute on chronic respiratory failure with hypoxia    Progress Note  Subjective:     Chief Complaint:  delirium    HPI: 62yo M w hx of SCC lung mets to bone, afib on eliqius, HTN, hx of CVA and vascular risk factors who consulted to neurology w concerns for worsening delirium.      Patient admitted for acute on chronic hypoxemic resp failure. CTH unremarkable for acute process. Patient continues to wax and wane, improved today upon neurology eval.     MRI brain pending.     Interim period:   11/10/22 - naeon. Patient going down for MRI this morning, otherwise states he is doing well. No complaints.     Past Medical History:   Diagnosis Date    Combined systolic and diastolic heart failure     Hypertension     Lung cancer     NSCLC    Lung mass     left lung    Peripheral artery disease        Past Surgical History:   Procedure Laterality Date    BRONCHOSCOPY N/A 08/30/2019    Procedure: Bronchoscopy;  [facility];  Location: [facility] OR [address] [place] [address];  Service: Anesthesiology;  Laterality: N/A;    CORONARY ANGIOGRAPHY N/A 03/04/2022    Procedure: ANGIOGRAM, CORONARY ARTERY;  Surgeon: Robson Green MD;  Location: [facility] CATH LAB;  Service: Cardiology;  Laterality: N/A;    INSERTION OF TUNNELED CENTRAL VENOUS CATHETER (CVC) WITH SUBCUTANEOUS PORT         Review of patient's allergies indicates:  No Known Allergies    Current Neurological Medications:     No current facility-administered medications on file prior to encounter.     Current Outpatient Medications on File Prior to Encounter   Medication Sig    albuterol (VENTOLIN HFA) 90 mcg/actuation inhaler Inhale 2 puffs into the lungs every 6  (six) hours as needed for Wheezing. Rescue    amLODIPine (NORVASC) 10 MG tablet Take 1 tablet (10 mg total) by mouth once daily.    ascorbic acid-multivit-min (VITAMIN C ENERGY BOOSTER) 1,000 mg PwEP Take 3,000 mg by mouth once daily. Patient takes 3,000 mg per day    aspirin 81 MG Chew Take 1 tablet (81 mg total) by mouth once daily.    atorvastatin (LIPITOR) 80 MG tablet Take 1 tablet (80 mg total) by mouth once daily.    cilostazoL (PLETAL) 100 MG Tab Take 1 tablet (100 mg total) by mouth 2 (two) times daily.    clopidogreL (PLAVIX) 75 mg tablet Take 1 tablet (75 mg total) by mouth once daily.    furosemide (LASIX) 40 MG tablet Take 1 tablet (40 mg total) by mouth 2 (two) times a day.    metoprolol succinate (TOPROL-XL) 50 MG 24 hr tablet Take 1 tablet (50 mg total) by mouth 2 (two) times daily.      Family History       Problem Relation (Age of Onset)    Cancer Father, Sister    Diabetes Mother    Heart defect Mother    No Known Problems Son          Tobacco Use    Smoking status: Former     Packs/day: 1.00     Years: 25.00     Pack years: 25.00     Types: Cigarettes     Quit date:      Years since quittin.8    Smokeless tobacco: Never   Substance and Sexual Activity    Alcohol use: Yes     Comment: 11/3/22: Occ.    Drug use: Never    Sexual activity: Yes     Partners: Female     ROS negative other than stated in HPI    Objective:     Vital Signs (Most Recent):  Temp: 98.2 °F (36.8 °C) (11/10/22 1129)  Pulse: 76 (11/10/22 112)  Resp: 18 (11/10/22 1129)  BP: (!) 144/73 (11/10/22 1129)  SpO2: 97 % (11/10/22 1129)   Vital Signs (24h Range):  Temp:  [97.6 °F (36.4 °C)-98.8 °F (37.1 °C)] 98.2 °F (36.8 °C)  Pulse:  [] 76  Resp:  [17-20] 18  SpO2:  [84 %-100 %] 97 %  BP: (111-151)/(59-85) 144/73     Weight: 88.1 kg (194 lb 3.6 oz)  Body mass index is 27.87 kg/m².    Gen: in NAD, sitting up , pleasant  HEENT: NCAT, MMM  Resp effort: normal  Abd: soft  Skin: no rashes     Gen: alert and oriented to person,  year, president. Does not know location. Can hold conversation  Attn: normal  Speech: no evidence of dysarthria  CN: PERRL, EOMI, facial sensation V1-V3 intact, symmetric facies, hearing grossly intact, uvula and palate midline  Motor: no focal weakness appreciated, able to hold extremities to gravity bilateral upper and lower  Reflexes 2+ symmetric biceps, brachiaradialis, patellar  Sensation to light touch intact     Plantar reflexes downgoing     Coordination not assessd  Gait not assessed     Significant Labs:   WBC 15.19  K 3.2     Significant Imaging:   CTH 11/5 wo acute intracranial process  MRI brain personally reviewed wo evidence of acute infarct, masses       Assessment and Plan:     62yo M w hx of SCC lung mets to bone, afib on eliqius, HTN, hx of CVA and vascular risk factors who consulted to neurology w concerns for delirium in setting of acute on chronic hypoxic respiratory failure. Improved, MRI unremarkable.          Active Diagnoses:    Diagnosis Date Noted POA    PRINCIPAL PROBLEM:  Acute on chronic respiratory failure with hypoxia [J96.21] 06/10/2022 Yes    ACP (advance care planning) [Z71.89] 10/03/2022 Not Applicable    Debility [R53.81] 10/03/2022 Yes    Persistent atrial fibrillation [I48.19] 09/30/2022 Yes    Chronic obstructive pulmonary disease with acute exacerbation [J44.1] 09/05/2022 Yes    Coronary artery disease involving native coronary artery of native heart without angina pectoris [I25.10] 03/14/2022 Yes     Chronic    PAD (peripheral artery disease) [I73.9] 03/14/2022 Yes    Acute delirium [R41.0] 03/02/2022 No    Chronic diastolic heart failure [I50.32] 02/23/2022 Yes    Essential hypertension [I10]  Yes     Chronic    Lung cancer, main bronchus, left [C34.02] 09/10/2019 Yes     Chronic      Problems Resolved During this Admission:       VTE Risk Mitigation (From admission, onward)           Ordered     rivaroxaban tablet 20 mg  With dinner         11/09/22 0901     IP VTE HIGH  RISK PATIENT  Once         11/03/22 2019     Place sequential compression device  Until discontinued         11/03/22 2019                    Thank you for your consult. I will sign off. Please contact us if you have any additional questions.    Nanda Pérez MD  Neurology  [facility] [place] [facility]

## 2022-11-10 NOTE — ASSESSMENT & PLAN NOTE
-Noted significant delirium during his stay  -Head CT without any acute findings  -Suspect secondary to steroids and hospitalization related delirium.  -Search for other reversible sources of encephalopathy unrevealing so far:  UA, Ammonia, B12 and TSH are normal.  Await RPR and folate.  -No evidence of infection at this time.  Leukocytosis most likely due to steroids from prednisone  -Delirium a bit worse 11/9 so ordered MRI.  There were no acute strokes or evidence of metastatic disease.  -Neurology consulted and input appreciated  -Continue delirium precautions and treated with low dose of seroquel at night while in house  -Clinically improved today.  Oriented to self, wife, home address, year, hospital but not city

## 2022-11-10 NOTE — PLAN OF CARE
Problem: Fall Injury Risk  Goal: Absence of Fall and Fall-Related Injury  Outcome: Met     Problem: Adult Inpatient Plan of Care  Goal: Plan of Care Review  Outcome: Met  Goal: Patient-Specific Goal (Individualized)  Outcome: Met  Goal: Absence of Hospital-Acquired Illness or Injury  Outcome: Met  Goal: Optimal Comfort and Wellbeing  Outcome: Met  Goal: Readiness for Transition of Care  Outcome: Met     Problem: Infection  Goal: Absence of Infection Signs and Symptoms  Outcome: Met     Problem: Breathing Pattern Ineffective  Goal: Effective Breathing Pattern  Outcome: Met     Problem: Coping Ineffective  Goal: Effective Coping  Outcome: Met     Problem: Physical Therapy  Goal: Physical Therapy Goal  Description: Goals to be met by: 22     Patient will increase functional independence with mobility by performin. Supine to sit with Modified [place]  2. Sit to stand transfer with Modified [place]  3. Gait  x 25-50 feet with Modified [place]    4. Lower extremity exercise program x10 reps per handout, with supervision    Outcome: Met     Problem: Occupational Therapy  Goal: Occupational Therapy Goal  Description: Goals to be met by: 22     Patient will increase functional independence with ADLs by performing:    UE Dressing with Modified [place] and Supervision.  LE Dressing with Modified [place] and Supervision.  Grooming while standing at sink with Modified [place] and Supervision.  Toileting from toilet with Modified [place], Supervision, and Assistive Devices as needed for hygiene and clothing management.   Supine to sit with Modified [place] and Supervision.  Step transfer with Modified [place] and Supervision  Toilet transfer to toilet with Modified [place] and Supervision.  Upper extremity exercise program x15 reps per handout, with assistance as needed.  Educate the patient re: Pursed Lipped Breathing and Energy Conservation  Outcome: Met      Problem: Skin Injury Risk Increased  Goal: Skin Health and Integrity  Outcome: Met

## 2022-11-10 NOTE — PLAN OF CARE
Problem: Fall Injury Risk  Goal: Absence of Fall and Fall-Related Injury  Outcome: Ongoing, Progressing     Problem: Adult Inpatient Plan of Care  Goal: Plan of Care Review  Outcome: Ongoing, Progressing  Goal: Patient-Specific Goal (Individualized)  Outcome: Ongoing, Progressing  Goal: Absence of Hospital-Acquired Illness or Injury  Outcome: Ongoing, Progressing  Goal: Optimal Comfort and Wellbeing  Outcome: Ongoing, Progressing  Goal: Readiness for Transition of Care  Outcome: Ongoing, Progressing     Problem: Infection  Goal: Absence of Infection Signs and Symptoms  Outcome: Ongoing, Progressing       Explained plan of care, with wife at bedside  verbalized understanding.  aware of pending MRI  . No injury during shift, Side rails up x 2, call light by bedside.

## 2022-11-10 NOTE — PLAN OF CARE
[facility] - Telemetry  Discharge Reassessment    Primary Care Provider: Guerline Higgins MD    Expected Discharge Date: 11/11/2022  CM discussed discharge planning with pt's wife, Natty. Pt received nebulizer on 11/9/22 and will call DME to get hospital bed delivered. Pt may dc today if MRI is normal.    Reassessment (most recent)       Discharge Reassessment - 11/10/22 1106          Discharge Reassessment    Assessment Type Discharge Planning Reassessment (P)      Did the patient's condition or plan change since previous assessment? Yes (P)      Discharge Plan discussed with: Spouse/sig other;Patient (P)      Name(s) and Number(s) Natty Iverson (Spouse)   [phone] (P)      Discharge Plan A Home Health (P)      Discharge Plan B Home with family (P)      DME Needed Upon Discharge  nebulizer;hospital bed (P)      Discharge Barriers Identified Other (see comments) (P)    Cost of meds    Why the patient remains in the hospital Requires continued medical care (P)         Post-Acute Status    Post-Acute Authorization Home Health (P)      Home Health Status Set-up Complete/Auth obtained (P)      Coverage Medicare AB (P)      Patient choice form signed by patient/caregiver List from System Post-Acute Care (P)      Discharge Delays Procedure Scheduling (IR, OR, Labs, Echo, Cath, Echo, EEG) (P)

## 2022-11-10 NOTE — PLAN OF CARE
[facility] - Telemetry  Discharge Final Note    Primary Care Provider: Guerline Higgins MD    Expected Discharge Date: 11/10/2022    All CM needs met. Pharmacy will deliver meds to bedside.CM notified nurse, Maryse that pt is ready for discharge from CM standpoint.    Final Discharge Note (most recent)       Final Note - 11/10/22 1534          Final Note    Assessment Type Final Discharge Note     Anticipated Discharge Disposition Home-Health Care [facility]     What phone number can be called within the next 1-3 days to see how you are doing after discharge? [phone]     Hospital Resources/Appts/Education Provided Appointments scheduled and added to AVS;Appointments scheduled by Navigator/Coordinator        Post-Acute Status    Post-Acute Authorization Home Health     Home Health Status Set-up Complete/Auth obtained     Coverage Medicare AB     Patient choice form signed by patient/caregiver List from System Post-Acute Care     Discharge Delays None known at this time                     Important Message from Medicare  Important Message from Medicare regarding Discharge Appeal Rights: Explained to patient/caregiver (SW explained IMM. Pt's wife, Natty verbally expressed understanding. SW will mail a copy to address on file.)     Date IMM was signed: 11/09/22  Time IMM was signed: 1149    Contact Info       Guerline Higgins MD   Specialty: Hematology and Oncology   Relationship: PCP - General    [address]  [address]  [facility] [address]   Phone: [phone]       Next Steps: Follow up

## 2022-11-11 NOTE — NURSING
Called Natty to get an updated ETA. No answer.    1830: Called Natty to get an updated ETA. Natty said she just parked in the parking lot coming up now.  
PER handoff given to ARRON Rossi     Pt resting in bed quietly. NAD noted. No c/o pain. 2 L oxygen in use via nasal canula.  Fall and safety precautions maintained. Bed alarm activated and audible.. Bed locked in lowest position, with side rails up x2. Call bell and personal items within reach   
PER handoff received from NEERAJ Morales     Pt resting in bed quietly. NAD noted. No c/o pain.  Fall and safety precautions maintained. Bed alarm activated and audible.. Bed locked in lowest position, with side rails up x2. Call bell and personal items within reach   
PER handoff received from NEERAJ Raya     Pt resting in bed quietly. NAD noted. No c/o pain. 2 L oxygen via nasal canula. Fall and safety precautions maintained. Bed alarm activated and audible.. Bed locked in lowest position, with side rails up x2. Call bell and personal items within reach   
Per handoff received from Jerica CHAVEZ RN. Patient care assumed. Patients overall condition assessed and patient appears to be in NAD with no complaints of pain. Patient has a Right subclavian port-a-cath that is clean, dry, and intact. Telesitter at bedside and bed alarm maintained to ensure patient safety. Urinal and call light in reach and patient instructed to inform the nurse if anything is needed. Patient stable and will continue to be monitored.    
Pt's port-a-cath flushed with heparin, and deaccessed.  Awaiting Natty to come get pt.  
Pt's wife Natty will be here to  patient at 5:30 pm.  
Received report from NEERAJ Morales. Patient lying in bed resting, NAD noted. Safety Precautions maintained. Will Monitor.   
Reported off to oncoming nurse, patient resting in bed, aaox1. Patient can make needs known to staff, max assist required for adls and transfers, no acute distress noted, safety precautions maintained. Avasys at bedside.    Chart check completed.   
Statement Selected

## 2022-11-14 NOTE — TELEPHONE ENCOUNTER
I have reached out to Kashif Iverson to inform him of the  application process and whats required to apply.  Kashif Iverson did not answer. I left a voicemail and mailed a letter introducing him to the pharmacy patient assistance program. I will follow up in 5 business days.

## 2022-11-16 PROBLEM — F05 DELIRIUM DUE TO GENERAL MEDICAL CONDITION: Status: ACTIVE | Noted: 2022-03-02

## 2022-11-16 PROBLEM — Z71.89 GOALS OF CARE, COUNSELING/DISCUSSION: Status: RESOLVED | Noted: 2022-05-18 | Resolved: 2022-01-01

## 2022-11-16 PROBLEM — J96.01 ACUTE RESPIRATORY FAILURE WITH HYPOXIA AND HYPERCARBIA: Status: RESOLVED | Noted: 2022-01-01 | Resolved: 2022-01-01

## 2022-11-16 PROBLEM — Z71.89 ACP (ADVANCE CARE PLANNING): Status: RESOLVED | Noted: 2022-01-01 | Resolved: 2022-01-01

## 2022-11-16 PROBLEM — J96.12 CHRONIC RESPIRATORY FAILURE WITH HYPOXIA AND HYPERCAPNIA: Status: ACTIVE | Noted: 2022-06-16

## 2022-11-16 PROBLEM — J96.02 ACUTE RESPIRATORY FAILURE WITH HYPOXIA AND HYPERCARBIA: Status: RESOLVED | Noted: 2022-01-01 | Resolved: 2022-01-01

## 2022-11-16 PROBLEM — J96.21 ACUTE ON CHRONIC RESPIRATORY FAILURE WITH HYPOXIA: Status: RESOLVED | Noted: 2022-06-10 | Resolved: 2022-01-01

## 2022-11-16 PROBLEM — C34.90 SQUAMOUS CELL CARCINOMA OF LUNG: Status: ACTIVE | Noted: 2022-08-15

## 2022-11-18 NOTE — PROGRESS NOTES
"Subjective:      Patient ID: Kashif Iverson is a 62 y.o. male.  Pt presents to clinic for hospital f/u. Was admitted for 7 days with a cute hypoxemic respiratory failure. 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 He is oxygen dependent and receives medical and HH care at home. Denies any acute complaints today. Breathing is stable and significant other reports he is more awake and alert.     Review of Systems   Constitutional:  Positive for fatigue. Negative for activity change, appetite change, fever and unexpected weight change.   HENT:  Negative for nasal congestion, ear pain, postnasal drip, rhinorrhea, sneezing, sore throat and voice change.    Eyes:  Negative for pain and visual disturbance.   Respiratory:  Positive for shortness of breath. Negative for cough, chest tightness and wheezing.    Cardiovascular:  Negative for chest pain, palpitations and leg swelling.   Gastrointestinal:  Negative for abdominal pain, constipation, diarrhea, nausea and vomiting.   Endocrine: Negative.    Genitourinary:  Negative for difficulty urinating.   Musculoskeletal:  Positive for myalgias (chronic pain). Negative for arthralgias and gait problem.   Integumentary:  Negative for rash.   Allergic/Immunologic: Negative.    Neurological:  Positive for weakness. Negative for speech difficulty and headaches.   Hematological: Negative.    Psychiatric/Behavioral: Negative.     All other systems reviewed and are negative.      Objective:     Vitals:    11/16/22 1039   BP: 132/80   Pulse: 73   Resp: 16   Temp: 98.2 °F (36.8 °C)   TempSrc: Oral   SpO2: 97%   Weight: 86.4 kg (190 lb 7.6 oz)   Height: 5' 10" (1.778 m)     Physical Exam  Vitals and nursing note reviewed.   Constitutional:       General: He is not in acute distress.     Appearance: Normal appearance. He is well-developed, well-groomed and normal weight. He is ill-appearing.      Interventions: Nasal cannula in place.      Comments: 2L " O2 NC   HENT:      Head: Normocephalic and atraumatic.      Right Ear: External ear normal.      Left Ear: External ear normal.      Nose: Nose normal.      Mouth/Throat:      Lips: Pink.      Mouth: Mucous membranes are moist.   Eyes:      General: Lids are normal. Vision grossly intact. Gaze aligned appropriately.      Conjunctiva/sclera: Conjunctivae normal.      Pupils: Pupils are equal, round, and reactive to light.   Neck:      Trachea: Phonation normal.   Cardiovascular:      Rate and Rhythm: Normal rate and regular rhythm.      Heart sounds: Murmur heard.   Pulmonary:      Effort: Pulmonary effort is normal. No accessory muscle usage or respiratory distress.      Breath sounds: Normal air entry. No transmitted upper airway sounds. Examination of the right-lower field reveals rales. Examination of the left-lower field reveals rales. Decreased breath sounds (diffuse with shallow breathing), rhonchi (diffuse) and rales present. No wheezing.   Abdominal:      General: Abdomen is flat. Bowel sounds are normal. There is no distension.      Palpations: Abdomen is soft.      Tenderness: There is no abdominal tenderness.   Musculoskeletal:      Cervical back: Neck supple.      Right lower leg: No edema.      Left lower leg: No edema.   Skin:     General: Skin is cool and dry.      Capillary Refill: Capillary refill takes more than 3 seconds.      Coloration: Skin is cyanotic (peripheral, toes with clubbing) and pale.      Findings: No rash.   Neurological:      General: No focal deficit present.      Mental Status: He is alert and oriented to person, place, and time. Mental status is at baseline.      Sensory: Sensation is intact.      Motor: Motor function is intact.      Coordination: Coordination is intact.      Gait: Gait is intact.   Psychiatric:         Attention and Perception: Attention and perception normal.         Mood and Affect: Mood and affect normal.         Speech: Speech normal.         Behavior:  Behavior normal. Behavior is cooperative.         Thought Content: Thought content normal.         Cognition and Memory: Cognition and memory normal.         Judgment: Judgment normal.     Assessment and Plan:     1. Chronic respiratory failure with hypoxia and hypercapnia  Stable with continuous supplemental oxygen, no signs of distress and neurologically intact, continue current regimen and f/u with as scheduled     2. Hospital discharge follow-up  Same as above           FABIENNE Lawler, FNP-C  Family/Internal Medicine  [facility]

## 2022-11-21 NOTE — TELEPHONE ENCOUNTER
DOS: 11/21/22    Spoke with patient's wife. Feels as thought patient does not need our services as he has transportation outside of the home. Willl cancel all future appts. No additional needs at this this time. All of my information was given to the patient and family if any questions or concerns arise.      ELIAN Song-ACNP [facility]

## 2022-11-26 NOTE — DISCHARGE SUMMARY
[facility]  Discharge Summary      Patient Name: Kashif Iverson  MRN: [id number]  [facility]: [id number]  Patient Class: IP- Inpatient  Admission Date: 11/3/2022  Hospital Length of Stay: 7 days  Discharge Date and Time: 11/10/2022  8:54 PM  Attending Physician: No att. 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     Patient reports developing worsening SOB that started about 2 days prior to presentation. He has a chronic productive cough and lower extremity edema that have not worsened. His wife checked his SpO2 at home and was noted to be 75% on 2-3L O2, prompting him to present to the ED. He had a recent hospitalization (9/28 - 10/7) for hypoxia 2/2 COPD/HF, Afib with RVR, cellulitis and confusion. He was referred to palliative as outpatient but reported that he wants to remain full code at that time. Patient reported that he wants to be a full code at this time as well. While in the ED, the patient was hypoxic (SpO2: 88%) on 8L of supplemental O2., tachypnic and tachycardic (116), Labs showed borderline leukocytosis (11.54), hypokalemia (2.9), elevated BNP (271), elevated lactic acid (2.7). CTA of the chest showed no PE, chronic occlusion of the left mainstem bronchus, chronic consolidative changes in the left upper and lower lobes and right lower lobe and a small to moderate volume right pleural effusion. The patient was admitted for further management.       Goals of Care Treatment Preferences:  Code Status: Full Code          Consults:   Consults (From admission, onward)         Status Ordering Provider     Inpatient consult to Neurology  Once        Provider:  (Not yet assigned)    Completed JAMIE GUERRERO     Inpatient consult to Social Work  Once        Provider:  (Not yet assigned)    Completed JAMIE GUERRERO     Inpatient consult to Palliative Care  Once        Provider:  (Not yet assigned)    Completed KODI NUÑEZ        Hospital Course By Problem:   * Acute on chronic respiratory failure with hypoxia  -Admitted to inaptient status  -Patient with Hypoxic Respiratory failure which is Acute on chronic.  he is on home oxygen at 2-3 LPM. -Supplemental oxygen was provided and noted-  .   -Signs/symptoms of respiratory failure include- increased work of breathing and respiratory distress.   -Contributing diagnoses includes - COPD and progression of malignancy   -Labs and images were reviewed. Patient Has not had a recent ABG.   -Will treat underlying causes and adjust management of respiratory failure as follows-   -Likely related to a COPD exacerbation   -Exacerbation has been treated with steroids and scheduled duo-nebs.  Wheezing has resolved and he is on his home level of O2  -Completed course of prednisone x 5 days.  Continue with scheduled nebulizers q6h while awake to allow him to sleep at night  -Started spiriva and advair.  Discussed with pulmonology and these are absolutely necessary to control his COPD - short acting albuterol would NOT be sufficient and would lead to a rapid readmission most likely.  Sent Rxs to pharmacy to price check and they are too expensive for him to afford.  Have consulted CM to help enroll in patient assistance program and to see if we can provide some amount of these..   -Discharge home today    Chronic obstructive pulmonary disease with acute exacerbation  -Treatment as above.    Delirium due to general medical condition  -Noted significant delirium during his stay  -Head CT without any acute findings  -Suspect secondary to steroids and  hospitalization related delirium.  -Search for other reversible sources of encephalopathy unrevealing so far:  UA, Ammonia, B12 and TSH are normal.   RPR non-reactive  -No evidence of infection at this time.  Leukocytosis most likely due to steroids from prednisone  -Delirium a bit worse 11/9 so ordered MRI.  There were no acute strokes or evidence of metastatic disease.  -Neurology consulted and input appreciated  -Continued delirium precautions and treated with low dose of seroquel at night while in house  -Clinically improved today.  Oriented to self, wife, home address, year, hospital but not city    Debility  -PT/OT consulted and recommend HH at discharge which was ordered  -Hospital bed ordered for at home 11/8 - unsure if covered by insurance.    Persistent atrial fibrillation  -Presently appears sinus  -Continue metoprolol and OAC  -Sent Rx to pharmacy - eliquis is prohibitively expensive.  Changed to xarelto which he can get free for at least one month at discharge.  Consulted CM to assist enrolling in assistance programs.    Coronary artery disease involving native coronary artery of native heart without angina pectoris  -Denies chest pain  -Continue aspirin, plavix and statin.    PAD (peripheral artery disease)  -Continue aspirin, plavix and statin    Chronic diastolic heart failure  -Echocardiogram (2/22/2022): EF: 35%-40%, GIDD, normal RV systolic function, mild TR   -Echocardiogram (5/18/2022): EF: 65%, indeterminate LV diastolic function, mild TR, PA pressure of 54 mmHg  -Echocardiogram (9/30/2022): EF: 60%, mildly reduced RV systolic function, PA pressure of 40 mmHg.   -Patient presented with worsening SOB and AMADEO   -Elevated BNP, but below baseline   -Reported compliance with his Lasix, hypokalemic on presentation   -Doubt exacerbation   -Continue strict ins/outs and home lasix    Essential hypertension  -Well controlled  -Continue norvasc and metoprolol    Lung cancer, main bronchus, left  -On  "maintenance chemotherapy and radiation   -Outpatient follow up with oncology   -Palliative Care consult appreciated.      Final Active Diagnoses:    Diagnosis Date Noted POA    PRINCIPAL PROBLEM:  Acute on chronic respiratory failure with hypoxia [J96.21] 06/10/2022 Yes    Chronic obstructive pulmonary disease with acute exacerbation [J44.1] 09/05/2022 Yes    Delirium due to general medical condition [F05] 03/02/2022 No    Debility [R53.81] 10/03/2022 Yes    Persistent atrial fibrillation [I48.19] 09/30/2022 Yes    Coronary artery disease involving native coronary artery of native heart without angina pectoris [I25.10] 03/14/2022 Yes     Chronic    PAD (peripheral artery disease) [I73.9] 03/14/2022 Yes    Chronic diastolic heart failure [I50.32] 02/23/2022 Yes    Essential hypertension [I10]  Yes     Chronic    Lung cancer, main bronchus, left [C34.02] 09/10/2019 Yes     Chronic      Problems Resolved During this Admission:    Diagnosis Date Noted Date Resolved POA    ACP (advance care planning) [Z71.89] 10/03/2022 11/16/2022 Not Applicable       Discharged Condition: stable    [place] [facility] [place] Up:   Follow-up Information     Guerline Higgins MD Follow up.    Specialty: Hematology and Oncology  Contact information:  [address] [facility] [address]  [address] [place] [id number] [phone]                       Patient Instructions:      HOSPITAL BED FOR HOME USE     Order Specific Question Answer Comments   Type: Semi-electric    Length of need (1-99 months): 99    Does patient have medical equipment at home? oxygen    Height: 5' 10" (1.778 m)    Weight: 89.5 kg (197 lb 5 oz)    Please check all that apply: Patient requires a bed height different than a fixed height hospital bed to permit transfers to chair, wheelchair, or standing.    Please check all that apply: Patient requires frequent changes in body position and/or has an immediate need for a change in body position.      NEBULIZER KIT " "(SUPPLIES) FOR HOME USE     Order Specific Question Answer Comments   Height: 5' 10" (1.778 m)    Weight: 87.8 kg (193 lb 9 oz)    Does patient have medical equipment at home? oxygen    Length of need (1-99 months): 99    Mask or Mouthpiece? Mouthpiece      NEBULIZER FOR HOME USE     Order Specific Question Answer Comments   Height: 5' 10" (1.778 m)    Weight: 87.8 kg (193 lb 9 oz)    Does patient have medical equipment at home? oxygen    Length of need (1-99 months): 99      Ambulatory referral/consult to Pulmonology   Standing Status: Future   Referral Priority: Routine Referral Type: Consultation   Referral Reason: Specialty Services Required   Requested Specialty: Pulmonary Disease   Number of Visits Requested: 1     Ambulatory referral/consult to [facility]   Standing Status: Future   Referral Priority: Routine Referral Type: Consultation   Referral Reason: Specialty Services Required   Number of Visits Requested: 1     Diet Cardiac     Notify your health care provider if you experience any of the following:  increased confusion or weakness     Notify your health care provider if you experience any of the following:  persistent dizziness, light-headedness, or visual disturbances     Notify your health care provider if you experience any of the following:  worsening rash     Notify your health care provider if you experience any of the following:  severe persistent headache     Notify your health care provider if you experience any of the following:  difficulty breathing or increased cough     Notify your health care provider if you experience any of the following:  severe uncontrolled pain     Notify your health care provider if you experience any of the following:  persistent nausea and vomiting or diarrhea     Notify your health care provider if you experience any of the following:  temperature >100.4     Activity as tolerated       Significant Diagnostic Studies: Labs:   BMP: " No results for input(s): GLU, NA, K, CL, CO2, BUN, CREATININE, CALCIUM, MG in the last 48 hours., CMP No results for input(s): NA, K, CL, CO2, GLU, BUN, CREATININE, CALCIUM, PROT, ALBUMIN, BILITOT, ALKPHOS, AST, ALT, ANIONGAP, ESTGFRAFRICA, EGFRNONAA in the last 48 hours., CBC No results for input(s): WBC, HGB, HCT, PLT in the last 48 hours., INR   Lab Results   Component Value Date    INR 1.1 06/10/2022    INR 1.1 03/04/2022    INR 1.2 02/22/2022   , Lipid Panel   Lab Results   Component Value Date    CHOL 125 02/23/2022    HDL 26 (L) 02/23/2022    LDLCALC 54.2 (L) 02/23/2022    TRIG 224 (H) 02/23/2022    CHOLHDL 20.8 02/23/2022   , Troponin No results for input(s): TROPONINI in the last 168 hours. and A1C: No results for input(s): HGBA1C in the last 4320 hours.    Pending Diagnostic Studies:     None         Medications:  Reconciled Home Medications:      Medication List      START taking these medications    albuterol-ipratropium 2.5 mg-0.5 mg/3 mL nebulizer solution  Commonly known as: DUO-NEB  Inhale contents of 1 vial (3 mLs) by nebulization every 4 (four) hours as needed for Wheezing or Shortness of Breath. Rescue     apixaban 5 mg Tab  Commonly known as: ELIQUIS  Take 1 tablet (5 mg total) by mouth 2 (two) times daily.     GAVILAX 17 gram/dose powder  Generic drug: polyethylene glycol  Mix 1 capful (17 grams total) with a liquid and take by mouth once daily.     rivaroxaban 20 mg Tab  Commonly known as: XARELTO  Take 1 tablet (20 mg total) by mouth daily with dinner or evening meal.  Start taking on: December 1, 2022     SPIRIVA WITH HANDIHALER 18 mcg inhalation capsule  Generic drug: tiotropium  Inhale 1 capsule (18 mcg total) into the lungs via handihaler once daily. Controller     WIXELA INHUB 100-50 mcg/dose diskus inhaler  Generic drug: fluticasone-salmeterol 100-50 mcg/dose  Inhale 1 puff into the lungs 2 (two) times daily. Controller        CONTINUE taking these medications    albuterol 90  mcg/actuation inhaler  Commonly known as: VENTOLIN HFA  Inhale 2 puffs into the lungs every 6 (six) hours as needed for Wheezing. Rescue     amLODIPine 10 MG tablet  Commonly known as: NORVASC  Take 1 tablet (10 mg total) by mouth once daily.     aspirin 81 MG Chew  Take 1 tablet (81 mg total) by mouth once daily.     atorvastatin 80 MG tablet  Commonly known as: LIPITOR  Take 1 tablet (80 mg total) by mouth once daily.     cilostazoL 100 MG Tab  Commonly known as: PLETAL  Take 1 tablet (100 mg total) by mouth 2 (two) times daily.     clopidogreL 75 mg tablet  Commonly known as: PLAVIX  Take 1 tablet (75 mg total) by mouth once daily.     furosemide 40 MG tablet  Commonly known as: LASIX  Take 1 tablet (40 mg total) by mouth 2 (two) times a day.     VITAMIN C ENERGY BOOSTER 1,000 mg Pwep  Generic drug: ascorbic acid-multivit-min  Take 3,000 mg by mouth once daily. Patient takes 3,000 mg per day        STOP taking these medications    fluticasone-umeclidin-vilanter 100-62.5-25 mcg Dsdv  Commonly known as: TRELEGY ELLIPTA            Indwelling Lines/Drains at time of discharge:   Lines/Drains/Airways     None                 Time spent on the discharge of patient: 35 minutes         Raphael Hathaway MD  Department of Hospital Medicine  [facility] [place] [facility]

## 2022-11-28 NOTE — Clinical Note
-RTC tomorrow for Labs (CMP, CBC, Mg) drawn in tx room and chemo if labs ok -RTC in 2 weeks for MD visit for chemo clearance with Dr. Higgins    Next Treatment Date  Upcoming Treatment Dates - OP NSCLC GEMCITABINE Q2W      Days with orders in the following treatment categories:           Chemotherapy           Intrathecal Administration  11/29/2022      gemcitabine (GEMZAR) 2,090 mg in sodium chloride 0.9% 250 mL chemo infusion 12/13/2022      gemcitabine (GEMZAR) 2,090 mg in sodium chloride 0.9% 250 mL chemo infusion 12/27/2022      gemcitabine (GEMZAR) 2,090 mg in sodium chloride 0.9% 250 mL chemo infusion   Provider: Ronaldo

## 2022-11-28 NOTE — PROGRESS NOTES
"  PATIENT: Kashif Iverson  MRN: [id number]  DATE: 11/28/2022      Diagnosis:   1. Lung cancer, main bronchus, left    2. Chronic left shoulder pain    3. Benign essential HTN    4. Follow-up exam    5. PAD (peripheral artery disease)    6. Encounter for antineoplastic chemotherapy    7. Primary hypertension    8. Encounter to discuss test results              Chief Complaint: Lung Cancer, Follow-up, Chemotherapy, and left shoulder pain      Oncologic History:    Oncologic History 1. 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 Palliative chemoradiation with carboplatin and paclitaxel; anaphylactic reaction to paclitaxel  2. Pembrolizumab  3. Pembrolizumab+ramucirumab (LungMAP trial)  4. Gemcitabine    Pathology Squamous cell carcinoma, no actionable mutations, PD-L1 5%        Subjective:    Interval History: Mr. Iverson returns for follow up.     8/30/2019 underwent bronchoscopy that showed "A partially obstructing (about 80% obstructed) mass was found in the middle portion in the left mainstem bronchus. The mass was large and bloody, circumferential, endobronchial, friable and necrotic. 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  No actionable mutations and PD-L1 5%.  9/19/21 staging PET CT showed "Hypermetabolic left lung mass concerning for primary pulmonary malignancy with metastatic disease involving the right 6th and 9th ribs as well as mediastinal and left supraclavicular lymph nodes."  Stage IV squamous cell carcinoma of the lung at diagnosis.    10/8/2019-10/21/2019 he received palliative chemoradiation for rib pain with carboplatin and paclitaxel.  He received 3 cycles of carboplatin and paclitaxel.  He developed anaphylactic reaction to paclitaxel.  The chest was treated with AP:PA fields and the right 9th rib was treated with anterior and posterior oblique fields at 300 cGy per fraction to 3000 cGy. "   Lt chest 6X 300 10 / 10 3,000   Rt 9th rib 6X 300 10 / 10 3,000     10/31/2019-9/10/2020 he received pembrolizumab (completed 15 cycles, last dose 8/21/2020)  9/10/2020 PET CT showed progression of disease.  He was then referred to Dr. Key for evaluation for clinical trials.  10/20/2020 he underwent repeat biopsy for LUNG MAP trial and was randomized to Ramucirumab + pembrolizumab.    11/17/2020 started ramucirumab+pembrolizumab.  Completed 4 cycles and then 2/10/2021 scans showed progression.    2/24/2021 he was subsequent started on gemcitabine with Dr. Cruz.  His care was transferred to Dr. Romo who continued his current regimen and now is currently under my care.   5/17/22-5/22/22 admitted for acute respiratory failure secondary to heart failure exacerbation requiring intubation.  He was extubated and diuresed successfully.  8/15/22-8/20/22 admitted for acute hypoxic respiratory failure secondary to heart failure; diuresed and discharged with supplemental oxygen        Interval hx:      Pt feels well and denied any issues at time of visit post hospitalization.  Scans largely unchanged and reviewed with pt. Pt is feeling much better than on prior visits and only complaint at time of exam is left shoulder pain.  Trial of tramadol given for L shoulder pain.    Wife accompanies him at this visit.    Past Medical History:   Past Medical History:   Diagnosis Date    Combined systolic and diastolic heart failure     Hypertension     Lung cancer     NSCLC    Lung mass     left lung    Peripheral artery disease        Past Surgical HIstory:   Past Surgical History:   Procedure Laterality Date    BRONCHOSCOPY N/A 08/30/2019    Procedure: Bronchoscopy;  Surgeon: Miguel Diagnostic Provider;  Location: [facility] OR [address] [place] [address];  Service: Anesthesiology;  Laterality: N/A;    CORONARY ANGIOGRAPHY N/A 03/04/2022    Procedure: ANGIOGRAM, CORONARY ARTERY;  Surgeon: Robson Green MD;  Location: [facility] CATH LAB;  Service: Cardiology;   Laterality: N/A;    INSERTION OF TUNNELED CENTRAL VENOUS CATHETER (CVC) WITH SUBCUTANEOUS PORT         Family History:   Family History   Problem Relation Age of Onset    Diabetes Mother     Heart defect Mother     Cancer Father     Cancer Sister     No Known Problems Son        Social History:  reports that he quit smoking about 2 years ago. His smoking use included cigarettes. He has a 25.00 pack-year smoking history. He has never used smokeless tobacco. He reports current alcohol use. He reports that he does not use drugs.    Allergies:  Review of patient's allergies indicates:  No Known Allergies    Medications:  Current Outpatient Medications   Medication Sig Dispense Refill    albuterol (VENTOLIN HFA) 90 mcg/actuation inhaler Inhale 2 puffs into the lungs every 6 (six) hours as needed for Wheezing. Rescue 18 g 11    albuterol-ipratropium (DUO-NEB) 2.5 mg-0.5 mg/3 mL nebulizer solution Inhale contents of 1 vial (3 mLs) by nebulization every 4 (four) hours as needed for Wheezing or Shortness of Breath. Rescue 90 mL 1    amLODIPine (NORVASC) 10 MG tablet Take 1 tablet (10 mg total) by mouth once daily. 90 tablet 3    apixaban (ELIQUIS) 5 mg Tab Take 1 tablet (5 mg total) by mouth 2 (two) times daily. 60 tablet 1    ascorbic acid-multivit-min (VITAMIN C ENERGY BOOSTER) 1,000 mg PwEP Take 3,000 mg by mouth once daily. Patient takes 3,000 mg per day      aspirin 81 MG Chew Take 1 tablet (81 mg total) by mouth once daily. 30 tablet 11    atorvastatin (LIPITOR) 80 MG tablet Take 1 tablet (80 mg total) by mouth once daily. 90 tablet 3    cilostazoL (PLETAL) 100 MG Tab Take 1 tablet (100 mg total) by mouth 2 (two) times daily. 60 tablet 11    clopidogreL (PLAVIX) 75 mg tablet Take 1 tablet (75 mg total) by mouth once daily. 30 tablet 11    fluticasone-salmeterol diskus inhaler 100-50 mcg Inhale 1 puff into the lungs 2 (two) times daily. Controller 60 each 1    furosemide (LASIX) 40 MG tablet Take 1 tablet (40 mg total)  by mouth 2 (two) times a day. 60 tablet 2    HYDROcodone-acetaminophen (NORCO) 7.5-325 mg per tablet Take 1 tablet by mouth every 4 (four) hours as needed. for pain.      polyethylene glycol (GLYCOLAX) 17 gram/dose powder Mix 1 capful (17 grams total) with a liquid and take by mouth once daily. 510 g 0    [START ON 12/1/2022] rivaroxaban (XARELTO) 20 mg Tab Take 1 tablet (20 mg total) by mouth daily with dinner or evening meal. 30 tablet 1    tiotropium (SPIRIVA) 18 mcg inhalation capsule Inhale 1 capsule (18 mcg total) into the lungs via handihaler once daily. Controller 30 capsule 1    traMADoL (ULTRAM) 50 mg tablet Take 1 tablet (50 mg total) by mouth every 12 (twelve) hours as needed for Pain. 28 tablet 0     No current facility-administered medications for this visit.       Review of Systems   Constitutional:  Negative for activity change, appetite change, chills, diaphoresis, fatigue, fever and unexpected weight change.   HENT:  Negative for nosebleeds and trouble swallowing.    Eyes:  Negative for visual disturbance.   Respiratory:  Negative for cough, chest tightness, shortness of breath and wheezing.    Cardiovascular:  Negative for chest pain and leg swelling.   Gastrointestinal:  Negative for abdominal distention, abdominal pain, blood in stool, constipation, diarrhea, nausea and vomiting.   Endocrine: Negative for cold intolerance and heat intolerance.   Genitourinary:  Negative for difficulty urinating and dysuria.   Musculoskeletal:  Negative for arthralgias, back pain and myalgias.   Skin:  Negative for color change.   Neurological:  Negative for dizziness, weakness, light-headedness, numbness and headaches.   Hematological:  Negative for adenopathy. Bruises/bleeds easily.   Psychiatric/Behavioral:  Negative for confusion.      ECOG Performance Status:     ECOG SCORE    1 - Restricted in strenuous activity-ambulatory and able to carry out work of a light nature         Objective:      Vitals:    Vitals:    11/28/22 0856   BP: (!) 140/82   BP Location: Left arm   Patient Position: Sitting   BP Method: Large (Automatic)   Pulse: 95   SpO2: (!) 94%           BMI: There is no height or weight on file to calculate BMI.    Physical Exam  Constitutional:       General: He is not in acute distress.     Appearance: Normal appearance. He is not ill-appearing.   HENT:      Head: Normocephalic and atraumatic.      Nose: Nose normal.      Mouth/Throat:      Pharynx: No oropharyngeal exudate or posterior oropharyngeal erythema.   Eyes:      General: No scleral icterus.     Extraocular Movements: Extraocular movements intact.      Conjunctiva/sclera: Conjunctivae normal.      Pupils: Pupils are equal, round, and reactive to light.   Cardiovascular:      Rate and Rhythm: Normal rate and regular rhythm.      Heart sounds: No murmur heard.    No friction rub. No gallop.      Comments: Right chest wall port c/d/i  Pulmonary:      Effort: Pulmonary effort is normal. No respiratory distress.      Breath sounds: No stridor. No wheezing, rhonchi or rales.   Abdominal:      General: Bowel sounds are normal. There is no distension.      Palpations: Abdomen is soft. There is no mass.      Tenderness: There is no abdominal tenderness. There is no guarding or rebound.   Musculoskeletal:         General: No swelling or tenderness. Normal range of motion.      Cervical back: Normal range of motion and neck supple.      Right lower leg: No edema.      Left lower leg: No edema.   Skin:     General: Skin is warm and dry.      Findings: No bruising.   Neurological:      General: No focal deficit present.      Mental Status: He is alert.       Laboratory Data:   No results found for this or any previous visit (from the past 168 hour(s)).            Imaging:       CT Chest Abdomen Pelvis With Contrast (xpd)    Result Date: 8/25/2022  EXAMINATION: CT CHEST ABDOMEN PELVIS WITH CONTRAST (XPD) CLINICAL HISTORY: metastatic lung cancer on  gemcitabine, eval response; Malignant neoplasm of left main bronchus TECHNIQUE: Low dose axial images, sagittal and coronal reformations were obtained from the thoracic inlet to the pubic symphysis following the IV administration of 85 mL of Omnipaque 350 and the oral administration of 450 ml of Readi-Cat barium suspension. COMPARISON: 05/26/2022. FINDINGS: There is a right internal jugular Port-A-Cath present with tip located within the right atrium.  There is a left supraclavicular soft tissue density nodule measuring approximately 3.0 x 1.5 cm (image 16, series 2), not significantly changed.  The heart is not enlarged.  There is a moderate amount of atherosclerotic calcification identified within the coronary arteries in a multi-vessel distribution.  Atherosclerotic calcification is also present within the thoracic aorta which is normal in caliber without evidence for aortic dissection or aneurysmal dilatation.  No hilar or mediastinal adenopathy is identified.  No axillary adenopathy is identified.  Once again, there is occlusion of the left upper lobe bronchus with band of soft tissue density within the left upper lobe extending from the hilum much of which represents atelectasis and parenchymal scarring.  There is also a more nodular opacity within the left upper lobe medially measuring 2.1 x 1.5 cm (image 154, series 6) compared to 1.7 x 1.1 cm on the prior examination.  This is worrisome for enlarging malignancy.  There is also a wedge-shaped opacity within the superior left lower lobe which is unchanged and likely represents an area of atelectasis/scarring.  There is a stable 5 mm pulmonary nodule within the left lower lobe laterally (image 312, series 6).  No new nodules are identified.  There are moderate centrilobular emphysematous changes.  Bony structures appear intact.  There is no evidence for acute fracture or bone destruction. The liver is normal in size and is homogeneous in density with no focal  liver lesions identified.  The gallbladder is contracted.  There is no evidence for intrahepatic or extrahepatic biliary dilatation.  The portal venous system is patent.  The spleen and stomach appear unremarkable.  There are a few pancreatic calcifications present which may be related to chronic pancreatitis.  No focal pancreatic lesions are identified.  The adrenal glands are not enlarged.  The kidneys are normal in size and there is no evidence for abnormal renal masses or hydronephrosis.  The kidneys are noted to concentrate contrast symmetrically.  Atherosclerotic calcification is present within the abdominal aorta which demonstrates ectasia without aneurysmal dilatation.  No para-aortic lymphadenopathy is identified.  The appendix is present and appears unremarkable.  No dilated loops of bowel are evident.  The urinary bladder appears grossly unremarkable.  There are multiple prostatic calcifications present. There is no evidence for pelvic or inguinal lymphadenopathy.     Enlarging nodule within the left upper lobe medially now measuring 2.1 x 1.5 cm compared to 1.7 x 1.1 cm on prior examination dated 05/26/2022.  This is worrisome for malignancy. Left supraclavicular soft tissue density nodule measuring 3.0 x 1.5 cm, not significantly changed..  This is an area of prior biopsy proven malignancy. Occlusion of the left upper lobe bronchus with probable atelectasis/scarring within the left upper lobe extending from the hilum, not significantly changed.  There is also a wedge-shaped opacity within the superior left lower lobe which is also unchanged and likely represents an area of atelectasis/scarring. 5mm pulmonary nodule within the left lower lobe laterally, stable dating back to 11/06/2020. Moderate centrilobular emphysema. Electronically signed by: Rayshawn Degroot MD Date:    08/25/2022 Time:    13:21    Assessment/Plan:         ECOG 1    Lung cancer, main bronchus, left - Primary (Chronic)  8/30/2019  "underwent bronchoscopy that showed "A partially obstructing (about 80% obstructed) mass was found in the middle portion in the left mainstem bronchus. The mass was large and bloody, circumferential, endobronchial, friable and necrotic. 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  No actionable mutations and PD-L1 5%.  9/19/21 staging PET CT showed "Hypermetabolic left lung mass concerning for primary pulmonary malignancy with metastatic disease involving the right 6th and 9th ribs as well as mediastinal and left supraclavicular lymph nodes."  Stage IV squamous cell carcinoma of the lung at diagnosis.     10/8/2019-10/21/2019 he received palliative chemoradiation for rib pain with carboplatin and paclitaxel.  He received 3 cycles of carboplatin and paclitaxel.  He developed anaphylactic reaction to paclitaxel.  The chest was treated with AP:PA fields and the right 9th rib was treated with anterior and posterior oblique fields at 300 cGy per fraction to 3000 cGy.   Lt chest 6X 300 10 / 10 3,000   Rt 9th rib 6X 300 10 / 10 3,000      10/31/2019-9/10/2020 he received pembrolizumab (completed 15 cycles, last dose 8/21/2020)  9/10/2020 PET CT showed progression of disease.  He was then referred to Dr. Key for evaluation for clinical trials.  10/20/2020 he underwent repeat biopsy for LUNG MAP trial and was randomized to Ramucirumab + pembrolizumab.     11/17/2020 started ramucirumab+pembrolizumab.  Completed 4 cycles and then 2/10/2021 scans showed progression.     2/24/2021 he was subsequent started on gemcitabine with Dr. Cruz.    -Treated in 9/22 with XRT for OPAL lesion        A/P 11/28/22  -Scans stable and reviewed with pt and wife  -Resume chemo, labs for tomorrow and give chemo if ok.          Chronic combined systolic and diastolic heart failure (Chronic)/Chronic hypoxia  -Stable      Left shoulder pain  -chronic  -No signs of cancer " "of fracture on Xray  -Trial of tramadol and referral to pain clinic placed      Problem List Items Addressed This Visit          Cardiac/Vascular    PAD (peripheral artery disease)    Overview     LUIS FELIPE 3/11/22            Oncology    Lung cancer, main bronchus, left - Primary (Chronic)    Overview     8/30/2019 underwent bronchoscopy that showed "A partially obstructing (about 80% obstructed) mass was found in the middle portion in the left mainstem bronchus. The mass was large and bloody, circumferential, endobronchial, friable and necrotic. 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  No actionable mutations and PD-L1 5%.  9/19/21 staging PET CT showed "Hypermetabolic left lung mass concerning for primary pulmonary malignancy with metastatic disease involving the right 6th and 9th ribs as well as mediastinal and left supraclavicular lymph nodes."  Stage IV squamous cell carcinoma of the lung at diagnosis.    10/8/2019-10/21/2019 he received palliative chemoradiation for rib pain with carboplatin and paclitaxel.  He received 3 cycles of carboplatin and paclitaxel.  He developed anaphylactic reaction to paclitaxel.  The chest was treated with AP:PA fields and the right 9th rib was treated with anterior and posterior oblique fields at 300 cGy per fraction to 3000 cGy.   Lt chest 6X 300 10 / 10 3,000   Rt 9th rib 6X 300 10 / 10 3,000     10/31/2019-9/10/2020 he received pembrolizumab (completed 15 cycles, last dose 8/21/2020)  9/10/2020 PET CT showed progression of disease.  He was then referred to Dr. Key for evaluation for clinical trials.  10/20/2020 he underwent repeat biopsy for LUNG MAP trial and was randomized to Ramucirumab + pembrolizumab.    11/17/2020 started ramucirumab+pembrolizumab.  Completed 4 cycles and then 2/10/2021 scans showed progression.    2/24/2021 he was subsequent started on gemcitabine with Dr. Cruz.         " Relevant Medications    traMADoL (ULTRAM) 50 mg tablet    Other Relevant Orders    COMPREHENSIVE METABOLIC PANEL    CBC W/ AUTO DIFFERENTIAL    Magnesium    COMPREHENSIVE METABOLIC PANEL    CBC W/ AUTO DIFFERENTIAL    Magnesium     Other Visit Diagnoses       Chronic left shoulder pain        Relevant Orders    Ambulatory referral/consult to Pain Clinic    Benign essential HTN        Follow-up exam        Encounter for antineoplastic chemotherapy        Primary hypertension        Encounter to discuss test results              Orders Placed This Encounter   Procedures    COMPREHENSIVE METABOLIC PANEL    CBC W/ AUTO DIFFERENTIAL    Magnesium    COMPREHENSIVE METABOLIC PANEL    CBC W/ AUTO DIFFERENTIAL    Magnesium    Ambulatory referral/consult to Pain Clinic                 Guelrine Higgins MD  Hematology Oncology

## 2022-11-29 NOTE — PLAN OF CARE
Pt completed D1 of Gemzar. Follow-up in clinic done yesterday with . Plan of care reviewed with pt. Stat CBC and CMP drawn. Labs reviewed. Per  and Jana Sierra PA-C, pt okay to proceed with tx today. VSS. Given pre-med of Zofran IVP. Gemzar infused over 30 minutes. Pt tolerated well. Received discharge instructions along with next appointments. Pt and spouse instructed to return on 12/13 at 11:45AM. Verbalized understanding. Discharged from unit in NAD.

## 2022-12-12 NOTE — TELEPHONE ENCOUNTER
Tried contacting patient in regards to scheduling an appointment with pulmonary from a referral in the system. Unable to LVM mailbox was full.

## 2022-12-12 NOTE — PROGRESS NOTES
"  PATIENT: Kashif Iverson  MRN: [id number]  DATE: 12/13/2022      Diagnosis:   1. Malignant neoplasm of unspecified part of unspecified bronchus or lung    2. Poor appetite                Chief Complaint: Chemotherapy and Follow-up        Oncologic History:    Oncologic History 1. 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 Palliative chemoradiation with carboplatin and paclitaxel; anaphylactic reaction to paclitaxel  2. Pembrolizumab  3. Pembrolizumab+ramucirumab (LungMAP trial)  4. Gemcitabine    Pathology Squamous cell carcinoma, no actionable mutations, PD-L1 5%        Subjective:    Interval History: Mr. Iverson returns for follow up.     8/30/2019 underwent bronchoscopy that showed "A partially obstructing (about 80% obstructed) mass was found in the middle portion in the left mainstem bronchus. The mass was large and bloody, circumferential, endobronchial, friable and necrotic. 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  No actionable mutations and PD-L1 5%.  9/19/21 staging PET CT showed "Hypermetabolic left lung mass concerning for primary pulmonary malignancy with metastatic disease involving the right 6th and 9th ribs as well as mediastinal and left supraclavicular lymph nodes."  Stage IV squamous cell carcinoma of the lung at diagnosis.    10/8/2019-10/21/2019 he received palliative chemoradiation for rib pain with carboplatin and paclitaxel.  He received 3 cycles of carboplatin and paclitaxel.  He developed anaphylactic reaction to paclitaxel.  The chest was treated with AP:PA fields and the right 9th rib was treated with anterior and posterior oblique fields at 300 cGy per fraction to 3000 cGy.   Lt chest 6X 300 10 / 10 3,000   Rt 9th rib 6X 300 10 / 10 3,000     10/31/2019-9/10/2020 he received pembrolizumab (completed 15 cycles, last dose 8/21/2020)  9/10/2020 PET CT showed progression of " disease.  He was then referred to Dr. Key for evaluation for clinical trials.  10/20/2020 he underwent repeat biopsy for LUNG MAP trial and was randomized to Ramucirumab + pembrolizumab.    11/17/2020 started ramucirumab+pembrolizumab.  Completed 4 cycles and then 2/10/2021 scans showed progression.    2/24/2021 he was subsequent started on gemcitabine with Dr. Cruz.  His care was transferred to Dr. Romo who continued his current regimen and now is currently under my care.   5/17/22-5/22/22 admitted for acute respiratory failure secondary to heart failure exacerbation requiring intubation.  He was extubated and diuresed successfully.  8/15/22-8/20/22 admitted for acute hypoxic respiratory failure secondary to heart failure; diuresed and discharged with supplemental oxygen        Interval hx:      Pt feels well and denied any issues at time of visit.  Pt agreeable to chemo and will continue on schedule.    Wife accompanies him at this visit.    Past Medical History:   Past Medical History:   Diagnosis Date    Combined systolic and diastolic heart failure     Hypertension     Lung cancer     NSCLC    Lung mass     left lung    Peripheral artery disease        Past Surgical HIstory:   Past Surgical History:   Procedure Laterality Date    BRONCHOSCOPY N/A 08/30/2019    Procedure: Bronchoscopy;  [facility];  Location: [facility] OR [address] [place] [address];  Service: Anesthesiology;  Laterality: N/A;    CORONARY ANGIOGRAPHY N/A 03/04/2022    Procedure: ANGIOGRAM, CORONARY ARTERY;  Surgeon: Robson Green MD;  Location: [facility] CATH LAB;  Service: Cardiology;  Laterality: N/A;    INSERTION OF TUNNELED CENTRAL VENOUS CATHETER (CVC) WITH SUBCUTANEOUS PORT         Family History:   Family History   Problem Relation Age of Onset    Diabetes Mother     Heart defect Mother     Cancer Father     Cancer Sister     No Known Problems Son        Social History:  reports that he quit smoking about 2 years ago. His smoking use  included cigarettes. He has a 25.00 pack-year smoking history. He has never used smokeless tobacco. He reports current alcohol use. He reports that he does not use drugs.    Allergies:  Review of patient's allergies indicates:  No Known Allergies    Medications:  Current Outpatient Medications   Medication Sig Dispense Refill    albuterol (VENTOLIN HFA) 90 mcg/actuation inhaler Inhale 2 puffs into the lungs every 6 (six) hours as needed for Wheezing. Rescue 18 g 11    albuterol-ipratropium (DUO-NEB) 2.5 mg-0.5 mg/3 mL nebulizer solution Inhale contents of 1 vial (3 mLs) by nebulization every 4 (four) hours as needed for Wheezing or Shortness of Breath. Rescue 90 mL 1    amLODIPine (NORVASC) 10 MG tablet Take 1 tablet (10 mg total) by mouth once daily. 90 tablet 3    apixaban (ELIQUIS) 5 mg Tab Take 1 tablet (5 mg total) by mouth 2 (two) times daily. 60 tablet 1    ascorbic acid-multivit-min (VITAMIN C ENERGY BOOSTER) 1,000 mg PwEP Take 3,000 mg by mouth once daily. Patient takes 3,000 mg per day      aspirin 81 MG Chew Take 1 tablet (81 mg total) by mouth once daily. 30 tablet 11    atorvastatin (LIPITOR) 80 MG tablet Take 1 tablet (80 mg total) by mouth once daily. 90 tablet 3    cilostazoL (PLETAL) 100 MG Tab Take 1 tablet (100 mg total) by mouth 2 (two) times daily. 60 tablet 11    clopidogreL (PLAVIX) 75 mg tablet Take 1 tablet (75 mg total) by mouth once daily. 30 tablet 11    dronabinoL (MARINOL) 5 MG capsule Take 1 capsule (5 mg total) by mouth 2 (two) times daily before meals. for 14 days 28 capsule 0    fluticasone-salmeterol diskus inhaler 100-50 mcg Inhale 1 puff into the lungs 2 (two) times daily. Controller 60 each 1    furosemide (LASIX) 40 MG tablet Take 1 tablet (40 mg total) by mouth 2 (two) times a day. 60 tablet 2    HYDROcodone-acetaminophen (NORCO) 7.5-325 mg per tablet Take 1 tablet by mouth every 4 (four) hours as needed. for pain.      polyethylene glycol (GLYCOLAX) 17 gram/dose powder Mix 1  capful (17 grams total) with a liquid and take by mouth once daily. 510 g 0    rivaroxaban (XARELTO) 20 mg Tab Take 1 tablet (20 mg total) by mouth daily with dinner or evening meal. 30 tablet 1    tiotropium (SPIRIVA) 18 mcg inhalation capsule Inhale 1 capsule (18 mcg total) into the lungs via handihaler once daily. Controller 30 capsule 1     No current facility-administered medications for this visit.     Facility-Administered Medications Ordered in Other Visits   Medication Dose Route Frequency Provider Last Rate Last Admin    heparin, porcine (PF) 100 unit/mL injection flush 500 Units  500 Units Intravenous PRN Jana Sierra PA-C   500 Units at 12/13/22 1440    sodium chloride 0.9% flush 10 mL  10 mL Intravenous PRN Jana Sierra PA-C   10 mL at 12/13/22 1440       Review of Systems   Constitutional:  Negative for activity change, appetite change, chills, diaphoresis, fatigue, fever and unexpected weight change.   HENT:  Negative for nosebleeds and trouble swallowing.    Eyes:  Negative for visual disturbance.   Respiratory:  Negative for cough, chest tightness, shortness of breath and wheezing.    Cardiovascular:  Negative for chest pain and leg swelling.   Gastrointestinal:  Negative for abdominal distention, abdominal pain, blood in stool, constipation, diarrhea, nausea and vomiting.   Endocrine: Negative for cold intolerance and heat intolerance.   Genitourinary:  Negative for difficulty urinating and dysuria.   Musculoskeletal:  Negative for arthralgias, back pain and myalgias.   Skin:  Negative for color change.   Neurological:  Negative for dizziness, weakness, light-headedness, numbness and headaches.   Hematological:  Negative for adenopathy. Bruises/bleeds easily.   Psychiatric/Behavioral:  Negative for confusion.      ECOG Performance Status:     ECOG SCORE             Objective:      Vitals:   There were no vitals filed for this visit.          BMI: There is no height or weight on file to  calculate BMI.    Physical Exam  Constitutional:       General: He is not in acute distress.     Appearance: Normal appearance. He is not ill-appearing.   HENT:      Head: Normocephalic and atraumatic.      Nose: Nose normal.      Mouth/Throat:      Pharynx: No oropharyngeal exudate or posterior oropharyngeal erythema.   Eyes:      General: No scleral icterus.     Extraocular Movements: Extraocular movements intact.      Conjunctiva/sclera: Conjunctivae normal.      Pupils: Pupils are equal, round, and reactive to light.   Cardiovascular:      Rate and Rhythm: Normal rate and regular rhythm.      Heart sounds: No murmur heard.    No friction rub. No gallop.      Comments: Right chest wall port c/d/i  Pulmonary:      Effort: Pulmonary effort is normal. No respiratory distress.      Breath sounds: No stridor. No wheezing, rhonchi or rales.   Abdominal:      General: Bowel sounds are normal. There is no distension.      Palpations: Abdomen is soft. There is no mass.      Tenderness: There is no abdominal tenderness. There is no guarding or rebound.   Musculoskeletal:         General: No swelling or tenderness. Normal range of motion.      Cervical back: Normal range of motion and neck supple.      Right lower leg: No edema.      Left lower leg: No edema.   Skin:     General: Skin is warm and dry.      Findings: No bruising.   Neurological:      General: No focal deficit present.      Mental Status: He is alert.       Laboratory Data:   Recent Results (from the past 168 hour(s))   CBC Auto Differential    Collection Time: 12/13/22 12:25 PM   Result Value Ref Range    WBC 9.92 3.90 - 12.70 K/uL    RBC 4.02 (L) 4.60 - 6.20 M/uL    Hemoglobin 12.2 (L) 14.0 - 18.0 g/dL    Hematocrit 35.4 (L) 40.0 - 54.0 %    MCV 88 82 - 98 fL    MCH 30.3 27.0 - 31.0 pg    MCHC 34.5 32.0 - 36.0 g/dL    RDW 15.4 (H) 11.5 - 14.5 %    Platelets 345 150 - 450 K/uL    MPV 9.4 9.2 - 12.9 fL    Immature Granulocytes 0.3 0.0 - 0.5 %    Gran # (ANC)  7.4 1.8 - 7.7 K/uL    Immature Grans (Abs) 0.03 0.00 - 0.04 K/uL    Lymph # 0.9 (L) 1.0 - 4.8 K/uL    Mono # 1.4 (H) 0.3 - 1.0 K/uL    Eos # 0.1 0.0 - 0.5 K/uL    Baso # 0.04 0.00 - 0.20 K/uL    nRBC 0 0 /100 WBC    Gran % 74.9 (H) 38.0 - 73.0 %    Lymph % 9.1 (L) 18.0 - 48.0 %    Mono % 14.5 4.0 - 15.0 %    Eosinophil % 0.8 0.0 - 8.0 %    Basophil % 0.4 0.0 - 1.9 %    Differential Method Automated    Comprehensive Metabolic Panel    Collection Time: 12/13/22 12:25 PM   Result Value Ref Range    Sodium 140 136 - 145 mmol/L    Potassium 2.9 (L) 3.5 - 5.1 mmol/L    Chloride 99 95 - 110 mmol/L    CO2 31 (H) 23 - 29 mmol/L    Glucose 123 (H) 70 - 110 mg/dL    BUN 9 8 - 23 mg/dL    Creatinine 0.8 0.5 - 1.4 mg/dL    Calcium 9.1 8.7 - 10.5 mg/dL    Total Protein 6.8 6.0 - 8.4 g/dL    Albumin 3.4 (L) 3.5 - 5.2 g/dL    Total Bilirubin 0.8 0.1 - 1.0 mg/dL    Alkaline Phosphatase 108 55 - 135 U/L    AST 13 10 - 40 U/L    ALT 8 (L) 10 - 44 U/L    Anion Gap 10 8 - 16 mmol/L    eGFR >60 >60 mL/min/1.73 m^2   Magnesium    Collection Time: 12/13/22 12:25 PM   Result Value Ref Range    Magnesium 1.5 (L) 1.6 - 2.6 mg/dL               Imaging:       CT Chest Abdomen Pelvis With Contrast (xpd)    Result Date: 8/25/2022  EXAMINATION: CT CHEST ABDOMEN PELVIS WITH CONTRAST (XPD) CLINICAL HISTORY: metastatic lung cancer on gemcitabine, eval response; Malignant neoplasm of left main bronchus TECHNIQUE: Low dose axial images, sagittal and coronal reformations were obtained from the thoracic inlet to the pubic symphysis following the IV administration of 85 mL of Omnipaque 350 and the oral administration of 450 ml of Readi-Cat barium suspension. COMPARISON: 05/26/2022. FINDINGS: There is a right internal jugular Port-A-Cath present with tip located within the right atrium.  There is a left supraclavicular soft tissue density nodule measuring approximately 3.0 x 1.5 cm (image 16, series 2), not significantly changed.  The heart is not  enlarged.  There is a moderate amount of atherosclerotic calcification identified within the coronary arteries in a multi-vessel distribution.  Atherosclerotic calcification is also present within the thoracic aorta which is normal in caliber without evidence for aortic dissection or aneurysmal dilatation.  No hilar or mediastinal adenopathy is identified.  No axillary adenopathy is identified.  Once again, there is occlusion of the left upper lobe bronchus with band of soft tissue density within the left upper lobe extending from the hilum much of which represents atelectasis and parenchymal scarring.  There is also a more nodular opacity within the left upper lobe medially measuring 2.1 x 1.5 cm (image 154, series 6) compared to 1.7 x 1.1 cm on the prior examination.  This is worrisome for enlarging malignancy.  There is also a wedge-shaped opacity within the superior left lower lobe which is unchanged and likely represents an area of atelectasis/scarring.  There is a stable 5 mm pulmonary nodule within the left lower lobe laterally (image 312, series 6).  No new nodules are identified.  There are moderate centrilobular emphysematous changes.  Bony structures appear intact.  There is no evidence for acute fracture or bone destruction. The liver is normal in size and is homogeneous in density with no focal liver lesions identified.  The gallbladder is contracted.  There is no evidence for intrahepatic or extrahepatic biliary dilatation.  The portal venous system is patent.  The spleen and stomach appear unremarkable.  There are a few pancreatic calcifications present which may be related to chronic pancreatitis.  No focal pancreatic lesions are identified.  The adrenal glands are not enlarged.  The kidneys are normal in size and there is no evidence for abnormal renal masses or hydronephrosis.  The kidneys are noted to concentrate contrast symmetrically.  Atherosclerotic calcification is present within the  "abdominal aorta which demonstrates ectasia without aneurysmal dilatation.  No para-aortic lymphadenopathy is identified.  The appendix is present and appears unremarkable.  No dilated loops of bowel are evident.  The urinary bladder appears grossly unremarkable.  There are multiple prostatic calcifications present. There is no evidence for pelvic or inguinal lymphadenopathy.     Enlarging nodule within the left upper lobe medially now measuring 2.1 x 1.5 cm compared to 1.7 x 1.1 cm on prior examination dated 05/26/2022.  This is worrisome for malignancy. Left supraclavicular soft tissue density nodule measuring 3.0 x 1.5 cm, not significantly changed..  This is an area of prior biopsy proven malignancy. Occlusion of the left upper lobe bronchus with probable atelectasis/scarring within the left upper lobe extending from the hilum, not significantly changed.  There is also a wedge-shaped opacity within the superior left lower lobe which is also unchanged and likely represents an area of atelectasis/scarring. 5mm pulmonary nodule within the left lower lobe laterally, stable dating back to 11/06/2020. Moderate centrilobular emphysema. Electronically signed by: Rayshawn Degroot MD Date:    08/25/2022 Time:    13:21    Assessment/Plan:         ECOG 1    Lung cancer, main bronchus, left - Primary (Chronic)  8/30/2019 underwent bronchoscopy that showed "A partially obstructing (about 80% obstructed) mass was found in the middle portion in the left mainstem bronchus. The mass was large and bloody, circumferential, endobronchial, friable and necrotic. 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  No actionable mutations and PD-L1 5%.  9/19/21 staging PET CT showed "Hypermetabolic left lung mass concerning for primary pulmonary malignancy with metastatic disease involving the right 6th and 9th ribs as well as mediastinal and left supraclavicular lymph " "nodes."  Stage IV squamous cell carcinoma of the lung at diagnosis.     10/8/2019-10/21/2019 he received palliative chemoradiation for rib pain with carboplatin and paclitaxel.  He received 3 cycles of carboplatin and paclitaxel.  He developed anaphylactic reaction to paclitaxel.  The chest was treated with AP:PA fields and the right 9th rib was treated with anterior and posterior oblique fields at 300 cGy per fraction to 3000 cGy.   Lt chest 6X 300 10 / 10 3,000   Rt 9th rib 6X 300 10 / 10 3,000      10/31/2019-9/10/2020 he received pembrolizumab (completed 15 cycles, last dose 8/21/2020)  9/10/2020 PET CT showed progression of disease.  He was then referred to Dr. Key for evaluation for clinical trials.  10/20/2020 he underwent repeat biopsy for LUNG MAP trial and was randomized to Ramucirumab + pembrolizumab.     11/17/2020 started ramucirumab+pembrolizumab.  Completed 4 cycles and then 2/10/2021 scans showed progression.     2/24/2021 he was subsequent started on gemcitabine with Dr. Cruz.    -Treated in 9/22 with XRT for OPAL lesion        A/P 12/12/22  -Scans stable and reviewed with pt and wife  -Resume chemo, labs for tomorrow and give chemo if ok.          Chronic combined systolic and diastolic heart failure (Chronic)/Chronic hypoxia  -Stable      Left shoulder pain  -chronic  -No signs of cancer of fracture on Xray  -Trial of tramadol and referral to pain clinic placed      Problem List Items Addressed This Visit    None  Visit Diagnoses       Malignant neoplasm of unspecified part of unspecified bronchus or lung    -  Primary    Relevant Orders    CBC W/ AUTO DIFFERENTIAL    COMPREHENSIVE METABOLIC PANEL    Magnesium    Poor appetite        Relevant Medications    dronabinoL (MARINOL) 5 MG capsule            Orders Placed This Encounter   Procedures    CBC W/ AUTO DIFFERENTIAL    COMPREHENSIVE METABOLIC PANEL    Magnesium                 Guerline Higgins MD  Hematology Oncology                          "

## 2022-12-13 NOTE — PLAN OF CARE
Pt arrived to unit for stat labs and D15 of Gemzar. Follow-up in clinic completed with  yesterday. Per pt's wife, pt had a panic attack while in the shower this morning. Started having shortness of breath. Pt does wear 2L O2 by NC at home. VSS in clinic today. Stat labs done and reviewed. Pt received additional 2g mag and 20 mEq KCL. 8mg Zofran IVP given. Gemzar infused over 30 minutes. Pt tolerated well. Will return in 2 weeks for repeat labs and chemo. Discharged from unit in wheelchair accompanied by spouse.
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
## 2022-12-16 NOTE — H&P
[facility]  [facility] Medicine  History & Physical    Patient Name: Kashif Iverson  MRN: [id number]  Patient Class: IP- Inpatient  Admission Date: 12/16/2022  Attending Physician: Kayla Coleman DO   Primary Care Provider: Eduardo Ruiz MD         Patient information was obtained from patient and ER records.     Subjective:     Principal Problem:Acute on chronic respiratory failure with hypoxia    Chief Complaint:   Chief Complaint   Patient presents with    Shortness of Breath     Since this afternoon with pulse ox in the 70's and 80's        HPI: This is a 61 year old male with a PMHx of SCC of the lung with metastasis to the bone (on maintenance gemcitabine and radiation, s/p chemoradiation and immunotherapy), COPD/bronchiestasis (on 2L O2), Afib (on Eliquis), HTN, HFpEF (EF: 65%), CVA, CAD, PAD who presented for shortness of breath.     Patient reports developing worsening shortness of breath and productive cough since he took his last chemotherapy dose on 12/13. He is on 2L O2 at home, and SpO2 readings were as low as 80%, and would not improved despite increasing the O2 to 4L. He has multiple admissions for similar presentation. No longer a smoker. In the ED, the patient was tachycardic, tachypnic and hypoxic requiring 12L O2. Labs showed leukocytosis (16.9), elevated d-dimer (1.94), hypokalemia (3.4), elevated BNP (355), elevated troponin (0.089), elevated lactic acid (2.3). CTA showed no PE, presistent and worsening occlusion of left upper lobe bronchus with superimposed interstitial and alveolar infiltrates., increased ground glass opacities, moderate to large right pleural effusion, small left pleural effusion and other chronic stable changes.  . He was given fluids, vancomycin, zosyn, and DuoNeb x1. The patient was admitted for further management.       Past Medical History:   Diagnosis Date    Combined systolic and diastolic heart failure     Hypertension     Lung cancer     NSCLC    Lung  mass     left lung    Peripheral artery disease        Past Surgical History:   Procedure Laterality Date    BRONCHOSCOPY N/A 08/30/2019    Procedure: Bronchoscopy;  Surgeon: Miguel Diagnostic Provider;  Location: [facility] OR [address] [place] [address];  Service: Anesthesiology;  Laterality: N/A;    CORONARY ANGIOGRAPHY N/A 03/04/2022    Procedure: ANGIOGRAM, CORONARY ARTERY;  Surgeon: Robson Green MD;  Location: [facility] CATH LAB;  Service: Cardiology;  Laterality: N/A;    INSERTION OF TUNNELED CENTRAL VENOUS CATHETER (CVC) WITH SUBCUTANEOUS PORT         Review of patient's allergies indicates:  No Known Allergies    No current facility-administered medications on file prior to encounter.     Current Outpatient Medications on File Prior to Encounter   Medication Sig    albuterol (VENTOLIN HFA) 90 mcg/actuation inhaler Inhale 2 puffs into the lungs every 6 (six) hours as needed for Wheezing. Rescue    albuterol-ipratropium (DUO-NEB) 2.5 mg-0.5 mg/3 mL nebulizer solution Inhale contents of 1 vial (3 mLs) by nebulization every 4 (four) hours as needed for Wheezing or Shortness of Breath. Rescue    amLODIPine (NORVASC) 10 MG tablet Take 1 tablet (10 mg total) by mouth once daily.    apixaban (ELIQUIS) 5 mg Tab Take 1 tablet (5 mg total) by mouth 2 (two) times daily.    ascorbic acid-multivit-min (VITAMIN C ENERGY BOOSTER) 1,000 mg PwEP Take 3,000 mg by mouth once daily. Patient takes 3,000 mg per day    aspirin 81 MG Chew Take 1 tablet (81 mg total) by mouth once daily.    atorvastatin (LIPITOR) 80 MG tablet Take 1 tablet (80 mg total) by mouth once daily.    cilostazoL (PLETAL) 100 MG Tab Take 1 tablet (100 mg total) by mouth 2 (two) times daily.    clopidogreL (PLAVIX) 75 mg tablet Take 1 tablet (75 mg total) by mouth once daily.    dronabinoL (MARINOL) 5 MG capsule Take 1 capsule (5 mg total) by mouth 2 (two) times daily before meals. for 14 days    fluticasone-salmeterol diskus inhaler 100-50 mcg Inhale 1 puff into the lungs  2 (two) times daily. Controller    furosemide (LASIX) 40 MG tablet Take 1 tablet (40 mg total) by mouth 2 (two) times a day.    HYDROcodone-acetaminophen (NORCO) 7.5-325 mg per tablet Take 1 tablet by mouth every 4 (four) hours as needed. for pain.    polyethylene glycol (GLYCOLAX) 17 gram/dose powder Mix 1 capful (17 grams total) with a liquid and take by mouth once daily.    rivaroxaban (XARELTO) 20 mg Tab Take 1 tablet (20 mg total) by mouth daily with dinner or evening meal.    tiotropium (SPIRIVA) 18 mcg inhalation capsule Inhale 1 capsule (18 mcg total) into the lungs via handihaler once daily. Controller     Family History       Problem Relation (Age of Onset)    Cancer Father, Sister    Diabetes Mother    Heart defect Mother    No Known Problems Son          Tobacco Use    Smoking status: Former     Packs/day: 1.00     Years: 25.00     Pack years: 25.00     Types: Cigarettes     Quit date:      Years since quittin.9    Smokeless tobacco: Never   Substance and Sexual Activity    Alcohol use: Yes     Comment: 11/3/22: Occ.    Drug use: Never    Sexual activity: Yes     Partners: Female     Review of Systems   Constitutional: Negative.    HENT: Negative.     Eyes: Negative.    Respiratory:  Positive for cough and shortness of breath.    Cardiovascular: Negative.    Gastrointestinal: Negative.    Endocrine: Negative.    Genitourinary: Negative.    Musculoskeletal: Negative.    Skin: Negative.    Allergic/Immunologic: Negative.    Neurological: Negative.    Psychiatric/Behavioral: Negative.     Objective:     Vital Signs (Most Recent):  Temp: 98.6 °F (37 °C) (22 0140)  Pulse: 102 (22 0354)  Resp: (!) 31 (22 0354)  BP: 129/69 (22 0354)  SpO2: 95 % (22 0354)   Vital Signs (24h Range):  Temp:  [98.2 °F (36.8 °C)-98.6 °F (37 °C)] 98.6 °F (37 °C)  Pulse:  [] 102  Resp:  [20-31] 31  SpO2:  [86 %-100 %] 95 %  BP: (117-146)/(69-81) 129/69     Weight: 87.5 kg (193  lb)  Body mass index is 27.69 kg/m².    Physical Exam  Vitals and nursing note reviewed.   Constitutional:       General: He is not in acute distress.     Appearance: Normal appearance. He is not ill-appearing.   HENT:      Head: Normocephalic and atraumatic.      Nose: Nose normal.      Mouth/Throat:      Mouth: Mucous membranes are moist.   Eyes:      Extraocular Movements: Extraocular movements intact.   Cardiovascular:      Rate and Rhythm: Normal rate.      Pulses: Normal pulses.      Heart sounds: No murmur heard.  Pulmonary:      Effort: Respiratory distress present.      Comments: Diffusely rhodochrous with faint expiratory wheezes.   Abdominal:      General: Abdomen is flat.      Palpations: Abdomen is soft.      Tenderness: There is no abdominal tenderness.   Musculoskeletal:      Right lower leg: No edema.      Left lower leg: No edema.   Skin:     General: Skin is warm.      Capillary Refill: Capillary refill takes less than 2 seconds.   Neurological:      General: No focal deficit present.      Mental Status: He is alert.   Psychiatric:         Mood and Affect: Mood normal.           Significant Labs: All pertinent labs within the past 24 hours have been reviewed.    Significant Imaging: I have reviewed all pertinent imaging results/findings within the past 24 hours.    Assessment/Plan:     * Acute on chronic respiratory failure with hypoxia  Patient with Hypoxic Respiratory failure which is Acute on chronic.  he is on home oxygen at 2 LPM. Supplemental oxygen was provided and noted- Oxygen Concentration (%):  [100] 100.   Signs/symptoms of respiratory failure include- increased work of breathing and respiratory distress. Contributing diagnoses includes - COPD and Pneumonia Labs and images were reviewed. Patient Has recent ABG, which has been reviewed. Will treat underlying causes and adjust management of respiratory failure as follows- see plans for HAP & COPD exacerbation       Persistent atrial  fibrillation  [id number] Score: 1.  Resume home medications     Chronic obstructive pulmonary disease with acute exacerbation  - History of COPD on supplemental O2, 2L NC at home   - Presented with worsening SOB   - Likely related to a COPD exacerbation in the setting of pneumonia      Plan:   - Continue steroids.  - Duo-Nebs scheduled  - Wean O2 as tolerated    Coronary artery disease involving native coronary artery of native heart without angina pectoris  Denies chest pain  Resume aspirin, plavix, and statin      PAD (peripheral artery disease)  Continue home medications       Chronic diastolic heart failure  -Echocardiogram (2/22/2022): EF: 35%-40%, GIDD, normal RV systolic function, mild TR   -Echocardiogram (5/18/2022): EF: 65%, indeterminate LV diastolic function, mild TR, PA pressure of 54 mmHg  -Echocardiogram (9/30/2022): EF: 60%, mildly reduced RV systolic function, PA pressure of 40 mmHg.   -Elevated BNP, around baseline   -Doubt exacerbation   -Resume home Lasix       Essential hypertension  Resume home medications       Lung cancer, main bronchus, left  On maintenance chemotherapy and radiation   Outpatient follow up with palliative care/oncology       HAP (hospital-acquired pneumonia)  Presented with SOB, cough   CTA showed no PE, presistent and worsening occlusion of left upper lobe bronchus with superimposed interstitial and alveolar infiltrates., increased ground glass opacities, moderate to large right pleural effusion, small left pleural effusion and other chronic stable change.   Concern for HAP/post obstructive pneumonia    Plan:   - Vancomycin, zosyn and doxycyline   - Respiratory cultures   - Wean O2 as tolerated       VTE Risk Mitigation (From admission, onward)         Ordered     apixaban tablet 5 mg  2 times daily         12/16/22 0435     IP VTE HIGH RISK PATIENT  Once         12/16/22 0435     Place sequential compression device  Until discontinued         12/16/22 0435                    David Marroquin MD  Department of Hospital Medicine   [facility] [place]

## 2022-12-16 NOTE — ED TRIAGE NOTES
Pt present to ED via personal transportation for low SPO2 in the 70s-80s. Uses 2L NC at home  Pt reports level 4 lung cancer (receive chemo therapy Q Tues, skip the 3rd Tues for MD bray). Pt denies any pain (chest, abdominal).  Pt denies N/V/D. Pt reports Hx COPD. Quit smoking 3 years ago.  Pt AAOX4

## 2022-12-16 NOTE — ED PROVIDER NOTES
Encounter Date: 12/16/2022    SCRIBE #1 NOTE: I, Rocco Adorno, am scribing for, and in the presence of,  Ilia James MD. I have scribed the following portions of the note - Other sections scribed: HPI, ROS, PE.   SCRIBE #2 NOTE: IJude, am scribing for, and in the presence of,  Ilia James MD. I have scribed the remaining portions of the note not scribed by Scribe #1.   History     Chief Complaint   Patient presents with    Shortness of Breath     Since this afternoon with pulse ox in the 70's and 80's     Kashif Iverson is a 62 y.o. male, with a PMHx of Lung Cancer, Hypertension, and Combined systolic and diastolic heart failure, who presents to the ED with SOB beginning yesterday afternoon. The patient's wife reports that the patient is normally on 2L of oxygen, but she noticed that his SpO2 was at 80%, after which she increased it to 3L with an increase in his SpO2 to 85%, but continued to a rapid decrease to 75% as time elapsed. Patient further reports a productive cough. Compliant with albuterol which he endorses taking today with no immediate relief noted. No other medications taken PTA. No other exacerbating or alleviating factors. Patient denies hematuria, nausea, and vomiting. or other associated symptoms.Wife reports that he was admitted to the ED approximately three weeks ago due to a simililar complaints. Patient is a former smoker, endorsing he quit 3 years ago.  Wife endorses the patient underwent chemotherapy on Tuesday.     The history is provided by the patient and the spouse. No  was used.   Review of patient's allergies indicates:  No Known Allergies  Past Medical History:   Diagnosis Date    Combined systolic and diastolic heart failure     Hypertension     Lung cancer     NSCLC    Lung mass     left lung    Peripheral artery disease      Past Surgical History:   Procedure Laterality Date    BRONCHOSCOPY N/A 08/30/2019    Procedure: Bronchoscopy;   Surgeon: Miguel Diagnostic Provider;  Location: [facility] OR [address] [place] [address];  Service: Anesthesiology;  Laterality: N/A;    CORONARY ANGIOGRAPHY N/A 2022    Procedure: ANGIOGRAM, CORONARY ARTERY;  Surgeon: Robson Green MD;  Location: [facility] CATH LAB;  Service: Cardiology;  Laterality: N/A;    INSERTION OF TUNNELED CENTRAL VENOUS CATHETER (CVC) WITH SUBCUTANEOUS PORT       Family History   Problem Relation Age of Onset    Diabetes Mother     Heart defect Mother     Cancer Father     Cancer Sister     No Known Problems Son      Social History     Tobacco Use    Smoking status: Former     Packs/day: 1.00     Years: 25.00     Pack years: 25.00     Types: Cigarettes     Quit date:      Years since quittin.9    Smokeless tobacco: Never   Substance Use Topics    Alcohol use: Yes     Comment: 11/3/22: Occ.    Drug use: Never     Review of Systems   Constitutional:  Negative for chills and fever.   HENT:  Negative for facial swelling and sore throat.    Eyes:  Negative for visual disturbance.   Respiratory:  Positive for cough (productive) and shortness of breath.    Cardiovascular:  Negative for chest pain and palpitations.   Gastrointestinal:  Negative for abdominal pain, nausea and vomiting.   Genitourinary:  Negative for dysuria and hematuria.   Musculoskeletal:  Negative for back pain.   Skin:  Negative for rash.   Neurological:  Negative for weakness and headaches.   Hematological:  Does not bruise/bleed easily.   Psychiatric/Behavioral: Negative.       Physical Exam     Initial Vitals [22 0010]   BP Pulse Resp Temp SpO2   (!) 146/79 101 20 98.2 °F (36.8 °C) (!) 89 %      MAP       --         Physical Exam    Nursing note and vitals reviewed.  Constitutional: He appears well-developed and well-nourished. He is not diaphoretic. No distress.   HENT:   Head: Normocephalic and atraumatic.   Right Ear: External ear normal.   Left Ear: External ear normal.   Nose: Nose normal.   Eyes: Conjunctivae are normal. No  scleral icterus.   Neck: Neck supple. No tracheal deviation present.   Cardiovascular:  Regular rhythm and normal heart sounds.   Tachycardia present.   Exam reveals no gallop and no friction rub.       No murmur heard.  Pulses:       Radial pulses are 2+ on the right side and 2+ on the left side.        Dorsalis pedis pulses are 2+ on the right side and 2+ on the left side.   Pulmonary/Chest: No respiratory distress.   Rhonchi in the posterior bilateral lung fields noted. Speaking in full sentences. Patient was hypoxic on 5L O2 at 85%. 100% SpO2 on non-rebreather.    Abdominal: Abdomen is soft. Bowel sounds are normal. There is no abdominal tenderness.   Musculoskeletal:      Cervical back: Neck supple.     Neurological: He is alert and oriented to person, place, and time. GCS score is 15. GCS eye subscore is 4. GCS verbal subscore is 5. GCS motor subscore is 6.   Skin: Skin is warm and dry.   Psychiatric: He has a normal mood and affect. Thought content normal.       ED Course   Critical Care    Date/Time: 12/16/2022 4:22 AM  Performed by: Ilia James MD  Authorized by: Kalya Coleman DO   Direct patient critical care time: 35 minutes  Additional history critical care time: 5 minutes  Ordering / reviewing critical care time: 5 minutes  Documentation critical care time: 5 minutes  Consulting other physicians critical care time: 5 minutes  Total critical care time (exclusive of procedural time) : 55 minutes  Critical care was necessary to treat or prevent imminent or life-threatening deterioration of the following conditions: respiratory failure.  Critical care was time spent personally by me on the following activities: review of old charts, re-evaluation of patient's condition, pulse oximetry, ordering and review of radiographic studies, ordering and review of laboratory studies, ordering and performing treatments and interventions, obtaining history from patient or surrogate, examination of patient,  evaluation of patient's response to treatment, discussions with primary provider, discussions with consultants and development of treatment plan with patient or surrogate.      Labs Reviewed   CBC W/ AUTO DIFFERENTIAL - Abnormal; Notable for the following components:       Result Value    WBC 16.93 (*)     RBC 3.90 (*)     Hemoglobin 11.7 (*)     Hematocrit 35.1 (*)     RDW 15.2 (*)     Gran # (ANC) 15.8 (*)     Immature Grans (Abs) 0.06 (*)     Lymph # 0.7 (*)     Gran % 93.4 (*)     Lymph % 4.2 (*)     Mono % 1.8 (*)     All other components within normal limits   COMPREHENSIVE METABOLIC PANEL - Abnormal; Notable for the following components:    Potassium 3.4 (*)     Glucose 199 (*)     Calcium 8.6 (*)     Albumin 3.0 (*)     Total Bilirubin 1.6 (*)     ALT 8 (*)     All other components within normal limits   LACTIC ACID, PLASMA - Abnormal; Notable for the following components:    Lactate (Lactic Acid) 2.3 (*)     All other components within normal limits   TROPONIN I - Abnormal; Notable for the following components:    Troponin I 0.089 (*)     All other components within normal limits   B-TYPE NATRIURETIC PEPTIDE - Abnormal; Notable for the following components:     (*)     All other components within normal limits   D DIMER, QUANTITATIVE - Abnormal; Notable for the following components:    D-Dimer 1.94 (*)     All other components within normal limits   URINALYSIS, REFLEX TO URINE CULTURE - Abnormal; Notable for the following components:    Protein, UA Trace (*)     Occult Blood UA 1+ (*)     All other components within normal limits    Narrative:     Specimen Source->Urine   URINALYSIS MICROSCOPIC - Abnormal; Notable for the following components:    RBC, UA 10 (*)     All other components within normal limits    Narrative:     Specimen Source->Urine   ISTAT PROCEDURE - Abnormal; Notable for the following components:    POC PH 7.513 (*)     POC PCO2 33.8 (*)     POC PO2 124 (*)     POC TCO2 28 (*)      All other components within normal limits   CULTURE, BLOOD   CULTURE, BLOOD   LACTIC ACID, PLASMA   C-REACTIVE PROTEIN   PROCALCITONIN   TROPONIN I   SARS-COV-2 RDRP GENE   POCT INFLUENZA A/B MOLECULAR          Imaging Results               CTA Chest Non-Coronary (PE Studies) (Final result)  Result time 12/16/22 03:55:50      Final result by Jordin Sofia MD (12/16/22 03:55:50)                   Impression:      There is no large central saddle type pulmonary embolus, when accounting for limitations of the examination there is no additional evidence for pulmonary embolus on this exam.    Persistent abnormal appearance of the left upper lobe with suspected occlusion of the left upper lobe bronchus, and volume loss of the left upper lobe however there is progression of abnormality, with increased pattern of interstitial and alveolar opacity, which may relate to superimposed interstitial and alveolar infiltrates/airspace disease and consolidative change and volume loss.    The lungs bilaterally demonstrate appearance of increased interstitial and ground-glass opacity with additional areas of confluent infiltrates/airspace disease having developed at the left lower lobe inferiorly.    Previously identified opacity that may relate to chronic and consolidative change of the left upper lobe posterior medially appears similar to the prior study.    Mild opacity within the trachea layering posteriorly superior to the jonnathan may relate to secretions or aspiration.  Bilateral peribronchial thickening is also noted.    Moderate to large right pleural effusion, small left pleural effusion.  Small pericardial effusion.    Left supraclavicular lesion again noted, not well evaluated on this examination as discussed above, similarly the previously identified left upper lobe nodule is obscured due to the findings of the left upper lobe at this time.    Additional findings as above.    This report was flagged in Epic as  abnormal.      Electronically signed by: Jordin Sofia  Date:    12/16/2022  Time:    03:55               Narrative:    EXAMINATION:  CTA CHEST NON CORONARY (PE STUDIES)    CLINICAL HISTORY:  Pulmonary embolism (PE) suspected, positive D-dimer;    TECHNIQUE:  Low dose axial images, sagittal and coronal reformations were obtained from the thoracic inlet to the lung bases following the IV administration of 75 mL of Omnipaque 350.  Contrast timing was optimized to evaluate the pulmonary arteries.  MIP images were performed.    COMPARISON:  Prior examinations, most recent of which is CT examination of the chest November 18, 2022    FINDINGS:  There is no large central saddle type pulmonary embolus.  When accounting for artifact the pulmonary arterial vascular demonstrate opacification without evidence for abnormal pattern of pulmonary arterial filling defects specific for pulmonary emboli.    There is a right-sided central venous catheter noted, the distal tip extends to the SVC.  Better demonstrated on the prior examination there is a supraclavicular/retroclavicular soft tissue lesion, consistent with a soft tissue mass or enlarged adenopathy, this is difficult to accurately measure on this exam, as the examination was optimized for evaluation of the pulmonary arterial vasculature rather than soft tissues.    There is a moderate to large pleural effusion on the right, and a small pleural effusion on the left, this is seen as an interval change.  There is also a small pericardial effusion.  The thoracic aorta demonstrates appropriate opacification.  Atherosclerotic change noted.    The lungs demonstrate chronic change with emphysematous change noted.  The right hemithorax demonstrates appearance of interstitial infiltrate/edema and ground-glass infiltrate throughout the right hemithorax, there is atelectasis noted, most notably at the right lower lobe, associated with the aforementioned pleural effusion.    As  previously noted there is appearance thought to represent occlusion of the left upper lobe bronchus.  There is volume loss of the left upper lobe, there is increased pattern of interstitial as well as alveolar opacity, prominent confluent opacity may relate to components of consolidation, and volume loss.  The previously identified nodule at the left upper lobe medially is obscured by the aforementioned.  The left lower lobe demonstrates interstitial edema/infiltrate, as well as a diffuse pattern of ground-glass infiltrate.  There is an area of superimposed opacity consistent with consolidative change posteriorly medially appearing similar to the prior examination.  There is additional appearance of confluent infiltrates/airspace disease at the left lung base.    There is mild opacity layering posteriorly within the trachea just above the level of the jonnathan this may relate to secretions or aspiration.  There is peribronchial thickening bilaterally most notably at the lung bases.  There is no evidence for pneumothorax.    Limited imaging of the upper abdomen demonstrates no evidence for acute upper abdominal process.  The visualized osseous structures demonstrate chronic change.                                       X-Ray Chest 1 View (Final result)  Result time 12/16/22 01:02:44      Final result by Luis Enrique Schreoder MD (12/16/22 01:02:44)                   Impression:      Stable pleural and parenchymal opacities and Port-A-Cath with no acute findings radiographically.      Electronically signed by: Luis Enrique Schoreder  Date:    12/16/2022  Time:    01:02               Narrative:    EXAMINATION:  XR CHEST 1 VIEW    CLINICAL HISTORY:  shortness of breath;    TECHNIQUE:  Single frontal view of the chest was performed.    COMPARISON:  11/03/2002    FINDINGS:  Pleural and parenchymal opacities do not appear significantly changed.  Port-A-Cath remains in position.  Bones remain intact.  The heart mediastinal contours are  stable.                                       Medications   piperacillin-tazobactam 4.5 g in dextrose 5 % 100 mL IVPB (ready to mix system) (0 g Intravenous Stopped 12/16/22 0326)   vancomycin (VANCOCIN) 2,000 mg in dextrose 5 % 500 mL IVPB (has no administration in time range)   albuterol-ipratropium 2.5 mg-0.5 mg/3 mL nebulizer solution 3 mL (3 mLs Nebulization Given 12/16/22 0056)   sodium chloride 0.9% bolus 500 mL 500 mL (500 mLs Intravenous New Bag 12/16/22 0219)   iohexoL (OMNIPAQUE 350) injection 75 mL (75 mLs Intravenous Given 12/16/22 0307)     Medical Decision Making:   History:   Old Medical Records: I decided to obtain old medical records.  Independently Interpreted Test(s):   I have ordered and independently interpreted EKG Reading(s) - see summary below  Clinical Tests:   Lab Tests: Ordered and Reviewed  Radiological Study: Ordered and Reviewed  Medical Tests: Ordered and Reviewed        Scribe Attestation:   Scribe #1: I performed the above scribed service and the documentation accurately describes the services I performed. I attest to the accuracy of the note.  Scribe #2: I performed the above scribed service and the documentation accurately describes the services I performed. I attest to the accuracy of the note.      ED Course as of 12/16/22 0422   Fri Dec 16, 2022   0132 X-Ray Chest 1 View  Impression:     Stable pleural and parenchymal opacities and Port-A-Cath with no acute findings radiographically. [CC]   0159 EKG: Rate 101, regular rhythm, sinus rhythm, occasional PVC, incomplete right bundle-branch block, intervals within normal limits, no ST elevations noted, ST depressions noted in lateral leads V4 through V6. [CC]   0417 CTA Chest Non-Coronary (PE Studies)(!)  Impression:     There is no large central saddle type pulmonary embolus, when accounting for limitations of the examination there is no additional evidence for pulmonary embolus on this exam.     Persistent abnormal appearance of  the left upper lobe with suspected occlusion of the left upper lobe bronchus, and volume loss of the left upper lobe however there is progression of abnormality, with increased pattern of interstitial and alveolar opacity, which may relate to superimposed interstitial and alveolar infiltrates/airspace disease and consolidative change and volume loss.     The lungs bilaterally demonstrate appearance of increased interstitial and ground-glass opacity with additional areas of confluent infiltrates/airspace disease having developed at the left lower lobe inferiorly.     Previously identified opacity that may relate to chronic and consolidative change of the left upper lobe posterior medially appears similar to the prior study.     Mild opacity within the trachea layering posteriorly superior to the jonnathan may relate to secretions or aspiration.  Bilateral peribronchial thickening is also noted.     Moderate to large right pleural effusion, small left pleural effusion.  Small pericardial effusion.     Left supraclavicular lesion again noted, not well evaluated on this examination as discussed above, similarly the previously identified left upper lobe nodule is obscured due to the findings of the left upper lobe at this time.     Additional findings as above.     This report was flagged in Epic as abnormal. [CC]   7204 Patient is a 62-year-old male presenting to the emergency department for evaluation of hypoxia at home on his home O2.  Oxygenation improved with non-rebreather.  I have ordered high-flow nasal cannula.  Patient is a lung cancer patient.  Concern for possible sepsis given leukocytosis findings of pneumonia on CTA.  Patient is afebrile.  I have covered him empirically with antibiotics, broad spectrum.  Lactic 2.3.  Fluid resuscitated.  Patient found to have a large pleural effusion noted on CTA.  This will need to be evaluated.  Possible aspirations noted on CTA.  Findings related to patient's lung cancer as  well on CTA.  No PE noted.  Did have an elevated D-dimer.  This was done given patient's cancer diagnosis.  Blood cultures pending.  UA is not indicative of UTI.  Mildly hyperglycemic.  Again fluid resuscitated.  Potassium 3.4 calcium 8.6, other electrolytes within normal limits.  BNP slightly elevated.  No pulmonary edema noted on chest x-ray or CTA.  Doubt CHF.  Patient will need further evaluation including echo to make determination.  Slightly elevated troponin will need ACS rule out.  Slight depressions in the lateral leads noted on EKG.  He denies chest pain.  I have consulted hospitalist for admission.  Plan for admission. [CC]      ED Course User Index  [CC] Ilia James MD                 Clinical Impression:   Final diagnoses:  [R06.02] Shortness of breath  [J18.9] Pneumonia of both lungs due to infectious organism, unspecified part of lung (Primary)  [R65.10] SIRS (systemic inflammatory response syndrome)  [J90] Pleural effusion  [R77.8] Elevated troponin     I, Ilia James MD, personally performed the services described in this documentation. All medical record entries made by the scribe were at my direction and in my presence. I have reviewed the chart and agree that the record reflects my personal performance and is accurate and complete.    ED Disposition Condition    Admit Stable                Ilia James MD  12/16/22 2331

## 2022-12-16 NOTE — ASSESSMENT & PLAN NOTE
Presented with SOB, cough   CTA showed no PE, presistent and worsening occlusion of left upper lobe bronchus with superimposed interstitial and alveolar infiltrates., increased ground glass opacities, moderate to large right pleural effusion, small left pleural effusion and other chronic stable change.   Concern for HAP/post obstructive pneumonia    Plan:   - Vancomycin, zosyn and doxycyline   - Respiratory cultures   - Wean O2 as tolerated

## 2022-12-16 NOTE — ASSESSMENT & PLAN NOTE
Patient with Hypoxic Respiratory failure which is Acute on chronic.  he is on home oxygen at 2 LPM. Supplemental oxygen was provided and noted- Oxygen Concentration (%):  [100] 100.   Signs/symptoms of respiratory failure include- increased work of breathing and respiratory distress. Contributing diagnoses includes - COPD and Pneumonia Labs and images were reviewed. Patient Has recent ABG, which has been reviewed. Will treat underlying causes and adjust management of respiratory failure as follows- see plans for HAP & COPD exacerbation

## 2022-12-16 NOTE — ASSESSMENT & PLAN NOTE
- History of COPD on supplemental O2, 2L NC at home   - Presented with worsening SOB   - Likely related to a COPD exacerbation in the setting of pneumonia      Plan:   - Continue steroids.  - Duo-Nebs scheduled  - Wean O2 as tolerated

## 2022-12-16 NOTE — SUBJECTIVE & OBJECTIVE
Past Medical History:   Diagnosis Date    Combined systolic and diastolic heart failure     Hypertension     Lung cancer     NSCLC    Lung mass     left lung    Peripheral artery disease        Past Surgical History:   Procedure Laterality Date    BRONCHOSCOPY N/A 08/30/2019    Procedure: Bronchoscopy;  Surgeon: Miguel Diagnostic Provider;  Location: [facility] OR [address] [place] [address];  Service: Anesthesiology;  Laterality: N/A;    CORONARY ANGIOGRAPHY N/A 03/04/2022    Procedure: ANGIOGRAM, CORONARY ARTERY;  Surgeon: Robson Green MD;  Location: [facility] CATH LAB;  Service: Cardiology;  Laterality: N/A;    INSERTION OF TUNNELED CENTRAL VENOUS CATHETER (CVC) WITH SUBCUTANEOUS PORT         Review of patient's allergies indicates:  No Known Allergies    No current facility-administered medications on file prior to encounter.     Current Outpatient Medications on File Prior to Encounter   Medication Sig    albuterol (VENTOLIN HFA) 90 mcg/actuation inhaler Inhale 2 puffs into the lungs every 6 (six) hours as needed for Wheezing. Rescue    albuterol-ipratropium (DUO-NEB) 2.5 mg-0.5 mg/3 mL nebulizer solution Inhale contents of 1 vial (3 mLs) by nebulization every 4 (four) hours as needed for Wheezing or Shortness of Breath. Rescue    amLODIPine (NORVASC) 10 MG tablet Take 1 tablet (10 mg total) by mouth once daily.    apixaban (ELIQUIS) 5 mg Tab Take 1 tablet (5 mg total) by mouth 2 (two) times daily.    ascorbic acid-multivit-min (VITAMIN C ENERGY BOOSTER) 1,000 mg PwEP Take 3,000 mg by mouth once daily. Patient takes 3,000 mg per day    aspirin 81 MG Chew Take 1 tablet (81 mg total) by mouth once daily.    atorvastatin (LIPITOR) 80 MG tablet Take 1 tablet (80 mg total) by mouth once daily.    cilostazoL (PLETAL) 100 MG Tab Take 1 tablet (100 mg total) by mouth 2 (two) times daily.    clopidogreL (PLAVIX) 75 mg tablet Take 1 tablet (75 mg total) by mouth once daily.    dronabinoL (MARINOL) 5 MG capsule Take 1 capsule (5 mg total) by mouth 2  (two) times daily before meals. for 14 days    fluticasone-salmeterol diskus inhaler 100-50 mcg Inhale 1 puff into the lungs 2 (two) times daily. Controller    furosemide (LASIX) 40 MG tablet Take 1 tablet (40 mg total) by mouth 2 (two) times a day.    HYDROcodone-acetaminophen (NORCO) 7.5-325 mg per tablet Take 1 tablet by mouth every 4 (four) hours as needed. for pain.    polyethylene glycol (GLYCOLAX) 17 gram/dose powder Mix 1 capful (17 grams total) with a liquid and take by mouth once daily.    rivaroxaban (XARELTO) 20 mg Tab Take 1 tablet (20 mg total) by mouth daily with dinner or evening meal.    tiotropium (SPIRIVA) 18 mcg inhalation capsule Inhale 1 capsule (18 mcg total) into the lungs via handihaler once daily. Controller     Family History       Problem Relation (Age of Onset)    Cancer Father, Sister    Diabetes Mother    Heart defect Mother    No Known Problems Son          Tobacco Use    Smoking status: Former     Packs/day: 1.00     Years: 25.00     Pack years: 25.00     Types: Cigarettes     Quit date:      Years since quittin.9    Smokeless tobacco: Never   Substance and Sexual Activity    Alcohol use: Yes     Comment: 11/3/22: Occ.    Drug use: Never    Sexual activity: Yes     Partners: Female     Review of Systems   Constitutional: Negative.    HENT: Negative.     Eyes: Negative.    Respiratory:  Positive for cough and shortness of breath.    Cardiovascular: Negative.    Gastrointestinal: Negative.    Endocrine: Negative.    Genitourinary: Negative.    Musculoskeletal: Negative.    Skin: Negative.    Allergic/Immunologic: Negative.    Neurological: Negative.    Psychiatric/Behavioral: Negative.     Objective:     Vital Signs (Most Recent):  Temp: 98.6 °F (37 °C) (22 0140)  Pulse: 102 (22 035)  Resp: (!) 31 (22)  BP: 129/69 (22)  SpO2: 95 % (22)   Vital Signs (24h Range):  Temp:  [98.2 °F (36.8 °C)-98.6 °F (37 °C)] 98.6 °F (37 °C)  Pulse:   [] 102  Resp:  [20-31] 31  SpO2:  [86 %-100 %] 95 %  BP: (117-146)/(69-81) 129/69     Weight: 87.5 kg (193 lb)  Body mass index is 27.69 kg/m².    Physical Exam  Vitals and nursing note reviewed.   Constitutional:       General: He is not in acute distress.     Appearance: Normal appearance. He is not ill-appearing.   HENT:      Head: Normocephalic and atraumatic.      Nose: Nose normal.      Mouth/Throat:      Mouth: Mucous membranes are moist.   Eyes:      Extraocular Movements: Extraocular movements intact.   Cardiovascular:      Rate and Rhythm: Normal rate.      Pulses: Normal pulses.      Heart sounds: No murmur heard.  Pulmonary:      Effort: Respiratory distress present.      Comments: Diffusely rhodochrous with faint expiratory wheezes.   Abdominal:      General: Abdomen is flat.      Palpations: Abdomen is soft.      Tenderness: There is no abdominal tenderness.   Musculoskeletal:      Right lower leg: No edema.      Left lower leg: No edema.   Skin:     General: Skin is warm.      Capillary Refill: Capillary refill takes less than 2 seconds.   Neurological:      General: No focal deficit present.      Mental Status: He is alert.   Psychiatric:         Mood and Affect: Mood normal.           Significant Labs: All pertinent labs within the past 24 hours have been reviewed.    Significant Imaging: I have reviewed all pertinent imaging results/findings within the past 24 hours.

## 2022-12-16 NOTE — NURSING
Update: Patient arrived to the unit via [facility] with transport. Currently running vancomycin  and supplemental O2 @ 7L on non rebreather mask Telemetry monitor placed.  . NAD noted. Fall/Safety precautions maintained. Call light and personal items within reach. Communication board updated.   Patient arrived with home oxygen tank in room and personal belongings

## 2022-12-16 NOTE — PROGRESS NOTES
Pharmacokinetic Initial Assessment: IV Vancomycin    Assessment/Plan:    Initiate intravenous vancomycin with loading dose of 2000 mg once followed by a maintenance dose of vancomycin 1250mg IV every 12 hours  Desired empiric serum trough concentration is 10 to 20 mcg/mL  Draw vancomycin trough level 60 min prior to fourth dose on 12/17/22 at approximately 18:00  Pharmacy will continue to follow and monitor vancomycin.      Please contact pharmacy at extension [phone] with any questions regarding this assessment.     Thank you for the consult,   Dayan Castro       Patient brief summary:  Kashif Iverson is a 62 y.o. male initiated on antimicrobial therapy with IV Vancomycin for treatment of suspected  pneumonia    Drug Allergies:   Review of patient's allergies indicates:  No Known Allergies    Actual Body Weight:   88.5 kg    Renal Function:   Estimated Creatinine Clearance: 95.3 mL/min (based on SCr of 0.9 mg/dL).,     Dialysis Method (if applicable):  N/A    CBC (last 72 hours):  Recent Labs   Lab Result Units 12/13/22  1225 12/16/22  0115   WBC K/uL 9.92 16.93*   Hemoglobin g/dL 12.2* 11.7*   Hematocrit % 35.4* 35.1*   Platelets K/uL 345 357   Gran % % 74.9* 93.4*   Lymph % % 9.1* 4.2*   Mono % % 14.5 1.8*   Eosinophil % % 0.8 0.0   Basophil % % 0.4 0.2   Differential Method  Automated Automated       Metabolic Panel (last 72 hours):  Recent Labs   Lab Result Units 12/13/22  1225 12/16/22  0115 12/16/22  0346   Sodium mmol/L 140 136  --    Potassium mmol/L 2.9* 3.4*  --    Chloride mmol/L 99 98  --    CO2 mmol/L 31* 24  --    Glucose mg/dL 123* 199*  --    Glucose, UA   --   --  Negative   BUN mg/dL 9 14  --    Creatinine mg/dL 0.8 0.9  --    Albumin g/dL 3.4* 3.0*  --    Total Bilirubin mg/dL 0.8 1.6*  --    Alkaline Phosphatase U/L 108 81  --    AST U/L 13 16  --    ALT U/L 8* 8*  --    Magnesium mg/dL 1.5*  --   --        Drug levels (last 3 results):  No results for input(s): VANCOMYCINRA, VANCORANDOM,  VANCOMYCINPE, VANCOPEAK, VANCOMYCINTR, VANCOTROUGH in the last 72 hours.    Microbiologic Results:  Microbiology Results (last 7 days)       Procedure Component Value Units Date/Time    Culture, Respiratory with Gram Stain [[id number]] Collected: 12/16/22 0706    Order Status: Sent Specimen: Sputum Updated: 12/16/22 0707    Blood culture #1 **CANNOT BE ORDERED STAT** [[id number]] Collected: 12/16/22 0224    Order Status: Sent Specimen: Blood Updated: 12/16/22 0224    Blood culture #2 **CANNOT BE ORDERED STAT** [[id number]] Collected: 12/16/22 0224    Order Status: Sent Specimen: Blood Updated: 12/16/22 0224

## 2022-12-16 NOTE — RESPIRATORY THERAPY
Patient received on 100% non-rebreather mask. Sp02 99%. ABG obtained and analyzed. Aerosol treatment given as ordered. POST aerosol treatment, patient was weaned to venturi mask 50%, sp02 94%. Will continue to monitor.

## 2022-12-16 NOTE — RESPIRATORY THERAPY
Latest Reference Range & Units 12/16/22 00:56   Sample  ARTERIAL   POC PH 7.35 - 7.45  7.513 (H)   POC PCO2 35 - 45 mmHg 33.8 (L)   POC PO2 80 - 100 mmHg 124 (H)   POC HCO3 24 - 28 mmol/L 27.1   POC TCO2 23 - 27 mmol/L 28 (H)   POC SATURATED O2 95 - 100 % 99   Allens Test  Pass   POC BE -2 to 2 mmol/L 4   FiO2  100   Flow  12   [facility]   Site  RR   Mode  SPONT   (H): Data is abnormally high  (L): Data is abnormally low    Patient weaned to 50% venturi mask

## 2022-12-16 NOTE — ED NOTES
Patient brought in by triage tech on 3lpm NC. Put on monitor, SPO2 was 79%, increased to 5Lpm, SPO2 was 85%. Put on NRB at 15 LPM = 100%

## 2022-12-16 NOTE — PLAN OF CARE
[facility] - Telemetry  Initial Discharge Assessment    Patient from home and lives with spouse, who will be his help at home. Pt stated he is on 2L O2 at home and has wc. Referral for out patient case management.TN to continue to follow for discharge needs.      Primary Care Provider: Eduardo Ruiz MD    Admission Diagnosis: Shortness of breath [R06.02]  Pleural effusion [J90]  Elevated troponin [R77.8]  SIRS (systemic inflammatory response syndrome) [R65.10]  Chest pain [R07.9]  Pneumonia of both lungs due to infectious organism, unspecified part of lung [J18.9]  Acute hypoxemic respiratory failure [J96.01]    Admission Date: 12/16/2022  Expected Discharge Date: 12/19/2022    Discharge Barriers Identified: None    Payor: MEDICARE / Plan: MEDICARE PART A & B / Product Type: Government /     Extended Emergency Contact Information  Primary Emergency Contact: Natty Iverson  Mobile Phone: [phone]  Relation: Spouse  Preferred language: English   needed? No  Secondary Emergency Contact: Rolando Iverson  Mobile Phone: [phone]  Relation: Brother  Preferred language: English   needed? No    Discharge Plan A: Home with family  Discharge Plan B: Other (TBD)      [id number] [place] [address] AT [address] [address]  Phone: [phone] Fax: [phone]    [facility] [address]  [address] [place] [address]  Phone: [phone] Fax: [phone]      Initial Assessment (most recent)       Adult Discharge Assessment - 12/16/22 1145          Discharge Assessment    Assessment Type Discharge Planning Assessment     Confirmed/corrected address, phone number and insurance Yes     Confirmed Demographics Correct on Facesheet     Source of Information patient     When was your last doctors appointment? --   unsure    Does patient/caregiver understand observation status No     Was observation education  provided? No     Communicated [place] with patient/caregiver Date not available/Unable to determine     Reason For Admission Acute on chronic respiratory failure     People in Home spouse     Facility Arrived From: Home     Do you expect to return to your current living situation? Yes     Do you have help at home or someone to help you manage your care at home? Yes     Who are your caregiver(s) and their phone number(s)? Natty Iverson (Spouse)   [phone]     Prior to hospitilization cognitive status: Alert/Oriented     Current cognitive status: Alert/Oriented     Equipment Currently Used at Home oxygen;cane, straight;wheelchair     Readmission within 30 days? No     Patient currently being followed by outpatient case management? No     Do you currently have service(s) that help you manage your care at home? No     Do you take prescription medications? Yes     Do you have prescription coverage? Yes     Coverage Medicare     Do you have any problems affording any of your prescribed medications? No     Is the patient taking medications as prescribed? yes     Who is going to help you get home at discharge? Natty Iverson (Spouse)   [phone]     How do you get to doctors appointments? family or friend will provide     Are you on dialysis? No     Do you take coumadin? No     Discharge Plan A Home with family     Discharge Plan B Other   TBD    DME Needed Upon Discharge  other (see comments)   TBD    Discharge Plan discussed with: Patient     Discharge Barriers Identified None

## 2022-12-16 NOTE — ASSESSMENT & PLAN NOTE
-Echocardiogram (2/22/2022): EF: 35%-40%, GIDD, normal RV systolic function, mild TR   -Echocardiogram (5/18/2022): EF: 65%, indeterminate LV diastolic function, mild TR, PA pressure of 54 mmHg  -Echocardiogram (9/30/2022): EF: 60%, mildly reduced RV systolic function, PA pressure of 40 mmHg.   -Elevated BNP, around baseline   -Doubt exacerbation   -Resume home Lasix

## 2022-12-16 NOTE — Clinical Note
Diagnosis: Pneumonia of both lungs due to infectious organism, unspecified part of lung [9634051]   Admitting Provider:: RANCHO ZAMUDIO [[id number]]   Future Attending Provider: RANCHO ZAMUDIO [[id number]]   Reason for IP Medical Treatment  (Clinical interventions that can only be accomplished in the IP setting? ) :: Pneumonia, hypoxia, Sirs criteria   Estimated Length of Stay:: 2 midnights   I certify that Inpatient services for greater than or equal to 2 midnights are medically necessary:: Yes   Plans for Post-Acute care--if anticipated (pick the single best option):: A. No post acute care anticipated at this time

## 2022-12-16 NOTE — HPI
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     Patient reports developing worsening shortness of breath and productive cough since he took his last chemotherapy dose on 12/13. He is on 2L O2 at home, and SpO2 readings were as low as 80%, and would not improved despite increasing the O2 to 4L. He has multiple admissions for similar presentation. No longer a smoker. In the ED, the patient was tachycardic, tachypnic and hypoxic requiring 12L O2. Labs showed leukocytosis (16.9), elevated d-dimer (1.94), hypokalemia (3.4), elevated BNP (355), elevated troponin (0.089), elevated lactic acid (2.3). CTA showed no PE, presistent and worsening occlusion of left upper lobe bronchus with superimposed interstitial and alveolar infiltrates., increased ground glass opacities, moderate to large right pleural effusion, small left pleural effusion and other chronic stable changes.  . He was given fluids, vancomycin, zosyn, and DuoNeb x1. The patient was admitted for further management.

## 2022-12-17 NOTE — CLINICAL REVIEW
IP Sepsis Screen (most recent)       Sepsis Screen (IP) - 12/17/22 1120       Is the patient's history or complaint suggestive of a possible infection? Yes  -DD    Are there at least two of the following signs and symptoms present? Yes  -DD    Sepsis signs/symptoms - Tachycardia Tachycardia     >90  -DD    Sepsis signs/symptoms - Tachycardia Tachycardia     >110  -DD    Sepsis signs/symptoms - WBC WBC < 4,000 or WBC > 12,000  -DD    Are any of the following organ dysfunction criteria present and not considered to be due to a chronic condition? Yes  -DD    Organ Dysfunction Criteria - Resp Comp Respiratory Compromise: Requiring > 5L NC  -DD    Initiate Sepsis Protocol No  -DD    Reason sepsis not considered Pt. receiving appropriate management  -DD              User Key  (r) = Recorded By, (t) = Taken By, (c) = Cosigned By      Initials Name    NONA Alarcon RN

## 2022-12-17 NOTE — SUBJECTIVE & OBJECTIVE
YASMEEN. Denies CP.   Eating okay. UOP wnl. BM wnl.           Review of Systems   Constitutional: Negative.    HENT: Negative.     Eyes: Negative.    Respiratory:  Positive for cough and shortness of breath.    Cardiovascular: Negative.    Gastrointestinal: Negative.    Endocrine: Negative.    Genitourinary: Negative.    Musculoskeletal: Negative.    Skin: Negative.    Allergic/Immunologic: Negative.    Neurological: Negative.    Psychiatric/Behavioral: Negative.         Objective:     Vital Signs (Most Recent):  Temp: 98.7 °F (37.1 °C) (12/17/22 0707)  Pulse: 104 (12/17/22 0707)  Resp: 20 (12/17/22 0543)  BP: 111/73 (12/17/22 0707)  SpO2: (!) 93 % (12/17/22 0543)   Vital Signs (24h Range):  Temp:  [96.4 °F (35.8 °C)-98.7 °F (37.1 °C)] 98.7 °F (37.1 °C)  Pulse:  [] 104  Resp:  [14-20] 20  SpO2:  [91 %-99 %] 93 %  BP: (111-133)/(59-88) 111/73     Weight: 87.5 kg (192 lb 14.4 oz)  Body mass index is 27.68 kg/m².    Physical Exam  Vitals and nursing note reviewed.   Constitutional:       General: He is not in acute distress.     Appearance: Normal appearance. He is not ill-appearing.   HENT:      Head: Normocephalic and atraumatic.      Nose: Nose normal.      Mouth/Throat:      Mouth: Mucous membranes are moist.   Eyes:      Extraocular Movements: Extraocular movements intact.   Cardiovascular:      Rate and Rhythm: Normal rate.      Pulses: Normal pulses.      Heart sounds: No murmur heard.  Pulmonary:      Effort: Respiratory distress present.      Comments: Diffusely rhodochrous with faint expiratory wheezes.   Abdominal:      General: Abdomen is flat.      Palpations: Abdomen is soft.      Tenderness: There is no abdominal tenderness.   Musculoskeletal:      Right lower leg: No edema.      Left lower leg: No edema.   Skin:     General: Skin is warm.      Capillary Refill: Capillary refill takes less than 2 seconds.   Neurological:      General: No focal deficit present.      Mental Status: He is alert.    Psychiatric:         Mood and Affect: Mood normal.               No results found for this or any previous visit (from the past 24 hour(s)).    Microbiology Results (last 7 days)       Procedure Component Value Units Date/Time    Blood culture #1 **CANNOT BE ORDERED STAT** [[id number]] Collected: 12/16/22 0224    Order Status: Completed Specimen: Blood Updated: 12/17/22 0303     Blood Culture, Routine No Growth to date      No Growth to date    Blood culture #2 **CANNOT BE ORDERED STAT** [[id number]] Collected: 12/16/22 0224    Order Status: Completed Specimen: Blood Updated: 12/17/22 0303     Blood Culture, Routine No Growth to date      No Growth to date    Culture, Respiratory with Gram Stain [[id number]] Collected: 12/16/22 0706    Order Status: Completed Specimen: Sputum Updated: 12/16/22 0745     Gram Stain (Respiratory) <10 epithelial cells per low power field.     Gram Stain (Respiratory) Few WBC's     Gram Stain (Respiratory) Moderate Gram positive cocci in pairs and clusters             Imaging Results               CTA Chest Non-Coronary (PE Studies) (Final result)  Result time 12/16/22 03:55:50      Final result by Jordin Sofia MD (12/16/22 03:55:50)                   Impression:      There is no large central saddle type pulmonary embolus, when accounting for limitations of the examination there is no additional evidence for pulmonary embolus on this exam.    Persistent abnormal appearance of the left upper lobe with suspected occlusion of the left upper lobe bronchus, and volume loss of the left upper lobe however there is progression of abnormality, with increased pattern of interstitial and alveolar opacity, which may relate to superimposed interstitial and alveolar infiltrates/airspace disease and consolidative change and volume loss.    The lungs bilaterally demonstrate appearance of increased interstitial and ground-glass opacity with additional areas of confluent infiltrates/airspace disease  having developed at the left lower lobe inferiorly.    Previously identified opacity that may relate to chronic and consolidative change of the left upper lobe posterior medially appears similar to the prior study.    Mild opacity within the trachea layering posteriorly superior to the jonnathan may relate to secretions or aspiration.  Bilateral peribronchial thickening is also noted.    Moderate to large right pleural effusion, small left pleural effusion.  Small pericardial effusion.    Left supraclavicular lesion again noted, not well evaluated on this examination as discussed above, similarly the previously identified left upper lobe nodule is obscured due to the findings of the left upper lobe at this time.    Additional findings as above.    This report was flagged in Epic as abnormal.      Electronically signed by: Jordin Sofia  Date:    12/16/2022  Time:    03:55               Narrative:    EXAMINATION:  CTA CHEST NON CORONARY (PE STUDIES)    CLINICAL HISTORY:  Pulmonary embolism (PE) suspected, positive D-dimer;    TECHNIQUE:  Low dose axial images, sagittal and coronal reformations were obtained from the thoracic inlet to the lung bases following the IV administration of 75 mL of Omnipaque 350.  Contrast timing was optimized to evaluate the pulmonary arteries.  MIP images were performed.    COMPARISON:  Prior examinations, most recent of which is CT examination of the chest November 18, 2022    FINDINGS:  There is no large central saddle type pulmonary embolus.  When accounting for artifact the pulmonary arterial vascular demonstrate opacification without evidence for abnormal pattern of pulmonary arterial filling defects specific for pulmonary emboli.    There is a right-sided central venous catheter noted, the distal tip extends to the SVC.  Better demonstrated on the prior examination there is a supraclavicular/retroclavicular soft tissue lesion, consistent with a soft tissue mass or enlarged adenopathy,  this is difficult to accurately measure on this exam, as the examination was optimized for evaluation of the pulmonary arterial vasculature rather than soft tissues.    There is a moderate to large pleural effusion on the right, and a small pleural effusion on the left, this is seen as an interval change.  There is also a small pericardial effusion.  The thoracic aorta demonstrates appropriate opacification.  Atherosclerotic change noted.    The lungs demonstrate chronic change with emphysematous change noted.  The right hemithorax demonstrates appearance of interstitial infiltrate/edema and ground-glass infiltrate throughout the right hemithorax, there is atelectasis noted, most notably at the right lower lobe, associated with the aforementioned pleural effusion.    As previously noted there is appearance thought to represent occlusion of the left upper lobe bronchus.  There is volume loss of the left upper lobe, there is increased pattern of interstitial as well as alveolar opacity, prominent confluent opacity may relate to components of consolidation, and volume loss.  The previously identified nodule at the left upper lobe medially is obscured by the aforementioned.  The left lower lobe demonstrates interstitial edema/infiltrate, as well as a diffuse pattern of ground-glass infiltrate.  There is an area of superimposed opacity consistent with consolidative change posteriorly medially appearing similar to the prior examination.  There is additional appearance of confluent infiltrates/airspace disease at the left lung base.    There is mild opacity layering posteriorly within the trachea just above the level of the jonnahtan this may relate to secretions or aspiration.  There is peribronchial thickening bilaterally most notably at the lung bases.  There is no evidence for pneumothorax.    Limited imaging of the upper abdomen demonstrates no evidence for acute upper abdominal process.  The visualized osseous  structures demonstrate chronic change.                                       X-Ray Chest 1 View (Final result)  Result time 12/16/22 01:02:44      Final result by Luis Enrique Schroeder MD (12/16/22 01:02:44)                   Impression:      Stable pleural and parenchymal opacities and Port-A-Cath with no acute findings radiographically.      Electronically signed by: Luis Enrique Schroeder  Date:    12/16/2022  Time:    01:02               Narrative:    EXAMINATION:  XR CHEST 1 VIEW    CLINICAL HISTORY:  shortness of breath;    TECHNIQUE:  Single frontal view of the chest was performed.    COMPARISON:  11/03/2002    FINDINGS:  Pleural and parenchymal opacities do not appear significantly changed.  Port-A-Cath remains in position.  Bones remain intact.  The heart mediastinal contours are stable.

## 2022-12-17 NOTE — NURSING
02 91% and HR elevated  at 172 . Notified Rt pt just received breathing treatment and  was in process of urinating

## 2022-12-17 NOTE — NURSING
Nurse Carl requested that I monitor this pt closely as 's-130's at times. New orders received from Dr. Dupree. I have been at pt's bedside multiple times thru out shift to check on pt. He has denied CP and SOB and states that he is feeling better.Will con't to monitor pt.

## 2022-12-17 NOTE — HOSPITAL COURSE
Lung Cancer with superimposed Pneumonia. Staph-like organisms growing in sputum, continued broad coverage until results.  Pulmonology consulted, appreciate assessment, will ask IR to tap the new pleural effusion for distinguish meant between malignant effusion, cardiac/HF effusion, or infection- thoracentesis labs ordered. Multiple hospital admissions the last few months, poor medical understanding. Palliative care consult placed for 12/19. Thoracentesis done on 12/19. Apixaban and Plavix received early 12/18 morning, held for thoracentesis thereafter, restart after completed. Labs trended to normal. O2 requirement reduced from 12L to 8L to 5L. Mentation improved.  Pleural fluid chemistry c/w exudative.  No bacteria on gram stain.  cxr with improvement of right lung. Vanc/Ceft/Doxy stopped and transitioned to oral Levaquin.  Completed course of ABx's during hospital stay.  He has remained afebrile and hemodynamically stable.  Currently on home 2L oxygen per NC and stable.  He will be discharged home with HH to follow up with PCP and Oncology.

## 2022-12-17 NOTE — PROGRESS NOTES
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     Patient reports developing worsening shortness of breath and productive cough since he took his last chemotherapy dose on 12/13. He is on 2L O2 at home, and SpO2 readings were as low as 80%, and would not improved despite increasing the O2 to 4L. He has multiple admissions for similar presentation. No longer a smoker. In the ED, the patient was tachycardic, tachypnic and hypoxic requiring 12L O2. Labs showed leukocytosis (16.9), elevated d-dimer (1.94), hypokalemia (3.4), elevated BNP (355), elevated troponin (0.089), elevated lactic acid (2.3). CTA showed no PE, presistent and worsening occlusion of left upper lobe bronchus with superimposed interstitial and alveolar infiltrates., increased ground glass opacities, moderate to large right pleural effusion, small left pleural effusion and other chronic stable changes.  . He was given fluids, vancomycin, zosyn, and DuoNeb x1. The patient was admitted for further management.       Overview/Hospital Course:  Lung Cancer with superimposed Pneumonia. Staph-like organisms growing in sputum, continue broad coverage until results.       NAEON. Denies CP.   Eating okay. UOP wnl. BM wnl.           Review of Systems   Constitutional: Negative.    HENT: Negative.     Eyes: Negative.    Respiratory:  Positive for cough and  shortness of breath.    Cardiovascular: Negative.    Gastrointestinal: Negative.    Endocrine: Negative.    Genitourinary: Negative.    Musculoskeletal: Negative.    Skin: Negative.    Allergic/Immunologic: Negative.    Neurological: Negative.    Psychiatric/Behavioral: Negative.         Objective:     Vital Signs (Most Recent):  Temp: 98.7 °F (37.1 °C) (12/17/22 0707)  Pulse: 104 (12/17/22 0707)  Resp: 20 (12/17/22 0543)  BP: 111/73 (12/17/22 0707)  SpO2: (!) 93 % (12/17/22 0543)   Vital Signs (24h Range):  Temp:  [96.4 °F (35.8 °C)-98.7 °F (37.1 °C)] 98.7 °F (37.1 °C)  Pulse:  [] 104  Resp:  [14-20] 20  SpO2:  [91 %-99 %] 93 %  BP: (111-133)/(59-88) 111/73     Weight: 87.5 kg (192 lb 14.4 oz)  Body mass index is 27.68 kg/m².    Physical Exam  Vitals and nursing note reviewed.   Constitutional:       General: He is not in acute distress.     Appearance: Normal appearance. He is not ill-appearing.   HENT:      Head: Normocephalic and atraumatic.      Nose: Nose normal.      Mouth/Throat:      Mouth: Mucous membranes are moist.   Eyes:      Extraocular Movements: Extraocular movements intact.   Cardiovascular:      Rate and Rhythm: Normal rate.      Pulses: Normal pulses.      Heart sounds: No murmur heard.  Pulmonary:      Effort: Respiratory distress present.      Comments: Diffusely rhodochrous with faint expiratory wheezes.   Abdominal:      General: Abdomen is flat.      Palpations: Abdomen is soft.      Tenderness: There is no abdominal tenderness.   Musculoskeletal:      Right lower leg: No edema.      Left lower leg: No edema.   Skin:     General: Skin is warm.      Capillary Refill: Capillary refill takes less than 2 seconds.   Neurological:      General: No focal deficit present.      Mental Status: He is alert.   Psychiatric:         Mood and Affect: Mood normal.               No results found for this or any previous visit (from the past 24 hour(s)).    Microbiology Results (last 7 days)        Procedure Component Value Units Date/Time    Blood culture #1 **CANNOT BE ORDERED STAT** [[id number]] Collected: 12/16/22 0224    Order Status: Completed Specimen: Blood Updated: 12/17/22 0303     Blood Culture, Routine No Growth to date      No Growth to date    Blood culture #2 **CANNOT BE ORDERED STAT** [[id number]] Collected: 12/16/22 0224    Order Status: Completed Specimen: Blood Updated: 12/17/22 0303     Blood Culture, Routine No Growth to date      No Growth to date    Culture, Respiratory with Gram Stain [[id number]] Collected: 12/16/22 0706    Order Status: Completed Specimen: Sputum Updated: 12/16/22 0745     Gram Stain (Respiratory) <10 epithelial cells per low power field.     Gram Stain (Respiratory) Few WBC's     Gram Stain (Respiratory) Moderate Gram positive cocci in pairs and clusters             Imaging Results               CTA Chest Non-Coronary (PE Studies) (Final result)  Result time 12/16/22 03:55:50      Final result by Jrodin Sofia MD (12/16/22 03:55:50)                   Impression:      There is no large central saddle type pulmonary embolus, when accounting for limitations of the examination there is no additional evidence for pulmonary embolus on this exam.    Persistent abnormal appearance of the left upper lobe with suspected occlusion of the left upper lobe bronchus, and volume loss of the left upper lobe however there is progression of abnormality, with increased pattern of interstitial and alveolar opacity, which may relate to superimposed interstitial and alveolar infiltrates/airspace disease and consolidative change and volume loss.    The lungs bilaterally demonstrate appearance of increased interstitial and ground-glass opacity with additional areas of confluent infiltrates/airspace disease having developed at the left lower lobe inferiorly.    Previously identified opacity that may relate to chronic and consolidative change of the left upper lobe posterior medially appears  similar to the prior study.    Mild opacity within the trachea layering posteriorly superior to the jonnathan may relate to secretions or aspiration.  Bilateral peribronchial thickening is also noted.    Moderate to large right pleural effusion, small left pleural effusion.  Small pericardial effusion.    Left supraclavicular lesion again noted, not well evaluated on this examination as discussed above, similarly the previously identified left upper lobe nodule is obscured due to the findings of the left upper lobe at this time.    Additional findings as above.    This report was flagged in Epic as abnormal.      Electronically signed by: Jordin Sofia  Date:    12/16/2022  Time:    03:55               Narrative:    EXAMINATION:  CTA CHEST NON CORONARY (PE STUDIES)    CLINICAL HISTORY:  Pulmonary embolism (PE) suspected, positive D-dimer;    TECHNIQUE:  Low dose axial images, sagittal and coronal reformations were obtained from the thoracic inlet to the lung bases following the IV administration of 75 mL of Omnipaque 350.  Contrast timing was optimized to evaluate the pulmonary arteries.  MIP images were performed.    COMPARISON:  Prior examinations, most recent of which is CT examination of the chest November 18, 2022    FINDINGS:  There is no large central saddle type pulmonary embolus.  When accounting for artifact the pulmonary arterial vascular demonstrate opacification without evidence for abnormal pattern of pulmonary arterial filling defects specific for pulmonary emboli.    There is a right-sided central venous catheter noted, the distal tip extends to the SVC.  Better demonstrated on the prior examination there is a supraclavicular/retroclavicular soft tissue lesion, consistent with a soft tissue mass or enlarged adenopathy, this is difficult to accurately measure on this exam, as the examination was optimized for evaluation of the pulmonary arterial vasculature rather than soft tissues.    There is a  moderate to large pleural effusion on the right, and a small pleural effusion on the left, this is seen as an interval change.  There is also a small pericardial effusion.  The thoracic aorta demonstrates appropriate opacification.  Atherosclerotic change noted.    The lungs demonstrate chronic change with emphysematous change noted.  The right hemithorax demonstrates appearance of interstitial infiltrate/edema and ground-glass infiltrate throughout the right hemithorax, there is atelectasis noted, most notably at the right lower lobe, associated with the aforementioned pleural effusion.    As previously noted there is appearance thought to represent occlusion of the left upper lobe bronchus.  There is volume loss of the left upper lobe, there is increased pattern of interstitial as well as alveolar opacity, prominent confluent opacity may relate to components of consolidation, and volume loss.  The previously identified nodule at the left upper lobe medially is obscured by the aforementioned.  The left lower lobe demonstrates interstitial edema/infiltrate, as well as a diffuse pattern of ground-glass infiltrate.  There is an area of superimposed opacity consistent with consolidative change posteriorly medially appearing similar to the prior examination.  There is additional appearance of confluent infiltrates/airspace disease at the left lung base.    There is mild opacity layering posteriorly within the trachea just above the level of the jonnathan this may relate to secretions or aspiration.  There is peribronchial thickening bilaterally most notably at the lung bases.  There is no evidence for pneumothorax.    Limited imaging of the upper abdomen demonstrates no evidence for acute upper abdominal process.  The visualized osseous structures demonstrate chronic change.                                       X-Ray Chest 1 View (Final result)  Result time 12/16/22 01:02:44      Final result by Luis Enrique Schroeder MD  (12/16/22 01:02:44)                   Impression:      Stable pleural and parenchymal opacities and Port-A-Cath with no acute findings radiographically.      Electronically signed by: Luis Enrique Schroeder  Date:    12/16/2022  Time:    01:02               Narrative:    EXAMINATION:  XR CHEST 1 VIEW    CLINICAL HISTORY:  shortness of breath;    TECHNIQUE:  Single frontal view of the chest was performed.    COMPARISON:  11/03/2002    FINDINGS:  Pleural and parenchymal opacities do not appear significantly changed.  Port-A-Cath remains in position.  Bones remain intact.  The heart mediastinal contours are stable.                                            Assessment/Plan:      * Acute on chronic respiratory failure with hypoxia  Patient with Hypoxic Respiratory failure which is Acute on chronic.  he is on home oxygen at 2 LPM. Supplemental oxygen was provided and noted- Oxygen Concentration (%):  [100] 100.   Signs/symptoms of respiratory failure include- increased work of breathing and respiratory distress. Contributing diagnoses includes - COPD and Pneumonia Labs and images were reviewed. Patient Has recent ABG, which has been reviewed. Will treat underlying causes and adjust management of respiratory failure as follows- see plans for HAP & COPD exacerbation       Persistent atrial fibrillation  [id number] Score: 1.  Resume home medications     Chronic obstructive pulmonary disease with acute exacerbation  - History of COPD on supplemental O2, 2L NC at home   - Presented with worsening SOB   - Likely related to a COPD exacerbation in the setting of pneumonia      Plan:   - Continue steroids.  - Duo-Nebs scheduled  - Wean O2 as tolerated    Coronary artery disease involving native coronary artery of native heart without angina pectoris  Denies chest pain  Resume aspirin, plavix, and statin      PAD (peripheral artery disease)  Continue home medications       Chronic diastolic heart failure  -Echocardiogram (2/22/2022): EF:  35%-40%, GIDD, normal RV systolic function, mild TR   -Echocardiogram (5/18/2022): EF: 65%, indeterminate LV diastolic function, mild TR, PA pressure of 54 mmHg  -Echocardiogram (9/30/2022): EF: 60%, mildly reduced RV systolic function, PA pressure of 40 mmHg.   -Elevated BNP, around baseline   -Doubt exacerbation   -Resume home Lasix       Essential hypertension  Resume home medications       Lung cancer, main bronchus, left  On maintenance chemotherapy and radiation   Outpatient follow up with palliative care/oncology       HAP (hospital-acquired pneumonia)  Presented with SOB, cough   CTA showed no PE, presistent and worsening occlusion of left upper lobe bronchus with superimposed interstitial and alveolar infiltrates., increased ground glass opacities, moderate to large right pleural effusion, small left pleural effusion and other chronic stable change.   Concern for HAP/post obstructive pneumonia    Plan:   - Vancomycin, zosyn and doxycyline   - Respiratory cultures   - Wean O2 as tolerated       VTE Risk Mitigation (From admission, onward)         Ordered     apixaban tablet 5 mg  2 times daily         12/16/22 0435     IP VTE HIGH RISK PATIENT  Once         12/16/22 0435     Place sequential compression device  Until discontinued         12/16/22 0435                Discharge Planning   [place]: 12/19/2022     Code Status: Full Code   Is the patient medically ready for discharge?:     Reason for patient still in hospital (select all that apply): Patient trending condition and Treatment  Discharge Plan A: Home with family                  Neel Dpuree MD  Department of Hospital Medicine   [facility] [place] - Telemetry

## 2022-12-17 NOTE — NURSING
Pt being monitored closely by staff for irregular HR. MD updated on irregular rhythm, pt has hx of afib. Pt denies cp, remains on high flow oxygen. Pt at times confused, oritented to self and situation, at times disoriented to time and place.

## 2022-12-18 PROBLEM — J90 PLEURAL EFFUSION ON RIGHT: Status: ACTIVE | Noted: 2022-01-01

## 2022-12-18 NOTE — PROGRESS NOTES
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     Patient reports developing worsening shortness of breath and productive cough since he took his last chemotherapy dose on 12/13. He is on 2L O2 at home, and SpO2 readings were as low as 80%, and would not improved despite increasing the O2 to 4L. He has multiple admissions for similar presentation. No longer a smoker. In the ED, the patient was tachycardic, tachypnic and hypoxic requiring 12L O2. Labs showed leukocytosis (16.9), elevated d-dimer (1.94), hypokalemia (3.4), elevated BNP (355), elevated troponin (0.089), elevated lactic acid (2.3). CTA showed no PE, presistent and worsening occlusion of left upper lobe bronchus with superimposed interstitial and alveolar infiltrates., increased ground glass opacities, moderate to large right pleural effusion, small left pleural effusion and other chronic stable changes.  . He was given fluids, vancomycin, zosyn, and DuoNeb x1. The patient was admitted for further management.       Overview/Hospital Course:  Lung Cancer with superimposed Pneumonia. Staph-like organisms growing in sputum, continue broad coverage until results.  Pulmonology consulted, appreciate assessment, will ask IR to tap the new pleural effusion for distinguish meant between malignant effusion, cardiac/HF effusion, or infection- thoracentesis labs  ordered. Multiple hospital admissions the last few months, poor medical understanding. Palliative care consult placed for 12/19.       No new subjective & objective note has been filed under this hospital service since the last note was generated.      Assessment/Plan:      * Acute on chronic respiratory failure with hypoxia  Patient with Hypoxic Respiratory failure which is Acute on chronic.  he is on home oxygen at 2 LPM. Supplemental oxygen was provided and noted- Oxygen Concentration (%):  [100] 100.   Signs/symptoms of respiratory failure include- increased work of breathing and respiratory distress. Contributing diagnoses includes - COPD and Pneumonia Labs and images were reviewed. Patient Has recent ABG, which has been reviewed. Will treat underlying causes and adjust management of respiratory failure as follows- see plans for HAP & COPD exacerbation       Pleural effusion  Pulmonology consulted, appreciate assessment:    Present on chest CTA from 12/17 but not present on most recent CT from a month before.  History of diastolic CHF, as well as lung cancer with metastases to right-sided ribs.   Given the timeframe of CT changes, I suspect this is likely due to CHF (probably diastolic) rather than infection or cancer.  However, given known cancer and immunosuppression from chemotherapy, I think that right thoracentesis is appropriate when anticoagulation can be safely held.    · Cardiac/HF effusion vs Infx/parapneumonic effusion vs malignant effusion  · Will ask IR to tap the new pleural effusion for distinguish   · Thoracentesis labs ordered.   · Multiple hospital admissions the last few months, poor medical understanding- Palliative care consult placed for 12/19.    Persistent atrial fibrillation  [id number] Score: 1.  Resume home medications     Chronic obstructive pulmonary disease with acute exacerbation  - History of COPD on supplemental O2, 2L NC at home   - Presented with worsening SOB   - Likely  related to a COPD exacerbation in the setting of pneumonia      Plan:   - Continue steroids.  - Duo-Nebs scheduled  - Wean O2 as tolerated    Coronary artery disease involving native coronary artery of native heart without angina pectoris  Denies chest pain  Resume aspirin, plavix, and statin      PAD (peripheral artery disease)  Continue home medications       Chronic diastolic heart failure  -Echocardiogram (2/22/2022): EF: 35%-40%, GIDD, normal RV systolic function, mild TR   -Echocardiogram (5/18/2022): EF: 65%, indeterminate LV diastolic function, mild TR, PA pressure of 54 mmHg  -Echocardiogram (9/30/2022): EF: 60%, mildly reduced RV systolic function, PA pressure of 40 mmHg.   -Elevated BNP, around baseline   -Doubt exacerbation   -Resume home Lasix       Essential hypertension  Resume home medications       Lung cancer, main bronchus, left  On maintenance chemotherapy and radiation   Outpatient follow up with palliative care/oncology       HAP (hospital-acquired pneumonia)  Presented with SOB, cough   CTA showed no PE, presistent and worsening occlusion of left upper lobe bronchus with superimposed interstitial and alveolar infiltrates., increased ground glass opacities, moderate to large right pleural effusion, small left pleural effusion and other chronic stable change.   Concern for HAP/post obstructive pneumonia    Plan:   - Vancomycin, zosyn and doxycyline   - Respiratory cultures   - Wean O2 as tolerated     VTE Risk Mitigation (From admission, onward)         Ordered     IP VTE HIGH RISK PATIENT  Once         12/16/22 0435     Place sequential compression device  Until discontinued         12/16/22 0435                Discharge Planning   AZ: 12/19/2022     Code Status: Full Code   Is the patient medically ready for discharge?:     Reason for patient still in hospital (select all that apply): Patient trending condition and Treatment  Discharge Plan A: Home with family                  Neel Dupree,  MD  Department of Hospital Medicine   [facility] - Telemetry

## 2022-12-18 NOTE — ASSESSMENT & PLAN NOTE
Current presentation does not suggest COPD exacerbation.    · Continue bronchodilators  · Titrate oxygen as tolerated  · I don't think that steroids are necessary at this time.

## 2022-12-18 NOTE — PROGRESS NOTES
Pharmacokinetic Assessment Follow Up: IV Vancomycin    Vancomycin serum concentration assessment(s):    The trough level was drawn correctly and can be used to guide therapy at this time. The measurement is above the desired definitive target range of 10 to 20 mcg/mL.    Vancomycin Regimen Plan:    Change regimen to Vancomycin 1000 mg IV every 12 hours with next serum trough concentration measured at 05:00 prior to 3rd dose on 12/19/22.    Drug levels (last 3 results):  Recent Labs   Lab Result Units 12/17/22  1754   Vancomycin-Trough ug/mL 20.6       Pharmacy will continue to follow and monitor vancomycin.    Please contact pharmacy at extension [phone] for questions regarding this assessment.    Thank you for the consult,   Jocelin Pérez       Patient brief summary:  Kashif Iverson is a 62 y.o. male initiated on antimicrobial therapy with IV Vancomycin for treatment of  pneumonia.      Drug Allergies:   Review of patient's allergies indicates:  No Known Allergies    Actual Body Weight:   87.5 kg    Renal Function:   Estimated Creatinine Clearance: 87.9 mL/min (based on SCr of 0.9 mg/dL).,     Dialysis Method (if applicable):  N/A    CBC (last 72 hours):  Recent Labs   Lab Result Units 12/16/22  0115 12/17/22  0825   WBC K/uL 16.93* 18.95*   Hemoglobin g/dL 11.7* 11.4*   Hematocrit % 35.1* 33.3*   Platelets K/uL 357 358   Gran % % 93.4* 95.3*   Lymph % % 4.2* 2.8*   Mono % % 1.8* 0.9*   Eosinophil % % 0.0 0.2   Basophil % % 0.2 0.1   Differential Method  Automated Automated       Metabolic Panel (last 72 hours):  Recent Labs   Lab Result Units 12/16/22  0115 12/16/22  0346 12/17/22  0825   Sodium mmol/L 136  --  136   Potassium mmol/L 3.4*  --  2.9*   Chloride mmol/L 98  --  99   CO2 mmol/L 24  --  26   Glucose mg/dL 199*  --  172*   Glucose, UA   --  Negative  --    BUN mg/dL 14  --  18   Creatinine mg/dL 0.9  --  0.9   Albumin g/dL 3.0*  --  2.8*   Total Bilirubin mg/dL 1.6*  --  0.8   Alkaline Phosphatase U/L 81   --  91   AST U/L 16  --  12   ALT U/L 8*  --  9*   Magnesium mg/dL  --   --  1.6   Phosphorus mg/dL  --   --  2.2*       Vancomycin Administrations:  vancomycin given in the last 96 hours                     vancomycin 1.25 g in dextrose 5% 250 mL IVPB (ready to mix) (mg) 1,250 mg New Bag 12/17/22 0609     1,250 mg New Bag 12/16/22 2034    vancomycin (VANCOCIN) 2,000 mg in dextrose 5 % 500 mL IVPB (mg) 2,000 mg New Bag 12/16/22 0702                    Microbiologic Results:  Microbiology Results (last 7 days)       Procedure Component Value Units Date/Time    Culture, Respiratory with Gram Stain [[id number]] Collected: 12/16/22 0706    Order Status: Completed Specimen: Sputum Updated: 12/17/22 0800     Respiratory Culture Normal respiratory maksim     Gram Stain (Respiratory) <10 epithelial cells per low power field.     Gram Stain (Respiratory) Few WBC's     Gram Stain (Respiratory) Moderate Gram positive cocci in pairs and clusters    Blood culture #1 **CANNOT BE ORDERED STAT** [[id number]] Collected: 12/16/22 0224    Order Status: Completed Specimen: Blood Updated: 12/17/22 0303     Blood Culture, Routine No Growth to date      No Growth to date    Blood culture #2 **CANNOT BE ORDERED STAT** [[id number]] Collected: 12/16/22 0224    Order Status: Completed Specimen: Blood Updated: 12/17/22 0303     Blood Culture, Routine No Growth to date      No Growth to date
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
## 2022-12-18 NOTE — PLAN OF CARE
Problem: Fall Injury Risk  Goal: Absence of Fall and Fall-Related Injury  Outcome: Ongoing, Progressing     Problem: Thought Process Alteration  Goal: Optimal Thought Clarity  Outcome: Ongoing, Progressing     Problem: ARDS (Acute Respiratory Distress Syndrome)  Goal: Effective Oxygenation  Outcome: Ongoing, Progressing     Problem: Dysrhythmia  Goal: Normalized Cardiac Rhythm  Outcome: Ongoing, Progressing   Pt continually removing tele monitor and at times nasal cannula. Education provided multiple times. Charge nurses notified to possibly move pt closer to nurses station.

## 2022-12-18 NOTE — PLAN OF CARE
Problem: Adult Inpatient Plan of Care  Goal: Plan of Care Review  12/18/2022 0239 by Carmen Garcia RN  Outcome: Ongoing, Progressing  12/18/2022 0215 by Carmen Garcia RN  Outcome: Ongoing, Progressing  Goal: Patient-Specific Goal (Individualized)  12/18/2022 0239 by Carmen Garcia RN  Outcome: Ongoing, Progressing  12/18/2022 0215 by Carmen Garcia RN  Outcome: Ongoing, Progressing  Goal: Absence of Hospital-Acquired Illness or Injury  12/18/2022 0239 by Carmen Garcia RN  Outcome: Ongoing, Progressing  12/18/2022 0215 by Carmen Garcia RN  Outcome: Ongoing, Progressing  Goal: Optimal Comfort and Wellbeing  12/18/2022 0239 by Carmen Garcia RN  Outcome: Ongoing, Progressing  12/18/2022 0215 by Carmen Garcia RN  Outcome: Ongoing, Progressing  Goal: Readiness for Transition of Care  12/18/2022 0239 by Carmen Garcia RN  Outcome: Ongoing, Progressing  12/18/2022 0215 by Carmen Garcia RN  Outcome: Ongoing, Progressing     Problem: Respiratory Compromise (Pneumonia)  Goal: Effective Oxygenation and Ventilation  Outcome: Ongoing, Progressing     Problem: Infection (Pneumonia)  Goal: Resolution of Infection Signs and Symptoms  Outcome: Ongoing, Progressing
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
## 2022-12-18 NOTE — PROGRESS NOTES
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     Patient reports developing worsening shortness of breath and productive cough since he took his last chemotherapy dose on 12/13. He is on 2L O2 at home, and SpO2 readings were as low as 80%, and would not improved despite increasing the O2 to 4L. He has multiple admissions for similar presentation. No longer a smoker. In the ED, the patient was tachycardic, tachypnic and hypoxic requiring 12L O2. Labs showed leukocytosis (16.9), elevated d-dimer (1.94), hypokalemia (3.4), elevated BNP (355), elevated troponin (0.089), elevated lactic acid (2.3). CTA showed no PE, presistent and worsening occlusion of left upper lobe bronchus with superimposed interstitial and alveolar infiltrates., increased ground glass opacities, moderate to large right pleural effusion, small left pleural effusion and other chronic stable changes.  . He was given fluids, vancomycin, zosyn, and DuoNeb x1. The patient was admitted for further management.       Overview/Hospital Course:  Lung Cancer with superimposed Pneumonia. Staph-like organisms growing in sputum, continue broad coverage until results.  Pulmonology consulted, appreciate assessment, will ask IR to tap the new pleural effusion for distinguish meant between malignant effusion, cardiac/HF effusion, or infection- thoracentesis labs  ordered. Multiple hospital admissions the last few months, poor medical understanding. Palliative care consult placed for 12/19.     Apixaban and Plavix received early 12/18 morning, held for thoracentesis thereafter, restart after completed.       MARVALAUAR. Denies CP.   Eating okay. UOP wnl. BM wnl.       Review of Systems   Constitutional: Negative.    HENT: Negative.     Eyes: Negative.    Respiratory:  Positive for cough and shortness of breath.    Cardiovascular: Negative.    Gastrointestinal: Negative.    Endocrine: Negative.    Genitourinary: Negative.    Musculoskeletal: Negative.    Skin: Negative.    Allergic/Immunologic: Negative.    Neurological: Negative.    Psychiatric/Behavioral: Negative.         Objective:     Vital Signs (Most Recent):  Temp: 98.4 °F (36.9 °C) (12/18/22 0435)  Pulse: 95 (12/18/22 0718)  Resp: 18 (12/18/22 0718)  BP: 114/73 (12/18/22 0435)  SpO2: (!) 94 % (12/18/22 0730)   Vital Signs (24h Range):  Temp:  [98 °F (36.7 °C)-98.4 °F (36.9 °C)] 98.4 °F (36.9 °C)  Pulse:  [] 95  Resp:  [18-20] 18  SpO2:  [90 %-97 %] 94 %  BP: (113-140)/(68-83) 114/73     Weight: 87.5 kg (192 lb 14.4 oz)  Body mass index is 27.68 kg/m².    Physical Exam  Vitals and nursing note reviewed.   Constitutional:       General: He is not in acute distress.     Appearance: Normal appearance. He is not ill-appearing.   HENT:      Head: Normocephalic and atraumatic.      Nose: Nose normal.      Mouth/Throat:      Mouth: Mucous membranes are moist.   Eyes:      Extraocular Movements: Extraocular movements intact.   Cardiovascular:      Rate and Rhythm: Normal rate.      Pulses: Normal pulses.      Heart sounds: No murmur heard.  Pulmonary:      Effort: Respiratory distress present.      Comments: Diffusely rhodochrous with faint expiratory wheezes.   Abdominal:      General: Abdomen is flat.      Palpations: Abdomen is soft.      Tenderness: There is no abdominal tenderness.   Musculoskeletal:      Right lower  leg: No edema.      Left lower leg: No edema.   Skin:     General: Skin is warm.      Capillary Refill: Capillary refill takes less than 2 seconds.   Neurological:      General: No focal deficit present.      Mental Status: He is alert.   Psychiatric:         Mood and Affect: Mood normal.               Recent Results (from the past 24 hour(s))   VANCOMYCIN, TROUGH    Collection Time: 12/17/22  5:54 PM   Result Value Ref Range    Vancomycin-Trough 20.6 10.0 - 22.0 ug/mL   Phosphorus    Collection Time: 12/18/22  5:11 AM   Result Value Ref Range    Phosphorus 2.4 (L) 2.7 - 4.5 mg/dL   Magnesium    Collection Time: 12/18/22  5:11 AM   Result Value Ref Range    Magnesium 1.8 1.6 - 2.6 mg/dL   Comprehensive Metabolic Panel    Collection Time: 12/18/22  5:11 AM   Result Value Ref Range    Sodium 136 136 - 145 mmol/L    Potassium 3.1 (L) 3.5 - 5.1 mmol/L    Chloride 101 95 - 110 mmol/L    CO2 25 23 - 29 mmol/L    Glucose 122 (H) 70 - 110 mg/dL    BUN 17 8 - 23 mg/dL    Creatinine 0.9 0.5 - 1.4 mg/dL    Calcium 8.9 8.7 - 10.5 mg/dL    Total Protein 6.3 6.0 - 8.4 g/dL    Albumin 2.8 (L) 3.5 - 5.2 g/dL    Total Bilirubin 0.6 0.1 - 1.0 mg/dL    Alkaline Phosphatase 79 55 - 135 U/L    AST 12 10 - 40 U/L    ALT 12 10 - 44 U/L    Anion Gap 10 8 - 16 mmol/L    eGFR >60 >60 mL/min/1.73 m^2   CBC Auto Differential    Collection Time: 12/18/22  5:11 AM   Result Value Ref Range    WBC 14.98 (H) 3.90 - 12.70 K/uL    RBC 3.54 (L) 4.60 - 6.20 M/uL    Hemoglobin 10.9 (L) 14.0 - 18.0 g/dL    Hematocrit 31.0 (L) 40.0 - 54.0 %    MCV 88 82 - 98 fL    MCH 30.8 27.0 - 31.0 pg    MCHC 35.2 32.0 - 36.0 g/dL    RDW 14.7 (H) 11.5 - 14.5 %    Platelets 337 150 - 450 K/uL    MPV 9.8 9.2 - 12.9 fL    Immature Granulocytes 0.8 (H) 0.0 - 0.5 %    Gran # (ANC) 13.9 (H) 1.8 - 7.7 K/uL    Immature Grans (Abs) 0.12 (H) 0.00 - 0.04 K/uL    Lymph # 0.8 (L) 1.0 - 4.8 K/uL    Mono # 0.2 (L) 0.3 - 1.0 K/uL    Eos # 0.0 0.0 - 0.5 K/uL    Baso # 0.03 0.00 - 0.20  K/uL    nRBC 0 0 /100 WBC    Gran % 92.6 (H) 38.0 - 73.0 %    Lymph % 5.0 (L) 18.0 - 48.0 %    Mono % 1.3 (L) 4.0 - 15.0 %    Eosinophil % 0.1 0.0 - 8.0 %    Basophil % 0.2 0.0 - 1.9 %    Differential Method Automated        Microbiology Results (last 7 days)       Procedure Component Value Units Date/Time    Gram stain [[id number]]     Order Status: No result Specimen: Pleural Fluid     Aerobic culture [[id number]]     Order Status: No result Specimen: Pleural Fluid     Culture, Anaerobic [[id number]]     Order Status: No result Specimen: Pleural Fluid     Culture, Respiratory with Gram Stain [[id number]] Collected: 12/16/22 0706    Order Status: Completed Specimen: Sputum Updated: 12/18/22 0548     Respiratory Culture Normal respiratory maksim     Gram Stain (Respiratory) <10 epithelial cells per low power field.     Gram Stain (Respiratory) Few WBC's     Gram Stain (Respiratory) Moderate Gram positive cocci in pairs and clusters    Blood culture #1 **CANNOT BE ORDERED STAT** [[id number]] Collected: 12/16/22 0224    Order Status: Completed Specimen: Blood Updated: 12/18/22 0303     Blood Culture, Routine No Growth to date      No Growth to date      No Growth to date    Blood culture #2 **CANNOT BE ORDERED STAT** [[id number]] Collected: 12/16/22 0224    Order Status: Completed Specimen: Blood Updated: 12/18/22 0303     Blood Culture, Routine No Growth to date      No Growth to date      No Growth to date             Imaging Results               CTA Chest Non-Coronary (PE Studies) (Final result)  Result time 12/16/22 03:55:50      Final result by Jordin Sofia MD (12/16/22 03:55:50)                   Impression:      There is no large central saddle type pulmonary embolus, when accounting for limitations of the examination there is no additional evidence for pulmonary embolus on this exam.    Persistent abnormal appearance of the left upper lobe with suspected occlusion of the left upper lobe bronchus, and volume  loss of the left upper lobe however there is progression of abnormality, with increased pattern of interstitial and alveolar opacity, which may relate to superimposed interstitial and alveolar infiltrates/airspace disease and consolidative change and volume loss.    The lungs bilaterally demonstrate appearance of increased interstitial and ground-glass opacity with additional areas of confluent infiltrates/airspace disease having developed at the left lower lobe inferiorly.    Previously identified opacity that may relate to chronic and consolidative change of the left upper lobe posterior medially appears similar to the prior study.    Mild opacity within the trachea layering posteriorly superior to the jonnathan may relate to secretions or aspiration.  Bilateral peribronchial thickening is also noted.    Moderate to large right pleural effusion, small left pleural effusion.  Small pericardial effusion.    Left supraclavicular lesion again noted, not well evaluated on this examination as discussed above, similarly the previously identified left upper lobe nodule is obscured due to the findings of the left upper lobe at this time.    Additional findings as above.    This report was flagged in Epic as abnormal.      Electronically signed by: Jordin Sofia  Date:    12/16/2022  Time:    03:55               Narrative:    EXAMINATION:  CTA CHEST NON CORONARY (PE STUDIES)    CLINICAL HISTORY:  Pulmonary embolism (PE) suspected, positive D-dimer;    TECHNIQUE:  Low dose axial images, sagittal and coronal reformations were obtained from the thoracic inlet to the lung bases following the IV administration of 75 mL of Omnipaque 350.  Contrast timing was optimized to evaluate the pulmonary arteries.  MIP images were performed.    COMPARISON:  Prior examinations, most recent of which is CT examination of the chest November 18, 2022    FINDINGS:  There is no large central saddle type pulmonary embolus.  When accounting for  artifact the pulmonary arterial vascular demonstrate opacification without evidence for abnormal pattern of pulmonary arterial filling defects specific for pulmonary emboli.    There is a right-sided central venous catheter noted, the distal tip extends to the SVC.  Better demonstrated on the prior examination there is a supraclavicular/retroclavicular soft tissue lesion, consistent with a soft tissue mass or enlarged adenopathy, this is difficult to accurately measure on this exam, as the examination was optimized for evaluation of the pulmonary arterial vasculature rather than soft tissues.    There is a moderate to large pleural effusion on the right, and a small pleural effusion on the left, this is seen as an interval change.  There is also a small pericardial effusion.  The thoracic aorta demonstrates appropriate opacification.  Atherosclerotic change noted.    The lungs demonstrate chronic change with emphysematous change noted.  The right hemithorax demonstrates appearance of interstitial infiltrate/edema and ground-glass infiltrate throughout the right hemithorax, there is atelectasis noted, most notably at the right lower lobe, associated with the aforementioned pleural effusion.    As previously noted there is appearance thought to represent occlusion of the left upper lobe bronchus.  There is volume loss of the left upper lobe, there is increased pattern of interstitial as well as alveolar opacity, prominent confluent opacity may relate to components of consolidation, and volume loss.  The previously identified nodule at the left upper lobe medially is obscured by the aforementioned.  The left lower lobe demonstrates interstitial edema/infiltrate, as well as a diffuse pattern of ground-glass infiltrate.  There is an area of superimposed opacity consistent with consolidative change posteriorly medially appearing similar to the prior examination.  There is additional appearance of confluent  infiltrates/airspace disease at the left lung base.    There is mild opacity layering posteriorly within the trachea just above the level of the jonnathan this may relate to secretions or aspiration.  There is peribronchial thickening bilaterally most notably at the lung bases.  There is no evidence for pneumothorax.    Limited imaging of the upper abdomen demonstrates no evidence for acute upper abdominal process.  The visualized osseous structures demonstrate chronic change.                                       X-Ray Chest 1 View (Final result)  Result time 12/16/22 01:02:44      Final result by Luis Enrique Schroeder MD (12/16/22 01:02:44)                   Impression:      Stable pleural and parenchymal opacities and Port-A-Cath with no acute findings radiographically.      Electronically signed by: Luis Enrique Schroeder  Date:    12/16/2022  Time:    01:02               Narrative:    EXAMINATION:  XR CHEST 1 VIEW    CLINICAL HISTORY:  shortness of breath;    TECHNIQUE:  Single frontal view of the chest was performed.    COMPARISON:  11/03/2002    FINDINGS:  Pleural and parenchymal opacities do not appear significantly changed.  Port-A-Cath remains in position.  Bones remain intact.  The heart mediastinal contours are stable.                                            Assessment/Plan:      * Acute on chronic respiratory failure with hypoxia  Patient with Hypoxic Respiratory failure which is Acute on chronic.  he is on home oxygen at 2 LPM. Supplemental oxygen was provided and noted- Oxygen Concentration (%):  [100] 100.   Signs/symptoms of respiratory failure include- increased work of breathing and respiratory distress. Contributing diagnoses includes - COPD and Pneumonia Labs and images were reviewed. Patient Has recent ABG, which has been reviewed. Will treat underlying causes and adjust management of respiratory failure as follows- see plans for HAP & COPD exacerbation       Pleural effusion  Pulmonology consulted,  appreciate assessment:    Present on chest CTA from 12/17 but not present on most recent CT from a month before.  History of diastolic CHF, as well as lung cancer with metastases to right-sided ribs.   Given the timeframe of CT changes, I suspect this is likely due to CHF (probably diastolic) rather than infection or cancer.  However, given known cancer and immunosuppression from chemotherapy, I think that right thoracentesis is appropriate when anticoagulation can be safely held.    Cardiac/HF effusion vs Infx/parapneumonic effusion vs malignant effusion  Will ask IR to tap the new pleural effusion for distinguish   Thoracentesis labs ordered.   Multiple hospital admissions the last few months, poor medical understanding- Palliative care consult placed for 12/19.    Persistent atrial fibrillation  [id number] Score: 1.  Resume home medications     Chronic obstructive pulmonary disease with acute exacerbation  - History of COPD on supplemental O2, 2L NC at home   - Presented with worsening SOB   - Likely related to a COPD exacerbation in the setting of pneumonia      Plan:   - Continue steroids.  - Duo-Nebs scheduled  - Wean O2 as tolerated    Coronary artery disease involving native coronary artery of native heart without angina pectoris  Denies chest pain  Resume aspirin, plavix, and statin      PAD (peripheral artery disease)  Continue home medications       Chronic diastolic heart failure  -Echocardiogram (2/22/2022): EF: 35%-40%, GIDD, normal RV systolic function, mild TR   -Echocardiogram (5/18/2022): EF: 65%, indeterminate LV diastolic function, mild TR, PA pressure of 54 mmHg  -Echocardiogram (9/30/2022): EF: 60%, mildly reduced RV systolic function, PA pressure of 40 mmHg.   -Elevated BNP, around baseline   -Doubt exacerbation   -Resume home Lasix       Essential hypertension  Resume home medications       Lung cancer, main bronchus, left  On maintenance chemotherapy and radiation   Outpatient follow up with  palliative care/oncology       HAP (hospital-acquired pneumonia)  Presented with SOB, cough   CTA showed no PE, presistent and worsening occlusion of left upper lobe bronchus with superimposed interstitial and alveolar infiltrates., increased ground glass opacities, moderate to large right pleural effusion, small left pleural effusion and other chronic stable change.   Concern for HAP/post obstructive pneumonia    Plan:   - Vancomycin, zosyn and doxycyline   - Respiratory cultures   - Wean O2 as tolerated       VTE Risk Mitigation (From admission, onward)           Ordered     IP VTE HIGH RISK PATIENT  Once         12/16/22 0435     Place sequential compression device  Until discontinued         12/16/22 0435                    Discharge Planning   AZ: 12/19/2022     Code Status: Full Code   Is the patient medically ready for discharge?:     Reason for patient still in hospital (select all that apply): Patient trending condition and Treatment  Discharge Plan A: Home with family                  Neel Dupree MD  Department of Hospital Medicine   [facility] [place] Telemetry

## 2022-12-18 NOTE — ASSESSMENT & PLAN NOTE
Most recent dose of gemcitabine on 12/13/2022.    Given short time interval between CT scans, I doubt that the radiographic changes in the right lung reflect cancer.  I am most suspicious of pulmonary edema (diastolic) but agree that antibiotics make sense for now.

## 2022-12-18 NOTE — ASSESSMENT & PLAN NOTE
Given the timeframe of CT changes, I suspect this is likely due to CHF (probably diastolic) rather than infection or cancer.  However, given known cancer and immunosuppression from chemotherapy, I think that right thoracentesis is appropriate when anticoagulation can be safely held.    · Ask IR to perform right thoracentesis after holding apixaban.  · Consider a trial of diuresis in the meantime.

## 2022-12-18 NOTE — PLAN OF CARE
Problem: Adult Inpatient Plan of Care  Goal: Plan of Care Review  Outcome: Ongoing, Progressing  Goal: Patient-Specific Goal (Individualized)  Outcome: Ongoing, Progressing  Goal: Absence of Hospital-Acquired Illness or Injury  Outcome: Ongoing, Progressing  Goal: Optimal Comfort and Wellbeing  Outcome: Ongoing, Progressing  Goal: Readiness for Transition of Care  Outcome: Ongoing, Progressing     Problem: Respiratory Compromise (Pneumonia)  Goal: Effective Oxygenation and Ventilation  Outcome: Ongoing, Progressing     Problem: Skin Injury Risk Increased  Goal: Skin Health and Integrity  Outcome: Ongoing, Progressing  Intervention: Optimize Skin Protection  Flowsheets (Taken 12/18/2022 0206)  Pressure Reduction Techniques: frequent weight shift encouraged  Skin Protection: adhesive use limited  Head of Bed (HOB) Positioning: HOB elevated     Problem: Fall Injury Risk  Goal: Absence of Fall and Fall-Related Injury  Intervention: Identify and Manage Contributors  Flowsheets (Taken 12/18/2022 0215)  Self-Care Promotion: BADL personal objects within reach  Medication Review/Management: medications reviewed

## 2022-12-18 NOTE — PROCEDURES
"Kashif Iverson is a 62 y.o. male patient.    Temp: 98.4 °F (36.9 °C) (12/18/22 0435)  Pulse: 100 (12/18/22 0435)  Resp: 19 (12/18/22 0435)  BP: 114/73 (12/18/22 0435)  SpO2: (!) 94 % (12/18/22 0435)  Weight: 87.5 kg (192 lb 14.4 oz) (12/16/22 1948)  Height: 5' 10" (177.8 cm) (12/16/22 1948)    PICC  Date/Time: 12/18/2022 6:26 AM  Consent Done: Yes  Time out: Immediately prior to procedure a time out was called to verify the correct patient, procedure, equipment, support staff and site/side marked as required  Indications: med administration and vascular access  Anesthesia: local infiltration  Local anesthetic: lidocaine 1% without epinephrine  Anesthetic Total (mL): 3  Preparation: skin prepped with ChloraPrep  Skin prep agent dried: skin prep agent completely dried prior to procedure  Sterile barriers: all five maximum sterile barriers used - cap, mask, sterile gown, sterile gloves, and large sterile sheet  Hand hygiene: hand hygiene performed prior to central venous catheter insertion  Location details: right brachial  Catheter type: single lumen  Catheter size: 3 Fr  Catheter Length: 10cm    Ultrasound guidance: yes  Vessel Caliber: medium and patent, compressibility normal  Vascular Doppler: not done  Needle advanced into vessel with real time Ultrasound guidance.  Sterile sheath used.  no esophageal manometryNumber of attempts: 1  Post-procedure: blood return through all ports, chlorhexidine patch and sterile dressing applied  Estimated blood loss (mL): 0.5  Specimens: No  Implants: No  Assessment: successful placement  Complications: none        Name NEERAJ Sanders  12/18/2022    "

## 2022-12-18 NOTE — ASSESSMENT & PLAN NOTE
Patient with Hypoxic Respiratory failure which is Acute on chronic.  he is on home oxygen at 1-2 LPM. Supplemental oxygen was provided and noted-  .   Signs/symptoms of respiratory failure include- tachypnea and increased work of breathing. Contributing diagnoses includes - COPD and Pleural effusion Labs and images were reviewed. Patient Has recent ABG, which has been reviewed. Will treat underlying causes and adjust management of respiratory failure as follows-     Most likely multifactorial due to combination of diastolic CHF, chronic COPD, possible pneumonia, and known lung cancer with complications of treatment.    · Ask IR to tap right pleural effusion after holding apixaban.  · Bronchodilators for COPD  · Diuresis as tolerated  · Empiric antibiotics for now  · Currently on high flow nasal cannula oxygen => much higher than baseline at home.

## 2022-12-18 NOTE — HPI
Asked to evaluate pleural effusion in the setting of known lung cancer with recurrent admissions for shortness of breath.    Mr. Iverson is once admitted for progressive shortness of breath.  From chart review, it looks like he has been in the hospital almost monthly for the last 6 months or so.  Prior presentations have been attributed to CHF, COPD, and/or pneumonia.  He has been receiving chemotherapy (currently Gemzar) with most recent dose on 12/13.  He was diagnosed with squamous cell lung cancer of the left mainstem bronchus in 2019.  He has been treated with several courses of chemotherapy, as well as XRT to the left mainstem bronchus.  He has seen several different oncologists and is now following with Dr. Higgins here at [facility].    Evaluation in the ED showed acceptable gas exchange with no CO2 retention on arterial blood gas.  Chest CTA shows right pleural effusion with scattered ground glass changes in the right lung and chronic changes throughout the left lung likely secondary to XRT and lung cancer.  When compared to the most recent CT from November 2022, the right side changes are all new, as is the pleural effusion.    Echocardiogram in Spring 2022 showed systolic LV dysfunction but subsequent echocardiograms have shown normal LVEF.  I wonder if the prior echo findings reflect cardiac toxicity of chemotherapy/immunotherapy.

## 2022-12-18 NOTE — SUBJECTIVE & OBJECTIVE
YASMEEN. Denies CP.   Eating okay. UOP wnl. BM wnl.       Review of Systems   Constitutional: Negative.    HENT: Negative.     Eyes: Negative.    Respiratory:  Positive for cough and shortness of breath.    Cardiovascular: Negative.    Gastrointestinal: Negative.    Endocrine: Negative.    Genitourinary: Negative.    Musculoskeletal: Negative.    Skin: Negative.    Allergic/Immunologic: Negative.    Neurological: Negative.    Psychiatric/Behavioral: Negative.         Objective:     Vital Signs (Most Recent):  Temp: 98.4 °F (36.9 °C) (12/18/22 0435)  Pulse: 95 (12/18/22 0718)  Resp: 18 (12/18/22 0718)  BP: 114/73 (12/18/22 0435)  SpO2: (!) 94 % (12/18/22 0730)   Vital Signs (24h Range):  Temp:  [98 °F (36.7 °C)-98.4 °F (36.9 °C)] 98.4 °F (36.9 °C)  Pulse:  [] 95  Resp:  [18-20] 18  SpO2:  [90 %-97 %] 94 %  BP: (113-140)/(68-83) 114/73     Weight: 87.5 kg (192 lb 14.4 oz)  Body mass index is 27.68 kg/m².    Physical Exam  Vitals and nursing note reviewed.   Constitutional:       General: He is not in acute distress.     Appearance: Normal appearance. He is not ill-appearing.   HENT:      Head: Normocephalic and atraumatic.      Nose: Nose normal.      Mouth/Throat:      Mouth: Mucous membranes are moist.   Eyes:      Extraocular Movements: Extraocular movements intact.   Cardiovascular:      Rate and Rhythm: Normal rate.      Pulses: Normal pulses.      Heart sounds: No murmur heard.  Pulmonary:      Effort: Respiratory distress present.      Comments: Diffusely rhodochrous with faint expiratory wheezes.   Abdominal:      General: Abdomen is flat.      Palpations: Abdomen is soft.      Tenderness: There is no abdominal tenderness.   Musculoskeletal:      Right lower leg: No edema.      Left lower leg: No edema.   Skin:     General: Skin is warm.      Capillary Refill: Capillary refill takes less than 2 seconds.   Neurological:      General: No focal deficit present.      Mental Status: He is alert.   Psychiatric:          Mood and Affect: Mood normal.               Recent Results (from the past 24 hour(s))   VANCOMYCIN, TROUGH    Collection Time: 12/17/22  5:54 PM   Result Value Ref Range    Vancomycin-Trough 20.6 10.0 - 22.0 ug/mL   Phosphorus    Collection Time: 12/18/22  5:11 AM   Result Value Ref Range    Phosphorus 2.4 (L) 2.7 - 4.5 mg/dL   Magnesium    Collection Time: 12/18/22  5:11 AM   Result Value Ref Range    Magnesium 1.8 1.6 - 2.6 mg/dL   Comprehensive Metabolic Panel    Collection Time: 12/18/22  5:11 AM   Result Value Ref Range    Sodium 136 136 - 145 mmol/L    Potassium 3.1 (L) 3.5 - 5.1 mmol/L    Chloride 101 95 - 110 mmol/L    CO2 25 23 - 29 mmol/L    Glucose 122 (H) 70 - 110 mg/dL    BUN 17 8 - 23 mg/dL    Creatinine 0.9 0.5 - 1.4 mg/dL    Calcium 8.9 8.7 - 10.5 mg/dL    Total Protein 6.3 6.0 - 8.4 g/dL    Albumin 2.8 (L) 3.5 - 5.2 g/dL    Total Bilirubin 0.6 0.1 - 1.0 mg/dL    Alkaline Phosphatase 79 55 - 135 U/L    AST 12 10 - 40 U/L    ALT 12 10 - 44 U/L    Anion Gap 10 8 - 16 mmol/L    eGFR >60 >60 mL/min/1.73 m^2   CBC Auto Differential    Collection Time: 12/18/22  5:11 AM   Result Value Ref Range    WBC 14.98 (H) 3.90 - 12.70 K/uL    RBC 3.54 (L) 4.60 - 6.20 M/uL    Hemoglobin 10.9 (L) 14.0 - 18.0 g/dL    Hematocrit 31.0 (L) 40.0 - 54.0 %    MCV 88 82 - 98 fL    MCH 30.8 27.0 - 31.0 pg    MCHC 35.2 32.0 - 36.0 g/dL    RDW 14.7 (H) 11.5 - 14.5 %    Platelets 337 150 - 450 K/uL    MPV 9.8 9.2 - 12.9 fL    Immature Granulocytes 0.8 (H) 0.0 - 0.5 %    Gran # (ANC) 13.9 (H) 1.8 - 7.7 K/uL    Immature Grans (Abs) 0.12 (H) 0.00 - 0.04 K/uL    Lymph # 0.8 (L) 1.0 - 4.8 K/uL    Mono # 0.2 (L) 0.3 - 1.0 K/uL    Eos # 0.0 0.0 - 0.5 K/uL    Baso # 0.03 0.00 - 0.20 K/uL    nRBC 0 0 /100 WBC    Gran % 92.6 (H) 38.0 - 73.0 %    Lymph % 5.0 (L) 18.0 - 48.0 %    Mono % 1.3 (L) 4.0 - 15.0 %    Eosinophil % 0.1 0.0 - 8.0 %    Basophil % 0.2 0.0 - 1.9 %    Differential Method Automated        Microbiology Results (last 7  days)       Procedure Component Value Units Date/Time    Gram stain [[id number]]     Order Status: No result Specimen: Pleural Fluid     Aerobic culture [[id number]]     Order Status: No result Specimen: Pleural Fluid     Culture, Anaerobic [[id number]]     Order Status: No result Specimen: Pleural Fluid     Culture, Respiratory with Gram Stain [[id number]] Collected: 12/16/22 0706    Order Status: Completed Specimen: Sputum Updated: 12/18/22 0548     Respiratory Culture Normal respiratory maksim     Gram Stain (Respiratory) <10 epithelial cells per low power field.     Gram Stain (Respiratory) Few WBC's     Gram Stain (Respiratory) Moderate Gram positive cocci in pairs and clusters    Blood culture #1 **CANNOT BE ORDERED STAT** [[id number]] Collected: 12/16/22 0224    Order Status: Completed Specimen: Blood Updated: 12/18/22 0303     Blood Culture, Routine No Growth to date      No Growth to date      No Growth to date    Blood culture #2 **CANNOT BE ORDERED STAT** [[id number]] Collected: 12/16/22 0224    Order Status: Completed Specimen: Blood Updated: 12/18/22 0303     Blood Culture, Routine No Growth to date      No Growth to date      No Growth to date             Imaging Results               CTA Chest Non-Coronary (PE Studies) (Final result)  Result time 12/16/22 03:55:50      Final result by Jordin Sofia MD (12/16/22 03:55:50)                   Impression:      There is no large central saddle type pulmonary embolus, when accounting for limitations of the examination there is no additional evidence for pulmonary embolus on this exam.    Persistent abnormal appearance of the left upper lobe with suspected occlusion of the left upper lobe bronchus, and volume loss of the left upper lobe however there is progression of abnormality, with increased pattern of interstitial and alveolar opacity, which may relate to superimposed interstitial and alveolar infiltrates/airspace disease and consolidative change and  volume loss.    The lungs bilaterally demonstrate appearance of increased interstitial and ground-glass opacity with additional areas of confluent infiltrates/airspace disease having developed at the left lower lobe inferiorly.    Previously identified opacity that may relate to chronic and consolidative change of the left upper lobe posterior medially appears similar to the prior study.    Mild opacity within the trachea layering posteriorly superior to the jonnathan may relate to secretions or aspiration.  Bilateral peribronchial thickening is also noted.    Moderate to large right pleural effusion, small left pleural effusion.  Small pericardial effusion.    Left supraclavicular lesion again noted, not well evaluated on this examination as discussed above, similarly the previously identified left upper lobe nodule is obscured due to the findings of the left upper lobe at this time.    Additional findings as above.    This report was flagged in Epic as abnormal.      Electronically signed by: Jordin Sofia  Date:    12/16/2022  Time:    03:55               Narrative:    EXAMINATION:  CTA CHEST NON CORONARY (PE STUDIES)    CLINICAL HISTORY:  Pulmonary embolism (PE) suspected, positive D-dimer;    TECHNIQUE:  Low dose axial images, sagittal and coronal reformations were obtained from the thoracic inlet to the lung bases following the IV administration of 75 mL of Omnipaque 350.  Contrast timing was optimized to evaluate the pulmonary arteries.  MIP images were performed.    COMPARISON:  Prior examinations, most recent of which is CT examination of the chest November 18, 2022    FINDINGS:  There is no large central saddle type pulmonary embolus.  When accounting for artifact the pulmonary arterial vascular demonstrate opacification without evidence for abnormal pattern of pulmonary arterial filling defects specific for pulmonary emboli.    There is a right-sided central venous catheter noted, the distal tip extends to  the SVC.  Better demonstrated on the prior examination there is a supraclavicular/retroclavicular soft tissue lesion, consistent with a soft tissue mass or enlarged adenopathy, this is difficult to accurately measure on this exam, as the examination was optimized for evaluation of the pulmonary arterial vasculature rather than soft tissues.    There is a moderate to large pleural effusion on the right, and a small pleural effusion on the left, this is seen as an interval change.  There is also a small pericardial effusion.  The thoracic aorta demonstrates appropriate opacification.  Atherosclerotic change noted.    The lungs demonstrate chronic change with emphysematous change noted.  The right hemithorax demonstrates appearance of interstitial infiltrate/edema and ground-glass infiltrate throughout the right hemithorax, there is atelectasis noted, most notably at the right lower lobe, associated with the aforementioned pleural effusion.    As previously noted there is appearance thought to represent occlusion of the left upper lobe bronchus.  There is volume loss of the left upper lobe, there is increased pattern of interstitial as well as alveolar opacity, prominent confluent opacity may relate to components of consolidation, and volume loss.  The previously identified nodule at the left upper lobe medially is obscured by the aforementioned.  The left lower lobe demonstrates interstitial edema/infiltrate, as well as a diffuse pattern of ground-glass infiltrate.  There is an area of superimposed opacity consistent with consolidative change posteriorly medially appearing similar to the prior examination.  There is additional appearance of confluent infiltrates/airspace disease at the left lung base.    There is mild opacity layering posteriorly within the trachea just above the level of the jonnathan this may relate to secretions or aspiration.  There is peribronchial thickening bilaterally most notably at the lung  bases.  There is no evidence for pneumothorax.    Limited imaging of the upper abdomen demonstrates no evidence for acute upper abdominal process.  The visualized osseous structures demonstrate chronic change.                                       X-Ray Chest 1 View (Final result)  Result time 12/16/22 01:02:44      Final result by Luis Enrique Schroeder MD (12/16/22 01:02:44)                   Impression:      Stable pleural and parenchymal opacities and Port-A-Cath with no acute findings radiographically.      Electronically signed by: Luis Enrique Schroeder  Date:    12/16/2022  Time:    01:02               Narrative:    EXAMINATION:  XR CHEST 1 VIEW    CLINICAL HISTORY:  shortness of breath;    TECHNIQUE:  Single frontal view of the chest was performed.    COMPARISON:  11/03/2002    FINDINGS:  Pleural and parenchymal opacities do not appear significantly changed.  Port-A-Cath remains in position.  Bones remain intact.  The heart mediastinal contours are stable.

## 2022-12-18 NOTE — ASSESSMENT & PLAN NOTE
Given new right pleural effusion, modest increase in natriuretic peptide, and history of CHF => I suspect this is more likely acutely decompensated diastolic CHF.     06/10/22 14:35 09/04/22 09:50 09/28/22 16:16 11/03/22 14:34 12/16/22 01:15    (H) 173 (H) 447 (H) 271 (H) 355 (H)         · Ask IR to perform right thoracentesis with appropriate fluid studies and cytology -- after holding apixaban.  · Diuresis as tolerated.

## 2022-12-18 NOTE — ASSESSMENT & PLAN NOTE
Pulmonology consulted, appreciate assessment:    Present on chest CTA from 12/17 but not present on most recent CT from a month before.  History of diastolic CHF, as well as lung cancer with metastases to right-sided ribs.   Given the timeframe of CT changes, I suspect this is likely due to CHF (probably diastolic) rather than infection or cancer.  However, given known cancer and immunosuppression from chemotherapy, I think that right thoracentesis is appropriate when anticoagulation can be safely held.    · Cardiac/HF effusion vs Infx/parapneumonic effusion vs malignant effusion  · Will ask IR to tap the new pleural effusion for distinguish   · Thoracentesis labs ordered.   · Multiple hospital admissions the last few months, poor medical understanding- Palliative care consult placed for 12/19.

## 2022-12-18 NOTE — NURSING
Received report from NEERAJ Morales. Patient lying in bed resting, NAD noted. Safety Precautions maintained, Will Monitor.

## 2022-12-19 NOTE — PLAN OF CARE
[facility] - Telemetry  Discharge Reassessment    Primary Care Provider: Eduardo Ruiz MD    Expected Discharge Date: 12/19/2022    Reassessment (most recent)       Discharge Reassessment - 12/19/22 1456          Discharge Reassessment    Assessment Type Discharge Planning Reassessment     Discharge Plan discussed with: Patient     Communicated [place] with patient/caregiver Date not available/Unable to determine     Discharge Plan A Home with family     Discharge Plan B Other   TBD    DME Needed Upon Discharge  other (see comments)   TBD    Discharge Barriers Identified None     Why the patient remains in the hospital Requires continued medical care        Post-Acute Status    Coverage Medicare A/B     Discharge Delays None known at this time                   SW met with patient at bedside. Patient still plans to return home and spouse will be his help at home.

## 2022-12-19 NOTE — H&P
Inpatient Radiology Pre-procedure Note    History of Present Illness:  Kashif Iverson is a 62 y.o. male who presents for right thoracentesis.  Admission H&P reviewed.  Past Medical History:   Diagnosis Date    Chronic obstructive pulmonary disease with acute exacerbation 9/5/2022    Combined systolic and diastolic heart failure     Dependence on supplemental oxygen 5/24/2022    History of completed stroke 9/3/2019     09/03/2019 On CT exam    History of non-ST elevation myocardial infarction (NSTEMI) 2/22/2022    Hypertension     Lung cancer     NSCLC    Lung mass     left lung    Macrocytic anemia 8/24/2019    PAD (peripheral artery disease) 3/14/2022    LUIS FELIPE 3/11/22    Peripheral artery disease      Past Surgical History:   Procedure Laterality Date    BRONCHOSCOPY N/A 08/30/2019    Procedure: Bronchoscopy;  Surgeon: [facility] Diagnostic Provider;  Location: [facility] OR [address] [place] [address];  Service: Anesthesiology;  Laterality: N/A;    CORONARY ANGIOGRAPHY N/A 03/04/2022    Procedure: ANGIOGRAM, CORONARY ARTERY;  Surgeon: Robson Green MD;  Location: [facility] CATH LAB;  Service: Cardiology;  Laterality: N/A;    INSERTION OF TUNNELED CENTRAL VENOUS CATHETER (CVC) WITH SUBCUTANEOUS PORT         Review of Systems:   As documented in primary team H&P    Home Meds:   Prior to Admission medications    Medication Sig Start Date End Date Taking? Authorizing Provider   albuterol (VENTOLIN HFA) 90 mcg/actuation inhaler Inhale 2 puffs into the lungs every 6 (six) hours as needed for Wheezing. Rescue 9/6/22 9/6/23  Sidney Uribe MD   albuterol-ipratropium (DUO-NEB) 2.5 mg-0.5 mg/3 mL nebulizer solution Inhale contents of 1 vial (3 mLs) by nebulization every 4 (four) hours as needed for Wheezing or Shortness of Breath. Rescue 11/9/22 11/9/23  Raphael Hathaway MD   amLODIPine (NORVASC) 10 MG tablet Take 1 tablet (10 mg total) by mouth once daily. 10/11/22   Catia Roman MD   apixaban (ELIQUIS) 5 mg Tab Take 1 tablet (5 mg total) by mouth 2  (two) times daily. 11/9/22   Raphael Hathaway MD   ascorbic acid-multivit-min (VITAMIN C ENERGY BOOSTER) 1,000 mg PwEP Take 3,000 mg by mouth once daily. Patient takes 3,000 mg per day    Historical Provider   aspirin 81 MG Chew Take 1 tablet (81 mg total) by mouth once daily. 3/5/22 3/5/23  Rolando Knott MD   atorvastatin (LIPITOR) 80 MG tablet Take 1 tablet (80 mg total) by mouth once daily. 3/5/22 3/5/23  Rolando Knott MD   cilostazoL (PLETAL) 100 MG Tab Take 1 tablet (100 mg total) by mouth 2 (two) times daily. 5/24/22 5/24/23  Catia Roman MD   clopidogreL (PLAVIX) 75 mg tablet Take 1 tablet (75 mg total) by mouth once daily. 3/5/22 3/5/23  Rolando Knott MD   dronabinoL (MARINOL) 5 MG capsule Take 1 capsule (5 mg total) by mouth 2 (two) times daily before meals. for 14 days 12/12/22 12/26/22  Guerline Higgins MD   fluticasone-salmeterol diskus inhaler 100-50 mcg Inhale 1 puff into the lungs 2 (two) times daily. Controller 11/9/22 11/9/23  Raphael Hathaway MD   furosemide (LASIX) 40 MG tablet Take 1 tablet (40 mg total) by mouth 2 (two) times a day. 6/11/22 11/3/22  Marlon Nath MD   HYDROcodone-acetaminophen (NORCO) 7.5-325 mg per tablet Take 1 tablet by mouth every 4 (four) hours as needed. for pain. 9/25/22   Historical Provider   polyethylene glycol (GLYCOLAX) 17 gram/dose powder Mix 1 capful (17 grams total) with a liquid and take by mouth once daily. 11/9/22   Raphael Hathaway MD   rivaroxaban (XARELTO) 20 mg Tab Take 1 tablet (20 mg total) by mouth daily with dinner or evening meal. 12/1/22   Raphael Hathaway MD   tiotropium (SPIRIVA) 18 mcg inhalation capsule Inhale 1 capsule (18 mcg total) into the lungs via handihaler once daily. Controller 11/9/22 11/9/23  Raphael Hathaway MD     Scheduled Meds:    albuterol-ipratropium  3 mL Nebulization Q4H    amLODIPine  10 mg Oral Daily    atorvastatin  80 mg Oral Daily    cefTRIAXone (ROCEPHIN) IVPB  2 g Intravenous Q24H    cilostazoL  100 mg Oral BID     doxycycline  100 mg Oral Q12H    furosemide  40 mg Oral BID    guaiFENesin  1,200 mg Oral BID    metoprolol tartrate  50 mg Oral QID    polyethylene glycol  17 g Oral Daily    vancomycin (VANCOCIN) IVPB  1,000 mg Intravenous Q12H     Continuous Infusions:   PRN Meds:acetaminophen, acetaminophen, albuterol-ipratropium, aluminum-magnesium hydroxide-simethicone, benzonatate, bisacodyL, dextrose 10%, dextrose 10%, glucagon (human recombinant), glucose, glucose, HYDROcodone-acetaminophen, magnesium oxide, magnesium oxide, melatonin, naloxone, ondansetron, potassium bicarbonate, potassium bicarbonate, potassium bicarbonate, potassium, sodium phosphates, potassium, sodium phosphates, potassium, sodium phosphates, prochlorperazine, simethicone, sodium chloride 0.9%, Pharmacy to dose Vancomycin consult **AND** vancomycin - pharmacy to dose  Anticoagulants/Antiplatelets:  apixaban held today    Allergies: Review of patient's allergies indicates:  No Known Allergies  Sedation Hx: have not been any systemic reactions    Labs:  No results for input(s): INR in the last 168 hours.    Invalid input(s):  PT,  PTT    Recent Labs   Lab 12/19/22  0504   WBC 6.94   HGB 10.0*   HCT 29.3*   MCV 89         Recent Labs   Lab 12/19/22  0504   *      K 4.0      CO2 26   BUN 18   CREATININE 0.9   CALCIUM 8.7   MG 1.9   ALT 10   AST 12   ALBUMIN 2.5*   BILITOT 0.4         Vitals:  Temp: 97.9 °F (36.6 °C) (12/19/22 0509)  Pulse: 82 (12/19/22 0653)  Resp: (!) 21 (12/19/22 0653)  BP: 115/68 (12/19/22 0509)  SpO2: 97 % (12/19/22 0653)     Physical Exam:  ASA: 3  Mallampati: N/A    General: no acute distress  Mental Status: alert and oriented to person, place and time  HEENT: normocephalic, atraumatic  Chest: unlabored breathing, on oxygen via nasal cannula, c/o SOB with exertion  Heart: slightly irregular heart rate  Abdomen: nondistended  Extremity: moves all extremities    Plan: proceed with right thoracentesis  Sedation  Plan: local anesthesia only    Montrell Hsu MD  Staff Radiologist  Department of Radiology  Pager: [phone] l

## 2022-12-19 NOTE — NURSING
Patient to IR for thoracentecis via bed as ordered, wife Natty accompanying. Patient awake, alert, oriented without c/o discomfort at this time, wearing 12 L O2 high flow, sats mid 90s. Tele monitor notified patient leaving unit. No apparent distress noted.

## 2022-12-19 NOTE — PROGRESS NOTES
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     Patient reports developing worsening shortness of breath and productive cough since he took his last chemotherapy dose on 12/13. He is on 2L O2 at home, and SpO2 readings were as low as 80%, and would not improved despite increasing the O2 to 4L. He has multiple admissions for similar presentation. No longer a smoker. In the ED, the patient was tachycardic, tachypnic and hypoxic requiring 12L O2. Labs showed leukocytosis (16.9), elevated d-dimer (1.94), hypokalemia (3.4), elevated BNP (355), elevated troponin (0.089), elevated lactic acid (2.3). CTA showed no PE, presistent and worsening occlusion of left upper lobe bronchus with superimposed interstitial and alveolar infiltrates., increased ground glass opacities, moderate to large right pleural effusion, small left pleural effusion and other chronic stable changes.  . He was given fluids, vancomycin, zosyn, and DuoNeb x1. The patient was admitted for further management.       Overview/Hospital Course:  Lung Cancer with superimposed Pneumonia. Staph-like organisms growing in sputum, continue broad coverage until results.  Pulmonology consulted, appreciate assessment, will ask IR to tap the new pleural effusion for distinguish meant between malignant effusion, cardiac/HF effusion, or infection- thoracentesis labs  ordered. Multiple hospital admissions the last few months, poor medical understanding. Palliative care consult placed for 12/19. Thoracentesis labs sent 12/19, awaiting results.     Apixaban and Plavix received early 12/18 morning, held for thoracentesis thereafter, restart after completed.       YASMEEN. Denies CP.   Eating okay. UOP wnl. BM wnl.       Review of Systems   Constitutional: Negative.    HENT: Negative.     Eyes: Negative.    Respiratory:  Positive for cough and shortness of breath.    Cardiovascular: Negative.    Gastrointestinal: Negative.    Endocrine: Negative.    Genitourinary: Negative.    Musculoskeletal: Negative.    Skin: Negative.    Allergic/Immunologic: Negative.    Neurological: Negative.    Psychiatric/Behavioral:  Positive for confusion.        Objective:     Vital Signs (Most Recent):  Temp: 97.9 °F (36.6 °C) (12/19/22 0509)  Pulse: 102 (12/19/22 0800)  Resp: (!) 21 (12/19/22 0653)  BP: (!) 147/79 (12/19/22 0800)  SpO2: 96 % (12/19/22 0800)   Vital Signs (24h Range):  Temp:  [97.7 °F (36.5 °C)-98.2 °F (36.8 °C)] 97.9 °F (36.6 °C)  Pulse:  [] 102  Resp:  [17-21] 21  SpO2:  [89 %-100 %] 96 %  BP: (103-147)/(60-79) 147/79     Weight: 87.5 kg (192 lb 14.4 oz)  Body mass index is 27.68 kg/m².    Physical Exam  Vitals and nursing note reviewed.   Constitutional:       General: He is not in acute distress.     Appearance: Normal appearance. He is not ill-appearing.   HENT:      Head: Normocephalic and atraumatic.      Nose: Nose normal.      Mouth/Throat:      Mouth: Mucous membranes are moist.   Eyes:      Extraocular Movements: Extraocular movements intact.   Cardiovascular:      Rate and Rhythm: Normal rate.      Pulses: Normal pulses.      Heart sounds: No murmur heard.  Pulmonary:      Effort: Respiratory distress present.      Comments: Diffusely rhodochrous with faint expiratory wheezes.   Abdominal:      General: Abdomen is flat.      Palpations: Abdomen is soft.      Tenderness:  There is no abdominal tenderness.   Musculoskeletal:      Right lower leg: No edema.      Left lower leg: No edema.   Skin:     General: Skin is warm.      Capillary Refill: Capillary refill takes less than 2 seconds.   Neurological:      General: No focal deficit present.      Mental Status: He is alert. He is disoriented.      Comments: AOx2   Psychiatric:         Mood and Affect: Mood normal.         Cognition and Memory: Cognition is impaired. Memory is impaired.               Recent Results (from the past 24 hour(s))   Phosphorus    Collection Time: 12/19/22  5:04 AM   Result Value Ref Range    Phosphorus 3.3 2.7 - 4.5 mg/dL   Magnesium    Collection Time: 12/19/22  5:04 AM   Result Value Ref Range    Magnesium 1.9 1.6 - 2.6 mg/dL   Comprehensive Metabolic Panel    Collection Time: 12/19/22  5:04 AM   Result Value Ref Range    Sodium 139 136 - 145 mmol/L    Potassium 4.0 3.5 - 5.1 mmol/L    Chloride 104 95 - 110 mmol/L    CO2 26 23 - 29 mmol/L    Glucose 137 (H) 70 - 110 mg/dL    BUN 18 8 - 23 mg/dL    Creatinine 0.9 0.5 - 1.4 mg/dL    Calcium 8.7 8.7 - 10.5 mg/dL    Total Protein 5.8 (L) 6.0 - 8.4 g/dL    Albumin 2.5 (L) 3.5 - 5.2 g/dL    Total Bilirubin 0.4 0.1 - 1.0 mg/dL    Alkaline Phosphatase 76 55 - 135 U/L    AST 12 10 - 40 U/L    ALT 10 10 - 44 U/L    Anion Gap 9 8 - 16 mmol/L    eGFR >60 >60 mL/min/1.73 m^2   CBC Auto Differential    Collection Time: 12/19/22  5:04 AM   Result Value Ref Range    WBC 6.94 3.90 - 12.70 K/uL    RBC 3.30 (L) 4.60 - 6.20 M/uL    Hemoglobin 10.0 (L) 14.0 - 18.0 g/dL    Hematocrit 29.3 (L) 40.0 - 54.0 %    MCV 89 82 - 98 fL    MCH 30.3 27.0 - 31.0 pg    MCHC 34.1 32.0 - 36.0 g/dL    RDW 14.8 (H) 11.5 - 14.5 %    Platelets 282 150 - 450 K/uL    MPV 9.7 9.2 - 12.9 fL    Immature Granulocytes 1.3 (H) 0.0 - 0.5 %    Gran # (ANC) 5.5 1.8 - 7.7 K/uL    Immature Grans (Abs) 0.09 (H) 0.00 - 0.04 K/uL    Lymph # 0.9 (L) 1.0 - 4.8 K/uL    Mono # 0.4 0.3 - 1.0 K/uL    Eos # 0.0 0.0 -  0.5 K/uL    Baso # 0.00 0.00 - 0.20 K/uL    nRBC 0 0 /100 WBC    Gran % 79.7 (H) 38.0 - 73.0 %    Lymph % 12.8 (L) 18.0 - 48.0 %    Mono % 6.2 4.0 - 15.0 %    Eosinophil % 0.0 0.0 - 8.0 %    Basophil % 0.0 0.0 - 1.9 %    Differential Method Automated    VANCOMYCIN, TROUGH    Collection Time: 12/19/22  5:04 AM   Result Value Ref Range    Vancomycin-Trough 19.4 10.0 - 22.0 ug/mL       Microbiology Results (last 7 days)       Procedure Component Value Units Date/Time    Aerobic culture [[id number]] Collected: 12/19/22 0759    Order Status: Sent Specimen: Pleural Fluid from Lung, Right Updated: 12/19/22 0817    Gram stain [[id number]] Collected: 12/19/22 0759    Order Status: Sent Specimen: Pleural Fluid from Lung, Right Updated: 12/19/22 0816    Culture, Anaerobic [[id number]] Collected: 12/19/22 0759    Order Status: Sent Specimen: Pleural Fluid from Lung, Right Updated: 12/19/22 0815    Blood culture #1 **CANNOT BE ORDERED STAT** [[id number]] Collected: 12/16/22 0224    Order Status: Completed Specimen: Blood Updated: 12/19/22 0303     Blood Culture, Routine No Growth to date      No Growth to date      No Growth to date      No Growth to date    Blood culture #2 **CANNOT BE ORDERED STAT** [[id number]] Collected: 12/16/22 0224    Order Status: Completed Specimen: Blood Updated: 12/19/22 0303     Blood Culture, Routine No Growth to date      No Growth to date      No Growth to date      No Growth to date    Culture, Respiratory with Gram Stain [[id number]] Collected: 12/16/22 0706    Order Status: Completed Specimen: Sputum Updated: 12/18/22 0548     Respiratory Culture Normal respiratory maksim     Gram Stain (Respiratory) <10 epithelial cells per low power field.     Gram Stain (Respiratory) Few WBC's     Gram Stain (Respiratory) Moderate Gram positive cocci in pairs and clusters             Imaging Results               CTA Chest Non-Coronary (PE Studies) (Final result)  Result time 12/16/22 03:55:50      Final result by  Jordin Sofia MD (12/16/22 03:55:50)                   Impression:      There is no large central saddle type pulmonary embolus, when accounting for limitations of the examination there is no additional evidence for pulmonary embolus on this exam.    Persistent abnormal appearance of the left upper lobe with suspected occlusion of the left upper lobe bronchus, and volume loss of the left upper lobe however there is progression of abnormality, with increased pattern of interstitial and alveolar opacity, which may relate to superimposed interstitial and alveolar infiltrates/airspace disease and consolidative change and volume loss.    The lungs bilaterally demonstrate appearance of increased interstitial and ground-glass opacity with additional areas of confluent infiltrates/airspace disease having developed at the left lower lobe inferiorly.    Previously identified opacity that may relate to chronic and consolidative change of the left upper lobe posterior medially appears similar to the prior study.    Mild opacity within the trachea layering posteriorly superior to the jonnathan may relate to secretions or aspiration.  Bilateral peribronchial thickening is also noted.    Moderate to large right pleural effusion, small left pleural effusion.  Small pericardial effusion.    Left supraclavicular lesion again noted, not well evaluated on this examination as discussed above, similarly the previously identified left upper lobe nodule is obscured due to the findings of the left upper lobe at this time.    Additional findings as above.    This report was flagged in Epic as abnormal.      Electronically signed by: Jordin Sofia  Date:    12/16/2022  Time:    03:55               Narrative:    EXAMINATION:  CTA CHEST NON CORONARY (PE STUDIES)    CLINICAL HISTORY:  Pulmonary embolism (PE) suspected, positive D-dimer;    TECHNIQUE:  Low dose axial images, sagittal and coronal reformations were obtained from the thoracic  inlet to the lung bases following the IV administration of 75 mL of Omnipaque 350.  Contrast timing was optimized to evaluate the pulmonary arteries.  MIP images were performed.    COMPARISON:  Prior examinations, most recent of which is CT examination of the chest November 18, 2022    FINDINGS:  There is no large central saddle type pulmonary embolus.  When accounting for artifact the pulmonary arterial vascular demonstrate opacification without evidence for abnormal pattern of pulmonary arterial filling defects specific for pulmonary emboli.    There is a right-sided central venous catheter noted, the distal tip extends to the SVC.  Better demonstrated on the prior examination there is a supraclavicular/retroclavicular soft tissue lesion, consistent with a soft tissue mass or enlarged adenopathy, this is difficult to accurately measure on this exam, as the examination was optimized for evaluation of the pulmonary arterial vasculature rather than soft tissues.    There is a moderate to large pleural effusion on the right, and a small pleural effusion on the left, this is seen as an interval change.  There is also a small pericardial effusion.  The thoracic aorta demonstrates appropriate opacification.  Atherosclerotic change noted.    The lungs demonstrate chronic change with emphysematous change noted.  The right hemithorax demonstrates appearance of interstitial infiltrate/edema and ground-glass infiltrate throughout the right hemithorax, there is atelectasis noted, most notably at the right lower lobe, associated with the aforementioned pleural effusion.    As previously noted there is appearance thought to represent occlusion of the left upper lobe bronchus.  There is volume loss of the left upper lobe, there is increased pattern of interstitial as well as alveolar opacity, prominent confluent opacity may relate to components of consolidation, and volume loss.  The previously identified nodule at the left upper  lobe medially is obscured by the aforementioned.  The left lower lobe demonstrates interstitial edema/infiltrate, as well as a diffuse pattern of ground-glass infiltrate.  There is an area of superimposed opacity consistent with consolidative change posteriorly medially appearing similar to the prior examination.  There is additional appearance of confluent infiltrates/airspace disease at the left lung base.    There is mild opacity layering posteriorly within the trachea just above the level of the jonnathan this may relate to secretions or aspiration.  There is peribronchial thickening bilaterally most notably at the lung bases.  There is no evidence for pneumothorax.    Limited imaging of the upper abdomen demonstrates no evidence for acute upper abdominal process.  The visualized osseous structures demonstrate chronic change.                                       X-Ray Chest 1 View (Final result)  Result time 12/16/22 01:02:44      Final result by Luis Enrique Schroeder MD (12/16/22 01:02:44)                   Impression:      Stable pleural and parenchymal opacities and Port-A-Cath with no acute findings radiographically.      Electronically signed by: Luis Enrique Schroeder  Date:    12/16/2022  Time:    01:02               Narrative:    EXAMINATION:  XR CHEST 1 VIEW    CLINICAL HISTORY:  shortness of breath;    TECHNIQUE:  Single frontal view of the chest was performed.    COMPARISON:  11/03/2002    FINDINGS:  Pleural and parenchymal opacities do not appear significantly changed.  Port-A-Cath remains in position.  Bones remain intact.  The heart mediastinal contours are stable.                                            Assessment/Plan:      * Acute on chronic respiratory failure with hypoxia  Patient with Hypoxic Respiratory failure which is Acute on chronic.  he is on home oxygen at 2 LPM. Supplemental oxygen was provided and noted- Oxygen Concentration (%):  [100] 100.   Signs/symptoms of respiratory failure include-  increased work of breathing and respiratory distress. Contributing diagnoses includes - COPD and Pneumonia Labs and images were reviewed. Patient Has recent ABG, which has been reviewed. Will treat underlying causes and adjust management of respiratory failure as follows- see plans for HAP & COPD exacerbation       Pleural effusion  Pulmonology consulted, appreciate assessment:    Present on chest CTA from 12/17 but not present on most recent CT from a month before.  History of diastolic CHF, as well as lung cancer with metastases to right-sided ribs.   Given the timeframe of CT changes, I suspect this is likely due to CHF (probably diastolic) rather than infection or cancer.  However, given known cancer and immunosuppression from chemotherapy, I think that right thoracentesis is appropriate when anticoagulation can be safely held.    · Cardiac/HF effusion vs Infx/parapneumonic effusion vs malignant effusion  · Will ask IR to tap the new pleural effusion for distinguish   · Thoracentesis labs ordered.   · Multiple hospital admissions the last few months, poor medical understanding- Palliative care consult placed for 12/19.  · Thoracentesis labs sent 12/19, awaiting results.   · WBC# has normalized for first time 12/19/22    Persistent atrial fibrillation  [id number] Score: 1.  Resume home medications   AC held for thora, restart 12/20    Chronic obstructive pulmonary disease with acute exacerbation  - History of COPD on supplemental O2, 2L NC at home   - Presented with worsening SOB   - Likely related to a COPD exacerbation in the setting of pneumonia      Plan:   - Continue steroids.  - Duo-Nebs scheduled  - Wean O2 as tolerated    Coronary artery disease involving native coronary artery of native heart without angina pectoris  Denies chest pain  Resume aspirin, plavix, and statin      PAD (peripheral artery disease)  Continue home medications       Chronic diastolic heart failure  -Echocardiogram (2/22/2022): EF:  35%-40%, GIDD, normal RV systolic function, mild TR   -Echocardiogram (5/18/2022): EF: 65%, indeterminate LV diastolic function, mild TR, PA pressure of 54 mmHg  -Echocardiogram (9/30/2022): EF: 60%, mildly reduced RV systolic function, PA pressure of 40 mmHg.   -Elevated BNP, around baseline   -Doubt exacerbation   -Resume home Lasix       Essential hypertension  Resume home medications       Lung cancer, main bronchus, left  On maintenance chemotherapy and radiation   Outpatient follow up with palliative care/oncology       HAP (hospital-acquired pneumonia)  Presented with SOB, cough   CTA showed no PE, presistent and worsening occlusion of left upper lobe bronchus with superimposed interstitial and alveolar infiltrates., increased ground glass opacities, moderate to large right pleural effusion, small left pleural effusion and other chronic stable change.   Concern for HAP/post obstructive pneumonia    Plan:   - Vancomycin, zosyn and doxycyline   - Respiratory cultures   - Wean O2 as tolerated     VTE Risk Mitigation (From admission, onward)         Ordered     apixaban tablet 5 mg  2 times daily         12/19/22 0832     IP VTE HIGH RISK PATIENT  Once         12/16/22 0435     Place sequential compression device  Until discontinued         12/16/22 0435                Discharge Planning   AZ: 12/19/2022     Code Status: Full Code   Is the patient medically ready for discharge?:     Reason for patient still in hospital (select all that apply): Patient trending condition and Treatment  Discharge Plan A: Home with family                  Neel Dupree MD  Department of Hospital Medicine   [facility] - Telemetry

## 2022-12-19 NOTE — NURSING
Patient returned to [facility] from [facility] [place]. Bandaid to lateral back present with small dot of blood on it.  Patient awake, alert, oriented without c/o discomfort.  Wife fay at bedside. Patient eating breakfast. 12 L O2 nc high flow in use.  Tele monitoring in progress. No apparent distress noted at this time.

## 2022-12-19 NOTE — CONSULTS
[facility] - Telemetry  Palliative Medicine  Consult Note    Patient Name: Kashif Iverson  MRN: [id number]  Admission Date: 12/16/2022  Hospital Length of Stay: 3 days  Code Status: Full Code   Attending Provider: Neel Dupree MD  Consulting Provider: Eli Arango NP  Primary Care Physician: Eduardo Ruiz MD  Principal Problem:Acute on chronic respiratory failure with hypoxia    Patient information was obtained from patient, past medical records, ER records and primary team.      Inpatient consult to Palliative Care  Consult performed by: Eli Arango NP  Consult ordered by: Neel Dupree MD  Reason for consult: [facility]        Assessment/Plan:   Palliative encounter:  Advance Care Planning      -Palliative consult received  -Patient known to palliative see ACP notes   -Chart reviewed in detail  -At time of consult patient up in bed with vapotherm on. He is alert and oriented but having trouble remembering why he came in until  I reminded him. He states   he feels better today.   -we discussed his current clinical condition, co morbids, hospitalizations, infections, and QOL   He states he feels like he has tolerated the chemo tx fair and would like to continue it as long as he tolerates it. He does seem to understand it is not curative but palliative. Noted last oncology office appt. Scans are stable  -I let him know that at some point the doctor may tell him he is no longer a candidate for tx and or he may decide he does not want any more tx, and at this point he would benefit from hospice. He could potentially meet criteria for COPD alone outside of lung cancer dx if determined it is not related.  This would be determined by the hospice medical director of the agency  -He was open to the hospice discussion today and we discussed in detail. This is a change as he has been very reluctant to discuss in past. He had several questions about hospice which I answered. He is not agreeable to hospice at this  "time.  -updated Dr Dupree       Acute on chronic respiratory failure with hypoxia  -COPD exacerbation/PNA,  Lung ca all contributing factors  -mgmt per primary        Pleural effusion  -Pulmonology consulted  "Present on chest CTA from 12/17 but not present on most recent CT from a month before.  History of diastolic CHF, as well as lung cancer with metastases to right-sided ribs.   Given the timeframe of CT changes, I suspect this is likely due to CHF (probably diastolic) rather than infection or cancer.  However, given known cancer and immunosuppression from chemotherapy, I think that right thoracentesis is appropriate when anticoagulation can be safely held."        Persistent atrial fibrillation     Chronic obstructive pulmonary disease with acute exacerbation  - History of COPD on supplemental O2, 2L NC at home   - Presented with worsening SOB   - Likely related to a COPD exacerbation in the setting of pneumonia    -mgmt per primary     Coronary artery disease involving native coronary artery of native heart without angina pectoris       Chronic diastolic heart failure  -"Echocardiogram (9/30/2022): EF: 60%, mildly reduced RV systolic function, PA pressure of 40 mmHg."       Lung cancer, main bronchus, left  -On maintenance chemotherapy and radiation   -Outpatient follow up with palliative care/oncology         HAP (hospital-acquired pneumonia)  "CTA showed no PE, presistent and worsening occlusion of left upper lobe bronchus with superimposed interstitial and alveolar infiltrates., increased ground glass opacities, moderate to large right pleural effusion, small left pleural effusion and other chronic stable change. "  -abx given  -Mgmt per primary    Essential hypertension    Peripheral Artery disease (PAD)     Thank you for your consult. I will follow-up with patient. Please contact us if you have any additional questions.    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      Patient reports developing worsening shortness of breath and productive cough since he took his last chemotherapy dose on 12/13. He is on 2L O2 at home, and SpO2 readings were as low as 80%, and would not improved despite increasing the O2 to 4L. He has multiple admissions for similar presentation. No longer a smoker. In the ED, the patient was tachycardic, tachypnic and hypoxic requiring 12L O2. Labs showed leukocytosis (16.9), elevated d-dimer (1.94), hypokalemia (3.4), elevated BNP (355), elevated troponin (0.089), elevated lactic acid (2.3). CTA showed no PE, presistent and worsening occlusion of left upper lobe bronchus with superimposed interstitial and alveolar infiltrates., increased ground glass opacities, moderate to large right pleural effusion, small left pleural effusion and other chronic stable changes.  . He was given fluids, vancomycin, zosyn, and DuoNeb x1. The patient was admitted for further management.         Overview/Hospital Course:  Lung Cancer with superimposed Pneumonia. Staph-like organisms growing in sputum, continue broad coverage until results.  Pulmonology consulted, appreciate assessment, will ask IR to tap the new pleural effusion for distinguish meant between malignant effusion, cardiac/HF effusion, or infection- thoracentesis labs ordered. Multiple hospital admissions the last few months, poor medical understanding. Palliative care consult placed for 12/19. Thoracentesis labs sent 12/19, awaiting results.       Hospital Course:  No notes on file        Past Medical History:   Diagnosis Date    Chronic obstructive pulmonary disease with acute exacerbation 9/5/2022    Combined systolic and diastolic heart failure     Dependence on  supplemental oxygen 5/24/2022    History of completed stroke 9/3/2019     09/03/2019 On CT exam    History of non-ST elevation myocardial infarction (NSTEMI) 2/22/2022    Hypertension     Lung cancer     NSCLC    Lung mass     left lung    Macrocytic anemia 8/24/2019    PAD (peripheral artery disease) 3/14/2022    LUIS FELIPE 3/11/22    Peripheral artery disease        Past Surgical History:   Procedure Laterality Date    BRONCHOSCOPY N/A 08/30/2019    Procedure: Bronchoscopy;  Surgeon: [facility];  Location: [facility] OR [address] [place] [address];  Service: Anesthesiology;  Laterality: N/A;    CORONARY ANGIOGRAPHY N/A 03/04/2022    Procedure: ANGIOGRAM, CORONARY ARTERY;  Surgeon: Robson Green MD;  Location: [facility] [place] CATH LAB;  Service: Cardiology;  Laterality: N/A;    INSERTION OF TUNNELED CENTRAL VENOUS CATHETER (CVC) WITH SUBCUTANEOUS PORT         Review of patient's allergies indicates:  No Known Allergies    Medications:  Continuous Infusions:  Scheduled Meds:   albuterol-ipratropium  3 mL Nebulization Q4H    amLODIPine  10 mg Oral Daily    [START ON 12/20/2022] apixaban  5 mg Oral BID    atorvastatin  80 mg Oral Daily    cefTRIAXone (ROCEPHIN) IVPB  2 g Intravenous Q24H    cilostazoL  100 mg Oral BID    [START ON 12/20/2022] clopidogreL  75 mg Oral Daily    doxycycline  100 mg Oral Q12H    furosemide  40 mg Oral BID    guaiFENesin  1,200 mg Oral BID    metoprolol tartrate  50 mg Oral QID    polyethylene glycol  17 g Oral Daily    vancomycin (VANCOCIN) IVPB  1,000 mg Intravenous Q12H     PRN Meds:acetaminophen, acetaminophen, albuterol-ipratropium, aluminum-magnesium hydroxide-simethicone, benzonatate, bisacodyL, dextrose 10%, dextrose 10%, glucagon (human recombinant), glucose, glucose, HYDROcodone-acetaminophen, magnesium oxide, magnesium oxide, melatonin, naloxone, ondansetron, potassium bicarbonate, potassium bicarbonate, potassium bicarbonate, potassium, sodium phosphates, potassium, sodium  phosphates, potassium, sodium phosphates, prochlorperazine, simethicone, sodium chloride 0.9%, Pharmacy to dose Vancomycin consult **AND** vancomycin - pharmacy to dose    Family History       Problem Relation (Age of Onset)    Cancer Father, Sister    Diabetes Mother    Heart defect Mother    No Known Problems Son          Tobacco Use    Smoking status: Former     Packs/day: 1.00     Years: 25.00     Pack years: 25.00     Types: Cigarettes     Quit date:      Years since quittin.9    Smokeless tobacco: Never   Substance and Sexual Activity    Alcohol use: Yes     Comment: 11/3/22: Occ.    Drug use: Never    Sexual activity: Yes     Partners: Female       Review of Systems   Constitutional:  Positive for activity change and appetite change.   Respiratory:  Positive for cough and shortness of breath.    Gastrointestinal: Negative.    Musculoskeletal:  Positive for myalgias.   Neurological:  Positive for weakness.   Objective:     Vital Signs (Most Recent):  Temp: 97.6 °F (36.4 °C) (22 1110)  Pulse: 97 (22 1112)  Resp: 20 (22 1112)  BP: 102/63 (22 1110)  SpO2: 98 % (22 1112) Vital Signs (24h Range):  Temp:  [97.6 °F (36.4 °C)-98.4 °F (36.9 °C)] 97.6 °F (36.4 °C)  Pulse:  [] 97  Resp:  [17-21] 20  SpO2:  [92 %-100 %] 98 %  BP: (102-147)/(60-79) 102/63     Weight: 87.5 kg (192 lb 14.4 oz)  Body mass index is 27.68 kg/m².    Physical Exam  Vitals and nursing note reviewed.   Constitutional:       General: He is not in acute distress.     Appearance: He is ill-appearing. He is not toxic-appearing or diaphoretic.   HENT:      Head: Normocephalic.      Right Ear: External ear normal.      Left Ear: External ear normal.      Nose: Nose normal.   Eyes:      General:         Right eye: No discharge.         Left eye: No discharge.   Cardiovascular:      Rate and Rhythm: Tachycardia present.   Pulmonary:      Breath sounds: Wheezing (expiratory) present.      Comments: Increased  work of breathing with vapotherm  Abdominal:      Palpations: Abdomen is soft.   Musculoskeletal:         General: Swelling (left elbow and forearm) present.   Skin:     General: Skin is warm and dry.   Neurological:      Mental Status: He is alert.      Motor: Weakness present.      Comments: Oriented x 2, having some confusion regarding timeline    Psychiatric:         Mood and Affect: Mood normal.         Behavior: Behavior normal.       Review of Symptoms      Symptom Assessment (ESAS 0-10 Scale)  Pain:  0  Dyspnea:  0  Anxiety:  0  Nausea:  0  Depression:  0  Anorexia:  0  Fatigue:  0  Insomnia:  0  Restlessness:  0  Agitation:  0           Advance Care Planning   Advance Directives:   Living Will: No    Do Not Resuscitate Status: No      Decision Making:  Patient answered questions       Significant Labs: All pertinent labs within the past 24 hours have been reviewed.  CBC:   Recent Labs   Lab 12/19/22  0504   WBC 6.94   HGB 10.0*   HCT 29.3*   MCV 89        BMP:  Recent Labs   Lab 12/19/22  0504   *      K 4.0      CO2 26   BUN 18   CREATININE 0.9   CALCIUM 8.7   MG 1.9     LFT:  Lab Results   Component Value Date    AST 12 12/19/2022    ALKPHOS 76 12/19/2022    BILITOT 0.4 12/19/2022     Albumin:   Albumin   Date Value Ref Range Status   12/19/2022 2.5 (L) 3.5 - 5.2 g/dL Final     Protein:   Total Protein   Date Value Ref Range Status   12/19/2022 5.8 (L) 6.0 - 8.4 g/dL Final     Lactic acid:   Lab Results   Component Value Date    LACTATE 1.9 12/16/2022    LACTATE 2.3 (H) 12/16/2022       Significant Imaging: I have reviewed all pertinent imaging results/findings within the past 24 hours.  CXR     16 min ACP time spent in goals of care, emotional support, formulating and communicating prognosis, exploring burden/benefit of various approaches of treatment.      > 50% of 70 mins spent in chart review, face to face discussion, symptom assessment, coordination of care with other  specialists and d/c planning.    Total time  86 mins      Eli Arango NP  Palliative Medicine  [facility] - Telemetry

## 2022-12-19 NOTE — PLAN OF CARE
Problem: Adult Inpatient Plan of Care  Goal: Plan of Care Review  Outcome: Ongoing, Progressing  Goal: Patient-Specific Goal (Individualized)  Outcome: Ongoing, Progressing  Goal: Absence of Hospital-Acquired Illness or Injury  Outcome: Ongoing, Progressing  Goal: Optimal Comfort and Wellbeing  Outcome: Ongoing, Progressing  Goal: Readiness for Transition of Care  Outcome: Ongoing, Progressing     Problem: Infection (Pneumonia)  Goal: Resolution of Infection Signs and Symptoms  Outcome: Ongoing, Progressing  Intervention: Prevent Infection Progression  Flowsheets (Taken 12/19/2022 7697)  Fever Reduction/Comfort Measures: lightweight bedding  Infection Management: aseptic technique maintained  Isolation Precautions:   protective   precautions maintained

## 2022-12-19 NOTE — ASSESSMENT & PLAN NOTE
Pulmonology consulted, appreciate assessment:    Present on chest CTA from 12/17 but not present on most recent CT from a month before.  History of diastolic CHF, as well as lung cancer with metastases to right-sided ribs.   Given the timeframe of CT changes, I suspect this is likely due to CHF (probably diastolic) rather than infection or cancer.  However, given known cancer and immunosuppression from chemotherapy, I think that right thoracentesis is appropriate when anticoagulation can be safely held.    · Cardiac/HF effusion vs Infx/parapneumonic effusion vs malignant effusion  · Will ask IR to tap the new pleural effusion for distinguish   · Thoracentesis labs ordered.   · Multiple hospital admissions the last few months, poor medical understanding- Palliative care consult placed for 12/19.  · Thoracentesis labs sent 12/19, awaiting results.   · WBC# has normalized for first time 12/19/22

## 2022-12-19 NOTE — SUBJECTIVE & OBJECTIVE
YASMEEN. Denies CP.   Eating okay. UOP wnl. BM wnl.       Review of Systems   Constitutional: Negative.    HENT: Negative.     Eyes: Negative.    Respiratory:  Positive for cough and shortness of breath.    Cardiovascular: Negative.    Gastrointestinal: Negative.    Endocrine: Negative.    Genitourinary: Negative.    Musculoskeletal: Negative.    Skin: Negative.    Allergic/Immunologic: Negative.    Neurological: Negative.    Psychiatric/Behavioral:  Positive for confusion.        Objective:     Vital Signs (Most Recent):  Temp: 97.9 °F (36.6 °C) (12/19/22 0509)  Pulse: 102 (12/19/22 0800)  Resp: (!) 21 (12/19/22 0653)  BP: (!) 147/79 (12/19/22 0800)  SpO2: 96 % (12/19/22 0800)   Vital Signs (24h Range):  Temp:  [97.7 °F (36.5 °C)-98.2 °F (36.8 °C)] 97.9 °F (36.6 °C)  Pulse:  [] 102  Resp:  [17-21] 21  SpO2:  [89 %-100 %] 96 %  BP: (103-147)/(60-79) 147/79     Weight: 87.5 kg (192 lb 14.4 oz)  Body mass index is 27.68 kg/m².    Physical Exam  Vitals and nursing note reviewed.   Constitutional:       General: He is not in acute distress.     Appearance: Normal appearance. He is not ill-appearing.   HENT:      Head: Normocephalic and atraumatic.      Nose: Nose normal.      Mouth/Throat:      Mouth: Mucous membranes are moist.   Eyes:      Extraocular Movements: Extraocular movements intact.   Cardiovascular:      Rate and Rhythm: Normal rate.      Pulses: Normal pulses.      Heart sounds: No murmur heard.  Pulmonary:      Effort: Respiratory distress present.      Comments: Diffusely rhodochrous with faint expiratory wheezes.   Abdominal:      General: Abdomen is flat.      Palpations: Abdomen is soft.      Tenderness: There is no abdominal tenderness.   Musculoskeletal:      Right lower leg: No edema.      Left lower leg: No edema.   Skin:     General: Skin is warm.      Capillary Refill: Capillary refill takes less than 2 seconds.   Neurological:      General: No focal deficit present.      Mental Status: He is  alert. He is disoriented.      Comments: AOx2   Psychiatric:         Mood and Affect: Mood normal.         Cognition and Memory: Cognition is impaired. Memory is impaired.               Recent Results (from the past 24 hour(s))   Phosphorus    Collection Time: 12/19/22  5:04 AM   Result Value Ref Range    Phosphorus 3.3 2.7 - 4.5 mg/dL   Magnesium    Collection Time: 12/19/22  5:04 AM   Result Value Ref Range    Magnesium 1.9 1.6 - 2.6 mg/dL   Comprehensive Metabolic Panel    Collection Time: 12/19/22  5:04 AM   Result Value Ref Range    Sodium 139 136 - 145 mmol/L    Potassium 4.0 3.5 - 5.1 mmol/L    Chloride 104 95 - 110 mmol/L    CO2 26 23 - 29 mmol/L    Glucose 137 (H) 70 - 110 mg/dL    BUN 18 8 - 23 mg/dL    Creatinine 0.9 0.5 - 1.4 mg/dL    Calcium 8.7 8.7 - 10.5 mg/dL    Total Protein 5.8 (L) 6.0 - 8.4 g/dL    Albumin 2.5 (L) 3.5 - 5.2 g/dL    Total Bilirubin 0.4 0.1 - 1.0 mg/dL    Alkaline Phosphatase 76 55 - 135 U/L    AST 12 10 - 40 U/L    ALT 10 10 - 44 U/L    Anion Gap 9 8 - 16 mmol/L    eGFR >60 >60 mL/min/1.73 m^2   CBC Auto Differential    Collection Time: 12/19/22  5:04 AM   Result Value Ref Range    WBC 6.94 3.90 - 12.70 K/uL    RBC 3.30 (L) 4.60 - 6.20 M/uL    Hemoglobin 10.0 (L) 14.0 - 18.0 g/dL    Hematocrit 29.3 (L) 40.0 - 54.0 %    MCV 89 82 - 98 fL    MCH 30.3 27.0 - 31.0 pg    MCHC 34.1 32.0 - 36.0 g/dL    RDW 14.8 (H) 11.5 - 14.5 %    Platelets 282 150 - 450 K/uL    MPV 9.7 9.2 - 12.9 fL    Immature Granulocytes 1.3 (H) 0.0 - 0.5 %    Gran # (ANC) 5.5 1.8 - 7.7 K/uL    Immature Grans (Abs) 0.09 (H) 0.00 - 0.04 K/uL    Lymph # 0.9 (L) 1.0 - 4.8 K/uL    Mono # 0.4 0.3 - 1.0 K/uL    Eos # 0.0 0.0 - 0.5 K/uL    Baso # 0.00 0.00 - 0.20 K/uL    nRBC 0 0 /100 WBC    Gran % 79.7 (H) 38.0 - 73.0 %    Lymph % 12.8 (L) 18.0 - 48.0 %    Mono % 6.2 4.0 - 15.0 %    Eosinophil % 0.0 0.0 - 8.0 %    Basophil % 0.0 0.0 - 1.9 %    Differential Method Automated    VANCOMYCIN, TROUGH    Collection Time: 12/19/22   5:04 AM   Result Value Ref Range    Vancomycin-Trough 19.4 10.0 - 22.0 ug/mL       Microbiology Results (last 7 days)       Procedure Component Value Units Date/Time    Aerobic culture [[id number]] Collected: 12/19/22 0759    Order Status: Sent Specimen: Pleural Fluid from Lung, Right Updated: 12/19/22 0817    Gram stain [[id number]] Collected: 12/19/22 0759    Order Status: Sent Specimen: Pleural Fluid from Lung, Right Updated: 12/19/22 0816    Culture, Anaerobic [[id number]] Collected: 12/19/22 0759    Order Status: Sent Specimen: Pleural Fluid from Lung, Right Updated: 12/19/22 0815    Blood culture #1 **CANNOT BE ORDERED STAT** [[id number]] Collected: 12/16/22 0224    Order Status: Completed Specimen: Blood Updated: 12/19/22 0303     Blood Culture, Routine No Growth to date      No Growth to date      No Growth to date      No Growth to date    Blood culture #2 **CANNOT BE ORDERED STAT** [[id number]] Collected: 12/16/22 0224    Order Status: Completed Specimen: Blood Updated: 12/19/22 0303     Blood Culture, Routine No Growth to date      No Growth to date      No Growth to date      No Growth to date    Culture, Respiratory with Gram Stain [[id number]] Collected: 12/16/22 0706    Order Status: Completed Specimen: Sputum Updated: 12/18/22 0548     Respiratory Culture Normal respiratory maksim     Gram Stain (Respiratory) <10 epithelial cells per low power field.     Gram Stain (Respiratory) Few WBC's     Gram Stain (Respiratory) Moderate Gram positive cocci in pairs and clusters             Imaging Results               CTA Chest Non-Coronary (PE Studies) (Final result)  Result time 12/16/22 03:55:50      Final result by Jordin Sofia MD (12/16/22 03:55:50)                   Impression:      There is no large central saddle type pulmonary embolus, when accounting for limitations of the examination there is no additional evidence for pulmonary embolus on this exam.    Persistent abnormal appearance of the left  upper lobe with suspected occlusion of the left upper lobe bronchus, and volume loss of the left upper lobe however there is progression of abnormality, with increased pattern of interstitial and alveolar opacity, which may relate to superimposed interstitial and alveolar infiltrates/airspace disease and consolidative change and volume loss.    The lungs bilaterally demonstrate appearance of increased interstitial and ground-glass opacity with additional areas of confluent infiltrates/airspace disease having developed at the left lower lobe inferiorly.    Previously identified opacity that may relate to chronic and consolidative change of the left upper lobe posterior medially appears similar to the prior study.    Mild opacity within the trachea layering posteriorly superior to the jonnathan may relate to secretions or aspiration.  Bilateral peribronchial thickening is also noted.    Moderate to large right pleural effusion, small left pleural effusion.  Small pericardial effusion.    Left supraclavicular lesion again noted, not well evaluated on this examination as discussed above, similarly the previously identified left upper lobe nodule is obscured due to the findings of the left upper lobe at this time.    Additional findings as above.    This report was flagged in Epic as abnormal.      Electronically signed by: Jodrin Sofia  Date:    12/16/2022  Time:    03:55               Narrative:    EXAMINATION:  CTA CHEST NON CORONARY (PE STUDIES)    CLINICAL HISTORY:  Pulmonary embolism (PE) suspected, positive D-dimer;    TECHNIQUE:  Low dose axial images, sagittal and coronal reformations were obtained from the thoracic inlet to the lung bases following the IV administration of 75 mL of Omnipaque 350.  Contrast timing was optimized to evaluate the pulmonary arteries.  MIP images were performed.    COMPARISON:  Prior examinations, most recent of which is CT examination of the chest November 18,  2022    FINDINGS:  There is no large central saddle type pulmonary embolus.  When accounting for artifact the pulmonary arterial vascular demonstrate opacification without evidence for abnormal pattern of pulmonary arterial filling defects specific for pulmonary emboli.    There is a right-sided central venous catheter noted, the distal tip extends to the SVC.  Better demonstrated on the prior examination there is a supraclavicular/retroclavicular soft tissue lesion, consistent with a soft tissue mass or enlarged adenopathy, this is difficult to accurately measure on this exam, as the examination was optimized for evaluation of the pulmonary arterial vasculature rather than soft tissues.    There is a moderate to large pleural effusion on the right, and a small pleural effusion on the left, this is seen as an interval change.  There is also a small pericardial effusion.  The thoracic aorta demonstrates appropriate opacification.  Atherosclerotic change noted.    The lungs demonstrate chronic change with emphysematous change noted.  The right hemithorax demonstrates appearance of interstitial infiltrate/edema and ground-glass infiltrate throughout the right hemithorax, there is atelectasis noted, most notably at the right lower lobe, associated with the aforementioned pleural effusion.    As previously noted there is appearance thought to represent occlusion of the left upper lobe bronchus.  There is volume loss of the left upper lobe, there is increased pattern of interstitial as well as alveolar opacity, prominent confluent opacity may relate to components of consolidation, and volume loss.  The previously identified nodule at the left upper lobe medially is obscured by the aforementioned.  The left lower lobe demonstrates interstitial edema/infiltrate, as well as a diffuse pattern of ground-glass infiltrate.  There is an area of superimposed opacity consistent with consolidative change posteriorly medially  appearing similar to the prior examination.  There is additional appearance of confluent infiltrates/airspace disease at the left lung base.    There is mild opacity layering posteriorly within the trachea just above the level of the jonnathan this may relate to secretions or aspiration.  There is peribronchial thickening bilaterally most notably at the lung bases.  There is no evidence for pneumothorax.    Limited imaging of the upper abdomen demonstrates no evidence for acute upper abdominal process.  The visualized osseous structures demonstrate chronic change.                                       X-Ray Chest 1 View (Final result)  Result time 12/16/22 01:02:44      Final result by Luis Enrique Schroeder MD (12/16/22 01:02:44)                   Impression:      Stable pleural and parenchymal opacities and Port-A-Cath with no acute findings radiographically.      Electronically signed by: Luis Enrique Schroeder  Date:    12/16/2022  Time:    01:02               Narrative:    EXAMINATION:  XR CHEST 1 VIEW    CLINICAL HISTORY:  shortness of breath;    TECHNIQUE:  Single frontal view of the chest was performed.    COMPARISON:  11/03/2002    FINDINGS:  Pleural and parenchymal opacities do not appear significantly changed.  Port-A-Cath remains in position.  Bones remain intact.  The heart mediastinal contours are stable.

## 2022-12-19 NOTE — HPI
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      Patient reports developing worsening shortness of breath and productive cough since he took his last chemotherapy dose on 12/13. He is on 2L O2 at home, and SpO2 readings were as low as 80%, and would not improved despite increasing the O2 to 4L. He has multiple admissions for similar presentation. No longer a smoker. In the ED, the patient was tachycardic, tachypnic and hypoxic requiring 12L O2. Labs showed leukocytosis (16.9), elevated d-dimer (1.94), hypokalemia (3.4), elevated BNP (355), elevated troponin (0.089), elevated lactic acid (2.3). CTA showed no PE, presistent and worsening occlusion of left upper lobe bronchus with superimposed interstitial and alveolar infiltrates., increased ground glass opacities, moderate to large right pleural effusion, small left pleural effusion and other chronic stable changes.  . He was given fluids, vancomycin, zosyn, and DuoNeb x1. The patient was admitted for further management.         Overview/Hospital Course:  Lung Cancer with superimposed Pneumonia. Staph-like organisms growing in sputum, continue broad coverage until results.  Pulmonology consulted, appreciate assessment, will ask IR to tap the new pleural effusion for distinguish meant between malignant effusion, cardiac/HF effusion, or infection- thoracentesis labs ordered. Multiple hospital admissions the last few months, poor medical understanding. Palliative care consult placed for 12/19. Thoracentesis labs sent 12/19, awaiting results.

## 2022-12-19 NOTE — NURSING
Patient to US for arm via bed as ordered. Patient awake, alert, oriented without c/o discomfort at this time. No apparent distress noted. Will attempt to wean O2 when returns to floor

## 2022-12-19 NOTE — PROGRESS NOTES
Pharmacokinetic Assessment Follow Up: IV Vancomycin    Vancomycin serum concentration assessment(s):    The trough level was drawn correctly and can be used to guide therapy at this time. The measurement is within the desired definitive target range of 10 to 20 mcg/mL.    Vancomycin Regimen Plan:    Continue regimen to Vancomycin 1000 mg IV every 12 hours with next serum trough concentration measured at 0500 prior to 3rd dose on 12/20/2022    Drug levels (last 3 results):  Recent Labs   Lab Result Units 12/17/22  1754 12/19/22  0504   Vancomycin-Trough ug/mL 20.6 19.4       Pharmacy will continue to follow and monitor vancomycin.    Please contact pharmacy at extension [phone] for questions regarding this assessment.    Thank you for the consult,   Kingston Walker Jr       Patient brief summary:  Kashif Iverson is a 62 y.o. male initiated on antimicrobial therapy with IV Vancomycin for treatment of  pneumonia    Drug Allergies:   Review of patient's allergies indicates:  No Known Allergies    Actual Body Weight:   87.5 kg    Renal Function:   Estimated Creatinine Clearance: 87.9 mL/min (based on SCr of 0.9 mg/dL).,     Dialysis Method (if applicable):  N/A    CBC (last 72 hours):  Recent Labs   Lab Result Units 12/17/22  0825 12/18/22  0511 12/19/22  0504   WBC K/uL 18.95* 14.98* 6.94   Hemoglobin g/dL 11.4* 10.9* 10.0*   Hematocrit % 33.3* 31.0* 29.3*   Platelets K/uL 358 337 282   Gran % % 95.3* 92.6* 79.7*   Lymph % % 2.8* 5.0* 12.8*   Mono % % 0.9* 1.3* 6.2   Eosinophil % % 0.2 0.1 0.0   Basophil % % 0.1 0.2 0.0   Differential Method  Automated Automated Automated       Metabolic Panel (last 72 hours):  Recent Labs   Lab Result Units 12/17/22  0825 12/18/22  0511 12/19/22  0504   Sodium mmol/L 136 136 139   Potassium mmol/L 2.9* 3.1* 4.0   Chloride mmol/L 99 101 104   CO2 mmol/L 26 25 26   Glucose mg/dL 172* 122* 137*   BUN mg/dL 18 17 18   Creatinine mg/dL 0.9 0.9 0.9   Albumin g/dL 2.8* 2.8* 2.5*   Total  Bilirubin mg/dL 0.8 0.6 0.4   Alkaline Phosphatase U/L 91 79 76   AST U/L 12 12 12   ALT U/L 9* 12 10   Magnesium mg/dL 1.6 1.8 1.9   Phosphorus mg/dL 2.2* 2.4* 3.3       Vancomycin Administrations:  vancomycin given in the last 96 hours                     vancomycin in dextrose 5 % 1 gram/250 mL IVPB 1,000 mg (mg) 1,000 mg New Bag 12/19/22 0531     1,000 mg New Bag 12/18/22 1703     1,000 mg New Bag  0600    vancomycin 1.25 g in dextrose 5% 250 mL IVPB (ready to mix) (mg) 1,250 mg New Bag 12/17/22 0609     1,250 mg New Bag 12/16/22 2034    vancomycin (VANCOCIN) 2,000 mg in dextrose 5 % 500 mL IVPB (mg) 2,000 mg New Bag 12/16/22 0702                    Microbiologic Results:  Microbiology Results (last 7 days)       Procedure Component Value Units Date/Time    Aerobic culture [[id number]] Collected: 12/19/22 0759    Order Status: Sent Specimen: Pleural Fluid from Lung, Right Updated: 12/19/22 0817    Gram stain [[id number]] Collected: 12/19/22 0759    Order Status: Sent Specimen: Pleural Fluid from Lung, Right Updated: 12/19/22 0816    Culture, Anaerobic [[id number]] Collected: 12/19/22 0759    Order Status: Sent Specimen: Pleural Fluid from Lung, Right Updated: 12/19/22 0815    Blood culture #1 **CANNOT BE ORDERED STAT** [[id number]] Collected: 12/16/22 0224    Order Status: Completed Specimen: Blood Updated: 12/19/22 0303     Blood Culture, Routine No Growth to date      No Growth to date      No Growth to date      No Growth to date    Blood culture #2 **CANNOT BE ORDERED STAT** [[id number]] Collected: 12/16/22 0224    Order Status: Completed Specimen: Blood Updated: 12/19/22 0303     Blood Culture, Routine No Growth to date      No Growth to date      No Growth to date      No Growth to date    Culture, Respiratory with Gram Stain [[id number]] Collected: 12/16/22 0706    Order Status: Completed Specimen: Sputum Updated: 12/18/22 0548     Respiratory Culture Normal respiratory maksim     Gram Stain (Respiratory) <10  epithelial cells per low power field.     Gram Stain (Respiratory) Few WBC's     Gram Stain (Respiratory) Moderate Gram positive cocci in pairs and clusters

## 2022-12-19 NOTE — PROCEDURES
Radiology Post-Procedure Note    Pre Op Diagnosis: right pleural effusion, hx of SCCA lung, CHF  Post Op Diagnosis: Same    Procedure: Right thoracentesis    Procedure performed by: Montrell Hsu MD    Written Informed Consent Obtained: Yes  Specimen Removed: YES thin simon fluid  Estimated Blood Loss: Minimal    Findings:   Successful right thoracentesis.      Patient tolerated procedure well.    Montrell Hsu MD  Staff Radiologist  Department of Radiology  Pager: [phone]

## 2022-12-19 NOTE — NURSING
Reported off to oncoming nurse, patient resting in bed, aaox3. Patient can make needs known to staff, max assist required for adls and transfers, no acute distress noted, safety precautions maintained. Avasys at bedside.       Chart check completed.

## 2022-12-19 NOTE — SUBJECTIVE & OBJECTIVE
Past Medical History:   Diagnosis Date    Chronic obstructive pulmonary disease with acute exacerbation 9/5/2022    Combined systolic and diastolic heart failure     Dependence on supplemental oxygen 5/24/2022    History of completed stroke 9/3/2019     09/03/2019 On CT exam    History of non-ST elevation myocardial infarction (NSTEMI) 2/22/2022    Hypertension     Lung cancer     NSCLC    Lung mass     left lung    Macrocytic anemia 8/24/2019    PAD (peripheral artery disease) 3/14/2022    LUIS FELIPE 3/11/22    Peripheral artery disease        Past Surgical History:   Procedure Laterality Date    BRONCHOSCOPY N/A 08/30/2019    Procedure: Bronchoscopy;  Surgeon: [facility] Diagnostic Provider;  Location: [facility] OR [address] [place] [address];  Service: Anesthesiology;  Laterality: N/A;    CORONARY ANGIOGRAPHY N/A 03/04/2022    Procedure: ANGIOGRAM, CORONARY ARTERY;  Surgeon: Robson Green MD;  Location: [facility] CATH LAB;  Service: Cardiology;  Laterality: N/A;    INSERTION OF TUNNELED CENTRAL VENOUS CATHETER (CVC) WITH SUBCUTANEOUS PORT         Review of patient's allergies indicates:  No Known Allergies    Medications:  Continuous Infusions:  Scheduled Meds:   albuterol-ipratropium  3 mL Nebulization Q4H    amLODIPine  10 mg Oral Daily    [START ON 12/20/2022] apixaban  5 mg Oral BID    atorvastatin  80 mg Oral Daily    cefTRIAXone (ROCEPHIN) IVPB  2 g Intravenous Q24H    cilostazoL  100 mg Oral BID    [START ON 12/20/2022] clopidogreL  75 mg Oral Daily    doxycycline  100 mg Oral Q12H    furosemide  40 mg Oral BID    guaiFENesin  1,200 mg Oral BID    metoprolol tartrate  50 mg Oral QID    polyethylene glycol  17 g Oral Daily    vancomycin (VANCOCIN) IVPB  1,000 mg Intravenous Q12H     PRN Meds:acetaminophen, acetaminophen, albuterol-ipratropium, aluminum-magnesium hydroxide-simethicone, benzonatate, bisacodyL, dextrose 10%, dextrose 10%, glucagon (human recombinant), glucose, glucose, HYDROcodone-acetaminophen, magnesium oxide, magnesium oxide,  melatonin, naloxone, ondansetron, potassium bicarbonate, potassium bicarbonate, potassium bicarbonate, potassium, sodium phosphates, potassium, sodium phosphates, potassium, sodium phosphates, prochlorperazine, simethicone, sodium chloride 0.9%, Pharmacy to dose Vancomycin consult **AND** vancomycin - pharmacy to dose    Family History       Problem Relation (Age of Onset)    Cancer Father, Sister    Diabetes Mother    Heart defect Mother    No Known Problems Son          Tobacco Use    Smoking status: Former     Packs/day: 1.00     Years: 25.00     Pack years: 25.00     Types: Cigarettes     Quit date:      Years since quittin.9    Smokeless tobacco: Never   Substance and Sexual Activity    Alcohol use: Yes     Comment: 11/3/22: Occ.    Drug use: Never    Sexual activity: Yes     Partners: Female       Review of Systems   Constitutional:  Positive for activity change and appetite change.   Respiratory:  Positive for cough and shortness of breath.    Gastrointestinal: Negative.    Musculoskeletal:  Positive for myalgias.   Neurological:  Positive for weakness.   Objective:     Vital Signs (Most Recent):  Temp: 97.6 °F (36.4 °C) (22 1110)  Pulse: 97 (22 1112)  Resp: 20 (22 1112)  BP: 102/63 (22 1110)  SpO2: 98 % (22 1112) Vital Signs (24h Range):  Temp:  [97.6 °F (36.4 °C)-98.4 °F (36.9 °C)] 97.6 °F (36.4 °C)  Pulse:  [] 97  Resp:  [17-21] 20  SpO2:  [92 %-100 %] 98 %  BP: (102-147)/(60-79) 102/63     Weight: 87.5 kg (192 lb 14.4 oz)  Body mass index is 27.68 kg/m².    Physical Exam  Vitals and nursing note reviewed.   Constitutional:       General: He is not in acute distress.     Appearance: He is ill-appearing. He is not toxic-appearing or diaphoretic.   HENT:      Head: Normocephalic.      Right Ear: External ear normal.      Left Ear: External ear normal.      Nose: Nose normal.   Eyes:      General:         Right eye: No discharge.         Left eye: No discharge.    Cardiovascular:      Rate and Rhythm: Tachycardia present.   Pulmonary:      Breath sounds: Wheezing (expiratory) present.      Comments: Increased work of breathing with vapotherm  Abdominal:      Palpations: Abdomen is soft.   Musculoskeletal:         General: Swelling (left elbow and forearm) present.   Skin:     General: Skin is warm and dry.   Neurological:      Mental Status: He is alert.      Motor: Weakness present.      Comments: Oriented x 2, having some confusion regarding timeline    Psychiatric:         Mood and Affect: Mood normal.         Behavior: Behavior normal.       Review of Symptoms      Symptom Assessment (ESAS 0-10 Scale)  Pain:  0  Dyspnea:  0  Anxiety:  0  Nausea:  0  Depression:  0  Anorexia:  0  Fatigue:  0  Insomnia:  0  Restlessness:  0  Agitation:  0           Advance Care Planning   Advance Directives:   Living Will: No    Do Not Resuscitate Status: No      Decision Making:  Patient answered questions       Significant Labs: All pertinent labs within the past 24 hours have been reviewed.  CBC:   Recent Labs   Lab 12/19/22  0504   WBC 6.94   HGB 10.0*   HCT 29.3*   MCV 89        BMP:  Recent Labs   Lab 12/19/22  0504   *      K 4.0      CO2 26   BUN 18   CREATININE 0.9   CALCIUM 8.7   MG 1.9     LFT:  Lab Results   Component Value Date    AST 12 12/19/2022    ALKPHOS 76 12/19/2022    BILITOT 0.4 12/19/2022     Albumin:   Albumin   Date Value Ref Range Status   12/19/2022 2.5 (L) 3.5 - 5.2 g/dL Final     Protein:   Total Protein   Date Value Ref Range Status   12/19/2022 5.8 (L) 6.0 - 8.4 g/dL Final     Lactic acid:   Lab Results   Component Value Date    LACTATE 1.9 12/16/2022    LACTATE 2.3 (H) 12/16/2022       Significant Imaging: I have reviewed all pertinent imaging results/findings within the past 24 hours.  CXR

## 2022-12-19 NOTE — NURSING
Report received from Carolina in IR, reported 650 cc removed, site is covered with vaseline gauze and dressing. Reported that specimens were sent. Patient to return to room 303.

## 2022-12-20 NOTE — PROGRESS NOTES
Lab called awaiting vancomycin trough was drawn at 0453 no results of yet. Vancomycin due due at 6 am. Lab stated result are still pending. Will continue to monitor for pending results to admin dose.

## 2022-12-20 NOTE — ASSESSMENT & PLAN NOTE
Current presentation does not suggest COPD exacerbation.    · Continue bronchodilators  · Titrate oxygen as tolerated

## 2022-12-20 NOTE — PROGRESS NOTES
[facility] - Telemetry  Palliative Medicine  Progress Note    Patient Name: Kashif Iverson  MRN: [id number]  Admission Date: 12/16/2022  Hospital Length of Stay: 4 days  Code Status: Full Code   Attending Provider: Neel Dupree MD  Consulting Provider: lEi Arango NP  Primary Care Physician: Eduardo Ruiz MD  Principal Problem:Acute on chronic respiratory failure with hypoxia      Assessment/Plan:   Palliative encounter:  -Interval chart reviewed  -F/u with patient who states he is feeling better today. He is still having periods of confusion during conversation and gets off subject easily.   -No changes in POC at this time; remain full code continue tx as long as tolerating.  -addressed his questions      Palliative encounter: 12/19/22  Advance Care Planning   -Palliative consult received  -Patient known to palliative see ACP notes   -Chart reviewed in detail  -At time of consult patient up in bed with vapotherm on. He is alert and oriented but having trouble remembering why he came in until  I reminded him. He states   he feels better today.   -we discussed his current clinical condition, co morbids, hospitalizations, infections, and QOL   He states he feels like he has tolerated the chemo tx fair and would like to continue it as long as he tolerates it. He does seem to understand it is not curative but palliative. Noted last oncology office appt. Scans are stable  -I let him know that at some point the doctor may tell him he is no longer a candidate for tx and or he may decide he does not want any more tx, and at this point he would benefit from hospice. He could potentially meet criteria for COPD alone outside of lung cancer dx if determined it is not related.  This would be determined by the hospice medical director of the agency  -He was open to the hospice discussion today and we discussed in detail. This is a change as he has been very reluctant to discuss in past. He had several questions about hospice  "which I answered. He is not agreeable to hospice at this time.  -updated Dr Dupree         Acute on chronic respiratory failure with hypoxia  -COPD exacerbation/PNA,  Lung ca all contributing factors  -mgmt per primary        Pleural effusion  -Pulmonology consulted  "Present on chest CTA from 12/17 but not present on most recent CT from a month before.  History of diastolic CHF, as well as lung cancer with metastases to right-sided ribs.   Given the timeframe of CT changes, I suspect this is likely due to CHF (probably diastolic) rather than infection or cancer.  However, given known cancer and immunosuppression from chemotherapy, I think that right thoracentesis is appropriate when anticoagulation can be safely held."        Persistent atrial fibrillation     Chronic obstructive pulmonary disease with acute exacerbation  - History of COPD on supplemental O2, 2L NC at home   - Presented with worsening SOB   - Likely related to a COPD exacerbation in the setting of pneumonia    -mgmt per primary     Coronary artery disease involving native coronary artery of native heart without angina pectoris        Chronic diastolic heart failure  -"Echocardiogram (9/30/2022): EF: 60%, mildly reduced RV systolic function, PA pressure of 40 mmHg."       Lung cancer, main bronchus, left  -On maintenance chemotherapy and radiation   -Outpatient follow up with palliative care/oncology         HAP (hospital-acquired pneumonia)  "CTA showed no PE, presistent and worsening occlusion of left upper lobe bronchus with superimposed interstitial and alveolar infiltrates., increased ground glass opacities, moderate to large right pleural effusion, small left pleural effusion and other chronic stable change. 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      Patient reports developing worsening shortness of breath and productive cough since he took his last chemotherapy dose on 12/13. He is on 2L O2 at home, and SpO2 readings were as low as 80%, and would not improved despite increasing the O2 to 4L. He has multiple admissions for similar presentation. No longer a smoker. In the ED, the patient was tachycardic, tachypnic and hypoxic requiring 12L O2. Labs showed leukocytosis (16.9), elevated d-dimer (1.94), hypokalemia (3.4), elevated BNP (355), elevated troponin (0.089), elevated lactic acid (2.3). CTA showed no PE, presistent and worsening occlusion of left upper lobe bronchus with superimposed interstitial and alveolar infiltrates., increased ground glass opacities, moderate to large right pleural effusion, small left pleural effusion and other chronic stable changes.  . He was given fluids, vancomycin, zosyn, and DuoNeb x1. The patient was admitted for further management.         Overview/Hospital Course:  Lung Cancer with superimposed Pneumonia. Staph-like organisms growing in sputum, continue broad coverage until results.  Pulmonology consulted, appreciate assessment, will ask IR to tap the new pleural effusion for distinguish meant between malignant effusion, cardiac/HF effusion, or infection- thoracentesis labs ordered. Multiple hospital admissions the last few months, poor medical understanding. Palliative care consult placed for 12/19. Thoracentesis labs sent 12/19, awaiting results.       Hospital Course:  No notes on file    Interval History: patient eating breakfast. Denies pain or dyspnea  currently    Medications:  Continuous Infusions:  Scheduled Meds:   albuterol-ipratropium  3 mL Nebulization Q4H    amLODIPine  10 mg Oral Daily    apixaban  5 mg Oral BID    atorvastatin  80 mg Oral Daily    cefTRIAXone (ROCEPHIN) 2 g in dextrose 5 % 50 mL IVPB (ready to mix system)  2 g Intravenous Q24H    cilostazoL  100 mg Oral BID    clopidogreL  75 mg Oral Daily    doxycycline  100 mg Oral Q12H    furosemide  40 mg Oral BID    guaiFENesin  1,200 mg Oral BID    metoprolol tartrate  50 mg Oral QID    polyethylene glycol  17 g Oral Daily     PRN Meds:acetaminophen, acetaminophen, albuterol-ipratropium, aluminum-magnesium hydroxide-simethicone, benzonatate, bisacodyL, dextrose 10%, dextrose 10%, glucagon (human recombinant), glucose, glucose, HYDROcodone-acetaminophen, magnesium oxide, magnesium oxide, melatonin, naloxone, ondansetron, potassium bicarbonate, potassium bicarbonate, potassium bicarbonate, potassium, sodium phosphates, potassium, sodium phosphates, potassium, sodium phosphates, prochlorperazine, simethicone, sodium chloride 0.9%, Pharmacy to dose Vancomycin consult **AND** vancomycin - pharmacy to dose    Objective:     Vital Signs (Most Recent):  Temp: 97.8 °F (36.6 °C) (12/20/22 1125)  Pulse: 108 (12/20/22 1224)  Resp: 20 (12/20/22 1224)  BP: 121/71 (12/20/22 1125)  SpO2: 95 % (12/20/22 1224) Vital Signs (24h Range):  Temp:  [97.8 °F (36.6 °C)-98.4 °F (36.9 °C)] 97.8 °F (36.6 °C)  Pulse:  [] 108  Resp:  [18-22] 20  SpO2:  [92 %-98 %] 95 %  BP: (114-155)/(71-83) 121/71     Weight: 87.5 kg (192 lb 14.4 oz)  Body mass index is 27.68 kg/m².    Physical Exam  Vitals and nursing note reviewed.   Constitutional:       General: He is not in acute distress.     Appearance: He is ill-appearing. He is not toxic-appearing or diaphoretic.   HENT:      Head: Normocephalic.      Right Ear: External ear normal.      Left Ear: External ear normal.      Nose: Nose normal.   Eyes:      General:          Right eye: No discharge.         Left eye: No discharge.   Cardiovascular:      Rate and Rhythm: Tachycardia present.   Pulmonary:      Breath sounds: Wheezing (expiratory) present.      Comments: Increased work of breathing. Decreased oxygen demand to 8 liters   Abdominal:      Palpations: Abdomen is soft.   Musculoskeletal:         General: Swelling (left elbow and forearm) present.   Skin:     General: Skin is warm and dry.   Neurological:      Mental Status: He is alert.      Motor: Weakness present.      Comments: Oriented x 2, having some confusion regarding timeline    Psychiatric:         Mood and Affect: Mood normal.         Behavior: Behavior normal.       Review of Symptoms      Symptom Assessment (ESAS 0-10 Scale)  Pain:  0  Dyspnea:  0  Anxiety:  0  Nausea:  0  Depression:  0  Anorexia:  0  Fatigue:  0  Insomnia:  0  Restlessness:  0  Agitation:  0           Advance Care Planning   Advance Directives:   Goals of Care: What is most important right now is to focus on spending time at home, avoiding the hospital, remaining as independent as possible, symptom/pain control, quality of life, even if it means sacrificing a little time. Accordingly, we have decided that the best plan to meet the patient's goals includes enrolling in hospice care.       Significant Labs: All pertinent labs within the past 24 hours have been reviewed.  CBC:   Recent Labs   Lab 12/20/22  0457   WBC 8.23   HGB 11.5*   HCT 33.5*   MCV 88        BMP:  Recent Labs   Lab 12/20/22 0457   *      K 3.6      CO2 25   BUN 16   CREATININE 1.0   CALCIUM 8.7   MG 1.6     LFT:  Lab Results   Component Value Date    AST 23 12/20/2022    ALKPHOS 107 12/20/2022    BILITOT 0.4 12/20/2022     Albumin:   Albumin   Date Value Ref Range Status   12/20/2022 2.8 (L) 3.5 - 5.2 g/dL Final     Protein:   Total Protein   Date Value Ref Range Status   12/20/2022 6.4 6.0 - 8.4 g/dL Final     Lactic acid:   Lab Results    Component Value Date    LACTATE 1.9 12/16/2022    LACTATE 2.3 (H) 12/16/2022       Significant Imaging: U/S: I have reviewed all pertinent results/findings within the past 24 hours:  left arm    > 50% of 25 mins spent in chart review, face to face discussion, goals of care, emotional support, symptom assessment, coordination of care with other specialists and d/c planning.       Eli Arango NP  Palliative Medicine  [facility]

## 2022-12-20 NOTE — ASSESSMENT & PLAN NOTE
Patient with Hypoxic Respiratory failure which is Acute on chronic.  he is on home oxygen at 2 LPM. Supplemental oxygen was provided and noted- Oxygen Concentration (%):  [100] 100.   Signs/symptoms of respiratory failure include- increased work of breathing and respiratory distress. Contributing diagnoses includes - COPD and Pneumonia Labs and images were reviewed. Patient Has recent ABG, which has been reviewed. Will treat underlying causes and adjust management of respiratory failure as follows- see plans for HAP & COPD exacerbation   Oxygen reduced from 12L to 8L 12/19

## 2022-12-20 NOTE — ASSESSMENT & PLAN NOTE
-diagnosed in 2019 with squamous cell carcinoma.  S/p palliative xrt and chemo.    Most recent dose of gemcitabine on 12/13/2022.

## 2022-12-20 NOTE — PROGRESS NOTES
"Pt now AFIB with RVR pt denies any discomfort. Pt asymptomatic. B/p 141/86  120'-130"sMD notified. Awaiting order to be placed to admin  9 am oral metoprolol early. Will continue to monitor.  "

## 2022-12-20 NOTE — PROGRESS NOTES
Pharmacokinetic Assessment Follow Up: IV Vancomycin    Vancomycin serum concentration assessment(s):    The trough level was drawn correctly and can be used to guide therapy at this time. The measurement is above the desired definitive target range of 10 to 20 mcg/mL.    Vancomycin Regimen Plan:    Discontinue the scheduled vancomycin regimen and re-dose when the random level is less than 20 mcg/mL, next level to be drawn at 1700 on 12/20/2022.    Drug levels (last 3 results):  Recent Labs   Lab Result Units 12/17/22  1754 12/19/22  0504 12/20/22  0457   Vancomycin-Trough ug/mL 20.6 19.4 22.1*       Pharmacy will continue to follow and monitor vancomycin.    Please contact pharmacy at extension [phone] for questions regarding this assessment.    Thank you for the consult,   Kingston Walker Jr       Patient brief summary:  Kashif Iverson is a 62 y.o. male initiated on antimicrobial therapy with IV Vancomycin for treatment of  pneumonia    Drug Allergies:   Review of patient's allergies indicates:  No Known Allergies    Actual Body Weight:   87.5 kg    Renal Function:   Estimated Creatinine Clearance: 79.1 mL/min (based on SCr of 1 mg/dL).,     Dialysis Method (if applicable):  N/A    CBC (last 72 hours):  Recent Labs   Lab Result Units 12/17/22  0825 12/18/22  0511 12/19/22  0504 12/20/22  0457   WBC K/uL 18.95* 14.98* 6.94 8.23   Hemoglobin g/dL 11.4* 10.9* 10.0* 11.5*   Hematocrit % 33.3* 31.0* 29.3* 33.5*   Platelets K/uL 358 337 282 305   Gran % % 95.3* 92.6* 79.7* 64.7   Lymph % % 2.8* 5.0* 12.8* 18.5   Mono % % 0.9* 1.3* 6.2 13.1   Eosinophil % % 0.2 0.1 0.0 0.4   Basophil % % 0.1 0.2 0.0 0.7   Differential Method  Automated Automated Automated Automated       Metabolic Panel (last 72 hours):  Recent Labs   Lab Result Units 12/17/22  0825 12/18/22  0511 12/19/22  0504 12/19/22  0800 12/20/22  0457   Sodium mmol/L 136 136 139  --  139   Potassium mmol/L 2.9* 3.1* 4.0  --  3.6   Chloride mmol/L 99 101 104  --  101    CO2 mmol/L 26 25 26  --  25   Glucose mg/dL 172* 122* 137*  --  139*   Glucose, Fluid mg/dL  --   --   --  151  --    BUN mg/dL 18 17 18  --  16   Creatinine mg/dL 0.9 0.9 0.9  --  1.0   Albumin g/dL 2.8* 2.8* 2.5*  --  2.8*   Total Bilirubin mg/dL 0.8 0.6 0.4  --  0.4   Alkaline Phosphatase U/L 91 79 76  --  107   AST U/L 12 12 12  --  23   ALT U/L 9* 12 10  --  22   Magnesium mg/dL 1.6 1.8 1.9  --  1.6   Phosphorus mg/dL 2.2* 2.4* 3.3  --  3.0       Vancomycin Administrations:  vancomycin given in the last 96 hours                     vancomycin in dextrose 5 % 1 gram/250 mL IVPB 1,000 mg (mg) 1,000 mg New Bag 12/19/22 1701     1,000 mg New Bag  0531     1,000 mg New Bag 12/18/22 1703     1,000 mg New Bag  0600    vancomycin 1.25 g in dextrose 5% 250 mL IVPB (ready to mix) (mg) 1,250 mg New Bag 12/17/22 0609     1,250 mg New Bag 12/16/22 2034                    Microbiologic Results:  Microbiology Results (last 7 days)       Procedure Component Value Units Date/Time    Blood culture #2 **CANNOT BE ORDERED STAT** [[id number]] Collected: 12/16/22 0224    Order Status: Completed Specimen: Blood Updated: 12/20/22 0303     Blood Culture, Routine No Growth after 4 days.    Blood culture #1 **CANNOT BE ORDERED STAT** [[id number]] Collected: 12/16/22 0224    Order Status: Completed Specimen: Blood Updated: 12/20/22 0303     Blood Culture, Routine No Growth after 4 days.    Gram stain [[id number]] Collected: 12/19/22 0759    Order Status: Completed Specimen: Pleural Fluid from Lung, Right Updated: 12/19/22 0843     Gram Stain Result Cytospin indicates:      Many WBC's      No organisms seen    Aerobic culture [[id number]] Collected: 12/19/22 0759    Order Status: Sent Specimen: Pleural Fluid from Lung, Right Updated: 12/19/22 0817    Culture, Anaerobic [[id number]] Collected: 12/19/22 0759    Order Status: Sent Specimen: Pleural Fluid from Lung, Right Updated: 12/19/22 0815    Culture, Respiratory with Gram Stain [[id number]]  Collected: 12/16/22 0706    Order Status: Completed Specimen: Sputum Updated: 12/18/22 0548     Respiratory Culture Normal respiratory maksim     Gram Stain (Respiratory) <10 epithelial cells per low power field.     Gram Stain (Respiratory) Few WBC's     Gram Stain (Respiratory) Moderate Gram positive cocci in pairs and clusters

## 2022-12-20 NOTE — ASSESSMENT & PLAN NOTE
Patient with Hypoxic Respiratory failure which is Acute on chronic.  he is on home oxygen at 1-2 LPM. Supplemental oxygen was provided and noted-  .   Signs/symptoms of respiratory failure include- tachypnea and increased work of breathing. Contributing diagnoses includes - COPD and Pleural effusion Labs and images were reviewed. Patient Has recent ABG, which has been reviewed. Will treat underlying causes and adjust management of respiratory failure as follows-     Most likely multifactorial due to combination of diastolic CHF, chronic COPD, possible pneumonia, and known lung cancer with complications of treatment.    · S/p thoracentesis on 12/19 with 650 cc fluid aspirated. Per patient breathing improved after thora.   · Bronchodilators for COPD  · Diuresis as tolerated  · Empiric antibiotics.  Wbc is trending down.  Recommend 5-7 days course.  Ok to transition to po antibiotics such as oral levaquin.    · Wean oxygen as tolerated.

## 2022-12-20 NOTE — ASSESSMENT & PLAN NOTE
-Given new right pleural effusion, modest increase in natriuretic peptide, and history of CHF => I suspect this is more likely acutely decompensated diastolic CHF.  -Diuresis as tolerated.

## 2022-12-20 NOTE — PLAN OF CARE
Problem: Adult Inpatient Plan of Care  Goal: Plan of Care Review  Outcome: Ongoing, Progressing  Goal: Patient-Specific Goal (Individualized)  Outcome: Ongoing, Progressing  Goal: Absence of Hospital-Acquired Illness or Injury  Outcome: Ongoing, Progressing  Goal: Optimal Comfort and Wellbeing  Outcome: Ongoing, Progressing  Goal: Readiness for Transition of Care  Outcome: Ongoing, Progressing     Problem: Fluid Imbalance (Pneumonia)  Goal: Fluid Balance  Outcome: Ongoing, Progressing     Problem: Infection (Pneumonia)  Goal: Resolution of Infection Signs and Symptoms  Outcome: Ongoing, Progressing     Problem: Respiratory Compromise (Pneumonia)  Goal: Effective Oxygenation and Ventilation  Outcome: Ongoing, Progressing     Problem: Skin Injury Risk Increased  Goal: Skin Health and Integrity  Outcome: Ongoing, Progressing     Problem: Infection  Goal: Absence of Infection Signs and Symptoms  Outcome: Ongoing, Progressing     Problem: Fall Injury Risk  Goal: Absence of Fall and Fall-Related Injury  Outcome: Ongoing, Progressing     Problem: Thought Process Alteration  Goal: Optimal Thought Clarity  Outcome: Ongoing, Progressing     Problem: ARDS (Acute Respiratory Distress Syndrome)  Goal: Effective Oxygenation  Outcome: Ongoing, Progressing     Problem: Dysrhythmia  Goal: Normalized Cardiac Rhythm  Outcome: Ongoing, Progressing     Problem: Coping Ineffective  Goal: Effective Coping  Outcome: Ongoing, Progressing

## 2022-12-20 NOTE — PROGRESS NOTES
[facility] - Telemetry  Pulmonology  Progress Note    Patient Name: Kashfi Iverson  MRN: [id number]  Admission Date: 12/16/2022  Hospital Length of Stay: 4 days  Code Status: Full Code  Attending Provider: Neel Dupree MD  Primary Care Provider: Eduardo Ruiz MD   Principal Problem: Acute on chronic respiratory failure with hypoxia    Subjective:     Interval History: no acute issue.      Objective:     Vital Signs (Most Recent):  Temp: 97.8 °F (36.6 °C) (12/20/22 1125)  Pulse: 108 (12/20/22 1224)  Resp: 20 (12/20/22 1224)  BP: 121/71 (12/20/22 1125)  SpO2: 95 % (12/20/22 1224) Vital Signs (24h Range):  Temp:  [97.8 °F (36.6 °C)-98.4 °F (36.9 °C)] 97.8 °F (36.6 °C)  Pulse:  [] 108  Resp:  [18-22] 20  SpO2:  [92 %-98 %] 95 %  BP: (114-155)/(71-83) 121/71     Weight: 87.5 kg (192 lb 14.4 oz)  Body mass index is 27.68 kg/m².      Intake/Output Summary (Last 24 hours) at 12/20/2022 1359  Last data filed at 12/20/2022 0818  Gross per 24 hour   Intake 600 ml   Output 900 ml   Net -300 ml       Physical Exam  Vitals and nursing note reviewed.   Cardiovascular:      Rate and Rhythm: Normal rate and regular rhythm.      Heart sounds: Normal heart sounds. No murmur heard.    No gallop.   Pulmonary:      Effort: No respiratory distress.      Breath sounds: No wheezing or rales.   Musculoskeletal:      Right lower leg: No edema.      Left lower leg: No edema.   Neurological:      General: No focal deficit present.      Mental Status: He is alert.   Psychiatric:      Comments: Poorly directable during interview.       Vents:  Oxygen Concentration (%): 100 (12/16/22 0056)    Lines/Drains/Airways       Peripheral Intravenous Line  Duration                  Midline Catheter Insertion/Assessment  - Single Lumen 12/18/22 0626 Right brachial vein  2 days         Peripheral IV - Single Lumen 12/18/22 0431 20 G Distal;Posterior;Right Forearm 2 days                    Significant Labs:    CBC/Anemia Profile:  Recent Labs   Lab  12/19/22  0504 12/20/22  0457   WBC 6.94 8.23   HGB 10.0* 11.5*   HCT 29.3* 33.5*    305   MCV 89 88   RDW 14.8* 14.9*        Chemistries:  Recent Labs   Lab 12/19/22  0504 12/20/22  0457    139   K 4.0 3.6    101   CO2 26 25   BUN 18 16   CREATININE 0.9 1.0   CALCIUM 8.7 8.7   ALBUMIN 2.5* 2.8*   PROT 5.8* 6.4   BILITOT 0.4 0.4   ALKPHOS 76 107   ALT 10 22   AST 12 23   MG 1.9 1.6   PHOS 3.3 3.0       ABGs: No results for input(s): PH, PCO2, HCO3, POCSATURATED, BE in the last 48 hours.    Thoracentesis 12/19/22  Wbc 1674; L48; m37  Ldh 271  Protein 2.8  Gram stain negative for organism.  Cytology pending    Significant Imaging:  I have reviewed all pertinent imaging results/findings within the past 24 hours.  CT: I have reviewed all pertinent results/findings within the past 24 hours and my personal findings are:  12/16/22no central filling defect.  Persitent georges atelectasis.  Moderate right effusion  CXR: I have reviewed all pertinent results/findings within the past 24 hours and my personal findings are:  12/19/22 left lung unchanged.  Right lung with improve aeration.        ABG  Recent Labs   Lab 12/16/22  0056   PH 7.513*   PO2 124*   PCO2 33.8*   HCO3 27.1   BE 4     Assessment/Plan:     * Acute on chronic respiratory failure with hypoxia  Patient with Hypoxic Respiratory failure which is Acute on chronic.  he is on home oxygen at 1-2 LPM. Supplemental oxygen was provided and noted-  .   Signs/symptoms of respiratory failure include- tachypnea and increased work of breathing. Contributing diagnoses includes - COPD and Pleural effusion Labs and images were reviewed. Patient Has recent ABG, which has been reviewed. Will treat underlying causes and adjust management of respiratory failure as follows-     Most likely multifactorial due to combination of diastolic CHF, chronic COPD, possible pneumonia, and known lung cancer with complications of treatment.    · S/p thoracentesis on 12/19 with  650 cc fluid aspirated. Per patient breathing improved after thora.   · Bronchodilators for COPD  · Diuresis as tolerated  · Empiric antibiotics.  Wbc is trending down.  Recommend 5-7 days course.  Ok to transition to po antibiotics such as oral levaquin.    · Wean oxygen as tolerated.      Pleural effusion  - volume overload vs infection vs malignancy  - chemistry c/w exudative.  No bacteria on gram stain.  cxr with improvement of right lung.  - diurese  - follow up on cytology result.     Chronic obstructive pulmonary disease with acute exacerbation  Current presentation does not suggest COPD exacerbation.    · Continue bronchodilators  · Titrate oxygen as tolerated      Chronic diastolic heart failure  -Given new right pleural effusion, modest increase in natriuretic peptide, and history of CHF => I suspect this is more likely acutely decompensated diastolic CHF.  -Diuresis as tolerated.    Lung cancer, main bronchus, left  -diagnosed in 2019 with squamous cell carcinoma.  S/p palliative xrt and chemo.    Most recent dose of gemcitabine on 12/13/2022.             Vivek Adair MD  Pulmonology  [facility] - Telemetry      Will be available upon request.

## 2022-12-20 NOTE — ASSESSMENT & PLAN NOTE
- volume overload vs infection vs malignancy  - chemistry c/w exudative.  No bacteria on gram stain.  cxr with improvement of right lung.  - diurese  - follow up on cytology result.

## 2022-12-20 NOTE — SUBJECTIVE & OBJECTIVE
Interval History: no acute issue.      Objective:     Vital Signs (Most Recent):  Temp: 97.8 °F (36.6 °C) (12/20/22 1125)  Pulse: 108 (12/20/22 1224)  Resp: 20 (12/20/22 1224)  BP: 121/71 (12/20/22 1125)  SpO2: 95 % (12/20/22 1224) Vital Signs (24h Range):  Temp:  [97.8 °F (36.6 °C)-98.4 °F (36.9 °C)] 97.8 °F (36.6 °C)  Pulse:  [] 108  Resp:  [18-22] 20  SpO2:  [92 %-98 %] 95 %  BP: (114-155)/(71-83) 121/71     Weight: 87.5 kg (192 lb 14.4 oz)  Body mass index is 27.68 kg/m².      Intake/Output Summary (Last 24 hours) at 12/20/2022 1359  Last data filed at 12/20/2022 0818  Gross per 24 hour   Intake 600 ml   Output 900 ml   Net -300 ml       Physical Exam  Vitals and nursing note reviewed.   Cardiovascular:      Rate and Rhythm: Normal rate and regular rhythm.      Heart sounds: Normal heart sounds. No murmur heard.    No gallop.   Pulmonary:      Effort: No respiratory distress.      Breath sounds: No wheezing or rales.   Musculoskeletal:      Right lower leg: No edema.      Left lower leg: No edema.   Neurological:      General: No focal deficit present.      Mental Status: He is alert.   Psychiatric:      Comments: Poorly directable during interview.       Vents:  Oxygen Concentration (%): 100 (12/16/22 0056)    Lines/Drains/Airways       Peripheral Intravenous Line  Duration                  Midline Catheter Insertion/Assessment  - Single Lumen 12/18/22 0626 Right brachial vein  2 days         Peripheral IV - Single Lumen 12/18/22 0431 20 G Distal;Posterior;Right Forearm 2 days                    Significant Labs:    CBC/Anemia Profile:  Recent Labs   Lab 12/19/22  0504 12/20/22  0457   WBC 6.94 8.23   HGB 10.0* 11.5*   HCT 29.3* 33.5*    305   MCV 89 88   RDW 14.8* 14.9*        Chemistries:  Recent Labs   Lab 12/19/22  0504 12/20/22  0457    139   K 4.0 3.6    101   CO2 26 25   BUN 18 16   CREATININE 0.9 1.0   CALCIUM 8.7 8.7   ALBUMIN 2.5* 2.8*   PROT 5.8* 6.4   BILITOT 0.4 0.4    ALKPHOS 76 107   ALT 10 22   AST 12 23   MG 1.9 1.6   PHOS 3.3 3.0       ABGs: No results for input(s): PH, PCO2, HCO3, POCSATURATED, BE in the last 48 hours.    Thoracentesis 12/19/22  Wbc 1674; L48; m37  Ldh 271  Protein 2.8  Gram stain negative for organism.  Cytology pending    Significant Imaging:  I have reviewed all pertinent imaging results/findings within the past 24 hours.  CT: I have reviewed all pertinent results/findings within the past 24 hours and my personal findings are:  12/16/22no central filling defect.  Persitent georges atelectasis.  Moderate right effusion  CXR: I have reviewed all pertinent results/findings within the past 24 hours and my personal findings are:  12/19/22 left lung unchanged.  Right lung with improve aeration.

## 2022-12-20 NOTE — SUBJECTIVE & OBJECTIVE
Interval History: patient eating breakfast. Denies pain or dyspnea currently    Medications:  Continuous Infusions:  Scheduled Meds:   albuterol-ipratropium  3 mL Nebulization Q4H    amLODIPine  10 mg Oral Daily    apixaban  5 mg Oral BID    atorvastatin  80 mg Oral Daily    cefTRIAXone (ROCEPHIN) 2 g in dextrose 5 % 50 mL IVPB (ready to mix system)  2 g Intravenous Q24H    cilostazoL  100 mg Oral BID    clopidogreL  75 mg Oral Daily    doxycycline  100 mg Oral Q12H    furosemide  40 mg Oral BID    guaiFENesin  1,200 mg Oral BID    metoprolol tartrate  50 mg Oral QID    polyethylene glycol  17 g Oral Daily     PRN Meds:acetaminophen, acetaminophen, albuterol-ipratropium, aluminum-magnesium hydroxide-simethicone, benzonatate, bisacodyL, dextrose 10%, dextrose 10%, glucagon (human recombinant), glucose, glucose, HYDROcodone-acetaminophen, magnesium oxide, magnesium oxide, melatonin, naloxone, ondansetron, potassium bicarbonate, potassium bicarbonate, potassium bicarbonate, potassium, sodium phosphates, potassium, sodium phosphates, potassium, sodium phosphates, prochlorperazine, simethicone, sodium chloride 0.9%, Pharmacy to dose Vancomycin consult **AND** vancomycin - pharmacy to dose    Objective:     Vital Signs (Most Recent):  Temp: 97.8 °F (36.6 °C) (12/20/22 1125)  Pulse: 108 (12/20/22 1224)  Resp: 20 (12/20/22 1224)  BP: 121/71 (12/20/22 1125)  SpO2: 95 % (12/20/22 1224) Vital Signs (24h Range):  Temp:  [97.8 °F (36.6 °C)-98.4 °F (36.9 °C)] 97.8 °F (36.6 °C)  Pulse:  [] 108  Resp:  [18-22] 20  SpO2:  [92 %-98 %] 95 %  BP: (114-155)/(71-83) 121/71     Weight: 87.5 kg (192 lb 14.4 oz)  Body mass index is 27.68 kg/m².    Physical Exam  Vitals and nursing note reviewed.   Constitutional:       General: He is not in acute distress.     Appearance: He is ill-appearing. He is not toxic-appearing or diaphoretic.   HENT:      Head: Normocephalic.      Right Ear: External ear normal.      Left Ear: External ear  normal.      Nose: Nose normal.   Eyes:      General:         Right eye: No discharge.         Left eye: No discharge.   Cardiovascular:      Rate and Rhythm: Tachycardia present.   Pulmonary:      Breath sounds: Wheezing (expiratory) present.      Comments: Increased work of breathing. Decreased oxygen demand to 8 liters   Abdominal:      Palpations: Abdomen is soft.   Musculoskeletal:         General: Swelling (left elbow and forearm) present.   Skin:     General: Skin is warm and dry.   Neurological:      Mental Status: He is alert.      Motor: Weakness present.      Comments: Oriented x 2, having some confusion regarding timeline    Psychiatric:         Mood and Affect: Mood normal.         Behavior: Behavior normal.       Review of Symptoms      Symptom Assessment (ESAS 0-10 Scale)  Pain:  0  Dyspnea:  0  Anxiety:  0  Nausea:  0  Depression:  0  Anorexia:  0  Fatigue:  0  Insomnia:  0  Restlessness:  0  Agitation:  0           Advance Care Planning   Advance Directives:   Goals of Care: What is most important right now is to focus on spending time at home, avoiding the hospital, remaining as independent as possible, symptom/pain control, quality of life, even if it means sacrificing a little time. Accordingly, we have decided that the best plan to meet the patient's goals includes enrolling in hospice care.       Significant Labs: All pertinent labs within the past 24 hours have been reviewed.  CBC:   Recent Labs   Lab 12/20/22 0457   WBC 8.23   HGB 11.5*   HCT 33.5*   MCV 88        BMP:  Recent Labs   Lab 12/20/22 0457   *      K 3.6      CO2 25   BUN 16   CREATININE 1.0   CALCIUM 8.7   MG 1.6     LFT:  Lab Results   Component Value Date    AST 23 12/20/2022    ALKPHOS 107 12/20/2022    BILITOT 0.4 12/20/2022     Albumin:   Albumin   Date Value Ref Range Status   12/20/2022 2.8 (L) 3.5 - 5.2 g/dL Final     Protein:   Total Protein   Date Value Ref Range Status   12/20/2022 6.4 6.0 -  8.4 g/dL Final     Lactic acid:   Lab Results   Component Value Date    LACTATE 1.9 12/16/2022    LACTATE 2.3 (H) 12/16/2022       Significant Imaging: U/S: I have reviewed all pertinent results/findings within the past 24 hours:  left arm

## 2022-12-20 NOTE — PLAN OF CARE
Problem: Adult Inpatient Plan of Care  Goal: Plan of Care Review  Outcome: Ongoing, Progressing  Goal: Patient-Specific Goal (Individualized)  Outcome: Ongoing, Progressing  Goal: Absence of Hospital-Acquired Illness or Injury  Outcome: Ongoing, Progressing  Goal: Optimal Comfort and Wellbeing  Outcome: Ongoing, Progressing  Goal: Readiness for Transition of Care  Outcome: Ongoing, Progressing     Problem: Fluid Imbalance (Pneumonia)  Goal: Fluid Balance  Outcome: Ongoing, Progressing     Problem: Infection (Pneumonia)  Goal: Resolution of Infection Signs and Symptoms  Outcome: Ongoing, Progressing     Problem: Respiratory Compromise (Pneumonia)  Goal: Effective Oxygenation and Ventilation  Outcome: Ongoing, Progressing     Problem: Fall Injury Risk  Goal: Absence of Fall and Fall-Related Injury  Outcome: Ongoing, Progressing     Problem: Thought Process Alteration  Goal: Optimal Thought Clarity  Outcome: Ongoing, Progressing   No falls, safety maintained. Patient more alert today. Attempts at weaning O2 in progress, now at 9L O2 nc, sats are 95%. BM today. S/p thoracentesis today, specimens were sent.

## 2022-12-20 NOTE — SUBJECTIVE & OBJECTIVE
YASMEEN. Denies CP.   Eating okay. UOP wnl. BM wnl.       Review of Systems   Constitutional: Negative.    HENT: Negative.     Eyes: Negative.    Respiratory:  Positive for cough and shortness of breath.    Cardiovascular: Negative.    Gastrointestinal: Negative.    Endocrine: Negative.    Genitourinary: Negative.    Musculoskeletal: Negative.    Skin: Negative.    Allergic/Immunologic: Negative.    Neurological: Negative.    Psychiatric/Behavioral:  Positive for confusion.        Objective:     Vital Signs (Most Recent):  Temp: 98.4 °F (36.9 °C) (12/20/22 0752)  Pulse: 101 (12/20/22 0752)  Resp: 18 (12/20/22 0752)  BP: 125/78 (12/20/22 0752)  SpO2: (!) 92 % (12/20/22 0752)   Vital Signs (24h Range):  Temp:  [97.6 °F (36.4 °C)-98.4 °F (36.9 °C)] 98.4 °F (36.9 °C)  Pulse:  [] 101  Resp:  [18-22] 18  SpO2:  [92 %-100 %] 92 %  BP: (102-155)/(61-83) 125/78     Weight: 87.5 kg (192 lb 14.4 oz)  Body mass index is 27.68 kg/m².    Physical Exam  Vitals and nursing note reviewed.   Constitutional:       General: He is not in acute distress.     Appearance: Normal appearance. He is not ill-appearing.   HENT:      Head: Normocephalic and atraumatic.      Nose: Nose normal.      Mouth/Throat:      Mouth: Mucous membranes are moist.   Eyes:      Extraocular Movements: Extraocular movements intact.   Cardiovascular:      Rate and Rhythm: Normal rate.      Pulses: Normal pulses.      Heart sounds: No murmur heard.  Pulmonary:      Effort: Respiratory distress present.      Comments: Diffusely rhodochrous with faint expiratory wheezes.   Abdominal:      General: Abdomen is flat.      Palpations: Abdomen is soft.      Tenderness: There is no abdominal tenderness.   Musculoskeletal:      Right lower leg: No edema.      Left lower leg: No edema.   Skin:     General: Skin is warm.      Capillary Refill: Capillary refill takes less than 2 seconds.   Neurological:      General: No focal deficit present.      Mental Status: He is  alert. He is disoriented.      Comments: AOx2   Psychiatric:         Mood and Affect: Mood normal.         Cognition and Memory: Cognition is impaired. Memory is impaired.               Recent Results (from the past 24 hour(s))   POCT glucose    Collection Time: 12/19/22  2:17 PM   Result Value Ref Range    POCT Glucose 111 (H) 70 - 110 mg/dL   Phosphorus    Collection Time: 12/20/22  4:57 AM   Result Value Ref Range    Phosphorus 3.0 2.7 - 4.5 mg/dL   Magnesium    Collection Time: 12/20/22  4:57 AM   Result Value Ref Range    Magnesium 1.6 1.6 - 2.6 mg/dL   Comprehensive Metabolic Panel    Collection Time: 12/20/22  4:57 AM   Result Value Ref Range    Sodium 139 136 - 145 mmol/L    Potassium 3.6 3.5 - 5.1 mmol/L    Chloride 101 95 - 110 mmol/L    CO2 25 23 - 29 mmol/L    Glucose 139 (H) 70 - 110 mg/dL    BUN 16 8 - 23 mg/dL    Creatinine 1.0 0.5 - 1.4 mg/dL    Calcium 8.7 8.7 - 10.5 mg/dL    Total Protein 6.4 6.0 - 8.4 g/dL    Albumin 2.8 (L) 3.5 - 5.2 g/dL    Total Bilirubin 0.4 0.1 - 1.0 mg/dL    Alkaline Phosphatase 107 55 - 135 U/L    AST 23 10 - 40 U/L    ALT 22 10 - 44 U/L    Anion Gap 13 8 - 16 mmol/L    eGFR >60 >60 mL/min/1.73 m^2   CBC Auto Differential    Collection Time: 12/20/22  4:57 AM   Result Value Ref Range    WBC 8.23 3.90 - 12.70 K/uL    RBC 3.79 (L) 4.60 - 6.20 M/uL    Hemoglobin 11.5 (L) 14.0 - 18.0 g/dL    Hematocrit 33.5 (L) 40.0 - 54.0 %    MCV 88 82 - 98 fL    MCH 30.3 27.0 - 31.0 pg    MCHC 34.3 32.0 - 36.0 g/dL    RDW 14.9 (H) 11.5 - 14.5 %    Platelets 305 150 - 450 K/uL    MPV 9.4 9.2 - 12.9 fL    Immature Granulocytes 2.6 (H) 0.0 - 0.5 %    Gran # (ANC) 5.3 1.8 - 7.7 K/uL    Immature Grans (Abs) 0.21 (H) 0.00 - 0.04 K/uL    Lymph # 1.5 1.0 - 4.8 K/uL    Mono # 1.1 (H) 0.3 - 1.0 K/uL    Eos # 0.0 0.0 - 0.5 K/uL    Baso # 0.06 0.00 - 0.20 K/uL    nRBC 1 (A) 0 /100 WBC    Gran % 64.7 38.0 - 73.0 %    Lymph % 18.5 18.0 - 48.0 %    Mono % 13.1 4.0 - 15.0 %    Eosinophil % 0.4 0.0 - 8.0 %     Basophil % 0.7 0.0 - 1.9 %    Differential Method Automated    VANCOMYCIN, TROUGH    Collection Time: 12/20/22  4:57 AM   Result Value Ref Range    Vancomycin-Trough 22.1 (H) 10.0 - 22.0 ug/mL       Microbiology Results (last 7 days)       Procedure Component Value Units Date/Time    Blood culture #2 **CANNOT BE ORDERED STAT** [[id number]] Collected: 12/16/22 0224    Order Status: Completed Specimen: Blood Updated: 12/20/22 0303     Blood Culture, Routine No Growth after 4 days.    Blood culture #1 **CANNOT BE ORDERED STAT** [[id number]] Collected: 12/16/22 0224    Order Status: Completed Specimen: Blood Updated: 12/20/22 0303     Blood Culture, Routine No Growth after 4 days.    Gram stain [[id number]] Collected: 12/19/22 0759    Order Status: Completed Specimen: Pleural Fluid from Lung, Right Updated: 12/19/22 0843     Gram Stain Result Cytospin indicates:      Many WBC's      No organisms seen    Aerobic culture [[id number]] Collected: 12/19/22 0759    Order Status: Sent Specimen: Pleural Fluid from Lung, Right Updated: 12/19/22 0817    Culture, Anaerobic [[id number]] Collected: 12/19/22 0759    Order Status: Sent Specimen: Pleural Fluid from Lung, Right Updated: 12/19/22 0815    Culture, Respiratory with Gram Stain [[id number]] Collected: 12/16/22 0706    Order Status: Completed Specimen: Sputum Updated: 12/18/22 0548     Respiratory Culture Normal respiratory maksim     Gram Stain (Respiratory) <10 epithelial cells per low power field.     Gram Stain (Respiratory) Few WBC's     Gram Stain (Respiratory) Moderate Gram positive cocci in pairs and clusters             Imaging Results               CTA Chest Non-Coronary (PE Studies) (Final result)  Result time 12/16/22 03:55:50      Final result by Jordin Sofia MD (12/16/22 03:55:50)                   Impression:      There is no large central saddle type pulmonary embolus, when accounting for limitations of the examination there is no additional evidence for  pulmonary embolus on this exam.    Persistent abnormal appearance of the left upper lobe with suspected occlusion of the left upper lobe bronchus, and volume loss of the left upper lobe however there is progression of abnormality, with increased pattern of interstitial and alveolar opacity, which may relate to superimposed interstitial and alveolar infiltrates/airspace disease and consolidative change and volume loss.    The lungs bilaterally demonstrate appearance of increased interstitial and ground-glass opacity with additional areas of confluent infiltrates/airspace disease having developed at the left lower lobe inferiorly.    Previously identified opacity that may relate to chronic and consolidative change of the left upper lobe posterior medially appears similar to the prior study.    Mild opacity within the trachea layering posteriorly superior to the jonnathan may relate to secretions or aspiration.  Bilateral peribronchial thickening is also noted.    Moderate to large right pleural effusion, small left pleural effusion.  Small pericardial effusion.    Left supraclavicular lesion again noted, not well evaluated on this examination as discussed above, similarly the previously identified left upper lobe nodule is obscured due to the findings of the left upper lobe at this time.    Additional findings as above.    This report was flagged in [employer] as abnormal.      Electronically signed by: Jordin Sofia  Date:    12/16/2022  Time:    03:55               Narrative:    EXAMINATION:  CTA CHEST NON CORONARY (PE STUDIES)    CLINICAL HISTORY:  Pulmonary embolism (PE) suspected, positive D-dimer;    TECHNIQUE:  Low dose axial images, sagittal and coronal reformations were obtained from the thoracic inlet to the lung bases following the IV administration of 75 mL of Omnipaque 350.  Contrast timing was optimized to evaluate the pulmonary arteries.  MIP images were performed.    COMPARISON:  Prior examinations, most  recent of which is CT examination of the chest November 18, 2022    FINDINGS:  There is no large central saddle type pulmonary embolus.  When accounting for artifact the pulmonary arterial vascular demonstrate opacification without evidence for abnormal pattern of pulmonary arterial filling defects specific for pulmonary emboli.    There is a right-sided central venous catheter noted, the distal tip extends to the SVC.  Better demonstrated on the prior examination there is a supraclavicular/retroclavicular soft tissue lesion, consistent with a soft tissue mass or enlarged adenopathy, this is difficult to accurately measure on this exam, as the examination was optimized for evaluation of the pulmonary arterial vasculature rather than soft tissues.    There is a moderate to large pleural effusion on the right, and a small pleural effusion on the left, this is seen as an interval change.  There is also a small pericardial effusion.  The thoracic aorta demonstrates appropriate opacification.  Atherosclerotic change noted.    The lungs demonstrate chronic change with emphysematous change noted.  The right hemithorax demonstrates appearance of interstitial infiltrate/edema and ground-glass infiltrate throughout the right hemithorax, there is atelectasis noted, most notably at the right lower lobe, associated with the aforementioned pleural effusion.    As previously noted there is appearance thought to represent occlusion of the left upper lobe bronchus.  There is volume loss of the left upper lobe, there is increased pattern of interstitial as well as alveolar opacity, prominent confluent opacity may relate to components of consolidation, and volume loss.  The previously identified nodule at the left upper lobe medially is obscured by the aforementioned.  The left lower lobe demonstrates interstitial edema/infiltrate, as well as a diffuse pattern of ground-glass infiltrate.  There is an area of superimposed opacity  consistent with consolidative change posteriorly medially appearing similar to the prior examination.  There is additional appearance of confluent infiltrates/airspace disease at the left lung base.    There is mild opacity layering posteriorly within the trachea just above the level of the jonnathan this may relate to secretions or aspiration.  There is peribronchial thickening bilaterally most notably at the lung bases.  There is no evidence for pneumothorax.    Limited imaging of the upper abdomen demonstrates no evidence for acute upper abdominal process.  The visualized osseous structures demonstrate chronic change.                                       X-Ray Chest 1 View (Final result)  Result time 12/16/22 01:02:44      Final result by Luis Enrique Schroeder MD (12/16/22 01:02:44)                   Impression:      Stable pleural and parenchymal opacities and Port-A-Cath with no acute findings radiographically.      Electronically signed by: Luis Enrique Schroeder  Date:    12/16/2022  Time:    01:02               Narrative:    EXAMINATION:  XR CHEST 1 VIEW    CLINICAL HISTORY:  shortness of breath;    TECHNIQUE:  Single frontal view of the chest was performed.    COMPARISON:  11/03/2002    FINDINGS:  Pleural and parenchymal opacities do not appear significantly changed.  Port-A-Cath remains in position.  Bones remain intact.  The heart mediastinal contours are stable.

## 2022-12-20 NOTE — PROGRESS NOTES
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     Patient reports developing worsening shortness of breath and productive cough since he took his last chemotherapy dose on 12/13. He is on 2L O2 at home, and SpO2 readings were as low as 80%, and would not improved despite increasing the O2 to 4L. He has multiple admissions for similar presentation. No longer a smoker. In the ED, the patient was tachycardic, tachypnic and hypoxic requiring 12L O2. Labs showed leukocytosis (16.9), elevated d-dimer (1.94), hypokalemia (3.4), elevated BNP (355), elevated troponin (0.089), elevated lactic acid (2.3). CTA showed no PE, presistent and worsening occlusion of left upper lobe bronchus with superimposed interstitial and alveolar infiltrates., increased ground glass opacities, moderate to large right pleural effusion, small left pleural effusion and other chronic stable changes.  . He was given fluids, vancomycin, zosyn, and DuoNeb x1. The patient was admitted for further management.       Overview/Hospital Course:  Lung Cancer with superimposed Pneumonia. Staph-like organisms growing in sputum, continue broad coverage until results.  Pulmonology consulted, appreciate assessment, will ask IR to tap the new pleural effusion for distinguish meant between malignant effusion, cardiac/HF effusion, or infection- thoracentesis labs  ordered. Multiple hospital admissions the last few months, poor medical understanding. Palliative care consult placed for 12/19. Thoracentesis labs sent 12/19, awaiting results. Apixaban and Plavix received early 12/18 morning, held for thoracentesis thereafter, restart after completed. Labs trended to normal. O2 requirement reduced from 12L to 8L.       NAEON. Denies CP.   Eating okay. UOP wnl. BM wnl.   Mentation improved.    Review of Systems   Constitutional: Negative.    HENT: Negative.     Eyes: Negative.    Respiratory:  Positive for cough and shortness of breath.    Cardiovascular: Negative.    Gastrointestinal: Negative.    Endocrine: Negative.    Genitourinary: Negative.    Musculoskeletal: Negative.    Skin: Negative.    Allergic/Immunologic: Negative.    Neurological: Negative.    Psychiatric/Behavioral:  Positive for confusion.        Objective:     Vital Signs (Most Recent):  Temp: 98.4 °F (36.9 °C) (12/20/22 0752)  Pulse: 101 (12/20/22 0752)  Resp: 18 (12/20/22 0752)  BP: 125/78 (12/20/22 0752)  SpO2: (!) 92 % (12/20/22 0752)   Vital Signs (24h Range):  Temp:  [97.6 °F (36.4 °C)-98.4 °F (36.9 °C)] 98.4 °F (36.9 °C)  Pulse:  [] 101  Resp:  [18-22] 18  SpO2:  [92 %-100 %] 92 %  BP: (102-155)/(61-83) 125/78     Weight: 87.5 kg (192 lb 14.4 oz)  Body mass index is 27.68 kg/m².    Physical Exam  Vitals and nursing note reviewed.   Constitutional:       General: He is not in acute distress.     Appearance: Normal appearance. He is not ill-appearing.   HENT:      Head: Normocephalic and atraumatic.      Nose: Nose normal.      Mouth/Throat:      Mouth: Mucous membranes are moist.   Eyes:      Extraocular Movements: Extraocular movements intact.   Cardiovascular:      Rate and Rhythm: Normal rate.      Pulses: Normal pulses.      Heart sounds: No murmur heard.  Pulmonary:      Effort: Respiratory distress present.      Comments: Diffusely rhodochrous with faint expiratory wheezes.   Abdominal:       General: Abdomen is flat.      Palpations: Abdomen is soft.      Tenderness: There is no abdominal tenderness.   Musculoskeletal:      Right lower leg: No edema.      Left lower leg: No edema.   Skin:     General: Skin is warm.      Capillary Refill: Capillary refill takes less than 2 seconds.   Neurological:      General: No focal deficit present.      Mental Status: He is alert. He is disoriented.      Comments: AOx2   Psychiatric:         Mood and Affect: Mood normal.         Cognition and Memory: Cognition is impaired. Memory is impaired.               Recent Results (from the past 24 hour(s))   POCT glucose    Collection Time: 12/19/22  2:17 PM   Result Value Ref Range    POCT Glucose 111 (H) 70 - 110 mg/dL   Phosphorus    Collection Time: 12/20/22  4:57 AM   Result Value Ref Range    Phosphorus 3.0 2.7 - 4.5 mg/dL   Magnesium    Collection Time: 12/20/22  4:57 AM   Result Value Ref Range    Magnesium 1.6 1.6 - 2.6 mg/dL   Comprehensive Metabolic Panel    Collection Time: 12/20/22  4:57 AM   Result Value Ref Range    Sodium 139 136 - 145 mmol/L    Potassium 3.6 3.5 - 5.1 mmol/L    Chloride 101 95 - 110 mmol/L    CO2 25 23 - 29 mmol/L    Glucose 139 (H) 70 - 110 mg/dL    BUN 16 8 - 23 mg/dL    Creatinine 1.0 0.5 - 1.4 mg/dL    Calcium 8.7 8.7 - 10.5 mg/dL    Total Protein 6.4 6.0 - 8.4 g/dL    Albumin 2.8 (L) 3.5 - 5.2 g/dL    Total Bilirubin 0.4 0.1 - 1.0 mg/dL    Alkaline Phosphatase 107 55 - 135 U/L    AST 23 10 - 40 U/L    ALT 22 10 - 44 U/L    Anion Gap 13 8 - 16 mmol/L    eGFR >60 >60 mL/min/1.73 m^2   CBC Auto Differential    Collection Time: 12/20/22  4:57 AM   Result Value Ref Range    WBC 8.23 3.90 - 12.70 K/uL    RBC 3.79 (L) 4.60 - 6.20 M/uL    Hemoglobin 11.5 (L) 14.0 - 18.0 g/dL    Hematocrit 33.5 (L) 40.0 - 54.0 %    MCV 88 82 - 98 fL    MCH 30.3 27.0 - 31.0 pg    MCHC 34.3 32.0 - 36.0 g/dL    RDW 14.9 (H) 11.5 - 14.5 %    Platelets 305 150 - 450 K/uL    MPV 9.4 9.2 - 12.9 fL    Immature  Granulocytes 2.6 (H) 0.0 - 0.5 %    Gran # (ANC) 5.3 1.8 - 7.7 K/uL    Immature Grans (Abs) 0.21 (H) 0.00 - 0.04 K/uL    Lymph # 1.5 1.0 - 4.8 K/uL    Mono # 1.1 (H) 0.3 - 1.0 K/uL    Eos # 0.0 0.0 - 0.5 K/uL    Baso # 0.06 0.00 - 0.20 K/uL    nRBC 1 (A) 0 /100 WBC    Gran % 64.7 38.0 - 73.0 %    Lymph % 18.5 18.0 - 48.0 %    Mono % 13.1 4.0 - 15.0 %    Eosinophil % 0.4 0.0 - 8.0 %    Basophil % 0.7 0.0 - 1.9 %    Differential Method Automated    VANCOMYCIN, TROUGH    Collection Time: 12/20/22  4:57 AM   Result Value Ref Range    Vancomycin-Trough 22.1 (H) 10.0 - 22.0 ug/mL       Microbiology Results (last 7 days)       Procedure Component Value Units Date/Time    Blood culture #2 **CANNOT BE ORDERED STAT** [[id number]] Collected: 12/16/22 0224    Order Status: Completed Specimen: Blood Updated: 12/20/22 0303     Blood Culture, Routine No Growth after 4 days.    Blood culture #1 **CANNOT BE ORDERED STAT** [[id number]] Collected: 12/16/22 0224    Order Status: Completed Specimen: Blood Updated: 12/20/22 0303     Blood Culture, Routine No Growth after 4 days.    Gram stain [[id number]] Collected: 12/19/22 0759    Order Status: Completed Specimen: Pleural Fluid from Lung, Right Updated: 12/19/22 0843     Gram Stain Result Cytospin indicates:      Many WBC's      No organisms seen    Aerobic culture [[id number]] Collected: 12/19/22 0759    Order Status: Sent Specimen: Pleural Fluid from Lung, Right Updated: 12/19/22 0817    Culture, Anaerobic [[id number]] Collected: 12/19/22 0759    Order Status: Sent Specimen: Pleural Fluid from Lung, Right Updated: 12/19/22 0815    Culture, Respiratory with Gram Stain [[id number]] Collected: 12/16/22 0706    Order Status: Completed Specimen: Sputum Updated: 12/18/22 0548     Respiratory Culture Normal respiratory maksim     Gram Stain (Respiratory) <10 epithelial cells per low power field.     Gram Stain (Respiratory) Few WBC's     Gram Stain (Respiratory) Moderate Gram positive cocci in  pairs and clusters             Imaging Results               CTA Chest Non-Coronary (PE Studies) (Final result)  Result time 12/16/22 03:55:50      Final result by Jordin Sofia MD (12/16/22 03:55:50)                   Impression:      There is no large central saddle type pulmonary embolus, when accounting for limitations of the examination there is no additional evidence for pulmonary embolus on this exam.    Persistent abnormal appearance of the left upper lobe with suspected occlusion of the left upper lobe bronchus, and volume loss of the left upper lobe however there is progression of abnormality, with increased pattern of interstitial and alveolar opacity, which may relate to superimposed interstitial and alveolar infiltrates/airspace disease and consolidative change and volume loss.    The lungs bilaterally demonstrate appearance of increased interstitial and ground-glass opacity with additional areas of confluent infiltrates/airspace disease having developed at the left lower lobe inferiorly.    Previously identified opacity that may relate to chronic and consolidative change of the left upper lobe posterior medially appears similar to the prior study.    Mild opacity within the trachea layering posteriorly superior to the jonnathan may relate to secretions or aspiration.  Bilateral peribronchial thickening is also noted.    Moderate to large right pleural effusion, small left pleural effusion.  Small pericardial effusion.    Left supraclavicular lesion again noted, not well evaluated on this examination as discussed above, similarly the previously identified left upper lobe nodule is obscured due to the findings of the left upper lobe at this time.    Additional findings as above.    This report was flagged in Epic as abnormal.      Electronically signed by: Jordin Sofia  Date:    12/16/2022  Time:    03:55               Narrative:    EXAMINATION:  CTA CHEST NON CORONARY (PE STUDIES)    CLINICAL  HISTORY:  Pulmonary embolism (PE) suspected, positive D-dimer;    TECHNIQUE:  Low dose axial images, sagittal and coronal reformations were obtained from the thoracic inlet to the lung bases following the IV administration of 75 mL of Omnipaque 350.  Contrast timing was optimized to evaluate the pulmonary arteries.  MIP images were performed.    COMPARISON:  Prior examinations, most recent of which is CT examination of the chest November 18, 2022    FINDINGS:  There is no large central saddle type pulmonary embolus.  When accounting for artifact the pulmonary arterial vascular demonstrate opacification without evidence for abnormal pattern of pulmonary arterial filling defects specific for pulmonary emboli.    There is a right-sided central venous catheter noted, the distal tip extends to the SVC.  Better demonstrated on the prior examination there is a supraclavicular/retroclavicular soft tissue lesion, consistent with a soft tissue mass or enlarged adenopathy, this is difficult to accurately measure on this exam, as the examination was optimized for evaluation of the pulmonary arterial vasculature rather than soft tissues.    There is a moderate to large pleural effusion on the right, and a small pleural effusion on the left, this is seen as an interval change.  There is also a small pericardial effusion.  The thoracic aorta demonstrates appropriate opacification.  Atherosclerotic change noted.    The lungs demonstrate chronic change with emphysematous change noted.  The right hemithorax demonstrates appearance of interstitial infiltrate/edema and ground-glass infiltrate throughout the right hemithorax, there is atelectasis noted, most notably at the right lower lobe, associated with the aforementioned pleural effusion.    As previously noted there is appearance thought to represent occlusion of the left upper lobe bronchus.  There is volume loss of the left upper lobe, there is increased pattern of interstitial  as well as alveolar opacity, prominent confluent opacity may relate to components of consolidation, and volume loss.  The previously identified nodule at the left upper lobe medially is obscured by the aforementioned.  The left lower lobe demonstrates interstitial edema/infiltrate, as well as a diffuse pattern of ground-glass infiltrate.  There is an area of superimposed opacity consistent with consolidative change posteriorly medially appearing similar to the prior examination.  There is additional appearance of confluent infiltrates/airspace disease at the left lung base.    There is mild opacity layering posteriorly within the trachea just above the level of the jonnathan this may relate to secretions or aspiration.  There is peribronchial thickening bilaterally most notably at the lung bases.  There is no evidence for pneumothorax.    Limited imaging of the upper abdomen demonstrates no evidence for acute upper abdominal process.  The visualized osseous structures demonstrate chronic change.                                       X-Ray Chest 1 View (Final result)  Result time 12/16/22 01:02:44      Final result by Luis Enrique Schroeder MD (12/16/22 01:02:44)                   Impression:      Stable pleural and parenchymal opacities and Port-A-Cath with no acute findings radiographically.      Electronically signed by: Luis Enrique Schroeder  Date:    12/16/2022  Time:    01:02               Narrative:    EXAMINATION:  XR CHEST 1 VIEW    CLINICAL HISTORY:  shortness of breath;    TECHNIQUE:  Single frontal view of the chest was performed.    COMPARISON:  11/03/2002    FINDINGS:  Pleural and parenchymal opacities do not appear significantly changed.  Port-A-Cath remains in position.  Bones remain intact.  The heart mediastinal contours are stable.                                            Assessment/Plan:      * Acute on chronic respiratory failure with hypoxia  Patient with Hypoxic Respiratory failure which is Acute on  chronic.  he is on home oxygen at 2 LPM. Supplemental oxygen was provided and noted- Oxygen Concentration (%):  [100] 100.   Signs/symptoms of respiratory failure include- increased work of breathing and respiratory distress. Contributing diagnoses includes - COPD and Pneumonia Labs and images were reviewed. Patient Has recent ABG, which has been reviewed. Will treat underlying causes and adjust management of respiratory failure as follows- see plans for HAP & COPD exacerbation   Oxygen reduced from 12L to 8L 12/19    Pleural effusion  Pulmonology consulted, appreciate assessment:    Present on chest CTA from 12/17 but not present on most recent CT from a month before.  History of diastolic CHF, as well as lung cancer with metastases to right-sided ribs.   Given the timeframe of CT changes, I suspect this is likely due to CHF (probably diastolic) rather than infection or cancer.  However, given known cancer and immunosuppression from chemotherapy, I think that right thoracentesis is appropriate when anticoagulation can be safely held.    Cardiac/HF effusion vs Infx/parapneumonic effusion vs malignant effusion  Will ask IR to tap the new pleural effusion for distinguish   Thoracentesis labs ordered.   Multiple hospital admissions the last few months, poor medical understanding- Palliative care consult placed for 12/19.  Thoracentesis labs sent 12/19, awaiting results.   WBC# has normalized for first time 12/19/22    Persistent atrial fibrillation  [id number] Score: 1.  Resume home medications   AC held for thora, restart 12/20    Chronic obstructive pulmonary disease with acute exacerbation  - History of COPD on supplemental O2, 2L NC at home   - Presented with worsening SOB   - Likely related to a COPD exacerbation in the setting of pneumonia      Plan:   - Continue steroids.  - Duo-Nebs scheduled  - Wean O2 as tolerated    Coronary artery disease involving native coronary artery of native heart without angina  pectoris  Denies chest pain  Resume aspirin, plavix, and statin      PAD (peripheral artery disease)  Continue home medications       Chronic diastolic heart failure  -Echocardiogram (2/22/2022): EF: 35%-40%, GIDD, normal RV systolic function, mild TR   -Echocardiogram (5/18/2022): EF: 65%, indeterminate LV diastolic function, mild TR, PA pressure of 54 mmHg  -Echocardiogram (9/30/2022): EF: 60%, mildly reduced RV systolic function, PA pressure of 40 mmHg.   -Elevated BNP, around baseline   -Doubt exacerbation   -Resume home Lasix       Essential hypertension  Resume home medications       Lung cancer, main bronchus, left  On maintenance chemotherapy and radiation   Outpatient follow up with palliative care/oncology       HAP (hospital-acquired pneumonia)  Presented with SOB, cough   CTA showed no PE, presistent and worsening occlusion of left upper lobe bronchus with superimposed interstitial and alveolar infiltrates., increased ground glass opacities, moderate to large right pleural effusion, small left pleural effusion and other chronic stable change.   Concern for HAP/post obstructive pneumonia    Plan:   - Vancomycin, zosyn and doxycyline   - Respiratory cultures   - Wean O2 as tolerated     VTE Risk Mitigation (From admission, onward)           Ordered     apixaban tablet 5 mg  2 times daily         12/19/22 0832     IP VTE HIGH RISK PATIENT  Once         12/16/22 0435     Place sequential compression device  Until discontinued         12/16/22 0435                    Discharge Planning   AZ: 12/19/2022     Code Status: Full Code   Is the patient medically ready for discharge?:     Reason for patient still in hospital (select all that apply): Patient trending condition and Treatment  Discharge Plan A: Home with family   Discharge Delays: None known at this time              Neel Dupree MD  Department of Hospital Medicine   [facility] [place] - Telemetry

## 2022-12-21 NOTE — ASSESSMENT & PLAN NOTE
Pulmonology consulted, appreciate assessment:    Present on chest CTA from 12/17 but not present on most recent CT from a month before.  History of diastolic CHF, as well as lung cancer with metastases to right-sided ribs.   Given the timeframe of CT changes, I suspect this is likely due to CHF (probably diastolic) rather than infection or cancer.  However, given known cancer and immunosuppression from chemotherapy, I think that right thoracentesis is appropriate when anticoagulation can be safely held.    · Cardiac/HF effusion vs Infx/parapneumonic effusion vs malignant effusion  · Will ask IR to tap the new pleural effusion for distinguish   · Thoracentesis labs ordered.   · Multiple hospital admissions the last few months, poor medical understanding- Palliative care consult placed for 12/19.  · Thoracentesis labs sent 12/19, awaiting results.   · WBC# has normalized for first time 12/19/22  · Vanc/Ceft/Doxy stopped, will give 3 more doses of oral levoquin.

## 2022-12-21 NOTE — PLAN OF CARE
Problem: Adult Inpatient Plan of Care  Goal: Plan of Care Review  Outcome: Ongoing, Progressing  Goal: Patient-Specific Goal (Individualized)  Outcome: Ongoing, Progressing  Goal: Absence of Hospital-Acquired Illness or Injury  Outcome: Ongoing, Progressing  Goal: Optimal Comfort and Wellbeing  Outcome: Ongoing, Progressing  Goal: Readiness for Transition of Care  Outcome: Ongoing, Progressing     Problem: Fluid Imbalance (Pneumonia)  Goal: Fluid Balance  Outcome: Ongoing, Progressing     Problem: Infection (Pneumonia)  Goal: Resolution of Infection Signs and Symptoms  Outcome: Ongoing, Progressing     Problem: Skin Injury Risk Increased  Goal: Skin Health and Integrity  Outcome: Ongoing, Progressing     Problem: Thought Process Alteration  Goal: Optimal Thought Clarity  Outcome: Ongoing, Progressing   No falls, safety maintained. O2 down to 6L O2 nc, sats 93%. More disorientation today- thought we were at a gas station. No c/o pain. VSS, NSR to ST on tele.

## 2022-12-21 NOTE — SUBJECTIVE & OBJECTIVE
YASMEEN. Denies CP.   Eating okay. UOP wnl. BM wnl.       Review of Systems   Constitutional: Negative.    HENT: Negative.     Eyes: Negative.    Respiratory:  Positive for cough and shortness of breath.    Cardiovascular: Negative.    Gastrointestinal: Negative.    Endocrine: Negative.    Genitourinary: Negative.    Musculoskeletal: Negative.    Skin: Negative.    Allergic/Immunologic: Negative.    Neurological: Negative.    Psychiatric/Behavioral:  Positive for confusion.        Objective:     Vital Signs (Most Recent):  Temp: 97.9 °F (36.6 °C) (12/21/22 0437)  Pulse: 71 (12/21/22 0437)  Resp: 18 (12/21/22 0437)  BP: 120/73 (12/21/22 0437)  SpO2: (!) 93 % (12/21/22 0437)   Vital Signs (24h Range):  Temp:  [97.8 °F (36.6 °C)-98.6 °F (37 °C)] 97.9 °F (36.6 °C)  Pulse:  [] 71  Resp:  [18-22] 18  SpO2:  [91 %-96 %] 93 %  BP: (112-125)/(69-78) 120/73     Weight: 87.5 kg (192 lb 14.4 oz)  Body mass index is 27.68 kg/m².    Physical Exam  Vitals and nursing note reviewed.   Constitutional:       General: He is not in acute distress.     Appearance: Normal appearance. He is not ill-appearing.   HENT:      Head: Normocephalic and atraumatic.      Nose: Nose normal.      Mouth/Throat:      Mouth: Mucous membranes are moist.   Eyes:      Extraocular Movements: Extraocular movements intact.   Cardiovascular:      Rate and Rhythm: Normal rate.      Pulses: Normal pulses.      Heart sounds: No murmur heard.  Pulmonary:      Effort: Respiratory distress present.      Comments: Diffusely rhodochrous with faint expiratory wheezes.   Abdominal:      General: Abdomen is flat.      Palpations: Abdomen is soft.      Tenderness: There is no abdominal tenderness.   Musculoskeletal:      Right lower leg: No edema.      Left lower leg: No edema.   Skin:     General: Skin is warm.      Capillary Refill: Capillary refill takes less than 2 seconds.   Neurological:      General: No focal deficit present.      Mental Status: He is alert.  He is disoriented.      Comments: AOx2   Psychiatric:         Mood and Affect: Mood normal.         Cognition and Memory: Cognition is impaired. Memory is impaired.               Recent Results (from the past 24 hour(s))   Vancomycin, random    Collection Time: 12/20/22  5:32 PM   Result Value Ref Range    Vancomycin, Random 13.2 Not established ug/mL   Phosphorus    Collection Time: 12/21/22  5:27 AM   Result Value Ref Range    Phosphorus 3.9 2.7 - 4.5 mg/dL   Magnesium    Collection Time: 12/21/22  5:27 AM   Result Value Ref Range    Magnesium 2.1 1.6 - 2.6 mg/dL   Comprehensive Metabolic Panel    Collection Time: 12/21/22  5:27 AM   Result Value Ref Range    Sodium 139 136 - 145 mmol/L    Potassium 4.6 3.5 - 5.1 mmol/L    Chloride 106 95 - 110 mmol/L    CO2 24 23 - 29 mmol/L    Glucose 104 70 - 110 mg/dL    BUN 11 8 - 23 mg/dL    Creatinine 0.9 0.5 - 1.4 mg/dL    Calcium 8.6 (L) 8.7 - 10.5 mg/dL    Total Protein 5.8 (L) 6.0 - 8.4 g/dL    Albumin 2.6 (L) 3.5 - 5.2 g/dL    Total Bilirubin 0.5 0.1 - 1.0 mg/dL    Alkaline Phosphatase 87 55 - 135 U/L    AST 18 10 - 40 U/L    ALT 19 10 - 44 U/L    Anion Gap 9 8 - 16 mmol/L    eGFR >60 >60 mL/min/1.73 m^2   CBC Auto Differential    Collection Time: 12/21/22  5:27 AM   Result Value Ref Range    WBC 8.63 3.90 - 12.70 K/uL    RBC 3.60 (L) 4.60 - 6.20 M/uL    Hemoglobin 11.0 (L) 14.0 - 18.0 g/dL    Hematocrit 31.4 (L) 40.0 - 54.0 %    MCV 87 82 - 98 fL    MCH 30.6 27.0 - 31.0 pg    MCHC 35.0 32.0 - 36.0 g/dL    RDW 15.2 (H) 11.5 - 14.5 %    Platelets 254 150 - 450 K/uL    MPV 10.1 9.2 - 12.9 fL    Immature Granulocytes 4.8 (H) 0.0 - 0.5 %    Gran # (ANC) 5.2 1.8 - 7.7 K/uL    Immature Grans (Abs) 0.41 (H) 0.00 - 0.04 K/uL    Lymph # 1.7 1.0 - 4.8 K/uL    Mono # 1.3 (H) 0.3 - 1.0 K/uL    Eos # 0.0 0.0 - 0.5 K/uL    Baso # 0.10 0.00 - 0.20 K/uL    nRBC 0 0 /100 WBC    Gran % 59.9 38.0 - 73.0 %    Lymph % 19.1 18.0 - 48.0 %    Mono % 14.5 4.0 - 15.0 %    Eosinophil % 0.5 0.0 -  8.0 %    Basophil % 1.2 0.0 - 1.9 %    Differential Method Automated        Microbiology Results (last 7 days)       Procedure Component Value Units Date/Time    Aerobic culture [[id number]] Collected: 12/19/22 0759    Order Status: Completed Specimen: Pleural Fluid from Lung, Right Updated: 12/20/22 1102     Aerobic Bacterial Culture No growth    Blood culture #2 **CANNOT BE ORDERED STAT** [[id number]] Collected: 12/16/22 0224    Order Status: Completed Specimen: Blood Updated: 12/20/22 0303     Blood Culture, Routine No Growth after 4 days.    Blood culture #1 **CANNOT BE ORDERED STAT** [[id number]] Collected: 12/16/22 0224    Order Status: Completed Specimen: Blood Updated: 12/20/22 0303     Blood Culture, Routine No Growth after 4 days.    Gram stain [[id number]] Collected: 12/19/22 0759    Order Status: Completed Specimen: Pleural Fluid from Lung, Right Updated: 12/19/22 0843     Gram Stain Result Cytospin indicates:      Many WBC's      No organisms seen    Culture, Anaerobic [[id number]] Collected: 12/19/22 0759    Order Status: Sent Specimen: Pleural Fluid from Lung, Right Updated: 12/19/22 0815    Culture, Respiratory with Gram Stain [[id number]] Collected: 12/16/22 0706    Order Status: Completed Specimen: Sputum Updated: 12/18/22 0548     Respiratory Culture Normal respiratory maksim     Gram Stain (Respiratory) <10 epithelial cells per low power field.     Gram Stain (Respiratory) Few WBC's     Gram Stain (Respiratory) Moderate Gram positive cocci in pairs and clusters             Imaging Results               CTA Chest Non-Coronary (PE Studies) (Final result)  Result time 12/16/22 03:55:50      Final result by Jordin Sofia MD (12/16/22 03:55:50)                   Impression:      There is no large central saddle type pulmonary embolus, when accounting for limitations of the examination there is no additional evidence for pulmonary embolus on this exam.    Persistent abnormal appearance of the left upper  lobe with suspected occlusion of the left upper lobe bronchus, and volume loss of the left upper lobe however there is progression of abnormality, with increased pattern of interstitial and alveolar opacity, which may relate to superimposed interstitial and alveolar infiltrates/airspace disease and consolidative change and volume loss.    The lungs bilaterally demonstrate appearance of increased interstitial and ground-glass opacity with additional areas of confluent infiltrates/airspace disease having developed at the left lower lobe inferiorly.    Previously identified opacity that may relate to chronic and consolidative change of the left upper lobe posterior medially appears similar to the prior study.    Mild opacity within the trachea layering posteriorly superior to the jonnathan may relate to secretions or aspiration.  Bilateral peribronchial thickening is also noted.    Moderate to large right pleural effusion, small left pleural effusion.  Small pericardial effusion.    Left supraclavicular lesion again noted, not well evaluated on this examination as discussed above, similarly the previously identified left upper lobe nodule is obscured due to the findings of the left upper lobe at this time.    Additional findings as above.    This report was flagged in [employer] as abnormal.      Electronically signed by: Jordin Sofia  Date:    12/16/2022  Time:    03:55               Narrative:    EXAMINATION:  CTA CHEST NON CORONARY (PE STUDIES)    CLINICAL HISTORY:  Pulmonary embolism (PE) suspected, positive D-dimer;    TECHNIQUE:  Low dose axial images, sagittal and coronal reformations were obtained from the thoracic inlet to the lung bases following the IV administration of 75 mL of Omnipaque 350.  Contrast timing was optimized to evaluate the pulmonary arteries.  MIP images were performed.    COMPARISON:  Prior examinations, most recent of which is CT examination of the chest November 18, 2022    FINDINGS:  There is  no large central saddle type pulmonary embolus.  When accounting for artifact the pulmonary arterial vascular demonstrate opacification without evidence for abnormal pattern of pulmonary arterial filling defects specific for pulmonary emboli.    There is a right-sided central venous catheter noted, the distal tip extends to the SVC.  Better demonstrated on the prior examination there is a supraclavicular/retroclavicular soft tissue lesion, consistent with a soft tissue mass or enlarged adenopathy, this is difficult to accurately measure on this exam, as the examination was optimized for evaluation of the pulmonary arterial vasculature rather than soft tissues.    There is a moderate to large pleural effusion on the right, and a small pleural effusion on the left, this is seen as an interval change.  There is also a small pericardial effusion.  The thoracic aorta demonstrates appropriate opacification.  Atherosclerotic change noted.    The lungs demonstrate chronic change with emphysematous change noted.  The right hemithorax demonstrates appearance of interstitial infiltrate/edema and ground-glass infiltrate throughout the right hemithorax, there is atelectasis noted, most notably at the right lower lobe, associated with the aforementioned pleural effusion.    As previously noted there is appearance thought to represent occlusion of the left upper lobe bronchus.  There is volume loss of the left upper lobe, there is increased pattern of interstitial as well as alveolar opacity, prominent confluent opacity may relate to components of consolidation, and volume loss.  The previously identified nodule at the left upper lobe medially is obscured by the aforementioned.  The left lower lobe demonstrates interstitial edema/infiltrate, as well as a diffuse pattern of ground-glass infiltrate.  There is an area of superimposed opacity consistent with consolidative change posteriorly medially appearing similar to the prior  examination.  There is additional appearance of confluent infiltrates/airspace disease at the left lung base.    There is mild opacity layering posteriorly within the trachea just above the level of the jonnathan this may relate to secretions or aspiration.  There is peribronchial thickening bilaterally most notably at the lung bases.  There is no evidence for pneumothorax.    Limited imaging of the upper abdomen demonstrates no evidence for acute upper abdominal process.  The visualized osseous structures demonstrate chronic change.                                       X-Ray Chest 1 View (Final result)  Result time 12/16/22 01:02:44      Final result by Luis Enrique Schroeder MD (12/16/22 01:02:44)                   Impression:      Stable pleural and parenchymal opacities and Port-A-Cath with no acute findings radiographically.      Electronically signed by: Luis Enrique Schroeder  Date:    12/16/2022  Time:    01:02               Narrative:    EXAMINATION:  XR CHEST 1 VIEW    CLINICAL HISTORY:  shortness of breath;    TECHNIQUE:  Single frontal view of the chest was performed.    COMPARISON:  11/03/2002    FINDINGS:  Pleural and parenchymal opacities do not appear significantly changed.  Port-A-Cath remains in position.  Bones remain intact.  The heart mediastinal contours are stable.

## 2022-12-21 NOTE — PROGRESS NOTES
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     Patient reports developing worsening shortness of breath and productive cough since he took his last chemotherapy dose on 12/13. He is on 2L O2 at home, and SpO2 readings were as low as 80%, and would not improved despite increasing the O2 to 4L. He has multiple admissions for similar presentation. No longer a smoker. In the ED, the patient was tachycardic, tachypnic and hypoxic requiring 12L O2. Labs showed leukocytosis (16.9), elevated d-dimer (1.94), hypokalemia (3.4), elevated BNP (355), elevated troponin (0.089), elevated lactic acid (2.3). CTA showed no PE, presistent and worsening occlusion of left upper lobe bronchus with superimposed interstitial and alveolar infiltrates., increased ground glass opacities, moderate to large right pleural effusion, small left pleural effusion and other chronic stable changes.  . He was given fluids, vancomycin, zosyn, and DuoNeb x1. The patient was admitted for further management.       Overview/Hospital Course:  Lung Cancer with superimposed Pneumonia. Staph-like organisms growing in sputum, continue broad coverage until results.  Pulmonology consulted, appreciate assessment, will ask IR to tap the new pleural effusion for distinguish meant between malignant effusion, cardiac/HF effusion, or infection- thoracentesis labs  ordered. Multiple hospital admissions the last few months, poor medical understanding. Palliative care consult placed for 12/19. Thoracentesis labs sent 12/19, awaiting results. Apixaban and Plavix received early 12/18 morning, held for thoracentesis thereafter, restart after completed. Labs trended to normal. O2 requirement reduced from 12L to 8L. Mentation improved. Vanc/Ceft/Doxy stopped, will give 3 more doses of oral levoquin.       NAEON. Denies CP.   Eating okay. UOP wnl. BM wnl.       Review of Systems   Constitutional: Negative.    HENT: Negative.     Eyes: Negative.    Respiratory:  Positive for cough and shortness of breath.    Cardiovascular: Negative.    Gastrointestinal: Negative.    Endocrine: Negative.    Genitourinary: Negative.    Musculoskeletal: Negative.    Skin: Negative.    Allergic/Immunologic: Negative.    Neurological: Negative.    Psychiatric/Behavioral:  Positive for confusion.        Objective:     Vital Signs (Most Recent):  Temp: 97.9 °F (36.6 °C) (12/21/22 0437)  Pulse: 71 (12/21/22 0437)  Resp: 18 (12/21/22 0437)  BP: 120/73 (12/21/22 0437)  SpO2: (!) 93 % (12/21/22 0437)   Vital Signs (24h Range):  Temp:  [97.8 °F (36.6 °C)-98.6 °F (37 °C)] 97.9 °F (36.6 °C)  Pulse:  [] 71  Resp:  [18-22] 18  SpO2:  [91 %-96 %] 93 %  BP: (112-125)/(69-78) 120/73     Weight: 87.5 kg (192 lb 14.4 oz)  Body mass index is 27.68 kg/m².    Physical Exam  Vitals and nursing note reviewed.   Constitutional:       General: He is not in acute distress.     Appearance: Normal appearance. He is not ill-appearing.   HENT:      Head: Normocephalic and atraumatic.      Nose: Nose normal.      Mouth/Throat:      Mouth: Mucous membranes are moist.   Eyes:      Extraocular Movements: Extraocular movements intact.   Cardiovascular:      Rate and Rhythm: Normal rate.      Pulses: Normal pulses.      Heart sounds: No murmur heard.  Pulmonary:      Effort: Respiratory distress present.      Comments: Diffusely  rhodochrous with faint expiratory wheezes.   Abdominal:      General: Abdomen is flat.      Palpations: Abdomen is soft.      Tenderness: There is no abdominal tenderness.   Musculoskeletal:      Right lower leg: No edema.      Left lower leg: No edema.   Skin:     General: Skin is warm.      Capillary Refill: Capillary refill takes less than 2 seconds.   Neurological:      General: No focal deficit present.      Mental Status: He is alert. He is disoriented.      Comments: AOx2   Psychiatric:         Mood and Affect: Mood normal.         Cognition and Memory: Cognition is impaired. Memory is impaired.               Recent Results (from the past 24 hour(s))   Vancomycin, random    Collection Time: 12/20/22  5:32 PM   Result Value Ref Range    Vancomycin, Random 13.2 Not established ug/mL   Phosphorus    Collection Time: 12/21/22  5:27 AM   Result Value Ref Range    Phosphorus 3.9 2.7 - 4.5 mg/dL   Magnesium    Collection Time: 12/21/22  5:27 AM   Result Value Ref Range    Magnesium 2.1 1.6 - 2.6 mg/dL   Comprehensive Metabolic Panel    Collection Time: 12/21/22  5:27 AM   Result Value Ref Range    Sodium 139 136 - 145 mmol/L    Potassium 4.6 3.5 - 5.1 mmol/L    Chloride 106 95 - 110 mmol/L    CO2 24 23 - 29 mmol/L    Glucose 104 70 - 110 mg/dL    BUN 11 8 - 23 mg/dL    Creatinine 0.9 0.5 - 1.4 mg/dL    Calcium 8.6 (L) 8.7 - 10.5 mg/dL    Total Protein 5.8 (L) 6.0 - 8.4 g/dL    Albumin 2.6 (L) 3.5 - 5.2 g/dL    Total Bilirubin 0.5 0.1 - 1.0 mg/dL    Alkaline Phosphatase 87 55 - 135 U/L    AST 18 10 - 40 U/L    ALT 19 10 - 44 U/L    Anion Gap 9 8 - 16 mmol/L    eGFR >60 >60 mL/min/1.73 m^2   CBC Auto Differential    Collection Time: 12/21/22  5:27 AM   Result Value Ref Range    WBC 8.63 3.90 - 12.70 K/uL    RBC 3.60 (L) 4.60 - 6.20 M/uL    Hemoglobin 11.0 (L) 14.0 - 18.0 g/dL    Hematocrit 31.4 (L) 40.0 - 54.0 %    MCV 87 82 - 98 fL    MCH 30.6 27.0 - 31.0 pg    MCHC 35.0 32.0 - 36.0 g/dL    RDW 15.2 (H) 11.5 - 14.5 %     Platelets 254 150 - 450 K/uL    MPV 10.1 9.2 - 12.9 fL    Immature Granulocytes 4.8 (H) 0.0 - 0.5 %    Gran # (ANC) 5.2 1.8 - 7.7 K/uL    Immature Grans (Abs) 0.41 (H) 0.00 - 0.04 K/uL    Lymph # 1.7 1.0 - 4.8 K/uL    Mono # 1.3 (H) 0.3 - 1.0 K/uL    Eos # 0.0 0.0 - 0.5 K/uL    Baso # 0.10 0.00 - 0.20 K/uL    nRBC 0 0 /100 WBC    Gran % 59.9 38.0 - 73.0 %    Lymph % 19.1 18.0 - 48.0 %    Mono % 14.5 4.0 - 15.0 %    Eosinophil % 0.5 0.0 - 8.0 %    Basophil % 1.2 0.0 - 1.9 %    Differential Method Automated        Microbiology Results (last 7 days)       Procedure Component Value Units Date/Time    Aerobic culture [[id number]] Collected: 12/19/22 0759    Order Status: Completed Specimen: Pleural Fluid from Lung, Right Updated: 12/20/22 1102     Aerobic Bacterial Culture No growth    Blood culture #2 **CANNOT BE ORDERED STAT** [[id number]] Collected: 12/16/22 0224    Order Status: Completed Specimen: Blood Updated: 12/20/22 0303     Blood Culture, Routine No Growth after 4 days.    Blood culture #1 **CANNOT BE ORDERED STAT** [[id number]] Collected: 12/16/22 0224    Order Status: Completed Specimen: Blood Updated: 12/20/22 0303     Blood Culture, Routine No Growth after 4 days.    Gram stain [[id number]] Collected: 12/19/22 0759    Order Status: Completed Specimen: Pleural Fluid from Lung, Right Updated: 12/19/22 0843     Gram Stain Result Cytospin indicates:      Many WBC's      No organisms seen    Culture, Anaerobic [[id number]] Collected: 12/19/22 0759    Order Status: Sent Specimen: Pleural Fluid from Lung, Right Updated: 12/19/22 0815    Culture, Respiratory with Gram Stain [[id number]] Collected: 12/16/22 0706    Order Status: Completed Specimen: Sputum Updated: 12/18/22 0548     Respiratory Culture Normal respiratory maksim     Gram Stain (Respiratory) <10 epithelial cells per low power field.     Gram Stain (Respiratory) Few WBC's     Gram Stain (Respiratory) Moderate Gram positive cocci in pairs and clusters              Imaging Results               CTA Chest Non-Coronary (PE Studies) (Final result)  Result time 12/16/22 03:55:50      Final result by Jordin Sofia MD (12/16/22 03:55:50)                   Impression:      There is no large central saddle type pulmonary embolus, when accounting for limitations of the examination there is no additional evidence for pulmonary embolus on this exam.    Persistent abnormal appearance of the left upper lobe with suspected occlusion of the left upper lobe bronchus, and volume loss of the left upper lobe however there is progression of abnormality, with increased pattern of interstitial and alveolar opacity, which may relate to superimposed interstitial and alveolar infiltrates/airspace disease and consolidative change and volume loss.    The lungs bilaterally demonstrate appearance of increased interstitial and ground-glass opacity with additional areas of confluent infiltrates/airspace disease having developed at the left lower lobe inferiorly.    Previously identified opacity that may relate to chronic and consolidative change of the left upper lobe posterior medially appears similar to the prior study.    Mild opacity within the trachea layering posteriorly superior to the jonnathan may relate to secretions or aspiration.  Bilateral peribronchial thickening is also noted.    Moderate to large right pleural effusion, small left pleural effusion.  Small pericardial effusion.    Left supraclavicular lesion again noted, not well evaluated on this examination as discussed above, similarly the previously identified left upper lobe nodule is obscured due to the findings of the left upper lobe at this time.    Additional findings as above.    This report was flagged in Epic as abnormal.      Electronically signed by: Jordin Sofia  Date:    12/16/2022  Time:    03:55               Narrative:    EXAMINATION:  CTA CHEST NON CORONARY (PE STUDIES)    CLINICAL HISTORY:  Pulmonary embolism  (PE) suspected, positive D-dimer;    TECHNIQUE:  Low dose axial images, sagittal and coronal reformations were obtained from the thoracic inlet to the lung bases following the IV administration of 75 mL of Omnipaque 350.  Contrast timing was optimized to evaluate the pulmonary arteries.  MIP images were performed.    COMPARISON:  Prior examinations, most recent of which is CT examination of the chest November 18, 2022    FINDINGS:  There is no large central saddle type pulmonary embolus.  When accounting for artifact the pulmonary arterial vascular demonstrate opacification without evidence for abnormal pattern of pulmonary arterial filling defects specific for pulmonary emboli.    There is a right-sided central venous catheter noted, the distal tip extends to the SVC.  Better demonstrated on the prior examination there is a supraclavicular/retroclavicular soft tissue lesion, consistent with a soft tissue mass or enlarged adenopathy, this is difficult to accurately measure on this exam, as the examination was optimized for evaluation of the pulmonary arterial vasculature rather than soft tissues.    There is a moderate to large pleural effusion on the right, and a small pleural effusion on the left, this is seen as an interval change.  There is also a small pericardial effusion.  The thoracic aorta demonstrates appropriate opacification.  Atherosclerotic change noted.    The lungs demonstrate chronic change with emphysematous change noted.  The right hemithorax demonstrates appearance of interstitial infiltrate/edema and ground-glass infiltrate throughout the right hemithorax, there is atelectasis noted, most notably at the right lower lobe, associated with the aforementioned pleural effusion.    As previously noted there is appearance thought to represent occlusion of the left upper lobe bronchus.  There is volume loss of the left upper lobe, there is increased pattern of interstitial as well as alveolar opacity,  prominent confluent opacity may relate to components of consolidation, and volume loss.  The previously identified nodule at the left upper lobe medially is obscured by the aforementioned.  The left lower lobe demonstrates interstitial edema/infiltrate, as well as a diffuse pattern of ground-glass infiltrate.  There is an area of superimposed opacity consistent with consolidative change posteriorly medially appearing similar to the prior examination.  There is additional appearance of confluent infiltrates/airspace disease at the left lung base.    There is mild opacity layering posteriorly within the trachea just above the level of the jonnathan this may relate to secretions or aspiration.  There is peribronchial thickening bilaterally most notably at the lung bases.  There is no evidence for pneumothorax.    Limited imaging of the upper abdomen demonstrates no evidence for acute upper abdominal process.  The visualized osseous structures demonstrate chronic change.                                       X-Ray Chest 1 View (Final result)  Result time 12/16/22 01:02:44      Final result by Luis Enrique Schroeder MD (12/16/22 01:02:44)                   Impression:      Stable pleural and parenchymal opacities and Port-A-Cath with no acute findings radiographically.      Electronically signed by: Luis Enrique Schroeder  Date:    12/16/2022  Time:    01:02               Narrative:    EXAMINATION:  XR CHEST 1 VIEW    CLINICAL HISTORY:  shortness of breath;    TECHNIQUE:  Single frontal view of the chest was performed.    COMPARISON:  11/03/2002    FINDINGS:  Pleural and parenchymal opacities do not appear significantly changed.  Port-A-Cath remains in position.  Bones remain intact.  The heart mediastinal contours are stable.                                            Assessment/Plan:      * Acute on chronic respiratory failure with hypoxia  Patient with Hypoxic Respiratory failure which is Acute on chronic.  he is on home oxygen at  2 LPM. Supplemental oxygen was provided and noted- Oxygen Concentration (%):  [100] 100.   Signs/symptoms of respiratory failure include- increased work of breathing and respiratory distress. Contributing diagnoses includes - COPD and Pneumonia Labs and images were reviewed. Patient Has recent ABG, which has been reviewed. Will treat underlying causes and adjust management of respiratory failure as follows- see plans for HAP & COPD exacerbation   Oxygen reduced from 12L to 8L 12/19    Pleural effusion  Pulmonology consulted, appreciate assessment:    Present on chest CTA from 12/17 but not present on most recent CT from a month before.  History of diastolic CHF, as well as lung cancer with metastases to right-sided ribs.   Given the timeframe of CT changes, I suspect this is likely due to CHF (probably diastolic) rather than infection or cancer.  However, given known cancer and immunosuppression from chemotherapy, I think that right thoracentesis is appropriate when anticoagulation can be safely held.    · Cardiac/HF effusion vs Infx/parapneumonic effusion vs malignant effusion  · Will ask IR to tap the new pleural effusion for distinguish   · Thoracentesis labs ordered.   · Multiple hospital admissions the last few months, poor medical understanding- Palliative care consult placed for 12/19.  · Thoracentesis labs sent 12/19, awaiting results.   · WBC# has normalized for first time 12/19/22  · Vanc/Ceft/Doxy stopped, will give 3 more doses of oral levoquin.     Persistent atrial fibrillation  [id number] Score: 1.  Resume home medications   AC held for thora, restart 12/20    Chronic obstructive pulmonary disease with acute exacerbation  - History of COPD on supplemental O2, 2L NC at home   - Presented with worsening SOB   - Likely related to a COPD exacerbation in the setting of pneumonia      Plan:   - Continue steroids.  - Duo-Nebs scheduled  - Wean O2 as tolerated    Coronary artery disease involving native  coronary artery of native heart without angina pectoris  Denies chest pain  Resume aspirin, plavix, and statin      PAD (peripheral artery disease)  Continue home medications       Chronic diastolic heart failure  -Echocardiogram (2/22/2022): EF: 35%-40%, GIDD, normal RV systolic function, mild TR   -Echocardiogram (5/18/2022): EF: 65%, indeterminate LV diastolic function, mild TR, PA pressure of 54 mmHg  -Echocardiogram (9/30/2022): EF: 60%, mildly reduced RV systolic function, PA pressure of 40 mmHg.   -Elevated BNP, around baseline   -Doubt exacerbation   -Resume home Lasix       Essential hypertension  Resume home medications       Lung cancer, main bronchus, left  On maintenance chemotherapy and radiation   Outpatient follow up with palliative care/oncology       HAP (hospital-acquired pneumonia)  Presented with SOB, cough   CTA showed no PE, presistent and worsening occlusion of left upper lobe bronchus with superimposed interstitial and alveolar infiltrates., increased ground glass opacities, moderate to large right pleural effusion, small left pleural effusion and other chronic stable change.   Concern for HAP/post obstructive pneumonia    Plan:   - Vancomycin, zosyn and doxycyline   - Respiratory cultures   - Wean O2 as tolerated     VTE Risk Mitigation (From admission, onward)         Ordered     apixaban tablet 5 mg  2 times daily         12/19/22 0832     IP VTE HIGH RISK PATIENT  Once         12/16/22 0435     Place sequential compression device  Until discontinued         12/16/22 0435                Discharge Planning   AZ: 12/19/2022     Code Status: Full Code   Is the patient medically ready for discharge?:     Reason for patient still in hospital (select all that apply): Patient trending condition and Treatment  Discharge Plan A: Home with family   Discharge Delays: None known at this time              Neel Dupree MD  Department of Hospital Medicine   [facility] - Telemetry

## 2022-12-21 NOTE — PROGRESS NOTES
Vancomycin level has been retimed since last note entry.  New trough is due 12/22/2022 at 21:00.

## 2022-12-21 NOTE — PROGRESS NOTES
Pharmacokinetic Assessment Follow Up: IV Vancomycin    Vancomycin serum concentration assessment(s):    The trough level was drawn correctly and can be used to guide therapy at this time. The measurement is within the desired definitive target range of 10 to 20 mcg/mL.    Vancomycin Regimen Plan:    Change regimen to Vancomycin 1000 mg IV every 24 hours with next serum trough concentration measured at 18:00 prior to 3rd dose on 12/22/2022.    Drug levels (last 3 results):  Recent Labs   Lab Result Units 12/19/22  0504 12/20/22  0457 12/20/22  1732   Vancomycin, Random ug/mL  --   --  13.2   Vancomycin-Trough ug/mL 19.4 22.1*  --        Pharmacy will continue to follow and monitor vancomycin.    Please contact pharmacy at extension [phone] for questions regarding this assessment.    Thank you for the consult,   Janette Harris       Patient brief summary:  Kashif Iverson is a 62 y.o. male initiated on antimicrobial therapy with IV Vancomycin for treatment of  pneumonia    Drug Allergies:   Review of patient's allergies indicates:  No Known Allergies    Actual Body Weight:   87.5 kg    Renal Function:   Estimated Creatinine Clearance: 79.1 mL/min (based on SCr of 1 mg/dL).,     Dialysis Method (if applicable):  N/A    CBC (last 72 hours):  Recent Labs   Lab Result Units 12/18/22  0511 12/19/22  0504 12/20/22  0457   WBC K/uL 14.98* 6.94 8.23   Hemoglobin g/dL 10.9* 10.0* 11.5*   Hematocrit % 31.0* 29.3* 33.5*   Platelets K/uL 337 282 305   Gran % % 92.6* 79.7* 64.7   Lymph % % 5.0* 12.8* 18.5   Mono % % 1.3* 6.2 13.1   Eosinophil % % 0.1 0.0 0.4   Basophil % % 0.2 0.0 0.7   Differential Method  Automated Automated Automated       Metabolic Panel (last 72 hours):  Recent Labs   Lab Result Units 12/18/22  0511 12/19/22  0504 12/19/22  0800 12/20/22  0457   Sodium mmol/L 136 139  --  139   Potassium mmol/L 3.1* 4.0  --  3.6   Chloride mmol/L 101 104  --  101   CO2 mmol/L 25 26  --  25   Glucose mg/dL 122* 137*  --  139*    Glucose, Fluid mg/dL  --   --  151  --    BUN mg/dL 17 18  --  16   Creatinine mg/dL 0.9 0.9  --  1.0   Albumin g/dL 2.8* 2.5*  --  2.8*   Total Bilirubin mg/dL 0.6 0.4  --  0.4   Alkaline Phosphatase U/L 79 76  --  107   AST U/L 12 12  --  23   ALT U/L 12 10  --  22   Magnesium mg/dL 1.8 1.9  --  1.6   Phosphorus mg/dL 2.4* 3.3  --  3.0       Vancomycin Administrations:  vancomycin given in the last 96 hours                     vancomycin in dextrose 5 % 1 gram/250 mL IVPB 1,000 mg (mg) 1,000 mg New Bag 12/19/22 1701     1,000 mg New Bag  0531     1,000 mg New Bag 12/18/22 1703     1,000 mg New Bag  0600    vancomycin 1.25 g in dextrose 5% 250 mL IVPB (ready to mix) (mg) 1,250 mg New Bag 12/17/22 0609     1,250 mg New Bag 12/16/22 2034                    Microbiologic Results:  Microbiology Results (last 7 days)       Procedure Component Value Units Date/Time    Aerobic culture [[id number]] Collected: 12/19/22 0759    Order Status: Completed Specimen: Pleural Fluid from Lung, Right Updated: 12/20/22 1102     Aerobic Bacterial Culture No growth    Blood culture #2 **CANNOT BE ORDERED STAT** [[id number]] Collected: 12/16/22 0224    Order Status: Completed Specimen: Blood Updated: 12/20/22 0303     Blood Culture, Routine No Growth after 4 days.    Blood culture #1 **CANNOT BE ORDERED STAT** [[id number]] Collected: 12/16/22 0224    Order Status: Completed Specimen: Blood Updated: 12/20/22 0303     Blood Culture, Routine No Growth after 4 days.    Gram stain [[id number]] Collected: 12/19/22 0759    Order Status: Completed Specimen: Pleural Fluid from Lung, Right Updated: 12/19/22 0843     Gram Stain Result Cytospin indicates:      Many WBC's      No organisms seen    Culture, Anaerobic [[id number]] Collected: 12/19/22 0759    Order Status: Sent Specimen: Pleural Fluid from Lung, Right Updated: 12/19/22 0815    Culture, Respiratory with Gram Stain [[id number]] Collected: 12/16/22 0706    Order Status: Completed Specimen:  Sputum Updated: 12/18/22 0548     Respiratory Culture Normal respiratory maksim     Gram Stain (Respiratory) <10 epithelial cells per low power field.     Gram Stain (Respiratory) Few WBC's     Gram Stain (Respiratory) Moderate Gram positive cocci in pairs and clusters

## 2022-12-22 NOTE — ASSESSMENT & PLAN NOTE
Pulmonology consulted, appreciate assessment:    Present on chest CTA from 12/17 but not present on most recent CT from a month before.  History of diastolic CHF, as well as lung cancer with metastases to right-sided ribs.   Given the timeframe of CT changes, I suspect this is likely due to CHF (probably diastolic) rather than infection or cancer.  However, given known cancer and immunosuppression from chemotherapy, I think that right thoracentesis is appropriate when anticoagulation can be safely held.  · S/p thoracentesis  · Multiple hospital admissions the last few months, poor medical understanding- Palliative care consult placed for 12/19.  Pleural fluid chemistry c/w exudative.  No bacteria on gram stain.  cxr with improvement of right lung.  · Vanc/Ceft/Doxy stopped, transitioned to oral Levaquin.

## 2022-12-22 NOTE — ASSESSMENT & PLAN NOTE
Presented with SOB, cough   CTA showed no PE, presistent and worsening occlusion of left upper lobe bronchus with superimposed interstitial and alveolar infiltrates., increased ground glass opacities, moderate to large right pleural effusion, small left pleural effusion and other chronic stable change.   Concern for HAP/post obstructive pneumonia  Initially on IV broad spectrum ABx's.  Transitioned to oral Levaquin.

## 2022-12-22 NOTE — PLAN OF CARE
Problem: Infection  Goal: Absence of Infection Signs and Symptoms  Outcome: Ongoing, Progressing     Problem: Fall Injury Risk  Goal: Absence of Fall and Fall-Related Injury  Outcome: Ongoing, Progressing     Problem: Thought Process Alteration  Goal: Optimal Thought Clarity  Outcome: Ongoing, Progressing     Problem: ARDS (Acute Respiratory Distress Syndrome)  Goal: Effective Oxygenation  Outcome: Ongoing, Progressing     Problem: Dysrhythmia  Goal: Normalized Cardiac Rhythm  Outcome: Ongoing, Progressing     Problem: Coping Ineffective  Goal: Effective Coping  Outcome: Ongoing, Progressing   No acute events to report. Oxygen weaned down to 1.5l-2l. Per MD, start to wean oxygen as long as sats remain over 88%. Pt less agitated than previous encounter, requires redirection at times to leave equipment alone.

## 2022-12-22 NOTE — PROGRESS NOTES
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     Patient reports developing worsening shortness of breath and productive cough since he took his last chemotherapy dose on 12/13. He is on 2L O2 at home, and SpO2 readings were as low as 80%, and would not improved despite increasing the O2 to 4L. He has multiple admissions for similar presentation. No longer a smoker. In the ED, the patient was tachycardic, tachypnic and hypoxic requiring 12L O2. Labs showed leukocytosis (16.9), elevated d-dimer (1.94), hypokalemia (3.4), elevated BNP (355), elevated troponin (0.089), elevated lactic acid (2.3). CTA showed no PE, presistent and worsening occlusion of left upper lobe bronchus with superimposed interstitial and alveolar infiltrates., increased ground glass opacities, moderate to large right pleural effusion, small left pleural effusion and other chronic stable changes.  . He was given fluids, vancomycin, zosyn, and DuoNeb x1. The patient was admitted for further management.       Overview/Hospital Course:  Lung Cancer with superimposed Pneumonia. Staph-like organisms growing in sputum, continue broad coverage until results.  Pulmonology consulted, appreciate assessment, will ask IR to tap the new pleural effusion for distinguish meant between malignant effusion, cardiac/HF effusion, or infection- thoracentesis  labs ordered. Multiple hospital admissions the last few months, poor medical understanding. Palliative care consult placed for 12/19. Thoracentesis done on 12/19. Apixaban and Plavix received early 12/18 morning, held for thoracentesis thereafter, restart after completed. Labs trended to normal. O2 requirement reduced from 12L to 8L to 5L. Mentation improved. Vanc/Ceft/Doxy stopped and transitioned to oral Levaquin.      Interval History: no new complaints.    Review of Systems   HENT:  Negative for ear discharge and ear pain.    Eyes:  Negative for discharge and itching.   Endocrine: Negative for cold intolerance and heat intolerance.   Neurological:  Negative for seizures and syncope.   Objective:     Vital Signs (Most Recent):  Temp: 98 °F (36.7 °C) (12/22/22 1127)  Pulse: 77 (12/22/22 1127)  Resp: 20 (12/22/22 1127)  BP: 121/79 (12/22/22 1127)  SpO2: 96 % (12/22/22 1127) Vital Signs (24h Range):  Temp:  [98 °F (36.7 °C)-98.6 °F (37 °C)] 98 °F (36.7 °C)  Pulse:  [71-82] 77  Resp:  [16-20] 20  SpO2:  [91 %-96 %] 96 %  BP: (105-135)/(61-79) 121/79     Weight: 87.5 kg (192 lb 14.4 oz)  Body mass index is 27.68 kg/m².    Intake/Output Summary (Last 24 hours) at 12/22/2022 1210  Last data filed at 12/22/2022 0909  Gross per 24 hour   Intake 600 ml   Output 2100 ml   Net -1500 ml      Physical Exam  Vitals and nursing note reviewed.   Cardiovascular:      Rate and Rhythm: Normal rate and regular rhythm.      Heart sounds: Normal heart sounds. No murmur heard.    No gallop.   Pulmonary:      Effort: No respiratory distress.      Breath sounds: No wheezing or rales.   Musculoskeletal:      Right lower leg: No edema.      Left lower leg: No edema.   Neurological:      General: No focal deficit present.      Mental Status: He is alert.   Psychiatric:         Attention and Perception: He is inattentive.         Mood and Affect: Mood is depressed.         Behavior: Behavior is slowed.       Significant Labs: All pertinent  labs within the past 24 hours have been reviewed.  BMP:   Recent Labs   Lab 12/21/22  0527         K 4.6      CO2 24   BUN 11   CREATININE 0.9   CALCIUM 8.6*   MG 2.1     CBC:   Recent Labs   Lab 12/21/22  0527   WBC 8.63   HGB 11.0*   HCT 31.4*          Significant Imaging: I have reviewed all pertinent imaging results/findings within the past 24 hours.      Assessment/Plan:      * Acute on chronic respiratory failure with hypoxia  Patient with Hypoxic Respiratory failure which is Acute on chronic.  he is on home oxygen at 2 LPM. Supplemental oxygen was provided and noted- Oxygen Concentration (%):  [100] 100.   Signs/symptoms of respiratory failure include- increased work of breathing and respiratory distress. Contributing diagnoses includes - COPD and Pneumonia Labs and images were reviewed. Patient Has recent ABG, which has been reviewed. Will treat underlying causes and adjust management of respiratory failure as follows- see plans for HAP & COPD exacerbation   Oxygen reduced from 12L to 8L to 5L  Continue weaning oxygen to home 2-3L.  Possibly home tomorrow.    Pleural effusion  Pulmonology consulted, appreciate assessment:    Present on chest CTA from 12/17 but not present on most recent CT from a month before.  History of diastolic CHF, as well as lung cancer with metastases to right-sided ribs.   Given the timeframe of CT changes, I suspect this is likely due to CHF (probably diastolic) rather than infection or cancer.  However, given known cancer and immunosuppression from chemotherapy, I think that right thoracentesis is appropriate when anticoagulation can be safely held.  · S/p thoracentesis  · Multiple hospital admissions the last few months, poor medical understanding- Palliative care consult placed for 12/19.  Pleural fluid chemistry c/w exudative.  No bacteria on gram stain.  cxr with improvement of right lung.  · Vanc/Ceft/Doxy stopped, transitioned to oral  Levaquin.    Persistent atrial fibrillation  [id number] Score: 1.  Resume home medications       Chronic obstructive pulmonary disease with acute exacerbation  - History of COPD on supplemental O2, 2L NC at home   - Presented with worsening SOB   - Likely related to a COPD exacerbation in the setting of pneumonia      Plan:   - Continue steroids.  - Duo-Nebs scheduled  - Wean O2 as tolerated    Coronary artery disease involving native coronary artery of native heart without angina pectoris  Denies chest pain  Resume aspirin, plavix, and statin      PAD (peripheral artery disease)  Continue home medications       Chronic diastolic heart failure  -Echocardiogram (2/22/2022): EF: 35%-40%, GIDD, normal RV systolic function, mild TR   -Echocardiogram (5/18/2022): EF: 65%, indeterminate LV diastolic function, mild TR, PA pressure of 54 mmHg  -Echocardiogram (9/30/2022): EF: 60%, mildly reduced RV systolic function, PA pressure of 40 mmHg.   -Elevated BNP, around baseline   -Doubt exacerbation   -Resume home Lasix       Essential hypertension  Resume home medications       Lung cancer, main bronchus, left  On maintenance chemotherapy and radiation   Outpatient follow up with palliative care/oncology       HAP (hospital-acquired pneumonia)  Presented with SOB, cough   CTA showed no PE, presistent and worsening occlusion of left upper lobe bronchus with superimposed interstitial and alveolar infiltrates., increased ground glass opacities, moderate to large right pleural effusion, small left pleural effusion and other chronic stable change.   Concern for HAP/post obstructive pneumonia  Initially on IV broad spectrum ABx's.  Transitioned to oral Levaquin.      VTE Risk Mitigation (From admission, onward)         Ordered     apixaban tablet 5 mg  2 times daily         12/19/22 0832     IP VTE HIGH RISK PATIENT  Once         12/16/22 0435     Place sequential compression device  Until discontinued         12/16/22 0435                 Discharge Planning   AZ: 12/19/2022     Code Status: Full Code   Is the patient medically ready for discharge?:     Reason for patient still in hospital (select all that apply): Patient trending condition  Discharge Plan A: Home with family   Discharge Delays: None known at this time              Ant Martin MD  Department of [facility] Medicine   [facility]

## 2022-12-22 NOTE — SUBJECTIVE & OBJECTIVE
Interval History: no new complaints.    Review of Systems   HENT:  Negative for ear discharge and ear pain.    Eyes:  Negative for discharge and itching.   Endocrine: Negative for cold intolerance and heat intolerance.   Neurological:  Negative for seizures and syncope.   Objective:     Vital Signs (Most Recent):  Temp: 98 °F (36.7 °C) (12/22/22 1127)  Pulse: 77 (12/22/22 1127)  Resp: 20 (12/22/22 1127)  BP: 121/79 (12/22/22 1127)  SpO2: 96 % (12/22/22 1127) Vital Signs (24h Range):  Temp:  [98 °F (36.7 °C)-98.6 °F (37 °C)] 98 °F (36.7 °C)  Pulse:  [71-82] 77  Resp:  [16-20] 20  SpO2:  [91 %-96 %] 96 %  BP: (105-135)/(61-79) 121/79     Weight: 87.5 kg (192 lb 14.4 oz)  Body mass index is 27.68 kg/m².    Intake/Output Summary (Last 24 hours) at 12/22/2022 1210  Last data filed at 12/22/2022 0909  Gross per 24 hour   Intake 600 ml   Output 2100 ml   Net -1500 ml      Physical Exam  Vitals and nursing note reviewed.   Cardiovascular:      Rate and Rhythm: Normal rate and regular rhythm.      Heart sounds: Normal heart sounds. No murmur heard.    No gallop.   Pulmonary:      Effort: No respiratory distress.      Breath sounds: No wheezing or rales.   Musculoskeletal:      Right lower leg: No edema.      Left lower leg: No edema.   Neurological:      General: No focal deficit present.      Mental Status: He is alert.   Psychiatric:         Attention and Perception: He is inattentive.         Mood and Affect: Mood is depressed.         Behavior: Behavior is slowed.       Significant Labs: All pertinent labs within the past 24 hours have been reviewed.  BMP:   Recent Labs   Lab 12/21/22  0527         K 4.6      CO2 24   BUN 11   CREATININE 0.9   CALCIUM 8.6*   MG 2.1     CBC:   Recent Labs   Lab 12/21/22  0527   WBC 8.63   HGB 11.0*   HCT 31.4*          Significant Imaging: I have reviewed all pertinent imaging results/findings within the past 24 hours.

## 2022-12-22 NOTE — ASSESSMENT & PLAN NOTE
Patient with Hypoxic Respiratory failure which is Acute on chronic.  he is on home oxygen at 2 LPM. Supplemental oxygen was provided and noted- Oxygen Concentration (%):  [100] 100.   Signs/symptoms of respiratory failure include- increased work of breathing and respiratory distress. Contributing diagnoses includes - COPD and Pneumonia Labs and images were reviewed. Patient Has recent ABG, which has been reviewed. Will treat underlying causes and adjust management of respiratory failure as follows- see plans for HAP & COPD exacerbation   Oxygen reduced from 12L to 8L to 5L  Continue weaning oxygen to home 2-3L.  Possibly home tomorrow.

## 2022-12-23 PROBLEM — J96.21 ACUTE ON CHRONIC RESPIRATORY FAILURE WITH HYPOXIA: Status: RESOLVED | Noted: 2022-06-10 | Resolved: 2022-01-01

## 2022-12-23 NOTE — NURSING
Spoke with Monitor Yamilet Mccray Pt back up to 89% . Pt is in no Distress . Denies chest pain or being Significantly SOB .Bed down SR up x 2 HOB . Call bell in reach.

## 2022-12-23 NOTE — PLAN OF CARE
Problem: Adult Inpatient Plan of Care  Goal: Plan of Care Review  Outcome: Met  Goal: Patient-Specific Goal (Individualized)  Outcome: Met  Goal: Absence of Hospital-Acquired Illness or Injury  Outcome: Met  Goal: Optimal Comfort and Wellbeing  Outcome: Met  Goal: Readiness for Transition of Care  Outcome: Met     Problem: Fluid Imbalance (Pneumonia)  Goal: Fluid Balance  Outcome: Met     Problem: Infection (Pneumonia)  Goal: Resolution of Infection Signs and Symptoms  Outcome: Met     Problem: Respiratory Compromise (Pneumonia)  Goal: Effective Oxygenation and Ventilation  Outcome: Met     Problem: Skin Injury Risk Increased  Goal: Skin Health and Integrity  Outcome: Met     Problem: Infection  Goal: Absence of Infection Signs and Symptoms  Outcome: Met     Problem: Fall Injury Risk  Goal: Absence of Fall and Fall-Related Injury  Outcome: Met     Problem: Thought Process Alteration  Goal: Optimal Thought Clarity  Outcome: Met     Problem: ARDS (Acute Respiratory Distress Syndrome)  Goal: Effective Oxygenation  Outcome: Met     Problem: Dysrhythmia  Goal: Normalized Cardiac Rhythm  Outcome: Met     Problem: Coping Ineffective  Goal: Effective Coping  Outcome: Met

## 2022-12-23 NOTE — PHYSICIAN QUERY
PT Name: Kashif Iverson  MR #: [id number]    DOCUMENTATION CLARIFICATION     CDS/: Yari Loving, RN, BSN               Contact information: [email or web address]  This form is a permanent document in the medical record.     Query Date: December 23, 2022    By submitting this query, we are merely seeking further clarification of documentation. Please utilize your independent clinical judgment when addressing the question(s) below.    The Medical Record contains the following:   Indicators   Supporting Clinical Findings Location in Medical Record   x AMS, Confusion,  LOC, etc.  He is alert and oriented but having trouble remembering why he came in until  I reminded him.    Positive for confusion.   Cognition is impaired. Memory is impaired.   He is disoriented.  Aox2    Poorly directable during interview.     Mentation improved. Palliative, DARYA Arango, NP, 12/19      HM, Dr. Dupree, 12/19          Pulmonology, Dr. Adair, 12/20.    HM, Dr. Dupree, 12/21     x Acute/Chronic Illness Acute on chronic respiratory failure with hypoxia  HAP (hospital-acquired pneumonia)  Chronic obstructive pulmonary disease with acute exacerbation  Chronic diastolic heart failure  SCC of the lung with metastasis to the bone (on maintenance gemcitabine and radiation, s/p chemoradiation and immunotherapy), COPD/bronchiestasis (on 2L O2), Afib (on Eliquis), HTN, HFpEF (EF: 65%), CVA, CAD, PAD   H&P, Dr. Marroquin, 12/16   x Radiology Findings CTA showed no PE, presistent and worsening occlusion of left upper lobe bronchus with superimposed interstitial and alveolar infiltrates., increased ground glass opacities, moderate to large right pleural effusion, small left pleural effusion and other chronic stable changes.   H&P, Dr. Marroquin, 12/16   x Electrolyte Imbalance  12/16/22 01:15 12/17/22 08:25 12/18/22 05:11 12/19/22 05:04 12/20/22 04:57 12/21/22 05:27   Potassium 3.4 (L) 2.9 (L) 3.1 (L) 4.0 3.6 4.6      12/17/22 08:25 12/18/22 05:11 12/19/22 05:04  12/20/22 04:57 12/21/22 05:27   Phosphorus 2.2 (L) 2.4 (L) 3.3 3.0 3.9      12/16/22 01:15 12/17/22 08:25 12/18/22 05:11 12/19/22 05:04 12/20/22 04:57 12/21/22 05:27   Calcium 8.6 (L) 8.7 8.9 8.7 8.7 8.6 (L)    Lab    Medication     x Treatment         Thoracentesis labs ordered.  - Vancomycin, zosyn and doxycyline   - Respiratory cultures   - Wean O2 as tolerated    HM, Dr. Dupree, 12/20    Other       The noted clinical guidelines are only system guidelines and do not replace the providers clinical judgment.    The National [place] of Neurologic Disorders and Stroke (NINDS) of the NIH describes encephalopathy as any diffuse disease of the brain that alters brain function or structure.    Provider, please specify the diagnosis or diagnoses associated with above clinical findings.  [   ] Metabolic Encephalopathy - Due to electrolyte imbalance, metabolic derangements, or infectious processes, includes Septic Encephalopathy, Uremic Encephalopathy     [  x ] Encephalopathy, unspecified        [   ] Other Encephalopathy (please specify): ____________________     [   ] Other neurological condition- Includes Post-ictal altered mental status (please specify condition): __________     [   ]  Clinically Undetermined         Please document in your progress notes daily for the duration of treatment until resolved, and include in your discharge summary.    References:  VARINDER Kruse RN, CCDS. (2018, June 9). Notes from the Instructor: Encephalopathy tips. Retrieved October 22, 2020, from https://acdis.org/articles/note-instructor-encephalopathy-tips    ICD-9-CM Coding Clinic First Quarter 2013, Effective with discharges: October 21, 2013 [employer] [facility] [employer] § Seizure with encephalopathy due to postictal state (2013).    ICD-10-CM/PCS [employer] Integrated Codebook (Version V.20.8.10.0) [Computer software]. (2020). Retrieved October 21, 2020.    National [place] of Neurological Disorders and Stroke. (2019, March 27).  Retrieved October 22, 2020, from https://www.ninds.nih.gov/Disorders/All-Disorders/Xtmfwrfexpvemq-Wqotoismpen-Vnhm    Form No. 50844

## 2022-12-23 NOTE — NURSING
Monitor tech reports Sats is 81% . Pt took his O2 Off by accident her said . O2 1.5 liters back intact . Pt teaching again to used his Incentive Spirometer . I told the patient the Incentive Spirometer is his Best Friend just don,t wait until Day shift to use it . If he is awake at 1am,3am ect . Use it . Pt verbalized understanding . Denies any discomfort . No chest pain at this time .Bed  down SR up x 2 HOB . Call bell in reach .

## 2022-12-23 NOTE — NURSING
Report received from off going Nurse Carl . Pt laying supine in bed O1.5 Liters . Arms with Petichie Pt is on Eliquis . Pt might go home tomorrow. Bed down SR up x 2 HOB. Call bell in reach.

## 2022-12-23 NOTE — NURSING
Resp Probe changed . Informed Monitor Tech . She says pt is at 88% . Pt is breathing Resp even and relaxed . Sats now running between 90- 91 %. . Pt teaching on using his Incentive Spirometer to expand his lung capacity. Bed down SR up x 2 HOB. Call bell in reach.

## 2022-12-23 NOTE — NURSING
Report received from off going Nurse Carl . Pt laying supine in bed O2 1.5 Liters . Will go home hopefully in am if Sats stay above 88% . Has Petichia to his arms . Pt is on Eliquis .Denies pain or SOB. Bed down SR up x 2 HOB. Call bell in reach.

## 2022-12-23 NOTE — NURSING
Dc instructions reviewed with pt and spouse, AVS provided, all questions answered. Pt leaving unit on 2L nc, wears O2 at all times. NAND or reported. Pt reports readiness for dc.

## 2022-12-23 NOTE — PLAN OF CARE
"    [facility] Telemetry    HOME HEALTH ORDERS  FACE TO FACE ENCOUNTER    Patient Name: Kashif Iverson  YOB: 1960    PCP: Eduardo Ruiz MD   PCP Address: [address] [place] [address]  PCP Phone Number: [phone]  PCP Fax: [phone]       Encounter Date: 12/23/2022    Admit to Home Health    Diagnoses:  Active Hospital Problems    Diagnosis  POA    Pleural effusion [J90]  Yes     Present on chest CTA from 12/17 but not present on most recent CT from a month before.  History of diastolic CHF, as well as lung cancer with metastases to right-sided ribs.      Persistent atrial fibrillation [I48.19]  Yes    Chronic obstructive pulmonary disease with acute exacerbation [J44.1]  Yes    Coronary artery disease involving native coronary artery of native heart without angina pectoris [I25.10]  Yes     Chronic    PAD (peripheral artery disease) [I73.9]  Yes     LUIS FELIPE 3/11/22      Chronic diastolic heart failure [I50.32]  Yes    Essential hypertension [I10]  Yes     Chronic    Lung cancer, main bronchus, left [C34.02]  Yes     Chronic     8/30/2019 underwent bronchoscopy that showed "A partially obstructing (about 80% obstructed) mass was found in the middle portion in the left mainstem bronchus. The mass was large and bloody, circumferential, endobronchial, friable and necrotic. 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  No actionable mutations and PD-L1 5%.  9/19/21 staging PET CT showed "Hypermetabolic left lung mass concerning for primary pulmonary malignancy with metastatic disease involving the right 6th and 9th ribs as well as mediastinal and left supraclavicular lymph nodes."  Stage IV squamous cell carcinoma of the lung at diagnosis.    10/8/2019-10/21/2019 he received palliative chemoradiation for rib pain with carboplatin and paclitaxel.  He received 3 cycles of carboplatin and paclitaxel.  He developed " anaphylactic reaction to paclitaxel.  The chest was treated with AP:PA fields and the right 9th rib was treated with anterior and posterior oblique fields at 300 cGy per fraction to 3000 cGy.   Lt chest 6X 300 10 / 10 3,000   Rt 9th rib 6X 300 10 / 10 3,000     10/31/2019-9/10/2020 he received pembrolizumab (completed 15 cycles, last dose 8/21/2020)  9/10/2020 PET CT showed progression of disease.  He was then referred to Dr. Key for evaluation for clinical trials.  10/20/2020 he underwent repeat biopsy for LUNG MAP trial and was randomized to Ramucirumab + pembrolizumab.    11/17/2020 started ramucirumab+pembrolizumab.  Completed 4 cycles and then 2/10/2021 scans showed progression.    2/24/2021 he was subsequent started on gemcitabine with Dr. Cruz.      HAP (hospital-acquired pneumonia) [J18.9, Y95]  Yes      Resolved Hospital Problems    Diagnosis Date Resolved POA    *Acute on chronic respiratory failure with hypoxia [J96.21] 12/23/2022 Yes     Priority: 1 - High       Future Appointments   Date Time Provider Department Center   12/27/2022  9:00 AM Guerline Higgins MD [facility] HEM ONC [facility]   12/27/2022 11:45 AM CHAIR 06 [facility] CHEMO [facility]   12/28/2022 10:30 AM Pamela Boyle NP [facility] [place]      Follow-up Information       Eduardo Ruiz MD Follow up in 1 week(s).    Specialty: Family Medicine  Contact information:  [address] [facility]  [place] [address]  [phone]               Guerline Higgins MD Follow up in 2 week(s).    Specialty: Hematology and Oncology  Contact information:  [address]  [address]  [place] [id number] [phone]                               I have seen and examined this patient face to face today. My clinical findings that support the need for the home health skilled services and home bound status are the following:  Weakness/numbness causing balance and gait disturbance due to Malignancy/Cancer making it taxing to leave  home.    Allergies:Review of patient's allergies indicates:  No Known Allergies    Diet: cardiac diet    Activities: activity as tolerated    Nursing:   SN to complete comprehensive assessment including routine vital signs. Instruct on disease process and s/s of complications to report to MD. Review/verify medication list sent home with the patient at time of discharge  and instruct patient/caregiver as needed. Frequency may be adjusted depending on start of care date.    Notify MD if SBP > 160 or < 90; DBP > 90 or < 50; HR > 120 or < 50; Temp > 101      CONSULTS:    Physical Therapy to evaluate and treat. Evaluate for home safety and equipment needs; Establish/upgrade home exercise program. Perform / instruct on therapeutic exercises, gait training, transfer training, and Range of Motion.  Occupational Therapy to evaluate and treat. Evaluate home environment for safety and equipment needs. Perform/Instruct on transfers, ADL training, ROM, and therapeutic exercises.   to evaluate for community resources/long-range planning.    Medications: Review discharge medications with patient and family and provide education.      Current Discharge Medication List        START taking these medications    Details   metoprolol tartrate (LOPRESSOR) 50 MG tablet Take 1 tablet (50 mg total) by mouth 3 (three) times daily.  Qty: 90 tablet, Refills: 11    Comments: .           CONTINUE these medications which have NOT CHANGED    Details   albuterol (VENTOLIN HFA) 90 mcg/actuation inhaler Inhale 2 puffs into the lungs every 6 (six) hours as needed for Wheezing. Rescue  Qty: 18 g, Refills: 11      albuterol-ipratropium (DUO-NEB) 2.5 mg-0.5 mg/3 mL nebulizer solution Inhale contents of 1 vial (3 mLs) by nebulization every 4 (four) hours as needed for Wheezing or Shortness of Breath. Rescue  Qty: 90 mL, Refills: 1      amLODIPine (NORVASC) 10 MG tablet Take 1 tablet (10 mg total) by mouth once daily.  Qty: 90 tablet, Refills:  3    Comments: .      apixaban (ELIQUIS) 5 mg Tab Take 1 tablet (5 mg total) by mouth 2 (two) times daily.  Qty: 60 tablet, Refills: 1      ascorbic acid-multivit-min (VITAMIN C ENERGY BOOSTER) 1,000 mg PwEP Take 3,000 mg by mouth once daily. Patient takes 3,000 mg per day      aspirin 81 MG Chew Take 1 tablet (81 mg total) by mouth once daily.  Qty: 30 tablet, Refills: 11      atorvastatin (LIPITOR) 80 MG tablet Take 1 tablet (80 mg total) by mouth once daily.  Qty: 90 tablet, Refills: 3      cilostazoL (PLETAL) 100 MG Tab Take 1 tablet (100 mg total) by mouth 2 (two) times daily.  Qty: 60 tablet, Refills: 11      clopidogreL (PLAVIX) 75 mg tablet Take 1 tablet (75 mg total) by mouth once daily.  Qty: 30 tablet, Refills: 11      dronabinoL (MARINOL) 5 MG capsule Take 1 capsule (5 mg total) by mouth 2 (two) times daily before meals. for 14 days  Qty: 28 capsule, Refills: 0    Associated Diagnoses: Poor appetite      fluticasone-salmeterol diskus inhaler 100-50 mcg Inhale 1 puff into the lungs 2 (two) times daily. Controller  Qty: 60 each, Refills: 1      furosemide (LASIX) 40 MG tablet Take 1 tablet (40 mg total) by mouth 2 (two) times a day.  Qty: 60 tablet, Refills: 2      HYDROcodone-acetaminophen (NORCO) 7.5-325 mg per tablet Take 1 tablet by mouth every 4 (four) hours as needed. for pain.      polyethylene glycol (GLYCOLAX) 17 gram/dose powder Mix 1 capful (17 grams total) with a liquid and take by mouth once daily.  Qty: 510 g, Refills: 0      tiotropium (SPIRIVA) 18 mcg inhalation capsule Inhale 1 capsule (18 mcg total) into the lungs via handihaler once daily. Controller  Qty: 30 capsule, Refills: 1           STOP taking these medications       rivaroxaban (XARELTO) 20 mg Tab Comments:   Reason for Stopping:               I certify that this patient is confined to his home and needs intermittent skilled nursing care, physical therapy, and occupational therapy.

## 2022-12-23 NOTE — PLAN OF CARE
[facility] - Telemetry  Discharge Final Note    Primary Care Provider: Eduardo Ruiz MD    Expected Discharge Date: 12/23/2022    Final Discharge Note (most recent)       Final Note - 12/23/22 1210          Final Note    Assessment Type Final Discharge Note     Anticipated Discharge Disposition Home-Health Care Svc     What phone number can be called within the next 1-3 days to see how you are doing after discharge? [phone]     Hospital Resources/Appts/Education Provided Appointments scheduled and added to AVS;Appointments scheduled by Navigator/Coordinator        Post-Acute Status    Post-Acute Authorization Home Health     Home Health Status Set-up Complete/Auth obtained     Coverage Medicare A/B     Discharge Delays None known at this time                     Important Message from Medicare  Important Message from Medicare regarding Discharge Appeal Rights: Given to patient/caregiver, Explained to patient/caregiver     Date IMM was signed: 12/23/22  Time IMM was signed: 1201    Contact Info       Eduardo Ruiz MD   Specialty: Family Medicine   Relationship: PCP - General  Hypertension Digital Medicine Responsible Provider    [address]  [place] [facility] [place] [address]   Phone: [phone]       Next Steps: Follow up in 1 week(s)    Guerline Higgins MD   Specialty: Hematology and Oncology    [address]  [address]  [place] [address]   Phone: [phone]       Next Steps: Follow up in 2 week(s)    [facility]   Specialty: Hospice and Palliative Medicine    [address]  [address]  [place] [address]   Phone: [phone]       Next Steps: Follow up    [facility]   Specialty: Home Health Services    [address]  [address]  [place] [address]   Phone: [phone]       Next Steps: Follow up    Instructions: Home Health          SW spoke with patient nurse Carl that patient cleared from case management standpoint.

## 2022-12-23 NOTE — PHYSICIAN QUERY
PT Name: Kashif Iverson  MR #: [id number]     DOCUMENTATION CLARIFICATION     CDS/: Yair Lvoing, RN, BSN              Contact information: [email or web address]  This form is a permanent document in the medical record.    Query Date: December 23, 2022    By submitting this query, we are merely seeking further clarification of documentation.  Please utilize your independent clinical judgment when addressing the question(s) below.  The Medical Record contains the following:   Indicators   Supporting Clinical Findings Location in Medical Record   x Pneumonia documented HAP (hospital-acquired pneumonia)   H&P, Dr. Marroquin, 12/16   x Chest X-Ray/CT Scan CTA showed no PE, presistent and worsening occlusion of left upper lobe bronchus with superimposed interstitial and alveolar infiltrates., increased ground glass opacities, moderate to large right pleural effusion, small left pleural effusion and other chronic stable changes.   H&P, Dr. Marroquin, 12/16    PaO2    PaCO2     O2 sat      WBC      Vital Signs     x Cultures  Respiratory Culture-Normal respiratory maksim   Gram Stain (Respiratory)-<10 epithelial cells per low power field.   Gram Stain (Respiratory)-Few WBC's   Gram Stain (Respiratory)-Moderate Gram positive cocci in pairs and clusters      Lab, 12/16   x Treatment  LevoFLOXacin tablet 750 mg        MAR, 12/21    Supplemental O2      Dysphagia/Swallow study      Other       Provider, please further specify the Pneumonia Diagnosis related to the above clinical indicators:    [   ] Bacterial, gram positive organism Pneumonia (please further specify organism being treated):______     [  x ] Unspecified Pneumonia     [   ] Other type of pneumonia (please specify): ________     [  ] Clinically undetermined       Please document in your progress notes daily for the duration of treatment, until resolved, and include in your discharge summary.     Form No. [id number]

## 2022-12-23 NOTE — DISCHARGE SUMMARY
[facility] Medicine  Discharge Summary      Patient Name: Kashif Iverson  MRN: [id number]  [facility]: [id number]  Patient Class: IP- Inpatient  Admission Date: 12/16/2022  Hospital Length of Stay: 7 days  Discharge Date and Time:  12/23/2022 10:35 AM  Attending Physician: Ant Martin MD   Discharging Provider: Ant Martin MD  Primary Care Provider: Eduardo Ruiz MD    Primary Care Team: Networked reference to record PCT     HPI:   This is a 61 year old male with a PMHx of SCC of the lung with metastasis to the bone (on maintenance gemcitabine and radiation, s/p chemoradiation and immunotherapy), COPD/bronchiestasis (on 2L O2), Afib (on Eliquis), HTN, HFpEF (EF: 65%), CVA, CAD, PAD who presented for shortness of breath.     Patient reports developing worsening shortness of breath and productive cough since he took his last chemotherapy dose on 12/13. He is on 2L O2 at home, and SpO2 readings were as low as 80%, and would not improved despite increasing the O2 to 4L. He has multiple admissions for similar presentation. No longer a smoker. In the ED, the patient was tachycardic, tachypnic and hypoxic requiring 12L O2. Labs showed leukocytosis (16.9), elevated d-dimer (1.94), hypokalemia (3.4), elevated BNP (355), elevated troponin (0.089), elevated lactic acid (2.3). CTA showed no PE, presistent and worsening occlusion of left upper lobe bronchus with superimposed interstitial and alveolar infiltrates., increased ground glass opacities, moderate to large right pleural effusion, small left pleural effusion and other chronic stable changes.  . He was given fluids, vancomycin, zosyn, and DuoNeb x1. The patient was admitted for further management.       * No surgery found *      Hospital Course:   Lung Cancer with superimposed Pneumonia. Staph-like organisms growing in sputum, continued broad coverage until results.  Pulmonology consulted, appreciate assessment, will ask IR to tap the new pleural  effusion for distinguish meant between malignant effusion, cardiac/HF effusion, or infection- thoracentesis labs ordered. Multiple hospital admissions the last few months, poor medical understanding. Palliative care consult placed for 12/19. Thoracentesis done on 12/19. Apixaban and Plavix received early 12/18 morning, held for thoracentesis thereafter, restart after completed. Labs trended to normal. O2 requirement reduced from 12L to 8L to 5L. Mentation improved.  Pleural fluid chemistry c/w exudative.  No bacteria on gram stain.  cxr with improvement of right lung. Vanc/Ceft/Doxy stopped and transitioned to oral Levaquin.  Completed course of ABx's during hospital stay.  He has remained afebrile and hemodynamically stable.  Currently on home 2L oxygen per NC and stable.  He will be discharged home with HH to follow up with PCP and Oncology.       Goals of Care Treatment Preferences:  Code Status: Full Code          What is most important right now is to focus on spending time at home, avoiding the hospital, remaining as independent as possible, symptom/pain control, quality of life, even if it means sacrificing a little time.  Accordingly, we have decided that the best plan to meet the patient's goals includes enrolling in hospice care.      Consults:   Consults (From admission, onward)        Status Ordering Provider     Inpatient consult to Interventional Radiology  Once        Provider:  (Not yet assigned)    Completed MILTON JON A     Inpatient consult to Palliative Care  Once        Provider:  Eli Arango NP    Completed MILTON JON A     Inpatient consult to Midline team  Once        Provider:  (Not yet assigned)    Acknowledged REBECA GILL     Inpatient consult to Pulmonology  Once        Provider:  MD Jorge Rich MARK A     Inpatient consult to Midline team  Once        Provider:  (Not yet assigned)    Completed MILTON JON          No new Assessment & Plan notes have  been filed under this hospital service since the last note was generated.  Service: Hospital Medicine    Final Active Diagnoses:    Diagnosis Date Noted POA    Pleural effusion [J90] 12/18/2022 Yes    Persistent atrial fibrillation [I48.19] 09/30/2022 Yes    Chronic obstructive pulmonary disease with acute exacerbation [J44.1] 09/05/2022 Yes    Coronary artery disease involving native coronary artery of native heart without angina pectoris [I25.10] 03/14/2022 Yes     Chronic    PAD (peripheral artery disease) [I73.9] 03/14/2022 Yes    Chronic diastolic heart failure [I50.32] 02/23/2022 Yes    Essential hypertension [I10]  Yes     Chronic    Lung cancer, main bronchus, left [C34.02] 09/10/2019 Yes     Chronic    HAP (hospital-acquired pneumonia) [J18.9, Y95] 08/22/2019 Yes      Problems Resolved During this Admission:    Diagnosis Date Noted Date Resolved POA    PRINCIPAL PROBLEM:  Acute on chronic respiratory failure with hypoxia [J96.21] 06/10/2022 12/23/2022 Yes       Discharged Condition: stable    Disposition: Home-Health Care [facility]    Follow Up:   Follow-up Information     Eduardo Ruiz MD Follow up in 1 week(s).    Specialty: Family Medicine  Contact information:  [address] [place] [address]  [phone]             Guerline Higgins MD Follow up in 2 week(s).    Specialty: Hematology and Oncology  Contact information:  [address]  [address]  [facility] [address]  [phone]                       Patient Instructions:      Ambulatory referral/consult to Outpatient Case Management   Referral Priority: Routine Referral Type: Consultation   Referral Reason: Specialty Services Required   Number of Visits Requested: 1     Diet Cardiac     Notify your health care provider if you experience any of the following:  temperature >100.4     Notify your health care provider if you experience any of the following:  persistent nausea and vomiting or diarrhea     Notify your health care provider if you  experience any of the following:  severe uncontrolled pain     Notify your health care provider if you experience any of the following:  difficulty breathing or increased cough     Notify your health care provider if you experience any of the following:  persistent dizziness, light-headedness, or visual disturbances     Notify your health care provider if you experience any of the following:  increased confusion or weakness     Activity as tolerated           Pending Diagnostic Studies:     None         Medications:  Reconciled Home Medications:      Medication List      START taking these medications    metoprolol tartrate 50 MG tablet  Commonly known as: LOPRESSOR  Take 1 tablet (50 mg total) by mouth 3 (three) times daily.        CONTINUE taking these medications    albuterol 90 mcg/actuation inhaler  Commonly known as: VENTOLIN HFA  Inhale 2 puffs into the lungs every 6 (six) hours as needed for Wheezing. Rescue     albuterol-ipratropium 2.5 mg-0.5 mg/3 mL nebulizer solution  Commonly known as: DUO-NEB  Inhale contents of 1 vial (3 mLs) by nebulization every 4 (four) hours as needed for Wheezing or Shortness of Breath. Rescue     amLODIPine 10 MG tablet  Commonly known as: NORVASC  Take 1 tablet (10 mg total) by mouth once daily.     apixaban 5 mg Tab  Commonly known as: ELIQUIS  Take 1 tablet (5 mg total) by mouth 2 (two) times daily.     aspirin 81 MG Chew  Take 1 tablet (81 mg total) by mouth once daily.     atorvastatin 80 MG tablet  Commonly known as: LIPITOR  Take 1 tablet (80 mg total) by mouth once daily.     cilostazoL 100 MG Tab  Commonly known as: PLETAL  Take 1 tablet (100 mg total) by mouth 2 (two) times daily.     clopidogreL 75 mg tablet  Commonly known as: PLAVIX  Take 1 tablet (75 mg total) by mouth once daily.     dronabinoL 5 MG capsule  Commonly known as: MARINOL  Take 1 capsule (5 mg total) by mouth 2 (two) times daily before meals. for 14 days     furosemide 40 MG tablet  Commonly known as:  LASIX  Take 1 tablet (40 mg total) by mouth 2 (two) times a day.     GAVILAX 17 gram/dose powder  Generic drug: polyethylene glycol  Mix 1 capful (17 grams total) with a liquid and take by mouth once daily.     HYDROcodone-acetaminophen 7.5-325 mg per tablet  Commonly known as: NORCO  Take 1 tablet by mouth every 4 (four) hours as needed. for pain.     SPIRIVA WITH HANDIHALER 18 mcg inhalation capsule  Generic drug: tiotropium  Inhale 1 capsule (18 mcg total) into the lungs via handihaler once daily. Controller     VITAMIN C ENERGY BOOSTER 1,000 mg Pwep  Generic drug: ascorbic acid-multivit-min  Take 3,000 mg by mouth once daily. Patient takes 3,000 mg per day     WIXELA INHUB 100-50 mcg/dose diskus inhaler  Generic drug: fluticasone-salmeterol 100-50 mcg/dose  Inhale 1 puff into the lungs 2 (two) times daily. Controller        STOP taking these medications    rivaroxaban 20 mg Tab  Commonly known as: XARELTO            Indwelling Lines/Drains at time of discharge:   Lines/Drains/Airways     None                 Time spent on the discharge of patient: >30 minutes         Ant Martin MD  Department of Hospital Medicine  [facility]

## 2022-12-23 NOTE — NURSING
Monitor tech called to say Pt took off his continous Resp probe . Went into the room Probe is still on . I did some adjusting . Monitor tech says still nor showing . Will get a new probe.

## 2022-12-23 NOTE — PLAN OF CARE
12/23/22 1209   Medicare Message   Important Message from Medicare regarding Discharge Appeal Rights Given to patient/caregiver;Explained to patient/caregiver   Date IMM was signed 12/23/22   Time IMM was signed 1201      explained IMM to patient's spouse via phone.

## 2022-12-28 NOTE — PROGRESS NOTES
Health Maintenance Due   Topic     HIV Screening      Shingles Vaccine (1 of 2) hx chickenpox ; inform pt can get vaccine at pharmacy.    Colorectal Cancer Screening      Pneumococcal Vaccines (Age 0-64) (2 - PPSV23 if available, else PCV20)     COVID-19 Vaccine (3 - Booster for Moderna series)     PROSTATE-SPECIFIC ANTIGEN      Influenza Vaccine (1)

## 2022-12-31 NOTE — PROGRESS NOTES
Subjective:      Patient ID: Kashif Iverson is a 62 y.o. male.  Pt presents to clinic for hospital f/u, see course below. Was admitted for pneumonia, requiring increased oxygen. In clinic on 2L per NC today. Reports breathing is at baseline and he feels his energy has improved. Denies any acute complaints today.    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      Patient reports developing worsening shortness of breath and productive cough since he took his last chemotherapy dose on 12/13. He is on 2L O2 at home, and SpO2 readings were as low as 80%, and would not improved despite increasing the O2 to 4L. He has multiple admissions for similar presentation. No longer a smoker. In the ED, the patient was tachycardic, tachypnic and hypoxic requiring 12L O2. Labs showed leukocytosis (16.9), elevated d-dimer (1.94), hypokalemia (3.4), elevated BNP (355), elevated troponin (0.089), elevated lactic acid (2.3). CTA showed no PE, presistent and worsening occlusion of left upper lobe bronchus with superimposed interstitial and alveolar infiltrates., increased ground glass opacities, moderate to large right pleural effusion, small left pleural effusion and other chronic stable changes.  . He was given fluids, vancomycin, zosyn, and DuoNeb x1. The patient was admitted for further management.      Hospital Course:   Lung Cancer with superimposed Pneumonia. Staph-like organisms growing in sputum, continued broad coverage until results.  Pulmonology consulted, appreciate assessment, will ask IR to tap the new pleural effusion for distinguish meant between malignant  effusion, cardiac/HF effusion, or infection- thoracentesis labs ordered. Multiple hospital admissions the last few months, poor medical understanding. Palliative care consult placed for 12/19. Thoracentesis done on 12/19. Apixaban and Plavix received early 12/18 morning, held for thoracentesis thereafter, restart after completed. Labs trended to normal. O2 requirement reduced from 12L to 8L to 5L. Mentation improved.  Pleural fluid chemistry c/w exudative.  No bacteria on gram stain.  cxr with improvement of right lung. Vanc/Ceft/Doxy stopped and transitioned to oral Levaquin.  Completed course of ABx's during hospital stay.  He has remained afebrile and hemodynamically stable.  Currently on home 2L oxygen per NC and stable.  He will be discharged home with HH to follow up with PCP and Oncology    Review of Systems   Constitutional:  Positive for fatigue. Negative for activity change, appetite change, fever and unexpected weight change.   HENT:  Negative for nasal congestion, ear pain, postnasal drip, rhinorrhea, sneezing, sore throat and voice change.    Eyes:  Negative for pain and visual disturbance.   Respiratory:  Negative for cough, chest tightness and shortness of breath.    Cardiovascular:  Negative for chest pain, palpitations and leg swelling.   Gastrointestinal:  Negative for abdominal pain, constipation, diarrhea, nausea and vomiting.   Endocrine: Negative.    Genitourinary:  Negative for difficulty urinating.   Musculoskeletal:  Negative for arthralgias, gait problem and myalgias.   Integumentary:  Negative for rash.   Allergic/Immunologic: Negative.    Neurological:  Negative for speech difficulty and headaches.   Hematological: Negative.    Psychiatric/Behavioral: Negative.     All other systems reviewed and are negative.      Objective:     Vitals:    12/28/22 1038   BP: 122/74   BP Location: Left arm   Patient Position: Sitting   BP Method: Small (Manual)   Pulse: 79   Resp: 16   Temp: 98.2 °F (36.8  "°C)   TempSrc: Oral   SpO2: 95%   Weight: 82.3 kg (181 lb 7 oz)   Height: 5' 10" (1.778 m)     Physical Exam  Vitals and nursing note reviewed.   Constitutional:       General: He is not in acute distress.     Appearance: Normal appearance. He is well-developed, well-groomed and normal weight. He is ill-appearing.      Interventions: Nasal cannula in place.      Comments: 2L O2 NC   HENT:      Head: Normocephalic and atraumatic.      Right Ear: External ear normal.      Left Ear: External ear normal.      Nose: Nose normal.      Mouth/Throat:      Lips: Pink.      Mouth: Mucous membranes are moist.   Eyes:      General: Lids are normal. Vision grossly intact. Gaze aligned appropriately.      Conjunctiva/sclera: Conjunctivae normal.      Pupils: Pupils are equal, round, and reactive to light.   Neck:      Trachea: Phonation normal.   Cardiovascular:      Rate and Rhythm: Normal rate and regular rhythm.      Heart sounds: Murmur heard.   Pulmonary:      Effort: Pulmonary effort is normal. No accessory muscle usage or respiratory distress.      Breath sounds: Normal air entry. No transmitted upper airway sounds. Examination of the right-lower field reveals rales. Examination of the left-lower field reveals rales. Decreased breath sounds (diffuse with shallow breathing), rhonchi (diffuse) and rales present. No wheezing.   Abdominal:      General: Abdomen is flat. Bowel sounds are normal. There is no distension.      Palpations: Abdomen is soft.      Tenderness: There is no abdominal tenderness.   Musculoskeletal:      Cervical back: Neck supple.      Right lower leg: No edema.      Left lower leg: No edema.   Skin:     General: Skin is cool and dry.      Capillary Refill: Capillary refill takes more than 3 seconds.      Coloration: Skin is cyanotic (peripheral, toes with clubbing) and pale.      Findings: No rash.   Neurological:      General: No focal deficit present.      Mental Status: He is alert and oriented to " person, place, and time. Mental status is at baseline.      Sensory: Sensation is intact.      Motor: Motor function is intact.      Coordination: Coordination is intact.      Gait: Gait is intact.   Psychiatric:         Attention and Perception: Attention and perception normal.         Mood and Affect: Mood and affect normal.         Speech: Speech normal.         Behavior: Behavior normal. Behavior is cooperative.         Thought Content: Thought content normal.         Cognition and Memory: Cognition and memory normal.         Judgment: Judgment normal.     Assessment and Plan:     1. Hospital discharge follow-up  Breathing at baseline, compliant with oxygen supplementation, is scheduled for chemo on 12/30, continue oncology plan            FABIENNE Lawler, FNP-C  Family/Internal Medicine  [facility]

## 2023-01-01 ENCOUNTER — TELEPHONE (OUTPATIENT)
Dept: FAMILY MEDICINE | Facility: CLINIC | Age: 63
End: 2023-01-01
Payer: MEDICARE

## 2023-01-01 ENCOUNTER — TELEPHONE (OUTPATIENT)
Dept: FAMILY MEDICINE | Facility: CLINIC | Age: 63
End: 2023-01-01

## 2023-01-01 ENCOUNTER — PATIENT MESSAGE (OUTPATIENT)
Dept: FAMILY MEDICINE | Facility: CLINIC | Age: 63
End: 2023-01-01
Payer: MEDICARE

## 2023-01-01 ENCOUNTER — OUTPATIENT CASE MANAGEMENT (OUTPATIENT)
Dept: ADMINISTRATIVE | Facility: OTHER | Age: 63
End: 2023-01-01
Payer: MEDICARE

## 2023-01-01 ENCOUNTER — PATIENT MESSAGE (OUTPATIENT)
Dept: ADMINISTRATIVE | Facility: OTHER | Age: 63
End: 2023-01-01
Payer: MEDICARE

## 2023-01-01 ENCOUNTER — CLINICAL SUPPORT (OUTPATIENT)
Dept: ENDOSCOPY | Facility: HOSPITAL | Age: 63
End: 2023-01-01
Payer: MEDICARE

## 2023-01-01 ENCOUNTER — ANESTHESIA EVENT (OUTPATIENT)
Dept: SURGERY | Facility: HOSPITAL | Age: 63
DRG: 871 | End: 2023-01-01
Payer: MEDICARE

## 2023-01-01 ENCOUNTER — PATIENT OUTREACH (OUTPATIENT)
Dept: ADMINISTRATIVE | Facility: OTHER | Age: 63
End: 2023-01-01
Payer: MEDICARE

## 2023-01-01 ENCOUNTER — TELEPHONE (OUTPATIENT)
Dept: HOME HEALTH SERVICES | Facility: CLINIC | Age: 63
End: 2023-01-01

## 2023-01-01 ENCOUNTER — PATIENT OUTREACH (OUTPATIENT)
Dept: ADMINISTRATIVE | Facility: CLINIC | Age: 63
End: 2023-01-01
Payer: MEDICARE

## 2023-01-01 ENCOUNTER — HOSPITAL ENCOUNTER (INPATIENT)
Facility: HOSPITAL | Age: 63
LOS: 9 days | DRG: 543 | End: 2023-08-30
Attending: EMERGENCY MEDICINE | Admitting: HOSPITALIST
Payer: MEDICARE

## 2023-01-01 ENCOUNTER — HOSPITAL ENCOUNTER (INPATIENT)
Facility: HOSPITAL | Age: 63
LOS: 3 days | Discharge: HOME | DRG: 189 | End: 2023-03-03
Attending: STUDENT IN AN ORGANIZED HEALTH CARE EDUCATION/TRAINING PROGRAM | Admitting: STUDENT IN AN ORGANIZED HEALTH CARE EDUCATION/TRAINING PROGRAM
Payer: MEDICARE

## 2023-01-01 ENCOUNTER — OFFICE VISIT (OUTPATIENT)
Dept: FAMILY MEDICINE | Facility: CLINIC | Age: 63
End: 2023-01-01
Payer: MEDICARE

## 2023-01-01 ENCOUNTER — PATIENT MESSAGE (OUTPATIENT)
Dept: FAMILY MEDICINE | Facility: CLINIC | Age: 63
End: 2023-01-01

## 2023-01-01 ENCOUNTER — TELEPHONE (OUTPATIENT)
Dept: HOME HEALTH SERVICES | Facility: CLINIC | Age: 63
End: 2023-01-01
Payer: MEDICARE

## 2023-01-01 ENCOUNTER — INFUSION (OUTPATIENT)
Dept: INFUSION THERAPY | Facility: HOSPITAL | Age: 63
End: 2023-01-01
Attending: STUDENT IN AN ORGANIZED HEALTH CARE EDUCATION/TRAINING PROGRAM
Payer: MEDICARE

## 2023-01-01 ENCOUNTER — HOSPITAL ENCOUNTER (INPATIENT)
Facility: HOSPITAL | Age: 63
LOS: 7 days | Discharge: NURSING, REHAB OR LONG-TERM CARE | DRG: 871 | End: 2023-03-23
Attending: STUDENT IN AN ORGANIZED HEALTH CARE EDUCATION/TRAINING PROGRAM | Admitting: HOSPITALIST
Payer: MEDICARE

## 2023-01-01 ENCOUNTER — TELEPHONE (OUTPATIENT)
Dept: ENDOSCOPY | Facility: HOSPITAL | Age: 63
End: 2023-01-01

## 2023-01-01 ENCOUNTER — PES CALL (OUTPATIENT)
Dept: ADMINISTRATIVE | Facility: CLINIC | Age: 63
End: 2023-01-01
Payer: MEDICARE

## 2023-01-01 ENCOUNTER — HOSPITAL ENCOUNTER (INPATIENT)
Facility: HOSPITAL | Age: 63
LOS: 4 days | Discharge: HOME | DRG: 189 | End: 2023-02-01
Attending: EMERGENCY MEDICINE | Admitting: STUDENT IN AN ORGANIZED HEALTH CARE EDUCATION/TRAINING PROGRAM
Payer: MEDICARE

## 2023-01-01 ENCOUNTER — OFFICE VISIT (OUTPATIENT)
Dept: HEMATOLOGY/ONCOLOGY | Facility: CLINIC | Age: 63
End: 2023-01-01
Payer: MEDICARE

## 2023-01-01 ENCOUNTER — OFFICE VISIT (OUTPATIENT)
Dept: HEMATOLOGY/ONCOLOGY | Facility: CLINIC | Age: 63
DRG: 189 | End: 2023-01-01
Payer: MEDICARE

## 2023-01-01 ENCOUNTER — HOSPITAL ENCOUNTER (OUTPATIENT)
Dept: RADIOLOGY | Facility: HOSPITAL | Age: 63
Discharge: HOME | End: 2023-02-17
Attending: STUDENT IN AN ORGANIZED HEALTH CARE EDUCATION/TRAINING PROGRAM
Payer: MEDICARE

## 2023-01-01 ENCOUNTER — ANESTHESIA (OUTPATIENT)
Dept: SURGERY | Facility: HOSPITAL | Age: 63
DRG: 871 | End: 2023-01-01
Payer: MEDICARE

## 2023-01-01 VITALS
RESPIRATION RATE: 16 BRPM | WEIGHT: 185.88 LBS | DIASTOLIC BLOOD PRESSURE: 80 MMHG | BODY MASS INDEX: 26.61 KG/M2 | HEIGHT: 70 IN | SYSTOLIC BLOOD PRESSURE: 136 MMHG | HEART RATE: 87 BPM | OXYGEN SATURATION: 91 % | TEMPERATURE: 88 F

## 2023-01-01 VITALS
BODY MASS INDEX: 25 KG/M2 | TEMPERATURE: 98 F | DIASTOLIC BLOOD PRESSURE: 70 MMHG | WEIGHT: 174.63 LBS | SYSTOLIC BLOOD PRESSURE: 112 MMHG | HEIGHT: 70 IN | OXYGEN SATURATION: 94 % | RESPIRATION RATE: 26 BRPM | HEART RATE: 71 BPM

## 2023-01-01 VITALS
HEIGHT: 70 IN | OXYGEN SATURATION: 90 % | SYSTOLIC BLOOD PRESSURE: 155 MMHG | WEIGHT: 183.88 LBS | HEART RATE: 74 BPM | BODY MASS INDEX: 26.33 KG/M2 | DIASTOLIC BLOOD PRESSURE: 94 MMHG

## 2023-01-01 VITALS
SYSTOLIC BLOOD PRESSURE: 116 MMHG | RESPIRATION RATE: 18 BRPM | DIASTOLIC BLOOD PRESSURE: 79 MMHG | BODY MASS INDEX: 25.25 KG/M2 | TEMPERATURE: 98 F | HEART RATE: 80 BPM | WEIGHT: 176.38 LBS | HEIGHT: 70 IN | OXYGEN SATURATION: 92 %

## 2023-01-01 VITALS
DIASTOLIC BLOOD PRESSURE: 73 MMHG | WEIGHT: 183 LBS | HEIGHT: 70 IN | OXYGEN SATURATION: 79 % | HEART RATE: 83 BPM | BODY MASS INDEX: 26.2 KG/M2 | SYSTOLIC BLOOD PRESSURE: 119 MMHG

## 2023-01-01 VITALS
HEART RATE: 72 BPM | SYSTOLIC BLOOD PRESSURE: 125 MMHG | HEIGHT: 70 IN | WEIGHT: 186 LBS | OXYGEN SATURATION: 95 % | TEMPERATURE: 98 F | RESPIRATION RATE: 17 BRPM | DIASTOLIC BLOOD PRESSURE: 75 MMHG | BODY MASS INDEX: 26.63 KG/M2

## 2023-01-01 VITALS
DIASTOLIC BLOOD PRESSURE: 86 MMHG | HEART RATE: 101 BPM | SYSTOLIC BLOOD PRESSURE: 145 MMHG | BODY MASS INDEX: 25.15 KG/M2 | OXYGEN SATURATION: 91 % | HEIGHT: 70 IN | WEIGHT: 175.69 LBS | TEMPERATURE: 98 F | RESPIRATION RATE: 18 BRPM

## 2023-01-01 VITALS
WEIGHT: 181.38 LBS | DIASTOLIC BLOOD PRESSURE: 75 MMHG | SYSTOLIC BLOOD PRESSURE: 124 MMHG | HEART RATE: 75 BPM | TEMPERATURE: 98 F | OXYGEN SATURATION: 90 % | BODY MASS INDEX: 26.03 KG/M2

## 2023-01-01 VITALS
DIASTOLIC BLOOD PRESSURE: 80 MMHG | WEIGHT: 192.88 LBS | HEART RATE: 82 BPM | TEMPERATURE: 98 F | SYSTOLIC BLOOD PRESSURE: 120 MMHG | OXYGEN SATURATION: 97 % | HEIGHT: 70 IN | BODY MASS INDEX: 27.61 KG/M2

## 2023-01-01 VITALS
HEIGHT: 70 IN | BODY MASS INDEX: 20.99 KG/M2 | TEMPERATURE: 99 F | HEART RATE: 126 BPM | OXYGEN SATURATION: 89 % | DIASTOLIC BLOOD PRESSURE: 70 MMHG | WEIGHT: 146.63 LBS | RESPIRATION RATE: 26 BRPM | SYSTOLIC BLOOD PRESSURE: 119 MMHG

## 2023-01-01 VITALS
BODY MASS INDEX: 25.73 KG/M2 | HEART RATE: 98 BPM | OXYGEN SATURATION: 96 % | DIASTOLIC BLOOD PRESSURE: 74 MMHG | WEIGHT: 179.69 LBS | TEMPERATURE: 98 F | SYSTOLIC BLOOD PRESSURE: 118 MMHG | HEIGHT: 70 IN | RESPIRATION RATE: 16 BRPM

## 2023-01-01 DIAGNOSIS — J90 PLEURAL EFFUSION: ICD-10-CM

## 2023-01-01 DIAGNOSIS — C79.51 SECONDARY MALIGNANT NEOPLASM OF BONE: ICD-10-CM

## 2023-01-01 DIAGNOSIS — R09.02 HYPOXIA: ICD-10-CM

## 2023-01-01 DIAGNOSIS — R06.02 SHORTNESS OF BREATH: ICD-10-CM

## 2023-01-01 DIAGNOSIS — C34.02 LUNG CANCER, MAIN BRONCHUS, LEFT: Primary | ICD-10-CM

## 2023-01-01 DIAGNOSIS — R00.0 TACHYCARDIA: ICD-10-CM

## 2023-01-01 DIAGNOSIS — A41.9 SEPSIS, DUE TO UNSPECIFIED ORGANISM, UNSPECIFIED WHETHER ACUTE ORGAN DYSFUNCTION PRESENT: ICD-10-CM

## 2023-01-01 DIAGNOSIS — Z09 HOSPITAL DISCHARGE FOLLOW-UP: Primary | ICD-10-CM

## 2023-01-01 DIAGNOSIS — C34.02 LUNG CANCER, MAIN BRONCHUS, LEFT: Chronic | ICD-10-CM

## 2023-01-01 DIAGNOSIS — Z99.81 DEPENDENCE ON SUPPLEMENTAL OXYGEN: ICD-10-CM

## 2023-01-01 DIAGNOSIS — M79.89 LEG SWELLING: ICD-10-CM

## 2023-01-01 DIAGNOSIS — J96.01 ACUTE RESPIRATORY FAILURE WITH HYPOXIA: ICD-10-CM

## 2023-01-01 DIAGNOSIS — C34.90 MALIGNANT NEOPLASM OF UNSPECIFIED PART OF UNSPECIFIED BRONCHUS OR LUNG: ICD-10-CM

## 2023-01-01 DIAGNOSIS — I10 PRIMARY HYPERTENSION: ICD-10-CM

## 2023-01-01 DIAGNOSIS — G89.3 CANCER ASSOCIATED PAIN: ICD-10-CM

## 2023-01-01 DIAGNOSIS — D84.821 IMMUNODEFICIENCY DUE TO DRUGS: ICD-10-CM

## 2023-01-01 DIAGNOSIS — C34.90 MALIGNANT NEOPLASM OF UNSPECIFIED PART OF UNSPECIFIED BRONCHUS OR LUNG: Primary | ICD-10-CM

## 2023-01-01 DIAGNOSIS — Z51.5 END OF LIFE CARE: Primary | ICD-10-CM

## 2023-01-01 DIAGNOSIS — R07.9 CHEST PAIN: ICD-10-CM
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
## 2023-01-01 DIAGNOSIS — R91.8 MULTIPLE PULMONARY NODULES: ICD-10-CM

## 2023-01-01 DIAGNOSIS — I10 BENIGN ESSENTIAL HTN: ICD-10-CM

## 2023-01-01 DIAGNOSIS — Z71.89 ADVANCED CARE PLANNING/COUNSELING DISCUSSION: ICD-10-CM

## 2023-01-01 DIAGNOSIS — J96.21 ACUTE ON CHRONIC RESPIRATORY FAILURE WITH HYPOXIA: ICD-10-CM

## 2023-01-01 DIAGNOSIS — J44.9 CHRONIC OBSTRUCTIVE PULMONARY DISEASE, UNSPECIFIED COPD TYPE: ICD-10-CM

## 2023-01-01 DIAGNOSIS — G83.24 PARALYSIS OF LEFT UPPER EXTREMITY: ICD-10-CM

## 2023-01-01 DIAGNOSIS — C34.90 SQUAMOUS CELL CARCINOMA OF LUNG, UNSPECIFIED LATERALITY: ICD-10-CM

## 2023-01-01 DIAGNOSIS — I10 BENIGN ESSENTIAL HTN: Primary | ICD-10-CM

## 2023-01-01 DIAGNOSIS — Z79.899 IMMUNODEFICIENCY DUE TO DRUGS: ICD-10-CM

## 2023-01-01 DIAGNOSIS — I73.9 PAD (PERIPHERAL ARTERY DISEASE): ICD-10-CM

## 2023-01-01 DIAGNOSIS — Z09 FOLLOW-UP EXAM: ICD-10-CM

## 2023-01-01 DIAGNOSIS — A49.8 ACINETOBACTER LWOFFI INFECTION: ICD-10-CM

## 2023-01-01 DIAGNOSIS — G93.41 ENCEPHALOPATHY, METABOLIC: ICD-10-CM

## 2023-01-01 DIAGNOSIS — E87.6 HYPOKALEMIA: ICD-10-CM

## 2023-01-01 DIAGNOSIS — I50.9 ACUTE ON CHRONIC HEART FAILURE, UNSPECIFIED HEART FAILURE TYPE: Primary | ICD-10-CM

## 2023-01-01 DIAGNOSIS — J11.1 INFLUENZA: Primary | ICD-10-CM

## 2023-01-01 DIAGNOSIS — E83.42 HYPOMAGNESEMIA: ICD-10-CM

## 2023-01-01 DIAGNOSIS — I73.9 PERIPHERAL ARTERIAL DISEASE: ICD-10-CM

## 2023-01-01 DIAGNOSIS — Z12.11 ENCOUNTER FOR SCREENING COLONOSCOPY FOR NON-HIGH-RISK PATIENT: ICD-10-CM

## 2023-01-01 DIAGNOSIS — Z51.5 END OF LIFE CARE: ICD-10-CM

## 2023-01-01 DIAGNOSIS — C80.1 BRACHIAL PLEXOPATHY DUE TO MALIGNANT NEOPLASM: Primary | ICD-10-CM

## 2023-01-01 DIAGNOSIS — J96.01 ACUTE HYPOXEMIC RESPIRATORY FAILURE: Primary | ICD-10-CM

## 2023-01-01 DIAGNOSIS — R07.9 CHEST PAIN: Primary | ICD-10-CM

## 2023-01-01 DIAGNOSIS — C34.02 LUNG CANCER, MAIN BRONCHUS, LEFT: ICD-10-CM

## 2023-01-01 DIAGNOSIS — Z71.2 ENCOUNTER TO DISCUSS TEST RESULTS: ICD-10-CM

## 2023-01-01 DIAGNOSIS — G54.0 BRACHIAL PLEXOPATHY DUE TO MALIGNANT NEOPLASM: Primary | ICD-10-CM

## 2023-01-01 DIAGNOSIS — A41.9 SEPSIS: ICD-10-CM

## 2023-01-01 DIAGNOSIS — R63.0 POOR APPETITE: ICD-10-CM

## 2023-01-01 DIAGNOSIS — M79.89 SWELLING OF RIGHT UPPER EXTREMITY: ICD-10-CM

## 2023-01-01 DIAGNOSIS — D63.8 ANEMIA, CHRONIC DISEASE: ICD-10-CM

## 2023-01-01 LAB
ALBUMIN SERPL BCP-MCNC: 2.2 G/DL (ref 3.5–5.2)
ALBUMIN SERPL BCP-MCNC: 2.3 G/DL (ref 3.5–5.2)
ALBUMIN SERPL BCP-MCNC: 2.3 G/DL (ref 3.5–5.2)
ALBUMIN SERPL BCP-MCNC: 2.4 G/DL (ref 3.5–5.2)
ALBUMIN SERPL BCP-MCNC: 2.6 G/DL (ref 3.5–5.2)
ALBUMIN SERPL BCP-MCNC: 3.2 G/DL (ref 3.5–5.2)
ALBUMIN SERPL BCP-MCNC: 3.2 G/DL (ref 3.5–5.2)
ALBUMIN SERPL BCP-MCNC: 3.3 G/DL (ref 3.5–5.2)
ALBUMIN SERPL BCP-MCNC: 3.3 G/DL (ref 3.5–5.2)
ALBUMIN SERPL BCP-MCNC: 3.5 G/DL (ref 3.5–5.2)
ALLENS TEST: ABNORMAL
ALP SERPL-CCNC: 102 U/L (ref 55–135)
ALP SERPL-CCNC: 104 U/L (ref 55–135)
ALP SERPL-CCNC: 109 U/L (ref 55–135)
ALP SERPL-CCNC: 111 U/L (ref 55–135)
ALP SERPL-CCNC: 116 U/L (ref 55–135)
ALP SERPL-CCNC: 116 U/L (ref 55–135)
ALP SERPL-CCNC: 118 U/L (ref 55–135)
ALP SERPL-CCNC: 119 U/L (ref 55–135)
ALP SERPL-CCNC: 120 U/L (ref 55–135)
ALP SERPL-CCNC: 122 U/L (ref 55–135)
ALP SERPL-CCNC: 130 U/L (ref 55–135)
ALP SERPL-CCNC: 137 U/L (ref 55–135)
ALP SERPL-CCNC: 84 U/L (ref 55–135)
ALP SERPL-CCNC: 89 U/L (ref 55–135)
ALT SERPL W/O P-5'-P-CCNC: 10 U/L (ref 10–44)
ALT SERPL W/O P-5'-P-CCNC: 11 U/L (ref 10–44)
ALT SERPL W/O P-5'-P-CCNC: 11 U/L (ref 10–44)
ALT SERPL W/O P-5'-P-CCNC: 12 U/L (ref 10–44)
ALT SERPL W/O P-5'-P-CCNC: 13 U/L (ref 10–44)
ALT SERPL W/O P-5'-P-CCNC: 13 U/L (ref 10–44)
ALT SERPL W/O P-5'-P-CCNC: 15 U/L (ref 10–44)
ALT SERPL W/O P-5'-P-CCNC: 16 U/L (ref 10–44)
ALT SERPL W/O P-5'-P-CCNC: 9 U/L (ref 10–44)
AMMONIA PLAS-SCNC: 24 UMOL/L (ref 10–50)
ANION GAP SERPL CALC-SCNC: 10 MMOL/L (ref 8–16)
ANION GAP SERPL CALC-SCNC: 11 MMOL/L (ref 8–16)
ANION GAP SERPL CALC-SCNC: 11 MMOL/L (ref 8–16)
ANION GAP SERPL CALC-SCNC: 13 MMOL/L (ref 8–16)
ANION GAP SERPL CALC-SCNC: 14 MMOL/L (ref 8–16)
ANION GAP SERPL CALC-SCNC: 15 MMOL/L (ref 8–16)
ANION GAP SERPL CALC-SCNC: 16 MMOL/L (ref 8–16)
ANION GAP SERPL CALC-SCNC: 16 MMOL/L (ref 8–16)
ANION GAP SERPL CALC-SCNC: 18 MMOL/L (ref 8–16)
ANION GAP SERPL CALC-SCNC: 5 MMOL/L (ref 8–16)
ANION GAP SERPL CALC-SCNC: 7 MMOL/L (ref 8–16)
ANION GAP SERPL CALC-SCNC: 8 MMOL/L (ref 8–16)
ANION GAP SERPL CALC-SCNC: 9 MMOL/L (ref 8–16)
ANION GAP SERPL CALC-SCNC: 9 MMOL/L (ref 8–16)
APTT BLDCRRT: 25.3 SEC (ref 21–32)
APTT PPP: 30.2 SEC (ref 21–32)
AST SERPL-CCNC: 11 U/L (ref 10–40)
AST SERPL-CCNC: 12 U/L (ref 10–40)
AST SERPL-CCNC: 12 U/L (ref 10–40)
AST SERPL-CCNC: 14 U/L (ref 10–40)
AST SERPL-CCNC: 15 U/L (ref 10–40)
AST SERPL-CCNC: 15 U/L (ref 10–40)
AST SERPL-CCNC: 18 U/L (ref 10–40)
AST SERPL-CCNC: 19 U/L (ref 10–40)
AST SERPL-CCNC: 21 U/L (ref 10–40)
AST SERPL-CCNC: 34 U/L (ref 10–40)
AST SERPL-CCNC: 35 U/L (ref 10–40)
AST SERPL-CCNC: 36 U/L (ref 10–40)
AST SERPL-CCNC: 38 U/L (ref 10–40)
AST SERPL-CCNC: 51 U/L (ref 10–40)
BACTERIA BLD CULT: ABNORMAL
BACTERIA BLD CULT: NORMAL
BASOPHILS # BLD AUTO: 0.01 K/UL (ref 0–0.2)
BASOPHILS # BLD AUTO: 0.03 K/UL (ref 0–0.2)
BASOPHILS # BLD AUTO: 0.04 K/UL (ref 0–0.2)
BASOPHILS # BLD AUTO: 0.04 K/UL (ref 0–0.2)
BASOPHILS # BLD AUTO: 0.05 K/UL (ref 0–0.2)
BASOPHILS # BLD AUTO: 0.05 K/UL (ref 0–0.2)
BASOPHILS # BLD AUTO: 0.06 K/UL (ref 0–0.2)
BASOPHILS # BLD AUTO: 0.06 K/UL (ref 0–0.2)
BASOPHILS # BLD AUTO: 0.07 K/UL (ref 0–0.2)
BASOPHILS # BLD AUTO: 0.08 K/UL (ref 0–0.2)
BASOPHILS # BLD AUTO: 0.09 K/UL (ref 0–0.2)
BASOPHILS # BLD AUTO: 0.09 K/UL (ref 0–0.2)
BASOPHILS # BLD AUTO: 0.1 K/UL (ref 0–0.2)
BASOPHILS # BLD AUTO: 0.11 K/UL (ref 0–0.2)
BASOPHILS # BLD AUTO: 0.13 K/UL (ref 0–0.2)
BASOPHILS NFR BLD: 0.2 % (ref 0–1.9)
BASOPHILS NFR BLD: 0.3 % (ref 0–1.9)
BASOPHILS NFR BLD: 0.4 % (ref 0–1.9)
BASOPHILS NFR BLD: 0.4 % (ref 0–1.9)
BASOPHILS NFR BLD: 0.5 % (ref 0–1.9)
BASOPHILS NFR BLD: 0.6 % (ref 0–1.9)
BASOPHILS NFR BLD: 0.7 % (ref 0–1.9)
BASOPHILS NFR BLD: 0.7 % (ref 0–1.9)
BASOPHILS NFR BLD: 0.8 % (ref 0–1.9)
BASOPHILS NFR BLD: 0.8 % (ref 0–1.9)
BASOPHILS NFR BLD: 1 % (ref 0–1.9)
BASOPHILS NFR BLD: 1.2 % (ref 0–1.9)
BASOPHILS NFR BLD: 1.4 % (ref 0–1.9)
BASOPHILS NFR BLD: 1.9 % (ref 0–1.9)
BILIRUB DIRECT SERPL-MCNC: 0.3 MG/DL (ref 0.1–0.3)
BILIRUB DIRECT SERPL-MCNC: 0.5 MG/DL (ref 0.1–0.3)
BILIRUB SERPL-MCNC: 0.5 MG/DL (ref 0.1–1)
BILIRUB SERPL-MCNC: 0.6 MG/DL (ref 0.1–1)
BILIRUB SERPL-MCNC: 0.7 MG/DL (ref 0.1–1)
BILIRUB SERPL-MCNC: 0.8 MG/DL (ref 0.1–1)
BILIRUB SERPL-MCNC: 0.9 MG/DL (ref 0.1–1)
BILIRUB SERPL-MCNC: 0.9 MG/DL (ref 0.1–1)
BILIRUB SERPL-MCNC: 1 MG/DL (ref 0.1–1)
BILIRUB SERPL-MCNC: 1.1 MG/DL (ref 0.1–1)
BILIRUB SERPL-MCNC: 1.1 MG/DL (ref 0.1–1)
BILIRUB SERPL-MCNC: 1.2 MG/DL (ref 0.1–1)
BILIRUB UR QL STRIP: NEGATIVE
BNP SERPL-MCNC: 108 PG/ML (ref 0–99)
BNP SERPL-MCNC: 169 PG/ML (ref 0–99)
BNP SERPL-MCNC: 272 PG/ML (ref 0–99)
BNP SERPL-MCNC: 364 PG/ML (ref 0–99)
BUN SERPL-MCNC: 10 MG/DL (ref 8–23)
BUN SERPL-MCNC: 11 MG/DL (ref 8–23)
BUN SERPL-MCNC: 11 MG/DL (ref 8–23)
BUN SERPL-MCNC: 12 MG/DL (ref 8–23)
BUN SERPL-MCNC: 12 MG/DL (ref 8–23)
BUN SERPL-MCNC: 13 MG/DL (ref 8–23)
BUN SERPL-MCNC: 14 MG/DL (ref 8–23)
BUN SERPL-MCNC: 14 MG/DL (ref 8–23)
BUN SERPL-MCNC: 15 MG/DL (ref 8–23)
BUN SERPL-MCNC: 16 MG/DL (ref 6–30)
BUN SERPL-MCNC: 16 MG/DL (ref 8–23)
BUN SERPL-MCNC: 17 MG/DL (ref 8–23)
BUN SERPL-MCNC: 21 MG/DL (ref 6–30)
BUN SERPL-MCNC: 6 MG/DL (ref 8–23)
BUN SERPL-MCNC: 7 MG/DL (ref 8–23)
BUN SERPL-MCNC: 9 MG/DL (ref 8–23)
CALCIUM SERPL-MCNC: 7.3 MG/DL (ref 8.7–10.5)
CALCIUM SERPL-MCNC: 7.8 MG/DL (ref 8.7–10.5)
CALCIUM SERPL-MCNC: 8.2 MG/DL (ref 8.7–10.5)
CALCIUM SERPL-MCNC: 8.4 MG/DL (ref 8.7–10.5)
CALCIUM SERPL-MCNC: 8.4 MG/DL (ref 8.7–10.5)
CALCIUM SERPL-MCNC: 8.5 MG/DL (ref 8.7–10.5)
CALCIUM SERPL-MCNC: 8.6 MG/DL (ref 8.7–10.5)
CALCIUM SERPL-MCNC: 8.7 MG/DL (ref 8.7–10.5)
CALCIUM SERPL-MCNC: 8.7 MG/DL (ref 8.7–10.5)
CALCIUM SERPL-MCNC: 8.8 MG/DL (ref 8.7–10.5)
CALCIUM SERPL-MCNC: 9 MG/DL (ref 8.7–10.5)
CALCIUM SERPL-MCNC: 9.1 MG/DL (ref 8.7–10.5)
CALCIUM SERPL-MCNC: 9.2 MG/DL (ref 8.7–10.5)
CALCIUM SERPL-MCNC: 9.2 MG/DL (ref 8.7–10.5)
CALCIUM SERPL-MCNC: 9.3 MG/DL (ref 8.7–10.5)
CALCIUM SERPL-MCNC: 9.7 MG/DL (ref 8.7–10.5)
CALCIUM SERPL-MCNC: 9.8 MG/DL (ref 8.7–10.5)
CHLORIDE SERPL-SCNC: 100 MMOL/L (ref 95–110)
CHLORIDE SERPL-SCNC: 101 MMOL/L (ref 95–110)
CHLORIDE SERPL-SCNC: 102 MMOL/L (ref 95–110)
CHLORIDE SERPL-SCNC: 103 MMOL/L (ref 95–110)
CHLORIDE SERPL-SCNC: 103 MMOL/L (ref 95–110)
CHLORIDE SERPL-SCNC: 104 MMOL/L (ref 95–110)
CHLORIDE SERPL-SCNC: 105 MMOL/L (ref 95–110)
CHLORIDE SERPL-SCNC: 86 MMOL/L (ref 95–110)
CHLORIDE SERPL-SCNC: 87 MMOL/L (ref 95–110)
CHLORIDE SERPL-SCNC: 91 MMOL/L (ref 95–110)
CHLORIDE SERPL-SCNC: 91 MMOL/L (ref 95–110)
CHLORIDE SERPL-SCNC: 96 MMOL/L (ref 95–110)
CHLORIDE SERPL-SCNC: 97 MMOL/L (ref 95–110)
CHLORIDE SERPL-SCNC: 97 MMOL/L (ref 95–110)
CHLORIDE SERPL-SCNC: 98 MMOL/L (ref 95–110)
CLARITY UR: CLEAR
CO2 SERPL-SCNC: 18 MMOL/L (ref 23–29)
CO2 SERPL-SCNC: 22 MMOL/L (ref 23–29)
CO2 SERPL-SCNC: 23 MMOL/L (ref 23–29)
CO2 SERPL-SCNC: 24 MMOL/L (ref 23–29)
CO2 SERPL-SCNC: 24 MMOL/L (ref 23–29)
CO2 SERPL-SCNC: 25 MMOL/L (ref 23–29)
CO2 SERPL-SCNC: 26 MMOL/L (ref 23–29)
CO2 SERPL-SCNC: 27 MMOL/L (ref 23–29)
CO2 SERPL-SCNC: 28 MMOL/L (ref 23–29)
CO2 SERPL-SCNC: 29 MMOL/L (ref 23–29)
CO2 SERPL-SCNC: 30 MMOL/L (ref 23–29)
CO2 SERPL-SCNC: 30 MMOL/L (ref 23–29)
CO2 SERPL-SCNC: 32 MMOL/L (ref 23–29)
CO2 SERPL-SCNC: 35 MMOL/L (ref 23–29)
COLOR UR: YELLOW
CREAT SERPL-MCNC: 0.6 MG/DL (ref 0.5–1.4)
CREAT SERPL-MCNC: 0.6 MG/DL (ref 0.5–1.4)
CREAT SERPL-MCNC: 0.7 MG/DL (ref 0.5–1.4)
CREAT SERPL-MCNC: 0.7 MG/DL (ref 0.5–1.4)
CREAT SERPL-MCNC: 0.8 MG/DL (ref 0.5–1.4)
CREAT SERPL-MCNC: 0.9 MG/DL (ref 0.5–1.4)
CREAT SERPL-MCNC: 1 MG/DL (ref 0.5–1.4)
CREAT SERPL-MCNC: 1.1 MG/DL (ref 0.5–1.4)
CREAT SERPL-MCNC: 1.1 MG/DL (ref 0.5–1.4)
CREAT SERPL-MCNC: 1.2 MG/DL (ref 0.5–1.4)
CTP QC/QA: YES
D DIMER PPP IA.FEU-MCNC: >33 MG/L FEU
DELSYS: ABNORMAL
DIFFERENTIAL METHOD: ABNORMAL
EOSINOPHIL # BLD AUTO: 0 K/UL (ref 0–0.5)
EOSINOPHIL # BLD AUTO: 0.1 K/UL (ref 0–0.5)
EOSINOPHIL # BLD AUTO: 0.2 K/UL (ref 0–0.5)
EOSINOPHIL NFR BLD: 0 % (ref 0–8)
EOSINOPHIL NFR BLD: 0.1 % (ref 0–8)
EOSINOPHIL NFR BLD: 0.2 % (ref 0–8)
EOSINOPHIL NFR BLD: 0.3 % (ref 0–8)
EOSINOPHIL NFR BLD: 0.6 % (ref 0–8)
EOSINOPHIL NFR BLD: 0.7 % (ref 0–8)
EOSINOPHIL NFR BLD: 0.8 % (ref 0–8)
EOSINOPHIL NFR BLD: 0.8 % (ref 0–8)
EOSINOPHIL NFR BLD: 1.1 % (ref 0–8)
EOSINOPHIL NFR BLD: 1.1 % (ref 0–8)
EOSINOPHIL NFR BLD: 1.4 % (ref 0–8)
EOSINOPHIL NFR BLD: 2 % (ref 0–8)
EOSINOPHIL NFR BLD: 2.1 % (ref 0–8)
EOSINOPHIL NFR BLD: 2.2 % (ref 0–8)
ERYTHROCYTE [DISTWIDTH] IN BLOOD BY AUTOMATED COUNT: 15.3 % (ref 11.5–14.5)
ERYTHROCYTE [DISTWIDTH] IN BLOOD BY AUTOMATED COUNT: 15.3 % (ref 11.5–14.5)
ERYTHROCYTE [DISTWIDTH] IN BLOOD BY AUTOMATED COUNT: 15.5 % (ref 11.5–14.5)
ERYTHROCYTE [DISTWIDTH] IN BLOOD BY AUTOMATED COUNT: 15.5 % (ref 11.5–14.5)
ERYTHROCYTE [DISTWIDTH] IN BLOOD BY AUTOMATED COUNT: 15.6 % (ref 11.5–14.5)
ERYTHROCYTE [DISTWIDTH] IN BLOOD BY AUTOMATED COUNT: 15.7 % (ref 11.5–14.5)
ERYTHROCYTE [DISTWIDTH] IN BLOOD BY AUTOMATED COUNT: 15.7 % (ref 11.5–14.5)
ERYTHROCYTE [DISTWIDTH] IN BLOOD BY AUTOMATED COUNT: 15.9 % (ref 11.5–14.5)
ERYTHROCYTE [DISTWIDTH] IN BLOOD BY AUTOMATED COUNT: 16 % (ref 11.5–14.5)
ERYTHROCYTE [DISTWIDTH] IN BLOOD BY AUTOMATED COUNT: 16.1 % (ref 11.5–14.5)
ERYTHROCYTE [DISTWIDTH] IN BLOOD BY AUTOMATED COUNT: 16.2 % (ref 11.5–14.5)
ERYTHROCYTE [DISTWIDTH] IN BLOOD BY AUTOMATED COUNT: 16.4 % (ref 11.5–14.5)
ERYTHROCYTE [DISTWIDTH] IN BLOOD BY AUTOMATED COUNT: 16.5 % (ref 11.5–14.5)
ERYTHROCYTE [DISTWIDTH] IN BLOOD BY AUTOMATED COUNT: 17.1 % (ref 11.5–14.5)
EST. GFR  (NO RACE VARIABLE): >60 ML/MIN/1.73 M^2
FINAL PATHOLOGIC DIAGNOSIS: NORMAL
FIO2: 100
FLOW: 40
FOLATE SERPL-MCNC: <2.2 NG/ML (ref 4–24)
GLUCOSE SERPL-MCNC: 102 MG/DL (ref 70–110)
GLUCOSE SERPL-MCNC: 106 MG/DL (ref 70–110)
GLUCOSE SERPL-MCNC: 106 MG/DL (ref 70–110)
GLUCOSE SERPL-MCNC: 109 MG/DL (ref 70–110)
GLUCOSE SERPL-MCNC: 110 MG/DL (ref 70–110)
GLUCOSE SERPL-MCNC: 113 MG/DL (ref 70–110)
GLUCOSE SERPL-MCNC: 115 MG/DL (ref 70–110)
GLUCOSE SERPL-MCNC: 116 MG/DL (ref 70–110)
GLUCOSE SERPL-MCNC: 118 MG/DL (ref 70–110)
GLUCOSE SERPL-MCNC: 119 MG/DL (ref 70–110)
GLUCOSE SERPL-MCNC: 126 MG/DL (ref 70–110)
GLUCOSE SERPL-MCNC: 128 MG/DL (ref 70–110)
GLUCOSE SERPL-MCNC: 129 MG/DL (ref 70–110)
GLUCOSE SERPL-MCNC: 149 MG/DL (ref 70–110)
GLUCOSE SERPL-MCNC: 172 MG/DL (ref 70–110)
GLUCOSE SERPL-MCNC: 176 MG/DL (ref 70–110)
GLUCOSE SERPL-MCNC: 182 MG/DL (ref 70–110)
GLUCOSE SERPL-MCNC: 191 MG/DL (ref 70–110)
GLUCOSE SERPL-MCNC: 195 MG/DL (ref 70–110)
GLUCOSE SERPL-MCNC: 218 MG/DL (ref 70–110)
GLUCOSE SERPL-MCNC: 94 MG/DL (ref 70–110)
GLUCOSE SERPL-MCNC: 95 MG/DL (ref 70–110)
GLUCOSE SERPL-MCNC: 97 MG/DL (ref 70–110)
GLUCOSE UR QL STRIP: NEGATIVE
GROSS: NORMAL
HCO3 UR-SCNC: 20.4 MMOL/L (ref 24–28)
HCT VFR BLD AUTO: 32.5 % (ref 40–54)
HCT VFR BLD AUTO: 35 % (ref 40–54)
HCT VFR BLD AUTO: 35.5 % (ref 40–54)
HCT VFR BLD AUTO: 36.8 % (ref 40–54)
HCT VFR BLD AUTO: 37 % (ref 40–54)
HCT VFR BLD AUTO: 37.2 % (ref 40–54)
HCT VFR BLD AUTO: 37.6 % (ref 40–54)
HCT VFR BLD AUTO: 37.7 % (ref 40–54)
HCT VFR BLD AUTO: 38.7 % (ref 40–54)
HCT VFR BLD AUTO: 39.1 % (ref 40–54)
HCT VFR BLD AUTO: 39.3 % (ref 40–54)
HCT VFR BLD AUTO: 39.4 % (ref 40–54)
HCT VFR BLD AUTO: 39.5 % (ref 40–54)
HCT VFR BLD AUTO: 40.4 % (ref 40–54)
HCT VFR BLD AUTO: 41 % (ref 40–54)
HCT VFR BLD AUTO: 42.1 % (ref 40–54)
HCT VFR BLD AUTO: 42.5 % (ref 40–54)
HCT VFR BLD AUTO: 42.8 % (ref 40–54)
HCT VFR BLD AUTO: 44.8 % (ref 40–54)
HCT VFR BLD CALC: 42 %PCV (ref 36–54)
HCT VFR BLD CALC: 51 %PCV (ref 36–54)
HCYS SERPL-SCNC: 20.2 UMOL/L (ref 4–16.5)
HGB BLD-MCNC: 10.6 G/DL (ref 14–18)
HGB BLD-MCNC: 11.6 G/DL (ref 14–18)
HGB BLD-MCNC: 12.2 G/DL (ref 14–18)
HGB BLD-MCNC: 12.3 G/DL (ref 14–18)
HGB BLD-MCNC: 12.5 G/DL (ref 14–18)
HGB BLD-MCNC: 12.8 G/DL (ref 14–18)
HGB BLD-MCNC: 13.2 G/DL (ref 14–18)
HGB BLD-MCNC: 13.3 G/DL (ref 14–18)
HGB BLD-MCNC: 13.4 G/DL (ref 14–18)
HGB BLD-MCNC: 13.5 G/DL (ref 14–18)
HGB BLD-MCNC: 13.5 G/DL (ref 14–18)
HGB BLD-MCNC: 14.1 G/DL (ref 14–18)
HGB BLD-MCNC: 14.4 G/DL (ref 14–18)
HGB BLD-MCNC: 15.6 G/DL (ref 14–18)
HGB UR QL STRIP: ABNORMAL
HGB UR QL STRIP: ABNORMAL
HGB UR QL STRIP: NEGATIVE
IMM GRANULOCYTES # BLD AUTO: 0.02 K/UL (ref 0–0.04)
IMM GRANULOCYTES # BLD AUTO: 0.02 K/UL (ref 0–0.04)
IMM GRANULOCYTES # BLD AUTO: 0.03 K/UL (ref 0–0.04)
IMM GRANULOCYTES # BLD AUTO: 0.04 K/UL (ref 0–0.04)
IMM GRANULOCYTES # BLD AUTO: 0.06 K/UL (ref 0–0.04)
IMM GRANULOCYTES # BLD AUTO: 0.07 K/UL (ref 0–0.04)
IMM GRANULOCYTES # BLD AUTO: 0.1 K/UL (ref 0–0.04)
IMM GRANULOCYTES # BLD AUTO: 0.14 K/UL (ref 0–0.04)
IMM GRANULOCYTES # BLD AUTO: 0.14 K/UL (ref 0–0.04)
IMM GRANULOCYTES # BLD AUTO: 0.17 K/UL (ref 0–0.04)
IMM GRANULOCYTES # BLD AUTO: 0.18 K/UL (ref 0–0.04)
IMM GRANULOCYTES # BLD AUTO: 0.2 K/UL (ref 0–0.04)
IMM GRANULOCYTES # BLD AUTO: 0.27 K/UL (ref 0–0.04)
IMM GRANULOCYTES NFR BLD AUTO: 0.3 % (ref 0–0.5)
IMM GRANULOCYTES NFR BLD AUTO: 0.3 % (ref 0–0.5)
IMM GRANULOCYTES NFR BLD AUTO: 0.4 % (ref 0–0.5)
IMM GRANULOCYTES NFR BLD AUTO: 0.5 % (ref 0–0.5)
IMM GRANULOCYTES NFR BLD AUTO: 0.6 % (ref 0–0.5)
IMM GRANULOCYTES NFR BLD AUTO: 0.7 % (ref 0–0.5)
IMM GRANULOCYTES NFR BLD AUTO: 0.8 % (ref 0–0.5)
IMM GRANULOCYTES NFR BLD AUTO: 0.8 % (ref 0–0.5)
IMM GRANULOCYTES NFR BLD AUTO: 0.9 % (ref 0–0.5)
IMM GRANULOCYTES NFR BLD AUTO: 1.1 % (ref 0–0.5)
IMM GRANULOCYTES NFR BLD AUTO: 1.2 % (ref 0–0.5)
INR PPP: 1.1 (ref 0.8–1.2)
INR PPP: 1.2 (ref 0.8–1.2)
KETONES UR QL STRIP: NEGATIVE
LACTATE SERPL-SCNC: 2.2 MMOL/L (ref 0.5–2.2)
LACTATE SERPL-SCNC: 3.7 MMOL/L (ref 0.5–2.2)
LACTATE SERPL-SCNC: 6.7 MMOL/L (ref 0.5–2.2)
LEUKOCYTE ESTERASE UR QL STRIP: NEGATIVE
LYMPHOCYTES # BLD AUTO: 0.4 K/UL (ref 1–4.8)
LYMPHOCYTES # BLD AUTO: 0.5 K/UL (ref 1–4.8)
LYMPHOCYTES # BLD AUTO: 0.8 K/UL (ref 1–4.8)
LYMPHOCYTES # BLD AUTO: 0.9 K/UL (ref 1–4.8)
LYMPHOCYTES # BLD AUTO: 1 K/UL (ref 1–4.8)
LYMPHOCYTES # BLD AUTO: 1 K/UL (ref 1–4.8)
LYMPHOCYTES # BLD AUTO: 1.1 K/UL (ref 1–4.8)
LYMPHOCYTES # BLD AUTO: 1.2 K/UL (ref 1–4.8)
LYMPHOCYTES # BLD AUTO: 1.3 K/UL (ref 1–4.8)
LYMPHOCYTES # BLD AUTO: 1.4 K/UL (ref 1–4.8)
LYMPHOCYTES # BLD AUTO: 1.5 K/UL (ref 1–4.8)
LYMPHOCYTES # BLD AUTO: 1.5 K/UL (ref 1–4.8)
LYMPHOCYTES # BLD AUTO: 1.7 K/UL (ref 1–4.8)
LYMPHOCYTES NFR BLD: 11.5 % (ref 18–48)
LYMPHOCYTES NFR BLD: 12.2 % (ref 18–48)
LYMPHOCYTES NFR BLD: 12.3 % (ref 18–48)
LYMPHOCYTES NFR BLD: 13.2 % (ref 18–48)
LYMPHOCYTES NFR BLD: 16.7 % (ref 18–48)
LYMPHOCYTES NFR BLD: 17.6 % (ref 18–48)
LYMPHOCYTES NFR BLD: 2.6 % (ref 18–48)
LYMPHOCYTES NFR BLD: 23.3 % (ref 18–48)
LYMPHOCYTES NFR BLD: 3.5 % (ref 18–48)
LYMPHOCYTES NFR BLD: 6.1 % (ref 18–48)
LYMPHOCYTES NFR BLD: 6.2 % (ref 18–48)
LYMPHOCYTES NFR BLD: 6.4 % (ref 18–48)
LYMPHOCYTES NFR BLD: 7.9 % (ref 18–48)
LYMPHOCYTES NFR BLD: 8.4 % (ref 18–48)
LYMPHOCYTES NFR BLD: 9.1 % (ref 18–48)
LYMPHOCYTES NFR BLD: 9.6 % (ref 18–48)
Lab: NORMAL
MAGNESIUM SERPL-MCNC: 1.2 MG/DL (ref 1.6–2.6)
MAGNESIUM SERPL-MCNC: 1.5 MG/DL (ref 1.6–2.6)
MAGNESIUM SERPL-MCNC: 1.6 MG/DL (ref 1.6–2.6)
MAGNESIUM SERPL-MCNC: 1.7 MG/DL (ref 1.6–2.6)
MAGNESIUM SERPL-MCNC: 1.8 MG/DL (ref 1.6–2.6)
MAGNESIUM SERPL-MCNC: 1.9 MG/DL (ref 1.6–2.6)
MAGNESIUM SERPL-MCNC: 2 MG/DL (ref 1.6–2.6)
MCH RBC QN AUTO: 27.1 PG (ref 27–31)
MCH RBC QN AUTO: 27.6 PG (ref 27–31)
MCH RBC QN AUTO: 27.7 PG (ref 27–31)
MCH RBC QN AUTO: 27.9 PG (ref 27–31)
MCH RBC QN AUTO: 27.9 PG (ref 27–31)
MCH RBC QN AUTO: 28.2 PG (ref 27–31)
MCH RBC QN AUTO: 28.3 PG (ref 27–31)
MCH RBC QN AUTO: 28.3 PG (ref 27–31)
MCH RBC QN AUTO: 28.6 PG (ref 27–31)
MCH RBC QN AUTO: 28.7 PG (ref 27–31)
MCH RBC QN AUTO: 29.3 PG (ref 27–31)
MCH RBC QN AUTO: 29.7 PG (ref 27–31)
MCH RBC QN AUTO: 30 PG (ref 27–31)
MCH RBC QN AUTO: 30.2 PG (ref 27–31)
MCH RBC QN AUTO: 30.4 PG (ref 27–31)
MCHC RBC AUTO-ENTMCNC: 31.2 G/DL (ref 32–36)
MCHC RBC AUTO-ENTMCNC: 32.1 G/DL (ref 32–36)
MCHC RBC AUTO-ENTMCNC: 32.5 G/DL (ref 32–36)
MCHC RBC AUTO-ENTMCNC: 32.6 G/DL (ref 32–36)
MCHC RBC AUTO-ENTMCNC: 32.7 G/DL (ref 32–36)
MCHC RBC AUTO-ENTMCNC: 32.8 G/DL (ref 32–36)
MCHC RBC AUTO-ENTMCNC: 32.9 G/DL (ref 32–36)
MCHC RBC AUTO-ENTMCNC: 33 G/DL (ref 32–36)
MCHC RBC AUTO-ENTMCNC: 33.1 G/DL (ref 32–36)
MCHC RBC AUTO-ENTMCNC: 33.2 G/DL (ref 32–36)
MCHC RBC AUTO-ENTMCNC: 33.2 G/DL (ref 32–36)
MCHC RBC AUTO-ENTMCNC: 33.4 G/DL (ref 32–36)
MCHC RBC AUTO-ENTMCNC: 33.6 G/DL (ref 32–36)
MCHC RBC AUTO-ENTMCNC: 33.7 G/DL (ref 32–36)
MCHC RBC AUTO-ENTMCNC: 33.9 G/DL (ref 32–36)
MCHC RBC AUTO-ENTMCNC: 34 G/DL (ref 32–36)
MCHC RBC AUTO-ENTMCNC: 34.3 G/DL (ref 32–36)
MCHC RBC AUTO-ENTMCNC: 34.8 G/DL (ref 32–36)
MCHC RBC AUTO-ENTMCNC: 35.1 G/DL (ref 32–36)
MCV RBC AUTO: 82 FL (ref 82–98)
MCV RBC AUTO: 83 FL (ref 82–98)
MCV RBC AUTO: 85 FL (ref 82–98)
MCV RBC AUTO: 85 FL (ref 82–98)
MCV RBC AUTO: 86 FL (ref 82–98)
MCV RBC AUTO: 87 FL (ref 82–98)
MCV RBC AUTO: 88 FL (ref 82–98)
MCV RBC AUTO: 88 FL (ref 82–98)
MCV RBC AUTO: 89 FL (ref 82–98)
MCV RBC AUTO: 90 FL (ref 82–98)
MCV RBC AUTO: 91 FL (ref 82–98)
METHYLMALONATE SERPL-SCNC: 0.21 UMOL/L
MICROSCOPIC COMMENT: ABNORMAL
MICROSCOPIC COMMENT: NORMAL
MODE: ABNORMAL
MONOCYTES # BLD AUTO: 0.1 K/UL (ref 0.3–1)
MONOCYTES # BLD AUTO: 0.1 K/UL (ref 0.3–1)
MONOCYTES # BLD AUTO: 1 K/UL (ref 0.3–1)
MONOCYTES # BLD AUTO: 1 K/UL (ref 0.3–1)
MONOCYTES # BLD AUTO: 1.1 K/UL (ref 0.3–1)
MONOCYTES # BLD AUTO: 1.2 K/UL (ref 0.3–1)
MONOCYTES # BLD AUTO: 1.3 K/UL (ref 0.3–1)
MONOCYTES # BLD AUTO: 1.4 K/UL (ref 0.3–1)
MONOCYTES # BLD AUTO: 1.5 K/UL (ref 0.3–1)
MONOCYTES # BLD AUTO: 1.5 K/UL (ref 0.3–1)
MONOCYTES # BLD AUTO: 1.6 K/UL (ref 0.3–1)
MONOCYTES # BLD AUTO: 2.2 K/UL (ref 0.3–1)
MONOCYTES NFR BLD: 1.1 % (ref 4–15)
MONOCYTES NFR BLD: 1.7 % (ref 4–15)
MONOCYTES NFR BLD: 10.6 % (ref 4–15)
MONOCYTES NFR BLD: 11.1 % (ref 4–15)
MONOCYTES NFR BLD: 11.4 % (ref 4–15)
MONOCYTES NFR BLD: 12 % (ref 4–15)
MONOCYTES NFR BLD: 12.2 % (ref 4–15)
MONOCYTES NFR BLD: 16 % (ref 4–15)
MONOCYTES NFR BLD: 27.2 % (ref 4–15)
MONOCYTES NFR BLD: 4.6 % (ref 4–15)
MONOCYTES NFR BLD: 6.8 % (ref 4–15)
MONOCYTES NFR BLD: 7.2 % (ref 4–15)
MONOCYTES NFR BLD: 7.5 % (ref 4–15)
MONOCYTES NFR BLD: 8.8 % (ref 4–15)
MONOCYTES NFR BLD: 8.9 % (ref 4–15)
MONOCYTES NFR BLD: 9.1 % (ref 4–15)
MONOCYTES NFR BLD: 9.1 % (ref 4–15)
MONOCYTES NFR BLD: 9.8 % (ref 4–15)
NEUTROPHILS # BLD AUTO: 10.9 K/UL (ref 1.8–7.7)
NEUTROPHILS # BLD AUTO: 10.9 K/UL (ref 1.8–7.7)
NEUTROPHILS # BLD AUTO: 13.2 K/UL (ref 1.8–7.7)
NEUTROPHILS # BLD AUTO: 13.6 K/UL (ref 1.8–7.7)
NEUTROPHILS # BLD AUTO: 14.1 K/UL (ref 1.8–7.7)
NEUTROPHILS # BLD AUTO: 14.9 K/UL (ref 1.8–7.7)
NEUTROPHILS # BLD AUTO: 2.3 K/UL (ref 1.8–7.7)
NEUTROPHILS # BLD AUTO: 27.4 K/UL (ref 1.8–7.7)
NEUTROPHILS # BLD AUTO: 29 K/UL (ref 1.8–7.7)
NEUTROPHILS # BLD AUTO: 5.4 K/UL (ref 1.8–7.7)
NEUTROPHILS # BLD AUTO: 5.8 K/UL (ref 1.8–7.7)
NEUTROPHILS # BLD AUTO: 6.2 K/UL (ref 1.8–7.7)
NEUTROPHILS # BLD AUTO: 6.2 K/UL (ref 1.8–7.7)
NEUTROPHILS # BLD AUTO: 6.7 K/UL (ref 1.8–7.7)
NEUTROPHILS # BLD AUTO: 6.8 K/UL (ref 1.8–7.7)
NEUTROPHILS # BLD AUTO: 7.4 K/UL (ref 1.8–7.7)
NEUTROPHILS # BLD AUTO: 9.1 K/UL (ref 1.8–7.7)
NEUTROPHILS # BLD AUTO: 9.4 K/UL (ref 1.8–7.7)
NEUTROPHILS NFR BLD: 46.4 % (ref 38–73)
NEUTROPHILS NFR BLD: 66.5 % (ref 38–73)
NEUTROPHILS NFR BLD: 68.3 % (ref 38–73)
NEUTROPHILS NFR BLD: 71.9 % (ref 38–73)
NEUTROPHILS NFR BLD: 72.1 % (ref 38–73)
NEUTROPHILS NFR BLD: 73.6 % (ref 38–73)
NEUTROPHILS NFR BLD: 76 % (ref 38–73)
NEUTROPHILS NFR BLD: 76.7 % (ref 38–73)
NEUTROPHILS NFR BLD: 80.1 % (ref 38–73)
NEUTROPHILS NFR BLD: 81.4 % (ref 38–73)
NEUTROPHILS NFR BLD: 83 % (ref 38–73)
NEUTROPHILS NFR BLD: 83.8 % (ref 38–73)
NEUTROPHILS NFR BLD: 83.9 % (ref 38–73)
NEUTROPHILS NFR BLD: 84.4 % (ref 38–73)
NEUTROPHILS NFR BLD: 88.7 % (ref 38–73)
NEUTROPHILS NFR BLD: 90.5 % (ref 38–73)
NEUTROPHILS NFR BLD: 90.8 % (ref 38–73)
NEUTROPHILS NFR BLD: 91.9 % (ref 38–73)
NITRITE UR QL STRIP: NEGATIVE
NRBC BLD-RTO: 0 /100 WBC
PCO2 BLDA: 28.2 MMHG (ref 35–45)
PH SMN: 7.47 [PH] (ref 7.35–7.45)
PH UR STRIP: 6 [PH] (ref 5–8)
PH UR STRIP: 6 [PH] (ref 5–8)
PH UR STRIP: 7 [PH] (ref 5–8)
PHOSPHATE SERPL-MCNC: 1.2 MG/DL (ref 2.7–4.5)
PHOSPHATE SERPL-MCNC: 2.6 MG/DL (ref 2.7–4.5)
PHOSPHATE SERPL-MCNC: 2.7 MG/DL (ref 2.7–4.5)
PHOSPHATE SERPL-MCNC: 2.7 MG/DL (ref 2.7–4.5)
PHOSPHATE SERPL-MCNC: 2.8 MG/DL (ref 2.7–4.5)
PHOSPHATE SERPL-MCNC: 2.9 MG/DL (ref 2.7–4.5)
PHOSPHATE SERPL-MCNC: 3.1 MG/DL (ref 2.7–4.5)
PHOSPHATE SERPL-MCNC: 4 MG/DL (ref 2.7–4.5)
PLATELET # BLD AUTO: 190 K/UL (ref 150–450)
PLATELET # BLD AUTO: 217 K/UL (ref 150–450)
PLATELET # BLD AUTO: 223 K/UL (ref 150–450)
PLATELET # BLD AUTO: 228 K/UL (ref 150–450)
PLATELET # BLD AUTO: 237 K/UL (ref 150–450)
PLATELET # BLD AUTO: 241 K/UL (ref 150–450)
PLATELET # BLD AUTO: 243 K/UL (ref 150–450)
PLATELET # BLD AUTO: 246 K/UL (ref 150–450)
PLATELET # BLD AUTO: 261 K/UL (ref 150–450)
PLATELET # BLD AUTO: 266 K/UL (ref 150–450)
PLATELET # BLD AUTO: 266 K/UL (ref 150–450)
PLATELET # BLD AUTO: 268 K/UL (ref 150–450)
PLATELET # BLD AUTO: 277 K/UL (ref 150–450)
PLATELET # BLD AUTO: 282 K/UL (ref 150–450)
PLATELET # BLD AUTO: 287 K/UL (ref 150–450)
PLATELET # BLD AUTO: 301 K/UL (ref 150–450)
PLATELET # BLD AUTO: 302 K/UL (ref 150–450)
PLATELET # BLD AUTO: 316 K/UL (ref 150–450)
PLATELET # BLD AUTO: 323 K/UL (ref 150–450)
PMV BLD AUTO: 10 FL (ref 9.2–12.9)
PMV BLD AUTO: 10.1 FL (ref 9.2–12.9)
PMV BLD AUTO: 10.1 FL (ref 9.2–12.9)
PMV BLD AUTO: 10.2 FL (ref 9.2–12.9)
PMV BLD AUTO: 10.2 FL (ref 9.2–12.9)
PMV BLD AUTO: 10.3 FL (ref 9.2–12.9)
PMV BLD AUTO: 10.5 FL (ref 9.2–12.9)
PMV BLD AUTO: 10.5 FL (ref 9.2–12.9)
PMV BLD AUTO: 10.7 FL (ref 9.2–12.9)
PMV BLD AUTO: 8.8 FL (ref 9.2–12.9)
PMV BLD AUTO: 8.9 FL (ref 9.2–12.9)
PMV BLD AUTO: 9.4 FL (ref 9.2–12.9)
PMV BLD AUTO: 9.6 FL (ref 9.2–12.9)
PMV BLD AUTO: 9.6 FL (ref 9.2–12.9)
PMV BLD AUTO: 9.8 FL (ref 9.2–12.9)
PMV BLD AUTO: 9.9 FL (ref 9.2–12.9)
PMV BLD AUTO: 9.9 FL (ref 9.2–12.9)
PO2 BLDA: 68 MMHG (ref 80–100)
POC BE: -2 MMOL/L
POC IONIZED CALCIUM: 1.02 MMOL/L (ref 1.06–1.42)
POC IONIZED CALCIUM: 1.06 MMOL/L (ref 1.06–1.42)
POC MOLECULAR INFLUENZA A AGN: NEGATIVE
POC MOLECULAR INFLUENZA A AGN: NEGATIVE
POC MOLECULAR INFLUENZA A AGN: POSITIVE
POC MOLECULAR INFLUENZA B AGN: NEGATIVE
POC SATURATED O2: 95 % (ref 95–100)
POC TCO2 (MEASURED): 20 MMOL/L (ref 23–29)
POC TCO2 (MEASURED): 37 MMOL/L (ref 23–29)
POC TCO2: 21 MMOL/L (ref 23–27)
POCT GLUCOSE: 135 MG/DL (ref 70–110)
POCT GLUCOSE: 147 MG/DL (ref 70–110)
POCT GLUCOSE: 152 MG/DL (ref 70–110)
POCT GLUCOSE: 155 MG/DL (ref 70–110)
POCT GLUCOSE: 157 MG/DL (ref 70–110)
POCT GLUCOSE: 166 MG/DL (ref 70–110)
POCT GLUCOSE: 168 MG/DL (ref 70–110)
POCT GLUCOSE: 200 MG/DL (ref 70–110)
POCT GLUCOSE: 202 MG/DL (ref 70–110)
POCT GLUCOSE: 204 MG/DL (ref 70–110)
POCT GLUCOSE: 226 MG/DL (ref 70–110)
POCT GLUCOSE: 229 MG/DL (ref 70–110)
POCT GLUCOSE: 318 MG/DL (ref 70–110)
POTASSIUM BLD-SCNC: 2.2 MMOL/L (ref 3.5–5.1)
POTASSIUM BLD-SCNC: 4.3 MMOL/L (ref 3.5–5.1)
POTASSIUM SERPL-SCNC: 2.4 MMOL/L (ref 3.5–5.1)
POTASSIUM SERPL-SCNC: 2.5 MMOL/L (ref 3.5–5.1)
POTASSIUM SERPL-SCNC: 2.9 MMOL/L (ref 3.5–5.1)
POTASSIUM SERPL-SCNC: 3.1 MMOL/L (ref 3.5–5.1)
POTASSIUM SERPL-SCNC: 3.2 MMOL/L (ref 3.5–5.1)
POTASSIUM SERPL-SCNC: 3.2 MMOL/L (ref 3.5–5.1)
POTASSIUM SERPL-SCNC: 3.4 MMOL/L (ref 3.5–5.1)
POTASSIUM SERPL-SCNC: 3.5 MMOL/L (ref 3.5–5.1)
POTASSIUM SERPL-SCNC: 3.5 MMOL/L (ref 3.5–5.1)
POTASSIUM SERPL-SCNC: 3.6 MMOL/L (ref 3.5–5.1)
POTASSIUM SERPL-SCNC: 3.7 MMOL/L (ref 3.5–5.1)
POTASSIUM SERPL-SCNC: 3.8 MMOL/L (ref 3.5–5.1)
POTASSIUM SERPL-SCNC: 3.8 MMOL/L (ref 3.5–5.1)
POTASSIUM SERPL-SCNC: 3.9 MMOL/L (ref 3.5–5.1)
POTASSIUM SERPL-SCNC: 4 MMOL/L (ref 3.5–5.1)
PROT SERPL-MCNC: 5.6 G/DL (ref 6–8.4)
PROT SERPL-MCNC: 5.8 G/DL (ref 6–8.4)
PROT SERPL-MCNC: 5.8 G/DL (ref 6–8.4)
PROT SERPL-MCNC: 5.9 G/DL (ref 6–8.4)
PROT SERPL-MCNC: 5.9 G/DL (ref 6–8.4)
PROT SERPL-MCNC: 6.1 G/DL (ref 6–8.4)
PROT SERPL-MCNC: 6.2 G/DL (ref 6–8.4)
PROT SERPL-MCNC: 6.5 G/DL (ref 6–8.4)
PROT SERPL-MCNC: 6.6 G/DL (ref 6–8.4)
PROT SERPL-MCNC: 6.6 G/DL (ref 6–8.4)
PROT SERPL-MCNC: 6.8 G/DL (ref 6–8.4)
PROT SERPL-MCNC: 6.8 G/DL (ref 6–8.4)
PROT SERPL-MCNC: 6.9 G/DL (ref 6–8.4)
PROT SERPL-MCNC: 7.4 G/DL (ref 6–8.4)
PROT UR QL STRIP: ABNORMAL
PROT UR QL STRIP: ABNORMAL
PROT UR QL STRIP: NEGATIVE
PROTHROMBIN TIME: 11.9 SEC (ref 9–12.5)
PROTHROMBIN TIME: 12.2 SEC (ref 9–12.5)
PROTHROMBIN TIME: 12.4 SEC (ref 9–12.5)
PROTHROMBIN TIME: 12.4 SEC (ref 9–12.5)
PROTHROMBIN TIME: 12.6 SEC (ref 9–12.5)
RBC # BLD AUTO: 3.71 M/UL (ref 4.6–6.2)
RBC # BLD AUTO: 4.04 M/UL (ref 4.6–6.2)
RBC # BLD AUTO: 4.11 M/UL (ref 4.6–6.2)
RBC # BLD AUTO: 4.12 M/UL (ref 4.6–6.2)
RBC # BLD AUTO: 4.27 M/UL (ref 4.6–6.2)
RBC # BLD AUTO: 4.27 M/UL (ref 4.6–6.2)
RBC # BLD AUTO: 4.38 M/UL (ref 4.6–6.2)
RBC # BLD AUTO: 4.41 M/UL (ref 4.6–6.2)
RBC # BLD AUTO: 4.43 M/UL (ref 4.6–6.2)
RBC # BLD AUTO: 4.59 M/UL (ref 4.6–6.2)
RBC # BLD AUTO: 4.62 M/UL (ref 4.6–6.2)
RBC # BLD AUTO: 4.71 M/UL (ref 4.6–6.2)
RBC # BLD AUTO: 4.73 M/UL (ref 4.6–6.2)
RBC # BLD AUTO: 4.76 M/UL (ref 4.6–6.2)
RBC # BLD AUTO: 4.8 M/UL (ref 4.6–6.2)
RBC # BLD AUTO: 4.9 M/UL (ref 4.6–6.2)
RBC # BLD AUTO: 4.92 M/UL (ref 4.6–6.2)
RBC # BLD AUTO: 4.99 M/UL (ref 4.6–6.2)
RBC # BLD AUTO: 5.46 M/UL (ref 4.6–6.2)
RBC #/AREA URNS HPF: 4 /HPF (ref 0–4)
RBC #/AREA URNS HPF: 5 /HPF (ref 0–4)
SAMPLE: ABNORMAL
SARS-COV-2 RDRP RESP QL NAA+PROBE: NEGATIVE
SARS-COV-2 RNA RESP QL NAA+PROBE: NOT DETECTED
SITE: ABNORMAL
SODIUM BLD-SCNC: 131 MMOL/L (ref 136–145)
SODIUM BLD-SCNC: 134 MMOL/L (ref 136–145)
SODIUM SERPL-SCNC: 133 MMOL/L (ref 136–145)
SODIUM SERPL-SCNC: 134 MMOL/L (ref 136–145)
SODIUM SERPL-SCNC: 135 MMOL/L (ref 136–145)
SODIUM SERPL-SCNC: 136 MMOL/L (ref 136–145)
SODIUM SERPL-SCNC: 137 MMOL/L (ref 136–145)
SODIUM SERPL-SCNC: 138 MMOL/L (ref 136–145)
SODIUM SERPL-SCNC: 139 MMOL/L (ref 136–145)
SODIUM SERPL-SCNC: 140 MMOL/L (ref 136–145)
SP GR UR STRIP: 1.01 (ref 1–1.03)
SP GR UR STRIP: 1.01 (ref 1–1.03)
SP GR UR STRIP: >1.03 (ref 1–1.03)
SP02: 96
SQUAMOUS #/AREA URNS HPF: 0 /HPF
TROPONIN I SERPL DL<=0.01 NG/ML-MCNC: 0.01 NG/ML (ref 0–0.03)
TROPONIN I SERPL DL<=0.01 NG/ML-MCNC: 0.01 NG/ML (ref 0–0.03)
TROPONIN I SERPL DL<=0.01 NG/ML-MCNC: 0.03 NG/ML (ref 0–0.03)
TROPONIN I SERPL DL<=0.01 NG/ML-MCNC: 0.04 NG/ML (ref 0–0.03)
TROPONIN I SERPL DL<=0.01 NG/ML-MCNC: 0.04 NG/ML (ref 0–0.03)
TSH SERPL DL<=0.005 MIU/L-ACNC: 1.24 UIU/ML (ref 0.4–4)
URN SPEC COLLECT METH UR: ABNORMAL
UROBILINOGEN UR STRIP-ACNC: NEGATIVE EU/DL
VANCOMYCIN TROUGH SERPL-MCNC: 15.8 UG/ML (ref 10–22)
VANCOMYCIN TROUGH SERPL-MCNC: 16.8 UG/ML (ref 10–22)
VANCOMYCIN TROUGH SERPL-MCNC: 18.7 UG/ML (ref 10–22)
VIT B1 BLD-MCNC: 34 UG/L (ref 38–122)
VIT B12 SERPL-MCNC: 531 PG/ML (ref 210–950)
WBC # BLD AUTO: 10.02 K/UL (ref 3.9–12.7)
WBC # BLD AUTO: 11.91 K/UL (ref 3.9–12.7)
WBC # BLD AUTO: 12.3 K/UL (ref 3.9–12.7)
WBC # BLD AUTO: 13.34 K/UL (ref 3.9–12.7)
WBC # BLD AUTO: 13.42 K/UL (ref 3.9–12.7)
WBC # BLD AUTO: 13.65 K/UL (ref 3.9–12.7)
WBC # BLD AUTO: 15.71 K/UL (ref 3.9–12.7)
WBC # BLD AUTO: 16.38 K/UL (ref 3.9–12.7)
WBC # BLD AUTO: 16.74 K/UL (ref 3.9–12.7)
WBC # BLD AUTO: 17.73 K/UL (ref 3.9–12.7)
WBC # BLD AUTO: 29.78 K/UL (ref 3.9–12.7)
WBC # BLD AUTO: 32.75 K/UL (ref 3.9–12.7)
WBC # BLD AUTO: 4.86 K/UL (ref 3.9–12.7)
WBC # BLD AUTO: 5.95 K/UL (ref 3.9–12.7)
WBC # BLD AUTO: 6.36 K/UL (ref 3.9–12.7)
WBC # BLD AUTO: 9.12 K/UL (ref 3.9–12.7)
WBC # BLD AUTO: 9.25 K/UL (ref 3.9–12.7)
WBC # BLD AUTO: 9.35 K/UL (ref 3.9–12.7)
WBC # BLD AUTO: 9.44 K/UL (ref 3.9–12.7)
WBC #/AREA URNS HPF: 2 /HPF (ref 0–5)
WBC CLUMPS URNS QL MICRO: ABNORMAL

## 2023-01-01 PROCEDURE — 99999 PR PBB SHADOW E&M-EST. PATIENT-LVL III: CPT | Mod: PBBFAC,,, | Performed by: STUDENT IN AN ORGANIZED HEALTH CARE EDUCATION/TRAINING PROGRAM

## 2023-01-01 PROCEDURE — 20600001 HC STEP DOWN PRIVATE ROOM

## 2023-01-01 PROCEDURE — 85025 COMPLETE CBC W/AUTO DIFF WBC: CPT | Performed by: HOSPITALIST

## 2023-01-01 PROCEDURE — A9698 NON-RAD CONTRAST MATERIALNOC: HCPCS | Performed by: STUDENT IN AN ORGANIZED HEALTH CARE EDUCATION/TRAINING PROGRAM

## 2023-01-01 PROCEDURE — 36000707: Performed by: SURGERY

## 2023-01-01 PROCEDURE — 25000242 PHARM REV CODE 250 ALT 637 W/ HCPCS: Performed by: HOSPITALIST

## 2023-01-01 PROCEDURE — 25000003 PHARM REV CODE 250: Performed by: HOSPITALIST

## 2023-01-01 PROCEDURE — 99223 1ST HOSP IP/OBS HIGH 75: CPT | Mod: ,,, | Performed by: NURSE PRACTITIONER

## 2023-01-01 PROCEDURE — 94640 AIRWAY INHALATION TREATMENT: CPT

## 2023-01-01 PROCEDURE — 99215 PR OFFICE/OUTPT VISIT, EST, LEVL V, 40-54 MIN: ICD-10-PCS | Mod: S$PBB,,, | Performed by: FAMILY MEDICINE

## 2023-01-01 PROCEDURE — 36415 COLL VENOUS BLD VENIPUNCTURE: CPT | Performed by: HOSPITALIST

## 2023-01-01 PROCEDURE — 25000003 PHARM REV CODE 250: Performed by: STUDENT IN AN ORGANIZED HEALTH CARE EDUCATION/TRAINING PROGRAM

## 2023-01-01 PROCEDURE — 97165 OT EVAL LOW COMPLEX 30 MIN: CPT

## 2023-01-01 PROCEDURE — 20000000 HC ICU ROOM

## 2023-01-01 PROCEDURE — 99223 PR INITIAL HOSPITAL CARE,LEVL III: ICD-10-PCS | Mod: ,,, | Performed by: NURSE PRACTITIONER

## 2023-01-01 PROCEDURE — 99231 PR SUBSEQUENT HOSPITAL CARE,LEVL I: ICD-10-PCS | Mod: ,,, | Performed by: HOSPITALIST

## 2023-01-01 PROCEDURE — 97535 SELF CARE MNGMENT TRAINING: CPT

## 2023-01-01 PROCEDURE — 99999 PR PBB SHADOW E&M-EST. PATIENT-LVL III: ICD-10-PCS | Mod: PBBFAC,,, | Performed by: STUDENT IN AN ORGANIZED HEALTH CARE EDUCATION/TRAINING PROGRAM

## 2023-01-01 PROCEDURE — 94799 UNLISTED PULMONARY SVC/PX: CPT

## 2023-01-01 PROCEDURE — 96413 CHEMO IV INFUSION 1 HR: CPT

## 2023-01-01 PROCEDURE — 94761 N-INVAS EAR/PLS OXIMETRY MLT: CPT

## 2023-01-01 PROCEDURE — 85025 COMPLETE CBC W/AUTO DIFF WBC: CPT | Performed by: STUDENT IN AN ORGANIZED HEALTH CARE EDUCATION/TRAINING PROGRAM

## 2023-01-01 PROCEDURE — 63600175 PHARM REV CODE 636 W HCPCS: Performed by: EMERGENCY MEDICINE

## 2023-01-01 PROCEDURE — 99223 PR INITIAL HOSPITAL CARE,LEVL III: ICD-10-PCS | Mod: ,,, | Performed by: INTERNAL MEDICINE

## 2023-01-01 PROCEDURE — A4216 STERILE WATER/SALINE, 10 ML: HCPCS | Performed by: INTERNAL MEDICINE

## 2023-01-01 PROCEDURE — 84425 ASSAY OF VITAMIN B-1: CPT | Performed by: STUDENT IN AN ORGANIZED HEALTH CARE EDUCATION/TRAINING PROGRAM

## 2023-01-01 PROCEDURE — 99900035 HC TECH TIME PER 15 MIN (STAT)

## 2023-01-01 PROCEDURE — 63600175 PHARM REV CODE 636 W HCPCS: Performed by: HOSPITALIST

## 2023-01-01 PROCEDURE — 93005 ELECTROCARDIOGRAM TRACING: CPT

## 2023-01-01 PROCEDURE — 27000249 HC VAPOTHERM CIRCUIT

## 2023-01-01 PROCEDURE — 92526 ORAL FUNCTION THERAPY: CPT

## 2023-01-01 PROCEDURE — 86580 TB INTRADERMAL TEST: CPT | Performed by: HOSPITALIST

## 2023-01-01 PROCEDURE — 96367 TX/PROPH/DG ADDL SEQ IV INF: CPT

## 2023-01-01 PROCEDURE — 25000242 PHARM REV CODE 250 ALT 637 W/ HCPCS: Performed by: STUDENT IN AN ORGANIZED HEALTH CARE EDUCATION/TRAINING PROGRAM

## 2023-01-01 PROCEDURE — 27000221 HC OXYGEN, UP TO 24 HOURS

## 2023-01-01 PROCEDURE — 83921 ORGANIC ACID SINGLE QUANT: CPT | Performed by: STUDENT IN AN ORGANIZED HEALTH CARE EDUCATION/TRAINING PROGRAM

## 2023-01-01 PROCEDURE — 99497 ADVNCD CARE PLAN 30 MIN: CPT | Mod: 25,,, | Performed by: STUDENT IN AN ORGANIZED HEALTH CARE EDUCATION/TRAINING PROGRAM

## 2023-01-01 PROCEDURE — 83735 ASSAY OF MAGNESIUM: CPT | Performed by: HOSPITALIST

## 2023-01-01 PROCEDURE — 85025 COMPLETE CBC W/AUTO DIFF WBC: CPT | Performed by: EMERGENCY MEDICINE

## 2023-01-01 PROCEDURE — 84484 ASSAY OF TROPONIN QUANT: CPT | Mod: 91 | Performed by: EMERGENCY MEDICINE

## 2023-01-01 PROCEDURE — 36415 COLL VENOUS BLD VENIPUNCTURE: CPT | Performed by: STUDENT IN AN ORGANIZED HEALTH CARE EDUCATION/TRAINING PROGRAM

## 2023-01-01 PROCEDURE — 25000003 PHARM REV CODE 250: Performed by: INTERNAL MEDICINE

## 2023-01-01 PROCEDURE — 83735 ASSAY OF MAGNESIUM: CPT | Mod: 91 | Performed by: HOSPITALIST

## 2023-01-01 PROCEDURE — 99231 SBSQ HOSP IP/OBS SF/LOW 25: CPT | Mod: ,,, | Performed by: HOSPITALIST

## 2023-01-01 PROCEDURE — 27100171 HC OXYGEN HIGH FLOW UP TO 24 HOURS

## 2023-01-01 PROCEDURE — 99285 EMERGENCY DEPT VISIT HI MDM: CPT | Mod: 25

## 2023-01-01 PROCEDURE — 71260 CT THORAX DX C+: CPT | Mod: 26,,, | Performed by: RADIOLOGY

## 2023-01-01 PROCEDURE — 81000 URINALYSIS NONAUTO W/SCOPE: CPT | Performed by: EMERGENCY MEDICINE

## 2023-01-01 PROCEDURE — 96365 THER/PROPH/DIAG IV INF INIT: CPT | Mod: 59

## 2023-01-01 PROCEDURE — 27000646 HC AEROBIKA DEVICE

## 2023-01-01 PROCEDURE — 99223 1ST HOSP IP/OBS HIGH 75: CPT | Mod: ,,, | Performed by: INTERNAL MEDICINE

## 2023-01-01 PROCEDURE — 99215 OFFICE O/P EST HI 40 MIN: CPT | Mod: S$PBB,,, | Performed by: FAMILY MEDICINE

## 2023-01-01 PROCEDURE — D9220A PRA ANESTHESIA: ICD-10-PCS | Mod: ANES,,, | Performed by: ANESTHESIOLOGY

## 2023-01-01 PROCEDURE — 36410 VNPNXR 3YR/> PHY/QHP DX/THER: CPT

## 2023-01-01 PROCEDURE — 83605 ASSAY OF LACTIC ACID: CPT | Performed by: STUDENT IN AN ORGANIZED HEALTH CARE EDUCATION/TRAINING PROGRAM

## 2023-01-01 PROCEDURE — 80053 COMPREHEN METABOLIC PANEL: CPT | Performed by: HOSPITALIST

## 2023-01-01 PROCEDURE — 99233 PR SUBSEQUENT HOSPITAL CARE,LEVL III: ICD-10-PCS | Mod: FS,,, | Performed by: PHYSICIAN ASSISTANT

## 2023-01-01 PROCEDURE — 97530 THERAPEUTIC ACTIVITIES: CPT

## 2023-01-01 PROCEDURE — 84132 ASSAY OF SERUM POTASSIUM: CPT

## 2023-01-01 PROCEDURE — 99232 PR SUBSEQUENT HOSPITAL CARE,LEVL II: ICD-10-PCS | Mod: ,,, | Performed by: INTERNAL MEDICINE

## 2023-01-01 PROCEDURE — 83735 ASSAY OF MAGNESIUM: CPT | Performed by: STUDENT IN AN ORGANIZED HEALTH CARE EDUCATION/TRAINING PROGRAM

## 2023-01-01 PROCEDURE — 63600175 PHARM REV CODE 636 W HCPCS: Performed by: INTERNAL MEDICINE

## 2023-01-01 PROCEDURE — 63600175 PHARM REV CODE 636 W HCPCS: Performed by: STUDENT IN AN ORGANIZED HEALTH CARE EDUCATION/TRAINING PROGRAM

## 2023-01-01 PROCEDURE — G0316 PR PROLONG INPT EVAL EA ADDL 15 MIN: ICD-10-PCS | Mod: ,,, | Performed by: STUDENT IN AN ORGANIZED HEALTH CARE EDUCATION/TRAINING PROGRAM

## 2023-01-01 PROCEDURE — 84100 ASSAY OF PHOSPHORUS: CPT | Performed by: HOSPITALIST

## 2023-01-01 PROCEDURE — 80048 BASIC METABOLIC PNL TOTAL CA: CPT | Performed by: HOSPITALIST

## 2023-01-01 PROCEDURE — 87502 INFLUENZA DNA AMP PROBE: CPT

## 2023-01-01 PROCEDURE — 25500020 PHARM REV CODE 255: Performed by: STUDENT IN AN ORGANIZED HEALTH CARE EDUCATION/TRAINING PROGRAM

## 2023-01-01 PROCEDURE — 93010 EKG 12-LEAD: ICD-10-PCS | Mod: ,,, | Performed by: INTERNAL MEDICINE

## 2023-01-01 PROCEDURE — 99223 PR INITIAL HOSPITAL CARE,LEVL III: ICD-10-PCS | Mod: ,,, | Performed by: STUDENT IN AN ORGANIZED HEALTH CARE EDUCATION/TRAINING PROGRAM

## 2023-01-01 PROCEDURE — 81003 URINALYSIS AUTO W/O SCOPE: CPT | Performed by: EMERGENCY MEDICINE

## 2023-01-01 PROCEDURE — 21400001 HC TELEMETRY ROOM

## 2023-01-01 PROCEDURE — 84295 ASSAY OF SERUM SODIUM: CPT

## 2023-01-01 PROCEDURE — 82746 ASSAY OF FOLIC ACID SERUM: CPT | Performed by: INTERNAL MEDICINE

## 2023-01-01 PROCEDURE — 88300 PR  SURG PATH,GROSS,LEVEL I: ICD-10-PCS | Mod: 26,,, | Performed by: PATHOLOGY

## 2023-01-01 PROCEDURE — 99232 SBSQ HOSP IP/OBS MODERATE 35: CPT | Mod: ,,, | Performed by: INTERNAL MEDICINE

## 2023-01-01 PROCEDURE — 36415 COLL VENOUS BLD VENIPUNCTURE: CPT | Performed by: INTERNAL MEDICINE

## 2023-01-01 PROCEDURE — 85014 HEMATOCRIT: CPT

## 2023-01-01 PROCEDURE — D9220A PRA ANESTHESIA: Mod: CRNA,,, | Performed by: STUDENT IN AN ORGANIZED HEALTH CARE EDUCATION/TRAINING PROGRAM

## 2023-01-01 PROCEDURE — 99497 PR ADVNCD CARE PLAN 30 MIN: ICD-10-PCS | Mod: 25,,, | Performed by: NURSE PRACTITIONER

## 2023-01-01 PROCEDURE — 74177 CT ABD & PELVIS W/CONTRAST: CPT | Mod: 26,,, | Performed by: RADIOLOGY

## 2023-01-01 PROCEDURE — 27000190 HC CPAP FULL FACE MASK W/VALVE

## 2023-01-01 PROCEDURE — 99214 OFFICE O/P EST MOD 30 MIN: CPT | Mod: PBBFAC,27,PO | Performed by: NURSE PRACTITIONER

## 2023-01-01 PROCEDURE — 84100 ASSAY OF PHOSPHORUS: CPT | Performed by: STUDENT IN AN ORGANIZED HEALTH CARE EDUCATION/TRAINING PROGRAM

## 2023-01-01 PROCEDURE — 99239 HOSP IP/OBS DSCHRG MGMT >30: CPT | Mod: ,,, | Performed by: HOSPITALIST

## 2023-01-01 PROCEDURE — 99233 PR SUBSEQUENT HOSPITAL CARE,LEVL III: ICD-10-PCS | Mod: ,,, | Performed by: STUDENT IN AN ORGANIZED HEALTH CARE EDUCATION/TRAINING PROGRAM

## 2023-01-01 PROCEDURE — 85610 PROTHROMBIN TIME: CPT | Performed by: STUDENT IN AN ORGANIZED HEALTH CARE EDUCATION/TRAINING PROGRAM

## 2023-01-01 PROCEDURE — 87077 CULTURE AEROBIC IDENTIFY: CPT | Performed by: STUDENT IN AN ORGANIZED HEALTH CARE EDUCATION/TRAINING PROGRAM

## 2023-01-01 PROCEDURE — 80047 BASIC METABLC PNL IONIZED CA: CPT

## 2023-01-01 PROCEDURE — 80053 COMPREHEN METABOLIC PANEL: CPT | Performed by: EMERGENCY MEDICINE

## 2023-01-01 PROCEDURE — 97162 PT EVAL MOD COMPLEX 30 MIN: CPT

## 2023-01-01 PROCEDURE — 99999 PR PBB SHADOW E&M-EST. PATIENT-LVL V: ICD-10-PCS | Mod: PBBFAC,,, | Performed by: NURSE PRACTITIONER

## 2023-01-01 PROCEDURE — 93010 ELECTROCARDIOGRAM REPORT: CPT | Mod: ,,, | Performed by: INTERNAL MEDICINE

## 2023-01-01 PROCEDURE — G0316 PR PROLONG INPT EVAL EA ADDL 15 MIN: HCPCS | Mod: ,,, | Performed by: STUDENT IN AN ORGANIZED HEALTH CARE EDUCATION/TRAINING PROGRAM

## 2023-01-01 PROCEDURE — 99214 PR OFFICE/OUTPT VISIT, EST, LEVL IV, 30-39 MIN: ICD-10-PCS | Mod: S$PBB,,, | Performed by: NURSE PRACTITIONER

## 2023-01-01 PROCEDURE — 99223 PR INITIAL HOSPITAL CARE,LEVL III: ICD-10-PCS | Mod: FS,,, | Performed by: PHYSICIAN ASSISTANT

## 2023-01-01 PROCEDURE — 84484 ASSAY OF TROPONIN QUANT: CPT | Performed by: STUDENT IN AN ORGANIZED HEALTH CARE EDUCATION/TRAINING PROGRAM

## 2023-01-01 PROCEDURE — 74177 CT ABD & PELVIS W/CONTRAST: CPT | Mod: TC

## 2023-01-01 PROCEDURE — 71260 CT THORAX DX C+: CPT | Mod: TC

## 2023-01-01 PROCEDURE — 27100092 HC HIGH FLOW DELIVERY CANNULA

## 2023-01-01 PROCEDURE — 99999 PR PBB SHADOW E&M-EST. PATIENT-LVL IV: CPT | Mod: PBBFAC,,, | Performed by: NURSE PRACTITIONER

## 2023-01-01 PROCEDURE — 63600175 PHARM REV CODE 636 W HCPCS: Mod: TB,JG | Performed by: HOSPITALIST

## 2023-01-01 PROCEDURE — 80048 BASIC METABOLIC PNL TOTAL CA: CPT | Mod: XB | Performed by: HOSPITALIST

## 2023-01-01 PROCEDURE — 83735 ASSAY OF MAGNESIUM: CPT | Performed by: EMERGENCY MEDICINE

## 2023-01-01 PROCEDURE — 80076 HEPATIC FUNCTION PANEL: CPT | Performed by: STUDENT IN AN ORGANIZED HEALTH CARE EDUCATION/TRAINING PROGRAM

## 2023-01-01 PROCEDURE — 99214 OFFICE O/P EST MOD 30 MIN: CPT | Mod: S$PBB,,, | Performed by: NURSE PRACTITIONER

## 2023-01-01 PROCEDURE — 36000706: Performed by: SURGERY

## 2023-01-01 PROCEDURE — 99497 ADVNCD CARE PLAN 30 MIN: CPT | Mod: 25,,, | Performed by: NURSE PRACTITIONER

## 2023-01-01 PROCEDURE — 25000242 PHARM REV CODE 250 ALT 637 W/ HCPCS: Performed by: INTERNAL MEDICINE

## 2023-01-01 PROCEDURE — 95819 EEG AWAKE AND ASLEEP: CPT | Mod: 26,,, | Performed by: STUDENT IN AN ORGANIZED HEALTH CARE EDUCATION/TRAINING PROGRAM

## 2023-01-01 PROCEDURE — 36590 PR REMOVAL TUNNELED CV CATH W SUBQ PORT OR PUMP: ICD-10-PCS | Mod: ,,, | Performed by: SURGERY

## 2023-01-01 PROCEDURE — 80053 COMPREHEN METABOLIC PANEL: CPT | Performed by: STUDENT IN AN ORGANIZED HEALTH CARE EDUCATION/TRAINING PROGRAM

## 2023-01-01 PROCEDURE — 27000207 HC ISOLATION

## 2023-01-01 PROCEDURE — 80202 ASSAY OF VANCOMYCIN: CPT | Performed by: HOSPITALIST

## 2023-01-01 PROCEDURE — A4216 STERILE WATER/SALINE, 10 ML: HCPCS | Performed by: STUDENT IN AN ORGANIZED HEALTH CARE EDUCATION/TRAINING PROGRAM

## 2023-01-01 PROCEDURE — 25000242 PHARM REV CODE 250 ALT 637 W/ HCPCS: Performed by: EMERGENCY MEDICINE

## 2023-01-01 PROCEDURE — 99213 OFFICE O/P EST LOW 20 MIN: CPT | Mod: PBBFAC | Performed by: STUDENT IN AN ORGANIZED HEALTH CARE EDUCATION/TRAINING PROGRAM

## 2023-01-01 PROCEDURE — 85379 FIBRIN DEGRADATION QUANT: CPT | Performed by: STUDENT IN AN ORGANIZED HEALTH CARE EDUCATION/TRAINING PROGRAM

## 2023-01-01 PROCEDURE — 83880 ASSAY OF NATRIURETIC PEPTIDE: CPT | Performed by: STUDENT IN AN ORGANIZED HEALTH CARE EDUCATION/TRAINING PROGRAM

## 2023-01-01 PROCEDURE — 25000003 PHARM REV CODE 250: Performed by: ANESTHESIOLOGY

## 2023-01-01 PROCEDURE — 94760 N-INVAS EAR/PLS OXIMETRY 1: CPT

## 2023-01-01 PROCEDURE — 99291 CRITICAL CARE FIRST HOUR: CPT

## 2023-01-01 PROCEDURE — 76937 US GUIDE VASCULAR ACCESS: CPT

## 2023-01-01 PROCEDURE — 99233 PR SUBSEQUENT HOSPITAL CARE,LEVL III: ICD-10-PCS | Mod: ,,, | Performed by: INTERNAL MEDICINE

## 2023-01-01 PROCEDURE — 99999 PR PBB SHADOW E&M-EST. PATIENT-LVL IV: ICD-10-PCS | Mod: PBBFAC,,, | Performed by: NURSE PRACTITIONER

## 2023-01-01 PROCEDURE — 96375 TX/PRO/DX INJ NEW DRUG ADDON: CPT

## 2023-01-01 PROCEDURE — 95812 EEG 41-60 MINUTES: CPT

## 2023-01-01 PROCEDURE — 99215 OFFICE O/P EST HI 40 MIN: CPT | Mod: S$PBB,,, | Performed by: STUDENT IN AN ORGANIZED HEALTH CARE EDUCATION/TRAINING PROGRAM

## 2023-01-01 PROCEDURE — 83880 ASSAY OF NATRIURETIC PEPTIDE: CPT | Performed by: EMERGENCY MEDICINE

## 2023-01-01 PROCEDURE — 99214 PR OFFICE/OUTPT VISIT, EST, LEVL IV, 30-39 MIN: ICD-10-PCS | Mod: S$PBB,,, | Performed by: STUDENT IN AN ORGANIZED HEALTH CARE EDUCATION/TRAINING PROGRAM

## 2023-01-01 PROCEDURE — 96365 THER/PROPH/DIAG IV INF INIT: CPT

## 2023-01-01 PROCEDURE — 87040 BLOOD CULTURE FOR BACTERIA: CPT | Mod: 59 | Performed by: HOSPITALIST

## 2023-01-01 PROCEDURE — 99233 SBSQ HOSP IP/OBS HIGH 50: CPT | Mod: ,,, | Performed by: STUDENT IN AN ORGANIZED HEALTH CARE EDUCATION/TRAINING PROGRAM

## 2023-01-01 PROCEDURE — 99215 OFFICE O/P EST HI 40 MIN: CPT | Mod: PBBFAC,PO | Performed by: NURSE PRACTITIONER

## 2023-01-01 PROCEDURE — 85730 THROMBOPLASTIN TIME PARTIAL: CPT | Performed by: HOSPITALIST

## 2023-01-01 PROCEDURE — 85730 THROMBOPLASTIN TIME PARTIAL: CPT | Performed by: STUDENT IN AN ORGANIZED HEALTH CARE EDUCATION/TRAINING PROGRAM

## 2023-01-01 PROCEDURE — 80048 BASIC METABOLIC PNL TOTAL CA: CPT | Performed by: STUDENT IN AN ORGANIZED HEALTH CARE EDUCATION/TRAINING PROGRAM

## 2023-01-01 PROCEDURE — 99223 1ST HOSP IP/OBS HIGH 75: CPT | Mod: FS,,, | Performed by: PHYSICIAN ASSISTANT

## 2023-01-01 PROCEDURE — 83605 ASSAY OF LACTIC ACID: CPT | Performed by: EMERGENCY MEDICINE

## 2023-01-01 PROCEDURE — 37000008 HC ANESTHESIA 1ST 15 MINUTES: Performed by: SURGERY

## 2023-01-01 PROCEDURE — 99214 OFFICE O/P EST MOD 30 MIN: CPT | Mod: S$PBB,,, | Performed by: STUDENT IN AN ORGANIZED HEALTH CARE EDUCATION/TRAINING PROGRAM

## 2023-01-01 PROCEDURE — 99999 PR PBB SHADOW E&M-EST. PATIENT-LVL III: ICD-10-PCS | Mod: PBBFAC,,, | Performed by: FAMILY MEDICINE

## 2023-01-01 PROCEDURE — 25500020 PHARM REV CODE 255: Performed by: INTERNAL MEDICINE

## 2023-01-01 PROCEDURE — 25000003 PHARM REV CODE 250: Performed by: EMERGENCY MEDICINE

## 2023-01-01 PROCEDURE — 11000001 HC ACUTE MED/SURG PRIVATE ROOM

## 2023-01-01 PROCEDURE — 97116 GAIT TRAINING THERAPY: CPT | Mod: CQ

## 2023-01-01 PROCEDURE — 85610 PROTHROMBIN TIME: CPT | Performed by: HOSPITALIST

## 2023-01-01 PROCEDURE — 99233 SBSQ HOSP IP/OBS HIGH 50: CPT | Mod: ,,, | Performed by: INTERNAL MEDICINE

## 2023-01-01 PROCEDURE — 84484 ASSAY OF TROPONIN QUANT: CPT | Performed by: EMERGENCY MEDICINE

## 2023-01-01 PROCEDURE — 99285 EMERGENCY DEPT VISIT HI MDM: CPT | Mod: 25,27

## 2023-01-01 PROCEDURE — 99213 OFFICE O/P EST LOW 20 MIN: CPT | Mod: PBBFAC,PO | Performed by: FAMILY MEDICINE

## 2023-01-01 PROCEDURE — 85027 COMPLETE CBC AUTOMATED: CPT | Performed by: HOSPITALIST

## 2023-01-01 PROCEDURE — 87635 SARS-COV-2 COVID-19 AMP PRB: CPT | Performed by: EMERGENCY MEDICINE

## 2023-01-01 PROCEDURE — 99999 PR PBB SHADOW E&M-EST. PATIENT-LVL III: CPT | Mod: PBBFAC,,, | Performed by: FAMILY MEDICINE

## 2023-01-01 PROCEDURE — 82803 BLOOD GASES ANY COMBINATION: CPT

## 2023-01-01 PROCEDURE — 92610 EVALUATE SWALLOWING FUNCTION: CPT

## 2023-01-01 PROCEDURE — 82565 ASSAY OF CREATININE: CPT

## 2023-01-01 PROCEDURE — 37000009 HC ANESTHESIA EA ADD 15 MINS: Performed by: SURGERY

## 2023-01-01 PROCEDURE — 82330 ASSAY OF CALCIUM: CPT

## 2023-01-01 PROCEDURE — 82607 VITAMIN B-12: CPT | Performed by: INTERNAL MEDICINE

## 2023-01-01 PROCEDURE — 25500020 PHARM REV CODE 255: Performed by: EMERGENCY MEDICINE

## 2023-01-01 PROCEDURE — 30200315 PPD INTRADERMAL TEST REV CODE 302: Performed by: HOSPITALIST

## 2023-01-01 PROCEDURE — 94660 CPAP INITIATION&MGMT: CPT

## 2023-01-01 PROCEDURE — 36600 WITHDRAWAL OF ARTERIAL BLOOD: CPT

## 2023-01-01 PROCEDURE — 85610 PROTHROMBIN TIME: CPT | Performed by: EMERGENCY MEDICINE

## 2023-01-01 PROCEDURE — 99223 PR INITIAL HOSPITAL CARE,LEVL III: ICD-10-PCS | Mod: GC,,, | Performed by: SURGERY

## 2023-01-01 PROCEDURE — 82140 ASSAY OF AMMONIA: CPT | Performed by: EMERGENCY MEDICINE

## 2023-01-01 PROCEDURE — 99213 OFFICE O/P EST LOW 20 MIN: CPT | Mod: PBBFAC,25 | Performed by: STUDENT IN AN ORGANIZED HEALTH CARE EDUCATION/TRAINING PROGRAM

## 2023-01-01 PROCEDURE — A9585 GADOBUTROL INJECTION: HCPCS | Performed by: INTERNAL MEDICINE

## 2023-01-01 PROCEDURE — 96374 THER/PROPH/DIAG INJ IV PUSH: CPT

## 2023-01-01 PROCEDURE — 71000033 HC RECOVERY, INTIAL HOUR: Performed by: SURGERY

## 2023-01-01 PROCEDURE — 25000003 PHARM REV CODE 250: Performed by: SURGERY

## 2023-01-01 PROCEDURE — 99223 1ST HOSP IP/OBS HIGH 75: CPT | Mod: ,,, | Performed by: STUDENT IN AN ORGANIZED HEALTH CARE EDUCATION/TRAINING PROGRAM

## 2023-01-01 PROCEDURE — 99233 SBSQ HOSP IP/OBS HIGH 50: CPT | Mod: FS,,, | Performed by: PHYSICIAN ASSISTANT

## 2023-01-01 PROCEDURE — 84443 ASSAY THYROID STIM HORMONE: CPT | Performed by: INTERNAL MEDICINE

## 2023-01-01 PROCEDURE — D9220A PRA ANESTHESIA: Mod: ANES,,, | Performed by: ANESTHESIOLOGY

## 2023-01-01 PROCEDURE — 99999 PR PBB SHADOW E&M-EST. PATIENT-LVL V: CPT | Mod: PBBFAC,,, | Performed by: NURSE PRACTITIONER

## 2023-01-01 PROCEDURE — 87040 BLOOD CULTURE FOR BACTERIA: CPT | Performed by: STUDENT IN AN ORGANIZED HEALTH CARE EDUCATION/TRAINING PROGRAM

## 2023-01-01 PROCEDURE — 99215 PR OFFICE/OUTPT VISIT, EST, LEVL V, 40-54 MIN: ICD-10-PCS | Mod: S$PBB,,, | Performed by: STUDENT IN AN ORGANIZED HEALTH CARE EDUCATION/TRAINING PROGRAM

## 2023-01-01 PROCEDURE — 88300 SURGICAL PATH GROSS: CPT | Performed by: PATHOLOGY

## 2023-01-01 PROCEDURE — 88300 SURGICAL PATH GROSS: CPT | Mod: 26,,, | Performed by: PATHOLOGY

## 2023-01-01 PROCEDURE — 74177 CT CHEST ABDOMEN PELVIS WITH CONTRAST (XPD): ICD-10-PCS | Mod: 26,,, | Performed by: RADIOLOGY

## 2023-01-01 PROCEDURE — 83090 ASSAY OF HOMOCYSTEINE: CPT | Performed by: STUDENT IN AN ORGANIZED HEALTH CARE EDUCATION/TRAINING PROGRAM

## 2023-01-01 PROCEDURE — C1751 CATH, INF, PER/CENT/MIDLINE: HCPCS

## 2023-01-01 PROCEDURE — 94664 DEMO&/EVAL PT USE INHALER: CPT

## 2023-01-01 PROCEDURE — 87635 SARS-COV-2 COVID-19 AMP PRB: CPT | Performed by: HOSPITALIST

## 2023-01-01 PROCEDURE — 99239 PR HOSPITAL DISCHARGE DAY,>30 MIN: ICD-10-PCS | Mod: ,,, | Performed by: HOSPITALIST

## 2023-01-01 PROCEDURE — 71260 CT CHEST ABDOMEN PELVIS WITH CONTRAST (XPD): ICD-10-PCS | Mod: 26,,, | Performed by: RADIOLOGY

## 2023-01-01 PROCEDURE — 12000002 HC ACUTE/MED SURGE SEMI-PRIVATE ROOM

## 2023-01-01 PROCEDURE — 95819 PR EEG,W/AWAKE & ASLEEP RECORD: ICD-10-PCS | Mod: 26,,, | Performed by: STUDENT IN AN ORGANIZED HEALTH CARE EDUCATION/TRAINING PROGRAM

## 2023-01-01 PROCEDURE — 94640 AIRWAY INHALATION TREATMENT: CPT | Mod: XB

## 2023-01-01 PROCEDURE — 81000 URINALYSIS NONAUTO W/SCOPE: CPT | Performed by: STUDENT IN AN ORGANIZED HEALTH CARE EDUCATION/TRAINING PROGRAM

## 2023-01-01 PROCEDURE — 96366 THER/PROPH/DIAG IV INF ADDON: CPT

## 2023-01-01 PROCEDURE — 87186 SC STD MICRODIL/AGAR DIL: CPT | Performed by: STUDENT IN AN ORGANIZED HEALTH CARE EDUCATION/TRAINING PROGRAM

## 2023-01-01 PROCEDURE — 99497 PR ADVNCD CARE PLAN 30 MIN: ICD-10-PCS | Mod: 25,,, | Performed by: STUDENT IN AN ORGANIZED HEALTH CARE EDUCATION/TRAINING PROGRAM

## 2023-01-01 PROCEDURE — 99223 1ST HOSP IP/OBS HIGH 75: CPT | Mod: GC,,, | Performed by: SURGERY

## 2023-01-01 PROCEDURE — 36590 REMOVAL TUNNELED CV CATH: CPT | Mod: ,,, | Performed by: SURGERY

## 2023-01-01 PROCEDURE — 84100 ASSAY OF PHOSPHORUS: CPT | Performed by: EMERGENCY MEDICINE

## 2023-01-01 PROCEDURE — 87040 BLOOD CULTURE FOR BACTERIA: CPT | Mod: 59 | Performed by: EMERGENCY MEDICINE

## 2023-01-01 PROCEDURE — D9220A PRA ANESTHESIA: ICD-10-PCS | Mod: CRNA,,, | Performed by: STUDENT IN AN ORGANIZED HEALTH CARE EDUCATION/TRAINING PROGRAM

## 2023-01-01 RX ORDER — GUAIFENESIN/DEXTROMETHORPHAN 100-10MG/5
10 SYRUP ORAL EVERY 4 HOURS PRN
Status: DISCONTINUED | OUTPATIENT
Start: 2023-01-01 | End: 2023-01-01 | Stop reason: HOSPADM

## 2023-01-01 RX ORDER — GLYCOPYRROLATE 0.2 MG/ML
0.1 INJECTION INTRAMUSCULAR; INTRAVENOUS 3 TIMES DAILY PRN
Status: DISCONTINUED | OUTPATIENT
Start: 2023-01-01 | End: 2023-01-01 | Stop reason: HOSPADM

## 2023-01-01 RX ORDER — IPRATROPIUM BROMIDE 0.5 MG/2.5ML
0.5 SOLUTION RESPIRATORY (INHALATION) ONCE
Status: COMPLETED | OUTPATIENT
Start: 2023-01-01 | End: 2023-01-01

## 2023-01-01 RX ORDER — BENZONATATE 100 MG/1
100 CAPSULE ORAL 3 TIMES DAILY PRN
Status: DISCONTINUED | OUTPATIENT
Start: 2023-01-01 | End: 2023-01-01 | Stop reason: HOSPADM

## 2023-01-01 RX ORDER — SODIUM CHLORIDE 0.9 % (FLUSH) 0.9 %
10 SYRINGE (ML) INJECTION EVERY 12 HOURS PRN
Status: DISCONTINUED | OUTPATIENT
Start: 2023-01-01 | End: 2023-01-01 | Stop reason: HOSPADM

## 2023-01-01 RX ORDER — PREDNISONE 20 MG/1
40 TABLET ORAL DAILY
Status: DISCONTINUED | OUTPATIENT
Start: 2023-01-01 | End: 2023-01-01

## 2023-01-01 RX ORDER — IPRATROPIUM BROMIDE AND ALBUTEROL SULFATE 2.5; .5 MG/3ML; MG/3ML
3 SOLUTION RESPIRATORY (INHALATION) EVERY 4 HOURS
Status: DISCONTINUED | OUTPATIENT
Start: 2023-01-01 | End: 2023-01-01

## 2023-01-01 RX ORDER — ATORVASTATIN CALCIUM 40 MG/1
80 TABLET, FILM COATED ORAL DAILY
Status: DISCONTINUED | OUTPATIENT
Start: 2023-01-01 | End: 2023-01-01

## 2023-01-01 RX ORDER — LANOLIN ALCOHOL/MO/W.PET/CERES
800 CREAM (GRAM) TOPICAL
Status: DISCONTINUED | OUTPATIENT
Start: 2023-01-01 | End: 2023-01-01 | Stop reason: HOSPADM

## 2023-01-01 RX ORDER — FENTANYL CITRATE 50 UG/ML
INJECTION, SOLUTION INTRAMUSCULAR; INTRAVENOUS
Status: DISCONTINUED | OUTPATIENT
Start: 2023-01-01 | End: 2023-01-01

## 2023-01-01 RX ORDER — IBUPROFEN 200 MG
16 TABLET ORAL
Status: DISCONTINUED | OUTPATIENT
Start: 2023-01-01 | End: 2023-01-01 | Stop reason: HOSPADM

## 2023-01-01 RX ORDER — SODIUM,POTASSIUM PHOSPHATES 280-250MG
2 POWDER IN PACKET (EA) ORAL
Status: DISCONTINUED | OUTPATIENT
Start: 2023-01-01 | End: 2023-01-01 | Stop reason: HOSPADM

## 2023-01-01 RX ORDER — SODIUM,POTASSIUM PHOSPHATES 280-250MG
2 POWDER IN PACKET (EA) ORAL
Status: DISCONTINUED | OUTPATIENT
Start: 2023-01-01 | End: 2023-01-01

## 2023-01-01 RX ORDER — POTASSIUM CHLORIDE 7.45 MG/ML
10 INJECTION INTRAVENOUS
Status: COMPLETED | OUTPATIENT
Start: 2023-01-01 | End: 2023-01-01

## 2023-01-01 RX ORDER — AMLODIPINE BESYLATE 5 MG/1
10 TABLET ORAL DAILY
Status: DISCONTINUED | OUTPATIENT
Start: 2023-01-01 | End: 2023-01-01 | Stop reason: HOSPADM

## 2023-01-01 RX ORDER — FUROSEMIDE 10 MG/ML
INJECTION INTRAMUSCULAR; INTRAVENOUS
Status: DISCONTINUED
Start: 2023-01-01 | End: 2023-01-01 | Stop reason: WASHOUT

## 2023-01-01 RX ORDER — NALOXONE HCL 0.4 MG/ML
0.02 VIAL (ML) INJECTION
Status: DISCONTINUED | OUTPATIENT
Start: 2023-01-01 | End: 2023-01-01 | Stop reason: HOSPADM

## 2023-01-01 RX ORDER — SODIUM CHLORIDE 0.9 % (FLUSH) 0.9 %
10 SYRINGE (ML) INJECTION
Status: DISCONTINUED | OUTPATIENT
Start: 2023-01-01 | End: 2023-12-22 | Stop reason: HOSPADM

## 2023-01-01 RX ORDER — ACETAMINOPHEN 325 MG/1
650 TABLET ORAL EVERY 4 HOURS PRN
Status: DISCONTINUED | OUTPATIENT
Start: 2023-01-01 | End: 2023-01-01 | Stop reason: HOSPADM

## 2023-01-01 RX ORDER — SODIUM CHLORIDE 0.9 % (FLUSH) 0.9 %
10 SYRINGE (ML) INJECTION
Status: DISCONTINUED | OUTPATIENT
Start: 2023-01-01 | End: 2023-01-01 | Stop reason: HOSPADM

## 2023-01-01 RX ORDER — MORPHINE SULFATE 15 MG/1
15 TABLET, FILM COATED, EXTENDED RELEASE ORAL EVERY 12 HOURS
Refills: 0
Start: 2023-01-01 | End: 2023-01-01 | Stop reason: SDUPTHER

## 2023-01-01 RX ORDER — FUROSEMIDE 40 MG/1
40 TABLET ORAL 2 TIMES DAILY
Status: DISCONTINUED | OUTPATIENT
Start: 2023-01-01 | End: 2023-01-01 | Stop reason: HOSPADM

## 2023-01-01 RX ORDER — CEFEPIME HYDROCHLORIDE 1 G/50ML
2 INJECTION, SOLUTION INTRAVENOUS EVERY 8 HOURS
Qty: 750 ML | Refills: 0 | Status: ON HOLD | OUTPATIENT
Start: 2023-01-01 | End: 2023-01-01

## 2023-01-01 RX ORDER — FAMOTIDINE 10 MG/ML
20 INJECTION INTRAVENOUS DAILY
Status: DISCONTINUED | OUTPATIENT
Start: 2023-01-01 | End: 2023-01-01 | Stop reason: HOSPADM

## 2023-01-01 RX ORDER — ACETAMINOPHEN 325 MG/1
650 TABLET ORAL EVERY 8 HOURS PRN
Status: DISCONTINUED | OUTPATIENT
Start: 2023-01-01 | End: 2023-01-01 | Stop reason: HOSPADM

## 2023-01-01 RX ORDER — LANOLIN ALCOHOL/MO/W.PET/CERES
800 CREAM (GRAM) TOPICAL
Status: DISCONTINUED | OUTPATIENT
Start: 2023-01-01 | End: 2023-01-01

## 2023-01-01 RX ORDER — OSELTAMIVIR PHOSPHATE 75 MG/1
75 CAPSULE ORAL DAILY
Status: COMPLETED | OUTPATIENT
Start: 2023-01-01 | End: 2023-01-01

## 2023-01-01 RX ORDER — METOPROLOL TARTRATE 50 MG/1
50 TABLET ORAL 3 TIMES DAILY
Status: DISCONTINUED | OUTPATIENT
Start: 2023-01-01 | End: 2023-01-01

## 2023-01-01 RX ORDER — CILOSTAZOL 50 MG/1
100 TABLET ORAL 2 TIMES DAILY
Status: DISCONTINUED | OUTPATIENT
Start: 2023-01-01 | End: 2023-01-01 | Stop reason: HOSPADM

## 2023-01-01 RX ORDER — OXYCODONE HYDROCHLORIDE 5 MG/1
10 TABLET ORAL EVERY 6 HOURS PRN
Status: DISCONTINUED | OUTPATIENT
Start: 2023-01-01 | End: 2023-01-01 | Stop reason: HOSPADM

## 2023-01-01 RX ORDER — MUPIROCIN 20 MG/G
OINTMENT TOPICAL 2 TIMES DAILY
Status: DISPENSED | OUTPATIENT
Start: 2023-01-01 | End: 2023-01-01

## 2023-01-01 RX ORDER — SIMETHICONE 80 MG
1 TABLET,CHEWABLE ORAL 4 TIMES DAILY PRN
Status: DISCONTINUED | OUTPATIENT
Start: 2023-01-01 | End: 2023-01-01 | Stop reason: HOSPADM

## 2023-01-01 RX ORDER — MAG HYDROX/ALUMINUM HYD/SIMETH 200-200-20
30 SUSPENSION, ORAL (FINAL DOSE FORM) ORAL 4 TIMES DAILY PRN
Status: DISCONTINUED | OUTPATIENT
Start: 2023-01-01 | End: 2023-01-01 | Stop reason: HOSPADM

## 2023-01-01 RX ORDER — POTASSIUM CHLORIDE 20 MEQ/1
20 TABLET, EXTENDED RELEASE ORAL DAILY
Status: DISCONTINUED | OUTPATIENT
Start: 2023-01-01 | End: 2023-01-01

## 2023-01-01 RX ORDER — HYDROMORPHONE HYDROCHLORIDE 2 MG/ML
0.2 INJECTION, SOLUTION INTRAMUSCULAR; INTRAVENOUS; SUBCUTANEOUS EVERY 5 MIN PRN
Status: DISCONTINUED | OUTPATIENT
Start: 2023-01-01 | End: 2023-01-01 | Stop reason: HOSPADM

## 2023-01-01 RX ORDER — CILOSTAZOL 100 MG/1
100 TABLET ORAL 2 TIMES DAILY
Status: DISCONTINUED | OUTPATIENT
Start: 2023-01-01 | End: 2023-01-01 | Stop reason: HOSPADM

## 2023-01-01 RX ORDER — MAGNESIUM SULFATE HEPTAHYDRATE 40 MG/ML
2 INJECTION, SOLUTION INTRAVENOUS ONCE
Status: COMPLETED | OUTPATIENT
Start: 2023-01-01 | End: 2023-01-01

## 2023-01-01 RX ORDER — POTASSIUM CHLORIDE 750 MG/1
10 TABLET, EXTENDED RELEASE ORAL DAILY
COMMUNITY
Start: 2023-01-01

## 2023-01-01 RX ORDER — ONDANSETRON 2 MG/ML
8 INJECTION INTRAMUSCULAR; INTRAVENOUS
Status: COMPLETED | OUTPATIENT
Start: 2023-01-01 | End: 2023-01-01

## 2023-01-01 RX ORDER — PROCHLORPERAZINE EDISYLATE 5 MG/ML
5 INJECTION INTRAMUSCULAR; INTRAVENOUS EVERY 6 HOURS PRN
Status: DISCONTINUED | OUTPATIENT
Start: 2023-01-01 | End: 2023-01-01 | Stop reason: HOSPADM

## 2023-01-01 RX ORDER — IPRATROPIUM BROMIDE AND ALBUTEROL SULFATE 2.5; .5 MG/3ML; MG/3ML
3 SOLUTION RESPIRATORY (INHALATION) ONCE
Status: COMPLETED | OUTPATIENT
Start: 2023-01-01 | End: 2023-01-01

## 2023-01-01 RX ORDER — ATORVASTATIN CALCIUM 40 MG/1
80 TABLET, FILM COATED ORAL DAILY
Status: DISCONTINUED | OUTPATIENT
Start: 2023-01-01 | End: 2023-01-01 | Stop reason: HOSPADM

## 2023-01-01 RX ORDER — IPRATROPIUM BROMIDE AND ALBUTEROL SULFATE 2.5; .5 MG/3ML; MG/3ML
3 SOLUTION RESPIRATORY (INHALATION) EVERY 4 HOURS PRN
Status: DISCONTINUED | OUTPATIENT
Start: 2023-01-01 | End: 2023-01-01 | Stop reason: HOSPADM

## 2023-01-01 RX ORDER — OLANZAPINE 5 MG/1
5 TABLET, ORALLY DISINTEGRATING ORAL NIGHTLY
Qty: 30 TABLET | Refills: 11
Start: 2023-01-01 | End: 2024-08-24

## 2023-01-01 RX ORDER — OLANZAPINE 5 MG/1
5 TABLET, ORALLY DISINTEGRATING ORAL NIGHTLY
Status: DISCONTINUED | OUTPATIENT
Start: 2023-01-01 | End: 2023-01-01

## 2023-01-01 RX ORDER — CILOSTAZOL 50 MG/1
100 TABLET ORAL 2 TIMES DAILY
Status: DISCONTINUED | OUTPATIENT
Start: 2023-01-01 | End: 2023-01-01

## 2023-01-01 RX ORDER — ASPIRIN 325 MG
325 TABLET ORAL
Status: COMPLETED | OUTPATIENT
Start: 2023-01-01 | End: 2023-01-01

## 2023-01-01 RX ORDER — SODIUM CHLORIDE 0.9 % (FLUSH) 0.9 %
10 SYRINGE (ML) INJECTION EVERY 6 HOURS PRN
Status: DISCONTINUED | OUTPATIENT
Start: 2023-01-01 | End: 2023-01-01 | Stop reason: HOSPADM

## 2023-01-01 RX ORDER — HYDROCODONE BITARTRATE AND ACETAMINOPHEN 10; 325 MG/1; MG/1
1 TABLET ORAL EVERY 6 HOURS PRN
Status: DISCONTINUED | OUTPATIENT
Start: 2023-01-01 | End: 2023-01-01 | Stop reason: HOSPADM

## 2023-01-01 RX ORDER — ONDANSETRON 2 MG/ML
4 INJECTION INTRAMUSCULAR; INTRAVENOUS EVERY 8 HOURS PRN
Status: DISCONTINUED | OUTPATIENT
Start: 2023-01-01 | End: 2023-01-01 | Stop reason: HOSPADM

## 2023-01-01 RX ORDER — OXYCODONE HYDROCHLORIDE 10 MG/1
10 TABLET ORAL EVERY 4 HOURS PRN
Status: DISCONTINUED | OUTPATIENT
Start: 2023-01-01 | End: 2023-01-01

## 2023-01-01 RX ORDER — ACETAMINOPHEN 325 MG/1
650 TABLET ORAL EVERY 4 HOURS PRN
Status: DISCONTINUED | OUTPATIENT
Start: 2023-01-01 | End: 2023-01-01

## 2023-01-01 RX ORDER — PREDNISONE 20 MG/1
40 TABLET ORAL DAILY
Status: DISCONTINUED | OUTPATIENT
Start: 2023-01-01 | End: 2023-01-01 | Stop reason: HOSPADM

## 2023-01-01 RX ORDER — TALC
6 POWDER (GRAM) TOPICAL NIGHTLY PRN
Status: DISCONTINUED | OUTPATIENT
Start: 2023-01-01 | End: 2023-01-01

## 2023-01-01 RX ORDER — METOPROLOL TARTRATE 50 MG/1
50 TABLET ORAL 3 TIMES DAILY
Status: DISCONTINUED | OUTPATIENT
Start: 2023-01-01 | End: 2023-01-01 | Stop reason: HOSPADM

## 2023-01-01 RX ORDER — FUROSEMIDE 10 MG/ML
80 INJECTION INTRAMUSCULAR; INTRAVENOUS
Status: COMPLETED | OUTPATIENT
Start: 2023-01-01 | End: 2023-01-01

## 2023-01-01 RX ORDER — POTASSIUM CHLORIDE 20 MEQ/1
40 TABLET, EXTENDED RELEASE ORAL
Status: COMPLETED | OUTPATIENT
Start: 2023-01-01 | End: 2023-01-01

## 2023-01-01 RX ORDER — CEFEPIME HYDROCHLORIDE 1 G/50ML
2 INJECTION, SOLUTION INTRAVENOUS
Status: DISCONTINUED | OUTPATIENT
Start: 2023-01-01 | End: 2023-01-01 | Stop reason: HOSPADM

## 2023-01-01 RX ORDER — DOXYCYCLINE HYCLATE 100 MG
100 TABLET ORAL EVERY 12 HOURS
Status: DISCONTINUED | OUTPATIENT
Start: 2023-01-01 | End: 2023-01-01 | Stop reason: HOSPADM

## 2023-01-01 RX ORDER — SODIUM CHLORIDE, SODIUM LACTATE, POTASSIUM CHLORIDE, CALCIUM CHLORIDE 600; 310; 30; 20 MG/100ML; MG/100ML; MG/100ML; MG/100ML
INJECTION, SOLUTION INTRAVENOUS CONTINUOUS
Status: DISCONTINUED | OUTPATIENT
Start: 2023-01-01 | End: 2023-01-01

## 2023-01-01 RX ORDER — POLYETHYLENE GLYCOL 3350 17 G/17G
17 POWDER, FOR SOLUTION ORAL DAILY
Status: DISCONTINUED | OUTPATIENT
Start: 2023-01-01 | End: 2023-01-01

## 2023-01-01 RX ORDER — SODIUM CHLORIDE 0.9 % (FLUSH) 0.9 %
3 SYRINGE (ML) INJECTION
Status: DISCONTINUED | OUTPATIENT
Start: 2023-01-01 | End: 2023-01-01 | Stop reason: HOSPADM

## 2023-01-01 RX ORDER — METOPROLOL TARTRATE 25 MG/1
25 TABLET, FILM COATED ORAL 3 TIMES DAILY
Status: DISCONTINUED | OUTPATIENT
Start: 2023-01-01 | End: 2023-01-01

## 2023-01-01 RX ORDER — OXYCODONE HYDROCHLORIDE 15 MG/1
TABLET ORAL
Qty: 40 TABLET | Refills: 0 | Status: ON HOLD | OUTPATIENT
Start: 2023-01-01 | End: 2023-01-01 | Stop reason: HOSPADM

## 2023-01-01 RX ORDER — HYDROCODONE BITARTRATE AND ACETAMINOPHEN 5; 325 MG/1; MG/1
1 TABLET ORAL EVERY 8 HOURS PRN
Qty: 9 TABLET | Refills: 0 | Status: SHIPPED | OUTPATIENT
Start: 2023-01-01 | End: 2023-01-01 | Stop reason: ALTCHOICE

## 2023-01-01 RX ORDER — OXYCODONE HYDROCHLORIDE 10 MG/1
10 TABLET ORAL EVERY 6 HOURS PRN
Status: DISCONTINUED | OUTPATIENT
Start: 2023-01-01 | End: 2023-01-01

## 2023-01-01 RX ORDER — FOLIC ACID 1 MG/1
1 TABLET ORAL DAILY
Status: DISCONTINUED | OUTPATIENT
Start: 2023-01-01 | End: 2023-01-01

## 2023-01-01 RX ORDER — CLOPIDOGREL BISULFATE 75 MG/1
75 TABLET ORAL DAILY
Status: DISCONTINUED | OUTPATIENT
Start: 2023-01-01 | End: 2023-01-01 | Stop reason: HOSPADM

## 2023-01-01 RX ORDER — IPRATROPIUM BROMIDE AND ALBUTEROL SULFATE 2.5; .5 MG/3ML; MG/3ML
3 SOLUTION RESPIRATORY (INHALATION) EVERY 4 HOURS
Status: DISCONTINUED | OUTPATIENT
Start: 2023-01-01 | End: 2023-01-01 | Stop reason: HOSPADM

## 2023-01-01 RX ORDER — BISACODYL 10 MG
10 SUPPOSITORY, RECTAL RECTAL DAILY PRN
Status: DISCONTINUED | OUTPATIENT
Start: 2023-01-01 | End: 2023-01-01 | Stop reason: HOSPADM

## 2023-01-01 RX ORDER — TALC
6 POWDER (GRAM) TOPICAL NIGHTLY PRN
Status: DISCONTINUED | OUTPATIENT
Start: 2023-01-01 | End: 2023-01-01 | Stop reason: HOSPADM

## 2023-01-01 RX ORDER — DILTIAZEM HYDROCHLORIDE 5 MG/ML
INJECTION INTRAVENOUS
Status: DISPENSED
Start: 2023-01-01 | End: 2023-01-01

## 2023-01-01 RX ORDER — ALBUTEROL SULFATE 2.5 MG/.5ML
5 SOLUTION RESPIRATORY (INHALATION)
Status: COMPLETED | OUTPATIENT
Start: 2023-01-01 | End: 2023-01-01

## 2023-01-01 RX ORDER — NAPROXEN SODIUM 220 MG/1
81 TABLET, FILM COATED ORAL DAILY
Status: DISCONTINUED | OUTPATIENT
Start: 2023-01-01 | End: 2023-01-01 | Stop reason: HOSPADM

## 2023-01-01 RX ORDER — IPRATROPIUM BROMIDE AND ALBUTEROL SULFATE 2.5; .5 MG/3ML; MG/3ML
3 SOLUTION RESPIRATORY (INHALATION)
Status: DISCONTINUED | OUTPATIENT
Start: 2023-01-01 | End: 2023-01-01

## 2023-01-01 RX ORDER — INSULIN ASPART 100 [IU]/ML
0-5 INJECTION, SOLUTION INTRAVENOUS; SUBCUTANEOUS
Status: DISCONTINUED | OUTPATIENT
Start: 2023-01-01 | End: 2023-01-01 | Stop reason: HOSPADM

## 2023-01-01 RX ORDER — GLUCAGON 1 MG
1 KIT INJECTION
Status: DISCONTINUED | OUTPATIENT
Start: 2023-01-01 | End: 2023-01-01 | Stop reason: HOSPADM

## 2023-01-01 RX ORDER — ASPIRIN 81 MG/1
81 TABLET ORAL DAILY
Status: DISCONTINUED | OUTPATIENT
Start: 2023-01-01 | End: 2023-01-01

## 2023-01-01 RX ORDER — MORPHINE SULFATE 15 MG/1
15 TABLET, FILM COATED, EXTENDED RELEASE ORAL EVERY 12 HOURS
Qty: 14 TABLET | Refills: 0 | Status: SHIPPED | OUTPATIENT
Start: 2023-01-01 | End: 2023-09-02

## 2023-01-01 RX ORDER — POLYETHYLENE GLYCOL 3350 17 G/17G
17 POWDER, FOR SOLUTION ORAL DAILY
Status: DISCONTINUED | OUTPATIENT
Start: 2023-01-01 | End: 2023-01-01 | Stop reason: HOSPADM

## 2023-01-01 RX ORDER — LIDOCAINE 50 MG/G
1 PATCH TOPICAL
Status: DISCONTINUED | OUTPATIENT
Start: 2023-01-01 | End: 2023-01-01 | Stop reason: HOSPADM

## 2023-01-01 RX ORDER — LORAZEPAM 2 MG/ML
1 INJECTION INTRAMUSCULAR
Status: DISCONTINUED | OUTPATIENT
Start: 2023-01-01 | End: 2023-01-01 | Stop reason: HOSPADM

## 2023-01-01 RX ORDER — LORAZEPAM 0.5 MG/1
1 TABLET ORAL EVERY 4 HOURS PRN
Status: DISCONTINUED | OUTPATIENT
Start: 2023-01-01 | End: 2023-01-01

## 2023-01-01 RX ORDER — OXYCODONE HYDROCHLORIDE 10 MG/1
10 TABLET ORAL EVERY 4 HOURS PRN
Qty: 30 TABLET | Refills: 0 | Status: SHIPPED | OUTPATIENT
Start: 2023-01-01 | End: 2023-09-25

## 2023-01-01 RX ORDER — ONDANSETRON 2 MG/ML
8 INJECTION INTRAMUSCULAR; INTRAVENOUS EVERY 8 HOURS PRN
Status: DISCONTINUED | OUTPATIENT
Start: 2023-01-01 | End: 2023-01-01 | Stop reason: HOSPADM

## 2023-01-01 RX ORDER — POTASSIUM CHLORIDE 20 MEQ/1
20 TABLET, EXTENDED RELEASE ORAL DAILY
Qty: 14 TABLET | Refills: 0 | Status: SHIPPED | OUTPATIENT
Start: 2023-01-01 | End: 2023-01-01

## 2023-01-01 RX ORDER — MORPHINE SULFATE 2 MG/ML
2 INJECTION, SOLUTION INTRAMUSCULAR; INTRAVENOUS
Status: DISCONTINUED | OUTPATIENT
Start: 2023-01-01 | End: 2023-01-01

## 2023-01-01 RX ORDER — IBUPROFEN 200 MG
24 TABLET ORAL
Status: DISCONTINUED | OUTPATIENT
Start: 2023-01-01 | End: 2023-01-01 | Stop reason: HOSPADM

## 2023-01-01 RX ORDER — ATORVASTATIN CALCIUM 40 MG/1
40 TABLET, FILM COATED ORAL DAILY
Status: DISCONTINUED | OUTPATIENT
Start: 2023-01-01 | End: 2023-01-01

## 2023-01-01 RX ORDER — AMOXICILLIN 250 MG
1 CAPSULE ORAL 2 TIMES DAILY
Status: DISCONTINUED | OUTPATIENT
Start: 2023-01-01 | End: 2023-01-01

## 2023-01-01 RX ORDER — DOXYCYCLINE HYCLATE 100 MG
100 TABLET ORAL EVERY 12 HOURS
Status: DISCONTINUED | OUTPATIENT
Start: 2023-01-01 | End: 2023-01-01

## 2023-01-01 RX ORDER — CILOSTAZOL 100 MG/1
100 TABLET ORAL 2 TIMES DAILY
Qty: 60 TABLET | Refills: 11 | Status: SHIPPED | OUTPATIENT
Start: 2023-01-01

## 2023-01-01 RX ORDER — GUAIFENESIN 600 MG/1
600 TABLET, EXTENDED RELEASE ORAL 2 TIMES DAILY
Status: DISCONTINUED | OUTPATIENT
Start: 2023-01-01 | End: 2023-01-01 | Stop reason: HOSPADM

## 2023-01-01 RX ORDER — ONDANSETRON 2 MG/ML
4 INJECTION INTRAMUSCULAR; INTRAVENOUS EVERY 8 HOURS PRN
Status: DISCONTINUED | OUTPATIENT
Start: 2023-01-01 | End: 2023-01-01

## 2023-01-01 RX ORDER — SODIUM,POTASSIUM PHOSPHATES 280-250MG
2 POWDER IN PACKET (EA) ORAL ONCE
Status: COMPLETED | OUTPATIENT
Start: 2023-01-01 | End: 2023-01-01

## 2023-01-01 RX ORDER — IPRATROPIUM BROMIDE AND ALBUTEROL SULFATE 2.5; .5 MG/3ML; MG/3ML
3 SOLUTION RESPIRATORY (INHALATION)
Status: DISCONTINUED | OUTPATIENT
Start: 2023-01-01 | End: 2023-01-01 | Stop reason: HOSPADM

## 2023-01-01 RX ORDER — MORPHINE SULFATE 15 MG/1
15 TABLET, FILM COATED, EXTENDED RELEASE ORAL EVERY 12 HOURS
Status: DISCONTINUED | OUTPATIENT
Start: 2023-01-01 | End: 2023-01-01

## 2023-01-01 RX ORDER — PREDNISONE 20 MG/1
40 TABLET ORAL DAILY
Qty: 4 TABLET | Refills: 0 | Status: SHIPPED | OUTPATIENT
Start: 2023-01-01 | End: 2023-01-01

## 2023-01-01 RX ORDER — HEPARIN 100 UNIT/ML
500 SYRINGE INTRAVENOUS
Status: DISCONTINUED | OUTPATIENT
Start: 2023-01-01 | End: 2023-12-22 | Stop reason: HOSPADM

## 2023-01-01 RX ORDER — OXYCODONE HYDROCHLORIDE 15 MG/1
TABLET ORAL
Qty: 40 TABLET | Refills: 0 | Status: SHIPPED | OUTPATIENT
Start: 2023-01-01 | End: 2023-01-01 | Stop reason: SDUPTHER

## 2023-01-01 RX ORDER — FUROSEMIDE 10 MG/ML
80 INJECTION INTRAMUSCULAR; INTRAVENOUS
Status: DISCONTINUED | OUTPATIENT
Start: 2023-01-01 | End: 2023-01-01

## 2023-01-01 RX ORDER — DEXTROSE MONOHYDRATE, SODIUM CHLORIDE, AND POTASSIUM CHLORIDE 50; 2.98; 4.5 G/1000ML; G/1000ML; G/1000ML
INJECTION, SOLUTION INTRAVENOUS CONTINUOUS
Status: DISCONTINUED | OUTPATIENT
Start: 2023-01-01 | End: 2023-01-01

## 2023-01-01 RX ORDER — DOXYCYCLINE HYCLATE 100 MG
100 TABLET ORAL EVERY 12 HOURS
Qty: 4 TABLET | Refills: 0 | Status: SHIPPED | OUTPATIENT
Start: 2023-01-01 | End: 2023-01-01

## 2023-01-01 RX ORDER — PREDNISONE 20 MG/1
TABLET ORAL
Qty: 5 TABLET | Refills: 0 | Status: SHIPPED | OUTPATIENT
Start: 2023-01-01 | End: 2023-01-01

## 2023-01-01 RX ORDER — LIDOCAINE HYDROCHLORIDE 10 MG/ML
INJECTION INFILTRATION; PERINEURAL
Status: DISCONTINUED | OUTPATIENT
Start: 2023-01-01 | End: 2023-01-01 | Stop reason: HOSPADM

## 2023-01-01 RX ORDER — OSELTAMIVIR PHOSPHATE 75 MG/1
75 CAPSULE ORAL
Status: COMPLETED | OUTPATIENT
Start: 2023-01-01 | End: 2023-01-01

## 2023-01-01 RX ORDER — GADOBUTROL 604.72 MG/ML
7 INJECTION INTRAVENOUS
Status: COMPLETED | OUTPATIENT
Start: 2023-01-01 | End: 2023-01-01

## 2023-01-01 RX ORDER — IPRATROPIUM BROMIDE AND ALBUTEROL SULFATE 2.5; .5 MG/3ML; MG/3ML
3 SOLUTION RESPIRATORY (INHALATION)
Status: COMPLETED | OUTPATIENT
Start: 2023-01-01 | End: 2023-01-01

## 2023-01-01 RX ORDER — FUROSEMIDE 10 MG/ML
40 INJECTION INTRAMUSCULAR; INTRAVENOUS ONCE
Status: COMPLETED | OUTPATIENT
Start: 2023-01-01 | End: 2023-01-01

## 2023-01-01 RX ORDER — DOXYCYCLINE HYCLATE 100 MG
100 TABLET ORAL EVERY 12 HOURS
Qty: 6 TABLET | Refills: 0 | Status: SHIPPED | OUTPATIENT
Start: 2023-01-01 | End: 2023-01-01

## 2023-01-01 RX ORDER — ATORVASTATIN CALCIUM 80 MG/1
80 TABLET, FILM COATED ORAL DAILY
Qty: 90 TABLET | Refills: 3 | Status: SHIPPED | OUTPATIENT
Start: 2023-01-01 | End: 2024-05-21

## 2023-01-01 RX ORDER — MORPHINE SULFATE 1 MG/ML
0-10 INJECTION, SOLUTION INTRAVENOUS CONTINUOUS
Status: DISCONTINUED | OUTPATIENT
Start: 2023-01-01 | End: 2023-01-01 | Stop reason: HOSPADM

## 2023-01-01 RX ORDER — HEPARIN 100 UNIT/ML
500 SYRINGE INTRAVENOUS
Status: DISCONTINUED | OUTPATIENT
Start: 2023-01-01 | End: 2023-01-01 | Stop reason: HOSPADM

## 2023-01-01 RX ORDER — PROPOFOL 10 MG/ML
VIAL (ML) INTRAVENOUS CONTINUOUS PRN
Status: DISCONTINUED | OUTPATIENT
Start: 2023-01-01 | End: 2023-01-01

## 2023-01-01 RX ORDER — ALBUTEROL SULFATE 0.83 MG/ML
3 SOLUTION RESPIRATORY (INHALATION) EVERY 6 HOURS PRN
COMMUNITY
Start: 2023-01-01

## 2023-01-01 RX ORDER — METHYLPREDNISOLONE SOD SUCC 125 MG
125 VIAL (EA) INJECTION
Status: COMPLETED | OUTPATIENT
Start: 2023-01-01 | End: 2023-01-01

## 2023-01-01 RX ORDER — HYDROCODONE BITARTRATE AND ACETAMINOPHEN 5; 325 MG/1; MG/1
1 TABLET ORAL EVERY 6 HOURS PRN
Status: DISCONTINUED | OUTPATIENT
Start: 2023-01-01 | End: 2023-01-01 | Stop reason: HOSPADM

## 2023-01-01 RX ADMIN — METOPROLOL TARTRATE 50 MG: 50 TABLET, FILM COATED ORAL at 10:03

## 2023-01-01 RX ADMIN — POTASSIUM CHLORIDE 20 MEQ: 1500 TABLET, EXTENDED RELEASE ORAL at 09:08

## 2023-01-01 RX ADMIN — IPRATROPIUM BROMIDE AND ALBUTEROL SULFATE 3 ML: 2.5; .5 SOLUTION RESPIRATORY (INHALATION) at 03:03

## 2023-01-01 RX ADMIN — OSELTAMIVIR PHOSPHATE 75 MG: 75 CAPSULE ORAL at 01:03

## 2023-01-01 RX ADMIN — IPRATROPIUM BROMIDE AND ALBUTEROL SULFATE 3 ML: .5; 3 SOLUTION RESPIRATORY (INHALATION) at 07:08

## 2023-01-01 RX ADMIN — PIPERACILLIN AND TAZOBACTAM 4.5 G: 4; .5 INJECTION, POWDER, LYOPHILIZED, FOR SOLUTION INTRAVENOUS; PARENTERAL at 08:03

## 2023-01-01 RX ADMIN — IPRATROPIUM BROMIDE AND ALBUTEROL SULFATE 3 ML: .5; 3 SOLUTION RESPIRATORY (INHALATION) at 08:08

## 2023-01-01 RX ADMIN — CLOPIDOGREL BISULFATE 75 MG: 75 TABLET ORAL at 08:03

## 2023-01-01 RX ADMIN — IPRATROPIUM BROMIDE AND ALBUTEROL SULFATE 3 ML: 2.5; .5 SOLUTION RESPIRATORY (INHALATION) at 07:03

## 2023-01-01 RX ADMIN — CEFEPIME HYDROCHLORIDE 2 G: 2 INJECTION, SOLUTION INTRAVENOUS at 10:03

## 2023-01-01 RX ADMIN — IPRATROPIUM BROMIDE AND ALBUTEROL SULFATE 3 ML: 2.5; .5 SOLUTION RESPIRATORY (INHALATION) at 04:02

## 2023-01-01 RX ADMIN — APIXABAN 5 MG: 5 TABLET, FILM COATED ORAL at 09:03

## 2023-01-01 RX ADMIN — IOHEXOL 85 ML: 350 INJECTION, SOLUTION INTRAVENOUS at 10:02

## 2023-01-01 RX ADMIN — IPRATROPIUM BROMIDE AND ALBUTEROL SULFATE 3 ML: 2.5; .5 SOLUTION RESPIRATORY (INHALATION) at 04:01

## 2023-01-01 RX ADMIN — APIXABAN 10 MG: 5 TABLET, FILM COATED ORAL at 09:08

## 2023-01-01 RX ADMIN — ATORVASTATIN CALCIUM 80 MG: 40 TABLET, FILM COATED ORAL at 08:03

## 2023-01-01 RX ADMIN — ATORVASTATIN CALCIUM 80 MG: 40 TABLET, FILM COATED ORAL at 09:03

## 2023-01-01 RX ADMIN — SENNOSIDES AND DOCUSATE SODIUM 1 TABLET: 50; 8.6 TABLET ORAL at 01:08

## 2023-01-01 RX ADMIN — PIPERACILLIN AND TAZOBACTAM 4.5 G: 4; .5 INJECTION, POWDER, LYOPHILIZED, FOR SOLUTION INTRAVENOUS; PARENTERAL at 10:03

## 2023-01-01 RX ADMIN — METHYLPREDNISOLONE SODIUM SUCCINATE 40 MG: 40 INJECTION, POWDER, FOR SOLUTION INTRAMUSCULAR; INTRAVENOUS at 09:01

## 2023-01-01 RX ADMIN — IPRATROPIUM BROMIDE AND ALBUTEROL SULFATE 3 ML: 2.5; .5 SOLUTION RESPIRATORY (INHALATION) at 01:02

## 2023-01-01 RX ADMIN — IPRATROPIUM BROMIDE AND ALBUTEROL SULFATE 3 ML: .5; 3 SOLUTION RESPIRATORY (INHALATION) at 09:08

## 2023-01-01 RX ADMIN — CILOSTAZOL 100 MG: 50 TABLET ORAL at 08:08

## 2023-01-01 RX ADMIN — IPRATROPIUM BROMIDE 0.5 MG: 0.5 SOLUTION RESPIRATORY (INHALATION) at 05:02

## 2023-01-01 RX ADMIN — POTASSIUM BICARBONATE 60 MEQ: 391 TABLET, EFFERVESCENT ORAL at 05:03

## 2023-01-01 RX ADMIN — METOPROLOL TARTRATE 50 MG: 50 TABLET, FILM COATED ORAL at 08:03

## 2023-01-01 RX ADMIN — MORPHINE SULFATE 15 MG: 15 TABLET, EXTENDED RELEASE ORAL at 08:08

## 2023-01-01 RX ADMIN — IPRATROPIUM BROMIDE AND ALBUTEROL SULFATE 3 ML: 2.5; .5 SOLUTION RESPIRATORY (INHALATION) at 04:03

## 2023-01-01 RX ADMIN — SODIUM CHLORIDE 500 ML: 9 INJECTION, SOLUTION INTRAVENOUS at 10:03

## 2023-01-01 RX ADMIN — FUROSEMIDE 40 MG: 40 TABLET ORAL at 09:01

## 2023-01-01 RX ADMIN — APIXABAN 5 MG: 5 TABLET, FILM COATED ORAL at 08:03

## 2023-01-01 RX ADMIN — SENNOSIDES AND DOCUSATE SODIUM 1 TABLET: 50; 8.6 TABLET ORAL at 09:08

## 2023-01-01 RX ADMIN — INSULIN ASPART 2 UNITS: 100 INJECTION, SOLUTION INTRAVENOUS; SUBCUTANEOUS at 04:03

## 2023-01-01 RX ADMIN — ASPIRIN 81 MG CHEWABLE TABLET 81 MG: 81 TABLET CHEWABLE at 08:03

## 2023-01-01 RX ADMIN — IPRATROPIUM BROMIDE AND ALBUTEROL SULFATE 3 ML: 2.5; .5 SOLUTION RESPIRATORY (INHALATION) at 08:03

## 2023-01-01 RX ADMIN — APIXABAN 10 MG: 5 TABLET, FILM COATED ORAL at 08:08

## 2023-01-01 RX ADMIN — CILOSTAZOL 100 MG: 100 TABLET ORAL at 09:03

## 2023-01-01 RX ADMIN — OLANZAPINE 5 MG: 5 TABLET, ORALLY DISINTEGRATING ORAL at 01:08

## 2023-01-01 RX ADMIN — IPRATROPIUM BROMIDE AND ALBUTEROL SULFATE 3 ML: 2.5; .5 SOLUTION RESPIRATORY (INHALATION) at 11:03

## 2023-01-01 RX ADMIN — IPRATROPIUM BROMIDE AND ALBUTEROL SULFATE 3 ML: 2.5; .5 SOLUTION RESPIRATORY (INHALATION) at 12:03

## 2023-01-01 RX ADMIN — ATORVASTATIN CALCIUM 80 MG: 40 TABLET, FILM COATED ORAL at 10:08

## 2023-01-01 RX ADMIN — METOPROLOL TARTRATE 50 MG: 50 TABLET, FILM COATED ORAL at 03:01

## 2023-01-01 RX ADMIN — FOLIC ACID 1 MG: 1 TABLET ORAL at 10:08

## 2023-01-01 RX ADMIN — IOHEXOL 100 ML: 350 INJECTION, SOLUTION INTRAVENOUS at 12:01

## 2023-01-01 RX ADMIN — IPRATROPIUM BROMIDE AND ALBUTEROL SULFATE 3 ML: 2.5; .5 SOLUTION RESPIRATORY (INHALATION) at 12:01

## 2023-01-01 RX ADMIN — METHYLPREDNISOLONE SODIUM SUCCINATE 125 MG: 125 INJECTION, POWDER, FOR SOLUTION INTRAMUSCULAR; INTRAVENOUS at 06:02

## 2023-01-01 RX ADMIN — MORPHINE SULFATE 15 MG: 15 TABLET, EXTENDED RELEASE ORAL at 09:08

## 2023-01-01 RX ADMIN — FUROSEMIDE 40 MG: 40 TABLET ORAL at 09:03

## 2023-01-01 RX ADMIN — METOPROLOL TARTRATE 50 MG: 50 TABLET, FILM COATED ORAL at 08:08

## 2023-01-01 RX ADMIN — IPRATROPIUM BROMIDE AND ALBUTEROL SULFATE 3 ML: 2.5; .5 SOLUTION RESPIRATORY (INHALATION) at 06:03

## 2023-01-01 RX ADMIN — PIPERACILLIN AND TAZOBACTAM 4.5 G: 4; .5 INJECTION, POWDER, LYOPHILIZED, FOR SOLUTION INTRAVENOUS; PARENTERAL at 07:08

## 2023-01-01 RX ADMIN — FUROSEMIDE 40 MG: 40 TABLET ORAL at 10:03

## 2023-01-01 RX ADMIN — PIPERACILLIN AND TAZOBACTAM 4.5 G: 4; .5 INJECTION, POWDER, LYOPHILIZED, FOR SOLUTION INTRAVENOUS; PARENTERAL at 04:08

## 2023-01-01 RX ADMIN — FENTANYL CITRATE 25 MCG: 50 INJECTION, SOLUTION INTRAMUSCULAR; INTRAVENOUS at 01:03

## 2023-01-01 RX ADMIN — ASPIRIN 81 MG CHEWABLE TABLET 81 MG: 81 TABLET CHEWABLE at 09:01

## 2023-01-01 RX ADMIN — APIXABAN 5 MG: 5 TABLET, FILM COATED ORAL at 11:08

## 2023-01-01 RX ADMIN — SENNOSIDES AND DOCUSATE SODIUM 1 TABLET: 50; 8.6 TABLET ORAL at 08:08

## 2023-01-01 RX ADMIN — POLYETHYLENE GLYCOL 3350 17 G: 17 POWDER, FOR SOLUTION ORAL at 08:03

## 2023-01-01 RX ADMIN — CILOSTAZOL 100 MG: 100 TABLET ORAL at 09:01

## 2023-01-01 RX ADMIN — GUAIFENESIN 600 MG: 600 TABLET, EXTENDED RELEASE ORAL at 08:03

## 2023-01-01 RX ADMIN — METOPROLOL TARTRATE 50 MG: 50 TABLET, FILM COATED ORAL at 09:03

## 2023-01-01 RX ADMIN — CILOSTAZOL 100 MG: 100 TABLET ORAL at 08:03

## 2023-01-01 RX ADMIN — CEFEPIME HYDROCHLORIDE 2 G: 2 INJECTION, SOLUTION INTRAVENOUS at 01:03

## 2023-01-01 RX ADMIN — POLYETHYLENE GLYCOL 3350 17 G: 17 POWDER, FOR SOLUTION ORAL at 10:08

## 2023-01-01 RX ADMIN — APIXABAN 5 MG: 5 TABLET, FILM COATED ORAL at 09:01

## 2023-01-01 RX ADMIN — IPRATROPIUM BROMIDE AND ALBUTEROL SULFATE 3 ML: 2.5; .5 SOLUTION RESPIRATORY (INHALATION) at 11:01

## 2023-01-01 RX ADMIN — GEMCITABINE 2000 MG: 38 INJECTION, SOLUTION INTRAVENOUS at 11:01

## 2023-01-01 RX ADMIN — GADOBUTROL 7 ML: 604.72 INJECTION INTRAVENOUS at 09:08

## 2023-01-01 RX ADMIN — METOPROLOL TARTRATE 50 MG: 50 TABLET, FILM COATED ORAL at 04:08

## 2023-01-01 RX ADMIN — OLANZAPINE 5 MG: 5 TABLET, ORALLY DISINTEGRATING ORAL at 08:08

## 2023-01-01 RX ADMIN — CLOPIDOGREL BISULFATE 75 MG: 75 TABLET ORAL at 09:01

## 2023-01-01 RX ADMIN — METOPROLOL TARTRATE 50 MG: 50 TABLET, FILM COATED ORAL at 02:03

## 2023-01-01 RX ADMIN — IPRATROPIUM BROMIDE AND ALBUTEROL SULFATE 3 ML: .5; 3 SOLUTION RESPIRATORY (INHALATION) at 03:08

## 2023-01-01 RX ADMIN — GLYCOPYRROLATE 0.1 MG: 0.2 INJECTION INTRAMUSCULAR; INTRAVENOUS at 10:08

## 2023-01-01 RX ADMIN — ATORVASTATIN CALCIUM 80 MG: 40 TABLET, FILM COATED ORAL at 09:08

## 2023-01-01 RX ADMIN — FAMOTIDINE 20 MG: 10 INJECTION INTRAVENOUS at 08:03

## 2023-01-01 RX ADMIN — METOPROLOL TARTRATE 50 MG: 50 TABLET, FILM COATED ORAL at 05:03

## 2023-01-01 RX ADMIN — PIPERACILLIN AND TAZOBACTAM 4.5 G: 4; .5 INJECTION, POWDER, LYOPHILIZED, FOR SOLUTION INTRAVENOUS; PARENTERAL at 11:03

## 2023-01-01 RX ADMIN — POTASSIUM BICARBONATE 50 MEQ: 977.5 TABLET, EFFERVESCENT ORAL at 12:03

## 2023-01-01 RX ADMIN — ASPIRIN 81 MG: 81 TABLET, COATED ORAL at 10:08

## 2023-01-01 RX ADMIN — IPRATROPIUM BROMIDE AND ALBUTEROL SULFATE 3 ML: 2.5; .5 SOLUTION RESPIRATORY (INHALATION) at 01:03

## 2023-01-01 RX ADMIN — TIOTROPIUM BROMIDE INHALATION SPRAY 2 PUFF: 3.12 SPRAY, METERED RESPIRATORY (INHALATION) at 08:08

## 2023-01-01 RX ADMIN — POTASSIUM CHLORIDE 10 MEQ: 7.46 INJECTION, SOLUTION INTRAVENOUS at 04:08

## 2023-01-01 RX ADMIN — ASPIRIN 81 MG: 81 TABLET, COATED ORAL at 09:08

## 2023-01-01 RX ADMIN — ATORVASTATIN CALCIUM 80 MG: 40 TABLET, FILM COATED ORAL at 08:01

## 2023-01-01 RX ADMIN — DOXYCYCLINE HYCLATE 100 MG: 100 TABLET, COATED ORAL at 09:02

## 2023-01-01 RX ADMIN — TIOTROPIUM BROMIDE INHALATION SPRAY 2 PUFF: 3.12 SPRAY, METERED RESPIRATORY (INHALATION) at 09:08

## 2023-01-01 RX ADMIN — CLOPIDOGREL BISULFATE 75 MG: 75 TABLET ORAL at 09:02

## 2023-01-01 RX ADMIN — MUPIROCIN: 20 OINTMENT TOPICAL at 10:08

## 2023-01-01 RX ADMIN — Medication 10 ML: at 09:03

## 2023-01-01 RX ADMIN — METOPROLOL TARTRATE 50 MG: 50 TABLET, FILM COATED ORAL at 09:01

## 2023-01-01 RX ADMIN — AMLODIPINE BESYLATE 10 MG: 5 TABLET ORAL at 08:01

## 2023-01-01 RX ADMIN — IPRATROPIUM BROMIDE AND ALBUTEROL SULFATE 3 ML: 2.5; .5 SOLUTION RESPIRATORY (INHALATION) at 08:01

## 2023-01-01 RX ADMIN — CEFEPIME HYDROCHLORIDE 2 G: 2 INJECTION, SOLUTION INTRAVENOUS at 09:03

## 2023-01-01 RX ADMIN — IPRATROPIUM BROMIDE AND ALBUTEROL SULFATE 3 ML: 2.5; .5 SOLUTION RESPIRATORY (INHALATION) at 05:03

## 2023-01-01 RX ADMIN — FUROSEMIDE 40 MG: 40 TABLET ORAL at 08:03

## 2023-01-01 RX ADMIN — IPRATROPIUM BROMIDE AND ALBUTEROL SULFATE 3 ML: .5; 3 SOLUTION RESPIRATORY (INHALATION) at 01:08

## 2023-01-01 RX ADMIN — SODIUM CHLORIDE, POTASSIUM CHLORIDE, SODIUM LACTATE AND CALCIUM CHLORIDE: 600; 310; 30; 20 INJECTION, SOLUTION INTRAVENOUS at 02:08

## 2023-01-01 RX ADMIN — IPRATROPIUM BROMIDE AND ALBUTEROL SULFATE 3 ML: 2.5; .5 SOLUTION RESPIRATORY (INHALATION) at 10:01

## 2023-01-01 RX ADMIN — PREDNISONE 40 MG: 20 TABLET ORAL at 09:03

## 2023-01-01 RX ADMIN — APIXABAN 10 MG: 5 TABLET, FILM COATED ORAL at 10:08

## 2023-01-01 RX ADMIN — METOPROLOL TARTRATE 50 MG: 50 TABLET, FILM COATED ORAL at 09:02

## 2023-01-01 RX ADMIN — FOLIC ACID 1 MG: 1 TABLET ORAL at 08:08

## 2023-01-01 RX ADMIN — OXYCODONE HYDROCHLORIDE 10 MG: 10 TABLET ORAL at 08:08

## 2023-01-01 RX ADMIN — METOPROLOL TARTRATE 50 MG: 50 TABLET, FILM COATED ORAL at 09:08

## 2023-01-01 RX ADMIN — POTASSIUM CHLORIDE 10 MEQ: 7.46 INJECTION, SOLUTION INTRAVENOUS at 06:08

## 2023-01-01 RX ADMIN — PIPERACILLIN AND TAZOBACTAM 4.5 G: 4; .5 INJECTION, POWDER, LYOPHILIZED, FOR SOLUTION INTRAVENOUS; PARENTERAL at 11:08

## 2023-01-01 RX ADMIN — POLYETHYLENE GLYCOL 3350 17 G: 17 POWDER, FOR SOLUTION ORAL at 08:08

## 2023-01-01 RX ADMIN — ASPIRIN 81 MG CHEWABLE TABLET 81 MG: 81 TABLET CHEWABLE at 10:03

## 2023-01-01 RX ADMIN — FAMOTIDINE 20 MG: 10 INJECTION INTRAVENOUS at 10:03

## 2023-01-01 RX ADMIN — PROPOFOL 50 MCG/KG/MIN: 10 INJECTION, EMULSION INTRAVENOUS at 01:03

## 2023-01-01 RX ADMIN — DOXYCYCLINE HYCLATE 100 MG: 100 TABLET, COATED ORAL at 08:01

## 2023-01-01 RX ADMIN — APIXABAN 5 MG: 5 TABLET, FILM COATED ORAL at 10:08

## 2023-01-01 RX ADMIN — PIPERACILLIN AND TAZOBACTAM 4.5 G: 4; .5 INJECTION, POWDER, LYOPHILIZED, FOR SOLUTION INTRAVENOUS; PARENTERAL at 06:03

## 2023-01-01 RX ADMIN — PIPERACILLIN AND TAZOBACTAM 4.5 G: 4; .5 INJECTION, POWDER, LYOPHILIZED, FOR SOLUTION INTRAVENOUS; PARENTERAL at 07:03

## 2023-01-01 RX ADMIN — IPRATROPIUM BROMIDE AND ALBUTEROL SULFATE 3 ML: 2.5; .5 SOLUTION RESPIRATORY (INHALATION) at 07:01

## 2023-01-01 RX ADMIN — MAGNESIUM SULFATE HEPTAHYDRATE 2 G: 40 INJECTION, SOLUTION INTRAVENOUS at 09:03

## 2023-01-01 RX ADMIN — DOXYCYCLINE HYCLATE 100 MG: 100 TABLET, COATED ORAL at 08:03

## 2023-01-01 RX ADMIN — CLOPIDOGREL BISULFATE 75 MG: 75 TABLET ORAL at 09:03

## 2023-01-01 RX ADMIN — METHYLPREDNISOLONE SODIUM SUCCINATE 40 MG: 40 INJECTION, POWDER, FOR SOLUTION INTRAMUSCULAR; INTRAVENOUS at 08:01

## 2023-01-01 RX ADMIN — DOXYCYCLINE HYCLATE 100 MG: 100 TABLET, COATED ORAL at 09:01

## 2023-01-01 RX ADMIN — ASPIRIN 325 MG ORAL TABLET 325 MG: 325 PILL ORAL at 05:01

## 2023-01-01 RX ADMIN — VANCOMYCIN HYDROCHLORIDE 1000 MG: 1 INJECTION, POWDER, LYOPHILIZED, FOR SOLUTION INTRAVENOUS at 11:03

## 2023-01-01 RX ADMIN — POTASSIUM CHLORIDE 10 MEQ: 7.46 INJECTION, SOLUTION INTRAVENOUS at 12:08

## 2023-01-01 RX ADMIN — GUAIFENESIN AND DEXTROMETHORPHAN 10 ML: 100; 10 SYRUP ORAL at 09:03

## 2023-01-01 RX ADMIN — GUAIFENESIN 600 MG: 600 TABLET, EXTENDED RELEASE ORAL at 09:03

## 2023-01-01 RX ADMIN — OLANZAPINE 5 MG: 5 TABLET, ORALLY DISINTEGRATING ORAL at 09:08

## 2023-01-01 RX ADMIN — PREDNISONE 40 MG: 20 TABLET ORAL at 03:01

## 2023-01-01 RX ADMIN — PREDNISONE 40 MG: 20 TABLET ORAL at 09:01

## 2023-01-01 RX ADMIN — MORPHINE SULFATE 15 MG: 15 TABLET, EXTENDED RELEASE ORAL at 12:08

## 2023-01-01 RX ADMIN — METOPROLOL TARTRATE 50 MG: 50 TABLET, FILM COATED ORAL at 05:02

## 2023-01-01 RX ADMIN — LORAZEPAM 1 MG: 2 INJECTION INTRAMUSCULAR; INTRAVENOUS at 10:08

## 2023-01-01 RX ADMIN — AMLODIPINE BESYLATE 10 MG: 5 TABLET ORAL at 09:01

## 2023-01-01 RX ADMIN — CILOSTAZOL 100 MG: 50 TABLET ORAL at 09:08

## 2023-01-01 RX ADMIN — ALBUTEROL SULFATE 5 MG: 2.5 SOLUTION RESPIRATORY (INHALATION) at 05:02

## 2023-01-01 RX ADMIN — PREDNISONE 40 MG: 20 TABLET ORAL at 08:03

## 2023-01-01 RX ADMIN — DOXYCYCLINE HYCLATE 100 MG: 100 TABLET, COATED ORAL at 11:01

## 2023-01-01 RX ADMIN — IPRATROPIUM BROMIDE AND ALBUTEROL SULFATE 3 ML: 2.5; .5 SOLUTION RESPIRATORY (INHALATION) at 10:03

## 2023-01-01 RX ADMIN — APIXABAN 5 MG: 5 TABLET, FILM COATED ORAL at 09:02

## 2023-01-01 RX ADMIN — APIXABAN 5 MG: 5 TABLET, FILM COATED ORAL at 09:08

## 2023-01-01 RX ADMIN — SENNOSIDES AND DOCUSATE SODIUM 1 TABLET: 50; 8.6 TABLET ORAL at 10:08

## 2023-01-01 RX ADMIN — ASPIRIN 81 MG: 81 TABLET, COATED ORAL at 01:08

## 2023-01-01 RX ADMIN — CILOSTAZOL 100 MG: 50 TABLET ORAL at 10:08

## 2023-01-01 RX ADMIN — VANCOMYCIN HYDROCHLORIDE 1000 MG: 1 INJECTION, POWDER, LYOPHILIZED, FOR SOLUTION INTRAVENOUS at 12:03

## 2023-01-01 RX ADMIN — AMLODIPINE BESYLATE 10 MG: 5 TABLET ORAL at 10:03

## 2023-01-01 RX ADMIN — IPRATROPIUM BROMIDE AND ALBUTEROL SULFATE 3 ML: 2.5; .5 SOLUTION RESPIRATORY (INHALATION) at 09:02

## 2023-01-01 RX ADMIN — POTASSIUM BICARBONATE 60 MEQ: 391 TABLET, EFFERVESCENT ORAL at 09:03

## 2023-01-01 RX ADMIN — APIXABAN 5 MG: 5 TABLET, FILM COATED ORAL at 08:08

## 2023-01-01 RX ADMIN — MORPHINE SULFATE 15 MG: 15 TABLET, EXTENDED RELEASE ORAL at 10:08

## 2023-01-01 RX ADMIN — CEFEPIME HYDROCHLORIDE 2 G: 2 INJECTION, SOLUTION INTRAVENOUS at 05:03

## 2023-01-01 RX ADMIN — GLYCOPYRROLATE 0.1 MG: 0.2 INJECTION INTRAMUSCULAR; INTRAVENOUS at 03:08

## 2023-01-01 RX ADMIN — LIDOCAINE 1 PATCH: 50 PATCH TOPICAL at 02:03

## 2023-01-01 RX ADMIN — PREDNISONE 40 MG: 20 TABLET ORAL at 09:02

## 2023-01-01 RX ADMIN — OSELTAMIVIR PHOSPHATE 75 MG: 75 CAPSULE ORAL at 08:03

## 2023-01-01 RX ADMIN — POLYETHYLENE GLYCOL 3350 17 G: 17 POWDER, FOR SOLUTION ORAL at 09:08

## 2023-01-01 RX ADMIN — CLOPIDOGREL BISULFATE 75 MG: 75 TABLET ORAL at 08:01

## 2023-01-01 RX ADMIN — METOPROLOL TARTRATE 50 MG: 50 TABLET, FILM COATED ORAL at 10:08

## 2023-01-01 RX ADMIN — ATORVASTATIN CALCIUM 40 MG: 10 TABLET, FILM COATED ORAL at 01:08

## 2023-01-01 RX ADMIN — DOXYCYCLINE HYCLATE 100 MG: 100 TABLET, COATED ORAL at 09:03

## 2023-01-01 RX ADMIN — POTASSIUM CHLORIDE 40 MEQ: 1500 TABLET, EXTENDED RELEASE ORAL at 07:08

## 2023-01-01 RX ADMIN — CILOSTAZOL 100 MG: 100 TABLET ORAL at 10:03

## 2023-01-01 RX ADMIN — FUROSEMIDE 40 MG: 40 TABLET ORAL at 09:02

## 2023-01-01 RX ADMIN — ONDANSETRON 8 MG: 2 INJECTION INTRAMUSCULAR; INTRAVENOUS at 03:01

## 2023-01-01 RX ADMIN — ASPIRIN 81 MG CHEWABLE TABLET 81 MG: 81 TABLET CHEWABLE at 08:01

## 2023-01-01 RX ADMIN — METOPROLOL TARTRATE 50 MG: 50 TABLET, FILM COATED ORAL at 05:08

## 2023-01-01 RX ADMIN — IPRATROPIUM BROMIDE AND ALBUTEROL SULFATE 3 ML: 2.5; .5 SOLUTION RESPIRATORY (INHALATION) at 03:01

## 2023-01-01 RX ADMIN — CILOSTAZOL 100 MG: 100 TABLET ORAL at 09:02

## 2023-01-01 RX ADMIN — IPRATROPIUM BROMIDE AND ALBUTEROL SULFATE 3 ML: 2.5; .5 SOLUTION RESPIRATORY (INHALATION) at 08:02

## 2023-01-01 RX ADMIN — ASPIRIN 81 MG CHEWABLE TABLET 81 MG: 81 TABLET CHEWABLE at 09:03

## 2023-01-01 RX ADMIN — MUPIROCIN: 20 OINTMENT TOPICAL at 08:08

## 2023-01-01 RX ADMIN — POTASSIUM CHLORIDE 20 MEQ: 1500 TABLET, EXTENDED RELEASE ORAL at 11:08

## 2023-01-01 RX ADMIN — ATORVASTATIN CALCIUM 80 MG: 40 TABLET, FILM COATED ORAL at 11:08

## 2023-01-01 RX ADMIN — GEMCITABINE 2000 MG: 38 INJECTION, SOLUTION INTRAVENOUS at 04:01

## 2023-01-01 RX ADMIN — OSELTAMIVIR PHOSPHATE 75 MG: 75 CAPSULE ORAL at 09:03

## 2023-01-01 RX ADMIN — APIXABAN 5 MG: 5 TABLET, FILM COATED ORAL at 10:03

## 2023-01-01 RX ADMIN — HEPARIN 500 UNITS: 100 SYRINGE at 01:01

## 2023-01-01 RX ADMIN — POTASSIUM BICARBONATE 35 MEQ: 391 TABLET, EFFERVESCENT ORAL at 06:03

## 2023-01-01 RX ADMIN — AMLODIPINE BESYLATE 10 MG: 5 TABLET ORAL at 09:03

## 2023-01-01 RX ADMIN — FOLIC ACID 1 MG: 1 TABLET ORAL at 09:08

## 2023-01-01 RX ADMIN — FUROSEMIDE 40 MG: 10 INJECTION, SOLUTION INTRAMUSCULAR; INTRAVENOUS at 04:01

## 2023-01-01 RX ADMIN — METOPROLOL TARTRATE 50 MG: 50 TABLET, FILM COATED ORAL at 02:08

## 2023-01-01 RX ADMIN — IPRATROPIUM BROMIDE AND ALBUTEROL SULFATE 3 ML: .5; 3 SOLUTION RESPIRATORY (INHALATION) at 09:01

## 2023-01-01 RX ADMIN — IPRATROPIUM BROMIDE AND ALBUTEROL SULFATE 3 ML: .5; 3 SOLUTION RESPIRATORY (INHALATION) at 02:08

## 2023-01-01 RX ADMIN — VANCOMYCIN HYDROCHLORIDE 1500 MG: 1.5 INJECTION, POWDER, LYOPHILIZED, FOR SOLUTION INTRAVENOUS at 11:03

## 2023-01-01 RX ADMIN — VANCOMYCIN HYDROCHLORIDE 1250 MG: 1.25 INJECTION, POWDER, LYOPHILIZED, FOR SOLUTION INTRAVENOUS at 12:08

## 2023-01-01 RX ADMIN — LORAZEPAM 1 MG: 0.5 TABLET ORAL at 03:08

## 2023-01-01 RX ADMIN — ATORVASTATIN CALCIUM 80 MG: 40 TABLET, FILM COATED ORAL at 09:01

## 2023-01-01 RX ADMIN — FUROSEMIDE 40 MG: 40 TABLET ORAL at 10:02

## 2023-01-01 RX ADMIN — ATORVASTATIN CALCIUM 80 MG: 40 TABLET, FILM COATED ORAL at 08:08

## 2023-01-01 RX ADMIN — CEFEPIME HYDROCHLORIDE 2 G: 2 INJECTION, SOLUTION INTRAVENOUS at 06:03

## 2023-01-01 RX ADMIN — FUROSEMIDE 80 MG: 10 INJECTION, SOLUTION INTRAMUSCULAR; INTRAVENOUS at 06:02

## 2023-01-01 RX ADMIN — MUPIROCIN: 20 OINTMENT TOPICAL at 09:08

## 2023-01-01 RX ADMIN — SODIUM CHLORIDE, SODIUM LACTATE, POTASSIUM CHLORIDE, AND CALCIUM CHLORIDE: .6; .31; .03; .02 INJECTION, SOLUTION INTRAVENOUS at 01:03

## 2023-01-01 RX ADMIN — APIXABAN 5 MG: 5 TABLET, FILM COATED ORAL at 08:01

## 2023-01-01 RX ADMIN — ONDANSETRON 8 MG: 2 INJECTION INTRAMUSCULAR; INTRAVENOUS at 10:01

## 2023-01-01 RX ADMIN — IPRATROPIUM BROMIDE AND ALBUTEROL SULFATE 3 ML: .5; 3 SOLUTION RESPIRATORY (INHALATION) at 11:08

## 2023-01-01 RX ADMIN — PIPERACILLIN AND TAZOBACTAM 4.5 G: 4; .5 INJECTION, POWDER, LYOPHILIZED, FOR SOLUTION INTRAVENOUS; PARENTERAL at 03:03

## 2023-01-01 RX ADMIN — CILOSTAZOL 100 MG: 100 TABLET ORAL at 08:01

## 2023-01-01 RX ADMIN — POTASSIUM CHLORIDE 20 MEQ: 1500 TABLET, EXTENDED RELEASE ORAL at 08:08

## 2023-01-01 RX ADMIN — METOPROLOL TARTRATE 50 MG: 50 TABLET, FILM COATED ORAL at 04:03

## 2023-01-01 RX ADMIN — ASPIRIN 81 MG: 81 TABLET, COATED ORAL at 11:08

## 2023-01-01 RX ADMIN — METOPROLOL TARTRATE 50 MG: 50 TABLET, FILM COATED ORAL at 08:01

## 2023-01-01 RX ADMIN — SODIUM CHLORIDE, POTASSIUM CHLORIDE, SODIUM LACTATE AND CALCIUM CHLORIDE: 600; 310; 30; 20 INJECTION, SOLUTION INTRAVENOUS at 08:08

## 2023-01-01 RX ADMIN — POTASSIUM CHLORIDE 20 MEQ: 1500 TABLET, EXTENDED RELEASE ORAL at 10:08

## 2023-01-01 RX ADMIN — IOHEXOL 80 ML: 350 INJECTION, SOLUTION INTRAVENOUS at 10:08

## 2023-01-01 RX ADMIN — TUBERCULIN PURIFIED PROTEIN DERIVATIVE 5 UNITS: 5 INJECTION, SOLUTION INTRADERMAL at 06:08

## 2023-01-01 RX ADMIN — ATORVASTATIN CALCIUM 80 MG: 40 TABLET, FILM COATED ORAL at 10:03

## 2023-01-01 RX ADMIN — AMLODIPINE BESYLATE 10 MG: 5 TABLET ORAL at 08:03

## 2023-01-01 RX ADMIN — PIPERACILLIN AND TAZOBACTAM 4.5 G: 4; .5 INJECTION, POWDER, LYOPHILIZED, FOR SOLUTION INTRAVENOUS; PARENTERAL at 04:03

## 2023-01-01 RX ADMIN — LORAZEPAM 1 MG: 2 INJECTION INTRAMUSCULAR; INTRAVENOUS at 03:08

## 2023-01-01 RX ADMIN — Medication 10 ML: at 04:01

## 2023-01-01 RX ADMIN — METOPROLOL TARTRATE 50 MG: 50 TABLET, FILM COATED ORAL at 11:08

## 2023-01-01 RX ADMIN — OXYCODONE HYDROCHLORIDE 10 MG: 10 TABLET ORAL at 03:08

## 2023-01-01 RX ADMIN — PIPERACILLIN AND TAZOBACTAM 4.5 G: 4; .5 INJECTION, POWDER, LYOPHILIZED, FOR SOLUTION INTRAVENOUS; PARENTERAL at 01:03

## 2023-01-01 RX ADMIN — MAGNESIUM SULFATE HEPTAHYDRATE 2 G: 40 INJECTION, SOLUTION INTRAVENOUS at 01:08

## 2023-01-01 RX ADMIN — FAMOTIDINE 20 MG: 10 INJECTION INTRAVENOUS at 09:03

## 2023-01-01 RX ADMIN — FUROSEMIDE 40 MG: 40 TABLET ORAL at 08:01

## 2023-01-01 RX ADMIN — METOPROLOL TARTRATE 50 MG: 50 TABLET, FILM COATED ORAL at 04:01

## 2023-01-01 RX ADMIN — ASPIRIN 81 MG: 81 TABLET, COATED ORAL at 08:08

## 2023-01-01 RX ADMIN — GUAIFENESIN 600 MG: 600 TABLET, EXTENDED RELEASE ORAL at 10:03

## 2023-01-01 RX ADMIN — CEFEPIME HYDROCHLORIDE 2 G: 2 INJECTION, SOLUTION INTRAVENOUS at 04:03

## 2023-01-01 RX ADMIN — SENNOSIDES AND DOCUSATE SODIUM 1 TABLET: 50; 8.6 TABLET ORAL at 11:08

## 2023-01-01 RX ADMIN — TIOTROPIUM BROMIDE INHALATION SPRAY 2 PUFF: 3.12 SPRAY, METERED RESPIRATORY (INHALATION) at 07:08

## 2023-01-01 RX ADMIN — DEXTROSE MONOHYDRATE, SODIUM CHLORIDE, AND POTASSIUM CHLORIDE: 50; 4.5; 2.98 INJECTION, SOLUTION INTRAVENOUS at 03:08

## 2023-01-01 RX ADMIN — IPRATROPIUM BROMIDE AND ALBUTEROL SULFATE 3 ML: .5; 3 SOLUTION RESPIRATORY (INHALATION) at 12:08

## 2023-01-01 RX ADMIN — IOHEXOL 75 ML: 350 INJECTION, SOLUTION INTRAVENOUS at 12:03

## 2023-01-01 RX ADMIN — Medication 4 MG/HR: at 07:08

## 2023-01-01 RX ADMIN — INSULIN ASPART 1 UNITS: 100 INJECTION, SOLUTION INTRAVENOUS; SUBCUTANEOUS at 09:03

## 2023-01-01 RX ADMIN — METOPROLOL TARTRATE 25 MG: 25 TABLET, FILM COATED ORAL at 08:08

## 2023-01-01 RX ADMIN — SODIUM CHLORIDE, POTASSIUM CHLORIDE, SODIUM LACTATE AND CALCIUM CHLORIDE 1900 ML: 600; 310; 30; 20 INJECTION, SOLUTION INTRAVENOUS at 11:03

## 2023-01-01 RX ADMIN — POTASSIUM BICARBONATE 25 MEQ: 977.5 TABLET, EFFERVESCENT ORAL at 07:01

## 2023-01-01 RX ADMIN — Medication 10 ML: at 08:03

## 2023-01-01 RX ADMIN — METOPROLOL TARTRATE 25 MG: 25 TABLET, FILM COATED ORAL at 09:08

## 2023-01-01 RX ADMIN — METOPROLOL TARTRATE 50 MG: 50 TABLET, FILM COATED ORAL at 03:03

## 2023-01-01 RX ADMIN — CILOSTAZOL 100 MG: 50 TABLET ORAL at 11:08

## 2023-01-01 RX ADMIN — MAGNESIUM SULFATE HEPTAHYDRATE 2 G: 40 INJECTION, SOLUTION INTRAVENOUS at 12:03

## 2023-01-01 RX ADMIN — AMLODIPINE BESYLATE 10 MG: 5 TABLET ORAL at 12:03

## 2023-01-01 RX ADMIN — ATORVASTATIN CALCIUM 80 MG: 40 TABLET, FILM COATED ORAL at 09:02

## 2023-01-01 RX ADMIN — POTASSIUM CHLORIDE 40 MEQ: 1500 TABLET, EXTENDED RELEASE ORAL at 12:01

## 2023-01-01 RX ADMIN — POLYETHYLENE GLYCOL 3350 17 G: 17 POWDER, FOR SOLUTION ORAL at 09:03

## 2023-01-01 RX ADMIN — METOPROLOL TARTRATE 50 MG: 50 TABLET, FILM COATED ORAL at 03:08

## 2023-01-01 RX ADMIN — FENTANYL CITRATE 50 MCG: 50 INJECTION, SOLUTION INTRAMUSCULAR; INTRAVENOUS at 01:03

## 2023-01-01 RX ADMIN — AMLODIPINE BESYLATE 10 MG: 5 TABLET ORAL at 09:02

## 2023-01-01 RX ADMIN — IPRATROPIUM BROMIDE AND ALBUTEROL SULFATE 3 ML: 2.5; .5 SOLUTION RESPIRATORY (INHALATION) at 01:01

## 2023-01-01 RX ADMIN — BARIUM SULFATE 450 ML: 20 SUSPENSION ORAL at 10:02

## 2023-01-01 RX ADMIN — POTASSIUM & SODIUM PHOSPHATES POWDER PACK 280-160-250 MG 2 PACKET: 280-160-250 PACK at 12:08

## 2023-01-01 RX ADMIN — POLYETHYLENE GLYCOL 3350 17 G: 17 POWDER, FOR SOLUTION ORAL at 09:01

## 2023-01-01 RX ADMIN — SODIUM CHLORIDE 1000 ML: 9 INJECTION, SOLUTION INTRAVENOUS at 12:08

## 2023-01-01 RX ADMIN — ASPIRIN 81 MG CHEWABLE TABLET 81 MG: 81 TABLET CHEWABLE at 09:02

## 2023-01-01 RX ADMIN — OXYCODONE HYDROCHLORIDE 10 MG: 10 TABLET ORAL at 01:08

## 2023-01-01 RX ADMIN — POLYETHYLENE GLYCOL 3350 17 G: 17 POWDER, FOR SOLUTION ORAL at 08:01

## 2023-01-01 RX ADMIN — FOLIC ACID 1 MG: 1 TABLET ORAL at 11:08

## 2023-01-01 RX ADMIN — OXYCODONE HYDROCHLORIDE 10 MG: 10 TABLET ORAL at 11:08

## 2023-01-01 RX ADMIN — POTASSIUM CHLORIDE 10 MEQ: 7.46 INJECTION, SOLUTION INTRAVENOUS at 05:08

## 2023-01-01 RX ADMIN — HEPARIN 500 UNITS: 100 SYRINGE at 04:01

## 2023-01-01 RX ADMIN — PIPERACILLIN AND TAZOBACTAM 4.5 G: 4; .5 INJECTION, POWDER, LYOPHILIZED, FOR SOLUTION INTRAVENOUS; PARENTERAL at 02:03

## 2023-01-01 RX ADMIN — SODIUM PHOSPHATE, MONOBASIC, MONOHYDRATE AND SODIUM PHOSPHATE, DIBASIC, ANHYDROUS 20.01 MMOL: 142; 276 INJECTION, SOLUTION INTRAVENOUS at 03:08

## 2023-01-01 RX ADMIN — DEXTROSE MONOHYDRATE, SODIUM CHLORIDE, AND POTASSIUM CHLORIDE: 50; 4.5; 2.98 INJECTION, SOLUTION INTRAVENOUS at 01:08

## 2023-01-01 RX ADMIN — POTASSIUM BICARBONATE 50 MEQ: 977.5 TABLET, EFFERVESCENT ORAL at 11:08

## 2023-01-01 RX ADMIN — OXYCODONE HYDROCHLORIDE 10 MG: 10 TABLET ORAL at 09:08

## 2023-01-07 NOTE — PLAN OF CARE
Patient arrived to unit for C15 D1 Gemzar infusion. Pt continues with decreased oral intake, [place]. 2L O2 per n/c on presentation. Pt O2 sats at home remain 89-92. Increased O2 to 3L and pt Sats 93-97. Cleared per Dr. Higgins pt can go home on 3L as well. Labs reviewed, pt cleared for chemo per Dr. Higgins. KCl 40 mEq po administered as well as 1L NS. New Rx of Kcl 20 mEq sent to [facility] in [place]. Plan of care reviewed, patient agreeable to plan.Zofran administered prior to Gemzar which infused over 30 mins. Patient tolerated infusion well. VSS. Discharge instructions reviewed, patient instructed to return 1/20. Patient left off unit in w/c escorted by spouse. Patient in NAD at time of discharge.

## 2023-01-18 NOTE — LETTER
January 18, 2023    Kashif Iverson  [address] [place] [address]             [facility] [address] [place] [address] Dear  Kashif Iverson,      I have been assigned as your  with [facility] Out[facility] Case Management Department. We received a referral to call you to discuss your medical history. I have attempted to reach you by telephone, but I was unsuccessful. Please call our department so that we can go over some questions with you regarding your health.     The Outpatient Case Management department can be reached at [phone] from 8:00am to 4:30pm on Monday thru Friday. [facility] also has a program where a nurse is available 24/7 to answer questions or provider medical advice. Their number is [phone].     Thanks,      Yumiko Rizo, RN  Outpatient Case Management  [phone]

## 2023-01-24 NOTE — TELEPHONE ENCOUNTER
1/22/2023 @ 7:40pm    Patient on Palliative Care NP's schedule tomorrow. Called patient to setup time for appointment;however, no answer so left message for wife to return call.      1/23/2023 @ 8:46 am  Called patient's wife on [phone] no answer so left a 2nd message on voicemail.       Message Sent to  Ayde Prieto to reschedule patient in next available opening.    Angelita Smith, MIKE, FNP-C  [email or web address]  [phone]

## 2023-01-28 NOTE — ED PROVIDER NOTES
Encounter Date: 1/28/2023    SCRIBE #1 NOTE: I, Patria Evans, am scribing for, and in the presence of,  Teresa Swanson DO.     History     Chief Complaint   Patient presents with    Chest Pain     Pt reports intermittent left sided chest pain x 1 hour. Reports being on 2L NC at home, states having to bump to 4L nc. Denies SOB. Hx stage 4 lung cancer.      Kashif Iverson is a 62 y.o. male with Hx of COPD, Combined systolic and diastolic heart failure, NSTEMI, Lung cancer (NSCLC), and others who presents to the ED for chief complaint of stabbing left sided chest pain intermittent for 1 hour. His chest pain lasts for a few seconds to a few minutes. Patient reports his pain is presently resolved. Denies any prior history of similar symptoms. His wife further reports SpO2 of 82% while on 2L nasal canula at home. He typically has SpO2 between 88%-98% on 2L. She says that she increased him to 4L on the drive to the ED. Patient's wife additionally endorses a productive cough which began yesterday. She feels his cough was brought on by the cold weather. Denies any associated fever or other symptoms. FHx of Maternal cardiac problems. He does not take aspirin daily. NKDA.     The history is provided by the patient and the spouse. No  was used.   Review of patient's allergies indicates:  No Known Allergies  Past Medical History:   Diagnosis Date    Chronic obstructive pulmonary disease with acute exacerbation 9/5/2022    Combined systolic and diastolic heart failure     Dependence on supplemental oxygen 5/24/2022    History of completed stroke 9/3/2019     09/03/2019 On CT exam    History of non-ST elevation myocardial infarction (NSTEMI) 2/22/2022    Hypertension     Lung cancer     NSCLC    Lung mass     left lung    Macrocytic anemia 8/24/2019    PAD (peripheral artery disease) 3/14/2022    LUIS FELIPE 3/11/22    Peripheral artery disease      Past Surgical History:   Procedure Laterality Date    BRONCHOSCOPY N/A  08/30/2019    Procedure: Bronchoscopy;  Surgeon: Miguel Diagnostic Provider;  Location: [facility] OR [address] [place] [address];  Service: Anesthesiology;  Laterality: N/A;    CORONARY ANGIOGRAPHY N/A 03/04/2022    Procedure: ANGIOGRAM, CORONARY ARTERY;  Surgeon: Robson Green MD;  Location: [facility] CATH LAB;  Service: Cardiology;  Laterality: N/A;    INSERTION OF TUNNELED CENTRAL VENOUS CATHETER (CVC) WITH SUBCUTANEOUS PORT       Family History   Problem Relation Age of Onset    Diabetes Mother     Heart defect Mother     Cancer Father     Cancer Sister     No Known Problems Son      Social History     Tobacco Use    Smoking status: Former     Packs/day: 1.00     Years: 25.00     Pack years: 25.00     Types: Cigarettes     Quit date: 2020     Years since quitting: 3.0    Smokeless tobacco: Never   Substance Use Topics    Alcohol use: Yes     Comment: 11/3/22: Occ.    Drug use: Never     Review of Systems   Constitutional:  Negative for fever.   HENT:  Negative for rhinorrhea and sore throat.    Respiratory:  Positive for cough. Negative for shortness of breath.         (+) Hypoxia.   Cardiovascular:  Positive for chest pain. Negative for leg swelling.   Skin:  Negative for rash.   Neurological:  Negative for numbness.   All other systems reviewed and are negative.    Physical Exam     Initial Vitals [01/28/23 1451]   BP Pulse Resp Temp SpO2   (!) 157/82 85 20 98 °F (36.7 °C) (!) 92 %      MAP       --         Patient gave consent to have physical exam performed.  Physical Exam    Nursing note and vitals reviewed.  Constitutional: He appears well-developed and well-nourished.   HENT:   Head: Normocephalic and atraumatic.   Right Ear: External ear normal.   Left Ear: External ear normal.   Nose: Nose normal.   Mouth/Throat: Oropharynx is clear and moist.   Eyes: Conjunctivae and EOM are normal. Pupils are equal, round, and reactive to light.   Neck: Neck supple.   Normal range of motion.  Cardiovascular:  Normal rate, regular rhythm and  normal heart sounds.     Exam reveals no gallop and no friction rub.       No murmur heard.  Pulmonary/Chest: Breath sounds normal. No respiratory distress. He has no wheezes. He has no rhonchi. He has no rales.   Abdominal: Abdomen is soft. Bowel sounds are normal. There is no abdominal tenderness. There is no rebound and no guarding.   Musculoskeletal:         General: No tenderness or edema. Normal range of motion.      Cervical back: Normal range of motion and neck supple.     Neurological: He is alert and oriented to person, place, and time. No cranial nerve deficit.   Skin: Skin is warm and dry. Capillary refill takes less than 2 seconds. No rash noted.   Psychiatric: He has a normal mood and affect. His behavior is normal.       ED Course   Critical Care    Date/Time: 1/28/2023 11:35 PM  Performed by: Teresa Swanson DO  Authorized by: Carroll Ambriz MD   Direct patient critical care time: 10 minutes  Additional history critical care time: 10 minutes  Ordering / reviewing critical care time: 10 minutes  Documentation critical care time: 10 minutes  Consulting other physicians critical care time: 6 minutes  Consult with family critical care time: 10 minutes  Total critical care time (exclusive of procedural time) : 56 minutes  Critical care was necessary to treat or prevent imminent or life-threatening deterioration of the following conditions: cardiac failure, circulatory failure and respiratory failure.  Critical care was time spent personally by me on the following activities: evaluation of patient's response to treatment, examination of patient, obtaining history from patient or surrogate, ordering and performing treatments and interventions, ordering and review of laboratory studies, ordering and review of radiographic studies, pulse oximetry, re-evaluation of patient's condition and review of old charts.      Labs Reviewed   CBC W/ AUTO DIFFERENTIAL - Abnormal; Notable for the following components:        Result Value    RBC 4.04 (*)     Hemoglobin 12.3 (*)     Hematocrit 35.0 (*)     RDW 15.9 (*)     MPV 8.9 (*)     Mono # 1.3 (*)     Mono % 27.2 (*)     All other components within normal limits   COMPREHENSIVE METABOLIC PANEL - Abnormal; Notable for the following components:    Potassium 3.1 (*)     Glucose 113 (*)     Albumin 3.2 (*)     All other components within normal limits   B-TYPE NATRIURETIC PEPTIDE - Abnormal; Notable for the following components:     (*)     All other components within normal limits   URINALYSIS, REFLEX TO URINE CULTURE - Abnormal; Notable for the following components:    Occult Blood UA 1+ (*)     All other components within normal limits    Narrative:     Specimen Source->Urine   URINALYSIS MICROSCOPIC - Abnormal; Notable for the following components:    RBC, UA 5 (*)     All other components within normal limits    Narrative:     Specimen Source->Urine   TROPONIN I   TROPONIN I   SARS-COV-2 RDRP GENE   POCT INFLUENZA A/B MOLECULAR     EKG Readings: (Independently Interpreted)   No STEMI. Rate of 84. Normal Sinus Rhythm. Left Axis. Abnormal EKG. QTc normal at 470. When compared to prior EKG dated 12/16/22 rate decreased by 35 bpm.      Imaging Results              X-Ray Chest AP Portable (Final result)  Result time 01/28/23 17:01:15      Final result by Delonte King MD (01/28/23 17:01:15)                   Impression:      Stable examination.      Electronically signed by: Delonte King MD  Date:    01/28/2023  Time:    17:01               Narrative:    EXAMINATION:  XR CHEST AP PORTABLE    CLINICAL HISTORY:  Chest Pain;    TECHNIQUE:  Single frontal view of the chest was performed.    COMPARISON:  12/20/2022.    FINDINGS:  There is stable appearance of right-sided chest port tip in the right atrium.  Monitoring EKG leads are present.    The trachea is unremarkable.  The cardiomediastinal silhouette is unchanged.  There is no significant pleural effusion on the right.  There  is unchanged left-sided pleural effusion.  There is no evidence of a pneumothorax.  There is no evidence of pneumomediastinum.  There are stable airspace opacities in the left hemithorax with associated architectural distortion.  No new airspace disease identified.  There are degenerative changes in the osseous structures.                                       Medications   amLODIPine tablet 10 mg (has no administration in time range)   apixaban tablet 5 mg (5 mg Oral Given 1/28/23 2151)   aspirin chewable tablet 81 mg (has no administration in time range)   atorvastatin tablet 80 mg (has no administration in time range)   cilostazoL tablet 100 mg (has no administration in time range)   clopidogreL tablet 75 mg (has no administration in time range)   furosemide tablet 40 mg (40 mg Oral Given 1/28/23 2152)   metoprolol tartrate (LOPRESSOR) tablet 50 mg (has no administration in time range)   sodium chloride 0.9% flush 10 mL (has no administration in time range)   albuterol-ipratropium 2.5 mg-0.5 mg/3 mL nebulizer solution 3 mL (has no administration in time range)   melatonin tablet 6 mg (has no administration in time range)   ondansetron injection 4 mg (has no administration in time range)   prochlorperazine injection Soln 5 mg (has no administration in time range)   polyethylene glycol packet 17 g (has no administration in time range)   bisacodyL suppository 10 mg (has no administration in time range)   acetaminophen tablet 650 mg (has no administration in time range)   simethicone chewable tablet 80 mg (has no administration in time range)   aluminum-magnesium hydroxide-simethicone 200-200-20 mg/5 mL suspension 30 mL (has no administration in time range)   acetaminophen tablet 650 mg (has no administration in time range)   HYDROcodone-acetaminophen 5-325 mg per tablet 1 tablet (has no administration in time range)   naloxone 0.4 mg/mL injection 0.02 mg (has no administration in time range)   potassium bicarbonate  disintegrating tablet 50 mEq (has no administration in time range)   potassium bicarbonate disintegrating tablet 35 mEq (has no administration in time range)   potassium bicarbonate disintegrating tablet 60 mEq (has no administration in time range)   magnesium oxide tablet 800 mg (has no administration in time range)   magnesium oxide tablet 800 mg (has no administration in time range)   potassium, sodium phosphates 280-160-250 mg packet 2 packet (has no administration in time range)   potassium, sodium phosphates 280-160-250 mg packet 2 packet (has no administration in time range)   potassium, sodium phosphates 280-160-250 mg packet 2 packet (has no administration in time range)   glucose chewable tablet 16 g (has no administration in time range)   glucose chewable tablet 24 g (has no administration in time range)   glucagon (human recombinant) injection 1 mg (has no administration in time range)   methylPREDNISolone sodium succinate injection 40 mg (40 mg Intravenous Given 1/28/23 2149)   albuterol-ipratropium 2.5 mg-0.5 mg/3 mL nebulizer solution 3 mL (has no administration in time range)   doxycycline tablet 100 mg (has no administration in time range)   dextrose 10% bolus 125 mL 125 mL (has no administration in time range)   dextrose 10% bolus 250 mL 250 mL (has no administration in time range)   aspirin tablet 325 mg (325 mg Oral Given 1/28/23 1741)   potassium bicarbonate disintegrating tablet 25 mEq (25 mEq Oral Given 1/28/23 1945)   albuterol-ipratropium 2.5 mg-0.5 mg/3 mL nebulizer solution 3 mL (3 mLs Nebulization Given 1/28/23 2100)     Medical Decision Making:   History:   Old Medical Records: I decided to obtain old medical records.  Independently Interpreted Test(s):   I have ordered and independently interpreted EKG Reading(s) - see prior notes  Clinical Tests:   Lab Tests: Ordered and Reviewed  Radiological Study: Ordered and Reviewed  Medical Tests: Ordered and Reviewed     This is an evaluation of  a 62 y.o. male that presents to the Emergency Department for   Chief Complaint   Patient presents with    Chest Pain     Pt reports intermittent left sided chest pain x 1 hour. Reports being on 2L NC at home, states having to bump to 4L nc. Denies SOB. Hx stage 4 lung cancer.        Physical Exam shows a non-toxic and well appearing patient. Breath sounds diminished, patient is requiring 4 L in the ED to maintain O2 sats greater than 88%. Heart sounds regular rate and rhythm. No murmurs. Abdomen soft and non tender. No palpable masses or bruits. . No dysphagia or vomiting and CXR negative for mediastinal air.     Differential diagnosis: Cardiac arrhythmia, STEMI, NSTEMI, Hypoxia, Respiratory failure, COVID, Influenza, Pneumonia    Cardiac monitor placed for chest pain. Monitor shows Normal Sinus Rhythm. Interpreted by Dr Teresa Swanson,     Vital Signs Are Reassuring. If available, previous records reviewed.     RESULTS:   CXR no acute findings.  Patient with increase oxygen demand resulting in hypoxia.  EKG interpreted by myself with no STEMI   Patient presents with chest pain .  Troponin is negative.  No STEMI on EKG and no acute issues on chest x-ray.  Will admit for serial cardiac enzymes, EKGs, and continued oxygen therapy.  Significant delay in obtaining CT PE study.  Will not delay admit to await CT completion.  Discussed pending CT with admitting physician.  ED Course: Treatment in the ED included Physical Exam and   Medications   amLODIPine tablet 10 mg (has no administration in time range)   apixaban tablet 5 mg (5 mg Oral Given 1/28/23 2151)   aspirin chewable tablet 81 mg (has no administration in time range)   atorvastatin tablet 80 mg (has no administration in time range)   cilostazoL tablet 100 mg (has no administration in time range)   clopidogreL tablet 75 mg (has no administration in time range)   furosemide tablet 40 mg (40 mg Oral Given 1/28/23 2152)   metoprolol tartrate (LOPRESSOR) tablet 50 mg  (has no administration in time range)   sodium chloride 0.9% flush 10 mL (has no administration in time range)   albuterol-ipratropium 2.5 mg-0.5 mg/3 mL nebulizer solution 3 mL (has no administration in time range)   melatonin tablet 6 mg (has no administration in time range)   ondansetron injection 4 mg (has no administration in time range)   prochlorperazine injection Soln 5 mg (has no administration in time range)   polyethylene glycol packet 17 g (has no administration in time range)   bisacodyL suppository 10 mg (has no administration in time range)   acetaminophen tablet 650 mg (has no administration in time range)   simethicone chewable tablet 80 mg (has no administration in time range)   aluminum-magnesium hydroxide-simethicone 200-200-20 mg/5 mL suspension 30 mL (has no administration in time range)   acetaminophen tablet 650 mg (has no administration in time range)   HYDROcodone-acetaminophen 5-325 mg per tablet 1 tablet (has no administration in time range)   naloxone 0.4 mg/mL injection 0.02 mg (has no administration in time range)   potassium bicarbonate disintegrating tablet 50 mEq (has no administration in time range)   potassium bicarbonate disintegrating tablet 35 mEq (has no administration in time range)   potassium bicarbonate disintegrating tablet 60 mEq (has no administration in time range)   magnesium oxide tablet 800 mg (has no administration in time range)   magnesium oxide tablet 800 mg (has no administration in time range)   potassium, sodium phosphates 280-160-250 mg packet 2 packet (has no administration in time range)   potassium, sodium phosphates 280-160-250 mg packet 2 packet (has no administration in time range)   potassium, sodium phosphates 280-160-250 mg packet 2 packet (has no administration in time range)   glucose chewable tablet 16 g (has no administration in time range)   glucose chewable tablet 24 g (has no administration in time range)   glucagon (human recombinant)  injection 1 mg (has no administration in time range)   methylPREDNISolone sodium succinate injection 40 mg (40 mg Intravenous Given 1/28/23 2149)   albuterol-ipratropium 2.5 mg-0.5 mg/3 mL nebulizer solution 3 mL (has no administration in time range)   doxycycline tablet 100 mg (has no administration in time range)   dextrose 10% bolus 125 mL 125 mL (has no administration in time range)   dextrose 10% bolus 250 mL 250 mL (has no administration in time range)   aspirin tablet 325 mg (325 mg Oral Given 1/28/23 1741)   potassium bicarbonate disintegrating tablet 25 mEq (25 mEq Oral Given 1/28/23 1945)   albuterol-ipratropium 2.5 mg-0.5 mg/3 mL nebulizer solution 3 mL (3 mLs Nebulization Given 1/28/23 2100)   Patient reports feeling better after treatment in the ER. Tolerating PO without difficulty.  Discussed treatment, prescriptions, labs, and imaging results with the patient.    Patient is agreeable to transfer & admission at [facility].    Discussed patient's presentation, past medical history, physical exam, labs, radiology results, vital signs, and ED course.  Consultation with DR Marroquin admission at [facility].  Pt accepted by Dr Ambriz.        Scribe Attestation:   Scribe #1: I performed the above scribed service and the documentation accurately describes the services I performed. I attest to the accuracy of the note.                  I, Dr. Teresa Swanson, personally performed the services described in this documentation. This document was produced by a scribe under my direction and in my presence. All medical record entries made by the scribe were at my direction and in my presence.  I have reviewed the chart and agree that the record reflects my personal performance and is accurate and complete. Teresa Swanson, .     01/28/2023 11:32 PM    Clinical Impression:   Final diagnoses:  [R07.9] Chest pain (Primary)  [R09.02] Hypoxia  [E87.6] Hypokalemia  [M79.89] Leg swelling        ED  Disposition Condition    Admit Stable                Teresa Swanson, DO  01/28/23 0298

## 2023-01-29 NOTE — ED NOTES
Notified MD of pt low sat and pt reporting feeling hotter and hotter one min then cold the next, and restlessness.    Notified about switching to nonrebreather mask.  Pt started feeling so much better right after the intervention.

## 2023-01-29 NOTE — ASSESSMENT & PLAN NOTE
- Echocardiogram (2/22/2022): EF: 35%-40%, GIDD, normal RV systolic function, mild TR   -Echocardiogram (5/18/2022): EF: 65%, indeterminate LV diastolic function, mild TR, PA pressure of 54 mmHg  -Echocardiogram (9/30/2022): EF: 60%, mildly reduced RV systolic function, PA pressure of 40 mmHg.   -Elevated BNP, below baseline   -Doubt exacerbation, however CT showed evidence of edema   -Lasix 40 mg IV once followed by home lasix.

## 2023-01-29 NOTE — ED NOTES
Pt gives permission to call and update wife about any changes to his care.  Pt wife wants a call with update.  Going home now.

## 2023-01-29 NOTE — ED NOTES
Pt denies any difficulty breathing or discomfort at this time. Pt reports pt has stage 4 lung cancer, pt has been coughing and had not had bad chest pain like this before.

## 2023-01-29 NOTE — PLAN OF CARE
[facility] [place] - Telemetry  Initial Discharge Assessment    Independent at home with spouse, who assists if needed; no assist needed; no current medical services or equipment used or needed; no needs reported or anticipated at this time.    Primary Care Provider: Eduardo Ruiz MD    Admission Diagnosis: Hypokalemia [E87.6]  Leg swelling [M79.89]  Hypoxia [R09.02]  Chest pain [R07.9]    Admission Date: 1/28/2023  Expected Discharge Date:     Discharge Barriers Identified: None    Payor: MEDICARE / Plan: MEDICARE PART A & B / Product Type: Government /     Extended Emergency Contact Information  Primary Emergency Contact: Natty Iverson  Mobile Phone: [phone]  Relation: Spouse  Preferred language: English   needed? No  Secondary Emergency Contact: Rolando Iverson  Mobile Phone: [phone]  Relation: Brother  Preferred language: English   needed? No    Discharge Plan A: Home with family (Follow-up)  Discharge Plan B: Other (TBD)      [facility] [id number] [place] [address] AT [address] [address]  Phone: [phone] Fax: [phone]    [facility] [address]  [address]   [facility] [address]  Phone: [phone] Fax: [phone]      Initial Assessment (most recent)       Adult Discharge Assessment - 01/29/23 0949          Discharge Assessment    Assessment Type Discharge Planning Assessment     Confirmed/corrected address, phone number and insurance Yes     Confirmed Demographics Correct on Facesheet     Source of Information patient;health record     Communicated [place] with patient/caregiver Date not available/Unable to determine     Reason For Admission Hypokalemia     People in Home spouse     Facility Arrived From: Home     Do you expect to return to your current living situation? Yes     Do you have help at home or someone to help you manage your care at home? Yes     Who are your caregiver(s) and their  phone number(s)? Natty-spouse: [phone]; Rolando-brother: [phone]     Prior to hospitilization cognitive status: Alert/Oriented     Current cognitive status: Alert/Oriented     Home Accessibility wheelchair accessible     Home Layout Able to live on 1st floor     Equipment Currently Used at Home none     Readmission within 30 days? No     Patient currently being followed by outpatient case management? No     Do you currently have service(s) that help you manage your care at home? No     Do you take prescription medications? Yes     Do you have prescription coverage? Yes     Coverage Medicare     Do you have any problems affording any of your prescribed medications? No     Is the patient taking medications as prescribed? yes     Who is going to help you get home at discharge? Natty-spouse     How do you get to doctors appointments? family or friend will provide     Are you on dialysis? No     Do you take coumadin? No     Discharge Plan A Home with family   Follow-up    Discharge Plan B Other   TBD    DME Needed Upon Discharge  other (see comments)   TBD    Discharge Plan discussed with: Patient     Discharge Barriers Identified None                 SW Role explained to patient; two patient identifiers recognized; SW contact information placed on Communication board. Discussed patient managing health care at home and discussed discharge plans A and B; determined who would be helping patient at home with recovery: Natty, spouse, will help with recovery at home.

## 2023-01-29 NOTE — ASSESSMENT & PLAN NOTE
- History of COPD on supplemental O2, 2L NC at home   - Presented with worsening SOB   - Likely in exacerbation      Plan:   - Continue steroids.  - Duo-Nebs scheduled  - Doxycyline

## 2023-01-29 NOTE — SUBJECTIVE & OBJECTIVE
Past Medical History:   Diagnosis Date    Chronic obstructive pulmonary disease with acute exacerbation 9/5/2022    Combined systolic and diastolic heart failure     Dependence on supplemental oxygen 5/24/2022    History of completed stroke 9/3/2019     09/03/2019 On CT exam    History of non-ST elevation myocardial infarction (NSTEMI) 2/22/2022    Hypertension     Lung cancer     NSCLC    Lung mass     left lung    Macrocytic anemia 8/24/2019    PAD (peripheral artery disease) 3/14/2022    LUIS FELIPE 3/11/22    Peripheral artery disease        Past Surgical History:   Procedure Laterality Date    BRONCHOSCOPY N/A 08/30/2019    Procedure: Bronchoscopy;  Surgeon: [facility] Diagnostic Provider;  Location: [facility] OR [address] [place] [address];  Service: Anesthesiology;  Laterality: N/A;    CORONARY ANGIOGRAPHY N/A 03/04/2022    Procedure: ANGIOGRAM, CORONARY ARTERY;  Surgeon: Robson Green MD;  Location: [facility] CATH LAB;  Service: Cardiology;  Laterality: N/A;    INSERTION OF TUNNELED CENTRAL VENOUS CATHETER (CVC) WITH SUBCUTANEOUS PORT         Review of patient's allergies indicates:  No Known Allergies    Current Facility-Administered Medications on File Prior to Encounter   Medication    heparin, porcine (PF) 100 unit/mL injection flush 500 Units    sodium chloride 0.9% flush 10 mL     Current Outpatient Medications on File Prior to Encounter   Medication Sig    albuterol (VENTOLIN HFA) 90 mcg/actuation inhaler Inhale 2 puffs into the lungs every 6 (six) hours as needed for Wheezing. Rescue    albuterol-ipratropium (DUO-NEB) 2.5 mg-0.5 mg/3 mL nebulizer solution Inhale contents of 1 vial (3 mLs) by nebulization every 4 (four) hours as needed for Wheezing or Shortness of Breath. Rescue    amLODIPine (NORVASC) 10 MG tablet Take 1 tablet (10 mg total) by mouth once daily.    apixaban (ELIQUIS) 5 mg Tab Take 1 tablet (5 mg total) by mouth 2 (two) times daily.    ascorbic acid-multivit-min (VITAMIN C ENERGY BOOSTER) 1,000 mg PwEP Take 3,000 mg by mouth  once daily. Patient takes 3,000 mg per day    aspirin 81 MG Chew Take 1 tablet (81 mg total) by mouth once daily.    atorvastatin (LIPITOR) 80 MG tablet Take 1 tablet (80 mg total) by mouth once daily.    cilostazoL (PLETAL) 100 MG Tab Take 1 tablet (100 mg total) by mouth 2 (two) times daily.    clopidogreL (PLAVIX) 75 mg tablet Take 1 tablet (75 mg total) by mouth once daily.    fluticasone-salmeterol diskus inhaler 100-50 mcg Inhale 1 puff into the lungs 2 (two) times daily. Controller    furosemide (LASIX) 40 MG tablet Take 1 tablet (40 mg total) by mouth 2 (two) times a day.    HYDROcodone-acetaminophen (NORCO) 7.5-325 mg per tablet Take 1 tablet by mouth every 4 (four) hours as needed. for pain.    metoprolol tartrate (LOPRESSOR) 50 MG tablet Take 1 tablet (50 mg total) by mouth 3 (three) times daily.    polyethylene glycol (GLYCOLAX) 17 gram/dose powder Mix 1 capful (17 grams total) with a liquid and take by mouth once daily.    tiotropium (SPIRIVA) 18 mcg inhalation capsule Inhale 1 capsule (18 mcg total) into the lungs via handihaler once daily. Controller     Family History       Problem Relation (Age of Onset)    Cancer Father, Sister    Diabetes Mother    Heart defect Mother    No Known Problems Son          Tobacco Use    Smoking status: Former     Packs/day: 1.00     Years: 25.00     Pack years: 25.00     Types: Cigarettes     Quit date: 2020     Years since quitting: 3.0    Smokeless tobacco: Never   Substance and Sexual Activity    Alcohol use: Yes     Comment: 11/3/22: Occ.    Drug use: Never    Sexual activity: Yes     Partners: Female     Review of Systems   HENT: Negative.     Eyes: Negative.    Respiratory:  Positive for cough and shortness of breath.    Cardiovascular:  Positive for chest pain.   Gastrointestinal: Negative.    Endocrine: Negative.    Genitourinary: Negative.    Musculoskeletal: Negative.    Skin: Negative.    Allergic/Immunologic: Negative.    Neurological: Negative.     Psychiatric/Behavioral: Negative.     Objective:     Vital Signs (Most Recent):  Temp: 98.1 °F (36.7 °C) (01/28/23 2336)  Pulse: 100 (01/28/23 2336)  Resp: 20 (01/28/23 2336)  BP: 137/70 (01/28/23 2336)  SpO2: 98 % (01/28/23 2336) Vital Signs (24h Range):  Temp:  [98 °F (36.7 °C)-98.4 °F (36.9 °C)] 98.1 °F (36.7 °C)  Pulse:  [] 100  Resp:  [18-34] 20  SpO2:  [79 %-100 %] 98 %  BP: (128-162)/() 137/70     Weight: 88.9 kg (196 lb)  Body mass index is 28.12 kg/m².    Physical Exam  Vitals and nursing note reviewed.   Constitutional:       General: He is in acute distress.      Appearance: Normal appearance. He is ill-appearing.   HENT:      Head: Normocephalic and atraumatic.      Nose: Nose normal.      Mouth/Throat:      Mouth: Mucous membranes are moist.   Eyes:      Extraocular Movements: Extraocular movements intact.   Cardiovascular:      Rate and Rhythm: Normal rate.      Pulses: Normal pulses.   Pulmonary:      Effort: Respiratory distress present.      Comments: Diminished breath sounds   Abdominal:      General: Abdomen is flat.      Palpations: Abdomen is soft.      Tenderness: There is no abdominal tenderness.   Musculoskeletal:      Right lower leg: No edema.      Left lower leg: No edema.   Skin:     General: Skin is warm.      Capillary Refill: Capillary refill takes less than 2 seconds.   Neurological:      General: No focal deficit present.      Mental Status: He is alert.   Psychiatric:         Mood and Affect: Mood normal.           Significant Labs: All pertinent labs within the past 24 hours have been reviewed.    Significant Imaging: I have reviewed all pertinent imaging results/findings within the past 24 hours.

## 2023-01-29 NOTE — ASSESSMENT & PLAN NOTE
Patient with Hypoxic Respiratory failure which is Acute on chronic.  he is on home oxygen at 2 LPM. Supplemental oxygen was provided and noted- Oxygen Concentration (%):  [50] 50.   Signs/symptoms of respiratory failure include- increased work of breathing and respiratory distress. Contributing diagnoses includes - CHF and COPD Labs and images were reviewed. Patient Has recent ABG, which has been reviewed. Will treat underlying causes and adjust management of respiratory failure as follows- See plan for COPD

## 2023-01-29 NOTE — CARE UPDATE
Patient seen and examined.  See H/P, treatment and plan per Dr. Marroquin.  63 y/o male well known to me from previous admits with lung cancer and COPD presents with chest pain.  Also with worsening dyspnea and increasing oxygen requirements at home.  Acute on chronic respiratory failure.  Probably multifactorial from cancer, COPD, pleural effusions.  Tachycardic this morning.  Was also tachycardic on last admit.  Continue home B blocker.  Wean oxygen as possible.  Patient known to Palliative Care.  Continue current management.

## 2023-01-29 NOTE — HPI
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     Patient reports having worsening shortness of breath, and reports that it was related to the change in weather. He reports having a chronic cough with productive sputum. He denied having chest pain, although in the ER notes that was his presenting complaint. He also had worsening hypoxia at home. The patient is a poor historian. In the ED, he was tachypnic, tachycardic and hypoxic requiring a NRB. Labs showed hypokalemia (3.1), no leukocytosis (4.8), mildly elevated BNP (108), negative troponin (0.013). CXR showed stable examination. CTA showed no PE, stable chronic lung changes, bilateral pleural effusions and pulmonary edema. Lower extremity dopplers were ordered. He was given a Duo-Neb and was admitted for further management.

## 2023-01-29 NOTE — H&P
[facility] Emergency [facility] Medicine  History & Physical    Patient Name: Kashif Iverson  MRN: [id number]  Patient Class: IP- Inpatient  Admission Date: 1/28/2023  Attending Physician: Carroll Ambriz MD   Primary Care Provider: Eduardo Ruiz MD         Patient information was obtained from patient and ER records.     Subjective:     Principal Problem:Acute on chronic respiratory failure with hypoxia    Chief Complaint:   Chief Complaint   Patient presents with    Chest Pain     Pt reports intermittent left sided chest pain x 1 hour. Reports being on 2L NC at home, states having to bump to 4L nc. Denies SOB. Hx stage 4 lung cancer.         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     Patient reports having worsening shortness of breath, and reports that it was related to the change in weather. He reports having a chronic cough with productive sputum. He denied having chest pain, although in the ER notes that was his presenting complaint. He also had worsening hypoxia at home. The patient is a poor historian. In the ED, he was tachypnic, tachycardic and hypoxic requiring a NRB. Labs showed hypokalemia (3.1), no leukocytosis (4.8), mildly elevated BNP (108), negative troponin (0.013). CXR showed stable examination. CTA showed no PE, stable chronic lung changes, bilateral pleural effusions and pulmonary edema. Lower extremity dopplers were ordered. He was given a Duo-Neb and was admitted for further management.       Past Medical History:   Diagnosis Date    Chronic obstructive pulmonary disease with acute exacerbation 9/5/2022    Combined systolic and diastolic heart failure     Dependence on supplemental oxygen 5/24/2022    History of completed stroke 9/3/2019      09/03/2019 On CT exam    History of non-ST elevation myocardial infarction (NSTEMI) 2/22/2022    Hypertension     Lung cancer     NSCLC    Lung mass     left lung    Macrocytic anemia 8/24/2019    PAD (peripheral artery disease) 3/14/2022    LUIS FELIPE 3/11/22    Peripheral artery disease        Past Surgical History:   Procedure Laterality Date    BRONCHOSCOPY N/A 08/30/2019    Procedure: Bronchoscopy;  Sur[facility] Diagnostic Provider;  Location: [facility] OR [address] [place] [address];  Service: Anesthesiology;  Laterality: N/A;    CORONARY ANGIOGRAPHY N/A 03/04/2022    Procedure: ANGIOGRAM, CORONARY ARTERY;  Surgeon: Robson Green MD;  Location: [facility] CATH LAB;  Service: Cardiology;  Laterality: N/A;    INSERTION OF TUNNELED CENTRAL VENOUS CATHETER (CVC) WITH SUBCUTANEOUS PORT         Review of patient's allergies indicates:  No Known Allergies    Current Facility-Administered Medications on File Prior to Encounter   Medication    heparin, porcine (PF) 100 unit/mL injection flush 500 Units    sodium chloride 0.9% flush 10 mL     Current Outpatient Medications on File Prior to Encounter   Medication Sig    albuterol (VENTOLIN HFA) 90 mcg/actuation inhaler Inhale 2 puffs into the lungs every 6 (six) hours as needed for Wheezing. Rescue    albuterol-ipratropium (DUO-NEB) 2.5 mg-0.5 mg/3 mL nebulizer solution Inhale contents of 1 vial (3 mLs) by nebulization every 4 (four) hours as needed for Wheezing or Shortness of Breath. Rescue    amLODIPine (NORVASC) 10 MG tablet Take 1 tablet (10 mg total) by mouth once daily.    apixaban (ELIQUIS) 5 mg Tab Take 1 tablet (5 mg total) by mouth 2 (two) times daily.    ascorbic acid-multivit-min (VITAMIN C ENERGY BOOSTER) 1,000 mg PwEP Take 3,000 mg by mouth once daily. Patient takes 3,000 mg per day    aspirin 81 MG Chew Take 1 tablet (81 mg total) by mouth once daily.    atorvastatin (LIPITOR) 80 MG tablet Take 1 tablet (80 mg total) by mouth once daily.    cilostazoL (PLETAL) 100 MG  Tab Take 1 tablet (100 mg total) by mouth 2 (two) times daily.    clopidogreL (PLAVIX) 75 mg tablet Take 1 tablet (75 mg total) by mouth once daily.    fluticasone-salmeterol diskus inhaler 100-50 mcg Inhale 1 puff into the lungs 2 (two) times daily. Controller    furosemide (LASIX) 40 MG tablet Take 1 tablet (40 mg total) by mouth 2 (two) times a day.    HYDROcodone-acetaminophen (NORCO) 7.5-325 mg per tablet Take 1 tablet by mouth every 4 (four) hours as needed. for pain.    metoprolol tartrate (LOPRESSOR) 50 MG tablet Take 1 tablet (50 mg total) by mouth 3 (three) times daily.    polyethylene glycol (GLYCOLAX) 17 gram/dose powder Mix 1 capful (17 grams total) with a liquid and take by mouth once daily.    tiotropium (SPIRIVA) 18 mcg inhalation capsule Inhale 1 capsule (18 mcg total) into the lungs via handihaler once daily. Controller     Family History       Problem Relation (Age of Onset)    Cancer Father, Sister    Diabetes Mother    Heart defect Mother    No Known Problems Son          Tobacco Use    Smoking status: Former     Packs/day: 1.00     Years: 25.00     Pack years: 25.00     Types: Cigarettes     Quit date: 2020     Years since quitting: 3.0    Smokeless tobacco: Never   Substance and Sexual Activity    Alcohol use: Yes     Comment: 11/3/22: Occ.    Drug use: Never    Sexual activity: Yes     Partners: Female     Review of Systems   HENT: Negative.     Eyes: Negative.    Respiratory:  Positive for cough and shortness of breath.    Cardiovascular:  Positive for chest pain.   Gastrointestinal: Negative.    Endocrine: Negative.    Genitourinary: Negative.    Musculoskeletal: Negative.    Skin: Negative.    Allergic/Immunologic: Negative.    Neurological: Negative.    Psychiatric/Behavioral: Negative.     Objective:     Vital Signs (Most Recent):  Temp: 98.1 °F (36.7 °C) (01/28/23 2336)  Pulse: 100 (01/28/23 2336)  Resp: 20 (01/28/23 2336)  BP: 137/70 (01/28/23 2336)  SpO2: 98 % (01/28/23  2336) Vital Signs (24h Range):  Temp:  [98 °F (36.7 °C)-98.4 °F (36.9 °C)] 98.1 °F (36.7 °C)  Pulse:  [] 100  Resp:  [18-34] 20  SpO2:  [79 %-100 %] 98 %  BP: (128-162)/() 137/70     Weight: 88.9 kg (196 lb)  Body mass index is 28.12 kg/m².    Physical Exam  Vitals and nursing note reviewed.   Constitutional:       General: He is in acute distress.      Appearance: Normal appearance. He is ill-appearing.   HENT:      Head: Normocephalic and atraumatic.      Nose: Nose normal.      Mouth/Throat:      Mouth: Mucous membranes are moist.   Eyes:      Extraocular Movements: Extraocular movements intact.   Cardiovascular:      Rate and Rhythm: Normal rate.      Pulses: Normal pulses.   Pulmonary:      Effort: Respiratory distress present.      Comments: Diminished breath sounds   Abdominal:      General: Abdomen is flat.      Palpations: Abdomen is soft.      Tenderness: There is no abdominal tenderness.   Musculoskeletal:      Right lower leg: No edema.      Left lower leg: No edema.   Skin:     General: Skin is warm.      Capillary Refill: Capillary refill takes less than 2 seconds.   Neurological:      General: No focal deficit present.      Mental Status: He is alert.   Psychiatric:         Mood and Affect: Mood normal.           Significant Labs: All pertinent labs within the past 24 hours have been reviewed.    Significant Imaging: I have reviewed all pertinent imaging results/findings within the past 24 hours.    Assessment/Plan:     * Acute on chronic respiratory failure with hypoxia  Patient with Hypoxic Respiratory failure which is Acute on chronic.  he is on home oxygen at 2 LPM. Supplemental oxygen was provided and noted- Oxygen Concentration (%):  [50] 50.   Signs/symptoms of respiratory failure include- increased work of breathing and respiratory distress. Contributing diagnoses includes - CHF and COPD Labs and images were reviewed. Patient Has recent ABG, which has been reviewed. Will treat  underlying causes and adjust management of respiratory failure as follows- See plan for COPD    Persistent atrial fibrillation  [id number] Score: 3. Resume home medications     Chronic obstructive pulmonary disease with acute exacerbation  - History of COPD on supplemental O2, 2L NC at home   - Presented with worsening SOB   - Likely in exacerbation      Plan:   - Continue steroids.  - Duo-Nebs scheduled  - Doxycyline     Coronary artery disease involving native coronary artery of native heart without angina pectoris  Resume aspirin, plavix, and statin    PAD (peripheral artery disease)  Continue home medications     Chronic diastolic heart failure  - Echocardiogram (2/22/2022): EF: 35%-40%, GIDD, normal RV systolic function, mild TR   -Echocardiogram (5/18/2022): EF: 65%, indeterminate LV diastolic function, mild TR, PA pressure of 54 mmHg  -Echocardiogram (9/30/2022): EF: 60%, mildly reduced RV systolic function, PA pressure of 40 mmHg.   -Elevated BNP, below baseline   -Doubt exacerbation, however CT showed evidence of edema   -Lasix 40 mg IV once followed by home lasix.     Essential hypertension  Resume home medications       Lung cancer, main bronchus, left  Outpatient follow up with palliative care/oncology          VTE Risk Mitigation (From admission, onward)         Ordered     apixaban tablet 5 mg  2 times daily         01/28/23 2125     IP VTE HIGH RISK PATIENT  Once         01/28/23 2125     Place sequential compression device  Until discontinued         01/28/23 2125                   David Marroquin MD  Department of Hospital Medicine   [facility] - Emergency Dept

## 2023-01-29 NOTE — AI DETERIORATION ALERT
Artificial Intelligence Notification      Admit Date: 2023  LOS: 1  Code Status: Full Code   Date of Consult: 2023  : 1960  Age: 62 y.o.  Weight:   Wt Readings from Last 1 Encounters:   23 79.7 kg (175 lb 11.3 oz)     Sex: male  Bed: [facility] A:   MRN: [id number]  Attending Physician: Ant Martin MD  Primary Service: Networked reference to record PCT   Time AI Alert Received: 8:05 am  Time at Bedside: 8:15 am           Patient found sitting up in bed in no distress. Denies palpitations, chest pain or worsening shortness of breath. O2 saturation 96%.   Telemetry reviewed and appears sinus tachycardia. Will allow patient to finish eating breakfast and recheck. Morning meds include metoprolol as well which he has not taken this morning.      Vital Signs (Most Recent):  Temp: 98.7 °F (37.1 °C) (23 035)  Pulse: (!) 132 (23)  Resp: (!) 32 (23)  BP: 107/60 (23 035)  SpO2: (!) 76 % (O2 found not in use. Post tx 96%) (23)   Vital Signs (24h Range):  Temp:  [98 °F (36.7 °C)-98.7 °F (37.1 °C)] 98.7 °F (37.1 °C)  Pulse:  [] 132  Resp:  [18-34] 32  SpO2:  [76 %-100 %] 76 %  BP: (107-162)/() 107/60         This encounter was triggered by an Artificial Intelligence Notification.     Artificial Intelligence alert discussed with Primary team:  Name Dr. Martin

## 2023-01-29 NOTE — PLAN OF CARE
Patient currently on Venti Mask at 15 L/gio with 50% oxygen concentration satting between 94-98%. Patient easily winded on exertion. Patient HR mainly tachycardic ranging between 100s-120s at rest and 110s-140s with activity. HR got as high as 160s when patient is moving around and takes off his oxygen. Eventually settled back into range when patient was at rest.     Problem: Adult Inpatient Plan of Care  Goal: Plan of Care Review  Outcome: Ongoing, Progressing  Goal: Patient-Specific Goal (Individualized)  Outcome: Ongoing, Progressing  Goal: Absence of Hospital-Acquired Illness or Injury  Outcome: Ongoing, Progressing  Goal: Optimal Comfort and Wellbeing  Outcome: Ongoing, Progressing  Goal: Readiness for Transition of Care  Outcome: Ongoing, Progressing     Problem: Fluid Imbalance (Pneumonia)  Goal: Fluid Balance  Outcome: Ongoing, Progressing     Problem: Infection (Pneumonia)  Goal: Resolution of Infection Signs and Symptoms  Outcome: Ongoing, Progressing     Problem: Respiratory Compromise (Pneumonia)  Goal: Effective Oxygenation and Ventilation  Outcome: Ongoing, Progressing  Intervention: Optimize Oxygenation and Ventilation  Flowsheets (Taken 1/29/2023 7003)  Airway/Ventilation Management:   airway patency maintained   calming measures promoted  Head of Bed (HOB) Positioning: HOB at 45 degrees     Problem: Infection  Goal: Absence of Infection Signs and Symptoms  Outcome: Ongoing, Progressing

## 2023-01-29 NOTE — PLAN OF CARE
Problem: Breathing Pattern Ineffective  Goal: Effective Breathing Pattern  Outcome: Ongoing, Progressing  Intervention: Promote Improved Breathing Pattern  Flowsheets (Taken 1/29/2023 9062)  Airway/Ventilation Management:   airway patency maintained   calming measures promoted   humidification applied  Breathing Techniques/Airway Clearance:   pursed-lip breathing encouraged   deep/controlled cough encouraged  Supportive Measures:   active listening utilized   goal-setting facilitated   relaxation techniques promoted  Head of Bed (HOB) Positioning: HOB elevated

## 2023-01-30 NOTE — ASSESSMENT & PLAN NOTE
- Echocardiogram (2/22/2022): EF: 35%-40%, GIDD, normal RV systolic function, mild TR   -Echocardiogram (5/18/2022): EF: 65%, indeterminate LV diastolic function, mild TR, PA pressure of 54 mmHg  -Echocardiogram (9/30/2022): EF: 60%, mildly reduced RV systolic function, PA pressure of 40 mmHg.   -Elevated BNP, below baseline   No exacerbation.  Continue home lasix.

## 2023-01-30 NOTE — HPI
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      Patient reports having worsening shortness of breath, and reports that it was related to the change in weather. He reports having a chronic cough with productive sputum. He denied having chest pain, although in the ER notes that was his presenting complaint. He also had worsening hypoxia at home. The patient is a poor historian. In the ED, he was tachypnic, tachycardic and hypoxic requiring a NRB. Labs showed hypokalemia (3.1), no leukocytosis (4.8), mildly elevated BNP (108), negative troponin (0.013). CXR showed stable examination. CTA showed no PE, stable chronic lung changes, bilateral pleural effusions and pulmonary edema. Lower extremity dopplers were ordered. He was given a Duo-Neb and was admitted for further management.

## 2023-01-30 NOTE — PROGRESS NOTES
Outpatient Care Management  Patient Not Qualified    Patient: Kashif Iverson  MRN:  [id number]  Date of Service:  1/30/2023  Completed by:  Yumiko Rizo, RN    Chief Complaint   Patient presents with    OPCM RN First Assessment Attempt    OPCM Enrollment Call    OPCM Chart Review    OPCM RN Second Assessment Attempt    OPCM RN Third Assessment Attempt       Patient Summary     Program:  OPCM High Risk     Reason Not Qualified:  Currently inpatient

## 2023-01-30 NOTE — PLAN OF CARE
O2 sats maintaining in the mid to upper 90s on 6 L/min high flow nasal cannula. HR in the 90s-100s range throughout most the night. Patient comfortable.    Problem: Adult Inpatient Plan of Care  Goal: Plan of Care Review  Outcome: Ongoing, Progressing  Goal: Patient-Specific Goal (Individualized)  Outcome: Ongoing, Progressing  Goal: Absence of Hospital-Acquired Illness or Injury  Outcome: Ongoing, Progressing  Goal: Optimal Comfort and Wellbeing  Outcome: Ongoing, Progressing  Goal: Readiness for Transition of Care  Outcome: Ongoing, Progressing     Problem: Fluid Imbalance (Pneumonia)  Goal: Fluid Balance  Outcome: Ongoing, Progressing     Problem: Infection (Pneumonia)  Goal: Resolution of Infection Signs and Symptoms  Outcome: Ongoing, Progressing     Problem: Respiratory Compromise (Pneumonia)  Goal: Effective Oxygenation and Ventilation  Outcome: Ongoing, Progressing     Problem: Infection  Goal: Absence of Infection Signs and Symptoms  Outcome: Ongoing, Progressing     Problem: Coping Ineffective  Goal: Effective Coping  Outcome: Ongoing, Progressing     Problem: Breathing Pattern Ineffective  Goal: Effective Breathing Pattern  Outcome: Ongoing, Progressing

## 2023-01-30 NOTE — CONSULTS
[facility] - Telemetry  Palliative Medicine  Consult Note    Patient Name: Kashif Iverson  MRN: [id number]  Admission Date: 1/28/2023  Hospital Length of Stay: 2 days  Code Status: Full Code   Attending Provider: Ant Martin MD  Consulting Provider: Eli Arango NP  Primary Care Physician: Eduardo Ruiz MD  Principal Problem:Acute on chronic respiratory failure with hypoxia    Patient information was obtained from patient, past medical records, ER records and primary team.      Inpatient consult to Palliative Care  Consult performed by: Eli Arango NP  Consult ordered by: Ant Martin MD  Reason for consult: [facility]        Assessment/Plan:   Palliative encounter     -Palliative consult received  -chart reviewed in detail  Patient very well known to me. He has COPD, CHF and lung cancer for which he continues maintenance tx and last tx earlier this month.  Per PCP he was finally  agreeable to home Palliative which was started earlier this anna with [facility]. But only seen once prior to transfer to hospital. He has continued to refuse hospice.   -Patient is having some confusion and is very tired. I will f/u with him when he is more oriented and able to participate fully in conversation.   -updated Dr Martin     Acute on chronic respiratory failure with hypoxia  Patient with Hypoxic Respiratory failure which is Acute on chronic.  he is on home oxygen at 2 LPM. Supplemental oxygen was provided and noted- Oxygen Concentration (%):  [50] 50.   Signs/symptoms of respiratory failure include- increased work of breathing and respiratory distress. Contributing diagnoses includes - CHF and COPD Labs and images were reviewed. Patient Has recent ABG, which has been reviewed. Will treat underlying causes and adjust management of respiratory failure as follows- See plan for COPD       .Chronic obstructive pulmonary disease with acute exacerbation  -exacerbation; continued frequent hospitalizations   -he meets hospice  criteria but refuses  -he is followed by Palliative in home NP as of recent    -mgmt per primary    Chronic diastolic heart failure  - Echocardiogram (2/22/2022): EF: 35%-40%, GIDD, normal RV systolic function, mild TR   -Echocardiogram (5/18/2022): EF: 65%, indeterminate LV diastolic function, mild TR, PA pressure of 54 mmHg  -Echocardiogram (9/30/2022): EF: 60%, mildly reduced RV systolic function, PA pressure of 40 mmHg.       -mgmt per primary    Coronary artery disease involving native coronary artery of native heart without angina pectoris       PAD (peripheral artery disease)    Persistent atrial fibrillation    Essential hypertension        Lung cancer, main bronchus, left  Followed Outpatient by oncology and on mainteneance tx; last being this month     Thank you for your consult. I will follow-up with patient. Please contact us if you have any additional questions.    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      Patient reports having worsening shortness of breath, and reports that it was related to the change in weather. He reports having a chronic cough with productive sputum. He denied having chest pain, although in the ER notes that was his presenting complaint. He also had worsening hypoxia at home. The patient is a poor historian. In the ED, he was tachypnic, tachycardic and hypoxic requiring a NRB. Labs showed hypokalemia (3.1), no leukocytosis (4.8), mildly elevated BNP (108), negative troponin (0.013). CXR showed stable examination. CTA showed no PE, stable chronic lung changes, bilateral pleural effusions and pulmonary edema. Lower extremity dopplers were ordered. He was given a Duo-Neb and was admitted for further management.           Hospital Course:  No notes on file        Past Medical History:   Diagnosis Date    Chronic obstructive pulmonary disease with acute exacerbation 9/5/2022    Combined systolic and diastolic heart failure     Dependence on supplemental oxygen 5/24/2022    History of completed stroke 9/3/2019     09/03/2019 On CT exam    History of non-ST elevation myocardial infarction (NSTEMI) 2/22/2022    Hypertension     Lung cancer     NSCLC    Lung mass     left lung    Macrocytic anemia 8/24/2019    PAD (peripheral artery disease) 3/14/2022    LUIS FELIPE 3/11/22    Peripheral artery disease        Past Surgical History:   Procedure Laterality Date    BRONCHOSCOPY N/A 08/30/2019    Procedure: Bronchoscopy;  Surgeon: Miguel Diagnostic Provider;  Location: [facility] OR [address] [place] [address];  Service: Anesthesiology;  Laterality: N/A;    CORONARY ANGIOGRAPHY N/A 03/04/2022    Procedure: ANGIOGRAM, CORONARY ARTERY;  Surgeon: Robson Green MD;  Location: [facility] CATH LAB;  Service: Cardiology;  Laterality: N/A;    INSERTION OF TUNNELED CENTRAL VENOUS CATHETER (CVC) WITH SUBCUTANEOUS PORT         Review of patient's allergies indicates:  No Known Allergies    Medications:  Continuous Infusions:  Scheduled Meds:   albuterol-ipratropium  3 mL Nebulization Q4H    amLODIPine  10 mg Oral Daily    apixaban  5 mg Oral BID    aspirin  81 mg Oral Daily    atorvastatin  80 mg Oral Daily    cilostazoL  100 mg Oral BID    clopidogreL  75 mg Oral Daily    doxycycline  100 mg Oral Q12H    furosemide  40 mg Oral BID    methylPREDNISolone sodium succinate injection  40 mg Intravenous Daily    metoprolol tartrate  50 mg Oral TID    polyethylene glycol  17 g Oral Daily     PRN Meds:acetaminophen, acetaminophen, albuterol-ipratropium, aluminum-magnesium hydroxide-simethicone, bisacodyL, dextrose 10%, dextrose 10%, glucagon (human recombinant), glucose, glucose, HYDROcodone-acetaminophen, magnesium oxide, magnesium oxide, melatonin, naloxone, ondansetron, potassium  bicarbonate, potassium bicarbonate, potassium bicarbonate, potassium, sodium phosphates, potassium, sodium phosphates, potassium, sodium phosphates, prochlorperazine, simethicone, sodium chloride 0.9%    Family History       Problem Relation (Age of Onset)    Cancer Father, Sister    Diabetes Mother    Heart defect Mother    No Known Problems Son          Tobacco Use    Smoking status: Former     Packs/day: 1.00     Years: 25.00     Pack years: 25.00     Types: Cigarettes     Quit date: 2020     Years since quitting: 3.0    Smokeless tobacco: Never   Substance and Sexual Activity    Alcohol use: Yes     Comment: 11/3/22: Occ.    Drug use: Never    Sexual activity: Yes     Partners: Female       Review of Systems   Constitutional:  Positive for activity change and appetite change.   Respiratory:  Positive for cough and shortness of breath.    Cardiovascular:  Positive for chest pain.   Psychiatric/Behavioral:  Positive for confusion.    Objective:     Vital Signs (Most Recent):  Temp: 97.9 °F (36.6 °C) (01/30/23 1106)  Pulse: 88 (01/30/23 1106)  Resp: 18 (01/30/23 1106)  BP: 125/77 (01/30/23 1106)  SpO2: 98 % (01/30/23 1106) Vital Signs (24h Range):  Temp:  [97.7 °F (36.5 °C)-98.4 °F (36.9 °C)] 97.9 °F (36.6 °C)  Pulse:  [] 88  Resp:  [18-24] 18  SpO2:  [91 %-98 %] 98 %  BP: (114-132)/(58-77) 125/77     Weight: 79.7 kg (175 lb 11.3 oz)  Body mass index is 25.21 kg/m².    Physical Exam  Vitals and nursing note reviewed.   HENT:      Right Ear: External ear normal.      Left Ear: External ear normal.      Nose: Nose normal.   Eyes:      General:         Right eye: No discharge.         Left eye: No discharge.   Cardiovascular:      Rate and Rhythm: Tachycardia present.   Pulmonary:      Comments: increased work of breathing with oxygen  Abdominal:      General: Abdomen is flat.      Palpations: Abdomen is soft.   Musculoskeletal:      Right lower leg: No edema.      Left lower leg: No edema.   Skin:     General:  Skin is warm and dry.   Neurological:      Comments: Some confusion, oriented x 2        Review of Symptoms      Symptom Assessment (ESAS 0-10 Scale)  Pain:  0  Dyspnea:  0  Anxiety:  0  Nausea:  0  Depression:  0  Anorexia:  0  Fatigue:  0  Insomnia:  0  Restlessness:  0  Agitation:  0         Psychosocial/Cultural:   See Palliative Psychosocial Note: No  **Primary  to Follow**  Palliative Care  Consult: No      Advance Care Planning   Advance Directives:   Goals of Care: What is most important right now is to focus on spending time at home, avoiding the hospital, remaining as independent as possible, symptom/pain control, quality of life, even if it means sacrificing a little time. Accordingly, we have decided that the best plan to meet the patient's goals includes enrolling in hospice care.       Significant Labs: All pertinent labs within the past 24 hours have been reviewed.  CBC:   Recent Labs   Lab 01/29/23  0559   WBC 5.95   HGB 13.4*   HCT 39.1*   MCV 88        BMP:  No results for input(s): GLU, NA, K, CL, CO2, BUN, CREATININE, CALCIUM, MG in the last 24 hours.  LFT:  Lab Results   Component Value Date    AST 18 01/28/2023    ALKPHOS 109 01/28/2023    BILITOT 0.5 01/28/2023     Albumin:   Albumin   Date Value Ref Range Status   01/28/2023 3.2 (L) 3.5 - 5.2 g/dL Final     Protein:   Total Protein   Date Value Ref Range Status   01/28/2023 6.8 6.0 - 8.4 g/dL Final     Lactic acid:   Lab Results   Component Value Date    LACTATE 1.9 12/16/2022    LACTATE 2.3 (H) 12/16/2022       Significant Imaging:last imaging CXR and CTA chest 1/28      > 50% of 70 min visit spent in chart review, face to face discussion of goals of care,  symptom assessment, coordination of care and emotional support.    Eli Arango NP  Palliative Medicine  [facility]

## 2023-01-30 NOTE — SUBJECTIVE & OBJECTIVE
Interval History: feeling a little better today.    Review of Systems   HENT:  Negative for ear discharge and ear pain.    Eyes:  Negative for discharge and itching.   Endocrine: Negative for cold intolerance and heat intolerance.   Neurological:  Negative for seizures and syncope.   Objective:     Vital Signs (Most Recent):  Temp: 97.9 °F (36.6 °C) (01/30/23 1106)  Pulse: 105 (01/30/23 1246)  Resp: 18 (01/30/23 1246)  BP: 125/77 (01/30/23 1106)  SpO2: 95 % (01/30/23 1246) Vital Signs (24h Range):  Temp:  [97.7 °F (36.5 °C)-98.4 °F (36.9 °C)] 97.9 °F (36.6 °C)  Pulse:  [] 105  Resp:  [18-20] 18  SpO2:  [91 %-98 %] 95 %  BP: (114-132)/(58-77) 125/77     Weight: 79.7 kg (175 lb 11.3 oz)  Body mass index is 25.21 kg/m².    Intake/Output Summary (Last 24 hours) at 1/30/2023 1450  Last data filed at 1/30/2023 1200  Gross per 24 hour   Intake 580 ml   Output 1475 ml   Net -895 ml      Physical Exam  Vitals and nursing note reviewed.   Constitutional:       Appearance: Normal appearance. He is ill-appearing.   HENT:      Head: Normocephalic and atraumatic.      Nose: Nose normal.      Mouth/Throat:      Mouth: Mucous membranes are moist.   Eyes:      Extraocular Movements: Extraocular movements intact.   Cardiovascular:      Rate and Rhythm: Normal rate.      Pulses: Normal pulses.   Pulmonary:      Breath sounds: No rales.      Comments: Diminished breath sounds   Abdominal:      General: Abdomen is flat.      Palpations: Abdomen is soft.      Tenderness: There is no abdominal tenderness.   Musculoskeletal:      Right lower leg: No edema.      Left lower leg: No edema.   Skin:     General: Skin is warm.      Capillary Refill: Capillary refill takes less than 2 seconds.   Neurological:      General: No focal deficit present.      Mental Status: He is alert.   Psychiatric:         Mood and Affect: Mood normal.       Significant Labs: All pertinent labs within the past 24 hours have been reviewed.  Blood Culture: No  results for input(s): LABBLOO in the last 48 hours.  BMP:   Recent Labs   Lab 01/29/23  0559   *      K 3.5      CO2 22*   BUN 16   CREATININE 1.0   CALCIUM 9.8   MG 1.6       Significant Imaging: I have reviewed all pertinent imaging results/findings within the past 24 hours.

## 2023-01-30 NOTE — SUBJECTIVE & OBJECTIVE
Past Medical History:   Diagnosis Date    Chronic obstructive pulmonary disease with acute exacerbation 9/5/2022    Combined systolic and diastolic heart failure     Dependence on supplemental oxygen 5/24/2022    History of completed stroke 9/3/2019     09/03/2019 On CT exam    History of non-ST elevation myocardial infarction (NSTEMI) 2/22/2022    Hypertension     Lung cancer     NSCLC    Lung mass     left lung    Macrocytic anemia 8/24/2019    PAD (peripheral artery disease) 3/14/2022    LUIS FELIPE 3/11/22    Peripheral artery disease        Past Surgical History:   Procedure Laterality Date    BRONCHOSCOPY N/A 08/30/2019    Procedure: Bronchoscopy;  Surgeon: [facility] Diagnostic Provider;  Location: [facility] OR [address] [place] [address];  Service: Anesthesiology;  Laterality: N/A;    CORONARY ANGIOGRAPHY N/A 03/04/2022    Procedure: ANGIOGRAM, CORONARY ARTERY;  Surgeon: Robson Green MD;  Location: [facility] CATH LAB;  Service: Cardiology;  Laterality: N/A;    INSERTION OF TUNNELED CENTRAL VENOUS CATHETER (CVC) WITH SUBCUTANEOUS PORT         Review of patient's allergies indicates:  No Known Allergies    Medications:  Continuous Infusions:  Scheduled Meds:   albuterol-ipratropium  3 mL Nebulization Q4H    amLODIPine  10 mg Oral Daily    apixaban  5 mg Oral BID    aspirin  81 mg Oral Daily    atorvastatin  80 mg Oral Daily    cilostazoL  100 mg Oral BID    clopidogreL  75 mg Oral Daily    doxycycline  100 mg Oral Q12H    furosemide  40 mg Oral BID    methylPREDNISolone sodium succinate injection  40 mg Intravenous Daily    metoprolol tartrate  50 mg Oral TID    polyethylene glycol  17 g Oral Daily     PRN Meds:acetaminophen, acetaminophen, albuterol-ipratropium, aluminum-magnesium hydroxide-simethicone, bisacodyL, dextrose 10%, dextrose 10%, glucagon (human recombinant), glucose, glucose, HYDROcodone-acetaminophen, magnesium oxide, magnesium oxide, melatonin, naloxone, ondansetron, potassium bicarbonate, potassium bicarbonate, potassium  bicarbonate, potassium, sodium phosphates, potassium, sodium phosphates, potassium, sodium phosphates, prochlorperazine, simethicone, sodium chloride 0.9%    Family History       Problem Relation (Age of Onset)    Cancer Father, Sister    Diabetes Mother    Heart defect Mother    No Known Problems Son          Tobacco Use    Smoking status: Former     Packs/day: 1.00     Years: 25.00     Pack years: 25.00     Types: Cigarettes     Quit date: 2020     Years since quitting: 3.0    Smokeless tobacco: Never   Substance and Sexual Activity    Alcohol use: Yes     Comment: 11/3/22: Occ.    Drug use: Never    Sexual activity: Yes     Partners: Female       Review of Systems   Constitutional:  Positive for activity change and appetite change.   Respiratory:  Positive for cough and shortness of breath.    Cardiovascular:  Positive for chest pain.   Psychiatric/Behavioral:  Positive for confusion.    Objective:     Vital Signs (Most Recent):  Temp: 97.9 °F (36.6 °C) (01/30/23 1106)  Pulse: 88 (01/30/23 1106)  Resp: 18 (01/30/23 1106)  BP: 125/77 (01/30/23 1106)  SpO2: 98 % (01/30/23 1106) Vital Signs (24h Range):  Temp:  [97.7 °F (36.5 °C)-98.4 °F (36.9 °C)] 97.9 °F (36.6 °C)  Pulse:  [] 88  Resp:  [18-24] 18  SpO2:  [91 %-98 %] 98 %  BP: (114-132)/(58-77) 125/77     Weight: 79.7 kg (175 lb 11.3 oz)  Body mass index is 25.21 kg/m².    Physical Exam  Vitals and nursing note reviewed.   HENT:      Right Ear: External ear normal.      Left Ear: External ear normal.      Nose: Nose normal.   Eyes:      General:         Right eye: No discharge.         Left eye: No discharge.   Cardiovascular:      Rate and Rhythm: Tachycardia present.   Pulmonary:      Comments: increased work of breathing with oxygen  Abdominal:      General: Abdomen is flat.      Palpations: Abdomen is soft.   Musculoskeletal:      Right lower leg: No edema.      Left lower leg: No edema.   Skin:     General: Skin is warm and dry.   Neurological:       Comments: Some confusion, oriented x 2        Review of Symptoms      Symptom Assessment (ESAS 0-10 Scale)  Pain:  0  Dyspnea:  0  Anxiety:  0  Nausea:  0  Depression:  0  Anorexia:  0  Fatigue:  0  Insomnia:  0  Restlessness:  0  Agitation:  0         Psychosocial/Cultural:   See Palliative Psychosocial Note: No  **Primary  to Follow**  Palliative Care  Consult: No      Advance Care Planning   Advance Directives:   Goals of Care: What is most important right now is to focus on spending time at home, avoiding the hospital, remaining as independent as possible, symptom/pain control, quality of life, even if it means sacrificing a little time. Accordingly, we have decided that the best plan to meet the patient's goals includes enrolling in hospice care.       Significant Labs: All pertinent labs within the past 24 hours have been reviewed.  CBC:   Recent Labs   Lab 01/29/23  0559   WBC 5.95   HGB 13.4*   HCT 39.1*   MCV 88        BMP:  No results for input(s): GLU, NA, K, CL, CO2, BUN, CREATININE, CALCIUM, MG in the last 24 hours.  LFT:  Lab Results   Component Value Date    AST 18 01/28/2023    ALKPHOS 109 01/28/2023    BILITOT 0.5 01/28/2023     Albumin:   Albumin   Date Value Ref Range Status   01/28/2023 3.2 (L) 3.5 - 5.2 g/dL Final     Protein:   Total Protein   Date Value Ref Range Status   01/28/2023 6.8 6.0 - 8.4 g/dL Final     Lactic acid:   Lab Results   Component Value Date    LACTATE 1.9 12/16/2022    LACTATE 2.3 (H) 12/16/2022       Significant Imaging:last imaging CXR and CTA chest 1/28

## 2023-01-30 NOTE — PROGRESS NOTES
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     Patient reports having worsening shortness of breath, and reports that it was related to the change in weather. He reports having a chronic cough with productive sputum. He denied having chest pain, although in the ER notes that was his presenting complaint. He also had worsening hypoxia at home. The patient is a poor historian. In the ED, he was tachypnic, tachycardic and hypoxic requiring a NRB. Labs showed hypokalemia (3.1), no leukocytosis (4.8), mildly elevated BNP (108), negative troponin (0.013). CXR showed stable examination. CTA showed no PE, stable chronic lung changes, bilateral pleural effusions and pulmonary edema. Lower extremity dopplers were ordered. He was given a Duo-Neb and was admitted for further management.       Overview/Hospital Course:  63 y/o male well known to me from previous admits with lung cancer and COPD presents with chest pain.  Also with worsening dyspnea and increasing oxygen requirements at home.  Acute on chronic respiratory failure.  Probably multifactorial from cancer, COPD, pleural effusions.  Started on nebs, steroids and oxygen.      Interval History: feeling a little better today.    Review of Systems   HENT:  Negative for ear discharge and ear pain.    Eyes:  Negative for discharge and itching.   Endocrine: Negative for cold  intolerance and heat intolerance.   Neurological:  Negative for seizures and syncope.   Objective:     Vital Signs (Most Recent):  Temp: 97.9 °F (36.6 °C) (01/30/23 1106)  Pulse: 105 (01/30/23 1246)  Resp: 18 (01/30/23 1246)  BP: 125/77 (01/30/23 1106)  SpO2: 95 % (01/30/23 1246) Vital Signs (24h Range):  Temp:  [97.7 °F (36.5 °C)-98.4 °F (36.9 °C)] 97.9 °F (36.6 °C)  Pulse:  [] 105  Resp:  [18-20] 18  SpO2:  [91 %-98 %] 95 %  BP: (114-132)/(58-77) 125/77     Weight: 79.7 kg (175 lb 11.3 oz)  Body mass index is 25.21 kg/m².    Intake/Output Summary (Last 24 hours) at 1/30/2023 1450  Last data filed at 1/30/2023 1200  Gross per 24 hour   Intake 580 ml   Output 1475 ml   Net -895 ml      Physical Exam  Vitals and nursing note reviewed.   Constitutional:       Appearance: Normal appearance. He is ill-appearing.   HENT:      Head: Normocephalic and atraumatic.      Nose: Nose normal.      Mouth/Throat:      Mouth: Mucous membranes are moist.   Eyes:      Extraocular Movements: Extraocular movements intact.   Cardiovascular:      Rate and Rhythm: Normal rate.      Pulses: Normal pulses.   Pulmonary:      Breath sounds: No rales.      Comments: Diminished breath sounds   Abdominal:      General: Abdomen is flat.      Palpations: Abdomen is soft.      Tenderness: There is no abdominal tenderness.   Musculoskeletal:      Right lower leg: No edema.      Left lower leg: No edema.   Skin:     General: Skin is warm.      Capillary Refill: Capillary refill takes less than 2 seconds.   Neurological:      General: No focal deficit present.      Mental Status: He is alert.   Psychiatric:         Mood and Affect: Mood normal.       Significant Labs: All pertinent labs within the past 24 hours have been reviewed.  Blood Culture: No results for input(s): LABBLOO in the last 48 hours.  BMP:   Recent Labs   Lab 01/29/23  0559   *      K 3.5      CO2 22*   BUN 16   CREATININE 1.0   CALCIUM 9.8   MG 1.6        Significant Imaging: I have reviewed all pertinent imaging results/findings within the past 24 hours.      Assessment/Plan:      * Acute on chronic respiratory failure with hypoxia  Patient with Hypoxic Respiratory failure which is Acute on chronic.  he is on home oxygen at 2 LPM. Supplemental oxygen was provided and noted-  .   Signs/symptoms of respiratory failure include- increased work of breathing and respiratory distress. Contributing diagnoses includes - CHF and COPD Labs and images were reviewed. Patient Has recent ABG, which has been reviewed. Will treat underlying causes and adjust management of respiratory failure.  Probably multifactorial secondary to COPD, lung cancer and pleural effusions.  Continue nebs, oxygen and steroids.\  Wean oxygen as possible down to home O2.    Persistent atrial fibrillation  .[id number] Score: 3.   Continue B blocker and AFib.    Chronic obstructive pulmonary disease with acute exacerbation  - History of COPD on supplemental O2  - Presented with worsening SOB   - Likely in exacerbation    Plan:   - Continue steroids.  - Duo-Nebs scheduled  - Doxycyline     Coronary artery disease involving native coronary artery of native heart without angina pectoris  Resume aspirin, plavix, and statin    PAD (peripheral artery disease)  Continue home medications     Chronic diastolic heart failure  - Echocardiogram (2/22/2022): EF: 35%-40%, GIDD, normal RV systolic function, mild TR   -Echocardiogram (5/18/2022): EF: 65%, indeterminate LV diastolic function, mild TR, PA pressure of 54 mmHg  -Echocardiogram (9/30/2022): EF: 60%, mildly reduced RV systolic function, PA pressure of 40 mmHg.   -Elevated BNP, below baseline   No exacerbation.  Continue home lasix.    Essential hypertension  Continue current management.      Lung cancer, main bronchus, left  Continue treatment and follow up with Oncology.         VTE Risk Mitigation (From admission, onward)         Ordered     apixaban  tablet 5 mg  2 times daily         01/28/23 2125     IP VTE HIGH RISK PATIENT  Once         01/28/23 2125     Place sequential compression device  Until discontinued         01/28/23 2125                Discharge Planning   AZ:      Code Status: Full Code   Is the patient medically ready for discharge?:     Reason for patient still in hospital (select all that apply): Treatment  Discharge Plan A: Home with family (Follow-up)                  Ant Martin MD  Department of [facility]   [facility]

## 2023-01-30 NOTE — ASSESSMENT & PLAN NOTE
- History of COPD on supplemental O2  - Presented with worsening SOB   - Likely in exacerbation    Plan:   - Continue steroids.  - Duo-Nebs scheduled  - Doxycyline

## 2023-01-30 NOTE — ASSESSMENT & PLAN NOTE
Patient with Hypoxic Respiratory failure which is Acute on chronic.  he is on home oxygen at 2 LPM. Supplemental oxygen was provided and noted-  .   Signs/symptoms of respiratory failure include- increased work of breathing and respiratory distress. Contributing diagnoses includes - CHF and COPD Labs and images were reviewed. Patient Has recent ABG, which has been reviewed. Will treat underlying causes and adjust management of respiratory failure.  Probably multifactorial secondary to COPD, lung cancer and pleural effusions.  Continue nebs, oxygen and steroids.\  Wean oxygen as possible down to home O2.

## 2023-01-31 NOTE — PLAN OF CARE
Problem: Respiratory Compromise (Pneumonia)  Goal: Effective Oxygenation and Ventilation  Outcome: Ongoing, Progressing     Problem: Coping Ineffective  Goal: Effective Coping  1/31/2023 1642 by Carl Sharp RN  Outcome: Ongoing, Progressing  1/31/2023 1642 by Carl Sharp RN  Outcome: Ongoing, Progressing     Problem: Breathing Pattern Ineffective  Goal: Effective Breathing Pattern  1/31/2023 1642 by Carl Sharp RN  Outcome: Ongoing, Progressing  1/31/2023 1642 by Carl Sharp RN  Outcome: Ongoing, Progressing     Problem: Skin Injury Risk Increased  Goal: Skin Health and Integrity  1/31/2023 1642 by Carl Sharp RN  Outcome: Ongoing, Progressing  1/31/2023 1642 by Carl Sharp RN  Outcome: Ongoing, Progressing     Problem: Fall Injury Risk  Goal: Absence of Fall and Fall-Related Injury  1/31/2023 1642 by Carl Sharp RN  Outcome: Ongoing, Progressing  1/31/2023 1642 by Carl Sharp RN  Outcome: Ongoing, Progressing     Problem: Thought Process Alteration  Goal: Optimal Thought Clarity  Outcome: Ongoing, Progressing   Attempted to wean pt down to baseline oxygen requirement as MD ordered. Pt able to tolerate being weaned down to 3L nc at this time in order to keep appropriate sats. No acute events to report.

## 2023-01-31 NOTE — NURSING
End of shift report given to night nurse Suzie. Patient in no acute distress. Patient sitting on side of the bed. 5L NC. Bed in lowest position, call light within reach, side rails up x2.     12 hour charting complete.

## 2023-01-31 NOTE — SUBJECTIVE & OBJECTIVE
Interval History: no new complaints.    Review of Systems   HENT:  Negative for ear discharge and ear pain.    Eyes:  Negative for discharge and itching.   Endocrine: Negative for cold intolerance and heat intolerance.   Neurological:  Negative for seizures and syncope.   Objective:     Vital Signs (Most Recent):  Temp: 97.8 °F (36.6 °C) (01/31/23 0834)  Pulse: 88 (01/31/23 1200)  Resp: 20 (01/31/23 1200)  BP: 128/74 (01/31/23 0834)  SpO2: 97 % (01/31/23 1200) Vital Signs (24h Range):  Temp:  [97.6 °F (36.4 °C)-98.5 °F (36.9 °C)] 97.8 °F (36.6 °C)  Pulse:  [] 88  Resp:  [17-20] 20  SpO2:  [83 %-100 %] 97 %  BP: (111-128)/(61-80) 128/74     Weight: 79.7 kg (175 lb 11.3 oz)  Body mass index is 25.21 kg/m².    Intake/Output Summary (Last 24 hours) at 1/31/2023 1508  Last data filed at 1/31/2023 1200  Gross per 24 hour   Intake 895 ml   Output 1975 ml   Net -1080 ml        Physical Exam  Vitals and nursing note reviewed.   Constitutional:       Appearance: Normal appearance. He is ill-appearing.   HENT:      Head: Normocephalic and atraumatic.      Nose: Nose normal.      Mouth/Throat:      Mouth: Mucous membranes are moist.   Eyes:      Extraocular Movements: Extraocular movements intact.   Cardiovascular:      Rate and Rhythm: Normal rate.      Pulses: Normal pulses.   Pulmonary:      Breath sounds: No rales.      Comments: Diminished breath sounds   Abdominal:      General: Abdomen is flat.      Palpations: Abdomen is soft.      Tenderness: There is no abdominal tenderness.   Musculoskeletal:      Right lower leg: No edema.      Left lower leg: No edema.   Skin:     General: Skin is warm.      Capillary Refill: Capillary refill takes less than 2 seconds.   Neurological:      General: No focal deficit present.      Mental Status: He is alert.   Psychiatric:         Mood and Affect: Mood normal.       Significant Labs: All pertinent labs within the past 24 hours have been reviewed.  Blood Culture: No results for  input(s): LABBLOO in the last 48 hours.  BMP:   No results for input(s): GLU, NA, K, CL, CO2, BUN, CREATININE, CALCIUM, MG in the last 48 hours.      Significant Imaging: I have reviewed all pertinent imaging results/findings within the past 24 hours.

## 2023-01-31 NOTE — PROGRESS NOTES
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     Patient reports having worsening shortness of breath, and reports that it was related to the change in weather. He reports having a chronic cough with productive sputum. He denied having chest pain, although in the ER notes that was his presenting complaint. He also had worsening hypoxia at home. The patient is a poor historian. In the ED, he was tachypnic, tachycardic and hypoxic requiring a NRB. Labs showed hypokalemia (3.1), no leukocytosis (4.8), mildly elevated BNP (108), negative troponin (0.013). CXR showed stable examination. CTA showed no PE, stable chronic lung changes, bilateral pleural effusions and pulmonary edema. Lower extremity dopplers were ordered. He was given a Duo-Neb and was admitted for further management.       Overview/Hospital Course:  63 y/o male well known to me from previous admits with lung cancer and COPD presents with chest pain.  Also with worsening dyspnea and increasing oxygen requirements at home.  Acute on chronic respiratory failure.  Probably multifactorial from cancer, COPD, pleural effusions.  Started on nebs, steroids and oxygen.      Interval History: no new complaints.    Review of Systems   HENT:  Negative for ear discharge and ear pain.    Eyes:  Negative for discharge and itching.   Endocrine: Negative for cold intolerance and  heat intolerance.   Neurological:  Negative for seizures and syncope.   Objective:     Vital Signs (Most Recent):  Temp: 97.8 °F (36.6 °C) (01/31/23 0834)  Pulse: 88 (01/31/23 1200)  Resp: 20 (01/31/23 1200)  BP: 128/74 (01/31/23 0834)  SpO2: 97 % (01/31/23 1200) Vital Signs (24h Range):  Temp:  [97.6 °F (36.4 °C)-98.5 °F (36.9 °C)] 97.8 °F (36.6 °C)  Pulse:  [] 88  Resp:  [17-20] 20  SpO2:  [83 %-100 %] 97 %  BP: (111-128)/(61-80) 128/74     Weight: 79.7 kg (175 lb 11.3 oz)  Body mass index is 25.21 kg/m².    Intake/Output Summary (Last 24 hours) at 1/31/2023 1508  Last data filed at 1/31/2023 1200  Gross per 24 hour   Intake 895 ml   Output 1975 ml   Net -1080 ml        Physical Exam  Vitals and nursing note reviewed.   Constitutional:       Appearance: Normal appearance. He is ill-appearing.   HENT:      Head: Normocephalic and atraumatic.      Nose: Nose normal.      Mouth/Throat:      Mouth: Mucous membranes are moist.   Eyes:      Extraocular Movements: Extraocular movements intact.   Cardiovascular:      Rate and Rhythm: Normal rate.      Pulses: Normal pulses.   Pulmonary:      Breath sounds: No rales.      Comments: Diminished breath sounds   Abdominal:      General: Abdomen is flat.      Palpations: Abdomen is soft.      Tenderness: There is no abdominal tenderness.   Musculoskeletal:      Right lower leg: No edema.      Left lower leg: No edema.   Skin:     General: Skin is warm.      Capillary Refill: Capillary refill takes less than 2 seconds.   Neurological:      General: No focal deficit present.      Mental Status: He is alert.   Psychiatric:         Mood and Affect: Mood normal.       Significant Labs: All pertinent labs within the past 24 hours have been reviewed.  Blood Culture: No results for input(s): LABBLOO in the last 48 hours.  BMP:   No results for input(s): GLU, NA, K, CL, CO2, BUN, CREATININE, CALCIUM, MG in the last 48 hours.      Significant Imaging: I have reviewed all pertinent  imaging results/findings within the past 24 hours.      Assessment/Plan:      * Acute on chronic respiratory failure with hypoxia  Patient with Hypoxic Respiratory failure which is Acute on chronic.  he is on home oxygen at 2 LPM. Supplemental oxygen was provided and noted-  .   Signs/symptoms of respiratory failure include- increased work of breathing and respiratory distress. Contributing diagnoses includes - CHF and COPD Labs and images were reviewed. Patient Has recent ABG, which has been reviewed. Will treat underlying causes and adjust management of respiratory failure.  Probably multifactorial secondary to COPD, lung cancer and pleural effusions.  Continue nebs, oxygen and steroids.  Wean oxygen as possible down to home O2.  Possibly home tomorrow.    Persistent atrial fibrillation  .[id number] Score: 3.   Continue B blocker and AFib.    Chronic obstructive pulmonary disease with acute exacerbation  - History of COPD on supplemental O2  - Presented with worsening SOB   - Likely in exacerbation    Plan:   - Continue steroids.  - Duo-Nebs scheduled  - Doxycyline     Coronary artery disease involving native coronary artery of native heart without angina pectoris  Resume aspirin, plavix, and statin    PAD (peripheral artery disease)  Continue home medications     Chronic diastolic heart failure  - Echocardiogram (2/22/2022): EF: 35%-40%, GIDD, normal RV systolic function, mild TR   -Echocardiogram (5/18/2022): EF: 65%, indeterminate LV diastolic function, mild TR, PA pressure of 54 mmHg  -Echocardiogram (9/30/2022): EF: 60%, mildly reduced RV systolic function, PA pressure of 40 mmHg.   -Elevated BNP, below baseline   No exacerbation.  Continue home lasix.    Essential hypertension  Continue current management.      Lung cancer, main bronchus, left  Continue treatment and follow up with Oncology.         VTE Risk Mitigation (From admission, onward)         Ordered     apixaban tablet 5 mg  2 times daily          01/28/23 2125     IP VTE HIGH RISK PATIENT  Once         01/28/23 2125     Place sequential compression device  Until discontinued         01/28/23 2125                Discharge Planning   AZ: 2/2/2023     Code Status: Full Code   Is the patient medically ready for discharge?:     Reason for patient still in hospital (select all that apply): Patient trending condition  Discharge Plan A: Home with family (Follow-up)                  Ant Martin MD  Department of [facility]   [facility]

## 2023-01-31 NOTE — PLAN OF CARE
Problem: Breathing Pattern Ineffective  Goal: Effective Breathing Pattern  Outcome: Ongoing, Progressing  Intervention: Promote Improved Breathing Pattern  Flowsheets (Taken 1/30/2023 1808)  Airway/Ventilation Management:   calming measures promoted   humidification applied  Breathing Techniques/Airway Clearance:   deep/controlled cough encouraged   bonner-cough encouraged   pursed-lip breathing encouraged  Supportive Measures:   active listening utilized   problem-solving facilitated   self-care encouraged   self-reflection promoted  Head of Bed (HOB) Positioning: HOB elevated   Able to wean patient to 5L NC. Will continue to monitor.

## 2023-01-31 NOTE — ASSESSMENT & PLAN NOTE
Patient with Hypoxic Respiratory failure which is Acute on chronic.  he is on home oxygen at 2 LPM. Supplemental oxygen was provided and noted-  .   Signs/symptoms of respiratory failure include- increased work of breathing and respiratory distress. Contributing diagnoses includes - CHF and COPD Labs and images were reviewed. Patient Has recent ABG, which has been reviewed. Will treat underlying causes and adjust management of respiratory failure.  Probably multifactorial secondary to COPD, lung cancer and pleural effusions.  Continue nebs, oxygen and steroids.  Wean oxygen as possible down to home O2.  Possibly home tomorrow.

## 2023-01-31 NOTE — PLAN OF CARE
01/31/23 1453   Medicare Message   Important Message from Medicare regarding Discharge Appeal Rights Explained to patient/caregiver  (SW explained IMM. Pt's wife, Natty verbally expressed understanding. RODOLFO will mail a copy to address of file.)   Date IMM was signed 01/31/23   Time IMM was signed 1456     [id number] [phone]

## 2023-02-01 PROBLEM — J44.1 CHRONIC OBSTRUCTIVE PULMONARY DISEASE WITH ACUTE EXACERBATION: Status: RESOLVED | Noted: 2022-01-01 | Resolved: 2023-01-01

## 2023-02-01 PROBLEM — J96.21 ACUTE ON CHRONIC RESPIRATORY FAILURE WITH HYPOXIA: Status: RESOLVED | Noted: 2022-06-10 | Resolved: 2023-01-01

## 2023-02-01 NOTE — PLAN OF CARE
Problem: Adult Inpatient Plan of Care  Goal: Plan of Care Review  Outcome: Met  Goal: Patient-Specific Goal (Individualized)  Outcome: Met  Goal: Absence of Hospital-Acquired Illness or Injury  Outcome: Met  Goal: Optimal Comfort and Wellbeing  Outcome: Met  Goal: Readiness for Transition of Care  Outcome: Met     Problem: Fluid Imbalance (Pneumonia)  Goal: Fluid Balance  Outcome: Met     Problem: Infection (Pneumonia)  Goal: Resolution of Infection Signs and Symptoms  Outcome: Met     Problem: Respiratory Compromise (Pneumonia)  Goal: Effective Oxygenation and Ventilation  Outcome: Met     Problem: Infection  Goal: Absence of Infection Signs and Symptoms  Outcome: Met     Problem: Coping Ineffective  Goal: Effective Coping  Outcome: Met     Problem: Breathing Pattern Ineffective  Goal: Effective Breathing Pattern  Outcome: Met     Problem: Skin Injury Risk Increased  Goal: Skin Health and Integrity  Outcome: Met     Problem: Fall Injury Risk  Goal: Absence of Fall and Fall-Related Injury  Outcome: Met     Problem: Thought Process Alteration  Goal: Optimal Thought Clarity  Outcome: Met

## 2023-02-01 NOTE — NURSING
Attempting to reach pt's spouse to notify her pt's home oxygen tank is not in room. Have called multiple times, no answer. Pt awaiting  by spouse.

## 2023-02-01 NOTE — PLAN OF CARE
[facility] - Telemetry  Discharge Final Note    Primary Care Provider: Eduardo Ruiz MD    Expected Discharge Date: 2/1/2023    CM notified nurse, Carl that pt is ready for discharge from CM standpoint. All CM needs are met.    Final Discharge Note (most recent)       Final Note - 02/01/23 0929          Final Note    Assessment Type Final Discharge Note (P)      Anticipated Discharge Disposition Home or Self Care (P)      What phone number can be called within the next 1-3 days to see how you are doing after discharge? [phone] (P)      Hospital Resources/Appts/Education Provided Appointments scheduled and added to AVS;Appointments scheduled by Navigator/Coordinator (P)         Post-Acute Status    Coverage Medicare AB (P)      Discharge Delays None known at this time (P)                      Important Message from Medicare  Important Message from Medicare regarding Discharge Appeal Rights: Explained to patient/caregiver (SW explained IMM. Pt's wifeNatty verbally expressed understanding. RODOLFO will mail a copy to address of file.)     Date IMM was signed: 01/31/23  Time IMM was signed: [id number]

## 2023-02-01 NOTE — PROGRESS NOTES
CM contacted pt's wife, Natty and she refused home health. CM informed pt's wife to contact pt's PCP if she changed her mind.

## 2023-02-01 NOTE — DISCHARGE SUMMARY
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     Patient reports having worsening shortness of breath, and reports that it was related to the change in weather. He reports having a chronic cough with productive sputum. He denied having chest pain, although in the ER notes that was his presenting complaint. He also had worsening hypoxia at home. The patient is a poor historian. In the ED, he was tachypnic, tachycardic and hypoxic requiring a NRB. Labs showed hypokalemia (3.1), no leukocytosis (4.8), mildly elevated BNP (108), negative troponin (0.013). CXR showed stable examination. CTA showed no PE, stable chronic lung changes, bilateral pleural effusions and pulmonary edema. Lower extremity dopplers were ordered. He was given a Duo-Neb and was admitted for further management.       * No surgery found *      Hospital Course:   63 y/o male well known to me from previous admits with lung cancer and COPD presents with chest pain.  Also with worsening dyspnea and increasing oxygen requirements at home.  Acute on chronic respiratory failure.  Probably multifactorial from cancer, COPD, pleural effusions.  Started on nebs, steroids, doxy and oxygen with slow improvement.  Patient's O2 was able to be slowly weaned down during hospital stay.   Palliative Care followed patient during hospital stay as they have seen him in past.  He has remained afebrile and hemodynamically stable.  Patient doing well on 3L per NC.  This is probably his new baseline O2 requirement.  He will be discharged home to follow up with PCP and Oncology.       Goals of Care Treatment Preferences:  Code Status: Full Code          What is most important right now is to focus on spending time at home, avoiding the hospital, remaining as independent as possible, symptom/pain control, quality of life, even if it means sacrificing a little time.  Accordingly, we have decided that the best plan to meet the patient's goals includes enrolling in hospice care.      Consults:   Consults (From admission, onward)        Status Ordering Provider     Inpatient consult to Palliative Care  Once        Provider:  RYAN Voss ALBERTO M.          No new Assessment & Plan notes have been filed under this hospital service since the last note was generated.  Service: Hospital Medicine    Final Active Diagnoses:    Diagnosis Date Noted POA    Persistent atrial fibrillation [I48.19] 09/30/2022 Yes    PAD (peripheral artery disease) [I73.9] 03/14/2022 Yes    Coronary artery disease involving native coronary artery of native heart without angina pectoris [I25.10] 03/14/2022 Yes     Chronic    Chronic diastolic heart failure [I50.32] 02/23/2022 Yes    Essential hypertension [I10]  Yes     Chronic    Lung cancer, main bronchus, left [C34.02] 09/10/2019 Yes     Chronic      Problems Resolved During this Admission:    Diagnosis Date Noted Date Resolved POA    PRINCIPAL PROBLEM:  Acute on chronic respiratory failure with hypoxia [J96.21] 06/10/2022 02/01/2023 Yes    Chronic obstructive pulmonary disease with acute exacerbation [J44.1] 09/05/2022 02/01/2023 Yes       Discharged Condition: stable    Disposition: Home-Health Care [facility]    Follow Up:   Follow-up Information     Eduardo  RAUDEL Ruiz MD Follow up in 1 week(s).    Specialty: Family Medicine  Contact information:  [address] [id number] [phone]                       Patient Instructions:      Diet Cardiac     Notify your health care provider if you experience any of the following:  temperature >100.4     Notify your health care provider if you experience any of the following:  persistent nausea and vomiting or diarrhea     Notify your health care provider if you experience any of the following:  severe uncontrolled pain     Notify your health care provider if you experience any of the following:  difficulty breathing or increased cough     Notify your health care provider if you experience any of the following:  persistent dizziness, light-headedness, or visual disturbances     Notify your health care provider if you experience any of the following:  increased confusion or weakness     Activity as tolerated           Pending Diagnostic Studies:     None         Medications:  Reconciled Home Medications:      Medication List      START taking these medications    doxycycline 100 MG tablet  Commonly known as: VIBRA-TABS  Take 1 tablet (100 mg total) by mouth every 12 (twelve) hours. for 3 days     predniSONE 20 MG tablet  Commonly known as: DELTASONE  Take 1 tablet (20 mg total) by mouth once daily for 3 days, THEN 0.5 tablets (10 mg total) once daily for 3 days.  Start taking on: February 2, 2023        CONTINUE taking these medications    albuterol 90 mcg/actuation inhaler  Commonly known as: VENTOLIN HFA  Inhale 2 puffs into the lungs every 6 (six) hours as needed for Wheezing. Rescue     albuterol-ipratropium 2.5 mg-0.5 mg/3 mL nebulizer solution  Commonly known as: DUO-NEB  Inhale contents of 1 vial (3 mLs) by nebulization every 4 (four) hours as needed for Wheezing or Shortness of Breath. Rescue     amLODIPine 10 MG tablet  Commonly known as: NORVASC  Take 1 tablet (10 mg total) by mouth once daily.      apixaban 5 mg Tab  Commonly known as: ELIQUIS  Take 1 tablet (5 mg total) by mouth 2 (two) times daily.     aspirin 81 MG Chew  Take 1 tablet (81 mg total) by mouth once daily.     atorvastatin 80 MG tablet  Commonly known as: LIPITOR  Take 1 tablet (80 mg total) by mouth once daily.     cilostazoL 100 MG Tab  Commonly known as: PLETAL  Take 1 tablet (100 mg total) by mouth 2 (two) times daily.     clopidogreL 75 mg tablet  Commonly known as: PLAVIX  Take 1 tablet (75 mg total) by mouth once daily.     furosemide 40 MG tablet  Commonly known as: LASIX  Take 1 tablet (40 mg total) by mouth 2 (two) times a day.     GAVILAX 17 gram/dose powder  Generic drug: polyethylene glycol  Mix 1 capful (17 grams total) with a liquid and take by mouth once daily.     HYDROcodone-acetaminophen 7.5-325 mg per tablet  Commonly known as: NORCO  Take 1 tablet by mouth every 4 (four) hours as needed. for pain.     metoprolol tartrate 50 MG tablet  Commonly known as: LOPRESSOR  Take 1 tablet (50 mg total) by mouth 3 (three) times daily.     SPIRIVA WITH HANDIHALER 18 mcg inhalation capsule  Generic drug: tiotropium  Inhale 1 capsule (18 mcg total) into the lungs via handihaler once daily. Controller     VITAMIN C ENERGY BOOSTER 1,000 mg Pwep  Generic drug: ascorbic acid-multivit-min  Take 3,000 mg by mouth once daily. Patient takes 3,000 mg per day     WIXELA INHUB 100-50 mcg/dose diskus inhaler  Generic drug: fluticasone-salmeterol 100-50 mcg/dose  Inhale 1 puff into the lungs 2 (two) times daily. Controller            Indwelling Lines/Drains at time of discharge:   Lines/Drains/Airways     Central Venous Catheter Line  Duration           Port A Cath Single Lumen right subclavian -- days                Time spent on the discharge of patient: >30 minutes         Ant Martin MD  Department of [facility] Medicine  [facility]

## 2023-02-01 NOTE — PLAN OF CARE
"    [facility] Telemetry    HOME HEALTH ORDERS  FACE TO FACE ENCOUNTER    Patient Name: Kashif Iverson  YOB: 1960    PCP: Eduardo Ruiz MD   PCP Address: [address] / [place] [address]  PCP Phone Number: [phone]  PCP Fax: [phone]       Encounter Date: 02/01/2023    Admit to Home Health    Diagnoses:  Active Hospital Problems    Diagnosis  POA    Persistent atrial fibrillation [I48.19]  Yes    PAD (peripheral artery disease) [I73.9]  Yes     LUIS FELIPE 3/11/22      Coronary artery disease involving native coronary artery of native heart without angina pectoris [I25.10]  Yes     Chronic    Chronic diastolic heart failure [I50.32]  Yes    Essential hypertension [I10]  Yes     Chronic    Lung cancer, main bronchus, left [C34.02]  Yes     Chronic     8/30/2019 underwent bronchoscopy that showed "A partially obstructing (about 80% obstructed) mass was found in the middle portion in the left mainstem bronchus. The mass was large and bloody, circumferential, endobronchial, friable and necrotic. 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  No actionable mutations and PD-L1 5%.  9/19/21 staging PET CT showed "Hypermetabolic left lung mass concerning for primary pulmonary malignancy with metastatic disease involving the right 6th and 9th ribs as well as mediastinal and left supraclavicular lymph nodes."  Stage IV squamous cell carcinoma of the lung at diagnosis.    10/8/2019-10/21/2019 he received palliative chemoradiation for rib pain with carboplatin and paclitaxel.  He received 3 cycles of carboplatin and paclitaxel.  He developed anaphylactic reaction to paclitaxel.  The chest was treated with AP:PA fields and the right 9th rib was treated with anterior and posterior oblique fields at 300 cGy per fraction to 3000 cGy.   Lt chest 6X 300 10 / 10 3,000   Rt 9th rib 6X 300 10 / 10 3,000     10/31/2019-9/10/2020 he " received pembrolizumab (completed 15 cycles, last dose 8/21/2020)  9/10/2020 PET CT showed progression of disease.  He was then referred to Dr. Key for evaluation for clinical trials.  10/20/2020 he underwent repeat biopsy for LUNG MAP trial and was randomized to Ramucirumab + pembrolizumab.    11/17/2020 started ramucirumab+pembrolizumab.  Completed 4 cycles and then 2/10/2021 scans showed progression.    2/24/2021 he was subsequent started on gemcitabine with Dr. Cruz.        Resolved Hospital Problems    Diagnosis Date Resolved POA    *Acute on chronic respiratory failure with hypoxia [J96.21] 02/01/2023 Yes     Priority: 1 - High    Chronic obstructive pulmonary disease with acute exacerbation [J44.1] 02/01/2023 Yes       Future Appointments   Date Time Provider Department Center   2/3/2023  1:00 PM CHAIR 05 [facility] WB CHEMO [facility]   2/7/2023  8:00 AM Guerline Higgins MD [facility] HEM ONC [facility]   2/7/2023  2:00 PM Pamela Boyle NP [facility] MED [place] [facility]   2/17/2023  1:00 PM CHAIR 05 [facility] [place]      Follow-up Information       Eduardo Ruiz MD Follow up in 1 week(s).    Specialty: Family Medicine  Contact information:  [id number] [id number] [phone]                               I have seen and examined this patient face to face today. My clinical findings that support the need for the home health skilled services and home bound status are the following:  Weakness/numbness causing balance and gait disturbance due to COPD Exacerbation and Malignancy/Cancer making it taxing to leave home.    Allergies:Review of patient's allergies indicates:  No Known Allergies    Diet: cardiac diet    Activities: activity as tolerated    Nursing:   SN to complete comprehensive assessment including routine vital signs. Instruct on disease process and s/s of complications to report to MD. Review/verify medication list sent home with the patient at time of discharge  and  instruct patient/caregiver as needed. Frequency may be adjusted depending on start of care date.    Notify MD if SBP > 160 or < 90; DBP > 90 or < 50; HR > 120 or < 50; Temp > 101      CONSULTS:    Physical Therapy to evaluate and treat. Evaluate for home safety and equipment needs; Establish/upgrade home exercise program. Perform / instruct on therapeutic exercises, gait training, transfer training, and Range of Motion.   to evaluate for community resources/long-range planning.  Aide to provide assistance with personal care, ADLs, and vital signs.    MISCELLANEOUS CARE:  Home Oxygen:  Oxygen at 3 L/min nasal canula to be used:  Continuously.      Medications: Review discharge medications with patient and family and provide education.      Current Discharge Medication List        START taking these medications    Details   doxycycline (VIBRA-TABS) 100 MG tablet Take 1 tablet (100 mg total) by mouth every 12 (twelve) hours. for 3 days  Qty: 6 tablet, Refills: 0      predniSONE (DELTASONE) 20 MG tablet Take 1 tablet (20 mg total) by mouth once daily for 3 days, THEN 0.5 tablets (10 mg total) once daily for 3 days.  Qty: 5 tablet, Refills: 0           CONTINUE these medications which have NOT CHANGED    Details   albuterol (VENTOLIN HFA) 90 mcg/actuation inhaler Inhale 2 puffs into the lungs every 6 (six) hours as needed for Wheezing. Rescue  Qty: 18 g, Refills: 11      albuterol-ipratropium (DUO-NEB) 2.5 mg-0.5 mg/3 mL nebulizer solution Inhale contents of 1 vial (3 mLs) by nebulization every 4 (four) hours as needed for Wheezing or Shortness of Breath. Rescue  Qty: 90 mL, Refills: 1      amLODIPine (NORVASC) 10 MG tablet Take 1 tablet (10 mg total) by mouth once daily.  Qty: 90 tablet, Refills: 3    Comments: .      apixaban (ELIQUIS) 5 mg Tab Take 1 tablet (5 mg total) by mouth 2 (two) times daily.  Qty: 60 tablet, Refills: 1      ascorbic acid-multivit-min (VITAMIN C ENERGY BOOSTER) 1,000 mg PwEP Take  3,000 mg by mouth once daily. Patient takes 3,000 mg per day      aspirin 81 MG Chew Take 1 tablet (81 mg total) by mouth once daily.  Qty: 30 tablet, Refills: 11      atorvastatin (LIPITOR) 80 MG tablet Take 1 tablet (80 mg total) by mouth once daily.  Qty: 90 tablet, Refills: 3      cilostazoL (PLETAL) 100 MG Tab Take 1 tablet (100 mg total) by mouth 2 (two) times daily.  Qty: 60 tablet, Refills: 11    Associated Diagnoses: Peripheral arterial disease      clopidogreL (PLAVIX) 75 mg tablet Take 1 tablet (75 mg total) by mouth once daily.  Qty: 30 tablet, Refills: 11      fluticasone-salmeterol diskus inhaler 100-50 mcg Inhale 1 puff into the lungs 2 (two) times daily. Controller  Qty: 60 each, Refills: 1      furosemide (LASIX) 40 MG tablet Take 1 tablet (40 mg total) by mouth 2 (two) times a day.  Qty: 60 tablet, Refills: 2    Associated Diagnoses: Localized edema      HYDROcodone-acetaminophen (NORCO) 7.5-325 mg per tablet Take 1 tablet by mouth every 4 (four) hours as needed. for pain.      metoprolol tartrate (LOPRESSOR) 50 MG tablet Take 1 tablet (50 mg total) by mouth 3 (three) times daily.  Qty: 90 tablet, Refills: 11    Comments: .      polyethylene glycol (GLYCOLAX) 17 gram/dose powder Mix 1 capful (17 grams total) with a liquid and take by mouth once daily.  Qty: 510 g, Refills: 0      tiotropium (SPIRIVA) 18 mcg inhalation capsule Inhale 1 capsule (18 mcg total) into the lungs via handihaler once daily. Controller  Qty: 30 capsule, Refills: 1             I certify that this patient is confined to his home and needs intermittent skilled nursing care.

## 2023-02-07 NOTE — Clinical Note
-RTC in 3 weeks for MD and chemo after CT scan -Labs next visit (CMP, CBC, Mg) -Please hold tx until next visit due to recent hospitalization Isotretinoin Counseling: Patient should get monthly blood tests, not donate blood, not drive at night if vision affected, not share medication, and not undergo elective surgery for 6 months after tx completed. Side effects reviewed, pt to contact office should one occur.

## 2023-02-07 NOTE — PROGRESS NOTES
"  PATIENT: Kashif Iverson  MRN: [id number]  DATE: 2/7/2023      Diagnosis:   1. Malignant neoplasm of unspecified part of unspecified bronchus or lung                  Chief Complaint: Lung Cancer, COPD, and Follow-up        Oncologic History:    Oncologic History 1. 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 Palliative chemoradiation with carboplatin and paclitaxel; anaphylactic reaction to paclitaxel  2. Pembrolizumab  3. Pembrolizumab+ramucirumab (LungMAP trial)  4. Gemcitabine    Pathology Squamous cell carcinoma, no actionable mutations, PD-L1 5%        Subjective:    Interval History: Mr. Iverson returns for follow up.     8/30/2019 underwent bronchoscopy that showed "A partially obstructing (about 80% obstructed) mass was found in the middle portion in the left mainstem bronchus. The mass was large and bloody, circumferential, endobronchial, friable and necrotic. 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  No actionable mutations and PD-L1 5%.  9/19/21 staging PET CT showed "Hypermetabolic left lung mass concerning for primary pulmonary malignancy with metastatic disease involving the right 6th and 9th ribs as well as mediastinal and left supraclavicular lymph nodes."  Stage IV squamous cell carcinoma of the lung at diagnosis.    10/8/2019-10/21/2019 he received palliative chemoradiation for rib pain with carboplatin and paclitaxel.  He received 3 cycles of carboplatin and paclitaxel.  He developed anaphylactic reaction to paclitaxel.  The chest was treated with AP:PA fields and the right 9th rib was treated with anterior and posterior oblique fields at 300 cGy per fraction to 3000 cGy.   Lt chest 6X 300 10 / 10 3,000   Rt 9th rib 6X 300 10 / 10 3,000     10/31/2019-9/10/2020 he received pembrolizumab (completed 15 cycles, last dose 8/21/2020)  9/10/2020 PET CT showed progression of disease.  He was then " referred to Dr. Key for evaluation for clinical trials.  10/20/2020 he underwent repeat biopsy for LUNG MAP trial and was randomized to Ramucirumab + pembrolizumab.    11/17/2020 started ramucirumab+pembrolizumab.  Completed 4 cycles and then 2/10/2021 scans showed progression.    2/24/2021 he was subsequent started on gemcitabine with Dr. Cruz.  His care was transferred to Dr. Romo who continued his current regimen and now is currently under  my care after following with Dr. Hathaway.   5/17/22-5/22/22 admitted for acute respiratory failure secondary to heart failure exacerbation requiring intubation.  He was extubated and diuresed successfully.  8/15/22-8/20/22 admitted for acute hypoxic respiratory failure secondary to heart failure; diuresed and discharged with supplemental oxygen        Interval hx:      Pt feels well and denied any issues at time of visit and is almost back at baseline from recent hospitalization.  Pt has had many hospitalizations from COPD exacerbations.  Will hold chemo until after next restaging scan.  Pt informed that his COPD poses more of a risk than his cancer at this time.  Will also discuss hospice for COPD with palliative before next visit.  Wife accompanies him at this visit.    Past Medical History:   Past Medical History:   Diagnosis Date    Chronic obstructive pulmonary disease with acute exacerbation 9/5/2022    Combined systolic and diastolic heart failure     Dependence on supplemental oxygen 5/24/2022    History of completed stroke 9/3/2019     09/03/2019 On CT exam    History of non-ST elevation myocardial infarction (NSTEMI) 2/22/2022    Hypertension     Lung cancer     NSCLC    Lung mass     left lung    Macrocytic anemia 8/24/2019    PAD (peripheral artery disease) 3/14/2022    LUIS FELIPE 3/11/22    Peripheral artery disease        Past Surgical HIstory:   Past Surgical History:   Procedure Laterality Date    BRONCHOSCOPY N/A 08/30/2019    Procedure: Bronchoscopy;  Surgeon:  [facility] Provider;  Location: [facility] OR [address] [place] [address];  Service: Anesthesiology;  Laterality: N/A;    CORONARY ANGIOGRAPHY N/A 03/04/2022    Procedure: ANGIOGRAM, CORONARY ARTERY;  Surgeon: Robosn Green MD;  Location: [facility] CATH LAB;  Service: Cardiology;  Laterality: N/A;    INSERTION OF TUNNELED CENTRAL VENOUS CATHETER (CVC) WITH SUBCUTANEOUS PORT         Family History:   Family History   Problem Relation Age of Onset    Diabetes Mother     Heart defect Mother     Cancer Father     Cancer Sister     No Known Problems Son        Social History:  reports that he quit smoking about 3 years ago. His smoking use included cigarettes. He has a 25.00 pack-year smoking history. He has never used smokeless tobacco. He reports current alcohol use. He reports that he does not use drugs.    Allergies:  Review of patient's allergies indicates:  No Known Allergies    Medications:  Current Outpatient Medications   Medication Sig Dispense Refill    albuterol (VENTOLIN HFA) 90 mcg/actuation inhaler Inhale 2 puffs into the lungs every 6 (six) hours as needed for Wheezing. Rescue 18 g 11    albuterol-ipratropium (DUO-NEB) 2.5 mg-0.5 mg/3 mL nebulizer solution Inhale contents of 1 vial (3 mLs) by nebulization every 4 (four) hours as needed for Wheezing or Shortness of Breath. Rescue 90 mL 1    amLODIPine (NORVASC) 10 MG tablet Take 1 tablet (10 mg total) by mouth once daily. 90 tablet 3    apixaban (ELIQUIS) 5 mg Tab Take 1 tablet (5 mg total) by mouth 2 (two) times daily. 60 tablet 1    ascorbic acid-multivit-min (VITAMIN C ENERGY BOOSTER) 1,000 mg PwEP Take 3,000 mg by mouth once daily. Patient takes 3,000 mg per day      aspirin 81 MG Chew Take 1 tablet (81 mg total) by mouth once daily. 30 tablet 11    atorvastatin (LIPITOR) 80 MG tablet Take 1 tablet (80 mg total) by mouth once daily. 90 tablet 3    cilostazoL (PLETAL) 100 MG Tab Take 1 tablet (100 mg total) by mouth 2 (two) times daily. 60 tablet 11    clopidogreL (PLAVIX) 75  mg tablet Take 1 tablet (75 mg total) by mouth once daily. 30 tablet 11    fluticasone-salmeterol diskus inhaler 100-50 mcg Inhale 1 puff into the lungs 2 (two) times daily. Controller 60 each 1    furosemide (LASIX) 40 MG tablet Take 1 tablet (40 mg total) by mouth 2 (two) times a day. 60 tablet 2    HYDROcodone-acetaminophen (NORCO) 7.5-325 mg per tablet Take 1 tablet by mouth every 4 (four) hours as needed. for pain.      metoprolol tartrate (LOPRESSOR) 50 MG tablet Take 1 tablet (50 mg total) by mouth 3 (three) times daily. 90 tablet 11    polyethylene glycol (GLYCOLAX) 17 gram/dose powder Mix 1 capful (17 grams total) with a liquid and take by mouth once daily. 510 g 0    predniSONE (DELTASONE) 20 MG tablet Take 1 tablet (20 mg total) by mouth once daily for 3 days, THEN 0.5 tablets (10 mg total) once daily for 3 days. 5 tablet 0    tiotropium (SPIRIVA) 18 mcg inhalation capsule Inhale 1 capsule (18 mcg total) into the lungs via handihaler once daily. Controller 30 capsule 1     No current facility-administered medications for this visit.     Facility-Administered Medications Ordered in Other Visits   Medication Dose Route Frequency Provider Last Rate Last Admin    heparin, porcine (PF) 100 unit/mL injection flush 500 Units  500 Units Intravenous PRN Sameera Amador MD   500 Units at 01/20/23 1652    sodium chloride 0.9% flush 10 mL  10 mL Intravenous PRN Sameera Amador MD   10 mL at 01/20/23 1652       Review of Systems   Constitutional:  Negative for activity change, appetite change, chills, diaphoresis, fatigue, fever and unexpected weight change.   HENT:  Negative for nosebleeds and trouble swallowing.    Eyes:  Negative for visual disturbance.   Respiratory:  Negative for cough, chest tightness, shortness of breath and wheezing.    Cardiovascular:  Negative for chest pain and leg swelling.   Gastrointestinal:  Negative for abdominal distention, abdominal pain, blood in stool, constipation, diarrhea,  "nausea and vomiting.   Endocrine: Negative for cold intolerance and heat intolerance.   Genitourinary:  Negative for difficulty urinating and dysuria.   Musculoskeletal:  Negative for arthralgias, back pain and myalgias.   Skin:  Negative for color change.   Neurological:  Negative for dizziness, weakness, light-headedness, numbness and headaches.   Hematological:  Negative for adenopathy. Does not bruise/bleed easily.   Psychiatric/Behavioral:  Negative for confusion.      ECOG Performance Status:     ECOG SCORE             Objective:      Vitals:   Vitals:    02/07/23 0821   BP: (!) 155/94   BP Location: Left arm   Patient Position: Sitting   BP Method: Large (Automatic)   Pulse: 74   SpO2: (!) 90%   Weight: 83.4 kg (183 lb 13.8 oz)   Height: 5' 10" (1.778 m)             BMI: Body mass index is 26.38 kg/m².    Physical Exam  Constitutional:       General: He is not in acute distress.     Appearance: Normal appearance. He is not ill-appearing.   HENT:      Head: Normocephalic and atraumatic.      Nose: Nose normal.      Mouth/Throat:      Pharynx: No oropharyngeal exudate or posterior oropharyngeal erythema.   Eyes:      General: No scleral icterus.     Extraocular Movements: Extraocular movements intact.      Conjunctiva/sclera: Conjunctivae normal.      Pupils: Pupils are equal, round, and reactive to light.   Cardiovascular:      Rate and Rhythm: Normal rate and regular rhythm.      Heart sounds: No murmur heard.    No friction rub. No gallop.      Comments: Right chest wall port c/d/i  Pulmonary:      Effort: Pulmonary effort is normal. No respiratory distress.      Breath sounds: No stridor. No wheezing, rhonchi or rales.   Abdominal:      General: Bowel sounds are normal. There is no distension.      Palpations: Abdomen is soft. There is no mass.      Tenderness: There is no abdominal tenderness. There is no guarding or rebound.   Musculoskeletal:         General: No swelling or tenderness. Normal range of " motion.      Cervical back: Normal range of motion and neck supple.      Right lower leg: No edema.      Left lower leg: No edema.   Skin:     General: Skin is warm and dry.      Findings: No bruising.   Neurological:      General: No focal deficit present.      Mental Status: He is alert.       Laboratory Data:   No results found for this or any previous visit (from the past 168 hour(s)).              Imaging:       CT Chest Abdomen Pelvis With Contrast (xpd)    Result Date: 8/25/2022  EXAMINATION: CT CHEST ABDOMEN PELVIS WITH CONTRAST (XPD) CLINICAL HISTORY: metastatic lung cancer on gemcitabine, eval response; Malignant neoplasm of left main bronchus TECHNIQUE: Low dose axial images, sagittal and coronal reformations were obtained from the thoracic inlet to the pubic symphysis following the IV administration of 85 mL of Omnipaque 350 and the oral administration of 450 ml of Readi-Cat barium suspension. COMPARISON: 05/26/2022. FINDINGS: There is a right internal jugular Port-A-Cath present with tip located within the right atrium.  There is a left supraclavicular soft tissue density nodule measuring approximately 3.0 x 1.5 cm (image 16, series 2), not significantly changed.  The heart is not enlarged.  There is a moderate amount of atherosclerotic calcification identified within the coronary arteries in a multi-vessel distribution.  Atherosclerotic calcification is also present within the thoracic aorta which is normal in caliber without evidence for aortic dissection or aneurysmal dilatation.  No hilar or mediastinal adenopathy is identified.  No axillary adenopathy is identified.  Once again, there is occlusion of the left upper lobe bronchus with band of soft tissue density within the left upper lobe extending from the hilum much of which represents atelectasis and parenchymal scarring.  There is also a more nodular opacity within the left upper lobe medially measuring 2.1 x 1.5 cm (image 154, series 6)  compared to 1.7 x 1.1 cm on the prior examination.  This is worrisome for enlarging malignancy.  There is also a wedge-shaped opacity within the superior left lower lobe which is unchanged and likely represents an area of atelectasis/scarring.  There is a stable 5 mm pulmonary nodule within the left lower lobe laterally (image 312, series 6).  No new nodules are identified.  There are moderate centrilobular emphysematous changes.  Bony structures appear intact.  There is no evidence for acute fracture or bone destruction. The liver is normal in size and is homogeneous in density with no focal liver lesions identified.  The gallbladder is contracted.  There is no evidence for intrahepatic or extrahepatic biliary dilatation.  The portal venous system is patent.  The spleen and stomach appear unremarkable.  There are a few pancreatic calcifications present which may be related to chronic pancreatitis.  No focal pancreatic lesions are identified.  The adrenal glands are not enlarged.  The kidneys are normal in size and there is no evidence for abnormal renal masses or hydronephrosis.  The kidneys are noted to concentrate contrast symmetrically.  Atherosclerotic calcification is present within the abdominal aorta which demonstrates ectasia without aneurysmal dilatation.  No para-aortic lymphadenopathy is identified.  The appendix is present and appears unremarkable.  No dilated loops of bowel are evident.  The urinary bladder appears grossly unremarkable.  There are multiple prostatic calcifications present. There is no evidence for pelvic or inguinal lymphadenopathy.     Enlarging nodule within the left upper lobe medially now measuring 2.1 x 1.5 cm compared to 1.7 x 1.1 cm on prior examination dated 05/26/2022.  This is worrisome for malignancy. Left supraclavicular soft tissue density nodule measuring 3.0 x 1.5 cm, not significantly changed..  This is an area of prior biopsy proven malignancy. Occlusion of the left  "upper lobe bronchus with probable atelectasis/scarring within the left upper lobe extending from the hilum, not significantly changed.  There is also a wedge-shaped opacity within the superior left lower lobe which is also unchanged and likely represents an area of atelectasis/scarring. 5mm pulmonary nodule within the left lower lobe laterally, stable dating back to 11/06/2020. Moderate centrilobular emphysema. Electronically signed by: Rayshawn Degroot MD Date:    08/25/2022 Time:    13:21    Assessment/Plan:         ECOG 1    Lung cancer, main bronchus, left - Primary (Chronic)  8/30/2019 underwent bronchoscopy that showed "A partially obstructing (about 80% obstructed) mass was found in the middle portion in the left mainstem bronchus. The mass was large and bloody, circumferential, endobronchial, friable and necrotic. 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  No actionable mutations and PD-L1 5%.  9/19/21 staging PET CT showed "Hypermetabolic left lung mass concerning for primary pulmonary malignancy with metastatic disease involving the right 6th and 9th ribs as well as mediastinal and left supraclavicular lymph nodes."  Stage IV squamous cell carcinoma of the lung at diagnosis.     10/8/2019-10/21/2019 he received palliative chemoradiation for rib pain with carboplatin and paclitaxel.  He received 3 cycles of carboplatin and paclitaxel.  He developed anaphylactic reaction to paclitaxel.  The chest was treated with AP:PA fields and the right 9th rib was treated with anterior and posterior oblique fields at 300 cGy per fraction to 3000 cGy.   Lt chest 6X 300 10 / 10 3,000   Rt 9th rib 6X 300 10 / 10 3,000      10/31/2019-9/10/2020 he received pembrolizumab (completed 15 cycles, last dose 8/21/2020)  9/10/2020 PET CT showed progression of disease.  He was then referred to Dr. Key for evaluation for clinical trials.  10/20/2020 he " underwent repeat biopsy for LUNG MAP trial and was randomized to Ramucirumab + pembrolizumab.     11/17/2020 started ramucirumab+pembrolizumab.  Completed 4 cycles and then 2/10/2021 scans showed progression.     2/24/2021 he was subsequent started on gemcitabine with Dr. Cruz.    -Treated in 9/22 with XRT for OPAL lesion        A/P 2/07/23  -Restart gemzar once pt fully recovers  -Will obtain restaging scan before next visit  -RTC in 3 weeks for MD, labs and next chemo with scan results        Chronic combined systolic and diastolic heart failure (Chronic)/Chronic hypoxia  -Improving since discharge  -Cause of most of his many hospitalizations  -Will discuss hospice for COPD with palliative team before next visit      Left shoulder pain  -chronic  -No signs of cancer of fracture on Xray  -Trial of tramadol and referral to pain clinic placed on prior visit      Time spent with pt: 30 minutes      Problem List Items Addressed This Visit    None  Visit Diagnoses       Malignant neoplasm of unspecified part of unspecified bronchus or lung    -  Primary    Relevant Orders    CT Chest Abdomen Pelvis With Contrast (xpd)    COMPREHENSIVE METABOLIC PANEL    CBC W/ AUTO DIFFERENTIAL    Magnesium              Orders Placed This Encounter   Procedures    CT Chest Abdomen Pelvis With Contrast (xpd)    COMPREHENSIVE METABOLIC PANEL    CBC W/ AUTO DIFFERENTIAL    Magnesium                 Guerline Higgins MD  Hematology Oncology

## 2023-02-14 NOTE — PROGRESS NOTES
Subjective:      Patient ID: Kashif Iverson is a 62 y.o. male.  Pt returns to clinic for hospital f/u, see course below. Has metastatic lung cancer on chemo admitted for progressing respiratory failure, d/c home with increased supplemental oxygen, now on 3L NC. Pt denies any acute complaints today. Did see oncology yesterday who has put chemo on hold until next restaging imaging with plans to proceed with home hospice care per pt.       Admission Date: 1/28/2023  Hospital Length of Stay: 4 days    HPI:   This is a 61 year old male with a PMHx of SCC of the lung with metastasis to the bone (on maintenance gemcitabine and radiation, s/p chemoradiation and immunotherapy), COPD/bronchiestasis (on 2L O2), Afib (on Eliquis), HTN, HFpEF (EF: 65%), CVA, CAD, PAD who presented with SOB.      Patient reports having worsening shortness of breath, and reports that it was related to the change in weather. He reports having a chronic cough with productive sputum. He denied having chest pain, although in the ER notes that was his presenting complaint. He also had worsening hypoxia at home. The patient is a poor historian. In the ED, he was tachypnic, tachycardic and hypoxic requiring a NRB. Labs showed hypokalemia (3.1), no leukocytosis (4.8), mildly elevated BNP (108), negative troponin (0.013). CXR showed stable examination. CTA showed no PE, stable chronic lung changes, bilateral pleural effusions and pulmonary edema. Lower extremity dopplers were ordered. He was given a Duo-Neb and was admitted for further management.     Hospital Course:   61 y/o male well known to me from previous admits with lung cancer and COPD presents with chest pain.  Also with worsening dyspnea and increasing oxygen requirements at home.  Acute on chronic respiratory failure.  Probably multifactorial from cancer, COPD, pleural effusions.  Started on nebs, steroids, doxy and oxygen with slow improvement.  Patient's O2 was able to be slowly weaned down  "during hospital stay.  Palliative Care followed patient during hospital stay as they have seen him in past.  He has remained afebrile and hemodynamically stable.  Patient doing well on 3L per NC.  This is probably his new baseline O2 requirement.  He will be discharged home to follow up with PCP and Oncology.    Review of Systems   Constitutional:  Positive for fatigue. Negative for activity change, appetite change, fever and unexpected weight change.   HENT:  Negative for nasal congestion, ear pain, postnasal drip, rhinorrhea, sneezing, sore throat and voice change.    Eyes:  Negative for pain and visual disturbance.   Respiratory:  Negative for cough, chest tightness, shortness of breath and wheezing.    Cardiovascular:  Negative for chest pain, palpitations and leg swelling.   Gastrointestinal:  Negative for abdominal pain, change in bowel habit, constipation, diarrhea, nausea, vomiting and change in bowel habit.   Endocrine: Negative.    Genitourinary:  Negative for difficulty urinating.   Musculoskeletal:  Negative for arthralgias, back pain, gait problem and myalgias.   Integumentary:  Negative for rash.   Allergic/Immunologic: Negative.    Neurological:  Negative for speech difficulty and headaches.   Hematological: Negative.    Psychiatric/Behavioral: Negative.     All other systems reviewed and are negative.      Objective:     Vitals:    02/07/23 1402   BP: 136/80   Pulse: 87   Resp: 16   Temp: (!) 87.7 °F (30.9 °C)   TempSrc: Oral   SpO2: (!) 91%   Weight: 84.3 kg (185 lb 13.6 oz)   Height: 5' 10" (1.778 m)     Physical Exam  Vitals and nursing note reviewed.   Constitutional:       General: He is not in acute distress.     Appearance: Normal appearance. He is well-developed, well-groomed and normal weight. He is ill-appearing.      Interventions: Nasal cannula in place.      Comments: 3L O2 NC   HENT:      Head: Normocephalic and atraumatic.      Right Ear: External ear normal.      Left Ear: External " ear normal.      Nose: Nose normal.      Mouth/Throat:      Lips: Pink.      Mouth: Mucous membranes are moist.   Eyes:      General: Lids are normal. Vision grossly intact. Gaze aligned appropriately.      Conjunctiva/sclera: Conjunctivae normal.      Pupils: Pupils are equal, round, and reactive to light.   Neck:      Trachea: Phonation normal.   Cardiovascular:      Rate and Rhythm: Normal rate and regular rhythm.      Heart sounds: Murmur heard.   Pulmonary:      Effort: Pulmonary effort is normal. No accessory muscle usage or respiratory distress.      Breath sounds: Normal air entry. No transmitted upper airway sounds. Examination of the right-lower field reveals rales. Examination of the left-lower field reveals rales. Decreased breath sounds (diffuse with shallow breathing), rhonchi (diffuse) and rales present. No wheezing.   Abdominal:      General: Abdomen is flat. Bowel sounds are normal. There is no distension.      Palpations: Abdomen is soft.      Tenderness: There is no abdominal tenderness.   Musculoskeletal:      Cervical back: Neck supple.      Right lower leg: No edema.      Left lower leg: No edema.   Skin:     General: Skin is cool and dry.      Capillary Refill: Capillary refill takes more than 3 seconds.      Coloration: Skin is cyanotic (peripheral, toes with clubbing) and pale.      Findings: No rash.   Neurological:      General: No focal deficit present.      Mental Status: He is alert and oriented to person, place, and time. Mental status is at baseline.      Sensory: Sensation is intact.      Motor: Motor function is intact.      Coordination: Coordination is intact.      Gait: Gait is intact.   Psychiatric:         Attention and Perception: Attention and perception normal.         Mood and Affect: Mood and affect normal.         Speech: Speech normal.         Behavior: Behavior normal. Behavior is cooperative.         Thought Content: Thought content normal.         Cognition and  Memory: Cognition and memory normal.         Judgment: Judgment normal.     Assessment and Plan:     1. Hospital discharge follow-up  Pt stable without acute distress. Has close oncology follow up. Will continue oncology plan     2. Secondary malignant neoplasm of bone  With progression and plans to move forward with hospice care     3. Immunodeficiency due to drugs  Active palliative chemo therapy managed by oncology            FABIENNE Lawler, FNP-C  Family/Internal Medicine  [facility]

## 2023-02-20 PROBLEM — J96.12 CHRONIC RESPIRATORY FAILURE WITH HYPOXIA AND HYPERCAPNIA: Status: RESOLVED | Noted: 2022-06-16 | Resolved: 2023-01-01

## 2023-02-20 PROBLEM — J96.11 CHRONIC RESPIRATORY FAILURE WITH HYPOXIA AND HYPERCAPNIA: Status: RESOLVED | Noted: 2022-06-16 | Resolved: 2023-01-01

## 2023-02-28 NOTE — ED PROVIDER NOTES
"Encounter Date: 2/28/2023    SCRIBE #1 NOTE: I, Patria Evans, am scribing for, and in the presence of,  Raphael Agee MD. I have scribed the following portions of the note - Other sections scribed: HPI, ROS, PE.     History     Chief Complaint   Patient presents with    low oxygen level     The patient's wife reports that patient was sent here from the Oncology clinic because of Low saturations. Patient was 88% on 6L  in clinic. Patient denies chest pain or sob. Denies fevers. Reports hx of lung cancer.     62 y.o. male with PMHx of Stage IV squamous cell carcinoma of the lung, COPD, CHF (EF 60%), CAD, and others who presents to the ED for chief complaint of low oxygen saturation. Patient was at an appointment with his oncologist when they noted his pulse ox was low. Patient endorses feeling "cold" at that time, but otherwise feels fine at present. Patient additionally endorses mild nasal congestion and myalgias when he felt "cold" at the clinic. Per independent historian, wife, and triage note, patient was 88% on 6L in clinic. Patient denies any cough or other symptoms.    The history is provided by the patient and the spouse. No  was used.   Review of patient's allergies indicates:  No Known Allergies  Past Medical History:   Diagnosis Date    Chronic obstructive pulmonary disease with acute exacerbation 9/5/2022    Combined systolic and diastolic heart failure     Dependence on supplemental oxygen 5/24/2022    History of completed stroke 9/3/2019     09/03/2019 On CT exam    History of non-ST elevation myocardial infarction (NSTEMI) 2/22/2022    Hypertension     Lung cancer     NSCLC    Lung mass     left lung    Macrocytic anemia 8/24/2019    PAD (peripheral artery disease) 3/14/2022    LUIS FELIPE 3/11/22    Peripheral artery disease      Past Surgical History:   Procedure Laterality Date    BRONCHOSCOPY N/A 08/30/2019    Procedure: Bronchoscopy;  Surgeon: Dosc Diagnostic Provider;  Location: [facility] OR " 2ND FLR;  Service: Anesthesiology;  Laterality: N/A;    CORONARY ANGIOGRAPHY N/A 03/04/2022    Procedure: ANGIOGRAM, CORONARY ARTERY;  Surgeon: Robson Green MD;  Location: [facility] CATH LAB;  Service: Cardiology;  Laterality: N/A;    INSERTION OF TUNNELED CENTRAL VENOUS CATHETER (CVC) WITH SUBCUTANEOUS PORT       Family History   Problem Relation Age of Onset    Diabetes Mother     Heart defect Mother     Cancer Father     Cancer Sister     No Known Problems Son      Social History     Tobacco Use    Smoking status: Former     Packs/day: 1.00     Years: 25.00     Pack years: 25.00     Types: Cigarettes     Quit date: 2020     Years since quitting: 3.1    Smokeless tobacco: Never   Substance Use Topics    Alcohol use: Yes     Comment: 11/3/22: Occ.    Drug use: Never     Review of Systems   Constitutional:  Negative for fever.        (+) Felt cold.   HENT:  Positive for congestion.    Respiratory:  Negative for cough and shortness of breath.         (+) Hypoxia.   Gastrointestinal:  Negative for abdominal pain, diarrhea and nausea.   Genitourinary:  Negative for dysuria.   Musculoskeletal:  Positive for myalgias. Negative for back pain.   Skin:  Negative for rash.   Neurological:  Negative for headaches.     Physical Exam     Initial Vitals [02/28/23 1630]   BP Pulse Resp Temp SpO2   128/84 84 20 97.6 °F (36.4 °C) (!) 88 %      MAP       --         Physical Exam    Nursing note and vitals reviewed.  Constitutional: He appears well-developed. He is not diaphoretic. No distress.   HENT:   Head: Normocephalic.   Eyes: EOM are normal.   Cardiovascular:  Regular rhythm.   Tachycardia present.         No murmur heard.  Pulmonary/Chest: Effort normal and breath sounds normal. He has no wheezes.   Lung sounds clear.   Abdominal: Abdomen is soft. He exhibits no distension and no mass. There is no abdominal tenderness.   Musculoskeletal:         General: Normal range of motion.      Comments: No lower extremity edema.      Neurological: He is alert.   Skin: Skin is warm.       ED Course   Procedures  Labs Reviewed   B-TYPE NATRIURETIC PEPTIDE - Abnormal; Notable for the following components:       Result Value     (*)     All other components within normal limits   CBC W/ AUTO DIFFERENTIAL - Abnormal; Notable for the following components:    RDW 15.5 (*)     Mono # 1.1 (*)     Lymph % 12.3 (*)     All other components within normal limits   COMPREHENSIVE METABOLIC PANEL - Abnormal; Notable for the following components:    Sodium 133 (*)     Glucose 126 (*)     All other components within normal limits   TROPONIN I - Abnormal; Notable for the following components:    Troponin I 0.031 (*)     All other components within normal limits   POCT GLUCOSE - Abnormal; Notable for the following components:    POCT Glucose 168 (*)     All other components within normal limits   SARS-COV-2 RDRP GENE   POCT INFLUENZA A/B MOLECULAR   POCT GLUCOSE, HAND-HELD DEVICE        ECG Results              EKG 12-lead (Final result)  Result time 03/01/23 09:14:03      Final result by Interface, Lab In [facility] (03/01/23 09:14:03)                   Narrative:    Test Reason : R09.02,    Vent. Rate : 088 BPM     Atrial Rate : 088 BPM     P-R Int : 186 ms          QRS Dur : 090 ms      QT Int : 380 ms       P-R-T Axes : 070 181 024 degrees     QTc Int : 459 ms    Sinus rhythm with Premature atrial complexes  Right superior axis deviation  Inferior infarct ,age undetermined  Abnormal ECG  When compared with ECG of 28-JAN-2023 14:48,  Significant changes have occurred  Confirmed by Shan Verma MD ([id number]) on 3/1/2023 9:13:51 AM    Referred By: [email or web address]   SELF           Confirmed By:Shan Verma MD                                  Imaging Results              X-Ray Chest AP Portable (Final result)  Result time 02/28/23 17:55:48      Final result by Martha Wild MD (02/28/23 17:55:48)                   Impression:      Findings, as  above.      Electronically signed by: Martha Wild  Date:    02/28/2023  Time:    17:55               Narrative:    EXAMINATION:  XR CHEST AP PORTABLE    CLINICAL HISTORY:  hypoxia;    TECHNIQUE:  Single frontal view of the chest was performed.    COMPARISON:  01/28/2023    FINDINGS:  Right chest wall port.  Small  right and moderate left pleural effusions with underlying airspace disease and/or atelectasis not excluded.  Patchy airspace disease throughout the left lung.  Persistent masslike opacity in the left suprahilar region with adjacent atelectasis or scarring.  Cardiac silhouette is obscured.                                       Medications   albuterol-ipratropium 2.5 mg-0.5 mg/3 mL nebulizer solution 3 mL (3 mLs Nebulization Not Given 3/3/23 1900)   amLODIPine tablet 10 mg (10 mg Oral Not Given 3/3/23 0811)   apixaban tablet 5 mg (5 mg Oral Given 3/3/23 0811)   aspirin chewable tablet 81 mg (81 mg Oral Given 3/3/23 0811)   atorvastatin tablet 80 mg (80 mg Oral Given 3/3/23 0810)   cilostazoL tablet 100 mg (100 mg Oral Given 3/3/23 0810)   clopidogreL tablet 75 mg (75 mg Oral Given 3/3/23 0810)   furosemide tablet 40 mg (40 mg Oral Given 3/3/23 0811)   metoprolol tartrate (LOPRESSOR) tablet 50 mg (50 mg Oral Not Given 3/3/23 1407)   sodium chloride 0.9% flush 10 mL (10 mLs Intravenous Given 3/2/23 2128)   albuterol-ipratropium 2.5 mg-0.5 mg/3 mL nebulizer solution 3 mL (has no administration in time range)   melatonin tablet 6 mg (6 mg Oral Not Given 3/2/23 2044)   ondansetron injection 4 mg (has no administration in time range)   prochlorperazine injection Soln 5 mg (has no administration in time range)   polyethylene glycol packet 17 g (17 g Oral Given 3/3/23 0811)   bisacodyL suppository 10 mg (has no administration in time range)   acetaminophen tablet 650 mg (has no administration in time range)   simethicone chewable tablet 80 mg (has no administration in time range)   aluminum-magnesium  hydroxide-simethicone 200-200-20 mg/5 mL suspension 30 mL (has no administration in time range)   acetaminophen tablet 650 mg (650 mg Oral Not Given 3/2/23 1517)   naloxone 0.4 mg/mL injection 0.02 mg (has no administration in time range)   potassium bicarbonate disintegrating tablet 50 mEq (has no administration in time range)   potassium bicarbonate disintegrating tablet 35 mEq (has no administration in time range)   potassium bicarbonate disintegrating tablet 60 mEq (has no administration in time range)   magnesium oxide tablet 800 mg (has no administration in time range)   magnesium oxide tablet 800 mg (has no administration in time range)   potassium, sodium phosphates 280-160-250 mg packet 2 packet (has no administration in time range)   potassium, sodium phosphates 280-160-250 mg packet 2 packet (has no administration in time range)   potassium, sodium phosphates 280-160-250 mg packet 2 packet (has no administration in time range)   glucose chewable tablet 16 g (has no administration in time range)   glucose chewable tablet 24 g (has no administration in time range)   dextrose 10% bolus 125 mL 125 mL (has no administration in time range)   dextrose 10% bolus 250 mL 250 mL (has no administration in time range)   glucagon (human recombinant) injection 1 mg (has no administration in time range)   insulin aspart U-100 pen 0-5 Units (2 Units Subcutaneous Given 3/3/23 1613)   sodium chloride 0.9% flush 3 mL (has no administration in time range)   predniSONE tablet 40 mg (40 mg Oral Given 3/3/23 0811)   doxycycline tablet 100 mg (100 mg Oral Given 3/3/23 0811)   HYDROcodone-acetaminophen 5-325 mg per tablet 1 tablet (has no administration in time range)   HYDROcodone-acetaminophen  mg per tablet 1 tablet (has no administration in time range)   LIDOcaine 5 % patch 1 patch (1 patch Transdermal Patch Applied 3/3/23 1413)   albuterol sulfate nebulizer solution 5 mg (5 mg Nebulization Given 2/28/23 0853)    ipratropium 0.02 % nebulizer solution 0.5 mg (0.5 mg Nebulization Given 2/28/23 1743)   methylPREDNISolone sodium succinate injection 125 mg (125 mg Intravenous Given 2/28/23 1805)   furosemide injection 80 mg (80 mg Intravenous Given 2/28/23 1834)     Medical Decision Making:   History:   Old Medical Records: I decided to obtain old medical records.  Initial Assessment:   62 year old male presenting with reported hypoxia on SpO2. The patient denies any symptoms. On exam the patient does have clear lung exam and is not in respiratory distress. Suspect pulmonary edema secondary to congestive heart failure. The patient was given diuresis. Plan for admission and diuresis.   Independently Interpreted Test(s):   I have ordered and independently interpreted EKG Reading(s) - see summary below  Clinical Tests:   Lab Tests: Reviewed and Ordered  Radiological Study: Ordered and Reviewed  Medical Tests: Ordered and Reviewed        Scribe Attestation:   Scribe #1: I performed the above scribed service and the documentation accurately describes the services I performed. I attest to the accuracy of the note.      ED Course as of 03/03/23 2216 Tue Feb 28, 2023 1840 EKG 12-lead  Time 4:49 p.m.     Rate 88, sinus, regular rhythm, right axis deviation.   QRS 90 .  No ST elevation or depression.  T-wave inversion V2 Q-wave lead 3, AVF.    Normal sinus rhythm with right axis deviation in inferior Q-waves. [JM]   1841 Transthoracic echocardiogram     Diastolic dysfunction.  EF 60%. [JM]      ED Course User Index  [JM] Raphael Agee MD               I, Raphael Agee, personally performed the services described in this documentation.  All medical record entries made by the scribe were at my direction and in my presence.  I have reviewed the chart and agree that the record reflects my personal performance and is accurate and complete.  Clinical Impression:   Final diagnoses:  [R09.02] Hypoxia  [I50.9] Acute on chronic  heart failure, unspecified heart failure type (Primary)  [R07.9] Chest pain  [J90] Pleural effusion  [C34.90] Squamous cell carcinoma of lung, unspecified laterality  [Z71.89] Advanced care planning/counseling discussion        ED Disposition Condition    Admit Stable                Raphael Agee MD  03/03/23 4019

## 2023-02-28 NOTE — PROGRESS NOTES
"  PATIENT: Kashif Iverson  MRN: [id number]  DATE: 2/28/2023      Diagnosis:   1. Benign essential HTN    2. Malignant neoplasm of unspecified part of unspecified bronchus or lung    3. Encounter to discuss test results    4. Poor appetite    5. Dependence on supplemental oxygen    6. PAD (peripheral artery disease)    7. Follow-up exam    8. Anemia, chronic disease    9. Lung cancer, main bronchus, left    10. Swelling of right upper extremity                    Chief Complaint: Lung Cancer and COPD        Oncologic History:    Oncologic History 1. 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 Palliative chemoradiation with carboplatin and paclitaxel; anaphylactic reaction to paclitaxel  2. Pembrolizumab  3. Pembrolizumab+ramucirumab (LungMAP trial)  4. Gemcitabine    Pathology Squamous cell carcinoma, no actionable mutations, PD-L1 5%        Subjective:    Interval History: Mr. Iverson returns for follow up.     8/30/2019 underwent bronchoscopy that showed "A partially obstructing (about 80% obstructed) mass was found in the middle portion in the left mainstem bronchus. The mass was large and bloody, circumferential, endobronchial, friable and necrotic. 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  No actionable mutations and PD-L1 5%.  9/19/21 staging PET CT showed "Hypermetabolic left lung mass concerning for primary pulmonary malignancy with metastatic disease involving the right 6th and 9th ribs as well as mediastinal and left supraclavicular lymph nodes."  Stage IV squamous cell carcinoma of the lung at diagnosis.    10/8/2019-10/21/2019 he received palliative chemoradiation for rib pain with carboplatin and paclitaxel.  He received 3 cycles of carboplatin and paclitaxel.  He developed anaphylactic reaction to paclitaxel.  The chest was treated with AP:PA fields and the right 9th rib was treated with anterior " and posterior oblique fields at 300 cGy per fraction to 3000 cGy.   Lt chest 6X 300 10 / 10 3,000   Rt 9th rib 6X 300 10 / 10 3,000     10/31/2019-9/10/2020 he received pembrolizumab (completed 15 cycles, last dose 8/21/2020)  9/10/2020 PET CT showed progression of disease.  He was then referred to Dr. Key for evaluation for clinical trials.  10/20/2020 he underwent repeat biopsy for LUNG MAP trial and was randomized to Ramucirumab + pembrolizumab.    11/17/2020 started ramucirumab+pembrolizumab.  Completed 4 cycles and then 2/10/2021 scans showed progression.    2/24/2021 he was subsequent started on gemcitabine with Dr. Cruz.  His care was transferred to Dr. Romo who continued his current regimen and now is currently under  my care after following with Dr. Hathaway.   5/17/22-5/22/22 admitted for acute respiratory failure secondary to heart failure exacerbation requiring intubation.  He was extubated and diuresed successfully.  8/15/22-8/20/22 admitted for acute hypoxic respiratory failure secondary to heart failure; diuresed and discharged with supplemental oxygen        Interval hx:      Pt presented with wife and CT results demonstrated some additional growth of cancer.  Due to pt's increasingly frequent admissions and worsening respiratory status pt and wife informed that no additional tx can be given and hospice is the only option left.  O2 on 4L was noted to be 82 and pt during visit developed increasing SOB.  Pt sent to ED and ED service and palliative service updated on pt tx status.       Past Medical History:   Past Medical History:   Diagnosis Date    Chronic obstructive pulmonary disease with acute exacerbation 9/5/2022    Combined systolic and diastolic heart failure     Dependence on supplemental oxygen 5/24/2022    History of completed stroke 9/3/2019     09/03/2019 On CT exam    History of non-ST elevation myocardial infarction (NSTEMI) 2/22/2022    Hypertension     Lung cancer     NSCLC    Lung  mass     left lung    Macrocytic anemia 8/24/2019    PAD (peripheral artery disease) 3/14/2022    LUIS FELIPE 3/11/22    Peripheral artery disease        Past Surgical HIstory:   Past Surgical History:   Procedure Laterality Date    BRONCHOSCOPY N/A 08/30/2019    Procedure: Bronchoscopy;  Surgeon: Miguel Diagnostic Provider;  Location: [facility] OR [address] [place] [address];  Service: Anesthesiology;  Laterality: N/A;    CORONARY ANGIOGRAPHY N/A 03/04/2022    Procedure: ANGIOGRAM, CORONARY ARTERY;  Surgeon: Robson Green MD;  Location: [facility] CATH LAB;  Service: Cardiology;  Laterality: N/A;    INSERTION OF TUNNELED CENTRAL VENOUS CATHETER (CVC) WITH SUBCUTANEOUS PORT         Family History:   Family History   Problem Relation Age of Onset    Diabetes Mother     Heart defect Mother     Cancer Father     Cancer Sister     No Known Problems Son        Social History:  reports that he quit smoking about 3 years ago. His smoking use included cigarettes. He has a 25.00 pack-year smoking history. He has never used smokeless tobacco. He reports current alcohol use. He reports that he does not use drugs.    Allergies:  Review of patient's allergies indicates:  No Known Allergies    Medications:  No current facility-administered medications for this visit.     Current Outpatient Medications   Medication Sig Dispense Refill    albuterol (VENTOLIN HFA) 90 mcg/actuation inhaler Inhale 2 puffs into the lungs every 6 (six) hours as needed for Wheezing. Rescue 18 g 11    albuterol-ipratropium (DUO-NEB) 2.5 mg-0.5 mg/3 mL nebulizer solution Inhale contents of 1 vial (3 mLs) by nebulization every 4 (four) hours as needed for Wheezing or Shortness of Breath. Rescue 90 mL 1    amLODIPine (NORVASC) 10 MG tablet Take 1 tablet (10 mg total) by mouth once daily. 90 tablet 3    apixaban (ELIQUIS) 5 mg Tab Take 1 tablet (5 mg total) by mouth 2 (two) times daily. 60 tablet 1    ascorbic acid-multivit-min (VITAMIN C ENERGY BOOSTER) 1,000 mg PwEP Take 3,000 mg by mouth once  daily. Patient takes 3,000 mg per day      aspirin 81 MG Chew Take 1 tablet (81 mg total) by mouth once daily. 30 tablet 11    atorvastatin (LIPITOR) 80 MG tablet Take 1 tablet (80 mg total) by mouth once daily. 90 tablet 3    cilostazoL (PLETAL) 100 MG Tab Take 1 tablet (100 mg total) by mouth 2 (two) times daily. 60 tablet 11    fluticasone-salmeterol diskus inhaler 100-50 mcg Inhale 1 puff into the lungs 2 (two) times daily. Controller 60 each 1    metoprolol tartrate (LOPRESSOR) 50 MG tablet Take 1 tablet (50 mg total) by mouth 3 (three) times daily. 90 tablet 11    polyethylene glycol (GLYCOLAX) 17 gram/dose powder Mix 1 capful (17 grams total) with a liquid and take by mouth once daily. 510 g 0    tiotropium (SPIRIVA) 18 mcg inhalation capsule Inhale 1 capsule (18 mcg total) into the lungs via handihaler once daily. Controller 30 capsule 1    furosemide (LASIX) 40 MG tablet Take 1 tablet (40 mg total) by mouth 2 (two) times a day. (Patient not taking: Reported on 2/7/2023) 60 tablet 2     Facility-Administered Medications Ordered in Other Visits   Medication Dose Route Frequency Provider Last Rate Last Admin    heparin, porcine (PF) 100 unit/mL injection flush 500 Units  500 Units Intravenous PRN Sameera Amador MD   500 Units at 01/20/23 1652    sodium chloride 0.9% flush 10 mL  10 mL Intravenous PRN Sameera Amador MD   10 mL at 01/20/23 1652       Review of Systems   Constitutional:  Positive for fatigue. Negative for activity change, appetite change, chills, diaphoresis, fever and unexpected weight change.   HENT:  Negative for nosebleeds and trouble swallowing.    Eyes:  Negative for visual disturbance.   Respiratory:  Positive for shortness of breath and wheezing. Negative for cough and chest tightness.    Cardiovascular:  Negative for chest pain and leg swelling.   Gastrointestinal:  Negative for abdominal distention, abdominal pain, blood in stool, constipation, diarrhea, nausea and vomiting.  "  Endocrine: Negative for cold intolerance and heat intolerance.   Genitourinary:  Negative for difficulty urinating and dysuria.   Musculoskeletal:  Negative for arthralgias, back pain and myalgias.   Skin:  Positive for pallor. Negative for color change.   Neurological:  Negative for dizziness, weakness, light-headedness, numbness and headaches.   Hematological:  Negative for adenopathy. Does not bruise/bleed easily.   Psychiatric/Behavioral:  Negative for confusion.      ECOG Performance Status:     ECOG SCORE    3 - Capable of only limited selfcare, confined to bed or chair more than 50% of waking hours         Objective:      Vitals:   Vitals:    02/28/23 1531   BP: 119/73   Pulse: 83   SpO2: (!) 82%   Weight: 83 kg (182 lb 15.7 oz)   Height: 5' 10" (1.778 m)             BMI: Body mass index is 26.26 kg/m².    Physical Exam  Constitutional:       General: He is not in acute distress.     Appearance: Normal appearance. He is not ill-appearing.   HENT:      Head: Normocephalic and atraumatic.      Nose: Nose normal.      Mouth/Throat:      Pharynx: No oropharyngeal exudate or posterior oropharyngeal erythema.   Eyes:      General: No scleral icterus.     Extraocular Movements: Extraocular movements intact.      Conjunctiva/sclera: Conjunctivae normal.      Pupils: Pupils are equal, round, and reactive to light.   Cardiovascular:      Rate and Rhythm: Normal rate and regular rhythm.      Heart sounds: No murmur heard.    No friction rub. No gallop.      Comments: Right chest wall port c/d/i  Pulmonary:      Effort: Respiratory distress present.      Breath sounds: No stridor. Wheezing present. No rhonchi or rales.   Abdominal:      General: Bowel sounds are normal. There is no distension.      Palpations: Abdomen is soft. There is no mass.      Tenderness: There is no abdominal tenderness. There is no guarding or rebound.   Musculoskeletal:         General: No swelling or tenderness. Normal range of motion.      " Cervical back: Normal range of motion and neck supple.      Right lower leg: No edema.      Left lower leg: No edema.   Skin:     General: Skin is warm and dry.      Findings: No bruising.   Neurological:      General: No focal deficit present.      Mental Status: He is alert.   Psychiatric:         Behavior: Behavior normal.       Laboratory Data:   Recent Results (from the past 168 hour(s))   COMPREHENSIVE METABOLIC PANEL    Collection Time: 02/27/23  2:30 PM   Result Value Ref Range    Sodium 140 136 - 145 mmol/L    Potassium 3.6 3.5 - 5.1 mmol/L    Chloride 103 95 - 110 mmol/L    CO2 25 23 - 29 mmol/L    Glucose 117 (H) 70 - 110 mg/dL    BUN 11 8 - 23 mg/dL    Creatinine 0.9 0.5 - 1.4 mg/dL    Calcium 9.4 8.7 - 10.5 mg/dL    Total Protein 7.4 6.0 - 8.4 g/dL    Albumin 3.6 3.5 - 5.2 g/dL    Total Bilirubin 0.8 0.1 - 1.0 mg/dL    Alkaline Phosphatase 125 55 - 135 U/L    AST 16 10 - 40 U/L    ALT 12 10 - 44 U/L    Anion Gap 12 8 - 16 mmol/L    eGFR >60 >60 mL/min/1.73 m^2   CBC W/ AUTO DIFFERENTIAL    Collection Time: 02/27/23  2:30 PM   Result Value Ref Range    WBC 8.50 3.90 - 12.70 K/uL    RBC 5.05 4.60 - 6.20 M/uL    Hemoglobin 14.1 14.0 - 18.0 g/dL    Hematocrit 43.5 40.0 - 54.0 %    MCV 86 82 - 98 fL    MCH 27.9 27.0 - 31.0 pg    MCHC 32.4 32.0 - 36.0 g/dL    RDW 15.7 (H) 11.5 - 14.5 %    Platelets 288 150 - 450 K/uL    MPV 9.1 (L) 9.2 - 12.9 fL    Immature Granulocytes 0.2 0.0 - 0.5 %    Gran # (ANC) 5.9 1.8 - 7.7 K/uL    Immature Grans (Abs) 0.02 0.00 - 0.04 K/uL    Lymph # 1.2 1.0 - 4.8 K/uL    Mono # 1.2 (H) 0.3 - 1.0 K/uL    Eos # 0.2 0.0 - 0.5 K/uL    Baso # 0.10 0.00 - 0.20 K/uL    nRBC 0 0 /100 WBC    Gran % 68.8 38.0 - 73.0 %    Lymph % 13.9 (L) 18.0 - 48.0 %    Mono % 13.8 4.0 - 15.0 %    Eosinophil % 2.1 0.0 - 8.0 %    Basophil % 1.2 0.0 - 1.9 %    Differential Method Automated    Magnesium    Collection Time: 02/27/23  2:30 PM   Result Value Ref Range    Magnesium 1.7 1.6 - 2.6 mg/dL                  Imaging:       CT Chest Abdomen Pelvis With Contrast (xpd)    Result Date: 8/25/2022  EXAMINATION: CT CHEST ABDOMEN PELVIS WITH CONTRAST (XPD) CLINICAL HISTORY: metastatic lung cancer on gemcitabine, eval response; Malignant neoplasm of left main bronchus TECHNIQUE: Low dose axial images, sagittal and coronal reformations were obtained from the thoracic inlet to the pubic symphysis following the IV administration of 85 mL of Omnipaque 350 and the oral administration of 450 ml of Readi-Cat barium suspension. COMPARISON: 05/26/2022. FINDINGS: There is a right internal jugular Port-A-Cath present with tip located within the right atrium.  There is a left supraclavicular soft tissue density nodule measuring approximately 3.0 x 1.5 cm (image 16, series 2), not significantly changed.  The heart is not enlarged.  There is a moderate amount of atherosclerotic calcification identified within the coronary arteries in a multi-vessel distribution.  Atherosclerotic calcification is also present within the thoracic aorta which is normal in caliber without evidence for aortic dissection or aneurysmal dilatation.  No hilar or mediastinal adenopathy is identified.  No axillary adenopathy is identified.  Once again, there is occlusion of the left upper lobe bronchus with band of soft tissue density within the left upper lobe extending from the hilum much of which represents atelectasis and parenchymal scarring.  There is also a more nodular opacity within the left upper lobe medially measuring 2.1 x 1.5 cm (image 154, series 6) compared to 1.7 x 1.1 cm on the prior examination.  This is worrisome for enlarging malignancy.  There is also a wedge-shaped opacity within the superior left lower lobe which is unchanged and likely represents an area of atelectasis/scarring.  There is a stable 5 mm pulmonary nodule within the left lower lobe laterally (image 312, series 6).  No new nodules are identified.  There are moderate  centrilobular emphysematous changes.  Bony structures appear intact.  There is no evidence for acute fracture or bone destruction. The liver is normal in size and is homogeneous in density with no focal liver lesions identified.  The gallbladder is contracted.  There is no evidence for intrahepatic or extrahepatic biliary dilatation.  The portal venous system is patent.  The spleen and stomach appear unremarkable.  There are a few pancreatic calcifications present which may be related to chronic pancreatitis.  No focal pancreatic lesions are identified.  The adrenal glands are not enlarged.  The kidneys are normal in size and there is no evidence for abnormal renal masses or hydronephrosis.  The kidneys are noted to concentrate contrast symmetrically.  Atherosclerotic calcification is present within the abdominal aorta which demonstrates ectasia without aneurysmal dilatation.  No para-aortic lymphadenopathy is identified.  The appendix is present and appears unremarkable.  No dilated loops of bowel are evident.  The urinary bladder appears grossly unremarkable.  There are multiple prostatic calcifications present. There is no evidence for pelvic or inguinal lymphadenopathy.     Enlarging nodule within the left upper lobe medially now measuring 2.1 x 1.5 cm compared to 1.7 x 1.1 cm on prior examination dated 05/26/2022.  This is worrisome for malignancy. Left supraclavicular soft tissue density nodule measuring 3.0 x 1.5 cm, not significantly changed..  This is an area of prior biopsy proven malignancy. Occlusion of the left upper lobe bronchus with probable atelectasis/scarring within the left upper lobe extending from the hilum, not significantly changed.  There is also a wedge-shaped opacity within the superior left lower lobe which is also unchanged and likely represents an area of atelectasis/scarring. 5mm pulmonary nodule within the left lower lobe laterally, stable dating back to 11/06/2020. Moderate  "centrilobular emphysema. Electronically signed by: Rayshawn Degroot MD Date:    08/25/2022 Time:    13:21    Assessment/Plan:         ECOG 1    Lung cancer, main bronchus, left - Primary (Chronic)  8/30/2019 underwent bronchoscopy that showed "A partially obstructing (about 80% obstructed) mass was found in the middle portion in the left mainstem bronchus. The mass was large and bloody, circumferential, endobronchial, friable and necrotic. 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  No actionable mutations and PD-L1 5%.  9/19/21 staging PET CT showed "Hypermetabolic left lung mass concerning for primary pulmonary malignancy with metastatic disease involving the right 6th and 9th ribs as well as mediastinal and left supraclavicular lymph nodes."  Stage IV squamous cell carcinoma of the lung at diagnosis.     10/8/2019-10/21/2019 he received palliative chemoradiation for rib pain with carboplatin and paclitaxel.  He received 3 cycles of carboplatin and paclitaxel.  He developed anaphylactic reaction to paclitaxel.  The chest was treated with AP:PA fields and the right 9th rib was treated with anterior and posterior oblique fields at 300 cGy per fraction to 3000 cGy.   Lt chest 6X 300 10 / 10 3,000   Rt 9th rib 6X 300 10 / 10 3,000      10/31/2019-9/10/2020 he received pembrolizumab (completed 15 cycles, last dose 8/21/2020)  9/10/2020 PET CT showed progression of disease.  He was then referred to Dr. Key for evaluation for clinical trials.  10/20/2020 he underwent repeat biopsy for LUNG MAP trial and was randomized to Ramucirumab + pembrolizumab.     11/17/2020 started ramucirumab+pembrolizumab.  Completed 4 cycles and then 2/10/2021 scans showed progression.     2/24/2021 he was subsequent started on gemcitabine with Dr. Cruz.    -Treated in 9/22 with XRT for OPAL lesion        A/P 2/28/23  -restaging CT demonstrates slight worsening of " "cancer and lymphadenopathy and reviewed with pt and wife  -Due to worsening respiratory/performance status with growth of cancer pt and wife informed that no additional tx options are left for him  -Pt sent to ED for worsening respiratory distress with palliative follow up        Chronic combined systolic and diastolic heart failure (Chronic)/Chronic hypoxia  -Contributor to many hospitalizations      Left shoulder pain  -chronic  -No signs of cancer of fracture on Xray  -Trial of tramadol and referral to pain clinic placed on prior visit      Time spent with pt: 45 minutes      Problem List Items Addressed This Visit          Pulmonary    Dependence on supplemental oxygen       Cardiac/Vascular    PAD (peripheral artery disease)    Overview     LUIS FELIEP 3/11/22            Oncology    Lung cancer, main bronchus, left (Chronic)    Overview     8/30/2019 underwent bronchoscopy that showed "A partially obstructing (about 80% obstructed) mass was found in the middle portion in the left mainstem bronchus. The mass was large and bloody, circumferential, endobronchial, friable and necrotic. 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  No actionable mutations and PD-L1 5%.  9/19/21 staging PET CT showed "Hypermetabolic left lung mass concerning for primary pulmonary malignancy with metastatic disease involving the right 6th and 9th ribs as well as mediastinal and left supraclavicular lymph nodes."  Stage IV squamous cell carcinoma of the lung at diagnosis.    10/8/2019-10/21/2019 he received palliative chemoradiation for rib pain with carboplatin and paclitaxel.  He received 3 cycles of carboplatin and paclitaxel.  He developed anaphylactic reaction to paclitaxel.  The chest was treated with AP:PA fields and the right 9th rib was treated with anterior and posterior oblique fields at 300 cGy per fraction to 3000 cGy.   Lt chest 6X 300 10 / 10 3,000   Rt " 9th rib 6X 300 10 / 10 3,000     10/31/2019-9/10/2020 he received pembrolizumab (completed 15 cycles, last dose 8/21/2020)  9/10/2020 PET CT showed progression of disease.  He was then referred to Dr. Key for evaluation for clinical trials.  10/20/2020 he underwent repeat biopsy for LUNG MAP trial and was randomized to Ramucirumab + pembrolizumab.    11/17/2020 started ramucirumab+pembrolizumab.  Completed 4 cycles and then 2/10/2021 scans showed progression.    2/24/2021 he was subsequent started on gemcitabine with Dr. Cruz.          Other Visit Diagnoses       Benign essential HTN    -  Primary    Malignant neoplasm of unspecified part of unspecified bronchus or lung        Encounter to discuss test results        Poor appetite        Follow-up exam        Anemia, chronic disease        Swelling of right upper extremity                    No orders of the defined types were placed in this encounter.                Guerline Higgins MD  Hematology Oncology

## 2023-03-01 NOTE — ASSESSMENT & PLAN NOTE
-CXR reviewed in personally interpreted.  Appears to have bilateral pleural effusion, left greater than right  -Consider pulmonary consult for thoracentesis if hypoxia is not improving

## 2023-03-01 NOTE — HOSPITAL COURSE
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  Presented to oncology clinic hypoxic with O2 sats of 88% on 6 L. required Venti mask initially for improvement.    Labs overall unremarkable.  Chest x-ray w/ Small  right and moderate left pleural effusions with underlying airspace disease and/or atelectasis not excluded.  Patchy airspace disease throughout the left lung.  Persistent masslike opacity in the left suprahilar region with adjacent atelectasis or scarring. Was given Lasix and Solu-Medrol in the ED and started on COPD pathway. Palliative care team consulted. Patient and wife not ready for hospice at this time, but agreeable to have NP from palliative to visit home on discharge. Able to wean O2 down to 3L NC. This may be patient's new baseline.    He states he feels good today and a lot better compared to presentation on admission.  Other requiring supplemental oxygen, patient denies feeling any shortness of breath.  Has a dry cough, but denies any difficulty breathing.  Admits lower back pain that has been chronic, near baseline. Pt denies any fever, headaches, vision changes, chest pain, shortness of breath, palpitations, abdominal pain, nausea, vomiting, or any new weaknesses. Feels ready to go home. Patient's exam on discharge was as follow: Patient is alert and oriented, appears in no acute distress but is chronically ill appearing, heart with regular rate and rhythm, lungs with ronchi, but no wheezing, abdomen soft and nontender, and no new weaknesses or focal deficits seen. Bilateral lower extremities without any edema or calf tenderness.     Patient was counseled regarding any abnormal labs, differential diagnosis, treatment options, risk-benefit, lifestyle changes, prognosis, current  condition, and medications. Patient was interactive and attentive.  Patient's questions were answered in a respectful and timely manner. Patient was instructed to follow-up with PCP within 1 week and to continue taking medications as prescribed.  Instructed to also follow up with oncologist as well. Referral for palliative NP to visit home placed. Also, extensively discussed the risks, benefits, and side effects of patient's medications. Discussed with patient about any medication changes. Patient verbalized understanding and agrees to treatment plan.  Patient is stable for discharge.  Patient has no other questions or concerns at this time.  ED precautions discussed with the patient. Also called and spoke with the patient's spouse and updated her on plan of care    Vital signs are stable. Ambulating without any difficulty. Tolerating p.o. intake without any nausea or vomiting. Afebrile for over 24 hours. Patient is in stable condition and has no questions or concerns. Patient will be discharge to home once transportation secured . Prescriptions sent to pharmacy.  CM/SW to assist with discharge planning.     Vitals:    03/03/23 0703 03/03/23 0846 03/03/23 1050 03/03/23 1258   BP:   (!) 147/70    BP Location:       Patient Position:   Lying    Pulse:  92 104 99   Resp:  16 18 16   Temp:   97.6 °F (36.4 °C)    TempSrc:   Oral    SpO2: (!) 92% 98% (!) 91% 98%   Weight:       Height:

## 2023-03-01 NOTE — SUBJECTIVE & OBJECTIVE
Interval History:  No acute overnight events.  Patient remained afebrile.  Currently on 6L HFNC on my exam.  However, patient denies feeling any shortness of breath.  Admits mildly productive cough, but states the sputum appears clear.  Denies any chest pain or difficulty breathing.  Feeling much better today than yesterday    Review of Systems   Constitutional:  Negative for chills, fatigue and fever.   HENT:  Negative for congestion.    Respiratory:  Positive for cough. Negative for chest tightness, shortness of breath and wheezing.    Cardiovascular:  Negative for chest pain, palpitations and leg swelling.   Gastrointestinal:  Negative for abdominal pain, nausea and vomiting.   Genitourinary:  Negative for difficulty urinating.   Musculoskeletal:  Negative for joint swelling.   Neurological:  Negative for dizziness, weakness and headaches.   Psychiatric/Behavioral:  Negative for confusion and hallucinations.      Objective:     Vital Signs (Most Recent):  Temp: 97.8 °F (36.6 °C) (03/01/23 1100)  Pulse: 93 (03/01/23 1100)  Resp: 20 (03/01/23 1100)  BP: (!) 100/56 (03/01/23 1100)  SpO2: (!) 94 % (03/01/23 1100)   Vital Signs (24h Range):  Temp:  [97.6 °F (36.4 °C)-98.8 °F (37.1 °C)] 97.8 °F (36.6 °C)  Pulse:  [] 93  Resp:  [18-38] 20  SpO2:  [79 %-98 %] 94 %  BP: (100-142)/(55-86) 100/56     Weight: 81.5 kg (179 lb 10.8 oz)  Body mass index is 25.78 kg/m².    Intake/Output Summary (Last 24 hours) at 3/1/2023 1134  Last data filed at 3/1/2023 0953  Gross per 24 hour   Intake 240 ml   Output 1200 ml   Net -960 ml      Physical Exam  Vitals and nursing note reviewed.   Constitutional:       General: He is not in acute distress.     Appearance: He is ill-appearing (chronically ill appearing).   HENT:      Head: Normocephalic and atraumatic.      Nose: Nose normal.      Mouth/Throat:      Mouth: Mucous membranes are moist.   Eyes:      Extraocular Movements: Extraocular movements intact.   Cardiovascular:       Rate and Rhythm: Normal rate and regular rhythm.      Pulses: Normal pulses.      Heart sounds: No murmur heard.  Pulmonary:      Effort: Pulmonary effort is normal. No respiratory distress.      Breath sounds: Wheezing (With forced expiration) and rhonchi present.   Abdominal:      General: Abdomen is flat. There is no distension.      Palpations: Abdomen is soft.      Tenderness: There is no abdominal tenderness.   Musculoskeletal:      Right lower leg: No edema.      Left lower leg: No edema.   Skin:     General: Skin is warm and dry.      Capillary Refill: Capillary refill takes less than 2 seconds.   Neurological:      General: No focal deficit present.      Mental Status: He is alert and oriented to person, place, and time.   Psychiatric:         Mood and Affect: Mood normal.       Significant Labs: All pertinent labs within the past 24 hours have been reviewed.    Significant Imaging: I have reviewed all pertinent imaging results/findings within the past 24 hours.

## 2023-03-01 NOTE — H&P
[facility] [place] Emergency Dept  [facility] Medicine  History & Physical    Patient Name: Kashif Iverson  MRN: [id number]  Patient Class: IP- Inpatient  Admission Date: 2/28/2023  Attending Physician: Kayla Coleman DO   Primary Care Provider: Eduardo Ruiz MD         Patient information was obtained from patient and ER records.     Subjective:     Principal Problem:Acute hypoxemic respiratory failure    Chief Complaint:   Chief Complaint   Patient presents with    low oxygen level     The patient's wife reports that patient was sent here from the Oncology clinic because of Low saturations. Patient was 88% on 6L  in clinic. Patient denies chest pain or sob. Denies fevers. Reports hx of lung cancer.        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     Patient was sent from oncology clinic to the ED due to hypoxia.  Patient denies having any complaints and reports feeling fine.  He has multiple prior hospitalizations for similar presentation, related to COPD exacerbation/volume overload. In the ED, the patient was tachypneic, hypoxic requiring a venturi mask (50%, 15 L/min).  Left showed an elevated BNP (364), elevated troponin (0.031).  Chest x-ray showed small right and moderate left pleural effusions and patchy airspace disease throughout the left lung.  The patient was given Lasix 80 mg IV.  Solu-Medrol 125 mg IV, and DuoNeb x1.  He was admitted for further management.      Past Medical History:   Diagnosis Date    Chronic obstructive pulmonary disease with acute exacerbation 9/5/2022    Combined systolic and diastolic heart failure     Dependence on supplemental oxygen 5/24/2022    History of completed stroke 9/3/2019     09/03/2019 On CT exam    History of non-ST elevation  myocardial infarction (NSTEMI) 2/22/2022    Hypertension     Lung cancer     NSCLC    Lung mass     left lung    Macrocytic anemia 8/24/2019    PAD (peripheral artery disease) 3/14/2022    LUIS FELIPE 3/11/22    Peripheral artery disease        Past Surgical History:   Procedure Laterality Date    BRONCHOSCOPY N/A 08/30/2019    Procedure: Bronchoscopy;  Surgeon: Miguel Diagnostic Provider;  [facility]: [facility] OR [address] [place] [address];  Service: Anesthesiology;  Laterality: N/A;    CORONARY ANGIOGRAPHY N/A 03/04/2022    Procedure: ANGIOGRAM, CORONARY ARTERY;  Surgeon: Robson Green MD;  Location: [facility] CATH LAB;  Service: Cardiology;  Laterality: N/A;    INSERTION OF TUNNELED CENTRAL VENOUS CATHETER (CVC) WITH SUBCUTANEOUS PORT         Review of patient's allergies indicates:  No Known Allergies    Current Facility-Administered Medications on File Prior to Encounter   Medication    heparin, porcine (PF) 100 unit/mL injection flush 500 Units    sodium chloride 0.9% flush 10 mL     Current Outpatient Medications on File Prior to Encounter   Medication Sig    albuterol (VENTOLIN HFA) 90 mcg/actuation inhaler Inhale 2 puffs into the lungs every 6 (six) hours as needed for Wheezing. Rescue    albuterol-ipratropium (DUO-NEB) 2.5 mg-0.5 mg/3 mL nebulizer solution Inhale contents of 1 vial (3 mLs) by nebulization every 4 (four) hours as needed for Wheezing or Shortness of Breath. Rescue    amLODIPine (NORVASC) 10 MG tablet Take 1 tablet (10 mg total) by mouth once daily.    apixaban (ELIQUIS) 5 mg Tab Take 1 tablet (5 mg total) by mouth 2 (two) times daily.    ascorbic acid-multivit-min (VITAMIN C ENERGY BOOSTER) 1,000 mg PwEP Take 3,000 mg by mouth once daily. Patient takes 3,000 mg per day    aspirin 81 MG Chew Take 1 tablet (81 mg total) by mouth once daily.    atorvastatin (LIPITOR) 80 MG tablet Take 1 tablet (80 mg total) by mouth once daily.    cilostazoL (PLETAL) 100 MG Tab Take 1 tablet (100 mg total) by mouth 2 (two) times  daily.    fluticasone-salmeterol diskus inhaler 100-50 mcg Inhale 1 puff into the lungs 2 (two) times daily. Controller    furosemide (LASIX) 40 MG tablet Take 1 tablet (40 mg total) by mouth 2 (two) times a day. (Patient not taking: Reported on 2/7/2023)    metoprolol tartrate (LOPRESSOR) 50 MG tablet Take 1 tablet (50 mg total) by mouth 3 (three) times daily.    polyethylene glycol (GLYCOLAX) 17 gram/dose powder Mix 1 capful (17 grams total) with a liquid and take by mouth once daily.    tiotropium (SPIRIVA) 18 mcg inhalation capsule Inhale 1 capsule (18 mcg total) into the lungs via handihaler once daily. Controller     Family History       Problem Relation (Age of Onset)    Cancer Father, Sister    Diabetes Mother    Heart defect Mother    No Known Problems Son          Tobacco Use    Smoking status: Former     Packs/day: 1.00     Years: 25.00     Pack years: 25.00     Types: Cigarettes     Quit date: 2020     Years since quitting: 3.1    Smokeless tobacco: Never   Substance and Sexual Activity    Alcohol use: Yes     Comment: 11/3/22: Occ.    Drug use: Never    Sexual activity: Yes     Partners: Female     Review of Systems   Constitutional: Negative.    HENT: Negative.     Eyes: Negative.    Respiratory: Negative.     Cardiovascular: Negative.    Gastrointestinal: Negative.    Endocrine: Negative.    Genitourinary: Negative.    Musculoskeletal: Negative.    Skin: Negative.    Allergic/Immunologic: Negative.    Neurological: Negative.    Psychiatric/Behavioral: Negative.     Objective:     Vital Signs (Most Recent):  Temp: 97.9 °F (36.6 °C) (02/28/23 1916)  Pulse: 109 (02/28/23 2003)  Resp: (!) 25 (02/28/23 1932)  BP: 135/83 (02/28/23 2003)  SpO2: (!) 90 % (02/28/23 2003)   Vital Signs (24h Range):  Temp:  [97.6 °F (36.4 °C)-97.9 °F (36.6 °C)] 97.9 °F (36.6 °C)  Pulse:  [] 109  Resp:  [20-38] 25  SpO2:  [82 %-98 %] 90 %  BP: (119-142)/(73-85) 135/83     Weight: 85.3 kg (188 lb)  Body mass index  is 26.98 kg/m².    Physical Exam  Vitals and nursing note reviewed.   Constitutional:       General: He is not in acute distress.     Appearance: He is ill-appearing.   HENT:      Head: Normocephalic and atraumatic.      Nose: Nose normal.      Mouth/Throat:      Mouth: Mucous membranes are moist.   Eyes:      Extraocular Movements: Extraocular movements intact.   Cardiovascular:      Rate and Rhythm: Normal rate.      Pulses: Normal pulses.      Heart sounds: No murmur heard.  Pulmonary:      Effort: Pulmonary effort is normal. No respiratory distress.      Breath sounds: Wheezing (With forced expiration) and rhonchi present.   Abdominal:      General: Abdomen is flat.      Palpations: Abdomen is soft.      Tenderness: There is no abdominal tenderness.   Musculoskeletal:      Right lower leg: No edema.      Left lower leg: No edema.   Skin:     General: Skin is warm.      Capillary Refill: Capillary refill takes less than 2 seconds.   Neurological:      General: No focal deficit present.      Mental Status: He is alert.   Psychiatric:         Mood and Affect: Mood normal.           Significant Labs: All pertinent labs within the past 24 hours have been reviewed.    Significant Imaging: I have reviewed all pertinent imaging results/findings within the past 24 hours.    Assessment/Plan:     * Acute hypoxemic respiratory failure  Patient with Hypoxic Respiratory failure which is Acute on chronic.  he is on home oxygen at 2 LPM. Supplemental oxygen was provided and noted- Oxygen Concentration (%):  [50] 50.   Signs/symptoms of respiratory failure include- increased work of breathing and respiratory distress. Contributing diagnoses includes - COPD Labs and images were reviewed. Patient Has not had a recent ABG. Will treat underlying causes and adjust management of respiratory failure as follows- See plan for COPD    Pleural effusion  Consider pulmonary consult for thoracentesis if hypoxia is not  improving      Persistent atrial fibrillation  [id number] Score: 3.   Continue metoprolol and Eliquis    COPD exacerbation  - History of COPD on supplemental O2  - Presented with worsening hypoxia  - Likely in exacerbation    Plan:   - Continue steroids.  - Duo-Nebs scheduled  - Doxycyline     Coronary artery disease involving native coronary artery of native heart without angina pectoris  Resume home medications      PAD (peripheral artery disease)  Continue home medications      Chronic diastolic heart failure  Doubt exacerbation   Results for orders placed during the hospital encounter of 09/28/22    Echo    Interpretation Summary  · The left ventricle is normal in size with concentric hypertrophy and normal systolic function.  · The estimated ejection fraction is 60%.  · Normal left ventricular diastolic function.  · Mild right ventricular enlargement with mildly reduced right ventricular systolic function.  · Mild left atrial enlargement.  · Mild right atrial enlargement.  · Normal central venous pressure (3 mmHg).  · The estimated PA systolic pressure is 40 mmHg.  · Trivial posterior pericardial effusion.    Resume home lasix       Essential hypertension  Resume home medications      Lung cancer, main bronchus, left  Outpatient follow-up with Oncology        VTE Risk Mitigation (From admission, onward)         Ordered     apixaban tablet 5 mg  2 times daily         02/28/23 2017     IP VTE HIGH RISK PATIENT  Once         02/28/23 2017     Place sequential compression device  Until discontinued         02/28/23 2017                   David Marroquin MD  Department of Hospital Medicine   [facility] - Emergency Dept

## 2023-03-01 NOTE — SUBJECTIVE & OBJECTIVE
Past Medical History:   Diagnosis Date    Chronic obstructive pulmonary disease with acute exacerbation 9/5/2022    Combined systolic and diastolic heart failure     Dependence on supplemental oxygen 5/24/2022    History of completed stroke 9/3/2019     09/03/2019 On CT exam    History of non-ST elevation myocardial infarction (NSTEMI) 2/22/2022    Hypertension     Lung cancer     NSCLC    Lung mass     left lung    Macrocytic anemia 8/24/2019    PAD (peripheral artery disease) 3/14/2022    LUIS FELIPE 3/11/22    Peripheral artery disease        Past Surgical History:   Procedure Laterality Date    BRONCHOSCOPY N/A 08/30/2019    Procedure: Bronchoscopy;  Surgeon: [facility] Diagnostic Provider;  Location: [facility] OR [address] [place] [address];  Service: Anesthesiology;  Laterality: N/A;    CORONARY ANGIOGRAPHY N/A 03/04/2022    Procedure: ANGIOGRAM, CORONARY ARTERY;  Surgeon: Robson Green MD;  Location: [facility] CATH LAB;  Service: Cardiology;  Laterality: N/A;    INSERTION OF TUNNELED CENTRAL VENOUS CATHETER (CVC) WITH SUBCUTANEOUS PORT         Review of patient's allergies indicates:  No Known Allergies    Medications:  Continuous Infusions:  Scheduled Meds:   albuterol-ipratropium  3 mL Nebulization Q4H    amLODIPine  10 mg Oral Daily    apixaban  5 mg Oral BID    aspirin  81 mg Oral Daily    atorvastatin  80 mg Oral Daily    cilostazoL  100 mg Oral BID    clopidogreL  75 mg Oral Daily    doxycycline  100 mg Oral Q12H    furosemide  40 mg Oral BID    metoprolol tartrate  50 mg Oral TID    polyethylene glycol  17 g Oral Daily    predniSONE  40 mg Oral Daily     PRN Meds:acetaminophen, acetaminophen, albuterol-ipratropium, aluminum-magnesium hydroxide-simethicone, bisacodyL, dextrose 10%, dextrose 10%, glucagon (human recombinant), glucose, glucose, insulin aspart U-100, magnesium oxide, magnesium oxide, melatonin, naloxone, ondansetron, potassium bicarbonate, potassium bicarbonate, potassium bicarbonate, potassium, sodium phosphates, potassium,  sodium phosphates, potassium, sodium phosphates, prochlorperazine, simethicone, sodium chloride 0.9%    Family History       Problem Relation (Age of Onset)    Cancer Father, Sister    Diabetes Mother    Heart defect Mother    No Known Problems Son          Tobacco Use    Smoking status: Former     Packs/day: 1.00     Years: 25.00     Pack years: 25.00     Types: Cigarettes     Quit date: 2020     Years since quitting: 3.1    Smokeless tobacco: Never   Substance and Sexual Activity    Alcohol use: Yes     Comment: 11/3/22: Occ.    Drug use: Never    Sexual activity: Yes     Partners: Female       Review of Systems   Constitutional:  Negative for activity change and appetite change.   HENT:  Negative for congestion.    Respiratory:  Negative for shortness of breath.    Cardiovascular:  Negative for chest pain.   Gastrointestinal: Negative.    Genitourinary: Negative.    Musculoskeletal: Negative.    Objective:     Vital Signs (Most Recent):  Temp: 98.2 °F (36.8 °C) (03/01/23 0652)  Pulse: 100 (03/01/23 0745)  Resp: 20 (03/01/23 0745)  BP: 123/80 (03/01/23 0813)  SpO2: 96 % (03/01/23 0745) Vital Signs (24h Range):  Temp:  [97.6 °F (36.4 °C)-98.8 °F (37.1 °C)] 98.2 °F (36.8 °C)  Pulse:  [] 100  Resp:  [18-38] 20  SpO2:  [79 %-98 %] 96 %  BP: (106-142)/(55-86) 123/80     Weight: 81.5 kg (179 lb 10.8 oz)  Body mass index is 25.78 kg/m².    Physical Exam  Vitals and nursing note reviewed.   Constitutional:       General: He is not in acute distress.     Appearance: He is ill-appearing. He is not toxic-appearing or diaphoretic.   HENT:      Head: Normocephalic and atraumatic.      Right Ear: External ear normal.      Left Ear: External ear normal.      Nose: Nose normal.      Mouth/Throat:      Mouth: Mucous membranes are moist.   Eyes:      General:         Right eye: No discharge.         Left eye: No discharge.   Cardiovascular:      Rate and Rhythm: Tachycardia present.   Pulmonary:      Breath sounds: Wheezing  present.      Comments: Increased work of breathing, oxygen 6 liters  Abdominal:      Palpations: Abdomen is soft.   Musculoskeletal:      Right lower leg: No edema.      Left lower leg: No edema.   Skin:     General: Skin is warm and dry.   Neurological:      Mental Status: He is alert and oriented to person, place, and time.   Psychiatric:         Attention and Perception: He is inattentive.         Mood and Affect: Affect is flat.         Speech: Speech is delayed.           Advance Care Planning   Advance Directives:   Goals of Care: What is most important right now is to focus on spending time at home, avoiding the hospital, remaining as independent as possible, symptom/pain control, quality of life, even if it means sacrificing a little time. Accordingly, we have decided that the best plan to meet the patient's goals includes enrolling in hospice care.       Significant Labs: All pertinent labs within the past 24 hours have been reviewed.  CBC:   Recent Labs   Lab 03/01/23  0536   WBC 6.36   HGB 14.1   HCT 42.8   MCV 86        BMP:  Recent Labs   Lab 03/01/23  0536   *      K 3.8      CO2 23   BUN 14   CREATININE 0.9   CALCIUM 9.3   MG 1.7     LFT:  Lab Results   Component Value Date    AST 12 03/01/2023    ALKPHOS 118 03/01/2023    BILITOT 0.6 03/01/2023     Albumin:   Albumin   Date Value Ref Range Status   03/01/2023 3.3 (L) 3.5 - 5.2 g/dL Final     Protein:   Total Protein   Date Value Ref Range Status   03/01/2023 6.8 6.0 - 8.4 g/dL Final     Lactic acid:   Lab Results   Component Value Date    LACTATE 1.9 12/16/2022    LACTATE 2.3 (H) 12/16/2022       Significant Imaging: I have reviewed all pertinent imaging results/findings within the past 24 hours. CXR

## 2023-03-01 NOTE — CONSULTS
"[facility] Surg  Palliative Medicine  Consult Note    Patient Name: Kashif Iverson  MRN: [id number]  Admission Date: 2/28/2023  Hospital Length of Stay: 1 days  Code Status: Full Code   Attending Provider: Kayla Coleman DO  Consulting Provider: Eli Arango NP  Primary Care Physician: Eduardo Ruiz MD  Principal Problem:Acute hypoxemic respiratory failure    Patient information was obtained from patient, past medical records, ER records and primary team.      Inpatient consult to Palliative Care  Consult performed by: Eli Arango NP  Consult ordered by: Kayla Coleman DO  Reason for consult: hospice/GOC      Assessment/Plan:   Palliative encounter:  Advance Care Planning     -Palliative consult received. Patient known to me.   -Chart reviewed  -Discussion with his oncologist who saw him yesterday in the office; despite maintenance tx cancer has grown, no other tx options, hospice as an option was discussed and spouse open to meet with hospice; patient sent to ED from oncology office due to low oxygen sats  -at time of consult patient alert and oriented but very flat in conversation and with delayed speech. He is on 6 liters oxygen which is up from his normal 3 liters at home.  He denies feeling SOB and states he feels fine.  -we discussed his appt with his oncologist and he did not remember being told there are no more tx options. He states" so there's no hope then?"  I explained the goal was not ever curative but only to prolong life and to try and stop the growth which it has done for some time now, but per last imaging it had changed. He just looked at me as though he was pondering the information.   -We then discussed his advanced COPD and the reason he continues to come in the hospital and that he will likely die from COPD before he dies of the cancer. He asked could we do anything about the COPD. I explained we can manage symptoms and this could be done at home with hospice as we had " discussed last hospitalization.   Noted patient was being seen at home by [facility] NP and after last d/c they were asked to discuss bridge to hospice, but per the [facility] NP they were unable to reach patient. He is now in hospital again.  Patient also reports much anxiety and that sometimes he can just go outside and he gets a panic attack. This then increases in dyspnea. We discussed this as a symptom that can also be managed by hospice. He states he is ok with whatever his wife wants.   -addressed all questions   -emotional support provided   -asked CM to set up hospice informational session with spouse.  -updated Dr Coleman [facility] medicine    Acute hypoxemic respiratory failure  Patient with Hypoxic Respiratory failure which is Acute on chronic.   COPD exacerbation   -Mgmt per primary    COPD exacerbation  - History of COPD on supplemental O2  - Presented with worsening hypoxia       -meets hospice criteria for COPD and will be meeting with hospice      Chronic diastolic heart failure  Doubt exacerbation   Results for orders placed during the hospital encounter of 09/28/22     Echo     Interpretation Summary  · The left ventricle is normal in size with concentric hypertrophy and normal systolic function.  · The estimated ejection fraction is 60%.  · Normal left ventricular diastolic function.  · Mild right ventricular enlargement with mildly reduced right ventricular systolic function.  · Mild left atrial enlargement.  · Mild right atrial enlargement.  · Normal central venous pressure (3 mmHg).  · The estimated PA systolic pressure is 40 mmHg.  · Trivial posterior pericardial effusion.       Lung cancer, main bronchus, left  -patient has been on maintenance chemo  -followed by Dr Higgins oncologist outpatient  No more tx options, imaging shows enlargement       Pleural effusion  Mgmt per primary      Persistent atrial fibrillation    noted    Coronary artery disease involving native  coronary artery of native heart without angina pectoris        PAD (peripheral artery disease)       Essential hypertension         Thank you for your consult.     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      Patient was sent from oncology clinic to the ED due to hypoxia.  Patient denies having any complaints and reports feeling fine.  He has multiple prior hospitalizations for similar presentation, related to COPD exacerbation/volume overload. In the ED, the patient was tachypneic, hypoxic requiring a venturi mask (50%, 15 L/min).  Left showed an elevated BNP (364), elevated troponin (0.031).  Chest x-ray showed small right and moderate left pleural effusions and patchy airspace disease throughout the left lung.  The patient was given Lasix 80 mg IV.  Solu-Medrol 125 mg IV, and DuoNeb x1.  He was admitted for further management.         Hospital Course:  No notes on file        Past Medical History:   Diagnosis Date    Chronic obstructive pulmonary disease with acute exacerbation 9/5/2022    Combined systolic and diastolic heart failure     Dependence on supplemental oxygen 5/24/2022    History of completed stroke 9/3/2019     09/03/2019 On CT exam    History of non-ST elevation myocardial infarction (NSTEMI) 2/22/2022    Hypertension     Lung cancer     NSCLC    Lung mass     left lung    Macrocytic anemia 8/24/2019    PAD (peripheral artery disease) 3/14/2022    LUIS FELIPE 3/11/22    Peripheral artery disease        Past Surgical History:   Procedure Laterality Date    BRONCHOSCOPY N/A 08/30/2019    Procedure: Bronchoscopy;  Surgeon: Miguel Diagnostic Provider;  Location: [facility] OR [address] [place] [address];  Service: Anesthesiology;  Laterality: N/A;    CORONARY ANGIOGRAPHY N/A  03/04/2022    Procedure: ANGIOGRAM, CORONARY ARTERY;  Surgeon: Robson Green MD;  Location: [facility] CATH LAB;  Service: Cardiology;  Laterality: N/A;    INSERTION OF TUNNELED CENTRAL VENOUS CATHETER (CVC) WITH SUBCUTANEOUS PORT         Review of patient's allergies indicates:  No Known Allergies    Medications:  Continuous Infusions:  Scheduled Meds:   albuterol-ipratropium  3 mL Nebulization Q4H    amLODIPine  10 mg Oral Daily    apixaban  5 mg Oral BID    aspirin  81 mg Oral Daily    atorvastatin  80 mg Oral Daily    cilostazoL  100 mg Oral BID    clopidogreL  75 mg Oral Daily    doxycycline  100 mg Oral Q12H    furosemide  40 mg Oral BID    metoprolol tartrate  50 mg Oral TID    polyethylene glycol  17 g Oral Daily    predniSONE  40 mg Oral Daily     PRN Meds:acetaminophen, acetaminophen, albuterol-ipratropium, aluminum-magnesium hydroxide-simethicone, bisacodyL, dextrose 10%, dextrose 10%, glucagon (human recombinant), glucose, glucose, insulin aspart U-100, magnesium oxide, magnesium oxide, melatonin, naloxone, ondansetron, potassium bicarbonate, potassium bicarbonate, potassium bicarbonate, potassium, sodium phosphates, potassium, sodium phosphates, potassium, sodium phosphates, prochlorperazine, simethicone, sodium chloride 0.9%    Family History       Problem Relation (Age of Onset)    Cancer Father, Sister    Diabetes Mother    Heart defect Mother    No Known Problems Son          Tobacco Use    Smoking status: Former     Packs/day: 1.00     Years: 25.00     Pack years: 25.00     Types: Cigarettes     Quit date: 2020     Years since quitting: 3.1    Smokeless tobacco: Never   Substance and Sexual Activity    Alcohol use: Yes     Comment: 11/3/22: Occ.    Drug use: Never    Sexual activity: Yes     Partners: Female       Review of Systems   Constitutional:  Negative for activity change and appetite change.   HENT:  Negative for congestion.    Respiratory:  Negative for shortness of breath.    Cardiovascular:   Negative for chest pain.   Gastrointestinal: Negative.    Genitourinary: Negative.    Musculoskeletal: Negative.    Objective:     Vital Signs (Most Recent):  Temp: 98.2 °F (36.8 °C) (03/01/23 0652)  Pulse: 100 (03/01/23 0745)  Resp: 20 (03/01/23 0745)  BP: 123/80 (03/01/23 0813)  SpO2: 96 % (03/01/23 0745) Vital Signs (24h Range):  Temp:  [97.6 °F (36.4 °C)-98.8 °F (37.1 °C)] 98.2 °F (36.8 °C)  Pulse:  [] 100  Resp:  [18-38] 20  SpO2:  [79 %-98 %] 96 %  BP: (106-142)/(55-86) 123/80     Weight: 81.5 kg (179 lb 10.8 oz)  Body mass index is 25.78 kg/m².    Physical Exam  Vitals and nursing note reviewed.   Constitutional:       General: He is not in acute distress.     Appearance: He is ill-appearing. He is not toxic-appearing or diaphoretic.   HENT:      Head: Normocephalic and atraumatic.      Right Ear: External ear normal.      Left Ear: External ear normal.      Nose: Nose normal.      Mouth/Throat:      Mouth: Mucous membranes are moist.   Eyes:      General:         Right eye: No discharge.         Left eye: No discharge.   Cardiovascular:      Rate and Rhythm: Tachycardia present.   Pulmonary:      Breath sounds: Wheezing present.      Comments: Increased work of breathing, oxygen 6 liters  Abdominal:      Palpations: Abdomen is soft.   Musculoskeletal:      Right lower leg: No edema.      Left lower leg: No edema.   Skin:     General: Skin is warm and dry.   Neurological:      Mental Status: He is alert and oriented to person, place, and time.   Psychiatric:         Attention and Perception: He is inattentive.         Mood and Affect: Affect is flat.         Speech: Speech is delayed.           Advance Care Planning   Advance Directives:   Goals of Care: What is most important right now is to focus on spending time at home, avoiding the hospital, remaining as independent as possible, symptom/pain control, quality of life, even if it means sacrificing a little time. Accordingly, we have decided that the  best plan to meet the patient's goals includes enrolling in hospice care.       Significant Labs: All pertinent labs within the past 24 hours have been reviewed.  CBC:   Recent Labs   Lab 03/01/23 0536   WBC 6.36   HGB 14.1   HCT 42.8   MCV 86        BMP:  Recent Labs   Lab 03/01/23 0536   *      K 3.8      CO2 23   BUN 14   CREATININE 0.9   CALCIUM 9.3   MG 1.7     LFT:  Lab Results   Component Value Date    AST 12 03/01/2023    ALKPHOS 118 03/01/2023    BILITOT 0.6 03/01/2023     Albumin:   Albumin   Date Value Ref Range Status   03/01/2023 3.3 (L) 3.5 - 5.2 g/dL Final     Protein:   Total Protein   Date Value Ref Range Status   03/01/2023 6.8 6.0 - 8.4 g/dL Final     Lactic acid:   Lab Results   Component Value Date    LACTATE 1.9 12/16/2022    LACTATE 2.3 (H) 12/16/2022       Significant Imaging: I have reviewed all pertinent imaging results/findings within the past 24 hours. CXR        > 50% of 70 min visit spent in chart review, face to face discussion of goals of care,  symptom assessment, coordination of care and emotional support.    Eli Arango NP  Palliative Medicine  [facility]

## 2023-03-01 NOTE — ASSESSMENT & PLAN NOTE
-Outpatient follow-up with Oncology  -currently on palliative treatment   -palliative care team consulted

## 2023-03-01 NOTE — ASSESSMENT & PLAN NOTE
Patient with Hypoxic Respiratory failure which is Acute on chronic.  he is on home oxygen at 2 LPM. Supplemental oxygen was provided and noted- Oxygen Concentration (%):  [50] 50.   Signs/symptoms of respiratory failure include- increased work of breathing and respiratory distress. Contributing diagnoses includes - COPD Labs and images were reviewed. Patient Has not had a recent ABG. Will treat underlying causes and adjust management of respiratory failure as follows- See plan for COPD

## 2023-03-01 NOTE — ASSESSMENT & PLAN NOTE
- History of COPD on supplemental O2  - Presented with worsening hypoxia  - Likely in exacerbation    Plan:   - Continue steroids.  - Duo-Nebs scheduled  - Doxycyline

## 2023-03-01 NOTE — ED NOTES
Adult Physical Assessment  LOC: Kashif Iverson, 62 y.o. male verified via two identifiers.  The patient is awake, alert, oriented and speaking appropriately at this time.  APPEARANCE: Patient appears to be in acute distress at this time. Tachypnic and tachycardic w low SPO2 on high flow O2.Patient is clean and well groomed, patient's clothing is properly fastened.*-  SKIN:The skin is warm and dry, color consistent with ethnicity, patient has normal skin turgor and moist mucus membranes, skin intact, no breakdown but  brusing noted.  MUSCULOSKELETAL: Patient moving all extremities well, no obvious swelling or deformities noted.  RESPIRATORY: IN DISTRESS, placed on NRB mask at 15 L/M w SPO2 increasing to 97%. Will wean O2 as appropriate.   CARDIAC: 12 lead EKG performed and MD will evaluate. 2-3+ periphreal edema noted  BLE and LUE.  extremity, capillary refill < 3 seconds in all extremities  ABDOMEN: Soft and non tender to palpation, no abdominal distention noted. Bowel sounds present in all four quadrants.  NEUROLOGIC: Eyes open spontaneously, behavior appropriate to situation, follows commands, facial expression symmetrical, bilateral hand grasp equal and even, purposeful motor response noted, normal sensation in all extremities when touched with a finger.

## 2023-03-01 NOTE — PLAN OF CARE
[place] [facility] S[facility]  Initial Discharge Assessment  TN lives with spouseRose. Independent.  NP Eli and hospitalist recommends hospice; however when Eli contacted [facility], [phone] came and talked with patient and his wife Natty [phone]. Shania says both the patient and spouse declined hospice and home health.   3:30 PM TN called Natty Iverson x2 left detailed messages regarding discharge planning.  Natty was not here this morning and left after talking with [facility].  Plan A:  Home with family and NP at home Palliative  Plan B:  Home with family  Primary Care Provider: Eduardo Ruiz MD    Admission Diagnosis: Hypoxia [R09.02]  Chest pain [R07.9]  Acute on chronic heart failure, unspecified heart failure type [I50.9]    Admission Date: 2/28/2023  Expected Discharge Date:     Discharge Barriers Identified: None    Payor: MEDICARE / Plan: MEDICARE PART A & B / Product Type: Government /     Extended Emergency Contact Information  Primary Emergency Contact: Natty Iverson  Mobile Phone: [phone]  Relation: Spouse  Preferred language: English   needed? No  Secondary Emergency Contact: Rolando Iverson  Mobile Phone: [phone]  Relation: Brother  Preferred language: English   needed? No    Discharge Plan A: Home with family  Discharge Plan B: Home with family      [facility] #[id number] [place] [address] AT [facility] & [address] [address]  Phone: [phone] Fax: [phone]    [facility] [address]  [address] [place] [address]  Phone: [phone] Fax: [phone]      Initial Assessment (most recent)       Adult Discharge Assessment - 03/01/23 1049          Discharge Assessment    Assessment Type Discharge Planning Assessment     Confirmed/corrected address, phone number and insurance Yes     Confirmed Demographics Correct on Facesheet     Source of Information patient     Communicated [place]  with patient/caregiver Date not available/Unable to determine     Reason For Admission Hypoxia     People in Home spouse     Do you have help at home or someone to help you manage your care at home? Yes     Prior to hospitilization cognitive status: Alert/Oriented     Current cognitive status: Alert/Oriented     Equipment Currently Used at Home none     Patient currently being followed by outpatient case management? Unable to determine (comments)     Do you take prescription medications? Yes     Do you have prescription coverage? Yes     Coverage MEDICARE - MEDICARE PART A & B     Do you have any problems affording any of your prescribed medications? No     Who is going to help you get home at discharge? Natty Iverson (Spouse)   [phone] (Mobile     How do you get to doctors appointments? family or friend will provide     Are you on dialysis? No     Do you take coumadin? No     Discharge Plan A Home with family     Discharge Plan B Home with family     DME Needed Upon Discharge  other (see comments)   tbd    Discharge Plan discussed with: Patient   spouse came late and after taling with Shania with [facility]osp[facility] left and [facility] [place] [facility]. TN left message.    Discharge Barriers Identified None        OTHER    Name(s) of People in Home Natty Iverson (Spouse)   [phone] (Mobile)

## 2023-03-01 NOTE — NURSING
[facility]  Nurses Note -- 4 Eyes      3/1/2023       Skin assessed on: Admit      [x] No Pressure Injuries Present    []Prevention Measures Documented    [] Yes LDA  for Pressure Injury Previously documented     [] Yes New Pressure Injury Discovered   [] LDA for New Pressure Injury Added      Attending RN:  Roseann Govea, RN     Second RN:  Alexis Cook LPN

## 2023-03-01 NOTE — PLAN OF CARE
Spouse Natty [phone] called back discussed discharge plan request admit to NP Palliative at home around the March 22, 2023.

## 2023-03-01 NOTE — PROGRESS NOTES
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     Patient was sent from oncology clinic to the ED due to hypoxia.  Patient denies having any complaints and reports feeling fine.  He has multiple prior hospitalizations for similar presentation, related to COPD exacerbation/volume overload. In the ED, the patient was tachypneic, hypoxic requiring a venturi mask (50%, 15 L/min).  Left showed an elevated BNP (364), elevated troponin (0.031).  Chest x-ray showed small right and moderate left pleural effusions and patchy airspace disease throughout the left lung.  The patient was given Lasix 80 mg IV.  Solu-Medrol 125 mg IV, and DuoNeb x1.  He was admitted for further management.      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  Presented to oncology clinic hypoxic with O2 sats of 88% on 6 L. required Venti mask initially for improvement.    Labs overall unremarkable.  Chest x-ray w/ Small  right and moderate left pleural effusions with underlying airspace disease and/or atelectasis not excluded.  Patchy airspace disease throughout the left lung.  Persistent masslike opacity in the left suprahilar region  with adjacent atelectasis or scarring. Was given Lasix and Solu-Medrol in the ED and started on COPD pathway. Palliative care team consulted       Interval History:  No acute overnight events.  Patient remained afebrile.  Currently on 6L HFNC on my exam.  However, patient denies feeling any shortness of breath.  Admits mildly productive cough, but states the sputum appears clear.  Denies any chest pain or difficulty breathing.  Feeling much better today than yesterday    Review of Systems   Constitutional:  Negative for chills, fatigue and fever.   HENT:  Negative for congestion.    Respiratory:  Positive for cough. Negative for chest tightness, shortness of breath and wheezing.    Cardiovascular:  Negative for chest pain, palpitations and leg swelling.   Gastrointestinal:  Negative for abdominal pain, nausea and vomiting.   Genitourinary:  Negative for difficulty urinating.   Musculoskeletal:  Negative for joint swelling.   Neurological:  Negative for dizziness, weakness and headaches.   Psychiatric/Behavioral:  Negative for confusion and hallucinations.      Objective:     Vital Signs (Most Recent):  Temp: 97.8 °F (36.6 °C) (03/01/23 1100)  Pulse: 93 (03/01/23 1100)  Resp: 20 (03/01/23 1100)  BP: (!) 100/56 (03/01/23 1100)  SpO2: (!) 94 % (03/01/23 1100)   Vital Signs (24h Range):  Temp:  [97.6 °F (36.4 °C)-98.8 °F (37.1 °C)] 97.8 °F (36.6 °C)  Pulse:  [] 93  Resp:  [18-38] 20  SpO2:  [79 %-98 %] 94 %  BP: (100-142)/(55-86) 100/56     Weight: 81.5 kg (179 lb 10.8 oz)  Body mass index is 25.78 kg/m².    Intake/Output Summary (Last 24 hours) at 3/1/2023 1134  Last data filed at 3/1/2023 0953  Gross per 24 hour   Intake 240 ml   Output 1200 ml   Net -960 ml      Physical Exam  Vitals and nursing note reviewed.   Constitutional:       General: He is not in acute distress.     Appearance: He is ill-appearing (chronically ill appearing).   HENT:      Head: Normocephalic and atraumatic.      Nose: Nose normal.       Mouth/Throat:      Mouth: Mucous membranes are moist.   Eyes:      Extraocular Movements: Extraocular movements intact.   Cardiovascular:      Rate and Rhythm: Normal rate and regular rhythm.      Pulses: Normal pulses.      Heart sounds: No murmur heard.  Pulmonary:      Effort: Pulmonary effort is normal. No respiratory distress.      Breath sounds: Wheezing (With forced expiration) and rhonchi present.   Abdominal:      General: Abdomen is flat. There is no distension.      Palpations: Abdomen is soft.      Tenderness: There is no abdominal tenderness.   Musculoskeletal:      Right lower leg: No edema.      Left lower leg: No edema.   Skin:     General: Skin is warm and dry.      Capillary Refill: Capillary refill takes less than 2 seconds.   Neurological:      General: No focal deficit present.      Mental Status: He is alert and oriented to person, place, and time.   Psychiatric:         Mood and Affect: Mood normal.       Significant Labs: All pertinent labs within the past 24 hours have been reviewed.    Significant Imaging: I have reviewed all pertinent imaging results/findings within the past 24 hours.      Assessment/Plan:      * Acute hypoxemic respiratory failure  Patient with Hypoxic Respiratory failure which is Acute on chronic.  he is on home oxygen at 2 LPM. Supplemental oxygen was provided and noted- Oxygen Concentration (%):  [50] 50.   Signs/symptoms of respiratory failure include- increased work of breathing and respiratory distress. Contributing diagnoses includes - COPD Labs and images were reviewed. Patient Has not had a recent ABG. Will treat underlying causes and adjust management of respiratory failure as follows- See plan for COPD    -likely secondary to COPD exacerbation in patient with history of lung cancer with metastatic disease.  -see COPD plan  -wean O2 as tolerated    COPD exacerbation  -Admitted with shortness of breath, cough, productive sputum  With hypoxic respiratory failure  requiring ventimask at 15L   -CXR with Small  right and moderate left pleural effusions with underlying airspace disease and/or atelectasis not excluded.  Patchy airspace disease throughout the left lung.  Persistent masslike opacity in the left suprahilar region with adjacent atelectasis or scarring  -Started COPD pathway  -Start prednisone 40mg daily x5 days, nebs Q4 scheduled, doxycycline x5 days  -SpO2 goal 88-92%  -Wean O2 as tolerated    Chronic diastolic heart failure  Doubt exacerbation   Results for orders placed during the hospital encounter of 09/28/22    Echo    Interpretation Summary  · The left ventricle is normal in size with concentric hypertrophy and normal systolic function.  · The estimated ejection fraction is 60%.  · Normal left ventricular diastolic function.  · Mild right ventricular enlargement with mildly reduced right ventricular systolic function.  · Mild left atrial enlargement.  · Mild right atrial enlargement.  · Normal central venous pressure (3 mmHg).  · The estimated PA systolic pressure is 40 mmHg.  · Trivial posterior pericardial effusion.    BNP  Recent Labs   Lab 02/28/23  1657   *       Recent Labs   Lab 02/28/23  1657   TROPONINI 0.031*       -CXR as above  -Last TTE as above  -Resume home lasix  -Monitor Is/Os  -Doubt exacerbation at this time       Persistent atrial fibrillation  [id number] Score: 3.   Continue metoprolol and Eliquis    Essential hypertension  Continue home medications with hold parameters:  Amlodipine and metoprolol      Coronary artery disease involving native coronary artery of native heart without angina pectoris  Continue home medications:  Atorvastatin, Plavix, metoprolol      Pleural effusion  -CXR reviewed in personally interpreted.  Appears to have bilateral pleural effusion, left greater than right  -Consider pulmonary consult for thoracentesis if hypoxia is not improving      Lung cancer, main bronchus, left  -Outpatient follow-up with  Oncology  -currently on palliative treatment   -palliative care team consulted      PAD (peripheral artery disease)  Continue home medications:  cilostazol, aspirin, statin      Advanced care planning/counseling discussion  Advance Care Planning     Date: 03/01/2023    Code Status  In light of the patients advanced and life limiting illness,I engaged the the patient in a conversation about the patient's preferences for care  at the very end of life. The patient wishes to have CPR and other invasive treatments performed when his heart and/or breathing stops. I communicated to the patient that his wishes currently align with full code status. Her verbalized understanding and stated to defer further conversation with his spouse. Discussed with him that palliative team will be consulted. He is agreeable to discuss with palliative care team as well.   I spent a total of 17 minutes engaging the patient in this advance care planning discussion.         Code Status: Full Code  Palliative care team consulted         VTE Risk Mitigation (From admission, onward)         Ordered     apixaban tablet 5 mg  2 times daily         02/28/23 2017     IP VTE HIGH RISK PATIENT  Once         02/28/23 2017     Place sequential compression device  Until discontinued         02/28/23 2017                Discharge Planning   AZ:      Code Status: Full Code   Is the patient medically ready for discharge?:     Reason for patient still in hospital (select all that apply): Patient trending condition, Treatment and Consult recommendations                     Kayla Coleman DO  Department of Hospital Medicine   [facility] [place] - Med Surg

## 2023-03-01 NOTE — PLAN OF CARE
Problem: Adult Inpatient Plan of Care  Goal: Plan of Care Review  Outcome: Ongoing, Progressing  Goal: Patient-Specific Goal (Individualized)  Outcome: Ongoing, Progressing  Goal: Absence of Hospital-Acquired Illness or Injury  Outcome: Ongoing, Progressing  Goal: Optimal Comfort and Wellbeing  Outcome: Ongoing, Progressing  Goal: Readiness for Transition of Care  Outcome: Ongoing, Progressing     Problem: Infection (Pneumonia)  Goal: Resolution of Infection Signs and Symptoms  Outcome: Ongoing, Progressing     Problem: Respiratory Compromise (Pneumonia)  Goal: Effective Oxygenation and Ventilation  Outcome: Ongoing, Progressing     Problem: Infection  Goal: Absence of Infection Signs and Symptoms  Outcome: Ongoing, Progressing     Problem: Skin Injury Risk Increased  Goal: Skin Health and Integrity  Outcome: Ongoing, Progressing     Problem: Coping Ineffective  Goal: Effective Coping  Outcome: Ongoing, Progressing     Problem: Adjustment to Illness COPD (Chronic Obstructive Pulmonary Disease)  Goal: Optimal Chronic Illness Coping  Outcome: Ongoing, Progressing     Problem: Functional Ability Impaired COPD (Chronic Obstructive Pulmonary Disease)  Goal: Optimal Level of Functional [place]  Outcome: Ongoing, Progressing     Problem: Infection COPD (Chronic Obstructive Pulmonary Disease)  Goal: Absence of Infection Signs and Symptoms  Outcome: Ongoing, Progressing     Problem: Oral Intake Inadequate COPD (Chronic Obstructive Pulmonary Disease)  Goal: Improved Nutrition Intake  Outcome: Ongoing, Progressing     Problem: Respiratory Compromise COPD (Chronic Obstructive Pulmonary Disease)  Goal: Effective Oxygenation and Ventilation  Outcome: Ongoing, Progressing

## 2023-03-01 NOTE — ASSESSMENT & PLAN NOTE
Advance Care Planning     Date: 03/01/2023    Code Status  In light of the patients advanced and life limiting illness,I engaged the the patient in a conversation about the patient's preferences for care  at the very end of life. The patient wishes to have CPR and other invasive treatments performed when his heart and/or breathing stops. I communicated to the patient that his wishes currently align with full code status. Her verbalized understanding and stated to defer further conversation with his spouse. Discussed with him that palliative team will be consulted. He is agreeable to discuss with palliative care team as well.   I spent a total of 17 minutes engaging the patient in this advance care planning discussion.          Code Status: Full Code  Palliative care team consulted

## 2023-03-01 NOTE — HPI
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     Patient was sent from oncology clinic to the ED due to hypoxia.  Patient denies having any complaints and reports feeling fine.  He has multiple prior hospitalizations for similar presentation, related to COPD exacerbation/volume overload. In the ED, the patient was tachypneic, hypoxic requiring a venturi mask (50%, 15 L/min).  Left showed an elevated BNP (364), elevated troponin (0.031).  Chest x-ray showed small right and moderate left pleural effusions and patchy airspace disease throughout the left lung.  The patient was given Lasix 80 mg IV.  Solu-Medrol 125 mg IV, and DuoNeb x1.  He was admitted for further management.   Home

## 2023-03-01 NOTE — ED NOTES
Pt's o2 sats in 80s. Had pt sit up higher. Notified Dr. Agee, gave orders to place pt on condom cath and venti mask

## 2023-03-01 NOTE — ASSESSMENT & PLAN NOTE
Doubt exacerbation   Results for orders placed during the hospital encounter of 09/28/22    Echo    Interpretation Summary  · The left ventricle is normal in size with concentric hypertrophy and normal systolic function.  · The estimated ejection fraction is 60%.  · Normal left ventricular diastolic function.  · Mild right ventricular enlargement with mildly reduced right ventricular systolic function.  · Mild left atrial enlargement.  · Mild right atrial enlargement.  · Normal central venous pressure (3 mmHg).  · The estimated PA systolic pressure is 40 mmHg.  · Trivial posterior pericardial effusion.    BNP  Recent Labs   Lab 02/28/23  1657   *       Recent Labs   Lab 02/28/23  1657   TROPONINI 0.031*       -CXR as above  -Last TTE as above  -Resume home lasix  -Monitor Is/Os  -Doubt exacerbation at this time

## 2023-03-01 NOTE — ASSESSMENT & PLAN NOTE
Patient with Hypoxic Respiratory failure which is Acute on chronic.  he is on home oxygen at 2 LPM. Supplemental oxygen was provided and noted- Oxygen Concentration (%):  [50] 50.   Signs/symptoms of respiratory failure include- increased work of breathing and respiratory distress. Contributing diagnoses includes - COPD Labs and images were reviewed. Patient Has not had a recent ABG. Will treat underlying causes and adjust management of respiratory failure as follows- See plan for COPD    -likely secondary to COPD exacerbation in patient with history of lung cancer with metastatic disease.  -see COPD plan  -wean O2 as tolerated

## 2023-03-01 NOTE — HPI
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      Patient was sent from oncology clinic to the ED due to hypoxia.  Patient denies having any complaints and reports feeling fine.  He has multiple prior hospitalizations for similar presentation, related to COPD exacerbation/volume overload. In the ED, the patient was tachypneic, hypoxic requiring a venturi mask (50%, 15 L/min).  Left showed an elevated BNP (364), elevated troponin (0.031).  Chest x-ray showed small right and moderate left pleural effusions and patchy airspace disease throughout the left lung.  The patient was given Lasix 80 mg IV.  Solu-Medrol 125 mg IV, and DuoNeb x1.  He was admitted for further management.

## 2023-03-01 NOTE — SUBJECTIVE & OBJECTIVE
Past Medical History:   Diagnosis Date    Chronic obstructive pulmonary disease with acute exacerbation 9/5/2022    Combined systolic and diastolic heart failure     Dependence on supplemental oxygen 5/24/2022    History of completed stroke 9/3/2019     09/03/2019 On CT exam    History of non-ST elevation myocardial infarction (NSTEMI) 2/22/2022    Hypertension     Lung cancer     NSCLC    Lung mass     left lung    Macrocytic anemia 8/24/2019    PAD (peripheral artery disease) 3/14/2022    LUIS FELIPE 3/11/22    Peripheral artery disease        Past Surgical History:   Procedure Laterality Date    BRONCHOSCOPY N/A 08/30/2019    Procedure: Bronchoscopy;  Surgeon: [facility] Diagnostic Provider;  Location: [facility] OR [address] [place] [address];  Service: Anesthesiology;  Laterality: N/A;    CORONARY ANGIOGRAPHY N/A 03/04/2022    Procedure: ANGIOGRAM, CORONARY ARTERY;  Surgeon: Robson Green MD;  Location: [facility] CATH LAB;  Service: Cardiology;  Laterality: N/A;    INSERTION OF TUNNELED CENTRAL VENOUS CATHETER (CVC) WITH SUBCUTANEOUS PORT         Review of patient's allergies indicates:  No Known Allergies    Current Facility-Administered Medications on File Prior to Encounter   Medication    heparin, porcine (PF) 100 unit/mL injection flush 500 Units    sodium chloride 0.9% flush 10 mL     Current Outpatient Medications on File Prior to Encounter   Medication Sig    albuterol (VENTOLIN HFA) 90 mcg/actuation inhaler Inhale 2 puffs into the lungs every 6 (six) hours as needed for Wheezing. Rescue    albuterol-ipratropium (DUO-NEB) 2.5 mg-0.5 mg/3 mL nebulizer solution Inhale contents of 1 vial (3 mLs) by nebulization every 4 (four) hours as needed for Wheezing or Shortness of Breath. Rescue    amLODIPine (NORVASC) 10 MG tablet Take 1 tablet (10 mg total) by mouth once daily.    apixaban (ELIQUIS) 5 mg Tab Take 1 tablet (5 mg total) by mouth 2 (two) times daily.    ascorbic acid-multivit-min (VITAMIN C ENERGY BOOSTER) 1,000 mg PwEP Take 3,000 mg by mouth  once daily. Patient takes 3,000 mg per day    aspirin 81 MG Chew Take 1 tablet (81 mg total) by mouth once daily.    atorvastatin (LIPITOR) 80 MG tablet Take 1 tablet (80 mg total) by mouth once daily.    cilostazoL (PLETAL) 100 MG Tab Take 1 tablet (100 mg total) by mouth 2 (two) times daily.    fluticasone-salmeterol diskus inhaler 100-50 mcg Inhale 1 puff into the lungs 2 (two) times daily. Controller    furosemide (LASIX) 40 MG tablet Take 1 tablet (40 mg total) by mouth 2 (two) times a day. (Patient not taking: Reported on 2/7/2023)    metoprolol tartrate (LOPRESSOR) 50 MG tablet Take 1 tablet (50 mg total) by mouth 3 (three) times daily.    polyethylene glycol (GLYCOLAX) 17 gram/dose powder Mix 1 capful (17 grams total) with a liquid and take by mouth once daily.    tiotropium (SPIRIVA) 18 mcg inhalation capsule Inhale 1 capsule (18 mcg total) into the lungs via handihaler once daily. Controller     Family History       Problem Relation (Age of Onset)    Cancer Father, Sister    Diabetes Mother    Heart defect Mother    No Known Problems Son          Tobacco Use    Smoking status: Former     Packs/day: 1.00     Years: 25.00     Pack years: 25.00     Types: Cigarettes     Quit date: 2020     Years since quitting: 3.1    Smokeless tobacco: Never   Substance and Sexual Activity    Alcohol use: Yes     Comment: 11/3/22: Occ.    Drug use: Never    Sexual activity: Yes     Partners: Female     Review of Systems   Constitutional: Negative.    HENT: Negative.     Eyes: Negative.    Respiratory: Negative.     Cardiovascular: Negative.    Gastrointestinal: Negative.    Endocrine: Negative.    Genitourinary: Negative.    Musculoskeletal: Negative.    Skin: Negative.    Allergic/Immunologic: Negative.    Neurological: Negative.    Psychiatric/Behavioral: Negative.     Objective:     Vital Signs (Most Recent):  Temp: 97.9 °F (36.6 °C) (02/28/23 1916)  Pulse: 109 (02/28/23 2003)  Resp: (!) 25 (02/28/23 1932)  BP: 135/83  (02/28/23 2003)  SpO2: (!) 90 % (02/28/23 2003)   Vital Signs (24h Range):  Temp:  [97.6 °F (36.4 °C)-97.9 °F (36.6 °C)] 97.9 °F (36.6 °C)  Pulse:  [] 109  Resp:  [20-38] 25  SpO2:  [82 %-98 %] 90 %  BP: (119-142)/(73-85) 135/83     Weight: 85.3 kg (188 lb)  Body mass index is 26.98 kg/m².    Physical Exam  Vitals and nursing note reviewed.   Constitutional:       General: He is not in acute distress.     Appearance: He is ill-appearing.   HENT:      Head: Normocephalic and atraumatic.      Nose: Nose normal.      Mouth/Throat:      Mouth: Mucous membranes are moist.   Eyes:      Extraocular Movements: Extraocular movements intact.   Cardiovascular:      Rate and Rhythm: Normal rate.      Pulses: Normal pulses.      Heart sounds: No murmur heard.  Pulmonary:      Effort: Pulmonary effort is normal. No respiratory distress.      Breath sounds: Wheezing (With forced expiration) and rhonchi present.   Abdominal:      General: Abdomen is flat.      Palpations: Abdomen is soft.      Tenderness: There is no abdominal tenderness.   Musculoskeletal:      Right lower leg: No edema.      Left lower leg: No edema.   Skin:     General: Skin is warm.      Capillary Refill: Capillary refill takes less than 2 seconds.   Neurological:      General: No focal deficit present.      Mental Status: He is alert.   Psychiatric:         Mood and Affect: Mood normal.           Significant Labs: All pertinent labs within the past 24 hours have been reviewed.    Significant Imaging: I have reviewed all pertinent imaging results/findings within the past 24 hours.

## 2023-03-01 NOTE — ASSESSMENT & PLAN NOTE
Doubt exacerbation   Results for orders placed during the hospital encounter of 09/28/22    Echo    Interpretation Summary  · The left ventricle is normal in size with concentric hypertrophy and normal systolic function.  · The estimated ejection fraction is 60%.  · Normal left ventricular diastolic function.  · Mild right ventricular enlargement with mildly reduced right ventricular systolic function.  · Mild left atrial enlargement.  · Mild right atrial enlargement.  · Normal central venous pressure (3 mmHg).  · The estimated PA systolic pressure is 40 mmHg.  · Trivial posterior pericardial effusion.    Resume home lasix

## 2023-03-01 NOTE — ASSESSMENT & PLAN NOTE
-Admitted with shortness of breath, cough, productive sputum  With hypoxic respiratory failure requiring ventimask at 15L   -CXR with Small  right and moderate left pleural effusions with underlying airspace disease and/or atelectasis not excluded.  Patchy airspace disease throughout the left lung.  Persistent masslike opacity in the left suprahilar region with adjacent atelectasis or scarring  -Started COPD pathway  -Start prednisone 40mg daily x5 days, nebs Q4 scheduled, doxycycline x5 days  -SpO2 goal 88-92%  -Wean O2 as tolerated

## 2023-03-02 NOTE — SUBJECTIVE & OBJECTIVE
"Interval History:  No acute overnight events.  Patient remained afebrile.  Currently on 5L HFNC this morning. Does not appear to be in distress.  Patient denies feeling any shortness of breath.  Admits cough. Denies any chest pain or difficulty breathing.  Feeling good today. Does complain of chronic back pain "for years".     Review of Systems   Constitutional:  Negative for chills, fatigue and fever.   HENT:  Negative for congestion.    Respiratory:  Positive for cough. Negative for chest tightness, shortness of breath and wheezing.    Cardiovascular:  Negative for chest pain, palpitations and leg swelling.   Gastrointestinal:  Negative for abdominal pain, nausea and vomiting.   Genitourinary:  Negative for difficulty urinating.   Musculoskeletal:  Negative for joint swelling.   Neurological:  Negative for dizziness, weakness and headaches.   Psychiatric/Behavioral:  Negative for confusion and hallucinations.      Objective:     Vital Signs (Most Recent):  Temp: 97.9 °F (36.6 °C) (03/02/23 0800)  Pulse: (!) 113 (03/02/23 0842)  Resp: 20 (03/02/23 0842)  BP: 113/79 (03/02/23 0800)  SpO2: (!) 93 % (03/02/23 0845)   Vital Signs (24h Range):  Temp:  [97.8 °F (36.6 °C)-98.7 °F (37.1 °C)] 97.9 °F (36.6 °C)  Pulse:  [] 113  Resp:  [18-20] 20  SpO2:  [89 %-99 %] 93 %  BP: (100-133)/(56-80) 113/79     Weight: 81.5 kg (179 lb 10.8 oz)  Body mass index is 25.78 kg/m².    Intake/Output Summary (Last 24 hours) at 3/2/2023 1052  Last data filed at 3/2/2023 0815  Gross per 24 hour   Intake 720 ml   Output 1550 ml   Net -830 ml        Physical Exam  Vitals and nursing note reviewed.   Constitutional:       General: He is not in acute distress.     Appearance: He is ill-appearing (chronically ill appearing).   HENT:      Head: Normocephalic and atraumatic.      Nose: Nose normal.      Mouth/Throat:      Mouth: Mucous membranes are moist.   Eyes:      Extraocular Movements: Extraocular movements intact.   Cardiovascular:      " Rate and Rhythm: Regular rhythm. Tachycardia present.      Pulses: Normal pulses.      Heart sounds: No murmur heard.  Pulmonary:      Effort: Pulmonary effort is normal. No respiratory distress.      Breath sounds: Rhonchi present. No wheezing (no wheezing on exam today).   Abdominal:      General: Abdomen is flat. There is no distension.      Palpations: Abdomen is soft.      Tenderness: There is no abdominal tenderness.   Musculoskeletal:      Right lower leg: No edema.      Left lower leg: No edema.   Skin:     General: Skin is warm and dry.      Capillary Refill: Capillary refill takes less than 2 seconds.   Neurological:      General: No focal deficit present.      Mental Status: He is alert and oriented to person, place, and time.   Psychiatric:         Mood and Affect: Affect is flat.         Thought Content: Thought content normal.       Significant Labs: All pertinent labs within the past 24 hours have been reviewed.    Significant Imaging: I have reviewed all pertinent imaging results/findings within the past 24 hours.

## 2023-03-02 NOTE — ASSESSMENT & PLAN NOTE
Patient with Hypoxic Respiratory failure which is Acute on chronic.  he is on home oxygen at 2 LPM. Supplemental oxygen was provided and noted-  .   Signs/symptoms of respiratory failure include- increased work of breathing and respiratory distress. Contributing diagnoses includes - COPD Labs and images were reviewed. Patient Has not had a recent ABG. Will treat underlying causes and adjust management of respiratory failure as follows- See plan for COPD    -likely secondary to COPD exacerbation in patient with history of lung cancer with metastatic disease.  -see COPD plan  -wean O2 as tolerated, on 5L

## 2023-03-02 NOTE — ASSESSMENT & PLAN NOTE
Advance Care Planning     Date: 03/01/2023    Code Status  In light of the patients advanced and life limiting illness,I engaged the the patient in a conversation about the patient's preferences for care  at the very end of life. The patient wishes to have CPR and other invasive treatments performed when his heart and/or breathing stops. I communicated to the patient that his wishes currently align with full code status. Her verbalized understanding and stated to defer further conversation with his spouse. Discussed with him that palliative team will be consulted. He is agreeable to discuss with palliative care team as well.   I spent a total of 17 minutes engaging the patient in this advance care planning discussion.          Code Status: Full Code  Palliative care team consulted   Pt qualifies for hospice given End stage COPD

## 2023-03-02 NOTE — PLAN OF CARE
Problem: Adult Inpatient Plan of Care  Goal: Plan of Care Review  Outcome: Ongoing, Progressing  Goal: Patient-Specific Goal (Individualized)  Outcome: Ongoing, Progressing  Goal: Absence of Hospital-Acquired Illness or Injury  Outcome: Ongoing, Progressing  Goal: Optimal Comfort and Wellbeing  Outcome: Ongoing, Progressing  Goal: Readiness for Transition of Care  Outcome: Ongoing, Progressing     Problem: Fluid Imbalance (Pneumonia)  Goal: Fluid Balance  Outcome: Ongoing, Progressing     Problem: Infection (Pneumonia)  Goal: Resolution of Infection Signs and Symptoms  Outcome: Ongoing, Progressing     Problem: Respiratory Compromise (Pneumonia)  Goal: Effective Oxygenation and Ventilation  Outcome: Ongoing, Progressing     Problem: Infection  Goal: Absence of Infection Signs and Symptoms  Outcome: Ongoing, Progressing     Problem: Skin Injury Risk Increased  Goal: Skin Health and Integrity  Outcome: Ongoing, Progressing     Problem: Coping Ineffective  Goal: Effective Coping  Outcome: Ongoing, Progressing     Problem: Adjustment to Illness COPD (Chronic Obstructive Pulmonary Disease)  Goal: Optimal Chronic Illness Coping  Outcome: Ongoing, Progressing     Problem: Functional Ability Impaired COPD (Chronic Obstructive Pulmonary Disease)  Goal: Optimal Level of Functional [place]  Outcome: Ongoing, Progressing     Problem: Infection COPD (Chronic Obstructive Pulmonary Disease)  Goal: Absence of Infection Signs and Symptoms  Outcome: Ongoing, Progressing     Problem: Oral Intake Inadequate COPD (Chronic Obstructive Pulmonary Disease)  Goal: Improved Nutrition Intake  Outcome: Ongoing, Progressing     Problem: Respiratory Compromise COPD (Chronic Obstructive Pulmonary Disease)  Goal: Effective Oxygenation and Ventilation  Outcome: Ongoing, Progressing   Patient is on 4 liters per nasal cannula maintaining 91%

## 2023-03-02 NOTE — PROGRESS NOTES
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     Patient was sent from oncology clinic to the ED due to hypoxia.  Patient denies having any complaints and reports feeling fine.  He has multiple prior hospitalizations for similar presentation, related to COPD exacerbation/volume overload. In the ED, the patient was tachypneic, hypoxic requiring a venturi mask (50%, 15 L/min).  Left showed an elevated BNP (364), elevated troponin (0.031).  Chest x-ray showed small right and moderate left pleural effusions and patchy airspace disease throughout the left lung.  The patient was given Lasix 80 mg IV.  Solu-Medrol 125 mg IV, and DuoNeb x1.  He was admitted for further management.      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  Presented to oncology clinic hypoxic with O2 sats of 88% on 6 L. required Venti mask initially for improvement.    Labs overall unremarkable.  Chest x-ray w/ Small  right and moderate left pleural effusions with underlying airspace disease and/or atelectasis not excluded.  Patchy airspace disease throughout the left lung.  Persistent masslike opacity in the left suprahilar region  "with adjacent atelectasis or scarring. Was given Lasix and Solu-Medrol in the ED and started on COPD pathway. Palliative care team consulted. Patient and wife not ready for hospice at this time, but agreeable to have NP from palliative to visit home on discharge. Wean O2 as tolerated      Interval History:  No acute overnight events.  Patient remained afebrile.  Currently on 5L HFNC this morning. Does not appear to be in distress.  Patient denies feeling any shortness of breath.  Admits cough. Denies any chest pain or difficulty breathing.  Feeling good today. Does complain of chronic back pain "for years".     Review of Systems   Constitutional:  Negative for chills, fatigue and fever.   HENT:  Negative for congestion.    Respiratory:  Positive for cough. Negative for chest tightness, shortness of breath and wheezing.    Cardiovascular:  Negative for chest pain, palpitations and leg swelling.   Gastrointestinal:  Negative for abdominal pain, nausea and vomiting.   Genitourinary:  Negative for difficulty urinating.   Musculoskeletal:  Negative for joint swelling.   Neurological:  Negative for dizziness, weakness and headaches.   Psychiatric/Behavioral:  Negative for confusion and hallucinations.      Objective:     Vital Signs (Most Recent):  Temp: 97.9 °F (36.6 °C) (03/02/23 0800)  Pulse: (!) 113 (03/02/23 0842)  Resp: 20 (03/02/23 0842)  BP: 113/79 (03/02/23 0800)  SpO2: (!) 93 % (03/02/23 0845)   Vital Signs (24h Range):  Temp:  [97.8 °F (36.6 °C)-98.7 °F (37.1 °C)] 97.9 °F (36.6 °C)  Pulse:  [] 113  Resp:  [18-20] 20  SpO2:  [89 %-99 %] 93 %  BP: (100-133)/(56-80) 113/79     Weight: 81.5 kg (179 lb 10.8 oz)  Body mass index is 25.78 kg/m².    Intake/Output Summary (Last 24 hours) at 3/2/2023 1052  Last data filed at 3/2/2023 0815  Gross per 24 hour   Intake 720 ml   Output 1550 ml   Net -830 ml        Physical Exam  Vitals and nursing note reviewed.   Constitutional:       General: He is not in acute " distress.     Appearance: He is ill-appearing (chronically ill appearing).   HENT:      Head: Normocephalic and atraumatic.      Nose: Nose normal.      Mouth/Throat:      Mouth: Mucous membranes are moist.   Eyes:      Extraocular Movements: Extraocular movements intact.   Cardiovascular:      Rate and Rhythm: Regular rhythm. Tachycardia present.      Pulses: Normal pulses.      Heart sounds: No murmur heard.  Pulmonary:      Effort: Pulmonary effort is normal. No respiratory distress.      Breath sounds: Rhonchi present. No wheezing (no wheezing on exam today).   Abdominal:      General: Abdomen is flat. There is no distension.      Palpations: Abdomen is soft.      Tenderness: There is no abdominal tenderness.   Musculoskeletal:      Right lower leg: No edema.      Left lower leg: No edema.   Skin:     General: Skin is warm and dry.      Capillary Refill: Capillary refill takes less than 2 seconds.   Neurological:      General: No focal deficit present.      Mental Status: He is alert and oriented to person, place, and time.   Psychiatric:         Mood and Affect: Affect is flat.         Thought Content: Thought content normal.       Significant Labs: All pertinent labs within the past 24 hours have been reviewed.    Significant Imaging: I have reviewed all pertinent imaging results/findings within the past 24 hours.      Assessment/Plan:      * Acute hypoxemic respiratory failure  Patient with Hypoxic Respiratory failure which is Acute on chronic.  he is on home oxygen at 2 LPM. Supplemental oxygen was provided and noted-  .   Signs/symptoms of respiratory failure include- increased work of breathing and respiratory distress. Contributing diagnoses includes - COPD Labs and images were reviewed. Patient Has not had a recent ABG. Will treat underlying causes and adjust management of respiratory failure as follows- See plan for COPD    -likely secondary to COPD exacerbation in patient with history of lung cancer  with metastatic disease.  -see COPD plan  -wean O2 as tolerated, on 5L    COPD exacerbation  -Admitted with shortness of breath, cough, productive sputum  With hypoxic respiratory failure requiring ventimask at 15L   -CXR with Small  right and moderate left pleural effusions with underlying airspace disease and/or atelectasis not excluded.  Patchy airspace disease throughout the left lung.  Persistent masslike opacity in the left suprahilar region with adjacent atelectasis or scarring  -Started COPD pathway  -Start prednisone 40mg daily x5 days, nebs Q4 scheduled, doxycycline x5 days  -SpO2 goal 88-92%  -Wean O2 as tolerated    Chronic diastolic heart failure  Doubt exacerbation   Results for orders placed during the hospital encounter of 09/28/22    Echo    Interpretation Summary  · The left ventricle is normal in size with concentric hypertrophy and normal systolic function.  · The estimated ejection fraction is 60%.  · Normal left ventricular diastolic function.  · Mild right ventricular enlargement with mildly reduced right ventricular systolic function.  · Mild left atrial enlargement.  · Mild right atrial enlargement.  · Normal central venous pressure (3 mmHg).  · The estimated PA systolic pressure is 40 mmHg.  · Trivial posterior pericardial effusion.    BNP  Recent Labs   Lab 02/28/23  1657   *       Recent Labs   Lab 02/28/23  1657   TROPONINI 0.031*       -CXR as above  -Last TTE as above  -Resume home lasix  -Monitor Is/Os  -Doubt exacerbation at this time       Persistent atrial fibrillation  [id number] Score: 3.   Continue metoprolol and Eliquis    Essential hypertension  Continue home medications with hold parameters:  Amlodipine and metoprolol      Coronary artery disease involving native coronary artery of native heart without angina pectoris  Continue home medications:  Atorvastatin, Plavix, metoprolol      Pleural effusion  -CXR reviewed in personally interpreted.  Appears to have bilateral  pleural effusion, left greater than right  -Consider pulmonary consult for thoracentesis if hypoxia is not improving      Lung cancer, main bronchus, left  -Outpatient follow-up with Oncology  -currently on palliative treatment   -palliative care team consulted      PAD (peripheral artery disease)  Continue home medications:  cilostazol, aspirin, statin      Advanced care planning/counseling discussion  Advance Care Planning     Date: 03/01/2023    Code Status  In light of the patients advanced and life limiting illness,I engaged the the patient in a conversation about the patient's preferences for care  at the very end of life. The patient wishes to have CPR and other invasive treatments performed when his heart and/or breathing stops. I communicated to the patient that his wishes currently align with full code status. Her verbalized understanding and stated to defer further conversation with his spouse. Discussed with him that palliative team will be consulted. He is agreeable to discuss with palliative care team as well.   I spent a total of 17 minutes engaging the patient in this advance care planning discussion.         Code Status: Full Code  Palliative care team consulted   Pt qualifies for hospice given End stage COPD      VTE Risk Mitigation (From admission, onward)         Ordered     apixaban tablet 5 mg  2 times daily         02/28/23 2017     IP VTE HIGH RISK PATIENT  Once         02/28/23 2017     Place sequential compression device  Until discontinued         02/28/23 2017                Discharge Planning   [place]:      Code Status: Full Code   Is the patient medically ready for discharge?:     Reason for patient still in hospital (select all that apply): Patient trending condition, Treatment and Consult recommendations  Discharge Plan A: Home with family                  Kayla Coleman DO  Department of Hospital Medicine   [facility] [place] [facility]

## 2023-03-02 NOTE — PLAN OF CARE
Problem: Adult Inpatient Plan of Care  Goal: Plan of Care Review  Outcome: Ongoing, Progressing  Goal: Optimal Comfort and Wellbeing  Outcome: Ongoing, Progressing  Goal: Readiness for Transition of Care  Outcome: Ongoing, Progressing     Problem: Respiratory Compromise (Pneumonia)  Goal: Effective Oxygenation and Ventilation  Outcome: Ongoing, Progressing     Problem: Adjustment to Illness COPD (Chronic Obstructive Pulmonary Disease)  Goal: Optimal Chronic Illness Coping  Outcome: Ongoing, Progressing

## 2023-03-03 NOTE — PLAN OF CARE
Problem: Adult Inpatient Plan of Care  Goal: Plan of Care Review  Outcome: Ongoing, Progressing  Goal: Optimal Comfort and Wellbeing  Outcome: Ongoing, Progressing  Goal: Readiness for Transition of Care  Outcome: Ongoing, Progressing     Problem: Respiratory Compromise (Pneumonia)  Goal: Effective Oxygenation and Ventilation  Outcome: Ongoing, Progressing   Oxygen saturation greater than 88% on 4liters NC.  Patient removes at times, drops below 88% on cont pulse ox on the tele monitor. Instructed to keep on; needs reinforcement  Problem: Coping Ineffective  Goal: Effective Coping  Outcome: Ongoing, Progressing     Problem: Adjustment to Illness COPD (Chronic Obstructive Pulmonary Disease)  Goal: Optimal Chronic Illness Coping  Outcome: Ongoing, Progressing

## 2023-03-03 NOTE — PLAN OF CARE
Problem: Adjustment to Illness COPD (Chronic Obstructive Pulmonary Disease)  Goal: Optimal Chronic Illness Coping  Outcome: Ongoing, Progressing     Problem: Respiratory Compromise COPD (Chronic Obstructive Pulmonary Disease)  Goal: Effective Oxygenation and Ventilation  Outcome: Ongoing, Progressing

## 2023-03-03 NOTE — PLAN OF CARE
[facility] - Med Surg  Discharge Final Note  TN sent a secure chat to med surg nurse Sheridan that this patient cleared for discharge, wife will bring oxygen at 5pm and transport him home.    Primary Care Provider: Eduardo Ruiz MD    Expected Discharge Date: 3/3/2023    Final Discharge Note (most recent)       Final Note - 03/03/23 1443          Final Note    Assessment Type Final Discharge Note     Anticipated Discharge Disposition Home or Self Care     What phone number can be called within the next 1-3 days to see how you are doing after discharge? --   see chart    Hospital Resources/Appts/Education Provided Appointments scheduled and added to AVS;Provided patient/caregiver with written discharge plan information;Appointments scheduled by Navigator/Coordinator        Post-Acute Status    Coverage Medicare     Discharge Delays None known at this time                     Important Message from Medicare  Important Message from Medicare regarding Discharge Appeal Rights: Given to patient/caregiver, Explained to patient/caregiver, Signed/date by patient/caregiver     Date IMM was signed: 03/01/23  Time IMM was signed: 1436    Contact Info       Eduardo Ruiz MD   Specialty: Family Medicine   Relationship: PCP - General  Hypert[facility] Medicine Responsible Provider    [address] [facility]  [place] [address]   Phone: [phone]       Next Steps: Follow up in 1 week(s)    Guerline Higgins MD   Specialty: Hematology and Oncology    [address]  [address]  [place] [address]   Phone: [phone]       Next Steps: Follow up in 1 week(s)    [facility]   Specialty: Hospice and Palliative Medicine    [address]  [address]  [place] [address]   Phone: [phone]       Next Steps: Call

## 2023-03-03 NOTE — DISCHARGE SUMMARY
[facility] Medicine  Discharge Summary      Patient Name: Kashif Iverson  MRN: [id number]  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     Patient was sent from oncology clinic to the ED due to hypoxia.  Patient denies having any complaints and reports feeling fine.  He has multiple prior hospitalizations for similar presentation, related to COPD exacerbation/volume overload. In the ED, the patient was tachypneic, hypoxic requiring a venturi mask (50%, 15 L/min).  Left showed an elevated BNP (364), elevated troponin (0.031).  Chest x-ray showed small right and moderate left pleural effusions and patchy airspace disease throughout the left lung.  The patient was given Lasix 80 mg IV.  Solu-Medrol 125 mg IV, and DuoNeb x1.  He was admitted for further management.      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  Presented to oncology clinic hypoxic with O2 sats of 88% on 6 L. required Venti mask initially for improvement.    Labs overall unremarkable.  Chest x-ray w/ Small  right and moderate left pleural effusions with underlying airspace disease and/or atelectasis not  excluded.  Patchy airspace disease throughout the left lung.  Persistent masslike opacity in the left suprahilar region with adjacent atelectasis or scarring. Was given Lasix and Solu-Medrol in the ED and started on COPD pathway. Palliative care team consulted. Patient and wife not ready for hospice at this time, but agreeable to have NP from palliative to visit home on discharge. Able to wean O2 down to 3L NC. This may be patient's new baseline.    He states he feels good today and a lot better compared to presentation on admission.  Other requiring supplemental oxygen, patient denies feeling any shortness of breath.  Has a dry cough, but denies any difficulty breathing.  Admits lower back pain that has been chronic, near baseline. Pt denies any fever, headaches, vision changes, chest pain, shortness of breath, palpitations, abdominal pain, nausea, vomiting, or any new weaknesses. Feels ready to go home. Patient's exam on discharge was as follow: Patient is alert and oriented, appears in no acute distress but is chronically ill appearing, heart with regular rate and rhythm, lungs with ronchi, but no wheezing, abdomen soft and nontender, and no new weaknesses or focal deficits seen. Bilateral lower extremities without any edema or calf tenderness.     Patient was counseled regarding any abnormal labs, differential diagnosis, treatment options, risk-benefit, lifestyle changes, prognosis, current condition, and medications. Patient was interactive and attentive.  Patient's questions were answered in a respectful and timely manner. Patient was instructed to follow-up with PCP within 1 week and to continue taking medications as prescribed.  Instructed to also follow up with oncologist as well. Referral for palliative NP to visit home placed. Also, extensively discussed the risks, benefits, and side effects of patient's medications. Discussed with patient about any medication changes. Patient verbalized understanding and  agrees to treatment plan.  Patient is stable for discharge.  Patient has no other questions or concerns at this time.  ED precautions discussed with the patient. Also called and spoke with the patient's spouse and updated her on plan of care    Vital signs are stable. Ambulating without any difficulty. Tolerating p.o. intake without any nausea or vomiting. Afebrile for over 24 hours. Patient is in stable condition and has no questions or concerns. Patient will be discharge to home once transportation secured . Prescriptions sent to pharmacy.  CM/SW to assist with discharge planning.     Vitals:    03/03/23 0703 03/03/23 0846 03/03/23 1050 03/03/23 1258   BP:   (!) 147/70    BP Location:       Patient Position:   Lying    Pulse:  92 104 99   Resp:  16 18 16   Temp:   97.6 °F (36.4 °C)    TempSrc:   Oral    SpO2: (!) 92% 98% (!) 91% 98%   Weight:       Height:                Goals of Care Treatment Preferences:  Code Status: Full Code          What is most important right now is to focus on spending time at home, avoiding the hospital, remaining as independent as possible, symptom/pain control, quality of life, even if it means sacrificing a little time.  Accordingly, we have decided that the best plan to meet the patient's goals includes enrolling in hospice care.      Consults:   Consults (From admission, onward)        Status Ordering Provider     Inpatient consult to Palliative Care  Once        Provider:  Eli Arango NP    Completed SHLOMO ZAMUDIO-DEVENDRA T.          Pulmonary  * Acute hypoxemic respiratory failure  Patient with Hypoxic Respiratory failure which is Acute on chronic.  he is on home oxygen at 2 LPM. Supplemental oxygen was provided and noted-  .   Signs/symptoms of respiratory failure include- increased work of breathing and respiratory distress. Contributing diagnoses includes - COPD Labs and images were reviewed. Patient Has not had a recent ABG. Will treat underlying causes and adjust management  of respiratory failure as follows- See plan for COPD    -likely secondary to COPD exacerbation in patient with history of lung cancer with metastatic disease.  -see COPD plan  -wean O2 as tolerated, on 5L    Pleural effusion  -CXR reviewed in personally interpreted.  Appears to have bilateral pleural effusion, left greater than right  -Consider pulmonary consult for thoracentesis if hypoxia is not improving      COPD exacerbation  -Admitted with shortness of breath, cough, productive sputum  With hypoxic respiratory failure requiring ventimask at 15L   -CXR with Small  right and moderate left pleural effusions with underlying airspace disease and/or atelectasis not excluded.  Patchy airspace disease throughout the left lung.  Persistent masslike opacity in the left suprahilar region with adjacent atelectasis or scarring  -Started COPD pathway  -Start prednisone 40mg daily x5 days, nebs Q4 scheduled, doxycycline x5 days  -SpO2 goal 88-92%  -Wean O2 as tolerated    Cardiac/Vascular  PAD (peripheral artery disease)  Continue home medications:  cilostazol, aspirin, statin      Coronary artery disease involving native coronary artery of native heart without angina pectoris  Continue home medications:  Atorvastatin, Plavix, metoprolol      Essential hypertension  Continue home medications with hold parameters:  Amlodipine and metoprolol      Persistent atrial fibrillation  [id number] Score: 3.   Continue metoprolol and Eliquis    Chronic diastolic heart failure  Doubt exacerbation   Results for orders placed during the hospital encounter of 09/28/22    Echo    Interpretation Summary  · The left ventricle is normal in size with concentric hypertrophy and normal systolic function.  · The estimated ejection fraction is 60%.  · Normal left ventricular diastolic function.  · Mild right ventricular enlargement with mildly reduced right ventricular systolic function.  · Mild left atrial enlargement.  · Mild right atrial  enlargement.  · Normal central venous pressure (3 mmHg).  · The estimated PA systolic pressure is 40 mmHg.  · Trivial posterior pericardial effusion.    BNP  Recent Labs   Lab 02/28/23  1657   *       Recent Labs   Lab 02/28/23  1657   TROPONINI 0.031*       -CXR as above  -Last TTE as above  -Resume home lasix  -Monitor Is/Os  -Doubt exacerbation at this time       Oncology  Lung cancer, main bronchus, left  -Outpatient follow-up with Oncology  -currently on palliative treatment   -palliative care team consulted      Palliative Care  Advanced care planning/counseling discussion  Advance Care Planning     Date: 03/01/2023    Code Status  In light of the patients advanced and life limiting illness,I engaged the the patient in a conversation about the patient's preferences for care  at the very end of life. The patient wishes to have CPR and other invasive treatments performed when his heart and/or breathing stops. I communicated to the patient that his wishes currently align with full code status. Her verbalized understanding and stated to defer further conversation with his spouse. Discussed with him that palliative team will be consulted. He is agreeable to discuss with palliative care team as well.   I spent a total of 17 minutes engaging the patient in this advance care planning discussion.         Code Status: Full Code  Palliative care team consulted   Pt qualifies for hospice given End stage COPD      Final Active Diagnoses:    Diagnosis Date Noted POA    PRINCIPAL PROBLEM:  Acute hypoxemic respiratory failure [J96.01] 09/05/2022 Yes    COPD exacerbation [J44.1] 09/05/2022 Yes    Chronic diastolic heart failure [I50.32] 02/23/2022 Yes    Persistent atrial fibrillation [I48.19] 09/30/2022 Yes    Essential hypertension [I10]  Yes     Chronic    Coronary artery disease involving native coronary artery of native heart without angina pectoris [I25.10] 03/14/2022 Yes     Chronic    Pleural effusion  [J90] 12/18/2022 Yes    Lung cancer, main bronchus, left [C34.02] 09/10/2019 Yes     Chronic    PAD (peripheral artery disease) [I73.9] 03/14/2022 Yes    Advanced care planning/counseling discussion [Z71.89] 10/03/2022 Not Applicable      Problems Resolved During this Admission:       Discharged Condition: stable    Disposition: Home or Self Care    Follow Up:   Follow-up Information     Eduardo Ruiz MD Follow up in 1 week(s).    Specialty: Family Medicine  Contact information:  [address] [facility]  [place] [address]  [phone]             Guerline Higgins MD Follow up in 1 week(s).    Specialty: Hematology and Oncology  Contact information:  [address]  [address]  [facility] [id number] [phone]             [facility]. Call.    Specialty: Hospice and Palliative Medicine  Contact information:  [address]  [address]  [place] [id number] [phone]                       Patient Instructions:      Ambulatory referral/consult to HOME Palliative Care   Standing Status: Future   Referral Priority: Routine Referral Type: Consultation   Referred to Provider: [facility] Requested Specialty: Hospice and Palliative Medicine   Number of Visits Requested: 1     Diet Cardiac     Notify your health care provider if you experience any of the following:  temperature >100.4     Notify your health care provider if you experience any of the following:  persistent dizziness, light-headedness, or visual disturbances     Notify your health care provider if you experience any of the following:  increased confusion or weakness     Notify your health care provider if you experience any of the following:  difficulty breathing or increased cough     Activity as tolerated       Significant Diagnostic Studies: Labs: All labs within the past 24 hours have been reviewed       Recent Results (from the past 100 hour(s))   COMPREHENSIVE METABOLIC  PANEL    Collection Time: 02/27/23  2:30 PM   Result Value Ref Range    Sodium 140 136 - 145 mmol/L    Potassium 3.6 3.5 - 5.1 mmol/L    Chloride 103 95 - 110 mmol/L    CO2 25 23 - 29 mmol/L    Glucose 117 (H) 70 - 110 mg/dL    BUN 11 8 - 23 mg/dL    Creatinine 0.9 0.5 - 1.4 mg/dL    Calcium 9.4 8.7 - 10.5 mg/dL    Total Protein 7.4 6.0 - 8.4 g/dL    Albumin 3.6 3.5 - 5.2 g/dL    Total Bilirubin 0.8 0.1 - 1.0 mg/dL    Alkaline Phosphatase 125 55 - 135 U/L    AST 16 10 - 40 U/L    ALT 12 10 - 44 U/L    Anion Gap 12 8 - 16 mmol/L    eGFR >60 >60 mL/min/1.73 m^2   CBC W/ AUTO DIFFERENTIAL    Collection Time: 02/27/23  2:30 PM   Result Value Ref Range    WBC 8.50 3.90 - 12.70 K/uL    RBC 5.05 4.60 - 6.20 M/uL    Hemoglobin 14.1 14.0 - 18.0 g/dL    Hematocrit 43.5 40.0 - 54.0 %    MCV 86 82 - 98 fL    MCH 27.9 27.0 - 31.0 pg    MCHC 32.4 32.0 - 36.0 g/dL    RDW 15.7 (H) 11.5 - 14.5 %    Platelets 288 150 - 450 K/uL    MPV 9.1 (L) 9.2 - 12.9 fL    Immature Granulocytes 0.2 0.0 - 0.5 %    Gran # (ANC) 5.9 1.8 - 7.7 K/uL    Immature Grans (Abs) 0.02 0.00 - 0.04 K/uL    Lymph # 1.2 1.0 - 4.8 K/uL    Mono # 1.2 (H) 0.3 - 1.0 K/uL    Eos # 0.2 0.0 - 0.5 K/uL    Baso # 0.10 0.00 - 0.20 K/uL    nRBC 0 0 /100 WBC    Gran % 68.8 38.0 - 73.0 %    Lymph % 13.9 (L) 18.0 - 48.0 %    Mono % 13.8 4.0 - 15.0 %    Eosinophil % 2.1 0.0 - 8.0 %    Basophil % 1.2 0.0 - 1.9 %    Differential Method Automated    Magnesium    Collection Time: 02/27/23  2:30 PM   Result Value Ref Range    Magnesium 1.7 1.6 - 2.6 mg/dL   Brain natriuretic peptide    Collection Time: 02/28/23  4:57 PM   Result Value Ref Range     (H) 0 - 99 pg/mL   CBC auto differential    Collection Time: 02/28/23  4:57 PM   Result Value Ref Range    WBC 9.25 3.90 - 12.70 K/uL    RBC 4.92 4.60 - 6.20 M/uL    Hemoglobin 14.4 14.0 - 18.0 g/dL    Hematocrit 42.5 40.0 - 54.0 %    MCV 86 82 - 98 fL    MCH 29.3 27.0 - 31.0 pg    MCHC 33.9 32.0 - 36.0 g/dL    RDW 15.5 (H) 11.5 -  14.5 %    Platelets 323 150 - 450 K/uL    MPV 9.9 9.2 - 12.9 fL    Immature Granulocytes 0.4 0.0 - 0.5 %    Gran # (ANC) 6.7 1.8 - 7.7 K/uL    Immature Grans (Abs) 0.04 0.00 - 0.04 K/uL    Lymph # 1.1 1.0 - 4.8 K/uL    Mono # 1.1 (H) 0.3 - 1.0 K/uL    Eos # 0.2 0.0 - 0.5 K/uL    Baso # 0.09 0.00 - 0.20 K/uL    nRBC 0 0 /100 WBC    Gran % 71.9 38.0 - 73.0 %    Lymph % 12.3 (L) 18.0 - 48.0 %    Mono % 12.2 4.0 - 15.0 %    Eosinophil % 2.2 0.0 - 8.0 %    Basophil % 1.0 0.0 - 1.9 %    Differential Method Automated    Comprehensive metabolic panel    Collection Time: 02/28/23  4:57 PM   Result Value Ref Range    Sodium 133 (L) 136 - 145 mmol/L    Potassium 3.6 3.5 - 5.1 mmol/L    Chloride 100 95 - 110 mmol/L    CO2 23 23 - 29 mmol/L    Glucose 126 (H) 70 - 110 mg/dL    BUN 12 8 - 23 mg/dL    Creatinine 0.9 0.5 - 1.4 mg/dL    Calcium 9.7 8.7 - 10.5 mg/dL    Total Protein 7.4 6.0 - 8.4 g/dL    Albumin 3.5 3.5 - 5.2 g/dL    Total Bilirubin 0.7 0.1 - 1.0 mg/dL    Alkaline Phosphatase 119 55 - 135 U/L    AST 21 10 - 40 U/L    ALT 11 10 - 44 U/L    Anion Gap 10 8 - 16 mmol/L    eGFR >60 >60 mL/min/1.73 m^2   Troponin I    Collection Time: 02/28/23  4:57 PM   Result Value Ref Range    Troponin I 0.031 (H) 0.000 - 0.026 ng/mL   POCT Influenza A/B Molecular    Collection Time: 02/28/23  5:51 PM   Result Value Ref Range    POC Molecular Influenza A Ag Negative Negative, Not Reported    POC Molecular Influenza B Ag Negative Negative, Not Reported     Acceptable Yes    POCT COVID-19 Rapid Screening    Collection Time: 02/28/23  5:52 PM   Result Value Ref Range    POC Rapid COVID Negative Negative     Acceptable Yes    POCT glucose    Collection Time: 02/28/23  9:41 PM   Result Value Ref Range    POCT Glucose 168 (H) 70 - 110 mg/dL   Hepatic Function Panel    Collection Time: 03/01/23  5:36 AM   Result Value Ref Range    Total Protein 6.8 6.0 - 8.4 g/dL    Albumin 3.3 (L) 3.5 - 5.2 g/dL    Total  Bilirubin 0.6 0.1 - 1.0 mg/dL    Bilirubin, Direct 0.3 0.1 - 0.3 mg/dL    AST 12 10 - 40 U/L    ALT 11 10 - 44 U/L    Alkaline Phosphatase 118 55 - 135 U/L   Protime-INR    Collection Time: 03/01/23  5:36 AM   Result Value Ref Range    Prothrombin Time 12.2 9.0 - 12.5 sec    INR 1.2 0.8 - 1.2   Phosphorus    Collection Time: 03/01/23  5:36 AM   Result Value Ref Range    Phosphorus 4.0 2.7 - 4.5 mg/dL   Magnesium    Collection Time: 03/01/23  5:36 AM   Result Value Ref Range    Magnesium 1.7 1.6 - 2.6 mg/dL   Basic Metabolic Panel    Collection Time: 03/01/23  5:36 AM   Result Value Ref Range    Sodium 137 136 - 145 mmol/L    Potassium 3.8 3.5 - 5.1 mmol/L    Chloride 101 95 - 110 mmol/L    CO2 23 23 - 29 mmol/L    Glucose 195 (H) 70 - 110 mg/dL    BUN 14 8 - 23 mg/dL    Creatinine 0.9 0.5 - 1.4 mg/dL    Calcium 9.3 8.7 - 10.5 mg/dL    Anion Gap 13 8 - 16 mmol/L    eGFR >60 >60 mL/min/1.73 m^2   CBC Auto Differential    Collection Time: 03/01/23  5:36 AM   Result Value Ref Range    WBC 6.36 3.90 - 12.70 K/uL    RBC 4.99 4.60 - 6.20 M/uL    Hemoglobin 14.1 14.0 - 18.0 g/dL    Hematocrit 42.8 40.0 - 54.0 %    MCV 86 82 - 98 fL    MCH 28.3 27.0 - 31.0 pg    MCHC 32.9 32.0 - 36.0 g/dL    RDW 15.3 (H) 11.5 - 14.5 %    Platelets 301 150 - 450 K/uL    MPV 9.4 9.2 - 12.9 fL    Immature Granulocytes 0.3 0.0 - 0.5 %    Gran # (ANC) 5.8 1.8 - 7.7 K/uL    Immature Grans (Abs) 0.02 0.00 - 0.04 K/uL    Lymph # 0.5 (L) 1.0 - 4.8 K/uL    Mono # 0.1 (L) 0.3 - 1.0 K/uL    Eos # 0.0 0.0 - 0.5 K/uL    Baso # 0.01 0.00 - 0.20 K/uL    nRBC 0 0 /100 WBC    Gran % 90.5 (H) 38.0 - 73.0 %    Lymph % 7.9 (L) 18.0 - 48.0 %    Mono % 1.1 (L) 4.0 - 15.0 %    Eosinophil % 0.0 0.0 - 8.0 %    Basophil % 0.2 0.0 - 1.9 %    Differential Method Automated    POCT glucose    Collection Time: 03/01/23  6:53 AM   Result Value Ref Range    POCT Glucose 157 (H) 70 - 110 mg/dL   POCT glucose    Collection Time: 03/01/23 10:59 AM   Result Value Ref Range     POCT Glucose 229 (H) 70 - 110 mg/dL   POCT glucose    Collection Time: 03/01/23  4:03 PM   Result Value Ref Range    POCT Glucose 318 (H) 70 - 110 mg/dL   POCT glucose    Collection Time: 03/01/23  8:20 PM   Result Value Ref Range    POCT Glucose 152 (H) 70 - 110 mg/dL   POCT glucose    Collection Time: 03/02/23  7:00 AM   Result Value Ref Range    POCT Glucose 155 (H) 70 - 110 mg/dL   POCT glucose    Collection Time: 03/02/23 11:11 AM   Result Value Ref Range    POCT Glucose 166 (H) 70 - 110 mg/dL   POCT glucose    Collection Time: 03/02/23  3:48 PM   Result Value Ref Range    POCT Glucose 202 (H) 70 - 110 mg/dL   POCT glucose    Collection Time: 03/02/23  8:06 PM   Result Value Ref Range    POCT Glucose 204 (H) 70 - 110 mg/dL   POCT glucose    Collection Time: 03/03/23  7:03 AM   Result Value Ref Range    POCT Glucose 135 (H) 70 - 110 mg/dL   POCT glucose    Collection Time: 03/03/23 10:47 AM   Result Value Ref Range    POCT Glucose 147 (H) 70 - 110 mg/dL   POCT glucose    Collection Time: 03/03/23  3:46 PM   Result Value Ref Range    POCT Glucose 226 (H) 70 - 110 mg/dL       Microbiology Results (last 7 days)     ** No results found for the last 168 hours. **          Imaging Results          X-Ray Chest AP Portable (Final result)  Result time 02/28/23 17:55:48    Final result by Martha Wild MD (02/28/23 17:55:48)                 Impression:      Findings, as above.      Electronically signed by: Martha Wild  Date:    02/28/2023  Time:    17:55             Narrative:    EXAMINATION:  XR CHEST AP PORTABLE    CLINICAL HISTORY:  hypoxia;    TECHNIQUE:  Single frontal view of the chest was performed.    COMPARISON:  01/28/2023    FINDINGS:  Right chest wall port.  Small  right and moderate left pleural effusions with underlying airspace disease and/or atelectasis not excluded.  Patchy airspace disease throughout the left lung.  Persistent masslike opacity in the left suprahilar region with adjacent  atelectasis or scarring.  Cardiac silhouette is obscured.                                    Pending Diagnostic Studies:     None         Medications:  Reconciled Home Medications:      Medication List      START taking these medications    doxycycline 100 MG tablet  Commonly known as: VIBRA-TABS  Take 1 tablet (100 mg total) by mouth every 12 (twelve) hours. for 2 days     HYDROcodone-acetaminophen 5-325 mg per tablet  Commonly known as: NORCO  Take 1 tablet by mouth every 8 (eight) hours as needed for Pain.     predniSONE 20 MG tablet  Commonly known as: DELTASONE  Take 2 tablets (40 mg total) by mouth once daily. for 2 days  Start taking on: March 4, 2023        CONTINUE taking these medications    albuterol 90 mcg/actuation inhaler  Commonly known as: VENTOLIN HFA  Inhale 2 puffs into the lungs every 6 (six) hours as needed for Wheezing. Rescue     albuterol-ipratropium 2.5 mg-0.5 mg/3 mL nebulizer solution  Commonly known as: DUO-NEB  Inhale contents of 1 vial (3 mLs) by nebulization every 4 (four) hours as needed for Wheezing or Shortness of Breath. Rescue     amLODIPine 10 MG tablet  Commonly known as: NORVASC  Take 1 tablet (10 mg total) by mouth once daily.     apixaban 5 mg Tab  Commonly known as: ELIQUIS  Take 1 tablet (5 mg total) by mouth 2 (two) times daily.     aspirin 81 MG Chew  Take 1 tablet (81 mg total) by mouth once daily.     atorvastatin 80 MG tablet  Commonly known as: LIPITOR  Take 1 tablet (80 mg total) by mouth once daily.     cilostazoL 100 MG Tab  Commonly known as: PLETAL  Take 1 tablet (100 mg total) by mouth 2 (two) times daily.     GAVILAX 17 gram/dose powder  Generic drug: polyethylene glycol  Mix 1 capful (17 grams total) with a liquid and take by mouth once daily.     metoprolol tartrate 50 MG tablet  Commonly known as: LOPRESSOR  Take 1 tablet (50 mg total) by mouth 3 (three) times daily.     SPIRIVA WITH HANDIHALER 18 mcg inhalation capsule  Generic drug: tiotropium  Inhale 1  capsule (18 mcg total) into the lungs via handihaler once daily. Controller     VITAMIN C ENERGY BOOSTER 1,000 mg Pwep  Generic drug: ascorbic acid-multivit-min  Take 3,000 mg by mouth once daily. Patient takes 3,000 mg per day     WIXELA INHUB 100-50 mcg/dose diskus inhaler  Generic drug: fluticasone-salmeterol 100-50 mcg/dose  Inhale 1 puff into the lungs 2 (two) times daily. Controller        ASK your doctor about these medications    furosemide 40 MG tablet  Commonly known as: LASIX  Take 1 tablet (40 mg total) by mouth 2 (two) times a day.            Indwelling Lines/Drains at time of discharge:   Lines/Drains/Airways     Central Venous Catheter Line  Duration           Port A Cath Single Lumen right subclavian -- days                Time spent on the discharge of patient: Greater than 35 minutes         Kayla Coleman DO  Department of Hospital Medicine  [facility] - Med Surg

## 2023-03-03 NOTE — NURSING
[facility] MEWS TRIGGER FOLLOW UP       MEWS Monitoring, Score is: 3  Indication for review: RR    Bedside Nurse, Kera contacted, MD aware/ following, instructed to call [phone] for further concerns or assistance.    Pt had removed NC upon my assessment but was awake and eating candy. Placed pt NC back on him, 5L. Educated patient on importance of wearing O2, expressed understanding. Can state name, year, and location but made some odd statements about needing to stay awake even though it was nighttime. Dru

## 2023-03-04 NOTE — NURSING
Transported downstairs via wheelchair, home oxygen in use,belongings & wife at side, no distress noted, safety maintained.

## 2023-03-04 NOTE — NURSING
Discharge instructions reviewed with Natty Iverson (wife), verbalized understanding, Rx delivered, no concerns voiced at this time, safety maintained.

## 2023-03-08 PROBLEM — T80.1XXA PHLEBITIS AFTER INFUSION: Status: RESOLVED | Noted: 2021-04-30 | Resolved: 2023-01-01

## 2023-03-08 PROBLEM — J18.9 HAP (HOSPITAL-ACQUIRED PNEUMONIA): Status: RESOLVED | Noted: 2019-08-22 | Resolved: 2023-01-01

## 2023-03-08 PROBLEM — I80.9 PHLEBITIS AFTER INFUSION: Status: RESOLVED | Noted: 2021-04-30 | Resolved: 2023-01-01
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
## 2023-03-08 PROBLEM — L03.116 LEFT LEG CELLULITIS: Status: RESOLVED | Noted: 2022-01-01 | Resolved: 2023-01-01

## 2023-03-16 PROBLEM — R65.20 SEVERE SEPSIS: Status: ACTIVE | Noted: 2022-05-17

## 2023-03-16 PROBLEM — R78.81 BACTEREMIA: Status: ACTIVE | Noted: 2023-01-01

## 2023-03-16 PROBLEM — J96.21 ACUTE ON CHRONIC RESPIRATORY FAILURE WITH HYPOXIA: Status: ACTIVE | Noted: 2022-01-01

## 2023-03-16 PROBLEM — J10.1 INFLUENZA A: Status: ACTIVE | Noted: 2023-01-01

## 2023-03-16 PROBLEM — R94.31 PROLONGED Q-T INTERVAL ON ECG: Status: ACTIVE | Noted: 2023-01-01

## 2023-03-16 NOTE — HPI
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     Patient presented with severe respiratory distress that started shortly prior to presentation.  Per his wife, he started having worsening shortness of breath despite receiving DuoNebs and she noted that he was choking.  Despite being on 4 oxygen at home, he was hypoxic to 65%.  Additional symptoms include decreased p.o. intake and diarrhea which improved.    In the ED, the patient was tachycardic (up to 160), tachypneic and hypoxic requiring BiPAP.  Labs were remarkable for leukocytosis (32.7), elevated D-dimer (> 33.0), elevated BNP (272), elevated troponin (0.045), elevated lactic acid (6.7) and positive influenza A.  Chest x-ray showed persistent bilateral airspace and pleural opacities, slightly improved in the left upper lung since prior exam.  Left upper extremity ultrasound showed no thrombus in central veins.  CTA chest showed no PE, interval enlargement of known left supraclavicular mass/adenopathy, new masslike consolidation with air bronchograms in the right middle lobe, likely pneumonic process and new right hilar node measuring 26 x 22 mm..  He was given DuoNebs x3, 1.9 L of LR, 500 mL of NS, vancomycin, Zosyn, and was admitted for further management.

## 2023-03-16 NOTE — ASSESSMENT & PLAN NOTE
Stage IV. Follows w/ Dr. Higgins. Most recent clinic visit on 2/28. Discussion at that time was that patient's cancer is progressing despite therapies and in the setting of recurrent admissions and declining respiratory status, hospice was recommended

## 2023-03-16 NOTE — RESPIRATORY THERAPY
Patient transported to CT SCAN via 100 % non-rebreather. Returned to ER 17 and placed back on vapotherm 35 L, fi02 100%.

## 2023-03-16 NOTE — ASSESSMENT & PLAN NOTE
Patient with Hypoxic Respiratory failure which is Acute on chronic.  he is on home oxygen at 4 LPM. Supplemental oxygen was provided and noted- Oxygen Concentration (%):  [] 100.   Signs/symptoms of respiratory failure include- increased work of breathing and respiratory distress. Contributing diagnoses includes - Pneumonia and influenza Labs and images were reviewed. Patient Has recent ABG, which has been reviewed. Will treat underlying causes and adjust management of respiratory failure as follows- See plan for HAP/Influenza

## 2023-03-16 NOTE — ED NOTES
Patient in tripod position while on BiPAP. Patient appears anxious. MD at bedside to reassess patient.

## 2023-03-16 NOTE — CARE UPDATE
Assumed care of Mr Kashif Iverson admitted with severe sepsis causing acute on chronic hypoxic respiratory failure secondary to influenza A pneumonia and bacterial pneumonia. Currently on vapotherm 25/70 with SpO2 goal 88-92%. On vanc, zosyn, tamiflu for pneumonia. Sputum culture pending collection. Blood cultures with GNR; repeat in process. Appears euvolemic. Does have LUE swelling; US negative for DVT. Mental status improving. Pulmonary, Palliative consulted.     Jennifer Singleton MD  03/16/2023  1:07 PM

## 2023-03-16 NOTE — CONSULTS
[facility] - Intensive Care  Pulmonology  Consult Note    Patient Name: Kashif Iverson  MRN: [id number]  Admission Date: 3/15/2023  Hospital Length of Stay: 0 days  Code Status: Full Code  Attending Physician: Jennifer Singleton MD  Primary Care Provider: Eduardo Riuz MD   Principal Problem: Acute on chronic respiratory failure with hypoxia    [unfilled]  Subjective:     HPI:  63 yo w/ a h/o stage IV SCC of the lung dx in 2019 following w/ Dr. Higgins w/ most recent visit on 2/28 recommendation that patient pursue hospice given progression of disease and no further treatment options avaiable, end-stage COPD on 4L of oxygen, HFpEF, CAD who presented on 3/16 w/ acute on chronic hypoxemic RF 2/2 flu A PNA.     Admitted to the ICU requiring max dose vapotherm 40/100. Started on Tamiflu as well as BSAbx. CTA Chest w/o a clot but did show a dense consolidation in the RML.     Currently, he is on 20/50. States he feels better. Does have a productive cough. History is difficult to obtain and patient is not specific. Reports his son was recently sick just before him. Does not recall how long ago that was. Reports increased cough, sputum production, f/c/ns. Denies chest pain, LE edema, orthopnea, rashes.            Past Medical History:   Diagnosis Date    Chronic obstructive pulmonary disease with acute exacerbation 9/5/2022    Combined systolic and diastolic heart failure     Dependence on supplemental oxygen 5/24/2022    History of completed stroke 9/3/2019     09/03/2019 On CT exam    History of non-ST elevation myocardial infarction (NSTEMI) 2/22/2022    Hypertension     Lung cancer     NSCLC    Lung mass     left lung    Macrocytic anemia 8/24/2019    PAD (peripheral artery disease) 3/14/2022    LUIS FELIPE 3/11/22    Peripheral artery disease        Past Surgical History:   Procedure Laterality Date    BRONCHOSCOPY N/A 08/30/2019    Procedure: Bronchoscopy;  Surgeon: [facility] Provider;  Location: [facility] OR [address] [place] [address];   Service: Anesthesiology;  Laterality: N/A;    CORONARY ANGIOGRAPHY N/A 03/04/2022    Procedure: ANGIOGRAM, CORONARY ARTERY;  Surgeon: Robson Green MD;  Location: Health system CATH LAB;  Service: Cardiology;  Laterality: N/A;    INSERTION OF TUNNELED CENTRAL VENOUS CATHETER (CVC) WITH SUBCUTANEOUS PORT         Review of patient's allergies indicates:  No Known Allergies    Family History       Problem Relation (Age of Onset)    Cancer Father, Sister    Diabetes Mother    Heart defect Mother    No Known Problems Son          Tobacco Use    Smoking status: Former     Packs/day: 1.00     Years: 25.00     Pack years: 25.00     Types: Cigarettes     Quit date: 2020     Years since quitting: 3.2    Smokeless tobacco: Never   Substance and Sexual Activity    Alcohol use: Yes     Comment: 11/3/22: Occ.    Drug use: Never    Sexual activity: Yes     Partners: Female         Review of Systems  As above    Objective:     Vital Signs (Most Recent):  Temp: 98.2 °F (36.8 °C) (03/16/23 1101)  Pulse: 81 (03/16/23 1400)  Resp: (!) 26 (03/16/23 1400)  BP: (!) 97/59 (03/16/23 1400)  SpO2: (!) 93 % (03/16/23 1400)   Vital Signs (24h Range):  Temp:  [97.7 °F (36.5 °C)-100.4 °F (38 °C)] 98.2 °F (36.8 °C)  Pulse:  [] 81  Resp:  [] 26  SpO2:  [76 %-100 %] 93 %  BP: ()/() 97/59     Weight: 78.5 kg (173 lb)  Body mass index is 24.82 kg/m².      Intake/Output Summary (Last 24 hours) at 3/16/2023 1437  Last data filed at 3/16/2023 1400  Gross per 24 hour   Intake 2184.92 ml   Output 1300 ml   Net 884.92 ml       Physical Exam  Vitals and nursing note reviewed.   Constitutional:       Appearance: He is well-developed.   HENT:      Head: Normocephalic and atraumatic.   Eyes:      General: No scleral icterus.     Extraocular Movements: Extraocular movements intact.   Neck:      Vascular: No JVD.      Trachea: No tracheal deviation.   Cardiovascular:      Rate and Rhythm: Normal rate and regular rhythm.      Heart sounds:  Normal heart sounds. No murmur heard.    No friction rub. No gallop.   Pulmonary:      Effort: Pulmonary effort is normal.      Breath sounds: No stridor. Rales present. No wheezing.   Abdominal:      General: Bowel sounds are normal. There is no distension.      Palpations: Abdomen is soft.   Musculoskeletal:         General: Normal range of motion.      Cervical back: Normal range of motion.   Skin:     General: Skin is warm and dry.      Capillary Refill: Capillary refill takes less than 2 seconds.      Findings: No erythema or rash.   Neurological:      General: No focal deficit present.      Mental Status: He is alert and oriented to person, place, and time.   Psychiatric:         Mood and Affect: Mood normal.         Behavior: Behavior normal.       Vents:  Oxygen Concentration (%): 70 (03/16/23 1101)    Lines/Drains/Airways       Central Venous Catheter Line  Duration             Port A Cath Single Lumen right subclavian -- days              Peripheral Intravenous Line  Duration                  Peripheral IV - Single Lumen 03/15/23 2207 20 G Anterior;Distal;Right Wrist <1 day         Peripheral IV - Single Lumen 03/15/23 2238 20 G Posterior;Right Hand <1 day         Peripheral IV - Single Lumen 03/15/23 2328 20 G Anterior;Proximal;Right Forearm <1 day                    Significant Labs:    CBC/Anemia Profile:  Recent Labs   Lab 03/15/23  2212 03/15/23  2243 03/16/23  0414   WBC 32.75*  --  29.78*   HGB 15.6  --  13.3*   HCT 44.8 51 39.5*     --  223   MCV 82  --  82   RDW 15.7*  --  15.6*        Chemistries:  Recent Labs   Lab 03/15/23  2329 03/16/23  0414    134*   K 2.9* 2.9*    97   CO2 18* 23   BUN 15 15   CREATININE 1.1 1.1   CALCIUM 7.3* 7.8*   ALBUMIN 2.6* 2.6*   PROT 5.9* 5.8*   BILITOT 1.2* 1.1*   ALKPHOS 111 102   ALT 12 12   AST 14 12   MG 1.2* 1.9   PHOS  --  2.7       All pertinent labs within the past 24 hours have been reviewed.    Significant Imaging:   I have reviewed all  pertinent imaging results/findings within the past 24 hours.      ABG  Recent Labs   Lab 03/15/23  2302   PH 7.468*   PO2 68*   PCO2 28.2*   HCO3 20.4*   BE -2     Assessment/Plan:     Pulmonary  * Acute on chronic respiratory failure with hypoxia  Baseline 4L of oxygen at home 2/2 COPD/Bronchiectasis. Acute worsening 2/2 RML PNA d/t Flu A with possible secondary bacterial infection.     He reports that is it not on maintenance inhaler therapy. Simply takes nebs at home for his COPD.    - sputum culture pending  - Agree w/ tamiflu and BSAbx  - Duonebs scheduled  - Oxygenation significantly improved. Goal sat >88%  - Will need to start on triple therapy inhaler (Breztri or Trelegy) after he recovers from this PNA  - Agree w/ Palliative involvement    COPD (chronic obstructive pulmonary disease)  Per acute on chronic hypoxemic RF    Oncology  Lung cancer, main bronchus, left  Stage IV. Follows w/ Dr. Higgins. Most recent clinic visit on 2/28. Discussion at that time was that patient's cancer is progressing despite therapies and in the setting of recurrent admissions and declining respiratory status, hospice was recommended          Thank you for your consult. I will sign off. Please contact us if you have any additional questions.     John Norton MD  Pulmonology  [facility] - Intensive Care

## 2023-03-16 NOTE — ED NOTES
Patient lying on side in semi fowlers position at this time. Appears to be resting comfortably. Respiratory rate and breathing pattern has significantly improved. Wife at bedside. Care ongoing.

## 2023-03-16 NOTE — ED NOTES
Pt incontinent of urine. Perineal care given. Linens changed. Pt placed in semi fowlers position and turned twice. Was in this position for approximately 2 minutes. Afterwards, patient placed self in tripod position and appeared distressed. Reminded patient to have mindful breathing and to slow down respiratory rate. MD notified.

## 2023-03-16 NOTE — ED NOTES
Patient presents to ED secondary to respiratory distress. Patient has history of lung cancer and COPD and currently uses 5L NC at home. Upon arrival, patient is tachypneic and distressed. Skin color is cyanotic and mottled. Pt immediately brought to room 17. MD and multiple RN's at bedside. Patient placed on cardiac monitor, automatic blood pressure cuff and pulse oximeter. Patient placed on 15L non re breather. Patient unable to speak secondary to respiratory distress at this time.

## 2023-03-16 NOTE — SUBJECTIVE & OBJECTIVE
Past Medical History:   Diagnosis Date    Chronic obstructive pulmonary disease with acute exacerbation 9/5/2022    Combined systolic and diastolic heart failure     Dependence on supplemental oxygen 5/24/2022    History of completed stroke 9/3/2019     09/03/2019 On CT exam    History of non-ST elevation myocardial infarction (NSTEMI) 2/22/2022    Hypertension     Lung cancer     NSCLC    Lung mass     left lung    Macrocytic anemia 8/24/2019    PAD (peripheral artery disease) 3/14/2022    LUIS FELIPE 3/11/22    Peripheral artery disease        Past Surgical History:   Procedure Laterality Date    BRONCHOSCOPY N/A 08/30/2019    Procedure: Bronchoscopy;  Surgeon: [facility] Diagnostic Provider;  Location: [facility] OR [address] [place] [address];  Service: Anesthesiology;  Laterality: N/A;    CORONARY ANGIOGRAPHY N/A 03/04/2022    Procedure: ANGIOGRAM, CORONARY ARTERY;  Surgeon: Robson Green MD;  Location: [facility] CATH LAB;  Service: Cardiology;  Laterality: N/A;    INSERTION OF TUNNELED CENTRAL VENOUS CATHETER (CVC) WITH SUBCUTANEOUS PORT         Review of patient's allergies indicates:  No Known Allergies    Medications:  Continuous Infusions:  Scheduled Meds:   albuterol-ipratropium  3 mL Nebulization Q4H    apixaban  5 mg Oral BID    aspirin  81 mg Oral Daily    atorvastatin  80 mg Oral Daily    cilostazoL  100 mg Oral BID    famotidine (PF)  20 mg Intravenous Daily    furosemide  40 mg Oral BID    metoprolol tartrate  50 mg Oral TID    [START ON 3/17/2023] oseltamivir  75 mg Oral Daily    piperacillin-tazobactam (ZOSYN) IVPB  4.5 g Intravenous Q8H    vancomycin (VANCOCIN) IVPB  1,000 mg Intravenous Q12H     PRN Meds:acetaminophen, albuterol-ipratropium, magnesium oxide, magnesium oxide, melatonin, ondansetron, oxyCODONE, potassium bicarbonate, potassium bicarbonate, potassium bicarbonate, potassium, sodium phosphates, potassium, sodium phosphates, potassium, sodium phosphates, prochlorperazine, sodium chloride 0.9%, Pharmacy to dose Vancomycin  consult **AND** vancomycin - pharmacy to dose    Family History       Problem Relation (Age of Onset)    Cancer Father, Sister    Diabetes Mother    Heart defect Mother    No Known Problems Son          Tobacco Use    Smoking status: Former     Packs/day: 1.00     Years: 25.00     Pack years: 25.00     Types: Cigarettes     Quit date: 2020     Years since quitting: 3.2    Smokeless tobacco: Never   Substance and Sexual Activity    Alcohol use: Yes     Comment: 11/3/22: Occ.    Drug use: Never    Sexual activity: Yes     Partners: Female       Review of Systems   Constitutional:  Positive for activity change and fatigue.   Respiratory:  Positive for shortness of breath.    Neurological:  Positive for weakness.   Objective:     Vital Signs (Most Recent):  Temp: 98.2 °F (36.8 °C) (03/16/23 1101)  Pulse: 86 (03/16/23 1234)  Resp: (!) 24 (03/16/23 1234)  BP: 102/65 (03/16/23 1200)  SpO2: (!) 94 % (03/16/23 1234)   Vital Signs (24h Range):  Temp:  [97.7 °F (36.5 °C)-100.4 °F (38 °C)] 98.2 °F (36.8 °C)  Pulse:  [] 86  Resp:  [] 24  SpO2:  [76 %-100 %] 94 %  BP: ()/() 102/65     Weight: 78.5 kg (173 lb)  Body mass index is 24.82 kg/m².    Physical Exam  Vitals reviewed.   Constitutional:       Appearance: He is ill-appearing and toxic-appearing. He is not diaphoretic.      Comments: Chronically ill appearing   HENT:      Head: Normocephalic and atraumatic.      Right Ear: External ear normal.      Left Ear: External ear normal.   Cardiovascular:      Rate and Rhythm: Tachycardia present.   Pulmonary:      Effort: Respiratory distress present.      Comments: Vapotherm 100%  Musculoskeletal:         General: Swelling present.   Skin:     General: Skin is warm and dry.   Neurological:      Mental Status: He is alert.      Motor: Weakness present.      Comments: Some confusion           Advance Care Planning   Advance Care Planning       Significant Labs: All pertinent labs within the past 24 hours have  been reviewed.  CBC:   Recent Labs   Lab 03/16/23  0414   WBC 29.78*   HGB 13.3*   HCT 39.5*   MCV 82        BMP:  Recent Labs   Lab 03/16/23 0414   *   *   K 2.9*   CL 97   CO2 23   BUN 15   CREATININE 1.1   CALCIUM 7.8*   MG 1.9     LFT:  Lab Results   Component Value Date    AST 12 03/16/2023    ALKPHOS 102 03/16/2023    BILITOT 1.1 (H) 03/16/2023     Albumin:   Albumin   Date Value Ref Range Status   03/16/2023 2.6 (L) 3.5 - 5.2 g/dL Final     Protein:   Total Protein   Date Value Ref Range Status   03/16/2023 5.8 (L) 6.0 - 8.4 g/dL Final     Lactic acid:   Lab Results   Component Value Date    LACTATE 3.7 (HH) 03/16/2023    LACTATE 6.7 (HH) 03/15/2023       Significant Imaging: I have reviewed all pertinent imaging results/findings within the past 24 hours. CTA chest, CXR

## 2023-03-16 NOTE — ED PROVIDER NOTES
Encounter Date: 3/15/2023    SCRIBE #1 NOTE: I, Jude Goodman, am scribing for, and in the presence of,  Ilia James MD. I have scribed the following portions of the note - Other sections scribed: HPI, ROS, PE.     History     Chief Complaint   Patient presents with    Shortness of Breath     Patient presents to the ED with reports of having shortness of breath. Patient on 5 liters on nasal cannula from home.      Kashif Iverson is a 62 y.o male with a PMHx of COPD, CHF, HTN, CVA, and NSTEMI, who presents to the ED complaining of shortness of breath beginning today. Patient reports complaints of shortness of breath beginning today. Patient reports he is currently on 5L O2 NC at home. Endorses nebulizer usage at home. Compliant with eliquis and diuretics. No other medications taken PTA. No alleviating or exacerbating factors noted. Denies CP, syncope, abdominal pain, hemoptysis, or other associated symptoms. No known allergies.    The history is provided by the patient. The history is limited by the condition of the patient. No  was used.   Review of patient's allergies indicates:  No Known Allergies  Past Medical History:   Diagnosis Date    Chronic obstructive pulmonary disease with acute exacerbation 9/5/2022    Combined systolic and diastolic heart failure     Dependence on supplemental oxygen 5/24/2022    History of completed stroke 9/3/2019     09/03/2019 On CT exam    History of non-ST elevation myocardial infarction (NSTEMI) 2/22/2022    Hypertension     Lung cancer     NSCLC    Lung mass     left lung    Macrocytic anemia 8/24/2019    PAD (peripheral artery disease) 3/14/2022    LUIS FELIPE 3/11/22    Peripheral artery disease      Past Surgical History:   Procedure Laterality Date    BRONCHOSCOPY N/A 08/30/2019    Procedure: Bronchoscopy;  Surgeon: Miguel Diagnostic Provider;  Location: [facility] OR [address] [place] [address];  Service: Anesthesiology;  Laterality: N/A;    CORONARY ANGIOGRAPHY N/A 03/04/2022     Procedure: ANGIOGRAM, CORONARY ARTERY;  Surgeon: Robson Green MD;  Location: [facility] CATH LAB;  Service: Cardiology;  Laterality: N/A;    INSERTION OF TUNNELED CENTRAL VENOUS CATHETER (CVC) WITH SUBCUTANEOUS PORT       Family History   Problem Relation Age of Onset    Diabetes Mother     Heart defect Mother     Cancer Father     Cancer Sister     No Known Problems Son      Social History     Tobacco Use    Smoking status: Former     Packs/day: 1.00     Years: 25.00     Pack years: 25.00     Types: Cigarettes     Quit date: 2020     Years since quitting: 3.2    Smokeless tobacco: Never   Substance Use Topics    Alcohol use: Yes     Comment: 11/3/22: Occ.    Drug use: Never     Review of Systems   Unable to perform ROS: Severe respiratory distress   Respiratory:  Positive for shortness of breath.    Cardiovascular:  Negative for chest pain.   Gastrointestinal:  Negative for abdominal pain.   Neurological:  Negative for syncope.     Physical Exam     Initial Vitals   BP Pulse Resp Temp SpO2   03/15/23 2204 03/15/23 2155 03/15/23 2155 03/15/23 2231 03/15/23 2155   (!) 158/84 108 (!) 24 (!) 100.4 °F (38 °C) (!) 76 %      MAP       --                Physical Exam    Nursing note and vitals reviewed.  Constitutional: He appears well-developed and well-nourished. He is not diaphoretic. No distress.   HENT:   Head: Normocephalic and atraumatic.   Right Ear: External ear normal.   Left Ear: External ear normal.   Nose: Nose normal.   Eyes: Conjunctivae are normal. No scleral icterus.   Neck: Neck supple. No tracheal deviation present.   Cardiovascular:  Regular rhythm and normal heart sounds.   Tachycardia present.   Exam reveals no gallop and no friction rub.       No murmur heard.  Pulses:       Radial pulses are 2+ on the right side and 2+ on the left side.        Dorsalis pedis pulses are 2+ on the right side and 2+ on the left side.   Pulmonary/Chest: Accessory muscle usage present. Tachypnea noted. He is in respiratory  distress (severe). He has decreased breath sounds. He has rales in the right lower field and the left lower field.   Unable to speak in full sentences.    Abdominal: Abdomen is soft. Bowel sounds are normal. There is no abdominal tenderness.   Musculoskeletal:      Left upper arm: Edema (non-pitting) present.      Cervical back: Neck supple.      Right lower le+ Pitting Edema present.      Left lower le+ Pitting Edema present.     Neurological: He is alert and oriented to person, place, and time. GCS score is 15. GCS eye subscore is 4. GCS verbal subscore is 5. GCS motor subscore is 6.   Skin: Skin is warm and dry.   Psychiatric: He has a normal mood and affect. Thought content normal.       ED Course   Critical Care    Date/Time: 3/16/2023 2:01 AM  Performed by: Ilia James MD  Authorized by: Jennifer Singleton MD   Direct patient critical care time: 45 minutes  Additional history critical care time: 5 minutes  Ordering / reviewing critical care time: 5 minutes  Documentation critical care time: 5 minutes  Consulting other physicians critical care time: 5 minutes  Total critical care time (exclusive of procedural time) : 65 minutes  Critical care time was exclusive of teaching time and separately billable procedures and treating other patients.  Critical care was necessary to treat or prevent imminent or life-threatening deterioration of the following conditions: respiratory failure and sepsis.  Critical care was time spent personally by me on the following activities: review of old charts, re-evaluation of patient's condition, pulse oximetry, ordering and review of radiographic studies, ordering and review of laboratory studies, ordering and performing treatments and interventions, obtaining history from patient or surrogate, examination of patient, evaluation of patient's response to treatment, discussions with primary provider, discussions with consultants, development of treatment plan with  patient or surrogate and ventilator management.      Labs Reviewed   CBC W/ AUTO DIFFERENTIAL - Abnormal; Notable for the following components:       Result Value    WBC 32.75 (*)     RDW 15.7 (*)     Immature Granulocytes 0.8 (*)     Gran # (ANC) 29.0 (*)     Immature Grans (Abs) 0.27 (*)     Mono # 2.2 (*)     Gran % 88.7 (*)     Lymph % 3.5 (*)     All other components within normal limits   B-TYPE NATRIURETIC PEPTIDE - Abnormal; Notable for the following components:     (*)     All other components within normal limits   TROPONIN I - Abnormal; Notable for the following components:    Troponin I 0.045 (*)     All other components within normal limits   D DIMER, QUANTITATIVE - Abnormal; Notable for the following components:    D-Dimer >33.00 (*)     All other components within normal limits   LACTIC ACID, PLASMA - Abnormal; Notable for the following components:    Lactate (Lactic Acid) 6.7 (*)     All other components within normal limits    Narrative:        LACTIC ACID critical result(s) called and verbal readback obtained   from RIKA FAYE by 1 03/15/2023 22:41   COMPREHENSIVE METABOLIC PANEL - Abnormal; Notable for the following components:    Potassium 2.9 (*)     CO2 18 (*)     Glucose 176 (*)     Calcium 7.3 (*)     Total Protein 5.9 (*)     Albumin 2.6 (*)     Total Bilirubin 1.2 (*)     All other components within normal limits    Narrative:     Recoll. [id number] by LB1 at 03/15/2023 23:19, reason: Specimen   hemolyzed   MAGNESIUM - Abnormal; Notable for the following components:    Magnesium 1.2 (*)     All other components within normal limits    Narrative:     Recoll. [id number] by LB1 at 03/15/2023 23:19, reason: Specimen   hemolyzed   PROTIME-INR - Abnormal; Notable for the following components:    Prothrombin Time 12.6 (*)     All other components within normal limits   LACTIC ACID, PLASMA - Abnormal; Notable for the following components:    Lactate (Lactic Acid) 3.7 (*)     All other  components within normal limits    Narrative:        LACTIC ACID critical result(s) called and verbal readback obtained   from BRUCE by LB1 03/16/2023 02:01   POCT INFLUENZA A/B MOLECULAR - Abnormal; Notable for the following components:    POC Molecular Influenza A Ag Positive (*)     All other components within normal limits   ISTAT PROCEDURE - Abnormal; Notable for the following components:    POC Glucose 191 (*)     POC Sodium 131 (*)     POC TCO2 (MEASURED) 20 (*)     POC Anion Gap 18 (*)     POC Ionized Calcium 1.02 (*)     All other components within normal limits   ISTAT PROCEDURE - Abnormal; Notable for the following components:    POC PH 7.468 (*)     POC PCO2 28.2 (*)     POC PO2 68 (*)     POC HCO3 20.4 (*)     POC TCO2 21 (*)     All other components within normal limits   CULTURE, BLOOD   APTT   PROTIME-INR   APTT   URINALYSIS, REFLEX TO URINE CULTURE   SARS-COV-2 RDRP GENE   POCT GLUCOSE MONITORING CONTINUOUS          Imaging Results              CTA Chest Non-Coronary (PE Studies) (Edited Result - FINAL)  Result time 03/16/23 01:35:26      Addendum (preliminary) 1 of 1 by Martha Wild MD (03/16/23 01:35:26)      Additionally: Right lower lobe nodule measures 14 mm, previously 5 mm (see axial image 333 of series 3), concerning for neoplasm.  Innumerable bilateral multifocal rounded subcentimeter ground-glass nodular opacities may be infectious, inflammatory, or neoplastic.  Short interval follow-up advised      Electronically signed by: Martha Wild  Date:    03/16/2023  Time:    01:35                 Final result by Martha Wild MD (03/16/23 01:26:39)                   Impression:      No central or segmental pulmonary embolus.    Interval enlargement of known left supraclavicular mass/adenopathy.    Post therapeutic changes in the left lung with underlying neoplastic process and lymphangitic carcinomatosis not excluded.  Left lower lobe nodule has enlarged.    New masslike  consolidation with air bronchograms in the right middle lobe, likely pneumonic process.  Underlying neoplastic process not excluded.    New right hilar node measuring 26 x 22 mm.      Electronically signed by: Martha Wild  Date:    03/16/2023  Time:    01:26               Narrative:    EXAMINATION:  CTA CHEST NON CORONARY (PE STUDIES)    CLINICAL HISTORY:  Pulmonary embolism (PE) suspected, positive D-dimer;    TECHNIQUE:  Low dose axial images, sagittal and coronal reformations were obtained from the thoracic inlet to the lung bases following the IV administration of 75 mL of Omnipaque 350.  Contrast timing was optimized to evaluate the pulmonary arteries.  MIP images were performed.    COMPARISON:  CT 02/17/2023    FINDINGS:  Left supraclavicular mass extending into the superior mediastinum measures 4.6 x 4.3 cm, slightly increased since prior (previously 4.4 x 4.1 cm).    Emphysematous changes of the lungs.  Redemonstration of wedge-shaped masslike soft tissue density measuring 6.0 x 4.6 cm in anterior left upper lobe extending to the left hilum, similar to prior.  Adjacent linear rounded opacity with pleuroparenchymal scarring in the left upper lobe and lingula.  Left paramedian bronchiectasis, fibrosis, volume loss compatible sequela of post radiation changes.  Multifocal areas of pleural thickening and scarring throughout the left lung.  Mild interlobular septal thickening and adjacent ground-glass opacity in the left lower lobe.  Subpleural left lower lobe nodule measures 8 mm, previously 5 mm (series 3, image 268).  Masslike consolidation with air bronchograms within the right middle lobe, new since prior.  Patchy airspace disease and ground-glass opacity throughout the right lower lobe.  Right pleural effusion has essentially resolved.    No central or segmental pulmonary embolus. Normal heart size. Atherosclerosis of the aorta and its branches, including the coronaries.    New right hilar node  measures 26 x 22 mm.    Bones are intact.  No aggressive osseous lesion.                                       US Upper Extremity Veins Left (Final result)  Result time 03/15/23 23:04:32      Final result by Veda Sullivan MD (03/15/23 23:04:32)                   Impression:      No thrombus in central veins of the left upper extremity.  Subcutaneous edema.      Electronically signed by: Veda Sullivan  Date:    03/15/2023  Time:    23:04               Narrative:    EXAMINATION:  US UPPER EXTREMITY VEINS LEFT    CLINICAL HISTORY:  left arm swellilng;    TECHNIQUE:  Duplex and color flow Doppler evaluation and dynamic compression was performed of the left upper extremity veins.    COMPARISON:  12/19/2022    FINDINGS:  Central veins: The internal jugular, subclavian, and axillary veins are patent and free of thrombus.    Arm veins: The brachial, basilic, and cephalic veins are patent and compressible.    Other findings: Subcutaneous edema of the left forearm.                                       X-Ray Chest 1 View (Final result)  Result time 03/15/23 22:37:36      Final result by Luis Enrique Schroeder MD (03/15/23 22:37:36)                   Impression:      Persistent bilateral airspace and pleural opacities slightly improved in the left upper mid lung since the prior exam.      Electronically signed by: Luis Enrique Schroeder  Date:    03/15/2023  Time:    22:37               Narrative:    EXAMINATION:  XR CHEST 1 VIEW    CLINICAL HISTORY:  shortness of breath;    TECHNIQUE:  Single frontal view of the chest was performed.    COMPARISON:  None    FINDINGS:  Cardiac silhouette appears stable in size and configuration.  Bilateral pleural and parenchymal opacities have slightly improved in the right mid lung.  Port-A-Cath remains unchanged.  The bony thorax is intact.                                       Medications   diltiaZEM (CARDIZEM) 5 mg/mL injection (  Not Given 3/15/23 2230)   apixaban tablet 5 mg (has no  administration in time range)   aspirin chewable tablet 81 mg (has no administration in time range)   atorvastatin tablet 80 mg (has no administration in time range)   cilostazoL tablet 100 mg (has no administration in time range)   furosemide tablet 40 mg (0 mg Oral Hold 3/16/23 0230)   metoprolol tartrate (LOPRESSOR) tablet 50 mg (has no administration in time range)   oxyCODONE immediate release tablet 10 mg (has no administration in time range)   sodium chloride 0.9% flush 10 mL (has no administration in time range)   acetaminophen tablet 650 mg (has no administration in time range)   famotidine (PF) injection 20 mg (has no administration in time range)   ondansetron injection 4 mg (has no administration in time range)   prochlorperazine injection Soln 5 mg (has no administration in time range)   melatonin tablet 6 mg (has no administration in time range)   albuterol-ipratropium 2.5 mg-0.5 mg/3 mL nebulizer solution 3 mL (has no administration in time range)   potassium bicarbonate disintegrating tablet 50 mEq (has no administration in time range)   potassium bicarbonate disintegrating tablet 35 mEq (has no administration in time range)   potassium bicarbonate disintegrating tablet 60 mEq (has no administration in time range)   magnesium oxide tablet 800 mg (has no administration in time range)   magnesium oxide tablet 800 mg (has no administration in time range)   potassium, sodium phosphates 280-160-250 mg packet 2 packet (has no administration in time range)   potassium, sodium phosphates 280-160-250 mg packet 2 packet (has no administration in time range)   potassium, sodium phosphates 280-160-250 mg packet 2 packet (has no administration in time range)   oseltamivir capsule 75 mg (has no administration in time range)   piperacillin-tazobactam (ZOSYN) 4.5 g in dextrose 5 % in water (D5W) 5 % 100 mL IVPB (MB+) (has no administration in time range)   vancomycin - pharmacy to dose (has no administration in time  range)   albuterol-ipratropium 2.5 mg-0.5 mg/3 mL nebulizer solution 3 mL (3 mLs Nebulization Given 3/16/23 0312)   vancomycin (VANCOCIN) 1,000 mg in dextrose 5 % (D5W) 250 mL IVPB (Vial-Mate) (1,000 mg Intravenous Trough Due As Scheduled Before Dose 3/17/23 1030)   albuterol-ipratropium 2.5 mg-0.5 mg/3 mL nebulizer solution 3 mL (3 mLs Nebulization Given 3/15/23 2231)   vancomycin 1.5 g in dextrose 5 % 250 mL IVPB (ready to mix) (0 mg Intravenous Stopped 3/16/23 0101)   sodium chloride 0.9% bolus 500 mL 500 mL (0 mLs Intravenous Stopped 3/15/23 2348)   lactated ringers bolus 1,900 mL ( Intravenous Restarted 3/16/23 0050)   magnesium sulfate 2g in water 50mL IVPB (premix) (0 g Intravenous Stopped 3/16/23 0247)   potassium bicarbonate disintegrating tablet 50 mEq (50 mEq Oral Given 3/16/23 0048)   iohexoL (OMNIPAQUE 350) injection 75 mL (75 mLs Intravenous Given 3/16/23 0038)   oseltamivir capsule 75 mg (75 mg Oral Given 3/16/23 0126)     Medical Decision Making:   History:   Old Medical Records: I decided to obtain old medical records.  Independently Interpreted Test(s):   I have ordered and independently interpreted EKG Reading(s) - see summary below  Clinical Tests:   Lab Tests: Ordered and Reviewed  Radiological Study: Ordered and Reviewed  Medical Tests: Ordered and Reviewed        Scribe Attestation:   Scribe #1: I performed the above scribed service and the documentation accurately describes the services I performed. I attest to the accuracy of the note.      ED Course as of 03/16/23 0621   Wed Mar 15, 2023   2209 Transthoracic echo (TTE) complete  Order# [id number]  Reading physician: Robson Green MD Ordering physician: Robson Green MD Study date: 9/30/22    Reason for Exam  Priority: Routine  Dx: Tachycardia [R00.0 (ICD-10-CM)]    View Images Vital Vitrea     Show images for Echo  Summary    · The left ventricle is normal in size with concentric hypertrophy and normal systolic function.  · The  estimated ejection fraction is 60%.  · Normal left ventricular diastolic function.  · Mild right ventricular enlargement with mildly reduced right ventricular systolic function.  · Mild left atrial enlargement.  · Mild right atrial enlargement.  · Normal central venous pressure (3 mmHg).  · The estimated PA systolic pressure is 40 mmHg.  · Trivial posterior pericardial effusion.   [CC]   2242 Patient with a history of CHF.  Trialing 500 cc of sodium chloride.  Patient tolerates plan to order rest of 30 cc/kg of fluid bolus.  Concern for pneumonia.  Patient also positive for flu A. [CC]   2243 Patient not tolerating BiPAP.  Plan to start on Vapotherm given concern for pneumonia. [CC]   2314 POC Molecular Influenza A Ag(!): Positive [CC]   2315 X-Ray Chest 1 View [CC]   2315 US Upper Extremity Veins Left  Impression:     No thrombus in central veins of the left upper extremity.  Subcutaneous edema. [CC]   2319 D-Dimer(!): >33.00  Given left arm swelling, hypoxia, respiratory distress, significantly elevated D-dimer, will obtain CTA for further evaluation. [CC]   Thu Mar 16, 2023   0128 Repeat EKG:  Rate 107, sinus tachycardia with occasional preventricular complexes, QT C5 31 intervals, other intervals within normal limits.  No ST elevations or depressions noted. [CC]   0620 62-year-old male presenting to the emergency department for evaluation of hypoxia.  Chest x-ray concerning for pneumonia.  CTA was obtain.  CTA also concerning for pneumonia.  Patient treated with empiric antibiotics.  Broad-spectrum.  Patient is not hypercapnic and noted respiratory alkalosis.  He did not tolerate BiPAP.  Place patient on Vapotherm.  Tolerating Vapotherm.  Hypokalemic, hypomagnesemic.  Repleted.  Troponin mildly elevated.  Significantly tachycardic on arrival.  Likely demand ischemia.  Denies chest pain.  EKG nonischemic.  Patient also found to be influenza positive.  Tamiflu ordered.  I have placed admission orders for ICU.  I  spoke with Dr. Marroquin.,    [CC]      ED Course User Index  [CC] Ilia James MD                 Clinical Impression:   Final diagnoses:  [R06.02] Shortness of breath  [R00.0] Tachycardia  [J11.1] Influenza (Primary)  [J96.01] Acute respiratory failure with hypoxia  [A41.9] Sepsis, due to unspecified organism, unspecified whether acute organ dysfunction present  [E83.42] Hypomagnesemia  [E87.6] Hypokalemia  [A41.9] Sepsis        ED Disposition Condition    Admit                I, Ilia James MD, personally performed the services described in this documentation. All medical record entries made by the scribe were at my direction and in my presence. I have reviewed the chart and agree that the record reflects my personal performance and is accurate and complete.      Ilia James MD  03/16/23 0632

## 2023-03-16 NOTE — PLAN OF CARE
Recommendations    1. Monitor PO intake; Add % PO intake for meals to chart.   2. Monitor weight changes; check daily weights.   3. Add ONS Boost Plus TID to increase PO intake.   4. Collaboration with medical providers    Goals: 1. Meet % EEN/EPN by RD follow up.  Nutrition Goal Status: new  Communication of RD Recs: other (comment) (POC)    Assessment and Plan    Nutrition Problem  Inadequate energy intake    Related to (etiology):   Poor PO status    Signs and Symptoms (as evidenced by):   Weight loss of 7% x 2 months    Interventions/Recommendations (treatment strategy):  [employer]- Boost Plus with meals  Monitor PO intake  Collaboration with medical providers    Nutrition Diagnosis Status:   New

## 2023-03-16 NOTE — RESPIRATORY THERAPY
Latest Reference Range & Units 03/15/23 23:02   Sample  ARTERIAL   POC PH 7.35 - 7.45  7.468 (H)   POC PCO2 35 - 45 mmHg 28.2 (LL)   POC PO2 80 - 100 mmHg 68 (L)   POC HCO3 24 - 28 mmol/L 20.4 (L)   POC TCO2 23 - 27 mmol/L 21 (L)   POC SATURATED O2 95 - 100 % 95   Allens Test  N/A   POC BE -2 to 2 mmol/L -2   FiO2  100   Flow  40   DelSys  Nasal Can   Site  RB   Mode  SPONT   (LL): Data is critically low  (H): Data is abnormally high  (L): Data is abnormally low

## 2023-03-16 NOTE — ED NOTES
RRT at bedside to collect ABG. Patient appears to be tolerating vapotherm at this time. Pt is no longer in tripod position. Care ongoing.

## 2023-03-16 NOTE — ASSESSMENT & PLAN NOTE
Baseline 4L of oxygen at home 2/2 COPD/Bronchiectasis. Acute worsening 2/2 RML PNA d/t Flu A with possible secondary bacterial infection.     He reports that is it not on maintenance inhaler therapy. Simply takes nebs at home for his COPD.    - sputum culture pending  - Agree w/ tamiflu and BSAbx  - Duonebs scheduled  - Oxygenation significantly improved. Goal sat >88%  - Will need to start on triple therapy inhaler (Breztri or Trelegy) after he recovers from this PNA  - Agree w/ Palliative involvement

## 2023-03-16 NOTE — PLAN OF CARE
[facility] - Intensive Care  Initial Discharge Assessment    Patient from home and lives with spouse, who will be his help at home. Pt currently has home oxygen concentrator and nebulizer, no current home services. Currently on vapotherm, palliative care consulted. [place] to continue to follow up.        Primary Care Provider: Eduardo Ruiz MD    Admission Diagnosis: Shortness of breath [R06.02]  Hypokalemia [E87.6]  Hypomagnesemia [E83.42]  Tachycardia [R00.0]  Acute respiratory failure with hypoxia [J96.01]  Influenza [J11.1]  Sepsis [A41.9]  Sepsis, due to unspecified organism, unspecified whether acute organ dysfunction present [A41.9]    Admission Date: 3/15/2023  Expected Discharge Date: 3/21/2023    Discharge Barriers Identified: None    Payor: MEDICARE / Plan: MEDICARE PART A & B / Product Type: Government /     Extended Emergency Contact Information  Primary Emergency Contact: Natty Iverson  Mobile Phone: [phone]  Relation: Spouse  Preferred language: English   needed? No  Secondary Emergency Contact: Rolando Iverson  Mobile Phone: [phone]  Relation: Brother  Preferred language: English   needed? No    Discharge Plan A: Home with family  Discharge Plan B: [facility] [id number] [place] [address] AT [address] [address]  Phone: [phone] Fax: [phone]    [facility] [address]  [address] [place] [address]  Phone: [phone] Fax: [phone]      Initial Assessment (most recent)       Adult Discharge Assessment - 03/16/23 1421          Discharge Assessment    Assessment Type Discharge Planning Assessment     Confirmed/corrected address, phone number and insurance Yes     Confirmed Demographics Correct on Facesheet     Source of Information family     If unable to respond/provide information was family/caregiver contacted? Yes     Contact Name/Number Natty Iverson  [phone]     When was your last doctors appointment? --   Last week    Does patient/caregiver understand observation status No     Was observation education provided? No     Communicated [place] with patient/caregiver Date not available/Unable to determine     Reason For Admission Acute on chronic respiratory failure     People in Home spouse     Facility Arrived From: home     Do you expect to return to your current living situation? Yes     Do you have help at home or someone to help you manage your care at home? Yes     Who are your caregiver(s) and their phone number(s)? Spouse Natty Iverson [phone]     Prior to hospitilization cognitive status: Unable to Assess     Current cognitive status: Unable to Assess     Equipment Currently Used at Home other (see comments);nebulizer   Oxygen Concentrator    Readmission within 30 days? Yes     Patient currently being followed by outpatient case management? No     Do you currently have service(s) that help you manage your care at home? No     Do you take prescription medications? Yes     Do you have prescription coverage? Yes     Coverage Medicare     Do you have any problems affording any of your prescribed medications? No     Is the patient taking medications as prescribed? yes     Who is going to help you get home at discharge? Spouse     How do you get to doctors appointments? family or friend will provide     Are you on dialysis? No     Do you take coumadin? No     Discharge Plan A Home with family     Discharge Plan B Home Health     DME Needed Upon Discharge  other (see comments)   TBD    Discharge Plan discussed with: Spouse/sig other     Name(s) and Number(s) Natty Iverson     Discharge Barriers Identified None                     Readmission Assessment (most recent)       Readmission Assessment - 03/16/23 1402          Readmission    Why were you hospitalized in the last 30 days? CAD     Why were you readmitted? New medical problem;Alarmed about signs/symptoms      When you left the hospital how did you feel? Was feeling fine and ready to leave the hospital     When you left the hospital where did you go? Home with Family     Did patient/caregiver refused recommended DC plan? No     Tell me about what happened between when you left the hospital and the day you returned. Discharge home and was feeling well.     When did you start not feeling well? Began with diarrhea two days ago and O2 sat dropped     Did you try to manage your symptoms your self? Yes     What did you do? Breathing treament, but still was not feeling better     Did you call anyone? No     Why? Decided to come to ED     Did you try to see or did see a doctor or nurse before you came? No     Why? Feeling too bad     Did you have  a follow-up appointment on discharge? Yes     Did you go? Yes     Was this a planned readmission? No                         03/16/23 1402   Readmission   Why were you hospitalized in the last 30 days? CAD   Why were you readmitted? New medical problem;Alarmed about signs/symptoms   When you left the hospital how did you feel? Was feeling fine and ready to leave the hospital   When you left the hospital where did you go? Home with Family   Did patient/caregiver refused recommended DC plan? No   Tell me about what happened between when you left the hospital and the day you returned. Discharge home and was feeling well.   When did you start not feeling well? Began with diarrhea two days ago and O2 sat dropped   Did you try to manage your symptoms your self? Yes   What did you do? Breathing treament, but still was not feeling better   Did you call anyone? No   Why? Decided to come to ED   Did you try to see or did see a doctor or nurse before you came? No   Why? Feeling too bad   Did you have  a follow-up appointment on discharge? Yes   Did you go? Yes   Was this a planned readmission? No

## 2023-03-16 NOTE — ASSESSMENT & PLAN NOTE
CTA chest showed no PE, interval enlargement of known left supraclavicular mass/adenopathy, new masslike consolidation with air bronchograms in the right middle lobe, likely pneumonic process and new right hilar node measuring 26 x 22 mm..  Continue vancomycin/Zosyn

## 2023-03-16 NOTE — HPI
61 yo w/ a h/o stage IV SCC of the lung dx in 2019 following w/ Dr. Higgins w/ most recent visit on 2/28 recommendation that patient pursue hospice given progression of disease and no further treatment options avaiable, end-stage COPD on 4L of oxygen, HFpEF, CAD who presented on 3/16 w/ acute on chronic hypoxemic RF 2/2 flu A PNA.     Admitted to the ICU requiring max dose vapotherm 40/100. Started on Tamiflu as well as BSAbx. CTA Chest w/o a clot but did show a dense consolidation in the RML.     Currently, he is on 20/50. States he feels better. Does have a productive cough. History is difficult to obtain and patient is not specific. Reports his son was recently sick just before him. Does not recall how long ago that was. Reports increased cough, sputum production, f/c/ns. Denies chest pain, LE edema, orthopnea, rashes.

## 2023-03-16 NOTE — CARE UPDATE
[facility] [place]  Nurses Note -- 4 Eyes      3/16/2023       Skin assessed on: Admit      [x] No Pressure Injuries Present    [x]Prevention Measures Documented    [] Yes LDA  for Pressure Injury Previously documented     [] Yes New Pressure Injury Discovered   [] LDA for New Pressure Injury Added      Attending RN:  Myrna Ortez RN     Second RN:  Rossana Blackmon RN

## 2023-03-16 NOTE — HPI
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      Patient presented with severe respiratory distress that started shortly prior to presentation.  Per his wife, he started having worsening shortness of breath despite receiving DuoNebs and she noted that he was choking.  Despite being on 4 oxygen at home, he was hypoxic to 65%.  Additional symptoms include decreased p.o. intake and diarrhea which improved.     In the ED, the patient was tachycardic (up to 160), tachypneic and hypoxic requiring BiPAP.  Labs were remarkable for leukocytosis (32.7), elevated D-dimer (> 33.0), elevated BNP (272), elevated troponin (0.045), elevated lactic acid (6.7) and positive influenza A.  Chest x-ray showed persistent bilateral airspace and pleural opacities, slightly improved in the left upper lung since prior exam.  Left upper extremity ultrasound showed no thrombus in central veins.  CTA chest showed no PE, interval enlargement of known left supraclavicular mass/adenopathy, new masslike consolidation with air bronchograms in the right middle lobe, likely pneumonic process and new right hilar node measuring 26 x 22 mm. He was given DuoNebs x3, 1.9 L of LR, 500 mL of NS, vancomycin, Zosyn, and was admitted for further management.

## 2023-03-16 NOTE — PROGRESS NOTES
Pharmacokinetic Initial Assessment: IV Vancomycin    Assessment/Plan:    Initiate intravenous vancomycin with loading dose of 1500 mg once followed by a maintenance dose of vancomycin 1000mg IV every 12 hours  Desired empiric serum trough concentration is 10 to 20 mcg/mL  Draw vancomycin trough level 60 min prior to fourth dose on 3/17/23 at approximately 10;30  Pharmacy will continue to follow and monitor vancomycin.      Please contact pharmacy at extension [phone] with any questions regarding this assessment.     Thank you for the consult,   Dayan Castro       Patient brief summary:  Kashif Iverson is a 62 y.o. male initiated on antimicrobial therapy with IV Vancomycin for treatment of suspected  pneumonia    Drug Allergies:   Review of patient's allergies indicates:  No Known Allergies    Actual Body Weight:   78.9 kg    Renal Function:   Estimated Creatinine Clearance: 71.9 mL/min (based on SCr of 1.1 mg/dL).,     Dialysis Method (if applicable):  N/A    CBC (last 72 hours):  Recent Labs   Lab Result Units 03/15/23  2212   WBC K/uL 32.75*   Hemoglobin g/dL 15.6   Hematocrit % 44.8   Platelets K/uL 287   Gran % % 88.7*   Lymph % % 3.5*   Mono % % 6.8   Eosinophil % % 0.0   Basophil % % 0.2   Differential Method  Automated       Metabolic Panel (last 72 hours):  Recent Labs   Lab Result Units 03/15/23  2329   Sodium mmol/L 136   Potassium mmol/L 2.9*   Chloride mmol/L 102   CO2 mmol/L 18*   Glucose mg/dL 176*   BUN mg/dL 15   Creatinine mg/dL 1.1   Albumin g/dL 2.6*   Total Bilirubin mg/dL 1.2*   Alkaline Phosphatase U/L 111   AST U/L 14   ALT U/L 12   Magnesium mg/dL 1.2*       Drug levels (last 3 results):  No results for input(s): VANCOMYCINRA, VANCORANDOM, VANCOMYCINPE, VANCOPEAK, VANCOMYCINTR, VANCOTROUGH in the last 72 hours.    Microbiologic Results:  Microbiology Results (last 7 days)       Procedure Component Value Units Date/Time    Blood culture #2 **CANNOT BE ORDERED STAT** [[id number]] Collected: 03/15/23  2304    Order Status: Sent Specimen: Blood from Peripheral, Wrist, Right Updated: 03/15/23 2310    Blood culture #1 **CANNOT BE ORDERED STAT** [[id number]] Collected: 03/15/23 2240    Order Status: Sent Specimen: Blood from Peripheral, Hand, Right Updated: 03/15/23 2246

## 2023-03-16 NOTE — PROGRESS NOTES
"  [facility] - [place] - Intensive Care  Adult Nutrition  Consult Note    SUMMARY     Recommendations    1. Monitor PO intake; Add % PO intake for meals to chart.   2. Monitor weight changes; check daily weights.   3. Add ONS Boost Plus TID to increase PO intake.   4. Collaboration with medical providers    Goals: 1. Meet % EEN/EPN by RD follow up.  Nutrition Goal Status: new  Communication of RD Recs: other (comment) (POC)    Assessment and Plan    Nutrition Problem  Inadequate energy intake    Related to (etiology):   Poor PO status    Signs and Symptoms (as evidenced by):   Weight loss of 7% x 2 months    Interventions/Recommendations (treatment strategy):  Com[employer] Bever[employer]- Boost Plus with meals  Monitor PO intake  Collaboration with medical providers    Nutrition Diagnosis Status:   New     Reason for Assessment    Reason For Assessment: identified at risk by screening criteria  Diagnosis:  (acute hypoxemic respiratory failure)  Relevant Medical History: Chronic obstructive pulomonary disease, combined systolic and diastolic heart failure, HTN, lung cancer, PAD  Interdisciplinary Rounds: did not attend  General Information Comments: RD consult 2/2 at risk per MST. Pt intake not recorded in chart. Monitor PO intake of meals. Pt does have 13 lb weight loss per chart review. Check daily weights. Pt currently on Regular Diet. LBM 3/15/23. KNFA. Pt on isolation precautions; droplet.  Nutrition Discharge Planning: Regular Diet    Nutrition Risk Screen    Nutrition Risk Screen: no indicators present    Nutrition/Diet History    Food Allergies: NKFA    Anthropometrics    Temp: 97.7 °F (36.5 °C)  Height Method: Stated  Height: 5' 10" (177.8 cm)  Height (inches): 70 in  Weight Method: Bed Scale  Weight: 78.5 kg (173 lb)  Weight (lb): 173 lb  Ideal Body Weight (IBW), Male: 166 lb  % Ideal Body Weight, Male (lb): 104.78 %  BMI (Calculated): 24.8  BMI Grade: 18.5-24.9 - normal  Usual Body Weight (UBW), k.5 kg  % Usual " Body Weight: 93.06  % Weight Change From Usual Weight: -7.13 %       Lab/Procedures/Meds    Pertinent Labs Reviewed: reviewed  Pertinent Labs Comments: Na 134, k 2.9, Glu 172, Ca 7.8, Pro 5.8, Albumin 2.6, Bilirubin 1.1  Pertinent Medications Reviewed: reviewed    Physical Findings/Assessment         Estimated/Assessed Needs    Weight Used For Calorie Calculations: 78.9 kg (174 lb)  Energy Calorie Requirements (kcal): 9722-1217 kcal  Energy Need Method: [place]-St Jeor (x 1.1)  Protein Requirements: 79 g (1.0 g/kg)  Weight Used For Protein Calculations: 78.9 kg (174 lb)        RDA Method (mL): 1595         Nutrition Prescription Ordered    Current Diet Order: Regular Diet    Evaluation of Received Nutrient/Fluid Intake    I/O: -900 mL  Energy Calories Required: not meeting needs  Protein Required: not meeting needs  Fluid Required: not meeting needs  Comments: LBM 3/15/23  % Intake of Estimated Energy Needs: 50 - 75 %?  % Meal Intake: 50 - 75 %?    Nutrition Risk    Level of Risk/Frequency of Follow-up: low - moderate       Monitor and Evaluation    Food and Nutrient Intake: energy intake, food and beverage intake  Food and Nutrient Adminstration: diet order, enteral and parenteral nutrition administration  Knowledge/Beliefs/Attitudes: food and nutrition knowledge/skill, beliefs and attitudes  Physical Activity and Function: nutrition-related ADLs and IADLs, factors affecting access to physical activity  Anthropometric Measurements: weight, weight change, body mass index  Biochemical Data, Medical Tests and Procedures: electrolyte and renal panel, gastrointestinal profile, glucose/endocrine profile, inflammatory profile, lipid profile  Nutrition-Focused Physical Findings: overall appearance       Nutrition Follow-Up    RD Follow-up?: Yes    Mary Abrams, Registration Eligible, Provisional LDN

## 2023-03-16 NOTE — PLAN OF CARE
Patient remains in ICU on 20 L 60% on Vapotherm. AAOx3. VSS and afebrile. Adequate urine output per urinal at bedside. Patent updated on plan of care per MD. Free of injury, falls, and skin breakdown on this shift.

## 2023-03-16 NOTE — ASSESSMENT & PLAN NOTE
Advance Care Planning     Date: 03/16/2023    Code Status  I discussed the patient's code status with the patient and his wife.  He wants to remain full code at this time.  I spent a total of 2 minutes engaging the patient in this advance care planning discussion.

## 2023-03-16 NOTE — CARE UPDATE
[facility] [place], [place]  Nurses Note -- 4 Eyes      3/16/2023       Skin assessed on: Q Shift      [x] No Pressure Injuries Present    [x]Prevention Measures Documented    [] Yes LDA  for Pressure Injury Previously documented     [] Yes New Pressure Injury Discovered   [] LDA for New Pressure Injury Added      Attending RN:  Teresa Warren RN     Second RN:  Rosalie Bradley RN

## 2023-03-16 NOTE — CARE UPDATE
Pt transferred from ED to ICU around 0200. Pt on 40 L vapotherm 100%. Afebrile. Alert and oriented x 4. VVS. PO lasix held due to soft BP. Remains tachypneic but WOB and sats have improved. Urinal at bedside. Droplet precautions initiated/maintained. Skin is intact but pt sacrum is very red/purple and barley blanchable. No new falls injuries or skin breakdown.

## 2023-03-16 NOTE — CLINICAL REVIEW
IP Sepsis Screen (most recent)       Sepsis Screen (IP) - 03/16/23 0908       Is the patient's history or complaint suggestive of a possible infection? Yes  -CB    Are there at least two of the following signs and symptoms present? Yes  -CB    Sepsis signs/symptoms - Tachycardia Tachycardia     >90  -CB    Sepsis signs/symptoms - Tachypnea Tachypnea     >20  -CB    Sepsis signs/symptoms - WBC WBC < 4,000 or WBC > 12,000  -CB    Are any of the following organ dysfunction criteria present and not considered to be due to a chronic condition? Yes  -CB    Organ Dysfunction Criteria Lactate > 2.0  -CB    Organ Dysfunction Criteria - Resp Comp Respiratory Compromise: Requiring > 5L NC  -CB    Initiate Sepsis Protocol No  -CB    Reason sepsis not considered Directed by provider   on abx -CB              User Key  (r) = Recorded By, (t) = Taken By, (c) = Cosigned By      Initials Name    CB Leonor Perkins RN

## 2023-03-16 NOTE — ASSESSMENT & PLAN NOTE
Results for orders placed during the hospital encounter of 09/28/22    Echo    Interpretation Summary  · The left ventricle is normal in size with concentric hypertrophy and normal systolic function.  · The estimated ejection fraction is 60%.  · Normal left ventricular diastolic function.  · Mild right ventricular enlargement with mildly reduced right ventricular systolic function.  · Mild left atrial enlargement.  · Mild right atrial enlargement.  · Normal central venous pressure (3 mmHg).  · The estimated PA systolic pressure is 40 mmHg.  · Trivial posterior pericardial effusion.    Doubt exacerbation  Resume home medications

## 2023-03-16 NOTE — H&P
[facility] Medicine  History & Physical    Patient Name: Kashif Iverson  MRN: [id number]  Patient Class: IP- Inpatient  Admission Date: 3/15/2023  Attending Physician: Jennifer Singleton MD   Primary Care Provider: Eduardo Ruiz MD         Patient information was obtained from patient and ER records.     Subjective:     Principal Problem:Acute hypoxemic respiratory failure    Chief Complaint:   Chief Complaint   Patient presents with    Shortness of Breath     Patient presents to the ED with reports of having shortness of breath. Patient on 5 liters on nasal cannula from home.         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     Patient presented with severe respiratory distress that started shortly prior to presentation.  Per his wife, he started having worsening shortness of breath despite receiving DuoNebs and she noted that he was choking.  Despite being on 4 oxygen at home, he was hypoxic to 65%.  Additional symptoms include decreased p.o. intake and diarrhea which improved.    In the ED, the patient was tachycardic (up to 160), tachypneic and hypoxic requiring BiPAP.  Labs were remarkable for leukocytosis (32.7), elevated D-dimer (> 33.0), elevated BNP (272), elevated troponin (0.045), elevated lactic acid (6.7) and positive influenza A.  Chest x-ray showed persistent bilateral airspace and pleural opacities, slightly improved in the left upper lung since prior exam.  Left upper extremity ultrasound showed no thrombus in central veins.  CTA chest showed no PE, interval enlargement of known left supraclavicular mass/adenopathy, new masslike consolidation with air bronchograms in the right middle lobe, likely pneumonic process and new right hilar node  measuring 26 x 22 mm..  He was given DuoNebs x3, 1.9 L of LR, 500 mL of NS, vancomycin, Zosyn, and was admitted for further management.          Past Medical History:   Diagnosis Date    Chronic obstructive pulmonary disease with acute exacerbation 9/5/2022    Combined systolic and diastolic heart failure     Dependence on supplemental oxygen 5/24/2022    History of completed stroke 9/3/2019     09/03/2019 On CT exam    History of non-ST elevation myocardial infarction (NSTEMI) 2/22/2022    Hypertension     Lung cancer     NSCLC    Lung mass     left lung    Macrocytic anemia 8/24/2019    PAD (peripheral artery disease) 3/14/2022    LUIS FELIPE 3/11/22    Peripheral artery disease        Past Surgical History:   Procedure Laterality Date    BRONCHOSCOPY N/A 08/30/2019    Procedure: Bronchoscopy;  Surgeon: [facility] Diagnostic Provider;  Location: [facility] OR [address] [place] [address];  Service: Anesthesiology;  Laterality: N/A;    CORONARY ANGIOGRAPHY N/A 03/04/2022    Procedure: ANGIOGRAM, CORONARY ARTERY;  Surgeon: Robson Green MD;  Location: [facility] CATH LAB;  Service: Cardiology;  Laterality: N/A;    INSERTION OF TUNNELED CENTRAL VENOUS CATHETER (CVC) WITH SUBCUTANEOUS PORT         Review of patient's allergies indicates:  No Known Allergies    Current Facility-Administered Medications on File Prior to Encounter   Medication    heparin, porcine (PF) 100 unit/mL injection flush 500 Units    sodium chloride 0.9% flush 10 mL     Current Outpatient Medications on File Prior to Encounter   Medication Sig    albuterol (VENTOLIN HFA) 90 mcg/actuation inhaler Inhale 2 puffs into the lungs every 6 (six) hours as needed for Wheezing. Rescue    albuterol-ipratropium (DUO-NEB) 2.5 mg-0.5 mg/3 mL nebulizer solution Inhale contents of 1 vial (3 mLs) by nebulization every 4 (four) hours as needed for Wheezing or Shortness of Breath. Rescue    amLODIPine (NORVASC) 10 MG tablet Take 1 tablet (10 mg total) by mouth once daily.    apixaban  (ELIQUIS) 5 mg Tab Take 1 tablet (5 mg total) by mouth 2 (two) times daily.    ascorbic acid-multivit-min (VITAMIN C ENERGY BOOSTER) 1,000 mg PwEP Take 3,000 mg by mouth once daily. Patient takes 3,000 mg per day    aspirin 81 MG Chew Take 1 tablet (81 mg total) by mouth once daily.    atorvastatin (LIPITOR) 80 MG tablet Take 1 tablet (80 mg total) by mouth once daily.    cilostazoL (PLETAL) 100 MG Tab Take 1 tablet (100 mg total) by mouth 2 (two) times daily.    furosemide (LASIX) 40 MG tablet Take 1 tablet (40 mg total) by mouth 2 (two) times a day.    metoprolol tartrate (LOPRESSOR) 50 MG tablet Take 1 tablet (50 mg total) by mouth 3 (three) times daily.    oxyCODONE (ROXICODONE) 15 MG Tab TAKE 1 PO AT NIGHT PRN PAIN AND 1/3 PILL DAILY PRN PAIN    polyethylene glycol (GLYCOLAX) 17 gram/dose powder Mix 1 capful (17 grams total) with a liquid and take by mouth once daily.    tiotropium (SPIRIVA) 18 mcg inhalation capsule Inhale 1 capsule (18 mcg total) into the lungs via handihaler once daily. Controller     Family History       Problem Relation (Age of Onset)    Cancer Father, Sister    Diabetes Mother    Heart defect Mother    No Known Problems Son          Tobacco Use    Smoking status: Former     Packs/day: 1.00     Years: 25.00     Pack years: 25.00     Types: Cigarettes     Quit date: 2020     Years since quitting: 3.2    Smokeless tobacco: Never   Substance and Sexual Activity    Alcohol use: Yes     Comment: 11/3/22: Occ.    Drug use: Never    Sexual activity: Yes     Partners: Female     Review of Systems   Constitutional:  Positive for appetite change.   HENT: Negative.     Eyes: Negative.    Respiratory:  Positive for shortness of breath.    Cardiovascular: Negative.    Gastrointestinal:  Positive for diarrhea.   Endocrine: Negative.    Genitourinary: Negative.    Musculoskeletal: Negative.    Skin: Negative.    Allergic/Immunologic: Negative.    Neurological: Negative.     Psychiatric/Behavioral: Negative.     Objective:     Vital Signs (Most Recent):  Temp: 97.9 °F (36.6 °C) (03/15/23 2344)  Pulse: 98 (03/16/23 0138)  Resp: (!) 32 (03/16/23 0043)  BP: 103/71 (03/16/23 0138)  SpO2: (!) 92 % (03/16/23 0138)   Vital Signs (24h Range):  Temp:  [97.9 °F (36.6 °C)-100.4 °F (38 °C)] 97.9 °F (36.6 °C)  Pulse:  [] 98  Resp:  [24-51] 32  SpO2:  [76 %-97 %] 92 %  BP: (103-215)/() 103/71     Weight: 78.9 kg (174 lb)  Body mass index is 24.97 kg/m².    Physical Exam  Vitals and nursing note reviewed.   Constitutional:       General: He is in acute distress.      Appearance: He is ill-appearing.   HENT:      Head: Normocephalic and atraumatic.      Nose: Nose normal.      Mouth/Throat:      Mouth: Mucous membranes are moist.   Eyes:      Extraocular Movements: Extraocular movements intact.   Cardiovascular:      Rate and Rhythm: Tachycardia present.      Pulses: Normal pulses.      Heart sounds: No murmur heard.  Pulmonary:      Effort: Respiratory distress present.      Comments: Diminished breath sounds with bibasilar crackles  Abdominal:      General: Abdomen is flat.      Palpations: Abdomen is soft.      Tenderness: There is no abdominal tenderness.   Musculoskeletal:      Right lower leg: No edema.      Left lower leg: No edema.      Comments: Left upper extremity swelling   Skin:     General: Skin is warm.      Capillary Refill: Capillary refill takes less than 2 seconds.   Neurological:      General: No focal deficit present.      Mental Status: He is alert.   Psychiatric:         Mood and Affect: Mood normal.           Significant Labs: All pertinent labs within the past 24 hours have been reviewed.    Significant Imaging: I have reviewed all pertinent imaging results/findings within the past 24 hours.    Assessment/Plan:     * Acute hypoxemic respiratory failure  Patient with Hypoxic Respiratory failure which is Acute on chronic.  he is on home oxygen at 4 LPM. Supplemental  oxygen was provided and noted- Oxygen Concentration (%):  [] 100.   Signs/symptoms of respiratory failure include- increased work of breathing and respiratory distress. Contributing diagnoses includes - Pneumonia and influenza Labs and images were reviewed. Patient Has recent ABG, which has been reviewed. Will treat underlying causes and adjust management of respiratory failure as follows- See plan for HAP/Influenza     Influenza A  Tamiflu x5 days      Advanced care planning/counseling discussion  Advance Care Planning     Date: 03/16/2023    Code Status  I discussed the patient's code status with the patient and his wife.  He wants to remain full code at this time.  I spent a total of 2 minutes engaging the patient in this advance care planning discussion.           Persistent atrial fibrillation  [id number] Score: 3   Resume home metoprolol and Eliquis  Telemetry monitoring    COPD (chronic obstructive pulmonary disease)  Scheduled DuoNebs        Coronary artery disease involving native coronary artery of native heart without angina pectoris  No active chest pain.  Resume home medications.  Telemetry monitoring.      PAD (peripheral artery disease)  Resume home medication      Chronic diastolic heart failure  Results for orders placed during the hospital encounter of 09/28/22    Echo    Interpretation Summary  · The left ventricle is normal in size with concentric hypertrophy and normal systolic function.  · The estimated ejection fraction is 60%.  · Normal left ventricular diastolic function.  · Mild right ventricular enlargement with mildly reduced right ventricular systolic function.  · Mild left atrial enlargement.  · Mild right atrial enlargement.  · Normal central venous pressure (3 mmHg).  · The estimated PA systolic pressure is 40 mmHg.  · Trivial posterior pericardial effusion.    Doubt exacerbation  Resume home medications      Essential hypertension  Resume home medications.    Monitor blood  pressure      Lung cancer, main bronchus, left  Outpatient follow-up with Oncology      History of completed stroke  Resume home medications      Hospital-acquired pneumonia  CTA chest showed no PE, interval enlargement of known left supraclavicular mass/adenopathy, new masslike consolidation with air bronchograms in the right middle lobe, likely pneumonic process and new right hilar node measuring 26 x 22 mm..  Continue vancomycin/Zosyn      VTE Risk Mitigation (From admission, onward)         Ordered     apixaban tablet 5 mg  2 times daily         03/16/23 0221     IP VTE HIGH RISK PATIENT  Once         03/16/23 0221     Place sequential compression device  Until discontinued         03/16/23 0221              Critical care time spent on the evaluation and treatment of severe organ dysfunction, review of pertinent labs and imaging studies, discussions with consulting providers and discussions with patient/family: 35 minutes.           David Marroquin MD  Department of Hospital Medicine  [facility] - Intensive Care

## 2023-03-16 NOTE — SUBJECTIVE & OBJECTIVE
Past Medical History:   Diagnosis Date    Chronic obstructive pulmonary disease with acute exacerbation 9/5/2022    Combined systolic and diastolic heart failure     Dependence on supplemental oxygen 5/24/2022    History of completed stroke 9/3/2019     09/03/2019 On CT exam    History of non-ST elevation myocardial infarction (NSTEMI) 2/22/2022    Hypertension     Lung cancer     NSCLC    Lung mass     left lung    Macrocytic anemia 8/24/2019    PAD (peripheral artery disease) 3/14/2022    LUIS FELIPE 3/11/22    Peripheral artery disease        Past Surgical History:   Procedure Laterality Date    BRONCHOSCOPY N/A 08/30/2019    Procedure: Bronchoscopy;  Surgeon: [facility] Diagnostic Provider;  Location: [facility] OR [address] [place] [address];  Service: Anesthesiology;  Laterality: N/A;    CORONARY ANGIOGRAPHY N/A 03/04/2022    Procedure: ANGIOGRAM, CORONARY ARTERY;  Surgeon: Robson Green MD;  Location: [facility] CATH LAB;  Service: Cardiology;  Laterality: N/A;    INSERTION OF TUNNELED CENTRAL VENOUS CATHETER (CVC) WITH SUBCUTANEOUS PORT         Review of patient's allergies indicates:  No Known Allergies    Family History       Problem Relation (Age of Onset)    Cancer Father, Sister    Diabetes Mother    Heart defect Mother    No Known Problems Son          Tobacco Use    Smoking status: Former     Packs/day: 1.00     Years: 25.00     Pack years: 25.00     Types: Cigarettes     Quit date: 2020     Years since quitting: 3.2    Smokeless tobacco: Never   Substance and Sexual Activity    Alcohol use: Yes     Comment: 11/3/22: Occ.    Drug use: Never    Sexual activity: Yes     Partners: Female         Review of Systems  As above    Objective:     Vital Signs (Most Recent):  Temp: 98.2 °F (36.8 °C) (03/16/23 1101)  Pulse: 81 (03/16/23 1400)  Resp: (!) 26 (03/16/23 1400)  BP: (!) 97/59 (03/16/23 1400)  SpO2: (!) 93 % (03/16/23 1400)   Vital Signs (24h Range):  Temp:  [97.7 °F (36.5 °C)-100.4 °F (38 °C)] 98.2 °F (36.8 °C)  Pulse:  [] 81  Resp:   [] 26  SpO2:  [76 %-100 %] 93 %  BP: ()/() 97/59     Weight: 78.5 kg (173 lb)  Body mass index is 24.82 kg/m².      Intake/Output Summary (Last 24 hours) at 3/16/2023 1437  Last data filed at 3/16/2023 1400  Gross per 24 hour   Intake 2184.92 ml   Output 1300 ml   Net 884.92 ml       Physical Exam  Vitals and nursing note reviewed.   Constitutional:       Appearance: He is well-developed.   HENT:      Head: Normocephalic and atraumatic.   Eyes:      General: No scleral icterus.     Extraocular Movements: Extraocular movements intact.   Neck:      Vascular: No JVD.      Trachea: No tracheal deviation.   Cardiovascular:      Rate and Rhythm: Normal rate and regular rhythm.      Heart sounds: Normal heart sounds. No murmur heard.    No friction rub. No gallop.   Pulmonary:      Effort: Pulmonary effort is normal.      Breath sounds: No stridor. Rales present. No wheezing.   Abdominal:      General: Bowel sounds are normal. There is no distension.      Palpations: Abdomen is soft.   Musculoskeletal:         General: Normal range of motion.      Cervical back: Normal range of motion.   Skin:     General: Skin is warm and dry.      Capillary Refill: Capillary refill takes less than 2 seconds.      Findings: No erythema or rash.   Neurological:      General: No focal deficit present.      Mental Status: He is alert and oriented to person, place, and time.   Psychiatric:         Mood and Affect: Mood normal.         Behavior: Behavior normal.       Vents:  Oxygen Concentration (%): 70 (03/16/23 1101)    Lines/Drains/Airways       Central Venous Catheter Line  Duration             Port A Cath Single Lumen right subclavian -- days              Peripheral Intravenous Line  Duration                  Peripheral IV - Single Lumen 03/15/23 2207 20 G Anterior;Distal;Right Wrist <1 day         Peripheral IV - Single Lumen 03/15/23 2238 20 G Posterior;Right Hand <1 day         Peripheral IV - Single Lumen 03/15/23  2328 20 G Anterior;Proximal;Right Forearm <1 day                    Significant Labs:    CBC/Anemia Profile:  Recent Labs   Lab 03/15/23  2212 03/15/23  2243 03/16/23  0414   WBC 32.75*  --  29.78*   HGB 15.6  --  13.3*   HCT 44.8 51 39.5*     --  223   MCV 82  --  82   RDW 15.7*  --  15.6*        Chemistries:  Recent Labs   Lab 03/15/23  2329 03/16/23  0414    134*   K 2.9* 2.9*    97   CO2 18* 23   BUN 15 15   CREATININE 1.1 1.1   CALCIUM 7.3* 7.8*   ALBUMIN 2.6* 2.6*   PROT 5.9* 5.8*   BILITOT 1.2* 1.1*   ALKPHOS 111 102   ALT 12 12   AST 14 12   MG 1.2* 1.9   PHOS  --  2.7       All pertinent labs within the past 24 hours have been reviewed.    Significant Imaging:   I have reviewed all pertinent imaging results/findings within the past 24 hours.

## 2023-03-16 NOTE — SUBJECTIVE & OBJECTIVE
Past Medical History:   Diagnosis Date    Chronic obstructive pulmonary disease with acute exacerbation 9/5/2022    Combined systolic and diastolic heart failure     Dependence on supplemental oxygen 5/24/2022    History of completed stroke 9/3/2019     09/03/2019 On CT exam    History of non-ST elevation myocardial infarction (NSTEMI) 2/22/2022    Hypertension     Lung cancer     NSCLC    Lung mass     left lung    Macrocytic anemia 8/24/2019    PAD (peripheral artery disease) 3/14/2022    LUIS FELIPE 3/11/22    Peripheral artery disease        Past Surgical History:   Procedure Laterality Date    BRONCHOSCOPY N/A 08/30/2019    Procedure: Bronchoscopy;  Surgeon: [facility] Diagnostic Provider;  Location: [facility] OR [address] [place] [address];  Service: Anesthesiology;  Laterality: N/A;    CORONARY ANGIOGRAPHY N/A 03/04/2022    Procedure: ANGIOGRAM, CORONARY ARTERY;  Surgeon: Robson Green MD;  Location: [facility] CATH LAB;  Service: Cardiology;  Laterality: N/A;    INSERTION OF TUNNELED CENTRAL VENOUS CATHETER (CVC) WITH SUBCUTANEOUS PORT         Review of patient's allergies indicates:  No Known Allergies    Current Facility-Administered Medications on File Prior to Encounter   Medication    heparin, porcine (PF) 100 unit/mL injection flush 500 Units    sodium chloride 0.9% flush 10 mL     Current Outpatient Medications on File Prior to Encounter   Medication Sig    albuterol (VENTOLIN HFA) 90 mcg/actuation inhaler Inhale 2 puffs into the lungs every 6 (six) hours as needed for Wheezing. Rescue    albuterol-ipratropium (DUO-NEB) 2.5 mg-0.5 mg/3 mL nebulizer solution Inhale contents of 1 vial (3 mLs) by nebulization every 4 (four) hours as needed for Wheezing or Shortness of Breath. Rescue    amLODIPine (NORVASC) 10 MG tablet Take 1 tablet (10 mg total) by mouth once daily.    apixaban (ELIQUIS) 5 mg Tab Take 1 tablet (5 mg total) by mouth 2 (two) times daily.    ascorbic acid-multivit-min (VITAMIN C ENERGY BOOSTER) 1,000 mg PwEP Take 3,000 mg by mouth  once daily. Patient takes 3,000 mg per day    aspirin 81 MG Chew Take 1 tablet (81 mg total) by mouth once daily.    atorvastatin (LIPITOR) 80 MG tablet Take 1 tablet (80 mg total) by mouth once daily.    cilostazoL (PLETAL) 100 MG Tab Take 1 tablet (100 mg total) by mouth 2 (two) times daily.    furosemide (LASIX) 40 MG tablet Take 1 tablet (40 mg total) by mouth 2 (two) times a day.    metoprolol tartrate (LOPRESSOR) 50 MG tablet Take 1 tablet (50 mg total) by mouth 3 (three) times daily.    oxyCODONE (ROXICODONE) 15 MG Tab TAKE 1 PO AT NIGHT PRN PAIN AND 1/3 PILL DAILY PRN PAIN    polyethylene glycol (GLYCOLAX) 17 gram/dose powder Mix 1 capful (17 grams total) with a liquid and take by mouth once daily.    tiotropium (SPIRIVA) 18 mcg inhalation capsule Inhale 1 capsule (18 mcg total) into the lungs via handihaler once daily. Controller     Family History       Problem Relation (Age of Onset)    Cancer Father, Sister    Diabetes Mother    Heart defect Mother    No Known Problems Son          Tobacco Use    Smoking status: Former     Packs/day: 1.00     Years: 25.00     Pack years: 25.00     Types: Cigarettes     Quit date: 2020     Years since quitting: 3.2    Smokeless tobacco: Never   Substance and Sexual Activity    Alcohol use: Yes     Comment: 11/3/22: Occ.    Drug use: Never    Sexual activity: Yes     Partners: Female     Review of Systems   Constitutional:  Positive for appetite change.   HENT: Negative.     Eyes: Negative.    Respiratory:  Positive for shortness of breath.    Cardiovascular: Negative.    Gastrointestinal:  Positive for diarrhea.   Endocrine: Negative.    Genitourinary: Negative.    Musculoskeletal: Negative.    Skin: Negative.    Allergic/Immunologic: Negative.    Neurological: Negative.    Psychiatric/Behavioral: Negative.     Objective:     Vital Signs (Most Recent):  Temp: 97.9 °F (36.6 °C) (03/15/23 2344)  Pulse: 98 (03/16/23 0138)  Resp: (!) 32 (03/16/23 0043)  BP: 103/71 (03/16/23  0138)  SpO2: (!) 92 % (03/16/23 0138)   Vital Signs (24h Range):  Temp:  [97.9 °F (36.6 °C)-100.4 °F (38 °C)] 97.9 °F (36.6 °C)  Pulse:  [] 98  Resp:  [24-51] 32  SpO2:  [76 %-97 %] 92 %  BP: (103-215)/() 103/71     Weight: 78.9 kg (174 lb)  Body mass index is 24.97 kg/m².    Physical Exam  Vitals and nursing note reviewed.   Constitutional:       General: He is in acute distress.      Appearance: He is ill-appearing.   HENT:      Head: Normocephalic and atraumatic.      Nose: Nose normal.      Mouth/Throat:      Mouth: Mucous membranes are moist.   Eyes:      Extraocular Movements: Extraocular movements intact.   Cardiovascular:      Rate and Rhythm: Tachycardia present.      Pulses: Normal pulses.      Heart sounds: No murmur heard.  Pulmonary:      Effort: Respiratory distress present.      Comments: Diminished breath sounds with bibasilar crackles  Abdominal:      General: Abdomen is flat.      Palpations: Abdomen is soft.      Tenderness: There is no abdominal tenderness.   Musculoskeletal:      Right lower leg: No edema.      Left lower leg: No edema.      Comments: Left upper extremity swelling   Skin:     General: Skin is warm.      Capillary Refill: Capillary refill takes less than 2 seconds.   Neurological:      General: No focal deficit present.      Mental Status: He is alert.   Psychiatric:         Mood and Affect: Mood normal.           Significant Labs: All pertinent labs within the past 24 hours have been reviewed.    Significant Imaging: I have reviewed all pertinent imaging results/findings within the past 24 hours.

## 2023-03-17 PROBLEM — J18.9 PNEUMONIA: Status: ACTIVE | Noted: 2023-01-01

## 2023-03-17 NOTE — ASSESSMENT & PLAN NOTE
Influenza A + suspected bacterial pneumonia as well. Unable to collect sputum culture. Blood cultures with GNR  - continue antibiotics and tamiflu  - follow up blood cultures

## 2023-03-17 NOTE — ASSESSMENT & PLAN NOTE
Results for orders placed during the hospital encounter of 09/28/22    Echo    Interpretation Summary  · The left ventricle is normal in size with concentric hypertrophy and normal systolic function.  · The estimated ejection fraction is 60%.  · Normal left ventricular diastolic function.  · Mild right ventricular enlargement with mildly reduced right ventricular systolic function.  · Mild left atrial enlargement.  · Mild right atrial enlargement.  · Normal central venous pressure (3 mmHg).  · The estimated PA systolic pressure is 40 mmHg.  · Trivial posterior pericardial effusion.    No signs of acute exacerbation  BNP  Recent Labs   Lab 03/15/23  2212   *     BNP was mildly elevated on admit- may try IV lasix to help with weaning O2 tomorrow. Currently continues on PO lasix   Note: previously did have mixed systolic/diastolic CHF with EF down to 35% (2/2022). EF has recovered.   - continues on BB  - not on ACEi/ARB/entersto/spironolactone as outpatient. Will not start now

## 2023-03-17 NOTE — ASSESSMENT & PLAN NOTE
Baseline 4L of oxygen at home 2/2 COPD/Bronchiectasis. Acute worsening 2/2 RML PNA d/t Flu A with possible secondary bacterial infection.     He reports that is it not on maintenance inhaler therapy. Simply takes nebs at home for his COPD.    - sputum culture pending  - Agree w/ tamiflu and BSAbx  - Duonebs scheduled  - Oxygenation imiproving. Goal sat >88%  - Will need to start on triple therapy inhaler (Breztri or Trelegy) after he recovers from this PNA  - Agree w/ Palliative involvement

## 2023-03-17 NOTE — SUBJECTIVE & OBJECTIVE
Interval History: Blood culture w/ GNR. Oxygenation requirements stable at 20/60. Mental status not quite back to baseline so CT Head is pending.       Objective:     Vital Signs (Most Recent):  Temp: 98.2 °F (36.8 °C) (03/17/23 0701)  Pulse: 82 (03/17/23 1000)  Resp: (!) 27 (03/17/23 1000)  BP: 101/66 (03/17/23 1000)  SpO2: (!) 94 % (03/17/23 1000)   Vital Signs (24h Range):  Temp:  [97.7 °F (36.5 °C)-98.9 °F (37.2 °C)] 98.2 °F (36.8 °C)  Pulse:  [] 82  Resp:  [19-77] 27  SpO2:  [83 %-98 %] 94 %  BP: ()/(52-76) 101/66     Weight: 77.9 kg (171 lb 11.8 oz)  Body mass index is 24.64 kg/m².      Intake/Output Summary (Last 24 hours) at 3/17/2023 1051  Last data filed at 3/17/2023 1000  Gross per 24 hour   Intake 831.96 ml   Output 2050 ml   Net -1218.04 ml       Physical Exam  Vitals and nursing note reviewed. Exam conducted with a chaperone present.   Constitutional:       Appearance: He is not diaphoretic.      Comments: Responses are slow. No other focal neuro deficits   Eyes:      Pupils: Pupils are equal, round, and reactive to light.   Cardiovascular:      Rate and Rhythm: Normal rate and regular rhythm.   Pulmonary:      Effort: No respiratory distress.      Breath sounds: Rhonchi and rales present. No wheezing.   Skin:     Capillary Refill: Capillary refill takes less than 2 seconds.     Review of Systems    Vents:  Oxygen Concentration (%): 60 (03/17/23 0900)    Lines/Drains/Airways       Central Venous Catheter Line  Duration             Port A Cath Single Lumen right subclavian -- days              Peripheral Intravenous Line  Duration                  Peripheral IV - Single Lumen 03/17/23 0806 20 G Anterior;Proximal;Right Forearm <1 day                    Significant Labs:    CBC/Anemia Profile:  Recent Labs   Lab 03/15/23  2212 03/15/23  2243 03/16/23  0414 03/17/23  0340   WBC 32.75*  --  29.78* 12.30   HGB 15.6  --  13.3* 12.2*   HCT 44.8 51 39.5* 37.0*     --  223 228   MCV 82  --  82  85   RDW 15.7*  --  15.6* 16.0*        Chemistries:  Recent Labs   Lab 03/15/23  2329 03/16/23  0414 03/17/23  0340    134* 138   K 2.9* 2.9* 2.9*    97 100   CO2 18* 23 29   BUN 15 15 13   CREATININE 1.1 1.1 0.8   CALCIUM 7.3* 7.8* 8.4*   ALBUMIN 2.6* 2.6* 2.6*   PROT 5.9* 5.8* 5.8*   BILITOT 1.2* 1.1* 1.1*   ALKPHOS 111 102 89   ALT 12 12 10   AST 14 12 11   MG 1.2* 1.9 1.9   PHOS  --  2.7 2.8       All pertinent labs within the past 24 hours have been reviewed.    Significant Imaging:  I have reviewed all pertinent imaging results/findings within the past 24 hours.

## 2023-03-17 NOTE — SUBJECTIVE & OBJECTIVE
Interval History: Feeling better this morning- less short of breath, no chest pain, no cough. Seems still somewhat confused about why he is in the hospital and the status of his cancer.     Review of Systems   Constitutional:  Negative for chills and fever.   Respiratory:  Positive for shortness of breath. Negative for cough.    Cardiovascular:  Negative for chest pain, palpitations and leg swelling.   Gastrointestinal:  Negative for abdominal pain, constipation, diarrhea, nausea and vomiting.   Genitourinary:  Negative for difficulty urinating.   Neurological:  Negative for weakness and numbness.   Psychiatric/Behavioral:  Positive for confusion.    Objective:     Vital Signs (Most Recent):  Temp: 98.2 °F (36.8 °C) (03/17/23 0701)  Pulse: 95 (03/17/23 0900)  Resp: 19 (03/17/23 0900)  BP: 101/62 (03/17/23 0900)  SpO2: 98 % (03/17/23 0900) Vital Signs (24h Range):  Temp:  [97.7 °F (36.5 °C)-98.9 °F (37.2 °C)] 98.2 °F (36.8 °C)  Pulse:  [] 95  Resp:  [19-77] 19  SpO2:  [83 %-98 %] 98 %  BP: ()/(52-76) 101/62     Weight: 77.9 kg (171 lb 11.8 oz)  Body mass index is 24.64 kg/m².    Intake/Output Summary (Last 24 hours) at 3/17/2023 0939  Last data filed at 3/17/2023 0900  Gross per 24 hour   Intake 832.57 ml   Output 1850 ml   Net -1017.43 ml      Physical Exam  Vitals and nursing note reviewed.   Constitutional:       General: He is not in acute distress.     Appearance: He is ill-appearing. He is not toxic-appearing.   HENT:      Head: Normocephalic and atraumatic.      Nose: Nose normal.      Mouth/Throat:      Mouth: Mucous membranes are moist.   Cardiovascular:      Rate and Rhythm: Normal rate and regular rhythm.   Pulmonary:      Effort: Pulmonary effort is normal.      Breath sounds: Rhonchi present.      Comments: Vapotherm 20/60  Abdominal:      General: Bowel sounds are normal. There is no distension.      Palpations: Abdomen is soft. There is no mass.      Tenderness: There is no abdominal  tenderness. There is no guarding.   Musculoskeletal:         General: Swelling (L arm) present.      Right lower leg: No edema.      Left lower leg: No edema.   Skin:     General: Skin is warm and dry.      Findings: Bruising present.   Neurological:      Mental Status: He is alert.      Comments: Oriented to self and location. Not oriented to situation       Significant Labs: All pertinent labs within the past 24 hours have been reviewed.    Significant Imaging: I have reviewed all pertinent imaging results/findings within the past 24 hours.

## 2023-03-17 NOTE — CARE UPDATE
[facility]  Nurses Note -- 4 Eyes      3/17/2023       Skin assessed on: Q Shift      [x] No Pressure Injuries Present    [x]Prevention Measures Documented    [] Yes LDA  for Pressure Injury Previously documented     [] Yes New Pressure Injury Discovered   [] LDA for New Pressure Injury Added      Attending RN:  Teresa Warren RN     Second RN:  Jamir Manley RN

## 2023-03-17 NOTE — CONSULTS
[facility] - Intensive Care  Palliative Medicine  Consult Note    Patient Name: Kashif Iverson  MRN: [id number]  Admission Date: 3/15/2023  Hospital Length of Stay: 1 days  Code Status: Full Code   Attending Provider: Jennifer Singleton MD  Consulting Provider: Eli Arango NP  Primary Care Physician: Eduardo Ruiz MD  Principal Problem:Acute on chronic respiratory failure with hypoxia    Patient information was obtained from patient, past medical records, ER records and primary team.      Inpatient consult to Palliative Care  Consult performed by: Eli Arango NP  Consult ordered by: Jennifer Singleton MD  Reason for consult: [facility]        Assessment/Plan:   Palliative encounter:  Advance Care Planning   -Discussed in ICU IDT rounds this am  -Palliative consult received; patient well known to me.   -chart reviewed in detail  -Last admit to hospital earlier in month the oncologist and myself discussed hospice as only option at this time and that his lung cancer has continued to progress despite maintenance chemo and there are no other tx options.  -We also discussed advanced COPD and that he may likely die from COPD before the cancer but either way he his life expectancy was limited.    -The patient and his spouse seemed shocked that the patient was so ill. They did agree to speak with hospice at that time and the hospice nurse reported the patient and spouse again seemed shock the patient was ill and said  no one ever told them he was this sick. At discharge 3/6/23 they wanted to continue with home Palliative and planned to go visit family the patient had not seen in a long time before agreeing to hospice. This never happened before patient was admitted to hospital again.   -at this time the patient in ICU with respiratory failure. He is on vapotherm 100 % and is very weak and sleepy today. Plans to try and wean oxygen today.  -I did touch base with he patient's PCP Dr Ruiz today to let him know the patient was in  "hospital again and that hospice was recommended in past. He too had spoken to the patient and family last week regarding EOL care. He is going to touch base with the patient;s spouse again to help with the encouragement of hospice at discharge.  - I also touched base with patient's oncologist Dr Higgins and he said he was still recommending hospice and would be glad to f/u with them outpatient to discuss again.   - I will try to reach spouse; she works during the day  -updated Dr Edwards    Acute hypoxemic respiratory failure  Patient with Hypoxic Respiratory failure which is Acute on chronic.  he is on home oxygen at 4 LPM. Supplemental oxygen was provided and noted- Oxygen Concentration (%):  [] 100.   Signs/symptoms of respiratory failure include- increased work of breathing and respiratory distress. Contributing diagnoses includes - Pneumonia and influenza Labs and images were reviewed. Patient Has recent ABG, which has been reviewed. Will treat underlying causes and adjust management of respiratory failure as follows- See plan for HAP/Influenza      Influenza A  -Mgmt per primary    Hospital-acquired pneumonia  "CTA chest showed no PE, interval enlargement of known left supraclavicular mass/adenopathy, new masslike consolidation with air bronchograms in the right middle lobe, likely pneumonic process and new right hilar node measuring 26 x 22 mm."  -mgmt per primary     COPD (chronic obstructive pulmonary disease)  -advanced COPD  -multiple hospitalizations  -meets hospice criteria    Lung cancer, main bronchus, left  -no longer on maintenance chemo  -oncology recommended hospice        Persistent atrial fibrillation  -[id number] Score: 3   -mgmt per rprimary     Coronary artery disease involving native coronary artery of native heart without angina pectoris  noted      PAD (peripheral artery disease)  noted         Chronic diastolic heart failure  noted        Essential hypertension  noted      "   History of completed stroke  noted      Thank you for your consult. I will follow-up with patient. Please contact us if you have any additional questions.    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      Patient presented with severe respiratory distress that started shortly prior to presentation.  Per his wife, he started having worsening shortness of breath despite receiving DuoNebs and she noted that he was choking.  Despite being on 4 oxygen at home, he was hypoxic to 65%.  Additional symptoms include decreased p.o. intake and diarrhea which improved.     In the ED, the patient was tachycardic (up to 160), tachypneic and hypoxic requiring BiPAP.  Labs were remarkable for leukocytosis (32.7), elevated D-dimer (> 33.0), elevated BNP (272), elevated troponin (0.045), elevated lactic acid (6.7) and positive influenza A.  Chest x-ray showed persistent bilateral airspace and pleural opacities, slightly improved in the left upper lung since prior exam.  Left upper extremity ultrasound showed no thrombus in central veins.  CTA chest showed no PE, interval enlargement of known left supraclavicular mass/adenopathy, new masslike consolidation with air bronchograms in the right middle lobe, likely pneumonic process and new right hilar node measuring 26 x 22 mm. He was given DuoNebs x3, 1.9 L of LR, 500 mL of NS, vancomycin, Zosyn, and was admitted for further management.         Hospital Course:  No notes on file        Past Medical History:   Diagnosis Date    Chronic obstructive pulmonary disease with acute exacerbation 9/5/2022    Combined systolic and diastolic heart failure     Dependence on supplemental oxygen 5/24/2022    History of  completed stroke 9/3/2019     09/03/2019 On CT exam    History of non-ST elevation myocardial infarction (NSTEMI) 2/22/2022    Hypertension     Lung cancer     NSCLC    Lung mass     left lung    Macrocytic anemia 8/24/2019    PAD (peripheral artery disease) 3/14/2022    LUIS FELIPE 3/11/22    Peripheral artery disease        Past Surgical History:   Procedure Laterality Date    BRONCHOSCOPY N/A 08/30/2019    Procedure: Bronchoscopy;  Surgeon: [facility] Diagnostic Provider;  Location: [facility] OR [address] [place] [address];  Service: Anesthesiology;  Laterality: N/A;    CORONARY ANGIOGRAPHY N/A 03/04/2022    Procedure: ANGIOGRAM, CORONARY ARTERY;  Surgeon: Robson Green MD;  Location: [facility] CATH LAB;  Service: Cardiology;  Laterality: N/A;    INSERTION OF TUNNELED CENTRAL VENOUS CATHETER (CVC) WITH SUBCUTANEOUS PORT         Review of patient's allergies indicates:  No Known Allergies    Medications:  Continuous Infusions:  Scheduled Meds:   albuterol-ipratropium  3 mL Nebulization Q4H    apixaban  5 mg Oral BID    aspirin  81 mg Oral Daily    atorvastatin  80 mg Oral Daily    cilostazoL  100 mg Oral BID    famotidine (PF)  20 mg Intravenous Daily    furosemide  40 mg Oral BID    metoprolol tartrate  50 mg Oral TID    [START ON 3/17/2023] oseltamivir  75 mg Oral Daily    piperacillin-tazobactam (ZOSYN) IVPB  4.5 g Intravenous Q8H    vancomycin (VANCOCIN) IVPB  1,000 mg Intravenous Q12H     PRN Meds:acetaminophen, albuterol-ipratropium, magnesium oxide, magnesium oxide, melatonin, ondansetron, oxyCODONE, potassium bicarbonate, potassium bicarbonate, potassium bicarbonate, potassium, sodium phosphates, potassium, sodium phosphates, potassium, sodium phosphates, prochlorperazine, sodium chloride 0.9%, Pharmacy to dose Vancomycin consult **AND** vancomycin - pharmacy to dose    Family History       Problem Relation (Age of Onset)    Cancer Father, Sister    Diabetes Mother    Heart defect Mother    No Known Problems Son          Tobacco Use    Smoking  status: Former     Packs/day: 1.00     Years: 25.00     Pack years: 25.00     Types: Cigarettes     Quit date: 2020     Years since quitting: 3.2    Smokeless tobacco: Never   Substance and Sexual Activity    Alcohol use: Yes     Comment: 11/3/22: Occ.    Drug use: Never    Sexual activity: Yes     Partners: Female       Review of Systems   Constitutional:  Positive for activity change and fatigue.   Respiratory:  Positive for shortness of breath.    Neurological:  Positive for weakness.   Objective:     Vital Signs (Most Recent):  Temp: 98.2 °F (36.8 °C) (03/16/23 1101)  Pulse: 86 (03/16/23 1234)  Resp: (!) 24 (03/16/23 1234)  BP: 102/65 (03/16/23 1200)  SpO2: (!) 94 % (03/16/23 1234)   Vital Signs (24h Range):  Temp:  [97.7 °F (36.5 °C)-100.4 °F (38 °C)] 98.2 °F (36.8 °C)  Pulse:  [] 86  Resp:  [] 24  SpO2:  [76 %-100 %] 94 %  BP: ()/() 102/65     Weight: 78.5 kg (173 lb)  Body mass index is 24.82 kg/m².    Physical Exam  Vitals reviewed.   Constitutional:       Appearance: He is ill-appearing and toxic-appearing. He is not diaphoretic.      Comments: Chronically ill appearing   HENT:      Head: Normocephalic and atraumatic.      Right Ear: External ear normal.      Left Ear: External ear normal.   Cardiovascular:      Rate and Rhythm: Tachycardia present.   Pulmonary:      Effort: Respiratory distress present.      Comments: Vapotherm 100%  Musculoskeletal:         General: Swelling present.   Skin:     General: Skin is warm and dry.   Neurological:      Mental Status: He is alert.      Motor: Weakness present.      Comments: Some confusion           Advance Care Planning   Advance Care Planning       Significant Labs: All pertinent labs within the past 24 hours have been reviewed.  CBC:   Recent Labs   Lab 03/16/23 0414   WBC 29.78*   HGB 13.3*   HCT 39.5*   MCV 82        BMP:  Recent Labs   Lab 03/16/23 0414   *   *   K 2.9*   CL 97   CO2 23   BUN 15   CREATININE  1.1   CALCIUM 7.8*   MG 1.9     LFT:  Lab Results   Component Value Date    AST 12 03/16/2023    ALKPHOS 102 03/16/2023    BILITOT 1.1 (H) 03/16/2023     Albumin:   Albumin   Date Value Ref Range Status   03/16/2023 2.6 (L) 3.5 - 5.2 g/dL Final     Protein:   Total Protein   Date Value Ref Range Status   03/16/2023 5.8 (L) 6.0 - 8.4 g/dL Final     Lactic acid:   Lab Results   Component Value Date    LACTATE 3.7 (HH) 03/16/2023    LACTATE 6.7 (HH) 03/15/2023       Significant Imaging: I have reviewed all pertinent imaging results/findings within the past 24 hours. CTA chest, CXR         > 50% of 70 min visit spent in chart review, face to face discussion of goals of care,  symptom assessment, coordination of care and emotional support.    Eli Arango NP  Palliative Medicine  [facility] - [place], [place] - Intensive Care

## 2023-03-17 NOTE — CARE UPDATE
Pt remains in ICU. VSS. Disoriented to place at times. Alert and oriented x 3. Afebrile. Adequate UOP via urinal. Remains on vapotherm. No new falls, injuries, or skin breakdown.

## 2023-03-17 NOTE — PROGRESS NOTES
Pharmacokinetic Assessment Follow Up: IV Vancomycin    Vancomycin serum concentration assessment(s):    The trough level was drawn correctly and can be used to guide therapy at this time. The measurement is within the desired definitive target range of 10 to 20 mcg/mL.    Vancomycin Regimen Plan:    Continue regimen to Vancomycin 1000 mg IV every 12 hours with next serum trough concentration measured at 1030 prior to 3rd dose on 3/18/2023    Drug levels (last 3 results):  Recent Labs   Lab Result Units 03/17/23  1050   Vancomycin-Trough ug/mL 15.8       Pharmacy will continue to follow and monitor vancomycin.    Please contact pharmacy at extension [phone] for questions regarding this assessment.    Thank you for the consult,   Kingston Walker Jr       Patient brief summary:  Kashif Iverson is a 62 y.o. male initiated on antimicrobial therapy with IV Vancomycin for treatment of  pneumonia    Drug Allergies:   Review of patient's allergies indicates:  No Known Allergies    Actual Body Weight:   77.9 kg    Renal Function:   Estimated Creatinine Clearance: 98.9 mL/min (based on SCr of 0.8 mg/dL).,     Dialysis Method (if applicable):  N/A    CBC (last 72 hours):  Recent Labs   Lab Result Units 03/15/23  2212 03/16/23  0414 03/17/23  0340   WBC K/uL 32.75* 29.78* 12.30   Hemoglobin g/dL 15.6 13.3* 12.2*   Hematocrit % 44.8 39.5* 37.0*   Platelets K/uL 287 223 228   Gran % % 88.7* 91.9* 76.7*   Lymph % % 3.5* 2.6* 11.5*   Mono % % 6.8 4.6 11.1   Eosinophil % % 0.0 0.0 0.2   Basophil % % 0.2 0.2 0.2   Differential Method  Automated Automated Automated       Metabolic Panel (last 72 hours):  Recent Labs   Lab Result Units 03/15/23  2329 03/16/23  0414 03/16/23  1201 03/17/23  0340   Sodium mmol/L 136 134*  --  138   Potassium mmol/L 2.9* 2.9*  --  2.9*   Chloride mmol/L 102 97  --  100   CO2 mmol/L 18* 23  --  29   Glucose mg/dL 176* 172*  --  110   Glucose, UA   --   --  Negative  --    BUN mg/dL 15 15  --  13   Creatinine  mg/dL 1.1 1.1  --  0.8   Albumin g/dL 2.6* 2.6*  --  2.6*   Total Bilirubin mg/dL 1.2* 1.1*  --  1.1*   Alkaline Phosphatase U/L 111 102  --  89   AST U/L 14 12  --  11   ALT U/L 12 12  --  10   Magnesium mg/dL 1.2* 1.9  --  1.9   Phosphorus mg/dL  --  2.7  --  2.8       Vancomycin Administrations:  vancomycin given in the last 96 hours                     vancomycin (VANCOCIN) 1,000 mg in dextrose 5 % (D5W) 250 mL IVPB (Vial-Mate) (mg) 1,000 mg New Bag 03/17/23 1123     1,000 mg New Bag 03/16/23 2339     1,000 mg New Bag  1216    vancomycin 1.5 g in dextrose 5 % 250 mL IVPB (ready to mix) ()  Restarted 03/16/23 0047     1,500 mg New Bag 03/15/23 2331                    Microbiologic Results:  Microbiology Results (last 7 days)       Procedure Component Value Units Date/Time    Blood culture #1 **CANNOT BE ORDERED STAT** [[id number]]  (Abnormal) Collected: 03/15/23 2240    Order Status: Completed Specimen: Blood from Peripheral, Hand, Right Updated: 03/17/23 0953     Blood Culture, Routine Gram stain aer bottle: Gram negative rods      Results called to and read back by: Teresa Warren-ICU 03/16/2023  12:03      GRAM NEGATIVE BREE, NON-LACTOSE   Identification and susceptibility pending      Blood culture #2 **CANNOT BE ORDERED STAT** [[id number]] Collected: 03/15/23 2304    Order Status: Completed Specimen: Blood from Peripheral, Wrist, Right Updated: 03/17/23 0303     Blood Culture, Routine No Growth to date      No Growth to date    Blood culture [[id number]] Collected: 03/16/23 1315    Order Status: Completed Specimen: Blood from Antecubital, Left Arm Updated: 03/16/23 2112     Blood Culture, Routine No Growth to date    Blood culture [[id number]] Collected: 03/16/23 1315    Order Status: Completed Specimen: Blood from Antecubital, Left Arm Updated: 03/16/23 2112     Blood Culture, Routine No Growth to date    Culture, Respiratory with Gram Stain [[id number]]     Order Status: No result Specimen: Respiratory

## 2023-03-17 NOTE — PROGRESS NOTES
[facility]  Pulmonology  Progress Note    Patient Name: Kashif Iverson  MRN: [id number]  Admission Date: 3/15/2023  Hospital Length of Stay: 1 days  Code Status: Full Code  Attending Provider: Jennifer Singleton MD  Primary Care Provider: Eduardo Ruiz MD   Principal Problem: Acute on chronic respiratory failure with hypoxia    Subjective:     Interval History: Blood culture w/ GNR. Oxygenation requirements stable at 20/60. Mental status not quite back to baseline so CT Head is pending.       Objective:     Vital Signs (Most Recent):  Temp: 98.2 °F (36.8 °C) (03/17/23 0701)  Pulse: 82 (03/17/23 1000)  Resp: (!) 27 (03/17/23 1000)  BP: 101/66 (03/17/23 1000)  SpO2: (!) 94 % (03/17/23 1000)   Vital Signs (24h Range):  Temp:  [97.7 °F (36.5 °C)-98.9 °F (37.2 °C)] 98.2 °F (36.8 °C)  Pulse:  [] 82  Resp:  [19-77] 27  SpO2:  [83 %-98 %] 94 %  BP: ()/(52-76) 101/66     Weight: 77.9 kg (171 lb 11.8 oz)  Body mass index is 24.64 kg/m².      Intake/Output Summary (Last 24 hours) at 3/17/2023 1051  Last data filed at 3/17/2023 1000  Gross per 24 hour   Intake 831.96 ml   Output 2050 ml   Net -1218.04 ml       Physical Exam  Vitals and nursing note reviewed. Exam conducted with a chaperone present.   Constitutional:       Appearance: He is not diaphoretic.      Comments: Responses are slow. No other focal neuro deficits   Eyes:      Pupils: Pupils are equal, round, and reactive to light.   Cardiovascular:      Rate and Rhythm: Normal rate and regular rhythm.   Pulmonary:      Effort: No respiratory distress.      Breath sounds: Rhonchi and rales present. No wheezing.   Skin:     Capillary Refill: Capillary refill takes less than 2 seconds.     Review of Systems    Vents:  Oxygen Concentration (%): 60 (03/17/23 0900)    Lines/Drains/Airways       Central Venous Catheter Line  Duration             Port A Cath Single Lumen right subclavian -- days              Peripheral Intravenous Line  Duration                   Peripheral IV - Single Lumen 03/17/23 0806 20 G Anterior;Proximal;Right Forearm <1 day                    Significant Labs:    CBC/Anemia Profile:  Recent Labs   Lab 03/15/23  2212 03/15/23  2243 03/16/23  0414 03/17/23  0340   WBC 32.75*  --  29.78* 12.30   HGB 15.6  --  13.3* 12.2*   HCT 44.8 51 39.5* 37.0*     --  223 228   MCV 82  --  82 85   RDW 15.7*  --  15.6* 16.0*        Chemistries:  Recent Labs   Lab 03/15/23  2329 03/16/23  0414 03/17/23  0340    134* 138   K 2.9* 2.9* 2.9*    97 100   CO2 18* 23 29   BUN 15 15 13   CREATININE 1.1 1.1 0.8   CALCIUM 7.3* 7.8* 8.4*   ALBUMIN 2.6* 2.6* 2.6*   PROT 5.9* 5.8* 5.8*   BILITOT 1.2* 1.1* 1.1*   ALKPHOS 111 102 89   ALT 12 12 10   AST 14 12 11   MG 1.2* 1.9 1.9   PHOS  --  2.7 2.8       All pertinent labs within the past 24 hours have been reviewed.    Significant Imaging:  I have reviewed all pertinent imaging results/findings within the past 24 hours.      ABG  Recent Labs   Lab 03/15/23  2302   PH 7.468*   PO2 68*   PCO2 28.2*   HCO3 20.4*   BE -2     Assessment/Plan:     Pulmonary  * Acute on chronic respiratory failure with hypoxia  Baseline 4L of oxygen at home 2/2 COPD/Bronchiectasis. Acute worsening 2/2 RML PNA d/t Flu A with possible secondary bacterial infection.     He reports that is it not on maintenance inhaler therapy. Simply takes nebs at home for his COPD.    - sputum culture pending  - Agree w/ tamiflu and BSAbx  - Duonebs scheduled  - Oxygenation imiproving. Goal sat >88%  - Will need to start on triple therapy inhaler (Breztri or Trelegy) after he recovers from this PNA  - Agree w/ Palliative involvement    COPD (chronic obstructive pulmonary disease)  Per acute on chronic hypoxemic RF    Oncology  Lung cancer, main bronchus, left  Stage IV. Follows w/ Dr. Higgins. Most recent clinic visit on 2/28. Discussion at that time was that patient's cancer is progressing despite therapies and in the setting of recurrent admissions  and declining respiratory status, hospice was recommended         I will sign off. Please call with further questions    John Norton MD  Pulmonology  [facility] [place] - Intensive [facility]

## 2023-03-17 NOTE — ASSESSMENT & PLAN NOTE
Patient admitted with Hypoxic which is Acute on Chronic.  he is on home oxygen at 4 LPM. Signs/symptoms of respiratory failure include- tachypnea, increased work of breathing, respiratory distress, use of accessory muscles and wheezing present on admission.   - Labs and images were reviewed. Patient Has not has a recent ABG.   - Supplemental oxygen was provided and noted- vapotherm   - Respiratory failure is due to- Pneumonia   - treatment: see influenza and sepsis

## 2023-03-17 NOTE — ASSESSMENT & PLAN NOTE
Prior CTH 11/2022 shows R parietal and frontal infarct. No new neurologic deficit on exam but remains somewhat altered  - will check CTH  - continue asa, eliquis, statin

## 2023-03-17 NOTE — HOSPITAL COURSE
Mr Kashif Iverson was admitted with acute on chronic hypoxic respiratory failure secondary to pneumonia and influenza A. Started antibitoics and tamiflu. Started vapotherm. Pulmonary following. He has had some confusion; CTH no acute changes. Improved respiratory status. Weaned to 5L NC. Blood cultures from admit with Acinetobacter bacteremia which has cleared. ID consulted. Stepped down to floor.  PT/OT consulted and rec: H/H on discharge. ID recs Cefepime through 3/28. Patient already on 4L of 02 at home.  Activity as tolerated. Diet- low NA. Follow up PCP in one week.

## 2023-03-17 NOTE — ASSESSMENT & PLAN NOTE
He seems somewhat confused. Cannot remember the status of his lung cancer. Oriented to self and location but can't remember why he is in the hospital. He does have history of strokes. Other care providers state he has been confused like this on past admissions. This may be due to sepsis  - will check CTH out of caution

## 2023-03-17 NOTE — CONSULTS
"2017:   Consult was conducted with the patient his medical provider and his wife.  Medical provider reports that at times patient presents as disoriented. She also stated that the patient's wife was visiting him now.  My visit with the patient was one of enjoyment,even though ill the patient was joking and laughing. His wife stated that she and her  are "[employer] fans!  The patient even sang a stanza of one of his songs asking me if I remembered the song?   Prayers were offered for the patient and his wife.  The Spiritual Care Team will continue to provide spiritual support to the patient and his family.        RONAN Mejia   [phone]  "

## 2023-03-17 NOTE — ASSESSMENT & PLAN NOTE
Advance Care Planning     Date: 03/16/2023    Code Status  Dr Marroquin discussed the patient's code status with the patient and his wife.  He wants to remain full code at this time.  Dr Marroquin spent a total of 2 minutes engaging the patient in this advance care planning discussion.  - Palliative care consulted.

## 2023-03-17 NOTE — ASSESSMENT & PLAN NOTE
This patient does have evidence of infective focus  My overall impression is severe sepsis.  Source: Respiratory  Antibiotics given-   Antibiotics (72h ago, onward)    Start     Stop Route Frequency Ordered    03/16/23 1130  vancomycin (VANCOCIN) 1,000 mg in dextrose 5 % (D5W) 250 mL IVPB (Vial-Mate)         -- IV Every 12 hours (non-standard times) 03/16/23 0301    03/16/23 0700  piperacillin-tazobactam (ZOSYN) 4.5 g in dextrose 5 % in water (D5W) 5 % 100 mL IVPB (MB+)  (ED Adult Sepsis Treatment)         -- IV Every 8 hours (non-standard times) 03/16/23 0222    03/16/23 0322  vancomycin - pharmacy to dose  (vancomycin IVPB)        See Hyperspace for full Linked Orders Report.    -- IV pharmacy to manage frequency 03/16/23 0222        Latest lactate reviewed-  Recent Labs   Lab 03/15/23  2212 03/16/23  0134   LACTATE 6.7* 3.7*     Organ dysfunction indicated by Acute respiratory failure and Encephalopathy    Fluid challenge Actual Body weight- Patient will receive 30ml/kg actual body weight to calculate fluid bolus for treatment of septic shock.     Post- resuscitation assessment Yes Perfusion exam was performed within 6 hours of septic shock presentation after bolus shows Adequate tissue perfusion assessed by non-invasive monitoring       Will Not start Pressors- Levophed for MAP of 65  Source control achieved by: antibiotics

## 2023-03-17 NOTE — ASSESSMENT & PLAN NOTE
He follows with Oncology. He was previously receiving palliative chemotherapy. Per most recently Oncology notes, he is no longer a candidate for chemotherapy.

## 2023-03-17 NOTE — PLAN OF CARE
Problem: Adult Inpatient Plan of Care  Goal: Plan of Care Review  Outcome: Ongoing, Progressing  Goal: Patient-Specific Goal (Individualized)  Outcome: Ongoing, Progressing  Goal: Absence of Hospital-Acquired Illness or Injury  Outcome: Ongoing, Progressing  Goal: Optimal Comfort and Wellbeing  Outcome: Ongoing, Progressing  Goal: Readiness for Transition of Care  Outcome: Ongoing, Progressing     Problem: Fluid Imbalance (Pneumonia)  Goal: Fluid Balance  Outcome: Ongoing, Progressing     Problem: Infection (Pneumonia)  Goal: Resolution of Infection Signs and Symptoms  Outcome: Ongoing, Progressing     Problem: Respiratory Compromise (Pneumonia)  Goal: Effective Oxygenation and Ventilation  Outcome: Ongoing, Progressing     Problem: Infection  Goal: Absence of Infection Signs and Symptoms  Outcome: Ongoing, Progressing     Problem: Skin Injury Risk Increased  Goal: Skin Health and Integrity  Outcome: Ongoing, Progressing     Problem: Coping Ineffective  Goal: Effective Coping  Outcome: Ongoing, Progressing     Problem: Adjustment to Illness (Sepsis/Septic Shock)  Goal: Optimal Coping  Outcome: Ongoing, Progressing     Problem: Bleeding (Sepsis/Septic Shock)  Goal: Absence of Bleeding  Outcome: Ongoing, Progressing     Problem: Glycemic Control Impaired (Sepsis/Septic Shock)  Goal: Blood Glucose Level Within Desired Range  Outcome: Ongoing, Progressing     Problem: Infection Progression (Sepsis/Septic Shock)  Goal: Absence of Infection Signs and Symptoms  Outcome: Ongoing, Progressing     Problem: Nutrition Impaired (Sepsis/Septic Shock)  Goal: Optimal Nutrition Intake  Outcome: Ongoing, Progressing

## 2023-03-17 NOTE — ASSESSMENT & PLAN NOTE
Blood cultures from admit with GNR bacteremia  - repeat cultures ordered  - speciation pending  - continue antibiotics

## 2023-03-17 NOTE — ASSESSMENT & PLAN NOTE
BP currently well controlled  Continue home metoprolol. Holding home amlodipine for today, may resume tomorrow pending BP trend  Monitor BP

## 2023-03-17 NOTE — CARE UPDATE
[facility]  Nurses Note -- 4 Eyes      3/17/2023       Skin assessed on: Q Shift      [x] No Pressure Injuries Present    [x]Prevention Measures Documented    [] Yes LDA  for Pressure Injury Previously documented     [] Yes New Pressure Injury Discovered   [] LDA for New Pressure Injury Added      Attending RN:  Myrna Ortez RN     Second RN:  Shania Herrera RN

## 2023-03-18 NOTE — ASSESSMENT & PLAN NOTE
Results for orders placed during the hospital encounter of 09/28/22    Echo    Interpretation Summary  · The left ventricle is normal in size with concentric hypertrophy and normal systolic function.  · The estimated ejection fraction is 60%.  · Normal left ventricular diastolic function.  · Mild right ventricular enlargement with mildly reduced right ventricular systolic function.  · Mild left atrial enlargement.  · Mild right atrial enlargement.  · Normal central venous pressure (3 mmHg).  · The estimated PA systolic pressure is 40 mmHg.  · Trivial posterior pericardial effusion.    No signs of acute exacerbation  BNP  Recent Labs   Lab 03/15/23  2212   *     BNP was mildly elevated on admit. Currently continues on PO lasix   Note: previously did have mixed systolic/diastolic CHF with EF down to 35% (2/2022). EF has recovered.   - continues on BB  - not on ACEi/ARB/entersto/spironolactone as outpatient. Will not start now

## 2023-03-18 NOTE — ASSESSMENT & PLAN NOTE
Blood cultures from admit with GNR bacteremia  - repeat cultures ordered  - speciation still pending  - continue antibiotics

## 2023-03-18 NOTE — PROGRESS NOTES
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     Patient presented with severe respiratory distress that started shortly prior to presentation.  Per his wife, he started having worsening shortness of breath despite receiving DuoNebs and she noted that he was choking.  Despite being on 4 oxygen at home, he was hypoxic to 65%.  Additional symptoms include decreased p.o. intake and diarrhea which improved.    In the ED, the patient was tachycardic (up to 160), tachypneic and hypoxic requiring BiPAP.  Labs were remarkable for leukocytosis (32.7), elevated D-dimer (> 33.0), elevated BNP (272), elevated troponin (0.045), elevated lactic acid (6.7) and positive influenza A.  Chest x-ray showed persistent bilateral airspace and pleural opacities, slightly improved in the left upper lung since prior exam.  Left upper extremity ultrasound showed no thrombus in central veins.  CTA chest showed no PE, interval enlargement of known left supraclavicular mass/adenopathy, new masslike consolidation with air bronchograms in the right middle lobe, likely pneumonic process and new right hilar node measuring 26 x 22 mm..  He was given DuoNebs x3, 1.9 L of LR, 500 mL of NS, vancomycin, Zosyn, and was admitted for further management.          Overview/Hospital Course:  Mr Kashif Iverson was admitted with acute on chronic hypoxic respiratory  failure secondary to pneumonia and influenza A. Started antibitoics and tamiflu. Started vapotherm. Pulmonary following. Requirements improving. He has had some confusion; CTH ordered.       Interval History: Feeling well this morning. No shortness of breath, cough, chest pain. Less confused this morning.     Review of Systems   Constitutional:  Negative for chills and fever.   Respiratory:  Negative for cough and shortness of breath.    Cardiovascular:  Negative for chest pain, palpitations and leg swelling.   Gastrointestinal:  Negative for abdominal pain, constipation, diarrhea, nausea and vomiting.   Genitourinary:  Negative for difficulty urinating.   Neurological:  Negative for weakness and numbness.   Psychiatric/Behavioral:  Positive for confusion.    Objective:     Vital Signs (Most Recent):  Temp: 98.1 °F (36.7 °C) (03/18/23 0701)  Pulse: 75 (03/18/23 1030)  Resp: (!) 23 (03/18/23 1030)  BP: 133/76 (03/18/23 1030)  SpO2: (!) 94 % (03/18/23 1030) Vital Signs (24h Range):  Temp:  [98.1 °F (36.7 °C)-98.4 °F (36.9 °C)] 98.1 °F (36.7 °C)  Pulse:  [] 75  Resp:  [20-92] 23  SpO2:  [88 %-98 %] 94 %  BP: ()/() 133/76     Weight: 77.9 kg (171 lb 11.8 oz)  Body mass index is 24.64 kg/m².    Intake/Output Summary (Last 24 hours) at 3/18/2023 1102  Last data filed at 3/18/2023 1101  Gross per 24 hour   Intake 751.25 ml   Output 1650 ml   Net -898.75 ml        Physical Exam  Vitals and nursing note reviewed.   Constitutional:       General: He is not in acute distress.     Appearance: He is ill-appearing. He is not toxic-appearing.   HENT:      Head: Normocephalic and atraumatic.      Nose: Nose normal.      Mouth/Throat:      Mouth: Mucous membranes are moist.   Cardiovascular:      Rate and Rhythm: Normal rate and regular rhythm.   Pulmonary:      Effort: Pulmonary effort is normal.      Breath sounds: No wheezing or rhonchi.      Comments: Vapotherm 20/60, diminished  Abdominal:      General: Bowel  sounds are normal. There is no distension.      Palpations: Abdomen is soft. There is no mass.      Tenderness: There is no abdominal tenderness. There is no guarding.   Musculoskeletal:         General: Swelling (L arm) present.      Right lower leg: No edema.      Left lower leg: No edema.   Skin:     General: Skin is warm and dry.      Findings: Bruising present.   Neurological:      Mental Status: He is alert.      Comments: Oriented to self, location, situation       Significant Labs: All pertinent labs within the past 24 hours have been reviewed.    Significant Imaging: I have reviewed all pertinent imaging results/findings within the past 24 hours.      Assessment/Plan:      * Acute on chronic respiratory failure with hypoxia  Patient admitted with Hypoxic which is Acute on Chronic.  he is on home oxygen at 4 LPM. Signs/symptoms of respiratory failure include- tachypnea, increased work of breathing, respiratory distress, use of accessory muscles and wheezing present on admission.   - Labs and images were reviewed. Patient Has not has a recent ABG.   - Supplemental oxygen was provided and noted- HFNC   - Respiratory failure is due to- Pneumonia   - treatment: see influenza and sepsis  - he is improving       Pneumonia  Influenza A + suspected bacterial pneumonia as well. Unable to collect sputum culture. Blood cultures with GNR  - continue antibiotics and tamiflu  - follow up blood cultures       Bacteremia  Blood cultures from admit with GNR bacteremia  - repeat cultures ordered  - speciation still pending  - continue antibiotics       Prolonged Q-T interval on ECG  Noted- monitor       Influenza A  Contributes to his respiratory failure   - continue Tamiflu x5 days      Advanced care planning/counseling discussion  Advance Care Planning     Date: 03/16/2023    Code Status  Dr Marroquin discussed the patient's code status with the patient and his wife.  He wants to remain full code at this time.  Dr Marroquin spent  a total of 2 minutes engaging the patient in this advance care planning discussion.  - Palliative care consulted.            Persistent atrial fibrillation  [id number] Score: 3   Continue home metoprolol and Eliquis  Telemetry monitoring    COPD (chronic obstructive pulmonary disease)  No signs of current exacerbation  - continue nebs  - will not start steroids at this point         Severe sepsis  This patient does have evidence of infective focus  My overall impression is severe sepsis.  Source: Respiratory  Antibiotics given-   Antibiotics (72h ago, onward)    Start     Stop Route Frequency Ordered    03/16/23 1130  vancomycin (VANCOCIN) 1,000 mg in dextrose 5 % (D5W) 250 mL IVPB (Vial-Mate)         -- IV Every 12 hours (non-standard times) 03/16/23 0301    03/16/23 0700  piperacillin-tazobactam (ZOSYN) 4.5 g in dextrose 5 % in water (D5W) 5 % 100 mL IVPB (MB+)  (ED Adult Sepsis Treatment)         -- IV Every 8 hours (non-standard times) 03/16/23 0222    03/16/23 0322  vancomycin - pharmacy to dose  (vancomycin IVPB)        See Hyperspace for full Linked Orders Report.    -- IV pharmacy to manage frequency 03/16/23 0222        Latest lactate reviewed-  Recent Labs   Lab 03/15/23  2212 03/16/23  0134   LACTATE 6.7* 3.7*     Organ dysfunction indicated by Acute respiratory failure and Encephalopathy    Fluid challenge Actual Body weight- Patient will receive 30ml/kg actual body weight to calculate fluid bolus for treatment of septic shock.     Post- resuscitation assessment Yes Perfusion exam was performed within 6 hours of septic shock presentation after bolus shows Adequate tissue perfusion assessed by non-invasive monitoring       Will Not start Pressors- Levophed for MAP of 65  Source control achieved by: antibiotics   - he is improving     Coronary artery disease involving native coronary artery of native heart without angina pectoris  No active chest pain or signs of ACS.   - continue asa, statin    PAD  (peripheral artery disease)  No signs of lower extremity ulceration/wound  Continue asa, statin       Acute encephalopathy  He seems somewhat confused. Cannot remember the status of his lung cancer. Oriented to self and location but can't remember why he is in the hospital. He does have history of strokes. Other care providers state he has been confused like this on past admissions. This may be due to sepsis  - this is improved  - will check CTH out of caution- read pending      Chronic diastolic heart failure  Results for orders placed during the hospital encounter of 09/28/22    Echo    Interpretation Summary  · The left ventricle is normal in size with concentric hypertrophy and normal systolic function.  · The estimated ejection fraction is 60%.  · Normal left ventricular diastolic function.  · Mild right ventricular enlargement with mildly reduced right ventricular systolic function.  · Mild left atrial enlargement.  · Mild right atrial enlargement.  · Normal central venous pressure (3 mmHg).  · The estimated PA systolic pressure is 40 mmHg.  · Trivial posterior pericardial effusion.    No signs of acute exacerbation  BNP  Recent Labs   Lab 03/15/23  2212   *     BNP was mildly elevated on admit. Currently continues on PO lasix   Note: previously did have mixed systolic/diastolic CHF with EF down to 35% (2/2022). EF has recovered.   - continues on BB  - not on ACEi/ARB/entersto/spironolactone as outpatient. Will not start now       Hypokalemia  Replace and monitor  Lab Results   Component Value Date    K 3.2 (L) 03/18/2023           Essential hypertension  BP currently well controlled  Continue home metoprolol. Holding home amlodipine for today, may resume tomorrow pending BP trend  Monitor BP        Lung cancer, main bronchus, left  He follows with Oncology. He was previously receiving palliative chemotherapy. Per most recently Oncology notes, he is no longer a candidate for chemotherapy.       History  of completed stroke  Prior CTH 11/2022 shows R parietal and frontal infarct. No new neurologic deficit on exam but remains somewhat altered  - will check CTH- read pending  - continue asa, eliquis, statin         VTE Risk Mitigation (From admission, onward)         Ordered     apixaban tablet 5 mg  2 times daily         03/16/23 0221     IP VTE HIGH RISK PATIENT  Once         03/16/23 0221     Place sequential compression device  Until discontinued         03/16/23 0221                Discharge Planning   [place]: 3/21/2023     Code Status: Full Code   Is the patient medically ready for discharge?:     Reason for patient still in hospital (select all that apply): Patient trending condition  Discharge Plan A: Home with family            Critical care time spent on the evaluation and treatment of severe organ dysfunction, review of pertinent labs and imaging studies, discussions with consulting providers and discussions with patient/family: 30 minutes.      Jennifer Singleton MD  Department of Hospital Medicine   [facility] - [place] - Intensive Care

## 2023-03-18 NOTE — SUBJECTIVE & OBJECTIVE
Interval History: Feeling well this morning. No shortness of breath, cough, chest pain. Less confused this morning.     Review of Systems   Constitutional:  Negative for chills and fever.   Respiratory:  Negative for cough and shortness of breath.    Cardiovascular:  Negative for chest pain, palpitations and leg swelling.   Gastrointestinal:  Negative for abdominal pain, constipation, diarrhea, nausea and vomiting.   Genitourinary:  Negative for difficulty urinating.   Neurological:  Negative for weakness and numbness.   Psychiatric/Behavioral:  Positive for confusion.    Objective:     Vital Signs (Most Recent):  Temp: 98.1 °F (36.7 °C) (03/18/23 0701)  Pulse: 75 (03/18/23 1030)  Resp: (!) 23 (03/18/23 1030)  BP: 133/76 (03/18/23 1030)  SpO2: (!) 94 % (03/18/23 1030) Vital Signs (24h Range):  Temp:  [98.1 °F (36.7 °C)-98.4 °F (36.9 °C)] 98.1 °F (36.7 °C)  Pulse:  [] 75  Resp:  [20-92] 23  SpO2:  [88 %-98 %] 94 %  BP: ()/() 133/76     Weight: 77.9 kg (171 lb 11.8 oz)  Body mass index is 24.64 kg/m².    Intake/Output Summary (Last 24 hours) at 3/18/2023 1102  Last data filed at 3/18/2023 1101  Gross per 24 hour   Intake 751.25 ml   Output 1650 ml   Net -898.75 ml        Physical Exam  Vitals and nursing note reviewed.   Constitutional:       General: He is not in acute distress.     Appearance: He is ill-appearing. He is not toxic-appearing.   HENT:      Head: Normocephalic and atraumatic.      Nose: Nose normal.      Mouth/Throat:      Mouth: Mucous membranes are moist.   Cardiovascular:      Rate and Rhythm: Normal rate and regular rhythm.   Pulmonary:      Effort: Pulmonary effort is normal.      Breath sounds: No wheezing or rhonchi.      Comments: Vapotherm 20/60, diminished  Abdominal:      General: Bowel sounds are normal. There is no distension.      Palpations: Abdomen is soft. There is no mass.      Tenderness: There is no abdominal tenderness. There is no guarding.   Musculoskeletal:          General: Swelling (L arm) present.      Right lower leg: No edema.      Left lower leg: No edema.   Skin:     General: Skin is warm and dry.      Findings: Bruising present.   Neurological:      Mental Status: He is alert.      Comments: Oriented to self, location, situation       Significant Labs: All pertinent labs within the past 24 hours have been reviewed.    Significant Imaging: I have reviewed all pertinent imaging results/findings within the past 24 hours.   Speaking Coherently

## 2023-03-18 NOTE — PROGRESS NOTES
Pharmacokinetic Assessment Follow Up: IV Vancomycin    Vancomycin serum concentration assessment(s):    The trough level was drawn correctly and can be used to guide therapy at this time. The measurement is within the desired definitive target range of 10 to 20 mcg/mL.    Vancomycin Regimen Plan:    Continue regimen to Vancomycin 1000 mg IV every 12 hours with next serum trough concentration measured at 22:30 prior to 4th dose on 3/19    Drug levels (last 3 results):  Recent Labs   Lab Result Units 03/17/23  1050 03/18/23  1031   Vancomycin-Trough ug/mL 15.8 16.8       Pharmacy will continue to follow and monitor vancomycin.    Please contact pharmacy at extension [phone] for questions regarding this assessment.    Thank you for the consult,   Geri Soto       Patient brief summary:  Kashif Iverson is a 62 y.o. male initiated on antimicrobial therapy with IV Vancomycin for treatment of  pneumonia    The patient's current regimen is Vancomycin 1000mg IV q12hr    Drug Allergies:   Review of patient's allergies indicates:  No Known Allergies    Actual Body Weight:   77.9 kg    Renal Function:   Estimated Creatinine Clearance: 98.9 mL/min (based on SCr of 0.8 mg/dL).,     Dialysis Method (if applicable):  N/A    CBC (last 72 hours):  Recent Labs   Lab Result Units 03/15/23  2212 03/16/23  0414 03/17/23  0340 03/18/23  0321   WBC K/uL 32.75* 29.78* 12.30 11.91   Hemoglobin g/dL 15.6 13.3* 12.2* 11.6*   Hematocrit % 44.8 39.5* 37.0* 35.5*   Platelets K/uL 287 223 228 243   Gran % % 88.7* 91.9* 76.7* 76.0*   Lymph % % 3.5* 2.6* 11.5* 12.2*   Mono % % 6.8 4.6 11.1 9.8   Eosinophil % % 0.0 0.0 0.2 0.8   Basophil % % 0.2 0.2 0.2 0.7   Differential Method  Automated Automated Automated Automated       Metabolic Panel (last 72 hours):  Recent Labs   Lab Result Units 03/15/23  2329 03/16/23  0414 03/16/23  1201 03/17/23  0340 03/18/23  0321   Sodium mmol/L 136 134*  --  138 138   Potassium mmol/L 2.9* 2.9*  --  2.9* 3.2*    Chloride mmol/L 102 97  --  100 101   CO2 mmol/L 18* 23  --  29 28   Glucose mg/dL 176* 172*  --  110 128*   Glucose, UA   --   --  Negative  --   --    BUN mg/dL 15 15  --  13 11   Creatinine mg/dL 1.1 1.1  --  0.8 0.8   Albumin g/dL 2.6* 2.6*  --  2.6* 2.4*   Total Bilirubin mg/dL 1.2* 1.1*  --  1.1* 0.7   Alkaline Phosphatase U/L 111 102  --  89 84   AST U/L 14 12  --  11 19   ALT U/L 12 12  --  10 15   Magnesium mg/dL 1.2* 1.9  --  1.9 1.8   Phosphorus mg/dL  --  2.7  --  2.8  --        Vancomycin Administrations:  vancomycin given in the last 96 hours                     vancomycin (VANCOCIN) 1,000 mg in dextrose 5 % (D5W) 250 mL IVPB (Vial-Mate) ()  Restarted 03/17/23 2329     1,000 mg New Bag  2311     1,000 mg New Bag  1123     1,000 mg New Bag 03/16/23 2339     1,000 mg New Bag  1216    vancomycin 1.5 g in dextrose 5 % 250 mL IVPB (ready to mix) ()  Restarted 03/16/23 0047     1,500 mg New Bag 03/15/23 2331                    Microbiologic Results:  Microbiology Results (last 7 days)       Procedure Component Value Units Date/Time    Blood culture #2 **CANNOT BE ORDERED STAT** [[id number]] Collected: 03/15/23 2304    Order Status: Completed Specimen: Blood from Peripheral, Wrist, Right Updated: 03/18/23 0303     Blood Culture, Routine No Growth to date      No Growth to date      No Growth to date    Blood culture [[id number]] Collected: 03/16/23 1315    Order Status: Completed Specimen: Blood from Antecubital, Left Arm Updated: 03/17/23 1503     Blood Culture, Routine No Growth to date      No Growth to date    Blood culture [[id number]] Collected: 03/16/23 1315    Order Status: Completed Specimen: Blood from Antecubital, Left Arm Updated: 03/17/23 1503     Blood Culture, Routine No Growth to date      No Growth to date    Blood culture #1 **CANNOT BE ORDERED STAT** [[id number]]  (Abnormal) Collected: 03/15/23 9400    Order Status: Completed Specimen: Blood from Peripheral, Hand, Right Updated: 03/17/23 0953      Blood Culture, Routine Gram stain aer bottle: Gram negative rods      Results called to and read back by: Teresa Warren-ICU 03/16/2023  12:03      GRAM NEGATIVE BREE, NON-LACTOSE   Identification and susceptibility pending      Culture, Respiratory with Gram Stain [[id number]]     Order Status: No result Specimen: Respiratory

## 2023-03-18 NOTE — ASSESSMENT & PLAN NOTE
Prior CTH 11/2022 shows R parietal and frontal infarct. No new neurologic deficit on exam but remains somewhat altered  - will check CTH- read pending  - continue asa, eliquis, statin

## 2023-03-18 NOTE — PLAN OF CARE
Pt remains in ICU alert and disoriented to place and situation. HR running NSR on monitor. BP stable. PT on vapotherm 20L, 60%. Normothermic. PO medication given, swallowed without difficulty. IV antibiotics given. Potassium replaced this shift. Voids via urinal. Updated on plan of care. No new falls, injuries, or skin breakdown this shift.

## 2023-03-18 NOTE — ASSESSMENT & PLAN NOTE
This patient does have evidence of infective focus  My overall impression is severe sepsis.  Source: Respiratory  Antibiotics given-   Antibiotics (72h ago, onward)    Start     Stop Route Frequency Ordered    03/16/23 1130  vancomycin (VANCOCIN) 1,000 mg in dextrose 5 % (D5W) 250 mL IVPB (Vial-Mate)         -- IV Every 12 hours (non-standard times) 03/16/23 0301    03/16/23 0700  piperacillin-tazobactam (ZOSYN) 4.5 g in dextrose 5 % in water (D5W) 5 % 100 mL IVPB (MB+)  (ED Adult Sepsis Treatment)         -- IV Every 8 hours (non-standard times) 03/16/23 0222    03/16/23 0322  vancomycin - pharmacy to dose  (vancomycin IVPB)        See Hyperspace for full Linked Orders Report.    -- IV pharmacy to manage frequency 03/16/23 0222        Latest lactate reviewed-  Recent Labs   Lab 03/15/23  2212 03/16/23  0134   LACTATE 6.7* 3.7*     Organ dysfunction indicated by Acute respiratory failure and Encephalopathy    Fluid challenge Actual Body weight- Patient will receive 30ml/kg actual body weight to calculate fluid bolus for treatment of septic shock.     Post- resuscitation assessment Yes Perfusion exam was performed within 6 hours of septic shock presentation after bolus shows Adequate tissue perfusion assessed by non-invasive monitoring       Will Not start Pressors- Levophed for MAP of 65  Source control achieved by: antibiotics   - he is improving

## 2023-03-18 NOTE — ASSESSMENT & PLAN NOTE
Patient admitted with Hypoxic which is Acute on Chronic.  he is on home oxygen at 4 LPM. Signs/symptoms of respiratory failure include- tachypnea, increased work of breathing, respiratory distress, use of accessory muscles and wheezing present on admission.   - Labs and images were reviewed. Patient Has not has a recent ABG.   - Supplemental oxygen was provided and noted- HFNC   - Respiratory failure is due to- Pneumonia   - treatment: see influenza and sepsis  - he is improving

## 2023-03-19 PROBLEM — A49.8 ACINETOBACTER LWOFFI INFECTION: Status: ACTIVE | Noted: 2023-01-01

## 2023-03-19 NOTE — PLAN OF CARE
Patient remains in ICU on 5L of nasal cannula. VSS and afebrile. Adequate urine output per urinal, BM x1 by up to toilet. Patient refused to work with OT per therapist handoff. Patient updated on plan of care per MD. Free of injury, falls, and skin breakdown on this shift.

## 2023-03-19 NOTE — ASSESSMENT & PLAN NOTE
This patient does have evidence of infective focus  My overall impression is severe sepsis.  Source: Respiratory  Antibiotics given-   Antibiotics (72h ago, onward)    Start     Stop Route Frequency Ordered    03/16/23 1130  vancomycin (VANCOCIN) 1,000 mg in dextrose 5 % (D5W) 250 mL IVPB (Vial-Mate)         -- IV Every 12 hours (non-standard times) 03/16/23 0301    03/16/23 0700  piperacillin-tazobactam (ZOSYN) 4.5 g in dextrose 5 % in water (D5W) 5 % 100 mL IVPB (MB+)  (ED Adult Sepsis Treatment)         -- IV Every 8 hours (non-standard times) 03/16/23 0222    03/16/23 0322  vancomycin - pharmacy to dose  (vancomycin IVPB)        See Hyperspace for full Linked Orders Report.    -- IV pharmacy to manage frequency 03/16/23 0222        Latest lactate reviewed-  No results for input(s): LACTATE in the last 72 hours.  Organ dysfunction indicated by Acute respiratory failure and Encephalopathy    Fluid challenge Actual Body weight- Patient will receive 30ml/kg actual body weight to calculate fluid bolus for treatment of septic shock.     Post- resuscitation assessment Yes Perfusion exam was performed within 6 hours of septic shock presentation after bolus shows Adequate tissue perfusion assessed by non-invasive monitoring       Will Not start Pressors- Levophed for MAP of 65  Source control achieved by: antibiotics   - he is improving   - blood cultures with Acinetobacter-- ID consulted

## 2023-03-19 NOTE — NURSING
[facility] Intensive Care  ICU Shift Summary  Date: 3/19/2023      Prehospitalization: Home  Admit Date / LOS : 3/15/2023/ 3 days    Diagnosis: Acute on chronic respiratory failure with hypoxia    Consults:        Active: Cardio       Needed: Pulm CC     Code Status: Full Code   Advanced Directive: <no information>    LDA:  Lines/Drains/Airways       Central Venous Catheter Line  Duration             Port A Cath Single Lumen right subclavian -- days              Peripheral Intravenous Line  Duration                  Peripheral IV - Single Lumen 03/17/23 0806 20 G Anterior;Proximal;Right Forearm 1 day                  Central Lines/Site/Justification:Patient Does Not Have Central Line  Urinary Cath/Order/Justification:Patient Does Not Have Urinary Catheter    Vasopressors/Infusions:        GOALS: Volume/ Hemodynamic: SBP < 160                     RASS: N/A    Pain Management: none       Pain Controlled: yes     Rhythm: NSR    Respiratory Device: HFNC    Oxygen Concentration (%):  [70] 70             Most Recent SBT/ SAT: N/A       MOVE Screen: PASS  ICU Liberation: yes    VTE Prophylaxis: Mechanical  Mobility: Bedrest  Stress Ulcer Prophylaxis: Yes    Isolation: Droplet    Dietary:   Current Diet Order   Procedures    Diet Cardiac      Tolerance: yes  Advancement: @ goal    I & O (24h):    Intake/Output Summary (Last 24 hours) at 3/19/2023 0523  Last data filed at 3/19/2023 0400  Gross per 24 hour   Intake 852.3 ml   Output 1550 ml   Net -697.7 ml        Restraints: No    Significant Dates:  Post Op Date: N/A  Rescue Date: N/A  Imaging/ Diagnostics: N/A    Noteworthy Labs:  see chart below    COVID Test: (--)  CBC/Anemia Labs: Coags:    Recent Labs   Lab 03/18/23  0321 03/19/23  0339   WBC 11.91 10.02   HGB 11.6* 12.2*   HCT 35.5* 37.2*    268   MCV 86 87   RDW 16.0* 16.1*    Recent Labs   Lab 03/15/23  2212 03/16/23 0414   INR 1.2 1.2   APTT 25.3  --         Chemistries:   Recent Labs   Lab 03/16/23 0414  03/17/23  0340 03/18/23  0321 03/19/23  0339   * 138 138 139   K 2.9* 2.9* 3.2* 3.5   CL 97 100 101 103   CO2 23 29 28 29   BUN 15 13 11 7*   CREATININE 1.1 0.8 0.8 0.8   CALCIUM 7.8* 8.4* 8.2* 8.4*   PROT 5.8* 5.8* 5.6*  --    BILITOT 1.1* 1.1* 0.7  --    ALKPHOS 102 89 84  --    ALT 12 10 15  --    AST 12 11 19  --    MG 1.9 1.9 1.8 1.6   PHOS 2.7 2.8  --   --         Cardiac Enzymes: Ejection Fractions:    No results for input(s): CPK, CPKMB, MB, TROPONINI in the last 72 hours. EF   Date Value Ref Range Status   09/30/2022 60 % Final        POCT Glucose: HbA1c:    Recent Labs   Lab 03/16/23  0306   POCTGLUCOSE 200*    Hemoglobin A1C   Date Value Ref Range Status   02/23/2022 5.0 4.0 - 5.6 % Final     Comment:     ADA Screening Guidelines:  5.7-6.4%  Consistent with prediabetes  >or=6.5%  Consistent with diabetes    High levels of fetal hemoglobin interfere with the HbA1C  assay. Heterozygous hemoglobin variants (HbS, HgC, etc)do  not significantly interfere with this assay.   However, presence of multiple variants may affect accuracy.             ICU LOS 3d 2h  Level of Care: OK to Transfer    Chart Check: 24 HR Done  Shift Summary/Plan for the shift: see care plan summary

## 2023-03-19 NOTE — PT/OT/SLP PROGRESS
Occupational Therapy      Patient Name:  Kashif Iverson   MRN:  [id number]    Patient not seen OOB today secondary to Patient fatigue. Patient attempting to rouse from deep sleep. Patient requesting later follow up. OT will follow-up as agreed 03/20/23.    3/19/2023

## 2023-03-19 NOTE — CARE UPDATE
[facility] [place] 4 [place]      3/19/2023       Skin assessed on: Saturday      [x] No Pressure Injuries Present    [x]Prevention Measures Documented    [] Yes LDA  for Pressure Injury Previously documented     [] Yes New Pressure Injury Discovered   [] LDA for New Pressure Injury Added      Attending RN:  Teresa Warren RN     Second RN:  Jamir Manley RN

## 2023-03-19 NOTE — ASSESSMENT & PLAN NOTE
Influenza A + suspected bacterial pneumonia as well. Unable to collect sputum culture. Blood cultures with Acinetobacter  - continue antibiotics and tamiflu  - ID consulted for Acinetobacter bacteremia

## 2023-03-19 NOTE — ASSESSMENT & PLAN NOTE
No new neurologic deficit on exam but somewhat confused at times. CTH no acute changes  - continue asa, eliquis, statin

## 2023-03-19 NOTE — PLAN OF CARE
Pt remains in ICU alert and disoriented to place and situation. HR running NSR on monitor. BP stable. PT on vHFNC sating above 88%. Normothermic. PO medication given, swallowed without difficulty. IV antibiotics given. Pt had complaints of nasal congestion, eICU MD notified with new orders given. Voids via urinal. Updated on plan of care. No new falls, injuries, or skin breakdown this shift.

## 2023-03-19 NOTE — ASSESSMENT & PLAN NOTE
Blood cultures from admit with Acinetobacter bacteremia  - he does have a port in place previously used for chemotherapy, now no longer a chemotherapy candidate- this may need to be removed   - repeat cultures cleared  - ID consulted

## 2023-03-19 NOTE — PROVIDER TRANSFER
Mr Kashif Iverson is a 62 y.o. man with lung cancer metastatic to bone who is no longer a chemotherapy candidate, COPD on 4L home O2 who was admitted with acute hypoxic respiratory failure secondary to influenza A and suspected bacterial pneumonia. Improved and weaned to 5L NC. Blood cultures from admit with Acinetobacter which has now cleared. ID consulted. He does have port in place which is no longer in use and may need to be removed. His respiratory failure may need to improve more before he can tolerate sedation for port removal.     To do  - wean O2  - PT, OT consulted  - follow up ID recs for Acinetobacter bacteremia- anticipate need for port removal  - Palliative also following     Jennifer Singleton MD  03/19/2023  10:16 AM

## 2023-03-19 NOTE — CARE UPDATE
[facility], [facility] [place]  Nurses Note -- 4 Eyes      3/19/2023       Skin assessed on: Q Shift      [x] No Pressure Injuries Present    [x]Prevention Measures Documented    [] Yes LDA  for Pressure Injury Previously documented     [] Yes New Pressure Injury Discovered   [] LDA for New Pressure Injury Added      Attending RN:  Teresa Warren RN     Second RN:  Jamir Manley RN

## 2023-03-19 NOTE — ASSESSMENT & PLAN NOTE
He seems somewhat confused. Cannot remember the status of his lung cancer. Oriented to self and location but can't remember why he is in the hospital. He does have history of strokes. Other care providers state he has been confused like this on past admissions. This may be due to sepsis. CTH no acute changes.   - this is improved

## 2023-03-19 NOTE — SUBJECTIVE & OBJECTIVE
Interval History: Feeling well this morning.     Review of Systems   Constitutional:  Negative for chills and fever.   Respiratory:  Negative for cough and shortness of breath.    Cardiovascular:  Negative for chest pain, palpitations and leg swelling.   Gastrointestinal:  Negative for abdominal pain, constipation, diarrhea, nausea and vomiting.   Genitourinary:  Negative for difficulty urinating.   Neurological:  Negative for weakness and numbness.   Psychiatric/Behavioral:  Negative for confusion.    Objective:     Vital Signs (Most Recent):  Temp: 97.8 °F (36.6 °C) (03/19/23 0701)  Pulse: (!) 111 (03/19/23 0900)  Resp: (!) 25 (03/19/23 0900)  BP: (!) 148/85 (03/19/23 0900)  SpO2: (!) 93 % (03/19/23 0900) Vital Signs (24h Range):  Temp:  [97.8 °F (36.6 °C)-98.4 °F (36.9 °C)] 97.8 °F (36.6 °C)  Pulse:  [] 111  Resp:  [17-29] 25  SpO2:  [87 %-99 %] 93 %  BP: ()/(52-88) 148/85     Weight: 77.9 kg (171 lb 11.8 oz)  Body mass index is 24.64 kg/m².    Intake/Output Summary (Last 24 hours) at 3/19/2023 1004  Last data filed at 3/19/2023 0900  Gross per 24 hour   Intake 774.47 ml   Output 1325 ml   Net -550.53 ml        Physical Exam  Vitals and nursing note reviewed.   Constitutional:       General: He is not in acute distress.     Appearance: He is ill-appearing (chronically). He is not toxic-appearing.   HENT:      Head: Normocephalic and atraumatic.      Nose: Nose normal.      Mouth/Throat:      Mouth: Mucous membranes are moist.   Cardiovascular:      Rate and Rhythm: Normal rate and regular rhythm.   Pulmonary:      Effort: Pulmonary effort is normal. No respiratory distress.      Breath sounds: No wheezing or rhonchi.      Comments: 5L NC  Abdominal:      General: Bowel sounds are normal. There is no distension.      Palpations: Abdomen is soft. There is no mass.      Tenderness: There is no abdominal tenderness. There is no guarding.   Musculoskeletal:         General: Swelling (L arm) present.       Right lower leg: No edema.      Left lower leg: No edema.   Skin:     General: Skin is warm and dry.      Findings: Bruising present.   Neurological:      Mental Status: He is alert.      Comments: Oriented to self, location, situation       Significant Labs: All pertinent labs within the past 24 hours have been reviewed.    Significant Imaging: I have reviewed all pertinent imaging results/findings within the past 24 hours.

## 2023-03-19 NOTE — PLAN OF CARE
Problem: Adult Inpatient Plan of Care  Goal: Plan of Care Review  Outcome: Ongoing, Progressing     Problem: Adult Inpatient Plan of Care  Goal: Absence of Hospital-Acquired Illness or Injury  Outcome: Ongoing, Progressing     Problem: Adult Inpatient Plan of Care  Goal: Optimal Comfort and Wellbeing  Outcome: Ongoing, Progressing     Problem: Adult Inpatient Plan of Care  Goal: Readiness for Transition of Care  Outcome: Ongoing, Progressing

## 2023-03-19 NOTE — PROGRESS NOTES
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     Patient presented with severe respiratory distress that started shortly prior to presentation.  Per his wife, he started having worsening shortness of breath despite receiving DuoNebs and she noted that he was choking.  Despite being on 4 oxygen at home, he was hypoxic to 65%.  Additional symptoms include decreased p.o. intake and diarrhea which improved.    In the ED, the patient was tachycardic (up to 160), tachypneic and hypoxic requiring BiPAP.  Labs were remarkable for leukocytosis (32.7), elevated D-dimer (> 33.0), elevated BNP (272), elevated troponin (0.045), elevated lactic acid (6.7) and positive influenza A.  Chest x-ray showed persistent bilateral airspace and pleural opacities, slightly improved in the left upper lung since prior exam.  Left upper extremity ultrasound showed no thrombus in central veins.  CTA chest showed no PE, interval enlargement of known left supraclavicular mass/adenopathy, new masslike consolidation with air bronchograms in the right middle lobe, likely pneumonic process and new right hilar node measuring 26 x 22 mm..  He was given DuoNebs x3, 1.9 L of LR, 500 mL of NS, vancomycin, Zosyn, and was admitted for further management.          Overview/Hospital Course:  Mr Kashif Iverson was admitted with acute on chronic hypoxic respiratory  failure secondary to pneumonia and influenza A. Started antibitoics and tamiflu. Started vapotherm. Pulmonary following. He has had some confusion; CTH no acute changes. Improved respiratory status. Weaned to 5L NC. Blood cultures from admit with Acinetobacter bacteremia which has cleared. ID consulted. Stepped down to floor.       Interval History: Feeling well this morning.     Review of Systems   Constitutional:  Negative for chills and fever.   Respiratory:  Negative for cough and shortness of breath.    Cardiovascular:  Negative for chest pain, palpitations and leg swelling.   Gastrointestinal:  Negative for abdominal pain, constipation, diarrhea, nausea and vomiting.   Genitourinary:  Negative for difficulty urinating.   Neurological:  Negative for weakness and numbness.   Psychiatric/Behavioral:  Negative for confusion.    Objective:     Vital Signs (Most Recent):  Temp: 97.8 °F (36.6 °C) (03/19/23 0701)  Pulse: (!) 111 (03/19/23 0900)  Resp: (!) 25 (03/19/23 0900)  BP: (!) 148/85 (03/19/23 0900)  SpO2: (!) 93 % (03/19/23 0900) Vital Signs (24h Range):  Temp:  [97.8 °F (36.6 °C)-98.4 °F (36.9 °C)] 97.8 °F (36.6 °C)  Pulse:  [] 111  Resp:  [17-29] 25  SpO2:  [87 %-99 %] 93 %  BP: ()/(52-88) 148/85     Weight: 77.9 kg (171 lb 11.8 oz)  Body mass index is 24.64 kg/m².    Intake/Output Summary (Last 24 hours) at 3/19/2023 1004  Last data filed at 3/19/2023 0900  Gross per 24 hour   Intake 774.47 ml   Output 1325 ml   Net -550.53 ml        Physical Exam  Vitals and nursing note reviewed.   Constitutional:       General: He is not in acute distress.     Appearance: He is ill-appearing (chronically). He is not toxic-appearing.   HENT:      Head: Normocephalic and atraumatic.      Nose: Nose normal.      Mouth/Throat:      Mouth: Mucous membranes are moist.   Cardiovascular:      Rate and Rhythm: Normal rate and regular rhythm.   Pulmonary:      Effort: Pulmonary effort is normal. No respiratory distress.       Breath sounds: No wheezing or rhonchi.      Comments: 5L NC  Abdominal:      General: Bowel sounds are normal. There is no distension.      Palpations: Abdomen is soft. There is no mass.      Tenderness: There is no abdominal tenderness. There is no guarding.   Musculoskeletal:         General: Swelling (L arm) present.      Right lower leg: No edema.      Left lower leg: No edema.   Skin:     General: Skin is warm and dry.      Findings: Bruising present.   Neurological:      Mental Status: He is alert.      Comments: Oriented to self, location, situation       Significant Labs: All pertinent labs within the past 24 hours have been reviewed.    Significant Imaging: I have reviewed all pertinent imaging results/findings within the past 24 hours.      Assessment/Plan:      * Acute on chronic respiratory failure with hypoxia  Patient admitted with Hypoxic which is Acute on Chronic.  he is on home oxygen at 4 LPM. Signs/symptoms of respiratory failure include- tachypnea, increased work of breathing, respiratory distress, use of accessory muscles and wheezing present on admission.   - Labs and images were reviewed. Patient Has not has a recent ABG.   - Supplemental oxygen was provided and noted- 5L NC  - Respiratory failure is due to- Pneumonia   - treatment: see influenza and sepsis  - he is improving       Pneumonia  Influenza A + suspected bacterial pneumonia as well. Unable to collect sputum culture. Blood cultures with Acinetobacter  - continue antibiotics and tamiflu  - ID consulted for Acinetobacter bacteremia    Acinetobacter lwoffi infection  Blood cultures from admit with Acinetobacter bacteremia  - he does have a port in place previously used for chemotherapy, now no longer a chemotherapy candidate- this may need to be removed   - repeat cultures cleared  - ID consulted     Prolonged Q-T interval on ECG  Noted- monitor       Influenza A  Contributes to his respiratory failure   - continue Tamiflu x5  days      Advanced care planning/counseling discussion  Advance Care Planning     Date: 03/16/2023    Code Status  Dr Marroquin discussed the patient's code status with the patient and his wife.  He wants to remain full code at this time.  Dr Marroquin spent a total of 2 minutes engaging the patient in this advance care planning discussion.  - Palliative care consulted.            Persistent atrial fibrillation  [id number] Score: 3   Continue home metoprolol and Eliquis  Telemetry monitoring    COPD (chronic obstructive pulmonary disease)  No signs of current exacerbation  - continue nebs          Severe sepsis  This patient does have evidence of infective focus  My overall impression is severe sepsis.  Source: Respiratory  Antibiotics given-   Antibiotics (72h ago, onward)      Start     Stop Route Frequency Ordered    03/16/23 1130  vancomycin (VANCOCIN) 1,000 mg in dextrose 5 % (D5W) 250 mL IVPB (Vial-Mate)         -- IV Every 12 hours (non-standard times) 03/16/23 0301    03/16/23 0700  piperacillin-tazobactam (ZOSYN) 4.5 g in dextrose 5 % in water (D5W) 5 % 100 mL IVPB (MB+)  (ED Adult Sepsis Treatment)         -- IV Every 8 hours (non-standard times) 03/16/23 0222    03/16/23 0322  vancomycin - pharmacy to dose  (vancomycin IVPB)        See Hyperspace for full Linked Orders Report.    -- IV pharmacy to manage frequency 03/16/23 0222          Latest lactate reviewed-  No results for input(s): LACTATE in the last 72 hours.  Organ dysfunction indicated by Acute respiratory failure and Encephalopathy    Fluid challenge Actual Body weight- Patient will receive 30ml/kg actual body weight to calculate fluid bolus for treatment of septic shock.     Post- resuscitation assessment Yes Perfusion exam was performed within 6 hours of septic shock presentation after bolus shows Adequate tissue perfusion assessed by non-invasive monitoring       Will Not start Pressors- Levophed for MAP of 65  Source control achieved by:  antibiotics   - he is improving   - blood cultures with Acinetobacter-- ID consulted    Coronary artery disease involving native coronary artery of native heart without angina pectoris  No active chest pain or signs of ACS.   - continue asa, statin    PAD (peripheral artery disease)  No signs of lower extremity ulceration/wound  Continue asa, statin       Acute encephalopathy  He seems somewhat confused. Cannot remember the status of his lung cancer. Oriented to self and location but can't remember why he is in the hospital. He does have history of strokes. Other care providers state he has been confused like this on past admissions. This may be due to sepsis. CTH no acute changes.   - this is improved        Chronic diastolic heart failure  Results for orders placed during the hospital encounter of 09/28/22    Echo    Interpretation Summary  · The left ventricle is normal in size with concentric hypertrophy and normal systolic function.  · The estimated ejection fraction is 60%.  · Normal left ventricular diastolic function.  · Mild right ventricular enlargement with mildly reduced right ventricular systolic function.  · Mild left atrial enlargement.  · Mild right atrial enlargement.  · Normal central venous pressure (3 mmHg).  · The estimated PA systolic pressure is 40 mmHg.  · Trivial posterior pericardial effusion.    No signs of acute exacerbation  BNP  Recent Labs   Lab 03/15/23  2212   *     BNP was mildly elevated on admit. Currently continues on PO lasix   Note: previously did have mixed systolic/diastolic CHF with EF down to 35% (2/2022). EF has recovered.   - continues on BB  - not on ACEi/ARB/entersto/spironolactone as outpatient. Will not start now       Hypokalemia  Replace and monitor  Lab Results   Component Value Date    K 3.5 03/19/2023           Essential hypertension  BP currently well controlled  Continue home metoprolol and amlodipine   Monitor BP        Lung cancer, main bronchus,  left  He follows with Oncology. He was previously receiving palliative chemotherapy. Per most recently Oncology notes, he is no longer a candidate for chemotherapy.       History of completed stroke  No new neurologic deficit on exam but somewhat confused at times. CTH no acute changes  - continue asa, eliquis, statin         VTE Risk Mitigation (From admission, onward)           Ordered     apixaban tablet 5 mg  2 times daily         03/16/23 0221     IP VTE HIGH RISK PATIENT  Once         03/16/23 0221     Place sequential compression device  Until discontinued         03/16/23 0221                    Discharge Planning   AZ: 3/21/2023     Code Status: Full Code   Is the patient medically ready for discharge?:     Reason for patient still in hospital (select all that apply): Patient trending condition  Discharge Plan A: Home with family        10:13 AM  Called wife Natty Iverson to update her. No answer, left message.     3:48 PM  Updated wife by phone    Critical care time spent on the evaluation and treatment of severe organ dysfunction, review of pertinent labs and imaging studies, discussions with consulting providers and discussions with patient/family: 30 minutes.      Jennifer Singleton MD  Department of Hospital Medicine   [facility] [place]

## 2023-03-19 NOTE — ASSESSMENT & PLAN NOTE
Patient admitted with Hypoxic which is Acute on Chronic.  he is on home oxygen at 4 LPM. Signs/symptoms of respiratory failure include- tachypnea, increased work of breathing, respiratory distress, use of accessory muscles and wheezing present on admission.   - Labs and images were reviewed. Patient Has not has a recent ABG.   - Supplemental oxygen was provided and noted- 5L NC  - Respiratory failure is due to- Pneumonia   - treatment: see influenza and sepsis  - he is improving

## 2023-03-20 NOTE — SUBJECTIVE & OBJECTIVE
Interval History: patient sitting up in bed. He is now on 5 liters BNC. He states he feels like his normal self    Medications:  Continuous Infusions:  Scheduled Meds:   albuterol-ipratropium  3 mL Nebulization Q4H    amLODIPine  10 mg Oral Daily    apixaban  5 mg Oral BID    aspirin  81 mg Oral Daily    atorvastatin  80 mg Oral Daily    ceFEPime (MAXIPIME) IVPB  2 g Intravenous Q8H    cilostazoL  100 mg Oral BID    famotidine (PF)  20 mg Intravenous Daily    furosemide  40 mg Oral BID    metoprolol tartrate  50 mg Oral TID     PRN Meds:acetaminophen, albuterol-ipratropium, dextromethorphan-guaiFENesin  mg/5 ml, melatonin, ondansetron, oxyCODONE, prochlorperazine, sodium chloride 0.9%    Objective:     Vital Signs (Most Recent):  Temp: 98.2 °F (36.8 °C) (03/20/23 1112)  Pulse: 80 (03/20/23 1112)  Resp: 18 (03/20/23 1112)  BP: 110/71 (03/20/23 1112)  SpO2: (!) 94 % (03/20/23 1112)   Vital Signs (24h Range):  Temp:  [98.2 °F (36.8 °C)-98.6 °F (37 °C)] 98.2 °F (36.8 °C)  Pulse:  [46-99] 80  Resp:  [17-29] 18  SpO2:  [88 %-98 %] 94 %  BP: (101-126)/(57-84) 110/71     Weight: 80 kg (176 lb 5.9 oz)  Body mass index is 25.31 kg/m².    Physical Exam  Vitals and nursing note reviewed.   Constitutional:       General: He is not in acute distress.     Appearance: He is ill-appearing. He is not toxic-appearing or diaphoretic.      Interventions: Nasal cannula in place.      Comments: Appears older than states age   HENT:      Head: Normocephalic and atraumatic.      Right Ear: External ear normal.      Left Ear: External ear normal.      Mouth/Throat:      Mouth: Mucous membranes are dry.   Eyes:      General:         Right eye: No discharge.         Left eye: No discharge.   Cardiovascular:      Rate and Rhythm: Normal rate and regular rhythm.   Pulmonary:      Effort: Pulmonary effort is normal.      Comments: On oxygen @ 5 Liters BNC  Abdominal:      General: Abdomen is flat.   Musculoskeletal:      Right lower leg: No  edema.      Left lower leg: No edema.   Skin:     General: Skin is warm and dry.   Neurological:      Mental Status: He is alert and oriented to person, place, and time.   Psychiatric:         Attention and Perception: He is inattentive.         Mood and Affect: Affect is flat and angry.         Behavior: Behavior is withdrawn.           Advance Care Planning   Advance Directives:   Goals of Care: What is most important right now is to focus on spending time at home, avoiding the hospital, remaining as independent as possible, symptom/pain control, quality of life, even if it means sacrificing a little time. Accordingly, we have decided that the best plan to meet the patient's goals includes enrolling in hospice care.       Significant Labs: All pertinent labs within the past 24 hours have been reviewed.  CBC:   Recent Labs   Lab 03/20/23  0544   WBC 9.12   HGB 12.8*   HCT 39.4*   MCV 85        BMP:  Recent Labs   Lab 03/20/23 0544         K 3.7      CO2 28   BUN 6*   CREATININE 0.8   CALCIUM 8.5*   MG 1.5*     LFT:  Lab Results   Component Value Date    AST 19 03/18/2023    ALKPHOS 84 03/18/2023    BILITOT 0.7 03/18/2023     Albumin:   Albumin   Date Value Ref Range Status   03/18/2023 2.4 (L) 3.5 - 5.2 g/dL Final     Protein:   Total Protein   Date Value Ref Range Status   03/18/2023 5.6 (L) 6.0 - 8.4 g/dL Final     Lactic acid:   Lab Results   Component Value Date    LACTATE 3.7 (HH) 03/16/2023    LACTATE 6.7 (HH) 03/15/2023       Significant Imaging: None since 3/17

## 2023-03-20 NOTE — PT/OT/SLP EVAL
Occupational Therapy   Evaluation    Name: Kashif Iverson  MRN: [id number]  Admitting Diagnosis: Acute on chronic respiratory failure with hypoxia  Recent Surgery: * No surgery found *      Recommendations:     Discharge Recommendations: home health OT (w/ supervision/family assistance)  Discharge Equipment Recommendations:  none  Barriers to discharge:   (Pt desats to 80-85% w/ minimal exertion on 5L O2 NC (high flow).)    Assessment:     Kashif Iverson is a 62 y.o. male with a medical diagnosis of Acute on chronic respiratory failure with hypoxia. Performance deficits affecting function: weakness, impaired endurance, impaired cognition, decreased lower extremity function, decreased safety awareness, decreased coordination, impaired functional mobility, gait instability, impaired balance, impaired cardiopulmonary response to activity.      Pt oriented to self and confused this date but was able to be redirected for participating in seated ADLs and minimal OOB activity 2* desaturation as low as 80-85% on 5L O2 NC. Pt found w/ NC out of nares and SPO2 of 72%, requiring mod cueing and coaching for using PLB technique; pt recovered to ~90% and later required 5L for standing from EOB x 2 trials to laterally step to HOB w/ CGA-HHA and no AD. Pt w/ decreased endurance but tolerated tx session fairly; pt will continue to benefit form skilled acute OT services to maximize functional capacity for safe performance w/ ADLs and functional mobility.     Rehab Prognosis: Good; patient would benefit from acute skilled OT services to address these deficits and reach maximum level of function.       Plan:     Patient to be seen 3 x/week, 5 x/week to address the above listed problems via self-care/home management, therapeutic activities, therapeutic exercises  Plan of Care Expires: 04/03/23  Plan of Care Reviewed with: patient    Subjective     Chief Complaint: None stated  Patient/Family Comments/goals: Agreeable to session    Occupational  Profile: Pt w/ confusion and oriented to self only; information obtained from chart when pt was evaluated by OT on 11/4/22.   Living Environment: Pt lives w/ spouse in [facility] w/ 0 CHENG.   Previous level of function: Required some assistance from wife for ADLs; ambulatory w/ mod (I).   Roles and Routines: Unknown   Equipment Used at Home:  (Per chart, oxygen, RW, shower cahir, wheelchair)  Assistance upon Discharge: Wife; pt reports he has 2 children.     Pain/Comfort:  Pain Rating 1: 0/10  Pain Rating Post-Intervention 1: 0/10    Patients cultural, spiritual, Adventist conflicts given the current situation: no    Objective:     Communicated with: Nurse prior to session.  Patient found HOB elevated with telemetry, peripheral IV upon OT entry to room.    General Precautions: Standard, fall, respiratory  Orthopedic Precautions: N/A  Braces: N/A  Respiratory Status: Pt found w/ NC out of nares w/ SPO2 72%; pt placed back on 4L HF NC and was able to demo PLB for increasing SPO2 to ~90%; pt sat EOB and desat to ~85%, requiring 5L HF NC prior to standing. Pt able to maintain SPO2 of 90% on 5L but upon returning to supine, he was noted w/ delayed desaturation of ~80-85%. Pt left on 5L HF NC w/ nurse aware/present.       Occupational Performance:    Bed Mobility:  Pt required increased time for initiating bed mobility.   Patient completed Scooting hips to EOB with stand by assistance and contact guard assistance  Patient completed Supine to Sit with stand by assistance and contact guard assistance and HOB elevated   Patient completed Sit to Supine with stand by assistance and contact guard assistance    Functional Mobility/Transfers:  Patient completed Sit <> Stand Transfer x 2 trials from EOB with contact guard assistance  with  hand-held assist   Per trial, pt impulsively sat EOB after statically standing ~1 min; pt unable to verbalize his reasoning but denied feeling SOB or fatigued   Functional Mobility: Pt was able to  laterally step to HOB w/ CGA-HHA and no AD.     Activities of Daily Living:  Feeding:  set-up assist for eating breakfast that was untouched     Grooming: stand by assistance for washing face using warm towel     Cognitive/Visual Perceptual:  Cognitive/Psychosocial Skills:     -       Oriented to: Person   -       Follows Commands/attention:Easily distracted and Follows one-step commands  -       Communication: clear/fluent but random   -       Memory: Impaired STM, Impaired LTM, and Poor immediate recall  -       Safety awareness/insight to disability: impaired     Physical Exam:  Balance:    -       good sitting balance; fair + standing   Postural examination/scapula alignment:    -       Rounded shoulders  -       Forward head  Skin integrity: Visible skin intact  Edema:  None noted  Sensation:    -       Intact  Upper Extremity Range of Motion:     -       Right Upper Extremity: WFL  -       Left Upper Extremity: WFL  Upper Extremity Strength:    -       Right Upper Extremity: WFL  -       Left Upper Extremity: WFL   Strength:    -       Right Upper Extremity: WFL  -       Left Upper Extremity: WFL    AMPAC 6 Click ADL:  AMPAC Total Score: 20    Treatment & Education:  -Initial eval complete.   -Pt educated on role of OT and POC; no family present.   -Pt educated on importance of keeping NC in nares; will benefit from reinforcement.   -Questions and concerns addressed within scope.     Patient left HOB elevated with all lines intact, call button in reach, and bed alarm on    GOALS:   Multidisciplinary Problems       Occupational Therapy Goals          Problem: Occupational Therapy    Goal Priority Disciplines Outcome Interventions   Occupational Therapy Goal     OT, PT/OT Ongoing, Progressing    Description: Goals to be met by: 4/3/23     Patient will increase functional independence with ADLs by performing:    Grooming while seated with Modified [place].  Toileting from bedside commode with Minimal  Assistance for hygiene and clothing management.   Step transfer with Modified [place]  Toilet transfer to bedside commode with Modified [place].  Upper extremity exercise program x15 reps per handout, with independence.  Implementation of PLB and energy conservation strategies into daily activities w/ min cueing.                          History:     Past Medical History:   Diagnosis Date    Chronic obstructive pulmonary disease with acute exacerbation 9/5/2022    Combined systolic and diastolic heart failure     Dependence on supplemental oxygen 5/24/2022    History of completed stroke 9/3/2019     09/03/2019 On CT exam    History of non-ST elevation myocardial infarction (NSTEMI) 2/22/2022    Hypertension     Lung cancer     NSCLC    Lung mass     left lung    Macrocytic anemia 8/24/2019    PAD (peripheral artery disease) 3/14/2022    LUIS FELIPE 3/11/22    Peripheral artery disease          Past Surgical History:   Procedure Laterality Date    BRONCHOSCOPY N/A 08/30/2019    Procedure: Bronchoscopy;  Surgeon: Miguel Diagnostic Provider;  [place] [facility] [place] [address] [place] [address];  Service: Anesthesiology;  Laterality: N/A;    CORONARY ANGIOGRAPHY N/A 03/04/2022    Procedure: ANGIOGRAM, CORONARY ARTERY;  Surgeon: Robson Green MD;  Location: [facility] CATH LAB;  Service: Cardiology;  Laterality: N/A;    INSERTION OF TUNNELED CENTRAL VENOUS CATHETER (CVC) WITH SUBCUTANEOUS PORT         Time Tracking:     OT Date of Treatment: 03/20/23  OT Start Time: 0912  OT Stop Time: 0935  OT Total Time (min): 23 min    Billable Minutes:Evaluation 15  Self Care/Home Management 8  Total Time 23 (co-eval w/ PT)     3/20/2023

## 2023-03-20 NOTE — SUBJECTIVE & OBJECTIVE
Current Facility-Administered Medications on File Prior to Encounter   Medication    heparin, porcine (PF) 100 unit/mL injection flush 500 Units    sodium chloride 0.9% flush 10 mL     Current Outpatient Medications on File Prior to Encounter   Medication Sig    albuterol (VENTOLIN HFA) 90 mcg/actuation inhaler Inhale 2 puffs into the lungs every 6 (six) hours as needed for Wheezing. Rescue    albuterol-ipratropium (DUO-NEB) 2.5 mg-0.5 mg/3 mL nebulizer solution Inhale contents of 1 vial (3 mLs) by nebulization every 4 (four) hours as needed for Wheezing or Shortness of Breath. Rescue    amLODIPine (NORVASC) 10 MG tablet Take 1 tablet (10 mg total) by mouth once daily.    apixaban (ELIQUIS) 5 mg Tab Take 1 tablet (5 mg total) by mouth 2 (two) times daily.    ascorbic acid-multivit-min (VITAMIN C ENERGY BOOSTER) 1,000 mg PwEP Take 3,000 mg by mouth once daily. Patient takes 3,000 mg per day    aspirin 81 MG Chew Take 1 tablet (81 mg total) by mouth once daily.    atorvastatin (LIPITOR) 80 MG tablet Take 1 tablet (80 mg total) by mouth once daily.    cilostazoL (PLETAL) 100 MG Tab Take 1 tablet (100 mg total) by mouth 2 (two) times daily.    furosemide (LASIX) 40 MG tablet Take 1 tablet (40 mg total) by mouth 2 (two) times a day.    metoprolol tartrate (LOPRESSOR) 50 MG tablet Take 1 tablet (50 mg total) by mouth 3 (three) times daily.    oxyCODONE (ROXICODONE) 15 MG Tab TAKE 1 PO AT NIGHT PRN PAIN AND 1/3 PILL DAILY PRN PAIN    polyethylene glycol (GLYCOLAX) 17 gram/dose powder Mix 1 capful (17 grams total) with a liquid and take by mouth once daily.    tiotropium (SPIRIVA) 18 mcg inhalation capsule Inhale 1 capsule (18 mcg total) into the lungs via handihaler once daily. Controller       Review of patient's allergies indicates:  No Known Allergies    Past Medical History:   Diagnosis Date    Chronic obstructive pulmonary disease with acute exacerbation 9/5/2022    Combined systolic and diastolic heart failure      Dependence on supplemental oxygen 5/24/2022    History of completed stroke 9/3/2019     09/03/2019 On CT exam    History of non-ST elevation myocardial infarction (NSTEMI) 2/22/2022    Hypertension     Lung cancer     NSCLC    Lung mass     left lung    Macrocytic anemia 8/24/2019    PAD (peripheral artery disease) 3/14/2022    LUIS FELIPE 3/11/22    Peripheral artery disease      Past Surgical History:   Procedure Laterality Date    BRONCHOSCOPY N/A 08/30/2019    Procedure: Bronchoscopy;  Surgeon: [facility] Diagnostic Provider;  Location: [facility] OR [address] [place] [address];  Service: Anesthesiology;  Laterality: N/A;    CORONARY ANGIOGRAPHY N/A 03/04/2022    Procedure: ANGIOGRAM, CORONARY ARTERY;  Surgeon: Robson Green MD;  Location: [facility] CATH LAB;  Service: Cardiology;  Laterality: N/A;    INSERTION OF TUNNELED CENTRAL VENOUS CATHETER (CVC) WITH SUBCUTANEOUS PORT       Family History       Problem Relation (Age of Onset)    Cancer Father, Sister    Diabetes Mother    Heart defect Mother    No Known Problems Son          Tobacco Use    Smoking status: Former     Packs/day: 1.00     Years: 25.00     Pack years: 25.00     Types: Cigarettes     Quit date: 2020     Years since quitting: 3.2    Smokeless tobacco: Never   Substance and Sexual Activity    Alcohol use: Yes     Comment: 11/3/22: Occ.    Drug use: Never    Sexual activity: Yes     Partners: Female     Review of Systems   All other systems reviewed and are negative.  Objective:     Vital Signs (Most Recent):  Temp: 98.6 °F (37 °C) (03/20/23 0732)  Pulse: 78 (03/20/23 0752)  Resp: 18 (03/20/23 0752)  BP: (!) 104/58 (03/20/23 0732)  SpO2: 98 % (03/20/23 0752)   Vital Signs (24h Range):  Temp:  [98.2 °F (36.8 °C)-98.7 °F (37.1 °C)] 98.6 °F (37 °C)  Pulse:  [] 78  Resp:  [17-55] 18  SpO2:  [88 %-98 %] 98 %  BP: (101-126)/(57-84) 104/58     Weight: 80 kg (176 lb 5.9 oz)  Body mass index is 25.31 kg/m².    Physical Exam  Vitals reviewed.   Constitutional:       General: He is not in  acute distress.     Appearance: He is not ill-appearing.   HENT:      Head: Normocephalic and atraumatic.   Cardiovascular:      Rate and Rhythm: Normal rate and regular rhythm.      Comments: R IJV port, no erythema or purulence at port pocket  Pulmonary:      Comments: Nasal cannula  Abdominal:      General: There is no distension.   Musculoskeletal:         General: No deformity.   Skin:     General: Skin is warm and dry.   Neurological:      Mental Status: He is alert.       Significant Labs:  I have reviewed all pertinent lab results within the past 24 hours.  CBC:   Recent Labs   Lab 03/20/23  0544   WBC 9.12   RBC 4.62   HGB 12.8*   HCT 39.4*      MCV 85   MCH 27.7   MCHC 32.5     CMP:   Recent Labs   Lab 03/18/23  0321 03/19/23  0339 03/20/23  0544   *   < > 102   CALCIUM 8.2*   < > 8.5*   ALBUMIN 2.4*  --   --    PROT 5.6*  --   --       < > 139   K 3.2*   < > 3.7   CO2 28   < > 28      < > 100   BUN 11   < > 6*   CREATININE 0.8   < > 0.8   ALKPHOS 84  --   --    ALT 15  --   --    AST 19  --   --    BILITOT 0.7  --   --     < > = values in this interval not displayed.       Significant Diagnostics:  I have reviewed all pertinent imaging results/findings within the past 24 hours.

## 2023-03-20 NOTE — ASSESSMENT & PLAN NOTE
Advance Care Planning  Date: 03/16/2023 Code Status Dr Marroquin discussed the patient's code status with the patient and his wife.  He wants to remain full code at this time.  Dr Marroquin spent a total of 2 minutes engaging the patient in this advance care planning discussion.  -I attempted to engage patient in goals of care discussion today but he quickly stated he was not interested in discussing this  -Palliative care consulted and input appreciated.  Fortunately their skilled discussions have been more productive.  Patient's wife is considering hospice care at discharge.

## 2023-03-20 NOTE — SUBJECTIVE & OBJECTIVE
Past Medical History:   Diagnosis Date    Chronic obstructive pulmonary disease with acute exacerbation 9/5/2022    Combined systolic and diastolic heart failure     Dependence on supplemental oxygen 5/24/2022    History of completed stroke 9/3/2019     09/03/2019 On CT exam    History of non-ST elevation myocardial infarction (NSTEMI) 2/22/2022    Hypertension     Lung cancer     NSCLC    Lung mass     left lung    Macrocytic anemia 8/24/2019    PAD (peripheral artery disease) 3/14/2022    LUIS FELIPE 3/11/22    Peripheral artery disease        Past Surgical History:   Procedure Laterality Date    BRONCHOSCOPY N/A 08/30/2019    Procedure: Bronchoscopy;  Surgeon: [facility] Diagnostic Provider;  Location: [facility] OR [address] [place] [address];  Service: Anesthesiology;  Laterality: N/A;    CORONARY ANGIOGRAPHY N/A 03/04/2022    Procedure: ANGIOGRAM, CORONARY ARTERY;  Surgeon: Robson Green MD;  Location: [facility] CATH LAB;  Service: Cardiology;  Laterality: N/A;    INSERTION OF TUNNELED CENTRAL VENOUS CATHETER (CVC) WITH SUBCUTANEOUS PORT         Review of patient's allergies indicates:  No Known Allergies    Current Facility-Administered Medications on File Prior to Encounter   Medication    heparin, porcine (PF) 100 unit/mL injection flush 500 Units    sodium chloride 0.9% flush 10 mL     Current Outpatient Medications on File Prior to Encounter   Medication Sig    albuterol (VENTOLIN HFA) 90 mcg/actuation inhaler Inhale 2 puffs into the lungs every 6 (six) hours as needed for Wheezing. Rescue    albuterol-ipratropium (DUO-NEB) 2.5 mg-0.5 mg/3 mL nebulizer solution Inhale contents of 1 vial (3 mLs) by nebulization every 4 (four) hours as needed for Wheezing or Shortness of Breath. Rescue    amLODIPine (NORVASC) 10 MG tablet Take 1 tablet (10 mg total) by mouth once daily.    apixaban (ELIQUIS) 5 mg Tab Take 1 tablet (5 mg total) by mouth 2 (two) times daily.    ascorbic acid-multivit-min (VITAMIN C ENERGY BOOSTER) 1,000 mg PwEP Take 3,000 mg by mouth  once daily. Patient takes 3,000 mg per day    aspirin 81 MG Chew Take 1 tablet (81 mg total) by mouth once daily.    atorvastatin (LIPITOR) 80 MG tablet Take 1 tablet (80 mg total) by mouth once daily.    cilostazoL (PLETAL) 100 MG Tab Take 1 tablet (100 mg total) by mouth 2 (two) times daily.    furosemide (LASIX) 40 MG tablet Take 1 tablet (40 mg total) by mouth 2 (two) times a day.    metoprolol tartrate (LOPRESSOR) 50 MG tablet Take 1 tablet (50 mg total) by mouth 3 (three) times daily.    oxyCODONE (ROXICODONE) 15 MG Tab TAKE 1 PO AT NIGHT PRN PAIN AND 1/3 PILL DAILY PRN PAIN    polyethylene glycol (GLYCOLAX) 17 gram/dose powder Mix 1 capful (17 grams total) with a liquid and take by mouth once daily.    tiotropium (SPIRIVA) 18 mcg inhalation capsule Inhale 1 capsule (18 mcg total) into the lungs via handihaler once daily. Controller     Family History       Problem Relation (Age of Onset)    Cancer Father, Sister    Diabetes Mother    Heart defect Mother    No Known Problems Son          Tobacco Use    Smoking status: Former     Packs/day: 1.00     Years: 25.00     Pack years: 25.00     Types: Cigarettes     Quit date: 2020     Years since quitting: 3.2    Smokeless tobacco: Never   Substance and Sexual Activity    Alcohol use: Yes     Comment: 11/3/22: Occ.    Drug use: Never    Sexual activity: Yes     Partners: Female     Review of Systems   Constitutional:  Positive for appetite change.   HENT: Negative.     Eyes: Negative.    Respiratory:  Positive for shortness of breath.    Cardiovascular: Negative.    Gastrointestinal:  Positive for diarrhea.   Endocrine: Negative.    Genitourinary: Negative.    Musculoskeletal: Negative.    Skin: Negative.    Allergic/Immunologic: Negative.    Neurological: Negative.    Psychiatric/Behavioral: Negative.     Objective:     Vital Signs (Most Recent):  Temp: 98.2 °F (36.8 °C) (03/20/23 1112)  Pulse: 93 (03/20/23 1302)  Resp: 18 (03/20/23 1302)  BP: 110/71 (03/20/23  1112)  SpO2: (!) 92 % (03/20/23 1302)   Vital Signs (24h Range):  Temp:  [98.2 °F (36.8 °C)-98.6 °F (37 °C)] 98.2 °F (36.8 °C)  Pulse:  [46-99] 93  Resp:  [17-29] 18  SpO2:  [88 %-98 %] 92 %  BP: (101-126)/(57-84) 110/71     Weight: 80 kg (176 lb 5.9 oz)  Body mass index is 25.31 kg/m².    Physical Exam  Vitals and nursing note reviewed.   Constitutional:       General: He is in acute distress.      Appearance: He is ill-appearing.   HENT:      Head: Normocephalic and atraumatic.      Nose: Nose normal.      Mouth/Throat:      Mouth: Mucous membranes are moist.   Eyes:      Extraocular Movements: Extraocular movements intact.   Cardiovascular:      Rate and Rhythm: Tachycardia present.      Pulses: Normal pulses.      Heart sounds: No murmur heard.  Pulmonary:      Effort: Pulmonary effort is normal.      Breath sounds: Normal breath sounds.   Abdominal:      General: Abdomen is flat.      Palpations: Abdomen is soft.      Tenderness: There is no abdominal tenderness.   Musculoskeletal:      Right lower leg: No edema.      Left lower leg: No edema.      Comments: Left upper extremity swelling   Skin:     General: Skin is warm.      Capillary Refill: Capillary refill takes less than 2 seconds.      Comments: Palpable port in chest. No surrounding erythema or tenderness   Neurological:      General: No focal deficit present.      Mental Status: He is alert.   Psychiatric:         Mood and Affect: Mood normal.           Significant Labs: All pertinent labs within the past 24 hours have been reviewed.    Significant Imaging: I have reviewed all pertinent imaging results/findings within the past 24 hours.

## 2023-03-20 NOTE — ASSESSMENT & PLAN NOTE
-No signs of current exacerbation  -Pulmonology recommends starting triple therapy inhaler (Breztri or Trelegy) at discharge after he recovers from his pneumonia  -Continue nebs

## 2023-03-20 NOTE — PLAN OF CARE
Problem: Physical Therapy  Goal: Physical Therapy Goal  Description: Goals to be met by: 4/3/23     Patient will increase functional independence with mobility by performin. Supine to sit with Modified [place]  2. Rolling to Left and Right with Modified [place]  3. Sit to stand transfer with Modified [place]  4. Bed to chair transfer with Modified [place]   5. Gait x50 feet with Modified [place] using O2  6. Lower extremity exercise program 2 sets x10 reps per handout, with independence    Outcome: Ongoing, Progressing     Pt may benefit from HHPT services.

## 2023-03-20 NOTE — ASSESSMENT & PLAN NOTE
-At times he seems somewhat confused and removes his oxygen and intermittently does not remember the status of his lung cancer.   -Oriented to self and location but can't remember why he is in the hospital. He does have history of strokes.   -Other care providers state he has been confused like this on past admissions.   -Unclear if this is baseline or hospitalization or infection related  -CTH no acute changes.   -Improving  -Ordered delirium precautions

## 2023-03-20 NOTE — PROGRESS NOTES
Pharmacokinetic Assessment Follow Up: IV Vancomycin    Vancomycin serum concentration assessment(s):    The trough level was drawn correctly and can be used to guide therapy at this time. The measurement is within the desired definitive target range of 10 to 20 mcg/mL.    Vancomycin Regimen Plan:    Change regimen to Vancomycin 750 mg IV every 12 hours with next serum trough concentration measured at 2230 prior to 4th dose on 3/21/23    Drug levels (last 3 results):  Recent Labs   Lab Result Units 03/17/23  1050 03/18/23  1031 03/19/23  2236   Vancomycin-Trough ug/mL 15.8 16.8 18.7       Pharmacy will continue to follow and monitor vancomycin.    Please contact pharmacy at extension [phone] for questions regarding this assessment.    Thank you for the consult,   Marlon Cervantes       Patient brief summary:  Kashif Iverson is a 62 y.o. male initiated on antimicrobial therapy with IV Vancomycin for treatment of  pneumonia    The patient's current regimen is Vancomycin 750mg q12h    Drug Allergies:   Review of patient's allergies indicates:  No Known Allergies    Actual Body Weight:   80 kg    Renal Function:   Estimated Creatinine Clearance: 98.9 mL/min (based on SCr of 0.8 mg/dL).,     Dialysis Method (if applicable):  N/A    CBC (last 72 hours):  Recent Labs   Lab Result Units 03/17/23  0340 03/18/23  0321 03/19/23  0339   WBC K/uL 12.30 11.91 10.02   Hemoglobin g/dL 12.2* 11.6* 12.2*   Hematocrit % 37.0* 35.5* 37.2*   Platelets K/uL 228 243 268   Gran % % 76.7* 76.0* 73.6*   Lymph % % 11.5* 12.2* 13.2*   Mono % % 11.1 9.8 10.6   Eosinophil % % 0.2 0.8 1.4   Basophil % % 0.2 0.7 0.8   Differential Method  Automated Automated Automated       Metabolic Panel (last 72 hours):  Recent Labs   Lab Result Units 03/17/23  0340 03/18/23  0321 03/19/23  0339   Sodium mmol/L 138 138 139   Potassium mmol/L 2.9* 3.2* 3.5   Chloride mmol/L 100 101 103   CO2 mmol/L 29 28 29   Glucose mg/dL 110 128* 106   BUN mg/dL 13 11 7*    Creatinine mg/dL 0.8 0.8 0.8   Albumin g/dL 2.6* 2.4*  --    Total Bilirubin mg/dL 1.1* 0.7  --    Alkaline Phosphatase U/L 89 84  --    AST U/L 11 19  --    ALT U/L 10 15  --    Magnesium mg/dL 1.9 1.8 1.6   Phosphorus mg/dL 2.8  --   --        Vancomycin Administrations:  vancomycin given in the last 96 hours                     vancomycin (VANCOCIN) 1,000 mg in dextrose 5 % (D5W) 250 mL IVPB (Vial-Mate) (mg) 1,000 mg New Bag 03/19/23 2328      Restarted  1416      Restarted  1231     1,000 mg New Bag  1209     1,000 mg New Bag 03/18/23 2303      Restarted  1259     1,000 mg New Bag  1151      Restarted 03/17/23 2329     1,000 mg New Bag  2311     1,000 mg New Bag  1123     1,000 mg New Bag 03/16/23 2339     1,000 mg New Bag  1216    vancomycin 1.5 g in dextrose 5 % 250 mL IVPB (ready to mix) ()  Restarted 03/16/23 0047                    Microbiologic Results:  Microbiology Results (last 7 days)       Procedure Component Value Units Date/Time    Blood culture [[id number]] Collected: 03/16/23 1315    Order Status: Completed Specimen: Blood from Antecubital, Left Arm Updated: 03/19/23 1503     Blood Culture, Routine No Growth to date      No Growth to date      No Growth to date      No Growth to date    Blood culture [[id number]] Collected: 03/16/23 1315    Order Status: Completed Specimen: Blood from Antecubital, Left Arm Updated: 03/19/23 1503     Blood Culture, Routine No Growth to date      No Growth to date      No Growth to date      No Growth to date    Blood culture #1 **CANNOT BE ORDERED STAT** [[id number]]  (Abnormal)  (Susceptibility) Collected: 03/15/23 2240    Order Status: Completed Specimen: Blood from Peripheral, Hand, Right Updated: 03/19/23 0644     Blood Culture, Routine Gram stain aer bottle: Gram negative rods      Results called to and read back by: Teresa Warren-ICU 03/16/2023  12:03      [facility]    Blood culture #2 **CANNOT BE ORDERED STAT** [[id number]] Collected:  03/15/23 2304    Order Status: Completed Specimen: Blood from Peripheral, Wrist, Right Updated: 03/19/23 0303     Blood Culture, Routine No Growth to date      No Growth to date      No Growth to date      No Growth to date    Culture, Respiratory with Gram Stain [[id number]]     Order Status: Canceled Specimen: Respiratory

## 2023-03-20 NOTE — HPI
Kashif Iverson is a 62 y.o. 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 He is flu positive. During this admission blood culture was positive for  Acinetobacter. General surgery was consulted for port removal. Patient is unsure when his port was placed but records indicate his chemo was started in 2019. He is unaware of any plans for ongoing chemo in the future.

## 2023-03-20 NOTE — PROGRESS NOTES
Therapy with vancomycin complete and/or consult discontinued by provider.  Pharmacy will sign off, please re-consult as needed. Thank You

## 2023-03-20 NOTE — PLAN OF CARE
Mr has periods of confusion forgets and removes oxygen requires frequent reorientation also instructed to call and not to get out of bed by himself

## 2023-03-20 NOTE — ASSESSMENT & PLAN NOTE
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 Repeat bcx 3/16 NGTD. Day 6 abx, now cefepime. Received tamiflu for influenza.  QTc Int : 531 ms. ID consulted for "acinetobacter bacteremia. note he does have a port in place, previously being used for chemo, now no longer chemo candidate- does this need to be removed?    Most likely source of bacteremia was RML pneumonia- aspiration? Post flu bacterial pneumonia? Post-obstuctive pna 2/2 known lung cancer? Agree that there is concern that port could have been seeded from bacteremia and would consider removal, especially since it doesn't seem to be needed anymore    Recommendations:   - continue cefepime  - agree with port removal  - aspiration precautions  - midline. Anticipate 14 days iv abx. Can not use quinolones with prolonged qtc  - consult RT for Aerobika and  on use.   - consider hypertonic saline nebulization for airway/mucous clearance.    Outpatient Antibiotic Therapy Plan:    Please send referral to [facility].    1) Infection: bacteremia    2) Discharge Antibiotics:    Intravenous antibiotics:   Cefepime 2gm iv q8h (or 6gm daily continuous infusion)    3) Therapy Duration:  14 days    Estimated end date of IV antibiotics: 3/28    4) Outpatient Weekly Labs:    Order the following labs to be drawn on Mondays:    CBC   CMP    CRP    5) Fax Lab Results to Infectious Diseases Provider: Dr Celestin    [facility] Fax Number: [phone]    6) Outpatient Infectious Diseases Follow-up     Follow-up appointment will be arranged by the ID clinic and will be found in the patient's appointments tab.     Prior to discharge, please ensure the patient's follow-up has been scheduled.     If there is still no follow-up scheduled prior to " discharge, please send an EPIC message to Kristy Baldwin in Infectious Diseases.

## 2023-03-20 NOTE — CONSULTS
"[facility] - Telemetry  Infectious Disease  Consult Note    Patient Name: Kashif Iverson  MRN: [id number]  Admission Date: 3/15/2023  Hospital Length of Stay: 4 days  Attending Physician: Raphael Hathaway MD  Primary Care Provider: Eduardo Ruiz MD     Isolation Status: Droplet    Patient information was obtained from patient and ER records.      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 Repeat bcx 3/16 NGTD. Day 6 abx, now cefepime. Received tamiflu for influenza.  QTc Int : 531 ms. ID consulted for "acinetobacter bacteremia. note he does have a port in place, previously being used for chemo, now no longer chemo candidate- does this need to be removed?    Most likely source of bacteremia was RML pneumonia- aspiration? Post flu bacterial pneumonia? Post-obstuctive pna 2/2 known lung cancer? Agree that there is concern that port could have been seeded from bacteremia and would consider removal, especially since it doesn't seem to be needed anymore    Recommendations:   - continue cefepime  - agree with port removal  - aspiration precautions  - midline. Anticipate 14 days iv abx. Can not use quinolones with prolonged qtc  - consult RT for Aerobika and  on use.   - consider hypertonic saline nebulization for airway/mucous clearance.    Outpatient Antibiotic Therapy Plan:    Please send referral to [facility].    1) Infection: bacteremia    2) Discharge Antibiotics:    Intravenous antibiotics:  Cefepime 2gm iv q8h (or 6gm daily continuous infusion)    3) Therapy Duration:  14 days    Estimated end date of IV " antibiotics: 3/28    4) Outpatient Weekly Labs:    Order the following labs to be drawn on Mondays:   CBC  CMP   CRP    5) Fax Lab Results to Infectious Diseases Provider: Dr Kelly    [facility] Fax Number: [phone]    6) Outpatient Infectious Diseases Follow-up    Follow-up appointment will be arranged by the ID clinic and will be found in the patient's appointments tab.    Prior to discharge, please ensure the patient's follow-up has been scheduled.    If there is still no follow-up scheduled prior to discharge, please send an EPIC message to Kristy Baldwin in Infectious Diseases.                  Thank you for your consult. I will follow-up with patient. Please contact us if you have any additional questions.    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 Initially in ICU, now floor status. Pt unclear event leading up until admission, but wife (and cousin) are at bedside and repeat she was short of breath all of a sudden and they checked oxygen level and it was in 60%. Currently getting breathing treatment. Reports some productive phlegm, but otherwise denies complaints. Denies pain or soreness at port.      Bcx in one of two sets from admit with acinetobacter  Repeat bcx 3/16 NGTD    Blood Culture, Routine ACINETOBACTER LWOFFII GROUP Abnormal     Resulting Agency [facility]        Susceptibility     Acinetobacter lwoffii group     CULTURE, BLOOD     Amikacin <=16 mcg/mL Sensitive     Amp/Sulbactam <=8/4 mcg/mL Sensitive     Cefepime 4 mcg/mL Sensitive     Ceftriaxone 8 mcg/mL Sensitive     Ciprofloxacin <=1 mcg/mL  "Sensitive     Gentamicin <=4 mcg/mL Sensitive     Levofloxacin <=2 mcg/mL Sensitive     Meropenem <=1 mcg/mL Sensitive     Minocycline <=4 mcg/mL Sensitive     Tetracycline <=4 mcg/mL Sensitive     Tobramycin <=4 mcg/mL Sensitive     Trimeth/Sulfa <=2/38 mcg/mL Sensitive               Received 5 days vanc/zosyn, now cefepime    Received tamiflu     Ct chest  New masslike consolidation with air bronchograms in the right middle lobe, likely pneumonic process.  Underlying neoplastic process not excluded.       QTc Int : 531 ms     ID consulted for "acinetobacter bacteremia. note he does have a port in place, previously being used for chemo, now no longer chemo candidate- does this need to be removed?      Past Medical History:   Diagnosis Date    Chronic obstructive pulmonary disease with acute exacerbation 9/5/2022    Combined systolic and diastolic heart failure     Dependence on supplemental oxygen 5/24/2022    History of completed stroke 9/3/2019     09/03/2019 On CT exam    History of non-ST elevation myocardial infarction (NSTEMI) 2/22/2022    Hypertension     Lung cancer     NSCLC    Lung mass     left lung    Macrocytic anemia 8/24/2019    PAD (peripheral artery disease) 3/14/2022    LUIS FELIPE 3/11/22    Peripheral artery disease        Past Surgical History:   Procedure Laterality Date    BRONCHOSCOPY N/A 08/30/2019    Procedure: Bronchoscopy;  Surgeon: [facility] Diagnostic Provider;  Location: [facility] OR [address] [place] [address];  Service: Anesthesiology;  Laterality: N/A;    CORONARY ANGIOGRAPHY N/A 03/04/2022    Procedure: ANGIOGRAM, CORONARY ARTERY;  Surgeon: Robson Green MD;  Location: [facility] CATH LAB;  Service: Cardiology;  Laterality: N/A;    INSERTION OF TUNNELED CENTRAL VENOUS CATHETER (CVC) WITH SUBCUTANEOUS PORT         Review of patient's allergies indicates:  No Known Allergies    Current Facility-Administered Medications on File Prior to Encounter   Medication    heparin, porcine (PF) 100 unit/mL injection flush 500 Units    " sodium chloride 0.9% flush 10 mL     Current Outpatient Medications on File Prior to Encounter   Medication Sig    albuterol (VENTOLIN HFA) 90 mcg/actuation inhaler Inhale 2 puffs into the lungs every 6 (six) hours as needed for Wheezing. Rescue    albuterol-ipratropium (DUO-NEB) 2.5 mg-0.5 mg/3 mL nebulizer solution Inhale contents of 1 vial (3 mLs) by nebulization every 4 (four) hours as needed for Wheezing or Shortness of Breath. Rescue    amLODIPine (NORVASC) 10 MG tablet Take 1 tablet (10 mg total) by mouth once daily.    apixaban (ELIQUIS) 5 mg Tab Take 1 tablet (5 mg total) by mouth 2 (two) times daily.    ascorbic acid-multivit-min (VITAMIN C ENERGY BOOSTER) 1,000 mg PwEP Take 3,000 mg by mouth once daily. Patient takes 3,000 mg per day    aspirin 81 MG Chew Take 1 tablet (81 mg total) by mouth once daily.    atorvastatin (LIPITOR) 80 MG tablet Take 1 tablet (80 mg total) by mouth once daily.    cilostazoL (PLETAL) 100 MG Tab Take 1 tablet (100 mg total) by mouth 2 (two) times daily.    furosemide (LASIX) 40 MG tablet Take 1 tablet (40 mg total) by mouth 2 (two) times a day.    metoprolol tartrate (LOPRESSOR) 50 MG tablet Take 1 tablet (50 mg total) by mouth 3 (three) times daily.    oxyCODONE (ROXICODONE) 15 MG Tab TAKE 1 PO AT NIGHT PRN PAIN AND 1/3 PILL DAILY PRN PAIN    polyethylene glycol (GLYCOLAX) 17 gram/dose powder Mix 1 capful (17 grams total) with a liquid and take by mouth once daily.    tiotropium (SPIRIVA) 18 mcg inhalation capsule Inhale 1 capsule (18 mcg total) into the lungs via handihaler once daily. Controller     Family History       Problem Relation (Age of Onset)    Cancer Father, Sister    Diabetes Mother    Heart defect Mother    No Known Problems Son          Tobacco Use    Smoking status: Former     Packs/day: 1.00     Years: 25.00     Pack years: 25.00     Types: Cigarettes     Quit date: 2020     Years since quitting: 3.2    Smokeless tobacco: Never   Substance and Sexual Activity     Alcohol use: Yes     Comment: 11/3/22: Occ.    Drug use: Never    Sexual activity: Yes     Partners: Female     Review of Systems   Constitutional:  Positive for appetite change.   HENT: Negative.     Eyes: Negative.    Respiratory:  Positive for shortness of breath.    Cardiovascular: Negative.    Gastrointestinal:  Positive for diarrhea.   Endocrine: Negative.    Genitourinary: Negative.    Musculoskeletal: Negative.    Skin: Negative.    Allergic/Immunologic: Negative.    Neurological: Negative.    Psychiatric/Behavioral: Negative.     Objective:     Vital Signs (Most Recent):  Temp: 98.2 °F (36.8 °C) (03/20/23 1112)  Pulse: 93 (03/20/23 1302)  Resp: 18 (03/20/23 1302)  BP: 110/71 (03/20/23 1112)  SpO2: (!) 92 % (03/20/23 1302)   Vital Signs (24h Range):  Temp:  [98.2 °F (36.8 °C)-98.6 °F (37 °C)] 98.2 °F (36.8 °C)  Pulse:  [46-99] 93  Resp:  [17-29] 18  SpO2:  [88 %-98 %] 92 %  BP: (101-126)/(57-84) 110/71     Weight: 80 kg (176 lb 5.9 oz)  Body mass index is 25.31 kg/m².    Physical Exam  Vitals and nursing note reviewed.   Constitutional:       General: He is in acute distress.      Appearance: He is ill-appearing.   HENT:      Head: Normocephalic and atraumatic.      Nose: Nose normal.      Mouth/Throat:      Mouth: Mucous membranes are moist.   Eyes:      Extraocular Movements: Extraocular movements intact.   Cardiovascular:      Rate and Rhythm: Tachycardia present.      Pulses: Normal pulses.      Heart sounds: No murmur heard.  Pulmonary:      Effort: Pulmonary effort is normal.      Breath sounds: Normal breath sounds.   Abdominal:      General: Abdomen is flat.      Palpations: Abdomen is soft.      Tenderness: There is no abdominal tenderness.   Musculoskeletal:      Right lower leg: No edema.      Left lower leg: No edema.      Comments: Left upper extremity swelling   Skin:     General: Skin is warm.      Capillary Refill: Capillary refill takes less than 2 seconds.      Comments: Palpable port in  chest. No surrounding erythema or tenderness   Neurological:      General: No focal deficit present.      Mental Status: He is alert.   Psychiatric:         Mood and Affect: Mood normal.           Significant Labs: All pertinent labs within the past 24 hours have been reviewed.    Significant Imaging: I have reviewed all pertinent imaging results/findings within the past 24 hours.

## 2023-03-20 NOTE — NURSING TRANSFER
Nursing Transfer Note      3/19/2023     Reason patient is being transferred: stable for floor    Transfer To: 314 from     Transfer via bed    Transfer with 5L  to O2, cardiac monitoring    Transported by RN and transport    Medicines sent: N/A    Any special needs or follow-up needed: N/A    Chart send with patient: Yes    Notified: spouse    Patient reassessed at: 2210 on 3/19/23     Upon arrival to floor: cardiac monitor applied, patient oriented to room, call bell in reach, and bed in lowest position    Pt alert with intermittent confusion to time and situation. HR running NSR on monitor. BP stable. Pt on 5L sating above 88%. Normothermic. PO medication given, swallowed without difficulty. Voids via urinal. Pt updated on plan of care. No new falls, injuries, or skin breakdown at this time.

## 2023-03-20 NOTE — ASSESSMENT & PLAN NOTE
-This is chronic diastolic chf  -Note: previously did have mixed systolic/diastolic CHF with EF down to 35% (2/2022). EF has recovered.   -Echo 9/22 showed EF 60%, normal diastolic function and pasp 40mmHg  - on admit  -Strict ins/outs and daily weights  -Continue PO lasix and metoprolol.    -BP is soft and not much room to start ACEi/ARB or entresto at this time

## 2023-03-20 NOTE — ASSESSMENT & PLAN NOTE
-Admitted to inpatient status  -Patient admitted with Hypoxic which is Acute on Chronic.   -He is on home oxygen at 4 LPM.   -Signs/symptoms of respiratory failure included- tachypnea, increased work of breathing, respiratory distress, use of accessory muscles and wheezing present on admission.   -Labs and images were reviewed. Patient Has not has a recent ABG.   -Supplemental oxygen was provided and noted  -Respiratory failure is due to- Pneumonia, influenza, COPD and lung cancer  -Treated in ICU with vapotherm, antiviral, antibiotics and nebulizers.  Weaned to nasal canula and stepped down to the floor on 3/19  -Completes tamiflu today  -Continue antibiotics for bacteremia and possible bacterial pneumonia as below  -Currently on 5L NC O2 - wean as able.    -Add incentive spirometry, tessalon and guaifenesin

## 2023-03-20 NOTE — PROGRESS NOTES
"[facility]  Palliative Medicine  Progress Note    Patient Name: Kashif Iverson  MRN: [id number]  Admission Date: 3/15/2023  Hospital Length of Stay: 4 days  Code Status: Full Code   Attending Provider: Raphael Hathaway MD  Consulting Provider: Eli Arango NP  Primary Care Physician: Eduardo Ruiz MD  Principal Problem:Acute on chronic respiratory failure with hypoxia         Assessment/Plan:   Palliative encounter:  Advance Care Planning     F/u today  -Interval chart reviewed; patient transferred out of ICU yesterday  -At time of visit patient sitting up with oxygen on. He looks to be close to baseline today and states he feels like his "normal self" Spouse at bedside.  -we discussed his current clinical condition in detail once again. COPD, Lung cancer, heart failure. Each disease trajectory and outcome and how each is a terminal illness but all three together have contributed to his frequent hospitalizations, and contribute to his limited life expectancy. Patient gave no eye contact during discussion and spouse states "he looks better but he's not joking around like he usually does when he is feeling better." I asked patient what is he thinking about and he said "all of this". I asked him to be more specific and he tells me he did not realize he was this sick and should he just give up now. I did let him know that he has had many discussions with all providers about his condition and why did he think he was in the hospital all the time? He alludes to the fact that when he leaves the hospital each time he is feeling better so he doesn't think he is sick any longer...   -He states he is not ready to die and does not want to die. spouse states we have been fighting this and we will continue to keep a positive attitude. I allowed them to talk openly about fear of dying and offered emotional support.   -Lunch came in and we discussed his appetite. His spouse talked about how good his appetite is at home and " "how he wants an omelet every morning, and the patient states "that's you who fixes that for me. I never said I wanted that." I could tell he was likely lashing out to her because he is grieving after our discussion of dying and terminal illness.  -spouse wanted to go outside the room and talk. She tells me she has been under a lot of stress lately and that she had shingles recently with some residual on her face. -She has spoken to heart of hospice again since the initial conversation last hospitalization and is to speak with them again this week with tentative plans for admisson. -She had many misconceptions about morphine and hospice hastening death after talking to her sister in law so I cleared this up. She felt better and states she called hospice last Friday after her sister in law upset her and they had told her the same things I had.   -addressed all questions and concerns  -updated Dr Hathaway    Palliative encounter:  Advance Care Planning   -Discussed in ICU IDT rounds this am  -Palliative consult received; patient well known to me.   -chart reviewed in detail  -Last admit to hospital earlier in month the oncologist and myself discussed hospice as only option at this time and that his lung cancer has continued to progress despite maintenance chemo and there are no other tx options.  -We also discussed advanced COPD and that he may likely die from COPD before the cancer but either way he his life expectancy was limited.    -The patient and his spouse seemed shocked that the patient was so ill. They did agree to speak with hospice at that time and the hospice nurse reported the patient and spouse again seemed shock the patient was ill and said  no one ever told them he was this sick. At discharge 3/6/23 they wanted to continue with home Palliative and planned to go visit family the patient had not seen in a long time before agreeing to hospice. This never happened before patient was admitted to hospital " "again.   -at this time the patient in ICU with respiratory failure. He is on vapotherm 100 % and is very weak and sleepy today. Plans to try and wean oxygen today.  -I did touch base with he patient's PCP Dr Ruiz today to let him know the patient was in hospital again and that hospice was recommended in past. He too had spoken to the patient and family last week regarding EOL care. He is going to touch base with the patient;s spouse again to help with the encouragement of hospice at discharge.  - I also touched base with patient's oncologist Dr Higgins and he said he was still recommending hospice and would be glad to f/u with them outpatient to discuss again.   - I will try to reach spouse; she works during the day  -updated Dr Edwards     Acute hypoxemic respiratory failure  Patient with Hypoxic Respiratory failure which is Acute on chronic.  he is on home oxygen at 4 LPM. Supplemental oxygen was provided and noted- Oxygen Concentration (%):  [] 100.   Signs/symptoms of respiratory failure include- increased work of breathing and respiratory distress. Contributing diagnoses includes - Pneumonia and influenza Labs and images were reviewed. Patient Has recent ABG, which has been reviewed. Will treat underlying causes and adjust management of respiratory failure as follows- See plan for HAP/Influenza   -improved and now on 5 liters oxygen C  Transferred to floor     Influenza A  -Mgmt per primary     Hospital-acquired pneumonia  "CTA chest showed no PE, interval enlargement of known left supraclavicular mass/adenopathy, new masslike consolidation with air bronchograms in the right middle lobe, likely pneumonic process and new right hilar node measuring 26 x 22 mm."  -mgmt per primary      COPD (chronic obstructive pulmonary disease)  -advanced COPD  -multiple hospitalizations  -meets hospice criteria     Lung cancer, main bronchus, left  -no longer on maintenance chemo  -oncology recommended hospice      "   Persistent atrial fibrillation  -[id number] Score: 3   -mgmt per rprimary     Coronary artery disease involving native coronary artery of native heart without angina pectoris  noted      PAD (peripheral artery disease)  noted         Chronic diastolic heart failure  noted        Essential hypertension  noted         History of completed stroke  noted         Subjective:     Chief Complaint:   Chief Complaint   Patient presents with    Shortness of Breath     Patient presents to the ED with reports of having shortness of breath. Patient on 5 liters on nasal cannula from home.        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      Patient presented with severe respiratory distress that started shortly prior to presentation.  Per his wife, he started having worsening shortness of breath despite receiving DuoNebs and she noted that he was choking.  Despite being on 4 oxygen at home, he was hypoxic to 65%.  Additional symptoms include decreased p.o. intake and diarrhea which improved.     In the ED, the patient was tachycardic (up to 160), tachypneic and hypoxic requiring BiPAP.  Labs were remarkable for leukocytosis (32.7), elevated D-dimer (> 33.0), elevated BNP (272), elevated troponin (0.045), elevated lactic acid (6.7) and positive influenza A.  Chest x-ray showed persistent bilateral airspace and pleural opacities, slightly improved in the left upper lung since prior exam.  Left upper extremity ultrasound showed no thrombus in central veins.  CTA chest showed no PE, interval enlargement of known left supraclavicular mass/adenopathy, new masslike consolidation with air bronchograms in the right middle lobe, likely pneumonic process and new right hilar  node measuring 26 x 22 mm. He was given DuoNebs x3, 1.9 L of LR, 500 mL of NS, vancomycin, Zosyn, and was admitted for further management.         Hospital Course:  No notes on file    Interval History: patient sitting up in bed. He is now on 5 liters BNC. He states he feels like his normal self    Medications:  Continuous Infusions:  Scheduled Meds:   albuterol-ipratropium  3 mL Nebulization Q4H    amLODIPine  10 mg Oral Daily    apixaban  5 mg Oral BID    aspirin  81 mg Oral Daily    atorvastatin  80 mg Oral Daily    ceFEPime (MAXIPIME) IVPB  2 g Intravenous Q8H    cilostazoL  100 mg Oral BID    famotidine (PF)  20 mg Intravenous Daily    furosemide  40 mg Oral BID    metoprolol tartrate  50 mg Oral TID     PRN Meds:acetaminophen, albuterol-ipratropium, dextromethorphan-guaiFENesin  mg/5 ml, melatonin, ondansetron, oxyCODONE, prochlorperazine, sodium chloride 0.9%    Objective:     Vital Signs (Most Recent):  Temp: 98.2 °F (36.8 °C) (03/20/23 1112)  Pulse: 80 (03/20/23 1112)  Resp: 18 (03/20/23 1112)  BP: 110/71 (03/20/23 1112)  SpO2: (!) 94 % (03/20/23 1112)   Vital Signs (24h Range):  Temp:  [98.2 °F (36.8 °C)-98.6 °F (37 °C)] 98.2 °F (36.8 °C)  Pulse:  [46-99] 80  Resp:  [17-29] 18  SpO2:  [88 %-98 %] 94 %  BP: (101-126)/(57-84) 110/71     Weight: 80 kg (176 lb 5.9 oz)  Body mass index is 25.31 kg/m².    Physical Exam  Vitals and nursing note reviewed.   Constitutional:       General: He is not in acute distress.     Appearance: He is ill-appearing. He is not toxic-appearing or diaphoretic.      Interventions: Nasal cannula in place.      Comments: Appears older than states age   HENT:      Head: Normocephalic and atraumatic.      Right Ear: External ear normal.      Left Ear: External ear normal.      Mouth/Throat:      Mouth: Mucous membranes are dry.   Eyes:      General:         Right eye: No discharge.         Left eye: No discharge.   Cardiovascular:      Rate and Rhythm: Normal rate and regular  rhythm.   Pulmonary:      Effort: Pulmonary effort is normal.      Comments: On oxygen @ 5 Liters [facility]  Abdominal:      General: Abdomen is flat.   Musculoskeletal:      Right lower leg: No edema.      Left lower leg: No edema.   Skin:     General: Skin is warm and dry.   Neurological:      Mental Status: He is alert and oriented to person, place, and time.   Psychiatric:         Attention and Perception: He is inattentive.         Mood and Affect: Affect is flat and angry.         Behavior: Behavior is withdrawn.           Advance Care Planning   Advance Directives:   Goals of Care: What is most important right now is to focus on spending time at home, avoiding the hospital, remaining as independent as possible, symptom/pain control, quality of life, even if it means sacrificing a little time. Accordingly, we have decided that the best plan to meet the patient's goals includes enrolling in hospice care.       Significant Labs: All pertinent labs within the past 24 hours have been reviewed.  CBC:   Recent Labs   Lab 03/20/23  0544   WBC 9.12   HGB 12.8*   HCT 39.4*   MCV 85        BMP:  Recent Labs   Lab 03/20/23  0544         K 3.7      CO2 28   BUN 6*   CREATININE 0.8   CALCIUM 8.5*   MG 1.5*     LFT:  Lab Results   Component Value Date    AST 19 03/18/2023    ALKPHOS 84 03/18/2023    BILITOT 0.7 03/18/2023     Albumin:   Albumin   Date Value Ref Range Status   03/18/2023 2.4 (L) 3.5 - 5.2 g/dL Final     Protein:   Total Protein   Date Value Ref Range Status   03/18/2023 5.6 (L) 6.0 - 8.4 g/dL Final     Lactic acid:   Lab Results   Component Value Date    LACTATE 3.7 (HH) 03/16/2023    LACTATE 6.7 (HH) 03/15/2023       Significant Imaging: None since 3/17         35 min ACP time spent in goals of care, emotional support, formulating and communicating prognosis, exploring burden/benefit of various approaches of treatment.    >50% of 40 mins spent in chart review, face to face discussion,  symptom assessment, coordination of care with other specialists and d/c planning.    Total time 75    Eli Arango NP  Palliative Medicine  [facility] [place] [facility]

## 2023-03-20 NOTE — ASSESSMENT & PLAN NOTE
-He follows with Oncology.   -He was previously receiving palliative chemotherapy.   -Per most recently Oncology notes, he is no longer a candidate for chemotherapy and hospice was recommended  -Consult general surgery for evaluation of port removal  -Palliative care on board and are actively discussing possible hospice care at discharge.

## 2023-03-20 NOTE — PROGRESS NOTES
Vancomycin consult follow-up:    Patient reviewed, renal function stable, no new levels, continue current therapy; Next levels due: trough due 3/21/2023 at 2230

## 2023-03-20 NOTE — PLAN OF CARE
[facility] [place] - Telemetry  Discharge Reassessment    Primary Care Provider: Eduardo Ruiz MD    Expected Discharge Date: 3/21/2023    Reassessment (most recent)       Discharge Reassessment - 03/20/23 1228          Discharge Reassessment    Assessment Type Discharge Planning Reassessment     Discharge Plan discussed with: Patient;Spouse/sig other     Name(s) and Number(s) Natty Iverson (Spouse)   [phone]     Communicated [place] with patient/caregiver Date not available/Unable to determine     Discharge Plan A Home with family;Home Health     Discharge Plan B Home with family;Home Health     DME Needed Upon Discharge  other (see comments)   TBD    Discharge Barriers Identified None     Why the patient remains in the hospital Requires continued medical care        Post-Acute Status    Coverage Medicare A/B     Discharge Delays None known at this time                   SW met with patient spouse and patient at bedside. Patient spouse stated she will be the person helping patient at home. SW discuss PT/OT recommendation for HH services and provided patient spouse with Medicare HH list. Patient spouse stated she thinks patient had [facility] in the past and would like for SW to find out if patient could use HH agency again. SW voiced understanding. RODOLFO also provided patient spouse with his contact #.     Pending ID and general surgery consult.     SW followed up with reviewing patient past admit for previous HH agency. Patient was with [facility] and [facility].     RODOLFO met with patient spouse and patient at bedside. RODOLFO informed patient spouse that patient had [facility] in the past and [facility]. Patient spouse stated that she would like to continue both services at discharge.

## 2023-03-20 NOTE — ASSESSMENT & PLAN NOTE
63 yo male with complex medical history who is admitted for acute on chronic respiratory failure, + blood cultures    - Will reach out to his oncologist for confirmation that port is no longer needed  - Please alert general surgery when he is optimized from a respiratory standpoint for procedural sedation  - Will plan for removal in the OR when ready  - Please call with questions or concerns

## 2023-03-20 NOTE — PT/OT/SLP EVAL
Physical Therapy Evaluation    Patient Name:  Kashif Iverson   MRN:  [id number]    Recommendations:     Discharge Recommendations: home health PT (with family assistance)   Discharge Equipment Recommendations: none   Barriers to discharge: None    Assessment:     Kashif Iverson is a 62 y.o. male admitted with a medical diagnosis of Acute on chronic respiratory failure with hypoxia.  He presents with the following impairments/functional limitations: weakness, impaired endurance, impaired functional mobility, gait instability, impaired balance, impaired cognition, decreased safety awareness, impaired cardiopulmonary response to activity.    Rehab Prognosis: Fair; patient would benefit from acute skilled PT services to address these deficits and reach maximum level of function.    Recent Surgery: * No surgery found *      Plan:     During this hospitalization, patient to be seen 3 x/week to address the identified rehab impairments via gait training, therapeutic activities, therapeutic exercises and progress toward the following goals:    Plan of Care Expires:  04/03/23    Subjective     Chief Complaint: N/A  Patient/Family Comments/goals: Pt agreeable to therapy.   Pain/Comfort:  Pain Rating 1: 0/10      Living Environment:  Pt unable to provide much information at this time and no family present.  Per chart, pt lives with spouse (works) and 2 sons in a [facility] with no concerns   Prior to admission, patients level of function was ambulatory without AD and on home O2.  Equipment at home: cane, straight, walker, rolling, wheelchair, oxygen.  Upon discharge, patient will have assistance from family.    Objective:     Communicated with nurse Florez prior to session.  Patient found HOB elevated with peripheral IV, telemetry, bed alarm  upon PT entry to room.    General Precautions: Standard, fall, respiratory, droplet isolation 2* influenza    Orthopedic Precautions:N/A   Braces: N/A  Respiratory Status: Room air; Pt found in room  without O2 in his nose, spO2 on RA 72%.  Pt placed back on 4L, then increased to 5L per nurse Gautam.  spO2 on 5L ~85-90%.      Exams:  Cognitive Exam:  Patient is oriented to Person.  Pt able to follow simple commands.   Gross Motor Coordination:  WFL  Postural Exam:  Patient presented with the following abnormalities:    -       No postural abnormalities identified  Skin Integrity/Edema:      -       Skin integrity: Visible skin intact  -       Edema: None noted BLE  BLE ROM: WFL  BLE Strength: WFL    Functional Mobility: Pt was alert but confused with flat affect.  Activities limited 2* drop in spO2 on 5L.  PT will continue to assess.    Bed Mobility:     Scooting: stand by assistance and contact guard assistance  Supine to Sit: stand by assistance and contact guard assistance  Sit to Supine: stand by assistance and contact guard assistance  Transfers:     Sit to Stand: stand by assistance and contact guard assistance with no AD x3 trials   Gait: Pt ambulated ~4 ft along bedside with CGA-SBA using on AD and on 5L O2 NC.  Gait limited 2* decreased in spO2 on 5L.  Pt with no major gait deviations.    Balance: Pt with fair dynamic standing balance.       AM-PAC 6 CLICK MOBILITY  Total Score:20       Treatment & Education:  Pt educated on acute skilled PT services and goals.  Pt encouraged to keep O2 in his nose.  Pt educated on pursed lip breathing.  Pt verbalized understanding, will need reinforcement.     Patient left with bed in chair position with all lines intact, call button in reach, bed alarm on, and nurse Gautam notified concerning pt's spO2 on 5L.  Tray table close by.     GOALS:   Multidisciplinary Problems       Physical Therapy Goals          Problem: Physical Therapy    Goal Priority Disciplines Outcome Goal Variances Interventions   Physical Therapy Goal     PT, PT/OT Ongoing, Progressing     Description: Goals to be met by: 4/3/23     Patient will increase functional independence with mobility by  performin. Supine to sit with Modified Starr  2. Rolling to Left and Right with Modified Starr  3. Sit to stand transfer with Modified Starr  4. Bed to chair transfer with Modified Starr   5. Gait x50 feet with Modified Starr using O2  6. Lower extremity exercise program 2 sets x10 reps per handout, with independence                         History:     Past Medical History:   Diagnosis Date    Chronic obstructive pulmonary disease with acute exacerbation 2022    Combined systolic and diastolic heart failure     Dependence on supplemental oxygen 2022    History of completed stroke 9/3/2019     2019 On CT exam    History of non-ST elevation myocardial infarction (NSTEMI) 2022    Hypertension     Lung cancer     NSCLC    Lung mass     left lung    Macrocytic anemia 2019    PAD (peripheral artery disease) 3/14/2022    LUIS FELIPE 3/11/22    Peripheral artery disease        Past Surgical History:   Procedure Laterality Date    BRONCHOSCOPY N/A 2019    Procedure: Bronchoscopy;  Surgeon: Miguel Diagnostic Provider;  Location: [facility] OR [address] [place] [address];  Service: Anesthesiology;  Laterality: N/A;    CORONARY ANGIOGRAPHY N/A 2022    Procedure: ANGIOGRAM, CORONARY ARTERY;  Surgeon: Robson Green MD;  Location: [facility] CATH LAB;  Service: Cardiology;  Laterality: N/A;    INSERTION OF TUNNELED CENTRAL VENOUS CATHETER (CVC) WITH SUBCUTANEOUS PORT         Time Tracking:     PT Received On: 23  PT Start Time: 914     PT Stop Time: 933  PT Total Time (min): 19 min     Billable Minutes: Evaluation 19 min co-eval with OT      2023

## 2023-03-20 NOTE — PROGRESS NOTES
[facility] Medicine  Progress Note    Patient Name: Kashif Iverson  MRN: [id number]  Patient Class: IP- Inpatient   Admission Date: 3/15/2023  Length of Stay: 4 days  Attending Physician: Raphael Hathaway MD  Primary Care Provider: Eduardo Ruiz MD        Subjective:     Principal Problem:Acute on chronic respiratory failure with hypoxia        HPI:  This is a 61 year old male with a PMHx of SCC of the lung with metastasis to the bone (on maintenance gemcitabine and radiation, s/p chemoradiation and immunotherapy), COPD/bronchiestasis (on 4L O2), Afib (on Eliquis), HTN, HFpEF (EF: 60%), CVA, CAD, PAD who presented with SOB.     Patient presented with severe respiratory distress that started shortly prior to presentation.  Per his wife, he started having worsening shortness of breath despite receiving DuoNebs and she noted that he was choking.  Despite being on 4 oxygen at home, he was hypoxic to 65%.  Additional symptoms include decreased p.o. intake and diarrhea which improved.    In the ED, the patient was tachycardic (up to 160), tachypneic and hypoxic requiring BiPAP.  Labs were remarkable for leukocytosis (32.7), elevated D-dimer (> 33.0), elevated BNP (272), elevated troponin (0.045), elevated lactic acid (6.7) and positive influenza A.  Chest x-ray showed persistent bilateral airspace and pleural opacities, slightly improved in the left upper lung since prior exam.  Left upper extremity ultrasound showed no thrombus in central veins.  CTA chest showed no PE, interval enlargement of known left supraclavicular mass/adenopathy, new masslike consolidation with air bronchograms in the right middle lobe, likely pneumonic process and new right hilar node measuring 26 x 22 mm..  He was given DuoNebs x3, 1.9 L of LR, 500 mL of NS, vancomycin, Zosyn, and was admitted for further management.          Overview/Hospital Course:  Mr Kashif Iverson was admitted with acute on chronic hypoxic respiratory failure  secondary to pneumonia and influenza A. Started antibitoics and tamiflu. Started vapotherm. Pulmonary following. He has had some confusion; CTH no acute changes. Improved respiratory status. Weaned to 5L NC. Blood cultures from admit with Acinetobacter bacteremia which has cleared. ID consulted. Stepped down to floor.       Interval History: No acute events overnight.  Breathing comfortably on 5L NC O2.  Note confusion overnight and at times removes his oxygen.  Today was found off oxygen with O2 sat in 70s by nursing.  Denies pain.  Did not wish to engage in goals of care discussion with me.  All questions answered and patient had no further complaints.    Objective:     Vital Signs (Most Recent):  Temp: 98.2 °F (36.8 °C) (03/20/23 1112)  Pulse: 93 (03/20/23 1302)  Resp: 18 (03/20/23 1302)  BP: 110/71 (03/20/23 1112)  SpO2: (!) 92 % (03/20/23 1302) Vital Signs (24h Range):  Temp:  [98.2 °F (36.8 °C)-98.6 °F (37 °C)] 98.2 °F (36.8 °C)  Pulse:  [46-99] 93  Resp:  [17-29] 18  SpO2:  [88 %-98 %] 92 %  BP: (101-126)/(57-84) 110/71     Weight: 80 kg (176 lb 5.9 oz)  Body mass index is 25.31 kg/m².    Intake/Output Summary (Last 24 hours) at 3/20/2023 1356  Last data filed at 3/20/2023 1239  Gross per 24 hour   Intake 450.53 ml   Output 2575 ml   Net -2124.47 ml        Physical Exam  Vitals and nursing note reviewed.   Constitutional:       General: He is not in acute distress.     Appearance: He is ill-appearing (chronically). He is not toxic-appearing.   HENT:      Head: Normocephalic and atraumatic.      Nose: Nose normal.      Mouth/Throat:      Mouth: Mucous membranes are moist.   Cardiovascular:      Rate and Rhythm: Normal rate and regular rhythm.   Pulmonary:      Effort: Pulmonary effort is normal. No respiratory distress.      Breath sounds: No wheezing or rhonchi.      Comments: 5L NC  Abdominal:      General: Bowel sounds are normal. There is no distension.      Palpations: Abdomen is soft. There is no mass.       Tenderness: There is no abdominal tenderness. There is no guarding.   Musculoskeletal:         General: Swelling (L arm) present.      Right lower leg: No edema.      Left lower leg: No edema.   Skin:     General: Skin is warm and dry.      Findings: Bruising present.   Neurological:      Mental Status: He is alert.      Comments: Oriented to self, location, situation       Significant Labs: All pertinent labs within the past 24 hours have been reviewed.    Significant Imaging: I have reviewed all pertinent imaging results/findings within the past 24 hours.      Assessment/Plan:      * Acute on chronic respiratory failure with hypoxia  -Admitted to inpatient status  -Patient admitted with Hypoxic which is Acute on Chronic.   -He is on home oxygen at 4 LPM.   -Signs/symptoms of respiratory failure included- tachypnea, increased work of breathing, respiratory distress, use of accessory muscles and wheezing present on admission.   -Labs and images were reviewed. Patient Has not has a recent ABG.   -Supplemental oxygen was provided and noted  -Respiratory failure is due to- Pneumonia, influenza, COPD and lung cancer  -Treated in ICU with vapotherm, antiviral, antibiotics and nebulizers.  Weaned to nasal canula and stepped down to the floor on 3/19  -Completes tamiflu today  -Continue antibiotics for bacteremia and possible bacterial pneumonia as below  -Currently on 5L NC O2 - wean as able.    -Add incentive spirometry, tessalon and guaifenesin    Severe sepsis  -Patient admitted with severe sepsis  -Sepsis was due to influenza, possible secondary superimposed bacterial pneumonia of RML and Acinetobacter bacteremia.  -Completes day 5 of tamiflu today  -Blood cultures 3/15 grew Acinetobacter sensitive to cefepime.    -Repeat blood cultures 3/16 negative  -Source of bacteremia unclear - ?due to his port?  -Has been treated with vanc and zosyn x 5 days.  Will stop these and start cefepime.  -Consult general surgery  for evaluation of port removal (no longer needs port as not a candidate for further chemo)  -Consult ID for antibiotic recommendations.    Acinetobacter lwoffi Bacteremia  -Treatment as above.    Influenza A  -Completes tamiflu day 5 today.    Pneumonia  -Influenza A + suspected bacterial pneumonia as well.   -Cough has been dry and have been unable to collect sputum culture.   -Blood cultures with Acinetobacter  -Treatment as above for Sepsis    Lung cancer, main bronchus, left  -He follows with Oncology.   -He was previously receiving palliative chemotherapy.   -Per most recently Oncology notes, he is no longer a candidate for chemotherapy and hospice was recommended  -Consult general surgery for evaluation of port removal  -Palliative care on board and are actively discussing possible hospice care at discharge.    Acute encephalopathy  -At times he seems somewhat confused and removes his oxygen and intermittently does not remember the status of his lung cancer.   -Oriented to self and location but can't remember why he is in the hospital. He does have history of strokes.   -Other care providers state he has been confused like this on past admissions.   -Unclear if this is baseline or hospitalization or infection related  -CTH no acute changes.   -Improving  -Ordered delirium precautions    COPD (chronic obstructive pulmonary disease)  -No signs of current exacerbation  -Pulmonology recommends starting triple therapy inhaler (Breztri or Trelegy) at discharge after he recovers from his pneumonia  -Continue nebs    Prolonged Q-T interval on ECG  -Noted- monitor     Advanced care planning/counseling discussion  Advance Care Planning  Date: 03/16/2023 Code Status Dr Marroquin discussed the patient's code status with the patient and his wife.  He wants to remain full code at this time.  Dr Marroquin spent a total of 2 minutes engaging the patient in this advance care planning discussion.  -I attempted to engage patient in  goals of care discussion today but he quickly stated he was not interested in discussing this  -Palliative care consulted and input appreciated.  Fortunately their skilled discussions have been more productive.  Patient's wife is considering hospice care at discharge.    Persistent atrial fibrillation  -[id number] Score: 3   -Continue home metoprolol and Eliquis  -Telemetry monitoring    Coronary artery disease involving native coronary artery of native heart without angina pectoris  -No active chest pain or signs of ACS.   -Continue asa, statin and metoprolol    PAD (peripheral artery disease)  -No signs of lower extremity ulceration/wound  -Continue asa, statin     Chronic diastolic heart failure  -This is chronic diastolic chf  -Note: previously did have mixed systolic/diastolic CHF with EF down to 35% (2/2022). EF has recovered.   -Echo 9/22 showed EF 60%, normal diastolic function and pasp 40mmHg  - on admit  -Strict ins/outs and daily weights  -Continue PO lasix and metoprolol.    -BP is soft and not much room to start ACEi/ARB or entresto at this time    Hypokalemia  -K normal today  -Repeat BMP in AM    Essential hypertension  -BP currently well controlled - SBP in low 100s  -Continue home metoprolol and amlodipine     History of completed stroke  -No new neurologic deficit on exam but somewhat confused at times.   -CTH without any acute changes  -Continue asa, eliquis, statin       VTE Risk Mitigation (From admission, onward)         Ordered     apixaban tablet 5 mg  2 times daily         03/16/23 0221     IP VTE HIGH RISK PATIENT  Once         03/16/23 0221     Place sequential compression device  Until discontinued         03/16/23 0221                Discharge Planning   [place]: 3/21/2023     Code Status: Full Code   Is the patient medically ready for discharge?:     Reason for patient still in hospital (select all that apply): Treatment and Consult recommendations  Discharge Plan A: Home with  family, Home Health   Discharge Delays: None known at this time              Raphael Hathaway MD  Department of Hospital Medicine   [facility] - Telemetry

## 2023-03-20 NOTE — HPI
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 Initially in ICU, now floor status. Pt unclear event leading up until admission, but wife (and cousin) are at bedside and repeat she was short of breath all of a sudden and they checked oxygen level and it was in 60%. Currently getting breathing treatment. Reports some productive phlegm, but otherwise denies complaints. Denies pain or soreness at port.      Bcx in one of two sets from admit with acinetobacter  Repeat bcx 3/16 NGTD    Blood Culture, Routine ACINETOBACTER LWOFFII GROUP Abnormal     Resulting Agency [facility]        Susceptibility     Acinetobacter lwoffii group     CULTURE, BLOOD     Amikacin <=16 mcg/mL Sensitive     Amp/Sulbactam <=8/4 mcg/mL Sensitive     Cefepime 4 mcg/mL Sensitive     Ceftriaxone 8 mcg/mL Sensitive     Ciprofloxacin <=1 mcg/mL Sensitive     Gentamicin <=4 mcg/mL Sensitive     Levofloxacin <=2 mcg/mL Sensitive     Meropenem <=1 mcg/mL Sensitive     Minocycline <=4 mcg/mL Sensitive     Tetracycline <=4 mcg/mL Sensitive     Tobramycin <=4 mcg/mL Sensitive     Trimeth/Sulfa <=2/38 mcg/mL Sensitive               Received 5 days vanc/zosyn, now cefepime    Received tamiflu     Ct chest  New masslike consolidation with air bronchograms in the right middle lobe, likely pneumonic process.  Underlying neoplastic process not excluded.       QTc Int : 531 ms     ID consulted for "acinetobacter bacteremia. note he does have a port in place, previously being used for chemo, now no longer chemo candidate- does this need to be removed?  "

## 2023-03-20 NOTE — ASSESSMENT & PLAN NOTE
-No new neurologic deficit on exam but somewhat confused at times.   -CTH without any acute changes  -Continue asa, eliquis, statin

## 2023-03-20 NOTE — CARE UPDATE
Spoke to patient's home palliative NP this am to let her know patient still in hospital. She was to see him at home today and revisit hospice conversation.

## 2023-03-20 NOTE — PLAN OF CARE
Problem: Occupational Therapy  Goal: Occupational Therapy Goal  Description: Goals to be met by: 4/3/23     Patient will increase functional independence with ADLs by performing:    Grooming while seated with Modified [place].  Toileting from bedside commode with Minimal Assistance for hygiene and clothing management.   Step transfer with Modified [place]  Toilet transfer to bedside commode with Modified [place].  Upper extremity exercise program x15 reps per handout, with independence.  Implementation of PLB and energy conservation strategies into daily activities w/ min cueing.     Outcome: Ongoing, Progressing

## 2023-03-20 NOTE — ASSESSMENT & PLAN NOTE
-Patient admitted with severe sepsis  -Sepsis was due to influenza, possible secondary superimposed bacterial pneumonia of RML and Acinetobacter bacteremia.  -Completes day 5 of tamiflu today  -Blood cultures 3/15 grew Acinetobacter sensitive to cefepime.    -Repeat blood cultures 3/16 negative  -Source of bacteremia unclear - ?due to his port?  -Has been treated with vanc and zosyn x 5 days.  Will stop these and start cefepime.  -Consult general surgery for evaluation of port removal (no longer needs port as not a candidate for further chemo)  -Consult ID for antibiotic recommendations.

## 2023-03-20 NOTE — ASSESSMENT & PLAN NOTE
-Influenza A + suspected bacterial pneumonia as well.   -Cough has been dry and have been unable to collect sputum culture.   -Blood cultures with Acinetobacter  -Treatment as above for Sepsis

## 2023-03-20 NOTE — SUBJECTIVE & OBJECTIVE
Interval History: No acute events overnight.  Breathing comfortably on 5L NC O2.  Note confusion overnight and at times removes his oxygen.  Today was found off oxygen with O2 sat in 70s by nursing.  Denies pain.  Did not wish to engage in goals of care discussion with me.  All questions answered and patient had no further complaints.    Objective:     Vital Signs (Most Recent):  Temp: 98.2 °F (36.8 °C) (03/20/23 1112)  Pulse: 93 (03/20/23 1302)  Resp: 18 (03/20/23 1302)  BP: 110/71 (03/20/23 1112)  SpO2: (!) 92 % (03/20/23 1302) Vital Signs (24h Range):  Temp:  [98.2 °F (36.8 °C)-98.6 °F (37 °C)] 98.2 °F (36.8 °C)  Pulse:  [46-99] 93  Resp:  [17-29] 18  SpO2:  [88 %-98 %] 92 %  BP: (101-126)/(57-84) 110/71     Weight: 80 kg (176 lb 5.9 oz)  Body mass index is 25.31 kg/m².    Intake/Output Summary (Last 24 hours) at 3/20/2023 1356  Last data filed at 3/20/2023 1239  Gross per 24 hour   Intake 450.53 ml   Output 2575 ml   Net -2124.47 ml        Physical Exam  Vitals and nursing note reviewed.   Constitutional:       General: He is not in acute distress.     Appearance: He is ill-appearing (chronically). He is not toxic-appearing.   HENT:      Head: Normocephalic and atraumatic.      Nose: Nose normal.      Mouth/Throat:      Mouth: Mucous membranes are moist.   Cardiovascular:      Rate and Rhythm: Normal rate and regular rhythm.   Pulmonary:      Effort: Pulmonary effort is normal. No respiratory distress.      Breath sounds: No wheezing or rhonchi.      Comments: 5L NC  Abdominal:      General: Bowel sounds are normal. There is no distension.      Palpations: Abdomen is soft. There is no mass.      Tenderness: There is no abdominal tenderness. There is no guarding.   Musculoskeletal:         General: Swelling (L arm) present.      Right lower leg: No edema.      Left lower leg: No edema.   Skin:     General: Skin is warm and dry.      Findings: Bruising present.   Neurological:      Mental Status: He is alert.       Comments: Oriented to self, location, situation       Significant Labs: All pertinent labs within the past 24 hours have been reviewed.    Significant Imaging: I have reviewed all pertinent imaging results/findings within the past 24 hours.

## 2023-03-20 NOTE — CONSULTS
[facility]  [facility]  Consult Note    Patient Name: Kashif Iverson  MRN: [id number]  Code Status: Full Code  Admission Date: 3/15/2023  Hospital Length of Stay: 4 days  Attending Physician: Raphael Hathaway MD  Primary Care Provider: Eduardo Ruiz MD    Patient information was obtained from patient, past medical records and ER records.     Inpatient consult to General Surgery  Consult performed by: Rayshawn Klein MD  Consult ordered by: Raphael Hathaway MD        Subjective:     Principal Problem: Acute on chronic respiratory failure with hypoxia    History of Present Illness: Kashif Iverson is a 62 y.o. 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 He is flu positive. During this admission blood culture was positive for  Acinetobacter. General surgery was consulted for port removal. Patient is unsure when his port was placed but records indicate his chemo was started in 2019. He is unaware of any plans for ongoing chemo in the future.       Current Facility-Administered Medications on File Prior to Encounter   Medication    heparin, porcine (PF) 100 unit/mL injection flush 500 Units    sodium chloride 0.9% flush 10 mL     Current Outpatient Medications on File Prior to Encounter   Medication Sig    albuterol (VENTOLIN HFA) 90 mcg/actuation inhaler Inhale 2 puffs into the lungs every 6 (six) hours as needed for Wheezing. Rescue    albuterol-ipratropium (DUO-NEB) 2.5 mg-0.5 mg/3 mL nebulizer solution Inhale contents of 1 vial (3 mLs) by nebulization every 4 (four) hours as needed for Wheezing or Shortness of Breath. Rescue    amLODIPine (NORVASC) 10 MG tablet Take 1 tablet (10 mg total) by mouth once daily.    apixaban (ELIQUIS) 5 mg Tab Take 1  Patient presents today for routine prenatal care. Pt denies any vaginal leaking bleeding or contractions. + Fetal movement. tablet (5 mg total) by mouth 2 (two) times daily.    ascorbic acid-multivit-min (VITAMIN C ENERGY BOOSTER) 1,000 mg PwEP Take 3,000 mg by mouth once daily. Patient takes 3,000 mg per day    aspirin 81 MG Chew Take 1 tablet (81 mg total) by mouth once daily.    atorvastatin (LIPITOR) 80 MG tablet Take 1 tablet (80 mg total) by mouth once daily.    cilostazoL (PLETAL) 100 MG Tab Take 1 tablet (100 mg total) by mouth 2 (two) times daily.    furosemide (LASIX) 40 MG tablet Take 1 tablet (40 mg total) by mouth 2 (two) times a day.    metoprolol tartrate (LOPRESSOR) 50 MG tablet Take 1 tablet (50 mg total) by mouth 3 (three) times daily.    oxyCODONE (ROXICODONE) 15 MG Tab TAKE 1 PO AT NIGHT PRN PAIN AND 1/3 PILL DAILY PRN PAIN    polyethylene glycol (GLYCOLAX) 17 gram/dose powder Mix 1 capful (17 grams total) with a liquid and take by mouth once daily.    tiotropium (SPIRIVA) 18 mcg inhalation capsule Inhale 1 capsule (18 mcg total) into the lungs via handihaler once daily. Controller       Review of patient's allergies indicates:  No Known Allergies    Past Medical History:   Diagnosis Date    Chronic obstructive pulmonary disease with acute exacerbation 9/5/2022    Combined systolic and diastolic heart failure     Dependence on supplemental oxygen 5/24/2022    History of completed stroke 9/3/2019     09/03/2019 On CT exam    History of non-ST elevation myocardial infarction (NSTEMI) 2/22/2022    Hypertension     Lung cancer     NSCLC    Lung mass     left lung    Macrocytic anemia 8/24/2019    PAD (peripheral artery disease) 3/14/2022    LUIS FELIPE 3/11/22    Peripheral artery disease      Past Surgical History:   Procedure Laterality Date    BRONCHOSCOPY N/A 08/30/2019    Procedure: Bronchoscopy;  Surgeon: Miguel Diagnostic Provider;  Location: [facility] OR [address] [place] [address];  Service: Anesthesiology;  Laterality: N/A;    CORONARY ANGIOGRAPHY N/A 03/04/2022    Procedure: ANGIOGRAM, CORONARY ARTERY;  Surgeon: Robson STARKS  MD Peter;  Location: [facility] CATH LAB;  Service: Cardiology;  Laterality: N/A;    INSERTION OF TUNNELED CENTRAL VENOUS CATHETER (CVC) WITH SUBCUTANEOUS PORT       Family History       Problem Relation (Age of Onset)    Cancer Father, Sister    Diabetes Mother    Heart defect Mother    No Known Problems Son          Tobacco Use    Smoking status: Former     Packs/day: 1.00     Years: 25.00     Pack years: 25.00     Types: Cigarettes     Quit date: 2020     Years since quitting: 3.2    Smokeless tobacco: Never   Substance and Sexual Activity    Alcohol use: Yes     Comment: 11/3/22: Occ.    Drug use: Never    Sexual activity: Yes     Partners: Female     Review of Systems   All other systems reviewed and are negative.  Objective:     Vital Signs (Most Recent):  Temp: 98.6 °F (37 °C) (03/20/23 0732)  Pulse: 78 (03/20/23 0752)  Resp: 18 (03/20/23 0752)  BP: (!) 104/58 (03/20/23 0732)  SpO2: 98 % (03/20/23 0752)   Vital Signs (24h Range):  Temp:  [98.2 °F (36.8 °C)-98.7 °F (37.1 °C)] 98.6 °F (37 °C)  Pulse:  [] 78  Resp:  [17-55] 18  SpO2:  [88 %-98 %] 98 %  BP: (101-126)/(57-84) 104/58     Weight: 80 kg (176 lb 5.9 oz)  Body mass index is 25.31 kg/m².    Physical Exam  Vitals reviewed.   Constitutional:       General: He is not in acute distress.     Appearance: He is not ill-appearing.   HENT:      Head: Normocephalic and atraumatic.   Cardiovascular:      Rate and Rhythm: Normal rate and regular rhythm.      Comments: R IJV port, no erythema or purulence at port pocket  Pulmonary:      Comments: Nasal cannula  Abdominal:      General: There is no distension.   Musculoskeletal:         General: No deformity.   Skin:     General: Skin is warm and dry.   Neurological:      Mental Status: He is alert.       Significant Labs:  I have reviewed all pertinent lab results within the past 24 hours.  CBC:   Recent Labs   Lab 03/20/23  0544   WBC 9.12   RBC 4.62   HGB 12.8*   HCT 39.4*      MCV 85   MCH 27.7    MCHC 32.5     CMP:   Recent Labs   Lab 03/18/23  0321 03/19/23  0339 03/20/23  0544   *   < > 102   CALCIUM 8.2*   < > 8.5*   ALBUMIN 2.4*  --   --    PROT 5.6*  --   --       < > 139   K 3.2*   < > 3.7   CO2 28   < > 28      < > 100   BUN 11   < > 6*   CREATININE 0.8   < > 0.8   ALKPHOS 84  --   --    ALT 15  --   --    AST 19  --   --    BILITOT 0.7  --   --     < > = values in this interval not displayed.       Significant Diagnostics:  I have reviewed all pertinent imaging results/findings within the past 24 hours.      Assessment/Plan:     Acinetobacter lwoffi infection  61 yo male with complex medical history who is admitted for acute on chronic respiratory failure, + blood cultures    - Will reach out to his oncologist for confirmation that port is no longer needed  - Please alert general surgery when he is optimized from a respiratory standpoint for procedural sedation  - Will plan for removal in the OR when ready  - Please call with questions or concerns      VTE Risk Mitigation (From admission, onward)         Ordered     apixaban tablet 5 mg  2 times daily         03/16/23 0221     IP VTE HIGH RISK PATIENT  Once         03/16/23 0221     Place sequential compression device  Until discontinued         03/16/23 0221                    Rayshawn Klein MD  General Surgery  [facility] [place]

## 2023-03-21 NOTE — ANESTHESIA POSTPROCEDURE EVALUATION
Anesthesia Post Evaluation    Patient: Kashif Iverson    Procedure(s) Performed: Procedure(s) (LRB):  REMOVAL, CATHETER, CENTRAL VENOUS, TUNNELED, WITH PORT (Right)    Final Anesthesia Type: MAC      Patient location during evaluation: PACU  Patient participation: Yes- Able to Participate  Level of consciousness: awake and alert and oriented  Post-procedure vital signs: reviewed and stable  Pain management: adequate  Airway patency: patent    PONV status at discharge: No PONV  Anesthetic complications: no      Cardiovascular status: blood pressure returned to baseline, hemodynamically stable and stable  Respiratory status: unassisted, spontaneous ventilation and room air  Hydration status: euvolemic  Follow-up not needed.          Vitals Value Taken Time   /77 03/21/23 1402   Temp 36.9 °C (98.4 °F) 03/21/23 1347   Pulse 82 03/21/23 1414   Resp 15 03/21/23 1414   SpO2 99 % 03/21/23 1414   Vitals shown include unvalidated device data.      No case tracking events are documented in the log.      Pain/Nabila Score: Nabila Score: 8 (3/21/2023  1:44 PM)

## 2023-03-21 NOTE — TELEPHONE ENCOUNTER
3/20/2023    Patient on NP's schedule for PC visit. Pt is currently in the hospital so will reschedule palliative care home visit.    Angelita Smith DNP, FNP-C  [email or web address]  [phone]

## 2023-03-21 NOTE — SUBJECTIVE & OBJECTIVE
Interval History: No acute events. He remains disoriented. WBC within normal limits. He is NPO.     Medications:  Continuous Infusions:  Scheduled Meds:   albuterol-ipratropium  3 mL Nebulization Q4H    amLODIPine  10 mg Oral Daily    apixaban  5 mg Oral BID    aspirin  81 mg Oral Daily    atorvastatin  80 mg Oral Daily    ceFEPime (MAXIPIME) IVPB  2 g Intravenous Q8H    cilostazoL  100 mg Oral BID    famotidine (PF)  20 mg Intravenous Daily    furosemide  40 mg Oral BID    guaiFENesin  600 mg Oral BID    metoprolol tartrate  50 mg Oral TID     PRN Meds:acetaminophen, albuterol-ipratropium, benzonatate, dextromethorphan-guaiFENesin  mg/5 ml, melatonin, ondansetron, oxyCODONE, prochlorperazine, sodium chloride 0.9%     Review of patient's allergies indicates:  No Known Allergies  Objective:     Vital Signs (Most Recent):  Temp: 98.4 °F (36.9 °C) (03/21/23 0716)  Pulse: 99 (03/21/23 0753)  Resp: 17 (03/21/23 0753)  BP: (!) 113/59 (03/21/23 0716)  SpO2: 96 % (03/21/23 0753)   Vital Signs (24h Range):  Temp:  [97.9 °F (36.6 °C)-98.8 °F (37.1 °C)] 98.4 °F (36.9 °C)  Pulse:  [] 99  Resp:  [17-20] 17  SpO2:  [91 %-98 %] 96 %  BP: (100-130)/(54-85) 113/59     Weight: 80 kg (176 lb 5.9 oz)  Body mass index is 25.31 kg/m².    Intake/Output - Last 3 Shifts         03/19 0700 03/20 0659 03/20 0700 03/21 0659 03/21 0700 03/22 0659    P.O. 120 840     IV Piggyback 391.6      Total Intake(mL/kg) 511.6 (6.4) 840 (10.5)     Urine (mL/kg/hr) 2775 (1.4) 1800 (0.9)     Total Output 2775 1800     Net -2263.4 -960                    Physical Exam  Vitals reviewed.   Constitutional:       General: He is not in acute distress.     Appearance: He is not ill-appearing.   HENT:      Head: Normocephalic and atraumatic.   Cardiovascular:      Rate and Rhythm: Normal rate and regular rhythm.      Comments: R IJV port, no erythema or purulence at port pocket  Pulmonary:      Comments: Nasal cannula  Abdominal:      General: There  is no distension.   Musculoskeletal:         General: No deformity.   Skin:     General: Skin is warm and dry.   Neurological:      Mental Status: He is alert. He is disoriented.      Comments: Disoriented to place and time       Significant Labs:  I have reviewed all pertinent lab results within the past 24 hours.  CBC:   Recent Labs   Lab 03/21/23  0601   WBC 9.35   RBC 4.73   HGB 12.8*   HCT 41.0      MCV 87   MCH 27.1   MCHC 31.2*     CMP:   Recent Labs   Lab 03/18/23  0321 03/19/23  0339 03/21/23  0601   *   < > 109   CALCIUM 8.2*   < > 8.5*   ALBUMIN 2.4*  --   --    PROT 5.6*  --   --       < > 138   K 3.2*   < > 3.8   CO2 28   < > 29      < > 101   BUN 11   < > 9   CREATININE 0.8   < > 0.8   ALKPHOS 84  --   --    ALT 15  --   --    AST 19  --   --    BILITOT 0.7  --   --     < > = values in this interval not displayed.       Significant Diagnostics:  I have reviewed all pertinent imaging results/findings within the past 24 hours.

## 2023-03-21 NOTE — PROGRESS NOTES
Patient is NPO this morning for removal of Mae Cath. Dr Klein attempted to contact spouse for consent. Nurse to contact MD once wife arrives. Patient will remain NPO until decision is made to remove PAC today or tomorrow. Mid Line also replaced today by PICC Team for ABX.

## 2023-03-21 NOTE — OP NOTE
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 male with multiple medical co morbidities, possible port infection w no plans for further chemotherapy. After discussion of disease process, we discussed options and have elected for operation to remove right chest portacath.    Description of Procedure: After consent was obtained, patient was taken to the OR. The right check/neck was prepped and draped in a standard sterile fashion after local/mac anesthesia was started. Time out was performed. We began the procedure by injecting lidocaine along the old incision superior to the port. We used a 15 blade to cut down to the catheter and removed the port and catheter intract, completely. No purulent fluid. The area was held with pressure, and the neck was held with pressure. The patient was in trendelenberg for removal of the port. We closed the incision with dermal 3-0 vicryl and a subcuticular 4-0 monocryl covered by dermabond.  All counts were correct x2. Patient was aroused from anesthesia and taken to the recovery room in a stable condition having suffered no issues at this time.    Description of the findings of the procedure: port, no gross infection, removed completely    Estimated Blood Loss: * No values recorded between 3/21/2023 12:00 AM and 3/21/2023  1:38 PM *         Specimens:   Portacath - gross only      Drains/Implants: None

## 2023-03-21 NOTE — NURSING
PIV not flushing. Nurse attempted to remove. Pt refused to give nurse access to arm to remove line. Pt pending midline placement.

## 2023-03-21 NOTE — PLAN OF CARE
Problem: Adult Inpatient Plan of Care  Goal: Plan of Care Review  Outcome: Ongoing, Progressing     Problem: Fluid Imbalance (Pneumonia)  Goal: Fluid Balance  Outcome: Ongoing, Progressing     Problem: Respiratory Compromise (Pneumonia)  Goal: Effective Oxygenation and Ventilation  Outcome: Ongoing, Progressing     Problem: Skin Injury Risk Increased  Goal: Skin Health and Integrity  Outcome: Ongoing, Progressing

## 2023-03-21 NOTE — PROCEDURES
"Kashif Iverson is a 62 y.o. male patient.    Temp: 98.4 °F (36.9 °C) (03/21/23 0716)  Pulse: 99 (03/21/23 0753)  Resp: 17 (03/21/23 0753)  BP: (!) 113/59 (03/21/23 0716)  SpO2: 96 % (03/21/23 0753)  Weight: 80 kg (176 lb 5.9 oz) (03/19/23 2221)  Height: 5' 10" (177.8 cm) (03/16/23 0239)    PICC  Date/Time: 3/21/2023 9:02 AM  Consent Done: Yes  Time out: Immediately prior to procedure a time out was called to verify the correct patient, procedure, equipment, support staff and site/side marked as required  Indications: med administration and vascular access  Anesthesia: local infiltration  Local anesthetic: lidocaine 1% without epinephrine  Anesthetic Total (mL): 3  Preparation: skin prepped with ChloraPrep  Skin prep agent dried: skin prep agent completely dried prior to procedure  Sterile barriers: all five maximum sterile barriers used - cap, mask, sterile gown, sterile gloves, and large sterile sheet  Hand hygiene: hand hygiene performed prior to central venous catheter insertion  Location details: right basilic  Catheter type: single lumen  Catheter size: 4 Fr  Catheter Length: 10cm    Ultrasound guidance: yes  Vessel Caliber: medium and patent, compressibility normal  Vascular Doppler: not done  Needle advanced into vessel with real time Ultrasound guidance.  Guidewire confirmed in vessel.  Sterile sheath used.  no esophageal manometryNumber of attempts: 1  Post-procedure: blood return through all ports, chlorhexidine patch and sterile dressing applied  Estimated blood loss (mL): 0  Specimens: No  Implants: No  Assessment: successful placement  Complications: none        Name NEERAJ Sanders  3/21/2023    "

## 2023-03-21 NOTE — PT/OT/SLP PROGRESS
Physical Therapy      Patient Name:  Kashif Iverson   MRN:  [id number]    Patient not seen today secondary to Off the floor for procedure/surgery. (Removal of tete cath). Will follow-up.

## 2023-03-21 NOTE — ANESTHESIA PREPROCEDURE EVALUATION
03/21/2023    Pre-operative evaluation for Procedure(s) (LRB):  REMOVAL, CATHETER, CENTRAL VENOUS, TUNNELED, WITH PORT (Right)    Kashif Iverson is a 62 y.o. male     Patient Active Problem List   Diagnosis    Former smoker    Multiple pulmonary nodules    Macrocytic anemia    History of completed stroke    Poor dentition    Lung cancer, main bronchus, left    Secondary malignant neoplasm of bone    Immunodeficiency due to drugs    Atherosclerosis of aorta    Bronchiectasis without complication    Essential hypertension    History of non-ST elevation myocardial infarction (NSTEMI)    Hypokalemia    Multifocal atrial tachycardia    Chronic diastolic heart failure    Delirium due to general medical condition    Acute encephalopathy    PAD (peripheral artery disease)    Coronary artery disease involving native coronary artery of native heart without angina pectoris    Severe sepsis    Dependence on supplemental oxygen    COPD (chronic obstructive pulmonary disease)    Acute on chronic respiratory failure with hypoxia    Persistent atrial fibrillation    Debility    Advanced care planning/counseling discussion    Squamous cell carcinoma of lung    Pleural effusion    Influenza A    Prolonged Q-T interval on ECG    Acinetobacter lwoffi Bacteremia    Pneumonia       Review of patient's allergies indicates:  No Known Allergies    Current Facility-Administered Medications on File Prior to Encounter   Medication Dose Route Frequency Provider Last Rate Last Admin    heparin, porcine (PF) 100 unit/mL injection flush 500 Units  500 Units Intravenous PRN Sameera Amador MD   500 Units at 01/20/23 1652    sodium chloride 0.9% flush 10 mL  10 mL Intravenous PRN Sameera Amador MD   10 mL at 01/20/23 1652     Current Outpatient Medications on File Prior to Encounter   Medication Sig  Dispense Refill    albuterol (VENTOLIN HFA) 90 mcg/actuation inhaler Inhale 2 puffs into the lungs every 6 (six) hours as needed for Wheezing. Rescue 18 g 11    albuterol-ipratropium (DUO-NEB) 2.5 mg-0.5 mg/3 mL nebulizer solution Inhale contents of 1 vial (3 mLs) by nebulization every 4 (four) hours as needed for Wheezing or Shortness of Breath. Rescue 90 mL 1    amLODIPine (NORVASC) 10 MG tablet Take 1 tablet (10 mg total) by mouth once daily. 90 tablet 3    apixaban (ELIQUIS) 5 mg Tab Take 1 tablet (5 mg total) by mouth 2 (two) times daily. 60 tablet 1    ascorbic acid-multivit-min (VITAMIN C ENERGY BOOSTER) 1,000 mg PwEP Take 3,000 mg by mouth once daily. Patient takes 3,000 mg per day      aspirin 81 MG Chew Take 1 tablet (81 mg total) by mouth once daily. 30 tablet 11    atorvastatin (LIPITOR) 80 MG tablet Take 1 tablet (80 mg total) by mouth once daily. 90 tablet 3    cilostazoL (PLETAL) 100 MG Tab Take 1 tablet (100 mg total) by mouth 2 (two) times daily. 60 tablet 11    furosemide (LASIX) 40 MG tablet Take 1 tablet (40 mg total) by mouth 2 (two) times a day. 60 tablet 2    metoprolol tartrate (LOPRESSOR) 50 MG tablet Take 1 tablet (50 mg total) by mouth 3 (three) times daily. 90 tablet 11    oxyCODONE (ROXICODONE) 15 MG Tab TAKE 1 PO AT NIGHT PRN PAIN AND 1/3 PILL DAILY PRN PAIN 40 tablet 0    polyethylene glycol (GLYCOLAX) 17 gram/dose powder Mix 1 capful (17 grams total) with a liquid and take by mouth once daily. 510 g 0    tiotropium (SPIRIVA) 18 mcg inhalation capsule Inhale 1 capsule (18 mcg total) into the lungs via handihaler once daily. Controller 30 capsule 1         LABS  CBC:   Recent Labs     03/20/23  0544 03/21/23  0601   WBC 9.12 9.35   RBC 4.62 4.73   HGB 12.8* 12.8*   HCT 39.4* 41.0    282   MCV 85 87   MCH 27.7 27.1   MCHC 32.5 31.2*       CMP:   Recent Labs     03/20/23  0544 03/21/23  0601    138   K 3.7 3.8    101   CO2 28 29   BUN 6* 9   CREATININE 0.8  0.8    109   MG 1.5* 1.9   CALCIUM 8.5* 8.5*       Pre-op Assessment    I have reviewed the Patient Summary Reports.    I have reviewed the NPO Status.   I have reviewed the Medications.     Review of Systems  Anesthesia Hx:  No problems with previous Anesthesia  History of prior surgery of interest to airway management or planning: Denies Family Hx of Anesthesia complications.   Denies Personal Hx of Anesthesia complications.   Social:  Former Smoker    Hematology/Oncology:        Current/Recent Cancer. Oncology Comments: SCC of lung + mets to bone   Cardiovascular:   Hypertension Past MI CAD  Dysrhythmias atrial fibrillation    Pulmonary:   Pneumonia COPD    Renal/:  Renal/ Normal     Musculoskeletal:  Musculoskeletal Normal    Neurological:   CVA        Physical Exam  General: Well nourished, Cooperative and Alert    Airway:  Mouth Opening: Normal  TM Distance: Normal  Tongue: Normal    Dental:Poor dentition  Chest/Lungs:  Normal Respiratory Rate    Heart:  Rate: Normal  Rhythm: Regular Rhythm        Anesthesia Plan  Type of Anesthesia, risks & benefits discussed:    Anesthesia Type: MAC, Gen Natural Airway  Intra-op Monitoring Plan: Standard ASA Monitors  Induction:  IV  Informed Consent: Informed consent signed with the Patient and all parties understand the risks and agree with anesthesia plan.  All questions answered.   ASA Score: 3    Ready For Surgery From Anesthesia Perspective.     .

## 2023-03-21 NOTE — PROGRESS NOTES
"Subjective:      Patient ID: Kashif Iverson is a 62 y.o. male.  Pt returns to clinic for hospital f/u, see course below. Was admitted for progressing hypoxia with pleural effusion. Today pt states he is "ok". Breathing is stable, he's not moving much at home due to fatigue, weakness and increasing SOB. Sats dropping into the 70's with any activity. Is staying on 2-3L oxygen at all times. Denies any acute complaints today.       Admission Date: 2/28/2023  Hospital Length of Stay: 3 days  Discharge Date and Time:  03/03/2023 4:06 PM    HPI:   This is a 61 year old male with a PMHx of SCC of the lung with metastasis to the bone (on maintenance gemcitabine and radiation, s/p chemoradiation and immunotherapy), COPD/bronchiestasis (on 2L O2), Afib (on Eliquis), HTN, HFpEF (EF: 60%), CVA, CAD, PAD who presented with SOB.      Patient was sent from oncology clinic to the ED due to hypoxia.  Patient denies having any complaints and reports feeling fine.  He has multiple prior hospitalizations for similar presentation, related to COPD exacerbation/volume overload. In the ED, the patient was tachypneic, hypoxic requiring a venturi mask (50%, 15 L/min).  Left showed an elevated BNP (364), elevated troponin (0.031).  Chest x-ray showed small right and moderate left pleural effusions and patchy airspace disease throughout the left lung.  The patient was given Lasix 80 mg IV.  Solu-Medrol 125 mg IV, and DuoNeb x1.  He was admitted for further management.     Hospital Course:    61 year old male with a PMHx of SCC of the lung with metastasis to the bone, COPD (on 2L home O2), Afib (on Eliquis), HTN, HFpEF (EF: 60%) admitted 02/28/2023 for acute on chronic respiratory failure with hypoxia, COPD exacerbation, bilateral pleural effusions.  Presented to oncology clinic hypoxic with O2 sats of 88% on 6 L. required Venti mask initially for improvement.    Labs overall unremarkable.  Chest x-ray w/ Small  right and moderate left pleural " effusions with underlying airspace disease and/or atelectasis not excluded.  Patchy airspace disease throughout the left lung.  Persistent masslike opacity in the left suprahilar region with adjacent atelectasis or scarring. Was given Lasix and Solu-Medrol in the ED and started on COPD pathway. Palliative care team consulted. Patient and wife not ready for hospice at this time, but agreeable to have NP from palliative to visit home on discharge. Able to wean O2 down to 3L NC. This may be patient's new baseline.     He states he feels good today and a lot better compared to presentation on admission.  Other requiring supplemental oxygen, patient denies feeling any shortness of breath.  Has a dry cough, but denies any difficulty breathing.  Admits lower back pain that has been chronic, near baseline. Pt denies any fever, headaches, vision changes, chest pain, shortness of breath, palpitations, abdominal pain, nausea, vomiting, or any new weaknesses. Feels ready to go home. Patient's exam on discharge was as follow: Patient is alert and oriented, appears in no acute distress but is chronically ill appearing, heart with regular rate and rhythm, lungs with ronchi, but no wheezing, abdomen soft and nontender, and no new weaknesses or focal deficits seen. Bilateral lower extremities without any edema or calf tenderness.      Patient was counseled regarding any abnormal labs, differential diagnosis, treatment options, risk-benefit, lifestyle changes, prognosis, current condition, and medications. Patient was interactive and attentive.  Patient's questions were answered in a respectful and timely manner. Patient was instructed to follow-up with PCP within 1 week and to continue taking medications as prescribed.  Instructed to also follow up with oncologist as well. Referral for palliative NP to visit home placed. Also, extensively discussed the risks, benefits, and side effects of patient's medications. Discussed with  patient about any medication changes. Patient verbalized understanding and agrees to treatment plan.  Patient is stable for discharge.  Patient has no other questions or concerns at this time.  ED precautions discussed with the patient. Also called and spoke with the patient's spouse and updated her on plan of care     Vital signs are stable. Ambulating without any difficulty. Tolerating p.o. intake without any nausea or vomiting. Afebrile for over 24 hours. Patient is in stable condition and has no questions or concerns. Patient will be discharge to home once transportation secured . Prescriptions sent to pharmacy.  CM/SW to assist with discharge planning.     Review of Systems   Constitutional:  Positive for activity change, appetite change and fatigue. Negative for fever and unexpected weight change.   HENT:  Negative for nasal congestion, ear pain, postnasal drip, rhinorrhea, sneezing, sore throat and voice change.    Eyes:  Negative for pain and visual disturbance.   Respiratory:  Negative for cough, chest tightness and shortness of breath.    Cardiovascular:  Negative for chest pain, palpitations and leg swelling.   Gastrointestinal:  Negative for abdominal pain, change in bowel habit, constipation, diarrhea, nausea, vomiting and change in bowel habit.   Endocrine: Negative.    Genitourinary:  Negative for difficulty urinating.   Musculoskeletal:  Positive for arthralgias, back pain and myalgias. Negative for gait problem.   Integumentary:  Positive for color change. Negative for rash.   Allergic/Immunologic: Negative.    Neurological:  Positive for weakness. Negative for dizziness, vertigo, tremors, seizures, syncope, facial asymmetry, speech difficulty, light-headedness, numbness, headaches, coordination difficulties, memory loss and coordination difficulties.   Hematological: Negative.    Psychiatric/Behavioral: Negative.     All other systems reviewed and are negative.      Objective:     Vitals:     "03/08/23 1044 03/08/23 1046   BP: 112/70    Pulse: 71    Resp: (!) 26    Temp: 98.4 °F (36.9 °C)    TempSrc: Oral    SpO2: (!) 79%  Comment: on 2 liters oxygen (!) 94%  Comment: after sitting on 2 liters oxygen   Weight: 79.2 kg (174 lb 9.7 oz)    Height: 5' 10" (1.778 m)      Physical Exam  Vitals and nursing note reviewed.   Constitutional:       General: He is not in acute distress.     Appearance: Normal appearance. He is well-developed, well-groomed and normal weight. He is ill-appearing.      Interventions: Nasal cannula in place.      Comments: 3L O2 NC   HENT:      Head: Normocephalic and atraumatic.      Right Ear: External ear normal.      Left Ear: External ear normal.      Nose: Nose normal.      Mouth/Throat:      Lips: Pink.      Mouth: Mucous membranes are moist.   Eyes:      General: Lids are normal. Vision grossly intact. Gaze aligned appropriately.      Conjunctiva/sclera: Conjunctivae normal.      Pupils: Pupils are equal, round, and reactive to light.   Neck:      Trachea: Phonation normal.   Cardiovascular:      Rate and Rhythm: Normal rate and regular rhythm.      Heart sounds: Murmur heard.   Pulmonary:      Effort: Tachypnea, accessory muscle usage and retractions present. No prolonged expiration or respiratory distress.      Breath sounds: Normal air entry. No transmitted upper airway sounds. Examination of the right-lower field reveals rales. Examination of the left-lower field reveals rales. Decreased breath sounds (diffuse with shallow breathing), rhonchi (diffuse) and rales present. No wheezing.   Abdominal:      General: Abdomen is flat. Bowel sounds are normal. There is no distension.      Palpations: Abdomen is soft.      Tenderness: There is no abdominal tenderness.   Musculoskeletal:      Cervical back: Neck supple.      Right lower leg: No edema.      Left lower leg: No edema.   Skin:     General: Skin is cool and dry.      Capillary Refill: Capillary refill takes more than 3 " seconds.      Coloration: Skin is ashen, cyanotic (peripheral, toes with clubbing) and pale (dusky).      Findings: No rash.   Neurological:      General: No focal deficit present.      Mental Status: He is alert and oriented to person, place, and time. Mental status is at baseline.      Sensory: Sensation is intact.      Motor: Weakness present. No abnormal muscle tone.      Coordination: Coordination is intact.      Gait: Gait is intact.   Psychiatric:         Attention and Perception: Attention and perception normal.         Mood and Affect: Mood is depressed. Affect is blunt.         Speech: Speech normal.         Behavior: Behavior normal. Behavior is cooperative.         Thought Content: Thought content normal. Thought content does not include homicidal or suicidal ideation. Thought content does not include homicidal or suicidal plan.         Cognition and Memory: Cognition and memory normal.         Judgment: Judgment normal.     Assessment and Plan:     1. Acute hypoxemic respiratory failure  Progressing condition with visible increased work of breathing at rest without acute distress, increased to 3L after walking due to desat in 70's then oxygen sats improved to mid 90's while sitting and maintained on 2L, explicit emergency precautions discussed, has home palliative scheduled to proceed with advanced care planning     2. Pleural effusion  Same as above            FABIENNE Lawler, FNP-C  Family/Internal Medicine  [facility]

## 2023-03-21 NOTE — PROGRESS NOTES
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     Patient presented with severe respiratory distress that started shortly prior to presentation.  Per his wife, he started having worsening shortness of breath despite receiving DuoNebs and she noted that he was choking.  Despite being on 4 oxygen at home, he was hypoxic to 65%.  Additional symptoms include decreased p.o. intake and diarrhea which improved.    In the ED, the patient was tachycardic (up to 160), tachypneic and hypoxic requiring BiPAP.  Labs were remarkable for leukocytosis (32.7), elevated D-dimer (> 33.0), elevated BNP (272), elevated troponin (0.045), elevated lactic acid (6.7) and positive influenza A.  Chest x-ray showed persistent bilateral airspace and pleural opacities, slightly improved in the left upper lung since prior exam.  Left upper extremity ultrasound showed no thrombus in central veins.  CTA chest showed no PE, interval enlargement of known left supraclavicular mass/adenopathy, new masslike consolidation with air bronchograms in the right middle lobe, likely pneumonic process and new right hilar node measuring 26 x 22 mm..  He was given DuoNebs x3, 1.9 L of LR, 500 mL of NS, vancomycin, Zosyn, and was admitted for further management.          Overview/Hospital Course:  Mr Kashif Iverson was admitted with acute on chronic hypoxic respiratory  failure secondary to pneumonia and influenza A. Started antibitoics and tamiflu. Started vapotherm. Pulmonary following. He has had some confusion; CTH no acute changes. Improved respiratory status. Weaned to 5L NC. Blood cultures from admit with Acinetobacter bacteremia which has cleared. ID consulted. Stepped down to floor.  PT/OT consulted and rec: H/H on discharge       Interval History: No new issues. Feels better     Review of Systems   Constitutional:  Positive for activity change.   HENT:  Negative for congestion.    Respiratory:  Negative for chest tightness and shortness of breath.    Genitourinary:  Negative for difficulty urinating.   Objective:     Vital Signs (Most Recent):  Temp: 98.4 °F (36.9 °C) (03/21/23 0716)  Pulse: 99 (03/21/23 0753)  Resp: 17 (03/21/23 0753)  BP: (!) 113/59 (03/21/23 0716)  SpO2: 96 % (03/21/23 0753)   Vital Signs (24h Range):  Temp:  [97.9 °F (36.6 °C)-98.8 °F (37.1 °C)] 98.4 °F (36.9 °C)  Pulse:  [] 99  Resp:  [17-20] 17  SpO2:  [91 %-98 %] 96 %  BP: (100-130)/(54-85) 113/59     Weight: 80 kg (176 lb 5.9 oz)  Body mass index is 25.31 kg/m².    Intake/Output Summary (Last 24 hours) at 3/21/2023 0953  Last data filed at 3/21/2023 0644  Gross per 24 hour   Intake 720 ml   Output 1300 ml   Net -580 ml      Physical Exam  Vitals and nursing note reviewed.   Constitutional:       Appearance: Normal appearance.   Pulmonary:      Effort: Pulmonary effort is normal. No respiratory distress.   Neurological:      Mental Status: He is alert and oriented to person, place, and time.       Significant Labs: All pertinent labs within the past 24 hours have been reviewed.  BMP:   Recent Labs   Lab 03/21/23  0601         K 3.8      CO2 29   BUN 9   CREATININE 0.8   CALCIUM 8.5*   MG 1.9     CBC:   Recent Labs   Lab 03/20/23  0544 03/21/23  0601   WBC 9.12 9.35   HGB 12.8* 12.8*   HCT 39.4* 41.0    282       Significant Imaging:       Assessment/Plan:      *  Acute on chronic respiratory failure with hypoxia  -Admitted to inpatient status  -Patient admitted with Hypoxic which is Acute on Chronic.   -He is on home oxygen at 4 LPM.   -Signs/symptoms of respiratory failure included- tachypnea, increased work of breathing, respiratory distress, use of accessory muscles and wheezing present on admission.   -Labs and images were reviewed. Patient Has not has a recent ABG.   -Supplemental oxygen was provided and noted  -Respiratory failure is due to- Pneumonia, influenza, COPD and lung cancer  -Treated in ICU with vapotherm, antiviral, antibiotics and nebulizers.  Weaned to nasal canula and stepped down to the floor on 3/19  -Completes tamiflu today  -Continue antibiotics for bacteremia and possible bacterial pneumonia as below  -Currently on 5L NC O2 - wean as able.    -Add incentive spirometry, tessalon and guaifenesin    Pneumonia  -Influenza A + suspected bacterial pneumonia as well.   -Cough has been dry and have been unable to collect sputum culture.   -Blood cultures with Acinetobacter  -Treatment as above for Sepsis    Acinetobacter lwoffi Bacteremia  -Treatment as above.    Prolonged Q-T interval on ECG  -Noted- monitor     Influenza A  -Completes tamiflu day 5 today.    Advanced care planning/counseling discussion  Advance Care Planning  Date: 03/16/2023 Code Status Dr Marroquin discussed the patient's code status with the patient and his wife.  He wants to remain full code at this time.  Dr Marroquin spent a total of 2 minutes engaging the patient in this advance care planning discussion.  -I attempted to engage patient in goals of care discussion today but he quickly stated he was not interested in discussing this  -Palliative care consulted and input appreciated.  Fortunately their skilled discussions have been more productive.  Patient's wife is considering hospice care at discharge.      Persistent atrial fibrillation  -[id number] Score: 3   -Continue home metoprolol  and Eliquis  -Telemetry monitoring    COPD (chronic obstructive pulmonary disease)  -No signs of current exacerbation  -Pulmonology recommends starting triple therapy inhaler (Breztri or Trelegy) at discharge after he recovers from his pneumonia  -Continue nebs    Severe sepsis  -Patient admitted with severe sepsis  -Sepsis was due to influenza, possible secondary superimposed bacterial pneumonia of RML and Acinetobacter bacteremia.  -Completes day 5 of tamiflu today  -Blood cultures 3/15 grew Acinetobacter sensitive to cefepime.    -Repeat blood cultures 3/16 negative  -Source of bacteremia unclear - ?due to his port?  -Has been treated with vanc and zosyn x 5 days.  Will stop these and start cefepime.  -Consult general surgery for evaluation of port removal (no longer needs port as not a candidate for further chemo)  -Consult ID for antibiotic recommendations.    Coronary artery disease involving native coronary artery of native heart without angina pectoris  -No active chest pain or signs of ACS.   -Continue asa, statin and metoprolol    PAD (peripheral artery disease)  -No signs of lower extremity ulceration/wound  -Continue asa, statin     Acute encephalopathy  -At times he seems somewhat confused and removes his oxygen and intermittently does not remember the status of his lung cancer.   -Oriented to self and location but can't remember why he is in the hospital. He does have history of strokes.   -Other care providers state he has been confused like this on past admissions.   -Unclear if this is baseline or hospitalization or infection related  -CTH no acute changes.   -Improving  -Ordered delirium precautions      Chronic diastolic heart failure  -This is chronic diastolic chf  -Note: previously did have mixed systolic/diastolic CHF with EF down to 35% (2/2022). EF has recovered.   -Echo 9/22 showed EF 60%, normal diastolic function and pasp 40mmHg  - on admit  -Strict ins/outs and daily  weights  -Continue PO lasix and metoprolol.    -BP is soft and not much room to start ACEi/ARB or entresto at this time    Hypokalemia  -K normal today  -Repeat BMP in AM    Essential hypertension  -BP currently well controlled - SBP in low 100s  -Continue home metoprolol and amlodipine     Lung cancer, main bronchus, left  -He follows with Oncology.   -He was previously receiving palliative chemotherapy.   -Per most recently Oncology notes, he is no longer a candidate for chemotherapy and hospice was recommended  -Consult general surgery for evaluation of port removal  -Palliative care on board and are actively discussing possible hospice care at discharge.    History of completed stroke  -No new neurologic deficit on exam but somewhat confused at times.   -CTH without any acute changes  -Continue asa, eliquis, statin       VTE Risk Mitigation (From admission, onward)         Ordered     apixaban tablet 5 mg  2 times daily         03/16/23 0221     IP VTE HIGH RISK PATIENT  Once         03/16/23 0221     Place sequential compression device  Until discontinued         03/16/23 0221                Discharge Planning   [place]: 3/22/2023     Code Status: Full Code   Is the patient medically ready for discharge?:     Reason for patient still in hospital (select all that apply): Patient unstable  Discharge Plan A: Home with family, Home Health   Discharge Delays: None known at this time      On 5L.  Palliative care following. Home in 2-3 days. Will consider hospice on discharge        Carroll Syed MD  Department of Hospital Medicine   [facility] - Telemetry

## 2023-03-21 NOTE — ASSESSMENT & PLAN NOTE
61 yo male with complex medical history who is admitted for acute on chronic respiratory failure, + blood cultures    - Tentatively plan for port removal in OR today, Pt needs IV access, PICC team consulted.   - Call made to wife for consent, no answer this morning, will continue reaching out  - His oncologist confirmed that port is no longer needed  - NPO  - Please call with questions or concerns

## 2023-03-21 NOTE — PT/OT/SLP PROGRESS
Occupational Therapy      Patient Name:  Kashif Iverson   MRN:  [id number]    Patient not seen today secondary to pt MARCUS for procedure. Will follow-up as able.    3/21/2023

## 2023-03-21 NOTE — PROGRESS NOTES
[facility]  General Surgery  Progress Note    Subjective:     History of Present Illness:  Kashif Iverson is a 62 y.o. 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 He is flu positive. During this admission blood culture was positive for  Acinetobacter. General surgery was consulted for port removal. Patient is unsure when his port was placed but records indicate his chemo was started in 2019. He is unaware of any plans for ongoing chemo in the future.       Post-Op Info:  Procedure(s) (LRB):  REMOVAL, CATHETER, CENTRAL VENOUS, TUNNELED, WITH PORT (Right)         Interval History: No acute events. He remains disoriented. WBC within normal limits. He is NPO.     Medications:  Continuous Infusions:  Scheduled Meds:   albuterol-ipratropium  3 mL Nebulization Q4H    amLODIPine  10 mg Oral Daily    apixaban  5 mg Oral BID    aspirin  81 mg Oral Daily    atorvastatin  80 mg Oral Daily    ceFEPime (MAXIPIME) IVPB  2 g Intravenous Q8H    cilostazoL  100 mg Oral BID    famotidine (PF)  20 mg Intravenous Daily    furosemide  40 mg Oral BID    guaiFENesin  600 mg Oral BID    metoprolol tartrate  50 mg Oral TID     PRN Meds:acetaminophen, albuterol-ipratropium, benzonatate, dextromethorphan-guaiFENesin  mg/5 ml, melatonin, ondansetron, oxyCODONE, prochlorperazine, sodium chloride 0.9%     Review of patient's allergies indicates:  No Known Allergies  Objective:     Vital Signs (Most Recent):  Temp: 98.4 °F (36.9 °C) (03/21/23 0716)  Pulse: 99 (03/21/23 0753)  Resp: 17 (03/21/23 0753)  BP: (!) 113/59 (03/21/23 0716)  SpO2: 96 % (03/21/23 0753)   Vital Signs (24h Range):  Temp:  [97.9 °F (36.6 °C)-98.8 °F (37.1 °C)] 98.4 °F (36.9 °C)  Pulse:  []  99  Resp:  [17-20] 17  SpO2:  [91 %-98 %] 96 %  BP: (100-130)/(54-85) 113/59     Weight: 80 kg (176 lb 5.9 oz)  Body mass index is 25.31 kg/m².    Intake/Output - Last 3 Shifts         03/19 0700 03/20 0659 03/20 0700 03/21 0659 03/21 0700 03/22 0659    P.O. 120 840     IV Piggyback 391.6      Total Intake(mL/kg) 511.6 (6.4) 840 (10.5)     Urine (mL/kg/hr) 2775 (1.4) 1800 (0.9)     Total Output 2775 1800     Net -2263.4 -960                    Physical Exam  Vitals reviewed.   Constitutional:       General: He is not in acute distress.     Appearance: He is not ill-appearing.   HENT:      Head: Normocephalic and atraumatic.   Cardiovascular:      Rate and Rhythm: Normal rate and regular rhythm.      Comments: R IJV port, no erythema or purulence at port pocket  Pulmonary:      Comments: Nasal cannula  Abdominal:      General: There is no distension.   Musculoskeletal:         General: No deformity.   Skin:     General: Skin is warm and dry.   Neurological:      Mental Status: He is alert. He is disoriented.      Comments: Disoriented to place and time       Significant Labs:  I have reviewed all pertinent lab results within the past 24 hours.  CBC:   Recent Labs   Lab 03/21/23  0601   WBC 9.35   RBC 4.73   HGB 12.8*   HCT 41.0      MCV 87   MCH 27.1   MCHC 31.2*     CMP:   Recent Labs   Lab 03/18/23  0321 03/19/23  0339 03/21/23  0601   *   < > 109   CALCIUM 8.2*   < > 8.5*   ALBUMIN 2.4*  --   --    PROT 5.6*  --   --       < > 138   K 3.2*   < > 3.8   CO2 28   < > 29      < > 101   BUN 11   < > 9   CREATININE 0.8   < > 0.8   ALKPHOS 84  --   --    ALT 15  --   --    AST 19  --   --    BILITOT 0.7  --   --     < > = values in this interval not displayed.       Significant Diagnostics:  I have reviewed all pertinent imaging results/findings within the past 24 hours.    Assessment/Plan:     Acinetobacter lwoffi Bacteremia  63 yo male with complex medical history who is admitted for acute  on chronic respiratory failure, + blood cultures    - Tentatively plan for port removal in OR today, Pt needs IV access, PICC team consulted.   - Call made to wife for consent, no answer this morning, will continue reaching out  - His oncologist confirmed that port is no longer needed  - NPO  - Please call with questions or concerns        Rayshawn Klein MD  General Surgery  [facility] - Telemetry

## 2023-03-21 NOTE — SUBJECTIVE & OBJECTIVE
Interval History: No new issues. Feels better     Review of Systems   Constitutional:  Positive for activity change.   HENT:  Negative for congestion.    Respiratory:  Negative for chest tightness and shortness of breath.    Genitourinary:  Negative for difficulty urinating.   Objective:     Vital Signs (Most Recent):  Temp: 98.4 °F (36.9 °C) (03/21/23 0716)  Pulse: 99 (03/21/23 0753)  Resp: 17 (03/21/23 0753)  BP: (!) 113/59 (03/21/23 0716)  SpO2: 96 % (03/21/23 0753)   Vital Signs (24h Range):  Temp:  [97.9 °F (36.6 °C)-98.8 °F (37.1 °C)] 98.4 °F (36.9 °C)  Pulse:  [] 99  Resp:  [17-20] 17  SpO2:  [91 %-98 %] 96 %  BP: (100-130)/(54-85) 113/59     Weight: 80 kg (176 lb 5.9 oz)  Body mass index is 25.31 kg/m².    Intake/Output Summary (Last 24 hours) at 3/21/2023 0953  Last data filed at 3/21/2023 0644  Gross per 24 hour   Intake 720 ml   Output 1300 ml   Net -580 ml      Physical Exam  Vitals and nursing note reviewed.   Constitutional:       Appearance: Normal appearance.   Pulmonary:      Effort: Pulmonary effort is normal. No respiratory distress.   Neurological:      Mental Status: He is alert and oriented to person, place, and time.       Significant Labs: All pertinent labs within the past 24 hours have been reviewed.  BMP:   Recent Labs   Lab 03/21/23  0601         K 3.8      CO2 29   BUN 9   CREATININE 0.8   CALCIUM 8.5*   MG 1.9     CBC:   Recent Labs   Lab 03/20/23  0544 03/21/23  0601   WBC 9.12 9.35   HGB 12.8* 12.8*   HCT 39.4* 41.0    282       Significant Imaging:

## 2023-03-21 NOTE — PROGRESS NOTES
Returned from PACU post tete cath removal. Durabond noted to right upper chest region. No signs of swelling. Call bell in reach,bed low, SR up x3.

## 2023-03-21 NOTE — TRANSFER OF CARE
"Anesthesia Transfer of Care Note    Patient: Kashif Iverson    Procedure(s) Performed: Procedure(s) (LRB):  REMOVAL, CATHETER, CENTRAL VENOUS, TUNNELED, WITH PORT (Right)    Patient location: PACU    Anesthesia Type: general    Transport from OR: Transported from OR on 2-3 L/min O2 by NC with adequate spontaneous ventilation    Post pain: adequate analgesia    Post assessment: no apparent anesthetic complications and tolerated procedure well    Post vital signs: stable    Level of consciousness: awake and alert    Nausea/Vomiting: no nausea/vomiting    Complications: none    Transfer of care protocol was followed      Last vitals:   Visit Vitals  /82 (BP Location: Left arm, Patient Position: Lying)   Pulse 96   Temp 36.9 °C (98.4 °F) (Oral)   Resp (!) 25   Ht 5' 10" (1.778 m)   Wt 80 kg (176 lb 5.9 oz)   SpO2 100%   BMI 25.31 kg/m²     "

## 2023-03-22 NOTE — ASSESSMENT & PLAN NOTE
-Patient admitted with severe sepsis  -Sepsis was due to influenza, possible secondary superimposed bacterial pneumonia of RML and Acinetobacter bacteremia.  -Completes day 5 of tamiflu today  -Blood cultures 3/15 grew Acinetobacter sensitive to cefepime.    -Repeat blood cultures 3/16 negative  -Source of bacteremia unclear - ?due to his port?  -Has been treated with vanc and zosyn x 5 days.  Will stop these and start cefepime.  -Consult general surgery for evaluation of port removal (no longer needs port as not a candidate for further chemo)  -Consult ID for antibiotic recommendations.    Cefepime through 3/28.

## 2023-03-22 NOTE — PROGRESS NOTES
Monitored this shift. No changes in status. Incision intact with Durabond. Right upper arm Mid-Line in place.

## 2023-03-22 NOTE — PROGRESS NOTES
[facility] - [facility]  General Surgery  Progress Note    Subjective:     History of Present Illness:  Kashif Iverson is a 62 y.o. 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 He is flu positive. During this admission blood culture was positive for  Acinetobacter. General surgery was consulted for port removal. Patient is unsure when his port was placed but records indicate his chemo was started in 2019. He is unaware of any plans for ongoing chemo in the future.       Post-Op Info:  Procedure(s) (LRB):  REMOVAL, CATHETER, CENTRAL VENOUS, TUNNELED, WITH PORT (Right)   1 Day Post-Op     Interval History: He underwent port removal yesterday. Did well overnight. Pain is well-controlled.     Medications:  Continuous Infusions:  Scheduled Meds:   albuterol-ipratropium  3 mL Nebulization Q6H WAKE    amLODIPine  10 mg Oral Daily    apixaban  5 mg Oral BID    aspirin  81 mg Oral Daily    atorvastatin  80 mg Oral Daily    ceFEPime (MAXIPIME) IVPB  2 g Intravenous Q8H    cilostazoL  100 mg Oral BID    famotidine (PF)  20 mg Intravenous Daily    furosemide  40 mg Oral BID    guaiFENesin  600 mg Oral BID    metoprolol tartrate  50 mg Oral TID     PRN Meds:acetaminophen, albuterol-ipratropium, benzonatate, dextromethorphan-guaiFENesin  mg/5 ml, melatonin, ondansetron, oxyCODONE, prochlorperazine, sodium chloride 0.9%     Review of patient's allergies indicates:  No Known Allergies  Objective:     Vital Signs (Most Recent):  Temp: 98.3 °F (36.8 °C) (03/22/23 0719)  Pulse: 84 (03/22/23 0752)  Resp: 18 (03/22/23 0752)  BP: (!) 113/57 (03/22/23 0719)  SpO2: (!) 93 % (03/22/23 0752)   Vital Signs (24h Range):  Temp:  [97.4 °F (36.3 °C)-98.9 °F (37.2 °C)] 98.3 °F (36.8  °C)  Pulse:  [] 84  Resp:  [15-25] 18  SpO2:  [80 %-100 %] 93 %  BP: (107-134)/(57-84) 113/57     Weight: 80 kg (176 lb 5.9 oz)  Body mass index is 25.31 kg/m².    Intake/Output - Last 3 Shifts         03/20 0700  03/21 0659 03/21 0700  03/22 0659 03/22 0700  03/23 0659    P.O. 840 120 120    IV Piggyback  300     Total Intake(mL/kg) 840 (10.5) 420 (5.3) 120 (1.5)    Urine (mL/kg/hr) 1800 (0.9) 600 (0.3) 300 (1.2)    Total Output 1800 600 300    Net -960 -180 -180                   Physical Exam  Vitals reviewed.   Constitutional:       General: He is not in acute distress.     Appearance: He is not ill-appearing.   HENT:      Head: Normocephalic and atraumatic.   Cardiovascular:      Rate and Rhythm: Normal rate and regular rhythm.      Comments: R IJV port, no erythema or purulence at port pocket  Pulmonary:      Comments: R chest port site incision intact, no swelling or hematoma  Abdominal:      General: There is no distension.   Musculoskeletal:         General: No deformity.   Skin:     General: Skin is warm and dry.   Neurological:      Mental Status: He is alert. He is disoriented.      Comments: Disoriented to place and time       Significant Labs:  I have reviewed all pertinent lab results within the past 24 hours.  CBC:   Recent Labs   Lab 03/21/23  0601   WBC 9.35   RBC 4.73   HGB 12.8*   HCT 41.0      MCV 87   MCH 27.1   MCHC 31.2*       CMP:   Recent Labs   Lab 03/18/23  0321 03/19/23  0339 03/22/23  0341   *   < > 106   CALCIUM 8.2*   < > 8.5*   ALBUMIN 2.4*  --   --    PROT 5.6*  --   --       < > 138   K 3.2*   < > 3.9   CO2 28   < > 28      < > 105   BUN 11   < > 10   CREATININE 0.8   < > 0.8   ALKPHOS 84  --   --    ALT 15  --   --    AST 19  --   --    BILITOT 0.7  --   --     < > = values in this interval not displayed.         Significant Diagnostics:  I have reviewed all pertinent imaging results/findings within the past 24 hours.    Assessment/Plan:      Acinetobacter lwoffi Bacteremia  61 yo male with complex medical history who is admitted for acute on chronic respiratory failure, + blood cultures  S/p port removal on 3/21    - Port site incision intact, please notify if any concerns arise with this site  - Can resume Eliquis tomorrow morning   - His oncologist confirmed that port is no longer needed  - Advance diet as tolerated  - Please call with questions or concerns        Rayshawn Klein MD  General Surgery  [facility] [place] - Telemetry

## 2023-03-22 NOTE — ASSESSMENT & PLAN NOTE
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 Repeat bcx 3/16 NGTD. Day 6 abx, now cefepime. Received tamiflu for influenza.  QTc Int : 531 ms. ID consulted for "acinetobacter bacteremia. note he does have a port in place, previously being used for chemo, now no longer chemo candidate- does this need to be removed?    Most likely source of bacteremia was RML pneumonia- aspiration? Post flu bacterial pneumonia? Post-obstuctive pna 2/2 known lung cancer? Agree that there is concern that port could have been seeded from bacteremia and appreciate surgery help with removal    Recommendations:   - continue cefepime  - aspiration precautions  - midline. Anticipate 14 days iv abx. Can not use quinolones with prolonged qtc  - consult RT for Aerobika and  on use.   - consider hypertonic saline nebulization for airway/mucous clearance.    Outpatient Antibiotic Therapy Plan:    Please send referral to [facility].    1) Infection: bacteremia    2) Discharge Antibiotics:    Intravenous antibiotics:   Cefepime 2gm iv q8h (or 6gm daily continuous infusion)    3) Therapy Duration:  14 days    Estimated end date of IV antibiotics: 3/28    4) Outpatient Weekly Labs:    Order the following labs to be drawn on Mondays:    CBC   CMP    CRP    5) Fax Lab Results to Infectious Diseases Provider: Dr Celestin    [facility] Fax Number: [phone]    6) Outpatient Infectious Diseases Follow-up     Follow-up appointment will be arranged by the ID clinic and will be found in the patient's appointments tab.     Prior to discharge, please ensure the patient's follow-up has been scheduled.     If there is still no follow-up scheduled prior to discharge, please send an EPIC message to Kristy Baldwin in " Infectious Diseases.

## 2023-03-22 NOTE — CONSULTS
"[facility] - Telemetry  Infectious Disease  Consult Note    Patient Name: Kashif Iverson  MRN: [id number]  Admission Date: 3/15/2023  Hospital Length of Stay: 5 days  Attending Physician: Carroll Ambriz MD  Primary Care Provider: Eduardo Ruiz MD     Isolation Status: Droplet    Patient information was obtained from patient and ER records.      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 Repeat bcx 3/16 NGTD. Day 6 abx, now cefepime. Received tamiflu for influenza.  QTc Int : 531 ms. ID consulted for "acinetobacter bacteremia. note he does have a port in place, previously being used for chemo, now no longer chemo candidate- does this need to be removed?    Most likely source of bacteremia was RML pneumonia- aspiration? Post flu bacterial pneumonia? Post-obstuctive pna 2/2 known lung cancer? Agree that there is concern that port could have been seeded from bacteremia and appreciate surgery help with removal    Recommendations:   - continue cefepime  - aspiration precautions  - midline. Anticipate 14 days iv abx. Can not use quinolones with prolonged qtc  - consult RT for Aerobika and  on use.   - consider hypertonic saline nebulization for airway/mucous clearance.    Outpatient Antibiotic Therapy Plan:    Please send referral to [facility].    1) Infection: bacteremia    2) Discharge Antibiotics:    Intravenous antibiotics:   Cefepime 2gm iv q8h (or 6gm daily continuous infusion)    3) Therapy Duration:  14 days    Estimated end date of IV antibiotics: 3/28    4) Outpatient Weekly Labs:    Order the following labs to be drawn on Mondays:    CBC   CMP    CRP    5) Fax Lab Results to Infectious Diseases Provider: Dr" Sabi    [facility] Fax Number: [phone]    6) Outpatient Infectious Diseases Follow-up     Follow-up appointment will be arranged by the ID clinic and will be found in the patient's appointments tab.     Prior to discharge, please ensure the patient's follow-up has been scheduled.     If there is still no follow-up scheduled prior to discharge, please send an EPIC message to Kristy Baldwin in Infectious Diseases.                  Thank you for your consult. I will sign off. Please contact us if you have any additional questions.    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 Initially in ICU, now floor status. Pt unclear event leading up until admission, but wife (and cousin) are at bedside and repeat she was short of breath all of a sudden and they checked oxygen level and it was in 60%. Currently getting breathing treatment. Reports some productive phlegm, but otherwise denies complaints. Denies pain or soreness at port.      Bcx in one of two sets from admit with acinetobacter  Repeat bcx 3/16 NGTD    Blood Culture, Routine ACINETOBACTER LWOFFII GROUP Abnormal     Resulting Agency [facility]        Susceptibility     Acinetobacter lwoffii group     CULTURE, BLOOD     Amikacin <=16 mcg/mL Sensitive     Amp/Sulbactam <=8/4 mcg/mL Sensitive     Cefepime 4 mcg/mL Sensitive     Ceftriaxone 8 mcg/mL Sensitive     Ciprofloxacin <=1 mcg/mL Sensitive     Gentamicin <=4 mcg/mL Sensitive     Levofloxacin <=2 mcg/mL Sensitive     Meropenem <=1 mcg/mL Sensitive     Minocycline <=4 mcg/mL Sensitive     Tetracycline <=4 mcg/mL  "Sensitive     Tobramycin <=4 mcg/mL Sensitive     Trimeth/Sulfa <=2/38 mcg/mL Sensitive               Received 5 days vanc/zosyn, now cefepime    Received tamiflu     Ct chest  New masslike consolidation with air bronchograms in the right middle lobe, likely pneumonic process.  Underlying neoplastic process not excluded.       QTc Int : 531 ms     ID consulted for "acinetobacter bacteremia. note he does have a port in place, previously being used for chemo, now no longer chemo candidate- does this need to be removed?      Interval history: s/p port removal and midline placement today. Tolerating abx. Getting breathing treatment post- procedure. Denies new complaints      Past Surgical History:   Procedure Laterality Date    BRONCHOSCOPY N/A 08/30/2019    Procedure: Bronchoscopy;  Surgeon: [facility] Diagnostic Provider;  Location: [facility] OR [address] [place] [address];  Service: Anesthesiology;  Laterality: N/A;    CORONARY ANGIOGRAPHY N/A 03/04/2022    Procedure: ANGIOGRAM, CORONARY ARTERY;  Surgeon: Robson Green MD;  Location: [facility] CATH LAB;  Service: Cardiology;  Laterality: N/A;    INSERTION OF TUNNELED CENTRAL VENOUS CATHETER (CVC) WITH SUBCUTANEOUS PORT         Review of patient's allergies indicates:  No Known Allergies    Current Facility-Administered Medications on File Prior to Encounter   Medication    heparin, porcine (PF) 100 unit/mL injection flush 500 Units    sodium chloride 0.9% flush 10 mL     Current Outpatient Medications on File Prior to Encounter   Medication Sig    albuterol (VENTOLIN HFA) 90 mcg/actuation inhaler Inhale 2 puffs into the lungs every 6 (six) hours as needed for Wheezing. Rescue    albuterol-ipratropium (DUO-NEB) 2.5 mg-0.5 mg/3 mL nebulizer solution Inhale contents of 1 vial (3 mLs) by nebulization every 4 (four) hours as needed for Wheezing or Shortness of Breath. Rescue    amLODIPine (NORVASC) 10 MG tablet Take 1 tablet (10 mg total) by mouth once daily.    apixaban (ELIQUIS) 5 mg Tab Take " 1 tablet (5 mg total) by mouth 2 (two) times daily.    ascorbic acid-multivit-min (VITAMIN C ENERGY BOOSTER) 1,000 mg PwEP Take 3,000 mg by mouth once daily. Patient takes 3,000 mg per day    aspirin 81 MG Chew Take 1 tablet (81 mg total) by mouth once daily.    atorvastatin (LIPITOR) 80 MG tablet Take 1 tablet (80 mg total) by mouth once daily.    cilostazoL (PLETAL) 100 MG Tab Take 1 tablet (100 mg total) by mouth 2 (two) times daily.    furosemide (LASIX) 40 MG tablet Take 1 tablet (40 mg total) by mouth 2 (two) times a day.    metoprolol tartrate (LOPRESSOR) 50 MG tablet Take 1 tablet (50 mg total) by mouth 3 (three) times daily.    oxyCODONE (ROXICODONE) 15 MG Tab TAKE 1 PO AT NIGHT PRN PAIN AND 1/3 PILL DAILY PRN PAIN    polyethylene glycol (GLYCOLAX) 17 gram/dose powder Mix 1 capful (17 grams total) with a liquid and take by mouth once daily.    tiotropium (SPIRIVA) 18 mcg inhalation capsule Inhale 1 capsule (18 mcg total) into the lungs via handihaler once daily. Controller     Family History       Problem Relation (Age of Onset)    Cancer Father, Sister    Diabetes Mother    Heart defect Mother    No Known Problems Son          Tobacco Use    Smoking status: Former     Packs/day: 1.00     Years: 25.00     Pack years: 25.00     Types: Cigarettes     Quit date: 2020     Years since quitting: 3.2    Smokeless tobacco: Never   Substance and Sexual Activity    Alcohol use: Yes     Comment: 11/3/22: Occ.    Drug use: Never    Sexual activity: Yes     Partners: Female     Review of Systems   Constitutional:  Positive for appetite change.   HENT: Negative.     Eyes: Negative.    Respiratory:  Positive for shortness of breath.    Cardiovascular: Negative.    Gastrointestinal:  Positive for diarrhea.   Endocrine: Negative.    Genitourinary: Negative.    Musculoskeletal: Negative.    Skin: Negative.    Allergic/Immunologic: Negative.    Neurological: Negative.    Psychiatric/Behavioral: Negative.      Objective:     Vital Signs (Most Recent):  Temp: 98.4 °F (36.9 °C) (03/21/23 2016)  Pulse: 103 (03/21/23 2016)  Resp: 18 (03/21/23 2016)  BP: 134/84 (03/21/23 2016)  SpO2: 96 % (03/21/23 2016)   Vital Signs (24h Range):  Temp:  [97.4 °F (36.3 °C)-98.9 °F (37.2 °C)] 98.4 °F (36.9 °C)  Pulse:  [] 103  Resp:  [15-25] 18  SpO2:  [80 %-100 %] 96 %  BP: (100-134)/(54-85) 134/84     Weight: 80 kg (176 lb 5.9 oz)  Body mass index is 25.31 kg/m².    Physical Exam  Vitals and nursing note reviewed.   Constitutional:       General: He is in acute distress.      Appearance: He is ill-appearing.   HENT:      Head: Normocephalic and atraumatic.      Nose: Nose normal.      Mouth/Throat:      Mouth: Mucous membranes are moist.   Eyes:      Extraocular Movements: Extraocular movements intact.   Cardiovascular:      Rate and Rhythm: Tachycardia present.      Pulses: Normal pulses.      Heart sounds: No murmur heard.  Pulmonary:      Effort: Pulmonary effort is normal.      Breath sounds: Normal breath sounds.   Abdominal:      General: Abdomen is flat.      Palpations: Abdomen is soft.      Tenderness: There is no abdominal tenderness.   Musculoskeletal:      Right lower leg: No edema.      Left lower leg: No edema.      Comments: Left upper extremity swelling   Skin:     General: Skin is warm.      Capillary Refill: Capillary refill takes less than 2 seconds.      Comments: Palpable port in chest. No surrounding erythema or tenderness   Neurological:      General: No focal deficit present.      Mental Status: He is alert.   Psychiatric:         Mood and Affect: Mood normal.           Significant Labs: All pertinent labs within the past 24 hours have been reviewed.    Significant Imaging: I have reviewed all pertinent imaging results/findings within the past 24 hours.

## 2023-03-22 NOTE — SUBJECTIVE & OBJECTIVE
Interval history: s/p port removal and midline placement today. Tolerating abx. Getting breathing treatment post- procedure. Denies new complaints      Past Surgical History:   Procedure Laterality Date    BRONCHOSCOPY N/A 08/30/2019    Procedure: Bronchoscopy;  Surgeon: [facility] Diagnostic Provider;  Location: [facility] OR [address] [place] [address];  Service: Anesthesiology;  Laterality: N/A;    CORONARY ANGIOGRAPHY N/A 03/04/2022    Procedure: ANGIOGRAM, CORONARY ARTERY;  Surgeon: Robson Green MD;  Location: [facility] CATH LAB;  Service: Cardiology;  Laterality: N/A;    INSERTION OF TUNNELED CENTRAL VENOUS CATHETER (CVC) WITH SUBCUTANEOUS PORT         Review of patient's allergies indicates:  No Known Allergies    Current Facility-Administered Medications on File Prior to Encounter   Medication    heparin, porcine (PF) 100 unit/mL injection flush 500 Units    sodium chloride 0.9% flush 10 mL     Current Outpatient Medications on File Prior to Encounter   Medication Sig    albuterol (VENTOLIN HFA) 90 mcg/actuation inhaler Inhale 2 puffs into the lungs every 6 (six) hours as needed for Wheezing. Rescue    albuterol-ipratropium (DUO-NEB) 2.5 mg-0.5 mg/3 mL nebulizer solution Inhale contents of 1 vial (3 mLs) by nebulization every 4 (four) hours as needed for Wheezing or Shortness of Breath. Rescue    amLODIPine (NORVASC) 10 MG tablet Take 1 tablet (10 mg total) by mouth once daily.    apixaban (ELIQUIS) 5 mg Tab Take 1 tablet (5 mg total) by mouth 2 (two) times daily.    ascorbic acid-multivit-min (VITAMIN C ENERGY BOOSTER) 1,000 mg PwEP Take 3,000 mg by mouth once daily. Patient takes 3,000 mg per day    aspirin 81 MG Chew Take 1 tablet (81 mg total) by mouth once daily.    atorvastatin (LIPITOR) 80 MG tablet Take 1 tablet (80 mg total) by mouth once daily.    cilostazoL (PLETAL) 100 MG Tab Take 1 tablet (100 mg total) by mouth 2 (two) times daily.    furosemide (LASIX) 40 MG tablet Take 1 tablet (40 mg total) by mouth 2 (two) times a day.     metoprolol tartrate (LOPRESSOR) 50 MG tablet Take 1 tablet (50 mg total) by mouth 3 (three) times daily.    oxyCODONE (ROXICODONE) 15 MG Tab TAKE 1 PO AT NIGHT PRN PAIN AND 1/3 PILL DAILY PRN PAIN    polyethylene glycol (GLYCOLAX) 17 gram/dose powder Mix 1 capful (17 grams total) with a liquid and take by mouth once daily.    tiotropium (SPIRIVA) 18 mcg inhalation capsule Inhale 1 capsule (18 mcg total) into the lungs via handihaler once daily. Controller     Family History       Problem Relation (Age of Onset)    Cancer Father, Sister    Diabetes Mother    Heart defect Mother    No Known Problems Son          Tobacco Use    Smoking status: Former     Packs/day: 1.00     Years: 25.00     Pack years: 25.00     Types: Cigarettes     Quit date: 2020     Years since quitting: 3.2    Smokeless tobacco: Never   Substance and Sexual Activity    Alcohol use: Yes     Comment: 11/3/22: Occ.    Drug use: Never    Sexual activity: Yes     Partners: Female     Review of Systems   Constitutional:  Positive for appetite change.   HENT: Negative.     Eyes: Negative.    Respiratory:  Positive for shortness of breath.    Cardiovascular: Negative.    Gastrointestinal:  Positive for diarrhea.   Endocrine: Negative.    Genitourinary: Negative.    Musculoskeletal: Negative.    Skin: Negative.    Allergic/Immunologic: Negative.    Neurological: Negative.    Psychiatric/Behavioral: Negative.     Objective:     Vital Signs (Most Recent):  Temp: 98.4 °F (36.9 °C) (03/21/23 2016)  Pulse: 103 (03/21/23 2016)  Resp: 18 (03/21/23 2016)  BP: 134/84 (03/21/23 2016)  SpO2: 96 % (03/21/23 2016)   Vital Signs (24h Range):  Temp:  [97.4 °F (36.3 °C)-98.9 °F (37.2 °C)] 98.4 °F (36.9 °C)  Pulse:  [] 103  Resp:  [15-25] 18  SpO2:  [80 %-100 %] 96 %  BP: (100-134)/(54-85) 134/84     Weight: 80 kg (176 lb 5.9 oz)  Body mass index is 25.31 kg/m².    Physical Exam  Vitals and nursing note reviewed.   Constitutional:       General: He is in acute  distress.      Appearance: He is ill-appearing.   HENT:      Head: Normocephalic and atraumatic.      Nose: Nose normal.      Mouth/Throat:      Mouth: Mucous membranes are moist.   Eyes:      Extraocular Movements: Extraocular movements intact.   Cardiovascular:      Rate and Rhythm: Tachycardia present.      Pulses: Normal pulses.      Heart sounds: No murmur heard.  Pulmonary:      Effort: Pulmonary effort is normal.      Breath sounds: Normal breath sounds.   Abdominal:      General: Abdomen is flat.      Palpations: Abdomen is soft.      Tenderness: There is no abdominal tenderness.   Musculoskeletal:      Right lower leg: No edema.      Left lower leg: No edema.      Comments: Left upper extremity swelling   Skin:     General: Skin is warm.      Capillary Refill: Capillary refill takes less than 2 seconds.      Comments: Palpable port in chest. No surrounding erythema or tenderness   Neurological:      General: No focal deficit present.      Mental Status: He is alert.   Psychiatric:         Mood and Affect: Mood normal.           Significant Labs: All pertinent labs within the past 24 hours have been reviewed.    Significant Imaging: I have reviewed all pertinent imaging results/findings within the past 24 hours.

## 2023-03-22 NOTE — SUBJECTIVE & OBJECTIVE
Interval History: He underwent port removal yesterday. Did well overnight. Pain is well-controlled.     Medications:  Continuous Infusions:  Scheduled Meds:   albuterol-ipratropium  3 mL Nebulization Q6H WAKE    amLODIPine  10 mg Oral Daily    apixaban  5 mg Oral BID    aspirin  81 mg Oral Daily    atorvastatin  80 mg Oral Daily    ceFEPime (MAXIPIME) IVPB  2 g Intravenous Q8H    cilostazoL  100 mg Oral BID    famotidine (PF)  20 mg Intravenous Daily    furosemide  40 mg Oral BID    guaiFENesin  600 mg Oral BID    metoprolol tartrate  50 mg Oral TID     PRN Meds:acetaminophen, albuterol-ipratropium, benzonatate, dextromethorphan-guaiFENesin  mg/5 ml, melatonin, ondansetron, oxyCODONE, prochlorperazine, sodium chloride 0.9%     Review of patient's allergies indicates:  No Known Allergies  Objective:     Vital Signs (Most Recent):  Temp: 98.3 °F (36.8 °C) (03/22/23 0719)  Pulse: 84 (03/22/23 0752)  Resp: 18 (03/22/23 0752)  BP: (!) 113/57 (03/22/23 0719)  SpO2: (!) 93 % (03/22/23 0752)   Vital Signs (24h Range):  Temp:  [97.4 °F (36.3 °C)-98.9 °F (37.2 °C)] 98.3 °F (36.8 °C)  Pulse:  [] 84  Resp:  [15-25] 18  SpO2:  [80 %-100 %] 93 %  BP: (107-134)/(57-84) 113/57     Weight: 80 kg (176 lb 5.9 oz)  Body mass index is 25.31 kg/m².    Intake/Output - Last 3 Shifts         03/20 0700  03/21 0659 03/21 0700  03/22 0659 03/22 0700  03/23 0659    P.O. 840 120 120    IV Piggyback  300     Total Intake(mL/kg) 840 (10.5) 420 (5.3) 120 (1.5)    Urine (mL/kg/hr) 1800 (0.9) 600 (0.3) 300 (1.2)    Total Output 1800 600 300    Net -960 -180 -180                   Physical Exam  Vitals reviewed.   Constitutional:       General: He is not in acute distress.     Appearance: He is not ill-appearing.   HENT:      Head: Normocephalic and atraumatic.   Cardiovascular:      Rate and Rhythm: Normal rate and regular rhythm.      Comments: R IJV port, no erythema or purulence at port pocket  Pulmonary:      Comments: R chest port  site incision intact, no swelling or hematoma  Abdominal:      General: There is no distension.   Musculoskeletal:         General: No deformity.   Skin:     General: Skin is warm and dry.   Neurological:      Mental Status: He is alert. He is disoriented.      Comments: Disoriented to place and time       Significant Labs:  I have reviewed all pertinent lab results within the past 24 hours.  CBC:   Recent Labs   Lab 03/21/23  0601   WBC 9.35   RBC 4.73   HGB 12.8*   HCT 41.0      MCV 87   MCH 27.1   MCHC 31.2*       CMP:   Recent Labs   Lab 03/18/23  0321 03/19/23  0339 03/22/23  0341   *   < > 106   CALCIUM 8.2*   < > 8.5*   ALBUMIN 2.4*  --   --    PROT 5.6*  --   --       < > 138   K 3.2*   < > 3.9   CO2 28   < > 28      < > 105   BUN 11   < > 10   CREATININE 0.8   < > 0.8   ALKPHOS 84  --   --    ALT 15  --   --    AST 19  --   --    BILITOT 0.7  --   --     < > = values in this interval not displayed.         Significant Diagnostics:  I have reviewed all pertinent imaging results/findings within the past 24 hours.

## 2023-03-22 NOTE — PROGRESS NOTES
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     Patient presented with severe respiratory distress that started shortly prior to presentation.  Per his wife, he started having worsening shortness of breath despite receiving DuoNebs and she noted that he was choking.  Despite being on 4 oxygen at home, he was hypoxic to 65%.  Additional symptoms include decreased p.o. intake and diarrhea which improved.    In the ED, the patient was tachycardic (up to 160), tachypneic and hypoxic requiring BiPAP.  Labs were remarkable for leukocytosis (32.7), elevated D-dimer (> 33.0), elevated BNP (272), elevated troponin (0.045), elevated lactic acid (6.7) and positive influenza A.  Chest x-ray showed persistent bilateral airspace and pleural opacities, slightly improved in the left upper lung since prior exam.  Left upper extremity ultrasound showed no thrombus in central veins.  CTA chest showed no PE, interval enlargement of known left supraclavicular mass/adenopathy, new masslike consolidation with air bronchograms in the right middle lobe, likely pneumonic process and new right hilar node measuring 26 x 22 mm..  He was given DuoNebs x3, 1.9 L of LR, 500 mL of NS, vancomycin, Zosyn, and was admitted for further management.          Overview/Hospital Course:  Mr Kashif Iverson was admitted with acute on chronic hypoxic respiratory  failure secondary to pneumonia and influenza A. Started antibitoics and tamiflu. Started vapotherm. Pulmonary following. He has had some confusion; CTH no acute changes. Improved respiratory status. Weaned to 5L NC. Blood cultures from admit with Acinetobacter bacteremia which has cleared. ID consulted. Stepped down to floor.  PT/OT consulted and rec: H/H on discharge       Interval History:  No new issues     Review of Systems   Constitutional:  Positive for activity change.   HENT:  Negative for congestion.    Respiratory:  Negative for chest tightness and shortness of breath.    Genitourinary:  Negative for difficulty urinating.   Objective:     Vital Signs (Most Recent):  Temp: 98.3 °F (36.8 °C) (03/22/23 0719)  Pulse: 84 (03/22/23 0752)  Resp: 18 (03/22/23 0752)  BP: (!) 113/57 (03/22/23 0719)  SpO2: (!) 93 % (03/22/23 0752)   Vital Signs (24h Range):  Temp:  [97.4 °F (36.3 °C)-98.9 °F (37.2 °C)] 98.3 °F (36.8 °C)  Pulse:  [] 84  Resp:  [15-25] 18  SpO2:  [80 %-100 %] 93 %  BP: (107-134)/(57-84) 113/57     Weight: 80 kg (176 lb 5.9 oz)  Body mass index is 25.31 kg/m².    Intake/Output Summary (Last 24 hours) at 3/22/2023 1054  Last data filed at 3/22/2023 0900  Gross per 24 hour   Intake 420 ml   Output 900 ml   Net -480 ml      Physical Exam  Vitals and nursing note reviewed.   Constitutional:       Appearance: Normal appearance.   Pulmonary:      Effort: Pulmonary effort is normal. No respiratory distress.   Neurological:      Mental Status: He is alert and oriented to person, place, and time.       Significant Labs: All pertinent labs within the past 24 hours have been reviewed.  BMP:   Recent Labs   Lab 03/22/23  0341         K 3.9      CO2 28   BUN 10   CREATININE 0.8   CALCIUM 8.5*   MG 1.9     CBC:   Recent Labs   Lab 03/21/23  0601   WBC 9.35   HGB 12.8*   HCT 41.0          Significant Imaging:       Assessment/Plan:      * Acute on chronic respiratory failure with  hypoxia  -Admitted to inpatient status  -Patient admitted with Hypoxic which is Acute on Chronic.   -He is on home oxygen at 4 LPM.   -Signs/symptoms of respiratory failure included- tachypnea, increased work of breathing, respiratory distress, use of accessory muscles and wheezing present on admission.   -Labs and images were reviewed. Patient Has not has a recent ABG.   -Supplemental oxygen was provided and noted  -Respiratory failure is due to- Pneumonia, influenza, COPD and lung cancer  -Treated in ICU with vapotherm, antiviral, antibiotics and nebulizers.  Weaned to nasal canula and stepped down to the floor on 3/19  -Completes tamiflu today  -Continue antibiotics for bacteremia and possible bacterial pneumonia as below  -Currently on 5L NC O2 - wean as able.    -Add incentive spirometry, tessalon and guaifenesin    Continue Abx.  Home likely on 3/23.    Pneumonia  -Influenza A + suspected bacterial pneumonia as well.   -Cough has been dry and have been unable to collect sputum culture.   -Blood cultures with Acinetobacter  -Treatment as above for Sepsis    Acinetobacter lwoffi Bacteremia  -Treatment as above.    Prolonged Q-T interval on ECG  -Noted- monitor     Influenza A  -Completes tamiflu day 5 today.    Advanced care planning/counseling discussion  Advance Care Planning  Date: 03/16/2023 Code Status Dr Marroquin discussed the patient's code status with the patient and his wife.  He wants to remain full code at this time.  Dr Marroquin spent a total of 2 minutes engaging the patient in this advance care planning discussion.  -I attempted to engage patient in goals of care discussion today but he quickly stated he was not interested in discussing this  -Palliative care consulted and input appreciated.  Fortunately their skilled discussions have been more productive.  Patient's wife is considering hospice care at discharge.      Persistent atrial fibrillation  -[id number] Score: 3   -Continue home metoprolol and  Eliquis  -Telemetry monitoring    COPD (chronic obstructive pulmonary disease)  -No signs of current exacerbation  -Pulmonology recommends starting triple therapy inhaler (Breztri or Trelegy) at discharge after he recovers from his pneumonia  -Continue nebs    Severe sepsis  -Patient admitted with severe sepsis  -Sepsis was due to influenza, possible secondary superimposed bacterial pneumonia of RML and Acinetobacter bacteremia.  -Completes day 5 of tamiflu today  -Blood cultures 3/15 grew Acinetobacter sensitive to cefepime.    -Repeat blood cultures 3/16 negative  -Source of bacteremia unclear - ?due to his port?  -Has been treated with vanc and zosyn x 5 days.  Will stop these and start cefepime.  -Consult general surgery for evaluation of port removal (no longer needs port as not a candidate for further chemo)  -Consult ID for antibiotic recommendations.    Cefepime through 3/28.    Coronary artery disease involving native coronary artery of native heart without angina pectoris  -No active chest pain or signs of ACS.   -Continue asa, statin and metoprolol    PAD (peripheral artery disease)  -No signs of lower extremity ulceration/wound  -Continue asa, statin     Acute encephalopathy  -At times he seems somewhat confused and removes his oxygen and intermittently does not remember the status of his lung cancer.   -Oriented to self and location but can't remember why he is in the hospital. He does have history of strokes.   -Other care providers state he has been confused like this on past admissions.   -Unclear if this is baseline or hospitalization or infection related  -CTH no acute changes.   -Improving  -Ordered delirium precautions      Chronic diastolic heart failure  -This is chronic diastolic chf  -Note: previously did have mixed systolic/diastolic CHF with EF down to 35% (2/2022). EF has recovered.   -Echo 9/22 showed EF 60%, normal diastolic function and pasp 40mmHg  - on admit  -Strict ins/outs  and daily weights  -Continue PO lasix and metoprolol.    -BP is soft and not much room to start ACEi/ARB or entresto at this time    Hypokalemia  -K normal today  -Repeat BMP in AM    Essential hypertension  -BP currently well controlled - SBP in low 100s  -Continue home metoprolol and amlodipine     Lung cancer, main bronchus, left  -He follows with Oncology.   -He was previously receiving palliative chemotherapy.   -Per most recently Oncology notes, he is no longer a candidate for chemotherapy and hospice was recommended  -Consult general surgery for evaluation of port removal  -Palliative care on board and are actively discussing possible hospice care at discharge.    History of completed stroke  -No new neurologic deficit on exam but somewhat confused at times.   -CTH without any acute changes  -Continue asa, eliquis, statin     Debility- H/H on discharge    VTE Risk Mitigation (From admission, onward)         Ordered     apixaban tablet 5 mg  2 times daily         03/16/23 0221     IP VTE HIGH RISK PATIENT  Once         03/16/23 0221     Place sequential compression device  Until discontinued         03/16/23 0221                Discharge Planning   AZ: 3/22/2023     Code Status: Full Code   Is the patient medically ready for discharge?:     Reason for patient still in hospital (select all that apply): Patient unstable  Discharge Plan A: Home with family, Home Health   Discharge Delays: None known at this time    Likely home on 3/23           Carroll Syed MD  Department of Hospital Medicine   [facility] - [place] - Telemetry

## 2023-03-22 NOTE — PT/OT/SLP PROGRESS
"Occupational Therapy   Treatment    Name: Kashif Iverson  MRN: [id number]  Admitting Diagnosis:  Acute on chronic respiratory failure with hypoxia  1 Day Post-Op    Recommendations:     Discharge Recommendations: home health OT (w/ supervision/family assistance)  Discharge Equipment Recommendations:  none  Barriers to discharge:  None    Assessment:     Kashif Iverson is a 62 y.o. male with a medical diagnosis of Acute on chronic respiratory failure with hypoxia.  Performance deficits affecting function are weakness, impaired endurance, impaired cardiopulmonary response to activity.     Pt appeared somewhat agitated this date but was able to participate in OOB activities. Pt was able to ambulate functional distance in room for performing standing ADLs w/ SBA and no AD. Pt w/ SPO2 89% on 4L O2 NC, denying SOB throughout. Pt will continue to benefit from skilled acute OT services to maximize functional capacity for safe performance w/ ADLs and functional mobility.     Rehab Prognosis:  Good; patient would benefit from acute skilled OT services to address these deficits and reach maximum level of function.       Plan:     Patient to be seen 2 x/week to address the above listed problems via self-care/home management, therapeutic activities, therapeutic exercises  Plan of Care Expires: 04/03/23  Plan of Care Reviewed with: patient    Subjective     Chief Complaint: Agitated when asked if he was dizzy; "Are you dizzy?"  Patient/Family Comments/goals: None stated   Pain/Comfort:  Pain Rating 1: 0/10  Pain Rating Post-Intervention 1: 0/10    Objective:     Communicated with: Nurse prior to session.  Patient found HOB elevated with telemetry, peripheral IV, oxygen, pulse ox (continuous) upon OT entry to room.    General Precautions: Standard, fall, respiratory    Orthopedic Precautions:N/A  Braces: N/A  Respiratory Status: Nasal cannula, flow 4 L/min     Occupational Performance:     Bed Mobility:    Patient completed " Scooting/Bridging with supervision  Patient completed Supine to Sit with supervision     Functional Mobility/Transfers:  Patient completed Sit <> Stand Transfer with stand by assistance  with  no assistive device   Functional Mobility: Pt was able to ambulate from EOB>sink and returned to EOB w/ SBA and no AD.     Activities of Daily Living:  Grooming: supervision and stand by assistance for performing standing oral care  Upper Body Dressing: minimum assistance for donning gown over back       [facility] 6 Click ADL: 24    Treatment & Education:  -Pt performed ADLs and functional mobility as noted above.   -Pt educated on safe functional mobility and ADL performance.   -Pt educated on importance of PLB and energy conservation.     Patient left sitting edge of bed with all lines intact, call button in reach, and nurse notified    GOALS:   Multidisciplinary Problems       Occupational Therapy Goals          Problem: Occupational Therapy    Goal Priority Disciplines Outcome Interventions   Occupational Therapy Goal     OT, PT/OT Ongoing, Progressing    Description: Goals to be met by: 4/3/23     Patient will increase functional independence with ADLs by performing:    Grooming while seated with Modified [place].  Toileting from bedside commode with Minimal Assistance for hygiene and clothing management.   Step transfer with Modified [place]  Toilet transfer to bedside commode with Modified [place].  Upper extremity exercise program x15 reps per handout, with independence.  Implementation of PLB and energy conservation strategies into daily activities w/ min cueing.                          Time Tracking:     OT Date of Treatment: 03/22/23  OT Start Time: 1455  OT Stop Time: 1507  OT Total Time (min): 12 min    Billable Minutes:Self Care/Home Management 12  Total Time 12    OT/PURVI: OT          3/22/2023

## 2023-03-22 NOTE — ASSESSMENT & PLAN NOTE
63 yo male with complex medical history who is admitted for acute on chronic respiratory failure, + blood cultures  S/p port removal on 3/21    - Port site incision intact, please notify if any concerns arise with this site  - Can resume Eliquis tomorrow morning   - His oncologist confirmed that port is no longer needed  - Advance diet as tolerated  - Please call with questions or concerns

## 2023-03-22 NOTE — ASSESSMENT & PLAN NOTE
-Admitted to inpatient status  -Patient admitted with Hypoxic which is Acute on Chronic.   -He is on home oxygen at 4 LPM.   -Signs/symptoms of respiratory failure included- tachypnea, increased work of breathing, respiratory distress, use of accessory muscles and wheezing present on admission.   -Labs and images were reviewed. Patient Has not has a recent ABG.   -Supplemental oxygen was provided and noted  -Respiratory failure is due to- Pneumonia, influenza, COPD and lung cancer  -Treated in ICU with vapotherm, antiviral, antibiotics and nebulizers.  Weaned to nasal canula and stepped down to the floor on 3/19  -Completes tamiflu today  -Continue antibiotics for bacteremia and possible bacterial pneumonia as below  -Currently on 5L NC O2 - wean as able.    -Add incentive spirometry, tessalon and guaifenesin    Continue Abx.  Home likely on 3/23.

## 2023-03-22 NOTE — SUBJECTIVE & OBJECTIVE
Interval History:  No new issues     Review of Systems   Constitutional:  Positive for activity change.   HENT:  Negative for congestion.    Respiratory:  Negative for chest tightness and shortness of breath.    Genitourinary:  Negative for difficulty urinating.   Objective:     Vital Signs (Most Recent):  Temp: 98.3 °F (36.8 °C) (03/22/23 0719)  Pulse: 84 (03/22/23 0752)  Resp: 18 (03/22/23 0752)  BP: (!) 113/57 (03/22/23 0719)  SpO2: (!) 93 % (03/22/23 0752)   Vital Signs (24h Range):  Temp:  [97.4 °F (36.3 °C)-98.9 °F (37.2 °C)] 98.3 °F (36.8 °C)  Pulse:  [] 84  Resp:  [15-25] 18  SpO2:  [80 %-100 %] 93 %  BP: (107-134)/(57-84) 113/57     Weight: 80 kg (176 lb 5.9 oz)  Body mass index is 25.31 kg/m².    Intake/Output Summary (Last 24 hours) at 3/22/2023 1054  Last data filed at 3/22/2023 0900  Gross per 24 hour   Intake 420 ml   Output 900 ml   Net -480 ml      Physical Exam  Vitals and nursing note reviewed.   Constitutional:       Appearance: Normal appearance.   Pulmonary:      Effort: Pulmonary effort is normal. No respiratory distress.   Neurological:      Mental Status: He is alert and oriented to person, place, and time.       Significant Labs: All pertinent labs within the past 24 hours have been reviewed.  BMP:   Recent Labs   Lab 03/22/23  0341         K 3.9      CO2 28   BUN 10   CREATININE 0.8   CALCIUM 8.5*   MG 1.9     CBC:   Recent Labs   Lab 03/21/23  0601   WBC 9.35   HGB 12.8*   HCT 41.0          Significant Imaging:

## 2023-03-22 NOTE — PLAN OF CARE
Problem: Occupational Therapy  Goal: Occupational Therapy Goal  Description: Goals to be met by: 4/3/23     Patient will increase functional independence with ADLs by performing:    Grooming while seated with Modified [place].  Toileting from bedside commode with Minimal Assistance for hygiene and clothing management.   Step transfer with Modified [place]  Toilet transfer to bedside commode with Modified [place].  Upper extremity exercise program x15 reps per handout, with independence.  Implementation of PLB and energy conservation strategies into daily activities w/ min cueing.     Outcome: Ongoing, Progressing

## 2023-03-22 NOTE — PLAN OF CARE
Problem: Physical Therapy  Goal: Physical Therapy Goal  Description: Goals to be met by: 4/3/23     Patient will increase functional independence with mobility by performin. Supine to sit with Modified [place]  2. Rolling to Left and Right with Modified [place]  3. Sit to stand transfer with Modified [place]  4. Bed to chair transfer with Modified [place]   5. Gait x50 feet with Modified [place] using O2  6. Lower extremity exercise program 2 sets x10 reps per handout, with independence    Outcome: Ongoing, Progressing

## 2023-03-22 NOTE — PT/OT/SLP PROGRESS
Physical Therapy Treatment    Patient Name:  Kashif Iverson   MRN:  [id number]    Recommendations:     Discharge Recommendations: home health PT (with family assistance)  Discharge Equipment Recommendations: none  Barriers to discharge: None    Assessment:     Kashif Iverson is a 62 y.o. male admitted with a medical diagnosis of Acute on chronic respiratory failure with hypoxia.  He presents with the following impairments/functional limitations: weakness, impaired endurance, gait instability, impaired self care skills, pain, impaired functional mobility, decreased safety awareness, impaired cardiopulmonary response to activity . .      Rehab Prognosis: Good; patient would benefit from acute skilled PT services to address these deficits and reach maximum level of function.    Recent Surgery: Procedure(s) (LRB):  REMOVAL, CATHETER, CENTRAL VENOUS, TUNNELED, WITH PORT (Right) 1 Day Post-Op    Plan:     During this hospitalization, patient to be seen 3 x/week to address the identified rehab impairments via gait training, therapeutic activities, therapeutic exercises and progress toward the following goals:    Plan of Care Expires:  04/03/23    Subjective     Chief Complaint: none  Patient/Family Comments/goals: pt is agreeable to therapy with encouragement. Pt is confused and gets agitated easily .   Pain/Comfort:  Pain Rating 1: 0/10  Pain Rating Post-Intervention 1: 0/10      Objective:     Communicated with nurse Gautam  prior to session.  Patient found HOB elevated with telemetry, peripheral IV, oxygen upon PT entry to room.     General Precautions: Standard, fall, respiratory  Orthopedic Precautions: N/A  Braces: N/A  Respiratory Status: High flow, flow 4 L/min  SpO2 : 87%-98% on 4L , pt required rest break and pursed lip breathing technique     Functional Mobility:  Bed Mobility:     Rolling Right: modified independence  Scooting: modified independence  Supine to Sit: modified independence  Transfers:     Sit to Stand:  x 2 trials from bed modified independence with no AD and rolling walker  Gait: pt ambulated 20 ft with no AD, SBA(4L O2NC). Noted with decreased juan,decreased step length,no LOB. Pt declined further therapy activity, increased agitation , nurse notified.    Balance: good       AM-PAC 6 CLICK MOBILITY  Turning over in bed (including adjusting bedclothes, sheets and blankets)?: 4  Sitting down on and standing up from a chair with arms (e.g., wheelchair, bedside commode, etc.): 4  Moving from lying on back to sitting on the side of the bed?: 4  Moving to and from a bed to a chair (including a wheelchair)?: 4  Need to walk in hospital room?: 3  Climbing 3-5 steps with a railing?: 3  Basic Mobility Total Score: 22       Treatment & Education:  Pt performed bed mobility, transfer and gait training as above.     Patient left sitting edge of bed with all lines intact, call button in reach, and nurse Gautam  notified..    GOALS:   Multidisciplinary Problems       Physical Therapy Goals          Problem: Physical Therapy    Goal Priority Disciplines Outcome Goal Variances Interventions   Physical Therapy Goal     PT, PT/OT Ongoing, Progressing     Description: Goals to be met by: 4/3/23     Patient will increase functional independence with mobility by performin. Supine to sit with Modified [place]  2. Rolling to Left and Right with Modified [place]  3. Sit to stand transfer with Modified [place]  4. Bed to chair transfer with Modified [place]   5. Gait x50 feet with Modified [place] using O2  6. Lower extremity exercise program 2 sets x10 reps per handout, with independence                         Time Tracking:     PT Received On: 23  PT Start Time: 1455     PT Stop Time: 1507  PT Total Time (min): 12 min     Billable Minutes: Gait Training 12 and Total Time 12 min with OT     Treatment Type: Treatment  PT/PTA: PTA     Number of PTA visits since last PT visit: 2023

## 2023-03-23 NOTE — PLAN OF CARE
Problem: Adult Inpatient Plan of Care  Goal: Plan of Care Review  Outcome: Met  Goal: Patient-Specific Goal (Individualized)  Outcome: Met  Goal: Absence of Hospital-Acquired Illness or Injury  Outcome: Met  Goal: Optimal Comfort and Wellbeing  Outcome: Met  Goal: Readiness for Transition of Care  Outcome: Met     Problem: Fluid Imbalance (Pneumonia)  Goal: Fluid Balance  Outcome: Met     Problem: Infection (Pneumonia)  Goal: Resolution of Infection Signs and Symptoms  Outcome: Met     Problem: Respiratory Compromise (Pneumonia)  Goal: Effective Oxygenation and Ventilation  Outcome: Met     Problem: Infection  Goal: Absence of Infection Signs and Symptoms  Outcome: Met     Problem: Skin Injury Risk Increased  Goal: Skin Health and Integrity  Outcome: Met     Problem: Coping Ineffective  Goal: Effective Coping  Outcome: Met     Problem: Adjustment to Illness (Sepsis/Septic Shock)  Goal: Optimal Coping  Outcome: Met     Problem: Bleeding (Sepsis/Septic Shock)  Goal: Absence of Bleeding  Outcome: Met     Problem: Glycemic Control Impaired (Sepsis/Septic Shock)  Goal: Blood Glucose Level Within Desired Range  Outcome: Met     Problem: Infection Progression (Sepsis/Septic Shock)  Goal: Absence of Infection Signs and Symptoms  Outcome: Met     Problem: Nutrition Impaired (Sepsis/Septic Shock)  Goal: Optimal Nutrition Intake  Outcome: Met

## 2023-03-23 NOTE — PLAN OF CARE
Problem: Adult Inpatient Plan of Care  Goal: Plan of Care Review  Outcome: Ongoing, Progressing     Problem: Fluid Imbalance (Pneumonia)  Goal: Fluid Balance  Outcome: Ongoing, Progressing     Problem: Infection (Pneumonia)  Goal: Resolution of Infection Signs and Symptoms  Outcome: Ongoing, Progressing     Problem: Respiratory Compromise (Pneumonia)  Goal: Effective Oxygenation and Ventilation  Outcome: Ongoing, Progressing     Problem: Infection  Goal: Absence of Infection Signs and Symptoms  Outcome: Ongoing, Progressing     Problem: Skin Injury Risk Increased  Goal: Skin Health and Integrity  Outcome: Ongoing, Progressing     Problem: Coping Ineffective  Goal: Effective Coping  Outcome: Ongoing, Progressing     Problem: Adjustment to Illness (Sepsis/Septic Shock)  Goal: Optimal Coping  Outcome: Ongoing, Progressing     Problem: Bleeding (Sepsis/Septic Shock)  Goal: Absence of Bleeding  Outcome: Ongoing, Progressing     Problem: Glycemic Control Impaired (Sepsis/Septic Shock)  Goal: Blood Glucose Level Within Desired Range  Outcome: Ongoing, Progressing     Problem: Infection Progression (Sepsis/Septic Shock)  Goal: Absence of Infection Signs and Symptoms  Outcome: Ongoing, Progressing     Problem: Nutrition Impaired (Sepsis/Septic Shock)  Goal: Optimal Nutrition Intake  Outcome: Ongoing, Progressing

## 2023-03-23 NOTE — DISCHARGE SUMMARY
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     Patient presented with severe respiratory distress that started shortly prior to presentation.  Per his wife, he started having worsening shortness of breath despite receiving DuoNebs and she noted that he was choking.  Despite being on 4 oxygen at home, he was hypoxic to 65%.  Additional symptoms include decreased p.o. intake and diarrhea which improved.    In the ED, the patient was tachycardic (up to 160), tachypneic and hypoxic requiring BiPAP.  Labs were remarkable for leukocytosis (32.7), elevated D-dimer (> 33.0), elevated BNP (272), elevated troponin (0.045), elevated lactic acid (6.7) and positive influenza A.  Chest x-ray showed persistent bilateral airspace and pleural opacities, slightly improved in the left upper lung since prior exam.  Left upper extremity ultrasound showed no thrombus in central veins.  CTA chest showed no PE, interval enlargement of known left supraclavicular mass/adenopathy, new masslike consolidation with air bronchograms in the right middle lobe, likely pneumonic process and new right hilar node measuring 26 x 22 mm..  He was given DuoNebs x3, 1.9 L of LR, 500 mL of NS, vancomycin, Zosyn, and was admitted for further management.          Procedure(s)  (LRB):  REMOVAL, CATHETER, CENTRAL VENOUS, TUNNELED, WITH PORT (Right)      Hospital Course:   Mr Kashif Iverson was admitted with acute on chronic hypoxic respiratory failure secondary to pneumonia and influenza A. Started antibitoics and tamiflu. Started vapotherm. Pulmonary following. He has had some confusion; CTH no acute changes. Improved respiratory status. Weaned to 5L NC. Blood cultures from admit with Acinetobacter bacteremia which has cleared. ID consulted. Stepped down to floor.  PT/OT consulted and rec: H/H on discharge. ID recs Cefepime through 3/28. Patient already on 4L of 02 at home.  Activity as tolerated. Diet- low NA. Follow up PCP in one week. Patient discharged to LTAC.       Goals of Care Treatment Preferences:  Code Status: Full Code          What is most important right now is to focus on spending time at home, avoiding the hospital, remaining as independent as possible, symptom/pain control, quality of life, even if it means sacrificing a little time.  Accordingly, we have decided that the best plan to meet the patient's goals includes enrolling in hospice care.      Consults:   Consults (From admission, onward)          Status Ordering Provider     Inpatient consult to Midline team  Once        Provider:  (Not yet assigned)    Completed JAMIE GUERRERO     Inpatient consult to General Surgery  Once        Provider:  Rayshawn Klein MD    Completed JAMIE GUERRERO     Inpatient consult to Infectious Diseases  Once        Provider:  Dania Kelly MD    Completed KASANDRA GANDHI     Inpatient consult to Palliative Care  Once        Provider:  Eli Arango NP    Completed KASANDRA GANDHI     Inpatient consult to Spiritual Care  Once        Provider:  (Not yet assigned)    Completed KASANDRA GANDHI     Inpatient consult to Pulmonology  Once        Provider:  John Norton MD    Completed HAYDER PEREZ            No new Assessment & Plan notes have been filed under this  hospital service since the last note was generated.  Service: Hospital Medicine    Final Active Diagnoses:    Diagnosis Date Noted POA    PRINCIPAL PROBLEM:  Acute on chronic respiratory failure with hypoxia [J96.21] 09/05/2022 Yes    Pneumonia [J18.9] 03/17/2023 Yes    Influenza A [J10.1] 03/16/2023 Yes    Prolonged Q-T interval on ECG [R94.31] 03/16/2023 Yes    Acinetobacter lwoffi Bacteremia [A49.8] 03/16/2023 Yes    Advanced care planning/counseling discussion [Z71.89] 10/03/2022 Not Applicable    Debility [R53.81] 10/03/2022 Yes    Persistent atrial fibrillation [I48.19] 09/30/2022 Yes    COPD (chronic obstructive pulmonary disease) [J44.9] 09/05/2022 Yes    Severe sepsis [A41.9, R65.20] 05/17/2022 Yes    PAD (peripheral artery disease) [I73.9] 03/14/2022 Yes    Coronary artery disease involving native coronary artery of native heart without angina pectoris [I25.10] 03/14/2022 Yes     Chronic    Acute encephalopathy [G93.40]  Yes    Chronic diastolic heart failure [I50.32] 02/23/2022 Yes    Hypokalemia [E87.6] 02/22/2022 Yes    Essential hypertension [I10]  Yes     Chronic    Lung cancer, main bronchus, left [C34.02] 09/10/2019 Yes     Chronic    History of completed stroke [Z86.73] 09/03/2019 Not Applicable      Problems Resolved During this Admission:       Discharged Condition: good    Disposition: Home-Health Care [facility]    Follow Up:   Follow-up Information       Eduardo Ruiz MD Follow up in 1 week(s).    Specialty: Family Medicine  Contact information:  [address] [place] [address]  [phone]                           Patient Instructions:      Ambulatory referral/consult to Outpatient Case Management   Referral Priority: Routine Referral Type: Consultation   Referral Reason: Specialty Services Required   Number of Visits Requested: 1       Significant Diagnostic Studies:     Pending Diagnostic Studies:       Procedure Component Value Units Date/Time    Specimen to Pathology, Surgery  General Surgery [[id number]] Collected: 03/21/23 1336    Order Status: Sent Lab Status: In process Updated: 03/22/23 0708    Specimen: Tissue            Medications:  Reconciled Home Medications:      Medication List        START taking these medications      cefepime in dextrose 5 % 2 gram/50 mL Pgbk  Commonly known as: MAXIPIME  Inject 50 mLs (2 g total) into the vein every 8 (eight) hours.            CONTINUE taking these medications      albuterol 90 mcg/actuation inhaler  Commonly known as: VENTOLIN HFA  Inhale 2 puffs into the lungs every 6 (six) hours as needed for Wheezing. Rescue     albuterol-ipratropium 2.5 mg-0.5 mg/3 mL nebulizer solution  Commonly known as: DUO-NEB  Inhale contents of 1 vial (3 mLs) by nebulization every 4 (four) hours as needed for Wheezing or Shortness of Breath. Rescue     amLODIPine 10 MG tablet  Commonly known as: NORVASC  Take 1 tablet (10 mg total) by mouth once daily.     apixaban 5 mg Tab  Commonly known as: ELIQUIS  Take 1 tablet (5 mg total) by mouth 2 (two) times daily.     aspirin 81 MG Chew  Take 1 tablet (81 mg total) by mouth once daily.     atorvastatin 80 MG tablet  Commonly known as: LIPITOR  Take 1 tablet (80 mg total) by mouth once daily.     cilostazoL 100 MG Tab  Commonly known as: PLETAL  Take 1 tablet (100 mg total) by mouth 2 (two) times daily.     furosemide 40 MG tablet  Commonly known as: LASIX  Take 1 tablet (40 mg total) by mouth 2 (two) times a day.     GAVILAX 17 gram/dose powder  Generic drug: polyethylene glycol  Mix 1 capful (17 grams total) with a liquid and take by mouth once daily.     metoprolol tartrate 50 MG tablet  Commonly known as: LOPRESSOR  Take 1 tablet (50 mg total) by mouth 3 (three) times daily.     oxyCODONE 15 MG Tab  Commonly known as: ROXICODONE  TAKE 1 PO AT NIGHT PRN PAIN AND 1/3 PILL DAILY PRN PAIN     SPIRIVA WITH HANDIHALER 18 mcg inhalation capsule  Generic drug: tiotropium  Inhale 1 capsule (18 mcg total) into the lungs via  handihaler once daily. Controller     VITAMIN C ENERGY BOOSTER 1,000 mg Pwep  Generic drug: ascorbic acid-multivit-min  Take 3,000 mg by mouth once daily. Patient takes 3,000 mg per day              Indwelling Lines/Drains at time of discharge:   Lines/Drains/Airways       None                   Time spent on the discharge of patient: > 35  minutes         Carroll Syed MD  Department of Hospital Medicine  [facility]

## 2023-03-23 NOTE — PLAN OF CARE
"[facility] Telemetry      HOME HEALTH ORDERS  FACE TO FACE ENCOUNTER    Patient Name: Kashif Iverson  YOB: 1960    PCP: Eduardo Ruiz MD   PCP Address: [address] / [place] [address]  PCP Phone Number: [phone]  PCP Fax: [phone]    Encounter Date: 3/15/23    Admit to Home Health    Diagnoses: bacteremia/weakness    Active Hospital Problems    Diagnosis  POA    *Acute on chronic respiratory failure with hypoxia [J96.21]  Yes     Priority: 1 - High    Pneumonia [J18.9]  Yes    Influenza A [J10.1]  Yes    Prolonged Q-T interval on ECG [R94.31]  Yes    Acinetobacter lwoffi Bacteremia [A49.8]  Yes    Advanced care planning/counseling discussion [Z71.89]  Not Applicable    Debility [R53.81]  Yes    Persistent atrial fibrillation [I48.19]  Yes    COPD (chronic obstructive pulmonary disease) [J44.9]  Yes    Severe sepsis [A41.9, R65.20]  Yes    PAD (peripheral artery disease) [I73.9]  Yes     LUIS FELIPE 3/11/22      Coronary artery disease involving native coronary artery of native heart without angina pectoris [I25.10]  Yes     Chronic    Acute encephalopathy [G93.40]  Yes    Chronic diastolic heart failure [I50.32]  Yes    Hypokalemia [E87.6]  Yes    Essential hypertension [I10]  Yes     Chronic    Lung cancer, main bronchus, left [C34.02]  Yes     Chronic     8/30/2019 underwent bronchoscopy that showed "A partially obstructing (about 80% obstructed) mass was found in the middle portion in the left mainstem bronchus. The mass was large and bloody, circumferential, endobronchial, friable and necrotic. 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  No actionable mutations and PD-L1 5%.  9/19/21 staging PET CT showed "Hypermetabolic left lung mass concerning for primary pulmonary malignancy with metastatic disease involving the right 6th and 9th ribs as well as mediastinal and left supraclavicular lymph nodes."  Stage IV " squamous cell carcinoma of the lung at diagnosis.    10/8/2019-10/21/2019 he received palliative chemoradiation for rib pain with carboplatin and paclitaxel.  He received 3 cycles of carboplatin and paclitaxel.  He developed anaphylactic reaction to paclitaxel.  The chest was treated with AP:PA fields and the right 9th rib was treated with anterior and posterior oblique fields at 300 cGy per fraction to 3000 cGy.   Lt chest 6X 300 10 / 10 3,000   Rt 9th rib 6X 300 10 / 10 3,000     10/31/2019-9/10/2020 he received pembrolizumab (completed 15 cycles, last dose 8/21/2020)  9/10/2020 PET CT showed progression of disease.  He was then referred to Dr. Key for evaluation for clinical trials.  10/20/2020 he underwent repeat biopsy for LUNG MAP trial and was randomized to Ramucirumab + pembrolizumab.    11/17/2020 started ramucirumab+pembrolizumab.  Completed 4 cycles and then 2/10/2021 scans showed progression.    2/24/2021 he was subsequent started on gemcitabine with Dr. Cruz.      History of completed stroke [Z86.73]  Not Applicable       09/03/2019  On CT exam        Resolved Hospital Problems   No resolved problems to display.       Follow Up Appointments:  Future Appointments   Date Time Provider Department Center   3/27/2023  8:00 AM Angelita Smith NP [facility] C3HV [place]   3/29/2023 11:30 AM Dania Kelly MD [facility] ID [address]   3/31/2023  8:30 AM Guerline Higgins MD [facility] HEM ONC [place] [facility]   4/26/2023  9:00 AM Eduardo Ruiz MD [facility] MED [place]   5/19/2023 12:30 PM PRE-ADMIT, ENDO -[facility]OPP[facility]       Allergies:Review of patient's allergies indicates:  No Known Allergies    Medications: Review discharge medications with patient and family and provide education.    Current Facility-Administered Medications   Medication Dose Route Frequency Provider Last Rate Last Admin    acetaminophen tablet 650 mg  650 mg Oral Q4H PRN David Marroquin MD        albuterol-ipratropium 2.5  mg-0.5 mg/3 mL nebulizer solution 3 mL  3 mL Nebulization Q4H PRN David Marroquin MD        albuterol-ipratropium 2.5 mg-0.5 mg/3 mL nebulizer solution 3 mL  3 mL Nebulization Q6H [place] Carroll Ambriz MD   3 mL at 03/22/23 1924    amLODIPine tablet 10 mg  10 mg Oral Daily Jennifer Singleton MD   10 mg at 03/22/23 1051    apixaban tablet 5 mg  5 mg Oral BID David Marroquin MD   5 mg at 03/22/23 2044    aspirin chewable tablet 81 mg  81 mg Oral Daily David Marroquin MD   81 mg at 03/22/23 1050    atorvastatin tablet 80 mg  80 mg Oral Daily David Marroquin MD   80 mg at 03/22/23 1050    benzonatate capsule 100 mg  100 mg Oral TID PRN Raphael Hathaway MD        cefepime in dextrose 5 % IVPB 2 g  2 g Intravenous Q8H Raphael Hathaway MD   Stopped at 03/23/23 0146    cilostazoL tablet 100 mg  100 mg Oral BID David Marroquin MD   100 mg at 03/22/23 2044    dextromethorphan-guaiFENesin  mg/5 ml liquid 10 mL  10 mL Oral Q4H PRN Jennifer Singleton MD   10 mL at 03/18/23 2138    famotidine (PF) injection 20 mg  20 mg Intravenous Daily David Marroquin MD   20 mg at 03/22/23 1051    furosemide tablet 40 mg  40 mg Oral BID David Marroquin MD   40 mg at 03/22/23 2044    guaiFENesin 12 hr tablet 600 mg  600 mg Oral BID Raphael Hathaway MD   600 mg at 03/22/23 2044    melatonin tablet 6 mg  6 mg Oral Nightly PRN David Marroquin MD        metoprolol tartrate (LOPRESSOR) tablet 50 mg  50 mg Oral TID David Marroquin MD   50 mg at 03/22/23 2044    ondansetron injection 4 mg  4 mg Intravenous Q8H PRN David Marroquin MD        oxyCODONE immediate release tablet 10 mg  10 mg Oral Q6H PRN David Marroquin MD        prochlorperazine injection Soln 5 mg  5 mg Intravenous Q6H PRN David Marroquin MD        sodium chloride 0.9% flush 10 mL  10 mL Intravenous PRN David Marroquin MD         Facility-Administered Medications Ordered in Other Encounters   Medication Dose Route Frequency Provider Last Rate Last Admin    heparin, porcine (PF) 100 unit/mL injection flush 500 Units  500  Units Intravenous PRN Sameera Amador MD   500 Units at 01/20/23 1652    sodium chloride 0.9% flush 10 mL  10 mL Intravenous PRN Sameera Amador MD   10 mL at 01/20/23 1652     Current Discharge Medication List        START taking these medications    Details   cefepime in dextrose 5 % (MAXIPIME) 2 gram/50 mL PgBk Inject 50 mLs (2 g total) into the vein every 8 (eight) hours.  Qty: 750 mL, Refills: 0      Continue through 3/28/23     CONTINUE these medications which have NOT CHANGED    Details   albuterol (VENTOLIN HFA) 90 mcg/actuation inhaler Inhale 2 puffs into the lungs every 6 (six) hours as needed for Wheezing. Rescue  Qty: 18 g, Refills: 11      albuterol-ipratropium (DUO-NEB) 2.5 mg-0.5 mg/3 mL nebulizer solution Inhale contents of 1 vial (3 mLs) by nebulization every 4 (four) hours as needed for Wheezing or Shortness of Breath. Rescue  Qty: 90 mL, Refills: 1      amLODIPine (NORVASC) 10 MG tablet Take 1 tablet (10 mg total) by mouth once daily.  Qty: 90 tablet, Refills: 3    Comments: .      apixaban (ELIQUIS) 5 mg Tab Take 1 tablet (5 mg total) by mouth 2 (two) times daily.  Qty: 60 tablet, Refills: 1      ascorbic acid-multivit-min (VITAMIN C ENERGY BOOSTER) 1,000 mg PwEP Take 3,000 mg by mouth once daily. Patient takes 3,000 mg per day      aspirin 81 MG Chew Take 1 tablet (81 mg total) by mouth once daily.  Qty: 30 tablet, Refills: 11      atorvastatin (LIPITOR) 80 MG tablet Take 1 tablet (80 mg total) by mouth once daily.  Qty: 90 tablet, Refills: 3      cilostazoL (PLETAL) 100 MG Tab Take 1 tablet (100 mg total) by mouth 2 (two) times daily.  Qty: 60 tablet, Refills: 11    Associated Diagnoses: Peripheral arterial disease      furosemide (LASIX) 40 MG tablet Take 1 tablet (40 mg total) by mouth 2 (two) times a day.  Qty: 60 tablet, Refills: 2    Associated Diagnoses: Localized edema      metoprolol tartrate (LOPRESSOR) 50 MG tablet Take 1 tablet (50 mg total) by mouth 3 (three) times  daily.  Qty: 90 tablet, Refills: 11    Comments: .      oxyCODONE (ROXICODONE) 15 MG Tab TAKE 1 PO AT NIGHT PRN PAIN AND 1/3 PILL DAILY PRN PAIN  Qty: 40 tablet, Refills: 0    Comments: Quantity prescribed more than 7 day supply? Yes, quantity medically necessary  Associated Diagnoses: End of life care; Cancer associated pain      polyethylene glycol (GLYCOLAX) 17 gram/dose powder Mix 1 capful (17 grams total) with a liquid and take by mouth once daily.  Qty: 510 g, Refills: 0      tiotropium (SPIRIVA) 18 mcg inhalation capsule Inhale 1 capsule (18 mcg total) into the lungs via handihaler once daily. Controller  Qty: 30 capsule, Refills: 1               I have seen and examined this patient within the last 30 days. My clinical findings that support the need for the home health skilled services and home bound status are the following:no   Medical restrictions requiring assistance of another human to leave home due to  IV infusion Needs.     Diet:   2 gram sodium diet    Labs:  CBC, BMP, ESR, CRP  Monday.  Fax to ID clinic at [phone]    Referrals/ Consults  Physical Therapy to evaluate and treat. Evaluate for home safety and equipment needs; Establish/upgrade home exercise program. Perform / instruct on therapeutic exercises, gait training, transfer training, and Range of Motion.  Occupational Therapy to evaluate and treat. Evaluate home environment for safety and equipment needs. Perform/Instruct on transfers, ADL training, ROM, and therapeutic exercises.  Aide to provide assistance with personal care, ADLs, and vital signs.    Activities:   activity as tolerated    Nursing:   Agency to admit patient within 24 hours of hospital discharge unless specified on physician order or at patient request    SN to complete comprehensive assessment including routine vital signs. Instruct on disease process and s/s of complications to report to MD. Review/verify medication list sent home with the patient at time of discharge   and instruct patient/caregiver as needed. Frequency may be adjusted depending on start of care date.     Skilled nurse to perform up to 3 visits PRN for symptoms related to diagnosis    Notify MD if SBP > 160 or < 90; DBP > 90 or < 50; HR > 120 or < 50; Temp > 101; O2 < 88%; Other:       Ok to schedule additional visits based on staff availability and patient request on consecutive days within the home health episode.    When multiple disciplines ordered:    Start of Care occurs on Sunday - Wednesday schedule remaining discipline evaluations as ordered on separate consecutive days following the start of care.    Thursday SOC -schedule subsequent evaluations Friday and Monday the following week.     Friday - Saturday SOC - schedule subsequent discipline evaluations on consecutive days starting Monday of the following week.    For all post-discharge communication and subsequent orders please contact patient's primary care physician.   Miscellaneous   Pull picc line after last Abx on 3/28/23         I certify that this patient is confined to his home and needs intermittent skilled nursing care, physical therapy, and occupational therapy.

## 2023-03-23 NOTE — PROGRESS NOTES
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     Patient presented with severe respiratory distress that started shortly prior to presentation.  Per his wife, he started having worsening shortness of breath despite receiving DuoNebs and she noted that he was choking.  Despite being on 4 oxygen at home, he was hypoxic to 65%.  Additional symptoms include decreased p.o. intake and diarrhea which improved.    In the ED, the patient was tachycardic (up to 160), tachypneic and hypoxic requiring BiPAP.  Labs were remarkable for leukocytosis (32.7), elevated D-dimer (> 33.0), elevated BNP (272), elevated troponin (0.045), elevated lactic acid (6.7) and positive influenza A.  Chest x-ray showed persistent bilateral airspace and pleural opacities, slightly improved in the left upper lung since prior exam.  Left upper extremity ultrasound showed no thrombus in central veins.  CTA chest showed no PE, interval enlargement of known left supraclavicular mass/adenopathy, new masslike consolidation with air bronchograms in the right middle lobe, likely pneumonic process and new right hilar node measuring 26 x 22 mm..  He was given DuoNebs x3, 1.9 L of LR, 500 mL of NS, vancomycin, Zosyn, and was admitted for further management.          Overview/Hospital Course:  Mr Kashif Iverson was admitted with acute on chronic hypoxic respiratory  failure secondary to pneumonia and influenza A. Started antibitoics and tamiflu. Started vapotherm. Pulmonary following. He has had some confusion; CTH no acute changes. Improved respiratory status. Weaned to 5L NC. Blood cultures from admit with Acinetobacter bacteremia which has cleared. ID consulted. Stepped down to floor.  PT/OT consulted and rec: H/H on discharge. ID recs Cefepime through 3/28. Patient already on 4L of 02 at home       Interval History: No new issues     Review of Systems   Constitutional:  Positive for activity change.   HENT:  Negative for congestion.    Respiratory:  Negative for chest tightness and shortness of breath.    Genitourinary:  Negative for difficulty urinating.   Objective:     Vital Signs (Most Recent):  Temp: 97.2 °F (36.2 °C) (03/23/23 0545)  Pulse: 88 (03/23/23 0545)  Resp: 18 (03/23/23 0015)  BP: 131/82 (03/23/23 0545)  SpO2: (!) 92 % (03/23/23 0545)   Vital Signs (24h Range):  Temp:  [97.2 °F (36.2 °C)-98.7 °F (37.1 °C)] 97.2 °F (36.2 °C)  Pulse:  [73-89] 88  Resp:  [17-18] 18  SpO2:  [92 %-100 %] 92 %  BP: (113-131)/(57-86) 131/82     Weight: 80 kg (176 lb 5.9 oz)  Body mass index is 25.31 kg/m².    Intake/Output Summary (Last 24 hours) at 3/23/2023 0653  Last data filed at 3/23/2023 0545  Gross per 24 hour   Intake 600 ml   Output 1650 ml   Net -1050 ml      Physical Exam  Vitals and nursing note reviewed.   Constitutional:       Appearance: Normal appearance.   Pulmonary:      Effort: Pulmonary effort is normal. No respiratory distress.   Neurological:      Mental Status: He is alert and oriented to person, place, and time.       Significant Labs: All pertinent labs within the past 24 hours have been reviewed.  BMP:   Recent Labs   Lab 03/23/23  0356   GLU 97      K 4.0      CO2 24   BUN 9   CREATININE 0.8   CALCIUM 9.2   MG 1.9     CBC: No results for input(s): WBC, HGB, HCT, PLT in the last 48 hours.    Significant Imaging:       Assessment/Plan:      *  Acute on chronic respiratory failure with hypoxia  -Admitted to inpatient status  -Patient admitted with Hypoxic which is Acute on Chronic.   -He is on home oxygen at 4 LPM.   -Signs/symptoms of respiratory failure included- tachypnea, increased work of breathing, respiratory distress, use of accessory muscles and wheezing present on admission.   -Labs and images were reviewed. Patient Has not has a recent ABG.   -Supplemental oxygen was provided and noted  -Respiratory failure is due to- Pneumonia, influenza, COPD and lung cancer  -Treated in ICU with vapotherm, antiviral, antibiotics and nebulizers.  Weaned to nasal canula and stepped down to the floor on 3/19  -Completes tamiflu today  -Continue antibiotics for bacteremia and possible bacterial pneumonia as below  -Currently on 5L NC O2 - wean as able.    -Add incentive spirometry, tessalon and guaifenesin    Continue Abx.  Home likely on 3/23.  Resolved. Patient at his baseline 4L from home    Pneumonia  -Influenza A + suspected bacterial pneumonia as well.   -Cough has been dry and have been unable to collect sputum culture.   -Blood cultures with Acinetobacter  -Treatment as above for Sepsis    Acinetobacter lwoffi Bacteremia  -Treatment as above.    Prolonged Q-T interval on ECG  -Noted- monitor     Influenza A  -Completes tamiflu day 5 today.    Advanced care planning/counseling discussion  Advance Care Planning  Date: 03/16/2023 Code Status Dr Marroquin discussed the patient's code status with the patient and his wife.  He wants to remain full code at this time.  Dr Marroquin spent a total of 2 minutes engaging the patient in this advance care planning discussion.  -I attempted to engage patient in goals of care discussion today but he quickly stated he was not interested in discussing this  -Palliative care consulted and input appreciated.  Fortunately their skilled discussions have been more productive.  Patient's wife is considering hospice care at  discharge.      Persistent atrial fibrillation  -[id number] Score: 3   -Continue home metoprolol and Eliquis  -Telemetry monitoring    COPD (chronic obstructive pulmonary disease)  -No signs of current exacerbation  -Pulmonology recommends starting triple therapy inhaler (Breztri or Trelegy) at discharge after he recovers from his pneumonia  -Continue nebs    Severe sepsis  -Patient admitted with severe sepsis  -Sepsis was due to influenza, possible secondary superimposed bacterial pneumonia of RML and Acinetobacter bacteremia.  -Completes day 5 of tamiflu today  -Blood cultures 3/15 grew Acinetobacter sensitive to cefepime.    -Repeat blood cultures 3/16 negative  -Source of bacteremia unclear - ?due to his port?  -Has been treated with vanc and zosyn x 5 days.  Will stop these and start cefepime.  -Consult general surgery for evaluation of port removal (no longer needs port as not a candidate for further chemo)  -Consult ID for antibiotic recommendations.    Cefepime through 3/28.    Coronary artery disease involving native coronary artery of native heart without angina pectoris  -No active chest pain or signs of ACS.   -Continue asa, statin and metoprolol    PAD (peripheral artery disease)  -No signs of lower extremity ulceration/wound  -Continue asa, statin     Acute encephalopathy  -At times he seems somewhat confused and removes his oxygen and intermittently does not remember the status of his lung cancer.   -Oriented to self and location but can't remember why he is in the hospital. He does have history of strokes.   -Other care providers state he has been confused like this on past admissions.   -Unclear if this is baseline or hospitalization or infection related  -CTH no acute changes.   -Improving  -Ordered delirium precautions      Chronic diastolic heart failure  -This is chronic diastolic chf  -Note: previously did have mixed systolic/diastolic CHF with EF down to 35% (2/2022). EF has recovered.   -Echo  9/22 showed EF 60%, normal diastolic function and pasp 40mmHg  - on admit  -Strict ins/outs and daily weights  -Continue PO lasix and metoprolol.    -BP is soft and not much room to start ACEi/ARB or entresto at this time    Hypokalemia  -K normal today  -Repeat BMP in AM    Essential hypertension  -BP currently well controlled - SBP in low 100s  -Continue home metoprolol and amlodipine     Lung cancer, main bronchus, left  -He follows with Oncology.   -He was previously receiving palliative chemotherapy.   -Per most recently Oncology notes, he is no longer a candidate for chemotherapy and hospice was recommended  -Consult general surgery for evaluation of port removal  -Palliative care on board and are actively discussing possible hospice care at discharge.    History of completed stroke  -No new neurologic deficit on exam but somewhat confused at times.   -CTH without any acute changes  -Continue asa, eliquis, statin       VTE Risk Mitigation (From admission, onward)         Ordered     apixaban tablet 5 mg  2 times daily         03/16/23 0221     IP VTE HIGH RISK PATIENT  Once         03/16/23 0221     Place sequential compression device  Until discontinued         03/16/23 0221                Discharge Planning   [place]: 3/22/2023     Code Status: Full Code   Is the patient medically ready for discharge?:     Reason for patient still in hospital (select all that apply): Patient unstable  Discharge Plan A: Home with family, Home Health   Discharge Delays: None known at this time              Carroll Syed MD  Department of Hospital Medicine   [facility] - Telemetry

## 2023-03-23 NOTE — NURSING
Report called to [facility] to NEERAJ Mckinney. Updated information and vitals were given. Awaiting transportation.

## 2023-03-23 NOTE — NURSING
Report received from night nurse NEERAJ Gonzales. Visualized patient and assessed patient's overall condition and appearance. No acute distress noted. Will continue to monitor

## 2023-03-23 NOTE — PLAN OF CARE
03/23/23 0955   Medicare Message   Important Message from Medicare regarding Discharge Appeal Rights Given to patient/caregiver;Explained to patient/caregiver;Other (comments)  (SW explained IMM to patient spouse via phone. Copy of IMM left at bedside.)

## 2023-03-23 NOTE — NURSING
Nurses Note -- 4 Eyes      3/23/2023   8:59 AM      Skin assessed during: Q Shift Change      [x] No Pressure Injuries Present    []Prevention Measures Documented      [] Yes- Altered Skin Integrity Present or Discovered   [] LDA Added if Not in Epic (Describe Wound)   [] New Altered Skin Integrity was Present on Admit and Documented in LDA   [] Wound Image Taken    Wound Care Consulted? No    Attending Nurse:  Lisa Gresham LPN     Second RN/Staff Member:  Janet Leija RN

## 2023-03-23 NOTE — NURSING
Patient will be discharged home to complete antibiotic treatment on 3/28. I spoke with  and ok to DC home with current midline. No need to place a PICC at this time. Line flushes easily

## 2023-03-23 NOTE — ASSESSMENT & PLAN NOTE
-Admitted to inpatient status  -Patient admitted with Hypoxic which is Acute on Chronic.   -He is on home oxygen at 4 LPM.   -Signs/symptoms of respiratory failure included- tachypnea, increased work of breathing, respiratory distress, use of accessory muscles and wheezing present on admission.   -Labs and images were reviewed. Patient Has not has a recent ABG.   -Supplemental oxygen was provided and noted  -Respiratory failure is due to- Pneumonia, influenza, COPD and lung cancer  -Treated in ICU with vapotherm, antiviral, antibiotics and nebulizers.  Weaned to nasal canula and stepped down to the floor on 3/19  -Completes tamiflu today  -Continue antibiotics for bacteremia and possible bacterial pneumonia as below  -Currently on 5L NC O2 - wean as able.    -Add incentive spirometry, tessalon and guaifenesin    Continue Abx.  Home likely on 3/23.  Resolved. Patient at his baseline 4L from home

## 2023-03-23 NOTE — PLAN OF CARE
"   [facility]     Department of Hospital Medicine     [address] [place] [address]     [phone] after hours  [phone] fax       LTAC    03/23/2023    Admit to [facility]    Diagnoses:  bacteremia  Active Hospital Problems    Diagnosis  POA    *Acute on chronic respiratory failure with hypoxia [J96.21]  Yes     Priority: 1 - High    Pneumonia [J18.9]  Yes    Influenza A [J10.1]  Yes    Prolonged Q-T interval on ECG [R94.31]  Yes    Acinetobacter lwoffi Bacteremia [A49.8]  Yes    Advanced care planning/counseling discussion [Z71.89]  Not Applicable    Debility [R53.81]  Yes    Persistent atrial fibrillation [I48.19]  Yes    COPD (chronic obstructive pulmonary disease) [J44.9]  Yes    Severe sepsis [A41.9, R65.20]  Yes    PAD (peripheral artery disease) [I73.9]  Yes     LUIS FELIPE 3/11/22      Coronary artery disease involving native coronary artery of native heart without angina pectoris [I25.10]  Yes     Chronic    Acute encephalopathy [G93.40]  Yes    Chronic diastolic heart failure [I50.32]  Yes    Hypokalemia [E87.6]  Yes    Essential hypertension [I10]  Yes     Chronic    Lung cancer, main bronchus, left [C34.02]  Yes     Chronic     8/30/2019 underwent bronchoscopy that showed "A partially obstructing (about 80% obstructed) mass was found in the middle portion in the left mainstem bronchus. The mass was large and bloody, circumferential, endobronchial, friable and necrotic. 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  No actionable mutations and PD-L1 5%.  9/19/21 staging PET CT showed "Hypermetabolic left lung mass concerning for primary pulmonary malignancy with metastatic disease involving the right 6th and 9th ribs as well as mediastinal and left supraclavicular lymph nodes."  Stage IV squamous cell carcinoma of the lung at diagnosis.    10/8/2019-10/21/2019 he received " palliative chemoradiation for rib pain with carboplatin and paclitaxel.  He received 3 cycles of carboplatin and paclitaxel.  He developed anaphylactic reaction to paclitaxel.  The chest was treated with AP:PA fields and the right 9th rib was treated with anterior and posterior oblique fields at 300 cGy per fraction to 3000 cGy.   Lt chest 6X 300 10 / 10 3,000   Rt 9th rib 6X 300 10 / 10 3,000     10/31/2019-9/10/2020 he received pembrolizumab (completed 15 cycles, last dose 8/21/2020)  9/10/2020 PET CT showed progression of disease.  He was then referred to Dr. Key for evaluation for clinical trials.  10/20/2020 he underwent repeat biopsy for LUNG MAP trial and was randomized to Ramucirumab + pembrolizumab.    11/17/2020 started ramucirumab+pembrolizumab.  Completed 4 cycles and then 2/10/2021 scans showed progression.    2/24/2021 he was subsequent started on gemcitabine with Dr. Cruz.      History of completed stroke [Z86.73]  Not Applicable       09/03/2019  On CT exam        Resolved Hospital Problems   No resolved problems to display.       Patient is homebound due to:  Acute on chronic respiratory failure with hypoxia    Allergies:Review of patient's allergies indicates:  No Known Allergies    Vitals:    Routine    Diet: Low NA    Acitivities:     - Up in a chair each morning as tolerated           Nursing Precautions:       - Fall precautions per nursing home protocol      - Decubitus precautions:        -  for positioning   - Pressure reducing foam mattress   - Turn patient every two hours. Use wedge pillows to anchor patient    CONSULTS:     Physical Therapy to evaluate and treat     Occupational Therapy to evaluate and treat         MISCELLANEOUS CARE:      Routine Picc line care.  Pull picc after Abx on 3/28/23              .        Medications: Discontinue all previous medication orders, if any. See new list below.       Medication List        START taking these medications      cefepime in  dextrose 5 % 2 gram/50 mL Pgbk  Commonly known as: MAXIPIME  Inject 50 mLs (2 g total) into the vein every 8 (eight) hours.  Continue Abx through 3/28/23          CONTINUE taking these medications      albuterol 90 mcg/actuation inhaler  Commonly known as: VENTOLIN HFA  Inhale 2 puffs into the lungs every 6 (six) hours as needed for Wheezing. Rescue     albuterol-ipratropium 2.5 mg-0.5 mg/3 mL nebulizer solution  Commonly known as: DUO-NEB  Inhale contents of 1 vial (3 mLs) by nebulization every 4 (four) hours as needed for Wheezing or Shortness of Breath. Rescue     amLODIPine 10 MG tablet  Commonly known as: NORVASC  Take 1 tablet (10 mg total) by mouth once daily.     apixaban 5 mg Tab  Commonly known as: ELIQUIS  Take 1 tablet (5 mg total) by mouth 2 (two) times daily.     aspirin 81 MG Chew  Take 1 tablet (81 mg total) by mouth once daily.     atorvastatin 80 MG tablet  Commonly known as: LIPITOR  Take 1 tablet (80 mg total) by mouth once daily.     cilostazoL 100 MG Tab  Commonly known as: PLETAL  Take 1 tablet (100 mg total) by mouth 2 (two) times daily.     furosemide 40 MG tablet  Commonly known as: LASIX  Take 1 tablet (40 mg total) by mouth 2 (two) times a day.     GAVILAX 17 gram/dose powder  Generic drug: polyethylene glycol  Mix 1 capful (17 grams total) with a liquid and take by mouth once daily.     metoprolol tartrate 50 MG tablet  Commonly known as: LOPRESSOR  Take 1 tablet (50 mg total) by mouth 3 (three) times daily.     oxyCODONE 15 MG Tab  Commonly known as: ROXICODONE  TAKE 1 PO AT NIGHT PRN PAIN AND 1/3 PILL DAILY PRN PAIN     SPIRIVA WITH HANDIHALER 18 mcg inhalation capsule  Generic drug: tiotropium  Inhale 1 capsule (18 mcg total) into the lungs via handihaler once daily. Controller     VITAMIN C ENERGY BOOSTER 1,000 mg Pwep  Generic drug: ascorbic acid-multivit-min  Take 3,000 mg by mouth once daily. Patient takes 3,000 mg per day                     _________________________________  Carroll Syed MD  03/23/2023

## 2023-03-23 NOTE — NURSING
In preparation for discharge, pt's midline was intact for continued IV antibiotics. No redness or swelling noted. Instructions given. Reviewed all new meds, continued medications, follow up appointments and signs and symptoms to report to PCP or seek emergency medical care. Pt and pt's wife (Natty) verbalized understanding to all instructions and education. Pt denies any complaints or concerns at this time. Pt was transported off the unit to wheelchair van for discharge.

## 2023-03-23 NOTE — PLAN OF CARE
RODOLFO spoke with Kaela ([facility]) re: teachings and contacting patient spouse to complete teaching. Kaela informed SW that she tried contacting patient spouse 3x and did not receive an answer. Kaela confirmed patient spouse # with SW.     RODOLFO spoke with spouse re: [facility] trying to contact her to schedule teachings. Patient spouse asked for [facility] (Kaela) to contact her again to schedule teachings at hospital.     RODOLFO spoke with Kaela ([facility]) informing her that patient spouse asks for another call.     Patient spouse will be meeting with Kaela ([facility]) for 1:00 pm to complete teachings.     RODOLFO received call from Kaela ([facility]) that patient spouse is not able to afford patient's IV abx and not able to setup a payment plan. RODOLFO request for process price of meds.     RODOLFO spoke with patient spouse via phone. RODOLFO explained LTAC and SNF as disposition options. Patient spouse stated she is ok with patient going to [facility] (LTAC) or to any SNF placement that could provide patient with the care needed.     RODOLFO spoke with Melany ([facility]) notifying her of LTAC referral. Melany stated she will review patient's referral and get back with RODOLFO.     RODOLFO received call from Melany ([facility]) informing SW that she met with patient at bedside and she talked with patient spouse. Melany stated she can accept patient today and request for LTAC Plan of Care.     RODOLFO secure chat MD for updated to Plan of Care (LTAC Plan of Care).

## 2023-03-23 NOTE — PLAN OF CARE
[facility] - Telemetry  Discharge Final Note    Primary Care Provider: Eduardo Ruiz MD    Expected Discharge Date: 3/23/2023    Final Discharge Note (most recent)       Final Note - 03/23/23 1516          Final Note    Assessment Type Final Discharge Note     Anticipated Discharge Disposition Long Term Acute Care     What phone number can be called within the next 1-3 days to see how you are doing after discharge? [phone]        Post-Acute Status    Post-Acute Authorization Placement;Home Health;IV Infusion     Post-Acute Placement Status Set-up Complete/Auth obtained     Home Health Status Discharge Plan Changed     Coverage Medicare A/B     IV Infusion Status Patient declined/refused     Discharge Delays None known at this time                     Important Message from Medicare  Important Message from Medicare regarding Discharge Appeal Rights: Given to patient/caregiver, Explained to patient/caregiver, Other (comments) (SW explained IMM to patient spouse via phone. Copy of IMM left at bedside.)          Contact Info       Eduardo Ruiz MD   Specialty: Family Medicine   Relationship: PCP - General  [facility] Responsible Provider    [address] [address]   Phone: [phone]       Next Steps: Follow up in 1 week(s)          RODOLFO received report information from Melany ([facility]). Patient will be going to room 4232, and [facility] nurse to call report [phone] at 4:00 pm and request for a transportation  for 5:30 pm.    RODOLFO secure chat nurse Lisa with report information to [facility] and transportation  time for 5:30 pm.    RODOLFO spoke with patient spouse informing her that patient will discharge today to [facility]. Patient spouse asked time of ? SW stated 5:30 pm  time. Patient spouse stated thanks you.    ADT 30 order placed for Van Transportation.  Requested  time: 5:30 pm  If transportation does not arrive at ETA time nurse will be  instructed to follow protocol for transportation below:  How can I get in touch directly with dispatch, if needed?                 Non-emergent dispatch: [phone]      +++NURSING:  If Van does not arrive at requested time please call the above Non Emergent Dispatcher.  If issue not resolved please escalate to your charge nurse for further instructions.

## 2023-03-23 NOTE — SUBJECTIVE & OBJECTIVE
Interval History: No new issues     Review of Systems   Constitutional:  Positive for activity change.   HENT:  Negative for congestion.    Respiratory:  Negative for chest tightness and shortness of breath.    Genitourinary:  Negative for difficulty urinating.   Objective:     Vital Signs (Most Recent):  Temp: 97.2 °F (36.2 °C) (03/23/23 0545)  Pulse: 88 (03/23/23 0545)  Resp: 18 (03/23/23 0015)  BP: 131/82 (03/23/23 0545)  SpO2: (!) 92 % (03/23/23 0545)   Vital Signs (24h Range):  Temp:  [97.2 °F (36.2 °C)-98.7 °F (37.1 °C)] 97.2 °F (36.2 °C)  Pulse:  [73-89] 88  Resp:  [17-18] 18  SpO2:  [92 %-100 %] 92 %  BP: (113-131)/(57-86) 131/82     Weight: 80 kg (176 lb 5.9 oz)  Body mass index is 25.31 kg/m².    Intake/Output Summary (Last 24 hours) at 3/23/2023 0653  Last data filed at 3/23/2023 0545  Gross per 24 hour   Intake 600 ml   Output 1650 ml   Net -1050 ml      Physical Exam  Vitals and nursing note reviewed.   Constitutional:       Appearance: Normal appearance.   Pulmonary:      Effort: Pulmonary effort is normal. No respiratory distress.   Neurological:      Mental Status: He is alert and oriented to person, place, and time.       Significant Labs: All pertinent labs within the past 24 hours have been reviewed.  BMP:   Recent Labs   Lab 03/23/23  0356   GLU 97      K 4.0      CO2 24   BUN 9   CREATININE 0.8   CALCIUM 9.2   MG 1.9     CBC: No results for input(s): WBC, HGB, HCT, PLT in the last 48 hours.    Significant Imaging:

## 2023-03-27 NOTE — TELEPHONE ENCOUNTER
----- Message from Niki Guy sent at 3/27/2023  8:40 AM CDT -----  Name of Who is Calling: Natty, Wife of ARCELIA RAMA [[id number]]              What is the request in detail: Wife of Patient requesting a call back to discuss possibly having appt done virtually today              Can the clinic reply by [facility]: No              What Number to Call Back if not in [facility]: [phone]

## 2023-03-27 NOTE — TELEPHONE ENCOUNTER
Attempted to return call to patient, no answer. Voicemail left advising appointment scheduled today for hospital follow up will not be able to be completed as he is currently admitted to LTAC facility. Advised that we will need to reschedule once he is discharged.

## 2023-03-31 NOTE — PROGRESS NOTES
Outpatient Care Management  Patient Not Qualified    Patient: Kashif Iverson  MRN:  [id number]  Date of Service:  3/31/2023  Completed by:  Yumiko Rizo RN    Chief Complaint   Patient presents with    OPCM Enrollment Call    OPCM Chart Review       Patient Summary     Program:  OPCM High Risk     Reason Not Qualified:  Other (see comment)    Per chart review the pt was transferred to [facility].

## 2023-04-04 NOTE — PROGRESS NOTES
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     Patient presented with severe respiratory distress that started shortly prior to presentation.  Per his wife, he started having worsening shortness of breath despite receiving DuoNebs and she noted that he was choking.  Despite being on 4 oxygen at home, he was hypoxic to 65%.  Additional symptoms include decreased p.o. intake and diarrhea which improved.    In the ED, the patient was tachycardic (up to 160), tachypneic and hypoxic requiring BiPAP.  Labs were remarkable for leukocytosis (32.7), elevated D-dimer (> 33.0), elevated BNP (272), elevated troponin (0.045), elevated lactic acid (6.7) and positive influenza A.  Chest x-ray showed persistent bilateral airspace and pleural opacities, slightly improved in the left upper lung since prior exam.  Left upper extremity ultrasound showed no thrombus in central veins.  CTA chest showed no PE, interval enlargement of known left supraclavicular mass/adenopathy, new masslike consolidation with air bronchograms in the right middle lobe, likely pneumonic process and new right hilar node measuring 26 x 22 mm..  He was given DuoNebs x3, 1.9 L of LR, 500 mL of NS, vancomycin, Zosyn, and was admitted for further management.          Overview/Hospital Course:  Mr Kashif Iverson was admitted with acute on chronic hypoxic respiratory  failure secondary to pneumonia and influenza A. Started antibitoics and tamiflu. Started vapotherm. Pulmonary following. Requirements improving.       Interval History: Feeling better this morning- less short of breath, no chest pain, no cough. Seems still somewhat confused about why he is in the hospital and the status of his cancer.     Review of Systems   Constitutional:  Negative for chills and fever.   Respiratory:  Positive for shortness of breath. Negative for cough.    Cardiovascular:  Negative for chest pain, palpitations and leg swelling.   Gastrointestinal:  Negative for abdominal pain, constipation, diarrhea, nausea and vomiting.   Genitourinary:  Negative for difficulty urinating.   Neurological:  Negative for weakness and numbness.   Psychiatric/Behavioral:  Positive for confusion.    Objective:     Vital Signs (Most Recent):  Temp: 98.2 °F (36.8 °C) (03/17/23 0701)  Pulse: 95 (03/17/23 0900)  Resp: 19 (03/17/23 0900)  BP: 101/62 (03/17/23 0900)  SpO2: 98 % (03/17/23 0900) Vital Signs (24h Range):  Temp:  [97.7 °F (36.5 °C)-98.9 °F (37.2 °C)] 98.2 °F (36.8 °C)  Pulse:  [] 95  Resp:  [19-77] 19  SpO2:  [83 %-98 %] 98 %  BP: ()/(52-76) 101/62     Weight: 77.9 kg (171 lb 11.8 oz)  Body mass index is 24.64 kg/m².    Intake/Output Summary (Last 24 hours) at 3/17/2023 0939  Last data filed at 3/17/2023 0900  Gross per 24 hour   Intake 832.57 ml   Output 1850 ml   Net -1017.43 ml      Physical Exam  Vitals and nursing note reviewed.   Constitutional:       General: He is not in acute distress.     Appearance: He is ill-appearing. He is not toxic-appearing.   HENT:      Head: Normocephalic and atraumatic.      Nose: Nose normal.      Mouth/Throat:      Mouth: Mucous membranes are moist.   Cardiovascular:      Rate and Rhythm: Normal rate and regular rhythm.   Pulmonary:      Effort: Pulmonary effort is normal.      Breath sounds: Rhonchi present.      Comments: Vapotherm 20/60  Abdominal:       General: Bowel sounds are normal. There is no distension.      Palpations: Abdomen is soft. There is no mass.      Tenderness: There is no abdominal tenderness. There is no guarding.   Musculoskeletal:         General: Swelling (L arm) present.      Right lower leg: No edema.      Left lower leg: No edema.   Skin:     General: Skin is warm and dry.      Findings: Bruising present.   Neurological:      Mental Status: He is alert.      Comments: Oriented to self and location. Not oriented to situation       Significant Labs: All pertinent labs within the past 24 hours have been reviewed.    Significant Imaging: I have reviewed all pertinent imaging results/findings within the past 24 hours.      Assessment/Plan:      * Acute on chronic respiratory failure with hypoxia  Patient admitted with Hypoxic which is Acute on Chronic.  he is on home oxygen at 4 LPM. Signs/symptoms of respiratory failure include- tachypnea, increased work of breathing, respiratory distress, use of accessory muscles and wheezing present on admission.   - Labs and images were reviewed. Patient Has not has a recent ABG.   - Supplemental oxygen was provided and noted- vapotherm   - Respiratory failure is due to- Pneumonia   - treatment: see influenza and sepsis      Pneumonia  Influenza A + suspected bacterial pneumonia as well. Unable to collect sputum culture. Blood cultures with GNR  - continue antibiotics and tamiflu  - follow up blood cultures       Bacteremia  Blood cultures from admit with GNR bacteremia  - repeat cultures ordered  - speciation pending  - continue antibiotics       Prolonged Q-T interval on ECG  Noted- monitor       Influenza A  Contributes to his respiratory failure   - continue Tamiflu x5 days      Advanced care planning/counseling discussion  Advance Care Planning     Date: 03/16/2023    Code Status  Dr Marroquin discussed the patient's code status with the patient and his wife.  He wants to remain full code at this time.    Lopez spent a total of 2 minutes engaging the patient in this advance care planning discussion.  - Palliative care consulted.            Persistent atrial fibrillation  [id number] Score: 3   Continue home metoprolol and Eliquis  Telemetry monitoring    COPD (chronic obstructive pulmonary disease)  No signs of current exacerbation  - continue nebs  - will not start steroids at this point         Severe sepsis  This patient does have evidence of infective focus  My overall impression is severe sepsis.  Source: Respiratory  Antibiotics given-   Antibiotics (72h ago, onward)    Start     Stop Route Frequency Ordered    03/16/23 1130  vancomycin (VANCOCIN) 1,000 mg in dextrose 5 % (D5W) 250 mL IVPB (Vial-Mate)         -- IV Every 12 hours (non-standard times) 03/16/23 0301    03/16/23 0700  piperacillin-tazobactam (ZOSYN) 4.5 g in dextrose 5 % in water (D5W) 5 % 100 mL IVPB (MB+)  (ED Adult Sepsis Treatment)         -- IV Every 8 hours (non-standard times) 03/16/23 0222    03/16/23 0322  vancomycin - pharmacy to dose  (vancomycin IVPB)        See Hyperspace for full Linked Orders Report.    -- IV pharmacy to manage frequency 03/16/23 0222        Latest lactate reviewed-  Recent Labs   Lab 03/15/23  2212 03/16/23  0134   LACTATE 6.7* 3.7*     Organ dysfunction indicated by Acute respiratory failure and Encephalopathy    Fluid challenge Actual Body weight- Patient will receive 30ml/kg actual body weight to calculate fluid bolus for treatment of septic shock.     Post- resuscitation assessment Yes Perfusion exam was performed within 6 hours of septic shock presentation after bolus shows Adequate tissue perfusion assessed by non-invasive monitoring       Will Not start Pressors- Levophed for MAP of 65  Source control achieved by: antibiotics     Coronary artery disease involving native coronary artery of native heart without angina pectoris  No active chest pain or signs of ACS.   - continue asa, statin    PAD (peripheral  artery disease)  No signs of lower extremity ulceration/wound  Continue asa, statin       Acute encephalopathy  He seems somewhat confused. Cannot remember the status of his lung cancer. Oriented to self and location but can't remember why he is in the hospital. He does have history of strokes. Other care providers state he has been confused like this on past admissions. This may be due to sepsis  - will check CTH out of caution      Chronic diastolic heart failure  Results for orders placed during the hospital encounter of 09/28/22    Echo    Interpretation Summary  · The left ventricle is normal in size with concentric hypertrophy and normal systolic function.  · The estimated ejection fraction is 60%.  · Normal left ventricular diastolic function.  · Mild right ventricular enlargement with mildly reduced right ventricular systolic function.  · Mild left atrial enlargement.  · Mild right atrial enlargement.  · Normal central venous pressure (3 mmHg).  · The estimated PA systolic pressure is 40 mmHg.  · Trivial posterior pericardial effusion.    No signs of acute exacerbation  BNP  Recent Labs   Lab 03/15/23  2212   *     BNP was mildly elevated on admit- may try IV lasix to help with weaning O2 tomorrow. Currently continues on PO lasix   Note: previously did have mixed systolic/diastolic CHF with EF down to 35% (2/2022). EF has recovered.   - continues on BB  - not on ACEi/ARB/entersto/spironolactone as outpatient. Will not start now       Hypokalemia  Replace and monitor  Lab Results   Component Value Date    K 2.9 (L) 03/17/2023           Essential hypertension  BP currently well controlled  Continue home metoprolol. Holding home amlodipine for today, may resume tomorrow pending BP trend  Monitor BP        Lung cancer, main bronchus, left  He follows with Oncology. He was previously receiving palliative chemotherapy. Per most recently Oncology notes, he is no longer a candidate for chemotherapy.        History of completed stroke  Prior CTH 11/2022 shows R parietal and frontal infarct. No new neurologic deficit on exam but remains somewhat altered  - will check CTH  - continue asa, eliquis, statin         VTE Risk Mitigation (From admission, onward)         Ordered     apixaban tablet 5 mg  2 times daily         03/16/23 0221     IP VTE HIGH RISK PATIENT  Once         03/16/23 0221     Place sequential compression device  Until discontinued         03/16/23 0221                Discharge Planning   AZ: 3/21/2023     Code Status: Full Code   Is the patient medically ready for discharge?:     Reason for patient still in hospital (select all that apply): Patient trending condition  Discharge Plan A: Home with family            Critical care time spent on the evaluation and treatment of severe organ dysfunction, review of pertinent labs and imaging studies, discussions with consulting providers and discussions with patient/family: 30 minutes.      Jennifer Singleton MD  Department of Hospital Medicine   [facility] - Intensive Care     Low Dose Naltrexone Pregnancy And Lactation Text: Naltrexone is pregnancy category C.  There have been no adequate and well-controlled studies in pregnant women.  It should be used in pregnancy only if the potential benefit justifies the potential risk to the fetus.   Limited data indicates that naltrexone is minimally excreted into breastmilk.

## 2023-04-16 NOTE — TELEPHONE ENCOUNTER
4/9/2023    Patient on Palliative Care NP's schedule tomorrow 4/10. Called patient's to setup time for appointment. Wife reports pt is still at [facility] [place]. States he had a bad infection. She is unsure how long he will be there. Explained to her I will add him to my schedule in next 2-3 weeks so I can see him when he is discharged. Verbalized understanding. States she would like me to come when he is at home          Message Sent to  Ayde Prieto to reschedule patient in next 2-3 weeks.    Angelita Smith, MIKE, FNP-C  [facility]  [phone]

## 2023-04-26 NOTE — TELEPHONE ENCOUNTER
----- Message from Jordy Avalos sent at 4/26/2023  1:20 PM CDT -----  Regarding: call back  Type: Patient Call Back         Who called:Loan from Home health          What is the request in detail: needs to speak with dr about orders needed for home health          Can the clinic reply by [facility]?         Would the patient rather a call back or a response via [facility]? Call back          Best call back number: [phone]

## 2023-04-26 NOTE — PROGRESS NOTES
HISTORY OF PRESENT ILLNESS:  Kashif Iverson is a 62 y.o. male who presents to the clinic today for Transitional Care  .     He has endstage cancer  No new treatment  He is dealing with mental health issues  Dementia at times  Side effects of treatment and pain  Wife is caring for him  They have not opted for hospice yet.      Patient Active Problem List   Diagnosis    Former smoker    Multiple pulmonary nodules    Macrocytic anemia    History of completed stroke    Poor dentition    Lung cancer, main bronchus, left    Secondary malignant neoplasm of bone    Immunodeficiency due to drugs    Atherosclerosis of aorta    Bronchiectasis without complication    Essential hypertension    History of non-ST elevation myocardial infarction (NSTEMI)    Hypokalemia    Multifocal atrial tachycardia    Chronic diastolic heart failure    Delirium due to general medical condition    Acute encephalopathy    PAD (peripheral artery disease)    Coronary artery disease involving native coronary artery of native heart without angina pectoris    Severe sepsis    Dependence on supplemental oxygen    COPD (chronic obstructive pulmonary disease)    Acute on chronic respiratory failure with hypoxia    Persistent atrial fibrillation    Debility    Advanced care planning/counseling discussion    Squamous cell carcinoma of lung    Pleural effusion    Influenza A    Prolonged Q-T interval on ECG    Acinetobacter lwoffi Bacteremia    Pneumonia           CARE TEAM:  Patient Care Team:  Eduardo Ruiz MD as PCP - General  Shu Sams MA (Inactive) as Care Coordinator  David Alexander as Digital Medicine Health   Josias Guy, PharmD as Hypertension Digital Medicine Clinician (Pharmacist)  Eduardo Ruiz MD as Hypertension Digital Medicine Responsible Provider (Family Medicine)  [facility] (DME Provider)  Mario Hathaway MD (Inactive) as Oncologist (Hematology and Oncology)  Medicare Shared Savings Program as Hypertension Digital  "Medicine Contract         ROS        PHYSICAL EXAM:  /80   Pulse 82   Temp 98.3 °F (36.8 °C) (Oral)   Ht 5' 10" (1.778 m)   Wt 87.5 kg (192 lb 14.4 oz)   SpO2 97%   BMI 27.68 kg/m²   Wt Readings from Last 5 Encounters:   04/26/23 87.5 kg (192 lb 14.4 oz)   03/19/23 80 kg (176 lb 5.9 oz)   03/08/23 79.2 kg (174 lb 9.7 oz)   03/01/23 81.5 kg (179 lb 10.8 oz)   02/28/23 83 kg (182 lb 15.7 oz)     BP Readings from Last 5 Encounters:   04/26/23 120/80   03/23/23 116/79   03/08/23 112/70   03/03/23 118/74   02/28/23 119/73           Chronically ill appearing  His mentition is slow  S1 and S2 normal, no murmurs, clicks, gallops or rubs. Regular rate and rhythm. Chest is clear; no wheezes or rales. No edema or JVD.      Reviewed epic chart      Medication List with Changes/Refills   Current Medications    ALBUTEROL (VENTOLIN HFA) 90 MCG/ACTUATION INHALER    Inhale 2 puffs into the lungs every 6 (six) hours as needed for Wheezing. Rescue    ALBUTEROL-IPRATROPIUM (DUO-NEB) 2.5 MG-0.5 MG/3 ML NEBULIZER SOLUTION    Inhale contents of 1 vial (3 mLs) by nebulization every 4 (four) hours as needed for Wheezing or Shortness of Breath. Rescue    AMLODIPINE (NORVASC) 10 MG TABLET    Take 1 tablet (10 mg total) by mouth once daily.    APIXABAN (ELIQUIS) 5 MG TAB    Take 1 tablet (5 mg total) by mouth 2 (two) times daily.    ASCORBIC ACID-MULTIVIT-MIN (VITAMIN C ENERGY BOOSTER) 1,000 MG PWEP    Take 3,000 mg by mouth once daily. Patient takes 3,000 mg per day    ASPIRIN 81 MG CHEW    Take 1 tablet (81 mg total) by mouth once daily.    ATORVASTATIN (LIPITOR) 80 MG TABLET    Take 1 tablet (80 mg total) by mouth once daily.    CEFEPIME IN DEXTROSE 5 % (MAXIPIME) 2 GRAM/50 ML PGBK    Inject 50 mLs (2 g total) into the vein every 8 (eight) hours.    CILOSTAZOL (PLETAL) 100 MG TAB    Take 1 tablet (100 mg total) by mouth 2 (two) times daily.    FUROSEMIDE (LASIX) 40 MG TABLET    Take 1 tablet (40 mg total) by mouth 2 (two) times " "a day.    METOPROLOL TARTRATE (LOPRESSOR) 50 MG TABLET    Take 1 tablet (50 mg total) by mouth 3 (three) times daily.    OXYCODONE (ROXICODONE) 15 MG TAB    TAKE 1 PO AT NIGHT PRN PAIN AND 1/3 PILL DAILY PRN PAIN    POLYETHYLENE GLYCOL (GLYCOLAX) 17 GRAM/DOSE POWDER    Mix 1 capful (17 grams total) with a liquid and take by mouth once daily.    TIOTROPIUM (SPIRIVA) 18 MCG INHALATION CAPSULE    Inhale 1 capsule (18 mcg total) into the lungs via handihaler once daily. Controller       ASSESSMENT AND PLAN:    Problem List Items Addressed This Visit       Lung cancer, main bronchus, left (Chronic)    Overview     8/30/2019 underwent bronchoscopy that showed "A partially obstructing (about 80% obstructed) mass was found in the middle portion in the left mainstem bronchus. The mass was large and bloody, circumferential, endobronchial, friable and necrotic. 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  No actionable mutations and PD-L1 5%.  9/19/21 staging PET CT showed "Hypermetabolic left lung mass concerning for primary pulmonary malignancy with metastatic disease involving the right 6th and 9th ribs as well as mediastinal and left supraclavicular lymph nodes."  Stage IV squamous cell carcinoma of the lung at diagnosis.    10/8/2019-10/21/2019 he received palliative chemoradiation for rib pain with carboplatin and paclitaxel.  He received 3 cycles of carboplatin and paclitaxel.  He developed anaphylactic reaction to paclitaxel.  The chest was treated with AP:PA fields and the right 9th rib was treated with anterior and posterior oblique fields at 300 cGy per fraction to 3000 cGy.   Lt chest 6X 300 10 / 10 3,000   Rt 9th rib 6X 300 10 / 10 3,000     10/31/2019-9/10/2020 he received pembrolizumab (completed 15 cycles, last dose 8/21/2020)  9/10/2020 PET CT showed progression of disease.  He was then referred to Dr. Key for evaluation for " clinical trials.  10/20/2020 he underwent repeat biopsy for LUNG MAP trial and was randomized to Ramucirumab + pembrolizumab.    11/17/2020 started ramucirumab+pembrolizumab.  Completed 4 cycles and then 2/10/2021 scans showed progression.    2/24/2021 he was subsequent started on gemcitabine with Dr. Cruz.           Current Assessment & Plan     End stage  Consider hospice care  Went over this at length  We had a prolonged discussion about these complex clinical issues and went over the various important aspects to consider. All questions were answered.  His wife is present.  Spent 42 (40-54) minutes on patient total including: preparing for patient/charting/ordering tests/counseling and education and care coordination. (use 74177 for each 15 minutes over 69 minutes or  for medicare).                Future Appointments   Date Time Provider Department Center   5/8/2023  9:30 AM Angelita Smith NP [facility] [place]   5/19/2023 12:30 PM PRE-ADMIT, ENDO -[facility] ENDO[address]       No follow-ups on file. or sooner as needed.

## 2023-05-08 NOTE — ASSESSMENT & PLAN NOTE
End stage  Consider hospice care  Went over this at length  We had a prolonged discussion about these complex clinical issues and went over the various important aspects to consider. All questions were answered.  His wife is present.  Spent 42 (40-54) minutes on patient total including: preparing for patient/charting/ordering tests/counseling and education and care coordination. (use 31861 for each 15 minutes over 69 minutes or  for medicare).

## 2023-05-10 NOTE — TELEPHONE ENCOUNTER
5/7/2023    Patient on Palliative Care NP's schedule tomorrow. Called patient's wife Natty to setup time for appointment;however, she reports she has a doctor's appt on Monday 5/8 and she would like to reschedule appt.     Will provide wife with [phone] to call and schedule appt at her convenience    Angelita Smith DNP, FNP-C  [facility]  [phone]

## 2023-05-19 NOTE — TELEPHONE ENCOUNTER
Please advise if pt. Needs to schedule Colonoscopy. Pt.s wife wasn't sure if he could prepare. Please advise.

## 2023-05-19 NOTE — TELEPHONE ENCOUNTER
Pt. Called to curtis. colonoscopy Wife was not sure if he could prepare for this. Please advise if you want him scheduled. Thank you.

## 2023-05-22 NOTE — TELEPHONE ENCOUNTER
No care due was identified.  [facility] Embedded Care Due Messages. Reference number: [id number].   5/22/2023 9:21:15 AM CDT

## 2023-05-22 NOTE — TELEPHONE ENCOUNTER
Dr. Ruiz states defer colonoscopy for now, patient likely unable to tolerate colon preparation.     Brissa Lopez, DNP, APRN, FNP-C

## 2023-06-19 PROBLEM — A41.9 SEVERE SEPSIS: Status: RESOLVED | Noted: 2022-05-17 | Resolved: 2023-01-01

## 2023-06-19 PROBLEM — J18.9 PNEUMONIA: Status: RESOLVED | Noted: 2023-01-01 | Resolved: 2023-01-01

## 2023-06-19 PROBLEM — R65.20 SEVERE SEPSIS: Status: RESOLVED | Noted: 2022-05-17 | Resolved: 2023-01-01

## 2023-06-26 PROBLEM — J96.21 ACUTE ON CHRONIC RESPIRATORY FAILURE WITH HYPOXIA: Status: RESOLVED | Noted: 2022-01-01 | Resolved: 2023-01-01

## 2023-07-06 NOTE — TELEPHONE ENCOUNTER
Spoke with the Patient and scheduled an E[facility] appointment for 08/24/23 @ 9:00am  Patient verbally understands.

## 2023-08-21 PROBLEM — I63.9 CVA (CEREBRAL VASCULAR ACCIDENT): Status: ACTIVE | Noted: 2023-01-01

## 2023-08-21 PROBLEM — G93.41 ENCEPHALOPATHY, METABOLIC: Status: ACTIVE | Noted: 2023-01-01

## 2023-08-21 NOTE — PHARMACY MED REC
"  Admission Medication History     The home medication history was taken by Yari Esquivel CPhT.      You may go to "Admission" then "Reconcile Home Medications" tabs to review and/or act upon these items.     The home medication list has been updated by the Pharmacy department.   Please read ALL comments highlighted in yellow.   Please address this information as you see fit.    Feel free to contact us if you have any questions or require assistance.      The medications listed below were removed from the home medication list. Please reorder if appropriate:  Patient reports no longer taking the following medication(s):  Maxipime 5 %   Glycolax 17 gram/dose pwdr      Medications listed below were obtained from: Patient/family and Analytic software- [employer]  (Not in a hospital admission)        Spoke with wife of patient via telephone and she was able to provide a list of medications and last doses.    Wife stated patient has about 4 tablets of oxycodone left which one was given on yesterday.  Patient is no longer on clopidogrel (Plavix) 75 mg tab per wife.    Yari Esquivel CPhT.  [phone]                .          "

## 2023-08-21 NOTE — ED NOTES
Care assumed at this time. Pt is A&Ox4, observed resting in no acute distress. V/S stable, call bell and urinal within reach, bed in low locked position. Bedside cleaning done at this time linens changed

## 2023-08-21 NOTE — SUBJECTIVE & OBJECTIVE
Past Medical History:   Diagnosis Date    Chronic obstructive pulmonary disease with acute exacerbation 9/5/2022    Combined systolic and diastolic heart failure     Dependence on supplemental oxygen 5/24/2022    History of completed stroke 9/3/2019     09/03/2019 On CT exam    History of non-ST elevation myocardial infarction (NSTEMI) 2/22/2022    Hypertension     Lung cancer     NSCLC    Lung mass     left lung    Macrocytic anemia 8/24/2019    PAD (peripheral artery disease) 3/14/2022    LUIS FELIPE 3/11/22    Peripheral artery disease        Past Surgical History:   Procedure Laterality Date    BRONCHOSCOPY N/A 08/30/2019    Procedure: Bronchoscopy;  Surgeon: Miguel Diagnostic Provider;  Location: [address];  Service: Anesthesiology;  Laterality: N/A;    CORONARY ANGIOGRAPHY N/A 03/04/2022    Procedure: ANGIOGRAM, CORONARY ARTERY;  Surgeon: Robson Green MD;  Location: [facility] CATH LAB;  Service: Cardiology;  Laterality: N/A;    INSERTION OF TUNNELED CENTRAL VENOUS CATHETER (CVC) WITH SUBCUTANEOUS PORT      MEDIPORT REMOVAL Right 3/21/2023    Procedure: REMOVAL, CATHETER, CENTRAL VENOUS, TUNNELED, WITH PORT;  Surgeon: Eddi Hernandez MD;  Location: [facility] OR;  Service: General;  Laterality: Right;       Review of patient's allergies indicates:  No Known Allergies    Current Facility-Administered Medications on File Prior to Encounter   Medication    heparin, porcine (PF) 100 unit/mL injection flush 500 Units    sodium chloride 0.9% flush 10 mL     Current Outpatient Medications on File Prior to Encounter   Medication Sig    albuterol (VENTOLIN HFA) 90 mcg/actuation inhaler Inhale 2 puffs into the lungs every 6 (six) hours as needed for Wheezing. Rescue    albuterol-ipratropium (DUO-NEB) 2.5 mg-0.5 mg/3 mL nebulizer solution Inhale contents of 1 vial (3 mLs) by nebulization every 4 (four) hours as needed for Wheezing or Shortness of Breath. Rescue    amLODIPine (NORVASC) 10 MG tablet Take 1 tablet (10 mg total) by  mouth once daily.    apixaban (ELIQUIS) 5 mg Tab Take 1 tablet (5 mg total) by mouth 2 (two) times daily.    ascorbic acid-multivit-min (VITAMIN C ENERGY BOOSTER) 1,000 mg PwEP Take 3,000 mg by mouth once daily. Patient takes 3,000 mg per day    aspirin 81 MG Chew Take 1 tablet (81 mg total) by mouth once daily.    atorvastatin (LIPITOR) 80 MG tablet Take 1 tablet (80 mg total) by mouth once daily.    cilostazoL (PLETAL) 100 MG Tab TAKE 1 TABLET(100 MG) BY MOUTH TWICE DAILY    furosemide (LASIX) 40 MG tablet Take 1 tablet (40 mg total) by mouth 2 (two) times a day.    metoprolol tartrate (LOPRESSOR) 50 MG tablet Take 1 tablet (50 mg total) by mouth 3 (three) times daily.    oxyCODONE (ROXICODONE) 15 MG Tab TAKE 1 PO AT NIGHT PRN PAIN AND 1/3 PILL DAILY PRN PAIN    tiotropium (SPIRIVA) 18 mcg inhalation capsule Inhale 1 capsule (18 mcg total) into the lungs via handihaler once daily. Controller    cefepime in dextrose 5 % (MAXIPIME) 2 gram/50 mL PgBk Inject 50 mLs (2 g total) into the vein every 8 (eight) hours.    [DISCONTINUED] polyethylene glycol (GLYCOLAX) 17 gram/dose powder Mix 1 capful (17 grams total) with a liquid and take by mouth once daily.     Family History       Problem Relation (Age of Onset)    Cancer Father, Sister    Diabetes Mother    Heart defect Mother    No Known Problems Son          Tobacco Use    Smoking status: Former     Current packs/day: 0.00     Average packs/day: 1 pack/day for 25.0 years (25.0 ttl pk-yrs)     Types: Cigarettes     Start date: 1995     Quit date: 2020     Years since quitting: 3.6    Smokeless tobacco: Never   Substance and Sexual Activity    Alcohol use: Yes     Comment: 11/3/22: Occ.    Drug use: Never    Sexual activity: Yes     Partners: Female     Review of Systems   Constitutional:  Positive for activity change and appetite change.   HENT:  Negative for congestion and dental problem.    Eyes:  Negative for discharge and itching.   Respiratory:  Negative for apnea  and chest tightness.    Gastrointestinal:  Negative for abdominal distention and abdominal pain.   Genitourinary:  Negative for difficulty urinating and dysuria.   Musculoskeletal:  Negative for arthralgias and back pain.   Skin:  Negative for color change.   Neurological:  Positive for weakness.   Psychiatric/Behavioral:  Positive for confusion. Negative for agitation and behavioral problems.      Objective:     Vital Signs (Most Recent):  Temp: 97.7 °F (36.5 °C) (08/21/23 0702)  Pulse: 89 (08/21/23 1102)  Resp: 20 (08/21/23 1102)  BP: 119/68 (08/21/23 1102)  SpO2: 99 % (08/21/23 1102) Vital Signs (24h Range):  Temp:  [97.7 °F (36.5 °C)-98.7 °F (37.1 °C)] 97.7 °F (36.5 °C)  Pulse:  [55-96] 89  Resp:  [14-59] 20  SpO2:  [95 %-100 %] 99 %  BP: ()/(40-81) 119/68     Weight: 68 kg (150 lb)  Body mass index is 21.52 kg/m².     Physical Exam  HENT:      Nose: No congestion.   Eyes:      Extraocular Movements: Extraocular movements intact.      Pupils: Pupils are equal, round, and reactive to light.   Cardiovascular:      Rate and Rhythm: Normal rate and regular rhythm.   Pulmonary:      Effort: No respiratory distress.      Breath sounds: No wheezing.   Abdominal:      General: There is no distension.      Tenderness: There is no abdominal tenderness.   Musculoskeletal:         General: No swelling or deformity.      Cervical back: Normal range of motion.   Skin:     Coloration: Skin is not jaundiced.      Findings: No bruising.   Neurological:      Mental Status: He is alert.      Comments: Seems confused.   Psychiatric:      Comments: Seems confused.              CRANIAL NERVES     CN III, IV, VI   Pupils are equal, round, and reactive to light.       Significant Labs: All pertinent labs within the past 24 hours have been reviewed.  BMP:   Recent Labs   Lab 08/21/23  0033 08/21/23  0645   GLU  --  115*   NA  --  137   K  --  2.5*   CL  --  91*   CO2  --  32*   BUN  --  14   CREATININE  --  0.7   CALCIUM  --  8.6*    MG 1.7  --      CBC:   Recent Labs   Lab 08/20/23 2208 08/20/23 2247 08/21/23  0645   WBC  --  17.73* 16.38*   HGB  --  13.5* 13.2*   HCT 42 40.4 39.3*   PLT  --  277 266     CMP:   Recent Labs   Lab 08/20/23 2247 08/21/23  0645   * 137   K 2.4* 2.5*   CL 87* 91*   CO2 35* 32*   * 115*   BUN 17 14   CREATININE 0.8 0.7   CALCIUM 8.5* 8.6*   PROT 6.5 6.6   ALBUMIN 2.4* 2.4*   BILITOT 0.9 1.0   ALKPHOS 130 137*   AST 34 36   ALT 15 16   ANIONGAP 13 14       Significant Imaging: I have reviewed all pertinent imaging results/findings within the past 24 hours.

## 2023-08-21 NOTE — ASSESSMENT & PLAN NOTE
Antithrombotics for secondary stroke prevention: Antiplatelets: Aspirin: 81 mg daily  Anticoagulants: Apixaban 2.5 mg BID     Statins for secondary stroke prevention and hyperlipidemia, if present:   Statins: Atorvastatin- 40 mg daily    Aggressive risk factor modification: HTN, Smoking     Rehab efforts: The patient has been evaluated by a stroke team provider and the therapy needs have been fully considered based off the presenting complaints and exam findings. The following therapy evaluations are needed: PT evaluate and treat, OT evaluate and treat, SLP evaluate and treat    Diagnostics ordered/pending: CTA Head to assess vasculature     VTE prophylaxis:eliquis    BP parameters: permissive HTN

## 2023-08-21 NOTE — PROVIDER TRANSFER
Outside Transfer Acceptance Note / Regional Referral Center    Referring facility: [facility]   Referring provider: SHALA GOTTI  Accepting facility: [facility]   Accepting provider: Rey Crabtree  Admitting provider: Rey Crabtree   Reason for transfer:  Multidisciplinary Consults  Transfer diagnosis: LUE paralysis   Transfer specialty requested: Hematology and Oncology, neurology, NS, palliative care   Transfer specialty notified: No  Transfer level: NUMBER 1-5: 2  Bed type requested: Telemetry   Isolation status: No active isolations   Admission class or status: IP- Inpatient  Emergency      Narrative     The patient is a 63 y/o male with PMH of CHF, COPD, NSCLC and PAD. He has been doing well but has been off treatment for some time now. He presented with weakness and falls and now noticed flaccid paralysis of the LUE. Prior to this he was able to perform all ADLS. He was found to be hypotensive in the 80s. PO intake has been diminished as well. Labs showed: WBC 17K, K 2.4, trop 0.039. CTA head and neck done which showed: No acute abnormality. No large vessel occlusion. Right basal ganglia lacunar infarct.  Right frontal and right parietal encephalomalacia changes again seen.     Left apical neoplastic lesion with associated osseous destruction of the left 1st medial clavicle and 1st anterior rib.  Associated attenuation of the left proximal subclavian and left common carotid artery.  Pulmonary masses.     Enlarged right paratracheal lymph node.      Suspect that new LUE paralysis is related to the lesion noted on CT. Case was discussed with HM at the referring site but may need NS or neurology evaluation. Patient and family also asking about goals of care. Patient does receive his oncology care at [facility]. Last onc note from 2/28 by Dr. Higgins. Electrolytes being replaced. Stable for transfer.         Objective     Vitals: Temp: 98.7 °F (37.1 °C) (08/20/23 2129)  Pulse: 80  (08/21/23 0002)  Resp: 17 (08/21/23 0002)  BP: 94/65 (08/21/23 0002)  SpO2: 100 % (08/21/23 0002)  Recent Labs: All pertinent labs within the past 24 hours have been reviewed.  Recent imaging: see CTA    Airway:     Vent settings:         IV access:        Peripheral IV - Single Lumen 08/20/23 2131 18 G Anterior;Distal;Left Upper Arm (Active)            Peripheral IV - Single Lumen 08/21/23 0000 20 G Right Antecubital (Active)            Midline Catheter Insertion/Assessment  - Single Lumen 03/21/23 0902 Right basilic vein (medial side of arm)  (Active)     Infusions:   Allergies: Review of patient's allergies indicates:  No Known Allergies   NPO: No    Anticoagulation:   Anticoagulants       None             Instructions      Enrique Stone-  Admit to Hospital Medicine  Upon patient arrival to floor, please send SecureChat to [facility] or call extension [phone] (if no answer, do NOT leave a callback number after the beep, rather please send a SecureChat to [facility]), for Hospital Medicine admit team assignment and for additional admit orders for the patient.  Do not page the attending physician associated with the patient on arrival (this physician may not be on duty at the time of arrival).  Rather, always send a SecureChat to [facility] or call [phone] to reach the triage physician for orders and team assignment.

## 2023-08-21 NOTE — PROGRESS NOTES
CHW - Initial Contact    This Community Health Worker updated and verified the Social Determinant of Health questionnaire with patient  at bedside  today.    Pt identified barriers of most importance are: has no needs at this time.   Referrals to community agencies completed with patient/caregiver consent outside of [facility] include: none at this time.  Referrals were put through [facility] - no: none at this time.  Support and Services: has no support at this time.  Other information discussed the patient needs / wants help with: Updated and verified SDOH with patient at bedside today, patient does not have needs at this time, will follow up in one week for possible case closure.   Follow up required: yes.  Follow-up Outreach - Due: 8/27/2023

## 2023-08-21 NOTE — PT/OT/SLP EVAL
Speech Language Pathology Evaluation  Bedside Swallow    Patient Name:  Kashif Iverson   MRN:  [id number]  Admitting Diagnosis: Encephalopathy, metabolic    Recommendations:                 General Recommendations:  Dysphagia therapy and Speech language evaluation  Diet recommendations:   , Full liquids full liquids  Aspiration Precautions: 1 bite/sip at a time   General Precautions: Standard, aspiration  Communication strategies:   calm tone    Assessment:     Kashif Iverson is a 62 y.o. male with dx of Encephalopathy, metabolic (CT revealing of chronic infarcts) he presents with oral dysphagia negatively impacted by confusion.     History:     Past Medical History:   Diagnosis Date    Chronic obstructive pulmonary disease with acute exacerbation 9/5/2022    Combined systolic and diastolic heart failure     Dependence on supplemental oxygen 5/24/2022    History of completed stroke 9/3/2019     09/03/2019 On CT exam    History of non-ST elevation myocardial infarction (NSTEMI) 2/22/2022    Hypertension     Lung cancer     NSCLC    Lung mass     left lung    Macrocytic anemia 8/24/2019    PAD (peripheral artery disease) 3/14/2022    LUIS FELIPE 3/11/22    Peripheral artery disease        Past Surgical History:   Procedure Laterality Date    BRONCHOSCOPY N/A 08/30/2019    Procedure: Bronchoscopy;  Surgeon: [facility] Diagnostic Provider;  Location: [address];  Service: Anesthesiology;  Laterality: N/A;    CORONARY ANGIOGRAPHY N/A 03/04/2022    Procedure: ANGIOGRAM, CORONARY ARTERY;  Surgeon: Robson Green MD;  Location: [facility] CATH LAB;  Service: Cardiology;  Laterality: N/A;    INSERTION OF TUNNELED CENTRAL VENOUS CATHETER (CVC) WITH SUBCUTANEOUS PORT      MEDIPORT REMOVAL Right 3/21/2023    Procedure: REMOVAL, CATHETER, CENTRAL VENOUS, TUNNELED, WITH PORT;  Surgeon: Eddi Hernandez MD;  Location: [facility] OR;  Service: General;  Laterality: Right;       Social History: Patient lives with family    Chest X-Rays: (Pt with known lung CA)  " 8/20/23 Similar abnormal left lung aeration when compared with 02/28/2023, likely related to postoperative changes and scarring.  Residual malignancy is difficult to exclude given abnormal findings on prior CTA chest. Multiple bilateral pulmonary nodules, many of which are larger and more conspicuous when compared with prior study.  Metastatic disease remains in the differential.Interval improvement/resolution of previously seen consolidation in the right lung base.Three new hyperdense foci in the left axillary region and left upper extremity.  Suggest correlation for possibility foreign bodies versus overlapping structures or calcifications.    Prior diet: unrestricted     Occupation/hobbies/homemaking: Pt unable to report    Subjective   Pt able to follow most commands however was not readily complaint with ST tasks. Confusion appears to negatively impact behavior in his current state. For example exclaimed "takes like shit" spit out puree trials and refused further po despite encouragement and given rationale for ST eval. He is awaiting transfer to [facility]  Patient goals: Pt unable to report    Pain/Comfort:  Pain Rating 1: 0/10    Respiratory Status: Room air    Objective:     Oral Musculature Evaluation  Oral Musculature: unable to assess due to poor participation/comprehension  Dentition: edentulous  Secretion Management: other (see comments) (spitting secretions)  Mucosal Quality: adequate  Voice Prior to PO Intake: wfl  Oral Musculature Comments: difficult to assess as Pt was non complaint with tasks    Bedside Swallow Eval:   Consistencies Assessed:  Thin liquids X5 via straw    X1 puree    Oral Phase:   Volitionally expelled puree trial  Accepting very little    Pharyngeal Phase:   no overt clinical signs/symptoms of aspiration    Compensatory Strategies  None    Treatment: please note silent aspiration cannot be r/o at bedside.     Goals:   Multidisciplinary Problems       SLP Goals          Problem: " SLP    Goal Priority Disciplines Outcome   SLP Goal    Low SLP Ongoing, Progressing   Description: STGS  Pt will participate in ongoing assessment of swallow  Pt will participate in cognitive linguistic eval                       Plan:     Patient to be seen:  4 x/week   Plan of Care expires:  08/31/23  Plan of Care reviewed with:  patient   SLP Follow-Up:  Yes       Discharge recommendations:    TBD  Barriers to Discharge:  Level of Skilled Assistance Needed .    Time Tracking:     SLP Treatment Date:   08/21/23  Speech Start Time:  1532  Speech Stop Time:  1540     Speech Total Time (min):  8 min    Billable Minutes: Eval Swallow and Oral Function 8    08/21/2023

## 2023-08-21 NOTE — ED PROVIDER NOTES
Encounter Date: 8/20/2023    SCRIBE #1 NOTE: I, Loan Huston, am scribing for, and in the presence of,  Dr. Sidney Johnson. I have scribed the following portions of the note - Other sections scribed: HPI/ROS.       History     Chief Complaint   Patient presents with    Fall     Pt arrived via ems, pt chief complaint is a fall. Pt had a fall yesterday and has not been eating or drinking for past few days per ems. Pt was initially hypotensive 80/40 and heart rate ranging 40-60 irregular.       Kashif Iverson is a 62 y.o. male, with a PMHx of PAD, CHF, COPD, non-small cell lung cancer who presents to the ED s/p fall on yesterday. Pt family reports pt slipped and fell in bathroom yesterday and afterwards was walking fine. EMS reports hypotensive bp 80/40 and heart rate 40-60 irregular. Pt given 500ml of NS per EMS. Pt placed on 2L of O2. On Today pt can't stand on his own, unable to ambulate. Pt reports leg and back pain. Leg pain is exacerbated with movement. Pt reports noticing flaccid left arm on yesterday. Patient denies lightheadedness, dizziness, or other associated symptoms. This is the extent of the patient's complaints today in the Emergency Department.          The history is provided by the patient and a significant other.     Review of patient's allergies indicates:  No Known Allergies  Past Medical History:   Diagnosis Date    Chronic obstructive pulmonary disease with acute exacerbation 9/5/2022    Combined systolic and diastolic heart failure     Dependence on supplemental oxygen 5/24/2022    History of completed stroke 9/3/2019     09/03/2019 On CT exam    History of non-ST elevation myocardial infarction (NSTEMI) 2/22/2022    Hypertension     Lung cancer     NSCLC    Lung mass     left lung    Macrocytic anemia 8/24/2019    PAD (peripheral artery disease) 3/14/2022    LUIS FELIPE 3/11/22    Peripheral artery disease      Past Surgical History:   Procedure Laterality Date    BRONCHOSCOPY N/A 08/30/2019     Procedure: Bronchoscopy;  Surgeon: Miguel Diagnostic Provider;  Location: [facility] OR [address] [place] [address];  Service: Anesthesiology;  Laterality: N/A;    CORONARY ANGIOGRAPHY N/A 03/04/2022    Procedure: ANGIOGRAM, CORONARY ARTERY;  Surgeon: Robson Green MD;  Location: [facility] CATH LAB;  Service: Cardiology;  Laterality: N/A;    INSERTION OF TUNNELED CENTRAL VENOUS CATHETER (CVC) WITH SUBCUTANEOUS PORT      MEDIPORT REMOVAL Right 3/21/2023    Procedure: REMOVAL, CATHETER, CENTRAL VENOUS, TUNNELED, WITH PORT;  Surgeon: Eddi Hernandez MD;  Location: [facility] OR;  Service: General;  Laterality: Right;     Family History   Problem Relation Age of Onset    Diabetes Mother     Heart defect Mother     Cancer Father     Cancer Sister     No Known Problems Son      Social History     Tobacco Use    Smoking status: Former     Current packs/day: 0.00     Average packs/day: 1 pack/day for 25.0 years (25.0 ttl pk-yrs)     Types: Cigarettes     Start date: 1995     Quit date: 2020     Years since quitting: 3.6    Smokeless tobacco: Never   Substance Use Topics    Alcohol use: Yes     Comment: 11/3/22: Occ.    Drug use: Never     Review of Systems   Constitutional: Negative.    HENT: Negative.     Eyes: Negative.    Respiratory: Negative.     Cardiovascular: Negative.    Gastrointestinal: Negative.    Endocrine: Negative.    Genitourinary: Negative.    Musculoskeletal:  Positive for back pain.        Positive for bilateral leg pain.   Skin: Negative.    Neurological: Negative.  Negative for dizziness and light-headedness.        Positive for flaccid left arm.   Hematological: Negative.    Psychiatric/Behavioral: Negative.         Physical Exam     Initial Vitals [08/20/23 2129]   BP Pulse Resp Temp SpO2   (!) 100/40 (!) 55 18 98.7 °F (37.1 °C) 97 %      MAP       --         Physical Exam    Nursing note and vitals reviewed.  Constitutional: He is not diaphoretic. No distress.   Cachectic and chronically ill-appearing male conversing with ease   HENT:    Head: Normocephalic and atraumatic.   Nose: Nose normal.   Mouth/Throat: No oropharyngeal exudate.   Eyes: EOM are normal. Pupils are equal, round, and reactive to light.   Neck: Neck supple. No tracheal deviation present. No JVD present.   Normal range of motion.  Cardiovascular:  Regular rhythm, normal heart sounds and intact distal pulses.           Tachycardic   Pulmonary/Chest: He is in respiratory distress (mildly tachypneic, on 3 L continuous O2 via nasal cannula.).   Abdominal: Abdomen is soft. Bowel sounds are normal. He exhibits no distension. There is no abdominal tenderness. There is no rebound and no guarding.   Musculoskeletal:         General: Tenderness (tender throughout bilateral lower extremities, mottling noted bilateral lower extremities, unable to palpate DP pulses, cap refill appears to be slightly prolonged around 5 seconds bilateral lower extremity.) present. No edema.      Cervical back: Normal range of motion and neck supple.     Neurological: He is alert.   Noted flaccid paralysis of left upper extremity with some slight flexion noted in all 5 digits of his left hand, unable to flex or extend his left wrist, unable to move left elbow or left shoulder.   Skin: Skin is warm and dry. Capillary refill takes less than 2 seconds. No rash noted. No erythema.         ED Course   Procedures  Labs Reviewed   CBC W/ AUTO DIFFERENTIAL - Abnormal; Notable for the following components:       Result Value    WBC 17.73 (*)     Hemoglobin 13.5 (*)     RDW 15.5 (*)     Immature Granulocytes 0.8 (*)     Gran # (ANC) 14.9 (*)     Immature Grans (Abs) 0.14 (*)     Mono # 1.6 (*)     Gran % 83.9 (*)     Lymph % 6.1 (*)     All other components within normal limits   COMPREHENSIVE METABOLIC PANEL - Abnormal; Notable for the following components:    Sodium 135 (*)     Potassium 2.4 (*)     Chloride 87 (*)     CO2 35 (*)     Glucose 118 (*)     Calcium 8.5 (*)     Albumin 2.4 (*)     All other components within  normal limits    Narrative:     Potassium critical result(s) called and verbal readback obtained from   [facility]  by CD4 08/20/2023 23:26   TROPONIN I - Abnormal; Notable for the following components:    Troponin I 0.039 (*)     All other components within normal limits   B-TYPE NATRIURETIC PEPTIDE - Abnormal; Notable for the following components:     (*)     All other components within normal limits   URINALYSIS, REFLEX TO URINE CULTURE - Abnormal; Notable for the following components:    Protein, UA Trace (*)     All other components within normal limits    Narrative:     Specimen Source->Urine   ISTAT PROCEDURE - Abnormal; Notable for the following components:    POC Glucose 129 (*)     POC Sodium 134 (*)     POC Potassium 2.2 (*)     POC Chloride 86 (*)     POC TCO2 (MEASURED) 37 (*)     All other components within normal limits   CULTURE, BLOOD   CULTURE, BLOOD   LACTIC ACID, PLASMA   AMMONIA   PROTIME-INR   MAGNESIUM   PHOSPHORUS   CBC W/ AUTO DIFFERENTIAL   COMPREHENSIVE METABOLIC PANEL   ISTAT CHEM8          Imaging Results              X-Ray Chest 1 View (Final result)  Result time 08/20/23 23:55:49      Final result by Shaun Salazar MD (08/20/23 23:55:49)                   Impression:      Similar abnormal left lung aeration when compared with 02/28/2023, likely related to postoperative changes and scarring.  Residual malignancy is difficult to exclude given abnormal findings on prior CTA chest.    Multiple bilateral pulmonary nodules, many of which are larger and more conspicuous when compared with prior study.  Metastatic disease remains in the differential.    Interval improvement/resolution of previously seen consolidation in the right lung base.    Three new hyperdense foci in the left axillary region and left upper extremity.  Suggest correlation for possibility foreign bodies versus overlapping structures or calcifications.      Electronically signed by: Shaun Salazar  "MD  Date:    08/20/2023  Time:    23:55               Narrative:    EXAMINATION:  XR CHEST 1 VIEW    CLINICAL HISTORY:  Provided history is "  Unspecified fall, initial encounter".    TECHNIQUE:  One view of the chest.    COMPARISON:  Chest radiograph, 03/15/2023 and 02/28/2023.    FINDINGS:  Significant volume loss in the left lung potentially related to postoperative changes and scarring, with persistent soft tissue opacities throughout a majority of the left hemithorax.  Left lung aeration is similar when compared with 02/28/2023 allowing for differences in technique and positioning.  There are multiple bilateral pulmonary nodules, some of which are larger or more conspicuous when compared with the prior exam dated 03/15/2023.  These nodules are better evaluated on prior CTA chest dated 03/16/2023.  Interval improvement/resolution of right lung base consolidation.  Right-sided port catheter is no longer seen.  Left hemidiaphragm is mildly elevated.  No large pleural effusion.  No distinct pneumothorax.  Mild gaseous distention of bowel in the partially visualized upper abdomen.  New nonspecific punctate hyperdense foci in the left axillary region/left upper extremity.                                       X-Ray Pelvis Routine AP (Final result)  Result time 08/20/23 23:53:02      Final result by Delonte King MD (08/20/23 23:53:02)                   Impression:      No acute process.      Electronically signed by: Delonte King MD  Date:    08/20/2023  Time:    23:53               Narrative:    EXAMINATION:  XR PELVIS ROUTINE AP    CLINICAL HISTORY:  fall;    TECHNIQUE:  AP view of the pelvis was performed.    COMPARISON:  CT scan of the chest abdomen pelvis dated 08/25/2022.    FINDINGS:  There is demineralization of the osseous structures.  There is no cortical step-off.  There is no evidence of periostitis.    The joint spaces are maintained.  The soft tissues are unremarkable.  No radiopaque foreign body is " identified.    There is no evidence of a fracture or dislocation.                                        CTA Head and Neck (xpd) (Final result)  Result time 08/20/23 23:29:51      Final result by Veda Sullivan MD (08/20/23 23:29:51)                   Impression:      No acute abnormality. No large vessel occlusion. Right basal ganglia lacunar infarct.  Right frontal and right parietal encephalomalacia changes again seen.    Left apical neoplastic lesion with associated osseous destruction of the left 1st medial clavicle and 1st anterior rib.  Associated attenuation of the left proximal subclavian and left common carotid artery.  Pulmonary masses.    Enlarged right paratracheal lymph node.    This report was flagged in Epic as abnormal.      Electronically signed by: Veda Sullivan  Date:    08/20/2023  Time:    23:29               Narrative:    EXAMINATION:  CTA HEAD AND NECK (XPD)    CLINICAL HISTORY:  Status post fall yesterday.  Hypotensive and bradycardic.  Unable to ambulate.    TECHNIQUE:  Non contrast low dose axial images were obtained through the head. CT angiogram was performed from the level of the jonnathan to the top of the head following the IV administration of 80mL of Omnipaque 350.   Sagittal and coronal reconstructions and maximum intensity projection reconstructions were performed. Arterial stenosis percentages are based on NASCET measurement criteria.    COMPARISON:  03/16/2023    FINDINGS:  Intracranial Compartment:    Stable cerebral atrophy and chronic small vessel ischemic changes.  Encephalomalacia changes are again seen in the right parietal lobes.  There is a right basal ganglia remote lacunar infarct.  No extra-axial blood or fluid collections.    The brain parenchyma appears normal. No parenchymal mass, hemorrhage, edema, or major vascular distribution infarct.    Skull/Extracranial Contents (limited evaluation): No fracture. Markedly large heterogeneous left apical neoplastic  lesion with adjacent osseous destruction.  Mass measures approximately 9 x 9 cm (series 8 axial image 146).  There is bony destruction of the medial clavicle and the anterior 1st left rib.  Mass effect is exerted on the left thyroid gland and left airway.  There are additional pulmonary nodules.  A representative node at the left apex measures 16 x 12 mm (series 8 axial image 44).  A representative lymph node from the right upper lobe measures approximately 11 mm (axial image 12).  There is marked left apical pleural thickening.  There prominent collateral vessels in the upper chest left greater than right.  There is a right paratracheal lymph node measuring approximately 3 x 2.7 cm (series 8 axial image 47).    Non-Vascular Structures of the Neck/Thoracic Inlet (limited evaluation): Normal.    Aorta: 3 vessel arch.  There is attenuation irregularity of the left proximal subclavian and left common carotid artery.    Extracranial carotid circulation: No hemodynamically significant stenosis, aneurysmal dilatation, or dissection.  Atherosclerotic changes are seen at the carotid bulbs without any significant stenosis.    Extracranial vertebral circulation: No hemodynamically significant stenosis, aneurysmal dilatation, or dissection.    Intracranial Arteries: No focal high-grade stenosis, occlusion, or aneurysm.  Advanced vascular calcifications are seen involving the supraclinoid portions of the internal carotid arteries.    Venous structures (limited evaluation): Normal.                                       Medications   piperacillin-tazobactam (ZOSYN) 4.5 g in dextrose 5 % in water (D5W) 100 mL IVPB (MB+) (0 g Intravenous Stopped 8/21/23 0027)   potassium chloride 10 mEq in 100 mL IVPB (has no administration in time range)   iohexoL (OMNIPAQUE 350) injection 80 mL (80 mLs Intravenous Given 8/20/23 2232)   potassium chloride 10 mEq in 100 mL IVPB (0 mEq Intravenous Stopped 8/21/23 0120)   magnesium sulfate 2g in  water 50mL IVPB (premix) (2 g Intravenous New Bag 8/21/23 0123)   vancomycin 1,250 mg in dextrose 5 % (D5W) 250 mL IVPB (Vial-Mate) (0 mg Intravenous Stopped 8/21/23 0201)   sodium chloride 0.9% bolus 1,000 mL 1,000 mL (0 mLs Intravenous Stopped 8/21/23 0105)     Medical Decision Making  Amount and/or Complexity of Data Reviewed  Labs: ordered.  Radiology: ordered.    Risk  Prescription drug management.        MDM:    62-year-old male with past medical history as noted above presenting with fall.  Physical exam as noted above.  ED workup notable for CBC white blood cell count 17.73, hemoglobin 13.5, CMP with potassium 2.4, creatinine 0.8, troponin 0.039, BNP 1 6 9, ammonia 24, INR 1.1, urinalysis unremarkable, chest x-ray shows similar abnormal lung aeration, multiple bilateral pulmonary nodules, additional findings as noted above.  X-ray pelvis is unremarkable, CTA head and neck shows left apical neoplastic lesion with associated osseous destruction of left 1st medial clavicle and 1st anterior rib.  Patient presentation consistent with significant hypokalemia and hypomagnesemia, will place and may be contributing some to his weakness.  Additional new finding of left upper extremity flaccid paralysis present with patient able to move his digits appears somewhat inconsistent with a central process, with his notable neoplastic lesion in osseous destruction suspect this is causing a brachial plexopathy.  Patient is currently at end-stage lung cancer, has not been on treatment for some time now but was previously functioning independently.  Given these new findings, family wishes to pursue possible options.  Discussed further with transfer center and patient will be transferred for further consultation with Neurology, Oncology and palliative Care was consulted.  Advised patient and wife at bedside regarding plan further care.  All questions were answered and patient stable for transfer at this time.          Scribe  Attestation:   Scribe #1: I performed the above scribed service and the documentation accurately describes the services I performed. I attest to the accuracy of the note.                   Medical Decision Making:   History:   Old Medical Records: I decided to obtain old medical records.  Clinical Tests:   Lab Tests: Ordered and Reviewed  Radiological Study: Ordered and Reviewed      Clinical Impression:   Final diagnoses:  [W19.XXXA] Fall  [G54.0, C80.1] Brachial plexopathy due to malignant neoplasm (Primary)        ED Disposition Condition    Transfer to Another Facility Stable           I, Sidney Johnson M.D., personally performed the services described in this documentation. All medical record entries made by the scribe were at my direction and in my presence. I have reviewed the chart and agree that the record reflects my personal performance and is accurate and complete.      Sidney Johnson MD  08/21/23 3095

## 2023-08-21 NOTE — ED TRIAGE NOTES
Fall (Pt arrived via ems, pt chief complaint is a fall. Pt had a fall yesterday and has not been eating or drinking for past few days per ems. Pt was initially hypotensive 80/40 and heart rate ranging 40-60 irregular. Pt has received 500ml of NS from EMS. Pt placed on 2L of O2.

## 2023-08-21 NOTE — ASSESSMENT & PLAN NOTE
Will monitor,CTA head with   No acute abnormality. No large vessel occlusion. Right basal ganglia lacunar infarct.  Right frontal and right parietal encephalomalacia changes again seen.   PT,OT,ST ,neuro consult

## 2023-08-21 NOTE — ED PROVIDER NOTES
UPDATE  I assumed care of this patient at the change of shift while pending transfer to [facility] for inpatient hospital medicine.  For full H and P please see previous ED provider note.    On my assessment, vitals are reassuring.  Patient has no complaints at this time.  He appears to be nontoxic in no respiratory distress.    Per chart review, patient was to be transferred to [facility] for inpatient hospital medicine.  Per nursing staff, this process has been delayed given that there are currently no beds at [facility].  I discussed this case with the transfer center, Melany, who did state that they are currently no beds and she does not have a known ETA of when they would have available beds.  I also discussed this case with the hospital medicine nurse supervisor, Montrell, who also contacted [facility] and was unable to be provided with an ETA of when the patient would have a bed or transported to [facility].    Given patient has been in the ED for greater than 12 hours with delay his transfer to [facility], case was discussed with Hospital Medicine, Dr. Nava, who will see the patient. Patient is aware and agreeable.     Santa Davidson D.O  EMERGENCY MEDICINE   11:22 AM 08/21/2023      Santa Davidson, DO  08/21/23 1127

## 2023-08-21 NOTE — ED NOTES
Call received from transfer center at this time. Antonio Tucker pts bed is still pending at [facility].

## 2023-08-21 NOTE — HPI
Kashif Iverson is a 62 y.o. male, with a PMHx of PAD, diastolic CHF, COPD, non-small cell lung cancer who presents to the ED s/p fall on yesterday. Pt family reports pt slipped and fell in bathroom yesterday and afterwards was walking fine. EMS reports hypotensive bp 80/40 and heart rate 40-60 irregular. Pt given 500ml of NS per EMS. Pt placed on 2L of O2. On Today pt can't stand on his own, unable to ambulate. Pt reports leg and back pain. Leg pain is exacerbated with movement. Pt reports noticing flaccid left arm on yesterday. Patient denies lightheadedness, dizziness, or other associated symptoms. This is the extent of the patient's complaints today in the Emergency Department.CTA head,neck show,Right basal ganglia lacunar infarct.  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  Associated attenuation of the left proximal subclavian and left common carotid artery.  Pulmonary masses.   patient has severe  hypokalemia 2.4,being replaced po and IV,patient seems confused.transfer center trying sending patient to [facility] for multidisciplinary team.pending transfer,

## 2023-08-21 NOTE — H&P
[facility]  [facility] Medicine  History & Physical    Patient Name: Kashif Iverson  MRN: [id number]  Patient Class: IP- Inpatient  Admission Date: 8/20/2023  Attending Physician:Valente Nava      Primary Care Provider: Eduardo Ruiz MD         Patient information was obtained from patient and ER records.     Subjective:     Principal Problem:Encephalopathy, metabolic    Chief Complaint:   Chief Complaint   Patient presents with    Fall     Pt arrived via ems, pt chief complaint is a fall. Pt had a fall yesterday and has not been eating or drinking for past few days per ems. Pt was initially hypotensive 80/40 and heart rate ranging 40-60 irregular.          HPI: Kashif Iverson is a 62 y.o. male, with a PMHx of PAD, diastolic CHF, COPD, non-small cell lung cancer who presents to the ED s/p fall on yesterday. Pt family reports pt slipped and fell in bathroom yesterday and afterwards was walking fine. EMS reports hypotensive bp 80/40 and heart rate 40-60 irregular. Pt given 500ml of NS per EMS. Pt placed on 2L of O2. On Today pt can't stand on his own, unable to ambulate. Pt reports leg and back pain. Leg pain is exacerbated with movement. Pt reports noticing flaccid left arm on yesterday. Patient denies lightheadedness, dizziness, or other associated symptoms. This is the extent of the patient's complaints today in the Emergency Department.CTA head,neck show,Right basal ganglia lacunar infarct.  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  Associated attenuation of the left proximal subclavian and left common carotid artery.  Pulmonary masses.   patient has severe  hypokalemia 2.4,being replaced po and IV,patient seems confused.transfer center trying sending patient to [facility] for multidisciplinary team.pending transfer,      Past Medical History:    Diagnosis Date    Chronic obstructive pulmonary disease with acute exacerbation 9/5/2022    Combined systolic and diastolic heart failure     Dependence on supplemental oxygen 5/24/2022    History of completed stroke 9/3/2019     09/03/2019 On CT exam    History of non-ST elevation myocardial infarction (NSTEMI) 2/22/2022    Hypertension     Lung cancer     NSCLC    Lung mass     left lung    Macrocytic anemia 8/24/2019    PAD (peripheral artery disease) 3/14/2022    LUIS FELIPE 3/11/22    Peripheral artery disease        Past Surgical History:   Procedure Laterality Date    BRONCHOSCOPY N/A 08/30/2019    Procedure: Bronchoscopy;  Surgeon: Miguel Diagnostic Provider;  Location: [facility] OR [address] [place] [address];  Service: Anesthesiology;  Laterality: N/A;    CORONARY ANGIOGRAPHY N/A 03/04/2022    Procedure: ANGIOGRAM, CORONARY ARTERY;  Surgeon: Robson Green MD;  Location: [facility] CATH LAB;  Service: Cardiology;  Laterality: N/A;    INSERTION OF TUNNELED CENTRAL VENOUS CATHETER (CVC) WITH SUBCUTANEOUS PORT      MEDIPORT REMOVAL Right 3/21/2023    Procedure: REMOVAL, CATHETER, CENTRAL VENOUS, TUNNELED, WITH PORT;  Surgeon: Eddi Hernandez MD;  Location: [facility] OR;  Service: General;  Laterality: Right;       Review of patient's allergies indicates:  No Known Allergies    Current Facility-Administered Medications on File Prior to Encounter   Medication    heparin, porcine (PF) 100 unit/mL injection flush 500 Units    sodium chloride 0.9% flush 10 mL     Current Outpatient Medications on File Prior to Encounter   Medication Sig    albuterol (VENTOLIN HFA) 90 mcg/actuation inhaler Inhale 2 puffs into the lungs every 6 (six) hours as needed for Wheezing. Rescue    albuterol-ipratropium (DUO-NEB) 2.5 mg-0.5 mg/3 mL nebulizer solution Inhale contents of 1 vial (3 mLs) by nebulization every 4 (four) hours as needed for Wheezing or Shortness of Breath. Rescue    amLODIPine (NORVASC) 10 MG tablet Take 1 tablet (10 mg total) by mouth  once daily.    apixaban (ELIQUIS) 5 mg Tab Take 1 tablet (5 mg total) by mouth 2 (two) times daily.    ascorbic acid-multivit-min (VITAMIN C ENERGY BOOSTER) 1,000 mg PwEP Take 3,000 mg by mouth once daily. Patient takes 3,000 mg per day    aspirin 81 MG Chew Take 1 tablet (81 mg total) by mouth once daily.    atorvastatin (LIPITOR) 80 MG tablet Take 1 tablet (80 mg total) by mouth once daily.    cilostazoL (PLETAL) 100 MG Tab TAKE 1 TABLET(100 MG) BY MOUTH TWICE DAILY    furosemide (LASIX) 40 MG tablet Take 1 tablet (40 mg total) by mouth 2 (two) times a day.    metoprolol tartrate (LOPRESSOR) 50 MG tablet Take 1 tablet (50 mg total) by mouth 3 (three) times daily.    oxyCODONE (ROXICODONE) 15 MG Tab TAKE 1 PO AT NIGHT PRN PAIN AND 1/3 PILL DAILY PRN PAIN    tiotropium (SPIRIVA) 18 mcg inhalation capsule Inhale 1 capsule (18 mcg total) into the lungs via handihaler once daily. Controller    cefepime in dextrose 5 % (MAXIPIME) 2 gram/50 mL PgBk Inject 50 mLs (2 g total) into the vein every 8 (eight) hours.    [DISCONTINUED] polyethylene glycol (GLYCOLAX) 17 gram/dose powder Mix 1 capful (17 grams total) with a liquid and take by mouth once daily.     Family History       Problem Relation (Age of Onset)    Cancer Father, Sister    Diabetes Mother    Heart defect Mother    No Known Problems Son          Tobacco Use    Smoking status: Former     Current packs/day: 0.00     Average packs/day: 1 pack/day for 25.0 years (25.0 ttl pk-yrs)     Types: Cigarettes     Start date: 1995     Quit date: 2020     Years since quitting: 3.6    Smokeless tobacco: Never   Substance and Sexual Activity    Alcohol use: Yes     Comment: 11/3/22: Occ.    Drug use: Never    Sexual activity: Yes     Partners: Female     Review of Systems   Constitutional:  Positive for activity change and appetite change.   HENT:  Negative for congestion and dental problem.    Eyes:  Negative for discharge and itching.   Respiratory:  Negative  for apnea and chest tightness.    Gastrointestinal:  Negative for abdominal distention and abdominal pain.   Genitourinary:  Negative for difficulty urinating and dysuria.   Musculoskeletal:  Negative for arthralgias and back pain.   Skin:  Negative for color change.   Neurological:  Positive for weakness.   Psychiatric/Behavioral:  Positive for confusion. Negative for agitation and behavioral problems.      Objective:     Vital Signs (Most Recent):  Temp: 97.7 °F (36.5 °C) (08/21/23 0702)  Pulse: 89 (08/21/23 1102)  Resp: 20 (08/21/23 1102)  BP: 119/68 (08/21/23 1102)  SpO2: 99 % (08/21/23 1102) Vital Signs (24h Range):  Temp:  [97.7 °F (36.5 °C)-98.7 °F (37.1 °C)] 97.7 °F (36.5 °C)  Pulse:  [55-96] 89  Resp:  [14-59] 20  SpO2:  [95 %-100 %] 99 %  BP: ()/(40-81) 119/68     Weight: 68 kg (150 lb)  Body mass index is 21.52 kg/m².     Physical Exam  HENT:      Nose: No congestion.   Eyes:      Extraocular Movements: Extraocular movements intact.      Pupils: Pupils are equal, round, and reactive to light.   Cardiovascular:      Rate and Rhythm: Normal rate and regular rhythm.   Pulmonary:      Effort: No respiratory distress.      Breath sounds: No wheezing.   Abdominal:      General: There is no distension.      Tenderness: There is no abdominal tenderness.   Musculoskeletal:         General: No swelling or deformity.      Cervical back: Normal range of motion.   Skin:     Coloration: Skin is not jaundiced.      Findings: No bruising.   Neurological:      Mental Status: He is alert.      Comments: Seems confused.   Psychiatric:      Comments: Seems confused.              CRANIAL NERVES     CN III, IV, VI   Pupils are equal, round, and reactive to light.       Significant Labs: All pertinent labs within the past 24 hours have been reviewed.  BMP:   Recent Labs   Lab 08/21/23  0033 08/21/23  0645   GLU  --  115*   NA  --  137   K  --  2.5*   CL  --  91*   CO2  --  32*   BUN  --  14   CREATININE  --  0.7   CALCIUM   --  8.6*   MG 1.7  --      CBC:   Recent Labs   Lab 08/20/23  2208 08/20/23 2247 08/21/23  0645   WBC  --  17.73* 16.38*   HGB  --  13.5* 13.2*   HCT 42 40.4 39.3*   PLT  --  277 266     CMP:   Recent Labs   Lab 08/20/23 2247 08/21/23  0645   * 137   K 2.4* 2.5*   CL 87* 91*   CO2 35* 32*   * 115*   BUN 17 14   CREATININE 0.8 0.7   CALCIUM 8.5* 8.6*   PROT 6.5 6.6   ALBUMIN 2.4* 2.4*   BILITOT 0.9 1.0   ALKPHOS 130 137*   AST 34 36   ALT 15 16   ANIONGAP 13 14       Significant Imaging: I have reviewed all pertinent imaging results/findings within the past 24 hours.    Assessment/Plan:     * Encephalopathy, metabolic  Will monitor,CTA head with   No acute abnormality. No large vessel occlusion. Right basal ganglia lacunar infarct.  Right frontal and right parietal encephalomalacia changes again seen.   PT,OT,ST ,neuro consult     CVA (cerebral vascular accident)    Antithrombotics for secondary stroke prevention: Antiplatelets: Aspirin: 81 mg daily  Anticoagulants: Apixaban 2.5 mg BID     Statins for secondary stroke prevention and hyperlipidemia, if present:   Statins: Atorvastatin- 40 mg daily    Aggressive risk factor modification: HTN, Smoking     Rehab efforts: The patient has been evaluated by a stroke team provider and the therapy needs have been fully considered based off the presenting complaints and exam findings. The following therapy evaluations are needed: PT evaluate and treat, OT evaluate and treat, SLP evaluate and treat    Diagnostics ordered/pending: CTA Head to assess vasculature     VTE prophylaxis:eliquis    BP parameters: permissive HTN       Squamous cell carcinoma of lung    advanced,consulted oncology and  And palliative.    Persistent atrial fibrillation  On OAC,will monitor.    COPD (chronic obstructive pulmonary disease)  Started on nebulizer,stable on NC O 2.      Coronary artery disease involving native coronary artery of native heart without angina pectoris  On medical  treatments,      PAD (peripheral artery disease)  On medical treatments.      Chronic diastolic heart failure  Will monitor.      Bronchiectasis without complication  Will monitor while is on IV Abx.      Atherosclerosis of aorta  On medical treatments.      Secondary malignant neoplasm of bone  advanced,consulted oncology and  And palliative.        VTE Risk Mitigation (From admission, onward)         Ordered     apixaban tablet 5 mg  2 times daily         08/21/23 1220                           Valente Nava MD  Department of Hospital Medicine  [facility] - Emergency Dept

## 2023-08-21 NOTE — CONSULTS
Consult received; our team is familiar with pt from prior hospital stays. As both oncology and neurology consults are pending, will see patient after these have been completed. Full consult to follow once patient is seen tomorrow.     DELPHINE Granados  Palliative Medicine Staff   [phone]

## 2023-08-22 PROBLEM — R29.898 LEFT ARM WEAKNESS: Status: ACTIVE | Noted: 2023-01-01

## 2023-08-22 NOTE — HPI
62 y.o. male with HTN, PAD, CAD, Afib (Eliquis), HF, COPD, lung squamous cell carcinoma with bony mets s/p palliative chemoradiation presented to [facility] 8/21/23 after a fall the day prior to arrival. Family reported decreased PO intake days leading up to fall. Pt reportedly slipped and fell in the bathroom. Fall was unwitnessed so wife unaware of mechanism of fall. He was complaining of LUE pain following fall and noted to have weakness. Wife also concerned about an episode of urinary incontinence, which is a new issue as well. On EMS arrival, he was hypotensive and unable to stand unassisted or ambulate. He was complaining of back, leg pain and new LUE flaccid paralysis. CTA head/neck showed remote R basal ganglia lacunar infarct and R frontal/parietal encephalomalacia. L apical neoplastic lesion causing osseous destruction to left 1st medial clavicle, 1st anterior rib and pulmonary masses. Labs remarkable for critical hypokalemia (2.5) that improved with repletion. Neurology consulted (8/22/23) for LUE paralysis. Prior to this fall, wife reports patient was independent and ambulatory. Now he will not stretch out his legs due to back pain and only wiggling his fingers on left hand.

## 2023-08-22 NOTE — NURSING
Nurses Note -- 4 Eyes      8/21/2023   10:23 PM      Skin assessed during: Admit      [] No Altered Skin Integrity Present    []Prevention Measures Documented      [x] Yes- Altered Skin Integrity Present or Discovered   [x] LDA Added if Not in Epic (Describe Wound)   [x] New Altered Skin Integrity was Present on Admit and Documented in LDA   [] Wound Image Taken    Wound Care Consulted? Yes    Attending Nurse:  Nilsa Valdez RN/Staff Member:   Mariela FONTANA

## 2023-08-22 NOTE — PLAN OF CARE
Problem: Occupational Therapy  Goal: Occupational Therapy Goal  Description: Goals to be met by: 09-10-23     Patient will increase functional independence with ADLs by performing:    Feeding with Set-up Assistance.  UE Dressing with Set-up Assistance.  Grooming while seated with Set-up Assistance.  Toileting from bedside commode with Moderate Assistance for hygiene and clothing management.   Rolling to Bilateral with Stand-by Assistance.   Supine to sit with Minimal Assistance.  Sit to stand transfers with Moderate Assistance.  Squat pivot transfers with Moderate Assistance.      Outcome: Ongoing, Progressing

## 2023-08-22 NOTE — MEDICAL/APP STUDENT
[facility] Medicine Student   Progress Note  [facility]    Admit Date: 8/20/2023  Hospital Day: 1  08/22/2023  9:31 AM    SUBJECTIVE:   Mr. Kashif Iverson is a 62 y.o. male with a relevant medical history of PAD, diastolic CHF, COPD, non-small cell lung cancer who is being followed up for Encephalopathy, metabolic.    Reason for Consult  Metabolic encephalopathy  Profound hypokalemia (2.4 on admission, supplemented in ED  CTA head - no acute abnormality, R basal ganglia lacunar infarct (historical), R frontal & parietal encephalomalacia changes  LUE paralysis/weakness for ~1w  CNS/stroke vs. peripheral nerve (brachial plexus) injury d/t L lung mass causing bony destruction of 1st rib    Relevant PMHx  Non-small cell lung cancer  Gemcitabine q2w, last session in March  PAD  Hx of completed stroke  9/3/2019 on CT    OBJECTIVE:     Vital Signs Recent:  Temp: 98.1 °F (36.7 °C) (08/22/23 0809)  Pulse: (!) 112 (08/22/23 0809)  Resp: 17 (08/22/23 0809)  BP: 118/78 (08/22/23 0809)  SpO2: 95 % (08/22/23 0809)  Oxygen Documentation:    Flow (L/min): 2           Device (Oxygen Therapy): nasal cannula  $ Is the patient on Low Flow Oxygen?: Yes      Vital Signs Range (Last 24H):  Temp:  [97.9 °F (36.6 °C)-98.6 °F (37 °C)]   Pulse:  []   Resp:  [17-22]   BP: (106-127)/(59-88)   SpO2:  [95 %-100 %]        I & O (Last 24H):  Intake/Output Summary (Last 24 hours) at 8/22/2023 0931  Last data filed at 8/21/2023 1640  Gross per 24 hour   Intake 100 ml   Output --   Net 100 ml        Physical Exam:  Physical Exam    Labs:   Recent Labs   Lab 08/21/23  0033 08/21/23  0645 08/21/23  2229 08/22/23  0639   NA  --  137 135* 136   K  --  2.5* 4.0 3.4*   CL  --  91* 91* 96   CO2  --  32* 30* 30*   BUN  --  14 12 10   CREATININE  --  0.7 0.8 0.7   GLU  --  115* 119* 116*   CALCIUM  --  8.6* 8.8 8.7   MG 1.7  --  2.0 1.8   PHOS 2.7  --   --  1.2*     Recent Labs   Lab 08/20/23  2247 08/21/23  0645 08/22/23  0639   ALKPHOS 130 137* 122  "  ALT 15 16 12   AST 34 36 35   ALBUMIN 2.4* 2.4* 2.3*   PROT 6.5 6.6 6.1   BILITOT 0.9 1.0 0.8   INR 1.1  --  1.2     Recent Labs   Lab 08/20/23  2247 08/21/23  0645 08/22/23  0639   WBC 17.73* 16.38* 15.71*   HGB 13.5* 13.2* 12.8*   HCT 40.4 39.3* 37.6*    266 246       Scheduled Meds:   albuterol-ipratropium  3 mL Nebulization Q6H WAKE    apixaban  5 mg Oral BID    aspirin  81 mg Oral Daily    atorvastatin  80 mg Oral Daily    cilostazoL  100 mg Oral BID    metoprolol tartrate  50 mg Oral TID    OLANZapine zydis  5 mg Oral QHS    polyethylene glycol  17 g Oral Daily    potassium chloride SA  20 mEq Oral Daily    senna-docusate 8.6-50 mg  1 tablet Oral BID    tiotropium bromide  2 puff Inhalation Daily     Continuous Infusions:   dextrose 5 % and 0.45 % NaCl with KCl 40 mEq 75 mL/hr at 08/22/23 0311     PRN Meds:acetaminophen, melatonin, naloxone, ondansetron, oxyCODONE, prochlorperazine, sodium chloride 0.9%    ASSESSMENT/PLAN:   Mr. Kashif Iverson is a 62 y.o. male who is being followed up for Encephalopathy, metabolic, LUE weakness for 1 week.    LUE weakness  Concern for NSCLC progression  LUE swollen compared to RUE, since March according to chart review  Plan  MRI brachial plexus/cervical spine      Active Hospital Problems    Diagnosis  POA    *Encephalopathy, metabolic [G93.41]  Yes    CVA (cerebral vascular accident) [I63.9]  Yes    Persistent atrial fibrillation [I48.19]  Yes    COPD (chronic obstructive pulmonary disease) [J44.9]  Yes    Coronary artery disease involving native coronary artery of native heart without angina pectoris [I25.10]  Yes     Chronic    PAD (peripheral artery disease) [I73.9]  Yes     LUIS FELIPE 3/11/22      Chronic diastolic heart failure [I50.32]  Yes    Hypokalemia [E87.6]  Yes    Essential hypertension [I10]  Yes     Chronic    Bronchiectasis without complication [J47.9]  Yes     CT Chest Abdomen Pelvis-9/24/2021   "Bronchiectasis and scarring noted within " "the left lower lobe."         Secondary malignant neoplasm of bone [C79.51]  Yes     S/p palliative radiation to rib in 2019 with Dr. Bolden      Lung cancer, main bronchus, left [C34.02]  Yes     Chronic     8/30/2019 underwent bronchoscopy that showed "A partially obstructing (about 80% obstructed) mass was found in the middle portion in the left mainstem bronchus. The mass was large and bloody, circumferential, endobronchial, friable and necrotic. 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  No actionable mutations and PD-L1 5%.  9/19/21 staging PET CT showed "Hypermetabolic left lung mass concerning for primary pulmonary malignancy with metastatic disease involving the right 6th and 9th ribs as well as mediastinal and left supraclavicular lymph nodes."  Stage IV squamous cell carcinoma of the lung at diagnosis.    10/8/2019-10/21/2019 he received palliative chemoradiation for rib pain with carboplatin and paclitaxel.  He received 3 cycles of carboplatin and paclitaxel.  He developed anaphylactic reaction to paclitaxel.  The chest was treated with AP:PA fields and the right 9th rib was treated with anterior and posterior oblique fields at 300 cGy per fraction to 3000 cGy.   Lt chest 6X 300 10 / 10 3,000   Rt 9th rib 6X 300 10 / 10 3,000     10/31/2019-9/10/2020 he received pembrolizumab (completed 15 cycles, last dose 8/21/2020)  9/10/2020 PET CT showed progression of disease.  He was then referred to Dr. Key for evaluation for clinical trials.  10/20/2020 he underwent repeat biopsy for LUNG MAP trial and was randomized to Ramucirumab + pembrolizumab.    11/17/2020 started ramucirumab+pembrolizumab.  Completed 4 cycles and then 2/10/2021 scans showed progression.    2/24/2021 he was subsequent started on gemcitabine with Dr. Cruz.        Resolved Hospital Problems   No resolved problems to display.       "

## 2023-08-22 NOTE — SUBJECTIVE & OBJECTIVE
Interval History: Patient disoriented. Doesn't respond to questions appropriately. Awaiting MRI.      Review of Systems   Unable to perform ROS: Mental status change     Objective:     Vital Signs (Most Recent):  Temp: 97.7 °F (36.5 °C) (08/22/23 1131)  Pulse: 77 (08/22/23 1131)  Resp: 17 (08/22/23 1131)  BP: 95/65 (08/22/23 1131)  SpO2: 95 % (08/22/23 1131) Vital Signs (24h Range):  Temp:  [97.7 °F (36.5 °C)-98.6 °F (37 °C)] 97.7 °F (36.5 °C)  Pulse:  [] 77  Resp:  [17-22] 17  SpO2:  [92 %-100 %] 95 %  BP: ()/(65-88) 95/65     Weight: 66.5 kg (146 lb 9.7 oz)  Body mass index is 21.04 kg/m².    Intake/Output Summary (Last 24 hours) at 8/22/2023 1403  Last data filed at 8/21/2023 1640  Gross per 24 hour   Intake 100 ml   Output --   Net 100 ml         Physical Exam  HENT:      Nose: No congestion.   Eyes:      Extraocular Movements: Extraocular movements intact.      Pupils: Pupils are equal, round, and reactive to light.   Cardiovascular:      Rate and Rhythm: Normal rate and regular rhythm.   Pulmonary:      Effort: No respiratory distress.      Breath sounds: No wheezing.   Abdominal:      General: There is no distension.      Tenderness: There is no abdominal tenderness.   Musculoskeletal:         General: No swelling or deformity.      Cervical back: Normal range of motion.   Skin:     Coloration: Skin is not jaundiced.      Findings: No bruising.   Neurological:      Mental Status: He is alert. He is disoriented.             Significant Labs: All pertinent labs within the past 24 hours have been reviewed.    Significant Imaging: I have reviewed all pertinent imaging results/findings within the past 24 hours.

## 2023-08-22 NOTE — ASSESSMENT & PLAN NOTE
CTA head with no acute abnormality. No large vessel occlusion. Right basal ganglia lacunar infarct.  Right frontal and right parietal encephalomalacia changes again seen.   Ammonia WNL. Check B12, TSH, folate  Neurology consulted  Delirium precautions  MRI brain pending

## 2023-08-22 NOTE — PROGRESS NOTES
[facility] - [facility] ([address] Medicine  Progress Note    Patient Name: Kashif Iverson  MRN: [id number]  Patient Class: IP- Inpatient   Admission Date: 8/20/2023  Length of Stay: 1 days  Attending Physician: Parmjit Guevara MD  Primary Care Provider: Eduardo Ruiz MD        Subjective:     Principal Problem:Encephalopathy, metabolic        HPI:  Kashif Iverson is a 62 y.o. male, with a PMHx of PAD, diastolic CHF, COPD, non-small cell lung cancer who presents to the ED s/p fall on yesterday. Pt family reports pt slipped and fell in bathroom yesterday and afterwards was walking fine. EMS reports hypotensive bp 80/40 and heart rate 40-60 irregular. Pt given 500ml of NS per EMS. Pt placed on 2L of O2. On Today pt can't stand on his own, unable to ambulate. Pt reports leg and back pain. Leg pain is exacerbated with movement. Pt reports noticing flaccid left arm on yesterday. Patient denies lightheadedness, dizziness, or other associated symptoms. This is the extent of the patient's complaints today in the Emergency Department.CTA head,neck show,Right basal ganglia lacunar infarct.  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  Associated attenuation of the left proximal subclavian and left common carotid artery.  Pulmonary masses.   patient has severe  hypokalemia 2.4,being replaced po and IV,patient seems confused.transfer center trying sending patient to [facility] for multidisciplinary team.pending transfer,      Overview/Hospital Course:  No notes on file    Interval History: Patient disoriented. Doesn't respond to questions appropriately. Awaiting MRI.      Review of Systems   Unable to perform ROS: Mental status change     Objective:     Vital Signs (Most Recent):  Temp: 97.7 °F (36.5 °C) (08/22/23 1131)  Pulse: 77 (08/22/23 1131)  Resp: 17 (08/22/23  1131)  BP: 95/65 (08/22/23 1131)  SpO2: 95 % (08/22/23 1131) Vital Signs (24h Range):  Temp:  [97.7 °F (36.5 °C)-98.6 °F (37 °C)] 97.7 °F (36.5 °C)  Pulse:  [] 77  Resp:  [17-22] 17  SpO2:  [92 %-100 %] 95 %  BP: ()/(65-88) 95/65     Weight: 66.5 kg (146 lb 9.7 oz)  Body mass index is 21.04 kg/m².    Intake/Output Summary (Last 24 hours) at 8/22/2023 1403  Last data filed at 8/21/2023 1640  Gross per 24 hour   Intake 100 ml   Output --   Net 100 ml         Physical Exam  HENT:      Nose: No congestion.   Eyes:      Extraocular Movements: Extraocular movements intact.      Pupils: Pupils are equal, round, and reactive to light.   Cardiovascular:      Rate and Rhythm: Normal rate and regular rhythm.   Pulmonary:      Effort: No respiratory distress.      Breath sounds: No wheezing.   Abdominal:      General: There is no distension.      Tenderness: There is no abdominal tenderness.   Musculoskeletal:         General: No swelling or deformity.      Cervical back: Normal range of motion.   Skin:     Coloration: Skin is not jaundiced.      Findings: No bruising.   Neurological:      Mental Status: He is alert. He is disoriented.             Significant Labs: All pertinent labs within the past 24 hours have been reviewed.    Significant Imaging: I have reviewed all pertinent imaging results/findings within the past 24 hours.      Assessment/Plan:      * Encephalopathy, metabolic  CTA head with no acute abnormality. No large vessel occlusion. Right basal ganglia lacunar infarct.  Right frontal and right parietal encephalomalacia changes again seen.   Ammonia WNL. Check B12, TSH, folate  Neurology consulted  Delirium precautions  MRI brain pending    Left arm weakness  MRI brachial plexus  PT/OT      Squamous cell carcinoma of lung    advanced,consulted oncology and  And palliative.    Persistent atrial fibrillation  On OAC, will monitor.    COPD (chronic obstructive pulmonary disease)  Duoneb  PRN        Coronary artery disease involving native coronary artery of native heart without angina pectoris  Cont aspirin, statin      PAD (peripheral artery disease)        Chronic diastolic heart failure  No signs of decompensated HF  Cont aspirin, metoprolol      Hypokalemia  Supplement      Essential hypertension  Cont metoprolol      Bronchiectasis without complication        Atherosclerosis of aorta  On medical treatments.      Secondary malignant neoplasm of bone  Chart review shows patient not candidate for further treatment   Palliative care consulted      Lung cancer, main bronchus, left          VTE Risk Mitigation (From admission, onward)         Ordered     apixaban tablet 5 mg  2 times daily         08/21/23 1220                Discharge Planning   [place]: 8/24/2023     Code Status: Full Code   Is the patient medically ready for discharge?: No    Reason for patient still in hospital (select all that apply): Treatment, Imaging and Consult recommendations                     Parmjit Guevara MD  Department of Hospital Medicine   [facility] - Intensive Care ([place])

## 2023-08-22 NOTE — PT/OT/SLP EVAL
Occupational Therapy   Evaluation/tx    Name: Kashif Iverson  MRN: [id number]  Admitting Diagnosis: Encephalopathy, metabolic  Recent Surgery: * No surgery found *      Recommendations:     Discharge Recommendations: nursing facility, skilled  Discharge Equipment Recommendations:   (TBD)  Barriers to discharge:  Other (Comment) (requires extensive assist)    Assessment:     Kashif Iverson is a 62 y.o. male with a medical diagnosis of Encephalopathy, metabolic.  He presents with significant pain on this date. Pt. Participated well in session. Pt. Required Total A for all bed mobility and limited by pain. Patient would benefit from continued OT services to maximize level of safety and independence with self-care tasks.    Performance deficits affecting function: weakness, impaired endurance, impaired self care skills, impaired functional mobility, gait instability, impaired cognition, decreased lower extremity function, edema, pain, decreased ROM, impaired fine motor, impaired skin, decreased safety awareness.      Rehab Prognosis: Fair; patient would benefit from acute skilled OT services to address these deficits and reach maximum level of function.       Plan:     Patient to be seen 3 x/week to address the above listed problems via self-care/home management, therapeutic activities, therapeutic exercises, neuromuscular re-education, cognitive retraining  Plan of Care Expires: 09/21/23  Plan of Care Reviewed with: patient    Subjective     Chief Complaint: pain  Patient/Family Comments/goals: no specific goals stated    Occupational Profile: Poor historian  Living Environment: pt. Reportedly lives with his spouse in  house  Previous level of function: Pt. Reported walking and recent fall in bathroom  Roles and Routines: spouse; partial caretaker of self  Equipment Used at Home:  (unable to confirm)  Assistance upon Discharge: spouse potentially but needs to be confirmed    Pain/Comfort:  Pain Rating 1:  (did not  rate)  Location - Side 1: Left  Location 1: arm  Pain Addressed 1: Reposition, Distraction, Nurse notified  Pain Rating Post-Intervention 1:  (not rated)  Pain Rating 2:  (not rated)  Location 2: back  Pain Addressed 2: Reposition, Distraction, Nurse notified  Pain Rating Post-Intervention 2:  (not rated)    Patients cultural, spiritual, Druze conflicts given the current situation: no    Objective:     Communicated with: nurse prior to session.  Patient found supine with oxygen, peripheral IV (condom cath off on OT arrival) upon OT entry to room.    General Precautions: Standard, aspiration, fall  (imaging of LUE pending)   Orthopedic Precautions: N/A  Braces: N/A  Respiratory Status: Nasal cannula, flow 2 L/min    Occupational Performance:    Bed Mobility:    Patient completed Rolling/Turning to Left with  total assistance  Patient completed Rolling/Turning to Right with total assistance    Functional Mobility/Transfers:  Drawsheet x 2 to HOB Functional Mobility:     Activities of Daily Living:  Toileting: total assistance for cleaning    Cognitive/Visual Perceptual:  Cognitive/Psychosocial Skills:     -       Oriented to: Person   -       Follows Commands/attention:Inattentive  -       Communication: minimal verbalizations/confused  -       Memory: impaired  -       Safety awareness/insight to disability: impaired   -       Mood/Affect/Coping skills/emotional control: Lethargic and confused/agitated  Visual/Perceptual:      -unable to  accurately assess    Physical Exam:  Balance: -       unable to assess  Skin integrity: Bruising of LUE  Edema:  Moderate in left forearm  Upper Extremity Range of Motion:     -       Left Upper Extremity:  able to grasp lightly; tolerated wrist flex/ext but did not tolerate pron/supina due to pain so deferred     AMPAC 6 Click ADL:  AMPAC Total Score: 9    Treatment & Education:  Pt. Provided with daily orientation  Pt. Educated on role of OT and POC    Patient left supine  with all lines intact, call button in reach, bed alarm on, nurse notified, and LUE elevated    GOALS:   Multidisciplinary Problems       Occupational Therapy Goals          Problem: Occupational Therapy    Goal Priority Disciplines Outcome Interventions   Occupational Therapy Goal     OT, PT/OT Ongoing, Progressing    Description: Goals to be met by: 09-10-23     Patient will increase functional independence with ADLs by performing:    Feeding with Set-up Assistance.  UE Dressing with Set-up Assistance.  Grooming while seated with Set-up Assistance.  Toileting from bedside commode with Moderate Assistance for hygiene and clothing management.   Rolling to Bilateral with Stand-by Assistance.   Supine to sit with Minimal Assistance.  Sit to stand transfers with Moderate Assistance.  Squat pivot transfers with Moderate Assistance.                           History:     Past Medical History:   Diagnosis Date    Chronic obstructive pulmonary disease with acute exacerbation 9/5/2022    Combined systolic and diastolic heart failure     Dependence on supplemental oxygen 5/24/2022    History of completed stroke 9/3/2019     09/03/2019 On CT exam    History of non-ST elevation myocardial infarction (NSTEMI) 2/22/2022    Hypertension     Lung cancer     NSCLC    Lung mass     left lung    Macrocytic anemia 8/24/2019    PAD (peripheral artery disease) 3/14/2022    LUIS FELIPE 3/11/22    Peripheral artery disease          Past Surgical History:   Procedure Laterality Date    BRONCHOSCOPY N/A 08/30/2019    Procedure: Bronchoscopy;  Surgeon: [facility] Diagnostic Provider;  Location: [facility] OR [address] [place] [address];  Service: Anesthesiology;  Laterality: N/A;    CORONARY ANGIOGRAPHY N/A 03/04/2022    Procedure: ANGIOGRAM, CORONARY ARTERY;  Surgeon: Robson Green MD;  Location: [facility] CATH LAB;  Service: Cardiology;  Laterality: N/A;    INSERTION OF TUNNELED CENTRAL VENOUS CATHETER (CVC) WITH SUBCUTANEOUS PORT      MEDIPORT REMOVAL Right 3/21/2023    Procedure:  REMOVAL, CATHETER, CENTRAL VENOUS, TUNNELED, WITH PORT;  Surgeon: Eddi Hernandez MD;  Location: [facility] OR;  Service: General;  Laterality: Right;       Time Tracking:     OT Date of Treatment: 08/22/23  OT Start Time: 1008  OT Stop Time: 1030  OT Total Time (min): 22 min    Billable Minutes:Evaluation 14  Therapeutic Activity 8    8/22/2023

## 2023-08-22 NOTE — PT/OT/SLP PROGRESS
Physical Therapy      Patient Name:  Kashif Iverson   MRN:  [id number]    PT orders received. PT to bedside for PT evaluation. Per OT eval, pt with significant pain in UE, and increased difficulty and pain with bed level mobility. PT will continue to monitor and return when pain better controlled.

## 2023-08-22 NOTE — NURSING
Pt oriented to self wife at bed side. Scheduled for MRI with contrast spoke to the team about him possibly needing sedation for the procedure. Pt is  resting comfortably in bed chest rise and fall observed call light within reach, vitals stable, bed in lowest position, and locked.

## 2023-08-22 NOTE — ASSESSMENT & PLAN NOTE
62 y.o. male with HTN, PAD, CAD, Afib (Eliquis), HFpEF, COPD, lung cancer with bony mets s/p palliative chemoradiation presented to ED 8/21/23 after a fall the day prior to arrival. On EMS arrival, he was hypotensive and unable to stand unassisted or ambulate. He was complaining of back and leg pain and new LUE flaccid paralysis. CTA head/neck showed remote R basal ganglia lacunar infarct and R frontal/parietal encephalomalacia. L apical neoplastic lesion causing osseous destruction to left 1st medial clavicle, 1st anterior rib and pulmonary masses. Labs remarkable for critical hypokalemia (2.5) that improved with repletion. Neurology consulted (8/22/23) for LUE paralysis. Wife notes encephalopathy and urinary incontinence are new as well.     Recommendations:  --MRI Neuro-axis (brain, C/T/L spine W WO contrast) and L brachial plexus pending  --EEG to r/o subclinical seizure activity given encephalopathy and episode of incontinence  --PT/OT, fall precautions  --Heme/Onc and palliative care for goals of care discussion

## 2023-08-22 NOTE — CARE UPDATE
This is a 61 year old male with a PMHx of SCC of the lung with metastasis to the bone (s/p maintenance gemcitabine and radiation, s/p chemoradiation and immunotherapy), COPD/bronchiestasis, Afib (on Eliquis), HTN, HFpEF (EF: 60%), hx of stroke , CAD, PAD who presents as transfer from [facility].     Patient had fall in the home 2 days ago and found to be hypotensive, severe debility unable to stand and new onset near flaccid paralysis of the LUE.   Patient had CTA head & Neck with no large vessel occlusion, remote lacunar infarct of Right basal ganglia and multiple areas of encephalomalacia.     cT chest shows he has left apical neoplastic lesion causing destruction to left 1st medial clavicle and anterior rib and pulmonary masses.     Noted to have critical hypokalemia - receiving maintenance IV fluids with 40meq/Liter.  Repeat BMP tonight shows normal potassium value     He cannot provide any summary of history - tells me only he thinks LUE paralysis/weakness started on Tuesday - 1 week prior.   Based on Workup unclear if this is CNS or stroke related vs could he have peripheral nerve (brachial plexus) injury from the area of left lung mass causing bony destruction of 1st rib. .   He has weak  in the LUE only able to move some of his fingers, cannot flex or extend shoulder,    Plan  -continue orders instituted.   -obtain MRI cervical spine to rule out central spinal cord etiology of weakness  -Neurology consult to assist in w/u   -PT/OT consult  -SLP recs Full liquids   -during admit consult Oncology - is patient still a chemotherapy candidate, if not may require hospice referral prior to discharge pending further goals of care discussion with family.          CTA HEAD AND NECK (XPD)     CLINICAL HISTORY:  Status post fall yesterday.  Hypotensive and bradycardic.  Unable to ambulate.     TECHNIQUE:  Non contrast low dose axial images were obtained through the head. CT angiogram was performed  from the level of the jonnathan to the top of the head following the IV administration of 80mL of Omnipaque 350.   Sagittal and coronal reconstructions and maximum intensity projection reconstructions were performed. Arterial stenosis percentages are based on NASCET measurement criteria.     COMPARISON:  03/16/2023     FINDINGS:  Intracranial Compartment:     Stable cerebral atrophy and chronic small vessel ischemic changes.  Encephalomalacia changes are again seen in the right parietal lobes.  There is a right basal ganglia remote lacunar infarct.  No extra-axial blood or fluid collections.     The brain parenchyma appears normal. No parenchymal mass, hemorrhage, edema, or major vascular distribution infarct.     Skull/Extracranial Contents (limited evaluation): No fracture. Markedly large heterogeneous left apical neoplastic lesion with adjacent osseous destruction.  Mass measures approximately 9 x 9 cm (series 8 axial image 146).  There is bony destruction of the medial clavicle and the anterior 1st left rib.  Mass effect is exerted on the left thyroid gland and left airway.  There are additional pulmonary nodules.  A representative node at the left apex measures 16 x 12 mm (series 8 axial image 44).  A representative lymph node from the right upper lobe measures approximately 11 mm (axial image 12).  There is marked left apical pleural thickening.  There prominent collateral vessels in the upper chest left greater than right.  There is a right paratracheal lymph node measuring approximately 3 x 2.7 cm (series 8 axial image 47).     Non-Vascular Structures of the Neck/Thoracic Inlet (limited evaluation): Normal.     Aorta: 3 vessel arch.  There is attenuation irregularity of the left proximal subclavian and left common carotid artery.     Extracranial carotid circulation: No hemodynamically significant stenosis, aneurysmal dilatation, or dissection.  Atherosclerotic changes are seen at the carotid bulbs without any  significant stenosis.     Extracranial vertebral circulation: No hemodynamically significant stenosis, aneurysmal dilatation, or dissection.     Intracranial Arteries: No focal high-grade stenosis, occlusion, or aneurysm.  Advanced vascular calcifications are seen involving the supraclinoid portions of the internal carotid arteries.     Venous structures (limited evaluation): Normal.     Impression:     No acute abnormality. No large vessel occlusion. Right basal ganglia lacunar infarct.  Right frontal and right parietal encephalomalacia changes again seen.     Left apical neoplastic lesion with associated osseous destruction of the left 1st medial clavicle and 1st anterior rib.  Associated attenuation of the left proximal subclavian and left common carotid artery.  Pulmonary masses.     Enlarged right paratracheal lymph node.     This report was flagged in Epic as abnormal.        Electronically signed by: Veda Sullivan  Date:                                            08/20/2023  Time:                                           23:29

## 2023-08-22 NOTE — SUBJECTIVE & OBJECTIVE
Past Medical History:   Diagnosis Date    Chronic obstructive pulmonary disease with acute exacerbation 9/5/2022    Combined systolic and diastolic heart failure     Dependence on supplemental oxygen 5/24/2022    History of completed stroke 9/3/2019     09/03/2019 On CT exam    History of non-ST elevation myocardial infarction (NSTEMI) 2/22/2022    Hypertension     Lung cancer     NSCLC    Lung mass     left lung    Macrocytic anemia 8/24/2019    PAD (peripheral artery disease) 3/14/2022    LUIS FELIPE 3/11/22    Peripheral artery disease      Past Surgical History:   Procedure Laterality Date    BRONCHOSCOPY N/A 08/30/2019    Procedure: Bronchoscopy;  Surgeon: Miguel Diagnostic Provider;  Location: [address];  Service: Anesthesiology;  Laterality: N/A;    CORONARY ANGIOGRAPHY N/A 03/04/2022    Procedure: ANGIOGRAM, CORONARY ARTERY;  Surgeon: Robsno Green MD;  Location: [facility] CATH LAB;  Service: Cardiology;  Laterality: N/A;    INSERTION OF TUNNELED CENTRAL VENOUS CATHETER (CVC) WITH SUBCUTANEOUS PORT      MEDIPORT REMOVAL Right 3/21/2023    Procedure: REMOVAL, CATHETER, CENTRAL VENOUS, TUNNELED, WITH PORT;  Surgeon: Eddi Hernandez MD;  Location: [facility] OR;  Service: General;  Laterality: Right;     Review of patient's allergies indicates:  No Known Allergies    Current Facility-Administered Medications on File Prior to Encounter   Medication    heparin, porcine (PF) 100 unit/mL injection flush 500 Units    sodium chloride 0.9% flush 10 mL     Current Outpatient Medications on File Prior to Encounter   Medication Sig    albuterol (VENTOLIN HFA) 90 mcg/actuation inhaler Inhale 2 puffs into the lungs every 6 (six) hours as needed for Wheezing. Rescue    albuterol-ipratropium (DUO-NEB) 2.5 mg-0.5 mg/3 mL nebulizer solution Inhale contents of 1 vial (3 mLs) by nebulization every 4 (four) hours as needed for Wheezing or Shortness of Breath. Rescue    amLODIPine (NORVASC) 10 MG tablet Take 1 tablet (10 mg total) by mouth  once daily.    apixaban (ELIQUIS) 5 mg Tab Take 1 tablet (5 mg total) by mouth 2 (two) times daily.    ascorbic acid-multivit-min (VITAMIN C ENERGY BOOSTER) 1,000 mg PwEP Take 3,000 mg by mouth once daily. Patient takes 3,000 mg per day    aspirin 81 MG Chew Take 1 tablet (81 mg total) by mouth once daily.    atorvastatin (LIPITOR) 80 MG tablet Take 1 tablet (80 mg total) by mouth once daily.    cilostazoL (PLETAL) 100 MG Tab TAKE 1 TABLET(100 MG) BY MOUTH TWICE DAILY    furosemide (LASIX) 40 MG tablet Take 1 tablet (40 mg total) by mouth 2 (two) times a day.    metoprolol tartrate (LOPRESSOR) 50 MG tablet Take 1 tablet (50 mg total) by mouth 3 (three) times daily.    oxyCODONE (ROXICODONE) 15 MG Tab TAKE 1 PO AT NIGHT PRN PAIN AND 1/3 PILL DAILY PRN PAIN    tiotropium (SPIRIVA) 18 mcg inhalation capsule Inhale 1 capsule (18 mcg total) into the lungs via handihaler once daily. Controller    albuterol (PROVENTIL) 2.5 mg /3 mL (0.083 %) nebulizer solution Take 3 mLs by nebulization every 6 (six) hours as needed for Shortness of Breath.    KLOR-CON M10 10 mEq tablet Take 10 mEq by mouth once daily.     Family History       Problem Relation (Age of Onset)    Cancer Father, Sister    Diabetes Mother    Heart defect Mother    No Known Problems Son          Tobacco Use    Smoking status: Former     Current packs/day: 0.00     Average packs/day: 1 pack/day for 25.0 years (25.0 ttl pk-yrs)     Types: Cigarettes     Start date: 1995     Quit date: 2020     Years since quitting: 3.6    Smokeless tobacco: Never   Substance and Sexual Activity    Alcohol use: Yes     Comment: 11/3/22: Occ.    Drug use: Never    Sexual activity: Yes     Partners: Female     Review of Systems   Unable to perform ROS: Mental status change   Constitutional:  Positive for activity change. Negative for fever.   HENT:  Negative for voice change.    Respiratory:  Negative for shortness of breath.    Cardiovascular:  Negative for leg swelling.    Gastrointestinal:  Negative for vomiting.   Musculoskeletal:  Negative for neck stiffness.   Neurological:  Positive for weakness. Negative for seizures.   Psychiatric/Behavioral:  Positive for decreased concentration and sleep disturbance. Negative for agitation.      Objective:     Vital Signs (Most Recent):  Temp: 97.7 °F (36.5 °C) (08/22/23 1131)  Pulse: 77 (08/22/23 1131)  Resp: 17 (08/22/23 1131)  BP: 95/65 (08/22/23 1131)  SpO2: 95 % (08/22/23 1131) Vital Signs (24h Range):  Temp:  [97.7 °F (36.5 °C)-98.6 °F (37 °C)] 97.7 °F (36.5 °C)  Pulse:  [] 77  Resp:  [17-22] 17  SpO2:  [92 %-99 %] 95 %  BP: ()/(65-88) 95/65     Weight: 66.5 kg (146 lb 9.7 oz)  Body mass index is 21.04 kg/m².     Physical Exam  Constitutional:       Appearance: He is ill-appearing.   Eyes:      Extraocular Movements: EOM normal.   Pulmonary:      Effort: Pulmonary effort is normal.   Musculoskeletal:         General: Swelling present.   Neurological:      Coordination: Finger-nose-finger test: not following command.          NEUROLOGICAL EXAMINATION:     MENTAL STATUS   Follows 1 step commands.   Attention: decreased. Concentration: decreased.   Level of consciousness: drowsy ,  arousable by tactile stimuli    CRANIAL NERVES     CN III, IV, VI   Extraocular motions are normal.   Nystagmus: none   Ophthalmoparesis: none    CN VIII   Hearing: intact    CN IX, X   Palate: symmetric       Face symmetric at rest      MOTOR EXAM   Muscle bulk: decreased  Left arm tone: decreased    Strength   Right deltoid: 4/5  Right biceps: 4/5  Right triceps: 4/5  Right interossei: 4/5       Able to wiggle fingers on L hand, unable to lift LUE against gravity     Moves BLE against gravity, but decreased ROM and not willingly stretching out legs due to back pain     Diffuse negative myoclonus noted      REFLEXES        Diminished DTRs throughout      SENSORY EXAM        Difficult to elicit sensory exam given decreased attention/responsiveness       GAIT AND COORDINATION     Gait  Gait: (deferred)     Coordination   Finger-nose-finger test: not following command.    Significant Labs: All pertinent lab results from the past 24 hours have been reviewed.    Significant Imaging: I have reviewed all pertinent imaging results/findings within the past 24 hours.    -MRI neuro-axis W WO contrast pending    -MRI brachial plexus W contrast pending    -CTA Head/Neck (8/20/23):  No acute abnormality. No large vessel occlusion. Right basal ganglia lacunar infarct.  Right frontal and right parietal encephalomalacia changes again seen. Left apical neoplastic lesion with associated osseous destruction of the left 1st medial clavicle and 1st anterior rib.  Associated attenuation of the left proximal subclavian and left common carotid artery.  Pulmonary masses. Enlarged right paratracheal lymph node.

## 2023-08-22 NOTE — NURSING TRANSFER
Nursing Transfer Note      8/21/2023   10:17 PM    Nurse giving handoff:Sommer   Nurse receiving handoff:    Reason patient is being transferred: higher level of care,     [facility]     Transfer via stretcher    Transfer with O2    Transported by EMS    Transfer Vital Signs:  Blood Pressure:116/74   Heart Rate:107  O2:95 on 2 Liters NC  Temperature:97.9 oral  Respirations:20      Telemetry:  NO   Order for Tele Monitor? No    Additional Lines: Oxygen    4eyes on Skin: yes    Medicines sent: IV Fluids     Any special needs or follow-up needed: NO    Patient belongings transferred with patient: No    Chart send with patient: Yes    Notified: spouse    Patient reassessed at: now (date, time)  1  Upon arrival to floor: patient oriented to room, call bell in reach, and bed in lowest position

## 2023-08-22 NOTE — PT/OT/SLP PROGRESS
Speech Language Pathology  Discharge    Kashif Iverson  MRN: [id number]    Patient not seen today secondary to pt transfer from [facility], new orders needed if further ST warranted.   8/22/2023

## 2023-08-22 NOTE — CONSULTS
Patient presents as restless and continues to disrobe. However, he is verbal but not lucid. He soke of his wife and his gauri and requested prayer and held 's hand during the prayer. Nurse said pt. wife works during the day but comes and spends the night with pt.  will try to visit later

## 2023-08-22 NOTE — PLAN OF CARE
"   08/22/23 1712   Post-Acute Status   Post-Acute Authorization Placement   Post-Acute Placement Status   (therapy to provide recommendations)   Coverage MEDICARE - MEDICARE PART A & B   Hospital Resources/Appts/Education Provided Provided patient/caregiver with written discharge plan information   Discharge Delays None known at this time   Discharge Plan   Discharge Plan A   (waiting on therapy recommendations)     3:000 pm  CM met with wife in patients room and spouse is very overwhelmed with patients diagnosis. Patient wife stated, "We have been together 45 years and  for 30 and its hard watching this happen to him and I am so scared of being alone."  CM explained the discharge process and provided information re: resources for patient and  family support services.    3:08 pm   The wife reported the patient had a fall at home and before  he was independent and doing everything for himself and now he can not move and he is incontinent.  "Because when he was in the bed, I could not get him out of the bed and he is going to need a hospital bed and CM suggest a lift device.  CM informed the spouse that the medical team is still working the patient up with labs and ordering diagnostic tests and once therapy has been consulted for appropriate post acute needs. The patient wife understands that his cancer has metastasized to his bones.    CM will enter a spiritual consult for spiritual and emotional support.       Rosmery Cordoba RN  Case Management  [facility]  [phone]        "

## 2023-08-22 NOTE — PLAN OF CARE
Problem: Adult Inpatient Plan of Care  Goal: Plan of Care Review  Outcome: Ongoing, Progressing  Goal: Absence of Hospital-Acquired Illness or Injury  Outcome: Ongoing, Progressing  Goal: Optimal Comfort and Wellbeing  Outcome: Ongoing, Progressing  Goal: Readiness for Transition of Care  Outcome: Ongoing, Progressing     Problem: Fall Injury Risk  Goal: Absence of Fall and Fall-Related Injury  Outcome: Ongoing, Progressing     Problem: Skin Injury Risk Increased  Goal: Skin Health and Integrity  Outcome: Ongoing, Progressing     Problem: Impaired Wound Healing  Goal: Optimal Wound Healing  Outcome: Ongoing, Progressing     Problem: Infection  Goal: Absence of Infection Signs and Symptoms  Outcome: Ongoing, Progressing     Pt did not sleep well this shift. A&Ox 1 to self. On 2L NC. No tele orders. VSS. Skin tear noted to sacrum. Mepilex applied but pt removed. Pt removed IV x1. Bedbound at this time d/t confusion and weakness. LUE flaccid and painful when moved.  Safety precautions in place. Call light and personal items in reach. No further concerns noted at this time. NADN this shift. Will continue to monitor.

## 2023-08-22 NOTE — PLAN OF CARE
[facility] ([place] [address])  Initial Discharge Assessment       Primary Care Provider: Eduardo Ruiz MD    Admission Diagnosis: Fall [W19.XXXA]  Brachial plexopathy due to malignant neoplasm [G54.0, C80.1]  Paralysis of left upper extremity [G83.24]    Admission Date: 8/20/2023  Expected Discharge Date: 8/24/2023    Transition of Care Barriers: None    Payor: MEDICARE / Plan: MEDICARE PART A & B / Product Type: Government /     Extended Emergency Contact Information  Primary Emergency Contact: Natty Iverson  Address: [address] [place] [address] [place]  Mobile Phone: [phone]  Relation: Spouse  Preferred language: English   needed? No  Secondary Emergency Contact: Rolando Iverson  Mobile Phone: [phone]  Relation: Brother  Preferred language: English   needed? No    Discharge Plan A: Home with family (until results of tests are further evaluated per team)         [id number] [address] AT [address] [address]  Phone: [phone] Fax: [phone]    [facility] [address]  [address]   [facility] [address]  Phone: [phone] Fax: [phone]      Initial Assessment (most recent)       Adult Discharge Assessment - 08/22/23 1410          Discharge Assessment    Assessment Type Discharge Planning Assessment     Source of Information family     When was your last doctors appointment? 08/24/23   upcoming appt 8/24/23    Reason For Admission Encephalopathy, metabolic     People in Home child(tulio), adult;spouse     Do you expect to return to your current living situation? Yes     Do you have help at home or someone to help you manage your care at home? No     Prior to hospitilization cognitive status: Not Oriented to Place;Not Oriented to Time     Current cognitive status: Not Oriented to Place;Not Oriented to Time     Walking or Climbing  Stairs transferring difficulty, dependent     Mobility Management w/c     Dressing/Bathing dressing difficulty, dependent     Equipment Currently Used at Home wheelchair;oxygen     Readmission within 30 days? No     Patient currently being followed by outpatient case management? No   refer to [facility] at home or [facility]    Do you currently have service(s) that help you manage your care at home? No   had Jamal MARTINEZ 2 months ago    Do you take prescription medications? Yes     Do you have prescription coverage? Yes     Coverage MEDICARE - MEDICARE PART A & B     Do you have any problems affording any of your prescribed medications? No     Is the patient taking medications as prescribed? yes     Who is going to help you get home at discharge? Natty Iverson (Spouse)   [phone] (Mobile     How do you get to doctors appointments? family or friend will provide     Are you on dialysis? No     Do you take coumadin? No   Eliquis BID for A fib    DME Needed Upon Discharge  hospital bed;lift device     Discharge Plan discussed with: Spouse/sig other     Name(s) and Number(s) Natty Iverson (Spouse)   [phone] (Mobile)     Transition of Care Barriers None     Discharge Plan A Home with family   until results of tests are further evaluated per team       Physical Activity    On average, how many days per week do you engage in moderate to strenuous exercise (like a brisk walk)? 0 days     On average, how many minutes do you engage in exercise at this level? 0 min        Financial Resource Strain    How hard is it for you to pay for the very basics like food, housing, medical care, and heating? Patient refused        Housing Stability    In the last 12 months, was there a time when you were not able to pay the mortgage or rent on time? Patient refused     In the last 12 months, was there a time when you did not have a steady place to sleep or slept in a shelter (including now)? Patient refused        Transportation Needs    In the  past 12 months, has lack of transportation kept you from medical appointments or from getting medications? Patient refused     In the past 12 months, has lack of transportation kept you from meetings, work, or from getting things needed for daily living? Patient refused        Food Insecurity    Within the past 12 months, you worried that your food would run out before you got the money to buy more. Patient refused     Within the past 12 months, the food you bought just didn't last and you didn't have money to get more. Patient refused        Stress    Do you feel stress - tense, restless, nervous, or anxious, or unable to sleep at night because your mind is troubled all the time - these days? Patient refused        Social Connections    In a typical week, how many times do you talk on the phone with family, friends, or neighbors? Patient refused     How often do you get together with friends or relatives? Patient refused     How often do you attend Rastafari or Holiness services? Patient refused     Do you belong to any clubs or organizations such as Rastafari groups, unions, fraternal or athletic groups, or school groups? Patient refused     How often do you attend meetings of the clubs or organizations you belong to? Patient refused     Are you , , , , never , or living with a partner?    Natty Iverson (Spouse)   [phone] (Mobile)       Alcohol Use    Q1: How often do you have a drink containing alcohol? Never     Q2: How many drinks containing alcohol do you have on a typical day when you are drinking? Patient does not drink     Q3: How often do you have six or more drinks on one occasion? Never        OTHER    Name(s) of People in Home Natty Iverson (Spouse)   [phone] (Mobile) with 2 adult sons                        CM spoke with patient wife via phone  at bedside to complete discharge planning assessment.  Patient alert and oriented xs 1.  Patient  wife verified all  demographic information on facesheet is correct.  Patient  wife verified PCP is Dr Eduardo Ruiz  Patient  wife verified primary health insurance is  part A& B. Patient  wife reports not active with  home health and has listed DME.  Patient wife with NO POA or Living Will.  Patient  wife report patient is not on dialysis or medication coumadin, but is on Eliquis.  Patient is  with no 30 day admission.  Patient wife reports no financial issues at this time.  Patient family will provide transportation upon discharge from facility, but it may changed based on test results and recommendations made by his medical team.  Patient dependent with ADLs, lives  with spouse and adult sons. All questions answered regarding Case Management Discharge Planning, patient verbalized understanding.  Discharge booklet with CM's contact information given to patient.    Rosmery Cordoba RN  Case Management  [facility]  [phone]

## 2023-08-22 NOTE — PT/OT/SLP EVAL
Speech Language Pathology Evaluation  Bedside Swallow    Patient Name:  Kashif Iverson   MRN:  [id number]  Admitting Diagnosis: Encephalopathy, metabolic    Recommendations:                 General Recommendations:  Speech language evaluation, Cognitive-linguistic evaluation, and ongoing swallowing assessment  Diet recommendations:   , Thin, Full liquids   Aspiration Precautions: 1 bite/sip at a time, Alternating bites/sips, Assistance with meals, Eliminate distractions, Feed only when awake/alert, Frequent oral care, HOB to 90 degrees, Meds crushed in puree, Monitor for s/s of aspiration, Small bites/sips, and Strict aspiration precautions   General Precautions: Standard, aspiration, fall  Communication strategies:  go to room if call light pushed    Assessment:     Kashif Iverson is a 62 y.o. male with an SLP diagnosis of Dysphagia and increased risks of aspiration associated with altered mental status.     History:     Past Medical History:   Diagnosis Date    Chronic obstructive pulmonary disease with acute exacerbation 9/5/2022    Combined systolic and diastolic heart failure     Dependence on supplemental oxygen 5/24/2022    History of completed stroke 9/3/2019     09/03/2019 On CT exam    History of non-ST elevation myocardial infarction (NSTEMI) 2/22/2022    Hypertension     Lung cancer     NSCLC    Lung mass     left lung    Macrocytic anemia 8/24/2019    PAD (peripheral artery disease) 3/14/2022    LUIS FELIPE 3/11/22    Peripheral artery disease        Past Surgical History:   Procedure Laterality Date    BRONCHOSCOPY N/A 08/30/2019    Procedure: Bronchoscopy;  Surgeon: Miguel Diagnostic Provider;  Location: [facility] OR [address] [place] [address];  Service: Anesthesiology;  Laterality: N/A;    CORONARY ANGIOGRAPHY N/A 03/04/2022    Procedure: ANGIOGRAM, CORONARY ARTERY;  Surgeon: Robson Green MD;  Location: [facility] CATH LAB;  Service: Cardiology;  Laterality: N/A;    INSERTION OF TUNNELED CENTRAL VENOUS CATHETER (CVC) WITH SUBCUTANEOUS PORT       MEDIPORT REMOVAL Right 3/21/2023    Procedure: REMOVAL, CATHETER, CENTRAL VENOUS, TUNNELED, WITH PORT;  Surgeon: Eddi Hernandez MD;  Location: [facility] OR;  Service: General;  Laterality: Right;     HPI: Kashif Iverson is a 62 y.o. male, with a PMHx of PAD, diastolic CHF, COPD, non-small cell lung cancer who presents to the ED s/p fall on yesterday. Pt family reports pt slipped and fell in bathroom yesterday and afterwards was walking fine. EMS reports hypotensive bp 80/40 and heart rate 40-60 irregular. Pt given 500ml of NS per EMS. Pt placed on 2L of O2. On Today pt can't stand on his own, unable to ambulate. Pt reports leg and back pain. Leg pain is exacerbated with movement. Pt reports noticing flaccid left arm on yesterday. Patient denies lightheadedness, dizziness, or other associated symptoms. This is the extent of the patient's complaints today in the Emergency Department.CTA head,neck show,Right basal ganglia lacunar infarct.  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  Associated attenuation of the left proximal subclavian and left common carotid artery.  Pulmonary masses.   patient has severe  hypokalemia 2.4,being replaced po and IV,patient seems confused.transfer center trying sending patient to [facility] for multidisciplinary team.pending transfer,    Prior Intubation HX:  none during this admission    Modified Barium Swallow: none on file    Chest X-Rays: 8/20/23: Impression:     Similar abnormal left lung aeration when compared with 02/28/2023, likely related to postoperative changes and scarring.  Residual malignancy is difficult to exclude given abnormal findings on prior CTA chest.     Multiple bilateral pulmonary nodules, many of which are larger and more conspicuous when compared with prior study.  Metastatic disease remains in the differential.      Interval improvement/resolution of previously seen consolidation in the right lung base.     Three new hyperdense foci in the left axillary region and left upper extremity.  Suggest correlation for possibility foreign bodies versus overlapping structures or calcifications.       Prior diet: currently receiving full liquid diet/thin liquids, as recommended by SLP at       Subjective     Pt kept eyes closed and did not follow all commands. Though he did accept PO presentations of thin liquids and purees.     Pain/Comfort:  Pain Rating 1:  (no indications of pain)    Respiratory Status: Nasal cannula, flow 2 L/min    Objective:     Oral Musculature Evaluation  Oral Musculature: unable to assess due to poor participation/comprehension  Dentition: edentulous  Secretion Management: other (see comments) (spitting secretions)  Mucosal Quality: adequate  Voice Prior to PO Intake: hoarse, decreased volume  Oral Musculature Comments: difficult to assess as Pt was non complaint with tasks    Bedside Swallow Eval:   Consistencies Assessed:  Thin liquids cup sip x 1, straw sips x 2  Puree 1/2 tsp x 1, almost full tsp x 1  Solids deferred 2/2 altered mental status      Oral Phase:   WFL, but decreased spoon stripping for larger puree presentations    Pharyngeal Phase:   no overt clinical signs/symptoms of aspiration  no overt clinical signs/symptoms of pharyngeal dysphagia    Compensatory Strategies  None    Treatment: Education provided to pt regarding role of SLP, purpose of swallowing assessment, recommendations to remain on current diet at this time due to risks of aspiration, and plan for ongoing swallowing assessment.  Pt unable to demonstrate full understanding.     Goals:   Multidisciplinary Problems       SLP Goals          Problem: SLP    Goal Priority Disciplines Outcome   SLP Goal    Low SLP Ongoing, Progressing   Description: Speech Language Pathology Goals  Goals expected to be met by 8/29:  1. Pt will tolerate  least restrictive diet without s/s of aspiration.   2. Pt will participate speech/language/cognitive evaluation when appropriate.                                Plan:     Patient to be seen:  4 x/week   Plan of Care expires:  09/21/23  Plan of Care reviewed with:  patient   SLP Follow-Up:  Yes       Discharge recommendations:   (tbd)     Time Tracking:     SLP Treatment Date:   08/22/23  Speech Start Time:  1352  Speech Stop Time:  1359     Speech Total Time (min):  7 min    Billable Minutes: Eval Swallow and Oral Function 7    08/22/2023

## 2023-08-23 PROBLEM — Z51.5 ENCOUNTER FOR PALLIATIVE CARE: Status: ACTIVE | Noted: 2023-01-01

## 2023-08-23 PROBLEM — Z71.89 ACP (ADVANCE CARE PLANNING): Status: ACTIVE | Noted: 2023-01-01
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
## 2023-08-23 NOTE — ASSESSMENT & PLAN NOTE
"Kashif Iverson is a 62-year-old man with a history of stage IV NSCLC with metastasis to the bone s/p palliative chemo/radiation, chronic hypoxic respiratory failure 2/2 COPD on supplemental oxygen, HFpEF, atrial fibrillation on apixaban, PAD who was admitted after presenting with an unwitnessed fall and acute LUE paralysis. He was transferred from [facility] to [facility] for evaluation of new LUE weakness in setting of metastatic disease/injury. Palliative and Supportive Care was consulted to explore goals of care.    Advance Care Planning   Goals of Care:  - Code status: updated to DNAR/DNI  - Next of kin: wife Natty Iverson  - ACP documents: none  - Patient does not have decision making capacity  - Prognosis: poor  - Family's understanding of prognosis: fair, will immensely benefit from continued counseling about expectations  - Plans: spoke with attending Dr. Guevara, please engage case management to explore possibility of nursing home with hospice support. Finances will play an important role in deciding whether she can afford to get him to nursing home with hospice support vs bringing him home with hospice support. Family benefits from guidance about next best steps in order to honor wishes to be comfortable at this time.    Goals of Care Conversation:  - 8/23/23: I met Mr. Iverson, who is encephalopathic and disoriented. His wife is at bedside. I spoke with her in a separate room and she shares her fears and worries that he is dying. Validated her sadness and fears. She received a call from the attending hospitalist, who shared the unfortunate results of the MRI. She cried and asked, "Is he dying?" He replied, "Yes, he is dying. Not today, and not in the next days." He explained that he spoke with Oncology and confirmed that there are no treatment options available. She asked if there was a role in radiation to shrink his tumor. He shared that while he can get in touch with Rad/Onc to ask, that he worried " that even this will not fix his advanced cancer and not change the trajectory. I spoke briefly with Dr. Guevara and agreed to hang up the phone for now to allow Mrs. Iverson time to process the serious information. Allowed time for Mrs. Iverson to cry. She shared that they have been  for 38 years, dated for about 8 years before deciding to get  when they were 25 years old. He had always expressed that he wanted to live, and she said that maybe this is a different scenario. I agreed with her, that in the past, it made a lot of sense to comply with all the treatments and tests that he went through with the doctors in efforts to buy more time, and that now unfortunately we are in a different place. She asked what can we do. I shared that in knowing that time is short (likely short weeks) that we can do our best to make sure every living day he has on earth is spent out of pain and suffering. Discussed using hospice support during this time to ensure that he has the right medical team to help alleviate his suffering. For a long time we talked about home with hospice versus nursing home with hospice. She feels torn because she is not strong enough to change him in bed, but wonders if she's being cruel by not bringing him home. Normalized that many people do both - bring people home and engage their community to help or utilize nursing home, and that there are no wrong choices. Offered that we can at least explore the cost of nursing home (as this is a questions she had) and get more information for her and her family to make the final decision.  - I did share in my recommendations for comfort, that we protect Mr. Iverson from interventions that would cause more pain/suffering than help. Discussed that interventions like CPR/intubation will not help. I recommended that in efforts to allow Mr. Iverson a natural and peaceful death, that we protect him from these interventions, which means a DNR code status. She agreed.  She asked if I would share this with Dr. Guevara as well.  - Agreed to start utilizing medications for pain/anxiety so that he doesn't spend another day suffering. Will continue to follow along to support Mrs. Iverson and Mr. Iverson during this very heartbreaking time.

## 2023-08-23 NOTE — CHAPLAIN
Palliative Care     Patient: Kashif Iverson  MRN: [id number]  : 1960  Age: 62 y.o.  Hospital Length of Stay: 2  Code Status: Code Status Discussion Note  Attending Provider: Parmjit Guevara MD  Principal Problem: Encephalopathy, metabolic    Visited [facility] pt again and this time wife of 38 years, Natty, and son were bedside; wife met with [facility] doc at length about pt's condition. Pt was semi-sleeping, squirming and grimacing in bed a little and mumbling incoherently. This disturbs wife the most because she said she's been so used to him being able to talk and express himself before this hospital.     Provided listening ear as wife shares that he has been dx with cancer since 2019, but has been frank with ADLs until recently he had a fall and was incontinent and immobile, which prompted them to bring to hospital. She said many tests were done on him in which the results will be available tomorrow and then they can plan next steps. Wife is scared to bring him home in fear she cannot provide the best care for him.    Pt is Restorationist, wife is [employer]ism and said pt would go to [employer] with her often-- she was fine with me praying over pt earlier this morning when she wasn't present.     Wife was concerned pt was in pain and asked if he's had any pain meds; I communicated with [facility] doc and she communicated with bedside nurse and nurse will give pain meds.     Provided compassionate presence and tender touch to pt and held 1-way conversation with him reminding him he is so loved, being well taken care of and that his wife and son are by his side. Wife Natty is so tired, didn't sleep last night. Encouraged her to care for herself as well. Offered to get her water, but she declined. Gave Natty my card and said I'd keep her and her and her  in my prayers. She was appreciative of the visit.     Future (Spiritual Care Plan of Action):  Palliative  will continue to follow. , in your mercy.        **Spiritual Care Dept. Chaplains are available evenings, overnight and weekends **    In Peace,  Rev. Nicole Herrera MDiv, [facility]  Board Certified   Palliative Medicine Department  [phone]

## 2023-08-23 NOTE — SUBJECTIVE & OBJECTIVE
Interval History: NAEON. Patient continues to be altered, only oriented to person. MRI brachial plexus showing large mass within the left supraclavicular region with associated mediastinal, left upper lobe and left paravertebral extension and associated invasion/destruction of the left clavicle, left 1st rib and possibly the adjacent C6 through T2 vertebral bodies. Awaiting MRI brain and C/T/L spine read.     Review of Systems   Unable to perform ROS: Mental status change     Objective:     Vital Signs (Most Recent):  Temp: 97.9 °F (36.6 °C) (08/23/23 0730)  Pulse: 88 (08/23/23 0730)  Resp: 18 (08/23/23 0730)  BP: 109/68 (08/23/23 0730)  SpO2: (!) 94 % (08/23/23 0730) Vital Signs (24h Range):  Temp:  [97.8 °F (36.6 °C)-98.3 °F (36.8 °C)] 97.9 °F (36.6 °C)  Pulse:  [82-96] 88  Resp:  [17-20] 18  SpO2:  [94 %-100 %] 94 %  BP: (101-109)/(62-75) 109/68     Weight: 66.5 kg (146 lb 9.7 oz)  Body mass index is 21.04 kg/m².  No intake or output data in the 24 hours ending 08/23/23 1201        Physical Exam  HENT:      Nose: No congestion.   Eyes:      Extraocular Movements: Extraocular movements intact.      Pupils: Pupils are equal, round, and reactive to light.   Cardiovascular:      Rate and Rhythm: Normal rate and regular rhythm.   Pulmonary:      Effort: No respiratory distress.      Breath sounds: No wheezing.   Abdominal:      General: There is no distension.      Tenderness: There is no abdominal tenderness.   Musculoskeletal:         General: No swelling or deformity.      Cervical back: Normal range of motion.   Skin:     Coloration: Skin is not jaundiced.      Findings: No bruising.   Neurological:      Mental Status: He is alert. He is disoriented.             Significant Labs: All pertinent labs within the past 24 hours have been reviewed.    Significant Imaging: I have reviewed all pertinent imaging results/findings within the past 24 hours.

## 2023-08-23 NOTE — SUBJECTIVE & OBJECTIVE
Interval History: Remains disoriented. PT/OT worked with Mr. Iverson, requiring a lot of assistance. EEG in place during encounter. Met with wife at bedside and spoke in separate area.    Past Medical History:   Diagnosis Date    Chronic obstructive pulmonary disease with acute exacerbation 9/5/2022    Combined systolic and diastolic heart failure     Dependence on supplemental oxygen 5/24/2022    History of completed stroke 9/3/2019     09/03/2019 On CT exam    History of non-ST elevation myocardial infarction (NSTEMI) 2/22/2022    Hypertension     Lung cancer     NSCLC    Lung mass     left lung    Macrocytic anemia 8/24/2019    PAD (peripheral artery disease) 3/14/2022    LUIS FELIPE 3/11/22    Peripheral artery disease        Past Surgical History:   Procedure Laterality Date    BRONCHOSCOPY N/A 08/30/2019    Procedure: Bronchoscopy;  [facility];  Location: [address];  Service: Anesthesiology;  Laterality: N/A;    CORONARY ANGIOGRAPHY N/A 03/04/2022    Procedure: ANGIOGRAM, CORONARY ARTERY;  Surgeon: Robson Green MD;  Location: [facility] CATH LAB;  Service: Cardiology;  Laterality: N/A;    INSERTION OF TUNNELED CENTRAL VENOUS CATHETER (CVC) WITH SUBCUTANEOUS PORT      MEDIPORT REMOVAL Right 3/21/2023    Procedure: REMOVAL, CATHETER, CENTRAL VENOUS, TUNNELED, WITH PORT;  Surgeon: Eddi Hernandez MD;  Location: [facility] OR;  Service: General;  Laterality: Right;       Review of patient's allergies indicates:  No Known Allergies    Medications:  Continuous Infusions:   lactated ringers 75 mL/hr at 08/22/23 1459     Scheduled Meds:   albuterol-ipratropium  3 mL Nebulization Q6H WAKE    apixaban  5 mg Oral BID    aspirin  81 mg Oral Daily    atorvastatin  80 mg Oral Daily    cilostazoL  100 mg Oral BID    folic acid  1 mg Oral Daily    metoprolol tartrate  50 mg Oral TID    mupirocin   Nasal BID    OLANZapine zydis  5 mg Oral QHS    polyethylene glycol  17 g Oral Daily    potassium chloride SA  20 mEq  Oral Daily    senna-docusate 8.6-50 mg  1 tablet Oral BID    tiotropium bromide  2 puff Inhalation Daily     PRN Meds:acetaminophen, melatonin, naloxone, ondansetron, oxyCODONE, prochlorperazine, sodium chloride 0.9%    Family History       Problem Relation (Age of Onset)    Cancer Father, Sister    Diabetes Mother    Heart defect Mother    No Known Problems Son          Tobacco Use    Smoking status: Former     Current packs/day: 0.00     Average packs/day: 1 pack/day for 25.0 years (25.0 ttl pk-yrs)     Types: Cigarettes     Start date: 1995     Quit date: 2020     Years since quitting: 3.6    Smokeless tobacco: Never   Substance and Sexual Activity    Alcohol use: Yes     Comment: 11/3/22: Occ.    Drug use: Never    Sexual activity: Yes     Partners: Female       Review of Systems   Unable to perform ROS: Mental status change     Objective:     Vital Signs (Most Recent):  Temp: 98.4 °F (36.9 °C) (08/23/23 1214)  Pulse: 88 (08/23/23 0730)  Resp: 18 (08/23/23 0730)  BP: 109/68 (08/23/23 0730)  SpO2: (!) 94 % (08/23/23 0730) Vital Signs (24h Range):  Temp:  [97.8 °F (36.6 °C)-98.4 °F (36.9 °C)] 98.4 °F (36.9 °C)  Pulse:  [82-96] 88  Resp:  [17-20] 18  SpO2:  [94 %-100 %] 94 %  BP: (101-109)/(62-75) 109/68     Weight: 66.5 kg (146 lb 9.7 oz)  Body mass index is 21.04 kg/m².       Physical Exam  Constitutional:       Appearance: He is ill-appearing.   HENT:      Head: Normocephalic and atraumatic.      Right Ear: External ear normal.      Left Ear: External ear normal.      Nose: Nose normal.      Mouth/Throat:      Mouth: Mucous membranes are dry.   Eyes:      Conjunctiva/sclera: Conjunctivae normal.   Cardiovascular:      Rate and Rhythm: Normal rate.   Pulmonary:      Breath sounds: Wheezing present.   Abdominal:      General: Bowel sounds are normal.      Palpations: Abdomen is soft.   Musculoskeletal:         General: Tenderness present.   Lymphadenopathy:      Cervical: No cervical adenopathy.   Skin:      General: Skin is dry.   Neurological:      Mental Status: He is disoriented.            Review of Symptoms      Symptom Assessment (ESAS 0-10 Scale)  Unable to complete assessment due to Mental status change         Pain Assessment in Advanced Demential Scale (PAINAD)   Breathing - Independent of vocalization:  1  Negative vocalization:  0  Facial expression:  1  Body language:  1  Consolability:  1  Total:  4    Living Arrangements:  Lives with spouse    Psychosocial/Cultural:   See Palliative Psychosocial Note: No   to wife for 38 years  **Primary  to Follow**  Palliative Care  Consult: No    Spiritual:  F - Noa and Belief:  Orthodoxy - raised [employer], but attended [employer] with wife once they got   A - Address in Care:  Engaged spiritual support        Advance Care Planning   Advance Directives:   Living Will: No    LaPOST: No    Do Not Resuscitate Status: Yes    Medical Power of : No      Decision Making:  Family answered questions and Patient unable to communicate due to disease severity/cognitive impairment  Goals of Care: The family endorses that what is most important right now is to focus on comfort and QOL     Accordingly, we have decided that the best plan to meet the patient's goals includes enrolling in hospice care           Significant Labs: CBC:   Recent Labs   Lab 08/22/23  0639 08/23/23  0459   WBC 15.71* 13.65*   HGB 12.8* 12.5*   HCT 37.6* 37.7*    237     CMP:   Recent Labs   Lab 08/21/23 2229 08/22/23  0639 08/23/23  0459   * 136 135*   K 4.0 3.4* 3.6   CL 91* 96 97   CO2 30* 30* 27   * 116* 95   BUN 12 10 10   CREATININE 0.8 0.7 0.6   CALCIUM 8.8 8.7 8.5*   PROT  --  6.1 5.9*   ALBUMIN  --  2.3* 2.2*   BILITOT  --  0.8 0.7   ALKPHOS  --  122 116   AST  --  35 38   ALT  --  12 13   ANIONGAP 14 10 11     CBC:   Recent Labs   Lab 08/23/23  0459   WBC 13.65*   HGB 12.5*   HCT 37.7*   MCV 86        BMP:  Recent  Labs   Lab 08/23/23  0459   GLU 95   *   K 3.6   CL 97   CO2 27   BUN 10   CREATININE 0.6   CALCIUM 8.5*   MG 1.7     LFT:  Lab Results   Component Value Date    AST 38 08/23/2023    ALKPHOS 116 08/23/2023    BILITOT 0.7 08/23/2023     Albumin:   Albumin   Date Value Ref Range Status   08/23/2023 2.2 (L) 3.5 - 5.2 g/dL Final     Protein:   Total Protein   Date Value Ref Range Status   08/23/2023 5.9 (L) 6.0 - 8.4 g/dL Final     Lactic acid:   Lab Results   Component Value Date    LACTATE 2.2 08/20/2023    LACTATE 3.7 (HH) 03/16/2023       Significant Imaging: I have reviewed all pertinent imaging results/findings within the past 24 hours.

## 2023-08-23 NOTE — PT/OT/SLP PROGRESS
Speech Language Pathology Treatment    Patient Name:  Kashif Iverson   MRN:  [id number]  Admitting Diagnosis: Encephalopathy, metabolic    Recommendations:                 General Recommendations:   ongoing swallowing assessment  Diet recommendations:   , Liquid Diet Level: Full liquids, Thin   Aspiration Precautions: 1 bite/sip at a time, Alternating bites/sips, Assistance with meals, Eliminate distractions, Feed only when awake/alert, Frequent oral care, HOB to 90 degrees, Meds crushed in puree, Monitor for s/s of aspiration, Small bites/sips, and Strict aspiration precautions   General Precautions: Standard, aspiration, fall  Communication strategies:  go to room if call light pushed    Assessment:     Kashif Iverson is a 62 y.o. male with an SLP diagnosis of Dysphagia and increased risk of aspiration associated with altered mental status.     Subjective     Pt sitting up in bed with EEG cap on and EEG tech. Pt kept eyes closed most of the time, but was responsive to SLP. Pt also responded to commands to open eyes, but usually closed eyes shortly thereafter.  Pt's wife also present.       Pain/Comfort:  Pain Rating 1: 0/10    Respiratory Status: Nasal cannula, flow 2 L/min    Objective:     Has the patient been evaluated by SLP for swallowing?   Yes  Keep patient NPO? No   Current Respiratory Status:        Pt seen for ongoing swallowing assessment.  Mental status somewhat improved compared to yesterday, but pt still confused.  Pt requiring verbal cues to prepare for oral acceptance of PO presentations due to confusion. Pt accepted approx 6oz of water with sodium phospate dissolved in it via straw.  Cough present x 1 when pt took large cyclical sips.  Pt alternated sips of water with bites of pudding, consuming approx 2oz of pudding.  SLP did not feel it was safe to trial solids at this time due to pt's level of confusion and apparent waxing/waning of alertness.  Education was provided to pt and wife regarding role of  SLP, ongoing swallowing assessment, aspiration, increased risks of aspiration associated with altered mental status, recommendations to remain on current diet at this time, and SLP treatment plan and POC. Pt's wife demonstrated understanding. Pt's understanding and carryover likely decreased 2/2 AMS.     Goals:   Multidisciplinary Problems       SLP Goals          Problem: SLP    Goal Priority Disciplines Outcome   SLP Goal    Low SLP Ongoing, Progressing   Description: Speech Language Pathology Goals  Goals expected to be met by 8/29:  1. Pt will tolerate least restrictive diet without s/s of aspiration.   2. Pt will participate speech/language/cognitive evaluation when appropriate.                                Plan:     Patient to be seen:  4 x/week   Plan of Care expires:  09/21/23  Plan of Care reviewed with:  patient, spouse   SLP Follow-Up:  Yes       Discharge recommendations:  nursing facility, skilled     Time Tracking:     SLP Treatment Date:   08/23/23  Speech Start Time:  1222  Speech Stop Time:  1242     Speech Total Time (min):  20 min    Billable Minutes:  Treatment Swallowing Dysfunction 10 and Self Care/Home Management Training 10    08/23/2023

## 2023-08-23 NOTE — PROGRESS NOTES
[facility] ([address] Medicine  Progress Note    Patient Name: Kashif Iverson  MRN: [id number]  Patient Class: IP- Inpatient   Admission Date: 8/20/2023  Length of Stay: 2 days  Attending Physician: Parmjit Guevara MD  Primary Care Provider: Eduardo Ruiz MD        Subjective:     Principal Problem:Encephalopathy, metabolic        HPI:  Kashif Iverson is a 62 y.o. male, with a PMHx of PAD, diastolic CHF, COPD, non-small cell lung cancer who presents to the ED s/p fall on yesterday. Pt family reports pt slipped and fell in bathroom yesterday and afterwards was walking fine. EMS reports hypotensive bp 80/40 and heart rate 40-60 irregular. Pt given 500ml of NS per EMS. Pt placed on 2L of O2. On Today pt can't stand on his own, unable to ambulate. Pt reports leg and back pain. Leg pain is exacerbated with movement. Pt reports noticing flaccid left arm on yesterday. Patient denies lightheadedness, dizziness, or other associated symptoms. This is the extent of the patient's complaints today in the Emergency Department.CTA head,neck show,Right basal ganglia lacunar infarct.  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  Associated attenuation of the left proximal subclavian and left common carotid artery.  Pulmonary masses.   patient has severe  hypokalemia 2.4,being replaced po and IV,patient seems confused.transfer center trying sending patient to [facility] for multidisciplinary team.pending transfer,      Overview/Hospital Course:  No notes on file    Interval History: NAEON. Patient continues to be altered, only oriented to person. MRI brachial plexus showing large mass within the left supraclavicular region with associated mediastinal, left upper lobe and left paravertebral extension and associated invasion/destruction of the left clavicle, left 1st  rib and possibly the adjacent C6 through T2 vertebral bodies. Awaiting MRI brain and C/T/L spine read.     Review of Systems   Unable to perform ROS: Mental status change     Objective:     Vital Signs (Most Recent):  Temp: 97.9 °F (36.6 °C) (08/23/23 0730)  Pulse: 88 (08/23/23 0730)  Resp: 18 (08/23/23 0730)  BP: 109/68 (08/23/23 0730)  SpO2: (!) 94 % (08/23/23 0730) Vital Signs (24h Range):  Temp:  [97.8 °F (36.6 °C)-98.3 °F (36.8 °C)] 97.9 °F (36.6 °C)  Pulse:  [82-96] 88  Resp:  [17-20] 18  SpO2:  [94 %-100 %] 94 %  BP: (101-109)/(62-75) 109/68     Weight: 66.5 kg (146 lb 9.7 oz)  Body mass index is 21.04 kg/m².  No intake or output data in the 24 hours ending 08/23/23 1201        Physical Exam  HENT:      Nose: No congestion.   Eyes:      Extraocular Movements: Extraocular movements intact.      Pupils: Pupils are equal, round, and reactive to light.   Cardiovascular:      Rate and Rhythm: Normal rate and regular rhythm.   Pulmonary:      Effort: No respiratory distress.      Breath sounds: No wheezing.   Abdominal:      General: There is no distension.      Tenderness: There is no abdominal tenderness.   Musculoskeletal:         General: No swelling or deformity.      Cervical back: Normal range of motion.   Skin:     Coloration: Skin is not jaundiced.      Findings: No bruising.   Neurological:      Mental Status: He is alert. He is disoriented.             Significant Labs: All pertinent labs within the past 24 hours have been reviewed.    Significant Imaging: I have reviewed all pertinent imaging results/findings within the past 24 hours.      Assessment/Plan:      * Encephalopathy, metabolic  Metastatic SCC of lung with extensive mets s/p palliative chemoradiation  CTA head with no acute abnormality. No large vessel occlusion. Right basal ganglia lacunar infarct.  Right frontal and right parietal encephalomalacia changes again seen.   Neurology consulted  Delirium precautions  Ammonia, B12, TSH WNL. Folate  deficient, will supplement  MRI brachial plexus showing large mass with extensive invasion/mets  MRI brain and C/T/L spine completed, awaiting read -  EEG pending  MRI findings discussed with Natty (wife). Very poor prognosis. Would benefit from comfort care.  Palliative care consulted      ACP (advance care planning)  Advance Care Planning     Date: 08/23/2023    Code Status  In light of the patients advanced and life limiting illness,I engaged the the family in a voluntary conversation about the patient's preferences for care at the very end of life. The patient wishes to continue with life prolonging measures at this time.  Along those lines, the patient does wish to have CPR or other invasive treatments performed when his heart and/or breathing stops. I communicated to the family that patient will remain FULL code at this time.  I spent a total of 15 minutes engaging the patient in this advance care planning discussion.       [facility]  I engaged the family in a voluntary conversation about advance care planning and we specifically addressed what the goals of care would be moving forward, in light of the patient's change in clinical status, specifically worsening metastatic lung cancer.  We did specifically address the patient's likely prognosis, which is poor.  We explored the patient's values and preferences for future care.  The family endorses that what is most important right now is to focus on curative/life-prolongation (regardless of treatment burdens)    Accordingly, we have decided that the best plan to meet the patient's goals includes continuing with treatment    I did not explain the role for hospice care at this stage of the patient's illness, including its ability to help the patient live with the best quality of life possible.  We will not be making a hospice referral.    I spent a total of 15 minutes engaging the patient in this advance care planning discussion.             Encounter for palliative  care        Left arm weakness  MRI brachial plexus showing large mass within the left supraclavicular region with associated mediastinal, left upper lobe and left paravertebral extension and associated invasion/destruction of the left clavicle, left 1st rib and possibly the adjacent C6 through T2 vertebral bodies.        Squamous cell carcinoma of lung  Has extensive metastasis  Chart review shows patient not candidate for further treatment. Case discussed with heme/onc here who are in agreement.  Palliative care consulted    Persistent atrial fibrillation  On OAC, will monitor.    COPD (chronic obstructive pulmonary disease)  Duoneb PRN        Coronary artery disease involving native coronary artery of native heart without angina pectoris  Cont aspirin, statin      PAD (peripheral artery disease)        Chronic diastolic heart failure  No signs of decompensated HF  Cont aspirin, metoprolol      Hypokalemia  Supplement      Essential hypertension  Cont metoprolol      Bronchiectasis without complication        Atherosclerosis of aorta  On medical treatments.      Secondary malignant neoplasm of bone        Lung cancer, main bronchus, left          VTE Risk Mitigation (From admission, onward)           Ordered     apixaban tablet 5 mg  2 times daily         08/21/23 1220                    Discharge Planning   AZ: 8/29/2023     Code Status: Full Code   Is the patient medically ready for discharge?: No    Reason for patient still in hospital (select all that apply): Treatment and Imaging  Discharge Plan A:  (waiting on therapy recommendations)   Discharge Delays: None known at this time              Parmjit Guevara MD  Department of Hospital Medicine   [facility] - Intensive Care ([place]

## 2023-08-23 NOTE — PLAN OF CARE
PT evaluation complete and appropriate goals established.    Problem: Physical Therapy  Goal: Physical Therapy Goal  Description: Goals to be met by: 2023     Patient will increase functional independence with mobility by performin. Supine to sit with Contact Guard Assistance  2. Sit to supine with Stand-by Assistance  3. Sit to stand transfer with Minimal Assistance  4. Bed to chair transfer with Minimal Assistance using LRAD  5. Gait  x 15 feet with Minimal Assistance using LRAD.   6. Lower extremity exercise program x15 reps per handout, with independence    Outcome: Ongoing, Progressing

## 2023-08-23 NOTE — ASSESSMENT & PLAN NOTE
MRI brachial plexus showing large mass within the left supraclavicular region with associated mediastinal, left upper lobe and left paravertebral extension and associated invasion/destruction of the left clavicle, left 1st rib and possibly the adjacent C6 through T2 vertebral bodies.

## 2023-08-23 NOTE — PT/OT/SLP EVAL
Physical Therapy Evaluation and Treatment    Patient Name:  Kashif Iverson   MRN:  [id number]    Recommendations:     Discharge Recommendations: nursing facility, skilled   Discharge Equipment Recommendations: to be determined by next level of care   Barriers to discharge: Increased level of assist and Decreased caregiver support    Assessment:     Kashif Iverson is a 62 y.o. male admitted with a medical diagnosis of Encephalopathy, metabolic. He presents with the following impairments/functional limitations: weakness, impaired endurance, impaired self care skills, impaired functional mobility, gait instability, impaired balance, impaired cognition, decreased upper extremity function, decreased lower extremity function, decreased safety awareness, pain, impaired coordination. Pt with fair tolerance to physical therapy evaluation on this day. Pt difficult to arouse with AMS noted throughout, but was participative in session. Pt requiring increased level of assist for bed mobility and with static/dynamic sitting balance at EOB. Upon PT attempt to complete STS, pt with c/o SOB and pt abruptly returned themselves to supine without PT instruction. Oxygen saturation at 95% after return to supine. Pt refused 2nd attempt at STS stating he was tired. Patient currently demonstrates a need for therapy on a daily basis in a skilled nursing facility secondary to a decline in functional status secondary to disease in order to remedy the above impairments, decrease fall risk, reduce caregiver burden, return to prior level of independence, and improve quality of life. Pt would continue to benefit from skilled acute PT in order to address current deficits and progress functional mobility.    Rehab Prognosis: Good; patient would benefit from acute skilled PT services 3 x/week to address these deficits and reach maximum level of function.  Recent Surgery: * No surgery found *      Plan:     During this hospitalization, patient to be seen 3  "x/week to address the identified rehab impairments via gait training, therapeutic activities, therapeutic exercises, neuromuscular re-education and progress toward the following goals:    Plan of Care Expires:  09/23/23    Subjective     Chief Complaint: Back pain  Patient/Family Comments/Goals: "I really don't think we can bring him home like this, I am so scared." - pts wife  Pain/Comfort:  Pain Rating 1: 8/10  Location 1: back  Pain Addressed 1: Reposition, Distraction, Cessation of Activity  Pain Rating Post-Intervention 1:  (did not rate)    Patients cultural, spiritual, Yazidi conflicts given the current situation: no    Living Environment:  Living Environment: Patient lives with their spouse and child(tulio) in a single story house with number of outside stair(s): 0 and tub-shower combo.  Prior Level of Function: Prior to admission, patient was independent with mobility and requires minimal assistance with ADLs.  Equipment Used at Home: wheelchair, oxygen.  DME owned (not currently used): single point cane  Assistance Upon Discharge: family    Objective:     Communicated with nursing prior to session. Patient found supine with oxygen, peripheral IV upon PT entry to room.    General Precautions: Standard, aspiration, fall  Orthopedic Precautions:N/A    Braces: N/A  Respiratory Status: Pt on 2L of O2 throughout.    Exams:  Cognitive Exam:  Patient is oriented to Person, Not oriented to place, Not oriented to time, Not oriented to situation, follows commands 75% of the time. Pt very difficult to arouse and keeping eyes closed majority of the session.  RLE ROM: unable to formally assess  RLE Strength: unable to formally assess  LLE ROM: unable to formally assess  LLE Strength: unable to formally assess  Sensation: Intact light touch to BLEs    Functional Mobility:  Bed Mobility:  Verbal cueing throughout for improved motor initiation  Rolling Left:  moderate assistance  Rolling Right: minimum assistance  Supine " to Sit: moderate assistance for LE management and trunk management with HOB elevated and increased time required  Sit to Supine: minimum assistance for LE management and trunk management  Balance:   Static Sitting: Fair, able to maintain for 6 minute(s) with contact guard assistance  Verbal cues provided throughout for maintenance of midline orientation, upright posture, and attention to task. Pt assisted with putting on gown while seated at EOB  Dynamic Sitting: Poor: Patient unable to accept challenge or move without loss of balance, minimum assistance  Static Standing: not assessed this visit  Dynamic Standing: not assessed this visit    Therapeutic Activities and Exercises:  Patient educated on role of acute care PT and PT POC, safety while in hospital including calling nurse for mobility, and call light usage  Pt educated on the effects of bed rest and the importance of OOB activity. Pt encouraged to sit UIC majority of day as tolerated and continue daily ambulation with nursing assist. Pt verbalized understanding.  Pt and pt's family educated on discharge recommendation. Pt's wife verbalized understanding.  Extended time spent in room answering family member questions within PT scope of practice and 2/2 RN care at bedside.    AM-PAC 6 CLICK MOBILITY  Total Score:8     Patient left supine with all lines intact, call button in reach, bed alarm on, and spouse and member of EEG team present.    GOALS:   Multidisciplinary Problems       Physical Therapy Goals          Problem: Physical Therapy    Goal Priority Disciplines Outcome Goal Variances Interventions   Physical Therapy Goal     PT, PT/OT Ongoing, Progressing     Description: Goals to be met by: 2023     Patient will increase functional independence with mobility by performin. Supine to sit with Contact Guard Assistance  2. Sit to supine with Stand-by Assistance  3. Sit to stand transfer with Minimal Assistance  4. Bed to chair transfer with  Minimal Assistance using LRAD  5. Gait  x 15 feet with Minimal Assistance using LRAD.   6. Lower extremity exercise program x15 reps per handout, with independence                         History:     Past Medical History:   Diagnosis Date    Chronic obstructive pulmonary disease with acute exacerbation 9/5/2022    Combined systolic and diastolic heart failure     Dependence on supplemental oxygen 5/24/2022    History of completed stroke 9/3/2019     09/03/2019 On CT exam    History of non-ST elevation myocardial infarction (NSTEMI) 2/22/2022    Hypertension     Lung cancer     NSCLC    Lung mass     left lung    Macrocytic anemia 8/24/2019    PAD (peripheral artery disease) 3/14/2022    LUIS FELIPE 3/11/22    Peripheral artery disease        Past Surgical History:   Procedure Laterality Date    BRONCHOSCOPY N/A 08/30/2019    Procedure: Bronchoscopy;  Surgeon: [facility] Diagnostic Provider;  Location: [address];  Service: Anesthesiology;  Laterality: N/A;    CORONARY ANGIOGRAPHY N/A 03/04/2022    Procedure: ANGIOGRAM, CORONARY ARTERY;  Surgeon: Robson Green MD;  Location: [facility] CATH LAB;  Service: Cardiology;  Laterality: N/A;    INSERTION OF TUNNELED CENTRAL VENOUS CATHETER (CVC) WITH SUBCUTANEOUS PORT      MEDIPORT REMOVAL Right 3/21/2023    Procedure: REMOVAL, CATHETER, CENTRAL VENOUS, TUNNELED, WITH PORT;  Surgeon: Eddi Hernandez MD;  Location: [facility] OR;  Service: General;  Laterality: Right;       Time Tracking:     PT Received On: 08/23/23  PT Start Time: 1059     PT Stop Time: 1130  PT Total Time (min): 31 min     Billable Minutes: Evaluation 15 Therapeutic Activity 16      08/23/2023

## 2023-08-23 NOTE — NURSING
Pt code status has been updated to DNR. He was grimacing and restless received a dose of Oxycodone at 1547 and is has been resting comfortably since. Pt is oriented to self, resting comfortably in bed chest rise and fall observed call light within reach, vitals stable, bed in lowest position, and locked.

## 2023-08-23 NOTE — PROCEDURES
VIDEO ELECTROENCEPHALOGRAM  REPORT    DATE OF SERVICE:August 23, 2023  EEG NUMBER: [id number]   REQUESTED BY:  Parmjit Guevara MD  LOCATION OF SERVICE:  [facility]   Electroencephalographic (EEG) recording is with electrodes placed according to the International 10-20 placement system.  Thirty two (32) channels of digital signal (sampling rate of 512/sec) including T1 and T2 was simultaneously recorded from the scalp and may include  EKG, EMG, and/or eye monitors.  Recording band pass was 0.1 to 512 hz.  Digital video recording of the patient is simultaneously recorded with the EEG.  The patient is instructed report clinical symptoms which may occur during the recording session.  EEG and video recording is stored and archived in digital format.  Activation procedures which include photic stimulation, hyperventilation and instructing patients to perform simple task are done in selected patients.   The EEG is displayed on a monitor screen and can be reviewed using different montages.  Computer assisted analysis is employed to detect spike and electrographic seizure activity.   The entire record is submitted for computer analysis.  The entire recording is visually reviewed and the times identified by computer analysis as being spikes or seizures are reviewed again.  Compresses spectral analysis (CSA) is also performed on the activity recorded from each individual channel.  This is displayed as a power display of frequencies from 0 to 30 Hz over time.   The CSA is reviewed looking for asymmetries in power between homologous areas of the scalp and then compared with the original EEG recording.     [employer] software was also utilized in the review of this study.  This software suite analyzes the EEG recording in multiple domains.  Coherence and rhythmicity is computed to identify EEG sections which may contain organized seizures.  Each channel undergoes analysis to detect presence of spike and sharp waves which  have special and morphological characteristic of epileptic activity.  The routine EEG recording is converted from spacial into frequency domain.  This is then displayed comparing homologous areas to identify areas of significant asymmetry.  Algorithm to identify non-cortically generated artifact is used to separate eye movement, EMG and other artifact from the EEG.      ELECTROENCEPHALOGRAM:    Indication: 62 year old male with history of stage IV NSCLC with metastases presenting with fall, LUE paralysis, and encephalopathy.     State of Consciousness:   Awake and asleep    Background:   The background is poorly organized, symmetric and continuous.   It consists mainly of generalized delta activity with some over-riding theta activity.   The AP gradient is fragmented and there is no clear PDR appreciated .        Sleep:   Transition to a quiet state is characterized by more diffuse slowing as well as rudimentary sleep architecture.    Epileptiform Abnormalities  None    EKG:   Regular rate and rhythm on single lead EKG    Activating procedures:   - Hyperventilation and photic stimulation are not performed     Events:   None    Impression:   Abnormal awake and sleep EEG showing background slowing and no PDR.  There are no epileptiform discharges or lateralizing signs recorded.     Clinical interpretation:   This awake and sleep EEG is consistent with a moderate to severe diffuse encephalopathy.  There are numerous possible etiologies including metabolic derangements, drug intoxication, systemic infections, or a primary neuronal disorder.  There is no evidence of seizure activity or focal abnormalities in the record.     Magaly Goodrich MD  [facility]   Department of Neurology/Epilepsy

## 2023-08-23 NOTE — SUBJECTIVE & OBJECTIVE
Subjective:     Interval History:   MRI Neuro-axis and brachial plexus completed revealing a large, partially necrotic mass in L supraclavicular region with mediastinal, L upper lobe and L paravertebral extension with invasion/destruction of L clavicle, L 1st rib and C6 to T2 vertebral bodies. Lesion encases the right brachiocephalic, left common carotid and left subclavian arteries/veins and encases the C3 through T2 nerve roots, trunks, divisions and cords of the left brachial plexus. Osseous metastatic lesions in cervicothoracic spine with pathologic fractures C7, T2 and T4 vertebral bodies and multifocal epidural tumor extension.   Still encephalopathic, EEG completed today, awaiting official read and report   Palliative care having GOC discussions with family, code status changed to DNR    Current Facility-Administered Medications   Medication Dose Route Frequency Provider Last Rate Last Admin    acetaminophen tablet 650 mg  650 mg Oral Q4H PRN Toy Cristina MD        albuterol-ipratropium 2.5 mg-0.5 mg/3 mL nebulizer solution 3 mL  3 mL Nebulization Q6H WAKE Valente Nava MD   3 mL at 08/23/23 1336    apixaban tablet 5 mg  5 mg Oral BID Valente Nava MD   5 mg at 08/23/23 1145    aspirin EC tablet 81 mg  81 mg Oral Daily Valente Nava MD   81 mg at 08/23/23 1145    atorvastatin tablet 80 mg  80 mg Oral Daily Toy Cristina MD   80 mg at 08/23/23 1145    cilostazoL tablet 100 mg  100 mg Oral BID Toy Cristina MD   100 mg at 08/23/23 1145    folic acid tablet 1 mg  1 mg Oral Daily HandParmjit MD   1 mg at 08/23/23 1145    lactated ringers infusion   Intravenous Continuous HandParmjit MD 75 mL/hr at 08/22/23 1459 New Bag at 08/22/23 1459    melatonin tablet 6 mg  6 mg Oral Nightly PRN Toy Cristina MD        metoprolol tartrate (LOPRESSOR) tablet 50 mg  50 mg Oral TID Toy Cristina MD   50 mg at 08/23/23 1145    mupirocin 2 % ointment   Nasal BID  Hand, Parmjit ENRIQUEZ MD        naloxone 0.4 mg/mL injection 0.02 mg  0.02 mg Intravenous PRN Toy Cristina MD        OLANZapine zydis disintegrating tablet 5 mg  5 mg Oral QHS Toy Cristina MD   5 mg at 08/22/23 2100    ondansetron injection 4 mg  4 mg Intravenous Q8H PRN Toy Cristina MD        oxyCODONE immediate release tablet Tab 10 mg  10 mg Oral Q4H PRN Kacie Huston MD        polyethylene glycol packet 17 g  17 g Oral Daily Toy Cristina MD   17 g at 08/22/23 0917    potassium chloride SA CR tablet 20 mEq  20 mEq Oral Daily Toy Cristina MD   20 mEq at 08/23/23 1145    prochlorperazine injection Soln 5 mg  5 mg Intravenous Q6H PRN Toy Cristina MD        senna-docusate 8.6-50 mg per tablet 1 tablet  1 tablet Oral BID Toy Cristina MD   1 tablet at 08/23/23 1145    sodium chloride 0.9% flush 10 mL  10 mL Intravenous Q6H PRN Toy Cristina MD        tiotropium bromide 2.5 mcg/actuation inhaler 2 puff  2 puff Inhalation Daily Toy Cristina MD         Facility-Administered Medications Ordered in Other Encounters   Medication Dose Route Frequency Provider Last Rate Last Admin    heparin, porcine (PF) 100 unit/mL injection flush 500 Units  500 Units Intravenous PRN Sameera Amador MD   500 Units at 01/20/23 1652    sodium chloride 0.9% flush 10 mL  10 mL Intravenous PRN Sameera Amador MD   10 mL at 01/20/23 1652     Review of Systems   Unable to perform ROS: Mental status change   Constitutional:  Positive for activity change and fatigue. Negative for fever.   Respiratory:  Positive for shortness of breath.    Musculoskeletal:  Positive for back pain and gait problem.   Allergic/Immunologic: Positive for immunocompromised state.   Psychiatric/Behavioral:  Positive for confusion, decreased concentration and sleep disturbance.      Objective:     Vital Signs (Most Recent):  Temp: 98.4 °F (36.9 °C) (08/23/23 1214)  Pulse: 81 (08/23/23 1336)  Resp: 17 (08/23/23  1336)  BP: 107/66 (08/23/23 1214)  SpO2: 99 % (08/23/23 1336) Vital Signs (24h Range):  Temp:  [97.8 °F (36.6 °C)-98.4 °F (36.9 °C)] 98.4 °F (36.9 °C)  Pulse:  [80-96] 81  Resp:  [17-20] 17  SpO2:  [94 %-100 %] 99 %  BP: (101-109)/(62-75) 107/66     Weight: 66.5 kg (146 lb 9.7 oz)  Body mass index is 21.04 kg/m².     Physical Exam  Neurological:      Coordination: Finger-nose-finger test: not following command.          NEUROLOGICAL EXAMINATION:     MENTAL STATUS   Attention: decreased. Concentration: decreased.   Level of consciousness: drowsy ,  arousable by verbal stimuli    MOTOR EXAM   Muscle bulk: decreased       Decreased ROM BLE felt secondary to pain   Wiggles fingers on L hand, but no spontaneous movement against gravity of proximal UE     GAIT AND COORDINATION     Gait  Gait: (deferred)     Coordination   Finger-nose-finger test: not following command.    Significant Labs: All pertinent lab results from the past 24 hours have been reviewed.    EEG (8/23/23) report pending    Significant Imaging: I have reviewed all pertinent imaging results/findings within the past 24 hours.    MRI Brain W WO contrast (report pending)    MRI Cervical/Thoracic/Lumbar spine W WO contrast (8/23/23):  Abnormal marrow signal and enhancement within the C5 through T2 vertebral bodies likely related to neoplastic infiltration from left supraclavicular mass lesion as seen on CT.Poor visualization of superimposed pathological fracture of C7 and T2 vertebra better seen on prior CT. Allowing for motion artifacts there is no definite abnormal intrathecal enhancement. There is additional abnormal marrow signal and enhancement within the left T3 vertebral body posteriorly and more diffusely involving the T4 vertebra concerning for additional neoplastic involvement.  Please note T4 component with probable extension to the epidural space at this level with at least mild encroachment on the central canal. There is questionable edema and  enhancement within the sacrum at the S2 level cannot exclude additional area neoplastic involvement. Please note there is inclusion of left supraclavicular mass which in cases the left carotid artery partially within the field of view and distorted by motion artifacts.    MRI Brachial plexus (8/23/23):  1. Large partially necrotic mass centered within the left supraclavicular region with associated mediastinal, left upper lobe and left paravertebral extension and associated invasion/destruction of the left clavicle, left 1st rib and possibly the adjacent C6 through T2 vertebral bodies.  Lesion encases the right brachiocephalic, left common carotid and left subclavian arteries/veins and encases the C3 through T2 nerve roots, trunks, divisions and cords of the left brachial plexus.  2. 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  3. Osseous metastatic lesions of the cervicothoracic spine with associated pathologic fractures of the C7, T2 and T4 vertebral bodies and multifocal epidural tumor extension, better evaluated on same date spine MRI.  4. Posttherapy changes of the left upper lobe with multiple bilateral pulmonary nodules, incompletely evaluated on this exam.  5. Mediastinal and axillary lymphadenopathy, incompletely evaluated on this exam.

## 2023-08-23 NOTE — CONSULTS
Palliative Care   Spiritual Care Consult    Patient: Kashif Iverson  MRN: [id number]  : 1960  Age: 62 y.o.  Hospital Length of Stay: 2  Code Status: Code Status Discussion Note  Attending Provider: Parmjit Guevara MD  Principal Problem: Encephalopathy, metabolic    Visited pall med patient per request of palliative medicine team, as well as for Spiritual Care Consult. Pt was alone an din the midst of bedside procedure-- neuro testing.    Provided compassionate presence, tender touch and prayed over/for pt; Pt is [employer] per [employer]; no family present, but per bedside nurse pt's wife is meeting with palliative care provider in conference.     Future (Spiritual Care Plan of Action):  Palliative  will continue to follow and will meet with wife to provide emotional and spiritual support. Lord, in your mercy.        **Spiritual Care Dept. Chaplains are available evenings, overnight and weekends **    In Peace,  Rev. Nicole Herrera MDiv, [facility]  Board Certified   Palliative Medicine Department  [phone]

## 2023-08-23 NOTE — ASSESSMENT & PLAN NOTE
62 y.o. male with HTN, PAD, CAD, Afib (Eliquis), HFpEF, COPD, lung cancer with bony mets s/p palliative chemoradiation presented to ED 8/21/23 after a fall the day prior to arrival. On EMS arrival, he was hypotensive and unable to stand unassisted or ambulate. He was complaining of back and leg pain and new LUE flaccid paralysis. CTA head/neck showed remote R basal ganglia lacunar infarct and R frontal/parietal encephalomalacia. L apical neoplastic lesion causing osseous destruction to left 1st medial clavicle, 1st anterior rib and pulmonary masses. Labs remarkable for critical hypokalemia (2.5) that improved with repletion. Neurology consulted (8/22/23) for LUE paralysis.     8/23-MRI neuro-axis and L brachial plexus completed unfortunately revealing numerous metastatic lesions   EEG completed given ongoing encephalopathy, read pending  Palliative having GOC discussions with pt wife, code changed    Recommendations:  --continue supportive/palliative care   --will f/u EEG read and call back if abnormal/any additional recs

## 2023-08-23 NOTE — ASSESSMENT & PLAN NOTE
Advance Care Planning     Date: 08/23/2023    Code Status  In light of the patients advanced and life limiting illness,I engaged the the family in a voluntary conversation about the patient's preferences for care at the very end of life. The patient wishes to continue with life prolonging measures at this time.  Along those lines, the patient does wish to have CPR or other invasive treatments performed when his heart and/or breathing stops. I communicated to the family that patient will remain FULL code at this time.  I spent a total of 15 minutes engaging the patient in this advance care planning discussion.       [facility]  I engaged the family in a voluntary conversation about advance care planning and we specifically addressed what the goals of care would be moving forward, in light of the patient's change in clinical status, specifically worsening metastatic lung cancer.  We did specifically address the patient's likely prognosis, which is poor.  We explored the patient's values and preferences for future care.  The family endorses that what is most important right now is to focus on curative/life-prolongation (regardless of treatment burdens)    Accordingly, we have decided that the best plan to meet the patient's goals includes continuing with treatment    I did not explain the role for hospice care at this stage of the patient's illness, including its ability to help the patient live with the best quality of life possible.  We will not be making a hospice referral.    I spent a total of 15 minutes engaging the patient in this advance care planning discussion.    Performed by Dr. Parmjit Guevara

## 2023-08-23 NOTE — ASSESSMENT & PLAN NOTE
· Known SCC of the left lung with mets  · Per Onc, no further treatment options  · Palliative Care consulted

## 2023-08-23 NOTE — CONSULTS
[facility] ([place] [address])  Palliative Medicine  Consult Note    Patient Name: Kashif Iverson  MRN: [id number]  Admission Date: 8/20/2023  Hospital Length of Stay: 2 days  Code Status: DNR   Attending Provider: Parmjit Guevara MD  Consulting Provider: Kacie Huston MD  Primary Care Physician: Eduardo Ruiz MD  Principal Problem:Encephalopathy, metabolic    Patient information was obtained from patient, spouse/SO and primary team.      Inpatient consult to Palliative Care  Consult performed by: Kacie Huston MD  Consult ordered by: Parmjit Guevara MD  Reason for consult: goals of care        Assessment/Plan:     Palliative Care  Encounter for palliative care  Kashif Iverson is a 62-year-old man with a history of stage IV NSCLC with metastasis to the bone s/p palliative chemo/radiation, chronic hypoxic respiratory failure 2/2 COPD on supplemental oxygen, HFpEF, atrial fibrillation on apixaban, PAD who was admitted after presenting with an unwitnessed fall and acute LUE paralysis. He was transferred from [facility] to [facility] for evaluation of new LUE weakness in setting of metastatic disease/injury. Palliative and Supportive Care was consulted to explore goals of care.    Advance Care Planning   Goals of Care:  - Code status: updated to DNAR/DNI  - Next of kin: wife Natty Iverson  - ACP documents: none  - Patient does not have decision making capacity  - Prognosis: poor  - Family's understanding of prognosis: fair, will immensely benefit from continued counseling about expectations  - Plans: spoke with attending Dr. Guevara, please engage case management to explore possibility of nursing home with hospice support. Finances will play an important role in deciding whether she can afford to get him to nursing home with hospice support vs bringing him home with hospice support. Family benefits from guidance about next best steps in order to honor wishes to be comfortable at this time.    Goals of  "Care Conversation:  - 8/23/23: I met Mr. Iverson, who is encephalopathic and disoriented. His wife is at bedside. I spoke with her in a separate room and she shares her fears and worries that he is dying. Validated her sadness and fears. She received a call from the attending hospitalist, who shared the unfortunate results of the MRI. She cried and asked, "Is he dying?" He replied, "Yes, he is dying. Not today, and not in the next days." He explained that he spoke with Oncology and confirmed that there are no treatment options available. She asked if there was a role in radiation to shrink his tumor. He shared that while he can get in touch with Rad/Onc to ask, that he worried that even this will not fix his advanced cancer and not change the trajectory. I spoke briefly with Dr. Guevara and agreed to hang up the phone for now to allow Mrs. Iverson time to process the serious information. Allowed time for Mrs. Iverson to cry. She shared that they have been  for 38 years, dated for about 8 years before deciding to get  when they were 25 years old. He had always expressed that he wanted to live, and she said that maybe this is a different scenario. I agreed with her, that in the past, it made a lot of sense to comply with all the treatments and tests that he went through with the doctors in efforts to buy more time, and that now unfortunately we are in a different place. She asked what can we do. I shared that in knowing that time is short (likely short weeks) that we can do our best to make sure every living day he has on earth is spent out of pain and suffering. Discussed using hospice support during this time to ensure that he has the right medical team to help alleviate his suffering. For a long time we talked about home with hospice versus nursing home with hospice. She feels torn because she is not strong enough to change him in bed, but wonders if she's being cruel by not bringing him home. Normalized that " many people do both - bring people home and engage their community to help or utilize nursing home, and that there are no wrong choices. Offered that we can at least explore the cost of nursing home (as this is a questions she had) and get more information for her and her family to make the final decision.  - I did share in my recommendations for comfort, that we protect Mr. Iverson from interventions that would cause more pain/suffering than help. Discussed that interventions like CPR/intubation will not help. I recommended that in efforts to allow Mr. Iverson a natural and peaceful death, that we protect him from these interventions, which means a DNR code status. She agreed. She asked if I would share this with Dr. Guevara as well.  - Agreed to start utilizing medications for pain/anxiety so that he doesn't spend another day suffering. Will continue to follow along to support Mrs. Iverson and Mr. Iverson during this very heartbreaking time.            Thank you for your consult. I will follow-up with patient. Please contact us if you have any additional questions.    Subjective:     HPI:   Kashif Iverson is a 62-year-old man with a history of stage IV NSCLC with metastasis to the bone s/p palliative chemo/radiation, chronic hypoxic respiratory failure 2/2 COPD on supplemental oxygen, HFpEF, atrial fibrillation on apixaban, PAD who was admitted after presenting with an unwitnessed fall and acute LUE paralysis. He was transferred from [facility] to [facility] for evaluation of new LUE weakness in setting of metastatic disease/injury. Palliative and Supportive Care was consulted to explore goals of care.      Hospital Course:  No notes on file    Interval History: Remains disoriented. PT/OT worked with Mr. Iverson, requiring a lot of assistance. EEG in place during encounter. Met with wife at bedside and spoke in separate area.    Past Medical History:   Diagnosis Date    Chronic obstructive pulmonary disease with  acute exacerbation 9/5/2022    Combined systolic and diastolic heart failure     Dependence on supplemental oxygen 5/24/2022    History of completed stroke 9/3/2019     09/03/2019 On CT exam    History of non-ST elevation myocardial infarction (NSTEMI) 2/22/2022    Hypertension     Lung cancer     NSCLC    Lung mass     left lung    Macrocytic anemia 8/24/2019    PAD (peripheral artery disease) 3/14/2022    LUIS FELIPE 3/11/22    Peripheral artery disease        Past Surgical History:   Procedure Laterality Date    BRONCHOSCOPY N/A 08/30/2019    Procedure: Bronchoscopy;  Surgeon: Miguel Diagnostic Provider;  Location: [facility] OR [address] [place] [address];  Service: Anesthesiology;  Laterality: N/A;    CORONARY ANGIOGRAPHY N/A 03/04/2022    Procedure: ANGIOGRAM, CORONARY ARTERY;  Surgeon: Robson Green MD;  Location: [facility] CATH LAB;  Service: Cardiology;  Laterality: N/A;    INSERTION OF TUNNELED CENTRAL VENOUS CATHETER (CVC) WITH SUBCUTANEOUS PORT      MEDIPORT REMOVAL Right 3/21/2023    Procedure: REMOVAL, CATHETER, CENTRAL VENOUS, TUNNELED, WITH PORT;  Surgeon: Eddi Hernandez MD;  Location: [facility] OR;  Service: General;  Laterality: Right;       Review of patient's allergies indicates:  No Known Allergies    Medications:  Continuous Infusions:   lactated ringers 75 mL/hr at 08/22/23 1459     Scheduled Meds:   albuterol-ipratropium  3 mL Nebulization Q6H WAKE    apixaban  5 mg Oral BID    aspirin  81 mg Oral Daily    atorvastatin  80 mg Oral Daily    cilostazoL  100 mg Oral BID    folic acid  1 mg Oral Daily    metoprolol tartrate  50 mg Oral TID    mupirocin   Nasal BID    OLANZapine zydis  5 mg Oral QHS    polyethylene glycol  17 g Oral Daily    potassium chloride SA  20 mEq Oral Daily    senna-docusate 8.6-50 mg  1 tablet Oral BID    tiotropium bromide  2 puff Inhalation Daily     PRN Meds:acetaminophen, melatonin, naloxone, ondansetron, oxyCODONE, prochlorperazine, sodium chloride 0.9%    Family History        Problem Relation (Age of Onset)    Cancer Father, Sister    Diabetes Mother    Heart defect Mother    No Known Problems Son          Tobacco Use    Smoking status: Former     Current packs/day: 0.00     Average packs/day: 1 pack/day for 25.0 years (25.0 ttl pk-yrs)     Types: Cigarettes     Start date: 1995     Quit date: 2020     Years since quitting: 3.6    Smokeless tobacco: Never   Substance and Sexual Activity    Alcohol use: Yes     Comment: 11/3/22: Occ.    Drug use: Never    Sexual activity: Yes     Partners: Female       Review of Systems   Unable to perform ROS: Mental status change     Objective:     Vital Signs (Most Recent):  Temp: 98.4 °F (36.9 °C) (08/23/23 1214)  Pulse: 88 (08/23/23 0730)  Resp: 18 (08/23/23 0730)  BP: 109/68 (08/23/23 0730)  SpO2: (!) 94 % (08/23/23 0730) Vital Signs (24h Range):  Temp:  [97.8 °F (36.6 °C)-98.4 °F (36.9 °C)] 98.4 °F (36.9 °C)  Pulse:  [82-96] 88  Resp:  [17-20] 18  SpO2:  [94 %-100 %] 94 %  BP: (101-109)/(62-75) 109/68     Weight: 66.5 kg (146 lb 9.7 oz)  Body mass index is 21.04 kg/m².       Physical Exam  Constitutional:       Appearance: He is ill-appearing.   HENT:      Head: Normocephalic and atraumatic.      Right Ear: External ear normal.      Left Ear: External ear normal.      Nose: Nose normal.      Mouth/Throat:      Mouth: Mucous membranes are dry.   Eyes:      Conjunctiva/sclera: Conjunctivae normal.   Cardiovascular:      Rate and Rhythm: Normal rate.   Pulmonary:      Breath sounds: Wheezing present.   Abdominal:      General: Bowel sounds are normal.      Palpations: Abdomen is soft.   Musculoskeletal:         General: Tenderness present.   Lymphadenopathy:      Cervical: No cervical adenopathy.   Skin:     General: Skin is dry.   Neurological:      Mental Status: He is disoriented.            Review of Symptoms      Symptom Assessment (ESAS 0-10 Scale)  Unable to complete assessment due to Mental status change         Pain Assessment in  Advanced Demential Scale (PAINAD)   Breathing - Independent of vocalization:  1  Negative vocalization:  0  Facial expression:  1  Body language:  1  Consolability:  1  Total:  4    Living Arrangements:  Lives with spouse    Psychosocial/Cultural:   See Palliative Psychosocial Note: No   to wife for 38 years  **Primary  to Follow**  Palliative Care  Consult: No    Spiritual:  F - Noa and Belief:  Shinto - raised Restorationist, but attended Buddhist Spiritism with wife once they got   A - Address in Care:  Engaged spiritual support        Advance Care Planning  Advance Directives:   Living Will: No    LaPOST: No    Do Not Resuscitate Status: Yes    Medical Power of : No      Decision Making:  Family answered questions and Patient unable to communicate due to disease severity/cognitive impairment  Goals of Care: The family endorses that what is most important right now is to focus on comfort and QOL     Accordingly, we have decided that the best plan to meet the patient's goals includes enrolling in hospice care           Significant Labs: CBC:   Recent Labs   Lab 08/22/23  0639 08/23/23  0459   WBC 15.71* 13.65*   HGB 12.8* 12.5*   HCT 37.6* 37.7*    237     CMP:   Recent Labs   Lab 08/21/23  2229 08/22/23  0639 08/23/23  0459   * 136 135*   K 4.0 3.4* 3.6   CL 91* 96 97   CO2 30* 30* 27   * 116* 95   BUN 12 10 10   CREATININE 0.8 0.7 0.6   CALCIUM 8.8 8.7 8.5*   PROT  --  6.1 5.9*   ALBUMIN  --  2.3* 2.2*   BILITOT  --  0.8 0.7   ALKPHOS  --  122 116   AST  --  35 38   ALT  --  12 13   ANIONGAP 14 10 11     CBC:   Recent Labs   Lab 08/23/23  0459   WBC 13.65*   HGB 12.5*   HCT 37.7*   MCV 86        BMP:  Recent Labs   Lab 08/23/23  0459   GLU 95   *   K 3.6   CL 97   CO2 27   BUN 10   CREATININE 0.6   CALCIUM 8.5*   MG 1.7     LFT:  Lab Results   Component Value Date    AST 38 08/23/2023    ALKPHOS 116 08/23/2023    BILITOT 0.7 08/23/2023      Albumin:   Albumin   Date Value Ref Range Status   08/23/2023 2.2 (L) 3.5 - 5.2 g/dL Final     Protein:   Total Protein   Date Value Ref Range Status   08/23/2023 5.9 (L) 6.0 - 8.4 g/dL Final     Lactic acid:   Lab Results   Component Value Date    LACTATE 2.2 08/20/2023    LACTATE 3.7 (HH) 03/16/2023       Significant Imaging: I have reviewed all pertinent imaging results/findings within the past 24 hours.        I spent a total of 75 minutes on the day of the visit. This includes face to face time in discussion of goals of care, symptom assessment, coordination of care and emotional support. This also includes non-face to face time preparing to see the patient (eg, review of tests/imaging), obtaining and/or reviewing separately obtained history, documenting clinical information in the electronic or other health record, independently interpreting results and communicating results to the patient/family/caregiver, or care coordinator. A total of 16 minutes was spent on advance care planning, goals of care discussion, emotional support, formulating and communicating prognosis and goals of care, exploring burden/benefit of various approaches of treatment. This discussion occurred on a fully voluntary basis with the verbal consent of the patient and/or family.        Kacie Huston MD  Palliative Medicine  [facility] - Intensive Care ([place] [address])

## 2023-08-23 NOTE — ASSESSMENT & PLAN NOTE
· Metastatic SCC of lung with extensive mets s/p palliative chemoradiation  · CTA head with no acute abnormality. No large vessel occlusion. Right basal ganglia lacunar infarct.  Right frontal and right parietal encephalomalacia changes again seen.   · Neurology consulted  · Delirium precautions  · Ammonia, B12, TSH WNL. Folate deficient, will supplement  · MRI brachial plexus showing large mass with extensive invasion/mets  · MRI brain and C/T/L spine negative for mets but poor study given motion  · EEG negative for seizures but with moderate/severe encephalopathy

## 2023-08-23 NOTE — PROGRESS NOTES
[facility] ([facility]  Neurology  Progress Note    Patient Name: Kashif Iverson  MRN: [id number]  Admission Date: 8/20/2023  Hospital Length of Stay: 2 days  Code Status: DNR   Attending Provider: Parmjit Guevara MD  Primary Care Physician: Eduardo Ruiz MD   Principal Problem:Encephalopathy, metabolic    HPI:   62 y.o. male with HTN, PAD, CAD, Afib (Eliquis), HF, COPD, lung squamous cell carcinoma with bony mets s/p palliative chemoradiation presented to [facility] ED 8/21/23 after a fall the day prior to arrival. Family reported decreased PO intake days leading up to fall. Pt reportedly slipped and fell in the bathroom. Fall was unwitnessed so wife unaware of mechanism of fall. He was complaining of LUE pain following fall and noted to have weakness. Wife also concerned about an episode of urinary incontinence, which is a new issue as well. On EMS arrival, he was hypotensive and unable to stand unassisted or ambulate. He was complaining of back, leg pain and new LUE flaccid paralysis. CTA head/neck showed remote R basal ganglia lacunar infarct and R frontal/parietal encephalomalacia. L apical neoplastic lesion causing osseous destruction to left 1st medial clavicle, 1st anterior rib and pulmonary masses. Labs remarkable for critical hypokalemia (2.5) that improved with repletion. Neurology consulted (8/22/23) for LUE paralysis. Prior to this fall, wife reports patient was independent and ambulatory. Now he will not stretch out his legs due to back pain and only wiggling his fingers on left hand.     Subjective:     Interval History:   MRI Neuro-axis and brachial plexus completed revealing a large, partially necrotic mass in L supraclavicular region with mediastinal, L upper lobe and L paravertebral extension with invasion/destruction of L clavicle, L 1st rib and C6 to T2 vertebral bodies. Lesion encases the right brachiocephalic, left common carotid and left subclavian arteries/veins and encases  the C3 through T2 nerve roots, trunks, divisions and cords of the left brachial plexus. Osseous metastatic lesions in cervicothoracic spine with pathologic fractures C7, T2 and T4 vertebral bodies and multifocal epidural tumor extension.   Still encephalopathic, EEG completed today, awaiting official read and report   Palliative care having GOC discussions with family, code status changed to DNR    Current Facility-Administered Medications   Medication Dose Route Frequency Provider Last Rate Last Admin    acetaminophen tablet 650 mg  650 mg Oral Q4H PRN Toy Cristina MD        albuterol-ipratropium 2.5 mg-0.5 mg/3 mL nebulizer solution 3 mL  3 mL Nebulization Q6H WAKE Valente Nava MD   3 mL at 08/23/23 1336    apixaban tablet 5 mg  5 mg Oral BID Valente Nava MD   5 mg at 08/23/23 1145    aspirin EC tablet 81 mg  81 mg Oral Daily Valente Naav MD   81 mg at 08/23/23 1145    atorvastatin tablet 80 mg  80 mg Oral Daily Toy Cristina MD   80 mg at 08/23/23 1145    cilostazoL tablet 100 mg  100 mg Oral BID Toy Cristina MD   100 mg at 08/23/23 1145    folic acid tablet 1 mg  1 mg Oral Daily HandParmjit MD   1 mg at 08/23/23 1145    lactated ringers infusion   Intravenous Continuous HandParmjit MD 75 mL/hr at 08/22/23 1459 New Bag at 08/22/23 1459    melatonin tablet 6 mg  6 mg Oral Nightly PRN Toy Cristina MD        metoprolol tartrate (LOPRESSOR) tablet 50 mg  50 mg Oral TID Toy Cristina MD   50 mg at 08/23/23 1145    mupirocin 2 % ointment   Nasal BID Parmjit Guevara MD        naloxone 0.4 mg/mL injection 0.02 mg  0.02 mg Intravenous PRN Toy Cristina MD        OLANZapine zydis disintegrating tablet 5 mg  5 mg Oral QHS Toy Cristina MD   5 mg at 08/22/23 2100    ondansetron injection 4 mg  4 mg Intravenous Q8H PRN Toy Cristina MD        oxyCODONE immediate release tablet Tab 10 mg  10 mg Oral Q4H PRN Kacie Huston,  MD        polyethylene glycol packet 17 g  17 g Oral Daily Toy Cristina MD   17 g at 08/22/23 0917    potassium chloride SA CR tablet 20 mEq  20 mEq Oral Daily Toy Cristina MD   20 mEq at 08/23/23 1145    prochlorperazine injection Soln 5 mg  5 mg Intravenous Q6H PRN Toy Cristina MD        senna-docusate 8.6-50 mg per tablet 1 tablet  1 tablet Oral BID Toy Cristina MD   1 tablet at 08/23/23 1145    sodium chloride 0.9% flush 10 mL  10 mL Intravenous Q6H PRN Toy Cristina MD        tiotropium bromide 2.5 mcg/actuation inhaler 2 puff  2 puff Inhalation Daily Toy Cristina MD         Facility-Administered Medications Ordered in Other Encounters   Medication Dose Route Frequency Provider Last Rate Last Admin    heparin, porcine (PF) 100 unit/mL injection flush 500 Units  500 Units Intravenous PRN Sameera Amador MD   500 Units at 01/20/23 1652    sodium chloride 0.9% flush 10 mL  10 mL Intravenous PRN Sameera Amador MD   10 mL at 01/20/23 1652     Review of Systems   Unable to perform ROS: Mental status change   Constitutional:  Positive for activity change and fatigue. Negative for fever.   Respiratory:  Positive for shortness of breath.    Musculoskeletal:  Positive for back pain and gait problem.   Allergic/Immunologic: Positive for immunocompromised state.   Psychiatric/Behavioral:  Positive for confusion, decreased concentration and sleep disturbance.      Objective:     Vital Signs (Most Recent):  Temp: 98.4 °F (36.9 °C) (08/23/23 1214)  Pulse: 81 (08/23/23 1336)  Resp: 17 (08/23/23 1336)  BP: 107/66 (08/23/23 1214)  SpO2: 99 % (08/23/23 1336) Vital Signs (24h Range):  Temp:  [97.8 °F (36.6 °C)-98.4 °F (36.9 °C)] 98.4 °F (36.9 °C)  Pulse:  [80-96] 81  Resp:  [17-20] 17  SpO2:  [94 %-100 %] 99 %  BP: (101-109)/(62-75) 107/66     Weight: 66.5 kg (146 lb 9.7 oz)  Body mass index is 21.04 kg/m².     Physical Exam  Neurological:      Coordination: Finger-nose-finger  test: not following command.          NEUROLOGICAL EXAMINATION:     MENTAL STATUS   Attention: decreased. Concentration: decreased.   Level of consciousness: drowsy ,  arousable by verbal stimuli    MOTOR EXAM   Muscle bulk: decreased       Decreased ROM BLE felt secondary to pain   Wiggles fingers on L hand, but no spontaneous movement against gravity of proximal UE     GAIT AND COORDINATION     Gait  Gait: (deferred)     Coordination   Finger-nose-finger test: not following command.    Significant Labs: All pertinent lab results from the past 24 hours have been reviewed.    EEG (8/23/23) report pending    Significant Imaging: I have reviewed all pertinent imaging results/findings within the past 24 hours.    MRI Brain W WO contrast (report pending)    MRI Cervical/Thoracic/Lumbar spine W WO contrast (8/23/23):  Abnormal marrow signal and enhancement within the C5 through T2 vertebral bodies likely related to neoplastic infiltration from left supraclavicular mass lesion as seen on CT.Poor visualization of superimposed pathological fracture of C7 and T2 vertebra better seen on prior CT. Allowing for motion artifacts there is no definite abnormal intrathecal enhancement. There is additional abnormal marrow signal and enhancement within the left T3 vertebral body posteriorly and more diffusely involving the T4 vertebra concerning for additional neoplastic involvement.  Please note T4 component with probable extension to the epidural space at this level with at least mild encroachment on the central canal. There is questionable edema and enhancement within the sacrum at the S2 level cannot exclude additional area neoplastic involvement. Please note there is inclusion of left supraclavicular mass which in cases the left carotid artery partially within the field of view and distorted by motion artifacts.    MRI Brachial plexus (8/23/23):  1. Large partially necrotic mass centered within the left supraclavicular region  with associated mediastinal, left upper lobe and left paravertebral extension and associated invasion/destruction of the left clavicle, left 1st rib and possibly the adjacent C6 through T2 vertebral bodies.  Lesion encases the right brachiocephalic, left common carotid and left subclavian arteries/veins and encases the C3 through T2 nerve roots, trunks, divisions and cords of the left brachial plexus.  2. 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  3. Osseous metastatic lesions of the cervicothoracic spine with associated pathologic fractures of the C7, T2 and T4 vertebral bodies and multifocal epidural tumor extension, better evaluated on same date spine MRI.  4. Posttherapy changes of the left upper lobe with multiple bilateral pulmonary nodules, incompletely evaluated on this exam.  5. Mediastinal and axillary lymphadenopathy, incompletely evaluated on this exam.    Assessment and Plan:     Left arm weakness  62 y.o. male with HTN, PAD, CAD, Afib (Eliquis), HFpEF, COPD, lung cancer with bony mets s/p palliative chemoradiation presented to ED 8/21/23 after a fall the day prior to arrival. On EMS arrival, he was hypotensive and unable to stand unassisted or ambulate. He was complaining of back and leg pain and new LUE flaccid paralysis. CTA head/neck showed remote R basal ganglia lacunar infarct and R frontal/parietal encephalomalacia. L apical neoplastic lesion causing osseous destruction to left 1st medial clavicle, 1st anterior rib and pulmonary masses. Labs remarkable for critical hypokalemia (2.5) that improved with repletion. Neurology consulted (8/22/23) for LUE paralysis.     8/23-MRI neuro-axis and L brachial plexus completed unfortunately revealing numerous metastatic lesions contributing to weakness and pain  EEG completed given ongoing encephalopathy, read pending  Palliative having GOC discussions with pt wife, code  changed    Recommendations:  --continue supportive/palliative care   --will f/u EEG read and call back if abnormal/any additional recs    VTE Risk Mitigation (From admission, onward)         Ordered     apixaban tablet 5 mg  2 times daily         08/21/23 1220              Neurology will sign off. Please call with any questions/clarifications    Vane Langley PA-C  General Neurology Consult

## 2023-08-23 NOTE — ASSESSMENT & PLAN NOTE
Advance Care Planning     Date: 08/23/2023    Code Status  In light of the patients advanced and life limiting illness,I engaged the the family in a voluntary conversation about the patient's preferences for care at the very end of life. The patient wishes to continue with life prolonging measures at this time.  Along those lines, the patient does wish to have CPR or other invasive treatments performed when his heart and/or breathing stops. I communicated to the family that patient will remain FULL code at this time.  I spent a total of 15 minutes engaging the patient in this advance care planning discussion.       [facility] [place]  I engaged the family in a voluntary conversation about advance care planning and we specifically addressed what the goals of care would be moving forward, in light of the patient's change in clinical status, specifically worsening metastatic lung cancer.  We did specifically address the patient's likely prognosis, which is poor.  We explored the patient's values and preferences for future care.  The family endorses that what is most important right now is to focus on curative/life-prolongation (regardless of treatment burdens)    Accordingly, we have decided that the best plan to meet the patient's goals includes continuing with treatment    I did not explain the role for hospice care at this stage of the patient's illness, including its ability to help the patient live with the best quality of life possible.  We will not be making a hospice referral.    I spent a total of 15 minutes engaging the patient in this advance care planning discussion.

## 2023-08-23 NOTE — ASSESSMENT & PLAN NOTE
Has extensive metastasis  Chart review shows patient not candidate for further treatment. Case discussed with heme/onc here who are in agreement.  Palliative care consulted

## 2023-08-23 NOTE — ASSESSMENT & PLAN NOTE
Metastatic SCC of lung with extensive mets s/p palliative chemoradiation  CTA head with no acute abnormality. No large vessel occlusion. Right basal ganglia lacunar infarct.  Right frontal and right parietal encephalomalacia changes again seen.   Neurology consulted  Delirium precautions  Ammonia, B12, TSH WNL. Folate deficient, will supplement  MRI brachial plexus showing large mass within the left supraclavicular region with associated mediastinal, left upper lobe and left paravertebral extension and associated invasion/destruction of the left clavicle, left 1st rib and possibly the adjacent C6 through T2 vertebral bodies.   MRI brain and C/T/L spine completed, awaiting read  EEG pending  MRI findings discussed with Natty (wife). Very poor prognosis. Would benefit from comfort care.  Palliative care consulted

## 2023-08-23 NOTE — HPI
Kashif Iverson is a 62-year-old man with a history of stage IV NSCLC with metastasis to the bone s/p palliative chemo/radiation, chronic hypoxic respiratory failure 2/2 COPD on supplemental oxygen, HFpEF, atrial fibrillation on apixaban, PAD who was admitted after presenting with an unwitnessed fall and acute LUE paralysis. He was transferred from [facility] to [facility] for evaluation of new LUE weakness in setting of metastatic disease/injury. Palliative and Supportive Care was consulted to explore goals of care.

## 2023-08-23 NOTE — PLAN OF CARE
Problem: Adult Inpatient Plan of Care  Goal: Absence of Hospital-Acquired Illness or Injury  Outcome: Ongoing, Progressing  Goal: Optimal Comfort and Wellbeing  Outcome: Ongoing, Progressing     Problem: Fall Injury Risk  Goal: Absence of Fall and Fall-Related Injury  Outcome: Ongoing, Progressing     Problem: Skin Injury Risk Increased  Goal: Skin Health and Integrity  Outcome: Ongoing, Progressing      Pt slept well this shift. A&Ox 1. On 2L NC. VSS. Wife, Natty, at bedside. IV fluids maintained. PT had several BM this shift. Safety precautions in place. Call light and personal items in reach. No further concerns noted at this time. NADN this shift. Will continue to monitor.

## 2023-08-23 NOTE — ASSESSMENT & PLAN NOTE
· MRI brachial plexus showing large mass within the left supraclavicular region with associated mediastinal, left upper lobe and left paravertebral extension and associated invasion/destruction of the left clavicle, left 1st rib and possibly the adjacent C6 through T2 vertebral bodies.

## 2023-08-24 NOTE — PLAN OF CARE
Problem: Coping Ineffective  Goal: Effective Coping  Outcome: Ongoing, Not Progressing     Problem: Adult Inpatient Plan of Care  Goal: Plan of Care Review  Outcome: Ongoing, Not Progressing  Goal: Patient-Specific Goal (Individualized)  Outcome: Ongoing, Not Progressing  Goal: Absence of Hospital-Acquired Illness or Injury  Outcome: Ongoing, Not Progressing  Goal: Optimal Comfort and Wellbeing  Outcome: Ongoing, Not Progressing  Goal: Readiness for Transition of Care  Outcome: Ongoing, Not Progressing     Problem: Fall Injury Risk  Goal: Absence of Fall and Fall-Related Injury  Outcome: Ongoing, Not Progressing     Problem: Skin Injury Risk Increased  Goal: Skin Health and Integrity  Outcome: Ongoing, Not Progressing     Problem: Impaired Wound Healing  Goal: Optimal Wound Healing  Outcome: Ongoing, Not Progressing     Problem: Infection  Goal: Absence of Infection Signs and Symptoms  Outcome: Ongoing, Not Progressing

## 2023-08-24 NOTE — NURSING
2016- Notified Dr. Cristina via secure chat BP 95/61 MAP 73 HR 83. Lopressor held.     0530- Pt slept most of the night. Oriented to person only. VSS. On 2L NC. PRN Oxy given x1. Safety measures in place.

## 2023-08-24 NOTE — PLAN OF CARE
08/24/23 0824   Post-Acute Status   Post-Acute Authorization Placement   Post-Acute Placement Status Patient List Provided   Coverage MEDICARE - MEDICARE PART A & B   Patient choice form signed by patient/caregiver List from CMS Compare   Discharge Plan   Discharge Plan A Skilled Nursing Facility   Discharge Plan B Home Health     CM provided the patients spouse with a list of SNF. SNF recommended by therapy.    3:30 pm   CM left VM for spouse to call and discuss post acute needs    5:09 pm  Cm spoke with spouse, Natty and will discussed post acute needs and will be here tomorrow to discuss appropriate level care.       Rosmery Cordoba RN  Case Management  [facility]  [phone]

## 2023-08-24 NOTE — ASSESSMENT & PLAN NOTE
Kashif Iverson is a 62-year-old man with a history of stage IV NSCLC with metastasis to the bone s/p palliative chemo/radiation, chronic hypoxic respiratory failure 2/2 COPD on supplemental oxygen, HFpEF, atrial fibrillation on apixaban, PAD who was admitted after presenting with an unwitnessed fall and acute LUE paralysis. He was transferred from [facility] to [facility] for evaluation of new LUE weakness in setting of metastatic disease/injury. Palliative and Supportive Care was consulted to explore goals of care.    Advance Care Planning   Goals of Care:  - Code status: DNAR/DNI  - Next of kin: wife Natty Iverson, [phone]  - ACP documents: none  - Patient does not have decision making capacity  - Prognosis: poor  - Family's understanding of prognosis: fair, will immensely benefit from continued counseling about expectations  - Plans: please engage case management to explore possibility of nursing home with hospice support. Finances will play an important role in deciding whether she can afford to get him to nursing home with hospice support vs bringing him home with hospice support. Family benefits from guidance about next best steps in order to honor wishes to be comfortable at this time.    Goals of Care Conversation:  - 23: Met Mr. Iverson, who answers my questions, but struggles to identify if he's in pain.  reviewed, and prescribed oxycodone 15 mg dose. I called his wife Mrs. Iverson who is currently at home, having to pay bills and run errands. She shared that she consulted her sister-in-law, brother-in-law and her own siblings about what to do. She shared that her sister-in-law guilted her, saying that she should've put Mr. Iverson in hospice a long time ago and he could've  peacefully in his sleep. Her brother-in-law was more supportive, saying that if he needs to be in a nursing home, then she should have no guilt about this. She asked me if at the nursing home would they continue his  "magnesium, potassium, pain medications and I said that as long as a doctor orders it and Mr. Iverson is willing to take it, yes. I did clarify that his vitamins and electrolytes are not going to make him live longer because his cancer is what is taking over his body and ending his life. She asked me if the pain medicines are given just to put people to sleep. I said that is not the case. That we give pain medicine only because there is severe pain to treat. I shared that a lot of people see that people fall asleep or get drowsy after taking pain medicine because that is an expected side effect. But that we can either not give Mr. Iverson pain medicine and he scream in pain for the rest of his life, or we allow him to have medicines that decrease his pain, so that the rest of his life is spent in comfort. She agreed with the latter. At this time, she is still filled with guilt about bringing him to a nursing home vs bringing him home with hospice. Ideally if insurance will cover the cost of the nursing home stay, she'd choose this. I shared that PT recommended SNF, and there is a chance his insurance will cover this. If he does SNF and doesn't do well (which is what we will expect over time as his body gets weaker), then we can make the transition to hospice support at that time, as his insurance will only cover one service or the other, but not both at the same time. She will be here this afternoon to take a look at the SNF list, and  will see if his insurance will cover it. Will continue to follow along.  - 8/23/23: I met Mr. Iverson, who is encephalopathic and disoriented. His wife is at bedside. I spoke with her in a separate room and she shares her fears and worries that he is dying. Validated her sadness and fears. She received a call from the attending hospitalist, who shared the unfortunate results of the MRI. She cried and asked, "Is he dying?" He replied, "Yes, he is dying. Not today, and not in the " "next days." He explained that he spoke with Oncology and confirmed that there are no treatment options available. She asked if there was a role in radiation to shrink his tumor. He shared that while he can get in touch with Rad/Onc to ask, that he worried that even this will not fix his advanced cancer and not change the trajectory. I spoke briefly with Dr. Guevara and agreed to hang up the phone for now to allow Mrs. Iverson time to process the serious information. Allowed time for Mrs. Iverson to cry. She shared that they have been  for 38 years, dated for about 8 years before deciding to get  when they were 25 years old. He had always expressed that he wanted to live, and she said that maybe this is a different scenario. I agreed with her, that in the past, it made a lot of sense to comply with all the treatments and tests that he went through with the doctors in efforts to buy more time, and that now unfortunately we are in a different place. She asked what can we do. I shared that in knowing that time is short (likely short weeks) that we can do our best to make sure every living day he has on earth is spent out of pain and suffering. Discussed using hospice support during this time to ensure that he has the right medical team to help alleviate his suffering. For a long time we talked about home with hospice versus nursing home with hospice. She feels torn because she is not strong enough to change him in bed, but wonders if she's being cruel by not bringing him home. Normalized that many people do both - bring people home and engage their community to help or utilize nursing home, and that there are no wrong choices. Offered that we can at least explore the cost of nursing home (as this is a questions she had) and get more information for her and her family to make the final decision.  - I did share in my recommendations for comfort, that we protect Mr. Iverson from interventions that would cause more " pain/suffering than help. Discussed that interventions like CPR/intubation will not help. I recommended that in efforts to allow Mr. Iverson a natural and peaceful death, that we protect him from these interventions, which means a DNR code status. She agreed. She asked if I would share this with Dr. Guevara as well.  - Agreed to start utilizing medications for pain/anxiety so that he doesn't spend another day suffering. Will continue to follow along to support Mrs. Iverson and Mr. Iverson during this very heartbreaking time.       Neoplasm Related Pain  -  reviewed: was prescribed 30 days of oxycodone 15 mg  - Start MS Contin 15 mg PO BID  - Continue oxycodone IR 10 mg PO q4h PRN severe pain, dyspnea    Dyspnea  - Continue supplemental oxygen for dyspnea relief  - Oxycodone IR 10 mg PO q4h PRN severe pain, dyspnea    Anxiety/Agitation  - Continue home olanzapine ODT 5 mg PO qHS  - Added lorazepam 1 mg PO q4h PRN anxiety

## 2023-08-24 NOTE — PROGRESS NOTES
[facility] - Intensive Care ([place] [id number])  [facility] Medicine  Progress Note    Patient Name: Kashif Iverson  MRN: [id number]  Patient Class: IP- Inpatient   Admission Date: 8/20/2023  Length of Stay: 3 days  Attending Physician: Celine Love MD  Primary Care Provider: Eduardo Ruiz MD        Subjective:     Principal Problem:Encephalopathy, metabolic        HPI:  Kashif Iverson is a 62 y.o. male, with a PMHx of PAD, diastolic CHF, COPD, non-small cell lung cancer who presents to the ED s/p fall on yesterday. Pt family reports pt slipped and fell in bathroom yesterday and afterwards was walking fine. EMS reports hypotensive bp 80/40 and heart rate 40-60 irregular. Pt given 500ml of NS per EMS. Pt placed on 2L of O2. On Today pt can't stand on his own, unable to ambulate. Pt reports leg and back pain. Leg pain is exacerbated with movement. Pt reports noticing flaccid left arm on yesterday. Patient denies lightheadedness, dizziness, or other associated symptoms. This is the extent of the patient's complaints today in the Emergency Department.CTA head,neck show,Right basal ganglia lacunar infarct.  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  Associated attenuation of the left proximal subclavian and left common carotid artery.  Pulmonary masses.   patient has severe  hypokalemia 2.4,being replaced po and IV,patient seems confused.transfer center [place] [facility] [place]pending transfer,      Overview/Hospital Course:  Mr. Iverson was admitted to Hospital Medicine for management of a fall with leg/back pain as well as flaccid left arm.  His mentation worsened.  MRI brachial plexus showing large mass within the left supraclavicular region with extesive metastasis. MRI brain and C/T/L spine were negative for mets, but was a poor study 2/2  motion.  EEG was consistent with a moderate to severe diffuse encephalopathy, but no seizures.  Chart review shows patient not candidate for further treatment. Case discussed with [facility] here who are in agreement. Palliative care consulted. Family requesting DNR and home vs NH with hospice.      Interval History: No acute events overnight.  Palliative Care consulted. DNR noted.  Working on NH with hospice, per wife request.    Review of Systems   Unable to perform ROS: Mental status change     Objective:     Vital Signs (Most Recent):  Temp: 97.7 °F (36.5 °C) (08/24/23 0734)  Pulse: 101 (08/24/23 0900)  Resp: 18 (08/24/23 0900)  BP: 108/70 (08/24/23 0734)  SpO2: (!) 94 % (08/24/23 0900) Vital Signs (24h Range):  Temp:  [97.7 °F (36.5 °C)-98.4 °F (36.9 °C)] 97.7 °F (36.5 °C)  Pulse:  [] 101  Resp:  [16-18] 18  SpO2:  [93 %-99 %] 94 %  BP: ()/(61-70) 108/70     Weight: 66.5 kg (146 lb 9.7 oz)  Body mass index is 21.04 kg/m².    Intake/Output Summary (Last 24 hours) at 8/24/2023 1111  Last data filed at 8/24/2023 0745  Gross per 24 hour   Intake 120 ml   Output --   Net 120 ml         Physical Exam  Constitutional:       Appearance: He is well-developed. He is ill-appearing.   HENT:      Head: Normocephalic and atraumatic.   Cardiovascular:      Rate and Rhythm: Normal rate and regular rhythm.      Heart sounds: No murmur heard.  Pulmonary:      Effort: Pulmonary effort is normal. No respiratory distress.      Breath sounds: Normal breath sounds. No wheezing or rales.   Abdominal:      General: There is no distension.      Palpations: Abdomen is soft.      Tenderness: There is no abdominal tenderness.   Musculoskeletal:         General: No deformity.   Skin:     General: Skin is warm.   Neurological:      Mental Status: He is alert. He is disoriented.             Significant Labs: All pertinent labs within the past 24 hours have been reviewed.  CBC:   Recent Labs   Lab 08/23/23  0459 08/24/23  0440   WBC  13.65* 16.74*   HGB 12.5* 13.5*   HCT 37.7* 42.1    241     CMP:   Recent Labs   Lab 08/23/23  0459 08/24/23  0441   * 136   K 3.6 4.0   CL 97 100   CO2 27 23   GLU 95 94   BUN 10 11   CREATININE 0.6 0.6   CALCIUM 8.5* 8.7   PROT 5.9* 6.2   ALBUMIN 2.2* 2.3*   BILITOT 0.7 0.9   ALKPHOS 116 120   AST 38 51*   ALT 13 15   ANIONGAP 11 13       Significant Imaging: I have reviewed all pertinent imaging results/findings within the past 24 hours.      Assessment/Plan:      * Encephalopathy, metabolic  · Metastatic SCC of lung with extensive mets s/p palliative chemoradiation  · CTA head with no acute abnormality. No large vessel occlusion. Right basal ganglia lacunar infarct.  Right frontal and right parietal encephalomalacia changes again seen.   · Neurology consulted  · Delirium precautions  · Ammonia, B12, TSH WNL. Folate deficient, will supplement  · MRI brachial plexus showing large mass with extensive invasion/mets  · MRI brain and C/T/L spine negative for mets but poor study given motion  · EEG negative for seizures but with moderate/severe encephalopathy    Squamous cell carcinoma of lung  · See above    Secondary malignant neoplasm of bone  · See above      Lung cancer, main bronchus, left  · Known SCC of the left lung with mets  · Per Onc, no further treatment options  · Palliative Care consulted      Fall  · Imaging negative for fracture  · Pain control with Palliative      ACP (advance care planning)  Advance Care Planning     Date: 08/23/2023    Code Status  In light of the patients advanced and life limiting illness,I engaged the the family in a voluntary conversation about the patient's preferences for care at the very end of life. The patient wishes to continue with life prolonging measures at this time.  Along those lines, the patient does wish to have CPR or other invasive treatments performed when his heart and/or breathing stops. I communicated to the family that patient will remain FULL  code at this time.  I spent a total of 15 minutes engaging the patient in this advance care planning discussion.       [facility]  I engaged the family in a voluntary conversation about advance care planning and we specifically addressed what the goals of care would be moving forward, in light of the patient's change in clinical status, specifically worsening metastatic lung cancer.  We did specifically address the patient's likely prognosis, which is poor.  We explored the patient's values and preferences for future care.  The family endorses that what is most important right now is to focus on curative/life-prolongation (regardless of treatment burdens)    Accordingly, we have decided that the best plan to meet the patient's goals includes continuing with treatment    I did not explain the role for hospice care at this stage of the patient's illness, including its ability to help the patient live with the best quality of life possible.  We will not be making a hospice referral.    I spent a total of 15 minutes engaging the patient in this advance care planning discussion.    Performed by Dr. Parnell Hand             Encounter for palliative care  · Palliative following      Left arm weakness  · MRI brachial plexus showing large mass within the left supraclavicular region with associated mediastinal, left upper lobe and left paravertebral extension and associated invasion/destruction of the left clavicle, left 1st rib and possibly the adjacent C6 through T2 vertebral bodies.        Persistent atrial fibrillation  · On OAC, will monitor.    COPD (chronic obstructive pulmonary disease)  · Chronic and stable  · On 2L NC  · Duoneb PRN        Coronary artery disease involving native coronary artery of native heart without angina pectoris  · Chronic and stable  · Cont aspirin, statin      PAD (peripheral artery disease)  · Chronic and stable  · Continue Aspirin and Statin      Chronic diastolic heart failure  · No signs of  decompensated HF  · Cont aspirin, metoprolol      Hypokalemia  · Resolved      Essential hypertension  · Chronic and stable  · Cont metoprolol      Bronchiectasis without complication  · Noted      Atherosclerosis of aorta  On medical treatments.      VTE Risk Mitigation (From admission, onward)         Ordered     apixaban tablet 5 mg  2 times daily         08/21/23 1220                Discharge Planning   [place]: 8/29/2023     Code Status: DNR   Is the patient medically ready for discharge?: Yes    Reason for patient still in hospital (select all that apply): Pending disposition  Discharge Plan A: Skilled Nursing Facility   Discharge Delays: None known at this time              Celine Love MD  Department of Hospital Medicine   [facility] - Intensive Care ([facility]

## 2023-08-24 NOTE — CHAPLAIN
"Palliative Care     Patient: Kashif Iverson  MRN: [id number]  : 1960  Age: 62 y.o.  Hospital Length of Stay: 3  Code Status: Code Status Discussion Note  Attending Provider: Celine Love MD  Principal Problem: Encephalopathy, metabolic    Visited pall med pt again today; he was alone, wife Natty not present.     Pt was more alert today and I could understand him when he spoke. His eyes remained closed most the time, but when I offered to pray for/over him pt said, "yes, you can pray for me." And after the prayer pt said "Amen, thank you." That was a big improvement from yesterday.    Provided compassionate presence and tender touch to pt and reminded him how much he's loved and being well taken care of. Told him I visited him yesterday and spent a lot of time with his beloved wife, Natty.    Debriefed with the bedside nurse as well.     Future (Spiritual Care Plan of Action):  Palliative  weill continue to follow. Lord, in your mercy.       **Spiritual Care Dept. Chaplains are available evenings, overnight and weekends **    In Peace,  Rev. Nicole Herrera MDiv, [facility]  Board Certified   Palliative Medicine Department  [phone]   "

## 2023-08-24 NOTE — SUBJECTIVE & OBJECTIVE
"Interval History: Was sleeping soundly this morning. Did have to be woken so he could turn and take breakfast/medications. Eyes closed in my encounter. I asked him if he was in pain and he said, "I'm not sure if it's pain or if it's chills."    Past Medical History:   Diagnosis Date    Chronic obstructive pulmonary disease with acute exacerbation 9/5/2022    Combined systolic and diastolic heart failure     Dependence on supplemental oxygen 5/24/2022    History of completed stroke 9/3/2019     09/03/2019 On CT exam    History of non-ST elevation myocardial infarction (NSTEMI) 2/22/2022    Hypertension     Lung cancer     NSCLC    Lung mass     left lung    Macrocytic anemia 8/24/2019    PAD (peripheral artery disease) 3/14/2022    LUIS FELIPE 3/11/22    Peripheral artery disease        Past Surgical History:   Procedure Laterality Date    BRONCHOSCOPY N/A 08/30/2019    Procedure: Bronchoscopy;  Surgeon: Miguel Diagnostic Provider;  Location: [address];  Service: Anesthesiology;  Laterality: N/A;    CORONARY ANGIOGRAPHY N/A 03/04/2022    Procedure: ANGIOGRAM, CORONARY ARTERY;  Surgeon: Robson Green MD;  Location: [facility] CATH LAB;  Service: Cardiology;  Laterality: N/A;    INSERTION OF TUNNELED CENTRAL VENOUS CATHETER (CVC) WITH SUBCUTANEOUS PORT      MEDIPORT REMOVAL Right 3/21/2023    Procedure: REMOVAL, CATHETER, CENTRAL VENOUS, TUNNELED, WITH PORT;  Surgeon: Eddi Hernandez MD;  Location: [facility];  Service: General;  Laterality: Right;       Review of patient's allergies indicates:  No Known Allergies    Medications:  Continuous Infusions:   lactated ringers 75 mL/hr at 08/23/23 2027     Scheduled Meds:   albuterol-ipratropium  3 mL Nebulization Q6H WAKE    apixaban  5 mg Oral BID    aspirin  81 mg Oral Daily    atorvastatin  80 mg Oral Daily    cilostazoL  100 mg Oral BID    folic acid  1 mg Oral Daily    metoprolol tartrate  25 mg Oral TID    mupirocin   Nasal BID    OLANZapine zydis  5 mg Oral QHS    polyethylene " glycol  17 g Oral Daily    potassium chloride SA  20 mEq Oral Daily    senna-docusate 8.6-50 mg  1 tablet Oral BID    tiotropium bromide  2 puff Inhalation Daily     PRN Meds:acetaminophen, LORazepam, melatonin, naloxone, ondansetron, oxyCODONE, prochlorperazine, sodium chloride 0.9%    Family History       Problem Relation (Age of Onset)    Cancer Father, Sister    Diabetes Mother    Heart defect Mother    No Known Problems Son          Tobacco Use    Smoking status: Former     Current packs/day: 0.00     Average packs/day: 1 pack/day for 25.0 years (25.0 ttl pk-yrs)     Types: Cigarettes     Start date: 1995     Quit date: 2020     Years since quitting: 3.6    Smokeless tobacco: Never   Substance and Sexual Activity    Alcohol use: Yes     Comment: 11/3/22: Occ.    Drug use: Never    Sexual activity: Yes     Partners: Female       Review of Systems   Unable to perform ROS: Mental status change     Objective:     Vital Signs (Most Recent):  Temp: 97.7 °F (36.5 °C) (08/24/23 0734)  Pulse: 101 (08/24/23 0900)  Resp: 18 (08/24/23 0900)  BP: 108/70 (08/24/23 0734)  SpO2: (!) 94 % (08/24/23 0900) Vital Signs (24h Range):  Temp:  [97.7 °F (36.5 °C)-98.4 °F (36.9 °C)] 97.7 °F (36.5 °C)  Pulse:  [] 101  Resp:  [16-18] 18  SpO2:  [93 %-99 %] 94 %  BP: ()/(61-70) 108/70     Weight: 66.5 kg (146 lb 9.7 oz)  Body mass index is 21.04 kg/m².       Physical Exam  Constitutional:       Appearance: He is ill-appearing.   HENT:      Head: Normocephalic and atraumatic.      Right Ear: External ear normal.      Left Ear: External ear normal.      Nose: Nose normal.      Mouth/Throat:      Mouth: Mucous membranes are dry.   Eyes:      Conjunctiva/sclera: Conjunctivae normal.   Cardiovascular:      Rate and Rhythm: Normal rate.   Pulmonary:      Breath sounds: Wheezing present.   Abdominal:      General: Bowel sounds are normal.      Palpations: Abdomen is soft.   Musculoskeletal:         General: Tenderness present.    Lymphadenopathy:      Cervical: No cervical adenopathy.   Skin:     General: Skin is dry.   Neurological:      Mental Status: He is disoriented.            Review of Symptoms      Symptom Assessment (ESAS 0-10 Scale)  Pain:  0  Dyspnea:  0  Anxiety:  0  Nausea:  0  Depression:  0  Anorexia:  0  Fatigue:  0  Insomnia:  0  Restlessness:  0  Agitation:  0 due to Mental status change         Pain Assessment in Advanced Demential Scale (PAINAD)   Breathing - Independent of vocalization:  1  Negative vocalization:  0  Facial expression:  1  Body language:  1  Consolability:  1  Total:  4    Living Arrangements:  Lives with spouse    Psychosocial/Cultural:   See Palliative Psychosocial Note: No   to wife for 38 years  **Primary  to Follow**  Palliative Care  Consult: No    Spiritual:  F - Noa and Belief:  Amish - raised Jehovah's witness, but attended Denominational [employer] with wife once they got   A - Address in Care:  Engaged spiritual support        Advance Care Planning   Advance Directives:   Living Will: No    LaPOST: No    Do Not Resuscitate Status: Yes    Medical Power of : No      Decision Making:  Family answered questions and Patient unable to communicate due to disease severity/cognitive impairment  Goals of Care: What is most important right now is to focus on comfort and QOL . Accordingly, we have decided that the best plan to meet the patient's goals includes enrolling in hospice care.         Significant Labs: CBC:   Recent Labs   Lab 08/23/23 0459 08/24/23  0440   WBC 13.65* 16.74*   HGB 12.5* 13.5*   HCT 37.7* 42.1    241       CMP:   Recent Labs   Lab 08/23/23 0459 08/24/23  0441   * 136   K 3.6 4.0   CL 97 100   CO2 27 23   GLU 95 94   BUN 10 11   CREATININE 0.6 0.6   CALCIUM 8.5* 8.7   PROT 5.9* 6.2   ALBUMIN 2.2* 2.3*   BILITOT 0.7 0.9   ALKPHOS 116 120   AST 38 51*   ALT 13 15   ANIONGAP 11 13       CBC:   Recent Labs   Lab 08/24/23  0440   WBC  16.74*   HGB 13.5*   HCT 42.1   MCV 89          BMP:  Recent Labs   Lab 08/24/23  0441   GLU 94      K 4.0      CO2 23   BUN 11   CREATININE 0.6   CALCIUM 8.7   MG 1.8       LFT:  Lab Results   Component Value Date    AST 51 (H) 08/24/2023    ALKPHOS 120 08/24/2023    BILITOT 0.9 08/24/2023     Albumin:   Albumin   Date Value Ref Range Status   08/24/2023 2.3 (L) 3.5 - 5.2 g/dL Final     Protein:   Total Protein   Date Value Ref Range Status   08/24/2023 6.2 6.0 - 8.4 g/dL Final     Lactic acid:   Lab Results   Component Value Date    LACTATE 2.2 08/20/2023    LACTATE 3.7 (HH) 03/16/2023       Significant Imaging: I have reviewed all pertinent imaging results/findings within the past 24 hours.

## 2023-08-24 NOTE — ASSESSMENT & PLAN NOTE
Patient Education     Gout Diet  Gout is a painful condition caused by an excess of uric acid, a waste product made by the body. Uric acid forms crystals that collect in the joints. The immune response to these crystals brings on symptoms of joint pain and swelling. This is called a gout attack. Often, medications and diet changes are combined to manage gout. Below are some guidelines for changing your diet to help you manage gout and prevent attacks. Your healthcare provider will help you determine the best eating plan for you.  Eating to manage gout  Weight loss for those who are overweight may help reduce gout attacks.  Eat less of these foods  Eating too many foods containing purines may raise the levels of uric acid in your body. This raises your risk for a gout attack. Try to limit these foods and drinks:    Alcohol, such as beer and red wine. You may be told to avoid alcohol completely.    Soft drinks that contain sugar or high fructose corn syrup    Certain fish, including anchovies, sardines, fish eggs, and herring    Shellfish    Certain meats, such as red meat, hot dogs, luncheon meats, and turkey    Organ meats, such as liver, kidneys, and sweetbreads    Legumes, such as dried beans and peas    Other high fat foods such as gravy, whole milk, and high fat cheeses    Vegetables such as asparagus, cauliflower, spinach, and mushrooms used to be thought to contribute to an increased risk for a gout attack, but recent studies show that high purine vegetables don't increase the risk for a gout attack.  Eat more of these foods  Other foods may be helpful for people with gout. Add some of these foods to your diet:    Cherries contain chemicals that may lower uric acid.    Omega fatty acids. These are found in some fatty fish such as salmon, certain oils (flax, olive, or nut), and nuts themselves. Omega fatty acids may help prevent inflammation due to gout.    Dairy products that are low-fat or fat-free, such as  · Imaging negative for fracture  · Pain control with Palliative     cheese and yogurt    Complex carbohydrate foods, including whole grains, brown rice, oats, and beans    Coffee, in moderation    Water, approximately 64 ounces per day  Follow-up care  Follow up with your healthcare provider as advised.  When to seek medical advice  Call your healthcare provider right away if any of these occur:    Return of gout symptoms, usually at night:    Severe pain, swelling, and heat in a joint, especially the base of the big toe    Affected joint is hard to move    Skin of the affected joint is purple or red    Fever of 100.4 F (38 C) or higher    Pain that doesn't get better even with prescribed medicine   [employer] last reviewed this educational content on 6/1/2018 2000-2021 [employer]. All rights reserved. This information is not intended as a substitute for professional medical care. Always follow your healthcare professional's instructions.

## 2023-08-24 NOTE — NURSING
Resting quietly in bed, eyes closed. Arouses to verbal stimuli, didfficult to understand patient's verbal responses. O2 at 2L via nasal cannula intact. Incontinent brief in use. IV fluids of LR at 75 cc/hr infusing. Continue to monitor.

## 2023-08-24 NOTE — PT/OT/SLP PROGRESS
Speech Language Pathology Treatment    Patient Name:  Kashif Iverson   MRN:  [id number]  Admitting Diagnosis: Encephalopathy, metabolic    Recommendations:                 General Recommendations:  Dysphagia therapy and Cognitive-linguistic evaluation  Diet recommendations:   , Liquid Diet Level: Full liquids   Aspiration Precautions: 1 bite/sip at a time, Alternating bites/sips, Assistance with meals, Feed only when awake/alert, Frequent oral care, HOB to 90 degrees, Meds crushed in puree, Monitor for s/s of aspiration, Small bites/sips, and Strict aspiration precautions   General Precautions: Standard, aspiration, fall  Communication strategies:  go to room if call light pushed    Assessment:     Kashif Iverson is a 62 y.o. male with an SLP diagnosis of Dysphagia and increased risk of aspiration associated with altered mental status.    Subjective     RN cleared patient for PO trials at the bedside.     Patient presents with altered mental status though improved alertness noted this date.     Pain/Comfort:  Pain Rating 1:  (Patient reports pain, however he report the location or pain rating.)  Pain Addressed 1: Nurse notified  Pain Rating Post-Intervention 1:  (Patient reports pain, however he report the location or pain rating.)    Respiratory Status: Nasal cannula, flow 2 L/min    Objective:     Has the patient been evaluated by SLP for swallowing?   Yes  Keep patient NPO? No     Patient seen for ongoing dysphagia therapy. Patient was asleep as SLP entered the room. He rouse with min verbal and tactile cues. Patient re-positioned and HOB raised for safety precautions. Patient consumed straw sips of chocolate boost and cranberry juice via straw and cup sips and small bites of mushroom soup, yogurt, and small pills in yogurt. RN at bedside during PO trials with meds. Large, cyclical sips noted with thin liquids via straw. SLP educated patient on the importance of taking smaller sips to reduce aspiration risk. Despite  education, patient required cues t/o meal. Anterior spillage noted occasionally. Timely initiation of the pharyngeal swallow noted across consistencies. PO trials of soft and regular solids deferred this date 2/2 patient with increased risk of aspiration associated with altered mental status. Education provided re: role of SLP, diet recs, swallow precs, s/s aspiration and POC.  Pt's understanding and carryover is likely decreased 2/2 altered mental status, therefore ongoing education is warranted.       Goals:   Multidisciplinary Problems       SLP Goals          Problem: SLP    Goal Priority Disciplines Outcome   SLP Goal    Low SLP Ongoing, Progressing   Description: Speech Language Pathology Goals  Goals expected to be met by 8/29:  1. Pt will tolerate least restrictive diet without s/s of aspiration.   2. Pt will participate speech/language/cognitive evaluation when appropriate.                                Plan:     Patient to be seen:  4 x/week   Plan of Care expires:  09/21/23  Plan of Care reviewed with:  patient   SLP Follow-Up:  Yes       Discharge recommendations:  other (see comments)   Barriers to Discharge:  Level of Skilled Assistance Needed      Time Tracking:     SLP Treatment Date:   08/24/23  Speech Start Time:  1330  Speech Stop Time:  1348     Speech Total Time (min):  18 min    Billable Minutes: Treatment Swallowing Dysfunction 10 and Self Care/Home Management Training 8    08/24/2023

## 2023-08-24 NOTE — PROGRESS NOTES
[facility] - [facility] ([place] [address])  Palliative Medicine  Progress Note    Patient Name: Kashif Iverson  MRN: [id number]  Admission Date: 8/20/2023  Hospital Length of Stay: 3 days  Code Status: DNR   Attending Provider: Celine Love MD  Consulting Provider: Kacie Huston MD  Primary Care Physician: Eduardo Ruiz MD  Principal Problem:Encephalopathy, metabolic    Patient information was obtained from patient, spouse/SO and primary team.      Assessment/Plan:     Palliative Care  Encounter for palliative care  Kashif Iverson is a 62-year-old man with a history of stage IV NSCLC with metastasis to the bone s/p palliative chemo/radiation, chronic hypoxic respiratory failure 2/2 COPD on supplemental oxygen, HFpEF, atrial fibrillation on apixaban, PAD who was admitted after presenting with an unwitnessed fall and acute LUE paralysis. He was transferred from [facility] to [facility] for evaluation of new LUE weakness in setting of metastatic disease/injury. Palliative and Supportive Care was consulted to explore goals of care.    Advance Care Planning   Goals of Care:  - Code status: DNAR/DNI  - Next of kin: wife Natty Iverson, [phone]  - ACP documents: none  - Patient does not have decision making capacity  - Prognosis: poor  - Family's understanding of prognosis: fair, will immensely benefit from continued counseling about expectations  - Plans: please engage case management to explore possibility of nursing home with hospice support. Finances will play an important role in deciding whether she can afford to get him to nursing home with hospice support vs bringing him home with hospice support. Family benefits from guidance about next best steps in order to honor wishes to be comfortable at this time.    Goals of Care Conversation:  - 8/24/23: Met Mr. Iverson, who answers my questions, but struggles to identify if he's in pain.  reviewed, and prescribed oxycodone 15 mg dose. I called his wife   Zayra who is currently at home, having to pay bills and run errands. She shared that she consulted her sister-in-law, brother-in-law and her own siblings about what to do. She shared that her sister-in-law guilted her, saying that she should've put Mr. Iverson in hospice a long time ago and he could've  peacefully in his sleep. Her brother-in-law was more supportive, saying that if he needs to be in a nursing home, then she should have no guilt about this. She asked me if at the nursing home would they continue his magnesium, potassium, pain medications and I said that as long as a doctor orders it and Mr. Iverson is willing to take it, yes. I did clarify that his vitamins and electrolytes are not going to make him live longer because his cancer is what is taking over his body and ending his life. She asked me if the pain medicines are given just to put people to sleep. I said that is not the case. That we give pain medicine only because there is severe pain to treat. I shared that a lot of people see that people fall asleep or get drowsy after taking pain medicine because that is an expected side effect. But that we can either not give Mr. Iverson pain medicine and he scream in pain for the rest of his life, or we allow him to have medicines that decrease his pain, so that the rest of his life is spent in comfort. She agreed with the latter. At this time, she is still filled with guilt about bringing him to a nursing home vs bringing him home with hospice. Ideally if insurance will cover the cost of the nursing home stay, she'd choose this. I shared that PT recommended SNF, and there is a chance his insurance will cover this. If he does SNF and doesn't do well (which is what we will expect over time as his body gets weaker), then we can make the transition to hospice support at that time, as his insurance will only cover one service or the other, but not both at the same time. She will be here this afternoon to take  "a look at the SNF list, and  will see if his insurance will cover it. Will continue to follow along.  - 8/23/23: I met Mr. Iverson, who is encephalopathic and disoriented. His wife is at bedside. I spoke with her in a separate room and she shares her fears and worries that he is dying. Validated her sadness and fears. She received a call from the attending hospitalist, who shared the unfortunate results of the MRI. She cried and asked, "Is he dying?" He replied, "Yes, he is dying. Not today, and not in the next days." He explained that he spoke with Oncology and confirmed that there are no treatment options available. She asked if there was a role in radiation to shrink his tumor. He shared that while he can get in touch with Rad/Onc to ask, that he worried that even this will not fix his advanced cancer and not change the trajectory. I spoke briefly with Dr. Guevara and agreed to hang up the phone for now to allow Mrs. Iverson time to process the serious information. Allowed time for Mrs. Iverson to cry. She shared that they have been  for 38 years, dated for about 8 years before deciding to get  when they were 25 years old. He had always expressed that he wanted to live, and she said that maybe this is a different scenario. I agreed with her, that in the past, it made a lot of sense to comply with all the treatments and tests that he went through with the doctors in efforts to buy more time, and that now unfortunately we are in a different place. She asked what can we do. I shared that in knowing that time is short (likely short weeks) that we can do our best to make sure every living day he has on earth is spent out of pain and suffering. Discussed using hospice support during this time to ensure that he has the right medical team to help alleviate his suffering. For a long time we talked about home with hospice versus nursing home with hospice. She feels torn because she is not strong enough to " change him in bed, but wonders if she's being cruel by not bringing him home. Normalized that many people do both - bring people home and engage their community to help or utilize nursing home, and that there are no wrong choices. Offered that we can at least explore the cost of nursing home (as this is a questions she had) and get more information for her and her family to make the final decision.  - I did share in my recommendations for comfort, that we protect Mr. Iverson from interventions that would cause more pain/suffering than help. Discussed that interventions like CPR/intubation will not help. I recommended that in efforts to allow Mr. Iverson a natural and peaceful death, that we protect him from these interventions, which means a DNR code status. She agreed. She asked if I would share this with Dr. Guevara as well.  - Agreed to start utilizing medications for pain/anxiety so that he doesn't spend another day suffering. Will continue to follow along to support Mrs. Iverson and Mr. Iverson during this very heartbreaking time.      Neoplasm Related Pain  -  reviewed: was prescribed 30 days of oxycodone 15 mg  - Start MS Contin 15 mg PO BID  - Continue oxycodone IR 10 mg PO q4h PRN severe pain, dyspnea    Dyspnea  - Continue supplemental oxygen for dyspnea relief  - Oxycodone IR 10 mg PO q4h PRN severe pain, dyspnea    Anxiety/Agitation  - Continue home olanzapine ODT 5 mg PO qHS  - Added lorazepam 1 mg PO q4h PRN anxiety        I will follow-up with patient. Please contact us if you have any additional questions.    Subjective:     Chief Complaint:   Chief Complaint   Patient presents with    Fall     Pt arrived via ems, pt chief complaint is a fall. Pt had a fall yesterday and has not been eating or drinking for past few days per ems. Pt was initially hypotensive 80/40 and heart rate ranging 40-60 irregular.         HPI:   Kashif Iverson is a 62-year-old man with a history of stage IV NSCLC with metastasis to the  "bone s/p palliative chemo/radiation, chronic hypoxic respiratory failure 2/2 COPD on supplemental oxygen, HFpEF, atrial fibrillation on apixaban, PAD who was admitted after presenting with an unwitnessed fall and acute LUE paralysis. He was transferred from [facility] to [facility] for evaluation of new LUE weakness in setting of metastatic disease/injury. Palliative and Supportive Care was consulted to explore goals of care.      Hospital Course:  No notes on file    Interval History: Was sleeping soundly this morning. Did have to be woken so he could turn and take breakfast/medications. Eyes closed in my encounter. I asked him if he was in pain and he said, "I'm not sure if it's pain or if it's chills."    Past Medical History:   Diagnosis Date    Chronic obstructive pulmonary disease with acute exacerbation 9/5/2022    Combined systolic and diastolic heart failure     Dependence on supplemental oxygen 5/24/2022    History of completed stroke 9/3/2019     09/03/2019 On CT exam    History of non-ST elevation myocardial infarction (NSTEMI) 2/22/2022    Hypertension     Lung cancer     NSCLC    Lung mass     left lung    Macrocytic anemia 8/24/2019    PAD (peripheral artery disease) 3/14/2022    LUIS FELIPE 3/11/22    Peripheral artery disease        Past Surgical History:   Procedure Laterality Date    BRONCHOSCOPY N/A 08/30/2019    Procedure: Bronchoscopy;  Surgeon: Miguel Diagnostic Provider;  Location: [address];  Service: Anesthesiology;  Laterality: N/A;    CORONARY ANGIOGRAPHY N/A 03/04/2022    Procedure: ANGIOGRAM, CORONARY ARTERY;  Surgeon: Robson Green MD;  Location: [facility] CATH LAB;  Service: Cardiology;  Laterality: N/A;    INSERTION OF TUNNELED CENTRAL VENOUS CATHETER (CVC) WITH SUBCUTANEOUS PORT      MEDIPORT REMOVAL Right 3/21/2023    Procedure: REMOVAL, CATHETER, CENTRAL VENOUS, TUNNELED, WITH PORT;  Surgeon: Eddi Hernandez MD;  Location: [facility] OR;  Service: General;  " Laterality: Right;       Review of patient's allergies indicates:  No Known Allergies    Medications:  Continuous Infusions:   lactated ringers 75 mL/hr at 08/23/23 2027     Scheduled Meds:   albuterol-ipratropium  3 mL Nebulization Q6H WAKE    apixaban  5 mg Oral BID    aspirin  81 mg Oral Daily    atorvastatin  80 mg Oral Daily    cilostazoL  100 mg Oral BID    folic acid  1 mg Oral Daily    metoprolol tartrate  25 mg Oral TID    mupirocin   Nasal BID    OLANZapine zydis  5 mg Oral QHS    polyethylene glycol  17 g Oral Daily    potassium chloride SA  20 mEq Oral Daily    senna-docusate 8.6-50 mg  1 tablet Oral BID    tiotropium bromide  2 puff Inhalation Daily     PRN Meds:acetaminophen, LORazepam, melatonin, naloxone, ondansetron, oxyCODONE, prochlorperazine, sodium chloride 0.9%    Family History       Problem Relation (Age of Onset)    Cancer Father, Sister    Diabetes Mother    Heart defect Mother    No Known Problems Son          Tobacco Use    Smoking status: Former     Current packs/day: 0.00     Average packs/day: 1 pack/day for 25.0 years (25.0 ttl pk-yrs)     Types: Cigarettes     Start date: 1995     Quit date: 2020     Years since quitting: 3.6    Smokeless tobacco: Never   Substance and Sexual Activity    Alcohol use: Yes     Comment: 11/3/22: Occ.    Drug use: Never    Sexual activity: Yes     Partners: Female       Review of Systems   Unable to perform ROS: Mental status change     Objective:     Vital Signs (Most Recent):  Temp: 97.7 °F (36.5 °C) (08/24/23 0734)  Pulse: 101 (08/24/23 0900)  Resp: 18 (08/24/23 0900)  BP: 108/70 (08/24/23 0734)  SpO2: (!) 94 % (08/24/23 0900) Vital Signs (24h Range):  Temp:  [97.7 °F (36.5 °C)-98.4 °F (36.9 °C)] 97.7 °F (36.5 °C)  Pulse:  [] 101  Resp:  [16-18] 18  SpO2:  [93 %-99 %] 94 %  BP: ()/(61-70) 108/70     Weight: 66.5 kg (146 lb 9.7 oz)  Body mass index is 21.04 kg/m².       Physical Exam  Constitutional:       Appearance: He  is ill-appearing.   HENT:      Head: Normocephalic and atraumatic.      Right Ear: External ear normal.      Left Ear: External ear normal.      Nose: Nose normal.      Mouth/Throat:      Mouth: Mucous membranes are dry.   Eyes:      Conjunctiva/sclera: Conjunctivae normal.   Cardiovascular:      Rate and Rhythm: Normal rate.   Pulmonary:      Breath sounds: Wheezing present.   Abdominal:      General: Bowel sounds are normal.      Palpations: Abdomen is soft.   Musculoskeletal:         General: Tenderness present.   Lymphadenopathy:      Cervical: No cervical adenopathy.   Skin:     General: Skin is dry.   Neurological:      Mental Status: He is disoriented.            Review of Symptoms      Symptom Assessment (ESAS 0-10 Scale)  Pain:  0  Dyspnea:  0  Anxiety:  0  Nausea:  0  Depression:  0  Anorexia:  0  Fatigue:  0  Insomnia:  0  Restlessness:  0  Agitation:  0 due to Mental status change         Pain Assessment in Advanced Demential Scale (PAINAD)   Breathing - Independent of vocalization:  1  Negative vocalization:  0  Facial expression:  1  Body language:  1  Consolability:  1  Total:  4    Living Arrangements:  Lives with spouse    Psychosocial/Cultural:   See Palliative Psychosocial Note: No   to wife for 38 years  **Primary  to Follow**  Palliative Care  Consult: No    Spiritual:  F - Noa and Belief:  Restorationism - raised [employer], but attended [employer] with wife once they got   A - Address in Care:  Engaged spiritual support        Advance Care Planning  Advance Directives:   Living Will: No    LaPOST: No    Do Not Resuscitate Status: Yes    Medical Power of : No      Decision Making:  Family answered questions and Patient unable to communicate due to disease severity/cognitive impairment  Goals of Care: What is most important right now is to focus on comfort and QOL . Accordingly, we have decided that the best plan to meet the patient's goals includes  enrolling in hospice care.         Significant Labs: CBC:   Recent Labs   Lab 08/23/23  0459 08/24/23  0440   WBC 13.65* 16.74*   HGB 12.5* 13.5*   HCT 37.7* 42.1    241       CMP:   Recent Labs   Lab 08/23/23  0459 08/24/23 0441   * 136   K 3.6 4.0   CL 97 100   CO2 27 23   GLU 95 94   BUN 10 11   CREATININE 0.6 0.6   CALCIUM 8.5* 8.7   PROT 5.9* 6.2   ALBUMIN 2.2* 2.3*   BILITOT 0.7 0.9   ALKPHOS 116 120   AST 38 51*   ALT 13 15   ANIONGAP 11 13       CBC:   Recent Labs   Lab 08/24/23 0440   WBC 16.74*   HGB 13.5*   HCT 42.1   MCV 89          BMP:  Recent Labs   Lab 08/24/23 0441   GLU 94      K 4.0      CO2 23   BUN 11   CREATININE 0.6   CALCIUM 8.7   MG 1.8       LFT:  Lab Results   Component Value Date    AST 51 (H) 08/24/2023    ALKPHOS 120 08/24/2023    BILITOT 0.9 08/24/2023     Albumin:   Albumin   Date Value Ref Range Status   08/24/2023 2.3 (L) 3.5 - 5.2 g/dL Final     Protein:   Total Protein   Date Value Ref Range Status   08/24/2023 6.2 6.0 - 8.4 g/dL Final     Lactic acid:   Lab Results   Component Value Date    LACTATE 2.2 08/20/2023    LACTATE 3.7 (HH) 03/16/2023       Significant Imaging: I have reviewed all pertinent imaging results/findings within the past 24 hours.    I spent a total of 95 minutes on the day of the visit. This includes face to face time in discussion of goals of care, symptom assessment, coordination of care and emotional support. This also includes non-face to face time preparing to see the patient (eg, review of tests/imaging), obtaining and/or reviewing separately obtained history, documenting clinical information in the electronic or other health record, independently interpreting results and communicating results to the patient/family/caregiver, or care coordinator.       Kacie Huston MD  Palliative Medicine  [facility] - Intensive Care ([place] [address])

## 2023-08-24 NOTE — PLAN OF CARE
Problem: Coping Ineffective  Goal: Effective Coping  Outcome: Ongoing, Progressing     Problem: Adult Inpatient Plan of Care  Goal: Plan of Care Review  Outcome: Ongoing, Progressing  Goal: Patient-Specific Goal (Individualized)  Outcome: Ongoing, Progressing  Goal: Absence of Hospital-Acquired Illness or Injury  Outcome: Ongoing, Progressing  Goal: Optimal Comfort and Wellbeing  Outcome: Ongoing, Progressing  Goal: Readiness for Transition of Care  Outcome: Ongoing, Progressing     Problem: Fall Injury Risk  Goal: Absence of Fall and Fall-Related Injury  Outcome: Ongoing, Progressing     Problem: Skin Injury Risk Increased  Goal: Skin Health and Integrity  Outcome: Ongoing, Progressing     Problem: Impaired Wound Healing  Goal: Optimal Wound Healing  Outcome: Ongoing, Progressing     Problem: Infection  Goal: Absence of Infection Signs and Symptoms  Outcome: Ongoing, Progressing

## 2023-08-24 NOTE — NURSING
Speech therapy in to see patient. Patient soiled and urinated incontinently. Patient cleaned, linen and pads changed. Patient agitated and mildly combative during cleaning and linen change, but quickly relaxes once activity completed. Replaced O2 that patient had removed. Tolerated well.

## 2023-08-24 NOTE — HOSPITAL COURSE
Mr. Iverson was admitted to Hospital Medicine for management of a fall with leg/back pain as well as flaccid left arm.  His mentation worsened.  MRI brachial plexus showing large mass within the left supraclavicular region with extesive metastasis. MRI brain and C/T/L spine were negative for mets, but was a poor study 2/2 motion.  EEG was consistent with a moderate to severe diffuse encephalopathy, but no seizures.  Chart review shows patient not candidate for further treatment. Case discussed with [facility] here who are in agreement. He was found to have an occlusive thrombus in the left internal jugular and subclavian veins.  He was started on treatment dose Eliquis.  Palliative care consulted. Family requesting DNR and home vs NH with hospice.  Family struggled with NH with hospice acceptance, so the process was delayed.  On the evening of 8/29, he was noted to be tachycardic, agitated, and more hypoxic.  He was started on a Morphine gtt with plans to transition to comfort care and the [facility]/inpatient hospice on 8/30 after speaking with Palliative Care and the wife.  However, he passed at 5am on 8/30.

## 2023-08-24 NOTE — CONSULTS
Patient seen for wound care consultation.   Reviewed chart for this encounter.   See Flow Sheet for findings.    Pt laying in bed, agreed to assessment, diaper removed and pt was able to turn self for assessment revealing pink area and dark discoloration to sacral/coccyx area, non-blanchable.   No open wound noted.    RECOMMENDATIONS:  Keep pt clean and dry, no diapers/no foam.  Apply waffle / sacral foam border as preventative.     Discussed POC with patient and primary nurse.   See EMR for orders & patient education.    Discussed nutrition and the role of protein in wound healing with the patient. Instructed patient to optimize protein for wound healing.    Nursing to continue care.  Nursing to maintain pressure injury prevention interventions.    WC sign off--no open wounds     Please re-consult if needed.    08/24/23 0900   WOCN Assessment   WOCN Total Time (mins) 30   Visit Date 08/24/23   Visit Time 0900   Consult Type New   WOCN Speciality Wound   Teaching on-going        Altered Skin Integrity 08/21/23 2203  Coccyx   Date First Assessed/Time First Assessed: 08/21/23 2203   Altered Skin Integrity Present on Admission - Did Patient arrive to the hospital with altered skin?: yes  Side: (c)   Location: Coccyx  Is this injury device related?: No   Wound Image    Description of Altered Skin Integrity Intact skin with non-blanchable redness of localized area   Dressing Appearance Open to air   Drainage Amount None   Drainage Characteristics/Odor No odor   Appearance Dry;Other (see comments)  (dark discolored, non-blanchable)   Tissue loss description Not applicable   Off Loading Other (see comments)  (Waffle)

## 2023-08-24 NOTE — SUBJECTIVE & OBJECTIVE
Interval History: No acute events overnight.  Palliative Care consulted. DNR noted.  Working on NH with hospice, per wife request.    Review of Systems   Unable to perform ROS: Mental status change     Objective:     Vital Signs (Most Recent):  Temp: 97.7 °F (36.5 °C) (08/24/23 0734)  Pulse: 101 (08/24/23 0900)  Resp: 18 (08/24/23 0900)  BP: 108/70 (08/24/23 0734)  SpO2: (!) 94 % (08/24/23 0900) Vital Signs (24h Range):  Temp:  [97.7 °F (36.5 °C)-98.4 °F (36.9 °C)] 97.7 °F (36.5 °C)  Pulse:  [] 101  Resp:  [16-18] 18  SpO2:  [93 %-99 %] 94 %  BP: ()/(61-70) 108/70     Weight: 66.5 kg (146 lb 9.7 oz)  Body mass index is 21.04 kg/m².    Intake/Output Summary (Last 24 hours) at 8/24/2023 1111  Last data filed at 8/24/2023 0745  Gross per 24 hour   Intake 120 ml   Output --   Net 120 ml         Physical Exam  Constitutional:       Appearance: He is well-developed. He is ill-appearing.   HENT:      Head: Normocephalic and atraumatic.   Cardiovascular:      Rate and Rhythm: Normal rate and regular rhythm.      Heart sounds: No murmur heard.  Pulmonary:      Effort: Pulmonary effort is normal. No respiratory distress.      Breath sounds: Normal breath sounds. No wheezing or rales.   Abdominal:      General: There is no distension.      Palpations: Abdomen is soft.      Tenderness: There is no abdominal tenderness.   Musculoskeletal:         General: No deformity.   Skin:     General: Skin is warm.   Neurological:      Mental Status: He is alert. He is disoriented.             Significant Labs: All pertinent labs within the past 24 hours have been reviewed.  CBC:   Recent Labs   Lab 08/23/23 0459 08/24/23  0440   WBC 13.65* 16.74*   HGB 12.5* 13.5*   HCT 37.7* 42.1    241     CMP:   Recent Labs   Lab 08/23/23 0459 08/24/23  0441   * 136   K 3.6 4.0   CL 97 100   CO2 27 23   GLU 95 94   BUN 10 11   CREATININE 0.6 0.6   CALCIUM 8.5* 8.7   PROT 5.9* 6.2   ALBUMIN 2.2* 2.3*   BILITOT 0.7 0.9   ALKPHOS  116 120   AST 38 51*   ALT 13 15   ANIONGAP 11 13       Significant Imaging: I have reviewed all pertinent imaging results/findings within the past 24 hours.

## 2023-08-25 NOTE — PLAN OF CARE
Pt disoriented x4 follows commands intermittently. Incontinent. O2 titrated up from 2LNC to 4LNC. Tachy on tele. Garbled speech. Bed in lowest position and call light within reach. No acute events overnight.

## 2023-08-25 NOTE — PT/OT/SLP PROGRESS
Physical Therapy    Patient Name:  Kashif Iverson   MRN:  [id number]  Admitting Diagnosis:  Encephalopathy, metabolic   Recent Surgery: * No surgery found *    Admit Date: 8/20/2023  Length of Stay: 4 days    Per medical team, patient is transitioning to hospice care. Orders will be discontinued.    Mary Alvarez, PT, DPT  8/25/2023

## 2023-08-25 NOTE — ASSESSMENT & PLAN NOTE
Advance Care Planning     Date: 08/23/2023    Code Status  In light of the patients advanced and life limiting illness,I engaged the the family in a voluntary conversation about the patient's preferences for care at the very end of life. The patient wishes to continue with life prolonging measures at this time.  Along those lines, the patient does wish to have CPR or other invasive treatments performed when his heart and/or breathing stops. I communicated to the family that patient will remain FULL code at this time.  I spent a total of 15 minutes engaging the patient in this advance care planning discussion.       [facility]  I engaged the family in a voluntary conversation about advance care planning and we specifically addressed what the goals of care would be moving forward, in light of the patient's change in clinical status, specifically worsening metastatic lung cancer.  We did specifically address the patient's likely prognosis, which is poor.  We explored the patient's values and preferences for future care.  The family endorses that what is most important right now is to focus on curative/life-prolongation (regardless of treatment burdens)    Accordingly, we have decided that the best plan to meet the patient's goals includes continuing with treatment    I did not explain the role for hospice care at this stage of the patient's illness, including its ability to help the patient live with the best quality of life possible.  We will not be making a hospice referral.    I spent a total of 15 minutes engaging the patient in this advance care planning discussion.    Performed by Dr. Parmjit Guevara

## 2023-08-25 NOTE — ASSESSMENT & PLAN NOTE
Kashif Iverson is a 62-year-old man with a history of stage IV NSCLC with metastasis to the bone s/p palliative chemo/radiation, chronic hypoxic respiratory failure 2/2 COPD on supplemental oxygen, HFpEF, atrial fibrillation on apixaban, PAD who was admitted after presenting with an unwitnessed fall and acute LUE paralysis. He was transferred from [facility] to [facility] for evaluation of new LUE weakness in setting of metastatic disease/injury. Palliative and Supportive Care was consulted to explore goals of care.    Advance Care Planning   Goals of Care:  - Code status: DNAR/DNI  - Next of kin: wife Natty Iverson, [phone]  - ACP documents: none  - Patient does not have decision making capacity  - Prognosis: poor  - Family's understanding of prognosis: fair, will immensely benefit from continued counseling about expectations  - Plans: please engage case management to explore possibility of nursing home with hospice support. Finances will play an important role in deciding whether she can afford to get him to nursing home with hospice support vs bringing him home with hospice support. Family benefits from guidance about next best steps in order to honor wishes to be comfortable at this time.    Goals of Care Conversation:  - 23: Met Mr. Iverson, who answers my questions, but struggles to identify if he's in pain.  reviewed, and prescribed oxycodone 15 mg dose. I called his wife Mrs. Iverson who is currently at home, having to pay bills and run errands. She shared that she consulted her sister-in-law, brother-in-law and her own siblings about what to do. She shared that her sister-in-law guilted her, saying that she should've put Mr. Iverson in hospice a long time ago and he could've  peacefully in his sleep. Her brother-in-law was more supportive, saying that if he needs to be in a nursing home, then she should have no guilt about this. She asked me if at the nursing home would they continue his  "magnesium, potassium, pain medications and I said that as long as a doctor orders it and Mr. Iverson is willing to take it, yes. I did clarify that his vitamins and electrolytes are not going to make him live longer because his cancer is what is taking over his body and ending his life. She asked me if the pain medicines are given just to put people to sleep. I said that is not the case. That we give pain medicine only because there is severe pain to treat. I shared that a lot of people see that people fall asleep or get drowsy after taking pain medicine because that is an expected side effect. But that we can either not give Mr. Iverson pain medicine and he scream in pain for the rest of his life, or we allow him to have medicines that decrease his pain, so that the rest of his life is spent in comfort. She agreed with the latter. At this time, she is still filled with guilt about bringing him to a nursing home vs bringing him home with hospice. Ideally if insurance will cover the cost of the nursing home stay, she'd choose this. I shared that PT recommended SNF, and there is a chance his insurance will cover this. If he does SNF and doesn't do well (which is what we will expect over time as his body gets weaker), then we can make the transition to hospice support at that time, as his insurance will only cover one service or the other, but not both at the same time. She will be here this afternoon to take a look at the SNF list, and  will see if his insurance will cover it. Will continue to follow along.  - 8/23/23: I met Mr. Iverson, who is encephalopathic and disoriented. His wife is at bedside. I spoke with her in a separate room and she shares her fears and worries that he is dying. Validated her sadness and fears. She received a call from the attending hospitalist, who shared the unfortunate results of the MRI. She cried and asked, "Is he dying?" He replied, "Yes, he is dying. Not today, and not in the " "next days." He explained that he spoke with Oncology and confirmed that there are no treatment options available. She asked if there was a role in radiation to shrink his tumor. He shared that while he can get in touch with Rad/Onc to ask, that he worried that even this will not fix his advanced cancer and not change the trajectory. I spoke briefly with Dr. Guevara and agreed to hang up the phone for now to allow Mrs. Iverson time to process the serious information. Allowed time for Mrs. Iverson to cry. She shared that they have been  for 38 years, dated for about 8 years before deciding to get  when they were 25 years old. He had always expressed that he wanted to live, and she said that maybe this is a different scenario. I agreed with her, that in the past, it made a lot of sense to comply with all the treatments and tests that he went through with the doctors in efforts to buy more time, and that now unfortunately we are in a different place. She asked what can we do. I shared that in knowing that time is short (likely short weeks) that we can do our best to make sure every living day he has on earth is spent out of pain and suffering. Discussed using hospice support during this time to ensure that he has the right medical team to help alleviate his suffering. For a long time we talked about home with hospice versus nursing home with hospice. She feels torn because she is not strong enough to change him in bed, but wonders if she's being cruel by not bringing him home. Normalized that many people do both - bring people home and engage their community to help or utilize nursing home, and that there are no wrong choices. Offered that we can at least explore the cost of nursing home (as this is a questions she had) and get more information for her and her family to make the final decision.  - I did share in my recommendations for comfort, that we protect Mr. Iverson from interventions that would cause more " pain/suffering than help. Discussed that interventions like CPR/intubation will not help. I recommended that in efforts to allow Mr. Iverson a natural and peaceful death, that we protect him from these interventions, which means a DNR code status. She agreed. She asked if I would share this with Dr. Guevara as well.  - Agreed to start utilizing medications for pain/anxiety so that he doesn't spend another day suffering. Will continue to follow along to support Mrs. Iverson and Mr. Iverson during this very heartbreaking time.       Neoplasm Related Pain  -  reviewed: was prescribed 30 days of oxycodone 15 mg  - Continue MS Contin 15 mg PO BID (newly started during this admission)  - Continue oxycodone IR 10 mg PO q4h PRN severe pain, dyspnea  - In setting of hyperactive and hypoactive delirium, will need to assess pain behaviors including guarding, grimace, vocalization. He has clear reasons for significant pain due to painful bony metastasis and left arm swelling. Appreciate thoughtful and observant bedside RN who is doing this as well    Dyspnea  - Continue supplemental oxygen for dyspnea relief  - Opioids as above for dyspnea relief    Anxiety/Agitation  - Continue home olanzapine ODT 5 mg PO qHS  - Added lorazepam 1 mg PO q4h PRN anxiety

## 2023-08-25 NOTE — CARE UPDATE
RAPID RESPONSE NURSE ROUND       Rounding completed with charge RNSanta for tachycardia reports HR 130s. No additional concerns verbalized at this time. Instructed to call [phone] for further concerns or assistance.

## 2023-08-25 NOTE — PROGRESS NOTES
[facility] ([place] [id number])  [facility]  Progress Note    Patient Name: Kashif Iverson  MRN: [id number]  Patient Class: IP- Inpatient   Admission Date: 8/20/2023  Length of Stay: 4 days  Attending Physician: Celine Love MD  Primary Care Provider: Eduardo Ruiz MD        Subjective:     Principal Problem:Encephalopathy, metabolic        HPI:  Kashif Iverson is a 62 y.o. male, with a PMHx of PAD, diastolic CHF, COPD, non-small cell lung cancer who presents to the ED s/p fall on yesterday. Pt family reports pt slipped and fell in bathroom yesterday and afterwards was walking fine. EMS reports hypotensive bp 80/40 and heart rate 40-60 irregular. Pt given 500ml of NS per EMS. Pt placed on 2L of O2. On Today pt can't stand on his own, unable to ambulate. Pt reports leg and back pain. Leg pain is exacerbated with movement. Pt reports noticing flaccid left arm on yesterday. Patient denies lightheadedness, dizziness, or other associated symptoms. This is the extent of the patient's complaints today in the Emergency Department.CTA head,neck show,Right basal ganglia lacunar infarct.  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  Associated attenuation of the left proximal subclavian and left common carotid artery.  Pulmonary masses.   patient has severe  hypokalemia 2.4,being replaced po and IV,patient seems confused.transfer center trying [place] [facility] [place]pending transfer,      Overview/Hospital Course:  Mr. Iverson was admitted to Hospital Medicine for management of a fall with leg/back pain as well as flaccid left arm.  His mentation worsened.  MRI brachial plexus showing large mass within the left supraclavicular region with extesive metastasis. MRI brain and C/T/L spine were negative for mets, but was a poor study 2/2  motion.  EEG was consistent with a moderate to severe diffuse encephalopathy, but no seizures.  Chart review shows patient not candidate for further treatment. Case discussed with [facility] here who are in agreement. Palliative care consulted. Family requesting DNR and home vs NH with hospice.      Interval History: No acute events overnight.  Working on hospice placement but financials are an issue.  Increased Metoprolol for afib with RVR.    Review of Systems   Unable to perform ROS: Mental status change     Objective:     Vital Signs (Most Recent):  Temp: 99 °F (37.2 °C) (08/25/23 0925)  Pulse: (!) 112 (08/25/23 0940)  Resp: 16 (08/25/23 1048)  BP: 100/67 (08/25/23 0940)  SpO2: (!) 94 % (08/25/23 0827) Vital Signs (24h Range):  Temp:  [97.9 °F (36.6 °C)-99 °F (37.2 °C)] 99 °F (37.2 °C)  Pulse:  [] 112  Resp:  [16-20] 16  SpO2:  [93 %-97 %] 94 %  BP: ()/(58-69) 100/67     Weight: 66.5 kg (146 lb 9.7 oz)  Body mass index is 21.04 kg/m².    Intake/Output Summary (Last 24 hours) at 8/25/2023 1053  Last data filed at 8/25/2023 0745  Gross per 24 hour   Intake 120 ml   Output --   Net 120 ml           Physical Exam  Constitutional:       Appearance: He is well-developed. He is ill-appearing.   HENT:      Head: Normocephalic and atraumatic.   Cardiovascular:      Rate and Rhythm: Normal rate and regular rhythm.      Heart sounds: No murmur heard.  Pulmonary:      Effort: Pulmonary effort is normal. No respiratory distress.      Breath sounds: Normal breath sounds. No wheezing or rales.   Abdominal:      General: There is no distension.      Palpations: Abdomen is soft.      Tenderness: There is no abdominal tenderness.   Musculoskeletal:         General: No deformity.   Skin:     General: Skin is warm.   Neurological:      Mental Status: He is alert. He is disoriented.             Significant Labs: All pertinent labs within the past 24 hours have been reviewed.  CBC:   Recent Labs   Lab 08/24/23  0440   WBC  16.74*   HGB 13.5*   HCT 42.1          CMP:   Recent Labs   Lab 08/24/23  0441      K 4.0      CO2 23   GLU 94   BUN 11   CREATININE 0.6   CALCIUM 8.7   PROT 6.2   ALBUMIN 2.3*   BILITOT 0.9   ALKPHOS 120   AST 51*   ALT 15   ANIONGAP 13         Significant Imaging: I have reviewed all pertinent imaging results/findings within the past 24 hours.      Assessment/Plan:      * Encephalopathy, metabolic  · Metastatic SCC of lung with extensive mets s/p palliative chemoradiation  · CTA head with no acute abnormality. No large vessel occlusion. Right basal ganglia lacunar infarct.  Right frontal and right parietal encephalomalacia changes again seen.   · Neurology consulted  · Delirium precautions  · Ammonia, B12, TSH WNL. Folate deficient, will supplement  · MRI brachial plexus showing large mass with extensive invasion/mets  · MRI brain and C/T/L spine negative for mets but poor study given motion  · EEG negative for seizures but with moderate/severe encephalopathy    Squamous cell carcinoma of lung  · See above    Secondary malignant neoplasm of bone  · See above      Lung cancer, main bronchus, left  · Known SCC of the left lung with mets  · Per Onc, no further treatment options  · Palliative Care consulted      Fall  · Imaging negative for fracture  · Pain control with Palliative      ACP (advance care planning)  Advance Care Planning     Date: 08/23/2023    Code Status  In light of the patients advanced and life limiting illness,I engaged the the family in a voluntary conversation about the patient's preferences for care at the very end of life. The patient wishes to continue with life prolonging measures at this time.  Along those lines, the patient does wish to have CPR or other invasive treatments performed when his heart and/or breathing stops. I communicated to the family that patient will remain FULL code at this time.  I spent a total of 15 minutes engaging the patient in this advance care  planning discussion.       [facility]  I engaged the family in a voluntary conversation about advance care planning and we specifically addressed what the goals of care would be moving forward, in light of the patient's change in clinical status, specifically worsening metastatic lung cancer.  We did specifically address the patient's likely prognosis, which is poor.  We explored the patient's values and preferences for future care.  The family endorses that what is most important right now is to focus on curative/life-prolongation (regardless of treatment burdens)    Accordingly, we have decided that the best plan to meet the patient's goals includes continuing with treatment    I did not explain the role for hospice care at this stage of the patient's illness, including its ability to help the patient live with the best quality of life possible.  We will not be making a hospice referral.    I spent a total of 15 minutes engaging the patient in this advance care planning discussion.    Performed by Dr. Parnell Hand             Encounter for palliative care  · Palliative following      Left arm weakness  · MRI brachial plexus showing large mass within the left supraclavicular region with associated mediastinal, left upper lobe and left paravertebral extension and associated invasion/destruction of the left clavicle, left 1st rib and possibly the adjacent C6 through T2 vertebral bodies.        Persistent atrial fibrillation  · Chronic issue but now with afib with RVR  · Increase Metoprolol dose    COPD (chronic obstructive pulmonary disease)  · Chronic and stable  · On 2L NC  · Duoneb PRN        Coronary artery disease involving native coronary artery of native heart without angina pectoris  · Chronic and stable  · Cont aspirin, statin      PAD (peripheral artery disease)  · Chronic and stable  · Continue Aspirin and Statin      Chronic diastolic heart failure  · No signs of decompensated HF  · Cont aspirin,  metoprolol      Hypokalemia  · Resolved      Essential hypertension  · Chronic and stable  · Cont metoprolol      Bronchiectasis without complication  · Noted      Atherosclerosis of aorta  On medical treatments.        VTE Risk Mitigation (From admission, onward)         Ordered     apixaban tablet 5 mg  2 times daily         08/21/23 1220                Discharge Planning   AZ: 8/29/2023     Code Status: DNR   Is the patient medically ready for discharge?: Yes    Reason for patient still in hospital (select all that apply): Pending disposition  Discharge Plan A: New Nursing Home placement - correction care facility (hospice consult)   Discharge Delays: None known at this time              Celine Love MD  Department of Hospital Medicine   [facility] - Intensive Care ([place])

## 2023-08-25 NOTE — PROGRESS NOTES
[facility] ([place] [address])  Palliative Medicine  Progress Note    Patient Name: Kashif Iverson  MRN: [id number]  Admission Date: 8/20/2023  Hospital Length of Stay: 4 days  Code Status: DNR   Attending Provider: Celine Love MD  Consulting Provider: Kacie Huston MD  Primary Care Physician: Eduardo Ruiz MD  Principal Problem:Encephalopathy, metabolic    Patient information was obtained from patient and primary team.      Assessment/Plan:     Palliative Care  Encounter for palliative care  Kashif Iverson is a 62-year-old man with a history of stage IV NSCLC with metastasis to the bone s/p palliative chemo/radiation, chronic hypoxic respiratory failure 2/2 COPD on supplemental oxygen, HFpEF, atrial fibrillation on apixaban, PAD who was admitted after presenting with an unwitnessed fall and acute LUE paralysis. He was transferred from [facility] to [facility] for evaluation of new LUE weakness in setting of metastatic disease/injury. Palliative and Supportive Care was consulted to explore goals of care.    Advance Care Planning   Goals of Care:  - Code status: DNAR/DNI  - Next of kin: wife Natty Iverson, [phone]  - ACP documents: none  - Patient does not have decision making capacity  - Prognosis: poor  - Family's understanding of prognosis: fair, will immensely benefit from continued counseling about expectations  - Plans: please engage case management to explore possibility of nursing home with hospice support. Finances will play an important role in deciding whether she can afford to get him to nursing home with hospice support vs bringing him home with hospice support. Family benefits from guidance about next best steps in order to honor wishes to be comfortable at this time.    Goals of Care Conversation:  - 8/24/23: Met Mr. Iverson, who answers my questions, but struggles to identify if he's in pain.  reviewed, and prescribed oxycodone 15 mg dose. I called his wife Mrs. Iverson who is  currently at home, having to pay bills and run errands. She shared that she consulted her sister-in-law, brother-in-law and her own siblings about what to do. She shared that her sister-in-law guilted her, saying that she should've put Mr. Iverson in hospice a long time ago and he could've  peacefully in his sleep. Her brother-in-law was more supportive, saying that if he needs to be in a nursing home, then she should have no guilt about this. She asked me if at the nursing home would they continue his magnesium, potassium, pain medications and I said that as long as a doctor orders it and Mr. Iverson is willing to take it, yes. I did clarify that his vitamins and electrolytes are not going to make him live longer because his cancer is what is taking over his body and ending his life. She asked me if the pain medicines are given just to put people to sleep. I said that is not the case. That we give pain medicine only because there is severe pain to treat. I shared that a lot of people see that people fall asleep or get drowsy after taking pain medicine because that is an expected side effect. But that we can either not give Mr. Iverson pain medicine and he scream in pain for the rest of his life, or we allow him to have medicines that decrease his pain, so that the rest of his life is spent in comfort. She agreed with the latter. At this time, she is still filled with guilt about bringing him to a nursing home vs bringing him home with hospice. Ideally if insurance will cover the cost of the nursing home stay, she'd choose this. I shared that PT recommended SNF, and there is a chance his insurance will cover this. If he does SNF and doesn't do well (which is what we will expect over time as his body gets weaker), then we can make the transition to hospice support at that time, as his insurance will only cover one service or the other, but not both at the same time. She will be here this afternoon to take a look at the  "SNF list, and  will see if his insurance will cover it. Will continue to follow along.  - 8/23/23: I met Mr. Iverson, who is encephalopathic and disoriented. His wife is at bedside. I spoke with her in a separate room and she shares her fears and worries that he is dying. Validated her sadness and fears. She received a call from the attending hospitalist, who shared the unfortunate results of the MRI. She cried and asked, "Is he dying?" He replied, "Yes, he is dying. Not today, and not in the next days." He explained that he spoke with Oncology and confirmed that there are no treatment options available. She asked if there was a role in radiation to shrink his tumor. He shared that while he can get in touch with Rad/Onc to ask, that he worried that even this will not fix his advanced cancer and not change the trajectory. I spoke briefly with Dr. Guevara and agreed to hang up the phone for now to allow Mrs. Iverson time to process the serious information. Allowed time for Mrs. Iverson to cry. She shared that they have been  for 38 years, dated for about 8 years before deciding to get  when they were 25 years old. He had always expressed that he wanted to live, and she said that maybe this is a different scenario. I agreed with her, that in the past, it made a lot of sense to comply with all the treatments and tests that he went through with the doctors in efforts to buy more time, and that now unfortunately we are in a different place. She asked what can we do. I shared that in knowing that time is short (likely short weeks) that we can do our best to make sure every living day he has on earth is spent out of pain and suffering. Discussed using hospice support during this time to ensure that he has the right medical team to help alleviate his suffering. For a long time we talked about home with hospice versus nursing home with hospice. She feels torn because she is not strong enough to change him in " bed, but wonders if she's being cruel by not bringing him home. Normalized that many people do both - bring people home and engage their community to help or utilize nursing home, and that there are no wrong choices. Offered that we can at least explore the cost of nursing home (as this is a questions she had) and get more information for her and her family to make the final decision.  - I did share in my recommendations for comfort, that we protect Mr. Iverson from interventions that would cause more pain/suffering than help. Discussed that interventions like CPR/intubation will not help. I recommended that in efforts to allow Mr. Iverson a natural and peaceful death, that we protect him from these interventions, which means a DNR code status. She agreed. She asked if I would share this with Dr. Guevara as well.  - Agreed to start utilizing medications for pain/anxiety so that he doesn't spend another day suffering. Will continue to follow along to support Mrs. Iverson and Mr. Iverson during this very heartbreaking time.      Neoplasm Related Pain  -  reviewed: was prescribed 30 days of oxycodone 15 mg  - Continue MS Contin 15 mg PO BID (newly started during this admission)  - Continue oxycodone IR 10 mg PO q4h PRN severe pain, dyspnea  - In setting of hyperactive and hypoactive delirium, will need to assess pain behaviors including guarding, grimace, vocalization. He has clear reasons for significant pain due to painful bony metastasis and left arm swelling. Appreciate thoughtful and observant bedside RN who is doing this as well    Dyspnea  - Continue supplemental oxygen for dyspnea relief  - Opioids as above for dyspnea relief    Anxiety/Agitation  - Continue home olanzapine ODT 5 mg PO qHS  - Added lorazepam 1 mg PO q4h PRN anxiety        Left voicemail to Mrs. Iverson. I will call again later in the day to touch base with her. Current disposition plans are seeking nursing home placement, but financial costs will likely  "play a factor in ultimate disposition. Will not be here over the weekend and follow up again on Monday.    Subjective:     Chief Complaint:   Chief Complaint   Patient presents with    Fall     Pt arrived via ems, pt chief complaint is a fall. Pt had a fall yesterday and has not been eating or drinking for past few days per ems. Pt was initially hypotensive 80/40 and heart rate ranging 40-60 irregular.         HPI:   Kashif Iverson is a 62-year-old man with a history of stage IV NSCLC with metastasis to the bone s/p palliative chemo/radiation, chronic hypoxic respiratory failure 2/2 COPD on supplemental oxygen, HFpEF, atrial fibrillation on apixaban, PAD who was admitted after presenting with an unwitnessed fall and acute LUE paralysis. He was transferred from [facility] to [facility] for evaluation of new LUE weakness in setting of metastatic disease/injury. Palliative and Supportive Care was consulted to explore goals of care.      Hospital Course:  No notes on file    Interval History: Awake, but speech difficult to understand, picking at things with his hands then guards his left arm when I touch it. I asked him if he was in pain and he said, "No." I assessed his left swollen arm and he guarded it and tried to push my hand away.    Past Medical History:   Diagnosis Date    Chronic obstructive pulmonary disease with acute exacerbation 9/5/2022    Combined systolic and diastolic heart failure     Dependence on supplemental oxygen 5/24/2022    History of completed stroke 9/3/2019     09/03/2019 On CT exam    History of non-ST elevation myocardial infarction (NSTEMI) 2/22/2022    Hypertension     Lung cancer     NSCLC    Lung mass     left lung    Macrocytic anemia 8/24/2019    PAD (peripheral artery disease) 3/14/2022    LUIS FELIPE 3/11/22    Peripheral artery disease        Past Surgical History:   Procedure Laterality Date    BRONCHOSCOPY N/A 08/30/2019    Procedure: Bronchoscopy;  Surgeon: Miguel " Diagnostic Provider;  Location: [facility] OR [facility] FLR;  Service: Anesthesiology;  Laterality: N/A;    CORONARY ANGIOGRAPHY N/A 03/04/2022    Procedure: ANGIOGRAM, CORONARY ARTERY;  Surgeon: Robson Green MD;  Location: [facility] CATH LAB;  Service: Cardiology;  Laterality: N/A;    INSERTION OF TUNNELED CENTRAL VENOUS CATHETER (CVC) WITH SUBCUTANEOUS PORT      MEDIPORT REMOVAL Right 3/21/2023    Procedure: REMOVAL, CATHETER, CENTRAL VENOUS, TUNNELED, WITH PORT;  Surgeon: Eddi Hernandez MD;  Location: [facility] OR;  Service: General;  Laterality: Right;       Review of patient's allergies indicates:  No Known Allergies    Medications:  Continuous Infusions:      Scheduled Meds:   albuterol-ipratropium  3 mL Nebulization Q6H WAKE    apixaban  5 mg Oral BID    aspirin  81 mg Oral Daily    atorvastatin  80 mg Oral Daily    cilostazoL  100 mg Oral BID    folic acid  1 mg Oral Daily    metoprolol tartrate  50 mg Oral TID    morphine  15 mg Oral Q12H    mupirocin   Nasal BID    OLANZapine zydis  5 mg Oral QHS    polyethylene glycol  17 g Oral Daily    potassium chloride SA  20 mEq Oral Daily    senna-docusate 8.6-50 mg  1 tablet Oral BID    tiotropium bromide  2 puff Inhalation Daily     PRN Meds:acetaminophen, LORazepam, melatonin, naloxone, ondansetron, oxyCODONE, prochlorperazine, sodium chloride 0.9%    Family History       Problem Relation (Age of Onset)    Cancer Father, Sister    Diabetes Mother    Heart defect Mother    No Known Problems Son          Tobacco Use    Smoking status: Former     Current packs/day: 0.00     Average packs/day: 1 pack/day for 25.0 years (25.0 ttl pk-yrs)     Types: Cigarettes     Start date: 1995     Quit date: 2020     Years since quitting: 3.6    Smokeless tobacco: Never   Substance and Sexual Activity    Alcohol use: Yes     Comment: 11/3/22: Occ.    Drug use: Never    Sexual activity: Yes     Partners: Female       Review of Systems   Unable to perform ROS: Mental status  change     Objective:     Vital Signs (Most Recent):  Temp: 98.1 °F (36.7 °C) (08/25/23 0507)  Pulse: 98 (08/25/23 0827)  Resp: 18 (08/25/23 0827)  BP: 100/64 (08/25/23 0507)  SpO2: (!) 94 % (08/25/23 0827) Vital Signs (24h Range):  Temp:  [97.9 °F (36.6 °C)-98.1 °F (36.7 °C)] 98.1 °F (36.7 °C)  Pulse:  [] 98  Resp:  [16-20] 18  SpO2:  [93 %-97 %] 94 %  BP: ()/(60-69) 100/64     Weight: 66.5 kg (146 lb 9.7 oz)  Body mass index is 21.04 kg/m².       Physical Exam  Constitutional:       Appearance: He is ill-appearing.   HENT:      Head: Normocephalic and atraumatic.      Right Ear: External ear normal.      Left Ear: External ear normal.      Nose: Nose normal.      Mouth/Throat:      Mouth: Mucous membranes are dry.   Eyes:      Conjunctiva/sclera: Conjunctivae normal.   Cardiovascular:      Rate and Rhythm: Normal rate.   Pulmonary:      Breath sounds: Wheezing present.   Abdominal:      General: Bowel sounds are normal.      Palpations: Abdomen is soft.   Musculoskeletal:         General: Tenderness present.   Lymphadenopathy:      Cervical: No cervical adenopathy.   Skin:     General: Skin is dry.   Neurological:      Mental Status: He is disoriented.            Review of Symptoms      Symptom Assessment (ESAS 0-10 Scale)  Unable to complete assessment due to Mental status change         Pain Assessment in Advanced Demential Scale (PAINAD)   Breathing - Independent of vocalization:  1  Negative vocalization:  0  Facial expression:  1  Body language:  1  Consolability:  1  Total:  4    Living Arrangements:  Lives with spouse    Psychosocial/Cultural:   See Palliative Psychosocial Note: No   to wife for 38 years  **Primary  to Follow**  Palliative Care  Consult: No    Spiritual:  F - Noa and Belief:  Jew - raised [employer], but attended [employer] with wife once they got   A - Address in Care:  Engaged spiritual support        Advance Care  "Planning  Advance Directives:   Living Will: No    LaPOST: No    Do Not Resuscitate Status: Yes    Medical Power of : No      Decision Making:  Family answered questions and Patient unable to communicate due to disease severity/cognitive impairment  Goals of Care: What is most important right now is to focus on curative/life-prolongation (regardless of treatment burdens). Accordingly, we have decided that the best plan to meet the patient's goals includes continuing with treatment.         Significant Labs: CBC:   Recent Labs   Lab 08/24/23 0440   WBC 16.74*   HGB 13.5*   HCT 42.1          CMP:   Recent Labs   Lab 08/24/23 0441      K 4.0      CO2 23   GLU 94   BUN 11   CREATININE 0.6   CALCIUM 8.7   PROT 6.2   ALBUMIN 2.3*   BILITOT 0.9   ALKPHOS 120   AST 51*   ALT 15   ANIONGAP 13       CBC:   Recent Labs   Lab 08/24/23 0440   WBC 16.74*   HGB 13.5*   HCT 42.1   MCV 89          BMP:  No results for input(s): "GLU", "NA", "K", "CL", "CO2", "BUN", "CREATININE", "CALCIUM", "MG" in the last 24 hours.    LFT:  Lab Results   Component Value Date    AST 51 (H) 08/24/2023    ALKPHOS 120 08/24/2023    BILITOT 0.9 08/24/2023     Albumin:   Albumin   Date Value Ref Range Status   08/24/2023 2.3 (L) 3.5 - 5.2 g/dL Final     Protein:   Total Protein   Date Value Ref Range Status   08/24/2023 6.2 6.0 - 8.4 g/dL Final     Lactic acid:   Lab Results   Component Value Date    LACTATE 2.2 08/20/2023    LACTATE 3.7 (HH) 03/16/2023       Significant Imaging: I have reviewed all pertinent imaging results/findings within the past 24 hours.    I spent a total of 50 minutes on the day of the visit. This includes face to face time in discussion of goals of care, symptom assessment, coordination of care and emotional support. This also includes non-face to face time preparing to see the patient (eg, review of tests/imaging), obtaining and/or reviewing separately obtained history, documenting clinical " information in the electronic or other health record, independently interpreting results and communicating results to the patient/family/caregiver, or care coordinator.         Kacie Huston MD  Palliative Medicine  [facility] - Intensive Care ([facility]

## 2023-08-25 NOTE — PLAN OF CARE
"[facility]  [address]  [facility] [address]  Phone: [phone]    Nursing Home Orders      08/29/2023      Admit To Nursing Home:  With Hospice Consult      Diagnoses:  Active Hospital Problems    Diagnosis  POA    *Encephalopathy, metabolic [G93.41]  Yes     Priority: 1 - High    Squamous cell carcinoma of lung [C34.90]  Yes     Priority: 2     Secondary malignant neoplasm of bone [C79.51]  Yes     Priority: 2      S/p palliative radiation to rib in 2019 with Dr. Bolden      Lung cancer, main bronchus, left [C34.02]  Yes     Priority: 2      Chronic     8/30/2019 underwent bronchoscopy that showed "A partially obstructing (about 80% obstructed) mass was found in the middle portion in the left mainstem bronchus. The mass was large and bloody, circumferential, endobronchial, friable and necrotic. 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  No actionable mutations and PD-L1 5%.  9/19/21 staging PET CT showed "Hypermetabolic left lung mass concerning for primary pulmonary malignancy with metastatic disease involving the right 6th and 9th ribs as well as mediastinal and left supraclavicular lymph nodes."  Stage IV squamous cell carcinoma of the lung at diagnosis.    10/8/2019-10/21/2019 he received palliative chemoradiation for rib pain with carboplatin and paclitaxel.  He received 3 cycles of carboplatin and paclitaxel.  He developed anaphylactic reaction to paclitaxel.  The chest was treated with AP:PA fields and the right 9th rib was treated with anterior and posterior oblique fields at 300 cGy per fraction to 3000 cGy.   Lt chest 6X 300 10 / 10 3,000   Rt 9th rib 6X 300 10 / 10 3,000     10/31/2019-9/10/2020 he received pembrolizumab (completed 15 cycles, last dose 8/21/2020)  9/10/2020 PET CT showed progression of disease.  He was then referred to Dr. Key for evaluation for clinical trials.  10/20/2020 " "he underwent repeat biopsy for LUNG MAP trial and was randomized to Ramucirumab + pembrolizumab.    11/17/2020 started ramucirumab+pembrolizumab.  Completed 4 cycles and then 2/10/2021 scans showed progression.    2/24/2021 he was subsequent started on gemcitabine with Dr. Cruz.      Acute deep vein thrombosis (DVT) of left upper extremity [I82.622]  No    Encounter for palliative care [Z51.5]  Not Applicable    ACP (advance care planning) [Z71.89]  Not Applicable    Fall [W19.XXXA]  Yes    Left arm weakness [R29.898]  Yes    Persistent atrial fibrillation [I48.19]  Yes    COPD (chronic obstructive pulmonary disease) [J44.9]  Yes    Coronary artery disease involving native coronary artery of native heart without angina pectoris [I25.10]  Yes     Chronic    PAD (peripheral artery disease) [I73.9]  Yes     LUIS FELIPE 3/11/22      Chronic diastolic heart failure [I50.32]  Yes    Hypokalemia [E87.6]  Yes    Essential hypertension [I10]  Yes     Chronic    Bronchiectasis without complication [J47.9]  Yes     CT Chest Abdomen Pelvis-9/24/2021   "Bronchiectasis and scarring noted within the left lower lobe."           Resolved Hospital Problems   No resolved problems to display.         Patient is homebound due to:  Encephalopathy, metabolic      Allergies:Review of patient's allergies indicates:  No Known Allergies      Code Status:    Code Status: DNR      Vitals:  Routine       Diet: Full liquid      Referrals/ Consults     Hospice Consult      Activities:   activity as tolerated      Wound Care Orders:  Assure waffle mattress overlay is inflated to the proper air amount, perform hand to verify proper immersion.    Assure Q2H turn protocol continues to be implemented.      Nursing Precautions:    Pressure ulcer prevention      Miscellaneous:   Home Oxygen:  Oxygen at 2 L/min nasal canula to be used:  Continuously.        Medications: Discontinue all previous medication orders, if any. See new list below.     Eliquis 10mg BID " through 9/2/23, and then Eliquis 5mg BID starting 9/3/23    Current Discharge Medication List        START taking these medications    Details   morphine (MS CONTIN) 15 MG 12 hr tablet Take 1 tablet (15 mg total) by mouth every 12 (twelve) hours. for 7 days  Qty: 14 tablet, Refills: 0    Comments: Quantity prescribed more than 7 day supply? No      OLANZapine zydis (ZYPREXA) 5 MG TbDL Take 1 tablet (5 mg total) by mouth every evening.  Qty: 30 tablet, Refills: 11           CONTINUE these medications which have CHANGED    Details   !! apixaban (ELIQUIS) 5 mg Tab Take 2 tablets (10 mg total) by mouth 2 (two) times daily. for 7 days  Qty: 28 tablet, Refills: 0      !! apixaban (ELIQUIS) 5 mg Tab Take 1 tablet (5 mg total) by mouth 2 (two) times daily.  Qty: 60 tablet, Refills: 1      oxyCODONE (ROXICODONE) 10 mg Tab immediate release tablet Take 1 tablet (10 mg total) by mouth every 4 (four) hours as needed for Pain.  Qty: 30 tablet, Refills: 0    Comments: Quantity prescribed more than 7 day supply? No       !! - Potential duplicate medications found. Please discuss with provider.        CONTINUE these medications which have NOT CHANGED    Details   albuterol (VENTOLIN HFA) 90 mcg/actuation inhaler Inhale 2 puffs into the lungs every 6 (six) hours as needed for Wheezing. Rescue  Qty: 18 g, Refills: 11      albuterol-ipratropium (DUO-NEB) 2.5 mg-0.5 mg/3 mL nebulizer solution Inhale contents of 1 vial (3 mLs) by nebulization every 4 (four) hours as needed for Wheezing or Shortness of Breath. Rescue  Qty: 90 mL, Refills: 1      ascorbic acid-multivit-min (VITAMIN C ENERGY BOOSTER) 1,000 mg PwEP Take 3,000 mg by mouth once daily. Patient takes 3,000 mg per day      aspirin 81 MG Chew Take 1 tablet (81 mg total) by mouth once daily.  Qty: 30 tablet, Refills: 11      atorvastatin (LIPITOR) 80 MG tablet Take 1 tablet (80 mg total) by mouth once daily.  Qty: 90 tablet, Refills: 3      cilostazoL (PLETAL) 100 MG Tab TAKE 1  TABLET(100 MG) BY MOUTH TWICE DAILY  Qty: 60 tablet, Refills: 11    Associated Diagnoses: Peripheral arterial disease      furosemide (LASIX) 40 MG tablet Take 1 tablet (40 mg total) by mouth 2 (two) times a day.  Qty: 60 tablet, Refills: 2    Associated Diagnoses: Localized edema      metoprolol tartrate (LOPRESSOR) 50 MG tablet Take 1 tablet (50 mg total) by mouth 3 (three) times daily.  Qty: 90 tablet, Refills: 11    Comments: .      tiotropium (SPIRIVA) 18 mcg inhalation capsule Inhale 1 capsule (18 mcg total) into the lungs via handihaler once daily. Controller  Qty: 30 capsule, Refills: 1      albuterol (PROVENTIL) 2.5 mg /3 mL (0.083 %) nebulizer solution Take 3 mLs by nebulization every 6 (six) hours as needed for Shortness of Breath.      KLOR-CON M10 10 mEq tablet Take 10 mEq by mouth once daily.           STOP taking these medications       amLODIPine (NORVASC) 10 MG tablet Comments:   Reason for Stopping:                 Future Appointments   Date Time Provider Department Center   9/14/2023  2:00 PM Eduardo Ruiz MD [facility]           Celine Love MD  [facility]

## 2023-08-25 NOTE — SUBJECTIVE & OBJECTIVE
Interval History: No acute events overnight.  Working on hospice placement but financials are an issue.  Increased Metoprolol for afib with RVR.    Review of Systems   Unable to perform ROS: Mental status change     Objective:     Vital Signs (Most Recent):  Temp: 99 °F (37.2 °C) (08/25/23 0925)  Pulse: (!) 112 (08/25/23 0940)  Resp: 16 (08/25/23 1048)  BP: 100/67 (08/25/23 0940)  SpO2: (!) 94 % (08/25/23 0827) Vital Signs (24h Range):  Temp:  [97.9 °F (36.6 °C)-99 °F (37.2 °C)] 99 °F (37.2 °C)  Pulse:  [] 112  Resp:  [16-20] 16  SpO2:  [93 %-97 %] 94 %  BP: ()/(58-69) 100/67     Weight: 66.5 kg (146 lb 9.7 oz)  Body mass index is 21.04 kg/m².    Intake/Output Summary (Last 24 hours) at 8/25/2023 1053  Last data filed at 8/25/2023 0745  Gross per 24 hour   Intake 120 ml   Output --   Net 120 ml           Physical Exam  Constitutional:       Appearance: He is well-developed. He is ill-appearing.   HENT:      Head: Normocephalic and atraumatic.   Cardiovascular:      Rate and Rhythm: Normal rate and regular rhythm.      Heart sounds: No murmur heard.  Pulmonary:      Effort: Pulmonary effort is normal. No respiratory distress.      Breath sounds: Normal breath sounds. No wheezing or rales.   Abdominal:      General: There is no distension.      Palpations: Abdomen is soft.      Tenderness: There is no abdominal tenderness.   Musculoskeletal:         General: No deformity.   Skin:     General: Skin is warm.   Neurological:      Mental Status: He is alert. He is disoriented.             Significant Labs: All pertinent labs within the past 24 hours have been reviewed.  CBC:   Recent Labs   Lab 08/24/23  0440   WBC 16.74*   HGB 13.5*   HCT 42.1          CMP:   Recent Labs   Lab 08/24/23  0441      K 4.0      CO2 23   GLU 94   BUN 11   CREATININE 0.6   CALCIUM 8.7   PROT 6.2   ALBUMIN 2.3*   BILITOT 0.9   ALKPHOS 120   AST 51*   ALT 15   ANIONGAP 13         Significant Imaging: I have  reviewed all pertinent imaging results/findings within the past 24 hours.

## 2023-08-25 NOTE — SUBJECTIVE & OBJECTIVE
"Interval History: Awake, but speech difficult to understand, picking at things with his hands then guards his left arm when I touch it. I asked him if he was in pain and he said, "No." I assessed his left swollen arm and he guarded it and tried to push my hand away.    Past Medical History:   Diagnosis Date    Chronic obstructive pulmonary disease with acute exacerbation 9/5/2022    Combined systolic and diastolic heart failure     Dependence on supplemental oxygen 5/24/2022    History of completed stroke 9/3/2019     09/03/2019 On CT exam    History of non-ST elevation myocardial infarction (NSTEMI) 2/22/2022    Hypertension     Lung cancer     NSCLC    Lung mass     left lung    Macrocytic anemia 8/24/2019    PAD (peripheral artery disease) 3/14/2022    LUIS FELIPE 3/11/22    Peripheral artery disease        Past Surgical History:   Procedure Laterality Date    BRONCHOSCOPY N/A 08/30/2019    Procedure: Bronchoscopy;  Surgeon: Miguel Diagnostic Provider;  Location: [address];  Service: Anesthesiology;  Laterality: N/A;    CORONARY ANGIOGRAPHY N/A 03/04/2022    Procedure: ANGIOGRAM, CORONARY ARTERY;  Surgeon: Robson Green MD;  Location: [facility] CATH LAB;  Service: Cardiology;  Laterality: N/A;    INSERTION OF TUNNELED CENTRAL VENOUS CATHETER (CVC) WITH SUBCUTANEOUS PORT      MEDIPORT REMOVAL Right 3/21/2023    Procedure: REMOVAL, CATHETER, CENTRAL VENOUS, TUNNELED, WITH PORT;  Surgeon: Eddi Hernandez MD;  Location: [facility];  Service: General;  Laterality: Right;       Review of patient's allergies indicates:  No Known Allergies    Medications:  Continuous Infusions:      Scheduled Meds:   albuterol-ipratropium  3 mL Nebulization Q6H WAKE    apixaban  5 mg Oral BID    aspirin  81 mg Oral Daily    atorvastatin  80 mg Oral Daily    cilostazoL  100 mg Oral BID    folic acid  1 mg Oral Daily    metoprolol tartrate  50 mg Oral TID    morphine  15 mg Oral Q12H    mupirocin   Nasal BID    OLANZapine zydis  5 mg Oral QHS    " polyethylene glycol  17 g Oral Daily    potassium chloride SA  20 mEq Oral Daily    senna-docusate 8.6-50 mg  1 tablet Oral BID    tiotropium bromide  2 puff Inhalation Daily     PRN Meds:acetaminophen, LORazepam, melatonin, naloxone, ondansetron, oxyCODONE, prochlorperazine, sodium chloride 0.9%    Family History       Problem Relation (Age of Onset)    Cancer Father, Sister    Diabetes Mother    Heart defect Mother    No Known Problems Son          Tobacco Use    Smoking status: Former     Current packs/day: 0.00     Average packs/day: 1 pack/day for 25.0 years (25.0 ttl pk-yrs)     Types: Cigarettes     Start date: 1995     Quit date: 2020     Years since quitting: 3.6    Smokeless tobacco: Never   Substance and Sexual Activity    Alcohol use: Yes     Comment: 11/3/22: Occ.    Drug use: Never    Sexual activity: Yes     Partners: Female       Review of Systems   Unable to perform ROS: Mental status change     Objective:     Vital Signs (Most Recent):  Temp: 98.1 °F (36.7 °C) (08/25/23 0507)  Pulse: 98 (08/25/23 0827)  Resp: 18 (08/25/23 0827)  BP: 100/64 (08/25/23 0507)  SpO2: (!) 94 % (08/25/23 0827) Vital Signs (24h Range):  Temp:  [97.9 °F (36.6 °C)-98.1 °F (36.7 °C)] 98.1 °F (36.7 °C)  Pulse:  [] 98  Resp:  [16-20] 18  SpO2:  [93 %-97 %] 94 %  BP: ()/(60-69) 100/64     Weight: 66.5 kg (146 lb 9.7 oz)  Body mass index is 21.04 kg/m².       Physical Exam  Constitutional:       Appearance: He is ill-appearing.   HENT:      Head: Normocephalic and atraumatic.      Right Ear: External ear normal.      Left Ear: External ear normal.      Nose: Nose normal.      Mouth/Throat:      Mouth: Mucous membranes are dry.   Eyes:      Conjunctiva/sclera: Conjunctivae normal.   Cardiovascular:      Rate and Rhythm: Normal rate.   Pulmonary:      Breath sounds: Wheezing present.   Abdominal:      General: Bowel sounds are normal.      Palpations: Abdomen is soft.   Musculoskeletal:         General: Tenderness  "present.   Lymphadenopathy:      Cervical: No cervical adenopathy.   Skin:     General: Skin is dry.   Neurological:      Mental Status: He is disoriented.            Review of Symptoms      Symptom Assessment (ESAS 0-10 Scale)  Unable to complete assessment due to Mental status change         Pain Assessment in Advanced Demential Scale (PAINAD)   Breathing - Independent of vocalization:  1  Negative vocalization:  0  Facial expression:  1  Body language:  1  Consolability:  1  Total:  4    Living Arrangements:  Lives with spouse    Psychosocial/Cultural:   See Palliative Psychosocial Note: No   to wife for 38 years  **Primary  to Follow**  Palliative Care  Consult: No    Spiritual:  F - Noa and Belief:  [employer]ian - raised Church, but attended [employer] with wife once they got   A - Address in Care:  Engaged spiritual support        Advance Care Planning   Advance Directives:   Living Will: No    LaPOST: No    Do Not Resuscitate Status: Yes    Medical Power of : No      Decision Making:  Family answered questions and Patient unable to communicate due to disease severity/cognitive impairment  Goals of Care: What is most important right now is to focus on curative/life-prolongation (regardless of treatment burdens). Accordingly, we have decided that the best plan to meet the patient's goals includes continuing with treatment.         Significant Labs: CBC:   Recent Labs   Lab 08/24/23  0440   WBC 16.74*   HGB 13.5*   HCT 42.1          CMP:   Recent Labs   Lab 08/24/23  0441      K 4.0      CO2 23   GLU 94   BUN 11   CREATININE 0.6   CALCIUM 8.7   PROT 6.2   ALBUMIN 2.3*   BILITOT 0.9   ALKPHOS 120   AST 51*   ALT 15   ANIONGAP 13       CBC:   Recent Labs   Lab 08/24/23  0440   WBC 16.74*   HGB 13.5*   HCT 42.1   MCV 89          BMP:  No results for input(s): "GLU", "NA", "K", "CL", "CO2", "BUN", "CREATININE", "CALCIUM", "MG" in the last " 24 hours.    LFT:  Lab Results   Component Value Date    AST 51 (H) 08/24/2023    ALKPHOS 120 08/24/2023    BILITOT 0.9 08/24/2023     Albumin:   Albumin   Date Value Ref Range Status   08/24/2023 2.3 (L) 3.5 - 5.2 g/dL Final     Protein:   Total Protein   Date Value Ref Range Status   08/24/2023 6.2 6.0 - 8.4 g/dL Final     Lactic acid:   Lab Results   Component Value Date    LACTATE 2.2 08/20/2023    LACTATE 3.7 (HH) 03/16/2023       Significant Imaging: I have reviewed all pertinent imaging results/findings within the past 24 hours.

## 2023-08-25 NOTE — PLAN OF CARE
"   08/25/23 0815   Post-Acute Status   Post-Acute Authorization Placement;Hospice   Post-Acute Placement Status Referrals Sent   Hospice Status Referrals Sent   Coverage MEDICARE - MEDICARE PART A & B   Patient choice form signed by patient/caregiver List from CMS Compare   Discharge Delays None known at this time   Discharge Plan   Discharge Plan A New Nursing Home placement - snf care facility  (hospice consult)     CM spoke with wife and discussed DC planning. Patient wife, Natty expressed being overwhelmed and voices feeling guilty.  CM listened as patients wife states, " I had to go home , take a shower and wash clothes.  CM informed wife that she needs time to take care of her needs and the patient needs 24/7 care that she will not be able to take on with working and taking care of her personal needs.  CM informed that she should not be worrying about taking on the responsibility of turning and lifting and patients everyday needs, her focus should be visiting and spending time with the patient and allow staff to provide the support care the patients needs.  The wife verbalized and understanding.      Patients wife does not have a preference......    NH referrals sent via [facility] Phone: [phone]  :Response: No, unable to accept patient  Reason: Care Needs Exceed Current Capacity Comments: currently no male beds available    [facility] Phone: [phone] :Response: No, unable to accept patient  Reason: No Available Bed     [facility] Phone: [phone] :Response: No, unable to accept patient  Reason: Care Needs Exceed Current Capacity    [facility] Phone: [phone]    [facility] Phone: [phone]     [facility] Phone: [phone] --willing to accept    [facility] Phone: [phone]      [facility] Phone: [phone]  willing to accept and this is the patients wife choice [[facility]}     [facility] Phone: [phone]     [facility] Phone: [phone]     [facility] Phone: [phone]      [facility] Phone: [phone]  willing to accept    [facility] Phone: [phone] : willing to accept    [facility] Phone: [phone]  willing to accept and have a male bed available    Hospice referrals sent via [facility] Phone: [phone]        [facility] [place] Phone: [phone]    [facility] Phone: [phone]      [facility] Phone: [phone] willing to accept ([facility]}     [facility] Phone: [phone]     [facility] Phone: [phone]      0815 am  CHW  completed the LOCET via phone. CM faxed PASRR to obtain the 142 for NH admission     8:33 AM  CM spoke with Carlos from [facility] and willing to accept, patient does not have a second payor that will cover room and board.  The out of pocket cost is 8700.00 a month, roughly 286.65 a day.      0915 am  This CM spoke with the patient  wife who is agreement to speak with Financial Support / Medicaid ([facility]) to start the MEDICAID SCREENING  PROCESS.     10:15 am  CM spoke with Joan KILGORE from [facility] and will call back once spouse makes a decision about placement     10:25 am   CM spoke with Yari from [facility] and will review referral and callback with an answer.    1045 am  CM spoke with Darrin from [facility] and will call back after spouse makes a decision re: placement    10:55 am  CM called and spoke with Cam [facility] and luis a and will notify once the  "spouse makes a decision.     1300  CM spoke with Donna from [facility] and will have DON review and     3:45 pm   Spouse called  and has toured [facility] and now she is in route to tour [facility] and will confirm with CM what facility she wants. " I think I am going to choose [facility]."      4:27 pm   CM uploaded NH  orders via [facility]    Rosmery Cordoba RN  Case Management  [facility]  [phone]    "

## 2023-08-25 NOTE — PT/OT/SLP PROGRESS
Speech Language Pathology Treatment    Patient Name:  Kashif Iverson   MRN:  [id number]  Admitting Diagnosis: Encephalopathy, metabolic    Recommendations:                 General Recommendations:   ongoing monitoring of diet tolerance  Diet recommendations:   , Liquid Diet Level: Full liquids, Thin   Aspiration Precautions: 1 bite/sip at a time, Alternating bites/sips, Assistance with meals, Avoid talking while eating, Eliminate distractions, Feed only when awake/alert, Frequent oral care, HOB to 90 degrees, Meds crushed in puree, Monitor for s/s of aspiration, Small bites/sips, and Strict aspiration precautions   General Precautions: Standard, aspiration, fall  Communication strategies:  go to room if call light pushed    Assessment:     Kashif Iverson is a 62 y.o. male with an SLP diagnosis of Dysphagia and increased risk of aspiration due to altered mental status.     Subjective     Pt with increased confusion and pain on this service date.        Pain/Comfort:  Pain Rating 1:  (did not rate - pt exhibited pain upon SLP's attempts to elevate HOB to prepare pt for PO intake; pt did not tolerate being upright and remain in a semi-reclined position)    Respiratory Status: Nasal cannula, flow 3 L/min    Objective:     Has the patient been evaluated by SLP for swallowing?   Yes  Keep patient NPO? No   Current Respiratory Status:        Pt seen for follow up to monitor diet tolerance.  Pt with increased confusion and pain on this service date. Pt was unable to tolerate sitting upright due to pain.  Pt remained semi-reclined during attempts to offer PO presentations. SLP attempted to offer straw sips of Boost and water via straw, but pt was too confused to accept.  SLP recommends that pt remain on full liquid diet and thin liquids, but must only be fed when awake/alert and willingly accepting PO presentations. SLP to decrease frequency to 3x/wk due to minimal progress.     Goals:   Multidisciplinary Problems       SLP Goals           Problem: SLP    Goal Priority Disciplines Outcome   SLP Goal    Low SLP Ongoing, Progressing   Description: Speech Language Pathology Goals  Goals expected to be met by 8/29:  1. Pt will tolerate least restrictive diet without s/s of aspiration.   2. Pt will participate speech/language/cognitive evaluation when appropriate.                                Plan:     Patient to be seen:  3 x/week   Plan of Care expires:  09/21/23  Plan of Care reviewed with:  patient   SLP Follow-Up:  Yes       Discharge recommendations:   (tbd)     Time Tracking:     SLP Treatment Date:   08/25/23  Speech Start Time:  1305  Speech Stop Time:  1313     Speech Total Time (min):  8 min    Billable Minutes:  Treatment Swallowing Dysfunction 8    08/25/2023

## 2023-08-25 NOTE — NURSING
Resting in bed, eyes open. Appears mildly agitated, removes sheets. Resp even and unlabored on O2 at 3L via NC. Patient declines food or drink at this time, will follow up with breakfast tray. No telemetry monitoring per order. Bed alarm on. Continue to monitor.

## 2023-08-26 PROBLEM — I82.622 ACUTE DEEP VEIN THROMBOSIS (DVT) OF LEFT UPPER EXTREMITY: Status: ACTIVE | Noted: 2023-01-01

## 2023-08-26 NOTE — ASSESSMENT & PLAN NOTE
· Noted to have swelling and warmth to LUE on 8/25  · US showed an occlusive thrombus in the left internal jugular and subclavian veins.   · Has been on Eliquis 5mg for afib, but given new DVT will start treatment dose Eliquis 10mg BID x 7 days and then 5mg BID thereafter   Azathioprine Counseling:  I discussed with the patient the risks of azathioprine including but not limited to myelosuppression, immunosuppression, hepatotoxicity, lymphoma, and infections.  The patient understands that monitoring is required including baseline LFTs, Creatinine, possible TPMP genotyping and weekly CBCs for the first month and then every 2 weeks thereafter.  The patient verbalized understanding of the proper use and possible adverse effects of azathioprine.  All of the patient's questions and concerns were addressed.

## 2023-08-26 NOTE — PLAN OF CARE
Pt Disoriented x4 and follows some commands intermittently. 3L NC. Incontinent. Bedfast. U/S of left arm performed overnight. Delirium precautions maintained. No acute events overnight. Bed in lowest position and call light within reach.

## 2023-08-26 NOTE — PROGRESS NOTES
[facility] - Intensive Care ([place] [id number])  [facility]  Progress Note    Patient Name: Kashif Iverson  MRN: [id number]  Patient Class: IP- Inpatient   Admission Date: 8/20/2023  Length of Stay: 5 days  Attending Physician: Celine Love MD  Primary Care Provider: Eduardo Ruiz MD        Subjective:     Principal Problem:Encephalopathy, metabolic        HPI:  Kashif Iverson is a 62 y.o. male, with a PMHx of PAD, diastolic CHF, COPD, non-small cell lung cancer who presents to the ED s/p fall on yesterday. Pt family reports pt slipped and fell in bathroom yesterday and afterwards was walking fine. EMS reports hypotensive bp 80/40 and heart rate 40-60 irregular. Pt given 500ml of NS per EMS. Pt placed on 2L of O2. On Today pt can't stand on his own, unable to ambulate. Pt reports leg and back pain. Leg pain is exacerbated with movement. Pt reports noticing flaccid left arm on yesterday. Patient denies lightheadedness, dizziness, or other associated symptoms. This is the extent of the patient's complaints today in the Emergency Department.CTA head,neck show,Right basal ganglia lacunar infarct.  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  Associated attenuation of the left proximal subclavian and left common carotid artery.  Pulmonary masses.   patient has severe  hypokalemia 2.4,being replaced po and IV,patient seems confused.transfer center trying sending patient [place] [facility] [place] team.pending transfer,      Overview/Hospital Course:  Mr. Iverson was admitted to Hospital Medicine for management of a fall with leg/back pain as well as flaccid left arm.  His mentation worsened.  MRI brachial plexus showing large mass within the left supraclavicular region with extesive metastasis. MRI brain and C/T/L spine were negative for mets, but was a poor study 2/2  motion.  EEG was consistent with a moderate to severe diffuse encephalopathy, but no seizures.  Chart review shows patient not candidate for further treatment. Case discussed with [facility] here who are in agreement. He was found to have an occlusive thrombus in the left internal jugular and subclavian veins.  He was started on treatment dose Eliquis.  Palliative care consulted. Family requesting DNR and home vs NH with hospice.  Family has tentatively selected [facility] with a hospice consult.      Interval History: Overnight LUE US showed an occlusive thrombus in the left internal jugular and subclavian veins.  Working on hospice placement.  Wife has tentatively selected [facility].      Review of Systems   Unable to perform ROS: Mental status change     Objective:     Vital Signs (Most Recent):  Temp: 98.2 °F (36.8 °C) (08/26/23 0753)  Pulse: 109 (08/26/23 0755)  Resp: 18 (08/26/23 0851)  BP: (!) 122/56 (08/26/23 0753)  SpO2: 96 % (08/26/23 0755) Vital Signs (24h Range):  Temp:  [98.1 °F (36.7 °C)-98.3 °F (36.8 °C)] 98.2 °F (36.8 °C)  Pulse:  [] 109  Resp:  [16-20] 18  SpO2:  [95 %-96 %] 96 %  BP: ()/(56-72) 122/56     Weight: 66.5 kg (146 lb 9.7 oz)  Body mass index is 21.04 kg/m².  No intake or output data in the 24 hours ending 08/26/23 0958        Physical Exam  Constitutional:       Appearance: He is well-developed. He is ill-appearing.   HENT:      Head: Normocephalic and atraumatic.   Cardiovascular:      Rate and Rhythm: Normal rate and regular rhythm.      Heart sounds: No murmur heard.  Pulmonary:      Effort: Pulmonary effort is normal. No respiratory distress.      Breath sounds: Normal breath sounds. No wheezing or rales.   Abdominal:      General: There is no distension.      Palpations: Abdomen is soft.      Tenderness: There is no abdominal tenderness.   Musculoskeletal:         General: Swelling (LUE) present. No deformity.   Skin:     General: Skin is warm.   Neurological:     "  Mental Status: He is alert. He is disoriented.             Significant Labs: All pertinent labs within the past 24 hours have been reviewed.  CBC:   No results for input(s): "WBC", "HGB", "HCT", "PLT" in the last 48 hours.    CMP:   No results for input(s): "NA", "K", "CL", "CO2", "GLU", "BUN", "CREATININE", "CALCIUM", "PROT", "ALBUMIN", "BILITOT", "ALKPHOS", "AST", "ALT", "ANIONGAP", "EGFRNONAA" in the last 48 hours.    Invalid input(s): "ESTGFAFR[place]"      Significant Imaging: I have reviewed all pertinent imaging results/findings within the past 24 hours.      Assessment/Plan:      * Encephalopathy, metabolic  · Metastatic SCC of lung with extensive mets s/p palliative chemoradiation  · CTA head with no acute abnormality. No large vessel occlusion. Right basal ganglia lacunar infarct.  Right frontal and right parietal encephalomalacia changes again seen.   · Neurology consulted  · Delirium precautions  · Ammonia, B12, TSH WNL. Folate deficient, will supplement  · MRI brachial plexus showing large mass with extensive invasion/mets  · MRI brain and C/T/L spine negative for mets but poor study given motion  · EEG negative for seizures but with moderate/severe encephalopathy    Squamous cell carcinoma of lung  · See above    Secondary malignant neoplasm of bone  · See above      Lung cancer, main bronchus, left  · Known SCC of the left lung with mets  · Per Onc, no further treatment options  · Palliative Care consulted      Acute deep vein thrombosis (DVT) of left upper extremity  · Noted to have swelling and warmth to LUE on 8/25  · US showed an occlusive thrombus in the left internal jugular and subclavian veins.   · Has been on Eliquis 5mg for afib, but given new DVT will start treatment dose Eliquis 10mg BID x 7 days and then 5mg BID thereafter    Fall  · Imaging negative for fracture  · Pain control with Palliative      ACP (advance care planning)  Advance Care Planning     Date: 08/23/2023    Code Status  In " light of the patients advanced and life limiting illness,I engaged the the family in a voluntary conversation about the patient's preferences for care at the very end of life. The patient wishes to continue with life prolonging measures at this time.  Along those lines, the patient does wish to have CPR or other invasive treatments performed when his heart and/or breathing stops. I communicated to the family that patient will remain FULL code at this time.  I spent a total of 15 minutes engaging the patient in this advance care planning discussion.       [facility]  I engaged the family in a voluntary conversation about advance care planning and we specifically addressed what the goals of care would be moving forward, in light of the patient's change in clinical status, specifically worsening metastatic lung cancer.  We did specifically address the patient's likely prognosis, which is poor.  We explored the patient's values and preferences for future care.  The family endorses that what is most important right now is to focus on curative/life-prolongation (regardless of treatment burdens)    Accordingly, we have decided that the best plan to meet the patient's goals includes continuing with treatment    I did not explain the role for hospice care at this stage of the patient's illness, including its ability to help the patient live with the best quality of life possible.  We will not be making a hospice referral.    I spent a total of 15 minutes engaging the patient in this advance care planning discussion.    Performed by Dr. Parnell [facility] for palliative care  · Palliative following      Left arm weakness  · MRI brachial plexus showing large mass within the left supraclavicular region with associated mediastinal, left upper lobe and left paravertebral extension and associated invasion/destruction of the left clavicle, left 1st rib and possibly the adjacent C6 through T2 vertebral  bodies.        Persistent atrial fibrillation  · Chronic issue but now with afib with RVR  · Increase Metoprolol dose    COPD (chronic obstructive pulmonary disease)  · Chronic and stable  · On 2L NC  · Duoneb PRN        Coronary artery disease involving native coronary artery of native heart without angina pectoris  · Chronic and stable  · Cont aspirin, statin      PAD (peripheral artery disease)  · Chronic and stable  · Continue Aspirin and Statin      Chronic diastolic heart failure  · No signs of decompensated HF  · Cont aspirin, metoprolol      Hypokalemia  · Resolved      Essential hypertension  · Chronic and stable  · Cont metoprolol      Bronchiectasis without complication  · Noted      Atherosclerosis of aorta  On medical treatments.        VTE Risk Mitigation (From admission, onward)         Ordered     apixaban tablet 10 mg  2 times daily         08/26/23 0630                Discharge Planning   AZ: 8/26/2023     Code Status: DNR   Is the patient medically ready for discharge?: Yes    Reason for patient still in hospital (select all that apply): Pending disposition  Discharge Plan A: New Nursing Home placement - assisted care facility (hospice consult)   Discharge Delays: None known at this time              Celine Love MD  Department of Hospital Medicine   [facility] - Intensive Care ([place])

## 2023-08-26 NOTE — SUBJECTIVE & OBJECTIVE
"Interval History: Overnight LUE US showed an occlusive thrombus in the left internal jugular and subclavian veins.  Working on hospice placement.  Wife has tentatively selected [facility].      Review of Systems   Unable to perform ROS: Mental status change     Objective:     Vital Signs (Most Recent):  Temp: 98.2 °F (36.8 °C) (08/26/23 0753)  Pulse: 109 (08/26/23 0755)  Resp: 18 (08/26/23 0851)  BP: (!) 122/56 (08/26/23 0753)  SpO2: 96 % (08/26/23 0755) Vital Signs (24h Range):  Temp:  [98.1 °F (36.7 °C)-98.3 °F (36.8 °C)] 98.2 °F (36.8 °C)  Pulse:  [] 109  Resp:  [16-20] 18  SpO2:  [95 %-96 %] 96 %  BP: ()/(56-72) 122/56     Weight: 66.5 kg (146 lb 9.7 oz)  Body mass index is 21.04 kg/m².  No intake or output data in the 24 hours ending 08/26/23 0958        Physical Exam  Constitutional:       Appearance: He is well-developed. He is ill-appearing.   HENT:      Head: Normocephalic and atraumatic.   Cardiovascular:      Rate and Rhythm: Normal rate and regular rhythm.      Heart sounds: No murmur heard.  Pulmonary:      Effort: Pulmonary effort is normal. No respiratory distress.      Breath sounds: Normal breath sounds. No wheezing or rales.   Abdominal:      General: There is no distension.      Palpations: Abdomen is soft.      Tenderness: There is no abdominal tenderness.   Musculoskeletal:         General: Swelling (LUE) present. No deformity.   Skin:     General: Skin is warm.   Neurological:      Mental Status: He is alert. He is disoriented.             Significant Labs: All pertinent labs within the past 24 hours have been reviewed.  CBC:   No results for input(s): "WBC", "HGB", "HCT", "PLT" in the last 48 hours.    CMP:   No results for input(s): "NA", "K", "CL", "CO2", "GLU", "BUN", "CREATININE", "CALCIUM", "PROT", "ALBUMIN", "BILITOT", "ALKPHOS", "AST", "ALT", "ANIONGAP", "EGFRNONAA" in the last 48 hours.    Invalid input(s): "ESTGFA[place]"      Significant Imaging: I have reviewed all " pertinent imaging results/findings within the past 24 hours.

## 2023-08-26 NOTE — ASSESSMENT & PLAN NOTE
Advance Care Planning     Date: 08/23/2023    Code Status  In light of the patients advanced and life limiting illness,I engaged the the family in a voluntary conversation about the patient's preferences for care at the very end of life. The patient wishes to continue with life prolonging measures at this time.  Along those lines, the patient does wish to have CPR or other invasive treatments performed when his heart and/or breathing stops. I communicated to the family that patient will remain FULL code at this time.  I spent a total of 15 minutes engaging the patient in this advance care planning discussion.       [facility]  I engaged the family in a voluntary conversation about advance care planning and we specifically addressed what the goals of care would be moving forward, in light of the patient's change in clinical status, specifically worsening metastatic lung cancer.  We did specifically address the patient's likely prognosis, which is poor.  We explored the patient's values and preferences for future care.  The family endorses that what is most important right now is to focus on curative/life-prolongation (regardless of treatment burdens)    Accordingly, we have decided that the best plan to meet the patient's goals includes continuing with treatment    I did not explain the role for hospice care at this stage of the patient's illness, including its ability to help the patient live with the best quality of life possible.  We will not be making a hospice referral.    I spent a total of 15 minutes engaging the patient in this advance care planning discussion.    Performed by Dr. Parmjit Guevara

## 2023-08-26 NOTE — PLAN OF CARE
RODOLFO spoke to Anjelica and Supervisor, Marli at [facility] p) [phone] regarding pt's possible admission into the facility. RODOLFO informed by Anjelica that pt. Is not being accepted this weekend into the facility. SW contacting pt's wife, Natty to discuss. States she is still exploring facilities and was not aware that pt. was being discharged today. Natty becoming tearful over the phone as she states this decision-making for NH is difficult for her.  Choosing [facility]. Pt has 2 accepting facilities in [facility], which are [facility] & [facility].Natty plans to visit both [facility] & [facility] on Monday, 8/28/23. SW informing Natty that pt. is ready for discharge. Natty was preparing for a discharge date of 08/29/23 as she discharge planned with Nurse-Rosmery TROY.     Gabe Ordonez LMSW

## 2023-08-27 NOTE — ASSESSMENT & PLAN NOTE
Advance Care Planning     Date: 08/23/2023    Code Status  In light of the patients advanced and life limiting illness,I engaged the the family in a voluntary conversation about the patient's preferences for care at the very end of life. The patient wishes to continue with life prolonging measures at this time.  Along those lines, the patient does wish to have CPR or other invasive treatments performed when his heart and/or breathing stops. I communicated to the family that patient will remain FULL code at this time.  I spent a total of 15 minutes engaging the patient in this advance care planning discussion.       [facility]  I engaged the family in a voluntary conversation about advance care planning and we specifically addressed what the goals of care would be moving forward, in light of the patient's change in clinical status, specifically worsening metastatic lung cancer.  We did specifically address the patient's likely prognosis, which is poor.  We explored the patient's values and preferences for future care.  The family endorses that what is most important right now is to focus on curative/life-prolongation (regardless of treatment burdens)    Accordingly, we have decided that the best plan to meet the patient's goals includes continuing with treatment    I did not explain the role for hospice care at this stage of the patient's illness, including its ability to help the patient live with the best quality of life possible.  We will not be making a hospice referral.    I spent a total of 15 minutes engaging the patient in this advance care planning discussion.    Performed by Dr. Parmjit Guevara

## 2023-08-27 NOTE — ASSESSMENT & PLAN NOTE
· Noted to have swelling and warmth to LUE on 8/25  · US showed an occlusive thrombus in the left internal jugular and subclavian veins.   · Has been on Eliquis 5mg for afib, but given new DVT will start treatment dose Eliquis 10mg BID x 7 days and then 5mg BID thereafter

## 2023-08-27 NOTE — PLAN OF CARE
Problem: Coping Ineffective  Goal: Effective Coping  Outcome: Ongoing, Progressing  Intervention: Support and Enhance Coping Strategies  Flowsheets (Taken 8/26/2023 2018)  Supportive Measures:   active listening utilized   self-care encouraged  Environmental Support: calm environment promoted     Problem: Adult Inpatient Plan of Care  Goal: Plan of Care Review  Outcome: Ongoing, Progressing  Flowsheets (Taken 8/26/2023 2018)  Plan of Care Reviewed With: patient  Goal: Patient-Specific Goal (Individualized)  Outcome: Ongoing, Progressing  Goal: Absence of Hospital-Acquired Illness or Injury  Outcome: Ongoing, Progressing  Intervention: Identify and Manage Fall Risk  Flowsheets (Taken 8/26/2023 2018)  Safety Promotion/Fall Prevention:   assistive device/personal item within reach   side rails raised x 2

## 2023-08-27 NOTE — PLAN OF CARE
Problem: Coping Ineffective  Goal: Effective Coping  Outcome: Ongoing, Progressing     Problem: Adult Inpatient Plan of Care  Goal: Plan of Care Review  Outcome: Ongoing, Progressing  Goal: Patient-Specific Goal (Individualized)  Outcome: Ongoing, Progressing  Goal: Absence of Hospital-Acquired Illness or Injury  Outcome: Ongoing, Progressing  Goal: Optimal Comfort and Wellbeing  Outcome: Ongoing, Progressing  Goal: Readiness for Transition of Care  Outcome: Ongoing, Progressing     Problem: Fall Injury Risk  Goal: Absence of Fall and Fall-Related Injury  Outcome: Ongoing, Progressing     Problem: Skin Injury Risk Increased  Goal: Skin Health and Integrity  Outcome: Ongoing, Progressing     Problem: Impaired Wound Healing  Goal: Optimal Wound Healing  Outcome: Ongoing, Progressing     Problem: Infection  Goal: Absence of Infection Signs and Symptoms  Outcome: Ongoing, Progressing    Pt remains stable. No new complaints. Needs addressed at this time.

## 2023-08-27 NOTE — PROGRESS NOTES
[facility] - Intensive Care ([place] [id number])  [facility] Medicine  Progress Note    Patient Name: Kashif Iverson  MRN: [id number]  Patient Class: IP- Inpatient   Admission Date: 8/20/2023  Length of Stay: 6 days  Attending Physician: Celine Love MD  Primary Care Provider: Eduardo Ruiz MD        Subjective:     Principal Problem:Encephalopathy, metabolic        HPI:  Kashif Iverson is a 62 y.o. male, with a PMHx of PAD, diastolic CHF, COPD, non-small cell lung cancer who presents to the ED s/p fall on yesterday. Pt family reports pt slipped and fell in bathroom yesterday and afterwards was walking fine. EMS reports hypotensive bp 80/40 and heart rate 40-60 irregular. Pt given 500ml of NS per EMS. Pt placed on 2L of O2. On Today pt can't stand on his own, unable to ambulate. Pt reports leg and back pain. Leg pain is exacerbated with movement. Pt reports noticing flaccid left arm on yesterday. Patient denies lightheadedness, dizziness, or other associated symptoms. This is the extent of the patient's complaints today in the Emergency Department.CTA head,neck show,Right basal ganglia lacunar infarct.  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  Associated attenuation of the left proximal subclavian and left common carotid artery.  Pulmonary masses.   patient has severe  hypokalemia 2.4,being replaced po and IV,patient seems confused.transfer center trying sending patient to [facility] for multidisciplinary team.pending transfer,      Overview/Hospital Course:  Mr. Iverson was admitted to Hospital Medicine for management of a fall with leg/back pain as well as flaccid left arm.  His mentation worsened.  MRI brachial plexus showing large mass within the left supraclavicular region with extesive metastasis. MRI brain and C/T/L spine were negative for mets, but was a poor study 2/2  motion.  EEG was consistent with a moderate to severe diffuse encephalopathy, but no seizures.  Chart review shows patient not candidate for further treatment. Case discussed with [facility] here who are in agreement. He was found to have an occlusive thrombus in the left internal jugular and subclavian veins.  He was started on treatment dose Eliquis.  Palliative care consulted. Family requesting DNR and home vs NH with hospice.  Family has tentatively selected [facility] with a hospice consult.      Interval History: No acute events overnight.  Per weekend CM, wife planned for DC on 8/29 after discussions with weekday CM Rosmery.  Still disoriented and confused.    Review of Systems   Unable to perform ROS: Mental status change     Objective:     Vital Signs (Most Recent):  Temp: 98.4 °F (36.9 °C) (08/27/23 0708)  Pulse: 74 (08/27/23 0915)  Resp: 20 (08/27/23 0915)  BP: (!) 100/57 (08/27/23 0708)  SpO2: (!) 92 % (08/27/23 0915) Vital Signs (24h Range):  Temp:  [97.8 °F (36.6 °C)-98.5 °F (36.9 °C)] 98.4 °F (36.9 °C)  Pulse:  [] 74  Resp:  [16-20] 20  SpO2:  [92 %-99 %] 92 %  BP: (100-113)/(57-97) 100/57     Weight: 66.5 kg (146 lb 9.7 oz)  Body mass index is 21.04 kg/m².  No intake or output data in the 24 hours ending 08/27/23 0959        Physical Exam  Constitutional:       Appearance: He is well-developed. He is ill-appearing.   HENT:      Head: Normocephalic and atraumatic.   Cardiovascular:      Rate and Rhythm: Normal rate and regular rhythm.      Heart sounds: No murmur heard.  Pulmonary:      Effort: Pulmonary effort is normal. No respiratory distress.      Breath sounds: Normal breath sounds. No wheezing or rales.   Abdominal:      General: There is no distension.      Palpations: Abdomen is soft.      Tenderness: There is no abdominal tenderness.   Musculoskeletal:         General: Swelling (LUE) present. No deformity.   Skin:     General: Skin is warm.   Neurological:      Mental Status: He is  "alert. He is disoriented.             Significant Labs: All pertinent labs within the past 24 hours have been reviewed.  CBC:   No results for input(s): "WBC", "HGB", "HCT", "PLT" in the last 48 hours.    CMP:   No results for input(s): "NA", "K", "CL", "CO2", "GLU", "BUN", "CREATININE", "CALCIUM", "PROT", "ALBUMIN", "BILITOT", "ALKPHOS", "AST", "ALT", "ANIONGAP", "EGFRNONAA" in the last 48 hours.    Invalid input(s): "ESTGFAFR[place]"      Significant Imaging: I have reviewed all pertinent imaging results/findings within the past 24 hours.      Assessment/Plan:      * Encephalopathy, metabolic  · Metastatic SCC of lung with extensive mets s/p palliative chemoradiation  · CTA head with no acute abnormality. No large vessel occlusion. Right basal ganglia lacunar infarct.  Right frontal and right parietal encephalomalacia changes again seen.   · Neurology consulted  · Delirium precautions  · Ammonia, B12, TSH WNL. Folate deficient, will supplement  · MRI brachial plexus showing large mass with extensive invasion/mets  · MRI brain and C/T/L spine negative for mets but poor study given motion  · EEG negative for seizures but with moderate/severe encephalopathy    Squamous cell carcinoma of lung  · See above    Secondary malignant neoplasm of bone  · See above      Lung cancer, main bronchus, left  · Known SCC of the left lung with mets  · Per Onc, no further treatment options  · Palliative Care consulted      Acute deep vein thrombosis (DVT) of left upper extremity  · Noted to have swelling and warmth to LUE on 8/25  · US showed an occlusive thrombus in the left internal jugular and subclavian veins.   · Has been on Eliquis 5mg for afib, but given new DVT will start treatment dose Eliquis 10mg BID x 7 days and then 5mg BID thereafter    Fall  · Imaging negative for fracture  · Pain control with Palliative      ACP (advance care planning)  Advance Care Planning     Date: 08/23/2023    Code Status  In light of the patients " advanced and life limiting illness,I engaged the the family in a voluntary conversation about the patient's preferences for care at the very end of life. The patient wishes to continue with life prolonging measures at this time.  Along those lines, the patient does wish to have CPR or other invasive treatments performed when his heart and/or breathing stops. I communicated to the family that patient will remain FULL code at this time.  I spent a total of 15 minutes engaging the patient in this advance care planning discussion.       [facility]  I engaged the family in a voluntary conversation about advance care planning and we specifically addressed what the goals of care would be moving forward, in light of the patient's change in clinical status, specifically worsening metastatic lung cancer.  We did specifically address the patient's likely prognosis, which is poor.  We explored the patient's values and preferences for future care.  The family endorses that what is most important right now is to focus on curative/life-prolongation (regardless of treatment burdens)    Accordingly, we have decided that the best plan to meet the patient's goals includes continuing with treatment    I did not explain the role for hospice care at this stage of the patient's illness, including its ability to help the patient live with the best quality of life possible.  We will not be making a hospice referral.    I spent a total of 15 minutes engaging the patient in this advance care planning discussion.    Performed by Dr. Parnell [facility] for palliative care  · Palliative following      Left arm weakness  · MRI brachial plexus showing large mass within the left supraclavicular region with associated mediastinal, left upper lobe and left paravertebral extension and associated invasion/destruction of the left clavicle, left 1st rib and possibly the adjacent C6 through T2 vertebral bodies.        Persistent atrial  fibrillation  · Chronic issue but now with afib with RVR  · Increase Metoprolol dose    COPD (chronic obstructive pulmonary disease)  · Chronic and stable  · On 2L NC  · Duoneb PRN        Coronary artery disease involving native coronary artery of native heart without angina pectoris  · Chronic and stable  · Cont aspirin, statin      PAD (peripheral artery disease)  · Chronic and stable  · Continue Aspirin and Statin      Chronic diastolic heart failure  · No signs of decompensated HF  · Cont aspirin, metoprolol      Hypokalemia  · Resolved      Essential hypertension  · Chronic and stable  · Cont metoprolol      Bronchiectasis without complication  · Noted      Atherosclerosis of aorta  On medical treatments.        VTE Risk Mitigation (From admission, onward)         Ordered     apixaban tablet 10 mg  2 times daily         08/26/23 0630                Discharge Planning   AZ: 8/26/2023     Code Status: DNR   Is the patient medically ready for discharge?: Yes    Reason for patient still in hospital (select all that apply): Pending disposition  Discharge Plan A: New Nursing Home placement - [facility] care facility (hospice consult)   Discharge Delays: None known at this time              Celine Love MD  Department of Hospital Medicine   [facility] - Intensive Care ([facility]

## 2023-08-27 NOTE — SUBJECTIVE & OBJECTIVE
"Interval History: No acute events overnight.  Per weekend WOODROW, wife planned for DC on 8/29 after discussions with weekday WOODROW Botello.  Still disoriented and confused.    Review of Systems   Unable to perform ROS: Mental status change     Objective:     Vital Signs (Most Recent):  Temp: 98.4 °F (36.9 °C) (08/27/23 0708)  Pulse: 74 (08/27/23 0915)  Resp: 20 (08/27/23 0915)  BP: (!) 100/57 (08/27/23 0708)  SpO2: (!) 92 % (08/27/23 0915) Vital Signs (24h Range):  Temp:  [97.8 °F (36.6 °C)-98.5 °F (36.9 °C)] 98.4 °F (36.9 °C)  Pulse:  [] 74  Resp:  [16-20] 20  SpO2:  [92 %-99 %] 92 %  BP: (100-113)/(57-97) 100/57     Weight: 66.5 kg (146 lb 9.7 oz)  Body mass index is 21.04 kg/m².  No intake or output data in the 24 hours ending 08/27/23 0959        Physical Exam  Constitutional:       Appearance: He is well-developed. He is ill-appearing.   HENT:      Head: Normocephalic and atraumatic.   Cardiovascular:      Rate and Rhythm: Normal rate and regular rhythm.      Heart sounds: No murmur heard.  Pulmonary:      Effort: Pulmonary effort is normal. No respiratory distress.      Breath sounds: Normal breath sounds. No wheezing or rales.   Abdominal:      General: There is no distension.      Palpations: Abdomen is soft.      Tenderness: There is no abdominal tenderness.   Musculoskeletal:         General: Swelling (LUE) present. No deformity.   Skin:     General: Skin is warm.   Neurological:      Mental Status: He is alert. He is disoriented.             Significant Labs: All pertinent labs within the past 24 hours have been reviewed.  CBC:   No results for input(s): "WBC", "HGB", "HCT", "PLT" in the last 48 hours.    CMP:   No results for input(s): "NA", "K", "CL", "CO2", "GLU", "BUN", "CREATININE", "CALCIUM", "PROT", "ALBUMIN", "BILITOT", "ALKPHOS", "AST", "ALT", "ANIONGAP", "EGFRNONAA" in the last 48 hours.    Invalid input(s): "ESTGFA[place]"      Significant Imaging: I have reviewed all pertinent imaging " results/findings within the past 24 hours.

## 2023-08-27 NOTE — PLAN OF CARE
Disoriented follows commands intermittently. 3LNC. Pt very agitated and combative with periods of rest. No acute events overnight. Delirium precautions maintained. Bed in lowest position and call light within reach.

## 2023-08-28 NOTE — ASSESSMENT & PLAN NOTE
Advance Care Planning     Date: 08/23/2023    Code Status  In light of the patients advanced and life limiting illness,I engaged the the family in a voluntary conversation about the patient's preferences for care at the very end of life. The patient wishes to continue with life prolonging measures at this time.  Along those lines, the patient does wish to have CPR or other invasive treatments performed when his heart and/or breathing stops. I communicated to the family that patient will remain FULL code at this time.  I spent a total of 15 minutes engaging the patient in this advance care planning discussion.       [facility]  I engaged the family in a voluntary conversation about advance care planning and we specifically addressed what the goals of care would be moving forward, in light of the patient's change in clinical status, specifically worsening metastatic lung cancer.  We did specifically address the patient's likely prognosis, which is poor.  We explored the patient's values and preferences for future care.  The family endorses that what is most important right now is to focus on curative/life-prolongation (regardless of treatment burdens)    Accordingly, we have decided that the best plan to meet the patient's goals includes continuing with treatment    I did not explain the role for hospice care at this stage of the patient's illness, including its ability to help the patient live with the best quality of life possible.  We will not be making a hospice referral.    I spent a total of 15 minutes engaging the patient in this advance care planning discussion.    Performed by Dr. Parmjit Guevara

## 2023-08-28 NOTE — PLAN OF CARE
"   08/28/23 0945   Discharge Reassessment   Assessment Type Discharge Planning Reassessment   Discharge Plan discussed with: Spouse/sig other   Name(s) and Number(s) Natty Iverson (Spouse)   [phone] ([place])   Discharge Plan A New Nursing Home placement - retirement care facility  (with hospice consult)   DME Needed Upon Discharge  none   Transition of Care Barriers None   Why the patient remains in the hospital Placement issues   Post-Acute Status   Post-Acute Placement Status Pending post-acute provider review/more information requested   Hospice Status Pending post acute provider review/more information requested   Coverage MEDICARE - MEDICARE PART A & B   Discharge Delays None known at this time     CM left a VM for spouse, Natty HUSTON #[phone]  to discuss  discharge planning.    CM spoke with Natty HUSTON and she was upset  after this past weekend, the  patients wife stated, " My  is not a number and I am not about to just put him anywhere. I have to feel comfortable with the place he is going to spend his last days."  The spouse request is speaking to speak with the attending. The spouse will be touring  [facility] and [facility] will tour today  and will definitely  have an answer today.   No changes in DC plans. [place]:  8/29/23    4:32 PM  CM spoke with Joan KILGORE from [facility] and  will speak with the wife at bedside and hopefully admit the patient to [facility].  A meeting is scheduled for 8/29/23 with Dr Jyoti Huston, Joan Pfeiffer [Passages ] and Mrs. Natty Iverson     Rosmery Cordoba RN  Case Management  [facility]  [phone]      "

## 2023-08-28 NOTE — PLAN OF CARE
Pt oriented to self. Follows commands intermittently. 3L NC. Occasional oral suction. Incontinent. No acute events overnight. Bed in lowest position and call light within reach.

## 2023-08-28 NOTE — SUBJECTIVE & OBJECTIVE
Interval History: No acute events overnight.  Was told on Friday by CM that he would likely DC to [facility] with Hospice on Saturday.  On Saturday, DC was planned and the weekend SW mentioned that the wife was told that DC would be on Tuesday 8/29.  Just spoke with the wife who said she was always planning for DC on Tuesday, never on Saturday.  Informed her that he has been medically ready to DC since Thursday with accepting facilities.  Acknowledged that this process is very difficult and apologized for conflicting information.  She said she is touring more facilities today.    Review of Systems   Unable to perform ROS: Mental status change     Objective:     Vital Signs (Most Recent):  Temp: 98.2 °F (36.8 °C) (08/28/23 0811)  Pulse: 60 (08/28/23 0936)  Resp: 18 (08/28/23 0936)  BP: 114/65 (08/28/23 0811)  SpO2: 97 % (08/28/23 0811) Vital Signs (24h Range):  Temp:  [97.6 °F (36.4 °C)-98.7 °F (37.1 °C)] 98.2 °F (36.8 °C)  Pulse:  [] 60  Resp:  [18-21] 18  SpO2:  [91 %-97 %] 97 %  BP: (102-114)/(56-65) 114/65     Weight: 66.5 kg (146 lb 9.7 oz)  Body mass index is 21.04 kg/m².    Intake/Output Summary (Last 24 hours) at 8/28/2023 1036  Last data filed at 8/27/2023 1717  Gross per 24 hour   Intake 220 ml   Output --   Net 220 ml           Physical Exam  Constitutional:       Appearance: He is well-developed. He is ill-appearing.   HENT:      Head: Normocephalic and atraumatic.   Cardiovascular:      Rate and Rhythm: Normal rate and regular rhythm.      Heart sounds: No murmur heard.  Pulmonary:      Effort: Pulmonary effort is normal. No respiratory distress.      Breath sounds: Normal breath sounds. No wheezing or rales.   Abdominal:      General: There is no distension.      Palpations: Abdomen is soft.      Tenderness: There is no abdominal tenderness.   Musculoskeletal:         General: Swelling (LUE) present. No deformity.   Skin:     General: Skin is warm.   Neurological:      Mental Status: He is  "alert. He is disoriented.             Significant Labs: All pertinent labs within the past 24 hours have been reviewed.  CBC:   No results for input(s): "WBC", "HGB", "HCT", "PLT" in the last 48 hours.    CMP:   No results for input(s): "NA", "K", "CL", "CO2", "GLU", "BUN", "CREATININE", "CALCIUM", "PROT", "ALBUMIN", "BILITOT", "ALKPHOS", "AST", "ALT", "ANIONGAP", "EGFRNONAA" in the last 48 hours.    Invalid input(s): "ESTGFAFR[place]"      Significant Imaging: I have reviewed all pertinent imaging results/findings within the past 24 hours.  "

## 2023-08-28 NOTE — PROGRESS NOTES
[facility] - Intensive Care ([place] [id number])  [facility] Medicine  Progress Note    Patient Name: Kashif Iverson  MRN: [id number]  Patient Class: IP- Inpatient   Admission Date: 8/20/2023  Length of Stay: 7 days  Attending Physician: Celine Love MD  Primary Care Provider: Eduardo Ruiz MD        Subjective:     Principal Problem:Encephalopathy, metabolic        HPI:  Kashif Iverson is a 62 y.o. male, with a PMHx of PAD, diastolic CHF, COPD, non-small cell lung cancer who presents to the ED s/p fall on yesterday. Pt family reports pt slipped and fell in bathroom yesterday and afterwards was walking fine. EMS reports hypotensive bp 80/40 and heart rate 40-60 irregular. Pt given 500ml of NS per EMS. Pt placed on 2L of O2. On Today pt can't stand on his own, unable to ambulate. Pt reports leg and back pain. Leg pain is exacerbated with movement. Pt reports noticing flaccid left arm on yesterday. Patient denies lightheadedness, dizziness, or other associated symptoms. This is the extent of the patient's complaints today in the Emergency Department.CTA head,neck show,Right basal ganglia lacunar infarct.  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  Associated attenuation of the left proximal subclavian and left common carotid artery.  Pulmonary masses.   patient has severe  hypokalemia 2.4,being replaced po and IV,patient seems confused.transfer center trying sending patient to [facility] for multidisciplinary team.pending transfer,      Overview/Hospital Course:  Mr. Iverson was admitted to Hospital Medicine for management of a fall with leg/back pain as well as flaccid left arm.  His mentation worsened.  MRI brachial plexus showing large mass within the left supraclavicular region with extesive metastasis. MRI brain and C/T/L spine were negative for mets, but was a poor study 2/2  motion.  EEG was consistent with a moderate to severe diffuse encephalopathy, but no seizures.  Chart review shows patient not candidate for further treatment. Case discussed with [facility] here who are in agreement. He was found to have an occlusive thrombus in the left internal jugular and subclavian veins.  He was started on treatment dose Eliquis.  Palliative care consulted. Family requesting DNR and home vs NH with hospice.  Family has tentatively selected [facility] with a hospice consult.      Interval History: No acute events overnight.  Was told on Friday by CM that he would likely DC to [facility] with Hospice on Saturday.  On Saturday, DC was planned and the weekend SW mentioned that the wife was told that DC would be on Tuesday 8/29.  Just spoke with the wife who said she was always planning for DC on Tuesday, never on Saturday.  Informed her that he has been medically ready to DC since Thursday with accepting facilities.  Acknowledged that this process is very difficult and apologized for conflicting information.  She said she is touring more facilities today.    Review of Systems   Unable to perform ROS: Mental status change     Objective:     Vital Signs (Most Recent):  Temp: 98.2 °F (36.8 °C) (08/28/23 0811)  Pulse: 60 (08/28/23 0936)  Resp: 18 (08/28/23 0936)  BP: 114/65 (08/28/23 0811)  SpO2: 97 % (08/28/23 0811) Vital Signs (24h Range):  Temp:  [97.6 °F (36.4 °C)-98.7 °F (37.1 °C)] 98.2 °F (36.8 °C)  Pulse:  [] 60  Resp:  [18-21] 18  SpO2:  [91 %-97 %] 97 %  BP: (102-114)/(56-65) 114/65     Weight: 66.5 kg (146 lb 9.7 oz)  Body mass index is 21.04 kg/m².    Intake/Output Summary (Last 24 hours) at 8/28/2023 1036  Last data filed at 8/27/2023 1717  Gross per 24 hour   Intake 220 ml   Output --   Net 220 ml           Physical Exam  Constitutional:       Appearance: He is well-developed. He is ill-appearing.   HENT:      Head: Normocephalic and atraumatic.   Cardiovascular:      Rate  "and Rhythm: Normal rate and regular rhythm.      Heart sounds: No murmur heard.  Pulmonary:      Effort: Pulmonary effort is normal. No respiratory distress.      Breath sounds: Normal breath sounds. No wheezing or rales.   Abdominal:      General: There is no distension.      Palpations: Abdomen is soft.      Tenderness: There is no abdominal tenderness.   Musculoskeletal:         General: Swelling (LUE) present. No deformity.   Skin:     General: Skin is warm.   Neurological:      Mental Status: He is alert. He is disoriented.             Significant Labs: All pertinent labs within the past 24 hours have been reviewed.  CBC:   No results for input(s): "WBC", "HGB", "HCT", "PLT" in the last 48 hours.    CMP:   No results for input(s): "NA", "K", "CL", "CO2", "GLU", "BUN", "CREATININE", "CALCIUM", "PROT", "ALBUMIN", "BILITOT", "ALKPHOS", "AST", "ALT", "ANIONGAP", "EGFRNONAA" in the last 48 hours.    Invalid input(s): "ESTGFAFRICA"      Significant Imaging: I have reviewed all pertinent imaging results/findings within the past 24 hours.      Assessment/Plan:      * Encephalopathy, metabolic  · Metastatic SCC of lung with extensive mets s/p palliative chemoradiation  · CTA head with no acute abnormality. No large vessel occlusion. Right basal ganglia lacunar infarct.  Right frontal and right parietal encephalomalacia changes again seen.   · Neurology consulted  · Delirium precautions  · Ammonia, B12, TSH WNL. Folate deficient, will supplement  · MRI brachial plexus showing large mass with extensive invasion/mets  · MRI brain and C/T/L spine negative for mets but poor study given motion  · EEG negative for seizures but with moderate/severe encephalopathy    Squamous cell carcinoma of lung  · See above    Secondary malignant neoplasm of bone  · See above      Lung cancer, main bronchus, left  · Known SCC of the left lung with mets  · Per Onc, no further treatment options  · Palliative Care consulted      Acute deep vein " thrombosis (DVT) of left upper extremity  · Noted to have swelling and warmth to LUE on 8/25  · US showed an occlusive thrombus in the left internal jugular and subclavian veins.   · Has been on Eliquis 5mg for afib, but given new DVT will start treatment dose Eliquis 10mg BID x 7 days and then 5mg BID thereafter    Fall  · Imaging negative for fracture  · Pain control with Palliative      ACP (advance care planning)  Advance Care Planning     Date: 08/23/2023    Code Status  In light of the patients advanced and life limiting illness,I engaged the the family in a voluntary conversation about the patient's preferences for care at the very end of life. The patient wishes to continue with life prolonging measures at this time.  Along those lines, the patient does wish to have CPR or other invasive treatments performed when his heart and/or breathing stops. I communicated to the family that patient will remain FULL code at this time.  I spent a total of 15 minutes engaging the patient in this advance care planning discussion.       [facility]  I engaged the family in a voluntary conversation about advance care planning and we specifically addressed what the goals of care would be moving forward, in light of the patient's change in clinical status, specifically worsening metastatic lung cancer.  We did specifically address the patient's likely prognosis, which is poor.  We explored the patient's values and preferences for future care.  The family endorses that what is most important right now is to focus on curative/life-prolongation (regardless of treatment burdens)    Accordingly, we have decided that the best plan to meet the patient's goals includes continuing with treatment    I did not explain the role for hospice care at this stage of the patient's illness, including its ability to help the patient live with the best quality of life possible.  We will not be making a hospice referral.    I spent a total of 15 minutes  engaging the patient in this advance care planning discussion.    Performed by Dr. Parnell Hand             Encounter for palliative care  · Palliative following      Left arm weakness  · MRI brachial plexus showing large mass within the left supraclavicular region with associated mediastinal, left upper lobe and left paravertebral extension and associated invasion/destruction of the left clavicle, left 1st rib and possibly the adjacent C6 through T2 vertebral bodies.        Persistent atrial fibrillation  · Chronic issue but now with afib with RVR  · Increase Metoprolol dose    COPD (chronic obstructive pulmonary disease)  · Chronic and stable  · On 2L NC  · Duoneb PRN        Coronary artery disease involving native coronary artery of native heart without angina pectoris  · Chronic and stable  · Cont aspirin, statin      PAD (peripheral artery disease)  · Chronic and stable  · Continue Aspirin and Statin      Chronic diastolic heart failure  · No signs of decompensated HF  · Cont aspirin, metoprolol      Hypokalemia  · Resolved      Essential hypertension  · Chronic and stable  · Cont metoprolol      Bronchiectasis without complication  · Noted      Atherosclerosis of aorta  On medical treatments.        VTE Risk Mitigation (From admission, onward)         Ordered     apixaban tablet 10 mg  2 times daily         08/26/23 0630                Discharge Planning   AZ: 8/29/2023     Code Status: DNR   Is the patient medically ready for discharge?: Yes    Reason for patient still in hospital (select all that apply): Pending disposition  Discharge Plan A: New Nursing Home placement - skilled nursing care facility (with hospice consult)   Discharge Delays: None known at this time              Celine Love MD  Department of Hospital Medicine   [facility] - Intensive Care ([place])

## 2023-08-28 NOTE — SUBJECTIVE & OBJECTIVE
"Interval History: Awake, mumbling but calm. Asked if he was in pain and he shook his head no. Asked him if he was nauseous and he also said no. I asked him if there was anything I could do for him, and he said, "not right now." Still mildly confused, but cooperative at this time.    Past Medical History:   Diagnosis Date    Chronic obstructive pulmonary disease with acute exacerbation 9/5/2022    Combined systolic and diastolic heart failure     Dependence on supplemental oxygen 5/24/2022    History of completed stroke 9/3/2019     09/03/2019 On CT exam    History of non-ST elevation myocardial infarction (NSTEMI) 2/22/2022    Hypertension     Lung cancer     NSCLC    Lung mass     left lung    Macrocytic anemia 8/24/2019    PAD (peripheral artery disease) 3/14/2022    LUIS FELIPE 3/11/22    Peripheral artery disease        Past Surgical History:   Procedure Laterality Date    BRONCHOSCOPY N/A 08/30/2019    Procedure: Bronchoscopy;  Surgeon: Miguel Diagnostic Provider;  Location: [address];  Service: Anesthesiology;  Laterality: N/A;    CORONARY ANGIOGRAPHY N/A 03/04/2022    Procedure: ANGIOGRAM, CORONARY ARTERY;  Surgeon: Robson Green MD;  Location: [facility] CATH LAB;  Service: Cardiology;  Laterality: N/A;    INSERTION OF TUNNELED CENTRAL VENOUS CATHETER (CVC) WITH SUBCUTANEOUS PORT      MEDIPORT REMOVAL Right 3/21/2023    Procedure: REMOVAL, CATHETER, CENTRAL VENOUS, TUNNELED, WITH PORT;  Surgeon: Eddi Hernandez MD;  Location: [facility];  Service: General;  Laterality: Right;       Review of patient's allergies indicates:  No Known Allergies    Medications:  Continuous Infusions:      Scheduled Meds:   albuterol-ipratropium  3 mL Nebulization Q6H WAKE    apixaban  10 mg Oral BID    aspirin  81 mg Oral Daily    atorvastatin  80 mg Oral Daily    cilostazoL  100 mg Oral BID    folic acid  1 mg Oral Daily    metoprolol tartrate  50 mg Oral TID    morphine  15 mg Oral Q12H    OLANZapine zydis  5 mg Oral QHS    polyethylene " glycol  17 g Oral Daily    potassium chloride SA  20 mEq Oral Daily    senna-docusate 8.6-50 mg  1 tablet Oral BID    tiotropium bromide  2 puff Inhalation Daily     PRN Meds:acetaminophen, LORazepam, melatonin, naloxone, ondansetron, oxyCODONE, prochlorperazine, sodium chloride 0.9%    Family History       Problem Relation (Age of Onset)    Cancer Father, Sister    Diabetes Mother    Heart defect Mother    No Known Problems Son          Tobacco Use    Smoking status: Former     Current packs/day: 0.00     Average packs/day: 1 pack/day for 25.0 years (25.0 ttl pk-yrs)     Types: Cigarettes     Start date: 1995     Quit date: 2020     Years since quitting: 3.6    Smokeless tobacco: Never   Substance and Sexual Activity    Alcohol use: Yes     Comment: 11/3/22: Occ.    Drug use: Never    Sexual activity: Yes     Partners: Female       Review of Systems   Unable to perform ROS: Mental status change     Objective:     Vital Signs (Most Recent):  Temp: 98.2 °F (36.8 °C) (08/28/23 0811)  Pulse: 60 (08/28/23 0936)  Resp: 18 (08/28/23 0936)  BP: 114/65 (08/28/23 0811)  SpO2: 97 % (08/28/23 0811) Vital Signs (24h Range):  Temp:  [97.6 °F (36.4 °C)-98.7 °F (37.1 °C)] 98.2 °F (36.8 °C)  Pulse:  [] 60  Resp:  [18-21] 18  SpO2:  [91 %-97 %] 97 %  BP: (102-114)/(56-65) 114/65     Weight: 66.5 kg (146 lb 9.7 oz)  Body mass index is 21.04 kg/m².       Physical Exam  Constitutional:       Appearance: He is ill-appearing.   HENT:      Head: Normocephalic and atraumatic.      Right Ear: External ear normal.      Left Ear: External ear normal.      Nose: Nose normal.      Mouth/Throat:      Mouth: Mucous membranes are dry.   Eyes:      Conjunctiva/sclera: Conjunctivae normal.   Cardiovascular:      Rate and Rhythm: Normal rate.   Pulmonary:      Breath sounds: Wheezing present.   Abdominal:      General: Bowel sounds are normal.      Palpations: Abdomen is soft.   Musculoskeletal:         General: Tenderness present.  "  Lymphadenopathy:      Cervical: No cervical adenopathy.   Skin:     General: Skin is dry.   Neurological:      Mental Status: He is disoriented.            Review of Symptoms      Symptom Assessment (ESAS 0-10 Scale)  Pain:  0  Dyspnea:  0  Anxiety:  0  Nausea:  0  Depression:  0  Anorexia:  0  Fatigue:  0  Insomnia:  0  Restlessness:  0  Agitation:  0 due to Mental status change         Pain Assessment in Advanced Demential Scale (PAINAD)   Breathing - Independent of vocalization:  1  Negative vocalization:  0  Facial expression:  1  Body language:  1  Consolability:  1  Total:  4    Living Arrangements:  Lives with spouse    Psychosocial/Cultural:   See Palliative Psychosocial Note: No   to wife for 38 years  **Primary  to Follow**  Palliative Care  Consult: No    Spiritual:  F - Noa and Belief:  Muslim - raised [employer], but attended [employer] with wife once they got   A - Address in Care:  Engaged spiritual support        Advance Care Planning   Advance Directives:   Living Will: No    LaPOST: No    Do Not Resuscitate Status: Yes    Medical Power of : No      Decision Making:  Family answered questions and Patient unable to communicate due to disease severity/cognitive impairment  Goals of Care: The family endorses that what is most important right now is to focus on comfort and QOL     Accordingly, we have decided that the best plan to meet the patient's goals includes enrolling in hospice care           Significant Labs: CBC:   No results for input(s): "WBC", "HGB", "HCT", "PLT" in the last 48 hours.    CMP:   No results for input(s): "NA", "K", "CL", "CO2", "GLU", "BUN", "CREATININE", "CALCIUM", "PROT", "ALBUMIN", "BILITOT", "ALKPHOS", "AST", "ALT", "ANIONGAP", "EGFRNONAA" in the last 48 hours.    Invalid input(s): "ESTGFAFRICA"    CBC:   Recent Labs   Lab 08/24/23  0440   WBC 16.74*   HGB 13.5*   HCT 42.1   MCV 89          BMP:  No results " "for input(s): "GLU", "NA", "K", "CL", "CO2", "BUN", "CREATININE", "CALCIUM", "MG" in the last 24 hours.    LFT:  Lab Results   Component Value Date    AST 51 (H) 08/24/2023    ALKPHOS 120 08/24/2023    BILITOT 0.9 08/24/2023     Albumin:   Albumin   Date Value Ref Range Status   08/24/2023 2.3 (L) 3.5 - 5.2 g/dL Final     Protein:   Total Protein   Date Value Ref Range Status   08/24/2023 6.2 6.0 - 8.4 g/dL Final     Lactic acid:   Lab Results   Component Value Date    LACTATE 2.2 08/20/2023    LACTATE 3.7 (HH) 03/16/2023       Significant Imaging: I have reviewed all pertinent imaging results/findings within the past 24 hours.    "

## 2023-08-28 NOTE — ASSESSMENT & PLAN NOTE
Kashif Iverson is a 62-year-old man with a history of stage IV NSCLC with metastasis to the bone s/p palliative chemo/radiation, chronic hypoxic respiratory failure 2/2 COPD on supplemental oxygen, HFpEF, atrial fibrillation on apixaban, PAD who was admitted after presenting with an unwitnessed fall and acute LUE paralysis. He was transferred from [facility] to [facility] for evaluation of new LUE weakness in setting of metastatic disease/injury. Palliative and Supportive Care was consulted to explore goals of care.    Advance Care Planning   Goals of Care:  - Code status: DNAR/DNI  - Next of kin: wife Natty Iverson, [phone]  - ACP documents: none  - Patient does not have decision making capacity  - Prognosis: poor  - Family's understanding of prognosis: fair, will immensely benefit from continued counseling about expectations  - Plans: Spoke with Joan from [employer] on the phone. Plan is to meet with Mrs. Iverson and Ojan tomorrow late morning to discuss possibility of returning home with hospice support because paying out of pocket costs for nursing home is likely to financially ruin this very unfortunate family. Wife has immense fears of bringing her  home. Will explore these fears and help support Mrs. Iverson as best as possible    Goals of Care Conversation:  - 23: Met Mr. Iverson, who answers my questions, but struggles to identify if he's in pain.  reviewed, and prescribed oxycodone 15 mg dose. I called his wife Mrs. Iverson who is currently at home, having to pay bills and run errands. She shared that she consulted her sister-in-law, brother-in-law and her own siblings about what to do. She shared that her sister-in-law guilted her, saying that she should've put Mr. Iverson in hospice a long time ago and he could've  peacefully in his sleep. Her brother-in-law was more supportive, saying that if he needs to be in a nursing home, then she should have no guilt about this. She asked me if  "at the nursing home would they continue his magnesium, potassium, pain medications and I said that as long as a doctor orders it and Mr. Iverson is willing to take it, yes. I did clarify that his vitamins and electrolytes are not going to make him live longer because his cancer is what is taking over his body and ending his life. She asked me if the pain medicines are given just to put people to sleep. I said that is not the case. That we give pain medicine only because there is severe pain to treat. I shared that a lot of people see that people fall asleep or get drowsy after taking pain medicine because that is an expected side effect. But that we can either not give Mr. Iverson pain medicine and he scream in pain for the rest of his life, or we allow him to have medicines that decrease his pain, so that the rest of his life is spent in comfort. She agreed with the latter. At this time, she is still filled with guilt about bringing him to a nursing home vs bringing him home with hospice. Ideally if insurance will cover the cost of the nursing home stay, she'd choose this. I shared that PT recommended SNF, and there is a chance his insurance will cover this. If he does SNF and doesn't do well (which is what we will expect over time as his body gets weaker), then we can make the transition to hospice support at that time, as his insurance will only cover one service or the other, but not both at the same time. She will be here this afternoon to take a look at the SNF list, and  will see if his insurance will cover it. Will continue to follow along.  - 8/23/23: I met Mr. Iverson, who is encephalopathic and disoriented. His wife is at bedside. I spoke with her in a separate room and she shares her fears and worries that he is dying. Validated her sadness and fears. She received a call from the attending hospitalist, who shared the unfortunate results of the MRI. She cried and asked, "Is he dying?" He replied, " ""Yes, he is dying. Not today, and not in the next days." He explained that he spoke with Oncology and confirmed that there are no treatment options available. She asked if there was a role in radiation to shrink his tumor. He shared that while he can get in touch with Rad/Onc to ask, that he worried that even this will not fix his advanced cancer and not change the trajectory. I spoke briefly with Dr. Guevara and agreed to hang up the phone for now to allow Mrs. Iverson time to process the serious information. Allowed time for Mrs. Iverson to cry. She shared that they have been  for 38 years, dated for about 8 years before deciding to get  when they were 25 years old. He had always expressed that he wanted to live, and she said that maybe this is a different scenario. I agreed with her, that in the past, it made a lot of sense to comply with all the treatments and tests that he went through with the doctors in efforts to buy more time, and that now unfortunately we are in a different place. She asked what can we do. I shared that in knowing that time is short (likely short weeks) that we can do our best to make sure every living day he has on earth is spent out of pain and suffering. Discussed using hospice support during this time to ensure that he has the right medical team to help alleviate his suffering. For a long time we talked about home with hospice versus nursing home with hospice. She feels torn because she is not strong enough to change him in bed, but wonders if she's being cruel by not bringing him home. Normalized that many people do both - bring people home and engage their community to help or utilize nursing home, and that there are no wrong choices. Offered that we can at least explore the cost of nursing home (as this is a questions she had) and get more information for her and her family to make the final decision.  - I did share in my recommendations for comfort, that we protect Mr. Iverson " from interventions that would cause more pain/suffering than help. Discussed that interventions like CPR/intubation will not help. I recommended that in efforts to allow Mr. Iverson a natural and peaceful death, that we protect him from these interventions, which means a DNR code status. She agreed. She asked if I would share this with Dr. Guevara as well.  - Agreed to start utilizing medications for pain/anxiety so that he doesn't spend another day suffering. Will continue to follow along to support Mrs. Iverson and Mr. Iverson during this very heartbreaking time.       Neoplasm Related Pain  -  reviewed: was prescribed 30 days of oxycodone 15 mg  - Continue MS Contin 15 mg PO BID (newly started during this admission)  - Continue oxycodone IR 10 mg PO q4h PRN severe pain, dyspnea  - In setting of hyperactive and hypoactive delirium, will need to assess pain behaviors including guarding, grimace, vocalization. He has clear reasons for significant pain due to painful bony metastasis and left arm swelling. Appreciate thoughtful and observant bedside RN who is doing this as well    Dyspnea  - Continue supplemental oxygen for dyspnea relief  - Opioids as above for dyspnea relief    Anxiety/Agitation  - Continue home olanzapine ODT 5 mg PO qHS  - Added lorazepam 1 mg PO q4h PRN anxiety

## 2023-08-28 NOTE — PROGRESS NOTES
[facility] ([place] [id number])  Palliative Medicine  Progress Note    Patient Name: Kashif Iverson  MRN: [id number]  Admission Date: 8/20/2023  Hospital Length of Stay: 7 days  Code Status: DNR   Attending Provider: Celine Love MD  Consulting Provider: Kacie Huston MD  Primary Care Physician: Eduardo Ruiz MD  Principal Problem:Encephalopathy, metabolic    Patient information was obtained from patient and primary team.      Assessment/Plan:     Palliative Care  Encounter for palliative care  Kashif Iverson is a 62-year-old man with a history of stage IV NSCLC with metastasis to the bone s/p palliative chemo/radiation, chronic hypoxic respiratory failure 2/2 COPD on supplemental oxygen, HFpEF, atrial fibrillation on apixaban, PAD who was admitted after presenting with an unwitnessed fall and acute LUE paralysis. He was transferred from [facility] to [facility] for evaluation of new LUE weakness in setting of metastatic disease/injury. Palliative and Supportive Care was consulted to explore goals of care.    Advance Care Planning   Goals of Care:  - Code status: DNAR/DNI  - Next of kin: wife Natty Iverson, [phone]  - ACP documents: none  - Patient does not have decision making capacity  - Prognosis: poor  - Family's understanding of prognosis: fair, will immensely benefit from continued counseling about expectations  - Plans: Spoke with Joan from [employer] on the phone. Plan is to meet with Mrs. Iverson and Joan tomorrow late morning to discuss possibility of returning home with hospice support because paying out of pocket costs for nursing home is likely to financially ruin this very unfortunate family. Wife has immense fears of bringing her  home. Will explore these fears and help support Mrs. Iverson as best as possible    Goals of Care Conversation:  - 8/24/23: Met Mr. Iverson, who answers my questions, but struggles to identify if he's in pain.  reviewed, and prescribed oxycodone 15  mg dose. I called his wife Mrs. Iverson who is currently at home, having to pay bills and run errands. She shared that she consulted her sister-in-law, brother-in-law and her own siblings about what to do. She shared that her sister-in-law guilted her, saying that she should've put Mr. Iverson in hospice a long time ago and he could've  peacefully in his sleep. Her brother-in-law was more supportive, saying that if he needs to be in a nursing home, then she should have no guilt about this. She asked me if at the nursing home would they continue his magnesium, potassium, pain medications and I said that as long as a doctor orders it and Mr. Iverson is willing to take it, yes. I did clarify that his vitamins and electrolytes are not going to make him live longer because his cancer is what is taking over his body and ending his life. She asked me if the pain medicines are given just to put people to sleep. I said that is not the case. That we give pain medicine only because there is severe pain to treat. I shared that a lot of people see that people fall asleep or get drowsy after taking pain medicine because that is an expected side effect. But that we can either not give Mr. Iverson pain medicine and he scream in pain for the rest of his life, or we allow him to have medicines that decrease his pain, so that the rest of his life is spent in comfort. She agreed with the latter. At this time, she is still filled with guilt about bringing him to a nursing home vs bringing him home with hospice. Ideally if insurance will cover the cost of the nursing home stay, she'd choose this. I shared that PT recommended SNF, and there is a chance his insurance will cover this. If he does SNF and doesn't do well (which is what we will expect over time as his body gets weaker), then we can make the transition to hospice support at that time, as his insurance will only cover one service or the other, but not both at the same time. She will  "be here this afternoon to take a look at the SNF list, and  will see if his insurance will cover it. Will continue to follow along.  - 8/23/23: I met Mr. Iverson, who is encephalopathic and disoriented. His wife is at bedside. I spoke with her in a separate room and she shares her fears and worries that he is dying. Validated her sadness and fears. She received a call from the attending hospitalist, who shared the unfortunate results of the MRI. She cried and asked, "Is he dying?" He replied, "Yes, he is dying. Not today, and not in the next days." He explained that he spoke with Oncology and confirmed that there are no treatment options available. She asked if there was a role in radiation to shrink his tumor. He shared that while he can get in touch with Rad/Onc to ask, that he worried that even this will not fix his advanced cancer and not change the trajectory. I spoke briefly with Dr. Guevara and agreed to hang up the phone for now to allow Mrs. Iverson time to process the serious information. Allowed time for Mrs. Iverson to cry. She shared that they have been  for 38 years, dated for about 8 years before deciding to get  when they were 25 years old. He had always expressed that he wanted to live, and she said that maybe this is a different scenario. I agreed with her, that in the past, it made a lot of sense to comply with all the treatments and tests that he went through with the doctors in efforts to buy more time, and that now unfortunately we are in a different place. She asked what can we do. I shared that in knowing that time is short (likely short weeks) that we can do our best to make sure every living day he has on earth is spent out of pain and suffering. Discussed using hospice support during this time to ensure that he has the right medical team to help alleviate his suffering. For a long time we talked about home with hospice versus nursing home with hospice. She feels torn " because she is not strong enough to change him in bed, but wonders if she's being cruel by not bringing him home. Normalized that many people do both - bring people home and engage their community to help or utilize nursing home, and that there are no wrong choices. Offered that we can at least explore the cost of nursing home (as this is a questions she had) and get more information for her and her family to make the final decision.  - I did share in my recommendations for comfort, that we protect Mr. Iverson from interventions that would cause more pain/suffering than help. Discussed that interventions like CPR/intubation will not help. I recommended that in efforts to allow Mr. Iverson a natural and peaceful death, that we protect him from these interventions, which means a DNR code status. She agreed. She asked if I would share this with Dr. Guevara as well.  - Agreed to start utilizing medications for pain/anxiety so that he doesn't spend another day suffering. Will continue to follow along to support Mrs. Iverson and Mr. Iverson during this very heartbreaking time.      Neoplasm Related Pain  -  reviewed: was prescribed 30 days of oxycodone 15 mg  - Continue MS Contin 15 mg PO BID (newly started during this admission)  - Continue oxycodone IR 10 mg PO q4h PRN severe pain, dyspnea  - In setting of hyperactive and hypoactive delirium, will need to assess pain behaviors including guarding, grimace, vocalization. He has clear reasons for significant pain due to painful bony metastasis and left arm swelling. Appreciate thoughtful and observant bedside RN who is doing this as well    Dyspnea  - Continue supplemental oxygen for dyspnea relief  - Opioids as above for dyspnea relief    Anxiety/Agitation  - Continue home olanzapine ODT 5 mg PO qHS  - Added lorazepam 1 mg PO q4h PRN anxiety        I will follow-up with patient. Please contact us if you have any additional questions.    Subjective:     Chief Complaint:   Chief  "Complaint   Patient presents with    Fall     Pt arrived via ems, pt chief complaint is a fall. Pt had a fall yesterday and has not been eating or drinking for past few days per ems. Pt was initially hypotensive 80/40 and heart rate ranging 40-60 irregular.         HPI:   Kashif Iverson is a 62-year-old man with a history of stage IV NSCLC with metastasis to the bone s/p palliative chemo/radiation, chronic hypoxic respiratory failure 2/2 COPD on supplemental oxygen, HFpEF, atrial fibrillation on apixaban, PAD who was admitted after presenting with an unwitnessed fall and acute LUE paralysis. He was transferred from [facility] to [facility] for evaluation of new LUE weakness in setting of metastatic disease/injury. Palliative and Supportive Care was consulted to explore goals of care.      Hospital Course:  No notes on file    Interval History: Awake, mumbling but calm. Asked if he was in pain and he shook his head no. Asked him if he was nauseous and he also said no. I asked him if there was anything I could do for him, and he said, "not right now." Still mildly confused, but cooperative at this time.    Past Medical History:   Diagnosis Date    Chronic obstructive pulmonary disease with acute exacerbation 9/5/2022    Combined systolic and diastolic heart failure     Dependence on supplemental oxygen 5/24/2022    History of completed stroke 9/3/2019     09/03/2019 On CT exam    History of non-ST elevation myocardial infarction (NSTEMI) 2/22/2022    Hypertension     Lung cancer     NSCLC    Lung mass     left lung    Macrocytic anemia 8/24/2019    PAD (peripheral artery disease) 3/14/2022    LUIS FELIPE 3/11/22    Peripheral artery disease        Past Surgical History:   Procedure Laterality Date    BRONCHOSCOPY N/A 08/30/2019    Procedure: Bronchoscopy;  Surgeon: Miguel Diagnostic Provider;  Location: [facility] OR [address] [place] [address];  Service: Anesthesiology;  Laterality: N/A;    CORONARY ANGIOGRAPHY N/A 03/04/2022    " Procedure: ANGIOGRAM, CORONARY ARTERY;  Surgeon: Robson Green MD;  Location: [facility] CATH LAB;  Service: Cardiology;  Laterality: N/A;    INSERTION OF TUNNELED CENTRAL VENOUS CATHETER (CVC) WITH SUBCUTANEOUS PORT      MEDIPORT REMOVAL Right 3/21/2023    Procedure: REMOVAL, CATHETER, CENTRAL VENOUS, TUNNELED, WITH PORT;  Surgeon: Eddi Hernandez MD;  Location: [facility] OR;  Service: General;  Laterality: Right;       Review of patient's allergies indicates:  No Known Allergies    Medications:  Continuous Infusions:      Scheduled Meds:   albuterol-ipratropium  3 mL Nebulization Q6H WAKE    apixaban  10 mg Oral BID    aspirin  81 mg Oral Daily    atorvastatin  80 mg Oral Daily    cilostazoL  100 mg Oral BID    folic acid  1 mg Oral Daily    metoprolol tartrate  50 mg Oral TID    morphine  15 mg Oral Q12H    OLANZapine zydis  5 mg Oral QHS    polyethylene glycol  17 g Oral Daily    potassium chloride SA  20 mEq Oral Daily    senna-docusate 8.6-50 mg  1 tablet Oral BID    tiotropium bromide  2 puff Inhalation Daily     PRN Meds:acetaminophen, LORazepam, melatonin, naloxone, ondansetron, oxyCODONE, prochlorperazine, sodium chloride 0.9%    Family History       Problem Relation (Age of Onset)    Cancer Father, Sister    Diabetes Mother    Heart defect Mother    No Known Problems Son          Tobacco Use    Smoking status: Former     Current packs/day: 0.00     Average packs/day: 1 pack/day for 25.0 years (25.0 ttl pk-yrs)     Types: Cigarettes     Start date: 1995     Quit date: 2020     Years since quitting: 3.6    Smokeless tobacco: Never   Substance and Sexual Activity    Alcohol use: Yes     Comment: 11/3/22: Occ.    Drug use: Never    Sexual activity: Yes     Partners: Female       Review of Systems   Unable to perform ROS: Mental status change     Objective:     Vital Signs (Most Recent):  Temp: 98.2 °F (36.8 °C) (08/28/23 0811)  Pulse: 60 (08/28/23 0936)  Resp: 18 (08/28/23 0936)  BP: 114/65  (08/28/23 0811)  SpO2: 97 % (08/28/23 0811) Vital Signs (24h Range):  Temp:  [97.6 °F (36.4 °C)-98.7 °F (37.1 °C)] 98.2 °F (36.8 °C)  Pulse:  [] 60  Resp:  [18-21] 18  SpO2:  [91 %-97 %] 97 %  BP: (102-114)/(56-65) 114/65     Weight: 66.5 kg (146 lb 9.7 oz)  Body mass index is 21.04 kg/m².       Physical Exam  Constitutional:       Appearance: He is ill-appearing.   HENT:      Head: Normocephalic and atraumatic.      Right Ear: External ear normal.      Left Ear: External ear normal.      Nose: Nose normal.      Mouth/Throat:      Mouth: Mucous membranes are dry.   Eyes:      Conjunctiva/sclera: Conjunctivae normal.   Cardiovascular:      Rate and Rhythm: Normal rate.   Pulmonary:      Breath sounds: Wheezing present.   Abdominal:      General: Bowel sounds are normal.      Palpations: Abdomen is soft.   Musculoskeletal:         General: Tenderness present.   Lymphadenopathy:      Cervical: No cervical adenopathy.   Skin:     General: Skin is dry.   Neurological:      Mental Status: He is disoriented.            Review of Symptoms      Symptom Assessment (ESAS 0-10 Scale)  Pain:  0  Dyspnea:  0  Anxiety:  0  Nausea:  0  Depression:  0  Anorexia:  0  Fatigue:  0  Insomnia:  0  Restlessness:  0  Agitation:  0 due to Mental status change         Pain Assessment in Advanced Demential Scale (PAINAD)   Breathing - Independent of vocalization:  1  Negative vocalization:  0  Facial expression:  1  Body language:  1  Consolability:  1  Total:  4    Living Arrangements:  Lives with spouse    Psychosocial/Cultural:   See Palliative Psychosocial Note: No   to wife for 38 years  **Primary  to Follow**  Palliative Care  Consult: No    Spiritual:  F - Noa and Belief:  Holiness - raised Protestant, but attended [employer] with wife once they got   A - Address in Care:  Engaged spiritual support        Advance Care Planning  Advance Directives:   Living Will: No    LaPOST: No    Do  "Not Resuscitate Status: Yes    Medical Power of : No      Decision Making:  Family answered questions and Patient unable to communicate due to disease severity/cognitive impairment  Goals of Care: The family endorses that what is most important right now is to focus on comfort and QOL     Accordingly, we have decided that the best plan to meet the patient's goals includes enrolling in hospice care           Significant Labs: CBC:   No results for input(s): "WBC", "HGB", "HCT", "PLT" in the last 48 hours.    CMP:   No results for input(s): "NA", "K", "CL", "CO2", "GLU", "BUN", "CREATININE", "CALCIUM", "PROT", "ALBUMIN", "BILITOT", "ALKPHOS", "AST", "ALT", "ANIONGAP", "EGFRNONAA" in the last 48 hours.    Invalid input(s): "ESTGFAFRICA"    CBC:   Recent Labs   Lab 08/24/23  0440   WBC 16.74*   HGB 13.5*   HCT 42.1   MCV 89          BMP:  No results for input(s): "GLU", "NA", "K", "CL", "CO2", "BUN", "CREATININE", "CALCIUM", "MG" in the last 24 hours.    LFT:  Lab Results   Component Value Date    AST 51 (H) 08/24/2023    ALKPHOS 120 08/24/2023    BILITOT 0.9 08/24/2023     Albumin:   Albumin   Date Value Ref Range Status   08/24/2023 2.3 (L) 3.5 - 5.2 g/dL Final     Protein:   Total Protein   Date Value Ref Range Status   08/24/2023 6.2 6.0 - 8.4 g/dL Final     Lactic acid:   Lab Results   Component Value Date    LACTATE 2.2 08/20/2023    LACTATE 3.7 (HH) 03/16/2023       Significant Imaging: I have reviewed all pertinent imaging results/findings within the past 24 hours.    I spent a total of 50 minutes on the day of the visit. This includes face to face time in discussion of goals of care, symptom assessment, coordination of care and emotional support. This also includes non-face to face time preparing to see the patient (eg, review of tests/imaging), obtaining and/or reviewing separately obtained history, documenting clinical information in the electronic or other health record, independently " interpreting results and communicating results to the patient/family/caregiver, or care coordinator.         Kacie Huston MD  Palliative Medicine  [facility] - Intensive Care ([place]

## 2023-08-28 NOTE — CHAPLAIN
"Palliative Care     Patient: Kashif Iverson  MRN: [id number]  : 1960  Age: 62 y.o.  Hospital Length of Stay: 7  Code Status: Code Status Discussion Note  Attending Provider: Celine Love MD  Principal Problem: Encephalopathy, metabolic    Non-clinical observations of patient/room: Visited [facility] pt again, he was alone and awake; wife not present; pt seemed comfortable.     Pt was engaging and "talked" a lot to me, but I could catch what he was saying. I did understand a few words. He's not in pain and he's not sure if/when his wife, Natty, will be at the hospital.  Pt also agreed for me to turn off the bright overhead fluorescent light and he thanked me.     Provided compassionate presence and reminded him he is loved and being well taken care of.     Future (Spiritual Care Plan of Action):  After reading the case notes that wife is in process of seeking NH and hospice agencies, palliative  will be available to her for emotional support. Met with wife a couple times last week. Lord, in your mercy.        **Spiritual Care Dept. Chaplains are available evenings, overnight and weekends **    In Peace,  Rev. Nicole Herrera MDiv, [facility]  Board Certified   Palliative Medicine Department  [phone]   "

## 2023-08-28 NOTE — PT/OT/SLP PROGRESS
"Speech Language Pathology Treatment    Patient Name:  Kashif Iverson   MRN:  [id number]  Admitting Diagnosis: Encephalopathy, metabolic    Recommendations:                 General Recommendations:   ongoing swallowing assessment/monitoring PO tolerance  Diet recommendations:   , Liquid Diet Level: Full liquids, Thin   Aspiration Precautions: 1 bite/sip at a time, Alternating bites/sips, Assistance with meals, Check for pocketing/oral residue, Eliminate distractions, Feed only when awake/alert, Frequent oral care, HOB to 90 degrees, Meds crushed in puree, Monitor for s/s of aspiration, Small bites/sips, and Strict aspiration precautions   General Precautions: Standard, aspiration, fall  Communication strategies:  go to room if call light pushed    Assessment:     Kashif Iverson is a 62 y.o. male with an SLP diagnosis of Dysphagia and increased risk of aspiration associated with altered mental status.     Subjective     "Thank you." Pt stated appropriately at end of session.  Pt was awake/alert, but displaying confusion and confused language with decreased intelligibility t/o session.     Pain/Comfort:  Pain Rating 1: 0/10    Respiratory Status: Nasal cannula, flow 3 L/min    Objective:     Has the patient been evaluated by SLP for swallowing?   Yes  Keep patient NPO? No   Current Respiratory Status:        Pt found sitting up in bed awake/alert and performing rotary jaw movements.  Upon examination of oral cavity, pt was found to be excessively chewing on a residual small piece of a peach that came from peach yogurt.  SLP provided pt with additional bites of the peach yogurt, but avoiding any more chunks of peach.  Straw sips of Boost were also given.  Liquid and puree washes did not clear peach residue and had to be manually removed from oral cavity by SLP.  SLP proceeded to fee pt bites of mixed berry yogurt, while again avoiding pieces of diced fruit.  SLP gave pt a bite including a piece of a berry to assess ability to " clear oral cavity. Following puree and liquid wash, pt was unable to clear oral cavity as evidenced by continued excessive chewing.  Manual assistance required again to clear residuals pieces of fruit.  SLP recommend pt remain on full liquid diet and thin liquids.  Nurse notified that caution needs to be taken when feeding pt yogurt if chunks of fruit are present - pt should not be fed bites with chunks in it.      Goals:   Multidisciplinary Problems       SLP Goals          Problem: SLP    Goal Priority Disciplines Outcome   SLP Goal    Low SLP Ongoing, Progressing   Description: Speech Language Pathology Goals  Goals expected to be met by 8/29:  1. Pt will tolerate least restrictive diet without s/s of aspiration.   2. Pt will participate speech/language/cognitive evaluation when appropriate.                                Plan:     Patient to be seen:  3 x/week   Plan of Care expires:  09/21/23  Plan of Care reviewed with:  patient   SLP Follow-Up:  Yes       Discharge recommendations:   (according to notes, plan is for pt to d/c to NH with hospice)     Time Tracking:     SLP Treatment Date:   08/28/23  Speech Start Time:  0951  Speech Stop Time:  1000     Speech Total Time (min):  9 min    Billable Minutes: Treatment Swallowing Dysfunction 9    08/28/2023

## 2023-08-29 PROBLEM — Z51.5 COMFORT MEASURES ONLY STATUS: Status: ACTIVE | Noted: 2023-01-01

## 2023-08-29 NOTE — PROGRESS NOTES
[facility] LCSW attempted to meet with pt's wife at bedside today, however, no family present. Pt appeared restless and anxious. Floor nurse as well as [facility] Med MD Dr. Huston notified of pt's agitation. LCSW will attempt to check in with family at a later point.     Jyoti Hoff, [facility]  Palliative Medicine

## 2023-08-29 NOTE — PROGRESS NOTES
[facility] - Intensive Care ([place] [address])  [facility] Medicine  Progress Note    Patient Name: Kashif Iverson  MRN: [id number]  Patient Class: IP- Inpatient   Admission Date: 8/20/2023  Length of Stay: 8 days  Attending Physician: Celine Love MD  Primary Care Provider: Eduardo Ruiz MD        Subjective:     Principal Problem:Encephalopathy, metabolic        HPI:  Kashif Iverson is a 62 y.o. male, with a PMHx of PAD, diastolic CHF, COPD, non-small cell lung cancer who presents to the ED s/p fall on yesterday. Pt family reports pt slipped and fell in bathroom yesterday and afterwards was walking fine. EMS reports hypotensive bp 80/40 and heart rate 40-60 irregular. Pt given 500ml of NS per EMS. Pt placed on 2L of O2. On Today pt can't stand on his own, unable to ambulate. Pt reports leg and back pain. Leg pain is exacerbated with movement. Pt reports noticing flaccid left arm on yesterday. Patient denies lightheadedness, dizziness, or other associated symptoms. This is the extent of the patient's complaints today in the Emergency Department.CTA head,neck show,Right basal ganglia lacunar infarct.  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  Associated attenuation of the left proximal subclavian and left common carotid artery.  Pulmonary masses.   patient has severe  hypokalemia 2.4,being replaced po and IV,patient seems confused.transfer center trying sending patient to [facility] for multidisciplinary team.pending transfer,      Overview/Hospital Course:  Mr. Iverson was admitted to Hospital Medicine for management of a fall with leg/back pain as well as flaccid left arm.  His mentation worsened.  MRI brachial plexus showing large mass within the left supraclavicular region with extesive metastasis. MRI brain and C/T/L spine were negative for mets, but was a poor study 2/2  motion.  EEG was consistent with a moderate to severe diffuse encephalopathy, but no seizures.  Chart review shows patient not candidate for further treatment. Case discussed with [facility] here who are in agreement. He was found to have an occlusive thrombus in the left internal jugular and subclavian veins.  He was started on treatment dose Eliquis.  Palliative care consulted. Family requesting DNR and home vs NH with hospice.  Family has tentatively selected [facility] with a hospice consult.      Interval History: No acute events overnight.  Plan for meeting with IP Hospice, Palliative Care and wife to discuss IP hospice.    Review of Systems   Unable to perform ROS: Mental status change     Objective:     Vital Signs (Most Recent):  Temp: 97.5 °F (36.4 °C) (08/29/23 0911)  Pulse: 104 (08/29/23 0911)  Resp: 16 (08/29/23 1000)  BP: 93/64 (08/29/23 0911)  SpO2: (!) 89 % (08/29/23 0911) Vital Signs (24h Range):  Temp:  [97.5 °F (36.4 °C)-98.7 °F (37.1 °C)] 97.5 °F (36.4 °C)  Pulse:  [] 104  Resp:  [14-20] 16  SpO2:  [89 %-97 %] 89 %  BP: ()/(62-70) 93/64     Weight: 66.5 kg (146 lb 9.7 oz)  Body mass index is 21.04 kg/m².    Intake/Output Summary (Last 24 hours) at 8/29/2023 1115  Last data filed at 8/29/2023 1016  Gross per 24 hour   Intake 440 ml   Output 200 ml   Net 240 ml           Physical Exam  Constitutional:       Appearance: He is well-developed. He is ill-appearing.   HENT:      Head: Normocephalic and atraumatic.   Cardiovascular:      Rate and Rhythm: Normal rate and regular rhythm.      Heart sounds: No murmur heard.  Pulmonary:      Effort: Pulmonary effort is normal. No respiratory distress.      Breath sounds: Normal breath sounds. No wheezing or rales.   Abdominal:      General: There is no distension.      Palpations: Abdomen is soft.      Tenderness: There is no abdominal tenderness.   Musculoskeletal:         General: Swelling (LUE) present. No deformity.   Skin:     General:  "Skin is warm.   Neurological:      Mental Status: He is alert. He is disoriented.             Significant Labs: All pertinent labs within the past 24 hours have been reviewed.  CBC:   Recent Labs   Lab 08/29/23  0243   WBC 13.42*   HGB 10.6*   HCT 32.5*          CMP:   No results for input(s): "NA", "K", "CL", "CO2", "GLU", "BUN", "CREATININE", "CALCIUM", "PROT", "ALBUMIN", "BILITOT", "ALKPHOS", "AST", "ALT", "ANIONGAP", "EGFRNONAA" in the last 48 hours.    Invalid input(s): "ESTGF[place]"      Significant Imaging: I have reviewed all pertinent imaging results/findings within the past 24 hours.      Assessment/Plan:      * Encephalopathy, metabolic  · Metastatic SCC of lung with extensive mets s/p palliative chemoradiation  · CTA head with no acute abnormality. No large vessel occlusion. Right basal ganglia lacunar infarct.  Right frontal and right parietal encephalomalacia changes again seen.   · Neurology consulted  · Delirium precautions  · Ammonia, B12, TSH WNL. Folate deficient, will supplement  · MRI brachial plexus showing large mass with extensive invasion/mets  · MRI brain and C/T/L spine negative for mets but poor study given motion  · EEG negative for seizures but with moderate/severe encephalopathy    Squamous cell carcinoma of lung  · See above    Secondary malignant neoplasm of bone  · See above      Lung cancer, main bronchus, left  · Known SCC of the left lung with mets  · Per Onc, no further treatment options  · Palliative Care consulted      Acute deep vein thrombosis (DVT) of left upper extremity  · Noted to have swelling and warmth to LUE on 8/25  · US showed an occlusive thrombus in the left internal jugular and subclavian veins.   · Has been on Eliquis 5mg for afib, but given new DVT will start treatment dose Eliquis 10mg BID x 7 days and then 5mg BID thereafter    Fall  · Imaging negative for fracture  · Pain control with Palliative      ACP (advance care planning)  Advance Care Planning "     Date: 08/23/2023    Code Status  In light of the patients advanced and life limiting illness,I engaged the the family in a voluntary conversation about the patient's preferences for care at the very end of life. The patient wishes to continue with life prolonging measures at this time.  Along those lines, the patient does wish to have CPR or other invasive treatments performed when his heart and/or breathing stops. I communicated to the family that patient will remain FULL code at this time.  I spent a total of 15 minutes engaging the patient in this advance care planning discussion.       [facility]  I engaged the family in a voluntary conversation about advance care planning and we specifically addressed what the goals of care would be moving forward, in light of the patient's change in clinical status, specifically worsening metastatic lung cancer.  We did specifically address the patient's likely prognosis, which is poor.  We explored the patient's values and preferences for future care.  The family endorses that what is most important right now is to focus on curative/life-prolongation (regardless of treatment burdens)    Accordingly, we have decided that the best plan to meet the patient's goals includes continuing with treatment    I did not explain the role for hospice care at this stage of the patient's illness, including its ability to help the patient live with the best quality of life possible.  We will not be making a hospice referral.    I spent a total of 15 minutes engaging the patient in this advance care planning discussion.    Performed by Dr. Parnell [facility] for palliative care  · Palliative following      Left arm weakness  · MRI brachial plexus showing large mass within the left supraclavicular region with associated mediastinal, left upper lobe and left paravertebral extension and associated invasion/destruction of the left clavicle, left 1st rib and possibly the adjacent C6  through T2 vertebral bodies.        Persistent atrial fibrillation  · Chronic issue but now with afib with RVR  · Increase Metoprolol dose    COPD (chronic obstructive pulmonary disease)  · Chronic and stable  · On 2L NC  · Duoneb PRN        Coronary artery disease involving native coronary artery of native heart without angina pectoris  · Chronic and stable  · Cont aspirin, statin      PAD (peripheral artery disease)  · Chronic and stable  · Continue Aspirin and Statin      Chronic diastolic heart failure  · No signs of decompensated HF  · Cont aspirin, metoprolol      Hypokalemia  · Resolved      Essential hypertension  · Chronic and stable  · Cont metoprolol      Bronchiectasis without complication  · Noted      Atherosclerosis of aorta  On medical treatments.        VTE Risk Mitigation (From admission, onward)         Ordered     apixaban tablet 5 mg  2 times daily        See Hyperspace for full Linked Orders Report.    08/28/23 1123     apixaban tablet 10 mg  2 times daily        See Hyperspace for full Linked Orders Report.    08/28/23 1123                Discharge Planning   AZ: 8/29/2023     Code Status: DNR   Is the patient medically ready for discharge?: Yes    Reason for patient still in hospital (select all that apply): Pending disposition  Discharge Plan A: New Nursing Home placement - [facility] care facility (with hospice consult)   Discharge Delays: None known at this time              Celine Love MD  Department of Hospital Medicine   [facility] - Intensive Care ([place])

## 2023-08-29 NOTE — SUBJECTIVE & OBJECTIVE
"Interval History: No acute events overnight.  Plan for meeting with IP Hospice, Palliative Care and wife to discuss IP hospice.    Review of Systems   Unable to perform ROS: Mental status change     Objective:     Vital Signs (Most Recent):  Temp: 97.5 °F (36.4 °C) (08/29/23 0911)  Pulse: 104 (08/29/23 0911)  Resp: 16 (08/29/23 1000)  BP: 93/64 (08/29/23 0911)  SpO2: (!) 89 % (08/29/23 0911) Vital Signs (24h Range):  Temp:  [97.5 °F (36.4 °C)-98.7 °F (37.1 °C)] 97.5 °F (36.4 °C)  Pulse:  [] 104  Resp:  [14-20] 16  SpO2:  [89 %-97 %] 89 %  BP: ()/(62-70) 93/64     Weight: 66.5 kg (146 lb 9.7 oz)  Body mass index is 21.04 kg/m².    Intake/Output Summary (Last 24 hours) at 8/29/2023 1115  Last data filed at 8/29/2023 1016  Gross per 24 hour   Intake 440 ml   Output 200 ml   Net 240 ml           Physical Exam  Constitutional:       Appearance: He is well-developed. He is ill-appearing.   HENT:      Head: Normocephalic and atraumatic.   Cardiovascular:      Rate and Rhythm: Normal rate and regular rhythm.      Heart sounds: No murmur heard.  Pulmonary:      Effort: Pulmonary effort is normal. No respiratory distress.      Breath sounds: Normal breath sounds. No wheezing or rales.   Abdominal:      General: There is no distension.      Palpations: Abdomen is soft.      Tenderness: There is no abdominal tenderness.   Musculoskeletal:         General: Swelling (LUE) present. No deformity.   Skin:     General: Skin is warm.   Neurological:      Mental Status: He is alert. He is disoriented.             Significant Labs: All pertinent labs within the past 24 hours have been reviewed.  CBC:   Recent Labs   Lab 08/29/23  0243   WBC 13.42*   HGB 10.6*   HCT 32.5*          CMP:   No results for input(s): "NA", "K", "CL", "CO2", "GLU", "BUN", "CREATININE", "CALCIUM", "PROT", "ALBUMIN", "BILITOT", "ALKPHOS", "AST", "ALT", "ANIONGAP", "EGFRNONAA" in the last 48 hours.    Invalid input(s): " ""ESTGF[place]"      Significant Imaging: I have reviewed all pertinent imaging results/findings within the past 24 hours.  "

## 2023-08-29 NOTE — SUBJECTIVE & OBJECTIVE
Interval History: Awake, guarding left arm. Had long supportive conversation with wife and [facility] patient navigator at his bedside.    Past Medical History:   Diagnosis Date    Chronic obstructive pulmonary disease with acute exacerbation 9/5/2022    Combined systolic and diastolic heart failure     Dependence on supplemental oxygen 5/24/2022    History of completed stroke 9/3/2019     09/03/2019 On CT exam    History of non-ST elevation myocardial infarction (NSTEMI) 2/22/2022    Hypertension     Lung cancer     NSCLC    Lung mass     left lung    Macrocytic anemia 8/24/2019    PAD (peripheral artery disease) 3/14/2022    LUIS FELIPE 3/11/22    Peripheral artery disease        Past Surgical History:   Procedure Laterality Date    BRONCHOSCOPY N/A 08/30/2019    Procedure: Bronchoscopy;  Surgeon: [facility] Diagnostic Provider;  Location: [facility] OR [address] [place] [address];  Service: Anesthesiology;  Laterality: N/A;    CORONARY ANGIOGRAPHY N/A 03/04/2022    Procedure: ANGIOGRAM, CORONARY ARTERY;  Surgeon: Robson Green MD;  Location: [facility] CATH LAB;  Service: Cardiology;  Laterality: N/A;    INSERTION OF TUNNELED CENTRAL VENOUS CATHETER (CVC) WITH SUBCUTANEOUS PORT      MEDIPORT REMOVAL Right 3/21/2023    Procedure: REMOVAL, CATHETER, CENTRAL VENOUS, TUNNELED, WITH PORT;  Surgeon: Eddi Hernandez MD;  Location: [facility] OR;  Service: General;  Laterality: Right;       Review of patient's allergies indicates:  No Known Allergies    Medications:  Continuous Infusions:      Scheduled Meds:   albuterol-ipratropium  3 mL Nebulization Q6H WAKE    apixaban  10 mg Oral BID    Followed by    [START ON 9/2/2023] apixaban  5 mg Oral BID    aspirin  81 mg Oral Daily    atorvastatin  80 mg Oral Daily    cilostazoL  100 mg Oral BID    folic acid  1 mg Oral Daily    metoprolol tartrate  50 mg Oral TID    morphine  15 mg Oral Q12H    OLANZapine zydis  5 mg Oral QHS    polyethylene glycol  17 g Oral Daily    potassium chloride SA  20 mEq Oral Daily     senna-docusate 8.6-50 mg  1 tablet Oral BID    tiotropium bromide  2 puff Inhalation Daily     PRN Meds:acetaminophen, LORazepam, melatonin, naloxone, ondansetron, oxyCODONE, prochlorperazine, sodium chloride 0.9%    Family History       Problem Relation (Age of Onset)    Cancer Father, Sister    Diabetes Mother    Heart defect Mother    No Known Problems Son          Tobacco Use    Smoking status: Former     Current packs/day: 0.00     Average packs/day: 1 pack/day for 25.0 years (25.0 ttl pk-yrs)     Types: Cigarettes     Start date: 1995     Quit date: 2020     Years since quitting: 3.6    Smokeless tobacco: Never   Substance and Sexual Activity    Alcohol use: Yes     Comment: 11/3/22: Occ.    Drug use: Never    Sexual activity: Yes     Partners: Female       Review of Systems   Unable to perform ROS: Mental status change     Objective:     Vital Signs (Most Recent):  Temp: 97.5 °F (36.4 °C) (08/29/23 0911)  Pulse: 104 (08/29/23 0911)  Resp: 18 (08/29/23 1143)  BP: 93/64 (08/29/23 0911)  SpO2: (!) 89 % (08/29/23 0911) Vital Signs (24h Range):  Temp:  [97.5 °F (36.4 °C)-98.7 °F (37.1 °C)] 97.5 °F (36.4 °C)  Pulse:  [] 104  Resp:  [14-20] 18  SpO2:  [89 %-97 %] 89 %  BP: ()/(62-70) 93/64     Weight: 66.5 kg (146 lb 9.7 oz)  Body mass index is 21.04 kg/m².       Physical Exam  Constitutional:       Appearance: He is ill-appearing.   HENT:      Head: Normocephalic and atraumatic.      Right Ear: External ear normal.      Left Ear: External ear normal.      Nose: Nose normal.      Mouth/Throat:      Mouth: Mucous membranes are dry.   Eyes:      Conjunctiva/sclera: Conjunctivae normal.   Cardiovascular:      Rate and Rhythm: Normal rate.   Pulmonary:      Breath sounds: Wheezing present.   Abdominal:      General: Bowel sounds are normal.      Palpations: Abdomen is soft.   Musculoskeletal:         General: Tenderness present.   Lymphadenopathy:      Cervical: No cervical adenopathy.   Skin:     General:  "Skin is dry.   Neurological:      Mental Status: He is disoriented.            Review of Symptoms      Symptom Assessment (ESAS 0-10 Scale)  Pain:  0  Dyspnea:  0  Anxiety:  0  Nausea:  0  Depression:  0  Anorexia:  0  Fatigue:  0  Insomnia:  0  Restlessness:  0  Agitation:  0 due to Mental status change         Pain Assessment in Advanced Demential Scale (PAINAD)   Breathing - Independent of vocalization:  1  Negative vocalization:  0  Facial expression:  1  Body language:  1  Consolability:  1  Total:  4    Living Arrangements:  Lives with spouse    Psychosocial/Cultural:   See Palliative Psychosocial Note: No   to wife for 38 years  **Primary  to Follow**  Palliative Care  Consult: No    Spiritual:  F - Noa and Belief:  Scientologist - raised Scientologist, but attended [employer] with wife once they got   A - Address in Care:  Engaged spiritual support        Advance Care Planning   Advance Directives:   Living Will: No    LaPOST: No    Do Not Resuscitate Status: Yes    Medical Power of : No      Decision Making:  Family answered questions and Patient unable to communicate due to disease severity/cognitive impairment  Goals of Care: What is most important right now is to focus on curative/life-prolongation (regardless of treatment burdens). Accordingly, we have decided that the best plan to meet the patient's goals includes continuing with treatment.         Significant Labs: CBC:   Recent Labs   Lab 08/29/23  0243   WBC 13.42*   HGB 10.6*   HCT 32.5*          CMP:   No results for input(s): "NA", "K", "CL", "CO2", "GLU", "BUN", "CREATININE", "CALCIUM", "PROT", "ALBUMIN", "BILITOT", "ALKPHOS", "AST", "ALT", "ANIONGAP", "EGFRNONAA" in the last 48 hours.    Invalid input(s): "ESTGF[place]"    CBC:   Recent Labs   Lab 08/29/23  0243   WBC 13.42*   HGB 10.6*   HCT 32.5*   MCV 88          BMP:  No results for input(s): "GLU", "NA", "K", "CL", "CO2", "BUN", " ""CREATININE", "CALCIUM", "MG" in the last 24 hours.    LFT:  Lab Results   Component Value Date    AST 51 (H) 08/24/2023    ALKPHOS 120 08/24/2023    BILITOT 0.9 08/24/2023     Albumin:   Albumin   Date Value Ref Range Status   08/24/2023 2.3 (L) 3.5 - 5.2 g/dL Final     Protein:   Total Protein   Date Value Ref Range Status   08/24/2023 6.2 6.0 - 8.4 g/dL Final     Lactic acid:   Lab Results   Component Value Date    LACTATE 2.2 08/20/2023    LACTATE 3.7 (HH) 03/16/2023       Significant Imaging: I have reviewed all pertinent imaging results/findings within the past 24 hours.    "
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
## 2023-08-29 NOTE — ASSESSMENT & PLAN NOTE
Advance Care Planning     Date: 08/23/2023    Code Status  In light of the patients advanced and life limiting illness,I engaged the the family in a voluntary conversation about the patient's preferences for care at the very end of life. The patient wishes to continue with life prolonging measures at this time.  Along those lines, the patient does wish to have CPR or other invasive treatments performed when his heart and/or breathing stops. I communicated to the family that patient will remain FULL code at this time.  I spent a total of 15 minutes engaging the patient in this advance care planning discussion.       [facility]  I engaged the family in a voluntary conversation about advance care planning and we specifically addressed what the goals of care would be moving forward, in light of the patient's change in clinical status, specifically worsening metastatic lung cancer.  We did specifically address the patient's likely prognosis, which is poor.  We explored the patient's values and preferences for future care.  The family endorses that what is most important right now is to focus on curative/life-prolongation (regardless of treatment burdens)    Accordingly, we have decided that the best plan to meet the patient's goals includes continuing with treatment    I did not explain the role for hospice care at this stage of the patient's illness, including its ability to help the patient live with the best quality of life possible.  We will not be making a hospice referral.    I spent a total of 15 minutes engaging the patient in this advance care planning discussion.    Performed by Dr. Parmjit Guevara
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
## 2023-08-29 NOTE — CARE UPDATE
Informed by nursing of worsening tachycardia, agitation and dropping oxygen sats.  Discussed with Dr. Huston of Palliative.  Will start a Morphine gtt, with plans to transition to comfort care and admit to the [facility]patient [facility] tomorrow morning.  Wife Ms. Luz called and updated and agreeable to the plan.  She is very appreciative.  Orders updated.    Celine Love MD, [facility]

## 2023-08-29 NOTE — PLAN OF CARE
Problem: Fall Injury Risk  Goal: Absence of Fall and Fall-Related Injury  Outcome: Ongoing, Progressing - Patient remained stable and safe this shift.  He continues to be on eliquis for left arm thrombus.  Left arm still swollen as well.  He had 1 large incontinent episode of urine and was cleaned up and new gown applied.  Patient awaiting SNF placement.

## 2023-08-30 NOTE — SIGNIFICANT EVENT
Called by RN regarding PO medications. Review of chart show he is transitioning to comfort and hospice tomorrow. Morphine gtt started today by primary. I removed oral medications and ordered the comfort order set for nursing specific orders related to comfort care.

## 2023-08-30 NOTE — PLAN OF CARE
[facility] - [facility] ([facility]  Discharge Final Note    Primary Care Provider: Eduardo Ruiz MD    Expected Discharge Date: 2023    Final Discharge Note (most recent)       Final Note - 23 09          Final Note    Assessment Type Final Discharge Note     Anticipated Discharge Disposition      What phone number can be called within the next 1-3 days to see how you are doing after discharge? [phone]        Post-Acute Status    Discharge Delays None known at this time                     Important Message from Medicare  Important Message from Medicare regarding Discharge Appeal Rights: Other (comments) (pt. is not stable to signed)     Date IMM was signed: 23  Time IMM was signed: 959    Patient  23 0508 am      Rosmery Cordoba RN  Case Management  [facility]  [phone]

## 2023-08-30 NOTE — NURSING
Morphine gtt started at 4mg/hr per orders for comfort. Pt agitated/uncomfortable, hypertensive and tachycardic, dropping oxygen sats. Provider Benny العلي notified to verify plan of care and change orders to comfort/hospice care. Per notes plan for inpatient hospice tomorrow.

## 2023-08-30 NOTE — DISCHARGE SUMMARY
[facility] - [facility] ([place] [address])  [facility] Medicine  Discharge Summary      Patient Name: Kashif Iverson  MRN: [id number]  [facility]: [id number]  Patient Class: IP- Inpatient  Admission Date: 8/20/2023  Hospital Length of Stay: 9 days  Discharge Date and Time: 8/30/2023     Attending Physician: Celine Love MD   Discharging Provider: Celine Love MD  Primary Care Provider: Eduardo Ruiz MD  [facility] Medicine Team: [facility] Celine Love MD  Primary Care Team: [facility] MED B    HPI:   Kashif Iverson is a 62 y.o. male, with a PMHx of PAD, diastolic CHF, COPD, non-small cell lung cancer who presents to the ED s/p fall on yesterday. Pt family reports pt slipped and fell in bathroom yesterday and afterwards was walking fine. EMS reports hypotensive bp 80/40 and heart rate 40-60 irregular. Pt given 500ml of NS per EMS. Pt placed on 2L of O2. On Today pt can't stand on his own, unable to ambulate. Pt reports leg and back pain. Leg pain is exacerbated with movement. Pt reports noticing flaccid left arm on yesterday. Patient denies lightheadedness, dizziness, or other associated symptoms. This is the extent of the patient's complaints today in the Emergency Department.CTA head,neck show,Right basal ganglia lacunar infarct.  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  Associated attenuation of the left proximal subclavian and left common carotid artery.  Pulmonary masses.   patient has severe  hypokalemia 2.4,being replaced po and IV,patient seems confused.transfer center trying sending patient to [facility] for multidisciplinary team.pending transfer,      * No surgery found *      Hospital Course:   Mr. Iverson was admitted to Hospital Medicine for management of a fall with leg/back pain as well as flaccid left arm.  His mentation worsened.  MRI brachial plexus showing  large mass within the left supraclavicular region with extesive metastasis. MRI brain and C/T/L spine were negative for mets, but was a poor study 2/2 motion.  EEG was consistent with a moderate to severe diffuse encephalopathy, but no seizures.  Chart review shows patient not candidate for further treatment. Case discussed with [facility] here who are in agreement. He was found to have an occlusive thrombus in the left internal jugular and subclavian veins.  He was started on treatment dose Eliquis.  Palliative care consulted. Family requesting DNR and home vs NH with hospice.  Family struggled with NH with hospice acceptance, so the process was delayed.  On the evening of 8/29, he was noted to be tachycardic, agitated, and more hypoxic.  He was started on a Morphine gtt with plans to transition to comfort care and the [facility]in[facility] on 8/30 after speaking with Palliative Care and the wife.  However, he passed at 5am on 8/30.       Goals of Care Treatment Preferences:  Code Status: DNR          What is most important right now is to focus on curative/life-prolongation (regardless of treatment burdens).  Accordingly, we have decided that the best plan to meet the patient's goals includes continuing with treatment.      Consults:   Consults (From admission, onward)        Status Ordering Provider     Inpatient consult to Spiritual Care  Once        Provider:  (Not yet assigned)    Completed ISAIAH STARK     Inpatient consult to Palliative Care  Once        Provider:  (Not yet assigned)    Completed ISAIAH STARK     Inpatient consult to Neurology  Once        Provider:  (Not yet assigned)    Completed KATE FITZPATRICK     Inpatient consult to Palliative Care  Once        Provider:  (Not yet assigned)    Completed SHALA GOTTI     Inpatient consult to Spiritual Care  Once        Provider:  (Not yet assigned)    Completed SHALA GOTTI          No new Assessment & Plan notes have been filed  under this hospital service since the last note was generated.  Service: Hospital Medicine    Final Active Diagnoses:    Diagnosis Date Noted POA    PRINCIPAL PROBLEM:  Encephalopathy, metabolic [G93.41] 2023 Yes    Squamous cell carcinoma of lung [C34.90] 08/15/2022 Yes    Secondary malignant neoplasm of bone [C79.51] 2019 Yes    Lung cancer, main bronchus, left [C34.02] 09/10/2019 Yes     Chronic    Comfort measures only status [Z51.5] 2023 Not Applicable    Acute deep vein thrombosis (DVT) of left upper extremity [I82.622] 2023 No    Encounter for palliative care [Z51.5] 2023 Not Applicable    ACP (advance care planning) [Z71.89] 2023 Not Applicable    Fall [W19.XXXA] 2023 Yes    Left arm weakness [R29.898] 2023 Yes    Persistent atrial fibrillation [I48.19] 2022 Yes    COPD (chronic obstructive pulmonary disease) [J44.9] 2022 Yes    Coronary artery disease involving native coronary artery of native heart without angina pectoris [I25.10] 2022 Yes     Chronic    PAD (peripheral artery disease) [I73.9] 2022 Yes    Chronic diastolic heart failure [I50.32] 2022 Yes    Hypokalemia [E87.6] 2022 Yes    Essential hypertension [I10]  Yes     Chronic    Bronchiectasis without complication [J47.9] 11/10/2021 Yes      Problems Resolved During this Admission:       Discharged Condition:     Disposition:       Time spent on the discharge of patient: 35 minutes         Celine Love MD  Department of Hospital Medicine  [facility] - Intensive Care ([place])

## 2023-08-30 NOTE — PLAN OF CARE
23 0901   Final Note   Assessment Type Final Discharge Note   Anticipated Discharge Disposition    What phone number can be called within the next 1-3 days to see how you are doing after discharge? [phone]   Post-Acute Status   Discharge Delays None known at this time     Rosmery Cordoba RN  Case Management  [facility]  [phone]

## 2023-08-30 NOTE — CARE UPDATE
RAPID RESPONSE NURSE ROUND       Rounding completed with charge RNMariela for tachycardia and HTN reports patient is DNR with comfort care orders. No additional concerns verbalized at this time. Instructed to call [phone] for further concerns or assistance.

## 2023-08-30 NOTE — PLAN OF CARE
Comfort measures only. Morphine gtt titrated per orders to achieve pt comfort, currently at 8mg/hr. Prn medications given for secretions and anxiety/agitation. Pt responds to pain, nonverbal, no eye opening, restless/ moans w/movement.      Problem: Adult Inpatient Plan of Care  Goal: Plan of Care Review  Outcome: Ongoing, Progressing  Goal: Patient-Specific Goal (Individualized)  Outcome: Ongoing, Progressing  Goal: Optimal Comfort and Wellbeing  Outcome: Ongoing, Progressing  Goal: Readiness for Transition of Care  Outcome: Ongoing, Progressing     Problem: Fall Injury Risk  Goal: Absence of Fall and Fall-Related Injury  Outcome: Ongoing, Progressing     Problem: Palliative Care  Goal: Enhanced Quality of Life  Outcome: Ongoing, Progressing

## 2023-08-30 NOTE — PLAN OF CARE
Informed of patient passing away. Patient seen and examined. After 2 minute auscultation, no spontaneous heartbeat or respiration noted. No withdrawal to noxious stimulation. Pupils 7 and fixed bilaterally.  Patient pronounced  at 508am on 2023.    Family notified by nursing team     notified by nursing team    Discharge and certification of death to be completed by attending physician: Celine Love MD

## 2023-08-30 NOTE — NURSING
Upon assessment no pulse present, no visible chest rise, no heart sounds auscultated. On-call provider Niko Gutierrez notified and came to assess pt at bedside. Time of death 0508. Wife and son called and voicemail left to call back.

## 2023-09-05 NOTE — PHYSICIAN QUERY
PT Name: Kashif Iverson  MR #: [id number]    DOCUMENTATION CLARIFICATION     CDS/: Rolando Bartholomew, RN, BSN   Contact information: [email or web address]     This form is a permanent document in the medical record.     Query Date: September 5, 2023    By submitting this query, we are merely seeking further clarification of documentation. Please utilize your independent clinical judgment when addressing the question(s) below.    The Medical Record contains the following:   Indicators   Supporting Clinical Findings Location in Medical Record   X AMS, Confusion,  LOC, etc.  patient seems confused  Neurological:  Positive for weakness.   Psychiatric/Behavioral:  Positive for confusion. Negative for agitation and behavioral problems.       Neurological:      Mental Status: He is alert.      Comments: Seems confused.               Patient disoriented. Doesn't respond to questions appropriately                Wife notes encephalopathy and urinary incontinence are new as well.                Patient continues to be altered, only oriented to person   Hospital Medicine H&P 8/21/2023                          Hospital Medicine PN 8/22/2023                Neurology CN 8/22/2023                Hospital Medicine PN 8/23/2023     X Acute/Chronic Illness Encephalopathy, metabolic  Will monitor,CTA head with   No acute abnormality. No large vessel occlusion. Right basal ganglia lacunar infarct.  Right frontal and right parietal encephalomalacia changes again seen.   Bronchiectasis without complication  Will monitor while is on IV Abx.                    Final Active Diagnoses:     Diagnosis Date Noted POA    PRINCIPAL PROBLEM:  Encephalopathy, metabolic [G93.41] 08/21/2023 Yes    Squamous cell carcinoma of lung [C34.90] 08/15/2022 Yes    Secondary malignant neoplasm of bone [C79.51] 09/24/2019 Yes    Lung cancer, main bronchus, left [C34.02] 09/10/2019 Yes       Chronic    Comfort measures only status [Z51.5] 08/29/2023 Not Applicable     Hypokalemia [E87.6] 02/22/2022 Yes       Chronic    Bronchiectasis without complication [J47.9] 11/10/2021 Yes          Hospital Medicine H&P 8/21/2023                            Hospital Medicine Discharge Summary 8/30/2023     X Radiology Findings MRI brachial plexus showing large mass within the left supraclavicular region with associated mediastinal, left upper lobe and left paravertebral extension and associated invasion/destruction of the left clavicle, left 1st rib and possibly the adjacent C6 through T2 vertebral bodies.    CTA head with no acute abnormality. No large vessel occlusion. Right basal ganglia lacunar infarct.  Right frontal and right parietal encephalomalacia changes again seen.                     MRI brain and C/T/L spine were negative for mets, but was a poor study 2/2 motion.        CTA head,neck show,Right basal ganglia lacunar infarct.  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  Associated attenuation of the left proximal subclavian and left common carotid artery.  Pulmonary masses.      Hospital Medicine PN 8/23/2023                                  Hospital Medicine Discharge Summary 8/30/2023     X Electrolyte Imbalance presentation consistent with significant hypokalemia and hypomagnesemia, will place and may be contributing some to his weakness     Emergency Physician Note 8/20/2023 (filed 8/21/2023)      Medication     X Treatment         PT,OT,ST ,neuro consult                 --EEG to r/o subclinical seizure activity given encephalopathy and episode of incontinence                Palliative care consulted   Hospital Medicine H&P 8/21/2023              Neurology CN 8/22/2023                  Hospital Medicine Discharge Summary 8/30/2023     X Other ED workup notable for CBC white blood cell count 17.73, hemoglobin 13.5, CMP with  potassium 2.4, creatinine 0.8, troponin 0.039, BNP 1 6 9, ammonia 24, INR 1.1, urinalysis unremarkable, chest x-ray shows similar abnormal lung aeration, multiple bilateral pulmonary nodules, additional findings as noted above.  X-ray pelvis is unremarkable, CTA head and neck shows left apical neoplastic lesion with associated osseous destruction of left 1st medial clavicle and 1st anterior rib.              Ammonia WNL. Check B12, TSH, folate  Neurology consulted  Delirium precautions                    EEG was consistent with a moderate to severe diffuse encephalopathy, but no seizures.                 Family requesting DNR and home vs NH with hospice.  Family struggled with NH with hospice acceptance, so the process was delayed.  On the evening of 8/29, he was noted to be tachycardic, agitated, and more hypoxic.  He was started on a Morphine gtt with plans to transition to comfort care and the [facility]/inpatient hospice on 8/30 after speaking with Palliative Care and the wife.  However, he passed at 5am on 8/30.     Emergency Physician Note 8/20/2023 (filed 8/21/2023)                            Hospital Medicine PN 8/22/2023                      Hospital Medicine Discharge Summary 8/30/2023                Hospital Medicine Discharge Summary 8/30/2023     The noted clinical guidelines are only system guidelines and do not replace the providers clinical judgment.    The National [place] of Neurologic Disorders and Stroke (NINDS) of the NIH describes encephalopathy as any diffuse disease of the brain that alters brain function or structure.        Due to the conflicting clinical picture, please clinically validate the diagnosis of _Metabolic Encephalopathy____.      If validated, please provide additional clinical support for the diagnosis.       [  x ] Above stated diagnosis is not confirmed and/or it has been ruled out     [   ] Above stated diagnosis is confirmed.     Please specify clinical support (signs,  symptoms, and treatment) for   the confirmed diagnosis: ____________________     [   ] Above stated diagnosis is not confirmed and/or it has been ruled out,     other diagnosis ruled in (please specify):_______________     [   ] Other clarification (please specify): ___________________       Present on admission (POA) status:  [   ]  Yes (Y)   [   ]  No (N)   [   ]  Documentation insufficient to determine if condition is POA (U)   [  ]  Clinically Undetermined (W)     Please document in your progress notes daily for the duration of treatment until resolved, and include in your discharge summary.    References:  VARINDER Kruse RN, CCDS. (2018, June 9). Notes from the Instructor: Encephalopathy tips. Retrieved October 22, 2020, from [email or web address]    ICD-9-CM Coding Clinic First Quarter 2013, Effective with discharges: October 21, 2013 [facility] [employer] § Seizure with encephalopathy due to postictal state (2013).    ICD-10-CM/PCS [employer] Integrated Codebook (Version V.20.8.10.0) [Computer software]. (2020). Retrieved October 21, 2020.    National [place] of Neurological Disorders and Stroke. (2019, March 27). Retrieved October 22, 2020, from https://www.ninds.nih.gov/Disorders/All-Disorders/Btxxyxckjhhacg-Gtcessdkhrm-Fqqj    Form No. 75723

## 2023-09-06 ENCOUNTER — PATIENT OUTREACH (OUTPATIENT)
Dept: ADMINISTRATIVE | Facility: OTHER | Age: 63
End: 2023-09-06
Payer: MEDICARE

## 2024-03-13 NOTE — ASSESSMENT & PLAN NOTE
Problem: Occupational Therapy  Goal: Occupational Therapy Goal  Description: Goals to be met by: 03/22/24     Patient will increase functional independence with ADLs by performing:    Feeding with Set-up Assistance.  UE Dressing with Supervision.  LE Dressing with Minimal Assistance.  Grooming while seated with Set-up Assistance.  Toileting from toilet with Stand-by Assistance for hygiene and clothing management.   Bathing from  edge of bed with Contact Guard Assistance.  Toilet transfer to toilet with Supervision.    Outcome: Established OT POC      -[id number] Score: 3   -Continue home metoprolol and Eliquis  -Telemetry monitoring   ---

## 2024-04-29 NOTE — ASSESSMENT & PLAN NOTE
Called mother n/a l/m   This patient does have evidence of infective focus  My overall impression is sepsis. Vital signs were reviewed and noted in progress note.  Antibiotics given-   Antibiotics (From admission, onward)            Start     Stop Route Frequency Ordered    05/17/22 1830  piperacillin-tazobactam 4.5 g in dextrose 5 % 100 mL IVPB (ready to mix system)         -- IV Every 8 hours (non-standard times) 05/17/22 1728        Cultures were taken-   Microbiology Results (last 7 days)     Procedure Component Value Units Date/Time    Blood Culture #2 **CANNOT BE ORDERED STAT** [[id number]] Collected: 05/17/22 1723    Order Status: Completed Specimen: Blood from Peripheral, Forearm, Right Updated: 05/19/22 1903     Blood Culture, Routine No Growth to date      No Growth to date      No Growth to date    Blood Culture #1 **CANNOT BE ORDERED STAT** [[id number]] Collected: 05/17/22 1716    Order Status: Completed Specimen: Blood from Peripheral, Upper Arm, Right Updated: 05/19/22 1903     Blood Culture, Routine No Growth to date      No Growth to date      No Growth to date    Culture, Respiratory with Gram Stain [[id number]] Collected: 05/17/22 2030    Order Status: Completed Specimen: Sputum, Expectorated Updated: 05/19/22 0854     Respiratory Culture Normal respiratory maksim     Gram Stain (Respiratory) Few Gram positive cocci in clusters     Gram Stain (Respiratory) <10 epithelial cells per low power field.     Gram Stain (Respiratory) No WBC's        Latest lactate reviewed, they are-   Latest Reference Range & Units 02/22/22 14:54 02/22/22 19:10 05/17/22 17:16   Lactate, Humberto 0.5 - 2.2 mmol/L 8.7 (HH) [1] 2.6 (H) [2] 4.0 (HH) [3]      Organ dysfunction indicated by Acute respiratory failure  Source- Lungs    Source control Achieved by- IV antibiotics

## 2024-09-02 NOTE — PLAN OF CARE
Pt extremely confused last night verbalizes no c/o pain pt was pulling at telemetry, pulse ox and O2 but easily redirected.   No

## 2025-03-16 NOTE — ASSESSMENT & PLAN NOTE
He seems somewhat confused. Cannot remember the status of his lung cancer. Oriented to self and location but can't remember why he is in the hospital. He does have history of strokes. Other care providers state he has been confused like this on past admissions. This may be due to sepsis  - this is improved  - will check CTH out of caution- read pending     Discharged

## 2025-04-17 NOTE — ASSESSMENT & PLAN NOTE
-Patient is full code   Anatomic Location From Referring Provider: face Detail Level: Detailed X Size Of Lesion In Cm (Optional): 0

## (undated) DEVICE — OMNIPAQUE 350 200ML

## (undated) DEVICE — BLADE SURG STAINLESS STEEL #11

## (undated) DEVICE — GLOVE SURG BIOGEL LATEX SZ 7.5

## (undated) DEVICE — SEE MEDLINE ITEM 146292

## (undated) DEVICE — GUIDEWIRE SAFE T J TIP 145X2.5

## (undated) DEVICE — SEE MEDLINE ITEM 107746

## (undated) DEVICE — PAD DEFIB CADENCE ADULT R2

## (undated) DEVICE — PACK CATH LAB

## (undated) DEVICE — CATH DXTERITY PIGSTR 110CM 6FR

## (undated) DEVICE — CATH INFINITI 4F 3DRC 100CM

## (undated) DEVICE — APPLICATOR CHLORAPREP ORN 26ML

## (undated) DEVICE — KIT SYR REUSABLE

## (undated) DEVICE — SET DECANTER MEDICHOICE

## (undated) DEVICE — SUT VICRYL 3-0 27 SH

## (undated) DEVICE — COVER OVERHEAD SURG LT BLUE

## (undated) DEVICE — SHEET THYROID W/ISO-BAC

## (undated) DEVICE — ADHESIVE DERMABOND ADVANCED

## (undated) DEVICE — ELECTRODE REM PLYHSV RETURN 9

## (undated) DEVICE — CATH DXTERITY NOTO 100CM 6FR

## (undated) DEVICE — Device

## (undated) DEVICE — SUT MONOCRYL 4.0 PS2 CP496G

## (undated) DEVICE — KIT MANIFOLD LOW PRESS TUBING

## (undated) DEVICE — INTRODUCER CATH 6F 11CM

## (undated) DEVICE — KIT HAND CONTROL HIGH PRESSUR

## (undated) DEVICE — SOL NACL 0.9% INJ 250ML BG

## (undated) DEVICE — NDL HYPO REG 25G X 1 1/2

## (undated) DEVICE — TOWEL OR DISP STRL BLUE 4/PK

## (undated) DEVICE — BLANKET LOWER BODY 55.9X40.2IN

## (undated) DEVICE — ADHESIVE DERMABOND MINI HV

## (undated) DEVICE — SYR 10CC LUER LOCK

## (undated) DEVICE — DRAPE C-ARM FOR MOBILE XRAY

## (undated) DEVICE — CATH DXTERITY JL40 100CM 6FR

## (undated) DEVICE — SUPPORT ULNA NERVE PROTECTOR

## (undated) DEVICE — SYR 3CC LUER LOC